# Patient Record
Sex: FEMALE | Race: WHITE | NOT HISPANIC OR LATINO | Employment: OTHER | URBAN - METROPOLITAN AREA
[De-identification: names, ages, dates, MRNs, and addresses within clinical notes are randomized per-mention and may not be internally consistent; named-entity substitution may affect disease eponyms.]

---

## 2017-01-03 ENCOUNTER — GENERIC CONVERSION - ENCOUNTER (OUTPATIENT)
Dept: OTHER | Facility: OTHER | Age: 52
End: 2017-01-03

## 2017-01-09 ENCOUNTER — TRANSCRIBE ORDERS (OUTPATIENT)
Dept: ADMINISTRATIVE | Age: 52
End: 2017-01-09

## 2017-01-09 ENCOUNTER — APPOINTMENT (OUTPATIENT)
Dept: LAB | Age: 52
End: 2017-01-09
Payer: MEDICARE

## 2017-01-09 DIAGNOSIS — R80.9 PROTEINURIA: ICD-10-CM

## 2017-01-09 DIAGNOSIS — Z94.0 HISTORY OF KIDNEY TRANSPLANT: ICD-10-CM

## 2017-01-09 DIAGNOSIS — N18.4 CHRONIC KIDNEY DISEASE, STAGE IV (SEVERE) (HCC): ICD-10-CM

## 2017-01-09 DIAGNOSIS — N25.81 SECONDARY HYPERPARATHYROIDISM OF RENAL ORIGIN (HCC): ICD-10-CM

## 2017-01-09 DIAGNOSIS — I12.9 HYPERTENSIVE CHRONIC KIDNEY DISEASE WITH STAGE 1 THROUGH STAGE 4 CHRONIC KIDNEY DISEASE, OR UNSPECIFIED CHRONIC KIDNEY DISEASE: ICD-10-CM

## 2017-01-09 LAB
ALBUMIN SERPL BCP-MCNC: 3 G/DL (ref 3.5–5)
ALP SERPL-CCNC: 106 U/L (ref 46–116)
ALT SERPL W P-5'-P-CCNC: 25 U/L (ref 12–78)
ANION GAP SERPL CALCULATED.3IONS-SCNC: 10 MMOL/L (ref 4–13)
AST SERPL W P-5'-P-CCNC: 13 U/L (ref 5–45)
BILIRUB SERPL-MCNC: 0.31 MG/DL (ref 0.2–1)
BUN SERPL-MCNC: 49 MG/DL (ref 5–25)
CALCIUM SERPL-MCNC: 9.5 MG/DL (ref 8.3–10.1)
CHLORIDE SERPL-SCNC: 108 MMOL/L (ref 100–108)
CO2 SERPL-SCNC: 20 MMOL/L (ref 21–32)
CREAT SERPL-MCNC: 1.93 MG/DL (ref 0.6–1.3)
ERYTHROCYTE [DISTWIDTH] IN BLOOD BY AUTOMATED COUNT: 15.5 % (ref 11.6–15.1)
GFR SERPL CREATININE-BSD FRML MDRD: 27.3 ML/MIN/1.73SQ M
GLUCOSE SERPL-MCNC: 163 MG/DL (ref 65–140)
HCT VFR BLD AUTO: 39.4 % (ref 34.8–46.1)
HGB BLD-MCNC: 12.9 G/DL (ref 11.5–15.4)
MAGNESIUM SERPL-MCNC: 2.2 MG/DL (ref 1.6–2.6)
MCH RBC QN AUTO: 29.9 PG (ref 26.8–34.3)
MCHC RBC AUTO-ENTMCNC: 32.7 G/DL (ref 31.4–37.4)
MCV RBC AUTO: 91 FL (ref 82–98)
PHOSPHATE SERPL-MCNC: 3.7 MG/DL (ref 2.7–4.5)
PLATELET # BLD AUTO: 204 THOUSANDS/UL (ref 149–390)
PMV BLD AUTO: 13.9 FL (ref 8.9–12.7)
POTASSIUM SERPL-SCNC: 3.6 MMOL/L (ref 3.5–5.3)
PROT SERPL-MCNC: 8.9 G/DL (ref 6.4–8.2)
RBC # BLD AUTO: 4.32 MILLION/UL (ref 3.81–5.12)
SODIUM SERPL-SCNC: 138 MMOL/L (ref 136–145)
WBC # BLD AUTO: 11.36 THOUSAND/UL (ref 4.31–10.16)

## 2017-01-09 PROCEDURE — 36415 COLL VENOUS BLD VENIPUNCTURE: CPT

## 2017-01-09 PROCEDURE — 80053 COMPREHEN METABOLIC PANEL: CPT

## 2017-01-09 PROCEDURE — 83735 ASSAY OF MAGNESIUM: CPT

## 2017-01-09 PROCEDURE — 85027 COMPLETE CBC AUTOMATED: CPT

## 2017-01-09 PROCEDURE — 84100 ASSAY OF PHOSPHORUS: CPT

## 2017-01-09 PROCEDURE — 80197 ASSAY OF TACROLIMUS: CPT

## 2017-01-11 LAB — TACROLIMUS BLD LC/MS/MS-MCNC: 5.9 NG/ML (ref 2–20)

## 2017-01-13 ENCOUNTER — GENERIC CONVERSION - ENCOUNTER (OUTPATIENT)
Dept: OTHER | Facility: OTHER | Age: 52
End: 2017-01-13

## 2017-01-13 ENCOUNTER — TRANSCRIBE ORDERS (OUTPATIENT)
Dept: LAB | Facility: HOSPITAL | Age: 52
End: 2017-01-13

## 2017-01-13 ENCOUNTER — ALLSCRIPTS OFFICE VISIT (OUTPATIENT)
Dept: OTHER | Facility: OTHER | Age: 52
End: 2017-01-13

## 2017-01-13 ENCOUNTER — APPOINTMENT (OUTPATIENT)
Dept: LAB | Facility: HOSPITAL | Age: 52
End: 2017-01-13
Payer: MEDICARE

## 2017-01-13 DIAGNOSIS — E03.9 HYPOTHYROIDISM: ICD-10-CM

## 2017-01-13 DIAGNOSIS — R53.83 OTHER FATIGUE: ICD-10-CM

## 2017-01-13 DIAGNOSIS — D64.9 ANEMIA: ICD-10-CM

## 2017-01-13 LAB
FERRITIN SERPL-MCNC: 35 NG/ML (ref 8–388)
IRON SERPL-MCNC: 45 UG/DL (ref 50–170)
TSH SERPL DL<=0.05 MIU/L-ACNC: 2.41 UIU/ML (ref 0.36–3.74)

## 2017-01-13 PROCEDURE — 82728 ASSAY OF FERRITIN: CPT

## 2017-01-13 PROCEDURE — 36415 COLL VENOUS BLD VENIPUNCTURE: CPT

## 2017-01-13 PROCEDURE — 84443 ASSAY THYROID STIM HORMONE: CPT

## 2017-01-13 PROCEDURE — 83540 ASSAY OF IRON: CPT

## 2017-02-08 ENCOUNTER — APPOINTMENT (OUTPATIENT)
Dept: LAB | Age: 52
DRG: 683 | End: 2017-02-08
Payer: MEDICARE

## 2017-02-08 ENCOUNTER — APPOINTMENT (EMERGENCY)
Dept: RADIOLOGY | Facility: HOSPITAL | Age: 52
DRG: 683 | End: 2017-02-08
Payer: MEDICARE

## 2017-02-08 ENCOUNTER — HOSPITAL ENCOUNTER (INPATIENT)
Facility: HOSPITAL | Age: 52
LOS: 3 days | Discharge: HOME | DRG: 683 | End: 2017-02-11
Attending: EMERGENCY MEDICINE | Admitting: INTERNAL MEDICINE
Payer: MEDICARE

## 2017-02-08 ENCOUNTER — APPOINTMENT (INPATIENT)
Dept: RADIOLOGY | Facility: HOSPITAL | Age: 52
DRG: 683 | End: 2017-02-08
Payer: MEDICARE

## 2017-02-08 ENCOUNTER — TRANSCRIBE ORDERS (OUTPATIENT)
Dept: ADMINISTRATIVE | Age: 52
End: 2017-02-08

## 2017-02-08 ENCOUNTER — LAB CONVERSION - ENCOUNTER (OUTPATIENT)
Dept: OTHER | Facility: OTHER | Age: 52
End: 2017-02-08

## 2017-02-08 DIAGNOSIS — Z94.0 H/O KIDNEY TRANSPLANT: ICD-10-CM

## 2017-02-08 DIAGNOSIS — I12.9 PARENCHYMAL RENAL HYPERTENSION, STAGE 1-4 OR UNSPECIFIED CHRONIC KIDNEY DISEASE: ICD-10-CM

## 2017-02-08 DIAGNOSIS — N39.0 UTI (URINARY TRACT INFECTION): Primary | ICD-10-CM

## 2017-02-08 DIAGNOSIS — I12.9 PARENCHYMAL RENAL HYPERTENSION, STAGE 1-4 OR UNSPECIFIED CHRONIC KIDNEY DISEASE: Primary | ICD-10-CM

## 2017-02-08 DIAGNOSIS — N18.4 CHRONIC KIDNEY DISEASE, STAGE IV (SEVERE) (HCC): ICD-10-CM

## 2017-02-08 DIAGNOSIS — E21.1 HYPERPARATHYROIDISM DUE TO 1,25(0H)2D3 (HCC): ICD-10-CM

## 2017-02-08 DIAGNOSIS — Z94.83 H/O PANCREAS TRANSPLANT (HCC): ICD-10-CM

## 2017-02-08 DIAGNOSIS — Z94.0 KIDNEY REPLACED BY TRANSPLANT: ICD-10-CM

## 2017-02-08 DIAGNOSIS — Z94.0 HISTORY OF KIDNEY TRANSPLANT: ICD-10-CM

## 2017-02-08 DIAGNOSIS — N17.9 ARF (ACUTE RENAL FAILURE) (HCC): ICD-10-CM

## 2017-02-08 DIAGNOSIS — R80.9 PROTEINURIA: ICD-10-CM

## 2017-02-08 DIAGNOSIS — I12.9 HYPERTENSIVE CHRONIC KIDNEY DISEASE WITH STAGE 1 THROUGH STAGE 4 CHRONIC KIDNEY DISEASE, OR UNSPECIFIED CHRONIC KIDNEY DISEASE: ICD-10-CM

## 2017-02-08 DIAGNOSIS — N25.81 SECONDARY HYPERPARATHYROIDISM OF RENAL ORIGIN (HCC): ICD-10-CM

## 2017-02-08 LAB
ALBUMIN SERPL BCP-MCNC: 3 G/DL (ref 3.5–5)
ALP SERPL-CCNC: 141 U/L (ref 46–116)
ALT SERPL W P-5'-P-CCNC: 31 U/L (ref 12–78)
ANION GAP SERPL CALCULATED.3IONS-SCNC: 12 MMOL/L (ref 4–13)
ANION GAP SERPL CALCULATED.3IONS-SCNC: 12 MMOL/L (ref 4–13)
AST SERPL W P-5'-P-CCNC: 20 U/L (ref 5–45)
ATRIAL RATE: 69 BPM
BACTERIA UR QL AUTO: ABNORMAL /HPF
BASOPHILS # BLD AUTO: 0.03 THOUSANDS/ΜL (ref 0–0.1)
BASOPHILS NFR BLD AUTO: 0 % (ref 0–1)
BILIRUB SERPL-MCNC: 0.37 MG/DL (ref 0.2–1)
BILIRUB UR QL STRIP: NEGATIVE
BUN SERPL-MCNC: 64 MG/DL (ref 5–25)
BUN SERPL-MCNC: 65 MG/DL (ref 5–25)
CALCIUM SERPL-MCNC: 9.4 MG/DL (ref 8.3–10.1)
CALCIUM SERPL-MCNC: 9.5 MG/DL (ref 8.3–10.1)
CHLORIDE SERPL-SCNC: 103 MMOL/L (ref 100–108)
CHLORIDE SERPL-SCNC: 103 MMOL/L (ref 100–108)
CLARITY UR: CLEAR
CO2 SERPL-SCNC: 13 MMOL/L (ref 21–32)
CO2 SERPL-SCNC: 14 MMOL/L (ref 21–32)
COLOR UR: YELLOW
COLOR, POC: YELLOW
CREAT SERPL-MCNC: 2.67 MG/DL (ref 0.6–1.3)
CREAT SERPL-MCNC: 2.7 MG/DL (ref 0.6–1.3)
EOSINOPHIL # BLD AUTO: 0.35 THOUSAND/ΜL (ref 0–0.61)
EOSINOPHIL NFR BLD AUTO: 3 % (ref 0–6)
ERYTHROCYTE [DISTWIDTH] IN BLOOD BY AUTOMATED COUNT: 15.9 % (ref 11.6–15.1)
ERYTHROCYTE [DISTWIDTH] IN BLOOD BY AUTOMATED COUNT: 16 % (ref 11.6–15.1)
GFR SERPL CREATININE-BSD FRML MDRD: 18.5 ML/MIN/1.73SQ M
GFR SERPL CREATININE-BSD FRML MDRD: 18.7 ML/MIN/1.73SQ M
GLUCOSE SERPL-MCNC: 125 MG/DL (ref 65–140)
GLUCOSE SERPL-MCNC: 143 MG/DL (ref 65–140)
GLUCOSE UR STRIP-MCNC: NEGATIVE MG/DL
HCT VFR BLD AUTO: 40.3 % (ref 34.8–46.1)
HCT VFR BLD AUTO: 41.6 % (ref 34.8–46.1)
HGB BLD-MCNC: 13.8 G/DL (ref 11.5–15.4)
HGB BLD-MCNC: 13.9 G/DL (ref 11.5–15.4)
HGB UR QL STRIP.AUTO: NEGATIVE
KETONES UR STRIP-MCNC: NEGATIVE MG/DL
LEUKOCYTE ESTERASE UR QL STRIP: ABNORMAL
LYMPHOCYTES # BLD AUTO: 1.8 THOUSANDS/ΜL (ref 0.6–4.47)
LYMPHOCYTES NFR BLD AUTO: 17 % (ref 14–44)
MAGNESIUM SERPL-MCNC: 2.5 MG/DL (ref 1.6–2.6)
MCH RBC QN AUTO: 29.9 PG (ref 26.8–34.3)
MCH RBC QN AUTO: 30.5 PG (ref 26.8–34.3)
MCHC RBC AUTO-ENTMCNC: 33.4 G/DL (ref 31.4–37.4)
MCHC RBC AUTO-ENTMCNC: 34.2 G/DL (ref 31.4–37.4)
MCV RBC AUTO: 89 FL (ref 82–98)
MCV RBC AUTO: 90 FL (ref 82–98)
MONOCYTES # BLD AUTO: 0.7 THOUSAND/ΜL (ref 0.17–1.22)
MONOCYTES NFR BLD AUTO: 7 % (ref 4–12)
MUCOUS THREADS UR QL AUTO: ABNORMAL
NEUTROPHILS # BLD AUTO: 7.72 THOUSANDS/ΜL (ref 1.85–7.62)
NEUTS SEG NFR BLD AUTO: 73 % (ref 43–75)
NITRITE UR QL STRIP: POSITIVE
NON-SQ EPI CELLS URNS QL MICRO: ABNORMAL /HPF
NRBC BLD AUTO-RTO: 0 /100 WBCS
OTHER STN SPEC: ABNORMAL
P AXIS: 48 DEGREES
PH UR STRIP.AUTO: 7 [PH] (ref 4.5–8)
PHOSPHATE SERPL-MCNC: 3.7 MG/DL (ref 2.7–4.5)
PLATELET # BLD AUTO: 218 THOUSANDS/UL (ref 149–390)
PLATELET # BLD AUTO: 223 THOUSANDS/UL (ref 149–390)
PLATELET # BLD AUTO: 236 THOUSANDS/UL (ref 149–390)
PMV BLD AUTO: 12.7 FL (ref 8.9–12.7)
PMV BLD AUTO: 12.8 FL (ref 8.9–12.7)
PMV BLD AUTO: 12.9 FL (ref 8.9–12.7)
POTASSIUM SERPL-SCNC: 4.4 MMOL/L (ref 3.5–5.3)
POTASSIUM SERPL-SCNC: 4.5 MMOL/L (ref 3.5–5.3)
PR INTERVAL: 172 MS
PROT SERPL-MCNC: 9 G/DL (ref 6.4–8.2)
PROT UR STRIP-MCNC: ABNORMAL MG/DL
QRS AXIS: -50 DEGREES
QRSD INTERVAL: 96 MS
QT INTERVAL: 404 MS
QTC INTERVAL: 432 MS
RBC # BLD AUTO: 4.53 MILLION/UL (ref 3.81–5.12)
RBC # BLD AUTO: 4.65 MILLION/UL (ref 3.81–5.12)
RBC #/AREA URNS AUTO: ABNORMAL /HPF
SODIUM SERPL-SCNC: 128 MMOL/L (ref 136–145)
SODIUM SERPL-SCNC: 129 MMOL/L (ref 136–145)
SP GR UR STRIP.AUTO: 1.01 (ref 1–1.03)
T WAVE AXIS: 9 DEGREES
TSH SERPL DL<=0.05 MIU/L-ACNC: 3.75 UIU/ML (ref 0.36–3.74)
UROBILINOGEN UR QL STRIP.AUTO: 0.2 E.U./DL
VENTRICULAR RATE: 69 BPM
WBC # BLD AUTO: 10.05 THOUSAND/UL (ref 4.31–10.16)
WBC # BLD AUTO: 10.67 THOUSAND/UL (ref 4.31–10.16)
WBC #/AREA URNS AUTO: ABNORMAL /HPF

## 2017-02-08 PROCEDURE — 87186 SC STD MICRODIL/AGAR DIL: CPT

## 2017-02-08 PROCEDURE — 84443 ASSAY THYROID STIM HORMONE: CPT | Performed by: EMERGENCY MEDICINE

## 2017-02-08 PROCEDURE — 76770 US EXAM ABDO BACK WALL COMP: CPT

## 2017-02-08 PROCEDURE — 36415 COLL VENOUS BLD VENIPUNCTURE: CPT

## 2017-02-08 PROCEDURE — 85025 COMPLETE CBC W/AUTO DIFF WBC: CPT | Performed by: EMERGENCY MEDICINE

## 2017-02-08 PROCEDURE — 81001 URINALYSIS AUTO W/SCOPE: CPT

## 2017-02-08 PROCEDURE — 87077 CULTURE AEROBIC IDENTIFY: CPT

## 2017-02-08 PROCEDURE — 80197 ASSAY OF TACROLIMUS: CPT

## 2017-02-08 PROCEDURE — 71020 HB CHEST X-RAY 2VW FRONTAL&LATL: CPT

## 2017-02-08 PROCEDURE — 83735 ASSAY OF MAGNESIUM: CPT

## 2017-02-08 PROCEDURE — 80053 COMPREHEN METABOLIC PANEL: CPT

## 2017-02-08 PROCEDURE — 84100 ASSAY OF PHOSPHORUS: CPT

## 2017-02-08 PROCEDURE — 96374 THER/PROPH/DIAG INJ IV PUSH: CPT

## 2017-02-08 PROCEDURE — 81002 URINALYSIS NONAUTO W/O SCOPE: CPT | Performed by: EMERGENCY MEDICINE

## 2017-02-08 PROCEDURE — 93976 VASCULAR STUDY: CPT

## 2017-02-08 PROCEDURE — 80048 BASIC METABOLIC PNL TOTAL CA: CPT | Performed by: EMERGENCY MEDICINE

## 2017-02-08 PROCEDURE — 85049 AUTOMATED PLATELET COUNT: CPT | Performed by: PHYSICIAN ASSISTANT

## 2017-02-08 PROCEDURE — 85027 COMPLETE CBC AUTOMATED: CPT

## 2017-02-08 PROCEDURE — 96361 HYDRATE IV INFUSION ADD-ON: CPT

## 2017-02-08 PROCEDURE — 99285 EMERGENCY DEPT VISIT HI MDM: CPT

## 2017-02-08 PROCEDURE — 87040 BLOOD CULTURE FOR BACTERIA: CPT | Performed by: PHYSICIAN ASSISTANT

## 2017-02-08 PROCEDURE — 87086 URINE CULTURE/COLONY COUNT: CPT

## 2017-02-08 PROCEDURE — 93005 ELECTROCARDIOGRAM TRACING: CPT | Performed by: EMERGENCY MEDICINE

## 2017-02-08 RX ORDER — HYDRALAZINE HYDROCHLORIDE 50 MG/1
50 TABLET, FILM COATED ORAL 3 TIMES DAILY
Status: DISCONTINUED | OUTPATIENT
Start: 2017-02-08 | End: 2017-02-11 | Stop reason: HOSPADM

## 2017-02-08 RX ORDER — LEVOTHYROXINE SODIUM 88 UG/1
88 TABLET ORAL
Status: DISCONTINUED | OUTPATIENT
Start: 2017-02-09 | End: 2017-02-11 | Stop reason: HOSPADM

## 2017-02-08 RX ORDER — ONDANSETRON 2 MG/ML
4 INJECTION INTRAMUSCULAR; INTRAVENOUS EVERY 6 HOURS PRN
Status: DISCONTINUED | OUTPATIENT
Start: 2017-02-08 | End: 2017-02-11 | Stop reason: HOSPADM

## 2017-02-08 RX ORDER — AMLODIPINE BESYLATE 5 MG/1
5 TABLET ORAL 2 TIMES DAILY
Status: DISCONTINUED | OUTPATIENT
Start: 2017-02-08 | End: 2017-02-11 | Stop reason: HOSPADM

## 2017-02-08 RX ORDER — LACTULOSE 20 G/30ML
30 SOLUTION ORAL DAILY
Status: DISCONTINUED | OUTPATIENT
Start: 2017-02-09 | End: 2017-02-11 | Stop reason: HOSPADM

## 2017-02-08 RX ORDER — METOPROLOL TARTRATE 50 MG/1
50 TABLET, FILM COATED ORAL 2 TIMES DAILY
Status: DISCONTINUED | OUTPATIENT
Start: 2017-02-08 | End: 2017-02-11 | Stop reason: HOSPADM

## 2017-02-08 RX ORDER — PANTOPRAZOLE SODIUM 40 MG/1
40 TABLET, DELAYED RELEASE ORAL
Status: DISCONTINUED | OUTPATIENT
Start: 2017-02-09 | End: 2017-02-11 | Stop reason: HOSPADM

## 2017-02-08 RX ORDER — DULOXETIN HYDROCHLORIDE 60 MG/1
60 CAPSULE, DELAYED RELEASE ORAL 2 TIMES DAILY
Status: DISCONTINUED | OUTPATIENT
Start: 2017-02-08 | End: 2017-02-11 | Stop reason: HOSPADM

## 2017-02-08 RX ORDER — TACROLIMUS 1 MG/1
3 CAPSULE ORAL
Status: DISCONTINUED | OUTPATIENT
Start: 2017-02-08 | End: 2017-02-11 | Stop reason: HOSPADM

## 2017-02-08 RX ORDER — SODIUM CHLORIDE 9 MG/ML
75 INJECTION, SOLUTION INTRAVENOUS CONTINUOUS
Status: DISCONTINUED | OUTPATIENT
Start: 2017-02-08 | End: 2017-02-09

## 2017-02-08 RX ORDER — SODIUM BICARBONATE 650 MG/1
650 TABLET ORAL DAILY
Status: DISCONTINUED | OUTPATIENT
Start: 2017-02-09 | End: 2017-02-10

## 2017-02-08 RX ORDER — CLONIDINE HYDROCHLORIDE 0.1 MG/1
0.2 TABLET ORAL
Status: DISCONTINUED | OUTPATIENT
Start: 2017-02-09 | End: 2017-02-11 | Stop reason: HOSPADM

## 2017-02-08 RX ORDER — MELATONIN
3000 DAILY
Status: DISCONTINUED | OUTPATIENT
Start: 2017-02-09 | End: 2017-02-11 | Stop reason: HOSPADM

## 2017-02-08 RX ORDER — HEPARIN SODIUM 5000 [USP'U]/ML
5000 INJECTION, SOLUTION INTRAVENOUS; SUBCUTANEOUS EVERY 8 HOURS SCHEDULED
Status: DISCONTINUED | OUTPATIENT
Start: 2017-02-08 | End: 2017-02-11 | Stop reason: HOSPADM

## 2017-02-08 RX ORDER — CEFTRIAXONE 2 G/1
2000 INJECTION, POWDER, FOR SOLUTION INTRAMUSCULAR; INTRAVENOUS ONCE
Status: DISCONTINUED | OUTPATIENT
Start: 2017-02-08 | End: 2017-02-08

## 2017-02-08 RX ORDER — TACROLIMUS 1 MG/1
4 CAPSULE ORAL DAILY
Status: DISCONTINUED | OUTPATIENT
Start: 2017-02-09 | End: 2017-02-11 | Stop reason: HOSPADM

## 2017-02-08 RX ORDER — FOLIC ACID 1 MG/1
1 TABLET ORAL DAILY
Status: DISCONTINUED | OUTPATIENT
Start: 2017-02-09 | End: 2017-02-11 | Stop reason: HOSPADM

## 2017-02-08 RX ORDER — ARIPIPRAZOLE 15 MG/1
30 TABLET ORAL
Status: DISCONTINUED | OUTPATIENT
Start: 2017-02-08 | End: 2017-02-11 | Stop reason: HOSPADM

## 2017-02-08 RX ORDER — ASPIRIN 81 MG/1
81 TABLET, CHEWABLE ORAL DAILY
Status: DISCONTINUED | OUTPATIENT
Start: 2017-02-09 | End: 2017-02-11 | Stop reason: HOSPADM

## 2017-02-08 RX ORDER — DOXAZOSIN MESYLATE 1 MG/1
1 TABLET ORAL
Status: DISCONTINUED | OUTPATIENT
Start: 2017-02-08 | End: 2017-02-11 | Stop reason: HOSPADM

## 2017-02-08 RX ORDER — DOXAZOSIN MESYLATE 1 MG/1
1 TABLET ORAL
COMMUNITY
End: 2017-11-02 | Stop reason: HOSPADM

## 2017-02-08 RX ORDER — LORAZEPAM 1 MG/1
2 TABLET ORAL 2 TIMES DAILY PRN
Status: DISCONTINUED | OUTPATIENT
Start: 2017-02-08 | End: 2017-02-11 | Stop reason: HOSPADM

## 2017-02-08 RX ORDER — ACETAMINOPHEN 325 MG/1
650 TABLET ORAL EVERY 6 HOURS PRN
Status: DISCONTINUED | OUTPATIENT
Start: 2017-02-08 | End: 2017-02-11 | Stop reason: HOSPADM

## 2017-02-08 RX ORDER — ROPINIROLE 0.25 MG/1
0.25 TABLET, FILM COATED ORAL 2 TIMES DAILY
Status: DISCONTINUED | OUTPATIENT
Start: 2017-02-08 | End: 2017-02-11 | Stop reason: HOSPADM

## 2017-02-08 RX ORDER — SERTRALINE HYDROCHLORIDE 100 MG/1
200 TABLET, FILM COATED ORAL DAILY
Status: DISCONTINUED | OUTPATIENT
Start: 2017-02-09 | End: 2017-02-11 | Stop reason: HOSPADM

## 2017-02-08 RX ORDER — CLONIDINE HYDROCHLORIDE 0.1 MG/1
0.3 TABLET ORAL 2 TIMES DAILY
Status: DISCONTINUED | OUTPATIENT
Start: 2017-02-08 | End: 2017-02-11 | Stop reason: HOSPADM

## 2017-02-08 RX ORDER — ONDANSETRON 2 MG/ML
4 INJECTION INTRAMUSCULAR; INTRAVENOUS ONCE
Status: COMPLETED | OUTPATIENT
Start: 2017-02-08 | End: 2017-02-08

## 2017-02-08 RX ORDER — PRAVASTATIN SODIUM 80 MG/1
80 TABLET ORAL
Status: DISCONTINUED | OUTPATIENT
Start: 2017-02-08 | End: 2017-02-11 | Stop reason: HOSPADM

## 2017-02-08 RX ORDER — PREDNISONE 2.5 MG
2.5 TABLET ORAL DAILY
Status: DISCONTINUED | OUTPATIENT
Start: 2017-02-09 | End: 2017-02-11 | Stop reason: HOSPADM

## 2017-02-08 RX ADMIN — ROPINIROLE 0.25 MG: 0.25 TABLET, FILM COATED ORAL at 19:50

## 2017-02-08 RX ADMIN — CLONIDINE HYDROCHLORIDE 0.3 MG: 0.1 TABLET ORAL at 18:51

## 2017-02-08 RX ADMIN — HEPARIN SODIUM 5000 UNITS: 5000 INJECTION, SOLUTION INTRAVENOUS; SUBCUTANEOUS at 21:13

## 2017-02-08 RX ADMIN — SODIUM CHLORIDE 1000 ML: 0.9 INJECTION, SOLUTION INTRAVENOUS at 08:32

## 2017-02-08 RX ADMIN — CEFTRIAXONE 2000 MG: 2 INJECTION, POWDER, FOR SOLUTION INTRAMUSCULAR; INTRAVENOUS at 12:11

## 2017-02-08 RX ADMIN — ARIPIPRAZOLE 30 MG: 15 TABLET ORAL at 21:13

## 2017-02-08 RX ADMIN — SODIUM BICARBONATE: 84 INJECTION, SOLUTION INTRAVENOUS at 15:58

## 2017-02-08 RX ADMIN — METOPROLOL TARTRATE 50 MG: 50 TABLET ORAL at 18:50

## 2017-02-08 RX ADMIN — ONDANSETRON 4 MG: 2 INJECTION INTRAMUSCULAR; INTRAVENOUS at 09:45

## 2017-02-08 RX ADMIN — PRAVASTATIN SODIUM 80 MG: 80 TABLET ORAL at 18:51

## 2017-02-08 RX ADMIN — AMLODIPINE BESYLATE 5 MG: 5 TABLET ORAL at 18:51

## 2017-02-08 RX ADMIN — SODIUM CHLORIDE 75 ML/HR: 0.9 INJECTION, SOLUTION INTRAVENOUS at 18:13

## 2017-02-08 RX ADMIN — HYDRALAZINE HYDROCHLORIDE 50 MG: 50 TABLET ORAL at 21:13

## 2017-02-08 RX ADMIN — TRAZODONE HYDROCHLORIDE 150 MG: 100 TABLET ORAL at 21:15

## 2017-02-08 RX ADMIN — DULOXETINE HYDROCHLORIDE 60 MG: 60 CAPSULE, DELAYED RELEASE ORAL at 18:51

## 2017-02-08 RX ADMIN — TACROLIMUS 3 MG: 1 CAPSULE ORAL at 18:51

## 2017-02-08 RX ADMIN — DOXAZOSIN MESYLATE 1 MG: 1 TABLET ORAL at 21:13

## 2017-02-08 NOTE — H&P
History and Physical - [facility] Internal Medicine    Patient Information: Melisa Gitelman 45 y o  female MRN: [id number]  Unit/Bed#: ED 12 Encounter: [id number]  Admitting Physician: Joaquín Guthrie PA-C  PCP: Joanie Hull MD  Date of Admission:  02/08/17    Assessment/Plan:    Hospital Problem List:     Principal Problem:    UTI (urinary tract infection)  Active Problems:    ARF (acute renal failure)    Essential hypertension    Status post simultaneous kidney and pancreas transplant      Plan for the Primary Problem(s):  · Suspected UTI  · Continue IV ceftriaxone  · Follow-up final urine culture  · Check blood cultures    Plan for Additional Problems:   · EDUARDO/CKD3 with kidney pancreas transplant - baseline appears around 1 9  Hold Lasix  IV fluids  Nephrology consult  · Hypertension-monitor  · Anxiety/Depression - continue home meds  · Chronic constipation - lactulose  · Patient did not bring med list from home, her med list was confirmed/adjusted based on [employer]  VTE Prophylaxis: Heparin  / sequential compression device   Code Status: Full code  POLST: POLST is not applicable to this patient    Anticipated Length of Stay:  Patient will be admitted on an Observation basis with an anticipated length of stay of  Greater than 2 midnights  Justification for Hospital Stay: IV antibioitcs and culture f/u for UTI  IVF for EDUARDO    Total Time for Visit, including Counseling / Coordination of Care: 1 hour  Greater than 50% of this total time spent on direct patient counseling and coordination of care  Chief Complaint:   Generalized fatigue and weakness    History of Present Illness: Melisa Gitelman is a 45 y o  female who presents with one week history of increasing generalized fatigue and weakness  Patient states she's had some difficulty urinating, dysuria, and suprapubic pain  Admits to shaking chills at times   Patient states she has had urinary tract infections in the past     Review of Systems:    Review of Systems   Constitutional: Positive for chills and fatigue  Negative for activity change, appetite change, diaphoresis and fever  HENT: Negative for rhinorrhea, sinus pressure, sneezing and sore throat  Eyes: Negative for photophobia, redness and visual disturbance  Respiratory: Negative for cough, chest tightness, shortness of breath and wheezing  Cardiovascular: Negative for chest pain, palpitations and leg swelling  Gastrointestinal: Negative for abdominal distention, abdominal pain, anal bleeding, blood in stool, constipation, diarrhea, nausea and vomiting  Genitourinary: Positive for difficulty urinating, dysuria and frequency  Negative for flank pain, hematuria and urgency  Musculoskeletal: Negative for back pain and gait problem  Skin: Negative for rash and wound  Allergic/Immunologic: Negative for immunocompromised state  Neurological: Negative for dizziness, tremors, seizures, syncope, facial asymmetry, speech difficulty, weakness, light-headedness, numbness and headaches  Hematological: Negative for adenopathy  Psychiatric/Behavioral: Negative for agitation, confusion and hallucinations  The patient is not nervous/anxious          Past Medical and Surgical History:     Past Medical History   Diagnosis Date    Anemia     Diabetes mellitus      Previous, controlled with diet    Disease of thyroid gland     History of transfusion     Hypertension     Night blindness     Psychiatric disorder     Renal disorder     Retinopathy     Status post simultaneous kidney and pancreas transplant     Toe amputation status        Past Surgical History   Procedure Laterality Date    Nephrectomy transplanted organ      Hallux valgus correction Right     Combined kidney-pancreas transplant N/A     Foot amputation through metatarsal Left     Eye surgery       cataracts    Cystoscopy w/ retrogrades N/A 10/13/2016     Procedure: CYSTOSCOPY, retrograde pyelogram, biopsy of ureteral polyp; Surgeon: Lanie Arvizu MD;  Location: [facility];  Service:        Meds/Allergies:    Prior to Admission medications    Medication Sig Start Date End Date Taking? Authorizing Provider   acetaminophen (TYLENOL) 325 mg tablet Take 2 tablets (650 mg total) by mouth every 6 (six) hours as needed for mild pain or fever  5/3/16   Silverio Carty,    amLODIPine (NORVASC) 5 mg tablet Take 5 mg by mouth 2 (two) times a day  Historical Provider, MD   ARIPiprazole (ABILIFY) 20 MG tablet Take 20 mg by mouth daily  Historical Provider, MD   aspirin 81 MG tablet Take 81 mg by mouth daily  Historical Provider, MD   cephalexin (KEFLEX) 500 mg capsule Take 500 mg by mouth 4 (four) times a day    Historical Provider, MD   Cholecalciferol (VITAMIN D3) 3000 UNITS TABS Take 1 tablet by mouth daily  Historical Provider, MD   cloNIDine (CATAPRES) 0 3 mg tablet Take 0 3 mg by mouth 2 (two) times a day Indications: 0 3 mg in am, 0 2 mg at noon, and 0 3 mg in pm       Historical Provider, MD   DULoxetine (CYMBALTA) 60 mg delayed release capsule Take 60 mg by mouth 2 (two) times a day  Historical Provider, MD   folic acid (FOLVITE) 1 mg tablet Take 1 mg by mouth 2 (two) times a day  Historical Provider, MD   furosemide (LASIX) 40 mg tablet Take 40 mg by mouth 2 (two) times a day as needed      Historical Provider, MD   hydrALAZINE (APRESOLINE) 50 mg tablet Take 50 mg by mouth 3 (three) times a day  Historical Provider, MD   HYDROcodone-acetaminophen (NORCO) 5-325 mg per tablet Take 1 tablet by mouth every 6 (six) hours as needed for pain    Historical Provider, MD   Lactulose Encephalopathy (GENERLAC PO) Take 30 mL by mouth daily      Historical Provider, MD   Levothyroxine Sodium 88 MCG CAPS Take by mouth    Historical Provider, MD   LORazepam (ATIVAN) 2 mg tablet Take 2 mg by mouth 2 (two) times a day as needed for anxiety      Historical Provider, MD   metoprolol tartrate (LOPRESSOR) 50 mg tablet Take 50 mg by mouth 2 (two) times a day  Historical Provider, MD   pantoprazole (PROTONIX) 40 mg tablet Take 40 mg by mouth daily  Historical Provider, MD   pravastatin (PRAVACHOL) 80 mg tablet Take 80 mg by mouth daily  Historical Provider, MD   predniSONE 5 mg tablet Take 7 5 mg by mouth daily  Historical Provider, MD   rOPINIRole (REQUIP) 0 25 mg tablet Take 0 25 mg by mouth 2 (two) times a day  Historical Provider, MD   sertraline (ZOLOFT) 100 mg tablet Take 100 mg by mouth 2 (two) times a day  Historical Provider, MD   sodium bicarbonate 650 mg tablet Take 650 mg by mouth daily    Historical Provider, MD   sucralfate (CARAFATE) 1 g/10 mL suspension Take 1 g by mouth 2 (two) times a day  Historical Provider, MD   tacrolimus (PROGRAF) 1 mg capsule Take 1 mg by mouth 2 (two) times a day  Historical Provider, MD   traZODone (DESYREL) 150 mg tablet Take 150 mg by mouth daily at bedtime  Historical Provider, MD     I have reviewed home medications using allscripts  Allergies:    Allergies   Allergen Reactions    Morphine And Related GI Intolerance    Myrbetriq [Mirabegron]     Penicillins Hives     Tolerates cefazolin       Social History:     Marital Status: /Civil Union   Substance Use History:   History   Alcohol Use No     History   Smoking Status    Former Smoker    Quit date: 4/28/2012   Smokeless Tobacco    Former User     History   Drug Use    Yes    Special: Hydrocodone       Family History:    Family History   Problem Relation Age of Onset    Hypertension Mother     Hypertension Father     Cancer Maternal Grandfather     Cancer Paternal Grandmother     Cancer Paternal Grandfather        Physical Exam:     Vitals:   Blood Pressure: 152/67 (02/08/17 1100)  Pulse: 72 (02/08/17 1211)  Temperature: (!) 97 3 °F (36 3 °C) (02/08/17 0722)  Temp Source: Tympanic (02/08/17 0722)  Respirations: 18 (02/08/17 1211)  Weight - Scale: 94 8 kg (209 lb) (02/08/17 0722)  SpO2: 98 % (02/08/17 1211)    Physical Exam   Constitutional: She is oriented to person, place, and time  She appears well-developed and well-nourished  No distress  HENT:   Head: Normocephalic and atraumatic  Cardiovascular: Normal rate and regular rhythm  Exam reveals no friction rub  No murmur heard  Pulmonary/Chest: Effort normal and breath sounds normal  No respiratory distress  She has no wheezes  Abdominal: Soft  Bowel sounds are normal  She exhibits no distension  There is tenderness (suprapubic tenderness to palpation)  There is no rebound and no guarding  Musculoskeletal: She exhibits no edema  Neurological: She is alert and oriented to person, place, and time  No cranial nerve deficit  Skin: Skin is warm and dry  No rash noted  Psychiatric: She has a normal mood and affect  Nursing note and vitals reviewed  Additional Data:     Lab Results: I have personally reviewed pertinent reports  Results from last 7 days  Lab Units 02/08/17  0829   WBC Thousand/uL 10 67*   HEMOGLOBIN g/dL 13 8   HEMATOCRIT % 40 3   PLATELETS Thousands/uL 223   NEUTROS PCT % 73   LYMPHS PCT % 17   MONOS PCT % 7   EOS PCT % 3       Results from last 7 days  Lab Units 02/08/17  0829 02/08/17  0644   SODIUM mmol/L 128* 129*   POTASSIUM mmol/L 4 5 4 4   CHLORIDE mmol/L 103 103   CO2 mmol/L 13* 14*   BUN mg/dL 65* 64*   CREATININE mg/dL 2 67* 2 70*   CALCIUM mg/dL 9 5 9 4   TOTAL PROTEIN g/dL  --  9 0*   BILIRUBIN TOTAL mg/dL  --  0 37   ALK PHOS U/L  --  141*   ALT U/L  --  31   AST U/L  --  20   GLUCOSE RANDOM mg/dL 125 143*           Imaging: I have personally reviewed pertinent reports  Xr Chest 2 Views    Result Date: 2/8/2017  Narrative: CHEST INDICATION:  Weakness  COMPARISON:  September 13, 2016  VIEWS:  Frontal and lateral projections; 2 images FINDINGS:     Cardiomediastinal silhouette appears unremarkable  The lungs are clear  No pneumothorax or pleural effusion  Visualized osseous structures appear within normal limits for the patient's age  Impression: Normal examination  Workstation performed: [id number]       EKG, Pathology, and Other Studies Reviewed on Admission:       Allscripts Records Reviewed: Yes     ** Please Note: Dragon 360 Dictation voice to text software may have been used in the creation of this document   **

## 2017-02-08 NOTE — IP AVS SNAPSHOT
Ayana [address] [place], [place] [address]jono Ortiz, [address]  Indy Doshi  [phone]           AFTER VISIT Reza Adams   45 yrs Female (1965)    MRN:  [id number]            Medications    It is important that you maintain an up-to-date list of medications (prescribed and over-the-counter, as well as vitamins and mineral supplements) that you are taking  Bring your list of current medications whenever you seek treatment at a hospital or other healthcare setting  If you have any questions or concerns, contact your primary care physician's office  Your Medications      TAKE these medications     acetaminophen 325 mg tablet   Take 2 tablets (650 mg total) by mouth every 6 (six) hours as needed for mild pain or fever  Refills:  0   Commonly known as:  TYLENOL           amLODIPine 5 mg tablet   Take 5 mg by mouth 2 (two) times a day  Last time this was given:  5 mg on 2/11/2017  8:51 AM   Refills:  0   Commonly known as:  NORVASC           ARIPiprazole 20 MG tablet   Take 20 mg by mouth daily  Last time this was given:  30 mg on 2/10/2017 10:10 PM   Refills:  0   Commonly known as:  ABILIFY           aspirin 81 MG tablet   Take 81 mg by mouth daily  Refills:  0           cephalexin 500 mg capsule   Take 1 capsule by mouth 4 (four) times a day for 7 days   Refills:  0   Commonly known as:  KEFLEX           cloNIDine 0 3 mg tablet   Take 0 3 mg by mouth 2 (two) times a day Indications: 0 3 mg in am, 0 2 mg at noon, and 0 3 mg in pm    Last time this was given:  0 2 mg on 2/11/2017 12:06 PM   Refills:  0   Commonly known as:  CATAPRES           doxazosin 1 mg tablet   Take 1 mg by mouth daily at bedtime   Last time this was given:  1 mg on 2/10/2017 10:10 PM   Refills:  0   Commonly known as:  CARDURA           DULoxetine 60 mg delayed release capsule   Take 60 mg by mouth 2 (two) times a day     Last time this was given:  60 mg on 2/11/2017  8:49 AM Refills:  0   Commonly known as:  CYMBALTA           folic acid 1 mg tablet   Take 1 mg by mouth 2 (two) times a day  Last time this was given:  1 mg on 2/11/2017  8:49 AM   Refills:  0   Commonly known as:  FOLVITE           furosemide 40 mg tablet   Take 40 mg by mouth 2 (two) times a day as needed   Refills:  0   Commonly known as:  LASIX           GENERLAC PO   Take 30 mL by mouth daily  Refills:  0           hydrALAZINE 50 mg tablet   Take 50 mg by mouth 3 (three) times a day  Last time this was given:  50 mg on 2/11/2017  8:50 AM   Refills:  0   Commonly known as:  APRESOLINE           Levothyroxine Sodium 88 MCG Caps   Take by mouth   Refills:  0           LORazepam 2 mg tablet   Take 2 mg by mouth 2 (two) times a day as needed for anxiety   Refills:  0   Commonly known as:  ATIVAN           metoprolol tartrate 50 mg tablet   Take 50 mg by mouth 2 (two) times a day  Last time this was given:  50 mg on 2/11/2017  8:51 AM   Refills:  0   Commonly known as:  LOPRESSOR           pantoprazole 40 mg tablet   Take 40 mg by mouth daily  Last time this was given:  40 mg on 2/11/2017  6:21 AM   Refills:  0   Commonly known as:  PROTONIX           pravastatin 80 mg tablet   Take 80 mg by mouth daily  Last time this was given:  80 mg on 2/10/2017  4:23 PM   Refills:  0   Commonly known as:  PRAVACHOL           predniSONE 5 mg tablet   Take 7 5 mg by mouth daily  Last time this was given:  2 5 mg on 2/11/2017  8:52 AM   Refills:  0           rOPINIRole 0 25 mg tablet   Take 0 25 mg by mouth 2 (two) times a day  Last time this was given:  0 25 mg on 2/11/2017  8:52 AM   Refills:  0   Commonly known as:  REQUIP           sertraline 100 mg tablet   Take 100 mg by mouth 2 (two) times a day     Last time this was given:  200 mg on 2/11/2017  8:49 AM   Refills:  0   Commonly known as:  ZOLOFT           sodium bicarbonate 650 mg tablet   Take 650 mg by mouth daily   Last time this was given:  650 mg on 2/11/2017 8:50 AM   Refills:  0           tacrolimus 1 mg capsule   Take 1 mg by mouth 2 (two) times a day  Last time this was given:  4 mg on 2/11/2017  8:50 AM   Refills:  0   Commonly known as:  PROGRAF           traZODone 150 mg tablet   Take 150 mg by mouth daily at bedtime  Last time this was given:  150 mg on 2/9/2017  9:41 PM   Refills:  0   Commonly known as:  DESYREL           Vitamin D3 3000 UNITS Tabs   Take 1 tablet by mouth daily  Last time this was given:  3,000 Units on 2/11/2017  8:51 AM   Refills:  0             ASK your doctor about these medications     HYDROcodone-acetaminophen 5-325 mg per tablet   Take 1 tablet by mouth every 6 (six) hours as needed for pain   Refills:  0   Commonly known as:  NORCO           sucralfate 1 g/10 mL suspension   Take 1 g by mouth 2 (two) times a day  Refills:  0   Commonly known as:  CARAFATE                Where to Get Your Medications      You can get these medications from any pharmacy     Bring a paper prescription for each of these medications     cephalexin 500 mg capsule                  Your Medications      Your Medication List           Morning Noon Evening Bedtime As Needed    acetaminophen 325 mg tablet   Take 2 tablets (650 mg total) by mouth every 6 (six) hours as needed for mild pain or fever  Commonly known as:  TYLENOL                                   amLODIPine 5 mg tablet   Take 5 mg by mouth 2 (two) times a day  Last time this was given:  5 mg on 2/11/2017  8:51 AM   Commonly known as:  NORVASC                                      ARIPiprazole 20 MG tablet   Take 20 mg by mouth daily  Last time this was given:  30 mg on 2/10/2017 10:10 PM   Commonly known as:  ABILIFY                                   aspirin 81 MG tablet   Take 81 mg by mouth daily                                     cephalexin 500 mg capsule   Take 1 capsule by mouth 4 (four) times a day for 7 days   Commonly known as:  Marylen Ano cloNIDine 0 3 mg tablet   Take 0 3 mg by mouth 2 (two) times a day Indications: 0 3 mg in am, 0 2 mg at noon, and 0 3 mg in pm    Last time this was given:  0 2 mg on 2/11/2017 12:06 PM   Commonly known as:  CATAPRES                                         doxazosin 1 mg tablet   Take 1 mg by mouth daily at bedtime   Last time this was given:  1 mg on 2/10/2017 10:10 PM   Commonly known as:  CARDURA                                   DULoxetine 60 mg delayed release capsule   Take 60 mg by mouth 2 (two) times a day  Last time this was given:  60 mg on 2/11/2017  8:49 AM   Commonly known as:  CYMBALTA                                      folic acid 1 mg tablet   Take 1 mg by mouth 2 (two) times a day  Last time this was given:  1 mg on 2/11/2017  8:49 AM   Commonly known as:  FOLVITE                                      furosemide 40 mg tablet   Take 40 mg by mouth 2 (two) times a day as needed   Commonly known as:  LASIX                                   GENERLAC PO   Take 30 mL by mouth daily  hydrALAZINE 50 mg tablet   Take 50 mg by mouth 3 (three) times a day  Last time this was given:  50 mg on 2/11/2017  8:50 AM   Commonly known as:  APRESOLINE                                         Levothyroxine Sodium 88 MCG Caps   Take by mouth                                   LORazepam 2 mg tablet   Take 2 mg by mouth 2 (two) times a day as needed for anxiety   Commonly known as:  ATIVAN                                   metoprolol tartrate 50 mg tablet   Take 50 mg by mouth 2 (two) times a day  Last time this was given:  50 mg on 2/11/2017  8:51 AM   Commonly known as:  LOPRESSOR                                      pantoprazole 40 mg tablet   Take 40 mg by mouth daily  Last time this was given:  40 mg on 2/11/2017  6:21 AM   Commonly known as:  PROTONIX                                   pravastatin 80 mg tablet   Take 80 mg by mouth daily     Last time this was given:  80 mg on 2/10/2017  4:23 PM   Commonly known as:  PRAVACHOL                                   predniSONE 5 mg tablet   Take 7 5 mg by mouth daily  Last time this was given:  2 5 mg on 2/11/2017  8:52 AM                                   rOPINIRole 0 25 mg tablet   Take 0 25 mg by mouth 2 (two) times a day  Last time this was given:  0 25 mg on 2/11/2017  8:52 AM   Commonly known as:  REQUIP                                      sertraline 100 mg tablet   Take 100 mg by mouth 2 (two) times a day  Last time this was given:  200 mg on 2/11/2017  8:49 AM   Commonly known as:  ZOLOFT                                      sodium bicarbonate 650 mg tablet   Take 650 mg by mouth daily   Last time this was given:  650 mg on 2/11/2017  8:50 AM                                   tacrolimus 1 mg capsule   Take 1 mg by mouth 2 (two) times a day  Last time this was given:  4 mg on 2/11/2017  8:50 AM   Commonly known as:  PROGRAF                                      traZODone 150 mg tablet   Take 150 mg by mouth daily at bedtime  Last time this was given:  150 mg on 2/9/2017  9:41 PM   Commonly known as:  DESYREL                                   Vitamin D3 3000 UNITS Tabs   Take 1 tablet by mouth daily     Last time this was given:  3,000 Units on 2/11/2017  8:51 AM                                            Summary of Your Hospitalization        About your hospitalization     You were admitted on:  February 8, 2017 You last received care in the:  [address] [place] [address]    You were discharged on:  February 11, 2017 Unit phone number:  [phone]      Reason for Hospitalization     Your primary diagnosis was:  Uti (Urinary Tract Infection)    Your diagnoses also included:  Arf (Acute Renal Failure), Benign Hypertension With Ckd (Chronic Kidney Disease) Stage Iv, Status Post Simultaneous Kidney And Pancreas Transplant, Low Bicarbonate Level, Immunosuppression      Chief Complaint     Weakness - Generalized Treatment Team     Provider Service Role Specialty Primary office phone    Jb Hua MD -- Attending Provider Internal Medicine [phone]    Tammy Moran PA-C -- Advanced Practitioner  Internal Medicine  [phone]    Tonya Gold MD Nephrology Consulting Physician  Nephrology [phone]    Micki Valdivia PA-C -- Advanced Practitioner  Internal Medicine  [phone]    Paco Flores, RN -- Registered Nurse  Medical Surgical Number not on file    Patrizia Ben -- Patient Care Assistant  Medical Surgical Number not on file      Procedures This Visit      ECG 12 lead  Once     Question:  REASON FOR EXAM:  Answer:  lethargy        EKG RESULTS              Last Resulted Labs     Date/Time Component Value Lab Status    02/11/17 0648 WBC 7 05 Final result    02/11/17 0648 HGB 11 7 Final result    02/11/17 0648 HCT 36 4 Final result    02/11/17 0648  Final result    02/11/17 0548  Final result    02/11/17 0548 K 4 1 Final result    02/11/17 0548  Final result    02/11/17 0548 CO2 18 Final result    02/11/17 0548 BUN 36 Final result    02/11/17 0548 CREATININE 1 68 Final result    02/11/17 0548 GLUCOSE 131 Final result    02/08/17 0644 ALKPHOS 141 Final result    02/08/17 0644 ALT 31 Final result    02/08/17 0644 AST 20 Final result    09/13/16 1926 AMMONIA 44 Final result    02/08/17 0644 ALBUMIN 3 0 Final result    02/08/17 0644 BILITOT 0 37 Final result    09/13/16 1926 LIPASE 141 Final result    10/24/16 0631 CHOL 155 Final result    10/24/16 0631 HDL 49 Final result    10/24/16 0631 TRIG 119 Final result    10/24/16 0631 HGBA1C 6 5 Final result    07/12/16 1709 TROPONINI <0 02 Final result    09/13/16 1926 INR 0 99 Final result      Imaging Results     Xr Chest 2 Views    Result Date: 2/8/2017  Impression: Normal examination   Workstation performed: [id number]     Us Duplex Ar/vn Abd,pelvis Limited    Result Date: 2/9/2017  Impression: Left transplant kidney demonstrates no evidence of hydronephrosis  However, there is mildly abnormal arterial waveforms in the transient kidney, which suggests renal artery stenosis  Bilateral atrophic native kidneys  Workstation performed: [id number]      Kidney And Bladder    Result Date: 2/8/2017  Impression: Left transplant kidney demonstrates no evidence of hydronephrosis  However, there is mildly abnormal arterial waveforms in the transient kidney, which suggests renal artery stenosis  Bilateral atrophic native kidneys  Workstation performed: [id number]            Follow-ups for After Discharge        Follow-up Information     Follow up with Wyatt Capone MD      Specialty:  Nephrology    Contact information:    [address] [place] [address]  [phone]        Pending Labs     Order Current Status    Tacrolimus level In process    Blood culture Preliminary result    Blood culture Preliminary result      Your Allergies  Date Reviewed: 2/8/2017    Allergen Reactions    Morphine And Related GI Intolerance         Myrbetriq (Mirabegron) Not Noted         Penicillins Hives    Tolerates cefazolin      POLST/Adv Directive          Most Recent Value    POLST  Patient has POLST, copy in chart    Advance Directive  Patient has advance directive, copy not in chart    Type of Healthcare Directive  Living will, Durable power of  for health care    [address] Surrogate  No      Puzl     To access this document and other health information online, please visit www  [email or web address] to login or create a patient portal account  For questions about any pending test results, you may contact the ordering physician or your primary care provider             Instructions for After Discharge        Activity Instructions     Activity as tolerated                 Diet Instructions     Discharge Diet       Discharge Diet:  Regular               Discharge Instructions       Urinary Tract Infection in Women   WHAT YOU NEED TO KNOW:   A urinary tract infection (UTI) is caused by bacteria that get inside your urinary tract  Your urinary tract includes your kidneys, ureters, bladder, and urethra  Urine is made in your kidneys, and it flows from the ureters to the bladder  Urine leaves the bladder through the urethra  A UTI is more common in your lower urinary tract, which includes your bladder and urethra  DISCHARGE INSTRUCTIONS:   Seek care immediately if:   · You are urinating very little or not at all  · You are vomiting  · You have a high fever with shaking chills  · You have side or back pain that gets worse  Contact your healthcare provider if:   · You have a fever  · You have white or yellow discharge from your vagina  · You do not feel better after 2 days of taking antibiotics  · You have questions or concerns about your condition or care  Medicines:   · Medicines  help treat the bacterial infection or decrease pain and burning when you urinate  You may also need medicines to decrease the urge to urinate often  · Take your medicine as directed  Call your healthcare provider if you think your medicine is not helping or if you have side effects  Tell him if you are allergic to any medicine  Keep a list of the medicines, vitamins, and herbs you take  Include the amounts, and when and why you take them  Bring the list or the pill bottles to follow-up visits  Carry your medicine list with you in case of an emergency  Follow up with your healthcare provider as directed:  Write down your questions so you remember to ask them during your visits  Self-care:   · Urinate when you feel the urge  Do not hold your urine  Urinate as soon as you feel you have to  · Drink liquids as directed  Ask how much liquid to drink each day and which liquids are best for you  You may need to drink more liquids than usual to help flush out the bacteria  Do not drink alcohol, caffeine, and citrus juices   These can irritate your bladder and increase your symptoms  · Apply heat  on your abdomen for 20 to 30 minutes every 2 hours for as many days as directed  Heat helps decrease discomfort and pressure in your bladder  © 2016 [employer] is for End User's use only and may not be sold, redistributed or otherwise used for commercial purposes  All illustrations and images included in CareNotes® are the copyrighted property of A D [employer]  or Tamir Leblanc  The above information is an  only  It is not intended as medical advice for individual conditions or treatments  Talk to your doctor, nurse or pharmacist before following any medical regimen to see if it is safe and effective for you  Discharge Weight          Most Recent Value    Weight  94 8 kg (209 lb) filed at 02/08/2017 6518      Information on Diabetes     You have been diagnosed with - or have a history of  diabetes during your hospitalization  Review the following to help you manage your diabetes  Discharge Medications:  Taking your medications as prescribed is one of the most important aspects of maintaining your blood sugar in the target range established by your physician  It is important to know the names of your medication, how they work, how much to take, and when to take them  Do not stop your prescribed medications or begin taking over-the counter or herbal medications without first speaking with your physician  Hypoglycemia (Low Blood Sugar)  Too little glucose (sugar) in your blood is called hypoglycemia or low blood sugar  Diabetes itself does not cause low blood sugar  But some of the treatments for diabetes, such as pills or insulin, may increase your risk for it  In severe cases, low blood sugar may cause you to lose consciousness or have a seizure  · Low blood sugar is typically defined as a level below 70 mg/dL, (below 60 mg/dL if pregnant)     Signs of low blood sugar (you may have one or more of these symptoms): shakiness or dizziness; cold/clammy skin or sweating; hunger;  headache; nervousness; hard/heavy heartbeat; weakness; confusion/irritability; blurred vision   Always treat low blood sugar right away, but do not overeat   What you should do if blood sugar too low:    First, check your blood sugar  If it is not possible to check your blood sugar, treat for low blood sugar anyway  ? If blood sugar is below 70 mg/dL (60 mg/dL if pregnant) OR  If unable to check blood sugar level and you have signs of low blood sugar,  eat or drink 15 to 20 grams of fast-acting sugar  This may be 3-4 glucose tablets or 4 oz  (half a cup) fruit juice or 4 oz  (half a cup) regular (non-diet) soda or 8 oz  (one cup) fat free milk, or 1 tablespoon of honey  Do not take more than this, or your blood sugar may go too high  ? Wait 15 minutes  Then recheck your blood sugar if you can   ? If your blood sugar is still too low, repeat the steps above and check your blood sugar again  If your blood sugar still has not returned to your target range, contact your health care provider or seek emergency care  ? Once your blood sugar returns to target range, eat a snack or meal    Preventing low blood sugar  ? Follow diabetic diet  Do not skip meals or snacks  ? Take your medications at the prescribed times  ? Always carry a source of fast-acting sugar and a snack when you are away from home  ? Seek further medical help if you have repeated  low or high blood sugars    Keep/call your physician for follow-up appointments for the diabetes  Diabetes Education Classes:    You are encouraged to learn how to best manage your diabetes by attending classes at one of the following locations:  [facility] ([phone])  [address] [place] [address] ([phone])  [facility] ([phone]) Educational Information     Venous Thromboembolism (VTE) / Deep Vein Thrombosis (DVT) Prevention   VTE/DVT is a disease caused by blood clots that form in veins  Blood clots can be serious and common in patients with risk factors  VTE/DVT may occur after discharge  To decrease risks, take anticoagulation medicine if ordered by your doctor  Report any calf pain, swelling or tenderness and/or shortness of breath to your doctor immediately  Smoking Cessation   If you smoke, use tobacco or nicotine, and/or are exposed to second hand smoke, you are encouraged to stop to improve your health  If you need help quitting, please talk to your health care provider or call:  · [facility]  ([phone])  · [facility] ([phone])   · [address] [place] [address] ([phone])      If your symptoms return or if your condition unexpectedly worsens from the time of discharge, notify your doctor or go to the nearest emergency room  If you think you are experiencing a medical emergency, call [phone]  Readmission Risk Score     [facility] is committed to ensuring a safe discharge plan that will allow you to continue your recovery at home  Your risk for readmission has been determined to be HIGH      To prevent readmission, please be sure to:   See your health care provider within 1 week of discharge   Follow your discharge instructions    Take your medication as prescribed   Call your health care provider with any questions or concerns

## 2017-02-08 NOTE — ED NOTES
Began to collect urine for 24hour urine collection at this time      Gilford Blanc, RN  02/08/17 8353

## 2017-02-08 NOTE — ED ATTENDING ATTESTATION
Kacey Garcia MD, saw and evaluated the patient  I have discussed the patient with the resident/non-physician practitioner and agree with the resident's/non-physician practitioner's findings, Plan of Care, and MDM as documented in the resident's/non-physician practitioner's note, except where noted  All available labs and Radiology studies were reviewed  At this point I agree with the current assessment done in the Emergency Department  I have conducted an independent evaluation of this patient including a focused history and a physical exam     60-year-old female, history of renal failure secondary to diabetes, status post pancreas and renal transplant, presenting to the emergency department for evaluation of one week history of increasing fatigue  Patient has been dieting with approximately 30 pound weight loss over the past one month  Positive nausea  No chest pain, cough, abdominal pain, diarrhea  Patient does have some intermittent shortness of breath with exertion  10 systems reviewed negative except as noted in the history of present illness  The patient is resting comfortably on a stretcher in no acute respiratory distress  The patient appears nontoxic  HEENT reveals moist mucous membranes  Head is normocephalic and atraumatic  Conjunctiva and sclera are normal  Neck is nontender and supple with full range of motion to flexion, extension, lateral rotation  No meningismus appreciated  No masses are appreciated  Lungs are clear to auscultation bilaterally without any wheezes, rales or rhonchi  Heart is regular rate and rhythm without any murmurs, rubs or gallops  Abdomen is soft and nontender without any rebound or guarding  Extremities appear grossly normal without any significant arthropathy  Patient is awake, alert, and oriented x3  The patient has normal interaction  Motor is 5 out of 5  Skin is very dry      Assessment and plan: Patient will be evaluated for metabolic dysfunction, infection, hypothyroidism, anemia  Labs Reviewed   CBC AND DIFFERENTIAL - Abnormal        Result Value Ref Range Status    WBC 10 67 (*) 4 31 - 10 16 Thousand/uL Final    RDW 15 9 (*) 11 6 - 15 1 % Final    MPV 12 9 (*) 8 9 - 12 7 fL Final    Neutrophils Absolute 7 72 (*) 1 85 - 7 62 Thousands/µL Final    RBC 4 53  3 81 - 5 12 Million/uL Final    Hemoglobin 13 8  11 5 - 15 4 g/dL Final    Hematocrit 40 3  34 8 - 46 1 % Final    MCV 89  82 - 98 fL Final    MCH 30 5  26 8 - 34 3 pg Final    MCHC 34 2  31 4 - 37 4 g/dL Final    Platelets 246  567 - 390 Thousands/uL Final    nRBC 0  /100 WBCs Final    Neutrophils Relative 73  43 - 75 % Final    Lymphocytes Relative 17  14 - 44 % Final    Monocytes Relative 7  4 - 12 % Final    Eosinophils Relative 3  0 - 6 % Final    Basophils Relative 0  0 - 1 % Final    Lymphocytes Absolute 1 80  0 60 - 4 47 Thousands/µL Final    Monocytes Absolute 0 70  0 17 - 1 22 Thousand/µL Final    Eosinophils Absolute 0 35  0 00 - 0 61 Thousand/µL Final    Basophils Absolute 0 03  0 00 - 0 10 Thousands/µL Final   BASIC METABOLIC PANEL - Abnormal     Sodium 128 (*) 136 - 145 mmol/L Final    CO2 13 (*) 21 - 32 mmol/L Final    BUN 65 (*) 5 - 25 mg/dL Final    Creatinine 2 67 (*) 0 60 - 1 30 mg/dL Final    Comment: Standardized to IDMS reference method    Potassium 4 5  3 5 - 5 3 mmol/L Final    Comment: Slightly Hemolyzed; Results May be Affected    Chloride 103  100 - 108 mmol/L Final    Anion Gap 12  4 - 13 mmol/L Final    Glucose 125  65 - 140 mg/dL Final    Comment: If the patient is fasting, the ADA then defines impaired fasting glucose as > 100 mg/dL and diabetes as > or equal to 123 mg/dL      Calcium 9 5  8 3 - 10 1 mg/dL Final    eGFR 18 7  ml/min/1 73sq m Final    Narrative:     National Kidney Disease Education Program recommendations are as follows:  GFR calculation is accurate only with a steady state creatinine  Chronic Kidney disease less than 60 ml/min/1 73 sq  meters  Kidney failure less than 15 ml/min/1 73 sq  meters  TSH, 3RD GENERATION - Abnormal     TSH 3RD GENERATON 3 750 (*) 0 358 - 3 740 uIU/mL Final    Narrative:     Patients undergoing fluorescein dye angiography may retain small amounts of fluorescein in the body for 48-72 hours post procedure  Samples containing fluorescein can produce falsely depressed TSH values  If the patient had this procedure,a specimen should be resubmitted post fluorescein clearance  The recommended reference ranges for TSH during pregnancy are as follows:  First trimester 0 1 to 2 5 uIU/mL  Second trimester  0 2 to 3 0 uIU/mL  Third trimester 0 3 to 3 0 uIU/m     URINE MICROSCOPIC - Abnormal     WBC, UA 30-50 (*) None Seen /hpf Final    Bacteria, UA Innumerable (*) None Seen, Occasional /hpf Final    RBC, UA None Seen  None Seen /hpf Final    Epithelial Cells None Seen  None Seen, Occasional /hpf Final    OTHER OBSERVATIONS WBCs Clumped   Final    MUCOUS THREADS Occasional  Occasional, Moderate, Innumerable Final   ED URINE MACROSCOPIC - Abnormal     Leukocytes, UA Large (*) Negative Final    Nitrite, UA Positive (*) Negative Final    Protein, UA Trace (*) Negative mg/dl Final    Color, UA Yellow   Final    Clarity, UA Clear   Final    pH, UA 7 0  4 5 - 8 0 Final    Glucose, UA Negative  Negative mg/dl Final    Ketones, UA Negative  Negative mg/dl Final    Urobilinogen, UA 0 2  0 2, 1 0 E U /dl E U /dl Final    Bilirubin, UA Negative  Negative Final    Blood, UA Negative  Negative Final    Specific [place], UA 1 010  1 003 - 1 030 Final    Narrative:     CLINITEK RESULT   POCT URINALYSIS DIPSTICK - Normal    Color, UA yellow   Final   URINE CULTURE   AMYLASE, URINE, TIMED     XR chest 2 views   ED Interpretation   No acute pulmonary disease      Final Result      Normal examination           Workstation performed: [id number]         US kidney and bladder    (Results Pending)

## 2017-02-08 NOTE — ED PROVIDER NOTES
History  Chief Complaint   Patient presents with    Weakness - Generalized     pt with increased weakness all week  HPI Comments: 58-year-old female with history of pancreatic transplant and kidney transplant comes to the emergency room with chief complaint of increased lethargy for the past week  Of note, patient has had this lethargy for at least 5 years and has been attributed to UTI or dehydration in the past  Patient states that she has been putting herself on a "diet" since her nephrologist told her that she was "fat" 5 months ago  Because of this patient has not been taken enough water and has been eating less  Patient states that she has some shortness of breath is associated with walking however this is chronic  Patient denies fever, chest pain, nausea, vomiting, abdominal pain, bowel complete, or any other systematic complaints except as reviewed in history present illness  History provided by:  Patient   used: No        Prior to Admission Medications   Prescriptions Last Dose Informant Patient Reported? Taking? ARIPiprazole (ABILIFY) 20 MG tablet   Yes No   Sig: Take 20 mg by mouth daily  Cholecalciferol (VITAMIN D3) 3000 UNITS TABS   Yes No   Sig: Take 1 tablet by mouth daily  DULoxetine (CYMBALTA) 60 mg delayed release capsule   Yes No   Sig: Take 60 mg by mouth 2 (two) times a day  LORazepam (ATIVAN) 2 mg tablet   Yes No   Sig: Take 2 mg by mouth 2 (two) times a day as needed for anxiety     Lactulose Encephalopathy (GENERLAC PO)   Yes No   Sig: Take 30 mL by mouth daily  Levothyroxine Sodium 88 MCG CAPS   Yes No   Sig: Take by mouth   acetaminophen (TYLENOL) 325 mg tablet   No No   Sig: Take 2 tablets (650 mg total) by mouth every 6 (six) hours as needed for mild pain or fever  amLODIPine (NORVASC) 5 mg tablet   Yes No   Sig: Take 5 mg by mouth 2 (two) times a day  aspirin 81 MG tablet   Yes No   Sig: Take 81 mg by mouth daily     cloNIDine (CATAPRES) 0 3 mg tablet   Yes No   Sig: Take 0 3 mg by mouth 2 (two) times a day Indications: 0 3 mg in am, 0 2 mg at noon, and 0 3 mg in pm      doxazosin (CARDURA) 1 mg tablet   Yes Yes   Sig: Take 1 mg by mouth daily at bedtime   folic acid (FOLVITE) 1 mg tablet   Yes No   Sig: Take 1 mg by mouth 2 (two) times a day  furosemide (LASIX) 40 mg tablet  Self Yes No   Sig: Take 40 mg by mouth 2 (two) times a day as needed     hydrALAZINE (APRESOLINE) 50 mg tablet   Yes No   Sig: Take 50 mg by mouth 3 (three) times a day  metoprolol tartrate (LOPRESSOR) 50 mg tablet   Yes No   Sig: Take 50 mg by mouth 2 (two) times a day  pantoprazole (PROTONIX) 40 mg tablet   Yes No   Sig: Take 40 mg by mouth daily  pravastatin (PRAVACHOL) 80 mg tablet   Yes No   Sig: Take 80 mg by mouth daily  predniSONE 5 mg tablet   Yes No   Sig: Take 7 5 mg by mouth daily  rOPINIRole (REQUIP) 0 25 mg tablet   Yes No   Sig: Take 0 25 mg by mouth 2 (two) times a day  sertraline (ZOLOFT) 100 mg tablet   Yes No   Sig: Take 100 mg by mouth 2 (two) times a day  sodium bicarbonate 650 mg tablet   Yes No   Sig: Take 650 mg by mouth daily   tacrolimus (PROGRAF) 1 mg capsule   Yes No   Sig: Take 1 mg by mouth 2 (two) times a day  traZODone (DESYREL) 150 mg tablet   Yes No   Sig: Take 150 mg by mouth daily at bedtime        Facility-Administered Medications: None       Past Medical History   Diagnosis Date    Anemia     Diabetes mellitus      Previous, controlled with diet    Disease of thyroid gland     History of transfusion     Hypertension     Night blindness     Psychiatric disorder     Renal disorder     Retinopathy     Status post simultaneous kidney and pancreas transplant     Toe amputation status        Past Surgical History   Procedure Laterality Date    Nephrectomy transplanted organ      Hallux valgus correction Right     Combined kidney-pancreas transplant N/A     Foot amputation through metatarsal Left     Eye surgery       cataracts    Cystoscopy w/ retrogrades N/A 10/13/2016     Procedure: CYSTOSCOPY, retrograde pyelogram, biopsy of ureteral polyp; Surgeon: Osmel Overton MD;  Location: [facility];  Service:        Family History   Problem Relation Age of Onset    Hypertension Mother     Hypertension Father     Cancer Maternal Grandfather     Cancer Paternal Grandmother     Cancer Paternal Grandfather      I have reviewed and agree with the history as documented  Social History   Substance Use Topics    Smoking status: Former Smoker     Quit date: 4/28/2012    Smokeless tobacco: Former User    Alcohol use No        Review of Systems   Constitutional: Positive for fatigue  Negative for appetite change, chills, diaphoresis and fever  HENT: Negative for congestion, ear discharge, ear pain, hearing loss, postnasal drip, rhinorrhea, sneezing and sore throat  Eyes: Negative for pain, discharge and redness  Respiratory: Negative for choking, chest tightness, shortness of breath, wheezing and stridor  Cardiovascular: Negative for chest pain and palpitations  Gastrointestinal: Negative for abdominal distention, abdominal pain, blood in stool, constipation, diarrhea, nausea and vomiting  Genitourinary: Negative for decreased urine volume, difficulty urinating, dysuria, flank pain, frequency and hematuria  Musculoskeletal: Negative for arthralgias, gait problem, joint swelling and neck pain  Skin: Negative for color change, pallor and rash  Allergic/Immunologic: Negative for environmental allergies, food allergies and immunocompromised state  Neurological: Negative for dizziness, seizures, weakness, light-headedness, numbness and headaches  Hematological: Negative for adenopathy  Does not bruise/bleed easily  Psychiatric/Behavioral: Negative for agitation and behavioral problems         Physical Exam    ED Triage Vitals   Temperature Pulse Respirations Blood Pressure SpO2   02/08/17 0722 02/08/17 0722 02/08/17 0722 02/08/17 0722 02/08/17 0722   97 3 °F (36 3 °C) 76 16 160/67 98 %      Temp Source Heart Rate Source Patient Position BP Location FiO2 (%)   02/08/17 0722 02/08/17 0800 02/08/17 0800 02/08/17 0800 --   Tympanic Monitor Lying Right arm       Pain Score       02/08/17 0722       9           Physical Exam   Constitutional: She is oriented to person, place, and time  She appears well-developed and well-nourished  She appears lethargic  HENT:   Head: Normocephalic and atraumatic  Nose: Nose normal    Mouth/Throat: Oropharynx is clear and moist    Eyes: Conjunctivae and EOM are normal  Pupils are equal, round, and reactive to light  Neck: Normal range of motion  Neck supple  Cardiovascular: Normal rate, regular rhythm and normal heart sounds  Exam reveals no gallop and no friction rub  No murmur heard  Pulmonary/Chest: Effort normal and breath sounds normal    Abdominal: Soft  Bowel sounds are normal  She exhibits no distension  There is no tenderness  There is no rebound and no guarding  Musculoskeletal: Normal range of motion  Neurological: She is oriented to person, place, and time  She appears lethargic  Skin: Skin is warm and dry  No erythema  No pallor  Psychiatric: She has a normal mood and affect  Her behavior is normal    Nursing note and vitals reviewed        ED Medications  Medications   amLODIPine (NORVASC) tablet 5 mg (5 mg Oral Given 2/9/17 1821)   ARIPiprazole (ABILIFY) tablet 30 mg (30 mg Oral Given 2/9/17 2141)   aspirin chewable tablet 81 mg (81 mg Oral Given 2/9/17 0806)   cholecalciferol (VITAMIN D3) tablet 3,000 Units (3,000 Units Oral Given 2/9/17 0807)   cloNIDine (CATAPRES) tablet 0 3 mg (0 3 mg Oral Given 2/9/17 1821)   DULoxetine (CYMBALTA) delayed release capsule 60 mg (60 mg Oral Given 7/3/81 1903)   folic acid (FOLVITE) tablet 1 mg (1 mg Oral Given 2/9/17 0807)   hydrALAZINE (APRESOLINE) tablet 50 mg (50 mg Oral Given 2/9/17 2141) levothyroxine tablet 88 mcg (88 mcg Oral Given 2/10/17 0620)   LORazepam (ATIVAN) tablet 2 mg (not administered)   metoprolol tartrate (LOPRESSOR) tablet 50 mg (50 mg Oral Given 2/9/17 1821)   pantoprazole (PROTONIX) EC tablet 40 mg (40 mg Oral Given 2/10/17 0620)   pravastatin (PRAVACHOL) tablet 80 mg (80 mg Oral Given 2/9/17 1602)   predniSONE tablet 2 5 mg (2 5 mg Oral Given 2/9/17 0809)   rOPINIRole (REQUIP) tablet 0 25 mg (0 25 mg Oral Given 2/9/17 1822)   sertraline (ZOLOFT) tablet 200 mg (200 mg Oral Given 2/9/17 0807)   traZODone (DESYREL) tablet 150 mg (150 mg Oral Given 2/9/17 2141)   cloNIDine (CATAPRES) tablet 0 2 mg (0 2 mg Oral Given 2/9/17 1243)   doxazosin (CARDURA) tablet 1 mg (1 mg Oral Given 2/9/17 2141)   lactulose 20 g/30 mL oral solution 30 g (30 g Oral Given 2/9/17 0808)   tacrolimus (PROGRAF) capsule 4 mg (4 mg Oral Given 2/9/17 0807)   cefTRIAXone (ROCEPHIN) 1,000 mg in dextrose 5 % 50 mL IVPB (0 mg Intravenous Stopped 2/9/17 1433)   tacrolimus (PROGRAF) capsule 3 mg (3 mg Oral Given 2/9/17 1821)   ondansetron (ZOFRAN) injection 4 mg (not administered)   heparin (porcine) subcutaneous injection 5,000 Units (5,000 Units Subcutaneous Given 2/10/17 0620)   acetaminophen (TYLENOL) tablet 650 mg (not administered)   sodium bicarbonate 150 mEq in dextrose 5 % 1,000 mL infusion ( Intravenous New Bag 2/9/17 1820)   sodium bicarbonate tablet 650 mg (not administered)   sodium chloride 0 9 % bolus 1,000 mL (0 mL Intravenous Stopped 2/8/17 1500)   ondansetron (ZOFRAN) injection 4 mg (4 mg Intravenous Given 2/8/17 0945)   cefTRIAXone (ROCEPHIN) 2,000 mg in dextrose 5 % 50 mL IVPB (2,000 mg Intravenous Given 2/8/17 1211)       Diagnostic Studies  Labs Reviewed   CBC AND DIFFERENTIAL - Abnormal        Result Value Ref Range Status    WBC 10 67 (*) 4 31 - 10 16 Thousand/uL Final    RDW 15 9 (*) 11 6 - 15 1 % Final    MPV 12 9 (*) 8 9 - 12 7 fL Final    Neutrophils Absolute 7 72 (*) 1 85 - 7 62 Thousands/µL Final    RBC 4 53  3 81 - 5 12 Million/uL Final    Hemoglobin 13 8  11 5 - 15 4 g/dL Final    Hematocrit 40 3  34 8 - 46 1 % Final    MCV 89  82 - 98 fL Final    MCH 30 5  26 8 - 34 3 pg Final    MCHC 34 2  31 4 - 37 4 g/dL Final    Platelets 936  333 - 390 Thousands/uL Final    nRBC 0  /100 WBCs Final    Neutrophils Relative 73  43 - 75 % Final    Lymphocytes Relative 17  14 - 44 % Final    Monocytes Relative 7  4 - 12 % Final    Eosinophils Relative 3  0 - 6 % Final    Basophils Relative 0  0 - 1 % Final    Lymphocytes Absolute 1 80  0 60 - 4 47 Thousands/µL Final    Monocytes Absolute 0 70  0 17 - 1 22 Thousand/µL Final    Eosinophils Absolute 0 35  0 00 - 0 61 Thousand/µL Final    Basophils Absolute 0 03  0 00 - 0 10 Thousands/µL Final   BASIC METABOLIC PANEL - Abnormal     Sodium 128 (*) 136 - 145 mmol/L Final    CO2 13 (*) 21 - 32 mmol/L Final    BUN 65 (*) 5 - 25 mg/dL Final    Creatinine 2 67 (*) 0 60 - 1 30 mg/dL Final    Comment: Standardized to IDMS reference method    Potassium 4 5  3 5 - 5 3 mmol/L Final    Comment: Slightly Hemolyzed; Results May be Affected    Chloride 103  100 - 108 mmol/L Final    Anion Gap 12  4 - 13 mmol/L Final    Glucose 125  65 - 140 mg/dL Final    Comment: If the patient is fasting, the ADA then defines impaired fasting glucose as > 100 mg/dL and diabetes as > or equal to 123 mg/dL  Calcium 9 5  8 3 - 10 1 mg/dL Final    eGFR 18 7  ml/min/1 73sq m Final    Narrative:     National Kidney Disease Education Program recommendations are as follows:  GFR calculation is accurate only with a steady state creatinine  Chronic Kidney disease less than 60 ml/min/1 73 sq  meters  Kidney failure less than 15 ml/min/1 73 sq  meters  TSH, 3RD GENERATION - Abnormal     TSH 3RD GENERATON 3 750 (*) 0 358 - 3 740 uIU/mL Final    Narrative:     Patients undergoing fluorescein dye angiography may retain small amounts of fluorescein in the body for 48-72 hours post procedure  Samples containing fluorescein can produce falsely depressed TSH values  If the patient had this procedure,a specimen should be resubmitted post fluorescein clearance  The recommended reference ranges for TSH during pregnancy are as follows:  First trimester 0 1 to 2 5 uIU/mL  Second trimester  0 2 to 3 0 uIU/mL  Third trimester 0 3 to 3 0 uIU/m     URINE MICROSCOPIC - Abnormal     WBC, UA 30-50 (*) None Seen /hpf Final    Bacteria, UA Innumerable (*) None Seen, Occasional /hpf Final    RBC, UA None Seen  None Seen /hpf Final    Epithelial Cells None Seen  None Seen, Occasional /hpf Final    OTHER OBSERVATIONS WBCs Clumped   Final    MUCOUS THREADS Occasional  Occasional, Moderate, Innumerable Final   CBC AND PLATELET - Abnormal     RDW 16 4 (*) 11 6 - 15 1 % Final    WBC 6 82  4 31 - 10 16 Thousand/uL Final    RBC 4 32  3 81 - 5 12 Million/uL Final    Hemoglobin 12 7  11 5 - 15 4 g/dL Final    Hematocrit 38 6  34 8 - 46 1 % Final    MCV 89  82 - 98 fL Final    MCH 29 4  26 8 - 34 3 pg Final    MCHC 32 9  31 4 - 37 4 g/dL Final    Platelets 869  754 - 390 Thousands/uL Final    MPV 12 7  8 9 - 12 7 fL Final   BASIC METABOLIC PANEL - Abnormal     Chloride 113 (*) 100 - 108 mmol/L Final    CO2 16 (*) 21 - 32 mmol/L Final    BUN 54 (*) 5 - 25 mg/dL Final    Creatinine 2 21 (*) 0 60 - 1 30 mg/dL Final    Comment: Standardized to IDMS reference method    Sodium 138  136 - 145 mmol/L Final    Potassium 4 0  3 5 - 5 3 mmol/L Final    Anion Gap 9  4 - 13 mmol/L Final    Glucose 136  65 - 140 mg/dL Final    Comment: If the patient is fasting, the ADA then defines impaired fasting glucose as > 100 mg/dL and diabetes as > or equal to 123 mg/dL      Calcium 9 2  8 3 - 10 1 mg/dL Final    eGFR 23 3  ml/min/1 73sq m Final    Narrative:     National Kidney Disease Education Program recommendations are as follows:  GFR calculation is accurate only with a steady state creatinine  Chronic Kidney disease less than 60 ml/min/1 73 sq  meters  Kidney failure less than 15 ml/min/1 73 sq  meters  BASIC METABOLIC PANEL - Abnormal     Chloride 112 (*) 100 - 108 mmol/L Final    CO2 19 (*) 21 - 32 mmol/L Final    BUN 41 (*) 5 - 25 mg/dL Final    Creatinine 1 91 (*) 0 60 - 1 30 mg/dL Final    Comment: Standardized to IDMS reference method    Glucose 145 (*) 65 - 140 mg/dL Final    Comment: If the patient is fasting, the ADA then defines impaired fasting glucose as > 100 mg/dL and diabetes as > or equal to 123 mg/dL  Sodium 141  136 - 145 mmol/L Final    Potassium 4 0  3 5 - 5 3 mmol/L Final    Anion Gap 10  4 - 13 mmol/L Final    Calcium 8 7  8 3 - 10 1 mg/dL Final    eGFR 27 6  ml/min/1 73sq m Final    Narrative:     National Kidney Disease Education Program recommendations are as follows:  GFR calculation is accurate only with a steady state creatinine  Chronic Kidney disease less than 60 ml/min/1 73 sq  meters  Kidney failure less than 15 ml/min/1 73 sq  meters  ED URINE MACROSCOPIC - Abnormal     Leukocytes, UA Large (*) Negative Final    Nitrite, UA Positive (*) Negative Final    Protein, UA Trace (*) Negative mg/dl Final    Color, UA Yellow   Final    Clarity, UA Clear   Final    pH, UA 7 0  4 5 - 8 0 Final    Glucose, UA Negative  Negative mg/dl Final    Ketones, UA Negative  Negative mg/dl Final    Urobilinogen, UA 0 2  0 2, 1 0 E U /dl E U /dl Final    Bilirubin, UA Negative  Negative Final    Blood, UA Negative  Negative Final    Specific [place], UA 1 010  1 003 - 1 030 Final    Narrative:     CLINITEK RESULT   PLATELET COUNT - Normal    Platelets 443  497 - 390 Thousands/uL Final    MPV 12 7  8 9 - 12 7 fL Final   POCT URINALYSIS DIPSTICK - Normal    Color, UA yellow   Final   URINE CULTURE    Urine Culture     Preliminary    Value: >100,000 cfu/ml Gram Negative Jerald resembling Escherichia coli   BLOOD CULTURE    Blood Culture No Growth at 24 hrs  Preliminary   BLOOD CULTURE    Blood Culture No Growth at 24 hrs  Preliminary   AMYLASE, URINE, TIMED    PERIOD 24  Hours Final    TOTAL URINE VOLUME 1500  ml Final    Amylase Urine, Timed >20433   Final    (TACROLIMUS) LEVEL    (TACROLIMUS) LEVEL       US duplex ar/vn abd,pelvis limited   Final Result      Left transplant kidney demonstrates no evidence of hydronephrosis  However, there is mildly abnormal arterial waveforms in the transient kidney, which suggests renal artery stenosis  Bilateral atrophic native kidneys  Workstation performed: [id number]         US kidney and bladder   Final Result      Left transplant kidney demonstrates no evidence of hydronephrosis  However, there is mildly abnormal arterial waveforms in the transient kidney, which suggests renal artery stenosis  Bilateral atrophic native kidneys  Workstation performed: [id number]         XR chest 2 views   ED Interpretation   No acute pulmonary disease      Final Result      Normal examination  Workstation performed: [id number]         VAS renal artery limited    (Results Pending)       Procedures  Procedures      Phone Consults  ED Phone Contact    ED Course  ED Course                             MDM  Number of Diagnoses or Management Options  Diagnosis management comments: 69-year-old female comes emergency room chief complaint of increased lethargy  Medical management decision making: Given patient's pancreatic and renal transplant and core morbidities we'll do a broad workup consisting of CBC, BMP, TSH, urinalysis, chest x-ray, EKG    CritCare Time    Disposition  Final diagnoses:   UTI (urinary tract infection)   H/O pancreas transplant   H/O kidney transplant     ED Disposition     ED Disposition Condition Comment    Admit  Case was discussed with Dr Carloz Danielle and the patient's admission status was agreed to be Admission Status: observation status to the service of Dr Carloz Danielle           Follow-up Information     None        Current Discharge Medication List      CONTINUE these medications which have NOT CHANGED    Details   doxazosin (CARDURA) 1 mg tablet Take 1 mg by mouth daily at bedtime      acetaminophen (TYLENOL) 325 mg tablet Take 2 tablets (650 mg total) by mouth every 6 (six) hours as needed for mild pain or fever  Qty: 30 tablet, Refills: 0      amLODIPine (NORVASC) 5 mg tablet Take 5 mg by mouth 2 (two) times a day  ARIPiprazole (ABILIFY) 20 MG tablet Take 20 mg by mouth daily  aspirin 81 MG tablet Take 81 mg by mouth daily  Cholecalciferol (VITAMIN D3) 3000 UNITS TABS Take 1 tablet by mouth daily  cloNIDine (CATAPRES) 0 3 mg tablet Take 0 3 mg by mouth 2 (two) times a day Indications: 0 3 mg in am, 0 2 mg at noon, and 0 3 mg in pm         DULoxetine (CYMBALTA) 60 mg delayed release capsule Take 60 mg by mouth 2 (two) times a day  folic acid (FOLVITE) 1 mg tablet Take 1 mg by mouth 2 (two) times a day  furosemide (LASIX) 40 mg tablet Take 40 mg by mouth 2 (two) times a day as needed        hydrALAZINE (APRESOLINE) 50 mg tablet Take 50 mg by mouth 3 (three) times a day  Lactulose Encephalopathy (GENERLAC PO) Take 30 mL by mouth daily  Levothyroxine Sodium 88 MCG CAPS Take by mouth      LORazepam (ATIVAN) 2 mg tablet Take 2 mg by mouth 2 (two) times a day as needed for anxiety        metoprolol tartrate (LOPRESSOR) 50 mg tablet Take 50 mg by mouth 2 (two) times a day  pantoprazole (PROTONIX) 40 mg tablet Take 40 mg by mouth daily  pravastatin (PRAVACHOL) 80 mg tablet Take 80 mg by mouth daily  predniSONE 5 mg tablet Take 7 5 mg by mouth daily  rOPINIRole (REQUIP) 0 25 mg tablet Take 0 25 mg by mouth 2 (two) times a day  sertraline (ZOLOFT) 100 mg tablet Take 100 mg by mouth 2 (two) times a day  sodium bicarbonate 650 mg tablet Take 650 mg by mouth daily      tacrolimus (PROGRAF) 1 mg capsule Take 1 mg by mouth 2 (two) times a day        traZODone (DESYREL) 150 mg tablet Take 150 mg by mouth daily at bedtime  No discharge procedures on file  ED Provider  Attending physically available and evaluated Joceline Laxmi  RAJESH managed the patient along with the ED Attending      Electronically Signed by       Janis Sebastian MD  Resident  02/10/17 4486

## 2017-02-08 NOTE — CONSULTS
Consultation - Nephrology   Jennifer Hua 45 y o  female MRN: [id number]  Unit/Bed#: ED 12 Encounter: [id number]    ASSESSMENT and PLAN:  1  EDUARDO: likely prerenal from poor po intake and diuretics +/- ATN from infection    -UA: trace protein, large leuks, +nitrites, 30-50 WBC, innumerable bacteria    -renal ultrasound pending   -will hold lasix and give IVF   -change fluids to bicarb drip because bicarb only 13    -check am BMP   2  CKD IV: baseline creatinine around 1 9 and follows with Dr Marlane Opitz  3  S/p renal and pancreas transplant: continue prednisone 7 5mg daily and tacrolimus 4mg in am and 3mg in pm   4  Hyponatremia: likely due to hypovolemia and will trend on normal saline    -check am BMP   5  Non-anion gap metabolic acidosis: start T1G with 150meq sodium bicarb at 75cc/h  6  Hypertension: uncontrolled but did not have meds yet today so would restart home meds   7  UTI: culture pending and is getting a dose of ceftriaxone now     HISTORY OF PRESENT ILLNESS:  Requesting Physician: Dwayne Veras MD  Reason for Consult: EDUARDO, renal transplant     Jennifer Hua is a 45y o  year old female who was admitted to [facility] after presenting with UTI symptoms  She complains of having worsening fatigue, weakness, dysuria, and trouble urinating for the past few days  Her mother is at bedside and reports she also had decreased po intake and lethargy  She normally gets this way when she has a UTI so she came to the ER  Patient has a history of renal transplant and does get frequent complicated UTI's  Urine studies are consistent with UTI and also had EDUARDO on blood work  No vomiting, diarrhea or NSAID use at home  No chest pain or shortness of breath       PAST MEDICAL HISTORY:  Past Medical History   Diagnosis Date    Anemia     Diabetes mellitus      Previous, controlled with diet    Disease of thyroid gland     History of transfusion     Hypertension     Night blindness     Psychiatric disorder  Renal disorder     Retinopathy     Status post simultaneous kidney and pancreas transplant     Toe amputation status        PAST SURGICAL HISTORY:  Past Surgical History   Procedure Laterality Date    Nephrectomy transplanted organ      Hallux valgus correction Right     Combined kidney-pancreas transplant N/A     Foot amputation through metatarsal Left     Eye surgery       cataracts    Cystoscopy w/ retrogrades N/A 10/13/2016     Procedure: CYSTOSCOPY, retrograde pyelogram, biopsy of ureteral polyp; Surgeon: Tabitha Solano MD;  Location: [facility];  Service:        ALLERGIES:  Allergies   Allergen Reactions    Morphine And Related GI Intolerance    Myrbetriq [Mirabegron]     Penicillins Hives     Tolerates cefazolin       SOCIAL HISTORY:  History   Alcohol Use No     History   Drug Use    Yes    Special: Hydrocodone     History   Smoking Status    Former Smoker    Quit date: 4/28/2012   Smokeless Tobacco    Former User       FAMILY HISTORY:  Family History   Problem Relation Age of Onset    Hypertension Mother     Hypertension Father     Cancer Maternal Grandfather     Cancer Paternal Grandmother     Cancer Paternal Grandfather        MEDICATIONS:  Home meds reviewed     REVIEW OF SYSTEMS:  A complete review of systems was done  Pertinent positives and negatives noted in the HPI but otherwise the review of systems is negative      PHYSICAL EXAM:  Current Weight: Weight - Scale: 94 8 kg (209 lb)  First Weight: Weight - Scale: 94 8 kg (209 lb)  Vitals:    02/08/17 1412   BP: (!) 180/77   Pulse: 75   Resp: 18   Temp:    SpO2: 96%       Intake/Output Summary (Last 24 hours) at 02/08/17 1430  Last data filed at 02/08/17 1326   Gross per 24 hour   Intake                0 ml   Output              700 ml   Net             -700 ml     General: no acute distress   Skin: no rash   Eyes:sclera anicteric   ENT: moist mucous membranes   Neck: supple, symmetric   Chest: clear to auscultation   CVS: regular rate and rhythm   Abdomen: soft, non-tender, non-distended   Extremities: no edema   Neuro: awake and alert   Psych: appropriate affect    Lab Results:   Lab Results   Component Value Date    WBC 10 67 (H) 02/08/2017    HGB 13 8 02/08/2017    HCT 40 3 02/08/2017    MCV 89 02/08/2017     02/08/2017     Lab Results   Component Value Date    GLUCOSE 125 02/08/2017    CALCIUM 9 5 02/08/2017     (L) 02/08/2017    K 4 5 02/08/2017    CO2 13 (L) 02/08/2017     02/08/2017    BUN 65 (H) 02/08/2017    CREATININE 2 67 (H) 02/08/2017     Lab Results   Component Value Date    CALCIUM 9 5 02/08/2017    PHOS 3 7 02/08/2017

## 2017-02-08 NOTE — ED NOTES
Patient aware urine sample is needed   Attempted once but unable to give sample      Kacey Baldwin, RN  02/08/17 8314

## 2017-02-09 ENCOUNTER — APPOINTMENT (INPATIENT)
Dept: RADIOLOGY | Facility: HOSPITAL | Age: 52
DRG: 683 | End: 2017-02-09
Payer: MEDICARE

## 2017-02-09 PROBLEM — E87.8 LOW BICARBONATE LEVEL: Status: ACTIVE | Noted: 2017-02-09

## 2017-02-09 PROBLEM — D84.9 IMMUNOSUPPRESSION (HCC): Status: ACTIVE | Noted: 2017-02-09

## 2017-02-09 LAB
ANION GAP SERPL CALCULATED.3IONS-SCNC: 9 MMOL/L (ref 4–13)
BUN SERPL-MCNC: 54 MG/DL (ref 5–25)
CALCIUM SERPL-MCNC: 9.2 MG/DL (ref 8.3–10.1)
CHLORIDE SERPL-SCNC: 113 MMOL/L (ref 100–108)
CO2 SERPL-SCNC: 16 MMOL/L (ref 21–32)
CREAT SERPL-MCNC: 2.21 MG/DL (ref 0.6–1.3)
ERYTHROCYTE [DISTWIDTH] IN BLOOD BY AUTOMATED COUNT: 16.4 % (ref 11.6–15.1)
GFR SERPL CREATININE-BSD FRML MDRD: 23.3 ML/MIN/1.73SQ M
GLUCOSE SERPL-MCNC: 136 MG/DL (ref 65–140)
HCT VFR BLD AUTO: 38.6 % (ref 34.8–46.1)
HGB BLD-MCNC: 12.7 G/DL (ref 11.5–15.4)
Lab: NORMAL
MCH RBC QN AUTO: 29.4 PG (ref 26.8–34.3)
MCHC RBC AUTO-ENTMCNC: 32.9 G/DL (ref 31.4–37.4)
MCV RBC AUTO: 89 FL (ref 82–98)
PERIOD: 24 HOURS
PLATELET # BLD AUTO: 211 THOUSANDS/UL (ref 149–390)
PMV BLD AUTO: 12.7 FL (ref 8.9–12.7)
POTASSIUM SERPL-SCNC: 4 MMOL/L (ref 3.5–5.3)
RBC # BLD AUTO: 4.32 MILLION/UL (ref 3.81–5.12)
SODIUM SERPL-SCNC: 138 MMOL/L (ref 136–145)
SPECIMEN VOL UR: 1500 ML
WBC # BLD AUTO: 6.82 THOUSAND/UL (ref 4.31–10.16)

## 2017-02-09 PROCEDURE — 85027 COMPLETE CBC AUTOMATED: CPT | Performed by: PHYSICIAN ASSISTANT

## 2017-02-09 PROCEDURE — 80197 ASSAY OF TACROLIMUS: CPT | Performed by: PHYSICIAN ASSISTANT

## 2017-02-09 PROCEDURE — 82150 ASSAY OF AMYLASE: CPT | Performed by: EMERGENCY MEDICINE

## 2017-02-09 PROCEDURE — 93975 VASCULAR STUDY: CPT

## 2017-02-09 PROCEDURE — 80048 BASIC METABOLIC PNL TOTAL CA: CPT | Performed by: PHYSICIAN ASSISTANT

## 2017-02-09 RX ADMIN — HEPARIN SODIUM 5000 UNITS: 5000 INJECTION, SOLUTION INTRAVENOUS; SUBCUTANEOUS at 13:06

## 2017-02-09 RX ADMIN — PRAVASTATIN SODIUM 80 MG: 80 TABLET ORAL at 16:02

## 2017-02-09 RX ADMIN — CLONIDINE HYDROCHLORIDE 0.3 MG: 0.1 TABLET ORAL at 08:07

## 2017-02-09 RX ADMIN — METOPROLOL TARTRATE 50 MG: 50 TABLET ORAL at 08:07

## 2017-02-09 RX ADMIN — AMLODIPINE BESYLATE 5 MG: 5 TABLET ORAL at 18:21

## 2017-02-09 RX ADMIN — ARIPIPRAZOLE 30 MG: 15 TABLET ORAL at 21:41

## 2017-02-09 RX ADMIN — AMLODIPINE BESYLATE 5 MG: 5 TABLET ORAL at 08:07

## 2017-02-09 RX ADMIN — PANTOPRAZOLE SODIUM 40 MG: 40 TABLET, DELAYED RELEASE ORAL at 05:48

## 2017-02-09 RX ADMIN — VITAMIN D, TAB 1000IU (100/BT) 3000 UNITS: 25 TAB at 08:07

## 2017-02-09 RX ADMIN — CEFTRIAXONE 1000 MG: 1 INJECTION, POWDER, FOR SOLUTION INTRAMUSCULAR; INTRAVENOUS at 12:44

## 2017-02-09 RX ADMIN — HYDRALAZINE HYDROCHLORIDE 50 MG: 50 TABLET ORAL at 16:01

## 2017-02-09 RX ADMIN — ASPIRIN 81 MG 81 MG: 81 TABLET ORAL at 08:06

## 2017-02-09 RX ADMIN — LEVOTHYROXINE SODIUM 88 MCG: 88 TABLET ORAL at 05:48

## 2017-02-09 RX ADMIN — DULOXETINE HYDROCHLORIDE 60 MG: 60 CAPSULE, DELAYED RELEASE ORAL at 18:21

## 2017-02-09 RX ADMIN — METOPROLOL TARTRATE 50 MG: 50 TABLET ORAL at 18:21

## 2017-02-09 RX ADMIN — SODIUM CHLORIDE 75 ML/HR: 0.9 INJECTION, SOLUTION INTRAVENOUS at 08:02

## 2017-02-09 RX ADMIN — TACROLIMUS 3 MG: 1 CAPSULE ORAL at 18:21

## 2017-02-09 RX ADMIN — DULOXETINE HYDROCHLORIDE 60 MG: 60 CAPSULE, DELAYED RELEASE ORAL at 08:07

## 2017-02-09 RX ADMIN — ROPINIROLE 0.25 MG: 0.25 TABLET, FILM COATED ORAL at 08:09

## 2017-02-09 RX ADMIN — PREDNISONE 2.5 MG: 2.5 TABLET ORAL at 08:09

## 2017-02-09 RX ADMIN — LACTULOSE 30 G: 10 SOLUTION ORAL at 08:08

## 2017-02-09 RX ADMIN — SODIUM BICARBONATE: 84 INJECTION, SOLUTION INTRAVENOUS at 18:20

## 2017-02-09 RX ADMIN — HEPARIN SODIUM 5000 UNITS: 5000 INJECTION, SOLUTION INTRAVENOUS; SUBCUTANEOUS at 21:41

## 2017-02-09 RX ADMIN — SODIUM BICARBONATE 650 MG TABLET 650 MG: at 08:07

## 2017-02-09 RX ADMIN — SERTRALINE HYDROCHLORIDE 200 MG: 100 TABLET, FILM COATED ORAL at 08:07

## 2017-02-09 RX ADMIN — HEPARIN SODIUM 5000 UNITS: 5000 INJECTION, SOLUTION INTRAVENOUS; SUBCUTANEOUS at 05:48

## 2017-02-09 RX ADMIN — ROPINIROLE 0.25 MG: 0.25 TABLET, FILM COATED ORAL at 18:22

## 2017-02-09 RX ADMIN — CLONIDINE HYDROCHLORIDE 0.3 MG: 0.1 TABLET ORAL at 18:21

## 2017-02-09 RX ADMIN — DOXAZOSIN MESYLATE 1 MG: 1 TABLET ORAL at 21:41

## 2017-02-09 RX ADMIN — FOLIC ACID 1 MG: 1 TABLET ORAL at 08:07

## 2017-02-09 RX ADMIN — HYDRALAZINE HYDROCHLORIDE 50 MG: 50 TABLET ORAL at 21:41

## 2017-02-09 RX ADMIN — TRAZODONE HYDROCHLORIDE 150 MG: 100 TABLET ORAL at 21:41

## 2017-02-09 RX ADMIN — TACROLIMUS 4 MG: 1 CAPSULE ORAL at 08:07

## 2017-02-09 RX ADMIN — HYDRALAZINE HYDROCHLORIDE 50 MG: 50 TABLET ORAL at 08:07

## 2017-02-09 RX ADMIN — CLONIDINE HYDROCHLORIDE 0.2 MG: 0.1 TABLET ORAL at 12:43

## 2017-02-09 NOTE — SOCIAL WORK
CM met with pt and pt aware cm role at discharge   Pt lives in an apartment alone   There is one step to get in and once in everything on one level   Prior to admission pt was ambulating with a cane and pt has a walker and shower chair  Pt was independent all ADL"s   Pt was open to [facility] in the past and was at [place] [id number] for short term rehab  Pt uses [facility] [address] and her PCP is Dr Brendon Valdivia and pt states her dad Dheeraj Adams is her POA  When medically clear for discharge pt mother Hamzah Mays [phone] will trasnport home   CM reviewed d/c planning process including the following: identifying help at home, patient preference for d/c planning needs, Discharge Lounge, [facility] Meds to Bed program, availability of treatment team to discuss questions or concerns patient and/or family may have regarding understanding medications and recognizing signs and symptoms once discharged  CM also encouraged patient to follow up with all recommended appointments after discharge  Patient advised of importance for patient and family to participate in managing patients medical well being

## 2017-02-09 NOTE — PLAN OF CARE
Problem: Potential for Falls  Goal: Patient will remain free of falls  INTERVENTIONS:  - Assess patient frequently for physical needs  - Identify cognitive and physical deficits and behaviors that affect risk of falls    - [place] fall precautions as indicated by assessment   - Educate patient/family on patient safety including physical limitations  - Instruct patient to call for assistance with activity based on assessment  - Modify environment to reduce risk of injury  - Consider OT/PT consult to assist with strengthening/mobility   Outcome: Progressing

## 2017-02-09 NOTE — PLAN OF CARE
Problem: DISCHARGE PLANNING - CARE MANAGEMENT  Goal: Discharge to post-acute care or home with appropriate resources  INTERVENTIONS:  - Conduct assessment to determine patient/family and health care team treatment goals, and need for post-acute services based on payer coverage, community resources, and patient preferences, and barriers to discharge  - Address psychosocial, clinical, and financial barriers to discharge as identified in assessment in conjunction with the patient/family and health care team  - Arrange appropriate level of post-acute services according to patients needs and preference and payer coverage in collaboration with the physician and health care team  - Communicate with and update the patient/family, physician, and health care team regarding progress on the discharge plan  - Arrange appropriate transportation to post-acute venues  When medically clear for discharge pt will go home with Family   Outcome: Progressing

## 2017-02-09 NOTE — PROGRESS NOTES
[facility] Internal Medicine Progress Note  Patient: Nora Alvarez 45 y o  female   MRN: [id number]  PCP: Traci Monroe MD  Unit/Bed#: [facility] 813-01 Encounter: [id number]  Date Of Visit: 02/09/17    Assessment:    Principal Problem:    UTI (urinary tract infection)  Active Problems:    ARF (acute renal failure)    Essential hypertension    Status post simultaneous kidney and pancreas transplant      Plan:    · UTI-on day #2 ceftriaxone, urine cx pending, blood cx pending  · Afebrile, no leukocytosis on cbc  · Dysuria, frequency, suprapubic discomfort resolved  · EDUARDO  · Likely pre-renal due to poor PO intake and diuretic use  · Renal following  · Lasix on hold; on bicarb gtt  · Cr improving from 2 7 to 2 2 this AM, still not at baseline  · CKD stage IV: baseline Cr 1 9, Cr improving this AM  · Follows with Dr Graciela Bardales as outpatient  · Renal U/S without any hydronephrosis on left transplant kidney but mildly abnormal arterial waveforms noted suggesting WILIAN  · HTN  · BPs improved, continue home med regimen  · S/P renal and pancreas transplant  · On daily prednisone and tacrolimus  · Hyponatremia  · Resolved      VTE Pharmacologic Prophylaxis:   Pharmacologic: Heparin  Mechanical VTE Prophylaxis in Place: Yes    Patient Centered Rounds: I have performed bedside rounds with nursing staff today  Discussions with Specialists or Other Care Team Provider: appreciate renal input    Education and Discussions with Family / Patient: patient  Time Spent for Care: 45 minutes  More than 50% of total time spent on counseling and coordination of care as described above  Current Length of Stay: 1 day(s)    Current Patient Status: Inpatient   Certification Statement: The patient will continue to require additional inpatient hospital stay due to need IV abx, monitoring of EDUARDO on CKD with IVF (bicarb gtt)    Discharge Plan: not yet medically stable  Perhaps 24 hours?     Code Status: Level 1 - Full Code      Subjective: Feeling better today  No fevers or chills  No frequency or dysuria  She feels some mild abdominal pain 2/2 breakfast this am but otherwise feeling well  Objective:     Vitals:   Temp (24hrs), Av 2 °F (36 8 °C), Min:98 2 °F (36 8 °C), Max:98 2 °F (36 8 °C)    HR:  [67-77] 71  Resp:  [18] 18  BP: (127-204)/(58-88) 127/58  SpO2:  [94 %-98 %] 94 %  Body mass index is 32 73 kg/(m^2)  Input and Output Summary (last 24 hours): Intake/Output Summary (Last 24 hours) at 17 0758  Last data filed at 17 0550   Gross per 24 hour   Intake             1140 ml   Output             2200 ml   Net            -1060 ml       Physical Exam:     Physical Exam   Constitutional: No distress  Cardiovascular: Normal rate  No murmur heard  Pulmonary/Chest: Effort normal and breath sounds normal  No respiratory distress  She has no wheezes  She has no rales  Abdominal: Soft  Bowel sounds are normal  She exhibits no distension  There is no tenderness  Genitourinary:   Genitourinary Comments: No cva tenderness   Musculoskeletal: She exhibits no edema  Skin: Skin is warm and dry  She is not diaphoretic  Vitals reviewed  Additional Data:     Labs:      Results from last 7 days  Lab Units 17  0651  17  0829   WBC Thousand/uL 6 82  --  10 67*   HEMOGLOBIN g/dL 12 7  --  13 8   HEMATOCRIT % 38 6  --  40 3   PLATELETS Thousands/uL 211  < > 223   NEUTROS PCT %  --   --  73   LYMPHS PCT %  --   --  17   MONOS PCT %  --   --  7   EOS PCT %  --   --  3   < > = values in this interval not displayed      Results from last 7 days  Lab Units 17  0651  17  0644   SODIUM mmol/L 138  < > 129*   POTASSIUM mmol/L 4 0  < > 4 4   CHLORIDE mmol/L 113*  < > 103   CO2 mmol/L 16*  < > 14*   BUN mg/dL 54*  < > 64*   CREATININE mg/dL 2 21*  < > 2 70*   CALCIUM mg/dL 9 2  < > 9 4   TOTAL PROTEIN g/dL  --   --  9 0*   BILIRUBIN TOTAL mg/dL  --   --  0 37   ALK PHOS U/L  --   --  141*   ALT U/L  --   -- 31   AST U/L  --   --  20   GLUCOSE RANDOM mg/dL 136  < > 143*   < > = values in this interval not displayed  * I Have Reviewed All Lab Data Listed Above  * Additional Pertinent Lab Tests Reviewed: [address] Admission Reviewed    Imaging:    Imaging Reports Reviewed Today Include: CXR, US kidney/bladder  Imaging Personally Reviewed by Myself Includes:  none    Recent Cultures (last 7 days):           Last 24 Hours Medication List:     amLODIPine 5 mg Oral BID   ARIPiprazole 30 mg Oral HS   aspirin 81 mg Oral Daily   cefTRIAXone 1,000 mg Intravenous Q24H   cholecalciferol 3,000 Units Oral Daily   cloNIDine 0 2 mg Oral Daily With Lunch   cloNIDine 0 3 mg Oral BID   doxazosin 1 mg Oral HS   DULoxetine 60 mg Oral BID   folic acid 1 mg Oral Daily   heparin (porcine) 5,000 Units Subcutaneous Q8H [address]   hydrALAZINE 50 mg Oral TID   lactulose 30 g Oral Daily   levothyroxine 88 mcg Oral Early Morning   metoprolol tartrate 50 mg Oral BID   pantoprazole 40 mg Oral Early Morning   pravastatin 80 mg Oral Daily With Dinner   predniSONE 2 5 mg Oral Daily   rOPINIRole 0 25 mg Oral BID   sertraline 200 mg Oral Daily   sodium bicarbonate 650 mg Oral Daily   tacrolimus 3 mg Oral After Dinner   tacrolimus 4 mg Oral Daily   traZODone 150 mg Oral HS        Today, Patient Was Seen By: Henna Casarez PA-C    ** Please Note: Dragon 360 Dictation voice to text software may have been used in the creation of this document   **

## 2017-02-09 NOTE — PROGRESS NOTES
NEPHROLOGY PROGRESS NOTE   Flores Sheikh 45 y o  female MRN: [id number]  Unit/Bed#: [facility] 813-01 Encounter: [id number]  Reason for Consult: EDUARDO/CKd    ASSESSMENT AND PLAN:  Patient is 58-year-old female status post kidney pancreas transplant about 20 years ago, chronic allograft dysfunction with CK D, baseline creatinine around 1 9, came with generalized weakness, very poor by mouth intake, dysuria and found to have AK I on CK D  Nonoliguric EDUARDO on CK D stage IV with baseline creatinine 1 9 (follows with Dr Faraz Rhoades)  - Creatinine slowly improving to 2 2 with IV fluids  Peak creatinine level 2 7 on admission   - EDUARDO likely prerenal in etiology + UTI  - Urinalysis showed no hematuria, trace proteinuria, positive nitrate, 30-50 WBC, innumerable bacteria  - Continue IV fluid for now  Discontinue IV normal saline and start patient on IV bicarbonate drip  Low bicarbonate, likely secondary to metabolic acidosis due to EDUARDO on CK D   - Change IV fluid as above  - Bicarbonate slowly improving  Hypertension, labile with occasional higher readings  Continue to closely monitor on current antihypertensive regimen  Avoid low BP  Goal SBP 140s to 150s for now  Status post kidney pancreas transplant about 20 years ago as per patient  (at [facility])  - Patient has 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   - Transplant kidney ultrasound shows no evidence of hydronephrosis  Mildly abnormal arterial waveform in the transplant kidney suggestive of renal artery stenosis  RI 0 7-0 8  - Monitor for now given improving renal function and overall acceptable blood pressure  Immunosuppression with Prograf and prednisone  - Check Prograf level in a m  Ecoli UTI, patient initially had dysuria although her urinary symptoms are much better today  - Awaiting final culture results with sensitivity  Currently on IV Rocephin as per primary care  SUBJECTIVE:  Patient seen and examined at bedside   No chest pain, shortness of breath, nausea, vomiting, abdominal pain or diarrhea  Dysuria is overall better than before  OBJECTIVE:  Current Weight: Weight - Scale: 94 8 kg (209 lb)  Vitals:    02/09/17 0730   BP: 165/71   Pulse: 80   Resp: 18   Temp: 98 2 °F (36 8 °C)   SpO2: 94%       Intake/Output Summary (Last 24 hours) at 02/09/17 0956  Last data filed at 02/09/17 0813   Gross per 24 hour   Intake             1855 ml   Output             2200 ml   Net             -345 ml     Physical Examination:  General: no acute distress   HEENT: PERRLA, EOMI  Neck: supple  Respiratory:  B/l air entry present  CVS: s1, s2 present  GI: soft, NT, ND, BS+nt, allograft site seems okay without tenderness     CNS: AAOX3  Extremities:no edema in LE  Skin:  unremarkable    Medications:    Current Facility-Administered Medications:     acetaminophen (TYLENOL) tablet 650 mg, 650 mg, Oral, Q6H PRN, Lupe Gonzalez PA-C    amLODIPine (NORVASC) tablet 5 mg, 5 mg, Oral, BID, Lupe Gonzalez PA-C, 5 mg at 02/09/17 0807    ARIPiprazole (ABILIFY) tablet 30 mg, 30 mg, Oral, HS, Lupe Gonzalez PA-C, 30 mg at 02/08/17 2113    aspirin chewable tablet 81 mg, 81 mg, Oral, Daily, Lupe Gonzalez PA-C, 81 mg at 02/09/17 0806    cefTRIAXone (ROCEPHIN) 1,000 mg in dextrose 5 % 50 mL IVPB, 1,000 mg, Intravenous, Q24H, Lupe Gonzalez PA-C    cholecalciferol (VITAMIN D3) tablet 3,000 Units, 3,000 Units, Oral, Daily, Kamini Moss PA-C, 3,000 Units at 02/09/17 0807    cloNIDine (CATAPRES) tablet 0 2 mg, 0 2 mg, Oral, Daily With Lunch, Lupe Gonzalez PA-C    cloNIDine (CATAPRES) tablet 0 3 mg, 0 3 mg, Oral, BID, Lupe Gonzalez PA-C, 0 3 mg at 02/09/17 0807    doxazosin (CARDURA) tablet 1 mg, 1 mg, Oral, HS, Lupe Gonzalez PA-C, 1 mg at 02/08/17 2113    DULoxetine (CYMBALTA) delayed release capsule 60 mg, 60 mg, Oral, BID, Lupe Gonzalez PA-C, 60 mg at 43/19/15 4188    folic acid (FOLVITE) tablet 1 mg, 1 mg, Oral, Daily, Kamini Moss PA-C, 1 mg at 02/09/17 0807   heparin (porcine) subcutaneous injection 5,000 Units, 5,000 Units, Subcutaneous, Q8H [facility], 5,000 Units at 02/09/17 0548 **AND** Platelet count, , , Once, Lupe Gonzalez PA-C    hydrALAZINE (APRESOLINE) tablet 50 mg, 50 mg, Oral, TID, Lupe Gonzalez PA-C, 50 mg at 02/09/17 0807    lactulose 20 g/30 mL oral solution 30 g, 30 g, Oral, Daily, Lupe Gonzalez PA-C, 30 g at 02/09/17 0808    levothyroxine tablet 88 mcg, 88 mcg, Oral, Early Morning, Lupe Gonzalez PA-C, 88 mcg at 02/09/17 0548    LORazepam (ATIVAN) tablet 2 mg, 2 mg, Oral, BID PRN, Classie Monday, JOHN    metoprolol tartrate (LOPRESSOR) tablet 50 mg, 50 mg, Oral, BID, Lupe Gonzalez PA-C, 50 mg at 02/09/17 0807    ondansetron (ZOFRAN) injection 4 mg, 4 mg, Intravenous, Q6H PRN, Lupe Gonzalez PA-C    pantoprazole (PROTONIX) EC tablet 40 mg, 40 mg, Oral, Early Morning, Lupe Gonzalez PA-C, 40 mg at 02/09/17 0548    pravastatin (PRAVACHOL) tablet 80 mg, 80 mg, Oral, Daily With Dinner, Lupe Gonzalez PA-C, 80 mg at 02/08/17 1851    predniSONE tablet 2 5 mg, 2 5 mg, Oral, Daily, Lupe Gonzalez PA-C, 2 5 mg at 02/09/17 0809    rOPINIRole (REQUIP) tablet 0 25 mg, 0 25 mg, Oral, BID, Lupe Gonzalez PA-C, 0 25 mg at 02/09/17 0809    sertraline (ZOLOFT) tablet 200 mg, 200 mg, Oral, Daily, Lupe Gonzalez PA-C, 200 mg at 02/09/17 0807    sodium bicarbonate 150 mEq in dextrose 5 % 1,000 mL infusion, , Intravenous, Continuous, Raquel Gore PA-C, Stopped at 02/08/17 1813    sodium bicarbonate tablet 650 mg, 650 mg, Oral, Daily, Lupe Gonzalez PA-C, 650 mg at 02/09/17 0807    sodium chloride 0 9 % infusion, 75 mL/hr, Intravenous, Continuous, Lupe Gonzalez PA-C, Last Rate: 75 mL/hr at 02/09/17 0802, 75 mL/hr at 02/09/17 0802    tacrolimus (PROGRAF) capsule 3 mg, 3 mg, Oral, After Dinner, Classerik Monday, JOHN, 3 mg at 02/08/17 1851    tacrolimus (PROGRAF) capsule 4 mg, 4 mg, Oral, Daily, Classie Monday, JOHN, 4 mg at 02/09/17 0807    traZODone ([facility]) tablet 150 mg, 150 mg, Oral, HS, Stacie MerinoJOHN dominguez, 150 mg at 02/08/17 2115    Laboratory Results:    Results from last 7 days  Lab Units 02/09/17  0651 02/08/17  1824 02/08/17  0829 02/08/17  0644   WBC Thousand/uL 6 82  --  10 67* 10 05   HEMOGLOBIN g/dL 12 7  --  13 8 13 9   HEMATOCRIT % 38 6  --  40 3 41 6   PLATELETS Thousands/uL 211 218 223 236   SODIUM mmol/L 138  --  128* 129*   POTASSIUM mmol/L 4 0  --  4 5 4 4   CHLORIDE mmol/L 113*  --  103 103   CO2 mmol/L 16*  --  13* 14*   BUN mg/dL 54*  --  65* 64*   CREATININE mg/dL 2 21*  --  2 67* 2 70*   CALCIUM mg/dL 9 2  --  9 5 9 4   MAGNESIUM mg/dL  --   --   --  2 5   PHOSPHORUS mg/dL  --   --   --  3 7   ALBUMIN g/dL  --   --   --  3 0*   TOTAL PROTEIN g/dL  --   --   --  9 0*   GLUCOSE RANDOM mg/dL 136  --  125 143*       Results for orders placed in visit on 04/18/14   XR chest portable    Narrative CHEST  PORTABLE   INDICATION- Central line placement    COMPARISON-  July 16, 2011   VIEWS-   AP frontal    1 image   FINDINGS-   The cardiomediastinal silhouette is normal   Right IJ central venous   catheter is visible with the tip in the superior vena cava just above   the cavoatrial junction  The lungs are clear  No pleural effusion  No visible pneumothorax  Pulmonary vasculature is normal caliber  Bony thorax unremarkable  IMPRESSION-   Tip of right IJ catheter is in the superior vena cava just above the   cavoatrial junction  No visible pneumothorax     Clear lungs   Transcribed on- [id number]           - DIVYA Cuello MD        Reading DIVYA Gary MD        Releasing DIVYA Gary MD        Released Date Time- 04/18/14 1640      ------------------------------------------------------------------------------   BUBBA BHATT TREND   BUBBA BHATT TREND      Results for orders placed during the hospital encounter of 02/08/17   XR chest 2 views    Narrative CHEST INDICATION:  Weakness  COMPARISON:  September 13, 2016  VIEWS:  Frontal and lateral projections; 2 images    FINDINGS:         Cardiomediastinal silhouette appears unremarkable  The lungs are clear  No pneumothorax or pleural effusion  Visualized osseous structures appear within normal limits for the patient's age  Impression Normal examination  Workstation performed: [id number]       No results found for this or any previous visit  No results found for this or any previous visit  Results for orders placed in visit on 06/10/14   CT abdomen pelvis wo contrast    Narrative CT ABDOMEN AND PELVIS WITHOUT IV CONTRAST   INDICATION-  Epigastric pain  COMPARISON- 5/11/2014  TECHNIQUE-  CT examination of the abdomen and pelvis was performed   without intravenous contrast   Examination was performed utilizing   techniques to minimize radiation dose, including the use of dose   reduction software  Enteric contrast was given  FINDINGS-   ABDOMEN   LOWER CHEST-  Mild dependent changes are seen at the lung bases  Small   sliding-type hiatal hernia  LIVER/BILIARY TREE- Unremarkable  GALLBLADDER-  Surgically absent  SPLEEN-  Unremarkable  PANCREAS-  Atrophic  Right lower quadrant suspected transplanted   pancreas again noted, unchanged  ADRENAL GLANDS- Unremarkable  KIDNEYS/URETERS- Markedly atrophic kidneys as before  Left pelvic renal   transplant identified  STOMACH AND BOWEL- Large number of pills noted within the stomach  At   least 13 are counted  Enteric contrast material reaches the sigmoid   colon  No gross evidence of bowel obstruction or ileus  No free air  APPENDIX- No findings to suggest appendicitis  ABDOMINOPELVIC CAVITY- No abdominal pelvic ascites is noted  No   lymphadenopathy is seen  No free intraperitoneal air is present  VESSELS-  Unremarkable for patient's age  PELVIS   REPRODUCTIVE ORGANS-  Unremarkable for patient's age     URINARY BLADDER- Stable right bladder diverticulum with calculus noted  ABDOMINAL WALL/INGUINAL REGIONS- Unremarkable  OSSEOUS STRUCTURES- No acute fracture or destructive osseous lesion  IMPRESSION-   1  No acute findings in the abdomen or pelvis  Marked number of pills   noted within the stomach however  Correlate clinically  2  Markedly atrophic native kidneys with a left pelvic renal transplant   again demonstrated  3  Stable presumptive right lower quadrant pancreatic transplant  Transcribed on- [id number]           - DIVYA Otero MD        [place] Radiologist- DIVYA Otero MD        Releasing Radiologist- DIVYA Otero MD        Released Date Time- 06/10/14 1029      ------------------------------------------------------------------------------   Palak GIRON      No results found for this or any previous visit  Portions of the record may have been created with voice recognition software  Occasional wrong word or "sound a like" substitutions may have occurred due to the inherent limitations of voice recognition software  Read the chart carefully and recognize, using context, where substitutions have occurred

## 2017-02-09 NOTE — CASE MANAGEMENT
Patient: Stephy Kennedy  :  1965  MRN:  [id number]  [facility]:  [id number]   Admission Date/Time:  17  AT  1402   Admitting Diagnosis:  UTI (urinary tract infection) [N39 0]  ARF (acute renal failure) [N17 9]  Weakness [R53 1]  H/O kidney transplant [Z94 0]  H/O pancreas transplant [Z94 83]    Orders Placed This Encounter   Procedures    Place in Observation (expected length of stay for this patient is less than two midnights)     Standing Status:   Standing     Number of Occurrences:   1     Order Specific Question:   Admitting Physician     Answer:   Alison Cano [[id number]]     Order Specific Question:   Level of Care     Answer:   Med Surg [16]    Inpatient Admission     Standing Status:   Standing     Number of Occurrences:   1     Order Specific Question:   Admitting Physician     Answer:   Lissett Gary     Order Specific Question:   Level of Care     Answer:   Med Surg [16]     Order Specific Question:   Estimated length of stay     Answer:   More than 2 Midnights     Order Specific Question:   Certification     Answer:   I certify that inpatient services are medically necessary for this patient for a duration of greater than two midnights  See H&P and MD Progress Notes for additional information about the patient's course of treatment  Mode of Arrival:  EMERGENT WALK-IN TO [facility] ED 17 AT 0719    Chief Complaint   Patient presents with    Weakness - Generalized     pt with increased weakness all week  Chief Complaint: Generalized fatigue and weakness     History of Present Illness:   Stephy Kennedy is a 45 y o  female who presents with one week history of increasing generalized fatigue and weakness  Patient states she's had some difficulty urinating, dysuria, and suprapubic pain  Admits to shaking chills at times  Patient states she has had urinary tract infections in the past        Review of Systems:   Constitutional: Positive for chills and fatigue   Negative for activity change, appetite change, diaphoresis and fever  Respiratory: Negative for cough, chest tightness, shortness of breath and wheezing  Cardiovascular: Negative for chest pain, palpitations and leg swelling  Gastrointestinal: Negative for abdominal distention, abdominal pain, anal bleeding, blood in stool, constipation, diarrhea, nausea and vomiting  Genitourinary: Positive for difficulty urinating, dysuria and frequency  Negative for flank pain, hematuria and urgency  Musculoskeletal: Negative for back pain and gait problem  Skin: Negative for rash and wound  Neurological: Negative for dizziness, tremors, seizures, syncope, facial asymmetry, speech difficulty, weakness, light-headedness, numbness and headaches  Psychiatric/Behavioral: Negative for agitation, confusion and hallucinations  The patient is not nervous/anxious       Physical Exam:   Constitutional: She is oriented to person, place, and time  She appears well-developed and well-nourished  No distress  Cardiovascular: Normal rate and regular rhythm  Exam reveals no friction rub  No murmur heard  Pulmonary/Chest: Effort normal and breath sounds normal  No respiratory distress  She has no wheezes  Abdominal: Soft  Bowel sounds are normal  She exhibits no distension  There is tenderness (suprapubic tenderness to palpation)  There is no rebound and no guarding  Musculoskeletal: She exhibits no edema  Neurological: She is alert and oriented to person, place, and time  No cranial nerve deficit         Triage Vitals:   ED Triage Vitals   Temperature Pulse Respirations Blood Pressure SpO2   02/08/17 0722 02/08/17 0722 02/08/17 0722 02/08/17 0722 02/08/17 0722   97 3 °F (36 3 °C) 76 16 160/67 98 %      Temp Source Heart Rate Source Patient Position BP Location FiO2 (%)   02/08/17 0722 02/08/17 0800 02/08/17 0800 02/08/17 0800 --   Tympanic Monitor Lying Right arm       Pain Score       02/08/17 0722       9         Most recent vitals: Vitals:    02/08/17 1720 02/08/17 2113 02/08/17 2300 02/09/17 0730   BP: 162/75 139/63 127/58 165/71   Pulse: 77  71 80   Resp: 18  18 18   Temp: 98 2 °F (36 8 °C)  98 2 °F (36 8 °C) 98 2 °F (36 8 °C)   TempSrc: Oral  Oral Oral   SpO2: 94%  94% 94%   Weight: 94 8 kg (209 lb)      Height: 5' 7" (1 702 m)          LAB  Results from last 7 days   CBC  Lab Units 02/08/17  0829   WBC Thousand/uL 10 67*   HEMOGLOBIN g/dL 13 8   HEMATOCRIT % 40 3   PLATELETS Thousands/uL 223   NEUTROS PCT % 73   LYMPHS PCT % 17   MONOS PCT % 7   EOS PCT   % 3     CHEMISTRY  Lab Units 02/08/17  0829 02/08/17  0644   SODIUM mmol/L 128* 129*   POTASSIUM mmol/L 4 5 4 4   CHLORIDE mmol/L 103 103   CO2 mmol/L 13* 14*   BUN mg/dL 65* 64*   CREATININE mg/dL 2 67* 2 70*   CALCIUM mg/dL 9 5 9 4   TOTAL PROTEIN g/dL --  9 0*   BILIRUBIN TOTAL mg/dL --  0 37   ALK PHOS U/L --  141*   ALT U/L --  31   AST U/L --  20   GLUCOSE RANDOM mg/dL 125 143*        ED Urine Macroscopic [[id number]] (Abnormal) Collected: 02/08/17 1048       Lab Status: Final result Specimen: Urine Updated: 02/08/17 1042        Color, UA Yellow        Clarity, UA Clear        pH, UA 7 0 4 5 - 8 0         Leukocytes, UA Large (A) Negative         Nitrite, UA Positive (A) Negative         Protein, UA Trace (A) Negative mg/dl         Glucose, UA Negative Negative mg/dl         Ketones, UA Negative Negative mg/dl         Urobilinogen, UA 0 2 0 2, 1 0 E U /dl E U /dl         Bilirubin, UA Negative Negative         Blood, UA Negative Negative         Specific [place], UA 1 010 1 003 - 1 030        Narrative:         CLINITEK RESULT       Urine Microscopic [[id number]] (Abnormal) Collected: 02/08/17 1048       Lab Status: Final result Specimen: Urine Updated: 02/08/17 1130        RBC, UA None Seen None Seen /hpf         WBC, UA 30-50 (A) None Seen /hpf         Epithelial Cells None Seen None Seen, Occasional /hpf         Bacteria, UA Innumerable (A) None Seen, Occasional /hpf         OTHER OBSERVATIONS WBCs Clumped        MUCOUS THREADS Occasional Occasional, Moderate, Innumerable       Microbiology Results (last 21 days)        Procedure Component Value - Date/Time       Blood culture [[id number]] Collected: 02/08/17 1825       Lab Status: In process Specimen: Blood from Hand, Right Updated: 02/08/17 1839       Blood culture [[id number]] Collected: 02/08/17 1824       Lab Status: In process Specimen: Blood from Hand, Left Updated: 02/08/17 1839       Urine culture [[id number]] Collected: 02/08/17 1048       Lab Status: Preliminary result Specimen: Urine Updated: 02/09/17 0857        Urine Culture >100,000 cfu/ml Gram Negative Jerald resembling Escherichia coli        CHEST X RAY -  Normal examination    RENAL ULTRASOUND -  Left transplant kidney demonstrates no evidence of hydronephrosis   However, there is mildly abnormal arterial waveforms in the transient kidney, which suggests renal artery stenosis  Bilateral atrophic native kidneys  Past Medical History   Diagnosis Date    Anemia     Diabetes mellitus      Previous, controlled with diet    Disease of thyroid gland     History of transfusion     Hypertension     Night blindness     Psychiatric disorder     Renal disorder     Retinopathy     Status post simultaneous kidney and pancreas transplant     Toe amputation status        Past Surgical History   Procedure Laterality Date    Nephrectomy transplanted organ      Hallux valgus correction Right     Combined kidney-pancreas transplant N/A     Foot amputation through metatarsal Left     Eye surgery       cataracts    Cystoscopy w/ retrogrades N/A 10/13/2016     Procedure: CYSTOSCOPY, retrograde pyelogram, biopsy of ureteral polyp;   Surgeon: Patrick Sevilla MD;  Location: [facility];  Service:        ED Medication Orders (From 02/07/17 2359 through 02/08/17 1711) Hide     Start Ordered       Status Ordering Provider     02/08/17 1630 02/08/17 1448  sodium bicarbonate 150 mEq in dextrose 5 % 1,000 mL infusion Continuous      Last MAR action: Stopped BRITTNY Thornton     02/08/17 1130 02/08/17 1118   Once, Status: Discontinued      Discontinued Sharlon Pouch     02/08/17 1130 02/08/17 1126  cefTRIAXone (ROCEPHIN) 2,000 mg in dextrose 5 % 50 mL IVPB Once      Last MAR action: Given Sharlon Pouch     02/08/17 0945 02/08/17 0941  ondansetron (ZOFRAN) injection 4 mg Once      Last MAR action: Given CHINYERE CODY     02/08/17 0815 02/08/17 0811  sodium chloride 0 9 % bolus 1,000 mL Once      Last MAR action: Stopped CHINYERE CODY       Assessment/Plan:   Hospital Problem List:      Principal Problem:  UTI (urinary tract infection)    Active Problems:  ARF (acute renal failure)  Essential hypertension  Status post simultaneous kidney and pancreas transplant        Plan for the Primary Problem(s):  · Suspected UTI  ¨ Continue IV ceftriaxone  ¨ Follow-up final urine culture  ¨ Check blood cultures     Plan for Additional Problems:   · EDUARDO/CKD3 with kidney pancreas transplant - baseline appears around 1 9  Hold Lasix  IV fluids  Nephrology consult  · Hypertension-monitor  · Anxiety/Depression - continue home meds  · Chronic constipation - lactulose  · Patient did not bring med list from home, her med list was confirmed/adjusted based on [employer]      VTE Prophylaxis: Heparin / sequential compression device   Code Status: Full code  POLST: POLST is not applicable to this patient     Anticipated Length of Stay: Patient will be admitted on an Observation basis with an anticipated length of stay of Greater than 2 midnights  Justification for Hospital Stay: IV antibioitcs and culture f/u for UTI   IVF for EDUARDO      ADMISSION ORDERS -  2/8/17 AT 1402  ADMIT INPATIENT  VITAL SIGNS Q4HRS    Up as Tolerated  SCD    Regular House Diet    Continuous IV Infusions:  IVF sodium chloride 0 9 % infusion at 75 cc/hr    IV infusion builder  Last Rate: Stopped (02/08/17 1813)       Scheduled Meds:  amLODIPine 5 mg Oral BID   ARIPiprazole 30 mg Oral HS   aspirin 81 mg Oral Daily   cefTRIAXone 1,000 mg Intravenous Q24H   cholecalciferol 3,000 Units Oral Daily   cloNIDine 0 2 mg Oral Daily With Lunch   cloNIDine 0 3 mg Oral BID   doxazosin 1 mg Oral HS   DULoxetine 60 mg Oral BID   folic acid 1 mg Oral Daily   heparin (porcine) 5,000 Units Subcutaneous Q8H [facility]   hydrALAZINE 50 mg Oral TID   lactulose 30 g Oral Daily   levothyroxine 88 mcg Oral Early Morning   metoprolol tartrate 50 mg Oral BID   pantoprazole 40 mg Oral Early Morning   pravastatin 80 mg Oral Daily With Dinner   predniSONE 2 5 mg Oral Daily   rOPINIRole 0 25 mg Oral BID   sertraline 200 mg Oral Daily   sodium bicarbonate 650 mg Oral Daily   tacrolimus 3 mg Oral After Dinner   tacrolimus 4 mg Oral Daily   traZODone 150 mg Oral HS       PRN Meds:     acetaminophen    LORazepam    ondansetron    Consult Renal      Nephrology Consults Date of Service: 2/8/2017  2:30 PM     ASSESSMENT and PLAN:  1  EDUARDO: likely prerenal from poor po intake and diuretics +/- ATN from infection   -UA: trace protein, large leuks, +nitrites, 30-50 WBC, innumerable bacteria   -renal ultrasound pending  -will hold lasix and give IVF  -change fluids to bicarb drip because bicarb only 13   -check am BMP   2  CKD IV: baseline creatinine around 1 9 and follows with Dr Meli Elam  3  S/p renal and pancreas transplant: continue prednisone 7 5mg daily and tacrolimus 4mg in am and 3mg in pm   4  Hyponatremia: likely due to hypovolemia and will trend on normal saline   -check am BMP   5  Non-anion gap metabolic acidosis: start D2U with 150meq sodium bicarb at 75cc/h  6  Hypertension: uncontrolled but did not have meds yet today so would restart home meds   7   UTI: culture pending and is getting a dose of ceftriaxone now

## 2017-02-10 ENCOUNTER — GENERIC CONVERSION - ENCOUNTER (OUTPATIENT)
Dept: OTHER | Facility: OTHER | Age: 52
End: 2017-02-10

## 2017-02-10 LAB
ANION GAP SERPL CALCULATED.3IONS-SCNC: 10 MMOL/L (ref 4–13)
BACTERIA UR CULT: NORMAL
BUN SERPL-MCNC: 41 MG/DL (ref 5–25)
CALCIUM SERPL-MCNC: 8.7 MG/DL (ref 8.3–10.1)
CHLORIDE SERPL-SCNC: 112 MMOL/L (ref 100–108)
CO2 SERPL-SCNC: 19 MMOL/L (ref 21–32)
CREAT SERPL-MCNC: 1.91 MG/DL (ref 0.6–1.3)
GFR SERPL CREATININE-BSD FRML MDRD: 27.6 ML/MIN/1.73SQ M
GLUCOSE SERPL-MCNC: 145 MG/DL (ref 65–140)
POTASSIUM SERPL-SCNC: 4 MMOL/L (ref 3.5–5.3)
SODIUM SERPL-SCNC: 141 MMOL/L (ref 136–145)
TACROLIMUS BLD LC/MS/MS-MCNC: 9.7 NG/ML (ref 2–20)

## 2017-02-10 PROCEDURE — 80048 BASIC METABOLIC PNL TOTAL CA: CPT | Performed by: INTERNAL MEDICINE

## 2017-02-10 PROCEDURE — 80197 ASSAY OF TACROLIMUS: CPT | Performed by: INTERNAL MEDICINE

## 2017-02-10 RX ORDER — SODIUM BICARBONATE 650 MG/1
650 TABLET ORAL
Status: DISCONTINUED | OUTPATIENT
Start: 2017-02-10 | End: 2017-02-11 | Stop reason: HOSPADM

## 2017-02-10 RX ADMIN — DOXAZOSIN MESYLATE 1 MG: 1 TABLET ORAL at 22:10

## 2017-02-10 RX ADMIN — HYDRALAZINE HYDROCHLORIDE 50 MG: 50 TABLET ORAL at 09:45

## 2017-02-10 RX ADMIN — LEVOTHYROXINE SODIUM 88 MCG: 88 TABLET ORAL at 06:20

## 2017-02-10 RX ADMIN — FOLIC ACID 1 MG: 1 TABLET ORAL at 09:46

## 2017-02-10 RX ADMIN — PREDNISONE 2.5 MG: 2.5 TABLET ORAL at 09:47

## 2017-02-10 RX ADMIN — METOPROLOL TARTRATE 50 MG: 50 TABLET ORAL at 09:45

## 2017-02-10 RX ADMIN — ARIPIPRAZOLE 30 MG: 15 TABLET ORAL at 22:10

## 2017-02-10 RX ADMIN — SODIUM BICARBONATE 650 MG TABLET 650 MG: at 18:29

## 2017-02-10 RX ADMIN — HEPARIN SODIUM 5000 UNITS: 5000 INJECTION, SOLUTION INTRAVENOUS; SUBCUTANEOUS at 06:20

## 2017-02-10 RX ADMIN — CLONIDINE HYDROCHLORIDE 0.2 MG: 0.1 TABLET ORAL at 13:05

## 2017-02-10 RX ADMIN — HYDRALAZINE HYDROCHLORIDE 50 MG: 50 TABLET ORAL at 16:23

## 2017-02-10 RX ADMIN — METOPROLOL TARTRATE 50 MG: 50 TABLET ORAL at 18:30

## 2017-02-10 RX ADMIN — CLONIDINE HYDROCHLORIDE 0.3 MG: 0.1 TABLET ORAL at 09:46

## 2017-02-10 RX ADMIN — HEPARIN SODIUM 5000 UNITS: 5000 INJECTION, SOLUTION INTRAVENOUS; SUBCUTANEOUS at 13:18

## 2017-02-10 RX ADMIN — VITAMIN D, TAB 1000IU (100/BT) 3000 UNITS: 25 TAB at 09:45

## 2017-02-10 RX ADMIN — ROPINIROLE 0.25 MG: 0.25 TABLET, FILM COATED ORAL at 09:47

## 2017-02-10 RX ADMIN — TACROLIMUS 3 MG: 1 CAPSULE ORAL at 18:29

## 2017-02-10 RX ADMIN — AMLODIPINE BESYLATE 5 MG: 5 TABLET ORAL at 09:45

## 2017-02-10 RX ADMIN — ASPIRIN 81 MG 81 MG: 81 TABLET ORAL at 09:46

## 2017-02-10 RX ADMIN — ROPINIROLE 0.25 MG: 0.25 TABLET, FILM COATED ORAL at 18:33

## 2017-02-10 RX ADMIN — PANTOPRAZOLE SODIUM 40 MG: 40 TABLET, DELAYED RELEASE ORAL at 06:20

## 2017-02-10 RX ADMIN — PRAVASTATIN SODIUM 80 MG: 80 TABLET ORAL at 16:23

## 2017-02-10 RX ADMIN — CLONIDINE HYDROCHLORIDE 0.3 MG: 0.1 TABLET ORAL at 18:31

## 2017-02-10 RX ADMIN — SERTRALINE HYDROCHLORIDE 200 MG: 100 TABLET, FILM COATED ORAL at 09:45

## 2017-02-10 RX ADMIN — AMLODIPINE BESYLATE 5 MG: 5 TABLET ORAL at 18:32

## 2017-02-10 RX ADMIN — DULOXETINE HYDROCHLORIDE 60 MG: 60 CAPSULE, DELAYED RELEASE ORAL at 09:46

## 2017-02-10 RX ADMIN — CEFTRIAXONE 1000 MG: 1 INJECTION, POWDER, FOR SOLUTION INTRAMUSCULAR; INTRAVENOUS at 13:05

## 2017-02-10 RX ADMIN — HYDRALAZINE HYDROCHLORIDE 50 MG: 50 TABLET ORAL at 21:59

## 2017-02-10 RX ADMIN — DULOXETINE HYDROCHLORIDE 60 MG: 60 CAPSULE, DELAYED RELEASE ORAL at 18:31

## 2017-02-10 RX ADMIN — HEPARIN SODIUM 5000 UNITS: 5000 INJECTION, SOLUTION INTRAVENOUS; SUBCUTANEOUS at 22:08

## 2017-02-10 RX ADMIN — TACROLIMUS 4 MG: 1 CAPSULE ORAL at 09:55

## 2017-02-10 NOTE — PROGRESS NOTES
Progress Note - Shabbir Cabral 45 y o  female MRN: [id number]    Unit/Bed#: [facility] 813-01 Encounter: [id number]        * UTI (urinary tract infection)  Assessment & Plan     Assessment: Preliminary urine culture positive E coli  On Rocephin day 3  Plan:   · Continue Rocephin pending final cultures/sensitivies  · Hemodynamically stable  ARF (acute renal failure)  Assessment & Plan     Assessment: EDUARDO on CKD Stage IV (Baseline Cr 1 9) Creatinine back to baseline today after IV fluid resuscitation  Suspect prerenal vs UTI  S/p kidney pancreas transplant [facility] yrs ago at [facility]  Plan:   · Nephrology following  · Will d/c IVFs  · US of kidneys with evidence of possible WILIAN  · Renal artery doppler with no evidence of significant arterial occlusive disease in main renal artery, but with abnormal parenchymal flow with renovascular RI 0 88  Benign hypertension with CKD (chronic kidney disease) stage IV  Assessment & Plan     Assessment: Labile BPs  Plan:   · Per nephrology, avoid hypotension  Goal -150s for now  · Continue home medication regimen    Status post simultaneous kidney and pancreas transplant  Assessment & Plan     Assessment: Approx 20 yrs ago at [facility], [address]:   · Continue immunosuppression with Prograf & prednisone  · Prograf level pending  VTE Pharmacologic Prophylaxis:   Pharmacologic: Heparin  Mechanical VTE Prophylaxis in Place: No    Patient Centered Rounds: Discussed with RN  Rounds with nursing student  Discussions with Specialists or Other Care Team Provider: None    Education and Discussions with Family / Patient: Plan discussed with patient  Time Spent for Care: 20 minutes  More than 50% of total time spent on counseling and coordination of care as described above      Current Length of Stay: 2 day(s)    Current Patient Status: Inpatient   Certification Statement: The patient will continue to require additional inpatient hospital stay due to close renal monitoring off IV fluids, awaiting final culture results  Discharge Plan: Anticipate discharge possibly tomorrow pending nephrology recommendations  Code Status: Level 1 - Full Code      Subjective:   Patient feeling well today  States she feels "dry" in her skin and mouth  Reports she is eating and drinking okay  No suprapubic discomfort, dysuria, flank pain, N/V, abdominal pain, constipation, diarrhea, fever, chills  Did have some diarrhea yesterday after being given lactulose, which she states she would not take today  No diarrhea today  Objective:     Vitals:   Temp (24hrs), Av 8 °F (36 6 °C), Min:97 7 °F (36 5 °C), Max:97 8 °F (36 6 °C)    HR:  [65-75] 65  Resp:  [18] 18  BP: (120-159)/(58-70) 159/68  SpO2:  [95 %-96 %] 95 %  Body mass index is 32 73 kg/(m^2)  Input and Output Summary (last 24 hours): Intake/Output Summary (Last 24 hours) at 02/10/17 1227  Last data filed at 02/10/17 0900   Gross per 24 hour   Intake           2277 5 ml   Output              700 ml   Net           1577 5 ml       Physical Exam:     Physical Exam   Constitutional: She is oriented to person, place, and time  She appears well-developed and well-nourished  No distress  HENT:   Head: Normocephalic and atraumatic  Eyes: EOM are normal  No scleral icterus  Neck: Normal range of motion  Neck supple  Cardiovascular: Normal rate, regular rhythm and normal heart sounds  Pulmonary/Chest: Effort normal and breath sounds normal  No respiratory distress  She has no wheezes  She has no rales  Abdominal: Soft  Bowel sounds are normal  She exhibits no distension  There is no tenderness  There is no rebound and no guarding  Genitourinary:   Genitourinary Comments: No CVA tenderness  Musculoskeletal: She exhibits no edema  Neurological: She is alert and oriented to person, place, and time  Skin: Skin is warm and dry  She is not diaphoretic  Psychiatric: She has a normal mood and affect   Her behavior is normal  Thought content normal        Additional Data:     Labs:      Results from last 7 days  Lab Units 02/09/17  0651  02/08/17  0829   WBC Thousand/uL 6 82  --  10 67*   HEMOGLOBIN g/dL 12 7  --  13 8   HEMATOCRIT % 38 6  --  40 3   PLATELETS Thousands/uL 211  < > 223   NEUTROS PCT %  --   --  73   LYMPHS PCT %  --   --  17   MONOS PCT %  --   --  7   EOS PCT %  --   --  3   < > = values in this interval not displayed  Results from last 7 days  Lab Units 02/10/17  0833  02/08/17  0644   SODIUM mmol/L 141  < > 129*   POTASSIUM mmol/L 4 0  < > 4 4   CHLORIDE mmol/L 112*  < > 103   CO2 mmol/L 19*  < > 14*   BUN mg/dL 41*  < > 64*   CREATININE mg/dL 1 91*  < > 2 70*   CALCIUM mg/dL 8 7  < > 9 4   TOTAL PROTEIN g/dL  --   --  9 0*   BILIRUBIN TOTAL mg/dL  --   --  0 37   ALK PHOS U/L  --   --  141*   ALT U/L  --   --  31   AST U/L  --   --  20   GLUCOSE RANDOM mg/dL 145*  < > 143*   < > = values in this interval not displayed  * I Have Reviewed All Lab Data Listed Above  * Additional Pertinent Lab Tests Reviewed: All Labs Within Last 24 Hours Reviewed    Imaging:    Imaging Reports Reviewed Today Include: VAS renal  Imaging Personally Reviewed by Myself Includes:  None    Recent Cultures (last 7 days):       Results from last 7 days  Lab Units 02/08/17  1825 02/08/17  1824 02/08/17  1048   BLOOD CULTURE  No Growth at 24 hrs   No Growth at 24 hrs   --    URINE CULTURE   --   --  >100,000 cfu/ml Gram Negative Jerald resembling Escherichia coli       Last 24 Hours Medication List:     amLODIPine 5 mg Oral BID   ARIPiprazole 30 mg Oral HS   aspirin 81 mg Oral Daily   cefTRIAXone 1,000 mg Intravenous Q24H   cholecalciferol 3,000 Units Oral Daily   cloNIDine 0 2 mg Oral Daily With Lunch   cloNIDine 0 3 mg Oral BID   doxazosin 1 mg Oral HS   DULoxetine 60 mg Oral BID   folic acid 1 mg Oral Daily   heparin (porcine) 5,000 Units Subcutaneous Q8H [facility]   hydrALAZINE 50 mg Oral TID   lactulose 30 g Oral Daily   levothyroxine 88 mcg Oral Early Morning   metoprolol tartrate 50 mg Oral BID   pantoprazole 40 mg Oral Early Morning   pravastatin 80 mg Oral Daily With Dinner   predniSONE 2 5 mg Oral Daily   rOPINIRole 0 25 mg Oral BID   sertraline 200 mg Oral Daily   sodium bicarbonate 650 mg Oral BID after meals   tacrolimus 3 mg Oral After Dinner   tacrolimus 4 mg Oral Daily   traZODone 150 mg Oral HS        Today, Patient Was Seen By: El Dyer PA-C    ** Please Note: Dragon 360 Dictation voice to text software may have been used in the creation of this document   **

## 2017-02-10 NOTE — PROGRESS NOTES
NEPHROLOGY PROGRESS NOTE   Brodie Lima 45 y o  female MRN: [id number]  Unit/Bed#: [facility] 813-01 Encounter: [id number]  Reason for Consult: EDUARDO/CKd    ASSESSMENT AND PLAN:  Patient is 70-year-old female status post kidney pancreas transplant about 20 years ago, chronic allograft dysfunction with CK D, baseline creatinine around 1 9, came with generalized weakness, very poor by mouth intake, dysuria and found to have AK I on CK D       Nonoliguric EDUARDO on CK D stage IV with baseline creatinine 1 9 (follows with Dr Addis Diaz)  - Creatinine slowly improving to 1 9 with IV fluids back to her baseline  Peak creatinine level 2 7 on admission    - EDUARDO likely prerenal in etiology + UTI  - Urinalysis showed no hematuria, trace proteinuria, positive nitrate, 30-50 WBC, innumerable bacteria  - Discontinue IV fluid after current bag is over       Low bicarbonate, likely secondary to metabolic acidosis due to EDUARDO on CK D   - D/C IVF as above, increase PO sodium bicarbonate to BID dosing     - Bicarbonate slowly improving      Hypertension, labile with occasional higher readings  Continue to closely monitor on current antihypertensive regimen  Avoid low BP  Goal SBP 140s to 150s for now      Status post kidney pancreas transplant about 20 years ago as per patient  (at [facility])  - Patient has 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   - Transplant kidney ultrasound shows no evidence of hydronephrosis  Mildly abnormal arterial waveform in the transplant kidney suggestive of renal artery stenosis  RI 0 7-0 8; Renal artery doppler to follow       Immunosuppression with Prograf and prednisone   - Prograf level to follow       Ecoli UTI, patient initially had dysuria although her urinary symptoms are much better  - Awaiting final culture results with sensitivity  Currently on IV Rocephin as per primary care  SUBJECTIVE:  Patient seen and examined at bedside   No chest pain, shortness of breath, nausea, vomiting, abdominal pain or diarrhea  No urinary complaints       OBJECTIVE:  Current Weight: Weight - Scale: 94 8 kg (209 lb)  Vitals:    02/10/17 0715   BP: 159/68   Pulse: 65   Resp: 18   Temp: 97 8 °F (36 6 °C)   SpO2: 95%       Intake/Output Summary (Last 24 hours) at 02/10/17 0853  Last data filed at 02/10/17 4384   Gross per 24 hour   Intake           2037 5 ml   Output              700 ml   Net           1337 5 ml     Physical Examination:  General: no acute distress   HEENT: PERRLA, EOMI  Neck: supple  Respiratory:  B/l air entry present, b/l CTA  CVS: s1, s2 present  GI: soft, NT, Nd, Bs+nt  CNS: AAOX3  Extremities: no significant edema in LE  Skin: unremarkable    Medications:    Current Facility-Administered Medications:     acetaminophen (TYLENOL) tablet 650 mg, 650 mg, Oral, Q6H PRN, Lupe Gonzalez PA-C    amLODIPine (NORVASC) tablet 5 mg, 5 mg, Oral, BID, Lupe Gonzalez PA-C, 5 mg at 02/09/17 1821    ARIPiprazole (ABILIFY) tablet 30 mg, 30 mg, Oral, HS, Lupe Gonzalez PA-C, 30 mg at 02/09/17 2141    aspirin chewable tablet 81 mg, 81 mg, Oral, Daily, Lupe Gonzalez PA-C, 81 mg at 02/09/17 0806    cefTRIAXone (ROCEPHIN) 1,000 mg in dextrose 5 % 50 mL IVPB, 1,000 mg, Intravenous, Q24H, Lupe Gonzalez PA-C, Stopped at 02/09/17 1433    cholecalciferol (VITAMIN D3) tablet 3,000 Units, 3,000 Units, Oral, Daily, Perfecto Acevedo PA-C, 3,000 Units at 02/09/17 0807    cloNIDine (CATAPRES) tablet 0 2 mg, 0 2 mg, Oral, Daily With Lunch, Lupe Gonzalez PA-C, 0 2 mg at 02/09/17 1243    cloNIDine (CATAPRES) tablet 0 3 mg, 0 3 mg, Oral, BID, Lupe Gonzalez PA-C, 0 3 mg at 02/09/17 1821    doxazosin (CARDURA) tablet 1 mg, 1 mg, Oral, HS, Lupe Gonzalez PA-C, 1 mg at 02/09/17 2141    DULoxetine (CYMBALTA) delayed release capsule 60 mg, 60 mg, Oral, BID, Lupe Gonzalez PA-C, 60 mg at 00/52/79 7905    folic acid (FOLVITE) tablet 1 mg, 1 mg, Oral, Daily, Lupe Gonzalez PA-C, 1 mg at 02/09/17 0807    heparin (porcine) subcutaneous injection 5,000 Units, 5,000 Units, Subcutaneous, Q8H [facility], 5,000 Units at 02/10/17 0620 **AND** Platelet count, , , Once, Lupe Gonzalez PA-C    hydrALAZINE (APRESOLINE) tablet 50 mg, 50 mg, Oral, TID, Payam Barriga PA-C, 50 mg at 02/09/17 2141    lactulose 20 g/30 mL oral solution 30 g, 30 g, Oral, Daily, Lupe Gonzalez PA-C, 30 g at 02/09/17 0808    levothyroxine tablet 88 mcg, 88 mcg, Oral, Early Morning, Lupe Gonzalez PA-C, 88 mcg at 02/10/17 0620    LORazepam (ATIVAN) tablet 2 mg, 2 mg, Oral, BID PRN, Payam Barriga PA-C    metoprolol tartrate (LOPRESSOR) tablet 50 mg, 50 mg, Oral, BID, Lupe Gonzalez PA-C, 50 mg at 02/09/17 1821    ondansetron (ZOFRAN) injection 4 mg, 4 mg, Intravenous, Q6H PRN, Lupe Gonzalez PA-C    pantoprazole (PROTONIX) EC tablet 40 mg, 40 mg, Oral, Early Morning, Lupe Gonzalez PA-C, 40 mg at 02/10/17 0620    pravastatin (PRAVACHOL) tablet 80 mg, 80 mg, Oral, Daily With Dinner, Lupe Gonzalez PA-C, 80 mg at 02/09/17 1602    predniSONE tablet 2 5 mg, 2 5 mg, Oral, Daily, Lupe Gonzalez PA-C, 2 5 mg at 02/09/17 0809    rOPINIRole (REQUIP) tablet 0 25 mg, 0 25 mg, Oral, BID, Lupe Gonzalez PA-C, 0 25 mg at 02/09/17 1822    sertraline (ZOLOFT) tablet 200 mg, 200 mg, Oral, Daily, Lupe Gonzalez PA-C, 200 mg at 02/09/17 0807    sodium bicarbonate 150 mEq in dextrose 5 % 1,000 mL infusion, , Intravenous, Continuous, Max Rich MD, Last Rate: 75 mL/hr at 02/09/17 1820    sodium bicarbonate tablet 650 mg, 650 mg, Oral, Daily, Lupe Gonzalez PA-C, 650 mg at 02/09/17 0807    tacrolimus (PROGRAF) capsule 3 mg, 3 mg, Oral, After Dinner, Payam Barriga PA-C, 3 mg at 02/09/17 1821    tacrolimus (PROGRAF) capsule 4 mg, 4 mg, Oral, Daily, Payam Barriga PA-C, 4 mg at 02/09/17 0807    traZODone (DESYREL) tablet 150 mg, 150 mg, Oral, HS, Lupe Gonzalez PA-C, 150 mg at 02/09/17 2141    Laboratory Results:    Results from last 7 days  Lab Units 02/09/17  0651 02/08/17  1824 02/08/17  8869 02/08/17  0644   WBC Thousand/uL 6 82  --  10 67* 10 05   HEMOGLOBIN g/dL 12 7  --  13 8 13 9   HEMATOCRIT % 38 6  --  40 3 41 6   PLATELETS Thousands/uL 211 218 223 236   SODIUM mmol/L 138  --  128* 129*   POTASSIUM mmol/L 4 0  --  4 5 4 4   CHLORIDE mmol/L 113*  --  103 103   CO2 mmol/L 16*  --  13* 14*   BUN mg/dL 54*  --  65* 64*   CREATININE mg/dL 2 21*  --  2 67* 2 70*   CALCIUM mg/dL 9 2  --  9 5 9 4   MAGNESIUM mg/dL  --   --   --  2 5   PHOSPHORUS mg/dL  --   --   --  3 7   ALBUMIN g/dL  --   --   --  3 0*   TOTAL PROTEIN g/dL  --   --   --  9 0*   GLUCOSE RANDOM mg/dL 136  --  125 143*       Results for orders placed in visit on 04/18/14   XR chest portable    Narrative CHEST  PORTABLE   INDICATION- Central line placement    COMPARISON-  July 16, 2011   VIEWS-   AP frontal    1 image   FINDINGS-   The cardiomediastinal silhouette is normal   Right IJ central venous   catheter is visible with the tip in the superior vena cava just above   the cavoatrial junction  The lungs are clear  No pleural effusion  No visible pneumothorax  Pulmonary vasculature is normal caliber  Bony thorax unremarkable  IMPRESSION-   Tip of right IJ catheter is in the superior vena cava just above the   cavoatrial junction  No visible pneumothorax  Clear lungs   Transcribed on- [id number]           - DIVYA Lauren MD        Reading RadiologistDIVYA Fung MD        Releasing Radiologist- DIVYA Lauren MD        Released Date Time- 04/18/14 1640      ------------------------------------------------------------------------------   BUBBA BHATT TREND   BUBBA BHATT TREND      Results for orders placed during the hospital encounter of 02/08/17   XR chest 2 views    Narrative CHEST     INDICATION:  Weakness  COMPARISON:  September 13, 2016      VIEWS:  Frontal and lateral projections; 2 images    FINDINGS:         Cardiomediastinal silhouette appears unremarkable  The lungs are clear  No pneumothorax or pleural effusion  Visualized osseous structures appear within normal limits for the patient's age  Impression Normal examination  Workstation performed: [id number]       No results found for this or any previous visit  No results found for this or any previous visit  Results for orders placed in visit on 06/10/14   CT abdomen pelvis wo contrast    Narrative CT ABDOMEN AND PELVIS WITHOUT IV CONTRAST   INDICATION-  Epigastric pain  COMPARISON- 5/11/2014  TECHNIQUE-  CT examination of the abdomen and pelvis was performed   without intravenous contrast   Examination was performed utilizing   techniques to minimize radiation dose, including the use of dose   reduction software  Enteric contrast was given  FINDINGS-   ABDOMEN   LOWER CHEST-  Mild dependent changes are seen at the lung bases  Small   sliding-type hiatal hernia  LIVER/BILIARY TREE- Unremarkable  GALLBLADDER-  Surgically absent  SPLEEN-  Unremarkable  PANCREAS-  Atrophic  Right lower quadrant suspected transplanted   pancreas again noted, unchanged  ADRENAL GLANDS- Unremarkable  KIDNEYS/URETERS- Markedly atrophic kidneys as before  Left pelvic renal   transplant identified  STOMACH AND BOWEL- Large number of pills noted within the stomach  At   least 13 are counted  Enteric contrast material reaches the sigmoid   colon  No gross evidence of bowel obstruction or ileus  No free air  APPENDIX- No findings to suggest appendicitis  ABDOMINOPELVIC CAVITY- No abdominal pelvic ascites is noted  No   lymphadenopathy is seen  No free intraperitoneal air is present  VESSELS-  Unremarkable for patient's age  PELVIS   REPRODUCTIVE ORGANS-  Unremarkable for patient's age  URINARY BLADDER- Stable right bladder diverticulum with calculus noted  ABDOMINAL WALL/INGUINAL REGIONS- Unremarkable  OSSEOUS STRUCTURES- No acute fracture or destructive osseous lesion  IMPRESSION-   1  No acute findings in the abdomen or pelvis  Marked number of pills   noted within the stomach however  Correlate clinically  2  Markedly atrophic native kidneys with a left pelvic renal transplant   again demonstrated  3  Stable presumptive right lower quadrant pancreatic transplant  Transcribed on- [id number]           Fara Diaz, DIVYA BLANCHARD        Reading Radiologist- Salvatore Joe, DIVYA BLANCHARD        Releasing Radiologist- Salvatore Joe, DIVYA BLANCHARD        Released Date Time- 06/10/14 1029      ------------------------------------------------------------------------------   Pleasant Canary MACK      No results found for this or any previous visit  Portions of the record may have been created with voice recognition software  Occasional wrong word or "sound a like" substitutions may have occurred due to the inherent limitations of voice recognition software  Read the chart carefully and recognize, using context, where substitutions have occurred

## 2017-02-10 NOTE — SUBJECTIVE & OBJECTIVE
VTE Pharmacologic Prophylaxis:   Pharmacologic: Heparin  Mechanical VTE Prophylaxis in Place: No    Patient Centered Rounds: Discussed with RN  Rounds with nursing student  Discussions with Specialists or Other Care Team Provider: None    Education and Discussions with Family / Patient: Plan discussed with patient  Time Spent for Care: 20 minutes  More than 50% of total time spent on counseling and coordination of care as described above  Current Length of Stay: 2 day(s)    Current Patient Status: Inpatient   Certification Statement: The patient will continue to require additional inpatient hospital stay due to close renal monitoring off IV fluids, awaiting final culture results  Discharge Plan: Anticipate discharge possibly tomorrow pending nephrology recommendations  Code Status: Level 1 - Full Code      Subjective:   Patient feeling well today  States she feels "dry" in her skin and mouth  Reports she is eating and drinking okay  No suprapubic discomfort, dysuria, flank pain, N/V, abdominal pain, constipation, diarrhea, fever, chills  Did have some diarrhea yesterday after being given lactulose, which she states she would not take today  No diarrhea today  Objective:     Vitals:   Temp (24hrs), Av 8 °F (36 6 °C), Min:97 7 °F (36 5 °C), Max:97 8 °F (36 6 °C)    HR:  [65-75] 65  Resp:  [18] 18  BP: (120-159)/(58-70) 159/68  SpO2:  [95 %-96 %] 95 %  Body mass index is 32 73 kg/(m^2)  Input and Output Summary (last 24 hours): Intake/Output Summary (Last 24 hours) at 02/10/17 1227  Last data filed at 02/10/17 0900   Gross per 24 hour   Intake           2277 5 ml   Output              700 ml   Net           1577 5 ml       Physical Exam:     Physical Exam   Constitutional: She is oriented to person, place, and time  She appears well-developed and well-nourished  No distress  HENT:   Head: Normocephalic and atraumatic  Eyes: EOM are normal  No scleral icterus     Neck: Normal range of motion  Neck supple  Cardiovascular: Normal rate, regular rhythm and normal heart sounds  Pulmonary/Chest: Effort normal and breath sounds normal  No respiratory distress  She has no wheezes  She has no rales  Abdominal: Soft  Bowel sounds are normal  She exhibits no distension  There is no tenderness  There is no rebound and no guarding  Genitourinary:   Genitourinary Comments: No CVA tenderness  Musculoskeletal: She exhibits no edema  Neurological: She is alert and oriented to person, place, and time  Skin: Skin is warm and dry  She is not diaphoretic  Psychiatric: She has a normal mood and affect  Her behavior is normal  Thought content normal        Additional Data:     Labs:      Results from last 7 days  Lab Units 02/09/17  0651  02/08/17  0829   WBC Thousand/uL 6 82  --  10 67*   HEMOGLOBIN g/dL 12 7  --  13 8   HEMATOCRIT % 38 6  --  40 3   PLATELETS Thousands/uL 211  < > 223   NEUTROS PCT %  --   --  73   LYMPHS PCT %  --   --  17   MONOS PCT %  --   --  7   EOS PCT %  --   --  3   < > = values in this interval not displayed  Results from last 7 days  Lab Units 02/10/17  0833  02/08/17  0644   SODIUM mmol/L 141  < > 129*   POTASSIUM mmol/L 4 0  < > 4 4   CHLORIDE mmol/L 112*  < > 103   CO2 mmol/L 19*  < > 14*   BUN mg/dL 41*  < > 64*   CREATININE mg/dL 1 91*  < > 2 70*   CALCIUM mg/dL 8 7  < > 9 4   TOTAL PROTEIN g/dL  --   --  9 0*   BILIRUBIN TOTAL mg/dL  --   --  0 37   ALK PHOS U/L  --   --  141*   ALT U/L  --   --  31   AST U/L  --   --  20   GLUCOSE RANDOM mg/dL 145*  < > 143*   < > = values in this interval not displayed  * I Have Reviewed All Lab Data Listed Above  * Additional Pertinent Lab Tests Reviewed:  All Labs Within Last 24 Hours Reviewed    Imaging:    Imaging Reports Reviewed Today Include: VAS renal  Imaging Personally Reviewed by Myself Includes:  None    Recent Cultures (last 7 days):       Results from last 7 days  Lab Units 02/08/17  1825 02/08/17  1824 02/08/17  1048   BLOOD CULTURE  No Growth at 24 hrs  No Growth at 24 hrs   --    URINE CULTURE   --   --  >100,000 cfu/ml Gram Negative Jerald resembling Escherichia coli       Last 24 Hours Medication List:     amLODIPine 5 mg Oral BID   ARIPiprazole 30 mg Oral HS   aspirin 81 mg Oral Daily   cefTRIAXone 1,000 mg Intravenous Q24H   cholecalciferol 3,000 Units Oral Daily   cloNIDine 0 2 mg Oral Daily With Lunch   cloNIDine 0 3 mg Oral BID   doxazosin 1 mg Oral HS   DULoxetine 60 mg Oral BID   folic acid 1 mg Oral Daily   heparin (porcine) 5,000 Units Subcutaneous Q8H [address]   hydrALAZINE 50 mg Oral TID   lactulose 30 g Oral Daily   levothyroxine 88 mcg Oral Early Morning   metoprolol tartrate 50 mg Oral BID   pantoprazole 40 mg Oral Early Morning   pravastatin 80 mg Oral Daily With Dinner   predniSONE 2 5 mg Oral Daily   rOPINIRole 0 25 mg Oral BID   sertraline 200 mg Oral Daily   sodium bicarbonate 650 mg Oral BID after meals   tacrolimus 3 mg Oral After Dinner   tacrolimus 4 mg Oral Daily   traZODone 150 mg Oral HS        Today, Patient Was Seen By: Chayito Steward PA-C    ** Please Note: Dragon 360 Dictation voice to text software may have been used in the creation of this document   **

## 2017-02-10 NOTE — ASSESSMENT & PLAN NOTE
Assessment: Labile BPs  Plan:   · Per nephrology, avoid hypotension   Goal -150s for now  · Continue home medication regimen

## 2017-02-10 NOTE — ASSESSMENT & PLAN NOTE
Assessment: EDUARDO on CKD Stage IV (Baseline Cr 1 9) Creatinine back to baseline today after IV fluid resuscitation  Suspect prerenal vs UTI  S/p kidney pancreas transplant 20 yrs ago at [facility]  Plan:   · Nephrology following  · Will d/c IVFs  · US of kidneys with evidence of possible WILIAN  · Renal artery doppler with no evidence of significant arterial occlusive disease in main renal artery, but with abnormal parenchymal flow with renovascular RI 0 88

## 2017-02-10 NOTE — ASSESSMENT & PLAN NOTE
Assessment: Approx 20 yrs ago at [address]:   · Continue immunosuppression with Prograf & prednisone  · Prograf level pending

## 2017-02-10 NOTE — ASSESSMENT & PLAN NOTE
Assessment: Preliminary urine culture positive E coli  On Rocephin day 3  Plan:   · Continue Rocephin pending final cultures/sensitivies  · Hemodynamically stable

## 2017-02-10 NOTE — PHYSICIAN ADVISOR
This patient is a 45 y o  y/o female who is admitted to the hospital for Weakness - Generalized (pt with increased weakness all week )   The patient presented to the ED on 2/8/17 at 0719 and was admitted to the hospital on 2/8/2017 0724  History of Present Illness includes: 45year old woman with history of kidney-pancreas transplant who presents to the ER with complaint of generalized weakness  Vital signs in the ER are as follows   ED Triage Vitals   Temperature Pulse Respirations Blood Pressure SpO2   02/08/17 0722 02/08/17 0722 02/08/17 0722 02/08/17 0722 02/08/17 0722   97 3 °F (36 3 °C) 76 16 160/67 98 %      Temp Source Heart Rate Source Patient Position BP Location FiO2 (%)   02/08/17 0722 02/08/17 0800 02/08/17 0800 02/08/17 0800 --   Tympanic Monitor Lying Right arm       Pain Score       02/08/17 0722       9       Physical exam showed lethargy  US duplex ar/vn abd,pelvis limited   Final Result      Left transplant kidney demonstrates no evidence of hydronephrosis  However, there is mildly abnormal arterial waveforms in the transient kidney, which suggests renal artery stenosis  Bilateral atrophic native kidneys  Workstation performed: [id number]         US kidney and bladder   Final Result      Left transplant kidney demonstrates no evidence of hydronephrosis  However, there is mildly abnormal arterial waveforms in the transient kidney, which suggests renal artery stenosis  Bilateral atrophic native kidneys  Workstation performed: [id number]         XR chest 2 views   ED Interpretation   No acute pulmonary disease      Final Result      Normal examination  Workstation performed: [id number]         VAS renal artery limited    (Results Pending)     Treatment in the ER included IV antibiotics  The patient is admitted as INPATIENT  and has remained hospitalized for 1 day(s)    Last vital signs were Blood pressure 154/68, pulse 75, temperature 97 8 °F (36 6 °C), temperature source Axillary, resp  rate 18, height 5' 7" (1 702 m), weight 94 8 kg (209 lb), SpO2 95 %  and treatment includes IV antibiotics, nephrology consult and renal ultrasound  Abnormal labs include: sodium 128, creatinine 2 7, WBC 10 67 and urine culture shows >100,000 E  Coli  The patient is appropriate for  Inpatient Admission  The rationale is as follows: 45year old woman with K-P transplant now with UTI who has required more than 2 midnight stay for the evaluation and treatment of her condition  Without further hospitalization, she is at increased risk of kidney transplant loss, sepsis and hypotension

## 2017-02-11 VITALS
BODY MASS INDEX: 32.8 KG/M2 | WEIGHT: 209 LBS | DIASTOLIC BLOOD PRESSURE: 72 MMHG | HEIGHT: 67 IN | SYSTOLIC BLOOD PRESSURE: 158 MMHG | OXYGEN SATURATION: 94 % | HEART RATE: 60 BPM | RESPIRATION RATE: 18 BRPM | TEMPERATURE: 98.2 F

## 2017-02-11 PROBLEM — E87.8 LOW BICARBONATE LEVEL: Status: RESOLVED | Noted: 2017-02-09 | Resolved: 2017-02-11

## 2017-02-11 LAB
ANION GAP SERPL CALCULATED.3IONS-SCNC: 10 MMOL/L (ref 4–13)
BASOPHILS # BLD AUTO: 0.05 THOUSANDS/ΜL (ref 0–0.1)
BASOPHILS NFR BLD AUTO: 1 % (ref 0–1)
BUN SERPL-MCNC: 36 MG/DL (ref 5–25)
CALCIUM SERPL-MCNC: 9 MG/DL (ref 8.3–10.1)
CHLORIDE SERPL-SCNC: 111 MMOL/L (ref 100–108)
CO2 SERPL-SCNC: 18 MMOL/L (ref 21–32)
CREAT SERPL-MCNC: 1.68 MG/DL (ref 0.6–1.3)
EOSINOPHIL # BLD AUTO: 0.32 THOUSAND/ΜL (ref 0–0.61)
EOSINOPHIL NFR BLD AUTO: 5 % (ref 0–6)
ERYTHROCYTE [DISTWIDTH] IN BLOOD BY AUTOMATED COUNT: 16.8 % (ref 11.6–15.1)
GFR SERPL CREATININE-BSD FRML MDRD: 32 ML/MIN/1.73SQ M
GLUCOSE SERPL-MCNC: 131 MG/DL (ref 65–140)
HCT VFR BLD AUTO: 36.4 % (ref 34.8–46.1)
HGB BLD-MCNC: 11.7 G/DL (ref 11.5–15.4)
LYMPHOCYTES # BLD AUTO: 2.09 THOUSANDS/ΜL (ref 0.6–4.47)
LYMPHOCYTES NFR BLD AUTO: 30 % (ref 14–44)
MCH RBC QN AUTO: 28.9 PG (ref 26.8–34.3)
MCHC RBC AUTO-ENTMCNC: 32.1 G/DL (ref 31.4–37.4)
MCV RBC AUTO: 90 FL (ref 82–98)
MONOCYTES # BLD AUTO: 0.51 THOUSAND/ΜL (ref 0.17–1.22)
MONOCYTES NFR BLD AUTO: 7 % (ref 4–12)
NEUTROPHILS # BLD AUTO: 4.06 THOUSANDS/ΜL (ref 1.85–7.62)
NEUTS SEG NFR BLD AUTO: 57 % (ref 43–75)
NRBC BLD AUTO-RTO: 0 /100 WBCS
PLATELET # BLD AUTO: 203 THOUSANDS/UL (ref 149–390)
PMV BLD AUTO: 13.1 FL (ref 8.9–12.7)
POTASSIUM SERPL-SCNC: 4.1 MMOL/L (ref 3.5–5.3)
RBC # BLD AUTO: 4.05 MILLION/UL (ref 3.81–5.12)
SODIUM SERPL-SCNC: 139 MMOL/L (ref 136–145)
TACROLIMUS BLD LC/MS/MS-MCNC: 7.8 NG/ML (ref 2–20)
WBC # BLD AUTO: 7.05 THOUSAND/UL (ref 4.31–10.16)

## 2017-02-11 PROCEDURE — 80048 BASIC METABOLIC PNL TOTAL CA: CPT | Performed by: INTERNAL MEDICINE

## 2017-02-11 PROCEDURE — 85025 COMPLETE CBC W/AUTO DIFF WBC: CPT | Performed by: PHYSICIAN ASSISTANT

## 2017-02-11 RX ORDER — CEPHALEXIN 500 MG/1
500 CAPSULE ORAL 4 TIMES DAILY
Qty: 28 CAPSULE | Refills: 0 | Status: SHIPPED | OUTPATIENT
Start: 2017-02-11 | End: 2017-02-11

## 2017-02-11 RX ORDER — CEPHALEXIN 500 MG/1
500 CAPSULE ORAL 4 TIMES DAILY
Qty: 28 CAPSULE | Refills: 0 | Status: SHIPPED | OUTPATIENT
Start: 2017-02-11 | End: 2017-02-18

## 2017-02-11 RX ADMIN — CLONIDINE HYDROCHLORIDE 0.3 MG: 0.1 TABLET ORAL at 08:50

## 2017-02-11 RX ADMIN — SODIUM BICARBONATE 650 MG TABLET 650 MG: at 08:50

## 2017-02-11 RX ADMIN — METOPROLOL TARTRATE 50 MG: 50 TABLET ORAL at 08:51

## 2017-02-11 RX ADMIN — ASPIRIN 81 MG 81 MG: 81 TABLET ORAL at 08:51

## 2017-02-11 RX ADMIN — CLONIDINE HYDROCHLORIDE 0.2 MG: 0.1 TABLET ORAL at 12:06

## 2017-02-11 RX ADMIN — LEVOTHYROXINE SODIUM 88 MCG: 88 TABLET ORAL at 06:21

## 2017-02-11 RX ADMIN — HEPARIN SODIUM 5000 UNITS: 5000 INJECTION, SOLUTION INTRAVENOUS; SUBCUTANEOUS at 06:21

## 2017-02-11 RX ADMIN — ROPINIROLE 0.25 MG: 0.25 TABLET, FILM COATED ORAL at 08:52

## 2017-02-11 RX ADMIN — SERTRALINE HYDROCHLORIDE 200 MG: 100 TABLET, FILM COATED ORAL at 08:49

## 2017-02-11 RX ADMIN — VITAMIN D, TAB 1000IU (100/BT) 3000 UNITS: 25 TAB at 08:51

## 2017-02-11 RX ADMIN — PREDNISONE 2.5 MG: 2.5 TABLET ORAL at 08:52

## 2017-02-11 RX ADMIN — AMLODIPINE BESYLATE 5 MG: 5 TABLET ORAL at 08:51

## 2017-02-11 RX ADMIN — TACROLIMUS 4 MG: 1 CAPSULE ORAL at 08:50

## 2017-02-11 RX ADMIN — HYDRALAZINE HYDROCHLORIDE 50 MG: 50 TABLET ORAL at 08:50

## 2017-02-11 RX ADMIN — PANTOPRAZOLE SODIUM 40 MG: 40 TABLET, DELAYED RELEASE ORAL at 06:21

## 2017-02-11 RX ADMIN — DULOXETINE HYDROCHLORIDE 60 MG: 60 CAPSULE, DELAYED RELEASE ORAL at 08:49

## 2017-02-11 RX ADMIN — CEFTRIAXONE 1000 MG: 1 INJECTION, POWDER, FOR SOLUTION INTRAMUSCULAR; INTRAVENOUS at 12:06

## 2017-02-11 RX ADMIN — FOLIC ACID 1 MG: 1 TABLET ORAL at 08:49

## 2017-02-11 NOTE — DISCHARGE INSTRUCTIONS
Urinary Tract Infection in Women   WHAT YOU NEED TO KNOW:   A urinary tract infection (UTI) is caused by bacteria that get inside your urinary tract  Your urinary tract includes your kidneys, ureters, bladder, and urethra  Urine is made in your kidneys, and it flows from the ureters to the bladder  Urine leaves the bladder through the urethra  A UTI is more common in your lower urinary tract, which includes your bladder and urethra  DISCHARGE INSTRUCTIONS:   Seek care immediately if:   · You are urinating very little or not at all  · You are vomiting  · You have a high fever with shaking chills  · You have side or back pain that gets worse  Contact your healthcare provider if:   · You have a fever  · You have white or yellow discharge from your vagina  · You do not feel better after 2 days of taking antibiotics  · You have questions or concerns about your condition or care  Medicines:   · Medicines  help treat the bacterial infection or decrease pain and burning when you urinate  You may also need medicines to decrease the urge to urinate often  · Take your medicine as directed  Call your healthcare provider if you think your medicine is not helping or if you have side effects  Tell him if you are allergic to any medicine  Keep a list of the medicines, vitamins, and herbs you take  Include the amounts, and when and why you take them  Bring the list or the pill bottles to follow-up visits  Carry your medicine list with you in case of an emergency  Follow up with your healthcare provider as directed:  Write down your questions so you remember to ask them during your visits  Self-care:   · Urinate when you feel the urge  Do not hold your urine  Urinate as soon as you feel you have to  · Drink liquids as directed  Ask how much liquid to drink each day and which liquids are best for you  You may need to drink more liquids than usual to help flush out the bacteria   Do not drink alcohol, caffeine, and citrus juices  These can irritate your bladder and increase your symptoms  · Apply heat  on your abdomen for 20 to 30 minutes every 2 hours for as many days as directed  Heat helps decrease discomfort and pressure in your bladder  © 2016 [address] [employer] is for End User's use only and may not be sold, redistributed or otherwise used for commercial purposes  All illustrations and images included in CareNotes® are the copyrighted property of [employer] , [employer]  or Tamir Leblanc  The above information is an  only  It is not intended as medical advice for individual conditions or treatments  Talk to your doctor, nurse or pharmacist before following any medical regimen to see if it is safe and effective for you

## 2017-02-11 NOTE — DISCHARGE SUMMARY
Discharge Summary - [facility] Internal Medicine    Patient Information: Dominik Mg 45 y o  female MRN: [id number]  Unit/Bed#: [facility] 813-01 Encounter: [id number]    Discharging Physician / Practitioner: Nieves Ingram PA-C  PCP: Mac Hu MD  Admission Date: 2/8/2017  Discharge Date: 02/11/17    Reason for Admission: UTI    Discharge Diagnoses:     Principal Problem:    UTI (urinary tract infection)  Active Problems:    ARF (acute renal failure)    Benign hypertension with CKD (chronic kidney disease) stage IV    Status post simultaneous kidney and pancreas transplant    Low bicarbonate level    Immunosuppression  Resolved Problems:    * No resolved hospital problems  *      Consultations During Hospital Stay:  · Nephrology    Procedures Performed:     · CXR (2/8/17): Unremarkable  · US kidney & bladder (2/8/17): Left transplant kidney demonstrates no evidence of hydronephrosis  However, mildly abnormal arterial waveforms in transplant kidney, which suggests WILIAN  Bilateral atrophic native kidneys  · VAS renal artery (2/9/17): No evidence of significant arterial occlusive disease in main renal artery  Patient renal vein  Abnormal parenchymal flow noted with renovascular resistive index of 0 88  Significant Findings:     · E  Coli UTI  · Acute renal failure    Incidental Findings:   · None     Test Results Pending at Discharge (will require follow up): · None     Outpatient Tests Requested:  · None    Complications:  None    Hospital Course: Dominik Mg is a 45 y o  female patient who originally presented to the hospital on 2/8/2017 due to dysuria, suprapubic pain, and generalized weakness and fatigue  Patient is status post kidney pancreas transplant approximately 20 years ago at [facility]  She follows with Dr Gilford Norris and has had multiple admissions in the past with similar symptoms  She was found to be in acute renal failure on top of CKD (baseline creatinine 1 9)   Her urinalysis showed positive nitrites, 30-50 WBCs, innumerable bacteria  She was also found to have non-anion gap metabolic acidosis  She was evaluated by nephrology and was treated with bicarb drip, IV fluids, and IV Rocephin  Her kidney function improved back to baseline prior to discharge  Her urine culture was positive for E  Coli, susceptible to cefazolin  She was treated with 3 days of IV Rocephin and will be sent home with an additional 7 days of Keflex  Spoke with nephrology on the day of discharge  Her creatinine was back to baseline on the day prior to discharge, and she was monitored overnight without IV fluids  Her creatinine remain stable, and nephrology has cleared this patient for discharge  She can resume follow up with Dr Cristy Singleton as an outpatient  Condition at Discharge: good     Discharge Day Visit / Exam:     Subjective:  Patient feeling well today  No abdominal pain, N/V, fever/chills, dysuria, flank pain, suprapubic pain  Patient feels back to normal and is ready for discharge  Vitals: Blood Pressure: 158/72 (02/11/17 0725)  Pulse: 60 (02/11/17 0725)  Temperature: 98 2 °F (36 8 °C) (02/11/17 0725)  Temp Source: Oral (02/11/17 0725)  Respirations: 18 (02/11/17 0725)  Height: 5' 7" (170 2 cm) (02/08/17 1720)  Weight - Scale: 94 8 kg (209 lb) (02/08/17 1720)  SpO2: 94 % (02/11/17 0725)  Exam:   Physical Exam   Constitutional: She is oriented to person, place, and time  She appears well-developed and well-nourished  No distress  HENT:   Head: Normocephalic and atraumatic  Eyes: EOM are normal  No scleral icterus  Neck: Normal range of motion  Neck supple  Cardiovascular: Normal rate, regular rhythm and normal heart sounds  Pulmonary/Chest: Breath sounds normal  No respiratory distress  She has no wheezes  She has no rales  Abdominal: Soft  Bowel sounds are normal  She exhibits no distension  There is no tenderness  There is no rebound and no guarding  Musculoskeletal: She exhibits no edema  Neurological: She is alert and oriented to person, place, and time  Skin: Skin is warm and dry  She is not diaphoretic  Psychiatric: She has a normal mood and affect  Her behavior is normal  Thought content normal        Discharge instructions/Information to patient and family:   See after visit summary for information provided to patient and family  Provisions for Follow-Up Care:  See after visit summary for information related to follow-up care and any pertinent home health orders  Disposition:     Home    For Discharges to [facility]:   · Not Applicable to this Patient - Not Applicable to this Patient    Planned Readmission: None     Discharge Statement:  I spent 35 minutes discharging the patient  This time was spent on the day of discharge  I had direct contact with the patient on the day of discharge  Greater than 50% of the total time was spent examining patient, answering all patient questions, arranging and discussing plan of care with patient as well as directly providing post-discharge instructions  Additional time then spent on discharge activities  Discharge Medications:  See after visit summary for reconciled discharge medications provided to patient and family  ** Please Note: Dragon 360 Dictation voice to text software may have been used in the creation of this document   **

## 2017-02-11 NOTE — PLAN OF CARE
DISCHARGE PLANNING     Discharge to home or other facility with appropriate resources Progressing        DISCHARGE PLANNING - CARE MANAGEMENT     Discharge to post-acute care or home with appropriate resources Progressing        Knowledge Deficit     Patient/family/caregiver demonstrates understanding of disease process, treatment plan, medications, and discharge instructions Progressing        Potential for Falls     Patient will remain free of falls Progressing

## 2017-02-12 LAB — TACROLIMUS BLD LC/MS/MS-MCNC: 7.4 NG/ML (ref 2–20)

## 2017-02-13 LAB
BACTERIA BLD CULT: NORMAL
BACTERIA BLD CULT: NORMAL

## 2017-02-14 ENCOUNTER — APPOINTMENT (OUTPATIENT)
Dept: LAB | Age: 52
End: 2017-02-14
Payer: MEDICARE

## 2017-02-14 DIAGNOSIS — Z94.0 HISTORY OF KIDNEY TRANSPLANT: ICD-10-CM

## 2017-02-14 DIAGNOSIS — I12.9 HYPERTENSIVE CHRONIC KIDNEY DISEASE WITH STAGE 1 THROUGH STAGE 4 CHRONIC KIDNEY DISEASE, OR UNSPECIFIED CHRONIC KIDNEY DISEASE: ICD-10-CM

## 2017-02-14 DIAGNOSIS — N18.4 CHRONIC KIDNEY DISEASE, STAGE IV (SEVERE) (HCC): ICD-10-CM

## 2017-02-14 DIAGNOSIS — N25.81 SECONDARY HYPERPARATHYROIDISM OF RENAL ORIGIN (HCC): ICD-10-CM

## 2017-02-14 DIAGNOSIS — R80.9 PROTEINURIA: ICD-10-CM

## 2017-02-14 LAB
ALBUMIN SERPL BCP-MCNC: 2.9 G/DL (ref 3.5–5)
ALP SERPL-CCNC: 89 U/L (ref 46–116)
ALT SERPL W P-5'-P-CCNC: 37 U/L (ref 12–78)
ANION GAP SERPL CALCULATED.3IONS-SCNC: 12 MMOL/L (ref 4–13)
AST SERPL W P-5'-P-CCNC: 29 U/L (ref 5–45)
BILIRUB SERPL-MCNC: 0.32 MG/DL (ref 0.2–1)
BUN SERPL-MCNC: 35 MG/DL (ref 5–25)
CALCIUM SERPL-MCNC: 9.1 MG/DL (ref 8.3–10.1)
CHLORIDE SERPL-SCNC: 99 MMOL/L (ref 100–108)
CO2 SERPL-SCNC: 21 MMOL/L (ref 21–32)
CREAT SERPL-MCNC: 1.76 MG/DL (ref 0.6–1.3)
ERYTHROCYTE [DISTWIDTH] IN BLOOD BY AUTOMATED COUNT: 16.2 % (ref 11.6–15.1)
GFR SERPL CREATININE-BSD FRML MDRD: 30.3 ML/MIN/1.73SQ M
GLUCOSE SERPL-MCNC: 155 MG/DL (ref 65–140)
HCT VFR BLD AUTO: 36.1 % (ref 34.8–46.1)
HGB BLD-MCNC: 11.8 G/DL (ref 11.5–15.4)
MAGNESIUM SERPL-MCNC: 1.8 MG/DL (ref 1.6–2.6)
MCH RBC QN AUTO: 29.4 PG (ref 26.8–34.3)
MCHC RBC AUTO-ENTMCNC: 32.7 G/DL (ref 31.4–37.4)
MCV RBC AUTO: 90 FL (ref 82–98)
PHOSPHATE SERPL-MCNC: 4.2 MG/DL (ref 2.7–4.5)
PLATELET # BLD AUTO: 192 THOUSANDS/UL (ref 149–390)
PMV BLD AUTO: 12.6 FL (ref 8.9–12.7)
POTASSIUM SERPL-SCNC: 3.8 MMOL/L (ref 3.5–5.3)
PROT SERPL-MCNC: 8.1 G/DL (ref 6.4–8.2)
RBC # BLD AUTO: 4.01 MILLION/UL (ref 3.81–5.12)
SODIUM SERPL-SCNC: 132 MMOL/L (ref 136–145)
WBC # BLD AUTO: 10.21 THOUSAND/UL (ref 4.31–10.16)

## 2017-02-14 PROCEDURE — 36415 COLL VENOUS BLD VENIPUNCTURE: CPT

## 2017-02-14 PROCEDURE — 85027 COMPLETE CBC AUTOMATED: CPT

## 2017-02-14 PROCEDURE — 80053 COMPREHEN METABOLIC PANEL: CPT

## 2017-02-14 PROCEDURE — 80197 ASSAY OF TACROLIMUS: CPT

## 2017-02-14 PROCEDURE — 83735 ASSAY OF MAGNESIUM: CPT

## 2017-02-14 PROCEDURE — 84100 ASSAY OF PHOSPHORUS: CPT

## 2017-02-16 LAB — TACROLIMUS BLD LC/MS/MS-MCNC: 6.1 NG/ML (ref 2–20)

## 2017-02-20 ENCOUNTER — APPOINTMENT (OUTPATIENT)
Dept: LAB | Age: 52
End: 2017-02-20
Payer: MEDICARE

## 2017-02-20 ENCOUNTER — TRANSCRIBE ORDERS (OUTPATIENT)
Dept: ADMINISTRATIVE | Age: 52
End: 2017-02-20

## 2017-02-20 ENCOUNTER — ALLSCRIPTS OFFICE VISIT (OUTPATIENT)
Dept: OTHER | Facility: OTHER | Age: 52
End: 2017-02-20

## 2017-02-20 DIAGNOSIS — D64.9 ANEMIA: ICD-10-CM

## 2017-02-20 DIAGNOSIS — N18.4 CHRONIC KIDNEY DISEASE, STAGE IV (SEVERE) (HCC): ICD-10-CM

## 2017-02-20 LAB
ANION GAP SERPL CALCULATED.3IONS-SCNC: 8 MMOL/L (ref 4–13)
BUN SERPL-MCNC: 47 MG/DL (ref 5–25)
CALCIUM SERPL-MCNC: 8.9 MG/DL (ref 8.3–10.1)
CHLORIDE SERPL-SCNC: 107 MMOL/L (ref 100–108)
CO2 SERPL-SCNC: 20 MMOL/L (ref 21–32)
CREAT SERPL-MCNC: 2.14 MG/DL (ref 0.6–1.3)
ERYTHROCYTE [DISTWIDTH] IN BLOOD BY AUTOMATED COUNT: 16.9 % (ref 11.6–15.1)
GFR SERPL CREATININE-BSD FRML MDRD: 24.2 ML/MIN/1.73SQ M
GLUCOSE SERPL-MCNC: 169 MG/DL (ref 65–140)
HCT VFR BLD AUTO: 36.2 % (ref 34.8–46.1)
HGB BLD-MCNC: 11.7 G/DL (ref 11.5–15.4)
MCH RBC QN AUTO: 29.9 PG (ref 26.8–34.3)
MCHC RBC AUTO-ENTMCNC: 32.3 G/DL (ref 31.4–37.4)
MCV RBC AUTO: 93 FL (ref 82–98)
PLATELET # BLD AUTO: 228 THOUSANDS/UL (ref 149–390)
PMV BLD AUTO: 13.4 FL (ref 8.9–12.7)
POTASSIUM SERPL-SCNC: 4.7 MMOL/L (ref 3.5–5.3)
RBC # BLD AUTO: 3.91 MILLION/UL (ref 3.81–5.12)
SODIUM SERPL-SCNC: 135 MMOL/L (ref 136–145)
WBC # BLD AUTO: 12.69 THOUSAND/UL (ref 4.31–10.16)

## 2017-02-20 PROCEDURE — 80048 BASIC METABOLIC PNL TOTAL CA: CPT

## 2017-02-20 PROCEDURE — 36415 COLL VENOUS BLD VENIPUNCTURE: CPT

## 2017-02-20 PROCEDURE — 85027 COMPLETE CBC AUTOMATED: CPT

## 2017-02-21 ENCOUNTER — ALLSCRIPTS OFFICE VISIT (OUTPATIENT)
Dept: OTHER | Facility: OTHER | Age: 52
End: 2017-02-21

## 2017-02-23 ENCOUNTER — APPOINTMENT (OUTPATIENT)
Dept: LAB | Age: 52
End: 2017-02-23
Payer: MEDICARE

## 2017-02-23 ENCOUNTER — TRANSCRIBE ORDERS (OUTPATIENT)
Dept: ADMINISTRATIVE | Age: 52
End: 2017-02-23

## 2017-02-23 DIAGNOSIS — N18.4 CHRONIC KIDNEY DISEASE, STAGE IV (SEVERE) (HCC): ICD-10-CM

## 2017-02-23 LAB
ANION GAP SERPL CALCULATED.3IONS-SCNC: 10 MMOL/L (ref 4–13)
BUN SERPL-MCNC: 36 MG/DL (ref 5–25)
CALCIUM SERPL-MCNC: 9.3 MG/DL (ref 8.3–10.1)
CHLORIDE SERPL-SCNC: 108 MMOL/L (ref 100–108)
CO2 SERPL-SCNC: 21 MMOL/L (ref 21–32)
CREAT SERPL-MCNC: 1.87 MG/DL (ref 0.6–1.3)
GFR SERPL CREATININE-BSD FRML MDRD: 28.3 ML/MIN/1.73SQ M
GLUCOSE SERPL-MCNC: 138 MG/DL (ref 65–140)
POTASSIUM SERPL-SCNC: 4.4 MMOL/L (ref 3.5–5.3)
SODIUM SERPL-SCNC: 139 MMOL/L (ref 136–145)

## 2017-02-23 PROCEDURE — 80048 BASIC METABOLIC PNL TOTAL CA: CPT

## 2017-02-23 PROCEDURE — 36415 COLL VENOUS BLD VENIPUNCTURE: CPT

## 2017-03-02 ENCOUNTER — ALLSCRIPTS OFFICE VISIT (OUTPATIENT)
Dept: OTHER | Facility: OTHER | Age: 52
End: 2017-03-02

## 2017-03-03 ENCOUNTER — GENERIC CONVERSION - ENCOUNTER (OUTPATIENT)
Dept: OTHER | Facility: OTHER | Age: 52
End: 2017-03-03

## 2017-03-03 ENCOUNTER — TRANSCRIBE ORDERS (OUTPATIENT)
Dept: ADMINISTRATIVE | Age: 52
End: 2017-03-03

## 2017-03-03 ENCOUNTER — APPOINTMENT (OUTPATIENT)
Dept: LAB | Age: 52
End: 2017-03-03
Payer: MEDICARE

## 2017-03-03 DIAGNOSIS — N18.30 CHRONIC KIDNEY DISEASE, STAGE III (MODERATE) (HCC): ICD-10-CM

## 2017-03-03 DIAGNOSIS — E87.2 ACIDOSIS: ICD-10-CM

## 2017-03-03 DIAGNOSIS — I12.9 HYPERTENSIVE CHRONIC KIDNEY DISEASE WITH STAGE 1 THROUGH STAGE 4 CHRONIC KIDNEY DISEASE, OR UNSPECIFIED CHRONIC KIDNEY DISEASE: ICD-10-CM

## 2017-03-03 DIAGNOSIS — N25.81 SECONDARY HYPERPARATHYROIDISM OF RENAL ORIGIN (HCC): ICD-10-CM

## 2017-03-03 DIAGNOSIS — Z94.0 HISTORY OF KIDNEY TRANSPLANT: ICD-10-CM

## 2017-03-03 DIAGNOSIS — R80.9 PROTEINURIA: ICD-10-CM

## 2017-03-03 LAB
ALBUMIN SERPL BCP-MCNC: 2.9 G/DL (ref 3.5–5)
ALP SERPL-CCNC: 92 U/L (ref 46–116)
ALT SERPL W P-5'-P-CCNC: 21 U/L (ref 12–78)
ANION GAP SERPL CALCULATED.3IONS-SCNC: 9 MMOL/L (ref 4–13)
AST SERPL W P-5'-P-CCNC: 15 U/L (ref 5–45)
BILIRUB SERPL-MCNC: 0.28 MG/DL (ref 0.2–1)
BUN SERPL-MCNC: 36 MG/DL (ref 5–25)
CALCIUM SERPL-MCNC: 9.2 MG/DL (ref 8.3–10.1)
CHLORIDE SERPL-SCNC: 108 MMOL/L (ref 100–108)
CO2 SERPL-SCNC: 22 MMOL/L (ref 21–32)
CREAT SERPL-MCNC: 1.71 MG/DL (ref 0.6–1.3)
ERYTHROCYTE [DISTWIDTH] IN BLOOD BY AUTOMATED COUNT: 16.8 % (ref 11.6–15.1)
GFR SERPL CREATININE-BSD FRML MDRD: 31.3 ML/MIN/1.73SQ M
GLUCOSE SERPL-MCNC: 130 MG/DL (ref 65–140)
HCT VFR BLD AUTO: 38 % (ref 34.8–46.1)
HGB BLD-MCNC: 12.4 G/DL (ref 11.5–15.4)
MAGNESIUM SERPL-MCNC: 2.4 MG/DL (ref 1.6–2.6)
MCH RBC QN AUTO: 30.3 PG (ref 26.8–34.3)
MCHC RBC AUTO-ENTMCNC: 32.6 G/DL (ref 31.4–37.4)
MCV RBC AUTO: 93 FL (ref 82–98)
PHOSPHATE SERPL-MCNC: 3.8 MG/DL (ref 2.7–4.5)
PLATELET # BLD AUTO: 230 THOUSANDS/UL (ref 149–390)
PMV BLD AUTO: 13.5 FL (ref 8.9–12.7)
POTASSIUM SERPL-SCNC: 4.2 MMOL/L (ref 3.5–5.3)
PROT SERPL-MCNC: 8.3 G/DL (ref 6.4–8.2)
RBC # BLD AUTO: 4.09 MILLION/UL (ref 3.81–5.12)
SODIUM SERPL-SCNC: 139 MMOL/L (ref 136–145)
WBC # BLD AUTO: 11.52 THOUSAND/UL (ref 4.31–10.16)

## 2017-03-03 PROCEDURE — 36415 COLL VENOUS BLD VENIPUNCTURE: CPT

## 2017-03-03 PROCEDURE — 80053 COMPREHEN METABOLIC PANEL: CPT

## 2017-03-03 PROCEDURE — 84100 ASSAY OF PHOSPHORUS: CPT

## 2017-03-03 PROCEDURE — 80197 ASSAY OF TACROLIMUS: CPT

## 2017-03-03 PROCEDURE — 85027 COMPLETE CBC AUTOMATED: CPT

## 2017-03-03 PROCEDURE — 83735 ASSAY OF MAGNESIUM: CPT

## 2017-03-05 LAB — TACROLIMUS BLD LC/MS/MS-MCNC: 4.5 NG/ML (ref 2–20)

## 2017-03-17 ENCOUNTER — GENERIC CONVERSION - ENCOUNTER (OUTPATIENT)
Dept: OTHER | Facility: OTHER | Age: 52
End: 2017-03-17

## 2017-03-20 ENCOUNTER — GENERIC CONVERSION - ENCOUNTER (OUTPATIENT)
Dept: OTHER | Facility: OTHER | Age: 52
End: 2017-03-20

## 2017-03-28 ENCOUNTER — ALLSCRIPTS OFFICE VISIT (OUTPATIENT)
Dept: OTHER | Facility: OTHER | Age: 52
End: 2017-03-28

## 2017-04-03 ENCOUNTER — APPOINTMENT (OUTPATIENT)
Dept: LAB | Age: 52
End: 2017-04-03
Payer: MEDICARE

## 2017-04-03 ENCOUNTER — TRANSCRIBE ORDERS (OUTPATIENT)
Dept: ADMINISTRATIVE | Age: 52
End: 2017-04-03

## 2017-04-03 DIAGNOSIS — Z94.0 KIDNEY REPLACED BY TRANSPLANT: ICD-10-CM

## 2017-04-03 DIAGNOSIS — E21.1 HYPERPARATHYROIDISM DUE TO 1,25(0H)2D3 (HCC): ICD-10-CM

## 2017-04-03 DIAGNOSIS — R80.9 PROTEINURIA, UNSPECIFIED TYPE: ICD-10-CM

## 2017-04-03 DIAGNOSIS — N18.4 CHRONIC KIDNEY DISEASE, STAGE IV (SEVERE) (HCC): ICD-10-CM

## 2017-04-03 DIAGNOSIS — N18.4 CHRONIC KIDNEY DISEASE, STAGE IV (SEVERE) (HCC): Primary | ICD-10-CM

## 2017-04-03 DIAGNOSIS — Z94.0 HISTORY OF KIDNEY TRANSPLANT: ICD-10-CM

## 2017-04-03 DIAGNOSIS — R80.9 PROTEINURIA: ICD-10-CM

## 2017-04-03 DIAGNOSIS — I12.9 HYPERTENSIVE CHRONIC KIDNEY DISEASE WITH STAGE 1 THROUGH STAGE 4 CHRONIC KIDNEY DISEASE, OR UNSPECIFIED CHRONIC KIDNEY DISEASE: ICD-10-CM

## 2017-04-03 DIAGNOSIS — N25.81 SECONDARY HYPERPARATHYROIDISM OF RENAL ORIGIN (HCC): ICD-10-CM

## 2017-04-03 LAB
ALBUMIN SERPL BCP-MCNC: 3 G/DL (ref 3.5–5)
ALP SERPL-CCNC: 117 U/L (ref 46–116)
ALT SERPL W P-5'-P-CCNC: 28 U/L (ref 12–78)
ANION GAP SERPL CALCULATED.3IONS-SCNC: 9 MMOL/L (ref 4–13)
AST SERPL W P-5'-P-CCNC: 16 U/L (ref 5–45)
BILIRUB SERPL-MCNC: 0.33 MG/DL (ref 0.2–1)
BUN SERPL-MCNC: 33 MG/DL (ref 5–25)
CALCIUM SERPL-MCNC: 9.1 MG/DL (ref 8.3–10.1)
CHLORIDE SERPL-SCNC: 108 MMOL/L (ref 100–108)
CO2 SERPL-SCNC: 20 MMOL/L (ref 21–32)
CREAT SERPL-MCNC: 1.62 MG/DL (ref 0.6–1.3)
ERYTHROCYTE [DISTWIDTH] IN BLOOD BY AUTOMATED COUNT: 15.8 % (ref 11.6–15.1)
GFR SERPL CREATININE-BSD FRML MDRD: 33.2 ML/MIN/1.73SQ M
GLUCOSE P FAST SERPL-MCNC: 143 MG/DL (ref 65–99)
HCT VFR BLD AUTO: 33.5 % (ref 34.8–46.1)
HGB BLD-MCNC: 10.8 G/DL (ref 11.5–15.4)
MAGNESIUM SERPL-MCNC: 2.1 MG/DL (ref 1.6–2.6)
MCH RBC QN AUTO: 30.2 PG (ref 26.8–34.3)
MCHC RBC AUTO-ENTMCNC: 32.2 G/DL (ref 31.4–37.4)
MCV RBC AUTO: 94 FL (ref 82–98)
PHOSPHATE SERPL-MCNC: 4.2 MG/DL (ref 2.7–4.5)
PLATELET # BLD AUTO: 207 THOUSANDS/UL (ref 149–390)
PMV BLD AUTO: 13 FL (ref 8.9–12.7)
POTASSIUM SERPL-SCNC: 4 MMOL/L (ref 3.5–5.3)
PROT SERPL-MCNC: 8 G/DL (ref 6.4–8.2)
RBC # BLD AUTO: 3.58 MILLION/UL (ref 3.81–5.12)
SODIUM SERPL-SCNC: 137 MMOL/L (ref 136–145)
WBC # BLD AUTO: 8.65 THOUSAND/UL (ref 4.31–10.16)

## 2017-04-03 PROCEDURE — 83735 ASSAY OF MAGNESIUM: CPT

## 2017-04-03 PROCEDURE — 36415 COLL VENOUS BLD VENIPUNCTURE: CPT

## 2017-04-03 PROCEDURE — 85027 COMPLETE CBC AUTOMATED: CPT

## 2017-04-03 PROCEDURE — 84100 ASSAY OF PHOSPHORUS: CPT

## 2017-04-03 PROCEDURE — 80197 ASSAY OF TACROLIMUS: CPT

## 2017-04-03 PROCEDURE — 80053 COMPREHEN METABOLIC PANEL: CPT

## 2017-04-05 ENCOUNTER — ALLSCRIPTS OFFICE VISIT (OUTPATIENT)
Dept: OTHER | Facility: OTHER | Age: 52
End: 2017-04-05

## 2017-04-05 LAB — TACROLIMUS BLD LC/MS/MS-MCNC: 3.7 NG/ML (ref 2–20)

## 2017-04-06 ENCOUNTER — GENERIC CONVERSION - ENCOUNTER (OUTPATIENT)
Dept: OTHER | Facility: OTHER | Age: 52
End: 2017-04-06

## 2017-04-10 ENCOUNTER — LAB (OUTPATIENT)
Dept: LAB | Age: 52
End: 2017-04-10
Payer: MEDICARE

## 2017-04-10 DIAGNOSIS — D64.9 ANEMIA: ICD-10-CM

## 2017-04-10 LAB — HEMOCCULT STL QL IA: NEGATIVE

## 2017-04-10 PROCEDURE — G0328 FECAL BLOOD SCRN IMMUNOASSAY: HCPCS

## 2017-04-13 ENCOUNTER — GENERIC CONVERSION - ENCOUNTER (OUTPATIENT)
Dept: OTHER | Facility: OTHER | Age: 52
End: 2017-04-13

## 2017-04-14 ENCOUNTER — HOSPITAL ENCOUNTER (OUTPATIENT)
Dept: RADIOLOGY | Age: 52
Discharge: HOME | End: 2017-04-14
Payer: MEDICARE

## 2017-04-14 DIAGNOSIS — Z12.31 ENCOUNTER FOR SCREENING MAMMOGRAM FOR MALIGNANT NEOPLASM OF BREAST: ICD-10-CM

## 2017-04-14 PROCEDURE — G0202 SCR MAMMO BI INCL CAD: HCPCS

## 2017-04-20 ENCOUNTER — ALLSCRIPTS OFFICE VISIT (OUTPATIENT)
Dept: OTHER | Facility: OTHER | Age: 52
End: 2017-04-20

## 2017-04-24 ENCOUNTER — TRANSCRIBE ORDERS (OUTPATIENT)
Dept: ADMINISTRATIVE | Age: 52
End: 2017-04-24

## 2017-04-24 ENCOUNTER — APPOINTMENT (OUTPATIENT)
Dept: LAB | Age: 52
End: 2017-04-24
Payer: MEDICARE

## 2017-04-24 DIAGNOSIS — N18.4 CHRONIC KIDNEY DISEASE, STAGE IV (SEVERE) (HCC): ICD-10-CM

## 2017-04-24 DIAGNOSIS — I12.9 HYPERTENSIVE CHRONIC KIDNEY DISEASE WITH STAGE 1 THROUGH STAGE 4 CHRONIC KIDNEY DISEASE, OR UNSPECIFIED CHRONIC KIDNEY DISEASE: ICD-10-CM

## 2017-04-24 DIAGNOSIS — N25.81 SECONDARY HYPERPARATHYROIDISM OF RENAL ORIGIN (HCC): ICD-10-CM

## 2017-04-24 DIAGNOSIS — Z94.0 HISTORY OF KIDNEY TRANSPLANT: ICD-10-CM

## 2017-04-24 DIAGNOSIS — R80.9 PROTEINURIA: ICD-10-CM

## 2017-04-24 LAB
ALBUMIN SERPL BCP-MCNC: 3.1 G/DL (ref 3.5–5)
ALP SERPL-CCNC: 112 U/L (ref 46–116)
ALT SERPL W P-5'-P-CCNC: 30 U/L (ref 12–78)
ANION GAP SERPL CALCULATED.3IONS-SCNC: 9 MMOL/L (ref 4–13)
AST SERPL W P-5'-P-CCNC: 14 U/L (ref 5–45)
BILIRUB SERPL-MCNC: 0.33 MG/DL (ref 0.2–1)
BUN SERPL-MCNC: 38 MG/DL (ref 5–25)
CALCIUM SERPL-MCNC: 9 MG/DL (ref 8.3–10.1)
CHLORIDE SERPL-SCNC: 107 MMOL/L (ref 100–108)
CO2 SERPL-SCNC: 22 MMOL/L (ref 21–32)
CREAT SERPL-MCNC: 1.55 MG/DL (ref 0.6–1.3)
ERYTHROCYTE [DISTWIDTH] IN BLOOD BY AUTOMATED COUNT: 15.4 % (ref 11.6–15.1)
GFR SERPL CREATININE-BSD FRML MDRD: 35 ML/MIN/1.73SQ M
GLUCOSE P FAST SERPL-MCNC: 131 MG/DL (ref 65–99)
HCT VFR BLD AUTO: 32.9 % (ref 34.8–46.1)
HGB BLD-MCNC: 10.6 G/DL (ref 11.5–15.4)
MAGNESIUM SERPL-MCNC: 2 MG/DL (ref 1.6–2.6)
MCH RBC QN AUTO: 29.4 PG (ref 26.8–34.3)
MCHC RBC AUTO-ENTMCNC: 32.2 G/DL (ref 31.4–37.4)
MCV RBC AUTO: 91 FL (ref 82–98)
PHOSPHATE SERPL-MCNC: 3.6 MG/DL (ref 2.7–4.5)
PLATELET # BLD AUTO: 210 THOUSANDS/UL (ref 149–390)
PMV BLD AUTO: 13.5 FL (ref 8.9–12.7)
POTASSIUM SERPL-SCNC: 3.5 MMOL/L (ref 3.5–5.3)
PROT SERPL-MCNC: 8.3 G/DL (ref 6.4–8.2)
RBC # BLD AUTO: 3.6 MILLION/UL (ref 3.81–5.12)
SODIUM SERPL-SCNC: 138 MMOL/L (ref 136–145)
WBC # BLD AUTO: 9.26 THOUSAND/UL (ref 4.31–10.16)

## 2017-04-24 PROCEDURE — 80053 COMPREHEN METABOLIC PANEL: CPT

## 2017-04-24 PROCEDURE — 83735 ASSAY OF MAGNESIUM: CPT

## 2017-04-24 PROCEDURE — 36415 COLL VENOUS BLD VENIPUNCTURE: CPT

## 2017-04-24 PROCEDURE — 80197 ASSAY OF TACROLIMUS: CPT

## 2017-04-24 PROCEDURE — 85027 COMPLETE CBC AUTOMATED: CPT

## 2017-04-24 PROCEDURE — 84100 ASSAY OF PHOSPHORUS: CPT

## 2017-04-26 ENCOUNTER — GENERIC CONVERSION - ENCOUNTER (OUTPATIENT)
Dept: OTHER | Facility: OTHER | Age: 52
End: 2017-04-26

## 2017-04-26 LAB — TACROLIMUS BLD LC/MS/MS-MCNC: 3.3 NG/ML (ref 2–20)

## 2017-04-27 ENCOUNTER — TRANSCRIBE ORDERS (OUTPATIENT)
Dept: ADMINISTRATIVE | Age: 52
End: 2017-04-27

## 2017-04-27 ENCOUNTER — APPOINTMENT (OUTPATIENT)
Dept: LAB | Age: 52
End: 2017-04-27
Payer: MEDICARE

## 2017-04-27 DIAGNOSIS — Z94.0 HISTORY OF KIDNEY TRANSPLANT: ICD-10-CM

## 2017-04-27 DIAGNOSIS — Z94.0 KIDNEY REPLACED BY TRANSPLANT: Primary | ICD-10-CM

## 2017-04-27 DIAGNOSIS — Z94.0 KIDNEY REPLACED BY TRANSPLANT: ICD-10-CM

## 2017-04-27 PROCEDURE — 36415 COLL VENOUS BLD VENIPUNCTURE: CPT

## 2017-04-27 PROCEDURE — 80197 ASSAY OF TACROLIMUS: CPT

## 2017-04-28 ENCOUNTER — HOSPITAL ENCOUNTER (OUTPATIENT)
Dept: INFUSION CENTER | Facility: HOSPITAL | Age: 52
Discharge: HOME | End: 2017-04-28
Payer: MEDICARE

## 2017-04-28 VITALS
HEART RATE: 74 BPM | SYSTOLIC BLOOD PRESSURE: 147 MMHG | TEMPERATURE: 97.5 F | RESPIRATION RATE: 16 BRPM | DIASTOLIC BLOOD PRESSURE: 63 MMHG

## 2017-04-28 PROCEDURE — 96365 THER/PROPH/DIAG IV INF INIT: CPT

## 2017-04-28 PROCEDURE — 96366 THER/PROPH/DIAG IV INF ADDON: CPT

## 2017-04-28 RX ADMIN — IRON SUCROSE 300 MG: 20 INJECTION, SOLUTION INTRAVENOUS at 13:52

## 2017-04-28 NOTE — PLAN OF CARE
Problem: Potential for Falls  Goal: Patient will remain free of falls  INTERVENTIONS:  - Assess patient frequently for physical needs  - Identify cognitive and physical deficits and behaviors that affect risk of falls    - [place] fall precautions as indicated by assessment   - Educate patient/family on patient safety including physical limitations  - Instruct patient to call for assistance with activity based on assessment  - Modify environment to reduce risk of injury  - Consider OT/PT consult to assist with strengthening/mobility   Outcome: Progressing

## 2017-04-30 LAB — TACROLIMUS BLD LC/MS/MS-MCNC: 4.7 NG/ML (ref 2–20)

## 2017-05-04 ENCOUNTER — ALLSCRIPTS OFFICE VISIT (OUTPATIENT)
Dept: OTHER | Facility: OTHER | Age: 52
End: 2017-05-04

## 2017-05-04 DIAGNOSIS — Z12.11 ENCOUNTER FOR SCREENING FOR MALIGNANT NEOPLASM OF COLON: ICD-10-CM

## 2017-05-04 DIAGNOSIS — E11.21 TYPE 2 DIABETES MELLITUS WITH DIABETIC NEPHROPATHY (HCC): ICD-10-CM

## 2017-05-04 DIAGNOSIS — I12.9 HYPERTENSIVE CHRONIC KIDNEY DISEASE WITH STAGE 1 THROUGH STAGE 4 CHRONIC KIDNEY DISEASE, OR UNSPECIFIED CHRONIC KIDNEY DISEASE: ICD-10-CM

## 2017-05-04 DIAGNOSIS — E87.2 ACIDOSIS: ICD-10-CM

## 2017-05-04 DIAGNOSIS — N18.4 CHRONIC KIDNEY DISEASE, STAGE IV (SEVERE) (HCC): ICD-10-CM

## 2017-05-04 DIAGNOSIS — R80.9 PROTEINURIA: ICD-10-CM

## 2017-05-04 DIAGNOSIS — N25.81 SECONDARY HYPERPARATHYROIDISM OF RENAL ORIGIN (HCC): ICD-10-CM

## 2017-05-04 DIAGNOSIS — Z94.0 HISTORY OF KIDNEY TRANSPLANT: ICD-10-CM

## 2017-05-09 ENCOUNTER — APPOINTMENT (OUTPATIENT)
Dept: LAB | Age: 52
End: 2017-05-09
Payer: MEDICARE

## 2017-05-09 DIAGNOSIS — Z94.0 HISTORY OF KIDNEY TRANSPLANT: ICD-10-CM

## 2017-05-09 DIAGNOSIS — Z12.11 ENCOUNTER FOR SCREENING FOR MALIGNANT NEOPLASM OF COLON: ICD-10-CM

## 2017-05-09 DIAGNOSIS — E87.2 ACIDOSIS: ICD-10-CM

## 2017-05-09 DIAGNOSIS — R80.9 PROTEINURIA: ICD-10-CM

## 2017-05-09 DIAGNOSIS — N25.81 SECONDARY HYPERPARATHYROIDISM OF RENAL ORIGIN (HCC): ICD-10-CM

## 2017-05-09 DIAGNOSIS — I12.9 HYPERTENSIVE CHRONIC KIDNEY DISEASE WITH STAGE 1 THROUGH STAGE 4 CHRONIC KIDNEY DISEASE, OR UNSPECIFIED CHRONIC KIDNEY DISEASE: ICD-10-CM

## 2017-05-09 DIAGNOSIS — E11.21 TYPE 2 DIABETES MELLITUS WITH DIABETIC NEPHROPATHY (HCC): ICD-10-CM

## 2017-05-09 DIAGNOSIS — N18.4 CHRONIC KIDNEY DISEASE, STAGE IV (SEVERE) (HCC): ICD-10-CM

## 2017-05-09 LAB
CREAT UR-MCNC: 23.3 MG/DL
PROT UR-MCNC: 45 MG/DL
PROT/CREAT UR: 1.93 MG/G{CREAT} (ref 0–0.1)
PTH-INTACT SERPL-MCNC: 41.1 PG/ML (ref 14–72)

## 2017-05-09 PROCEDURE — 84156 ASSAY OF PROTEIN URINE: CPT

## 2017-05-09 PROCEDURE — 82570 ASSAY OF URINE CREATININE: CPT

## 2017-05-09 PROCEDURE — 36415 COLL VENOUS BLD VENIPUNCTURE: CPT

## 2017-05-09 PROCEDURE — 83970 ASSAY OF PARATHORMONE: CPT

## 2017-05-19 ENCOUNTER — APPOINTMENT (OUTPATIENT)
Dept: LAB | Age: 52
End: 2017-05-19
Payer: MEDICARE

## 2017-05-19 ENCOUNTER — TRANSCRIBE ORDERS (OUTPATIENT)
Dept: ADMINISTRATIVE | Age: 52
End: 2017-05-19

## 2017-05-19 DIAGNOSIS — D64.9 ANEMIA: ICD-10-CM

## 2017-05-19 LAB
ERYTHROCYTE [DISTWIDTH] IN BLOOD BY AUTOMATED COUNT: 16.1 % (ref 11.6–15.1)
FERRITIN SERPL-MCNC: 35 NG/ML (ref 8–388)
HCT VFR BLD AUTO: 34.4 % (ref 34.8–46.1)
HGB BLD-MCNC: 10.8 G/DL (ref 11.5–15.4)
IRON SATN MFR SERPL: 12 %
IRON SERPL-MCNC: 32 UG/DL (ref 50–170)
MCH RBC QN AUTO: 28.7 PG (ref 26.8–34.3)
MCHC RBC AUTO-ENTMCNC: 31.4 G/DL (ref 31.4–37.4)
MCV RBC AUTO: 92 FL (ref 82–98)
PLATELET # BLD AUTO: 205 THOUSANDS/UL (ref 149–390)
PMV BLD AUTO: 13.1 FL (ref 8.9–12.7)
RBC # BLD AUTO: 3.76 MILLION/UL (ref 3.81–5.12)
TIBC SERPL-MCNC: 274 UG/DL (ref 250–450)
WBC # BLD AUTO: 8.67 THOUSAND/UL (ref 4.31–10.16)

## 2017-05-19 PROCEDURE — 85027 COMPLETE CBC AUTOMATED: CPT

## 2017-05-19 PROCEDURE — 82728 ASSAY OF FERRITIN: CPT

## 2017-05-19 PROCEDURE — 83550 IRON BINDING TEST: CPT

## 2017-05-19 PROCEDURE — 36415 COLL VENOUS BLD VENIPUNCTURE: CPT

## 2017-05-19 PROCEDURE — 83540 ASSAY OF IRON: CPT

## 2017-05-24 ENCOUNTER — GENERIC CONVERSION - ENCOUNTER (OUTPATIENT)
Dept: OTHER | Facility: OTHER | Age: 52
End: 2017-05-24

## 2017-06-01 DIAGNOSIS — D64.9 ANEMIA: ICD-10-CM

## 2017-06-01 DIAGNOSIS — Z12.11 ENCOUNTER FOR SCREENING FOR MALIGNANT NEOPLASM OF COLON: ICD-10-CM

## 2017-06-01 DIAGNOSIS — N25.81 SECONDARY HYPERPARATHYROIDISM OF RENAL ORIGIN (HCC): ICD-10-CM

## 2017-06-01 DIAGNOSIS — E11.21 TYPE 2 DIABETES MELLITUS WITH DIABETIC NEPHROPATHY (HCC): ICD-10-CM

## 2017-06-01 DIAGNOSIS — E87.2 ACIDOSIS: ICD-10-CM

## 2017-06-01 DIAGNOSIS — I10 ESSENTIAL (PRIMARY) HYPERTENSION: ICD-10-CM

## 2017-06-01 DIAGNOSIS — Z94.0 HISTORY OF KIDNEY TRANSPLANT: ICD-10-CM

## 2017-06-01 DIAGNOSIS — E03.9 HYPOTHYROIDISM: ICD-10-CM

## 2017-06-01 DIAGNOSIS — I12.9 HYPERTENSIVE CHRONIC KIDNEY DISEASE WITH STAGE 1 THROUGH STAGE 4 CHRONIC KIDNEY DISEASE, OR UNSPECIFIED CHRONIC KIDNEY DISEASE: ICD-10-CM

## 2017-06-01 DIAGNOSIS — R09.89 OTHER SPECIFIED SYMPTOMS AND SIGNS INVOLVING THE CIRCULATORY AND RESPIRATORY SYSTEMS: ICD-10-CM

## 2017-06-01 DIAGNOSIS — R80.9 PROTEINURIA: ICD-10-CM

## 2017-06-01 DIAGNOSIS — E10.49 TYPE 1 DIABETES MELLITUS WITH OTHER DIABETIC NEUROLOGICAL COMPLICATION (HCC): ICD-10-CM

## 2017-06-01 DIAGNOSIS — N18.4 CHRONIC KIDNEY DISEASE, STAGE IV (SEVERE) (HCC): ICD-10-CM

## 2017-06-02 ENCOUNTER — ALLSCRIPTS OFFICE VISIT (OUTPATIENT)
Dept: OTHER | Facility: OTHER | Age: 52
End: 2017-06-02

## 2017-06-02 ENCOUNTER — TRANSCRIBE ORDERS (OUTPATIENT)
Dept: ADMINISTRATIVE | Facility: HOSPITAL | Age: 52
End: 2017-06-02

## 2017-06-05 ENCOUNTER — HOSPITAL ENCOUNTER (OUTPATIENT)
Dept: INFUSION CENTER | Facility: HOSPITAL | Age: 52
Discharge: HOME | End: 2017-06-05
Payer: MEDICARE

## 2017-06-05 VITALS
HEIGHT: 68 IN | RESPIRATION RATE: 20 BRPM | TEMPERATURE: 98.6 F | HEART RATE: 68 BPM | WEIGHT: 218.03 LBS | DIASTOLIC BLOOD PRESSURE: 50 MMHG | SYSTOLIC BLOOD PRESSURE: 164 MMHG | BODY MASS INDEX: 33.04 KG/M2

## 2017-06-05 PROCEDURE — 96365 THER/PROPH/DIAG IV INF INIT: CPT

## 2017-06-05 PROCEDURE — 96366 THER/PROPH/DIAG IV INF ADDON: CPT

## 2017-06-05 RX ADMIN — IRON SUCROSE 300 MG: 20 INJECTION, SOLUTION INTRAVENOUS at 13:47

## 2017-06-06 ENCOUNTER — HOSPITAL ENCOUNTER (OUTPATIENT)
Dept: NON INVASIVE DIAGNOSTICS | Facility: CLINIC | Age: 52
Discharge: HOME | End: 2017-06-06
Payer: MEDICARE

## 2017-06-06 DIAGNOSIS — E87.2 ACIDOSIS: ICD-10-CM

## 2017-06-06 DIAGNOSIS — Z94.0 HISTORY OF KIDNEY TRANSPLANT: ICD-10-CM

## 2017-06-06 DIAGNOSIS — E11.21 TYPE 2 DIABETES MELLITUS WITH DIABETIC NEPHROPATHY (HCC): ICD-10-CM

## 2017-06-06 DIAGNOSIS — R80.9 PROTEINURIA: ICD-10-CM

## 2017-06-06 DIAGNOSIS — N18.4 CHRONIC KIDNEY DISEASE, STAGE IV (SEVERE) (HCC): ICD-10-CM

## 2017-06-06 DIAGNOSIS — N25.81 SECONDARY HYPERPARATHYROIDISM OF RENAL ORIGIN (HCC): ICD-10-CM

## 2017-06-06 DIAGNOSIS — I12.9 HYPERTENSIVE CHRONIC KIDNEY DISEASE WITH STAGE 1 THROUGH STAGE 4 CHRONIC KIDNEY DISEASE, OR UNSPECIFIED CHRONIC KIDNEY DISEASE: ICD-10-CM

## 2017-06-06 DIAGNOSIS — Z12.11 ENCOUNTER FOR SCREENING FOR MALIGNANT NEOPLASM OF COLON: ICD-10-CM

## 2017-06-06 PROCEDURE — 93975 VASCULAR STUDY: CPT

## 2017-06-09 ENCOUNTER — GENERIC CONVERSION - ENCOUNTER (OUTPATIENT)
Dept: OTHER | Facility: OTHER | Age: 52
End: 2017-06-09

## 2017-06-09 ENCOUNTER — APPOINTMENT (EMERGENCY)
Dept: RADIOLOGY | Facility: HOSPITAL | Age: 52
End: 2017-06-09
Payer: MEDICARE

## 2017-06-09 ENCOUNTER — HOSPITAL ENCOUNTER (EMERGENCY)
Facility: HOSPITAL | Age: 52
Discharge: HOME | End: 2017-06-09
Attending: EMERGENCY MEDICINE | Admitting: EMERGENCY MEDICINE
Payer: MEDICARE

## 2017-06-09 VITALS
SYSTOLIC BLOOD PRESSURE: 169 MMHG | RESPIRATION RATE: 18 BRPM | TEMPERATURE: 98.3 F | HEART RATE: 69 BPM | BODY MASS INDEX: 32.89 KG/M2 | DIASTOLIC BLOOD PRESSURE: 72 MMHG | WEIGHT: 215 LBS | OXYGEN SATURATION: 92 %

## 2017-06-09 DIAGNOSIS — R10.9 ABDOMINAL PAIN: Primary | ICD-10-CM

## 2017-06-09 LAB
ALBUMIN SERPL BCP-MCNC: 2.8 G/DL (ref 3.5–5)
ALP SERPL-CCNC: 104 U/L (ref 46–116)
ALT SERPL W P-5'-P-CCNC: 26 U/L (ref 12–78)
ANION GAP SERPL CALCULATED.3IONS-SCNC: 9 MMOL/L (ref 4–13)
AST SERPL W P-5'-P-CCNC: 22 U/L (ref 5–45)
BACTERIA UR QL AUTO: ABNORMAL /HPF
BASOPHILS # BLD AUTO: 0.05 THOUSANDS/ΜL (ref 0–0.1)
BASOPHILS NFR BLD AUTO: 1 % (ref 0–1)
BILIRUB SERPL-MCNC: 0.27 MG/DL (ref 0.2–1)
BILIRUB UR QL STRIP: NEGATIVE
BUN SERPL-MCNC: 38 MG/DL (ref 5–25)
CALCIUM SERPL-MCNC: 9.3 MG/DL (ref 8.3–10.1)
CHLORIDE SERPL-SCNC: 109 MMOL/L (ref 100–108)
CLARITY UR: CLEAR
CO2 SERPL-SCNC: 23 MMOL/L (ref 21–32)
COLOR UR: YELLOW
COLOR, POC: NORMAL
CREAT SERPL-MCNC: 1.7 MG/DL (ref 0.6–1.3)
EOSINOPHIL # BLD AUTO: 0.2 THOUSAND/ΜL (ref 0–0.61)
EOSINOPHIL NFR BLD AUTO: 2 % (ref 0–6)
ERYTHROCYTE [DISTWIDTH] IN BLOOD BY AUTOMATED COUNT: 17.5 % (ref 11.6–15.1)
GFR SERPL CREATININE-BSD FRML MDRD: 31.4 ML/MIN/1.73SQ M
GLUCOSE SERPL-MCNC: 117 MG/DL (ref 65–140)
GLUCOSE UR STRIP-MCNC: NEGATIVE MG/DL
HCT VFR BLD AUTO: 31.8 % (ref 34.8–46.1)
HGB BLD-MCNC: 10.2 G/DL (ref 11.5–15.4)
HGB UR QL STRIP.AUTO: ABNORMAL
KETONES UR STRIP-MCNC: NEGATIVE MG/DL
LEUKOCYTE ESTERASE UR QL STRIP: ABNORMAL
LIPASE SERPL-CCNC: 122 U/L (ref 73–393)
LYMPHOCYTES # BLD AUTO: 1.56 THOUSANDS/ΜL (ref 0.6–4.47)
LYMPHOCYTES NFR BLD AUTO: 15 % (ref 14–44)
MCH RBC QN AUTO: 28 PG (ref 26.8–34.3)
MCHC RBC AUTO-ENTMCNC: 32.1 G/DL (ref 31.4–37.4)
MCV RBC AUTO: 87 FL (ref 82–98)
MONOCYTES # BLD AUTO: 0.91 THOUSAND/ΜL (ref 0.17–1.22)
MONOCYTES NFR BLD AUTO: 9 % (ref 4–12)
NEUTROPHILS # BLD AUTO: 7.4 THOUSANDS/ΜL (ref 1.85–7.62)
NEUTS SEG NFR BLD AUTO: 73 % (ref 43–75)
NITRITE UR QL STRIP: POSITIVE
NON-SQ EPI CELLS URNS QL MICRO: ABNORMAL /HPF
NRBC BLD AUTO-RTO: 0 /100 WBCS
PH UR STRIP.AUTO: 7.5 [PH] (ref 4.5–8)
PLATELET # BLD AUTO: 181 THOUSANDS/UL (ref 149–390)
PMV BLD AUTO: 12.2 FL (ref 8.9–12.7)
POTASSIUM SERPL-SCNC: 4 MMOL/L (ref 3.5–5.3)
PROT SERPL-MCNC: 8 G/DL (ref 6.4–8.2)
PROT UR STRIP-MCNC: ABNORMAL MG/DL
RBC # BLD AUTO: 3.64 MILLION/UL (ref 3.81–5.12)
RBC #/AREA URNS AUTO: ABNORMAL /HPF
SODIUM SERPL-SCNC: 141 MMOL/L (ref 136–145)
SP GR UR STRIP.AUTO: 1.01 (ref 1–1.03)
SPECIMEN SOURCE: NORMAL
TROPONIN I BLD-MCNC: 0 NG/ML (ref 0–0.08)
UROBILINOGEN UR QL STRIP.AUTO: 0.2 E.U./DL
WBC # BLD AUTO: 10.19 THOUSAND/UL (ref 4.31–10.16)
WBC #/AREA URNS AUTO: ABNORMAL /HPF

## 2017-06-09 PROCEDURE — 99284 EMERGENCY DEPT VISIT MOD MDM: CPT

## 2017-06-09 PROCEDURE — 96374 THER/PROPH/DIAG INJ IV PUSH: CPT

## 2017-06-09 PROCEDURE — 84484 ASSAY OF TROPONIN QUANT: CPT

## 2017-06-09 PROCEDURE — 81001 URINALYSIS AUTO W/SCOPE: CPT

## 2017-06-09 PROCEDURE — 81002 URINALYSIS NONAUTO W/O SCOPE: CPT | Performed by: EMERGENCY MEDICINE

## 2017-06-09 PROCEDURE — 80053 COMPREHEN METABOLIC PANEL: CPT | Performed by: EMERGENCY MEDICINE

## 2017-06-09 PROCEDURE — 83690 ASSAY OF LIPASE: CPT | Performed by: EMERGENCY MEDICINE

## 2017-06-09 PROCEDURE — 93005 ELECTROCARDIOGRAM TRACING: CPT | Performed by: EMERGENCY MEDICINE

## 2017-06-09 PROCEDURE — 74176 CT ABD & PELVIS W/O CONTRAST: CPT

## 2017-06-09 PROCEDURE — 36415 COLL VENOUS BLD VENIPUNCTURE: CPT | Performed by: EMERGENCY MEDICINE

## 2017-06-09 PROCEDURE — 96361 HYDRATE IV INFUSION ADD-ON: CPT

## 2017-06-09 PROCEDURE — 85025 COMPLETE CBC W/AUTO DIFF WBC: CPT | Performed by: EMERGENCY MEDICINE

## 2017-06-09 RX ORDER — FENTANYL CITRATE 50 UG/ML
25 INJECTION, SOLUTION INTRAMUSCULAR; INTRAVENOUS ONCE
Status: COMPLETED | OUTPATIENT
Start: 2017-06-09 | End: 2017-06-09

## 2017-06-09 RX ADMIN — SODIUM CHLORIDE 1000 ML: 0.9 INJECTION, SOLUTION INTRAVENOUS at 19:59

## 2017-06-09 RX ADMIN — FENTANYL CITRATE 25 MCG: 50 INJECTION INTRAMUSCULAR; INTRAVENOUS at 19:57

## 2017-06-10 LAB
ATRIAL RATE: 65 BPM
P AXIS: -2 DEGREES
PR INTERVAL: 142 MS
QRS AXIS: -41 DEGREES
QRSD INTERVAL: 82 MS
QT INTERVAL: 418 MS
QTC INTERVAL: 434 MS
T WAVE AXIS: -1 DEGREES
VENTRICULAR RATE: 65 BPM

## 2017-06-12 ENCOUNTER — APPOINTMENT (OUTPATIENT)
Dept: LAB | Age: 52
DRG: 690 | End: 2017-06-12
Payer: MEDICARE

## 2017-06-12 ENCOUNTER — TRANSCRIBE ORDERS (OUTPATIENT)
Dept: ADMINISTRATIVE | Age: 52
End: 2017-06-12

## 2017-06-12 DIAGNOSIS — N18.4 CHRONIC KIDNEY DISEASE, STAGE IV (SEVERE) (HCC): ICD-10-CM

## 2017-06-12 DIAGNOSIS — D64.9 ANEMIA: ICD-10-CM

## 2017-06-12 LAB
ERYTHROCYTE [DISTWIDTH] IN BLOOD BY AUTOMATED COUNT: 17.4 % (ref 11.6–15.1)
FERRITIN SERPL-MCNC: 228 NG/ML (ref 8–388)
HCT VFR BLD AUTO: 33 % (ref 34.8–46.1)
HGB BLD-MCNC: 10.4 G/DL (ref 11.5–15.4)
IRON SATN MFR SERPL: 15 %
IRON SERPL-MCNC: 37 UG/DL (ref 50–170)
MCH RBC QN AUTO: 28 PG (ref 26.8–34.3)
MCHC RBC AUTO-ENTMCNC: 31.5 G/DL (ref 31.4–37.4)
MCV RBC AUTO: 89 FL (ref 82–98)
PLATELET # BLD AUTO: 191 THOUSANDS/UL (ref 149–390)
RBC # BLD AUTO: 3.72 MILLION/UL (ref 3.81–5.12)
TIBC SERPL-MCNC: 242 UG/DL (ref 250–450)
WBC # BLD AUTO: 7.02 THOUSAND/UL (ref 4.31–10.16)

## 2017-06-12 PROCEDURE — 83540 ASSAY OF IRON: CPT

## 2017-06-12 PROCEDURE — 85027 COMPLETE CBC AUTOMATED: CPT

## 2017-06-12 PROCEDURE — 83550 IRON BINDING TEST: CPT

## 2017-06-12 PROCEDURE — 82728 ASSAY OF FERRITIN: CPT

## 2017-06-12 PROCEDURE — 36415 COLL VENOUS BLD VENIPUNCTURE: CPT

## 2017-06-14 ENCOUNTER — APPOINTMENT (OUTPATIENT)
Dept: LAB | Age: 52
DRG: 690 | End: 2017-06-14
Payer: MEDICARE

## 2017-06-14 ENCOUNTER — GENERIC CONVERSION - ENCOUNTER (OUTPATIENT)
Dept: OTHER | Facility: OTHER | Age: 52
End: 2017-06-14

## 2017-06-14 ENCOUNTER — HOSPITAL ENCOUNTER (INPATIENT)
Facility: HOSPITAL | Age: 52
LOS: 3 days | Discharge: HOME | DRG: 690 | End: 2017-06-17
Attending: EMERGENCY MEDICINE | Admitting: INTERNAL MEDICINE
Payer: MEDICARE

## 2017-06-14 ENCOUNTER — TRANSCRIBE ORDERS (OUTPATIENT)
Dept: ADMINISTRATIVE | Age: 52
End: 2017-06-14

## 2017-06-14 ENCOUNTER — APPOINTMENT (EMERGENCY)
Dept: RADIOLOGY | Facility: HOSPITAL | Age: 52
DRG: 690 | End: 2017-06-14
Payer: MEDICARE

## 2017-06-14 DIAGNOSIS — D64.9 ANEMIA: Primary | ICD-10-CM

## 2017-06-14 DIAGNOSIS — Z94.0 RENAL TRANSPLANT RECIPIENT: ICD-10-CM

## 2017-06-14 DIAGNOSIS — N39.0 UTI (URINARY TRACT INFECTION): Primary | ICD-10-CM

## 2017-06-14 DIAGNOSIS — N12 PYELONEPHRITIS: ICD-10-CM

## 2017-06-14 PROBLEM — R53.1 WEAKNESS: Status: ACTIVE | Noted: 2017-06-14

## 2017-06-14 PROBLEM — R10.9 ABDOMINAL PAIN: Status: ACTIVE | Noted: 2017-06-14

## 2017-06-14 LAB
ANION GAP SERPL CALCULATED.3IONS-SCNC: 9 MMOL/L (ref 4–13)
BACTERIA UR QL AUTO: ABNORMAL /HPF
BASOPHILS # BLD AUTO: 0.04 THOUSANDS/ΜL (ref 0–0.1)
BASOPHILS NFR BLD AUTO: 0 % (ref 0–1)
BILIRUB UR QL STRIP: NEGATIVE
BUN SERPL-MCNC: 38 MG/DL (ref 5–25)
CALCIUM SERPL-MCNC: 9 MG/DL (ref 8.3–10.1)
CHLORIDE SERPL-SCNC: 106 MMOL/L (ref 100–108)
CLARITY UR: CLEAR
CO2 SERPL-SCNC: 22 MMOL/L (ref 21–32)
COLOR UR: YELLOW
CREAT SERPL-MCNC: 1.73 MG/DL (ref 0.6–1.3)
EOSINOPHIL # BLD AUTO: 0.18 THOUSAND/ΜL (ref 0–0.61)
EOSINOPHIL NFR BLD AUTO: 2 % (ref 0–6)
ERYTHROCYTE [DISTWIDTH] IN BLOOD BY AUTOMATED COUNT: 17.1 % (ref 11.6–15.1)
GFR SERPL CREATININE-BSD FRML MDRD: 30.8 ML/MIN/1.73SQ M
GLUCOSE SERPL-MCNC: 164 MG/DL (ref 65–140)
GLUCOSE SERPL-MCNC: 94 MG/DL (ref 65–140)
GLUCOSE UR STRIP-MCNC: NEGATIVE MG/DL
HCT VFR BLD AUTO: 33.8 % (ref 34.8–46.1)
HEMOCCULT STL QL IA: POSITIVE
HGB BLD-MCNC: 10.9 G/DL (ref 11.5–15.4)
HGB UR QL STRIP.AUTO: NEGATIVE
HYALINE CASTS #/AREA URNS LPF: ABNORMAL /LPF
KETONES UR STRIP-MCNC: NEGATIVE MG/DL
LEUKOCYTE ESTERASE UR QL STRIP: ABNORMAL
LYMPHOCYTES # BLD AUTO: 1.99 THOUSANDS/ΜL (ref 0.6–4.47)
LYMPHOCYTES NFR BLD AUTO: 19 % (ref 14–44)
MCH RBC QN AUTO: 28 PG (ref 26.8–34.3)
MCHC RBC AUTO-ENTMCNC: 32.2 G/DL (ref 31.4–37.4)
MCV RBC AUTO: 87 FL (ref 82–98)
MONOCYTES # BLD AUTO: 0.47 THOUSAND/ΜL (ref 0.17–1.22)
MONOCYTES NFR BLD AUTO: 5 % (ref 4–12)
NEUTROPHILS # BLD AUTO: 7.71 THOUSANDS/ΜL (ref 1.85–7.62)
NEUTS SEG NFR BLD AUTO: 74 % (ref 43–75)
NITRITE UR QL STRIP: POSITIVE
NON-SQ EPI CELLS URNS QL MICRO: ABNORMAL /HPF
NRBC BLD AUTO-RTO: 0 /100 WBCS
PH UR STRIP.AUTO: 6.5 [PH] (ref 4.5–8)
PLATELET # BLD AUTO: 190 THOUSANDS/UL (ref 149–390)
PMV BLD AUTO: 12.6 FL (ref 8.9–12.7)
POTASSIUM SERPL-SCNC: 4 MMOL/L (ref 3.5–5.3)
PROT UR STRIP-MCNC: ABNORMAL MG/DL
RBC # BLD AUTO: 3.89 MILLION/UL (ref 3.81–5.12)
RBC #/AREA URNS AUTO: ABNORMAL /HPF
SODIUM SERPL-SCNC: 137 MMOL/L (ref 136–145)
SP GR UR STRIP.AUTO: 1 (ref 1–1.03)
UROBILINOGEN UR QL STRIP.AUTO: 0.2 E.U./DL
WBC # BLD AUTO: 10.56 THOUSAND/UL (ref 4.31–10.16)
WBC #/AREA URNS AUTO: ABNORMAL /HPF

## 2017-06-14 PROCEDURE — 96375 TX/PRO/DX INJ NEW DRUG ADDON: CPT

## 2017-06-14 PROCEDURE — G0328 FECAL BLOOD SCRN IMMUNOASSAY: HCPCS

## 2017-06-14 PROCEDURE — 36415 COLL VENOUS BLD VENIPUNCTURE: CPT | Performed by: EMERGENCY MEDICINE

## 2017-06-14 PROCEDURE — 85025 COMPLETE CBC W/AUTO DIFF WBC: CPT | Performed by: EMERGENCY MEDICINE

## 2017-06-14 PROCEDURE — 80048 BASIC METABOLIC PNL TOTAL CA: CPT | Performed by: EMERGENCY MEDICINE

## 2017-06-14 PROCEDURE — 87040 BLOOD CULTURE FOR BACTERIA: CPT | Performed by: EMERGENCY MEDICINE

## 2017-06-14 PROCEDURE — 99285 EMERGENCY DEPT VISIT HI MDM: CPT

## 2017-06-14 PROCEDURE — 76770 US EXAM ABDO BACK WALL COMP: CPT

## 2017-06-14 PROCEDURE — 81001 URINALYSIS AUTO W/SCOPE: CPT | Performed by: EMERGENCY MEDICINE

## 2017-06-14 PROCEDURE — 87799 DETECT AGENT NOS DNA QUANT: CPT | Performed by: EMERGENCY MEDICINE

## 2017-06-14 PROCEDURE — 87077 CULTURE AEROBIC IDENTIFY: CPT | Performed by: EMERGENCY MEDICINE

## 2017-06-14 PROCEDURE — 87086 URINE CULTURE/COLONY COUNT: CPT | Performed by: EMERGENCY MEDICINE

## 2017-06-14 PROCEDURE — 96374 THER/PROPH/DIAG INJ IV PUSH: CPT

## 2017-06-14 PROCEDURE — 82948 REAGENT STRIP/BLOOD GLUCOSE: CPT

## 2017-06-14 PROCEDURE — 87186 SC STD MICRODIL/AGAR DIL: CPT | Performed by: EMERGENCY MEDICINE

## 2017-06-14 RX ORDER — TRAMADOL HYDROCHLORIDE 50 MG/1
50 TABLET ORAL EVERY 6 HOURS PRN
Status: DISCONTINUED | OUTPATIENT
Start: 2017-06-14 | End: 2017-06-17 | Stop reason: HOSPADM

## 2017-06-14 RX ORDER — FOLIC ACID 1 MG/1
1 TABLET ORAL 2 TIMES DAILY
Status: DISCONTINUED | OUTPATIENT
Start: 2017-06-14 | End: 2017-06-17 | Stop reason: HOSPADM

## 2017-06-14 RX ORDER — LEVOTHYROXINE SODIUM 88 UG/1
88 TABLET ORAL
Status: DISCONTINUED | OUTPATIENT
Start: 2017-06-15 | End: 2017-06-17 | Stop reason: HOSPADM

## 2017-06-14 RX ORDER — PRAVASTATIN SODIUM 80 MG/1
80 TABLET ORAL DAILY
Status: DISCONTINUED | OUTPATIENT
Start: 2017-06-15 | End: 2017-06-17 | Stop reason: HOSPADM

## 2017-06-14 RX ORDER — CEPHALEXIN 250 MG/1
500 CAPSULE ORAL ONCE
Status: COMPLETED | OUTPATIENT
Start: 2017-06-14 | End: 2017-06-14

## 2017-06-14 RX ORDER — AMLODIPINE BESYLATE 5 MG/1
5 TABLET ORAL 2 TIMES DAILY
Status: DISCONTINUED | OUTPATIENT
Start: 2017-06-14 | End: 2017-06-17 | Stop reason: HOSPADM

## 2017-06-14 RX ORDER — HYDRALAZINE HYDROCHLORIDE 50 MG/1
50 TABLET, FILM COATED ORAL 3 TIMES DAILY
Status: DISCONTINUED | OUTPATIENT
Start: 2017-06-14 | End: 2017-06-17 | Stop reason: HOSPADM

## 2017-06-14 RX ORDER — TACROLIMUS 1 MG/1
4 CAPSULE ORAL EVERY 12 HOURS
Status: DISCONTINUED | OUTPATIENT
Start: 2017-06-14 | End: 2017-06-17 | Stop reason: HOSPADM

## 2017-06-14 RX ORDER — DOXAZOSIN MESYLATE 1 MG/1
1 TABLET ORAL
Status: DISCONTINUED | OUTPATIENT
Start: 2017-06-14 | End: 2017-06-15

## 2017-06-14 RX ORDER — DULOXETIN HYDROCHLORIDE 60 MG/1
60 CAPSULE, DELAYED RELEASE ORAL 2 TIMES DAILY
Status: DISCONTINUED | OUTPATIENT
Start: 2017-06-14 | End: 2017-06-17 | Stop reason: HOSPADM

## 2017-06-14 RX ORDER — ROPINIROLE 0.25 MG/1
0.25 TABLET, FILM COATED ORAL 2 TIMES DAILY
Status: DISCONTINUED | OUTPATIENT
Start: 2017-06-14 | End: 2017-06-17 | Stop reason: HOSPADM

## 2017-06-14 RX ORDER — CLONIDINE HYDROCHLORIDE 0.1 MG/1
0.3 TABLET ORAL 2 TIMES DAILY
Status: DISCONTINUED | OUTPATIENT
Start: 2017-06-14 | End: 2017-06-17 | Stop reason: HOSPADM

## 2017-06-14 RX ORDER — HYDRALAZINE HYDROCHLORIDE 20 MG/ML
5 INJECTION INTRAMUSCULAR; INTRAVENOUS EVERY 6 HOURS PRN
Status: DISCONTINUED | OUTPATIENT
Start: 2017-06-14 | End: 2017-06-17 | Stop reason: HOSPADM

## 2017-06-14 RX ORDER — FENTANYL CITRATE 50 UG/ML
50 INJECTION, SOLUTION INTRAMUSCULAR; INTRAVENOUS ONCE
Status: COMPLETED | OUTPATIENT
Start: 2017-06-14 | End: 2017-06-14

## 2017-06-14 RX ORDER — LORAZEPAM 0.5 MG/1
2 TABLET ORAL 2 TIMES DAILY PRN
Status: DISCONTINUED | OUTPATIENT
Start: 2017-06-14 | End: 2017-06-17 | Stop reason: HOSPADM

## 2017-06-14 RX ORDER — SERTRALINE HYDROCHLORIDE 100 MG/1
100 TABLET, FILM COATED ORAL 2 TIMES DAILY
Status: DISCONTINUED | OUTPATIENT
Start: 2017-06-14 | End: 2017-06-17 | Stop reason: HOSPADM

## 2017-06-14 RX ORDER — ARIPIPRAZOLE 10 MG/1
20 TABLET ORAL DAILY
Status: DISCONTINUED | OUTPATIENT
Start: 2017-06-15 | End: 2017-06-17 | Stop reason: HOSPADM

## 2017-06-14 RX ORDER — HYDRALAZINE HYDROCHLORIDE 20 MG/ML
5 INJECTION INTRAMUSCULAR; INTRAVENOUS ONCE
Status: COMPLETED | OUTPATIENT
Start: 2017-06-14 | End: 2017-06-14

## 2017-06-14 RX ORDER — DOCUSATE SODIUM 100 MG/1
100 CAPSULE, LIQUID FILLED ORAL 2 TIMES DAILY
Status: DISCONTINUED | OUTPATIENT
Start: 2017-06-14 | End: 2017-06-17 | Stop reason: HOSPADM

## 2017-06-14 RX ORDER — ASPIRIN 81 MG/1
81 TABLET, CHEWABLE ORAL DAILY
Status: DISCONTINUED | OUTPATIENT
Start: 2017-06-15 | End: 2017-06-17 | Stop reason: HOSPADM

## 2017-06-14 RX ORDER — PANTOPRAZOLE SODIUM 40 MG/1
40 TABLET, DELAYED RELEASE ORAL DAILY
Status: DISCONTINUED | OUTPATIENT
Start: 2017-06-15 | End: 2017-06-17 | Stop reason: HOSPADM

## 2017-06-14 RX ORDER — FUROSEMIDE 20 MG/1
20 TABLET ORAL DAILY
Status: DISCONTINUED | OUTPATIENT
Start: 2017-06-15 | End: 2017-06-17 | Stop reason: HOSPADM

## 2017-06-14 RX ORDER — SODIUM BICARBONATE 650 MG/1
650 TABLET ORAL
Status: DISCONTINUED | OUTPATIENT
Start: 2017-06-14 | End: 2017-06-17 | Stop reason: HOSPADM

## 2017-06-14 RX ORDER — ACETAMINOPHEN 325 MG/1
650 TABLET ORAL EVERY 6 HOURS PRN
Status: DISCONTINUED | OUTPATIENT
Start: 2017-06-14 | End: 2017-06-17 | Stop reason: HOSPADM

## 2017-06-14 RX ORDER — HEPARIN SODIUM 5000 [USP'U]/ML
5000 INJECTION, SOLUTION INTRAVENOUS; SUBCUTANEOUS EVERY 8 HOURS SCHEDULED
Status: DISCONTINUED | OUTPATIENT
Start: 2017-06-14 | End: 2017-06-17 | Stop reason: HOSPADM

## 2017-06-14 RX ADMIN — HYDRALAZINE HYDROCHLORIDE 50 MG: 50 TABLET ORAL at 21:04

## 2017-06-14 RX ADMIN — ROPINIROLE 0.25 MG: 0.25 TABLET, FILM COATED ORAL at 19:35

## 2017-06-14 RX ADMIN — CEPHALEXIN 500 MG: 250 CAPSULE ORAL at 15:35

## 2017-06-14 RX ADMIN — AMLODIPINE BESYLATE 5 MG: 5 TABLET ORAL at 18:30

## 2017-06-14 RX ADMIN — SODIUM BICARBONATE 650 MG TABLET 650 MG: at 21:04

## 2017-06-14 RX ADMIN — DOCUSATE SODIUM 100 MG: 100 CAPSULE, LIQUID FILLED ORAL at 18:31

## 2017-06-14 RX ADMIN — HEPARIN SODIUM 5000 UNITS: 5000 INJECTION, SOLUTION INTRAVENOUS; SUBCUTANEOUS at 21:04

## 2017-06-14 RX ADMIN — TACROLIMUS 4 MG: 1 CAPSULE ORAL at 19:35

## 2017-06-14 RX ADMIN — SERTRALINE HYDROCHLORIDE 100 MG: 100 TABLET, FILM COATED ORAL at 18:30

## 2017-06-14 RX ADMIN — TRAZODONE HYDROCHLORIDE 150 MG: 100 TABLET ORAL at 21:03

## 2017-06-14 RX ADMIN — CEFTRIAXONE 1000 MG: 1 INJECTION, POWDER, FOR SOLUTION INTRAMUSCULAR; INTRAVENOUS at 16:08

## 2017-06-14 RX ADMIN — SODIUM BICARBONATE 650 MG TABLET 650 MG: at 18:30

## 2017-06-14 RX ADMIN — HYDRALAZINE HYDROCHLORIDE 5 MG: 20 INJECTION INTRAMUSCULAR; INTRAVENOUS at 17:11

## 2017-06-14 RX ADMIN — CLONIDINE HYDROCHLORIDE 0.3 MG: 0.1 TABLET ORAL at 18:29

## 2017-06-14 RX ADMIN — FENTANYL CITRATE 50 MCG: 50 INJECTION INTRAMUSCULAR; INTRAVENOUS at 12:12

## 2017-06-14 RX ADMIN — DULOXETINE HYDROCHLORIDE 60 MG: 60 CAPSULE, DELAYED RELEASE ORAL at 18:30

## 2017-06-14 RX ADMIN — METOPROLOL TARTRATE 50 MG: 25 TABLET ORAL at 18:30

## 2017-06-14 RX ADMIN — TRAMADOL HYDROCHLORIDE 50 MG: 50 TABLET, COATED ORAL at 19:35

## 2017-06-14 RX ADMIN — DOXAZOSIN 1 MG: 1 TABLET ORAL at 21:04

## 2017-06-14 RX ADMIN — FOLIC ACID 1 MG: 1 TABLET ORAL at 18:30

## 2017-06-15 LAB
ANION GAP SERPL CALCULATED.3IONS-SCNC: 9 MMOL/L (ref 4–13)
BUN SERPL-MCNC: 34 MG/DL (ref 5–25)
CALCIUM SERPL-MCNC: 9.1 MG/DL (ref 8.3–10.1)
CHLORIDE SERPL-SCNC: 110 MMOL/L (ref 100–108)
CO2 SERPL-SCNC: 21 MMOL/L (ref 21–32)
CREAT SERPL-MCNC: 1.63 MG/DL (ref 0.6–1.3)
ERYTHROCYTE [DISTWIDTH] IN BLOOD BY AUTOMATED COUNT: 17.2 % (ref 11.6–15.1)
GFR SERPL CREATININE-BSD FRML MDRD: 33 ML/MIN/1.73SQ M
GLUCOSE SERPL-MCNC: 108 MG/DL (ref 65–140)
GLUCOSE SERPL-MCNC: 119 MG/DL (ref 65–140)
GLUCOSE SERPL-MCNC: 123 MG/DL (ref 65–140)
GLUCOSE SERPL-MCNC: 124 MG/DL (ref 65–140)
GLUCOSE SERPL-MCNC: 181 MG/DL (ref 65–140)
GLUCOSE SERPL-MCNC: 220 MG/DL (ref 65–140)
HCT VFR BLD AUTO: 36.3 % (ref 34.8–46.1)
HGB BLD-MCNC: 11.4 G/DL (ref 11.5–15.4)
MAGNESIUM SERPL-MCNC: 2.2 MG/DL (ref 1.6–2.6)
MCH RBC QN AUTO: 27.7 PG (ref 26.8–34.3)
MCHC RBC AUTO-ENTMCNC: 31.4 G/DL (ref 31.4–37.4)
MCV RBC AUTO: 88 FL (ref 82–98)
PHOSPHATE SERPL-MCNC: 3.8 MG/DL (ref 2.7–4.5)
PLATELET # BLD AUTO: 217 THOUSANDS/UL (ref 149–390)
PMV BLD AUTO: 12.3 FL (ref 8.9–12.7)
POTASSIUM SERPL-SCNC: 4 MMOL/L (ref 3.5–5.3)
RBC # BLD AUTO: 4.11 MILLION/UL (ref 3.81–5.12)
SODIUM SERPL-SCNC: 140 MMOL/L (ref 136–145)
WBC # BLD AUTO: 9.19 THOUSAND/UL (ref 4.31–10.16)

## 2017-06-15 PROCEDURE — G8978 MOBILITY CURRENT STATUS: HCPCS

## 2017-06-15 PROCEDURE — G8980 MOBILITY D/C STATUS: HCPCS

## 2017-06-15 PROCEDURE — 83735 ASSAY OF MAGNESIUM: CPT | Performed by: PHYSICIAN ASSISTANT

## 2017-06-15 PROCEDURE — 85027 COMPLETE CBC AUTOMATED: CPT | Performed by: PHYSICIAN ASSISTANT

## 2017-06-15 PROCEDURE — 84100 ASSAY OF PHOSPHORUS: CPT | Performed by: PHYSICIAN ASSISTANT

## 2017-06-15 PROCEDURE — G8979 MOBILITY GOAL STATUS: HCPCS

## 2017-06-15 PROCEDURE — 82948 REAGENT STRIP/BLOOD GLUCOSE: CPT

## 2017-06-15 PROCEDURE — 97162 PT EVAL MOD COMPLEX 30 MIN: CPT

## 2017-06-15 PROCEDURE — 80048 BASIC METABOLIC PNL TOTAL CA: CPT | Performed by: PHYSICIAN ASSISTANT

## 2017-06-15 RX ORDER — DOXAZOSIN 2 MG/1
1 TABLET ORAL 2 TIMES DAILY
Status: DISCONTINUED | OUTPATIENT
Start: 2017-06-16 | End: 2017-06-16

## 2017-06-15 RX ORDER — DOXAZOSIN 2 MG/1
2 TABLET ORAL ONCE
Status: COMPLETED | OUTPATIENT
Start: 2017-06-15 | End: 2017-06-15

## 2017-06-15 RX ADMIN — FOLIC ACID 1 MG: 1 TABLET ORAL at 17:29

## 2017-06-15 RX ADMIN — DOXAZOSIN 2 MG: 2 TABLET ORAL at 14:56

## 2017-06-15 RX ADMIN — SODIUM BICARBONATE 650 MG TABLET 650 MG: at 07:21

## 2017-06-15 RX ADMIN — HYDRALAZINE HYDROCHLORIDE 50 MG: 50 TABLET ORAL at 21:22

## 2017-06-15 RX ADMIN — PRAVASTATIN SODIUM 80 MG: 80 TABLET ORAL at 09:24

## 2017-06-15 RX ADMIN — SERTRALINE HYDROCHLORIDE 100 MG: 100 TABLET, FILM COATED ORAL at 09:24

## 2017-06-15 RX ADMIN — TACROLIMUS 4 MG: 1 CAPSULE ORAL at 21:22

## 2017-06-15 RX ADMIN — SERTRALINE HYDROCHLORIDE 100 MG: 100 TABLET, FILM COATED ORAL at 17:29

## 2017-06-15 RX ADMIN — HYDRALAZINE HYDROCHLORIDE 5 MG: 20 INJECTION INTRAMUSCULAR; INTRAVENOUS at 15:50

## 2017-06-15 RX ADMIN — HEPARIN SODIUM 5000 UNITS: 5000 INJECTION, SOLUTION INTRAVENOUS; SUBCUTANEOUS at 05:17

## 2017-06-15 RX ADMIN — TRAZODONE HYDROCHLORIDE 150 MG: 100 TABLET ORAL at 21:23

## 2017-06-15 RX ADMIN — FOLIC ACID 1 MG: 1 TABLET ORAL at 09:25

## 2017-06-15 RX ADMIN — AMLODIPINE BESYLATE 5 MG: 5 TABLET ORAL at 09:24

## 2017-06-15 RX ADMIN — METOPROLOL TARTRATE 50 MG: 25 TABLET ORAL at 17:28

## 2017-06-15 RX ADMIN — SODIUM BICARBONATE 650 MG TABLET 650 MG: at 17:28

## 2017-06-15 RX ADMIN — CLONIDINE HYDROCHLORIDE 0.3 MG: 0.1 TABLET ORAL at 17:28

## 2017-06-15 RX ADMIN — ASPIRIN 81 MG 81 MG: 81 TABLET ORAL at 09:24

## 2017-06-15 RX ADMIN — HEPARIN SODIUM 5000 UNITS: 5000 INJECTION, SOLUTION INTRAVENOUS; SUBCUTANEOUS at 21:23

## 2017-06-15 RX ADMIN — ACETAMINOPHEN 650 MG: 325 TABLET, FILM COATED ORAL at 07:21

## 2017-06-15 RX ADMIN — ROPINIROLE 0.25 MG: 0.25 TABLET, FILM COATED ORAL at 09:26

## 2017-06-15 RX ADMIN — DULOXETINE HYDROCHLORIDE 60 MG: 60 CAPSULE, DELAYED RELEASE ORAL at 09:23

## 2017-06-15 RX ADMIN — SODIUM BICARBONATE 650 MG TABLET 650 MG: at 21:23

## 2017-06-15 RX ADMIN — HYDRALAZINE HYDROCHLORIDE 50 MG: 50 TABLET ORAL at 09:24

## 2017-06-15 RX ADMIN — TRAMADOL HYDROCHLORIDE 50 MG: 50 TABLET, COATED ORAL at 02:56

## 2017-06-15 RX ADMIN — LEVOTHYROXINE SODIUM 88 MCG: 88 TABLET ORAL at 05:17

## 2017-06-15 RX ADMIN — HYDRALAZINE HYDROCHLORIDE 50 MG: 50 TABLET ORAL at 17:27

## 2017-06-15 RX ADMIN — CLONIDINE HYDROCHLORIDE 0.3 MG: 0.1 TABLET ORAL at 09:23

## 2017-06-15 RX ADMIN — SODIUM BICARBONATE 650 MG TABLET 650 MG: at 12:17

## 2017-06-15 RX ADMIN — DOCUSATE SODIUM 100 MG: 100 CAPSULE, LIQUID FILLED ORAL at 09:25

## 2017-06-15 RX ADMIN — HEPARIN SODIUM 5000 UNITS: 5000 INJECTION, SOLUTION INTRAVENOUS; SUBCUTANEOUS at 14:56

## 2017-06-15 RX ADMIN — PANTOPRAZOLE SODIUM 40 MG: 40 TABLET, DELAYED RELEASE ORAL at 05:17

## 2017-06-15 RX ADMIN — ARIPIPRAZOLE 20 MG: 10 TABLET ORAL at 09:26

## 2017-06-15 RX ADMIN — TACROLIMUS 4 MG: 1 CAPSULE ORAL at 07:20

## 2017-06-15 RX ADMIN — METOPROLOL TARTRATE 50 MG: 25 TABLET ORAL at 09:23

## 2017-06-15 RX ADMIN — FUROSEMIDE 20 MG: 20 TABLET ORAL at 09:25

## 2017-06-15 RX ADMIN — DULOXETINE HYDROCHLORIDE 60 MG: 60 CAPSULE, DELAYED RELEASE ORAL at 17:27

## 2017-06-15 RX ADMIN — PREDNISONE 7.5 MG: 2.5 TABLET ORAL at 09:24

## 2017-06-15 RX ADMIN — ROPINIROLE 0.25 MG: 0.25 TABLET, FILM COATED ORAL at 17:29

## 2017-06-15 RX ADMIN — AMLODIPINE BESYLATE 5 MG: 5 TABLET ORAL at 17:29

## 2017-06-15 RX ADMIN — CEFTRIAXONE 1000 MG: 1 INJECTION, POWDER, FOR SOLUTION INTRAMUSCULAR; INTRAVENOUS at 14:56

## 2017-06-15 RX ADMIN — DOCUSATE SODIUM 100 MG: 100 CAPSULE, LIQUID FILLED ORAL at 17:29

## 2017-06-16 ENCOUNTER — GENERIC CONVERSION - ENCOUNTER (OUTPATIENT)
Dept: OTHER | Facility: OTHER | Age: 52
End: 2017-06-16

## 2017-06-16 LAB
ANION GAP SERPL CALCULATED.3IONS-SCNC: 8 MMOL/L (ref 4–13)
BACTERIA UR CULT: NORMAL
BUN SERPL-MCNC: 37 MG/DL (ref 5–25)
CALCIUM SERPL-MCNC: 9 MG/DL (ref 8.3–10.1)
CHLORIDE SERPL-SCNC: 108 MMOL/L (ref 100–108)
CO2 SERPL-SCNC: 25 MMOL/L (ref 21–32)
CREAT SERPL-MCNC: 1.63 MG/DL (ref 0.6–1.3)
ERYTHROCYTE [DISTWIDTH] IN BLOOD BY AUTOMATED COUNT: 17.1 % (ref 11.6–15.1)
GFR SERPL CREATININE-BSD FRML MDRD: 33 ML/MIN/1.73SQ M
GLUCOSE SERPL-MCNC: 106 MG/DL (ref 65–140)
GLUCOSE SERPL-MCNC: 134 MG/DL (ref 65–140)
GLUCOSE SERPL-MCNC: 135 MG/DL (ref 65–140)
GLUCOSE SERPL-MCNC: 152 MG/DL (ref 65–140)
GLUCOSE SERPL-MCNC: 170 MG/DL (ref 65–140)
HCT VFR BLD AUTO: 35.1 % (ref 34.8–46.1)
HGB BLD-MCNC: 11.1 G/DL (ref 11.5–15.4)
MCH RBC QN AUTO: 27.8 PG (ref 26.8–34.3)
MCHC RBC AUTO-ENTMCNC: 31.6 G/DL (ref 31.4–37.4)
MCV RBC AUTO: 88 FL (ref 82–98)
PLATELET # BLD AUTO: 229 THOUSANDS/UL (ref 149–390)
PMV BLD AUTO: 12.3 FL (ref 8.9–12.7)
POTASSIUM SERPL-SCNC: 3.6 MMOL/L (ref 3.5–5.3)
RBC # BLD AUTO: 4 MILLION/UL (ref 3.81–5.12)
SODIUM SERPL-SCNC: 141 MMOL/L (ref 136–145)
WBC # BLD AUTO: 7.77 THOUSAND/UL (ref 4.31–10.16)

## 2017-06-16 PROCEDURE — 80048 BASIC METABOLIC PNL TOTAL CA: CPT | Performed by: NURSE PRACTITIONER

## 2017-06-16 PROCEDURE — 82948 REAGENT STRIP/BLOOD GLUCOSE: CPT

## 2017-06-16 PROCEDURE — 85027 COMPLETE CBC AUTOMATED: CPT | Performed by: NURSE PRACTITIONER

## 2017-06-16 PROCEDURE — 80197 ASSAY OF TACROLIMUS: CPT | Performed by: INTERNAL MEDICINE

## 2017-06-16 RX ORDER — POLYETHYLENE GLYCOL 3350 17 G/17G
17 POWDER, FOR SOLUTION ORAL DAILY
Status: DISCONTINUED | OUTPATIENT
Start: 2017-06-16 | End: 2017-06-17 | Stop reason: HOSPADM

## 2017-06-16 RX ORDER — DOXAZOSIN 2 MG/1
2 TABLET ORAL 2 TIMES DAILY
Status: DISCONTINUED | OUTPATIENT
Start: 2017-06-16 | End: 2017-06-17 | Stop reason: HOSPADM

## 2017-06-16 RX ADMIN — SERTRALINE HYDROCHLORIDE 100 MG: 100 TABLET, FILM COATED ORAL at 08:47

## 2017-06-16 RX ADMIN — PRAVASTATIN SODIUM 80 MG: 80 TABLET ORAL at 08:48

## 2017-06-16 RX ADMIN — METOPROLOL TARTRATE 50 MG: 25 TABLET ORAL at 17:27

## 2017-06-16 RX ADMIN — SODIUM BICARBONATE 650 MG TABLET 650 MG: at 21:27

## 2017-06-16 RX ADMIN — HYDRALAZINE HYDROCHLORIDE 50 MG: 50 TABLET ORAL at 21:27

## 2017-06-16 RX ADMIN — TRAZODONE HYDROCHLORIDE 150 MG: 100 TABLET ORAL at 21:27

## 2017-06-16 RX ADMIN — SODIUM BICARBONATE 650 MG TABLET 650 MG: at 15:53

## 2017-06-16 RX ADMIN — AMLODIPINE BESYLATE 5 MG: 5 TABLET ORAL at 08:48

## 2017-06-16 RX ADMIN — FUROSEMIDE 20 MG: 20 TABLET ORAL at 08:48

## 2017-06-16 RX ADMIN — HEPARIN SODIUM 5000 UNITS: 5000 INJECTION, SOLUTION INTRAVENOUS; SUBCUTANEOUS at 05:40

## 2017-06-16 RX ADMIN — DULOXETINE HYDROCHLORIDE 60 MG: 60 CAPSULE, DELAYED RELEASE ORAL at 08:47

## 2017-06-16 RX ADMIN — METOPROLOL TARTRATE 50 MG: 25 TABLET ORAL at 08:48

## 2017-06-16 RX ADMIN — HEPARIN SODIUM 5000 UNITS: 5000 INJECTION, SOLUTION INTRAVENOUS; SUBCUTANEOUS at 21:27

## 2017-06-16 RX ADMIN — SODIUM BICARBONATE 650 MG TABLET 650 MG: at 07:39

## 2017-06-16 RX ADMIN — ROPINIROLE 0.25 MG: 0.25 TABLET, FILM COATED ORAL at 08:52

## 2017-06-16 RX ADMIN — LEVOTHYROXINE SODIUM 88 MCG: 88 TABLET ORAL at 05:40

## 2017-06-16 RX ADMIN — ARIPIPRAZOLE 20 MG: 10 TABLET ORAL at 08:52

## 2017-06-16 RX ADMIN — FOLIC ACID 1 MG: 1 TABLET ORAL at 17:27

## 2017-06-16 RX ADMIN — ASPIRIN 81 MG 81 MG: 81 TABLET ORAL at 08:48

## 2017-06-16 RX ADMIN — PANTOPRAZOLE SODIUM 40 MG: 40 TABLET, DELAYED RELEASE ORAL at 05:40

## 2017-06-16 RX ADMIN — SODIUM BICARBONATE 650 MG TABLET 650 MG: at 12:32

## 2017-06-16 RX ADMIN — POLYETHYLENE GLYCOL 3350 17 G: 17 POWDER, FOR SOLUTION ORAL at 12:32

## 2017-06-16 RX ADMIN — DOXAZOSIN 1 MG: 2 TABLET ORAL at 08:46

## 2017-06-16 RX ADMIN — DULOXETINE HYDROCHLORIDE 60 MG: 60 CAPSULE, DELAYED RELEASE ORAL at 17:27

## 2017-06-16 RX ADMIN — CEFTRIAXONE 1000 MG: 1 INJECTION, POWDER, FOR SOLUTION INTRAMUSCULAR; INTRAVENOUS at 15:01

## 2017-06-16 RX ADMIN — AMLODIPINE BESYLATE 5 MG: 5 TABLET ORAL at 17:26

## 2017-06-16 RX ADMIN — PREDNISONE 7.5 MG: 2.5 TABLET ORAL at 08:47

## 2017-06-16 RX ADMIN — TACROLIMUS 4 MG: 1 CAPSULE ORAL at 21:27

## 2017-06-16 RX ADMIN — HYDRALAZINE HYDROCHLORIDE 50 MG: 50 TABLET ORAL at 15:53

## 2017-06-16 RX ADMIN — DOXAZOSIN 2 MG: 2 TABLET ORAL at 17:38

## 2017-06-16 RX ADMIN — ROPINIROLE 0.25 MG: 0.25 TABLET, FILM COATED ORAL at 17:38

## 2017-06-16 RX ADMIN — DOCUSATE SODIUM 100 MG: 100 CAPSULE, LIQUID FILLED ORAL at 08:47

## 2017-06-16 RX ADMIN — CLONIDINE HYDROCHLORIDE 0.3 MG: 0.1 TABLET ORAL at 17:26

## 2017-06-16 RX ADMIN — FOLIC ACID 1 MG: 1 TABLET ORAL at 08:47

## 2017-06-16 RX ADMIN — TACROLIMUS 4 MG: 1 CAPSULE ORAL at 07:39

## 2017-06-16 RX ADMIN — CLONIDINE HYDROCHLORIDE 0.3 MG: 0.1 TABLET ORAL at 08:47

## 2017-06-16 RX ADMIN — HYDRALAZINE HYDROCHLORIDE 50 MG: 50 TABLET ORAL at 08:48

## 2017-06-16 RX ADMIN — HEPARIN SODIUM 5000 UNITS: 5000 INJECTION, SOLUTION INTRAVENOUS; SUBCUTANEOUS at 13:14

## 2017-06-16 RX ADMIN — SERTRALINE HYDROCHLORIDE 100 MG: 100 TABLET, FILM COATED ORAL at 17:26

## 2017-06-17 VITALS
DIASTOLIC BLOOD PRESSURE: 60 MMHG | TEMPERATURE: 98.2 F | WEIGHT: 233 LBS | SYSTOLIC BLOOD PRESSURE: 142 MMHG | BODY MASS INDEX: 35.31 KG/M2 | HEART RATE: 65 BPM | RESPIRATION RATE: 18 BRPM | HEIGHT: 68 IN | OXYGEN SATURATION: 94 %

## 2017-06-17 LAB
BKV DNA # SERPL NAA+PROBE: NEGATIVE COPIES/ML
BKV DNA SERPL NAA+PROBE-LOG#: NORMAL LOG10COPY/ML
GLUCOSE SERPL-MCNC: 182 MG/DL (ref 65–140)

## 2017-06-17 PROCEDURE — 82948 REAGENT STRIP/BLOOD GLUCOSE: CPT

## 2017-06-17 RX ORDER — CEPHALEXIN 500 MG/1
500 CAPSULE ORAL EVERY 12 HOURS SCHEDULED
Qty: 10 CAPSULE | Refills: 0 | Status: SHIPPED | OUTPATIENT
Start: 2017-06-17 | End: 2017-06-22

## 2017-06-17 RX ORDER — CEPHALEXIN 500 MG/1
500 CAPSULE ORAL EVERY 12 HOURS SCHEDULED
Status: DISCONTINUED | OUTPATIENT
Start: 2017-06-17 | End: 2017-06-17 | Stop reason: HOSPADM

## 2017-06-17 RX ADMIN — PRAVASTATIN SODIUM 80 MG: 80 TABLET ORAL at 08:55

## 2017-06-17 RX ADMIN — SODIUM BICARBONATE 650 MG TABLET 650 MG: at 11:59

## 2017-06-17 RX ADMIN — DOCUSATE SODIUM 100 MG: 100 CAPSULE, LIQUID FILLED ORAL at 08:53

## 2017-06-17 RX ADMIN — CLONIDINE HYDROCHLORIDE 0.3 MG: 0.1 TABLET ORAL at 08:54

## 2017-06-17 RX ADMIN — CEPHALEXIN 500 MG: 500 CAPSULE ORAL at 11:59

## 2017-06-17 RX ADMIN — ROPINIROLE 0.25 MG: 0.25 TABLET, FILM COATED ORAL at 08:55

## 2017-06-17 RX ADMIN — POLYETHYLENE GLYCOL 3350 17 G: 17 POWDER, FOR SOLUTION ORAL at 08:51

## 2017-06-17 RX ADMIN — AMLODIPINE BESYLATE 5 MG: 5 TABLET ORAL at 08:54

## 2017-06-17 RX ADMIN — DULOXETINE HYDROCHLORIDE 60 MG: 60 CAPSULE, DELAYED RELEASE ORAL at 08:51

## 2017-06-17 RX ADMIN — HEPARIN SODIUM 5000 UNITS: 5000 INJECTION, SOLUTION INTRAVENOUS; SUBCUTANEOUS at 06:12

## 2017-06-17 RX ADMIN — SERTRALINE HYDROCHLORIDE 100 MG: 100 TABLET, FILM COATED ORAL at 08:54

## 2017-06-17 RX ADMIN — TACROLIMUS 4 MG: 1 CAPSULE ORAL at 08:51

## 2017-06-17 RX ADMIN — LEVOTHYROXINE SODIUM 88 MCG: 88 TABLET ORAL at 06:12

## 2017-06-17 RX ADMIN — PANTOPRAZOLE SODIUM 40 MG: 40 TABLET, DELAYED RELEASE ORAL at 06:12

## 2017-06-17 RX ADMIN — ASPIRIN 81 MG 81 MG: 81 TABLET ORAL at 08:54

## 2017-06-17 RX ADMIN — ARIPIPRAZOLE 20 MG: 10 TABLET ORAL at 08:55

## 2017-06-17 RX ADMIN — FUROSEMIDE 20 MG: 20 TABLET ORAL at 08:54

## 2017-06-17 RX ADMIN — METOPROLOL TARTRATE 50 MG: 25 TABLET ORAL at 08:53

## 2017-06-17 RX ADMIN — PREDNISONE 7.5 MG: 2.5 TABLET ORAL at 08:52

## 2017-06-17 RX ADMIN — DOXAZOSIN 2 MG: 2 TABLET ORAL at 08:52

## 2017-06-17 RX ADMIN — FOLIC ACID 1 MG: 1 TABLET ORAL at 08:52

## 2017-06-17 RX ADMIN — SODIUM BICARBONATE 650 MG TABLET 650 MG: at 07:30

## 2017-06-17 RX ADMIN — HYDRALAZINE HYDROCHLORIDE 50 MG: 50 TABLET ORAL at 08:54

## 2017-06-18 ENCOUNTER — GENERIC CONVERSION - ENCOUNTER (OUTPATIENT)
Dept: OTHER | Facility: OTHER | Age: 52
End: 2017-06-18

## 2017-06-19 LAB
BACTERIA BLD CULT: NORMAL
BACTERIA BLD CULT: NORMAL
TACROLIMUS BLD LC/MS/MS-MCNC: 7.9 NG/ML (ref 2–20)

## 2017-06-20 ENCOUNTER — GENERIC CONVERSION - ENCOUNTER (OUTPATIENT)
Dept: OTHER | Facility: OTHER | Age: 52
End: 2017-06-20

## 2017-06-20 ENCOUNTER — HOSPITAL ENCOUNTER (OUTPATIENT)
Dept: NON INVASIVE DIAGNOSTICS | Facility: CLINIC | Age: 52
Discharge: HOME | End: 2017-06-20
Payer: MEDICARE

## 2017-06-20 DIAGNOSIS — R09.89 OTHER SPECIFIED SYMPTOMS AND SIGNS INVOLVING THE CIRCULATORY AND RESPIRATORY SYSTEMS: ICD-10-CM

## 2017-06-20 PROCEDURE — 93880 EXTRACRANIAL BILAT STUDY: CPT

## 2017-06-22 ENCOUNTER — ALLSCRIPTS OFFICE VISIT (OUTPATIENT)
Dept: OTHER | Facility: OTHER | Age: 52
End: 2017-06-22

## 2017-06-26 ENCOUNTER — APPOINTMENT (OUTPATIENT)
Dept: LAB | Age: 52
End: 2017-06-26
Payer: MEDICARE

## 2017-06-26 ENCOUNTER — GENERIC CONVERSION - ENCOUNTER (OUTPATIENT)
Dept: OTHER | Facility: OTHER | Age: 52
End: 2017-06-26

## 2017-06-26 ENCOUNTER — TRANSCRIBE ORDERS (OUTPATIENT)
Dept: ADMINISTRATIVE | Age: 52
End: 2017-06-26

## 2017-06-26 DIAGNOSIS — N18.4 CHRONIC KIDNEY DISEASE, STAGE IV (SEVERE) (HCC): ICD-10-CM

## 2017-06-26 DIAGNOSIS — R80.9 PROTEINURIA: ICD-10-CM

## 2017-06-26 DIAGNOSIS — N25.81 SECONDARY HYPERPARATHYROIDISM OF RENAL ORIGIN (HCC): ICD-10-CM

## 2017-06-26 DIAGNOSIS — R80.9 PROTEINURIA, UNSPECIFIED TYPE: ICD-10-CM

## 2017-06-26 DIAGNOSIS — E11.21 TYPE 2 DIABETES MELLITUS WITH DIABETIC NEPHROPATHY (HCC): ICD-10-CM

## 2017-06-26 DIAGNOSIS — I12.9 HYPERTENSIVE CHRONIC KIDNEY DISEASE WITH STAGE 1 THROUGH STAGE 4 CHRONIC KIDNEY DISEASE, OR UNSPECIFIED CHRONIC KIDNEY DISEASE: ICD-10-CM

## 2017-06-26 DIAGNOSIS — Z94.0 KIDNEY REPLACED BY TRANSPLANT: ICD-10-CM

## 2017-06-26 DIAGNOSIS — I12.9 PARENCHYMAL RENAL HYPERTENSION, STAGE 1-4 OR UNSPECIFIED CHRONIC KIDNEY DISEASE: Primary | ICD-10-CM

## 2017-06-26 DIAGNOSIS — E10.49 TYPE 1 DIABETES MELLITUS WITH OTHER DIABETIC NEUROLOGICAL COMPLICATION (HCC): ICD-10-CM

## 2017-06-26 DIAGNOSIS — E03.9 HYPOTHYROIDISM: ICD-10-CM

## 2017-06-26 DIAGNOSIS — Z12.11 ENCOUNTER FOR SCREENING FOR MALIGNANT NEOPLASM OF COLON: ICD-10-CM

## 2017-06-26 DIAGNOSIS — Z94.0 HISTORY OF KIDNEY TRANSPLANT: ICD-10-CM

## 2017-06-26 DIAGNOSIS — I10 ESSENTIAL (PRIMARY) HYPERTENSION: ICD-10-CM

## 2017-06-26 DIAGNOSIS — I12.9 PARENCHYMAL RENAL HYPERTENSION, STAGE 1-4 OR UNSPECIFIED CHRONIC KIDNEY DISEASE: ICD-10-CM

## 2017-06-26 DIAGNOSIS — E87.2 ACIDOSIS: ICD-10-CM

## 2017-06-26 LAB
ALBUMIN SERPL BCP-MCNC: 3.1 G/DL (ref 3.5–5)
ALP SERPL-CCNC: 122 U/L (ref 46–116)
ALT SERPL W P-5'-P-CCNC: 47 U/L (ref 12–78)
ANION GAP SERPL CALCULATED.3IONS-SCNC: 8 MMOL/L (ref 4–13)
AST SERPL W P-5'-P-CCNC: 30 U/L (ref 5–45)
BILIRUB SERPL-MCNC: 0.33 MG/DL (ref 0.2–1)
BUN SERPL-MCNC: 48 MG/DL (ref 5–25)
CALCIUM SERPL-MCNC: 9.3 MG/DL (ref 8.3–10.1)
CHLORIDE SERPL-SCNC: 109 MMOL/L (ref 100–108)
CHOLEST SERPL-MCNC: 140 MG/DL (ref 50–200)
CO2 SERPL-SCNC: 20 MMOL/L (ref 21–32)
CREAT SERPL-MCNC: 2.1 MG/DL (ref 0.6–1.3)
ERYTHROCYTE [DISTWIDTH] IN BLOOD BY AUTOMATED COUNT: 18.1 % (ref 11.6–15.1)
EST. AVERAGE GLUCOSE BLD GHB EST-MCNC: 137 MG/DL
GFR SERPL CREATININE-BSD FRML MDRD: 24.6 ML/MIN/1.73SQ M
GLUCOSE P FAST SERPL-MCNC: 141 MG/DL (ref 65–99)
HBA1C MFR BLD: 6.4 % (ref 4.2–6.3)
HCT VFR BLD AUTO: 38.8 % (ref 34.8–46.1)
HDLC SERPL-MCNC: 30 MG/DL (ref 40–60)
HGB BLD-MCNC: 12.5 G/DL (ref 11.5–15.4)
LDLC SERPL CALC-MCNC: 63 MG/DL (ref 0–100)
MAGNESIUM SERPL-MCNC: 2.6 MG/DL (ref 1.6–2.6)
MCH RBC QN AUTO: 28.5 PG (ref 26.8–34.3)
MCHC RBC AUTO-ENTMCNC: 32.2 G/DL (ref 31.4–37.4)
MCV RBC AUTO: 89 FL (ref 82–98)
PHOSPHATE SERPL-MCNC: 4.6 MG/DL (ref 2.7–4.5)
PLATELET # BLD AUTO: 227 THOUSANDS/UL (ref 149–390)
POTASSIUM SERPL-SCNC: 4.1 MMOL/L (ref 3.5–5.3)
PROT SERPL-MCNC: 8.8 G/DL (ref 6.4–8.2)
RBC # BLD AUTO: 4.38 MILLION/UL (ref 3.81–5.12)
SODIUM SERPL-SCNC: 137 MMOL/L (ref 136–145)
TRIGL SERPL-MCNC: 234 MG/DL
TSH SERPL DL<=0.05 MIU/L-ACNC: 2.87 UIU/ML (ref 0.36–3.74)
WBC # BLD AUTO: 9.07 THOUSAND/UL (ref 4.31–10.16)

## 2017-06-26 PROCEDURE — 80061 LIPID PANEL: CPT

## 2017-06-26 PROCEDURE — 84100 ASSAY OF PHOSPHORUS: CPT

## 2017-06-26 PROCEDURE — 80053 COMPREHEN METABOLIC PANEL: CPT

## 2017-06-26 PROCEDURE — 80197 ASSAY OF TACROLIMUS: CPT

## 2017-06-26 PROCEDURE — 84443 ASSAY THYROID STIM HORMONE: CPT

## 2017-06-26 PROCEDURE — 85027 COMPLETE CBC AUTOMATED: CPT

## 2017-06-26 PROCEDURE — 36415 COLL VENOUS BLD VENIPUNCTURE: CPT

## 2017-06-26 PROCEDURE — 83036 HEMOGLOBIN GLYCOSYLATED A1C: CPT

## 2017-06-26 PROCEDURE — 83735 ASSAY OF MAGNESIUM: CPT

## 2017-06-27 ENCOUNTER — GENERIC CONVERSION - ENCOUNTER (OUTPATIENT)
Dept: OTHER | Facility: OTHER | Age: 52
End: 2017-06-27

## 2017-06-28 ENCOUNTER — GENERIC CONVERSION - ENCOUNTER (OUTPATIENT)
Dept: OTHER | Facility: OTHER | Age: 52
End: 2017-06-28

## 2017-06-28 LAB — TACROLIMUS BLD LC/MS/MS-MCNC: 8.4 NG/ML (ref 2–20)

## 2017-06-29 ENCOUNTER — GENERIC CONVERSION - ENCOUNTER (OUTPATIENT)
Dept: OTHER | Facility: OTHER | Age: 52
End: 2017-06-29

## 2017-07-06 ENCOUNTER — ALLSCRIPTS OFFICE VISIT (OUTPATIENT)
Dept: OTHER | Facility: OTHER | Age: 52
End: 2017-07-06

## 2017-07-06 DIAGNOSIS — N18.4 CHRONIC KIDNEY DISEASE, STAGE IV (SEVERE) (HCC): ICD-10-CM

## 2017-07-06 DIAGNOSIS — Z94.0 HISTORY OF KIDNEY TRANSPLANT: ICD-10-CM

## 2017-07-07 ENCOUNTER — APPOINTMENT (OUTPATIENT)
Dept: LAB | Age: 52
End: 2017-07-07
Payer: MEDICARE

## 2017-07-07 DIAGNOSIS — Z94.0 HISTORY OF KIDNEY TRANSPLANT: ICD-10-CM

## 2017-07-07 LAB
BACTERIA UR QL AUTO: ABNORMAL /HPF
BILIRUB UR QL STRIP: NEGATIVE
CLARITY UR: ABNORMAL
COLOR UR: YELLOW
CREAT UR-MCNC: 39.9 MG/DL
GLUCOSE UR STRIP-MCNC: NEGATIVE MG/DL
HGB UR QL STRIP.AUTO: NEGATIVE
KETONES UR STRIP-MCNC: NEGATIVE MG/DL
LEUKOCYTE ESTERASE UR QL STRIP: ABNORMAL
NITRITE UR QL STRIP: POSITIVE
NON-SQ EPI CELLS URNS QL MICRO: ABNORMAL /HPF
PH UR STRIP.AUTO: 7.5 [PH] (ref 4.5–8)
PROT UR STRIP-MCNC: ABNORMAL MG/DL
PROT UR-MCNC: 99 MG/DL
PROT/CREAT UR: 2.48 MG/G{CREAT} (ref 0–0.1)
RBC #/AREA URNS AUTO: ABNORMAL /HPF
SP GR UR STRIP.AUTO: 1.01 (ref 1–1.03)
UROBILINOGEN UR QL STRIP.AUTO: 0.2 E.U./DL
WBC #/AREA URNS AUTO: ABNORMAL /HPF

## 2017-07-07 PROCEDURE — 81001 URINALYSIS AUTO W/SCOPE: CPT

## 2017-07-07 PROCEDURE — 84156 ASSAY OF PROTEIN URINE: CPT

## 2017-07-07 PROCEDURE — 82570 ASSAY OF URINE CREATININE: CPT

## 2017-07-08 DIAGNOSIS — N39.0 URINARY TRACT INFECTION: ICD-10-CM

## 2017-07-08 DIAGNOSIS — N18.4 CHRONIC KIDNEY DISEASE, STAGE IV (SEVERE) (HCC): ICD-10-CM

## 2017-07-08 DIAGNOSIS — N12 TUBULO-INTERSTITIAL NEPHRITIS: ICD-10-CM

## 2017-07-08 DIAGNOSIS — R80.9 PROTEINURIA: ICD-10-CM

## 2017-07-08 DIAGNOSIS — Z94.0 HISTORY OF KIDNEY TRANSPLANT: ICD-10-CM

## 2017-07-10 ENCOUNTER — TRANSCRIBE ORDERS (OUTPATIENT)
Dept: ADMINISTRATIVE | Age: 52
End: 2017-07-10

## 2017-07-10 ENCOUNTER — APPOINTMENT (OUTPATIENT)
Dept: LAB | Age: 52
End: 2017-07-10
Payer: MEDICARE

## 2017-07-10 DIAGNOSIS — Z94.0 KIDNEY REPLACED BY TRANSPLANT: Primary | ICD-10-CM

## 2017-07-10 DIAGNOSIS — Z94.0 KIDNEY REPLACED BY TRANSPLANT: ICD-10-CM

## 2017-07-10 DIAGNOSIS — Z94.0 HISTORY OF KIDNEY TRANSPLANT: ICD-10-CM

## 2017-07-10 LAB
25(OH)D3 SERPL-MCNC: 40.8 NG/ML (ref 30–100)
ANION GAP SERPL CALCULATED.3IONS-SCNC: 6 MMOL/L (ref 4–13)
BACTERIA UR QL AUTO: ABNORMAL /HPF
BILIRUB UR QL STRIP: NEGATIVE
BUN SERPL-MCNC: 33 MG/DL (ref 5–25)
CALCIUM SERPL-MCNC: 9.7 MG/DL (ref 8.3–10.1)
CHLORIDE SERPL-SCNC: 111 MMOL/L (ref 100–108)
CLARITY UR: ABNORMAL
CO2 SERPL-SCNC: 22 MMOL/L (ref 21–32)
COLOR UR: YELLOW
CREAT SERPL-MCNC: 1.47 MG/DL (ref 0.6–1.3)
CREAT UR-MCNC: 76.1 MG/DL
ERYTHROCYTE [DISTWIDTH] IN BLOOD BY AUTOMATED COUNT: 18.8 % (ref 11.6–15.1)
GFR SERPL CREATININE-BSD FRML MDRD: 37.2 ML/MIN/1.73SQ M
GLUCOSE P FAST SERPL-MCNC: 123 MG/DL (ref 65–99)
GLUCOSE UR STRIP-MCNC: NEGATIVE MG/DL
HCT VFR BLD AUTO: 38 % (ref 34.8–46.1)
HGB BLD-MCNC: 12.6 G/DL (ref 11.5–15.4)
HGB UR QL STRIP.AUTO: ABNORMAL
KETONES UR STRIP-MCNC: NEGATIVE MG/DL
LEUKOCYTE ESTERASE UR QL STRIP: ABNORMAL
MCH RBC QN AUTO: 29.1 PG (ref 26.8–34.3)
MCHC RBC AUTO-ENTMCNC: 33.2 G/DL (ref 31.4–37.4)
MCV RBC AUTO: 88 FL (ref 82–98)
MUCOUS THREADS UR QL AUTO: ABNORMAL
NITRITE UR QL STRIP: POSITIVE
NON-SQ EPI CELLS URNS QL MICRO: ABNORMAL /HPF
OTHER STN SPEC: ABNORMAL
PH UR STRIP.AUTO: 6.5 [PH] (ref 4.5–8)
PHOSPHATE SERPL-MCNC: 3.4 MG/DL (ref 2.7–4.5)
PLATELET # BLD AUTO: 188 THOUSANDS/UL (ref 149–390)
POTASSIUM SERPL-SCNC: 3.8 MMOL/L (ref 3.5–5.3)
PROT UR STRIP-MCNC: ABNORMAL MG/DL
PROT UR-MCNC: 194 MG/DL
PROT/CREAT UR: 2.55 MG/G{CREAT} (ref 0–0.1)
PTH-INTACT SERPL-MCNC: 10.8 PG/ML (ref 14–72)
RBC # BLD AUTO: 4.33 MILLION/UL (ref 3.81–5.12)
RBC #/AREA URNS AUTO: ABNORMAL /HPF
SODIUM SERPL-SCNC: 139 MMOL/L (ref 136–145)
SP GR UR STRIP.AUTO: 1.01 (ref 1–1.03)
UROBILINOGEN UR QL STRIP.AUTO: 0.2 E.U./DL
WBC # BLD AUTO: 11.12 THOUSAND/UL (ref 4.31–10.16)
WBC #/AREA URNS AUTO: ABNORMAL /HPF

## 2017-07-10 PROCEDURE — 82306 VITAMIN D 25 HYDROXY: CPT

## 2017-07-10 PROCEDURE — 82570 ASSAY OF URINE CREATININE: CPT | Performed by: INTERNAL MEDICINE

## 2017-07-10 PROCEDURE — 86664 EPSTEIN-BARR NUCLEAR ANTIGEN: CPT

## 2017-07-10 PROCEDURE — 80048 BASIC METABOLIC PNL TOTAL CA: CPT

## 2017-07-10 PROCEDURE — 83970 ASSAY OF PARATHORMONE: CPT

## 2017-07-10 PROCEDURE — 84100 ASSAY OF PHOSPHORUS: CPT

## 2017-07-10 PROCEDURE — 87799 DETECT AGENT NOS DNA QUANT: CPT

## 2017-07-10 PROCEDURE — 81001 URINALYSIS AUTO W/SCOPE: CPT

## 2017-07-10 PROCEDURE — 86665 EPSTEIN-BARR CAPSID VCA: CPT

## 2017-07-10 PROCEDURE — 36415 COLL VENOUS BLD VENIPUNCTURE: CPT

## 2017-07-10 PROCEDURE — 80197 ASSAY OF TACROLIMUS: CPT

## 2017-07-10 PROCEDURE — 85027 COMPLETE CBC AUTOMATED: CPT

## 2017-07-10 PROCEDURE — 84156 ASSAY OF PROTEIN URINE: CPT | Performed by: INTERNAL MEDICINE

## 2017-07-10 PROCEDURE — 86663 EPSTEIN-BARR ANTIBODY: CPT

## 2017-07-11 LAB
EBV EA IGG SER-ACNC: <9 U/ML (ref 0–8.9)
EBV NA IGG SER IA-ACNC: 27.1 U/ML (ref 0–17.9)
EBV PATRN SPEC IB-IMP: ABNORMAL
EBV VCA IGG SER IA-ACNC: >600 U/ML (ref 0–17.9)
EBV VCA IGM SER IA-ACNC: <36 U/ML (ref 0–35.9)

## 2017-07-12 LAB
BKV DNA # SERPL NAA+PROBE: NEGATIVE COPIES/ML
BKV DNA SERPL NAA+PROBE-LOG#: NORMAL LOG10COPY/ML
TACROLIMUS BLD LC/MS/MS-MCNC: 7.4 NG/ML (ref 2–20)

## 2017-07-13 ENCOUNTER — ALLSCRIPTS OFFICE VISIT (OUTPATIENT)
Dept: OTHER | Facility: OTHER | Age: 52
End: 2017-07-13

## 2017-07-17 ENCOUNTER — APPOINTMENT (OUTPATIENT)
Dept: LAB | Age: 52
End: 2017-07-17
Payer: MEDICARE

## 2017-07-17 DIAGNOSIS — Z94.0 KIDNEY REPLACED BY TRANSPLANT: ICD-10-CM

## 2017-07-17 PROCEDURE — 36415 COLL VENOUS BLD VENIPUNCTURE: CPT

## 2017-07-17 PROCEDURE — 80197 ASSAY OF TACROLIMUS: CPT

## 2017-07-18 ENCOUNTER — GENERIC CONVERSION - ENCOUNTER (OUTPATIENT)
Dept: OTHER | Facility: OTHER | Age: 52
End: 2017-07-18

## 2017-07-19 LAB — TACROLIMUS BLD LC/MS/MS-MCNC: 4.2 NG/ML (ref 2–20)

## 2017-07-25 ENCOUNTER — GENERIC CONVERSION - ENCOUNTER (OUTPATIENT)
Dept: OTHER | Facility: OTHER | Age: 52
End: 2017-07-25

## 2017-07-25 ENCOUNTER — TRANSCRIBE ORDERS (OUTPATIENT)
Dept: ADMINISTRATIVE | Age: 52
End: 2017-07-25

## 2017-07-25 ENCOUNTER — APPOINTMENT (OUTPATIENT)
Dept: LAB | Age: 52
End: 2017-07-25
Payer: MEDICARE

## 2017-07-25 DIAGNOSIS — N18.4 CHRONIC KIDNEY DISEASE, STAGE IV (SEVERE) (HCC): ICD-10-CM

## 2017-07-25 DIAGNOSIS — E21.1 HYPERPARATHYROIDISM DUE TO 1,25(0H)2D3 (HCC): ICD-10-CM

## 2017-07-25 DIAGNOSIS — I12.9 PARENCHYMAL RENAL HYPERTENSION, STAGE 1-4 OR UNSPECIFIED CHRONIC KIDNEY DISEASE: ICD-10-CM

## 2017-07-25 DIAGNOSIS — R80.9 PROTEINURIA, UNSPECIFIED TYPE: ICD-10-CM

## 2017-07-25 DIAGNOSIS — I12.9 PARENCHYMAL RENAL HYPERTENSION, STAGE 1-4 OR UNSPECIFIED CHRONIC KIDNEY DISEASE: Primary | ICD-10-CM

## 2017-07-25 DIAGNOSIS — Z94.0 KIDNEY REPLACED BY TRANSPLANT: ICD-10-CM

## 2017-07-25 LAB
ALBUMIN SERPL BCP-MCNC: 2.8 G/DL (ref 3.5–5)
ALP SERPL-CCNC: 115 U/L (ref 46–116)
ALT SERPL W P-5'-P-CCNC: 35 U/L (ref 12–78)
ANION GAP SERPL CALCULATED.3IONS-SCNC: 9 MMOL/L (ref 4–13)
AST SERPL W P-5'-P-CCNC: 16 U/L (ref 5–45)
BILIRUB SERPL-MCNC: 0.2 MG/DL (ref 0.2–1)
BUN SERPL-MCNC: 29 MG/DL (ref 5–25)
CALCIUM SERPL-MCNC: 9.6 MG/DL (ref 8.3–10.1)
CHLORIDE SERPL-SCNC: 107 MMOL/L (ref 100–108)
CO2 SERPL-SCNC: 21 MMOL/L (ref 21–32)
CREAT SERPL-MCNC: 1.5 MG/DL (ref 0.6–1.3)
ERYTHROCYTE [DISTWIDTH] IN BLOOD BY AUTOMATED COUNT: 19.5 % (ref 11.6–15.1)
GFR SERPL CREATININE-BSD FRML MDRD: 39 ML/MIN/1.73SQ M
GLUCOSE P FAST SERPL-MCNC: 169 MG/DL (ref 65–99)
HCT VFR BLD AUTO: 38.4 % (ref 34.8–46.1)
HGB BLD-MCNC: 12.5 G/DL (ref 11.5–15.4)
MAGNESIUM SERPL-MCNC: 2.3 MG/DL (ref 1.6–2.6)
MCH RBC QN AUTO: 29.4 PG (ref 26.8–34.3)
MCHC RBC AUTO-ENTMCNC: 32.6 G/DL (ref 31.4–37.4)
MCV RBC AUTO: 90 FL (ref 82–98)
PHOSPHATE SERPL-MCNC: 3.8 MG/DL (ref 2.7–4.5)
PLATELET # BLD AUTO: 228 THOUSANDS/UL (ref 149–390)
PMV BLD AUTO: 12.4 FL (ref 8.9–12.7)
POTASSIUM SERPL-SCNC: 4.2 MMOL/L (ref 3.5–5.3)
PROT SERPL-MCNC: 8.5 G/DL (ref 6.4–8.2)
RBC # BLD AUTO: 4.25 MILLION/UL (ref 3.81–5.12)
SODIUM SERPL-SCNC: 137 MMOL/L (ref 136–145)
WBC # BLD AUTO: 14.97 THOUSAND/UL (ref 4.31–10.16)

## 2017-07-25 PROCEDURE — 83735 ASSAY OF MAGNESIUM: CPT

## 2017-07-25 PROCEDURE — 36415 COLL VENOUS BLD VENIPUNCTURE: CPT

## 2017-07-25 PROCEDURE — 80197 ASSAY OF TACROLIMUS: CPT

## 2017-07-25 PROCEDURE — 85027 COMPLETE CBC AUTOMATED: CPT

## 2017-07-25 PROCEDURE — 84100 ASSAY OF PHOSPHORUS: CPT

## 2017-07-25 PROCEDURE — 80053 COMPREHEN METABOLIC PANEL: CPT

## 2017-07-27 LAB — TACROLIMUS BLD LC/MS/MS-MCNC: 4.2 NG/ML (ref 2–20)

## 2017-08-01 DIAGNOSIS — I10 ESSENTIAL (PRIMARY) HYPERTENSION: ICD-10-CM

## 2017-08-01 DIAGNOSIS — N25.81 SECONDARY HYPERPARATHYROIDISM OF RENAL ORIGIN (HCC): ICD-10-CM

## 2017-08-01 DIAGNOSIS — D47.2 MONOCLONAL GAMMOPATHY: ICD-10-CM

## 2017-08-01 DIAGNOSIS — N39.0 URINARY TRACT INFECTION: ICD-10-CM

## 2017-08-01 DIAGNOSIS — E87.2 ACIDOSIS: ICD-10-CM

## 2017-08-01 DIAGNOSIS — N18.9 CHRONIC KIDNEY DISEASE: ICD-10-CM

## 2017-08-01 DIAGNOSIS — E87.1 HYPO-OSMOLALITY AND HYPONATREMIA: ICD-10-CM

## 2017-08-01 DIAGNOSIS — N18.4 CHRONIC KIDNEY DISEASE, STAGE IV (SEVERE) (HCC): ICD-10-CM

## 2017-08-01 DIAGNOSIS — I12.9 HYPERTENSIVE CHRONIC KIDNEY DISEASE WITH STAGE 1 THROUGH STAGE 4 CHRONIC KIDNEY DISEASE, OR UNSPECIFIED CHRONIC KIDNEY DISEASE: ICD-10-CM

## 2017-08-01 DIAGNOSIS — E11.21 TYPE 2 DIABETES MELLITUS WITH DIABETIC NEPHROPATHY (HCC): ICD-10-CM

## 2017-08-01 DIAGNOSIS — N17.9 ACUTE KIDNEY FAILURE (HCC): ICD-10-CM

## 2017-08-01 DIAGNOSIS — Z94.0 HISTORY OF KIDNEY TRANSPLANT: ICD-10-CM

## 2017-08-01 DIAGNOSIS — R80.9 PROTEINURIA: ICD-10-CM

## 2017-08-01 DIAGNOSIS — D64.9 ANEMIA: ICD-10-CM

## 2017-08-01 DIAGNOSIS — Z12.11 ENCOUNTER FOR SCREENING FOR MALIGNANT NEOPLASM OF COLON: ICD-10-CM

## 2017-08-03 ENCOUNTER — GENERIC CONVERSION - ENCOUNTER (OUTPATIENT)
Dept: OTHER | Facility: OTHER | Age: 52
End: 2017-08-03

## 2017-08-08 ENCOUNTER — ALLSCRIPTS OFFICE VISIT (OUTPATIENT)
Dept: OTHER | Facility: OTHER | Age: 52
End: 2017-08-08

## 2017-08-09 ENCOUNTER — TRANSCRIBE ORDERS (OUTPATIENT)
Dept: ADMINISTRATIVE | Age: 52
End: 2017-08-09

## 2017-08-09 ENCOUNTER — APPOINTMENT (OUTPATIENT)
Dept: LAB | Age: 52
End: 2017-08-09
Payer: MEDICARE

## 2017-08-09 DIAGNOSIS — E87.1 HYPO-OSMOLALITY AND HYPONATREMIA: ICD-10-CM

## 2017-08-09 DIAGNOSIS — D64.9 ANEMIA: ICD-10-CM

## 2017-08-09 DIAGNOSIS — N18.4 CHRONIC KIDNEY DISEASE, STAGE IV (SEVERE) (HCC): Primary | ICD-10-CM

## 2017-08-09 DIAGNOSIS — N18.4 CHRONIC KIDNEY DISEASE, STAGE IV (SEVERE) (HCC): ICD-10-CM

## 2017-08-09 DIAGNOSIS — Z94.0 HISTORY OF KIDNEY TRANSPLANT: ICD-10-CM

## 2017-08-09 DIAGNOSIS — I10 ESSENTIAL (PRIMARY) HYPERTENSION: ICD-10-CM

## 2017-08-09 LAB
AMYLASE SERPL-CCNC: 54 IU/L (ref 25–115)
ANION GAP SERPL CALCULATED.3IONS-SCNC: 6 MMOL/L (ref 4–13)
BUN SERPL-MCNC: 44 MG/DL (ref 5–25)
C3 SERPL-MCNC: 110 MG/DL (ref 90–180)
C4 SERPL-MCNC: 31 MG/DL (ref 10–40)
CALCIUM SERPL-MCNC: 9.3 MG/DL (ref 8.3–10.1)
CHLORIDE SERPL-SCNC: 109 MMOL/L (ref 100–108)
CO2 SERPL-SCNC: 22 MMOL/L (ref 21–32)
CREAT SERPL-MCNC: 1.45 MG/DL (ref 0.6–1.3)
GFR SERPL CREATININE-BSD FRML MDRD: 41 ML/MIN/1.73SQ M
GLUCOSE P FAST SERPL-MCNC: 153 MG/DL (ref 65–99)
LIPASE SERPL-CCNC: 224 U/L (ref 73–393)
POTASSIUM SERPL-SCNC: 3.7 MMOL/L (ref 3.5–5.3)
SODIUM SERPL-SCNC: 137 MMOL/L (ref 136–145)

## 2017-08-09 PROCEDURE — 80048 BASIC METABOLIC PNL TOTAL CA: CPT

## 2017-08-09 PROCEDURE — 36415 COLL VENOUS BLD VENIPUNCTURE: CPT

## 2017-08-09 PROCEDURE — 86335 IMMUNFIX E-PHORSIS/URINE/CSF: CPT | Performed by: INTERNAL MEDICINE

## 2017-08-09 PROCEDURE — 83883 ASSAY NEPHELOMETRY NOT SPEC: CPT

## 2017-08-09 PROCEDURE — 86334 IMMUNOFIX E-PHORESIS SERUM: CPT

## 2017-08-09 PROCEDURE — 80197 ASSAY OF TACROLIMUS: CPT

## 2017-08-09 PROCEDURE — 86160 COMPLEMENT ANTIGEN: CPT

## 2017-08-09 PROCEDURE — 83690 ASSAY OF LIPASE: CPT

## 2017-08-09 PROCEDURE — 84681 ASSAY OF C-PEPTIDE: CPT

## 2017-08-09 PROCEDURE — 82150 ASSAY OF AMYLASE: CPT

## 2017-08-09 PROCEDURE — 84165 PROTEIN E-PHORESIS SERUM: CPT

## 2017-08-09 PROCEDURE — 84166 PROTEIN E-PHORESIS/URINE/CSF: CPT | Performed by: INTERNAL MEDICINE

## 2017-08-10 ENCOUNTER — ALLSCRIPTS OFFICE VISIT (OUTPATIENT)
Dept: OTHER | Facility: OTHER | Age: 52
End: 2017-08-10

## 2017-08-10 LAB
C PEPTIDE SERPL-MCNC: 4 NG/ML (ref 1.1–4.4)
KAPPA LC FREE SER-MCNC: 49.1 MG/L (ref 3.3–19.4)
KAPPA LC FREE/LAMBDA FREE SER: 2.67 {RATIO} (ref 0.26–1.65)
LAMBDA LC FREE SERPL-MCNC: 18.4 MG/L (ref 5.7–26.3)

## 2017-08-11 ENCOUNTER — GENERIC CONVERSION - ENCOUNTER (OUTPATIENT)
Dept: OTHER | Facility: OTHER | Age: 52
End: 2017-08-11

## 2017-08-11 LAB — TACROLIMUS BLD LC/MS/MS-MCNC: 4.3 NG/ML (ref 2–20)

## 2017-08-12 LAB
ALBUMIN SERPL ELPH-MCNC: 3.42 G/DL (ref 3.5–5)
ALBUMIN SERPL ELPH-MCNC: 42.2 % (ref 52–65)
ALBUMIN UR ELPH-MCNC: 32.7 %
ALPHA1 GLOB MFR UR ELPH: 17.8 %
ALPHA1 GLOB SERPL ELPH-MCNC: 0.33 G/DL (ref 0.1–0.4)
ALPHA1 GLOB SERPL ELPH-MCNC: 4.1 % (ref 2.5–5)
ALPHA2 GLOB MFR UR ELPH: 21.4 %
ALPHA2 GLOB SERPL ELPH-MCNC: 0.87 G/DL (ref 0.4–1.2)
ALPHA2 GLOB SERPL ELPH-MCNC: 10.7 % (ref 7–13)
B-GLOBULIN MFR UR ELPH: 18.6 %
BETA GLOB ABNORMAL SERPL ELPH-MCNC: 0.38 G/DL (ref 0.4–0.8)
BETA1 GLOB SERPL ELPH-MCNC: 4.7 % (ref 5–13)
BETA2 GLOB SERPL ELPH-MCNC: 8.9 % (ref 2–8)
BETA2+GAMMA GLOB SERPL ELPH-MCNC: 0.72 G/DL (ref 0.2–0.5)
GAMMA GLOB ABNORMAL SERPL ELPH-MCNC: 3.1 G/DL (ref 0.5–1.6)
GAMMA GLOB MFR UR ELPH: 9.5 %
GAMMA GLOB SERPL ELPH-MCNC: 38.3 % (ref 12–22)
IGG/ALB SER: 0.73 {RATIO} (ref 1.1–1.8)
INTERPRETATION UR IFE-IMP: NORMAL
INTERPRETATION UR IFE-IMP: NORMAL
M PEAK ID 2: 28 %
M PROTEIN 1 MFR SERPL ELPH: 6.7 %
M PROTEIN 1 SERPL ELPH-MCNC: 0.54 G/DL
M PROTEIN 2 SERPL ELPH-MCNC: 2.27 G/DL
M PROTEIN MFR UR ELPH: 4.31 MG/DL
M PROTEIN UR-MCNC: 7.3 %
PROT PATTERN SERPL ELPH-IMP: ABNORMAL
PROT PATTERN UR ELPH-IMP: ABNORMAL
PROT SERPL-MCNC: 8.1 G/DL (ref 6.4–8.2)
PROT UR-MCNC: 59 MG/DL

## 2017-08-14 ENCOUNTER — GENERIC CONVERSION - ENCOUNTER (OUTPATIENT)
Dept: OTHER | Facility: OTHER | Age: 52
End: 2017-08-14

## 2017-08-15 ENCOUNTER — GENERIC CONVERSION - ENCOUNTER (OUTPATIENT)
Dept: OTHER | Facility: OTHER | Age: 52
End: 2017-08-15

## 2017-08-18 ENCOUNTER — GENERIC CONVERSION - ENCOUNTER (OUTPATIENT)
Dept: OTHER | Facility: OTHER | Age: 52
End: 2017-08-18

## 2017-08-22 ENCOUNTER — APPOINTMENT (OUTPATIENT)
Dept: LAB | Age: 52
End: 2017-08-22
Payer: MEDICARE

## 2017-08-22 ENCOUNTER — TRANSCRIBE ORDERS (OUTPATIENT)
Dept: ADMINISTRATIVE | Age: 52
End: 2017-08-22

## 2017-08-22 DIAGNOSIS — Z94.0 KIDNEY REPLACED BY TRANSPLANT: Primary | ICD-10-CM

## 2017-08-22 DIAGNOSIS — R09.89 OTHER SYMPTOMS INVOLVING CARDIOVASCULAR SYSTEM: ICD-10-CM

## 2017-08-22 DIAGNOSIS — Z94.0 KIDNEY REPLACED BY TRANSPLANT: ICD-10-CM

## 2017-08-22 LAB
ANION GAP SERPL CALCULATED.3IONS-SCNC: 9 MMOL/L (ref 4–13)
BACTERIA UR QL AUTO: ABNORMAL /HPF
BILIRUB UR QL STRIP: NEGATIVE
BUN SERPL-MCNC: 32 MG/DL (ref 5–25)
CALCIUM SERPL-MCNC: 9.1 MG/DL (ref 8.3–10.1)
CHLORIDE SERPL-SCNC: 107 MMOL/L (ref 100–108)
CLARITY UR: ABNORMAL
CO2 SERPL-SCNC: 22 MMOL/L (ref 21–32)
COLOR UR: YELLOW
CREAT SERPL-MCNC: 1.78 MG/DL (ref 0.6–1.3)
CREAT UR-MCNC: 61.6 MG/DL
ERYTHROCYTE [DISTWIDTH] IN BLOOD BY AUTOMATED COUNT: 18.1 % (ref 11.6–15.1)
GFR SERPL CREATININE-BSD FRML MDRD: 32 ML/MIN/1.73SQ M
GLUCOSE P FAST SERPL-MCNC: 88 MG/DL (ref 65–99)
GLUCOSE UR STRIP-MCNC: NEGATIVE MG/DL
HCT VFR BLD AUTO: 35.6 % (ref 34.8–46.1)
HGB BLD-MCNC: 11.4 G/DL (ref 11.5–15.4)
HGB UR QL STRIP.AUTO: NEGATIVE
HYALINE CASTS #/AREA URNS LPF: ABNORMAL /LPF
KETONES UR STRIP-MCNC: NEGATIVE MG/DL
LEUKOCYTE ESTERASE UR QL STRIP: ABNORMAL
MCH RBC QN AUTO: 30.1 PG (ref 26.8–34.3)
MCHC RBC AUTO-ENTMCNC: 32 G/DL (ref 31.4–37.4)
MCV RBC AUTO: 94 FL (ref 82–98)
NITRITE UR QL STRIP: POSITIVE
NON-SQ EPI CELLS URNS QL MICRO: ABNORMAL /HPF
PH UR STRIP.AUTO: 7.5 [PH] (ref 4.5–8)
PLATELET # BLD AUTO: 218 THOUSANDS/UL (ref 149–390)
PMV BLD AUTO: 13.1 FL (ref 8.9–12.7)
POTASSIUM SERPL-SCNC: 4.1 MMOL/L (ref 3.5–5.3)
PROT UR STRIP-MCNC: ABNORMAL MG/DL
PROT UR-MCNC: 71 MG/DL
PROT/CREAT UR: 1.15 MG/G{CREAT} (ref 0–0.1)
RBC # BLD AUTO: 3.79 MILLION/UL (ref 3.81–5.12)
RBC #/AREA URNS AUTO: ABNORMAL /HPF
SODIUM SERPL-SCNC: 138 MMOL/L (ref 136–145)
SP GR UR STRIP.AUTO: 1.01 (ref 1–1.03)
UROBILINOGEN UR QL STRIP.AUTO: 0.2 E.U./DL
WBC # BLD AUTO: 8.52 THOUSAND/UL (ref 4.31–10.16)
WBC #/AREA URNS AUTO: ABNORMAL /HPF

## 2017-08-22 PROCEDURE — 80048 BASIC METABOLIC PNL TOTAL CA: CPT

## 2017-08-22 PROCEDURE — 80197 ASSAY OF TACROLIMUS: CPT

## 2017-08-22 PROCEDURE — 81001 URINALYSIS AUTO W/SCOPE: CPT | Performed by: INTERNAL MEDICINE

## 2017-08-22 PROCEDURE — 82570 ASSAY OF URINE CREATININE: CPT | Performed by: INTERNAL MEDICINE

## 2017-08-22 PROCEDURE — 36415 COLL VENOUS BLD VENIPUNCTURE: CPT

## 2017-08-22 PROCEDURE — 85027 COMPLETE CBC AUTOMATED: CPT

## 2017-08-22 PROCEDURE — 84156 ASSAY OF PROTEIN URINE: CPT | Performed by: INTERNAL MEDICINE

## 2017-08-23 ENCOUNTER — GENERIC CONVERSION - ENCOUNTER (OUTPATIENT)
Dept: OTHER | Facility: OTHER | Age: 52
End: 2017-08-23

## 2017-08-25 LAB — TACROLIMUS BLD LC/MS/MS-MCNC: 5.1 NG/ML (ref 2–20)

## 2017-08-28 ENCOUNTER — GENERIC CONVERSION - ENCOUNTER (OUTPATIENT)
Dept: OTHER | Facility: OTHER | Age: 52
End: 2017-08-28

## 2017-08-29 ENCOUNTER — APPOINTMENT (OUTPATIENT)
Dept: LAB | Age: 52
End: 2017-08-29
Payer: MEDICARE

## 2017-08-29 DIAGNOSIS — N39.0 URINARY TRACT INFECTION: ICD-10-CM

## 2017-08-29 LAB
BACTERIA UR QL AUTO: ABNORMAL /HPF
BILIRUB UR QL STRIP: NEGATIVE
CLARITY UR: ABNORMAL
COLOR UR: YELLOW
GLUCOSE UR STRIP-MCNC: NEGATIVE MG/DL
HGB UR QL STRIP.AUTO: NEGATIVE
HYALINE CASTS #/AREA URNS LPF: ABNORMAL /LPF
KETONES UR STRIP-MCNC: NEGATIVE MG/DL
LEUKOCYTE ESTERASE UR QL STRIP: ABNORMAL
NITRITE UR QL STRIP: POSITIVE
NON-SQ EPI CELLS URNS QL MICRO: ABNORMAL /HPF
PH UR STRIP.AUTO: 7 [PH] (ref 4.5–8)
PROT UR STRIP-MCNC: ABNORMAL MG/DL
RBC #/AREA URNS AUTO: ABNORMAL /HPF
SP GR UR STRIP.AUTO: 1.01 (ref 1–1.03)
UROBILINOGEN UR QL STRIP.AUTO: 0.2 E.U./DL
WBC #/AREA URNS AUTO: ABNORMAL /HPF

## 2017-08-29 PROCEDURE — 87086 URINE CULTURE/COLONY COUNT: CPT

## 2017-08-29 PROCEDURE — 87186 SC STD MICRODIL/AGAR DIL: CPT

## 2017-08-29 PROCEDURE — 87077 CULTURE AEROBIC IDENTIFY: CPT

## 2017-08-29 PROCEDURE — 81001 URINALYSIS AUTO W/SCOPE: CPT

## 2017-08-30 ENCOUNTER — ALLSCRIPTS OFFICE VISIT (OUTPATIENT)
Dept: OTHER | Facility: OTHER | Age: 52
End: 2017-08-30

## 2017-08-30 ENCOUNTER — GENERIC CONVERSION - ENCOUNTER (OUTPATIENT)
Dept: OTHER | Facility: OTHER | Age: 52
End: 2017-08-30

## 2017-08-31 ENCOUNTER — GENERIC CONVERSION - ENCOUNTER (OUTPATIENT)
Dept: OTHER | Facility: OTHER | Age: 52
End: 2017-08-31

## 2017-08-31 LAB — BACTERIA UR CULT: NORMAL

## 2017-09-01 ENCOUNTER — GENERIC CONVERSION - ENCOUNTER (OUTPATIENT)
Dept: OTHER | Facility: OTHER | Age: 52
End: 2017-09-01

## 2017-09-01 DIAGNOSIS — Z94.0 HISTORY OF KIDNEY TRANSPLANT: ICD-10-CM

## 2017-09-01 DIAGNOSIS — D72.829 ELEVATED WHITE BLOOD CELL COUNT: ICD-10-CM

## 2017-09-01 DIAGNOSIS — E03.9 HYPOTHYROIDISM: ICD-10-CM

## 2017-09-01 DIAGNOSIS — R80.9 PROTEINURIA: ICD-10-CM

## 2017-09-01 DIAGNOSIS — R35.0 FREQUENCY OF MICTURITION: ICD-10-CM

## 2017-09-01 DIAGNOSIS — I12.9 HYPERTENSIVE CHRONIC KIDNEY DISEASE WITH STAGE 1 THROUGH STAGE 4 CHRONIC KIDNEY DISEASE, OR UNSPECIFIED CHRONIC KIDNEY DISEASE: ICD-10-CM

## 2017-09-01 DIAGNOSIS — Z00.00 ENCOUNTER FOR GENERAL ADULT MEDICAL EXAMINATION WITHOUT ABNORMAL FINDINGS: ICD-10-CM

## 2017-09-01 DIAGNOSIS — N18.4 CHRONIC KIDNEY DISEASE, STAGE IV (SEVERE) (HCC): ICD-10-CM

## 2017-09-01 DIAGNOSIS — E11.21 TYPE 2 DIABETES MELLITUS WITH DIABETIC NEPHROPATHY (HCC): ICD-10-CM

## 2017-09-01 DIAGNOSIS — N39.0 URINARY TRACT INFECTION: ICD-10-CM

## 2017-09-01 DIAGNOSIS — N25.81 SECONDARY HYPERPARATHYROIDISM OF RENAL ORIGIN (HCC): ICD-10-CM

## 2017-09-01 DIAGNOSIS — N12 TUBULO-INTERSTITIAL NEPHRITIS: ICD-10-CM

## 2017-09-01 DIAGNOSIS — Z12.11 ENCOUNTER FOR SCREENING FOR MALIGNANT NEOPLASM OF COLON: ICD-10-CM

## 2017-09-01 DIAGNOSIS — E87.1 HYPO-OSMOLALITY AND HYPONATREMIA: ICD-10-CM

## 2017-09-01 DIAGNOSIS — E87.2 ACIDOSIS: ICD-10-CM

## 2017-09-12 ENCOUNTER — APPOINTMENT (OUTPATIENT)
Dept: RADIOLOGY | Age: 52
DRG: 690 | End: 2017-09-12
Payer: MEDICARE

## 2017-09-12 ENCOUNTER — TRANSCRIBE ORDERS (OUTPATIENT)
Dept: ADMINISTRATIVE | Age: 52
End: 2017-09-12

## 2017-09-12 DIAGNOSIS — D47.2 MONOCLONAL GAMMOPATHY: ICD-10-CM

## 2017-09-12 PROCEDURE — 77075 RADEX OSSEOUS SURVEY COMPL: CPT

## 2017-09-13 ENCOUNTER — APPOINTMENT (INPATIENT)
Dept: RADIOLOGY | Facility: HOSPITAL | Age: 52
DRG: 690 | End: 2017-09-13
Payer: MEDICARE

## 2017-09-13 ENCOUNTER — HOSPITAL ENCOUNTER (INPATIENT)
Facility: HOSPITAL | Age: 52
LOS: 3 days | Discharge: HOME | DRG: 690 | End: 2017-09-16
Attending: EMERGENCY MEDICINE | Admitting: HOSPITALIST
Payer: MEDICARE

## 2017-09-13 DIAGNOSIS — N39.0 UTI (URINARY TRACT INFECTION): ICD-10-CM

## 2017-09-13 DIAGNOSIS — N17.9 AKI (ACUTE KIDNEY INJURY) (HCC): ICD-10-CM

## 2017-09-13 DIAGNOSIS — Z94.0 RENAL TRANSPLANT RECIPIENT: ICD-10-CM

## 2017-09-13 DIAGNOSIS — N12 PYELONEPHRITIS: Primary | ICD-10-CM

## 2017-09-13 PROBLEM — R53.1 WEAKNESS: Status: RESOLVED | Noted: 2017-06-14 | Resolved: 2017-09-13

## 2017-09-13 LAB
ALBUMIN SERPL BCP-MCNC: 2.8 G/DL (ref 3.5–5)
ALP SERPL-CCNC: 125 U/L (ref 46–116)
ALT SERPL W P-5'-P-CCNC: 45 U/L (ref 12–78)
ANION GAP BLD CALC-SCNC: 13 MMOL/L (ref 4–13)
ANION GAP SERPL CALCULATED.3IONS-SCNC: 8 MMOL/L (ref 4–13)
AST SERPL W P-5'-P-CCNC: 46 U/L (ref 5–45)
BACTERIA UR QL AUTO: ABNORMAL /HPF
BASOPHILS # BLD AUTO: 0.07 THOUSANDS/ΜL (ref 0–0.1)
BASOPHILS NFR BLD AUTO: 1 % (ref 0–1)
BILIRUB SERPL-MCNC: 0.33 MG/DL (ref 0.2–1)
BILIRUB UR QL STRIP: NEGATIVE
BUN BLD-MCNC: 41 MG/DL (ref 5–25)
BUN SERPL-MCNC: 40 MG/DL (ref 5–25)
CA-I BLD-SCNC: 1.15 MMOL/L (ref 1.12–1.32)
CALCIUM SERPL-MCNC: 9 MG/DL (ref 8.3–10.1)
CHLORIDE BLD-SCNC: 107 MMOL/L (ref 100–108)
CHLORIDE SERPL-SCNC: 107 MMOL/L (ref 100–108)
CLARITY UR: CLEAR
CO2 SERPL-SCNC: 21 MMOL/L (ref 21–32)
COLOR UR: YELLOW
COLOR, POC: YELLOW
CREAT BLD-MCNC: 1.5 MG/DL (ref 0.6–1.3)
CREAT SERPL-MCNC: 1.67 MG/DL (ref 0.6–1.3)
EOSINOPHIL # BLD AUTO: 0.44 THOUSAND/ΜL (ref 0–0.61)
EOSINOPHIL NFR BLD AUTO: 4 % (ref 0–6)
ERYTHROCYTE [DISTWIDTH] IN BLOOD BY AUTOMATED COUNT: 17 % (ref 11.6–15.1)
GFR SERPL CREATININE-BSD FRML MDRD: 35 ML/MIN/1.73SQ M
GFR SERPL CREATININE-BSD FRML MDRD: 39 ML/MIN/1.73SQ M
GLUCOSE SERPL-MCNC: 100 MG/DL (ref 65–140)
GLUCOSE SERPL-MCNC: 103 MG/DL (ref 65–140)
GLUCOSE SERPL-MCNC: 170 MG/DL (ref 65–140)
GLUCOSE SERPL-MCNC: 67 MG/DL (ref 65–140)
GLUCOSE UR STRIP-MCNC: NEGATIVE MG/DL
HCT VFR BLD AUTO: 34.5 % (ref 34.8–46.1)
HCT VFR BLD CALC: 34 % (ref 34.8–46.1)
HGB BLD-MCNC: 11.3 G/DL (ref 11.5–15.4)
HGB BLDA-MCNC: 11.6 G/DL (ref 11.5–15.4)
HGB UR QL STRIP.AUTO: ABNORMAL
HYALINE CASTS #/AREA URNS LPF: ABNORMAL /LPF
KETONES UR STRIP-MCNC: NEGATIVE MG/DL
LEUKOCYTE ESTERASE UR QL STRIP: ABNORMAL
LYMPHOCYTES # BLD AUTO: 2.26 THOUSANDS/ΜL (ref 0.6–4.47)
LYMPHOCYTES NFR BLD AUTO: 20 % (ref 14–44)
MCH RBC QN AUTO: 30.5 PG (ref 26.8–34.3)
MCHC RBC AUTO-ENTMCNC: 32.8 G/DL (ref 31.4–37.4)
MCV RBC AUTO: 93 FL (ref 82–98)
MONOCYTES # BLD AUTO: 0.63 THOUSAND/ΜL (ref 0.17–1.22)
MONOCYTES NFR BLD AUTO: 6 % (ref 4–12)
NEUTROPHILS # BLD AUTO: 7.71 THOUSANDS/ΜL (ref 1.85–7.62)
NEUTS SEG NFR BLD AUTO: 69 % (ref 43–75)
NITRITE UR QL STRIP: NEGATIVE
NON-SQ EPI CELLS URNS QL MICRO: ABNORMAL /HPF
NRBC BLD AUTO-RTO: 0 /100 WBCS
PCO2 BLD: 24 MMOL/L (ref 21–32)
PH UR STRIP.AUTO: 6 [PH] (ref 4.5–8)
PLATELET # BLD AUTO: 216 THOUSANDS/UL (ref 149–390)
PMV BLD AUTO: 11.5 FL (ref 8.9–12.7)
POTASSIUM BLD-SCNC: 4.3 MMOL/L (ref 3.5–5.3)
POTASSIUM SERPL-SCNC: 4.9 MMOL/L (ref 3.5–5.3)
PROT SERPL-MCNC: 8.7 G/DL (ref 6.4–8.2)
PROT UR STRIP-MCNC: ABNORMAL MG/DL
RBC # BLD AUTO: 3.7 MILLION/UL (ref 3.81–5.12)
RBC #/AREA URNS AUTO: ABNORMAL /HPF
SODIUM BLD-SCNC: 139 MMOL/L (ref 136–145)
SODIUM SERPL-SCNC: 136 MMOL/L (ref 136–145)
SP GR UR STRIP.AUTO: 1.01 (ref 1–1.03)
SPECIMEN SOURCE: ABNORMAL
TSH SERPL DL<=0.05 MIU/L-ACNC: 4.2 UIU/ML (ref 0.36–3.74)
UROBILINOGEN UR QL STRIP.AUTO: 0.2 E.U./DL
WBC # BLD AUTO: 11.24 THOUSAND/UL (ref 4.31–10.16)
WBC #/AREA URNS AUTO: ABNORMAL /HPF

## 2017-09-13 PROCEDURE — 85025 COMPLETE CBC W/AUTO DIFF WBC: CPT | Performed by: EMERGENCY MEDICINE

## 2017-09-13 PROCEDURE — 36415 COLL VENOUS BLD VENIPUNCTURE: CPT | Performed by: EMERGENCY MEDICINE

## 2017-09-13 PROCEDURE — 82948 REAGENT STRIP/BLOOD GLUCOSE: CPT

## 2017-09-13 PROCEDURE — 84443 ASSAY THYROID STIM HORMONE: CPT | Performed by: EMERGENCY MEDICINE

## 2017-09-13 PROCEDURE — 80047 BASIC METABLC PNL IONIZED CA: CPT

## 2017-09-13 PROCEDURE — 99285 EMERGENCY DEPT VISIT HI MDM: CPT

## 2017-09-13 PROCEDURE — 96361 HYDRATE IV INFUSION ADD-ON: CPT

## 2017-09-13 PROCEDURE — 80053 COMPREHEN METABOLIC PANEL: CPT | Performed by: EMERGENCY MEDICINE

## 2017-09-13 PROCEDURE — 87186 SC STD MICRODIL/AGAR DIL: CPT

## 2017-09-13 PROCEDURE — 93976 VASCULAR STUDY: CPT

## 2017-09-13 PROCEDURE — 81002 URINALYSIS NONAUTO W/O SCOPE: CPT | Performed by: EMERGENCY MEDICINE

## 2017-09-13 PROCEDURE — 87086 URINE CULTURE/COLONY COUNT: CPT

## 2017-09-13 PROCEDURE — 85014 HEMATOCRIT: CPT

## 2017-09-13 PROCEDURE — 87040 BLOOD CULTURE FOR BACTERIA: CPT | Performed by: EMERGENCY MEDICINE

## 2017-09-13 PROCEDURE — 87077 CULTURE AEROBIC IDENTIFY: CPT

## 2017-09-13 PROCEDURE — 96374 THER/PROPH/DIAG INJ IV PUSH: CPT

## 2017-09-13 PROCEDURE — 76770 US EXAM ABDO BACK WALL COMP: CPT

## 2017-09-13 PROCEDURE — 81001 URINALYSIS AUTO W/SCOPE: CPT

## 2017-09-13 RX ORDER — SODIUM BICARBONATE 650 MG/1
650 TABLET ORAL DAILY
Status: DISCONTINUED | OUTPATIENT
Start: 2017-09-14 | End: 2017-09-16 | Stop reason: HOSPADM

## 2017-09-13 RX ORDER — INSULIN GLARGINE 100 [IU]/ML
20 INJECTION, SOLUTION SUBCUTANEOUS
Status: DISCONTINUED | OUTPATIENT
Start: 2017-09-13 | End: 2017-09-14

## 2017-09-13 RX ORDER — DOXAZOSIN MESYLATE 1 MG/1
1 TABLET ORAL
Status: DISCONTINUED | OUTPATIENT
Start: 2017-09-13 | End: 2017-09-16 | Stop reason: HOSPADM

## 2017-09-13 RX ORDER — ACETAMINOPHEN 325 MG/1
650 TABLET ORAL EVERY 6 HOURS PRN
Status: DISCONTINUED | OUTPATIENT
Start: 2017-09-13 | End: 2017-09-16 | Stop reason: HOSPADM

## 2017-09-13 RX ORDER — FOLIC ACID 1 MG/1
1 TABLET ORAL 2 TIMES DAILY
Status: DISCONTINUED | OUTPATIENT
Start: 2017-09-13 | End: 2017-09-16 | Stop reason: HOSPADM

## 2017-09-13 RX ORDER — LEVOTHYROXINE SODIUM 88 UG/1
88 TABLET ORAL
Status: DISCONTINUED | OUTPATIENT
Start: 2017-09-14 | End: 2017-09-16 | Stop reason: HOSPADM

## 2017-09-13 RX ORDER — TACROLIMUS 1 MG/1
4 CAPSULE ORAL DAILY
Status: DISCONTINUED | OUTPATIENT
Start: 2017-09-14 | End: 2017-09-16 | Stop reason: HOSPADM

## 2017-09-13 RX ORDER — LORAZEPAM 1 MG/1
2 TABLET ORAL 2 TIMES DAILY PRN
Status: DISCONTINUED | OUTPATIENT
Start: 2017-09-13 | End: 2017-09-16 | Stop reason: HOSPADM

## 2017-09-13 RX ORDER — CLONIDINE HYDROCHLORIDE 0.3 MG/1
0.3 TABLET ORAL EVERY 12 HOURS SCHEDULED
Status: DISCONTINUED | OUTPATIENT
Start: 2017-09-13 | End: 2017-09-16 | Stop reason: HOSPADM

## 2017-09-13 RX ORDER — AMLODIPINE BESYLATE 10 MG/1
10 TABLET ORAL DAILY
Status: DISCONTINUED | OUTPATIENT
Start: 2017-09-14 | End: 2017-09-16 | Stop reason: HOSPADM

## 2017-09-13 RX ORDER — AMLODIPINE BESYLATE 5 MG/1
5 TABLET ORAL
Status: DISCONTINUED | OUTPATIENT
Start: 2017-09-13 | End: 2017-09-16 | Stop reason: HOSPADM

## 2017-09-13 RX ORDER — OXYCODONE HYDROCHLORIDE 5 MG/1
5 TABLET ORAL EVERY 4 HOURS PRN
Status: DISCONTINUED | OUTPATIENT
Start: 2017-09-13 | End: 2017-09-16 | Stop reason: HOSPADM

## 2017-09-13 RX ORDER — PANTOPRAZOLE SODIUM 40 MG/1
40 TABLET, DELAYED RELEASE ORAL
Status: DISCONTINUED | OUTPATIENT
Start: 2017-09-14 | End: 2017-09-16 | Stop reason: HOSPADM

## 2017-09-13 RX ORDER — DULOXETIN HYDROCHLORIDE 30 MG/1
60 CAPSULE, DELAYED RELEASE ORAL 2 TIMES DAILY
Status: DISCONTINUED | OUTPATIENT
Start: 2017-09-13 | End: 2017-09-16 | Stop reason: HOSPADM

## 2017-09-13 RX ORDER — ASPIRIN 81 MG/1
81 TABLET, CHEWABLE ORAL DAILY
Status: DISCONTINUED | OUTPATIENT
Start: 2017-09-14 | End: 2017-09-16 | Stop reason: HOSPADM

## 2017-09-13 RX ORDER — ACETAMINOPHEN 325 MG/1
650 TABLET ORAL ONCE
Status: COMPLETED | OUTPATIENT
Start: 2017-09-13 | End: 2017-09-13

## 2017-09-13 RX ORDER — ROPINIROLE 0.25 MG/1
0.25 TABLET, FILM COATED ORAL 2 TIMES DAILY
Status: DISCONTINUED | OUTPATIENT
Start: 2017-09-13 | End: 2017-09-16 | Stop reason: HOSPADM

## 2017-09-13 RX ORDER — PRAVASTATIN SODIUM 80 MG/1
80 TABLET ORAL DAILY
Status: DISCONTINUED | OUTPATIENT
Start: 2017-09-14 | End: 2017-09-16 | Stop reason: HOSPADM

## 2017-09-13 RX ORDER — TACROLIMUS 1 MG/1
3 CAPSULE ORAL
Status: DISCONTINUED | OUTPATIENT
Start: 2017-09-13 | End: 2017-09-16 | Stop reason: HOSPADM

## 2017-09-13 RX ORDER — HEPARIN SODIUM 5000 [USP'U]/ML
5000 INJECTION, SOLUTION INTRAVENOUS; SUBCUTANEOUS EVERY 8 HOURS SCHEDULED
Status: DISCONTINUED | OUTPATIENT
Start: 2017-09-13 | End: 2017-09-16 | Stop reason: HOSPADM

## 2017-09-13 RX ORDER — AMLODIPINE BESYLATE 5 MG/1
5 TABLET ORAL DAILY
COMMUNITY
End: 2017-11-02 | Stop reason: HOSPADM

## 2017-09-13 RX ORDER — METOPROLOL TARTRATE 50 MG/1
50 TABLET, FILM COATED ORAL EVERY 12 HOURS SCHEDULED
Status: DISCONTINUED | OUTPATIENT
Start: 2017-09-13 | End: 2017-09-16 | Stop reason: HOSPADM

## 2017-09-13 RX ORDER — CLONIDINE HYDROCHLORIDE 0.2 MG/1
0.2 TABLET ORAL
Status: DISCONTINUED | OUTPATIENT
Start: 2017-09-14 | End: 2017-09-16 | Stop reason: HOSPADM

## 2017-09-13 RX ORDER — HYDRALAZINE HYDROCHLORIDE 50 MG/1
50 TABLET, FILM COATED ORAL EVERY 8 HOURS SCHEDULED
Status: DISCONTINUED | OUTPATIENT
Start: 2017-09-13 | End: 2017-09-16 | Stop reason: HOSPADM

## 2017-09-13 RX ORDER — ARIPIPRAZOLE 10 MG/1
20 TABLET ORAL DAILY
Status: DISCONTINUED | OUTPATIENT
Start: 2017-09-14 | End: 2017-09-16 | Stop reason: HOSPADM

## 2017-09-13 RX ORDER — CLONIDINE HYDROCHLORIDE 0.2 MG/1
0.2 TABLET ORAL DAILY
COMMUNITY
End: 2018-01-24

## 2017-09-13 RX ORDER — MELATONIN
3000 DAILY
Status: DISCONTINUED | OUTPATIENT
Start: 2017-09-14 | End: 2017-09-16 | Stop reason: HOSPADM

## 2017-09-13 RX ADMIN — METOPROLOL TARTRATE 50 MG: 50 TABLET ORAL at 21:16

## 2017-09-13 RX ADMIN — SODIUM CHLORIDE 1000 ML: 0.9 INJECTION, SOLUTION INTRAVENOUS at 17:18

## 2017-09-13 RX ADMIN — DULOXETINE HYDROCHLORIDE 60 MG: 30 CAPSULE, DELAYED RELEASE ORAL at 23:33

## 2017-09-13 RX ADMIN — HYDROMORPHONE HYDROCHLORIDE 0.5 MG: 1 INJECTION, SOLUTION INTRAMUSCULAR; INTRAVENOUS; SUBCUTANEOUS at 17:20

## 2017-09-13 RX ADMIN — ACETAMINOPHEN 650 MG: 325 TABLET, FILM COATED ORAL at 17:19

## 2017-09-13 RX ADMIN — HYDROMORPHONE HYDROCHLORIDE 0.5 MG: 1 INJECTION, SOLUTION INTRAMUSCULAR; INTRAVENOUS; SUBCUTANEOUS at 21:41

## 2017-09-13 RX ADMIN — OXYCODONE HYDROCHLORIDE 5 MG: 5 TABLET ORAL at 23:54

## 2017-09-13 RX ADMIN — SERTRALINE HYDROCHLORIDE 100 MG: 50 TABLET ORAL at 23:40

## 2017-09-13 RX ADMIN — AMLODIPINE BESYLATE 5 MG: 5 TABLET ORAL at 23:35

## 2017-09-13 RX ADMIN — ROPINIROLE 0.25 MG: 0.25 TABLET, FILM COATED ORAL at 23:33

## 2017-09-13 RX ADMIN — PIPERACILLIN SODIUM AND TAZOBACTAM SODIUM 4.5 G: 36; 4.5 INJECTION, POWDER, FOR SOLUTION INTRAVENOUS at 18:57

## 2017-09-13 RX ADMIN — LORAZEPAM 2 MG: 1 TABLET ORAL at 23:54

## 2017-09-13 RX ADMIN — HEPARIN SODIUM 5000 UNITS: 5000 INJECTION, SOLUTION INTRAVENOUS; SUBCUTANEOUS at 23:41

## 2017-09-13 RX ADMIN — DOXAZOSIN 1 MG: 1 TABLET ORAL at 23:33

## 2017-09-13 RX ADMIN — HYDRALAZINE HYDROCHLORIDE 50 MG: 50 TABLET, FILM COATED ORAL at 21:16

## 2017-09-13 RX ADMIN — TRAZODONE HYDROCHLORIDE 150 MG: 100 TABLET ORAL at 23:35

## 2017-09-13 RX ADMIN — TACROLIMUS 3 MG: 1 CAPSULE ORAL at 21:16

## 2017-09-13 RX ADMIN — FOLIC ACID 1 MG: 1 TABLET ORAL at 23:42

## 2017-09-14 ENCOUNTER — GENERIC CONVERSION - ENCOUNTER (OUTPATIENT)
Dept: OTHER | Facility: OTHER | Age: 52
End: 2017-09-14

## 2017-09-14 LAB
ANION GAP SERPL CALCULATED.3IONS-SCNC: 7 MMOL/L (ref 4–13)
BUN SERPL-MCNC: 38 MG/DL (ref 5–25)
CALCIUM SERPL-MCNC: 8.5 MG/DL (ref 8.3–10.1)
CHLORIDE SERPL-SCNC: 109 MMOL/L (ref 100–108)
CO2 SERPL-SCNC: 24 MMOL/L (ref 21–32)
CREAT SERPL-MCNC: 1.59 MG/DL (ref 0.6–1.3)
ERYTHROCYTE [DISTWIDTH] IN BLOOD BY AUTOMATED COUNT: 17.1 % (ref 11.6–15.1)
GFR SERPL CREATININE-BSD FRML MDRD: 37 ML/MIN/1.73SQ M
GLUCOSE SERPL-MCNC: 106 MG/DL (ref 65–140)
GLUCOSE SERPL-MCNC: 136 MG/DL (ref 65–140)
GLUCOSE SERPL-MCNC: 55 MG/DL (ref 65–140)
GLUCOSE SERPL-MCNC: 76 MG/DL (ref 65–140)
GLUCOSE SERPL-MCNC: 96 MG/DL (ref 65–140)
HCT VFR BLD AUTO: 32.4 % (ref 34.8–46.1)
HGB BLD-MCNC: 10.3 G/DL (ref 11.5–15.4)
MAGNESIUM SERPL-MCNC: 2.5 MG/DL (ref 1.6–2.6)
MCH RBC QN AUTO: 30.3 PG (ref 26.8–34.3)
MCHC RBC AUTO-ENTMCNC: 31.8 G/DL (ref 31.4–37.4)
MCV RBC AUTO: 95 FL (ref 82–98)
PHOSPHATE SERPL-MCNC: 4.6 MG/DL (ref 2.7–4.5)
PLATELET # BLD AUTO: 218 THOUSANDS/UL (ref 149–390)
PMV BLD AUTO: 12.3 FL (ref 8.9–12.7)
POTASSIUM SERPL-SCNC: 4 MMOL/L (ref 3.5–5.3)
RBC # BLD AUTO: 3.4 MILLION/UL (ref 3.81–5.12)
SODIUM SERPL-SCNC: 140 MMOL/L (ref 136–145)
WBC # BLD AUTO: 9.4 THOUSAND/UL (ref 4.31–10.16)

## 2017-09-14 PROCEDURE — 85027 COMPLETE CBC AUTOMATED: CPT | Performed by: HOSPITALIST

## 2017-09-14 PROCEDURE — 84100 ASSAY OF PHOSPHORUS: CPT | Performed by: HOSPITALIST

## 2017-09-14 PROCEDURE — 83735 ASSAY OF MAGNESIUM: CPT | Performed by: HOSPITALIST

## 2017-09-14 PROCEDURE — 80048 BASIC METABOLIC PNL TOTAL CA: CPT | Performed by: HOSPITALIST

## 2017-09-14 PROCEDURE — 82948 REAGENT STRIP/BLOOD GLUCOSE: CPT

## 2017-09-14 RX ORDER — LACTOBACILLUS ACIDOPHILUS / LACTOBACILLUS BULGARICUS 100 MILLION CFU STRENGTH
1 GRANULES ORAL
Status: DISCONTINUED | OUTPATIENT
Start: 2017-09-14 | End: 2017-09-16 | Stop reason: HOSPADM

## 2017-09-14 RX ORDER — ASCORBIC ACID 500 MG
500 TABLET ORAL DAILY
Status: DISCONTINUED | OUTPATIENT
Start: 2017-09-14 | End: 2017-09-16 | Stop reason: HOSPADM

## 2017-09-14 RX ORDER — DOCUSATE SODIUM 100 MG/1
200 CAPSULE, LIQUID FILLED ORAL 2 TIMES DAILY
Status: DISCONTINUED | OUTPATIENT
Start: 2017-09-14 | End: 2017-09-16 | Stop reason: HOSPADM

## 2017-09-14 RX ORDER — INSULIN GLARGINE 100 [IU]/ML
15 INJECTION, SOLUTION SUBCUTANEOUS
Status: DISCONTINUED | OUTPATIENT
Start: 2017-09-14 | End: 2017-09-16 | Stop reason: HOSPADM

## 2017-09-14 RX ADMIN — PANTOPRAZOLE SODIUM 40 MG: 40 TABLET, DELAYED RELEASE ORAL at 05:07

## 2017-09-14 RX ADMIN — INSULIN LISPRO 1 UNITS: 100 INJECTION, SOLUTION INTRAVENOUS; SUBCUTANEOUS at 00:00

## 2017-09-14 RX ADMIN — TACROLIMUS 4 MG: 1 CAPSULE ORAL at 08:15

## 2017-09-14 RX ADMIN — OXYCODONE HYDROCHLORIDE 5 MG: 5 TABLET ORAL at 08:15

## 2017-09-14 RX ADMIN — PRAVASTATIN SODIUM 80 MG: 80 TABLET ORAL at 08:16

## 2017-09-14 RX ADMIN — FOLIC ACID 1 MG: 1 TABLET ORAL at 17:37

## 2017-09-14 RX ADMIN — FOLIC ACID 1 MG: 1 TABLET ORAL at 08:17

## 2017-09-14 RX ADMIN — DOCUSATE SODIUM 200 MG: 100 CAPSULE, LIQUID FILLED ORAL at 13:30

## 2017-09-14 RX ADMIN — PREDNISONE 7.5 MG: 2.5 TABLET ORAL at 08:16

## 2017-09-14 RX ADMIN — LORAZEPAM 2 MG: 1 TABLET ORAL at 22:45

## 2017-09-14 RX ADMIN — CLONIDINE HYDROCHLORIDE 0.3 MG: 0.3 TABLET ORAL at 10:24

## 2017-09-14 RX ADMIN — LACTOBACILLUS ACIDOPHILUS / LACTOBACILLUS BULGARICUS 1 PACKET: 100 MILLION CFU STRENGTH GRANULES at 13:31

## 2017-09-14 RX ADMIN — ROPINIROLE 0.25 MG: 0.25 TABLET, FILM COATED ORAL at 08:16

## 2017-09-14 RX ADMIN — LEVOTHYROXINE SODIUM 88 MCG: 88 TABLET ORAL at 05:07

## 2017-09-14 RX ADMIN — ROPINIROLE 0.25 MG: 0.25 TABLET, FILM COATED ORAL at 17:37

## 2017-09-14 RX ADMIN — ASPIRIN 81 MG 81 MG: 81 TABLET ORAL at 08:16

## 2017-09-14 RX ADMIN — ARIPIPRAZOLE 20 MG: 10 TABLET ORAL at 08:16

## 2017-09-14 RX ADMIN — OXYCODONE HYDROCHLORIDE AND ACETAMINOPHEN 500 MG: 500 TABLET ORAL at 13:30

## 2017-09-14 RX ADMIN — AMLODIPINE BESYLATE 10 MG: 10 TABLET ORAL at 08:16

## 2017-09-14 RX ADMIN — DULOXETINE HYDROCHLORIDE 60 MG: 30 CAPSULE, DELAYED RELEASE ORAL at 08:16

## 2017-09-14 RX ADMIN — OXYCODONE HYDROCHLORIDE 5 MG: 5 TABLET ORAL at 02:25

## 2017-09-14 RX ADMIN — DULOXETINE HYDROCHLORIDE 60 MG: 30 CAPSULE, DELAYED RELEASE ORAL at 17:37

## 2017-09-14 RX ADMIN — SERTRALINE HYDROCHLORIDE 100 MG: 50 TABLET ORAL at 08:17

## 2017-09-14 RX ADMIN — HEPARIN SODIUM 5000 UNITS: 5000 INJECTION, SOLUTION INTRAVENOUS; SUBCUTANEOUS at 13:31

## 2017-09-14 RX ADMIN — SERTRALINE HYDROCHLORIDE 100 MG: 50 TABLET ORAL at 17:37

## 2017-09-14 RX ADMIN — INSULIN GLARGINE 20 UNITS: 100 INJECTION, SOLUTION SUBCUTANEOUS at 01:16

## 2017-09-14 RX ADMIN — SODIUM BICARBONATE 650 MG TABLET 650 MG: at 08:15

## 2017-09-14 RX ADMIN — HYDRALAZINE HYDROCHLORIDE 50 MG: 50 TABLET, FILM COATED ORAL at 13:32

## 2017-09-14 RX ADMIN — PIPERACILLIN SODIUM AND TAZOBACTAM SODIUM 3.38 G: 36; 4.5 INJECTION, POWDER, FOR SOLUTION INTRAVENOUS at 05:03

## 2017-09-14 RX ADMIN — HYDRALAZINE HYDROCHLORIDE 50 MG: 50 TABLET, FILM COATED ORAL at 06:54

## 2017-09-14 RX ADMIN — CLONIDINE HYDROCHLORIDE 0.3 MG: 0.3 TABLET ORAL at 00:23

## 2017-09-14 RX ADMIN — PIPERACILLIN SODIUM AND TAZOBACTAM SODIUM 3.38 G: 36; 4.5 INJECTION, POWDER, FOR SOLUTION INTRAVENOUS at 10:24

## 2017-09-14 RX ADMIN — METOPROLOL TARTRATE 50 MG: 50 TABLET ORAL at 08:16

## 2017-09-14 RX ADMIN — HEPARIN SODIUM 5000 UNITS: 5000 INJECTION, SOLUTION INTRAVENOUS; SUBCUTANEOUS at 05:07

## 2017-09-14 RX ADMIN — PIPERACILLIN SODIUM AND TAZOBACTAM SODIUM 3.38 G: 36; 4.5 INJECTION, POWDER, FOR SOLUTION INTRAVENOUS at 18:20

## 2017-09-14 RX ADMIN — VITAMIN D, TAB 1000IU (100/BT) 3000 UNITS: 25 TAB at 08:16

## 2017-09-15 LAB
ALBUMIN SERPL BCP-MCNC: 2.6 G/DL (ref 3.5–5)
ALP SERPL-CCNC: 142 U/L (ref 46–116)
ALT SERPL W P-5'-P-CCNC: 74 U/L (ref 12–78)
ANION GAP SERPL CALCULATED.3IONS-SCNC: 4 MMOL/L (ref 4–13)
AST SERPL W P-5'-P-CCNC: 36 U/L (ref 5–45)
BACTERIA UR CULT: NORMAL
BASOPHILS # BLD AUTO: 0.06 THOUSANDS/ΜL (ref 0–0.1)
BASOPHILS NFR BLD AUTO: 1 % (ref 0–1)
BILIRUB SERPL-MCNC: 0.32 MG/DL (ref 0.2–1)
BUN SERPL-MCNC: 36 MG/DL (ref 5–25)
CALCIUM SERPL-MCNC: 8.4 MG/DL (ref 8.3–10.1)
CHLORIDE SERPL-SCNC: 108 MMOL/L (ref 100–108)
CO2 SERPL-SCNC: 25 MMOL/L (ref 21–32)
CREAT SERPL-MCNC: 1.81 MG/DL (ref 0.6–1.3)
EOSINOPHIL # BLD AUTO: 0.43 THOUSAND/ΜL (ref 0–0.61)
EOSINOPHIL NFR BLD AUTO: 5 % (ref 0–6)
ERYTHROCYTE [DISTWIDTH] IN BLOOD BY AUTOMATED COUNT: 16.9 % (ref 11.6–15.1)
GFR SERPL CREATININE-BSD FRML MDRD: 31 ML/MIN/1.73SQ M
GLUCOSE SERPL-MCNC: 144 MG/DL (ref 65–140)
GLUCOSE SERPL-MCNC: 147 MG/DL (ref 65–140)
GLUCOSE SERPL-MCNC: 149 MG/DL (ref 65–140)
GLUCOSE SERPL-MCNC: 171 MG/DL (ref 65–140)
GLUCOSE SERPL-MCNC: 62 MG/DL (ref 65–140)
HCT VFR BLD AUTO: 31.9 % (ref 34.8–46.1)
HGB BLD-MCNC: 10.2 G/DL (ref 11.5–15.4)
LYMPHOCYTES # BLD AUTO: 1.78 THOUSANDS/ΜL (ref 0.6–4.47)
LYMPHOCYTES NFR BLD AUTO: 20 % (ref 14–44)
MAGNESIUM SERPL-MCNC: 2.4 MG/DL (ref 1.6–2.6)
MCH RBC QN AUTO: 29.9 PG (ref 26.8–34.3)
MCHC RBC AUTO-ENTMCNC: 32 G/DL (ref 31.4–37.4)
MCV RBC AUTO: 94 FL (ref 82–98)
MONOCYTES # BLD AUTO: 0.67 THOUSAND/ΜL (ref 0.17–1.22)
MONOCYTES NFR BLD AUTO: 7 % (ref 4–12)
NEUTROPHILS # BLD AUTO: 6.09 THOUSANDS/ΜL (ref 1.85–7.62)
NEUTS SEG NFR BLD AUTO: 67 % (ref 43–75)
NRBC BLD AUTO-RTO: 0 /100 WBCS
PHOSPHATE SERPL-MCNC: 4.3 MG/DL (ref 2.7–4.5)
PLATELET # BLD AUTO: 192 THOUSANDS/UL (ref 149–390)
PMV BLD AUTO: 11.3 FL (ref 8.9–12.7)
POTASSIUM SERPL-SCNC: 4 MMOL/L (ref 3.5–5.3)
PROT SERPL-MCNC: 7.9 G/DL (ref 6.4–8.2)
RBC # BLD AUTO: 3.41 MILLION/UL (ref 3.81–5.12)
SODIUM SERPL-SCNC: 137 MMOL/L (ref 136–145)
WBC # BLD AUTO: 9.09 THOUSAND/UL (ref 4.31–10.16)

## 2017-09-15 PROCEDURE — 82948 REAGENT STRIP/BLOOD GLUCOSE: CPT

## 2017-09-15 PROCEDURE — 80053 COMPREHEN METABOLIC PANEL: CPT | Performed by: NURSE PRACTITIONER

## 2017-09-15 PROCEDURE — 85025 COMPLETE CBC W/AUTO DIFF WBC: CPT | Performed by: NURSE PRACTITIONER

## 2017-09-15 PROCEDURE — 83735 ASSAY OF MAGNESIUM: CPT | Performed by: NURSE PRACTITIONER

## 2017-09-15 PROCEDURE — 84100 ASSAY OF PHOSPHORUS: CPT | Performed by: NURSE PRACTITIONER

## 2017-09-15 RX ORDER — FUROSEMIDE 20 MG/1
20 TABLET ORAL DAILY
Status: DISCONTINUED | OUTPATIENT
Start: 2017-09-15 | End: 2017-09-16 | Stop reason: HOSPADM

## 2017-09-15 RX ADMIN — TACROLIMUS 3 MG: 1 CAPSULE ORAL at 00:30

## 2017-09-15 RX ADMIN — ROPINIROLE 0.25 MG: 0.25 TABLET, FILM COATED ORAL at 08:37

## 2017-09-15 RX ADMIN — DOXAZOSIN 1 MG: 1 TABLET ORAL at 00:30

## 2017-09-15 RX ADMIN — HEPARIN SODIUM 5000 UNITS: 5000 INJECTION, SOLUTION INTRAVENOUS; SUBCUTANEOUS at 14:13

## 2017-09-15 RX ADMIN — SODIUM BICARBONATE 650 MG TABLET 650 MG: at 08:37

## 2017-09-15 RX ADMIN — ROPINIROLE 0.25 MG: 0.25 TABLET, FILM COATED ORAL at 17:36

## 2017-09-15 RX ADMIN — HYDRALAZINE HYDROCHLORIDE 50 MG: 50 TABLET, FILM COATED ORAL at 22:02

## 2017-09-15 RX ADMIN — CLONIDINE HYDROCHLORIDE 0.3 MG: 0.3 TABLET ORAL at 22:01

## 2017-09-15 RX ADMIN — DULOXETINE HYDROCHLORIDE 60 MG: 30 CAPSULE, DELAYED RELEASE ORAL at 17:33

## 2017-09-15 RX ADMIN — HYDRALAZINE HYDROCHLORIDE 50 MG: 50 TABLET, FILM COATED ORAL at 07:48

## 2017-09-15 RX ADMIN — TRAZODONE HYDROCHLORIDE 150 MG: 100 TABLET ORAL at 22:02

## 2017-09-15 RX ADMIN — INSULIN GLARGINE 15 UNITS: 100 INJECTION, SOLUTION SUBCUTANEOUS at 22:01

## 2017-09-15 RX ADMIN — HEPARIN SODIUM 5000 UNITS: 5000 INJECTION, SOLUTION INTRAVENOUS; SUBCUTANEOUS at 22:01

## 2017-09-15 RX ADMIN — AMLODIPINE BESYLATE 10 MG: 10 TABLET ORAL at 08:37

## 2017-09-15 RX ADMIN — CLONIDINE HYDROCHLORIDE 0.3 MG: 0.3 TABLET ORAL at 00:30

## 2017-09-15 RX ADMIN — INSULIN LISPRO 1 UNITS: 100 INJECTION, SOLUTION INTRAVENOUS; SUBCUTANEOUS at 17:34

## 2017-09-15 RX ADMIN — HEPARIN SODIUM 5000 UNITS: 5000 INJECTION, SOLUTION INTRAVENOUS; SUBCUTANEOUS at 06:55

## 2017-09-15 RX ADMIN — AMLODIPINE BESYLATE 5 MG: 5 TABLET ORAL at 00:29

## 2017-09-15 RX ADMIN — TRAZODONE HYDROCHLORIDE 150 MG: 100 TABLET ORAL at 00:29

## 2017-09-15 RX ADMIN — LACTOBACILLUS ACIDOPHILUS / LACTOBACILLUS BULGARICUS 1 PACKET: 100 MILLION CFU STRENGTH GRANULES at 17:34

## 2017-09-15 RX ADMIN — CLONIDINE HYDROCHLORIDE 0.3 MG: 0.3 TABLET ORAL at 08:38

## 2017-09-15 RX ADMIN — SERTRALINE HYDROCHLORIDE 100 MG: 50 TABLET ORAL at 17:33

## 2017-09-15 RX ADMIN — VITAMIN D, TAB 1000IU (100/BT) 3000 UNITS: 25 TAB at 08:38

## 2017-09-15 RX ADMIN — DOCUSATE SODIUM 200 MG: 100 CAPSULE, LIQUID FILLED ORAL at 08:37

## 2017-09-15 RX ADMIN — METOPROLOL TARTRATE 50 MG: 50 TABLET ORAL at 08:38

## 2017-09-15 RX ADMIN — HEPARIN SODIUM 5000 UNITS: 5000 INJECTION, SOLUTION INTRAVENOUS; SUBCUTANEOUS at 00:31

## 2017-09-15 RX ADMIN — PIPERACILLIN SODIUM AND TAZOBACTAM SODIUM 3.38 G: 36; 4.5 INJECTION, POWDER, FOR SOLUTION INTRAVENOUS at 11:48

## 2017-09-15 RX ADMIN — ASPIRIN 81 MG 81 MG: 81 TABLET ORAL at 08:39

## 2017-09-15 RX ADMIN — DOCUSATE SODIUM 200 MG: 100 CAPSULE, LIQUID FILLED ORAL at 17:33

## 2017-09-15 RX ADMIN — SERTRALINE HYDROCHLORIDE 100 MG: 50 TABLET ORAL at 08:37

## 2017-09-15 RX ADMIN — DOXAZOSIN 1 MG: 1 TABLET ORAL at 22:02

## 2017-09-15 RX ADMIN — TACROLIMUS 4 MG: 1 CAPSULE ORAL at 08:37

## 2017-09-15 RX ADMIN — AMLODIPINE BESYLATE 5 MG: 5 TABLET ORAL at 22:01

## 2017-09-15 RX ADMIN — OXYCODONE HYDROCHLORIDE AND ACETAMINOPHEN 500 MG: 500 TABLET ORAL at 08:38

## 2017-09-15 RX ADMIN — LACTOBACILLUS ACIDOPHILUS / LACTOBACILLUS BULGARICUS 1 PACKET: 100 MILLION CFU STRENGTH GRANULES at 08:36

## 2017-09-15 RX ADMIN — HYDRALAZINE HYDROCHLORIDE 50 MG: 50 TABLET, FILM COATED ORAL at 14:13

## 2017-09-15 RX ADMIN — CEFAZOLIN SODIUM 1000 MG: 1 SOLUTION INTRAVENOUS at 14:13

## 2017-09-15 RX ADMIN — PANTOPRAZOLE SODIUM 40 MG: 40 TABLET, DELAYED RELEASE ORAL at 06:55

## 2017-09-15 RX ADMIN — METOPROLOL TARTRATE 50 MG: 50 TABLET ORAL at 22:01

## 2017-09-15 RX ADMIN — INSULIN GLARGINE 15 UNITS: 100 INJECTION, SOLUTION SUBCUTANEOUS at 00:30

## 2017-09-15 RX ADMIN — PIPERACILLIN SODIUM AND TAZOBACTAM SODIUM 3.38 G: 36; 4.5 INJECTION, POWDER, FOR SOLUTION INTRAVENOUS at 03:34

## 2017-09-15 RX ADMIN — PRAVASTATIN SODIUM 80 MG: 80 TABLET ORAL at 08:38

## 2017-09-15 RX ADMIN — PREDNISONE 7.5 MG: 2.5 TABLET ORAL at 08:38

## 2017-09-15 RX ADMIN — FOLIC ACID 1 MG: 1 TABLET ORAL at 08:37

## 2017-09-15 RX ADMIN — FOLIC ACID 1 MG: 1 TABLET ORAL at 17:33

## 2017-09-15 RX ADMIN — LACTOBACILLUS ACIDOPHILUS / LACTOBACILLUS BULGARICUS 1 PACKET: 100 MILLION CFU STRENGTH GRANULES at 11:49

## 2017-09-15 RX ADMIN — ARIPIPRAZOLE 20 MG: 10 TABLET ORAL at 08:36

## 2017-09-15 RX ADMIN — LEVOTHYROXINE SODIUM 88 MCG: 88 TABLET ORAL at 06:55

## 2017-09-15 RX ADMIN — TACROLIMUS 3 MG: 1 CAPSULE ORAL at 22:02

## 2017-09-15 RX ADMIN — CLONIDINE HYDROCHLORIDE 0.2 MG: 0.2 TABLET ORAL at 11:48

## 2017-09-15 RX ADMIN — METOPROLOL TARTRATE 50 MG: 50 TABLET ORAL at 00:30

## 2017-09-15 RX ADMIN — DULOXETINE HYDROCHLORIDE 60 MG: 30 CAPSULE, DELAYED RELEASE ORAL at 08:37

## 2017-09-15 RX ADMIN — FUROSEMIDE 20 MG: 20 TABLET ORAL at 11:48

## 2017-09-15 RX ADMIN — HYDRALAZINE HYDROCHLORIDE 50 MG: 50 TABLET, FILM COATED ORAL at 00:30

## 2017-09-16 VITALS
WEIGHT: 220 LBS | TEMPERATURE: 97.7 F | OXYGEN SATURATION: 96 % | BODY MASS INDEX: 34.53 KG/M2 | DIASTOLIC BLOOD PRESSURE: 56 MMHG | HEIGHT: 67 IN | RESPIRATION RATE: 20 BRPM | SYSTOLIC BLOOD PRESSURE: 160 MMHG | HEART RATE: 70 BPM

## 2017-09-16 LAB
ANION GAP SERPL CALCULATED.3IONS-SCNC: 5 MMOL/L (ref 4–13)
BUN SERPL-MCNC: 36 MG/DL (ref 5–25)
CALCIUM SERPL-MCNC: 8.6 MG/DL (ref 8.3–10.1)
CHLORIDE SERPL-SCNC: 110 MMOL/L (ref 100–108)
CO2 SERPL-SCNC: 25 MMOL/L (ref 21–32)
CREAT SERPL-MCNC: 1.61 MG/DL (ref 0.6–1.3)
ERYTHROCYTE [DISTWIDTH] IN BLOOD BY AUTOMATED COUNT: 16.9 % (ref 11.6–15.1)
GFR SERPL CREATININE-BSD FRML MDRD: 36 ML/MIN/1.73SQ M
GLUCOSE SERPL-MCNC: 103 MG/DL (ref 65–140)
GLUCOSE SERPL-MCNC: 182 MG/DL (ref 65–140)
GLUCOSE SERPL-MCNC: 62 MG/DL (ref 65–140)
GLUCOSE SERPL-MCNC: 74 MG/DL (ref 65–140)
HCT VFR BLD AUTO: 31 % (ref 34.8–46.1)
HGB BLD-MCNC: 10.1 G/DL (ref 11.5–15.4)
MCH RBC QN AUTO: 30.5 PG (ref 26.8–34.3)
MCHC RBC AUTO-ENTMCNC: 32.6 G/DL (ref 31.4–37.4)
MCV RBC AUTO: 94 FL (ref 82–98)
PLATELET # BLD AUTO: 191 THOUSANDS/UL (ref 149–390)
PMV BLD AUTO: 12.2 FL (ref 8.9–12.7)
POTASSIUM SERPL-SCNC: 3.7 MMOL/L (ref 3.5–5.3)
RBC # BLD AUTO: 3.31 MILLION/UL (ref 3.81–5.12)
SODIUM SERPL-SCNC: 140 MMOL/L (ref 136–145)
WBC # BLD AUTO: 9.31 THOUSAND/UL (ref 4.31–10.16)

## 2017-09-16 PROCEDURE — 80048 BASIC METABOLIC PNL TOTAL CA: CPT | Performed by: NURSE PRACTITIONER

## 2017-09-16 PROCEDURE — 82948 REAGENT STRIP/BLOOD GLUCOSE: CPT

## 2017-09-16 PROCEDURE — 85027 COMPLETE CBC AUTOMATED: CPT | Performed by: NURSE PRACTITIONER

## 2017-09-16 RX ORDER — CEPHALEXIN 500 MG/1
500 CAPSULE ORAL EVERY 8 HOURS SCHEDULED
Qty: 40 CAPSULE | Refills: 0 | Status: SHIPPED | OUTPATIENT
Start: 2017-09-16 | End: 2017-09-16

## 2017-09-16 RX ORDER — CEPHALEXIN 500 MG/1
500 CAPSULE ORAL EVERY 8 HOURS SCHEDULED
Qty: 21 CAPSULE | Refills: 0 | Status: SHIPPED | OUTPATIENT
Start: 2017-09-16 | End: 2017-09-23

## 2017-09-16 RX ADMIN — AMLODIPINE BESYLATE 10 MG: 10 TABLET ORAL at 08:34

## 2017-09-16 RX ADMIN — PANTOPRAZOLE SODIUM 40 MG: 40 TABLET, DELAYED RELEASE ORAL at 05:50

## 2017-09-16 RX ADMIN — METOPROLOL TARTRATE 50 MG: 50 TABLET ORAL at 08:34

## 2017-09-16 RX ADMIN — SODIUM BICARBONATE 650 MG TABLET 650 MG: at 08:27

## 2017-09-16 RX ADMIN — CLONIDINE HYDROCHLORIDE 0.3 MG: 0.3 TABLET ORAL at 08:25

## 2017-09-16 RX ADMIN — LEVOTHYROXINE SODIUM 88 MCG: 88 TABLET ORAL at 05:50

## 2017-09-16 RX ADMIN — HEPARIN SODIUM 5000 UNITS: 5000 INJECTION, SOLUTION INTRAVENOUS; SUBCUTANEOUS at 13:04

## 2017-09-16 RX ADMIN — TACROLIMUS 4 MG: 1 CAPSULE ORAL at 08:27

## 2017-09-16 RX ADMIN — ROPINIROLE 0.25 MG: 0.25 TABLET, FILM COATED ORAL at 08:26

## 2017-09-16 RX ADMIN — HYDRALAZINE HYDROCHLORIDE 50 MG: 50 TABLET, FILM COATED ORAL at 05:50

## 2017-09-16 RX ADMIN — LACTOBACILLUS ACIDOPHILUS / LACTOBACILLUS BULGARICUS 1 PACKET: 100 MILLION CFU STRENGTH GRANULES at 08:25

## 2017-09-16 RX ADMIN — PREDNISONE 7.5 MG: 2.5 TABLET ORAL at 08:26

## 2017-09-16 RX ADMIN — FOLIC ACID 1 MG: 1 TABLET ORAL at 08:21

## 2017-09-16 RX ADMIN — SERTRALINE HYDROCHLORIDE 100 MG: 50 TABLET ORAL at 08:22

## 2017-09-16 RX ADMIN — VITAMIN D, TAB 1000IU (100/BT) 3000 UNITS: 25 TAB at 08:21

## 2017-09-16 RX ADMIN — ARIPIPRAZOLE 20 MG: 10 TABLET ORAL at 08:23

## 2017-09-16 RX ADMIN — DULOXETINE HYDROCHLORIDE 60 MG: 30 CAPSULE, DELAYED RELEASE ORAL at 08:31

## 2017-09-16 RX ADMIN — FUROSEMIDE 20 MG: 20 TABLET ORAL at 13:01

## 2017-09-16 RX ADMIN — LACTOBACILLUS ACIDOPHILUS / LACTOBACILLUS BULGARICUS 1 PACKET: 100 MILLION CFU STRENGTH GRANULES at 13:02

## 2017-09-16 RX ADMIN — INSULIN LISPRO 1 UNITS: 100 INJECTION, SOLUTION INTRAVENOUS; SUBCUTANEOUS at 13:07

## 2017-09-16 RX ADMIN — CEFAZOLIN SODIUM 1000 MG: 1 SOLUTION INTRAVENOUS at 00:15

## 2017-09-16 RX ADMIN — DOCUSATE SODIUM 200 MG: 100 CAPSULE, LIQUID FILLED ORAL at 08:21

## 2017-09-16 RX ADMIN — OXYCODONE HYDROCHLORIDE AND ACETAMINOPHEN 500 MG: 500 TABLET ORAL at 08:30

## 2017-09-16 RX ADMIN — HEPARIN SODIUM 5000 UNITS: 5000 INJECTION, SOLUTION INTRAVENOUS; SUBCUTANEOUS at 08:02

## 2017-09-16 RX ADMIN — PRAVASTATIN SODIUM 80 MG: 80 TABLET ORAL at 08:21

## 2017-09-16 RX ADMIN — ASPIRIN 81 MG 81 MG: 81 TABLET ORAL at 08:21

## 2017-09-18 PROBLEM — I50.32 CHRONIC DIASTOLIC (CONGESTIVE) HEART FAILURE (HCC): Status: ACTIVE | Noted: 2017-09-18

## 2017-09-18 LAB
BACTERIA BLD CULT: NORMAL
BACTERIA BLD CULT: NORMAL

## 2017-09-20 ENCOUNTER — HOSPITAL ENCOUNTER (OUTPATIENT)
Dept: CT IMAGING | Facility: HOSPITAL | Age: 52
Discharge: HOME | End: 2017-09-20
Attending: INTERNAL MEDICINE | Admitting: RADIOLOGY
Payer: MEDICARE

## 2017-09-20 ENCOUNTER — GENERIC CONVERSION - ENCOUNTER (OUTPATIENT)
Dept: OTHER | Facility: OTHER | Age: 52
End: 2017-09-20

## 2017-09-20 VITALS
BODY MASS INDEX: 36.02 KG/M2 | HEART RATE: 73 BPM | OXYGEN SATURATION: 92 % | WEIGHT: 230 LBS | SYSTOLIC BLOOD PRESSURE: 167 MMHG | DIASTOLIC BLOOD PRESSURE: 70 MMHG | TEMPERATURE: 97.5 F | RESPIRATION RATE: 16 BRPM

## 2017-09-20 DIAGNOSIS — D47.2 MGUS (MONOCLONAL GAMMOPATHY OF UNKNOWN SIGNIFICANCE): ICD-10-CM

## 2017-09-20 PROCEDURE — 88262 CHROMOSOME ANALYSIS 15-20: CPT | Performed by: INTERNAL MEDICINE

## 2017-09-20 PROCEDURE — 88237 TISSUE CULTURE BONE MARROW: CPT | Performed by: INTERNAL MEDICINE

## 2017-09-20 PROCEDURE — 88185 FLOWCYTOMETRY/TC ADD-ON: CPT

## 2017-09-20 PROCEDURE — 88374 M/PHMTRC ALYS ISHQUANT/SEMIQ: CPT

## 2017-09-20 PROCEDURE — 88184 FLOWCYTOMETRY/ TC 1 MARKER: CPT | Performed by: INTERNAL MEDICINE

## 2017-09-20 PROCEDURE — 77012 CT SCAN FOR NEEDLE BIOPSY: CPT

## 2017-09-20 PROCEDURE — 88341 IMHCHEM/IMCYTCHM EA ADD ANTB: CPT | Performed by: INTERNAL MEDICINE

## 2017-09-20 PROCEDURE — 88360 TUMOR IMMUNOHISTOCHEM/MANUAL: CPT | Performed by: INTERNAL MEDICINE

## 2017-09-20 PROCEDURE — 85097 BONE MARROW INTERPRETATION: CPT | Performed by: INTERNAL MEDICINE

## 2017-09-20 PROCEDURE — 88342 IMHCHEM/IMCYTCHM 1ST ANTB: CPT | Performed by: INTERNAL MEDICINE

## 2017-09-20 PROCEDURE — 88305 TISSUE EXAM BY PATHOLOGIST: CPT | Performed by: INTERNAL MEDICINE

## 2017-09-20 PROCEDURE — 88311 DECALCIFY TISSUE: CPT | Performed by: INTERNAL MEDICINE

## 2017-09-20 PROCEDURE — 99153 MOD SED SAME PHYS/QHP EA: CPT

## 2017-09-20 PROCEDURE — 88373 M/PHMTRC ALYS ISHQUANT/SEMIQ: CPT

## 2017-09-20 PROCEDURE — 99152 MOD SED SAME PHYS/QHP 5/>YRS: CPT

## 2017-09-20 PROCEDURE — 88367 INSITU HYBRIDIZATION AUTO: CPT

## 2017-09-20 PROCEDURE — 88313 SPECIAL STAINS GROUP 2: CPT | Performed by: INTERNAL MEDICINE

## 2017-09-20 PROCEDURE — 38221 DX BONE MARROW BIOPSIES: CPT

## 2017-09-20 PROCEDURE — 88333 PATH CONSLTJ SURG CYTO XM 1: CPT | Performed by: INTERNAL MEDICINE

## 2017-09-20 PROCEDURE — 88365 INSITU HYBRIDIZATION (FISH): CPT | Performed by: INTERNAL MEDICINE

## 2017-09-20 RX ORDER — FENTANYL CITRATE 50 UG/ML
INJECTION, SOLUTION INTRAMUSCULAR; INTRAVENOUS CODE/TRAUMA/SEDATION MEDICATION
Status: COMPLETED | OUTPATIENT
Start: 2017-09-20 | End: 2017-09-20

## 2017-09-20 RX ORDER — SODIUM CHLORIDE 9 MG/ML
75 INJECTION, SOLUTION INTRAVENOUS CONTINUOUS
Status: DISCONTINUED | OUTPATIENT
Start: 2017-09-20 | End: 2017-09-21 | Stop reason: HOSPADM

## 2017-09-20 RX ORDER — MIDAZOLAM HYDROCHLORIDE 1 MG/ML
INJECTION INTRAMUSCULAR; INTRAVENOUS CODE/TRAUMA/SEDATION MEDICATION
Status: COMPLETED | OUTPATIENT
Start: 2017-09-20 | End: 2017-09-20

## 2017-09-20 RX ADMIN — FENTANYL CITRATE 50 MCG: 50 INJECTION INTRAMUSCULAR; INTRAVENOUS at 09:18

## 2017-09-20 RX ADMIN — FENTANYL CITRATE 25 MCG: 50 INJECTION INTRAMUSCULAR; INTRAVENOUS at 09:30

## 2017-09-20 RX ADMIN — FENTANYL CITRATE 50 MCG: 50 INJECTION INTRAMUSCULAR; INTRAVENOUS at 09:39

## 2017-09-20 RX ADMIN — MIDAZOLAM HYDROCHLORIDE 1 MG: 1 INJECTION, SOLUTION INTRAMUSCULAR; INTRAVENOUS at 09:18

## 2017-09-20 RX ADMIN — FENTANYL CITRATE 25 MCG: 50 INJECTION INTRAMUSCULAR; INTRAVENOUS at 09:35

## 2017-09-20 RX ADMIN — MIDAZOLAM HYDROCHLORIDE 1 MG: 1 INJECTION, SOLUTION INTRAMUSCULAR; INTRAVENOUS at 09:39

## 2017-09-20 RX ADMIN — MIDAZOLAM HYDROCHLORIDE 0.5 MG: 1 INJECTION, SOLUTION INTRAMUSCULAR; INTRAVENOUS at 09:30

## 2017-09-22 LAB — SCAN RESULT: NORMAL

## 2017-09-26 LAB — MISCELLANEOUS LAB TEST RESULT: NORMAL

## 2017-09-28 ENCOUNTER — APPOINTMENT (OUTPATIENT)
Dept: LAB | Age: 52
End: 2017-09-28
Payer: MEDICARE

## 2017-09-28 ENCOUNTER — TRANSCRIBE ORDERS (OUTPATIENT)
Dept: ADMINISTRATIVE | Age: 52
End: 2017-09-28

## 2017-09-28 DIAGNOSIS — Z94.0 HISTORY OF KIDNEY TRANSPLANT: ICD-10-CM

## 2017-09-28 LAB
ANION GAP SERPL CALCULATED.3IONS-SCNC: 8 MMOL/L (ref 4–13)
BACTERIA UR QL AUTO: ABNORMAL /HPF
BILIRUB UR QL STRIP: NEGATIVE
BUN SERPL-MCNC: 45 MG/DL (ref 5–25)
CALCIUM SERPL-MCNC: 9 MG/DL (ref 8.3–10.1)
CHLORIDE SERPL-SCNC: 109 MMOL/L (ref 100–108)
CLARITY UR: ABNORMAL
CO2 SERPL-SCNC: 20 MMOL/L (ref 21–32)
COLOR UR: YELLOW
CREAT SERPL-MCNC: 1.81 MG/DL (ref 0.6–1.3)
CREAT UR-MCNC: 36.6 MG/DL
ERYTHROCYTE [DISTWIDTH] IN BLOOD BY AUTOMATED COUNT: 16.7 % (ref 11.6–15.1)
GFR SERPL CREATININE-BSD FRML MDRD: 31 ML/MIN/1.73SQ M
GLUCOSE P FAST SERPL-MCNC: 100 MG/DL (ref 65–99)
GLUCOSE UR STRIP-MCNC: NEGATIVE MG/DL
HCT VFR BLD AUTO: 34.3 % (ref 34.8–46.1)
HGB BLD-MCNC: 11 G/DL (ref 11.5–15.4)
HGB UR QL STRIP.AUTO: NEGATIVE
KETONES UR STRIP-MCNC: NEGATIVE MG/DL
LEUKOCYTE ESTERASE UR QL STRIP: ABNORMAL
MCH RBC QN AUTO: 30.8 PG (ref 26.8–34.3)
MCHC RBC AUTO-ENTMCNC: 32.1 G/DL (ref 31.4–37.4)
MCV RBC AUTO: 96 FL (ref 82–98)
NITRITE UR QL STRIP: NEGATIVE
NON-SQ EPI CELLS URNS QL MICRO: ABNORMAL /HPF
PH UR STRIP.AUTO: 7 [PH] (ref 4.5–8)
PLATELET # BLD AUTO: 230 THOUSANDS/UL (ref 149–390)
PMV BLD AUTO: 12.5 FL (ref 8.9–12.7)
POTASSIUM SERPL-SCNC: 4.2 MMOL/L (ref 3.5–5.3)
PROT UR STRIP-MCNC: ABNORMAL MG/DL
PROT UR-MCNC: 57 MG/DL
PROT/CREAT UR: 1.56 MG/G{CREAT} (ref 0–0.1)
RBC # BLD AUTO: 3.57 MILLION/UL (ref 3.81–5.12)
RBC #/AREA URNS AUTO: ABNORMAL /HPF
SCAN RESULT: NORMAL
SODIUM SERPL-SCNC: 137 MMOL/L (ref 136–145)
SP GR UR STRIP.AUTO: 1.01 (ref 1–1.03)
UROBILINOGEN UR QL STRIP.AUTO: 0.2 E.U./DL
WBC # BLD AUTO: 9.89 THOUSAND/UL (ref 4.31–10.16)
WBC #/AREA URNS AUTO: ABNORMAL /HPF

## 2017-09-28 PROCEDURE — 85027 COMPLETE CBC AUTOMATED: CPT

## 2017-09-28 PROCEDURE — 80048 BASIC METABOLIC PNL TOTAL CA: CPT

## 2017-09-28 PROCEDURE — 36415 COLL VENOUS BLD VENIPUNCTURE: CPT

## 2017-09-28 PROCEDURE — 81001 URINALYSIS AUTO W/SCOPE: CPT

## 2017-09-28 PROCEDURE — 84156 ASSAY OF PROTEIN URINE: CPT

## 2017-09-28 PROCEDURE — 82570 ASSAY OF URINE CREATININE: CPT

## 2017-09-28 PROCEDURE — 80197 ASSAY OF TACROLIMUS: CPT

## 2017-09-29 ENCOUNTER — GENERIC CONVERSION - ENCOUNTER (OUTPATIENT)
Dept: OTHER | Facility: OTHER | Age: 52
End: 2017-09-29

## 2017-09-29 ENCOUNTER — ALLSCRIPTS OFFICE VISIT (OUTPATIENT)
Dept: OTHER | Facility: OTHER | Age: 52
End: 2017-09-29

## 2017-09-30 LAB — TACROLIMUS BLD LC/MS/MS-MCNC: 6.9 NG/ML (ref 2–20)

## 2017-10-02 ENCOUNTER — GENERIC CONVERSION - ENCOUNTER (OUTPATIENT)
Dept: OTHER | Facility: OTHER | Age: 52
End: 2017-10-02

## 2017-10-02 ENCOUNTER — ALLSCRIPTS OFFICE VISIT (OUTPATIENT)
Dept: OTHER | Facility: OTHER | Age: 52
End: 2017-10-02

## 2017-10-02 DIAGNOSIS — C90.00 MULTIPLE MYELOMA NOT HAVING ACHIEVED REMISSION (HCC): ICD-10-CM

## 2017-10-03 ENCOUNTER — GENERIC CONVERSION - ENCOUNTER (OUTPATIENT)
Dept: OTHER | Facility: OTHER | Age: 52
End: 2017-10-03

## 2017-10-03 NOTE — PROGRESS NOTES
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 urine protein electrophoresis shows a faint possible band  Immunofixation to be performed  - Serum protein immunofixation (8/9/17): Serum immunofixation shows a   biclonal gammopathy identified as IgG kappa ( M-Peak 1 = 0 54 g/dL and   M-Peak 2 =2 27 g/dL)  - Urine protein immunofixation (8/9/17): Urine immunofixation shows a   monoclonal gammopathy identified as IgG kappa (4 31 mg/dL)  - Flow cytometry ([facility]# [id number], evaluated by EDGAR Morrissey )     * Interpretation: Monoclonal IgG kappa plpasma cell population is   detected, consistent with plasma cell dyscrasia/neoplasm  * Immunophenotypic Analysis: Percentage of Abnormal Cells: 2-3% Cell   Size: Medium Viability 7AAD: 96%  A monoclonal IgG kappa plasma cell (CD38 bright) population is present  Polyclonal B cells, 0 2% of events       * The following antigens were evaluated & found to be expressed as   described above or by appropriate cells: CD19, CD20, CD38, CD45, CD56,   ,  Vivian sLambda, sIgM, cKappa, cLambda, cIgM, cIgG, cIgA    - Genetic evaluation by [facility] and cytogenetics is pending and will be   reported as an addendum  Overall, the combination of morphologic and immunophenotypic features is   consistent with IgG kappa restricted plasma cell myeloma  Clinical   correlation is recommended to assess for disease status and severity   (symptomatic myeloma versus smoldering myeloma)  LAB AP SURGICAL ADDITIONAL INFORMATION (Report)     All controls performed with the immunohistochemical stains reported above   reacted appropriately  These tests were developed and their performance   characteristics determined by [facility] or   [facility]  They may not be cleared or approved by the [place]  Food and Drug Administration  The FDA has determined that such clearance   or approval is not necessary  These tests are used for clinical purposes  They should not be regarded as investigational or for research  This   laboratory has been approved by CLIA 88, designated as a high-complexity   laboratory and is qualified to perform these tests  Interpretation performed at [facility]   LAB AP GROSS DESCRIPTION (Report)     A  The specimen is received in formalin, labeled with the patient's name   and hospital number, and is designated Bone marrow right iliac crest   core  The specimen consists of 3 tan osseus tissue fragments measuring   0 3, 0 4 and 0 4 cm  Entirely submitted  One cassette  Following   decalcification in immunocal     B  The specimen is received in formalin, labeled with the patient's name   and hospital number, and is designated Bone marrow right iliac crest   clot  The specimen consists of multiple red brown hemorrhagic fragments   measuring in aggregate 1 6 x 1 x 0 1 cm  Entirely submitted  One   cassette           Note: The estimated total formalin fixation time based upon information   provided by the submitting clinician and the standard processing schedule   is less than 72 hours  C  The specimen is received without fixative, labeled with the patient's   name and hospital number, and is Microscopic slides submitted for   staining  MAC   LAB AP CLINICAL INFORMATION      anemia, hx of osteopenia   LAB AP ADDENDUM 1 (Report)     - Fluorescence in situ hybridization (FISH) ([facility]#[id number], evaluated   by EDGAR Hughes ) for genetic abnormalities which may be   associated with Myeloma is as follows:     Interpretation  1  No evidence of IGH-MAF [translocation t(14;16)] gene rearrangement  2  No evidence of RB1 monosomy (13q14 deletion)  3  No evidence of CCND1-IGH [translocation t(11;14)] gene rearrangement,   and no evidence for trisomy 11 or gain of 11q  4  No evidence of p53 (17p13) deletion or amplification  5  No evidence of FGFR3-IGH [translocation t(4;14)] gene rearrangement  6  Negative for 1q21/CKS1B gain    - Cytogenetic studies as performed on the bone marrow aspirate   Labs (Specimen ID: [id number], evaluated by Scott Phipps, PhD, [facility], [facility] ) as   follows:  Karyotype: 46,XX[20]  Cytogenetics Analysis:  : Metaphases Counted Metaphases Analyzed Metaphases Karyotyped GTG Band   level  Culture Type(s)   20  20  20 350-400    72hrU  Interpretation: A normal female chromosome complement was observed in   twenty metaphases analyzed  Within the limits of the technology utilized in this study, no consistent   numerical or structural abnormality was  identified  Addendum electronically signed by Harman Estrada MD on 9/28/2017 at 7:02 AM     Future Appointments    Date/Time Provider Specialty Site   10/06/2017 09:30 AM EDGAR Rangel  [address] [place] [address] PRACTICE   10/20/2017 09:15 AM Lexis Sheikh RD Diabetes Educator [facility] ENDOCRINOLOGY   10/25/2017 03:00 PM EDGAR Hernandez   Psychiatry [facility]CH[facility]RIC [facility]   11/08/2017 09:00 AM Barb Cervantes EDGAR Bernal  Nephrology [address]   12/08/2017 09:15 AM Ana Matson, [address] [facility] ENDOCRINOLOGY   [facility]23[facility]2017 01:[facility] [address]     Signatures   Electronically signed by :  EDGAR Duckworth ; Oct  2 2017  9:02AM EST                       (Author)

## 2017-10-03 NOTE — PROGRESS NOTES
Assessment  1  Diabetes mellitus with diabetic nephropathy (250 40,583 81) (E11 21)   2  Diabetic polyneuropathy (250 60,357 2) (E11 42)   3  Diabetic retinopathy (250 50,362 01) (E11 319)   4  Status post amputation of right great toe (V49 71) (Z89 411)   5  H/O kidney transplant (V42 0) (Z94 0)   6  Hypothyroidism (244 9) (E03 9)   7  Status post pancreas transplantation (V42 83) (Z94 83)   8  Status post transmetatarsal amputation of left foot (V49 73) (B67 910)    Plan  Diabetes mellitus with diabetic nephropathy    · (1) HEMOGLOBIN A1C; Status:Active; Requested JID:[id number];    Perform:[facility]; SBF:[id number]; Ordered; For:Diabetes mellitus with diabetic nephropathy; Ordered By:Maicol Corey;  Hypothyroidism    · (1) T4, FREE; Status:Active; Requested DSX:84CDM3876;    Perform:[facility]; CFH:54IPU7012; Ordered;For:Hypothyroidism; Ordered By:Maicol Corey;   · (1) TSH; Status:Active; Requested TYS:16ZPZ1069;    Perform:[facility]; TMT:80SUY3777; Ordered;For:Hypothyroidism; Ordered By:Kasey Corey;    Discussion/Summary  Discussion Summary:   1  Type 1 diabetes with decreased pancreatic function: She is fairly well controlled on her current regimen  However, she does experience some mild hypoglycemia after small meals  2 8 her with these bouts of hypoglycemia I have asked her to decrease her dose to 3 or 4 units prior to smaller meals  She may also benefit from flexible insulin therapy  Reviewed recognition and proper treatment of hypoglycemic episodes  Discussed treatment with continuous glucose monitor and insulin pump in the future  Due to the declining function of her pancreatic transplant, she is at increased risk for developing severe hyperglycemia, ketoacidosis volume depletion and falls  She is being treated with intensive insulin therapy which increases her risk for hypoglycemia  Episodes of hypoglycemia can lead to permanent disability and death   Check hemoglobin A1c  Hypothyroidism: She denies any symptoms/complaints of hypo or hyperthyroidism  Continue levothyroxine at current dose  Check TSH and free T4  Diabetic retinopathy: She is continuing to follow up with Ophthalmology for further evaluation and treatment  Counseling Documentation With Imm: The patient was counseled regarding diagnostic results,-instructions for management,-risk factor reductions,-patient and family education,-impressions,-risks and benefits of treatment options,-importance of compliance with treatment  Medication SE Review and Pt Understands Tx: Possible side effects of new medications were reviewed with the patient/guardian today  The treatment plan was reviewed with the patient/guardian  The patient/guardian understands and agrees with the treatment plan      Chief Complaint  Chief Complaint Free Text Note Form: follow up      History of Present Illness  HPI: 67-year-old female presents in the office today for follow-up of type 1 diabetes with multiple complications and 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  She was recently started on Humalog 5 units with meals and toujeo 20 units at bedtime the for increasing hyperglycemia  Her most recent hemoglobin A1c was 6 4 on June 26, 2017  She denies any recent episodes of hypoglycemia, polyuria, polydipsia or polyphagia  She is followed by Ophthalmology for diabetic retinopathy  She also has a history of diabetic peripheral neuropathy with amputation to her right great toe and a left mid tarsal amputation  her hypothyroidism, she is taking levothyroxine 88 mcg daily  She denies any symptoms of hypo or hyperthyroidism  Her most recent TSH from January 13, 2017 was 2 410  Review of Systems  ROS Reviewed:   ROS reviewed     Endo Adult ROS Female Established v2 Update - [facility]:   Constitutional/General: recent weight gain,-no recent weight loss,-poor energy/fatigue,-no increased energy level,-insomnia/sleep problems,-no fever-and-feeling weak  Breasts: no nipple discharge  Heart: high blood pressure-and-palpitations, but-no chest pain/tightness-and-no rapid/racing heart rate  Genitourinary - Urinary: frequent urination,-excess urination-and-urinating during the night  Eyes: blurred vision,-gritty/scratchy eyes-and-excessive tearing, but-no double vision-and-no bulging eyes  Mouth / Throat: hoarseness, but-no difficulty swallowing  Neck: no lumps,-no swollen glands,-no neck pain,-no neck stiffness-and-no enlarged thyroid  Respiratory: no wheezing,-no asthma-and-no persistent cough  Musculoskeletal: muscle aches/pain,-no joint aches/pain-and-muscle weakness  Skin & Hair: dry skin,-no acne,-the hair texture was not oily,-no hair loss-and-excessive hair growth  Gastrointestinal: constipation,-diarrhea,-no waking at night to drink-and-no stomach ache  Neurological: no blackouts,-weakness-and-tremors  Reproductive: mood swings-  frequency of period is not applicable  -duration of period is not applicable  -Date of last menstruation is not applicable -regular periods are not applicable -discomfort with periods is not applicable  -excessive bleeding during period is not applicable  Endocrine: no feeling hot frequently,-no feeling cold frequently,-no shifts between feeling hot and cold,-no cold hands or feet,-no excessive sweating,-thyroid problems,-blood sugar problems,-no excessive thirst,-no excessive hunger,-no change in shoe size,-no nausea or vomiting-and-shaky hands  Active Problems  1  Abnormal EKG (794 31) (R94 31)   2  Acid reflux disease (530 81) (K21 9)   3  Acute kidney injury superimposed on CKD (866 00,585 9) (N17 9,N18 9)   4  Acute UTI (599 0) (N39 0)   5  Anemia (285 9) (D64 9)   6  Antibiotic prophylaxis for dental procedure indicated due to prior joint replacement   (V58 62) (Z79 2)   7  Atrial fibrillation (427 31) (I48 91)   8   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)   9  Benign hypertensive CKD (403 10) (I12 9)   10  Bilateral carotid bruits (785 9) (R09 89)   11  Bilateral leg edema (782 3) (R60 0)   12  Bruit of left carotid artery (785 9) (R09 89)   13  Cataract (366 9) (H26 9)   14  Chronic constipation (564 00) (K59 09)   15  Chronic diastolic congestive heart failure (428 32,428 0) (I50 32)   16  Chronic kidney disease, stage 4 (severe) (585 4) (N18 4)   17  Cocaine abuse in remission (305 63) (F14 10)   18  Cognitive changes (799 59) (R41 89)   19  Colon cancer screening (V76 51) (Z12 11)   20  Constipation (564 00) (K59 00)   21  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, without long-term    current use of insulin (250 61) (E10 49)   22  Diabetes mellitus with diabetic nephropathy (250 40,583 81) (E11 21)   23  Diabetic Gastropathy (537 9)   24  Diabetic polyneuropathy (250 60,357 2) (E11 42)   25  Diabetic retinopathy (250 50,362 01) (E11 319)   26  Diabetic Ulcer Of The Left Foot (250 80)   27  Diastolic dysfunction (847 7) (I51 9)   28  ROD (dyspnea on exertion) (786 09) (R06 09)   29  Drug-induced Essential Tremor (333 1)   30  Encounter for screening mammogram for breast cancer (V76 12) (Z12 31)   31  Esophagitis, reflux (530 11) (K21 0)   32  External Hemorrhoids (455 3)   33  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)   34  Fatigue (780 79) (R53 83)   35  FELIPE (generalized anxiety disorder) (300 02) (F41 1)   36  H/O kidney transplant (V42 0) (Z94 0)   37  Heart palpitations (785 1) (R00 2)   38  Hospital discharge follow-up (V67 59) (Z09)   39  Hyperlipidemia (272 4) (E78 5)   40  Hyperplastic colon polyp (211 3) (K63 5)   41  Hypertension (401 9) (I10)   42  Hyponatremia (276 1) (E87 1)   43  Hypothyroidism (244 9) (E03 9)   44  Increased frequency of urination (788 41) (R35 0)   45  Increased white blood cell count (288 60) (D72 829)   46  Insomnia (780 52) (G47 00)   47  Irritable bowel syndrome (564 1) (K58 9)   48  Major depressive disorder, recurrent episode, in full remission (296 36) (F33 42)   49  Memory loss (780 93) (R41 3)   50  Memory loss or impairment (780 93) (R41 3)   51  Metabolic acidosis (387 0) (E87 2)   52  MGUS (monoclonal gammopathy of unknown significance) (273 1) (D47 2)   53  Need for influenza vaccination (V04 81) (Z23)   54  Nonrheumatic aortic valve insufficiency (424 1) (I35 1)   55  OAB (overactive bladder) (596 51) (N32 81)   56  Osteopenia (733 90) (M85 80)   57  Osteoporosis (733 00) (M81 0)   58  Other calculus in bladder (594 1) (N21 0)   59  Peripheral vascular disease (443 9) (I73 9)   60  Post traumatic stress disorder (309 81) (F43 10)   61  Preop general physical exam (V72 83) (Z01 818)   62  Preoperative cardiovascular examination (V72 81) (Z01 810)   63  Proteinuria (791 0) (R80 9)   64  Pyelonephritis (590 80) (N12)   65  Rectal polyp (569 0) (K62 1)   66  Recurrent UTI (599 0) (N39 0)   67  Restless legs syndrome (333 94) (G25 81)   68  Secondary hyperparathyroidism, renal (588 81) (N25 81)   69  Snoring (786 09) (R06 83)   70  SOB (shortness of breath) (786 05) (R06 02)   71  Status post amputation of right great toe (V49 71) (Z89 411)   72  Status post pancreas transplantation (V42 83) (Z94 83)   73  Status post transmetatarsal amputation of left foot (V49 73) (Z89 432)   74  Tremor (781 0) (R25 1)   75  Unsteady gait (781 2) (R26 81)   76  Weakness (780 79) (R53 1)    Past Medical History  1  History of Abnormal Liver Function Test (790 6)   2  History of Abnormal urine (791 9) (R82 90)   3  History of Abscess of buttock, right (682 5) (L02 31)   4  History of Acute kidney injury (584 9) (N17 9)   5  History of Acute on chronic diastolic congestive heart failure (428 33,428 0) (I50 33)   6  History of Bilateral impacted cerumen (380 4) (H61 23)   7  History of Cervical Dysplasia (V13 22)   8  History of Denial Of Any Significant Medical History   9   History of Diabetes mellitus with foot ulcer (250 80,707 15) (E11 621,L97 509)   10  History of allergic urticaria (V13 3) (Z87 2)   11  History of cholelithiasis (V12 79) (Z87 19)   12  History of dysuria (V13 00) (Z87 898)   13  History of encephalopathy (V12 49) (Z86 69)   14  History of gastritis (V12 79) (Z87 19)   15  Denied: History of head injury   16  History of hematuria (V13 09) (Z87 448)   17  History of hyperkalemia (V12 29) (Z86 39)   18  History of pneumonia (V12 61) (Z87 01)   19  History of seborrhea (V13 3) (Z87 2)   20  History of urinary tract infection (V13 02) (Z87 440)   21  History of urinary tract infection (V13 02) (Z87 440)   22  History of Iliotibial band syndrome (728 89) (M76 30)   23  History of Infected tooth (522 4) (K04 7)   24  History of Lumbar radiculopathy (724 4) (M54 16)   25  History of Neck pain (723 1) (M54 2)   26  History of Nonrheumatic aortic valve insufficiency (424 1) (I35 1)   27  Denied: History of Seizures   28  History of Sinus Tachycardia (427 89)   29  History of Trochanteric bursitis, unspecified laterality (726 5) (M70 60)   30  History of Urinary Tract Infection (V13 02)   31  History of UTI (urinary tract infection), bacterial (599 0,041 9) (N39 0,A49 9)   32  History of Vaginal itching (698 1) (L29 8)  Active Problems And Past Medical History Reviewed: The active problems and past medical history were reviewed and updated today  Surgical History  1  History of Cataract Surgery   2  History of Cholecystectomy   3  History of Complete Colonoscopy   4  History of Complete Colonoscopy   5  History of Diagnostic Esophagogastroduodenoscopy   6  History of Diagnostic Esophagogastroduodenoscopy   7  History of Dilation And Curettage   8  History of Foot Surgery   9  History of Pancreatic Transplantation   10  Renal Transplant   11  History of Surgery Left Foot Amputation  Surgical History Reviewed: The surgical history was reviewed and updated today  Family History  Mother    1   No family history of mental disorder  Father    2  Family history of cancer (V16 9) (Z80 9)   3  No family history of mental disorder  Sister    4  Family history of depression (V17 0) (Z81 8)  Grandparent    5  Family history of cancer (V16 9) (Z80 9)  Maternal Grandmother    6  Family history of Breast Cancer (V16 3)  Family History Reviewed: The family history was reviewed and updated today  Social History   · Denied: History of Alcohol Use (History)   · Former smoker (V15 82) (C86 248)   · History of Uses cocaine  Social History Reviewed: The social history was reviewed and updated today  Current Meds   1  Acetaminophen 325 MG Oral Tablet; TAKE 1 TO 2 TABLETS EVERY 6 HOURS AS   NEEDED Recorded   2  AmLODIPine Besylate 10 MG Oral Tablet; TAKE 1 TABLET BY MOUTH EVERY MORNING   AND 1/2 TABLET BY MOUTH EVERY EVENING; Therapy: 13SAF0651 to (Emmanuelle Ford)  Requested for: 11Aug2017; Last   Rx:11Aug2017 Ordered   3  ARIPiprazole 20 MG Oral Tablet; Therapy: 94MTZ8850 to  Requested for: 58Uwr6301 Recorded   4  Aspirin 81 MG TABS; TAKE 1 TABLET DAILY; Therapy: 56LNY7394 to (Evaluate:19Jun2016) Recorded   5  BD Pen Needle Mary U/F 32G X 4 MM Miscellaneous; Use 4x daily; Therapy: 30VXW7118 to (Evaluate:11Aug2018)  Requested for: 19XID4637; Last   Rx:88Uci9084 Ordered   6  Catapres 0 1 MG Oral Tablet; take 3 tab  in am, 2 tab at noon, 3 tab  at night; Therapy: 85ZLR2383 to (Last Rx:77Hyq7895)  Requested for: 29Spz4085 Ordered   7  Cephalexin 500 MG Oral Capsule; TAKE 1 CAPSULE TWICE DAILY; Therapy: 10Bdf5032 to (Evaluate:32Fsm0760)  Requested for: 27Nzt7055; Last   Rx:84Jsa3113 Ordered   8  Clindamycin HCl - 300 MG Oral Capsule; take 2 caps  1 hr  prior to dental procedure; Therapy: 12QVH8952 to (Last Rx:06Cqq1336)  Requested for: 99Hky7342 Ordered   9  Doxazosin Mesylate 1 MG Oral Tablet; TAKE 1 TABLET Bedtime  Requested for:   12YPP3269; Last Rx:72Frm0068 Ordered   10   DULoxetine HCl - 60 MG Oral Capsule Delayed Release Particles; TAKE 1 CAPSULE BY    MOUTH TWICE DAILY; Therapy: 78RJZ9141 to (Evaluate:33Pzs1572)  Requested for: 06Mpx7204; Last    Rx:51Xsl7489 Ordered   11  Folic Acid 1 MG Oral Tablet; TAKE 1 TABLET DAILY AS DIRECTED; Therapy: 80OTA9593 to (Delon Cedeño)  Requested for: 72BEV5059; Last    Rx:56Lcl2772 Ordered   12  Furosemide 20 MG Oral Tablet; TAKE 1 TABLET DAILY; Therapy: 57Hzo6214 to (Evaluate:54Jke0528) Recorded   13  HumaLOG KwikPen 100 UNIT/ML Subcutaneous Solution Pen-injector; Inject 5 units 3    times daily with meals; Therapy: 54QLC4366 to (Evaluate:14Hje9510)  Requested for: 29Aug2017 Recorded   14  HydrALAZINE HCl - 50 MG Oral Tablet; TAKE 1 TABLET 3 TIMES DAILY; Therapy: 43VET4132 to (Cameron Monotya)  Requested for: 49PNB0948; Last    Rx:13Mar2017 Ordered   15  Lactulose 10 GM/15ML Oral Solution; TAKE 30 ML DAILY; Therapy: 47ZFZ9517 to (Last Rx:02Jun2017)  Requested for: 02Jun2017 Ordered   16  Levothyroxine Sodium 88 MCG Oral Tablet; take 1 tablet by mouth daily; Therapy: 42TAQ8924 to (Evaluate:83Kvf2026)  Requested for: 08Aug2017 Recorded   17  LORazepam 2 MG Oral Tablet; Take 1 tablet 3 times daily as needed; Therapy: 50LPS8853 to (Evaluate:92Hdi6608)  Requested for: 62Fev9750; Last    Rx:08Sep2017 Ordered   18  Metoprolol Tartrate 50 MG Oral Tablet; take 1 tablet by mouth twice daily; Therapy: 31GCX3463 to (Evaluate:51Hsn7754)  Requested for: 27Jun2017; Last    YZ:84CWT1650 Ordered   19  OneTouch Ultra Blue In [employer]; Testing 4 times daily as directed by physician; Therapy: 85KCR2701 to (Evaluate:02Jun2018)  Requested for: 59Xzm5343; Last    Rx:07Spk4468 Ordered   20  OneTouch UltraSoft Lancets Miscellaneous; test twice daily; Therapy: 23XHR0400 to (Evaluate:52Wta6162)  Requested for: 04Icr4811 Recorded   21  Pravastatin Sodium 80 MG Oral Tablet; take 1 tablet by mouth every day;     Therapy: 76SUA8467 to (Evaluate:94Sgt8993)  Requested for: 44HWG5787; Last    Rx:22Jan2017 Ordered   22  PredniSONE 5 MG Oral Tablet; TAKE 1 1/2 TABLETS BY MOUTH EVERY DAY; Therapy: 96VHW3620 to (Evaluate:84Caq1285)  Requested for: 32KVG7441; Last    Rx:06Jun2017 Ordered   23  ROPINIRole HCl - 0 25 MG Oral Tablet; take 1 tablet by mouth twice daily; Therapy: 53XNR9679 to (Priscila Thacker)  Requested for: 29Eeg4115; Last    Rx:27Fzu2531 Ordered   24  Sertraline HCl - 100 MG Oral Tablet; TAKE 2 TABLET BY MOUTH DAILY; Therapy: 15HLG1506 to (03 17 74 30 53)  Requested for: 51CJP9157; Last    Rx:46Agw1362 Ordered   25  Sodium Bicarbonate 650 MG Oral Tablet; Take one tablet daily; Therapy: 76AYY0024 to (Evaluate:12Apr2016)  Requested for: 15NZV5082 Recorded   26  Tacrolimus 1 MG Oral Capsule; Take 4 in the morning and 3 in the evening  Requested    for: 18Vek2410; Last Rx:58Xoj8865 Ordered   27  Toujeo SoloStar 300 UNIT/ML Subcutaneous Solution Pen-injector; INJECT 20 UNIT    Bedtime; Therapy: 48FHA2950 to (Evaluate:38Wgu9076)  Requested for: 79Nul6120 Recorded   28  TraZODone HCl - 150 MG Oral Tablet; TAKE 1 TABLET AT BEDTIME; Therapy: 55DML9042 to (Priscila Thacker)  Requested for: 62Qrr3534; Last    Rx:29Tsd9174 Ordered   29  Vitamin D3 1000 UNIT Oral Capsule; TAKE 3 PILLS AT NOON BY MOUTH; Therapy: (Recorded:17Jun2016) to Recorded  Medication List Reviewed: The medication list was reviewed and updated today  Allergies  1  Penicillins   2  Morphine Derivatives   3  Duricef TABS   4  Myrbetriq TB24    Vitals  Vital Signs    Recorded: 49DPP7468 09:24AM   Heart Rate 72   Systolic 963   Diastolic 58   Height 5 ft 7 5 in   Weight 233 lb 4 96 oz   BMI Calculated 36   BSA Calculated 2 17     Physical Exam    Constitutional   General appearance: No acute distress, well appearing and well nourished  Eyes   Conjunctiva and lids: No swelling, erythema, or discharge      Pupils: Equal, round and reactive to light  The sclera are anicteric  Extraocular movements are intact  Ears, Nose, Mouth, and Throat   External inspection of ears, nose and lips: Normal     Oropharynx: Normal with no erythema, edema, exudate or lesions  Exam of Head: The head is atraumatic and normocephalic  Neck: The neck is supple  The thyroid is normal in size with no palpable nodules  Pulmonary   Respiratory effort: No increased work of breathing or signs of respiratory distress  Auscultation of lungs: Clear to auscultation bilaterally with normal chest expansion  Cardiovascular   Auscultation of heart: Normal rate and rhythm with no murmurs, gallops or rubs  Examination of extremities for edema and/or varicosities: Normal     Examination of pulses: Dorsalis pedal pulses are +2 and equal bilaterally  Examination of carotids: No bruit    Abdomen   Abdomen: Abdomen is soft, non-tender with normal bowel sounds  Liver and spleen: No hepatomegaly or splenomegaly  Lymphatic   Palpation of lymph nodes: No supraclavicular or suboccipital lymphadenopathy  Musculoskeletal   Gait and station: Normal     Digits and nails: Normal without clubbing or cyanosis  Inspection/palpation of joints, bones, and muscles: Abnormal  -Left mid tarsal amputation and amputation of the right great toe  Skin   Skin and subcutaneous tissue: Abnormal  -Dry skin  Neurologic   Cranial nerves: Cranial nerves 2-12 intact  Reflexes: 2+ and symmetric  Sensation: No sensory loss  Motor Strength: Strength is 5/5 bilaterally      Psychiatric   Orientation to person, place and time: Normal     Mood and affect: Affect and attention span are normal        Results/Data  DSMT/MNT Time Record 10Aug2017 12:00AM Russell Tolbert     Test Name Result Flag Reference   Date of Service 8/10/2017     Start - Stop Time 9:45-10:15AM     Total MInutes 30 minutes     Group Or Individual Instruction DSMT-I       (1) C-PEPTIDE 50EKE5566 06:38AM Junie Washburn TW Order Number: [id number]     Test Name Result Flag Reference   C PEPTIDE 4 0 ng/mL  1 1 - 4 4   C-Peptide reference interval is for fasting patients  Performed at:  [address] [place] [address]  [id number]  : Nancy Nielsen MD, Phone:  [phone]     (1) VITAMIN D 25-HYDROXY 37JKU5635 06:40AM Fabian Bautista     Test Name Result Flag Reference   VIT D 25-HYDROX 40 8 ng/mL  30 0-100 0   This assay is a certified procedure of the CDC Vitamin D Standardization Certification Program (VDSCP)     Deficiency <20ng/ml   Insufficiency 20-30ng/ml   Sufficient  ng/ml     *Patients undergoing fluorescein dye angiography may retain small amounts of fluorescein in the body for 48-72 hours post procedure  Samples containing fluorescein can produce falsely elevated Vitamin D values  If the patient had this procedure, a specimen should be resubmitted post fluorescein clearance  (1) LIPID PANEL, FASTING 26Jun2017 06:33AM Yao Cervantes     Test Name Result Flag Reference   CHOLESTEROL 140 mg/dL     HDL,DIRECT 30 mg/dL L 40-60   Specimen collection should occur prior to Metamizole administration due to the potential for falsely depressed results  LDL CHOLESTEROL CALCULATED 63 mg/dL  0-100   Triglyceride:         Normal              <150 mg/dl       Borderline High    150-199 mg/dl       High               200-499 mg/dl       Very High          >499 mg/dl  Cholesterol:         Desirable        <200 mg/dl      Borderline High  200-239 mg/dl      High             >239 mg/dl  HDL Cholesterol:        High    >59 mg/dL      Low     <41 mg/dL  LDL CALCULATED:    This screening LDL is a calculated result  It does not have the accuracy of the Direct Measured LDL in the monitoring of patients with hyperlipidemia and/or statin therapy  Direct Measure LDL (VVM777) must be ordered separately in these patients     TRIGLYCERIDES 234 mg/dL H <=150   Specimen collection should occur prior to N-Acetylcysteine or Metamizole administration due to the potential for falsely depressed results  (1) HEMOGLOBIN A1C 26Jun2017 06:33AM Michelle Herron     Test Name Result Flag Reference   HEMOGLOBIN A1C 6 4 % H 4 2-6 3   EST  AVG  GLUCOSE 137 mg/dl       Future Appointments    Date/Time Provider Specialty Site   10/02/2017 08:15 AM EDGAR Nova  Hematology Oncology CANCER [facility] ONCOLOGY [place]   10/06/2017 09:30 AM EDAGR Rich  [address] [place] [address]   10/20/2017 09:15 AM Fernando Palomares RD Diabetes Educator [facility] ENDOCRINOLOGY   10/23/2017 01:00 PM Debbie Murphy [facility] Nephrology [place] [address]   10/25/2017 03:00 PM EDGAR Joyce  Psychiatry [facility] PSYCHIATRIC [facility]   11/08/2017 09:00 AM EDGAR Gama   Nephrology [place] [address]   12/08/2017 09:15 AM John Corey, [address] Endocrinology [facility] ENDOCRINOLOGY     Signatures   Electronically signed by : Ayana Hester; Sep 30 2017  3:29PM EST                       (Author)    Electronically signed by : EDGAR Can ; Oct  2 2017  7:51AM EST

## 2017-10-05 ENCOUNTER — APPOINTMENT (OUTPATIENT)
Dept: LAB | Age: 52
End: 2017-10-05
Payer: MEDICARE

## 2017-10-05 ENCOUNTER — TRANSCRIBE ORDERS (OUTPATIENT)
Dept: ADMINISTRATIVE | Age: 52
End: 2017-10-05

## 2017-10-05 ENCOUNTER — GENERIC CONVERSION - ENCOUNTER (OUTPATIENT)
Dept: OTHER | Facility: OTHER | Age: 52
End: 2017-10-05

## 2017-10-05 DIAGNOSIS — E85.9 MYELOMA ASSOCIATED AMYLOIDOSIS (HCC): ICD-10-CM

## 2017-10-05 DIAGNOSIS — E85.9 MYELOMA ASSOCIATED AMYLOIDOSIS (HCC): Primary | ICD-10-CM

## 2017-10-05 DIAGNOSIS — C90.00 MULTIPLE MYELOMA NOT HAVING ACHIEVED REMISSION (HCC): ICD-10-CM

## 2017-10-05 DIAGNOSIS — E03.9 HYPOTHYROIDISM: ICD-10-CM

## 2017-10-05 DIAGNOSIS — C90.00 MYELOMA ASSOCIATED AMYLOIDOSIS (HCC): ICD-10-CM

## 2017-10-05 DIAGNOSIS — E11.21 TYPE 2 DIABETES MELLITUS WITH DIABETIC NEPHROPATHY (HCC): ICD-10-CM

## 2017-10-05 DIAGNOSIS — C90.00 MYELOMA ASSOCIATED AMYLOIDOSIS (HCC): Primary | ICD-10-CM

## 2017-10-05 LAB
ALBUMIN SERPL BCP-MCNC: 3 G/DL (ref 3.5–5)
ALP SERPL-CCNC: 128 U/L (ref 46–116)
ALT SERPL W P-5'-P-CCNC: 44 U/L (ref 12–78)
ANION GAP SERPL CALCULATED.3IONS-SCNC: 9 MMOL/L (ref 4–13)
AST SERPL W P-5'-P-CCNC: 25 U/L (ref 5–45)
BASOPHILS # BLD AUTO: 0.05 THOUSANDS/ΜL (ref 0–0.1)
BASOPHILS NFR BLD AUTO: 1 % (ref 0–1)
BILIRUB SERPL-MCNC: 0.34 MG/DL (ref 0.2–1)
BUN SERPL-MCNC: 47 MG/DL (ref 5–25)
CALCIUM SERPL-MCNC: 8.9 MG/DL (ref 8.3–10.1)
CHLORIDE SERPL-SCNC: 107 MMOL/L (ref 100–108)
CO2 SERPL-SCNC: 19 MMOL/L (ref 21–32)
CREAT SERPL-MCNC: 1.99 MG/DL (ref 0.6–1.3)
EOSINOPHIL # BLD AUTO: 0.19 THOUSAND/ΜL (ref 0–0.61)
EOSINOPHIL NFR BLD AUTO: 3 % (ref 0–6)
ERYTHROCYTE [DISTWIDTH] IN BLOOD BY AUTOMATED COUNT: 15.8 % (ref 11.6–15.1)
EST. AVERAGE GLUCOSE BLD GHB EST-MCNC: 105 MG/DL
GFR SERPL CREATININE-BSD FRML MDRD: 28 ML/MIN/1.73SQ M
GLUCOSE P FAST SERPL-MCNC: 89 MG/DL (ref 65–99)
HBA1C MFR BLD: 5.3 % (ref 4.2–6.3)
HCT VFR BLD AUTO: 32.8 % (ref 34.8–46.1)
HGB BLD-MCNC: 10.4 G/DL (ref 11.5–15.4)
IGA SERPL-MCNC: 68 MG/DL (ref 70–400)
IGG SERPL-MCNC: 2450 MG/DL (ref 700–1600)
IGM SERPL-MCNC: 27 MG/DL (ref 40–230)
LYMPHOCYTES # BLD AUTO: 1.06 THOUSANDS/ΜL (ref 0.6–4.47)
LYMPHOCYTES NFR BLD AUTO: 15 % (ref 14–44)
MCH RBC QN AUTO: 29.9 PG (ref 26.8–34.3)
MCHC RBC AUTO-ENTMCNC: 31.7 G/DL (ref 31.4–37.4)
MCV RBC AUTO: 94 FL (ref 82–98)
MONOCYTES # BLD AUTO: 0.4 THOUSAND/ΜL (ref 0.17–1.22)
MONOCYTES NFR BLD AUTO: 6 % (ref 4–12)
NEUTROPHILS # BLD AUTO: 5.3 THOUSANDS/ΜL (ref 1.85–7.62)
NEUTS SEG NFR BLD AUTO: 75 % (ref 43–75)
NRBC BLD AUTO-RTO: 0 /100 WBCS
PLATELET # BLD AUTO: 204 THOUSANDS/UL (ref 149–390)
PMV BLD AUTO: 13 FL (ref 8.9–12.7)
POTASSIUM SERPL-SCNC: 4.3 MMOL/L (ref 3.5–5.3)
PROT SERPL-MCNC: 9.3 G/DL (ref 6.4–8.2)
RBC # BLD AUTO: 3.48 MILLION/UL (ref 3.81–5.12)
SODIUM SERPL-SCNC: 135 MMOL/L (ref 136–145)
T4 FREE SERPL-MCNC: 0.97 NG/DL (ref 0.76–1.46)
TSH SERPL DL<=0.05 MIU/L-ACNC: 3.38 UIU/ML (ref 0.36–3.74)
WBC # BLD AUTO: 7.09 THOUSAND/UL (ref 4.31–10.16)

## 2017-10-05 PROCEDURE — 83036 HEMOGLOBIN GLYCOSYLATED A1C: CPT

## 2017-10-05 PROCEDURE — 83883 ASSAY NEPHELOMETRY NOT SPEC: CPT

## 2017-10-05 PROCEDURE — 84439 ASSAY OF FREE THYROXINE: CPT

## 2017-10-05 PROCEDURE — 84165 PROTEIN E-PHORESIS SERUM: CPT

## 2017-10-05 PROCEDURE — 85025 COMPLETE CBC W/AUTO DIFF WBC: CPT

## 2017-10-05 PROCEDURE — 36415 COLL VENOUS BLD VENIPUNCTURE: CPT

## 2017-10-05 PROCEDURE — 80053 COMPREHEN METABOLIC PANEL: CPT

## 2017-10-05 PROCEDURE — 82784 ASSAY IGA/IGD/IGG/IGM EACH: CPT

## 2017-10-05 PROCEDURE — 84443 ASSAY THYROID STIM HORMONE: CPT

## 2017-10-06 ENCOUNTER — ALLSCRIPTS OFFICE VISIT (OUTPATIENT)
Dept: OTHER | Facility: OTHER | Age: 52
End: 2017-10-06

## 2017-10-06 ENCOUNTER — GENERIC CONVERSION - ENCOUNTER (OUTPATIENT)
Dept: OTHER | Facility: OTHER | Age: 52
End: 2017-10-06

## 2017-10-06 LAB
KAPPA LC FREE SER-MCNC: 75.7 MG/L (ref 3.3–19.4)
KAPPA LC FREE/LAMBDA FREE SER: 3.29 {RATIO} (ref 0.26–1.65)
LAMBDA LC FREE SERPL-MCNC: 23 MG/L (ref 5.7–26.3)

## 2017-10-07 DIAGNOSIS — Z94.0 HISTORY OF KIDNEY TRANSPLANT: ICD-10-CM

## 2017-10-08 NOTE — PROGRESS NOTES
Assessment  1  Hypertension (401 9) (I10)   2  Hypothyroidism (244 9) (E03 9)   3  Diabetes mellitus with diabetic nephropathy (250 40,583 81) (E11 21)   4  Chronic kidney disease, stage 4 (severe) (585 4) (N18 4)   · 2013 US   no cysts   5  H/O kidney transplant (V42 0) (Z94 0)   · Renal/Pancreas transplant -(1998) cr 2 39 (mostly 1-1 6)   6  Diabetic polyneuropathy (250 60,357 2) (E11 42)   7  Hyperlipidemia (272 4) (E78 5)   8  Indolent multiple myeloma (203 00) (C90 00)    Plan  Chronic kidney disease, stage 4 (severe)    · Pneumo (Pneumovax)  Chronic kidney disease, stage 4 (severe), Diabetes mellitus with diabetic nephropathy,  Hyperlipidemia, Hypothyroidism    · Follow-up visit in 5 months Evaluation and Treatment  Follow-up  Status: Hold For -  Scheduling  Requested for: 60Hfm9977   · (1) T4, FREE; Status:Active; Requested for:74Vnu9902;    · (1) TSH; Status:Active; Requested for:75Bgr3779;   Hyperlipidemia    · Pravastatin Sodium 80 MG Oral Tablet; take 1 tablet by mouth every day  Hypothyroidism    · Levothyroxine Sodium 88 MCG Oral Tablet; take 1 tablet by mouth daily  Need for influenza vaccination    · Fluzone Quadrivalent Intramuscular Suspension    Discussion/Summary    Patient presents for 4 month followup of chronic medical conditions  status is stable  continue all current medications  to follow a well low-cholesterol, low-carb diet  with hematology -oncology, nephrology, cardiology, endocrinology, psychiatrist    eye exam by ophthalmologist Dr Te Cuenca  routine foot care by podiatrist Dr Shon Dangelo  regular exercise as tolerated  - Fluzone and Pneumovax administered today  followup office visit in 4 months  The patient was counseled regarding diagnostic results,-instructions for management,-risk factor reductions,-risks and benefits of treatment options,-importance of compliance with treatment  Possible side effects of new medications were reviewed with the patient/guardian today  The treatment plan was reviewed with the patient/guardian  The patient/guardian understands and agrees with the treatment plan      Chief Complaint  Patient here for 4 month follow up for Chronic kidney disease, stage 4 (severe), 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, without long-term current use of insulin, Hyperlipidemia, Hypertension, and Hypothyroidism  Patient is here today for follow up of chronic conditions described in HPI  History of Present Illness  Patient is 47 y o  female with multiple medical problems: HTN, Hyperlipidemia, CHF, AI, CKD stage 4, H/o kidney and pancreatic transplant in 1998, Hypothyroidism, Type 1 DM, diabetic neuropathy, Anemia, Depression, Anxiety  presents for 4 month followup visit  all current medications, specialists notes, blood work results from 10/5/17   10 4, potassium 4 3, creatinine 1 99, GFR 28, Hb A1C 5 3, fasting blood sugar 89, TSH 3 380  was seen by hematology - oncology Dr Kirkland on October 2, 2017, was diagnosed with multiple myeloma  will get a second opinion from [facility]  feeling very tired  Denies bone pains  No fever    - BP remains stable  Denies chest pain  C/o mild stable exertional SOB  was seen by cardiologist Dr Paige Lizama in 4/17 who recommended to continue current medical regimen, repeat ECHO next year  1 DM with diabetic neuropathy- patient was seen by endocrinology and is on September 29, 2017  She is currently on insulin-dependent therapy due to failing pancreatic transplant  The patient denies hypoglycemic episodes  stage 4 patient has been followed by nephrologist Dr James Lo appointment with nephrology Anitha Wilder on 10/23/17   has been followed by ophthalmologist Dr Woods Carlos Alberto  is seeing podiatrist Dr Mari Hilliard for routine foot care monthly  - currently on Pravastatin 80 mg daily  No side effects from statin therapy  - patient takes Levothyroxine 88 mcg daily    / Depression - mood has been stable  Management per psychiatrist Dr Patricia Eaton    scan done in October 2015 showed osteopenia  had colonoscopy in April 2013  done in 4/17  done in 8/12  Review of Systems    Constitutional: feeling tired, but-no fever-and-no chills  Gained 10 lb since 8/17  Eyes: wears glasses, but-no eye pain,-no dryness of the eyes,-eyes not red,-no purulent discharge from the eyes-and-no itching of the eyes  No visual disturbances  ENT: no earache,-no nosebleeds,-no sore throat,-no hearing loss,-no nasal discharge-and-no hoarseness  Cardiovascular: no chest pain,-no intermittent leg claudication,-no palpitations-and-no lower extremity edema  Respiratory: mild stable exertional SOB, but-no wheezing-   The patient presents with complaints of no cough  Gastrointestinal: constipation, but-no abdominal pain,-no nausea,-no vomiting,-no diarrhea-and-no blood in stools  No heartburn  Genitourinary: no dysuria,-no pelvic pain-and-no incontinence  Musculoskeletal: no arthralgias,-no joint swelling-and-no myalgias  Integumentary: no rashes,-no itching-and-no skin wound  Neurological: tingling, numbness in legs, but-no headache-   The patient presents with complaints of no dizziness  Psychiatric: anxiety,-sleep disturbances,-depression-and-symptoms are stable, but-not suicidal    Hematologic/Lymphatic: a tendency for easy bruising, but-no swollen glands,-no tendency for easy bleeding-and-no swollen glands in the neck  Active Problems  1  Abnormal EKG (794 31) (R94 31)   2  Acid reflux disease (530 81) (K21 9)   3  Anemia (285 9) (D64 9)   4  Antibiotic prophylaxis for dental procedure indicated due to prior joint replacement   (V58 62) (Z79 2)   5  Atrial fibrillation (427 31) (I48 91)   6  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)   7  Benign hypertensive CKD (403 10) (I12 9)   8  Bilateral carotid bruits (785 9) (R09 89)   9  Bilateral leg edema (782 3) (R60 0)   10   Bruit of left carotid artery (785 9) (R09 89)   11  Cataract (366 9) (H26 9)   12  Chronic constipation (564 00) (K59 09)   13  Chronic diastolic congestive heart failure (428 32,428 0) (I50 32)   14  Chronic kidney disease, stage 4 (severe) (585 4) (N18 4)   15  Cocaine abuse in remission (305 63) (F14 11)   16  Cognitive changes (799 59) (R41 89)   17  Colon cancer screening (V76 51) (Z12 11)   18  Constipation (564 00) (K59 00)   19  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, without long-term    current use of insulin (250 61) (E10 49)   20  Diabetes mellitus with diabetic nephropathy (250 40,583 81) (E11 21)   21  Diabetic Gastropathy (537 9)   22  Diabetic polyneuropathy (250 60,357 2) (E11 42)   23  Diabetic retinopathy (250 50,362 01) (E11 319)   24  Diabetic Ulcer Of The Left Foot (250 80)   25  Diastolic dysfunction (308 3) (I51 9)   26  ROD (dyspnea on exertion) (786 09) (R06 09)   27  Drug-induced Essential Tremor (333 1)   28  Encounter for screening mammogram for breast cancer (V76 12) (Z12 31)   29  Esophagitis, reflux (530 11) (K21 0)   30  External Hemorrhoids (455 3)   31  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)   32  Fatigue (780 79) (R53 83)   33  FELIPE (generalized anxiety disorder) (300 02) (F41 1)   34  H/O kidney transplant (V42 0) (Z94 0)   35  Heart palpitations (785 1) (R00 2)   36  Hospital discharge follow-up (V67 59) (Z09)   40  Hyperlipidemia (272 4) (E78 5)   38  Hyperplastic colon polyp (211 3) (K63 5)   39  Hypertension (401 9) (I10)   40  Hyponatremia (276 1) (E87 1)   41  Hypothyroidism (244 9) (E03 9)   42  Increased frequency of urination (788 41) (R35 0)   43  Indolent multiple myeloma (203 00) (C90 00)   44  Insomnia (780 52) (G47 00)   45  Irritable bowel syndrome (564 1) (K58 9)   46  Major depressive disorder, recurrent episode, in full remission (296 36) (F33 42)   47  Memory loss (780 93) (R41 3)   48  Memory loss or impairment (780 93) (R41 3)   49   Metabolic acidosis (485 2) (E87 2)   50  MGUS (monoclonal gammopathy of unknown significance) (273 1) (D47 2)   51  Need for influenza vaccination (V04 81) (Z23)   52  Nonrheumatic aortic valve insufficiency (424 1) (I35 1)   53  OAB (overactive bladder) (596 51) (N32 81)   54  Osteopenia (733 90) (M85 80)   55  Osteoporosis (733 00) (M81 0)   56  Other calculus in bladder (594 1) (N21 0)   57  Peripheral vascular disease (443 9) (I73 9)   58  Post traumatic stress disorder (309 81) (F43 10)   59  Preoperative cardiovascular examination (V72 81) (Z01 810)   60  Proteinuria (791 0) (R80 9)   61  Pyelonephritis (590 80) (N12)   62  Rectal polyp (569 0) (K62 1)   63  Recurrent UTI (599 0) (N39 0)   64  Restless legs syndrome (333 94) (G25 81)   65  Secondary hyperparathyroidism, renal (588 81) (N25 81)   66  Snoring (786 09) (R06 83)   67  SOB (shortness of breath) (786 05) (R06 02)   68  Status post amputation of right great toe (V49 71) (Z89 411)   69  Status post pancreas transplantation (V42 83) (Z94 83)   70  Status post transmetatarsal amputation of left foot (V49 73) (Z89 432)   71  Tremor (781 0) (R25 1)   72  Unsteady gait (781 2) (R26 81)   73  Weakness (780 79) (R53 1)    Past Medical History  1  History of Abnormal Liver Function Test (790 6)   2  History of Abnormal urine (791 9) (R82 90)   3  History of Abscess of buttock, right (682 5) (L02 31)   4  History of Acute kidney injury (584 9) (N17 9)   5  History of Acute kidney injury superimposed on CKD (866 00,585 9) (N17 9,N18 9)   6  History of Acute on chronic diastolic congestive heart failure (428 33,428 0) (I50 33)   7  History of Bilateral impacted cerumen (380 4) (H61 23)   8  History of Cervical Dysplasia (V13 22)   9  History of Denial Of Any Significant Medical History   10  History of Diabetes mellitus with foot ulcer (250 80,707 15) (E11 621,L97 509)   11  History of allergic urticaria (V13 3) (Z87 2)   12  History of cholelithiasis (V12 79) (Z87 19)   13   History of dysuria (V13 00) (Z87 898)   14  History of encephalopathy (V12 49) (Z86 69)   15  History of gastritis (V12 79) (Z87 19)   16  Denied: History of head injury   17  History of hematuria (V13 09) (Z87 448)   18  History of hyperkalemia (V12 29) (Z86 39)   19  History of pneumonia (V12 61) (Z87 01)   20  History of seborrhea (V13 3) (Z87 2)   21  History of urinary tract infection (V13 02) (Z87 440)   22  History of urinary tract infection (V13 02) (Z87 440)   23  History of Iliotibial band syndrome (728 89) (M76 30)   24  History of Infected tooth (522 4) (K04 7)   25  History of Lumbar radiculopathy (724 4) (M54 16)   26  History of Neck pain (723 1) (M54 2)   27  History of Nonrheumatic aortic valve insufficiency (424 1) (I35 1)   28  Denied: History of Seizures   29  History of Sinus Tachycardia (427 89)   30  History of Trochanteric bursitis, unspecified laterality (726 5) (M70 60)   31  History of Urinary Tract Infection (V13 02)   32  History of UTI (urinary tract infection), bacterial (599 0,041 9) (N39 0,A49 9)   33  History of Vaginal itching (698 1) (L29 8)    The active problems and past medical history were reviewed and updated today  Surgical History  1  History of Cataract Surgery   2  History of Cholecystectomy   3  History of Complete Colonoscopy   4  History of Complete Colonoscopy   5  History of Diagnostic Esophagogastroduodenoscopy   6  History of Diagnostic Esophagogastroduodenoscopy   7  History of Dilation And Curettage   8  History of Foot Surgery   9  History of Pancreatic Transplantation   10  Renal Transplant   11  History of Surgery Left Foot Amputation    The surgical history was reviewed and updated today  Family History  Mother    1  No family history of mental disorder  Father    2  Family history of cancer (V16 9) (Z80 9)   3  Family history of hypertension (V17 49) (Z82 49)   4  No family history of mental disorder  Sister    5   Family history of depression (V17 0) (Z81 8)  Grandparent    6  Family history of cancer (V16 9) (Z80 9)  Maternal Grandmother    7  Family history of Breast Cancer (V16 3)    The family history was reviewed and updated today  Social History   · Denied: History of Alcohol Use (History)   · Former smoker (V15 82) (I19 400)   · History of Uses cocaine  The social history was reviewed and updated today  Current Meds   1  Acetaminophen 325 MG Oral Tablet; TAKE 1 TO 2 TABLETS EVERY 6 HOURS AS   NEEDED Recorded   2  AmLODIPine Besylate 10 MG Oral Tablet; TAKE 1 TABLET BY MOUTH EVERY MORNING   AND 1/2 TABLET BY MOUTH EVERY EVENING; Therapy: 28HXE2497 to (Florentino Kaplan)  Requested for: 11Aug2017; Last   Rx:11Aug2017 Ordered   3  ARIPiprazole 20 MG Oral Tablet; Therapy: 50BXN9246 to  Requested for: 08Aug2017 Recorded   4  Aspirin 81 MG TABS; TAKE 1 TABLET DAILY; Therapy: 96LCX9035 to (Evaluate:19Jun2016) Recorded   5  BD Pen Needle Mary U/F 32G X 4 MM Miscellaneous; Use 4x daily; Therapy: 81BAP5196 to (Evaluate:11Aug2018)  Requested for: 91SMI1369; Last   Rx:87Nkn9048 Ordered   6  Catapres 0 1 MG Oral Tablet; take 3 tab  in am, 2 tab at noon, 3 tab  at night; Therapy: 97PVK6840 to (Last Rx:25Ahz5605)  Requested for: 17Znl2965 Ordered   7  Cephalexin 500 MG Oral Capsule; TAKE 1 CAPSULE TWICE DAILY; Therapy: 74Rgo8019 to (Evaluate:07Sep2017)  Requested for: 85Nbl3162; Last   Rx:04Aqw9559 Ordered   8  Clindamycin HCl - 300 MG Oral Capsule; take 2 caps  1 hr  prior to dental procedure; Therapy: 01XOA7025 to (Last Rx:79Fex6741)  Requested for: 23Jpr3937 Ordered   9  Doxazosin Mesylate 1 MG Oral Tablet; TAKE 1 TABLET Bedtime  Requested for:   29ZXI4614; Last Rx:64Oii5381 Ordered   10  DULoxetine HCl - 60 MG Oral Capsule Delayed Release Particles; TAKE 1 CAPSULE BY    MOUTH TWICE DAILY; Therapy: 09MXN4145 to (Evaluate:99Iog1737)  Requested for: 70Vob4348; Last    Rx:81Kgm8626 Ordered   11   Folic Acid 1 MG Oral Tablet; TAKE 1 TABLET DAILY AS DIRECTED; Therapy: 56SXI2490 to (77 873 135)  Requested for: 22BDL5268; Last    Rx:09Cmr4243 Ordered   12  Furosemide 20 MG Oral Tablet; TAKE 1 TABLET DAILY; Therapy: 97Ltg9817 to (Evaluate:07Jpg1291) Recorded   13  HumaLOG KwikPen 100 UNIT/ML Subcutaneous Solution Pen-injector; Inject 5 units 3    times daily with meals; Therapy: 72KRP0348 to (Evaluate:14Xbq0448)  Requested for: 23Eyz2650 Recorded   14  HydrALAZINE HCl - 50 MG Oral Tablet; TAKE 1 TABLET 3 TIMES DAILY; Therapy: 53WYJ3848 to (Tom Bhat)  Requested for: 84LDH5290; Last    Rx:13Mar2017 Ordered   15  Lactulose 10 GM/15ML Oral Solution; TAKE 30 ML DAILY; Therapy: 13IXA4457 to (Last Rx:02Jun2017)  Requested for: 70Jsb5109 Ordered   16  Levothyroxine Sodium 88 MCG Oral Tablet; take 1 tablet by mouth daily; Therapy: 18TEL3729 to (Evaluate:20Wko9177)  Requested for: 08Aug2017 Recorded   17  LORazepam 2 MG Oral Tablet; Take 1 tablet 3 times daily as needed; Therapy: 03HBZ3812 to (Evaluate:56Uue9840)  Requested for: 39Bdb9591; Last    Rx:08Sep2017 Ordered   18  Metoprolol Tartrate 50 MG Oral Tablet; take 1 tablet by mouth twice daily; Therapy: 48JBQ4516 to (Evaluate:55Xqj0460)  Requested for: 27Jun2017; Last    HF:02JIL6293 Ordered   19  OneTouch Ultra Blue In [employer]; Testing 4 times daily as directed by physician; Therapy: 80BNJ3045 to (Evaluate:02Jun2018)  Requested for: 42Lww9930; Last    Rx:61Lbe0810 Ordered   20  OneTouch UltraSoft Lancets Miscellaneous; test twice daily; Therapy: 10GYO1766 to (Evaluate:58Qzt2663)  Requested for: 37Snq9024 Recorded   21  Pravastatin Sodium 80 MG Oral Tablet; take 1 tablet by mouth every day; Therapy: 46HCM3943 to (Evaluate:27Yol3811)  Requested for: 43PGV2290; Last    Rx:22Jan2017 Ordered   22  PredniSONE 5 MG Oral Tablet; TAKE 1 tablet alternating with 1 5 tablets on next day;     Therapy: 61NDL9313 to (Evaluate:19Lxl6860)  Requested for: 22KDT7855; Last Rx: 20YUB4219 Ordered   23  ROPINIRole HCl - 0 25 MG Oral Tablet; take 1 tablet by mouth twice daily; Therapy: 50MGE9516 to (Kathlee Fairly)  Requested for: 53Xvh9287; Last    Rx:73Yme7935 Ordered   24  Sertraline HCl - 100 MG Oral Tablet; TAKE 2 TABLET BY MOUTH DAILY; Therapy: 49EFP1638 to [phone])  Requested for: 84OWO1562; Last    Rx:55Sxy1048 Ordered   25  Sodium Bicarbonate 650 MG Oral Tablet; Take one tablet daily; Therapy: 66BDU9743 to (Evaluate:12Apr2016)  Requested for: 25FJO9318 Recorded   26  Tacrolimus 1 MG Oral Capsule; Take 4 in the morning and 3 in the evening  Requested    for: 50Alb3885; Last Rx:28Ujg2629 Ordered   27  Toujeo SoloStar 300 UNIT/ML Subcutaneous Solution Pen-injector; INJECT 20 UNIT    Bedtime; Therapy: 12ZAF2802 to (Evaluate:63Wkt5717)  Requested for: 11Jpi9626 Recorded   28  TraZODone HCl - 150 MG Oral Tablet; TAKE 1 TABLET AT BEDTIME; Therapy: 84DAD9444 to (Kendrahlee Fairly)  Requested for: 28Rgj7598; Last    Rx:54Khi8061 Ordered   29  Vitamin D3 1000 UNIT Oral Capsule; TAKE 3 PILLS AT NOON BY MOUTH; Therapy: (Recorded:17Jun2016) to Recorded    The medication list was reviewed and updated today  Allergies  1  Penicillins   2  Morphine Derivatives   3  Duricef TABS   4  Myrbetriq TB24    Vitals  Vital Signs    Recorded: 32WAY9896 09:34AM Recorded: 24GVD8625 09:17AM   Temperature  97 3 F, Tympanic   Heart Rate  80, L Radial   Respiration  20   Systolic 983 905, LUE   Diastolic 64 66, LUE   Height  5 ft 7 5 in   Weight  229 lb 12 8 oz   BMI Calculated  35 46   BSA Calculated  2 16   Pain Scale  0     Physical Exam    Constitutional   General appearance: No acute distress, well appearing and well nourished  -Obese  Eyes   Conjunctiva and lids: No swelling, erythema or discharge  Pupils and irises: Equal, round and reactive to light      Ears, Nose, Mouth, and Throat   External inspection of ears and nose: Normal     Otoscopic examination: Tympanic membranes translucent with normal light reflex  Canals patent without erythema  Oropharynx: Normal with no erythema, edema, exudate or lesions  Pulmonary   Respiratory effort: No increased work of breathing or signs of respiratory distress  Auscultation of lungs: Clear to auscultation  Cardiovascular   Auscultation of heart: Normal rate and rhythm, normal S1 and S2, without murmurs  Examination of extremities for edema and/or varicosities: Normal     Abdomen   Abdomen: Non-tender, no masses  Liver and spleen: No hepatomegaly or splenomegaly  Musculoskeletal Unsteady gait  Ambulates with a cane  Digits and nails: Normal without clubbing or cyanosis  Inspection/palpation of joints, bones, and muscles: Abnormal   R foot: amputated 1 st toe  L foot: amputated all 5 toes  Skin   Skin and subcutaneous tissue: Normal without rashes or lesions  Neurologic BL hand tremors  Psychiatric   Mood and affect: Normal     Diabetic Foot Screen: Normal        Results/Data  (1) TSH 05Oct2017 07:37AM Olivia Pollock Order Number: [id number]     Test Name Result Flag Reference   TSH 3 380 uIU/mL  0 358-3 740   Patients undergoing fluorescein dye angiography may retain small amounts of fluorescein in the body for 48-72 hours post procedure  Samples containing fluorescein can produce falsely depressed TSH values  If the patient had this procedure,a specimen should be resubmitted post fluorescein clearance            The recommended reference ranges for TSH during pregnancy are as follows:  First trimester 0 1 to 2 5 uIU/mL  Second trimester  0 2 to 3 0 uIU/mL  Third trimester 0 3 to 3 0 uIU/m     (1) BASIC METABOLIC PROFILE 07WYT3663 06:30AM Sage Acosta    Order Number: [id number]     Test Name Result Flag Reference   SODIUM 137 mmol/L  136-145   POTASSIUM 4 2 mmol/L  3 5-5 3   CHLORIDE 109 mmol/L H 100-108   CARBON DIOXIDE 20 mmol/L L 21-32   ANION GAP (CALC) 8 mmol/L  4-13   BLOOD UREA NITROGEN 45 mg/dL H 5-25   CREATININE 1 81 mg/dL H 0 60-1 30   Standardized to IDMS reference method   CALCIUM 9 0 mg/dL  8 3-10 1   eGFR 31 ml/min/1 73sq m     [facility] Disease Education Program recommendations are as follows:  GFR calculation is accurate only with a steady state creatinine  Chronic Kidney disease less than 60 ml/min/1 73 sq  meters  Kidney failure less than 15 ml/min/1 73 sq  meters  GLUCOSE FASTING 100 mg/dL H 65-99   Specimen collection should occur prior to Sulfasalazine administration due to the potential for falsely depressed results  Specimen collection should occur prior to Sulfapyridine administration due to the potential for falsely elevated results  Future Appointments    Date/Time Provider Specialty Site   10/20/2017 09:15 AM Delores Asencio RD Diabetes Educator [address] ENDOCRINOLOGY   2017 08:45 AM Herlinda Apgar), M D  Hematology Oncology CANCER [facility]   10/25/2017 03:00 PM EDGAR Shipman  Psychiatry [facility]   2017 09:00 AM EDGAR Stearns   Nephrology [place] [address]   2017 09:15 AM [facility] [address]  Endocrinology [address] ENDOCRINOLOGY   10/23/2017 01:00 PM Gilda Miguel [facility] Nephrology [facility] 21     Signatures   Electronically signed by : EDGAR Huggins ; Oct  7 2017 11:21AM EST                       (Author)

## 2017-10-09 LAB
ALBUMIN SERPL ELPH-MCNC: 3.48 G/DL (ref 3.5–5)
ALBUMIN SERPL ELPH-MCNC: 40 % (ref 52–65)
ALPHA1 GLOB SERPL ELPH-MCNC: 0.5 G/DL (ref 0.1–0.4)
ALPHA1 GLOB SERPL ELPH-MCNC: 5.7 % (ref 2.5–5)
ALPHA2 GLOB SERPL ELPH-MCNC: 1.02 G/DL (ref 0.4–1.2)
ALPHA2 GLOB SERPL ELPH-MCNC: 11.7 % (ref 7–13)
BETA GLOB ABNORMAL SERPL ELPH-MCNC: 0.42 G/DL (ref 0.4–0.8)
BETA1 GLOB SERPL ELPH-MCNC: 4.8 % (ref 5–13)
BETA2 GLOB SERPL ELPH-MCNC: 9.3 % (ref 2–8)
BETA2+GAMMA GLOB SERPL ELPH-MCNC: 0.81 G/DL (ref 0.2–0.5)
GAMMA GLOB ABNORMAL SERPL ELPH-MCNC: 2.48 G/DL (ref 0.5–1.6)
GAMMA GLOB SERPL ELPH-MCNC: 28.5 % (ref 12–22)
IGG/ALB SER: 0.67 {RATIO} (ref 1.1–1.8)
M PEAK ID 2: 25 %
M PROTEIN 1 MFR SERPL ELPH: 10.1 %
M PROTEIN 1 SERPL ELPH-MCNC: 0.88 G/DL
M PROTEIN 2 SERPL ELPH-MCNC: 2.18 G/DL
PROT SERPL-MCNC: 8.7 G/DL (ref 6.4–8.2)

## 2017-10-13 ENCOUNTER — GENERIC CONVERSION - ENCOUNTER (OUTPATIENT)
Dept: OTHER | Facility: OTHER | Age: 52
End: 2017-10-13

## 2017-10-17 ENCOUNTER — HOSPITAL ENCOUNTER (OUTPATIENT)
Dept: RADIOLOGY | Age: 52
Discharge: HOME | End: 2017-10-17
Payer: MEDICARE

## 2017-10-17 ENCOUNTER — GENERIC CONVERSION - ENCOUNTER (OUTPATIENT)
Dept: OTHER | Facility: OTHER | Age: 52
End: 2017-10-17

## 2017-10-17 DIAGNOSIS — Z94.0 KIDNEY REPLACED BY TRANSPLANT: ICD-10-CM

## 2017-10-17 DIAGNOSIS — R09.89 OTHER SYMPTOMS INVOLVING CARDIOVASCULAR SYSTEM: ICD-10-CM

## 2017-10-17 PROCEDURE — 77080 DXA BONE DENSITY AXIAL: CPT

## 2017-10-20 ENCOUNTER — ALLSCRIPTS OFFICE VISIT (OUTPATIENT)
Dept: OTHER | Facility: OTHER | Age: 52
End: 2017-10-20

## 2017-10-25 ENCOUNTER — ALLSCRIPTS OFFICE VISIT (OUTPATIENT)
Dept: OTHER | Facility: OTHER | Age: 52
End: 2017-10-25

## 2017-10-25 DIAGNOSIS — F41.1 GAD (GENERALIZED ANXIETY DISORDER): ICD-10-CM

## 2017-10-25 DIAGNOSIS — F43.12 POST-TRAUMATIC STRESS DISORDER, CHRONIC: ICD-10-CM

## 2017-10-25 DIAGNOSIS — G47.09 OTHER INSOMNIA: ICD-10-CM

## 2017-10-25 DIAGNOSIS — F33.42 MAJOR DEPRESSIVE DISORDER, RECURRENT EPISODE, IN FULL REMISSION (HCC): Primary | ICD-10-CM

## 2017-10-25 NOTE — CONSULTS
Assessment  1  MGUS (monoclonal gammopathy of unknown significance) (273 1) (D47 2)   2  Diabetic polyneuropathy (250 60,357 2) (E11 42)    Plan  MGUS (monoclonal gammopathy of unknown significance)    · * XR BONE SURVEY COMPLETE; Status:Active; Requested for:53Jau1511;    Perform:[facility]; Due:75Wdb9176; Ordered; For:MGUS (monoclonal gammopathy of unknown significance); Ordered By:Isidra Barragan);   · *1- [facility]IOLOGY Co-Management  Bone marrow biopsy and  aspirate under sedation  Status: Active  Requested for: 53TWQ3003 07:00AM   Ordered; For: MGUS (monoclonal gammopathy of unknown significance); Ordered By: Clay Alba) Performed:  Due: 07NNW1646; Last Updated By: Artem Frias; 8/30/2017 3:03:10 PM  Care Summary provided  : Yes   · Follow-up visit in 1 month Evaluation and Treatment  Follow-up  Status: Complete  Done:  39TTR9393   Ordered; For: MGUS (monoclonal gammopathy of unknown significance); Ordered By: Clay Alba) Performed:  Due: 28XWP0339; Last Updated By: Artem Frias; 8/30/2017 4:53:07 PM    Discussion/Summary    #1  Diabetes mellitus type 1, status post kidney and pancreatic transplant in 1998 at [address] [facility], now with exacerbation of diabetes mellitus type 1 she is insulin-dependent, was noticed to have abnormal monoclonal protein IgG Kappa with first peak of 0 54 g/dL and the second peak of 2 27 g/dL in August 2017, urine protein electrophoresis showed Kappa light chainRule out MGUS versus multiple myeloma versus amyloidosis versus lymphoproliferative disorder secondary to chronic immunosuppression from  and prednisone, patient to have bone marrow biopsy and her general sedation she declined bone marrow biopsy in the officeBony skeleton survey is ordered and follow-up in one month I will keep you updated        Chief Complaint  MGUS      History of Present Illness  67-year-old  female with history of type 1 diabetes mellitus, diabetic neuropathy, status post kidney and pancreatic transplant in 1998 at [address], amputation of the right big toe, cholecystectomy vitamin D deficiency, most recently she has exacerbation of diabetes mellitus was possible pancreatic burn out currently back on insulin, as a workup she was found to have abnormal serum protein electrophoresis on August 2017 With 2 peaks of IgG kappa, the first one 0 54 g/dL and the second one 2 27 g/dLprotein electrophoresis showed a faint possible band of 4 3 mg/dL consistent with kappa light chainand smoked for 15 years quit 4 years ago, she does not drink alcoholhistory significant for skin cancer in father      Review of Systems    Constitutional: feeling tired, but-- no fever,-- no chills-- and-- no recent weight loss  Eyes: eyesight problems  ENT: no complaints of earache, no loss of hearing, no nose bleeds, no nasal discharge, no sore throat, no hoarseness  Cardiovascular: No complaints of slow heart rate, no fast heart rate, no chest pain, no palpitations, no leg claudication, no lower extremity edema  Respiratory: shortness of breath during exertion  Gastrointestinal: No complaints of abdominal pain, no constipation, no nausea or vomiting, no diarrhea, no bloody stools  Genitourinary: No complaints of dysuria, no incontinence, no pelvic pain, no dysmenorrhea, no vaginal discharge or bleeding  Musculoskeletal: no arthralgias  Integumentary: No complaints of skin rash or lesions, no itching, no skin wounds, no breast pain or lump  Neurological: No complaints of headache, no confusion, no convulsions, no numbness, no dizziness or fainting, no tingling, no limb weakness, no difficulty walking  Psychiatric: depression  Endocrine: No complaints of proptosis, no hot flashes, no muscle weakness, no deepening of the voice, no feelings of weakness     Hematologic/Lymphatic: no swollen glands,-- no tendency for easy bleeding-- and-- no swollen glands in the neck  Active Problems  1  Abnormal EKG (794 31) (R94 31)   2  Acid reflux disease (530 81) (K21 9)   3  Acute kidney injury superimposed on CKD (866 00,585 9) (N17 9,N18 9)   4  Acute UTI (599 0) (N39 0)   5  Anemia (285 9) (D64 9)   6  Antibiotic prophylaxis for dental procedure indicated due to prior joint replacement   (V58 62) (Z79 2)   7  Atrial fibrillation (427 31) (I48 91)   8  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)   9  Benign hypertensive CKD (403 10) (I12 9)   10  Bilateral carotid bruits (785 9) (R09 89)   11  Bilateral leg edema (782 3) (R60 0)   12  Bruit of left carotid artery (785 9) (R09 89)   13  Cataract (366 9) (H26 9)   14  Chronic constipation (564 00) (K59 09)   15  Chronic diastolic congestive heart failure (428 32,428 0) (I50 32)   16  Chronic kidney disease, stage 4 (severe) (585 4) (N18 4)   17  Cocaine abuse in remission (305 63) (F14 10)   18  Cognitive changes (799 59) (R41 89)   19  Colon cancer screening (V76 51) (Z12 11)   20  Constipation (564 00) (K59 00)   21  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, without long-term    current use of insulin (250 61) (E10 49)   22  Diabetes mellitus with diabetic nephropathy (250 40,583 81) (E11 21)   23  Diabetic Gastropathy (537 9)   24  Diabetic polyneuropathy (250 60,357 2) (E11 42)   25  Diabetic retinopathy (250 50,362 01) (E11 319)   26  Diabetic Ulcer Of The Left Foot (250 80)   27  Diastolic dysfunction (257 1) (I51 9)   28  ROD (dyspnea on exertion) (786 09) (R06 09)   29  Drug-induced Essential Tremor (333 1)   30  Encounter for screening mammogram for breast cancer (V76 12) (Z12 31)   31  Esophagitis, reflux (530 11) (K21 0)   32  External Hemorrhoids (455 3)   33  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)   34  Fatigue (780 79) (R53 83)   35  FELIPE (generalized anxiety disorder) (300 02) (F41 1)   36  H/O kidney transplant (V42 0) (Z94 0)   37  Heart palpitations (785 1) (R00 2)   38  Hospital discharge follow-up (V67 59) (Z09)   39  Hyperlipidemia (272 4) (E78 5)   40  Hyperplastic colon polyp (211 3) (K63 5)   41  Hypertension (401 9) (I10)   42  Hyponatremia (276 1) (E87 1)   43  Hypothyroidism (244 9) (E03 9)   44  Increased frequency of urination (788 41) (R35 0)   45  Increased white blood cell count (288 60) (D72 829)   46  Insomnia (780 52) (G47 00)   47  Irritable bowel syndrome (564 1) (K58 9)   48  Major depressive disorder, recurrent episode, in full remission (296 36) (F33 42)   49  Memory loss (780 93) (R41 3)   50  Memory loss or impairment (780 93) (R41 3)   51  Metabolic acidosis (576 2) (E87 2)   52  MGUS (monoclonal gammopathy of unknown significance) (273 1) (D47 2)   53  Need for influenza vaccination (V04 81) (Z23)   54  Nonrheumatic aortic valve insufficiency (424 1) (I35 1)   55  OAB (overactive bladder) (596 51) (N32 81)   56  Osteopenia (733 90) (M85 80)   57  Osteoporosis (733 00) (M81 0)   58  Other calculus in bladder (594 1) (N21 0)   59  Peripheral vascular disease (443 9) (I73 9)   60  Post traumatic stress disorder (309 81) (F43 10)   61  Preop general physical exam (V72 83) (Z01 818)   62  Preoperative cardiovascular examination (V72 81) (Z01 810)   63  Proteinuria (791 0) (R80 9)   64  Pyelonephritis (590 80) (N12)   65  Rectal polyp (569 0) (K62 1)   66  Recurrent UTI (599 0) (N39 0)   67  Restless legs syndrome (333 94) (G25 81)   68  Secondary hyperparathyroidism, renal (588 81) (N25 81)   69  Snoring (786 09) (R06 83)   70  SOB (shortness of breath) (786 05) (R06 02)   71  Status post amputation of right great toe (V49 71) (Z89 411)   72  Status post pancreas transplantation (V42 83) (Z94 83)   73  Status post transmetatarsal amputation of left foot (V49 73) (Z89 432)   74  Tremor (781 0) (R25 1)   75  Unsteady gait (781 2) (R26 81)   76  Weakness (780 79) (R53 1)    Past Medical History  1  History of Abnormal Liver Function Test (790 6)   2   History of Abnormal urine (791 9) (R82 90)   3  History of Abscess of buttock, right (682 5) (L02 31)   4  History of Acute kidney injury (584 9) (N17 9)   5  History of Acute on chronic diastolic congestive heart failure (428 33,428 0) (I50 33)   6  History of Bilateral impacted cerumen (380 4) (H61 23)   7  History of Cervical Dysplasia (V13 22)   8  History of Denial Of Any Significant Medical History   9  History of Diabetes mellitus with foot ulcer (250 80,707 15) (E11 621,L97 509)   10  History of allergic urticaria (V13 3) (Z87 2)   11  History of cholelithiasis (V12 79) (Z87 19)   12  History of dysuria (V13 00) (Z87 898)   13  History of encephalopathy (V12 49) (Z86 69)   14  History of gastritis (V12 79) (Z87 19)   15  Denied: History of head injury   16  History of hematuria (V13 09) (Z87 448)   17  History of hyperkalemia (V12 29) (Z86 39)   18  History of pneumonia (V12 61) (Z87 01)   19  History of seborrhea (V13 3) (Z87 2)   20  History of urinary tract infection (V13 02) (Z87 440)   21  History of urinary tract infection (V13 02) (Z87 440)   22  History of Iliotibial band syndrome (728 89) (M76 30)   23  History of Infected tooth (522 4) (K04 7)   24  History of Lumbar radiculopathy (724 4) (M54 16)   25  History of Neck pain (723 1) (M54 2)   26  History of Nonrheumatic aortic valve insufficiency (424 1) (I35 1)   27  Denied: History of Seizures   28  History of Sinus Tachycardia (427 89)   29  History of Trochanteric bursitis, unspecified laterality (726 5) (M70 60)   30  History of Urinary Tract Infection (V13 02)   31  History of UTI (urinary tract infection), bacterial (599 0,041 9) (N39 0,A49 9)   32  History of Vaginal itching (698 1) (L29 8)    The active problems and past medical history were reviewed and updated today  Surgical History  1  History of Cataract Surgery   2  History of Cholecystectomy   3  History of Complete Colonoscopy   4  History of Complete Colonoscopy   5   History of Diagnostic Esophagogastroduodenoscopy   6  History of Diagnostic Esophagogastroduodenoscopy   7  History of Dilation And Curettage   8  History of Foot Surgery   9  History of Pancreatic Transplantation   10  Renal Transplant   11  History of Surgery Left Foot Amputation    The surgical history was reviewed and updated today  Family History  Mother    1  No family history of mental disorder  Father    2  Family history of cancer (V16 9) (Z80 9)   3  No family history of mental disorder  Sister    4  Family history of depression (V17 0) (Z81 8)  Grandparent    5  Family history of cancer (V16 9) (Z80 9)  Maternal Grandmother    6  Family history of Breast Cancer (V16 3)    The family history was reviewed and updated today  Social History   · Denied: History of Alcohol Use (History)   · Former smoker (V15 82) (T88 427)   · History of Uses cocaine  The social history was reviewed and updated today  Current Meds   1  Acetaminophen 325 MG Oral Tablet; TAKE 1 TO 2 TABLETS EVERY 6 HOURS AS   NEEDED Recorded   2  AmLODIPine Besylate 10 MG Oral Tablet; TAKE 1 TABLET BY MOUTH EVERY MORNING   AND 1/2 TABLET BY MOUTH EVERY EVENING; Therapy: 86OST7807 to (Kusum Owens)  Requested for: 11Aug2017; Last   Rx:11Aug2017 Ordered   3  ARIPiprazole 20 MG Oral Tablet; Therapy: 55RZL2670 to  Requested for: 08Aug2017 Recorded   4  Aspirin 81 MG TABS; TAKE 1 TABLET DAILY; Therapy: 42BRO7179 to (Evaluate:19Jun2016) Recorded   5  BD Pen Needle Mary U/F 32G X 4 MM Miscellaneous; Use 4x daily; Therapy: 84YVE3818 to (Evaluate:11Aug2018)  Requested for: 85TQL4508; Last   Rx:54Yla7002 Ordered   6  Catapres 0 1 MG Oral Tablet; take 3 tab  in am, 2 tab at noon, 3 tab  at night; Therapy: 00AIF5061 to (Last Rx:39Wub1556)  Requested for: 06Vuh0736 Ordered   7  Clindamycin HCl - 300 MG Oral Capsule; take 2 caps  1 hr  prior to dental procedure;    Therapy: 75WOM8890 to (Last Rx:32Moa4855)  Requested for: 41GCT7453 Ordered   8  Doxazosin Mesylate 1 MG Oral Tablet; TAKE 1 TABLET Bedtime  Requested for:   40NKY3696; Last Rx:27Ecw8292 Ordered   9  DULoxetine HCl - 60 MG Oral Capsule Delayed Release Particles; TAKE 1 CAPSULE   TWICE DAILY; Therapy: 63LLU3856 to (Evaluate:15Lrm5435)  Requested for: 66RBL5951; Last   Rx:02Mar2017 Ordered   10  Folic Acid 1 MG Oral Tablet; TAKE 1 TABLET DAILY AS DIRECTED; Therapy: 85QLQ3620 to (Evaluate:07Oct2017)  Requested for: 99ATU1273; Last    Rx:12Oct2016 Ordered   11  Furosemide 20 MG Oral Tablet; TAKE 1 TABLET DAILY; Therapy: 08Yyq2835 to (Evaluate:03Zzi3790) Recorded   12  HumaLOG KwikPen 100 UNIT/ML Subcutaneous Solution Pen-injector; Inject 5 units 3    times daily with meals; Therapy: 66WIO6240 to (Evaluate:78Ffu8335)  Requested for: 22Avr8450 Recorded   13  HydrALAZINE HCl - 50 MG Oral Tablet; TAKE 1 TABLET 3 TIMES DAILY; Therapy: 38NEP4096 to (Pita Lentz)  Requested for: 12KZJ1572; Last    Rx:13Mar2017 Ordered   14  Lactulose 10 GM/15ML Oral Solution; TAKE 30 ML DAILY; Therapy: 38CIQ9795 to (Last Rx:02Jun2017)  Requested for: 02Jun2017 Ordered   15  Levothyroxine Sodium 88 MCG Oral Tablet; take 1 tablet by mouth daily; Therapy: 86LFO3790 to (Evaluate:23Lqn0473)  Requested for: 35Mav3933 Recorded   16  LORazepam 2 MG Oral Tablet; Take 1 tablet 2 times daily as needed; Therapy: 02PRT1875 to (Evaluate:22Oup9185)  Requested for: 06VXE5271 Recorded   17  Metoprolol Tartrate 50 MG Oral Tablet; take 1 tablet by mouth twice daily; Therapy: 57ATL5330 to (Evaluate:10Zrr4775)  Requested for: 27Jun2017; Last    GZ:66CFY3058 Ordered   18  OneTouch Ultra Blue In [employer]; test twice daily; Therapy: 29GZS1020 to (Evaluate:43Tjr1780)  Requested for: 12TFP7556; Last    Rx:11Aug2017 Ordered   19  OneTouch UltraSoft Lancets Miscellaneous; test twice daily; Therapy: 49WUH3651 to (Evaluate:58Faf9943)  Requested for: 61Gzm6944 Recorded   20   Pravastatin Sodium 80 MG Oral Tablet; take 1 tablet by mouth every day; Therapy: 09KEG1388 to (Evaluate:70Qvl2371)  Requested for: 98XEE8642; Last    Rx:22Jan2017 Ordered   21  PredniSONE 5 MG Oral Tablet; TAKE 1 1/2 TABLETS BY MOUTH EVERY DAY; Therapy: 78MVX8153 to (Evaluate:50Btu4396)  Requested for: 49BYA6510; Last    Rx:06Jun2017 Ordered   22  ROPINIRole HCl - 0 25 MG Oral Tablet; take 1 tablet by mouth twice daily; Therapy: 19QVQ7388 to (Hallowell Cure)  Requested for: 53Elz6466; Last    Rx:45Zos2178 Ordered   23  Sertraline HCl - 100 MG Oral Tablet; TAKE 2 TABLET BY MOUTH DAILY; Therapy: 84PTE3271 to (797 9386)  Requested for: 65ORH4015; Last    Rx:03Vkc5318 Ordered   24  Sodium Bicarbonate 650 MG Oral Tablet; Take one tablet daily; Therapy: 99UMV5193 to (Evaluate:12Apr2016)  Requested for: 50PYE7578 Recorded   25  Tacrolimus 1 MG Oral Capsule; Take 4 in the morning and 3 in the evening  Requested    for: 14Rbm4667; Last Rx:74Oem8523 Ordered   26  Toujeo SoloStar 300 UNIT/ML Subcutaneous Solution Pen-injector; INJECT 20 UNIT    Bedtime; Therapy: 63QYG1601 to (Evaluate:36Hxj6346)  Requested for: 70Jke9646 Recorded   27  TraZODone HCl - 150 MG Oral Tablet; TAKE 1 TABLET AT BEDTIME; Therapy: 09VKG7257 to (Hallowell Cure)  Requested for: 10Wes3930; Last    Rx:79Unr3815 Ordered   28  Vitamin D3 1000 UNIT Oral Capsule; TAKE 3 PILLS AT NOON BY MOUTH; Therapy: (Recorded:17Jun2016) to Recorded    The medication list was reviewed and updated today  Allergies  1  Penicillins   2  Morphine Derivatives   3  Duricef TABS   4  Myrbetriq TB24    Vitals  Vital Signs    Recorded: 30Aug2017 02:44PM   Temperature 99 1 F   Heart Rate 87   Respiration 16   Systolic 321   Diastolic 62   Height 5 ft 7 5 in   Weight 219 lb 4 0 oz   BMI Calculated 33 83   BSA Calculated 2 11   O2 Saturation 96     Physical Exam    Constitutional   General appearance: No acute distress, well appearing and well nourished      Eyes Conjunctiva and lids: No swelling, erythema or discharge  Pupils and irises: Equal, round and reactive to light  Ears, Nose, Mouth, and Throat   External inspection of ears and nose: Normal     Oropharynx: Normal with no erythema, edema, exudate or lesions  Pulmonary   Auscultation of lungs: Clear to auscultation  Cardiovascular   Auscultation of heart: Normal rate and rhythm, normal S1 and S2, without murmurs  Examination of extremities for edema and/or varicosities: Normal     Carotid pulses: Normal     Abdomen   Abdomen: Non-tender, no masses  Liver and spleen: No hepatomegaly or splenomegaly  Lymphatic   Palpation of lymph nodes in neck: No lymphadenopathy  Musculoskeletal   Gait and station: Normal     Digits and nails: Normal without clubbing or cyanosis  Inspection/palpation of joints, bones, and muscles: Normal     Skin   Skin and subcutaneous tissue: Normal without rashes or lesions  Neurologic   Cranial nerves: Cranial nerves 2-12 intact      Psychiatric   Orientation to person, place, and time: Normal     Mood and affect: Normal         ECOG 1       Results/Data  (1) URINALYSIS (will reflex a microscopy if leukocytes, occult blood, protein or nitrites are not within normal limits) 54Ryj5083 07:26AM Sage Acosta    Order Number: [id number]     Test Name Result Flag Reference   COLOR Yellow     CLARITY Cloudy     SPECIFIC GRAVITY UA 1 011  1 003-1 030   PH UA 7 0  4 5-8 0   LEUKOCYTE ESTERASE UA Large A Negative   NITRITE UA Positive A Negative   PROTEIN UA 30 (1+) mg/dl A Negative   GLUCOSE UA Negative mg/dl  Negative   KETONES UA Negative mg/dl  Negative   UROBILINOGEN UA 0 2 E U /dl  0 2, 1 0 E U /dl   BILIRUBIN UA Negative  Negative   BLOOD UA Negative  Negative   BACTERIA Innumerable /hpf A None Seen, Occasional   EPITHELIAL CELLS None Seen /hpf  None Seen, Occasional   HYALINE CASTS None Seen /lpf  None Seen   RBC UA None Seen /hpf  None Seen   WBC UA 30-50 /hpf A None Seen     (1) CBC/ PLT (NO DIFF) 38Mgq0898 06:31AM Cosme Patricia     Test Name Result Flag Reference   HEMATOCRIT 35 6 %  34 8-46 1   HEMOGLOBIN 11 4 g/dL L 11 5-15 4   MCHC 32 0 g/dL  31 4-37 4   MCH 30 1 pg  26 8-34 3   MCV 94 fL  82-98   PLATELET COUNT 592 Thousands/uL  149-390   RBC COUNT 3 79 Million/uL L 3 81-5 12   RDW 18 1 % H 11 6-15 1   WBC COUNT 8 52 Thousand/uL  4 31-10 16   MPV 13 1 fL H 8 9-12 7     (1)  TACROLIMUS 22Aug2017 06:31AM Cosme Patricia     Test Name Result Flag Reference    5 1 ng/mL  2 0 - 20 0   Trough (immediately following                  transplant)                       15 0          Trough (steady state, 2 weeks or                  more after transplant):      3 0 - 8 0                                   Detection Limit = 1 0          Performed by LC-MS/MS technology  Performed at:  [address]  [id number]  : Tomasa Barron MD, Phone:  [phone]     (1) IMMUNOFIXATION, URINE 95HZY5715 10:34AM Cosme Patricia     Test Name Result Flag Reference   IMMUNOFIXATION INTERPRETATION      Urine immunofixation shows a monoclonal gammopathy identified as IgG kappa (4 31 mg/dL)  Reviewed by: Thania Contreras MD ([id number]) **Electronic Signature**     (1) IMMUNOFIXATION, SERUM 87Sku1171 10:31AM Cosme Patricia     Test Name Result Flag Reference   IMMUNOFIXATION INTERPRETATION      Serum immunofixation shows a biclonal gammopathy identified as IgG kappa ( M-Peak 1 = 0 54 g/dL and M-Peak 2 = 2 27 g/dL)  Reviewed by: Thania Contreras MD ([id number]) **Electronic Signature**     (1) PROTEIN ELECTRO, SERUM 62Ktc5805 06:38AM Cosme Patricia   TW Order Number: [id number]     Test Name Result Flag Reference   A/G RATIO 0 73 L 1 10-1 80   Albumin 42 2 % L 52 0-65 0   Albumin Conc  3 42 g/dl L 3 50-5 00   Alpha 1 Conc  0 33 g/dL  0 10-0 40   ALPHA 1 4 1 %  2 5-5 0   Alpha 2 Conc   0 87 g/dL 0  40-1 20   ALPHA 2 10 7 %  7 0-13 0   Beta 1 Conc  0 38 g/dL L 0 40-0 80   BETA-1 4 7 % L 5 0-13 0   Beta 2 Conc 0 72 g/dL H 0 20-0 50   BETA-2 8 9 % H 2 0-8 0   Gamma Conc 3 10 g/dL H 0 50-1 60   GAMMA GLOBULIN 38 3 % H 12 0-22 0   Interpretation      The serum total protein is within normal limits with hypoalbuminemia  The serum protein electrophoresis shows a decreased beta-1 region  The serum protein electrophoresis shows an increased beta-2 region  The serum protein electrophoresis shows   This is a corrected result  Previous result was The serum total protein is within normal limits with hypoalbuminemia  The serum protein electrophoresis shows a decreased beta-1 region  The serum protein electrophoresis shows hypergammaglobulinemia with a   biclonal gammopathy  Immunofixation to be performed  Reviewed by: Rolly Contreras MD ([id number]) **Electronic Signature** on 8/12/2017 at 0855 EDT   M-Peak 1 Conc  0 54 g/dL     M PEAK ID 1 6 70 %     M-Peak 2 Conc  2 27 g/dL     M PEAK ID 2 28 00 %     TOTAL PROTEIN  8 1 g/dL  6 4-8 2   Hypergammaglobulinemia and a biclonal gammopathy  Immunofixation to be performed  Reviewed by: Rolly Contreras MD     (1) FREE LIGHT CHAINS, SERUM 54Yfh2117 06:38AM Clide Bolder   TW Order Number: [id number]     Test Name Result Flag Reference   FREE KAPPA LIGHT CHAINS, SERUM 49 1 mg/L H 3 3 - 19 4   FREE LAMBDA LIGHT CHAINS, SERUM 18 4 mg/L  5 7 - 26 3   KAPPA/LAMBDA RATIO, SERUM 2 67 H 0 26 - 1 65   Performed at:  [facility] [address]  [id number]  : Shayy Torrez MD, Phone:  [phone]     (1) PROTEIN ELECTRO, URINE 47UNL2813 06:38AM Clide Bolder     Test Name Result Flag Reference   ALPHA 1 URINE 17 8 %     ALPHA 2 URINE 21 4 %     BETA URINE 18 6 %     GAMMA GLOBULIN URINE 9 5 %     M PEAK ID 1 URINE 7 3 %     UPEP INTERPRETATION      The urine total protein is increased   The urine protein electrophoresis shows a faint possible band  Immunofixation to be performed  Reviewed by: Daniel Contreras MD ([id number]) **Electronic Signature**   ALBUMIN URINE ELP 32 7 %     M PEAK 1 CONCENTRATION URINE 4 31 mg/dL     URINE PROTEIN 59 0 mg/dL H 2 0-17 5     Future Appointments    Date/Time Provider Specialty Site   10/06/2017 09:30 AM EDGAR Perez  [address] [place] [address]   09/15/2017 08:45 AM Trina Hernandez RD Diabetes Educator [facility]   10/02/2017 08:15 AM EDGAR Camargo  Hematology [facility] [place]   09/26/2017 02:15 PM EDGAR Thomas  Psychiatry [facility]   11/08/2017 09:00 AM EDGAR Cortes  Nephrology [place] [address]   09/29/2017 09:15 AM Wanda Corey, [address] Endocrinology [facility] END[facility]     Signatures   Electronically signed by :  EDGAR Greenfield ; Aug 30 2017  5:14PM EST                       (Author)

## 2017-10-30 ENCOUNTER — HOSPITAL ENCOUNTER (INPATIENT)
Facility: HOSPITAL | Age: 52
LOS: 2 days | Discharge: HOME | DRG: 690 | End: 2017-11-02
Attending: EMERGENCY MEDICINE | Admitting: INTERNAL MEDICINE
Payer: MEDICARE

## 2017-10-30 ENCOUNTER — APPOINTMENT (EMERGENCY)
Dept: RADIOLOGY | Facility: HOSPITAL | Age: 52
DRG: 690 | End: 2017-10-30
Payer: MEDICARE

## 2017-10-30 DIAGNOSIS — R32 URINARY INCONTINENCE: ICD-10-CM

## 2017-10-30 DIAGNOSIS — N21.0: ICD-10-CM

## 2017-10-30 DIAGNOSIS — N39.0 UTI (URINARY TRACT INFECTION): ICD-10-CM

## 2017-10-30 DIAGNOSIS — Z94.0 RENAL TRANSPLANT RECIPIENT: Primary | ICD-10-CM

## 2017-10-30 DIAGNOSIS — R44.3 HALLUCINATION: ICD-10-CM

## 2017-10-30 DIAGNOSIS — N18.30 CHRONIC KIDNEY DISEASE, STAGE 3 (HCC): ICD-10-CM

## 2017-10-30 DIAGNOSIS — Z94.0 STATUS POST SIMULTANEOUS KIDNEY AND PANCREAS TRANSPLANT (HCC): ICD-10-CM

## 2017-10-30 DIAGNOSIS — Z94.83 STATUS POST SIMULTANEOUS KIDNEY AND PANCREAS TRANSPLANT (HCC): ICD-10-CM

## 2017-10-30 DIAGNOSIS — D84.9 IMMUNOSUPPRESSION (HCC): ICD-10-CM

## 2017-10-30 PROBLEM — R10.9 ABDOMINAL PAIN: Status: RESOLVED | Noted: 2017-06-14 | Resolved: 2017-10-30

## 2017-10-30 PROBLEM — N12 PYELONEPHRITIS: Status: RESOLVED | Noted: 2017-06-14 | Resolved: 2017-10-30

## 2017-10-30 LAB
ALBUMIN SERPL BCP-MCNC: 3.1 G/DL (ref 3.5–5)
ALP SERPL-CCNC: 117 U/L (ref 46–116)
ALT SERPL W P-5'-P-CCNC: 25 U/L (ref 12–78)
AMYLASE UR-CCNC: >6500 U/L
ANION GAP SERPL CALCULATED.3IONS-SCNC: 8 MMOL/L (ref 4–13)
AST SERPL W P-5'-P-CCNC: 15 U/L (ref 5–45)
BACTERIA UR QL AUTO: ABNORMAL /HPF
BASOPHILS # BLD AUTO: 0.06 THOUSANDS/ΜL (ref 0–0.1)
BASOPHILS NFR BLD AUTO: 1 % (ref 0–1)
BILIRUB SERPL-MCNC: 0.34 MG/DL (ref 0.2–1)
BILIRUB UR QL STRIP: NEGATIVE
BUN SERPL-MCNC: 36 MG/DL (ref 5–25)
CALCIUM SERPL-MCNC: 9.3 MG/DL (ref 8.3–10.1)
CHLORIDE SERPL-SCNC: 108 MMOL/L (ref 100–108)
CLARITY UR: ABNORMAL
CO2 SERPL-SCNC: 20 MMOL/L (ref 21–32)
COLOR UR: YELLOW
CREAT SERPL-MCNC: 1.79 MG/DL (ref 0.6–1.3)
EOSINOPHIL # BLD AUTO: 0.12 THOUSAND/ΜL (ref 0–0.61)
EOSINOPHIL NFR BLD AUTO: 1 % (ref 0–6)
ERYTHROCYTE [DISTWIDTH] IN BLOOD BY AUTOMATED COUNT: 16.5 % (ref 11.6–15.1)
GFR SERPL CREATININE-BSD FRML MDRD: 32 ML/MIN/1.73SQ M
GLUCOSE SERPL-MCNC: 128 MG/DL (ref 65–140)
GLUCOSE SERPL-MCNC: 147 MG/DL (ref 65–140)
GLUCOSE SERPL-MCNC: 90 MG/DL (ref 65–140)
GLUCOSE SERPL-MCNC: 95 MG/DL (ref 65–140)
GLUCOSE UR STRIP-MCNC: NEGATIVE MG/DL
HCT VFR BLD AUTO: 34.4 % (ref 34.8–46.1)
HGB BLD-MCNC: 11.3 G/DL (ref 11.5–15.4)
HGB UR QL STRIP.AUTO: NEGATIVE
HYALINE CASTS #/AREA URNS LPF: ABNORMAL /LPF
KETONES UR STRIP-MCNC: NEGATIVE MG/DL
LEUKOCYTE ESTERASE UR QL STRIP: ABNORMAL
LIPASE SERPL-CCNC: 120 U/L (ref 73–393)
LYMPHOCYTES # BLD AUTO: 1.54 THOUSANDS/ΜL (ref 0.6–4.47)
LYMPHOCYTES NFR BLD AUTO: 16 % (ref 14–44)
MCH RBC QN AUTO: 30 PG (ref 26.8–34.3)
MCHC RBC AUTO-ENTMCNC: 32.8 G/DL (ref 31.4–37.4)
MCV RBC AUTO: 91 FL (ref 82–98)
MONOCYTES # BLD AUTO: 0.58 THOUSAND/ΜL (ref 0.17–1.22)
MONOCYTES NFR BLD AUTO: 6 % (ref 4–12)
NEUTROPHILS # BLD AUTO: 7.35 THOUSANDS/ΜL (ref 1.85–7.62)
NEUTS SEG NFR BLD AUTO: 76 % (ref 43–75)
NITRITE UR QL STRIP: NEGATIVE
NON-SQ EPI CELLS URNS QL MICRO: ABNORMAL /HPF
NRBC BLD AUTO-RTO: 0 /100 WBCS
PH UR STRIP.AUTO: 7 [PH] (ref 4.5–8)
PLATELET # BLD AUTO: 196 THOUSANDS/UL (ref 149–390)
PMV BLD AUTO: 12.1 FL (ref 8.9–12.7)
POTASSIUM SERPL-SCNC: 3.6 MMOL/L (ref 3.5–5.3)
PROT SERPL-MCNC: 9.7 G/DL (ref 6.4–8.2)
PROT UR STRIP-MCNC: ABNORMAL MG/DL
RBC # BLD AUTO: 3.77 MILLION/UL (ref 3.81–5.12)
RBC #/AREA URNS AUTO: ABNORMAL /HPF
SODIUM SERPL-SCNC: 136 MMOL/L (ref 136–145)
SP GR UR STRIP.AUTO: 1.02 (ref 1–1.03)
UROBILINOGEN UR QL STRIP.AUTO: 0.2 E.U./DL
WBC # BLD AUTO: 9.74 THOUSAND/UL (ref 4.31–10.16)
WBC #/AREA URNS AUTO: ABNORMAL /HPF

## 2017-10-30 PROCEDURE — 36415 COLL VENOUS BLD VENIPUNCTURE: CPT | Performed by: EMERGENCY MEDICINE

## 2017-10-30 PROCEDURE — 83690 ASSAY OF LIPASE: CPT | Performed by: EMERGENCY MEDICINE

## 2017-10-30 PROCEDURE — 80053 COMPREHEN METABOLIC PANEL: CPT | Performed by: EMERGENCY MEDICINE

## 2017-10-30 PROCEDURE — 85025 COMPLETE CBC W/AUTO DIFF WBC: CPT | Performed by: EMERGENCY MEDICINE

## 2017-10-30 PROCEDURE — 87086 URINE CULTURE/COLONY COUNT: CPT

## 2017-10-30 PROCEDURE — 82948 REAGENT STRIP/BLOOD GLUCOSE: CPT

## 2017-10-30 PROCEDURE — 99285 EMERGENCY DEPT VISIT HI MDM: CPT

## 2017-10-30 PROCEDURE — 82150 ASSAY OF AMYLASE: CPT | Performed by: EMERGENCY MEDICINE

## 2017-10-30 PROCEDURE — 51798 US URINE CAPACITY MEASURE: CPT

## 2017-10-30 PROCEDURE — 81001 URINALYSIS AUTO W/SCOPE: CPT

## 2017-10-30 RX ORDER — HEPARIN SODIUM 5000 [USP'U]/ML
5000 INJECTION, SOLUTION INTRAVENOUS; SUBCUTANEOUS EVERY 8 HOURS SCHEDULED
Status: DISCONTINUED | OUTPATIENT
Start: 2017-10-30 | End: 2017-11-02 | Stop reason: HOSPADM

## 2017-10-30 RX ORDER — SERTRALINE HYDROCHLORIDE 100 MG/1
200 TABLET, FILM COATED ORAL DAILY
Status: DISCONTINUED | OUTPATIENT
Start: 2017-10-31 | End: 2017-11-02 | Stop reason: HOSPADM

## 2017-10-30 RX ORDER — AMLODIPINE BESYLATE 10 MG/1
10 TABLET ORAL DAILY
Status: DISCONTINUED | OUTPATIENT
Start: 2017-10-31 | End: 2017-11-02 | Stop reason: HOSPADM

## 2017-10-30 RX ORDER — MELATONIN
1000 DAILY
Status: DISCONTINUED | OUTPATIENT
Start: 2017-10-31 | End: 2017-11-02 | Stop reason: HOSPADM

## 2017-10-30 RX ORDER — DULOXETIN HYDROCHLORIDE 60 MG/1
60 CAPSULE, DELAYED RELEASE ORAL 2 TIMES DAILY
Status: DISCONTINUED | OUTPATIENT
Start: 2017-10-30 | End: 2017-11-02 | Stop reason: HOSPADM

## 2017-10-30 RX ORDER — TACROLIMUS 1 MG/1
4 CAPSULE ORAL DAILY
Status: DISCONTINUED | OUTPATIENT
Start: 2017-10-31 | End: 2017-11-02 | Stop reason: HOSPADM

## 2017-10-30 RX ORDER — DOXAZOSIN MESYLATE 1 MG/1
1 TABLET ORAL
Status: DISCONTINUED | OUTPATIENT
Start: 2017-10-30 | End: 2017-11-02

## 2017-10-30 RX ORDER — LEVOTHYROXINE SODIUM 88 UG/1
88 TABLET ORAL
Status: DISCONTINUED | OUTPATIENT
Start: 2017-10-31 | End: 2017-11-02 | Stop reason: HOSPADM

## 2017-10-30 RX ORDER — HYDRALAZINE HYDROCHLORIDE 50 MG/1
50 TABLET, FILM COATED ORAL 3 TIMES DAILY
Status: DISCONTINUED | OUTPATIENT
Start: 2017-10-30 | End: 2017-11-02 | Stop reason: HOSPADM

## 2017-10-30 RX ORDER — LABETALOL HYDROCHLORIDE 5 MG/ML
10 INJECTION, SOLUTION INTRAVENOUS EVERY 4 HOURS PRN
Status: DISCONTINUED | OUTPATIENT
Start: 2017-10-30 | End: 2017-11-02 | Stop reason: HOSPADM

## 2017-10-30 RX ORDER — TRAMADOL HYDROCHLORIDE 50 MG/1
50 TABLET ORAL EVERY 6 HOURS PRN
Status: DISCONTINUED | OUTPATIENT
Start: 2017-10-30 | End: 2017-11-02 | Stop reason: HOSPADM

## 2017-10-30 RX ORDER — ASPIRIN 81 MG/1
81 TABLET, CHEWABLE ORAL DAILY
Status: DISCONTINUED | OUTPATIENT
Start: 2017-10-31 | End: 2017-11-02 | Stop reason: HOSPADM

## 2017-10-30 RX ORDER — CLONIDINE HYDROCHLORIDE 0.1 MG/1
0.2 TABLET ORAL DAILY
Status: DISCONTINUED | OUTPATIENT
Start: 2017-10-31 | End: 2017-10-31

## 2017-10-30 RX ORDER — ARIPIPRAZOLE 10 MG/1
20 TABLET ORAL DAILY
Status: DISCONTINUED | OUTPATIENT
Start: 2017-10-31 | End: 2017-11-02 | Stop reason: HOSPADM

## 2017-10-30 RX ORDER — SODIUM BICARBONATE 650 MG/1
650 TABLET ORAL 3 TIMES DAILY
Status: DISCONTINUED | OUTPATIENT
Start: 2017-10-30 | End: 2017-11-02 | Stop reason: HOSPADM

## 2017-10-30 RX ORDER — CLONIDINE HYDROCHLORIDE 0.3 MG/1
0.3 TABLET ORAL 2 TIMES DAILY
Status: DISCONTINUED | OUTPATIENT
Start: 2017-10-30 | End: 2017-10-31

## 2017-10-30 RX ORDER — AMLODIPINE BESYLATE 5 MG/1
5 TABLET ORAL EVERY EVENING
Status: DISCONTINUED | OUTPATIENT
Start: 2017-10-30 | End: 2017-11-02 | Stop reason: HOSPADM

## 2017-10-30 RX ORDER — FUROSEMIDE 20 MG/1
20 TABLET ORAL DAILY
Status: DISCONTINUED | OUTPATIENT
Start: 2017-10-31 | End: 2017-11-02 | Stop reason: HOSPADM

## 2017-10-30 RX ORDER — ROPINIROLE 0.25 MG/1
0.25 TABLET, FILM COATED ORAL 2 TIMES DAILY
Status: DISCONTINUED | OUTPATIENT
Start: 2017-10-30 | End: 2017-11-02 | Stop reason: HOSPADM

## 2017-10-30 RX ORDER — TACROLIMUS 1 MG/1
3 CAPSULE ORAL DAILY
Status: DISCONTINUED | OUTPATIENT
Start: 2017-10-31 | End: 2017-11-01

## 2017-10-30 RX ORDER — FOLIC ACID 1 MG/1
1 TABLET ORAL 2 TIMES DAILY
Status: DISCONTINUED | OUTPATIENT
Start: 2017-10-30 | End: 2017-11-02 | Stop reason: HOSPADM

## 2017-10-30 RX ORDER — AMLODIPINE BESYLATE 5 MG/1
5 TABLET ORAL DAILY
Status: DISCONTINUED | OUTPATIENT
Start: 2017-10-31 | End: 2017-10-30

## 2017-10-30 RX ORDER — ACETAMINOPHEN 325 MG/1
975 TABLET ORAL ONCE
Status: COMPLETED | OUTPATIENT
Start: 2017-10-30 | End: 2017-10-30

## 2017-10-30 RX ORDER — PREDNISONE 2.5 MG
7.5 TABLET ORAL DAILY
Status: DISCONTINUED | OUTPATIENT
Start: 2017-10-31 | End: 2017-11-01

## 2017-10-30 RX ADMIN — AMLODIPINE BESYLATE 5 MG: 5 TABLET ORAL at 19:28

## 2017-10-30 RX ADMIN — LABETALOL 20 MG/4 ML (5 MG/ML) INTRAVENOUS SYRINGE 10 MG: at 19:29

## 2017-10-30 RX ADMIN — CLONIDINE HYDROCHLORIDE 0.3 MG: 0.3 TABLET ORAL at 19:02

## 2017-10-30 RX ADMIN — CEFEPIME 2000 MG: 2 INJECTION, POWDER, FOR SOLUTION INTRAMUSCULAR; INTRAVENOUS at 14:13

## 2017-10-30 RX ADMIN — DULOXETINE HYDROCHLORIDE 60 MG: 60 CAPSULE, DELAYED RELEASE ORAL at 19:01

## 2017-10-30 RX ADMIN — SODIUM BICARBONATE 650 MG: 650 TABLET ORAL at 22:00

## 2017-10-30 RX ADMIN — TRAMADOL HYDROCHLORIDE 50 MG: 50 TABLET, FILM COATED ORAL at 19:28

## 2017-10-30 RX ADMIN — HYDRALAZINE HYDROCHLORIDE 50 MG: 50 TABLET, FILM COATED ORAL at 18:51

## 2017-10-30 RX ADMIN — ROPINIROLE 0.25 MG: 0.25 TABLET, FILM COATED ORAL at 19:01

## 2017-10-30 RX ADMIN — METOPROLOL TARTRATE 50 MG: 25 TABLET ORAL at 18:51

## 2017-10-30 RX ADMIN — ACETAMINOPHEN 975 MG: 325 TABLET, FILM COATED ORAL at 10:47

## 2017-10-30 RX ADMIN — HEPARIN SODIUM 5000 UNITS: 5000 INJECTION, SOLUTION INTRAVENOUS; SUBCUTANEOUS at 22:32

## 2017-10-30 RX ADMIN — FOLIC ACID 1 MG: 1 TABLET ORAL at 18:52

## 2017-10-30 RX ADMIN — DOXAZOSIN 1 MG: 1 TABLET ORAL at 22:34

## 2017-10-30 NOTE — ED ATTENDING ATTESTATION
Mary Romero MD, saw and evaluated the patient  I have discussed the patient with the resident/non-physician practitioner and agree with the resident's/non-physician practitioner's findings, Plan of Care, and MDM as documented in the resident's/non-physician practitioner's note, except where noted  All available labs and Radiology studies were reviewed  At this point I agree with the current assessment done in the Emergency Department  I have conducted an independent evaluation of this patient a history and physical is as follows:  Pt sp transplant surgery in 1998 of pancreas and kidneys  Pt recently hospitalized due to uti and was discharged in September on Keflex After completing antibiotics symptoms recurred  Pt has been on 2 additional courses of Keflex for urinary incontinence  Pt also started with some back pain  Pt immunocompromised Pt not improved despite 2 outpt courses of antibiotics PE: alert some tenderness in llq area over pt transplanted kidney   MDM: will check labs treat symptoms us kidney and admit     Critical Care Time  CritCare Time

## 2017-10-30 NOTE — ED PROVIDER NOTES
History  Chief Complaint   Patient presents with    Urinary Incontinence     Pt has recent UTI, treated with Keflex, total of 3 rounds, UTI symptoms continue  76-year-old female with past medical history of pancreas and kidney transplant in 1998 currently receiving Prograf and prednisone, CKD,  diabetes mellitus which has recurred after pancreas transplant, diastolic CHF, and hypertension who is presenting with urinary incontinence  Patient was admitted to this facility with UTI in mid September and was discharged on September 16th  Patient was treated with Ancef and transitioned to oral Keflex as the pathogen was susceptible to these antibiotics  Patient states that when she finished her course of Keflex, her symptoms recurred  Since that time, she has received 2 additional courses of Keflex  When patient is taking Keflex, she states her symptoms are controlled  Patient finished most recent course of Keflex on Saturday, October 28th  Patient states that she has frequent episodes of incontinence  This incontinence is not urge-type, as patient does not feel that she needs to urinate  Rather, patient states that she leaks urine and is not aware of it until she wet herself  Patient denies dysuria and hematuria  Patient also complains of back pain which began on Saturday October 28th  Patient states that it is located to the right and left of midline on her lower back  Patient denies associated radicular pain, saddle anesthesia  She is not aware of any disc herniations or back problems in the past   In addition, patient reports chronic constipation with diarrhea when she takes laxatives  Patient denies fever, chills, diaphoresis, chest pain or pressure, shortness of breath, nausea, vomiting, abdominal pain, melena, hematochezia  Plan:  Urinalysis of obtained in triage    We will obtain CBC to evaluate for leukocytosis and anemia; CMP to evaluate for renal function, electrolyte abnormalities, and hepatobiliary pathology; lipase to evaluate for pancreatitis; urinary amylase to evaluate for pancreatic transplant rejection; ultrasound of the bladder and kidneys with to evaluate for structural abnormalities as well as blood flow to the donor kidney and urinary retention  History provided by:  Patient   used: No        Prior to Admission Medications   Prescriptions Last Dose Informant Patient Reported? Taking? ARIPiprazole (ABILIFY) 20 MG tablet   Yes Yes   Sig: Take 20 mg by mouth daily  Cholecalciferol (VITAMIN D3) 3000 UNITS TABS   Yes Yes   Sig: Take 1 tablet by mouth daily  DULoxetine (CYMBALTA) 60 mg delayed release capsule   Yes Yes   Sig: Take 60 mg by mouth 2 (two) times a day  Insulin Glargine (TOUJEO SOLOSTAR) 300 UNIT/ML SOPN   No Yes   Sig: Inject 10 Units under the skin daily   Patient taking differently: Inject 20 Units under the skin daily     LORazepam (ATIVAN) 2 mg tablet   Yes Yes   Sig: Take 2 mg by mouth 2 (two) times a day as needed for anxiety     Lactulose Encephalopathy (GENERLAC PO)   Yes Yes   Sig: Take 30 mL by mouth daily  Levothyroxine Sodium 88 MCG CAPS   Yes Yes   Sig: Take 88 mcg by mouth daily     Tacrolimus (PROGRAF PO)   Yes Yes   Sig: Take 3 mg by mouth daily PM   acetaminophen (TYLENOL) 325 mg tablet   No Yes   Sig: Take 2 tablets (650 mg total) by mouth every 6 (six) hours as needed for mild pain or fever  amLODIPine (NORVASC) 5 mg tablet   Yes Yes   Sig: Take 10 mg by mouth daily AM    amLODIPine (NORVASC) 5 mg tablet   Yes Yes   Sig: Take 5 mg by mouth daily PM   aspirin 81 MG tablet   Yes Yes   Sig: Take 81 mg by mouth daily     cloNIDine (CATAPRES) 0 2 mg tablet   Yes Yes   Sig: Take 0 2 mg by mouth daily noon   cloNIDine (CATAPRES) 0 3 mg tablet   Yes Yes   Sig: Take 0 3 mg by mouth 2 (two) times a day Am-PM    doxazosin (CARDURA) 1 mg tablet   Yes Yes   Sig: Take 1 mg by mouth daily at bedtime   folic acid (FOLVITE) 1 mg tablet Yes Yes   Sig: Take 1 mg by mouth 2 (two) times a day  furosemide (LASIX) 40 mg tablet  Self Yes Yes   Sig: Take 20 mg by mouth daily as needed     hydrALAZINE (APRESOLINE) 50 mg tablet   Yes Yes   Sig: Take 50 mg by mouth 3 (three) times a day  insulin lispro (HumaLOG) 100 units/mL injection   Yes Yes   Sig: Inject 1-5 Units under the skin 3 (three) times a day before meals   metoprolol tartrate (LOPRESSOR) 50 mg tablet   Yes Yes   Sig: Take 50 mg by mouth 2 (two) times a day  pantoprazole (PROTONIX) 40 mg tablet   Yes Yes   Sig: Take 40 mg by mouth daily  pravastatin (PRAVACHOL) 80 mg tablet   Yes Yes   Sig: Take 80 mg by mouth daily  predniSONE 5 mg tablet   Yes Yes   Sig: Take 5 mg by mouth daily     rOPINIRole (REQUIP) 0 25 mg tablet   Yes Yes   Sig: Take 0 25 mg by mouth 2 (two) times a day  sertraline (ZOLOFT) 100 mg tablet   Yes Yes   Sig: Take 100 mg by mouth 2 (two) times a day  sodium bicarbonate 650 mg tablet   Yes Yes   Sig: Take 650 mg by mouth 4 (four) times a day     tacrolimus (PROGRAF) 1 mg capsule   Yes Yes   Sig: Take 4 mg by mouth daily AM    traZODone (DESYREL) 150 mg tablet   Yes Yes   Sig: Take 150 mg by mouth daily at bedtime  Facility-Administered Medications: None       Past Medical History:   Diagnosis Date    Anemia     Cancer ([facility] )     Multiple myeloma    Chronic diastolic (congestive) heart failure 9/18/2017    Diabetes mellitus ([facility] )     Previous, controlled with diet    Disease of thyroid gland     History of transfusion     Hypertension     Night blindness     Psychiatric disorder     Renal disorder     Retinopathy     Status post simultaneous kidney and pancreas transplant ([facility] )     Toe amputation status ([place] [id number] )        Past Surgical History:   Procedure Laterality Date    COMBINED KIDNEY-PANCREAS TRANSPLANT N/A     CYSTOSCOPY N/A 10/13/2016    Procedure: CYSTOSCOPY, retrograde pyelogram, biopsy of ureteral polyp;   Surgeon: Gracie Villalta MD; Location: [facility];  Service:     EYE SURGERY      cataracts    FOOT AMPUTATION THROUGH METATARSAL Left     HALLUX VALGUS CORRECTION Right     NEPHRECTOMY TRANSPLANTED ORGAN         Family History   Problem Relation Age of Onset    Hypertension Mother     Hypertension Father     Cancer Maternal Grandfather     Cancer Paternal Grandmother     Cancer Paternal Grandfather      I have reviewed and agree with the history as documented  Social History   Substance Use Topics    Smoking status: Former Smoker     Quit date: 4/28/2012    Smokeless tobacco: Former User    Alcohol use No        Review of Systems   Constitutional: Negative for diaphoresis, fever and unexpected weight change  HENT: Negative for congestion, rhinorrhea and sore throat  Eyes: Negative for pain, discharge and visual disturbance  Respiratory: Negative for cough, shortness of breath and wheezing  Cardiovascular: Negative for chest pain, palpitations and leg swelling  Gastrointestinal: Negative for abdominal pain, blood in stool, constipation, diarrhea, nausea and vomiting  Genitourinary: Negative for dysuria, flank pain and hematuria  Urinary incontinence  Musculoskeletal: Negative for arthralgias and joint swelling  Skin: Negative for rash and wound  Allergic/Immunologic: Negative for environmental allergies and food allergies  Neurological: Negative for dizziness, seizures, weakness and numbness  Hematological: Negative for adenopathy  Psychiatric/Behavioral: Negative for confusion and hallucinations         Physical Exam  ED Triage Vitals   Temperature Pulse Respirations Blood Pressure SpO2   10/30/17 0909 10/30/17 0820 10/30/17 0820 10/30/17 0820 10/30/17 0820   97 9 °F (36 6 °C) 81 20 (!) 207/84 97 %      Temp Source Heart Rate Source Patient Position - Orthostatic VS BP Location FiO2 (%)   10/30/17 0909 10/30/17 0820 10/30/17 0820 10/30/17 0820 --   Oral Monitor Sitting Left arm       Pain Score 10/30/17 0810       No Pain           Orthostatic Vital Signs  Vitals:    10/30/17 1832 10/30/17 1928 10/30/17 1943 10/30/17 1957   BP: (!) 212/84 (!) 212/86 (!) 202/82 162/68   Pulse: 91 (!) 126 (!) 121 92   Patient Position - Orthostatic VS: Sitting  Sitting Sitting       Physical Exam   Constitutional: She is oriented to person, place, and time  She appears well-developed and well-nourished  No distress  HENT:   Head: Normocephalic and atraumatic  Right Ear: External ear normal    Left Ear: External ear normal    Eyes: Conjunctivae and EOM are normal  Pupils are equal, round, and reactive to light  Cardiovascular: Normal rate, regular rhythm and normal heart sounds  No murmur heard  Pulmonary/Chest: Effort normal and breath sounds normal  No respiratory distress  She has no wheezes  She has no rales  Abdominal: Soft  Bowel sounds are normal  She exhibits no distension  There is no tenderness  There is no guarding  Musculoskeletal: Normal range of motion  She exhibits tenderness  She exhibits no edema or deformity  There is midline tenderness to the lumbar spine  There is mild CVA tenderness bilaterally  Neurological: She is alert and oriented to person, place, and time  She exhibits normal muscle tone  Patient has good rectal tone with normal sensation in the S3, S4, and S5 dermatomes  Skin: Skin is warm and dry  Capillary refill takes less than 2 seconds  Psychiatric: She has a normal mood and affect  Her behavior is normal  Thought content normal    Nursing note and vitals reviewed        ED Medications  Medications   cloNIDine (CATAPRES) tablet 0 3 mg (0 3 mg Oral Given 10/30/17 1902)   DULoxetine (CYMBALTA) delayed release capsule 60 mg (60 mg Oral Given 55/29/32 8277)   folic acid (FOLVITE) tablet 1 mg (1 mg Oral Given 10/30/17 1852)   rOPINIRole (REQUIP) tablet 0 25 mg (0 25 mg Oral Given 10/30/17 1901)   ARIPiprazole (ABILIFY) tablet 20 mg (not administered)   tacrolimus (PROGRAF) capsule 4 mg (not administered)   metoprolol tartrate (LOPRESSOR) tablet 50 mg (50 mg Oral Given 10/30/17 1851)   aspirin chewable tablet 81 mg (not administered)   predniSONE tablet 7 5 mg (not administered)   sertraline (ZOLOFT) tablet 200 mg (not administered)   hydrALAZINE (APRESOLINE) tablet 50 mg (50 mg Oral Given 10/30/17 1851)   cholecalciferol (VITAMIN D3) tablet 1,000 Units (not administered)   levothyroxine tablet 88 mcg (not administered)   sodium bicarbonate tablet 650 mg (not administered)   doxazosin (CARDURA) tablet 1 mg (not administered)   tacrolimus (PROGRAF) capsule 3 mg (not administered)   cloNIDine (CATAPRES) tablet 0 2 mg (not administered)   insulin lispro (HumaLOG) 100 units/mL subcutaneous injection 1-5 Units (1 Units Subcutaneous Not Given 10/30/17 1843)   heparin (porcine) subcutaneous injection 5,000 Units (not administered)   insulin lispro (HumaLOG) 100 units/mL subcutaneous injection 1-6 Units (not administered)   insulin lispro (HumaLOG) 100 units/mL subcutaneous injection 1-5 Units (not administered)   furosemide (LASIX) tablet 20 mg (not administered)   amLODIPine (NORVASC) tablet 10 mg (not administered)   amLODIPine (NORVASC) tablet 5 mg (5 mg Oral Given 10/30/17 1928)   traMADol (ULTRAM) tablet 50 mg (50 mg Oral Given 10/30/17 1928)   labetalol (NORMODYNE) injection 10 mg (10 mg Intravenous Given 10/30/17 1929)   acetaminophen (TYLENOL) tablet 975 mg (975 mg Oral Given 10/30/17 1047)   cefepime (MAXIPIME) 2 g/50 mL dextrose IVPB (0 mg Intravenous Stopped 10/30/17 1459)       Diagnostic Studies  Results Reviewed     Procedure Component Value Units Date/Time    Fingerstick Glucose (POCT) [[id number]]  (Normal) Collected:  10/30/17 1655    Lab Status:  Final result Updated:  10/30/17 1702     POC Glucose 95 mg/dl     Fingerstick Glucose (POCT) [[id number]]  (Normal) Collected:  10/30/17 1305    Lab Status:  Final result Updated:  10/30/17 1306     POC Glucose 90 mg/dl Amylase, random [[id number]] Collected:  10/30/17 0453    Lab Status:  Edited Result - FINAL Specimen:  Urine from Urine, Other Updated:  10/30/17 1217     Amylase, Ur >6,500 0 U/L     Comprehensive metabolic panel [[id number]]  (Abnormal) Collected:  10/30/17 1954    Lab Status:  Final result Specimen:  Blood from Arm, Left Updated:  10/30/17 0946     Sodium 136 mmol/L      Potassium 3 6 mmol/L      Chloride 108 mmol/L      CO2 20 (L) mmol/L      Anion Gap 8 mmol/L      BUN 36 (H) mg/dL      Creatinine 1 79 (H) mg/dL      Glucose 128 mg/dL      Calcium 9 3 mg/dL      AST 15 U/L      ALT 25 U/L      Alkaline Phosphatase 117 (H) U/L      Total Protein 9 7 (H) g/dL      Albumin 3 1 (L) g/dL      Total Bilirubin 0 34 mg/dL      eGFR 32 ml/min/1 73sq m     Narrative:         National Kidney Disease Education Program recommendations are as follows:  GFR calculation is accurate only with a steady state creatinine  Chronic Kidney disease less than 60 ml/min/1 73 sq  meters  Kidney failure less than 15 ml/min/1 73 sq  meters      Lipase [[id number]]  (Normal) Collected:  10/30/17 0922    Lab Status:  Final result Specimen:  Blood from Arm, Left Updated:  10/30/17 0946     Lipase 120 u/L     CBC and differential [[id number]]  (Abnormal) Collected:  10/30/17 0922    Lab Status:  Final result Specimen:  Blood from Arm, Left Updated:  10/30/17 0937     WBC 9 74 Thousand/uL      RBC 3 77 (L) Million/uL      Hemoglobin 11 3 (L) g/dL      Hematocrit 34 4 (L) %      MCV 91 fL      MCH 30 0 pg      MCHC 32 8 g/dL      RDW 16 5 (H) %      MPV 12 1 fL      Platelets 732 Thousands/uL      nRBC 0 /100 WBCs      Neutrophils Relative 76 (H) %      Lymphocytes Relative 16 %      Monocytes Relative 6 %      Eosinophils Relative 1 %      Basophils Relative 1 %      Neutrophils Absolute 7 35 Thousands/µL      Lymphocytes Absolute 1 54 Thousands/µL      Monocytes Absolute 0 58 Thousand/µL      Eosinophils Absolute 0 12 Thousand/µL      Basophils Absolute 0 06 Thousands/µL     Urine Microscopic [[id number]]  (Abnormal) Collected:  10/30/17 0837    Lab Status:  Final result Specimen:  Urine from Urine, Other Updated:  10/30/17 0857     RBC, UA 2-4 (A) /hpf      WBC, UA 10-20 (A) /hpf      Epithelial Cells Occasional /hpf      Bacteria, UA Occasional /hpf      Hyaline Casts, UA 10-25 (A) /lpf     Urine culture [[id number]] Collected:  10/30/17 0837    Lab Status: In process Specimen:  Urine from Urine, Other Updated:  10/30/17 0857    ED Urine Macroscopic [[id number]]  (Abnormal) Collected:  10/30/17 0837    Lab Status:  Final result Specimen:  Urine Updated:  10/30/17 0832     Color, UA Yellow     Clarity, UA Slightly Cloudy     pH, UA 7 0     Leukocytes, UA Large (A)     Nitrite, UA Negative     Protein, UA 30 (1+) (A) mg/dl      Glucose, UA Negative mg/dl      Ketones, UA Negative mg/dl      Urobilinogen, UA 0 2 E U /dl      Bilirubin, UA Negative     Blood, UA Negative     Specific Gravity, UA 1 020    Narrative:       CLINITEK RESULT                 US kidney and bladder with pvr   Final Result by Blu Donovan DO (10/30 1237)   1  Normal sonographic appearance of the transplanted kidney  2   Atrophic native kidneys  3   No evidence of post void residual bladder volume  4   Stable right-sided bladder diverticulum containing calculi  Workstation performed: [id number]               Procedures  Procedures      Phone Consults  ED Phone Contact    ED Course  ED Course as of Oct 30 2206   Mon Oct 30, 2017   8672 UA ordered from triage  Microscopic analysis pending  Leukocytes, UA: (!) Large   [id number] Microscopic analysis demonstrates 10-20 white blood cells per high-power field, 2-4 red blood cells per high-power field, occasional bacteria, occasional epithelial cells, and hyaline casts  2735 Blood Pressure: (!) 178/75   0928 Temperature: 97 9 °F (36 6 °C)   0928 Pulse: 80   0928 SpO2: 97 %   0928 Triage vitals noted  Patient already evaluated  0944 CBC with baseline anemia  3555 CMP stable from prior with slightly improved creatinine  Total protein elevated from baseline  46 Called ultrasound regarding delay in performing study  Awaiting call back  1136 Ultrasound called, will evaluate patient  1227 Markedly elevated  Amylase, Ur: >6,500 0   1303 Nephrology paged  1315 Nephrology recommends empiric antibiotics and admission  They will follow patient  1317 POC Glucose: 90   1318 SLIM paged for admission  MDM  Number of Diagnoses or Management Options  Chronic kidney disease, stage 3: established and worsening  Immunosuppression [facility]):   Renal transplant recipient:   Status post simultaneous kidney and pancreas transplant [facility]):   UTI (urinary tract infection): established and worsening  Diagnosis management comments: UA suggesting UTI  No signs of systemic illness at this time, but patient is immunosuppressed and might be unable to mount significant inflammatory response  Symptoms controlled while on Keflex but return shortly after antibiotics stopped  U/S with bladder diverticulum with calculi  This may represent and nidus of infection; calculi may need to be removed  PVR within normal limits; no neurogenic bladder  Thus, with no radicular pain, saddle anesthesia, and no PVR do not support diagnosis of cauda equina or other serious acute cause of back pain such as spinal epidural abscess, epidural hematoma, acute fracture, and malignancy  Nephrologist Dr William Power called, who discussed case with me  Recommended admission and broad-spectrum antibiotics  Suggested Urology evaluation  Cefepime ordered  Patient admitted to [facility] under Dr Santiago Cruz  Nephrology will follow patient          Amount and/or Complexity of Data Reviewed  Clinical lab tests: ordered and reviewed  Tests in the radiology section of CPT®: ordered and reviewed  Decide to obtain previous medical records or to obtain history from someone other than the patient: yes  Obtain history from someone other than the patient: yes  Review and summarize past medical records: yes  Discuss the patient with other providers: yes  Independent visualization of images, tracings, or specimens: yes    Risk of Complications, Morbidity, and/or Mortality  Presenting problems: moderate  Diagnostic procedures: minimal  Management options: minimal    Patient Progress  Patient progress: stable    CritCare Time    Disposition  Final diagnoses:   Renal transplant recipient   Chronic kidney disease, stage 3   UTI (urinary tract infection)   Immunosuppression ([facility] )   Status post [facility]     Time reflects when diagnosis was documented in both MDM as applicable and the Disposition within this note     Time User Action Codes Description Comment    10/30/2017  1:18 PM Espland, Julio Add [N18 3] Chronic kidney disease, stage 3     10/30/2017  1:18 PM Espland, Julio Modify [N18 3] Chronic kidney disease, stage 3     10/30/2017  1:18 PM Espland, Julio Modify [N18 3] Chronic kidney disease, stage 3     10/30/2017  1:18 PM Espland Julio Remove [N18 3] Chronic kidney disease, stage 3     10/30/2017  1:25 PM Christian Alayna Add [Z94 0] Renal transplant recipient     10/30/2017  1:25 PM Christian Alayna Modify [Z94 0] Renal transplant recipient     10/30/2017  1:25 PM Christian Alayna Modify [Z94 0] Renal transplant recipient     10/30/2017  1:25 PM Christian Alayna Add [N18 3] Chronic kidney disease, stage 3     10/30/2017  1:25 PM Christian Alayna Modify [N18 3] Chronic kidney disease, stage 3     10/30/2017  1:25 PM Christian Alayna Add [N39 0] UTI (urinary tract infection)     10/30/2017  1:25 PM Christian Alayna Modify [N39 0] UTI (urinary tract infection)     10/30/2017  1:25 PM Christian Alayna Modify [N39 0] UTI (urinary tract infection)     10/30/2017  1:25 PM Christian Alayna Add [D89 9] Immunosuppression ([facility] )     10/30/2017 1:25 PM Iker Coad Modify [D89 9] Immunosuppression ([facility] )     10/30/2017  1:34 PM Iker Coad Add [Z94 0,  Z94 83] Status post simultaneous [facility] ([facility] )     10/30/2017  2:25 PM Anthony Marquez A Add [N21 0] Calculus, bladder, diverticulum     10/30/2017  2:25 PM PrashantDevynle A Modify [N21 0] Calculus, bladder, diverticulum     10/30/2017  2:26 PM PrashantDevynle A Add [R32] Urinary incontinence     10/30/2017  2:26 PM Anthony Marquez A Modify [R32] Urinary incontinence       ED Disposition     ED Disposition Condition Comment    Admit  Case was discussed with AJAY and the patient's admission status was agreed to be Admission Status: observation status to the service of Dr Mercedes Spencer  Follow-up Information    None       Current Discharge Medication List      CONTINUE these medications which have NOT CHANGED    Details   acetaminophen (TYLENOL) 325 mg tablet Take 2 tablets (650 mg total) by mouth every 6 (six) hours as needed for mild pain or fever  Qty: 30 tablet, Refills: 0      !! amLODIPine (NORVASC) 5 mg tablet Take 10 mg by mouth daily AM       !! amLODIPine (NORVASC) 5 mg tablet Take 5 mg by mouth daily PM      ARIPiprazole (ABILIFY) 20 MG tablet Take 20 mg by mouth daily  aspirin 81 MG tablet Take 81 mg by mouth daily  Cholecalciferol (VITAMIN D3) 3000 UNITS TABS Take 1 tablet by mouth daily  !! cloNIDine (CATAPRES) 0 2 mg tablet Take 0 2 mg by mouth daily noon      !! cloNIDine (CATAPRES) 0 3 mg tablet Take 0 3 mg by mouth 2 (two) times a day Am-PM       doxazosin (CARDURA) 1 mg tablet Take 1 mg by mouth daily at bedtime      DULoxetine (CYMBALTA) 60 mg delayed release capsule Take 60 mg by mouth 2 (two) times a day  folic acid (FOLVITE) 1 mg tablet Take 1 mg by mouth 2 (two) times a day  furosemide (LASIX) 40 mg tablet Take 20 mg by mouth daily as needed        hydrALAZINE (APRESOLINE) 50 mg tablet Take 50 mg by mouth 3 (three) times a day  Insulin Glargine (TOUJEO SOLOSTAR) 300 UNIT/ML SOPN Inject 10 Units under the skin daily  Refills: 0      insulin lispro (HumaLOG) 100 units/mL injection Inject 1-5 Units under the skin 3 (three) times a day before meals      Lactulose Encephalopathy (GENERLAC PO) Take 30 mL by mouth daily  Levothyroxine Sodium 88 MCG CAPS Take 88 mcg by mouth daily        LORazepam (ATIVAN) 2 mg tablet Take 2 mg by mouth 2 (two) times a day as needed for anxiety        metoprolol tartrate (LOPRESSOR) 50 mg tablet Take 50 mg by mouth 2 (two) times a day  pantoprazole (PROTONIX) 40 mg tablet Take 40 mg by mouth daily  pravastatin (PRAVACHOL) 80 mg tablet Take 80 mg by mouth daily  predniSONE 5 mg tablet Take 5 mg by mouth daily        rOPINIRole (REQUIP) 0 25 mg tablet Take 0 25 mg by mouth 2 (two) times a day  sertraline (ZOLOFT) 100 mg tablet Take 100 mg by mouth 2 (two) times a day  sodium bicarbonate 650 mg tablet Take 650 mg by mouth 4 (four) times a day        !! Tacrolimus (PROGRAF PO) Take 3 mg by mouth daily PM      !! tacrolimus (PROGRAF) 1 mg capsule Take 4 mg by mouth daily AM       traZODone (DESYREL) 150 mg tablet Take 150 mg by mouth daily at bedtime  !! - Potential duplicate medications found  Please discuss with provider  No discharge procedures on file  ED Provider  Attending physically available and evaluated Louise Arvizu  I managed the patient along with the ED Attending      Electronically Signed by         Lalito Lazcano MD  Resident  10/30/17 8746       Lalito Lazcano MD  Resident  10/30/17 9409

## 2017-10-30 NOTE — H&P
History and Physical - [facility] Internal Medicine    Patient Information: Chanelle Galdamez 47 y o  female MRN: [id number]  Unit/Bed#: SPR 2 Encounter: [id number]  Admitting Physician: Milad Sun MD  PCP: Reno Wilson MD  Date of Admission:  10/30/17    Assessment:  Principal Problem:    Urinary incontinence  Active Problems:    Chronic kidney disease, stage 4 (severe) ([facility])    Acquired hypothyroidism    Type 1 diabetes mellitus ([place] [address] )    Status post simultaneous kidney and pancreas transplant ([place] [address] )    Immunosuppression ([place] [address] )    Chronic diastolic congestive heart failure ([facility])    Calculus, bladder, diverticulum - this is chronic and stable, likely contributing to recurrent uti's    Plan:  · Admit to med surg, <2 midnights anticipated  · Hold off on further antibiotics at this time  Patient is systemically well, without fever or leucocytosis  · F/u on urine cx   · Continue home meds  · Urology eval     VTE Prophylaxis: Heparin  / sequential compression device     Code Status: FUll Code    POLST: POLST form is not discussed and not completed at this time  Anticipated Length of Stay:  Patient will be admitted on an Observation basis with an anticipated length of stay of  < 2 midnights  Justification for Hospital Stay: Urinary incontinence    Chief Complaint:     Urinary incontinence    History of Present Illness: Chanelle Galdamez is a 47 y o  female who presents with urinary incontinence  Patient over the past several weeks has been treated with multiple courses of antibiotics for uti  She reports urinary incontinence usually occurs when she has a uti  She developed back pain yesterday  No fever/chills, no dysuria or hematuria  Review of Systems   Constitutional: Negative for chills, diaphoresis, fatigue and fever  HENT: Negative  Respiratory: Negative  Cardiovascular: Negative  Gastrointestinal: Positive for abdominal pain  Negative for constipation, diarrhea, nausea and vomiting  Genitourinary: Positive for flank pain  Negative for difficulty urinating, dysuria and hematuria  Incontinence   Neurological: Negative  Psychiatric/Behavioral: Negative  All other systems reviewed and are negative  Past Medical History:   Diagnosis Date    Anemia     Cancer ([facility] )     Multiple myeloma    Chronic diastolic (congestive) heart failure 9/18/2017    Diabetes mellitus ([place] [address] )     Previous, controlled with diet    Disease of thyroid gland     History of transfusion     Hypertension     Night blindness     Psychiatric disorder     Renal disorder     Retinopathy     Status post simultaneous kidney and pancreas transplant ([place] [address] )     Toe amputation status ([place] [address] )        Past Surgical History:   Procedure Laterality Date    COMBINED KIDNEY-PANCREAS TRANSPLANT N/A     CYSTOSCOPY N/A 10/13/2016    Procedure: CYSTOSCOPY, retrograde pyelogram, biopsy of ureteral polyp; Surgeon: Eloisa Solomon MD;  Location: [facility];  Service:    [facility] EYE SURGERY      cataracts    FOOT AMPUTATION THROUGH METATARSAL Left     HALLUX VALGUS CORRECTION Right     NEPHRECTOMY TRANSPLANTED ORGAN         Family History:  non-contributory    Home Meds:  all medications and allergies reviewed    Allergies:    Allergies   Allergen Reactions    Morphine And Related GI Intolerance    Penicillins Hives     Tolerates cefazolin    Myrbetriq [Mirabegron] Hives         Marital Status: Legally      History   Alcohol Use No     History   Smoking Status    Former Smoker    Quit date: 4/28/2012   Smokeless Tobacco    Former User     History   Drug Use No           Physical Exam:   Vitals:   Blood Pressure: (!) 172/107 (10/30/17 1358)  Pulse: 80 (10/30/17 1358)  Temperature: 97 9 °F (36 6 °C) (10/30/17 0909)  Temp Source: Oral (10/30/17 0909)  Respirations: 18 (10/30/17 1358)  Height: 5' 7 5" (171 5 cm) (10/30/17 0909)  Weight - Scale: 99 8 kg (220 lb) (10/30/17 0909)  SpO2: 93 % (10/30/17 1358)    Physical Exam   Constitutional: She is oriented to person, place, and time  She appears well-developed and well-nourished  No distress  HENT:   Head: Normocephalic and atraumatic  Eyes: Conjunctivae and EOM are normal  Right eye exhibits no discharge  Left eye exhibits no discharge  No scleral icterus  Neck: Normal range of motion  Neck supple  No JVD present  Cardiovascular: Normal rate and regular rhythm  Pulmonary/Chest: Effort normal and breath sounds normal  No respiratory distress  She has no wheezes  She has no rales  Abdominal: Soft  Bowel sounds are normal  She exhibits no distension and no mass  There is tenderness in the left lower quadrant  There is no CVA tenderness  Musculoskeletal: Normal range of motion  She exhibits no edema  Neurological: She is alert and oriented to person, place, and time  She displays tremor  Skin: Skin is warm and dry  She is not diaphoretic  Vitals reviewed  Lab Results: I have personally reviewed pertinent reports  Results from last 7 days  Lab Units 10/30/17  0922   WBC Thousand/uL 9 74   HEMOGLOBIN g/dL 11 3*   HEMATOCRIT % 34 4*   PLATELETS Thousands/uL 196   NEUTROS PCT % 76*   LYMPHS PCT % 16   MONOS PCT % 6   EOS PCT % 1       Results from last 7 days  Lab Units 10/30/17  0922   SODIUM mmol/L 136   POTASSIUM mmol/L 3 6   CHLORIDE mmol/L 108   CO2 mmol/L 20*   BUN mg/dL 36*   CREATININE mg/dL 1 79*   CALCIUM mg/dL 9 3   TOTAL PROTEIN g/dL 9 7*   BILIRUBIN TOTAL mg/dL 0 34   ALK PHOS U/L 117*   ALT U/L 25   AST U/L 15   GLUCOSE RANDOM mg/dL 128           Imaging: I have personally reviewed pertinent reports  Dxa Bone Density Spine Hip And Pelvis    Result Date: 10/17/2017  Narrative: CENTRAL  DXA SCAN CLINICAL HISTORY:   47year old post-menopausal  female with history of myeloma, kidney and pancreas transplant, diabetes mellitus and hypothyroidism  The patient does not exercise and takes calcium and vitamin D supplements    She also takes prednisone  TECHNIQUE: Bone densitometry was performed using a Hologic Horizon A bone densitometer  Regions of interest appear properly placed  There are no obvious fractures or other confounding variables which could limit the study  COMPARISON:  October 2, 2015 and before RESULTS: LUMBAR SPINE:  L1-L4: BMD 1 041 gm/cm2 T-score -0 1 Z-score 0 9 LEFT TOTAL HIP: BMD 0 790 gm/cm2 T-score -1 2 Z-score -0 6 LEFT FEMORAL NECK: BMD 0 715 gm/cm2 T-score -1 2 Z-score -0 2        Impression: 1  Based on the [facility] classification, this study is normal and the patient is considered at low risk for fracture  2   The 10 year risk of hip fracture is 0 5 %, with the 10 year risk of major osteoporotic fracture being 8 3 %, as calculated by the WHO fracture risk assessment tool (FRAX)  The current NOF guidelines recommend treating patients with FRAX 10 year risk  score of >3% for hip fracture and >20% for major osteoporotic fracture  3   Since the prior study, there has been a significant increase in BMD in the left hip of 0 045 Gm/cm2 or 6 1%, with no significant change in the lumbar spine  This change exceeds our own least significant change and, therefore, is statistically significant within 95% confidence level  4   A daily intake of calcium of at least 1200 mg and vitamin D, 800-1000 IU, as well as weight bearing and muscle strengthening exercise, fall prevention and avoidance of tobacco and excessive alcohol intake  5   Repeat DXA  in 18 - 24 months, on the same machine, as clinically indicated  WHO CLASSIFICATION: Normal (a T-score of -1 0 or higher) Low bone mineral density (a T-score of less than -1 0 but higher than -2 5) Osteoporosis (a T-score of -2 5 or less) Severe osteoporosis (a T-score of -2 5 or less with a fragility fracture)   Workstation performed: [id number]     Us Kidney And Bladder With Pvr    Result Date: 10/30/2017  Narrative: RENAL ULTRASOUND INDICATION: Urinary incontinence   COMPARISON: September 13, 2017 TECHNIQUE:   Ultrasound of the retroperitoneum was performed with a curvilinear transducer utilizing volumetric sweeps and still imaging techniques  FINDINGS: KIDNEYS: Native kidneys are atrophic bilaterally  Right kidney:  8 5 x 4 1 cm  The renal parenchyma is diffusely echogenic consistent with medical renal disease  No suspicious masses detected  No hydronephrosis  No shadowing calculi  No perinephric fluid collections  Left kidney:  8 5 x 4 0 cm  The renal parenchyma is diffusely echogenic consistent with medical renal disease  No suspicious masses detected  No hydronephrosis  No shadowing calculi  No perinephric fluid collections  Transplanted kidney:  12 3 x 5 7 cm  Normal echogenicity and contour  No suspicious masses detected  Mild scarring in the lower pole  12 mm simple cortical cyst upper pole  No hydronephrosis  No shadowing calculi  No perinephric fluid collections  The resistive indices are normal  URETERS: Nonvisualized  BLADDER: Normally distended  No focal thickening or mass lesions  Right-sided bladder diverticulum containing calculi, stable from the prior examination  Ureteral jet from the transplanted kidney, is not seen  Prevoid bladder volume measures 426 mL  Post void bladder volume measures 20 mL (normal less than 50 mL)  Impression: 1  Normal sonographic appearance of the transplanted kidney  2   Atrophic native kidneys  3   No evidence of post void residual bladder volume  4   Stable right-sided bladder diverticulum containing calculi  Workstation performed: [id number]       ** Please Note: Dragon 360 Dictation voice to text software may have been used in the creation of this document   **

## 2017-10-30 NOTE — ED NOTES
Pt and mother updated on plan of care and blood work    Patient and family are aware a/w [address] Betsy Farfan RN  10/30/17 2068

## 2017-10-31 LAB
ANION GAP SERPL CALCULATED.3IONS-SCNC: 7 MMOL/L (ref 4–13)
BACTERIA UR CULT: NORMAL
BACTERIA UR QL AUTO: ABNORMAL /HPF
BILIRUB UR QL STRIP: NEGATIVE
BUN SERPL-MCNC: 33 MG/DL (ref 5–25)
CALCIUM SERPL-MCNC: 9.1 MG/DL (ref 8.3–10.1)
CHLORIDE SERPL-SCNC: 111 MMOL/L (ref 100–108)
CLARITY UR: ABNORMAL
CO2 SERPL-SCNC: 21 MMOL/L (ref 21–32)
COLOR UR: YELLOW
CREAT SERPL-MCNC: 1.91 MG/DL (ref 0.6–1.3)
ERYTHROCYTE [DISTWIDTH] IN BLOOD BY AUTOMATED COUNT: 16.9 % (ref 11.6–15.1)
GFR SERPL CREATININE-BSD FRML MDRD: 29 ML/MIN/1.73SQ M
GLUCOSE SERPL-MCNC: 100 MG/DL (ref 65–140)
GLUCOSE SERPL-MCNC: 108 MG/DL (ref 65–140)
GLUCOSE SERPL-MCNC: 114 MG/DL (ref 65–140)
GLUCOSE SERPL-MCNC: 129 MG/DL (ref 65–140)
GLUCOSE SERPL-MCNC: 147 MG/DL (ref 65–140)
GLUCOSE UR STRIP-MCNC: NEGATIVE MG/DL
HCT VFR BLD AUTO: 34.9 % (ref 34.8–46.1)
HGB BLD-MCNC: 11.4 G/DL (ref 11.5–15.4)
HGB UR QL STRIP.AUTO: NEGATIVE
KETONES UR STRIP-MCNC: NEGATIVE MG/DL
LEUKOCYTE ESTERASE UR QL STRIP: ABNORMAL
MCH RBC QN AUTO: 29.5 PG (ref 26.8–34.3)
MCHC RBC AUTO-ENTMCNC: 32.7 G/DL (ref 31.4–37.4)
MCV RBC AUTO: 90 FL (ref 82–98)
NITRITE UR QL STRIP: NEGATIVE
NON-SQ EPI CELLS URNS QL MICRO: ABNORMAL /HPF
OTHER STN SPEC: ABNORMAL
PH UR STRIP.AUTO: 7.5 [PH] (ref 4.5–8)
PLATELET # BLD AUTO: 197 THOUSANDS/UL (ref 149–390)
PMV BLD AUTO: 11.8 FL (ref 8.9–12.7)
POTASSIUM SERPL-SCNC: 3.7 MMOL/L (ref 3.5–5.3)
PROT UR STRIP-MCNC: ABNORMAL MG/DL
RBC # BLD AUTO: 3.86 MILLION/UL (ref 3.81–5.12)
RBC #/AREA URNS AUTO: ABNORMAL /HPF
SODIUM SERPL-SCNC: 139 MMOL/L (ref 136–145)
SP GR UR STRIP.AUTO: 1.01 (ref 1–1.03)
UROBILINOGEN UR QL STRIP.AUTO: 0.2 E.U./DL
WBC # BLD AUTO: 7.25 THOUSAND/UL (ref 4.31–10.16)
WBC #/AREA URNS AUTO: ABNORMAL /HPF

## 2017-10-31 PROCEDURE — 82948 REAGENT STRIP/BLOOD GLUCOSE: CPT

## 2017-10-31 PROCEDURE — 85027 COMPLETE CBC AUTOMATED: CPT | Performed by: INTERNAL MEDICINE

## 2017-10-31 PROCEDURE — 81001 URINALYSIS AUTO W/SCOPE: CPT | Performed by: INTERNAL MEDICINE

## 2017-10-31 PROCEDURE — 80048 BASIC METABOLIC PNL TOTAL CA: CPT | Performed by: INTERNAL MEDICINE

## 2017-10-31 RX ORDER — ACETAMINOPHEN 325 MG/1
650 TABLET ORAL EVERY 6 HOURS PRN
Status: DISCONTINUED | OUTPATIENT
Start: 2017-10-31 | End: 2017-11-02 | Stop reason: HOSPADM

## 2017-10-31 RX ORDER — INSULIN GLARGINE 100 [IU]/ML
20 INJECTION, SOLUTION SUBCUTANEOUS
Status: DISCONTINUED | OUTPATIENT
Start: 2017-10-31 | End: 2017-11-02 | Stop reason: HOSPADM

## 2017-10-31 RX ORDER — CLONIDINE HYDROCHLORIDE 0.3 MG/1
0.3 TABLET ORAL
Status: DISCONTINUED | OUTPATIENT
Start: 2017-10-31 | End: 2017-11-02 | Stop reason: HOSPADM

## 2017-10-31 RX ORDER — CLONIDINE HYDROCHLORIDE 0.2 MG/1
0.2 TABLET ORAL DAILY
Status: DISCONTINUED | OUTPATIENT
Start: 2017-11-01 | End: 2017-11-02 | Stop reason: HOSPADM

## 2017-10-31 RX ORDER — CEPHALEXIN 500 MG/1
500 CAPSULE ORAL EVERY 12 HOURS SCHEDULED
Status: DISCONTINUED | OUTPATIENT
Start: 2017-10-31 | End: 2017-10-31

## 2017-10-31 RX ORDER — LORAZEPAM 1 MG/1
2 TABLET ORAL 2 TIMES DAILY PRN
Status: DISCONTINUED | OUTPATIENT
Start: 2017-10-31 | End: 2017-11-02 | Stop reason: HOSPADM

## 2017-10-31 RX ORDER — PANTOPRAZOLE SODIUM 40 MG/1
40 TABLET, DELAYED RELEASE ORAL
Status: DISCONTINUED | OUTPATIENT
Start: 2017-10-31 | End: 2017-11-02 | Stop reason: HOSPADM

## 2017-10-31 RX ORDER — PRAVASTATIN SODIUM 80 MG/1
80 TABLET ORAL DAILY
Status: DISCONTINUED | OUTPATIENT
Start: 2017-10-31 | End: 2017-11-02 | Stop reason: HOSPADM

## 2017-10-31 RX ORDER — AMLODIPINE BESYLATE 10 MG/1
10 TABLET ORAL DAILY
Status: DISCONTINUED | OUTPATIENT
Start: 2017-10-31 | End: 2017-10-31 | Stop reason: SDUPTHER

## 2017-10-31 RX ADMIN — PRAVASTATIN SODIUM 80 MG: 80 TABLET ORAL at 08:18

## 2017-10-31 RX ADMIN — PANTOPRAZOLE SODIUM 40 MG: 40 TABLET, DELAYED RELEASE ORAL at 08:24

## 2017-10-31 RX ADMIN — HYDRALAZINE HYDROCHLORIDE 50 MG: 50 TABLET, FILM COATED ORAL at 15:13

## 2017-10-31 RX ADMIN — ROPINIROLE 0.25 MG: 0.25 TABLET, FILM COATED ORAL at 17:08

## 2017-10-31 RX ADMIN — INSULIN GLARGINE 20 UNITS: 100 INJECTION, SOLUTION SUBCUTANEOUS at 21:54

## 2017-10-31 RX ADMIN — VITAMIN D, TAB 1000IU (100/BT) 1000 UNITS: 25 TAB at 08:16

## 2017-10-31 RX ADMIN — AMLODIPINE BESYLATE 10 MG: 10 TABLET ORAL at 08:13

## 2017-10-31 RX ADMIN — DULOXETINE HYDROCHLORIDE 60 MG: 60 CAPSULE, DELAYED RELEASE ORAL at 08:20

## 2017-10-31 RX ADMIN — TACROLIMUS 4 MG: 1 CAPSULE ORAL at 08:12

## 2017-10-31 RX ADMIN — HYDRALAZINE HYDROCHLORIDE 50 MG: 50 TABLET, FILM COATED ORAL at 08:19

## 2017-10-31 RX ADMIN — FUROSEMIDE 20 MG: 20 TABLET ORAL at 08:18

## 2017-10-31 RX ADMIN — METOPROLOL TARTRATE 50 MG: 25 TABLET ORAL at 08:19

## 2017-10-31 RX ADMIN — CLONIDINE HYDROCHLORIDE 0.3 MG: 0.3 TABLET ORAL at 15:14

## 2017-10-31 RX ADMIN — FOLIC ACID 1 MG: 1 TABLET ORAL at 17:10

## 2017-10-31 RX ADMIN — PREDNISONE 7.5 MG: 2.5 TABLET ORAL at 08:17

## 2017-10-31 RX ADMIN — ARIPIPRAZOLE 20 MG: 10 TABLET ORAL at 08:21

## 2017-10-31 RX ADMIN — CEFAZOLIN SODIUM 1000 MG: 1 SOLUTION INTRAVENOUS at 23:22

## 2017-10-31 RX ADMIN — SODIUM BICARBONATE 650 MG: 650 TABLET ORAL at 15:17

## 2017-10-31 RX ADMIN — HYDRALAZINE HYDROCHLORIDE 50 MG: 50 TABLET, FILM COATED ORAL at 21:55

## 2017-10-31 RX ADMIN — HEPARIN SODIUM 5000 UNITS: 5000 INJECTION, SOLUTION INTRAVENOUS; SUBCUTANEOUS at 13:31

## 2017-10-31 RX ADMIN — SERTRALINE HYDROCHLORIDE 200 MG: 100 TABLET ORAL at 21:55

## 2017-10-31 RX ADMIN — ROPINIROLE 0.25 MG: 0.25 TABLET, FILM COATED ORAL at 08:14

## 2017-10-31 RX ADMIN — TRAMADOL HYDROCHLORIDE 50 MG: 50 TABLET, FILM COATED ORAL at 00:14

## 2017-10-31 RX ADMIN — DULOXETINE HYDROCHLORIDE 60 MG: 60 CAPSULE, DELAYED RELEASE ORAL at 17:09

## 2017-10-31 RX ADMIN — LEVOTHYROXINE SODIUM 88 MCG: 88 TABLET ORAL at 05:41

## 2017-10-31 RX ADMIN — TRAZODONE HYDROCHLORIDE 150 MG: 100 TABLET ORAL at 21:55

## 2017-10-31 RX ADMIN — HEPARIN SODIUM 5000 UNITS: 5000 INJECTION, SOLUTION INTRAVENOUS; SUBCUTANEOUS at 05:41

## 2017-10-31 RX ADMIN — ASPIRIN 81 MG 81 MG: 81 TABLET ORAL at 08:18

## 2017-10-31 RX ADMIN — FOLIC ACID 1 MG: 1 TABLET ORAL at 08:18

## 2017-10-31 RX ADMIN — DOXAZOSIN 1 MG: 1 TABLET ORAL at 21:55

## 2017-10-31 RX ADMIN — AMLODIPINE BESYLATE 5 MG: 5 TABLET ORAL at 17:09

## 2017-10-31 RX ADMIN — SODIUM BICARBONATE 650 MG: 650 TABLET ORAL at 08:13

## 2017-10-31 RX ADMIN — HEPARIN SODIUM 5000 UNITS: 5000 INJECTION, SOLUTION INTRAVENOUS; SUBCUTANEOUS at 21:55

## 2017-10-31 RX ADMIN — METOPROLOL TARTRATE 50 MG: 25 TABLET ORAL at 17:08

## 2017-10-31 RX ADMIN — CEFAZOLIN SODIUM 1000 MG: 1 SOLUTION INTRAVENOUS at 12:21

## 2017-10-31 RX ADMIN — SODIUM BICARBONATE 650 MG: 650 TABLET ORAL at 21:55

## 2017-10-31 RX ADMIN — CLONIDINE HYDROCHLORIDE 0.2 MG: 0.3 TABLET ORAL at 08:15

## 2017-10-31 NOTE — PLAN OF CARE
Problem: DISCHARGE PLANNING - CARE MANAGEMENT  Goal: Discharge to post-acute care or home with appropriate resources  INTERVENTIONS:  - Conduct assessment to determine patient/family and health care team treatment goals, and need for post-acute services based on payer coverage, community resources, and patient preferences, and barriers to discharge  - Address psychosocial, clinical, and financial barriers to discharge as identified in assessment in conjunction with the patient/family and health care team  - Arrange appropriate level of post-acute services according to patient's   needs and preference and payer coverage in collaboration with the physician and health care team  - Communicate with and update the patient/family, physician, and health care team regarding progress on the discharge plan  - Arrange appropriate transportation to post-acute venues  -Assist patient with making referrals for [facility]   Outcome: Progressing

## 2017-10-31 NOTE — CONSULTS
[address] [place] [address] NOTE   Admission Date: 10/30/2017    Patient Identifiers: Kaitlin Barlow (MRN: [id number])ZELALEM, 47 y o , 1965   Service Requesting Consultation: ALEM Nash  Service Providing Consultation:  Urology, Raz Perdomo PA-C  Consults  Date of Service: 10/31/2017    Reason for Consultation: Urology evaluation    History of Present Illness: Kaitlin Barlow is a 47 y o  female well known to me  She has a history of renal and pancreas transplant and has difficulty with recurrent lower urinary tract symptoms and UTI  Recently she she had an E  Coli UTI and was treated with Ceftin which was sensitive  She complains of back pain and incontinence as her primary symptoms  She presented to the ER with incontinence, back pain, no fever, burning or hematuria  She says she has itching from a yeast infection  A renal ultrasound done was unremarkable  There was no leukocytosis and the creatinine was 1 91 slightly above baseline  Past Medical, Past Surgical History:     Past Medical History:   Diagnosis Date    Anemia     [facility] )     Multiple myeloma    Chronic diastolic (congestive) heart failure 9/18/2017    Diabetes mellitus ([facility] )     Previous, controlled with diet    Disease of thyroid gland     History of transfusion     Hypertension     Night blindness     Psychiatric disorder     Renal disorder     Retinopathy     Status post simultaneous kidney and pancreas [facility] ([facility] )     Toe amputation status ([facility] )    :    Past Surgical History:   Procedure Laterality Date    COMBINED KIDNEY-PANCREAS TRANSPLANT N/A     CYSTOSCOPY N/A 10/13/2016    Procedure: CYSTOSCOPY, retrograde pyelogram, biopsy of ureteral polyp;   Surgeon: Emma Santo MD;  Location: [facility];  Service:    [employer] EYE SURGERY      cataracts    FOOT AMPUTATION THROUGH METATARSAL Left     HALLUX VALGUS CORRECTION Right     NEPHRECTOMY TRANSPLANTED ORGAN :    Medications, Allergies:     Current Facility-Administered Medications:     acetaminophen (TYLENOL) tablet 650 mg, 650 mg, Oral, Q6H PRN, Shameka Maldonado MD    amLODIPine (NORVASC) tablet 10 mg, 10 mg, Oral, Daily, Shameka Maldonado MD, 10 mg at 10/31/17 0813    amLODIPine (NORVASC) tablet 5 mg, 5 mg, Oral, QPM, Shameka Maldonado MD, 5 mg at 10/30/17 1928    ARIPiprazole (ABILIFY) tablet 20 mg, 20 mg, Oral, Daily, Shameka Maldonado MD, 20 mg at 10/31/17 0821    aspirin chewable tablet 81 mg, 81 mg, Oral, Daily, Shameka Maldonado MD, 81 mg at 10/31/17 0818    cholecalciferol (VITAMIN D3) tablet 1,000 Units, 1,000 Units, Oral, Daily, Shameka Maldonado MD, 1,000 Units at 10/31/17 0816    cloNIDine (CATAPRES) tablet 0 2 mg, 0 2 mg, Oral, Daily, Shameka Maldonado MD    cloNIDine (CATAPRES) tablet 0 3 mg, 0 3 mg, Oral, BID, Shameka Maldonado MD, 0 2 mg at 10/31/17 0815    doxazosin (CARDURA) tablet 1 mg, 1 mg, Oral, HS, Shameka Maldonado MD, 1 mg at 10/30/17 2234    DULoxetine (CYMBALTA) delayed release capsule 60 mg, 60 mg, Oral, BID, Shameka Maldonado MD, 60 mg at 93/39/97 3173    folic acid (FOLVITE) tablet 1 mg, 1 mg, Oral, BID, Shameka Maldonado MD, 1 mg at 10/31/17 0818    furosemide (LASIX) tablet 20 mg, 20 mg, Oral, Daily, Shameka Maldonado MD, 20 mg at 10/31/17 0818    heparin (porcine) subcutaneous injection 5,000 Units, 5,000 Units, Subcutaneous, Q8H [address], 5,000 Units at 10/31/17 0541 **AND** Platelet count, , , Once, Shameka Maldonado MD    hydrALAZINE (APRESOLINE) tablet 50 mg, 50 mg, Oral, TID, Shameka Maldonado MD, 50 mg at 10/31/17 0819    insulin glargine (LANTUS) subcutaneous injection 20 Units, 20 Units, Subcutaneous, HS, Shameka Maldonado MD    insulin lispro (HumaLOG) 100 units/mL subcutaneous injection 1-5 Units, 1-5 Units, Subcutaneous, TID AC, Shameka Maldonado MD    insulin lispro (HumaLOG) 100 units/mL subcutaneous injection 1-5 Units, 1-5 Units, Subcutaneous, HS, Shameka Maldonado MD    insulin lispro (HumaLOG) 100 units/mL subcutaneous injection 1-6 Units, 1-6 Units, Subcutaneous, TID AC **AND** Fingerstick Glucose (POCT), , , TID AC, Shannon Cohen MD    labetalol (NORMODYNE) injection 10 mg, 10 mg, Intravenous, Q4H PRN, Praful Aden PA-C, 10 mg at 10/30/17 1929    levothyroxine tablet 88 mcg, 88 mcg, Oral, Early Morning, Shannon Cohen MD, 88 mcg at 10/31/17 0541    LORazepam (ATIVAN) tablet 2 mg, 2 mg, Oral, BID PRN, Shannon Cohen MD    metoprolol tartrate (LOPRESSOR) tablet 50 mg, 50 mg, Oral, BID, Shannon Cohen MD, 50 mg at 10/31/17 0819    pantoprazole (PROTONIX) EC tablet 40 mg, 40 mg, Oral, Early Morning, Shannon Cohen MD, 40 mg at 10/31/17 0824    pravastatin (PRAVACHOL) tablet 80 mg, 80 mg, Oral, Daily, Shannon Cohen MD, 80 mg at 10/31/17 0818    predniSONE tablet 7 5 mg, 7 5 mg, Oral, Daily, Shannon Cohen MD, 7 5 mg at 10/31/17 0817    rOPINIRole (REQUIP) tablet 0 25 mg, 0 25 mg, Oral, BID, Shannon Cohen MD, 0 25 mg at 10/31/17 0814    sertraline (ZOLOFT) tablet 200 mg, 200 mg, Oral, Daily, Shannon Cohen MD    sodium bicarbonate tablet 650 mg, 650 mg, Oral, TID, Shannon Cohen MD, 650 mg at 10/31/17 0813    tacrolimus (PROGRAF) capsule 3 mg, 3 mg, Oral, Daily, Shannon Cohen MD    tacrolimus (PROGRAF) capsule 4 mg, 4 mg, Oral, Daily, Shannon Cohen MD, 4 mg at 10/31/17 1022    traMADol (ULTRAM) tablet 50 mg, 50 mg, Oral, Q6H PRN, Praful Aden PA-C, 50 mg at 10/31/17 0014    traZODone (DESYREL) tablet 150 mg, 150 mg, Oral, HS, Shannon Cohen MD    Allergies:   Allergies   Allergen Reactions    Morphine And Related GI Intolerance    Penicillins Hives     Tolerates cefazolin    Myrbetriq [Mirabegron] Hives   :    Social and Family History:   Social History:   Social History   Substance Use Topics    Smoking status: Former Smoker     Quit date: 4/28/2012    Smokeless tobacco: Former User    Alcohol use No        History   Smoking Status    Former Smoker    Quit date: 4/28/2012   Smokeless Tobacco    Former User Family History:  Family History   Problem Relation Age of Onset    Hypertension Mother     Hypertension Father     Cancer Maternal Grandfather     Cancer Paternal Grandmother     Cancer Paternal Grandfather    :     Review of Systems:     General: Fever, chills, or night sweats: negative  Cardiac: Negative for chest pain  Pulmonary: Negative for shortness of breath  Gastrointestinal: Abdominal pain negative  Nausea, vomiting, or diarrhea negative,  Genitourinary: See HPI above  Patient does not have hematuria  All other systems queried were negative  Physical Exam:   General: Patient is pleasant and in NAD  Awake and alert  /77   Pulse 80   Temp 98 1 °F (36 7 °C) (Oral)   Resp 18   Ht 5' 7 5" (1 715 m)   Wt 98 6 kg (217 lb 5 oz)   SpO2 96%   BMI 33 53 kg/m²   Cardiac: Peripheral edema: negative  Pulmonary: Non-labored breathing  Abdomen: Soft, non-tender, non-distended  No surgical scars  No masses, tenderness, hernias noted  Transplanted kidney is not tender  Genitourinary: Negative CVA tenderness, negative suprapubic tenderness  URINE: clear    Labs:     Lab Results   Component Value Date    HGB 11 4 (L) 10/31/2017    HCT 34 9 10/31/2017    WBC 7 25 10/31/2017     10/31/2017   ]    Lab Results   Component Value Date     10/31/2017    K 3 7 10/31/2017     (H) 10/31/2017    CO2 21 10/31/2017    BUN 33 (H) 10/31/2017    CREATININE 1 91 (H) 10/31/2017    CALCIUM 9 1 10/31/2017    GLUCOSE 147 (H) 10/31/2017   ]    Imaging:   I personally reviewed the images and report of the following studies, and reviewed them with the patient:  RENAL ULTRASOUND     IMPRESSION:  1  Normal sonographic appearance of the transplanted kidney  2   Atrophic native kidneys  3   No evidence of post void residual bladder volume  4   Stable right-sided bladder diverticulum containing calculi  ASSESSMENT:     1  Recurrent urinary tract infection  2  Urine incontinence  3  Kidney/pancreas transplant - pancreatic drainage through the urinary bladder  4  CKD  5  DM I  6  Bladder calculi    PLAN:     - await  sensitivities  - consider laser lithotripsy bladder calculi as outpatient  - outpatient incontinence follow up in our office  - will follow     Thank you for allowing me to participate in this patients care  Please do not hesitate to call with any additional questions    Corinne Wang PA-C

## 2017-10-31 NOTE — SOCIAL WORK
CM met with patient,explained CM role with reintroduction  CM met with patient previous admission  Ptient lives in 1st floor apt with 2-3 JESSI  Patient independent  PTA, including driving, chores and cooking  Patient ambulates with a cane  , also has a walker and shower chair  [facility], and prefers them again if needed on discharge  Patient has [facility] inpatient [facility]  Patient reports having history of pancreas and kidney transplant approx 20 years ago, and reports the 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 stopped functioning, so she has Diabetes again, and has been having urinary incontinence  Patient has prescritpion plan and uses [facility] in [place]  PCP is Dr Osbaldo Henriquez  Patient reports her parents and sister live 10 minutes away, and offer assistance as needed  Patient's father is her POA  Patient has history of depression, medications prescribed by psychiatrist, denies inpatient psych stay or drug and alcohol treatment  Primary  is [facility]'s [facility] [phone]  CM reviewed d/c planning process including the following: identifying help at home, patient preference for d/c planning needs, Discharge Lounge, [facility] Meds to Bed program, availability of treatment team to discuss questions or concerns patient and/or family may have regarding understanding medications and recognizing signs and symptoms once discharged  CM also encouraged patient to follow up with all recommended appointments after discharge  Patient advised of importance for patient and family to participate in managing patients medical well being  CM will follow for discharge needs

## 2017-10-31 NOTE — CONSULTS
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  Recommendations:    Discuss with the primary service  1   It is not entirely clear that her symptoms are definitely related to recurrent E coli UTI  Currently her urine culture is showing mixed contaminants  However she says that in the past her symptoms would dissipate when she is on antibiotics only to recur will when she is off them  2   For now agree with cefazolin 1 g q 12 hours IV pending final cultures and clinical observation to see if her symptoms are alleviated  3  We will discuss with Nephrology the efficacy of possible chronic antibiotic prophylaxis versus the potential hazard in this patient with renal transplant and CKD      HISTORY OF PRESENT ILLNESS:    Reason for Consult:  Recurrent UTI  HPI: Jeff Wang is a 47y o  year old female who is known to the Infectious Disease service with a history of renal and pancreas transplantation and recurring UTIs which have been generally cefazolin susceptible E coli  Patient was last here with this issue from 9/13/17-9/16/17  Her general pattern which continued with this admission is that within approximately a week after stopping her antibiotics (which is usually cephalexin as an outpatient) she develops burning dysuria followed by back pain  She does not have fevers or chills    She also occasionally has a vaginal discharge but this is not always present with her urinary symptoms  To her knowledge she has not ever been placed on chronic antibiotic prophylaxis  Patient has been seen by Urology and asked to schedule for bladder stone lithotripsy as an outpatient  Her current urine culture is showing 40-99500 mixed contaminants  Review of Systems   Constitutional: Positive for activity change and fatigue  Eyes: Positive for visual disturbance (Decreased vision)  Respiratory: Positive for shortness of breath  Cardiovascular: Positive for leg swelling  Gastrointestinal: Positive for constipation  Genitourinary: Positive for dysuria ( burning) and vaginal discharge  Urinary incontinence   Musculoskeletal: Positive for back pain  A [id number] point system-based review of systems is otherwise negative  PAST MEDICAL HISTORY:  Past Medical History:   Diagnosis Date    Anemia     Cancer ([facility] )     Multiple myeloma    Chronic diastolic (congestive) heart failure 9/18/2017    Diabetes mellitus ([place] [address] )     Previous, controlled with diet    Disease of thyroid gland     History of transfusion     Hypertension     Night blindness     Psychiatric disorder     Renal disorder     Retinopathy     Status post simultaneous kidney and pancreas transplant ([facility] )     Toe amputation status ([place] [address] )      Past Surgical History:   Procedure Laterality Date    COMBINED KIDNEY-PANCREAS TRANSPLANT N/A     CYSTOSCOPY N/A 10/13/2016    Procedure: CYSTOSCOPY, retrograde pyelogram, biopsy of ureteral polyp;   Surgeon: Dolores Long MD;  Location: [facility];  Service:    [facility] EYE SURGERY      cataracts    FOOT AMPUTATION THROUGH METATARSAL Left     HALLUX VALGUS CORRECTION Right     NEPHRECTOMY TRANSPLANTED ORGAN         FAMILY HISTORY:  Non-contributory    SOCIAL HISTORY:  Social History   Legally   History   Alcohol Use No     History   Drug Use No     History   Smoking Status    Former Smoker    Quit date: 4/28/2012   Smokeless Tobacco    Former User ALLERGIES:  Allergies   Allergen Reactions    Morphine And Related GI Intolerance    Penicillins Hives     Tolerates cefazolin    Myrbetriq [Mirabegron] Hives       MEDICATIONS:  All current active medications have been reviewed  PHYSICAL EXAM:  HR:  [] 79  Resp:  [18] 18  BP: (154-212)/(66-86) 173/74  SpO2:  [91 %-96 %] 96 %  Temp (24hrs), Av 3 °F (36 8 °C), Min:98 1 °F (36 7 °C), Max:98 5 °F (36 9 °C)  Current: Temperature: 98 5 °F (36 9 °C)    Intake/Output Summary (Last 24 hours) at 10/31/17 1851  Last data filed at 10/31/17 1140   Gross per 24 hour   Intake              460 ml   Output                0 ml   Net              460 ml       General Appearance:  Appearing well, chronically ill female who was nontoxic, and in no distress, appears stated age   Head:  Normocephalic, without obvious abnormality, atraumatic   Eyes:  PERRL, conjunctiva pink and sclera anicteric, both eyes, vision grossly decreased   Nose: Nares normal, mucosa normal, no drainage   Throat: Oropharynx moist without lesions; lips, mucosa, and tongue normal; teeth and gums normal   Neck: Supple, symmetrical, trachea midline, no adenopathy, no tenderness/mass/nodules   Back:   Symmetric, no curvature, ROM normal, no CVA tenderness   Lungs:   Clear to auscultation bilaterally, no audible wheezes, rhonchi and rales, respirations unlabored   Chest Wall:  No tenderness or deformity   Heart:  Regular rate and rhythm, S1, S2 normal, no murmur, rub or gallop   Abdomen:   Soft, non-tender, non-distended, positive bowel sounds, no masses, no organomegaly, surgical scars    No CVA tenderness, palpable transplant kidney   Extremities: Pulses decreased, S/P left transmetatarsal amputation and right hallux amputation     Skin: Surgical scars, as above   Neurologic: Alert and oriented times 3, extremity strength 5/5 and symmetric           Invasive Devices:   Peripheral IV 10/30/17 Left Antecubital (Active)   Site Assessment Clean;Dry; Intact 10/31/2017 10:00 AM   Dressing Type Transparent 10/31/2017 10:00 AM   Line Status Saline locked 10/31/2017 10:00 AM   Dressing Status Clean;Dry; Intact 10/31/2017 10:00 AM   Dressing Change Due 11/03/17 10/30/2017  7:00 PM       LABS, IMAGING, & OTHER STUDIES:  Lab Results:      I have personally reviewed pertinent labs  Results from last 7 days  Lab Units 10/31/17  0630 10/30/17  0922   WBC Thousand/uL 7 25 9 74   HEMOGLOBIN g/dL 11 4* 11 3*   PLATELETS Thousands/uL 197 196       Results from last 7 days  Lab Units 10/31/17  0630 10/30/17  0922   SODIUM mmol/L 139 136   POTASSIUM mmol/L 3 7 3 6   CHLORIDE mmol/L 111* 108   CO2 mmol/L 21 20*   ANION GAP mmol/L 7 8   BUN mg/dL 33* 36*   CREATININE mg/dL 1 91* 1 79*   EGFR ml/min/1 73sq m 29 32   GLUCOSE RANDOM mg/dL 147* 128   CALCIUM mg/dL 9 1 9 3   AST U/L  --  15   ALT U/L  --  25   ALK PHOS U/L  --  117*   TOTAL PROTEIN g/dL  --  9 7*   ALBUMIN g/dL  --  3 1*   BILIRUBIN TOTAL mg/dL  --  0 34       Results from last 7 days  Lab Units 10/30/17  0837   URINE CULTURE  40,000-49,000 cfu/ml        Imaging Studies:   I have personally reviewed pertinent imaging study reports and images in PACS  EKG, Pathology, and Other Studies:   I have personally reviewed pertinent reports

## 2017-10-31 NOTE — CASE MANAGEMENT
Initial Clinical Review    Admission: Date/Time/Statement:   10/30/17 AT  1336 OBSERVATION    Orders Placed This Encounter   Procedures    Place in Observation (expected length of stay for this patient is less than two midnights)     Standing Status:   Standing     Number of Occurrences:   1     Order Specific Question:   Admitting Physician     Answer:   Sudheer Solomon [[id number]]     Order Specific Question:   Level of Care     Answer:   Med Surg [16]     ED: Date/Time/Mode of Arrival:   ED Arrival Information     Expected Arrival Acuity Means of Arrival Escorted By Service Admission Type    - 10/30/2017 08:04 Urgent Walk-In Family Member General Medicine Urgent    Arrival Complaint    incontinence          Chief Complaint:   Chief Complaint   Patient presents with    Urinary Incontinence     Pt has recent UTI, treated with Keflex, total of 3 rounds, UTI symptoms continue  Chief Complaint:     Urinary incontinence     History of Present Illness: Sidney Smith is a 47 y o  female who presents with urinary incontinence  Patient over the past several weeks has been treated with multiple courses of antibiotics for uti  She reports urinary incontinence usually occurs when she has a uti  She developed back pain yesterday  No fever/chills, no dysuria or hematuria      Review of Systems   Constitutional: Negative for chills, diaphoresis, fatigue and fever  HENT: Negative  Respiratory: Negative  Cardiovascular: Negative  Gastrointestinal: Positive for abdominal pain  Negative for constipation, diarrhea, nausea and vomiting  Genitourinary: Positive for flank pain  Negative for difficulty urinating, dysuria and hematuria  Incontinence   Neurological: Negative  Psychiatric/Behavioral: Negative  All other systems reviewed and are negative  Chief Complaint:     Urinary incontinence     History of Present Illness: Sidney Smith is a 47 y o  female who presents with urinary incontinence   Patient over the past several weeks has been treated with multiple courses of antibiotics for uti  She reports urinary incontinence usually occurs when she has a uti  She developed back pain yesterday  No fever/chills, no dysuria or hematuria      Review of Systems   Constitutional: Negative for chills, diaphoresis, fatigue and fever  Respiratory: Negative  Cardiovascular: Negative  Gastrointestinal: Positive for abdominal pain  Negative for constipation, diarrhea, nausea and vomiting  Genitourinary: Positive for flank pain  Negative for difficulty urinating, dysuria and hematuria  Incontinence       ED Vital Signs:   ED Triage Vitals   Temperature Pulse Respirations Blood Pressure SpO2   10/30/17 0909 10/30/17 0820 10/30/17 0820 10/30/17 0820 10/30/17 0820   97 9 °F (36 6 °C) 81 20 (!) 207/84 97 %      Temp Source Heart Rate Source Patient Position - Orthostatic VS BP Location FiO2 (%)   10/30/17 0909 10/30/17 0820 10/30/17 0820 10/30/17 0820 --   Oral Monitor Sitting Left arm       Pain Score       10/30/17 0810       No Pain        Wt Readings from Last 1 Encounters:   10/30/17 98 6 kg (217 lb 5 oz)      10/30 0701  10/31 0700 10/31 0701  10/31 0925  Most Recent    Temperature (°F) 97  [id number]  3    98 1 (36 7)    Pulse 72126    80    Respirations 1820    18    Blood Pressure 154/66212/86    170/77    SpO2 (%) 9197    96        LABS/Diagnostic Test Results:   CBC  Results from last 7 days  Lab Units 10/30/17  0922   WBC Thousand/uL 9 74   HEMOGLOBIN g/dL 11 3*   HEMATOCRIT % 34 4*   PLATELETS Thousands/uL 196   NEUTROS PCT % 76*   LYMPHS PCT % 16   MONOS PCT % 6   EOS PCT % 1         CMP  Results from last 7 days  Lab Units 10/30/17  0922   SODIUM mmol/L 136   POTASSIUM mmol/L 3 6   CHLORIDE mmol/L 108   CO2 mmol/L 20*   BUN mg/dL 36*   CREATININE mg/dL 1 79*   CALCIUM mg/dL 9 3   TOTAL PROTEIN g/dL 9 7*   BILIRUBIN TOTAL mg/dL 0 34   ALK PHOS U/L 117*   ALT U/L 25   AST U/L 15   GLUCOSE RANDOM mg/dL 128 ED Urine Macroscopic [[id number]] (Abnormal) Collected: 10/30/17 0837   Lab Status: Final result Specimen: Urine Updated: 10/30/17 0832    Color, UA Yellow    Clarity, UA Slightly Cloudy    pH, UA 7 0 4 5 - 8 0     Leukocytes, UA Large (A) Negative     Nitrite, UA Negative Negative     Protein, UA 30 (1+) (A) Negative mg/dl     Glucose, UA Negative Negative mg/dl     Ketones, UA Negative Negative mg/dl     Urobilinogen, UA 0 2 0 2, 1 0 E U /dl E U /dl     Bilirubin, UA Negative Negative     Blood, UA Negative Negative     Specific Gravity, UA 1 020 1 003 - 1 030          Urine Microscopic [[id number]] (Abnormal) Collected: 10/30/17 0837   Lab Status: Final result Specimen: Urine from Urine, Other Updated: 10/30/17 0857    RBC, UA 2-4 (A) None Seen, 0-5 /hpf     WBC, UA 10-20 (A) None Seen, 0-5, 5-55, 5-65 /hpf     Epithelial Cells Occasional None Seen, Occasional /hpf     Bacteria, UA Occasional None Seen, Occasional /hpf     Hyaline Casts, UA 10-25 (A) None Seen /lpf      URINE CULTURE PENDING    RENAL  ULTRASOUND -  1  Normal sonographic appearance of the transplanted kidney  2   Atrophic native kidneys  3   No evidence of post void residual bladder volume  4   Stable right-sided bladder diverticulum containing calculi  ED Treatment:   Medication Administration from 10/30/2017 0804 to 10/30/2017 1826       Date/Time Order Dose Route Action Action by Comments     10/30/2017 1047 acetaminophen (TYLENOL) tablet 975 mg 975 mg Oral Given Milind Valladares RN      10/30/2017 1407 piperacillin-tazobactam (ZOSYN) 3 375 g in sodium chloride 0 9 % 50 mL IVPB   Intravenous Canceled Entry Milind Valladares RN Discontinued  10/30/2017 1459 cefepime (MAXIPIME) 2 g/50 mL dextrose IVPB 0 mg Intravenous Stopped To Noguera RN      10/30/2017 1413 cefepime (MAXIPIME) 2 g/50 mL dextrose IVPB 2,000 mg Intravenous New Bag Milind Valladares RN           Past Medical/Surgical History:    Active Ambulatory Problems Diagnosis Date Noted    Renal transplant recipient 04/15/2016    Chronic kidney disease, stage 4 (severe) ([place] [id number] ) 04/15/2016    UTI (urinary tract infection) 04/29/2016    Acquired hypothyroidism 04/29/2016    Benign hypertension with CKD (chronic kidney disease) stage IV ([place] [address] ) 04/29/2016    Acute encephalopathy 07/12/2016    Toxic metabolic encephalopathy 54/18/4052    UTI (urinary tract infection) 09/13/2016    Type 1 diabetes mellitus (HCC)     Hypertension     Anemia     Status post simultaneous kidney and pancreas transplant ([place] [address] )     Immunosuppression ([place] [address] ) 02/09/2017    Chronic diastolic congestive heart failure ([place] [address] ) 09/18/2017     Resolved Ambulatory Problems     Diagnosis Date Noted    Hyperglycemia 04/15/2016    ARF (acute renal failure) ([place] [address] ) 04/15/2016    Acute cystitis 04/18/2016    Dysuria 08/01/2016    EDUARDO (acute kidney injury) ([place] [address] ) 09/13/2016    Hypokalemia 09/17/2016    Low bicarbonate level 02/09/2017    Abdominal pain 06/14/2017    Pyelonephritis 06/14/2017    Weakness 06/14/2017     Past Medical History:   Diagnosis Date    Anemia     Cancer ([place] [address] )     Chronic diastolic (congestive) heart failure 9/18/2017    Diabetes mellitus ([place] [address] )     Disease of thyroid gland     History of transfusion     Hypertension     Night blindness     Psychiatric disorder     Renal disorder     Retinopathy     Status post simultaneous kidney and pancreas transplant ([place] [id number] )     Toe amputation status ([facility])        Admitting Diagnosis: Incontinence [R32]  UTI (urinary tract infection) [N39 0]  Urinary incontinence [R32]  Calculus, bladder, diverticulum [N21 0]  Immunosuppression ([place] [address] ) [D89 9]  Chronic kidney disease, stage 3 [N18 3]  Status post simultaneous kidney and pancreas transplant ([place] [address] ) [Z94 0, Z94 83]  Renal transplant recipient [Z94 0]    Age/Sex: 47 y o  female    Assessment/Plan:  Assessment:  Principal Problem:    Urinary incontinence  Active Problems:    Chronic kidney disease, stage 4 (severe) (HCC)    Acquired hypothyroidism    Type 1 diabetes mellitus ([facility] )    Status post simultaneous kidney and pancreas transplant ([facility] )    Immunosuppression (HCC)    Chronic diastolic congestive heart failure (HCC)    Calculus, bladder, diverticulum - this is chronic and stable, likely contributing to recurrent uti's     Plan:  · Admit to med surg, <2 midnights anticipated  · Hold off on further antibiotics at this time  Patient is systemically well, without fever or leucocytosis  · F/u on urine cx   · Continue home meds  · Urology eval      VTE Prophylaxis: Heparin  / sequential compression device      Code Status: FUll Code     POLST: POLST form is not discussed and not completed at this time      Anticipated Length of Stay:  Patient will be admitted on an Observation basis with an anticipated length of stay of  < 2 midnights       Justification for Hospital Stay: Urinary incontinence        Admission Orders:  10/30/17 AT  1336 OBSERVATION  VS Q4HRS    Up as Tolerated  SCD    Diet Binu/CHO Controlled; Consistent Carbohydrate Diet Level 2 (5 carb servings/75 grams CHO/meal)      IV ACCESS    Scheduled Meds:   amLODIPine 10 mg Oral Daily   amLODIPine 5 mg Oral QPM   ARIPiprazole 20 mg Oral Daily   aspirin 81 mg Oral Daily   cholecalciferol 1,000 Units Oral Daily   cloNIDine 0 2 mg Oral Daily   cloNIDine 0 3 mg Oral BID   doxazosin 1 mg Oral HS   DULoxetine 60 mg Oral BID   folic acid 1 mg Oral BID   furosemide 20 mg Oral Daily   heparin (porcine) 5,000 Units Subcutaneous Q8H [address]   hydrALAZINE 50 mg Oral TID   insulin glargine 20 Units Subcutaneous HS   insulin lispro 1-5 Units Subcutaneous TID AC   insulin lispro 1-5 Units Subcutaneous HS   insulin lispro 1-6 Units Subcutaneous TID AC   levothyroxine 88 mcg Oral Early Morning   metoprolol tartrate 50 mg Oral BID   pantoprazole 40 mg Oral Early Morning   pravastatin 80 mg Oral Daily   predniSONE 7 5 mg Oral Daily   rOPINIRole 0 25 mg Oral BID sertraline 200 mg Oral Daily   sodium bicarbonate 650 mg Oral TID   tacrolimus 3 mg Oral Daily   tacrolimus 4 mg Oral Daily   traZODone 150 mg Oral HS     PRN Meds:     acetaminophen    IV labetalol 10 mg q4hrs prn SBP > 180 given x 1    LORazepam    traMADol 50 mg q4hrs prn given x 2

## 2017-10-31 NOTE — RESTORATIVE TECHNICIAN NOTE
Restorative Specialist Mobility Note       Activity: Ambulate in barron     Assistive Device: [employer]

## 2017-10-31 NOTE — PROGRESS NOTES
[facility]ist Service - Internal Medicine Progress Note  Patient: Mauricio Luna 47 y o  female   MRN: [id number]  PCP: Ji Azevedo MD  Unit/Bed#: MS [facility] Encounter: [id number]  Date Of Visit: 10/31/17         Subjective:   No overnight events  Patient frustrated at the thought of recurrent UTIs  Complains of mild back pain but no other complaints at this time  Objective:     Vitals:   Temp (24hrs), Av 2 °F (36 8 °C), Min:98 1 °F (36 7 °C), Max:98 3 °F (36 8 °C)    HR:  [] 74  Resp:  [18-20] 18  BP: (154-212)/() 174/70  SpO2:  [91 %-97 %] 96 %  Body mass index is 33 53 kg/m²  Input and Output Summary (last 24 hours):        Intake/Output Summary (Last 24 hours) at 10/31/17 1356  Last data filed at 10/31/17 1140   Gross per 24 hour   Intake              460 ml   Output                0 ml   Net              460 ml       Physical Exam:     GENERAL:  Well-developed/nourished - no acute distress  HEAD:  Normocephalic - atraumatic  EYES: PERRL - EOMI   MOUTH:  Mucosa moist  NECK:  Supple - full range of motion  CARDIAC:  Regular rate/rhythm - S1/S2 positive  PULMONARY:  Clear breath sounds bilaterally - nonlabored respirations  ABDOMEN:  Soft - nontender/nondistended - active bowel sounds  MUSCULOSKELETAL:  Motor strength/range of motion intact  NEUROLOGIC:  Alert/oriented x 3  SKIN:  Age-appropriate wrinkles/blemishes   PSYCHIATRIC:  Somewhat anxious       Additional Data:     Labs & Recent Cultures:       Results from last 7 days  Lab Units 10/31/17  0630 10/30/17  0922   WBC Thousand/uL 7 25 9 74   HEMOGLOBIN g/dL 11 4* 11 3*   HEMATOCRIT % 34 9 34 4*   PLATELETS Thousands/uL 197 196   NEUTROS PCT %  --  76*   LYMPHS PCT %  --  16   MONOS PCT %  --  6   EOS PCT %  --  1       Results from last 7 days  Lab Units 10/31/17  0630 10/30/17  0922   SODIUM mmol/L 139 136   POTASSIUM mmol/L 3 7 3 6   CHLORIDE mmol/L 111* 108   CO2 mmol/L 21 20*   BUN mg/dL 33* 36*   CREATININE mg/dL 1 91* 1 79*   CALCIUM mg/dL 9 1 9 3   TOTAL PROTEIN g/dL  --  9 7*   BILIRUBIN TOTAL mg/dL  --  0 34   ALK PHOS U/L  --  117*   ALT U/L  --  25   AST U/L  --  15   GLUCOSE RANDOM mg/dL 147* 128               Results from last 7 days  Lab Units 10/30/17  0837   URINE CULTURE  40,000-49,000 cfu/ml          Last 24 Hours Medication List:     amLODIPine 10 mg Oral Daily   amLODIPine 5 mg Oral QPM   ARIPiprazole 20 mg Oral Daily   aspirin 81 mg Oral Daily   cefazolin 1,000 mg Intravenous Q12H   cholecalciferol 1,000 Units Oral Daily   [START ON 11/1/2017] cloNIDine 0 2 mg Oral Daily   cloNIDine 0 3 mg Oral After Lunch   cloNIDine 0 3 mg Oral After Dinner   doxazosin 1 mg Oral HS   DULoxetine 60 mg Oral BID   folic acid 1 mg Oral BID   furosemide 20 mg Oral Daily   heparin (porcine) 5,000 Units Subcutaneous Q8H [facility]   hydrALAZINE 50 mg Oral TID   insulin glargine 20 Units Subcutaneous HS   insulin lispro 1-6 Units Subcutaneous TID AC   levothyroxine 88 mcg Oral Early Morning   metoprolol tartrate 50 mg Oral BID   pantoprazole 40 mg Oral Early Morning   pravastatin 80 mg Oral Daily   predniSONE 7 5 mg Oral Daily   rOPINIRole 0 25 mg Oral BID   sertraline 200 mg Oral Daily   sodium bicarbonate 650 mg Oral TID   tacrolimus 3 mg Oral Daily   tacrolimus 4 mg Oral Daily   traZODone 150 mg Oral HS         Assessments:    1  Urinary Incontinence - Possible Recurrent UTI - Bladder Calculi   Plan:   · Appreciate urology input - recommend outpatient followup for lithotripsy  · Previous urine cultures this year noted for E  Coli susceptible to cephaloporins - on IV Ancef currently - will appreciate ID input due to recurrence in a transplant patient - will recheck urine culture as yesterday's sample was contaminated   · Patient also notes occasional "yeast infections" with prior episodes of UTI for which she has taken Monistat in the past     2    Status post Renal & Pancreatic Transplants   Plan:   · Continue immunosuppressive therapy w/ Prograf/Prednisone   · Outpatient followup     3  Acute Kidney Injury - Chronic Kidney Disease stage 3   Plan:   · Baseline creatinine of approximately 1 6-1 8 - currently @ 1 91   · Possible rise due to UTI coupled w/ uncontrolled BP in the setting of chronic diabetes   · Avoid nephrotoxins IF possible      4  Accelerated Hypertension   Plan:   · Adjusted Clonidine regimen after discussion with patient - c/w Cardura/Lopressor/Norvasc otherwise   · Patient reassured   · Pain control     5  Hypothyroidism   Plan:   · C/w Synthroid - free T4 normal     6  Insulin Dependent Diabetes Mellitus   Plan:   · HgA1c well-controlled @ 5 3   · C/w basal insulin w/ PRN SSI coverage per accuchecks     4  Anemia of Chronic Disease   Plan:   · Stable Hgb - continue to monitor         VTE Prophylaxis:  Heparin SC        Time Spent for Care: 32 minutes  More than 50% of total time spent on counseling and coordination of care as described above  Current Length of Stay: 0 day(s)      Code Status: Level 1 - Full Code       Today, Patient Was Seen By: Edinson Henriquez MD    ** Please Note: This note has been constructed using a voice recognition system   **

## 2017-10-31 NOTE — PLAN OF CARE
Problem: Potential for Falls  Goal: Patient will remain free of falls  INTERVENTIONS:  - Assess patient frequently for physical needs  -  Identify cognitive and physical deficits and behaviors that affect risk of falls    -  [place] fall precautions as indicated by assessment   - Educate patient/family on patient safety including physical limitations  - Instruct patient to call for assistance with activity based on assessment  - Modify environment to reduce risk of injury  - Consider OT/PT consult to assist with strengthening/mobility    Outcome: Progressing      Problem: PAIN - ADULT  Goal: Verbalizes/displays adequate comfort level or baseline comfort level  Interventions:  - Encourage patient to monitor pain and request assistance  - Assess pain using appropriate pain scale  - Administer analgesics based on type and severity of pain and evaluate response  - Implement non-pharmacological measures as appropriate and evaluate response  - Consider cultural and social influences on pain and pain management  - Notify physician/advanced practitioner if interventions unsuccessful or patient reports new pain   Outcome: Progressing      Problem: INFECTION - ADULT  Goal: Absence or prevention of progression during hospitalization  INTERVENTIONS:  - Assess and monitor for signs and symptoms of infection  - Monitor lab/diagnostic results  - Monitor all insertion sites, i e  indwelling lines, tubes, and drains  - Monitor endotracheal (as able) and nasal secretions for changes in amount and color  - [place] appropriate cooling/warming therapies per order  - Administer medications as ordered  - Instruct and encourage patient and family to use good hand hygiene technique  - Identify and instruct in appropriate isolation precautions for identified infection/condition   Outcome: Progressing      Problem: SAFETY ADULT  Goal: Maintain or return to baseline ADL function  INTERVENTIONS:  -  Assess patient's ability to carry out ADLs; assess patient's baseline for ADL function and identify physical deficits which impact ability to perform ADLs (bathing, care of mouth/teeth, toileting, grooming, dressing, etc )  - Assess/evaluate cause of self-care deficits   - Assess range of motion  - Assess patient's mobility; develop plan if impaired  - Assess patient's need for assistive devices and provide as appropriate  - Encourage maximum independence but intervene and supervise when necessary  ¯ Involve family in performance of ADLs  ¯ Assess for home care needs following discharge   ¯ Request OT consult to assist with ADL evaluation and planning for discharge  ¯ Provide patient education as appropriate   Outcome: Progressing    Goal: Maintain or return mobility status to optimal level  INTERVENTIONS:  - Assess patient's baseline mobility status (ambulation, transfers, stairs, etc )    - Identify cognitive and physical deficits and behaviors that affect mobility  - Identify mobility aids required to assist with transfers and/or ambulation (gait belt, sit-to-stand, lift, walker, cane, etc )  - [place] fall precautions as indicated by assessment  - Record patient progress and toleration of activity level on Mobility SBAR; progress patient to next Phase/Stage  - Instruct patient to call for assistance with activity based on assessment  - Request Rehabilitation consult to assist with strengthening/weightbearing, etc    Outcome: Progressing    Goal: Patient will remain free of falls  INTERVENTIONS:  - Assess patient frequently for physical needs  -  Identify cognitive and physical deficits and behaviors that affect risk of falls    -  [place] fall precautions as indicated by assessment   - Educate patient/family on patient safety including physical limitations  - Instruct patient to call for assistance with activity based on assessment  - Modify environment to reduce risk of injury  - Consider OT/PT consult to assist with strengthening/mobility Outcome: Progressing

## 2017-11-01 PROBLEM — R32 URINARY INCONTINENCE: Status: RESOLVED | Noted: 2017-10-30 | Resolved: 2017-11-01

## 2017-11-01 LAB
BASOPHILS # BLD AUTO: 0.06 THOUSANDS/ΜL (ref 0–0.1)
BASOPHILS NFR BLD AUTO: 1 % (ref 0–1)
CREAT SERPL-MCNC: 1.69 MG/DL (ref 0.6–1.3)
EOSINOPHIL # BLD AUTO: 0.36 THOUSAND/ΜL (ref 0–0.61)
EOSINOPHIL NFR BLD AUTO: 5 % (ref 0–6)
ERYTHROCYTE [DISTWIDTH] IN BLOOD BY AUTOMATED COUNT: 17 % (ref 11.6–15.1)
EST. AVERAGE GLUCOSE BLD GHB EST-MCNC: 111 MG/DL
GFR SERPL CREATININE-BSD FRML MDRD: 34 ML/MIN/1.73SQ M
GLUCOSE SERPL-MCNC: 100 MG/DL (ref 65–140)
GLUCOSE SERPL-MCNC: 109 MG/DL (ref 65–140)
GLUCOSE SERPL-MCNC: 134 MG/DL (ref 65–140)
GLUCOSE SERPL-MCNC: 150 MG/DL (ref 65–140)
GLUCOSE SERPL-MCNC: 74 MG/DL (ref 65–140)
HBA1C MFR BLD: 5.5 % (ref 4.2–6.3)
HCT VFR BLD AUTO: 34.8 % (ref 34.8–46.1)
HGB BLD-MCNC: 11.3 G/DL (ref 11.5–15.4)
LYMPHOCYTES # BLD AUTO: 1.64 THOUSANDS/ΜL (ref 0.6–4.47)
LYMPHOCYTES NFR BLD AUTO: 21 % (ref 14–44)
MCH RBC QN AUTO: 29.2 PG (ref 26.8–34.3)
MCHC RBC AUTO-ENTMCNC: 32.5 G/DL (ref 31.4–37.4)
MCV RBC AUTO: 90 FL (ref 82–98)
MONOCYTES # BLD AUTO: 0.59 THOUSAND/ΜL (ref 0.17–1.22)
MONOCYTES NFR BLD AUTO: 8 % (ref 4–12)
NEUTROPHILS # BLD AUTO: 5.12 THOUSANDS/ΜL (ref 1.85–7.62)
NEUTS SEG NFR BLD AUTO: 65 % (ref 43–75)
NRBC BLD AUTO-RTO: 0 /100 WBCS
PLATELET # BLD AUTO: 201 THOUSANDS/UL (ref 149–390)
PMV BLD AUTO: 12 FL (ref 8.9–12.7)
RBC # BLD AUTO: 3.87 MILLION/UL (ref 3.81–5.12)
WBC # BLD AUTO: 7.78 THOUSAND/UL (ref 4.31–10.16)

## 2017-11-01 PROCEDURE — 82565 ASSAY OF CREATININE: CPT | Performed by: INTERNAL MEDICINE

## 2017-11-01 PROCEDURE — 82948 REAGENT STRIP/BLOOD GLUCOSE: CPT

## 2017-11-01 PROCEDURE — 83036 HEMOGLOBIN GLYCOSYLATED A1C: CPT | Performed by: INTERNAL MEDICINE

## 2017-11-01 PROCEDURE — 85025 COMPLETE CBC W/AUTO DIFF WBC: CPT | Performed by: INTERNAL MEDICINE

## 2017-11-01 RX ORDER — PREDNISONE 1 MG/1
5 TABLET ORAL DAILY
Status: DISCONTINUED | OUTPATIENT
Start: 2017-11-02 | End: 2017-11-02 | Stop reason: HOSPADM

## 2017-11-01 RX ORDER — SULFAMETHOXAZOLE AND TRIMETHOPRIM 400; 80 MG/1; MG/1
1 TABLET ORAL ONCE
Status: COMPLETED | OUTPATIENT
Start: 2017-11-01 | End: 2017-11-01

## 2017-11-01 RX ORDER — SULFAMETHOXAZOLE AND TRIMETHOPRIM 400; 80 MG/1; MG/1
1 TABLET ORAL EVERY OTHER DAY
Qty: 40 TABLET | Refills: 0 | Status: SHIPPED | OUTPATIENT
Start: 2017-11-01 | End: 2017-11-20 | Stop reason: HOSPADM

## 2017-11-01 RX ORDER — TACROLIMUS 1 MG/1
3 CAPSULE ORAL
Status: DISCONTINUED | OUTPATIENT
Start: 2017-11-01 | End: 2017-11-02 | Stop reason: HOSPADM

## 2017-11-01 RX ADMIN — CLONIDINE HYDROCHLORIDE 0.3 MG: 0.3 TABLET ORAL at 17:45

## 2017-11-01 RX ADMIN — TACROLIMUS 4 MG: 1 CAPSULE ORAL at 09:35

## 2017-11-01 RX ADMIN — SULFAMETHOXAZOLE AND TRIMETHOPRIM 1 TABLET: 400; 80 TABLET ORAL at 18:06

## 2017-11-01 RX ADMIN — DOXAZOSIN 1 MG: 1 TABLET ORAL at 21:21

## 2017-11-01 RX ADMIN — PREDNISONE 7.5 MG: 2.5 TABLET ORAL at 09:34

## 2017-11-01 RX ADMIN — TRAZODONE HYDROCHLORIDE 150 MG: 100 TABLET ORAL at 21:21

## 2017-11-01 RX ADMIN — AMLODIPINE BESYLATE 10 MG: 10 TABLET ORAL at 09:33

## 2017-11-01 RX ADMIN — HEPARIN SODIUM 5000 UNITS: 5000 INJECTION, SOLUTION INTRAVENOUS; SUBCUTANEOUS at 06:36

## 2017-11-01 RX ADMIN — FOLIC ACID 1 MG: 1 TABLET ORAL at 09:33

## 2017-11-01 RX ADMIN — METOPROLOL TARTRATE 50 MG: 25 TABLET ORAL at 09:33

## 2017-11-01 RX ADMIN — SODIUM BICARBONATE 650 MG: 650 TABLET ORAL at 21:20

## 2017-11-01 RX ADMIN — TACROLIMUS 3 MG: 1 CAPSULE ORAL at 21:20

## 2017-11-01 RX ADMIN — ROPINIROLE 0.25 MG: 0.25 TABLET, FILM COATED ORAL at 09:34

## 2017-11-01 RX ADMIN — DULOXETINE HYDROCHLORIDE 60 MG: 60 CAPSULE, DELAYED RELEASE ORAL at 09:34

## 2017-11-01 RX ADMIN — HYDRALAZINE HYDROCHLORIDE 50 MG: 50 TABLET, FILM COATED ORAL at 21:21

## 2017-11-01 RX ADMIN — HYDRALAZINE HYDROCHLORIDE 50 MG: 50 TABLET, FILM COATED ORAL at 09:33

## 2017-11-01 RX ADMIN — INSULIN LISPRO 1 UNITS: 100 INJECTION, SOLUTION INTRAVENOUS; SUBCUTANEOUS at 16:37

## 2017-11-01 RX ADMIN — FUROSEMIDE 20 MG: 20 TABLET ORAL at 09:33

## 2017-11-01 RX ADMIN — VITAMIN D, TAB 1000IU (100/BT) 1000 UNITS: 25 TAB at 09:33

## 2017-11-01 RX ADMIN — ROPINIROLE 0.25 MG: 0.25 TABLET, FILM COATED ORAL at 17:45

## 2017-11-01 RX ADMIN — CLONIDINE HYDROCHLORIDE 0.3 MG: 0.3 TABLET ORAL at 15:00

## 2017-11-01 RX ADMIN — LABETALOL 20 MG/4 ML (5 MG/ML) INTRAVENOUS SYRINGE 10 MG: at 00:23

## 2017-11-01 RX ADMIN — FOLIC ACID 1 MG: 1 TABLET ORAL at 17:45

## 2017-11-01 RX ADMIN — CLONIDINE HYDROCHLORIDE 0.2 MG: 0.2 TABLET ORAL at 09:36

## 2017-11-01 RX ADMIN — HYDRALAZINE HYDROCHLORIDE 50 MG: 50 TABLET, FILM COATED ORAL at 15:00

## 2017-11-01 RX ADMIN — SODIUM BICARBONATE 650 MG: 650 TABLET ORAL at 15:08

## 2017-11-01 RX ADMIN — METOPROLOL TARTRATE 50 MG: 25 TABLET ORAL at 17:45

## 2017-11-01 RX ADMIN — SERTRALINE HYDROCHLORIDE 200 MG: 100 TABLET ORAL at 09:33

## 2017-11-01 RX ADMIN — LEVOTHYROXINE SODIUM 88 MCG: 88 TABLET ORAL at 06:36

## 2017-11-01 RX ADMIN — AMLODIPINE BESYLATE 5 MG: 5 TABLET ORAL at 17:45

## 2017-11-01 RX ADMIN — PANTOPRAZOLE SODIUM 40 MG: 40 TABLET, DELAYED RELEASE ORAL at 06:36

## 2017-11-01 RX ADMIN — ASPIRIN 81 MG 81 MG: 81 TABLET ORAL at 09:33

## 2017-11-01 RX ADMIN — PRAVASTATIN SODIUM 80 MG: 80 TABLET ORAL at 09:33

## 2017-11-01 RX ADMIN — DULOXETINE HYDROCHLORIDE 60 MG: 60 CAPSULE, DELAYED RELEASE ORAL at 17:45

## 2017-11-01 RX ADMIN — LORAZEPAM 2 MG: 1 TABLET ORAL at 21:21

## 2017-11-01 RX ADMIN — ARIPIPRAZOLE 20 MG: 10 TABLET ORAL at 09:34

## 2017-11-01 RX ADMIN — CEFAZOLIN SODIUM 1000 MG: 1 SOLUTION INTRAVENOUS at 12:15

## 2017-11-01 RX ADMIN — SODIUM BICARBONATE 650 MG: 650 TABLET ORAL at 09:34

## 2017-11-01 NOTE — PROGRESS NOTES
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  Recommendations:  1  Urine culture showing 40,000- 49,000 mixed contaminants  Patient's symptoms have abated on cefazolin  2   Discussed with primary service and nephrology (Dr Carolee Shafer)  Will try prophylaxis with single strength Bactrim - 1 every other day  Renal function will have to be closely watched and Dr Carolee Shafer is planning on seeing her as an outpatient for this  Primary care will write the order  3  We will discontinue cefazolin    Antibiotics:  1  Trimethoprim sulfa single strength 1 every other day    Subjective: The patient has no complaints  Her back pain and dysuria are gone  Denies fevers, chills, or sweats  Denies nausea, vomiting, or diarrhea  Objective:  Vitals:  HR:  [66-82] 78  Resp:  [18] 18  BP: (170-185)/(60-81) 178/68  SpO2:  [95 %-98 %] 95 %  Temp (24hrs), Av 2 °F (36 8 °C), Min:97 8 °F (36 6 °C), Max:98 5 °F (36 9 °C)  Current: Temperature: 98 5 °F (36 9 °C)    Physical Exam:     General Appearance:  Alert, chronically ill-appearing female who is nontoxic, no acute distress  Throat: Oropharynx moist without lesions  Lips, mucosa, and tongue normal   Neck: Supple, symmetrical, trachea midline, no adenopathy,  no tenderness/mass/nodules   Lungs:   Clear to auscultation bilaterally, no audible wheezes, rhonchi or rales; respirations unlabored   Heart:  Regular rate and rhythm, S1, S2 normal, no murmur, rub or gallop   Abdomen:   Soft, non-tender, non-distended, positive bowel sounds    No masses, no organomegaly,  surgical scars    No CVA tenderness, palpable transplant kidney   Extremities: Decreased pulses, S/P left transmetatarsal and right hallux amputations   Skin: Surgical scars, as above         Invasive Devices     Peripheral Intravenous Line            Peripheral IV 10/30/17 Left Antecubital 2 days                Labs, Imaging, & Other studies:   All pertinent labs were personally reviewed    Results from last 7 days  Lab Units 11/01/17  0500 10/31/17  0630 10/30/17  0922   WBC Thousand/uL 7 78 7 25 9 74   HEMOGLOBIN g/dL 11 3* 11 4* 11 3*   PLATELETS Thousands/uL 201 197 196       Results from last 7 days  Lab Units 11/01/17  0500 10/31/17  0630 10/30/17  0922   SODIUM mmol/L  --  139 136   POTASSIUM mmol/L  --  3 7 3 6   CHLORIDE mmol/L  --  111* 108   CO2 mmol/L  --  21 20*   ANION GAP mmol/L  --  7 8   BUN mg/dL  --  33* 36*   CREATININE mg/dL 1 69* 1 91* 1 79*   EGFR ml/min/1 73sq m 34 29 32   GLUCOSE RANDOM mg/dL  --  147* 128   CALCIUM mg/dL  --  9 1 9 3   AST U/L  --   --  15   ALT U/L  --   --  25   ALK PHOS U/L  --   --  117*   TOTAL PROTEIN g/dL  --   --  9 7*   ALBUMIN g/dL  --   --  3 1*   BILIRUBIN TOTAL mg/dL  --   --  0 34       Results from last 7 days  Lab Units 10/30/17  0837   URINE CULTURE  40,000-49,000 cfu/ml

## 2017-11-01 NOTE — PROGRESS NOTES
I was called to evaluate for suspect hallucinations  The patient's father had arrived to pick her up to take her home, he came out and spoke with nursing that he suspects that she is hallucinating  Currently the patient reports that she has hurt people outside of her door talking about torrorizing the hospital with firearms  She states normal was in the room when she heard these voices  Her father was at bedside states that she has been hospitalized in a psychiatric facility approximately 20 years ago with similar symptoms of paranoia  He states that since paranoia is not typical of her, but she has been under lot of stress recently with the failure of her pancreatic transplant, this current hospitalization, and the wound on her foot  Upon reviewing her chart there is a question of recurring urinary tract infections which may be exacerbating her symptoms while  Believe she may require more observation to rule out infection, as well as evaluation by Psychiatry for suspected hallucinations and paranoia  I will cancer discharge at this time  Repeat a CBC and urinalysis in the morning, monitor temperatures closely, and place a consultation for psychiatry

## 2017-11-01 NOTE — PROGRESS NOTES
Upon entering room to review AVS and prepare pt for d/c family member stated that pt was hallucinating  I asked pt what she was seeing and she stated that someone was going to enter the hospital and shoot people  SLIM paged  Order placed for continual observation and pt will stay to be evaluated by psych

## 2017-11-01 NOTE — PROGRESS NOTES
UROLOGY PROGRESS NOTE   Patient Identifiers: Mercedes Arias (MRN [id number], 47 y o , 1965  Date of Service: 11/1/2017    Subjective:     24 HR EVENTS:   no significant events        Patient has  feels better      Objective:     VITALS:    Vitals:    11/01/17 0013   BP: (!) 180/76   Pulse: 66   Resp: 18   Temp: 97 8 °F (36 6 °C)   SpO2: 95%       INS & OUTS:  [unfilled]    LABS:  Lab Results   Component Value Date    HGB 11 3 (L) 11/01/2017    HCT 34 8 11/01/2017    WBC 7 78 11/01/2017     11/01/2017   ]    Lab Results   Component Value Date     10/31/2017    K 3 7 10/31/2017     (H) 10/31/2017    CO2 21 10/31/2017    BUN 33 (H) 10/31/2017    CREATININE 1 69 (H) 11/01/2017    CALCIUM 9 1 10/31/2017    GLUCOSE 147 (H) 10/31/2017   ]    INPATIENT MEDS:    Current Facility-Administered Medications:     acetaminophen (TYLENOL) tablet 650 mg, 650 mg, Oral, Q6H PRN, Juanito Liang MD    amLODIPine (NORVASC) tablet 10 mg, 10 mg, Oral, Daily, Juanito Liang MD, 10 mg at 10/31/17 0813    amLODIPine (NORVASC) tablet 5 mg, 5 mg, Oral, QPM, Juanito Liang MD, 5 mg at 10/31/17 1709    ARIPiprazole (ABILIFY) tablet 20 mg, 20 mg, Oral, Daily, Juanito Liang MD, 20 mg at 10/31/17 0821    aspirin chewable tablet 81 mg, 81 mg, Oral, Daily, Juanito Liang MD, 81 mg at 10/31/17 0818    ceFAZolin (ANCEF) IVPB (premix) 1,000 mg, 1,000 mg, Intravenous, Q12H, Braxton Cam MD, Last Rate: 100 mL/hr at 10/31/17 2322, 1,000 mg at 10/31/17 2322    cholecalciferol (VITAMIN D3) tablet 1,000 Units, 1,000 Units, Oral, Daily, Juanito Liang MD, 1,000 Units at 10/31/17 0816    cloNIDine (CATAPRES) tablet 0 2 mg, 0 2 mg, Oral, Daily, Braxton Cam MD    cloNIDine (CATAPRES) tablet 0 3 mg, 0 3 mg, Oral, After Greg Kumar MD, 0 3 mg at 10/31/17 1514    cloNIDine (CATAPRES) tablet 0 3 mg, 0 3 mg, Oral, After Dinner, Braxton Cam MD    doxazosin (CARDURA) tablet 1 mg, 1 mg, Oral, HS, Juanito Liang MD, 1 mg at 10/31/17 2155    DULoxetine (CYMBALTA) delayed release capsule 60 mg, 60 mg, Oral, BID, Milad Sun MD, 60 mg at 78/42/87 7256    folic acid (FOLVITE) tablet 1 mg, 1 mg, Oral, BID, Milad Sun MD, 1 mg at 10/31/17 1710    furosemide (LASIX) tablet 20 mg, 20 mg, Oral, Daily, Milad Sun MD, 20 mg at 10/31/17 0818    heparin (porcine) subcutaneous injection 5,000 Units, 5,000 Units, Subcutaneous, Q8H [address], 5,000 Units at 11/01/17 0636 **AND** Platelet count, , , Once, Milad Sun MD    hydrALAZINE (APRESOLINE) tablet 50 mg, 50 mg, Oral, TID, Milad Sun MD, 50 mg at 10/31/17 2155    insulin glargine (LANTUS) subcutaneous injection 20 Units, 20 Units, Subcutaneous, HS, Milad Sun MD, 20 Units at 10/31/17 2154    insulin lispro (HumaLOG) 100 units/mL subcutaneous injection 1-6 Units, 1-6 Units, Subcutaneous, TID AC **AND** Fingerstick Glucose (POCT), , , TID AC, Milad Sun MD    labetalol (NORMODYNE) injection 10 mg, 10 mg, Intravenous, Q4H PRN, Praful Aden PA-C, 10 mg at 11/01/17 0023    levothyroxine tablet 88 mcg, 88 mcg, Oral, Early Morning, Milad Sun MD, 88 mcg at 11/01/17 0636    LORazepam (ATIVAN) tablet 2 mg, 2 mg, Oral, BID PRN, Milad Sun MD    metoprolol tartrate (LOPRESSOR) tablet 50 mg, 50 mg, Oral, BID, Milad Sun MD, 50 mg at 10/31/17 1708    pantoprazole (PROTONIX) EC tablet 40 mg, 40 mg, Oral, Early Morning, Milad Sun MD, 40 mg at 11/01/17 0636    pravastatin (PRAVACHOL) tablet 80 mg, 80 mg, Oral, Daily, Milad Sun MD, 80 mg at 10/31/17 0818    predniSONE tablet 7 5 mg, 7 5 mg, Oral, Daily, Milad Sun MD, 7 5 mg at 10/31/17 0817    rOPINIRole (REQUIP) tablet 0 25 mg, 0 25 mg, Oral, BID, Milad Sun MD, 0 25 mg at 10/31/17 1708    sertraline (ZOLOFT) tablet 200 mg, 200 mg, Oral, Daily, Milad Sun MD, 200 mg at 10/31/17 2155    sodium bicarbonate tablet 650 mg, 650 mg, Oral, TID, Milad Sun MD, 650 mg at 10/31/17 2155    tacrolimus (PROGRAF) capsule 3 mg, 3 mg, Oral, Daily, Filomena Spain MD    tacrolimus (PROGRAF) capsule 4 mg, 4 mg, Oral, Daily, Filomena Spain MD, 4 mg at 10/31/17 9324    traMADol (ULTRAM) tablet 50 mg, 50 mg, Oral, Q6H PRN, Praful Aden PA-C, 50 mg at 10/31/17 0014    traZODone (DESYREL) tablet 150 mg, 150 mg, Oral, HS, Filomena Spain MD, 150 mg at 10/31/17 2155      Physical Exam:   BP (!) 180/76   Pulse 66   Temp 97 8 °F (36 6 °C) (Oral)   Resp 18   Ht 5' 7 5" (1 715 m)   Wt 98 6 kg (217 lb 5 oz)   SpO2 95%   BMI 33 53 kg/m²   GEN: no acute distress    RESP: breathing comfortably with no accessory muscle use    ABD: soft, appropriately tender to palpation, non-distended   INCISION:    EXT: no significant peripheral edema   DRAINS:   URINE: clear      RADIOLOGY:   Previously reviewed     Assessment:   1  Recurrent urinary tract infection  2  Urine incontinence  3  Kidney/pancreas transplant - pancreatic drainage through the urinary bladder  4  CKD  5  DM I  6  Bladder calculi      Plan:   - seen with Dr Natasha Kenny  - urine culture mixed contaminant  - stable for discharge from a urologic standpoint  -  -  -

## 2017-11-01 NOTE — DISCHARGE INSTRUCTIONS
Check BP twice a day at home and bring log in to review at your appointment next week with Dr Carolee Shafer    Continue management of your diabetes as prior to hospital admission and follow-up with your endocrinologist as scheduled    Have repeat blood work completed as advised by Nephrology  Prednisone dose should be 5 mg daily  Continue antibiotics for infection prevention as prescribed Bactrim every other day  Follow-up with Urology at their request  Schedule an appointment with your primary care provider as recommended to be seen after hospital stay within 5-7 days

## 2017-11-01 NOTE — CASE MANAGEMENT
Continued Stay Review    Date: 10/31/17 AT 1711  ADMIT INPATIENT  (10/30/17 AT   OBSERVATION)    10/31/17  AT  [phone]  Inpatient Admission Once     Transfer Service: General Medicine       Question Answer Comment   Admitting Physician Lexis Rush    Level of Care Med Surg    Estimated length of stay More than 2 Midnights    Certification I certify that inpatient services are medically necessary for this patient for a duration of greater than two midnights  See H&P and MD Progress Notes for additional information about the patient's course of treatment                Vital Signs: BP (!) 185/81   Pulse 82   Temp 98 3 °F (36 8 °C) (Oral)   Resp 18   Ht 5' 7 5" (1 715 m)   Wt 98 6 kg (217 lb 5 oz)   SpO2 98%   BMI 33 53 kg/m²     Temp (24hrs), Av 2 °F (36 8 °C), Min:98 1 °F (36 7 °C), Max:98 3 °F (36 8 °C)   HR:  [] 74  Resp:  [18-20] 18  BP: (154-212)/() 174/70  SpO2:  [91 %-97 %] 96 %  Body mass index is 33 53 kg/m²       Input and Output Summary (last 24 hours):   Last data filed at 10/31/17 1140    Gross per 24 hour   Intake              460 ml   Output                0 ml   Net              460 ml     Diet Binu/CHO Controlled; Consistent Carbohydrate Diet Level 2 (5 carb servings/75 grams CHO/meal)    IV ACCESS    Medications:   Scheduled Meds:   amLODIPine 10 mg Oral Daily   amLODIPine 5 mg Oral QPM   ARIPiprazole 20 mg Oral Daily   aspirin 81 mg Oral Daily   cefazolin 1,000 mg Intravenous Q12H   cholecalciferol 1,000 Units Oral Daily   cloNIDine 0 2 mg Oral Daily   cloNIDine 0 3 mg Oral After Lunch   cloNIDine 0 3 mg Oral After Dinner   doxazosin 1 mg Oral HS   DULoxetine 60 mg Oral BID   folic acid 1 mg Oral BID   furosemide 20 mg Oral Daily   heparin (porcine) 5,000 Units Subcutaneous Q8H ARUNA   hydrALAZINE 50 mg Oral TID   insulin glargine 20 Units Subcutaneous HS   insulin lispro 1-6 Units Subcutaneous TID AC   levothyroxine 88 mcg Oral Early Morning   metoprolol tartrate 50 mg Oral BID   pantoprazole 40 mg Oral Early Morning   pravastatin 80 mg Oral Daily   predniSONE 7 5 mg Oral Daily   rOPINIRole 0 25 mg Oral BID   sertraline 200 mg Oral Daily   sodium bicarbonate 650 mg Oral TID   tacrolimus 3 mg Oral HS   tacrolimus 4 mg Oral Daily   traZODone 150 mg Oral HS       PRN Meds:     acetaminophen    IV labetalol 10mg  q4hrs prn SBP > 180 given x 1/ last 24 hrs    LORazepam    traMADol    LABS/Diagnostic Results:  CBC  Results from last 7 days  Lab Units 10/31/17  0630 10/30/17  0922   WBC Thousand/uL 7 25 9 74   HEMOGLOBIN g/dL 11 4* 11 3*   HEMATOCRIT % 34 9 34 4*   PLATELETS Thousands/uL 197 196   NEUTROS PCT %  --  76*   LYMPHS PCT %  --  16   MONOS PCT %  --  6   EOS PCT %  --  1      CMP Results from last 7 days  Lab Units 10/31/17  0630 10/30/17  0922   SODIUM mmol/L 139 136   POTASSIUM mmol/L 3 7 3 6   CHLORIDE mmol/L 111* 108   CO2 mmol/L 21 20*   BUN mg/dL 33* 36*   CREATININE mg/dL 1 91* 1 79*   CALCIUM mg/dL 9 1 9 3   TOTAL PROTEIN g/dL  --  9 7*   BILIRUBIN TOTAL mg/dL  --  0 34   ALK PHOS U/L  --  117*   ALT U/L  --  25   AST U/L  --  15   GLUCOSE RANDOM mg/dL 147* 128     Microbiology  Results from last 7 days  Lab Units 10/30/17  0837   URINE CULTURE   40,000-49,000 cfu/ml      Age/Sex: 47 y o  female       Assessment/ Plan:   Assessment:  1  Urinary Incontinence - Possible Recurrent UTI - Bladder Calculi   Plan:      · Appreciate urology input - recommend outpatient followup for lithotripsy  · Previous urine cultures this year noted for E   Coli susceptible to cephaloporins - on IV Ancef currently - will appreciate ID input due to recurrence in a transplant patient - will recheck urine culture as yesterday's sample was contaminated   · Patient also notes occasional "yeast infections" with prior episodes of UTI for which she has taken Monistat in the past      2   Status post Renal & Pancreatic Transplants   Plan:      § Continue immunosuppressive therapy w/ Prograf/Prednisone   § Outpatient followup      3  Acute Kidney Injury - Chronic Kidney Disease stage 3   Plan:      § Baseline creatinine of approximately 1 6-1 8 - currently @ 1 91   § Possible rise due to UTI coupled w/ uncontrolled BP in the setting of chronic diabetes   § Avoid nephrotoxins IF possible       4  Accelerated Hypertension   Plan:      § Adjusted Clonidine regimen after discussion with patient - c/w Cardura/Lopressor/Norvasc otherwise   § Patient reassured   § Pain control      5  Hypothyroidism   Plan:      § C/w Synthroid - free T4 normal      6  Insulin Dependent Diabetes Mellitus   Plan:      § HgA1c well-controlled @ 5 3   § C/w basal insulin w/ PRN SSI coverage per accuchecks      4    Anemia of Chronic Disease Plan:      Stable Hgb - continue to monitor       VTE Prophylaxis:  Heparin SC        Code Status: Level 1 - Full Code         Discharge Plan:   [address] [facility] CLEARED    CASE MANAGEMENT FOLLOWING CLOSELY FOR ALL DISCHARGE NEEDS

## 2017-11-01 NOTE — CONSULTS
Consultation - Nephrology   Chanelle Galdamez 47 y o  female MRN: [id number]  Unit/Bed#: -01 Encounter: [id number]    ASSESSMENT and PLAN:  1   CKD 3:  Baseline creatinine around 1 6-1 9   -follows with Dr Keila Osman in our office   -creatinine is stable and in her baseline at 1 7 today  2  Status post renal transplant 20 years ago [facility]   -continue Prograf 4 mg in the morning and 3 mg in the evening and prednisone 7 5 mg daily  3  Recurrent UTI with bladder calculi and bladder diverticulum:  Currently seen by infectious disease and urology   -discussed case with patient's nephrologist Dr Keila Osman who agrees that she likely would benefit from antibiotic prophylaxis but recommend avoiding macrolides   -no current urologic intervention but would follow up as an outpatient to consider laser lithotripsy of bladder calculi  4  Hypertension:  Blood pressure elevated this admission   -for now continue home medications:  Amlodipine, clonidine, Cardura, Lasix, hydralazine, metoprolol   -if blood pressure remains elevated would consider increasing Cardura or Hydralazine  5  Volume status:  Appears euvolemic and would continue Lasix 20 mg daily  6  Type 1 diabetes:  Status post pancreas transplant  7  Anemia:  Mild and stable at 11 3    HISTORY OF PRESENT ILLNESS:  Requesting Physician: Peter Salinas MD  Reason for Consult:  Renal transplant with frequent UTI    Chanelle Galdamez is a 47y o  year old female who was admitted to [facility] after presenting with concern for a UTI  Patient states that over the past several weeks she has had multiple antibiotic treatments for UTI  She recently finished with her course of antibiotics a few days after developed urinary incontinence and back pain so she came to the ER with concern for UTI  She was started on Ancef and Infectious Disease was consulted    Today renal was also consulted to help determine if patient would be appropriate for long-term antibiotic suppression  Patient currently is feeling much better  She denies any more urinary symptoms or back pain  She has no chest pain, shortness of breath, nausea, vomiting, diarrhea  She tells me that her urine culture was actually negative this admission and wonders if the symptoms are just in her head  PAST MEDICAL HISTORY:  Past Medical History:   Diagnosis Date    Anemia     Cancer ([place] [address] )     Multiple myeloma    Chronic diastolic (congestive) heart failure 9/18/2017    Diabetes mellitus ([place] [address] )     Previous, controlled with diet    Disease of thyroid gland     History of transfusion     Hypertension     Night blindness     Psychiatric disorder     Renal disorder     Retinopathy     Status post simultaneous kidney and pancreas transplant ([place] [address] )     Toe amputation status ([place] [address] )        PAST SURGICAL HISTORY:  Past Surgical History:   Procedure Laterality Date    COMBINED KIDNEY-PANCREAS TRANSPLANT N/A     CYSTOSCOPY N/A 10/13/2016    Procedure: CYSTOSCOPY, retrograde pyelogram, biopsy of ureteral polyp;   Surgeon: Dolores Long MD;  Location: [facility];  Service:    [facility] EYE SURGERY      cataracts    FOOT AMPUTATION THROUGH METATARSAL Left     HALLUX VALGUS CORRECTION Right     NEPHRECTOMY TRANSPLANTED ORGAN         ALLERGIES:  Allergies   Allergen Reactions    Morphine And Related GI Intolerance    Penicillins Hives     Tolerates cefazolin    Myrbetriq [Mirabegron] Hives       SOCIAL HISTORY:  History   Alcohol Use No     History   Drug Use No     History   Smoking Status    Former Smoker    Quit date: 4/28/2012   Smokeless Tobacco    Former User       FAMILY HISTORY:  Family History   Problem Relation Age of Onset    Hypertension Mother     Hypertension Father     Cancer Maternal Grandfather     Cancer Paternal Grandmother     Cancer Paternal Grandfather        MEDICATIONS:  Scheduled Meds:  amLODIPine 10 mg Oral Daily   amLODIPine 5 mg Oral QPM   ARIPiprazole 20 mg Oral Daily   aspirin 81 mg Oral Daily   cefazolin 1,000 mg Intravenous Q12H   cholecalciferol 1,000 Units Oral Daily   cloNIDine 0 2 mg Oral Daily   cloNIDine 0 3 mg Oral After Lunch   cloNIDine 0 3 mg Oral After Dinner   doxazosin 1 mg Oral HS   DULoxetine 60 mg Oral BID   folic acid 1 mg Oral BID   furosemide 20 mg Oral Daily   heparin (porcine) 5,000 Units Subcutaneous Q8H [address]   hydrALAZINE 50 mg Oral TID   insulin glargine 20 Units Subcutaneous HS   insulin lispro 1-6 Units Subcutaneous TID AC   levothyroxine 88 mcg Oral Early Morning   metoprolol tartrate 50 mg Oral BID   pantoprazole 40 mg Oral Early Morning   pravastatin 80 mg Oral Daily   predniSONE 7 5 mg Oral Daily   rOPINIRole 0 25 mg Oral BID   sertraline 200 mg Oral Daily   sodium bicarbonate 650 mg Oral TID   tacrolimus 3 mg Oral HS   tacrolimus 4 mg Oral Daily   traZODone 150 mg Oral HS       PRN Meds:   acetaminophen    labetalol    LORazepam    traMADol    Continuous Infusions:     REVIEW OF SYSTEMS:  A complete review of systems was done  Pertinent positives and negatives noted in the HPI but otherwise the review of systems is negative      PHYSICAL EXAM:  Current Weight: Weight - Scale: 98 6 kg (217 lb 5 oz)  First Weight: Weight - Scale: 99 8 kg (220 lb)  Vitals:    11/01/17 0904   BP: (!) 185/81   Pulse: 82   Resp: 18   Temp: 98 3 °F (36 8 °C)   SpO2: 98%       Intake/Output Summary (Last 24 hours) at 11/01/17 1309  Last data filed at 10/31/17 2322   Gross per 24 hour   Intake          1921 67 ml   Output                0 ml   Net          1921 67 ml     General:  No acute distress  Skin:  No rash  Eyes:  Sclerae anicteric  ENT:  Moist mucous membranes  Neck:  Supple, symmetric  Chest:  Clear to auscultation bilaterally  CVS:  Regular rate and rhythm  Abdomen:  Soft, nontender, nondistended  Extremities:  No edema, status post TMA  Neuro:  Awake and alert  Psych:  Appropriate affect    Lab Results:   Lab Results   Component Value Date    WBC 7 78 11/01/2017 HGB 11 3 (L) 11/01/2017    HCT 34 8 11/01/2017    MCV 90 11/01/2017     11/01/2017     Lab Results   Component Value Date    GLUCOSE 147 (H) 10/31/2017    CALCIUM 9 1 10/31/2017     10/31/2017    K 3 7 10/31/2017    CO2 21 10/31/2017     (H) 10/31/2017    BUN 33 (H) 10/31/2017    CREATININE 1 69 (H) 11/01/2017     Lab Results   Component Value Date    CALCIUM 9 1 10/31/2017    PHOS 4 3 09/15/2017

## 2017-11-01 NOTE — PLAN OF CARE
Problem: Potential for Falls  Goal: Patient will remain free of falls  INTERVENTIONS:  - Assess patient frequently for physical needs  -  Identify cognitive and physical deficits and behaviors that affect risk of falls  -  [place] fall precautions as indicated by assessment   - Educate patient/family on patient safety including physical limitations  - Instruct patient to call for assistance with activity based on assessment  - Modify environment to reduce risk of injury  - Consider OT/PT consult to assist with strengthening/mobility    Outcome: Progressing      Problem: SAFETY ADULT  Goal: Patient will remain free of falls  INTERVENTIONS:  - Assess patient frequently for physical needs  -  Identify cognitive and physical deficits and behaviors that affect risk of falls    -  [place] fall precautions as indicated by assessment   - Educate patient/family on patient safety including physical limitations  - Instruct patient to call for assistance with activity based on assessment  - Modify environment to reduce risk of injury  - Consider OT/PT consult to assist with strengthening/mobility    Outcome: Progressing

## 2017-11-02 VITALS
OXYGEN SATURATION: 94 % | WEIGHT: 217.31 LBS | TEMPERATURE: 98.4 F | HEART RATE: 78 BPM | DIASTOLIC BLOOD PRESSURE: 79 MMHG | RESPIRATION RATE: 18 BRPM | BODY MASS INDEX: 32.93 KG/M2 | SYSTOLIC BLOOD PRESSURE: 175 MMHG | HEIGHT: 68 IN

## 2017-11-02 LAB
ANION GAP SERPL CALCULATED.3IONS-SCNC: 7 MMOL/L (ref 4–13)
BASOPHILS # BLD AUTO: 0.05 THOUSANDS/ΜL (ref 0–0.1)
BASOPHILS NFR BLD AUTO: 1 % (ref 0–1)
BUN SERPL-MCNC: 30 MG/DL (ref 5–25)
CALCIUM SERPL-MCNC: 8.8 MG/DL (ref 8.3–10.1)
CHLORIDE SERPL-SCNC: 107 MMOL/L (ref 100–108)
CO2 SERPL-SCNC: 23 MMOL/L (ref 21–32)
CREAT SERPL-MCNC: 1.69 MG/DL (ref 0.6–1.3)
EOSINOPHIL # BLD AUTO: 0.23 THOUSAND/ΜL (ref 0–0.61)
EOSINOPHIL NFR BLD AUTO: 4 % (ref 0–6)
ERYTHROCYTE [DISTWIDTH] IN BLOOD BY AUTOMATED COUNT: 17 % (ref 11.6–15.1)
GFR SERPL CREATININE-BSD FRML MDRD: 34 ML/MIN/1.73SQ M
GLUCOSE SERPL-MCNC: 117 MG/DL (ref 65–140)
GLUCOSE SERPL-MCNC: 127 MG/DL (ref 65–140)
GLUCOSE SERPL-MCNC: 132 MG/DL (ref 65–140)
GLUCOSE SERPL-MCNC: 137 MG/DL (ref 65–140)
HCT VFR BLD AUTO: 35.7 % (ref 34.8–46.1)
HGB BLD-MCNC: 11.4 G/DL (ref 11.5–15.4)
LYMPHOCYTES # BLD AUTO: 1.67 THOUSANDS/ΜL (ref 0.6–4.47)
LYMPHOCYTES NFR BLD AUTO: 25 % (ref 14–44)
MCH RBC QN AUTO: 29.1 PG (ref 26.8–34.3)
MCHC RBC AUTO-ENTMCNC: 31.9 G/DL (ref 31.4–37.4)
MCV RBC AUTO: 91 FL (ref 82–98)
MONOCYTES # BLD AUTO: 0.59 THOUSAND/ΜL (ref 0.17–1.22)
MONOCYTES NFR BLD AUTO: 9 % (ref 4–12)
NEUTROPHILS # BLD AUTO: 4.06 THOUSANDS/ΜL (ref 1.85–7.62)
NEUTS SEG NFR BLD AUTO: 61 % (ref 43–75)
NRBC BLD AUTO-RTO: 0 /100 WBCS
PLATELET # BLD AUTO: 188 THOUSANDS/UL (ref 149–390)
PMV BLD AUTO: 11 FL (ref 8.9–12.7)
POTASSIUM SERPL-SCNC: 3.3 MMOL/L (ref 3.5–5.3)
RBC # BLD AUTO: 3.92 MILLION/UL (ref 3.81–5.12)
SODIUM SERPL-SCNC: 137 MMOL/L (ref 136–145)
WBC # BLD AUTO: 6.63 THOUSAND/UL (ref 4.31–10.16)

## 2017-11-02 PROCEDURE — 82948 REAGENT STRIP/BLOOD GLUCOSE: CPT

## 2017-11-02 PROCEDURE — 80048 BASIC METABOLIC PNL TOTAL CA: CPT | Performed by: NURSE PRACTITIONER

## 2017-11-02 PROCEDURE — 85025 COMPLETE CBC W/AUTO DIFF WBC: CPT | Performed by: INTERNAL MEDICINE

## 2017-11-02 RX ORDER — DOXAZOSIN 2 MG/1
2 TABLET ORAL
Qty: 30 TABLET | Refills: 0 | Status: SHIPPED | OUTPATIENT
Start: 2017-11-02 | End: 2018-01-24

## 2017-11-02 RX ORDER — DOXAZOSIN 2 MG/1
2 TABLET ORAL
Status: DISCONTINUED | OUTPATIENT
Start: 2017-11-02 | End: 2017-11-02 | Stop reason: HOSPADM

## 2017-11-02 RX ADMIN — SODIUM BICARBONATE 650 MG: 650 TABLET ORAL at 09:06

## 2017-11-02 RX ADMIN — FOLIC ACID 1 MG: 1 TABLET ORAL at 09:04

## 2017-11-02 RX ADMIN — PREDNISONE 5 MG: 5 TABLET ORAL at 09:04

## 2017-11-02 RX ADMIN — LORAZEPAM 2 MG: 1 TABLET ORAL at 11:26

## 2017-11-02 RX ADMIN — SERTRALINE HYDROCHLORIDE 200 MG: 100 TABLET ORAL at 09:03

## 2017-11-02 RX ADMIN — VITAMIN D, TAB 1000IU (100/BT) 1000 UNITS: 25 TAB at 09:04

## 2017-11-02 RX ADMIN — ARIPIPRAZOLE 20 MG: 10 TABLET ORAL at 09:05

## 2017-11-02 RX ADMIN — PRAVASTATIN SODIUM 80 MG: 80 TABLET ORAL at 09:04

## 2017-11-02 RX ADMIN — FUROSEMIDE 20 MG: 20 TABLET ORAL at 09:04

## 2017-11-02 RX ADMIN — AMLODIPINE BESYLATE 10 MG: 10 TABLET ORAL at 09:03

## 2017-11-02 RX ADMIN — SODIUM BICARBONATE 650 MG: 650 TABLET ORAL at 16:52

## 2017-11-02 RX ADMIN — CLONIDINE HYDROCHLORIDE 0.2 MG: 0.2 TABLET ORAL at 09:06

## 2017-11-02 RX ADMIN — PANTOPRAZOLE SODIUM 40 MG: 40 TABLET, DELAYED RELEASE ORAL at 06:32

## 2017-11-02 RX ADMIN — METOPROLOL TARTRATE 50 MG: 25 TABLET ORAL at 09:04

## 2017-11-02 RX ADMIN — CLONIDINE HYDROCHLORIDE 0.3 MG: 0.3 TABLET ORAL at 14:52

## 2017-11-02 RX ADMIN — LEVOTHYROXINE SODIUM 88 MCG: 88 TABLET ORAL at 06:32

## 2017-11-02 RX ADMIN — ROPINIROLE 0.25 MG: 0.25 TABLET, FILM COATED ORAL at 09:06

## 2017-11-02 RX ADMIN — HEPARIN SODIUM 5000 UNITS: 5000 INJECTION, SOLUTION INTRAVENOUS; SUBCUTANEOUS at 13:34

## 2017-11-02 RX ADMIN — ASPIRIN 81 MG 81 MG: 81 TABLET ORAL at 09:04

## 2017-11-02 RX ADMIN — HEPARIN SODIUM 5000 UNITS: 5000 INJECTION, SOLUTION INTRAVENOUS; SUBCUTANEOUS at 06:32

## 2017-11-02 RX ADMIN — DULOXETINE HYDROCHLORIDE 60 MG: 60 CAPSULE, DELAYED RELEASE ORAL at 09:05

## 2017-11-02 RX ADMIN — TACROLIMUS 4 MG: 1 CAPSULE ORAL at 09:06

## 2017-11-02 RX ADMIN — HYDRALAZINE HYDROCHLORIDE 50 MG: 50 TABLET, FILM COATED ORAL at 09:04

## 2017-11-02 RX ADMIN — HYDRALAZINE HYDROCHLORIDE 50 MG: 50 TABLET, FILM COATED ORAL at 16:53

## 2017-11-02 NOTE — PROGRESS NOTES
Pt continues to have paranoid delusions  Had to explain to her that her evening meds were sealed and that I was not attempting to poison her  Continues to believe that someone is coming to the hospital to cause harm to herself or staff  Refused heparin shot  Refused lantus d/t sugar being low in the morning  IV access was lost d/t anticipating d/c earlier this evening  Not attempting new IV at this time d/t pt thinking staff is attempting to harm her

## 2017-11-02 NOTE — PROGRESS NOTES
UROLOGY PROGRESS NOTE   Patient Identifiers:  Redd Mascorro (MRN [id number], 47 y o , 1965   Date of Service: 11/2/2017    Subjective:     24 HR EVENTS:   had delerium hallucinations  last night prompting a psych eval      Patient has  feels better this morning but anxious      Objective:     VITALS:    Vitals:    11/02/17 0020   BP: (!) 172/74   Pulse: 72   Resp: 18   Temp: 98 6 °F (37 °C)   SpO2: 92%       INS & OUTS:  [unfilled]    LABS:  Lab Results   Component Value Date    HGB 11 4 (L) 11/02/2017    HCT 35 7 11/02/2017    WBC 6 63 11/02/2017     11/02/2017   ]    Lab Results   Component Value Date     11/02/2017    K 3 3 (L) 11/02/2017     11/02/2017    CO2 23 11/02/2017    BUN 30 (H) 11/02/2017    CREATININE 1 69 (H) 11/02/2017    CALCIUM 8 8 11/02/2017    GLUCOSE 117 11/02/2017   ]    INPATIENT MEDS:    Current Facility-Administered Medications:     acetaminophen (TYLENOL) tablet 650 mg, 650 mg, Oral, Q6H PRN, Shannon Cohen MD    amLODIPine (NORVASC) tablet 10 mg, 10 mg, Oral, Daily, Shannon Cohen MD, 10 mg at 11/01/17 0933    amLODIPine (NORVASC) tablet 5 mg, 5 mg, Oral, QPM, Shannon Cohen MD, 5 mg at 11/01/17 1745    ARIPiprazole (ABILIFY) tablet 20 mg, 20 mg, Oral, Daily, Shannon Cohen MD, 20 mg at 11/01/17 0934    aspirin chewable tablet 81 mg, 81 mg, Oral, Daily, Shannon Cohen MD, 81 mg at 11/01/17 0933    cholecalciferol (VITAMIN D3) tablet 1,000 Units, 1,000 Units, Oral, Daily, Shannon Cohen MD, 1,000 Units at 11/01/17 0933    cloNIDine (CATAPRES) tablet 0 2 mg, 0 2 mg, Oral, Daily, Hilario Lefort, MD, 0 2 mg at 11/01/17 0616    cloNIDine (CATAPRES) tablet 0 3 mg, 0 3 mg, Oral, After Dimitry Penn MD, 0 3 mg at 11/01/17 1500    cloNIDine (CATAPRES) tablet 0 3 mg, 0 3 mg, Oral, After Santiago Queen MD, 0 3 mg at 11/01/17 1745    doxazosin (CARDURA) tablet 1 mg, 1 mg, Oral, HS, Shannon Cohen MD, 1 mg at 11/01/17 2121    DULoxetine (CYMBALTA) delayed release capsule 60 mg, 60 mg, Oral, BID, Rc Ferrari MD, 60 mg at 80/32/74 2808    folic acid (FOLVITE) tablet 1 mg, 1 mg, Oral, BID, Rc Ferrari MD, 1 mg at 11/01/17 1745    furosemide (LASIX) tablet 20 mg, 20 mg, Oral, Daily, Rc Ferrari MD, 20 mg at 11/01/17 0933    heparin (porcine) subcutaneous injection 5,000 Units, 5,000 Units, Subcutaneous, Q8H [address], 5,000 Units at 11/02/17 4510 **AND** Platelet count, , , Once, Rc Ferrari MD    hydrALAZINE (APRESOLINE) tablet 50 mg, 50 mg, Oral, TID, Rc Ferrari MD, 50 mg at 11/01/17 2121    insulin glargine (LANTUS) subcutaneous injection 20 Units, 20 Units, Subcutaneous, HS, Rc Ferrari MD, 20 Units at 10/31/17 2154    insulin lispro (HumaLOG) 100 units/mL subcutaneous injection 1-6 Units, 1-6 Units, Subcutaneous, TID AC, 1 Units at 11/01/17 1637 **AND** Fingerstick Glucose (POCT), , , TID AC, Rc Ferrari MD    labetalol (NORMODYNE) injection 10 mg, 10 mg, Intravenous, Q4H PRN, Praful Aden PA-C, 10 mg at 11/01/17 0023    levothyroxine tablet 88 mcg, 88 mcg, Oral, Early Morning, Rc Ferrari MD, 88 mcg at 11/02/17 9418    LORazepam (ATIVAN) tablet 2 mg, 2 mg, Oral, BID PRN, Rc Ferrari MD, 2 mg at 11/01/17 2121    metoprolol tartrate (LOPRESSOR) tablet 50 mg, 50 mg, Oral, BID, Rc Ferrari MD, 50 mg at 11/01/17 1745    pantoprazole (PROTONIX) EC tablet 40 mg, 40 mg, Oral, Early Morning, Rc Ferrari MD, 40 mg at 11/02/17 2566    pravastatin (PRAVACHOL) tablet 80 mg, 80 mg, Oral, Daily, Rc Ferrari MD, 80 mg at 11/01/17 0933    predniSONE tablet 5 mg, 5 mg, Oral, Daily, Rylee Coughlin MD    rOPINIRole (REQUIP) tablet 0 25 mg, 0 25 mg, Oral, BID, Rc Ferrari MD, 0 25 mg at 11/01/17 1745    sertraline (ZOLOFT) tablet 200 mg, 200 mg, Oral, Daily, Rc Ferrari MD, 200 mg at 11/01/17 0933    sodium bicarbonate tablet 650 mg, 650 mg, Oral, TID, Rc Ferrari MD, 650 mg at 11/01/17 2120    tacrolimus (PROGRAF) capsule 3 mg, 3 mg, Oral, HS, MER Virk, 3 mg at 11/01/17 2120    tacrolimus (PROGRAF) capsule 4 mg, 4 mg, Oral, Daily, Vanna Closs, MD, 4 mg at 11/01/17 0935    traMADol (ULTRAM) tablet 50 mg, 50 mg, Oral, Q6H PRN, Praful Aden PA-C, 50 mg at 10/31/17 0014    traZODone (DESYREL) tablet 150 mg, 150 mg, Oral, HS, Vanna Closs, MD, 150 mg at 11/01/17 2121      Physical Exam:   BP (!) 172/74   Pulse 72   Temp 98 6 °F (37 °C) (Oral)   Resp 18   Ht 5' 7 5" (1 715 m)   Wt 98 6 kg (217 lb 5 oz)   SpO2 92%   BMI 33 53 kg/m²   GEN: no acute distress    RESP: breathing comfortably with no accessory muscle use    ABD: soft, non-tender, non-distended   INCISION:    EXT: no significant peripheral edema   DRAINS:   URINE: clear    RADIOLOGY:   Previous studies reviewed     Assessment:   1  Recurrent urinary tract infection  2  Urine incontinence  3  Kidney/pancreas transplant - pancreatic drainage through the urinary bladder  4  CKD  5  DM I  6   Bladder calculi      Plan:   - stable from urologic standpoint  - outpatient urology follow up  - will sign off at this time  - please call with questions or problems  -  -  -

## 2017-11-02 NOTE — CONSULTS
Consultation - [facility] 47 y o  female MRN: [id number]  Unit/Bed#: -01 Encounter: [id number]      Chief Complaint: I thought that the nurses would walk out of their job and I was scared for my health    History of Present Illness   Physician Requesting Consult: Gianluca Lehman MD  Reason for Consult / Principal Problem: ozzie Quiles is a 47 y o  female presents with urinary incontinence  Patient has multiple medical problems including urinary incontinence, chronic kidney disease stage 4 s/p kidney and pancreas transplant, hypothyroidism, Type 1 DM, immunosuppression, chronic diastolic heart failure, calculus bladder diverticulum and recurrent UTIs  Patient also suffers from depression but has been stable for several years  She states that yesterday when they were preparing her discharge papers she heard the nurses talking outside that they were planning to walk out due to a labor dispute and they were planning to shoot people  She became very scared and started thinking about her health  She became paranoid against the nurses and was very anxious  She states that she had poor sleep the first night she was in the hospital  She received an extra Ativan and was able to sleep 6 hours  Today she realized that everything was in her mind and it was hallucinations and paranoid thinking  It was not real  She feels better today  She feels safe with the nurses, denies any hallucinations or paranoid thinking  She states that every time she has a UTI she starts acting out and has similar symptoms  She denies any suicidal thoughts and feels that she is safe to go home          Psychiatric Review Of Systems:  sleep: no  appetite changes: no  weight changes: no  energy/anergy: no  interest/pleasure/anhedonia: no  somatic symptoms: no  anxiety/panic: yes  onesimo: no  guilty/hopeless: no  self injurious behavior/risky behavior: no    Historical Information   Past Psychiatric History: One inpatient admission 20 years ago  Currently in treatment with Dr Brice Darling   Past Suicide attempts: none  Past Violent behavior: none  Past Psychiatric medication trial: Abilify, Cymbalta, Lorazepam, Zoloft, Trazodone    Substance Abuse History:  She denies any drugs or alcohol history     I have assessed this patient for substance use within the past 12 months    History of IP/OP rehabilitation program:none   Smoking history: former smoker  Family Psychiatric History:   She denies any     Social History  Education: some college  Learning Disabilities: none  Marital history:   Living arrangement, social support: lives alone but her parents are very supportive  Occupational History: on permanent disability  Functioning Relationships: good support system    Other Pertinent History: None    Traumatic History:   Abuse: denies any  Other Traumatic Events: pancreas and kidney transplants    Past Medical History:   Diagnosis Date    Anemia     Cancer ([facility] )     Multiple myeloma    Chronic diastolic (congestive) heart failure 9/18/2017    Diabetes mellitus ([facility])     Previous, controlled with diet    Disease of thyroid gland     History of transfusion     Hypertension     Night blindness     Psychiatric disorder     Renal disorder     Retinopathy     Status post simultaneous kidney and pancreas transplant ([facility] )     Toe amputation status ([facility] )        Medical Review Of Systems:  Review of Systems - Negative except hand tremors, anxiety, all other systems reviewed and are negative    Meds/Allergies   current meds:   Current Facility-Administered Medications   Medication Dose Route Frequency    acetaminophen (TYLENOL) tablet 650 mg  650 mg Oral Q6H PRN    amLODIPine (NORVASC) tablet 10 mg  10 mg Oral Daily    amLODIPine (NORVASC) tablet 5 mg  5 mg Oral QPM    ARIPiprazole (ABILIFY) tablet 20 mg  20 mg Oral Daily    aspirin chewable tablet 81 mg  81 mg Oral Daily    cholecalciferol (VITAMIN D3) tablet 1,000 Units  1,000 Units Oral Daily    cloNIDine (CATAPRES) tablet 0 2 mg  0 2 mg Oral Daily    cloNIDine (CATAPRES) tablet 0 3 mg  0 3 mg Oral After Lunch    cloNIDine (CATAPRES) tablet 0 3 mg  0 3 mg Oral After Dinner    doxazosin (CARDURA) tablet 1 mg  1 mg Oral HS    DULoxetine (CYMBALTA) delayed release capsule 60 mg  60 mg Oral BID    folic acid (FOLVITE) tablet 1 mg  1 mg Oral BID    furosemide (LASIX) tablet 20 mg  20 mg Oral Daily    heparin (porcine) subcutaneous injection 5,000 Units  5,000 Units Subcutaneous Q8H [address]    hydrALAZINE (APRESOLINE) tablet 50 mg  50 mg Oral TID    insulin glargine (LANTUS) subcutaneous injection 20 Units  20 Units Subcutaneous HS    insulin lispro (HumaLOG) 100 units/mL subcutaneous injection 1-6 Units  1-6 Units Subcutaneous TID AC    labetalol (NORMODYNE) injection 10 mg  10 mg Intravenous Q4H PRN    levothyroxine tablet 88 mcg  88 mcg Oral Early Morning    LORazepam (ATIVAN) tablet 2 mg  2 mg Oral BID PRN    metoprolol tartrate (LOPRESSOR) tablet 50 mg  50 mg Oral BID    pantoprazole (PROTONIX) EC tablet 40 mg  40 mg Oral Early Morning    pravastatin (PRAVACHOL) tablet 80 mg  80 mg Oral Daily    predniSONE tablet 5 mg  5 mg Oral Daily    rOPINIRole (REQUIP) tablet 0 25 mg  0 25 mg Oral BID    sertraline (ZOLOFT) tablet 200 mg  200 mg Oral Daily    sodium bicarbonate tablet 650 mg  650 mg Oral TID    tacrolimus (PROGRAF) capsule 3 mg  3 mg Oral HS    tacrolimus (PROGRAF) capsule 4 mg  4 mg Oral Daily    traMADol (ULTRAM) tablet 50 mg  50 mg Oral Q6H PRN    traZODone (DESYREL) tablet 150 mg  150 mg Oral HS     Allergies   Allergen Reactions    Morphine And Related GI Intolerance    Penicillins Hives     Tolerates cefazolin    Myrbetriq [Mirabegron] Hives       Objective   Vital signs in last 24 hours:  Temp:  [98 4 °F (36 9 °C)-98 6 °F (37 °C)] 98 4 °F (36 9 °C)  HR:  [72-78] 78  Resp:  [18] 18  BP: (170-214)/(60-88) 214/88      Intake/Output Summary (Last 24 hours) at 11/02/17 1234  Last data filed at 11/01/17 2206   Gross per 24 hour   Intake              660 ml   Output                0 ml   Net              660 ml       Mental Status Evaluation:  Appearance:  age appropriate   Behavior:  cooperative   Speech:  normal pitch and normal volume   Mood:  anxious   Affect:  mood-congruent   Language: naming objects and repeating phrases   Thought Process:  goal directed   Associations: intact associations   Thought Content:  normal   Perceptual Disturbances: None   Risk Potential: no suicidal ideation, plan, or intent   Sensorium:  person, place, time/date, situation and day of week   Memory:  recent and remote memory grossly intact   Cognition:  grossly intact   Consciousness:  alert and awake    Attention: attention span and concentration were age appropriate   Intellect: within normal limits   Fund of Knowledge: awareness of current events: fair   Insight:  fair   Judgment: fair   Muscle Strength and Tone: within normal limits   Gait/Station: normal gait/station   Motor Activity: hand tremors     Lab Results:    Lab Results   Component Value Date    GLUCOSE 117 11/02/2017    CALCIUM 8 8 11/02/2017     11/02/2017    K 3 3 (L) 11/02/2017    CO2 23 11/02/2017     11/02/2017    BUN 30 (H) 11/02/2017    CREATININE 1 69 (H) 11/02/2017     Lab Results   Component Value Date    WBC 6 63 11/02/2017    HGB 11 4 (L) 11/02/2017    HCT 35 7 11/02/2017    MCV 91 11/02/2017     11/02/2017         Code Status: )Level 1 - Full Code    Assessment/Plan     Assessment:  Bella Griffin is a 47 y o  female who presented with urinary incontinence  Patient has multiple medical problems  Yesterday prior to discharge patient started having hallucinations and paranoid thinking  She had similar episodes in the past when she has UTIs  Today she is anxious, denies any hallucinations and no delusions are present   She realized that this was not real  At this moment she trusts the nurses and feels like she is safe to go home  Diagnosis: Major depressive disorder, recurrent, in full remission F33 [id number]  Post-traumatic stress disorder, chronic F43 12  Plan:   Continue medical management   Continue current psychotropic medications   Follow-up with Dr Marion Bates in outpatient   Consider individual therapy in outpatient   Discussed with the primary team   Patient can go home when medically clear  I will sign off  Risks, benefits and possible side effects of Medications:   Risks, benefits, and possible side effects of medications explained to patient and patient verbalizes understanding            Omar Pal MD

## 2017-11-02 NOTE — PROGRESS NOTES
[address] Hospitalist Service - Internal Medicine Progress Note  Patient: Harshal Levy 47 y o  female   MRN: [id number]  PCP: Kaushal Cintron MD  Unit/Bed#: -Boris Encounter: [id number]  Date Of Visit: 17         Subjective:   Patient was initially to be discharged yesterday but upon hearing the news of discharge became increasingly anxious and stated that she was hearing voices and seeing things"  For this reason a psychiatry consult was ordered and she was seen this morning by them  This morning she is more calm and acknowledges that she freaked out yesterday thinking she would be discharged too soon and it was more anxiety induced than anything else  She acknowledges that any voices/objects she saw were fictitious  She will be discharged today  She was seen by Nephrology again earlier in the day who have increased her doxazosin dose at night to 2 mg  She will follow up with her PCP and Nephrology as an outpatient as instructed  She is in pleasant spirits and denies any other complaints at this time  Objective:     Vitals:   Temp (24hrs), Av 5 °F (36 9 °C), Min:98 4 °F (36 9 °C), Max:98 6 °F (37 °C)    HR:  [72-78] 78  Resp:  [18] 18  BP: (160-214)/(58-88) 160/58  SpO2:  [92 %-94 %] 94 %  Body mass index is 33 53 kg/m²  Input and Output Summary (last 24 hours):        Intake/Output Summary (Last 24 hours) at 17 1557  Last data filed at 17 1100   Gross per 24 hour   Intake             1010 ml   Output                0 ml   Net             1010 ml       Physical Exam:     GENERAL:  Well-developed/nourished - no acute distress  HEAD:  Normocephalic - atraumatic  EYES: PERRL - EOMI   MOUTH:  Mucosa moist  NECK:  Supple - full range of motion - no adenopathy - trachea midline  CARDIAC:  Regular rate/rhythm - S1/S2 positive  PULMONARY:  Clear to auscultation bilaterally - nonlabored respirations  ABDOMEN:  Soft - nontender/nondistended - active bowel sounds  MUSCULOSKELETAL:  Motor strength/range of motion intact  NEUROLOGIC:  Alert/oriented x 3  SKIN:  Age-appropriate wrinkles/blemishes   PSYCHIATRIC:  Improved anxiety      Additional Data:     Labs & Recent Cultures:       Results from last 7 days  Lab Units 11/02/17  0521   WBC Thousand/uL 6 63   HEMOGLOBIN g/dL 11 4*   HEMATOCRIT % 35 7   PLATELETS Thousands/uL 188   NEUTROS PCT % 61   LYMPHS PCT % 25   MONOS PCT % 9   EOS PCT % 4       Results from last 7 days  Lab Units 11/02/17  0521  10/30/17  0922   SODIUM mmol/L 137  < > 136   POTASSIUM mmol/L 3 3*  < > 3 6   CHLORIDE mmol/L 107  < > 108   CO2 mmol/L 23  < > 20*   BUN mg/dL 30*  < > 36*   CREATININE mg/dL 1 69*  < > 1 79*   CALCIUM mg/dL 8 8  < > 9 3   TOTAL PROTEIN g/dL  --   --  9 7*   BILIRUBIN TOTAL mg/dL  --   --  0 34   ALK PHOS U/L  --   --  117*   ALT U/L  --   --  25   AST U/L  --   --  15   GLUCOSE RANDOM mg/dL 117  < > 128   < > = values in this interval not displayed              Results from last 7 days  Lab Units 10/30/17  0837   URINE CULTURE  40,000-49,000 cfu/ml          Last 24 Hours Medication List:     amLODIPine 10 mg Oral Daily   amLODIPine 5 mg Oral QPM   ARIPiprazole 20 mg Oral Daily   aspirin 81 mg Oral Daily   cholecalciferol 1,000 Units Oral Daily   cloNIDine 0 2 mg Oral Daily   cloNIDine 0 3 mg Oral After Lunch   cloNIDine 0 3 mg Oral After Dinner   doxazosin 2 mg Oral HS   DULoxetine 60 mg Oral BID   folic acid 1 mg Oral BID   furosemide 20 mg Oral Daily   heparin (porcine) 5,000 Units Subcutaneous Q8H [address]   hydrALAZINE 50 mg Oral TID   insulin glargine 20 Units Subcutaneous HS   insulin lispro 1-6 Units Subcutaneous TID AC   levothyroxine 88 mcg Oral Early Morning   metoprolol tartrate 50 mg Oral BID   pantoprazole 40 mg Oral Early Morning   pravastatin 80 mg Oral Daily   predniSONE 5 mg Oral Daily   rOPINIRole 0 25 mg Oral BID   sertraline 200 mg Oral Daily   sodium bicarbonate 650 mg Oral TID   tacrolimus 3 mg Oral HS tacrolimus 4 mg Oral Daily   traZODone 150 mg Oral HS         Assessments:  Please see discharge summary from yesterday for full details    1  Urinary Incontinence - Possible Recurrent UTI - Bladder Calculi   Plan:   · Appreciate urology input - recommend outpatient followup for lithotripsy  Previous urine cultures this year noted for E  Coli susceptible to cephaloporins - stop IV Ancef and both Infectious Disease and Nephrology have agreed that the patient would benefit from chronic antibiotic suppression of recurring UTI (on Bactrim)    2  Status post Renal & Pancreatic Transplants   Plan:   · Continue immunosuppressive therapy w/ Prograf/Prednisone   · Outpatient followup     3  Acute Kidney Injury - Chronic Kidney Disease stage 3   Plan:   · Baseline creatinine of approximately 1 6-1 8 - currently @ 1 69   · Possible rise due to UTI coupled w/ uncontrolled BP in the setting of chronic diabetes   · Avoid nephrotoxins IF possible      4  Accelerated Hypertension   Plan:   · Adjusted Clonidine regimen after discussion with patient - c/w Cardura/Lopressor/Norvasc otherwise - Cardura dose has been increased to 2 mg q h s  · Patient reassured     5  Hypothyroidism   Plan:   · C/w Synthroid - free T4 normal     6  Insulin Dependent Diabetes Mellitus   Plan:   · HgA1c well-controlled @ 5 3   · C/w basal insulin w/ PRN SSI coverage per accuchecks - continue home insulin regimen on discharge    4  Anemia of Chronic Disease   Plan:   · Stable Hgb - continue to monitor         VTE Prophylaxis:  Heparin SC        Time Spent for Care: 34 minutes  More than 50% of total time spent on counseling and coordination of care as described above  Current Length of Stay: 2 day(s)      Code Status: Level 1 - Full Code       Today, Patient Was Seen By: Hilario Lefort, MD    ** Please Note: This note has been constructed using a voice recognition system   **

## 2017-11-02 NOTE — PROGRESS NOTES
NEPHROLOGY PROGRESS NOTE   Seabron Cranker 47 y o  female MRN: [id number]  Unit/Bed#: -01 Encounter: [id number]  Reason for Consult: CKD, renal transplant    ASSESSMENT and PLAN:    48 yo female with PMHx of renal pancreas transplant 1998, DM I, HTN, recurrent UTI/pyelo, recurrent, 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, did require dialysis in 2014, orthostasis, anemia, hypothyroid, MGUS, dCHF, failed pancreas transplant who presents on 10/30 with urinary incontinence  Nephrology is consulted for renal transplant management  Pt was initially started on IV ancef and Infectious Disease team was also consulted       Pt was not discharged yesterday due to hallucination/fearful thoughts     1) CKD with renal transplant - baseline Cr 1 6-1 9     - renal function at baseline  - continue tacrolimus and prednisone (on lower dose due to MGUS) and chronic transplant  - prednisone should be 5 mg daily  - pt has repeat lab slips for next week already  Has appt with me next week  - failed panc transplant  follows with Endocrine as outpatient     2) HTN - repeat BP this afternoon 160s syst with manual BP      - pt will check BP twice daily at home and bring log in to review next week  - BP higher today  - increase doxazosin 2 mg from 1 mg nightly     3) recurrent UTI     - agree with chronic suppressive therapy given patient recurrent UTI/Sepsis  - could attempt low dose bactrim every other day with labwork next week  - discussed with patient  - Urology on board for possible mechanical component  - ID team on board  - Discussed with Primary and ID team      4) failed panc transplant - followed with Endocrine     5) Anemia - monitor for now    Discussed with Primary Team Attending  SUBJECTIVE / INTERVAL HISTORY:    Pt denies complaints  Events from yesterday reviewed  Pt states since, no fearful thoughts   -214    OBJECTIVE:  Current Weight: Weight - Scale: 98 6 kg (217 lb 5 oz)  Vitals:    11/01/17 1533 11/02/17 0020 11/02/17 0901 11/02/17 0903   BP: (!) 178/68 (!) 172/74 (!) 203/79 (!) 214/88   Pulse:  72 78    Resp:  18 18    Temp:  98 6 °F (37 °C) 98 4 °F (36 9 °C)    TempSrc:  Oral Oral    SpO2:  92% 94%    Weight:       Height:           Intake/Output Summary (Last 24 hours) at 11/02/17 1442  Last data filed at 11/02/17 1100   Gross per 24 hour   Intake             1010 ml   Output                0 ml   Net             1010 ml     General: NAD  Skin: no rash  Eyes: anicteric sclera  ENT: moist mucous membrane  Neck: supple  Chest: CTA b/l  CVS: S1S2  Abdomen: soft, nontender  Extremities: trace LE edema, unchanged  : no hinojosa  Neuro: AAOX3  Psych: normal affect      Medications:    Current Facility-Administered Medications:     acetaminophen (TYLENOL) tablet 650 mg, 650 mg, Oral, Q6H PRN, Ana Smalls MD    amLODIPine (NORVASC) tablet 10 mg, 10 mg, Oral, Daily, Ana Smalls MD, 10 mg at 11/02/17 0903    amLODIPine (NORVASC) tablet 5 mg, 5 mg, Oral, QPM, Ana Smalls MD, 5 mg at 11/01/17 1745    ARIPiprazole (ABILIFY) tablet 20 mg, 20 mg, Oral, Daily, Ana Smalls MD, 20 mg at 11/02/17 0905    aspirin chewable tablet 81 mg, 81 mg, Oral, Daily, Ana Smalls MD, 81 mg at 11/02/17 0904    cholecalciferol (VITAMIN D3) tablet 1,000 Units, 1,000 Units, Oral, Daily, Ana Smalls MD, 1,000 Units at 11/02/17 0904    cloNIDine (CATAPRES) tablet 0 2 mg, 0 2 mg, Oral, Daily, Ritu Zheng MD, 0 2 mg at 11/02/17 0906    cloNIDine (CATAPRES) tablet 0 3 mg, 0 3 mg, Oral, After Wade Tuttle MD, 0 3 mg at 11/01/17 1500    cloNIDine (CATAPRES) tablet 0 3 mg, 0 3 mg, Oral, After Sally Nguyen MD, 0 3 mg at 11/01/17 1745    doxazosin (CARDURA) tablet 1 mg, 1 mg, Oral, HS, Ana Smalls MD, 1 mg at 11/01/17 2121    DULoxetine (CYMBALTA) delayed release capsule 60 mg, 60 mg, Oral, BID, Ana Smalls MD, 60 mg at 14/52/06 2224    folic acid (FOLVITE) tablet 1 mg, 1 mg, Oral, BID, Ana Smalls MD, 1 mg at 11/02/17 0904    furosemide (LASIX) tablet 20 mg, 20 mg, Oral, Daily, Juanito Liang MD, 20 mg at 11/02/17 0904    heparin (porcine) subcutaneous injection 5,000 Units, 5,000 Units, Subcutaneous, Q8H [address], 5,000 Units at 11/02/17 1334 **AND** Platelet count, , , Once, Juanito Liang MD    hydrALAZINE (APRESOLINE) tablet 50 mg, 50 mg, Oral, TID, Juanito Liang MD, 50 mg at 11/02/17 0904    insulin glargine (LANTUS) subcutaneous injection 20 Units, 20 Units, Subcutaneous, HS, Juanito Liang MD, 20 Units at 10/31/17 2154    insulin lispro (HumaLOG) 100 units/mL subcutaneous injection 1-6 Units, 1-6 Units, Subcutaneous, TID AC, 1 Units at 11/01/17 1637 **AND** Fingerstick Glucose (POCT), , , TID AC, Juanito Liang MD    labetalol (NORMODYNE) injection 10 mg, 10 mg, Intravenous, Q4H PRN, Praful Aden PA-C, 10 mg at 11/01/17 0023    levothyroxine tablet 88 mcg, 88 mcg, Oral, Early Morning, Juanito Liang MD, 88 mcg at 11/02/17 5674    LORazepam (ATIVAN) tablet 2 mg, 2 mg, Oral, BID PRN, Juanito Liang MD, 2 mg at 11/02/17 1126    metoprolol tartrate (LOPRESSOR) tablet 50 mg, 50 mg, Oral, BID, Juanito Liang MD, 50 mg at 11/02/17 0904    pantoprazole (PROTONIX) EC tablet 40 mg, 40 mg, Oral, Early Morning, Juanito Liang MD, 40 mg at 11/02/17 0250    pravastatin (PRAVACHOL) tablet 80 mg, 80 mg, Oral, Daily, Juanito Liang MD, 80 mg at 11/02/17 7761    predniSONE tablet 5 mg, 5 mg, Oral, Daily, Carmen Mclaughlin MD, 5 mg at 11/02/17 0904    rOPINIRole (REQUIP) tablet 0 25 mg, 0 25 mg, Oral, BID, Juanito Liang MD, 0 25 mg at 11/02/17 0906    sertraline (ZOLOFT) tablet 200 mg, 200 mg, Oral, Daily, Juanito Liang MD, 200 mg at 11/02/17 0903    sodium bicarbonate tablet 650 mg, 650 mg, Oral, TID, Juanito Liang MD, 650 mg at 11/02/17 0906    tacrolimus (PROGRAF) capsule 3 mg, 3 mg, Oral, HS, MER Virk, 3 mg at 11/01/17 2120    tacrolimus (PROGRAF) capsule 4 mg, 4 mg, Oral, Daily, Juanito Liang MD, 4 mg at 11/02/17 0906    traMADol (ULTRAM) tablet 50 mg, 50 mg, Oral, Q6H PRN, Praful Aden PA-C, 50 mg at 10/31/17 0014    traZODone (DESYREL) tablet 150 mg, 150 mg, Oral, HS, Bakari Ervin MD, 150 mg at 11/01/17 2121    Laboratory Results:    Results from last 7 days  Lab Units 11/02/17  0521 11/01/17  0500 10/31/17  0630 10/30/17  0922   WBC Thousand/uL 6 63 7 78 7 25 9 74   HEMOGLOBIN g/dL 11 4* 11 3* 11 4* 11 3*   HEMATOCRIT % 35 7 34 8 34 9 34 4*   PLATELETS Thousands/uL 188 201 197 196   SODIUM mmol/L 137  --  139 136   POTASSIUM mmol/L 3 3*  --  3 7 3 6   CHLORIDE mmol/L 107  --  111* 108   CO2 mmol/L 23  --  21 20*   BUN mg/dL 30*  --  33* 36*   CREATININE mg/dL 1 69* 1 69* 1 91* 1 79*   CALCIUM mg/dL 8 8  --  9 1 9 3   ALBUMIN g/dL  --   --   --  3 1*   TOTAL PROTEIN g/dL  --   --   --  9 7*   GLUCOSE RANDOM mg/dL 117  --  147* 128

## 2017-11-02 NOTE — PHYSICIAN ADVISOR
Current patient class: Inpatient  The patient is currently on Hospital Day: 4      The patient was admitted to the hospital at [phone] on 10/31/17 for the following diagnosis:  Incontinence [R32]  UTI (urinary tract infection) [N39 0]  Urinary incontinence [R32]  Calculus, bladder, diverticulum [N21 0]  Immunosuppression ([facility] [address] ) [D89 9]  Chronic kidney disease, stage 3 [N18 3]  Status post simultaneous kidney and pancreas transplant ([facility] [address] ) [Z94 0, Z94 83]  Renal transplant recipient [Z94 0]       There is documentation in the medical record of an expected length of stay of at least 2 midnights  The patient is therefore expected to satisfy the 2 midnight benchmark and given the 2 midnight presumption is appropriate for INPATIENT ADMISSION  Given this expectation of a satisfying stay, CMS instructs us that the patient is most often appropriate for inpatient admission under part A provided medical necessity is documented in the chart  After review of the relevant documentation, labs, vital signs and test results, the patient is appropriate for INPATIENT ADMISSION  Admission to the hospital as an inpatient is a complex decision making process which requires the practitioner to consider the patients presenting complaint, history and physical examination and all relevant testing  With this in mind, in this case, the patient was deemed appropriate for INPATIENT ADMISSION  After review of the documentation and testing available at the time of the admission I concur with this clinical determination of medical necessity  Rationale is as follows:     The patient is a 47 yrs old Female who presented to the ED at 10/30/2017  8:07 AM with a chief complaint of Urinary Incontinence (Pt has recent UTI, treated with Keflex, total of 3 rounds, UTI symptoms continue   )    The patients vitals on arrival were ED Triage Vitals   Temperature Pulse Respirations Blood Pressure SpO2   10/30/17 0909 10/30/17 0820 10/30/17 0820 10/30/17 0820 10/30/17 0820   97 9 °F (36 6 °C) 81 20 (!) 207/84 97 %      Temp Source Heart Rate Source Patient Position - Orthostatic VS BP Location FiO2 (%)   10/30/17 0909 10/30/17 0820 10/30/17 0820 10/30/17 0820 --   Oral Monitor Sitting Left arm       Pain Score       10/30/17 0810       No Pain           Past Medical History:   Diagnosis Date    Anemia     Cancer (HCC)     Multiple myeloma    Chronic diastolic (congestive) heart failure 9/18/2017    Diabetes mellitus ([facility] [address] )     Previous, controlled with diet    Disease of thyroid gland     History of transfusion     Hypertension     Night blindness     Psychiatric disorder     Renal disorder     Retinopathy     Status post simultaneous kidney and pancreas transplant ([facility] [address] )     [facility]      Past Surgical History:   Procedure Laterality Date    COMBINED KIDNEY-PANCREAS TRANSPLANT N/A     CYSTOSCOPY N/A 10/13/2016    Procedure: CYSTOSCOPY, retrograde pyelogram, biopsy of ureteral polyp;   Surgeon: Nishant Damico MD;  [facility]: [facility];  Service:    [facility] EYE SURGERY      cataracts    FOOT AMPUTATION THROUGH METATARSAL Left     HALLUX VALGUS CORRECTION Right     NEPHRECTOMY TRANSPLANTED ORGAN             Consults have been placed to:   IP CONSULT TO UROLOGY  IP CONSULT TO CASE MANAGEMENT  IP CONSULT TO INFECTIOUS DISEASES  IP CONSULT TO NEPHROLOGY  IP CONSULT TO PSYCHIATRY    Vitals:    11/01/17 1356 11/01/17 1520 11/01/17 1533 11/02/17 0020   BP: 170/60 (!) 175/75 (!) 178/68 (!) 172/74   Pulse:  78  72   Resp:  18  18   Temp:  98 5 °F (36 9 °C)  98 6 °F (37 °C)   TempSrc:  Oral  Oral   SpO2:  95%  92%   Weight:       Height:           Most recent labs:    Recent Labs      10/30/17   0922  10/31/17   0630  11/01/17   0500   WBC  9 74  7 25  7 78   HGB  11 3*  11 4*  11 3*   HCT  34 4*  34 9  34 8   PLT  196  197  201   K  3 6  3 7   --    NA  136  139   --    CALCIUM  9 3  9 1   --    BUN  36*  33*   --    CREATININE  1 79* 1 91*  1 69*   LIPASE  120   --    --    AST  15   --    --    ALT  25   --    --    ALKPHOS  117*   --    --    BILITOT  0 34   --    --        Scheduled Meds:  amLODIPine 10 mg Oral Daily   amLODIPine 5 mg Oral QPM   ARIPiprazole 20 mg Oral Daily   aspirin 81 mg Oral Daily   cholecalciferol 1,000 Units Oral Daily   cloNIDine 0 2 mg Oral Daily   cloNIDine 0 3 mg Oral After Lunch   cloNIDine 0 3 mg Oral After Dinner   doxazosin 1 mg Oral HS   DULoxetine 60 mg Oral BID   folic acid 1 mg Oral BID   furosemide 20 mg Oral Daily   heparin (porcine) 5,000 Units Subcutaneous Q8H [address]   hydrALAZINE 50 mg Oral TID   insulin glargine 20 Units Subcutaneous HS   insulin lispro 1-6 Units Subcutaneous TID AC   levothyroxine 88 mcg Oral Early Morning   metoprolol tartrate 50 mg Oral BID   pantoprazole 40 mg Oral Early Morning   pravastatin 80 mg Oral Daily   predniSONE 5 mg Oral Daily   rOPINIRole 0 25 mg Oral BID   sertraline 200 mg Oral Daily   sodium bicarbonate 650 mg Oral TID   tacrolimus 3 mg Oral HS   tacrolimus 4 mg Oral Daily   traZODone 150 mg Oral HS     Continuous Infusions:   PRN Meds:   acetaminophen    labetalol    LORazepam    traMADol    Surgical procedures (if appropriate):

## 2017-11-06 ENCOUNTER — TRANSCRIBE ORDERS (OUTPATIENT)
Dept: ADMINISTRATIVE | Age: 52
End: 2017-11-06

## 2017-11-06 ENCOUNTER — APPOINTMENT (OUTPATIENT)
Dept: LAB | Age: 52
End: 2017-11-06
Payer: MEDICARE

## 2017-11-06 DIAGNOSIS — Z94.0 KIDNEY REPLACED BY TRANSPLANT: ICD-10-CM

## 2017-11-06 DIAGNOSIS — Z94.0 KIDNEY REPLACED BY TRANSPLANT: Primary | ICD-10-CM

## 2017-11-06 LAB
ANION GAP SERPL CALCULATED.3IONS-SCNC: 9 MMOL/L (ref 4–13)
BACTERIA UR QL AUTO: ABNORMAL /HPF
BILIRUB UR QL STRIP: NEGATIVE
BUN SERPL-MCNC: 46 MG/DL (ref 5–25)
CALCIUM SERPL-MCNC: 9.2 MG/DL (ref 8.3–10.1)
CHLORIDE SERPL-SCNC: 109 MMOL/L (ref 100–108)
CLARITY UR: ABNORMAL
CO2 SERPL-SCNC: 19 MMOL/L (ref 21–32)
COLOR UR: YELLOW
CREAT SERPL-MCNC: 1.92 MG/DL (ref 0.6–1.3)
CREAT UR-MCNC: 69.5 MG/DL
ERYTHROCYTE [DISTWIDTH] IN BLOOD BY AUTOMATED COUNT: 16.9 % (ref 11.6–15.1)
GFR SERPL CREATININE-BSD FRML MDRD: 29 ML/MIN/1.73SQ M
GLUCOSE P FAST SERPL-MCNC: 133 MG/DL (ref 65–99)
GLUCOSE UR STRIP-MCNC: NEGATIVE MG/DL
HCT VFR BLD AUTO: 36.2 % (ref 34.8–46.1)
HGB BLD-MCNC: 11.4 G/DL (ref 11.5–15.4)
HGB UR QL STRIP.AUTO: NEGATIVE
HYALINE CASTS #/AREA URNS LPF: ABNORMAL /LPF
KETONES UR STRIP-MCNC: NEGATIVE MG/DL
LEUKOCYTE ESTERASE UR QL STRIP: ABNORMAL
MCH RBC QN AUTO: 28.6 PG (ref 26.8–34.3)
MCHC RBC AUTO-ENTMCNC: 31.5 G/DL (ref 31.4–37.4)
MCV RBC AUTO: 91 FL (ref 82–98)
NITRITE UR QL STRIP: NEGATIVE
NON-SQ EPI CELLS URNS QL MICRO: ABNORMAL /HPF
PH UR STRIP.AUTO: 7 [PH] (ref 4.5–8)
PLATELET # BLD AUTO: 231 THOUSANDS/UL (ref 149–390)
PMV BLD AUTO: 13 FL (ref 8.9–12.7)
POTASSIUM SERPL-SCNC: 3.6 MMOL/L (ref 3.5–5.3)
PROT UR STRIP-MCNC: ABNORMAL MG/DL
PROT UR-MCNC: 76 MG/DL
PROT/CREAT UR: 1.09 MG/G{CREAT} (ref 0–0.1)
RBC # BLD AUTO: 3.98 MILLION/UL (ref 3.81–5.12)
RBC #/AREA URNS AUTO: ABNORMAL /HPF
SODIUM SERPL-SCNC: 137 MMOL/L (ref 136–145)
SP GR UR STRIP.AUTO: 1.01 (ref 1–1.03)
UROBILINOGEN UR QL STRIP.AUTO: 0.2 E.U./DL
WBC # BLD AUTO: 15.73 THOUSAND/UL (ref 4.31–10.16)
WBC #/AREA URNS AUTO: ABNORMAL /HPF

## 2017-11-06 PROCEDURE — 36415 COLL VENOUS BLD VENIPUNCTURE: CPT

## 2017-11-06 PROCEDURE — 80197 ASSAY OF TACROLIMUS: CPT

## 2017-11-06 PROCEDURE — 84156 ASSAY OF PROTEIN URINE: CPT | Performed by: INTERNAL MEDICINE

## 2017-11-06 PROCEDURE — 81001 URINALYSIS AUTO W/SCOPE: CPT | Performed by: INTERNAL MEDICINE

## 2017-11-06 PROCEDURE — 80048 BASIC METABOLIC PNL TOTAL CA: CPT

## 2017-11-06 PROCEDURE — 85027 COMPLETE CBC AUTOMATED: CPT

## 2017-11-06 PROCEDURE — 82570 ASSAY OF URINE CREATININE: CPT | Performed by: INTERNAL MEDICINE

## 2017-11-07 ENCOUNTER — ALLSCRIPTS OFFICE VISIT (OUTPATIENT)
Dept: OTHER | Facility: OTHER | Age: 52
End: 2017-11-07

## 2017-11-08 ENCOUNTER — ALLSCRIPTS OFFICE VISIT (OUTPATIENT)
Dept: OTHER | Facility: OTHER | Age: 52
End: 2017-11-08

## 2017-11-09 LAB — TACROLIMUS BLD LC/MS/MS-MCNC: 5.8 NG/ML (ref 2–20)

## 2017-11-13 ENCOUNTER — GENERIC CONVERSION - ENCOUNTER (OUTPATIENT)
Dept: OTHER | Facility: OTHER | Age: 52
End: 2017-11-13

## 2017-11-13 NOTE — DISCHARGE SUMMARY
Addendum to the discharge summary  Patient was discharged on November 2, 2017, not November 1st as was documented in the discharge summary   MER [place]

## 2017-11-16 ENCOUNTER — HOSPITAL ENCOUNTER (INPATIENT)
Facility: HOSPITAL | Age: 52
LOS: 4 days | Discharge: HOME HEALTH | DRG: 682 | End: 2017-11-20
Attending: EMERGENCY MEDICINE | Admitting: INTERNAL MEDICINE
Payer: MEDICARE

## 2017-11-16 ENCOUNTER — APPOINTMENT (EMERGENCY)
Dept: RADIOLOGY | Facility: HOSPITAL | Age: 52
DRG: 682 | End: 2017-11-16
Payer: MEDICARE

## 2017-11-16 DIAGNOSIS — Z94.0 RENAL TRANSPLANT RECIPIENT: ICD-10-CM

## 2017-11-16 DIAGNOSIS — E87.8 LOW BICARBONATE: ICD-10-CM

## 2017-11-16 DIAGNOSIS — R10.13 EPIGASTRIC PAIN: ICD-10-CM

## 2017-11-16 DIAGNOSIS — D84.9 IMMUNOSUPPRESSION (HCC): ICD-10-CM

## 2017-11-16 DIAGNOSIS — E83.52 HYPERCALCEMIA: ICD-10-CM

## 2017-11-16 DIAGNOSIS — N39.0 UTI (URINARY TRACT INFECTION): Primary | ICD-10-CM

## 2017-11-16 DIAGNOSIS — R12 HEARTBURN: ICD-10-CM

## 2017-11-16 DIAGNOSIS — R41.0 CONFUSION: ICD-10-CM

## 2017-11-16 DIAGNOSIS — N17.9 ACUTE KIDNEY INJURY (HCC): ICD-10-CM

## 2017-11-16 DIAGNOSIS — E10.9 TYPE 1 DIABETES MELLITUS (HCC): Chronic | ICD-10-CM

## 2017-11-16 DIAGNOSIS — N21.0: ICD-10-CM

## 2017-11-16 DIAGNOSIS — R11.2 NAUSEA AND VOMITING: ICD-10-CM

## 2017-11-16 PROBLEM — R11.0 NAUSEA: Status: ACTIVE | Noted: 2017-11-16

## 2017-11-16 PROBLEM — D47.2 MGUS (MONOCLONAL GAMMOPATHY OF UNKNOWN SIGNIFICANCE): Chronic | Status: ACTIVE | Noted: 2017-11-16

## 2017-11-16 PROBLEM — N18.30 STAGE 3 CHRONIC KIDNEY DISEASE (HCC): Chronic | Status: ACTIVE | Noted: 2017-11-16

## 2017-11-16 PROBLEM — F32.9 MAJOR DEPRESSIVE DISORDER: Chronic | Status: ACTIVE | Noted: 2017-11-16

## 2017-11-16 PROBLEM — F41.9 ANXIETY: Chronic | Status: ACTIVE | Noted: 2017-11-16

## 2017-11-16 LAB
25(OH)D3 SERPL-MCNC: 43.9 NG/ML (ref 30–100)
ACETONE SERPL-MCNC: NEGATIVE MG/DL
ALBUMIN SERPL BCP-MCNC: 3.2 G/DL (ref 3.5–5)
ALP SERPL-CCNC: 127 U/L (ref 46–116)
ALT SERPL W P-5'-P-CCNC: 25 U/L (ref 12–78)
ANION GAP SERPL CALCULATED.3IONS-SCNC: 9 MMOL/L (ref 4–13)
AST SERPL W P-5'-P-CCNC: 22 U/L (ref 5–45)
BACTERIA UR QL AUTO: ABNORMAL /HPF
BASOPHILS # BLD AUTO: 0.05 THOUSANDS/ΜL (ref 0–0.1)
BASOPHILS NFR BLD AUTO: 1 % (ref 0–1)
BILIRUB SERPL-MCNC: 0.28 MG/DL (ref 0.2–1)
BILIRUB UR QL STRIP: NEGATIVE
BUN SERPL-MCNC: 47 MG/DL (ref 5–25)
CALCIUM SERPL-MCNC: 11.1 MG/DL (ref 8.3–10.1)
CHLORIDE SERPL-SCNC: 114 MMOL/L (ref 100–108)
CLARITY UR: ABNORMAL
CLARITY, POC: NORMAL
CO2 SERPL-SCNC: 14 MMOL/L (ref 21–32)
COLOR UR: YELLOW
CREAT SERPL-MCNC: 2.09 MG/DL (ref 0.6–1.3)
EOSINOPHIL # BLD AUTO: 0.06 THOUSAND/ΜL (ref 0–0.61)
EOSINOPHIL NFR BLD AUTO: 1 % (ref 0–6)
ERYTHROCYTE [DISTWIDTH] IN BLOOD BY AUTOMATED COUNT: 17.2 % (ref 11.6–15.1)
GFR SERPL CREATININE-BSD FRML MDRD: 26 ML/MIN/1.73SQ M
GLUCOSE SERPL-MCNC: 141 MG/DL (ref 65–140)
GLUCOSE SERPL-MCNC: 156 MG/DL (ref 65–140)
GLUCOSE UR STRIP-MCNC: NEGATIVE MG/DL
HCT VFR BLD AUTO: 45.9 % (ref 34.8–46.1)
HGB BLD-MCNC: 15.1 G/DL (ref 11.5–15.4)
HGB UR QL STRIP.AUTO: ABNORMAL
KETONES UR STRIP-MCNC: NEGATIVE MG/DL
LACTATE SERPL-SCNC: 0.5 MMOL/L (ref 0.5–2)
LEUKOCYTE ESTERASE UR QL STRIP: ABNORMAL
LIPASE SERPL-CCNC: 147 U/L (ref 73–393)
LYMPHOCYTES # BLD AUTO: 1.29 THOUSANDS/ΜL (ref 0.6–4.47)
LYMPHOCYTES NFR BLD AUTO: 14 % (ref 14–44)
MCH RBC QN AUTO: 29.6 PG (ref 26.8–34.3)
MCHC RBC AUTO-ENTMCNC: 32.9 G/DL (ref 31.4–37.4)
MCV RBC AUTO: 90 FL (ref 82–98)
MONOCYTES # BLD AUTO: 0.49 THOUSAND/ΜL (ref 0.17–1.22)
MONOCYTES NFR BLD AUTO: 5 % (ref 4–12)
NEUTROPHILS # BLD AUTO: 7.67 THOUSANDS/ΜL (ref 1.85–7.62)
NEUTS SEG NFR BLD AUTO: 79 % (ref 43–75)
NITRITE UR QL STRIP: POSITIVE
NON-SQ EPI CELLS URNS QL MICRO: ABNORMAL /HPF
NRBC BLD AUTO-RTO: 0 /100 WBCS
PH UR STRIP.AUTO: 7 [PH] (ref 4.5–8)
PLATELET # BLD AUTO: 200 THOUSANDS/UL (ref 149–390)
PMV BLD AUTO: 11.6 FL (ref 8.9–12.7)
POTASSIUM SERPL-SCNC: 4.8 MMOL/L (ref 3.5–5.3)
PROT SERPL-MCNC: 10.5 G/DL (ref 6.4–8.2)
PROT UR STRIP-MCNC: ABNORMAL MG/DL
RBC # BLD AUTO: 5.1 MILLION/UL (ref 3.81–5.12)
RBC #/AREA URNS AUTO: ABNORMAL /HPF
SODIUM SERPL-SCNC: 137 MMOL/L (ref 136–145)
SP GR UR STRIP.AUTO: 1.02 (ref 1–1.03)
UROBILINOGEN UR QL STRIP.AUTO: 0.2 E.U./DL
WBC # BLD AUTO: 9.59 THOUSAND/UL (ref 4.31–10.16)
WBC #/AREA URNS AUTO: ABNORMAL /HPF

## 2017-11-16 PROCEDURE — 87186 SC STD MICRODIL/AGAR DIL: CPT

## 2017-11-16 PROCEDURE — 83605 ASSAY OF LACTIC ACID: CPT | Performed by: INTERNAL MEDICINE

## 2017-11-16 PROCEDURE — 96374 THER/PROPH/DIAG INJ IV PUSH: CPT

## 2017-11-16 PROCEDURE — 87077 CULTURE AEROBIC IDENTIFY: CPT

## 2017-11-16 PROCEDURE — 82009 KETONE BODYS QUAL: CPT | Performed by: INTERNAL MEDICINE

## 2017-11-16 PROCEDURE — 80053 COMPREHEN METABOLIC PANEL: CPT | Performed by: EMERGENCY MEDICINE

## 2017-11-16 PROCEDURE — 99285 EMERGENCY DEPT VISIT HI MDM: CPT

## 2017-11-16 PROCEDURE — 81002 URINALYSIS NONAUTO W/O SCOPE: CPT | Performed by: EMERGENCY MEDICINE

## 2017-11-16 PROCEDURE — 36415 COLL VENOUS BLD VENIPUNCTURE: CPT

## 2017-11-16 PROCEDURE — 83690 ASSAY OF LIPASE: CPT | Performed by: EMERGENCY MEDICINE

## 2017-11-16 PROCEDURE — 74176 CT ABD & PELVIS W/O CONTRAST: CPT

## 2017-11-16 PROCEDURE — 82948 REAGENT STRIP/BLOOD GLUCOSE: CPT

## 2017-11-16 PROCEDURE — 87086 URINE CULTURE/COLONY COUNT: CPT

## 2017-11-16 PROCEDURE — 82397 CHEMILUMINESCENT ASSAY: CPT

## 2017-11-16 PROCEDURE — 85025 COMPLETE CBC W/AUTO DIFF WBC: CPT | Performed by: EMERGENCY MEDICINE

## 2017-11-16 PROCEDURE — 81001 URINALYSIS AUTO W/SCOPE: CPT

## 2017-11-16 PROCEDURE — 82306 VITAMIN D 25 HYDROXY: CPT

## 2017-11-16 PROCEDURE — 87040 BLOOD CULTURE FOR BACTERIA: CPT | Performed by: PHYSICIAN ASSISTANT

## 2017-11-16 RX ORDER — ONDANSETRON 2 MG/ML
4 INJECTION INTRAMUSCULAR; INTRAVENOUS EVERY 6 HOURS PRN
Status: DISCONTINUED | OUTPATIENT
Start: 2017-11-16 | End: 2017-11-20 | Stop reason: HOSPADM

## 2017-11-16 RX ORDER — TACROLIMUS 1 MG/1
4 CAPSULE ORAL DAILY
Status: DISCONTINUED | OUTPATIENT
Start: 2017-11-17 | End: 2017-11-20 | Stop reason: HOSPADM

## 2017-11-16 RX ORDER — PREDNISONE 1 MG/1
5 TABLET ORAL DAILY
Status: DISCONTINUED | OUTPATIENT
Start: 2017-11-17 | End: 2017-11-20 | Stop reason: HOSPADM

## 2017-11-16 RX ORDER — PANTOPRAZOLE SODIUM 40 MG/1
40 TABLET, DELAYED RELEASE ORAL
Status: DISCONTINUED | OUTPATIENT
Start: 2017-11-17 | End: 2017-11-20 | Stop reason: HOSPADM

## 2017-11-16 RX ORDER — METOPROLOL TARTRATE 50 MG/1
50 TABLET, FILM COATED ORAL 2 TIMES DAILY
Status: DISCONTINUED | OUTPATIENT
Start: 2017-11-16 | End: 2017-11-20 | Stop reason: HOSPADM

## 2017-11-16 RX ORDER — SERTRALINE HYDROCHLORIDE 100 MG/1
100 TABLET, FILM COATED ORAL 2 TIMES DAILY
Status: DISCONTINUED | OUTPATIENT
Start: 2017-11-16 | End: 2017-11-20 | Stop reason: HOSPADM

## 2017-11-16 RX ORDER — ASPIRIN 81 MG/1
81 TABLET, CHEWABLE ORAL DAILY
Status: DISCONTINUED | OUTPATIENT
Start: 2017-11-17 | End: 2017-11-20 | Stop reason: HOSPADM

## 2017-11-16 RX ORDER — FOLIC ACID 1 MG/1
1 TABLET ORAL 2 TIMES DAILY
Status: DISCONTINUED | OUTPATIENT
Start: 2017-11-16 | End: 2017-11-20 | Stop reason: HOSPADM

## 2017-11-16 RX ORDER — SENNOSIDES 8.6 MG
1 TABLET ORAL
Status: DISCONTINUED | OUTPATIENT
Start: 2017-11-16 | End: 2017-11-20 | Stop reason: HOSPADM

## 2017-11-16 RX ORDER — ZOLPIDEM TARTRATE 5 MG/1
5 TABLET ORAL
Status: DISCONTINUED | OUTPATIENT
Start: 2017-11-16 | End: 2017-11-20 | Stop reason: HOSPADM

## 2017-11-16 RX ORDER — FENTANYL CITRATE 50 UG/ML
25 INJECTION, SOLUTION INTRAMUSCULAR; INTRAVENOUS ONCE
Status: COMPLETED | OUTPATIENT
Start: 2017-11-16 | End: 2017-11-16

## 2017-11-16 RX ORDER — LEVOTHYROXINE SODIUM 88 UG/1
88 TABLET ORAL
Status: DISCONTINUED | OUTPATIENT
Start: 2017-11-17 | End: 2017-11-20 | Stop reason: HOSPADM

## 2017-11-16 RX ORDER — PRAVASTATIN SODIUM 80 MG/1
80 TABLET ORAL DAILY
Status: DISCONTINUED | OUTPATIENT
Start: 2017-11-17 | End: 2017-11-20 | Stop reason: HOSPADM

## 2017-11-16 RX ORDER — ARIPIPRAZOLE 10 MG/1
20 TABLET ORAL DAILY
Status: DISCONTINUED | OUTPATIENT
Start: 2017-11-17 | End: 2017-11-20 | Stop reason: HOSPADM

## 2017-11-16 RX ORDER — INSULIN GLARGINE 100 [IU]/ML
20 INJECTION, SOLUTION SUBCUTANEOUS
Status: DISCONTINUED | OUTPATIENT
Start: 2017-11-17 | End: 2017-11-20 | Stop reason: HOSPADM

## 2017-11-16 RX ORDER — CLONIDINE HYDROCHLORIDE 0.1 MG/1
0.2 TABLET ORAL EVERY 24 HOURS
Status: DISCONTINUED | OUTPATIENT
Start: 2017-11-17 | End: 2017-11-20 | Stop reason: HOSPADM

## 2017-11-16 RX ORDER — TACROLIMUS 1 MG/1
3 CAPSULE ORAL
Status: DISCONTINUED | OUTPATIENT
Start: 2017-11-16 | End: 2017-11-20 | Stop reason: HOSPADM

## 2017-11-16 RX ORDER — HEPARIN SODIUM 5000 [USP'U]/ML
5000 INJECTION, SOLUTION INTRAVENOUS; SUBCUTANEOUS EVERY 8 HOURS SCHEDULED
Status: DISCONTINUED | OUTPATIENT
Start: 2017-11-16 | End: 2017-11-20 | Stop reason: HOSPADM

## 2017-11-16 RX ORDER — AMLODIPINE BESYLATE 10 MG/1
10 TABLET ORAL DAILY
Status: DISCONTINUED | OUTPATIENT
Start: 2017-11-17 | End: 2017-11-20 | Stop reason: HOSPADM

## 2017-11-16 RX ORDER — ACETAMINOPHEN 325 MG/1
650 TABLET ORAL EVERY 6 HOURS PRN
Status: DISCONTINUED | OUTPATIENT
Start: 2017-11-16 | End: 2017-11-20 | Stop reason: HOSPADM

## 2017-11-16 RX ORDER — DOXAZOSIN 2 MG/1
2 TABLET ORAL
Status: DISCONTINUED | OUTPATIENT
Start: 2017-11-16 | End: 2017-11-20 | Stop reason: HOSPADM

## 2017-11-16 RX ORDER — LORAZEPAM 1 MG/1
2 TABLET ORAL 2 TIMES DAILY PRN
Status: DISCONTINUED | OUTPATIENT
Start: 2017-11-16 | End: 2017-11-20 | Stop reason: HOSPADM

## 2017-11-16 RX ORDER — DULOXETIN HYDROCHLORIDE 60 MG/1
60 CAPSULE, DELAYED RELEASE ORAL 2 TIMES DAILY
Status: DISCONTINUED | OUTPATIENT
Start: 2017-11-16 | End: 2017-11-20 | Stop reason: HOSPADM

## 2017-11-16 RX ORDER — HYDRALAZINE HYDROCHLORIDE 50 MG/1
50 TABLET, FILM COATED ORAL 3 TIMES DAILY
Status: DISCONTINUED | OUTPATIENT
Start: 2017-11-16 | End: 2017-11-20 | Stop reason: HOSPADM

## 2017-11-16 RX ORDER — CLONIDINE HYDROCHLORIDE 0.1 MG/1
0.3 TABLET ORAL 2 TIMES DAILY
Status: DISCONTINUED | OUTPATIENT
Start: 2017-11-16 | End: 2017-11-20 | Stop reason: HOSPADM

## 2017-11-16 RX ORDER — ROPINIROLE 0.25 MG/1
0.25 TABLET, FILM COATED ORAL 2 TIMES DAILY
Status: DISCONTINUED | OUTPATIENT
Start: 2017-11-16 | End: 2017-11-20 | Stop reason: HOSPADM

## 2017-11-16 RX ADMIN — FOLIC ACID 1 MG: 1 TABLET ORAL at 22:31

## 2017-11-16 RX ADMIN — FENTANYL CITRATE 25 MCG: 50 INJECTION, SOLUTION INTRAMUSCULAR; INTRAVENOUS at 18:43

## 2017-11-16 RX ADMIN — TACROLIMUS 3 MG: 1 CAPSULE ORAL at 22:38

## 2017-11-16 RX ADMIN — DULOXETINE HYDROCHLORIDE 60 MG: 60 CAPSULE, DELAYED RELEASE ORAL at 22:30

## 2017-11-16 RX ADMIN — DOXAZOSIN 2 MG: 2 TABLET ORAL at 23:49

## 2017-11-16 RX ADMIN — SERTRALINE HYDROCHLORIDE 100 MG: 100 TABLET ORAL at 22:48

## 2017-11-16 RX ADMIN — FENTANYL CITRATE 25 MCG: 50 INJECTION, SOLUTION INTRAMUSCULAR; INTRAVENOUS at 14:38

## 2017-11-16 RX ADMIN — DEXTROSE MONOHYDRATE 125 ML/HR: 50 INJECTION, SOLUTION INTRAVENOUS at 18:43

## 2017-11-16 RX ADMIN — HYDRALAZINE HYDROCHLORIDE 50 MG: 50 TABLET, FILM COATED ORAL at 22:31

## 2017-11-16 RX ADMIN — CEFAZOLIN SODIUM 1000 MG: 1 SOLUTION INTRAVENOUS at 22:48

## 2017-11-16 RX ADMIN — HEPARIN SODIUM 5000 UNITS: 5000 INJECTION, SOLUTION INTRAVENOUS; SUBCUTANEOUS at 22:39

## 2017-11-16 RX ADMIN — ACETAMINOPHEN 650 MG: 325 TABLET, FILM COATED ORAL at 22:30

## 2017-11-16 RX ADMIN — ROPINIROLE 0.25 MG: 0.25 TABLET, FILM COATED ORAL at 22:30

## 2017-11-16 RX ADMIN — TRAZODONE HYDROCHLORIDE 150 MG: 100 TABLET ORAL at 22:38

## 2017-11-16 RX ADMIN — CLONIDINE HYDROCHLORIDE 0.3 MG: 0.1 TABLET ORAL at 22:31

## 2017-11-16 NOTE — ED NOTES
Pharmacy consulted, also questioning running Bicarb through a 22G IV in hand  Physician aware of IV access and multiple attempts made        Geneva Szymanski RN  11/16/17 5406

## 2017-11-16 NOTE — ED NOTES
Pt refusing Bloodwork  Pt stuck by RN x2 and tech x2   Pt refusing anymore attempts       Stew Anthony RN  11/16/17 4423

## 2017-11-16 NOTE — CONSULTS
Consultation - Nephrology   Seabron Cranker 47 y o  female MRN: [id number]  Unit/Bed#: ED 17 Encounter: [id number]    ASSESSMENT and PLAN:  1   CKD stage 3:  Baseline creatinine 1 6-1 9   -patient follow up with Dr Fausto Arcos in our office   -creatinine today is at the upper end of her baseline around 2  2  Status post renal transplant 20 years ago [facility]:  Currently on tacrolimus and prednisone (she is on lower dose immunosuppressive due to MGUS)   -continue prednisone 5 mg daily   -continue Prograf 4 mg in the morning and 3 mg in the afternoon   -she also has a failed pancreatic transplant and follows endocrine as an outpatient   -prograf level ordered   3  Hypercalcemia:  Most likely related to dehydration   -she was taking vitamin D as an outpatient   -will start IV fluids  4  Low bicarb:  Start bicarb drip  5  Recurrent UTI:  Current urine studies are pending but this is how she has presented from multiple UTIs  6  Abdominal pain:  CT scan abdomen and pelvis pending as well as ultrasound of her kidney transplant  7  Confusion:  Likely related to hypercalcemia and dehydration vs UTI  8  MGUS: needs outpatient hematology follow up      HISTORY OF PRESENT ILLNESS:  Requesting Physician: Mildred Pereira MD  Reason for Consult:  Renal transplant    Seabron Cranker is a 47y o  year old female who was admitted to [facility] after presenting with confusion  Patient has a history of a renal and pancreas transplant about 20 years ago and frequently has admissions for UTIs  For the past 4 days she has not been doing well  She has been having some confusion and has not been eating or drinking well  She also complains of abdominal pain  Her father is concerned as to whether not she is actually taking her medications  She also had some occasional vomiting at home but none for the past 2 days  Family has some concerns about whether not she can continue to care for herself at home    Normally sequela of confusion and poor p o  intake are symptoms of her UTI  Of note on her last admission she was started on Bactrim 3 times a week to try and prevent UTI recurrence  Renal was consulted due to history of renal transplant  PAST MEDICAL HISTORY:  Past Medical History:   Diagnosis Date    Anemia     Cancer ([facility] )     Multiple myeloma    Chronic diastolic (congestive) heart failure 9/18/2017    Diabetes mellitus ([place] [address] )     Previous, controlled with diet    Disease of thyroid gland     History of transfusion     Hypertension     Night blindness     Psychiatric disorder     Renal disorder     Retinopathy     Status post simultaneous kidney and pancreas transplant ([place] [address] )     Toe amputation status ([place] [address] )        PAST SURGICAL HISTORY:  Past Surgical History:   Procedure Laterality Date    COMBINED KIDNEY-PANCREAS TRANSPLANT N/A     CYSTOSCOPY N/A 10/13/2016    Procedure: CYSTOSCOPY, retrograde pyelogram, biopsy of ureteral polyp; Surgeon: Eloisa Solomon MD;  Location: [facility];  Service:    [facility] EYE SURGERY      cataracts    FOOT AMPUTATION THROUGH METATARSAL Left     HALLUX VALGUS CORRECTION Right     NEPHRECTOMY TRANSPLANTED ORGAN         ALLERGIES:  Allergies   Allergen Reactions    Morphine And Related GI Intolerance    Penicillins Hives     Tolerates cefazolin    Myrbetriq [Mirabegron] Hives       SOCIAL HISTORY:  History   Alcohol Use No     History   Drug Use No     History   Smoking Status    Former Smoker    Quit date: 4/28/2012   Smokeless Tobacco    Former User       FAMILY HISTORY:  Family History   Problem Relation Age of Onset    Hypertension Mother     Hypertension Father     Cancer Maternal Grandfather     Cancer Paternal Grandmother     Cancer Paternal Grandfather        MEDICATIONS:  Home meds reviewed    REVIEW OF SYSTEMS:  A complete review of systems was done  Pertinent positives and negatives noted in the HPI but otherwise the review of systems is negative      PHYSICAL EXAM:  Current Weight: Weight - Scale: 90 7 kg (200 lb)  First Weight: Weight - Scale: 90 7 kg (200 lb)  Vitals:    11/16/17 1515   BP: 170/76   Pulse: 74   Resp: 18   Temp:    SpO2: 98%      General:  No acute distress  Skin:  No rash  Eyes:  Sclerae anicteric  ENT:  Dry oral mucosa  Neck:  Supple, symmetric  Chest:  Clear to auscultation bilaterally  CVS:  Regular rate and rhythm  Abdomen:  Soft, tender to palpation diffusely but especially left lower quadrant  Extremities:  No edema  Neuro:  Awake and alert but a little confused  Psych:  Pleasant and cooperative    Lab Results:   Lab Results   Component Value Date    WBC 9 59 11/16/2017    HGB 15 1 11/16/2017    HCT 45 9 11/16/2017    MCV 90 11/16/2017     11/16/2017     Lab Results   Component Value Date    GLUCOSE 156 (H) 11/16/2017    CALCIUM 11 1 (H) 11/16/2017     11/16/2017    K 4 8 11/16/2017    CO2 14 (L) 11/16/2017     (H) 11/16/2017    BUN 47 (H) 11/16/2017    CREATININE 2 09 (H) 11/16/2017     Lab Results   Component Value Date    CALCIUM 11 1 (H) 11/16/2017    PHOS 4 3 09/15/2017

## 2017-11-16 NOTE — ED ATTENDING ATTESTATION
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

## 2017-11-16 NOTE — ED PROVIDER NOTES
History  Chief Complaint   Patient presents with    Vomiting     Pt reports abd pain  Family reports abd pain has been getting worse and pt lives alone and cant be trusted to take meds etc      69-year-old female, status post renal pancreas transplant  20 years ago with recent pancreatic failure and CHF, presents to the ED for abd pain x 4 days  Patient is a poor historian and history is obtained from patient's father  Father states that patient lives at home by herself  He states that she has not been taking care of herself and has not been taking her medications  He also notes that she has been c/o periumbilical and suprapubic abd pain for the last 4 days, which is similar to her past abd pain from UTI  Patient was recently admitted to the hospital a few weeks ago for same and is suppose to take bactrim every other day but has not been taking it  Patient also states that she has been nauseous with about 2 episodes of vomiting/day as well  She denies any other complaints  Father reports that she seemed more confused today than usual and is concerned that she may be depressed  Patient denies any SI/HI but does state that she is depressed  She denies any F/C, D/C, CP, SOB, or urinary symptoms           History provided by:  Patient and parent  Abdominal Pain   Pain location:  Periumbilical and suprapubic  Pain quality: burning    Pain radiates to:  Does not radiate  Pain severity:  Moderate  Onset quality:  Gradual  Duration:  4 days  Timing:  Constant  Progression:  Unchanged  Chronicity:  New  Context: previous surgery    Relieved by:  Nothing  Worsened by:  Nothing  Ineffective treatments:  None tried  Associated symptoms: nausea and vomiting    Associated symptoms: no chest pain, no chills, no constipation, no cough, no diarrhea, no dysuria, no fever, no hematuria, no shortness of breath and no sore throat    Risk factors: multiple surgeries and recent hospitalization        Prior to Admission Medications Prescriptions Last Dose Informant Patient Reported? Taking? ARIPiprazole (ABILIFY) 20 MG tablet   Yes Yes   Sig: Take 20 mg by mouth daily  Cholecalciferol (VITAMIN D3) 3000 UNITS TABS   Yes Yes   Sig: Take 1 tablet by mouth daily  DULoxetine (CYMBALTA) 60 mg delayed release capsule   Yes Yes   Sig: Take 60 mg by mouth 2 (two) times a day  Insulin Glargine (TOUJEO SOLOSTAR) 300 UNIT/ML SOPN   No Yes   Sig: Inject 10 Units under the skin daily   Patient taking differently: Inject 20 Units under the skin daily     LORazepam (ATIVAN) 2 mg tablet   Yes Yes   Sig: Take 2 mg by mouth 2 (two) times a day as needed for anxiety     Lactulose Encephalopathy (GENERLAC PO)   Yes Yes   Sig: Take 30 mL by mouth daily  Levothyroxine Sodium 88 MCG CAPS   Yes Yes   Sig: Take 88 mcg by mouth daily     Tacrolimus (PROGRAF PO)   Yes Yes   Sig: Take 3 mg by mouth daily PM   Zolpidem Tartrate (AMBIEN PO)   Yes Yes   Sig: Take by mouth   acetaminophen (TYLENOL) 325 mg tablet   No Yes   Sig: Take 2 tablets (650 mg total) by mouth every 6 (six) hours as needed for mild pain or fever  amLODIPine (NORVASC) 5 mg tablet   Yes Yes   Sig: Take 10 mg by mouth daily AM    aspirin 81 MG tablet   Yes Yes   Sig: Take 81 mg by mouth daily  cloNIDine (CATAPRES) 0 2 mg tablet   Yes Yes   Sig: Take 0 2 mg by mouth daily noon   cloNIDine (CATAPRES) 0 3 mg tablet   Yes Yes   Sig: Take 0 3 mg by mouth 2 (two) times a day Am-PM    doxazosin (CARDURA) 2 mg tablet   No Yes   Sig: Take 1 tablet by mouth daily at bedtime   folic acid (FOLVITE) 1 mg tablet   Yes Yes   Sig: Take 1 mg by mouth 2 (two) times a day  furosemide (LASIX) 40 mg tablet  Self Yes Yes   Sig: Take 20 mg by mouth daily as needed     hydrALAZINE (APRESOLINE) 50 mg tablet   Yes Yes   Sig: Take 50 mg by mouth 3 (three) times a day     insulin lispro (HumaLOG) 100 units/mL injection   Yes Yes   Sig: Inject 1-5 Units under the skin 3 (three) times a day before meals metoprolol tartrate (LOPRESSOR) 50 mg tablet   Yes Yes   Sig: Take 50 mg by mouth 2 (two) times a day  pantoprazole (PROTONIX) 40 mg tablet   Yes Yes   Sig: Take 40 mg by mouth daily  pravastatin (PRAVACHOL) 80 mg tablet   Yes Yes   Sig: Take 80 mg by mouth daily  predniSONE 5 mg tablet   Yes Yes   Sig: Take 5 mg by mouth daily     rOPINIRole (REQUIP) 0 25 mg tablet   Yes Yes   Sig: Take 0 25 mg by mouth 2 (two) times a day  sertraline (ZOLOFT) 100 mg tablet   Yes Yes   Sig: Take 100 mg by mouth 2 (two) times a day  sodium bicarbonate 650 mg tablet   Yes Yes   Sig: Take 650 mg by mouth 4 (four) times a day     sulfamethoxazole-trimethoprim (BACTRIM) 400-80 mg per tablet   No Yes   Sig: Take 1 tablet by mouth every other day for 30 days   tacrolimus (PROGRAF) 1 mg capsule   Yes Yes   Sig: Take 4 mg by mouth daily AM    traZODone (DESYREL) 150 mg tablet   Yes Yes   Sig: Take 150 mg by mouth daily at bedtime  Facility-Administered Medications: None       Past Medical History:   Diagnosis Date    Anemia     Cancer ([facility] )     Multiple myeloma    Chronic diastolic (congestive) heart failure 9/18/2017    Diabetes mellitus ([place] [address] )     Previous, controlled with diet    Disease of thyroid gland     History of transfusion     Hypertension     Night blindness     Psychiatric disorder     Renal disorder     Retinopathy     Status post simultaneous kidney and pancreas transplant ([facility] )     Toe amputation status ([place] [address] )        Past Surgical History:   Procedure Laterality Date    CHOLECYSTECTOMY      COMBINED KIDNEY-PANCREAS TRANSPLANT N/A     CYSTOSCOPY N/A 10/13/2016    Procedure: CYSTOSCOPY, retrograde pyelogram, biopsy of ureteral polyp;   Surgeon: Ricky Woodard MD;  Location: [facility];  Service:    [facility] EYE SURGERY      cataracts    FOOT AMPUTATION THROUGH METATARSAL Left     HALLUX VALGUS CORRECTION Right     NEPHRECTOMY TRANSPLANTED ORGAN         Family History   Problem Relation Age of Onset    Hypertension Mother     Hypertension Father     Cancer Maternal Grandfather     Cancer Paternal Grandmother     Cancer Paternal Grandfather      I have reviewed and agree with the history as documented  Social History   Substance Use Topics    Smoking status: Former Smoker     Quit date: 4/28/2012    Smokeless tobacco: Former User    Alcohol use No        Review of Systems   Constitutional: Negative for chills and fever  HENT: Negative for congestion, ear pain and sore throat  Eyes: Negative for pain and visual disturbance  Respiratory: Negative for cough, shortness of breath and wheezing  Cardiovascular: Negative for chest pain and leg swelling  Gastrointestinal: Positive for abdominal pain, nausea and vomiting  Negative for constipation and diarrhea  Genitourinary: Negative for dysuria, frequency, hematuria and urgency  Musculoskeletal: Negative for neck pain and neck stiffness  Skin: Negative for rash and wound  Neurological: Negative for weakness, numbness and headaches  Psychiatric/Behavioral: Negative for agitation and confusion  All other systems reviewed and are negative  Physical Exam  ED Triage Vitals   Temperature Pulse Respirations Blood Pressure SpO2   11/16/17 1128 11/16/17 1125 11/16/17 1125 11/16/17 1125 11/16/17 1125   97 6 °F (36 4 °C) 77 18 134/63 97 %      Temp Source Heart Rate Source Patient Position - Orthostatic VS BP Location FiO2 (%)   11/16/17 1128 11/16/17 1125 11/16/17 1125 11/16/17 1125 --   Oral Monitor Sitting Left arm       Pain Score       11/16/17 1125       8           Orthostatic Vital Signs  Vitals:    11/16/17 2146 11/16/17 2350 11/17/17 0742 11/17/17 1558   BP: 148/54 144/60 149/61 132/55   Pulse: 80 88 90 63   Patient Position - Orthostatic VS: Lying Lying  Lying       Physical Exam   Constitutional: She is oriented to person, place, and time  She appears well-developed and well-nourished     HENT:   Head: Normocephalic and atraumatic  Mouth/Throat: Oropharynx is clear and moist    Eyes: EOM are normal  Pupils are equal, round, and reactive to light  Neck: Normal range of motion  Neck supple  Cardiovascular: Normal rate and regular rhythm  Pulmonary/Chest: Effort normal and breath sounds normal    Abdominal: Soft  Bowel sounds are normal  She exhibits no distension  There is tenderness  Diffuse abdominal tenderness, worse in the periumbilical and suprapubic area   Musculoskeletal: Normal range of motion  She exhibits no edema, tenderness or deformity  Neurological: She is alert and oriented to person, place, and time  No focal deficits   Skin: Skin is warm and dry  Nursing note and vitals reviewed        ED Medications  Medications   sodium bicarbonate 150 mEq in dextrose 5 % 1,000 mL infusion (125 mL/hr Intravenous New Bag 11/17/17 1350)   acetaminophen (TYLENOL) tablet 650 mg (650 mg Oral Given 11/17/17 0512)   amLODIPine (NORVASC) tablet 10 mg (10 mg Oral Given 11/17/17 0957)   ARIPiprazole (ABILIFY) tablet 20 mg (20 mg Oral Given 11/17/17 0957)   aspirin chewable tablet 81 mg (81 mg Oral Given 11/17/17 0956)   cloNIDine (CATAPRES) tablet 0 2 mg (0 2 mg Oral Given 11/17/17 1154)   cloNIDine (CATAPRES) tablet 0 3 mg (0 3 mg Oral Given 11/17/17 0957)   doxazosin (CARDURA) tablet 2 mg (2 mg Oral Given 11/16/17 2349)   DULoxetine (CYMBALTA) delayed release capsule 60 mg (60 mg Oral Given 00/30/06 8093)   folic acid (FOLVITE) tablet 1 mg (1 mg Oral Given 11/17/17 0956)   hydrALAZINE (APRESOLINE) tablet 50 mg (50 mg Oral Given 11/17/17 0956)   insulin glargine (LANTUS) subcutaneous injection 20 Units (20 Units Subcutaneous Given 11/17/17 0753)   levothyroxine tablet 88 mcg (88 mcg Oral Given 11/17/17 0538)   LORazepam (ATIVAN) tablet 2 mg (not administered)   metoprolol tartrate (LOPRESSOR) tablet 50 mg (50 mg Oral Given 11/17/17 0956)   pantoprazole (PROTONIX) EC tablet 40 mg (40 mg Oral Given 11/17/17 0601)   pravastatin (PRAVACHOL) tablet 80 mg (80 mg Oral Given 11/17/17 0956)   predniSONE tablet 5 mg (5 mg Oral Given 11/17/17 0957)   rOPINIRole (REQUIP) tablet 0 25 mg (0 25 mg Oral Given 11/17/17 0956)   sertraline (ZOLOFT) tablet 100 mg (100 mg Oral Given 11/17/17 0957)   tacrolimus (PROGRAF) capsule 3 mg (3 mg Oral Given 11/16/17 2238)   tacrolimus (PROGRAF) capsule 4 mg (4 mg Oral Given 11/17/17 0957)   traZODone (DESYREL) tablet 150 mg (150 mg Oral Given 11/16/17 2238)   zolpidem (AMBIEN) tablet 5 mg (not administered)   ondansetron (ZOFRAN) injection 4 mg (4 mg Intravenous Given 11/17/17 0957)   senna (SENOKOT) tablet 8 6 mg (not administered)   heparin (porcine) subcutaneous injection 5,000 Units (5,000 Units Subcutaneous Given 11/17/17 1350)   insulin lispro (HumaLOG) 100 units/mL subcutaneous injection 1-6 Units (1 Units Subcutaneous Given 11/17/17 1154)   insulin lispro (HumaLOG) 100 units/mL subcutaneous injection 1-5 Units (1 Units Subcutaneous Not Given 11/16/17 2247)   ceFAZolin (ANCEF) IVPB (premix) 1,000 mg (1,000 mg Intravenous New Bag 11/17/17 1005)   fentanyl citrate (PF) 100 MCG/2ML 25 mcg (25 mcg Intravenous Given 11/16/17 1438)   fentanyl citrate (PF) 100 MCG/2ML 25 mcg (25 mcg Intravenous Given 11/16/17 1843)       Diagnostic Studies  Results Reviewed     Procedure Component Value Units Date/Time    Tacrolimus level [[id number]] Collected:  11/17/17 1415    Lab Status:   In process Specimen:  Blood from Arm, Left Updated:  11/17/17 1421    Urine culture [[id number]]  (Abnormal) Collected:  11/16/17 1803    Lab Status:  Preliminary result Specimen:  Urine from Urine, Clean Catch Updated:  11/17/17 1231     Urine Culture >100,000 cfu/ml Gram Negative Jerald resembling Escherichia coli (A)    Phosphorus [[id number]]  (Normal) Collected:  11/17/17 0519    Lab Status:  Final result Specimen:  Blood from Hand, Left Updated:  11/17/17 0701     Phosphorus 3 8 mg/dL     Basic metabolic panel [[id number]]  (Abnormal) Collected: 11/17/17 0519    Lab Status:  Final result Specimen:  Blood from Hand, Left Updated:  11/17/17 0701     Sodium 137 mmol/L      Potassium 3 9 mmol/L      Chloride 108 mmol/L      CO2 18 (L) mmol/L      Anion Gap 11 mmol/L      BUN 52 (H) mg/dL      Creatinine 2 62 (H) mg/dL      Glucose 126 mg/dL      Calcium 10 0 mg/dL      eGFR 20 ml/min/1 73sq m     Narrative:         National Kidney Disease Education Program recommendations are as follows:  GFR calculation is accurate only with a steady state creatinine  Chronic Kidney disease less than 60 ml/min/1 73 sq  meters  Kidney failure less than 15 ml/min/1 73 sq  meters  CBC and differential [[id number]]  (Abnormal) Collected:  11/17/17 0519    Lab Status:  Final result Specimen:  Blood from Hand, Left Updated:  11/17/17 0658     WBC 8 42 Thousand/uL      RBC 4 40 Million/uL      Hemoglobin 12 9 g/dL      Hematocrit 38 9 %      MCV 88 fL      MCH 29 3 pg      MCHC 33 2 g/dL      RDW 17 7 (H) %      MPV 12 4 fL      Platelets 615 Thousands/uL      nRBC 0 /100 WBCs      Neutrophils Relative 70 %      Lymphocytes Relative 20 %      Monocytes Relative 7 %      Eosinophils Relative 2 %      Basophils Relative 1 %      Neutrophils Absolute 5 90 Thousands/µL      Lymphocytes Absolute 1 68 Thousands/µL      Monocytes Absolute 0 62 Thousand/µL      Eosinophils Absolute 0 14 Thousand/µL      Basophils Absolute 0 04 Thousands/µL     Calcium, ionized [[id number]]  (Abnormal) Collected:  11/17/17 0510    Lab Status:  Final result Specimen:  Blood from Arm, Left Updated:  11/17/17 0640     Calcium, Ionized 1 36 (H) mmol/L     Lactic acid, plasma [[id number]]  (Normal) Collected:  11/16/17 2043    Lab Status:  Final result Specimen:  Blood from Arm, Left Updated:  11/16/17 2128     LACTIC ACID 0 5 mmol/L     Narrative:         Result may be elevated if tourniquet was used during collection      Acetone [[id number]]  (Normal) Collected:  11/16/17 2043    Lab Status:  Final result Specimen: Blood from Arm, Left Updated:  11/16/17 2058     Acetone, Bld Negative    POCT urinalysis dipstick [[id number]]  (Normal) Resulted:  11/16/17 2052    Lab Status:  Final result Specimen:  Urine Updated:  11/16/17 2052     Clarity, UA cloudy    PTH-related peptide [[id number]] Collected:  11/16/17 2043    Lab Status: In process Specimen:  Blood from Arm, Left Updated:  11/16/17 2048    Blood culture [[id number]] Collected:  11/16/17 2043    Lab Status: In process Specimen:  Blood from Arm, Left Updated:  11/16/17 2047    Urine Microscopic [[id number]]  (Abnormal) Collected:  11/16/17 1803    Lab Status:  Final result Specimen:  Urine from Urine, Clean Catch Updated:  11/16/17 1759     RBC, UA None Seen /hpf      WBC, UA Innumerable (A) /hpf      Epithelial Cells Occasional /hpf      Bacteria, UA Innumerable (A) /hpf     Vitamin D 25 hydroxy [[id number]]  (Normal) Collected:  11/16/17 1138    Lab Status:  Final result Specimen:  Blood from Arm, Left Updated:  11/16/17 1745     Vit D, 25-Hydroxy 43 9 ng/mL     Narrative: This assay is a certified procedure of the CDC Vitamin D Standardization Certification Program (VDSCP)     Deficiency <20ng/ml   Insufficiency 20-30ng/ml   Sufficient  ng/ml     *Patients undergoing fluorescein dye angiography may retain small amounts of fluorescein in the body for 48-72 hours post procedure  Samples containing fluorescein can produce falsely elevated Vitamin D values  If the patient had this procedure, a specimen should be resubmitted post fluorescein clearance  Vitamin D 1,25 dihydroxy [[id number]] Collected:  11/16/17 1138    Lab Status:   In process Specimen:  Blood from Arm, Left Updated:  11/16/17 1722    PTH, intact [[id number]]     Lab Status:  No result Specimen:  Blood     ED Urine Macroscopic [[id number]]  (Abnormal) Collected:  11/16/17 1803    Lab Status:  Final result Specimen:  Urine Updated:  11/16/17 1656     Color, UA Yellow     Clarity, UA Cloudy     pH, UA 7 0 Leukocytes, UA Large (A)     Nitrite, UA Positive (A)     Protein,  (2+) (A) mg/dl      Glucose, UA Negative mg/dl      Ketones, UA Negative mg/dl      Urobilinogen, UA 0 2 E U /dl      Bilirubin, UA Negative     Blood, UA Trace (A)     Specific [place], UA 1 020    Narrative:       CLINITEK RESULT    Comprehensive metabolic panel [[id number]]  (Abnormal) Collected:  11/16/17 1138    Lab Status:  Final result Specimen:  Blood from Arm, Left Updated:  11/16/17 1207     Sodium 137 mmol/L      Potassium 4 8 mmol/L      Chloride 114 (H) mmol/L      CO2 14 (L) mmol/L      Anion Gap 9 mmol/L      BUN 47 (H) mg/dL      Creatinine 2 09 (H) mg/dL      Glucose 156 (H) mg/dL      Calcium 11 1 (H) mg/dL      AST 22 U/L      ALT 25 U/L      Alkaline Phosphatase 127 (H) U/L      Total Protein 10 5 (H) g/dL      Albumin 3 2 (L) g/dL      Total Bilirubin 0 28 mg/dL      eGFR 26 ml/min/1 73sq m     Narrative:         National Kidney Disease Education Program recommendations are as follows:  GFR calculation is accurate only with a steady state creatinine  Chronic Kidney disease less than 60 ml/min/1 73 sq  meters  Kidney failure less than 15 ml/min/1 73 sq  meters      Lipase [[id number]]  (Normal) Collected:  11/16/17 1138    Lab Status:  Final result Specimen:  Blood from Arm, Left Updated:  11/16/17 1207     Lipase 147 u/L     CBC and differential [[id number]]  (Abnormal) Collected:  11/16/17 1138    Lab Status:  Final result Specimen:  Blood from Arm, Left Updated:  11/16/17 1148     WBC 9 59 Thousand/uL      RBC 5 10 Million/uL      Hemoglobin 15 1 g/dL      Hematocrit 45 9 %      MCV 90 fL      MCH 29 6 pg      MCHC 32 9 g/dL      RDW 17 2 (H) %      MPV 11 6 fL      Platelets 277 Thousands/uL      nRBC 0 /100 WBCs      Neutrophils Relative 79 (H) %      Lymphocytes Relative 14 %      Monocytes Relative 5 %      Eosinophils Relative 1 %      Basophils Relative 1 %      Neutrophils Absolute 7 67 (H) Thousands/µL      Lymphocytes Absolute 1 29 Thousands/µL      Monocytes Absolute 0 49 Thousand/µL      Eosinophils Absolute 0 06 Thousand/µL      Basophils Absolute 0 05 Thousands/µL                  CT abdomen pelvis wo contrast   Final Result by Demetrio Valdivia MD (11/16 1630)      No significant interval change from prior CT June 9, 2017 except for nodular subcentimeter densities in the second/third portion of the duodenum which could be from ingested material, correlate clinically  Atrophic kidneys and pelvic transplants are unchanged  Workstation performed: [id number]               Procedures  Procedures      Phone Consults  ED Phone Contact    ED Course  ED Course as of Nov 17 1604   Thu Nov 16, 2017   [address] with Dr Frandy Price from nephrology, agrees with plan  Recommends getting a Tacrolimus level at 8am tomorrow morning and a renal US now  Will have someone come down to see patinet in ED    [address] [facility] PA saw patient- recommends bicarb gtt and admission for obs and likely placement    1638 Creatinine: (!) 2 09   1638 CO2: (!) 14   1652 Spoke with Dr Alena Scott, who saw the patient, [facility]  Requests that ID be consulted for management of presumed UTI in the setting of frequent UTIs and being immunocompromised  Will consult ID now      1659 Nitrite, UA: (!) Positive   56 Spoke with Dr Eduar Lopez, made aware of patient and findings, will see patient in consultation  Did not give any recommendations for antibiotics at this time  [address] with Dr Jaci Champagne from [place],  made aware of patient and findings  Recommends patient be placed on Ancef overnight until ID is able to see patient and determine further antibiotic coverage      2114 US guided 20G peripheral IV placed by me in R antecubital area               NIH Stroke Scale    Flowsheet Row Most Recent Value   Level of Consciousness (1a )  0 Filed at: 11/16/2017 2300   LOC Questions (1b )  0 Filed at: 11/16/2017 2300   LOC Commands (1c )  0 Filed at: 11/16/2017 2300   Best Gaze (2 )  0 Filed at: 11/16/2017 2300   Visual (3 )  0 Filed at: 11/16/2017 2300   Facial Palsy (4 )  0 Filed at: 11/16/2017 2300   Motor Arm, Left (5a )  0 Filed at: 11/16/2017 2300   Motor Arm, Right (5b )  0 Filed at: 11/16/2017 2300   Motor Leg, Left (6a )  0 Filed at: 11/16/2017 2300   Motor Leg, Right (6b )  0 Filed at: 11/16/2017 2300   Limb Ataxia (7 )  1 Filed at: 11/16/2017 2300   Sensory (8 )  0 Filed at: 11/16/2017 2300   Best Language (9 )  0 Filed at: 11/16/2017 2300   Dysarthria (10 )  0 Filed at: 11/16/2017 2300   Extinction and Inattention (11 ) (Formerly Neglect)  0 Filed at: 11/16/2017 2300   Total  1 Filed at: 11/16/2017 2300                        MDM  Number of Diagnoses or Management Options  Acute kidney injury ([address] ): new and requires workup  Confusion: new and requires workup  Hypercalcemia: new and requires workup  Low bicarbonate: new and requires workup  UTI (urinary tract infection): new and requires workup  Diagnosis management comments: Patient with abd pain, hx of renal/ pancreas transplant, and is non-compliant with meds  Will get labs, UA to r/o  UTI, and CT scan to rule out intra-abdominal etiology of pain  Patient reevaluated and feels improved  Patient updated on results of tests and plan of care including admission to hospital for further evaluation of presenting complaint  Patient demonstrates verbal understanding and agrees with plan  Report to Dr Ona Castleman  with [facility] for continuation of patient care          Amount and/or Complexity of Data Reviewed  Clinical lab tests: ordered and reviewed  Tests in the radiology section of CPT®: ordered and reviewed  Tests in the medicine section of CPT®: ordered and reviewed  Discussion of test results with the performing providers: yes  Decide to obtain previous medical records or to obtain history from someone other than the patient: yes  Obtain history from someone other than the patient: yes  Review and summarize past medical records: yes  Discuss the patient with other providers: yes  Independent visualization of images, tracings, or specimens: yes    Patient Progress  Patient progress: improved    CritCare Time    Disposition  Final diagnoses:   UTI (urinary tract infection)   Confusion   Low bicarbonate   Hypercalcemia   Acute kidney injury ([facility] )     Time reflects when diagnosis was documented in both MDM as applicable and the Disposition within this note     Time User Action Codes Description Comment    11/16/2017  5:02 PM [address] [facility] A Add [N39 0] UTI (urinary tract infection)     11/16/2017  5:02 PM [address] [facility] A Add [R41 0] Confusion     11/16/2017  5:02 PM 1400 Reginald Stambaugh, [address] Add [E87 8] Low bicarbonate     11/16/2017  5:02 PM [address] [facility] A Add [E83 52] Hypercalcemia     11/16/2017  5:03 PM [address] [facility] A Add [N18 9] Chronic kidney disease     11/16/2017  5:03 PM [facility] A Remove [N18 9] Chronic kidney disease     [facility]16[facility]  [facility]17 9] Acute kidney injury ([facility] )     11/16/2017  6:53 PM Htuy, Alpiniano B Modify [E83 52] Hypercalcemia     11/16/2017  6:53 PM Thuy, Alpiniano B Modify [N17 9] Acute kidney injury ([facility] )     11/16/2017  6:53 PM Thuy, Alpiniano B Add [Z94 0] Renal transplant recipient     11/16/2017  6:53 PM Thuy, Alpiniano B Modify [Z94 0] Renal transplant recipient     11/16/2017  6:54 PM Thuy, Alpiniano B Add [D89 9] Immunosuppression ([facility] )     11/16/2017  6:54 PM Thuy, Alpiniano B Modify [D89 9] Immunosuppression ([facility] )     11/16/2017  7:03 PM Thuy, Alpiniano B Add [N21 0] Calculus, bladder, diverticulum     11/16/2017  7:03 PM Thuy, Alpiniano B Modify [N21 0] Calculus, bladder, diverticulum     11/16/2017  7:07 PM Adilia Daley Add [R11 2] Nausea and vomiting     11/16/2017  7:07 PM Adilia Daley Modify [R11 2] Nausea and vomiting     11/16/2017  7:07 PM Adilia Daley Add [R12] Heartburn     11/16/2017  7:07 PM Adilia Daley Modify [R12] Heartburn     11/16/2017  7:07 PM Adilia Daley Add [R10 13] Epigastric pain       ED Disposition     ED Disposition Condition Comment    Admit  Case was discussed with AJAY and the patient's admission status was agreed to be Admission Status: inpatient status to the service of Dr Gavin Rogers   Follow-up Information    None       Current Discharge Medication List      CONTINUE these medications which have NOT CHANGED    Details   acetaminophen (TYLENOL) 325 mg tablet Take 2 tablets (650 mg total) by mouth every 6 (six) hours as needed for mild pain or fever  Qty: 30 tablet, Refills: 0      amLODIPine (NORVASC) 5 mg tablet Take 10 mg by mouth daily AM       ARIPiprazole (ABILIFY) 20 MG tablet Take 20 mg by mouth daily  aspirin 81 MG tablet Take 81 mg by mouth daily  Cholecalciferol (VITAMIN D3) 3000 UNITS TABS Take 1 tablet by mouth daily  !! cloNIDine (CATAPRES) 0 2 mg tablet Take 0 2 mg by mouth daily noon      !! cloNIDine (CATAPRES) 0 3 mg tablet Take 0 3 mg by mouth 2 (two) times a day Am-PM       doxazosin (CARDURA) 2 mg tablet Take 1 tablet by mouth daily at bedtime  Qty: 30 tablet, Refills: 0      DULoxetine (CYMBALTA) 60 mg delayed release capsule Take 60 mg by mouth 2 (two) times a day  folic acid (FOLVITE) 1 mg tablet Take 1 mg by mouth 2 (two) times a day  furosemide (LASIX) 40 mg tablet Take 20 mg by mouth daily as needed        hydrALAZINE (APRESOLINE) 50 mg tablet Take 50 mg by mouth 3 (three) times a day  Insulin Glargine (TOUJEO SOLOSTAR) 300 UNIT/ML SOPN Inject 10 Units under the skin daily  Refills: 0      insulin lispro (HumaLOG) 100 units/mL injection Inject 1-5 Units under the skin 3 (three) times a day before meals      Lactulose Encephalopathy (GENERLAC PO) Take 30 mL by mouth daily        Levothyroxine Sodium 88 MCG CAPS Take 88 mcg by mouth daily        LORazepam (ATIVAN) 2 mg tablet Take 2 mg by mouth 2 (two) times a day as needed for anxiety        metoprolol tartrate (LOPRESSOR) 50 mg tablet Take 50 mg by mouth 2 (two) times a day  pantoprazole (PROTONIX) 40 mg tablet Take 40 mg by mouth daily  pravastatin (PRAVACHOL) 80 mg tablet Take 80 mg by mouth daily  predniSONE 5 mg tablet Take 5 mg by mouth daily        rOPINIRole (REQUIP) 0 25 mg tablet Take 0 25 mg by mouth 2 (two) times a day  sertraline (ZOLOFT) 100 mg tablet Take 100 mg by mouth 2 (two) times a day  sodium bicarbonate 650 mg tablet Take 650 mg by mouth 4 (four) times a day        sulfamethoxazole-trimethoprim (BACTRIM) 400-80 mg per tablet Take 1 tablet by mouth every other day for 30 days  Qty: 40 tablet, Refills: 0      !! Tacrolimus (PROGRAF PO) Take 3 mg by mouth daily PM      !! tacrolimus (PROGRAF) 1 mg capsule Take 4 mg by mouth daily AM       traZODone (DESYREL) 150 mg tablet Take 150 mg by mouth daily at bedtime  Zolpidem Tartrate (AMBIEN PO) Take by mouth       !! - Potential duplicate medications found  Please discuss with provider  No discharge procedures on file  ED Provider  Attending physically available and evaluated Yao Howell I managed the patient along with the ED Attending      Electronically Signed by         Ame Arias DO  Resident  11/17/17 0538

## 2017-11-17 LAB
ANION GAP SERPL CALCULATED.3IONS-SCNC: 11 MMOL/L (ref 4–13)
BASOPHILS # BLD AUTO: 0.04 THOUSANDS/ΜL (ref 0–0.1)
BASOPHILS NFR BLD AUTO: 1 % (ref 0–1)
BUN SERPL-MCNC: 52 MG/DL (ref 5–25)
CA-I BLD-SCNC: 1.36 MMOL/L (ref 1.12–1.32)
CALCIUM SERPL-MCNC: 10 MG/DL (ref 8.3–10.1)
CHLORIDE SERPL-SCNC: 108 MMOL/L (ref 100–108)
CO2 SERPL-SCNC: 18 MMOL/L (ref 21–32)
CREAT SERPL-MCNC: 2.62 MG/DL (ref 0.6–1.3)
EOSINOPHIL # BLD AUTO: 0.14 THOUSAND/ΜL (ref 0–0.61)
EOSINOPHIL NFR BLD AUTO: 2 % (ref 0–6)
ERYTHROCYTE [DISTWIDTH] IN BLOOD BY AUTOMATED COUNT: 17.7 % (ref 11.6–15.1)
GFR SERPL CREATININE-BSD FRML MDRD: 20 ML/MIN/1.73SQ M
GLUCOSE SERPL-MCNC: 126 MG/DL (ref 65–140)
GLUCOSE SERPL-MCNC: 146 MG/DL (ref 65–140)
GLUCOSE SERPL-MCNC: 153 MG/DL (ref 65–140)
GLUCOSE SERPL-MCNC: 166 MG/DL (ref 65–140)
GLUCOSE SERPL-MCNC: 172 MG/DL (ref 65–140)
GLUCOSE SERPL-MCNC: 188 MG/DL (ref 65–140)
HCT VFR BLD AUTO: 38.9 % (ref 34.8–46.1)
HGB BLD-MCNC: 12.9 G/DL (ref 11.5–15.4)
LYMPHOCYTES # BLD AUTO: 1.68 THOUSANDS/ΜL (ref 0.6–4.47)
LYMPHOCYTES NFR BLD AUTO: 20 % (ref 14–44)
MCH RBC QN AUTO: 29.3 PG (ref 26.8–34.3)
MCHC RBC AUTO-ENTMCNC: 33.2 G/DL (ref 31.4–37.4)
MCV RBC AUTO: 88 FL (ref 82–98)
MONOCYTES # BLD AUTO: 0.62 THOUSAND/ΜL (ref 0.17–1.22)
MONOCYTES NFR BLD AUTO: 7 % (ref 4–12)
NEUTROPHILS # BLD AUTO: 5.9 THOUSANDS/ΜL (ref 1.85–7.62)
NEUTS SEG NFR BLD AUTO: 70 % (ref 43–75)
NRBC BLD AUTO-RTO: 0 /100 WBCS
PHOSPHATE SERPL-MCNC: 3.8 MG/DL (ref 2.7–4.5)
PLATELET # BLD AUTO: 180 THOUSANDS/UL (ref 149–390)
PLATELET # BLD AUTO: 204 THOUSANDS/UL (ref 149–390)
PMV BLD AUTO: 11.3 FL (ref 8.9–12.7)
PMV BLD AUTO: 12.4 FL (ref 8.9–12.7)
POTASSIUM SERPL-SCNC: 3.9 MMOL/L (ref 3.5–5.3)
RBC # BLD AUTO: 4.4 MILLION/UL (ref 3.81–5.12)
SODIUM SERPL-SCNC: 137 MMOL/L (ref 136–145)
WBC # BLD AUTO: 8.42 THOUSAND/UL (ref 4.31–10.16)

## 2017-11-17 PROCEDURE — 82330 ASSAY OF CALCIUM: CPT | Performed by: PHYSICIAN ASSISTANT

## 2017-11-17 PROCEDURE — 85049 AUTOMATED PLATELET COUNT: CPT | Performed by: INTERNAL MEDICINE

## 2017-11-17 PROCEDURE — 82948 REAGENT STRIP/BLOOD GLUCOSE: CPT

## 2017-11-17 PROCEDURE — 80048 BASIC METABOLIC PNL TOTAL CA: CPT | Performed by: PHYSICIAN ASSISTANT

## 2017-11-17 PROCEDURE — 84100 ASSAY OF PHOSPHORUS: CPT | Performed by: PHYSICIAN ASSISTANT

## 2017-11-17 PROCEDURE — 80197 ASSAY OF TACROLIMUS: CPT | Performed by: EMERGENCY MEDICINE

## 2017-11-17 PROCEDURE — 85025 COMPLETE CBC W/AUTO DIFF WBC: CPT | Performed by: INTERNAL MEDICINE

## 2017-11-17 RX ADMIN — DEXTROSE MONOHYDRATE 125 ML/HR: 50 INJECTION, SOLUTION INTRAVENOUS at 05:19

## 2017-11-17 RX ADMIN — ROPINIROLE 0.25 MG: 0.25 TABLET, FILM COATED ORAL at 09:56

## 2017-11-17 RX ADMIN — CLONIDINE HYDROCHLORIDE 0.2 MG: 0.1 TABLET ORAL at 11:54

## 2017-11-17 RX ADMIN — TACROLIMUS 3 MG: 1 CAPSULE ORAL at 21:53

## 2017-11-17 RX ADMIN — PANTOPRAZOLE SODIUM 40 MG: 40 TABLET, DELAYED RELEASE ORAL at 17:18

## 2017-11-17 RX ADMIN — DEXTROSE MONOHYDRATE 125 ML/HR: 50 INJECTION, SOLUTION INTRAVENOUS at 23:59

## 2017-11-17 RX ADMIN — HEPARIN SODIUM 5000 UNITS: 5000 INJECTION, SOLUTION INTRAVENOUS; SUBCUTANEOUS at 13:50

## 2017-11-17 RX ADMIN — INSULIN GLARGINE 20 UNITS: 100 INJECTION, SOLUTION SUBCUTANEOUS at 07:53

## 2017-11-17 RX ADMIN — PREDNISONE 5 MG: 5 TABLET ORAL at 09:57

## 2017-11-17 RX ADMIN — FOLIC ACID 1 MG: 1 TABLET ORAL at 09:56

## 2017-11-17 RX ADMIN — TACROLIMUS 4 MG: 1 CAPSULE ORAL at 09:57

## 2017-11-17 RX ADMIN — HEPARIN SODIUM 5000 UNITS: 5000 INJECTION, SOLUTION INTRAVENOUS; SUBCUTANEOUS at 21:55

## 2017-11-17 RX ADMIN — HEPARIN SODIUM 5000 UNITS: 5000 INJECTION, SOLUTION INTRAVENOUS; SUBCUTANEOUS at 05:38

## 2017-11-17 RX ADMIN — INSULIN LISPRO 1 UNITS: 100 INJECTION, SOLUTION INTRAVENOUS; SUBCUTANEOUS at 11:54

## 2017-11-17 RX ADMIN — AMLODIPINE BESYLATE 10 MG: 10 TABLET ORAL at 09:57

## 2017-11-17 RX ADMIN — DEXTROSE MONOHYDRATE 125 ML/HR: 50 INJECTION, SOLUTION INTRAVENOUS at 13:50

## 2017-11-17 RX ADMIN — ARIPIPRAZOLE 20 MG: 10 TABLET ORAL at 09:57

## 2017-11-17 RX ADMIN — METOPROLOL TARTRATE 50 MG: 50 TABLET ORAL at 17:19

## 2017-11-17 RX ADMIN — ONDANSETRON 4 MG: 2 INJECTION INTRAMUSCULAR; INTRAVENOUS at 09:57

## 2017-11-17 RX ADMIN — ROPINIROLE 0.25 MG: 0.25 TABLET, FILM COATED ORAL at 17:19

## 2017-11-17 RX ADMIN — DULOXETINE HYDROCHLORIDE 60 MG: 60 CAPSULE, DELAYED RELEASE ORAL at 09:56

## 2017-11-17 RX ADMIN — ONDANSETRON 4 MG: 2 INJECTION INTRAMUSCULAR; INTRAVENOUS at 00:19

## 2017-11-17 RX ADMIN — ASPIRIN 81 MG 81 MG: 81 TABLET ORAL at 09:56

## 2017-11-17 RX ADMIN — CLONIDINE HYDROCHLORIDE 0.3 MG: 0.1 TABLET ORAL at 17:18

## 2017-11-17 RX ADMIN — HYDRALAZINE HYDROCHLORIDE 50 MG: 50 TABLET, FILM COATED ORAL at 17:19

## 2017-11-17 RX ADMIN — PRAVASTATIN SODIUM 80 MG: 80 TABLET ORAL at 09:56

## 2017-11-17 RX ADMIN — CEFAZOLIN SODIUM 1000 MG: 1 SOLUTION INTRAVENOUS at 22:00

## 2017-11-17 RX ADMIN — HYDRALAZINE HYDROCHLORIDE 50 MG: 50 TABLET, FILM COATED ORAL at 09:56

## 2017-11-17 RX ADMIN — FOLIC ACID 1 MG: 1 TABLET ORAL at 17:20

## 2017-11-17 RX ADMIN — PANTOPRAZOLE SODIUM 40 MG: 40 TABLET, DELAYED RELEASE ORAL at 06:01

## 2017-11-17 RX ADMIN — ACETAMINOPHEN 650 MG: 325 TABLET, FILM COATED ORAL at 05:12

## 2017-11-17 RX ADMIN — INSULIN LISPRO 1 UNITS: 100 INJECTION, SOLUTION INTRAVENOUS; SUBCUTANEOUS at 17:21

## 2017-11-17 RX ADMIN — CEFAZOLIN SODIUM 1000 MG: 1 SOLUTION INTRAVENOUS at 10:05

## 2017-11-17 RX ADMIN — SERTRALINE HYDROCHLORIDE 100 MG: 100 TABLET ORAL at 17:19

## 2017-11-17 RX ADMIN — LEVOTHYROXINE SODIUM 88 MCG: 88 TABLET ORAL at 05:38

## 2017-11-17 RX ADMIN — METOPROLOL TARTRATE 50 MG: 50 TABLET ORAL at 09:56

## 2017-11-17 RX ADMIN — TRAZODONE HYDROCHLORIDE 150 MG: 100 TABLET ORAL at 21:53

## 2017-11-17 RX ADMIN — SENNOSIDES 8.6 MG: 8.6 TABLET, FILM COATED ORAL at 17:20

## 2017-11-17 RX ADMIN — DOXAZOSIN 2 MG: 2 TABLET ORAL at 21:53

## 2017-11-17 RX ADMIN — CLONIDINE HYDROCHLORIDE 0.3 MG: 0.1 TABLET ORAL at 09:57

## 2017-11-17 RX ADMIN — INSULIN LISPRO 1 UNITS: 100 INJECTION, SOLUTION INTRAVENOUS; SUBCUTANEOUS at 21:57

## 2017-11-17 RX ADMIN — DULOXETINE HYDROCHLORIDE 60 MG: 60 CAPSULE, DELAYED RELEASE ORAL at 17:19

## 2017-11-17 RX ADMIN — SERTRALINE HYDROCHLORIDE 100 MG: 100 TABLET ORAL at 09:57

## 2017-11-17 NOTE — CONSULTS
Consultation - GI   Chanelle Galdamez 47 y o  female MRN: [id number]  Unit/Bed#: [facility] 725-01 Encounter: [id number]      Assessment/Plan     Assessment/Plan:    1  Abdominal pain  BUN/creatinine- 52/2 62- pre renal etiology secondary to poor oral intake  Given her lack of bloody bowel movements doubt any GI bleed  CT abdomen and pelvis showed nodular subcentimeter densities in the 2/3 portion of duodenum  Hyperdense pills are identified in the gastric antrum  Colonoscopy was last colonoscopy was 8/13 which showed to be negative for rectal hyperplastic inflammatory polyps  Endoscopy was 2014 shows ulcerative reflux esophagitis  Patient will benefit from repeat endoscopy during this hospitalization  Continue clear liquids as tolerated  Patient on intravenous Protonix and Zofran for symptomatic treatment  History of Present Illness   Physician Requesting Consult: Joey Ca MD  Reason for Consult / Principal Problem:   Hx and PE limited by:   HPI: Chanelle Galdamez is a 47y o  year old female with past medical history of hypertension, CKD stage 3, recurrent UTIs, congestive heart failure, MGUS, major depressive disorder and anxiety, hypothyroidism, status post renal and pancreatic transplant (1998), type 1 diabetes mellitus with neuropathy, presented to the emergency room for abdominal pain  Abdominal pain is ongoing described as epigastric, periumbilical in nature, with no radiation  Pain started 4 days ago where she was having stomach pain and heartburn symptoms, described as constant 8/10  Abdominal pain associated with on and off nausea, several episodes of nonbloody vomiting, and anorexia secondary to pain  Last episode of vomiting was on Monday  Exacerbated by eating or drinking and not alleviated by anything  Patient does admit that she did not take some of her medications during this time which she was nauseated    However she did take her insulin and her blood sugars were running low when she checked  Patient does have a history of constipation improved with MiraLax  Her last bowel movement was 5 days ago  Patient denies any blood in the stool at that time  Patient also denies any exotic or out of the ordinary food intake  There is no sick contacts, travel history recently, or pets in her house  Patient is a previous smoker, does not drink alcohol or use illicit drugs  Patient does admit to increase fatigue, hospice patient weakness and some ambulatory dysfunction worsened than her baseline  She also recalls her urine being normal in color, however smelling bad  There is no dysuria, fevers, chills  Inpatient consult to gastroenterology  Performed by: Ehsan Goncalves  Authorized by: Giorgio Toney           Review of Systems    Historical Information   Past Medical History:   Diagnosis Date    Anemia     Cancer ([facility] )     Multiple myeloma    Chronic diastolic (congestive) heart failure 9/18/2017    Diabetes mellitus ([facility] )     Previous, controlled with diet    Disease of thyroid gland     History of transfusion     Hypertension     Night blindness     Psychiatric disorder     Renal disorder     Retinopathy     Status post simultaneous kidney and pancreas transplant ([place] [address] )     Toe amputation status ([place] [address] )      Past Surgical History:   Procedure Laterality Date    COMBINED KIDNEY-PANCREAS TRANSPLANT N/A     CYSTOSCOPY N/A 10/13/2016    Procedure: CYSTOSCOPY, retrograde pyelogram, biopsy of ureteral polyp;   Surgeon: Eloisa Solomon MD;  Location: [facility];  Service:    [facility] EYE SURGERY      cataracts    FOOT AMPUTATION THROUGH METATARSAL Left     HALLUX VALGUS CORRECTION Right     NEPHRECTOMY TRANSPLANTED ORGAN       Social History   History   Alcohol Use No     History   Drug Use No     History   Smoking Status    Former Smoker    Quit date: 4/28/2012   Smokeless Tobacco    Former User     Family History:   Family History   Problem Relation Age of Onset    Hypertension Mother     Hypertension Father     Cancer Maternal Grandfather     Cancer Paternal Grandmother     Cancer Paternal Grandfather        Meds/Allergies   current meds:   Current Facility-Administered Medications   Medication Dose Route Frequency    acetaminophen (TYLENOL) tablet 650 mg  650 mg Oral Q6H PRN    amLODIPine (NORVASC) tablet 10 mg  10 mg Oral Daily    ARIPiprazole (ABILIFY) tablet 20 mg  20 mg Oral Daily    aspirin chewable tablet 81 mg  81 mg Oral Daily    ceFAZolin (ANCEF) IVPB (premix) 1,000 mg  1,000 mg Intravenous Q12H    cloNIDine (CATAPRES) tablet 0 2 mg  0 2 mg Oral Q24H    cloNIDine (CATAPRES) tablet 0 3 mg  0 3 mg Oral BID    doxazosin (CARDURA) tablet 2 mg  2 mg Oral HS    DULoxetine (CYMBALTA) delayed release capsule 60 mg  60 mg Oral BID    folic acid (FOLVITE) tablet 1 mg  1 mg Oral BID    heparin (porcine) subcutaneous injection 5,000 Units  5,000 Units Subcutaneous Q8H [address]    hydrALAZINE (APRESOLINE) tablet 50 mg  50 mg Oral TID    insulin glargine (LANTUS) subcutaneous injection 20 Units  20 Units Subcutaneous Daily With Breakfast    insulin lispro (HumaLOG) 100 units/mL subcutaneous injection 1-5 Units  1-5 Units Subcutaneous HS    insulin lispro (HumaLOG) 100 units/mL subcutaneous injection 1-6 Units  1-6 Units Subcutaneous TID AC    levothyroxine tablet 88 mcg  88 mcg Oral Early Morning    LORazepam (ATIVAN) tablet 2 mg  2 mg Oral BID PRN    metoprolol tartrate (LOPRESSOR) tablet 50 mg  50 mg Oral BID    ondansetron (ZOFRAN) injection 4 mg  4 mg Intravenous Q6H PRN    pantoprazole (PROTONIX) EC tablet 40 mg  40 mg Oral BID AC    pravastatin (PRAVACHOL) tablet 80 mg  80 mg Oral Daily    predniSONE tablet 5 mg  5 mg Oral Daily    rOPINIRole (REQUIP) tablet 0 25 mg  0 25 mg Oral BID    senna (SENOKOT) tablet 8 6 mg  1 tablet Oral HS PRN    sertraline (ZOLOFT) tablet 100 mg  100 mg Oral BID    sodium bicarbonate 150 mEq in dextrose 5 % 1,000 mL infusion  125 mL/hr Intravenous Continuous    tacrolimus (PROGRAF) capsule 3 mg  3 mg Oral HS    tacrolimus (PROGRAF) capsule 4 mg  4 mg Oral Daily    traZODone (DESYREL) tablet 150 mg  150 mg Oral HS    zolpidem (AMBIEN) tablet 5 mg  5 mg Oral HS PRN    and PTA meds:   Prior to Admission Medications   Prescriptions Last Dose Informant Patient Reported? Taking? ARIPiprazole (ABILIFY) 20 MG tablet   Yes Yes   Sig: Take 20 mg by mouth daily  Cholecalciferol (VITAMIN D3) 3000 UNITS TABS   Yes Yes   Sig: Take 1 tablet by mouth daily  DULoxetine (CYMBALTA) 60 mg delayed release capsule   Yes Yes   Sig: Take 60 mg by mouth 2 (two) times a day  Insulin Glargine (TOUJEO SOLOSTAR) 300 UNIT/ML SOPN   No Yes   Sig: Inject 10 Units under the skin daily   Patient taking differently: Inject 20 Units under the skin daily     LORazepam (ATIVAN) 2 mg tablet   Yes Yes   Sig: Take 2 mg by mouth 2 (two) times a day as needed for anxiety     Lactulose Encephalopathy (GENERLAC PO)   Yes Yes   Sig: Take 30 mL by mouth daily  Levothyroxine Sodium 88 MCG CAPS   Yes Yes   Sig: Take 88 mcg by mouth daily     Tacrolimus (PROGRAF PO)   Yes Yes   Sig: Take 3 mg by mouth daily PM   Zolpidem Tartrate (AMBIEN PO)   Yes Yes   Sig: Take by mouth   acetaminophen (TYLENOL) 325 mg tablet   No Yes   Sig: Take 2 tablets (650 mg total) by mouth every 6 (six) hours as needed for mild pain or fever  amLODIPine (NORVASC) 5 mg tablet   Yes Yes   Sig: Take 10 mg by mouth daily AM    aspirin 81 MG tablet   Yes Yes   Sig: Take 81 mg by mouth daily  cloNIDine (CATAPRES) 0 2 mg tablet   Yes Yes   Sig: Take 0 2 mg by mouth daily noon   cloNIDine (CATAPRES) 0 3 mg tablet   Yes Yes   Sig: Take 0 3 mg by mouth 2 (two) times a day Am-PM    doxazosin (CARDURA) 2 mg tablet   No Yes   Sig: Take 1 tablet by mouth daily at bedtime   folic acid (FOLVITE) 1 mg tablet   Yes Yes   Sig: Take 1 mg by mouth 2 (two) times a day       furosemide (LASIX) 40 mg tablet  Self Yes Yes   Sig: Take 20 mg by mouth daily as needed     hydrALAZINE (APRESOLINE) 50 mg tablet   Yes Yes   Sig: Take 50 mg by mouth 3 (three) times a day  insulin lispro (HumaLOG) 100 units/mL injection   Yes Yes   Sig: Inject 1-5 Units under the skin 3 (three) times a day before meals   metoprolol tartrate (LOPRESSOR) 50 mg tablet   Yes Yes   Sig: Take 50 mg by mouth 2 (two) times a day  pantoprazole (PROTONIX) 40 mg tablet   Yes Yes   Sig: Take 40 mg by mouth daily  pravastatin (PRAVACHOL) 80 mg tablet   Yes Yes   Sig: Take 80 mg by mouth daily  predniSONE 5 mg tablet   Yes Yes   Sig: Take 5 mg by mouth daily     rOPINIRole (REQUIP) 0 25 mg tablet   Yes Yes   Sig: Take 0 25 mg by mouth 2 (two) times a day  sertraline (ZOLOFT) 100 mg tablet   Yes Yes   Sig: Take 100 mg by mouth 2 (two) times a day  sodium bicarbonate 650 mg tablet   Yes Yes   Sig: Take 650 mg by mouth 4 (four) times a day     sulfamethoxazole-trimethoprim (BACTRIM) 400-80 mg per tablet   No Yes   Sig: Take 1 tablet by mouth every other day for 30 days   tacrolimus (PROGRAF) 1 mg capsule   Yes Yes   Sig: Take 4 mg by mouth daily AM    traZODone (DESYREL) 150 mg tablet   Yes Yes   Sig: Take 150 mg by mouth daily at bedtime  Facility-Administered Medications: None       Allergies   Allergen Reactions    Morphine And Related GI Intolerance    Penicillins Hives     Tolerates cefazolin    Myrbetriq [Mirabegron] Hives       Objective       Intake/Output Summary (Last 24 hours) at 11/17/17 0901  Last data filed at 11/17/17 0347   Gross per 24 hour   Intake              240 ml   Output                0 ml   Net              240 ml       Invasive Devices:   Peripheral IV 11/16/17 Right Hand (Active)   Site Assessment Clean;Dry; Intact 11/17/2017 12:00 AM   Dressing Type Transparent 11/17/2017 12:00 AM   Line Status Infusing 11/17/2017 12:00 AM   Dressing Status Clean;Dry; Intact 11/17/2017 12:00 AM       Peripheral IV 11/16/17 Right Antecubital (Active)   Site Assessment Clean;Dry; Intact 11/17/2017 12:00 AM   Dressing Type Transparent 11/17/2017 12:00 AM   Line Status Flushed;Saline locked 11/17/2017 12:00 AM   Dressing Status Clean;Dry; Intact 11/17/2017 12:00 AM       Physical Exam   Constitutional: She appears well-developed and well-nourished  HENT:   Dry mucosal membranes  Eyes: Conjunctivae are normal  Pupils are equal, round, and reactive to light  Cardiovascular: Normal rate, regular rhythm and normal heart sounds  Pulmonary/Chest: Effort normal and breath sounds normal  No respiratory distress  She has no wheezes  She exhibits no tenderness  Abdominal: Soft  Bowel sounds are normal  She exhibits no distension and no mass  There is tenderness  There is no rebound and no guarding  Hypoactive bowel sounds  Musculoskeletal: She exhibits no edema  Amputation of the left metatarsals, right big toe  Skin: Skin is warm and dry  She is not diaphoretic  Psychiatric:   Flat affect, slow mentation  Lab Results: I have personally reviewed pertinent reports  Imaging Studies: I have personally reviewed pertinent reports  EKG, Pathology, and Other Studies: I have personally reviewed pertinent reports        VTE Prophylaxis: Heparin

## 2017-11-17 NOTE — H&P
History and Physical - [facility]    Patient Information: Redd Mascorro 47 y o  female MRN: [id number]  Unit/Bed#: ED 17 Encounter: [id number]  Admitting Physician: Mau Moeller MD  PCP: Carmenza Doran MD  Date of Admission:  11/16/17  [facility] Problem List:     Principal Problem:    Epigastric pain  Active Problems:    UTI (urinary tract infection)    Acquired hypothyroidism    Acute kidney injury ([facility] )    Type 1 diabetes mellitus ([place] [address] )    Essential hypertension    Status post simultaneous kidney and pancreas transplant ([place] [address] )    Calculus, bladder, diverticulum    Nausea and vomiting    Stage 3 chronic kidney disease    Heartburn    Hypercalcemia    MGUS (monoclonal gammopathy of unknown significance)    Major depressive disorder    Anxiety   Confusion    Plans:    1  Epigastric pain with heartburn symptoms as well as with nausea and vomiting; abnormal CT scan of the abdomen revealing possibility of foreign bodies in the duodenum; likely dyspepsia with GERD:  Admit the patient to our medical-surgical floor  I will increase patient's Protonix to twice a day  I will ask GI to evaluate this patient  Patient likely will need endoscopic evaluation  Pain control  Antiemetic as needed  Full liquid diet in the meantime  2   Chills and with suprapubic tenderness, likely due to recurrent urinary tract infection with the urinary bladder calculus with diverticulum and patient being immunosuppressed:  Patient was just recently here with E coli urinary tract infection which was sensitive to cefazolin  Patient was started on cefazolin here  Will continue with this antibiotic at this point  We will have Infectious Disease doctor to evaluate the patient  Since patient will be on IV cefazolin, I will hold off the prophylactic doses of Bactrim  3   Acute kidney injury on chronic kidney disease stage 3 with metabolic acidosis:  Nephrology on board    According to the recommendations, patient will be on IV fluid with bicarbonate  Check Prograf level  Monitor patient's input and output, BMP and electrolytes  They ordered for acetone and lactic acid levels  4   Hypercalcemia, likely due to dehydration:  Continue IV fluids  Nephrology on board  Monitor patient's calcium levels  Hold off on vitamin-D  Patient is for workup as per Nephrology which included PTH intact levels, PTH related protein, vitamin-D levels  5   History of kidney and pancreatic transplant on immunosuppressants:  Continue immunosuppressants/transfer medications  Check tacrolimus levels  6   Type 1 diabetes mellitus with hyperglycemia:  Continue patient's home medications with Lantus  Will add insulin sliding scale for this patient  We will adjust this accordingly  7  MGUS:  Patient will need close follow-up with Hematology in the outpatient  8   Hypertension:  Continue blood pressure medications  Will give holding parameters to hold blood pressure medications if the systolic blood pressure is less than 130 to avoid more acute kidney injury  9   Major depressive disorder and anxiety:  Continue psychiatric medications  From my interview with the patient, she does not have any suicidal thoughts or plans  Possible psychiatric consult if patient's psychiatric condition worsens  Patient's father is also concerned about this as mentioned in the sign outs by the emergency room physician  10   Confusion, likely due to acute toxic metabolic encephalopathy, likely secondary to above problems with acute kidney injury with metabolic acidosis and recurrent urinary tract infection:  Will treat the underlying problems  VTE Prophylaxis: Heparin  / sequential compression device   Code Status:  Full code  POLST: POLST form is not discussed and not completed at this time      Anticipated Length of Stay:  Patient will be admitted on an Inpatient basis with an anticipated length of stay of greater than 2 midnights  Justification for Hospital Stay:  Due to the above findings and plans  Total Time for Visit, including Counseling / Coordination of Care: 1 hour  Greater than 50% of this total time spent on direct patient counseling and coordination of care  Chief Complaint:     Epigastric pains with heartburn  History of Present Illness: Ryanne Lofton is a 47 y o  female who presents with epigastric pain to the emergency room at [facility]  Approximately 2 weeks ago, patient was treated here as an inpatient due to urinary tract infection  At that time, patient had E coli UTI  It was treated with Ancef and eventually patient was discharge on Bactrim prophylactic doses as patient is on immunosuppressants due to history of both kidney and pancreatic transplant  Patient was also found to have urinary bladder calculi and patient will follow up with Urology as an outpatient to consider lithotripsy  Patient was discharged to home and was doing fine until approximately 4 days ago  According to the patient, 4 days ago, she started having stomach pains with heartburn symptoms  Patient also told me that she has been having on and off nausea and vomiting since 4 days ago  Due to this, patient has been having poor oral intake since then  Patient told me that due to the above symptoms, she had difficulty taking her medications  The patient denies any other abdominal pains he except for the epigastric pains  Patient also denies any flank pains  Patient denies any urinary symptoms like pain when she urinates or any burning sensation on urination  Patient denies any diarrhea or constipation  Patient told me that she has been having on and off chills for the last 4 days  Patient denies actual fever    However, she told me that she did not have fever even if she had an infection in the past   In the emergency room, patient was found to have a urinary tract infection which is recurrent and patient was started again on IV cefazolin  According to the initial history of this patient was taken by the ER physician and by the nephrologist who saw this patient 1st, patient was confused earlier today  When I saw the CT scan of the abdomen revealing possibility of foreign body in the duodenum, I asked the patient if she has ingested anything like coins, and she denies ingesting anything other than food and fluids  When asked, patient admits that she is depressed and that she has history of depression and anxiety and she takes psychiatric medications for this  When asked, patient completely denies any intentions of hurting herself  Patient completely denies any suicidal intentions, thoughts or plans  Review of Systems:    Review of Systems     Ten point review systems done and they were negative except for the ones I mentioned my HPI and positive for a headache right now  Patient denies any chest pains or any shortness of breath or any cough  Past Medical and Surgical History:     Past Medical History:   Diagnosis Date    Anemia     Cancer ([facility] )     Multiple myeloma    Chronic diastolic (congestive) heart failure 9/18/2017    Diabetes mellitus ([facility] )     Previous, controlled with diet    Disease of thyroid gland     History of transfusion     Hypertension     Night blindness     Psychiatric disorder     Renal disorder     Retinopathy     Status post simultaneous kidney and pancreas transplant ([facility] )     Toe amputation status ([facility] )        Past Surgical History:   Procedure Laterality Date    COMBINED KIDNEY-PANCREAS TRANSPLANT N/A     CYSTOSCOPY N/A 10/13/2016    Procedure: CYSTOSCOPY, retrograde pyelogram, biopsy of ureteral polyp;   Surgeon: Miri Ramírez MD;  Location: [facility];  Service:    [facility]aj Posadas EYE SURGERY      cataracts    FOOT AMPUTATION THROUGH METATARSAL Left     HALLUX VALGUS CORRECTION Right     NEPHRECTOMY TRANSPLANTED ORGAN Meds/Allergies:    all medications and allergies reviewed    Allergies: Allergies   Allergen Reactions    Morphine And Related GI Intolerance    Penicillins Hives     Tolerates cefazolin    Myrbetriq [Mirabegron] Hives     History:     Marital Status: Legally      History   Alcohol Use No     History   Smoking Status    Former Smoker    Quit date: 4/28/2012   Smokeless Tobacco    Former User     History   Drug Use No       Family History:    Family History   Problem Relation Age of Onset    Hypertension Mother     Hypertension Father     Cancer Maternal Grandfather     Cancer Paternal Grandmother     Cancer Paternal Grandfather        Physical Exam:     Vitals:   Blood Pressure: 169/76 (11/16/17 1845)  Pulse: 77 (11/16/17 1845)  Temperature: 97 6 °F (36 4 °C) (11/16/17 1128)  Temp Source: Oral (11/16/17 1128)  Respirations: 18 (11/16/17 1845)  Height: 5' 7 5" (171 5 cm) (11/16/17 1125)  Weight - Scale: 90 7 kg (200 lb) (11/16/17 1125)  SpO2: 98 % (11/16/17 1845)    Physical Exam   Constitutional: She is oriented to person, place, and time  No distress  HENT:   Head: Normocephalic and atraumatic  Mouth/Throat: No oropharyngeal exudate  Oral mucosa, tongue are dry  Lips are dry  Eyes: Conjunctivae and EOM are normal  Pupils are equal, round, and reactive to light  Right eye exhibits no discharge  Left eye exhibits no discharge  No scleral icterus  Neck: Neck supple  No JVD present  No tracheal deviation present  Cardiovascular: Normal rate, regular rhythm and normal heart sounds  Exam reveals no gallop and no friction rub  No murmur heard  Pulmonary/Chest: Effort normal and breath sounds normal  No stridor  No respiratory distress  She has no wheezes  She has no rales  Abdominal: Soft  Bowel sounds are normal  She exhibits no distension  There is tenderness  There is no rebound and no guarding     Positive for epigastric tenderness; positive for mild suprapubic tenderness  Musculoskeletal: She exhibits no edema, tenderness or deformity  Neurological: She is alert and oriented to person, place, and time  No cranial nerve deficit  Presently, patient is coherent and answers questions appropriately  Skin: Skin is warm and dry  No rash noted  She is not diaphoretic  No erythema  No pallor  Psychiatric: Her behavior is normal  Thought content normal    Flat affect  Vitals reviewed  Lab Results: I have personally reviewed pertinent reports  Results from last 7 days  Lab Units 11/16/17  1138   WBC Thousand/uL 9 59   HEMOGLOBIN g/dL 15 1   HEMATOCRIT % 45 9   PLATELETS Thousands/uL 200   NEUTROS PCT % 79*   LYMPHS PCT % 14   MONOS PCT % 5   EOS PCT % 1       Results from last 7 days  Lab Units 11/16/17  1138   SODIUM mmol/L 137   POTASSIUM mmol/L 4 8   CHLORIDE mmol/L 114*   CO2 mmol/L 14*   BUN mg/dL 47*   CREATININE mg/dL 2 09*   CALCIUM mg/dL 11 1*   TOTAL PROTEIN g/dL 10 5*   BILIRUBIN TOTAL mg/dL 0 28   ALK PHOS U/L 127*   ALT U/L 25   AST U/L 22   GLUCOSE RANDOM mg/dL 156*           Imaging: I have personally reviewed pertinent reports  Ct Abdomen Pelvis Wo Contrast    Result Date: 11/16/2017  Narrative: CT ABDOMEN AND PELVIS WITHOUT IV CONTRAST INDICATION:  Pain COMPARISON: June 9, 2017 TECHNIQUE:  CT examination of the abdomen and pelvis was performed without intravenous contrast   Reformatted images were created in axial, sagittal, and coronal planes  Radiation dose length product (DLP) for this visit:  1109 61 mGy-cm   This examination, like all CT scans performed in the [facility], was performed utilizing techniques to minimize radiation dose exposure, including the use of iterative reconstruction and automated exposure control  Enteric contrast was administered  FINDINGS: ABDOMEN LOWER CHEST:  Bibasilar subsegmental atelectasis  LIVER/BILIARY TREE:  Unremarkable  GALLBLADDER:  Gallbladder is surgically absent   SPLEEN: Unremarkable  PANCREAS:  Atrophic pancreas  A presumed right lower quadrant pancreatic transplant by history is stable in appearance from the prior exam  ADRENAL GLANDS:  Unremarkable  KIDNEYS/URETERS:  Bilateral atrophic kidneys  There is a left inferior renal transplant kidney, unchanged in appearance  STOMACH AND BOWEL:  Nodular densities identified in the second/third portion of the duodenum are identified and could be from ingested material  Hyperdense pills are again identified in the gastric antrum  APPENDIX:  No findings to suggest appendicitis  ABDOMINOPELVIC CAVITY:  No ascites or free intraperitoneal air  No lymphadenopathy  VESSELS:  Atherosclerotic changes are present  No evidence of aneurysm  PELVIS REPRODUCTIVE ORGANS:  Unremarkable for patient's age  URINARY BLADDER:   The urinary bladder is stable in appearance from the prior exam and again demonstrates appears to be a diverticulum at the right wall with a stable calcified calculus within its descending portion  ABDOMINAL WALL/INGUINAL REGIONS:  Unremarkable  OSSEOUS STRUCTURES:  No acute fracture or destructive osseous lesion  Impression: No significant interval change from prior CT June 9, 2017 except for nodular subcentimeter densities in the second/third portion of the duodenum which could be from ingested material, correlate clinically  Atrophic kidneys and pelvic transplants are unchanged  Workstation performed: [id number]     Us Kidney And Bladder With Pvr    Result Date: 10/30/2017  Narrative: RENAL ULTRASOUND INDICATION: Urinary incontinence  COMPARISON: September 13, 2017 TECHNIQUE:   Ultrasound of the retroperitoneum was performed with a curvilinear transducer utilizing volumetric sweeps and still imaging techniques  FINDINGS: KIDNEYS: Native kidneys are atrophic bilaterally  Right kidney:  8 5 x 4 1 cm  The renal parenchyma is diffusely echogenic consistent with medical renal disease  No suspicious masses detected       No hydronephrosis  No shadowing calculi  No perinephric fluid collections  Left kidney:  8 5 x 4 0 cm  The renal parenchyma is diffusely echogenic consistent with medical renal disease  No suspicious masses detected  No hydronephrosis  No shadowing calculi  No perinephric fluid collections  Transplanted kidney:  12 3 x 5 7 cm  Normal echogenicity and contour  No suspicious masses detected  Mild scarring in the lower pole  12 mm simple cortical cyst upper pole  No hydronephrosis  No shadowing calculi  No perinephric fluid collections  The resistive indices are normal  URETERS: Nonvisualized  BLADDER: Normally distended  No focal thickening or mass lesions  Right-sided bladder diverticulum containing calculi, stable from the prior examination  Ureteral jet from the transplanted kidney, is not seen  Prevoid bladder volume measures 426 mL  Post void bladder volume measures 20 mL (normal less than 50 mL)  Impression: 1  Normal sonographic appearance of the transplanted kidney  2   Atrophic native kidneys  3   No evidence of post void residual bladder volume  4   Stable right-sided bladder diverticulum containing calculi  Workstation performed: [id number]       EKG, Pathology, and Other Studies Reviewed on Admission:   · No EKG done in the emergency room  Allscripts Records Reviewed: No however, read the discharge summary of this patient on the last admission here  ** Please Note: Dragon 360 Dictation voice to text software may have been used in the creation of this document   **

## 2017-11-17 NOTE — SOCIAL WORK
Met with pt at bedside  Pt states she resides alone in a house with 3 JESSI  Pt states her parents Verito Covarrubias and Cristofer Naylor are her [facility] [phone]  Pt states she is indep at home and uses a cane for ambulation  Pt states she has used [facility] past   Pt states her PCP is Dr Theresa Alfaro in [facility]  Pt uses [facility] on [address] in [facility]  Pt has hx of anxiety and depression and sees Dr Elizabeth Chinchilla every 3 months  Pt states her parents will transport her home  CM reviewed d/c planning process including the following: identifying help at home, patient preference for d/c planning needs, Discharge Lounge, [facility] Meds to Bed program, availability of treatment team to discuss questions or concerns patient and/or family may have regarding understanding medications and recognizing signs and symptoms once discharged  CM also encouraged patient to follow up with all recommended appointments after discharge  Patient advised of importance for patient and family to participate in managing patients medical well being

## 2017-11-17 NOTE — CONSULTS
[address] [place] [address] NOTE   Admission Date: 11/16/2017    Patient Identifiers: Harshal Levy (MRN: [id number], 47 y o , 1965   Service Requesting Consultation: Leticia Mcginnis MD [facility])  Service Providing Consultation:  Urology, Jed Lundborg, PA-C  Consults  Date of Service: 11/17/2017    Reason for Consultation: urology evaluation    History of Present Illness: Harshal Levy is a 47 y o  old with a history of kidney and pancreas transplant and recurrent UTI  Seen during recent hospital stay and the culture at that time was mixed contaminants  She spent some time on NW7 and now returns with diffuse abdominal pain and nausea  Denies dysuria or hematuria  Chills, no fever  Leukocytosis  She is on IV Cefazolin and cultures are pending  The cat scan of the abdomen and pelvis was unremarkable  She has atrophic native kidneys and a normal appearing transplant kidney  Creatinine 2 62 keeping in line with her CKD III  Baseline 1 6-1 9  Urine has microscopic blood and leukocytes  Past Medical, Past Surgical History:     Past Medical History:   Diagnosis Date    Anemia     Cancer ([facility] )     Multiple myeloma    Chronic diastolic (congestive) heart failure 9/18/2017    Diabetes mellitus ([facility] )     Previous, controlled with diet    Disease of thyroid gland     History of transfusion     Hypertension     Night blindness     Psychiatric disorder     Renal disorder     Retinopathy     Status post simultaneous kidney and pancreas transplant ([facility] )     Toe amputation status ([facility] )    :    Past Surgical History:   Procedure Laterality Date    COMBINED KIDNEY-PANCREAS TRANSPLANT N/A     CYSTOSCOPY N/A 10/13/2016    Procedure: CYSTOSCOPY, retrograde pyelogram, biopsy of ureteral polyp;   Surgeon: William Lora MD;  Location: [facility];  Service:    [facility] EYE SURGERY      cataracts    FOOT AMPUTATION THROUGH METATARSAL Left     HALLUX VALGUS CORRECTION Right     NEPHRECTOMY TRANSPLANTED ORGAN     :    Medications, Allergies:     Current Facility-Administered Medications:     acetaminophen (TYLENOL) tablet 650 mg, 650 mg, Oral, Q6H PRN, Adilia Daley MD, 650 mg at 11/17/17 0512    amLODIPine (NORVASC) tablet 10 mg, 10 mg, Oral, Daily, Adilia Daley MD    ARIPiprazole (ABILIFY) tablet 20 mg, 20 mg, Oral, Daily, Adilia Daley MD    aspirin chewable tablet 81 mg, 81 mg, Oral, Daily, Adilia aDley MD    ceFAZolin (ANCEF) IVPB (premix) 1,000 mg, 1,000 mg, Intravenous, Q12H, Timmy Gary PA-C, Last Rate: 100 mL/hr at 11/16/17 2248, 1,000 mg at 11/16/17 2248    cloNIDine (CATAPRES) tablet 0 2 mg, 0 2 mg, Oral, Q24H, Adilia Daley MD    cloNIDine (CATAPRES) tablet 0 3 mg, 0 3 mg, Oral, BID, Adilia Daley MD, 0 3 mg at 11/16/17 2231    doxazosin (CARDURA) tablet 2 mg, 2 mg, Oral, HS, Adilia Daley MD, 2 mg at 11/16/17 2349    DULoxetine (CYMBALTA) delayed release capsule 60 mg, 60 mg, Oral, BID, Adilia Daley MD, 60 mg at 49/51/29 4594    folic acid (FOLVITE) tablet 1 mg, 1 mg, Oral, BID, Adilia Daley MD, 1 mg at 11/16/17 2231    heparin (porcine) subcutaneous injection 5,000 Units, 5,000 Units, Subcutaneous, Q8H [facility], 5,000 Units at 11/17/17 0538 **AND** Platelet count, , , Once, Adilia Daley MD    hydrALAZINE (APRESOLINE) tablet 50 mg, 50 mg, Oral, TID, Adilia Daley MD, 50 mg at 11/16/17 2231    insulin glargine (LANTUS) subcutaneous injection 20 Units, 20 Units, Subcutaneous, Daily With Breakfast, Adilia Daley MD, 20 Units at 11/17/17 0753    insulin lispro (HumaLOG) 100 units/mL subcutaneous injection 1-5 Units, 1-5 Units, Subcutaneous, HS, Adilia Daley MD    insulin lispro (HumaLOG) 100 units/mL subcutaneous injection 1-6 Units, 1-6 Units, Subcutaneous, TID AC **AND** Fingerstick Glucose (POCT), , , TID AC, Adilia Daley MD    levothyroxine tablet 88 mcg, 88 mcg, Oral, Early Morning, Adilia Daley MD, 88 mcg at 11/17/17 0538    LORazepam (ATIVAN) tablet 2 mg, 2 mg, Oral, BID PRN, Adilia Daley MD    metoprolol tartrate (LOPRESSOR) tablet 50 mg, 50 mg, Oral, BID, Adilia Daley MD    ondansetron (ZOFRAN) injection 4 mg, 4 mg, Intravenous, Q6H PRN, Adilia Daley MD, 4 mg at 11/17/17 0019    pantoprazole (PROTONIX) EC tablet 40 mg, 40 mg, Oral, BID AC, Adilia Daley MD, 40 mg at 11/17/17 0601    pravastatin (PRAVACHOL) tablet 80 mg, 80 mg, Oral, Daily, Adilia Daley MD    predniSONE tablet 5 mg, 5 mg, Oral, Daily, Adilia Daley MD    rOPINIRole (REQUIP) tablet 0 25 mg, 0 25 mg, Oral, BID, Adilia Daley MD, 0 25 mg at 11/16/17 2230    senna (SENOKOT) tablet 8 6 mg, 1 tablet, Oral, HS PRN, Adilia Daley MD    sertraline (ZOLOFT) tablet 100 mg, 100 mg, Oral, BID, Adilia Daley MD, 100 mg at 11/16/17 2248    sodium bicarbonate 150 mEq in dextrose 5 % 1,000 mL infusion, 125 mL/hr, Intravenous, Continuous, Jackson Burris MD, Last Rate: 125 mL/hr at 11/17/17 0519, 125 mL/hr at 11/17/17 0519    tacrolimus (PROGRAF) capsule 3 mg, 3 mg, Oral, HS, Adilia Daley MD, 3 mg at 11/16/17 2238    tacrolimus (PROGRAF) capsule 4 mg, 4 mg, Oral, Daily, Adilia Daley MD    traZODone (DESYREL) tablet 150 mg, 150 mg, Oral, HS, Adilia Daley MD, 150 mg at 11/16/17 2238    zolpidem (AMBIEN) tablet 5 mg, 5 mg, Oral, HS PRN, Adilia Daley MD    Allergies:   Allergies   Allergen Reactions    Morphine And Related GI Intolerance    Penicillins Hives     Tolerates cefazolin    Myrbetriq [Mirabegron] Hives   :    Social and Family History:   Social History:   Social History   Substance Use Topics    Smoking status: Former Smoker     Quit date: 4/28/2012    Smokeless tobacco: Former User    Alcohol use No        History   Smoking Status    Former Smoker    Quit date: 4/28/2012   Smokeless Tobacco  Former User       Family History:  Family History   Problem Relation Age of Onset    Hypertension Mother     Hypertension Father     Cancer Maternal Grandfather     Cancer Paternal Grandmother     Cancer Paternal Grandfather    :     Review of Systems:     General: Fever, chills, or night sweats: negative  Cardiac: Negative for chest pain  Pulmonary: Negative for shortness of breath  Gastrointestinal: Abdominal pain positive  Nausea, vomiting, or diarrhea positive,  Genitourinary: See HPI above  Patient does not have hematuria  All other systems queried were negative  Physical Exam:   General: Patient is pleasant and in NAD  Awake and alert  /61   Pulse 90   Temp 97 6 °F (36 4 °C)   Resp 18   Ht 5' 7" (1 702 m)   Wt 89 4 kg (197 lb 1 5 oz)   SpO2 95%   BMI 30 87 kg/m²   Cardiac: Peripheral edema: negative  Pulmonary: Non-labored breathing  Abdomen: Soft, non-tender, non-distended  No surgical scars  No masses, tenderness, hernias noted  Genitourinary: Negative CVA tenderness, negative suprapubic tenderness  URINE: clear     Labs:     Lab Results   Component Value Date    HGB 12 9 11/17/2017    HCT 38 9 11/17/2017    WBC 8 42 11/17/2017     11/17/2017   ]    Lab Results   Component Value Date     11/17/2017    K 3 9 11/17/2017     11/17/2017    CO2 18 (L) 11/17/2017    BUN 52 (H) 11/17/2017    CREATININE 2 62 (H) 11/17/2017    CALCIUM 10 0 11/17/2017    GLUCOSE 126 11/17/2017   ]    Imaging:   I personally reviewed the images and report of the following studies, and reviewed them with the patient:  CT ABDOMEN AND PELVIS WITHOUT IV CONTRAST     IMPRESSION:     No significant interval change from prior CT June 9, 2017 except for nodular subcentimeter densities in the second/third portion of the duodenum which could be from ingested material, correlate clinically  Atrophic kidneys and pelvic transplants are unchanged  ASSESSMENT:     1   Recurrent urinary tract infection   2  Diffuse abdominal pain  3  CKD III  4  Renal and pancreas transplant  5  Type I diabetes  6  HTN  7  Depression/ anxiety       PLAN:     - await culture results  - urology follow up      Thank you for allowing me to participate in this patients care  Please do not hesitate to call with any additional questions    Deedee Alonso PA-C

## 2017-11-17 NOTE — ED NOTES
Pt report given to [facility], Physician at bedside obtained IV using US  Pediatic Blood culture obtained with mick Mcneil RN  11/16/17 2055

## 2017-11-17 NOTE — PROGRESS NOTES
[facility] Internal Medicine Progress Note  Patient: Cesar Delong 47 y o  female   MRN: [id number]  PCP: Swetha Caban MD  Unit/Bed#: [facility] 725-01 Encounter: [id number]  Date Of Visit: 11/17/17    Assessment:    Principal Problem:    Epigastric pain  Active Problems:    UTI (urinary tract infection)    Acquired hypothyroidism    Confusion    Acute kidney injury ([facility] [address] )    Type 1 diabetes mellitus ([place] [address] )    Essential hypertension    Status post simultaneous kidney and pancreas transplant ([facility] )    Calculus, bladder, diverticulum    Nausea and vomiting    Stage 3 chronic kidney disease    Heartburn    Hypercalcemia    MGUS (monoclonal gammopathy of unknown significance)    Major depressive disorder    Anxiety      Plan:    · Epigastric pain with heartburn like symptoms:  · - patient also on admission with nausea and vomiting  · - CT of the abdomen revealing possibility of foreign body in the duodenum, likely dyspepsia with GERD  · - patient  · -started on Protonix b i d   · GI, consulted for evaluation:  Per resident notes due to lack of bloody bowel movements out any GI bleed, reviewed imaging field that hyperdense pills are identified in the gastric antrum  · - August 13 patient underwent colonoscopy which showed negative rectal hyperplastic inflammatory polyps  · - endoscopy in 2014 demonstrated ulcerative reflux esophagitis  · - possibly recommending repeat endoscopy during this hospitalization, per GI resident  · - GI resident recommending continue a shin of clear liquids as tolerated  · - with PPI and Zofran for symptomatic treatment:  ·   · Bacteruria:   · - appreciate Infectious Disease  · - id do not believe that patient's symptoms are similar at all to her prior UTIs, per ID documentation patient's father states that he does not feel that the patient is compliant with taking her medications or caring for herself well  · IV cephazolin will be discontinued and patient will be initiated on p o   Bactrim prophylaxis  ·   · Acute on chronic CKD stage 3:  · - appreciate Nephrology  · - baseline creatinine of 0 6-1 9  · - today creatinine 2 62 increased from yesterday  · - patient seen outpatient by Dr Carolee Shafer  · - patient has history of renal transplant about 20 years ago at [facility] on tacrolimus and prednisone  · In last 24 hours patient with increasingly worse renal function, nephrology suspect secondary to infection/pre renal hypercalcemia with possible Prograf toxicity  · - obtain Prograf level  · - PTH intact level pending PTH  · - per Nephrology patient will need close follow-up with Hematology as an outpatient given question of of MGUS versus myeloma especially in the setting of hypercalcemia elevated total protein and rising light chains  · - recommendations at this time are to start bicarbonate and IV fluids  ·   · Chills and suprapubic tenderness:  · - patient is seen by Urology this morning, discussed with Betsy GOVEA  · - feel urine culture likely colonization do not feel abdominal pain secondary to urinary issue  · - urology reviewed imaging and felt CT scan of abdomen and pelvis unremarkable with atrophic appearing native kidney and normal appearing transplant kidney    · Recommendations are to await urine culture  ·   · Hypercalcemia, possibly due to dehydration,  Also with MGUS:   · - appreciate nephro will need to follow up with hematology at discharge   · - ionized calcium 1 36  ·   · History of kidney and pancreatic transplant:  · - on immunosuppressant medications  · - lab levels pending  ·   · Type 1 diabetes with hyperglycemia:  · - continue patient's home medications with Lantus  · - sliding scale added/diet appropriate  ·   · Major depressive disorder and anxiety:  · - continue psychiatric medications  · - continue psychiatric medication  · - if any concerns would consult Psychiatry  · - patient's father is concerned about patient's mental status, will observe  ·   · Confusion:  · - likely secondary to acute toxic metabolic encephalopathy secondary to acute kidney injury with metabolic acidosis  · - patient is lethargic        VTE Pharmacologic Prophylaxis:   Pharmacologic: Heparin  Mechanical VTE Prophylaxis in Place: Yes    Patient Centered Rounds: I have performed bedside rounds with nursing staff today  Discussions with Specialists or Other Care Team Provider: nursing     Education and Discussions with Family / Patient: patient     Time Spent for Care: 30 minutes  More than 50% of total time spent on counseling and coordination of care as described above  Current Length of Stay: 1 day(s)    Current Patient Status: Inpatient   Certification Statement: The patient will continue to require additional inpatient hospital stay due to discharge home when  medically stable     Discharge Plan / Estimated Discharge Date: discharge home     Code Status: Level 1 - Full Code      Subjective:   Pt is lethargic, falling asleep as I talk with her  I cannot talk more than a few words before patient falls back asleep  Discussed with nursing will check blood sugar at this time  Patient just states that she is very tired  Denies any pain in fact says no  Abdomen are palpated no signs of abdominal pain at this time  Objective:     Vitals:   Temp (24hrs), Av 7 °F (36 5 °C), Min:97 2 °F (36 2 °C), Max:98 2 °F (36 8 °C)    HR:  [68-90] 90  Resp:  [16-18] 18  BP: (144-175)/(54-76) 149/61  SpO2:  [95 %-99 %] 95 %  Body mass index is 30 87 kg/m²  Input and Output Summary (last 24 hours): Intake/Output Summary (Last 24 hours) at 17 1550  Last data filed at 17 1350   Gross per 24 hour   Intake          2919 58 ml   Output                0 ml   Net          2919 58 ml       Physical Exam:     Physical Exam   Constitutional: She appears well-developed and well-nourished  No distress  HENT:   Head: Normocephalic and atraumatic  Mouth/Throat: No oropharyngeal exudate     Eyes: Conjunctivae are normal  Pupils are equal, round, and reactive to light  Right eye exhibits no discharge  Left eye exhibits no discharge  No scleral icterus  Neck: Normal range of motion  No tracheal deviation present  No thyromegaly present  Cardiovascular: Normal rate  Exam reveals no gallop and no friction rub  No murmur heard  Pulmonary/Chest: Effort normal  No stridor  No respiratory distress  She has no wheezes  She has no rales  She exhibits no tenderness  Abdominal: She exhibits no distension and no mass  There is no tenderness  There is no rebound and no guarding  Musculoskeletal: She exhibits no edema, tenderness or deformity  Lymphadenopathy:     She has no cervical adenopathy  Neurological: She is alert  But currently lethargic falling asleep as I talk with her   Skin: Skin is warm  No rash noted  She is not diaphoretic  No erythema  No pallor  Psychiatric:   Lethargic            Additional Data:     Labs:      Results from last 7 days  Lab Units 11/17/17  1415 11/17/17  0519   WBC Thousand/uL  --  8 42   HEMOGLOBIN g/dL  --  12 9   HEMATOCRIT %  --  38 9   PLATELETS Thousands/uL 180 204   NEUTROS PCT %  --  70   LYMPHS PCT %  --  20   MONOS PCT %  --  7   EOS PCT %  --  2       Results from last 7 days  Lab Units 11/17/17  0519 11/16/17  1138   SODIUM mmol/L 137 137   POTASSIUM mmol/L 3 9 4 8   CHLORIDE mmol/L 108 114*   CO2 mmol/L 18* 14*   BUN mg/dL 52* 47*   CREATININE mg/dL 2 62* 2 09*   CALCIUM mg/dL 10 0 11 1*   TOTAL PROTEIN g/dL  --  10 5*   BILIRUBIN TOTAL mg/dL  --  0 28   ALK PHOS U/L  --  127*   ALT U/L  --  25   AST U/L  --  22   GLUCOSE RANDOM mg/dL 126 156*           * I Have Reviewed All Lab Data Listed Above  * Additional Pertinent Lab Tests Reviewed:  All Labs Within Last 24 Hours Reviewed    Imaging:    Imaging Reports Reviewed Today Include: reviewed       Recent Cultures (last 7 days):       Results from last 7 days  Lab Units 11/16/17  1803   URINE CULTURE >100,000 cfu/ml Gram Negative Jerald resembling Escherichia coli*       Last 24 Hours Medication List:     amLODIPine 10 mg Oral Daily   ARIPiprazole 20 mg Oral Daily   aspirin 81 mg Oral Daily   cefazolin 1,000 mg Intravenous Q12H   cloNIDine 0 2 mg Oral Q24H   cloNIDine 0 3 mg Oral BID   doxazosin 2 mg Oral HS   DULoxetine 60 mg Oral BID   folic acid 1 mg Oral BID   heparin (porcine) 5,000 Units Subcutaneous Q8H [address]   hydrALAZINE 50 mg Oral TID   insulin glargine 20 Units Subcutaneous Daily With Breakfast   insulin lispro 1-5 Units Subcutaneous HS   insulin lispro 1-6 Units Subcutaneous TID AC   levothyroxine 88 mcg Oral Early Morning   metoprolol tartrate 50 mg Oral BID   pantoprazole 40 mg Oral BID AC   pravastatin 80 mg Oral Daily   predniSONE 5 mg Oral Daily   rOPINIRole 0 25 mg Oral BID   sertraline 100 mg Oral BID   tacrolimus 3 mg Oral HS   tacrolimus 4 mg Oral Daily   traZODone 150 mg Oral HS        Today, Patient Was Seen By: MER Walker    ** Please Note: This note has been constructed using a voice recognition system   **

## 2017-11-17 NOTE — PLAN OF CARE
DISCHARGE PLANNING - CARE MANAGEMENT     Discharge to post-acute care or home with appropriate resources Progressing        INFECTION - ADULT     Absence or prevention of progression during hospitalization Progressing     Absence of fever/infection during neutropenic period Progressing        PAIN - ADULT     Verbalizes/displays adequate comfort level or baseline comfort level Progressing        Potential for Falls     Patient will remain free of falls Progressing        SAFETY ADULT     Maintain or return to baseline ADL function Progressing     Maintain or return mobility status to optimal level Progressing

## 2017-11-17 NOTE — PROGRESS NOTES
Progress Note - Nephrology   Mercedes Arias 47 y o  female MRN: [id number]  Unit/Bed#: [facility] 725-01 Encounter: [id number]    ASSESSMENT AND PLAN:  1   CKD stage 3:  Baseline creatinine is 1 6-1 9 followed by Dr Mylene Rollins status post renal transplant 20 years ago [facility] on tacrolimus and prednisone  2   EDUARDO:  Worsening renal function in the last 24 hours  Most likely related to infection/prerenal/hypercalcemia/possible Prograf toxicity  No evidence of obstructive uropathy  Recommendations:  -IV fluids with bicarbonate  -calcium is improved  -Prograf level:  Pending  -renal ultrasound of the transplanted kidney (CT scan demonstrated no significant pathology):  NORMAL ULTRASOUND  NEGATIVE POSTVOID RESIDUAL  3   Hypercalcemia:  Borderline hypercalcemia  She has MGUS  I am little bit concerned that her light chain ratio to go up slightly  She is on vitamin-D as well  Her hypercalcemia may been precipitated by dehydration  Recommendations:  -stopped vitamin-D  -IV fluids  -check workup including:  PTH intact level :  PENDING  PTH related protein:  PENDING  Vitamin-D 1, 25:  PENDING  Vitamin-D 25 0H:  43 9  SHE WILL NEED CLOSE FOLLOW UP WITH HEMATOLOGY AS AN OUTPATIENT GIVEN?  OF MGUS VERSUS MYELOMA ESPECIALLY IN THE SETTING OF HYPERCALCEMIA ELEVATED TOTAL PROTEIN AND RISING LIGHT CHAINS  4   Metabolic acidosis:  Anion gap is 11  Probably related to CKD with a pancreas transplant  No evidence of diabetic ketoacidosis or lactic acidosis  Recommendations  - acetone level:  Negative  -lactic acid level:  Negative  Treatment:  with a bicarbonate drip; oral sodium bicarbonate  5  Anemia:  Hemoglobin acceptable at 12 9   6   Hypertension:  Fairly reasonable now was higher earlier  Monitor on current regimen  6   Infectious disease:?  Recurrent UTI  I am concerned about the shaking chills on Bactrim and on immunosuppressive medications    Urine suggestive of recurrent urinary tract infection with innumerable WBCs and bacteria  Cultures are pending  Follow-up with Infectious Disease and Urology  Antibiotics per primary team along with Infectious Disease  Subjective: The patient looks somewhat better today  Still complaining of abdominal pain but better  She has nausea but no vomiting, no diarrhea  No chest pain or shortness of breath  No urinary symptoms today  She is much more alert today  Objective:     Vitals: Blood pressure 149/61, pulse 90, temperature 97 6 °F (36 4 °C), resp  rate 18, height 5' 7" (1 702 m), weight 89 4 kg (197 lb 1 5 oz), SpO2 95 %  ,Body mass index is 30 87 kg/m²      Weight (last 2 days)     Date/Time   Weight    11/17/17 0600  89 4 (197 09)    11/16/17 2146  89 2 (196 65)    11/16/17 1125  90 7 (200)                Intake/Output Summary (Last 24 hours) at 11/17/17 1152  Last data filed at 11/17/17 0941   Gross per 24 hour   Intake              480 ml   Output                0 ml   Net              480 ml            Physical Exam: General:  Still weak appearing but improved compared to yesterday , with no shaking chills  Skin:  No acute rash  Eyes:  No scleral icterus  ENT:  Moist mucous membranes  Neck:  Supple, no jugular venous distention  Chest:  Clear to auscultation  CVS:  No rub or gallops appreciated  Abdomen:  Soft and nontender with normal bowel sounds  Extremities:  No edema  Neuro:  Grossly intact  Psych:  Much more alert, and oriented today                Medications:    Scheduled Meds:  amLODIPine 10 mg Oral Daily   ARIPiprazole 20 mg Oral Daily   aspirin 81 mg Oral Daily   cefazolin 1,000 mg Intravenous Q12H   cloNIDine 0 2 mg Oral Q24H   cloNIDine 0 3 mg Oral BID   doxazosin 2 mg Oral HS   DULoxetine 60 mg Oral BID   folic acid 1 mg Oral BID   heparin (porcine) 5,000 Units Subcutaneous Q8H [facility]   hydrALAZINE 50 mg Oral TID   insulin glargine 20 Units Subcutaneous Daily With Breakfast   insulin lispro 1-5 Units Subcutaneous HS   insulin lispro 1-6 Units Subcutaneous TID AC   levothyroxine 88 mcg Oral Early Morning   metoprolol tartrate 50 mg Oral BID   pantoprazole 40 mg Oral BID AC   pravastatin 80 mg Oral Daily   predniSONE 5 mg Oral Daily   rOPINIRole 0 25 mg Oral BID   sertraline 100 mg Oral BID   tacrolimus 3 mg Oral HS   tacrolimus 4 mg Oral Daily   traZODone 150 mg Oral HS       PRN Meds:   acetaminophen    LORazepam    ondansetron    senna    zolpidem    Continuous Infusions:  sodium bicarbonate infusion 125 mL/hr Last Rate: 125 mL/hr (11/17/17 0519)       Lab, Imaging and other studies: I have personally reviewed pertinent labs    Laboratory Results:    Results from last 7 days  Lab Units 11/17/17 0519 11/16/17  1138   WBC Thousand/uL 8 42 9 59   HEMOGLOBIN g/dL 12 9 15 1   HEMATOCRIT % 38 9 45 9   PLATELETS Thousands/uL 204 200   SODIUM mmol/L 137 137   POTASSIUM mmol/L 3 9 4 8   CHLORIDE mmol/L 108 114*   CO2 mmol/L 18* 14*   BUN mg/dL 52* 47*   CREATININE mg/dL 2 62* 2 09*   CALCIUM mg/dL 10 0 11 1*   PHOSPHORUS mg/dL 3 8  --    ALBUMIN g/dL  --  3 2*   TOTAL PROTEIN g/dL  --  10 5*   GLUCOSE RANDOM mg/dL 126 156*     Urinalysis: Lab Results   Component Value Date    COLORU Yellow 11/16/2017    COLORU Yellow 07/20/2015    CLARITYU cloudy 11/16/2017    CLARITYU Cloudy 11/16/2017    CLARITYU Clear 07/20/2015    SPECGRAV 1 020 11/16/2017    SPECGRAV 1 008 07/20/2015    PHUR 7 0 11/16/2017    PHUR 8 0 07/20/2015    LEUKOCYTESUR Large (A) 11/16/2017    LEUKOCYTESUR Large (A) 07/20/2015    NITRITE Positive (A) 11/16/2017    NITRITE Negative 07/20/2015    PROTEINUA 100 (2+) (A) 11/16/2017    PROTEINUA 30 (1+) (A) 07/20/2015    GLUCOSEU Negative 11/16/2017    GLUCOSEU Negative 07/20/2015    KETONESU Negative 11/16/2017    KETONESU Negative 07/20/2015    BILIRUBINUR Negative 11/16/2017    BILIRUBINUR Negative 07/20/2015    BLOODU Trace (A) 11/16/2017    BLOODU Negative 07/20/2015     ABGs: No results found for: [facility]  Radiology review:     Portions of the record may have been created with voice recognition software   Occasional wrong word or "sound a like" substitutions may have occurred due to the inherent limitations of voice recognition software   Read the chart carefully and recognize, using context, where substitutions have occurred

## 2017-11-17 NOTE — CONSULTS
Consultation - Infectious Disease   Evelio Cook 47 y o  female MRN: [id number]  [facility] Encounter: [id number]      Inpatient consult to Infectious Diseases  Consult performed by: Soham Blair  Consult ordered by: Grace Michelle RECOMMENDATIONS:   Impression:  1  Bacteriuria with possible recurrent UTI with bladder calculi and diverticula  2  Type 1 diabetes mellitus with nephropathy, retinopathy, neuropathy, and PAD, S/P renal transplant and failed pancreatic transplant  3  S/P left transmetatarsal and right hallux amputation  4  CKD secondary to 2  Recommendations:    Discussed with the primary service and Nephrology  1   The patient's symptoms this time are not not similar to her prior UTIs  In addition her father has stated that he does not feel that patient is compliant with her medications were taking care of herself well  2   Therefore, we will consider discontinuing the cefazolin and placing her back on the p o  Bactrim  prophylaxis in the next 24-48 hours if she improves  Dayanna Trinh HISTORY OF PRESENT ILLNESS:    Reason for Consult:  possible UTI  HPI: Evelio Cook is a 47y o  year old female who is known to me from prior admissions and who is now readmitted with confusion, abdominal pain and some nausea and vomiting  Patient was last here from 10/30/17-11/2/17 with back pain and burning dysuria but without fever and leukocytosis  Those symptoms have been are general pattern in the past and usually recurred after antibiotics were stopped  She was therefore treated with several days of cefazolin with cessation of her symptoms and then placed on Bactrim single strength 1 tablet 3 times a week as prophylaxis  Cording to her family there is a real doubt whether she has been taking her medications  Four days prior to admission she began to have abdominal pain and some confusion which worsened over the next several days    There was no fever, back pain or dysuria      Review of Systems difficult to obtain accurately because of patient's confusion  In addition she said that she has had some abdominal pain, weakness, nausea, and vomiting  A [id number] point system-based review of systems is otherwise negative  PAST MEDICAL HISTORY:  Past Medical History:   Diagnosis Date    Anemia     Cancer ([facility] )     Multiple myeloma    Chronic diastolic (congestive) heart failure 2017    Diabetes mellitus ([place] [id number] )     Previous, controlled with diet    Disease of thyroid gland     History of transfusion     Hypertension     Night blindness     Psychiatric disorder     Renal disorder     Retinopathy     Status post simultaneous kidney and pancreas transplant ([facility] )     Toe amputation status ([place] [id number] )      Past Surgical History:   Procedure Laterality Date    CHOLECYSTECTOMY      COMBINED KIDNEY-PANCREAS TRANSPLANT N/A     CYSTOSCOPY N/A 10/13/2016    Procedure: CYSTOSCOPY, retrograde pyelogram, biopsy of ureteral polyp; Surgeon: Rustam Yuan MD;  Location: [facility];  Service:    [facility] EYE SURGERY      cataracts    FOOT AMPUTATION THROUGH METATARSAL Left     HALLUX VALGUS CORRECTION Right     NEPHRECTOMY TRANSPLANTED ORGAN         FAMILY HISTORY:  Non-contributory    SOCIAL HISTORY:  Social History   Legally   History   Alcohol Use No     History   Drug Use No     History   Smoking Status    Former Smoker    Quit date: 2012   Smokeless Tobacco    Former User       ALLERGIES:  Allergies   Allergen Reactions    Morphine And Related GI Intolerance    Penicillins Hives     Tolerates cefazolin    Myrbetriq [Mirabegron] Hives       MEDICATIONS:  All current active medications have been reviewed        PHYSICAL EXAM:  HR:  [68-90] 90  Resp:  [16-20] 18  BP: (144-182)/(54-78) 149/61  SpO2:  [95 %-99 %] 95 %  Temp (24hrs), Av 7 °F (36 5 °C), Min:97 2 °F (36 2 °C), Max:98 2 °F (36 8 °C)  Current: Temperature: 97 6 °F (36 4 °C)    Intake/Output Summary (Last 24 hours) at 11/17/17 1357  Last data filed at 11/17/17 1350   Gross per 24 hour   Intake          2919 58 ml   Output                0 ml   Net          2919 58 ml       General Appearance:  Appearing chronically ill, confused female who was nontoxic, and in no distress, appears stated age   Head:  Normocephalic, without obvious abnormality, atraumatic   Eyes:  PERRL, conjunctiva pink and sclera anicteric, both eyes, vision grossly decreased   Nose: Nares normal, mucosa normal, no drainage   Throat: Oropharynx moist without lesions; lips, mucosa, and tongue normal; teeth and gums normal   Neck: Supple, symmetrical, trachea midline, no adenopathy, no tenderness/mass/nodules   Back:   Symmetric, no curvature, ROM normal, no CVA tenderness   Lungs:   Clear to auscultation bilaterally, no audible wheezes, rhonchi and rales, respirations unlabored   Chest Wall:  No tenderness or deformity   Heart:  Regular rate and rhythm, S1, S2 normal, no murmur, rub or gallop   Abdomen:   Soft, non-tender, non-distended, positive bowel sounds, no masses, no organomegaly, surgical scars    No CVA tenderness, palpable transplant kidney   Extremities: Extremities normal, pulses are decreased atraumatic, no cyanosis, clubbing or edema, S/P left transmetatarsal amputation and right hallux amputation   Skin: As above, surgical scars   Lymph nodes: Cervical, supraclavicular, and axillary nodes normal   Neurologic: Alert and oriented times 3, extremity strength 5/5 and symmetric           Invasive Devices:   Peripheral IV 11/16/17 Right Hand (Active)   Site Assessment Clean;Dry; Intact 11/17/2017 12:00 PM   Dressing Type Transparent;Securing device 11/17/2017 12:00 PM   Line Status Infusing 11/17/2017 12:00 PM   Dressing Status Clean;Dry; Intact 11/17/2017 12:00 PM   Dressing Change Due 11/20/17 11/17/2017 12:00 PM       Peripheral IV 11/16/17 Right Antecubital (Active)   Site Assessment Clean;Dry; Intact 11/17/2017 12:00 PM Dressing Type Transparent;Securing device 11/17/2017 12:00 PM   Line Status Capped;Flushed;Saline locked 11/17/2017 12:00 PM   Dressing Status Clean;Dry; Intact 11/17/2017 12:00 PM   Dressing Change Due 11/20/17 11/17/2017 12:00 PM       LABS, IMAGING, & OTHER STUDIES:  Lab Results:      I have personally reviewed pertinent labs  Results from last 7 days  Lab Units 11/17/17  0519 11/16/17  1138   WBC Thousand/uL 8 42 9 59   HEMOGLOBIN g/dL 12 9 15 1   PLATELETS Thousands/uL 204 200       Results from last 7 days  Lab Units 11/17/17  0519 11/16/17  1138   SODIUM mmol/L 137 137   POTASSIUM mmol/L 3 9 4 8   CHLORIDE mmol/L 108 114*   CO2 mmol/L 18* 14*   ANION GAP mmol/L 11 9   BUN mg/dL 52* 47*   CREATININE mg/dL 2 62* 2 09*   EGFR ml/min/1 73sq m 20 26   GLUCOSE RANDOM mg/dL 126 156*   CALCIUM mg/dL 10 0 11 1*   AST U/L  --  22   ALT U/L  --  25   ALK PHOS U/L  --  127*   TOTAL PROTEIN g/dL  --  10 5*   ALBUMIN g/dL  --  3 2*   BILIRUBIN TOTAL mg/dL  --  0 28       Results from last 7 days  Lab Units 11/16/17  1803   URINE CULTURE  >100,000 cfu/ml Gram Negative Jerald resembling Escherichia coli*       Imaging Studies:   I have personally reviewed pertinent imaging study reports and images in PACS  EKG, Pathology, and Other Studies:   I have personally reviewed pertinent reports

## 2017-11-17 NOTE — CASE MANAGEMENT
Initial Clinical Review    Admission: Date/Time/Statement: 11/16/17 @ 1738     Orders Placed This Encounter   Procedures    Inpatient Admission     Standing Status:   Standing     Number of Occurrences:   1     Order Specific Question:   Admitting Physician     Answer:   Enma Perdue [[id number]]     Order Specific Question:   Level of Care     Answer:   Med Surg [16]     Order Specific Question:   Estimated length of stay     Answer:   More than 2 Midnights     Order Specific Question:   Certification     Answer:   I certify that inpatient services are medically necessary for this patient for a duration of greater than two midnights  See H&P and MD Progress Notes for additional information about the patient's course of treatment  ED: Date/Time/Mode of Arrival:   ED Arrival Information     Expected Arrival Acuity Means of Arrival Escorted By Service Admission Type    - 11/16/2017 11:15 Urgent Walk-In Spouse General Medicine Urgent    Arrival Complaint    NAUSEA          Chief Complaint:   Chief Complaint   Patient presents with    Vomiting     Pt reports abd pain  Family reports abd pain has been getting worse and pt lives alone and cant be trusted to take meds etc       History of Illness: Terrell Weeks is a 47 y o  female who presents with epigastric pain to the emergency room at [facility]  Approximately 2 weeks ago, patient was treated here as an inpatient due to urinary tract infection  At that time, patient had E coli UTI  It was treated with Ancef and eventually patient was discharge on Bactrim prophylactic doses as patient is on immunosuppressants due to history of both kidney and pancreatic transplant  Patient was also found to have urinary bladder calculi and patient will follow up with Urology as an outpatient to consider lithotripsy  Patient was discharged to home and was doing fine until approximately 4 days ago    According to the patient, 4 days ago, she started having stomach pains with heartburn symptoms  Patient also told me that she has been having on and off nausea and vomiting since 4 days ago  Due to this, patient has been having poor oral intake since then  Patient told me that due to the above symptoms, she had difficulty taking her medications  Patient told me that she has been having on and off chills for the last 4 days  In the emergency room, patient was found to have a urinary tract infection which is recurrent and patient was started again on IV cefazolin  According to the initial history  taken by the ER physician and by the nephrologist who saw this patient 1st, patient was confused earlier today  When I saw the CT scan of the abdomen revealing possibility of foreign body in the duodenum, I asked the patient if she has ingested anything like coins, and she denies ingesting anything other than food and fluids  When asked, patient admits that she is depressed and that she has history of depression and anxiety and she takes psychiatric medications for this  When asked, patient completely denies any intentions of hurting herself    Patient completely denies any suicidal intentions, thoughts or plans            ED Vital Signs:   ED Triage Vitals   Temperature Pulse Respirations Blood Pressure SpO2   11/16/17 1128 11/16/17 1125 11/16/17 1125 11/16/17 1125 11/16/17 1125   97 6 °F (36 4 °C) 77 18 134/63 97 %      Temp Source Heart Rate Source Patient Position - Orthostatic VS BP Location FiO2 (%)   11/16/17 1128 11/16/17 1125 11/16/17 1125 11/16/17 1125 --   Oral Monitor Sitting Left arm       Pain Score       11/16/17 1125       8        Wt Readings from Last 1 Encounters:   11/17/17 89 4 kg (197 lb 1 5 oz)       Vital Signs (abnormal):     Date/Time  Temp  Pulse  Resp  BP  SpO2  O2 Device  Patient Position - Orthostatic VS   11/16/17 2350   97 2 °F (36 2 °C)  88  18  144/60  95 %  None (Room air)  Lying   11/16/17 2015  --  80  18   175/75  95 % None (Room air)  --   11/16/17 1930  --  76  17   171/76  95 %  None (Room air)  --   11/16/17 1715  --  68  16   172/74  99 %  None (Room air)             Abnormal Labs/Diagnostic Test Results:    Oral mucosa, tongue are dry  Lips are dry  Positive for epigastric tenderness; positive for mild suprapubic tenderness  Flat affect  Ct Abdomen Pelvis Wo Contrast  Impression: No significant interval change from prior CT June 9, 2017 except for nodular subcentimeter densities in the second/third portion of the duodenum which could be from ingested material, correlate clinically  Atrophic kidneys and pelvic transplants are unchanged    Lab Units 11/16/17  1138   CHLORIDE mmol/L 114*   CO2 mmol/L 14*   BUN mg/dL 47*   CREATININE mg/dL 2 09*   CALCIUM mg/dL 11 1*   TOTAL PROTEIN g/dL 10 5*   ALK PHOS U/L 127*   GLUCOSE RANDOM mg/dL 156*       ED Treatment:   Medication Administration from 11/16/2017 1114 to 11/16/2017 2114       Date/Time Order Dose Route     11/16/2017 1438 fentanyl citrate (PF) 100 MCG/2ML 25 mcg 25 mcg Intravenous     11/16/2017 1843 sodium bicarbonate 150 mEq in dextrose 5 % 1,000 mL infusion 125 mL/hr Intravenous     11/16/2017 1843 fentanyl citrate (PF) 100 MCG/2ML 25 mcg 25 mcg Intravenous       Past Medical/Surgical History:    Active Ambulatory Problems     Diagnosis Date Noted    Renal transplant recipient 04/15/2016    Chronic kidney disease, stage 4 (severe) ([facility] ) 04/15/2016    UTI (urinary tract infection) 04/29/2016    Acquired hypothyroidism 04/29/2016    Benign hypertension with CKD (chronic kidney disease) stage IV ([facility] ) 04/29/2016    Confusion 07/12/2016    Toxic metabolic encephalopathy 59/67/7151    [facility] 09/13/2016    UTI (urinary tract infection) 09/13/2016    Type 1 diabetes mellitus (HCC)     Essential hypertension     Anemia     Status post simultaneous kidney and pancreas transplant ([facility] )     Immunosuppression ([facility] ) 02/09/2017    Chronic diastolic congestive heart failure ([facility] ) 09/18/2017    Urinary incontinence 10/30/2017    Calculus, bladder, diverticulum 10/30/2017     Resolved Ambulatory Problems     Diagnosis Date Noted    Hyperglycemia 04/15/2016    ARF (acute renal failure) ([place] [address] ) 04/15/2016    Acute cystitis 04/18/2016    Dysuria 08/01/2016    Hypokalemia 09/17/2016    Low bicarbonate level 02/09/2017    Abdominal pain 06/14/2017    Pyelonephritis 06/14/2017    Weakness 06/14/2017     Past Medical History:   Diagnosis Date    Anemia     Cancer ([place] [address] )     Chronic diastolic (congestive) heart failure 9/18/2017    Diabetes mellitus ([place] [address] )     Disease of thyroid gland     History of transfusion     Hypertension     Night blindness     Psychiatric disorder     Renal disorder     Retinopathy     Status post simultaneous kidney and pancreas transplant ([place] [address] )     Toe amputation status ([place] [address] )        Admitting Diagnosis: Hypercalcemia [E83 52]  Heartburn [R12]  Confusion [R41 0]  Nausea [R11 0]  Epigastric pain [R10 13]  UTI (urinary tract infection) [N39 0]  Nausea and vomiting [R11 2]  Calculus, bladder, diverticulum [N21 0]  Low bicarbonate [E87 8]  Acute kidney injury ([place] [address] ) [N17 9]  Immunosuppression ([place] [address] ) [D89 9]  Renal transplant recipient [Z94 0]    Age/Sex: 47 y o  female    Assessment/Plan:     1  Epigastric pain with heartburn symptoms as well as with nausea and vomiting; abnormal CT scan of the abdomen revealing possibility of foreign bodies in the duodenum; likely dyspepsia with GERD:  Admit the patient to our medical-surgical floor  I will increase patient's Protonix to twice a day  I will ask GI to evaluate this patient  Patient likely will need endoscopic evaluation  Pain control  Antiemetic as needed  Full liquid diet in the meantime      2    Chills and with suprapubic tenderness, likely due to recurrent urinary tract infection with the urinary bladder calculus with diverticulum and patient being immunosuppressed:  Patient was just recently here with E coli urinary tract infection which was sensitive to cefazolin  Patient was started on cefazolin here  Will continue with this antibiotic at this point  We will have Infectious Disease doctor to evaluate the patient  Since patient will be on IV cefazolin, I will hold off the prophylactic doses of Bactrim      3  Acute kidney injury on chronic kidney disease stage 3 with metabolic acidosis:  Nephrology on board  According to the recommendations, patient will be on IV fluid with bicarbonate  Check Prograf level  Monitor patient's input and output, BMP and electrolytes  They ordered for acetone and lactic acid levels      4  Hypercalcemia, likely due to dehydration:  Continue IV fluids  Nephrology on board  Monitor patient's calcium levels  Hold off on vitamin-D  Patient is for workup as per Nephrology which included PTH intact levels, PTH related protein, vitamin-D levels      5  History of kidney and pancreatic transplant on immunosuppressants:  Continue immunosuppressants/transfer medications  Check tacrolimus levels      6  Type 1 diabetes mellitus with hyperglycemia:  Continue patient's home medications with Lantus  Will add insulin sliding scale for this patient  We will adjust this accordingly      7  MGUS:  Patient will need close follow-up with Hematology in the outpatient      8  Hypertension:  Continue blood pressure medications  Will give holding parameters to hold blood pressure medications if the systolic blood pressure is less than 130 to avoid more acute kidney injury      9  Major depressive disorder and anxiety:  Continue psychiatric medications  From my interview with the patient, she does not have any suicidal thoughts or plans  Possible psychiatric consult if patient's psychiatric condition worsens  Patient's father is also concerned about this as mentioned in the sign outs by the emergency room physician      10  Confusion, likely due to acute toxic metabolic encephalopathy, likely secondary to above problems with acute kidney injury with metabolic acidosis and recurrent urinary tract infection:  Will treat the underlying problems          Admission Orders:  Scheduled Meds:   amLODIPine 10 mg Oral Daily   ARIPiprazole 20 mg Oral Daily   aspirin 81 mg Oral Daily   cefazolin 1,000 mg Intravenous Q12H   cloNIDine 0 2 mg Oral Q24H   cloNIDine 0 3 mg Oral BID   doxazosin 2 mg Oral HS   DULoxetine 60 mg Oral BID   folic acid 1 mg Oral BID   heparin (porcine) 5,000 Units Subcutaneous Q8H [facility]   hydrALAZINE 50 mg Oral TID   insulin glargine 20 Units Subcutaneous Daily With Breakfast   insulin lispro 1-5 Units Subcutaneous HS   insulin lispro 1-6 Units Subcutaneous TID AC   levothyroxine 88 mcg Oral Early Morning   metoprolol tartrate 50 mg Oral BID   pantoprazole 40 mg Oral BID AC   pravastatin 80 mg Oral Daily   predniSONE 5 mg Oral Daily   rOPINIRole 0 25 mg Oral BID   sertraline 100 mg Oral BID   tacrolimus 3 mg Oral HS   tacrolimus 4 mg Oral Daily   traZODone 150 mg Oral HS     Continuous Infusions:   sodium bicarbonate infusion 125 mL/hr Last Rate: 125 mL/hr (11/17/17 0519)     PRN Meds:   acetaminophen    LORazepam    ondansetron    senna    zolpidem

## 2017-11-18 PROBLEM — K59.00 CONSTIPATION: Status: ACTIVE | Noted: 2017-11-18

## 2017-11-18 PROBLEM — R10.13 EPIGASTRIC PAIN: Status: RESOLVED | Noted: 2017-11-16 | Resolved: 2017-11-18

## 2017-11-18 PROBLEM — R11.2 NAUSEA AND VOMITING: Status: RESOLVED | Noted: 2017-11-16 | Resolved: 2017-11-18

## 2017-11-18 LAB
ANION GAP SERPL CALCULATED.3IONS-SCNC: 7 MMOL/L (ref 4–13)
BACTERIA UR CULT: ABNORMAL
BASOPHILS # BLD AUTO: 0.06 THOUSANDS/ΜL (ref 0–0.1)
BASOPHILS NFR BLD AUTO: 1 % (ref 0–1)
BUN SERPL-MCNC: 58 MG/DL (ref 5–25)
CA-I BLD-SCNC: 1.11 MMOL/L (ref 1.12–1.32)
CALCIUM SERPL-MCNC: 8.7 MG/DL (ref 8.3–10.1)
CHLORIDE SERPL-SCNC: 101 MMOL/L (ref 100–108)
CO2 SERPL-SCNC: 27 MMOL/L (ref 21–32)
CREAT SERPL-MCNC: 3.17 MG/DL (ref 0.6–1.3)
EOSINOPHIL # BLD AUTO: 0.16 THOUSAND/ΜL (ref 0–0.61)
EOSINOPHIL NFR BLD AUTO: 2 % (ref 0–6)
ERYTHROCYTE [DISTWIDTH] IN BLOOD BY AUTOMATED COUNT: 17.6 % (ref 11.6–15.1)
GFR SERPL CREATININE-BSD FRML MDRD: 16 ML/MIN/1.73SQ M
GLUCOSE SERPL-MCNC: 119 MG/DL (ref 65–140)
GLUCOSE SERPL-MCNC: 133 MG/DL (ref 65–140)
GLUCOSE SERPL-MCNC: 190 MG/DL (ref 65–140)
GLUCOSE SERPL-MCNC: 234 MG/DL (ref 65–140)
GLUCOSE SERPL-MCNC: 64 MG/DL (ref 65–140)
HCT VFR BLD AUTO: 33.7 % (ref 34.8–46.1)
HGB BLD-MCNC: 11.1 G/DL (ref 11.5–15.4)
LYMPHOCYTES # BLD AUTO: 2.4 THOUSANDS/ΜL (ref 0.6–4.47)
LYMPHOCYTES NFR BLD AUTO: 27 % (ref 14–44)
MCH RBC QN AUTO: 29 PG (ref 26.8–34.3)
MCHC RBC AUTO-ENTMCNC: 32.9 G/DL (ref 31.4–37.4)
MCV RBC AUTO: 88 FL (ref 82–98)
MONOCYTES # BLD AUTO: 0.65 THOUSAND/ΜL (ref 0.17–1.22)
MONOCYTES NFR BLD AUTO: 7 % (ref 4–12)
NEUTROPHILS # BLD AUTO: 5.47 THOUSANDS/ΜL (ref 1.85–7.62)
NEUTS SEG NFR BLD AUTO: 63 % (ref 43–75)
NRBC BLD AUTO-RTO: 0 /100 WBCS
PLATELET # BLD AUTO: 166 THOUSANDS/UL (ref 149–390)
PMV BLD AUTO: 12.2 FL (ref 8.9–12.7)
POTASSIUM SERPL-SCNC: 3.3 MMOL/L (ref 3.5–5.3)
RBC # BLD AUTO: 3.83 MILLION/UL (ref 3.81–5.12)
SODIUM SERPL-SCNC: 135 MMOL/L (ref 136–145)
WBC # BLD AUTO: 8.78 THOUSAND/UL (ref 4.31–10.16)

## 2017-11-18 PROCEDURE — 80048 BASIC METABOLIC PNL TOTAL CA: CPT | Performed by: INTERNAL MEDICINE

## 2017-11-18 PROCEDURE — 82948 REAGENT STRIP/BLOOD GLUCOSE: CPT

## 2017-11-18 PROCEDURE — 85025 COMPLETE CBC W/AUTO DIFF WBC: CPT | Performed by: INTERNAL MEDICINE

## 2017-11-18 PROCEDURE — 82330 ASSAY OF CALCIUM: CPT | Performed by: INTERNAL MEDICINE

## 2017-11-18 RX ORDER — POTASSIUM CHLORIDE 20 MEQ/1
40 TABLET, EXTENDED RELEASE ORAL ONCE
Status: COMPLETED | OUTPATIENT
Start: 2017-11-18 | End: 2017-11-18

## 2017-11-18 RX ORDER — SODIUM CHLORIDE 9 MG/ML
75 INJECTION, SOLUTION INTRAVENOUS CONTINUOUS
Status: DISCONTINUED | OUTPATIENT
Start: 2017-11-18 | End: 2017-11-19

## 2017-11-18 RX ORDER — LACTULOSE 20 G/30ML
20 SOLUTION ORAL 2 TIMES DAILY PRN
Status: DISCONTINUED | OUTPATIENT
Start: 2017-11-18 | End: 2017-11-20 | Stop reason: HOSPADM

## 2017-11-18 RX ORDER — POTASSIUM CHLORIDE 20 MEQ/1
20 TABLET, EXTENDED RELEASE ORAL ONCE
Status: COMPLETED | OUTPATIENT
Start: 2017-11-18 | End: 2017-11-18

## 2017-11-18 RX ORDER — CEPHALEXIN 250 MG/1
250 CAPSULE ORAL EVERY 8 HOURS SCHEDULED
Status: DISCONTINUED | OUTPATIENT
Start: 2017-11-18 | End: 2017-11-20 | Stop reason: HOSPADM

## 2017-11-18 RX ORDER — DOCUSATE SODIUM 100 MG/1
100 CAPSULE, LIQUID FILLED ORAL 2 TIMES DAILY
Status: DISCONTINUED | OUTPATIENT
Start: 2017-11-18 | End: 2017-11-20 | Stop reason: HOSPADM

## 2017-11-18 RX ADMIN — METOPROLOL TARTRATE 50 MG: 50 TABLET ORAL at 16:47

## 2017-11-18 RX ADMIN — FOLIC ACID 1 MG: 1 TABLET ORAL at 08:36

## 2017-11-18 RX ADMIN — HEPARIN SODIUM 5000 UNITS: 5000 INJECTION, SOLUTION INTRAVENOUS; SUBCUTANEOUS at 22:22

## 2017-11-18 RX ADMIN — ARIPIPRAZOLE 20 MG: 10 TABLET ORAL at 08:36

## 2017-11-18 RX ADMIN — PANTOPRAZOLE SODIUM 40 MG: 40 TABLET, DELAYED RELEASE ORAL at 16:47

## 2017-11-18 RX ADMIN — METOPROLOL TARTRATE 50 MG: 50 TABLET ORAL at 08:34

## 2017-11-18 RX ADMIN — HEPARIN SODIUM 5000 UNITS: 5000 INJECTION, SOLUTION INTRAVENOUS; SUBCUTANEOUS at 14:27

## 2017-11-18 RX ADMIN — CEPHALEXIN 250 MG: 250 CAPSULE ORAL at 22:23

## 2017-11-18 RX ADMIN — LEVOTHYROXINE SODIUM 88 MCG: 88 TABLET ORAL at 05:22

## 2017-11-18 RX ADMIN — INSULIN GLARGINE 20 UNITS: 100 INJECTION, SOLUTION SUBCUTANEOUS at 09:08

## 2017-11-18 RX ADMIN — HYDRALAZINE HYDROCHLORIDE 50 MG: 50 TABLET, FILM COATED ORAL at 16:47

## 2017-11-18 RX ADMIN — PREDNISONE 5 MG: 5 TABLET ORAL at 08:36

## 2017-11-18 RX ADMIN — ROPINIROLE 0.25 MG: 0.25 TABLET, FILM COATED ORAL at 08:36

## 2017-11-18 RX ADMIN — TRAZODONE HYDROCHLORIDE 150 MG: 100 TABLET ORAL at 22:22

## 2017-11-18 RX ADMIN — CLONIDINE HYDROCHLORIDE 0.3 MG: 0.1 TABLET ORAL at 16:48

## 2017-11-18 RX ADMIN — SERTRALINE HYDROCHLORIDE 100 MG: 100 TABLET ORAL at 08:35

## 2017-11-18 RX ADMIN — FOLIC ACID 1 MG: 1 TABLET ORAL at 16:47

## 2017-11-18 RX ADMIN — PANTOPRAZOLE SODIUM 40 MG: 40 TABLET, DELAYED RELEASE ORAL at 05:22

## 2017-11-18 RX ADMIN — DULOXETINE HYDROCHLORIDE 60 MG: 60 CAPSULE, DELAYED RELEASE ORAL at 16:47

## 2017-11-18 RX ADMIN — CEFAZOLIN SODIUM 1000 MG: 1 SOLUTION INTRAVENOUS at 10:55

## 2017-11-18 RX ADMIN — TACROLIMUS 4 MG: 1 CAPSULE ORAL at 08:35

## 2017-11-18 RX ADMIN — ROPINIROLE 0.25 MG: 0.25 TABLET, FILM COATED ORAL at 16:47

## 2017-11-18 RX ADMIN — POTASSIUM CHLORIDE 40 MEQ: 1500 TABLET, EXTENDED RELEASE ORAL at 09:09

## 2017-11-18 RX ADMIN — TACROLIMUS 3 MG: 1 CAPSULE ORAL at 22:22

## 2017-11-18 RX ADMIN — INSULIN LISPRO 3 UNITS: 100 INJECTION, SOLUTION INTRAVENOUS; SUBCUTANEOUS at 08:37

## 2017-11-18 RX ADMIN — LACTULOSE 20 G: 20 SOLUTION ORAL at 16:48

## 2017-11-18 RX ADMIN — HYDRALAZINE HYDROCHLORIDE 50 MG: 50 TABLET, FILM COATED ORAL at 08:36

## 2017-11-18 RX ADMIN — POTASSIUM CHLORIDE 20 MEQ: 1500 TABLET, EXTENDED RELEASE ORAL at 11:41

## 2017-11-18 RX ADMIN — CLONIDINE HYDROCHLORIDE 0.3 MG: 0.1 TABLET ORAL at 08:35

## 2017-11-18 RX ADMIN — DEXTROSE MONOHYDRATE 125 ML/HR: 50 INJECTION, SOLUTION INTRAVENOUS at 10:55

## 2017-11-18 RX ADMIN — AMLODIPINE BESYLATE 10 MG: 10 TABLET ORAL at 08:36

## 2017-11-18 RX ADMIN — PRAVASTATIN SODIUM 80 MG: 80 TABLET ORAL at 08:36

## 2017-11-18 RX ADMIN — DOXAZOSIN 2 MG: 2 TABLET ORAL at 22:23

## 2017-11-18 RX ADMIN — CLONIDINE HYDROCHLORIDE 0.2 MG: 0.1 TABLET ORAL at 11:41

## 2017-11-18 RX ADMIN — HYDRALAZINE HYDROCHLORIDE 50 MG: 50 TABLET, FILM COATED ORAL at 22:22

## 2017-11-18 RX ADMIN — HEPARIN SODIUM 5000 UNITS: 5000 INJECTION, SOLUTION INTRAVENOUS; SUBCUTANEOUS at 05:22

## 2017-11-18 RX ADMIN — CEPHALEXIN 250 MG: 250 CAPSULE ORAL at 16:46

## 2017-11-18 RX ADMIN — SERTRALINE HYDROCHLORIDE 100 MG: 100 TABLET ORAL at 16:48

## 2017-11-18 RX ADMIN — ASPIRIN 81 MG 81 MG: 81 TABLET ORAL at 08:36

## 2017-11-18 RX ADMIN — INSULIN LISPRO 2 UNITS: 100 INJECTION, SOLUTION INTRAVENOUS; SUBCUTANEOUS at 16:49

## 2017-11-18 RX ADMIN — DULOXETINE HYDROCHLORIDE 60 MG: 60 CAPSULE, DELAYED RELEASE ORAL at 08:35

## 2017-11-18 RX ADMIN — SODIUM CHLORIDE 75 ML/HR: 0.9 INJECTION, SOLUTION INTRAVENOUS at 19:40

## 2017-11-18 NOTE — PROGRESS NOTES
Progress Note - Nephrology   Eri Boards 47 y o  female MRN: [id number]  Unit/Bed#: [facility] 725-01 Encounter: [id number]      Assessment / Plan:  1  CKD stage 3 s/p renal transplant [facility] years ago at [facility] - b/l sCr 1 6-1 9, follows with Dr Suzan Salinas outpatient    2  Immunosuppression - continue Prograf 4 mg the morning, 3 mg at night and prednisone 5 mg daily, f/u FK level    3  EDUARDO ? Related to FK toxicity vs UTI vs hypercalcemia - sCr has uptrended in past 48 hours but clinically she is much improved  -F/u repeat BMP in am  Latest UpCr 1 09 (improved from previous as of 17)  -FK level pending  -transplant renal u/s ok  -hopeful for improvement in sCr as patient clinically improving  4  HTN - continue amlodipine 10 mg p o  daily, clonidine 0 2 mg Q 24 hours and 0 3 mg twice a day, doxazosin 2 mg daily at bedtime, hydralazine 50 mg p o  t i d , metoprolol 50 mg p o  b i d     5   Diabetes mellitus type 2-insulin per primary team    6  Possible recurrent UTI - on keflex 250mg q8hr per ID    7  Hypercalcemia - resolved and now low with bicarb gtt  D/c bicarb gtt and monitor iCal     8  Hypokalemia - K 3 3, s/p 60meq Kdur today  F/u K/mag levels in am    9  Mild hyponatremia - sNa 135, monitor this on IVF  Will stop bicarb gtt and switch to NS      10  Metabolic acidosis - improved with bicarb gtt  Bicarb 27, turning bicarb gtt off  11  Mild anemia - ? Dilutional, monitor H&H    Subjective:   She feels much better  She denies any chest pain or shortness breath, nausea, vomiting, diarrhea or dysuria  She recalls having acid reflux at home  She felt ill at home  That is all she remembers  She believes she was compliant with all her medications  Objective:     Vitals: Blood pressure (!) 187/66, pulse 66, temperature 98 3 °F (36 8 °C), temperature source Oral, resp  rate 18, height 5' 7" (1 702 m), weight 89 4 kg (197 lb 1 5 oz), SpO2 95 %  ,Body mass index is 30 87 kg/m²  Temp (24hrs), Av 1 °F (36 7 °C), Min:97 6 °F (36 4 °C), Max:98 4 °F (36 9 °C)      Weight (last 2 days)     Date/Time   Weight    11/17/17 0600  89 4 (197 09)    11/16/17 2146  89 2 (196 65)    11/16/17 1125  90 7 (200)                Intake/Output Summary (Last 24 hours) at 11/18/17 1833  Last data filed at 11/18/17 1630   Gross per 24 hour   Intake              960 ml   Output                0 ml   Net              960 ml     I/O last 24 hours: In: 3759 6 [P O :1320;  I V :2389 6; IV Piggyback:50]  Out: 0         Physical Exam:   Gen: NAD  Neck: supple  CV: +s1s2, no friction rub  Pulm: clear b/l  Abdomen: soft, allograft site NTTP  Ext: no edema  Neuro: awake, alert  Skin: warm, dry    Invasive Devices     Peripheral Intravenous Line            Peripheral IV 11/16/17 Right Hand 2 days                Medications:    Scheduled Meds:  amLODIPine 10 mg Oral Daily   ARIPiprazole 20 mg Oral Daily   aspirin 81 mg Oral Daily   cephalexin 250 mg Oral Q8H [address]   cloNIDine 0 2 mg Oral Q24H   cloNIDine 0 3 mg Oral BID   docusate sodium 100 mg Oral BID   doxazosin 2 mg Oral HS   DULoxetine 60 mg Oral BID   folic acid 1 mg Oral BID   heparin (porcine) 5,000 Units Subcutaneous Q8H [address]   hydrALAZINE 50 mg Oral TID   insulin glargine 20 Units Subcutaneous Daily With Breakfast   insulin lispro 1-5 Units Subcutaneous HS   insulin lispro 1-6 Units Subcutaneous TID AC   levothyroxine 88 mcg Oral Early Morning   metoprolol tartrate 50 mg Oral BID   pantoprazole 40 mg Oral BID AC   pravastatin 80 mg Oral Daily   predniSONE 5 mg Oral Daily   rOPINIRole 0 25 mg Oral BID   sertraline 100 mg Oral BID   tacrolimus 3 mg Oral HS   tacrolimus 4 mg Oral Daily   traZODone 150 mg Oral HS       PRN Meds:   acetaminophen    lactulose    LORazepam    ondansetron    senna    zolpidem    Continuous Infusions:  sodium bicarbonate infusion 125 mL/hr Last Rate: 125 mL/hr (11/18/17 1055)           LAB RESULTS:        Results from last 7 days  Lab Units 11/18/17  8261 11/17/17  1415 11/17/17  0519 11/16/17  1138   WBC Thousand/uL 8 78  --  8 42 9 59   HEMOGLOBIN g/dL 11 1*  --  12 9 15 1   HEMATOCRIT % 33 7*  --  38 9 45 9   PLATELETS Thousands/uL 166 180 204 200   NEUTROS PCT % 63  --  70 79*   LYMPHS PCT % 27  --  20 14   MONOS PCT % 7  --  7 5   EOS PCT % 2  --  2 1   SODIUM mmol/L 135*  --  137 137   POTASSIUM mmol/L 3 3*  --  3 9 4 8   CHLORIDE mmol/L 101  --  108 114*   CO2 mmol/L 27  --  18* 14*   BUN mg/dL 58*  --  52* 47*   CREATININE mg/dL 3 17*  --  2 62* 2 09*   CALCIUM mg/dL 8 7  --  10 0 11 1*   ALBUMIN g/dL  --   --   --  3 2*   TOTAL PROTEIN g/dL  --   --   --  10 5*   BILIRUBIN TOTAL mg/dL  --   --   --  0 28   ALK PHOS U/L  --   --   --  127*   ALT U/L  --   --   --  25   AST U/L  --   --   --  22   GLUCOSE RANDOM mg/dL 119  --  126 156*   PHOSPHORUS mg/dL  --   --  3 8  --        CUTURES:  Lab Results   Component Value Date    BLOODCX No Growth at 24 hrs  11/16/2017    BLOODCX No Growth After 5 Days  09/13/2017    BLOODCX No Growth After 5 Days  09/13/2017    BLOODCX No Growth After 5 Days  06/14/2017    BLOODCX No Growth After 5 Days  06/14/2017    BLOODCX No Growth After 5 Days  02/08/2017    BLOODCX No Growth After 5 Days  02/08/2017    URINECX >100,000 cfu/ml Escherichia coli (A) 11/16/2017    URINECX 40,000-49,000 cfu/ml  10/30/2017    URINECX 40,000-49,000 cfu/ml Escherichia coli 09/13/2017    URINECX >100,000 cfu/ml Escherichia coli 08/29/2017    URINECX 60,000-69,000 cfu/ml Escherichia coli 06/14/2017    URINECX >100,000 cfu/ml Escherichia coli 02/08/2017    URINECX No Growth <100 cfu/mL 10/13/2016                 Portions of the record may have been created with voice recognition software  Occasional wrong word or "sound a like" substitutions may have occurred due to the inherent limitations of voice recognition software  Read the chart carefully and recognize, using context, where substitutions have occurred  If you have any questions, please contact the dictating provider

## 2017-11-18 NOTE — PHYSICIAN ADVISOR
This patient is a 47 y o  y/o female who is admitted to the hospital for Vomiting (Pt reports abd pain  Family reports abd pain has been getting worse and pt lives alone and cant be trusted to take meds etc)   The patient presented to the ED on 11/16/17 at 1115 and was admitted to the hospital on 11/16/2017 1211  History of Present Illness includes: The patient had a prior admission from October 30 to November 2 due to Urinary tract infection  She has a history of a kidney and pancreatic transplant  She was treated and discharged in stable condition  The patient has been having intermittent nausea and vomiting for the past few days she has had abdominal discomfort she denied any diarrhea  The patient denied fever  Vital signs in the ER are as follows   ED Triage Vitals   Temperature Pulse Respirations Blood Pressure SpO2   11/16/17 1128 11/16/17 1125 11/16/17 1125 11/16/17 1125 11/16/17 1125   97 6 °F (36 4 °C) 77 18 134/63 97 %      Temp Source Heart Rate Source Patient Position - Orthostatic VS BP Location FiO2 (%)   11/16/17 1128 11/16/17 1125 11/16/17 1125 11/16/17 1125 --   Oral Monitor Sitting Left arm       Pain Score       11/16/17 1125       8         On exam, there is dry mucous membranes and there is a epigastric tenderness  There is also Suprapubic tenderness  Hemoglobin is 15, HCO3 is 14, creatinine is 2 09, calcium is 11 1 at a total protein is 10  5  CT scan of the abdomen and pelvis showed nodular subcentimeter densities in the second and third portion of the duodenum  This patient is being admitted with epigastric pain with an abnormal CT scan of the abdomen  The question of a possible foreign body in the duodenum  The plan of care includes intravenous PPI, G I  consultation, urinary culture,ID consultation, nephrology consultation given history of kidney transplant and acute kidney injury  The patient also has a metabolic acidosis and bicarbonate to be given intravenous fluids   This patient is appropriate for inpatient admission as her length of stay is expected to be a least two midnights   This admission is unrelated to her prior admission as she was treated and discharged in stable condition

## 2017-11-18 NOTE — PROGRESS NOTES
[facility] Internal Medicine Progress Note  Patient: Redd Mascorro 47 y o  female   MRN: [id number]  PCP: Carmenza Doran MD  Unit/Bed#: [facility] 725-01 Encounter: [id number]  Date Of Visit: 11/18/17    Assessment:    Principal Problem:    Epigastric pain  Active Problems:    UTI (urinary tract infection)    Acquired hypothyroidism    Confusion    Acute kidney injury ([facility] [address] )    Type 1 diabetes mellitus ([facility] )    Essential hypertension    Status post simultaneous kidney and pancreas transplant ([facility] )    Calculus, bladder, diverticulum    Nausea and vomiting    Stage 3 chronic kidney disease    Heartburn    Hypercalcemia    MGUS (monoclonal gammopathy of unknown significance)    Major depressive disorder    Anxiety      Plan:    · Epigastric pain with heartburn like symptoms:  · - patient also on admission with nausea and vomiting  · - CT of the abdomen revealing possibility of foreign body in the duodenum, likely dyspepsia with GERD  · - patient  · -started on Protonix b i d   · GI, consulted for evaluation:   gi notes due to lack of bloody bowel movements out any GI bleed, reviewed imaging field that hyperdense pills are identified in the gastric antrum  · - August 13 patient underwent colonoscopy which showed negative rectal hyperplastic inflammatory polyps  · - Endoscopy in 2014 demonstrated ulcerative reflux esophagitis  · - recommending repeat endoscopy during this hospitalization, per GI Dr Aniyah Kincaid   · - diet increased to ada diet today will return to clear on Monday   · - with PPI and Zofran for symptomatic treatment     ·    · Bacteruria:   · - appreciate Infectious Disease  · - id do not believe that patient's symptoms are similar at all to her prior UTIs, per ID documentation patient's father states that he does not feel that the patient is compliant with taking her medications or caring for herself well  · - however pt today more alert states that she promises she has been taking her bactrim today pt is now resistant   · Yesterday pt was taken off iv abx and transition to po bactrim but today sensitivity is resistant to Bactrim  ·    · Acute on chronic CKD stage 3:  · - appreciate Nephrology  · - baseline creatinine of 0 6-1 9  · - today creatinine 3 17 increased from yesterday and continues to increase from day prior,   · - patient seen outpatient by Dr Tomas De La Vega  · - patient has history of renal transplant about 20 years ago at [facility] on tacrolimus and prednisone  · In last 24 hours patient with increasingly worse renal function, nephrology suspect secondary to infection/pre renal hypercalcemia with possible Prograf toxicity  · - obtain Prograf level obtained yesterday but still with no result  · - PTH intact level pending PTH, in process  · - per Nephrology patient will need close follow-up with Hematology as an outpatient given question of of MGUS versus myeloma especially in the setting of hypercalcemia elevated total protein and rising light chains  · - recommendationed yesterday to initiate bicarbonate and IV fluids,   ·    · Chills and suprapubic tenderness:  · - patient is seen by Urology , discussed with Betsy GOVEA  · - feel urine culture likely colonization do not feel abdominal pain secondary to urinary issue  · - today patient has no complaints of abdominal pain  · - urology reviewed imaging and felt CT scan of abdomen and pelvis unremarkable with atrophic appearing native kidney and normal appearing transplant kidney    · Recommendations are to await urine culture  ·    · Hypercalcemia, possibly due to dehydration,  Also with MGUS:   · - appreciate nephro will need to follow up with hematology at discharge   · - ionized calcium 1 36 yesterday   ·    · History of kidney and pancreatic transplant:  · - on immunosuppressant medications  · - lab levels pending, no results as of yet   ·    · Type 1 diabetes with hyperglycemia:  · - continue patient's home medications with Lantus  · - sliding scale added/diet appropriate  · - today at lunch time bs 64,   ·   · Hypokalemia:   · - repleted by nephro today,   · - trend labs   ·    · Major depressive disorder and anxiety:  · - continue psychiatric medications  · - continue psychiatric medication  · - if any concerns would consult Psychiatry  · - patient's father is concerned about patient's mental status, will observe  ·    · Confusion:  · - likely secondary to acute toxic metabolic encephalopathy secondary to acute kidney injury with metabolic acidosis  · - patient was lethargic yesterday, but patient is arousable and alert today feeling " a lot better"         VTE Pharmacologic Prophylaxis:   Pharmacologic: Heparin  Mechanical VTE Prophylaxis in Place: Yes    Patient Centered Rounds: I have performed bedside rounds with nursing staff today  Discussions with Specialists or Other Care Team Provider: nursing     Education and Discussions with Family / Patient: patient     Time Spent for Care: 30 minutes  More than 50% of total time spent on counseling and coordination of care as described above  Current Length of Stay: 2 day(s)    Current Patient Status: Inpatient   Certification Statement: The patient will continue to require additional inpatient hospital stay due to plan for egd monday and ongoing monitoring     Discharge Plan / Estimated Discharge Date: discharge, home when medically stable     Code Status: Level 1 - Full Code      Subjective:    Patient is asleep upon entry to room  Aroused easily  Denies any pain  States she feels really good today  No nausea or vomiting  In fact feeling rather constipated  No fevers or chills    Objective:     Vitals:   Temp (24hrs), Av 8 °F (36 6 °C), Min:97 5 °F (36 4 °C), Max:98 4 °F (36 9 °C)    HR:  [60-74] 66  Resp:  [14-18] 18  BP: (116-144)/(51-57) 143/51  SpO2:  [95 %-98 %] 98 %  Body mass index is 30 87 kg/m²  Input and Output Summary (last 24 hours):        Intake/Output Summary (Last 24 hours) at 11/18/17 1511  Last data filed at 11/18/17 1500   Gross per 24 hour   Intake              900 ml   Output                0 ml   Net              900 ml       Physical Exam:     Physical Exam   Constitutional: She is oriented to person, place, and time  She appears well-developed and well-nourished  No distress  HENT:   Head: Normocephalic and atraumatic  Eyes: Conjunctivae and EOM are normal  Pupils are equal, round, and reactive to light  Right eye exhibits no discharge  Left eye exhibits no discharge  No scleral icterus  Neck: No JVD present  No tracheal deviation present  No thyromegaly present  Cardiovascular: Normal rate and regular rhythm  Exam reveals no gallop and no friction rub  No murmur heard  Pulmonary/Chest: No stridor  No respiratory distress  She has no wheezes  She has rales (Right lower lobe with some fine crackles)  She exhibits no tenderness  Abdominal: Soft  She exhibits no distension and no mass  There is no tenderness  There is no rebound and no guarding  Musculoskeletal: She exhibits no edema, tenderness or deformity  Lymphadenopathy:     She has no cervical adenopathy  Neurological: She is oriented to person, place, and time  Skin: No rash noted  She is not diaphoretic  No erythema  No pallor  Psychiatric: She has a normal mood and affect           Additional Data:     Labs:      Results from last 7 days  Lab Units 11/18/17  0554   WBC Thousand/uL 8 78   HEMOGLOBIN g/dL 11 1*   HEMATOCRIT % 33 7*   PLATELETS Thousands/uL 166   NEUTROS PCT % 63   LYMPHS PCT % 27   MONOS PCT % 7   EOS PCT % 2       Results from last 7 days  Lab Units 11/18/17  0554  11/16/17  1138   SODIUM mmol/L 135*  < > 137   POTASSIUM mmol/L 3 3*  < > 4 8   CHLORIDE mmol/L 101  < > 114*   CO2 mmol/L 27  < > 14*   BUN mg/dL 58*  < > 47*   CREATININE mg/dL 3 17*  < > 2 09*   CALCIUM mg/dL 8 7  < > 11 1*   TOTAL PROTEIN g/dL  --   --  10 5*   BILIRUBIN TOTAL mg/dL  --   --  0 28   ALK PHOS U/L  --   -- 127*   ALT U/L  --   --  25   AST U/L  --   --  22   GLUCOSE RANDOM mg/dL 119  < > 156*   < > = values in this interval not displayed  * I Have Reviewed All Lab Data Listed Above  * Additional Pertinent Lab Tests Reviewed: All Labs Within Last 24 Hours Reviewed    Imaging:    Imaging Reports Reviewed Today Include: reviewed      Recent Cultures (last 7 days):       Results from last 7 days  Lab Units 11/16/17 2043 11/16/17  1803   BLOOD CULTURE  No Growth at 24 hrs   --    URINE CULTURE   --  >100,000 cfu/ml Escherichia coli*       Last 24 Hours Medication List:     amLODIPine 10 mg Oral Daily   ARIPiprazole 20 mg Oral Daily   aspirin 81 mg Oral Daily   cephalexin 250 mg Oral Q8H [address]   cloNIDine 0 2 mg Oral Q24H   cloNIDine 0 3 mg Oral BID   doxazosin 2 mg Oral HS   DULoxetine 60 mg Oral BID   folic acid 1 mg Oral BID   heparin (porcine) 5,000 Units Subcutaneous Q8H [address]   hydrALAZINE 50 mg Oral TID   insulin glargine 20 Units Subcutaneous Daily With Breakfast   insulin lispro 1-5 Units Subcutaneous HS   insulin lispro 1-6 Units Subcutaneous TID AC   levothyroxine 88 mcg Oral Early Morning   metoprolol tartrate 50 mg Oral BID   pantoprazole 40 mg Oral BID AC   pravastatin 80 mg Oral Daily   predniSONE 5 mg Oral Daily   rOPINIRole 0 25 mg Oral BID   sertraline 100 mg Oral BID   tacrolimus 3 mg Oral HS   tacrolimus 4 mg Oral Daily   traZODone 150 mg Oral HS        Today, Patient Was Seen By: MER Lehman    ** Please Note: This note has been constructed using a voice recognition system   **

## 2017-11-18 NOTE — PROGRESS NOTES
Progress Note - Infectious Disease   Redd Mascorro 47 y o  female MRN: [id number]  Unit/Bed#: [facility] 725-01 Encounter: [id number]      Impression:  1   E coli bacteriuria with possible recurrent UTI with bladder calculi and diverticula  2  Type 1 diabetes mellitus with nephropathy, retinopathy, neuropathy, and PAD, S/P renal transplant and failed pancreatic transplant  3  S/P left transmetatarsal and right hallux amputations  4  CKD secondary to 2  Recommendations:  1  Patient's E coli is resistant to trimethoprim sulfamethoxazole but susceptible to cefazolin  Will change cefazolin to cephalexin 250 mg q 8 hours p o  Antibiotics:  1  Cephalexin 250 mg q 8 hours P O , day 3  Total antibiotics    Subjective: The patient has no complaints  Says she feels much better today  Specifically denies abdominal pain, back pain, and dysuria  Denies fevers, chills, or sweats  Denies nausea, vomiting, or diarrhea  Objective:  Vitals:  HR:  [60-74] 66  Resp:  [14-18] 18  BP: (116-144)/(51-57) 143/51  SpO2:  [95 %-98 %] 98 %  Temp (24hrs), Av 8 °F (36 6 °C), Min:97 5 °F (36 4 °C), Max:98 4 °F (36 9 °C)  Current: Temperature: 97 6 °F (36 4 °C)    Physical Exam:     General Appearance:  Alert,  but arousable and responsive nontoxic, no acute distress  Throat: Oropharynx moist without lesions  Lips, mucosa, and tongue normal   Neck: Supple, symmetrical, trachea midline, no adenopathy,  no tenderness/mass/nodules   Lungs:   Clear to auscultation bilaterally, no audible wheezes, rhonchi or rales; respirations unlabored   Heart:  Regular rate and rhythm, S1, S2 normal, no murmur, rub or gallop   Abdomen:   Soft, non-tender, non-distended, positive bowel sounds    No masses, no organomegaly, surgical scars    No CVA tenderness, surgical scars, palpable transplant kidney   Extremities: Extremities normal, atraumatic, no clubbing, cyanosis or edema, S/P left transmetatarsal and right hallux amputations, decreased pulses   Skin: Skin color, texture, turgor normal, no rashes or lesions  No draining wounds noted           Invasive Devices     Peripheral Intravenous Line            Peripheral IV 11/16/17 Right Hand 2 days                Labs, Imaging, & Other studies:   All pertinent labs were personally reviewed    Results from last 7 days  Lab Units 11/18/17  0554 11/17/17  1415 11/17/17  0519 11/16/17  1138   WBC Thousand/uL 8 78  --  8 42 9 59   HEMOGLOBIN g/dL 11 1*  --  12 9 15 1   PLATELETS Thousands/uL 166 180 204 200       Results from last 7 days  Lab Units 11/18/17  0554 11/17/17  0519 11/16/17  1138   SODIUM mmol/L 135* 137 137   POTASSIUM mmol/L 3 3* 3 9 4 8   CHLORIDE mmol/L 101 108 114*   CO2 mmol/L 27 18* 14*   ANION GAP mmol/L 7 11 9   BUN mg/dL 58* 52* 47*   CREATININE mg/dL 3 17* 2 62* 2 09*   EGFR ml/min/1 73sq m 16 20 26   GLUCOSE RANDOM mg/dL 119 126 156*   CALCIUM mg/dL 8 7 10 0 11 1*   AST U/L  --   --  22   ALT U/L  --   --  25   ALK PHOS U/L  --   --  127*   TOTAL PROTEIN g/dL  --   --  10 5*   ALBUMIN g/dL  --   --  3 2*   BILIRUBIN TOTAL mg/dL  --   --  0 28       Results from last 7 days  Lab Units 11/16/17  2043 11/16/17  1803   BLOOD CULTURE  No Growth at 24 hrs   --    URINE CULTURE   --  >100,000 cfu/ml Escherichia coli*

## 2017-11-18 NOTE — PLAN OF CARE
DISCHARGE PLANNING     Discharge to home or other facility with appropriate resources Progressing        DISCHARGE PLANNING - CARE MANAGEMENT     Discharge to post-acute care or home with appropriate resources Progressing        INFECTION - ADULT     Absence or prevention of progression during hospitalization Progressing        Knowledge Deficit     Patient/family/caregiver demonstrates understanding of disease process, treatment plan, medications, and discharge instructions Progressing        METABOLIC, FLUID AND ELECTROLYTES - ADULT     Electrolytes maintained within normal limits Progressing     Glucose maintained within target range Progressing        PAIN - ADULT     Verbalizes/displays adequate comfort level or baseline comfort level Progressing        Potential for Falls     Patient will remain free of falls Progressing        SAFETY ADULT     Maintain or return to baseline ADL function Progressing     Maintain or return mobility status to optimal level Progressing details PERRLA/No oral lesions/Anicteric conjunctivae/Extra occular movements intact

## 2017-11-19 LAB
ANION GAP SERPL CALCULATED.3IONS-SCNC: 6 MMOL/L (ref 4–13)
BASOPHILS # BLD AUTO: 0.06 THOUSANDS/ΜL (ref 0–0.1)
BASOPHILS NFR BLD AUTO: 1 % (ref 0–1)
BUN SERPL-MCNC: 51 MG/DL (ref 5–25)
CA-I BLD-SCNC: 1.11 MMOL/L (ref 1.12–1.32)
CALCIUM SERPL-MCNC: 8.4 MG/DL (ref 8.3–10.1)
CHLORIDE SERPL-SCNC: 103 MMOL/L (ref 100–108)
CO2 SERPL-SCNC: 31 MMOL/L (ref 21–32)
CREAT SERPL-MCNC: 2.11 MG/DL (ref 0.6–1.3)
EOSINOPHIL # BLD AUTO: 0.21 THOUSAND/ΜL (ref 0–0.61)
EOSINOPHIL NFR BLD AUTO: 2 % (ref 0–6)
ERYTHROCYTE [DISTWIDTH] IN BLOOD BY AUTOMATED COUNT: 17.2 % (ref 11.6–15.1)
GFR SERPL CREATININE-BSD FRML MDRD: 26 ML/MIN/1.73SQ M
GLUCOSE SERPL-MCNC: 106 MG/DL (ref 65–140)
GLUCOSE SERPL-MCNC: 136 MG/DL (ref 65–140)
GLUCOSE SERPL-MCNC: 160 MG/DL (ref 65–140)
GLUCOSE SERPL-MCNC: 86 MG/DL (ref 65–140)
GLUCOSE SERPL-MCNC: 92 MG/DL (ref 65–140)
HCT VFR BLD AUTO: 34.3 % (ref 34.8–46.1)
HGB BLD-MCNC: 11.1 G/DL (ref 11.5–15.4)
LYMPHOCYTES # BLD AUTO: 1.87 THOUSANDS/ΜL (ref 0.6–4.47)
LYMPHOCYTES NFR BLD AUTO: 20 % (ref 14–44)
MAGNESIUM SERPL-MCNC: 2.7 MG/DL (ref 1.6–2.6)
MCH RBC QN AUTO: 29 PG (ref 26.8–34.3)
MCHC RBC AUTO-ENTMCNC: 32.4 G/DL (ref 31.4–37.4)
MCV RBC AUTO: 90 FL (ref 82–98)
MONOCYTES # BLD AUTO: 0.64 THOUSAND/ΜL (ref 0.17–1.22)
MONOCYTES NFR BLD AUTO: 7 % (ref 4–12)
NEUTROPHILS # BLD AUTO: 6.44 THOUSANDS/ΜL (ref 1.85–7.62)
NEUTS SEG NFR BLD AUTO: 70 % (ref 43–75)
NRBC BLD AUTO-RTO: 0 /100 WBCS
PLATELET # BLD AUTO: 171 THOUSANDS/UL (ref 149–390)
PMV BLD AUTO: 13.1 FL (ref 8.9–12.7)
POTASSIUM SERPL-SCNC: 4 MMOL/L (ref 3.5–5.3)
RBC # BLD AUTO: 3.83 MILLION/UL (ref 3.81–5.12)
SODIUM SERPL-SCNC: 140 MMOL/L (ref 136–145)
TACROLIMUS BLD LC/MS/MS-MCNC: 24.3 NG/ML (ref 2–20)
WBC # BLD AUTO: 9.27 THOUSAND/UL (ref 4.31–10.16)

## 2017-11-19 PROCEDURE — 80048 BASIC METABOLIC PNL TOTAL CA: CPT | Performed by: NURSE PRACTITIONER

## 2017-11-19 PROCEDURE — 85025 COMPLETE CBC W/AUTO DIFF WBC: CPT | Performed by: INTERNAL MEDICINE

## 2017-11-19 PROCEDURE — 82330 ASSAY OF CALCIUM: CPT | Performed by: INTERNAL MEDICINE

## 2017-11-19 PROCEDURE — 82948 REAGENT STRIP/BLOOD GLUCOSE: CPT

## 2017-11-19 PROCEDURE — 83735 ASSAY OF MAGNESIUM: CPT | Performed by: INTERNAL MEDICINE

## 2017-11-19 RX ADMIN — ARIPIPRAZOLE 20 MG: 10 TABLET ORAL at 08:20

## 2017-11-19 RX ADMIN — TACROLIMUS 4 MG: 1 CAPSULE ORAL at 08:21

## 2017-11-19 RX ADMIN — PANTOPRAZOLE SODIUM 40 MG: 40 TABLET, DELAYED RELEASE ORAL at 08:22

## 2017-11-19 RX ADMIN — CEPHALEXIN 250 MG: 250 CAPSULE ORAL at 21:52

## 2017-11-19 RX ADMIN — DULOXETINE HYDROCHLORIDE 60 MG: 60 CAPSULE, DELAYED RELEASE ORAL at 08:21

## 2017-11-19 RX ADMIN — PREDNISONE 5 MG: 5 TABLET ORAL at 08:22

## 2017-11-19 RX ADMIN — ASPIRIN 81 MG 81 MG: 81 TABLET ORAL at 08:23

## 2017-11-19 RX ADMIN — DOXAZOSIN 2 MG: 2 TABLET ORAL at 21:52

## 2017-11-19 RX ADMIN — CEPHALEXIN 250 MG: 250 CAPSULE ORAL at 14:24

## 2017-11-19 RX ADMIN — TRAZODONE HYDROCHLORIDE 150 MG: 100 TABLET ORAL at 21:51

## 2017-11-19 RX ADMIN — CEPHALEXIN 250 MG: 250 CAPSULE ORAL at 06:24

## 2017-11-19 RX ADMIN — HEPARIN SODIUM 5000 UNITS: 5000 INJECTION, SOLUTION INTRAVENOUS; SUBCUTANEOUS at 06:24

## 2017-11-19 RX ADMIN — PRAVASTATIN SODIUM 80 MG: 80 TABLET ORAL at 08:21

## 2017-11-19 RX ADMIN — CLONIDINE HYDROCHLORIDE 0.3 MG: 0.1 TABLET ORAL at 16:56

## 2017-11-19 RX ADMIN — CLONIDINE HYDROCHLORIDE 0.2 MG: 0.1 TABLET ORAL at 12:25

## 2017-11-19 RX ADMIN — ROPINIROLE 0.25 MG: 0.25 TABLET, FILM COATED ORAL at 08:21

## 2017-11-19 RX ADMIN — SERTRALINE HYDROCHLORIDE 100 MG: 100 TABLET ORAL at 08:22

## 2017-11-19 RX ADMIN — HYDRALAZINE HYDROCHLORIDE 50 MG: 50 TABLET, FILM COATED ORAL at 16:57

## 2017-11-19 RX ADMIN — CLONIDINE HYDROCHLORIDE 0.3 MG: 0.1 TABLET ORAL at 08:21

## 2017-11-19 RX ADMIN — DOCUSATE SODIUM 100 MG: 100 CAPSULE, LIQUID FILLED ORAL at 08:22

## 2017-11-19 RX ADMIN — FOLIC ACID 1 MG: 1 TABLET ORAL at 08:23

## 2017-11-19 RX ADMIN — DULOXETINE HYDROCHLORIDE 60 MG: 60 CAPSULE, DELAYED RELEASE ORAL at 16:55

## 2017-11-19 RX ADMIN — FOLIC ACID 1 MG: 1 TABLET ORAL at 16:56

## 2017-11-19 RX ADMIN — METOPROLOL TARTRATE 50 MG: 50 TABLET ORAL at 08:22

## 2017-11-19 RX ADMIN — METOPROLOL TARTRATE 50 MG: 50 TABLET ORAL at 16:56

## 2017-11-19 RX ADMIN — LORAZEPAM 2 MG: 1 TABLET ORAL at 16:57

## 2017-11-19 RX ADMIN — HYDRALAZINE HYDROCHLORIDE 50 MG: 50 TABLET, FILM COATED ORAL at 21:51

## 2017-11-19 RX ADMIN — INSULIN LISPRO 1 UNITS: 100 INJECTION, SOLUTION INTRAVENOUS; SUBCUTANEOUS at 23:22

## 2017-11-19 RX ADMIN — HEPARIN SODIUM 5000 UNITS: 5000 INJECTION, SOLUTION INTRAVENOUS; SUBCUTANEOUS at 21:51

## 2017-11-19 RX ADMIN — TACROLIMUS 3 MG: 1 CAPSULE ORAL at 21:51

## 2017-11-19 RX ADMIN — SERTRALINE HYDROCHLORIDE 100 MG: 100 TABLET ORAL at 16:55

## 2017-11-19 RX ADMIN — LEVOTHYROXINE SODIUM 88 MCG: 88 TABLET ORAL at 06:24

## 2017-11-19 RX ADMIN — PANTOPRAZOLE SODIUM 40 MG: 40 TABLET, DELAYED RELEASE ORAL at 16:56

## 2017-11-19 RX ADMIN — INSULIN GLARGINE 20 UNITS: 100 INJECTION, SOLUTION SUBCUTANEOUS at 08:27

## 2017-11-19 RX ADMIN — AMLODIPINE BESYLATE 10 MG: 10 TABLET ORAL at 08:23

## 2017-11-19 RX ADMIN — HYDRALAZINE HYDROCHLORIDE 50 MG: 50 TABLET, FILM COATED ORAL at 08:22

## 2017-11-19 RX ADMIN — DOCUSATE SODIUM 100 MG: 100 CAPSULE, LIQUID FILLED ORAL at 16:56

## 2017-11-19 RX ADMIN — HEPARIN SODIUM 5000 UNITS: 5000 INJECTION, SOLUTION INTRAVENOUS; SUBCUTANEOUS at 14:24

## 2017-11-19 RX ADMIN — ROPINIROLE 0.25 MG: 0.25 TABLET, FILM COATED ORAL at 16:56

## 2017-11-19 NOTE — PROGRESS NOTES
[facility] Internal Medicine Progress Note  Patient: Ehsan December 47 y o  female   MRN: [id number]  PCP: Aristides Benitez MD  Unit/Bed#: [facility] 725-01 Encounter: [id number]  Date Of Visit: 11/19/17    Assessment:    Active Problems:    Acquired hypothyroidism    Confusion    Acute kidney injury ([facility] [address] )    Type 1 diabetes mellitus ([facility] )    Essential hypertension    Status post simultaneous kidney and pancreas transplant ([facility] )    Calculus, bladder, diverticulum    Stage 3 chronic kidney disease    Heartburn    Hypercalcemia    MGUS (monoclonal gammopathy of unknown significance)    Major depressive disorder    Anxiety    Constipation      Plan:    · Epigastric pain with heartburn like symptoms:  · - patient on admission with nausea and vomiting  · - CT of the abdomen revealing possibility of foreign body in the duodenum, likely dyspepsia with GERD  · -started on Protonix b i d   · GI, consulted for evaluation:   gi notes due to lack of bloody bowel movements out any GI bleed, reviewed imaging field that hyperdense pills are identified in the gastric antrum  · - August 13 patient underwent colonoscopy which showed negative rectal hyperplastic inflammatory polyps  · - Endoscopy in 2014 demonstrated ulcerative reflux esophagitis  · - recommending repeat endoscopy during this hospitalization, per GI Dr Sherren Guadalajara   · - diet increased to ada diet yesterday  will return to npo after midnight   · - with PPI and Zofran for symptomatic treatment  · - egd tomorrow, discussed with gi they have her on the schedule   · - nephrology still feel that patient should have EGD tomorrow, secondary to patient's presentation with elevated creatinine and single kidney    Do not want patient to repeat and return with further dehydration secondary to epigastric issues  ·    · Bacteruria:   · - appreciate Infectious Disease  · - id do not believe that patient's symptoms are similar at all to her prior UTIs, per ID documentation patient's father states that he does not feel that the patient is compliant with taking her medications or caring for herself well  · - however pt today more alert states that she promises she has been taking her bactrim today pt is now resistant   · saturday pt was taken off iv abx and transition to po bactrim however,  sensitivity is resistant to Bactrim pt was now initiated on keflex   ·    · Acute on chronic CKD stage 3:  · - appreciate Nephrology  · - baseline creatinine of 0 6-1 9  · - today creatinine with great drop 2 11 from 3 17 Saturday ,   · - patient seen outpatient by Dr Jorge Lombardi  · - patient has history of renal [facility] about [facility]rol[facility] and prednisone  · In last 24 hours patient with increasingly worse renal function, nephrology suspect secondary to infection/pre renal hypercalcemia with possible Prograf toxicity  · - obtain Prograf level obtained yesterday but still with no result  · - PTH intact level pending PTH, in process  · - per Nephrology patient will need close follow-up with Hematology as an outpatient given question of of MGUS versus myeloma especially in the setting of hypercalcemia elevated total protein and rising light chains  · - bicarbonate and IV fluids, were administered and now will be discontinued per renal   ·    · Chills and suprapubic tenderness:  · - patient is seen by Urology , discussed with Semaj/IKE GOVEA  · - feel urine culture likely colonization do not feel abdominal pain secondary to urinary issue  · - today patient has no complaints of abdominal pain  · - urology reviewed imaging and felt CT scan of abdomen and pelvis unremarkable with atrophic appearing native kidney and normal appearing transplant kidney    · Recommendations  ·    · Hypercalcemia, possibly due to dehydration,  Also with MGUS:   · - appreciate nephro will need to follow up with hematology at discharge     ·    · History of kidney and pancreatic transplant:  · - on immunosuppressant medications  · - lab levels pending, no results as of yet   ·    · Type 1 diabetes with hyperglycemia:  · - continue patient's home medications with Lantus  · - sliding scale added/diet appropriate  · - today  Fasting 92  ·    · Hypokalemia:   · - repleted by nephro today,   · - trend labs   ·    · Major depressive disorder and anxiety:  · - continue psychiatric medications  · - continue psychiatric medication  · - if any concerns would consult Psychiatry  · - patient's father is concerned about patient's mental status, will observe  ·    · Confusion:  · - likely secondary to acute toxic metabolic encephalopathy secondary to acute kidney injury with metabolic acidosis  · - patient was lethargic friday but patient is arousable and alert today feeling " a lot better"            VTE Pharmacologic Prophylaxis:   Pharmacologic: Heparin  Mechanical VTE Prophylaxis in Place: Yes    Patient Centered Rounds: I have performed bedside rounds with nursing staff today  Discussions with Specialists or Other Care Team Provider: nursing     Education and Discussions with Family / Patient: patient     Time Spent for Care: 30 minutes  More than 50% of total time spent on counseling and coordination of care as described above  Current Length of Stay: 3 day(s)    Current Patient Status: Inpatient   Certification Statement: The patient will continue to require additional inpatient hospital stay due to plan for egd tomorrow     Discharge Plan / Estimated Discharge Date: home tomorrow after egd if no severe findings     Code Status: Level 1 - Full Code      Subjective:   Patient is sleeping  But arousable converses with me remembers me from yesterday  Patient states that she is happy and everything is looking better  She denies any symptoms at this point  Denies any chest pain shortness of breath palpitations  Denies any abdominal pain nausea or vomiting      Objective:     Vitals:   Temp (24hrs), Av 3 °F (36 8 °C), Min:98 1 °F (36 7 °C), Max:98 4 °F (36 9 °C)    HR:  [62-66] 63  Resp:  [18] 18  BP: (133-187)/(51-74) 160/62  SpO2:  [93 %-99 %] 98 %  Body mass index is 33 32 kg/m²  Input and Output Summary (last 24 hours): Intake/Output Summary (Last 24 hours) at 11/19/17 1114  Last data filed at 11/19/17 1033   Gross per 24 hour   Intake             2150 ml   Output             1050 ml   Net             1100 ml       Physical Exam:     Physical Exam   Constitutional: She is oriented to person, place, and time  She appears well-developed and well-nourished  No distress  HENT:   Head: Normocephalic and atraumatic  Eyes: Pupils are equal, round, and reactive to light  Neck: Normal range of motion  No JVD present  No tracheal deviation present  No thyromegaly present  Cardiovascular: Normal rate  Exam reveals no gallop and no friction rub  Murmur heard  Pulmonary/Chest: No stridor  No respiratory distress  She has no wheezes  She has no rales  She exhibits no tenderness  Abdominal: Soft  Bowel sounds are normal  She exhibits no distension and no mass  There is no tenderness  There is no rebound and no guarding  Musculoskeletal: She exhibits no edema, tenderness or deformity  Lymphadenopathy:     She has no cervical adenopathy  Neurological: She is oriented to person, place, and time  Skin: Skin is warm  No rash noted  She is not diaphoretic  No erythema  No pallor  Psychiatric: She has a normal mood and affect               Additional Data:     Labs:      Results from last 7 days  Lab Units 11/19/17  0646   WBC Thousand/uL 9 27   HEMOGLOBIN g/dL 11 1*   HEMATOCRIT % 34 3*   PLATELETS Thousands/uL 171   NEUTROS PCT % 70   LYMPHS PCT % 20   MONOS PCT % 7   EOS PCT % 2       Results from last 7 days  Lab Units 11/19/17  0646  11/16/17  1138   SODIUM mmol/L 140  < > 137   POTASSIUM mmol/L 4 0  < > 4 8   CHLORIDE mmol/L 103  < > 114*   CO2 mmol/L 31  < > 14*   BUN mg/dL 51*  < > 47*   CREATININE mg/dL 2 11*  < > 2 09*   CALCIUM mg/dL 8 4  < > 11 1*   TOTAL PROTEIN g/dL  --   --  10 5*   BILIRUBIN TOTAL mg/dL  --   --  0 28   ALK PHOS U/L  --   --  127*   ALT U/L  --   --  25   AST U/L  --   --  22   GLUCOSE RANDOM mg/dL 86  < > 156*   < > = values in this interval not displayed  * I Have Reviewed All Lab Data Listed Above  * Additional Pertinent Lab Tests Reviewed: All Labs Within Last 24 Hours Reviewed    Imaging:    Imaging Reports Reviewed Today Include: reviewed       Recent Cultures (last 7 days):       Results from last 7 days  Lab Units 11/16/17 2043 11/16/17  1803   BLOOD CULTURE  No Growth at 48 hrs  --    URINE CULTURE   --  >100,000 cfu/ml Escherichia coli*       Last 24 Hours Medication List:     amLODIPine 10 mg Oral Daily   ARIPiprazole 20 mg Oral Daily   aspirin 81 mg Oral Daily   cephalexin 250 mg Oral Q8H [address]   cloNIDine 0 2 mg Oral Q24H   cloNIDine 0 3 mg Oral BID   docusate sodium 100 mg Oral BID   doxazosin 2 mg Oral HS   DULoxetine 60 mg Oral BID   folic acid 1 mg Oral BID   heparin (porcine) 5,000 Units Subcutaneous Q8H [address]   hydrALAZINE 50 mg Oral TID   insulin glargine 20 Units Subcutaneous Daily With Breakfast   insulin lispro 1-5 Units Subcutaneous HS   insulin lispro 1-6 Units Subcutaneous TID AC   levothyroxine 88 mcg Oral Early Morning   metoprolol tartrate 50 mg Oral BID   pantoprazole 40 mg Oral BID AC   pravastatin 80 mg Oral Daily   predniSONE 5 mg Oral Daily   rOPINIRole 0 25 mg Oral BID   sertraline 100 mg Oral BID   tacrolimus 3 mg Oral HS   tacrolimus 4 mg Oral Daily   traZODone 150 mg Oral HS        Today, Patient Was Seen By: MER Benson    ** Please Note: This note has been constructed using a voice recognition system   **

## 2017-11-19 NOTE — PROGRESS NOTES
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  Recommendations:  E coli bacteriuria  1  Continue cephalexin 250 mg q 8 hours p o  Epigastric pain with heartburn  1  For EGD tomorrow    Antibiotics:  1  Cephalexin 250 mg q 8 hours P O , day 4 of 7  Total antibiotics    Subjective: The patient continues to deny any pain or dysuria  Denies fevers, chills, or sweats  Denies nausea, vomiting, or diarrhea  Objective:  Vitals:  HR:  [62-68] 68  Resp:  [18] 18  BP: (133-193)/(62-74) 193/62  SpO2:  [93 %-99 %] 96 %  Temp (24hrs), Av 1 °F (36 7 °C), Min:97 8 °F (36 6 °C), Max:98 4 °F (36 9 °C)  Current: Temperature: 97 8 °F (36 6 °C)    Physical Exam:     General Appearance:  Alert,  and responsive nontoxic, no acute distress  Throat: Oropharynx moist without lesions  Lips, mucosa, and tongue normal   Lungs:   Clear to auscultation bilaterally, no audible wheezes, rhonchi or rales; respirations unlabored   Heart:  Regular rate and rhythm, S1, S2 normal, no murmur, rub or gallop   Abdomen:   Soft, non-tender, non-distended, positive bowel sounds  No masses, no organomegaly, surgical scars    No CVA tenderness, surgical scars, palpable transplant kidney   Extremities: Extremities normal, atraumatic, no clubbing, cyanosis or edema, S/P left transmetatarsal and right hallux amputations, decreased pulses   Skin: Skin color, texture, turgor normal, no rashes or lesions  No draining wounds noted           Invasive Devices     Peripheral Intravenous Line            Peripheral IV 17 Right Hand 3 days Labs, Imaging, & Other studies:   All pertinent labs were personally reviewed    Results from last 7 days  Lab Units 11/19/17  0646 11/18/17  0554 11/17/17  1415 11/17/17  0519   WBC Thousand/uL 9 27 8 78  --  8 42   HEMOGLOBIN g/dL 11 1* 11 1*  --  12 9   PLATELETS Thousands/uL 171 166 180 204       Results from last 7 days  Lab Units 11/19/17  0646 11/18/17  0554 11/17/17  0519 11/16/17  1138   SODIUM mmol/L 140 135* 137 137   POTASSIUM mmol/L 4 0 3 3* 3 9 4 8   CHLORIDE mmol/L 103 101 108 114*   CO2 mmol/L 31 27 18* 14*   ANION GAP mmol/L 6 7 11 9   BUN mg/dL 51* 58* 52* 47*   CREATININE mg/dL 2 11* 3 17* 2 62* 2 09*   EGFR ml/min/1 73sq m 26 16 20 26   GLUCOSE RANDOM mg/dL 86 119 126 156*   CALCIUM mg/dL 8 4 8 7 10 0 11 1*   AST U/L  --   --   --  22   ALT U/L  --   --   --  25   ALK PHOS U/L  --   --   --  127*   TOTAL PROTEIN g/dL  --   --   --  10 5*   ALBUMIN g/dL  --   --   --  3 2*   BILIRUBIN TOTAL mg/dL  --   --   --  0 28       Results from last 7 days  Lab Units 11/16/17  2043 11/16/17  1803   BLOOD CULTURE  No Growth at 48 hrs    --    URINE CULTURE   --  >100,000 cfu/ml Escherichia coli*

## 2017-11-19 NOTE — PROGRESS NOTES
Progress Note - Nephrology   Redd Mascorro 47 y o  female MRN: [id number]  [facility] Encounter: [id number]      Assessment / Plan:  1  CKD stage 3 s/p renal transplant 20 years ago at [address] - b/l sCr 1 6-1 9, follows with Dr Brian Guillermo outpatient, sCr almost back to baseline     2  Immunosuppression - continue Prograf 4 mg the morning, 3 mg at night and prednisone 5 mg daily, f/u FK level     3  EDUARDO ? Related to UTI vs hypercalcemia - sCr had peaked at 3 1 but back down today to 2 1  -FK level pending  -transplant renal u/s ok  -calcium low now     4  HTN - continue amlodipine 10 mg p o  daily, clonidine 0 2 mg Q 24 hours and 0 3 mg twice a day, doxazosin 2 mg daily at bedtime, hydralazine 50 mg p o  t i d , metoprolol 50 mg p o  b i d      5   Diabetes mellitus type 2-insulin per primary team     6  Possible recurrent UTI - on keflex 250mg q8hr per ID, E Coli in urine resistant to bactrim       7  Hypocalcemia - monitor off bicarb gtt, iCal 1 11     8  Hypermagnesemia - avoid mag containing products, Mag 2 7 today     9  Mild hyponatremia - resolved     10  Metabolic acidosis - improved with bicarb gtt  Bicarb 31 today  Avoid further bicarb supplementation     11  Mild anemia - ? Dilutional, monitor H&H        Subjective:   She denies chest pain or shortness breath, nausea, vomiting or dysuria  She agrees EGD tomorrow  No complaints except heartburn at times  Objective:     Vitals: Blood pressure 160/62, pulse 63, temperature 98 1 °F (36 7 °C), temperature source Oral, resp  rate 18, height 5' 7" (1 702 m), weight 96 5 kg (212 lb 11 9 oz), SpO2 98 %  ,Body mass index is 33 32 kg/m²  Temp (24hrs), Av 3 °F (36 8 °C), Min:98 1 °F (36 7 °C), Max:98 4 °F (36 9 °C)      Weight (last 2 days)     Date/Time   Weight    17 0700  96 5 (212 74)    17 0600  89 4 (197 09)                Intake/Output Summary (Last 24 hours) at 17 1156  Last data filed at 17 1033   Gross per 24 hour   Intake             2100 ml   Output             1050 ml   Net             1050 ml     I/O last 24 hours: In: 4765 [P O :1020;  I V :1500; IV Piggyback:50]  Out: 1050 [Urine:1050]        Physical Exam:   Gen: NAD  Neck: supple  CV: +s1s2, no friction rub  Pulm: clear b/l  Abdomen: soft, ND, allograft site NTTP  Ext: no edema  Neuro: awake, alert  Skin: warm, dry    Invasive Devices     Peripheral Intravenous Line            Peripheral IV 11/16/17 Right Hand 2 days                Medications:    Scheduled Meds:  amLODIPine 10 mg Oral Daily   ARIPiprazole 20 mg Oral Daily   aspirin 81 mg Oral Daily   cephalexin 250 mg Oral Q8H [address]   cloNIDine 0 2 mg Oral Q24H   cloNIDine 0 3 mg Oral BID   docusate sodium 100 mg Oral BID   doxazosin 2 mg Oral HS   DULoxetine 60 mg Oral BID   folic acid 1 mg Oral BID   heparin (porcine) 5,000 Units Subcutaneous Q8H [address]   hydrALAZINE 50 mg Oral TID   insulin glargine 20 Units Subcutaneous Daily With Breakfast   insulin lispro 1-5 Units Subcutaneous HS   insulin lispro 1-6 Units Subcutaneous TID AC   levothyroxine 88 mcg Oral Early Morning   metoprolol tartrate 50 mg Oral BID   pantoprazole 40 mg Oral BID AC   pravastatin 80 mg Oral Daily   predniSONE 5 mg Oral Daily   rOPINIRole 0 25 mg Oral BID   sertraline 100 mg Oral BID   tacrolimus 3 mg Oral HS   tacrolimus 4 mg Oral Daily   traZODone 150 mg Oral HS       PRN Meds:   acetaminophen    lactulose    LORazepam    ondansetron    senna    zolpidem    Continuous Infusions:         LAB RESULTS:        Results from last 7 days  Lab Units 11/19/17  0646 11/18/17  0554 11/17/17  1415 11/17/17  0519 11/16/17  1138   WBC Thousand/uL 9 27 8 78  --  8 42 9 59   HEMOGLOBIN g/dL 11 1* 11 1*  --  12 9 15 1   HEMATOCRIT % 34 3* 33 7*  --  38 9 45 9   PLATELETS Thousands/uL 171 166 180 204 200   NEUTROS PCT % 70 63  --  70 79*   LYMPHS PCT % 20 27  --  20 14   MONOS PCT % 7 7  --  7 5   EOS PCT % 2 2  --  2 1   SODIUM mmol/L 140 135*  -- 137 137   POTASSIUM mmol/L 4 0 3 3*  --  3 9 4 8   CHLORIDE mmol/L 103 101  --  108 114*   CO2 mmol/L 31 27  --  18* 14*   BUN mg/dL 51* 58*  --  52* 47*   CREATININE mg/dL 2 11* 3 17*  --  2 62* 2 09*   CALCIUM mg/dL 8 4 8 7  --  10 0 11 1*   ALBUMIN g/dL  --   --   --   --  3 2*   TOTAL PROTEIN g/dL  --   --   --   --  10 5*   BILIRUBIN TOTAL mg/dL  --   --   --   --  0 28   ALK PHOS U/L  --   --   --   --  127*   ALT U/L  --   --   --   --  25   AST U/L  --   --   --   --  22   GLUCOSE RANDOM mg/dL 86 119  --  126 156*   MAGNESIUM mg/dL 2 7*  --   --   --   --    PHOSPHORUS mg/dL  --   --   --  3 8  --        CUTURES:  Lab Results   Component Value Date    BLOODCX No Growth at 48 hrs  11/16/2017    BLOODCX No Growth After 5 Days  09/13/2017    BLOODCX No Growth After 5 Days  09/13/2017    BLOODCX No Growth After 5 Days  06/14/2017    BLOODCX No Growth After 5 Days  06/14/2017    BLOODCX No Growth After 5 Days  02/08/2017    BLOODCX No Growth After 5 Days  02/08/2017    URINECX >100,000 cfu/ml Escherichia coli (A) 11/16/2017    URINECX 40,000-49,000 cfu/ml  10/30/2017    URINECX 40,000-49,000 cfu/ml Escherichia coli 09/13/2017    URINECX >100,000 cfu/ml Escherichia coli 08/29/2017    URINECX 60,000-69,000 cfu/ml Escherichia coli 06/14/2017    URINECX >100,000 cfu/ml Escherichia coli 02/08/2017    URINECX No Growth <100 cfu/mL 10/13/2016                 Portions of the record may have been created with voice recognition software  Occasional wrong word or "sound a like" substitutions may have occurred due to the inherent limitations of voice recognition software  Read the chart carefully and recognize, using context, where substitutions have occurred  If you have any questions, please contact the dictating provider

## 2017-11-20 ENCOUNTER — GENERIC CONVERSION - ENCOUNTER (OUTPATIENT)
Dept: GASTROENTEROLOGY | Facility: CLINIC | Age: 52
End: 2017-11-20

## 2017-11-20 ENCOUNTER — ANESTHESIA (INPATIENT)
Dept: GASTROENTEROLOGY | Facility: HOSPITAL | Age: 52
DRG: 682 | End: 2017-11-20
Payer: MEDICARE

## 2017-11-20 ENCOUNTER — ANESTHESIA EVENT (INPATIENT)
Dept: GASTROENTEROLOGY | Facility: HOSPITAL | Age: 52
DRG: 682 | End: 2017-11-20
Payer: MEDICARE

## 2017-11-20 VITALS
SYSTOLIC BLOOD PRESSURE: 163 MMHG | RESPIRATION RATE: 18 BRPM | HEIGHT: 67 IN | WEIGHT: 211 LBS | BODY MASS INDEX: 33.12 KG/M2 | HEART RATE: 57 BPM | OXYGEN SATURATION: 94 % | TEMPERATURE: 98 F | DIASTOLIC BLOOD PRESSURE: 72 MMHG

## 2017-11-20 LAB
ANION GAP SERPL CALCULATED.3IONS-SCNC: 5 MMOL/L (ref 4–13)
BASOPHILS # BLD AUTO: 0.05 THOUSANDS/ΜL (ref 0–0.1)
BASOPHILS NFR BLD AUTO: 1 % (ref 0–1)
BUN SERPL-MCNC: 42 MG/DL (ref 5–25)
CA-I BLD-SCNC: 1.14 MMOL/L (ref 1.12–1.32)
CALCIUM SERPL-MCNC: 8.9 MG/DL (ref 8.3–10.1)
CHLORIDE SERPL-SCNC: 104 MMOL/L (ref 100–108)
CO2 SERPL-SCNC: 32 MMOL/L (ref 21–32)
CREAT SERPL-MCNC: 1.68 MG/DL (ref 0.6–1.3)
EOSINOPHIL # BLD AUTO: 0.26 THOUSAND/ΜL (ref 0–0.61)
EOSINOPHIL NFR BLD AUTO: 3 % (ref 0–6)
ERYTHROCYTE [DISTWIDTH] IN BLOOD BY AUTOMATED COUNT: 17.3 % (ref 11.6–15.1)
GFR SERPL CREATININE-BSD FRML MDRD: 34 ML/MIN/1.73SQ M
GLUCOSE SERPL-MCNC: 101 MG/DL (ref 65–140)
GLUCOSE SERPL-MCNC: 110 MG/DL (ref 65–140)
GLUCOSE SERPL-MCNC: 199 MG/DL (ref 65–140)
GLUCOSE SERPL-MCNC: 90 MG/DL (ref 65–140)
GLUCOSE SERPL-MCNC: 92 MG/DL (ref 65–140)
HCT VFR BLD AUTO: 35.2 % (ref 34.8–46.1)
HGB BLD-MCNC: 11.2 G/DL (ref 11.5–15.4)
INR PPP: 0.93 (ref 0.86–1.16)
LYMPHOCYTES # BLD AUTO: 2.12 THOUSANDS/ΜL (ref 0.6–4.47)
LYMPHOCYTES NFR BLD AUTO: 25 % (ref 14–44)
MCH RBC QN AUTO: 29 PG (ref 26.8–34.3)
MCHC RBC AUTO-ENTMCNC: 31.8 G/DL (ref 31.4–37.4)
MCV RBC AUTO: 91 FL (ref 82–98)
MONOCYTES # BLD AUTO: 0.7 THOUSAND/ΜL (ref 0.17–1.22)
MONOCYTES NFR BLD AUTO: 8 % (ref 4–12)
NEUTROPHILS # BLD AUTO: 5.29 THOUSANDS/ΜL (ref 1.85–7.62)
NEUTS SEG NFR BLD AUTO: 63 % (ref 43–75)
NRBC BLD AUTO-RTO: 0 /100 WBCS
PLATELET # BLD AUTO: 157 THOUSANDS/UL (ref 149–390)
POTASSIUM SERPL-SCNC: 3.7 MMOL/L (ref 3.5–5.3)
PROTHROMBIN TIME: 12.5 SECONDS (ref 12.1–14.4)
RBC # BLD AUTO: 3.86 MILLION/UL (ref 3.81–5.12)
SODIUM SERPL-SCNC: 141 MMOL/L (ref 136–145)
WBC # BLD AUTO: 8.46 THOUSAND/UL (ref 4.31–10.16)

## 2017-11-20 PROCEDURE — 82330 ASSAY OF CALCIUM: CPT | Performed by: INTERNAL MEDICINE

## 2017-11-20 PROCEDURE — 0DB98ZX EXCISION OF DUODENUM, VIA NATURAL OR ARTIFICIAL OPENING ENDOSCOPIC, DIAGNOSTIC: ICD-10-PCS | Performed by: INTERNAL MEDICINE

## 2017-11-20 PROCEDURE — 85025 COMPLETE CBC W/AUTO DIFF WBC: CPT | Performed by: NURSE PRACTITIONER

## 2017-11-20 PROCEDURE — 88342 IMHCHEM/IMCYTCHM 1ST ANTB: CPT | Performed by: INTERNAL MEDICINE

## 2017-11-20 PROCEDURE — 85610 PROTHROMBIN TIME: CPT | Performed by: NURSE PRACTITIONER

## 2017-11-20 PROCEDURE — 88305 TISSUE EXAM BY PATHOLOGIST: CPT | Performed by: INTERNAL MEDICINE

## 2017-11-20 PROCEDURE — 80048 BASIC METABOLIC PNL TOTAL CA: CPT | Performed by: NURSE PRACTITIONER

## 2017-11-20 PROCEDURE — 0DB58ZX EXCISION OF ESOPHAGUS, VIA NATURAL OR ARTIFICIAL OPENING ENDOSCOPIC, DIAGNOSTIC: ICD-10-PCS | Performed by: INTERNAL MEDICINE

## 2017-11-20 PROCEDURE — 82948 REAGENT STRIP/BLOOD GLUCOSE: CPT

## 2017-11-20 PROCEDURE — 0DB68ZX EXCISION OF STOMACH, VIA NATURAL OR ARTIFICIAL OPENING ENDOSCOPIC, DIAGNOSTIC: ICD-10-PCS | Performed by: INTERNAL MEDICINE

## 2017-11-20 RX ORDER — FUROSEMIDE 40 MG/1
20 TABLET ORAL DAILY
Qty: 20 TABLET | Refills: 0 | Status: SHIPPED | OUTPATIENT
Start: 2017-11-20 | End: 2018-01-24

## 2017-11-20 RX ORDER — PROPOFOL 10 MG/ML
INJECTION, EMULSION INTRAVENOUS AS NEEDED
Status: DISCONTINUED | OUTPATIENT
Start: 2017-11-20 | End: 2017-11-20 | Stop reason: SURG

## 2017-11-20 RX ORDER — SODIUM CHLORIDE 9 MG/ML
INJECTION, SOLUTION INTRAVENOUS CONTINUOUS PRN
Status: DISCONTINUED | OUTPATIENT
Start: 2017-11-20 | End: 2017-11-20 | Stop reason: SURG

## 2017-11-20 RX ORDER — LIDOCAINE HYDROCHLORIDE 20 MG/ML
INJECTION, SOLUTION EPIDURAL; INFILTRATION; INTRACAUDAL; PERINEURAL AS NEEDED
Status: DISCONTINUED | OUTPATIENT
Start: 2017-11-20 | End: 2017-11-20 | Stop reason: SURG

## 2017-11-20 RX ORDER — CEPHALEXIN 250 MG/1
250 CAPSULE ORAL EVERY 8 HOURS SCHEDULED
Qty: 30 CAPSULE | Refills: 0 | Status: SHIPPED | OUTPATIENT
Start: 2017-11-20 | End: 2017-11-22

## 2017-11-20 RX ORDER — ZOLPIDEM TARTRATE 5 MG/1
5 TABLET ORAL
Qty: 30 TABLET | Refills: 0
Start: 2017-11-20 | End: 2018-01-24

## 2017-11-20 RX ADMIN — INSULIN GLARGINE 20 UNITS: 100 INJECTION, SOLUTION SUBCUTANEOUS at 13:18

## 2017-11-20 RX ADMIN — CLONIDINE HYDROCHLORIDE 0.3 MG: 0.1 TABLET ORAL at 08:45

## 2017-11-20 RX ADMIN — TACROLIMUS 4 MG: 1 CAPSULE ORAL at 08:45

## 2017-11-20 RX ADMIN — HYDRALAZINE HYDROCHLORIDE 50 MG: 50 TABLET, FILM COATED ORAL at 08:45

## 2017-11-20 RX ADMIN — PANTOPRAZOLE SODIUM 40 MG: 40 TABLET, DELAYED RELEASE ORAL at 08:47

## 2017-11-20 RX ADMIN — PROPOFOL 100 MG: 10 INJECTION, EMULSION INTRAVENOUS at 11:56

## 2017-11-20 RX ADMIN — HEPARIN SODIUM 5000 UNITS: 5000 INJECTION, SOLUTION INTRAVENOUS; SUBCUTANEOUS at 05:32

## 2017-11-20 RX ADMIN — INSULIN LISPRO 2 UNITS: 100 INJECTION, SOLUTION INTRAVENOUS; SUBCUTANEOUS at 16:40

## 2017-11-20 RX ADMIN — ROPINIROLE 0.25 MG: 0.25 TABLET, FILM COATED ORAL at 13:18

## 2017-11-20 RX ADMIN — LIDOCAINE HYDROCHLORIDE 50 MG: 20 INJECTION, SOLUTION EPIDURAL; INFILTRATION; INTRACAUDAL; PERINEURAL at 11:56

## 2017-11-20 RX ADMIN — HEPARIN SODIUM 5000 UNITS: 5000 INJECTION, SOLUTION INTRAVENOUS; SUBCUTANEOUS at 13:17

## 2017-11-20 RX ADMIN — LEVOTHYROXINE SODIUM 88 MCG: 88 TABLET ORAL at 05:31

## 2017-11-20 RX ADMIN — SERTRALINE HYDROCHLORIDE 100 MG: 100 TABLET ORAL at 13:18

## 2017-11-20 RX ADMIN — METOPROLOL TARTRATE 50 MG: 50 TABLET ORAL at 08:45

## 2017-11-20 RX ADMIN — HYDRALAZINE HYDROCHLORIDE 50 MG: 50 TABLET, FILM COATED ORAL at 16:37

## 2017-11-20 RX ADMIN — CLONIDINE HYDROCHLORIDE 0.2 MG: 0.1 TABLET ORAL at 13:17

## 2017-11-20 RX ADMIN — DOCUSATE SODIUM 100 MG: 100 CAPSULE, LIQUID FILLED ORAL at 13:23

## 2017-11-20 RX ADMIN — DULOXETINE HYDROCHLORIDE 60 MG: 60 CAPSULE, DELAYED RELEASE ORAL at 13:18

## 2017-11-20 RX ADMIN — CEPHALEXIN 250 MG: 250 CAPSULE ORAL at 13:18

## 2017-11-20 RX ADMIN — ARIPIPRAZOLE 20 MG: 10 TABLET ORAL at 13:17

## 2017-11-20 RX ADMIN — FOLIC ACID 1 MG: 1 TABLET ORAL at 13:17

## 2017-11-20 RX ADMIN — AMLODIPINE BESYLATE 10 MG: 10 TABLET ORAL at 08:45

## 2017-11-20 RX ADMIN — PREDNISONE 5 MG: 5 TABLET ORAL at 13:18

## 2017-11-20 RX ADMIN — METOPROLOL TARTRATE 50 MG: 50 TABLET ORAL at 16:37

## 2017-11-20 RX ADMIN — PANTOPRAZOLE SODIUM 40 MG: 40 TABLET, DELAYED RELEASE ORAL at 16:36

## 2017-11-20 RX ADMIN — PROPOFOL 50 MG: 10 INJECTION, EMULSION INTRAVENOUS at 11:59

## 2017-11-20 RX ADMIN — CLONIDINE HYDROCHLORIDE 0.3 MG: 0.1 TABLET ORAL at 16:37

## 2017-11-20 RX ADMIN — ASPIRIN 81 MG 81 MG: 81 TABLET ORAL at 13:21

## 2017-11-20 RX ADMIN — CEPHALEXIN 250 MG: 250 CAPSULE ORAL at 05:32

## 2017-11-20 RX ADMIN — PRAVASTATIN SODIUM 80 MG: 80 TABLET ORAL at 13:17

## 2017-11-20 RX ADMIN — SODIUM CHLORIDE: 0.9 INJECTION, SOLUTION INTRAVENOUS at 11:30

## 2017-11-20 NOTE — CASE MANAGEMENT
Continued Stay Review    Date: 11-20-17      Vital Signs: /72   Pulse 58   Temp (!) 97 4 °F (36 3 °C) (Tympanic)   Resp 18   Ht 5' 7" (1 702 m)   Wt 95 7 kg (211 lb)   SpO2 96%   BMI 33 05 kg/m²     Medications:   Scheduled Meds:   amLODIPine 10 mg Oral Daily   ARIPiprazole 20 mg Oral Daily   aspirin 81 mg Oral Daily   cephalexin 250 mg Oral Q8H [facility]   cloNIDine 0 2 mg Oral Q24H   cloNIDine 0 3 mg Oral BID   docusate sodium 100 mg Oral BID   doxazosin 2 mg Oral HS   DULoxetine 60 mg Oral BID   folic acid 1 mg Oral BID   heparin (porcine) 5,000 Units Subcutaneous Q8H [facility]   hydrALAZINE 50 mg Oral TID   insulin glargine 20 Units Subcutaneous Daily With Breakfast   insulin lispro 1-5 Units Subcutaneous HS   insulin lispro 1-6 Units Subcutaneous TID AC   levothyroxine 88 mcg Oral Early Morning   metoprolol tartrate 50 mg Oral BID   pantoprazole 40 mg Oral BID AC   pravastatin 80 mg Oral Daily   predniSONE 5 mg Oral Daily   rOPINIRole 0 25 mg Oral BID   sertraline 100 mg Oral BID   tacrolimus 3 mg Oral HS   tacrolimus 4 mg Oral Daily   traZODone 150 mg Oral HS     Continuous Infusions:    PRN Meds:   acetaminophen    lactulose    LORazepam    ondansetron    senna    zolpidem    Abnormal Labs/Diagnostic Results:        Esophagogastroduodenoscopy      One island of pink mucosa just proximal to the GE junction  Biopsy taken  Normal z-line and rest of the esophagus  Gastritis found in   the antrum and body of the stomach  Biopsy taken  Normal 1st portion of   the duodenum and 2nd portion of the duodenum  Biopsy taken  Age/Sex: 47 y o  female     Assessment/Plan:     NEPHROLOGY  ASSESSMENT/PLAN:  1  Renal   Patient has history of renal transplantation  She had acute renal failure and creatinine now has declined back to baseline so may been related to pre renal azotemia  At this point she is not on any fluids creatinine is at baseline    Her immunosuppressive so ordered with prednisone and tacrolimus at the appropriate dosages  Again creatinine is back to baseline will continue current immunosuppression and monitor  The patient was status post EGD today and no was reviewed      2   Urinary tract infection  Infectious disease is consulted she has been placed on cephalexin orally  INFECTIOUS DISEASE     1   E coli bacteriuria with possible recurrent UTI with bladder calculi and diverticula  2  Type 1 diabetes mellitus with nephropathy, retinopathy, neuropathy, and PAD, S/P renal transplant and failed pancreatic transplant  3  S/P left transmetatarsal and right hallux amputations  4  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

## 2017-11-20 NOTE — ANESTHESIA POSTPROCEDURE EVALUATION
Post-Op Assessment Note      CV Status:  Stable    Mental Status:  Alert and awake    Hydration Status:  Euvolemic    PONV Controlled:  Controlled    Airway Patency:  Patent    Post Op Vitals Reviewed: Yes          Staff: CRNA           BP     Temp     Pulse     Resp      SpO2

## 2017-11-20 NOTE — DISCHARGE SUMMARY
Discharge Summary - [facility]    Patient Information: Mora Basilio 47 y o  female MRN: [id number]  Unit/Bed#: [facility] 725-01 Encounter: [id number]    Discharging Physician / Practitioner: Anitha Hooker  PCP: Bob Pino MD  Admission Date: 11/16/2017  Discharge Date: 11/20/17    Reason for Admission: epigastric pain with heartburn    Discharge Diagnoses:     Principal Problem (Resolved):    Epigastric pain  Active Problems:    Acquired hypothyroidism    Confusion    Acute kidney injury ([facility] [address] )    Type 1 diabetes mellitus ([facility] [address] )    Essential hypertension    Status post simultaneous kidney and pancreas transplant ([facility] )    Calculus, bladder, diverticulum    Stage 3 chronic kidney disease    Heartburn    Hypercalcemia    MGUS (monoclonal gammopathy of unknown significance)    Major depressive disorder    Anxiety    Constipation  Resolved Problems:    UTI (urinary tract infection)    Nausea and vomiting      Consultations During Hospital Stay:  · Dr Ginny Baumann  · Dr Irena Bob urology  · Dr Raymond Dukes / dr Mery Sun nephrology    Procedures Performed:     · Urine culture with ecoli  · Blood cultures no growth  · Ct abdomen and pelvis wo contrast:No significant interval change from prior CT June 9, 2017 except for nodular subcentimeter densities in the second/third portion of the duodenum which could be from ingested material, correlate clinically  Atrophic kidneys and pelvic transplants are unchanged  Egd: One island of pink mucosa just proximal to the GE junction  Biopsy taken  Normal z-line and rest of the esophagus  Gastritis found in   the antrum and body of the stomach  Biopsy taken  Normal 1st portion of   the duodenum and 2nd portion of the duodenum  Biopsy taken  Significant Findings / Test Results:     · See above     Incidental Findings:   · None      Test Results Pending at Discharge (will require follow up):    · Blood culture with no growth      Outpatient Tests Requested:  · Follow up with pcp in one week   · Pt also need to have repeat tacrolimus level as scheduled due to elevated level here upon admission     Complications:  Elevated prograf level     Hospital Course: Terrell Weeks is a 47 y o  female patient who originally presented to the hospital on 11/16/2017 due to epigastric pain to the emergency room at [facility]  Approximately 2 weeks ago, patient was treated here as an inpatient due to urinary tract infection  At that time, patient had E coli UTI  It was treated with Ancef and eventually patient was discharge on Bactrim prophylactic doses as patient is on immunosuppressants due to history of both kidney and pancreatic transplant  Patient was also found to have urinary bladder calculi and patient will follow up with Urology as an outpatient to consider lithotripsy  Patient was discharged to home and was doing fine until approximately 4 days ago  According to the patient, 4 days ago, she started having stomach pains with heartburn symptoms  Patient informed admitting  that she has been having on and off nausea and vomiting since 4 days ago  Due to this, patient has been having poor oral intake since then  Patient mentioned that due to the above symptoms, she had difficulty taking her medications  The patient denies any other abdominal pains he except for the epigastric pains  Patient also denied any flank pains  Patient denied any urinary symptoms like pain when she urinates or any burning sensation on urination  Patient denied any diarrhea or constipation  Patient reported  having on and off chills for the last 4 days  Patient denied actual fever  she did not have fever even if she had an infection in the past   In the emergency room, patient was found to have a urinary tract infection which is recurrent and patient was started again on IV cefazolin    According to the initial history of this patient was taken by the ER physician and by the nephrologist who saw this patient 1st, patient was confused earlier day of admission  CT scan of the abdomen revealing possibility of foreign body in the duodenum,  Patient denied that she had  ingested anything like coins, and she denies ingesting anything other than food and fluids  When asked, patient admits that she is depressed and that she has history of depression and anxiety and she takes psychiatric medications for this  When asked, patient completely denies any intentions of hurting herself  Patient completely denies any suicidal intentions, thoughts or plans  Pt was admitted for further evaluation and seen by the nephrology team for her ckd with an acute component  She was also seen by id for bacteruria but were reluctant to treat with iv abx and started on po bactrim after which the sensitivities demonstrated pt was resistant  Pt was transitioned to po keflex and course was to be completed with two final days after discharge  Pt was seen by our gi team  Out of concern for ongoing dehydration and the fact that the patient was at high risk due to her prior transplant it was determined that it was necessary for the patient to undergo an egd prior to dc  Egd demonstrated gastritis  Where a biopsy was obtained  Pt was aggressively hydrated and returned to her baseline  Pt was deemed stable for discharge with vna set up for her discharge and follow up  Pt should follow with her nephrologist hold her sodium tabs and resume 20mg of her diuretics as discussed with dr Mace Aase to follow up with them in regards to resumption of her medications     Follow up with pcp in one week   Her prograf level came back on day of discharge as 28 nephrology provided pt with scripts for repeat lab (there was some suspicion for accuracy) they were ok with pt being discharge and getting her repeat lab done         Condition at Discharge: fair     Discharge Day Visit / Exam:     Subjective:  Pt is doing well ate and tolerated diet  No abdominal pain no n/v discussed her prograf level and her follow up   Vitals: Blood Pressure: 163/72 (11/20/17 1607)  Pulse: 57 (11/20/17 1607)  Temperature: 98 °F (36 7 °C) (11/20/17 1607)  Temp Source: Oral (11/20/17 1607)  Respirations: 18 (11/20/17 1607)  Height: 5' 7" (170 2 cm) (11/20/17 1002)  Weight - Scale: 95 7 kg (211 lb) (11/20/17 1002)  SpO2: 94 % (11/20/17 1607)  Exam:   Physical Exam   Constitutional: She is oriented to person, place, and time  She appears well-developed and well-nourished  No distress  HENT:   Head: Normocephalic and atraumatic  Eyes: Conjunctivae are normal  Pupils are equal, round, and reactive to light  Right eye exhibits no discharge  Left eye exhibits no discharge  No scleral icterus  Neck: Normal range of motion  Neck supple  No JVD present  No tracheal deviation present  No thyromegaly present  Cardiovascular: Normal rate  Exam reveals no gallop and no friction rub  No murmur heard  Pulmonary/Chest: No stridor  No respiratory distress  She has no wheezes  She has no rales  She exhibits no tenderness  Abdominal: Soft  She exhibits no distension and no mass  There is no tenderness  There is no rebound and no guarding  Musculoskeletal: She exhibits no edema, tenderness or deformity  Lymphadenopathy:     She has no cervical adenopathy  Neurological: She is oriented to person, place, and time  Skin: No rash noted  She is not diaphoretic  No erythema  No pallor  Psychiatric: She has a normal mood and affect  Discussion with Family: no family at bedside     Discharge instructions/Information to patient and family:   See after visit summary for information provided to patient and family  Provisions for Follow-Up Care:  See after visit summary for information related to follow-up care and any pertinent home health orders        Disposition:     Home with vna     For Discharges to [address] [facility]:   · Not Applicable to this Patient - Not Applicable to this Patient    Planned Readmission: discharge     Discharge Statement:  I spent 45 minutes discharging the patient  This time was spent on the day of discharge  I had direct contact with the patient on the day of discharge  Greater than 50% of the total time was spent examining patient, answering all patient questions, arranging and discussing plan of care with patient as well as directly providing post-discharge instructions  Additional time then spent on discharge activities  Discharge Medications:  See after visit summary for reconciled discharge medications provided to patient and family        ** Please Note: This note has been constructed using a voice recognition system **

## 2017-11-20 NOTE — PROGRESS NOTES
UROLOGY PROGRESS NOTE   Patient Identifiers: Harshal Levy (MRN [id number])SADI, 47 y o , 1965   Date of Service: 11/20/2017    Subjective:     24 HR EVENTS:   no significant events        Patient has  feels better      Objective:     VITALS:    Vitals:    11/20/17 0727   BP: 156/71   Pulse: 63   Resp: 16   Temp: 98 9 °F (37 2 °C)   SpO2: 95%       INS & OUTS:  [unfilled]    LABS:  Lab Results   Component Value Date    HGB 11 2 (L) 11/20/2017    HCT 35 2 11/20/2017    WBC 8 46 11/20/2017     11/20/2017   ]    Lab Results   Component Value Date     11/20/2017    K 3 7 11/20/2017     11/20/2017    CO2 32 11/20/2017    BUN 42 (H) 11/20/2017    CREATININE 1 68 (H) 11/20/2017    CALCIUM 8 9 11/20/2017    GLUCOSE 90 11/20/2017   ]    INPATIENT MEDS:    Current Facility-Administered Medications:     acetaminophen (TYLENOL) tablet 650 mg, 650 mg, Oral, Q6H PRN, Adilia Daley MD, 650 mg at 11/17/17 0512    amLODIPine (NORVASC) tablet 10 mg, 10 mg, Oral, Daily, Adilia Daley MD, 10 mg at 11/19/17 0823    ARIPiprazole (ABILIFY) tablet 20 mg, 20 mg, Oral, Daily, Adilia Daley MD, 20 mg at 11/19/17 0820    aspirin chewable tablet 81 mg, 81 mg, Oral, Daily, Adilia Daley MD, 81 mg at 11/19/17 0823    cephalexin (KEFLEX) capsule 250 mg, 250 mg, Oral, Q8H [facility], Wenceslao De La Fuente MD, 250 mg at 11/20/17 0532    cloNIDine (CATAPRES) tablet 0 2 mg, 0 2 mg, Oral, Q24H, Adilia Daley MD, 0 2 mg at 11/19/17 1225    cloNIDine (CATAPRES) tablet 0 3 mg, 0 3 mg, Oral, BID, Adilia Daley MD, 0 3 mg at 11/19/17 1656    docusate sodium (COLACE) capsule 100 mg, 100 mg, Oral, BID, MER Moreno, 100 mg at 11/19/17 1656    doxazosin (CARDURA) tablet 2 mg, 2 mg, Oral, HS, Adilia Daley MD, 2 mg at 11/19/17 2152    DULoxetine (CYMBALTA) delayed release capsule 60 mg, 60 mg, Oral, BID, Adilia Daley MD, 60 mg at 40/08/97 3433    folic acid (FOLVITE) tablet 1 mg, 1 mg, Oral, BID, Adilia Daley MD, 1 mg at 11/19/17 1656    heparin (porcine) subcutaneous injection 5,000 Units, 5,000 Units, Subcutaneous, Q8H [address], 5,000 Units at 11/20/17 0532 **AND** Platelet count, , , Once, Adilia Daley MD    hydrALAZINE (APRESOLINE) tablet 50 mg, 50 mg, Oral, TID, Adilia Daley MD, 50 mg at 11/19/17 2151    insulin glargine (LANTUS) subcutaneous injection 20 Units, 20 Units, Subcutaneous, Daily With Breakfast, Adilia Daley MD, 20 Units at 11/19/17 0827    insulin lispro (HumaLOG) 100 units/mL subcutaneous injection 1-5 Units, 1-5 Units, Subcutaneous, HS, Adilia Daley MD, 1 Units at 11/19/17 2322    insulin lispro (HumaLOG) 100 units/mL subcutaneous injection 1-6 Units, 1-6 Units, Subcutaneous, TID AC, 2 Units at 11/18/17 1649 **AND** Fingerstick Glucose (POCT), , , TID AC, Adilia Daley MD    lactulose 20 g/30 mL oral solution 20 g, 20 g, Oral, BID PRN, Gaylia Khalift, CRNP, 20 g at 11/18/17 1648    levothyroxine tablet 88 mcg, 88 mcg, Oral, Early Morning, Adilia Daley MD, 88 mcg at 11/20/17 0531    LORazepam (ATIVAN) tablet 2 mg, 2 mg, Oral, BID PRN, Adilia Daley MD, 2 mg at 11/19/17 1657    metoprolol tartrate (LOPRESSOR) tablet 50 mg, 50 mg, Oral, BID, Adilia Daley MD, 50 mg at 11/19/17 1656    ondansetron (ZOFRAN) injection 4 mg, 4 mg, Intravenous, Q6H PRN, Adilia Daley MD, 4 mg at 11/17/17 0957    pantoprazole (PROTONIX) EC tablet 40 mg, 40 mg, Oral, BID AC, Adilia Daley MD, 40 mg at 11/19/17 1656    pravastatin (PRAVACHOL) tablet 80 mg, 80 mg, Oral, Daily, Adilia Daley MD, 80 mg at 11/19/17 0821    predniSONE tablet 5 mg, 5 mg, Oral, Daily, Adilia Daley MD, 5 mg at 11/19/17 0822    rOPINIRole (REQUIP) tablet 0 25 mg, 0 25 mg, Oral, BID, Adilia Daley MD, 0 25 mg at 11/19/17 1656    senna (SENOKOT) tablet 8 6 mg, 1 tablet, Oral, HS PRN, Adilia Daley MD, 8 6 mg at 11/17/17 1720    sertraline (ZOLOFT) tablet 100 mg, 100 mg, Oral, BID, Adilia Daley MD, 100 mg at 11/19/17 1655    tacrolimus (PROGRAF) capsule 3 mg, 3 mg, Oral, HS, Adilia Daley MD, 3 mg at 11/19/17 2151    tacrolimus (PROGRAF) capsule 4 mg, 4 mg, Oral, Daily, Adilia Daley MD, 4 mg at 11/19/17 0821    traZODone (DESYREL) tablet 150 mg, 150 mg, Oral, HS, Adilia Daley MD, 150 mg at 11/19/17 2151    zolpidem (AMBIEN) tablet 5 mg, 5 mg, Oral, HS PRN, Adilia Daley MD      Physical Exam:   /71   Pulse 63   Temp 98 9 °F (37 2 °C) (Oral)   Resp 16   Ht 5' 7" (1 702 m)   Wt 95 8 kg (211 lb 3 2 oz)   SpO2 95%   BMI 33 08 kg/m²   GEN: no acute distress    RESP: breathing comfortably with no accessory muscle use    ABD: soft, non-tender, non-distended   INCISION:    EXT: no significant peripheral edema   DRAINS:   URINE: clear    RADIOLOGY:   Reviewed reports     Assessment:   1  Recurrent urinary tract infection   2  Diffuse abdominal pain  3  CKD III  4  Renal and pancreas transplant  5  Type I diabetes  6  HTN  7  Depression/ anxiety        Plan:   - seen with Dr Natasha Kenny  - antibiotics per ID- 100,000 e  Coli bacteruria  - recommend vaginal estrogen replacement- start as outpatient  - urology follow up  -  -

## 2017-11-20 NOTE — PROGRESS NOTES
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  Recommendations:  E coli bacteriuria  1  Continue cephalexin 250 mg q 8 hours p o  Epigastric pain with heartburn  1  EGD completed and results noted    Antibiotics:  1  Cephalexin 250 mg q 8 hours P O , day 5 of 7  Total antibiotics    Subjective:  Patient feels well without any pain or UTI symptoms  Denies fevers, chills, or sweats  Denies nausea, vomiting, or diarrhea  Objective:  Vitals:  HR:  [55-72] 58  Resp:  [16-18] 18  BP: (131-180)/(51-79) 159/72  SpO2:  [90 %-96 %] 96 %  Temp (24hrs), Av °F (36 7 °C), Min:97 4 °F (36 3 °C), Max:98 9 °F (37 2 °C)  Current: Temperature: (!) 97 4 °F (36 3 °C)    Physical Exam:     General Appearance:  Alert,  and responsive nontoxic, no acute distress  Throat: Oropharynx moist without lesions  Lips, mucosa, and tongue normal   Lungs:   Clear to auscultation bilaterally, no audible wheezes, rhonchi or rales; respirations unlabored   Heart:  Regular rate and rhythm, S1, S2 normal, no murmur, rub or gallop   Abdomen:   Soft, non-tender, non-distended, positive bowel sounds  No masses, no organomegaly, surgical scars    No CVA tenderness, surgical scars, palpable transplant kidney   Extremities: Extremities normal, atraumatic, no clubbing, cyanosis or edema, S/P left transmetatarsal and right hallux amputations, decreased pulses   Skin: Skin color, texture, turgor normal, no rashes or lesions  No draining wounds noted           Invasive Devices     Peripheral Intravenous Line            Peripheral IV 17 Right Hand 4 days                Labs, Imaging, & Other studies:   All pertinent labs were personally reviewed    Results from last 7 days  Lab Units 11/20/17  0501 11/19/17  0646 11/18/17  0554   WBC Thousand/uL 8 46 9 27 8 78   HEMOGLOBIN g/dL 11 2* 11 1* 11 1*   PLATELETS Thousands/uL 157 171 166       Results from last 7 days  Lab Units 11/20/17  0501 11/19/17  0646 11/18/17  0554  11/16/17  1138   SODIUM mmol/L 141 140 135*  < > 137   POTASSIUM mmol/L 3 7 4 0 3 3*  < > 4 8   CHLORIDE mmol/L 104 103 101  < > 114*   CO2 mmol/L 32 31 27  < > 14*   ANION GAP mmol/L 5 6 7  < > 9   BUN mg/dL 42* 51* 58*  < > 47*   CREATININE mg/dL 1 68* 2 11* 3 17*  < > 2 09*   EGFR ml/min/1 73sq m 34 26 16  < > 26   GLUCOSE RANDOM mg/dL 90 86 119  < > 156*   CALCIUM mg/dL 8 9 8 4 8 7  < > 11 1*   AST U/L  --   --   --   --  22   ALT U/L  --   --   --   --  25   ALK PHOS U/L  --   --   --   --  127*   TOTAL PROTEIN g/dL  --   --   --   --  10 5*   ALBUMIN g/dL  --   --   --   --  3 2*   BILIRUBIN TOTAL mg/dL  --   --   --   --  0 28   < > = values in this interval not displayed      Results from last 7 days  Lab Units 11/16/17 2043 11/16/17  1803   BLOOD CULTURE  No Growth at 72 hrs   --    URINE CULTURE   --  >100,000 cfu/ml Escherichia coli*

## 2017-11-20 NOTE — DISCHARGE INSTRUCTIONS
Please get your labs as ordered on the script with results to nephrology   Acute Kidney Injury, Ambulatory Care   GENERAL INFORMATION:   Acute kidney injury  happens when your kidneys suddenly stop working correctly  Normally, the kidneys turn fluid, chemicals, and waste from your blood into urine  In acute kidney injury, your kidneys can no longer do this  In most cases, it is temporary, but it may become a chronic kidney condition  Common symptoms include the following:   · Decreased urination or dark-colored urine    · Swelling in your arms, legs, or feet     · Abdominal or low back pain    · Vomiting, diarrhea, or loss of appetite    · Fatigue     · Skin rash  Seek immediate care for the following symptoms:   · Heart beating faster than normal for you    · Sudden chest pain or trouble breathing    · Seizure  Treatment for acute kidney injury:  Treatment depends upon the cause of your acute kidney injury and how severe it is  Medicines may be given to increase blood flow to your kidneys and protect your kidneys  You may also need medicine to decrease inflammation in your kidneys  You may be given IV fluids to replenish fluids and help your heart pump blood  Dialysis may be needed to remove chemicals and waste from your blood when your kidneys cannot  Manage acute kidney injury:   · Manage other health conditions  Care for your diabetes, high blood pressure, or heart disease  These conditions increase your risk for acute kidney injury  · Talk to your healthcare provider before you take over-the-counter-medicine  NSAIDs, stomach medicine, or laxatives may harm your kidneys and increase your risk for acute kidney injury  Follow up with your healthcare provider as directed:  Write down your questions so you remember to ask them during your visits  CARE AGREEMENT:   You have the right to help plan your care  Learn about your health condition and how it may be treated   Discuss treatment options with your caregivers to decide what care you want to receive  You always have the right to refuse treatment  The above information is an  only  It is not intended as medical advice for individual conditions or treatments  Talk to your doctor, nurse or pharmacist before following any medical regimen to see if it is safe and effective for you  © 2014 [address] is for End User's use only and may not be sold, redistributed or otherwise used for commercial purposes  All illustrations and images included in CareNotes® are the copyrighted property of [email or web address]  or Tamir Leblanc  Please call to schedule follow up appointment     Acute Kidney Injury, Ambulatory Care   GENERAL INFORMATION:   Acute kidney injury  happens when your kidneys suddenly stop working correctly  Normally, the kidneys turn fluid, chemicals, and waste from your blood into urine  In acute kidney injury, your kidneys can no longer do this  In most cases, it is temporary, but it may become a chronic kidney condition  Common symptoms include the following:   · Decreased urination or dark-colored urine    · Swelling in your arms, legs, or feet     · Abdominal or low back pain    · Vomiting, diarrhea, or loss of appetite    · Fatigue     · Skin rash  Seek immediate care for the following symptoms:   · Heart beating faster than normal for you    · Sudden chest pain or trouble breathing    · Seizure  Treatment for acute kidney injury:  Treatment depends upon the cause of your acute kidney injury and how severe it is  Medicines may be given to increase blood flow to your kidneys and protect your kidneys  You may also need medicine to decrease inflammation in your kidneys  You may be given IV fluids to replenish fluids and help your heart pump blood  Dialysis may be needed to remove chemicals and waste from your blood when your kidneys cannot  Manage acute kidney injury:   · Manage other health conditions  Care for your diabetes, high blood pressure, or heart disease  These conditions increase your risk for acute kidney injury  · Talk to your healthcare provider before you take over-the-counter-medicine  NSAIDs, stomach medicine, or laxatives may harm your kidneys and increase your risk for acute kidney injury  Follow up with your healthcare provider as directed:  Write down your questions so you remember to ask them during your visits  CARE AGREEMENT:   You have the right to help plan your care  Learn about your health condition and how it may be treated  Discuss treatment options with your caregivers to decide what care you want to receive  You always have the right to refuse treatment  The above information is an  only  It is not intended as medical advice for individual conditions or treatments  Talk to your doctor, nurse or pharmacist before following any medical regimen to see if it is safe and effective for you  © 2014 [address] is for End User's use only and may not be sold, redistributed or otherwise used for commercial purposes  All illustrations and images included in CareNotes® are the copyrighted property of [employer]  or Tamir Leblanc

## 2017-11-20 NOTE — ANESTHESIA PREPROCEDURE EVALUATION
Review of Systems/Medical History  Patient summary reviewed        Cardiovascular  Hypertension , Valvular heart disease , aortic insufficiency, CHF ,   Comment: Severe AI    Echo 2017--   HISTORY: PRIOR HISTORY: Aortic insufficiency, CHF, atrial fibrillation, CKD      PROCEDURE: The study was performed in the [address] [facility]  This was a routine study  The transthoracic approach was used  The study  included complete 2D imaging, M-mode, complete spectral Doppler, and color  Doppler  The heart rate was 65 bpm, at the start of the study  Images were  obtained from the parasternal, apical, subcostal, and suprasternal notch  acoustic windows  Image quality was adequate      LEFT VENTRICLE: Size was at the upper limits of normal  Systolic function was  normal  Ejection fraction was estimated to be 70 %  There were no regional wall  motion abnormalities  Wall thickness was at the upper limits of normal  No  evidence of apical thrombus  DOPPLER: Doppler parameters were consistent with  abnormal left ventricular relaxation (grade 1 diastolic dysfunction)      RIGHT VENTRICLE: The size was normal  Systolic function was normal  Wall  thickness was normal      LEFT ATRIUM: Size was at the upper limits of normal      RIGHT ATRIUM: Size was normal      MITRAL VALVE: Valve structure was normal  There was normal leaflet separation  DOPPLER: The transmitral velocity was within the normal range  There was no  evidence for stenosis  There was mild regurgitation      AORTIC VALVE: The valve was trileaflet  Leaflets exhibited normal thickness,  mild calcification, and normal cuspal separation  The valve was not well  visualized  DOPPLER: Transaortic velocity was increased due to increased  transvalvular flow  There was no evidence for stenosis  There was likely  moderate to severe regurgitation      TRICUSPID VALVE: The valve structure was normal  There was normal leaflet  separation   DOPPLER: The transtricuspid velocity was within the normal range  There was no evidence for stenosis  There was no significant regurgitation      PULMONIC VALVE: Leaflets exhibited normal thickness, no calcification, and  normal cuspal separation  DOPPLER: The transpulmonic velocity was within the  normal range  There was no significant regurgitation      PERICARDIUM: There was no pericardial effusion  The pericardium was normal in  appearance      AORTA: The root exhibited normal size ,  Pulmonary       GI/Hepatic      Comment: Last N/V 5 days ago     Chronic kidney disease stage 3,   Comment: S/P renal transplant 20 years ago     Endo/Other  Diabetes , History of thyroid disease , hypothyroidism,      GYN       Hematology  Anemia ,     Musculoskeletal       Neurology   Psychology   Anxiety, Depression ,            Physical Exam    Airway    Mallampati score: II  TM Distance: >3 FB  Neck ROM: full     Dental       Cardiovascular      Pulmonary      Other Findings        Anesthesia Plan  ASA Score- 3       Anesthesia Type- IV sedation with anesthesia with ASA Monitors  Additional Monitors:   Airway Plan:           Induction- intravenous  Informed Consent- Anesthetic plan and risks discussed with patient

## 2017-11-20 NOTE — OP NOTE
**** GI/ENDOSCOPY REPORT ****     PATIENT NAME: Luciano Gonzalez - VISIT ID:  Patient ID:   [id number] YOB: 1965     INTRODUCTION: Esophagogastroduodenoscopy - A 47 female patient presents   for an inpatient Esophagogastroduodenoscopy at [facility]  INDICATIONS: Abdominal pain  CONSENT: The benefits, risks, and alternatives to the procedure were   discussed and informed consent was obtained from the patient  PREPARATION:  EKG, pulse, pulse oximetry and blood pressure were monitored   throughout the procedure  MEDICATIONS: Anesthesia-check records     PROCEDURE:  The endoscope was passed without difficulty through the mouth   under direct visualization and advanced to the 2nd portion of the   duodenum  The scope was withdrawn and the mucosa was carefully examined  FINDINGS:   Esophagus: One island of pink mucosa just proximal to the GE   junction  A cold forceps biopsy was taken  The z-line appeared to be   normal, 41 cm from the entry site  Stomach: Gastritis was found in the   antrum and body of the stomach  A cold forceps biopsy was taken  Duodenum: The 1st portion of the duodenum and 2nd portion of the duodenum   appeared to be normal  A cold forceps biopsy was taken  COMPLICATIONS: There were no complications  IMPRESSIONS: One island of pink mucosa just proximal to the GE junction  Biopsy taken  Normal z-line and rest of the esophagus  Gastritis found in   the antrum and body of the stomach  Biopsy taken  Normal 1st portion of   the duodenum and 2nd portion of the duodenum  Biopsy taken  RECOMMENDATIONS: Await biopsy results  Can resume diet  ESTIMATED BLOOD LOSS:     PATHOLOGY SPECIMENS: Cold forceps biopsy taken  Associated finding: One   island of pink mucosa  Cold forceps biopsy taken  Associated finding:   Gastritis  Cold forceps random biopsy taken       PROCEDURE CODES:     ICD-9 Codes: 789 00 Abdominal pain, unspecified site 535 50 Unspecified   gastritides and gastroduodenitis, without mention of hemorrhage     ICD-10 Codes: R10 Abdominal and pelvic pain K29 Gastritis and duodenitis     PERFORMED BY: NOVA Craig O  on 11/20/2017  Version 1, electronically signed by NOVA Kessler O  on   11/20/2017 at 12:06

## 2017-11-20 NOTE — PLAN OF CARE
DISCHARGE PLANNING     Discharge to home or other facility with appropriate resources Progressing        DISCHARGE PLANNING - CARE MANAGEMENT     Discharge to post-acute care or home with appropriate resources Progressing        INFECTION - ADULT     Absence or prevention of progression during hospitalization Progressing        Knowledge Deficit     Patient/family/caregiver demonstrates understanding of disease process, treatment plan, medications, and discharge instructions Progressing        METABOLIC, FLUID AND ELECTROLYTES - ADULT     Electrolytes maintained within normal limits Progressing     Glucose maintained within target range Progressing        PAIN - ADULT     Verbalizes/displays adequate comfort level or baseline comfort level Progressing        Potential for Falls     Patient will remain free of falls Progressing        SAFETY ADULT     Maintain or return to baseline ADL function Progressing     Maintain or return mobility status to optimal level Progressing

## 2017-11-20 NOTE — PROGRESS NOTES
NEPHROLOGY PROGRESS NOTE    Chanelle Galdamez 47 y o  female MRN: [id number]  Unit/Bed#: [facility] 725-01 Encounter: [id number]  Reason for Consult:  Renal    ASSESSMENT/PLAN:  1  Renal   Patient has history of renal transplantation  She had acute renal failure and creatinine now has declined back to baseline so may been related to pre renal azotemia  At this point she is not on any fluids creatinine is at baseline  Her immunosuppressive so ordered with prednisone and tacrolimus at the appropriate dosages  Again creatinine is back to baseline will continue current immunosuppression and monitor  The patient was status post EGD today and no was reviewed  2   Urinary tract infection  Infectious disease is consulted she has been placed on cephalexin orally  SUBJECTIVE:  Review of Systems   Constitution: Negative  HENT: Negative  Eyes: Negative  Cardiovascular: Negative  Negative for chest pain  Respiratory: Negative for cough and shortness of breath  Gastrointestinal: Negative for bloating, abdominal pain, diarrhea and vomiting  Genitourinary: Negative  Neurological: Negative  OBJECTIVE:  Current Weight: Weight - Scale: 95 7 kg (211 lb)  Vitals:Temp (24hrs), Av 9 °F (36 6 °C), Min:97 4 °F (36 3 °C), Max:98 9 °F (37 2 °C)  Current: Temperature: (!) 97 4 °F (36 3 °C)   Blood pressure 159/72, pulse 58, temperature (!) 97 4 °F (36 3 °C), temperature source Tympanic, resp  rate 18, height 5' 7" (1 702 m), weight 95 7 kg (211 lb), SpO2 96 %  ,Body mass index is 33 05 kg/m²  Intake/Output Summary (Last 24 hours) at 17 1419  Last data filed at 17 1301   Gross per 24 hour   Intake              425 ml   Output             1650 ml   Net            -1225 ml       Physical Exam: /72   Pulse 58   Temp (!) 97 4 °F (36 3 °C) (Tympanic)   Resp 18   Ht 5' 7" (1 702 m)   Wt 95 7 kg (211 lb)   SpO2 96%   BMI 33 05 kg/m²   Physical Exam   Constitutional: No distress     HENT: Mouth/Throat: No oropharyngeal exudate  Eyes: No scleral icterus  Neck: Neck supple  No JVD present  Cardiovascular: Normal rate  Exam reveals no friction rub  Pulmonary/Chest: Effort normal and breath sounds normal  No respiratory distress  She has no wheezes  She has no rales  Abdominal: Soft  Bowel sounds are normal  She exhibits no distension  There is no tenderness         Medications:    Current Facility-Administered Medications:     acetaminophen (TYLENOL) tablet 650 mg, 650 mg, Oral, Q6H PRN, Adilia Daley MD, 650 mg at 11/17/17 0512    amLODIPine (NORVASC) tablet 10 mg, 10 mg, Oral, Daily, Adilia Daley MD, 10 mg at 11/20/17 0845    ARIPiprazole (ABILIFY) tablet 20 mg, 20 mg, Oral, Daily, Aidlia Daley MD, 20 mg at 11/20/17 1317    aspirin chewable tablet 81 mg, 81 mg, Oral, Daily, Adilia Daley MD, 81 mg at 11/20/17 1321    cephalexin (KEFLEX) capsule 250 mg, 250 mg, Oral, Q8H [address], Kate Mejia MD, 250 mg at 11/20/17 1318    cloNIDine (CATAPRES) tablet 0 2 mg, 0 2 mg, Oral, Q24H, Adilia Daley MD, 0 2 mg at 11/20/17 1317    cloNIDine (CATAPRES) tablet 0 3 mg, 0 3 mg, Oral, BID, Adilia Daley MD, 0 3 mg at 11/20/17 0845    docusate sodium (COLACE) capsule 100 mg, 100 mg, Oral, BID, Venida Antoinette, CRNP, 100 mg at 11/20/17 1323    doxazosin (CARDURA) tablet 2 mg, 2 mg, Oral, HS, Adilia Daley MD, 2 mg at 11/19/17 2152    DULoxetine (CYMBALTA) delayed release capsule 60 mg, 60 mg, Oral, BID, Adilia Daley MD, 60 mg at 39/35/70 7592    folic acid (FOLVITE) tablet 1 mg, 1 mg, Oral, BID, Adilia Daley MD, 1 mg at 11/20/17 1317    heparin (porcine) subcutaneous injection 5,000 Units, 5,000 Units, Subcutaneous, Q8H [address], 5,000 Units at 11/20/17 1317 **AND** Platelet count, , , Once, Adilia Daley MD    hydrALAZINE (APRESOLINE) tablet 50 mg, 50 mg, Oral, TID, Adilia Daley MD, 50 mg at 11/20/17 0845    insulin glargine (LANTUS) subcutaneous injection 20 Units, 20 Units, Subcutaneous, Daily With Breakfast, Adilia Daley MD, 20 Units at 11/20/17 1318    insulin lispro (HumaLOG) 100 units/mL subcutaneous injection 1-5 Units, 1-5 Units, Subcutaneous, HS, Adilia Daley MD, 1 Units at 11/19/17 2322    insulin lispro (HumaLOG) 100 units/mL subcutaneous injection 1-6 Units, 1-6 Units, Subcutaneous, TID AC, 2 Units at 11/18/17 1649 **AND** Fingerstick Glucose (POCT), , , TID AC, Adilia Daley MD    lactulose 20 g/30 mL oral solution 20 g, 20 g, Oral, BID PRN, Mars England, CRNP, 20 g at 11/18/17 1648    levothyroxine tablet 88 mcg, 88 mcg, Oral, Early Morning, Adilia Daley MD, 88 mcg at 11/20/17 0531    LORazepam (ATIVAN) tablet 2 mg, 2 mg, Oral, BID PRN, Adilia Daley MD, 2 mg at 11/19/17 1657    metoprolol tartrate (LOPRESSOR) tablet 50 mg, 50 mg, Oral, BID, Adilia Daley MD, 50 mg at 11/20/17 0845    ondansetron (ZOFRAN) injection 4 mg, 4 mg, Intravenous, Q6H PRN, Adilia Daley MD, 4 mg at 11/17/17 0957    pantoprazole (PROTONIX) EC tablet 40 mg, 40 mg, Oral, BID AC, Adilia Daley MD, 40 mg at 11/20/17 0847    pravastatin (PRAVACHOL) tablet 80 mg, 80 mg, Oral, Daily, Adilia Daley MD, 80 mg at 11/20/17 1317    predniSONE tablet 5 mg, 5 mg, Oral, Daily, Adilia Daley MD, 5 mg at 11/20/17 1318    rOPINIRole (REQUIP) tablet 0 25 mg, 0 25 mg, Oral, BID, Adilia Daley MD, 0 25 mg at 11/20/17 1318    senna (SENOKOT) tablet 8 6 mg, 1 tablet, Oral, HS PRN, Adilia Daley MD, 8 6 mg at 11/17/17 1720    sertraline (ZOLOFT) tablet 100 mg, 100 mg, Oral, BID, Adilia Daley MD, 100 mg at 11/20/17 1318    tacrolimus (PROGRAF) capsule 3 mg, 3 mg, Oral, HS, Adilia Daley MD, 3 mg at 11/19/17 2151    tacrolimus (PROGRAF) capsule 4 mg, 4 mg, Oral, Daily, Adilia Daley MD, 4 mg at 11/20/17 0845    traZODone (DESYREL) tablet 150 mg, 150 mg, Oral, HS, Jesse Daley MD, 150 mg at 11/19/17 2151    zolpidem (AMBIEN) tablet 5 mg, 5 mg, Oral, HS PRN, Ziyad Lr MD    Laboratory Results:  Lab Results   Component Value Date    WBC 8 46 11/20/2017    HGB 11 2 (L) 11/20/2017    HCT 35 2 11/20/2017    MCV 91 11/20/2017     11/20/2017     Lab Results   Component Value Date    GLUCOSE 90 11/20/2017    CALCIUM 8 9 11/20/2017     11/20/2017    K 3 7 11/20/2017    CO2 32 11/20/2017     11/20/2017    BUN 42 (H) 11/20/2017    CREATININE 1 68 (H) 11/20/2017     Lab Results   Component Value Date    CALCIUM 8 9 11/20/2017    PHOS 3 8 11/17/2017     No results found for: LABPROT

## 2017-11-21 ENCOUNTER — TRANSCRIBE ORDERS (OUTPATIENT)
Dept: ADMINISTRATIVE | Age: 52
End: 2017-11-21

## 2017-11-21 ENCOUNTER — APPOINTMENT (OUTPATIENT)
Dept: LAB | Age: 52
End: 2017-11-21
Payer: MEDICARE

## 2017-11-21 DIAGNOSIS — N17.9 ACUTE RENAL FAILURE, UNSPECIFIED ACUTE RENAL FAILURE TYPE (HCC): Primary | ICD-10-CM

## 2017-11-21 DIAGNOSIS — N17.9 ACUTE RENAL FAILURE, UNSPECIFIED ACUTE RENAL FAILURE TYPE (HCC): ICD-10-CM

## 2017-11-21 LAB — BACTERIA BLD CULT: NORMAL

## 2017-11-21 PROCEDURE — 80197 ASSAY OF TACROLIMUS: CPT

## 2017-11-21 PROCEDURE — 36415 COLL VENOUS BLD VENIPUNCTURE: CPT

## 2017-11-23 LAB
PTH RELATED PROT SERPL-SCNC: <1.1 PMOL/L
TACROLIMUS BLD LC/MS/MS-MCNC: 7.4 NG/ML (ref 2–20)

## 2017-11-27 ENCOUNTER — GENERIC CONVERSION - ENCOUNTER (OUTPATIENT)
Dept: OTHER | Facility: OTHER | Age: 52
End: 2017-11-27

## 2017-11-29 ENCOUNTER — GENERIC CONVERSION - ENCOUNTER (OUTPATIENT)
Dept: OTHER | Facility: OTHER | Age: 52
End: 2017-11-29

## 2017-12-01 ENCOUNTER — GENERIC CONVERSION - ENCOUNTER (OUTPATIENT)
Dept: OTHER | Facility: OTHER | Age: 52
End: 2017-12-01

## 2017-12-01 DIAGNOSIS — Z94.0 HISTORY OF KIDNEY TRANSPLANT: ICD-10-CM

## 2017-12-01 DIAGNOSIS — E11.21 TYPE 2 DIABETES MELLITUS WITH DIABETIC NEPHROPATHY (HCC): ICD-10-CM

## 2017-12-01 DIAGNOSIS — R80.9 PROTEINURIA: ICD-10-CM

## 2017-12-01 DIAGNOSIS — Z12.11 ENCOUNTER FOR SCREENING FOR MALIGNANT NEOPLASM OF COLON: ICD-10-CM

## 2017-12-01 DIAGNOSIS — N25.81 SECONDARY HYPERPARATHYROIDISM OF RENAL ORIGIN (HCC): ICD-10-CM

## 2017-12-01 DIAGNOSIS — E87.2 ACIDOSIS: ICD-10-CM

## 2017-12-01 DIAGNOSIS — I12.9 HYPERTENSIVE CHRONIC KIDNEY DISEASE WITH STAGE 1 THROUGH STAGE 4 CHRONIC KIDNEY DISEASE, OR UNSPECIFIED CHRONIC KIDNEY DISEASE: ICD-10-CM

## 2017-12-01 DIAGNOSIS — N18.4 CHRONIC KIDNEY DISEASE, STAGE IV (SEVERE) (HCC): ICD-10-CM

## 2017-12-01 DIAGNOSIS — E03.9 HYPOTHYROIDISM: ICD-10-CM

## 2017-12-06 ENCOUNTER — GENERIC CONVERSION - ENCOUNTER (OUTPATIENT)
Dept: OTHER | Facility: OTHER | Age: 52
End: 2017-12-06

## 2017-12-07 ENCOUNTER — GENERIC CONVERSION - ENCOUNTER (OUTPATIENT)
Dept: OTHER | Facility: OTHER | Age: 52
End: 2017-12-07

## 2017-12-07 ENCOUNTER — TRANSCRIBE ORDERS (OUTPATIENT)
Dept: ADMINISTRATIVE | Age: 52
End: 2017-12-07

## 2017-12-07 ENCOUNTER — APPOINTMENT (OUTPATIENT)
Dept: LAB | Age: 52
End: 2017-12-07
Payer: MEDICARE

## 2017-12-07 DIAGNOSIS — E11.21 TYPE 2 DIABETES MELLITUS WITH DIABETIC NEPHROPATHY (HCC): ICD-10-CM

## 2017-12-07 DIAGNOSIS — N17.9 ACUTE RENAL FAILURE, UNSPECIFIED ACUTE RENAL FAILURE TYPE (HCC): Primary | ICD-10-CM

## 2017-12-07 DIAGNOSIS — N17.9 ACUTE RENAL FAILURE, UNSPECIFIED ACUTE RENAL FAILURE TYPE (HCC): ICD-10-CM

## 2017-12-07 DIAGNOSIS — I10 ESSENTIAL HYPERTENSION, MALIGNANT: ICD-10-CM

## 2017-12-07 DIAGNOSIS — I10 ESSENTIAL HYPERTENSION, MALIGNANT: Primary | ICD-10-CM

## 2017-12-07 DIAGNOSIS — E03.9 HYPOTHYROIDISM: ICD-10-CM

## 2017-12-07 LAB
ALBUMIN SERPL BCP-MCNC: 2.6 G/DL (ref 3.5–5)
ALP SERPL-CCNC: 109 U/L (ref 46–116)
ALT SERPL W P-5'-P-CCNC: 20 U/L (ref 12–78)
ANION GAP SERPL CALCULATED.3IONS-SCNC: 8 MMOL/L (ref 4–13)
AST SERPL W P-5'-P-CCNC: 17 U/L (ref 5–45)
BILIRUB SERPL-MCNC: 0.27 MG/DL (ref 0.2–1)
BUN SERPL-MCNC: 33 MG/DL (ref 5–25)
CALCIUM SERPL-MCNC: 9.2 MG/DL (ref 8.3–10.1)
CHLORIDE SERPL-SCNC: 109 MMOL/L (ref 100–108)
CO2 SERPL-SCNC: 21 MMOL/L (ref 21–32)
CREAT SERPL-MCNC: 1.6 MG/DL (ref 0.6–1.3)
EST. AVERAGE GLUCOSE BLD GHB EST-MCNC: 105 MG/DL
GFR SERPL CREATININE-BSD FRML MDRD: 37 ML/MIN/1.73SQ M
GLUCOSE P FAST SERPL-MCNC: 108 MG/DL (ref 65–99)
HBA1C MFR BLD: 5.3 % (ref 4.2–6.3)
POTASSIUM SERPL-SCNC: 4.2 MMOL/L (ref 3.5–5.3)
PROT SERPL-MCNC: 8.6 G/DL (ref 6.4–8.2)
SODIUM SERPL-SCNC: 138 MMOL/L (ref 136–145)
T4 FREE SERPL-MCNC: 0.75 NG/DL (ref 0.76–1.46)
TSH SERPL DL<=0.05 MIU/L-ACNC: 6.41 UIU/ML (ref 0.36–3.74)

## 2017-12-07 PROCEDURE — 84439 ASSAY OF FREE THYROXINE: CPT

## 2017-12-07 PROCEDURE — 83036 HEMOGLOBIN GLYCOSYLATED A1C: CPT

## 2017-12-07 PROCEDURE — 80053 COMPREHEN METABOLIC PANEL: CPT

## 2017-12-07 PROCEDURE — 36415 COLL VENOUS BLD VENIPUNCTURE: CPT

## 2017-12-07 PROCEDURE — 84443 ASSAY THYROID STIM HORMONE: CPT

## 2018-01-07 DIAGNOSIS — D64.9 ANEMIA: ICD-10-CM

## 2018-01-07 DIAGNOSIS — E03.9 HYPOTHYROIDISM: ICD-10-CM

## 2018-01-07 DIAGNOSIS — N18.4 CHRONIC KIDNEY DISEASE, STAGE IV (SEVERE) (HCC): ICD-10-CM

## 2018-01-07 DIAGNOSIS — Z94.0 HISTORY OF KIDNEY TRANSPLANT: ICD-10-CM

## 2018-01-07 DIAGNOSIS — C90.00 MULTIPLE MYELOMA NOT HAVING ACHIEVED REMISSION (HCC): ICD-10-CM

## 2018-01-09 NOTE — MISCELLANEOUS
Message   Recorded as Task   Date: 03/08/2016 04:52 PM, Created By: Dustin Thakur   Task Name: Miscellaneous   Assigned To: Cheryl Blackmon   Regarding Patient: Holly Salazar, Status: Active   CommentZora Peto - 08 Mar 2016 4:52 PM     TASK CREATED  cbc/ ferr/ tibc/iron in 2 weeks   Cheryl Blackmon - 09 Mar 2016 8:30 AM     TASK REASSIGNED: Previously Assigned To [facility],Team   I spoke with the patient and she is aware of the above  I mailed her the lab order  maulik      Active Problems    1  Abnormal EKG (794 31) (R94 31)   2  Acid reflux disease (530 81) (K21 9)   3  Acute on chronic diastolic congestive heart failure (428 33,428 0) (I50 33)   4  Anemia (285 9) (D64 9)   5  Atrial fibrillation (427 31) (I48 91)   6  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)   7  Benign hypertensive CKD (403 10) (I12 9)   8  Bilateral leg edema (782 3) (R60 0)   9  Bruit of left carotid artery (785 9) (R09 89)   10  Cataract (366 9) (H26 9)   11  Chronic diastolic congestive heart failure (428 32,428 0) (I50 32)   12  Chronic kidney disease, stage 3 (585 3) (N18 3)   13  Cocaine abuse in remission (305 63) (F14 10)   14  Cognitive changes (799 59) (R41 89)   15  Constipation (564 00) (K59 00)   16  Diabetic Gastropathy (537 9)   17  Diabetic Nephropathy (583 81)   18  Diabetic polyneuropathy (250 60,357 2) (E11 42)   19  Diabetic retinopathy (250 50,362 01) (E11 319)   20  Diabetic Ulcer Of The Left Foot (250 80)   21  ROD (dyspnea on exertion) (786 09) (R06 09)   22  Drug-induced Essential Tremor (333 1)   23  Encounter for screening mammogram for breast cancer (V76 12) (Z12 31)   24  Esophagitis, reflux (530 11) (K21 0)   25  External Hemorrhoids (455 3)   26  FELIPE (generalized anxiety disorder) (300 02) (F41 1)   27  H/O kidney transplant (V42 0) (Z94 0)   28  Heart palpitations (785 1) (R00 2)   29  Hyperlipidemia (272 4) (E78 5)   30  Hyperplastic colon polyp (211 3) (K63 5)   31   Hypertension (401 9) (I10) 32  Hypothyroidism (244 9) (E03 9)   33  Increased white blood cell count (288 60) (D72 829)   34  Insomnia (780 52) (G47 00)   35  Irritable bowel syndrome (564 1) (K58 9)   36  Major depressive disorder, recurrent episode, in full remission (296 36) (F33 42)   37  Memory loss (780 93) (R41 3)   38  Metabolic acidosis (487 7) (E87 2)   39  MGUS (monoclonal gammopathy of unknown significance) (273 1) (D47 2)   40  Neck pain (723 1) (M54 2)   41  Nonrheumatic aortic valve insufficiency (424 1) (I35 1)   42  Osteopenia (733 90) (M85 80)   43  Osteoporosis (733 00) (M81 0)   44  Peripheral vascular disease (443 9) (I73 9)   45  Post traumatic stress disorder (309 81) (F43 10)   46  Proteinuria (791 0) (R80 9)   47  Rectal polyp (569 0) (K62 1)   48  Restless legs syndrome (333 94) (G25 81)   49  Secondary hyperparathyroidism, renal (588 81) (N25 81)   50  Sinus Tachycardia (427 89)   51  SOB (shortness of breath) (786 05) (R06 02)   52  Tremor (781 0) (R25 1)   53  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 92) (G99 42)    Current Meds   1  AmLODIPine Besylate 10 MG Oral Tablet; take 1/2 tab  BID; Therapy: 81IGZ3854 to (Evaluate:22Lmy5031)  Requested for: 31Dll9004 Recorded   2  ARIPiprazole 20 MG Oral Tablet; TAKE 1 TABLET AT BEDTIME; Therapy: 98FKD9473 to (Evaluate:44Srp4051)  Requested for: 72VKJ6057; Last   Rx:00Oxe0123 Ordered   3  Aspirin 81 MG Oral Tablet; TAKE 1 TABLET DAILY; Therapy: 76CBN4223 to (Evaluate:19Jun2016) Recorded   4  Carafate 1 GM/10ML Oral Suspension; Therapy: (0699 982 13 20) to Recorded   5  Citracal TABS; Take one tablet twice daily Recorded   6  CloNIDine HCl - 0 1 MG Oral Tablet; Take three tablets every morning, two tablets every   noon, and three tablets every evening; Therapy: 76ASI7056 to (Evaluate:24Uyd3354)  Requested for: 75IEC1538; Last   Rx:11Kln7491 Ordered   7   DULoxetine HCl - 60 MG Oral Capsule Delayed Release Particles (Cymbalta); TAKE 1   CAPSULE TWICE DAILY; Therapy: 83JDO3129 to (Evaluate:73Ttm5071)  Requested for: 34OPU7267; Last   Rx:44Cml3478 Ordered   8  Folic Acid 1 MG Oral Tablet; TAKE 1 TABLET DAILY AS DIRECTED; Therapy: 55RDZ8540 to (Evaluate:72Geg7701)  Requested for: 77LOE6618; Last   Rx:36Arq7976 Ordered   9  Furosemide 40 MG Oral Tablet; Take one tablet every morning and 1/2 tablet every   evening; Therapy: 84EZF2233 to (Marilou Lauren)  Requested for: 33MHM6924 Recorded   10  Generlac 10 GM/15ML SYRP Recorded   11  HydrALAZINE HCl - 50 MG Oral Tablet; TAKE 1 TABLET 3 TIMES DAILY; Therapy: 52HPM6549 to (Evaluate:26Jan2017)  Requested for: 35ATW0745; Last    Rx:01Feb2016 Ordered   12  Hydrocodone-Acetaminophen 5-325 MG Oral Tablet; TAKE 1 TABLET EVERY 6 HOURS    AS NEEDED FOR PAIN;    Therapy: 09LOV1951 to (Evaluate:07Jan2016); Last Rx:09Ced1949 Ordered   13  Levothyroxine Sodium 88 MCG Oral Tablet (Synthroid); take 1 tablet every day; Therapy: 23OCB7624 to (Last Rx:55Fbh7524)  Requested for: 02Feb2016 Ordered   14  LORazepam 2 MG Oral Tablet; TAKE 1 TABLET TWICE DAILY AS NEEDED; Therapy: 07DLB2576 to (Evaluate:58Wne4853); Last Rx:58Dwh2981 Ordered   15  Magnesium  (64 Mg) MG TBCR; TAKE 1 TABLET DAILY; Therapy: 14MIW6592 to (Evaluate:44Kiy0106); Last Rx:05Jun2013 Ordered   16  Metoprolol Tartrate 50 MG Oral Tablet; take 1 tablet by mouth twice daily; Therapy: 47EMN4317 to (Evaluate:06Jun2016)  Requested for: 58IVD9889; Last    Rx:08Mar2016 Ordered   17  OneTouch Ultra Blue In Vitro Strip; TEST EVERY DAY AND AS NEEDED; Therapy: 71JNO7501 to (Evaluate:19Apr2016)  Requested for: 94Rys3880; Last    Rx:62Npy5468 Ordered   18  Pantoprazole Sodium 40 MG Oral Tablet Delayed Release; TAKE 1 TABLET DAILY; Therapy: 84BDX1022 to (Evaluate:02Apr2016)  Requested for: 42NKG7924; Last    Rx:54Jvk5560 Ordered   19  Pravastatin Sodium 80 MG Oral Tablet; take 1 tablet by mouth every day;     Therapy: 35JGI0568 to (Evaluate:38Dee7148)  Requested for: 26Qht4005; Last    Rx:80Suv6568 Ordered   20  PredniSONE 5 MG Oral Tablet; Take 1-1/2 tablets daily; Therapy: 16VML0630 to (Evaluate:14Mar2016)  Requested for: 20Mar2015; Last    Rx:20Mar2015 Ordered   21  ROPINIRole HCl - 0 25 MG Oral Tablet; take 1 tablet by mouth twice daily; Therapy: 85NYU0933 to (Evaluate:14Vkf2593)  Requested for: 42WHB3398; Last    Rx:74Jgl4994 Ordered   22  Sertraline HCl - 100 MG Oral Tablet; TAKE 2 TABLETS DAILY; Therapy: 46KKN9446 to (Evaluate:21Udr2914)  Requested for: 33KHZ9642; Last    Rx:15Mqk9186 Ordered   23  Sodium Bicarbonate 650 MG Oral Tablet; Take one tablet daily; Therapy: 09WGF2663 to (Evaluate:61Zco5338)  Requested for: 53COE4836 Recorded   24  Tacrolimus 1 MG Oral Capsule; TAKE 4 CAPSULES every morning and 3 capsules every    evening; Therapy: 11VIZ3103 to (Evaluate:20Thp9475)  Requested for: 38NAX2620; Last    Rx:04Mar2016 Ordered   25  TraZODone HCl - 150 MG Oral Tablet; TAKE 1 TABLET AT BEDTIME; Therapy: 29DLS1556 to (Evaluate:95Xam9148)  Requested for: 01GFP1782; Last    Rx:20Gpr3582 Ordered   26  Vitamin D3 3000 UNIT Oral Tablet; 1 TAB DAILY; Therapy: 31SJC7284 to (Evaluate:66Npo9456) Recorded    Allergies    1  Penicillins   2  Morphine Derivatives   3  Duricef TABS    Plan  Anemia, Benign hypertensive CKD, Chronic kidney disease, stage 3    · (1) CBC/ PLT (NO DIFF); Status:Unauthorized - Requires Signature; Requested  for:23Mar2016;    · (1) FERRITIN; Status:Unauthorized - Requires Signature; Requested for:23Mar2016;    · (1) IRON; Status:Unauthorized - Requires Signature; Requested for:23Mar2016;    · (1) TIBC; Status:Unauthorized - Requires Signature;  Requested for:23Mar2016;     Signatures   Electronically signed by : EDGAR Griffin ; Mar  9 2016 11:29AM EST

## 2018-01-10 NOTE — MISCELLANEOUS
Message   Recorded as Task   Date: 04/13/2017 09:48 AM, Created By: Torsten Esteban   Task Name: Provider to Provider   Assigned To: Bibi Castañeda   Regarding Patient: Rylie Peter, Status: In Progress   Rubina Sherri - 13 Apr 2017 9:48 AM     TASK CREATED  please verify if Tac level drawn at correct time/after dose  If it was, increase Prograf from 4am/3pm t(verify this is dose she is taking) to 4mg am and 4 mg pm w f/u Prograf level in 10 days   Ana Maria Stark - 13 Apr 2017 10:13 AM     TASK IN PROGRESS   Ana Maria Stark - 13 Apr 2017 10:14 AM     TASK EDITED  Left message for pt to call the office  [facility] 4/13/2017   I did verify that tac level was drawn at correct time/ after dose  She is currently taking 4 and 3 which has been increased to 4 and 4 with a f/u tac level to be done in 10 days  [facility] 4/13/2017      Active Problems    1  Abnormal EKG (794 31) (R94 31)   2  Acid reflux disease (530 81) (K21 9)   3  Acute kidney injury superimposed on CKD (866 00,585 9) (N17 9,N18 9)   4  Allergic urticaria (708 0) (L50 0)   5  Anemia (285 9) (D64 9)   6  Antibiotic prophylaxis for dental procedure indicated due to prior joint replacement   (V58 62) (Z79 2)   7  Atrial fibrillation (427 31) (I48 91)   8  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)   9  Benign hypertensive CKD (403 10) (I12 9)   10  Bilateral impacted cerumen (380 4) (H61 23)   11  Bilateral leg edema (782 3) (R60 0)   12  Bruit of left carotid artery (785 9) (R09 89)   13  Cataract (366 9) (H26 9)   14  Chronic diastolic congestive heart failure (428 32,428 0) (I50 32)   15  Chronic kidney disease, stage 4 (severe) (585 4) (N18 4)   16  Cocaine abuse in remission (305 63) (F14 10)   17  Cognitive changes (799 59) (R41 89)   18  Constipation (564 00) (K59 00)   19  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, without long-term    current use of insulin (250 61) (E10 49)   20   Diabetes mellitus with nephropathy (250 40,583 81) (E11 21)   21  Diabetic Gastropathy (537 9)   22  Diabetic polyneuropathy (250 60,357 2) (E11 42)   23  Diabetic retinopathy (250 50,362 01) (E11 319)   24  Diabetic Ulcer Of The Left Foot (250 80)   25  Diastolic dysfunction (788 0) (I51 9)   26  ROD (dyspnea on exertion) (786 09) (R06 09)   27  Drug-induced Essential Tremor (333 1)   28  Dysuria (788 1) (R30 0)   29  Encephalopathy (348 30) (G93 40)   30  Encounter for screening mammogram for breast cancer (V76 12) (Z12 31)   31  Esophagitis, reflux (530 11) (K21 0)   32  External Hemorrhoids (455 3)   33  Fatigue (780 79) (R53 83)   34  FELIPE (generalized anxiety disorder) (300 02) (F41 1)   35  H/O kidney transplant (V42 0) (Z94 0)   36  Heart palpitations (785 1) (R00 2)   37  Hospital discharge follow-up (V67 59) (Z09)   38  Hyperlipidemia (272 4) (E78 5)   39  Hyperplastic colon polyp (211 3) (K63 5)   40  Hypertension (401 9) (I10)   41  Hyponatremia (276 1) (E87 1)   42  Hypothyroidism (244 9) (E03 9)   43  Increased frequency of urination (788 41) (R35 0)   44  Increased white blood cell count (288 60) (D72 829)   45  Insomnia (780 52) (G47 00)   46  Irritable bowel syndrome (564 1) (K58 9)   47  Major depressive disorder, recurrent episode, in full remission (296 36) (F33 42)   48  Memory loss (780 93) (R41 3)   49  Memory loss or impairment (780 93) (R41 3)   50  Metabolic acidosis (344 8) (E87 2)   51  MGUS (monoclonal gammopathy of unknown significance) (273 1) (D47 2)   52  Neck pain (723 1) (M54 2)   53  Need for influenza vaccination (V04 81) (Z23)   54  Nonrheumatic aortic valve insufficiency (424 1) (I35 1)   55  OAB (overactive bladder) (596 51) (N32 81)   56  Osteopenia (733 90) (M85 80)   57  Osteoporosis (733 00) (M81 0)   58  Other calculus in bladder (594 1) (N21 0)   59  Peripheral vascular disease (443 9) (I73 9)   60  Post traumatic stress disorder (309 81) (F43 10)   61  Preop general physical exam (V72 83) (Z01 818)   62  Preoperative cardiovascular examination (V72 81) (Z01 810)   63  Proteinuria (791 0) (R80 9)   64  Rectal polyp (569 0) (K62 1)   65  Recurrent UTI (599 0) (N39 0)   66  Restless legs syndrome (333 94) (G25 81)   67  Secondary hyperparathyroidism, renal (588 81) (N25 81)   68  Sinus Tachycardia (427 89)   69  Snoring (786 09) (R06 83)   70  SOB (shortness of breath) (786 05) (R06 02)   71  Tremor (781 0) (R25 1)   72  Unsteady gait (781 2) (R26 81)   73  UTI (urinary tract infection) (599 0) (N39 0)    Current Meds   1  AmLODIPine Besylate 10 MG Oral Tablet; take 1/2 tab  BID; Therapy: 30FZL0382 to (Last Rx:07Apr2017)  Requested for: 07Apr2017 Ordered   2  ARIPiprazole 30 MG Oral Tablet; TAKE 1 TABLET AT BEDTIME; Therapy: 39WAZ6806 to (Evaluate:41Gxk8943)  Requested for: 44NJX4184; Last   Rx:16Nov2016 Ordered   3  Aspirin 81 MG TABS; TAKE 1 TABLET DAILY; Therapy: 18SOH0279 to (Evaluate:19Jun2016) Recorded   4  Catapres 0 1 MG Oral Tablet (CloNIDine HCl); take 3 tab  in am, 2 tab at noon, 3 tab  at   night; Therapy: 27YVV2327 to (Last Rx:22Mar2017)  Requested for: 23Mar2017 Ordered   5  Clindamycin HCl - 300 MG Oral Capsule; TAKE 2 CAPSULES 1 HOUR PRIOR TO   DENTAL APPOINTMENT; Therapy: 67ZVV7266 to ([phone])  Requested for: 13Apr2017; Last   Rx:07Apr2017 Ordered   6  Debrox 6 5 % Otic Solution; Instill 5-10 drops twice daily for up to 4 days; Therapy: 42GXC8287 to (Last Rx:28Mar2017)  Requested for: 28Mar2017 Ordered   7  Doxazosin Mesylate 1 MG Oral Tablet (Cardura); TAKE 1 TABLET BY MOUTH EVERY   NIGHT AT BEDTIME; Therapy: 21RDM4376 to (Evaluate:85Hmz2770)  Requested for: 61UQI7489; Last   Rx:04Apr2017 Ordered   8  Doxazosin Mesylate 1 MG Oral Tablet; TAKE 1 TABLET Bedtime  Requested for:   32QCF5812; Last Rx:05Apr2017 Ordered   9  DULoxetine HCl - 60 MG Oral Capsule Delayed Release Particles (Cymbalta); TAKE 1   CAPSULE TWICE DAILY;    Therapy: 40DOT0282 to (Evaluate:70Xrg0504)  Requested for: 16ZPN9819; Last   Rx:02Mar2017 Ordered   10  Flonase Allergy Relief 50 MCG/ACT Nasal Suspension (Fluticasone Propionate); USE 2    SPRAYS IN EACH NOSTRIL ONCE DAILY; Therapy: 26XGW9850 to Recorded   11  Folic Acid 1 MG Oral Tablet; TAKE 1 TABLET DAILY AS DIRECTED; Therapy: 84HHO1541 to (03 17 74 30 53)  Requested for: 64IQR9722; Last    Rx:31Mqw6226 Ordered   12  Furosemide 20 MG Oral Tablet; take 1/2 tab  daily; Therapy: 76Mux1280 to Recorded   13  Generlac 10 GM/15ML Oral Solution; TAKE 15 ML TWICE DAILY; Therapy: 87YHJ5393 to Recorded   14  HydrALAZINE HCl - 50 MG Oral Tablet; TAKE 1 TABLET 3 TIMES DAILY; Therapy: 50FQW7029 to ([facility])  Requested for: 20JAM6888; Last    Rx:13Mar2017 Ordered   15  Levothyroxine Sodium 88 MCG Oral Tablet; take 1 tablet by mouth daily; Therapy: 67ZFT1321 to (Evaluate:15Mar2017)  Requested for: 30Zvc6961; Last    Rx:45Boq0912 Ordered   16  LORazepam 2 MG Oral Tablet; TAKE 1 TABLET 3 TIMES DAILY AS NEEDED; Therapy: 46GTM1856 to (Evaluate:15May2017)  Requested for: 30KDD2826; Last    Rx:16Nov2016 Ordered   17  Magnesium 200 MG Oral Tablet; Therapy: 40VYR2483 to Recorded   18  Metoprolol Tartrate 50 MG Oral Tablet; take 1 tablet by mouth twice daily; Therapy: 23ULN9620 to (Evaluate:09Jun2017)  Requested for: 23UVN4305; Last    Rx:14Jun2016 Ordered   19  OneTouch Ultra Blue In [employer]; test twice daily; Therapy: 12KEF0046 to (Evaluate:24Mar2018)  Requested for: 16ANS9187; Last    Rx:29Mar2017 Ordered   20  OneTouch UltraSoft Lancets Miscellaneous; test twice daily; Therapy: 01ASG7071 to ([facility])  Requested for: 12YUH2709; Last    Rx:09Mar2016 Ordered   21  Pantoprazole Sodium 40 MG Oral Tablet Delayed Release; take 1 tablet by mouth every    day; Therapy: 28IPY1632 to (Evaluate:84Buo3112)  Requested for: 05BYP7185; Last    Rx:22Lmj2314 Ordered   22   Pravastatin Sodium 80 MG Oral Tablet; take 1 tablet by mouth every day;    Therapy: 94RMO1637 to (Evaluate:37Mrh4502)  Requested for: 90KAQ9514; Last    Rx:22Jan2017 Ordered   23  PredniSONE 5 MG Oral Tablet; TAKE ONE AND 1/2 TABLETS BY MOUTH DAILY; Therapy: 94DJX8666 to (Loye Maxim)  Requested for: 16BLT4052; Last    Rx:52Jir3867 Ordered   24  ROPINIRole HCl - 0 25 MG Oral Tablet; take 1 tablet by mouth twice daily; Therapy: 72IPI2484 to (Evaluate:56Icj8900)  Requested for: 83ZWV9706; Last    Rx:30Jun2016 Ordered   25  Sertraline HCl - 100 MG Oral Tablet; TAKE 2 TABLETS DAILY; Therapy: 38WYE8908 to (Evaluate:56Duq4048)  Requested for: 63TYB1020; Last    Rx:16Nov2016 Ordered   26  Sodium Bicarbonate 650 MG Oral Tablet; Take one tablet daily; Therapy: 03QVM4675 to (Evaluate:12Apr2016)  Requested for: 03XQY0411 Recorded   27  Tacrolimus 1 MG Oral Capsule; Take 4 in the morning and 3 in the evening  Requested    for: 16GOY0950; Last Rx:09Xwz0059 Ordered   28  TraZODone HCl - 150 MG Oral Tablet; TAKE 1 TABLET AT BEDTIME; Therapy: 35YNT2091 to (Evaluate:65Cel2956)  Requested for: 64YPD9232; Last    Rx:02Mar2017 Ordered   29  VESIcare 5 MG Oral Tablet; take one tablet daily; Therapy: 94Moo3946 to Recorded   30  Vitamin D3 1000 UNIT Oral Capsule; TAKE 3 PILLS AT NOON BY MOUTH; Therapy: (Recorded:17Jun2016) to Recorded    Allergies    1  Penicillins   2  Morphine Derivatives   3  Duricef TABS   4  Myrbetriq TB24    Plan  H/O kidney transplant    · Tacrolimus 1 MG Oral Capsule (Prograf);  Take 4 in the morning and 4 in the  evening    Signatures   Electronically signed by : EDGAR Edouard ; Apr 14 2017 12:20PM EST

## 2018-01-10 NOTE — MISCELLANEOUS
Message   Recorded as Task   Date: 03/20/2017 04:08 PM, Created By: Roberto Chandra   Task Name: Miscellaneous   Assigned To: Autumn Page   Regarding Patient: Francisco Urias, Status: In Progress   Comment:    TheresaSundeep rogers - 20 Mar 2017 4:08 PM     TASK CREATED  based on BP log sent in, no changes  Needs stacey't w D or V in 2-3 weeks before my May stacey't  Please make sure having monthly txplnt labs   LincolnAna Maria - 20 Mar 2017 4:33 PM     TASK IN PROGRESS   Jose Waggoner has been scheduled to see se Thiago Michael on 4/5, she is having monthly txplnt labs drawn and April's labs have already been mailed out  [facility] 3/20/2017      Active Problems    1  Abnormal EKG (794 31) (R94 31)   2  Acid reflux disease (530 81) (K21 9)   3  Acute kidney injury superimposed on CKD (866 00,585 9) (N17 9,N18 9)   4  Allergic urticaria (708 0) (L50 0)   5  Anemia (285 9) (D64 9)   6  Antibiotic prophylaxis for dental procedure indicated due to prior joint replacement   (V58 62) (Z79 2)   7  Atrial fibrillation (427 31) (I48 91)   8  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)   9  Benign hypertensive CKD (403 10) (I12 9)   10  Bilateral leg edema (782 3) (R60 0)   11  Bruit of left carotid artery (785 9) (R09 89)   12  Cataract (366 9) (H26 9)   13  Chronic diastolic congestive heart failure (428 32,428 0) (I50 32)   14  Chronic kidney disease, stage 4 (severe) (585 4) (N18 4)   15  Cocaine abuse in remission (305 63) (F14 10)   16  Cognitive changes (799 59) (R41 89)   17  Constipation (564 00) (K59 00)   18  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, without long-term    current use of insulin (250 61) (E10 49)   19  Diabetes mellitus with nephropathy (250 40,583 81) (E11 21)   20  Diabetic Gastropathy (537 9)   21  Diabetic polyneuropathy (250 60,357 2) (E11 42)   22  Diabetic retinopathy (250 50,362 01) (E11 319)   23  Diabetic Ulcer Of The Left Foot (250 80)   24  Diastolic dysfunction (672 9) (I51 9)   25   ROD (dyspnea on exertion) (786 09) (R06 09)   26  Drug-induced Essential Tremor (333 1)   27  Dysuria (788 1) (R30 0)   28  Encephalopathy (348 30) (G93 40)   29  Encounter for screening mammogram for breast cancer (V76 12) (Z12 31)   30  Esophagitis, reflux (530 11) (K21 0)   31  External Hemorrhoids (455 3)   32  Fatigue (780 79) (R53 83)   33  FELIPE (generalized anxiety disorder) (300 02) (F41 1)   34  H/O kidney transplant (V42 0) (Z94 0)   35  Heart palpitations (785 1) (R00 2)   36  Hospital discharge follow-up (V67 59) (Z09)   40  Hyperlipidemia (272 4) (E78 5)   38  Hyperplastic colon polyp (211 3) (K63 5)   39  Hypertension (401 9) (I10)   40  Hyponatremia (276 1) (E87 1)   41  Hypothyroidism (244 9) (E03 9)   42  Increased frequency of urination (788 41) (R35 0)   43  Increased white blood cell count (288 60) (D72 829)   44  Insomnia (780 52) (G47 00)   45  Irritable bowel syndrome (564 1) (K58 9)   46  Major depressive disorder, recurrent episode, in full remission (296 36) (F33 42)   47  Memory loss (780 93) (R41 3)   48  Memory loss or impairment (780 93) (R41 3)   49  Metabolic acidosis (161 5) (E87 2)   50  MGUS (monoclonal gammopathy of unknown significance) (273 1) (D47 2)   51  Neck pain (723 1) (M54 2)   52  Need for influenza vaccination (V04 81) (Z23)   53  Nonrheumatic aortic valve insufficiency (424 1) (I35 1)   54  OAB (overactive bladder) (596 51) (N32 81)   55  Osteopenia (733 90) (M85 80)   56  Osteoporosis (733 00) (M81 0)   57  Other calculus in bladder (594 1) (N21 0)   58  Peripheral vascular disease (443 9) (I73 9)   59  Post traumatic stress disorder (309 81) (F43 10)   60  Preop general physical exam (V72 83) (Z01 818)   61  Preoperative cardiovascular examination (V72 81) (Z01 810)   62  Proteinuria (791 0) (R80 9)   63  Rectal polyp (569 0) (K62 1)   64  Recurrent UTI (599 0) (N39 0)   65  Restless legs syndrome (333 94) (G25 81)   66  Secondary hyperparathyroidism, renal (588 81) (N25 81)   67  Sinus Tachycardia (427 89)   68  Snoring (786 09) (R06 83)   69  SOB (shortness of breath) (786 05) (R06 02)   70  Tremor (781 0) (R25 1)   71  Unsteady gait (781 2) (R26 81)   72  UTI (urinary tract infection) (599 0) (N39 0)    Current Meds   1  AmLODIPine Besylate 10 MG Oral Tablet; take 1/2 tab  BID; Therapy: 38TGL8812 to (Evaluate:30Apr2017)  Requested for: 58OUX7066 Recorded   2  ARIPiprazole 30 MG Oral Tablet; TAKE 1 TABLET AT BEDTIME; Therapy: 52VRC8593 to (Evaluate:15Jto7458)  Requested for: 06SDM7607; Last   Rx:16Nov2016 Ordered   3  Aspirin 81 MG TABS; TAKE 1 TABLET DAILY; Therapy: 19VHV6781 to (Evaluate:19Jun2016) Recorded   4  Caltrate 600 TABS; TAKE 1 TABLET TWICE DAILY; Therapy: (Recorded:17Jun2016) to Recorded   5  Catapres 0 1 MG Oral Tablet; take 3 tab  in am, 2 tab at noon, 3 tab  at night; Therapy: 97WWQ8125 to  Requested for: 92EKB2789 Recorded   6  Clindamycin HCl - 300 MG Oral Capsule; TAKE 2 CAPSULES 1 HOUR PRIOR TO   DENTAL APPOINTMENT; Therapy: 78DZQ0333 to (Evaluate:21Oct2016)  Requested for: 94RXS2638; Last   Rx:20Oct2016 Ordered   7  Doxazosin Mesylate 1 MG Oral Tablet; TAKE 1 TABLET AT BEDTIME; Therapy: (Recorded:17Jun2016) to Recorded   8  DULoxetine HCl - 60 MG Oral Capsule Delayed Release Particles; TAKE 1 CAPSULE   TWICE DAILY; Therapy: 32UPZ2447 to (Evaluate:37Euk5710)  Requested for: 78HKW1623; Last   Rx:02Mar2017 Ordered   9  Folic Acid 1 MG Oral Tablet; TAKE 1 TABLET DAILY AS DIRECTED; Therapy: 62CBV0074 to (Evaluate:07Oct2017)  Requested for: 78TAR7860; Last   Rx:12Oct2016 Ordered   10  Furosemide 20 MG Oral Tablet; take 1/2 tab  daily; Therapy: 00Myu4770 to Recorded   11  HydrALAZINE HCl - 50 MG Oral Tablet; TAKE 1 TABLET 3 TIMES DAILY; Therapy: 08WQC1990 to (Laura Console)  Requested for: 51PTW4291; Last    Rx:13Mar2017 Ordered   12  Lactulose 10 GM/15ML Oral Solution; TAKE 30 ML DAILY  Requested for: 89Txa8187;    Last Rx:44Pst6404 Ordered   13  Levothyroxine Sodium 88 MCG Oral Tablet; take 1 tablet by mouth daily; Therapy: 67NAR3315 to (Evaluate:15Mar2017)  Requested for: 42Agv0186; Last    Rx:41Kbr0662 Ordered   14  LORazepam 2 MG Oral Tablet; TAKE 1 TABLET 3 TIMES DAILY AS NEEDED; Therapy: 14HIF0906 to (Evaluate:08Apg4056)  Requested for: 77SZA4479; Last    Rx:48Ifk8478 Ordered   15  Magnesium 200 MG Oral Tablet; Therapy: 19ZXT5979 to Recorded   16  Metoprolol Tartrate 50 MG Oral Tablet; take 1 tablet by mouth twice daily; Therapy: 20LGV9082 to (Evaluate:09Jun2017)  Requested for: 49WJY1071; Last    Rx:14Jun2016 Ordered   17  OneTouch Ultra Blue In [employer]; TEST TWICE A DAY; Therapy: 82RIA5703 to (Evaluate:93Fhd7044)  Requested for: 46ZSF1745; Last    Rx:10Mar2016 Ordered   18  OneTouch UltraSoft Lancets Miscellaneous; test twice daily; Therapy: 93KIZ8167 to (Danilo Dawson)  Requested for: 94CXN2170; Last    Rx:09Mar2016 Ordered   19  Pantoprazole Sodium 40 MG Oral Tablet Delayed Release; take 1 tablet by mouth every    day; Therapy: 06VBH9899 to (Evaluate:88Hvj6487)  Requested for: 24TBO7366; Last    Rx:75Kqv4351 Ordered   20  Pravastatin Sodium 80 MG Oral Tablet; take 1 tablet by mouth every day; Therapy: 26TAW9865 to (Evaluate:12Ciu0969)  Requested for: 04VWM9033; Last    Rx:22Jan2017 Ordered   21  PredniSONE 5 MG Oral Tablet; TAKE ONE AND 1/2 TABLETS BY MOUTH DAILY; Therapy: 11UOP6794 to ([employer])  Requested for: 57NPK2036; Last    Rx:03Quh8104 Ordered   22  ROPINIRole HCl - 0 25 MG Oral Tablet; take 1 tablet by mouth twice daily; Therapy: 05IBK7707 to (Evaluate:06Lll9414)  Requested for: 55GNC0464; Last    Rx:30Jun2016 Ordered   23  Sertraline HCl - 100 MG Oral Tablet; TAKE 2 TABLETS DAILY; Therapy: 71HOR8001 to (Evaluate:46Bln7855)  Requested for: 96IYP4583; Last    Rx:78Tvo5580 Ordered   24  Sodium Bicarbonate 650 MG Oral Tablet; Take one tablet daily;     Therapy: 74MNG5519 to (Evaluate:12Apr2016)  Requested for: 47KBR3428 Recorded   25  Tacrolimus 1 MG Oral Capsule; Take 4 in the morning and 3 in the evening  Requested    for: 59JKS2603; Last Rx:08Feb2017 Ordered   26  TraZODone HCl - 150 MG Oral Tablet; TAKE 1 TABLET AT BEDTIME; Therapy: 89QPT6284 to (Evaluate:19Ipn6094)  Requested for: 60KWU2126; Last    Rx:02Mar2017 Ordered   27  VESIcare 5 MG Oral Tablet; take one tablet daily; Therapy: 02Blm0493 to Recorded   28  Vitamin D3 1000 UNIT Oral Capsule; TAKE 3 PILLS AT NOON BY MOUTH; Therapy: (Recorded:17Jun2016) to Recorded    Allergies    1  Penicillins   2  Morphine Derivatives   3  Duricef TABS   4   Myrbetriq PA39    Signatures   Electronically signed by : EDGAR Griffin ; Mar 21 2017  9:01AM EST                       (Author)

## 2018-01-10 NOTE — MISCELLANEOUS
Message   Recorded as Task   Date: 04/10/2016 09:45 PM, Created By: Roberto Chandra   Task Name: Miscellaneous   Assigned To: [facility],Team   Regarding Patient: Beatriz Rubio, Status: Active   CommentCharissa Pa - 10 Apr 2016 9:45 PM     TASK CREATED  re: BP log sent in, no changes for now, send in Bp's in 3 weeks   I spoke with the patient and she is aware of the above  kja      Active Problems    1  Abnormal EKG (794 31) (R94 31)   2  Acid reflux disease (530 81) (K21 9)   3  Acute on chronic diastolic congestive heart failure (428 33,428 0) (I50 33)   4  Anemia (285 9) (D64 9)   5  Atrial fibrillation (427 31) (I48 91)   6  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)   7  Benign hypertensive CKD (403 10) (I12 9)   8  Bilateral leg edema (782 3) (R60 0)   9  Bruit of left carotid artery (785 9) (R09 89)   10  Cataract (366 9) (H26 9)   11  Chronic diastolic congestive heart failure (428 32,428 0) (I50 32)   12  Chronic kidney disease, stage 3 (585 3) (N18 3)   13  Cocaine abuse in remission (305 63) (F14 10)   14  Cognitive changes (799 59) (R41 89)   15  Constipation (564 00) (K59 00)   16  Diabetic Gastropathy (537 9)   17  Diabetic Nephropathy (583 81)   18  Diabetic polyneuropathy (250 60,357 2) (E11 42)   19  Diabetic retinopathy (250 50,362 01) (E11 319)   20  Diabetic Ulcer Of The Left Foot (250 80)   21  ROD (dyspnea on exertion) (786 09) (R06 09)   22  Drug-induced Essential Tremor (333 1)   23  Encounter for screening mammogram for breast cancer (V76 12) (Z12 31)   24  Esophagitis, reflux (530 11) (K21 0)   25  External Hemorrhoids (455 3)   26  FELIPE (generalized anxiety disorder) (300 02) (F41 1)   27  H/O kidney transplant (V42 0) (Z94 0)   28  Heart palpitations (785 1) (R00 2)   29  Hyperlipidemia (272 4) (E78 5)   30  Hyperplastic colon polyp (211 3) (K63 5)   31  Hypertension (401 9) (I10)   32  Hypothyroidism (244 9) (E03 9)   33   Increased white blood cell count (288 60) (D72 829)   34  Insomnia (780 52) (G47 00)   35  Irritable bowel syndrome (564 1) (K58 9)   36  Major depressive disorder, recurrent episode, in full remission (296 36) (F33 42)   37  Memory loss (780 93) (R41 3)   38  Metabolic acidosis (677 9) (E87 2)   39  MGUS (monoclonal gammopathy of unknown significance) (273 1) (D47 2)   40  Neck pain (723 1) (M54 2)   41  Nonrheumatic aortic valve insufficiency (424 1) (I35 1)   42  Osteopenia (733 90) (M85 80)   43  Osteoporosis (733 00) (M81 0)   44  Peripheral vascular disease (443 9) (I73 9)   45  Post traumatic stress disorder (309 81) (F43 10)   46  Proteinuria (791 0) (R80 9)   47  Rectal polyp (569 0) (K62 1)   48  Restless legs syndrome (333 94) (G25 81)   49  Secondary hyperparathyroidism, renal (588 81) (N25 81)   50  Sinus Tachycardia (427 89)   51  SOB (shortness of breath) (786 05) (R06 02)   52  Tremor (781 0) (R25 1)   53  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 (597 16) (C60 08)    Current Meds   1  AmLODIPine Besylate 10 MG Oral Tablet; take 1/2 tab  BID; Therapy: 05NVX7440 to (Evaluate:18Mar2017)  Requested for: 29MKH5843; Last   Rx:23Mar2016 Ordered   2  ARIPiprazole 20 MG Oral Tablet; TAKE 1 TABLET AT BEDTIME; Therapy: 58CND9055 to (Evaluate:50Xkr4026)  Requested for: 41GWJ5851; Last   Rx:50Wyy8978 Ordered   3  Aspirin 81 MG Oral Tablet; TAKE 1 TABLET DAILY; Therapy: 74ZNH6853 to (Evaluate:19Jun2016) Recorded   4  Citracal TABS; Take one tablet twice daily Recorded   5  CloNIDine HCl - 0 1 MG Oral Tablet; Take three tablets every morning, two tablets every   noon, and three tablets every evening; Therapy: 40IED0506 to (Evaluate:52Gyw9049)  Requested for: 34ZSG4304; Last   Rx:36Nxl3458 Ordered   6  Doxazosin Mesylate 1 MG Oral Tablet (Cardura); TAKE 1 TABLET Bedtime; Therapy: 87PYS0874 to (Evaluate:16Mar2017)  Requested for: 21Mar2016; Last   Rx:21Mar2016 Ordered   7   DULoxetine HCl - 60 MG Oral Capsule Delayed Release Particles (Cymbalta); TAKE 1   CAPSULE TWICE DAILY; Therapy: 07OVZ6973 to (Evaluate:57Ohc5434)  Requested for: 35YFN3937; Last   Rx:24Yob7777 Ordered   8  Folic Acid 1 MG Oral Tablet; TAKE 1 TABLET DAILY AS DIRECTED; Therapy: 10QWE8329 to (Evaluate:01Oaj5419)  Requested for: 10MSG1403; Last   Rx:09Bsv4415 Ordered   9  Furosemide 40 MG Oral Tablet; Take one tablet every morning and 1/2 tablet every   evening; Therapy: 28UJN4725 to (Fina Mooring)  Requested for: 93GGI0895 Recorded   10  Generlac 10 GM/15ML SYRP Recorded   11  HydrALAZINE HCl - 50 MG Oral Tablet; TAKE 1 TABLET 3 TIMES DAILY; Therapy: 21CCH9532 to (Evaluate:26Jan2017)  Requested for: 92BXJ6849; Last    Rx:17Xgd1566 Ordered   12  Hydrocodone-Acetaminophen 5-325 MG Oral Tablet; TAKE 1 TABLET EVERY 6 HOURS    AS NEEDED FOR PAIN;    Therapy: 90UNR9349 to (Evaluate:07Jan2016); Last Rx:12Mtx0176 Ordered   13  Levothyroxine Sodium 88 MCG Oral Tablet (Synthroid); take 1 tablet every day; Therapy: 59EZP5295 to (Last Rx:02Feb2016)  Requested for: 02Feb2016 Ordered   14  LORazepam 2 MG Oral Tablet; TAKE 1 TABLET TWICE DAILY AS NEEDED; Therapy: 58LOL2039 to (Evaluate:09Aug2016); Last Rx:11Feb2016 Ordered   15  Magnesium  (64 Mg) MG TBCR; TAKE 1 TABLET DAILY; Therapy: 46ZBJ3765 to (Evaluate:79Gpc5684); Last Rx:05Jun2013 Ordered   16  Metoprolol Tartrate 50 MG Oral Tablet; take 1 tablet by mouth twice daily; Therapy: 63LSV9193 to (Evaluate:06Jun2016)  Requested for: 19VPE7607; Last    Rx:08Mar2016 Ordered   17  OneTouch Ultra Blue In [employer]; TEST TWICE A DAY; Therapy: 73RLH9982 to (Evaluate:33Cwm8597)  Requested for: 09BLZ2832; Last    Rx:10Mar2016 Ordered   18  OneTouch UltraSoft Lancets Miscellaneous; test twice daily; Therapy: 56PFG1163 to (Vikas Oakley)  Requested for: 69KMN3328; Last    Rx:09Mar2016 Ordered   19  Pantoprazole Sodium 40 MG Oral Tablet Delayed Release; TAKE 1 TABLET DAILY;     Therapy: 98MBZ7012 to (Evaluate:02Apr2016)  Requested for: 83BCF3154; Last    Rx:78Tjv8181 Ordered   20  Pravastatin Sodium 80 MG Oral Tablet; take 1 tablet by mouth every day; Therapy: 90EWD9046 to (Evaluate:40Lvz2920)  Requested for: 53Rba9675; Last    Rx:58Wxk5619 Ordered   21  PredniSONE 5 MG Oral Tablet; Take 1-1/2 tablets daily; Therapy: 40VET5596 to (Evaluate:14Mar2016)  Requested for: 20Mar2015; Last    Rx:20Mar2015 Ordered   22  ROPINIRole HCl - 0 25 MG Oral Tablet; take 1 tablet by mouth twice daily; Therapy: 21OCC9588 to (Evaluate:87Tud6687)  Requested for: 46NGJ1097; Last    Rx:22Mar2016 Ordered   23  Sertraline HCl - 100 MG Oral Tablet; TAKE 2 TABLETS DAILY; Therapy: 28ETA9942 to (Evaluate:62Jcn7362)  Requested for: 39GKB1505; Last    Rx:16Uxt1142 Ordered   24  Sodium Bicarbonate 650 MG Oral Tablet; Take one tablet daily; Therapy: 52KJT9647 to (Evaluate:12Apr2016)  Requested for: 94FPY6115 Recorded   25  Tacrolimus 1 MG Oral Capsule; TAKE 4 CAPSULES every morning and 3 capsules every    evening; Therapy: 84OEY1348 to (Evaluate:86Cbk5519)  Requested for: 17IGZ8040; Last    Rx:04Mar2016 Ordered   26  TraZODone HCl - 150 MG Oral Tablet; TAKE 1 TABLET AT BEDTIME; Therapy: 62PGD1600 to (Evaluate:15Evs7580)  Requested for: 28NSS9093; Last    Rx:96Syp2172 Ordered   27  Vitamin D3 3000 UNIT Oral Tablet; 1 TAB DAILY; Therapy: 71ERX0004 to (Evaluate:66Gbw7220) Recorded    Allergies    1  Penicillins   2  Morphine Derivatives   3   33 [facility] TABS    Signatures   Electronically signed by : EDGAR Carias ; Apr 11 2016  4:19PM EST

## 2018-01-10 NOTE — PROGRESS NOTES
Patient Health Assessment    Date:            10/13/2017  Blood Pressure:  142/68  Pulse:           70  Age:             52  Weight:          215 lbs  Height/Length:   5' 7"  Body Mass Index: 33 7  Provider:        PATRICIA_IKE  Clinic:          [facility] 39: Diabetes    Heart Condition    Heart Murmur    High Blood Pressure    Kidney Disease    Latex Allergy    *Pre-Med    Organ Transplant    anemia    Psychiatric problems  Medications: LACTULOSE 10 GM/15 ML SOLUTION    Trazodone HCl    Clonidine    Sodium bicarbonate    SERTRALINE  MG TABLET    Lorazepam    ASPIR EC 81 MG TABLET 81 MG    Lasix 40 MG Oral Tablet    OTHER  Allergies:      Penicillin    Latex    Morphine  Since Last Visit: Medical Alert: No Change    Medications: No Change    Allergies:        No Change  Pain Scale Type: Numeric Pain ScalePain Level: 0  Description:  Pt presents for #7 and #8 class 5 restorations   PMH review, no changes  Applied topical benzocaine, administered 1 carps 2% lido 1:100k epi via  buccal infiltration   Prep with 245 carbide on high speed  Caries removed  with round carbide on slow speed  Isolation with retreaction cord and  cotton  rolls and dri-angles  Etch with 37% H2PO4, rinse, dry  Applied Vividbond with  15 second scrubx2, gentle air dry and light cured  Restored with Beautifil  flowable and Alpha II composite shade A3 and light cured  Refined with  finishing burs, polished with enhance point  Verified occlusion and contacts  Pt left satisfied and ambulatory      NV: #27-29 bridge    /LIDIA    ----- Signed on Friday, October 13, 2017 at 9:49:36 AM  -----  ----- Provider: [email or web address] - [facility], Dentist -- Clinic: [facility] -----

## 2018-01-10 NOTE — MISCELLANEOUS
Message   Recorded as Task   Date: 01/22/2016 12:25 PM, Created By: Roberto Chandra   Task Name: Miscellaneous   Assigned To: [facility],Team   Regarding Patient: Beatriz Rubio, Status: Active   Comment:    Sundeep Boyle - 22 Jan 2016 12:25 PM     TASK CREATED  re: BP log   increase hydralazine to 50mg q 8 h and send in BP's in 3 weeks   I spoke with the patient and she is aware of the above  kja      Active Problems    1  Abnormal EKG (794 31) (R94 31)   2  Acute on chronic diastolic congestive heart failure (428 33,428 0) (I50 33)   3  Anemia (285 9) (D64 9)   4  Atrial fibrillation (427 31) (I48 91)   5  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)   6  Benign hypertensive CKD (403 10) (I12 9)   7  Bilateral leg edema (782 3) (R60 0)   8  Cataract (366 9) (H26 9)   9  Chronic kidney disease, stage 3 (585 3) (N18 3)   10  Cocaine abuse in remission (305 63) (F14 10)   11  Cognitive changes (799 59) (R41 89)   12  Constipation (564 00) (K59 00)   13  Diabetic Gastropathy (537 9)   14  Diabetic Nephropathy (583 81)   15  Diabetic polyneuropathy (250 60,357 2) (E11 42)   16  Diabetic retinopathy (250 50,362 01) (E11 319)   17  Diabetic Ulcer Of The Left Foot (250 80)   18  ROD (dyspnea on exertion) (786 09) (R06 09)   19  Drug-induced Essential Tremor (333 1)   20  Encounter for screening mammogram for breast cancer (V76 12) (Z12 31)   21  Esophageal reflux (530 81) (K21 9)   22  Esophagitis, reflux (530 11) (K21 0)   23  External Hemorrhoids (455 3)   24  FELIPE (generalized anxiety disorder) (300 02) (F41 1)   25  H/O kidney transplant (V42 0) (Z94 0)   26  Heart palpitations (785 1) (R00 2)   27  Hyperlipidemia (272 4) (E78 5)   28  Hyperplastic colon polyp (211 3) (K63 5)   29  Hypertension (401 9) (I10)   30  Hypothyroidism (244 9) (E03 9)   31  Increased white blood cell count (288 60) (D72 829)   32  Irritable bowel syndrome (564 1) (K58 9)   33   Major depressive disorder, recurrent episode, 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) (F33 41)   34  Memory loss (780 93) (R41 3)   35  Metabolic acidosis (026 8) (E87 2)   36  MGUS (monoclonal gammopathy of unknown significance) (273 1) (D47 2)   37  Neck pain (723 1) (M54 2)   38  Nonrheumatic aortic valve insufficiency (424 1) (I35 1)   39  Osteopenia (733 90) (M85 80)   40  Osteoporosis (733 00) (M81 0)   41  Peripheral vascular disease (443 9) (I73 9)   42  Post traumatic stress disorder (309 81) (F43 10)   43  Proteinuria (791 0) (R80 9)   44  Rectal polyp (569 0) (K62 1)   45  Restless legs syndrome (333 94) (G25 81)   46  Secondary hyperparathyroidism, renal (588 81) (N25 81)   47  Sinus Tachycardia (427 89)   48  SOB (shortness of breath) (786 05) (R06 02)   49  Tremor (781 0) (R25 1)   50  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 (035 39) (N14 64)    Current Meds   1  Abilify 20 MG Oral Tablet (ARIPiprazole); TAKE 1 TABLET AT BEDTIME; Therapy: 82IHS6231 to (Jagruti Jerez)  Requested for: 29Ekk4755; Last   Rx:72Kbd7790 Ordered   2  AmLODIPine Besylate 10 MG Oral Tablet; take 1/2 tab  BID; Therapy: 36LDH9761 to (Evaluate:75Hkv9706)  Requested for: 88Bae3770 Recorded   3  Aspirin 81 MG Oral Tablet; TAKE 1 TABLET DAILY; Therapy: 40KNP6351 to (Evaluate:19Jun2016) Recorded   4  Carafate 1 GM/10ML Oral Suspension; Therapy: ([id number] 670620) to Recorded   5  Citracal TABS; Take one tablet twice daily Recorded   6  Clindamycin HCl - 300 MG Oral Capsule; TAKE 2 CAPSULES 1 HOUR PRIOR TO   DENTAL APPOINTMENT; Therapy: 30Apr2015 to (Evaluate:85Ttj7361)  Requested for: 33WPD0263; Last   PU:99SJI1945 Ordered   7  CloNIDine HCl - 0 1 MG Oral Tablet; Take three tablets every morning, two tablets every   noon, and three tablets every evening; Therapy: 27XWO6106 to (Evaluate:14Mar2016)  Requested for: 20Mar2015; Last   Rx:20Mar2015 Ordered   8   DULoxetine HCl - 60 MG Oral Capsule Delayed Release Particles (Cymbalta); TAKE 1   CAPSULE TWICE DAILY; Therapy: 15NJI9393 to (Tirso Agrawal)  Requested for: 19LSD9017; Last   Rx:84Dur9511; Status: ACTIVE - Renewal Denied Ordered   9  Folic Acid 1 MG Oral Tablet; TAKE 1 TABLET DAILY AS DIRECTED; Therapy: 00INC9059 to (Evaluate:06Fjr0044)  Requested for: 89TLO8557; Last   Rx:22Pei3735 Ordered   10  Furosemide 40 MG Oral Tablet; Take 1 tablet twice daily; Therapy: 61UFI2036 to (Evaluate:11Jun2016)  Requested for: 03IKH0336 Recorded   11  Generlac 10 GM/15ML SYRP Recorded   12  HydrALAZINE HCl - 50 MG Oral Tablet; TAKE 1 TABLET 3 TIMES DAILY; Therapy: 46ETN0729 to (Evaluate:16Jan2017)  Requested for: 34RMG1301 Recorded   13  Hydrocodone-Acetaminophen 5-325 MG Oral Tablet; TAKE 1 TABLET EVERY 6 HOURS    AS NEEDED FOR PAIN;    Therapy: 21WXU6790 to (Evaluate:07Jan2016); Last Rx:53Lve7595 Ordered   14  Levothyroxine Sodium 75 MCG Oral Tablet; take 1 tablet by mouth every day; Therapy: 12TEG8081 to (Evaluate:94Jas0871)  Requested for: 37JXW6004; Last    Rx:19Njq4104 Ordered   15  LORazepam 2 MG Oral Tablet; TAKE 1 TABLET TWICE DAILY AS NEEDED; Therapy: 13CHC0063 to (Evaluate:19Jun2016) Recorded   16  Magnesium  (64 Mg) MG TBCR; TAKE 1 TABLET DAILY; Therapy: 18CEG2647 to (Evaluate:77Tku0171); Last Rx:05Jun2013 Ordered   17  Metoprolol Tartrate 50 MG Oral Tablet; TAKE 2 TABLETS DAILY; Therapy: 13BTW6003 to (Evaluate:75Blz6971)  Requested for: 96Gfk5654 Recorded   18  OneTouch Ultra Blue In Vitro Strip; TEST EVERY DAY AND AS NEEDED; Therapy: 35ZQD9451 to (Evaluate:19Apr2016)  Requested for: 66Wyc8144; Last    Rx:52Vqr3411 Ordered   19  Pantoprazole Sodium 40 MG Oral Tablet Delayed Release; TAKE 1 TABLET DAILY; Therapy: 08VUK8521 to (Evaluate:02Apr2016)  Requested for: 64XHP1706; Last    Rx:05Oct2015 Ordered   20  Pravastatin Sodium 80 MG Oral Tablet; take 1 tablet by mouth every day; Therapy: 93LZN8337 to (Evaluate:15Nov2016)  Requested for: 66Ldb7456;  Last    Rx:19Apr2015 Ordered   21  PredniSONE 5 MG Oral Tablet; Take 1-1/2 tablets daily; Therapy: 96VQA0495 to (Evaluate:14Mar2016)  Requested for: 20Mar2015; Last    Rx:20Mar2015 Ordered   22  ROPINIRole HCl - 0 25 MG Oral Tablet; take 1 tablet by mouth twice a day; Therapy: 50JYW2043 to (Mara Guzmanck)  Requested for: 14Wda4098; Last    Rx:44Nji3367 Ordered   23  Sertraline HCl - 100 MG Oral Tablet; TAKE 2 TABLETS DAILY; Therapy: 09KGH0787 to (Veronicaone Ambrosio)  Requested for: 69KHX7196; Last    Rx:49Pob2340; Status: ACTIVE - Renewal Denied Ordered   24  Sodium Bicarbonate 650 MG Oral Tablet; TAKE ONE TABLET BY MOUTH 3 TIMES    DAILY; Therapy: 66CUM2122 to (Evaluate:12Apr2016)  Requested for: 20WDM5681 Recorded   25  Tacrolimus 1 MG Oral Capsule; TAKE 4 CAPSULES every morning and 3 capsules every    evening; Therapy: 90GNT6792 [facility])  Requested for: 62MBO2678; Last    Rx:15Oct2015 Ordered   26  TraZODone HCl - 150 MG Oral Tablet; TAKE 1 TABLET AT BEDTIME; Therapy: 47RIR9359 to ([facility])  Requested for: 63ABZ0343; Last    Rx:87Xqj0097; Status: ACTIVE - Renewal Denied Ordered   27  Vitamin D3 3000 UNIT Oral Tablet; 1 TAB DAILY; Therapy: 04MYY8037 to (Evaluate:10Jnb2787) Recorded    Allergies    1  Penicillins   2  Morphine Derivatives   3   33 [facility] TABS    Signatures   Electronically signed by : EDGAR Ruiz ; Jan 23 2016  1:37PM EST

## 2018-01-10 NOTE — MISCELLANEOUS
Message   Recorded as Task   Date: 04/11/2016 03:50 PM, Created By: Stuart Mcginnis   Task Name: Medical Complaint Callback   Assigned To: Cheryl Blackmon   Regarding Patient: Maximilian Ferreira, Status: Active   Comment:    Cheryl Blackmon - 11 Apr 2016 3:50 PM     TASK CREATED  Caller: Self; Medical Complaint; [phone] (Home); [phone] (Work)  Patient called and states that she had an iron infusion on wednesday of last week, and she states that she does not feel any better  She is extremely tired and cold all the time  She states that it is difficult for her to even get out of bed  Sundeep Boyle - 11 Apr 2016 4:30 PM     TASK EDITED  should see her PCP   labs didn't look bad  Does she have any fevers? Patient states that she does not have any fevers  She will call her PCP  maulik      Active Problems    1  Abnormal EKG (794 31) (R94 31)   2  Acid reflux disease (530 81) (K21 9)   3  Acute on chronic diastolic congestive heart failure (428 33,428 0) (I50 33)   4  Anemia (285 9) (D64 9)   5  Atrial fibrillation (427 31) (I48 91)   6  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)   7  Benign hypertensive CKD (403 10) (I12 9)   8  Bilateral leg edema (782 3) (R60 0)   9  Bruit of left carotid artery (785 9) (R09 89)   10  Cataract (366 9) (H26 9)   11  Chronic diastolic congestive heart failure (428 32,428 0) (I50 32)   12  Chronic kidney disease, stage 3 (585 3) (N18 3)   13  Cocaine abuse in remission (305 63) (F14 10)   14  Cognitive changes (799 59) (R41 89)   15  Constipation (564 00) (K59 00)   16  Diabetic Gastropathy (537 9)   17  Diabetic Nephropathy (583 81)   18  Diabetic polyneuropathy (250 60,357 2) (E11 42)   19  Diabetic retinopathy (250 50,362 01) (E11 319)   20  Diabetic Ulcer Of The Left Foot (250 80)   21  ROD (dyspnea on exertion) (786 09) (R06 09)   22  Drug-induced Essential Tremor (333 1)   23  Encounter for screening mammogram for breast cancer (V76 12) (Z12 31)   24   Esophagitis, reflux (530 11) (K21 0)   25  External Hemorrhoids (455 3)   26  FELIPE (generalized anxiety disorder) (300 02) (F41 1)   27  H/O kidney transplant (V42 0) (Z94 0)   28  Heart palpitations (785 1) (R00 2)   29  Hyperlipidemia (272 4) (E78 5)   30  Hyperplastic colon polyp (211 3) (K63 5)   31  Hypertension (401 9) (I10)   32  Hypothyroidism (244 9) (E03 9)   33  Increased white blood cell count (288 60) (D72 829)   34  Insomnia (780 52) (G47 00)   35  Irritable bowel syndrome (564 1) (K58 9)   36  Major depressive disorder, recurrent episode, in full remission (296 36) (F33 42)   37  Memory loss (780 93) (R41 3)   38  Metabolic acidosis (702 0) (E87 2)   39  MGUS (monoclonal gammopathy of unknown significance) (273 1) (D47 2)   40  Neck pain (723 1) (M54 2)   41  Nonrheumatic aortic valve insufficiency (424 1) (I35 1)   42  Osteopenia (733 90) (M85 80)   43  Osteoporosis (733 00) (M81 0)   44  Peripheral vascular disease (443 9) (I73 9)   45  Post traumatic stress disorder (309 81) (F43 10)   46  Proteinuria (791 0) (R80 9)   47  Rectal polyp (569 0) (K62 1)   48  Restless legs syndrome (333 94) (G25 81)   49  Secondary hyperparathyroidism, renal (588 81) (N25 81)   50  Sinus Tachycardia (427 89)   51  SOB (shortness of breath) (786 05) (R06 02)   52  Tremor (781 0) (R25 1)   53  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 (630 32) (W16 16)    Current Meds   1  AmLODIPine Besylate 10 MG Oral Tablet; take 1/2 tab  BID; Therapy: 64MPI5341 to (Evaluate:18Mar2017)  Requested for: 70FZP9235; Last   Rx:23Mar2016 Ordered   2  ARIPiprazole 20 MG Oral Tablet; TAKE 1 TABLET AT BEDTIME; Therapy: 50SKR8200 to (Evaluate:91Fvq6157)  Requested for: 04LNQ7255; Last   Rx:21Tcz8724 Ordered   3  Aspirin 81 MG Oral Tablet; TAKE 1 TABLET DAILY; Therapy: 60DZG9639 to (Evaluate:19Jun2016) Recorded   4  Citracal TABS; Take one tablet twice daily Recorded   5  CloNIDine HCl - 0 1 MG Oral Tablet;  Take three tablets every morning, two tablets every   noon, and three tablets every evening; Therapy: 66ZVM9885 to (Evaluate:10Feb2017)  Requested for: 15PNB4655; Last   Rx:16Feb2016 Ordered   6  Doxazosin Mesylate 1 MG Oral Tablet (Cardura); TAKE 1 TABLET Bedtime; Therapy: 84ROJ4226 to (Evaluate:16Mar2017)  Requested for: 21Mar2016; Last   Rx:21Mar2016 Ordered   7  DULoxetine HCl - 60 MG Oral Capsule Delayed Release Particles (Cymbalta); TAKE 1   CAPSULE TWICE DAILY; Therapy: 44ZWV3903 to (Evaluate:09Aug2016)  Requested for: 78SEF9324; Last   Rx:11Feb2016 Ordered   8  Folic Acid 1 MG Oral Tablet; TAKE 1 TABLET DAILY AS DIRECTED; Therapy: 46ZCZ5517 to (Evaluate:14Sep2016)  Requested for: 02ORU1309; Last   Rx:20Sep2015 Ordered   9  Furosemide 40 MG Oral Tablet; Take one tablet every morning and 1/2 tablet every   evening; Therapy: 58SFT8220 to (Deandre Lemons)  Requested for: 42TNM3871 Recorded   10  Generlac 10 GM/15ML SYRP Recorded   11  HydrALAZINE HCl - 50 MG Oral Tablet; TAKE 1 TABLET 3 TIMES DAILY; Therapy: 22KRQ8636 to (Evaluate:26Jan2017)  Requested for: 47LCU5734; Last    Rx:01Feb2016 Ordered   12  Hydrocodone-Acetaminophen 5-325 MG Oral Tablet; TAKE 1 TABLET EVERY 6 HOURS    AS NEEDED FOR PAIN;    Therapy: 73BOJ4642 to (Evaluate:07Jan2016); Last Rx:31Ymn7468 Ordered   13  Levothyroxine Sodium 88 MCG Oral Tablet (Synthroid); take 1 tablet every day; Therapy: 21BTK2037 to (Last Rx:02Feb2016)  Requested for: 02Feb2016 Ordered   14  LORazepam 2 MG Oral Tablet; TAKE 1 TABLET TWICE DAILY AS NEEDED; Therapy: 05YEL8539 to (Evaluate:09Aug2016); Last Rx:11Feb2016 Ordered   15  Magnesium  (64 Mg) MG TBCR; TAKE 1 TABLET DAILY; Therapy: 78AJK6597 to (Evaluate:02Dec2013); Last Rx:05Jun2013 Ordered   16  Metoprolol Tartrate 50 MG Oral Tablet; take 1 tablet by mouth twice daily; Therapy: 55AKO4116 to (Evaluate:06Jun2016)  Requested for: 69SLB7402; Last    Rx:08Mar2016 Ordered   17   OneTouch Ultra Blue In Vitro Strip; TEST TWICE A DAY; Therapy: 30TGF1951 to (Evaluate:76Jdd5663)  Requested for: 86HTD3306; Last    Rx:10Mar2016 Ordered   18  OneTouch UltraSoft Lancets Miscellaneous; test twice daily; Therapy: 94FCE1937 to (Figueroa Licona)  Requested for: 34NDI3756; Last    Rx:09Mar2016 Ordered   19  Pantoprazole Sodium 40 MG Oral Tablet Delayed Release; TAKE 1 TABLET DAILY; Therapy: 38OTP9586 to (Evaluate:02Apr2016)  Requested for: 54IKW0169; Last    Rx:28Vfi2902 Ordered   20  Pravastatin Sodium 80 MG Oral Tablet; take 1 tablet by mouth every day; Therapy: 98RMY1987 to (Evaluate:37Yda6236)  Requested for: 64Fmj6059; Last    Rx:19Apr2015 Ordered   21  PredniSONE 5 MG Oral Tablet; Take 1-1/2 tablets daily; Therapy: 69PSC8602 to (Evaluate:14Mar2016)  Requested for: 20Mar2015; Last    Rx:20Mar2015 Ordered   22  ROPINIRole HCl - 0 25 MG Oral Tablet; take 1 tablet by mouth twice daily; Therapy: 98VTX8948 to (Evaluate:30Ttu3169)  Requested for: 30VID8377; Last    Rx:22Mar2016 Ordered   23  Sertraline HCl - 100 MG Oral Tablet; TAKE 2 TABLETS DAILY; Therapy: 13JKO1848 to (Evaluate:81Oco9917)  Requested for: 50XYJ4206; Last    Rx:59Wte9030 Ordered   24  Sodium Bicarbonate 650 MG Oral Tablet; Take one tablet daily; Therapy: 95UST5196 to (Evaluate:69Obi0734)  Requested for: 01JGY2875 Recorded   25  Tacrolimus 1 MG Oral Capsule; TAKE 4 CAPSULES every morning and 3 capsules every    evening; Therapy: 37XHP7474 to (Evaluate:14Yva6502)  Requested for: 97FBF5147; Last    Rx:04Mar2016 Ordered   26  TraZODone HCl - 150 MG Oral Tablet; TAKE 1 TABLET AT BEDTIME; Therapy: 75KTV3754 to (Evaluate:91Xrj6109)  Requested for: 84VTW5512; Last    Rx:99Sqp2963 Ordered   27  Vitamin D3 3000 UNIT Oral Tablet; 1 TAB DAILY; Therapy: 42UNS0874 to (Evaluate:52Vzd1603) Recorded    Allergies    1  Penicillins   2  Morphine Derivatives   3   [place]    Signatures   Electronically signed by : EDGAR Edouard ; Apr 11 2016 9:09PM EST

## 2018-01-10 NOTE — PROGRESS NOTES
Patient Health Assessment    Date:            09/09/2016  Blood Pressure:  149/67  Pulse:           55  Age:             51  Weight:          217 lbs  Height/Length:   5' 7"  Body Mass Index: 34 0  Provider:        [email or web address]  Clinic:          [facility]: Diabetes    Heart Condition    Heart Murmur    High Blood Pressure    Kidney Disease    Latex Allergy    Latex Allergy    Organ Transplant    anemia  Medications: Allergies:      Penicillin  Since Last Visit: Medical Alert: No Change    Medications: No Change    Allergies:        No Change  Pain Scale Type: Numeric Pain ScalePain Level: 0  Description:    Pt presents for comprehensive exam  Completed oral cancer screening, no  abnormal findings  Obtained PMH and reviewed findings  Completed restorative  charting  Patient had FMX taken at outside provider and father (pt does not  have insurance, father is paying cash for treatment) did not want to pay for  another one  Informed pt that we can not tx plan without radiographs  Pt will  bring FMX at/prior to [place] when we will finalize tx plan  Pt understands, left  ambulatory and satisifed      Plan for next visit:  - Perio screening  - Review FMX  - Tx plan  - Send med clearance to PCP  - Enter meds/med problems from H&P    [place]: tx plan (pt needs to bring [facility])    Indra/LIDIA    ----- Signed on Friday, September 09, 2016 at 3:39:07 PM  -----  ----- Provider: Siena Wilkes - Resident Two, Dentist -- Clinic: [facility] -----

## 2018-01-10 NOTE — MISCELLANEOUS
Message   Recorded as Task   Date: 01/02/2017 12:26 PM, Created By: Julissa Boyd   Task Name: Miscellaneous   Assigned To: Ban Mcginnis   Regarding Patient: Torres Hernandez, Status: In Progress   Tala Matos - 02 Jan 2017 12:26 PM     TASK CREATED  Please let Soledad Heimlich note that based on her blood pressure log, there will be no change in her antihypertensives   Ana Maria Stark - 03 Jan 2017 8:55 AM     TASK IN PROGRESS   I did inform Shimon Owen that based on her most recent BP log, there will be changes to her antihypertensives at this time  [facility] 01/03/2017      Active Problems    1  Abnormal EKG (794 31) (R94 31)   2  Abscess of buttock, right (682 5) (L02 31)   3  Acid reflux disease (530 81) (K21 9)   4  Allergic urticaria (708 0) (L50 0)   5  Anemia (285 9) (D64 9)   6  Antibiotic prophylaxis for dental procedure indicated due to prior joint replacement   (V58 62) (Z79 2)   7  Atrial fibrillation (427 31) (I48 91)   8  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)   9  Benign hypertensive CKD (403 10) (I12 9)   10  Bilateral leg edema (782 3) (R60 0)   11  Bruit of left carotid artery (785 9) (R09 89)   12  Cataract (366 9) (H26 9)   13  Chronic diastolic congestive heart failure (428 32,428 0) (I50 32)   14  Chronic kidney disease, stage 4 (severe) (585 4) (N18 4)   15  Cocaine abuse in remission (305 63) (F14 10)   16  Cognitive changes (799 59) (R41 89)   17  Constipation (564 00) (K59 00)   18  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, without long-term    current use of insulin (250 61) (E10 49)   19  Diabetes mellitus with nephropathy (250 40,583 81) (E11 21)   20  Diabetic Gastropathy (537 9)   21  Diabetic polyneuropathy (250 60,357 2) (E11 42)   22  Diabetic retinopathy (250 50,362 01) (E11 319)   23  Diabetic Ulcer Of The Left Foot (250 80)   24  Diastolic dysfunction (135 4) (I51 9)   25  ROD (dyspnea on exertion) (786 09) (R06 09)   26   Drug-induced Essential Tremor (333 1)   27  Dysuria (788 1) (R30 0)   28  Encephalopathy (348 30) (G93 40)   29  Encounter for screening mammogram for breast cancer (V76 12) (Z12 31)   30  Esophagitis, reflux (530 11) (K21 0)   31  External Hemorrhoids (455 3)   32  FELIPE (generalized anxiety disorder) (300 02) (F41 1)   33  H/O kidney transplant (V42 0) (Z94 0)   34  Heart palpitations (785 1) (R00 2)   35  Hospital discharge follow-up (V67 59) (Z09)   36  Hyperlipidemia (272 4) (E78 5)   37  Hyperplastic colon polyp (211 3) (K63 5)   38  Hypertension (401 9) (I10)   39  Hypothyroidism (244 9) (E03 9)   40  Increased frequency of urination (788 41) (R35 0)   41  Increased white blood cell count (288 60) (D72 829)   42  Insomnia (780 52) (G47 00)   43  Irritable bowel syndrome (564 1) (K58 9)   44  Major depressive disorder, recurrent, moderate (296 32) (F33 1)   45  Memory loss (780 93) (R41 3)   46  Memory loss or impairment (780 93) (R41 3)   47  Metabolic acidosis (255 8) (E87 2)   48  MGUS (monoclonal gammopathy of unknown significance) (273 1) (D47 2)   49  Neck pain (723 1) (M54 2)   50  Need for influenza vaccination (V04 81) (Z23)   51  Nonrheumatic aortic valve insufficiency (424 1) (I35 1)   52  OAB (overactive bladder) (596 51) (N32 81)   53  Osteopenia (733 90) (M85 80)   54  Osteoporosis (733 00) (M81 0)   55  Other calculus in bladder (594 1) (N21 0)   56  Peripheral vascular disease (443 9) (I73 9)   57  Post traumatic stress disorder (309 81) (F43 10)   58  Preop general physical exam (V72 83) (Z01 818)   59  Preoperative cardiovascular examination (V72 81) (Z01 810)   60  Proteinuria (791 0) (R80 9)   61  Rectal polyp (569 0) (K62 1)   62  Recurrent UTI (599 0) (N39 0)   63  Restless legs syndrome (333 94) (G25 81)   64  Secondary hyperparathyroidism, renal (588 81) (N25 81)   65  Sinus Tachycardia (427 89)   66  SOB (shortness of breath) (786 05) (R06 02)   67  Tremor (781 0) (R25 1)   68   Unsteady gait (781 2) (R26 81)    Current Meds   1  AmLODIPine Besylate 10 MG Oral Tablet; take 1/2 tab  BID; Therapy: 80KJU5147 to (Evaluate:30Apr2017)  Requested for: 03OUU6974 Recorded   2  ARIPiprazole 30 MG Oral Tablet; TAKE 1 TABLET AT BEDTIME; Therapy: 48XSB0418 to (Evaluate:15May2017)  Requested for: 45SJC6520; Last   Rx:16Nov2016 Ordered   3  Aspirin 81 MG TABS; TAKE 1 TABLET DAILY; Therapy: 86SXM9789 to (Evaluate:19Jun2016) Recorded   4  Caltrate 600 TABS; TAKE 1 TABLET TWICE DAILY; Therapy: (Recorded:17Jun2016) to Recorded   5  Catapres 0 1 MG Oral Tablet (CloNIDine HCl); take 3 tab  in am, 2 tab at noon, 3 tab  at   night; Therapy: 84UPK8529 to  Requested for: 88KMN4350 Recorded   6  Clindamycin HCl - 300 MG Oral Capsule; TAKE 2 CAPSULES 1 HOUR PRIOR TO   DENTAL APPOINTMENT; Therapy: 69PKM4615 to (Evaluate:21Oct2016)  Requested for: 19DGI0804; Last   Rx:20Oct2016 Ordered   7  Doxazosin Mesylate 1 MG Oral Tablet; TAKE 1 TABLET AT BEDTIME; Therapy: (Recorded:17Jun2016) to Recorded   8  DULoxetine HCl - 60 MG Oral Capsule Delayed Release Particles (Cymbalta); TAKE 1   CAPSULE TWICE DAILY; Therapy: 44VVA3332 to (Evaluate:07Feb2017)  Requested for: 45JZV9870; Last   Rx:09Nov2016 Ordered   9  Folic Acid 1 MG Oral Tablet; TAKE 1 TABLET DAILY AS DIRECTED; Therapy: 20UUD4150 to (Evaluate:07Oct2017)  Requested for: 68YVC6416; Last   Rx:12Oct2016 Ordered   10  Furosemide 40 MG Oral Tablet; TAKE 1 TO 2 TABLETS DAILY AS NEEDED FOR    EDEMA; Therapy: 04RZF5901 to (Evaluate:25Jun2017)  Requested for: 94ZNC4944; Last    Rx:30Jun2016 Ordered   11  HydrALAZINE HCl - 50 MG Oral Tablet; TAKE 1 TABLET 3 TIMES DAILY; Therapy: 55FCS6893 to (Evaluate:26Jan2017)  Requested for: 72QJC6423; Last    Rx:01Feb2016 Ordered   12  Lactulose 10 GM/15ML Oral Solution; TAKE 30 ML DAILY; Therapy: (Recorded:43Doc7293) to Recorded   13  Levothyroxine Sodium 88 MCG Oral Tablet; take 1 tablet by mouth daily;     Therapy: 07JAV6236 to (Evaluate:15Mar2017)  Requested for: 73EZC3368; Last    Rx:27Esi8226 Ordered   14  LORazepam 2 MG Oral Tablet; TAKE 1 TABLET 3 TIMES DAILY AS NEEDED; Therapy: 14CJA5435 to (Evaluate:15May2017)  Requested for: 26WPR8570; Last    Rx:16Nov2016 Ordered   15  Magnesium 200 MG Oral Tablet; Therapy: 13NKN3498 to Recorded   16  Magnesium TABS; Therapy: (Recorded:07Oct2016) to Recorded   17  Metoprolol Tartrate 50 MG Oral Tablet; take 1 tablet by mouth twice daily; Therapy: 50UEZ3019 to (Evaluate:09Jun2017)  Requested for: 90MXG5465; Last    Rx:14Jun2016 Ordered   18  OneTouch Ultra Blue In [employer]; TEST TWICE A DAY; Therapy: 21XOI4927 to (Evaluate:56Syv8788)  Requested for: 05HHZ9176; Last    Rx:10Mar2016 Ordered   19  OneTouch UltraSoft Lancets Miscellaneous; test twice daily; Therapy: 66CCH4733 to (Cleopatra Areola)  Requested for: 42IEQ2205; Last    Rx:09Mar2016 Ordered   20  Pantoprazole Sodium 40 MG Oral Tablet Delayed Release; take 1 tablet by mouth every    day; Therapy: 10NBS5275 to (Evaluate:63Fgp4137)  Requested for: 03DQH8131; Last    Rx:25Zkg5944 Ordered   21  Pravastatin Sodium 80 MG Oral Tablet; take 1 tablet by mouth every day; Therapy: 75XVW2639 to ([facility])  Requested for: 89URC9813; Last    OQ:24XMN3418 Ordered   22  PredniSONE 5 MG Oral Tablet; TAKE ONE AND 1/2 TABLETS BY MOUTH DAILY; Therapy: 44UMB8633 to ([facility])  Requested for: 53YLK8772; Last    Rx:30Lfa8382 Ordered   23  ROPINIRole HCl - 0 25 MG Oral Tablet; take 1 tablet by mouth twice daily; Therapy: 09LCG5578 to (Evaluate:09Jfx3557)  Requested for: 65YWS1162; Last    Rx:30Jun2016 Ordered   24  Sertraline HCl - 100 MG Oral Tablet; TAKE 2 TABLETS DAILY; Therapy: 00DPA3574 to (Evaluate:06Esy4524)  Requested for: 85KOD1466; Last    Rx:83Ffz5759 Ordered   25  Sodium Bicarbonate 650 MG Oral Tablet; Take one tablet daily;     Therapy: 27YTX7307 to (Evaluate:12Apr2016)  Requested for: 26IYE1112 Recorded   26  Tacrolimus 1 MG Oral Capsule (Prograf); Take 4 in the morning and 3 in the evening     Requested for: 20Nov2016; Last Rx:18Nov2016 Ordered   27  TraZODone HCl - 150 MG Oral Tablet; TAKE 1 TABLET AT BEDTIME; Therapy: 02RMN9121 to (Evaluate:48Jaw6131)  Requested for: 23EQY6464; Last    Rx:09Nov2016 Ordered   28  Vitamin D3 1000 UNIT Oral Capsule; TAKE 3 PILLS AT NOON BY MOUTH; Therapy: (Recorded:17Jun2016) to Recorded    Allergies    1  Penicillins   2  Morphine Derivatives   3  Duricef TABS   4   Myrbetriq KF64    Signatures   Electronically signed by : Manus Snellen, M D ; Jan 4 2017 12:48PM EST

## 2018-01-10 NOTE — MISCELLANEOUS
Message   Recorded as Task   Date: 07/25/2017 10:13 AM, Created By: Phylicia Skinner   Task Name: Follow Up   Assigned To: Colby Hashimoto   Regarding Patient: Coty Loera, Status: In Progress   Comment:    Humza Gusman - 25 Jul 2017 10:13 AM     TASK CREATED  hello    labs from last week reviewed  tac is ok, but on lower end  Cr stable  UPCR slightly higher  When you talk to patient about urinary symptoms    would you also remind patient to have labs next week for monthly labwork  BMP, CBC, Tac, UPCR, UA    Thank you    Ana Maria Serrano - 25 Jul 2017 11:07 AM     TASK IN PROGRESS   I spoke with MJ in regards to the above task  I did make her aware that all labs were reviewed and that tac level was OK but on the lower end as well as Cr being stable  I did also mention that UPCR was slightly elevated as well  Pt denied having dysuria however did c/o frequent urination  Monthly txplnt labs were done this morning at [facility] lab  Those results should be resulted by tomorrow afternoon  [facility] 7/25/2017    thank you  np      Active Problems    1  Abnormal EKG (794 31) (R94 31)   2  Acid reflux disease (530 81) (K21 9)   3  Acute kidney injury superimposed on CKD (866 00,585 9) (N17 9,N18 9)   4  Anemia (285 9) (D64 9)   5  Antibiotic prophylaxis for dental procedure indicated due to prior joint replacement   (V58 62) (Z79 2)   6  Atrial fibrillation (427 31) (I48 91)   7  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)   8  Benign hypertensive CKD (403 10) (I12 9)   9  Bilateral carotid bruits (785 9) (R09 89)   10  Bilateral leg edema (782 3) (R60 0)   11  Bruit of left carotid artery (785 9) (R09 89)   12  Cataract (366 9) (H26 9)   13  Chronic constipation (564 00) (K59 09)   14  Chronic diastolic congestive heart failure (428 32,428 0) (I50 32)   15  Chronic kidney disease, stage 4 (severe) (585 4) (N18 4)   16  Cocaine abuse in remission (305 63) (F14 10)   17  Cognitive changes (799 59) (R41 89)   18  Colon cancer screening (V76 51) (Z12 11)   19  Constipation (564 00) (K59 00)   20  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, without long-term    current use of insulin (250 61) (E10 49)   21  Diabetes mellitus with diabetic nephropathy (250 40,583 81) (E11 21)   22  Diabetic Gastropathy (537 9)   23  Diabetic polyneuropathy (250 60,357 2) (E11 42)   24  Diabetic retinopathy (250 50,362 01) (E11 319)   25  Diabetic Ulcer Of The Left Foot (250 80)   26  Diastolic dysfunction (346 3) (I51 9)   27  ROD (dyspnea on exertion) (786 09) (R06 09)   28  Drug-induced Essential Tremor (333 1)   29  Encounter for screening mammogram for breast cancer (V76 12) (Z12 31)   30  Esophagitis, reflux (530 11) (K21 0)   31  External Hemorrhoids (455 3)   32  Fatigue (780 79) (R53 83)   33  FELIPE (generalized anxiety disorder) (300 02) (F41 1)   34  H/O kidney transplant (V42 0) (Z94 0)   35  Heart palpitations (785 1) (R00 2)   36  Hospital discharge follow-up (V67 59) (Z09)   40  Hyperlipidemia (272 4) (E78 5)   38  Hyperplastic colon polyp (211 3) (K63 5)   39  Hypertension (401 9) (I10)   40  Hyponatremia (276 1) (E87 1)   41  Hypothyroidism (244 9) (E03 9)   42  Increased frequency of urination (788 41) (R35 0)   43  Increased white blood cell count (288 60) (D72 829)   44  Insomnia (780 52) (G47 00)   45  Irritable bowel syndrome (564 1) (K58 9)   46  Major depressive disorder, recurrent episode, in full remission (296 36) (F33 42)   47  Memory loss (780 93) (R41 3)   48  Memory loss or impairment (780 93) (R41 3)   49  Metabolic acidosis (619 7) (E87 2)   50  MGUS (monoclonal gammopathy of unknown significance) (273 1) (D47 2)   51  Need for influenza vaccination (V04 81) (Z23)   52  Nonrheumatic aortic valve insufficiency (424 1) (I35 1)   53  OAB (overactive bladder) (596 51) (N32 81)   54  Osteopenia (733 90) (M85 80)   55  Osteoporosis (733 00) (M81 0)   56   Other calculus in bladder (594 1) (N21 0) 57  Peripheral vascular disease (443 9) (I73 9)   58  Post traumatic stress disorder (309 81) (F43 10)   59  Preop general physical exam (V72 83) (Z01 818)   60  Preoperative cardiovascular examination (V72 81) (Z01 810)   61  Proteinuria (791 0) (R80 9)   62  Pyelonephritis (590 80) (N12)   63  Rectal polyp (569 0) (K62 1)   64  Recurrent UTI (599 0) (N39 0)   65  Restless legs syndrome (333 94) (G25 81)   66  Secondary hyperparathyroidism, renal (588 81) (N25 81)   67  Snoring (786 09) (R06 83)   68  SOB (shortness of breath) (786 05) (R06 02)   69  Tremor (781 0) (R25 1)   70  Unsteady gait (781 2) (R26 81)   71  Weakness (780 79) (R53 1)    Current Meds   1  AmLODIPine Besylate 10 MG Oral Tablet; take 1/2 tab  BID; Therapy: 72UIH2765 to (Last Rx:07Apr2017)  Requested for: 31CJD2832 Ordered   2  ARIPiprazole 30 MG Oral Tablet; Take 1 tablet by mouth at bedtime; Therapy: 17IQV6691 to (Evaluate:75Drz4688)  Requested for: 18BXN7173; Last   Rx:34Tpb6532 Ordered   3  Aspirin 81 MG TABS; TAKE 1 TABLET DAILY; Therapy: 98QGO1207 to (Evaluate:19Jun2016) Recorded   4  Catapres 0 1 MG Oral Tablet; take 3 tab  in am, 2 tab at noon, 3 tab  at night; Therapy: 15HTK1454 to (Last Rx:22Mar2017)  Requested for: 23Mar2017 Ordered   5  Clindamycin HCl - 300 MG Oral Capsule; TAKE 2 CAPSULES 1 HOUR PRIOR TO   DENTAL APPOINTMENT; Therapy: 21LDX9171 to ([facility])  Requested for: 13Apr2017; Last   Rx:07Apr2017 Ordered   6  Doxazosin Mesylate 1 MG Oral Tablet; TAKE 1 TABLET Bedtime  Requested for:   47XQP5743; Last Rx:05Apr2017 Ordered   7  DULoxetine HCl - 60 MG Oral Capsule Delayed Release Particles; TAKE 1 CAPSULE   TWICE DAILY; Therapy: 67MUA8669 to (Evaluate:10Umk2427)  Requested for: 21XJH0748; Last   Rx:02Mar2017 Ordered   8  Folic Acid 1 MG Oral Tablet; TAKE 1 TABLET DAILY AS DIRECTED; Therapy: 86ZCY2933 to (Evaluate:07Oct2017)  Requested for: 94UKT5214; Last   Rx:12Oct2016 Ordered   9   HydrALAZINE HCl - 50 MG Oral Tablet; TAKE 1 TABLET 3 TIMES DAILY; Therapy: 63NOA8936 to (Karole Pu)  Requested for: 91SXD4376; Last   Rx:13Mar2017 Ordered   10  Lactulose 10 GM/15ML Oral Solution; TAKE 30 ML DAILY; Therapy: 04SDC1283 to (Last Rx:02Jun2017)  Requested for: 02Jun2017 Ordered   11  Levothyroxine Sodium 88 MCG Oral Tablet; take 1 tablet by mouth daily; Therapy: 85LHT0158 to (Evaluate:15Mar2017)  Requested for: 31BAO3982; Last    Rx:00Xet0061 Ordered   12  LORazepam 2 MG Oral Tablet; TAKE 1 TABLET 3 TIMES DAILY AS NEEDED; Therapy: 26HMN8051 to (Evaluate:85Rul9176)  Requested for: 92ZXO4863; Last    Rx:16Nov2016 Ordered   13  Magnesium 200 MG Oral Tablet; Therapy: 21PCZ5399 to Recorded   14  Metoprolol Tartrate 50 MG Oral Tablet; take 1 tablet by mouth twice daily; Therapy: 27JJG2848 to (Evaluate:59Ppj2061)  Requested for: 27Jun2017; Last    HD:87JLN0371 Ordered   15  OneTouch Ultra Blue In [employer]; test twice daily; Therapy: 99DEV9061 to (Evaluate:24Mar2018)  Requested for: 46OJE6879; Last    Rx:29Mar2017 Ordered   16  OneTouch UltraSoft Lancets Miscellaneous; test twice daily; Therapy: 96JHR4120 to (Rajatole Pu)  Requested for: 39HVB3496; Last    Rx:09Mar2016 Ordered   17  Pravastatin Sodium 80 MG Oral Tablet; take 1 tablet by mouth every day; Therapy: 22GBV3569 to (Evaluate:06Gpy1530)  Requested for: 52PNG0254; Last    Rx:22Jan2017 Ordered   18  PredniSONE 5 MG Oral Tablet; TAKE 1 1/2 TABLETS BY MOUTH EVERY DAY; Therapy: 01TEV6249 to (Evaluate:81Tsa5664)  Requested for: 89NQS5203; Last    Rx:06Jun2017 Ordered   19  ROPINIRole HCl - 0 25 MG Oral Tablet; take 1 tablet by mouth twice daily; Therapy: 48DZN3323 to (Evaluate:61Mgm3872)  Requested for: 98ASJ3580; Last    Rx:30Jun2016 Ordered   20  Sertraline HCl - 100 MG Oral Tablet; TAKE 2 TABLETS DAILY; Therapy: 85TWZ3761 to (Evaluate:66Ffs2046)  Requested for: 91VCF7874; Last    Rx:16Nov2016 Ordered   21   Sodium Bicarbonate 650 MG Oral Tablet; Take one tablet daily; Therapy: 47UMV3661 to (Evaluate:12Apr2016)  Requested for: 78JHJ5285 Recorded   22  Tacrolimus 1 MG Oral Capsule; Take 4 in the morning and 3 in the evening  Requested    for: 78JYT6465; Last Rx:05Jue9245 Ordered   23  TraZODone HCl - 150 MG Oral Tablet; TAKE 1 TABLET AT BEDTIME; Therapy: 02YAO4803 to (Evaluate:15Xgo2659)  Requested for: 37BEW7010; Last    Rx:02Mar2017 Ordered   24  VESIcare 5 MG Oral Tablet; take one tablet daily; Therapy: 87Rob6679 to Recorded   25  Vitamin D3 1000 UNIT Oral Capsule; TAKE 3 PILLS AT NOON BY MOUTH; Therapy: (Recorded:17Jun2016) to Recorded    Allergies    1  Penicillins   2  Morphine Derivatives   3  Duricef TABS   4   Myrbetriq KU46    Signatures   Electronically signed by : EDGAR Atkins ; Jul 31 2017  8:58PM EST                       (Author)

## 2018-01-10 NOTE — PROGRESS NOTES
History of Present Illness  Care Coordination Encounter Information:   Type of Encounter: Telephonic   Contact: Initial Contact    Spoke to Patient   Denied any needs at this time  Tired since hospitalization  Instructed to call if fatigue does not improve  She said each day it's a bit better  Nephrology f/u 7/6/17  Active Problems    1  Abnormal EKG (794 31) (R94 31)   2  Acid reflux disease (530 81) (K21 9)   3  Acute kidney injury superimposed on CKD (866 00,585 9) (N17 9,N18 9)   4  Anemia (285 9) (D64 9)   5  Antibiotic prophylaxis for dental procedure indicated due to prior joint replacement   (V58 62) (Z79 2)   6  Atrial fibrillation (427 31) (I48 91)   7  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)   8  Benign hypertensive CKD (403 10) (I12 9)   9  Bilateral carotid bruits (785 9) (R09 89)   10  Bilateral leg edema (782 3) (R60 0)   11  Bruit of left carotid artery (785 9) (R09 89)   12  Cataract (366 9) (H26 9)   13  Chronic constipation (564 00) (K59 09)   14  Chronic diastolic congestive heart failure (428 32,428 0) (I50 32)   15  Chronic kidney disease, stage 4 (severe) (585 4) (N18 4)   16  Cocaine abuse in remission (305 63) (F14 10)   17  Cognitive changes (799 59) (R41 89)   18  Colon cancer screening (V76 51) (Z12 11)   19  Constipation (564 00) (K59 00)   20  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, without long-term    current use of insulin (250 61) (E10 49)   21  Diabetes mellitus with diabetic nephropathy (250 40,583 81) (E11 21)   22  Diabetic Gastropathy (537 9)   23  Diabetic polyneuropathy (250 60,357 2) (E11 42)   24  Diabetic retinopathy (250 50,362 01) (E11 319)   25  Diabetic Ulcer Of The Left Foot (250 80)   26  Diastolic dysfunction (462 9) (I51 9)   27  ROD (dyspnea on exertion) (786 09) (R06 09)   28  Drug-induced Essential Tremor (333 1)   29  Encounter for screening mammogram for breast cancer (V76 12) (Z12 31)   30   Esophagitis, reflux (530 11) (K21 0) 31  External Hemorrhoids (455 3)   32  Fatigue (780 79) (R53 83)   33  FELIPE (generalized anxiety disorder) (300 02) (F41 1)   34  H/O kidney transplant (V42 0) (Z94 0)   35  Heart palpitations (785 1) (R00 2)   36  Hospital discharge follow-up (V67 59) (Z09)   40  Hyperlipidemia (272 4) (E78 5)   38  Hyperplastic colon polyp (211 3) (K63 5)   39  Hypertension (401 9) (I10)   40  Hyponatremia (276 1) (E87 1)   41  Hypothyroidism (244 9) (E03 9)   42  Increased frequency of urination (788 41) (R35 0)   43  Increased white blood cell count (288 60) (D72 829)   44  Insomnia (780 52) (G47 00)   45  Irritable bowel syndrome (564 1) (K58 9)   46  Major depressive disorder, recurrent episode, in full remission (296 36) (F33 42)   47  Memory loss (780 93) (R41 3)   48  Memory loss or impairment (780 93) (R41 3)   49  Metabolic acidosis (633 5) (E87 2)   50  MGUS (monoclonal gammopathy of unknown significance) (273 1) (D47 2)   51  Need for influenza vaccination (V04 81) (Z23)   52  Nonrheumatic aortic valve insufficiency (424 1) (I35 1)   53  OAB (overactive bladder) (596 51) (N32 81)   54  Osteopenia (733 90) (M85 80)   55  Osteoporosis (733 00) (M81 0)   56  Other calculus in bladder (594 1) (N21 0)   57  Peripheral vascular disease (443 9) (I73 9)   58  Post traumatic stress disorder (309 81) (F43 10)   59  Preop general physical exam (V72 83) (Z01 818)   60  Preoperative cardiovascular examination (V72 81) (Z01 810)   61  Proteinuria (791 0) (R80 9)   62  Pyelonephritis (590 80) (N12)   63  Rectal polyp (569 0) (K62 1)   64  Recurrent UTI (599 0) (N39 0)   65  Restless legs syndrome (333 94) (G25 81)   66  Secondary hyperparathyroidism, renal (588 81) (N25 81)   67  Snoring (786 09) (R06 83)   68  SOB (shortness of breath) (786 05) (R06 02)   69  Tremor (781 0) (R25 1)   70  Unsteady gait (781 2) (R26 81)   71  Weakness (780 79) (R53 1)    Past Medical History    1  History of Abnormal Liver Function Test (790 6)   2  History of Abnormal urine (791 9) (R82 90)   3  History of Abscess of buttock, right (682 5) (L02 31)   4  History of Acute kidney injury (584 9) (N17 9)   5  History of Acute on chronic diastolic congestive heart failure (428 33,428 0) (I50 33)   6  History of Bilateral impacted cerumen (380 4) (H61 23)   7  History of Cervical Dysplasia (V13 22)   8  History of Denial Of Any Significant Medical History   9  History of Diabetes mellitus with foot ulcer (250 80,707 15) (E11 621,L97 509)   10  History of allergic urticaria (V13 3) (Z87 2)   11  History of cholelithiasis (V12 79) (Z87 19)   12  History of dysuria (V13 00) (Z87 898)   13  History of encephalopathy (V12 49) (Z86 69)   14  History of gastritis (V12 79) (Z87 19)   15  Denied: History of head injury   16  History of hematuria (V13 09) (Z87 448)   17  History of hyperkalemia (V12 29) (Z86 39)   18  History of pneumonia (V12 61) (Z87 01)   19  History of seborrhea (V13 3) (Z87 2)   20  History of urinary tract infection (V13 02) (Z87 440)   21  History of urinary tract infection (V13 02) (Z87 440)   22  History of Iliotibial band syndrome (728 89) (M76 30)   23  History of Infected tooth (522 4) (K04 7)   24  History of Lumbar radiculopathy (724 4) (M54 16)   25  History of Neck pain (723 1) (M54 2)   26  History of Nonrheumatic aortic valve insufficiency (424 1) (I35 1)   27  Denied: History of Seizures   28  History of Sinus Tachycardia (427 89)   29  History of Trochanteric bursitis, unspecified laterality (726 5) (M70 60)   30  History of Urinary Tract Infection (V13 02)   31  History of UTI (urinary tract infection), bacterial (599 0,041 9) (N39 0,A49 9)   32  History of Vaginal itching (698 1) (L29 8)    Surgical History    1  History of Cataract Surgery   2  History of Cholecystectomy   3  History of Complete Colonoscopy   4  History of Complete Colonoscopy   5  History of Diagnostic Esophagogastroduodenoscopy   6   History of Diagnostic Esophagogastroduodenoscopy   7  History of Dilation And Curettage   8  History of Foot Surgery   9  History of Pancreatic Transplantation   10  Renal Transplant   11  History of Surgery Left Foot Amputation    Family History  Mother    1  No family history of mental disorder  Father    2  Family history of cancer (V16 9) (Z80 9)   3  No family history of mental disorder  Sister    4  Family history of depression (V17 0) (Z81 8)  Grandparent    5  Family history of cancer (V16 9) (Z80 9)  Maternal Grandmother    6  Family history of Breast Cancer (V16 3)    Social History    · Denied: History of Alcohol Use (History)   · Former smoker (V15 82) (L19 102)   · History of Uses cocaine    Current Meds    1  Folic Acid 1 MG Oral Tablet; TAKE 1 TABLET DAILY AS DIRECTED; Therapy: 91MOT0690 to (Evaluate:07Oct2017)  Requested for: 71BCY3522; Last   Rx:12Oct2016 Ordered    2  HydrALAZINE HCl - 50 MG Oral Tablet; TAKE 1 TABLET 3 TIMES DAILY; Therapy: 97TDJ5870 to (Manoj Padilla)  Requested for: 04QID0491; Last   Rx:13Mar2017 Ordered    3  AmLODIPine Besylate 10 MG Oral Tablet; take 1/2 tab  BID; Therapy: 32OFE3182 to (Last Rx:07Apr2017)  Requested for: 63ZYI8105 Ordered   4  Catapres 0 1 MG Oral Tablet (CloNIDine HCl); take 3 tab  in am, 2 tab at noon, 3 tab  at   night; Therapy: 47MZP4882 to (Last Rx:22Mar2017)  Requested for: 23Mar2017 Ordered   5  Doxazosin Mesylate 1 MG Oral Tablet; TAKE 1 TABLET Bedtime  Requested for:   91WTD5886; Last Rx:05Apr2017 Ordered   6  Metoprolol Tartrate 50 MG Oral Tablet; take 1 tablet by mouth twice daily; Therapy: 89ZKR8122 to (Evaluate:21Jun2018)  Requested for: 26Jun2017; Last   Rx:26Jun2017; Status: ACTIVE - Retrospective By Protocol Authorization Ordered    7  Sodium Bicarbonate 650 MG Oral Tablet; Take one tablet daily; Therapy: 67LDI4321 to (Evaluate:12Apr2016)  Requested for: 86DKE5981 Recorded    8   Generlac 10 GM/15ML Oral Solution; TAKE 30 ML BY MOUTH daily; Therapy: 92SUW3581 to (Mary Lou Wong)  Requested for: 60TVF8868; Last   JV:84ISC6419 Ordered   9  Lactulose 10 GM/15ML Oral Solution; TAKE 30 ML DAILY; Therapy: 82DWA2008 to (Last Rx:02Jun2017)  Requested for: 02Jun2017 Ordered    10  Furosemide 20 MG Oral Tablet; take 1/2 tab  daily; Therapy: 84Akp7574 to Recorded   11  Magnesium 200 MG Oral Tablet; Therapy: 11JBO7084 to Recorded    12  Clindamycin HCl - 300 MG Oral Capsule; TAKE 2 CAPSULES 1 HOUR PRIOR TO    DENTAL APPOINTMENT; Therapy: 12VSG9499 to (Jaclyn Malone)  Requested for: 78Ixv0466; Last    Rx:07Apr2017 Ordered    13  OneTouch Ultra Blue In [employer]; test twice daily; Therapy: 78BSC1411 to (Evaluate:24Mar2018)  Requested for: 18DPH0179; Last    Rx:29Mar2017 Ordered    14  OneTouch UltraSoft Lancets Miscellaneous; test twice daily; Therapy: 26JJX3378 to (Sandra Baxter)  Requested for: 78OWE5990; Last    Rx:09Mar2016 Ordered    15  LORazepam 2 MG Oral Tablet; TAKE 1 TABLET 3 TIMES DAILY AS NEEDED; Therapy: 59TIB6208 to (Evaluate:15May2017)  Requested for: 89HZS9867; Last    Rx:16Nov2016 Ordered    16  DULoxetine HCl - 60 MG Oral Capsule Delayed Release Particles (Cymbalta); TAKE 1    CAPSULE TWICE DAILY; Therapy: 80KDF4472 to (Evaluate:84Qjn0897)  Requested for: 46SHS2265; Last    Rx:02Mar2017 Ordered    17  Sertraline HCl - 100 MG Oral Tablet; TAKE 2 TABLETS DAILY; Therapy: 67PYO4006 to (Evaluate:15May2017)  Requested for: 83OEA8265; Last    Rx:16Nov2016 Ordered    18  PredniSONE 5 MG Oral Tablet; TAKE 1 1/2 TABLETS BY MOUTH EVERY DAY; Therapy: 22OJB3332 to (Evaluate:76Irq7184)  Requested for: 37MCP7858; Last    Rx:06Jun2017 Ordered   19  Tacrolimus 1 MG Oral Capsule (Prograf); Take 4 in the morning and 4 in the evening     Requested for: 26Jun2017; Last Rx:26Jun2017; Status: ACTIVE - Retrospective By    Protocol Authorization Ordered    20  Aspirin 81 MG TABS; TAKE 1 TABLET DAILY;     Therapy: 06EMR9654 to (Evaluate:19Jun2016) Recorded    21  Pravastatin Sodium 80 MG Oral Tablet; take 1 tablet by mouth every day; Therapy: 76GTI4047 to (Evaluate:70Eah5840)  Requested for: 37HWV8001; Last    Rx:22Jan2017 Ordered    22  Levothyroxine Sodium 88 MCG Oral Tablet; take 1 tablet by mouth daily; Therapy: 43GUW8928 to (Evaluate:15Mar2017)  Requested for: 38DAB6397; Last    Rx:35Mns0537 Ordered    23  TraZODone HCl - 150 MG Oral Tablet; TAKE 1 TABLET AT BEDTIME; Therapy: 20HMY1435 to (Evaluate:32Zts0573)  Requested for: 02WAN9652; Last    Rx:02Mar2017 Ordered    24  ARIPiprazole 30 MG Oral Tablet; Take 1 tablet by mouth at bedtime; Therapy: 85UDV1497 to (Evaluate:83Emq9246)  Requested for: 21UCK6941; Last    Rx:86Ysb2126 Ordered    25  ROPINIRole HCl - 0 25 MG Oral Tablet; take 1 tablet by mouth twice daily; Therapy: 59ELF9200 to (Evaluate:54Urz9381)  Requested for: 50XLM1199; Last    Rx:30Jun2016 Ordered    26  Flonase Allergy Relief 50 MCG/ACT Nasal Suspension (Fluticasone Propionate); USE 2    SPRAYS IN EACH NOSTRIL ONCE DAILY; Therapy: 71JZF6401 to Recorded   27  VESIcare 5 MG Oral Tablet; take one tablet daily; Therapy: 47Wcp6542 to Recorded   28  Vitamin D3 1000 UNIT Oral Capsule; TAKE 3 PILLS AT NOON BY MOUTH; Therapy: (Recorded:17Jun2016) to Recorded    Allergies    1  Penicillins   2  Morphine Derivatives   3  Duricef TABS   4  Myrbetriq TB24    End of Encounter Meds    1  Folic Acid 1 MG Oral Tablet; TAKE 1 TABLET DAILY AS DIRECTED; Therapy: 07PSA7761 to (Evaluate:07Oct2017)  Requested for: 20ICY5506; Last   Rx:12Oct2016 Ordered    2  HydrALAZINE HCl - 50 MG Oral Tablet; TAKE 1 TABLET 3 TIMES DAILY; Therapy: 17QXS8887 to (Jessica Maldonado)  Requested for: 38UWX4328; Last   Rx:13Mar2017 Ordered    3  AmLODIPine Besylate 10 MG Oral Tablet; take 1/2 tab  BID; Therapy: 17WTW1381 to (Last Rx:07Apr2017)  Requested for: 02NIY4048 Ordered   4   Catapres 0 1 MG Oral Tablet (CloNIDine HCl); take 3 tab  in am, 2 tab at noon, 3 tab  at   night; Therapy: 03NDQ0612 to (Last Rx:22Mar2017)  Requested for: 23Mar2017 Ordered   5  Doxazosin Mesylate 1 MG Oral Tablet; TAKE 1 TABLET Bedtime  Requested for:   48NJD1438; Last Rx:05Apr2017 Ordered   6  Metoprolol Tartrate 50 MG Oral Tablet; take 1 tablet by mouth twice daily; Therapy: 93PUX6118 to (Evaluate:21Jun2018)  Requested for: 26Jun2017; Last   Rx:26Jun2017; Status: ACTIVE - Retrospective By Protocol Authorization Ordered    7  Sodium Bicarbonate 650 MG Oral Tablet; Take one tablet daily; Therapy: 94DNZ3080 to (Evaluate:12Apr2016)  Requested for: 32LZV6072 Recorded    8  Generlac 10 GM/15ML Oral Solution; TAKE 30 ML BY MOUTH daily; Therapy: 62IUS9211 to (Glenys Capps)  Requested for: 06CQH0040; Last   TE:98HLM1870 Ordered   9  Lactulose 10 GM/15ML Oral Solution; TAKE 30 ML DAILY; Therapy: 19QYN2694 to (Last Rx:02Jun2017)  Requested for: 02Jun2017 Ordered    10  Furosemide 20 MG Oral Tablet; take 1/2 tab  daily; Therapy: 47Knt7238 to Recorded   11  Magnesium 200 MG Oral Tablet; Therapy: 24YOT4533 to Recorded    12  Clindamycin HCl - 300 MG Oral Capsule; TAKE 2 CAPSULES 1 HOUR PRIOR TO    DENTAL APPOINTMENT; Therapy: 12OCC2200 to [phone])  Requested for: 13Apr2017; Last    Rx:07Apr2017 Ordered    13  OneTouch Ultra Blue In [employer]; test twice daily; Therapy: 55ISH1051 to (Evaluate:24Mar2018)  Requested for: 13POW2917; Last    Rx:29Mar2017 Ordered    14  OneTouch UltraSoft Lancets Miscellaneous; test twice daily; Therapy: 44OZZ7373 to (Star Navarro)  Requested for: 50QAZ4127; Last    Rx:09Mar2016 Ordered    15  LORazepam 2 MG Oral Tablet; TAKE 1 TABLET 3 TIMES DAILY AS NEEDED; Therapy: 77VRM8730 to (Evaluate:15May2017)  Requested for: 40QBW9641; Last    Rx:16Nov2016 Ordered    16  DULoxetine HCl - 60 MG Oral Capsule Delayed Release Particles (Cymbalta); TAKE 1    CAPSULE TWICE DAILY;     Therapy: 84YQM4783 to (Evaluate:12Ulz6095)  Requested for: 02ZWF6936; Last    Rx:02Mar2017 Ordered    17  Sertraline HCl - 100 MG Oral Tablet; TAKE 2 TABLETS DAILY; Therapy: 50CPP0747 to (Evaluate:15May2017)  Requested for: 57ZQC1391; Last    Rx:16Nov2016 Ordered    18  PredniSONE 5 MG Oral Tablet; TAKE 1 1/2 TABLETS BY MOUTH EVERY DAY; Therapy: 42BYM5232 to (Evaluate:04Sep2017)  Requested for: 31IXF1131; Last    Rx:06Jun2017 Ordered   19  Tacrolimus 1 MG Oral Capsule (Prograf); Take 4 in the morning and 4 in the evening     Requested for: 26Jun2017; Last Rx:26Jun2017; Status: ACTIVE - Retrospective By    Protocol Authorization Ordered    20  Aspirin 81 MG TABS; TAKE 1 TABLET DAILY; Therapy: 42BCQ7156 to (Evaluate:19Jun2016) Recorded    21  Pravastatin Sodium 80 MG Oral Tablet; take 1 tablet by mouth every day; Therapy: 95EDY0072 to (Evaluate:19Sep2017)  Requested for: 02CZT6887; Last    Rx:22Jan2017 Ordered    22  Levothyroxine Sodium 88 MCG Oral Tablet; take 1 tablet by mouth daily; Therapy: 79OSQ9963 to (Evaluate:15Mar2017)  Requested for: 23JLX0750; Last    Rx:15Nov2016 Ordered    23  TraZODone HCl - 150 MG Oral Tablet; TAKE 1 TABLET AT BEDTIME; Therapy: 22MYS3313 to (Evaluate:07Zod0871)  Requested for: 39PKX2348; Last    Rx:02Mar2017 Ordered    24  ARIPiprazole 30 MG Oral Tablet; Take 1 tablet by mouth at bedtime; Therapy: 43ETD2650 to (Evaluate:08Awf0245)  Requested for: 07HLC7649; Last    Rx:23May2017 Ordered    25  ROPINIRole HCl - 0 25 MG Oral Tablet; take 1 tablet by mouth twice daily; Therapy: 51OMY4314 to (Evaluate:73Hgz1610)  Requested for: 68YDZ1491; Last    Rx:30Jun2016 Ordered    26  Flonase Allergy Relief 50 MCG/ACT Nasal Suspension (Fluticasone Propionate); USE 2    SPRAYS IN EACH NOSTRIL ONCE DAILY; Therapy: 13UXC5000 to Recorded   27  VESIcare 5 MG Oral Tablet; take one tablet daily; Therapy: 77Kxu9827 to Recorded   28   Vitamin D3 1000 UNIT Oral Capsule; TAKE 3 PILLS AT NOON BY MOUTH; Therapy: (Recorded:07Bql3282) to Recorded    Future Appointments    Date/Time Provider Specialty Site   10/06/2017 09:30 AM EDGAR Curran  [address]   08/21/2017 02:00 PM EDGAR Victor  [facility]   07/06/2017 01:30 PM EDGAR Kemp  Nephrology [facility]     Patient Care Team    Care Team Member Role Specialty Office Number   Dashawn BHATT    Nephrology [phone]   Eduar Phoenix MD  Dermatology [phone]   Karen Manley DPM   [phone]   [facility] [employer] [facility]  Urology [phone]   Jessica Roberts MD  Psychiatry [phone] [address]   Nephrology [phone]     Signatures   Electronically signed by : Migel Angela RN; Jun 26 2017  3:51PM EST                       (Author)

## 2018-01-11 NOTE — PROGRESS NOTES
History of Present Illness  Care Coordination Encounter Information:   Type of Encounter: Telephonic    Spoke to  5/17/16 left message for patient to call back  Active Problems    1  Abnormal EKG (794 31) (R94 31)   2  Acid reflux disease (530 81) (K21 9)   3  Acute on chronic diastolic congestive heart failure (428 33,428 0) (I50 33)   4  Anemia (285 9) (D64 9)   5  Atrial fibrillation (427 31) (I48 91)   6  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)   7  Benign hypertensive CKD (403 10) (I12 9)   8  Bilateral leg edema (782 3) (R60 0)   9  Bruit of left carotid artery (785 9) (R09 89)   10  Cataract (366 9) (H26 9)   11  Chronic diastolic congestive heart failure (428 32,428 0) (I50 32)   12  Chronic kidney disease, stage 3 (585 3) (N18 3)   13  Cocaine abuse in remission (305 63) (F14 10)   14  Cognitive changes (799 59) (R41 89)   15  Constipation (564 00) (K59 00)   16  Depression with anxiety (300 4) (F41 8)   17  Diabetes mellitus with nephropathy (250 40,583 81) (E11 21)   18  Diabetic Gastropathy (537 9)   19  Diabetic polyneuropathy (250 60,357 2) (E11 42)   20  Diabetic retinopathy (250 50,362 01) (E11 319)   21  Diabetic Ulcer Of The Left Foot (250 80)   22  ROD (dyspnea on exertion) (786 09) (R06 09)   23  Drug-induced Essential Tremor (333 1)   24  Dysuria (788 1) (R30 0)   25  Encephalopathy (348 30) (G93 40)   26  Encounter for screening mammogram for breast cancer (V76 12) (Z12 31)   27  Esophagitis, reflux (530 11) (K21 0)   28  External Hemorrhoids (455 3)   29  FELIPE (generalized anxiety disorder) (300 02) (F41 1)   30  H/O kidney transplant (V42 0) (Z94 0)   31  Heart palpitations (785 1) (R00 2)   32  Hospital discharge follow-up (V67 59) (Z09)   33  Hyperlipidemia (272 4) (E78 5)   34  Hyperplastic colon polyp (211 3) (K63 5)   35  Hypertension (401 9) (I10)   36  Hypothyroidism (244 9) (E03 9)   37  Increased white blood cell count (288 60) (D72 829)   38  Insomnia (780 52) (G47 00)   39  Irritable bowel syndrome (564 1) (K58 9)   40  Major depressive disorder, recurrent episode, in full remission (296 36) (F33 42)   41  Memory loss (780 93) (R41 3)   42  Metabolic acidosis (322 5) (E87 2)   43  MGUS (monoclonal gammopathy of unknown significance) (273 1) (D47 2)   44  Neck pain (723 1) (M54 2)   45  Nonrheumatic aortic valve insufficiency (424 1) (I35 1)   46  Osteopenia (733 90) (M85 80)   47  Osteoporosis (733 00) (M81 0)   48  Peripheral vascular disease (443 9) (I73 9)   49  Post traumatic stress disorder (309 81) (F43 10)   50  Proteinuria (791 0) (R80 9)   51  Rectal polyp (569 0) (K62 1)   52  Restless legs syndrome (333 94) (G25 81)   53  Secondary hyperparathyroidism, renal (588 81) (N25 81)   54  Sinus Tachycardia (427 89)   55  SOB (shortness of breath) (786 05) (R06 02)   56  Tremor (781 0) (R25 1)   57  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 (037 09) (N90 27)   58  Unsteady gait (781 2) (R26 81)   59  Urinary tract infection (599 0) (N39 0)   60  UTI (urinary tract infection), bacterial (599 0,041 9) (N39 0,A49 9)   61  Vaginal itching (698 1) (L29 8)    Past Medical History    1  History of Abnormal Liver Function Test (790 6)   2  History of Abnormal urine (791 9) (R82 90)   3  History of Cervical Dysplasia (V13 22)   4  History of Denial Of Any Significant Medical History   5  History of Diabetes mellitus with foot ulcer (250 80,707 15) (E11 621,L97 509)   6  History of cholelithiasis (V12 79) (Z87 19)   7  History of gastritis (V12 79) (Z87 19)   8  Denied: History of head injury   9  History of hematuria (V13 09) (Z87 448)   10  History of hyperkalemia (V12 29) (Z86 39)   11  History of pneumonia (V12 61) (Z87 01)   12  History of seborrhea (V13 3) (Z87 2)   13  History of Hyponatremia (276 1) (E87 1)   14  History of Iliotibial band syndrome (728 89) (M76 30)   15  History of Infected tooth (522 4) (K04 7)   16   History of Lumbar radiculopathy (724 4) (M54 16)   17  Denied: History of Seizures   18  History of Trochanteric bursitis, unspecified laterality (726 5) (M70 60)   19  History of Urinary Tract Infection (V13 02)    Surgical History    1  History of Cataract Surgery   2  History of Cholecystectomy   3  History of Complete Colonoscopy   4  History of Complete Colonoscopy   5  History of Diagnostic Esophagogastroduodenoscopy   6  History of Diagnostic Esophagogastroduodenoscopy   7  History of Dilation And Curettage   8  History of Foot Surgery   9  History of Pancreatic Transplantation   10  Renal Transplant   11  History of Surgery Left Foot Amputation    Family History  Mother    1  No family history of mental disorder  Father    2  Family history of cancer (V16 9) (Z80 9)   3  No family history of mental disorder  Sister    4  Family history of depression (V17 0) (Z81 8)  Grandparent    5  Family history of cancer (V16 9) (Z80 9)  Maternal Grandmother    6  Family history of Breast Cancer (V16 3)    Social History    · Denied: History of Alcohol Use (History)   · Former smoker (V15 82) (Y55 797)   · History of Uses cocaine    Current Meds    1  Folic Acid 1 MG Oral Tablet; TAKE 1 TABLET DAILY AS DIRECTED; Therapy: 37XTD2565 to (Evaluate:82Dls8887)  Requested for: 52YDW4270; Last   Rx:21Nmr1257 Ordered    2  HydrALAZINE HCl - 50 MG Oral Tablet; TAKE 1 TABLET 3 TIMES DAILY; Therapy: 34RWA6904 to (Evaluate:26Jan2017)  Requested for: 56XSH0915; Last   Rx:94Zqd2236 Ordered    3  Pantoprazole Sodium 40 MG Oral Tablet Delayed Release; TAKE 1 TABLET DAILY; Therapy: 24BJU2366 to ([facility])  Requested for: 90XVA9427; Last   YY:88YGV2840 Ordered    4  Metoprolol Tartrate 50 MG Oral Tablet; take 1 tablet by mouth twice daily; Therapy: 09UVH7330 to (Evaluate:06Jun2016)  Requested for: 78YLO6499; Last   Rx:08Mar2016 Ordered    5  Catapres 0 3 MG Oral Tablet (CloNIDine HCl); TAKE 0 3MG BY MOUTH 2 TIMES A DAY     INDICATIONS: 0 3MG IN AM, 0 2MG AT [facility] 0 3MG IN PM;   Therapy: 79QWA8087 to (Evaluate:30Apr2017)  Requested for: 01JMV5151 Recorded   6  Sodium Bicarbonate 650 MG Oral Tablet; Take one tablet daily; Therapy: 97NID3807 to (Evaluate:12Apr2016)  Requested for: 85GSK5125 Recorded    7  AmLODIPine Besylate 5 MG Oral Tablet; TAKE 1 TABLET TWICE DAILY; Therapy: 39ZKH5261 to (Evaluate:30Apr2017)  Requested for: 59TWB5124 Recorded    8  Furosemide 40 MG Oral Tablet; Take one tablet every morning and 1/2 tablet every   evening; Therapy: 45ZTV4688 to (Kiersten Izaguirre)  Requested for: 81HQS9378 Recorded    9  OneTouch UltraSoft Lancets Miscellaneous; test twice daily; Therapy: 78EBO8280 to (Tessa Gutierrez)  Requested for: 88EGD5827; Last   Rx:09Mar2016 Ordered    10  LORazepam 2 MG Oral Tablet; TAKE 1 TABLET TWICE DAILY AS NEEDED; Therapy: 86OJZ0410 to (Evaluate:45Qwi6408); Last Rx:11Feb2016 Ordered    11  DULoxetine HCl - 60 MG Oral Capsule Delayed Release Particles (Cymbalta); TAKE 1    CAPSULE TWICE DAILY; Therapy: 96ERS9359 to (Evaluate:88Pde7518)  Requested for: 90MWC3935; Last    Rx:08Rfg6486 Ordered    12  Sertraline HCl - 100 MG Oral Tablet; TAKE 2 TABLETS DAILY; Therapy: 74JYY8536 to (Evaluate:90Xlt7079)  Requested for: 81AAO2160; Last    Rx:83Xrw0089 Ordered    13  PredniSONE 5 MG Oral Tablet; TAKE ONE AND 1/2 TABLETS BY MOUTH DAILY; Therapy: 20TRQ9607 to (Mark Jacob)  Requested for: 41CIJ2814; Last    Rx:27Apr2016 Ordered    14  Aspirin 81 MG TABS; TAKE 1 TABLET DAILY; Therapy: 04GPL9485 to (Evaluate:19Jun2016) Recorded   15  Vitamin D3 3000 UNIT Oral Tablet; 1 TAB DAILY; Therapy: 76YXG4131 to (Evaluate:60Kny3712) Recorded    16  Pravastatin Sodium 80 MG Oral Tablet; take 1 tablet by mouth every day; Therapy: 64QUS9542 to (Alyce Meza)  Requested for: 35ZQN7523; Last    AA:23GGF7032 Ordered    17  Levothyroxine Sodium 75 MCG Oral Tablet; TAKE 1 TABLET DAILY AS DIRECTED;     Therapy: 85DRD5793 to  Requested for: 72VHY9247 Recorded    18  TraZODone HCl - 150 MG Oral Tablet; TAKE 1 TABLET AT BEDTIME; Therapy: 88TAH4816 to (Evaluate:85Gsz8384)  Requested for: 39UDS2009; Last    Rx:48Wzg3260 Ordered    19  ARIPiprazole 20 MG Oral Tablet; TAKE 1 TABLET AT BEDTIME; Therapy: 66ZZA4345 to (Evaluate:74Nof9432)  Requested for: 04RCU1889; Last    Rx:68Idz0629 Ordered    20  ROPINIRole HCl - 0 25 MG Oral Tablet; take 1 tablet by mouth twice daily; Therapy: 29HLW6727 to (Evaluate:68Bzo4224)  Requested for: 68EAA0915; Last    Rx:22Mar2016 Ordered    21  OneTouch Ultra Blue In [employer]; TEST TWICE A DAY; Therapy: 86VNK3120 to (Evaluate:64Ayf2213)  Requested for: 33FRU9364; Last    Rx:10Mar2016 Ordered    22  Keflex 500 MG Oral Capsule (Cephalexin Monohydrate); TAKE 1 CAPSULE BY MOUTH    EVERY 6 HOURS FOR 9 DAYS; Therapy: (Recorded:34Asz9242) to Recorded   23  Prograf 1 MG Oral Capsule (Tacrolimus); Therapy: (Recorded:12Zsl6023) to Recorded   24  Sucralfate 1 GM/10ML Oral Suspension; TAKE 10 ML EVERY 12 HOURS DAILY; Therapy: (Recorded:29Opn4105) to Recorded   25  Tylenol 325 MG Oral Tablet; Therapy: (Recorded:53Wmy7166) to Recorded    Allergies    1  Penicillins   2  Morphine Derivatives   3  Duricef TABS    End of Encounter Meds    1  Folic Acid 1 MG Oral Tablet; TAKE 1 TABLET DAILY AS DIRECTED; Therapy: 38MJN2096 to (Evaluate:61Tgm3669)  Requested for: 49QHL3498; Last   Rx:18App5496 Ordered    2  HydrALAZINE HCl - 50 MG Oral Tablet; TAKE 1 TABLET 3 TIMES DAILY; Therapy: 23IZB2333 to (Evaluate:26Jan2017)  Requested for: 57RHR4739; Last   Rx:29Wqc6233 Ordered    3  Pantoprazole Sodium 40 MG Oral Tablet Delayed Release; TAKE 1 TABLET DAILY; Therapy: 92QRN2815 to (Enrico Tavarez)  Requested for: 38VKH8436; Last   XI:79MCX1062 Ordered    4  Metoprolol Tartrate 50 MG Oral Tablet; take 1 tablet by mouth twice daily;    Therapy: 98DRU9610 to (Evaluate:06Jun2016)  Requested for: 01ZIE0612; Last   Rx:08Mar2016 Ordered    5  Catapres 0 3 MG Oral Tablet (CloNIDine HCl); TAKE 0 3MG BY MOUTH 2 TIMES A DAY  INDICATIONS: 0 3MG IN AM, 0 2MG AT NOON, AND 0 3MG IN PM;   Therapy: 03DBA4606 to (Evaluate:30Apr2017)  Requested for: 90WVK1330 Recorded   6  Sodium Bicarbonate 650 MG Oral Tablet; Take one tablet daily; Therapy: 14NVZ4742 to (Evaluate:12Apr2016)  Requested for: 82YKK4664 Recorded    7  AmLODIPine Besylate 5 MG Oral Tablet; TAKE 1 TABLET TWICE DAILY; Therapy: 21ZOG4702 to (Evaluate:30Apr2017)  Requested for: 24RFW4417 Recorded    8  Furosemide 40 MG Oral Tablet; Take one tablet every morning and 1/2 tablet every   evening; Therapy: 40WXB6081 to (Go Chinchilla)  Requested for: 41CVY4030 Recorded    9  OneTouch UltraSoft Lancets Miscellaneous; test twice daily; Therapy: 24UES3106 to (Korin Cavazos)  Requested for: 96TAS2898; Last   Rx:09Mar2016 Ordered    10  LORazepam 2 MG Oral Tablet; TAKE 1 TABLET TWICE DAILY AS NEEDED; Therapy: 63RCF3982 to (Evaluate:09Aug2016); Last Rx:11Feb2016 Ordered    11  DULoxetine HCl - 60 MG Oral Capsule Delayed Release Particles (Cymbalta); TAKE 1    CAPSULE TWICE DAILY; Therapy: 24UDL7620 to (Evaluate:09Aug2016)  Requested for: 96SAP9853; Last    Rx:11Feb2016 Ordered    12  Sertraline HCl - 100 MG Oral Tablet; TAKE 2 TABLETS DAILY; Therapy: 49CYX0464 to (Evaluate:09Aug2016)  Requested for: 81AQB9027; Last    Rx:11Feb2016 Ordered    13  PredniSONE 5 MG Oral Tablet; TAKE ONE AND 1/2 TABLETS BY MOUTH DAILY; Therapy: 85BZK4703 to (José Miguel Laureano)  Requested for: 28OQQ4155; Last    Rx:27Apr2016 Ordered    14  Aspirin 81 MG TABS; TAKE 1 TABLET DAILY; Therapy: 18TPA7060 to (Evaluate:19Jun2016) Recorded   15  Vitamin D3 3000 UNIT Oral Tablet; 1 TAB DAILY; Therapy: 37RQD8924 to (Evaluate:38Irc0542) Recorded    16  Pravastatin Sodium 80 MG Oral Tablet; take 1 tablet by mouth every day;     Therapy: 01BGF9942 to (Evaluate:44Rrs9459)  Requested for: 51NKQ3309; Last    AB:19SPI6275 Ordered    17  Levothyroxine Sodium 75 MCG Oral Tablet; TAKE 1 TABLET DAILY AS DIRECTED; Therapy: 42IRF2169 to  Requested for: 97THX0239 Recorded    18  TraZODone HCl - 150 MG Oral Tablet; TAKE 1 TABLET AT BEDTIME; Therapy: 08PJD7276 to (Evaluate:63Vol4615)  Requested for: 88FPH7613; Last    Rx:99Uuz3836 Ordered    19  ARIPiprazole 20 MG Oral Tablet; TAKE 1 TABLET AT BEDTIME; Therapy: 45UPR5078 to (Evaluate:54Jwz9874)  Requested for: 85BTM2951; Last    Rx:96Xxe8344 Ordered    20  ROPINIRole HCl - 0 25 MG Oral Tablet; take 1 tablet by mouth twice daily; Therapy: 41AJG6349 to (Evaluate:02Aky8837)  Requested for: 59DGW1915; Last    Rx:22Mar2016 Ordered    21  OneTouch Ultra Blue In [employer]; TEST TWICE A DAY; Therapy: 92YJI4312 to (Evaluate:99Lad9730)  Requested for: 76ZSU3081; Last    Rx:10Mar2016 Ordered    22  Keflex 500 MG Oral Capsule (Cephalexin Monohydrate); TAKE 1 CAPSULE BY MOUTH    EVERY 6 HOURS FOR 9 DAYS; Therapy: (Recorded:49Nhw4023) to Recorded   23  Prograf 1 MG Oral Capsule (Tacrolimus); Therapy: (Recorded:96Bqg2550) to Recorded   24  Sucralfate 1 GM/10ML Oral Suspension; TAKE 10 ML EVERY 12 HOURS DAILY; Therapy: (Recorded:81Zgf9426) to Recorded   25  Tylenol 325 MG Oral Tablet; Therapy: (Recorded:95Bzc3431) to Recorded    Future Appointments    Date/Time Provider Specialty Site   08/26/2016 10:00 AM EDGAR Mary E Melissa Maravilla   07/27/2016 04:15 PM EDGAR [place]  Psychiatry [facility] PSYCHIATRIC ASS   05/31/2016 09:00 AM Sally Puga LCSW  [facility] PSYCH [facility] PCP S SHYAM   05/18/2016 09:00 AM EDGAR [place]  Cardiology [address]     Patient Care Team    Care Team Member Role Specialty Office Number   Dmitri BHATT    Nephrology [phone]   Edu Rhodes  Gastroenterology Adult [phone]   Brennen Castelan MD  Vascular Surgery [phone]   Nelda Marques MD  Dermatology [id number] MD  Surgical Oncology [phone]   Barb Galdamez DPM   [phone]   [facility]  Urology [phone]   Radha Frost MD  Psychiatry [phone] [address] [facility] [phone]   [address]  Gastroenterology Adult [phone]   Saida Morrison MD  Hematology Oncology [phone]     Signatures   Electronically signed by : Raciel Carr RN; May 17 2016 11:03AM EST                       (Author)

## 2018-01-11 NOTE — MISCELLANEOUS
Message   Recorded as Task   Date: 06/19/2016 05:58 PM, Created By: Mateo Muro   Task Name: Miscellaneous   Assigned To: Brian Murders   Regarding Patient: Ceci Bonilla, Status: In Progress   Comment:    Sundeep Boyle - 19 Jun 2016 5:58 PM     TASK CREATED  CBC w diff and BMP in 1 week re: hypoNa and elevated WBC  Please update med list, including any OTC meds and also, see if pt has had any fevers   Ana Maria Stark - 20 Jun 2016 9:10 AM     TASK REASSIGNED: Previously Assigned To Daysi Calvillo 72 - 20 Jun 2016 2:03 PM     TASK IN PROGRESS   CBC and BMP ordered for 1 week, med list is updated and new OTC meds  Pt is not c/o of any fevers  [facility] 6/20/2016      Active Problems    1  Abnormal EKG (794 31) (R94 31)   2  Acid reflux disease (530 81) (K21 9)   3  Acute on chronic diastolic congestive heart failure (428 33,428 0) (I50 33)   4  Anemia (285 9) (D64 9)   5  Atrial fibrillation (427 31) (I48 91)   6  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)   7  Benign hypertensive CKD (403 10) (I12 9)   8  Bilateral leg edema (782 3) (R60 0)   9  Bruit of left carotid artery (785 9) (R09 89)   10  Cataract (366 9) (H26 9)   11  Chronic diastolic congestive heart failure (428 32,428 0) (I50 32)   12  Chronic kidney disease, stage 3 (585 3) (N18 3)   13  Cocaine abuse in remission (305 63) (F14 10)   14  Cognitive changes (799 59) (R41 89)   15  Constipation (564 00) (K59 00)   16  Depression with anxiety (300 4) (F41 8)   17  Diabetes mellitus with nephropathy (250 40,583 81) (E11 21)   18  Diabetic Gastropathy (537 9)   19  Diabetic polyneuropathy (250 60,357 2) (E11 42)   20  Diabetic retinopathy (250 50,362 01) (E11 319)   21  Diabetic Ulcer Of The Left Foot (250 80)   22  ROD (dyspnea on exertion) (786 09) (R06 09)   23  Drug-induced Essential Tremor (333 1)   24  Dysuria (788 1) (R30 0)   25  Encephalopathy (348 30) (G93 40)   26   Encounter for screening mammogram for breast cancer (V76 12) (Z12 31)   27  Esophagitis, reflux (530 11) (K21 0)   28  External Hemorrhoids (455 3)   29  FELIPE (generalized anxiety disorder) (300 02) (F41 1)   30  H/O kidney transplant (V42 0) (Z94 0)   31  Heart palpitations (785 1) (R00 2)   32  Hospital discharge follow-up (V67 59) (Z09)   33  Hyperlipidemia (272 4) (E78 5)   34  Hyperplastic colon polyp (211 3) (K63 5)   35  Hypertension (401 9) (I10)   36  Hypothyroidism (244 9) (E03 9)   37  Increased white blood cell count (288 60) (D72 829)   38  Insomnia (780 52) (G47 00)   39  Irritable bowel syndrome (564 1) (K58 9)   40  Major depressive disorder, recurrent episode, in full remission (296 36) (F33 42)   41  Memory loss (780 93) (R41 3)   42  Metabolic acidosis (443 9) (E87 2)   43  MGUS (monoclonal gammopathy of unknown significance) (273 1) (D47 2)   44  Neck pain (723 1) (M54 2)   45  Nonrheumatic aortic valve insufficiency (424 1) (I35 1)   46  Osteopenia (733 90) (M85 80)   47  Osteoporosis (733 00) (M81 0)   48  Peripheral vascular disease (443 9) (I73 9)   49  Post traumatic stress disorder (309 81) (F43 10)   50  Proteinuria (791 0) (R80 9)   51  Rectal polyp (569 0) (K62 1)   52  Restless legs syndrome (333 94) (G25 81)   53  Secondary hyperparathyroidism, renal (588 81) (N25 81)   54  Sinus Tachycardia (427 89)   55  SOB (shortness of breath) (786 05) (R06 02)   56  Tremor (781 0) (R25 1)   57  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 (825 45) (L67 52)   58  Unsteady gait (781 2) (R26 81)   59  Urinary tract infection (599 0) (N39 0)   60  UTI (urinary tract infection), bacterial (599 0,041 9) (N39 0,A49 9)   61  Vaginal itching (698 1) (L29 8)    Current Meds   1  AmLODIPine Besylate 5 MG Oral Tablet; TAKE 1 TABLET TWICE DAILY; Therapy: 42IEV1518 to (Evaluate:30Apr2017)  Requested for: 92DZT5825 Recorded   2  ARIPiprazole 20 MG Oral Tablet; TAKE 1 TABLET AT BEDTIME;    Therapy: 87GEM4028 to (Evaluate:06Ofu9136) Requested for: 29NLX6609; Last   Rx:86Qzk2811 Ordered   3  Aspirin 81 MG TABS; TAKE 1 TABLET DAILY; Therapy: 75PQL0650 to (Evaluate:19Jun2016) Recorded   4  Caltrate 600 TABS; TAKE 1 TABLET TWICE DAILY; Therapy: (Recorded:17Jun2016) to Recorded   5  Catapres 0 3 MG Oral Tablet (CloNIDine HCl); TAKE 0 3MG BY MOUTH 2 TIMES A DAY  INDICATIONS: 0 3MG IN AM, 0 2MG AT NOON, AND 0 3MG IN PM;   Therapy: 73HWN3378 to (Evaluate:30Apr2017)  Requested for: 77BPU4203 Recorded   6  Doxazosin Mesylate 1 MG Oral Tablet; TAKE 1 TABLET AT BEDTIME; Therapy: (Recorded:17Jun2016) to Recorded   7  DULoxetine HCl - 60 MG Oral Capsule Delayed Release Particles (Cymbalta); TAKE 1   CAPSULE TWICE DAILY; Therapy: 21GBD0759 to (Evaluate:09Aug2016)  Requested for: 05HFQ6956; Last   Rx:99Qaj8130 Ordered   8  Folic Acid 1 MG Oral Tablet; TAKE 1 TABLET DAILY AS DIRECTED; Therapy: 10ALF4796 to (Evaluate:82Bbm0534)  Requested for: 40LOG3451; Last   Rx:94Xrn9862 Ordered   9  Furosemide 40 MG Oral Tablet; TAKE 1 TABLET DAILY; Therapy: 57PRA8869 to (Jose Mckeon)  Requested for: 34IXY1280 Recorded   10  HydrALAZINE HCl - 50 MG Oral Tablet; TAKE 1 TABLET 3 TIMES DAILY; Therapy: 56CXJ3080 to (Evaluate:26Jan2017)  Requested for: 83CYY2330; Last    Rx:96Cvy8833 Ordered   11  Lactulose 10 GM/15ML Oral Solution; TAKE 30 ML DAILY; Therapy: (Recorded:17Jun2016) to Recorded   12  Levothyroxine Sodium 88 MCG Oral Tablet; TAKE 1 TABLET DAILY AS DIRECTED; Therapy: 47YON8547 to  Requested for: 50RGX8013 Recorded   13  LORazepam 2 MG Oral Tablet; TAKE 1 TABLET TWICE DAILY AS NEEDED; Therapy: 06GJL7807 to (Evaluate:39Ysg5672); Last Rx:53Snh9359 Ordered   14  Metoprolol Tartrate 50 MG Oral Tablet; take 1 tablet by mouth twice daily; Therapy: 85DIG6026 to (Evaluate:09Jun2017)  Requested for: 66VRM6527; Last    Rx:14Jun2016 Ordered   15  [facility]Touch Ultra Blue [facility]; TEST TWICE A DAY;     Therapy: 54EWE1225 to (Evaluate:26Stb2071) Requested for: 24OBI1641; Last    Rx:10Mar2016 Ordered   16  OneTouch UltraSoft Lancets Miscellaneous; test twice daily; Therapy: 29XVI5817 to (Claudetta Altes)  Requested for: 83QXQ6975; Last    Rx:09Mar2016 Ordered   17  Pantoprazole Sodium 40 MG Oral Tablet Delayed Release; TAKE 1 TABLET DAILY; Therapy: 38TMG2706 to (Karan Myles)  Requested for: 40RDI4126; Last    Rx:05May2016 Ordered   18  Pravastatin Sodium 80 MG Oral Tablet; take 1 tablet by mouth every day; Therapy: 89TIJ7500 to (Maris Jeffries)  Requested for: 16MNA2963; Last    GO:42FCJ3514 Ordered   19  PredniSONE 5 MG Oral Tablet; TAKE ONE AND 1/2 TABLETS BY MOUTH DAILY; Therapy: 63UCG1107 to (Inez Swanson)  Requested for: 79KGH4023; Last    Rx:25Aqb5372 Ordered   20  Prograf 1 MG Oral Capsule (Tacrolimus); Therapy: (Recorded:10May2016) to Recorded   21  ROPINIRole HCl - 0 25 MG Oral Tablet; take 1 tablet by mouth twice daily; Therapy: 50EPZ8148 to (Evaluate:86Yyl7735)  Requested for: 74HRG6554; Last    Rx:22Mar2016 Ordered   22  Sertraline HCl - 100 MG Oral Tablet; TAKE 2 TABLETS DAILY; Therapy: 68BHS2537 to (Evaluate:79Xgp7180)  Requested for: 56IFH6502; Last    Rx:28Wzv4335 Ordered   23  Sodium Bicarbonate 650 MG Oral Tablet; Take one tablet daily; Therapy: 51FRR5227 to (Evaluate:12Apr2016)  Requested for: 20ZTR6792 Recorded   24  TraZODone HCl - 150 MG Oral Tablet; TAKE 1 TABLET AT BEDTIME; Therapy: 19DKB9509 to (Evaluate:07Hbq7732)  Requested for: 16EAE7602; Last    Rx:03Vxy5837 Ordered   25  Vitamin D3 1000 UNIT Oral Capsule; TAKE 3 PILLS AT NOON BY MOUTH; Therapy: (Recorded:17Jun2016) to Recorded    Allergies    1  Penicillins   2  Morphine Derivatives   3  Duricef TABS    Plan  Chronic kidney disease, stage 3    · (1) BASIC METABOLIC PROFILE; Status:Active - Retrospective By Protocol  Authorization; Requested NDS:[id number];    · (1) CBC/ PLT (NO DIFF);  Status:Active - Retrospective By Protocol Authorization;   Requested GWO:[id number];     Signatures   Electronically signed by : EDGAR Walker ; Jun 20 2016  4:46PM EST

## 2018-01-11 NOTE — MISCELLANEOUS
Message   Recorded as Task   Date: 01/27/2016 05:26 PM, Created By: Tin Barr   Task Name: Miscellaneous   Assigned To: Cheryl Blackmon   Regarding Patient: Julienne Webb, Status: In Progress   Jay Bartlett - 27 Jan 2016 5:26 PM     TASK CREATED  also, decrease sodium bicarb to 1 tablet daily   Cheryl Blackmon - 28 Jan 2016 10:14 AM     TASK IN PROGRESS   Cheryl Blackmon - 28 Jan 2016 10:14 AM     TASK EDITED  I left a message for the patient to call the office  I spoke with the patient and she is aware of the above  kja      Active Problems    1  Abnormal EKG (794 31) (R94 31)   2  Acute on chronic diastolic congestive heart failure (428 33,428 0) (I50 33)   3  Anemia (285 9) (D64 9)   4  Atrial fibrillation (427 31) (I48 91)   5  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)   6  Benign hypertensive CKD (403 10) (I12 9)   7  Bilateral leg edema (782 3) (R60 0)   8  Cataract (366 9) (H26 9)   9  Chronic kidney disease, stage 3 (585 3) (N18 3)   10  Cocaine abuse in remission (305 63) (F14 10)   11  Cognitive changes (799 59) (R41 89)   12  Constipation (564 00) (K59 00)   13  Diabetic Gastropathy (537 9)   14  Diabetic Nephropathy (583 81)   15  Diabetic polyneuropathy (250 60,357 2) (E11 42)   16  Diabetic retinopathy (250 50,362 01) (E11 319)   17  Diabetic Ulcer Of The Left Foot (250 80)   18  ROD (dyspnea on exertion) (786 09) (R06 09)   19  Drug-induced Essential Tremor (333 1)   20  Encounter for screening mammogram for breast cancer (V76 12) (Z12 31)   21  Esophageal reflux (530 81) (K21 9)   22  Esophagitis, reflux (530 11) (K21 0)   23  External Hemorrhoids (455 3)   24  FELIPE (generalized anxiety disorder) (300 02) (F41 1)   25  H/O kidney transplant (V42 0) (Z94 0)   26  Heart palpitations (785 1) (R00 2)   27  Hyperlipidemia (272 4) (E78 5)   28  Hyperplastic colon polyp (211 3) (K63 5)   29  Hypertension (401 9) (I10)   30  Hypothyroidism (244 9) (E03 9)   31   Increased white blood cell count (288 60) (D72 829)   32  Irritable bowel syndrome (564 1) (K58 9)   33  Major depressive disorder, recurrent episode, 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) (F33 41)   34  Memory loss (780 93) (R41 3)   35  Metabolic acidosis (227 9) (E87 2)   36  MGUS (monoclonal gammopathy of unknown significance) (273 1) (D47 2)   37  Neck pain (723 1) (M54 2)   38  Nonrheumatic aortic valve insufficiency (424 1) (I35 1)   39  Osteopenia (733 90) (M85 80)   40  Osteoporosis (733 00) (M81 0)   41  Peripheral vascular disease (443 9) (I73 9)   42  Post traumatic stress disorder (309 81) (F43 10)   43  Proteinuria (791 0) (R80 9)   44  Rectal polyp (569 0) (K62 1)   45  Restless legs syndrome (333 94) (G25 81)   46  Secondary hyperparathyroidism, renal (588 81) (N25 81)   47  Sinus Tachycardia (427 89)   48  SOB (shortness of breath) (786 05) (R06 02)   49  Tremor (781 0) (R25 1)   50  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 (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 26) (X49 14)    Current Meds   1  Abilify 20 MG Oral Tablet (ARIPiprazole); TAKE 1 TABLET AT BEDTIME; Therapy: 30KWX3934 to (Dionte Wan)  Requested for: 34Lln5350; Last   Rx:51Ang5140 Ordered   2  AmLODIPine Besylate 10 MG Oral Tablet; take 1/2 tab  BID; Therapy: 83VXW5218 to (Evaluate:29Hvs8426)  Requested for: 32Etu1292 Recorded   3  Aspirin 81 MG Oral Tablet; TAKE 1 TABLET DAILY; Therapy: 73QLU1112 to (Evaluate:19Jun2016) Recorded   4  Carafate 1 GM/10ML Oral Suspension; Therapy: (0699 982 13 20) to Recorded   5  Citracal TABS; Take one tablet twice daily Recorded   6  Clindamycin HCl - 300 MG Oral Capsule; TAKE 2 CAPSULES 1 HOUR PRIOR TO   DENTAL APPOINTMENT; Therapy: 88Tpu1105 to (Evaluate:43Uwa5992)  Requested for: 22JAY8227; Last   FF:29RXZ4911 Ordered   7  CloNIDine HCl - 0 1 MG Oral Tablet; Take three tablets every morning, two tablets every   noon, and three tablets every evening; Therapy: 56ACX1472 to (Evaluate:14Mar2016)  Requested for: 20Mar2015;  Last Rx:20Mar2015 Ordered   8  DULoxetine HCl - 60 MG Oral Capsule Delayed Release Particles (Cymbalta); TAKE 1   CAPSULE TWICE DAILY; Therapy: 94IUV9773 to (Jina Rebollar)  Requested for: 63SMV6051; Last   Rx:81Cwv9719; Status: ACTIVE - Renewal Denied Ordered   9  Folic Acid 1 MG Oral Tablet; TAKE 1 TABLET DAILY AS DIRECTED; Therapy: 34ONN5428 to (Evaluate:52Vus0383)  Requested for: 35ITL7053; Last   Rx:59Fro9631 Ordered   10  Furosemide 40 MG Oral Tablet; Take 1 tablet twice daily; Therapy: 91YJG2386 to (Evaluate:11Jun2016)  Requested for: 57FKB2148 Recorded   11  Generlac 10 GM/15ML SYRP Recorded   12  HydrALAZINE HCl - 50 MG Oral Tablet; TAKE 1 TABLET 3 TIMES DAILY; Therapy: 59JKM3932 to (Evaluate:16Jan2017)  Requested for: 49MSO7065 Recorded   13  Hydrocodone-Acetaminophen 5-325 MG Oral Tablet; TAKE 1 TABLET EVERY 6 HOURS    AS NEEDED FOR PAIN;    Therapy: 86ASU5380 to (Evaluate:07Jan2016); Last Rx:21Grj4254 Ordered   14  Levothyroxine Sodium 75 MCG Oral Tablet; take 1 tablet by mouth every day; Therapy: 65SWY7404 to (Evaluate:27Lli7187)  Requested for: 70QJI9948; Last    Rx:04Axs1945 Ordered   15  LORazepam 2 MG Oral Tablet; TAKE 1 TABLET TWICE DAILY AS NEEDED; Therapy: 61YPP0979 to (Evaluate:19Jun2016) Recorded   16  Magnesium  (64 Mg) MG TBCR; TAKE 1 TABLET DAILY; Therapy: 74ZTT2444 to (Evaluate:88Nwc9398); Last Rx:05Jun2013 Ordered   17  Metoprolol Tartrate 50 MG Oral Tablet; take 1 tablet by mouth twice a day; Therapy: 81GXH8389 to (Geneva Aguilar)  Requested for: 08REB3262; Last    Rx:26Jan2016 Ordered   18  OneTouch Ultra Blue In Vitro Strip; TEST EVERY DAY AND AS NEEDED; Therapy: 69QZN8069 to (Evaluate:19Apr2016)  Requested for: 09Jhq6905; Last    Rx:73Hyj9430 Ordered   19  Pantoprazole Sodium 40 MG Oral Tablet Delayed Release; TAKE 1 TABLET DAILY; Therapy: 44ZVD9790 to (Evaluate:02Apr2016)  Requested for: 64PAG1799; Last    Rx:05Oct2015 Ordered   20   Pravastatin Sodium 80 MG Oral Tablet; take 1 tablet by mouth every day; Therapy: 15OPM6501 to (Evaluate:76Tqr7382)  Requested for: 98Kke0886; Last    Rx:65Zal0379 Ordered   21  PredniSONE 5 MG Oral Tablet; Take 1-1/2 tablets daily; Therapy: 22XSM8926 to (Evaluate:14Mar2016)  Requested for: 20Mar2015; Last    Rx:20Mar2015 Ordered   22  ROPINIRole HCl - 0 25 MG Oral Tablet; take 1 tablet by mouth twice a day; Therapy: 67XTZ4604 to (Gregory Parent)  Requested for: 05Gkd1817; Last    Rx:37Jhx2586 Ordered   23  Sertraline HCl - 100 MG Oral Tablet; TAKE 2 TABLETS DAILY; Therapy: 61OFG6329 to (Gregory Parent)  Requested for: 77VYJ4601; Last    Rx:86Cqz5925; Status: ACTIVE - Renewal Denied Ordered   24  Sodium Bicarbonate 650 MG Oral Tablet; Take one tablet daily; Therapy: 97AJU2750 to (Evaluate:12Apr2016)  Requested for: 26GDG5759 Recorded   25  Tacrolimus 1 MG Oral Capsule; TAKE 4 CAPSULES every morning and 3 capsules every    evening; Therapy: 43PWA4850 to (Jefe Betancourt)  Requested for: 75MQS5118; Last    Rx:15Oct2015 Ordered   26  TraZODone HCl - 150 MG Oral Tablet; TAKE 1 TABLET AT BEDTIME; Therapy: 41YCO2785 to (Gregory Parent)  Requested for: 02XDZ6041; Last    Rx:29Gal9533; Status: ACTIVE - Renewal Denied Ordered   27  Vitamin D3 3000 UNIT Oral Tablet; 1 TAB DAILY; Therapy: 94HTH1705 to (Evaluate:07Jgb0667) Recorded    Allergies    1  Penicillins   2  Morphine Derivatives   3   33 [facility] TABS    Signatures   Electronically signed by : EDGAR Bernal ; Jan 28 2016  2:36PM EST

## 2018-01-11 NOTE — MISCELLANEOUS
Message     Recorded as Task   Date: 11/10/2017 09:11 AM, Created By: Dory Melvin   Task Name: Follow Up   Assigned To: Erica Bazan   Regarding Patient: El Wilder, Status: Active   Comment:    Dory Melvin - 10 Nov 2017 9:11 AM     TASK CREATED  hello    can patient have appt with me in at next available (pt missed this past week)    can patient be notified that tac level is appropriate, Cr is 1 9, stable, bicarb is lower    - pt is on sodium bicarb tabs one tab once a day, can she be advised to increase to 1 tab twice daily    Thank you    jessica  Pt aware of the above  Active Problems    1  Abnormal EKG (794 31) (R94 31)   2  Acid reflux disease (530 81) (K21 9)   3  Anemia (285 9) (D64 9)   4  Antibiotic prophylaxis for dental procedure indicated due to prior joint replacement   (V58 62) (Z79 2)   5  Atrial fibrillation (427 31) (I48 91)   6  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)   7  Benign hypertensive CKD (403 10) (I12 9)   8  Bilateral carotid bruits (785 9) (R09 89)   9  Bilateral leg edema (782 3) (R60 0)   10  Bruit of left carotid artery (785 9) (R09 89)   11  Cataract (366 9) (H26 9)   12  Chronic constipation (564 00) (K59 09)   13  Chronic diastolic congestive heart failure (428 32,428 0) (I50 32)   14  Chronic kidney disease, stage 4 (severe) (585 4) (N18 4)   15  Cocaine abuse in remission (305 63) (F14 11)   16  Cognitive changes (799 59) (R41 89)   17  Colon cancer screening (V76 51) (Z12 11)   18  Constipation (564 00) (K59 00)   19  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, without long-term    current use of insulin (250 61) (E10 49)   20  Diabetes mellitus with diabetic nephropathy (250 40,583 81) (E11 21)   21  Diabetic Gastropathy (537 9)   22  Diabetic polyneuropathy (250 60,357 2) (E11 42)   23  Diabetic retinopathy (250 50,362 01) (E11 319)   24  Diabetic Ulcer Of The Left Foot (250 80)   25  Diastolic dysfunction (741 4) (I51 9)   26   ROD (dyspnea on exertion) (786 09) (R06 09)   27  Drug-induced Essential Tremor (333 1)   28  Encounter for screening mammogram for breast cancer (V76 12) (Z12 31)   29  Esophagitis, reflux (530 11) (K21 0)   30  External Hemorrhoids (455 3)   31  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)   32  Fatigue (780 79) (R53 83)   33  FELIPE (generalized anxiety disorder) (300 02) (F41 1)   34  H/O kidney transplant (V42 0) (Z94 0)   35  Heart palpitations (785 1) (R00 2)   36  Hospital discharge follow-up (V67 59) (Z09)   40  Hyperlipidemia (272 4) (E78 5)   38  Hyperplastic colon polyp (211 3) (K63 5)   39  Hypertension (401 9) (I10)   40  Hyponatremia (276 1) (E87 1)   41  Hypothyroidism (244 9) (E03 9)   42  Increased frequency of urination (788 41) (R35 0)   43  Indolent multiple myeloma (203 00) (C90 00)   44  Irritable bowel syndrome (564 1) (K58 9)   45  Major depressive disorder, recurrent episode, in full remission (296 36) (F33 42)   46  Memory loss (780 93) (R41 3)   47  Memory loss or impairment (780 93) (R41 3)   48  Metabolic acidosis (067 8) (E87 2)   49  MGUS (monoclonal gammopathy of unknown significance) (273 1) (D47 2)   50  Need for influenza vaccination (V04 81) (Z23)   51  Nonrheumatic aortic valve insufficiency (424 1) (I35 1)   52  OAB (overactive bladder) (596 51) (N32 81)   53  Osteopenia (733 90) (M85 80)   54  Osteoporosis (733 00) (M81 0)   55  Other calculus in bladder (594 1) (N21 0)   56  Other insomnia (780 52) (G47 09)   57  Peripheral vascular disease (443 9) (I73 9)   58  Post-traumatic stress disorder, chronic (309 81) (F43 12)   59  Preoperative cardiovascular examination (V72 81) (Z01 810)   60  Proteinuria (791 0) (R80 9)   61  Pyelonephritis (590 80) (N12)   62  Rectal polyp (569 0) (K62 1)   63  Recurrent UTI (599 0) (N39 0)   64  Restless legs syndrome (333 94) (G25 81)   65  Secondary hyperparathyroidism, renal (588 81) (N25 81)   66  Snoring (786 09) (R06 83)   67   SOB (shortness of breath) (786 05) (R06 02)   68  Status post amputation of right great toe (V49 71) (Z89 411)   69  Status post pancreas transplantation (V42 83) (Z94 83)   70  Status post transmetatarsal amputation of left foot (V49 73) (Z89 432)   71  Tremor (781 0) (R25 1)   72  Unsteady gait (781 2) (R26 81)   73  Urinary incontinence (788 30) (R32)   74  Weakness (780 79) (R53 1)    Current Meds   1  Acetaminophen 325 MG Oral Tablet; TAKE 1 TO 2 TABLETS EVERY 6 HOURS AS   NEEDED Recorded   2  AmLODIPine Besylate 10 MG Oral Tablet; TAKE 1 TABLET BY MOUTH EVERY   MORNING AND 1/2 TABLET BY MOUTH EVERY EVENING; Therapy: 59BIM4140 to (Alren Hernandez)  Requested for: 11Aug2017; Last   Rx:11Aug2017 Ordered   3  ARIPiprazole 30 MG Oral Tablet; TAKE 1 TABLET AT BEDTIME; Therapy: 41LEF4237 to (Evaluate:23Apr2018)  Requested for: 25Oct2017; Last   Rx:25Oct2017 Ordered   4  Aspirin 81 MG TABS; TAKE 1 TABLET DAILY; Therapy: 61CKI3826 to (Evaluate:19Jun2016) Recorded   5  BD Pen Needle Mary U/F 32G X 4 MM Miscellaneous; Use 4x daily; Therapy: 99BCA0935 to (Evaluate:11Aug2018)  Requested for: 88ZTK4961; Last   Rx:67Kbz4345 Ordered   6  Catapres 0 1 MG Oral Tablet (CloNIDine HCl); take 3 tab  in am, 2 tab at noon, 3 tab  at   night; Therapy: 16UQH5290 to (Arlen Hernandez)  Requested for: 22CQL4092; Last   Rx:07Nov2017 Ordered   7  CloNIDine HCl - 0 1 MG Oral Tablet; TAKE 3 TABLETS BY MOUTH IN THE MORNING, 2   TABLETS AT NOON,& 3 TABLETSAT NIGHT; Therapy: 22LDQ2888 to (Arlen Hernandez)  Requested for: 12XOJ1565; Last   Rx:07Nov2017 Ordered   8  Doxazosin Mesylate 1 MG Oral Tablet; TAKE 1 TABLET Bedtime  Requested for:   69YJT2631; Last Rx:05Apr2017 Ordered   9  DULoxetine HCl - 60 MG Oral Capsule Delayed Release Particles (Cymbalta); TAKE 1   CAPSULE TWICE DAILY; Therapy: 68SMO4615 to (Nicolas Collado)  Requested for: 25Oct2017; Last   TW:10XVP8202 Ordered   10   Folic Acid 1 MG Oral Tablet; TAKE 1 TABLET DAILY AS DIRECTED; Therapy: 43NPS2591 to (Evaluate:14Oct2018)  Requested for: 61KZN7874; Last    Rx:89Gge1409 Ordered   11  Furosemide 20 MG Oral Tablet; TAKE 1 TABLET DAILY; Therapy: 32Ukh0179 to (Evaluate:50Aib4050) Recorded   12  HumaLOG KwikPen 100 UNIT/ML Subcutaneous Solution Pen-injector; Inject 5 units 3    times daily with meals; Therapy: 28QYY2822 to (Evaluate:44Klo9072)  Requested for: 04Gqe3771 Recorded   13  HydrALAZINE HCl - 50 MG Oral Tablet; TAKE 1 TABLET 3 TIMES DAILY; Therapy: 90PGQ0817 to (Buck File)  Requested for: 90DGX2798; Last    Rx:13Mar2017 Ordered   14  Lactulose 10 GM/15ML Oral Solution; TAKE 30 ML DAILY; Therapy: 75YWZ1773 to (Last Rx:02Jun2017)  Requested for: 02Jun2017 Ordered   15  Levothyroxine Sodium 88 MCG Oral Tablet; take 1 tablet by mouth daily; Therapy: 48CRH9658 to (Evaluate:05Duh2335)  Requested for: 53VAK3691 Recorded   16  LORazepam 2 MG Oral Tablet; Take 1 tablet 3 times daily as needed; Therapy: 31BEF2331 to (Evaluate:05Jun2018)  Requested for: 25Oct2017; Last    TP:71ZRJ9695 Ordered   17  Metoprolol Tartrate 50 MG Oral Tablet; take 1 tablet by mouth twice daily; Therapy: 75ANZ3737 to (Evaluate:36Eci0866)  Requested for: 27Jun2017; Last    EA:39PHU8624 Ordered   18  OneTouch Ultra Blue In [employer]; Testing 5 times daily as directed by physician; Therapy: 56FJW2891 to (Evaluate:17Egi5068)  Requested for: 26Oct2017; Last    Rx:45Ame5435 Ordered   19  OneTouch UltraSoft Lancets Miscellaneous; test twice daily; Therapy: 38NZB0021 to (Evaluate:27Kma8867)  Requested for: 99Ywn1572 Recorded   20  Pantoprazole Sodium 40 MG Oral Tablet Delayed Release; take 1 tablet by mouth every    day; Therapy: 82IRJ2827 to (Evaluate:24Bmt8283)  Requested for: 14Oct2017; Last    Rx:57Lzg9037 Ordered   21  Pravastatin Sodium 80 MG Oral Tablet; take 1 tablet by mouth every day;     Therapy: 48TTG8501 to (Evaluate:26Nhv4696)  Requested for: 15VGP3554; Last    Rx:22Jan2017 Ordered   22  PredniSONE 5 MG Oral Tablet; TAKE 1 TABLET DAILY; Therapy: 57LAJ9018 to (Evaluate:31Ise6292)  Requested for: 92TTW4503; Last    Rx:17Oct2017 Ordered   23  ROPINIRole HCl - 0 25 MG Oral Tablet; take 1 tablet by mouth twice daily; Therapy: 32IYE2320 to (Ara Hagen)  Requested for: 94Nir2371; Last    Rx:04Rdq2772 Ordered   24  Sertraline HCl - 100 MG Oral Tablet; TAKE 2 TABLETS DAILY; Therapy: 93WYF9009 to (Nicki Terrell)  Requested for: 23DCQ3111; Last    OP:74BGY0182 Ordered   25  Sodium Bicarbonate 650 MG Oral Tablet; Take one tablet daily; Therapy: 71KJF7423 to (Evaluate:12Apr2016)  Requested for: 11RBF3450 Recorded   26  Sulfamethoxazole-Trimethoprim 400-80 MG Oral Tablet; take 1 tablet every other day; Therapy: 82EEM1611 to (Last MW:84YYH3981) Ordered   27  Tacrolimus 1 MG Oral Capsule (Prograf); Take 4 in the morning and 3 in the evening     Requested for: 14Fnz1083; Last Rx:61Xtr1342 Ordered   28  Toujeo SoloStar 300 UNIT/ML Subcutaneous Solution Pen-injector; INJECT 20 UNIT    Bedtime; Therapy: 27YIZ6801 to (Evaluate:93Tjb4219)  Requested for: 45Mwa2444 Recorded   29  TraZODone HCl - 150 MG Oral Tablet; TAKE 1 TABLET AT BEDTIME; Therapy: 11DGZ7983 to (Nicki Terrell)  Requested for: 25Oct2017; Last    NU:16DBQ7610 Ordered   30  Vitamin D3 1000 UNIT Oral Capsule; TAKE 3 PILLS AT NOON BY MOUTH; Therapy: (Recorded:17Jun2016) to Recorded    Allergies    1  Penicillins   2  Morphine Derivatives   3  Duricef TABS   4   Myrbetriq TB24    Signatures   Electronically signed by : Edin Romero, ; Nov 13 2017 10:07AM EST                       (Author)

## 2018-01-11 NOTE — RESULT NOTES
Message    Is on Cipro- resistant to the E  Coli  Limited options d/t her PCN allergy with a reaction of hives  Will put her on Nitrofurantoin and recheck UA in one week after after completion of ax  Verified Results  (1) URINE CULTURE 12Apr2016 12:40PM Hayley Puri     Test Name Result Flag Reference   CLINICAL REPORT (Report)     Test:        Urine culture  Specimen Source:  Urine, Unspecified Source  Specimen Type:   Urine  Specimen Date:   4/12/2016 12:40 PM  Result Date:    4/14/2016 9:29 AM  Result Status:   Final result  Resulting Lab:   [facility] [address] [facility] [address]            Tel: [phone]                 CULTURE                                       ------------------                                   >100,000 cfu/ml Escherichia coli      SUSCEPTIBILITY                                   ------------------                                                       Escherichia coli  METHOD                 SARTHAK  -------------------------------------  --------------------------  AMPICILLIN ($$)             >16 00 ug/ml Resistant  AMPICILLIN + SULBACTAM ($)       <=8/4 ug/ml  Susceptible  AZTREONAM ($$$)             <=8 ug/ml   Susceptible  CEFAZOLIN ($)              <=8 00 ug/ml Susceptible  CIPROFLOXACIN ($)            >2 00 ug/ml  Resistant  GENTAMICIN ($$)             <=4 ug/ml   Susceptible  LEVOFLOXACIN ($)            >4 00 ug/ml  Resistant  NITROFURANTOIN             64 ug/ml   Intermediate  PIPERACILLIN + TAZOBACTAM ($$$)     <=16 ug/ml  Susceptible  TETRACYCLINE              >8 ug/ml   Resistant  TOBRAMYCIN ($)             <=4 ug/ml   Susceptible  TRIMETHOPRIM + SULFAMETHOXAZOLE ($$$)  >2/38 ug/ml  Resistant       Signatures   Electronically signed by : MER Saldana;  Apr 15 2016  9:39AM EST                       (Author)

## 2018-01-11 NOTE — MISCELLANEOUS
Assessment    1  Urinary incontinence (788 30) (R32)   2  Hypertension (401 9) (I10)   3  Recurrent UTI (599 0) (N39 0)   4  Chronic kidney disease, stage 4 (severe) (585 4) (N18 4)   5  H/O kidney transplant (V42 0) (Z94 0)   6  Hypothyroidism (244 9) (E03 9)   7  Diabetes mellitus with diabetic nephropathy (250 40,583 81) (E11 21)    Plan  Hyperlipidemia    · Pravastatin Sodium 80 MG Oral Tablet; take 1 tablet by mouth every day   Rx By: George Prieto; Dispense: 60 Days ; #:90 TAB; Refill: 3; For: Hyperlipidemia; JACK = N; Verified Transmission to [place] [id number]  Hypothyroidism    · Levothyroxine Sodium 88 MCG Oral Tablet; take 1 tablet by mouth daily   Rx By: George Prieto; Dispense: 30 Days ; #:90 Tablet; Refill: 3; For: Hypothyroidism; JACK = N; Record  Recurrent UTI    · Sulfamethoxazole-Trimethoprim 400-80 MG Oral Tablet; take 1 tablet every other  day   Rx By: George Prieto; Dispense: 0 Days ; #:30 Tablet; Refill: 0; For: Recurrent UTI; JACK = N; Record    Discussion/Summary  Discussion Summary:   Patient presents for LILLY visit  She was hospitalized at [address] [place] [address] 10/30/17 - 11/1/17 for urinary incontinence  1 Urinary incontinence- resolved  2 Recurrent UTI 's - patient was started on Bactrim one tablet every other day for UTI prophylaxis  3 HTN -continue current BP medications  Follow a low-sodium diet, regular exercise  4 CKD stage 4 / H/o kidney transplant - management per nephrology  Patient has appointment with nephrologist Dr Popeye Rinaldi tomorrow  5 Hypothyroidism -continue Levothyroxine 88 mcg daily  6 Type 1 DM with diabetic neuropathy -continue current Insulin regimen  Follow a low carb diet  Follow-up with endocrinology next month     Counseling Documentation With Imm: The patient was counseled regarding diagnostic results, instructions for management, risk factor reductions, risks and benefits of treatment options, importance of compliance with treatment  Medication SE Review and Pt Understands Tx: Possible side effects of new medications were reviewed with the patient/guardian today  The treatment plan was reviewed with the patient/guardian  The patient/guardian understands and agrees with the treatment plan      Chief Complaint  Chief Complaint Free Text Note Form: Urinary incontinence  History of Present Illness  TCM Communication [facility]: The patient is being contacted for follow-up after hospitalization  She was hospitalized at [facility]  The dates of hospitalization: 10/30/2017-11/01/2017, date of admission: 10/30/17, date of discharge: 11/01/2017  Diagnosis: Urinary incontinence  She was discharged to home  Medications were not reviewed today  She scheduled a follow up appointment  Counseling was provided to the patient  Topics counseled included importance of compliance with treatment  Communication performed and completed by MELBA Wheeler   HPI: Patient presents for [facility] visit  She was hospitalized at [address] [place] [address] 10/30/17 - 11/1/17 for urinary incontinence  Upon admission there was suspicion for UTI and patient was started on Ancef IV  Infectious disease was consulted for recommendation regarding recurrent UTIs  Symptoms improved with IV antibiotic therapy  Per discussion between ID and nephrology it was recommended that patient be on chronic antibiotic prophylaxis given her history of renal transplant  Patient was recommended to start Bactrim regular strength 1 tablet every other day  She was advised to follow-up with nephrology as outpatient  She was seen in consultation by urology  Renal ultrasound showed transplant kidney, stable right sided bladder diverticulum containing calculi  Patient was advised to follow-up with urology as an outpatient  She was discharged home in stable condition with resolution of urinary incontinence      Reviewed hospital records, discharge medications, reconciled med list      Blood work done on November 2, 2017 showed potassium 3 3, creatinine 1 69, GFR 34  Hemoglobin 11 4  CKD stage 4- patient has appointment with nephrologist Dr Liza Velazco tomorrow  BP at home ranges 165-170/50 -60's  Denies CP, SOB  Hypothyroidism -currently on Levothyroxine 88 mcg daily  Type 1 DM - currently on Insulin therapy as per endocrinology  Blood sugar ranges 110- 120's  Follows a low carb diet  Lost 10 lb since last month  Will followup with endocrinologist next month  Hyperlipidemia - on Pravastatin 80 mg daily  Denies side effects from statin therapy  Review of Systems  Complete-Female:   Constitutional: feeling tired, but no fever and no chills  Lost 10 lb since last month  Eyes: wears glasses, but no eye pain, no dryness of the eyes, eyes not red, no purulent discharge from the eyes and no itching of the eyes  No visual disturbances  ENT: no earache, no nosebleeds, no sore throat, no hearing loss, no nasal discharge and no hoarseness  Cardiovascular: no chest pain, no intermittent leg claudication, no palpitations and no lower extremity edema  Respiratory: no shortness of breath and no wheezing    The patient presents with complaints of no cough  Gastrointestinal: constipation, but no abdominal pain, no nausea, no vomiting, no diarrhea and no blood in stools  No heartburn  Genitourinary: no dysuria, no pelvic pain and no incontinence  Musculoskeletal: no arthralgias, no joint swelling and no myalgias  Integumentary: no rashes, no itching and no skin wound  Neurological: tingling, numbness in legs, but no headache    The patient presents with complaints of no dizziness  Psychiatric: anxiety, sleep disturbances, depression and symptoms are stable, but not suicidal    Hematologic/Lymphatic: a tendency for easy bruising, but no swollen glands, no tendency for easy bleeding and no swollen glands in the neck  Active Problems    1  Abnormal EKG (794 31) (R94 31)   2  Acid reflux disease (530 81) (K21 9)   3  Anemia (285 9) (D64 9)   4  Antibiotic prophylaxis for dental procedure indicated due to prior joint replacement   (V58 62) (Z79 2)   5  Atrial fibrillation (427 31) (I48 91)   6  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)   7  Benign hypertensive CKD (403 10) (I12 9)   8  Bilateral carotid bruits (785 9) (R09 89)   9  Bilateral leg edema (782 3) (R60 0)   10  Bruit of left carotid artery (785 9) (R09 89)   11  Cataract (366 9) (H26 9)   12  Chronic constipation (564 00) (K59 09)   13  Chronic diastolic congestive heart failure (428 32,428 0) (I50 32)   14  Chronic kidney disease, stage 4 (severe) (585 4) (N18 4)   15  Cocaine abuse in remission (305 63) (F14 11)   16  Cognitive changes (799 59) (R41 89)   17  Colon cancer screening (V76 51) (Z12 11)   18  Constipation (564 00) (K59 00)   19  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, without long-term    current use of insulin (250 61) (E10 49)   20  Diabetes mellitus with diabetic nephropathy (250 40,583 81) (E11 21)   21  Diabetic Gastropathy (537 9)   22  Diabetic polyneuropathy (250 60,357 2) (E11 42)   23  Diabetic retinopathy (250 50,362 01) (E11 319)   24  Diabetic Ulcer Of The Left Foot (250 80)   25  Diastolic dysfunction (283 7) (I51 9)   26  ROD (dyspnea on exertion) (786 09) (R06 09)   27  Drug-induced Essential Tremor (333 1)   28  Encounter for screening mammogram for breast cancer (V76 12) (Z12 31)   29  Esophagitis, reflux (530 11) (K21 0)   30  External Hemorrhoids (455 3)   31  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)   32  Fatigue (780 79) (R53 83)   33  FELIPE (generalized anxiety disorder) (300 02) (F41 1)   34  H/O kidney transplant (V42 0) (Z94 0)   35  Heart palpitations (785 1) (R00 2)   36  Hospital discharge follow-up (V67 59) (Z09)   40  Hyperlipidemia (272 4) (E78 5)   38   Hyperplastic colon polyp (211 3) (K63 5) 39  Hypertension (401 9) (I10)   40  Hyponatremia (276 1) (E87 1)   41  Hypothyroidism (244 9) (E03 9)   42  Increased frequency of urination (788 41) (R35 0)   43  Indolent multiple myeloma (203 00) (C90 00)   44  Irritable bowel syndrome (564 1) (K58 9)   45  Major depressive disorder, recurrent episode, in full remission (296 36) (F33 42)   46  Memory loss (780 93) (R41 3)   47  Memory loss or impairment (780 93) (R41 3)   48  Metabolic acidosis (662 5) (E87 2)   49  MGUS (monoclonal gammopathy of unknown significance) (273 1) (D47 2)   50  Need for influenza vaccination (V04 81) (Z23)   51  Nonrheumatic aortic valve insufficiency (424 1) (I35 1)   52  OAB (overactive bladder) (596 51) (N32 81)   53  Osteopenia (733 90) (M85 80)   54  Osteoporosis (733 00) (M81 0)   55  Other calculus in bladder (594 1) (N21 0)   56  Other insomnia (780 52) (G47 09)   57  Peripheral vascular disease (443 9) (I73 9)   58  Post-traumatic stress disorder, chronic (309 81) (F43 12)   59  Preoperative cardiovascular examination (V72 81) (Z01 810)   60  Proteinuria (791 0) (R80 9)   61  Pyelonephritis (590 80) (N12)   62  Rectal polyp (569 0) (K62 1)   63  Recurrent UTI (599 0) (N39 0)   64  Restless legs syndrome (333 94) (G25 81)   65  Secondary hyperparathyroidism, renal (588 81) (N25 81)   66  Snoring (786 09) (R06 83)   67  SOB (shortness of breath) (786 05) (R06 02)   68  Status post amputation of right great toe (V49 71) (Z89 411)   69  Status post pancreas transplantation (V42 83) (Z94 83)   70  Status post transmetatarsal amputation of left foot (V49 73) (Z89 432)   71  Tremor (781 0) (R25 1)   72  Unsteady gait (781 2) (R26 81)   73  Weakness (780 79) (R53 1)    Past Medical History    1  History of Abnormal Liver Function Test (790 6)   2  History of Abnormal urine (791 9) (R82 90)   3  History of Abscess of buttock, right (682 5) (L02 31)   4  History of Acute kidney injury (584 9) (N17 9)   5   History of Acute kidney injury superimposed on CKD (866 00,585 9) (N17 9,N18 9)   6  History of Acute on chronic diastolic congestive heart failure (428 33,428 0) (I50 33)   7  History of Bilateral impacted cerumen (380 4) (H61 23)   8  History of Cervical Dysplasia (V13 22)   9  History of Denial Of Any Significant Medical History   10  History of Diabetes mellitus with foot ulcer (250 80,707 15) (E11 621,L97 509)   11  History of allergic urticaria (V13 3) (Z87 2)   12  History of cholelithiasis (V12 79) (Z87 19)   13  History of dysuria (V13 00) (Z87 898)   14  History of encephalopathy (V12 49) (Z86 69)   15  History of gastritis (V12 79) (Z87 19)   16  Denied: History of head injury   17  History of hematuria (V13 09) (Z87 448)   18  History of hyperkalemia (V12 29) (Z86 39)   19  History of pneumonia (V12 61) (Z87 01)   20  History of seborrhea (V13 3) (Z87 2)   21  History of urinary tract infection (V13 02) (Z87 440)   22  History of urinary tract infection (V13 02) (Z87 440)   23  History of Iliotibial band syndrome (728 89) (M76 30)   24  History of Infected tooth (522 4) (K04 7)   25  History of Lumbar radiculopathy (724 4) (M54 16)   26  History of Neck pain (723 1) (M54 2)   27  History of Nonrheumatic aortic valve insufficiency (424 1) (I35 1)   28  Denied: History of Seizures   29  History of Sinus Tachycardia (427 89)   30  History of Trochanteric bursitis, unspecified laterality (726 5) (M70 60)   31  History of Urinary Tract Infection (V13 02)   32  History of UTI (urinary tract infection), bacterial (599 0,041 9) (N39 0,A49 9)   33  History of Vaginal itching (698 1) (L29 8)    Surgical History    1  History of Cataract Surgery   2  History of Cholecystectomy   3  History of Complete Colonoscopy   4  History of Complete Colonoscopy   5  History of Diagnostic Esophagogastroduodenoscopy   6  History of Diagnostic Esophagogastroduodenoscopy   7  History of Dilation And Curettage   8  History of Foot Surgery   9   History of Pancreatic Transplantation   10  Renal Transplant   11  History of Surgery Left Foot Amputation  Surgical History Reviewed: The surgical history was reviewed and updated today  Family History  Mother    1  No family history of mental disorder  Father    2  Family history of cancer (V16 9) (Z80 9)   3  Family history of hypertension (V17 49) (Z82 49)   4  No family history of mental disorder  Sister    5  Family history of depression (V17 0) (Z81 8)  Grandparent    6  Family history of cancer (V16 9) (Z80 9)  Maternal Grandmother    7  Family history of Breast Cancer (V16 3)  Family History Reviewed: The family history was reviewed and updated today  Social History    · Denied: History of Alcohol Use (History)   · Former smoker (V15 82) (D37 827)   · History of Uses cocaine  Social History Reviewed: The social history was reviewed and updated today  Current Meds   1  Acetaminophen 325 MG Oral Tablet; TAKE 1 TO 2 TABLETS EVERY 6 HOURS AS   NEEDED Recorded   2  AmLODIPine Besylate 10 MG Oral Tablet; TAKE 1 TABLET BY MOUTH EVERY MORNING   AND 1/2 TABLET BY MOUTH EVERY EVENING; Therapy: 92THS0720 to (Patrick Ocasio)  Requested for: 11Aug2017; Last   Rx:11Aug2017 Ordered   3  ARIPiprazole 30 MG Oral Tablet; TAKE 1 TABLET AT BEDTIME; Therapy: 62ZHK3504 to (Evaluate:23Apr2018)  Requested for: 25Oct2017; Last   Rx:25Oct2017 Ordered   4  Aspirin 81 MG TABS; TAKE 1 TABLET DAILY; Therapy: 61JYA6760 to (Evaluate:19Jun2016) Recorded   5  BD Pen Needle Mary U/F 32G X 4 MM Miscellaneous; Use 4x daily; Therapy: 05MYV9255 to (Evaluate:11Aug2018)  Requested for: 61NFB0712; Last   Rx:71Pvw4084 Ordered   6  Catapres 0 1 MG Oral Tablet; take 3 tab  in am, 2 tab at noon, 3 tab  at night; Therapy: 56NXK7518 to (Last Rx:05Qah7768)  Requested for: 64Oce0225 Ordered   7  Doxazosin Mesylate 1 MG Oral Tablet; TAKE 1 TABLET Bedtime  Requested for:   92CVG9865; Last Rx:05Apr2017 Ordered   8   DULoxetine HCl - 60 MG Oral Capsule Delayed Release Particles; TAKE 1 CAPSULE   TWICE DAILY; Therapy: 27IYB7736 to (Jimenez Brain)  Requested for: 25Oct2017; Last   RM:91CID9067 Ordered   9  Folic Acid 1 MG Oral Tablet; TAKE 1 TABLET DAILY AS DIRECTED; Therapy: 63XTW2187 to (Evaluate:14Oct2018)  Requested for: 21YLZ8308; Last   Rx:19Oct2017 Ordered   10  Furosemide 20 MG Oral Tablet; TAKE 1 TABLET DAILY; Therapy: 74Nwb9573 to (Evaluate:58Vqb0548) Recorded   11  HumaLOG KwikPen 100 UNIT/ML Subcutaneous Solution Pen-injector; Inject 5 units 3    times daily with meals; Therapy: 78HWE9298 to (Evaluate:75Lml0353)  Requested for: 11Wxa8279 Recorded   12  HydrALAZINE HCl - 50 MG Oral Tablet; TAKE 1 TABLET 3 TIMES DAILY; Therapy: 93SFC4796 to (Corky Covert)  Requested for: 65RYE1274; Last    Rx:13Mar2017 Ordered   13  Lactulose 10 GM/15ML Oral Solution; TAKE 30 ML DAILY; Therapy: 76VBD4653 to (Last Rx:02Jun2017)  Requested for: 02Jun2017 Ordered   14  Levothyroxine Sodium 88 MCG Oral Tablet; take 1 tablet by mouth daily; Therapy: 19NXC6459 to (Evaluate:31Sdm9285)  Requested for: 36CSV1820 Recorded   15  LORazepam 2 MG Oral Tablet; Take 1 tablet 3 times daily as needed; Therapy: 60NED1514 to (Evaluate:05Jun2018)  Requested for: 25Oct2017; Last    SF:77XCT0869 Ordered   16  Metoprolol Tartrate 50 MG Oral Tablet; take 1 tablet by mouth twice daily; Therapy: 56GLC7016 to (Evaluate:85Sms8249)  Requested for: 27Jun2017; Last    Rx:27Jun2017 Ordered   17  OneTouch Ultra Blue In [employer]; Testing 5 times daily as directed by physician; Therapy: 04HWC8190 to (Evaluate:93Qzc4955)  Requested for: 26Oct2017; Last    Rx:25Oct2017 Ordered   18  OneTouch UltraSoft Lancets Miscellaneous; test twice daily; Therapy: 67LSG5446 to (Evaluate:84Gbt5810)  Requested for: 39Zqa7384 Recorded   19  Pantoprazole Sodium 40 MG Oral Tablet Delayed Release; take 1 tablet by mouth every    day;     Therapy: 64TLA3948 to (Evaluate:30Cvr5473)  Requested for: 50SGK6558; Last    Rx:07Str1139 Ordered   20  Pravastatin Sodium 80 MG Oral Tablet; take 1 tablet by mouth every day; Therapy: 56TKK8636 to (Evaluate:01Dtz9494)  Requested for: 69DPL8125; Last    Rx:76Wus2171 Ordered   21  PredniSONE 5 MG Oral Tablet; TAKE 1 TABLET DAILY; Therapy: 01KVY6163 to (Evaluate:98Seh2637)  Requested for: 71RCG6798; Last    Rx:35Bvp8026 Ordered   22  ROPINIRole HCl - 0 25 MG Oral Tablet; take 1 tablet by mouth twice daily; Therapy: 17LCL0779 to (Sammy Arevalo)  Requested for: 57Opa6787; Last    Rx:53Xsd1891 Ordered   23  Sertraline HCl - 100 MG Oral Tablet; TAKE 2 TABLETS DAILY; Therapy: 42LGP3270 to (Mauricio Rm)  Requested for: 25Oct2017; Last    NF:18FWC5062 Ordered   24  Sodium Bicarbonate 650 MG Oral Tablet; Take one tablet daily; Therapy: 62MSZ0061 to (Evaluate:12Apr2016)  Requested for: 16ZJE4151 Recorded   25  Tacrolimus 1 MG Oral Capsule; Take 4 in the morning and 3 in the evening  Requested    for: 20Tff3827; Last Rx:96Bva0743 Ordered   26  Toujeo SoloStar 300 UNIT/ML Subcutaneous Solution Pen-injector; INJECT 20 UNIT    Bedtime; Therapy: 01PPT8873 to (Evaluate:78Hfu3888)  Requested for: 95Juq1666 Recorded   27  TraZODone HCl - 150 MG Oral Tablet; TAKE 1 TABLET AT BEDTIME; Therapy: 02GBH0667 to (Mauricio Rm)  Requested for: 25Oct2017; Last    OS:67YII6503 Ordered   28  Vitamin D3 1000 UNIT Oral Capsule; TAKE 3 PILLS AT NOON BY MOUTH; Therapy: (Recorded:17Jun2016) to Recorded  Medication List Reviewed: The medication list was reviewed and updated today  Allergies    1  Penicillins   2  Morphine Derivatives   3  Duricef TABS   4   Myrbetriq TB24    Vitals  Signs   Recorded: 23BNG1797 45:22KJ   Systolic: 988  Diastolic: 64  Recorded: 05DCY2718 08:44AM   Temperature: 97 6 F, Tympanic  Heart Rate: 68, L Radial  Respiration: 16  Systolic: 849, LUE  Diastolic: 72, LUE  Height: 5 ft 7 5 in  Weight: 218 lb 12 8 oz  BMI Calculated: 33 76  BSA Calculated: 2 11  Pain Scale: 0    Physical Exam    Constitutional   General appearance: No acute distress, well appearing and well nourished  Obese  Eyes   Conjunctiva and lids: No swelling, erythema or discharge  Pupils and irises: Equal, round and reactive to light  Ears, Nose, Mouth, and Throat   External inspection of ears and nose: Normal     Otoscopic examination: Tympanic membranes translucent with normal light reflex  Canals patent without erythema  Oropharynx: Normal with no erythema, edema, exudate or lesions  Pulmonary   Respiratory effort: No increased work of breathing or signs of respiratory distress  Auscultation of lungs: Clear to auscultation  Cardiovascular   Auscultation of heart: Normal rate and rhythm, normal S1 and S2, without murmurs  Examination of extremities for edema and/or varicosities: Normal     Abdomen   Abdomen: Non-tender, no masses  Liver and spleen: No hepatomegaly or splenomegaly  Musculoskeletal Unsteady gait  Ambulates with a cane  Digits and nails: Normal without clubbing or cyanosis  Inspection/palpation of joints, bones, and muscles: Abnormal   R foot: amputated 1 st toe  L foot: amputated all 5 toes  Skin   Skin and subcutaneous tissue: Normal without rashes or lesions  Neurologic BL hand tremors  Psychiatric   Mood and affect: Normal          Future Appointments    Date/Time Provider Specialty Site   02/16/2018 10:00 AM EDGAR Calderon  [address] [place] [address]   12/01/2017 09:15 AM Sherman Carvajal RD Diabetes Educator [facility] ENDOCRINOLOGY   12/04/2017 08:45 AM EDGAR Daley  Hematology Oncology [facility] [place]   01/25/2018 02:00 PM EDGAR Aranda  Psychiatry [facility]   11/08/2017 09:00 AM EDGAR Clifford   [facility]phrology  [facility]   12/06/2017 09:15 AM Greg Corey Endocrinology [facility] ENDOCRINOLOGY     Signatures   Electronically signed by : EDGAR Saha ; Nov 7 2017  8:51PM EST                       (Author)

## 2018-01-11 NOTE — MISCELLANEOUS
Message   Recorded as Task   Date: 11/06/2016 09:07 AM, Created By: Eduardo Hicks   Task Name: Miscellaneous   Assigned To: Jerald Howell   Regarding Patient: Carmelo Lemons, Status: In Progress   Ame December - 06 Nov 2016 9:07 AM     TASK CREATED  based on BP log sent in    Nayeli Tyler Sole no changes in meds, resend BP's in 3 weeks  Does she have f/u stacey't set? Ana Maria Stark - 07 Nov 2016 1:39 PM     TASK IN PROGRESS   I spoke with Chidi Hewitt and informed her that based on recent BP log that was sent in there will be no changes to BP meds  A f/u has been scheduled for 11/11  [facility] 11/7/2016      Active Problems    1  Abnormal EKG (794 31) (R94 31)   2  Acid reflux disease (530 81) (K21 9)   3  Allergic urticaria (708 0) (L50 0)   4  Anemia (285 9) (D64 9)   5  Antibiotic prophylaxis for dental procedure indicated due to prior joint replacement   (V58 62) (Z79 2)   6  Atrial fibrillation (427 31) (I48 91)   7  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)   8  Benign hypertensive CKD (403 10) (I12 9)   9  Bilateral leg edema (782 3) (R60 0)   10  Bruit of left carotid artery (785 9) (R09 89)   11  Cataract (366 9) (H26 9)   12  Chronic diastolic congestive heart failure (428 32,428 0) (I50 32)   13  Chronic kidney disease, stage 3 (585 3) (N18 3)   14  Cocaine abuse in remission (305 63) (F14 10)   15  Cognitive changes (799 59) (R41 89)   16  Constipation (564 00) (K59 00)   17  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, without long-term    current use of insulin (250 61) (E10 49)   18  Depression with anxiety (300 4) (F41 8)   19  Diabetes mellitus with nephropathy (250 40,583 81) (E11 21)   20  Diabetic Gastropathy (537 9)   21  Diabetic polyneuropathy (250 60,357 2) (E11 42)   22  Diabetic retinopathy (250 50,362 01) (E11 319)   23  Diabetic Ulcer Of The Left Foot (250 80)   24  ROD (dyspnea on exertion) (786 09) (R06 09)   25  Drug-induced Essential Tremor (333 1)   26   Dysuria (788 1) (R30 0)   27  Encephalopathy (348 30) (G93 40)   28  Encounter for screening mammogram for breast cancer (V76 12) (Z12 31)   29  Esophagitis, reflux (530 11) (K21 0)   30  External Hemorrhoids (455 3)   31  FELIPE (generalized anxiety disorder) (300 02) (F41 1)   32  H/O kidney transplant (V42 0) (Z94 0)   33  Heart palpitations (785 1) (R00 2)   34  Hospital discharge follow-up (V67 59) (Z09)   35  Hyperlipidemia (272 4) (E78 5)   36  Hyperplastic colon polyp (211 3) (K63 5)   37  Hypertension (401 9) (I10)   38  Hypothyroidism (244 9) (E03 9)   39  Increased frequency of urination (788 41) (R35 0)   40  Increased white blood cell count (288 60) (D72 829)   41  Insomnia (780 52) (G47 00)   42  Irritable bowel syndrome (564 1) (K58 9)   43  Major depressive disorder, recurrent episode, in full remission (296 36) (F33 42)   44  Memory loss (780 93) (R41 3)   45  Memory loss or impairment (780 93) (R41 3)   46  Metabolic acidosis (899 8) (E87 2)   47  MGUS (monoclonal gammopathy of unknown significance) (273 1) (D47 2)   48  Neck pain (723 1) (M54 2)   49  Need for influenza vaccination (V04 81) (Z23)   50  Nonrheumatic aortic valve insufficiency (424 1) (I35 1)   51  OAB (overactive bladder) (596 51) (N32 81)   52  Osteopenia (733 90) (M85 80)   53  Osteoporosis (733 00) (M81 0)   54  Other calculus in bladder (594 1) (N21 0)   55  Peripheral vascular disease (443 9) (I73 9)   56  Post traumatic stress disorder (309 81) (F43 10)   57  Preop general physical exam (V72 83) (Z01 818)   58  Preoperative cardiovascular examination (V72 81) (Z01 810)   59  Proteinuria (791 0) (R80 9)   60  Rectal polyp (569 0) (K62 1)   61  Recurrent UTI (599 0) (N39 0)   62  Restless legs syndrome (333 94) (G25 81)   63  Secondary hyperparathyroidism, renal (588 81) (N25 81)   64  Sinus Tachycardia (427 89)   65  SOB (shortness of breath) (786 05) (R06 02)   66  Tremor (781 0) (R25 1)   67  Unsteady gait (781 2) (R26 81)    Current Meds   1  AmLODIPine Besylate 10 MG Oral Tablet; take 1/2 tab  BID; Therapy: 54JJK9897 to (Evaluate:30Apr2017)  Requested for: 59PMR4495 Recorded   2  ARIPiprazole 20 MG Oral Tablet; TAKE 1 TABLET AT BEDTIME; Therapy: 59JYE9326 to (Evaluate:71Csa7135)  Requested for: 76LIW5622; Last   Rx:84Okg7963 Ordered   3  Aspirin 81 MG Oral Tablet Chewable; Therapy: (Recorded:07Oct2016) to Recorded   4  Aspirin 81 MG TABS; TAKE 1 TABLET DAILY; Therapy: 44JHH6464 to (Evaluate:19Jun2016) Recorded   5  Caltrate 600 TABS; TAKE 1 TABLET TWICE DAILY; Therapy: (Recorded:17Jun2016) to Recorded   6  Catapres 0 1 MG Oral Tablet; take 3 tab  in am, 2 tab at noon, 3 tab  at night; Therapy: 44KXE8935 to  Requested for: 67IFE0398 Recorded   7  Clindamycin HCl - 300 MG Oral Capsule; TAKE 2 CAPSULES 1 HOUR PRIOR TO   DENTAL APPOINTMENT; Therapy: 26XVL4909 to (Evaluate:21Oct2016)  Requested for: 87LDS6810; Last   Rx:20Oct2016 Ordered   8  Doxazosin Mesylate 1 MG Oral Tablet; TAKE 1 TABLET AT BEDTIME; Therapy: (Recorded:17Jun2016) to Recorded   9  DULoxetine HCl - 60 MG Oral Capsule Delayed Release Particles; TAKE 1 CAPSULE   TWICE DAILY; Therapy: 67AWA9587 to (Evaluate:09Aug2016)  Requested for: 28MYK5381; Last   Rx:64Lfd0477 Ordered   10  Folic Acid 1 MG Oral Tablet; TAKE 1 TABLET DAILY AS DIRECTED; Therapy: 20KBT5874 to (Evaluate:07Oct2017)  Requested for: 02JVZ6508; Last    Rx:12Oct2016 Ordered   11  Furosemide 40 MG Oral Tablet; TAKE 1 TO 2 TABLETS DAILY AS NEEDED FOR    EDEMA; Therapy: 86XPB4710 to (Evaluate:25Jun2017)  Requested for: 30NFM6762; Last    Rx:30Jun2016 Ordered   12  HydrALAZINE HCl - 50 MG Oral Tablet; TAKE 1 TABLET 3 TIMES DAILY; Therapy: 02COM7569 to (Evaluate:26Jan2017)  Requested for: 94LEV7652; Last    Rx:01Feb2016 Ordered   13  Hydrocodone-Acetaminophen 5-325 MG Oral Tablet; Therapy: (Recorded:07Oct2016) to Recorded   14  Keflex 500 MG Oral Capsule; Therapy: (Recorded:07Oct2016) to Recorded   15  Lactulose 10 GM/15ML Oral Solution; TAKE 30 ML DAILY; Therapy: (Recorded:17Jun2016) to Recorded   16  Levothyroxine Sodium 88 MCG Oral Tablet; take 1 tablet by mouth daily; Therapy: 52XGK5712 to (Lexie Braxton)  Requested for: 20Klo7061; Last    Rx:07Sep2016 Ordered   17  LORazepam 2 MG Oral Tablet; TAKE 1 TABLET TWICE DAILY AS NEEDED; Therapy: 87NTI3351 to (Evaluate:15Jan2017)  Requested for: 75IYJ9595; Last    Rx:17Oct2016; Status: ACTIVE - Renewal Denied Ordered   18  Magnesium 200 MG Oral Tablet; Therapy: 31DNQ2901 to Recorded   19  Magnesium TABS; Therapy: (Recorded:07Oct2016) to Recorded   20  Metoprolol Tartrate 50 MG Oral Tablet; take 1 tablet by mouth twice daily; Therapy: 87JBJ6517 to (Evaluate:09Jun2017)  Requested for: 63DXM0799; Last    Rx:14Jun2016 Ordered   21  OneTouch Ultra Blue In [employer]; TEST TWICE A DAY; Therapy: 42BIU1605 to (Evaluate:08Jul2016)  Requested for: 99CSE5919; Last    Rx:10Mar2016 Ordered   22  OneTouch UltraSoft Lancets Miscellaneous; test twice daily; Therapy: 78ZLW9654 to (Anita Torres)  Requested for: 34SGD5763; Last    Rx:09Mar2016 Ordered   23  Pantoprazole Sodium 40 MG Oral Tablet Delayed Release; take 1 tablet by mouth every    day; Therapy: 84PRG2042 to (Evaluate:60Dgc1114)  Requested for: 35QZF5250; Last    Rx:57Xoc9896 Ordered   24  Pravastatin Sodium 80 MG Oral Tablet; take 1 tablet by mouth every day; Therapy: 97UHE8709 to (Marleny Escobedo)  Requested for: 66OJV1339; Last    OX:89FRD5006 Ordered   25  PredniSONE 5 MG Oral Tablet; TAKE ONE AND 1/2 TABLETS BY MOUTH DAILY; Therapy: 35RYG9206 to (Carina Sheehan)  Requested for: 75CHG2632; Last    Rx:42Azs9090 Ordered   26  Prograf 1 MG Oral Capsule; Take 4 in the morning and 3 in the evening; Therapy: (Recorded:94Vrw7962) to Recorded   27  ROPINIRole HCl - 0 25 MG Oral Tablet; take 1 tablet by mouth twice daily;     Therapy: 61NKG8057 to (Evaluate:14Klh3995) Requested for: 90CRU2441; Last    Rx:30Jun2016 Ordered   28  Sertraline HCl - 100 MG Oral Tablet; TAKE 2 TABLETS DAILY; Therapy: 35RIG9039 to (Evaluate:15Jan2017)  Requested for: 97VNW6712; Last    Rx:17Oct2016 Ordered   29  Sodium Bicarbonate 650 MG Oral Tablet; Take one tablet daily; Therapy: 49GRF2727 to (Evaluate:12Apr2016)  Requested for: 03LOT8712 Recorded   30  TraZODone HCl - 150 MG Oral Tablet; TAKE 1 TABLET AT BEDTIME; Therapy: 54SLY3890 to ([facility])  Requested for: 55Cah2792; Last    Rx:34Plt2378 Ordered   31  Vitamin D3 1000 UNIT Oral Capsule; TAKE 3 PILLS AT NOON BY MOUTH; Therapy: (Recorded:17Jun2016) to Recorded    Allergies    1  Penicillins   2  Morphine Derivatives   3  Duricef TABS   4   Myrbetriq CH08    Signatures   Electronically signed by : EDGAR Prater Ala ; Nov 7 2016  1:44PM EST                       (Author)

## 2018-01-11 NOTE — RESULT NOTES
Message   can patient have appt with me in at next available (pt missed this past week)      can patient be notified that tac level is appropriate, Cr is 1 9, stable, bicarb is lower      - pt is on sodium bicarb tabs one tab once a day, can she be advised to increase to 1 tab twice daily      (above task sent)        Verified Results  (1) URINALYSIS (will reflex a microscopy if leukocytes, occult blood, protein or nitrites are not within normal limits) 10GWA0804 06:40AM [facility]     Test Name Result Flag Reference   COLOR Yellow     CLARITY Cloudy     SPECIFIC GRAVITY UA 1 014  1 003-1 030   PH UA 7 0  4 5-8 0   LEUKOCYTE ESTERASE UA Large A Negative   NITRITE UA Negative  Negative   PROTEIN UA 30 (1+) mg/dl A Negative   GLUCOSE UA Negative mg/dl  Negative   KETONES UA Negative mg/dl  Negative   UROBILINOGEN UA 0 2 E U /dl  0 2, 1 0 E U /dl   BILIRUBIN UA Negative  Negative   BLOOD UA Negative  Negative     (1) URINALYSIS (will reflex a microscopy if leukocytes, occult blood, protein or nitrites are not within normal limits) 19GOP6749 06:40AM [facility]     Test Name Result Flag Reference   BACTERIA Occasional /hpf  None Seen, Occasional   EPITHELIAL CELLS None Seen /hpf  None Seen, Occasional   HYALINE CASTS 5-10 /lpf A None Seen   RBC UA None Seen /hpf  None Seen, 0-5   WBC UA None Seen /hpf  None Seen, 0-5, 5-55, 5-65     (1) CBC/ PLT (NO DIFF) 81RQQ6527 06:40AM [facility]     Test Name Result Flag Reference   HEMATOCRIT 36 2 %  34 8-46 1   HEMOGLOBIN 11 4 g/dL L 11 5-15 4   MCHC 31 5 g/dL  31 4-37 4   MCH 28 6 pg  26 8-34 3   MCV 91 fL  82-98   PLATELET COUNT 293 Thousands/uL  149-390   RBC COUNT 3 98 Million/uL  3 81-5 12   RDW 16 9 % H 11 6-15 1   WBC COUNT 15 73 Thousand/uL H 4 31-10 16   MPV 13 0 fL H 8 9-12 7     (1) BASIC METABOLIC PROFILE 83XOR8796 06:40AM [facility]     Test Name Result Flag Reference   SODIUM 137 mmol/L  136-145   POTASSIUM 3 6 mmol/L  3 5-5 3 CHLORIDE 109 mmol/L H 100-108   CARBON DIOXIDE 19 mmol/L L 21-32   ANION GAP (CALC) 9 mmol/L  4-13   BLOOD UREA NITROGEN 46 mg/dL H 5-25   CREATININE 1 92 mg/dL H 0 60-1 30   Standardized to IDMS reference method   CALCIUM 9 2 mg/dL  8 3-10 1   eGFR 29 ml/min/1 73sq m     National Kidney Disease Education Program recommendations are as follows:  GFR calculation is accurate only with a steady state creatinine  Chronic Kidney disease less than 60 ml/min/1 73 sq  meters  Kidney failure less than 15 ml/min/1 73 sq  meters  GLUCOSE FASTING 133 mg/dL H 65-99   Specimen collection should occur prior to Sulfasalazine administration due to the potential for falsely depressed results  Specimen collection should occur prior to Sulfapyridine administration due to the potential for falsely elevated results  (1) URINE PROTEIN CREATININE RATIO 79SGD0038 06:40AM [facility]     Test Name Result Flag Reference   CREATININE URINE 69 5 mg/dL     URINE PROTEIN:CREATININE RATIO 1 09 H 0 00-0 10   URINE PROTEIN 76 mg/dL       (1)  TACROLIMUS 00ONG3802 06:40AM [facility]     Test Name Result Flag Reference    5 8 ng/mL  2 0 - 20 0   Trough (immediately following                  transplant)                       15 0          Trough (steady state, 2 weeks or                  more after transplant):      3 0 - 8 0                                   Detection Limit = 1 0          Performed by LC-MS/MS technology  Performed at:  [address]  [id number]  : Yasmine Garcia MD, Phone:  [phone]       Plan  Benign hypertensive CKD    · Catapres 0 1 MG Oral Tablet (CloNIDine HCl); take 3 tab   in am, 2 tab at noon, 3  tab  at night

## 2018-01-11 NOTE — RESULT NOTES
Message   labs reviewed  Cr slightly higher  tac not yet returned  UA with possible UTI  patient asymptomatic  i spoke with patient tonight  will repeat UA tomorrow  if tac is normal (should be returned in 1-2 days) or patient develops urinary symptoms then will treat as Cr slightly higher  UPCR improving  awaiting heme appt  will have low threshold to treat for UTI  Patient understands plan  Verified Results  (1) CBC/ PLT (NO DIFF) 40Okl7151 06:31AM [facility]     Test Name Result Flag Reference   HEMATOCRIT 35 6 %  34 8-46 1   HEMOGLOBIN 11 4 g/dL L 11 5-15 4   MCHC 32 0 g/dL  31 4-37 4   MCH 30 1 pg  26 8-34 3   MCV 94 fL  82-98   PLATELET COUNT 317 Thousands/uL  149-390   RBC COUNT 3 79 Million/uL L 3 81-5 12   RDW 18 1 % H 11 6-15 1   WBC COUNT 8 52 Thousand/uL  4 31-10 16   MPV 13 1 fL H 8 9-12 7     (1) BASIC METABOLIC PROFILE 02GQR2208 06:31AM [facility]     Test Name Result Flag Reference   SODIUM 138 mmol/L  136-145   POTASSIUM 4 1 mmol/L  3 5-5 3   CHLORIDE 107 mmol/L  100-108   CARBON DIOXIDE 22 mmol/L  21-32   ANION GAP (CALC) 9 mmol/L  4-13   BLOOD UREA NITROGEN 32 mg/dL H 5-25   CREATININE 1 78 mg/dL H 0 60-1 30   Standardized to IDMS reference method   CALCIUM 9 1 mg/dL  8 3-10 1   eGFR 32 ml/min/1 73sq m     National Kidney Disease Education Program recommendations are as follows:  GFR calculation is accurate only with a steady state creatinine  Chronic Kidney disease less than 60 ml/min/1 73 sq  meters  Kidney failure less than 15 ml/min/1 73 sq  meters  GLUCOSE FASTING 88 mg/dL  65-99   Specimen collection should occur prior to Sulfasalazine administration due to the potential for falsely depressed results  Specimen collection should occur prior to Sulfapyridine administration due to the potential for falsely elevated results       (1) URINE PROTEIN CREATININE RATIO 25Avu3082 06:31AM [facility]     Test Name Result Flag Reference   CREATININE URINE 61 6 mg/dL     URINE PROTEIN:CREATININE RATIO 1 15 H 0 00-0 10   URINE PROTEIN 71 mg/dL       (1) URINALYSIS (will reflex a microscopy if leukocytes, occult blood, protein or nitrites are not within normal limits) 09Pbb4127 06:31AM [facility]     Test Name Result Flag Reference   COLOR Yellow     CLARITY Cloudy     SPECIFIC GRAVITY UA 1 012  1 003-1 030   PH UA 7 5  4 5-8 0   LEUKOCYTE ESTERASE UA Large A Negative   NITRITE UA Positive A Negative   PROTEIN UA 30 (1+) mg/dl A Negative   GLUCOSE UA Negative mg/dl  Negative   KETONES UA Negative mg/dl  Negative   UROBILINOGEN UA 0 2 E U /dl  0 2, 1 0 E U /dl   BILIRUBIN UA Negative  Negative   BLOOD UA Negative  Negative     (1) URINALYSIS (will reflex a microscopy if leukocytes, occult blood, protein or nitrites are not within normal limits) 04Oju7225 06:31AM [facility]     Test Name Result Flag Reference   BACTERIA Innumerable /hpf A None Seen, Occasional   EPITHELIAL CELLS None Seen /hpf  None Seen, Occasional   HYALINE CASTS 5-10 /lpf A None Seen   RBC UA None Seen /hpf  None Seen   WBC UA Innumerable /hpf A None Seen

## 2018-01-11 NOTE — MISCELLANEOUS
Message   Recorded as Task   Date: 01/27/2016 05:25 PM, Created By: Nargis Gil   Task Name: Miscellaneous   Assigned To: Cheryl Blackmon   Regarding Patient: Alisa Islas, Status: In Progress   Comment:    Sundeep Boyle - 27 Jan 2016 5:25 PM     TASK CREATED  have MJ weigh herself every day and call w weights q Mon x3  BMP in 1 week re: slight increase in creat   Cheryl Blackmon - 28 Jan 2016 10:14 AM     TASK IN PROGRESS   Cheryl Blackmon - 28 Jan 2016 10:14 AM     TASK EDITED  I left a message for the patient to call the office  I spoke with the patient and she is aware of the above  I sent her the lab slip for the repeat BMP next week  kja      Active Problems    1  Abnormal EKG (794 31) (R94 31)   2  Acute on chronic diastolic congestive heart failure (428 33,428 0) (I50 33)   3  Anemia (285 9) (D64 9)   4  Atrial fibrillation (427 31) (I48 91)   5  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)   6  Benign hypertensive CKD (403 10) (I12 9)   7  Bilateral leg edema (782 3) (R60 0)   8  Cataract (366 9) (H26 9)   9  Chronic kidney disease, stage 3 (585 3) (N18 3)   10  Cocaine abuse in remission (305 63) (F14 10)   11  Cognitive changes (799 59) (R41 89)   12  Constipation (564 00) (K59 00)   13  Diabetic Gastropathy (537 9)   14  Diabetic Nephropathy (583 81)   15  Diabetic polyneuropathy (250 60,357 2) (E11 42)   16  Diabetic retinopathy (250 50,362 01) (E11 319)   17  Diabetic Ulcer Of The Left Foot (250 80)   18  ROD (dyspnea on exertion) (786 09) (R06 09)   19  Drug-induced Essential Tremor (333 1)   20  Encounter for screening mammogram for breast cancer (V76 12) (Z12 31)   21  Esophageal reflux (530 81) (K21 9)   22  Esophagitis, reflux (530 11) (K21 0)   23  External Hemorrhoids (455 3)   24  FELIPE (generalized anxiety disorder) (300 02) (F41 1)   25  H/O kidney transplant (V42 0) (Z94 0)   26  Heart palpitations (785 1) (R00 2)   27  Hyperlipidemia (272 4) (E78 5)   28  Hyperplastic colon polyp (211 3) (K63 5)   29  Hypertension (401 9) (I10)   30  Hypothyroidism (244 9) (E03 9)   31  Increased white blood cell count (288 60) (D72 829)   32  Irritable bowel syndrome (564 1) (K58 9)   33  Major depressive disorder, recurrent episode, 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) (F33 41)   34  Memory loss (780 93) (R41 3)   35  Metabolic acidosis (144 4) (E87 2)   36  MGUS (monoclonal gammopathy of unknown significance) (273 1) (D47 2)   37  Neck pain (723 1) (M54 2)   38  Nonrheumatic aortic valve insufficiency (424 1) (I35 1)   39  Osteopenia (733 90) (M85 80)   40  Osteoporosis (733 00) (M81 0)   41  Peripheral vascular disease (443 9) (I73 9)   42  Post traumatic stress disorder (309 81) (F43 10)   43  Proteinuria (791 0) (R80 9)   44  Rectal polyp (569 0) (K62 1)   45  Restless legs syndrome (333 94) (G25 81)   46  Secondary hyperparathyroidism, renal (588 81) (N25 81)   47  Sinus Tachycardia (427 89)   48  SOB (shortness of breath) (786 05) (R06 02)   49  Tremor (781 0) (R25 1)   50  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 (012 20) (O45 10)    Current Meds   1  Abilify 20 MG Oral Tablet (ARIPiprazole); TAKE 1 TABLET AT BEDTIME; Therapy: 24VSA6346 to (Adolfo Most)  Requested for: 11Let9922; Last   Rx:30Wox4067 Ordered   2  AmLODIPine Besylate 10 MG Oral Tablet; take 1/2 tab  BID; Therapy: 18HQH3978 to (Evaluate:95Axl2754)  Requested for: 90Jnu2025 Recorded   3  Aspirin 81 MG Oral Tablet; TAKE 1 TABLET DAILY; Therapy: 45LMK7753 to (Evaluate:19Jun2016) Recorded   4  Carafate 1 GM/10ML Oral Suspension; Therapy: ([id number]) to Recorded   5  Citracal TABS; Take one tablet twice daily Recorded   6  Clindamycin HCl - 300 MG Oral Capsule; TAKE 2 CAPSULES 1 HOUR PRIOR TO   DENTAL APPOINTMENT; Therapy: 30Apr2015 to (Evaluate:25Zek8083)  Requested for: 03FFM3500; Last   DJ:13QTQ1660 Ordered   7  CloNIDine HCl - 0 1 MG Oral Tablet;  Take three tablets every morning, two tablets every   noon, and three tablets every evening; Therapy: 64HRE6735 to (Evaluate:14Mar2016)  Requested for: 20Mar2015; Last   Rx:20Mar2015 Ordered   8  DULoxetine HCl - 60 MG Oral Capsule Delayed Release Particles (Cymbalta); TAKE 1   CAPSULE TWICE DAILY; Therapy: 40WUI4913 to (Destini Trevino)  Requested for: 56RST1051; Last   Rx:45Eea7238; Status: ACTIVE - Renewal Denied Ordered   9  Folic Acid 1 MG Oral Tablet; TAKE 1 TABLET DAILY AS DIRECTED; Therapy: 25BBN9658 to (Evaluate:96Uqj3573)  Requested for: 79AXC8419; Last   Rx:12Eqe8268 Ordered   10  Furosemide 40 MG Oral Tablet; Take 1 tablet twice daily; Therapy: 37EMP4635 to (Evaluate:11Jun2016)  Requested for: 34OFQ0467 Recorded   11  Generlac 10 GM/15ML SYRP Recorded   12  HydrALAZINE HCl - 50 MG Oral Tablet; TAKE 1 TABLET 3 TIMES DAILY; Therapy: 36PTY5063 to (Evaluate:16Jan2017)  Requested for: 89XPD4282 Recorded   13  Hydrocodone-Acetaminophen 5-325 MG Oral Tablet; TAKE 1 TABLET EVERY 6 HOURS    AS NEEDED FOR PAIN;    Therapy: 59XYQ3095 to (Evaluate:07Jan2016); Last Rx:57Rbl4480 Ordered   14  Levothyroxine Sodium 75 MCG Oral Tablet; take 1 tablet by mouth every day; Therapy: 96MBT7520 to (Evaluate:46Fkm2315)  Requested for: 24RSQ1024; Last    Rx:43Nem0879 Ordered   15  LORazepam 2 MG Oral Tablet; TAKE 1 TABLET TWICE DAILY AS NEEDED; Therapy: 93QRH9694 to (Evaluate:19Jun2016) Recorded   16  Magnesium  (64 Mg) MG TBCR; TAKE 1 TABLET DAILY; Therapy: 67TIK8863 to (Evaluate:65Maf6586); Last Rx:05Jun2013 Ordered   17  Metoprolol Tartrate 50 MG Oral Tablet; take 1 tablet by mouth twice a day; Therapy: 71BHI5267 to (Herberth Gonzalez)  Requested for: 84VWK5041; Last    Rx:26Jan2016 Ordered   18  OneTouch Ultra Blue In [employer] Strip; TEST EVERY DAY AND AS NEEDED; Therapy: 60AYE5718 to (Evaluate:19Apr2016)  Requested for: 42Fpg2918; Last    Rx:70Uxc5955 Ordered   19  Pantoprazole Sodium 40 MG Oral Tablet Delayed Release; TAKE 1 TABLET DAILY;     Therapy: 72JGZ1549 to (Evaluate:02Apr2016)  Requested for: 91ZIP8868; Last    Rx:83Ift5875 Ordered   20  Pravastatin Sodium 80 MG Oral Tablet; take 1 tablet by mouth every day; Therapy: 54DVN0423 to (Evaluate:80Icd5164)  Requested for: 41Wbx9073; Last    Rx:70Iuf9944 Ordered   21  PredniSONE 5 MG Oral Tablet; Take 1-1/2 tablets daily; Therapy: 91VYE8120 to (Evaluate:14Mar2016)  Requested for: 51Ekp3137; Last    Rx:25Skz6526 Ordered   22  ROPINIRole HCl - 0 25 MG Oral Tablet; take 1 tablet by mouth twice a day; Therapy: 58FRL1987 to (Delanna Scale)  Requested for: 67Asz9328; Last    Rx:42Xpo0072 Ordered   23  Sertraline HCl - 100 MG Oral Tablet; TAKE 2 TABLETS DAILY; Therapy: 94TEK6622 to (Delanna Scale)  Requested for: 11CGV9709; Last    Rx:81Wja6069; Status: ACTIVE - Renewal Denied Ordered   24  Sodium Bicarbonate 650 MG Oral Tablet; Take one tablet daily; Therapy: 31RWY6170 to (Evaluate:12Apr2016)  Requested for: 31WXK2571 Recorded   25  Tacrolimus 1 MG Oral Capsule; TAKE 4 CAPSULES every morning and 3 capsules every    evening; Therapy: 77GZU8716 to (Tracy Canard)  Requested for: 27FRJ6779; Last    Rx:86Fzj8604 Ordered   26  TraZODone HCl - 150 MG Oral Tablet; TAKE 1 TABLET AT BEDTIME; Therapy: 53NND2924 to (Delanna Scale)  Requested for: 54MMX2076; Last    Rx:84Cwt9490; Status: ACTIVE - Renewal Denied Ordered   27  Vitamin D3 3000 UNIT Oral Tablet; 1 TAB DAILY; Therapy: 41XFB6587 to (Evaluate:21Imp9092) Recorded    Allergies    1  Penicillins   2  Morphine Derivatives   3  Duricef TABS    Plan  Chronic kidney disease, stage 3    · (1) BASIC METABOLIC PROFILE; Status:Active - Retrospective By Protocol  Authorization;  Requested for:20Lrg3920;     Signatures   Electronically signed by : EDGAR Castaneda ; Jan 28 2016  2:36PM EST

## 2018-01-11 NOTE — MISCELLANEOUS
Message   Recorded as Task   Date: 08/03/2016 01:13 PM, Created By: Kt Mehta   Task Name: Provider to Provider   Assigned To: Chriss Blum   Regarding Patient: Lauren Chapa, Status: In Progress   MattKoltoncrystal Bergeron - 03 Aug 2016 1:13 PM     TASK CREATED  please send letter I did today to Dr April Dela Cruz (her dentist)   Chriss Blum - 03 Aug 2016 1:55 PM     TASK IN PROGRESS   Ana Maria Stark - 03 Aug 2016 2:19 PM     TASK REPLIED TO: Previously Assigned To Ana Maria Stark  There is nothing in her chart, maybe it didn't save? Kt Mehta - 36 Aug 2016 3:56 PM     TASK REPLIED TO: Previously Assigned To Kt Mehta       under "Communication section"   went under 7/27 date for some reason   Letter that you did today 8/3/2016 has been sent to Dr April Dela Cruz this afternoon  [address] 8/3/2016      Active Problems    1  Abnormal EKG (794 31) (R94 31)   2  Acid reflux disease (530 81) (K21 9)   3  Acute on chronic diastolic congestive heart failure (428 33,428 0) (I50 33)   4  Anemia (285 9) (D64 9)   5  Antibiotic prophylaxis for dental procedure indicated due to prior joint replacement   (V58 62) (Z79 2)   6  Atrial fibrillation (427 31) (I48 91)   7  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)   8  Benign hypertensive CKD (403 10) (I12 9)   9  Bilateral leg edema (782 3) (R60 0)   10  Bruit of left carotid artery (785 9) (R09 89)   11  Cataract (366 9) (H26 9)   12  Chronic diastolic congestive heart failure (428 32,428 0) (I50 32)   13  Chronic kidney disease, stage 3 (585 3) (N18 3)   14  Cocaine abuse in remission (305 63) (F14 10)   15  Cognitive changes (799 59) (R41 89)   16  Constipation (564 00) (K59 00)   17  Depression with anxiety (300 4) (F41 8)   18  Diabetes mellitus with nephropathy (250 40,583 81) (E11 21)   19  Diabetic Gastropathy (537 9)   20  Diabetic polyneuropathy (250 60,357 2) (E11 42)   21  Diabetic retinopathy (250 50,362 01) (E11 319)   22   Diabetic Ulcer Of The Left Foot (250 80) 23  ROD (dyspnea on exertion) (786 09) (R06 09)   24  Drug-induced Essential Tremor (333 1)   25  Dysuria (788 1) (R30 0)   26  Encephalopathy (348 30) (G93 40)   27  Encounter for screening mammogram for breast cancer (V76 12) (Z12 31)   28  Esophagitis, reflux (530 11) (K21 0)   29  External Hemorrhoids (455 3)   30  FELIPE (generalized anxiety disorder) (300 02) (F41 1)   31  H/O kidney transplant (V42 0) (Z94 0)   32  Heart palpitations (785 1) (R00 2)   33  Hospital discharge follow-up (V67 59) (Z09)   34  Hyperlipidemia (272 4) (E78 5)   35  Hyperplastic colon polyp (211 3) (K63 5)   36  Hypertension (401 9) (I10)   37  Hypothyroidism (244 9) (E03 9)   38  Increased white blood cell count (288 60) (D72 829)   39  Insomnia (780 52) (G47 00)   40  Irritable bowel syndrome (564 1) (K58 9)   41  Major depressive disorder, recurrent episode, in full remission (296 36) (F33 42)   42  Memory loss (780 93) (R41 3)   43  Metabolic acidosis (223 3) (E87 2)   44  MGUS (monoclonal gammopathy of unknown significance) (273 1) (D47 2)   45  Neck pain (723 1) (M54 2)   46  Nonrheumatic aortic valve insufficiency (424 1) (I35 1)   47  Osteopenia (733 90) (M85 80)   48  Osteoporosis (733 00) (M81 0)   49  Peripheral vascular disease (443 9) (I73 9)   50  Post traumatic stress disorder (309 81) (F43 10)   51  Proteinuria (791 0) (R80 9)   52  Rectal polyp (569 0) (K62 1)   53  Recurrent UTI (599 0) (N39 0)   54  Restless legs syndrome (333 94) (G25 81)   55  Secondary hyperparathyroidism, renal (588 81) (N25 81)   56  Sinus Tachycardia (427 89)   57  SOB (shortness of breath) (786 05) (R06 02)   58  Tremor (781 0) (R25 1)   59  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 (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 65) (V23 29)   60  Unsteady gait (781 2) (R26 81)   61  Urinary tract infection (599 0) (N39 0)   62  UTI (urinary tract infection), bacterial (599 0,041 9) (N39 0,A49 9)   63  Vaginal itching (698 1) (L29 8)    Current Meds   1  AmLODIPine Besylate 5 MG Oral Tablet; TAKE 1 TABLET TWICE DAILY; Therapy: 04OQV9707 to (Evaluate:30Apr2017)  Requested for: 97XJN3693 Recorded   2  ARIPiprazole 20 MG Oral Tablet; TAKE 1 TABLET AT BEDTIME; Therapy: 54ZGV5742 to (Evaluate:91Oeo6661)  Requested for: 56LWG6819; Last   Rx:77Uay6913 Ordered   3  Aspirin 81 MG TABS; TAKE 1 TABLET DAILY; Therapy: 02LGR0912 to (Evaluate:19Jun2016) Recorded   4  Caltrate 600 TABS; TAKE 1 TABLET TWICE DAILY; Therapy: (Recorded:17Jun2016) to Recorded   5  Catapres 0 3 MG Oral Tablet (CloNIDine HCl); TAKE 0 3MG BY MOUTH 2 TIMES A DAY  INDICATIONS: 0 3MG IN AM, 0 2MG AT NOON, AND 0 3MG IN PM;   Therapy: 83EEC4824 to (Evaluate:30Apr2017)  Requested for: 89HKZ8392 Recorded   6  Cephalexin 500 MG Oral Capsule; TAKE 1 CAPSULE 3 TIMES DAILY; Therapy: 46XKC1227 to (Complete:91Yct5588)  Requested for: 36Jjz3250; Last   Rx:76Pqf2605 Ordered   7  Clindamycin HCl - 300 MG Oral Capsule; take 2 caps  1 hr  prior to dental procedure; Therapy: 80AFR5854 to (Last Rx:49Euf7904)  Requested for: 78FYH9014 Ordered   8  Doxazosin Mesylate 1 MG Oral Tablet; TAKE 1 TABLET AT BEDTIME; Therapy: (Recorded:17Jun2016) to Recorded   9  DULoxetine HCl - 60 MG Oral Capsule Delayed Release Particles (Cymbalta); TAKE 1   CAPSULE TWICE DAILY; Therapy: 37JZE3723 to (Evaluate:86Rzf3803)  Requested for: 62XLI5282; Last   Rx:69Tgj7980 Ordered   10  Folic Acid 1 MG Oral Tablet; TAKE 1 TABLET DAILY AS DIRECTED; Therapy: 35KER1412 to (Evaluate:85Ltz8437)  Requested for: 51CHJ8342; Last    Rx:75Fea1778 Ordered   11  Furosemide 40 MG Oral Tablet; TAKE 1 TO 2 TABLETS DAILY AS NEEDED FOR    EDEMA; Therapy: 74KEL1358 to (Evaluate:25Jun2017)  Requested for: 22DYP3753; Last    Rx:30Jun2016 Ordered   12  HydrALAZINE HCl - 50 MG Oral Tablet; TAKE 1 TABLET 3 TIMES DAILY; Therapy: 73OFL6570 to (Evaluate:26Jan2017)  Requested for: 84OVO4827; Last    Rx:55Hga3824 Ordered   13   Lactulose 10 GM/15ML Oral Solution; TAKE 30 ML DAILY; Therapy: (Recorded:17Jun2016) to Recorded   14  Levothyroxine Sodium 88 MCG Oral Tablet; take 1 tablet by mouth daily; Therapy: 42SLU2635 to (Melissa Craft)  Requested for: 73OGY4148; Last    Rx:30Hhf1048 Ordered   15  LORazepam 2 MG Oral Tablet; TAKE 1 TABLET TWICE DAILY AS NEEDED; Therapy: 52MOC9124 to (Evaluate:84Soj2106); Last Rx:70Vam2027 Ordered   16  Metoprolol Tartrate 50 MG Oral Tablet; take 1 tablet by mouth twice daily; Therapy: 13XHW2577 to (Evaluate:09Jun2017)  Requested for: 71YUM6623; Last    Rx:14Jun2016 Ordered   17  OneTouch Ultra Blue In [employer]; TEST TWICE A DAY; Therapy: 10RWO0393 to (Evaluate:66Xlm4245)  Requested for: 24OZE7218; Last    Rx:10Mar2016 Ordered   18  OneTouch UltraSoft Lancets Miscellaneous; test twice daily; Therapy: 30HBN2234 to (Roxy Asencio)  Requested for: 29YCN8414; Last    Rx:09Mar2016 Ordered   19  Pantoprazole Sodium 40 MG Oral Tablet Delayed Release; take 1 tablet by mouth every    day; Therapy: 20NHU3329 to (Evaluate:07Yja7084)  Requested for: 10KWJ9173; Last    Rx:10Ykc6320 Ordered   20  Pravastatin Sodium 80 MG Oral Tablet; take 1 tablet by mouth every day; Therapy: 11DOT2496 to (Haider Berrios)  Requested for: 46TXV2636; Last    UT:84GSP3874 Ordered   21  PredniSONE 5 MG Oral Tablet; TAKE ONE AND 1/2 TABLETS BY MOUTH DAILY; Therapy: 67XLM2912 to (Shelli Herbert)  Requested for: 64CQM3703; Last    Rx:09Mtk4497 Ordered   22  Prograf 1 MG Oral Capsule (Tacrolimus); Therapy: (Recorded:23Rql6326) to Recorded   23  ROPINIRole HCl - 0 25 MG Oral Tablet; take 1 tablet by mouth twice daily; Therapy: 86WMV9794 to (Evaluate:40Awt5058)  Requested for: 28FBE1457; Last    Rx:30Jun2016 Ordered   24  Sertraline HCl - 100 MG Oral Tablet; TAKE 2 TABLETS DAILY; Therapy: 93MCJ6798 to (Evaluate:08Cjk3094)  Requested for: 12KOT6021; Last    Rx:79Sti5262 Ordered   25   Sodium Bicarbonate 650 MG Oral Tablet; Take one tablet daily; Therapy: 45ZZC4610 to (Evaluate:12Apr2016)  Requested for: 89DWT7721 Recorded   26  TraZODone HCl - 150 MG Oral Tablet; TAKE 1 TABLET AT BEDTIME; Therapy: 49XGW5349 to (Evaluate:51Icc9494)  Requested for: 88HIY8011; Last    Rx:11Eem8121 Ordered   27  Vitamin D3 1000 UNIT Oral Capsule; TAKE 3 PILLS AT NOON BY MOUTH; Therapy: (Recorded:17Jun2016) to Recorded    Allergies    1  Penicillins   2  Morphine Derivatives   3   [place]    Signatures   Electronically signed by : EDGAR Reis ; Aug  4 2016  2:55PM EST

## 2018-01-11 NOTE — MISCELLANEOUS
Message  Patient called today with the following weights:  2/15/19 - 209 2/16/16 - 210 4  2/17/16 - 210 8  2/18/16 - 210  2/19/16 - 210 4  2/20/16 - 213 2  2/21/16 - 213 4  kja      Active Problems    1  Abnormal EKG (794 31) (R94 31)   2  Acute on chronic diastolic congestive heart failure (428 33,428 0) (I50 33)   3  Anemia (285 9) (D64 9)   4  Atrial fibrillation (427 31) (I48 91)   5  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)   6  Benign hypertensive CKD (403 10) (I12 9)   7  Bilateral leg edema (782 3) (R60 0)   8  Cataract (366 9) (H26 9)   9  Chronic kidney disease, stage 3 (585 3) (N18 3)   10  Cocaine abuse in remission (305 63) (F14 10)   11  Cognitive changes (799 59) (R41 89)   12  Constipation (564 00) (K59 00)   13  Diabetic Gastropathy (537 9)   14  Diabetic Nephropathy (583 81)   15  Diabetic polyneuropathy (250 60,357 2) (E11 42)   16  Diabetic retinopathy (250 50,362 01) (E11 319)   17  Diabetic Ulcer Of The Left Foot (250 80)   18  ROD (dyspnea on exertion) (786 09) (R06 09)   19  Drug-induced Essential Tremor (333 1)   20  Encounter for screening mammogram for breast cancer (V76 12) (Z12 31)   21  Esophageal reflux (530 81) (K21 9)   22  Esophagitis, reflux (530 11) (K21 0)   23  External Hemorrhoids (455 3)   24  FELIPE (generalized anxiety disorder) (300 02) (F41 1)   25  H/O kidney transplant (V42 0) (Z94 0)   26  Heart palpitations (785 1) (R00 2)   27  Hyperlipidemia (272 4) (E78 5)   28  Hyperplastic colon polyp (211 3) (K63 5)   29  Hypertension (401 9) (I10)   30  Hypothyroidism (244 9) (E03 9)   31  Increased white blood cell count (288 60) (D72 829)   32  Insomnia (780 52) (G47 00)   33  Irritable bowel syndrome (564 1) (K58 9)   34  Major depressive disorder, recurrent episode, in full remission (296 36) (F33 42)   35  Memory loss (780 93) (R41 3)   36  Metabolic acidosis (430 3) (E87 2)   37  MGUS (monoclonal gammopathy of unknown significance) (273 1) (D47 2)   38   Neck pain (723 1) (M54 2)   39  Nonrheumatic aortic valve insufficiency (424 1) (I35 1)   40  Osteopenia (733 90) (M85 80)   41  Osteoporosis (733 00) (M81 0)   42  Peripheral vascular disease (443 9) (I73 9)   43  Post traumatic stress disorder (309 81) (F43 10)   44  Proteinuria (791 0) (R80 9)   45  Rectal polyp (569 0) (K62 1)   46  Restless legs syndrome (333 94) (G25 81)   47  Secondary hyperparathyroidism, renal (588 81) (N25 81)   48  Sinus Tachycardia (427 89)   49  SOB (shortness of breath) (786 05) (R06 02)   50  Tremor (781 0) (R25 1)   51  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 (001 58) (D91 21)    Current Meds   1  AmLODIPine Besylate 10 MG Oral Tablet; take 1/2 tab  BID; Therapy: 94BSB7671 to (Evaluate:50Knn7294)  Requested for: 21Gdx1141 Recorded   2  ARIPiprazole 20 MG Oral Tablet; TAKE 1 TABLET AT BEDTIME; Therapy: 46JUK4343 to (Evaluate:49Pze2457)  Requested for: 51HJJ4380; Last   Rx:63Hvi6533 Ordered   3  Aspirin 81 MG Oral Tablet; TAKE 1 TABLET DAILY; Therapy: 94JGG8494 to (Evaluate:19Jun2016) Recorded   4  Carafate 1 GM/10ML Oral Suspension; Therapy: [phone]) to Recorded   5  Citracal TABS; Take one tablet twice daily Recorded   6  Clindamycin HCl - 300 MG Oral Capsule; TAKE 2 CAPSULES 1 HOUR PRIOR TO   DENTAL APPOINTMENT; Therapy: 30Lkp3696 to (Evaluate:88Ofu5018)  Requested for: 64WIT8345; Last   ND:24NDV8953 Ordered   7  CloNIDine HCl - 0 1 MG Oral Tablet; Take three tablets every morning, two tablets every   noon, and three tablets every evening; Therapy: 13NCH0755 to (Evaluate:79Qxj3555)  Requested for: 76RWR4325; Last   Rx:37Aox6453 Ordered   8  DULoxetine HCl - 60 MG Oral Capsule Delayed Release Particles (Cymbalta); TAKE 1   CAPSULE TWICE DAILY; Therapy: 27PUE9099 to (Evaluate:18Lie3962)  Requested for: 28IKT7140; Last   Rx:86Mon2331 Ordered   9  Folic Acid 1 MG Oral Tablet; TAKE 1 TABLET DAILY AS DIRECTED;    Therapy: 63YMB6265 to (Evaluate:38Wls5524)  Requested for: 97BPK4214; Last   Rx:83Jjk2191 Ordered   10  Furosemide 40 MG Oral Tablet; Take one tablet every morning and 1/2 tablet every    evening; Therapy: 19GXG4782 to (467 20 767)  Requested for: 12LYQ3687 Recorded   11  Generlac 10 GM/15ML SYRP Recorded   12  HydrALAZINE HCl - 50 MG Oral Tablet; TAKE 1 TABLET 3 TIMES DAILY; Therapy: 01ECV7055 to (Evaluate:26Jan2017)  Requested for: 45NZQ0925; Last    Rx:13Hht2867 Ordered   13  Hydrocodone-Acetaminophen 5-325 MG Oral Tablet; TAKE 1 TABLET EVERY 6 HOURS    AS NEEDED FOR PAIN;    Therapy: 62JME7878 to (Evaluate:07Jan2016); Last Rx:49Jgi6142 Ordered   14  Levothyroxine Sodium 88 MCG Oral Tablet (Synthroid); take 1 tablet every day; Therapy: 18DCQ9519 to (Last Rx:02Feb2016)  Requested for: 77Zzg2732 Ordered   15  LORazepam 2 MG Oral Tablet; TAKE 1 TABLET TWICE DAILY AS NEEDED; Therapy: 60DQC8227 to (Evaluate:58Sgz0111); Last Rx:51Mrh2117 Ordered   16  Magnesium  (64 Mg) MG TBCR; TAKE 1 TABLET DAILY; Therapy: 54AUD2491 to (Evaluate:11Vup3182); Last Rx:05Jun2013 Ordered   17  Metoprolol Tartrate 50 MG Oral Tablet; take 1 tablet by mouth twice a day; Therapy: 59XUM6399 to (Jacobo Miguel)  Requested for: 29JKN7771; Last    Rx:26Jan2016 Ordered   18  OneTouch Ultra Blue In V[employer] Strip; TEST EVERY DAY AND AS NEEDED; Therapy: 91EVW7783 to (Evaluate:19Apr2016)  Requested for: 85Czs9643; Last    Rx:85Ohd8784 Ordered   19  Pantoprazole Sodium 40 MG Oral Tablet Delayed Release; TAKE 1 TABLET DAILY; Therapy: 90KTX2702 to (Evaluate:02Apr2016)  Requested for: 10DFT0189; Last    Rx:76Obw9543 Ordered   20  Pravastatin Sodium 80 MG Oral Tablet; take 1 tablet by mouth every day; Therapy: 44AMG8746 to (Evaluate:97Jgk7424)  Requested for: 60Xey6832; Last    Rx:19Apr2015 Ordered   21  PredniSONE 5 MG Oral Tablet; Take 1-1/2 tablets daily;     Therapy: 24KSN8395 to (Evaluate:14Mar2016)  Requested for: 20Mar2015; Last    Rx:20Mar2015 Ordered   22  ROPINIRole HCl - 0 25 MG Oral Tablet; take 1 tablet by mouth twice daily; Therapy: 43ZWE8677 to (Evaluate:34Kjk2535)  Requested for: 25LMU6564; Last    Rx:16Feb2016 Ordered   23  Sertraline HCl - 100 MG Oral Tablet; TAKE 2 TABLETS DAILY; Therapy: 22REF4691 to (Evaluate:29Sxm5541)  Requested for: 22FWZ8547; Last    Rx:11Feb2016 Ordered   24  Sodium Bicarbonate 650 MG Oral Tablet; Take one tablet daily; Therapy: 96WUZ6064 to (Evaluate:12Apr2016)  Requested for: 45XRO9497 Recorded   25  Tacrolimus 1 MG Oral Capsule; TAKE 4 CAPSULES every morning and 3 capsules every    evening; Therapy: 71ABB2316 to (Evaluate:64Xck9263)  Requested for: 72FPN5910; Last    Rx:15Feb2016 Ordered   26  TraZODone HCl - 150 MG Oral Tablet; TAKE 1 TABLET AT BEDTIME; Therapy: 63GQC3745 to (Evaluate:07Qfn7950)  Requested for: 12YSD1098; Last    Rx:11Feb2016 Ordered   27  Vitamin D3 3000 UNIT Oral Tablet; 1 TAB DAILY; Therapy: 52ZPD3236 to (Evaluate:10Feb2016) Recorded    Allergies    1  Penicillins   2  Morphine Derivatives   3   [place]    Signatures   Electronically signed by : EDGAR Griffin ; Feb 22 2016 12:46PM EST
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
## 2018-01-11 NOTE — MISCELLANEOUS
Message  Patient called today with the following weights:  2/1/16 - 211 4  2/2/16 - 213 2  2/3/16 - 212 4  2/4/16 - 212 0  2/5/16 - 212 2  2/6/16 - 210 8  2/7/16 - 209 4  kja       Active Problems    1  Abnormal EKG (794 31) (R94 31)   2  Acute on chronic diastolic congestive heart failure (428 33,428 0) (I50 33)   3  Anemia (285 9) (D64 9)   4  Atrial fibrillation (427 31) (I48 91)   5  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)   6  Benign hypertensive CKD (403 10) (I12 9)   7  Bilateral leg edema (782 3) (R60 0)   8  Cataract (366 9) (H26 9)   9  Chronic kidney disease, stage 3 (585 3) (N18 3)   10  Cocaine abuse in remission (305 63) (F14 10)   11  Cognitive changes (799 59) (R41 89)   12  Constipation (564 00) (K59 00)   13  Diabetic Gastropathy (537 9)   14  Diabetic Nephropathy (583 81)   15  Diabetic polyneuropathy (250 60,357 2) (E11 42)   16  Diabetic retinopathy (250 50,362 01) (E11 319)   17  Diabetic Ulcer Of The Left Foot (250 80)   18  ROD (dyspnea on exertion) (786 09) (R06 09)   19  Drug-induced Essential Tremor (333 1)   20  Encounter for screening mammogram for breast cancer (V76 12) (Z12 31)   21  Esophageal reflux (530 81) (K21 9)   22  Esophagitis, reflux (530 11) (K21 0)   23  External Hemorrhoids (455 3)   24  FELIPE (generalized anxiety disorder) (300 02) (F41 1)   25  H/O kidney transplant (V42 0) (Z94 0)   26  Heart palpitations (785 1) (R00 2)   27  Hyperlipidemia (272 4) (E78 5)   28  Hyperplastic colon polyp (211 3) (K63 5)   29  Hypertension (401 9) (I10)   30  Hypothyroidism (244 9) (E03 9)   31  Increased white blood cell count (288 60) (D72 829)   32  Irritable bowel syndrome (564 1) (K58 9)   33  Major depressive disorder, recurrent episode, 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) (F33 41)   34  Memory loss (780 93) (R41 3)   35  Metabolic acidosis (022 1) (E87 2)   36  MGUS (monoclonal gammopathy of unknown significance) (273 1) (D47 2)   37  Neck pain (723 1) (M54 2)   38   Nonrheumatic aortic valve insufficiency (424 1) (I35 1)   39  Osteopenia (733 90) (M85 80)   40  Osteoporosis (733 00) (M81 0)   41  Peripheral vascular disease (443 9) (I73 9)   42  Post traumatic stress disorder (309 81) (F43 10)   43  Proteinuria (791 0) (R80 9)   44  Rectal polyp (569 0) (K62 1)   45  Restless legs syndrome (333 94) (G25 81)   46  Secondary hyperparathyroidism, renal (588 81) (N25 81)   47  Sinus Tachycardia (427 89)   48  SOB (shortness of breath) (786 05) (R06 02)   49  Tremor (781 0) (R25 1)   50  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 (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 34) (D27 03)    Current Meds   1  Abilify 20 MG Oral Tablet (ARIPiprazole); TAKE 1 TABLET AT BEDTIME; Therapy: 01AAN3067 to ([place] Cipro)  Requested for: 06Tpx2958; Last   Rx:91Rjn7734 Ordered   2  AmLODIPine Besylate 10 MG Oral Tablet; take 1/2 tab  BID; Therapy: 59JEI5533 to (Evaluate:49Jpf1080)  Requested for: 71Ezx3689 Recorded   3  Aspirin 81 MG Oral Tablet; TAKE 1 TABLET DAILY; Therapy: 49WKZ6389 to (Evaluate:19Jun2016) Recorded   4  Carafate 1 GM/10ML Oral Suspension; Therapy: (0699 982 13 20) to Recorded   5  Citracal TABS; Take one tablet twice daily Recorded   6  Clindamycin HCl - 300 MG Oral Capsule; TAKE 2 CAPSULES 1 HOUR PRIOR TO   DENTAL APPOINTMENT; Therapy: 30Apr2015 to (Evaluate:94Asv4479)  Requested for: 49FGS9334; Last   GV:19SWN7783 Ordered   7  CloNIDine HCl - 0 1 MG Oral Tablet; Take three tablets every morning, two tablets every   noon, and three tablets every evening; Therapy: 26JOJ8089 to (Evaluate:14Mar2016)  Requested for: 20Mar2015; Last   Rx:20Mar2015 Ordered   8  DULoxetine HCl - 60 MG Oral Capsule Delayed Release Particles (Cymbalta); TAKE 1   CAPSULE TWICE DAILY; Therapy: 20GUI0800 to ([place] Cipro)  Requested for: 73DGL1758; Last   Rx:01Tdq2082; Status: ACTIVE - Renewal Denied Ordered   9  Folic Acid 1 MG Oral Tablet; TAKE 1 TABLET DAILY AS DIRECTED;    Therapy: 52NAT6601 to (Evaluate:79Yuq3419)  Requested for: 01DUU2827; Last   Rx:27Uls4175 Ordered   10  Furosemide 40 MG Oral Tablet; Take one tablet every morning and 1/2 tablet every    evening; Therapy: 72ULA7699 to (Gerry Hallman)  Requested for: 10EDI0092 Recorded   11  Generlac 10 GM/15ML SYRP Recorded   12  HydrALAZINE HCl - 50 MG Oral Tablet; TAKE 1 TABLET 3 TIMES DAILY; Therapy: 73WCC1180 to (Evaluate:26Jan2017)  Requested for: 71BNI2641; Last    Rx:25Okh4786 Ordered   13  Hydrocodone-Acetaminophen 5-325 MG Oral Tablet; TAKE 1 TABLET EVERY 6 HOURS    AS NEEDED FOR PAIN;    Therapy: 17DPI6124 to (Evaluate:07Jan2016); Last Rx:84Nez3587 Ordered   14  Levothyroxine Sodium 88 MCG Oral Tablet (Synthroid); take 1 tablet every day; Therapy: 85RFW5147 to (Last Rx:02Feb2016)  Requested for: 85Tnn5035 Ordered   15  LORazepam 2 MG Oral Tablet; TAKE 1 TABLET TWICE DAILY AS NEEDED; Therapy: 00PLQ8695 to (Evaluate:19Jun2016) Recorded   16  Magnesium  (64 Mg) MG TBCR; TAKE 1 TABLET DAILY; Therapy: 06VZC3387 to (Evaluate:02Dec2013); Last Rx:05Jun2013 Ordered   17  Metoprolol Tartrate 50 MG Oral Tablet; take 1 tablet by mouth twice a day; Therapy: 66XLC1198 to (Andree Posey)  Requested for: 48MTY4132; Last    Rx:26Jan2016 Ordered   18  OneTouch Ultra Blue In Vitro Strip; TEST EVERY DAY AND AS NEEDED; Therapy: 54OHY2363 to (Evaluate:19Apr2016)  Requested for: 17Pcr3628; Last    Rx:14Cjd7355 Ordered   19  Pantoprazole Sodium 40 MG Oral Tablet Delayed Release; TAKE 1 TABLET DAILY; Therapy: 53XNI6337 to (Evaluate:02Apr2016)  Requested for: 30NOO1006; Last    Rx:49Ghw4599 Ordered   20  Pravastatin Sodium 80 MG Oral Tablet; take 1 tablet by mouth every day; Therapy: 67JIY1610 to (Evaluate:01Zpu8654)  Requested for: 98Exu1139; Last    Rx:19Apr2015 Ordered   21  PredniSONE 5 MG Oral Tablet; Take 1-1/2 tablets daily; Therapy: 14WFR4109 to (Evaluate:14Mar2016)  Requested for: 20Mar2015;  Last    Rx:20Mar2015 Ordered   22  ROPINIRole HCl - 0 25 MG Oral Tablet; take 1 tablet by mouth twice a day; Therapy: 78DNH2263 to (Adolfo Most)  Requested for: 69Zra6685; Last    Rx:89Vdt4901 Ordered   23  Sertraline HCl - 100 MG Oral Tablet; TAKE 2 TABLETS DAILY; Therapy: 74RYJ1242 to (Adolfo Most)  Requested for: 23HCB7193; Last    Rx:29Fdu2165; Status: ACTIVE - Renewal Denied Ordered   24  Sodium Bicarbonate 650 MG Oral Tablet; Take one tablet daily; Therapy: 78GJM6204 to (Evaluate:2016)  Requested for: 21HYL2698 Recorded   25  Tacrolimus 1 MG Oral Capsule; TAKE 4 CAPSULES every morning and 3 capsules every    evening; Therapy: 12IWS0520 to ( [place] )  Requested for: 23RJE3234; Last    Rx:63Lal9436 Ordered   26  TraZODone HCl - 150 MG Oral Tablet; TAKE 1 TABLET AT BEDTIME; Therapy: 22LNJ6333 to (Adolfo Most)  Requested for: 78FSD3698; Last    Rx:45Iac3042; Status: ACTIVE - Renewal Denied Ordered   27  Vitamin D3 3000 UNIT Oral Tablet; 1 TAB DAILY; Therapy: 68FMM5647 to (Evaluate:00Lph2139) Recorded    Allergies    1  Penicillins   2  Morphine Derivatives   3   [place]a Apgar    Signatures   Electronically signed by : EDGAR Walker ; 2016  1:32PM EST

## 2018-01-11 NOTE — MISCELLANEOUS
Message   Recorded as Task   Date: 08/02/2017 05:24 PM, Created By: Gretchen Red   Task Name: Follow Up   Assigned To: China Vieira   Regarding Patient: Claudia Irwin, Status: In Progress   Comment:    Humza Gusman - 02 Aug 2017 5:24 PM     TASK CREATED  Hello    Hope you are well  In addition to the Endocrine consultation as we earlier discussed, can patient have following labs drawn early next week    - BMP, tacrolimus, SPEP, UPEP, Free light chain, amylase, lipase, C peptide, and C3, C4    Thank you    Ana Maria Serrano - 03 Aug 2017 8:32 AM     TASK IN PROGRESS   I spoke with MJ in regards to the above task  Per your request she has been referred to Endocrinology Dr Garland Garrido  Both referral and lab ordered have been mailed to pt home address  [facility] 8/3/2017    thank you  np       Active Problems     1  Abnormal EKG (794 31) (R94 31)   2  Acid reflux disease (530 81) (K21 9)   3  Acute kidney injury superimposed on CKD (866 00,585 9) (N17 9,N18 9)   4  Antibiotic prophylaxis for dental procedure indicated due to prior joint replacement   (V58 62) (Z79 2)   5  Atrial fibrillation (427 31) (I48 91)   6  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)   7  Benign hypertensive CKD (403 10) (I12 9)   8  Bilateral carotid bruits (785 9) (R09 89)   9  Bilateral leg edema (782 3) (R60 0)   10  Bruit of left carotid artery (785 9) (R09 89)   11  Cataract (366 9) (H26 9)   12  Chronic constipation (564 00) (K59 09)   13  Chronic diastolic congestive heart failure (428 32,428 0) (I50 32)   14  Cocaine abuse in remission (305 63) (F14 10)   15  Cognitive changes (799 59) (R41 89)   16  Colon cancer screening (V76 51) (Z12 11)   17  Constipation (564 00) (K59 00)   18  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, without long-term    current use of insulin (250 61) (E10 49)   19  Diabetes mellitus with diabetic nephropathy (250 40,583 81) (E11 21)   20  Diabetic Gastropathy (537 9)   21   Diabetic polyneuropathy (250 60,357 2) (E11 42)   22  Diabetic retinopathy (250 50,362 01) (E11 319)   23  Diabetic Ulcer Of The Left Foot (250 80)   24  Diastolic dysfunction (161 1) (I51 9)   25  ROD (dyspnea on exertion) (786 09) (R06 09)   26  Drug-induced Essential Tremor (333 1)   27  Encounter for screening mammogram for breast cancer (V76 12) (Z12 31)   28  Esophagitis, reflux (530 11) (K21 0)   29  External Hemorrhoids (455 3)   30  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)   31  Fatigue (780 79) (R53 83)   32  FELIPE (generalized anxiety disorder) (300 02) (F41 1)   33  H/O kidney transplant (V42 0) (Z94 0)   34  Heart palpitations (785 1) (R00 2)   35  Hospital discharge follow-up (V67 59) (Z09)   36  Hyperlipidemia (272 4) (E78 5)   37  Hyperplastic colon polyp (211 3) (K63 5)   38  Hyponatremia (276 1) (E87 1)   39  Hypothyroidism (244 9) (E03 9)   40  Increased frequency of urination (788 41) (R35 0)   41  Increased white blood cell count (288 60) (D72 829)   42  Insomnia (780 52) (G47 00)   43  Irritable bowel syndrome (564 1) (K58 9)   44  Major depressive disorder, recurrent episode, in full remission (296 36) (F33 42)   45  Memory loss (780 93) (R41 3)   46  Memory loss or impairment (780 93) (R41 3)   47  Metabolic acidosis (063 0) (E87 2)   48  MGUS (monoclonal gammopathy of unknown significance) (273 1) (D47 2)   49  Need for influenza vaccination (V04 81) (Z23)   50  Nonrheumatic aortic valve insufficiency (424 1) (I35 1)   51  OAB (overactive bladder) (596 51) (N32 81)   52  Osteopenia (733 90) (M85 80)   53  Osteoporosis (733 00) (M81 0)   54  Other calculus in bladder (594 1) (N21 0)   55  Peripheral vascular disease (443 9) (I73 9)   56  Post traumatic stress disorder (309 81) (F43 10)   57  Preop general physical exam (V72 83) (Z01 818)   58  Preoperative cardiovascular examination (V72 81) (Z01 810)   59  Proteinuria (791 0) (R80 9)   60  Pyelonephritis (590 80) (N12)   61   Rectal polyp (569 0) (K62 1)   62  Recurrent UTI (599 0) (N39 0)   63  Restless legs syndrome (333 94) (G25 81)   64  Secondary hyperparathyroidism, renal (588 81) (N25 81)   65  Snoring (786 09) (R06 83)   66  SOB (shortness of breath) (786 05) (R06 02)   67  Tremor (781 0) (R25 1)   68  Unsteady gait (781 2) (R26 81)   69  Weakness (780 79) (R53 1)    Anemia (285 9) (D64 9)       Chronic kidney disease, stage 4 (severe) (585 4) (N18 4)     - 2013 US   no cysts       Hypertension (401 9) (I10)          Current Meds   1  AmLODIPine Besylate 10 MG Oral Tablet; take 1/2 tab  BID; Therapy: 90WAI9163 to (Last Rx:07Apr2017)  Requested for: 61FUU3680 Ordered   2  ARIPiprazole 30 MG Oral Tablet; Take 1 tablet by mouth at bedtime; Therapy: 07NIT2073 to (Evaluate:00Cln4888)  Requested for: 64KFH8631; Last   Rx:49Tza4368 Ordered   3  Aspirin 81 MG TABS; TAKE 1 TABLET DAILY; Therapy: 04TWN2396 to (Evaluate:19Jun2016) Recorded   4  Catapres 0 1 MG Oral Tablet; take 3 tab  in am, 2 tab at noon, 3 tab  at night; Therapy: 37NGY3958 to (Last Rx:48Jkt2049)  Requested for: 65Zfq0765 Ordered   5  Clindamycin HCl - 300 MG Oral Capsule; TAKE 2 CAPSULES 1 HOUR PRIOR TO   DENTAL APPOINTMENT; Therapy: 39RPZ2969 to (05 06 52 16 25)  Requested for: 13Apr2017; Last   Rx:07Apr2017 Ordered   6  Doxazosin Mesylate 1 MG Oral Tablet; TAKE 1 TABLET Bedtime  Requested for:   95CTJ5345; Last Rx:05Apr2017 Ordered   7  DULoxetine HCl - 60 MG Oral Capsule Delayed Release Particles; TAKE 1 CAPSULE   TWICE DAILY; Therapy: 41ZRF4666 to (Evaluate:02Qkg6448)  Requested for: 31CKQ0956; Last   Rx:02Mar2017 Ordered   8  Folic Acid 1 MG Oral Tablet; TAKE 1 TABLET DAILY AS DIRECTED; Therapy: 80VOU7021 to (Evaluate:07Oct2017)  Requested for: 49DQD7142; Last   Rx:12Oct2016 Ordered   9  HydrALAZINE HCl - 50 MG Oral Tablet; TAKE 1 TABLET 3 TIMES DAILY; Therapy: 88FWB9636 to (Jenn Pickett)  Requested for: 17DIZ3136; Last   Rx:13Mar2017 Ordered   10  Lactulose 10 GM/15ML Oral Solution; TAKE 30 ML DAILY; Therapy: 35UVU7310 to (Last Rx:02Jun2017)  Requested for: 02Jun2017 Ordered   11  Levothyroxine Sodium 88 MCG Oral Tablet; take 1 tablet by mouth daily; Therapy: 15EEK5326 to (Evaluate:15Mar2017)  Requested for: 39OCH3513; Last    Rx:62Eix2574 Ordered   12  LORazepam 2 MG Oral Tablet; TAKE 1 TABLET 3 TIMES DAILY AS NEEDED; Therapy: 34UAI0357 to (Evaluate:68Fqq9106)  Requested for: 54WCJ5658; Last    Rx:92Ntb2533 Ordered   13  Magnesium 200 MG Oral Tablet; Therapy: 09KZL5660 to Recorded   14  Metoprolol Tartrate 50 MG Oral Tablet; take 1 tablet by mouth twice daily; Therapy: 49NXZ9973 to (Evaluate:53Cmh6058)  Requested for: 27Jun2017; Last    IH:62PCT8273 Ordered   15  OneTouch Ultra Blue In [employer]; test twice daily; Therapy: 36IPV3896 to (Evaluate:24Mar2018)  Requested for: 80DRY1399; Last    Rx:29Mar2017 Ordered   16  OneTouch UltraSoft Lancets Miscellaneous; test twice daily; Therapy: 20XTH3645 to (Daniella Grills)  Requested for: 04ICW0906; Last    Rx:09Mar2016 Ordered   17  Pravastatin Sodium 80 MG Oral Tablet; take 1 tablet by mouth every day; Therapy: 87UUR0416 to (Evaluate:64Xnf2621)  Requested for: 49LKV7181; Last    Rx:22Jan2017 Ordered   18  PredniSONE 5 MG Oral Tablet; TAKE 1 1/2 TABLETS BY MOUTH EVERY DAY; Therapy: 59SWR1471 to (Evaluate:41Ibp4406)  Requested for: 54RLH5934; Last    Rx:06Jun2017 Ordered   19  ROPINIRole HCl - 0 25 MG Oral Tablet; take 1 tablet by mouth twice daily; Therapy: 01OBK6292 to (Evaluate:44Udy2304)  Requested for: 65BGJ6250; Last    Rx:30Jun2016 Ordered   20  Sertraline HCl - 100 MG Oral Tablet; TAKE 2 TABLET BY MOUTH DAILY; Therapy: 41SCB4943 to [phone])  Requested for: 56SCZ3275; Last    Rx:31Ndv9124 Ordered   21  Sodium Bicarbonate 650 MG Oral Tablet; Take one tablet daily; Therapy: 95EZT2704 to (Evaluate:12Apr2016)  Requested for: 83PCL5722 Recorded   22   Tacrolimus 1 MG Oral Capsule; Take 4 in the morning and 3 in the evening  Requested    for: 15Xhc8638; Last Rx:84Omh2674 Ordered   23  TraZODone HCl - 150 MG Oral Tablet; TAKE 1 TABLET AT BEDTIME; Therapy: 66ELJ0146 to (Evaluate:00Czy9705)  Requested for: 34DXU9339; Last    Rx:02Mar2017 Ordered   24  VESIcare 5 MG Oral Tablet; take one tablet daily; Therapy: 20Feb2017 to Recorded   25  Vitamin D3 1000 UNIT Oral Capsule; TAKE 3 PILLS AT NOON BY MOUTH; Therapy: (Recorded:17Jun2016) to Recorded    Allergies    1  Penicillins   2  Morphine Derivatives   3  Duricef TABS   4   Myrbetriq ZI83    Signatures   Electronically signed by : EDGAR Woodward ; Aug  8 2017  9:44PM EST                       (Author)

## 2018-01-11 NOTE — PROGRESS NOTES
Patient Health Assessment    Date:            10/17/2016  Blood Pressure:  156/65  Pulse:           58  Age:             51  Weight:          0 lbs  Height/Length:   0' 0"  Body Mass Index: 0 0  Provider:        FANG  Clinic:          [facility] 39: Diabetes    Heart Condition    Heart Murmur    High Blood Pressure    Kidney Disease    Latex Allergy    Latex Allergy    Organ Transplant    anemia  Medications: Allergies:      Penicillin  Since Last Visit: Medical Alert: No Change    Medications: No Change    Allergies:        No Change  Pain Scale Type: Numeric Pain ScalePain Level: 0  Description:    Pt presents for treatment planning  Pt brought film and print radiographs  from previous dentist  Reviewed images  Open margin #4 D, tx planned for DO  composite resin  Missing #28  Due to pt medical history (organ transplant,  immunosuppression) pt not good candidate for implant  Pt tx planned for FPD  #27-29  Completed perio charting  Some BOP, PD >4 in UR  Pt tx planned for  SRP UR  Other regions gingiva is pink, stippled, non-inflammed despite  significant bone loss and recession  Provided pt with med clearance form to be completed by PCP  Scanned form and tx   plan to [facility]      Maria Fernanda    ----- Signed on Monday, October 17, 2016 at 11:41:41 AM  -----  ----- Provider: JASON - Resident Two, Dentist -- Clinic: [facility] -----

## 2018-01-11 NOTE — MISCELLANEOUS
Message   Recorded as Task   Date: 04/26/2017 09:59 AM, Created By: Dmitri Mccallum   Task Name: Miscellaneous   Assigned To: Aguilar Shin   Regarding Patient: Analia Levine, Status: In Progress   Comment:    Sundeep Boyle - 26 Apr 2017 9:59 AM     TASK CREATED  Tac level waslow  Please see if MJ missed any doses  Verify what dose she is taking and let me know  Th Natalie Coles - 26 Apr 2017 10:09 AM     TASK IN PROGRESS   Maggi George - 26 Apr 2017 10:10 AM     TASK REPLIED TO: Previously Assigned To Maggi George  I called and spoke with Dina Burns  She stated that she has not missed any doses of Prograf  She verified that she is on Prograf 4mg AM and 4mg PM    Dmitri Mccallum - 26 Apr 2017 12:22 PM     TASK REPLIED TO: Previously Assigned To Ana Maria Stark  Let's repeat Tac level now   I spoke with MJ in regards to the above task, she states she has not missed any doses of prograf  A repeat prograf has been ordered for tomorrow 4/27 as she already has taken her dose for today  I did emphasize the importance of waiting at least 12 hours between last dose and morning of blood draw  Pt had no questions at this time  [facility] 4/26/2017      Active Problems    1  Abnormal EKG (794 31) (R94 31)   2  Acid reflux disease (530 81) (K21 9)   3  Acute kidney injury superimposed on CKD (866 00,585 9) (N17 9,N18 9)   4  Allergic urticaria (708 0) (L50 0)   5  Anemia (285 9) (D64 9)   6  Antibiotic prophylaxis for dental procedure indicated due to prior joint replacement   (V58 62) (Z79 2)   7  Atrial fibrillation (427 31) (I48 91)   8  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)   9  Benign hypertensive CKD (403 10) (I12 9)   10  Bilateral impacted cerumen (380 4) (H61 23)   11  Bilateral leg edema (782 3) (R60 0)   12  Bruit of left carotid artery (785 9) (R09 89)   13  Cataract (366 9) (H26 9)   14  Chronic diastolic congestive heart failure (428 32,428 0) (I50 32)   15   Chronic kidney disease, stage 4 (severe) (585 4) (N18 4) 16  Cocaine abuse in remission (305 63) (F14 10)   17  Cognitive changes (799 59) (R41 89)   18  Constipation (564 00) (K59 00)   19  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, without long-term    current use of insulin (250 61) (E10 49)   20  Diabetes mellitus with nephropathy (250 40,583 81) (E11 21)   21  Diabetic Gastropathy (537 9)   22  Diabetic polyneuropathy (250 60,357 2) (E11 42)   23  Diabetic retinopathy (250 50,362 01) (E11 319)   24  Diabetic Ulcer Of The Left Foot (250 80)   25  Diastolic dysfunction (483 4) (I51 9)   26  ROD (dyspnea on exertion) (786 09) (R06 09)   27  Drug-induced Essential Tremor (333 1)   28  Dysuria (788 1) (R30 0)   29  Encephalopathy (348 30) (G93 40)   30  Encounter for screening mammogram for breast cancer (V76 12) (Z12 31)   31  Esophagitis, reflux (530 11) (K21 0)   32  External Hemorrhoids (455 3)   33  Fatigue (780 79) (R53 83)   34  FELIPE (generalized anxiety disorder) (300 02) (F41 1)   35  H/O kidney transplant (V42 0) (Z94 0)   36  Heart palpitations (785 1) (R00 2)   37  Hospital discharge follow-up (V67 59) (Z09)   38  Hyperlipidemia (272 4) (E78 5)   39  Hyperplastic colon polyp (211 3) (K63 5)   40  Hypertension (401 9) (I10)   41  Hyponatremia (276 1) (E87 1)   42  Hypothyroidism (244 9) (E03 9)   43  Increased frequency of urination (788 41) (R35 0)   44  Increased white blood cell count (288 60) (D72 829)   45  Insomnia (780 52) (G47 00)   46  Irritable bowel syndrome (564 1) (K58 9)   47  Major depressive disorder, recurrent episode, in full remission (296 36) (F33 42)   48  Memory loss (780 93) (R41 3)   49  Memory loss or impairment (780 93) (R41 3)   50  Metabolic acidosis (776 7) (E87 2)   51  MGUS (monoclonal gammopathy of unknown significance) (273 1) (D47 2)   52  Neck pain (723 1) (M54 2)   53  Need for influenza vaccination (V04 81) (Z23)   54  Nonrheumatic aortic valve insufficiency (424 1) (I35 1)   55   OAB (overactive bladder) (596 51) (N32 81)   56  Osteopenia (733 90) (M85 80)   57  Osteoporosis (733 00) (M81 0)   58  Other calculus in bladder (594 1) (N21 0)   59  Peripheral vascular disease (443 9) (I73 9)   60  Post traumatic stress disorder (309 81) (F43 10)   61  Preop general physical exam (V72 83) (Z01 818)   62  Preoperative cardiovascular examination (V72 81) (Z01 810)   63  Proteinuria (791 0) (R80 9)   64  Rectal polyp (569 0) (K62 1)   65  Recurrent UTI (599 0) (N39 0)   66  Restless legs syndrome (333 94) (G25 81)   67  Secondary hyperparathyroidism, renal (588 81) (N25 81)   68  Sinus Tachycardia (427 89)   69  Snoring (786 09) (R06 83)   70  SOB (shortness of breath) (786 05) (R06 02)   71  Tremor (781 0) (R25 1)   72  Unsteady gait (781 2) (R26 81)   73  UTI (urinary tract infection) (599 0) (N39 0)    Current Meds   1  AmLODIPine Besylate 10 MG Oral Tablet; take 1/2 tab  BID; Therapy: 35PFI0386 to (Last Rx:07Apr2017)  Requested for: 07Apr2017 Ordered   2  ARIPiprazole 30 MG Oral Tablet; TAKE 1 TABLET AT BEDTIME; Therapy: 82MXM8310 to (Evaluate:12Kok8458)  Requested for: 03XBG5781; Last   Rx:16Nov2016 Ordered   3  Aspirin 81 MG TABS; TAKE 1 TABLET DAILY; Therapy: 53CKS3721 to (Evaluate:19Jun2016) Recorded   4  Catapres 0 1 MG Oral Tablet (CloNIDine HCl); take 3 tab  in am, 2 tab at noon, 3 tab  at   night; Therapy: 59YNY3020 to (Last Rx:22Mar2017)  Requested for: 23Mar2017 Ordered   5  Clindamycin HCl - 300 MG Oral Capsule; TAKE 2 CAPSULES 1 HOUR PRIOR TO   DENTAL APPOINTMENT; Therapy: 74VOP6486 to (Whispering Gibbon Cedeño)  Requested for: 13Apr2017; Last   Rx:07Apr2017 Ordered   6  Doxazosin Mesylate 1 MG Oral Tablet; TAKE 1 TABLET Bedtime  Requested for:   20Apr2017; Last Rx:05Apr2017 Ordered   7  DULoxetine HCl - 60 MG Oral Capsule Delayed Release Particles (Cymbalta); TAKE 1   CAPSULE TWICE DAILY; Therapy: 32UZA6034 to (Evaluate:86Jpg7039)  Requested for: 55NKQ2224; Last   Rx:02Mar2017 Ordered   8  Flonase Allergy Relief 50 MCG/ACT Nasal Suspension (Fluticasone Propionate); USE 2   SPRAYS IN EACH NOSTRIL ONCE DAILY; Therapy: 27BJE4726 to Recorded   9  Folic Acid 1 MG Oral Tablet; TAKE 1 TABLET DAILY AS DIRECTED; Therapy: 38IMR0641 to (Evaluate:07Oct2017)  Requested for: 18YZJ5414; Last   Rx:15Qlh0839 Ordered   10  Furosemide 20 MG Oral Tablet; take 1/2 tab  daily; Therapy: 48Cmd9805 to Recorded   11  Generlac 10 GM/15ML Oral Solution; TAKE 15 ML TWICE DAILY; Therapy: 93ONR0135 to Recorded   12  HydrALAZINE HCl - 50 MG Oral Tablet; TAKE 1 TABLET 3 TIMES DAILY; Therapy: 07FMH8672 to (Pita Lentz)  Requested for: 92NGA6090; Last    Rx:13Mar2017 Ordered   13  Levothyroxine Sodium 88 MCG Oral Tablet; take 1 tablet by mouth daily; Therapy: 44DBH1639 to (Evaluate:15Mar2017)  Requested for: 54Xxn7416; Last    Rx:04Gai8177 Ordered   14  LORazepam 2 MG Oral Tablet; TAKE 1 TABLET 3 TIMES DAILY AS NEEDED; Therapy: 95XUW9807 to (Evaluate:15May2017)  Requested for: 98AMI6186; Last    Rx:63Ris3881 Ordered   15  Magnesium 200 MG Oral Tablet; Therapy: 86HAP9588 to Recorded   16  Metoprolol Tartrate 50 MG Oral Tablet; take 1 tablet by mouth twice daily; Therapy: 33JCC8253 to (Evaluate:09Jun2017)  Requested for: 68VHA4090; Last    Rx:14Jun2016 Ordered   17  OneTouch Ultra Blue In [employer]; test twice daily; Therapy: 76TML5180 to (Evaluate:24Mar2018)  Requested for: 98MGG2265; Last    Rx:29Mar2017 Ordered   18  OneTouch UltraSoft Lancets Miscellaneous; test twice daily; Therapy: 30SPQ3164 to (August Mis)  Requested for: 17IBV3413; Last    Rx:09Mar2016 Ordered   19  Pantoprazole Sodium 40 MG Oral Tablet Delayed Release; take 1 tablet by mouth every    day; Therapy: 85UVL1855 to (Evaluate:59Nbc7823)  Requested for: 93MWX4429; Last    Rx:75Aew6936 Ordered   20  Pravastatin Sodium 80 MG Oral Tablet; take 1 tablet by mouth every day;     Therapy: 41PAP9271 to (Evaluate:77Nkn2804) Requested for: 64GZP8734; Last    Rx:22Jan2017 Ordered   21  PredniSONE 5 MG Oral Tablet; TAKE ONE AND 1/2 TABLETS BY MOUTH DAILY; Therapy: 38KHC4484 to (Faby Holes)  Requested for: 03XJO1980; Last    Rx:24May2016 Ordered   22  ROPINIRole HCl - 0 25 MG Oral Tablet; take 1 tablet by mouth twice daily; Therapy: 87ROR7692 to (Evaluate:27Hzh2674)  Requested for: 71XVK9239; Last    Rx:30Jun2016 Ordered   23  Sertraline HCl - 100 MG Oral Tablet; TAKE 2 TABLETS DAILY; Therapy: 46ZQO8151 to (Evaluate:53Auf6634)  Requested for: 14LHT2401; Last    Rx:16Nov2016 Ordered   24  Sodium Bicarbonate 650 MG Oral Tablet; Take one tablet daily; Therapy: 32WGT9650 to (Evaluate:96Sut0219)  Requested for: 00WZH4980 Recorded   25  Tacrolimus 1 MG Oral Capsule (Prograf); Take 4 in the morning and 4 in the evening     Requested for: 91Ohr9659; Last Rx:13Apr2017 Ordered   26  TraZODone HCl - 150 MG Oral Tablet; TAKE 1 TABLET AT BEDTIME; Therapy: 92NOD8096 to (Evaluate:92Fyz8779)  Requested for: 32FBP8807; Last    Rx:02Mar2017 Ordered   27  VESIcare 5 MG Oral Tablet; take one tablet daily; Therapy: 74Mhu2696 to Recorded   28  Vitamin D3 1000 UNIT Oral Capsule; TAKE 3 PILLS AT NOON BY MOUTH; Therapy: (Recorded:17Jun2016) to Recorded    Allergies    1  Penicillins   2  Morphine Derivatives   3  Duricef TABS   4   Myrbetriq ZL35    Signatures   Electronically signed by : EDGAR Reis ; Apr 26 2017  2:24PM EST
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
## 2018-01-11 NOTE — PROGRESS NOTES
History of Present Illness  Care Coordination Encounter Information:   Type of Encounter: Telephonic   Contact: Initial Contact   Last Office Visit: 2/21/17   Spoke to Patient  Care Coordination [facility]: Outreached patient to see how she is doing since her transitin of care visit on 2/21/17  She states she feels great  She is really trying to take care of herself  She has lost 20 pounds in the last month  Blood pressure today was 140/50 she states that is really good for her  Weight today 212 pounds  No questions about medications  She will call the office if something comes up  I did remind her of her appointments in April with cardiology and nephrology  Active Problems    1  Abnormal EKG (794 31) (R94 31)   2  Acid reflux disease (530 81) (K21 9)   3  Acute kidney injury superimposed on CKD (866 00,585 9) (N17 9,N18 9)   4  Allergic urticaria (708 0) (L50 0)   5  Anemia (285 9) (D64 9)   6  Antibiotic prophylaxis for dental procedure indicated due to prior joint replacement   (V58 62) (Z79 2)   7  Atrial fibrillation (427 31) (I48 91)   8  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)   9  Benign hypertensive CKD (403 10) (I12 9)   10  Bilateral leg edema (782 3) (R60 0)   11  Bruit of left carotid artery (785 9) (R09 89)   12  Cataract (366 9) (H26 9)   13  Chronic diastolic congestive heart failure (428 32,428 0) (I50 32)   14  Chronic kidney disease, stage 4 (severe) (585 4) (N18 4)   15  Cocaine abuse in remission (305 63) (F14 10)   16  Cognitive changes (799 59) (R41 89)   17  Constipation (564 00) (K59 00)   18  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, without long-term    current use of insulin (250 61) (E10 49)   19  Diabetes mellitus with nephropathy (250 40,583 81) (E11 21)   20  Diabetic Gastropathy (537 9)   21  Diabetic polyneuropathy (250 60,357 2) (E11 42)   22  Diabetic retinopathy (250 50,362 01) (E11 319)   23  Diabetic Ulcer Of The Left Foot (250 80)   24  Diastolic dysfunction (783 2) (I51 9)   25  ROD (dyspnea on exertion) (786 09) (R06 09)   26  Drug-induced Essential Tremor (333 1)   27  Dysuria (788 1) (R30 0)   28  Encephalopathy (348 30) (G93 40)   29  Encounter for screening mammogram for breast cancer (V76 12) (Z12 31)   30  Esophagitis, reflux (530 11) (K21 0)   31  External Hemorrhoids (455 3)   32  Fatigue (780 79) (R53 83)   33  FELIPE (generalized anxiety disorder) (300 02) (F41 1)   34  H/O kidney transplant (V42 0) (Z94 0)   35  Heart palpitations (785 1) (R00 2)   36  Hospital discharge follow-up (V67 59) (Z09)   40  Hyperlipidemia (272 4) (E78 5)   38  Hyperplastic colon polyp (211 3) (K63 5)   39  Hypertension (401 9) (I10)   40  Hyponatremia (276 1) (E87 1)   41  Hypothyroidism (244 9) (E03 9)   42  Increased frequency of urination (788 41) (R35 0)   43  Increased white blood cell count (288 60) (D72 829)   44  Insomnia (780 52) (G47 00)   45  Irritable bowel syndrome (564 1) (K58 9)   46  Major depressive disorder, recurrent episode, in full remission (296 36) (F33 42)   47  Memory loss (780 93) (R41 3)   48  Memory loss or impairment (780 93) (R41 3)   49  Metabolic acidosis (524 6) (E87 2)   50  MGUS (monoclonal gammopathy of unknown significance) (273 1) (D47 2)   51  Neck pain (723 1) (M54 2)   52  Need for influenza vaccination (V04 81) (Z23)   53  Nonrheumatic aortic valve insufficiency (424 1) (I35 1)   54  OAB (overactive bladder) (596 51) (N32 81)   55  Osteopenia (733 90) (M85 80)   56  Osteoporosis (733 00) (M81 0)   57  Other calculus in bladder (594 1) (N21 0)   58  Peripheral vascular disease (443 9) (I73 9)   59  Post traumatic stress disorder (309 81) (F43 10)   60  Preop general physical exam (V72 83) (Z01 818)   61  Preoperative cardiovascular examination (V72 81) (Z01 810)   62  Proteinuria (791 0) (R80 9)   63  Rectal polyp (569 0) (K62 1)   64  Recurrent UTI (599 0) (N39 0)   65  Restless legs syndrome (333 94) (G25 81)   66  Secondary hyperparathyroidism, renal (588 81) (N25 81)   67  Sinus Tachycardia (427 89)   68  Snoring (786 09) (R06 83)   69  SOB (shortness of breath) (786 05) (R06 02)   70  Tremor (781 0) (R25 1)   71  Unsteady gait (781 2) (R26 81)   72  UTI (urinary tract infection) (599 0) (N39 0)    Past Medical History    1  History of Abnormal Liver Function Test (790 6)   2  History of Abnormal urine (791 9) (R82 90)   3  History of Abscess of buttock, right (682 5) (L02 31)   4  History of Acute kidney injury (584 9) (N17 9)   5  History of Acute on chronic diastolic congestive heart failure (428 33,428 0) (I50 33)   6  History of Cervical Dysplasia (V13 22)   7  History of Denial Of Any Significant Medical History   8  History of Diabetes mellitus with foot ulcer (250 80,707 15) (E11 621,L97 509)   9  History of cholelithiasis (V12 79) (Z87 19)   10  History of gastritis (V12 79) (Z87 19)   11  Denied: History of head injury   12  History of hematuria (V13 09) (Z87 448)   13  History of hyperkalemia (V12 29) (Z86 39)   14  History of pneumonia (V12 61) (Z87 01)   15  History of seborrhea (V13 3) (Z87 2)   16  History of urinary tract infection (V13 02) (Z87 440)   17  History of Iliotibial band syndrome (728 89) (M76 30)   18  History of Infected tooth (522 4) (K04 7)   19  History of Lumbar radiculopathy (724 4) (M54 16)   20  History of Nonrheumatic aortic valve insufficiency (424 1) (I35 1)   21  Denied: History of Seizures   22  History of Trochanteric bursitis, unspecified laterality (726 5) (M70 60)   23  History of Urinary Tract Infection (V13 02)   24  History of UTI (urinary tract infection), bacterial (599 0,041 9) (N39 0,A49 9)   25  History of Vaginal itching (698 1) (L29 8)    Surgical History    1  History of Cataract Surgery   2  History of Cholecystectomy   3  History of Complete Colonoscopy   4  History of Complete Colonoscopy   5  History of Diagnostic Esophagogastroduodenoscopy   6   History of Diagnostic Esophagogastroduodenoscopy   7  History of Dilation And Curettage   8  History of Foot Surgery   9  History of Pancreatic Transplantation   10  Renal Transplant   11  History of Surgery Left Foot Amputation    Family History  Mother    1  No family history of mental disorder  Father    2  Family history of cancer (V16 9) (Z80 9)   3  No family history of mental disorder  Sister    4  Family history of depression (V17 0) (Z81 8)  Grandparent    5  Family history of cancer (V16 9) (Z80 9)  Maternal Grandmother    6  Family history of Breast Cancer (V16 3)    Social History    · Denied: History of Alcohol Use (History)   · Former smoker (V15 82) (I06 001)   · History of Uses cocaine    Current Meds    1  Folic Acid 1 MG Oral Tablet; TAKE 1 TABLET DAILY AS DIRECTED; Therapy: 09XTZ2011 to (Evaluate:07Oct2017)  Requested for: 16MTW2628; Last   Rx:12Oct2016 Ordered    2  HydrALAZINE HCl - 50 MG Oral Tablet; TAKE 1 TABLET 3 TIMES DAILY; Therapy: 63NHH4381 to (Evaluate:26Jan2017)  Requested for: 18ZXU4508; Last   Rx:51Tuu5871 Ordered    3  Pantoprazole Sodium 40 MG Oral Tablet Delayed Release; take 1 tablet by mouth every   day; Therapy: 92HVJ7585 to (Evaluate:42Tur8450)  Requested for: 85NVF5562; Last   Rx:58Bjp1321 Ordered    4  Catapres 0 1 MG Oral Tablet (CloNIDine HCl); take 3 tab  in am, 2 tab at noon, 3 tab  at   night; Therapy: 63KVF3771 to  Requested for: 55EJE4325 Recorded   5  Metoprolol Tartrate 50 MG Oral Tablet; take 1 tablet by mouth twice daily; Therapy: 74MIP3526 to (Evaluate:09Jun2017)  Requested for: 77MYF9059; Last   Rx:14Jun2016 Ordered    6  Sodium Bicarbonate 650 MG Oral Tablet; Take one tablet daily; Therapy: 35KYK1932 to (Evaluate:12Apr2016)  Requested for: 01JRZ6724 Recorded    7  AmLODIPine Besylate 10 MG Oral Tablet; take 1/2 tab  BID; Therapy: 95HXN4539 to (Evaluate:30Apr2017)  Requested for: 61OWR3227 Recorded    8   Furosemide 20 MG Oral Tablet; take 1/2 tab  daily; Therapy: 89Zhb1413 to Recorded   9  Magnesium 200 MG Oral Tablet; Therapy: 01GMU4618 to Recorded    10  Clindamycin HCl - 300 MG Oral Capsule; TAKE 2 CAPSULES 1 HOUR PRIOR TO    DENTAL APPOINTMENT; Therapy: 42IJA1104 to (Evaluate:21Oct2016)  Requested for: 62PNJ5385; Last    Rx:20Oct2016 Ordered    11  Lactulose 10 GM/15ML Oral Solution; TAKE 30 ML DAILY  Requested for: 67Txp8637;    Last Rx:21Feb2017 Ordered    12  OneTouch Ultra Blue In [employer]; TEST TWICE A DAY; Therapy: 03WSH7872 to (Evaluate:34Chu5838)  Requested for: 03VJN3159; Last    Rx:10Mar2016 Ordered    13  OneTouch UltraSoft Lancets Miscellaneous; test twice daily; Therapy: 95HJU5261 to (Ben Ervin)  Requested for: 79XYC3586; Last    Rx:09Mar2016 Ordered    14  LORazepam 2 MG Oral Tablet; TAKE 1 TABLET 3 TIMES DAILY AS NEEDED; Therapy: 38MKR1648 to (Evaluate:15May2017)  Requested for: 68CRC9454; Last    Rx:16Nov2016 Ordered    15  DULoxetine HCl - 60 MG Oral Capsule Delayed Release Particles (Cymbalta); TAKE 1    CAPSULE TWICE DAILY; Therapy: 16NTF9715 to (Evaluate:57Dud5715)  Requested for: 00WPI0806; Last    Rx:02Mar2017 Ordered    16  Sertraline HCl - 100 MG Oral Tablet; TAKE 2 TABLETS DAILY; Therapy: 67ITB7894 to (Evaluate:15May2017)  Requested for: 99ATS4529; Last    Rx:16Nov2016 Ordered    17  PredniSONE 5 MG Oral Tablet; TAKE ONE AND 1/2 TABLETS BY MOUTH DAILY; Therapy: 70WRF3057 to (Addis Chandrika)  Requested for: 56EYZ6536; Last    Rx:24May2016 Ordered   18  Tacrolimus 1 MG Oral Capsule (Prograf); Take 4 in the morning and 3 in the evening     Requested for: 30SNN5789; Last Rx:08Feb2017 Ordered    19  Aspirin 81 MG TABS; TAKE 1 TABLET DAILY; Therapy: 95IGJ6505 to (Evaluate:19Jun2016) Recorded    20  Pravastatin Sodium 80 MG Oral Tablet; take 1 tablet by mouth every day; Therapy: 43ERW9861 to (Evaluate:07Glj9001)  Requested for: 18XQM5608; Last    Rx:22Jan2017 Ordered    21   Levothyroxine Sodium 88 MCG Oral Tablet; take 1 tablet by mouth daily; Therapy: 84QNH7800 to (Evaluate:15Mar2017)  Requested for: 28Uwk0555; Last    Rx:16Zdh7992 Ordered    22  TraZODone HCl - 150 MG Oral Tablet; TAKE 1 TABLET AT BEDTIME; Therapy: 52OVR8562 to (Evaluate:88Iwk4489)  Requested for: 03ZGA0622; Last    Rx:02Mar2017 Ordered    23  ARIPiprazole 30 MG Oral Tablet; TAKE 1 TABLET AT BEDTIME; Therapy: 71HQW7752 to (Evaluate:74Cce8267)  Requested for: 38TQS7770; Last    Rx:16Nov2016 Ordered    24  ROPINIRole HCl - 0 25 MG Oral Tablet; take 1 tablet by mouth twice daily; Therapy: 42SCK1882 to (Evaluate:27Dec2016)  Requested for: 24BCD1812; Last    Rx:30Jun2016 Ordered    25  Caltrate 600 TABS; TAKE 1 TABLET TWICE DAILY; Therapy: (Recorded:17Jun2016) to Recorded   26  Doxazosin Mesylate 1 MG Oral Tablet; TAKE 1 TABLET AT BEDTIME; Therapy: (Recorded:17Jun2016) to Recorded   27  VESIcare 5 MG Oral Tablet; take one tablet daily; Therapy: 96Zwm0116 to Recorded   28  Vitamin D3 1000 UNIT Oral Capsule; TAKE 3 PILLS AT NOON BY MOUTH; Therapy: (Recorded:17Jun2016) to Recorded    Allergies    1  Penicillins   2  Morphine Derivatives   3  Duricef TABS   4  Myrbetriq TB24    End of Encounter Meds    1  Folic Acid 1 MG Oral Tablet; TAKE 1 TABLET DAILY AS DIRECTED; Therapy: 43JOS4787 to (Evaluate:07Oct2017)  Requested for: 24NPO3661; Last   Rx:12Oct2016 Ordered    2  HydrALAZINE HCl - 50 MG Oral Tablet; TAKE 1 TABLET 3 TIMES DAILY; Therapy: 17PXD2418 to (Evaluate:26Jan2017)  Requested for: 05LNU2738; Last   Rx:22Bar4611 Ordered    3  Pantoprazole Sodium 40 MG Oral Tablet Delayed Release; take 1 tablet by mouth every   day; Therapy: 09URW9644 to (Evaluate:42Jtx5448)  Requested for: 63RPG0290; Last   Rx:91Eqf1003 Ordered    4  Catapres 0 1 MG Oral Tablet (CloNIDine HCl); take 3 tab  in am, 2 tab at noon, 3 tab  at   night; Therapy: 89QEU2230 to  Requested for: 38RPZ6299 Recorded   5   Metoprolol Tartrate 50 MG Oral Tablet; take 1 tablet by mouth twice daily; Therapy: 07IQD0022 to (Evaluate:09Jun2017)  Requested for: 02MFB7572; Last   Rx:14Jun2016 Ordered    6  Sodium Bicarbonate 650 MG Oral Tablet; Take one tablet daily; Therapy: 69SXP7388 to (Evaluate:12Apr2016)  Requested for: 16AEU9345 Recorded    7  AmLODIPine Besylate 10 MG Oral Tablet; take 1/2 tab  BID; Therapy: 38MOX4336 to (Evaluate:30Apr2017)  Requested for: 10ALA9247 Recorded    8  Furosemide 20 MG Oral Tablet; take 1/2 tab  daily; Therapy: 61Tuv4327 to Recorded   9  Magnesium 200 MG Oral Tablet; Therapy: 04GOY7504 to Recorded    10  Clindamycin HCl - 300 MG Oral Capsule; TAKE 2 CAPSULES 1 HOUR PRIOR TO    DENTAL APPOINTMENT; Therapy: 82SIK4904 to (Evaluate:21Oct2016)  Requested for: 66UGP8819; Last    Rx:20Oct2016 Ordered    11  Lactulose 10 GM/15ML Oral Solution; TAKE 30 ML DAILY  Requested for: 47Hig5773;    Last Rx:21Feb2017 Ordered    12  OneTouch Ultra Blue In [employer]; TEST TWICE A DAY; Therapy: 23ZTP0495 to (Evaluate:85Zvc6980)  Requested for: 69RZC4325; Last    Rx:10Mar2016 Ordered    13  OneTouch UltraSoft Lancets Miscellaneous; test twice daily; Therapy: 48LMK3707 to (Pawan Lennon)  Requested for: 96YEW6745; Last    Rx:09Mar2016 Ordered    14  LORazepam 2 MG Oral Tablet; TAKE 1 TABLET 3 TIMES DAILY AS NEEDED; Therapy: 35IHN0777 to (Evaluate:15May2017)  Requested for: 41BVL7460; Last    Rx:16Nov2016 Ordered    15  DULoxetine HCl - 60 MG Oral Capsule Delayed Release Particles (Cymbalta); TAKE 1    CAPSULE TWICE DAILY; Therapy: 89EAW9646 to (Evaluate:64Zab1004)  Requested for: 24YLS1310; Last    Rx:02Mar2017 Ordered    16  Sertraline HCl - 100 MG Oral Tablet; TAKE 2 TABLETS DAILY; Therapy: 69YEG5964 to (Evaluate:15May2017)  Requested for: 99YTK9063; Last    Rx:16Nov2016 Ordered    17  PredniSONE 5 MG Oral Tablet; TAKE ONE AND 1/2 TABLETS BY MOUTH DAILY;     Therapy: 56MQT3542 to (Evaluate:20Nov2016)  Requested for: 27IVF1389; Last    EZ:19LBE1734 Ordered   18  Tacrolimus 1 MG Oral Capsule (Prograf); Take 4 in the morning and 3 in the evening     Requested for: 37WRB1120; Last Rx:95Fvv6663 Ordered    19  Aspirin 81 MG TABS; TAKE 1 TABLET DAILY; Therapy: 35SXT2739 to (Evaluate:19Jun2016) Recorded    20  Pravastatin Sodium 80 MG Oral Tablet; take 1 tablet by mouth every day; Therapy: 80GOL4202 to (Evaluate:85Hhl2291)  Requested for: 07PQP1304; Last    Rx:22Jan2017 Ordered    21  Levothyroxine Sodium 88 MCG Oral Tablet; take 1 tablet by mouth daily; Therapy: 57WDD0046 to (Evaluate:15Mar2017)  Requested for: 77Nze1440; Last    Rx:20Lsr5609 Ordered    22  TraZODone HCl - 150 MG Oral Tablet; TAKE 1 TABLET AT BEDTIME; Therapy: 25XMA0909 to (Evaluate:44Xfi0267)  Requested for: 75GSM8193; Last    Rx:02Mar2017 Ordered    23  ARIPiprazole 30 MG Oral Tablet; TAKE 1 TABLET AT BEDTIME; Therapy: 48LIO5791 to (Evaluate:89Unx4283)  Requested for: 34YIU6337; Last    Rx:16Nov2016 Ordered    24  ROPINIRole HCl - 0 25 MG Oral Tablet; take 1 tablet by mouth twice daily; Therapy: 27EOH8823 to (Evaluate:00Wku6265)  Requested for: 72AEH3162; Last    Rx:30Jun2016 Ordered    25  Caltrate 600 TABS; TAKE 1 TABLET TWICE DAILY; Therapy: (Recorded:17Jun2016) to Recorded   26  Doxazosin Mesylate 1 MG Oral Tablet; TAKE 1 TABLET AT BEDTIME; Therapy: (Recorded:17Jun2016) to Recorded   27  VESIcare 5 MG Oral Tablet; take one tablet daily; Therapy: 28Xbj2373 to Recorded   28  Vitamin D3 1000 UNIT Oral Capsule; TAKE 3 PILLS AT NOON BY MOUTH; Therapy: (Recorded:17Jun2016) to Recorded    Future Appointments    Date/Time Provider Specialty Site   06/02/2017 10:00 AM [facility]   05/05/2017 02:00 PM EDGAR Dimas  Psychiatry [facility]   04/05/2017 10:40 AM EDGAR Woody   Nephrology [facility] [address]   04/20/2017 01:20 PM Galdino Carreno, EDGAR Canela  Cardiology [facility]     Patient Care Team    Care Team Member Role Specialty Office Number   Marisol Barrosolauren BHATT    Nephrology [phone]   Mateo Quiñones MD  Dermatology [phone]   Regina Barker DPM   [phone]   [facility]  Urology [phone]   Alyssa Mendez MD  Psychiatry [phone] [address] [facility] [phone]   [facility] [address]  Nephrology [phone]     Signatures   Electronically signed by : Noa Corado RN; Mar  3 2017 11:26AM EST                       (Author)

## 2018-01-12 NOTE — MISCELLANEOUS
Message  Patient called today with the following weights:  2/29/16 - 212 8  3/1/16 - 212 6  3/2/16 - 212 8  3/3/16 - 213 0  3/4/16 - 211 2  3/5/16 - 209  3/6/16 - 210  kja      Active Problems    1  Abnormal EKG (794 31) (R94 31)   2  Acid reflux disease (530 81) (K21 9)   3  Acute on chronic diastolic congestive heart failure (428 33,428 0) (I50 33)   4  Anemia (285 9) (D64 9)   5  Atrial fibrillation (427 31) (I48 91)   6  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)   7  Benign hypertensive CKD (403 10) (I12 9)   8  Bilateral leg edema (782 3) (R60 0)   9  Bruit of left carotid artery (785 9) (R09 89)   10  Cataract (366 9) (H26 9)   11  Chronic diastolic congestive heart failure (428 32,428 0) (I50 32)   12  Chronic kidney disease, stage 3 (585 3) (N18 3)   13  Cocaine abuse in remission (305 63) (F14 10)   14  Cognitive changes (799 59) (R41 89)   15  Constipation (564 00) (K59 00)   16  Diabetic Gastropathy (537 9)   17  Diabetic Nephropathy (583 81)   18  Diabetic polyneuropathy (250 60,357 2) (E11 42)   19  Diabetic retinopathy (250 50,362 01) (E11 319)   20  Diabetic Ulcer Of The Left Foot (250 80)   21  ROD (dyspnea on exertion) (786 09) (R06 09)   22  Drug-induced Essential Tremor (333 1)   23  Encounter for screening mammogram for breast cancer (V76 12) (Z12 31)   24  Esophagitis, reflux (530 11) (K21 0)   25  External Hemorrhoids (455 3)   26  FELIPE (generalized anxiety disorder) (300 02) (F41 1)   27  H/O kidney transplant (V42 0) (Z94 0)   28  Heart palpitations (785 1) (R00 2)   29  Hyperlipidemia (272 4) (E78 5)   30  Hyperplastic colon polyp (211 3) (K63 5)   31  Hypertension (401 9) (I10)   32  Hypothyroidism (244 9) (E03 9)   33  Increased white blood cell count (288 60) (D72 829)   34  Insomnia (780 52) (G47 00)   35  Irritable bowel syndrome (564 1) (K58 9)   36  Major depressive disorder, recurrent episode, in full remission (296 36) (F33 42)   37  Memory loss (780 93) (R41 3)   38   Metabolic acidosis (276 2) (E87 2)   39  MGUS (monoclonal gammopathy of unknown significance) (273 1) (D47 2)   40  Neck pain (723 1) (M54 2)   41  Nonrheumatic aortic valve insufficiency (424 1) (I35 1)   42  Osteopenia (733 90) (M85 80)   43  Osteoporosis (733 00) (M81 0)   44  Peripheral vascular disease (443 9) (I73 9)   45  Post traumatic stress disorder (309 81) (F43 10)   46  Proteinuria (791 0) (R80 9)   47  Rectal polyp (569 0) (K62 1)   48  Restless legs syndrome (333 94) (G25 81)   49  Secondary hyperparathyroidism, renal (588 81) (N25 81)   50  Sinus Tachycardia (427 89)   51  SOB (shortness of breath) (786 05) (R06 02)   52  Tremor (781 0) (R25 1)   53  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 (514 05) (K81 63)    Current Meds   1  AmLODIPine Besylate 10 MG Oral Tablet; take 1/2 tab  BID; Therapy: 44FEJ1455 to (Evaluate:98Rru7198)  Requested for: 81Tbu6349 Recorded   2  ARIPiprazole 20 MG Oral Tablet; TAKE 1 TABLET AT BEDTIME; Therapy: 19XAJ6821 to (Evaluate:53Yax6398)  Requested for: 32KNE6881; Last   Rx:18Feb2016 Ordered   3  Aspirin 81 MG Oral Tablet; TAKE 1 TABLET DAILY; Therapy: 93MZV9694 to (Evaluate:19Jun2016) Recorded   4  Carafate 1 GM/10ML Oral Suspension; Therapy: (0699 982 13 20) to Recorded   5  Citracal TABS; Take one tablet twice daily Recorded   6  CloNIDine HCl - 0 1 MG Oral Tablet; Take three tablets every morning, two tablets every   noon, and three tablets every evening; Therapy: 76DFQ8128 to (Evaluate:20Kap4938)  Requested for: 84DUK2515; Last   Rx:89Vva8951 Ordered   7  DULoxetine HCl - 60 MG Oral Capsule Delayed Release Particles (Cymbalta); TAKE 1   CAPSULE TWICE DAILY; Therapy: 31HIF5119 to (Evaluate:52Nfp2894)  Requested for: 88SWE0261; Last   Rx:37Thc7228 Ordered   8  Folic Acid 1 MG Oral Tablet; TAKE 1 TABLET DAILY AS DIRECTED; Therapy: 37OMP8068 to (Evaluate:74Jec5387)  Requested for: 92KTK6299; Last   Rx:21Ykw9371 Ordered   9   Furosemide 40 MG Oral Tablet; Take one tablet every morning and 1/2 tablet every   evening; Therapy: 48MDV9906 to (Ryland Flash)  Requested for: 69ARW1122 Recorded   10  Generlac 10 GM/15ML SYRP Recorded   11  HydrALAZINE HCl - 50 MG Oral Tablet; TAKE 1 TABLET 3 TIMES DAILY; Therapy: 56NCQ9044 to (Evaluate:26Jan2017)  Requested for: 00QIP1932; Last    Rx:35Irh7836 Ordered   12  Hydrocodone-Acetaminophen 5-325 MG Oral Tablet; TAKE 1 TABLET EVERY 6 HOURS    AS NEEDED FOR PAIN;    Therapy: 27NGO1139 to (Evaluate:07Jan2016); Last Rx:21Kyx8829 Ordered   13  Levothyroxine Sodium 88 MCG Oral Tablet (Synthroid); take 1 tablet every day; Therapy: 39AMB3916 to (Last Rx:02Feb2016)  Requested for: 02Feb2016 Ordered   14  LORazepam 2 MG Oral Tablet; TAKE 1 TABLET TWICE DAILY AS NEEDED; Therapy: 11PZD2445 to (Evaluate:73Vzj7866); Last Rx:11Feb2016 Ordered   15  Magnesium  (64 Mg) MG TBCR; TAKE 1 TABLET DAILY; Therapy: 81ICI2328 to (Evaluate:02Dec2013); Last Rx:05Jun2013 Ordered   16  Metoprolol Tartrate 50 MG Oral Tablet; take 1 tablet by mouth twice a day; Therapy: 31SNL1652 to (Vinetta Race)  Requested for: 96LUV2350; Last    Rx:26Jan2016 Ordered   17  OneTouch Ultra Blue In [employer] Strip; TEST EVERY DAY AND AS NEEDED; Therapy: 29GMD1545 to (Evaluate:19Apr2016)  Requested for: 04Hpv9217; Last    Rx:75Est9301 Ordered   18  Pantoprazole Sodium 40 MG Oral Tablet Delayed Release; TAKE 1 TABLET DAILY; Therapy: 06BXT8043 to (Evaluate:02Apr2016)  Requested for: 06XPY7463; Last    Rx:05Oct2015 Ordered   19  Pravastatin Sodium 80 MG Oral Tablet; take 1 tablet by mouth every day; Therapy: 15QAW4914 to (Evaluate:92Wop2861)  Requested for: 64Tku8562; Last    Rx:19Apr2015 Ordered   20  PredniSONE 5 MG Oral Tablet; Take 1-1/2 tablets daily; Therapy: 69EDA4126 to (Evaluate:14Mar2016)  Requested for: 20Mar2015; Last    Rx:20Mar2015 Ordered   21   ROPINIRole HCl - 0 25 MG Oral Tablet; take 1 tablet by mouth twice daily; Therapy: 75AES5227 to (Evaluate:40Rle0579)  Requested for: 29FNA7995; Last    Rx:68Zxe8598 Ordered   22  Sertraline HCl - 100 MG Oral Tablet; TAKE 2 TABLETS DAILY; Therapy: 37JVU4603 to (Evaluate:06Ebn9344)  Requested for: 03NMG1066; Last    Rx:11Feb2016 Ordered   23  Sodium Bicarbonate 650 MG Oral Tablet; Take one tablet daily; Therapy: 86DNF1878 to (Evaluate:12Apr2016)  Requested for: 12KME7214 Recorded   24  Tacrolimus 1 MG Oral Capsule; TAKE 4 CAPSULES every morning and 3 capsules every    evening; Therapy: 45ZMN5182 to (Evaluate:62Gij0270)  Requested for: 65SEP6695; Last    Rx:04Mar2016 Ordered   25  TraZODone HCl - 150 MG Oral Tablet; TAKE 1 TABLET AT BEDTIME; Therapy: 90CRO3856 to (Evaluate:34Jnn7756)  Requested for: 93NKY0127; Last    Rx:11Feb2016 Ordered   26  Vitamin D3 3000 UNIT Oral Tablet; 1 TAB DAILY; Therapy: 16NMQ9656 to (Evaluate:93Pfi5766) Recorded    Allergies    1  Penicillins   2  Morphine Derivatives   3   Letta Kill    Signatures   Electronically signed by : EDGAR Lopez ; Mar  7 2016  3:27PM EST

## 2018-01-12 NOTE — RESULT NOTES
Message   Call patient  Check if patient takes Levothyroxine 88 mcg daily for Hypothyroidism daily before breakfast    TSH level is borderline elevated  If she takes medication daily in a m  make sure that she does not take any vitamins or supplements within 6 hr  after taking thyroid medication  Recheck TSH level in 6-8 weeks  mail sript for blood test to patient  Verified Results  (1) T4, FREE [id number] 07:05AM [facility]     Test Name Result Flag Reference   T4,FREE 0 91 ng/dL  0 76-1 46     (1) TSH 49Obv6806 07:05AM [facility]   Patients undergoing fluorescein dye angiography may retain small amounts of fluorescein in the body for 48-72 hours post procedure  Samples containing fluorescein can produce falsely depressed TSH values  If the patient had this procedure,a specimen should be resubmitted post fluorescein clearance  The recommended reference ranges for TSH during pregnancy are as follows:  First trimester 0 1 to 2 5 uIU/mL  Second trimester  0 2 to 3 0 uIU/mL  Third trimester 0 3 to 3 0 uIU/m     Test Name Result Flag Reference   TSH 4 470 uIU/mL H 0 358-3 740       Plan  Hypothyroidism    · (1) TSH; Status:Active;  Requested RKI:43NXS7560;

## 2018-01-12 NOTE — MISCELLANEOUS
Message   Recorded as Task   Date: 11/18/2016 11:43 AM, Created By: Nargis Gil   Task Name: Miscellaneous   Assigned To: Dede Garcia   Regarding Patient: Alisa Islas, Status: In Progress   MattTony Nguyen - 18 Nov 2016 11:43 AM     TASK CREATED  increase Prograf to 4mg am and 3mg pm and repeat Prograf level in 1 week and 2 weeks   Ana Maria Stark - 18 Nov 2016 11:45 AM     TASK IN PROGRESS   Prograf has been increased to 4 mg in the am and 3 mg in the pm  A repeat prograf level has been ordered in 1 week and a repeat level in 2 weeks  [facility] 11/18/2016      Active Problems    1  Abnormal EKG (794 31) (R94 31)   2  Acid reflux disease (530 81) (K21 9)   3  Allergic urticaria (708 0) (L50 0)   4  Anemia (285 9) (D64 9)   5  Antibiotic prophylaxis for dental procedure indicated due to prior joint replacement   (V58 62) (Z79 2)   6  Atrial fibrillation (427 31) (I48 91)   7  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)   8  Benign hypertensive CKD (403 10) (I12 9)   9  Bilateral leg edema (782 3) (R60 0)   10  Bruit of left carotid artery (785 9) (R09 89)   11  Cataract (366 9) (H26 9)   12  Chronic diastolic congestive heart failure (428 32,428 0) (I50 32)   13  Chronic kidney disease, stage 4 (severe) (585 4) (N18 4)   14  Cocaine abuse in remission (305 63) (F14 10)   15  Cognitive changes (799 59) (R41 89)   16  Constipation (564 00) (K59 00)   17  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, without long-term    current use of insulin (250 61) (E10 49)   18  Diabetes mellitus with nephropathy (250 40,583 81) (E11 21)   19  Diabetic Gastropathy (537 9)   20  Diabetic polyneuropathy (250 60,357 2) (E11 42)   21  Diabetic retinopathy (250 50,362 01) (E11 319)   22  Diabetic Ulcer Of The Left Foot (250 80)   23  Diastolic dysfunction (048 2) (I51 9)   24  ROD (dyspnea on exertion) (786 09) (R06 09)   25  Drug-induced Essential Tremor (333 1)   26  Dysuria (788 1) (R30 0)   27  Encephalopathy (348 30) (G93 40)   28  Encounter for screening mammogram for breast cancer (V76 12) (Z12 31)   29  Esophagitis, reflux (530 11) (K21 0)   30  External Hemorrhoids (455 3)   31  FELIPE (generalized anxiety disorder) (300 02) (F41 1)   32  H/O kidney transplant (V42 0) (Z94 0)   33  Heart palpitations (785 1) (R00 2)   34  Hospital discharge follow-up (V67 59) (Z09)   35  Hyperlipidemia (272 4) (E78 5)   36  Hyperplastic colon polyp (211 3) (K63 5)   37  Hypertension (401 9) (I10)   38  Hypothyroidism (244 9) (E03 9)   39  Increased frequency of urination (788 41) (R35 0)   40  Increased white blood cell count (288 60) (D72 829)   41  Insomnia (780 52) (G47 00)   42  Irritable bowel syndrome (564 1) (K58 9)   43  Major depressive disorder, recurrent, moderate (296 32) (F33 1)   44  Memory loss (780 93) (R41 3)   45  Memory loss or impairment (780 93) (R41 3)   46  Metabolic acidosis (075 8) (E87 2)   47  MGUS (monoclonal gammopathy of unknown significance) (273 1) (D47 2)   48  Neck pain (723 1) (M54 2)   49  Need for influenza vaccination (V04 81) (Z23)   50  Nonrheumatic aortic valve insufficiency (424 1) (I35 1)   51  OAB (overactive bladder) (596 51) (N32 81)   52  Osteopenia (733 90) (M85 80)   53  Osteoporosis (733 00) (M81 0)   54  Other calculus in bladder (594 1) (N21 0)   55  Peripheral vascular disease (443 9) (I73 9)   56  Post traumatic stress disorder (309 81) (F43 10)   57  Preop general physical exam (V72 83) (Z01 818)   58  Preoperative cardiovascular examination (V72 81) (Z01 810)   59  Proteinuria (791 0) (R80 9)   60  Rectal polyp (569 0) (K62 1)   61  Recurrent UTI (599 0) (N39 0)   62  Restless legs syndrome (333 94) (G25 81)   63  Secondary hyperparathyroidism, renal (588 81) (N25 81)   64  Sinus Tachycardia (427 89)   65  SOB (shortness of breath) (786 05) (R06 02)   66  Tremor (781 0) (R25 1)   67  Unsteady gait (781 2) (R26 81)    Current Meds   1   AmLODIPine Besylate 10 MG Oral Tablet; take 1/2 tab  BID; Therapy: 28DHV7710 to (Evaluate:30Apr2017)  Requested for: 11OQQ8642 Recorded   2  ARIPiprazole 30 MG Oral Tablet; TAKE 1 TABLET AT BEDTIME; Therapy: 54GWE4970 to (Evaluate:15May2017)  Requested for: 32XGU2264; Last   Rx:16Nov2016 Ordered   3  Aspirin 81 MG TABS; TAKE 1 TABLET DAILY; Therapy: 81YQF2635 to (Evaluate:19Jun2016) Recorded   4  Caltrate 600 TABS; TAKE 1 TABLET TWICE DAILY; Therapy: (Recorded:17Jun2016) to Recorded   5  Catapres 0 1 MG Oral Tablet (CloNIDine HCl); take 3 tab  in am, 2 tab at noon, 3 tab  at   night; Therapy: 71ZRB7832 to  Requested for: 00NIK8188 Recorded   6  Clindamycin HCl - 300 MG Oral Capsule; TAKE 2 CAPSULES 1 HOUR PRIOR TO   DENTAL APPOINTMENT; Therapy: 21VFY6806 to (Evaluate:21Oct2016)  Requested for: 85XEW1247; Last   Rx:20Oct2016 Ordered   7  Doxazosin Mesylate 1 MG Oral Tablet; TAKE 1 TABLET AT BEDTIME; Therapy: (Recorded:17Jun2016) to Recorded   8  DULoxetine HCl - 60 MG Oral Capsule Delayed Release Particles (Cymbalta); TAKE 1   CAPSULE TWICE DAILY; Therapy: 10MWP2112 to (Evaluate:07Feb2017)  Requested for: 19YWM1664; Last   Rx:09Nov2016 Ordered   9  Folic Acid 1 MG Oral Tablet; TAKE 1 TABLET DAILY AS DIRECTED; Therapy: 91SMH6803 to (Evaluate:07Oct2017)  Requested for: 29NAG4850; Last   Rx:12Oct2016 Ordered   10  Furosemide 40 MG Oral Tablet; TAKE 1 TO 2 TABLETS DAILY AS NEEDED FOR    EDEMA; Therapy: 72LNR6727 to (Evaluate:25Jun2017)  Requested for: 43ROL4999; Last    Rx:30Jun2016 Ordered   11  HydrALAZINE HCl - 50 MG Oral Tablet; TAKE 1 TABLET 3 TIMES DAILY; Therapy: 50ZIS2783 to (Evaluate:26Jan2017)  Requested for: 78PAM5819; Last    Rx:01Feb2016 Ordered   12  Lactulose 10 GM/15ML Oral Solution; TAKE 30 ML DAILY; Therapy: (Recorded:17Jun2016) to Recorded   13  Levothyroxine Sodium 88 MCG Oral Tablet; take 1 tablet by mouth daily;     Therapy: 53UWL6012 to (Evaluate:15Mar2017)  Requested for: 02DBE1826; Last Rx:53Tio3164 Ordered   14  LORazepam 2 MG Oral Tablet; TAKE 1 TABLET 3 TIMES DAILY AS NEEDED; Therapy: 01BWX4652 to (Evaluate:66Qti4454)  Requested for: 34BDK5902; Last    Rx:80Cxu2868 Ordered   15  Magnesium 200 MG Oral Tablet; Therapy: 00BYB3867 to Recorded   16  Magnesium TABS; Therapy: (Recorded:26Ams4673) to Recorded   17  Metoprolol Tartrate 50 MG Oral Tablet; take 1 tablet by mouth twice daily; Therapy: 27OJG7328 to (Evaluate:09Jun2017)  Requested for: 41USB0866; Last    Rx:14Jun2016 Ordered   18  OneTouch Ultra Blue In [employer]; TEST TWICE A DAY; Therapy: 32TSG2690 to (Evaluate:11Rzf7519)  Requested for: 45PDO7945; Last    Rx:10Mar2016 Ordered   19  OneTouch UltraSoft Lancets Miscellaneous; test twice daily; Therapy: 86AWR2439 to (Erik Shillings)  Requested for: 44MSH1008; Last    Rx:09Mar2016 Ordered   20  Pantoprazole Sodium 40 MG Oral Tablet Delayed Release; take 1 tablet by mouth every    day; Therapy: 06BXR2563 to (Evaluate:23Uhn4433)  Requested for: 23CCY7758; Last    Rx:54Kau4609 Ordered   21  Pravastatin Sodium 80 MG Oral Tablet; take 1 tablet by mouth every day; Therapy: 76VMC8720 to (Leora Trivedi)  Requested for: 26XPE0651; Last    XW:48ZWQ7114 Ordered   22  PredniSONE 5 MG Oral Tablet; TAKE ONE AND 1/2 TABLETS BY MOUTH DAILY; Therapy: 98KLE4518 to (Mirlande Micki)  Requested for: 60OZB1376; Last    Rx:80Ukf5107 Ordered   23  Prograf 1 MG Oral Capsule (Tacrolimus); Take 4 in the morning and 3 in the evening; Therapy: (Recorded:28Qfh0288) to Recorded   24  ROPINIRole HCl - 0 25 MG Oral Tablet; take 1 tablet by mouth twice daily; Therapy: 12ITO0553 to (Evaluate:77Uxl4573)  Requested for: 75KLA4963; Last    Rx:48Qnc3665 Ordered   25  Sertraline HCl - 100 MG Oral Tablet; TAKE 2 TABLETS DAILY; Therapy: 93SAS8404 to (Evaluate:04Ryq1763)  Requested for: 46IUL5300; Last    Rx:16Nov2016 Ordered   26  Sodium Bicarbonate 650 MG Oral Tablet;  Take one tablet daily; Therapy: 98CYE6021 to (Evaluate:12Apr2016)  Requested for: 01BDH8739 Recorded   27  TraZODone HCl - 150 MG Oral Tablet; TAKE 1 TABLET AT BEDTIME; Therapy: 91PTH0197 to (Evaluate:60Skw9482)  Requested for: 71NML6725; Last    Rx:09Nov2016 Ordered   28  Vitamin D3 1000 UNIT Oral Capsule; TAKE 3 PILLS AT NOON BY MOUTH; Therapy: (Recorded:17Jun2016) to Recorded    Allergies    1  Penicillins   2  Morphine Derivatives   3  Duricef TABS   4  Myrbetriq TB24    Plan  H/O kidney transplant    · (1)  TACROLIMUS; Status:Active - Retrospective By Protocol Authorization;   Requested for:08Boc8580;     Signatures   Electronically signed by : EDGAR Huitron ; Nov 20 2016  7:29PM EST

## 2018-01-12 NOTE — MISCELLANEOUS
Message   Recorded as Task   Date: 09/20/2016 11:52 AM, Created By: Ban Mcginnis   Task Name: Miscellaneous   Assigned To: Julissa Boyd   Regarding Patient: Torres Hernandez, Status: Active   Comment:    Ana Maria Stark - 20 Sep 2016 11:52 AM     TASK CREATED  Pt mom dropped by with questions re pt's tacrolimus medication, she states that upon d/c from [facility] she was infromed that the pt should take 1mg of tacrolimus BID however since pt recieved kidney transplant her dosage has been 1mg 4 in the morning and 3 in the evening  Per Dr Mercy De Luna request the Tacrolimus has been changed back to the original [facility]) has been notified of the change and a Tacrolimus level has been ordered for tomorrow monring prior to dosing and 1 week after  Lab alips have been faxed to [facility] 9/20/2016     Signatures   Electronically signed by : EDGAR Maldonado ; Sep 20 2016  1:11PM EST                       (Author)

## 2018-01-12 NOTE — PROGRESS NOTES
Medical Alert: Diabetes    Heart Condition    Heart Murmur    High Blood Pressure    Kidney Disease    Latex Allergy    *Pre-Med    Organ Transplant    anemia    Psychiatric problems  Medications: LACTULOSE 10 GM/15 ML SOLUTION    Trazodone HCl    Clonidine    Sodium bicarbonate    SERTRALINE  MG TABLET    Lorazepam    ASPIR EC 81 MG TABLET 81 MG    Lasix 40 MG Oral Tablet    OTHER  Allergies:      Penicillin    Latex    Morphine  Since Last Visit: Medical Alert: No Change    Medications: No Change    Allergies:        No Change  Pain Scale Type: Numeric Pain ScalePain Level: 0  Description:    Patient Health Assessment    Date:            08/18/2017  Blood Pressure:  162/64  Pulse:           83  Age:             52  Weight:          215 lbs  Height/Length:   5' 7"  Body Mass Index: 33 7  Provider:        [email or web address]  Clinic:          [facility]      Pt presents for periodic exam and prophy  Patient took premedication 1 hour  before appt  Reviewed PMH, hx of  pancreatic and kidney transplant  Last  week found out that 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 and she now has  Type 1 Diabetes  Now on insulin  Patient took an extra insulin injection this  morning due to high blood sugar and came in very hypo  She ate candy, I gave  her soda  Waited 15 minutes before she felt better  Gave the patient the option   to reschedule appt and she wanted to continue with what was planned for today  Completed oral cancer screening, no abnormal findings  Completed restorative  exam and perio spot probing  #7 F, #8 F recurrent caries  b  No PD âº4, minimal   BOP, desquamative gingiva on the lower anteriors  Pt tx planned for prophy  Prophy completed with ultrasonic  and hand instrumentation  Heavy  staining on teth  Soft plaque removed and supragingival calculus removed from  mandibular anterior lingual  Polished with prophy cup and paste  Flossed and  provided OHI   Informed patient that since she is now diabetic, in addition to  candy I want her to carry around a toothbrush and any time she eats candy to  brush her teeth  Demonstrated proper brushing and flossing technique  Pt left  satisfied and ambulatory      NV: Resins #7, #8  NNV: possible implant bridge #27-#29    JANELL/ Dr Mercedes Garza    ----- Signed on Friday, August 18, 2017 at 1:02:09 PM  -----  ----- Provider: [email or web address] - Resident Three, Dentist -- Clinic: [facility]  -----

## 2018-01-12 NOTE — RESULT NOTES
Message   UA reviewed  will see if culture is being run on this  Also, will review with patient and see if patient is having any symptoms  low threshold to treat as real infection           Verified Results  (1) URINALYSIS (will reflex a microscopy if leukocytes, occult blood, protein or nitrites are not within normal limits) 00Bvr3883 07:26AM N[facility] Fortune    Order Number: [id number]     Test Name Result Flag Reference   COLOR Yellow     CLARITY Cloudy     SPECIFIC GRAVITY UA 1 011  1 003-1 030   PH UA 7 0  4 5-8 0   LEUKOCYTE ESTERASE UA Large A Negative   NITRITE UA Positive A Negative   PROTEIN UA 30 (1+) mg/dl A Negative   GLUCOSE UA Negative mg/dl  Negative   KETONES UA Negative mg/dl  Negative   UROBILINOGEN UA 0 2 E U /dl  0 2, 1 0 E U /dl   BILIRUBIN UA Negative  Negative   BLOOD UA Negative  Negative   BACTERIA Innumerable /hpf A None Seen, Occasional   EPITHELIAL CELLS None Seen /hpf  None Seen, Occasional   HYALINE CASTS None Seen /lpf  None Seen   RBC UA None Seen /hpf  None Seen   WBC UA 30-50 /hpf A None Seen

## 2018-01-12 NOTE — MISCELLANEOUS
Message   Recorded as Task   Date: 02/26/2016 01:34 PM, Created By: Tin Barr   Task Name: Miscellaneous   Assigned To: [facility] Radha Perez   Regarding Patient: Julienne Webb, Status: Active   Jay Bartlett - 26 Feb 2016 1:34 PM     TASK CREATED  based on BP log sent in, no changes in BP meds for now   I spoke with the patient and she is aware  kja      Active Problems    1  Abnormal EKG (794 31) (R94 31)   2  Acid reflux disease (530 81) (K21 9)   3  Acute on chronic diastolic congestive heart failure (428 33,428 0) (I50 33)   4  Anemia (285 9) (D64 9)   5  Atrial fibrillation (427 31) (I48 91)   6  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)   7  Benign hypertensive CKD (403 10) (I12 9)   8  Bilateral leg edema (782 3) (R60 0)   9  Bruit of left carotid artery (785 9) (R09 89)   10  Cataract (366 9) (H26 9)   11  Chronic diastolic congestive heart failure (428 32,428 0) (I50 32)   12  Chronic kidney disease, stage 3 (585 3) (N18 3)   13  Cocaine abuse in remission (305 63) (F14 10)   14  Cognitive changes (799 59) (R41 89)   15  Constipation (564 00) (K59 00)   16  Diabetic Gastropathy (537 9)   17  Diabetic Nephropathy (583 81)   18  Diabetic polyneuropathy (250 60,357 2) (E11 42)   19  Diabetic retinopathy (250 50,362 01) (E11 319)   20  Diabetic Ulcer Of The Left Foot (250 80)   21  ROD (dyspnea on exertion) (786 09) (R06 09)   22  Drug-induced Essential Tremor (333 1)   23  Encounter for screening mammogram for breast cancer (V76 12) (Z12 31)   24  Esophagitis, reflux (530 11) (K21 0)   25  External Hemorrhoids (455 3)   26  FELIPE (generalized anxiety disorder) (300 02) (F41 1)   27  H/O kidney transplant (V42 0) (Z94 0)   28  Heart palpitations (785 1) (R00 2)   29  Hyperlipidemia (272 4) (E78 5)   30  Hyperplastic colon polyp (211 3) (K63 5)   31  Hypertension (401 9) (I10)   32  Hypothyroidism (244 9) (E03 9)   33  Increased white blood cell count (288 60) (D72 829)   34   Insomnia (780 52) (G47 00)   35  Irritable bowel syndrome (564 1) (K58 9)   36  Major depressive disorder, recurrent episode, in full remission (296 36) (F33 42)   37  Memory loss (780 93) (R41 3)   38  Metabolic acidosis (930 3) (E87 2)   39  MGUS (monoclonal gammopathy of unknown significance) (273 1) (D47 2)   40  Neck pain (723 1) (M54 2)   41  Nonrheumatic aortic valve insufficiency (424 1) (I35 1)   42  Osteopenia (733 90) (M85 80)   43  Osteoporosis (733 00) (M81 0)   44  Peripheral vascular disease (443 9) (I73 9)   45  Post traumatic stress disorder (309 81) (F43 10)   46  Proteinuria (791 0) (R80 9)   47  Rectal polyp (569 0) (K62 1)   48  Restless legs syndrome (333 94) (G25 81)   49  Secondary hyperparathyroidism, renal (588 81) (N25 81)   50  Sinus Tachycardia (427 89)   51  SOB (shortness of breath) (786 05) (R06 02)   52  Tremor (781 0) (R25 1)   53  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 (284 87) (R08 81)    Current Meds   1  AmLODIPine Besylate 10 MG Oral Tablet; take 1/2 tab  BID; Therapy: 96LSN3663 to (Evaluate:36Zrf1547)  Requested for: 31Xwm0785 Recorded   2  ARIPiprazole 20 MG Oral Tablet; TAKE 1 TABLET AT BEDTIME; Therapy: 06VHK6511 to (Evaluate:17Ori8581)  Requested for: 07XTK8060; Last   Rx:45Qdb1253 Ordered   3  Aspirin 81 MG Oral Tablet; TAKE 1 TABLET DAILY; Therapy: 80BBE3307 to (Evaluate:19Jun2016) Recorded   4  Carafate 1 GM/10ML Oral Suspension; Therapy: (0699 982 13 20) to Recorded   5  Citracal TABS; Take one tablet twice daily Recorded   6  CloNIDine HCl - 0 1 MG Oral Tablet; Take three tablets every morning, two tablets every   noon, and three tablets every evening; Therapy: 48XMU4117 to (Evaluate:84Mbc3695)  Requested for: 13PHX1726; Last   Rx:63Fli4288 Ordered   7  DULoxetine HCl - 60 MG Oral Capsule Delayed Release Particles (Cymbalta); TAKE 1   CAPSULE TWICE DAILY;    Therapy: 30JCP1722 to (Evaluate:37Agd2059)  Requested for: 13RIM8302; Last   Rx:09Suw2749 Ordered   8  Folic Acid 1 MG Oral Tablet; TAKE 1 TABLET DAILY AS DIRECTED; Therapy: 72IVG2187 to (Evaluate:34Ihs6913)  Requested for: 81GIX7262; Last   Rx:28Ljw7079 Ordered   9  Furosemide 40 MG Oral Tablet; Take one tablet every morning and 1/2 tablet every   evening; Therapy: 54YVQ3489 to (Roberto Talyor)  Requested for: 58MCT3907 Recorded   10  Generlac 10 GM/15ML SYRP Recorded   11  HydrALAZINE HCl - 50 MG Oral Tablet; TAKE 1 TABLET 3 TIMES DAILY; Therapy: 83YZP5621 to (Evaluate:26Jan2017)  Requested for: 52NUV9439; Last    Rx:22Tzp3571 Ordered   12  Hydrocodone-Acetaminophen 5-325 MG Oral Tablet; TAKE 1 TABLET EVERY 6 HOURS    AS NEEDED FOR PAIN;    Therapy: 51FRR0153 to (Evaluate:07Jan2016); Last Rx:51Kgz3047 Ordered   13  Levothyroxine Sodium 88 MCG Oral Tablet (Synthroid); take 1 tablet every day; Therapy: 09EFG3400 to (Last Rx:02Feb2016)  Requested for: 54Axw5740 Ordered   14  LORazepam 2 MG Oral Tablet; TAKE 1 TABLET TWICE DAILY AS NEEDED; Therapy: 36XTK9279 to (Evaluate:83Scm2521); Last Rx:68Weo3650 Ordered   15  Magnesium  (64 Mg) MG TBCR; TAKE 1 TABLET DAILY; Therapy: 56HXH6831 to (Evaluate:86Dbn8133); Last Rx:77Iss2132 Ordered   16  Metoprolol Tartrate 50 MG Oral Tablet; take 1 tablet by mouth twice a day; Therapy: 56KBX9064 to (Chris Urias)  Requested for: 26AFV7052; Last    Rx:26Jan2016 Ordered   17  OneTouch Ultra Blue In [employer] Strip; TEST EVERY DAY AND AS NEEDED; Therapy: 58HYF0826 to (Evaluate:19Apr2016)  Requested for: 12Hvc9984; Last    Rx:66Zyh4044 Ordered   18  Pantoprazole Sodium 40 MG Oral Tablet Delayed Release; TAKE 1 TABLET DAILY; Therapy: 15OVE4207 to (Evaluate:02Apr2016)  Requested for: 60OLY6557; Last    Rx:05Oct2015 Ordered   19  Pravastatin Sodium 80 MG Oral Tablet; take 1 tablet by mouth every day; Therapy: 65JVY8073 to (Evaluate:15Nov2016)  Requested for: 22Dec2015; Last    Rx:19Apr2015 Ordered   20   PredniSONE 5 MG Oral Tablet; Take 1-1/2 tablets daily; Therapy: 97LSD1632 to (Evaluate:14Mar2016)  Requested for: 20Mar2015; Last    Rx:20Mar2015 Ordered   21  ROPINIRole HCl - 0 25 MG Oral Tablet; take 1 tablet by mouth twice daily; Therapy: 07YIQ9812 to (Evaluate:01Wfc4056)  Requested for: 05UWK1484; Last    Rx:69Vfk9473 Ordered   22  Sertraline HCl - 100 MG Oral Tablet; TAKE 2 TABLETS DAILY; Therapy: 98ZQP9387 to (Evaluate:09Aug2016)  Requested for: 94NAT1733; Last    Rx:11Feb2016 Ordered   23  Sodium Bicarbonate 650 MG Oral Tablet; Take one tablet daily; Therapy: 45CZV4880 to (Evaluate:12Apr2016)  Requested for: 30YVS5434 Recorded   24  Tacrolimus 1 MG Oral Capsule; TAKE 4 CAPSULES every morning and 3 capsules every    evening; Therapy: 89BTI7498 to (Evaluate:48Xqo9966)  Requested for: 15STV4760; Last    Rx:15Feb2016 Ordered   25  TraZODone HCl - 150 MG Oral Tablet; TAKE 1 TABLET AT BEDTIME; Therapy: 55XKE0099 to (Evaluate:09Aug2016)  Requested for: 11TFT8918; Last    Rx:11Feb2016 Ordered   26  Vitamin D3 3000 UNIT Oral Tablet; 1 TAB DAILY; Therapy: 29BWF2345 to (Evaluate:10Feb2016) Recorded    Allergies    1  Penicillins   2  Morphine Derivatives   3   Candido Kenyatta    Signatures   Electronically signed by : EDGAR Cavanaugh Feb 26 2016  3:21PM EST

## 2018-01-12 NOTE — PROGRESS NOTES
Plan    1  DSMT/MNT Time Record; Status:Complete;   Done: 95QHS3035 09:15AM    Chief Complaint  Marcelle Sarmiento was seen for MNT for type 1 diabetes s/p renal and pancreas transplant  History of Present Illness  Patient stated that she needs to follow a diet low in sodium, potassium, and phosphorus and has guidelines for those restrictions somewhere at home  Now that her pancreas transplant is declining in function she requires both long and fast-acting insulin and needs to control carbohydrate intake  She learned about diabetes meal planning at diagnosis in the 1980's so is somewhat familiar with the exchange lists but hasn't given much thought to the carbohydrate content of the foods she eats now as she has been virtually without diabetes for the past 19 years  Additionally, while she may be aware of her need for Na, K, and P restrictions, her diet history reveals high intake of all these minerals  She eats a lot of prepared and processed foods and minimal vegetables, fruits, or whole grains  She consumes a gallon of milk in a week plus yogurt as well  She states she is supposed to follow a 1 5 liter fluid restriction but admits that she doesn't track her fluid intake and reports drinking low sugar Gatorade, juices, milk, and hot tea liberally  Used the portion booklet to reintroduce foods and food groups, focusing on carbohydrate foods for now  She realized that her portions are inconsistent and excessive at times  Encouraged use of more low K fruits instead of additional servings of processed foods to meet carbohydrate needs  Explained why she is having some between meal hypoglycemia events and how to avoid  Encouraged her to read total carbohydrate on food labels, follow serving sizes, and use measuring cups  Recommended eliminating juices and limiting milk to 2 servings per day (explaining that several cups of tea w/ milk per day may take care of one milk serving) Explained need for fluid limitation  Discussed impact of excessive sodium and fluid on weight as well as kidney function  Created an individualized meal plan with 3 meals and 1-3 snacks as needed and asked her to complete a 3 day food diary before her next visit  This is her initial assessment    Present at session: patient  and father    Data:   Takes 3-5 units Humalog before meals   Medical Diagnosis/Reason for Referral:E11 21  She has special learning needs: self-selected memory as a learning barrier on self-assessment  Her caloric needs are 1600  Recent weight change: gradual gain  Patient  shops for food  Patient  and mother  cooks the food  Exercise routine:    none   She eats breakfast at  6:30 AM Yoplait lite OR 2+ c  Raisin Bran OR 1c  granola w/ 2% milk, hote tea w/ milk and sweet'n'low   She snacks at 10 AM 6 pack Carlos cheese cracker sandwiches OR yogurt   She eats lunch at  12 PM Lean Cuisine OR 1c soup (not low sodium) and saltine crackers  1 cookie   She snacks at 2 PM Piece of fruit or 1c  pineapple   She eats dinner at  4:30-5 PM Home cooked dinner from mom, had pepper steak and rice 2 nights ago, otherwise has Lean Cuisine or soup, cookie or weight watchers packaged dessert   She snacks at at bedtime 8 oz  of milk     NUTRITION DIAGNOSES   Inconsistent Intake Carbs   Inconsistent carbohydrate intake related to: Physiological causes requiring careful timing and consistency in the amount of carbohydrate (i e  diabetes mellitus, hypoglycemia) and Food and nutrition related knowledge deficit concerning appropriate amount and timing of carbohydrate intake   As evidenced by: Hypoglycemia or hyperglycemia documented on regular basis associated with inconsistent carbohydrate intake, Estimated carbohydrate intake that is different from recommended types or ingested on an irregular basis and Use of insulin or insulin secretagogues   Food And Nutrition Related Knowledge Defici   Food and nutrition related knowledge deficit related to  lack of prior exposure to accurate nutrition related information and Impaired cognitive ability, including learning disabilities, neurological or sensory impairment, and/or dementia  As evidenced by  no prior knowledge of need for food and nutrition related recommendations, demonstrates inability to apply food and nutrition related information (i e  select foods based on nutrition therapy) and new medical diagnosis or change in existing diagnosis or condition  Medical Nutrition Therapy Intervention: Carbohydrate counting, Meal planning, Individualized meal plan, Strategies to monitor portion control, Label reading, Meal timing and Exercise Guidelines  Her comprehension was fair   Her motivation was good   Her compliance was fair   Goals:  1  45-60 g carb/day  2  Measure foods  3  Read food labels  4  Keep 3 day food record before next visit      Vitals  Signs   Recorded: 99IOS4348 01:36PM   Weight: 230 lb 9 6 oz  BMI Calculated: 35 58  BSA Calculated: 2 16    Future Appointments    Date/Time Provider Specialty Site   02/16/2018 10:00 AM EDGAR Alex  [address] [place] [address]   12/01/2017 09:15 AM Ashwin No RD [facility]ator [facility]   12/04/2017 08:45 AM EDGAR Paul  Hematology [facility] [place]   10/25/2017 03:00 PM EDGAR Arambula  Psychiatry [facility]   11/08/2017 09:00 AM EDGAR Acevedo   Nephrology [place] [id number]   12/06/2017 09:15 AM Roskos, Virgia Heimlich, [address] [place] [facility]     Signatures   Electronically signed by : Johana Ordonez RD; Oct 20 2017  1:53PM EST                       (Author)    Electronically signed by : EDGAR Davalos ; Oct 23 2017  8:13AM EST

## 2018-01-12 NOTE — MISCELLANEOUS
Message  called as Dr Sunshine Bobo is away  reviewed iron labs  stores poor  fec occ neg X 1  med list reviewed  Hb stable but lower than prior  other cell lines stable  monitor closely for marrow suppression also  1      - repeat fec occ X 2  - iron repletion  mg one time ordered; received one time a couple of weeks ago  - repeat iron stores after, and CBC  - if now improvement, may need further eval for marrow suppression - Viral vs other infectious vs iatrogenic vs other   May need to see Cathy La        1 Amended By: Enmanuel Valerio; May 24 2017 12:52 PM EST    Signatures   Electronically signed by : EDGAR Leiva ; May 24 2017 12:53PM EST                       (Author)

## 2018-01-12 NOTE — MISCELLANEOUS
History of Present Illness  TCM Communication [place]: The patient is being contacted for follow-up after hospitalization  She was hospitalized at [facility]  The dates of hospitalization: 1, date of admission: 9/17/2016, date of discharge: 9/17/2016  Diagnosis: Wrist Injury  She was discharged to home  Medications were not reviewed today  She did not schedule a follow up appointment  She refused a follow up appointment due to Patient was called for two days did not returned call, left messages  Communication performed and completed by ELENA Ortega      Active Problems    1  Abnormal EKG (794 31) (R94 31)   2  Acid reflux disease (530 81) (K21 9)   3  Allergic urticaria (708 0) (L50 0)   4  Anemia (285 9) (D64 9)   5  Antibiotic prophylaxis for dental procedure indicated due to prior joint replacement   (V58 62) (Z79 2)   6  Atrial fibrillation (427 31) (I48 91)   7  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)   8  Benign hypertensive CKD (403 10) (I12 9)   9  Bilateral leg edema (782 3) (R60 0)   10  Bruit of left carotid artery (785 9) (R09 89)   11  Cataract (366 9) (H26 9)   12  Chronic diastolic congestive heart failure (428 32,428 0) (I50 32)   13  Chronic kidney disease, stage 3 (585 3) (N18 3)   14  Cocaine abuse in remission (305 63) (F14 10)   15  Cognitive changes (799 59) (R41 89)   16  Constipation (564 00) (K59 00)   17  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, without long-term    current use of insulin (250 61) (E10 49)   18  Depression with anxiety (300 4) (F41 8)   19  Diabetes mellitus with nephropathy (250 40,583 81) (E11 21)   20  Diabetic Gastropathy (537 9)   21  Diabetic polyneuropathy (250 60,357 2) (E11 42)   22  Diabetic retinopathy (250 50,362 01) (E11 319)   23  Diabetic Ulcer Of The Left Foot (250 80)   24  ROD (dyspnea on exertion) (786 09) (R06 09)   25  Drug-induced Essential Tremor (333 1)   26  Dysuria (788 1) (R30 0)   27   Encephalopathy (348 30) (G93 40) 28  Encounter for screening mammogram for breast cancer (V76 12) (Z12 31)   29  Esophagitis, reflux (530 11) (K21 0)   30  External Hemorrhoids (455 3)   31  FELIPE (generalized anxiety disorder) (300 02) (F41 1)   32  H/O kidney transplant (V42 0) (Z94 0)   33  Heart palpitations (785 1) (R00 2)   34  Hospital discharge follow-up (V67 59) (Z09)   35  Hyperlipidemia (272 4) (E78 5)   36  Hyperplastic colon polyp (211 3) (K63 5)   37  Hypertension (401 9) (I10)   38  Hypothyroidism (244 9) (E03 9)   39  Increased frequency of urination (788 41) (R35 0)   40  Increased white blood cell count (288 60) (D72 829)   41  Insomnia (780 52) (G47 00)   42  Irritable bowel syndrome (564 1) (K58 9)   43  Major depressive disorder, recurrent episode, in full remission (296 36) (F33 42)   44  Memory loss (780 93) (R41 3)   45  Memory loss or impairment (780 93) (R41 3)   46  Metabolic acidosis (637 1) (E87 2)   47  MGUS (monoclonal gammopathy of unknown significance) (273 1) (D47 2)   48  Neck pain (723 1) (M54 2)   49  Need for influenza vaccination (V04 81) (Z23)   50  Nonrheumatic aortic valve insufficiency (424 1) (I35 1)   51  OAB (overactive bladder) (596 51) (N32 81)   52  Osteopenia (733 90) (M85 80)   53  Osteoporosis (733 00) (M81 0)   54  Peripheral vascular disease (443 9) (I73 9)   55  Post traumatic stress disorder (309 81) (F43 10)   56  Proteinuria (791 0) (R80 9)   57  Rectal polyp (569 0) (K62 1)   58  Recurrent UTI (599 0) (N39 0)   59  Restless legs syndrome (333 94) (G25 81)   60  Secondary hyperparathyroidism, renal (588 81) (N25 81)   61  Sinus Tachycardia (427 89)   62  SOB (shortness of breath) (786 05) (R06 02)   63  Tremor (781 0) (R25 1)   64  Unsteady gait (781 2) (R26 81)    Past Medical History    1  History of Abnormal Liver Function Test (790 6)   2  History of Abnormal urine (791 9) (R82 90)   3  History of Acute on chronic diastolic congestive heart failure (428 33,428 0) (I50 33)   4   History of Cervical Dysplasia (V13 22)   5  History of Denial Of Any Significant Medical History   6  History of Diabetes mellitus with foot ulcer (250 80,707 15) (E11 621,L97 509)   7  History of cholelithiasis (V12 79) (Z87 19)   8  History of gastritis (V12 79) (Z87 19)   9  Denied: History of head injury   10  History of hematuria (V13 09) (Z87 448)   11  History of hyperkalemia (V12 29) (Z86 39)   12  History of pneumonia (V12 61) (Z87 01)   13  History of seborrhea (V13 3) (Z87 2)   14  History of urinary tract infection (V13 02) (Z87 440)   15  History of Hyponatremia (276 1) (E87 1)   16  History of Iliotibial band syndrome (728 89) (M76 30)   17  History of Infected tooth (522 4) (K04 7)   18  History of Lumbar radiculopathy (724 4) (M54 16)   19  Denied: History of Seizures   20  History of Trochanteric bursitis, unspecified laterality (726 5) (M70 60)   21  History of Urinary Tract Infection (V13 02)   22  History of UTI (urinary tract infection), bacterial (599 0,041 9) (N39 0,A49 9)   23  History of Vaginal itching (698 1) (L29 8)    Surgical History    1  History of Cataract Surgery   2  History of Cholecystectomy   3  History of Complete Colonoscopy   4  History of Complete Colonoscopy   5  History of Diagnostic Esophagogastroduodenoscopy   6  History of Diagnostic Esophagogastroduodenoscopy   7  History of Dilation And Curettage   8  History of Foot Surgery   9  History of Pancreatic Transplantation   10  Renal Transplant   11  History of Surgery Left Foot Amputation    Family History  Mother    1  No family history of mental disorder  Father    2  Family history of cancer (V16 9) (Z80 9)   3  No family history of mental disorder  Sister    4  Family history of depression (V17 0) (Z81 8)  Grandparent    5  Family history of cancer (V16 9) (Z80 9)  Maternal Grandmother    6   Family history of Breast Cancer (V16 3)    Social History    · Denied: History of Alcohol Use (History)   · Former smoker (V15 82) (A04 690)   · History of Uses cocaine    Current Meds   1  AmLODIPine Besylate 5 MG Oral Tablet; TAKE 1 TABLET TWICE DAILY; Therapy: 06MLM5066 to (Evaluate:30Apr2017)  Requested for: 02BED5626 Recorded   2  ARIPiprazole 20 MG Oral Tablet; TAKE 1 TABLET AT BEDTIME; Therapy: 03XTG9800 to (Evaluate:87Tsm4287)  Requested for: 29WQX0749; Last   Rx:24Vzl0656 Ordered   3  Aspirin 81 MG TABS; TAKE 1 TABLET DAILY; Therapy: 80TZB0780 to (Evaluate:19Jun2016) Recorded   4  Benadryl 25 MG Oral Tablet; TAKE 1 TABLET AT BEDTIME; Therapy: 12Sep2016 to (Complete:22Sep2016); Last Rx:12Sep2016 Ordered   5  Caltrate 600 TABS; TAKE 1 TABLET TWICE DAILY; Therapy: (Recorded:17Jun2016) to Recorded   6  Catapres 0 3 MG Oral Tablet; TAKE 0 3MG BY MOUTH 2 TIMES A DAY  INDICATIONS:   0 3MG IN AM, 0 2MG AT NOON, AND 0 3MG IN PM;   Therapy: 55CHJ6331 to (Evaluate:30Apr2017)  Requested for: 03MDW9557 Recorded   7  Doxazosin Mesylate 1 MG Oral Tablet; TAKE 1 TABLET AT BEDTIME; Therapy: (Recorded:17Jun2016) to Recorded   8  DULoxetine HCl - 60 MG Oral Capsule Delayed Release Particles; TAKE 1 CAPSULE   TWICE DAILY; Therapy: 32LGL8027 to (Evaluate:81Dzn9698)  Requested for: 14NNO9279; Last   Rx:47Vhr0387 Ordered   9  Folic Acid 1 MG Oral Tablet; TAKE 1 TABLET DAILY AS DIRECTED; Therapy: 88QWV6810 to (Evaluate:54Rkj0987)  Requested for: 73UMM7980; Last   Rx:84Xmh5926 Ordered   10  Furosemide 40 MG Oral Tablet; TAKE 1 TO 2 TABLETS DAILY AS NEEDED FOR EDEMA; Therapy: 39NUR5484 to (Evaluate:25Jun2017)  Requested for: 54EBD7349; Last    Rx:30Jun2016 Ordered   11  HydrALAZINE HCl - 50 MG Oral Tablet; TAKE 1 TABLET 3 TIMES DAILY; Therapy: 74COC9535 to (Evaluate:26Jan2017)  Requested for: 24ULW5218; Last    Rx:85Ucy2682 Ordered   12  Lactulose 10 GM/15ML Oral Solution; TAKE 30 ML DAILY; Therapy: (Recorded:17Jun2016) to Recorded   13  Levothyroxine Sodium 88 MCG Oral Tablet; take 1 tablet by mouth daily;     Therapy: 75MUU1717 to (Franco Clubs)  Requested for: 57ASS2972; Last    UC:53XCB8574 Ordered   14  LORazepam 2 MG Oral Tablet; TAKE 1 TABLET TWICE DAILY AS NEEDED; Therapy: 50JMU4351 to (Evaluate:97Mwc7470); Last Rx:65Ewq9503 Ordered   15  Metoprolol Tartrate 50 MG Oral Tablet; take 1 tablet by mouth twice daily; Therapy: 03AJD5793 to (Evaluate:09Jun2017)  Requested for: 19OGW1872; Last    Rx:14Jun2016 Ordered   16  OneTouch Ultra Blue In [employer]; TEST TWICE A DAY; Therapy: 08XGL4545 to (Evaluate:45Opn1408)  Requested for: 90YUM3190; Last    Rx:10Mar2016 Ordered   17  OneTouch UltraSoft Lancets Miscellaneous; test twice daily; Therapy: 64HER4022 to (Joel Moreira)  Requested for: 62UMJ2332; Last    Rx:09Mar2016 Ordered   18  Pantoprazole Sodium 40 MG Oral Tablet Delayed Release; take 1 tablet by mouth every    day; Therapy: 76LSS7749 to (Evaluate:14Ach7082)  Requested for: 27BMG8172; Last    Rx:48Hjz7094 Ordered   19  Pravastatin Sodium 80 MG Oral Tablet; take 1 tablet by mouth every day; Therapy: 82UEC2613 to (Hunter Horne)  Requested for: 80RWN8216; Last    GE:50PBN4085 Ordered   20  PredniSONE 5 MG Oral Tablet; TAKE ONE AND 1/2 TABLETS BY MOUTH DAILY; Therapy: 29KFD1989 to (Laurita Draft)  Requested for: 11ETX4454; Last    Rx:59Moy7534 Ordered   21  Prograf 1 MG Oral Capsule; Take 4 in the morning and 3 in the evening; Therapy: (Recorded:33Zpd0582) to Recorded   22  ROPINIRole HCl - 0 25 MG Oral Tablet; take 1 tablet by mouth twice daily; Therapy: 52FWR5363 to (Evaluate:18Daa8568)  Requested for: 64QLI5310; Last    Rx:08Oae8021 Ordered   23  Sertraline HCl - 100 MG Oral Tablet; TAKE 2 TABLETS DAILY; Therapy: 80DXR6781 to (Evaluate:07Dqo6517)  Requested for: 79ZLZ8795; Last    Rx:24Vxt7302 Ordered   24  Sodium Bicarbonate 650 MG Oral Tablet; Take one tablet daily; Therapy: 73ISN1372 to (Evaluate:12Apr2016)  Requested for: 51SBS2007 Recorded   25   TraZODone HCl - 150 MG Oral Tablet; TAKE 1 TABLET AT BEDTIME; Therapy: 93XTW6738 to (Janne Sacks)  Requested for: 40Uyl6254; Last    Rx:54Nav9375 Ordered   26  Vitamin D3 1000 UNIT Oral Capsule; TAKE 3 PILLS AT NOON BY MOUTH; Therapy: (Recorded:17Jun2016) to Recorded    Allergies    1  Penicillins   2  Morphine Derivatives   3  Duricef TABS   4  Myrbetriq TB24    Future Appointments    Date/Time Provider Specialty Site   10/03/2016 09:30 AM EDGAR Holloway  [address]   02/28/2017 10:00 AM EDGAR Holloway  [address] [place] [address]   10/07/2016 09:20 AM EDGAR Vaughn  Cardiology [facility]   09/29/2016 02:45 PM EDGAR Giles   Urology [facility] Desire     Signatures   Electronically signed by : EDGAR Magallanes ; Sep 21 2016  8:16AM EST                       (Author)

## 2018-01-12 NOTE — MISCELLANEOUS
Message   Recorded as Task   Date: 08/31/2017 05:00 PM, Created By: Cristian Ponce   Task Name: Follow Up   Assigned To: Adrian Rodriguez   Regarding Patient: Ciro Garcia, Status: In Progress   Comment:    Humza Gusman - 31 Aug 2017 5:00 PM     TASK CREATED  Hello    Can patient also have referral to Urology  Review of her u/s shows a bladder diverticulum  it is small  generally, they are non significant; but given recurrent UTIs she should be seen by their team      The referral reason can be recurrent UTI with bladder diverticulum      Thank you    Ana Maria Serrano - 01 Sep 2017 8:54 AM     TASK IN PROGRESS   MJ already follows with a urologist, she was previously following with [facility] and now follows with Dr Matthias Sexton  Last stacey't was June of 2017 that office note has been requested  She was due to follow up this month however cancelled due to her bone marrow biopsy  She will reschedule once her biposy is completed  [facility] 9/1/2017    thank you  np       Active Problems    1  Abnormal EKG (794 31) (R94 31)   2  Acid reflux disease (530 81) (K21 9)   3  Acute kidney injury superimposed on CKD (866 00,585 9) (N17 9,N18 9)   4  Acute UTI (599 0) (N39 0)   5  Anemia (285 9) (D64 9)   6  Antibiotic prophylaxis for dental procedure indicated due to prior joint replacement   (V58 62) (Z79 2)   7  Atrial fibrillation (427 31) (I48 91)   8  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)   9  Benign hypertensive CKD (403 10) (I12 9)   10  Bilateral carotid bruits (785 9) (R09 89)   11  Bilateral leg edema (782 3) (R60 0)   12  Bruit of left carotid artery (785 9) (R09 89)   13  Cataract (366 9) (H26 9)   14  Chronic constipation (564 00) (K59 09)   15  Chronic diastolic congestive heart failure (428 32,428 0) (I50 32)   16  Chronic kidney disease, stage 4 (severe) (585 4) (N18 4)   17  Cocaine abuse in remission (305 63) (F14 10)   18  Cognitive changes (799 59) (R41 89)   19   Colon cancer screening (V76 51) (Z12 11)   20  Constipation (564 00) (K59 00)   21  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, without long-term    current use of insulin (250 61) (E10 49)   22  Diabetes mellitus with diabetic nephropathy (250 40,583 81) (E11 21)   23  Diabetic Gastropathy (537 9)   24  Diabetic polyneuropathy (250 60,357 2) (E11 42)   25  Diabetic retinopathy (250 50,362 01) (E11 319)   26  Diabetic Ulcer Of The Left Foot (250 80)   27  Diastolic dysfunction (452 7) (I51 9)   28  ROD (dyspnea on exertion) (786 09) (R06 09)   29  Drug-induced Essential Tremor (333 1)   30  Encounter for screening mammogram for breast cancer (V76 12) (Z12 31)   31  Esophagitis, reflux (530 11) (K21 0)   32  External Hemorrhoids (455 3)   33  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)   34  Fatigue (780 79) (R53 83)   35  FELIPE (generalized anxiety disorder) (300 02) (F41 1)   36  H/O kidney transplant (V42 0) (Z94 0)   37  Heart palpitations (785 1) (R00 2)   38  Hospital discharge follow-up (V67 59) (Z09)   39  Hyperlipidemia (272 4) (E78 5)   40  Hyperplastic colon polyp (211 3) (K63 5)   41  Hypertension (401 9) (I10)   42  Hyponatremia (276 1) (E87 1)   43  Hypothyroidism (244 9) (E03 9)   44  Increased frequency of urination (788 41) (R35 0)   45  Increased white blood cell count (288 60) (D72 829)   46  Insomnia (780 52) (G47 00)   47  Irritable bowel syndrome (564 1) (K58 9)   48  Major depressive disorder, recurrent episode, in full remission (296 36) (F33 42)   49  Memory loss (780 93) (R41 3)   50  Memory loss or impairment (780 93) (R41 3)   51  Metabolic acidosis (053 3) (E87 2)   52  MGUS (monoclonal gammopathy of unknown significance) (273 1) (D47 2)   53  Need for influenza vaccination (V04 81) (Z23)   54  Nonrheumatic aortic valve insufficiency (424 1) (I35 1)   55  OAB (overactive bladder) (596 51) (N32 81)   56  Osteopenia (733 90) (M85 80)   57  Osteoporosis (733 00) (M81 0)   58   Other calculus in bladder (594 1) (N21 0)   59  Peripheral vascular disease (443 9) (I73 9)   60  Post traumatic stress disorder (309 81) (F43 10)   61  Preop general physical exam (V72 83) (Z01 818)   62  Preoperative cardiovascular examination (V72 81) (Z01 810)   63  Proteinuria (791 0) (R80 9)   64  Pyelonephritis (590 80) (N12)   65  Rectal polyp (569 0) (K62 1)   66  Recurrent UTI (599 0) (N39 0)   67  Restless legs syndrome (333 94) (G25 81)   68  Secondary hyperparathyroidism, renal (588 81) (N25 81)   69  Snoring (786 09) (R06 83)   70  SOB (shortness of breath) (786 05) (R06 02)   71  Status post amputation of right great toe (V49 71) (Z89 411)   72  Status post pancreas transplantation (V42 83) (Z94 83)   73  Status post transmetatarsal amputation of left foot (V49 73) (Z89 432)   74  Tremor (781 0) (R25 1)   75  Unsteady gait (781 2) (R26 81)   76  Weakness (780 79) (R53 1)    Current Meds   1  Acetaminophen 325 MG Oral Tablet; TAKE 1 TO 2 TABLETS EVERY 6 HOURS AS   NEEDED Recorded   2  AmLODIPine Besylate 10 MG Oral Tablet; TAKE 1 TABLET BY MOUTH EVERY   MORNING AND 1/2 TABLET BY MOUTH EVERY EVENING; Therapy: 92IWE9742 to (Marie Ceja)  Requested for: 11Aug2017; Last   Rx:11Aug2017 Ordered   3  ARIPiprazole 20 MG Oral Tablet; Therapy: 11YXY1058 to  Requested for: 14Pvr2198 Recorded   4  Aspirin 81 MG TABS; TAKE 1 TABLET DAILY; Therapy: 80CAQ0729 to (Evaluate:19Jun2016) Recorded   5  BD Pen Needle Mary U/F 32G X 4 MM Miscellaneous; Use 4x daily; Therapy: 06QTJ9717 to (Evaluate:11Aug2018)  Requested for: 65LQE5722; Last   Rx:04Ych8919 Ordered   6  Catapres 0 1 MG Oral Tablet; take 3 tab  in am, 2 tab at noon, 3 tab  at night; Therapy: 20PGT6433 to (Last Rx:02Lid9258)  Requested for: 74Bjo4195 Ordered   7  Cephalexin 500 MG Oral Capsule; TAKE 1 CAPSULE TWICE DAILY; Therapy: 49Taa2638 to (Evaluate:89Mod5290)  Requested for: 17Bbq9509; Last   Rx:24Jgb3387 Ordered   8   Clindamycin HCl - 300 MG Oral Capsule; take 2 caps  1 hr  prior to dental procedure; Therapy: 36GSJ8094 to (Last Rx:19Ifr4797)  Requested for: 25Ass1281 Ordered   9  Doxazosin Mesylate 1 MG Oral Tablet; TAKE 1 TABLET Bedtime  Requested for:   32QUK3329; Last Rx:38Cwi8184 Ordered   10  DULoxetine HCl - 60 MG Oral Capsule Delayed Release Particles; TAKE 1 CAPSULE    TWICE DAILY; Therapy: 35MEP0956 to (Evaluate:90Qav2318)  Requested for: 25WZQ1415; Last    Rx:02Mar2017 Ordered   11  Folic Acid 1 MG Oral Tablet; TAKE 1 TABLET DAILY AS DIRECTED; Therapy: 96LZJ8088 to (96 617002)  Requested for: 32KZN1639; Last    Rx:12Oct2016 Ordered   12  Furosemide 20 MG Oral Tablet; TAKE 1 TABLET DAILY; Therapy: 97Kuz5266 to (Evaluate:03Pdf9271) Recorded   13  HumaLOG KwikPen 100 UNIT/ML Subcutaneous Solution Pen-injector; Inject 5 units 3    times daily with meals; Therapy: 47XIO9796 to (Evaluate:79Wnc9023)  Requested for: 29Aug2017 Recorded   14  HydrALAZINE HCl - 50 MG Oral Tablet; TAKE 1 TABLET 3 TIMES DAILY; Therapy: 67KJB3025 to (Twilla Hard)  Requested for: 54NFH7643; Last    Rx:13Mar2017 Ordered   15  Lactulose 10 GM/15ML Oral Solution; TAKE 30 ML DAILY; Therapy: 24ZRY6599 to (Last Rx:02Jun2017)  Requested for: 02Jun2017 Ordered   16  Levothyroxine Sodium 88 MCG Oral Tablet; take 1 tablet by mouth daily; Therapy: 83UQI5429 to (Evaluate:63Adn6488)  Requested for: 18Aaj2029 Recorded   17  LORazepam 2 MG Oral Tablet; Take 1 tablet 2 times daily as needed; Therapy: 39FOS7584 to (Evaluate:65Vdw2047)  Requested for: 10Aug2017 Recorded   18  Metoprolol Tartrate 50 MG Oral Tablet; take 1 tablet by mouth twice daily; Therapy: 36LXK1064 to (Evaluate:21Wwl7544)  Requested for: 27Jun2017; Last    YF:34UQF4202 Ordered   19  OneTouch Ultra Blue In [employer]; test twice daily; Therapy: 33BZJ2960 to (Evaluate:75Isi0940)  Requested for: 00WSP5392; Last    Rx:11Aug2017 Ordered   20   OneTouch UltraSoft Lancets Miscellaneous; test twice daily; Therapy: 05ZFM0376 to (Evaluate:13Hgr2712)  Requested for: 08Nls6250 Recorded   21  Pravastatin Sodium 80 MG Oral Tablet; take 1 tablet by mouth every day; Therapy: 83EJA8005 to (Evaluate:65Hwi5571)  Requested for: 23JHL4595; Last    Rx:22Jan2017 Ordered   22  PredniSONE 5 MG Oral Tablet; TAKE 1 1/2 TABLETS BY MOUTH EVERY DAY; Therapy: 49QEX7207 to (Evaluate:44Ybw8070)  Requested for: 67QFW5230; Last    Rx:06Jun2017 Ordered   23  ROPINIRole HCl - 0 25 MG Oral Tablet; take 1 tablet by mouth twice daily; Therapy: 81NAG1549 to (Padmini Miranda)  Requested for: 57Pxo2000; Last    Rx:48Yas5334 Ordered   24  Sertraline HCl - 100 MG Oral Tablet; TAKE 2 TABLET BY MOUTH DAILY; Therapy: 20YOB0191 to [phone])  Requested for: 82YFA0778; Last    Rx:08Suv6901 Ordered   25  Sodium Bicarbonate 650 MG Oral Tablet; Take one tablet daily; Therapy: 83ZKO5869 to (Evaluate:12Apr2016)  Requested for: 11DJR8085 Recorded   26  Tacrolimus 1 MG Oral Capsule; Take 4 in the morning and 3 in the evening  Requested    for: 73Dnp1678; Last Rx:47Hog1559 Ordered   27  Toujeo SoloStar 300 UNIT/ML Subcutaneous Solution Pen-injector; INJECT 20 UNIT    Bedtime; Therapy: 84TNS7454 to (Evaluate:54Muf5583)  Requested for: 06Llw2090 Recorded   28  TraZODone HCl - 150 MG Oral Tablet; TAKE 1 TABLET AT BEDTIME; Therapy: 49NXO0396 to ([facility])  Requested for: 38Nal1280; Last    Rx:28Mrw2574 Ordered   29  Vitamin D3 1000 UNIT Oral Capsule; TAKE 3 PILLS AT NOON BY MOUTH; Therapy: (Recorded:17Jun2016) to Recorded    Allergies    1  Penicillins   2  Morphine Derivatives   3  Duricef TABS   4   Myrbetriq CO10    Signatures   Electronically signed by : EDGAR Amador ; Sep  5 2017  9:16AM EST                       (Author)

## 2018-01-12 NOTE — MISCELLANEOUS
Message   Recorded as Task   Date: 03/29/2016 04:33 PM, Created By: Alexandria Cerrato   Task Name: Miscellaneous   Assigned To: [facility],Team   Regarding Patient: Poly Rosario, Status: Active   CommentMurpbenny Bailey - 29 Mar 2016 4:33 PM     TASK CREATED  ferraheme per protocol now    Patient is aware and she is scheduled for 4/6 for the Feraheme  kja      Active Problems    1  Abnormal EKG (794 31) (R94 31)   2  Acid reflux disease (530 81) (K21 9)   3  Acute on chronic diastolic congestive heart failure (428 33,428 0) (I50 33)   4  Anemia (285 9) (D64 9)   5  Atrial fibrillation (427 31) (I48 91)   6  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)   7  Benign hypertensive CKD (403 10) (I12 9)   8  Bilateral leg edema (782 3) (R60 0)   9  Bruit of left carotid artery (785 9) (R09 89)   10  Cataract (366 9) (H26 9)   11  Chronic diastolic congestive heart failure (428 32,428 0) (I50 32)   12  Chronic kidney disease, stage 3 (585 3) (N18 3)   13  Cocaine abuse in remission (305 63) (F14 10)   14  Cognitive changes (799 59) (R41 89)   15  Constipation (564 00) (K59 00)   16  Diabetic Gastropathy (537 9)   17  Diabetic Nephropathy (583 81)   18  Diabetic polyneuropathy (250 60,357 2) (E11 42)   19  Diabetic retinopathy (250 50,362 01) (E11 319)   20  Diabetic Ulcer Of The Left Foot (250 80)   21  ROD (dyspnea on exertion) (786 09) (R06 09)   22  Drug-induced Essential Tremor (333 1)   23  Encounter for screening mammogram for breast cancer (V76 12) (Z12 31)   24  Esophagitis, reflux (530 11) (K21 0)   25  External Hemorrhoids (455 3)   26  FELIPE (generalized anxiety disorder) (300 02) (F41 1)   27  H/O kidney transplant (V42 0) (Z94 0)   28  Heart palpitations (785 1) (R00 2)   29  Hyperlipidemia (272 4) (E78 5)   30  Hyperplastic colon polyp (211 3) (K63 5)   31  Hypertension (401 9) (I10)   32  Hypothyroidism (244 9) (E03 9)   33  Increased white blood cell count (288 60) (D72 829)   34   Insomnia (780 52) (G47 00)   35  Irritable bowel syndrome (564 1) (K58 9)   36  Major depressive disorder, recurrent episode, in full remission (296 36) (F33 42)   37  Memory loss (780 93) (R41 3)   38  Metabolic acidosis (273 5) (E87 2)   39  MGUS (monoclonal gammopathy of unknown significance) (273 1) (D47 2)   40  Neck pain (723 1) (M54 2)   41  Nonrheumatic aortic valve insufficiency (424 1) (I35 1)   42  Osteopenia (733 90) (M85 80)   43  Osteoporosis (733 00) (M81 0)   44  Peripheral vascular disease (443 9) (I73 9)   45  Post traumatic stress disorder (309 81) (F43 10)   46  Proteinuria (791 0) (R80 9)   47  Rectal polyp (569 0) (K62 1)   48  Restless legs syndrome (333 94) (G25 81)   49  Secondary hyperparathyroidism, renal (588 81) (N25 81)   50  Sinus Tachycardia (427 89)   51  SOB (shortness of breath) (786 05) (R06 02)   52  Tremor (781 0) (R25 1)   53  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 (694 08) (K90 23)    Current Meds   1  AmLODIPine Besylate 10 MG Oral Tablet; take 1/2 tab  BID; Therapy: 74UQS3651 to (Evaluate:18Mar2017)  Requested for: 38GAZ2543; Last   Rx:23Mar2016 Ordered   2  ARIPiprazole 20 MG Oral Tablet; TAKE 1 TABLET AT BEDTIME; Therapy: 29UPK8430 to (Evaluate:41Gps5191)  Requested for: 23WXG5374; Last   Rx:18Feb2016 Ordered   3  Aspirin 81 MG Oral Tablet; TAKE 1 TABLET DAILY; Therapy: 39ZOC9098 to (Evaluate:19Jun2016) Recorded   4  Citracal TABS; Take one tablet twice daily Recorded   5  CloNIDine HCl - 0 1 MG Oral Tablet; Take three tablets every morning, two tablets every   noon, and three tablets every evening; Therapy: 17CBP3288 to (Evaluate:96Zut2002)  Requested for: 49NWX9599; Last   Rx:11Nyp5865 Ordered   6  Doxazosin Mesylate 1 MG Oral Tablet (Cardura); TAKE 1 TABLET Bedtime; Therapy: 15TLG0617 to (Evaluate:16Mar2017)  Requested for: 21Mar2016; Last   Rx:21Mar2016 Ordered   7   DULoxetine HCl - 60 MG Oral Capsule Delayed Release Particles (Cymbalta); TAKE 1   CAPSULE TWICE DAILY; Therapy: 93VPD4027 to (Evaluate:09Aug2016)  Requested for: 12BDI4444; Last   Rx:29Bru6358 Ordered   8  Folic Acid 1 MG Oral Tablet; TAKE 1 TABLET DAILY AS DIRECTED; Therapy: 06EMM8781 to (Evaluate:25Ygp3879)  Requested for: 98NLE0793; Last   Rx:89Ffj2660 Ordered   9  Furosemide 40 MG Oral Tablet; Take one tablet every morning and 1/2 tablet every   evening; Therapy: 75YMV9697 to (Ines Bravo)  Requested for: 39DBN3395 Recorded   10  Generlac 10 GM/15ML SYRP Recorded   11  HydrALAZINE HCl - 50 MG Oral Tablet; TAKE 1 TABLET 3 TIMES DAILY; Therapy: 02FLD3608 to (Evaluate:26Jan2017)  Requested for: 33XZF7188; Last    Rx:98Zli0984 Ordered   12  Hydrocodone-Acetaminophen 5-325 MG Oral Tablet; TAKE 1 TABLET EVERY 6 HOURS    AS NEEDED FOR PAIN;    Therapy: 08XEQ6822 to (Evaluate:07Jan2016); Last Rx:34Bys5058 Ordered   13  Levothyroxine Sodium 88 MCG Oral Tablet (Synthroid); take 1 tablet every day; Therapy: 98RHW5719 to (Last Rx:02Feb2016)  Requested for: 02Feb2016 Ordered   14  LORazepam 2 MG Oral Tablet; TAKE 1 TABLET TWICE DAILY AS NEEDED; Therapy: 61KGK4217 to (Evaluate:09Aug2016); Last Rx:11Feb2016 Ordered   15  Magnesium  (64 Mg) MG TBCR; TAKE 1 TABLET DAILY; Therapy: 01PBV8860 to (Evaluate:24Nzf1042); Last Rx:05Jun2013 Ordered   16  Metoprolol Tartrate 50 MG Oral Tablet; take 1 tablet by mouth twice daily; Therapy: 06LZB6045 to (Evaluate:06Jun2016)  Requested for: 90ZUG3430; Last    Rx:08Mar2016 Ordered   17  OneTouch Ultra Blue In [employer]; TEST TWICE A DAY; Therapy: 13XJK9096 to (Evaluate:73Qvu3504)  Requested for: 90FJP0016; Last    Rx:10Mar2016 Ordered   18  OneTouch UltraSoft Lancets Miscellaneous; test twice daily; Therapy: 38POM6610 to (Carlynn Organ)  Requested for: 47LOE3735; Last    Rx:09Mar2016 Ordered   19  Pantoprazole Sodium 40 MG Oral Tablet Delayed Release; TAKE 1 TABLET DAILY;     Therapy: 76CKW1240 to (Evaluate:02Apr2016) Requested for: 21UPQ9692; Last    Rx:57Hqw2551 Ordered   20  Pravastatin Sodium 80 MG Oral Tablet; take 1 tablet by mouth every day; Therapy: 90EVD7579 to (Evaluate:29Okk7441)  Requested for: 66Rbs3533; Last    Rx:95Qqo1302 Ordered   21  PredniSONE 5 MG Oral Tablet; Take 1-1/2 tablets daily; Therapy: 22RNU8169 to (Evaluate:79Kyy0523)  Requested for: 45Gll9151; Last    Rx:20Mar2015 Ordered   22  ROPINIRole HCl - 0 25 MG Oral Tablet; take 1 tablet by mouth twice daily; Therapy: 04JOB5756 to (Evaluate:11Ckx4806)  Requested for: 93GXK9759; Last    Rx:22Mar2016 Ordered   23  Sertraline HCl - 100 MG Oral Tablet; TAKE 2 TABLETS DAILY; Therapy: 25EEO1843 to (Evaluate:11Ibn9509)  Requested for: 41OJF2137; Last    Rx:23Gut8769 Ordered   24  Sodium Bicarbonate 650 MG Oral Tablet; Take one tablet daily; Therapy: 69DCR7237 to (Evaluate:28Dkq5808)  Requested for: 68JXH7670 Recorded   25  Tacrolimus 1 MG Oral Capsule; TAKE 4 CAPSULES every morning and 3 capsules every    evening; Therapy: 08FCZ2546 to (Evaluate:39Upy8044)  Requested for: 01JVS8881; Last    Rx:04Mar2016 Ordered   26  TraZODone HCl - 150 MG Oral Tablet; TAKE 1 TABLET AT BEDTIME; Therapy: 61GOG0724 to (Evaluate:57Lkw4877)  Requested for: 71UMW1946; Last    Rx:68Abt0597 Ordered   27  Vitamin D3 3000 UNIT Oral Tablet; 1 TAB DAILY; Therapy: 68ZYJ4204 to (Evaluate:90Rdl6339) Recorded    Allergies    1  Penicillins   2  Morphine Derivatives   3   Herminia Oh    Signatures   Electronically signed by : EDGAR Ruiz ; Mar 31 2016  2:50PM EST

## 2018-01-12 NOTE — RESULT NOTES
Message   DXA reviewed with pt  pred lowered to 5 mg daily  was on 7 5 alternating with 5 for last 2 weeks and was on 7 5 mg daily prior  has newly diagnosed myeloma  has appt with me in 3 weeks  will review further with pt at that appt also  reviewed case with Hematology and Transplant team  continue current plan as outlined by Heme team, and will attempt to lower immune suppression as stated in this note also  Verified Results  * DXA BONE DENSITY SPINE HIP AND PELVIS 64Xny7756 09:15AM Iliana Members     Test Name Result Flag Reference   DXA BONE DENSITY SPINE HIP AND PELVIS (Report)     CENTRAL DXA SCAN     CLINICAL HISTORY:  47year old post-menopausal  female with history of myeloma, kidney and pancreas transplant, diabetes mellitus and hypothyroidism  The patient does not exercise and takes calcium and vitamin D supplements  She also takes    prednisone  TECHNIQUE: Bone densitometry was performed using a Hologic Horizon A bone densitometer  Regions of interest appear properly placed  There are no obvious fractures or other confounding variables which could limit the study  COMPARISON: October 2, 2015 and before     RESULTS:    LUMBAR SPINE: L1-L4:   BMD 1 041 gm/cm2   T-score -0 1   Z-score 0 9     LEFT TOTAL HIP:   BMD 0 790 gm/cm2   T-score -1 2   Z-score -0 6     LEFT FEMORAL NECK:   BMD 0 715 gm/cm2   T-score -1 2   Z-score -0 2             IMPRESSION:   1  Based on the [facility] classification, this study is normal and the patient is considered at low risk for fracture  2  The 10 year risk of hip fracture is 0 5 %, with the 10 year risk of major osteoporotic fracture being 8 3 %, as calculated by the WHO fracture risk assessment tool (FRAX)  The current NOF guidelines recommend treating patients with FRAX 10 year risk   score of >3% for hip fracture and >20% for major osteoporotic fracture     3  Since the prior study, there has been a significant increase in BMD in the left hip of 0 045 Gm/cm2 or 6 1%, with no significant change in the lumbar spine  This change exceeds our own least significant change and, therefore, is statistically    significant within 95% confidence level  4  A daily intake of calcium of at least 1200 mg and vitamin D, 800-1000 IU, as well as weight bearing and muscle strengthening exercise, fall prevention and avoidance of tobacco and excessive alcohol intake  5  Repeat DXA in 18 - 24 months, on the same machine, as clinically indicated         WHO CLASSIFICATION:   Normal (a T-score of -1 0 or higher)   Low bone mineral density (a T-score of less than -1 0 but higher than -2 5)   Osteoporosis (a T-score of -2 5 or less)   Severe osteoporosis (a T-score of -2 5 or less with a fragility fracture)             Workstation performed: [id number]     Signed by:   Robert Syed MD   10/17/17       Plan  H/O kidney transplant    · From  PredniSONE 5 MG Oral Tablet TAKE 1 tablet alternating with 1 5 tablets  on next day To PredniSONE 5 MG Oral Tablet TAKE 1 TABLET DAILY

## 2018-01-12 NOTE — MISCELLANEOUS
Message     Recorded as Task   Date: 06/07/2017 12:35 PM, Created By: Mary Perez   Task Name: Miscellaneous   Assigned To: Mary Perez   Regarding Patient: Geraldo Granados, Status: In Progress   Ladonna Martinez - 07 Jun 2017 12:35 PM     TASK CREATED  please move up stacey't tomasz Escobar to this month re: WILIAN in txplnt and let MJ know that renal transplant does have WILIAN   Ana Maria Stark - 08 Jun 2017 3:34 PM     TASK IN PROGRESS   Ana Maria Stark - 09 Jun 2017 11:40 AM     TASK REPLIED TO: Previously Assigned To Ana Maria Stark  I did expedite MJ's stacey't with Dr Wil Woods  I was able to place her with him on 6/13 at 4:30 pm  I did make her aware of the WILIAN within renal txplnt, however CIPRIANO states she feels as though she may have the flu  She is feeling very uncomfortable states she feels very bloated, having fevers associated with body aches  She wanted me to speak with you regarding this issue, I did tell the pt I would but should her sx worsen she should go to the ED  I spoke with the patient  She has abdominal pain, diarrhea, chills and sweats  She denies any dysuria  However, she does have a history of urosepsis which is usually rapidly progressive  Therefore, I instructed her to go to the emergency room for evaluation  I did call ahead to the emergency room to alert them      [facility] 6/9/2017     Signatures   Electronically signed by : EDGAR Sharma ; Jun 9 2017  5:33PM EST                       (Author)

## 2018-01-12 NOTE — MISCELLANEOUS
Message   Recorded as Task   Date: 02/08/2017 12:12 PM, Created By: Dustin Thakur   Task Name: Miscellaneous   Assigned To: Toy Acuña   Regarding Patient: Asmita De Los Santos, Status: In Progress   Bren Mcgrath - 08 Feb 2017 12:12 PM     TASK CREATED  re: decreased sodium and increased creat:  1) If no edema, hold furosemide  2) Sodium chloride 1 gram po QID, starting today, needs to take 4 doses today  3) Limit fluid intake to 1 2 L or less per day  4) ? any new meds or ? taking any OTC meds  5) Need Tac level  6) Stat bmp 2/9 (call office MD if Na < 129 or > 138) and repeat BMP on2/13 and 2/20   Ana Maria Stark - 08 Feb 2017 1:20 PM     TASK IN PROGRESS   Ana Maria Stark - 08 Feb 2017 1:21 PM     TASK EDITED  Left message for pt to call the office  Wen Cowart - 09 Feb 2017 4:26 PM     TASK EDITED  Left message for pt to call the office  Wen Cowart - 10 Feb 2017 10:23 AM     TASK EDITED  Left message for pt to call the office  [facility] 2/10/2017   Samuel Tay is currently admitted to [facility], Nephro has been consulted  [facility]      Active Problems    1  Abnormal EKG (794 31) (R94 31)   2  Acid reflux disease (530 81) (K21 9)   3  Allergic urticaria (708 0) (L50 0)   4  Anemia (285 9) (D64 9)   5  Antibiotic prophylaxis for dental procedure indicated due to prior joint replacement   (V58 62) (Z79 2)   6  Atrial fibrillation (427 31) (I48 91)   7  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)   8  Benign hypertensive CKD (403 10) (I12 9)   9  Bilateral leg edema (782 3) (R60 0)   10  Bruit of left carotid artery (785 9) (R09 89)   11  Cataract (366 9) (H26 9)   12  Chronic diastolic congestive heart failure (428 32,428 0) (I50 32)   13  Chronic kidney disease, stage 4 (severe) (585 4) (N18 4)   14  Cocaine abuse in remission (305 63) (F14 10)   15  Cognitive changes (799 59) (R41 89)   16  Constipation (564 00) (K56 00)   17   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, without long-term    current use of insulin (250 61) (E10 49)   18  Diabetes mellitus with nephropathy (250 40,583 81) (E11 21)   19  Diabetic Gastropathy (537 9)   20  Diabetic polyneuropathy (250 60,357 2) (E11 42)   21  Diabetic retinopathy (250 50,362 01) (E11 319)   22  Diabetic Ulcer Of The Left Foot (250 80)   23  Diastolic dysfunction (562 3) (I51 9)   24  ROD (dyspnea on exertion) (786 09) (R06 09)   25  Drug-induced Essential Tremor (333 1)   26  Dysuria (788 1) (R30 0)   27  Encephalopathy (348 30) (G93 40)   28  Encounter for screening mammogram for breast cancer (V76 12) (Z12 31)   29  Esophagitis, reflux (530 11) (K21 0)   30  External Hemorrhoids (455 3)   31  Fatigue (780 79) (R53 83)   32  FELIPE (generalized anxiety disorder) (300 02) (F41 1)   33  H/O kidney transplant (V42 0) (Z94 0)   34  Heart palpitations (785 1) (R00 2)   35  Hospital discharge follow-up (V67 59) (Z09)   36  Hyperlipidemia (272 4) (E78 5)   37  Hyperplastic colon polyp (211 3) (K63 5)   38  Hypertension (401 9) (I10)   39  Hypothyroidism (244 9) (E03 9)   40  Increased frequency of urination (788 41) (R35 0)   41  Increased white blood cell count (288 60) (D72 829)   42  Insomnia (780 52) (G47 00)   43  Irritable bowel syndrome (564 1) (K58 9)   44  Major depressive disorder, recurrent, moderate (296 32) (F33 1)   45  Memory loss (780 93) (R41 3)   46  Memory loss or impairment (780 93) (R41 3)   47  Metabolic acidosis (737 7) (E87 2)   48  MGUS (monoclonal gammopathy of unknown significance) (273 1) (D47 2)   49  Neck pain (723 1) (M54 2)   50  Need for influenza vaccination (V04 81) (Z23)   51  Nonrheumatic aortic valve insufficiency (424 1) (I35 1)   52  OAB (overactive bladder) (596 51) (N32 81)   53  Osteopenia (733 90) (M85 80)   54  Osteoporosis (733 00) (M81 0)   55  Other calculus in bladder (594 1) (N21 0)   56  Peripheral vascular disease (443 9) (I73 9)   57  Post traumatic stress disorder (309 81) (F43 10)   58  Preop general physical exam (V72 83) (Z01 818)   59  Preoperative cardiovascular examination (V72 81) (Z01 810)   60  Proteinuria (791 0) (R80 9)   61  Rectal polyp (569 0) (K62 1)   62  Recurrent UTI (599 0) (N39 0)   63  Restless legs syndrome (333 94) (G25 81)   64  Secondary hyperparathyroidism, renal (588 81) (N25 81)   65  Sinus Tachycardia (427 89)   66  Snoring (786 09) (R06 83)   67  SOB (shortness of breath) (786 05) (R06 02)   68  Tremor (781 0) (R25 1)   69  Unsteady gait (781 2) (R26 81)    Current Meds   1  AmLODIPine Besylate 10 MG Oral Tablet; take 1/2 tab  BID; Therapy: 84ZRN1491 to (Evaluate:30Apr2017)  Requested for: 95STK0615 Recorded   2  ARIPiprazole 30 MG Oral Tablet; TAKE 1 TABLET AT BEDTIME; Therapy: 21WXZ0290 to (Evaluate:10Dbi3628)  Requested for: 09XNF9041; Last   Rx:16Nov2016 Ordered   3  Aspirin 81 MG TABS; TAKE 1 TABLET DAILY; Therapy: 09EEC1303 to (Evaluate:19Jun2016) Recorded   4  Caltrate 600 TABS; TAKE 1 TABLET TWICE DAILY; Therapy: (Recorded:17Jun2016) to Recorded   5  Catapres 0 1 MG Oral Tablet (CloNIDine HCl); take 3 tab  in am, 2 tab at noon, 3 tab  at   night; Therapy: 73ZJL7052 to  Requested for: 22ERI5201 Recorded   6  Clindamycin HCl - 300 MG Oral Capsule; TAKE 2 CAPSULES 1 HOUR PRIOR TO   DENTAL APPOINTMENT; Therapy: 03ZBI1133 to (Evaluate:21Oct2016)  Requested for: 90DDE3767; Last   Rx:20Oct2016 Ordered   7  Doxazosin Mesylate 1 MG Oral Tablet; TAKE 1 TABLET AT BEDTIME; Therapy: (Recorded:17Jun2016) to Recorded   8  DULoxetine HCl - 60 MG Oral Capsule Delayed Release Particles (Cymbalta); TAKE 1   CAPSULE TWICE DAILY; Therapy: 69RBY5930 to (Evaluate:58Xfo4648)  Requested for: 40DLX2686; Last   Rx:09Nov2016 Ordered   9  Folic Acid 1 MG Oral Tablet; TAKE 1 TABLET DAILY AS DIRECTED; Therapy: 96NGT7192 to (Evaluate:07Oct2017)  Requested for: 94FZW2303; Last   Rx:12Oct2016 Ordered   10   Furosemide 40 MG Oral Tablet; TAKE 1 TO 2 TABLETS DAILY AS NEEDED FOR EDEMA; Therapy: 80IET9102 to (Evaluate:25Jun2017)  Requested for: 27EQQ0288; Last    Rx:30Jun2016 Ordered   11  HydrALAZINE HCl - 50 MG Oral Tablet; TAKE 1 TABLET 3 TIMES DAILY; Therapy: 05FQW9945 to (Evaluate:26Jan2017)  Requested for: 19IDC3601; Last    Rx:00Hgc6345 Ordered   12  Lactulose 10 GM/15ML Oral Solution; TAKE 30 ML DAILY; Therapy: (Recorded:17Jun2016) to Recorded   13  Levothyroxine Sodium 88 MCG Oral Tablet; take 1 tablet by mouth daily; Therapy: 79ITR9901 to (Evaluate:15Mar2017)  Requested for: 30KJY0238; Last    Rx:80Xby0737 Ordered   14  LORazepam 2 MG Oral Tablet; TAKE 1 TABLET 3 TIMES DAILY AS NEEDED; Therapy: 42VJX8147 to (Evaluate:15May2017)  Requested for: 93OAG3319; Last    Rx:16Nov2016 Ordered   15  Magnesium 200 MG Oral Tablet; Therapy: 77PJC2820 to Recorded   16  Magnesium TABS; Therapy: (Recorded:07Oct2016) to Recorded   17  Metoprolol Tartrate 50 MG Oral Tablet; take 1 tablet by mouth twice daily; Therapy: 10HRR3909 to (Evaluate:09Jun2017)  Requested for: 24ZQL3944; Last    Rx:14Jun2016 Ordered   18  OneTouch Ultra Blue In [employer]; TEST TWICE A DAY; Therapy: 20JWL6016 to (Evaluate:08Jul2016)  Requested for: 83PHD6072; Last    Rx:10Mar2016 Ordered   19  OneTouch UltraSoft Lancets Miscellaneous; test twice daily; Therapy: [facility]KBA7590 [facility])  Requested for: 67BAE6306; Last    Rx:09Mar2016 Ordered   20  Pantoprazole Sodium 40 MG Oral Tablet Delayed Release; take 1 tablet by mouth every    day; Therapy: 99KMR0498 to (Evaluate:92Vyg1807)  Requested for: 62BFS0822; Last    Rx:10Jul2016 Ordered   21  Pravastatin Sodium 80 MG Oral Tablet; take 1 tablet by mouth every day; Therapy: 49ACN0407 to (Evaluate:06Cro7084)  Requested for: 59YMW4115; Last    Rx:22Jan2017 Ordered   22  PredniSONE 5 MG Oral Tablet; TAKE ONE AND 1/2 TABLETS BY MOUTH DAILY;     Therapy: 10RTY0108 to (Shelly Harrison)  Requested for: 86IMW4558; Last    AS:91ZUV6458 Ordered   23  ROPINIRole HCl - 0 25 MG Oral Tablet; take 1 tablet by mouth twice daily; Therapy: 09IHM8953 to (Evaluate:50Wai2141)  Requested for: 21FBI9503; Last    Rx:30Jun2016 Ordered   24  Sertraline HCl - 100 MG Oral Tablet; TAKE 2 TABLETS DAILY; Therapy: 42LIR1532 to (Evaluate:32Hrw8014)  Requested for: 86RII4514; Last    Rx:16Nov2016 Ordered   25  Sodium Bicarbonate 650 MG Oral Tablet; Take one tablet daily; Therapy: 65CVY1283 to (Evaluate:12Apr2016)  Requested for: 97JGC6744 Recorded   26  Tacrolimus 1 MG Oral Capsule (Prograf); Take 4 in the morning and 3 in the evening     Requested for: 64UCN0207; Last Rx:08Feb2017 Ordered   27  TraZODone HCl - 150 MG Oral Tablet; TAKE 1 TABLET AT BEDTIME; Therapy: 41OFU7848 to (Evaluate:07Feb2017)  Requested for: 68XUV7817; Last    Rx:09Nov2016 Ordered   28  Vitamin D3 1000 UNIT Oral Capsule; TAKE 3 PILLS AT NOON BY MOUTH; Therapy: (Recorded:17Jun2016) to Recorded    Allergies    1  Penicillins   2  Morphine Derivatives   3  Duricef TABS   4   Myrbetriq IZ80    Signatures   Electronically signed by : EDGAR Mari ; Feb 15 2017 12:28PM EST

## 2018-01-12 NOTE — RESULT NOTES
Message   Tegretol was level appropriate  Attempt to maintain increased fluid intake  Repeat blood work 1-2 weeks  Verified Results  (1)  TACROLIMUS 55NPN1775 06:30AM Pebbles Manriquez    Order Number: [id number]     Test Name Result Flag Reference    6 9 ng/mL  2 0 - 20 0   Trough (immediately following                  transplant)                       15 0          Trough (steady state, 2 weeks or                  more after transplant):      3 0 - 8 0                                   Detection Limit = 1 0          Performed by LC-MS/MS technology      Performed at:  [address]  [id number]  : Mulu Arriaga MD, Phone:  [phone]

## 2018-01-12 NOTE — MISCELLANEOUS
Message   Recorded as Task   Date: 03/21/2016 12:26 PM, Created By: Micah Young   Task Name: Miscellaneous   Assigned To: Cheryl Blackmon   Regarding Patient: Abhinav Gregory, Status: In Progress   Germán Chance - 21 Mar 2016 12:26 PM     TASK CREATED  Based on blood pressure log that was sent in, start Cardura at 1 mg by mouth at bedtime  Send in her blood pressures in 2 weeks' time  Also, please see if Raquel Jimenez is still on Carafate  If she is, we likely need to discontinue this but check with her wherever prescribed it to see if that is okay  Cheryl Blackmon - 21 Mar 2016 1:56 PM     TASK REASSIGNED: Previously Assigned To Lou Vazquez - 21 Mar 2016 2:02 PM     TASK EDITED  I spoke with the patient and she is aware of the above  I sent the script for Cardura in [facility]  Cheryl Blackmon - 21 Mar 2016 2:02 PM     TASK IN PROGRESS   I spoke with Dr Fransico Spence and he states that it is okay from his standpoint to discontinue the Carafate  The patient is aware  kja      Active Problems    1  Abnormal EKG (794 31) (R94 31)   2  Acid reflux disease (530 81) (K21 9)   3  Acute on chronic diastolic congestive heart failure (428 33,428 0) (I50 33)   4  Anemia (285 9) (D64 9)   5  Atrial fibrillation (427 31) (I48 91)   6  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)   7  Benign hypertensive CKD (403 10) (I12 9)   8  Bilateral leg edema (782 3) (R60 0)   9  Bruit of left carotid artery (785 9) (R09 89)   10  Cataract (366 9) (H26 9)   11  Chronic diastolic congestive heart failure (428 32,428 0) (I50 32)   12  Chronic kidney disease, stage 3 (585 3) (N18 3)   13  Cocaine abuse in remission (305 63) (F14 10)   14  Cognitive changes (799 59) (R41 89)   15  Constipation (564 00) (K59 00)   16  Diabetic Gastropathy (537 9)   17  Diabetic Nephropathy (583 81)   18  Diabetic polyneuropathy (250 60,357 2) (E11 42)   19  Diabetic retinopathy (250 50,362 01) (E11 319)   20  Diabetic Ulcer Of The Left Foot (250 80)   21  ROD (dyspnea on exertion) (786 09) (R06 09)   22  Drug-induced Essential Tremor (333 1)   23  Encounter for screening mammogram for breast cancer (V76 12) (Z12 31)   24  Esophagitis, reflux (530 11) (K21 0)   25  External Hemorrhoids (455 3)   26  FELIPE (generalized anxiety disorder) (300 02) (F41 1)   27  H/O kidney transplant (V42 0) (Z94 0)   28  Heart palpitations (785 1) (R00 2)   29  Hyperlipidemia (272 4) (E78 5)   30  Hyperplastic colon polyp (211 3) (K63 5)   31  Hypertension (401 9) (I10)   32  Hypothyroidism (244 9) (E03 9)   33  Increased white blood cell count (288 60) (D72 829)   34  Insomnia (780 52) (G47 00)   35  Irritable bowel syndrome (564 1) (K58 9)   36  Major depressive disorder, recurrent episode, in full remission (296 36) (F33 42)   37  Memory loss (780 93) (R41 3)   38  Metabolic acidosis (391 6) (E87 2)   39  MGUS (monoclonal gammopathy of unknown significance) (273 1) (D47 2)   40  Neck pain (723 1) (M54 2)   41  Nonrheumatic aortic valve insufficiency (424 1) (I35 1)   42  Osteopenia (733 90) (M85 80)   43  Osteoporosis (733 00) (M81 0)   44  Peripheral vascular disease (443 9) (I73 9)   45  Post traumatic stress disorder (309 81) (F43 10)   46  Proteinuria (791 0) (R80 9)   47  Rectal polyp (569 0) (K62 1)   48  Restless legs syndrome (333 94) (G25 81)   49  Secondary hyperparathyroidism, renal (588 81) (N25 81)   50  Sinus Tachycardia (427 89)   51  SOB (shortness of breath) (786 05) (R06 02)   52  Tremor (781 0) (R25 1)   53  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 (713 21) (R04 91)    Current Meds   1  ARIPiprazole 20 MG Oral Tablet; TAKE 1 TABLET AT BEDTIME; Therapy: 02XJG5019 to (Evaluate:18Mqv7942)  Requested for: 32VGY0838; Last   Rx:18Feb2016 Ordered   2  Aspirin 81 MG Oral Tablet; TAKE 1 TABLET DAILY; Therapy: 53LFD7949 to (Evaluate:19Jun2016) Recorded   3   Carafate 1 GM/10ML Oral Suspension; Therapy: (0699 982 13 20) to Recorded   4  Citracal TABS; Take one tablet twice daily Recorded   5  CloNIDine HCl - 0 1 MG Oral Tablet; Take three tablets every morning, two tablets every   noon, and three tablets every evening; Therapy: 47YYO4586 to (Evaluate:10Feb2017)  Requested for: 82RNV5985; Last   Rx:16Feb2016 Ordered   6  Doxazosin Mesylate 1 MG Oral Tablet (Cardura); TAKE 1 TABLET Bedtime; Therapy: 03ZDE6656 to (Evaluate:16Mar2017)  Requested for: 21Mar2016; Last   Rx:21Mar2016 Ordered   7  DULoxetine HCl - 60 MG Oral Capsule Delayed Release Particles (Cymbalta); TAKE 1   CAPSULE TWICE DAILY; Therapy: 65SEX9364 to (Evaluate:09Aug2016)  Requested for: 60NXP4926; Last   Rx:11Feb2016 Ordered   8  Folic Acid 1 MG Oral Tablet; TAKE 1 TABLET DAILY AS DIRECTED; Therapy: 30NBE5813 to (Evaluate:14Sep2016)  Requested for: 40ZKG5803; Last   Rx:20Sep2015 Ordered   9  Furosemide 40 MG Oral Tablet; Take one tablet every morning and 1/2 tablet every   evening; Therapy: 73ZOH1720 to (Theotis Prude)  Requested for: 26CII8034 Recorded   10  Generlac 10 GM/15ML SYRP Recorded   11  HydrALAZINE HCl - 50 MG Oral Tablet; TAKE 1 TABLET 3 TIMES DAILY; Therapy: 39SVY3043 to (Evaluate:26Jan2017)  Requested for: 68MLT8475; Last    Rx:84Eda8133 Ordered   12  Hydrocodone-Acetaminophen 5-325 MG Oral Tablet; TAKE 1 TABLET EVERY 6 HOURS    AS NEEDED FOR PAIN;    Therapy: 68YRJ5274 to (Evaluate:07Jan2016); Last Rx:96Oxk0578 Ordered   13  Levothyroxine Sodium 88 MCG Oral Tablet (Synthroid); take 1 tablet every day; Therapy: 89EUD6859 to (Last Rx:02Feb2016)  Requested for: 02Feb2016 Ordered   14  LORazepam 2 MG Oral Tablet; TAKE 1 TABLET TWICE DAILY AS NEEDED; Therapy: 69ZST4188 to (Evaluate:09Aug2016); Last Rx:11Feb2016 Ordered   15  Magnesium  (64 Mg) MG TBCR; TAKE 1 TABLET DAILY; Therapy: 16JWN4751 to (Evaluate:02Dec2013); Last Rx:05Jun2013 Ordered   16   Metoprolol Tartrate 50 MG Oral Tablet; take 1 tablet by mouth twice daily; Therapy: 46FMD9389 to (Evaluate:06Jun2016)  Requested for: 13WCT9384; Last    Rx:08Mar2016 Ordered   17  OneTouch Ultra Blue In [employer]; TEST TWICE A DAY; Therapy: 06RVT7640 to (Evaluate:23Thd7493)  Requested for: 53SJI5876; Last    Rx:10Mar2016 Ordered   18  OneTouch UltraSoft Lancets Miscellaneous; test twice daily; Therapy: 27ZXB8512 to (Gerardo Dayton)  Requested for: 80EXA2244; Last    Rx:09Mar2016 Ordered   19  Pantoprazole Sodium 40 MG Oral Tablet Delayed Release; TAKE 1 TABLET DAILY; Therapy: 22PNR6711 to (Evaluate:02Apr2016)  Requested for: 14BAH3212; Last    Rx:05Oct2015 Ordered   20  Pravastatin Sodium 80 MG Oral Tablet; take 1 tablet by mouth every day; Therapy: 61EIQ7376 to (Evaluate:06Dqp4810)  Requested for: 49Kyf4135; Last    Rx:19Apr2015 Ordered   21  PredniSONE 5 MG Oral Tablet; Take 1-1/2 tablets daily; Therapy: 69PJO2102 to (Evaluate:14Mar2016)  Requested for: 20Mar2015; Last    Rx:20Mar2015 Ordered   22  ROPINIRole HCl - 0 25 MG Oral Tablet; take 1 tablet by mouth twice daily; Therapy: 26NMR8037 to (Evaluate:11Shj2764)  Requested for: 70WDS4986; Last    Rx:22Mar2016 Ordered   23  Sertraline HCl - 100 MG Oral Tablet; TAKE 2 TABLETS DAILY; Therapy: 61GTE4997 to (Evaluate:63Feu7158)  Requested for: 92PXE6325; Last    Rx:65Uxm9521 Ordered   24  Sodium Bicarbonate 650 MG Oral Tablet; Take one tablet daily; Therapy: 26FQY8440 to (Evaluate:12Apr2016)  Requested for: 74DKO3844 Recorded   25  Tacrolimus 1 MG Oral Capsule; TAKE 4 CAPSULES every morning and 3 capsules every    evening; Therapy: 73AHL6604 to (Evaluate:99Ggq7905)  Requested for: 74UZG7842; Last    Rx:04Mar2016 Ordered   26  TraZODone HCl - 150 MG Oral Tablet; TAKE 1 TABLET AT BEDTIME; Therapy: 56UQA8053 to (Evaluate:52Mxp8746)  Requested for: 27RQZ8000; Last    Rx:23Cvo6822 Ordered   27  Vitamin D3 3000 UNIT Oral Tablet; 1 TAB DAILY;     Therapy: 97TPE9600 to (Evaluate:17Yrg6816) Recorded    Allergies    1  Penicillins   2  Morphine Derivatives   3   Juan Haider    Signatures   Electronically signed by : EDGAR Edouard ; Mar 23 2016  4:12PM EST

## 2018-01-13 VITALS
BODY MASS INDEX: 33.23 KG/M2 | HEIGHT: 68 IN | RESPIRATION RATE: 16 BRPM | SYSTOLIC BLOOD PRESSURE: 160 MMHG | WEIGHT: 219.25 LBS | TEMPERATURE: 99.1 F | OXYGEN SATURATION: 96 % | HEART RATE: 87 BPM | DIASTOLIC BLOOD PRESSURE: 62 MMHG

## 2018-01-13 VITALS
HEART RATE: 68 BPM | OXYGEN SATURATION: 94 % | SYSTOLIC BLOOD PRESSURE: 122 MMHG | DIASTOLIC BLOOD PRESSURE: 64 MMHG | BODY MASS INDEX: 32.74 KG/M2 | WEIGHT: 216 LBS | HEIGHT: 68 IN | RESPIRATION RATE: 18 BRPM | TEMPERATURE: 98 F

## 2018-01-13 VITALS
RESPIRATION RATE: 50 BRPM | BODY MASS INDEX: 34.01 KG/M2 | WEIGHT: 224.4 LBS | DIASTOLIC BLOOD PRESSURE: 62 MMHG | HEART RATE: 68 BPM | HEIGHT: 68 IN | TEMPERATURE: 97.8 F | SYSTOLIC BLOOD PRESSURE: 164 MMHG

## 2018-01-13 VITALS
BODY MASS INDEX: 33.04 KG/M2 | HEIGHT: 68 IN | WEIGHT: 218 LBS | HEART RATE: 85 BPM | SYSTOLIC BLOOD PRESSURE: 154 MMHG | DIASTOLIC BLOOD PRESSURE: 78 MMHG

## 2018-01-13 VITALS — WEIGHT: 230.6 LBS | BODY MASS INDEX: 35.58 KG/M2

## 2018-01-13 NOTE — MISCELLANEOUS
Assessment    1  UTI (urinary tract infection) (599 0) (N39 0)   2  Acute kidney injury superimposed on CKD (866 00,585 9) (N17 9,N18 9)   3  Benign hypertensive CKD (403 10) (I12 9)   4  Chronic kidney disease, stage 4 (severe) (585 4) (N18 4)   5  Chronic diastolic congestive heart failure (428 32,428 0) (I50 32)   6  H/O kidney transplant (V42 0) (Z94 0)   7  Hyponatremia (276 1) (E87 1)   8  Constipation (564 00) (K59 00)   9  Hypothyroidism (244 9) (E03 9)    Plan  Constipation    · Lactulose 10 GM/15ML Oral Solution; TAKE 30 ML DAILY   Rx By: Aleja Vinson; Dispense: 0 Days ; #:1 X 500 ML Bottle; Refill: 3; For: Constipation; JACK = N; Verified Transmission to [address] [place] [address]; Last Updated By: SystemNeed Fixed; 2/21/2017 11:08:05 AM  Hypothyroidism    · Levothyroxine Sodium 88 MCG Oral Tablet; take 1 tablet by mouth daily   Rx By: Aleja Vinson; Dispense: 30 Days ; #:90 TAB; Refill: 3; For: Hypothyroidism; JACK = N; Verified Transmission to [address] [place] [address]    Discussion/Summary  Discussion Summary:   Patient presents for [facility] visit  She was hospitalized at [address] [place] [address] 2/8/17- 2/11/17 for UTI, acute on chronic renal failure, metabolic acidosis  Patient completed antibiotic therapy for UTI   Denies urinary symptoms  Will continue all current medications for HTN  Patient will follow-up with nephrology Dr Cristy Singleton in April 2017 for CKD stage 4, hyponatremia, anemia  Recommended to stop iron supplements due to constipation  Rx was refilled for Lactulose  Will continue Levothyroxine 88 mcg daily for Hypothyroidism  Encouraged patient to follow a low-cholesterol diet, regular exercise  Continue to use a cane for ambulation to prevent falls  Medication SE Review and Pt Understands Tx: Possible side effects of new medications were reviewed with the patient/guardian today  The treatment plan was reviewed with the patient/guardian   The patient/guardian understands and agrees with the treatment plan      Chief Complaint  Chief Complaint Free Text Note Form: Moustapha Thacker is a 45 y o  female patient who originally presented to the hospital on 2/8/2017 due to dysuria, suprapubic pain, and generalized weakness and fatigue  History of Present Illness  TCM Communication [facility]: The patient is being contacted for follow-up after hospitalization  She was hospitalized at [facility]  The dates of hospitalization: 02/08/2017-02/11/2017, date of admission: 02/08/17, date of discharge: 02/11/17  Diagnosis: UTI (urinary tract infection)  She was discharged to home  Medications were not reviewed today  She scheduled a follow up appointment  Counseling was provided to the patient  Topics counseled included importance of compliance with treatment  Communication performed and completed by Vickie Liang CMA   HPI: Patient presents for [facility] visit  She was hospitalized at [address] [place] [address] 2/8/17- 2/11/17 for UTI, acute on chronic renal failure, metabolic acidosis  Patient has history of recurrent UTIs  She presented to the hospital with dysuria, suprapubic pain, generalized weakness and fatigue  Patient was treated with bicarb drip, IV fluids, IV Rocephin  Urine culture was positive for E  coli  Patient's condition improved  She was discharged home with additional 7 days of Keflex which she completed 2 days ago  Patient states that she feels much better  Denies fever  No urinary symptoms  Fatigue improved  Patient had a low-sodium level while in the hospital    She was seen by nephrology for hospital followup yesterday, was recommended to have only 60 oz of fluid per day  Patient had arterial renal Doppler done on 2/9/17 which showed no evidence of significant arterial occlusive disease in main renal artery  Reviewed all discharge medications, test results with patient  Hb 11 7 from February 20, 2017   Sodium 135, potassium 4 7, creatinine 2 14    Patient has CKD stage 4, H/o kidney transplant in 1998  She will follow-up with nephrologist Dr Neal Mejia in April  Patient states that she takes Vesicare 5 mg for overactive bladder with improvement in symptoms  Patient has anemia, GERD  She is no longer taking Protonix  Denies heartburn  C/o constipation due to taking iron pills, requesting refill for Lactulose  Review of Systems  Complete-Female:   Constitutional: no fever and no chills  Fatigue improved  Eyes: wears glasses, but no eye pain, no dryness of the eyes, eyes not red, no purulent discharge from the eyes and no itching of the eyes  No visual disturbances  ENT: no earache, no nosebleeds, no sore throat, no hearing loss, no nasal discharge and no hoarseness  Cardiovascular: no chest pain, no intermittent leg claudication, no palpitations and no lower extremity edema  Respiratory: mild stable SOB with exertion, but no wheezing    The patient presents with complaints of no cough  Gastrointestinal: constipation, but no abdominal pain, no nausea, no vomiting, no diarrhea and no blood in stools  No heartburn  Genitourinary: no dysuria, no pelvic pain and no incontinence  Musculoskeletal: arthralgias, but no joint swelling and no myalgias  Integumentary: no rashes, no itching and no skin wound  Neurological: tingling, numbness in legs, but no headache    The patient presents with complaints of no dizziness  Psychiatric: anxiety, sleep disturbances, depression and symptoms are stable, but not suicidal    Hematologic/Lymphatic: a tendency for easy bruising, but no swollen glands, no tendency for easy bleeding and no swollen glands in the neck  Active Problems     1  Abnormal EKG (794 31) (R94 31)   2  Acid reflux disease (530 81) (K21 9)   3  Allergic urticaria (708 0) (L50 0)   4  Anemia (285 9) (D64 9)   5   Antibiotic prophylaxis for dental procedure indicated due to prior joint replacement   (V58 62) (Z79 2)   6  Atrial fibrillation (427 31) (I48 91)   7  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)   8  Bruit of left carotid artery (785 9) (R09 89)   9  Cataract (366 9) (H26 9)   10  Chronic diastolic congestive heart failure (428 32,428 0) (I50 32)   11  Cocaine abuse in remission (305 63) (F14 10)   12  Cognitive changes (799 59) (R41 89)   13  Constipation (564 00) (K59 00)   14  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, without long-term    current use of insulin (250 61) (E10 49)   15  Diabetes mellitus with nephropathy (250 40,583 81) (E11 21)   16  Diabetic Gastropathy (537 9)   17  Diabetic polyneuropathy (250 60,357 2) (E11 42)   18  Diabetic retinopathy (250 50,362 01) (E11 319)   19  Diabetic Ulcer Of The Left Foot (250 80)   20  Diastolic dysfunction (295 1) (I51 9)   21  ROD (dyspnea on exertion) (786 09) (R06 09)   22  Drug-induced Essential Tremor (333 1)   23  Dysuria (788 1) (R30 0)   24  Encephalopathy (348 30) (G93 40)   25  Encounter for screening mammogram for breast cancer (V76 12) (Z12 31)   26  Esophagitis, reflux (530 11) (K21 0)   27  External Hemorrhoids (455 3)   28  Fatigue (780 79) (R53 83)   29  FELIPE (generalized anxiety disorder) (300 02) (F41 1)   30  Heart palpitations (785 1) (R00 2)   31  Hospital discharge follow-up (V67 59) (Z09)   32  Hyperlipidemia (272 4) (E78 5)   33  Hyperplastic colon polyp (211 3) (K63 5)   34  Hypertension (401 9) (I10)   35  Hypothyroidism (244 9) (E03 9)   36  Increased frequency of urination (788 41) (R35 0)   37  Increased white blood cell count (288 60) (D72 829)   38  Insomnia (780 52) (G47 00)   39  Irritable bowel syndrome (564 1) (K58 9)   40  Major depressive disorder, recurrent, moderate (296 32) (F33 1)   41  Memory loss (780 93) (R41 3)   42  Memory loss or impairment (780 93) (R41 3)   43  Metabolic acidosis (979 9) (E87 2)   44   MGUS (monoclonal gammopathy of unknown significance) (273 1) (D47 2) 45  Neck pain (723 1) (M54 2)   46  Need for influenza vaccination (V04 81) (Z23)   47  Nonrheumatic aortic valve insufficiency (424 1) (I35 1)   48  OAB (overactive bladder) (596 51) (N32 81)   49  Osteopenia (733 90) (M85 80)   50  Osteoporosis (733 00) (M81 0)   51  Other calculus in bladder (594 1) (N21 0)   52  Peripheral vascular disease (443 9) (I73 9)   53  Post traumatic stress disorder (309 81) (F43 10)   54  Preop general physical exam (V72 83) (Z01 818)   55  Preoperative cardiovascular examination (V72 81) (Z01 810)   56  Proteinuria (791 0) (R80 9)   57  Rectal polyp (569 0) (K62 1)   58  Recurrent UTI (599 0) (N39 0)   59  Restless legs syndrome (333 94) (G25 81)   60  Secondary hyperparathyroidism, renal (588 81) (N25 81)   61  Sinus Tachycardia (427 89)   62  Snoring (786 09) (R06 83)   63  SOB (shortness of breath) (786 05) (R06 02)   64  Tremor (781 0) (R25 1)   65  Unsteady gait (781 2) (R26 81)    H/O kidney transplant (V42 0) (Z94 0)       Benign hypertensive CKD (403 10) (I12 9)       Chronic kidney disease, stage 4 (severe) (585 4) (N18 4)       Bilateral leg edema (782 3) (R60 0)          Past Medical History     1  History of Abnormal Liver Function Test (790 6)   2  History of Abnormal urine (791 9) (R82 90)   3  History of Abscess of buttock, right (682 5) (L02 31)   4  History of Acute on chronic diastolic congestive heart failure (428 33,428 0) (I50 33)   5  History of Cervical Dysplasia (V13 22)   6  History of Denial Of Any Significant Medical History   7  History of Diabetes mellitus with foot ulcer (250 80,707 15) (E11 621,L97 509)   8  History of cholelithiasis (V12 79) (Z87 19)   9  History of gastritis (V12 79) (Z87 19)   10  Denied: History of head injury   11  History of hematuria (V13 09) (Z87 448)   12  History of hyperkalemia (V12 29) (Z86 39)   13  History of pneumonia (V12 61) (Z87 01)   14  History of seborrhea (V13 3) (Z87 2)   15   History of Iliotibial band syndrome (728 89) (M76 30)   16  History of Infected tooth (522 4) (K04 7)   17  History of Lumbar radiculopathy (724 4) (M54 16)   18  History of Nonrheumatic aortic valve insufficiency (424 1) (I35 1)   19  Denied: History of Seizures   20  History of Trochanteric bursitis, unspecified laterality (726 5) (M70 60)   21  History of Urinary Tract Infection (V13 02)   22  History of UTI (urinary tract infection), bacterial (599 0,041 9) (N39 0,A49 9)   23  History of Vaginal itching (698 1) (L29 8)    History of Hyponatremia (276 1) (E87 1)          Surgical History    1  History of Cataract Surgery   2  History of Cholecystectomy   3  History of Complete Colonoscopy   4  History of Complete Colonoscopy   5  History of Diagnostic Esophagogastroduodenoscopy   6  History of Diagnostic Esophagogastroduodenoscopy   7  History of Dilation And Curettage   8  History of Foot Surgery   9  History of Pancreatic Transplantation   10  Renal Transplant   11  History of Surgery Left Foot Amputation  Surgical History Reviewed: The surgical history was reviewed and updated today  Family History  Mother    1  No family history of mental disorder  Father    2  Family history of cancer (V16 9) (Z80 9)   3  No family history of mental disorder  Sister    4  Family history of depression (V17 0) (Z81 8)  Grandparent    5  Family history of cancer (V16 9) (Z80 9)  Maternal Grandmother    6  Family history of Breast Cancer (V16 3)    Social History    · Denied: History of Alcohol Use (History)   · Former smoker (O93 32) (N70 546)   · History of Uses cocaine  Social History Reviewed: The social history was reviewed and updated today  Current Meds   1  AmLODIPine Besylate 10 MG Oral Tablet; take 1/2 tab  BID; Therapy: 87BMK7886 to (Evaluate:31Fik1769)  Requested for: 06SCW3612 Recorded   2  ARIPiprazole 30 MG Oral Tablet; TAKE 1 TABLET AT BEDTIME;    Therapy: 46GMU3836 to (Evaluate:86Akb1320)  Requested for: 84LIZ6325; Last Rx:16Nov2016 Ordered   3  Aspirin 81 MG TABS; TAKE 1 TABLET DAILY; Therapy: 97DAT4013 to (Evaluate:19Jun2016) Recorded   4  Caltrate 600 TABS; TAKE 1 TABLET TWICE DAILY; Therapy: (Recorded:17Jun2016) to Recorded   5  Catapres 0 1 MG Oral Tablet; take 3 tab  in am, 2 tab at noon, 3 tab  at night; Therapy: 03REO3480 to  Requested for: 37AKC0117 Recorded   6  Clindamycin HCl - 300 MG Oral Capsule; TAKE 2 CAPSULES 1 HOUR PRIOR TO   DENTAL APPOINTMENT; Therapy: 99AAW5688 to (Evaluate:21Oct2016)  Requested for: 61LKE7429; Last   Rx:20Oct2016 Ordered   7  Doxazosin Mesylate 1 MG Oral Tablet; TAKE 1 TABLET AT BEDTIME; Therapy: (Recorded:17Jun2016) to Recorded   8  DULoxetine HCl - 60 MG Oral Capsule Delayed Release Particles; TAKE 1 CAPSULE   TWICE DAILY; Therapy: 28BCA2068 to (Evaluate:07Feb2017)  Requested for: 03MMD4966; Last   Rx:09Nov2016 Ordered   9  Folic Acid 1 MG Oral Tablet; TAKE 1 TABLET DAILY AS DIRECTED; Therapy: 76PDP7335 to (Evaluate:07Oct2017)  Requested for: 88JIO6948; Last   Rx:12Oct2016 Ordered   10  Furosemide 20 MG Oral Tablet; take 1/2 tab  daily; Therapy: 36Jzb8360 to Recorded   11  HydrALAZINE HCl - 50 MG Oral Tablet; TAKE 1 TABLET 3 TIMES DAILY; Therapy: 78FUO3885 to (Evaluate:26Jan2017)  Requested for: 22TRT9347; Last    Rx:89Ici7349 Ordered   12  Lactulose 10 GM/15ML Oral Solution; TAKE 30 ML DAILY; Therapy: (Recorded:21Feb2017) to Recorded   13  Levothyroxine Sodium 88 MCG Oral Tablet; take 1 tablet by mouth daily; Therapy: 53MXK2286 to (Evaluate:15Mar2017)  Requested for: 02SQS0555; Last    Rx:27Gcs3656 Ordered   14  LORazepam 2 MG Oral Tablet; TAKE 1 TABLET 3 TIMES DAILY AS NEEDED; Therapy: 36MSL4443 to (Evaluate:15May2017)  Requested for: 70BXD3061; Last    Rx:16Nov2016 Ordered   15  Magnesium 200 MG Oral Tablet; Therapy: 21QVT0085 to Recorded   16  Magnesium TABS; Therapy: (Recorded:48Syg4477) to Recorded   17   Metoprolol Tartrate 50 MG Oral Tablet; take 1 tablet by mouth twice daily; Therapy: 07BGP5107 to (Evaluate:09Jun2017)  Requested for: 78FXV1068; Last    Rx:14Jun2016 Ordered   18  OneTouch Ultra Blue In [employer]; TEST TWICE A DAY; Therapy: 80WRT9735 to (Evaluate:87Vai6994)  Requested for: 89YYD6995; Last    Rx:10Mar2016 Ordered   19  OneTouch UltraSoft Lancets Miscellaneous; test twice daily; Therapy: 29RFR9456 to (Mike Douglas)  Requested for: 20JKE4842; Last    Rx:09Mar2016 Ordered   20  Pantoprazole Sodium 40 MG Oral Tablet Delayed Release; take 1 tablet by mouth every    day; Therapy: 42XAZ4608 to (Evaluate:05Jgo0386)  Requested for: 72DBC5391; Last    Rx:10Jul2016 Ordered   21  Pravastatin Sodium 80 MG Oral Tablet; take 1 tablet by mouth every day; Therapy: 00ANV6655 to (Evaluate:48Sxv1634)  Requested for: 88FLO7503; Last    Rx:22Jan2017 Ordered   22  PredniSONE 5 MG Oral Tablet; TAKE ONE AND 1/2 TABLETS BY MOUTH DAILY; Therapy: 77VKD1329 to (Patti Cardozo)  Requested for: 96QHC6346; Last    Rx:24May2016 Ordered   23  ROPINIRole HCl - 0 25 MG Oral Tablet; take 1 tablet by mouth twice daily; Therapy: 74WTS2028 to (Evaluate:42Jxn6908)  Requested for: 82FQE7998; Last    Rx:30Jun2016 Ordered   24  Sertraline HCl - 100 MG Oral Tablet; TAKE 2 TABLETS DAILY; Therapy: 31ZKB8383 to (Evaluate:76Xzk8595)  Requested for: 68LLQ6246; Last    Rx:16Nov2016 Ordered   25  Sodium Bicarbonate 650 MG Oral Tablet; Take one tablet daily; Therapy: 80KZV5004 to (Evaluate:12Apr2016)  Requested for: 84QSJ9095 Recorded   26  Tacrolimus 1 MG Oral Capsule; Take 4 in the morning and 3 in the evening  Requested    for: 86CNN3088; Last Rx:08Feb2017 Ordered   27  TraZODone HCl - 150 MG Oral Tablet; TAKE 1 TABLET AT BEDTIME; Therapy: 91ZBT3818 to (Evaluate:97Riw4969)  Requested for: 63WVL5802; Last    Rx:09Nov2016 Ordered   28  Vitamin D3 1000 UNIT Oral Capsule; TAKE 3 PILLS AT NOON BY MOUTH;     Therapy: (Recorded:17Jun2016) to Recorded  Medication List Reviewed: The medication list was reviewed and updated today  Allergies    1  Penicillins   2  Morphine Derivatives   3  Duricef TABS   4  Myrbetriq TB24    Vitals  Signs   Recorded: 53OZE7756 31:61JV   Systolic: 317  Diastolic: 60  Recorded: 14CXU7252 10:27AM   Temperature: 97 9 F  Heart Rate: 80  Respiration: 16  Systolic: 865  Diastolic: 58  Height: 5 ft 7 5 in  Weight: 220 lb   BMI Calculated: 33 95  BSA Calculated: 2 12  Pain Scale: 0    Physical Exam    Constitutional   General appearance: No acute distress, well appearing and well nourished  Obese  Eyes   Conjunctiva and lids: No swelling, erythema or discharge  Pupils and irises: Equal, round and reactive to light  Ears, Nose, Mouth, and Throat   External inspection of ears and nose: Normal     Otoscopic examination: Tympanic membranes translucent with normal light reflex  Canals patent without erythema  Oropharynx: Normal with no erythema, edema, exudate or lesions  Pulmonary   Respiratory effort: No increased work of breathing or signs of respiratory distress  Auscultation of lungs: Clear to auscultation  Cardiovascular   Auscultation of heart: Normal rate and rhythm, normal S1 and S2, without murmurs  Examination of extremities for edema and/or varicosities: Normal     Abdomen   Abdomen: Non-tender, no masses  Liver and spleen: No hepatomegaly or splenomegaly  Musculoskeletal Unsteady gait  Ambulates with a cane  Digits and nails: Normal without clubbing or cyanosis  Inspection/palpation of joints, bones, and muscles: Normal     Skin   Skin and subcutaneous tissue: Normal without rashes or lesions  Neurologic BL hand tremors  Psychiatric   Mood and affect: Normal          Future Appointments    Date/Time Provider Specialty Site   02/28/2017 10:00 AM Elicia Hodgkins, M D  [facility]   03/01/2017 03:00 PM EDGAR Kaminski   [facility]   04/05/2017 10:40 AM EDGAR Kuo   Nephrology [address] [facility]     Signatures   Electronically signed by : EDGAR Preciado ; Feb 21 2017  8:34PM EST                       (Author)

## 2018-01-13 NOTE — RESULT NOTES
Discussion/Summary   EGD biopsies were fine, nothing worrisome     Verified Results  (1) TISSUE EXAM 32PZK5274 11:57AM [facility]     Test Name Result Flag Reference   LAB AP CASE REPORT (Report)     Surgical Pathology Report             Case: [id number]                   Authorizing Provider: Reid Johnson DO    Collected:      11/20/2017 1157        Ordering Location:   [address] [place] [address]   [facility]      [facility]2017 [facility]                               Pathologist:      Cullen Rose MD                              Specimens:  A) - Duodenum, normal                                         B) - Stomach, gastritis                                        C) - Esophagus, esoaphgus   LAB AP FINAL DIAGNOSIS (Report)     A  Duodenum:  - Benign duodenal mucosa without specific histopathologic abnormality   - No villous atrophy, no intraepithelial lymphocytosis or crypt   hyperplasia to suggest malabsorptive enteropathy   - No chronic or active duodenitis  - No glandular dysplasia and no evidence of malignancy  B  Gastric biopsy:  - Chronic inactive oxyntic gastritis, negative for curvilinear   Helicobacter pylori, confirmed by immunohistochemical stains, evaluated   with appropriate positive & negative controls  - No atrophy or intestinal metaplasia identified   - No epithelial dysplasia and no evidence of malignancy  C  Esophagus at 40 cm biopsy:  - Benign mixed squamous mucosa & cardia-type columnar mucosa with   nonspecific hyperplastic & chronic inflammatory change  - Eosinophils number fewer than 2 cells per high power field in squamous   mucosa  - No intestinal [goblet cell] metaplasia  - No epithelial dysplasia and no evidence of malignancy      Interpretation performed at [facility], [address] [place] [address]  Electronically signed by Cullen Rose MD on 11/24/2017 at 10:41 AM   LAB AP SURGICAL ADDITIONAL INFORMATION (Report)     All controls performed with the immunohistochemical stains reported above   reacted appropriately  These tests were developed and their performance   characteristics determined by [facility] or   [facility]  They may not be cleared or approved by the [place]  Food and Drug Administration  The FDA has determined that such clearance   or approval is not necessary  These tests are used for clinical purposes  They should not be regarded as investigational or for research  This   laboratory has been approved by [facility], designated as a high-complexity   laboratory and is qualified to perform these tests  LAB AP GROSS DESCRIPTION (Report)     A  The specimen is received in formalin, labeled with the patient's name   and hospital number, and is designated duodenum normal, are multiple   irregularly shaped fragments of tan soft tissue measuring 0 5 x 0 5 x 0 1   cm in aggregate  Entirely submitted  One cassette  B  The specimen is received in formalin, labeled with the patient's name   and hospital number, and is designated Gastritis, are three irregularly   shaped fragments of tan soft tissue measuring 0 5 x 0 4 x 0 1 cm in   aggregate  Entirely submitted  One cassette  C  The specimen is received in formalin, labeled with the patient's name   and hospital number, and is designated Esophagus at 40 cm, is a single   irregularly shaped fragment of tan-white soft tissue measuring 0 3 cm in   greatest dimension  Entirely submitted  One cassette  Note: The estimated total formalin fixation time based upon information   provided by the submitting clinician and the standard processing schedule   is less than 72 hours   Ginny Jackson

## 2018-01-13 NOTE — MISCELLANEOUS
Message   Recorded as Task   Date: 02/15/2016 10:10 AM, Created By: Jasper Client   Task Name: Miscellaneous   Assigned To: Gracie Chavez   Regarding Patient: Wallace Euceda, Status: Active   Comment:    Cheryl Blackmon - 15 Feb 2016 10:10 AM     TASK CREATED  Caller: Self; [phone] (Home); [phone] (Work)  Patient called today with the following weights:  2/8/16 - 208 4  2/9/16 - 209 8  2/10/16 - 210 2  2/11/16 - 211 2/12/16 - 212 2/13/16 - 211 4  2/14/16 - 209 8  kja     Signatures   Electronically signed by : EDGAR Kong ; Feb 15 2016 12:14PM EST                       (Author)

## 2018-01-13 NOTE — RESULT NOTES
Message   Call patient  Urine culture came back positive for E coli infection  Recommend  to complete antibiotic  therapy with Cephalexin as prescribed  Urine culture is resistant to Cipro, Levaquib, Bactrim  Advise to schedule urology evaluation as discussed at the last visit       Verified Results  (1) URINE CULTURE [id number] 05:02PM Shade Bermudez    Order Number: [id number]     Test Name Result Flag Reference   CLINICAL REPORT (Report)     Test:        Urine culture  Specimen Source:  Urine, Unspecified Source  Specimen Type:   Urine  Specimen Date:   7/29/2016 5:02 PM  Result Date:    7/31/2016 12:30 PM  Result Status:   Final result  Resulting Lab:   [facility] [address] [place] [address]            Tel: [phone]      CULTURE                                       ------------------                                   50,000-59,000 cfu/ml Escherichia coli    1651-4023 cfu/ml Mixed Contaminants X2      SUSCEPTIBILITY                                   ------------------                                                       Escherichia coli  METHOD                 SARTHAK  -------------------------------------  --------------------------  AMPICILLIN ($$)             >16 00 ug/ml Resistant  AMPICILLIN + SULBACTAM ($)       >16/8 ug/ml  Resistant  AZTREONAM ($$$)             <=8 ug/ml   Susceptible  CEFAZOLIN ($)              <=8 00 ug/ml Susceptible  CIPROFLOXACIN ($)            >2 00 ug/ml  Resistant  GENTAMICIN ($$)             <=4 ug/ml   Susceptible  LEVOFLOXACIN ($)            >4 00 ug/ml  Resistant  NITROFURANTOIN             64 ug/ml   Intermediate  PIPERACILLIN + TAZOBACTAM ($$$)     <=16 ug/ml  Susceptible  TETRACYCLINE              >8 ug/ml   Resistant  TOBRAMYCIN ($)             <=4 ug/ml   Susceptible  TRIMETHOPRIM + SULFAMETHOXAZOLE ($$$)  >2/38 ug/ml  Resistant

## 2018-01-13 NOTE — MISCELLANEOUS
Message  Patient called today with the following weights:  3/14 - 209 2  3/15 - 209 2  3/16 - 208 2  3/17 - 210 8  3/18 - 210  3/19 - 209 8  3/20 - 210  kja      Active Problems    1  Abnormal EKG (794 31) (R94 31)   2  Acid reflux disease (530 81) (K21 9)   3  Acute on chronic diastolic congestive heart failure (428 33,428 0) (I50 33)   4  Anemia (285 9) (D64 9)   5  Atrial fibrillation (427 31) (I48 91)   6  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)   7  Benign hypertensive CKD (403 10) (I12 9)   8  Bilateral leg edema (782 3) (R60 0)   9  Bruit of left carotid artery (785 9) (R09 89)   10  Cataract (366 9) (H26 9)   11  Chronic diastolic congestive heart failure (428 32,428 0) (I50 32)   12  Chronic kidney disease, stage 3 (585 3) (N18 3)   13  Cocaine abuse in remission (305 63) (F14 10)   14  Cognitive changes (799 59) (R41 89)   15  Constipation (564 00) (K59 00)   16  Diabetic Gastropathy (537 9)   17  Diabetic Nephropathy (583 81)   18  Diabetic polyneuropathy (250 60,357 2) (E11 42)   19  Diabetic retinopathy (250 50,362 01) (E11 319)   20  Diabetic Ulcer Of The Left Foot (250 80)   21  ROD (dyspnea on exertion) (786 09) (R06 09)   22  Drug-induced Essential Tremor (333 1)   23  Encounter for screening mammogram for breast cancer (V76 12) (Z12 31)   24  Esophagitis, reflux (530 11) (K21 0)   25  External Hemorrhoids (455 3)   26  FELIPE (generalized anxiety disorder) (300 02) (F41 1)   27  H/O kidney transplant (V42 0) (Z94 0)   28  Heart palpitations (785 1) (R00 2)   29  Hyperlipidemia (272 4) (E78 5)   30  Hyperplastic colon polyp (211 3) (K63 5)   31  Hypertension (401 9) (I10)   32  Hypothyroidism (244 9) (E03 9)   33  Increased white blood cell count (288 60) (D72 829)   34  Insomnia (780 52) (G47 00)   35  Irritable bowel syndrome (564 1) (K58 9)   36  Major depressive disorder, recurrent episode, in full remission (296 36) (F33 42)   37  Memory loss (780 93) (R41 3)   38   Metabolic acidosis (290 4) (E87 2)   39  MGUS (monoclonal gammopathy of unknown significance) (273 1) (D47 2)   40  Neck pain (723 1) (M54 2)   41  Nonrheumatic aortic valve insufficiency (424 1) (I35 1)   42  Osteopenia (733 90) (M85 80)   43  Osteoporosis (733 00) (M81 0)   44  Peripheral vascular disease (443 9) (I73 9)   45  Post traumatic stress disorder (309 81) (F43 10)   46  Proteinuria (791 0) (R80 9)   47  Rectal polyp (569 0) (K62 1)   48  Restless legs syndrome (333 94) (G25 81)   49  Secondary hyperparathyroidism, renal (588 81) (N25 81)   50  Sinus Tachycardia (427 89)   51  SOB (shortness of breath) (786 05) (R06 02)   52  Tremor (781 0) (R25 1)   53  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 14) (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 90)    Current Meds   1  AmLODIPine Besylate 10 MG Oral Tablet; take 1/2 tab  BID; Therapy: 27EBX7910 to (Evaluate:92Ogi1769)  Requested for: 71Rag2913 Recorded   2  ARIPiprazole 20 MG Oral Tablet; TAKE 1 TABLET AT BEDTIME; Therapy: 78HAP3548 to (Evaluate:55Fnj0557)  Requested for: 88XBR4890; Last   Rx:18Feb2016 Ordered   3  Aspirin 81 MG Oral Tablet; TAKE 1 TABLET DAILY; Therapy: 24IEE5760 to (Evaluate:19Jun2016) Recorded   4  Carafate 1 GM/10ML Oral Suspension; Therapy: (0699 982 13 20) to Recorded   5  Citracal TABS; Take one tablet twice daily Recorded   6  CloNIDine HCl - 0 1 MG Oral Tablet; Take three tablets every morning, two tablets every   noon, and three tablets every evening; Therapy: 90SAV9870 to (Evaluate:38Blf4788)  Requested for: 12XVF3347; Last   Rx:58Dou0708 Ordered   7  DULoxetine HCl - 60 MG Oral Capsule Delayed Release Particles; TAKE 1 CAPSULE   TWICE DAILY; Therapy: 15NCE0460 to (Evaluate:61Dec4634)  Requested for: 32GVD1932; Last   Rx:98Yfv4582 Ordered   8  Folic Acid 1 MG Oral Tablet; TAKE 1 TABLET DAILY AS DIRECTED; Therapy: 54JJU9411 to (Evaluate:12Gah5059)  Requested for: 41YNN4915; Last   Rx:20Sep2015 Ordered   9  Furosemide 40 MG Oral Tablet;  Take one tablet every morning and 1/2 tablet every   evening; Therapy: 99HRV6045 to (26 100188)  Requested for: 50VOR0924 Recorded   10  Generlac 10 GM/15ML SYRP Recorded   11  HydrALAZINE HCl - 50 MG Oral Tablet; TAKE 1 TABLET 3 TIMES DAILY; Therapy: 33RPK2798 to (Evaluate:26Jan2017)  Requested for: 56PJY1435; Last    Rx:09Ruq8934 Ordered   12  Hydrocodone-Acetaminophen 5-325 MG Oral Tablet; TAKE 1 TABLET EVERY 6 HOURS    AS NEEDED FOR PAIN;    Therapy: 17SLB1840 to (Evaluate:07Jan2016); Last Rx:35Qwl3714 Ordered   13  Levothyroxine Sodium 88 MCG Oral Tablet; take 1 tablet every day; Therapy: 53NXD5089 to (Last Rx:02Feb2016)  Requested for: 02Feb2016 Ordered   14  LORazepam 2 MG Oral Tablet; TAKE 1 TABLET TWICE DAILY AS NEEDED; Therapy: 76RDU0131 to (Evaluate:09Aug2016); Last Rx:11Feb2016 Ordered   15  Magnesium  (64 Mg) MG TBCR; TAKE 1 TABLET DAILY; Therapy: 34UOR0657 to (Evaluate:02Dec2013); Last Rx:05Jun2013 Ordered   16  Metoprolol Tartrate 50 MG Oral Tablet; take 1 tablet by mouth twice daily; Therapy: 43UCI7501 to (Evaluate:06Jun2016)  Requested for: 05IZA5512; Last    Rx:08Mar2016 Ordered   17  OneTouch Ultra Blue In [employer]; TEST TWICE A DAY; Therapy: 00RCU8819 to (Evaluate:25Lkm6541)  Requested for: 49GLV9008; Last    Rx:10Mar2016 Ordered   18  OneTouch UltraSoft Lancets Miscellaneous; test twice daily; Therapy: 58FHA2801 to ([facility])  Requested for: 56TNF7152; Last    Rx:09Mar2016 Ordered   19  Pantoprazole Sodium 40 MG Oral Tablet Delayed Release; TAKE 1 TABLET DAILY; Therapy: 33MRW4570 to (Evaluate:02Apr2016)  Requested for: 40ZFI2854; Last    Rx:05Oct2015 Ordered   20  Pravastatin Sodium 80 MG Oral Tablet; take 1 tablet by mouth every day; Therapy: 99VND3518 to (Evaluate:15Nov2016)  Requested for: 12Wjs1654; Last    Rx:19Apr2015 Ordered   21  PredniSONE 5 MG Oral Tablet; Take 1-1/2 tablets daily;     Therapy: 03JBE8561 to (Evaluate:14Mar2016) Requested for: 20Mar2015; Last    Rx:20Mar2015 Ordered   22  ROPINIRole HCl - 0 25 MG Oral Tablet; take 1 tablet by mouth twice daily; Therapy: 17LBZ2320 to (Evaluate:41Qqh8023)  Requested for: 14JBW1281; Last    Rx:16Feb2016 Ordered   23  Sertraline HCl - 100 MG Oral Tablet; TAKE 2 TABLETS DAILY; Therapy: 25MEG7914 to (Evaluate:03Lka9826)  Requested for: 43SDA1866; Last    Rx:11Feb2016 Ordered   24  Sodium Bicarbonate 650 MG Oral Tablet; Take one tablet daily; Therapy: 95CHT1544 to (Evaluate:12Apr2016)  Requested for: 62RHZ6908 Recorded   25  Tacrolimus 1 MG Oral Capsule; TAKE 4 CAPSULES every morning and 3 capsules every    evening; Therapy: 48GSQ2850 to (Evaluate:07Vba9044)  Requested for: 23KBZ3820; Last    Rx:04Mar2016 Ordered   26  TraZODone HCl - 150 MG Oral Tablet; TAKE 1 TABLET AT BEDTIME; Therapy: 41BIW8840 to (Evaluate:76Mid7559)  Requested for: 91TAV2189; Last    Rx:11Feb2016 Ordered   27  Vitamin D3 3000 UNIT Oral Tablet; 1 TAB DAILY; Therapy: 79RCK6452 to (Evaluate:10Feb2016) Recorded    Allergies    1  Penicillins   2  Morphine Derivatives   3   Duricef TABS    Signatures   Electronically signed by : EDGAR Randhawa ; Mar 21 2016  1:37PM EST                       (Author)

## 2018-01-14 VITALS
SYSTOLIC BLOOD PRESSURE: 124 MMHG | HEIGHT: 68 IN | BODY MASS INDEX: 33.19 KG/M2 | WEIGHT: 219 LBS | DIASTOLIC BLOOD PRESSURE: 60 MMHG | HEART RATE: 73 BPM

## 2018-01-14 VITALS
RESPIRATION RATE: 18 BRPM | HEART RATE: 72 BPM | SYSTOLIC BLOOD PRESSURE: 150 MMHG | HEIGHT: 68 IN | DIASTOLIC BLOOD PRESSURE: 62 MMHG | BODY MASS INDEX: 33.19 KG/M2 | WEIGHT: 219 LBS

## 2018-01-14 VITALS
BODY MASS INDEX: 32.3 KG/M2 | WEIGHT: 213.13 LBS | HEIGHT: 68 IN | TEMPERATURE: 98.6 F | RESPIRATION RATE: 20 BRPM | HEART RATE: 72 BPM | SYSTOLIC BLOOD PRESSURE: 130 MMHG | DIASTOLIC BLOOD PRESSURE: 60 MMHG

## 2018-01-14 VITALS
SYSTOLIC BLOOD PRESSURE: 140 MMHG | HEIGHT: 68 IN | HEART RATE: 68 BPM | RESPIRATION RATE: 18 BRPM | WEIGHT: 226.25 LBS | BODY MASS INDEX: 34.29 KG/M2 | DIASTOLIC BLOOD PRESSURE: 58 MMHG

## 2018-01-14 VITALS
BODY MASS INDEX: 33.16 KG/M2 | WEIGHT: 218.8 LBS | DIASTOLIC BLOOD PRESSURE: 64 MMHG | RESPIRATION RATE: 16 BRPM | SYSTOLIC BLOOD PRESSURE: 154 MMHG | HEIGHT: 68 IN | TEMPERATURE: 97.6 F | HEART RATE: 68 BPM

## 2018-01-14 VITALS
SYSTOLIC BLOOD PRESSURE: 160 MMHG | BODY MASS INDEX: 36.02 KG/M2 | HEART RATE: 74 BPM | WEIGHT: 230 LBS | DIASTOLIC BLOOD PRESSURE: 70 MMHG | OXYGEN SATURATION: 96 % | TEMPERATURE: 97.4 F | RESPIRATION RATE: 24 BRPM

## 2018-01-14 VITALS
WEIGHT: 226.38 LBS | SYSTOLIC BLOOD PRESSURE: 152 MMHG | TEMPERATURE: 96.7 F | RESPIRATION RATE: 16 BRPM | DIASTOLIC BLOOD PRESSURE: 56 MMHG | HEART RATE: 64 BPM | HEIGHT: 68 IN | BODY MASS INDEX: 34.31 KG/M2

## 2018-01-14 VITALS
SYSTOLIC BLOOD PRESSURE: 162 MMHG | HEART RATE: 68 BPM | BODY MASS INDEX: 33.95 KG/M2 | HEIGHT: 68 IN | DIASTOLIC BLOOD PRESSURE: 50 MMHG | WEIGHT: 224 LBS

## 2018-01-14 VITALS
SYSTOLIC BLOOD PRESSURE: 164 MMHG | WEIGHT: 226 LBS | BODY MASS INDEX: 34.25 KG/M2 | DIASTOLIC BLOOD PRESSURE: 44 MMHG | HEIGHT: 68 IN | HEART RATE: 63 BPM

## 2018-01-14 VITALS
HEIGHT: 68 IN | WEIGHT: 233.31 LBS | SYSTOLIC BLOOD PRESSURE: 146 MMHG | HEART RATE: 72 BPM | BODY MASS INDEX: 35.36 KG/M2 | DIASTOLIC BLOOD PRESSURE: 58 MMHG

## 2018-01-14 VITALS
WEIGHT: 221 LBS | HEART RATE: 70 BPM | BODY MASS INDEX: 33.49 KG/M2 | HEIGHT: 68 IN | SYSTOLIC BLOOD PRESSURE: 190 MMHG | DIASTOLIC BLOOD PRESSURE: 50 MMHG

## 2018-01-14 NOTE — MISCELLANEOUS
Message     Recorded as Task   Date: 08/14/2017 07:46 AM, Created By: Iliana Sevilla   Task Name: Follow Up   Assigned To: Bridgett Posey   Regarding Patient: Franci Schneider, Status: Active   Comment:    Iliana Sevilla - 14 Aug 2017 7:46 AM     TASK CREATED  Hello    Can patient have referral for Hematology/oncology and appt with their team  i called patient, but no answer, so left voicemail regarding IgG kappa presence on light chains that we need Heme's assistance to see if this is causing proteinuria  Thank you    jessica  Pt aware of the above,scheduling also tasked to call pt and schedule hematology appointment  Active Problems    1  Abnormal EKG (794 31) (R94 31)   2  Acid reflux disease (530 81) (K21 9)   3  Acute kidney injury superimposed on CKD (866 00,585 9) (N17 9,N18 9)   4  Anemia (285 9) (D64 9)   5  Antibiotic prophylaxis for dental procedure indicated due to prior joint replacement   (V58 62) (Z79 2)   6  Atrial fibrillation (427 31) (I48 91)   7  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)   8  Benign hypertensive CKD (403 10) (I12 9)   9  Bilateral carotid bruits (785 9) (R09 89)   10  Bilateral leg edema (782 3) (R60 0)   11  Bruit of left carotid artery (785 9) (R09 89)   12  Cataract (366 9) (H26 9)   13  Chronic constipation (564 00) (K59 09)   14  Chronic diastolic congestive heart failure (428 32,428 0) (I50 32)   15  Chronic kidney disease, stage 4 (severe) (585 4) (N18 4)   16  Cocaine abuse in remission (305 63) (F14 10)   17  Cognitive changes (799 59) (R41 89)   18  Colon cancer screening (V76 51) (Z12 11)   19  Constipation (564 00) (K59 00)   20  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, without long-term    current use of insulin (250 61) (E10 49)   21  Diabetes mellitus with diabetic nephropathy (250 40,583 81) (E11 21)   22  Diabetic Gastropathy (537 9)   23  Diabetic polyneuropathy (250 60,357 2) (E11 42)   24   Diabetic retinopathy (250 50,362 01) (E11 319)   25  Diabetic Ulcer Of The Left Foot (250 80)   26  Diastolic dysfunction (059 7) (I51 9)   27  ROD (dyspnea on exertion) (786 09) (R06 09)   28  Drug-induced Essential Tremor (333 1)   29  Encounter for screening mammogram for breast cancer (V76 12) (Z12 31)   30  Esophagitis, reflux (530 11) (K21 0)   31  External Hemorrhoids (455 3)   32  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)   33  Fatigue (780 79) (R53 83)   34  FELIPE (generalized anxiety disorder) (300 02) (F41 1)   35  H/O kidney transplant (V42 0) (Z94 0)   36  Heart palpitations (785 1) (R00 2)   37  Hospital discharge follow-up (V67 59) (Z09)   38  Hyperlipidemia (272 4) (E78 5)   39  Hyperplastic colon polyp (211 3) (K63 5)   40  Hypertension (401 9) (I10)   41  Hyponatremia (276 1) (E87 1)   42  Hypothyroidism (244 9) (E03 9)   43  Increased frequency of urination (788 41) (R35 0)   44  Increased white blood cell count (288 60) (D72 829)   45  Insomnia (780 52) (G47 00)   46  Irritable bowel syndrome (564 1) (K58 9)   47  Major depressive disorder, recurrent episode, in full remission (296 36) (F33 42)   48  Memory loss (780 93) (R41 3)   49  Memory loss or impairment (780 93) (R41 3)   50  Metabolic acidosis (156 9) (E87 2)   51  MGUS (monoclonal gammopathy of unknown significance) (273 1) (D47 2)   52  Need for influenza vaccination (V04 81) (Z23)   53  Nonrheumatic aortic valve insufficiency (424 1) (I35 1)   54  OAB (overactive bladder) (596 51) (N32 81)   55  Osteopenia (733 90) (M85 80)   56  Osteoporosis (733 00) (M81 0)   57  Other calculus in bladder (594 1) (N21 0)   58  Peripheral vascular disease (443 9) (I73 9)   59  Post traumatic stress disorder (309 81) (F43 10)   60  Preop general physical exam (V72 83) (Z01 818)   61  Preoperative cardiovascular examination (V72 81) (Z01 810)   62  Proteinuria (791 0) (R80 9)   63  Pyelonephritis (590 80) (N12)   64  Rectal polyp (569 0) (K62 1)   65   Recurrent UTI (599 0) (N39 0) 66  Restless legs syndrome (333 94) (G25 81)   67  Secondary hyperparathyroidism, renal (588 81) (N25 81)   68  Snoring (786 09) (R06 83)   69  SOB (shortness of breath) (786 05) (R06 02)   70  Status post amputation of right great toe (V49 71) (Z89 411)   71  Status post pancreas transplantation (V42 83) (Z94 83)   72  Status post transmetatarsal amputation of left foot (V49 73) (Z89 432)   73  Tremor (781 0) (R25 1)   74  Unsteady gait (781 2) (R26 81)   75  Weakness (780 79) (R53 1)    Current Meds   1  Acetaminophen 325 MG Oral Tablet; TAKE 1 TO 2 TABLETS EVERY 6 HOURS AS   NEEDED Recorded   2  AmLODIPine Besylate 10 MG Oral Tablet; TAKE 1 TABLET BY MOUTH EVERY   MORNING AND 1/2 TABLET BY MOUTH EVERY EVENING; Therapy: 13AED5287 to (Laura Console)  Requested for: 11Aug2017; Last   Rx:11Aug2017 Ordered   3  ARIPiprazole 20 MG Oral Tablet; Therapy: 58HUW6330 to  Requested for: 08Aug2017 Recorded   4  Aspirin 81 MG TABS; TAKE 1 TABLET DAILY; Therapy: 18UYT9513 to (Evaluate:19Jun2016) Recorded   5  BD Pen Needle Mary U/F 32G X 4 MM Miscellaneous; Use once a day; Therapy: 82WTT8297 to (Evaluate:65Mhd8246)  Requested for: 74QGE7466; Last   Rx:11Aug2017 Ordered   6  Catapres 0 1 MG Oral Tablet (CloNIDine HCl); take 3 tab  in am, 2 tab at noon, 3 tab  at   night; Therapy: 99TGJ4050 to (Last Rx:82Xub4984)  Requested for: 06Ekp6049 Ordered   7  Clindamycin HCl - 300 MG Oral Capsule; take 2 caps  1 hr  prior to dental procedure; Therapy: 15WCT2603 to (Last Rx:30Qmx3562)  Requested for: 56Eee2717 Ordered   8  Doxazosin Mesylate 1 MG Oral Tablet; TAKE 1 TABLET Bedtime  Requested for:   87OMQ9628; Last Rx:99Ivg0355 Ordered   9  DULoxetine HCl - 60 MG Oral Capsule Delayed Release Particles (Cymbalta); TAKE 1   CAPSULE TWICE DAILY; Therapy: 52GZH4828 to (Evaluate:95Fcb4963)  Requested for: 97FTM3644; Last   Rx:02Mar2017 Ordered   10  Folic Acid 1 MG Oral Tablet; TAKE 1 TABLET DAILY AS DIRECTED;     Therapy: 31BJZ3310 to (Evaluate:07Oct2017)  Requested for: 55CSE7215; Last    Rx:88Dpt2508 Ordered   11  Furosemide 20 MG Oral Tablet; TAKE 1 TABLET DAILY; Therapy: 63Rtx2562 to (Evaluate:59Ilk6132) Recorded   12  HydrALAZINE HCl - 50 MG Oral Tablet; TAKE 1 TABLET 3 TIMES DAILY; Therapy: 46UYU4451 to (Bethanie Simons)  Requested for: 57QLV9307; Last    Rx:13Mar2017 Ordered   13  Lactulose 10 GM/15ML Oral Solution; TAKE 30 ML DAILY; Therapy: 17LTQ3215 to (Last Rx:02Jun2017)  Requested for: 02Jun2017 Ordered   14  Levothyroxine Sodium 88 MCG Oral Tablet; take 1 tablet by mouth daily; Therapy: 33XEF6071 to (Evaluate:71Qzb7019)  Requested for: 81Lgr6857 Recorded   15  LORazepam 2 MG Oral Tablet; Take 1 tablet 2 times daily as needed; Therapy: 43TNS9700 to (Evaluate:41Pbv3864)  Requested for: 44Dyd9210 Recorded   16  Metoprolol Tartrate 50 MG Oral Tablet; take 1 tablet by mouth twice daily; Therapy: 86CYB7373 to (Evaluate:71Yir8904)  Requested for: 27Jun2017; Last    Rx:27Jun2017 Ordered   17  OneTouch Ultra Blue In [employer]; test twice daily; Therapy: 51VOQ3058 to (Evaluate:90Drj5248)  Requested for: 29JGS0572; Last    Rx:11Aug2017 Ordered   18  OneTouch UltraSoft Lancets Miscellaneous; test twice daily; Therapy: 86JAM8448 to (Evaluate:75Cnd1099)  Requested for: 57Zgy7659 Recorded   19  Pravastatin Sodium 80 MG Oral Tablet; take 1 tablet by mouth every day; Therapy: 55SVY3573 to (Evaluate:14Ljw5043)  Requested for: 77MDZ7842; Last    Rx:22Jan2017 Ordered   20  PredniSONE 5 MG Oral Tablet; TAKE 1 1/2 TABLETS BY MOUTH EVERY DAY; Therapy: 67ZXX9920 to (Evaluate:34Aci8845)  Requested for: 17XSK5203; Last    Rx:06Jun2017 Ordered   21  ROPINIRole HCl - 0 25 MG Oral Tablet; take 1 tablet by mouth twice daily; Therapy: 64YYI7441 to (Dashawn Snow)  Requested for: 78Idv5825; Last    Rx:93Yxs3400 Ordered   22  Sertraline HCl - 100 MG Oral Tablet; TAKE 2 TABLET BY MOUTH DAILY;     Therapy: 89ZEE5567 to (77 873 135)  Requested for: 76XLA0590; Last    Rx:39Gdn2621 Ordered   23  Sodium Bicarbonate 650 MG Oral Tablet; Take one tablet daily; Therapy: 46ICA1321 to (Evaluate:12Apr2016)  Requested for: 16GJU9561 Recorded   24  Tacrolimus 1 MG Oral Capsule (Prograf); Take 4 in the morning and 3 in the evening     Requested for: 95Tun1377; Last Rx:82Oei2408 Ordered   25  Toujeo SoloStar 300 UNIT/ML Subcutaneous Solution Pen-injector; INJECT 25 UNIT    Bedtime; Therapy: 52OYL2501 to (Evaluate:62Viw9684)  Requested for: 26FEC4962; Last    Rx:83Bli4729 Ordered   26  TraZODone HCl - 150 MG Oral Tablet; TAKE 1 TABLET AT BEDTIME; Therapy: 94ZBX7968 to ([facility])  Requested for: 23Gee3740; Last    Rx:29Fbz4353 Ordered   27  Vitamin D3 1000 UNIT Oral Capsule; TAKE 3 PILLS AT NOON BY MOUTH; Therapy: (Recorded:17Jun2016) to Recorded    Allergies    1  Penicillins   2  Morphine Derivatives   3  Duricef TABS   4   Myrbetriq TB24    Signatures   Electronically signed by : Jonelle Mccord, ; Aug 15 2017  5:20PM EST                       (Author)

## 2018-01-14 NOTE — MISCELLANEOUS
Message   Recorded as Task   Date: 04/26/2016 12:27 PM, Created By: Andriy Angel   Task Name: Miscellaneous   Assigned To: Cheryl Blackmon   Regarding Patient: Ksenia Sue, Status: Active   Comment:    Sundeep Boyle - 26 Apr 2016 12:27 PM     TASK CREATED  re: labs from today:  1) Need Tac level  2) Hold lasix for 3 doses  3) Please update med list and task me when done so I can review  4) Hunter't w D or V this week for f/u of EDUARDO and increased creat  5) hold 2 doses of pravastatin  6) CMP/ po4 and Tac level this Thurs   Cheryl Blackmon - 26 Apr 2016 3:54 PM     TASK REASSIGNED: Previously Assigned To NEPHROLOGY ASSTATIANA ZARAGOZA,Team   I spoke with the patient and and reviewed the above with her  She will go on Thursday to [place] [address] for her blood work  She is scheduled for an appt with Chandu Tucker on 4/29/16  kj      Active Problems    1  Abnormal EKG (794 31) (R94 31)   2  Acid reflux disease (530 81) (K21 9)   3  Acute on chronic diastolic congestive heart failure (428 33,428 0) (I50 33)   4  Anemia (285 9) (D64 9)   5  Atrial fibrillation (427 31) (I48 91)   6  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)   7  Benign hypertensive CKD (403 10) (I12 9)   8  Bilateral leg edema (782 3) (R60 0)   9  Bruit of left carotid artery (785 9) (R09 89)   10  Cataract (366 9) (H26 9)   11  Chronic diastolic congestive heart failure (428 32,428 0) (I50 32)   12  Chronic kidney disease, stage 3 (585 3) (N18 3)   13  Cocaine abuse in remission (305 63) (F14 10)   14  Cognitive changes (799 59) (R41 89)   15  Constipation (564 00) (K59 00)   16  Diabetic Gastropathy (537 9)   17  Diabetic Nephropathy (583 81)   18  Diabetic polyneuropathy (250 60,357 2) (E11 42)   19  Diabetic retinopathy (250 50,362 01) (E11 319)   20  Diabetic Ulcer Of The Left Foot (250 80)   21  ROD (dyspnea on exertion) (786 09) (R06 09)   22  Drug-induced Essential Tremor (333 1)   23  Dysuria (788 1) (R30 0)   24   Encounter for screening mammogram for breast cancer (V76 12) (Z12 31)   25  Esophagitis, reflux (530 11) (K21 0)   26  External Hemorrhoids (455 3)   27  FELIPE (generalized anxiety disorder) (300 02) (F41 1)   28  H/O kidney transplant (V42 0) (Z94 0)   29  Heart palpitations (785 1) (R00 2)   30  Hyperlipidemia (272 4) (E78 5)   31  Hyperplastic colon polyp (211 3) (K63 5)   32  Hypertension (401 9) (I10)   33  Hypothyroidism (244 9) (E03 9)   34  Increased white blood cell count (288 60) (D72 829)   35  Insomnia (780 52) (G47 00)   36  Irritable bowel syndrome (564 1) (K58 9)   37  Major depressive disorder, recurrent episode, in full remission (296 36) (F33 42)   38  Memory loss (780 93) (R41 3)   39  Metabolic acidosis (528 3) (E87 2)   40  MGUS (monoclonal gammopathy of unknown significance) (273 1) (D47 2)   41  Neck pain (723 1) (M54 2)   42  Nonrheumatic aortic valve insufficiency (424 1) (I35 1)   43  Osteopenia (733 90) (M85 80)   44  Osteoporosis (733 00) (M81 0)   45  Peripheral vascular disease (443 9) (I73 9)   46  Post traumatic stress disorder (309 81) (F43 10)   47  Proteinuria (791 0) (R80 9)   48  Rectal polyp (569 0) (K62 1)   49  Restless legs syndrome (333 94) (G25 81)   50  Secondary hyperparathyroidism, renal (588 81) (N25 81)   51  Sinus Tachycardia (427 89)   52  SOB (shortness of breath) (786 05) (R06 02)   53  Tremor (781 0) (R25 1)   54  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 (645 65) (C94 39)   55  Urinary tract infection (599 0) (N39 0)   56  UTI (urinary tract infection), bacterial (599 0,041 9) (N39 0,A49 9)   57  Vaginal itching (698 1) (L29 8)    Current Meds   1  AmLODIPine Besylate 10 MG Oral Tablet; take 1/2 tab  BID; Therapy: 69DZR0093 to (Evaluate:48Gka5983)  Requested for: 12Apr2016; Last   Rx:23Mar2016 Ordered   2  ARIPiprazole 20 MG Oral Tablet; TAKE 1 TABLET AT BEDTIME; Therapy: 75ZNJ2442 to (Evaluate:11Whq8203)  Requested for: 12Apr2016; Last   Rx:18Feb2016 Ordered   3   Aspirin 81 MG TABS; TAKE 1 TABLET DAILY; Therapy: 92NYL1897 to (Evaluate:19Jun2016) Recorded   4  Cephalexin 500 MG Oral Capsule; TAKE 1 CAPSULE 3 TIMES DAILY; Therapy: 89Ajc0975 to (Complete:02May2016)  Requested for: 25Apr2016; Last   Rx:25Apr2016 Ordered   5  Citracal TABS; Take one tablet twice daily Recorded   6  CloNIDine HCl - 0 1 MG Oral Tablet; Take three tablets every morning, two tablets every   noon, and three tablets every evening; Therapy: 62KFI3930 to (Evaluate:10Feb2017)  Requested for: 12Apr2016; Last   Rx:16Feb2016 Ordered   7  Doxazosin Mesylate 1 MG Oral Tablet (Cardura); TAKE 1 TABLET Bedtime; Therapy: 91RYL2585 to (Evaluate:16Mar2017)  Requested for: 12Apr2016; Last   Rx:21Mar2016 Ordered   8  DULoxetine HCl - 60 MG Oral Capsule Delayed Release Particles (Cymbalta); TAKE 1   CAPSULE TWICE DAILY; Therapy: 91CUW3314 to (Evaluate:09Aug2016)  Requested for: 12Apr2016; Last   Rx:11Feb2016 Ordered   9  Folic Acid 1 MG Oral Tablet; TAKE 1 TABLET DAILY AS DIRECTED; Therapy: 92KVI9962 to (Evaluate:14Sep2016)  Requested for: 12Apr2016; Last   Rx:01Bkx5574 Ordered   10  Furosemide 40 MG Oral Tablet; Take one tablet every morning and 1/2 tablet every    evening; Therapy: 05GYC8192 to (Katiuska Houston)  Requested for: 12Apr2016 Recorded   11  Generlac 10 GM/15ML SYRP Recorded   12  HydrALAZINE HCl - 50 MG Oral Tablet; TAKE 1 TABLET 3 TIMES DAILY; Therapy: 47QLJ7222 to (Evaluate:26Jan2017)  Requested for: 12Apr2016; Last    Rx:01Feb2016 Ordered   13  Levothyroxine Sodium 88 MCG Oral Tablet (Synthroid); take 1 tablet every day; Therapy: 44HXS6378 to (Last Rx:02Feb2016)  Requested for: 12Apr2016 Ordered   14  LORazepam 2 MG Oral Tablet; TAKE 1 TABLET TWICE DAILY AS NEEDED; Therapy: 02ODI5063 to (Evaluate:30Vmx3848); Last Rx:11Feb2016 Ordered   15  Magnesium  (64 Mg) MG TBCR; TAKE 1 TABLET DAILY; Therapy: 54OEF0149 to (Evaluate:02Dec2013); Last Rx:05Jun2013 Ordered   16   Metoprolol Tartrate 50 MG Oral Tablet; take 1 tablet by mouth twice daily; Therapy: 49QJT6746 to (Evaluate:06Jun2016)  Requested for: 12Apr2016; Last    Rx:08Mar2016 Ordered   17  OneTouch Ultra Blue In [employer]; TEST TWICE A DAY; Therapy: 78FCH3479 to (Evaluate:74Grf1821)  Requested for: 12Apr2016; Last    Rx:10Mar2016 Ordered   18  OneTouch UltraSoft Lancets Miscellaneous; test twice daily; Therapy: 53ZWS1908 to ([phone])  Requested for: 12Apr2016; Last    Rx:09Mar2016 Ordered   19  Pantoprazole Sodium 40 MG Oral Tablet Delayed Release; TAKE 1 TABLET DAILY; Therapy: 32FIL4139 to (Evaluate:02Apr2016)  Requested for: 12Apr2016; Last    Rx:05Oct2015 Ordered   20  Pravastatin Sodium 80 MG Oral Tablet; take 1 tablet by mouth every day; Therapy: 06QYY4233 to (Evaluate:33Rhr2620)  Requested for: 12Apr2016; Last    Rx:19Apr2015 Ordered   21  PredniSONE 5 MG Oral Tablet; Take 1-1/2 tablets daily; Therapy: 64NCW9522 to (Evaluate:46Brq6409)  Requested for: 12Apr2016; Last    Rx:20Mar2015 Ordered   22  ROPINIRole HCl - 0 25 MG Oral Tablet; take 1 tablet by mouth twice daily; Therapy: 15Dtn6337 to (Evaluate:28Lqb0073)  Requested for: 12Apr2016; Last    Rx:22Mar2016 Ordered   23  Sertraline HCl - 100 MG Oral Tablet; TAKE 2 TABLETS DAILY; Therapy: 44IJF4558 to (Evaluate:64Kov0319)  Requested for: 12Apr2016; Last    Rx:34Adb6965 Ordered   24  Sodium Bicarbonate 650 MG Oral Tablet; Take one tablet daily; Therapy: 38ILD0244 to (Evaluate:12Apr2016)  Requested for: 12Apr2016 Recorded   25  Tacrolimus 1 MG Oral Capsule; TAKE 4 CAPSULES every morning and 3 capsules every    evening; Therapy: 72UWV9597 to (Evaluate:21Ysc5106)  Requested for: 12Apr2016; Last    Rx:04Mar2016 Ordered   26  TraZODone HCl - 150 MG Oral Tablet; TAKE 1 TABLET AT BEDTIME; Therapy: 23VAT1123 to (Evaluate:67Rda3571)  Requested for: 12Apr2016; Last    Rx:[id number] Ordered   27  Vitamin D3 3000 UNIT Oral Tablet; 1 TAB DAILY;     Therapy: 26LUV5019 to (Evaluate:03Kzc0754) Recorded    Allergies    1  Penicillins   2  Morphine Derivatives   3   [employer]    Signatures   Electronically signed by : EDGAR Randhawa ; Apr 27 2016 10:33AM EST
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
## 2018-01-14 NOTE — RESULT NOTES
Verified Results  (1) URINALYSIS w URINE C/S REFLEX (will reflex a microscopy if leukocytes, occult blood, or nitrites are not within normal limits) 25Apr2016 07:05AM Maxine Marrufo     Test Name Result Flag Reference   COLOR Yellow     CLARITY Cloudy     PH UA 6 5  4 5-8 0   LEUKOCYTE ESTERASE UA Large A Negative   NITRITE UA Negative  Negative   PROTEIN  (2+) mg/dl A Negative   GLUCOSE UA Negative mg/dl  Negative   KETONES UA Negative mg/dl  Negative   UROBILINOGEN UA 0 2 E U /dl  0 2, 1 0 E U /dl   BILIRUBIN UA Negative  Negative   BLOOD UA Negative  Negative   SPECIFIC GRAVITY UA 1 016  1 003-1 030     (1) URINALYSIS w URINE C/S REFLEX (will reflex a microscopy if leukocytes, occult blood, or nitrites are not within normal limits) 25Apr2016 07:05AM Maxine Marrufo     Test Name Result Flag Reference   BACTERIA Occasional /hpf  None Seen, Occasional   EPITHELIAL CELLS Occasional /hpf  None Seen, Occasional   FINE GRANULAR CASTS, UA 3-5 /lpf     RBC UA None Seen /hpf  None Seen   WBC UA 10-20 /hpf A None Seen   MUCOUS THREADS Occasional  Occasional, Moderate, Innumerable

## 2018-01-14 NOTE — PROGRESS NOTES
Patient Health Assessment    Date:            04/06/2017  Blood Pressure:  170/70  Pulse:           72  Age:             52  Weight:          222 lbs  Height/Length:   5' 7"  Body Mass Index: 34 7  Provider:        [email or web address]  Clinic:          [facility]: Diabetes    Heart Condition    Heart Murmur    High Blood Pressure    Kidney Disease    Latex Allergy    *Pre-Med    Organ Transplant    anemia    Psychiatric problems  Medications: LACTULOSE 10 GM/15 ML SOLUTION    Trazodone HCl    Clonidine    Sodium bicarbonate    SERTRALINE  MG TABLET    Lorazepam    ASPIR EC 81 MG TABLET 81 MG    Lasix 40 MG Oral Tablet    OTHER  Allergies:      Penicillin    Latex    Morphine  Since Last Visit: Medical Alert: No Change    Medications: No Change    Allergies:        No Change  Pain Scale Type: Numeric Pain ScalePain Level: 0  Description:  Pt presents for restorative #4  PMH review, no changes  Pt took pre-med of  600 mg Clindamycin prior to appointment  Applied topical benzocaine,  administered 1 5 carps 3% carbocaine  Prep with 330 carbide on high speed  Caries removed removed with round carbide on slow speed  Isolation with  cotton rolls and dri-angles  Etch with 37% H2PO4, rinse, dry  Applied Vividbond   with 15 second scrubx2, gentle air dry and light cured  Restored with  Beautifil flowable and Alpha II composite and light cured  Refined with  finishing burs, polished with enhance point  Verified occlusion and contacts  Pt satisfied and left ambulatory      NV: kylah KINGSLEY/LIDIA    ----- Signed on Thursday, April 06, 2017 at 2:04:44 PM  -----  ----- Provider: [email or web address] - [facility]ist -- Clinic: [facility] -----

## 2018-01-15 VITALS
WEIGHT: 213.13 LBS | DIASTOLIC BLOOD PRESSURE: 80 MMHG | BODY MASS INDEX: 32.3 KG/M2 | HEIGHT: 68 IN | SYSTOLIC BLOOD PRESSURE: 155 MMHG

## 2018-01-15 VITALS
HEART RATE: 80 BPM | TEMPERATURE: 97.3 F | DIASTOLIC BLOOD PRESSURE: 64 MMHG | WEIGHT: 229.8 LBS | BODY MASS INDEX: 34.83 KG/M2 | HEIGHT: 68 IN | SYSTOLIC BLOOD PRESSURE: 144 MMHG | RESPIRATION RATE: 20 BRPM

## 2018-01-15 VITALS
HEIGHT: 68 IN | DIASTOLIC BLOOD PRESSURE: 60 MMHG | HEART RATE: 80 BPM | TEMPERATURE: 97.9 F | SYSTOLIC BLOOD PRESSURE: 160 MMHG | RESPIRATION RATE: 16 BRPM | WEIGHT: 220 LBS | BODY MASS INDEX: 33.34 KG/M2

## 2018-01-15 NOTE — PROGRESS NOTES
Medical Alert: Diabetes    Heart Condition    Heart Murmur    High Blood Pressure    Kidney Disease    Latex Allergy    *Pre-Med    Organ Transplant    anemia    Psychiatric problems  Medications: LACTULOSE 10 GM/15 ML SOLUTION    Trazodone HCl    Clonidine    Sodium bicarbonate    SERTRALINE  MG TABLET    Lorazepam    ASPIR EC 81 MG TABLET 81 MG    Lasix    OTHER  Allergies:      Penicillin    Latex    Morphine  Since Last Visit: Medical Alert: Change    Medications: Change    Allergies:        Change  Pain Scale Type: Numeric Pain ScalePain Level: 0  Description:    Patient Health Assessment  Too CLyndam  Ab  prophy  Pt did not stop aspirin as Dr Alexys Houser  but discussed  with Dr Micki Curran who said it's ok to proceed  Date:            12/02/2016  Blood Pressure:  174/62  Pulse:           61  Age:             51  Weight:          214 lbs  Height/Length:   5' 7"  Body Mass Index: 33 5  Provider:        [email or web address]  Clinic:          [facility]  tough to get BP reading on-arm cuff could not get BP  Wrist cuff gave rd  SRP :UR  Topical anesthesia applied  Administered   1   Carps  1 7    mL   Infiltrated neg  aspirat  to site:UR-3/4 carp   lt  bldg  Pt  tolerated well  Little sens  on linguals  Hand & Ultrasonic SRP  Irrigated post trt  subging  with:Peridex  No Insurance    NV:Maryellen-as per   notes, only needed SRP UR   COntinue with gen  pro     ----- Signed on Friday, December 02, 2016 at 10:31:54 AM  -----  ----- Provider: [email or web address] - [facility] -- Clinic: [facility] -----

## 2018-01-15 NOTE — MISCELLANEOUS
Message   Recorded as Task   Date: 02/22/2016 12:46 PM, Created By: Panfilo Benites   Task Name: Miscellaneous   Assigned To: Panfilo Benites   Regarding Patient: Olivia Casas, Status: Active   [facility] 22 Feb 2016 12:46 PM     TASK CREATED  Please ask Ria Gaxiola if she has any SOB and how her edema looks to her? Cheryl Blackmon - 22 Feb 2016 2:07 PM     TASK REPLIED TO: Previously Assigned To NEPHROLOGY ASSTATIANA ZARAGOZA,Team  I spoke with the patient  She states that she does not have any edema, but she does have SOB which is worse than normal   She does, however, think that this may due to the fact that she has a cold and she states that her sinuses are "blocked up"    She is calling her PCP to make an appt regarding this   [facility]     Signatures   Electronically signed by : EDGAR Castaneda ; Feb 22 2016  4:42PM EST                       (Author)

## 2018-01-15 NOTE — RESULT NOTES
Verified Results  (1) HEMOGLOBIN A1C 05Oct2017 07:37AM Valeria Castro Order Number: [id number]     Test Name Result Flag Reference   HEMOGLOBIN A1C 5 3 %  4 2-6 3   EST  AVG  GLUCOSE 105 mg/dl       (1) TSH 05Oct2017 07:37AM Valeria Castro Order Number: [id number]     Test Name Result Flag Reference   TSH 3 380 uIU/mL  0 358-3 740   Patients undergoing fluorescein dye angiography may retain small amounts of fluorescein in the body for 48-72 hours post procedure  Samples containing fluorescein can produce falsely depressed TSH values  If the patient had this procedure,a specimen should be resubmitted post fluorescein clearance  The recommended reference ranges for TSH during pregnancy are as follows:  First trimester 0 1 to 2 5 uIU/mL  Second trimester  0 2 to 3 0 uIU/mL  Third trimester 0 3 to 3 0 uIU/m     (1) T4, FREE 05Oct2017 07:37AM Valeria Castro Order Number: [id number]     Test Name Result Flag Reference   T4,FREE 0 97 ng/dL  0 76-1 46   Specimen collection should occur prior to Sulfasalazine administration due to the potential for falsely elevated results

## 2018-01-15 NOTE — RESULT NOTES
Message   Call patient  TSH level has improved, now is in normal range  Recommend to continue Synthroid 88 mcg daily  Recheck TSH in 3 months  Mail scrit for BW to patient  Verified Results  (1) TSH 40NNY5665 06:39AM Kate Cohn    Order Number: [id number]    Patients undergoing fluorescein dye angiography may retain small amounts of fluorescein in the body for 48-72 hours post procedure  Samples containing fluorescein can produce falsely depressed TSH values  If the patient had this procedure,a specimen should be resubmitted post fluorescein clearance  The recommended reference ranges for TSH during pregnancy are as follows:  First trimester 0 1 to 2 5 uIU/mL  Second trimester  0 2 to 3 0 uIU/mL  Third trimester 0 3 to 3 0 uIU/m     Test Name Result Flag Reference   TSH 2 960 uIU/mL  0 358-3 740       Plan  Hypothyroidism    · (1) TSH; Status:Active;  Requested LOGAN:79VHV4008;

## 2018-01-15 NOTE — MISCELLANEOUS
Message   Recorded as Task   Date: 08/30/2017 10:25 PM, Created By: Randolph Mcwilliams   Task Name: Follow Up   Assigned To: Lane Gould   Regarding Patient: Maximilian Ferreira, Status: In Progress   Comment:    Humza Gusman - 30 Aug 2017 10:25 PM     TASK CREATED  Hello    can patient's urine have culture run on it?    can patient be notified that that we are awaiting urine culture to return; also could you see if patient is having any urinary symptoms  Thank you    Ana Maria Serrano - 31 Aug 2017 10:53 AM     TASK IN PROGRESS   Culture did come back positive with E-Coli  Per your request pt has been started on Keflex 500 mg BD x 7 days with repeat blood work and urine dated for 9/22  [facility] 8/31/2017    thank you ~ np    - also referral to Urology for bladder diverticulum and recurrent UTI 1        1 Amended By: Randolph Mcwilliams; Aug 31 2017 4:58 PM EST    Active Problems   1  Abnormal EKG (794 31) (R94 31)  2  Acid reflux disease (530 81) (K21 9)  3  Acute kidney injury superimposed on CKD (866 00,585 9) (N17 9,N18 9)  4  Acute UTI (599 0) (N39 0)  5  Anemia (285 9) (D64 9)  6  Antibiotic prophylaxis for dental procedure indicated due to prior joint replacement   (V58 62) (Z79 2)  7  Atrial fibrillation (427 31) (I48 91)  8  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)  9  Benign hypertensive CKD (403 10) (I12 9)  10  Bilateral carotid bruits (785 9) (R09 89)  11  Bilateral leg edema (782 3) (R60 0)  12  Bruit of left carotid artery (785 9) (R09 89)  13  Cataract (366 9) (H26 9)  14  Chronic constipation (564 00) (K59 09)  15  Chronic diastolic congestive heart failure (428 32,428 0) (I50 32)  16  Chronic kidney disease, stage 4 (severe) (585 4) (N18 4)  17  Cocaine abuse in remission (305 63) (F14 10)  18  Cognitive changes (799 59) (R41 89)  19  Colon cancer screening (V76 51) (Z12 11)  20  Constipation (564 00) (K59 00)  21   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, without long-term    current use of insulin (250 61) (E10 49)  22  Diabetes mellitus with diabetic nephropathy (250 40,583 81) (E11 21)  23  Diabetic Gastropathy (537 9)  24  Diabetic polyneuropathy (250 60,357 2) (E11 42)  25  Diabetic retinopathy (250 50,362 01) (E11 319)  26  Diabetic Ulcer Of The Left Foot (250 80)  27  Diastolic dysfunction (073 9) (I51 9)  28  ROD (dyspnea on exertion) (786 09) (R06 09)  29  Drug-induced Essential Tremor (333 1)  30  Encounter for screening mammogram for breast cancer (V76 12) (Z12 31)  31  Esophagitis, reflux (530 11) (K21 0)  32  External Hemorrhoids (455 3)  33  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)  34  Fatigue (780 79) (R53 83)  35  FELIPE (generalized anxiety disorder) (300 02) (F41 1)  36  H/O kidney transplant (V42 0) (Z94 0)  37  Heart palpitations (785 1) (R00 2)  38  Hospital discharge follow-up (V67 59) (Z09)  39  Hyperlipidemia (272 4) (E78 5)  40  Hyperplastic colon polyp (211 3) (K63 5)  41  Hypertension (401 9) (I10)  42  Hyponatremia (276 1) (E87 1)  43  Hypothyroidism (244 9) (E03 9)  44  Increased frequency of urination (788 41) (R35 0)  45  Increased white blood cell count (288 60) (D72 829)  46  Insomnia (780 52) (G47 00)  47  Irritable bowel syndrome (564 1) (K58 9)  48  Major depressive disorder, recurrent episode, in full remission (296 36) (F33 42)  49  Memory loss (780 93) (R41 3)  50  Memory loss or impairment (780 93) (R41 3)  51  Metabolic acidosis (689 6) (E87 2)  52  MGUS (monoclonal gammopathy of unknown significance) (273 1) (D47 2)  53  Need for influenza vaccination (V04 81) (Z23)  54  Nonrheumatic aortic valve insufficiency (424 1) (I35 1)  55  OAB (overactive bladder) (596 51) (N32 81)  56  Osteopenia (733 90) (M85 80)  57  Osteoporosis (733 00) (M81 0)  58  Other calculus in bladder (594 1) (N21 0)  59  Peripheral vascular disease (443 9) (I73 9)  60  Post traumatic stress disorder (309 81) (F43 10)  61   Preop general physical exam (V72 83) (Z01 818)  62  Preoperative cardiovascular examination (V72 81) (Z01 810)  63  Proteinuria (791 0) (R80 9)  64  Pyelonephritis (590 80) (N12)  65  Rectal polyp (569 0) (K62 1)  66  Recurrent UTI (599 0) (N39 0)  67  Restless legs syndrome (333 94) (G25 81)  68  Secondary hyperparathyroidism, renal (588 81) (N25 81)  69  Snoring (786 09) (R06 83)  70  SOB (shortness of breath) (786 05) (R06 02)  71  Status post amputation of right great toe (V49 71) (Z89 411)  72  Status post pancreas transplantation (V42 83) (Z94 83)  73  Status post transmetatarsal amputation of left foot (V49 73) (Z89 432)  74  Tremor (781 0) (R25 1)  75  Unsteady gait (781 2) (R26 81)  76  Weakness (780 79) (R53 1)    Current Meds  1  Acetaminophen 325 MG Oral Tablet; TAKE 1 TO 2 TABLETS EVERY 6 HOURS AS   NEEDED Recorded  2  AmLODIPine Besylate 10 MG Oral Tablet; TAKE 1 TABLET BY MOUTH EVERY   MORNING AND 1/2 TABLET BY MOUTH EVERY EVENING; Therapy: 91ANE5477 to (678 0217)  Requested for: 11Aug2017; Last   Rx:11Aug2017 Ordered  3  ARIPiprazole 20 MG Oral Tablet; Therapy: 76AUI4519 to  Requested for: 08Aug2017 Recorded  4  Aspirin 81 MG TABS; TAKE 1 TABLET DAILY; Therapy: 97LZD5251 to (Evaluate:19Jun2016) Recorded  5  BD Pen Needle Mary U/F 32G X 4 MM Miscellaneous; Use 4x daily; Therapy: 36PHR2276 to (Evaluate:11Aug2018)  Requested for: 90KAA8964; Last   Rx:05Gkb1866 Ordered  6  Catapres 0 1 MG Oral Tablet; take 3 tab  in am, 2 tab at noon, 3 tab  at night; Therapy: 22LJC1086 to (Last Rx:10Iaw4694)  Requested for: 51Tfy3393 Ordered  7  Cephalexin 500 MG Oral Capsule; TAKE 1 CAPSULE TWICE DAILY; Therapy: 98Abs0523 to (Evaluate:31Azb3420)  Requested for: 59Roj3686; Last   Rx:72Ftz2731; Status: ACTIVE - Retrospective By Protocol Authorization Ordered  8  Clindamycin HCl - 300 MG Oral Capsule; take 2 caps  1 hr  prior to dental procedure; Therapy: 11KXF2462 to (Last Rx:74Qcz7958)  Requested for: 83Mun1776 Ordered  9  Doxazosin Mesylate 1 MG Oral Tablet; TAKE 1 TABLET Bedtime  Requested for:   73FWD5430; Last Rx:37Gvl7055 Ordered  10  DULoxetine HCl - 60 MG Oral Capsule Delayed Release Particles; TAKE 1 CAPSULE    TWICE DAILY; Therapy: 56LRV7262 to (Evaluate:18Zcl3581)  Requested for: 32JDY8584; Last    Rx:02Mar2017 Ordered  11  Folic Acid 1 MG Oral Tablet; TAKE 1 TABLET DAILY AS DIRECTED; Therapy: 46GFS5339 to (Edith Gordillo)  Requested for: 25BSP5692; Last    Rx:04Bxz9786 Ordered  12  Furosemide 20 MG Oral Tablet; TAKE 1 TABLET DAILY; Therapy: 59Zbc7804 to (Evaluate:06Xbz1909) Recorded  13  HumaLOG KwikPen 100 UNIT/ML Subcutaneous Solution Pen-injector; Inject 5 units 3    times daily with meals; Therapy: 07VNN0803 to (Evaluate:27Fra8051)  Requested for: 59Alx7096 Recorded  14  HydrALAZINE HCl - 50 MG Oral Tablet; TAKE 1 TABLET 3 TIMES DAILY; Therapy: 98YUT1979 to (Forest Campbell)  Requested for: 20GDH2773; Last    Rx:13Mar2017 Ordered  15  Lactulose 10 GM/15ML Oral Solution; TAKE 30 ML DAILY; Therapy: 31IQL4895 to (Last Rx:02Jun2017)  Requested for: 02Jun2017 Ordered  16  Levothyroxine Sodium 88 MCG Oral Tablet; take 1 tablet by mouth daily; Therapy: 63NMT4426 to (Evaluate:90Cbn5499)  Requested for: 90Xee5573 Recorded  17  LORazepam 2 MG Oral Tablet; Take 1 tablet 2 times daily as needed; Therapy: 89PWN8830 to (Evaluate:87Ojt4707)  Requested for: 10Aug2017 Recorded  18  Metoprolol Tartrate 50 MG Oral Tablet; take 1 tablet by mouth twice daily; Therapy: 10ORM6234 to (Evaluate:87Txh6491)  Requested for: 27Jun2017; Last    IJ:23QCL6238 Ordered  19  OneTouch Ultra Blue In [employer]; test twice daily; Therapy: 61DLN6067 to (Evaluate:91Wif0135)  Requested for: 66VMS2136; Last    Rx:11Aug2017 Ordered  20  OneTouch UltraSoft Lancets Miscellaneous; test twice daily; Therapy: 26OVP6358 to (Evaluate:46Teq4363)  Requested for: 57Chs5707 Recorded  21   Pravastatin Sodium 80 MG Oral Tablet; take 1 tablet by mouth every day; Therapy: 72PFP2314 to (Evaluate:20Fem5722)  Requested for: 15BKB0601; Last    Rx:22Jan2017 Ordered  22  PredniSONE 5 MG Oral Tablet; TAKE 1 1/2 TABLETS BY MOUTH EVERY DAY; Therapy: 89JEL2393 to (Evaluate:31Fga9268)  Requested for: 18ODG7967; Last    Rx:06Jun2017 Ordered  23  ROPINIRole HCl - 0 25 MG Oral Tablet; take 1 tablet by mouth twice daily; Therapy: 32YMZ8345 to (Abel Elder)  Requested for: 71Xcd1589; Last    Rx:03Usm3581 Ordered  24  Sertraline HCl - 100 MG Oral Tablet; TAKE 2 TABLET BY MOUTH DAILY; Therapy: 25RZF0996 to (03 17 74 30 53)  Requested for: 27ELG6717; Last    Rx:40Kts5037 Ordered  25  Sodium Bicarbonate 650 MG Oral Tablet; Take one tablet daily; Therapy: 57ILD2292 to (Evaluate:12Apr2016)  Requested for: 87SXI3090 Recorded  26  Tacrolimus 1 MG Oral Capsule; Take 4 in the morning and 3 in the evening  Requested    for: 75Ffl3657; Last Rx:37Kwm5233 Ordered  27  Toujeo SoloStar 300 UNIT/ML Subcutaneous Solution Pen-injector; INJECT 20 UNIT    Bedtime; Therapy: 47CLL7718 to (Evaluate:03Ycv3566)  Requested for: 57Quj8563 Recorded  28  TraZODone HCl - 150 MG Oral Tablet; TAKE 1 TABLET AT BEDTIME; Therapy: 54DXK5909 to (Abel Elder)  Requested for: 87Kkx8552; Last    Rx:78Jsv9770 Ordered  29  Vitamin D3 1000 UNIT Oral Capsule; TAKE 3 PILLS AT NOON BY MOUTH; Therapy: (Recorded:17Jun2016) to Recorded    Allergies   1  Penicillins  2  Morphine Derivatives  3  Duricef TABS  4   Myrbetriq JQ74    Signatures   Electronically signed by : EDGAR Barahona ; Aug 31 2017  4:58PM EST                       (Author)

## 2018-01-15 NOTE — MISCELLANEOUS
Assessment    1  Chronic kidney disease, stage 3 (585 3) (N18 3)   2  UTI (urinary tract infection), bacterial (599 0,041 9) (N39 0,A49 9)   3  Encephalopathy (348 30) (G93 40)   4  Hospital discharge follow-up (V67 59) (Z09)   5  Depression with anxiety (300 4) (F41 8)   6  Diabetes mellitus with nephropathy (250 40,583 81) (E11 21)   7  History of Surgery Left Foot Amputation   8  Unsteady gait (781 2) (R26 81)    Plan  Anemia    · Folic Acid 1 MG Oral Tablet; TAKE 1 TABLET DAILY AS DIRECTED   Rx By: Edna Mei; Dispense: 90 Days ; #:90 TAB; Refill: 3; For: Anemia; JACK = N; Verified Transmission to [facility]/PHARMACY #0080 Last Updated By: Gary Prieto; 5/10/2016 9:37:34 AM  Atrial fibrillation, Hypertension    · HydrALAZINE HCl - 50 MG Oral Tablet; TAKE 1 TABLET 3 TIMES DAILY   Rx By: Paige Renae; Dispense: 90 Days ; #:270 Tablet; Refill: 3; For: Atrial fibrillation, Hypertension; JACK = N; Verified Transmission to [facility]/PHARMACY #4103 Last Updated By: Gary Prieto; 5/10/2016 9:37:35 AM  Kaiser esophagus    · Pantoprazole Sodium 40 MG Oral Tablet Delayed Release; TAKE 1 TABLET  DAILY   Rx By: Edna Mei; Dispense: 30 Days ; #:30 Tablet Delayed Release; Refill: 5; For: Kaiser esophagus; JACK = N; Verified Transmission to [address] [place] [address]; Last Updated By: Gary Prieto; 5/10/2016 9:37:34 AM  Benign hypertensive CKD    · Metoprolol Tartrate 50 MG Oral Tablet; take 1 tablet by mouth twice daily   Rx By: Paige Renae; Dispense: 90 Days ; #:180 TAB; Refill: 0; For: Benign hypertensive CKD; JACK = N; Verified Transmission to [address] [place] [address]; Last Updated By: Gary Prieto; 5/10/2016 9:37:35 AM  Benign hypertensive CKD, Chronic kidney disease, stage 3    · Catapres 0 3 MG Oral Tablet (CloNIDine HCl); TAKE 0 3MG BY MOUTH 2  TIMES A DAY   INDICATIONS: 0 3MG IN AM, 0 2MG AT NOON, AND 0 3MG IN PM   Rx By: Paige See; Dispense: 0 Days ; #: Sufficient X 100 Tablet Bottle; Refill: 0; For: Benign hypertensive CKD, Chronic kidney disease, stage 3; JACK = N; Record; Last Updated By: Maxine Marrufo; 5/10/2016 9:37:32 AM   · Sodium Bicarbonate 650 MG Oral Tablet; Take one tablet daily   Rx By: Andriy Angel; Dispense: 90 Days ; #:360 Tablet; Refill: 0; For: Benign hypertensive CKD, Chronic kidney disease, stage 3; JACK = N; Record; Last Updated By: Maxine Marrufo; 5/10/2016 9:37:34 AM  Benign hypertensive CKD, Drug-induced Essential Tremor    · AmLODIPine Besylate 5 MG Oral Tablet; TAKE 1 TABLET TWICE DAILY   Rx By: Amelia Funes; Dispense: 0 Days ; #: Sufficient Tablet; Refill: 0; For: Benign hypertensive CKD, Drug-induced Essential Tremor; JACK = N; Record; Last Updated By: Maxine Marrufo; 5/10/2016 9:37:35 AM  Bilateral leg edema    · Furosemide 40 MG Oral Tablet; Take one tablet every morning and 1/2 tablet  every evening   Rx By: Andriy Angel; Dispense: 30 Days ; #:60 Tablet; Refill: 4; For: Bilateral leg edema; JACK = N; Record; Last Updated By: Maxine Marrufo; 5/10/2016 9:37:35 AM  Depression with anxiety    · *VB-Depression Screening; Status:Complete;   Done: 98CWW6672 09:28AM   Performed: In Office; OAK:[id number];LFSIWMY; For:Depression with anxiety; Ordered By:Lenny Padron;  Diabetes mellitus with nephropathy    · *VB-Foot Exam; Status:Complete;   Done: 63IWX2984 09:27AM   Performed: In Office; VFO:69YFR0633;SOFTIOI; For:Diabetes mellitus with nephropathy; Ordered By:Rufina Padron;  Diabetes mellitus with nephropathy, PMH: Surgery Left Foot Amputation, Unsteady gait    · *1 - [facility] Physical Therapy  Consult  Status: Hold For - Scheduling   Requested for: 41OXX5530   Ordered; For: Diabetes mellitus with nephropathy, PMH: Surgery Left Foot Amputation, Unsteady gait;  Ordered By: Maxine Marrufo Performed:  Due: 22STB4611  Care Summary provided  : Yes  Diabetic polyneuropathy    · OneTouch UltraSoft Lancets Miscellaneous; test twice daily   Rx By: Dayanara Macedo; Dispense: 90 Days ; #:2 X 100 Miscellaneous Box; Refill: 3; For: Diabetic polyneuropathy; JACK = N; Verified Transmission to [address] [place] [address]; Msg to Pharmacy: E11 42; Last Updated By: Oliver Laureano; 5/10/2016 9:37:36 AM  FELIPE (generalized anxiety disorder)    · LORazepam 2 MG Oral Tablet; TAKE 1 TABLET TWICE DAILY AS NEEDED   Rx By: Wilkins Phoenix; Dispense: 90 Days ; #:180 Tablet; Refill: 1; For: FELIPE (generalized anxiety disorder); JACK = N; Print Rx; Last Updated By: Oliver Laureano; 5/10/2016 9:37:35 AM  FELIPE (generalized anxiety disorder), Major depressive disorder, recurrent episode, in full  remission    · DULoxetine HCl - 60 MG Oral Capsule Delayed Release Particles (Cymbalta);  TAKE 1 CAPSULE TWICE DAILY   Rx By: Wilkins Phoenix; Dispense: 90 Days ; #:180 Capsule Delayed Release Particles; Refill: 1; For: FELIPE (generalized anxiety disorder), Major depressive disorder, recurrent episode, in full remission; JACK = N; Verified Transmission to [facility]/PHARMACY #6902 Last Updated By: Oliver Laureano; 5/10/2016 9:37:32 AM  FEILPE (generalized anxiety disorder), Major depressive disorder, recurrent episode, in full  remission, Post traumatic stress disorder    · Sertraline HCl - 100 MG Oral Tablet; TAKE 2 TABLETS DAILY   Rx By: Wilkins Phoenix; Dispense: 90 Days ; #:180 Tablet; Refill: 1; For: FELIPE (generalized anxiety disorder), Major depressive disorder, recurrent episode, in full remission, Post traumatic stress disorder; JACK = Y; Verified Transmission to [facility]/PHARMACY #2782 Last Updated By: Oliver Laureano; 5/10/2016 9:37:34 AM  H/O kidney transplant    · PredniSONE 5 MG Oral Tablet; TAKE ONE AND 1/2 TABLETS BY MOUTH DAILY   Rx By: Nae Bryan; Dispense: 30 Days ; #:45 TAB; Refill: 0; For: H/O kidney transplant; JACK = Y; Verified Transmission to [place] [id number];  Last Updated By: Oliver Laureano; 5/10/2016 9:37:33 AM  Health Maintenance    · Aspirin 81 MG TABS; TAKE 1 TABLET DAILY   Rx By: Rachel Harrington; Dispense: 30 Days ; #:30 Tablet; Refill: 5; For: Health Maintenance; JACK = N; Record; Last Updated By: Sherri Paige; 5/10/2016 9:37:34 AM   · Vitamin D3 3000 UNIT Oral Tablet; 1 TAB DAILY   Rx By: Smith Bloch; Dispense: 30 Days ; #:30 Tablet; Refill: 5; For: Health Maintenance; JACK = N; Record; Last Updated By: Sherri Paige; 5/10/2016 9:37:35 AM  Hyperlipidemia    · Pravastatin Sodium 80 MG Oral Tablet; take 1 tablet by mouth every day   Rx By: Smith Bloch; Dispense: 30 Days ; #:60 Tablet; Refill: 6; For: Hyperlipidemia; JACK = N; Verified Transmission to [address] [place] [address]; Last Updated By: Sherri Paige; 5/10/2016 9:37:33 AM  Hypothyroidism    · Levothyroxine Sodium 75 MCG Oral Tablet; TAKE 1 TABLET DAILY AS  DIRECTED   Rx By: Smith Bloch; Dispense: 0 Days ; #: Sufficient Tablet; Refill: 0; For: Hypothyroidism; JACK = N; Record; Last Updated By: Sherri Paige; 5/10/2016 9:37:35 AM  Insomnia, Major depressive disorder, recurrent episode, in full remission    · TraZODone HCl - 150 MG Oral Tablet; TAKE 1 TABLET AT BEDTIME   Rx By: Ulysses Hobbs; Dispense: 90 Days ; #:90 Tablet; Refill: 1; For: Insomnia, Major depressive disorder, recurrent episode, in full remission; JACK = N; Verified Transmission to [facility]/PHARMACY #0836 Last Updated By: Sherri Paige; 5/10/2016 9:37:34 AM  Major depressive disorder, recurrent episode, in full remission    · ARIPiprazole 20 MG Oral Tablet; TAKE 1 TABLET AT BEDTIME   Rx By: Ulysses Hobbs; Dispense: 90 Days ; #:90 Tablet; Refill: 1; For: Major depressive disorder, recurrent episode, in full remission; JACK = N; Verified Transmission to [facility]/PHARMACY #1106  Last Updated By: Sherri Paige; 5/10/2016 9:37:35 AM  Restless legs syndrome    · ROPINIRole HCl - 0 25 MG Oral Tablet; take 1 tablet by mouth twice daily Rx By: Vesta [place]; Dispense: 30 Days ; #:60 Tablet; Refill: 5; For: Restless legs syndrome; JACK = N; Verified Transmission to [address] [place] [address]; Last Updated By: Gin Whitfield; 5/10/2016 9:37:35 AM  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    · OneTouch Ultra Blue In Vitro Strip; TEST TWICE A DAY   Rx By: Vesta [place]; Dispense: 0 Days ; #:2 X 100 Strip Box; Refill: 5; For: 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; JACK = N; Verified Transmission to [address] [place] [address]; Msg to Pharmacy: DX: E11 42; Last Updated By: Gin Whitfield; 5/10/2016 9:37:33 AM  Unlinked    · Keflex 500 MG Oral Capsule (Cephalexin Monohydrate); TAKE 1 CAPSULE BY  MOUTH EVERY 6 HOURS FOR 9 DAYS   Dispense: 0 Days ; #: Sufficient Capsule; Refill: 0; JACK = N; Record; Last Updated By: Gin Whitfield; 5/10/2016 9:37:36 AM   · Prograf 1 MG Oral Capsule (Tacrolimus)   Dispense: 0 Days ; #: Sufficient CAPS; Refill: 0; JACK = N; Record; Last Updated By: Gin Whitfield; 5/10/2016 9:37:36 AM   · Sucralfate 1 GM/10ML Oral Suspension; TAKE 10 ML EVERY 12 HOURS DAILY   Dispense: 0 Days ; #: Sufficient ML; Refill: 0; JACK = N; Record; Last Updated By: Gin Whitfield; 5/10/2016 9:37:36 AM   · Tylenol 325 MG Oral Tablet   Dispense: 0 Days ; #: Sufficient TABS; Refill: 0; JACK = N; Record; Last Updated By: Gin Whitfield; 5/10/2016 9:37:36 AM    Discussion/Summary  Discussion Summary:   UTI/ CKD- Continue Keflex as discussed  Increase PO fluids  Follow up with Dr Gerard Snowden tomorrow at 10:00am  Call Dr Gerard Snowden or our office with any dysuria, hematuria, fevers, chills, flank/abdominal pain  Encephalopathy- resolved in hospital      Depression- I spoke with Leonel Engel behavioral therapist in our office  He is going to contact the patient today to assess her depression and further management   Perry Alejandraion is to go to the ED with any severe depression/anxiety, HI/SI  Continue will Sertraline, Cymbalta, and Trazodone as discussed  Diabetic neuropathy / unsteady gait- Referral for physical therapy given to patient today  Continue to use cane for ambulation  Fall precautions at home  I discussed her home environment such as hand rails in her bathrooms, no throw carpets, proper foot wear  Follow up as scheduled with Dr April Gardner on 8/26/16 / prn  Medication SE Review and Pt Understands Tx: Possible side effects of new medications were reviewed with the patient/guardian today  The treatment plan was reviewed with the patient/guardian  The patient/guardian understands and agrees with the treatment plan      Chief Complaint  Chief Complaint Free Text Note Form: Patient was admitted 4/29/16 and discharged 5/03/16  Reason for admission: Confusion and disorientation due to UTI  In the hospital, the patient also had acute kidney injury & was noted to suffer from encephalopathy  Patient was discharged home and is feeling better  History of Present Illness  TCM Communication [facility]: The patient is being contacted for follow-up after hospitalization  She was hospitalized at [place] [address]  The date of admission: 4/29/16, date of discharge: 5/3/16  She was discharged to home  Medications reviewed and updated today  She scheduled a follow up appointment  Follow-up appointments with other specialists: Dr Malcolm nSell (Nephrology)  The patient is currently asymptomatic  Counseling was provided to the patient  Topics counseled included diagnostic results and importance of compliance with treatment  Communication performed and completed by Adore MILES: Patient presents by herself today for a LILLY  Was in the hospital from 4/29-5/3/16 for UTI and encephalopathy secondary to the UTI     Was also recently in the hospital from 4/19-4/25/16 for a UTI and was D/C'd to home with PO abx which the patient never took      Has a history of CKD III which is followed by Dr Sunshine Bobo  During her most recent hospital stay, she had acute kidney injury for which she was given IV fluids  Creatinine returned to baseline and decreased to 1 35 at time of discharge  Kim Cohn has an appointment with Dr Sunshine Bobo, nephrology, tomorrow, 5/11/16  Was D/C'd from the hospital on PO Keflex, 500mg, one tablet every 6 hours x 9 days  Tomorrow will be her last day of this per patient  States she is taking this as prescribed  Reports she is doing well  Denies dysuria, blood in urine  No cloudy urine per patient  Denies fevers, chills, flank pain/suprapubic pain  Is drinking fluids regularly  Reports she is feeling well overall but she is feeling a little more depressed since she was D/C'd from the hospital  Is being treated for depression and anxiety by Dr Janine Strickland with [address]  Sees her every 3 months  Doesn't see for another month  Is sleeping more than usual per patient  Appetite normal per patient  Denies HI/SI  Is currently taking Sertraline, Cymbalta, and Trazodone  Toe amputation on B/L feet (from diabetes)- gets special shoes made and has been feeling a little more "wobbly" on her feet  Just had her shoes checked by Zahira Waggoner with [employer] [facility] who recommended PT  Reports tingling in her toes  Does have a history of diabetic neuropathy  Is checking her blood sugars daily, ranging from 99-150s  Reports they have been a little higher since she has been sick  Denies any recent falls  Review of Systems  Complete-Female:   Constitutional: no fever, not feeling poorly, no chills and not feeling tired  Cardiovascular: no chest pain, no palpitations and no lower extremity edema  Respiratory: no shortness of breath, no cough and no wheezing  Gastrointestinal: no abdominal pain, no constipation and no diarrhea  Genitourinary: no dysuria, no pelvic pain and no vaginal discharge     Neurological: numbness and tingling of toes B/L, but no headache and no dizziness  Psychiatric: depression, but no anxiety  Endocrine: no muscle weakness  ROS Reviewed:   ROS reviewed  Active Problems    1  Abnormal EKG (794 31) (R94 31)   2  Acid reflux disease (530 81) (K21 9)   3  Acute on chronic diastolic congestive heart failure (428 33,428 0) (I50 33)   4  Anemia (285 9) (D64 9)   5  Atrial fibrillation (427 31) (I48 91)   6  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)   7  Benign hypertensive CKD (403 10) (I12 9)   8  Bilateral leg edema (782 3) (R60 0)   9  Bruit of left carotid artery (785 9) (R09 89)   10  Cataract (366 9) (H26 9)   11  Chronic diastolic congestive heart failure (428 32,428 0) (I50 32)   12  Chronic kidney disease, stage 3 (585 3) (N18 3)   13  Cocaine abuse in remission (305 63) (F14 10)   14  Cognitive changes (799 59) (R41 89)   15  Constipation (564 00) (K59 00)   16  Diabetes mellitus with nephropathy (250 40,583 81) (E11 21)   17  Diabetic Gastropathy (537 9)   18  Diabetic polyneuropathy (250 60,357 2) (E11 42)   19  Diabetic retinopathy (250 50,362 01) (E11 319)   20  Diabetic Ulcer Of The Left Foot (250 80)   21  ROD (dyspnea on exertion) (786 09) (R06 09)   22  Drug-induced Essential Tremor (333 1)   23  Dysuria (788 1) (R30 0)   24  Encounter for screening mammogram for breast cancer (V76 12) (Z12 31)   25  Esophagitis, reflux (530 11) (K21 0)   26  External Hemorrhoids (455 3)   27  FELIPE (generalized anxiety disorder) (300 02) (F41 1)   28  H/O kidney transplant (V42 0) (Z94 0)   29  Heart palpitations (785 1) (R00 2)   30  Hyperlipidemia (272 4) (E78 5)   31  Hyperplastic colon polyp (211 3) (K63 5)   32  Hypertension (401 9) (I10)   33  Hypothyroidism (244 9) (E03 9)   34  Increased white blood cell count (288 60) (D72 829)   35  Insomnia (780 52) (G47 00)   36  Irritable bowel syndrome (564 1) (K58 9)   37  Major depressive disorder, recurrent episode, in full remission (296 36) (F33 42)   38  Memory loss (780 93) (R41 3)   39  Metabolic acidosis (910 4) (E87 2)   40  MGUS (monoclonal gammopathy of unknown significance) (273 1) (D47 2)   41  Neck pain (723 1) (M54 2)   42  Nonrheumatic aortic valve insufficiency (424 1) (I35 1)   43  Osteopenia (733 90) (M85 80)   44  Osteoporosis (733 00) (M81 0)   45  Peripheral vascular disease (443 9) (I73 9)   46  Post traumatic stress disorder (309 81) (F43 10)   47  Proteinuria (791 0) (R80 9)   48  Rectal polyp (569 0) (K62 1)   49  Restless legs syndrome (333 94) (G25 81)   50  Secondary hyperparathyroidism, renal (588 81) (N25 81)   51  Sinus Tachycardia (427 89)   52  SOB (shortness of breath) (786 05) (R06 02)   53  Tremor (781 0) (R25 1)   54  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 (274 77) (X70 67)   55  Urinary tract infection (599 0) (N39 0)   56  UTI (urinary tract infection), bacterial (599 0,041 9) (N39 0,A49 9)   57  Vaginal itching (698 1) (L29 8)    Past Medical History    1  History of Abnormal Liver Function Test (790 6)   2  History of Abnormal urine (791 9) (R82 90)   3  History of Cervical Dysplasia (V13 22)   4  History of Denial Of Any Significant Medical History   5  History of Diabetes mellitus with foot ulcer (250 80,707 15) (E11 621,L97 509)   6  History of cholelithiasis (V12 79) (Z87 19)   7  History of gastritis (V12 79) (Z87 19)   8  Denied: History of head injury   9  History of hematuria (V13 09) (Z87 448)   10  History of hyperkalemia (V12 29) (Z86 39)   11  History of pneumonia (V12 61) (Z87 01)   12  History of seborrhea (V13 3) (Z87 2)   13  History of Hyponatremia (276 1) (E87 1)   14  History of Iliotibial band syndrome (728 89) (M76 30)   15  History of Infected tooth (522 4) (K04 7)   16  History of Lumbar radiculopathy (724 4) (M54 16)   17  Denied: History of Seizures   18  History of Trochanteric bursitis, unspecified laterality (726 5) (M70 60)   19   History of Urinary Tract Infection (V13 02)    Surgical History    1  History of Cataract Surgery   2  History of Cholecystectomy   3  History of Complete Colonoscopy   4  History of Complete Colonoscopy   5  History of Diagnostic Esophagogastroduodenoscopy   6  History of Diagnostic Esophagogastroduodenoscopy   7  History of Dilation And Curettage   8  History of Foot Surgery   9  History of Pancreatic Transplantation   10  Renal Transplant   11  History of Surgery Left Foot Amputation    Family History  Mother    1  No family history of mental disorder  Father    2  Family history of cancer (V16 9) (Z80 9)   3  No family history of mental disorder  Sister    4  Family history of depression (V17 0) (Z81 8)  Grandparent    5  Family history of cancer (V16 9) (Z80 9)  Maternal Grandmother    6  Family history of Breast Cancer (V16 3)    Social History    · Denied: History of Alcohol Use (History)   · Former smoker (B77 92) (L07 237)   · History of Uses cocaine  Social History Reviewed: The social history was reviewed and updated today  The social history was reviewed and is unchanged  Current Meds   1  AmLODIPine Besylate 5 MG Oral Tablet; TAKE 1 TABLET TWICE DAILY; Therapy: 94DWG5408 to (Evaluate:30Apr2017)  Requested for: 64PNJ3889 Recorded   2  ARIPiprazole 20 MG Oral Tablet; TAKE 1 TABLET AT BEDTIME; Therapy: 16HNJ5383 to (Evaluate:48Vfv3725)  Requested for: 12Apr2016; Last   Rx:18Feb2016 Ordered   3  Aspirin 81 MG TABS; TAKE 1 TABLET DAILY; Therapy: 51YYS9404 to (Evaluate:19Jun2016) Recorded   4  Catapres 0 3 MG Oral Tablet; TAKE 0 3MG BY MOUTH 2 TIMES A DAY  INDICATIONS:   0 3MG IN AM, 0 2MG AT NOON, AND 0 3MG IN PM;   Therapy: 46TQP8792 to (Evaluate:30Apr2017)  Requested for: 31DBT2519 Recorded   5  Citracal TABS; Take one tablet twice daily Recorded   6  Doxazosin Mesylate 1 MG Oral Tablet; TAKE 1 TABLET Bedtime; Therapy: 53ASP7920 to (Evaluate:16Mar2017)  Requested for: 12Apr2016; Last   Rx:21Mar2016 Ordered   7   DULoxetine HCl - 60 MG Oral Capsule Delayed Release Particles; TAKE 1 CAPSULE   TWICE DAILY; Therapy: 87JRJ8190 to (Evaluate:09Aug2016)  Requested for: 12Apr2016; Last   Rx:11Feb2016 Ordered   8  Folic Acid 1 MG Oral Tablet; TAKE 1 TABLET DAILY AS DIRECTED; Therapy: 57JBH4521 to (Evaluate:26Mjf6307)  Requested for: 12Apr2016; Last   Rx:73Viy7566 Ordered   9  Furosemide 40 MG Oral Tablet; Take one tablet every morning and 1/2 tablet every   evening; Therapy: 02XLR5741 to (Volodymyr Luo)  Requested for: 60BYD3090 Recorded   10  Generlac 10 GM/15ML SYRP Recorded   11  HydrALAZINE HCl - 50 MG Oral Tablet; TAKE 1 TABLET 3 TIMES DAILY; Therapy: 16OVU7876 to (Evaluate:26Jan2017)  Requested for: 12Apr2016; Last    Rx:01Feb2016 Ordered   12  Keflex 500 MG Oral Capsule; TAKE 1 CAPSULE BY MOUTH EVERY 6 HOURS FOR 9    DAYS; Therapy: (Recorded:83Lok1988) to Recorded   13  Levothyroxine Sodium 75 MCG Oral Tablet; TAKE 1 TABLET DAILY AS DIRECTED; Therapy: 72BFU2246 to  Requested for: 92XOJ7443 Recorded   14  LORazepam 2 MG Oral Tablet; TAKE 1 TABLET TWICE DAILY AS NEEDED; Therapy: 28HJI1761 to (Evaluate:09Aug2016); Last Rx:11Feb2016 Ordered   15  Magnesium  (64 Mg) MG TBCR; TAKE 1 TABLET DAILY; Therapy: 11JEQ2354 to (Evaluate:00Xci5668); Last Rx:05Jun2013 Ordered   16  Metoprolol Tartrate 50 MG Oral Tablet; take 1 tablet by mouth twice daily; Therapy: 92MEN8702 to (Evaluate:06Jun2016)  Requested for: 12Apr2016; Last    Rx:08Mar2016 Ordered   17  OneTouch Ultra Blue In [employer]; TEST TWICE A DAY; Therapy: 61CDZ1985 to (Evaluate:58Gic7674)  Requested for: 12Apr2016; Last    Rx:10Mar2016 Ordered   18  OneTouch UltraSoft Lancets Miscellaneous; test twice daily; Therapy: 80IDU6737 to (Benny Ruvalcaba)  Requested for: 12Apr2016; Last    Rx:09Mar2016 Ordered   19  Pravastatin Sodium 80 MG Oral Tablet; take 1 tablet by mouth every day;     Therapy: 99GXD9284 to (Evaluate:16Jml1871)  Requested for: 12Apr2016; Last    Rx:19Apr2015 Ordered   20  PredniSONE 5 MG Oral Tablet; TAKE ONE AND 1/2 TABLETS BY MOUTH DAILY; Therapy: 37NOB9615 to (Shalonda Grimm)  Requested for: 27Apr2016; Last    Rx:27Apr2016 Ordered   21  Prograf 1 MG Oral Capsule; Therapy: (Recorded:37Cqk1046) to Recorded   22  ROPINIRole HCl - 0 25 MG Oral Tablet; take 1 tablet by mouth twice daily; Therapy: 97Msl9197 to (Evaluate:12Cnw8235)  Requested for: 12Apr2016; Last    Rx:22Mar2016 Ordered   23  Sertraline HCl - 100 MG Oral Tablet; TAKE 2 TABLETS DAILY; Therapy: 08CAX0289 to (Evaluate:51Tug6125)  Requested for: 12Apr2016; Last    Rx:95Iwa1527 Ordered   24  Sodium Bicarbonate 650 MG Oral Tablet; Take one tablet daily; Therapy: 90ZKB2017 to (Evaluate:12Apr2016)  Requested for: 24ONF3873 Recorded   25  Sucralfate 1 GM/10ML Oral Suspension; TAKE 10 ML EVERY 12 HOURS DAILY; Therapy: (Recorded:10May2016) to Recorded   26  Tacrolimus 1 MG Oral Capsule; TAKE 4 CAPSULES every morning and 3 capsules every    evening; Therapy: 44HYX1927 to (Evaluate:54Ylh8246)  Requested for: 12Apr2016; Last    Rx:04Mar2016 Ordered   27  TraZODone HCl - 150 MG Oral Tablet; TAKE 1 TABLET AT BEDTIME; Therapy: 94GSJ5432 to (Evaluate:10Ihk3075)  Requested for: 12Apr2016; Last    Rx:80Jpv6202 Ordered   28  Tylenol 325 MG Oral Tablet; Therapy: (Recorded:94Vdj0160) to Recorded   29  Vitamin D3 3000 UNIT Oral Tablet; 1 TAB DAILY; Therapy: 21VVO0591 to (Evaluate:40Lyx0277) Recorded  Medication List Reviewed: The medication list was reviewed and updated today  Allergies    1  Penicillins   2  Morphine Derivatives   3   Duricef TABS    Vitals  Signs [Data Includes: Current Encounter]   Recorded: 80WZU6522 21:65EE   Systolic: 399  Diastolic: 84  Recorded: 40NFE2936 08:40AM   Temperature: 98 4 F  Heart Rate: 68  Respiration: 18  Systolic: 998  Diastolic: 78  Height: 5 ft 9 in  Weight: 209 lb 8 0 oz  BMI Calculated: 30 94  BSA Calculated: 2 1  O2 Saturation: 96  Pain Scale: 0    Physical Exam    Constitutional   General appearance: No acute distress, well appearing and well nourished  Ears, Nose, Mouth, and Throat   Oropharynx: Normal with no erythema, edema, exudate or lesions  Oral mucosa was moist    Pulmonary   Respiratory effort: No increased work of breathing or signs of respiratory distress  Auscultation of lungs: Clear to auscultation  Cardiovascular   Auscultation of heart: Normal rate and rhythm, normal S1 and S2, without murmurs  Examination of extremities for edema and/or varicosities: Normal     Carotid pulses: Normal     Abdomen   Abdomen: Non-tender, no masses  no CVA tenderness   Liver and spleen: No hepatomegaly or splenomegaly  Lymphatic   Palpation of lymph nodes in neck: No lymphadenopathy  Musculoskeletal ambulates with cane  Slow gait  Skin   Skin and subcutaneous tissue: Normal without rashes or lesions  Neurologic   Cranial nerves: Cranial nerves 2-12 intact  Reflexes: 2+ and symmetric  Sensation: No sensory loss  Psychiatric   Mood and affect: Normal     Diabetic Foot Screen: Abnormal     Socks and shoes removed, the Right Foot: the foot was normal, no swelling, no erythema  The sensory exam showed diminished vibratory sensation at the level of the toes  Diminished tactile sensation with monofilament testing throughout the right foot  Socks and shoes removed, Left Foot: the foot was normal, no swelling, no erythema  The sensory exam showed diminished vibratory sensation at the level of the toes  Diminished tactile sensation with monofilament testing throughout the left foot  Pulses:   2+ in the dorsalis pedis on the right  Pulses:   2+ in the dorsalis pedis on the left        Results/Data  Encounter Results   *VB-Depression Screening [id number] 09:28AM Mane Lees     Test Name Result Flag Reference   Depression Scale Result      Depression Screen - Positive Findings     *VB-Foot Exam [id number] 09: 27AM [place]     Test Name Result Flag Reference   Ange Serve [id number]         Attending Note  Collaborating Physician Note: Collaborating Physician: I agree with the Advanced Practitioner note  Future Appointments    Date/Time Provider Specialty Site   08/26/2016 10:00 AM EDGAR Hartmann  [address]   07/27/2016 04:15 PM EDGAR English  Psychiatry [facility]   05/11/2016 10:00 AM EDGAR Cristina  Nephrology [facility]   05/18/2016 09:00 AM EDGAR Sanchez  Cardiology [facility]  [place] [address]     Signatures   Electronically signed by : MER Agudelo; May 10 2016  9:37AM EST                       (Author)    Electronically signed by :  Selvin Tello MD; May 10 2016 11:52AM EST                       (Author)

## 2018-01-15 NOTE — RESULT NOTES
Message   Cr stable  BUN slightly higher  vol depleted? saw Endocrine, reviewed notes  tac appropriate  C peptide present  cont monthly labwork from renal standpoint        Verified Results  (1) BASIC METABOLIC PROFILE [id number] 06:38AM Pebbles Manriquez     Test Name Result Flag Reference   SODIUM 137 mmol/L  136-145   POTASSIUM 3 7 mmol/L  3 5-5 3   CHLORIDE 109 mmol/L H 100-108   CARBON DIOXIDE 22 mmol/L  21-32   ANION GAP (CALC) 6 mmol/L  4-13   BLOOD UREA NITROGEN 44 mg/dL H 5-25   CREATININE 1 45 mg/dL H 0 60-1 30   Standardized to IDMS reference method   CALCIUM 9 3 mg/dL  8 3-10 1   eGFR 41 ml/min/1 73sq m     National Kidney Disease Education Program recommendations are as follows:  GFR calculation is accurate only with a steady state creatinine  Chronic Kidney disease less than 60 ml/min/1 73 sq  meters  Kidney failure less than 15 ml/min/1 73 sq  meters  GLUCOSE FASTING 153 mg/dL H 65-99   Specimen collection should occur prior to Sulfasalazine administration due to the potential for falsely depressed results  Specimen collection should occur prior to Sulfapyridine administration due to the potential for falsely elevated results  (1)  TACROLIMUS [id number] 06:38AM Pebbles Manriquez    Order Number: [id number]     Test Name Result Flag Reference    4 3 ng/mL  2 0 - 20 0   Trough (immediately following                  transplant)                       15 0          Trough (steady state, 2 weeks or                  more after transplant):      3 0 - 8 0                                   Detection Limit = 1 0          Performed by LC-MS/MS technology      Performed at:  [address]  [id number]  : Mulu Arriaga MD, Phone:  [phone] (1) [address], SERUM [id number] 06:38AM Erik Warner Order Number: [id number]     Test Name Result Flag Reference   FREE KAPPA LIGHT CHAINS, SERUM 49 1 mg/L H 3 3 - 19 4   FREE LAMBDA LIGHT CHAINS, SERUM 18 4 mg/L  5 7 - 26 3   KAPPA/LAMBDA RATIO, SERUM 2 67 H 0 26 - 1 65   Performed at:  [address] [place] [address]  [id number]  : Get Evangelista MD, Phone:  [phone]     (1) AMYLASE [id number] 06:38AM Sage Greening     Test Name Result Flag Reference   AMYLASE 54 IU/L       (1) LIPASE [id number] 06:38AM Sage Greening     Test Name Result Flag Reference   LIPASE 224 u/L       (1) C-PEPTIDE 86ZUK3400 06:38AM Sage Greening   TW Order Number: [id number]     Test Name Result Flag Reference   C PEPTIDE 4 0 ng/mL  1 1 - 4 4   C-Peptide reference interval is for fasting patients      Performed at:  [address] [place] [address]  [id number]  : Get Evangelista MD, Phone:  [phone]     (1) C4 COMPLEMENT [id number] 06:38AM Sage Greening     Test Name Result Flag Reference   C4 COMPLEMENT 31 0 mg/dL  10 0-40 0     (1) C3 COMPLEMENT [id number] 06:38AM Sage Greening     Test Name Result Flag Reference   C3 COMPLEMENT 110 0 mg/dL  90 0-180 0       Plan  Benign hypertensive CKD    · AmLODIPine Besylate 10 MG Oral Tablet; take 1 tablet in am and 1/2 tablet in  pm

## 2018-01-15 NOTE — RESULT NOTES
Message  I called patient with Carotid Doppler report  Recommend to continue ASA 81 mg daily  Will recheck Carotid Doppler in 1 year  Verified Results  VAS CAROTID COMPLETE STUDY 22Nov2016 08:33AM Lolita Benz Order Number: [id number]     Test Name Result Flag Reference   VAS CAROTID COMPLETE STUDY (Report)     [facility] REPORT   CLINICAL:   Indications: Bilateral Bruit [R09 89]  Patient presents for a general health evaluation secondary to other   cardiovascular symptoms  Patient is asymptomatic from a cerebral vascular standpoint   Operative History   8/7/1998 kidney/pancreas transplant   B/L great toe amputations - osteomyelitis   Risk Factors   The patient has history of HTN and hyperlipidemia  She has no history of   Diabetes  The patient current Height (in) is 67 in  Clinical   Right Pressure: 160/60 mm Hg, Left Pressure: 152/76 mm Hg  FINDINGS:      Right    Impression PSV EDV (cm/s) Direction of Flow Ratio    Dist  ICA         93     19           0 91    Prox  ICA  1 - 49%   71     18           0 71    Dist CCA         78     18                 Mid CCA         101     14           0 83    Prox CCA         122      0                 Ext Carotid        92      0           0 91    Prox Vert         75     17 Antegrade            Subclavian        208      0                    Left     PSV EDV (cm/s) Direction of Flow Ratio    Dist  ICA   97     26           0 95    Prox  ICA   73     14           0 72    Dist CCA   108     18                 Mid CCA   102     16           0 68    Prox CCA   151      0                 Ext Carotid 154     16           1 50    Prox Vert   94     19 Antegrade            Subclavian  307     22                          CONCLUSION:      Impression   RIGHT:   There is <50% stenosis noted in the internal carotid artery  Plaque is   homogenous and smooth  Vertebral artery flow is antegrade  There is no significant subclavian artery   disease  LEFT:   There is no evidence of significant stenosis noted  Vertebral artery flow is antegrade  There is no significant subclavian artery   disease  Internal carotid artery stenosis determination by consensus criteria from:   Gustavo Rodriguez et al  Carotid Artery Stenosis: Gray-Scale and Doppler US Diagnosis   - Society of Radiologists in [address] [place] [address], Radiology 2003;   [phone]        SIGNATURE:   Electronically Signed by: Rishabh Melissa on 2016-11-22 11:36:03 AM       Signatures   Electronically signed by : EDGAR Mike ; Nov 22 2016  1:07PM EST                       (Author)

## 2018-01-15 NOTE — MISCELLANEOUS
Message   Recorded as Task   Date: 09/20/2016 11:52 AM, Created By: Dede Garcia   Task Name: Miscellaneous   Assigned To: Leafy Leaf   Regarding Patient: Alisa Islas, Status: Complete   Comment:    Ana Maria Stark - 20 Sep 2016 11:52 AM     TASK CREATED  Pt mom dropped by with questions re pt's tacrolimus medication, she states that upon d/c from [facility] she was infromed that the pt should take 1mg of tacrolimus BID however since pt recieved kidney transplant her dosage has been 1mg 4 in the morning and 3 in the evening  Per Dr Mane Zamarripa request the Tacrolimus has been changed back to the original [facility]) has been notified of the change and a Tacrolimus level has been ordered for tomorrow monring prior to dosing and 1 week after  Lab alips have been faxed to [place] [address] 9/20/2016   Sundeep Boyle - 20 Sep 2016 1:11 PM     TASK COMPLETED        Active Problems    1  Abnormal EKG (794 31) (R94 31)   2  Acid reflux disease (530 81) (K21 9)   3  Allergic urticaria (708 0) (L50 0)   4  Anemia (285 9) (D64 9)   5  Antibiotic prophylaxis for dental procedure indicated due to prior joint replacement   (V58 62) (Z79 2)   6  Atrial fibrillation (427 31) (I48 91)   7  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)   8  Benign hypertensive CKD (403 10) (I12 9)   9  Bilateral leg edema (782 3) (R60 0)   10  Bruit of left carotid artery (785 9) (R09 89)   11  Cataract (366 9) (H26 9)   12  Chronic diastolic congestive heart failure (428 32,428 0) (I50 32)   13  Chronic kidney disease, stage 3 (585 3) (N18 3)   14  Cocaine abuse in remission (305 63) (F14 10)   15  Cognitive changes (799 59) (R41 89)   16  Constipation (564 00) (K59 00)   17  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, without long-term    current use of insulin (250 61) (E10 49)   18  Depression with anxiety (300 4) (F41 8)   19  Diabetes mellitus with nephropathy (250 40,583 81) (E11 21)   20   Diabetic Gastropathy (537 9)   21  Diabetic polyneuropathy (250 60,357 2) (E11 42)   22  Diabetic retinopathy (250 50,362 01) (E11 319)   23  Diabetic Ulcer Of The Left Foot (250 80)   24  ROD (dyspnea on exertion) (786 09) (R06 09)   25  Drug-induced Essential Tremor (333 1)   26  Dysuria (788 1) (R30 0)   27  Encephalopathy (348 30) (G93 40)   28  Encounter for screening mammogram for breast cancer (V76 12) (Z12 31)   29  Esophagitis, reflux (530 11) (K21 0)   30  External Hemorrhoids (455 3)   31  FELIPE (generalized anxiety disorder) (300 02) (F41 1)   32  H/O kidney transplant (V42 0) (Z94 0)   33  Heart palpitations (785 1) (R00 2)   34  Hospital discharge follow-up (V67 59) (Z09)   35  Hyperlipidemia (272 4) (E78 5)   36  Hyperplastic colon polyp (211 3) (K63 5)   37  Hypertension (401 9) (I10)   38  Hypothyroidism (244 9) (E03 9)   39  Increased frequency of urination (788 41) (R35 0)   40  Increased white blood cell count (288 60) (D72 829)   41  Insomnia (780 52) (G47 00)   42  Irritable bowel syndrome (564 1) (K58 9)   43  Major depressive disorder, recurrent episode, in full remission (296 36) (F33 42)   44  Memory loss (780 93) (R41 3)   45  Memory loss or impairment (780 93) (R41 3)   46  Metabolic acidosis (463 1) (E87 2)   47  MGUS (monoclonal gammopathy of unknown significance) (273 1) (D47 2)   48  Neck pain (723 1) (M54 2)   49  Need for influenza vaccination (V04 81) (Z23)   50  Nonrheumatic aortic valve insufficiency (424 1) (I35 1)   51  OAB (overactive bladder) (596 51) (N32 81)   52  Osteopenia (733 90) (M85 80)   53  Osteoporosis (733 00) (M81 0)   54  Peripheral vascular disease (443 9) (I73 9)   55  Post traumatic stress disorder (309 81) (F43 10)   56  Proteinuria (791 0) (R80 9)   57  Rectal polyp (569 0) (K62 1)   58  Recurrent UTI (599 0) (N39 0)   59  Restless legs syndrome (333 94) (G25 81)   60  Secondary hyperparathyroidism, renal (588 81) (N25 81)   61  Sinus Tachycardia (427 89)   62   SOB (shortness of breath) (786 05) (R06 02)   63  Tremor (781 0) (R25 1)   64  Unsteady gait (781 2) (R26 81)    Current Meds   1  AmLODIPine Besylate 5 MG Oral Tablet; TAKE 1 TABLET TWICE DAILY; Therapy: 60MAV0855 to (Evaluate:30Apr2017)  Requested for: 92XAO1235 Recorded   2  ARIPiprazole 20 MG Oral Tablet; TAKE 1 TABLET AT BEDTIME; Therapy: 43GZM5631 to (Evaluate:63Xmz7447)  Requested for: 75TSP2905; Last   Rx:81Dib5758 Ordered   3  Aspirin 81 MG TABS; TAKE 1 TABLET DAILY; Therapy: 75YWU2965 to (Evaluate:19Jun2016) Recorded   4  Benadryl 25 MG Oral Tablet; TAKE 1 TABLET AT BEDTIME; Therapy: 25Bro8641 to (Complete:22Sep2016); Last Rx:12Sep2016 Ordered   5  Caltrate 600 TABS; TAKE 1 TABLET TWICE DAILY; Therapy: (Recorded:17Jun2016) to Recorded   6  Catapres 0 3 MG Oral Tablet (CloNIDine HCl); TAKE 0 3MG BY MOUTH 2 TIMES A DAY  INDICATIONS: 0 3MG IN AM, 0 2MG AT NOON, AND 0 3MG IN PM;   Therapy: 89PER6113 to (Evaluate:30Apr2017)  Requested for: 27JBY5723 Recorded   7  Doxazosin Mesylate 1 MG Oral Tablet; TAKE 1 TABLET AT BEDTIME; Therapy: (Recorded:17Jun2016) to Recorded   8  DULoxetine HCl - 60 MG Oral Capsule Delayed Release Particles (Cymbalta); TAKE 1   CAPSULE TWICE DAILY; Therapy: 38VEP3171 to (Evaluate:62Uwg6970)  Requested for: 77JLL7467; Last   Rx:32Qiw6410 Ordered   9  Folic Acid 1 MG Oral Tablet; TAKE 1 TABLET DAILY AS DIRECTED; Therapy: 07WIB8398 to (Evaluate:58Clv3664)  Requested for: 17EQL6388; Last   Rx:69Qye2204 Ordered   10  Furosemide 40 MG Oral Tablet; TAKE 1 TO 2 TABLETS DAILY AS NEEDED FOR    EDEMA; Therapy: 49SKJ0928 to (Evaluate:25Jun2017)  Requested for: 12JBQ1539; Last    Rx:30Jun2016 Ordered   11  HydrALAZINE HCl - 50 MG Oral Tablet; TAKE 1 TABLET 3 TIMES DAILY; Therapy: 44VDH9918 to (Evaluate:26Jan2017)  Requested for: 01HAU8455; Last    Rx:95Mnm8411 Ordered   12  Lactulose 10 GM/15ML Oral Solution; TAKE 30 ML DAILY; Therapy: (Recorded:17Jun2016) to Recorded   13   Levothyroxine Sodium 88 MCG Oral Tablet; take 1 tablet by mouth daily; Therapy: 16SVA6081 to (Maki Bautista)  Requested for: 32Uyt7012; Last    Rx:11Qbb3435 Ordered   14  LORazepam 2 MG Oral Tablet; TAKE 1 TABLET TWICE DAILY AS NEEDED; Therapy: 66HWJ0179 to (Evaluate:21Dvw8464); Last Rx:92Qvl3760 Ordered   15  Metoprolol Tartrate 50 MG Oral Tablet; take 1 tablet by mouth twice daily; Therapy: 40IFF7646 to (Evaluate:09Jun2017)  Requested for: 14VID2663; Last    Rx:25Scd7504 Ordered   16  OneTouch Ultra Blue In [employer]; TEST TWICE A DAY; Therapy: 47LDT2047 to (Evaluate:92Ysz0300)  Requested for: 03ZOJ9930; Last    Rx:10Mar2016 Ordered   17  OneTouch UltraSoft Lancets Miscellaneous; test twice daily; Therapy: 43JQV1593 to (Murali Woods)  Requested for: 46HDR8134; Last    Rx:09Mar2016 Ordered   18  Pantoprazole Sodium 40 MG Oral Tablet Delayed Release; take 1 tablet by mouth every    day; Therapy: 03GUU8335 to (Evaluate:39Qhj9361)  Requested for: 60HWW9907; Last    Rx:87Pul8964 Ordered   19  Pravastatin Sodium 80 MG Oral Tablet; take 1 tablet by mouth every day; Therapy: 01VOT7289 to (Jaime Knox)  Requested for: 80MOO0880; Last    EZ:96UXX9365 Ordered   20  PredniSONE 5 MG Oral Tablet; TAKE ONE AND 1/2 TABLETS BY MOUTH DAILY; Therapy: 32CZW5843 to (Bonita Jere)  Requested for: 70EPJ9979; Last    Rx:24Osz8365 Ordered   21  Prograf 1 MG Oral Capsule (Tacrolimus); Take 4 in the morning and 3 in the evening; Therapy: (Recorded:46Xan4305) to Recorded   22  ROPINIRole HCl - 0 25 MG Oral Tablet; take 1 tablet by mouth twice daily; Therapy: 55NQQ5258 to (Evaluate:56Nps9431)  Requested for: 45TBP4992; Last    Rx:73Fqq0943 Ordered   23  Sertraline HCl - 100 MG Oral Tablet; TAKE 2 TABLETS DAILY; Therapy: 32KBB9156 to (Evaluate:49Bnt9018)  Requested for: 88ICV5775; Last    Rx:56Haj5880 Ordered   24  Sodium Bicarbonate 650 MG Oral Tablet; Take one tablet daily;     Therapy: 81XRQ5618 to (Evaluate:12Apr2016)  Requested for: 08KGW6365 Recorded   25  TraZODone HCl - 150 MG Oral Tablet; TAKE 1 TABLET AT BEDTIME; Therapy: 60NEX5432 to (Melvin Slight)  Requested for: 71Eqy2931; Last    Rx:74Tzt6053 Ordered   26  Vitamin D3 1000 UNIT Oral Capsule; TAKE 3 PILLS AT NOON BY MOUTH; Therapy: (Recorded:17Jun2016) to Recorded    Allergies    1  Penicillins   2  Morphine Derivatives   3  Duricef TABS   4   Myrbetriq LW31    Signatures   Electronically signed by : EDGAR Trevino ; Sep 20 2016  2:55PM EST

## 2018-01-15 NOTE — CONSULTS
Chief Complaint  MGUS      History of Present Illness  58-year-old  female with history of type 1 diabetes mellitus, diabetic neuropathy, status post kidney and pancreatic transplant in 1998 at [address], amputation of the right big toe, cholecystectomy vitamin D deficiency, most recently she has exacerbation of diabetes mellitus was possible pancreatic burn out currently back on insulin, as a workup she was found to have abnormal serum protein electrophoresis on August 2017 With 2 peaks of IgG kappa, the first one 0 54 g/dL and the second one 2 27 g/dL  Urine protein electrophoresis showed a faint possible band of 4 3 mg/dL consistent with kappa light chain  Ex-smoker and smoked for 15 years quit 4 years ago, she does not drink alcohol  Family history significant for skin cancer in father      Review of Systems    Constitutional: feeling tired, but no fever, no chills and no recent weight loss  Eyes: eyesight problems  ENT: no complaints of earache, no loss of hearing, no nose bleeds, no nasal discharge, no sore throat, no hoarseness  Cardiovascular: No complaints of slow heart rate, no fast heart rate, no chest pain, no palpitations, no leg claudication, no lower extremity edema  Respiratory: shortness of breath during exertion  Gastrointestinal: No complaints of abdominal pain, no constipation, no nausea or vomiting, no diarrhea, no bloody stools  Genitourinary: No complaints of dysuria, no incontinence, no pelvic pain, no dysmenorrhea, no vaginal discharge or bleeding  Musculoskeletal: no arthralgias  Integumentary: No complaints of skin rash or lesions, no itching, no skin wounds, no breast pain or lump  Neurological: No complaints of headache, no confusion, no convulsions, no numbness, no dizziness or fainting, no tingling, no limb weakness, no difficulty walking  Psychiatric: depression     Endocrine: No complaints of proptosis, no hot flashes, no muscle weakness, no deepening of the voice, no feelings of weakness  Hematologic/Lymphatic: no swollen glands, no tendency for easy bleeding and no swollen glands in the neck  Active Problems    1  Abnormal EKG (794 31) (R94 31)   2  Acid reflux disease (530 81) (K21 9)   3  Acute kidney injury superimposed on CKD (866 00,585 9) (N17 9,N18 9)   4  Acute UTI (599 0) (N39 0)   5  Anemia (285 9) (D64 9)   6  Antibiotic prophylaxis for dental procedure indicated due to prior joint replacement   (V58 62) (Z79 2)   7  Atrial fibrillation (427 31) (I48 91)   8  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)   9  Benign hypertensive CKD (403 10) (I12 9)   10  Bilateral carotid bruits (785 9) (R09 89)   11  Bilateral leg edema (782 3) (R60 0)   12  Bruit of left carotid artery (785 9) (R09 89)   13  Cataract (366 9) (H26 9)   14  Chronic constipation (564 00) (K59 09)   15  Chronic diastolic congestive heart failure (428 32,428 0) (I50 32)   16  Chronic kidney disease, stage 4 (severe) (585 4) (N18 4)   17  Cocaine abuse in remission (305 63) (F14 10)   18  Cognitive changes (799 59) (R41 89)   19  Colon cancer screening (V76 51) (Z12 11)   20  Constipation (564 00) (K59 00)   21  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, without long-term    current use of insulin (250 61) (E10 49)   22  Diabetes mellitus with diabetic nephropathy (250 40,583 81) (E11 21)   23  Diabetic Gastropathy (537 9)   24  Diabetic polyneuropathy (250 60,357 2) (E11 42)   25  Diabetic retinopathy (250 50,362 01) (E11 319)   26  Diabetic Ulcer Of The Left Foot (250 80)   27  Diastolic dysfunction (593 7) (I51 9)   28  ROD (dyspnea on exertion) (786 09) (R06 09)   29  Drug-induced Essential Tremor (333 1)   30  Encounter for screening mammogram for breast cancer (V76 12) (Z12 31)   31  Esophagitis, reflux (530 11) (K21 0)   32  External Hemorrhoids (455 3)   33  Failure of pancreas transplant (996 86) (T84 421)   34   Fatigue (035 79) (R53 83)   35  FELIPE (generalized anxiety disorder) (300 02) (F41 1)   36  H/O kidney transplant (V42 0) (Z94 0)   37  Heart palpitations (785 1) (R00 2)   38  Hospital discharge follow-up (V67 59) (Z09)   39  Hyperlipidemia (272 4) (E78 5)   40  Hyperplastic colon polyp (211 3) (K63 5)   41  Hypertension (401 9) (I10)   42  Hyponatremia (276 1) (E87 1)   43  Hypothyroidism (244 9) (E03 9)   44  Increased frequency of urination (788 41) (R35 0)   45  Increased white blood cell count (288 60) (D72 829)   46  Insomnia (780 52) (G47 00)   47  Irritable bowel syndrome (564 1) (K58 9)   48  Major depressive disorder, recurrent episode, in full remission (296 36) (F33 42)   49  Memory loss (780 93) (R41 3)   50  Memory loss or impairment (780 93) (R41 3)   51  Metabolic acidosis (336 0) (E87 2)   52  MGUS (monoclonal gammopathy of unknown significance) (273 1) (D47 2)   53  Need for influenza vaccination (V04 81) (Z23)   54  Nonrheumatic aortic valve insufficiency (424 1) (I35 1)   55  OAB (overactive bladder) (596 51) (N32 81)   56  Osteopenia (733 90) (M85 80)   57  Osteoporosis (733 00) (M81 0)   58  Other calculus in bladder (594 1) (N21 0)   59  Peripheral vascular disease (443 9) (I73 9)   60  Post traumatic stress disorder (309 81) (F43 10)   61  Preop general physical exam (V72 83) (Z01 818)   62  Preoperative cardiovascular examination (V72 81) (Z01 810)   63  Proteinuria (791 0) (R80 9)   64  Pyelonephritis (590 80) (N12)   65  Rectal polyp (569 0) (K62 1)   66  Recurrent UTI (599 0) (N39 0)   67  Restless legs syndrome (333 94) (G25 81)   68  Secondary hyperparathyroidism, renal (588 81) (N25 81)   69  Snoring (786 09) (R06 83)   70  SOB (shortness of breath) (786 05) (R06 02)   71  Status post amputation of right great toe (V49 71) (Z89 411)   72  Status post pancreas transplantation (V42 83) (Z94 83)   73  Status post transmetatarsal amputation of left foot (V49 73) (Z89 432)   74  Tremor (781 0) (R25 1)   75  Unsteady gait (781 2) (R26 81)   76  Weakness (780 79) (R53 1)    Past Medical History    · History of Abnormal Liver Function Test (790 6)   · History of Abnormal urine (791 9) (R82 90)   · History of Abscess of buttock, right (682 5) (L02 31)   · History of Acute kidney injury (584 9) (N17 9)   · History of Acute on chronic diastolic congestive heart failure (428 33,428 0) (I50 33)   · History of Bilateral impacted cerumen (380 4) (H61 23)   · History of Cervical Dysplasia (V13 22)   · History of Denial Of Any Significant Medical History   · History of Diabetes mellitus with foot ulcer (250 80,707 15) (E11 621,L97 509)   · History of allergic urticaria (V13 3) (Z87 2)   · History of cholelithiasis (V12 79) (Z87 19)   · History of dysuria (V13 00) (R88 184)   · History of encephalopathy (V12 49) (Z86 69)   · History of gastritis (V12 79) (Z87 19)   · Denied: History of head injury   · History of hematuria (V13 09) (Z87 448)   · History of hyperkalemia (V12 29) (Z86 39)   · History of pneumonia (V12 61) (Z87 01)   · History of seborrhea (V13 3) (Z87 2)   · History of urinary tract infection (V13 02) (Z87 440)   · History of urinary tract infection (V13 02) (Z87 440)   · History of Iliotibial band syndrome (728 89) (M76 30)   · History of Infected tooth (522 4) (K04 7)   · History of Lumbar radiculopathy (724 4) (M54 16)   · History of Neck pain (723 1) (M54 2)   · History of Nonrheumatic aortic valve insufficiency (424 1) (I35 1)   · Denied: History of Seizures   · History of Sinus Tachycardia (427 89)   · History of Trochanteric bursitis, unspecified laterality (726 5) (M70 60)   · History of Urinary Tract Infection (V13 02)   · History of UTI (urinary tract infection), bacterial (599 0,041 9) (N39 0,A49  9)   · History of Vaginal itching (698 1) (L29 8)    The active problems and past medical history were reviewed and updated today        Surgical History    · History of Cataract Surgery   · History of Cholecystectomy   · History of Complete Colonoscopy   · History of Complete Colonoscopy   · History of Diagnostic Esophagogastroduodenoscopy   · History of Diagnostic Esophagogastroduodenoscopy   · History of Dilation And Curettage   · History of Foot Surgery   · History of Pancreatic Transplantation   · Renal Transplant   · History of Surgery Left Foot Amputation    The surgical history was reviewed and updated today  Family History    · No family history of mental disorder    · Family history of cancer (V16 9) (Z80 9)   · No family history of mental disorder    · Family history of depression (V17 0) (Z81 8)    · Family history of cancer (V16 9) (Z80 9)    · Family history of Breast Cancer (V16 3)    The family history was reviewed and updated today  Social History    · Denied: History of Alcohol Use (History)   · Former smoker (V15 82) (E54 374)   · quit 1996   · History of Uses cocaine  The social history was reviewed and updated today  Current Meds   1  Acetaminophen 325 MG Oral Tablet; TAKE 1 TO 2 TABLETS EVERY 6 HOURS AS   NEEDED Recorded   2  AmLODIPine Besylate 10 MG Oral Tablet; TAKE 1 TABLET BY MOUTH EVERY MORNING   AND 1/2 TABLET BY MOUTH EVERY EVENING; Therapy: 67CVL1680 to (Christo Su)  Requested for: 11Aug2017; Last   Rx:11Aug2017 Ordered   3  ARIPiprazole 20 MG Oral Tablet; Therapy: 05OVK4963 to  Requested for: 08Aug2017 Recorded   4  Aspirin 81 MG TABS; TAKE 1 TABLET DAILY; Therapy: 04TYC7172 to (Evaluate:19Jun2016) Recorded   5  BD Pen Needle Mary U/F 32G X 4 MM Miscellaneous; Use 4x daily; Therapy: 98OVB5138 to (Evaluate:11Aug2018)  Requested for: 41LIR1373; Last   Rx:10Sgg0146 Ordered   6  Catapres 0 1 MG Oral Tablet; take 3 tab  in am, 2 tab at noon, 3 tab  at night; Therapy: 06LFE0196 to (Last Rx:29Nsy5441)  Requested for: 15Thh0096 Ordered   7  Clindamycin HCl - 300 MG Oral Capsule; take 2 caps  1 hr  prior to dental procedure;    Therapy: 05GVB0016 to (Last Rx: 03SJL8795)  Requested for: 56RBD2259 Ordered   8  Doxazosin Mesylate 1 MG Oral Tablet; TAKE 1 TABLET Bedtime  Requested for:   81KGP8609; Last Rx:05Apr2017 Ordered   9  DULoxetine HCl - 60 MG Oral Capsule Delayed Release Particles; TAKE 1 CAPSULE   TWICE DAILY; Therapy: 19TYB6622 to (Evaluate:96Zqa0918)  Requested for: 57VAO2631; Last   Rx:02Mar2017 Ordered   10  Folic Acid 1 MG Oral Tablet; TAKE 1 TABLET DAILY AS DIRECTED; Therapy: 10LPA7642 to (Evaluate:07Oct2017)  Requested for: 20FLV7160; Last    Rx:12Oct2016 Ordered   11  Furosemide 20 MG Oral Tablet; TAKE 1 TABLET DAILY; Therapy: 59Brf3213 to (Evaluate:44Lvm6626) Recorded   12  HumaLOG KwikPen 100 UNIT/ML Subcutaneous Solution Pen-injector; Inject 5 units 3    times daily with meals; Therapy: 57RNK3708 to (Evaluate:56Ahm5027)  Requested for: 29Aug2017 Recorded   13  HydrALAZINE HCl - 50 MG Oral Tablet; TAKE 1 TABLET 3 TIMES DAILY; Therapy: 23QLL2889 to (Marcelo Resendiz)  Requested for: 47SVV9992; Last    Rx:13Mar2017 Ordered   14  Lactulose 10 GM/15ML Oral Solution; TAKE 30 ML DAILY; Therapy: 82KBY5937 to (Last Rx:02Jun2017)  Requested for: 02Jun2017 Ordered   15  Levothyroxine Sodium 88 MCG Oral Tablet; take 1 tablet by mouth daily; Therapy: 78MGK6000 to (Evaluate:55Gdb8102)  Requested for: 88Vkv3347 Recorded   16  LORazepam 2 MG Oral Tablet; Take 1 tablet 2 times daily as needed; Therapy: 61XRK3072 to (Evaluate:61Tgq7006)  Requested for: 50UCK0654 Recorded   17  Metoprolol Tartrate 50 MG Oral Tablet; take 1 tablet by mouth twice daily; Therapy: 47OGH6192 to (Evaluate:31Blp2977)  Requested for: 27Jun2017; Last    WN:86NJB2923 Ordered   18  OneTouch Ultra Blue In [employer]; test twice daily; Therapy: 27XRU5280 to (Evaluate:77Lfj2179)  Requested for: 07ZMK6797; Last    Rx:11Aug2017 Ordered   19  OneTouch UltraSoft Lancets Miscellaneous; test twice daily;     Therapy: 06MAH8936 to (Evaluate:88Oss4744)  Requested for: 01EWK3277 Recorded   20  Pravastatin Sodium 80 MG Oral Tablet; take 1 tablet by mouth every day; Therapy: 83ECS9842 to (Evaluate:87Mce0375)  Requested for: 61JRP5919; Last    Rx:22Jan2017 Ordered   21  PredniSONE 5 MG Oral Tablet; TAKE 1 1/2 TABLETS BY MOUTH EVERY DAY; Therapy: 28HSN3300 to (Evaluate:99Vkk6880)  Requested for: 45ZGX7612; Last    Rx:06Jun2017 Ordered   22  ROPINIRole HCl - 0 25 MG Oral Tablet; take 1 tablet by mouth twice daily; Therapy: 15NQV9792 to (Elias Griffiths)  Requested for: 61Whe5903; Last    Rx:30Awl8146 Ordered   23  Sertraline HCl - 100 MG Oral Tablet; TAKE 2 TABLET BY MOUTH DAILY; Therapy: 56ODR0360 to (Matt Yoder)  Requested for: 58FSL6171; Last    Rx:96Wfc7179 Ordered   24  Sodium Bicarbonate 650 MG Oral Tablet; Take one tablet daily; Therapy: 89OUL3999 to (Evaluate:12Apr2016)  Requested for: 14JFD6885 Recorded   25  Tacrolimus 1 MG Oral Capsule; Take 4 in the morning and 3 in the evening  Requested    for: 46Omq2573; Last Rx:64Xse8507 Ordered   26  Toujeo SoloStar 300 UNIT/ML Subcutaneous Solution Pen-injector; INJECT 20 UNIT    Bedtime; Therapy: 32RBC9653 to (Evaluate:18Nuu2058)  Requested for: 62Kne7522 Recorded   27  TraZODone HCl - 150 MG Oral Tablet; TAKE 1 TABLET AT BEDTIME; Therapy: 08KBS9836 to (Elias Griffiths)  Requested for: 65Eei4135; Last    Rx:07Aug2017 Ordered   28  Vitamin D3 1000 UNIT Oral Capsule; TAKE 3 PILLS AT NOON BY MOUTH; Therapy: (Recorded:17Jun2016) to Recorded    The medication list was reviewed and updated today  Allergies    1  Penicillins   2  Morphine Derivatives   3  Duricef TABS   4   Myrbetriq TB24    Vitals   Recorded: 30Aug2017 02:44PM   Temperature 99 1 F   Heart Rate 87   Respiration 16   Systolic 539   Diastolic 62   Height 5 ft 7 5 in   Weight 219 lb 4 0 oz   BMI Calculated 33 83   BSA Calculated 2 11   O2 Saturation 96     Physical Exam    Constitutional   General appearance: No acute distress, well appearing and well nourished  Eyes   Conjunctiva and lids: No swelling, erythema or discharge  Pupils and irises: Equal, round and reactive to light  Ears, Nose, Mouth, and Throat   External inspection of ears and nose: Normal     Oropharynx: Normal with no erythema, edema, exudate or lesions  Pulmonary   Auscultation of lungs: Clear to auscultation  Cardiovascular   Auscultation of heart: Normal rate and rhythm, normal S1 and S2, without murmurs  Examination of extremities for edema and/or varicosities: Normal     Carotid pulses: Normal     Abdomen   Abdomen: Non-tender, no masses  Liver and spleen: No hepatomegaly or splenomegaly  Lymphatic   Palpation of lymph nodes in neck: No lymphadenopathy  Musculoskeletal   Gait and station: Normal     Digits and nails: Normal without clubbing or cyanosis  Inspection/palpation of joints, bones, and muscles: Normal     Skin   Skin and subcutaneous tissue: Normal without rashes or lesions  Neurologic   Cranial nerves: Cranial nerves 2-12 intact      Psychiatric   Orientation to person, place, and time: Normal     Mood and affect: Normal         ECOG 1       Results/Data  (1) URINALYSIS (will reflex a microscopy if leukocytes, occult blood, protein or nitrites are not within normal limits) 86Wzp3896 07:26AM [facility]    Order Number: [id number]     Test Name Result Flag Reference   COLOR Yellow     CLARITY Cloudy     SPECIFIC GRAVITY UA 1 011  1 003-1 030   PH UA 7 0  4 5-8 0   LEUKOCYTE ESTERASE UA Large A Negative   NITRITE UA Positive A Negative   PROTEIN UA 30 (1+) mg/dl A Negative   GLUCOSE UA Negative mg/dl  Negative   KETONES UA Negative mg/dl  Negative   UROBILINOGEN UA 0 2 E U /dl  0 2, 1 0 E U /dl   BILIRUBIN UA Negative  Negative   BLOOD UA Negative  Negative   BACTERIA Innumerable /hpf A None Seen, Occasional   EPITHELIAL CELLS None Seen /hpf  None Seen, Occasional   HYALINE CASTS None Seen /lpf  None Seen   RBC UA None Seen /hpf  None Seen   WBC UA 30-50 /hpf A None Seen     (1) CBC/ PLT (NO DIFF) 21Bhl7495 06:31AM [facility]     Test Name Result Flag Reference   HEMATOCRIT 35 6 %  34 8-46 1   HEMOGLOBIN 11 4 g/dL L 11 5-15 4   MCHC 32 0 g/dL  31 4-37 4   MCH 30 1 pg  26 8-34 3   MCV 94 fL  82-98   PLATELET COUNT 968 Thousands/uL  149-390   RBC COUNT 3 79 Million/uL L 3 81-5 12   RDW 18 1 % H 11 6-15 1   WBC COUNT 8 52 Thousand/uL  4 31-10 16   MPV 13 1 fL H 8 9-12 7     (1)  TACROLIMUS 22Aug2017 06:31AM [facility]     Test Name Result Flag Reference    5 1 ng/mL  2 0 - 20 0   Trough (immediately following                  transplant)                       15 0          Trough (steady state, 2 weeks or                  more after transplant):      3 0 - 8 0                                   Detection Limit = 1 0          Performed by LC-MS/MS technology  Performed at:  [place] [id number]  : Lana Edgar MD, Phone:  [phone]     (1) IMMUNOFIXATION, URINE 69VHJ2558 10:34AM [facility]     Test Name Result Flag Reference   IMMUNOFIXATION INTERPRETATION      Urine immunofixation shows a monoclonal gammopathy identified as IgG kappa (4 31 mg/dL)  Reviewed by: Qiana Contreras MD ([id number]) **Electronic Signature**     (1) IMMUNOFIXATION, SERUM 00Saz4147 10:31AM [facility]     Test Name Result Flag Reference   IMMUNOFIXATION INTERPRETATION      Serum immunofixation shows a biclonal gammopathy identified as IgG kappa ( M-Peak 1 = 0 54 g/dL and M-Peak 2 = 2 27 g/dL)  Reviewed by: Qiana Contreras MD ([id number]) **Electronic Signature**     (1) PROTEIN ELECTRO, SERUM 23Cqa5372 06:38AM [facility]   [facility] Order Number: [id number]     Test Name Result Flag Reference   A/G RATIO 0 73 L 1 10-1 80   Albumin 42 2 % L 52 0-65 0   Albumin Conc  3 42 g/dl L 3 50-5 00   Alpha 1 Conc   0 33 g/dL  0 10-0 40   ALPHA 1 4 1 %  2 5-5 0 Alpha 2 Conc  0 87 g/dL  0 40-1 20   ALPHA 2 10 7 %  7 0-13 0   Beta 1 Conc  0 38 g/dL L 0 40-0 80   BETA-1 4 7 % L 5 0-13 0   Beta 2 Conc 0 72 g/dL H 0 20-0 50   BETA-2 8 9 % H 2 0-8 0   Gamma Conc 3 10 g/dL H 0 50-1 60   GAMMA GLOBULIN 38 3 % H 12 0-22 0   Interpretation      The serum total protein is within normal limits with hypoalbuminemia  The serum protein electrophoresis shows a decreased beta-1 region  The serum protein electrophoresis shows an increased beta-2 region  The serum protein electrophoresis shows   This is a corrected result  Previous result was The serum total protein is within normal limits with hypoalbuminemia  The serum protein electrophoresis shows a decreased beta-1 region  The serum protein electrophoresis shows hypergammaglobulinemia with a   biclonal gammopathy  Immunofixation to be performed  Reviewed by: Qiana Contreras MD ([id number]) **Electronic Signature** on 8/12/2017 at 0855 EDT   M-Peak 1 Conc  0 54 g/dL     M PEAK ID 1 6 70 %     M-Peak 2 Conc  2 27 g/dL     M PEAK ID 2 28 00 %     TOTAL PROTEIN  8 1 g/dL  6 4-8 2   Hypergammaglobulinemia and a biclonal gammopathy  Immunofixation to be performed  Reviewed by: Qiana Contreras MD     (1) FREE LIGHT CHAINS, SERUM 32Iwt9452 06:38AM [facility] Order Number: [id number]     Test Name Result Flag Reference   FREE KAPPA LIGHT CHAINS, SERUM 49 1 mg/L H 3 3 - 19 4   FREE LAMBDA LIGHT CHAINS, SERUM 18 4 mg/L  5 7 - 26 3   KAPPA/LAMBDA RATIO, SERUM 2 67 H 0 26 - 1 65   Performed at:  [facility] [employer] [address]  [id number]  : Siobhan Sutton MD, Phone:  [phone]     (1) PROTEIN ELECTRO, URINE 28SOC2860 06:38AM [employer]     Test Name Result Flag Reference   ALPHA 1 URINE 17 8 %     ALPHA 2 URINE 21 4 %     BETA URINE 18 6 %     GAMMA GLOBULIN URINE 9 5 %     M PEAK ID 1 URINE 7 3 %     UPEP INTERPRETATION      The urine total protein is increased   The urine protein electrophoresis shows a faint possible band  Immunofixation to be performed  Reviewed by: Allean Lesches Tellschow, MD ([id number]) **Electronic Signature**   ALBUMIN URINE ELP 32 7 %     M PEAK 1 CONCENTRATION URINE 4 31 mg/dL     URINE PROTEIN 59 0 mg/dL H 2 0-17 5     Assessment    1  MGUS (monoclonal gammopathy of unknown significance) (273 1) (D47 2)   · per Dr Jonathan Tejeda   2  Diabetic polyneuropathy (250 60,357 2) (E11 42)    Plan  MGUS (monoclonal gammopathy of unknown significance)    · * XR BONE SURVEY COMPLETE; Status:Active; Requested for:26Tby7290;    Perform:[facility]; Due:23Wfl1683; Ordered; For:MGUS (monoclonal gammopathy of unknown significance); Ordered By:Eusebia Barragan [facility]);   · *1- [facility] Co-Management  Bone marrow biopsy and  aspirate under sedation  Status: Active  Requested for: 22UVC8240 07:00AM   Ordered; For: MGUS (monoclonal gammopathy of unknown significance); Ordered By: Cristin Jones) Performed:  Due: 34IZG9547; Last Updated By: Angie Crow; 8/30/2017 3:03:10 PM  Care Summary provided  : Yes   · Follow-up visit in 1 month Evaluation and Treatment  Follow-up  Status: Complete  Done:  29ACR8911   Ordered; For: MGUS (monoclonal gammopathy of unknown significance); Ordered By: Cristin Jones) Performed:  Due: 78IRL6558; Last Updated By: Angie Crow; 8/30/2017 4:53:07 PM    Discussion/Summary    #1  Diabetes mellitus type 1, status post kidney and pancreatic transplant in 1998 at [facility], now with exacerbation of diabetes mellitus type 1 she is insulin-dependent, was noticed to have abnormal monoclonal protein IgG Kappa with first peak of 0 54 g/dL and the second peak of 2 27 g/dL in August 2017, urine protein electrophoresis showed Kappa light chain  #2   Rule out MGUS versus multiple myeloma versus amyloidosis versus lymphoproliferative disorder secondary to chronic immunosuppression from  and prednisone, patient to have bone marrow biopsy and her general sedation she declined bone marrow biopsy in the office  #3  Bony skeleton survey is ordered and follow-up in one month I will keep you updated  Signatures   Electronically signed by :  EDGAR Gracia ; Aug 30 2017  5:14PM EST                       (Author)

## 2018-01-15 NOTE — RESULT NOTES
Verified Results  (1) OCCULT BLOOD, FECAL IMMUNOCHEMICAL TEST 98ZRO3428 08:47AM [facility] Garciana     Test Name Result Flag Reference   OCCULT BLD, FECAL IMMUNOLOGICAL Positive A Negative   Performed by Fecal Immunochemical Test      (1) FERRITIN 12Jun2017 06:32AM Zoya Sarna     Test Name Result Flag Reference   FERRITIN 228 ng/mL  8-388     (1) IRON SATURATION %, TIBC 79YFP6110 06:32AM Zoya Sarna     Test Name Result Flag Reference   IRON SATURATION 15 %     TOTAL IRON BINDING CAPACITY 242 ug/dL L 250-450   IRON 37 ug/dL L    Patients treated with metal-binding drugs (ie  Deferoxamine) may have depressed iron values       (1) CBC/ PLT (NO DIFF) 50BDE5285 06:32AM Zoya Sarna     Test Name Result Flag Reference   HEMATOCRIT 33 0 % L 34 8-46 1   HEMOGLOBIN 10 4 g/dL L 11 5-15 4   MCHC 31 5 g/dL  31 4-37 4   MCH 28 0 pg  26 8-34 3   MCV 89 fL  82-98   PLATELET COUNT 123 Thousands/uL  149-390   RBC COUNT 3 72 Million/uL L 3 81-5 12   RDW 17 4 % H 11 6-15 1   WBC COUNT 7 02 Thousand/uL  4 31-10 16

## 2018-01-15 NOTE — MISCELLANEOUS
History of Present Illness  Re: discussion I had w [facility] dentist, Christina Lynn, today  I would advocate having her dental procedures performed in hospital given her extensive comorbidities, including renal transplantation  I would also run any prophylaxis by Dr Jennifer Goode of infectious disease  Moniquelivier Renteria was recently admitted at the end of July for a UTI and was seen by Dr Nhi Teresa at that time  She has had recurrent UTI's of late          Signatures   Electronically signed by : EDGAR Carias ; Aug  3 2016  1:12PM EST                       (Author)

## 2018-01-15 NOTE — MISCELLANEOUS
Message  The patient called today and states that her weight has increased 5 lbs over the last few days, and she has increased edema in her ankles  She states that her legs, face, and lips are "puffy" as well  Per Edwar Britney, patient should take an extra 40 mg of Lasix tonight and come in for an appt with Shama Fraga tomorrow  Patient is aware of the above  She states that she is unable to come in tomorrow, however, she will come in on Wednesday  maulik      Active Problems    1  Abnormal EKG (794 31) (R94 31)   2  Acute on chronic diastolic congestive heart failure (428 33,428 0) (I50 33)   3  Anemia (285 9) (D64 9)   4  Atrial fibrillation (427 31) (I48 91)   5  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)   6  Benign hypertensive CKD (403 10) (I12 9)   7  Bilateral leg edema (782 3) (R60 0)   8  Cataract (366 9) (H26 9)   9  Chronic kidney disease, stage 3 (585 3) (N18 3)   10  Cocaine abuse in remission (305 63) (F14 10)   11  Cognitive changes (799 59) (R41 89)   12  Constipation (564 00) (K59 00)   13  Diabetic Gastropathy (537 9)   14  Diabetic Nephropathy (583 81)   15  Diabetic polyneuropathy (250 60,357 2) (E11 42)   16  Diabetic retinopathy (250 50,362 01) (E11 319)   17  Diabetic Ulcer Of The Left Foot (250 80)   18  ROD (dyspnea on exertion) (786 09) (R06 09)   19  Drug-induced Essential Tremor (333 1)   20  Encounter for screening mammogram for breast cancer (V76 12) (Z12 31)   21  Esophageal reflux (530 81) (K21 9)   22  Esophagitis, reflux (530 11) (K21 0)   23  External Hemorrhoids (455 3)   24  FELIPE (generalized anxiety disorder) (300 02) (F41 1)   25  H/O kidney transplant (V42 0) (Z94 0)   26  Heart palpitations (785 1) (R00 2)   27  Hyperlipidemia (272 4) (E78 5)   28  Hyperplastic colon polyp (211 3) (K63 5)   29  Hypertension (401 9) (I10)   30  Hypothyroidism (244 9) (E03 9)   31  Increased white blood cell count (288 60) (D72 829)   32  Irritable bowel syndrome (564 1) (K58 9)   33   Major depressive disorder, recurrent episode, 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) (F33 41)   34  Memory loss (780 93) (R41 3)   35  Metabolic acidosis (432 9) (E87 2)   36  MGUS (monoclonal gammopathy of unknown significance) (273 1) (D47 2)   37  Neck pain (723 1) (M54 2)   38  Nonrheumatic aortic valve insufficiency (424 1) (I35 1)   39  Osteopenia (733 90) (M85 80)   40  Osteoporosis (733 00) (M81 0)   41  Peripheral vascular disease (443 9) (I73 9)   42  Post traumatic stress disorder (309 81) (F43 10)   43  Proteinuria (791 0) (R80 9)   44  Rectal polyp (569 0) (K62 1)   45  Restless legs syndrome (333 94) (G25 81)   46  Secondary hyperparathyroidism, renal (588 81) (N25 81)   47  Sinus Tachycardia (427 89)   48  SOB (shortness of breath) (786 05) (R06 02)   49  Tremor (781 0) (R25 1)   50  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 (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 58) (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 38)    Current Meds   1  Abilify 20 MG Oral Tablet (ARIPiprazole); TAKE 1 TABLET AT BEDTIME; Therapy: 84WLG8776 to (Paxton Poole)  Requested for: 87Clo3977; Last   Rx:10Ink4068 Ordered   2  AmLODIPine Besylate 10 MG Oral Tablet; take 1/2 tab  BID; Therapy: 16QBE2435 to (Evaluate:85Oan4600)  Requested for: 61Vca1312 Recorded   3  Aspirin 81 MG Oral Tablet; TAKE 1 TABLET DAILY; Therapy: 25FTF6451 to (Evaluate:19Jun2016) Recorded   4  Carafate 1 GM/10ML Oral Suspension; Therapy: (Maddy [place]) to Recorded   5  Citracal TABS; Take one tablet twice daily Recorded   6  Clindamycin HCl - 300 MG Oral Capsule; TAKE 2 CAPSULES 1 HOUR PRIOR TO   DENTAL APPOINTMENT; Therapy: 13Ftz9611 to (Evaluate:21Tvm0439)  Requested for: 24ATP9964; Last   JN:48HNS1611 Ordered   7  CloNIDine HCl - 0 1 MG Oral Tablet; Take three tablets every morning, two tablets every   noon, and three tablets every evening; Therapy: 55XON7169 to (Evaluate:47Idd2966)  Requested for: 20Mar2015; Last   Rx:20Mar2015 Ordered   8   DULoxetine HCl - 60 MG Oral Capsule Delayed Release Particles (Cymbalta); TAKE 1   CAPSULE TWICE DAILY; Therapy: 54LYF2120 to (Nadeen Lax)  Requested for: 03SMJ3907; Last   Rx:50Usp6528; Status: ACTIVE - Renewal Denied Ordered   9  Folic Acid 1 MG Oral Tablet; TAKE 1 TABLET DAILY AS DIRECTED; Therapy: 19HTS1486 to (Evaluate:52Dnz7446)  Requested for: 77SJE5815; Last   Rx:08Dsl8259 Ordered   10  Furosemide 40 MG Oral Tablet; Take 1 tablet twice daily; Therapy: 04UTJ2684 to (Evaluate:11Jun2016)  Requested for: 18WXN3797 Recorded   11  Generlac 10 GM/15ML SYRP Recorded   12  HydrALAZINE HCl - 50 MG Oral Tablet; TAKE 1 TABLET 3 TIMES DAILY; Therapy: 49UOU4105 to (Evaluate:16Jan2017)  Requested for: 25PIH6739 Recorded   13  Hydrocodone-Acetaminophen 5-325 MG Oral Tablet; TAKE 1 TABLET EVERY 6 HOURS    AS NEEDED FOR PAIN;    Therapy: 79PJS3966 to (Evaluate:07Jan2016); Last Rx:64Xly9228 Ordered   14  Levothyroxine Sodium 75 MCG Oral Tablet; take 1 tablet by mouth every day; Therapy: 63EON5374 to (Evaluate:67Dwn1341)  Requested for: 41JOT7127; Last    Rx:89Bgr9614 Ordered   15  LORazepam 2 MG Oral Tablet; TAKE 1 TABLET TWICE DAILY AS NEEDED; Therapy: 16VWN5431 to (Evaluate:19Jun2016) Recorded   16  Magnesium  (64 Mg) MG TBCR; TAKE 1 TABLET DAILY; Therapy: 09BOD4689 to (Evaluate:91Jof7295); Last Rx:05Jun2013 Ordered   17  Metoprolol Tartrate 50 MG Oral Tablet; TAKE 2 TABLETS DAILY; Therapy: 70BRX8014 to (Evaluate:36Pnz5399)  Requested for: 56Nea2761 Recorded   18  OneTouch Ultra Blue In Vitro Strip; TEST EVERY DAY AND AS NEEDED; Therapy: 41OVE2636 to (Evaluate:19Apr2016)  Requested for: 26Tqz6254; Last    Rx:24Uml2106 Ordered   19  Pantoprazole Sodium 40 MG Oral Tablet Delayed Release; TAKE 1 TABLET DAILY; Therapy: 83CGY1222 to (Evaluate:02Apr2016)  Requested for: 35JAJ0975; Last    Rx:05Oct2015 Ordered   20  Pravastatin Sodium 80 MG Oral Tablet; take 1 tablet by mouth every day;     Therapy: 04XSM5896 to (Evaluate:08Zim0765)  Requested for: 79OIW3450; Last    Rx:19Apr2015 Ordered   21  PredniSONE 5 MG Oral Tablet; Take 1-1/2 tablets daily; Therapy: 02ZCN1641 to (Evaluate:14Mar2016)  Requested for: 20Mar2015; Last    Rx:20Mar2015 Ordered   22  ROPINIRole HCl - 0 25 MG Oral Tablet; take 1 tablet by mouth twice a day; Therapy: 10MQR0320 to ([facility])  Requested for: 79Xdk0302; Last    Rx:98Yng4520 Ordered   23  Sertraline HCl - 100 MG Oral Tablet; TAKE 2 TABLETS DAILY; Therapy: 87NVD3476 to ([facility])  Requested for: 35WRQ4816; Last    Rx:56Ufb4649; Status: ACTIVE - Renewal Denied Ordered   24  Sodium Bicarbonate 650 MG Oral Tablet; TAKE ONE TABLET BY MOUTH 3 TIMES    DAILY; Therapy: 68QEJ9397 to (Evaluate:12Apr2016)  Requested for: 49YBY1992 Recorded   25  Tacrolimus 1 MG Oral Capsule; TAKE 4 CAPSULES every morning and 3 capsules every    evening; Therapy: 29AXH6735 to ([phone])  Requested for: 39ZDO8584; Last    Rx:15Oct2015 Ordered   26  TraZODone HCl - 150 MG Oral Tablet; TAKE 1 TABLET AT BEDTIME; Therapy: 25JCK8881 to ([facility])  Requested for: 04QDL2285; Last    Rx:26Gth3464; Status: ACTIVE - Renewal Denied Ordered   27  Vitamin D3 3000 UNIT Oral Tablet; 1 TAB DAILY; Therapy: 43OQC8484 to (Evaluate:65Fkt1458) Recorded    Allergies    1  Penicillins   2  Morphine Derivatives   3   33 [employer] TABS    Signatures   Electronically signed by : EDGAR Huitron ; Jan 26 2016  1:00PM EST

## 2018-01-15 NOTE — RESULT NOTES
Verified Results  (1) IMMUNOFIXATION, URINE 85Sxl9663 10:34AM Dillon Alonzo     Test Name Result Flag Reference   IMMUNOFIXATION INTERPRETATION      Urine immunofixation shows a monoclonal gammopathy identified as IgG kappa (4 31 mg/dL)  Reviewed by: Craige Sandifer Tellschow, MD ([id number]) **Electronic Signature**     (1) IMMUNOFIXATION, SERUM 66Kig5790 10:31AM Dillon Alonzo     Test Name Result Flag Reference   IMMUNOFIXATION INTERPRETATION      Serum immunofixation shows a biclonal gammopathy identified as IgG kappa ( M-Peak 1 = 0 54 g/dL and M-Peak 2 = 2 27 g/dL)  Reviewed by: Craige Sandifer Tellschow, MD ([id number]) **Electronic Signature**     (1) PROTEIN ELECTRO, SERUM 06Ero8386 06:38AM Dillon Alonzo   TW Order Number: [id number]     Test Name Result Flag Reference   A/G RATIO 0 73 L 1 10-1 80   Albumin 42 2 % L 52 0-65 0   Albumin Conc  3 42 g/dl L 3 50-5 00   Alpha 1 Conc  0 33 g/dL  0 10-0 40   ALPHA 1 4 1 %  2 5-5 0   Alpha 2 Conc  0 87 g/dL  0 40-1 20   ALPHA 2 10 7 %  7 0-13 0   Beta 1 Conc  0 38 g/dL L 0 40-0 80   BETA-1 4 7 % L 5 0-13 0   Beta 2 Conc 0 72 g/dL H 0 20-0 50   BETA-2 8 9 % H 2 0-8 0   Gamma Conc 3 10 g/dL H 0 50-1 60   GAMMA GLOBULIN 38 3 % H 12 0-22 0   Interpretation      The serum total protein is within normal limits with hypoalbuminemia  The serum protein electrophoresis shows a decreased beta-1 region  The serum protein electrophoresis shows an increased beta-2 region  The serum protein electrophoresis shows   This is a corrected result  Previous result was The serum total protein is within normal limits with hypoalbuminemia  The serum protein electrophoresis shows a decreased beta-1 region  The serum protein electrophoresis shows hypergammaglobulinemia with a   biclonal gammopathy  Immunofixation to be performed  Reviewed by: Craige Sandifer Tellschow, MD ([id number]) **Electronic Signature** on 8/12/2017 at 0855 EDT   M-Peak 1 Conc  0 54 g/dL     M PEAK ID 1 6 70 %     M-Peak 2 Conc  2 27 g/dL     M PEAK ID 2 28 00 %     TOTAL PROTEIN  8 1 g/dL  6 4-8 2   Hypergammaglobulinemia and a biclonal gammopathy  Immunofixation to be performed  Reviewed by: Mindy Contreras MD     (1) PROTEIN ELECTRO, URINE [id number] 06:38AM Josefine Members     Test Name Result Flag Reference   ALPHA 1 URINE 17 8 %     ALPHA 2 URINE 21 4 %     BETA URINE 18 6 %     GAMMA GLOBULIN URINE 9 5 %     M PEAK ID 1 URINE 7 3 %     UPEP INTERPRETATION      The urine total protein is increased  The urine protein electrophoresis shows a faint possible band  Immunofixation to be performed  Reviewed by: Mindy Contreras MD ([id number]) **Electronic Signature**   ALBUMIN URINE ELP 32 7 %     M PEAK 1 CONCENTRATION URINE 4 31 mg/dL     URINE PROTEIN 59 0 mg/dL H 2 0-17 5

## 2018-01-15 NOTE — MISCELLANEOUS
Message   Recorded as Task   Date: 12/06/2016 04:21 PM, Created By: Caro Casiano   Task Name: Miscellaneous   Assigned To: Denisse Martinez   Regarding Patient: Mery Howard, Status: In Progress   Charla Lombardi - 06 Dec 2016 4:21 PM     TASK CREATED  please let CIPRIANO know, no change in BP meds based on log she sent in   Denisse Martinez - 07 Dec 2016 4:12 PM     TASK IN PROGRESS   I did make CIPRIANO aware that based on her recent BP log there will be no changes to BP meds at this time  [facility] 12/7/2016      Active Problems    1  Abnormal EKG (794 31) (R94 31)   2  Acid reflux disease (530 81) (K21 9)   3  Allergic urticaria (708 0) (L50 0)   4  Anemia (285 9) (D64 9)   5  Antibiotic prophylaxis for dental procedure indicated due to prior joint replacement   (V58 62) (Z79 2)   6  Atrial fibrillation (427 31) (I48 91)   7  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)   8  Benign hypertensive CKD (403 10) (I12 9)   9  Bilateral leg edema (782 3) (R60 0)   10  Bruit of left carotid artery (785 9) (R09 89)   11  Cataract (366 9) (H26 9)   12  Chronic diastolic congestive heart failure (428 32,428 0) (I50 32)   13  Chronic kidney disease, stage 4 (severe) (585 4) (N18 4)   14  Cocaine abuse in remission (305 63) (F14 10)   15  Cognitive changes (799 59) (R41 89)   16  Constipation (564 00) (K59 00)   17  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, without long-term    current use of insulin (250 61) (E10 49)   18  Diabetes mellitus with nephropathy (250 40,583 81) (E11 21)   19  Diabetic Gastropathy (537 9)   20  Diabetic polyneuropathy (250 60,357 2) (E11 42)   21  Diabetic retinopathy (250 50,362 01) (E11 319)   22  Diabetic Ulcer Of The Left Foot (250 80)   23  Diastolic dysfunction (441 5) (I51 9)   24  ROD (dyspnea on exertion) (786 09) (R06 09)   25  Drug-induced Essential Tremor (333 1)   26  Dysuria (788 1) (R30 0)   27  Encephalopathy (348 30) (G93 40)   28   Encounter for screening mammogram for breast cancer (V76 12) (Z12 31)   29  Esophagitis, reflux (530 11) (K21 0)   30  External Hemorrhoids (455 3)   31  FELIPE (generalized anxiety disorder) (300 02) (F41 1)   32  H/O kidney transplant (V42 0) (Z94 0)   33  Heart palpitations (785 1) (R00 2)   34  Hospital discharge follow-up (V67 59) (Z09)   35  Hyperlipidemia (272 4) (E78 5)   36  Hyperplastic colon polyp (211 3) (K63 5)   37  Hypertension (401 9) (I10)   38  Hypothyroidism (244 9) (E03 9)   39  Increased frequency of urination (788 41) (R35 0)   40  Increased white blood cell count (288 60) (D72 829)   41  Insomnia (780 52) (G47 00)   42  Irritable bowel syndrome (564 1) (K58 9)   43  Major depressive disorder, recurrent, moderate (296 32) (F33 1)   44  Memory loss (780 93) (R41 3)   45  Memory loss or impairment (780 93) (R41 3)   46  Metabolic acidosis (275 4) (E87 2)   47  MGUS (monoclonal gammopathy of unknown significance) (273 1) (D47 2)   48  Neck pain (723 1) (M54 2)   49  Need for influenza vaccination (V04 81) (Z23)   50  Nonrheumatic aortic valve insufficiency (424 1) (I35 1)   51  OAB (overactive bladder) (596 51) (N32 81)   52  Osteopenia (733 90) (M85 80)   53  Osteoporosis (733 00) (M81 0)   54  Other calculus in bladder (594 1) (N21 0)   55  Peripheral vascular disease (443 9) (I73 9)   56  Post traumatic stress disorder (309 81) (F43 10)   57  Preop general physical exam (V72 83) (Z01 818)   58  Preoperative cardiovascular examination (V72 81) (Z01 810)   59  Proteinuria (791 0) (R80 9)   60  Rectal polyp (569 0) (K62 1)   61  Recurrent UTI (599 0) (N39 0)   62  Restless legs syndrome (333 94) (G25 81)   63  Secondary hyperparathyroidism, renal (588 81) (N25 81)   64  Sinus Tachycardia (427 89)   65  SOB (shortness of breath) (786 05) (R06 02)   66  Tremor (781 0) (R25 1)   67  Unsteady gait (781 2) (R26 81)    Current Meds   1  AmLODIPine Besylate 10 MG Oral Tablet; take 1/2 tab  BID;    Therapy: 44EOX7174 to (Evaluate:30Apr2017) Requested for: 28SBJ6066 Recorded   2  ARIPiprazole 30 MG Oral Tablet; TAKE 1 TABLET AT BEDTIME; Therapy: 45FOV6246 to (Evaluate:15May2017)  Requested for: 49CAT3438; Last   Rx:16Nov2016 Ordered   3  Aspirin 81 MG TABS; TAKE 1 TABLET DAILY; Therapy: 55HWR6815 to (Evaluate:19Jun2016) Recorded   4  Caltrate 600 TABS; TAKE 1 TABLET TWICE DAILY; Therapy: (Recorded:17Jun2016) to Recorded   5  Catapres 0 1 MG Oral Tablet (CloNIDine HCl); take 3 tab  in am, 2 tab at noon, 3 tab  at   night; Therapy: 26YOU2403 to  Requested for: 12ZMD8196 Recorded   6  Clindamycin HCl - 300 MG Oral Capsule; TAKE 2 CAPSULES 1 HOUR PRIOR TO   DENTAL APPOINTMENT; Therapy: 14QKF4937 to (Evaluate:21Oct2016)  Requested for: 79RIL9431; Last   Rx:20Oct2016 Ordered   7  Doxazosin Mesylate 1 MG Oral Tablet; TAKE 1 TABLET AT BEDTIME; Therapy: (Recorded:17Jun2016) to Recorded   8  DULoxetine HCl - 60 MG Oral Capsule Delayed Release Particles (Cymbalta); TAKE 1   CAPSULE TWICE DAILY; Therapy: 48RWJ5610 to (Evaluate:07Feb2017)  Requested for: 45NSD3076; Last   Rx:09Nov2016 Ordered   9  Folic Acid 1 MG Oral Tablet; TAKE 1 TABLET DAILY AS DIRECTED; Therapy: 54JHA8029 to (Evaluate:07Oct2017)  Requested for: 81KGQ8203; Last   Rx:12Oct2016 Ordered   10  Furosemide 40 MG Oral Tablet; TAKE 1 TO 2 TABLETS DAILY AS NEEDED FOR    EDEMA; Therapy: 59CJP9845 to (Evaluate:25Jun2017)  Requested for: 31XAO6336; Last    Rx:30Jun2016 Ordered   11  HydrALAZINE HCl - 50 MG Oral Tablet; TAKE 1 TABLET 3 TIMES DAILY; Therapy: 53BML5923 to (Evaluate:26Jan2017)  Requested for: 54ONB4657; Last    Rx:01Feb2016 Ordered   12  Lactulose 10 GM/15ML Oral Solution; TAKE 30 ML DAILY; Therapy: (Recorded:17Jun2016) to Recorded   13  Levothyroxine Sodium 88 MCG Oral Tablet; take 1 tablet by mouth daily; Therapy: 97RSE5283 to (Evaluate:15Mar2017)  Requested for: 72ZDM7515; Last    Rx:15Nov2016 Ordered   14   LORazepam 2 MG Oral Tablet; TAKE 1 TABLET 3 TIMES DAILY AS NEEDED; Therapy: 51EZD9156 to (Evaluate:72Ygo5245)  Requested for: 09PNQ1387; Last    Rx:16Nov2016 Ordered   15  Magnesium 200 MG Oral Tablet; Therapy: 89NDB6797 to Recorded   16  Magnesium TABS; Therapy: (Recorded:07Oct2016) to Recorded   17  Metoprolol Tartrate 50 MG Oral Tablet; take 1 tablet by mouth twice daily; Therapy: 49JON7660 to (Evaluate:09Jun2017)  Requested for: 71BSL9560; Last    Rx:14Jun2016 Ordered   18  OneTouch Ultra Blue In [employer]; TEST TWICE A DAY; Therapy: 21VQN1961 to (Evaluate:30Zbg6782)  Requested for: 53RGF3652; Last    Rx:10Mar2016 Ordered   19  OneTouch UltraSoft Lancets Miscellaneous; test twice daily; Therapy: 82KLN7072 to (Steve Hendricskon)  Requested for: 78IDP7227; Last    Rx:09Mar2016 Ordered   20  Pantoprazole Sodium 40 MG Oral Tablet Delayed Release; take 1 tablet by mouth every    day; Therapy: 73WUL4570 to (Evaluate:36Ajh6136)  Requested for: 95YTX5913; Last    Rx:08Wed9742 Ordered   21  Pravastatin Sodium 80 MG Oral Tablet; take 1 tablet by mouth every day; Therapy: 03IFZ7606 to (Donnelly Favre)  Requested for: 85NTR3061; Last    KATIE:09VUQ6196 Ordered   22  PredniSONE 5 MG Oral Tablet; TAKE ONE AND 1/2 TABLETS BY MOUTH DAILY; Therapy: 73NNE5927 to ([facility])  Requested for: 53BHV8083; Last    Rx:19Ldr9464 Ordered   23  ROPINIRole HCl - 0 25 MG Oral Tablet; take 1 tablet by mouth twice daily; Therapy: 39DDF9479 to (Evaluate:50Yju9061)  Requested for: 20LFB2304; Last    Rx:30Jun2016 Ordered   24  Sertraline HCl - 100 MG Oral Tablet; TAKE 2 TABLETS DAILY; Therapy: 47UHB4668 to (Evaluate:62Ljw9815)  Requested for: 92JPU1106; Last    Rx:16Nov2016 Ordered   25  Sodium Bicarbonate 650 MG Oral Tablet; Take one tablet daily; Therapy: 92GGX3392 to (Evaluate:12Apr2016)  Requested for: 02XXU8550 Recorded   26  Tacrolimus 1 MG Oral Capsule (Prograf);  Take 4 in the morning and 3 in the evening     Requested for: 61Yit7537; Last Rx:18Nov2016 Ordered   27  TraZODone HCl - 150 MG Oral Tablet; TAKE 1 TABLET AT BEDTIME; Therapy: 18DDY1085 to (Evaluate:63Qwu4369)  Requested for: 10DWT2482; Last    Rx:09Nov2016 Ordered   28  Vitamin D3 1000 UNIT Oral Capsule; TAKE 3 PILLS AT NOON BY MOUTH; Therapy: (Recorded:17Jun2016) to Recorded    Allergies    1  Penicillins   2  Morphine Derivatives   3  Duricef TABS   4   Myrbetriq OR80    Signatures   Electronically signed by : EDGAR Morocho ; Dec  8 2016  8:22AM EST

## 2018-01-16 NOTE — MISCELLANEOUS
Message  Jessica Hale called this morning to reschedule her stacey't which was cancelled due to her feeling unwell, however while making this appointment I did note her repeat labs which were followed by an iron infusion were still on the lower side  Pt still had c/o of feeling sickly, some of her sx included chills,fatigue, sweats, body aches, and abd pain  Jessica Hale did report to the ED on Friday 6/9 with these sx  Pt states that she was later released stating there was nothing wrong however looking at her u/a from that visit I did note large leukocytes, positive nitrates, 2+ protein, and trace blood  I did check to see if this urine was sent out for a culture which was not  Per Dr Piero Hre request pt has been advised to report back to the ED for further evaluation and treatment of possible urinary tract infection  [facility] 6/14/2017      Active Problems    1  Abnormal EKG (794 31) (R94 31)   2  Acid reflux disease (530 81) (K21 9)   3  Acute kidney injury superimposed on CKD (866 00,585 9) (N17 9,N18 9)   4  Allergic urticaria (708 0) (L50 0)   5  Anemia (285 9) (D64 9)   6  Antibiotic prophylaxis for dental procedure indicated due to prior joint replacement   (V58 62) (Z79 2)   7  Atrial fibrillation (427 31) (I48 91)   8  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)   9  Benign hypertensive CKD (403 10) (I12 9)   10  Bilateral carotid bruits (785 9) (R09 89)   11  Bilateral leg edema (782 3) (R60 0)   12  Bruit of left carotid artery (785 9) (R09 89)   13  Cataract (366 9) (H26 9)   14  Chronic constipation (564 00) (K59 09)   15  Chronic diastolic congestive heart failure (428 32,428 0) (I50 32)   16  Chronic kidney disease, stage 4 (severe) (585 4) (N18 4)   17  Cocaine abuse in remission (305 63) (F14 10)   18  Cognitive changes (799 59) (R41 89)   19  Colon cancer screening (V76 51) (Z12 11)   20  Constipation (564 00) (K59 00)   21   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, without long-term current use of insulin (250 61) (E10 49)   22  Diabetes mellitus with diabetic nephropathy (250 40,583 81) (E11 21)   23  Diabetic Gastropathy (537 9)   24  Diabetic polyneuropathy (250 60,357 2) (E11 42)   25  Diabetic retinopathy (250 50,362 01) (E11 319)   26  Diabetic Ulcer Of The Left Foot (250 80)   27  Diastolic dysfunction (421 9) (I51 9)   28  ROD (dyspnea on exertion) (786 09) (R06 09)   29  Drug-induced Essential Tremor (333 1)   30  Dysuria (788 1) (R30 0)   31  Encephalopathy (348 30) (G93 40)   32  Encounter for screening mammogram for breast cancer (V76 12) (Z12 31)   33  Esophagitis, reflux (530 11) (K21 0)   34  External Hemorrhoids (455 3)   35  Fatigue (780 79) (R53 83)   36  FELIPE (generalized anxiety disorder) (300 02) (F41 1)   37  H/O kidney transplant (V42 0) (Z94 0)   38  Heart palpitations (785 1) (R00 2)   39  Hospital discharge follow-up (V67 59) (Z09)   40  Hyperlipidemia (272 4) (E78 5)   41  Hyperplastic colon polyp (211 3) (K63 5)   42  Hypertension (401 9) (I10)   43  Hyponatremia (276 1) (E87 1)   44  Hypothyroidism (244 9) (E03 9)   45  Increased frequency of urination (788 41) (R35 0)   46  Increased white blood cell count (288 60) (D72 829)   47  Insomnia (780 52) (G47 00)   48  Irritable bowel syndrome (564 1) (K58 9)   49  Major depressive disorder, recurrent episode, in full remission (296 36) (F33 42)   50  Memory loss (780 93) (R41 3)   51  Memory loss or impairment (780 93) (R41 3)   52  Metabolic acidosis (266 7) (E87 2)   53  MGUS (monoclonal gammopathy of unknown significance) (273 1) (D47 2)   54  Need for influenza vaccination (V04 81) (Z23)   55  Nonrheumatic aortic valve insufficiency (424 1) (I35 1)   56  OAB (overactive bladder) (596 51) (N32 81)   57  Osteopenia (733 90) (M85 80)   58  Osteoporosis (733 00) (M81 0)   59  Other calculus in bladder (594 1) (N21 0)   60  Peripheral vascular disease (443 9) (I73 9)   61   Post traumatic stress disorder (309 81) (F43 10)   62  Preop general physical exam (V72 83) (Z01 818)   63  Preoperative cardiovascular examination (V72 81) (Z01 810)   64  Proteinuria (791 0) (R80 9)   65  Rectal polyp (569 0) (K62 1)   66  Recurrent UTI (599 0) (N39 0)   67  Restless legs syndrome (333 94) (G25 81)   68  Secondary hyperparathyroidism, renal (588 81) (N25 81)   69  Snoring (786 09) (R06 83)   70  SOB (shortness of breath) (786 05) (R06 02)   71  Tremor (781 0) (R25 1)   72  Unsteady gait (781 2) (R26 81)    Current Meds   1  AmLODIPine Besylate 10 MG Oral Tablet; take 1/2 tab  BID; Therapy: 04CTX6840 to (Last Rx:07Apr2017)  Requested for: 07Apr2017 Ordered   2  ARIPiprazole 30 MG Oral Tablet; Take 1 tablet by mouth at bedtime; Therapy: 11UFK1784 to (Evaluate:62Evb0348)  Requested for: 18SPS8912; Last   Rx:23May2017 Ordered   3  Aspirin 81 MG TABS; TAKE 1 TABLET DAILY; Therapy: 37GHB4683 to (Evaluate:19Jun2016) Recorded   4  Catapres 0 1 MG Oral Tablet; take 3 tab  in am, 2 tab at noon, 3 tab  at night; Therapy: 39KAN6790 to (Last Rx:22Mar2017)  Requested for: 23Mar2017 Ordered   5  Clindamycin HCl - 300 MG Oral Capsule; TAKE 2 CAPSULES 1 HOUR PRIOR TO   DENTAL APPOINTMENT; Therapy: 69AGA0601 to (Lavelle Haley)  Requested for: 13Apr2017; Last   Rx:07Apr2017 Ordered   6  Doxazosin Mesylate 1 MG Oral Tablet; TAKE 1 TABLET Bedtime  Requested for:   20Apr2017; Last Rx:05Apr2017 Ordered   7  DULoxetine HCl - 60 MG Oral Capsule Delayed Release Particles; TAKE 1 CAPSULE   TWICE DAILY; Therapy: 75VCG4188 to (Evaluate:84Gde8092)  Requested for: 02OGN0366; Last   Rx:02Mar2017 Ordered   8  Flonase Allergy Relief 50 MCG/ACT Nasal Suspension; USE 2 SPRAYS IN EACH   NOSTRIL ONCE DAILY; Therapy: 69TNY7657 to Recorded   9  Folic Acid 1 MG Oral Tablet; TAKE 1 TABLET DAILY AS DIRECTED; Therapy: 33EXF4090 to (Evaluate:07Oct2017)  Requested for: 57KXN8446; Last   Rx:12Oct2016 Ordered   10  Furosemide 20 MG Oral Tablet; take 1/2 tab  daily; Therapy: 20Vfl3694 to Recorded   11  Generlac 10 GM/15ML Oral Solution; TAKE 30 ML BY MOUTH daily; Therapy: 68SIN2279 to (23 082755)  Requested for: 83WOD6250; Last    Rx:02Jun2017 Ordered   12  HydrALAZINE HCl - 50 MG Oral Tablet; TAKE 1 TABLET 3 TIMES DAILY; Therapy: 83FUY2867 to (Judith Marks)  Requested for: 77OMG4248; Last    Rx:13Mar2017 Ordered   13  Lactulose 10 GM/15ML Oral Solution; TAKE 30 ML DAILY; Therapy: 26CDH6557 to (Last Rx:02Jun2017)  Requested for: 02Jun2017 Ordered   14  Levothyroxine Sodium 88 MCG Oral Tablet; take 1 tablet by mouth daily; Therapy: 19FBD4410 to (Evaluate:15Mar2017)  Requested for: 45FCA0936; Last    Rx:99Xik9754 Ordered   15  LORazepam 2 MG Oral Tablet; TAKE 1 TABLET 3 TIMES DAILY AS NEEDED; Therapy: 67MFV8294 to (Evaluate:15May2017)  Requested for: 57DNM2341; Last    Rx:16Nov2016 Ordered   16  Magnesium 200 MG Oral Tablet; Therapy: 74SUP4577 to Recorded   17  Metoprolol Tartrate 50 MG Oral Tablet; take 1 tablet by mouth twice daily; Therapy: 38AGA1921 to (Evaluate:09Jun2017)  Requested for: 04JFE5877; Last    Rx:14Jun2016 Ordered   18  OneTouch Ultra Blue In [employer]; test twice daily; Therapy: 87XJB9311 to (Evaluate:24Mar2018)  Requested for: 90OFW6191; Last    Rx:29Mar2017 Ordered   19  OneTouch UltraSoft Lancets Miscellaneous; test twice daily; Therapy: 72WMZ8091 to (Darletta Geeta)  Requested for: 01XGR5465; Last    Rx:09Mar2016 Ordered   20  Pravastatin Sodium 80 MG Oral Tablet; take 1 tablet by mouth every day; Therapy: 61HSF6048 to (Evaluate:33Knp4892)  Requested for: 32WLZ8881; Last    Rx:22Jan2017 Ordered   21  PredniSONE 5 MG Oral Tablet; TAKE 1 1/2 TABLETS BY MOUTH EVERY DAY; Therapy: 28KMW8652 to (Evaluate:01Igy2818)  Requested for: 40OFL0932; Last    Rx:06Jun2017 Ordered   22  ROPINIRole HCl - 0 25 MG Oral Tablet; take 1 tablet by mouth twice daily;     Therapy: 80CTQ1639 to (Evaluate:89Aev1006) Requested for: 98ASU3240; Last    Rx:30Jun2016 Ordered   23  Sertraline HCl - 100 MG Oral Tablet; TAKE 2 TABLETS DAILY; Therapy: 84WPK7525 to (Evaluate:20Icy7093)  Requested for: 72UOI5596; Last    Rx:16Nov2016 Ordered   24  Sodium Bicarbonate 650 MG Oral Tablet; Take one tablet daily; Therapy: 25SDY4231 to (Evaluate:12Apr2016)  Requested for: 50GEZ8572 Recorded   25  Tacrolimus 1 MG Oral Capsule; Take 4 in the morning and 4 in the evening  Requested    for: 70PLG0958; Last Rx:22May2017 Ordered   26  TraZODone HCl - 150 MG Oral Tablet; TAKE 1 TABLET AT BEDTIME; Therapy: 87JIG7323 to (Evaluate:17Wsx2370)  Requested for: 52UHH2261; Last    Rx:02Mar2017 Ordered   27  VESIcare 5 MG Oral Tablet; take one tablet daily; Therapy: 66Yty3062 to Recorded   28  Vitamin D3 1000 UNIT Oral Capsule; TAKE 3 PILLS AT NOON BY MOUTH; Therapy: (Recorded:17Jun2016) to Recorded    Allergies    1  Penicillins   2  Morphine Derivatives   3  Duricef TABS   4   Myrbetriq TO19    Signatures   Electronically signed by : EDGAR Addison ; Jun 16 2017 11:33AM EST                       (Author)

## 2018-01-16 NOTE — PROGRESS NOTES
Plan    1  DSMT/MNT Time Record; Status:Complete;   Done: 63URW3180 12:00AM    Discussion/Summary    PATIENT EDUCATION RECORD   Indication for Services: type 1 Diabetes Mellitus  She is ready to learn  Discussed Insulin Types  Method: Instruction  Taking Medication:   Discussed devices-syringe/pen  Method: Instruction  Response: [facility]   She uses Pen  Discussed administration of insulin using syringe/pen  Method: Demonstration Instruction  Response: Verbalizes Instruction and Returns Demonstration  Discussed site selection and rotation of injections  Method: Instruction  Response: [facility]  Discussed safe needle disposal  Method: Instruction  Response: [facility]  Discussed medication storage  Method: Instruction  Response: [facility]  Response: [facility]  She is taking   She is taking insulin Tresiba 25 units HS  She was given Fast 15 List   Problem Solving: She is on medications that cause hypoglycemia   Hypoglycemia: Stated definition and causes: Method: Instruction  Response: Verbalizes Understanding   Described signs and symptoms: Method: Instruction and Handout  Response: Verbalizes Understanding   Discussed prevention: Method: Instruction  Response: Verbalizes Instruction   Discussed treatment: Method: Instruction  Response: [employer] when to notify physician and when to call EMS: Method: Instruction  Response: Verbalizes Instruction      Chief Complaint  Patient with T1DM seen for insulin pen instruction per MD request       History of Present Illness  Patient and her mother were instructed on proper use of Tresiba insulin pen  Demonstrated how to use the insulin pen  Patient returned demonstration  Crystal understands to dose 25 units of Tresiba at bedtime nightly  Patient was instructed on how to treat hypoglycemia  Educator will remain available for further questions        Results/Data  DSMT/MNT Time Record 10Aug2017 12:00AM Vickie Bobo     Test Name Result Flag Reference   Date of Service 8/10/2017     Start - Stop Time 9:45-10:15AM     Total MInutes 30 minutes     Group Or Individual Instruction DSMT-I       Future Appointments    Date/Time Provider Specialty Site   10/06/2017 09:30 AM EDGAR Amin  [address] [place] [address] PRACTICE   09/26/2017 02:15 PM EDGAR Nick  Psychiatry [address] [facility]   09/29/2017 09:15 AM Rahul Corey, [address] [place] [address] ENDOCRINOLOGY     Signatures   Electronically signed by : Nataly Malik, [facility];  Aug 10 2017  3:17PM EST                       (Author)    Electronically signed by : EDGAR Loomis ; Aug 14 2017  8:18AM EST

## 2018-01-16 NOTE — MISCELLANEOUS
Message   Recorded as Task   Date: 11/08/2016 12:34 PM, Created By: Heidy Espino   Task Name: Follow Up   Assigned To: Kate Whitney   Regarding Patient: Mery Howard, Status: Active   Comment:    Loretta Pete - 08 Nov 2016 12:34 PM     TASK CREATED  Caller: Self; [phone] (Home); [phone] (Work)  ADRIANA HAD TO CANCEL HER APPT  TODAY AT 2:30  SHE IS HAVING ALOT OF HEALTH PROBLEMS  YOU CAN CALL HER SHE SAID IF YOU WANT TO SHE WILL TELL YOU WHAT IS GOING ON  [phone]   Spoke with patient - she has multiple medical issues: was hospitalized multiple times since last visit due to UTIs and bladder problems, also was in a nursing home due to difficulty walking  Could not get here yesterday for her appointment - rescheduled for next week    Plan: Abilify, Cymbalta and Trazodone Rx renewed (patient would run out before next appointment)      Plan  FELIPE (generalized anxiety disorder), Major depressive disorder, recurrent episode, in full  remission    · DULoxetine HCl - 60 MG Oral Capsule Delayed Release Particles (Cymbalta);  TAKE 1 CAPSULE TWICE DAILY  Insomnia    · TraZODone HCl - 150 MG Oral Tablet; TAKE 1 TABLET AT BEDTIME  Major depressive disorder, recurrent episode, in full remission    · ARIPiprazole 20 MG Oral Tablet; TAKE 1 TABLET AT BEDTIME    Signatures   Electronically signed by : EDGAR Penn ; Nov 9 2016  1:32PM EST                       (Author)

## 2018-01-16 NOTE — RESULT NOTES
Verified Results  (1) LIPID PANEL, FASTING 26Jun2017 06:33AM Umm Ridsherin     Test Name Result Flag Reference   CHOLESTEROL 140 mg/dL     HDL,DIRECT 30 mg/dL L 40-60   Specimen collection should occur prior to Metamizole administration due to the potential for falsely depressed results  LDL CHOLESTEROL CALCULATED 63 mg/dL  0-100   Triglyceride:         Normal              <150 mg/dl       Borderline High    150-199 mg/dl       High               200-499 mg/dl       Very High          >499 mg/dl  Cholesterol:         Desirable        <200 mg/dl      Borderline High  200-239 mg/dl      High             >239 mg/dl  HDL Cholesterol:        High    >59 mg/dL      Low     <41 mg/dL  LDL CALCULATED:    This screening LDL is a calculated result  It does not have the accuracy of the Direct Measured LDL in the monitoring of patients with hyperlipidemia and/or statin therapy  Direct Measure LDL (GLI128) must be ordered separately in these patients  TRIGLYCERIDES 234 mg/dL H <=150   Specimen collection should occur prior to N-Acetylcysteine or Metamizole administration due to the potential for falsely depressed results  (1) TSH WITH FT4 REFLEX 26Jun2017 06:33AM Umm Ridsherin     Test Name Result Flag Reference   TSH 2 870 uIU/mL  0 358-3 740   Patients undergoing fluorescein dye angiography may retain small amounts of fluorescein in the body for 48-72 hours post procedure  Samples containing fluorescein can produce falsely depressed TSH values  If the patient had this procedure,a specimen should be resubmitted post fluorescein clearance  The recommended reference ranges for TSH during pregnancy are as follows:  First trimester 0 1 to 2 5 uIU/mL  Second trimester  0 2 to 3 0 uIU/mL  Third trimester 0 3 to 3 0 uIU/m     (1) HEMOGLOBIN A1C 26Jun2017 06:33AM Umm Ridsherin     Test Name Result Flag Reference   HEMOGLOBIN A1C 6 4 % H 4 2-6 3   EST  AVG   GLUCOSE 137 mg/dl

## 2018-01-16 NOTE — MISCELLANEOUS
Message   Recorded as Task   Date: 06/28/2017 09:23 PM, Created By: Fabian Bautista   Task Name: Follow Up   Assigned To: Everardo Melgoza   Regarding Patient: Chay Ramirez, Status: In Progress   Comment:    Humza Gusman - 28 Jun 2017 9:23 PM     TASK CREATED  Hello    Can patient also have UPCR, UA, Urine culture, BK PCR sent with the labs Dr Sully Brennan requested  Thank you    Ana Maria Serrano - 29 Jun 2017 12:23 PM     TASK IN PROGRESS   All above labs have been ordered  She will be going to have these labs drawn on 7/8  [facility] 6/29/2017      Active Problems    1  Abnormal EKG (794 31) (R94 31)   2  Acid reflux disease (530 81) (K21 9)   3  Acute kidney injury superimposed on CKD (866 00,585 9) (N17 9,N18 9)   4  Anemia (285 9) (D64 9)   5  Antibiotic prophylaxis for dental procedure indicated due to prior joint replacement   (V58 62) (Z79 2)   6  Atrial fibrillation (427 31) (I48 91)   7  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)   8  Benign hypertensive CKD (403 10) (I12 9)   9  Bilateral carotid bruits (785 9) (R09 89)   10  Bilateral leg edema (782 3) (R60 0)   11  Bruit of left carotid artery (785 9) (R09 89)   12  Cataract (366 9) (H26 9)   13  Chronic constipation (564 00) (K59 09)   14  Chronic diastolic congestive heart failure (428 32,428 0) (I50 32)   15  Chronic kidney disease, stage 4 (severe) (585 4) (N18 4)   16  Cocaine abuse in remission (305 63) (F14 10)   17  Cognitive changes (799 59) (R41 89)   18  Colon cancer screening (V76 51) (Z12 11)   19  Constipation (564 00) (K59 00)   20  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, without long-term    current use of insulin (250 61) (E10 49)   21  Diabetes mellitus with diabetic nephropathy (250 40,583 81) (E11 21)   22  Diabetic Gastropathy (537 9)   23  Diabetic polyneuropathy (250 60,357 2) (E11 42)   24  Diabetic retinopathy (250 50,362 01) (E11 319)   25  Diabetic Ulcer Of The Left Foot (250 80)   26   Diastolic dysfunction (429 9) (I51 9)   27  ROD (dyspnea on exertion) (786 09) (R06 09)   28  Drug-induced Essential Tremor (333 1)   29  Encounter for screening mammogram for breast cancer (V76 12) (Z12 31)   30  Esophagitis, reflux (530 11) (K21 0)   31  External Hemorrhoids (455 3)   32  Fatigue (780 79) (R53 83)   33  FELIPE (generalized anxiety disorder) (300 02) (F41 1)   34  H/O kidney transplant (V42 0) (Z94 0)   35  Heart palpitations (785 1) (R00 2)   36  Hospital discharge follow-up (V67 59) (Z09)   40  Hyperlipidemia (272 4) (E78 5)   38  Hyperplastic colon polyp (211 3) (K63 5)   39  Hypertension (401 9) (I10)   40  Hyponatremia (276 1) (E87 1)   41  Hypothyroidism (244 9) (E03 9)   42  Increased frequency of urination (788 41) (R35 0)   43  Increased white blood cell count (288 60) (D72 829)   44  Insomnia (780 52) (G47 00)   45  Irritable bowel syndrome (564 1) (K58 9)   46  Major depressive disorder, recurrent episode, in full remission (296 36) (F33 42)   47  Memory loss (780 93) (R41 3)   48  Memory loss or impairment (780 93) (R41 3)   49  Metabolic acidosis (796 9) (E87 2)   50  MGUS (monoclonal gammopathy of unknown significance) (273 1) (D47 2)   51  Need for influenza vaccination (V04 81) (Z23)   52  Nonrheumatic aortic valve insufficiency (424 1) (I35 1)   53  OAB (overactive bladder) (596 51) (N32 81)   54  Osteopenia (733 90) (M85 80)   55  Osteoporosis (733 00) (M81 0)   56  Other calculus in bladder (594 1) (N21 0)   57  Peripheral vascular disease (443 9) (I73 9)   58  Post traumatic stress disorder (309 81) (F43 10)   59  Preop general physical exam (V72 83) (Z01 818)   60  Preoperative cardiovascular examination (V72 81) (Z01 810)   61  Proteinuria (791 0) (R80 9)   62  Pyelonephritis (590 80) (N12)   63  Rectal polyp (569 0) (K62 1)   64  Recurrent UTI (599 0) (N39 0)   65  Restless legs syndrome (333 94) (G25 81)   66  Secondary hyperparathyroidism, renal (588 81) (N25 81)   67   Snoring (786 09) (R06 83)   68  SOB (shortness of breath) (786 05) (R06 02)   69  Tremor (781 0) (R25 1)   70  Unsteady gait (781 2) (R26 81)   71  Weakness (780 79) (R53 1)    Current Meds   1  AmLODIPine Besylate 10 MG Oral Tablet; take 1/2 tab  BID; Therapy: 51CER4717 to (Last Rx:07Apr2017)  Requested for: 94PBM2206 Ordered   2  ARIPiprazole 30 MG Oral Tablet; Take 1 tablet by mouth at bedtime; Therapy: 48GEI5002 to (Evaluate:78Uni9257)  Requested for: 44TQU0260; Last   Rx:11Cmj3463 Ordered   3  Aspirin 81 MG TABS; TAKE 1 TABLET DAILY; Therapy: 80MDS4910 to (Evaluate:19Jun2016) Recorded   4  Catapres 0 1 MG Oral Tablet; take 3 tab  in am, 2 tab at noon, 3 tab  at night; Therapy: 52RJA4165 to (Last Rx:22Mar2017)  Requested for: 23Mar2017 Ordered   5  Clindamycin HCl - 300 MG Oral Capsule; TAKE 2 CAPSULES 1 HOUR PRIOR TO   DENTAL APPOINTMENT; Therapy: 86VNB9689 to (Frank Cornelius)  Requested for: 13Apr2017; Last   Rx:07Apr2017 Ordered   6  Doxazosin Mesylate 1 MG Oral Tablet; TAKE 1 TABLET Bedtime  Requested for:   18CFW4391; Last Rx:05Apr2017 Ordered   7  DULoxetine HCl - 60 MG Oral Capsule Delayed Release Particles; TAKE 1 CAPSULE   TWICE DAILY; Therapy: 02XHO2243 to (Evaluate:21Zwl2395)  Requested for: 18JYF0095; Last   Rx:02Mar2017 Ordered   8  Flonase Allergy Relief 50 MCG/ACT Nasal Suspension; USE 2 SPRAYS IN EACH   NOSTRIL ONCE DAILY; Therapy: 13JCS4325 to Recorded   9  Folic Acid 1 MG Oral Tablet; TAKE 1 TABLET DAILY AS DIRECTED; Therapy: 19JID5598 to (Evaluate:07Oct2017)  Requested for: 75TSV6986; Last   Rx:12Oct2016 Ordered   10  Furosemide 20 MG Oral Tablet; take 1/2 tab  daily; Therapy: 66Dji8200 to Recorded   11  Generlac 10 GM/15ML Oral Solution; TAKE 30 ML BY MOUTH daily; Therapy: 84FEY2885 to (Sagar Andrade)  Requested for: 81IQH3915; Last    Rx:02Jun2017 Ordered   12  HydrALAZINE HCl - 50 MG Oral Tablet; TAKE 1 TABLET 3 TIMES DAILY;     Therapy: 02CEL1128 to (Alissa Marshall) Requested for: 47UOB7545; Last    Rx:13Mar2017 Ordered   13  Lactulose 10 GM/15ML Oral Solution; TAKE 30 ML DAILY; Therapy: 63EOQ6873 to (Last Rx:02Jun2017)  Requested for: 02Jun2017 Ordered   14  Levothyroxine Sodium 88 MCG Oral Tablet; take 1 tablet by mouth daily; Therapy: 01RPP5748 to (Evaluate:15Mar2017)  Requested for: 75BHZ7299; Last    Rx:31Swf4568 Ordered   15  LORazepam 2 MG Oral Tablet; TAKE 1 TABLET 3 TIMES DAILY AS NEEDED; Therapy: 71VOT5024 to (Evaluate:44Sgm9058)  Requested for: 11HHO1061; Last    Rx:16Nov2016 Ordered   16  Magnesium 200 MG Oral Tablet; Therapy: 49MYW1984 to Recorded   17  Metoprolol Tartrate 50 MG Oral Tablet; take 1 tablet by mouth twice daily; Therapy: 94LFR7665 to (Evaluate:80Rjd5844)  Requested for: 27Jun2017; Last    CO:49CFJ3773 Ordered   18  OneTouch Ultra Blue In [employer]; test twice daily; Therapy: 13WTQ3837 to (Evaluate:24Mar2018)  Requested for: 21GEB2932; Last    Rx:29Mar2017 Ordered   19  OneTouch UltraSoft Lancets Miscellaneous; test twice daily; Therapy: 64WDF4425 to ([employer] Locker)  Requested for: 63DYF2505; Last    Rx:09Mar2016 Ordered   20  Pravastatin Sodium 80 MG Oral Tablet; take 1 tablet by mouth every day; Therapy: 23HPW2650 to (Evaluate:95Rtv5475)  Requested for: 46TAU9175; Last    Rx:22Jan2017 Ordered   21  PredniSONE 5 MG Oral Tablet; TAKE 1 1/2 TABLETS BY MOUTH EVERY DAY; Therapy: 80KUQ5936 to (Evaluate:92Tvj2759)  Requested for: 67UIT3263; Last    Rx:06Jun2017 Ordered   22  ROPINIRole HCl - 0 25 MG Oral Tablet; take 1 tablet by mouth twice daily; Therapy: 42KIM1557 to (Evaluate:01Clw7561)  Requested for: 02VGH4601; Last    Rx:30Jun2016 Ordered   23  Sertraline HCl - 100 MG Oral Tablet; TAKE 2 TABLETS DAILY; Therapy: 32MQG5163 to (Evaluate:82Frk0334)  Requested for: 57ZUX3338; Last    Rx:16Nov2016 Ordered   24  Sodium Bicarbonate 650 MG Oral Tablet; Take one tablet daily;     Therapy: 07QTG0902 to (Evaluate:12Apr2016) Requested for: 80UGN3055 Recorded   25  Tacrolimus 1 MG Oral Capsule; Take 4 in the morning and 4 in the evening  Requested    for: 27Jun2017; Last Rx:26Jun2017 Ordered   26  TraZODone HCl - 150 MG Oral Tablet; TAKE 1 TABLET AT BEDTIME; Therapy: 09HHX4760 to (Evaluate:57Rwn1308)  Requested for: 16JQJ3854; Last    Rx:02Mar2017 Ordered   27  VESIcare 5 MG Oral Tablet; take one tablet daily; Therapy: 29Ngh3383 to Recorded   28  Vitamin D3 1000 UNIT Oral Capsule; TAKE 3 PILLS AT NOON BY MOUTH; Therapy: (Recorded:17Jun2016) to Recorded    Allergies    1  Penicillins   2  Morphine Derivatives   3  Duricef TABS   4  Myrbetriq TB24    Plan   (1) URINE PROTEIN CREATININE RATIO; Status:Active; Requested PDO:64TYM0923;   Perform:[facility]; BEC:99XEH2024; Ordered; For:Chronic kidney disease, stage 4 (severe), Proteinuria; Ordered By:Humza Gusman;   (1) URINALYSIS (will reflex a microscopy if leukocytes, occult blood, protein or nitrites are not within normal limits); Status:Active; Requested GBQ:36VAU9668;   Perform:[facility]; NTQ:55KVC3831; Ordered; For:Proteinuria, Pyelonephritis, Recurrent UTI; Ordered By:Humza Gusman;   (1) URINE CULTURE; Source:Urine, Clean Catch; Status:Active; Requested ITM:51OFM2259;   Perform:[facility]; MOA:75XTK3779; Ordered; For:Chronic kidney disease, stage 4 (severe), Pyelonephritis, Recurrent UTI; Ordered By:Humza Gusman;   (1) BK VIRUS QUANT PCR, BLOOD; Status:Active; Requested CRV:71RYD3641;   Perform:[facility]; YGB:33HXW5375; Ordered;     For:Chronic kidney disease, stage 4 (severe), H/O kidney transplant; Ordered By:Humza Gusman;      Signatures   Electronically signed by : EDGAR De Guzman ; Jul 5 2017 11:22AM EST                       (Author)

## 2018-01-16 NOTE — MISCELLANEOUS
Chief Complaint  Chief Complaint Free Text Note Form: Urinary tract infection      History of Present Illness  TCM Communication [facility]: The patient is being contacted for follow-up after hospitalization and 9/19/2017  She was hospitalized at [facility]  The dates of hospitalization: 3 days, date of admission: 9/13/2017, date of discharge: 9/16/2017  Diagnosis: Urinary tact infection  She was discharged to home  Medications were not reviewed today  She refused a follow up appointment due to   Follow-up appointments with other specialists: Patient states she has a follow up 10/6/17 she will not come in before that to see the doctor  The patient is currently asymptomatic  Counseling was provided to the patient  Communication performed and completed by Dawn ARMENDARIZ      Active Problems    1  Abnormal EKG (794 31) (R94 31)   2  Acid reflux disease (530 81) (K21 9)   3  Acute kidney injury superimposed on CKD (866 00,585 9) (N17 9,N18 9)   4  Acute UTI (599 0) (N39 0)   5  Anemia (285 9) (D64 9)   6  Antibiotic prophylaxis for dental procedure indicated due to prior joint replacement   (V58 62) (Z79 2)   7  Atrial fibrillation (427 31) (I48 91)   8  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)   9  Benign hypertensive CKD (403 10) (I12 9)   10  Bilateral carotid bruits (785 9) (R09 89)   11  Bilateral leg edema (782 3) (R60 0)   12  Bruit of left carotid artery (785 9) (R09 89)   13  Cataract (366 9) (H26 9)   14  Chronic constipation (564 00) (K59 09)   15  Chronic diastolic congestive heart failure (428 32,428 0) (I50 32)   16  Chronic kidney disease, stage 4 (severe) (585 4) (N18 4)   17  Cocaine abuse in remission (305 63) (F14 10)   18  Cognitive changes (799 59) (R41 89)   19  Colon cancer screening (V76 51) (Z12 11)   20  Constipation (564 00) (K59 00)   21  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, without long-term    current use of insulin (250 61) (E10 49)   22   Diabetes mellitus with diabetic nephropathy (250 40,583 81) (E11 21)   23  Diabetic Gastropathy (537 9)   24  Diabetic polyneuropathy (250 60,357 2) (E11 42)   25  Diabetic retinopathy (250 50,362 01) (E11 319)   26  Diabetic Ulcer Of The Left Foot (250 80)   27  Diastolic dysfunction (017 6) (I51 9)   28  ROD (dyspnea on exertion) (786 09) (R06 09)   29  Drug-induced Essential Tremor (333 1)   30  Encounter for screening mammogram for breast cancer (V76 12) (Z12 31)   31  Esophagitis, reflux (530 11) (K21 0)   32  External Hemorrhoids (455 3)   33  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)   34  Fatigue (780 79) (R53 83)   35  FELIPE (generalized anxiety disorder) (300 02) (F41 1)   36  H/O kidney transplant (V42 0) (Z94 0)   37  Heart palpitations (785 1) (R00 2)   38  Hospital discharge follow-up (V67 59) (Z09)   39  Hyperlipidemia (272 4) (E78 5)   40  Hyperplastic colon polyp (211 3) (K63 5)   41  Hypertension (401 9) (I10)   42  Hyponatremia (276 1) (E87 1)   43  Hypothyroidism (244 9) (E03 9)   44  Increased frequency of urination (788 41) (R35 0)   45  Increased white blood cell count (288 60) (D72 829)   46  Insomnia (780 52) (G47 00)   47  Irritable bowel syndrome (564 1) (K58 9)   48  Major depressive disorder, recurrent episode, in full remission (296 36) (F33 42)   49  Memory loss (780 93) (R41 3)   50  Memory loss or impairment (780 93) (R41 3)   51  Metabolic acidosis (598 8) (E87 2)   52  MGUS (monoclonal gammopathy of unknown significance) (273 1) (D47 2)   53  Need for influenza vaccination (V04 81) (Z23)   54  Nonrheumatic aortic valve insufficiency (424 1) (I35 1)   55  OAB (overactive bladder) (596 51) (N32 81)   56  Osteopenia (733 90) (M85 80)   57  Osteoporosis (733 00) (M81 0)   58  Other calculus in bladder (594 1) (N21 0)   59  Peripheral vascular disease (443 9) (I73 9)   60  Post traumatic stress disorder (309 81) (F43 10)   61  Preop general physical exam (V72 83) (Z01 818)   62  Preoperative cardiovascular examination (V72 81) (Z01 810)   63  Proteinuria (791 0) (R80 9)   64  Pyelonephritis (590 80) (N12)   65  Rectal polyp (569 0) (K62 1)   66  Recurrent UTI (599 0) (N39 0)   67  Restless legs syndrome (333 94) (G25 81)   68  Secondary hyperparathyroidism, renal (588 81) (N25 81)   69  Snoring (786 09) (R06 83)   70  SOB (shortness of breath) (786 05) (R06 02)   71  Status post amputation of right great toe (V49 71) (Z89 411)   72  Status post pancreas transplantation (V42 83) (Z94 83)   73  Status post transmetatarsal amputation of left foot (V49 73) (Z89 432)   74  Tremor (781 0) (R25 1)   75  Unsteady gait (781 2) (R26 81)   76  Weakness (780 79) (R53 1)    Past Medical History    1  History of Abnormal Liver Function Test (790 6)   2  History of Abnormal urine (791 9) (R82 90)   3  History of Abscess of buttock, right (682 5) (L02 31)   4  History of Acute kidney injury (584 9) (N17 9)   5  History of Acute on chronic diastolic congestive heart failure (428 33,428 0) (I50 33)   6  History of Bilateral impacted cerumen (380 4) (H61 23)   7  History of Cervical Dysplasia (V13 22)   8  History of Denial Of Any Significant Medical History   9  History of Diabetes mellitus with foot ulcer (250 80,707 15) (E11 621,L97 509)   10  History of allergic urticaria (V13 3) (Z87 2)   11  History of cholelithiasis (V12 79) (Z87 19)   12  History of dysuria (V13 00) (Z87 898)   13  History of encephalopathy (V12 49) (Z86 69)   14  History of gastritis (V12 79) (Z87 19)   15  Denied: History of head injury   16  History of hematuria (V13 09) (Z87 448)   17  History of hyperkalemia (V12 29) (Z86 39)   18  History of pneumonia (V12 61) (Z87 01)   19  History of seborrhea (V13 3) (Z87 2)   20  History of urinary tract infection (V13 02) (Z87 440)   21  History of urinary tract infection (V13 02) (Z87 440)   22  History of Iliotibial band syndrome (728 89) (M76 30)   23   History of Infected tooth (522 4) (K04 7)   24  History of Lumbar radiculopathy (724 4) (M54 16)   25  History of Neck pain (723 1) (M54 2)   26  History of Nonrheumatic aortic valve insufficiency (424 1) (I35 1)   27  Denied: History of Seizures   28  History of Sinus Tachycardia (427 89)   29  History of Trochanteric bursitis, unspecified laterality (726 5) (M70 60)   30  History of Urinary Tract Infection (V13 02)   31  History of UTI (urinary tract infection), bacterial (599 0,041 9) (N39 0,A49 9)   32  History of Vaginal itching (698 1) (L29 8)    Surgical History    1  History of Cataract Surgery   2  History of Cholecystectomy   3  History of Complete Colonoscopy   4  History of Complete Colonoscopy   5  History of Diagnostic Esophagogastroduodenoscopy   6  History of Diagnostic Esophagogastroduodenoscopy   7  History of Dilation And Curettage   8  History of Foot Surgery   9  History of Pancreatic Transplantation   10  Renal Transplant   11  History of Surgery Left Foot Amputation    Family History  Mother    1  No family history of mental disorder  Father    2  Family history of cancer (V16 9) (Z80 9)   3  No family history of mental disorder  Sister    4  Family history of depression (V17 0) (Z81 8)  Grandparent    5  Family history of cancer (V16 9) (Z80 9)  Maternal Grandmother    6  Family history of Breast Cancer (V16 3)    Social History    · Denied: History of Alcohol Use (History)   · Former smoker (V15 82) (I07 513)   · History of Uses cocaine    Current Meds   1  Acetaminophen 325 MG Oral Tablet; TAKE 1 TO 2 TABLETS EVERY 6 HOURS AS   NEEDED Recorded   2  AmLODIPine Besylate 10 MG Oral Tablet; TAKE 1 TABLET BY MOUTH EVERY MORNING   AND 1/2 TABLET BY MOUTH EVERY EVENING; Therapy: 09AGR4305 to (Mojgan Graves)  Requested for: 11Aug2017; Last   Rx:11Aug2017 Ordered   3  ARIPiprazole 20 MG Oral Tablet; Therapy: 95ZAA3395 to  Requested for: 55Zsz0261 Recorded   4  Aspirin 81 MG TABS; TAKE 1 TABLET DAILY;    Therapy: 19RGX2657 to (Evaluate:19Jun2016) Recorded   5  BD Pen Needle Mary U/F 32G X 4 MM Miscellaneous; Use 4x daily; Therapy: 26WYS6281 to (Evaluate:93Uev9211)  Requested for: 47DPV1245; Last   Rx:83Kfl0523 Ordered   6  Catapres 0 1 MG Oral Tablet; take 3 tab  in am, 2 tab at noon, 3 tab  at night; Therapy: 83YWS8241 to (Last Rx:89Xjd6909)  Requested for: 36Uya6093 Ordered   7  Cephalexin 500 MG Oral Capsule; TAKE 1 CAPSULE TWICE DAILY; Therapy: 97Xuw3969 to (Evaluate:64Fqu1773)  Requested for: 76Pit1696; Last   Rx:86Xqx8810 Ordered   8  Clindamycin HCl - 300 MG Oral Capsule; take 2 caps  1 hr  prior to dental procedure; Therapy: 32QAZ2263 to (Last Rx:01Cbx1778)  Requested for: 43Obg1412 Ordered   9  Doxazosin Mesylate 1 MG Oral Tablet; TAKE 1 TABLET Bedtime  Requested for:   53APS9440; Last Rx:25Mts8282 Ordered   10  DULoxetine HCl - 60 MG Oral Capsule Delayed Release Particles; TAKE 1 CAPSULE BY    MOUTH TWICE DAILY; Therapy: 85KQI3611 to (Evaluate:79Lrk8021)  Requested for: 30Ryf6116; Last    Rx:65Vcd3122 Ordered   11  Folic Acid 1 MG Oral Tablet; TAKE 1 TABLET DAILY AS DIRECTED; Therapy: 23LDN5000 to (77 873 135)  Requested for: 89SLJ6443; Last    Rx:05Bhv6099 Ordered   12  Furosemide 20 MG Oral Tablet; TAKE 1 TABLET DAILY; Therapy: 35Xxh9625 to (Evaluate:93Ayi3407) Recorded   13  HumaLOG KwikPen 100 UNIT/ML Subcutaneous Solution Pen-injector; Inject 5 units 3    times daily with meals; Therapy: 72JFC0385 to (Evaluate:76Dfy3621)  Requested for: 34Hzi4102 Recorded   14  HydrALAZINE HCl - 50 MG Oral Tablet; TAKE 1 TABLET 3 TIMES DAILY; Therapy: 60OVL3501 to (Theador Mis)  Requested for: 51DEO0240; Last    Rx:06Ieb9360 Ordered   15  Lactulose 10 GM/15ML Oral Solution; TAKE 30 ML DAILY; Therapy: 91QJU8950 to (Last Rx:02Jun2017)  Requested for: 02Jun2017 Ordered   16  Levothyroxine Sodium 88 MCG Oral Tablet; take 1 tablet by mouth daily;     Therapy: 47LOG3015 to (Evaluate:33Gnw3358)  Requested for: 08Pef9405 Recorded   17  LORazepam 2 MG Oral Tablet; Take 1 tablet 3 times daily as needed; Therapy: 09RWE2123 to (Evaluate:97Buk7692)  Requested for: [address]; Last    Rx:92Hwt2474 Ordered   18  Metoprolol Tartrate 50 MG Oral Tablet; take 1 tablet by mouth twice daily; Therapy: 91MOS4899 to (Evaluate:21Wcy3064)  Requested for: 2017; Last    S96YFJ9282 Ordered   19  OneTouch Ultra Blue In [employer]; Testing 4 times daily as directed by physician; Therapy: 24MZU7691 to (Evaluate:2018)  Requested for: 44Ivu8646; Last    Rx:82Bas2454 Ordered   20  OneTouch UltraSoft Lancets Miscellaneous; test twice daily; Therapy: 51YUG4855 to (Evaluate:07Pmq8881)  Requested for: 29Zgc2399 Recorded   21  Pravastatin Sodium 80 MG Oral Tablet; take 1 tablet by mouth every day; Therapy: 00YQH9850 to (Evaluate:64Pfj2153)  Requested for: 22QXN2954; Last    Rx:2017 Ordered   22  PredniSONE 5 MG Oral Tablet; TAKE 1 1/2 TABLETS BY MOUTH EVERY DAY; Therapy: 79FWZ6489 to (Evaluate:86Crs5889)  Requested for: 32EBV3745; Last    Rx:2017 Ordered   23  ROPINIRole HCl - 0 25 MG Oral Tablet; take 1 tablet by mouth twice daily; Therapy: 15UXB3382 to (Suellyn Frankel)  Requested for: 50Plk7496; Last    Rx:76Esg0728 Ordered   24  Sertraline HCl - 100 MG Oral Tablet; TAKE 2 TABLET BY MOUTH DAILY; Therapy: 80LTJ4129 to ([phone])  Requested for: 56VLS4094; Last    Rx:37Zmv4149 Ordered   25  Sodium Bicarbonate 650 MG Oral Tablet; Take one tablet daily; Therapy: 18IUS1587 to (Evaluate:2016)  Requested for: 80XYJ4314 Recorded   26  Tacrolimus 1 MG Oral Capsule; Take 4 in the morning and 3 in the evening  Requested    for: 57Ixi8612; Last Rx:46Scu8498 Ordered   27  Toujeo SoloStar 300 UNIT/ML Subcutaneous Solution Pen-injector; INJECT 20 UNIT    Bedtime; Therapy: 98QFH9357 to (Evaluate:26Wcx8134)  Requested for: 98Jjb7679 Recorded   28   TraZODone HCl - 150 MG Oral Tablet; TAKE 1 TABLET AT BEDTIME; Therapy: 86UIS8890 to (Jassonrachael David)  Requested for: 21Gtb7669; Last    Rx:03Llr5379 Ordered   29  Vitamin D3 1000 UNIT Oral Capsule; TAKE 3 PILLS AT NOON BY MOUTH; Therapy: (Recorded:84Lbv5796) to Recorded    Allergies    1  Penicillins   2  Morphine Derivatives   3  Duricef TABS   4  Myrbetriq TB24    Future Appointments    Date/Time Provider Specialty Site   10/06/2017 09:30 AM EDGAR Hartmann  [facility] [address] [place] [address]   10/20/2017 09:15 AM Delores Asencio RD Diabetes Educator [address] ENDOCRINOLOGY   10/02/2017 08:15 AM Herlinda Apgar), M D  Hematology Oncology [facility] ONCOLOGY [place]   2017 02:15 PM EDGAR English  Psychiatry [facility] PSYCHIATRIC ASS   2017 09:00 AM EDGAR Palomares   Nephrology [facility] [facility]   2017 09:15 AM [facility] Endocrinology [address] ENDOCRINOLOGY   10/23/2017 01:00 PM Gilda Miguel [facility] Nephrology [facility]     Signatures   Electronically signed by : EDGAR Huggins ; Sep 20 2017 12:04PM EST                       (Author)
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
## 2018-01-16 NOTE — PROGRESS NOTES
History of Present Illness  HPI: Patient here for evaluation of recurrent urinary tract infection  She has a history of a kidney/pancreas transplant in the past back in 1998, and has been admitted to the hospital several times for a presumptive UTI  I had most recently seen her back in August 2016 for the same problem  At that time she was suffering from chronic recurrent dysuria of unknown significance  Her urine culture at that time did grow Escherichia coli and she was treated with cefazolin and then Keflex  She has had repeated bouts of positive urine cultures and admissions for nonspecific symptoms such as global fatigue and encephalopathy  She was most recently admitted in June with a UTI once again and was treated in a similar fashion  She had previously been followed by Dr Gareth Clark of urology  She had a recent renal ultrasound that revealed increased resistance of flow in the transplanted kidney  She previously had some bladder stones but that issue seems to have resolved  Repeat imaging has not revealed any obstruction or other anatomic issues  Since being off the most recent course of antibiotics the patient has developed some dysuria  She was seen at [facility] and had a urinalysis done that revealed yeast, but very little in the way of inflammatory cells  She has been suffering from a yeast vaginitis for which she has been using topical miconazole  She is not having any fever or chills or sweats, she denies any nausea vomiting or diarrhea, denies any cough or difficulty breathing  Review of Systems  Complete-Female:   Constitutional: No fever, no chills, feels well, no tiredness, no recent weight gain or weight loss  Cardiovascular: No complaints of slow heart rate, no fast heart rate, no chest pain, no palpitations, no leg claudication, no lower extremity edema  Respiratory: No complaints of shortness of breath, no wheezing, no cough, no SOB on exertion, no orthopnea, no PND  Gastrointestinal: No complaints of abdominal pain, no constipation, no nausea or vomiting, no diarrhea, no bloody stools  Genitourinary: as noted in HPI  Musculoskeletal: No complaints of arthralgias, no myalgias, no joint swelling or stiffness, no limb pain or swelling  Active Problems    1  Abnormal EKG (794 31) (R94 31)   2  Acid reflux disease (530 81) (K21 9)   3  Acute kidney injury superimposed on CKD (866 00,585 9) (N17 9,N18 9)   4  Anemia (285 9) (D64 9)   5  Antibiotic prophylaxis for dental procedure indicated due to prior joint replacement   (V58 62) (Z79 2)   6  Atrial fibrillation (427 31) (I48 91)   7  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)   8  Benign hypertensive CKD (403 10) (I12 9)   9  Bilateral carotid bruits (785 9) (R09 89)   10  Bilateral leg edema (782 3) (R60 0)   11  Bruit of left carotid artery (785 9) (R09 89)   12  Cataract (366 9) (H26 9)   13  Chronic constipation (564 00) (K59 09)   14  Chronic diastolic congestive heart failure (428 32,428 0) (I50 32)   15  Chronic kidney disease, stage 4 (severe) (585 4) (N18 4)   16  Cocaine abuse in remission (305 63) (F14 10)   17  Cognitive changes (799 59) (R41 89)   18  Colon cancer screening (V76 51) (Z12 11)   19  Constipation (564 00) (K59 00)   20  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, without long-term    current use of insulin (250 61) (E10 49)   21  Diabetes mellitus with diabetic nephropathy (250 40,583 81) (E11 21)   22  Diabetic Gastropathy (537 9)   23  Diabetic polyneuropathy (250 60,357 2) (E11 42)   24  Diabetic retinopathy (250 50,362 01) (E11 319)   25  Diabetic Ulcer Of The Left Foot (250 80)   26  Diastolic dysfunction (557 7) (I51 9)   27  ROD (dyspnea on exertion) (786 09) (R06 09)   28  Drug-induced Essential Tremor (333 1)   29  Encounter for screening mammogram for breast cancer (V76 12) (Z12 31)   30  Esophagitis, reflux (530 11) (K21 0)   31  External Hemorrhoids (455 3)   32  Fatigue (780 79) (R53 83)   33  FELIPE (generalized anxiety disorder) (300 02) (F41 1)   34  H/O kidney transplant (V42 0) (Z94 0)   35  Heart palpitations (785 1) (R00 2)   36  Hospital discharge follow-up (V67 59) (Z09)   40  Hyperlipidemia (272 4) (E78 5)   38  Hyperplastic colon polyp (211 3) (K63 5)   39  Hypertension (401 9) (I10)   40  Hyponatremia (276 1) (E87 1)   41  Hypothyroidism (244 9) (E03 9)   42  Increased frequency of urination (788 41) (R35 0)   43  Increased white blood cell count (288 60) (D72 829)   44  Insomnia (780 52) (G47 00)   45  Irritable bowel syndrome (564 1) (K58 9)   46  Major depressive disorder, recurrent episode, in full remission (296 36) (F33 42)   47  Memory loss (780 93) (R41 3)   48  Memory loss or impairment (780 93) (R41 3)   49  Metabolic acidosis (132 9) (E87 2)   50  MGUS (monoclonal gammopathy of unknown significance) (273 1) (D47 2)   51  Need for influenza vaccination (V04 81) (Z23)   52  Nonrheumatic aortic valve insufficiency (424 1) (I35 1)   53  OAB (overactive bladder) (596 51) (N32 81)   54  Osteopenia (733 90) (M85 80)   55  Osteoporosis (733 00) (M81 0)   56  Other calculus in bladder (594 1) (N21 0)   57  Peripheral vascular disease (443 9) (I73 9)   58  Post traumatic stress disorder (309 81) (F43 10)   59  Preop general physical exam (V72 83) (Z01 818)   60  Preoperative cardiovascular examination (V72 81) (Z01 810)   61  Proteinuria (791 0) (R80 9)   62  Pyelonephritis (590 80) (N12)   63  Rectal polyp (569 0) (K62 1)   64  Recurrent UTI (599 0) (N39 0)   65  Restless legs syndrome (333 94) (G25 81)   66  Secondary hyperparathyroidism, renal (588 81) (N25 81)   67  Snoring (786 09) (R06 83)   68  SOB (shortness of breath) (786 05) (R06 02)   69  Tremor (781 0) (R25 1)   70  Unsteady gait (781 2) (R26 81)   71  Weakness (780 79) (R53 1)    Past Medical History    1  History of Abnormal Liver Function Test (790 6)   2   History of Abnormal urine (791 9) (R82 90)   3  History of Abscess of buttock, right (682 5) (L02 31)   4  History of Acute kidney injury (584 9) (N17 9)   5  History of Acute on chronic diastolic congestive heart failure (428 33,428 0) (I50 33)   6  History of Bilateral impacted cerumen (380 4) (H61 23)   7  History of Cervical Dysplasia (V13 22)   8  History of Denial Of Any Significant Medical History   9  History of Diabetes mellitus with foot ulcer (250 80,707 15) (E11 621,L97 509)   10  History of allergic urticaria (V13 3) (Z87 2)   11  History of cholelithiasis (V12 79) (Z87 19)   12  History of dysuria (V13 00) (Z87 898)   13  History of encephalopathy (V12 49) (Z86 69)   14  History of gastritis (V12 79) (Z87 19)   15  Denied: History of head injury   16  History of hematuria (V13 09) (Z87 448)   17  History of hyperkalemia (V12 29) (Z86 39)   18  History of pneumonia (V12 61) (Z87 01)   19  History of seborrhea (V13 3) (Z87 2)   20  History of urinary tract infection (V13 02) (Z87 440)   21  History of urinary tract infection (V13 02) (Z87 440)   22  History of Iliotibial band syndrome (728 89) (M76 30)   23  History of Infected tooth (522 4) (K04 7)   24  History of Lumbar radiculopathy (724 4) (M54 16)   25  History of Neck pain (723 1) (M54 2)   26  History of Nonrheumatic aortic valve insufficiency (424 1) (I35 1)   27  Denied: History of Seizures   28  History of Sinus Tachycardia (427 89)   29  History of Trochanteric bursitis, unspecified laterality (726 5) (M70 60)   30  History of Urinary Tract Infection (V13 02)   31  History of UTI (urinary tract infection), bacterial (599 0,041 9) (N39 0,A49 9)   32  History of Vaginal itching (698 1) (L29 8)    Surgical History    1  History of Cataract Surgery   2  History of Cholecystectomy   3  History of Complete Colonoscopy   4  History of Complete Colonoscopy   5  History of Diagnostic Esophagogastroduodenoscopy   6  History of Diagnostic Esophagogastroduodenoscopy   7   History of Dilation And Curettage   8  History of Foot Surgery   9  History of Pancreatic Transplantation   10  Renal Transplant   11  History of Surgery Left Foot Amputation    Family History    1  No family history of mental disorder    2  Family history of cancer (V16 9) (Z80 9)   3  No family history of mental disorder    4  Family history of depression (V17 0) (Z81 8)    5  Family history of cancer (V16 9) (Z80 9)    6  Family history of Breast Cancer (V16 3)    Social History    · Denied: History of Alcohol Use (History)   · Former smoker (V15 82) (Z52 425)   · History of Uses cocaine    Current Meds   1  AmLODIPine Besylate 10 MG Oral Tablet; take 1/2 tab  BID; Therapy: 26TSU2079 to (Last Rx:07Apr2017)  Requested for: 86BAW7870 Ordered   2  ARIPiprazole 30 MG Oral Tablet; Take 1 tablet by mouth at bedtime; Therapy: 99WGW2417 to (Evaluate:15Zty4602)  Requested for: 42FZJ2935; Last   Rx:67Yxq6684 Ordered   3  Aspirin 81 MG TABS; TAKE 1 TABLET DAILY; Therapy: 63ROF7091 to (Evaluate:19Jun2016) Recorded   4  Catapres 0 1 MG Oral Tablet (CloNIDine HCl); take 3 tab  in am, 2 tab at noon, 3 tab  at   night; Therapy: 15ULS4784 to (Last Rx:22Mar2017)  Requested for: 23Mar2017 Ordered   5  Clindamycin HCl - 300 MG Oral Capsule; TAKE 2 CAPSULES 1 HOUR PRIOR TO   DENTAL APPOINTMENT; Therapy: 33LNL7315 to (Nayeli Miranda)  Requested for: 13Apr2017; Last   Rx:07Apr2017 Ordered   6  Doxazosin Mesylate 1 MG Oral Tablet; TAKE 1 TABLET Bedtime  Requested for:   53IIJ7018; Last Rx:05Apr2017 Ordered   7  DULoxetine HCl - 60 MG Oral Capsule Delayed Release Particles (Cymbalta); TAKE 1   CAPSULE TWICE DAILY; Therapy: 38FFA7397 to (Evaluate:04Giw7286)  Requested for: 69ROC0873; Last   Rx:02Mar2017 Ordered   8  Flonase Allergy Relief 50 MCG/ACT Nasal Suspension (Fluticasone Propionate); USE 2   SPRAYS IN EACH NOSTRIL ONCE DAILY; Therapy: 88WMI6735 to Recorded   9   Folic Acid 1 MG Oral Tablet; TAKE 1 TABLET DAILY AS DIRECTED; Therapy: 25DPH1582 to (Evaluate:07Oct2017)  Requested for: 94FEB4771; Last   Rx:12Oct2016 Ordered   10  Generlac 10 GM/15ML Oral Solution; TAKE 30 ML BY MOUTH daily; Therapy: 59MWC4319 to ([phone])  Requested for: 98CEC3560; Last    Rx:02Jun2017 Ordered   11  HydrALAZINE HCl - 50 MG Oral Tablet; TAKE 1 TABLET 3 TIMES DAILY; Therapy: 32ZZH1873 to (Mary Kay Power)  Requested for: 58BAN9052; Last    Rx:13Mar2017 Ordered   12  Lactulose 10 GM/15ML Oral Solution; TAKE 30 ML DAILY; Therapy: 82UDD8431 to (Last Rx:02Jun2017)  Requested for: 02Jun2017 Ordered   13  Levothyroxine Sodium 88 MCG Oral Tablet; take 1 tablet by mouth daily; Therapy: 06YOQ0557 to (Evaluate:15Mar2017)  Requested for: 37WVN7401; Last    Rx:90Dub2319 Ordered   14  LORazepam 2 MG Oral Tablet; TAKE 1 TABLET 3 TIMES DAILY AS NEEDED; Therapy: 11UCD0366 to (Evaluate:47Gna9864)  Requested for: 10SST6433; Last    Rx:11Qzz9848 Ordered   15  Magnesium 200 MG Oral Tablet; Therapy: 78MEK4299 to Recorded   16  Metoprolol Tartrate 50 MG Oral Tablet; take 1 tablet by mouth twice daily; Therapy: 89KVA8332 to (Evaluate:98Erz4727)  Requested for: 27Jun2017; Last    Rx:27Jun2017 Ordered   17  OneTouch Ultra Blue In [employer]; test twice daily; Therapy: 42RNJ2510 to (Evaluate:24Mar2018)  Requested for: 75HVT8238; Last    Rx:29Mar2017 Ordered   18  OneTouch UltraSoft Lancets Miscellaneous; test twice daily; Therapy: 07OBE1844 to (Reatha Hunting)  Requested for: 95ZCJ5998; Last    Rx:09Mar2016 Ordered   19  Pravastatin Sodium 80 MG Oral Tablet; take 1 tablet by mouth every day; Therapy: 13JNB6537 to (Evaluate:00Pts3911)  Requested for: 23VMH2456; Last    Rx:22Jan2017 Ordered   20  PredniSONE 5 MG Oral Tablet; TAKE 1 1/2 TABLETS BY MOUTH EVERY DAY; Therapy: 38OFK0086 to (Evaluate:21Lxq9652)  Requested for: 92SGD6399; Last    Rx:06Jun2017 Ordered   21   ROPINIRole HCl - 0 25 MG Oral Tablet; take 1 tablet by mouth twice daily; Therapy: 88STP0281 to (Evaluate:75Vxs7133)  Requested for: 50SRZ5319; Last    Rx:30Jun2016 Ordered   22  Sertraline HCl - 100 MG Oral Tablet; TAKE 2 TABLETS DAILY; Therapy: 33KZU0792 to (Evaluate:26Eds5899)  Requested for: 36NNQ9805; Last    Rx:51Xxc5198 Ordered   23  Sodium Bicarbonate 650 MG Oral Tablet; Take one tablet daily; Therapy: 54VCI1580 to (Evaluate:12Apr2016)  Requested for: 36EWC8077 Recorded   24  Tacrolimus 1 MG Oral Capsule (Prograf); Take 4 in the morning and 3 in the evening     Requested for: 81IQO8362; Last Rx:03Mgv8494 Ordered   25  TraZODone HCl - 150 MG Oral Tablet; TAKE 1 TABLET AT BEDTIME; Therapy: 87AUG1737 to (Evaluate:27Rdf7574)  Requested for: 80GQC5356; Last    Rx:02Mar2017 Ordered   26  VESIcare 5 MG Oral Tablet; take one tablet daily; Therapy: 84Chj7145 to Recorded   27  Vitamin D3 1000 UNIT Oral Capsule; TAKE 3 PILLS AT NOON BY MOUTH; Therapy: (Recorded:17Jun2016) to Recorded    Allergies    1  Penicillins   2  Morphine Derivatives   3  Duricef TABS   4  Myrbetriq TB24    Physical Exam    Constitutional   General appearance: No acute distress, well appearing and well nourished  Ears, Nose, Mouth, and Throat   Oropharynx: Normal with no erythema, edema, exudate or lesions  Pulmonary   Respiratory effort: No increased work of breathing or signs of respiratory distress  Auscultation of lungs: Clear to auscultation  Cardiovascular   Auscultation of heart: Normal rate and rhythm, normal S1 and S2, without murmurs  Examination of extremities for edema and/or varicosities: Normal     Abdomen   Abdomen: Non-tender, no masses  Liver and spleen: No hepatomegaly or splenomegaly  Lymphatic   Palpation of lymph nodes in neck: No lymphadenopathy  Assessment    1  Recurrent UTI (599 0) (N39 0)   2  Chronic kidney disease, stage 4 (severe) (585 4) (N18 4)   3  Diabetes mellitus with diabetic nephropathy (250 40,583 81) (E11 21)   4   H/O kidney transplant (V42 0) (Z94 0)    Discussion/Summary  Discussion Summary:   1  Recurrent UTI-the most recent urinalysis does not reveal evidence of UTI  Her symptoms are somewhat vague and hard to pin down as far as whether this is truly an invasive infection  She does however have some yeast noted in suffering from yeast vaginitis  I suspect that the yeast vaginitis is causing the current dysuria  She's had some modest improvement with topical therapy  I discussed the case in detail with Dr Hung Matt of nephrology  I also discussed this in detail with the patient  Would continue the Monistat topical therapy for at least another week  If the symptoms persist, the patient will call Dr Hung Matt next week and begin fluconazole 100 mg p o  q 24 hours Ã1 week  Dr Hung Matt will monitor the Prograf levels closely and adjust the dosing  The patient will also follow-up with Dr Meron Crews of urology  2  Chronic kidney disease-renal function has actually improved modestly and now remained stable  Follow-up with Dr Hung Matt nephrology    3  Yeast vaginitis-topical therapy as above  Also suggested possibly changing to clotrimazole vaginal suppositories  She Mandala needing the fluconazole as above  4  Diabetes mellitus    Counseling Documentation With Imm: The patient, patient's family was counseled regarding diagnostic results, instructions for management, prognosis, risks and benefits of treatment options, importance of compliance with treatment  Medication SE Review and Pt Understands Tx: Possible side effects of new medications were reviewed with the patient/guardian today  The treatment plan was reviewed with the patient/guardian  The patient/guardian understands and agrees with the treatment plan      Future Appointments    Date/Time Provider Specialty Site   10/06/2017 09:30 AM Donnia Osgood, M D  [facility]   08/21/2017 02:00 PM EDGAR Potter   Psychiatry [facility]   08/08/2017 01:00 PM EDGAR Velásquez   Nephrology [address]     Signatures   Electronically signed by : Efrain Lynn MD; Jul 13 2017  4:43PM EST                       (Author)

## 2018-01-16 NOTE — MISCELLANEOUS
Message   Recorded as Task   Date: 06/02/2016 05:06 PM, Created By: Liz Martins   Task Name: Miscellaneous   Assigned To: Nelly Ruiz   Regarding Patient: Tessa Hua, Status: In Progress   Lucilavega Mejia - 02 Jun 2016 5:06 PM     TASK CREATED  BP check with our and MJ BP cuff next week w D ward Gutierrez - 03 Jun 2016 8:29 AM     TASK REASSIGNED: Previously Assigned To NEPHROLOGY Michael Liu - 03 Jun 2016 8:54 AM     TASK IN PROGRESS   Ana Maria Stark - 03 Jun 2016 8:54 AM     TASK EDITED  A message was left for pt to call the office back  An appt was made for 6/17/2016 with Brad Daniel for BP check and BP cuff  [facility] 6/6/2016      Active Problems    1  Abnormal EKG (794 31) (R94 31)   2  Acid reflux disease (530 81) (K21 9)   3  Acute on chronic diastolic congestive heart failure (428 33,428 0) (I50 33)   4  Anemia (285 9) (D64 9)   5  Atrial fibrillation (427 31) (I48 91)   6  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)   7  Benign hypertensive CKD (403 10) (I12 9)   8  Bilateral leg edema (782 3) (R60 0)   9  Bruit of left carotid artery (785 9) (R09 89)   10  Cataract (366 9) (H26 9)   11  Chronic diastolic congestive heart failure (428 32,428 0) (I50 32)   12  Chronic kidney disease, stage 3 (585 3) (N18 3)   13  Cocaine abuse in remission (305 63) (F14 10)   14  Cognitive changes (799 59) (R41 89)   15  Constipation (564 00) (K59 00)   16  Depression with anxiety (300 4) (F41 8)   17  Diabetes mellitus with nephropathy (250 40,583 81) (E11 21)   18  Diabetic Gastropathy (537 9)   19  Diabetic polyneuropathy (250 60,357 2) (E11 42)   20  Diabetic retinopathy (250 50,362 01) (E11 319)   21  Diabetic Ulcer Of The Left Foot (250 80)   22  ROD (dyspnea on exertion) (786 09) (R06 09)   23  Drug-induced Essential Tremor (333 1)   24  Dysuria (788 1) (R30 0)   25  Encephalopathy (348 30) (G93 40)   26  Encounter for screening mammogram for breast cancer (V76 12) (Z12 31)   27  Esophagitis, reflux (530 11) (K21 0)   28  External Hemorrhoids (455 3)   29  FELIPE (generalized anxiety disorder) (300 02) (F41 1)   30  H/O kidney transplant (V42 0) (Z94 0)   31  Heart palpitations (785 1) (R00 2)   32  Hospital discharge follow-up (V67 59) (Z09)   33  Hyperlipidemia (272 4) (E78 5)   34  Hyperplastic colon polyp (211 3) (K63 5)   35  Hypertension (401 9) (I10)   36  Hypothyroidism (244 9) (E03 9)   37  Increased white blood cell count (288 60) (D72 829)   38  Insomnia (780 52) (G47 00)   39  Irritable bowel syndrome (564 1) (K58 9)   40  Major depressive disorder, recurrent episode, in full remission (296 36) (F33 42)   41  Memory loss (780 93) (R41 3)   42  Metabolic acidosis (087 7) (E87 2)   43  MGUS (monoclonal gammopathy of unknown significance) (273 1) (D47 2)   44  Neck pain (723 1) (M54 2)   45  Nonrheumatic aortic valve insufficiency (424 1) (I35 1)   46  Osteopenia (733 90) (M85 80)   47  Osteoporosis (733 00) (M81 0)   48  Peripheral vascular disease (443 9) (I73 9)   49  Post traumatic stress disorder (309 81) (F43 10)   50  Proteinuria (791 0) (R80 9)   51  Rectal polyp (569 0) (K62 1)   52  Restless legs syndrome (333 94) (G25 81)   53  Secondary hyperparathyroidism, renal (588 81) (N25 81)   54  Sinus Tachycardia (427 89)   55  SOB (shortness of breath) (786 05) (R06 02)   56  Tremor (781 0) (R25 1)   57  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 (958 01) (D28 23)   58  Unsteady gait (781 2) (R26 81)   59  Urinary tract infection (599 0) (N39 0)   60  UTI (urinary tract infection), bacterial (599 0,041 9) (N39 0,A49 9)   61  Vaginal itching (698 1) (L29 8)    Current Meds   1  AmLODIPine Besylate 5 MG Oral Tablet; TAKE 1 TABLET TWICE DAILY; Therapy: 92GVO6621 to (Evaluate:66Hid3312)  Requested for: 47JXE9973 Recorded   2  ARIPiprazole 20 MG Oral Tablet; TAKE 1 TABLET AT BEDTIME;    Therapy: 56ISZ9294 to (Evaluate:76Itj8094)  Requested for: 45EEJ9487; Last   Rx:17Cad5196 Ordered   3  Aspirin 81 MG TABS; TAKE 1 TABLET DAILY; Therapy: 99OXY0396 to (Evaluate:19Jun2016) Recorded   4  Catapres 0 3 MG Oral Tablet (CloNIDine HCl); TAKE 0 3MG BY MOUTH 2 TIMES A DAY  INDICATIONS: 0 3MG IN AM, 0 2MG AT NOON, AND 0 3MG IN PM;   Therapy: 91EIL5574 to (Evaluate:30Apr2017)  Requested for: 97YUM3104 Recorded   5  DULoxetine HCl - 60 MG Oral Capsule Delayed Release Particles (Cymbalta); TAKE 1   CAPSULE TWICE DAILY; Therapy: 37ISY3051 to (Evaluate:09Aug2016)  Requested for: 15FHC6052; Last   Rx:13Hak5637 Ordered   6  Folic Acid 1 MG Oral Tablet; TAKE 1 TABLET DAILY AS DIRECTED; Therapy: 14DPS5113 to (Evaluate:14Sep2016)  Requested for: 82QUY2875; Last   Rx:90Myx8183 Ordered   7  Furosemide 40 MG Oral Tablet; Take one tablet every morning and 1/2 tablet every   evening; Therapy: 18VSR7267 to (Conrad Lindsey)  Requested for: 58JSD6600 Recorded   8  HydrALAZINE HCl - 50 MG Oral Tablet; TAKE 1 TABLET 3 TIMES DAILY; Therapy: 22ZDX1482 to (Evaluate:26Jan2017)  Requested for: 07HXM3913; Last   Rx:17Xfh8781 Ordered   9  Keflex 500 MG Oral Capsule (Cephalexin Monohydrate); TAKE 1 CAPSULE BY MOUTH   EVERY 6 HOURS FOR 9 DAYS; Therapy: (Recorded:63Cgl1252) to Recorded   10  Levothyroxine Sodium 75 MCG Oral Tablet; TAKE 1 TABLET DAILY AS DIRECTED; Therapy: 66EXB0003 to  Requested for: 53TWF3493 Recorded   11  LORazepam 2 MG Oral Tablet; TAKE 1 TABLET TWICE DAILY AS NEEDED; Therapy: 14HSP2684 to (Evaluate:09Aug2016); Last Rx:11Feb2016 Ordered   12  Metoprolol Tartrate 50 MG Oral Tablet; take 1 tablet by mouth twice daily; Therapy: 21YSM3060 to (Evaluate:06Jun2016)  Requested for: 78IEQ1770; Last    Rx:08Mar2016 Ordered   13  OneTouch Ultra Blue In [employer]; TEST TWICE A DAY; Therapy: 71GZT0929 to (Evaluate:34Nwz7078)  Requested for: 25WXI5163; Last    Rx:10Mar2016 Ordered   14  OneTouch UltraSoft Lancets Miscellaneous; test twice daily;     Therapy: 37YKP8932 to (Phillip Santos)  Requested for: 98JSW8510; Last    Rx:09Mar2016 Ordered   15  Pantoprazole Sodium 40 MG Oral Tablet Delayed Release; TAKE 1 TABLET DAILY; Therapy: 03QWK5690 to (Nolan Belia)  Requested for: 43OIO0946; Last    Rx:42Ccq7924 Ordered   16  Pravastatin Sodium 80 MG Oral Tablet; take 1 tablet by mouth every day; Therapy: 83WRK2388 to (Shaina Geneva)  Requested for: 38FBS6223; Last    HM:95ZES1567 Ordered   17  PredniSONE 5 MG Oral Tablet; TAKE ONE AND 1/2 TABLETS BY MOUTH DAILY; Therapy: 68UQN5686 to (Dany Tanvi)  Requested for: 45KUG3431; Last    Rx:37Zwh1560 Ordered   18  Prograf 1 MG Oral Capsule (Tacrolimus); Therapy: (Recorded:98Ylk6804) to Recorded   19  ROPINIRole HCl - 0 25 MG Oral Tablet; take 1 tablet by mouth twice daily; Therapy: 12LAX8870 to (Evaluate:66Zfe9986)  Requested for: 18GMI4210; Last    Rx:22Mar2016 Ordered   20  Sertraline HCl - 100 MG Oral Tablet; TAKE 2 TABLETS DAILY; Therapy: 37XBC7891 to (Evaluate:41Tms0683)  Requested for: 51RZH5669; Last    Rx:54Whn6559 Ordered   21  Sodium Bicarbonate 650 MG Oral Tablet; Take one tablet daily; Therapy: 68SMD7760 to (Evaluate:16Nfc3361)  Requested for: 74OKR5716 Recorded   22  Sucralfate 1 GM/10ML Oral Suspension; TAKE 10 ML EVERY 12 HOURS DAILY; Therapy: (Recorded:13Biy2560) to Recorded   23  TraZODone HCl - 150 MG Oral Tablet; TAKE 1 TABLET AT BEDTIME; Therapy: 57XPO1946 to (Evaluate:78Mmr8998)  Requested for: 26ZNK9289; Last    Rx:79Vbl0554 Ordered   24  Tylenol 325 MG Oral Tablet; Therapy: (Recorded:39Lkw3332) to Recorded   25  Vitamin D3 3000 UNIT Oral Tablet; 1 TAB DAILY; Therapy: 71VXC6201 to (Evaluate:87Qzb4885) Recorded    Allergies    1  Penicillins   2  Morphine Derivatives   3   [place]    Signatures   Electronically signed by : EDGAR Lutz ; Jun 7 2016 12:12PM EST

## 2018-01-16 NOTE — RESULT NOTES
Verified Results  (1)  TACROLIMUS [id number] 06:32AM [facility]     Test Name Result Flag Reference    4 2 ng/mL  2 0 - 20 0   Trough (immediately following                  transplant)                       15 0          Trough (steady state, 2 weeks or                  more after transplant):      3 0 - 8 0                                   Detection Limit = 1 0          Performed by LC-MS/MS technology  Performed at:  [address], [address] [place] [address]  : Flor Briggs MD, Phone:  [phone]     (1) URINALYSIS w URINE C/S REFLEX (will reflex a microscopy if leukocytes, occult blood, or nitrites are not within normal limits) [id number] 06:40AM [facility]     Test Name Result Flag Reference   COLOR Yellow     CLARITY Cloudy     PH UA 6 5  4 5-8 0   LEUKOCYTE ESTERASE UA Large A Negative   NITRITE UA Positive A Negative   PROTEIN  (2+) mg/dl A Negative   GLUCOSE UA Negative mg/dl  Negative   KETONES UA Negative mg/dl  Negative   UROBILINOGEN UA 0 2 E U /dl  0 2, 1 0 E U /dl   BILIRUBIN UA Negative  Negative   BLOOD UA Small A Negative   SPECIFIC GRAVITY UA 1 015  1 003-1 030   BACTERIA Occasional /hpf  None Seen, Occasional   EPITHELIAL CELLS Occasional /hpf  None Seen, Occasional   OTHER OBSERVATIONS WBCs Clumped     RBC UA None Seen /hpf  None Seen   WBC UA 2-4 /hpf A None Seen   MUCOUS THREADS Occasional  Occasional, Moderate, Innumerable     (1)  TACROLIMUS [id number] 06:40AM [facility]   TW Order Number: [id number]     Test Name Result Flag Reference    7 4 ng/mL  2 0 - 20 0   Trough (immediately following                  transplant)                       15 0          Trough (steady state, 2 weeks or                  more after transplant):      3 0 - 8 0                                   Detection Limit = 1 0          Performed by LC-MS/MS technology      Performed at:  [address] [facility] , [place], [place]  [id number]  : Josie Baxter MD, Phone:  [phone]     (1) PHOSPHORUS [id number] 06:40AM [facility]     Test Name Result Flag Reference   PHOSPHORUS 3 4 mg/dL  2 7-4 5     (1) BASIC METABOLIC PROFILE [id number] 06:40AM [facility]     Test Name Result Flag Reference   SODIUM 139 mmol/L  136-145   POTASSIUM 3 8 mmol/L  3 5-5 3   CHLORIDE 111 mmol/L H 100-108   CARBON DIOXIDE 22 mmol/L  21-32   ANION GAP (CALC) 6 mmol/L  4-13   BLOOD UREA NITROGEN 33 mg/dL H 5-25   CREATININE 1 47 mg/dL H 0 60-1 30   Standardized to IDMS reference method   CALCIUM 9 7 mg/dL  8 3-10 1   eGFR Non-African American 37 2 ml/min/1 73sq m     National Kidney Disease Education Program recommendations are as follows:  GFR calculation is accurate only with a steady state creatinine  Chronic Kidney disease less than 60 ml/min/1 73 sq  meters  Kidney failure less than 15 ml/min/1 73 sq  meters     GLUCOSE FASTING 123 mg/dL H 65-99     (1) CBC/ PLT (NO DIFF) [id number] 06:40AM [facility]     Test Name Result Flag Reference   HEMATOCRIT 38 0 %  34 8-46 1   HEMOGLOBIN 12 6 g/dL  11 5-15 4   MCHC 33 2 g/dL  31 4-37 4   MCH 29 1 pg  26 8-34 3   MCV 88 fL  82-98   PLATELET COUNT 133 Thousands/uL  149-390   RBC COUNT 4 33 Million/uL  3 81-5 12   RDW 18 8 % H 11 6-15 1   WBC COUNT 11 12 Thousand/uL H 4 31-10 16     (1) URINE PROTEIN CREATININE RATIO [id number] 06:40AM [facility]     Test Name Result Flag Reference   CREATININE URINE 76 1 mg/dL     URINE PROTEIN:CREATININE RATIO 2 55 H 0 00-0 10   URINE PROTEIN 194 mg/dL       (1) PTH N-TERMINAL (INTACT) [id number] 06:40AM [facility]     Test Name Result Flag Reference   PARATHYROID HORMONE INTACT 10 8 pg/mL L 14 0-72 0     (1) VITAMIN D 25-HYDROXY [id number] 06:40AM [facility]     Test Name Result Flag Reference   VIT D 25-HYDROX 40 8 ng/mL  30 0-100 0   This assay is a certified procedure of the CDC Vitamin D Standardization Certification Program (VDSCP)     Deficiency <20ng/ml   Insufficiency 20-30ng/ml   Sufficient  ng/ml     *Patients undergoing fluorescein dye angiography may retain small amounts of fluorescein in the body for 48-72 hours post procedure  Samples containing fluorescein can produce falsely elevated Vitamin D values  If the patient had this procedure, a specimen should be resubmitted post fluorescein clearance       (1) MIGEL BARR VIRUS [id number] 06:40AM [facility]     Test Name Result Flag Reference   EBV EARLY ANTIGEN AB, IGG <9 0 U/mL  0 0 - 8 9   Negative        < 9 0                                   Equivocal  9 0 - 10 9                                   Positive        >10 9   MIGEL-BARR VCA IGG >600 0 U/mL H 0 0 - 17 9   Negative        <18 0                                   Equivocal 18 0 - 21 9                                   Positive        >21 9   MIGEL-BARR VCA IGM <36 0 U/mL  0 0 - 35 9   Negative        <36 0                                   Equivocal 36 0 - 43 9                                   Positive        >43 9   MIGEL-HUNT NUCLEAR ANTIGEN AB IGG 27 1 U/mL H 0 0 - 17 9   Negative        <18 0                                   Equivocal 18 0 - 21 9                                   Positive        >21 9   EBV INTERPRETATION Comment     EBV Interpretation Chart  Interpretation   EBV-IgM  EA(D)-IgG  VCA-IgG  EBNA-IgG  EBV Seronegative    -        -         -          -  Early Phase         +        -         -          -  Acute Primary       +       +or-       +          -  Infection  Convalescence/Past  -       +or-       +          +  Infection  Reactivated        +or-      +         +          +  Infection         + Antibody Present      - Antibody Absent  Performed at:  [address]  [id number]  : Debo Rasmussen MD, Phone:  [phone]     (1) BK VIRUS QUANT PCR, BLOOD [id number] 06:40AM [facility] Test Name Result Flag Reference   BK QUANTITATION PCR Negative copies/mL  Negative   No BK DNA detected  The quantitative range of this assay is 200 to 10 million copies/mL  This test was developed and its performance characteristics determined  by [facility]   It has not been cleared or approved by the Food and Drug  Administration  The FDA has determined that such clearance or  approval is not necessary     LOG10 BK QN PCR COMMENT cob78airp/mL     Unable to calculate result since non-numeric result obtained for  component test   Performed at:  [address]  : Tono Alejandro MD, Phone:  [phone]     (1) URINALYSIS (will reflex a microscopy if leukocytes, occult blood, protein or nitrites are not within normal limits) [id number] 06:57AM [facility]     Test Name Result Flag Reference   COLOR Yellow     CLARITY Cloudy     SPECIFIC GRAVITY UA 1 012  1 003-1 030   PH UA 7 5  4 5-8 0   LEUKOCYTE ESTERASE UA Large A Negative   NITRITE UA Positive A Negative   PROTEIN UA 30 (1+) mg/dl A Negative   GLUCOSE UA Negative mg/dl  Negative   KETONES UA Negative mg/dl  Negative   UROBILINOGEN UA 0 2 E U /dl  0 2, 1 0 E U /dl   BILIRUBIN UA Negative  Negative   BLOOD UA Negative  Negative   BACTERIA Moderate /hpf A None Seen, Occasional   EPITHELIAL CELLS None Seen /hpf  None Seen, Occasional   RBC UA None Seen /hpf  None Seen   WBC UA 2-4 /hpf A None Seen     (1) URINE PROTEIN CREATININE RATIO [id number] 06:57AM [facility]     Test Name Result Flag Reference   CREATININE URINE 39 9 mg/dL     URINE PROTEIN:CREATININE RATIO 2 48 H 0 00-0 10   URINE PROTEIN 99 mg/dL

## 2018-01-16 NOTE — MISCELLANEOUS
Message   Recorded as Task   Date: 10/14/2016 03:14 PM, Created By: Mary Perez   Task Name: Miscellaneous   Assigned To: Lisa Mccarty   Regarding Patient: Geraldo Granados, Status: In Progress   CommentDaniella Martinez - 14 Oct 2016 3:14 PM     TASK CREATED  re: BP log    Boom Bowser no changes for now  Needs f/u stacey't and needs to bring in BP cuff with next visit   Ana Maria Stark - 18 Oct 2016 10:18 AM     TASK IN PROGRESS   Ana Maria Stark - 18 Oct 2016 10:21 AM     TASK EDITED  Left may for pt to call the office, [facility] 10/18/2016   I spoke with [facility] and informed her that based on her recent BP log NO changes to her medications will be made at this time  Re: f/u stacey't I am still working with the pt to find a slot that fits her schedule  [facility] 10/18/2016      Active Problems    1  Abnormal EKG (794 31) (R94 31)   2  Acid reflux disease (530 81) (K21 9)   3  Allergic urticaria (708 0) (L50 0)   4  Anemia (285 9) (D64 9)   5  Antibiotic prophylaxis for dental procedure indicated due to prior joint replacement   (V58 62) (Z79 2)   6  Atrial fibrillation (427 31) (I48 91)   7  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)   8  Benign hypertensive CKD (403 10) (I12 9)   9  Bilateral leg edema (782 3) (R60 0)   10  Bruit of left carotid artery (785 9) (R09 89)   11  Cataract (366 9) (H26 9)   12  Chronic diastolic congestive heart failure (428 32,428 0) (I50 32)   13  Chronic kidney disease, stage 3 (585 3) (N18 3)   14  Cocaine abuse in remission (305 63) (F14 10)   15  Cognitive changes (799 59) (R41 89)   16  Constipation (564 00) (K59 00)   17  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, without long-term    current use of insulin (250 61) (E10 49)   18  Depression with anxiety (300 4) (F41 8)   19  Diabetes mellitus with nephropathy (250 40,583 81) (E11 21)   20  Diabetic Gastropathy (537 9)   21  Diabetic polyneuropathy (250 60,357 2) (E11 42)   22  Diabetic retinopathy (250 50,362 01) (E11 319)   23  Diabetic Ulcer Of The Left Foot (250 80)   24  ROD (dyspnea on exertion) (786 09) (R06 09)   25  Drug-induced Essential Tremor (333 1)   26  Dysuria (788 1) (R30 0)   27  Encephalopathy (348 30) (G93 40)   28  Encounter for screening mammogram for breast cancer (V76 12) (Z12 31)   29  Esophagitis, reflux (530 11) (K21 0)   30  External Hemorrhoids (455 3)   31  FELIPE (generalized anxiety disorder) (300 02) (F41 1)   32  H/O kidney transplant (V42 0) (Z94 0)   33  Heart palpitations (785 1) (R00 2)   34  Hospital discharge follow-up (V67 59) (Z09)   35  Hyperlipidemia (272 4) (E78 5)   36  Hyperplastic colon polyp (211 3) (K63 5)   37  Hypertension (401 9) (I10)   38  Hypothyroidism (244 9) (E03 9)   39  Increased frequency of urination (788 41) (R35 0)   40  Increased white blood cell count (288 60) (D72 829)   41  Insomnia (780 52) (G47 00)   42  Irritable bowel syndrome (564 1) (K58 9)   43  Major depressive disorder, recurrent episode, in full remission (296 36) (F33 42)   44  Memory loss (780 93) (R41 3)   45  Memory loss or impairment (780 93) (R41 3)   46  Metabolic acidosis (357 5) (E87 2)   47  MGUS (monoclonal gammopathy of unknown significance) (273 1) (D47 2)   48  Neck pain (723 1) (M54 2)   49  Need for influenza vaccination (V04 81) (Z23)   50  Nonrheumatic aortic valve insufficiency (424 1) (I35 1)   51  OAB (overactive bladder) (596 51) (N32 81)   52  Osteopenia (733 90) (M85 80)   53  Osteoporosis (733 00) (M81 0)   54  Other calculus in bladder (594 1) (N21 0)   55  Peripheral vascular disease (443 9) (I73 9)   56  Post traumatic stress disorder (309 81) (F43 10)   57  Preop general physical exam (V72 83) (Z01 818)   58  Preoperative cardiovascular examination (V72 81) (Z01 810)   59  Proteinuria (791 0) (R80 9)   60  Rectal polyp (569 0) (K62 1)   61  Recurrent UTI (599 0) (N39 0)   62  Restless legs syndrome (333 94) (G25 81)   63  Secondary hyperparathyroidism, renal (588 81) (N25 81)   64   Sinus Tachycardia (427 89)   65  SOB (shortness of breath) (786 05) (R06 02)   66  Tremor (781 0) (R25 1)   67  Unsteady gait (781 2) (R26 81)    Current Meds   1  AmLODIPine Besylate 10 MG Oral Tablet; take 1/2 tab  BID; Therapy: 82LLX9829 to (Evaluate:30Apr2017)  Requested for: 29NCU1499 Recorded   2  ARIPiprazole 20 MG Oral Tablet; TAKE 1 TABLET AT BEDTIME; Therapy: 96ZTH9403 to (Evaluate:13Uwt4556)  Requested for: 84OYW2918; Last   Rx:39Upp6970 Ordered   3  Aspirin 81 MG Oral Tablet Chewable; Therapy: (Recorded:07Oct2016) to Recorded   4  Aspirin 81 MG TABS; TAKE 1 TABLET DAILY; Therapy: 79CEM5158 to (Evaluate:19Jun2016) Recorded   5  Caltrate 600 TABS; TAKE 1 TABLET TWICE DAILY; Therapy: (Recorded:17Jun2016) to Recorded   6  Catapres 0 1 MG Oral Tablet (CloNIDine HCl); take 3 tab  in am, 2 tab at noon, 3 tab  at   night; Therapy: 08EKT3304 to  Requested for: 32KMN6311 Recorded   7  Doxazosin Mesylate 1 MG Oral Tablet; TAKE 1 TABLET AT BEDTIME; Therapy: (Recorded:17Jun2016) to Recorded   8  DULoxetine HCl - 60 MG Oral Capsule Delayed Release Particles (Cymbalta); TAKE 1   CAPSULE TWICE DAILY; Therapy: 55VDD2384 to (Evaluate:09Aug2016)  Requested for: 41NKW4031; Last   Rx:44Afi2501 Ordered   9  Folic Acid 1 MG Oral Tablet; TAKE 1 TABLET DAILY AS DIRECTED; Therapy: 77TUV4028 to (Evaluate:07Oct2017)  Requested for: 68ZJK2166; Last   Rx:12Oct2016 Ordered   10  Furosemide 40 MG Oral Tablet; TAKE 1 TO 2 TABLETS DAILY AS NEEDED FOR    EDEMA; Therapy: 02ALJ7206 to (Evaluate:25Jun2017)  Requested for: 55YYP5699; Last    Rx:30Jun2016 Ordered   11  HydrALAZINE HCl - 50 MG Oral Tablet; TAKE 1 TABLET 3 TIMES DAILY; Therapy: 30DQJ3780 to (Evaluate:26Jan2017)  Requested for: 38NRW7055; Last    Rx:09Mkv6443 Ordered   12  Hydrocodone-Acetaminophen 5-325 MG Oral Tablet; Therapy: (Recorded:07Oct2016) to Recorded   13  Keflex 500 MG Oral Capsule (Cephalexin); Therapy: (Recorded:07Oct2016) to Recorded   14  Lactulose 10 GM/15ML Oral Solution; TAKE 30 ML DAILY; Therapy: (Recorded:17Jun2016) to Recorded   15  Levothyroxine Sodium 88 MCG Oral Tablet; take 1 tablet by mouth daily; Therapy: 78DJZ7750 to ([phone] 27 75)  Requested for: 05Wve4357; Last    Rx:07Sep2016 Ordered   16  LORazepam 2 MG Oral Tablet; TAKE 1 TABLET TWICE DAILY AS NEEDED; Therapy: 64VZR3009 to (Evaluate:15Jan2017)  Requested for: 37OXL0375; Last    Rx:17Oct2016; Status: ACTIVE - Renewal Denied Ordered   17  Magnesium 200 MG Oral Tablet; Therapy: 53XYW2624 to Recorded   18  Magnesium TABS; Therapy: (Recorded:07Oct2016) to Recorded   19  Metoprolol Tartrate 50 MG Oral Tablet; take 1 tablet by mouth twice daily; Therapy: 84DZA4223 to (Evaluate:09Jun2017)  Requested for: 31YVA3074; Last    Rx:14Jun2016 Ordered   20  OneTouch Ultra Blue In [employer]; TEST TWICE A DAY; Therapy: 76SOG0923 to (Evaluate:20Psq5631)  Requested for: 25PIX9961; Last    Rx:10Mar2016 Ordered   21  OneTouch UltraSoft Lancets Miscellaneous; test twice daily; Therapy: 09MCQ4719 to (Dyan Srinivasan)  Requested for: 01PYN6127; Last    Rx:09Mar2016 Ordered   22  Pantoprazole Sodium 40 MG Oral Tablet Delayed Release; take 1 tablet by mouth every    day; Therapy: 11RWV0811 to (Evaluate:76Eix4680)  Requested for: 86CBO2079; Last    Rx:06Ake5022 Ordered   23  Pravastatin Sodium 80 MG Oral Tablet; take 1 tablet by mouth every day; Therapy: 12QSB9236 to (Michelle Leigh)  Requested for: 95CAL9154; Last    WH:69DWY6014 Ordered   24  PredniSONE 5 MG Oral Tablet; TAKE ONE AND 1/2 TABLETS BY MOUTH DAILY; Therapy: 26QZT5919 to (Girma Pennington)  Requested for: 15UKX2020; Last    Rx:72Atb3377 Ordered   25  Prograf 1 MG Oral Capsule (Tacrolimus); Take 4 in the morning and 3 in the evening; Therapy: (Recorded:75Wmo3576) to Recorded   26  ROPINIRole HCl - 0 25 MG Oral Tablet; take 1 tablet by mouth twice daily;     Therapy: 08Vxu6859 to (Evaluate:58Qse0317)  Requested for: 05BIG4239; Last    Rx:30Jun2016 Ordered   27  Sertraline HCl - 100 MG Oral Tablet; TAKE 2 TABLETS DAILY; Therapy: 41BPR3981 to (Evaluate:15Jan2017)  Requested for: 97XBC3570; Last    Rx:17Oct2016 Ordered   28  Sodium Bicarbonate 650 MG Oral Tablet; Take one tablet daily; Therapy: 51NPL8042 to (Evaluate:12Apr2016)  Requested for: 93WRH4737 Recorded   29  TraZODone HCl - 150 MG Oral Tablet; TAKE 1 TABLET AT BEDTIME; Therapy: 49TSY7273 to (Heikeon Tony)  Requested for: 16Ihq9921; Last    Rx:90Qpt5756 Ordered   30  Vitamin D3 1000 UNIT Oral Capsule; TAKE 3 PILLS AT NOON BY MOUTH; Therapy: (Recorded:17Jun2016) to Recorded    Allergies    1  Penicillins   2  Morphine Derivatives   3  Duricef TABS   4   Myrbetriq NQ41    Signatures   Electronically signed by : EDGAR Sharma ; Oct 18 2016  4:07PM EST

## 2018-01-16 NOTE — RESULT NOTES
Message   Call patient  Hb A1C 6 5 -good blood sugar control  Cholesterol profile, TSH - WNL  Recommend to continue all current medications  Verified Results  (1) LIPID PANEL, FASTING 24Oct2016 06:31AM Golden Aguilera    Order Number: [id number]     Test Name Result Flag Reference   CHOLESTEROL 155 mg/dL     HDL,DIRECT 49 mg/dL  40-60   Specimen collection should occur prior to Metamizole administration due to the potential for falsely depressed results  LDL CHOLESTEROL CALCULATED 82 mg/dL  0-100   - Patient Instructions: This is a fasting blood test  Water,black tea or black  coffee only after 9:00pm the night before test   Drink 2 glasses of water the morning of test     - Patient Instructions: This is a fasting blood test  Water,black tea or black  coffee only after 9:00pm the night before test Drink 2 glasses of water the morning of test - Patient Instructions: This bloodwork is non-fasting  Please drink two glasses of   water morning of bloodwork  Triglyceride:         Normal              <150 mg/dl       Borderline High    150-199 mg/dl       High               200-499 mg/dl       Very High          >499 mg/dl  Cholesterol:         Desirable        <200 mg/dl      Borderline High  200-239 mg/dl      High             >239 mg/dl  HDL Cholesterol:        High    >59 mg/dL      Low     <41 mg/dL  LDL CALCULATED:    This screening LDL is a calculated result  It does not have the accuracy of the Direct Measured LDL in the monitoring of patients with hyperlipidemia and/or statin therapy  Direct Measure LDL (OUK639) must be ordered separately in these patients  TRIGLYCERIDES 119 mg/dL  <=150   Specimen collection should occur prior to N-Acetylcysteine or Metamizole administration due to the potential for falsely depressed results       (1) HEMOGLOBIN A1C 24Oct2016 06:31AM Shaina Quinn Order Number: [id number]     Test Name Result Flag Reference   HEMOGLOBIN A1C 6 5 % H 4 2-6 3   EST  AVG  GLUCOSE 140 mg/dl       (1) TSH 51Gnv8886 06:31AM [facility] Pro   TW Order Number: [id number]     Test Name Result Flag Reference   TSH 1 760 uIU/mL  0 358-3 740   - Patient Instructions: This bloodwork is non-fasting  Please drink two glasses of water morning of bloodwork  - Patient Instructions: This is a fasting blood test  Water,black tea or black  coffee only after 9:00pm the night before test Drink 2 glasses of water the morning of test - Patient Instructions: This bloodwork is non-fasting  Please drink two glasses of   water morning of bloodwork  Patients undergoing fluorescein dye angiography may retain small amounts of fluorescein in the body for 48-72 hours post procedure  Samples containing fluorescein can produce falsely depressed TSH values  If the patient had this procedure,a specimen should be resubmitted post fluorescein clearance            The recommended reference ranges for TSH during pregnancy are as follows:  First trimester 0 1 to 2 5 uIU/mL  Second trimester  0 2 to 3 0 uIU/mL  Third trimester 0 3 to 3 0 uIU/m

## 2018-01-16 NOTE — MISCELLANEOUS
Message     Recorded as Task   Date: 08/28/2017 11:05 AM, Created By: Allegra Sutton   Task Name: Follow Up   Assigned To: Henrik Carty   Regarding Patient: Chuck Salcedo, Status: Active   Comment:    Allegra Sutton - 28 Aug 2017 11:05 AM     TASK CREATED  hello    tac level is appropriate  so if repeat UA returns and suggestive of UTI, will treat as real infection    thank you    jessica          Active Problems    1  Abnormal EKG (794 31) (R94 31)   2  Acid reflux disease (530 81) (K21 9)   3  Acute kidney injury superimposed on CKD (866 00,585 9) (N17 9,N18 9)   4  Acute UTI (599 0) (N39 0)   5  Anemia (285 9) (D64 9)   6  Antibiotic prophylaxis for dental procedure indicated due to prior joint replacement   (V58 62) (Z79 2)   7  Atrial fibrillation (427 31) (I48 91)   8  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)   9  Benign hypertensive CKD (403 10) (I12 9)   10  Bilateral carotid bruits (785 9) (R09 89)   11  Bilateral leg edema (782 3) (R60 0)   12  Bruit of left carotid artery (785 9) (R09 89)   13  Cataract (366 9) (H26 9)   14  Chronic constipation (564 00) (K59 09)   15  Chronic diastolic congestive heart failure (428 32,428 0) (I50 32)   16  Chronic kidney disease, stage 4 (severe) (585 4) (N18 4)   17  Cocaine abuse in remission (305 63) (F14 10)   18  Cognitive changes (799 59) (R41 89)   19  Colon cancer screening (V76 51) (Z12 11)   20  Constipation (564 00) (K59 00)   21  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, without long-term    current use of insulin (250 61) (E10 49)   22  Diabetes mellitus with diabetic nephropathy (250 40,583 81) (E11 21)   23  Diabetic Gastropathy (537 9)   24  Diabetic polyneuropathy (250 60,357 2) (E11 42)   25  Diabetic retinopathy (250 50,362 01) (E11 319)   26  Diabetic Ulcer Of The Left Foot (250 80)   27  Diastolic dysfunction (333 2) (I51 9)   28  ROD (dyspnea on exertion) (786 09) (R06 09)   29   Drug-induced Essential Tremor (333 1) 27  Encounter for screening mammogram for breast cancer (V76 12) (Z12 31)   31  Esophagitis, reflux (530 11) (K21 0)   32  External Hemorrhoids (455 3)   33  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)   34  Fatigue (780 79) (R53 83)   35  FELIPE (generalized anxiety disorder) (300 02) (F41 1)   36  H/O kidney transplant (V42 0) (Z94 0)   37  Heart palpitations (785 1) (R00 2)   38  Hospital discharge follow-up (V67 59) (Z09)   39  Hyperlipidemia (272 4) (E78 5)   40  Hyperplastic colon polyp (211 3) (K63 5)   41  Hypertension (401 9) (I10)   42  Hyponatremia (276 1) (E87 1)   43  Hypothyroidism (244 9) (E03 9)   44  Increased frequency of urination (788 41) (R35 0)   45  Increased white blood cell count (288 60) (D72 829)   46  Insomnia (780 52) (G47 00)   47  Irritable bowel syndrome (564 1) (K58 9)   48  Major depressive disorder, recurrent episode, in full remission (296 36) (F33 42)   49  Memory loss (780 93) (R41 3)   50  Memory loss or impairment (780 93) (R41 3)   51  Metabolic acidosis (553 7) (E87 2)   52  MGUS (monoclonal gammopathy of unknown significance) (273 1) (D47 2)   53  Need for influenza vaccination (V04 81) (Z23)   54  Nonrheumatic aortic valve insufficiency (424 1) (I35 1)   55  OAB (overactive bladder) (596 51) (N32 81)   56  Osteopenia (733 90) (M85 80)   57  Osteoporosis (733 00) (M81 0)   58  Other calculus in bladder (594 1) (N21 0)   59  Peripheral vascular disease (443 9) (I73 9)   60  Post traumatic stress disorder (309 81) (F43 10)   61  Preop general physical exam (V72 83) (Z01 818)   62  Preoperative cardiovascular examination (V72 81) (Z01 810)   63  Proteinuria (791 0) (R80 9)   64  Pyelonephritis (590 80) (N12)   65  Rectal polyp (569 0) (K62 1)   66  Recurrent UTI (599 0) (N39 0)   67  Restless legs syndrome (333 94) (G25 81)   68  Secondary hyperparathyroidism, renal (588 81) (N25 81)   69  Snoring (786 09) (R06 83)   70  SOB (shortness of breath) (786 05) (R06 02)   71  Status post amputation of right great toe (V49 71) (Z89 411)   72  Status post pancreas transplantation (V42 83) (Z94 83)   73  Status post transmetatarsal amputation of left foot (V49 73) (Z89 432)   74  Tremor (781 0) (R25 1)   75  Unsteady gait (781 2) (R26 81)   76  Weakness (780 79) (R53 1)    Current Meds   1  Acetaminophen 325 MG Oral Tablet; TAKE 1 TO 2 TABLETS EVERY 6 HOURS AS   NEEDED Recorded   2  AmLODIPine Besylate 10 MG Oral Tablet; TAKE 1 TABLET BY MOUTH EVERY   MORNING AND 1/2 TABLET BY MOUTH EVERY EVENING; Therapy: 08ZPQ7122 to (Marcelo Resendiz)  Requested for: 11Aug2017; Last   Rx:11Aug2017 Ordered   3  ARIPiprazole 20 MG Oral Tablet; Therapy: 52IUK6060 to  Requested for: 08Aug2017 Recorded   4  Aspirin 81 MG TABS; TAKE 1 TABLET DAILY; Therapy: 19UIB1017 to (Evaluate:19Jun2016) Recorded   5  BD Pen Needle Mary U/F 32G X 4 MM Miscellaneous; Use 4x daily; Therapy: 60YJZ8316 to (Evaluate:11Aug2018)  Requested for: 36HDR3678; Last   Rx:68Plo4072 Ordered   6  Catapres 0 1 MG Oral Tablet (CloNIDine HCl); take 3 tab  in am, 2 tab at noon, 3 tab  at   night; Therapy: 19NXA3917 to (Last Rx:90Rkr9065)  Requested for: 24Qes4846 Ordered   7  Clindamycin HCl - 300 MG Oral Capsule; take 2 caps  1 hr  prior to dental procedure; Therapy: 37GRC1860 to (Last Rx:13Rer2997)  Requested for: 01Csm7926 Ordered   8  Doxazosin Mesylate 1 MG Oral Tablet; TAKE 1 TABLET Bedtime  Requested for:   54YZY2295; Last Rx:93Xem5256 Ordered   9  DULoxetine HCl - 60 MG Oral Capsule Delayed Release Particles (Cymbalta); TAKE 1   CAPSULE TWICE DAILY; Therapy: 87DDH7288 to (Evaluate:66Dqo6408)  Requested for: 87ZTE4282; Last   Rx:02Mar2017 Ordered   10  Folic Acid 1 MG Oral Tablet; TAKE 1 TABLET DAILY AS DIRECTED; Therapy: 88CKR1311 to (Evaluate:07Oct2017)  Requested for: 71TUR5567; Last    Rx:12Oct2016 Ordered   11  Furosemide 20 MG Oral Tablet; TAKE 1 TABLET DAILY;     Therapy: 08Aug2017 to (Evaluate:03Aug2018) Recorded   12  HumaLOG KwikPen 100 UNIT/ML Subcutaneous Solution Pen-injector; INJECT 8 UNIT    3 times daily; Therapy: 56LUC5465 to (Evaluate:27Ohp1900)  Requested for: 10CTY8070; Last    Rx:61Svz1665 Ordered   13  HydrALAZINE HCl - 50 MG Oral Tablet; TAKE 1 TABLET 3 TIMES DAILY; Therapy: 81YQE3622 to (Rach Pineda)  Requested for: 56ODJ2009; Last    Rx:13Mar2017 Ordered   14  Lactulose 10 GM/15ML Oral Solution; TAKE 30 ML DAILY; Therapy: 51SWU0891 to (Last Rx:02Jun2017)  Requested for: 02Jun2017 Ordered   15  Levothyroxine Sodium 88 MCG Oral Tablet; take 1 tablet by mouth daily; Therapy: 19SBF6504 to (Evaluate:65Ijj4294)  Requested for: 42Tud5907 Recorded   16  LORazepam 2 MG Oral Tablet; Take 1 tablet 2 times daily as needed; Therapy: 71ISZ3165 to (Evaluate:86Bcv5740)  Requested for: 59USO4118 Recorded   17  Metoprolol Tartrate 50 MG Oral Tablet; take 1 tablet by mouth twice daily; Therapy: 27DSO9146 to (Evaluate:75Hia6353)  Requested for: 27Jun2017; Last    GL:04TKK7412 Ordered   18  OneTouch Ultra Blue In [employer]; test twice daily; Therapy: 65ASN7506 to (Evaluate:66Vtr1280)  Requested for: 38NDL5615; Last    Rx:11Aug2017 Ordered   19  OneTouch UltraSoft Lancets Miscellaneous; test twice daily; Therapy: 05GRQ2242 to (Evaluate:72Mql5619)  Requested for: 49Ewv8883 Recorded   20  Pravastatin Sodium 80 MG Oral Tablet; take 1 tablet by mouth every day; Therapy: 98PAN8099 to (Evaluate:08Mjn4138)  Requested for: 25OEF2945; Last    Rx:22Jan2017 Ordered   21  PredniSONE 5 MG Oral Tablet; TAKE 1 1/2 TABLETS BY MOUTH EVERY DAY; Therapy: 76HNB9961 to (Evaluate:68Khp1744)  Requested for: 48OMB0033; Last    Rx:06Jun2017 Ordered   22  ROPINIRole HCl - 0 25 MG Oral Tablet; take 1 tablet by mouth twice daily; Therapy: 20GNP1512 to (Duke Vega)  Requested for: 23Rsy9202; Last    Rx:15Jmt4861 Ordered   23   Sertraline HCl - 100 MG Oral Tablet; TAKE 2 TABLET BY MOUTH DAILY; Therapy: 39OIR3937 to ([phone])  Requested for: 57MOW4930; Last    Rx:01Nhc8130 Ordered   24  Sodium Bicarbonate 650 MG Oral Tablet; Take one tablet daily; Therapy: 77XUI1740 to (Evaluate:12Apr2016)  Requested for: 61FEN9521 Recorded   25  Tacrolimus 1 MG Oral Capsule (Prograf); Take 4 in the morning and 3 in the evening     Requested for: 64Zxh1537; Last Rx:60Xge4611 Ordered   26  Toujeo SoloStar 300 UNIT/ML Subcutaneous Solution Pen-injector; INJECT 25 UNIT    Bedtime; Therapy: 44VXP7756 to (Evaluate:28Tvy9783)  Requested for: 15MZM2193; Last    Rx:80Xdw3460 Ordered   27  TraZODone HCl - 150 MG Oral Tablet; TAKE 1 TABLET AT BEDTIME; Therapy: 83KDK2270 to (Lauren Villanueva)  Requested for: 05Fau5635; Last    Rx:56Oev8966 Ordered   28  Vitamin D3 1000 UNIT Oral Capsule; TAKE 3 PILLS AT NOON BY MOUTH; Therapy: (Recorded:17Jun2016) to Recorded    Allergies    1  Penicillins   2  Morphine Derivatives   3  Duricef TABS   4   Myrbetriq TB24    Signatures   Electronically signed by : Yasmin Thomas, ; Aug 28 2017  5:19PM EST                       (Author)

## 2018-01-16 NOTE — MISCELLANEOUS
Message  wrong entry1        1 Amended By: Iliana Sevilla; Sep 14 2017 12:42 PM EST    Active Problems   1  Abnormal EKG (794 31) (R94 31)  2  Acid reflux disease (530 81) (K21 9)  3  Acute kidney injury superimposed on CKD (866 00,585 9) (N17 9,N18 9)  4  Acute UTI (599 0) (N39 0)  5  Anemia (285 9) (D64 9)  6  Antibiotic prophylaxis for dental procedure indicated due to prior joint replacement   (V58 62) (Z79 2)  7  Atrial fibrillation (427 31) (I48 91)  8  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)  9  Benign hypertensive CKD (403 10) (I12 9)  10  Bilateral carotid bruits (785 9) (R09 89)  11  Bilateral leg edema (782 3) (R60 0)  12  Bruit of left carotid artery (785 9) (R09 89)  13  Cataract (366 9) (H26 9)  14  Chronic constipation (564 00) (K59 09)  15  Chronic diastolic congestive heart failure (428 32,428 0) (I50 32)  16  Chronic kidney disease, stage 4 (severe) (585 4) (N18 4)  17  Cocaine abuse in remission (305 63) (F14 10)  18  Cognitive changes (799 59) (R41 89)  19  Colon cancer screening (V76 51) (Z12 11)  20  Constipation (564 00) (K59 00)  21  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, without long-term    current use of insulin (250 61) (E10 49)  22  Diabetes mellitus with diabetic nephropathy (250 40,583 81) (E11 21)  23  Diabetic Gastropathy (537 9)  24  Diabetic polyneuropathy (250 60,357 2) (E11 42)  25  Diabetic retinopathy (250 50,362 01) (E11 319)  26  Diabetic Ulcer Of The Left Foot (250 80)  27  Diastolic dysfunction (427 8) (I51 9)  28  ROD (dyspnea on exertion) (786 09) (R06 09)  29  Drug-induced Essential Tremor (333 1)  30  Encounter for screening mammogram for breast cancer (V76 12) (Z12 31)  31  Esophagitis, reflux (530 11) (K21 0)  32  External Hemorrhoids (455 3)  33  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)  34  Fatigue (780 79) (R53 83)  35  FELIPE (generalized anxiety disorder) (300 02) (F41 1)  36  H/O kidney transplant (V42 0) (Z94 0)  37   Heart palpitations (785 1) (R00 2)  38  Hospital discharge follow-up (V67 59) (Z09)  39  Hyperlipidemia (272 4) (E78 5)  40  Hyperplastic colon polyp (211 3) (K63 5)  41  Hypertension (401 9) (I10)  42  Hyponatremia (276 1) (E87 1)  43  Hypothyroidism (244 9) (E03 9)  44  Increased frequency of urination (788 41) (R35 0)  45  Increased white blood cell count (288 60) (D72 829)  46  Insomnia (780 52) (G47 00)  47  Irritable bowel syndrome (564 1) (K58 9)  48  Major depressive disorder, recurrent episode, in full remission (296 36) (F33 42)  49  Memory loss (780 93) (R41 3)  50  Memory loss or impairment (780 93) (R41 3)  51  Metabolic acidosis (058 5) (E87 2)  52  MGUS (monoclonal gammopathy of unknown significance) (273 1) (D47 2)  53  Need for influenza vaccination (V04 81) (Z23)  54  Nonrheumatic aortic valve insufficiency (424 1) (I35 1)  55  OAB (overactive bladder) (596 51) (N32 81)  56  Osteopenia (733 90) (M85 80)  57  Osteoporosis (733 00) (M81 0)  58  Other calculus in bladder (594 1) (N21 0)  59  Peripheral vascular disease (443 9) (I73 9)  60  Post traumatic stress disorder (309 81) (F43 10)  61  Preop general physical exam (V72 83) (Z01 818)  62  Preoperative cardiovascular examination (V72 81) (Z01 810)  63  Proteinuria (791 0) (R80 9)  64  Pyelonephritis (590 80) (N12)  65  Rectal polyp (569 0) (K62 1)  66  Recurrent UTI (599 0) (N39 0)  67  Restless legs syndrome (333 94) (G25 81)  68  Secondary hyperparathyroidism, renal (588 81) (N25 81)  69  Snoring (786 09) (R06 83)  70  SOB (shortness of breath) (786 05) (R06 02)  71  Status post amputation of right great toe (V49 71) (Z89 411)  72  Status post pancreas transplantation (V42 83) (Z94 83)  73  Status post transmetatarsal amputation of left foot (V49 73) (Z89 432)  74  Tremor (781 0) (R25 1)  75  Unsteady gait (781 2) (R26 81)  76  Weakness (780 79) (R53 1)    Current Meds  1   Acetaminophen 325 MG Oral Tablet; TAKE 1 TO 2 TABLETS EVERY 6 HOURS AS   NEEDED Recorded  2  AmLODIPine Besylate 10 MG Oral Tablet; TAKE 1 TABLET BY MOUTH EVERY   MORNING AND 1/2 TABLET BY MOUTH EVERY EVENING; Therapy: 40TMQ4046 to (Solomon Locker)  Requested for: 11Aug2017; Last   Rx:11Aug2017 Ordered  3  ARIPiprazole 20 MG Oral Tablet; Therapy: 41NWD1458 to  Requested for: 08Aug2017 Recorded  4  Aspirin 81 MG TABS; TAKE 1 TABLET DAILY; Therapy: 34LLU9769 to (Evaluate:19Jun2016) Recorded  5  BD Pen Needle Mary U/F 32G X 4 MM Miscellaneous; Use 4x daily; Therapy: 51USZ2697 to (Evaluate:11Aug2018)  Requested for: 54UEL7663; Last   Rx:70Gbl3263 Ordered  6  Catapres 0 1 MG Oral Tablet (CloNIDine HCl); take 3 tab  in am, 2 tab at noon, 3 tab  at   night; Therapy: 09JXJ1382 to (Last Rx:47Oxg3670)  Requested for: 23Chb2625 Ordered  7  Cephalexin 500 MG Oral Capsule; TAKE 1 CAPSULE TWICE DAILY; Therapy: 95Jdv4371 to (Evaluate:07Sep2017)  Requested for: 84Kiz5044; Last   Rx:56Mqt7098 Ordered  8  Clindamycin HCl - 300 MG Oral Capsule; take 2 caps  1 hr  prior to dental procedure; Therapy: 71XCW6559 to (Last Rx:41Eeg2892)  Requested for: 88Wul4172 Ordered  9  Doxazosin Mesylate 1 MG Oral Tablet; TAKE 1 TABLET Bedtime  Requested for:   66SJM5470; Last Rx:44Vnx4367 Ordered  10  DULoxetine HCl - 60 MG Oral Capsule Delayed Release Particles (Cymbalta); TAKE 1    CAPSULE BY MOUTH TWICE DAILY; Therapy: 22PQW7252 to (Evaluate:45Wwl5745)  Requested for: 69Pfp8210; Last    Rx:45Esp4815 Ordered  11  Folic Acid 1 MG Oral Tablet; TAKE 1 TABLET DAILY AS DIRECTED; Therapy: 75MOA8499 to ()  Requested for: 49VZS3395; Last    Rx:15Fsv8096 Ordered  12  Furosemide 20 MG Oral Tablet; TAKE 1 TABLET DAILY; Therapy: 20Deb4170 to (Evaluate:23Pvz4603) Recorded  13  HumaLOG KwikPen 100 UNIT/ML Subcutaneous Solution Pen-injector; Inject 5 units 3    times daily with meals; Therapy: 40FZP2669 to (Evaluate:00Csk9141)  Requested for: 15Uvk1620 Recorded  14   HydrALAZINE HCl - 50 MG Oral Tablet; TAKE 1 TABLET 3 TIMES DAILY; Therapy: 15AMY0628 to (Tamra Bunch)  Requested for: 18RSU4934; Last    Rx:13Mar2017 Ordered  15  Lactulose 10 GM/15ML Oral Solution; TAKE 30 ML DAILY; Therapy: 47NZQ1915 to (Last Rx:02Jun2017)  Requested for: 02Jun2017 Ordered  16  Levothyroxine Sodium 88 MCG Oral Tablet; take 1 tablet by mouth daily; Therapy: 10JQF7247 to (Evaluate:87Ctj7275)  Requested for: 80Jwp6780 Recorded  17  LORazepam 2 MG Oral Tablet; Take 1 tablet 3 times daily as needed; Therapy: 88SJL4732 to (Evaluate:17Rdw8648)  Requested for: 38Jww6468; Last    Rx:08Sep2017 Ordered  18  Metoprolol Tartrate 50 MG Oral Tablet; take 1 tablet by mouth twice daily; Therapy: 17ZQG5470 to (Evaluate:02Nbo5461)  Requested for: 27Jun2017; Last    XZ:83XQD6343 Ordered  19  OneTouch Ultra Blue In [employer]; Testing 4 times daily as directed by physician; Therapy: 81MLB0500 to (Evaluate:02Jun2018)  Requested for: 15Tvb9172; Last    Rx:32Ebs8083 Ordered  20  OneTouch UltraSoft Lancets Miscellaneous; test twice daily; Therapy: 60IJH8404 to (Evaluate:26Cms3581)  Requested for: 99Yfg8519 Recorded  21  Pravastatin Sodium 80 MG Oral Tablet; take 1 tablet by mouth every day; Therapy: 58YFP3917 to (Evaluate:53Jjh6199)  Requested for: 40JFB4166; Last    Rx:22Jan2017 Ordered  22  PredniSONE 5 MG Oral Tablet; TAKE 1 1/2 TABLETS BY MOUTH EVERY DAY; Therapy: 17KJM1053 to (Evaluate:74Ivz5128)  Requested for: 97SEB4790; Last    Rx:06Jun2017 Ordered  23  ROPINIRole HCl - 0 25 MG Oral Tablet; take 1 tablet by mouth twice daily; Therapy: 56MSA6689 to (Octavio Marques)  Requested for: 78Inc2821; Last    Rx:06Aug2017 Ordered  24  Sertraline HCl - 100 MG Oral Tablet; TAKE 2 TABLET BY MOUTH DAILY; Therapy: 52JCT1871 to (566 324 313)  Requested for: 16BBF2057; Last    Rx:36Efo0095 Ordered  25  Sodium Bicarbonate 650 MG Oral Tablet; Take one tablet daily;     Therapy: 99XQY4280 to (Evaluate:12Apr2016) Requested for: 71YEL6154 Recorded  26  Tacrolimus 1 MG Oral Capsule (Prograf); Take 4 in the morning and 3 in the evening     Requested for: 48Gox5410; Last Rx:54Kds5347 Ordered  27  Toujeo SoloStar 300 UNIT/ML Subcutaneous Solution Pen-injector; INJECT 20 UNIT    Bedtime; Therapy: 81YKA9297 to (Evaluate:74Ati8152)  Requested for: 09Vdz3317 Recorded  28  TraZODone HCl - 150 MG Oral Tablet; TAKE 1 TABLET AT BEDTIME; Therapy: 90KRT4332 to (Charles Tovar)  Requested for: 40Ssl9956; Last    Rx:58Cvp8200 Ordered  29  Vitamin D3 1000 UNIT Oral Capsule; TAKE 3 PILLS AT NOON BY MOUTH; Therapy: (Recorded:89Nzg4277) to Recorded    Allergies   1  Penicillins  2  Morphine Derivatives  3  Duricef TABS  4   Myrbetriq IK40    Signatures   Electronically signed by : EDGAR Dixon ; Sep 14 2017 12:42PM EST                       (Author)

## 2018-01-17 NOTE — MISCELLANEOUS
Message   Recorded as Task   Date: 04/05/2017 08:58 AM, Created By: Micah Young   Task Name: Miscellaneous   Assigned To: Joan Prather   Regarding Patient: Myke Ramirez, Status: In Progress   Comment:    Sundeep Boyle - 05 Apr 2017 8:58 AM     TASK CREATED  based on decreased Hgb:  1) Stool guaiacs x 3 now  2) Venofer 300mg iv x 1 now (please make sure  no active infection at present)  3) CBC and ferritin / iron/iron sat 3 weeks after venofer given   Ana Maria Stark - 05 Apr 2017 9:06 AM     TASK IN PROGRESS   Ana Maria Stark - 05 Apr 2017 12:07 PM     TASK EDITED  Tash Xavier is currently being followed by her PCP in regards to an inner ear infection  Venofer infusion and labs are on HOLD until infection resolves  She is due to follow up with her PCP in 3 weeks  She has been asked to call the office at that time to schedule infusion  Stool guaiacs have been ordered and will be done this week  [facility] 4/5/2017   Tash Park called the office yesterday to inform us that her ear infection has cleared  She has been set up for a Venofer infusion 300 mg iv x 1 for this Friday 4/28  CBC/ferritin/iron/ iron sat have been ordered for 5/19 3 weeks post infusion  Stool guaiacs have been ordered and resulted  [facility] 4/26/2017      Active Problems    1  Abnormal EKG (794 31) (R94 31)   2  Acid reflux disease (530 81) (K21 9)   3  Acute kidney injury superimposed on CKD (866 00,585 9) (N17 9,N18 9)   4  Allergic urticaria (708 0) (L50 0)   5  Anemia (285 9) (D64 9)   6  Antibiotic prophylaxis for dental procedure indicated due to prior joint replacement   (V58 62) (Z79 2)   7  Atrial fibrillation (427 31) (I48 91)   8  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)   9  Benign hypertensive CKD (403 10) (I12 9)   10  Bilateral impacted cerumen (380 4) (H61 23)   11  Bilateral leg edema (782 3) (R60 0)   12  Bruit of left carotid artery (785 9) (R09 89)   13  Cataract (366 9) (H26 9)   14   Chronic diastolic congestive heart failure (428 32,428 0) (I50 32)   15  Chronic kidney disease, stage 4 (severe) (585 4) (N18 4)   16  Cocaine abuse in remission (305 63) (F14 10)   17  Cognitive changes (799 59) (R41 89)   18  Constipation (564 00) (K59 00)   19  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, without long-term    current use of insulin (250 61) (E10 49)   20  Diabetes mellitus with nephropathy (250 40,583 81) (E11 21)   21  Diabetic Gastropathy (537 9)   22  Diabetic polyneuropathy (250 60,357 2) (E11 42)   23  Diabetic retinopathy (250 50,362 01) (E11 319)   24  Diabetic Ulcer Of The Left Foot (250 80)   25  Diastolic dysfunction (545 5) (I51 9)   26  ROD (dyspnea on exertion) (786 09) (R06 09)   27  Drug-induced Essential Tremor (333 1)   28  Dysuria (788 1) (R30 0)   29  Encephalopathy (348 30) (G93 40)   30  Encounter for screening mammogram for breast cancer (V76 12) (Z12 31)   31  Esophagitis, reflux (530 11) (K21 0)   32  External Hemorrhoids (455 3)   33  Fatigue (780 79) (R53 83)   34  FELIPE (generalized anxiety disorder) (300 02) (F41 1)   35  H/O kidney transplant (V42 0) (Z94 0)   36  Heart palpitations (785 1) (R00 2)   37  Hospital discharge follow-up (V67 59) (Z09)   38  Hyperlipidemia (272 4) (E78 5)   39  Hyperplastic colon polyp (211 3) (K63 5)   40  Hypertension (401 9) (I10)   41  Hyponatremia (276 1) (E87 1)   42  Hypothyroidism (244 9) (E03 9)   43  Increased frequency of urination (788 41) (R35 0)   44  Increased white blood cell count (288 60) (D72 829)   45  Insomnia (780 52) (G47 00)   46  Irritable bowel syndrome (564 1) (K58 9)   47  Major depressive disorder, recurrent episode, in full remission (296 36) (F33 42)   48  Memory loss (780 93) (R41 3)   49  Memory loss or impairment (780 93) (R41 3)   50  Metabolic acidosis (129 2) (E87 2)   51  MGUS (monoclonal gammopathy of unknown significance) (273 1) (D47 2)   52  Neck pain (723 1) (M54 2)   53   Need for influenza vaccination (V04 81) (Z23) 54  Nonrheumatic aortic valve insufficiency (424 1) (I35 1)   55  OAB (overactive bladder) (596 51) (N32 81)   56  Osteopenia (733 90) (M85 80)   57  Osteoporosis (733 00) (M81 0)   58  Other calculus in bladder (594 1) (N21 0)   59  Peripheral vascular disease (443 9) (I73 9)   60  Post traumatic stress disorder (309 81) (F43 10)   61  Preop general physical exam (V72 83) (Z01 818)   62  Preoperative cardiovascular examination (V72 81) (Z01 810)   63  Proteinuria (791 0) (R80 9)   64  Rectal polyp (569 0) (K62 1)   65  Recurrent UTI (599 0) (N39 0)   66  Restless legs syndrome (333 94) (G25 81)   67  Secondary hyperparathyroidism, renal (588 81) (N25 81)   68  Sinus Tachycardia (427 89)   69  Snoring (786 09) (R06 83)   70  SOB (shortness of breath) (786 05) (R06 02)   71  Tremor (781 0) (R25 1)   72  Unsteady gait (781 2) (R26 81)   73  UTI (urinary tract infection) (599 0) (N39 0)    Current Meds   1  AmLODIPine Besylate 10 MG Oral Tablet; take 1/2 tab  BID; Therapy: 18TWR8295 to (Last Rx:07Apr2017)  Requested for: 07Apr2017 Ordered   2  ARIPiprazole 30 MG Oral Tablet; TAKE 1 TABLET AT BEDTIME; Therapy: 04FLZ1670 to (Evaluate:02Luh7940)  Requested for: 20SVU2873; Last   Rx:16Nov2016 Ordered   3  Aspirin 81 MG TABS; TAKE 1 TABLET DAILY; Therapy: 33OUB4707 to (Evaluate:19Jun2016) Recorded   4  Catapres 0 1 MG Oral Tablet (CloNIDine HCl); take 3 tab  in am, 2 tab at noon, 3 tab  at   night; Therapy: 30QJP4657 to (Last Rx:22Mar2017)  Requested for: 23Mar2017 Ordered   5  Clindamycin HCl - 300 MG Oral Capsule; TAKE 2 CAPSULES 1 HOUR PRIOR TO   DENTAL APPOINTMENT; Therapy: 93FCD5185 to (Poppy Saint)  Requested for: 13Apr2017; Last   Rx:07Apr2017 Ordered   6  Doxazosin Mesylate 1 MG Oral Tablet; TAKE 1 TABLET Bedtime  Requested for:   20Apr2017; Last Rx:05Apr2017 Ordered   7  DULoxetine HCl - 60 MG Oral Capsule Delayed Release Particles (Cymbalta); TAKE 1   CAPSULE TWICE DAILY;    Therapy: 55BQC3125 to (Evaluate:97Pgr7143)  Requested for: 59UOR8319; Last   Rx:02Mar2017 Ordered   8  Flonase Allergy Relief 50 MCG/ACT Nasal Suspension (Fluticasone Propionate); USE 2   SPRAYS IN EACH NOSTRIL ONCE DAILY; Therapy: 41TJJ7419 to Recorded   9  Folic Acid 1 MG Oral Tablet; TAKE 1 TABLET DAILY AS DIRECTED; Therapy: 44PVA1913 to (Evaluate:07Oct2017)  Requested for: 45PAV1074; Last   Rx:74Npp8829 Ordered   10  Furosemide 20 MG Oral Tablet; take 1/2 tab  daily; Therapy: 28Iac4486 to Recorded   11  Generlac 10 GM/15ML Oral Solution; TAKE 15 ML TWICE DAILY; Therapy: 70FQR9333 to Recorded   12  HydrALAZINE HCl - 50 MG Oral Tablet; TAKE 1 TABLET 3 TIMES DAILY; Therapy: 56UVE0816 to (Roz Wyatt)  Requested for: 94RRJ1982; Last    Rx:13Mar2017 Ordered   13  Levothyroxine Sodium 88 MCG Oral Tablet; take 1 tablet by mouth daily; Therapy: 37ANA7057 to (Evaluate:15Mar2017)  Requested for: 36Lbz5247; Last    Rx:89Txp1493 Ordered   14  LORazepam 2 MG Oral Tablet; TAKE 1 TABLET 3 TIMES DAILY AS NEEDED; Therapy: 08HLZ4887 to (Evaluate:63Dug3753)  Requested for: 70RYK2177; Last    Rx:16Nov2016 Ordered   15  Magnesium 200 MG Oral Tablet; Therapy: 12IGM4384 to Recorded   16  Metoprolol Tartrate 50 MG Oral Tablet; take 1 tablet by mouth twice daily; Therapy: 35JRQ1050 to (Evaluate:09Jun2017)  Requested for: 33ICN8642; Last    Rx:14Jun2016 Ordered   17  OneTouch Ultra Blue In [employer]; test twice daily; Therapy: 70DCF5473 to (Evaluate:24Mar2018)  Requested for: 45XAV3977; Last    Rx:29Mar2017 Ordered   18  OneTouch UltraSoft Lancets Miscellaneous; test twice daily; Therapy: 12UXX3955 to (Roz Wyatt)  Requested for: 85TQB9952; Last    Rx:09Mar2016 Ordered   19  Pantoprazole Sodium 40 MG Oral Tablet Delayed Release; take 1 tablet by mouth every    day; Therapy: 64RBL4453 to (Evaluate:71Tec6495)  Requested for: 40HBS6695; Last    Rx:14Oje0275 Ordered   20   Pravastatin Sodium 80 MG Oral Tablet; take 1 tablet by mouth every day; Therapy: 07JQF5771 to (Evaluate:37Frl3252)  Requested for: 07XTE6522; Last    Rx:22Jan2017 Ordered   21  PredniSONE 5 MG Oral Tablet; TAKE ONE AND 1/2 TABLETS BY MOUTH DAILY; Therapy: 49QWQ8288 to (Herbertyudy Padilla)  Requested for: 90GKG5896; Last    Rx:24May2016 Ordered   22  ROPINIRole HCl - 0 25 MG Oral Tablet; take 1 tablet by mouth twice daily; Therapy: 51JLO2773 to (Evaluate:18Sqh6421)  Requested for: 56CLG7332; Last    Rx:30Jun2016 Ordered   23  Sertraline HCl - 100 MG Oral Tablet; TAKE 2 TABLETS DAILY; Therapy: 56JMW6644 to (Evaluate:89Idz0009)  Requested for: 41EIO0363; Last    Rx:16Nov2016 Ordered   24  Sodium Bicarbonate 650 MG Oral Tablet; Take one tablet daily; Therapy: 31TAN5082 to (Evaluate:12Apr2016)  Requested for: 36ZFR9004 Recorded   25  Tacrolimus 1 MG Oral Capsule (Prograf); Take 4 in the morning and 4 in the evening     Requested for: 00Jei2918; Last Rx:13Apr2017 Ordered   26  TraZODone HCl - 150 MG Oral Tablet; TAKE 1 TABLET AT BEDTIME; Therapy: 80FIZ2617 to (Evaluate:20Znn7222)  Requested for: 24EZS7387; Last    Rx:02Mar2017 Ordered   27  VESIcare 5 MG Oral Tablet; take one tablet daily; Therapy: 41Aob5758 to Recorded   28  Vitamin D3 1000 UNIT Oral Capsule; TAKE 3 PILLS AT NOON BY MOUTH; Therapy: (Recorded:17Jun2016) to Recorded    Allergies    1  Penicillins   2  Morphine Derivatives   3  Duricef TABS   4   Myrbetriq OJ07    Signatures   Electronically signed by : EGDAR Boo ; Apr 26 2017  2:24PM EST

## 2018-01-17 NOTE — MISCELLANEOUS
Message     Recorded as Task   Date: 08/23/2017 07:49 PM, Created By: Jena Segura   Task Name: Follow Up   Assigned To: Mary Jolly   Regarding Patient: Soledad Felder, Status: Active   Comment:    Jena Colton - 23 Aug 2017 7:49 PM     TASK CREATED  hello    can patient have lab slip for repeat UA      current labs reviewed  UA appears to have infection, but patient is asymptomatic when i talked with her on phone tonight  Cr slightly higher, but tac level not yet returned  if tac is normal, and repeat UA is positive, then will treat as UTI; also can we make sure urine culture is sent on current and new UA      patient will also call with any urinary symptoms  Thank you    jessica   Done        Active Problems    1  Abnormal EKG (794 31) (R94 31)   2  Acid reflux disease (530 81) (K21 9)   3  Acute kidney injury superimposed on CKD (866 00,585 9) (N17 9,N18 9)   4  Acute UTI (599 0) (N39 0)   5  Anemia (285 9) (D64 9)   6  Antibiotic prophylaxis for dental procedure indicated due to prior joint replacement   (V58 62) (Z79 2)   7  Atrial fibrillation (427 31) (I48 91)   8  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)   9  Benign hypertensive CKD (403 10) (I12 9)   10  Bilateral carotid bruits (785 9) (R09 89)   11  Bilateral leg edema (782 3) (R60 0)   12  Bruit of left carotid artery (785 9) (R09 89)   13  Cataract (366 9) (H26 9)   14  Chronic constipation (564 00) (K59 09)   15  Chronic diastolic congestive heart failure (428 32,428 0) (I50 32)   16  Chronic kidney disease, stage 4 (severe) (585 4) (N18 4)   17  Cocaine abuse in remission (305 63) (F14 10)   18  Cognitive changes (799 59) (R41 89)   19  Colon cancer screening (V76 51) (Z12 11)   20  Constipation (564 00) (K59 00)   21  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, without long-term    current use of insulin (250 61) (E10 49)   22  Diabetes mellitus with diabetic nephropathy (250 40,583 81) (E11 21)   23   Diabetic Gastropathy (537 9)   24  Diabetic polyneuropathy (250 60,357 2) (E11 42)   25  Diabetic retinopathy (250 50,362 01) (E11 319)   26  Diabetic Ulcer Of The Left Foot (250 80)   27  Diastolic dysfunction (737 7) (I51 9)   28  ROD (dyspnea on exertion) (786 09) (R06 09)   29  Drug-induced Essential Tremor (333 1)   30  Encounter for screening mammogram for breast cancer (V76 12) (Z12 31)   31  Esophagitis, reflux (530 11) (K21 0)   32  External Hemorrhoids (455 3)   33  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)   34  Fatigue (780 79) (R53 83)   35  FELIPE (generalized anxiety disorder) (300 02) (F41 1)   36  H/O kidney transplant (V42 0) (Z94 0)   37  Heart palpitations (785 1) (R00 2)   38  Hospital discharge follow-up (V67 59) (Z09)   39  Hyperlipidemia (272 4) (E78 5)   40  Hyperplastic colon polyp (211 3) (K63 5)   41  Hypertension (401 9) (I10)   42  Hyponatremia (276 1) (E87 1)   43  Hypothyroidism (244 9) (E03 9)   44  Increased frequency of urination (788 41) (R35 0)   45  Increased white blood cell count (288 60) (D72 829)   46  Insomnia (780 52) (G47 00)   47  Irritable bowel syndrome (564 1) (K58 9)   48  Major depressive disorder, recurrent episode, in full remission (296 36) (F33 42)   49  Memory loss (780 93) (R41 3)   50  Memory loss or impairment (780 93) (R41 3)   51  Metabolic acidosis (803 9) (E87 2)   52  MGUS (monoclonal gammopathy of unknown significance) (273 1) (D47 2)   53  Need for influenza vaccination (V04 81) (Z23)   54  Nonrheumatic aortic valve insufficiency (424 1) (I35 1)   55  OAB (overactive bladder) (596 51) (N32 81)   56  Osteopenia (733 90) (M85 80)   57  Osteoporosis (733 00) (M81 0)   58  Other calculus in bladder (594 1) (N21 0)   59  Peripheral vascular disease (443 9) (I73 9)   60  Post traumatic stress disorder (309 81) (F43 10)   61  Preop general physical exam (V72 83) (Z01 818)   62  Preoperative cardiovascular examination (V72 81) (Z01 810)   63   Proteinuria (791 0) (R80 9)   64  Pyelonephritis (590 80) (N12)   65  Rectal polyp (569 0) (K62 1)   66  Recurrent UTI (599 0) (N39 0)   67  Restless legs syndrome (333 94) (G25 81)   68  Secondary hyperparathyroidism, renal (588 81) (N25 81)   69  Snoring (786 09) (R06 83)   70  SOB (shortness of breath) (786 05) (R06 02)   71  Status post amputation of right great toe (V49 71) (Z89 411)   72  Status post pancreas transplantation (V42 83) (Z94 83)   73  Status post transmetatarsal amputation of left foot (V49 73) (Z89 432)   74  Tremor (781 0) (R25 1)   75  Unsteady gait (781 2) (R26 81)   76  Weakness (780 79) (R53 1)    Current Meds   1  Acetaminophen 325 MG Oral Tablet; TAKE 1 TO 2 TABLETS EVERY 6 HOURS AS   NEEDED Recorded   2  AmLODIPine Besylate 10 MG Oral Tablet; TAKE 1 TABLET BY MOUTH EVERY   MORNING AND 1/2 TABLET BY MOUTH EVERY EVENING; Therapy: 24KGS4971 to (Stephon Sifuentes)  Requested for: 11Aug2017; Last   Rx:11Aug2017 Ordered   3  ARIPiprazole 20 MG Oral Tablet; Therapy: 31ENY4250 to  Requested for: 91Ghg0522 Recorded   4  Aspirin 81 MG TABS; TAKE 1 TABLET DAILY; Therapy: 19XQC7317 to (Evaluate:19Jun2016) Recorded   5  BD Pen Needle Mary U/F 32G X 4 MM Miscellaneous; Use 4x daily; Therapy: 68ZJB8372 to (Evaluate:11Aug2018)  Requested for: 77VXI7822; Last   Rx:10Dmv7451 Ordered   6  Catapres 0 1 MG Oral Tablet (CloNIDine HCl); take 3 tab  in am, 2 tab at noon, 3 tab  at   night; Therapy: 32YZX8573 to (Last Rx:30Lgt8844)  Requested for: 72Kvl5055 Ordered   7  Clindamycin HCl - 300 MG Oral Capsule; take 2 caps  1 hr  prior to dental procedure; Therapy: 36GBP3425 to (Last Rx:08Wtt8553)  Requested for: 73Qil2690 Ordered   8  Doxazosin Mesylate 1 MG Oral Tablet; TAKE 1 TABLET Bedtime  Requested for:   86DTM6807; Last Rx:05Apr2017 Ordered   9  DULoxetine HCl - 60 MG Oral Capsule Delayed Release Particles (Cymbalta); TAKE 1   CAPSULE TWICE DAILY;    Therapy: 78RZE7398 to (Evaluate:93Tpz9465)  Requested for: 91DBB4609; Last   Rx:02Mar2017 Ordered   10  Folic Acid 1 MG Oral Tablet; TAKE 1 TABLET DAILY AS DIRECTED; Therapy: 11CIY2573 to (Evaluate:07Oct2017)  Requested for: 93SHF9773; Last    Rx:12Oct2016 Ordered   11  Furosemide 20 MG Oral Tablet; TAKE 1 TABLET DAILY; Therapy: 84Bbz4093 to (Evaluate:77Kpk0322) Recorded   12  HumaLOG KwikPen 100 UNIT/ML Subcutaneous Solution Pen-injector; INJECT 8 UNIT    3 times daily; Therapy: 62ZMN0050 to (Evaluate:51Ghi5177)  Requested for: 57PAX0978; Last    Rx:26Bck5984 Ordered   13  HydrALAZINE HCl - 50 MG Oral Tablet; TAKE 1 TABLET 3 TIMES DAILY; Therapy: 21QTA1031 to (Ernst Monroy)  Requested for: 20NOA7258; Last    Rx:13Mar2017 Ordered   14  Lactulose 10 GM/15ML Oral Solution; TAKE 30 ML DAILY; Therapy: 22EMF0675 to (Last Rx:02Jun2017)  Requested for: 02Jun2017 Ordered   15  Levothyroxine Sodium 88 MCG Oral Tablet; take 1 tablet by mouth daily; Therapy: 70DRA0323 to (Evaluate:01Zer2333)  Requested for: 05Dtl7794 Recorded   16  LORazepam 2 MG Oral Tablet; Take 1 tablet 2 times daily as needed; Therapy: 03DXP7616 to (Evaluate:80Sah6260)  Requested for: 92QGR2474 Recorded   17  Metoprolol Tartrate 50 MG Oral Tablet; take 1 tablet by mouth twice daily; Therapy: 94MDP3883 to (Evaluate:40Asu4180)  Requested for: 27Jun2017; Last    MO:75ZUJ9769 Ordered   18  OneTouch Ultra Blue In [employer]; test twice daily; Therapy: 99SSD4485 to (Evaluate:53Zzw8605)  Requested for: 87WRT9547; Last    Rx:11Aug2017 Ordered   19  OneTouch UltraSoft Lancets Miscellaneous; test twice daily; Therapy: 38UNN8305 to (Evaluate:81Jki1557)  Requested for: 70Yge3977 Recorded   20  Pravastatin Sodium 80 MG Oral Tablet; take 1 tablet by mouth every day; Therapy: 73RHB4243 to (Evaluate:62Euy4224)  Requested for: 39VMV1301; Last    Rx:22Jan2017 Ordered   21  PredniSONE 5 MG Oral Tablet; TAKE 1 1/2 TABLETS BY MOUTH EVERY DAY;     Therapy: 39RWG7562 to (Evaluate:04Sep2017) Requested for: 69BVM7325; Last    RD:42ETC6872 Ordered   22  ROPINIRole HCl - 0 25 MG Oral Tablet; take 1 tablet by mouth twice daily; Therapy: 69PCG0340 to (Shreyas Bower)  Requested for: 65Cwe5850; Last    Rx:25Msx2539 Ordered   23  Sertraline HCl - 100 MG Oral Tablet; TAKE 2 TABLET BY MOUTH DAILY; Therapy: 04XXB0399 to (77 873 135)  Requested for: 36ZUF1958; Last    Rx:24Zfs0544 Ordered   24  Sodium Bicarbonate 650 MG Oral Tablet; Take one tablet daily; Therapy: 74NNT4279 to (Evaluate:12Apr2016)  Requested for: 33DCV5348 Recorded   25  Tacrolimus 1 MG Oral Capsule (Prograf); Take 4 in the morning and 3 in the evening     Requested for: 64Ron4298; Last Rx:45Ksa7292 Ordered   26  Toujeo SoloStar 300 UNIT/ML Subcutaneous Solution Pen-injector; INJECT 25 UNIT    Bedtime; Therapy: 90XYR9804 to (Evaluate:29Myd1659)  Requested for: 65CYJ2681; Last    Rx:35Wxc9310 Ordered   27  TraZODone HCl - 150 MG Oral Tablet; TAKE 1 TABLET AT BEDTIME; Therapy: 05KRH2435 to (Shreyas Bower)  Requested for: 79Klx7737; Last    Rx:06Xle7214 Ordered   28  Vitamin D3 1000 UNIT Oral Capsule; TAKE 3 PILLS AT NOON BY MOUTH; Therapy: (Recorded:17Jun2016) to Recorded    Allergies    1  Penicillins   2  Morphine Derivatives   3  Duricef TABS   4   Myrbetriq TB24    Signatures   Electronically signed by : Mildred Sood, ; Aug 28 2017 11:52AM EST                       (Author)

## 2018-01-17 NOTE — RESULT NOTES
Message   Call patient  Iron level is 45 ( normal )  Recommended to start OTC Ferrous sulfate 325 mg one tablet daily  TSH level is within normal range  Continue Levothyroxine 88 mcg  daily  If patient desires she can schedule follow-up visit for anemia with hematologist        Verified Results  (1) IRON 60QBI9346 11:30AM [employer] Order Number: [id number]     Test Name Result Flag Reference   IRON 45 ug/dL L    Patients treated with metal-binding drugs (ie  Deferoxamine) may have depressed iron values  - Patient Instructions: This bloodwork is non-fasting  Please drink two glasses of water morning of bloodwork  (1) FERRITIN 25YUS6591 11:30AM Ardia Hood   [facility] Order Number: [id number]     Test Name Result Flag Reference   FERRITIN 35 ng/mL  8-388   - Patient Instructions: This bloodwork is non-fasting  Please drink two glasses of water morning of bloodwork  (1) TSH 70YAE0405 11:30AM Ardia Hood   TW Order Number: [id number]     Test Name Result Flag Reference   TSH 2 410 uIU/mL  0 358-3 740   - Patient Instructions: This bloodwork is non-fasting  Please drink two glasses of water morning of bloodwork  - Patient Instructions: This bloodwork is non-fasting  Please drink two glasses of water morning of bloodwork  Patients undergoing fluorescein dye angiography may retain small amounts of fluorescein in the body for 48-72 hours post procedure  Samples containing fluorescein can produce falsely depressed TSH values  If the patient had this procedure,a specimen should be resubmitted post fluorescein clearance            The recommended reference ranges for TSH during pregnancy are as follows:  First trimester 0 1 to 2 5 uIU/mL  Second trimester  0 2 to 3 0 uIU/mL  Third trimester 0 3 to 3 0 uIU/m

## 2018-01-17 NOTE — MISCELLANEOUS
History of Present Illness  TCM Communication  Luke: The patient is being contacted for follow-up after hospitalization  She was hospitalized at [facility]  The dates of hospitalization: 3 days, date of admission: 7/12/2016, date of discharge: 7/14/2016  Diagnosis: Confusion and generalized pain  She was discharged to home  Medications were not reviewed today  She did not schedule a follow up appointment  She refused a follow up appointment due to patient refused will follow up with Dr Tracy Yañez  The patient is currently asymptomatic  Counseling was provided to the patient  Topics counseled included importance of compliance with treatment  Communication performed and completed by A  [facility] LPN      Active Problems    1  Abnormal EKG (794 31) (R94 31)   2  Acid reflux disease (530 81) (K21 9)   3  Acute on chronic diastolic congestive heart failure (428 33,428 0) (I50 33)   4  Anemia (285 9) (D64 9)   5  Antibiotic prophylaxis for dental procedure indicated due to prior joint replacement   (V58 62) (Z79 2)   6  Atrial fibrillation (427 31) (I48 91)   7  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)   8  Benign hypertensive CKD (403 10) (I12 9)   9  Bilateral leg edema (782 3) (R60 0)   10  Bruit of left carotid artery (785 9) (R09 89)   11  Cataract (366 9) (H26 9)   12  Chronic diastolic congestive heart failure (428 32,428 0) (I50 32)   13  Chronic kidney disease, stage 3 (585 3) (N18 3)   14  Cocaine abuse in remission (305 63) (F14 10)   15  Cognitive changes (799 59) (R41 89)   16  Constipation (564 00) (K59 00)   17  Depression with anxiety (300 4) (F41 8)   18  Diabetes mellitus with nephropathy (250 40,583 81) (E11 21)   19  Diabetic Gastropathy (537 9)   20  Diabetic polyneuropathy (250 60,357 2) (E11 42)   21  Diabetic retinopathy (250 50,362 01) (E11 319)   22  Diabetic Ulcer Of The Left Foot (250 80)   23  ROD (dyspnea on exertion) (786 09) (R06 09)   24  Drug-induced Essential Tremor (333 1)   25  Dysuria (788 1) (R30 0)   26  Encephalopathy (348 30) (G93 40)   27  Encounter for screening mammogram for breast cancer (V76 12) (Z12 31)   28  Esophagitis, reflux (530 11) (K21 0)   29  External Hemorrhoids (455 3)   30  FELIPE (generalized anxiety disorder) (300 02) (F41 1)   31  H/O kidney transplant (V42 0) (Z94 0)   32  Heart palpitations (785 1) (R00 2)   33  Hospital discharge follow-up (V67 59) (Z09)   34  Hyperlipidemia (272 4) (E78 5)   35  Hyperplastic colon polyp (211 3) (K63 5)   36  Hypertension (401 9) (I10)   37  Hypothyroidism (244 9) (E03 9)   38  Increased white blood cell count (288 60) (D72 829)   39  Insomnia (780 52) (G47 00)   40  Irritable bowel syndrome (564 1) (K58 9)   41  Major depressive disorder, recurrent episode, in full remission (296 36) (F33 42)   42  Memory loss (780 93) (R41 3)   43  Metabolic acidosis (518 7) (E87 2)   44  MGUS (monoclonal gammopathy of unknown significance) (273 1) (D47 2)   45  Neck pain (723 1) (M54 2)   46  Nonrheumatic aortic valve insufficiency (424 1) (I35 1)   47  Osteopenia (733 90) (M85 80)   48  Osteoporosis (733 00) (M81 0)   49  Peripheral vascular disease (443 9) (I73 9)   50  Post traumatic stress disorder (309 81) (F43 10)   51  Proteinuria (791 0) (R80 9)   52  Rectal polyp (569 0) (K62 1)   53  Restless legs syndrome (333 94) (G25 81)   54  Secondary hyperparathyroidism, renal (588 81) (N25 81)   55  Sinus Tachycardia (427 89)   56  SOB (shortness of breath) (786 05) (R06 02)   57  Tremor (781 0) (R25 1)   58  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 (175 68) (Z01 67)   59  Unsteady gait (781 2) (R26 81)   60  Urinary tract infection (599 0) (N39 0)   61  UTI (urinary tract infection), bacterial (599 0,041 9) (N39 0,A49 9)   62  Vaginal itching (698 1) (L29 8)    Past Medical History    1  History of Abnormal Liver Function Test (790 6)   2  History of Abnormal urine (791 9) (R82 90)   3   History of Cervical Dysplasia (V13 22)   4  History of Denial Of Any Significant Medical History   5  History of Diabetes mellitus with foot ulcer (250 80,707 15) (E11 621,L97 509)   6  History of cholelithiasis (V12 79) (Z87 19)   7  History of gastritis (V12 79) (Z87 19)   8  Denied: History of head injury   9  History of hematuria (V13 09) (Z87 448)   10  History of hyperkalemia (V12 29) (Z86 39)   11  History of pneumonia (V12 61) (Z87 01)   12  History of seborrhea (V13 3) (Z87 2)   13  History of Hyponatremia (276 1) (E87 1)   14  History of Iliotibial band syndrome (728 89) (M76 30)   15  History of Infected tooth (522 4) (K04 7)   16  History of Lumbar radiculopathy (724 4) (M54 16)   17  Denied: History of Seizures   18  History of Trochanteric bursitis, unspecified laterality (726 5) (M70 60)   19  History of Urinary Tract Infection (V13 02)    Surgical History    1  History of Cataract Surgery   2  History of Cholecystectomy   3  History of Complete Colonoscopy   4  History of Complete Colonoscopy   5  History of Diagnostic Esophagogastroduodenoscopy   6  History of Diagnostic Esophagogastroduodenoscopy   7  History of Dilation And Curettage   8  History of Foot Surgery   9  History of Pancreatic Transplantation   10  Renal Transplant   11  History of Surgery Left Foot Amputation    Family History  Mother    1  No family history of mental disorder  Father    2  Family history of cancer (V16 9) (Z80 9)   3  No family history of mental disorder  Sister    4  Family history of depression (V17 0) (Z81 8)  Grandparent    5  Family history of cancer (V16 9) (Z80 9)  Maternal Grandmother    6  Family history of Breast Cancer (V16 3)    Social History    · Denied: History of Alcohol Use (History)   · Former smoker (V15 82) (J60 234)   · History of Uses cocaine    Current Meds   1  AmLODIPine Besylate 5 MG Oral Tablet; TAKE 1 TABLET TWICE DAILY; Therapy: 27FIY3582 to (Evaluate:30Apr2017)  Requested for: 30JGG5628 Recorded   2  ARIPiprazole 20 MG Oral Tablet; TAKE 1 TABLET AT BEDTIME; Therapy: 59GPG8491 to (Evaluate:18Ocd0773)  Requested for: 30ZUP1576; Last   Rx:95Vax5422 Ordered   3  Aspirin 81 MG TABS; TAKE 1 TABLET DAILY; Therapy: 75WOR5919 to (Evaluate:19Jun2016) Recorded   4  Caltrate 600 TABS; TAKE 1 TABLET TWICE DAILY; Therapy: (Recorded:17Jun2016) to Recorded   5  Catapres 0 3 MG Oral Tablet; TAKE 0 3MG BY MOUTH 2 TIMES A DAY  INDICATIONS:   0 3MG IN AM, 0 2MG AT NOON, AND 0 3MG IN PM;   Therapy: 54YWQ2751 to (Evaluate:30Apr2017)  Requested for: 11NSQ4122 Recorded   6  Clindamycin HCl - 300 MG Oral Capsule; take 2 caps  1 hr  prior to dental procedure; Therapy: 57OID7742 to (Last Rx:83Xum1583)  Requested for: 73KRI6295 Ordered   7  Doxazosin Mesylate 1 MG Oral Tablet; TAKE 1 TABLET AT BEDTIME; Therapy: (Recorded:17Jun2016) to Recorded   8  DULoxetine HCl - 60 MG Oral Capsule Delayed Release Particles; TAKE 1 CAPSULE   TWICE DAILY; Therapy: 98RRT1875 to (Evaluate:29Cfd4843)  Requested for: 30MMT3731; Last   Rx:23Cmm6439 Ordered   9  Folic Acid 1 MG Oral Tablet; TAKE 1 TABLET DAILY AS DIRECTED; Therapy: 15NKB6069 to (Evaluate:14Cpg7685)  Requested for: 05SFC2589; Last   Rx:02Nvm4291 Ordered   10  Furosemide 40 MG Oral Tablet; TAKE 1 TO 2 TABLETS DAILY AS NEEDED FOR EDEMA; Therapy: 63FZN8584 to (Evaluate:25Jun2017)  Requested for: 68WAK4418; Last    Rx:30Jun2016 Ordered   11  HydrALAZINE HCl - 50 MG Oral Tablet; TAKE 1 TABLET 3 TIMES DAILY; Therapy: 64GJX9540 to (Evaluate:26Jan2017)  Requested for: 03KCZ1363; Last    Rx:84Uge1968 Ordered   12  Lactulose 10 GM/15ML Oral Solution; TAKE 30 ML DAILY; Therapy: (Recorded:17Jun2016) to Recorded   13  Levothyroxine Sodium 88 MCG Oral Tablet; take 1 tablet by mouth daily; Therapy: 77YVW2286 to (Jayme Landry)  Requested for: 74MJO0842; Last    Rx:57Qrr2730 Ordered   14  LORazepam 2 MG Oral Tablet; TAKE 1 TABLET TWICE DAILY AS NEEDED;     Therapy: 53CIO3535 to (Evaluate:46Oqk7250); Last Rx:55Kep8850 Ordered   15  Metoprolol Tartrate 50 MG Oral Tablet; take 1 tablet by mouth twice daily; Therapy: 97WMU1519 to (Evaluate:09Jun2017)  Requested for: 36JTN2530; Last    Rx:14Jun2016 Ordered   16  OneTouch Ultra Blue In [employer]; TEST TWICE A DAY; Therapy: 87LKY3133 to (Evaluate:85Rmr5158)  Requested for: 62HOX8629; Last    Rx:10Mar2016 Ordered   17  OneTouch UltraSoft Lancets Miscellaneous; test twice daily; Therapy: 27CJP8896 to (Erin Caulk)  Requested for: 43VCJ7168; Last    Rx:09Mar2016 Ordered   18  Pantoprazole Sodium 40 MG Oral Tablet Delayed Release; take 1 tablet by mouth every    day; Therapy: 58BCS2725 to (Evaluate:56Brv3635)  Requested for: 98LBN3355; Last    Rx:29Flm2073 Ordered   19  Pravastatin Sodium 80 MG Oral Tablet; take 1 tablet by mouth every day; Therapy: 59JFS9556 to (Marifer Feroz)  Requested for: 22LRB8940; Last    VE:58ZMS1345 Ordered   20  PredniSONE 5 MG Oral Tablet; TAKE ONE AND 1/2 TABLETS BY MOUTH DAILY; Therapy: 40IGC7424 to (Suad Delacruz)  Requested for: 31LBN7025; Last    Rx:93Okd5934 Ordered   21  Prograf 1 MG Oral Capsule; Therapy: (Recorded:33Qbk8294) to Recorded   22  ROPINIRole HCl - 0 25 MG Oral Tablet; take 1 tablet by mouth twice daily; Therapy: 19FGK2713 to (Evaluate:14Oan6698)  Requested for: 19MFP6809; Last    Rx:30Jun2016 Ordered   23  Sertraline HCl - 100 MG Oral Tablet; TAKE 2 TABLETS DAILY; Therapy: 48TAE0556 to (Evaluate:50Tup0602)  Requested for: 84QEL3097; Last    Rx:63Qgn5844 Ordered   24  Sodium Bicarbonate 650 MG Oral Tablet; Take one tablet daily; Therapy: 34YCD7993 to (Evaluate:12Apr2016)  Requested for: 94VOB6284 Recorded   25  TraZODone HCl - 150 MG Oral Tablet; TAKE 1 TABLET AT BEDTIME; Therapy: 50ZEZ8164 to (Evaluate:94Ftb7480)  Requested for: 04YEZ0125; Last    Rx:37Mrm2269 Ordered   26  Vitamin D3 1000 UNIT Oral Capsule; TAKE 3 PILLS AT NOON BY MOUTH;     Therapy: (Recorded:20Uvb7676) to Recorded    Allergies    1  Penicillins   2  Morphine Derivatives   3  Gweneth Gilbert    Future Appointments    Date/Time Provider Specialty Site   08/26/2016 10:00 AM EDGAR Harvey  [address]   07/27/2016 04:15 PM EDGAR Ruiz  Psychiatry [facility]   09/14/2016 10:00 AM EDGAR Villarreal   Nephrology [facility]     Signatures   Electronically signed by : EDGAR Dawson ; Jul 22 2016  2:23PM EST                       (Author)

## 2018-01-17 NOTE — MISCELLANEOUS
Message     Recorded as Task   Date: 06/28/2017 09:22 PM, Created By: Ji Peterson   Task Name: Med Renewal Request   Assigned To: Jaqiu Emanuel   Regarding Patient: El Luna, Status: Active   Comment:    Ji Peterson - 28 Jun 2017 9:22 PM     TASK CREATED  Hello    with that labwork, can patient also have urine protein/cr ratio sent and UA and BK PCR  Mailed to pt  Thank you    jessica        Active Problems    1  Abnormal EKG (794 31) (R94 31)   2  Acid reflux disease (530 81) (K21 9)   3  Acute kidney injury superimposed on CKD (866 00,585 9) (N17 9,N18 9)   4  Anemia (285 9) (D64 9)   5  Antibiotic prophylaxis for dental procedure indicated due to prior joint replacement   (V58 62) (Z79 2)   6  Atrial fibrillation (427 31) (I48 91)   7  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)   8  Benign hypertensive CKD (403 10) (I12 9)   9  Bilateral carotid bruits (785 9) (R09 89)   10  Bilateral leg edema (782 3) (R60 0)   11  Bruit of left carotid artery (785 9) (R09 89)   12  Cataract (366 9) (H26 9)   13  Chronic constipation (564 00) (K59 09)   14  Chronic diastolic congestive heart failure (428 32,428 0) (I50 32)   15  Chronic kidney disease, stage 4 (severe) (585 4) (N18 4)   16  Cocaine abuse in remission (305 63) (F14 10)   17  Cognitive changes (799 59) (R41 89)   18  Colon cancer screening (V76 51) (Z12 11)   19  Constipation (564 00) (K59 00)   20  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, without long-term    current use of insulin (250 61) (E10 49)   21  Diabetes mellitus with diabetic nephropathy (250 40,583 81) (E11 21)   22  Diabetic Gastropathy (537 9)   23  Diabetic polyneuropathy (250 60,357 2) (E11 42)   24  Diabetic retinopathy (250 50,362 01) (E11 319)   25  Diabetic Ulcer Of The Left Foot (250 80)   26  Diastolic dysfunction (521 7) (I51 9)   27  ROD (dyspnea on exertion) (786 09) (R06 09)   28  Drug-induced Essential Tremor (333 1)   29   Encounter for screening mammogram for breast cancer (V76 12) (Z12 31)   30  Esophagitis, reflux (530 11) (K21 0)   31  External Hemorrhoids (455 3)   32  Fatigue (780 79) (R53 83)   33  FELIPE (generalized anxiety disorder) (300 02) (F41 1)   34  H/O kidney transplant (V42 0) (Z94 0)   35  Heart palpitations (785 1) (R00 2)   36  Hospital discharge follow-up (V67 59) (Z09)   40  Hyperlipidemia (272 4) (E78 5)   38  Hyperplastic colon polyp (211 3) (K63 5)   39  Hypertension (401 9) (I10)   40  Hyponatremia (276 1) (E87 1)   41  Hypothyroidism (244 9) (E03 9)   42  Increased frequency of urination (788 41) (R35 0)   43  Increased white blood cell count (288 60) (D72 829)   44  Insomnia (780 52) (G47 00)   45  Irritable bowel syndrome (564 1) (K58 9)   46  Major depressive disorder, recurrent episode, in full remission (296 36) (F33 42)   47  Memory loss (780 93) (R41 3)   48  Memory loss or impairment (780 93) (R41 3)   49  Metabolic acidosis (233 6) (E87 2)   50  MGUS (monoclonal gammopathy of unknown significance) (273 1) (D47 2)   51  Need for influenza vaccination (V04 81) (Z23)   52  Nonrheumatic aortic valve insufficiency (424 1) (I35 1)   53  OAB (overactive bladder) (596 51) (N32 81)   54  Osteopenia (733 90) (M85 80)   55  Osteoporosis (733 00) (M81 0)   56  Other calculus in bladder (594 1) (N21 0)   57  Peripheral vascular disease (443 9) (I73 9)   58  Post traumatic stress disorder (309 81) (F43 10)   59  Preop general physical exam (V72 83) (Z01 818)   60  Preoperative cardiovascular examination (V72 81) (Z01 810)   61  Proteinuria (791 0) (R80 9)   62  Pyelonephritis (590 80) (N12)   63  Rectal polyp (569 0) (K62 1)   64  Recurrent UTI (599 0) (N39 0)   65  Restless legs syndrome (333 94) (G25 81)   66  Secondary hyperparathyroidism, renal (588 81) (N25 81)   67  Snoring (786 09) (R06 83)   68  SOB (shortness of breath) (786 05) (R06 02)   69  Tremor (781 0) (R25 1)   70  Unsteady gait (781 2) (R26 81)   71   Weakness (780 79) (R53 1)    Current Meds   1  AmLODIPine Besylate 10 MG Oral Tablet; take 1/2 tab  BID; Therapy: 14EKK9927 to (Last Rx:07Apr2017)  Requested for: 33NVG7947 Ordered   2  ARIPiprazole 30 MG Oral Tablet; Take 1 tablet by mouth at bedtime; Therapy: 56OSD0223 to (Evaluate:88Nno6006)  Requested for: 27YMX1611; Last   Rx:23May2017 Ordered   3  Aspirin 81 MG TABS; TAKE 1 TABLET DAILY; Therapy: 30DMU3283 to (Evaluate:19Jun2016) Recorded   4  Catapres 0 1 MG Oral Tablet (CloNIDine HCl); take 3 tab  in am, 2 tab at noon, 3 tab  at   night; Therapy: 06NJN7537 to (Last Rx:22Mar2017)  Requested for: 23Mar2017 Ordered   5  Clindamycin HCl - 300 MG Oral Capsule; TAKE 2 CAPSULES 1 HOUR PRIOR TO   DENTAL APPOINTMENT; Therapy: 47TON1950 to (02 37 15 52 25)  Requested for: 13Apr2017; Last   Rx:07Apr2017 Ordered   6  Doxazosin Mesylate 1 MG Oral Tablet; TAKE 1 TABLET Bedtime  Requested for:   60VYJ6013; Last Rx:05Apr2017 Ordered   7  DULoxetine HCl - 60 MG Oral Capsule Delayed Release Particles (Cymbalta); TAKE 1   CAPSULE TWICE DAILY; Therapy: 71VKK4446 to (Evaluate:62Arx4983)  Requested for: 79KFA3397; Last   Rx:02Mar2017 Ordered   8  Flonase Allergy Relief 50 MCG/ACT Nasal Suspension (Fluticasone Propionate); USE 2   SPRAYS IN EACH NOSTRIL ONCE DAILY; Therapy: 98RNG6905 to Recorded   9  Folic Acid 1 MG Oral Tablet; TAKE 1 TABLET DAILY AS DIRECTED; Therapy: 30YVH9114 to (Evaluate:07Oct2017)  Requested for: 07XSN7127; Last   Rx:12Oct2016 Ordered   10  Furosemide 20 MG Oral Tablet; take 1/2 tab  daily; Therapy: 28Gmf0027 to Recorded   11  Generlac 10 GM/15ML Oral Solution; TAKE 30 ML BY MOUTH daily; Therapy: 15UXT9061 to ([phone])  Requested for: 14GXU1030; Last    Rx:02Jun2017 Ordered   12  HydrALAZINE HCl - 50 MG Oral Tablet; TAKE 1 TABLET 3 TIMES DAILY; Therapy: 65WVL0380 to (Danii Hewitt)  Requested for: 54LPN4530; Last    Rx:13Mar2017 Ordered   13   Lactulose 10 GM/15ML Oral Solution; TAKE 30 ML DAILY; Therapy: 09JNT7064 to (Last Rx:02Jun2017)  Requested for: 02Jun2017 Ordered   14  Levothyroxine Sodium 88 MCG Oral Tablet; take 1 tablet by mouth daily; Therapy: 67MPC4986 to (Evaluate:15Mar2017)  Requested for: 78VEC9691; Last    Rx:76Dgu9590 Ordered   15  LORazepam 2 MG Oral Tablet; TAKE 1 TABLET 3 TIMES DAILY AS NEEDED; Therapy: 32WRD0623 to (Evaluate:15May2017)  Requested for: 75YBD8458; Last    Rx:16Nov2016 Ordered   16  Magnesium 200 MG Oral Tablet; Therapy: 46BTS8092 to Recorded   17  Metoprolol Tartrate 50 MG Oral Tablet; take 1 tablet by mouth twice daily; Therapy: 74HFH1944 to (Evaluate:62Zrp5816)  Requested for: 27Jun2017; Last    NF:79ZMI3033 Ordered   18  OneTouch Ultra Blue In [employer]; test twice daily; Therapy: 69QOM9626 to (Evaluate:24Mar2018)  Requested for: 66BTM0491; Last    Rx:29Mar2017 Ordered   19  OneTouch UltraSoft Lancets Miscellaneous; test twice daily; Therapy: 47CGY5017 to (Beto Penn)  Requested for: 88GUZ7740; Last    Rx:09Mar2016 Ordered   20  Pravastatin Sodium 80 MG Oral Tablet; take 1 tablet by mouth every day; Therapy: 20CCR0882 to (Evaluate:54Cih3813)  Requested for: 36OPQ3236; Last    Rx:22Jan2017 Ordered   21  PredniSONE 5 MG Oral Tablet; TAKE 1 1/2 TABLETS BY MOUTH EVERY DAY; Therapy: 26XOB6418 to (Evaluate:07Nsu2916)  Requested for: 15YSH5416; Last    Rx:06Jun2017 Ordered   22  ROPINIRole HCl - 0 25 MG Oral Tablet; take 1 tablet by mouth twice daily; Therapy: 35YUA9809 to (Evaluate:67Lex9214)  Requested for: 05SCY1721; Last    Rx:30Jun2016 Ordered   23  Sertraline HCl - 100 MG Oral Tablet; TAKE 2 TABLETS DAILY; Therapy: 81MHG0001 to (Evaluate:15May2017)  Requested for: 41MIB0268; Last    Rx:16Nov2016 Ordered   24  Sodium Bicarbonate 650 MG Oral Tablet; Take one tablet daily; Therapy: 43FMC4707 to (Evaluate:12Apr2016)  Requested for: 74FTB7437 Recorded   25  Tacrolimus 1 MG Oral Capsule (Prograf);  Take 4 in the morning and 4 in the evening     Requested for: 27Jun2017; Last Rx:26Jun2017 Ordered   26  TraZODone HCl - 150 MG Oral Tablet; TAKE 1 TABLET AT BEDTIME; Therapy: 37JGN3077 to (Evaluate:82Lkp8078)  Requested for: 81HPL3724; Last    Rx:02Mar2017 Ordered   27  VESIcare 5 MG Oral Tablet; take one tablet daily; Therapy: 95Wul8590 to Recorded   28  Vitamin D3 1000 UNIT Oral Capsule; TAKE 3 PILLS AT NOON BY MOUTH; Therapy: (Recorded:17Jun2016) to Recorded    Allergies    1  Penicillins   2  Morphine Derivatives   3  Duricef TABS   4   Myrbetriq TB24    Signatures   Electronically signed by : Wilfredo Muñoz, ; Jun 29 2017  9:52AM EST                       (Author)
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
## 2018-01-17 NOTE — RESULT NOTES
Message  BP log reviewed  will readdress with patient at appt  -323 with low diastolics  Patient is on multiple medications and has history of orthostasis        Signatures   Electronically signed by : EDGAR Yanez ; Jul 13 2017  3:54PM EST                       (Author)

## 2018-01-17 NOTE — RESULT NOTES
Message   Call patient  Blood work showed fasting blood sugar 122, Hemoglobin A1c 6 1 - good blood sugar control  Kidney function test creatinine 1 70 - still elevated  Recommended to followup with nephrologist Dr Gerard Snowden  TSH 3 980 - continue Synthroid 75 mcg daily  Recheck TSH level in 2 months  Mail slip for blood work to patient  Verified Results  (1) HEMOGLOBIN A1C [id number] 11:05AM Karen Germain   5 7-6 4% impaired fasting glucosern>=6 5% diagnosis of diabetesrnrnFalsely low levels are seen in conditions linked to short RBC life span-rnhemolytic anemia, and splenomegalyrnFalsely elevated levels are seen in situations where there is an increased production of RBC- receipt of erythropoietin or blood transfusionsrnAdopted from ADA-Clinical Practice Recommendations     Test Name Result Flag Reference   HEMOGLOBIN A1C 6 1 % H 4 0-5 6   EST  AVG  GLUCOSE 128 mg/dl       (1) MICROALBUMIN CREATININE RATIO, RANDOM URINE [id number] 10:20AM Vesta Him     Test Name Result Flag Reference   MICROALBUMIN/ CREAT R < mg/g creatinine  0-30   MICROALBUMIN,URINE < mg/L  0 0-20 0   CREATININE URINE 18 7 mg/dL       (1) COMPREHENSIVE METABOLIC PANEL [id number] 96[facility]   National Kidney Disease Education Program recommendations are as follows:rnGFR calculation is accurate only with a steady state creatininernChronic Kidney disease less than 60 ml/min/1 73 sq  metersrnKidney failure less than 15 ml/min/1 73 sq  meters       Test Name Result Flag Reference   GLUCOSE,RANDM 122 mg/dL     SODIUM 137 mmol/L  136-145   POTASSIUM 3 8 mmol/L  3 5-5 3   CHLORIDE 103 mmol/L  100-108   CARBON DIOXIDE 26 mmol/L  21-32   ANION GAP (CALC) 8 mmol/L  4-13   BLOOD UREA NITROGEN 39 mg/dL H 5-25   CREATININE 1 70 mg/dL H 0 60-1 30   CALCIUM 8 7 mg/dL  8 3-10 1   BILI, TOTAL 0 29 mg/dL  0 20-1 00   ALK PHOSPHATAS 117 U/L H    ALT (SGPT) 63 U/L  12-78   AST(SGOT) 34 U/L  5-45 ALBUMIN 3 2 g/dL L 3 5-5 0   TOTAL PROTEIN 8 0 g/dL  6 4-8 2   eGFR Non-African American 31 7 ml/min/1 73sq m       (1) TSH [id number] 10:18AM Kate Cohn   Patients undergoing fluorescein dye angiography may retain small amounts of fluorescein in the body for 48-72 hours post procedure  Samples containing fluorescein can produce falsely depressed TSH values  If the patient had this procedure,a specimen should be resubmitted post fluorescein clearance  rnrnrnrnThe recommended reference ranges for TSH during pregnancy are as follows:rnFirst trimester 0 1 to 2 5 uIU/mLrnSecond trimester  0 2 to 3 0 uIU/mLrnThird trimester 0 3 to 3 0 uIU/mrn     Test Name Result Flag Reference   TSH 3 980 uIU/mL H 0 358-3 740       Plan  Hypothyroidism    · (1) TSH; Status:Active;  Requested for:10Mar2016;

## 2018-01-17 NOTE — RESULT NOTES
Message   I called patient  She currently taking Synthroid 88 mcg daily ( started on 2/2/16)  Recommended to continue current dose and recheck TSH, T 4 free in 6 weeks  Will mail slip for blood to patient  Verified Results  (1) TSH [id number] 06:39AM Amari Munoz   Patients undergoing fluorescein dye angiography may retain small amounts of fluorescein in the body for 48-72 hours post procedure  Samples containing fluorescein can produce falsely depressed TSH values  If the patient had this procedure,a specimen should be resubmitted post fluorescein clearance          The recommended reference ranges for TSH during pregnancy are as follows:  First trimester 0 1 to 2 5 uIU/mL  Second trimester  0 2 to 3 0 uIU/mL  Third trimester 0 3 to 3 0 uIU/m     Test Name Result Flag Reference   TSH 4 430 uIU/mL H 0 358-3 740       Signatures   Electronically signed by : EDGAR Vo ; Mar  8 2016 12:15PM EST                       (Author)

## 2018-01-17 NOTE — MISCELLANEOUS
Message   Recorded as Task   Date: 02/02/2016 12:34 PM, Created By: Micah Young   Task Name: Miscellaneous   Assigned To: Cheryl Blackmon   Regarding Patient: Abhinav Gregory, Status: Active   CommentInis Mililani [place] - 02 Feb 2016 12:34 PM     TASK CREATED  re: decreasing weights    Lynita Ped decrease lasix to 40mg am and 20mg pm   I spoke with the patient and she is aware of the above  kja      Active Problems    1  Abnormal EKG (794 31) (R94 31)   2  Acute on chronic diastolic congestive heart failure (428 33,428 0) (I50 33)   3  Anemia (285 9) (D64 9)   4  Atrial fibrillation (427 31) (I48 91)   5  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)   6  Benign hypertensive CKD (403 10) (I12 9)   7  Bilateral leg edema (782 3) (R60 0)   8  Cataract (366 9) (H26 9)   9  Chronic kidney disease, stage 3 (585 3) (N18 3)   10  Cocaine abuse in remission (305 63) (F14 10)   11  Cognitive changes (799 59) (R41 89)   12  Constipation (564 00) (K59 00)   13  Diabetic Gastropathy (537 9)   14  Diabetic Nephropathy (583 81)   15  Diabetic polyneuropathy (250 60,357 2) (E11 42)   16  Diabetic retinopathy (250 50,362 01) (E11 319)   17  Diabetic Ulcer Of The Left Foot (250 80)   18  ROD (dyspnea on exertion) (786 09) (R06 09)   19  Drug-induced Essential Tremor (333 1)   20  Encounter for screening mammogram for breast cancer (V76 12) (Z12 31)   21  Esophageal reflux (530 81) (K21 9)   22  Esophagitis, reflux (530 11) (K21 0)   23  External Hemorrhoids (455 3)   24  FELIPE (generalized anxiety disorder) (300 02) (F41 1)   25  H/O kidney transplant (V42 0) (Z94 0)   26  Heart palpitations (785 1) (R00 2)   27  Hyperlipidemia (272 4) (E78 5)   28  Hyperplastic colon polyp (211 3) (K63 5)   29  Hypertension (401 9) (I10)   30  Hypothyroidism (244 9) (E03 9)   31  Increased white blood cell count (288 60) (D72 829)   32  Irritable bowel syndrome (564 1) (K58 9)   33  Major depressive disorder, recurrent episode, 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) (F37 41)   34  Memory loss (780 93) (R41 3)   35  Metabolic acidosis (307 5) (E87 2)   36  MGUS (monoclonal gammopathy of unknown significance) (273 1) (D47 2)   37  Neck pain (723 1) (M54 2)   38  Nonrheumatic aortic valve insufficiency (424 1) (I35 1)   39  Osteopenia (733 90) (M85 80)   40  Osteoporosis (733 00) (M81 0)   41  Peripheral vascular disease (443 9) (I73 9)   42  Post traumatic stress disorder (309 81) (F43 10)   43  Proteinuria (791 0) (R80 9)   44  Rectal polyp (569 0) (K62 1)   45  Restless legs syndrome (333 94) (G25 81)   46  Secondary hyperparathyroidism, renal (588 81) (N25 81)   47  Sinus Tachycardia (427 89)   48  SOB (shortness of breath) (786 05) (R06 02)   49  Tremor (781 0) (R25 1)   50  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 (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 31) (D92 82)    Current Meds   1  Abilify 20 MG Oral Tablet (ARIPiprazole); TAKE 1 TABLET AT BEDTIME; Therapy: 67WDQ3842 to (Dorisann Rolling)  Requested for: 61Rxp8890; Last   Rx:15Dip1845 Ordered   2  AmLODIPine Besylate 10 MG Oral Tablet; take 1/2 tab  BID; Therapy: 64WIX5843 to (Evaluate:41Xtk3818)  Requested for: 42Caw7034 Recorded   3  Aspirin 81 MG Oral Tablet; TAKE 1 TABLET DAILY; Therapy: 21LNS8816 to (Evaluate:19Jun2016) Recorded   4  Carafate 1 GM/10ML Oral Suspension; Therapy: (0699 982 13 20) to Recorded   5  Citracal TABS; Take one tablet twice daily Recorded   6  Clindamycin HCl - 300 MG Oral Capsule; TAKE 2 CAPSULES 1 HOUR PRIOR TO   DENTAL APPOINTMENT; Therapy: 13Pou4720 to (Evaluate:60Oac9560)  Requested for: 31VWY4846; Last   ZB:51EKN4178 Ordered   7  CloNIDine HCl - 0 1 MG Oral Tablet; Take three tablets every morning, two tablets every   noon, and three tablets every evening; Therapy: 15GPF6505 to (Evaluate:14Mar2016)  Requested for: 20Mar2015; Last   Rx:20Mar2015 Ordered   8  DULoxetine HCl - 60 MG Oral Capsule Delayed Release Particles (Cymbalta); TAKE 1   CAPSULE TWICE DAILY;    Therapy: 38OSF1290 to ([id number])  Requested for: 91DWJ3262; Last   Rx:83Yxa8064; Status: ACTIVE - Renewal Denied Ordered   9  Folic Acid 1 MG Oral Tablet; TAKE 1 TABLET DAILY AS DIRECTED; Therapy: 98VEG4843 to (Evaluate:91Lrh3856)  Requested for: 68BFW0040; Last   Rx:57Cvy6364 Ordered   10  Furosemide 40 MG Oral Tablet; TAKE ONE TABLET EVERY MORNING AND 1/2 TABLET    EVERY EVENING; Therapy: 35FJJ2939 to (Miryam Ferreira)  Requested for: 85BBM5448 Recorded   11  Generlac 10 GM/15ML SYRP Recorded   12  HydrALAZINE HCl - 50 MG Oral Tablet; TAKE 1 TABLET 3 TIMES DAILY; Therapy: 82NEY4268 to (Evaluate:26Jan2017)  Requested for: 43NVS7018; Last    Rx:26Cxq0155 Ordered   13  Hydrocodone-Acetaminophen 5-325 MG Oral Tablet; TAKE 1 TABLET EVERY 6 HOURS    AS NEEDED FOR PAIN;    Therapy: 20KQU7135 to (Evaluate:07Jan2016); Last Rx:73Xlf9860 Ordered   14  Levothyroxine Sodium 75 MCG Oral Tablet; take 1 tablet by mouth every day; Therapy: 95BKR4077 to (Evaluate:95Pla2232)  Requested for: 38JMO8709; Last    Rx:30Wfj2606 Ordered   15  LORazepam 2 MG Oral Tablet; TAKE 1 TABLET TWICE DAILY AS NEEDED; Therapy: 36HGY0335 to (Evaluate:19Jun2016) Recorded   16  Magnesium  (64 Mg) MG TBCR; TAKE 1 TABLET DAILY; Therapy: 06RPG3771 to (Evaluate:29Imr3704); Last Rx:05Jun2013 Ordered   17  Metoprolol Tartrate 50 MG Oral Tablet; take 1 tablet by mouth twice a day; Therapy: 30HNK0367 to (Ryan Coffin)  Requested for: 58RPH3239; Last    Rx:26Jan2016 Ordered   18  OneTouch Ultra Blue In Vitro Strip; TEST EVERY DAY AND AS NEEDED; Therapy: 65EDO0317 to (Evaluate:19Apr2016)  Requested for: 84Sma3106; Last    Rx:46Aym7077 Ordered   19  Pantoprazole Sodium 40 MG Oral Tablet Delayed Release; TAKE 1 TABLET DAILY; Therapy: 94LIK8619 to (Evaluate:02Apr2016)  Requested for: 99LTJ0258; Last    Rx:05Oct2015 Ordered   20  Pravastatin Sodium 80 MG Oral Tablet; take 1 tablet by mouth every day;     Therapy: 80ODO3939 to (Evaluate:77Ydw5046)  Requested for: 39Wwo9391; Last    Rx:84Zqb0280 Ordered   21  PredniSONE 5 MG Oral Tablet; Take 1-1/2 tablets daily; Therapy: 15EZX6431 to (Evaluate:14Mar2016)  Requested for: 00Uet1714; Last    Rx:20Mar2015 Ordered   22  ROPINIRole HCl - 0 25 MG Oral Tablet; take 1 tablet by mouth twice a day; Therapy: 30IHW9326 to (Lissette Roy)  Requested for: 22Anb1049; Last    Rx:03Pjh5268 Ordered   23  Sertraline HCl - 100 MG Oral Tablet; TAKE 2 TABLETS DAILY; Therapy: 10RLO4608 to (Lissette Roy)  Requested for: 87EEY0793; Last    Rx:61Zre2325; Status: ACTIVE - Renewal Denied Ordered   24  Sodium Bicarbonate 650 MG Oral Tablet; Take one tablet daily; Therapy: 26GAH8504 to (Evaluate:12Apr2016)  Requested for: 03RCW3300 Recorded   25  Tacrolimus 1 MG Oral Capsule; TAKE 4 CAPSULES every morning and 3 capsules every    evening; Therapy: 01HRC7790 to (Gabriele Eagle)  Requested for: 40TFO8527; Last    Rx:28Iit5787 Ordered   26  TraZODone HCl - 150 MG Oral Tablet; TAKE 1 TABLET AT BEDTIME; Therapy: 95NTX4055 to (Lissette Roy)  Requested for: 37EAK6581; Last    Rx:30Xud7652; Status: ACTIVE - Renewal Denied Ordered   27  Vitamin D3 3000 UNIT Oral Tablet; 1 TAB DAILY; Therapy: 32ITI8619 to (Evaluate:35Wdn3888) Recorded    Allergies    1  Penicillins   2  Morphine Derivatives   3   Erik Gallito    Signatures   Electronically signed by : EDGAR Lutz ; Feb 2 2016  1:43PM EST

## 2018-01-17 NOTE — MISCELLANEOUS
Message   Recorded as Task   Date: 06/27/2017 02:03 PM, Created By: Nae Bryan   Task Name: Miscellaneous   Assigned To: Deondre Fields   Regarding Patient: Nedra Cruz, Status: In Progress   Comment:    Sundeep Boyle - 27 Jun 2017 2:03 PM     TASK CREATED  re; Slight increase in creat, check if any edema and if none, hold lasix for 2 doses and repeat bmp w Tac level in 10 days  Please have results go to [place] so her care doesn't get fragmented   Ana Maria Stark - 27 Jun 2017 2:28 PM     TASK IN PROGRESS   I spoke with MJ in regards to her most recent blood work  I did make her aware of the slight increase in creat, she denied having any edema so I have directed her to hold 2 doses of lasix then resume Friday 6/30  Follow up BMP and Tac have been ordered to reassess renal fx  I will ensure all future labs go to Dr Emy Wolf for continuation of care  [facility] 6/27/2017      Active Problems    1  Abnormal EKG (794 31) (R94 31)   2  Acid reflux disease (530 81) (K21 9)   3  Acute kidney injury superimposed on CKD (866 00,585 9) (N17 9,N18 9)   4  Anemia (285 9) (D64 9)   5  Antibiotic prophylaxis for dental procedure indicated due to prior joint replacement   (V58 62) (Z79 2)   6  Atrial fibrillation (427 31) (I48 91)   7  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)   8  Benign hypertensive CKD (403 10) (I12 9)   9  Bilateral carotid bruits (785 9) (R09 89)   10  Bilateral leg edema (782 3) (R60 0)   11  Bruit of left carotid artery (785 9) (R09 89)   12  Cataract (366 9) (H26 9)   13  Chronic constipation (564 00) (K59 09)   14  Chronic diastolic congestive heart failure (428 32,428 0) (I50 32)   15  Chronic kidney disease, stage 4 (severe) (585 4) (N18 4)   16  Cocaine abuse in remission (305 63) (F14 10)   17  Cognitive changes (799 59) (R41 89)   18  Colon cancer screening (V76 51) (Z12 11)   19  Constipation (564 00) (K59 00)   20   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, without long-term current use of insulin (250 61) (E10 49)   21  Diabetes mellitus with diabetic nephropathy (250 40,583 81) (E11 21)   22  Diabetic Gastropathy (537 9)   23  Diabetic polyneuropathy (250 60,357 2) (E11 42)   24  Diabetic retinopathy (250 50,362 01) (E11 319)   25  Diabetic Ulcer Of The Left Foot (250 80)   26  Diastolic dysfunction (870 4) (I51 9)   27  ROD (dyspnea on exertion) (786 09) (R06 09)   28  Drug-induced Essential Tremor (333 1)   29  Encounter for screening mammogram for breast cancer (V76 12) (Z12 31)   30  Esophagitis, reflux (530 11) (K21 0)   31  External Hemorrhoids (455 3)   32  Fatigue (780 79) (R53 83)   33  FELIPE (generalized anxiety disorder) (300 02) (F41 1)   34  H/O kidney transplant (V42 0) (Z94 0)   35  Heart palpitations (785 1) (R00 2)   36  Hospital discharge follow-up (V67 59) (Z09)   40  Hyperlipidemia (272 4) (E78 5)   38  Hyperplastic colon polyp (211 3) (K63 5)   39  Hypertension (401 9) (I10)   40  Hyponatremia (276 1) (E87 1)   41  Hypothyroidism (244 9) (E03 9)   42  Increased frequency of urination (788 41) (R35 0)   43  Increased white blood cell count (288 60) (D72 829)   44  Insomnia (780 52) (G47 00)   45  Irritable bowel syndrome (564 1) (K58 9)   46  Major depressive disorder, recurrent episode, in full remission (296 36) (F33 42)   47  Memory loss (780 93) (R41 3)   48  Memory loss or impairment (780 93) (R41 3)   49  Metabolic acidosis (499 1) (E87 2)   50  MGUS (monoclonal gammopathy of unknown significance) (273 1) (D47 2)   51  Need for influenza vaccination (V04 81) (Z23)   52  Nonrheumatic aortic valve insufficiency (424 1) (I35 1)   53  OAB (overactive bladder) (596 51) (N32 81)   54  Osteopenia (733 90) (M85 80)   55  Osteoporosis (733 00) (M81 0)   56  Other calculus in bladder (594 1) (N21 0)   57  Peripheral vascular disease (443 9) (I73 9)   58  Post traumatic stress disorder (309 81) (F43 10)   59  Preop general physical exam (V72 83) (Z01 818)   60  Preoperative cardiovascular examination (V72 81) (Z01 810)   61  Proteinuria (791 0) (R80 9)   62  Pyelonephritis (590 80) (N12)   63  Rectal polyp (569 0) (K62 1)   64  Recurrent UTI (599 0) (N39 0)   65  Restless legs syndrome (333 94) (G25 81)   66  Secondary hyperparathyroidism, renal (588 81) (N25 81)   67  Snoring (786 09) (R06 83)   68  SOB (shortness of breath) (786 05) (R06 02)   69  Tremor (781 0) (R25 1)   70  Unsteady gait (781 2) (R26 81)   71  Weakness (780 79) (R53 1)    Current Meds   1  AmLODIPine Besylate 10 MG Oral Tablet; take 1/2 tab  BID; Therapy: 84OJZ4564 to (Last Rx:07Apr2017)  Requested for: 61EOH6356 Ordered   2  ARIPiprazole 30 MG Oral Tablet; Take 1 tablet by mouth at bedtime; Therapy: 83WKY8568 to (Evaluate:24Gue7644)  Requested for: 29YTG4260; Last   Rx:23May2017 Ordered   3  Aspirin 81 MG TABS; TAKE 1 TABLET DAILY; Therapy: 35ATY2880 to (Evaluate:19Jun2016) Recorded   4  Catapres 0 1 MG Oral Tablet (CloNIDine HCl); take 3 tab  in am, 2 tab at noon, 3 tab  at   night; Therapy: 78UGE7516 to (Last Rx:22Mar2017)  Requested for: 23Mar2017 Ordered   5  Clindamycin HCl - 300 MG Oral Capsule; TAKE 2 CAPSULES 1 HOUR PRIOR TO   DENTAL APPOINTMENT; Therapy: 99HRJ3540 to (Feliciano Aguilera)  Requested for: 13Apr2017; Last   Rx:07Apr2017 Ordered   6  Doxazosin Mesylate 1 MG Oral Tablet; TAKE 1 TABLET Bedtime  Requested for:   78AZE2457; Last Rx:05Apr2017 Ordered   7  DULoxetine HCl - 60 MG Oral Capsule Delayed Release Particles (Cymbalta); TAKE 1   CAPSULE TWICE DAILY; Therapy: 39MKE8427 to (Evaluate:64Wqh2383)  Requested for: 89SBM6093; Last   Rx:02Mar2017 Ordered   8  Flonase Allergy Relief 50 MCG/ACT Nasal Suspension (Fluticasone Propionate); USE 2   SPRAYS IN EACH NOSTRIL ONCE DAILY; Therapy: 88WOX0756 to Recorded   9  Folic Acid 1 MG Oral Tablet; TAKE 1 TABLET DAILY AS DIRECTED;    Therapy: 90NXR8305 to ()  Requested for: 24UIH2129; Last   Rx:12Oct2016 Ordered   10  Furosemide 20 MG Oral Tablet; take 1/2 tab  daily; Therapy: 58Wbf6189 to Recorded   11  Generlac 10 GM/15ML Oral Solution; TAKE 30 ML BY MOUTH daily; Therapy: 61NQN1313 to (23 405605)  Requested for: 44XUK0426; Last    Rx:02Jun2017 Ordered   12  HydrALAZINE HCl - 50 MG Oral Tablet; TAKE 1 TABLET 3 TIMES DAILY; Therapy: 21LOP5855 to ([facility])  Requested for: 52FQJ0338; Last    Rx:13Mar2017 Ordered   13  Lactulose 10 GM/15ML Oral Solution; TAKE 30 ML DAILY; Therapy: 89AQI7174 to (Last Rx:02Jun2017)  Requested for: 02Jun2017 Ordered   14  Levothyroxine Sodium 88 MCG Oral Tablet; take 1 tablet by mouth daily; Therapy: 73DMY6646 to (Evaluate:15Mar2017)  Requested for: 25GQD3624; Last    Rx:47Boa5972 Ordered   15  LORazepam 2 MG Oral Tablet; TAKE 1 TABLET 3 TIMES DAILY AS NEEDED; Therapy: 98QNM7582 to (Evaluate:33Huk7492)  Requested for: 10KKQ5072; Last    Rx:16Nov2016 Ordered   16  Magnesium 200 MG Oral Tablet; Therapy: 18JMZ7255 to Recorded   17  Metoprolol Tartrate 50 MG Oral Tablet; take 1 tablet by mouth twice daily; Therapy: 20BAQ0251 to (Evaluate:82Auc0908)  Requested for: 27Jun2017; Last    KATIE:85GRE3893 Ordered   18  OneTouch Ultra Blue In [employer]; test twice daily; Therapy: 33DWZ0640 to (Evaluate:24Mar2018)  Requested for: 08QOR2580; Last    Rx:29Mar2017 Ordered   19  OneTouch UltraSoft Lancets Miscellaneous; test twice daily; Therapy: 74MFL8234 to ([facility])  Requested for: 54XIR8570; Last    Rx:09Mar2016 Ordered   20  Pravastatin Sodium 80 MG Oral Tablet; take 1 tablet by mouth every day; Therapy: 56NQH6876 to (Evaluate:64Xck5227)  Requested for: 57IMH9983; Last    Rx:22Jan2017 Ordered   21  PredniSONE 5 MG Oral Tablet; TAKE 1 1/2 TABLETS BY MOUTH EVERY DAY; Therapy: 91SEN3224 to (Evaluate:49Jrr5921)  Requested for: 16PCP7063; Last    Rx:06Jun2017 Ordered   22   ROPINIRole HCl - 0 25 MG Oral Tablet; take 1 tablet by mouth twice daily; Therapy: 77DYX5584 to (Evaluate:29Jxl7854)  Requested for: 83THL5479; Last    Rx:30Jun2016 Ordered   23  Sertraline HCl - 100 MG Oral Tablet; TAKE 2 TABLETS DAILY; Therapy: 13GYS6532 to (Evaluate:70Esg8753)  Requested for: 40ORE6629; Last    Rx:18Eoa9905 Ordered   24  Sodium Bicarbonate 650 MG Oral Tablet; Take one tablet daily; Therapy: 81GSW7894 to (Evaluate:12Apr2016)  Requested for: 58VLE6040 Recorded   25  Tacrolimus 1 MG Oral Capsule (Prograf); Take 4 in the morning and 4 in the evening     Requested for: 75Uqi7001; Last Rx:26Jun2017 Ordered   26  TraZODone HCl - 150 MG Oral Tablet; TAKE 1 TABLET AT BEDTIME; Therapy: 63EGJ1456 to (Evaluate:01Wqh8293)  Requested for: 84IVR0390; Last    Rx:02Mar2017 Ordered   27  VESIcare 5 MG Oral Tablet; take one tablet daily; Therapy: 94Izg4722 to Recorded   28  Vitamin D3 1000 UNIT Oral Capsule; TAKE 3 PILLS AT NOON BY MOUTH; Therapy: (Recorded:17Jun2016) to Recorded    Allergies    1  Penicillins   2  Morphine Derivatives   3  Duricef TABS   4  Myrbetriq TB24    Plan  Chronic kidney disease, stage 4 (severe)    · (1) BASIC METABOLIC PROFILE; Status:Active - Retrospective By Protocol  Authorization; Requested for:63Vqe2008;   Chronic kidney disease, stage 4 (severe), H/O kidney transplant    · (1)  TACROLIMUS; Status:Active - Retrospective By Protocol Authorization;   Requested for:05Heb1222;     Signatures   Electronically signed by : EDGAR Sharma ; Jun 28 2017  4:42PM EST

## 2018-01-17 NOTE — MISCELLANEOUS
Message   Recorded as Task   Date: 11/10/2016 09:23 AM, Created By: Brendan Acosta   Task Name: Miscellaneous   Assigned To: Alisha Trammell   Regarding Patient: Shelley Maravilla, Status: In Progress   CommentCarron Letters - 10 Nov 2016 9:23 AM     TASK CREATED  need Tac level and labs since August   Ana Maria Stark - 10 Nov 2016 11:47 AM     TASK IN PROGRESS   Delaney Montenegro states she has not had any labs since Aug, pt states that she completely forgot  She states that she will try to go tomorrow morning  [facility] 11/1/0      Active Problems    1  Abnormal EKG (794 31) (R94 31)   2  Acid reflux disease (530 81) (K21 9)   3  Allergic urticaria (708 0) (L50 0)   4  Anemia (285 9) (D64 9)   5  Antibiotic prophylaxis for dental procedure indicated due to prior joint replacement   (V58 62) (Z79 2)   6  Atrial fibrillation (427 31) (I48 91)   7  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)   8  Benign hypertensive CKD (403 10) (I12 9)   9  Bilateral leg edema (782 3) (R60 0)   10  Bruit of left carotid artery (785 9) (R09 89)   11  Cataract (366 9) (H26 9)   12  Chronic diastolic congestive heart failure (428 32,428 0) (I50 32)   13  Chronic kidney disease, stage 3 (585 3) (N18 3)   14  Cocaine abuse in remission (305 63) (F14 10)   15  Cognitive changes (799 59) (R41 89)   16  Constipation (564 00) (K59 00)   17  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, without long-term    current use of insulin (250 61) (E10 49)   18  Depression with anxiety (300 4) (F41 8)   19  Diabetes mellitus with nephropathy (250 40,583 81) (E11 21)   20  Diabetic Gastropathy (537 9)   21  Diabetic polyneuropathy (250 60,357 2) (E11 42)   22  Diabetic retinopathy (250 50,362 01) (E11 319)   23  Diabetic Ulcer Of The Left Foot (250 80)   24  Diastolic dysfunction (332 9) (I51 9)   25  ROD (dyspnea on exertion) (786 09) (R06 09)   26  Drug-induced Essential Tremor (333 1)   27  Dysuria (788 1) (R30 0)   28   Encephalopathy (348 30) (G93 40)   29  Encounter for screening mammogram for breast cancer (V76 12) (Z12 31)   30  Esophagitis, reflux (530 11) (K21 0)   31  External Hemorrhoids (455 3)   32  FELIPE (generalized anxiety disorder) (300 02) (F41 1)   33  H/O kidney transplant (V42 0) (Z94 0)   34  Heart palpitations (785 1) (R00 2)   35  Hospital discharge follow-up (V67 59) (Z09)   36  Hyperlipidemia (272 4) (E78 5)   37  Hyperplastic colon polyp (211 3) (K63 5)   38  Hypertension (401 9) (I10)   39  Hypothyroidism (244 9) (E03 9)   40  Increased frequency of urination (788 41) (R35 0)   41  Increased white blood cell count (288 60) (D72 829)   42  Insomnia (780 52) (G47 00)   43  Irritable bowel syndrome (564 1) (K58 9)   44  Major depressive disorder, recurrent episode, in full remission (296 36) (F33 42)   45  Memory loss (780 93) (R41 3)   46  Memory loss or impairment (780 93) (R41 3)   47  Metabolic acidosis (250 5) (E87 2)   48  MGUS (monoclonal gammopathy of unknown significance) (273 1) (D47 2)   49  Neck pain (723 1) (M54 2)   50  Need for influenza vaccination (V04 81) (Z23)   51  Nonrheumatic aortic valve insufficiency (424 1) (I35 1)   52  OAB (overactive bladder) (596 51) (N32 81)   53  Osteopenia (733 90) (M85 80)   54  Osteoporosis (733 00) (M81 0)   55  Other calculus in bladder (594 1) (N21 0)   56  Peripheral vascular disease (443 9) (I73 9)   57  Post traumatic stress disorder (309 81) (F43 10)   58  Preop general physical exam (V72 83) (Z01 818)   59  Preoperative cardiovascular examination (V72 81) (Z01 810)   60  Proteinuria (791 0) (R80 9)   61  Rectal polyp (569 0) (K62 1)   62  Recurrent UTI (599 0) (N39 0)   63  Restless legs syndrome (333 94) (G25 81)   64  Secondary hyperparathyroidism, renal (588 81) (N25 81)   65  Sinus Tachycardia (427 89)   66  SOB (shortness of breath) (786 05) (R06 02)   67  Tremor (781 0) (R25 1)   68  Unsteady gait (781 2) (R26 81)    Current Meds   1   AmLODIPine Besylate 10 MG Oral Tablet; take 1/2 tab  BID; Therapy: 53DFU4700 to (Evaluate:30Apr2017)  Requested for: 89VZG1975 Recorded   2  ARIPiprazole 20 MG Oral Tablet; TAKE 1 TABLET AT BEDTIME; Therapy: 93HVZ1890 to (Evaluate:07Feb2017)  Requested for: 94PCY6866; Last   Rx:09Nov2016 Ordered   3  Aspirin 81 MG Oral Tablet Chewable; Therapy: (Recorded:07Oct2016) to Recorded   4  Aspirin 81 MG TABS; TAKE 1 TABLET DAILY; Therapy: 67MWE8994 to (Evaluate:19Jun2016) Recorded   5  Caltrate 600 TABS; TAKE 1 TABLET TWICE DAILY; Therapy: (Recorded:17Jun2016) to Recorded   6  Catapres 0 1 MG Oral Tablet (CloNIDine HCl); take 3 tab  in am, 2 tab at noon, 3 tab  at   night; Therapy: 34KGV0972 to  Requested for: 04COG7599 Recorded   7  Clindamycin HCl - 300 MG Oral Capsule; TAKE 2 CAPSULES 1 HOUR PRIOR TO   DENTAL APPOINTMENT; Therapy: 74IFM0554 to (Evaluate:21Oct2016)  Requested for: 39EHF7555; Last   Rx:20Oct2016 Ordered   8  Doxazosin Mesylate 1 MG Oral Tablet; TAKE 1 TABLET AT BEDTIME; Therapy: (Recorded:17Jun2016) to Recorded   9  DULoxetine HCl - 60 MG Oral Capsule Delayed Release Particles (Cymbalta); TAKE 1   CAPSULE TWICE DAILY; Therapy: 16MJC2830 to (Evaluate:07Feb2017)  Requested for: 23TQA8882; Last   Rx:09Nov2016 Ordered   10  Folic Acid 1 MG Oral Tablet; TAKE 1 TABLET DAILY AS DIRECTED; Therapy: 62XTV2135 to (Evaluate:07Oct2017)  Requested for: 27EIT7742; Last    Rx:12Oct2016 Ordered   11  Furosemide 40 MG Oral Tablet; TAKE 1 TO 2 TABLETS DAILY AS NEEDED FOR    EDEMA; Therapy: 39MBT5119 to (Evaluate:25Jun2017)  Requested for: 17BBX9810; Last    Rx:30Jun2016 Ordered   12  HydrALAZINE HCl - 50 MG Oral Tablet; TAKE 1 TABLET 3 TIMES DAILY; Therapy: 29WZZ4635 to (Evaluate:26Jan2017)  Requested for: 45GVO5259; Last    Rx:43Fup9756 Ordered   13  Hydrocodone-Acetaminophen 5-325 MG Oral Tablet; Therapy: (Recorded:07Oct2016) to Recorded   14  Keflex 500 MG Oral Capsule (Cephalexin);     Therapy: (Recorded:07Oct2016) to Recorded   15  Lactulose 10 GM/15ML Oral Solution; TAKE 30 ML DAILY; Therapy: (Recorded:17Jun2016) to Recorded   16  Levothyroxine Sodium 88 MCG Oral Tablet; take 1 tablet by mouth daily; Therapy: 41NFR0807 to (Milad Elizondo)  Requested for: 10Hzf9258; Last    Rx:96Hfp9612 Ordered   17  LORazepam 2 MG Oral Tablet; TAKE 1 TABLET TWICE DAILY AS NEEDED; Therapy: 01VSO5251 to (Evaluate:15Jan2017)  Requested for: 37TFR1533; Last    Rx:17Oct2016; Status: ACTIVE - Renewal Denied Ordered   18  Magnesium 200 MG Oral Tablet; Therapy: 15EPQ9419 to Recorded   19  Magnesium TABS; Therapy: (Recorded:07Oct2016) to Recorded   20  Metoprolol Tartrate 50 MG Oral Tablet; take 1 tablet by mouth twice daily; Therapy: 79JYN7710 to (Evaluate:09Jun2017)  Requested for: 07BUF7819; Last    Rx:14Jun2016 Ordered   21  OneTouch Ultra Blue In [employer]; TEST TWICE A DAY; Therapy: 79ITL5436 to (Evaluate:08Jul2016)  Requested for: 10ZJL6777; Last    Rx:10Mar2016 Ordered   22  OneTouch UltraSoft Lancets Miscellaneous; test twice daily; Therapy: 08OEB8085 to (Leila Midget)  Requested for: 49HBJ0191; Last    Rx:09Mar2016 Ordered   23  Pantoprazole Sodium 40 MG Oral Tablet Delayed Release; take 1 tablet by mouth every    day; Therapy: 30WCA4516 to (Evaluate:45Ynw7096)  Requested for: 43XOC5169; Last    Rx:48Rif4277 Ordered   24  Pravastatin Sodium 80 MG Oral Tablet; take 1 tablet by mouth every day; Therapy: 82EZG7592 to (Cruz Linares)  Requested for: 82MUD6114; Last    SN:94XCL2018 Ordered   25  PredniSONE 5 MG Oral Tablet; TAKE ONE AND 1/2 TABLETS BY MOUTH DAILY; Therapy: 18BXV8690 to (Rosa Isela Baeza)  Requested for: 61PAA8259; Last    Rx:82Agz4486 Ordered   26  Prograf 1 MG Oral Capsule (Tacrolimus); Take 4 in the morning and 3 in the evening; Therapy: (Recorded:20Sep2016) to Recorded   27  ROPINIRole HCl - 0 25 MG Oral Tablet; take 1 tablet by mouth twice daily;     Therapy: 21Jul2015 to (Evaluate:09Eny4241)  Requested for: 14SZD2614; Last    Rx:30Jun2016 Ordered   28  Sertraline HCl - 100 MG Oral Tablet; TAKE 2 TABLETS DAILY; Therapy: 26DOP9991 to (Evaluate:15Jan2017)  Requested for: 07HKZ7364; Last    Rx:17Oct2016 Ordered   29  Sodium Bicarbonate 650 MG Oral Tablet; Take one tablet daily; Therapy: 71UPC3721 to (Evaluate:12Apr2016)  Requested for: 49LYR4136 Recorded   30  TraZODone HCl - 150 MG Oral Tablet; TAKE 1 TABLET AT BEDTIME; Therapy: 32IBI7594 to (Evaluate:23Roq6916)  Requested for: 98BRL8786; Last    Rx:09Nov2016 Ordered   31  Vitamin D3 1000 UNIT Oral Capsule; TAKE 3 PILLS AT NOON BY MOUTH; Therapy: (Recorded:17Jun2016) to Recorded    Allergies    1  Penicillins   2  Morphine Derivatives   3  Duricef TABS   4   Myrbetriq BK22    Signatures   Electronically signed by : EDGAR Chacon ; Nov 10 2016  2:14PM EST

## 2018-01-18 NOTE — RESULT NOTES
Message   Call patient  Urine culture came back positive for E  coli bacteria which is resistant to Cipro, Levaquib,  Bactrim  Bacteria is  susceptible to Cephalosporins  Recommend patient to complete antibiotic therapy with Cephalexin as prescribed for 7 days total     If she continues  having discomfort with urination,  pressure,  burning I recommend urology consultation  If she would like to see urologist, I will  placed an order in EMR       Verified Results  (1) URINE CULTURE 25Apr2016 07:21HARIKA Funes     Test Name Result Flag Reference   CLINICAL REPORT (Report)     Test:        Urine culture  Specimen Type:   Urine  Specimen Date:   4/25/2016 7:21 AM  Result Date:    4/27/2016 9:37 AM  Result Status:   Final result  Resulting Lab:   [facility] [address] [place] [address]            Tel: [phone]                 CULTURE                                       ------------------                                   >100,000 cfu/ml Escherichia coli      SUSCEPTIBILITY                                   ------------------                                                       Escherichia coli  METHOD                 SARTHAK  -------------------------------------  --------------------------  AMPICILLIN ($$)             >16 00 ug/ml Resistant  AMPICILLIN + SULBACTAM ($)       16/8 ug/ml  Intermediate  AZTREONAM ($$$)             <=8 ug/ml   Susceptible  CEFAZOLIN ($)              <=8 00 ug/ml Susceptible  CIPROFLOXACIN ($)            >2 00 ug/ml  Resistant  GENTAMICIN ($$)             <=4 ug/ml   Susceptible  LEVOFLOXACIN ($)            >4 00 ug/ml  Resistant  NITROFURANTOIN             64 ug/ml   Intermediate  PIPERACILLIN + TAZOBACTAM ($$$)     <=16 ug/ml  Susceptible  TETRACYCLINE              >8 ug/ml   Resistant  TOBRAMYCIN ($)             <=4 ug/ml   Susceptible  TRIMETHOPRIM + SULFAMETHOXAZOLE ($$$)  >2/38 ug/ml  Resistant

## 2018-01-18 NOTE — RESULT NOTES
Message   reviewed labs  Cr slightly higher but within general baseline  tac pending  Verified Results  (1) BASIC METABOLIC PROFILE 79GWD0840 06:30AM Fransisca Arachno    Order Number: [id number]     Test Name Result Flag Reference   SODIUM 137 mmol/L  136-145   POTASSIUM 4 2 mmol/L  3 5-5 3   CHLORIDE 109 mmol/L H 100-108   CARBON DIOXIDE 20 mmol/L L 21-32   ANION GAP (CALC) 8 mmol/L  4-13   BLOOD UREA NITROGEN 45 mg/dL H 5-25   CREATININE 1 81 mg/dL H 0 60-1 30   Standardized to IDMS reference method   CALCIUM 9 0 mg/dL  8 3-10 1   eGFR 31 ml/min/1 73sq m     National Kidney Disease Education Program recommendations are as follows:  GFR calculation is accurate only with a steady state creatinine  Chronic Kidney disease less than 60 ml/min/1 73 sq  meters  Kidney failure less than 15 ml/min/1 73 sq  meters  GLUCOSE FASTING 100 mg/dL H 65-99   Specimen collection should occur prior to Sulfasalazine administration due to the potential for falsely depressed results  Specimen collection should occur prior to Sulfapyridine administration due to the potential for falsely elevated results       (1) CBC/ PLT (NO DIFF) 28Sep2017 06:30AM [facility]    Order Number: [id number]     Test Name Result Flag Reference   HEMATOCRIT 34 3 % L 34 8-46 1   HEMOGLOBIN 11 0 g/dL L 11 5-15 4   MCHC 32 1 g/dL  31 4-37 4   MCH 30 8 pg  26 8-34 3   MCV 96 fL  82-98   PLATELET COUNT 454 Thousands/uL  149-390   RBC COUNT 3 57 Million/uL L 3 81-5 12   RDW 16 7 % H 11 6-15 1   WBC COUNT 9 89 Thousand/uL  4 31-10 16   MPV 12 5 fL  8 9-12 7     (1) URINALYSIS (will reflex a microscopy if leukocytes, occult blood, protein or nitrites are not within normal limits) 28Sep2017 06:30AM [facility] Order Number: [id number]     Test Name Result Flag Reference   COLOR Yellow     CLARITY Cloudy     SPECIFIC GRAVITY UA 1 011  1 003-1 030   PH UA 7 0  4 5-8 0   LEUKOCYTE ESTERASE UA Large A Negative   NITRITE UA Negative  Negative   PROTEIN UA Trace mg/dl A Negative   GLUCOSE UA Negative mg/dl  Negative   KETONES UA Negative mg/dl  Negative   UROBILINOGEN UA 0 2 E U /dl  0 2, 1 0 E U /dl   BILIRUBIN UA Negative  Negative   BLOOD UA Negative  Negative   BACTERIA Occasional /hpf  None Seen, Occasional   EPITHELIAL CELLS Occasional /hpf  None Seen, Occasional   RBC UA None Seen /hpf  None Seen, 0-5   WBC UA 4-10 /hpf A None Seen, 0-5, 5-55, 5-65     (1) URINE PROTEIN CREATININE RATIO 49Lry9938 06:30AM Marcelle Pyle    Order Number: [id number]     Test Name Result Flag Reference   CREATININE URINE 36 6 mg/dL     URINE PROTEIN:CREATININE RATIO 1 56 H 0 00-0 10   URINE PROTEIN 57 mg/dL

## 2018-01-18 NOTE — MISCELLANEOUS
Message   Recorded as Task   Date: 05/10/2016 08:37 AM, Created By: Renata Simms   Task Name: Miscellaneous   Assigned To: Elaine Muñoz   Regarding Patient: Lauren Chapa, Status: In Progress   Comment:    Renata Simms - 10 May 2016 8:37 AM     TASK CREATED  Can you let patient know the prograf level we migdalia in the hospital was  a little on the low side  Dr Elan Perez just wants her to repeat it  ThanksJustin - 10 May 2016 11:08 AM     TASK REASSIGNED: Previously Assigned To Kanslerinrinne 45 - 10 May 2016 4:24 PM     TASK IN PROGRESS   DorisMaggi - 10 May 2016 4:25 PM     TASK EDITED  called and spoke with patient, she is aware of her prograf level  She has lab scripts already for a repeat check along with other tests  She will be going to get bloodwork done on thursday  Active Problems    1  Abnormal EKG (794 31) (R94 31)   2  Acid reflux disease (530 81) (K21 9)   3  Acute on chronic diastolic congestive heart failure (428 33,428 0) (I50 33)   4  Anemia (285 9) (D64 9)   5  Atrial fibrillation (427 31) (I48 91)   6  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)   7  Benign hypertensive CKD (403 10) (I12 9)   8  Bilateral leg edema (782 3) (R60 0)   9  Bruit of left carotid artery (785 9) (R09 89)   10  Cataract (366 9) (H26 9)   11  Chronic diastolic congestive heart failure (428 32,428 0) (I50 32)   12  Chronic kidney disease, stage 3 (585 3) (N18 3)   13  Cocaine abuse in remission (305 63) (F14 10)   14  Cognitive changes (799 59) (R41 89)   15  Constipation (564 00) (K59 00)   16  Depression with anxiety (300 4) (F41 8)   17  Diabetes mellitus with nephropathy (250 40,583 81) (E11 21)   18  Diabetic Gastropathy (537 9)   19  Diabetic polyneuropathy (250 60,357 2) (E11 42)   20  Diabetic retinopathy (250 50,362 01) (E11 319)   21  Diabetic Ulcer Of The Left Foot (250 80)   22  ROD (dyspnea on exertion) (786 09) (R06 09)   23   Drug-induced Essential Tremor (333 1)   24  Dysuria (788 1) (R30 0)   25  Encephalopathy (348 30) (G93 40)   26  Encounter for screening mammogram for breast cancer (V76 12) (Z12 31)   27  Esophagitis, reflux (530 11) (K21 0)   28  External Hemorrhoids (455 3)   29  FELIPE (generalized anxiety disorder) (300 02) (F41 1)   30  H/O kidney transplant (V42 0) (Z94 0)   31  Heart palpitations (785 1) (R00 2)   32  Hospital discharge follow-up (V67 59) (Z09)   33  Hyperlipidemia (272 4) (E78 5)   34  Hyperplastic colon polyp (211 3) (K63 5)   35  Hypertension (401 9) (I10)   36  Hypothyroidism (244 9) (E03 9)   37  Increased white blood cell count (288 60) (D72 829)   38  Insomnia (780 52) (G47 00)   39  Irritable bowel syndrome (564 1) (K58 9)   40  Major depressive disorder, recurrent episode, in full remission (296 36) (F33 42)   41  Memory loss (780 93) (R41 3)   42  Metabolic acidosis (299 4) (E87 2)   43  MGUS (monoclonal gammopathy of unknown significance) (273 1) (D47 2)   44  Neck pain (723 1) (M54 2)   45  Nonrheumatic aortic valve insufficiency (424 1) (I35 1)   46  Osteopenia (733 90) (M85 80)   47  Osteoporosis (733 00) (M81 0)   48  Peripheral vascular disease (443 9) (I73 9)   49  Post traumatic stress disorder (309 81) (F43 10)   50  Proteinuria (791 0) (R80 9)   51  Rectal polyp (569 0) (K62 1)   52  Restless legs syndrome (333 94) (G25 81)   53  Secondary hyperparathyroidism, renal (588 81) (N25 81)   54  Sinus Tachycardia (427 89)   55  SOB (shortness of breath) (786 05) (R06 02)   56  Tremor (781 0) (R25 1)   57  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 (549 56) (R93 28)   58  Unsteady gait (781 2) (R26 81)   59  Urinary tract infection (599 0) (N39 0)   60  UTI (urinary tract infection), bacterial (599 0,041 9) (N39 0,A49 9)   61  Vaginal itching (698 1) (L29 8)    Current Meds   1  AmLODIPine Besylate 5 MG Oral Tablet; TAKE 1 TABLET TWICE DAILY;    Therapy: 46WDO1694 to (Evaluate:30Apr2017)  Requested for: 80LXQ9134 Recorded   2  ARIPiprazole 20 MG Oral Tablet; TAKE 1 TABLET AT BEDTIME; Therapy: 13OOJ6305 to (Evaluate:16Aug2016)  Requested for: 44CVK6564; Last   Rx:06Gtm2091 Ordered   3  Aspirin 81 MG TABS; TAKE 1 TABLET DAILY; Therapy: 10DSB4821 to (Evaluate:19Jun2016) Recorded   4  Catapres 0 3 MG Oral Tablet; TAKE 0 3MG BY MOUTH 2 TIMES A DAY  INDICATIONS:   0 3MG IN AM, 0 2MG AT NOON, AND 0 3MG IN PM;   Therapy: 60WBP6425 to (Evaluate:30Apr2017)  Requested for: 36RKZ1854 Recorded   5  DULoxetine HCl - 60 MG Oral Capsule Delayed Release Particles; TAKE 1 CAPSULE   TWICE DAILY; Therapy: 18UNL8125 to (Evaluate:09Aug2016)  Requested for: 50SDZ7582; Last   Rx:55Yrr6858 Ordered   6  Folic Acid 1 MG Oral Tablet; TAKE 1 TABLET DAILY AS DIRECTED; Therapy: 32XZC8936 to (Evaluate:14Sep2016)  Requested for: 78KAW0981; Last   Rx:36Fpt4825 Ordered   7  Furosemide 40 MG Oral Tablet; Take one tablet every morning and 1/2 tablet every   evening; Therapy: 68APX1744 to (Martha Barthel)  Requested for: 71JKY4030 Recorded   8  HydrALAZINE HCl - 50 MG Oral Tablet; TAKE 1 TABLET 3 TIMES DAILY; Therapy: 26KWE2985 to (Evaluate:26Jan2017)  Requested for: 88AFK2120; Last   Rx:26Gae1327 Ordered   9  Keflex 500 MG Oral Capsule; TAKE 1 CAPSULE BY MOUTH EVERY 6 HOURS FOR 9   DAYS; Therapy: (Recorded:81Qvu8985) to Recorded   10  Levothyroxine Sodium 75 MCG Oral Tablet; TAKE 1 TABLET DAILY AS DIRECTED; Therapy: 56QMM0114 to  Requested for: 20AFX2663 Recorded   11  LORazepam 2 MG Oral Tablet; TAKE 1 TABLET TWICE DAILY AS NEEDED; Therapy: 77IUU7919 to (Evaluate:09Aug2016); Last Rx:12Waa9704 Ordered   12  Metoprolol Tartrate 50 MG Oral Tablet; take 1 tablet by mouth twice daily; Therapy: 54OOW4592 to (Evaluate:06Jun2016)  Requested for: 22SBZ9386; Last    Rx:08Mar2016 Ordered   13  [facility]; TEST TWICE A DAY;     Therapy: 25SWN6802 to (Evaluate:55Zgb8009)  Requested for: 39LFY8416; Last Rx:10Mar2016 Ordered   14  OneTouch UltraSoft Lancets Miscellaneous; test twice daily; Therapy: 36IBE4372 to (nOdina Small)  Requested for: 51LQW5014; Last    Rx:09Mar2016 Ordered   15  Pantoprazole Sodium 40 MG Oral Tablet Delayed Release; TAKE 1 TABLET DAILY; Therapy: 18OKC1699 to (Maverick Paige)  Requested for: 08KUY8370; Last    Rx:23Yvk1324 Ordered   16  Pravastatin Sodium 80 MG Oral Tablet; take 1 tablet by mouth every day; Therapy: 49JDN5463 to (Javy Mixer)  Requested for: 10VNU9540; Last    UE:98NBI8605 Ordered   17  PredniSONE 5 MG Oral Tablet; TAKE ONE AND 1/2 TABLETS BY MOUTH DAILY; Therapy: 78JID7777 to (Mely Blackmon)  Requested for: 70TSV4324; Last    Rx:27Apr2016 Ordered   18  Prograf 1 MG Oral Capsule; Therapy: (Recorded:10May2016) to Recorded   19  ROPINIRole HCl - 0 25 MG Oral Tablet; take 1 tablet by mouth twice daily; Therapy: 34CKY6032 to (Evaluate:85Twq5553)  Requested for: 73JHW0516; Last    Rx:22Mar2016 Ordered   20  Sertraline HCl - 100 MG Oral Tablet; TAKE 2 TABLETS DAILY; Therapy: 38DFZ0477 to (Evaluate:30Ulj1980)  Requested for: 65WVL5889; Last    Rx:39Bsh6003 Ordered   21  Sodium Bicarbonate 650 MG Oral Tablet; Take one tablet daily; Therapy: 31GMU3120 to (Evaluate:12Apr2016)  Requested for: 07NGX4916 Recorded   22  Sucralfate 1 GM/10ML Oral Suspension; TAKE 10 ML EVERY 12 HOURS DAILY; Therapy: (Recorded:10May2016) to Recorded   23  TraZODone HCl - 150 MG Oral Tablet; TAKE 1 TABLET AT BEDTIME; Therapy: 66YXF7249 to (Evaluate:08Tlh8682)  Requested for: 77MSG8085; Last    Rx:34Lzd4282 Ordered   24  Tylenol 325 MG Oral Tablet; Therapy: (Recorded:42Bbp1044) to Recorded   25  Vitamin D3 3000 UNIT Oral Tablet; 1 TAB DAILY; Therapy: 50CGG7164 to (Evaluate:39Cyk3043) Recorded    Allergies    1  Penicillins   2  Morphine Derivatives   3   [address] TABS    Signatures   Electronically signed by : Ankita Crenshaw [facility]; May 11 2016 10:20AM EST (Author)

## 2018-01-18 NOTE — MISCELLANEOUS
Message  reviewed chart and discussed with patient   pt has smoldering myeloma    - start taper of prednisone to 5 mg alternating with 7 5 mg (was on 7 5 mg daily for close to 20 years)  - repeat BMP, CBC, Tac this week  goal tac 4  - i will review with our transplant team  as there may be utility to biopsy kidney and if positive for myeloma, may need to treat  - will review with Hematology also      Plan  H/O kidney transplant    · From  PredniSONE 5 MG Oral Tablet TAKE 1 1/2 TABLETS BY MOUTH EVERY  DAY To PredniSONE 5 MG Oral Tablet TAKE 1 tablet alternating with 1 5 tablets on next  day    Signatures   Electronically signed by : EDGAR Frausto ; Oct  2 2017  2:13PM EST                       (Author)
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
## 2018-01-19 ENCOUNTER — GENERIC CONVERSION - ENCOUNTER (OUTPATIENT)
Dept: OTHER | Facility: OTHER | Age: 53
End: 2018-01-19

## 2018-01-22 ENCOUNTER — TRANSCRIBE ORDERS (OUTPATIENT)
Dept: ADMINISTRATIVE | Age: 53
End: 2018-01-22

## 2018-01-22 ENCOUNTER — GENERIC CONVERSION - ENCOUNTER (OUTPATIENT)
Dept: OTHER | Facility: OTHER | Age: 53
End: 2018-01-22

## 2018-01-22 ENCOUNTER — APPOINTMENT (OUTPATIENT)
Dept: LAB | Age: 53
End: 2018-01-22
Payer: MEDICARE

## 2018-01-22 VITALS
HEART RATE: 81 BPM | HEIGHT: 68 IN | DIASTOLIC BLOOD PRESSURE: 68 MMHG | SYSTOLIC BLOOD PRESSURE: 156 MMHG | WEIGHT: 228 LBS | BODY MASS INDEX: 34.56 KG/M2

## 2018-01-22 VITALS
RESPIRATION RATE: 22 BRPM | HEART RATE: 78 BPM | DIASTOLIC BLOOD PRESSURE: 72 MMHG | WEIGHT: 214.8 LBS | TEMPERATURE: 97.1 F | OXYGEN SATURATION: 95 % | HEIGHT: 68 IN | SYSTOLIC BLOOD PRESSURE: 178 MMHG | BODY MASS INDEX: 32.55 KG/M2

## 2018-01-22 DIAGNOSIS — Z94.0 KIDNEY REPLACED BY TRANSPLANT: Primary | ICD-10-CM

## 2018-01-22 DIAGNOSIS — E03.9 HYPOTHYROIDISM: ICD-10-CM

## 2018-01-22 DIAGNOSIS — Z94.0 KIDNEY REPLACED BY TRANSPLANT: ICD-10-CM

## 2018-01-22 LAB
ANION GAP SERPL CALCULATED.3IONS-SCNC: 8 MMOL/L (ref 4–13)
BACTERIA UR QL AUTO: ABNORMAL /HPF
BILIRUB UR QL STRIP: NEGATIVE
BUN SERPL-MCNC: 41 MG/DL (ref 5–25)
CALCIUM SERPL-MCNC: 8.9 MG/DL (ref 8.3–10.1)
CHLORIDE SERPL-SCNC: 111 MMOL/L (ref 100–108)
CLARITY UR: ABNORMAL
CO2 SERPL-SCNC: 20 MMOL/L (ref 21–32)
COLOR UR: YELLOW
CREAT SERPL-MCNC: 1.73 MG/DL (ref 0.6–1.3)
CREAT UR-MCNC: 43 MG/DL
ERYTHROCYTE [DISTWIDTH] IN BLOOD BY AUTOMATED COUNT: 17.7 % (ref 11.6–15.1)
GFR SERPL CREATININE-BSD FRML MDRD: 33 ML/MIN/1.73SQ M
GLUCOSE P FAST SERPL-MCNC: 91 MG/DL (ref 65–99)
GLUCOSE UR STRIP-MCNC: NEGATIVE MG/DL
HCT VFR BLD AUTO: 29.3 % (ref 34.8–46.1)
HGB BLD-MCNC: 8.8 G/DL (ref 11.5–15.4)
HGB UR QL STRIP.AUTO: ABNORMAL
HYALINE CASTS #/AREA URNS LPF: ABNORMAL /LPF
KETONES UR STRIP-MCNC: NEGATIVE MG/DL
LEUKOCYTE ESTERASE UR QL STRIP: ABNORMAL
MCH RBC QN AUTO: 26.4 PG (ref 26.8–34.3)
MCHC RBC AUTO-ENTMCNC: 30 G/DL (ref 31.4–37.4)
MCV RBC AUTO: 88 FL (ref 82–98)
NITRITE UR QL STRIP: POSITIVE
NON-SQ EPI CELLS URNS QL MICRO: ABNORMAL /HPF
PH UR STRIP.AUTO: 7 [PH] (ref 4.5–8)
PLATELET # BLD AUTO: 275 THOUSANDS/UL (ref 149–390)
PMV BLD AUTO: 12.2 FL (ref 8.9–12.7)
POTASSIUM SERPL-SCNC: 4.1 MMOL/L (ref 3.5–5.3)
PROT UR STRIP-MCNC: ABNORMAL MG/DL
PROT UR-MCNC: 89 MG/DL
PROT/CREAT UR: 2.07 MG/G{CREAT} (ref 0–0.1)
RBC # BLD AUTO: 3.33 MILLION/UL (ref 3.81–5.12)
RBC #/AREA URNS AUTO: ABNORMAL /HPF
SODIUM SERPL-SCNC: 139 MMOL/L (ref 136–145)
SP GR UR STRIP.AUTO: 1.01 (ref 1–1.03)
T4 FREE SERPL-MCNC: 0.82 NG/DL (ref 0.76–1.46)
TSH SERPL DL<=0.05 MIU/L-ACNC: 4.97 UIU/ML (ref 0.36–3.74)
UROBILINOGEN UR QL STRIP.AUTO: 0.2 E.U./DL
WBC # BLD AUTO: 8.95 THOUSAND/UL (ref 4.31–10.16)
WBC #/AREA URNS AUTO: ABNORMAL /HPF

## 2018-01-22 PROCEDURE — 84439 ASSAY OF FREE THYROXINE: CPT

## 2018-01-22 PROCEDURE — 80048 BASIC METABOLIC PNL TOTAL CA: CPT

## 2018-01-22 PROCEDURE — 84156 ASSAY OF PROTEIN URINE: CPT | Performed by: INTERNAL MEDICINE

## 2018-01-22 PROCEDURE — 81001 URINALYSIS AUTO W/SCOPE: CPT | Performed by: INTERNAL MEDICINE

## 2018-01-22 PROCEDURE — 80197 ASSAY OF TACROLIMUS: CPT

## 2018-01-22 PROCEDURE — 82570 ASSAY OF URINE CREATININE: CPT | Performed by: INTERNAL MEDICINE

## 2018-01-22 PROCEDURE — 85027 COMPLETE CBC AUTOMATED: CPT

## 2018-01-22 PROCEDURE — 84443 ASSAY THYROID STIM HORMONE: CPT

## 2018-01-22 PROCEDURE — 36415 COLL VENOUS BLD VENIPUNCTURE: CPT

## 2018-01-23 ENCOUNTER — GENERIC CONVERSION - ENCOUNTER (OUTPATIENT)
Dept: OTHER | Facility: OTHER | Age: 53
End: 2018-01-23

## 2018-01-23 PROBLEM — E11.21 DIABETIC NEPHROPATHY (HCC): Status: ACTIVE | Noted: 2018-01-23

## 2018-01-23 PROBLEM — T86.891: Status: ACTIVE | Noted: 2017-08-03

## 2018-01-23 PROBLEM — F43.12 POST-TRAUMATIC STRESS DISORDER, CHRONIC: Status: ACTIVE | Noted: 2017-10-25

## 2018-01-23 PROBLEM — K59.09 CHRONIC CONSTIPATION: Status: ACTIVE | Noted: 2017-11-18

## 2018-01-23 PROBLEM — F33.42 RECURRENT MAJOR DEPRESSIVE EPISODES, IN FULL REMISSION (HCC): Status: ACTIVE | Noted: 2017-03-02

## 2018-01-23 PROBLEM — G47.09 OTHER INSOMNIA: Status: ACTIVE | Noted: 2017-10-25

## 2018-01-23 PROBLEM — R09.89 BILATERAL CAROTID BRUITS: Status: ACTIVE | Noted: 2017-06-02

## 2018-01-23 PROBLEM — C90.00 INDOLENT MULTIPLE MYELOMA (HCC): Status: ACTIVE | Noted: 2017-10-02

## 2018-01-23 LAB — TACROLIMUS BLD-MCNC: 5.2 NG/ML (ref 2–20)

## 2018-01-23 NOTE — MISCELLANEOUS
Assessment   1  Pyelonephritis (590 80) (N12)  2  Weakness (780 79) (R53 1)  3  H/O kidney transplant (V42 0) (Z94 0)  4  Chronic kidney disease, stage 4 (severe) (585 4) (N18 4)  5  Benign hypertensive CKD (403 10) (I12 9)  6  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, without long-term   current use of insulin (250 61) (E10 49)  7  Hyperlipidemia (272 4) (E78 5)  8  Hypothyroidism (244 9) (E03 9)    Plan  Benign hypertensive CKD    · Continue: AmLODIPine Besylate 10 MG Oral Tablet; take 1/2 tab  BID  Rx By: Carla Magallanes; Dispense: 0 Days ; #:30 Tablet; Refill: 6;For: Benign   hypertensive CKD; JACK = N; Verified Transmission to [place] [address]   [id number]   · Continue: Doxazosin Mesylate 1 MG Oral Tablet; TAKE 1 TABLET Bedtime  Rx By: Oddis Simmonds; Dispense: 90 Days ; #:90 Tablet; Refill: 3;For: Benign hypertensive   CKD; JACK = N; Verified Transmission to [address] [place] [address]; Last   Updated By: Carla Magallanes; 4/20/2017 1:23:56 PM   · Continue: Metoprolol Tartrate 50 MG Oral Tablet; take 1 tablet by mouth twice daily  Rx By: Oddis Simmonds; Dispense: 90 Days ; #:180 X 180 Tablet Bottle; Refill: 3;For:   Benign hypertensive CKD; JACK = N; Verified Transmission to [address] [place] [address]; Last Updated By: Carla Magallanes; 6/14/2016 3:38:59 PM  Hyperlipidemia    · Continue: Pravastatin Sodium 80 MG Oral Tablet; take 1 tablet by mouth every day  Rx By: Carla Magallanes; Dispense: 60 Days ; #:90 TAB; Refill: 3;For: Hyperlipidemia;   JACK = N; Verified Transmission to [place] [id number]  Hypothyroidism    · Continue: Levothyroxine Sodium 88 MCG Oral Tablet; take 1 tablet by mouth daily  Rx By: Carla Magallanes; Dispense: 30 Days ; #:90 TAB; Refill: 3;For: Hypothyroidism;   JACK = N; Verified Transmission to [place] [id number]    Discussion/Summary  Discussion Summary:   Patient presents for [facility] visit       She was hospitalized at [place] [address] [facility] 6/14/17 - 6/17/17 for Pyelonephritis, generalized weakness  Patient's condition improved  Abdominal pain resolved  No dysuria no fever  She will complete antibiotic therapy with Keflex 500 mg twice daily tomorrow  Followup with nephrologist Dr Tan Elizabeth on July 6, 2017  Will continue all current medications  Continue to monitor blood sugar at home  Recommended to stay well hydrated, monitor BP at home daily  Keep followup office visit as scheduled in October 2017  Call office with any acute problems or concerns  Counseling Documentation With Imm: The patient was counseled regarding diagnostic results, instructions for management, risk factor reductions, risks and benefits of treatment options, importance of compliance with treatment  Medication SE Review and Pt Understands Tx: Possible side effects of new medications were reviewed with the patient/guardian today  The treatment plan was reviewed with the patient/guardian  The patient/guardian understands and agrees with the treatment plan      Chief Complaint  Chief Complaint Free Text Note Form: Generalized weakness, abdominal pain      History of Present Illness  [facility] Communication [facility]: The patient is being contacted for follow-up after hospitalization  She was hospitalized at [facility]  The date of admission: 06/14/2017, date of discharge: 06/17/2017  She was discharged to home  She scheduled a follow up appointment  Counseling was provided to the patient  Communication performed and completed by EDGAR Melara    HPI: Patient presents for [facility] visit  Patient was hospitalized at [address] [place] [address] 6/14/17 - 6/17/17  She presented to the emergency room with left -sided lower abdominal pain, generalized weakness  She was diagnosed with pyelonephritis, started on IV Ceftriaxone  Urine culture was positive for E  coli  Patient's condition has improved   She remained afebrile and was discharged home on June 17, 2017 with instructions to complete Abx with Keflex 500 mg BID for 5 days and follow-up with nephrology  Her chronic medical conditions remained stable  Patient will followup with nephrologist Dr Zabrina Aguirre on 7/6/17  Reviewed all hospital records, test results, discharge medications with patient  Blood work done on 6/16/17 showed potassium 3 6, creatinine 1 63, Hemoglobin 11 1  Patient denies abdominal pain  No dysuria  No fever or chills  Feels tired  Patient has complicated medical history, including HTN, Hyperlipidemia, CHF, CKD stage 4, H/o kidney transplant in 1998, Hypothyroidism , Type 1 DM with diabetic neuropathy, Anemia, Depression, Anxiety  HTN - BP remains stable  Patient monitors blood pressure at home  Denies CP, SOB, dizziness  DM - patient monitors blood sugar at home daily  Blood sugar ranges 98 -140"s  Patient has been followed by ophthalmologist Dr Mercer  She is seeing podiatrist Dr Joseph Grossman for routine podiatry care monthly  C/o numbness, tingling in feet  She ambulates with a cane  Denies falls  Patient had Carotid Doppler done on June 20, 2017 which showed < 50% stenosis in the R ICA  There is no evidence of significant stenosis in L ICA  Patient takes ASA 81 mg daily, statin  Hypothyroidism - currently taking Levothyroxine 88 mcg daily  Hyperlipidemia- patient takes Pravastatin 80 mg daily  No side effects from statin therapy  Denies tobacco use  Review of Systems  Complete-Female:   Constitutional: feeling tired, but no fever and no chills  Lost 11 lb since 5/17  Eyes: wears glasses, but no eye pain, no dryness of the eyes, eyes not red, no purulent discharge from the eyes and no itching of the eyes  No visual disturbances  ENT: no earache, no nosebleeds, no sore throat, no hearing loss, no nasal discharge and no hoarseness     Cardiovascular: no chest pain, no intermittent leg claudication, no palpitations and no lower extremity edema  Respiratory: no shortness of breath and no wheezing    The patient presents with complaints of no cough  Gastrointestinal: constipation, but no abdominal pain, no nausea, no vomiting, no diarrhea and no blood in stools  No heartburn  Genitourinary: no dysuria, no pelvic pain and no incontinence  Musculoskeletal: arthralgias, but no joint swelling and no myalgias  Integumentary: no rashes, no itching and no skin wound  Neurological: tingling, numbness in legs, but no headache    The patient presents with complaints of no dizziness  Psychiatric: anxiety, sleep disturbances, depression and symptoms are stable, but not suicidal    Hematologic/Lymphatic: a tendency for easy bruising, but no swollen glands, no tendency for easy bleeding and no swollen glands in the neck  Active Problems   1  Abnormal EKG (794 31) (R94 31)  2  Acid reflux disease (530 81) (K21 9)  3  Acute kidney injury superimposed on CKD (866 00,585 9) (N17 9,N18 9)  4  Anemia (285 9) (D64 9)  5  Antibiotic prophylaxis for dental procedure indicated due to prior joint replacement   (V58 62) (Z79 2)  6  Atrial fibrillation (427 31) (I48 91)  7  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)  8  Benign hypertensive CKD (403 10) (I12 9)  9  Bilateral carotid bruits (785 9) (R09 89)  10  Bilateral leg edema (782 3) (R60 0)  11  Bruit of left carotid artery (785 9) (R09 89)  12  Cataract (366 9) (H26 9)  13  Chronic constipation (564 00) (K59 09)  14  Chronic diastolic congestive heart failure (428 32,428 0) (I50 32)  15  Chronic kidney disease, stage 4 (severe) (585 4) (N18 4)  16  Cocaine abuse in remission (305 63) (F14 10)  17  Cognitive changes (799 59) (R41 89)  18  Colon cancer screening (V76 51) (Z12 11)  19  Constipation (564 00) (K59 00)  20  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, without long-term    current use of insulin (250 61) (E10 49)  21   Diabetes mellitus with diabetic nephropathy (250 40,583 81) (E11 21)  22  Diabetic Gastropathy (537 9)  23  Diabetic polyneuropathy (250 60,357 2) (E11 42)  24  Diabetic retinopathy (250 50,362 01) (E11 319)  25  Diabetic Ulcer Of The Left Foot (250 80)  26  Diastolic dysfunction (012 1) (I51 9)  27  ROD (dyspnea on exertion) (786 09) (R06 09)  28  Drug-induced Essential Tremor (333 1)  29  Encounter for screening mammogram for breast cancer (V76 12) (Z12 31)  30  Esophagitis, reflux (530 11) (K21 0)  31  External Hemorrhoids (455 3)  32  Fatigue (780 79) (R53 83)  33  FELIPE (generalized anxiety disorder) (300 02) (F41 1)  34  H/O kidney transplant (V42 0) (Z94 0)  35  Heart palpitations (785 1) (R00 2)  36  Hospital discharge follow-up (V67 59) (Z09)  40  Hyperlipidemia (272 4) (E78 5)  38  Hyperplastic colon polyp (211 3) (K63 5)  39  Hypertension (401 9) (I10)  40  Hyponatremia (276 1) (E87 1)  41  Hypothyroidism (244 9) (E03 9)  42  Increased frequency of urination (788 41) (R35 0)  43  Increased white blood cell count (288 60) (D72 829)  44  Insomnia (780 52) (G47 00)  45  Irritable bowel syndrome (564 1) (K58 9)  46  Major depressive disorder, recurrent episode, in full remission (296 36) (F33 42)  47  Memory loss (780 93) (R41 3)  48  Memory loss or impairment (780 93) (R41 3)  49  Metabolic acidosis (309 4) (E87 2)  50  MGUS (monoclonal gammopathy of unknown significance) (273 1) (D47 2)  51  Need for influenza vaccination (V04 81) (Z23)  52  Nonrheumatic aortic valve insufficiency (424 1) (I35 1)  53  OAB (overactive bladder) (596 51) (N32 81)  54  Osteopenia (733 90) (M85 80)  55  Osteoporosis (733 00) (M81 0)  56  Other calculus in bladder (594 1) (N21 0)  57  Peripheral vascular disease (443 9) (I73 9)  58  Post traumatic stress disorder (309 81) (F43 10)  59  Preop general physical exam (V72 83) (Z01 818)  60  Preoperative cardiovascular examination (V72 81) (Z01 810)  61  Proteinuria (791 0) (R80 9)  62   Rectal polyp (569 0) (K62 1)  63  Recurrent UTI (599 0) (N39 0)  64  Restless legs syndrome (333 94) (G25 81)  65  Secondary hyperparathyroidism, renal (588 81) (N25 81)  66  Snoring (786 09) (R06 83)  67  SOB (shortness of breath) (786 05) (R06 02)  68  Tremor (781 0) (R25 1)  69  Unsteady gait (781 2) (R26 81)    Past Medical History   1  History of Abnormal Liver Function Test (790 6)  2  History of Abnormal urine (791 9) (R82 90)  3  History of Abscess of buttock, right (682 5) (L02 31)  4  History of Acute kidney injury (584 9) (N17 9)  5  History of Acute on chronic diastolic congestive heart failure (428 33,428 0) (I50 33)  6  History of Bilateral impacted cerumen (380 4) (H61 23)  7  History of Cervical Dysplasia (V13 22)  8  History of Denial Of Any Significant Medical History  9  History of Diabetes mellitus with foot ulcer (250 80,707 15) (E11 621,L97 509)  10  History of allergic urticaria (V13 3) (Z87 2)  11  History of cholelithiasis (V12 79) (Z87 19)  12  History of dysuria (V13 00) (Z87 898)  13  History of encephalopathy (V12 49) (Z86 69)  14  History of gastritis (V12 79) (Z87 19)  15  Denied: History of head injury  16  History of hematuria (V13 09) (Z87 448)  17  History of hyperkalemia (V12 29) (Z86 39)  18  History of pneumonia (V12 61) (Z87 01)  19  History of seborrhea (V13 3) (Z87 2)  20  History of urinary tract infection (V13 02) (Z87 440)  21  History of urinary tract infection (V13 02) (Z87 440)  22  History of Iliotibial band syndrome (728 89) (M76 30)  23  History of Infected tooth (522 4) (K04 7)  24  History of Lumbar radiculopathy (724 4) (M54 16)  25  History of Neck pain (723 1) (M54 2)  26  History of Nonrheumatic aortic valve insufficiency (424 1) (I35 1)  27  Denied: History of Seizures  28  History of Sinus Tachycardia (427 89)  29  History of Trochanteric bursitis, unspecified laterality (726 5) (M70 60)  30  History of Urinary Tract Infection (V13 02)  31   History of UTI (urinary tract infection), bacterial (599 0,041 9) (N39 0,A49 9)  32  History of Vaginal itching (698 1) (L29 8)    Surgical History   1  History of Cataract Surgery  2  History of Cholecystectomy  3  History of Complete Colonoscopy  4  History of Complete Colonoscopy  5  History of Diagnostic Esophagogastroduodenoscopy  6  History of Diagnostic Esophagogastroduodenoscopy  7  History of Dilation And Curettage  8  History of Foot Surgery  9  History of Pancreatic Transplantation  10  Renal Transplant  11  History of Surgery Left Foot Amputation  Surgical History Reviewed: The surgical history was reviewed and updated today  Family History  Mother   1  No family history of mental disorder  Father   2  Family history of cancer (V16 9) (Z80 9)  3  No family history of mental disorder  Sister   4  Family history of depression (V17 0) (Z81 8)  Grandparent   5  Family history of cancer (V16 9) (Z80 9)  Maternal Grandmother   6  Family history of Breast Cancer (V16 3)    Social History    · Denied: History of Alcohol Use (History)   · Former smoker (C54 00) (M08 058)   · History of Uses cocaine  Social History Reviewed: The social history was reviewed and updated today  Current Meds  1  AmLODIPine Besylate 10 MG Oral Tablet; take 1/2 tab  BID; Therapy: 39XVI1210 to (Last Rx:07Apr2017)  Requested for: 07Apr2017 Ordered  2  ARIPiprazole 30 MG Oral Tablet; Take 1 tablet by mouth at bedtime; Therapy: 43RKW1360 to (Evaluate:54Sqq4931)  Requested for: 34ARK3766; Last   Rx:23May2017 Ordered  3  Aspirin 81 MG TABS; TAKE 1 TABLET DAILY; Therapy: 03PPG8049 to (Evaluate:19Jun2016) Recorded  4  Catapres 0 1 MG Oral Tablet; take 3 tab  in am, 2 tab at noon, 3 tab  at night; Therapy: 40WLO9059 to (Last Rx:22Mar2017)  Requested for: 23Mar2017 Ordered  5  Clindamycin HCl - 300 MG Oral Capsule; TAKE 2 CAPSULES 1 HOUR PRIOR TO   DENTAL APPOINTMENT;    Therapy: 91FCX2884 to (Ivy Bazan)  Requested for: 13Apr2017; Last Rx:07Apr2017 Ordered  6  Doxazosin Mesylate 1 MG Oral Tablet; TAKE 1 TABLET Bedtime  Requested for:   40Vav7919; Last Rx:05Apr2017 Ordered  7  DULoxetine HCl - 60 MG Oral Capsule Delayed Release Particles; TAKE 1 CAPSULE   TWICE DAILY; Therapy: 46RFQ0719 to (Evaluate:35Yqd5525)  Requested for: 16EQG3643; Last   Rx:02Mar2017 Ordered  8  Flonase Allergy Relief 50 MCG/ACT Nasal Suspension; USE 2 SPRAYS IN EACH   NOSTRIL ONCE DAILY; Therapy: 63TWL0888 to Recorded  9  Folic Acid 1 MG Oral Tablet; TAKE 1 TABLET DAILY AS DIRECTED; Therapy: 52HGQ2203 to (Evaluate:07Oct2017)  Requested for: 23AVR2421; Last   Rx:12Oct2016 Ordered  10  Furosemide 20 MG Oral Tablet; take 1/2 tab  daily; Therapy: 67Xmg7345 to Recorded  11  Generlac 10 GM/15ML Oral Solution; TAKE 30 ML BY MOUTH daily; Therapy: 27FII6436 to (Geoffrey Velasquez)  Requested for: 05KQY9535; Last    Rx:02Jun2017 Ordered  12  HydrALAZINE HCl - 50 MG Oral Tablet; TAKE 1 TABLET 3 TIMES DAILY; Therapy: 38HGX4968 to (Bandar Payne)  Requested for: 67KFG6529; Last    Rx:13Mar2017 Ordered  13  Lactulose 10 GM/15ML Oral Solution; TAKE 30 ML DAILY; Therapy: 26PZK1173 to (Last Rx:02Jun2017)  Requested for: 02Jun2017 Ordered  14  Levothyroxine Sodium 88 MCG Oral Tablet; take 1 tablet by mouth daily; Therapy: 03PDT7631 to (Evaluate:15Mar2017)  Requested for: 19NDZ6008; Last    Rx:86Bfc4383 Ordered  15  LORazepam 2 MG Oral Tablet; TAKE 1 TABLET 3 TIMES DAILY AS NEEDED; Therapy: 15CHQ3662 to (Evaluate:14Wht8804)  Requested for: 22QKP7801; Last    Rx:16Nov2016 Ordered  16  Magnesium 200 MG Oral Tablet; Therapy: 49WDJ5789 to Recorded  17  Metoprolol Tartrate 50 MG Oral Tablet; take 1 tablet by mouth twice daily; Therapy: 04PLD7180 to (Evaluate:09Jun2017)  Requested for: 82GZD2987; Last    Rx:14Jun2016 Ordered  18  OneTouch Ultra Blue In [employer]; test twice daily;     Therapy: 90TFQ2353 to ([id number])  Requested for: 44KXB2591; Last Rx:29Mar2017 Ordered  19  OneTouch UltraSoft Lancets Miscellaneous; test twice daily; Therapy: 43EPJ7406 to (Michael Cortez)  Requested for: 04KTB3083; Last    Rx:09Mar2016 Ordered  20  Pravastatin Sodium 80 MG Oral Tablet; take 1 tablet by mouth every day; Therapy: 20MVK6690 to (Evaluate:74Equ2078)  Requested for: 37BNE8479; Last    Rx:22Jan2017 Ordered  21  PredniSONE 5 MG Oral Tablet; TAKE 1 1/2 TABLETS BY MOUTH EVERY DAY; Therapy: 03SPV5716 to (Evaluate:81Srx0504)  Requested for: 26FYJ9627; Last    Rx:06Jun2017 Ordered  22  ROPINIRole HCl - 0 25 MG Oral Tablet; take 1 tablet by mouth twice daily; Therapy: 36ESO8433 to (Evaluate:14Yan9679)  Requested for: 22UAU6646; Last    Rx:30Jun2016 Ordered  23  Sertraline HCl - 100 MG Oral Tablet; TAKE 2 TABLETS DAILY; Therapy: 86ECN5719 to (Evaluate:58Hjb7104)  Requested for: 11JHI7389; Last    Rx:16Nov2016 Ordered  24  Sodium Bicarbonate 650 MG Oral Tablet; Take one tablet daily; Therapy: 72JFP4380 to (Evaluate:12Apr2016)  Requested for: 28OES9907 Recorded  25  Tacrolimus 1 MG Oral Capsule; Take 4 in the morning and 4 in the evening  Requested    for: 67WEZ0272; Last Rx:22May2017 Ordered  26  TraZODone HCl - 150 MG Oral Tablet; TAKE 1 TABLET AT BEDTIME; Therapy: 99NBU3000 to (Evaluate:70Kmr3365)  Requested for: 65SAO9142; Last    Rx:02Mar2017 Ordered  27  VESIcare 5 MG Oral Tablet; take one tablet daily; Therapy: 85Xtv3630 to Recorded  28  Vitamin D3 1000 UNIT Oral Capsule; TAKE 3 PILLS AT NOON BY MOUTH; Therapy: (Recorded:17Jun2016) to Recorded  Medication List Reviewed: The medication list was reviewed and updated today  Allergies   1  Penicillins  2  Morphine Derivatives  3  Duricef TABS  4   Myrbetriq TB24    Vitals  Signs   Recorded: 85AJV0336 03:26PM   Temperature: 98 6 F, Tympanic  Heart Rate: 72, R Radial  Respiration: 20  Systolic: 700, LUE, Sitting  Diastolic: 60, LUE, Sitting  Height: 5 ft 7 5 in  Weight: 213 lb 2 oz  BMI Calculated: 32 89  BSA Calculated: 2 09    Physical Exam    Constitutional   General appearance: No acute distress, well appearing and well nourished  Obese  Eyes   Conjunctiva and lids: No swelling, erythema or discharge  Pupils and irises: Equal, round and reactive to light  Ears, Nose, Mouth, and Throat   External inspection of ears and nose: Normal     Otoscopic examination: Tympanic membranes translucent with normal light reflex  Canals patent without erythema  Oropharynx: Normal with no erythema, edema, exudate or lesions  Pulmonary   Respiratory effort: No increased work of breathing or signs of respiratory distress  Auscultation of lungs: Clear to auscultation  Cardiovascular   Auscultation of heart: Normal rate and rhythm, normal S1 and S2, without murmurs  Examination of extremities for edema and/or varicosities: Normal     Abdomen   Abdomen: Non-tender, no masses  Liver and spleen: No hepatomegaly or splenomegaly  Musculoskeletal Unsteady gait  Ambulates with a cane  Digits and nails: Normal without clubbing or cyanosis  1    Inspection/palpation of joints, bones, and muscles: Abnormal  1   R foot: amputated 1 st toe  L foot: amputated all 5 toes  Skin   Skin and subcutaneous tissue: Normal without rashes or lesions  Neurologic BL hand tremors  Psychiatric   Mood and affect: Normal     Diabetic Foot Screen: Abnormal  1    Socks and shoes removed, the Right Foot: the foot was  not swollen1  ,  not erythematous1  ,  without pre-ulcers1  ,  without ulcers1  and  without a callus1  1   Normal tactile sensation with monofilament testing throughout the right foot  1    Socks and shoes removed, Left Foot: the foot was  not swollen1  ,  not erythematous1  ,  without pre-ulcers1  ,  without ulcers1  and  without a callus1  1   Normal tactile sensation with monofilament testing throughout the left foot  1    Pulses:1    1+ in the dorsalis pedis on the right1      Pulses:1    1+ in the dorsalis pedis on the left1         1 Amended By: Donna Rinaldi; Jun 22 2017 11:07 PM EST    Results/Data  *VB - Foot Exam [id number] 03:48PM Donna Rinaldi     Test Name Result Flag Reference   FOOT Yolette Sheets [id number]         Future Appointments    Date/Time Provider Specialty Site   10/06/2017 09:30 AM EDGAR Womack  [address]   08/21/2017 02:00 PM EDGAR Hoffman  Psychiatry [facility] [place]   07/06/2017 01:30 PM EDGAR Garcia   Nephrology [facility] [address]     Signatures   Electronically signed by : EDGAR Ny ; Jun 22 2017 10:27PM EST                       (Author)    Electronically signed by : EDGAR Ny ; Jun 22 2017 11:11PM EST                       (Author)

## 2018-01-23 NOTE — MISCELLANEOUS
Message  can patient be notified that renal function is improving to baseline  tac level is appropriate  no changes from renal standpoint  thank you    above task request sent        Plan  H/O kidney transplant    · PredniSONE 5 MG Oral Tablet; TAKE 1 TABLET DAILY    Signatures   Electronically signed by : EDGAR Frazier ; Dec 12 2017 12:06PM EST                       (Author)
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
## 2018-01-23 NOTE — RESULT NOTES
Discussion/Summary   TSH is elevated with a low free T4  Please increase dose of levothyroxine from 88 mcg to 100 mcg and recheck TSH and free T4 in 6 weeks  Hemoglobin A1c is decreased to 5 3  Verified Results  (1) HEMOGLOBIN A1C [id number] 07:42AM [facility] Order Number: [id number]     Test Name Result Flag Reference   HEMOGLOBIN A1C 5 3 %  4 2-6 3   EST  AVG  GLUCOSE 105 mg/dl       (1) COMPREHENSIVE METABOLIC PANEL [id number] 77:04TQ [facility] Order Number: [id number]     Test Name Result Flag Reference   SODIUM 138 mmol/L  136-145   POTASSIUM 4 2 mmol/L  3 5-5 3   CHLORIDE 109 mmol/L H 100-108   CARBON DIOXIDE 21 mmol/L  21-32   ANION GAP (CALC) 8 mmol/L  4-13   BLOOD UREA NITROGEN 33 mg/dL H 5-25   CREATININE 1 60 mg/dL H 0 60-1 30   Standardized to IDMS reference method   CALCIUM 9 2 mg/dL  8 3-10 1   BILI, TOTAL 0 27 mg/dL  0 20-1 00   ALK PHOSPHATAS 109 U/L     ALT (SGPT) 20 U/L  12-78   Specimen collection should occur prior to Sulfasalazine and/or Sulfapyridine administration due to the potential for falsely depressed results  AST(SGOT) 17 U/L  5-45   Specimen collection should occur prior to Sulfasalazine administration due to the potential for falsely depressed results  ALBUMIN 2 6 g/dL L 3 5-5 0   TOTAL PROTEIN 8 6 g/dL H 6 4-8 2   eGFR 37 ml/min/1 73sq [facility] Disease Education Program recommendations are as follows:  GFR calculation is accurate only with a steady state creatinine  Chronic Kidney disease less than 60 ml/min/1 73 sq  meters  Kidney failure less than 15 ml/min/1 73 sq  meters  GLUCOSE FASTING 108 mg/dL H 65-99   Specimen collection should occur prior to Sulfasalazine administration due to the potential for falsely depressed results  Specimen collection should occur prior to Sulfapyridine administration due to the potential for falsely elevated results       (1) TSH [id number] 07:42AM [facility] Order Number: [id number]     Test Name Result Flag Reference   TSH 6 410 uIU/mL H 0 358-3 740   Patients undergoing fluorescein dye angiography may retain small amounts of fluorescein in the body for 48-72 hours post procedure  Samples containing fluorescein can produce falsely depressed TSH values  If the patient had this procedure,a specimen should be resubmitted post fluorescein clearance  The recommended reference ranges for TSH during pregnancy are as follows:  First trimester 0 1 to 2 5 uIU/mL  Second trimester  0 2 to 3 0 uIU/mL  Third trimester 0 3 to 3 0 uIU/m     (1) T4, FREE [id number] 07:42AM [facility] Order Number: [id number]     Test Name Result Flag Reference   T4,FREE 0 75 ng/dL L 0 76-1 46   Specimen collection should occur prior to Sulfasalazine administration due to the potential for falsely elevated results

## 2018-01-24 VITALS
HEIGHT: 68 IN | WEIGHT: 218 LBS | HEART RATE: 76 BPM | TEMPERATURE: 97 F | SYSTOLIC BLOOD PRESSURE: 132 MMHG | BODY MASS INDEX: 33.04 KG/M2 | RESPIRATION RATE: 16 BRPM | DIASTOLIC BLOOD PRESSURE: 50 MMHG

## 2018-01-24 VITALS
BODY MASS INDEX: 33.5 KG/M2 | SYSTOLIC BLOOD PRESSURE: 142 MMHG | WEIGHT: 221.01 LBS | HEART RATE: 76 BPM | HEIGHT: 68 IN | DIASTOLIC BLOOD PRESSURE: 60 MMHG

## 2018-01-24 VITALS
RESPIRATION RATE: 16 BRPM | HEIGHT: 68 IN | HEART RATE: 72 BPM | BODY MASS INDEX: 34.1 KG/M2 | TEMPERATURE: 97.8 F | WEIGHT: 225 LBS | DIASTOLIC BLOOD PRESSURE: 72 MMHG | SYSTOLIC BLOOD PRESSURE: 118 MMHG | OXYGEN SATURATION: 93 %

## 2018-01-24 PROBLEM — F33.42 MAJOR DEPRESSIVE DISORDER, RECURRENT EPISODE, IN FULL REMISSION (HCC): Chronic | Status: ACTIVE | Noted: 2017-03-02

## 2018-01-24 RX ORDER — METOPROLOL TARTRATE 50 MG/1
1 TABLET, FILM COATED ORAL 2 TIMES DAILY
COMMUNITY
Start: 2013-10-15 | End: 2018-07-09 | Stop reason: SDUPTHER

## 2018-01-24 RX ORDER — ROPINIROLE 0.25 MG/1
1 TABLET, FILM COATED ORAL 2 TIMES DAILY
COMMUNITY
Start: 2015-07-21 | End: 2018-11-26 | Stop reason: SDUPTHER

## 2018-01-24 RX ORDER — TRAZODONE HYDROCHLORIDE 150 MG/1
1 TABLET ORAL
COMMUNITY
Start: 2013-04-03 | End: 2018-02-02 | Stop reason: ALTCHOICE

## 2018-01-24 RX ORDER — FOLIC ACID 1 MG/1
1 TABLET ORAL DAILY
COMMUNITY
Start: 2013-06-17 | End: 2018-11-26 | Stop reason: SDUPTHER

## 2018-01-24 RX ORDER — SULFAMETHOXAZOLE AND TRIMETHOPRIM 400; 80 MG/1; MG/1
1 TABLET ORAL EVERY OTHER DAY
COMMUNITY
Start: 2017-11-07 | End: 2018-01-25 | Stop reason: ALTCHOICE

## 2018-01-24 RX ORDER — PREDNISONE 1 MG/1
1 TABLET ORAL DAILY
COMMUNITY
Start: 2012-01-04 | End: 2019-05-24 | Stop reason: SDUPTHER

## 2018-01-24 RX ORDER — DOXAZOSIN MESYLATE 1 MG/1
1 TABLET ORAL
COMMUNITY
End: 2018-07-10 | Stop reason: DRUGHIGH

## 2018-01-24 RX ORDER — SODIUM BICARBONATE 650 MG/1
2 TABLET ORAL 2 TIMES DAILY
COMMUNITY
Start: 2018-01-23 | End: 2018-09-20 | Stop reason: SDUPTHER

## 2018-01-24 RX ORDER — BIOTIN 1 MG
1 TABLET ORAL DAILY
COMMUNITY
End: 2021-01-01 | Stop reason: HOSPADM

## 2018-01-24 RX ORDER — TACROLIMUS 1 MG/1
3 CAPSULE ORAL 2 TIMES DAILY
COMMUNITY
End: 2019-05-06 | Stop reason: SDUPTHER

## 2018-01-24 RX ORDER — FUROSEMIDE 20 MG/1
1 TABLET ORAL DAILY
COMMUNITY
Start: 2017-08-08 | End: 2018-05-07 | Stop reason: SDUPTHER

## 2018-01-24 RX ORDER — LANCETS
EACH MISCELLANEOUS 4 TIMES DAILY
COMMUNITY
Start: 2016-03-09 | End: 2020-01-01

## 2018-01-24 RX ORDER — DULOXETIN HYDROCHLORIDE 60 MG/1
1 CAPSULE, DELAYED RELEASE ORAL 2 TIMES DAILY
COMMUNITY
Start: 2011-12-19 | End: 2018-06-20 | Stop reason: SDUPTHER

## 2018-01-24 RX ORDER — LEVOTHYROXINE SODIUM 0.1 MG/1
TABLET ORAL
COMMUNITY
Start: 2017-12-08 | End: 2018-02-16 | Stop reason: SDUPTHER

## 2018-01-24 RX ORDER — PRAVASTATIN SODIUM 80 MG/1
1 TABLET ORAL DAILY
COMMUNITY
Start: 2012-03-22 | End: 2018-01-29 | Stop reason: SDUPTHER

## 2018-01-24 RX ORDER — AMLODIPINE BESYLATE 10 MG/1
10 TABLET ORAL 3 TIMES DAILY
COMMUNITY
Start: 2015-02-03 | End: 2018-05-21 | Stop reason: SDUPTHER

## 2018-01-24 RX ORDER — ARIPIPRAZOLE 20 MG/1
1 TABLET ORAL
COMMUNITY
Start: 2016-02-18 | End: 2018-09-04 | Stop reason: SDUPTHER

## 2018-01-24 RX ORDER — PANTOPRAZOLE SODIUM 40 MG/1
1 TABLET, DELAYED RELEASE ORAL DAILY
COMMUNITY
Start: 2013-08-15 | End: 2018-01-25 | Stop reason: ALTCHOICE

## 2018-01-24 RX ORDER — CLONIDINE HYDROCHLORIDE 0.1 MG/1
0.3 TABLET ORAL EVERY 8 HOURS SCHEDULED
COMMUNITY
Start: 2017-11-07 | End: 2019-01-04 | Stop reason: SDUPTHER

## 2018-01-24 RX ORDER — SERTRALINE HYDROCHLORIDE 100 MG/1
2 TABLET, FILM COATED ORAL DAILY
COMMUNITY
Start: 2011-12-19 | End: 2018-05-06 | Stop reason: SDUPTHER

## 2018-01-24 RX ORDER — LORAZEPAM 2 MG/1
1 TABLET ORAL 3 TIMES DAILY PRN
COMMUNITY
Start: 2015-10-07 | End: 2018-02-04 | Stop reason: HOSPADM

## 2018-01-24 NOTE — RESULT NOTES
Message   hello      labwork reviewed      can patient be notified that Cr stable  bicarb low  UPCR elevated  UA pyuria as prior  tac appropriate  Hb lower than prior      can patient repeat CBC with iron labs      does patient have f/u appt set up?       thank you      jessica      above task request sent        Verified Results  (1) URINALYSIS (will reflex a microscopy if leukocytes, occult blood, protein or nitrites are not within normal limits) 22Jan2018 06:32AM [employer]     Test Name Result Flag Reference   COLOR Yellow     CLARITY Cloudy     SPECIFIC GRAVITY UA 1 012  1 003-1 030   PH UA 7 0  4 5-8 0   LEUKOCYTE ESTERASE UA Large A Negative   NITRITE UA Positive A Negative   PROTEIN UA 30 (1+) mg/dl A Negative   GLUCOSE UA Negative mg/dl  Negative   KETONES UA Negative mg/dl  Negative   UROBILINOGEN UA 0 2 E U /dl  0 2, 1 0 E U /dl   BILIRUBIN UA Negative  Negative   BLOOD UA Trace A Negative     (1) URINALYSIS (will reflex a microscopy if leukocytes, occult blood, protein or nitrites are not within normal limits) 22Jan2018 06:32AM [facility]     Test Name Result Flag Reference   BACTERIA Moderate /hpf A None Seen, Occasional   EPITHELIAL CELLS None Seen /hpf  None Seen, Occasional   HYALINE CASTS 10-25 /lpf A None Seen   RBC UA 2-4 /hpf A None Seen, 0-5   WBC UA Innumerable /hpf A None Seen, 0-5, 5-55, 5-65     (1) CBC/ PLT (NO DIFF) 22Jan2018 06:32AM [facility]     Test Name Result Flag Reference   HEMATOCRIT 29 3 % L 34 8-46 1   HEMOGLOBIN 8 8 g/dL L 11 5-15 4   MCHC 30 0 g/dL L 31 4-37 4   MCH 26 4 pg L 26 8-34 3   MCV 88 fL  82-98   PLATELET COUNT 402 Thousands/uL  149-390   RBC COUNT 3 33 Million/uL L 3 81-5 12   RDW 17 7 % H 11 6-15 1   WBC COUNT 8 95 Thousand/uL  4 31-10 16   MPV 12 2 fL  8 9-12 7     (1) URINE PROTEIN CREATININE RATIO 22Jan2018 06:32AM [facility]     Test Name Result Flag Reference   CREATININE URINE 43 0 mg/dL     URINE PROTEIN:CREATININE RATIO 2 07 H 0  00-0 10   URINE PROTEIN 89 mg/dL       (1) BASIC METABOLIC PROFILE [id number] 06:32AM [employer] [facility]     Test Name Result Flag Reference   SODIUM 139 mmol/L  136-145   POTASSIUM 4 1 mmol/L  3 5-5 3   CHLORIDE 111 mmol/L H 100-108   CARBON DIOXIDE 20 mmol/L L 21-32   ANION GAP (CALC) 8 mmol/L  4-13   BLOOD UREA NITROGEN 41 mg/dL H 5-25   CREATININE 1 73 mg/dL H 0 60-1 30   Standardized to IDMS reference method   CALCIUM 8 9 mg/dL  8 3-10 1   eGFR 33 ml/min/1 73sq m     This is a patient instruction: Patient fasting for 8 hours or longer recommended  National Kidney Disease Education Program recommendations are as follows:  GFR calculation is accurate only with a steady state creatinine  Chronic Kidney disease less than 60 ml/min/1 73 sq  meters  Kidney failure less than 15 ml/min/1 73 sq  meters  GLUCOSE FASTING 91 mg/dL  65-99   Specimen collection should occur prior to Sulfasalazine administration due to the potential for falsely depressed results  Specimen collection should occur prior to Sulfapyridine administration due to the potential for falsely elevated results       (1)  TACROLIMUS [id number] 06:32AM [employer]     Test Name Result Flag Reference    5 2 ng/mL  2 0-20 0   Trough: Immediately following transplant 15 0 ng/mL  Trough: Steady state, 2 weeks or more after transplant 3 0-8 0 ng/mL

## 2018-01-24 NOTE — RESULT NOTES
Discussion/Summary   TSH is slightly elevated with low-normal free T4  Please confirm daily dose of levothyroxine and complaince  Thanks    Verified Results  (1) TSH [id number] 06:32AM [employer] Order Number: [id number]     Test Name Result Flag Reference   TSH 4 970 uIU/mL H 0 358-3 740   Patients undergoing fluorescein dye angiography may retain small amounts of fluorescein in the body for 48-72 hours post procedure  Samples containing fluorescein can produce falsely depressed TSH values  If the patient had this procedure,a specimen should be resubmitted post fluorescein clearance  The recommended reference ranges for TSH during pregnancy are as follows:  First trimester 0 1 to 2 5 uIU/mL  Second trimester  0 2 to 3 0 uIU/mL  Third trimester 0 3 to 3 0 uIU/m     (1) T4, FREE 22Jan2018 06:32AM [facility] Order Number: [id number]     Test Name Result Flag Reference   T4,FREE 0 82 ng/dL  0 76-1 46   Specimen collection should occur prior to Sulfasalazine administration due to the potential for falsely elevated results

## 2018-01-24 NOTE — MISCELLANEOUS
Message  can we schedule patient with my next available  i have not seen patient in a while  also can patient have routine monthly transplant labs - CMP, CBC, Tac, UA, UPCR  last labs are from dec    thank you    above task request sent        Signatures   Electronically signed by : EDGAR Fountain ; Jan 22 2018  5:47PM EST                       (Author)

## 2018-01-25 ENCOUNTER — OFFICE VISIT (OUTPATIENT)
Dept: PSYCHIATRY | Facility: CLINIC | Age: 53
End: 2018-01-25
Payer: MEDICARE

## 2018-01-25 VITALS
WEIGHT: 226 LBS | SYSTOLIC BLOOD PRESSURE: 150 MMHG | HEART RATE: 83 BPM | DIASTOLIC BLOOD PRESSURE: 67 MMHG | BODY MASS INDEX: 34.25 KG/M2 | HEIGHT: 68 IN

## 2018-01-25 DIAGNOSIS — F41.1 GAD (GENERALIZED ANXIETY DISORDER): ICD-10-CM

## 2018-01-25 DIAGNOSIS — F33.1 MAJOR DEPRESSIVE DISORDER, RECURRENT EPISODE, MODERATE (HCC): Primary | Chronic | ICD-10-CM

## 2018-01-25 DIAGNOSIS — F43.12 POST-TRAUMATIC STRESS DISORDER, CHRONIC: ICD-10-CM

## 2018-01-25 DIAGNOSIS — G47.09 OTHER INSOMNIA: ICD-10-CM

## 2018-01-25 PROBLEM — R09.89 BILATERAL CAROTID BRUITS: Status: RESOLVED | Noted: 2017-06-02 | Resolved: 2018-01-25

## 2018-01-25 PROCEDURE — 99214 OFFICE O/P EST MOD 30 MIN: CPT | Performed by: PSYCHIATRY & NEUROLOGY

## 2018-01-25 RX ORDER — BUSPIRONE HYDROCHLORIDE 5 MG/1
5 TABLET ORAL 2 TIMES DAILY
Qty: 180 TABLET | Refills: 1 | Status: SHIPPED | OUTPATIENT
Start: 2018-01-25 | End: 2018-07-17 | Stop reason: SDUPTHER
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
## 2018-01-26 ENCOUNTER — OFFICE VISIT (OUTPATIENT)
Dept: NEPHROLOGY | Facility: CLINIC | Age: 53
End: 2018-01-26
Payer: MEDICARE

## 2018-01-26 VITALS
BODY MASS INDEX: 34.77 KG/M2 | WEIGHT: 229.4 LBS | SYSTOLIC BLOOD PRESSURE: 170 MMHG | HEART RATE: 64 BPM | HEIGHT: 68 IN | DIASTOLIC BLOOD PRESSURE: 68 MMHG

## 2018-01-26 DIAGNOSIS — D64.9 ANEMIA, UNSPECIFIED TYPE: ICD-10-CM

## 2018-01-26 DIAGNOSIS — Z94.0 RENAL TRANSPLANT, STATUS POST: ICD-10-CM

## 2018-01-26 DIAGNOSIS — N17.9 ACUTE RENAL FAILURE, UNSPECIFIED ACUTE RENAL FAILURE TYPE (HCC): ICD-10-CM

## 2018-01-26 DIAGNOSIS — N18.31 CHRONIC KIDNEY DISEASE (CKD) STAGE G3A/A2, MODERATELY DECREASED GLOMERULAR FILTRATION RATE (GFR) BETWEEN 45-59 ML/MIN/1.73 SQUARE METER AND ALBUMINURIA CREATININE RATIO BETWEEN 30-299 MG/G (HCC): Primary | ICD-10-CM

## 2018-01-26 DIAGNOSIS — I10 ESSENTIAL HYPERTENSION: ICD-10-CM

## 2018-01-26 PROCEDURE — 99214 OFFICE O/P EST MOD 30 MIN: CPT | Performed by: NURSE PRACTITIONER

## 2018-01-26 NOTE — PATIENT INSTRUCTIONS
All questions asked and answered  The patient has been instructed to call office with any questions or concerns  The patient is in agreement  1  Will increase clonidine to 0 03 mg every 8 hours 2  Get repeat CBC with iron studies to evaluate decreased hemoglobin  3  Continue to take bicarb 1 tablet 4 times a day  5   Will get repeat labs on 3 months with retrurn visit with Dr Vamshi Huerta  6   Continue to check monthly Prograf level as per Dr Vamshi Huerta      Low-Sodium Diet   WHAT [place]:   What is a low-sodium diet? A low-sodium diet limits foods that are high in sodium (salt)  You will need to follow a low-sodium diet if you have high blood pressure, kidney disease, or heart failure  You may also need to follow this diet if you have a condition that is causing your body to retain (hold) extra fluid  You may need to limit the amount of sodium you eat to 1,500 mg  Ask your healthcare provider how much sodium you can have each day  How can I use food labels to choose foods that are low in sodium? Read food labels to find the amount of sodium they contain  The amount of sodium is listed in milligrams (mg)  The % Daily Value (DV) column tells you how much of your daily needs are met by 1 serving of the food for each nutrient listed  Choose foods that have less than 5% of the DV of sodium  These foods are considered low in sodium  Foods that have 20% or more of the DV of sodium are considered high in sodium  Some food labels may also list any of the following terms that tell you about the sodium content in the food:  · Sodium-free:  Less than 5 mg in each serving    · Very low sodium:  35 mg of sodium or less in each serving    · Low sodium:  140 mg of sodium or less in each serving    · Reduced sodium:   At least 25% less sodium in each serving than the regular type    · Light in sodium:  50% less sodium in each serving    · Unsalted or no added salt:  No extra salt is added during processing (the food may still contain sodium)  Which foods should I avoid? Salty foods are high in sodium  You should avoid the following:  · Processed foods:      ¨ Mixes for cornbread, biscuits, cake, and pudding     ¨ Instant foods, such as potatoes, cereals, noodles, and rice     ¨ Packaged foods, such as bread stuffing, rice and pasta mixes, snack dip mixes, and macaroni and cheese     ¨ Canned foods, such as canned vegetables, soups, broths, sauces, and vegetable or tomato juice    ¨ Snack foods, such as salted chips, popcorn, pretzels, pork rinds, salted crackers, and salted nuts    ¨ Frozen foods, such as dinners, entrees, vegetables with sauces, and breaded meats    ¨ Sauerkraut, pickled vegetables, and other foods prepared in brine    · Meats and cheeses:      ¨ Smoked or cured meat, such as corned beef, arriaza, ham, hot dogs, and sausage    ¨ Canned meats or spreads, such as potted meats, sardines, anchovies, and imitation seafood    ¨ Deli or lunch meats, such as bologna, ham, turkey, and roast beef    ¨ Processed cheese, such as American cheese and cheese spreads    · Condiments, sauces, and seasonings:      ¨ Salt (¼ teaspoon of salt contains 575 mg of sodium)    ¨ Seasonings made with salt, such as garlic salt, celery salt, onion salt, and seasoned salt    ¨ Regular soy sauce, barbecue sauce, teriyaki sauce, steak sauce, Worcestershire sauce, and most flavored vinegars    ¨ Canned gravy and mixes     ¨ Regular condiments, such as mustard, ketchup, and salad dressings    ¨ Pickles and olives    ¨ Meat tenderizers and monosodium glutamate (MSG)  Which foods can I include? Read the food label to find the amount of sodium in each serving  · Bread and cereal:  Try to choose breads with less than 80 mg of sodium per serving  ¨ Bread, roll, ewa, tortilla, or unsalted crackers      ¨ Ready-to-eat cereals with less than 5% DV of sodium (examples include shredded wheat and puffed rice)    ¨ Pasta    · Vegetables and fruits: ¨ Unsalted fresh, frozen, or canned vegetables    ¨ Fresh, frozen, or canned fruits    ¨ Fruit juice    · Dairy:  One serving has about 150 mg of sodium  ¨ Milk, all types    ¨ Yogurt    ¨ Hard cheese, such as cheddar, Swiss, [employer], or mozzarella    · Meat and other protein foods:  Some raw meats may have added sodium  ¨ Plain meats, fish, and poultry     ¨ Egg    · Other foods:      ¨ Homemade pudding    ¨ Unsalted nuts, popcorn, or pretzels    ¨ Unsalted butter or margarine  What are some ways that I can decrease sodium? · Add spices and herbs to foods instead of salt during cooking  Use salt-free seasonings to add flavor to foods  Examples include onion powder, garlic powder, basil, ulloa powder, paprika, and parsley  Try lemon or lime juice or vinegar to give foods a tart flavor  Use hot peppers, pepper, or cayenne pepper to add a spicy flavor to foods  · Do not keep a salt shaker at your kitchen table  This may help keep you from adding salt to food at the table  It may take time to get used to enjoying the natural flavor of food instead of adding salt  Talk to your healthcare provider before you use salt substitutes  Some salt substitutes have a high amount of potassium and need to be avoided if you have kidney disease  · Choose low-sodium foods at restaurants  Meals from restaurants are often high in sodium  Some restaurants have nutrition information on the menu that tells you the amount of sodium in their foods  If possible, ask for your food to be prepared with less, or no salt  · Shop for unsalted or low-sodium foods and snacks at the grocery store  Examples include unsalted or low-sodium broths, soups, and canned vegetables  Choose fresh or frozen vegetables instead  Choose unsalted nuts or seeds or fresh fruits or vegetables as snacks  Read food labels and choose salt-free, very low-sodium, or low-sodium foods    CARE AGREEMENT:   You have the right to help plan your care  Discuss treatment options with your caregivers to decide what care you want to receive  You always have the right to refuse treatment  The above information is an  only  It is not intended as medical advice for individual conditions or treatments  Talk to your doctor, nurse or pharmacist before following any medical regimen to see if it is safe and effective for you  © 2017 [address] [employer] Information is for End User's use only and may not be sold, redistributed or otherwise used for commercial purposes  All illustrations and images included in CareNotes® are the copyrighted property of [employer]  or Tamir Leblanc

## 2018-01-26 NOTE — PROGRESS NOTES
OFFICE FOLLOW UP - Nephrology   Alina Conroy 47 y o  female MRN: [id number]    Encounter: [id number]    ASSESSMENT and PLAN:  Kasie Reyes was seen today for follow-up  Diagnoses and all orders for this visit:    Chronic kidney disease (CKD) stage G3a/A2, moderately decreased glomerular filtration rate (GFR) between 45-59 mL/min/1 73 square meter and albuminuria creatinine ratio between  mg/g  -Secondary HTN and DM  -Renal function resolved, stable and back at baseline 1 73  -Will repeat lab work in 3 months with Dr John Sanchez  -Avoid nephrotoxins-NSAIDs    Renal transplant, status post: Continues on I/S medications with Prograf, and Prednisone  -Continue with Monthly Prograf level monthly    Acute renal failure, unspecified [facility] Secondary to volume depletion with GI loss  -This is now resolved    Essential hypertension: BP elevated on BP log and in office  Will increase clonidine to 0 3 mg every 8 hours  -May need to consider increasing Doxazosin to 2 mg at bedtime   -instructed to repeat BP check for 10 days and send in for review    Anemia, unspecified type: HGB  Decreased 8 8  Will repeat to ensure stability and check iron stores  Metabolic Acidosis: Bicarb 20 on 650 mg 4 times/day  -Trend for now    Volume Status: Examines euvolemic  HPI: Alina Conroy is a 47 y o  female who is here for Follow-up (hospital)  from 11/16/2017 to 11/20/2017 for epigastric pain with nausea and vomiting  She developed EDUARDO with creatinine peaked at 3 1  This resolved prior to discharge  Patient currently has no complaints at this time and is feeling well  Patient denies any chest pain, shortness of breath, nausea, vomiting, GI symptoms, urinary issues or leg swelling  The last blood work was done on 1/22/2018 has been reviewed together  BP log reviewed and reveals -170's at various time  No H/A or NSAID uses         ROS:   All the systems were reviewed and were negative except as documented on the HPI      Allergies: Cefadroxil; Morphine and related; Penicillins; and Myrbetriq [mirabegron]    Medications:   Current Outpatient Prescriptions:     amLODIPine (NORVASC) 10 mg tablet, Take 10 mg by mouth 10mg in the morning & 5mg in the evening , Disp: , Rfl:     ARIPiprazole (ABILIFY) 30 mg tablet, Take 1 tablet by mouth, Disp: , Rfl:     aspirin 81 MG tablet, Take 1 tablet by mouth daily, Disp: , Rfl:     busPIRone (BUSPAR) 5 mg tablet, Take 1 tablet by mouth 2 (two) times a day for 180 days, Disp: 180 tablet, Rfl: 1    Cholecalciferol (VITAMIN D3) 1000 units CAPS, Take by mouth, Disp: , Rfl:     cloNIDine (CATAPRES) 0 1 mg tablet, Take by mouth, Disp: , Rfl:     doxazosin (CARDURA) 1 mg tablet, Take 1 tablet by mouth, Disp: , Rfl:     DULoxetine (CYMBALTA) 60 mg delayed release capsule, Take 1 capsule by mouth 2 (two) times a day, Disp: , Rfl:     folic acid (FOLVITE) 1 mg tablet, Take 1 tablet by mouth daily, Disp: , Rfl:     furosemide (LASIX) 20 mg tablet, Take 1 tablet by mouth daily, Disp: , Rfl:     glucose blood (ONE TOUCH ULTRA TEST) test strip, by In Vitro route, Disp: , Rfl:     Insulin Glargine (TOUJEO SOLOSTAR) injection pen 300 units/mL, Inject 20 Units under the skin daily at bedtime, Disp: , Rfl:     insulin lispro (HUMALOG KWIKPEN) 100 Units/mL SOPN, Inject under the skin, Disp: , Rfl:     Insulin Pen Needle (BD PEN NEEDLE VISHNU U/F) 32G X 4 MM MISC, by Does not apply route, Disp: , Rfl:     Lancets (ONETOUCH ULTRASOFT) lancets, by Does not apply route 2 (two) times a day, Disp: , Rfl:     levothyroxine 100 mcg tablet, Take by mouth, Disp: , Rfl:     LORazepam (ATIVAN) 2 mg tablet, Take 1 tablet by mouth 3 (three) times a day as needed, Disp: , Rfl:     metoprolol tartrate (LOPRESSOR) 50 mg tablet, Take 1 tablet by mouth 2 (two) times a day, Disp: , Rfl:     pravastatin (PRAVACHOL) 80 mg tablet, Take 1 tablet by mouth daily, Disp: , Rfl:    predniSONE 5 mg tablet, Take 1 tablet by mouth daily, Disp: , Rfl:     rOPINIRole (REQUIP) 0 25 mg tablet, Take 1 tablet by mouth 2 (two) times a day, Disp: , Rfl:     sertraline (ZOLOFT) 100 mg tablet, Take 2 tablets by mouth daily, Disp: , Rfl:     sodium bicarbonate 650 mg tablet, Take 1 tablet by mouth 4 (four) times a day  , Disp: , Rfl:     tacrolimus (PROGRAF) 1 mg capsule, Take by mouth, Disp: , Rfl:     traZODone (DESYREL) 150 mg tablet, Take 1 tablet by mouth daily at bedtime, Disp: , Rfl:     Past Medical History:   Diagnosis Date    Anemia     Cancer ([place] [address] )     Multiple myeloma    Chronic diastolic (congestive) heart failure 9/18/2017    Diabetes mellitus ([place] [address] )     Previous, controlled with diet    Disease of thyroid gland     History of transfusion     Hypertension     Night blindness     Psychiatric disorder     Renal disorder     Retinopathy     Status post simultaneous kidney and pancreas transplant ([place] [address] )     Toe amputation status ([place] [address] )      Past Surgical History:   Procedure Laterality Date    CHOLECYSTECTOMY      COMBINED KIDNEY-PANCREAS TRANSPLANT N/A     CYSTOSCOPY N/A 10/13/2016    Procedure: CYSTOSCOPY, retrograde pyelogram, biopsy of ureteral polyp; Surgeon: Tika Nichole MD;  [facility]: [facility];  Service:     ESOPHAGOGASTRODUODENOSCOPY N/A 11/20/2017    Procedure: ESOPHAGOGASTRODUODENOSCOPY (EGD); Surgeon: Elder Phan DO;  Location: [facility]; Service: Gastroenterology    EYE SURGERY      cataracts    FOOT AMPUTATION THROUGH METATARSAL Left     HALLUX VALGUS CORRECTION Right     NEPHRECTOMY TRANSPLANTED ORGAN       Family History   Problem Relation Age of Onset    Hypertension Mother     Hypertension Father     Cancer Maternal Grandfather     Cancer Paternal Grandmother     Cancer Paternal Grandfather     Depression Sister       reports that she quit smoking about 5 years ago   She has quit using smokeless tobacco  She reports that she uses drugs, including Hydrocodone and Cocaine  She reports that she does not drink alcohol  Physical Exam:   Vitals:    01/26/18 1544   BP: 170/68   BP Location: Left arm   Patient Position: Sitting   Cuff Size: Large   Pulse: 64   Weight: 104 kg (229 lb 6 4 oz)   Height: 5' 7 6" (1 717 m)       General: cooperative, in not acute distress  Eyes: conjunctivae pink, anicteric sclerae  ENT: lips and mucous membranes moist  Neck: supple, no JVD  Chest: clear breath sounds bilateral, no crackles, ronchus or wheezings, decreased bases  CVS:  normal rate, regular rhythm  Abdomen: soft, non-tender, non-distended, normoactive bowel sounds  Extremities: Trace edema of both legs pretibial  Skin: no rash  Neuro: awake, alert, oriented      Lab Results:    Results from last 7 days  Lab Units 01/22/18  0632   WBC Thousand/uL 8 95   HEMOGLOBIN g/dL 8 8*   HEMATOCRIT % 29 3*   PLATELETS Thousands/uL 275   SODIUM mmol/L 139   POTASSIUM mmol/L 4 1   CHLORIDE mmol/L 111*   CO2 mmol/L 20*   BUN mg/dL 41*   CREATININE mg/dL 1 73*   CALCIUM mg/dL 8 9         Portions of the record may have been created with voice recognition software  Occasional wrong word or "sound a like" substitutions may have occurred due to the inherent limitations of voice recognition software  Read the chart carefully and recognize, using context, where substitutions have occurred  If you have any questions, please contact the dictating provider

## 2018-01-29 DIAGNOSIS — E78.2 MIXED HYPERLIPIDEMIA: ICD-10-CM

## 2018-01-29 DIAGNOSIS — E78.2 MIXED HYPERLIPIDEMIA: Primary | ICD-10-CM

## 2018-01-29 RX ORDER — PRAVASTATIN SODIUM 80 MG/1
80 TABLET ORAL DAILY
Qty: 30 TABLET | Refills: 5 | Status: SHIPPED | OUTPATIENT
Start: 2018-01-29 | End: 2018-01-29 | Stop reason: SDUPTHER

## 2018-01-29 RX ORDER — PRAVASTATIN SODIUM 80 MG/1
TABLET ORAL
Qty: 90 TABLET | Refills: 5 | Status: SHIPPED | OUTPATIENT
Start: 2018-01-29 | End: 2020-01-01

## 2018-02-02 ENCOUNTER — HOSPITAL ENCOUNTER (OUTPATIENT)
Facility: HOSPITAL | Age: 53
Setting detail: OBSERVATION
Discharge: HOME | End: 2018-02-04
Attending: EMERGENCY MEDICINE | Admitting: HOSPITALIST
Payer: MEDICARE

## 2018-02-02 ENCOUNTER — APPOINTMENT (EMERGENCY)
Dept: RADIOLOGY | Facility: HOSPITAL | Age: 53
End: 2018-02-02
Payer: MEDICARE

## 2018-02-02 ENCOUNTER — TELEPHONE (OUTPATIENT)
Dept: ENDOCRINOLOGY | Facility: CLINIC | Age: 53
End: 2018-02-02

## 2018-02-02 DIAGNOSIS — N30.00 ACUTE CYSTITIS WITHOUT HEMATURIA: ICD-10-CM

## 2018-02-02 DIAGNOSIS — K52.9 GASTROENTERITIS, ACUTE: ICD-10-CM

## 2018-02-02 DIAGNOSIS — F41.9 ANXIETY: ICD-10-CM

## 2018-02-02 DIAGNOSIS — R19.7 DIARRHEA: ICD-10-CM

## 2018-02-02 DIAGNOSIS — R11.2 INTRACTABLE NAUSEA AND VOMITING: ICD-10-CM

## 2018-02-02 DIAGNOSIS — E87.6 HYPOKALEMIA: Primary | ICD-10-CM

## 2018-02-02 PROBLEM — E11.29 TYPE 2 DIABETES MELLITUS WITH RENAL COMPLICATION (HCC): Status: ACTIVE | Noted: 2018-02-02

## 2018-02-02 LAB
ALBUMIN SERPL BCP-MCNC: 3 G/DL (ref 3.5–5)
ALP SERPL-CCNC: 125 U/L (ref 46–116)
ALT SERPL W P-5'-P-CCNC: 22 U/L (ref 12–78)
ANION GAP SERPL CALCULATED.3IONS-SCNC: 8 MMOL/L (ref 4–13)
APTT PPP: 26 SECONDS (ref 23–35)
AST SERPL W P-5'-P-CCNC: 14 U/L (ref 5–45)
ATRIAL RATE: 98 BPM
BACTERIA UR QL AUTO: ABNORMAL /HPF
BASOPHILS # BLD AUTO: 0.03 THOUSANDS/ΜL (ref 0–0.1)
BASOPHILS NFR BLD AUTO: 0 % (ref 0–1)
BILIRUB SERPL-MCNC: 0.35 MG/DL (ref 0.2–1)
BILIRUB UR QL STRIP: NEGATIVE
BUN SERPL-MCNC: 21 MG/DL (ref 5–25)
CALCIUM SERPL-MCNC: 9.5 MG/DL (ref 8.3–10.1)
CHLORIDE SERPL-SCNC: 111 MMOL/L (ref 100–108)
CLARITY UR: ABNORMAL
CO2 SERPL-SCNC: 23 MMOL/L (ref 21–32)
COLOR UR: YELLOW
CREAT SERPL-MCNC: 1.52 MG/DL (ref 0.6–1.3)
EOSINOPHIL # BLD AUTO: 0.04 THOUSAND/ΜL (ref 0–0.61)
EOSINOPHIL NFR BLD AUTO: 0 % (ref 0–6)
ERYTHROCYTE [DISTWIDTH] IN BLOOD BY AUTOMATED COUNT: 18.1 % (ref 11.6–15.1)
GFR SERPL CREATININE-BSD FRML MDRD: 39 ML/MIN/1.73SQ M
GLUCOSE SERPL-MCNC: 103 MG/DL (ref 65–140)
GLUCOSE SERPL-MCNC: 117 MG/DL (ref 65–140)
GLUCOSE SERPL-MCNC: 134 MG/DL (ref 65–140)
GLUCOSE SERPL-MCNC: 135 MG/DL (ref 65–140)
GLUCOSE SERPL-MCNC: 63 MG/DL (ref 65–140)
GLUCOSE SERPL-MCNC: 74 MG/DL (ref 65–140)
GLUCOSE UR STRIP-MCNC: NEGATIVE MG/DL
HCT VFR BLD AUTO: 32.5 % (ref 34.8–46.1)
HGB BLD-MCNC: 10.2 G/DL (ref 11.5–15.4)
HGB UR QL STRIP.AUTO: ABNORMAL
INR PPP: 1.19 (ref 0.86–1.16)
KETONES UR STRIP-MCNC: NEGATIVE MG/DL
LEUKOCYTE ESTERASE UR QL STRIP: ABNORMAL
LIPASE SERPL-CCNC: 101 U/L (ref 73–393)
LYMPHOCYTES # BLD AUTO: 1.37 THOUSANDS/ΜL (ref 0.6–4.47)
LYMPHOCYTES NFR BLD AUTO: 10 % (ref 14–44)
MCH RBC QN AUTO: 26.6 PG (ref 26.8–34.3)
MCHC RBC AUTO-ENTMCNC: 31.4 G/DL (ref 31.4–37.4)
MCV RBC AUTO: 85 FL (ref 82–98)
MONOCYTES # BLD AUTO: 0.76 THOUSAND/ΜL (ref 0.17–1.22)
MONOCYTES NFR BLD AUTO: 6 % (ref 4–12)
NEUTROPHILS # BLD AUTO: 10.9 THOUSANDS/ΜL (ref 1.85–7.62)
NEUTS SEG NFR BLD AUTO: 84 % (ref 43–75)
NITRITE UR QL STRIP: POSITIVE
NON-SQ EPI CELLS URNS QL MICRO: ABNORMAL /HPF
NRBC BLD AUTO-RTO: 0 /100 WBCS
OTHER STN SPEC: ABNORMAL
P AXIS: 65 DEGREES
PH UR STRIP.AUTO: 6 [PH] (ref 4.5–8)
PLATELET # BLD AUTO: 272 THOUSANDS/UL (ref 149–390)
PMV BLD AUTO: 11.5 FL (ref 8.9–12.7)
POTASSIUM SERPL-SCNC: 3 MMOL/L (ref 3.5–5.3)
PR INTERVAL: 158 MS
PROT SERPL-MCNC: 9.8 G/DL (ref 6.4–8.2)
PROT UR STRIP-MCNC: >=300 MG/DL
PROTHROMBIN TIME: 15.2 SECONDS (ref 12.1–14.4)
QRS AXIS: -50 DEGREES
QRSD INTERVAL: 78 MS
QT INTERVAL: 406 MS
QTC INTERVAL: 518 MS
RBC # BLD AUTO: 3.83 MILLION/UL (ref 3.81–5.12)
RBC #/AREA URNS AUTO: ABNORMAL /HPF
SODIUM SERPL-SCNC: 142 MMOL/L (ref 136–145)
SP GR UR STRIP.AUTO: 1.02 (ref 1–1.03)
T WAVE AXIS: 60 DEGREES
TROPONIN I SERPL-MCNC: <0.02 NG/ML
UROBILINOGEN UR QL STRIP.AUTO: 0.2 E.U./DL
VENTRICULAR RATE: 98 BPM
WBC # BLD AUTO: 13.24 THOUSAND/UL (ref 4.31–10.16)
WBC #/AREA URNS AUTO: ABNORMAL /HPF

## 2018-02-02 PROCEDURE — 81001 URINALYSIS AUTO W/SCOPE: CPT

## 2018-02-02 PROCEDURE — 93010 ELECTROCARDIOGRAM REPORT: CPT | Performed by: INTERNAL MEDICINE

## 2018-02-02 PROCEDURE — 81002 URINALYSIS NONAUTO W/O SCOPE: CPT | Performed by: EMERGENCY MEDICINE

## 2018-02-02 PROCEDURE — 82948 REAGENT STRIP/BLOOD GLUCOSE: CPT

## 2018-02-02 PROCEDURE — 87077 CULTURE AEROBIC IDENTIFY: CPT

## 2018-02-02 PROCEDURE — 85025 COMPLETE CBC W/AUTO DIFF WBC: CPT | Performed by: EMERGENCY MEDICINE

## 2018-02-02 PROCEDURE — 36415 COLL VENOUS BLD VENIPUNCTURE: CPT | Performed by: EMERGENCY MEDICINE

## 2018-02-02 PROCEDURE — 96374 THER/PROPH/DIAG INJ IV PUSH: CPT

## 2018-02-02 PROCEDURE — 87186 SC STD MICRODIL/AGAR DIL: CPT

## 2018-02-02 PROCEDURE — 99285 EMERGENCY DEPT VISIT HI MDM: CPT

## 2018-02-02 PROCEDURE — 93005 ELECTROCARDIOGRAM TRACING: CPT | Performed by: EMERGENCY MEDICINE

## 2018-02-02 PROCEDURE — 85730 THROMBOPLASTIN TIME PARTIAL: CPT | Performed by: EMERGENCY MEDICINE

## 2018-02-02 PROCEDURE — 85610 PROTHROMBIN TIME: CPT | Performed by: EMERGENCY MEDICINE

## 2018-02-02 PROCEDURE — 71046 X-RAY EXAM CHEST 2 VIEWS: CPT

## 2018-02-02 PROCEDURE — 99220 PR INITIAL OBSERVATION CARE/DAY 70 MINUTES: CPT | Performed by: INTERNAL MEDICINE

## 2018-02-02 PROCEDURE — 96361 HYDRATE IV INFUSION ADD-ON: CPT

## 2018-02-02 PROCEDURE — 80053 COMPREHEN METABOLIC PANEL: CPT | Performed by: EMERGENCY MEDICINE

## 2018-02-02 PROCEDURE — 96376 TX/PRO/DX INJ SAME DRUG ADON: CPT

## 2018-02-02 PROCEDURE — 83690 ASSAY OF LIPASE: CPT | Performed by: EMERGENCY MEDICINE

## 2018-02-02 PROCEDURE — 87086 URINE CULTURE/COLONY COUNT: CPT

## 2018-02-02 PROCEDURE — 84484 ASSAY OF TROPONIN QUANT: CPT | Performed by: EMERGENCY MEDICINE

## 2018-02-02 PROCEDURE — 96375 TX/PRO/DX INJ NEW DRUG ADDON: CPT

## 2018-02-02 PROCEDURE — 74176 CT ABD & PELVIS W/O CONTRAST: CPT

## 2018-02-02 RX ORDER — ACETAMINOPHEN 325 MG/1
650 TABLET ORAL EVERY 6 HOURS PRN
Status: DISCONTINUED | OUTPATIENT
Start: 2018-02-02 | End: 2018-02-04 | Stop reason: HOSPADM

## 2018-02-02 RX ORDER — BUSPIRONE HYDROCHLORIDE 5 MG/1
5 TABLET ORAL 2 TIMES DAILY
Status: DISCONTINUED | OUTPATIENT
Start: 2018-02-02 | End: 2018-02-04 | Stop reason: HOSPADM

## 2018-02-02 RX ORDER — SIMETHICONE 80 MG
80 TABLET,CHEWABLE ORAL 4 TIMES DAILY PRN
Status: DISCONTINUED | OUTPATIENT
Start: 2018-02-02 | End: 2018-02-04 | Stop reason: HOSPADM

## 2018-02-02 RX ORDER — POTASSIUM CHLORIDE 20 MEQ/1
40 TABLET, EXTENDED RELEASE ORAL ONCE
Status: COMPLETED | OUTPATIENT
Start: 2018-02-02 | End: 2018-02-02

## 2018-02-02 RX ORDER — DEXTROSE MONOHYDRATE 25 G/50ML
25 INJECTION, SOLUTION INTRAVENOUS ONCE
Status: COMPLETED | OUTPATIENT
Start: 2018-02-02 | End: 2018-02-02

## 2018-02-02 RX ORDER — SODIUM CHLORIDE AND POTASSIUM CHLORIDE .9; .15 G/100ML; G/100ML
100 SOLUTION INTRAVENOUS CONTINUOUS
Status: DISCONTINUED | OUTPATIENT
Start: 2018-02-02 | End: 2018-02-03

## 2018-02-02 RX ORDER — SODIUM BICARBONATE 650 MG/1
1300 TABLET ORAL 2 TIMES DAILY
Status: DISCONTINUED | OUTPATIENT
Start: 2018-02-02 | End: 2018-02-04 | Stop reason: HOSPADM

## 2018-02-02 RX ORDER — LEVOTHYROXINE SODIUM 0.1 MG/1
100 TABLET ORAL
Status: DISCONTINUED | OUTPATIENT
Start: 2018-02-02 | End: 2018-02-04 | Stop reason: HOSPADM

## 2018-02-02 RX ORDER — HYDRALAZINE HYDROCHLORIDE 20 MG/ML
5 INJECTION INTRAMUSCULAR; INTRAVENOUS EVERY 6 HOURS PRN
Status: DISCONTINUED | OUTPATIENT
Start: 2018-02-02 | End: 2018-02-04 | Stop reason: HOSPADM

## 2018-02-02 RX ORDER — METOCLOPRAMIDE HYDROCHLORIDE 5 MG/ML
10 INJECTION INTRAMUSCULAR; INTRAVENOUS EVERY 6 HOURS PRN
Status: DISCONTINUED | OUTPATIENT
Start: 2018-02-02 | End: 2018-02-04 | Stop reason: HOSPADM

## 2018-02-02 RX ORDER — LORAZEPAM 1 MG/1
2 TABLET ORAL 3 TIMES DAILY PRN
Status: DISCONTINUED | OUTPATIENT
Start: 2018-02-02 | End: 2018-02-04 | Stop reason: HOSPADM

## 2018-02-02 RX ORDER — ONDANSETRON 2 MG/ML
4 INJECTION INTRAMUSCULAR; INTRAVENOUS ONCE
Status: COMPLETED | OUTPATIENT
Start: 2018-02-02 | End: 2018-02-02

## 2018-02-02 RX ORDER — ONDANSETRON 2 MG/ML
4 INJECTION INTRAMUSCULAR; INTRAVENOUS EVERY 6 HOURS PRN
Status: DISCONTINUED | OUTPATIENT
Start: 2018-02-02 | End: 2018-02-04 | Stop reason: HOSPADM

## 2018-02-02 RX ORDER — FOLIC ACID 1 MG/1
1000 TABLET ORAL DAILY
Status: DISCONTINUED | OUTPATIENT
Start: 2018-02-02 | End: 2018-02-04 | Stop reason: HOSPADM

## 2018-02-02 RX ORDER — FUROSEMIDE 20 MG/1
20 TABLET ORAL DAILY
Status: DISCONTINUED | OUTPATIENT
Start: 2018-02-02 | End: 2018-02-04 | Stop reason: HOSPADM

## 2018-02-02 RX ORDER — CLONIDINE HYDROCHLORIDE 0.1 MG/1
0.3 TABLET ORAL EVERY 8 HOURS SCHEDULED
Status: DISCONTINUED | OUTPATIENT
Start: 2018-02-02 | End: 2018-02-04 | Stop reason: HOSPADM

## 2018-02-02 RX ORDER — CIPROFLOXACIN 500 MG/1
500 TABLET, FILM COATED ORAL EVERY 12 HOURS SCHEDULED
Status: DISCONTINUED | OUTPATIENT
Start: 2018-02-02 | End: 2018-02-04

## 2018-02-02 RX ORDER — PREDNISONE 1 MG/1
5 TABLET ORAL DAILY
Status: DISCONTINUED | OUTPATIENT
Start: 2018-02-02 | End: 2018-02-04 | Stop reason: HOSPADM

## 2018-02-02 RX ORDER — ASPIRIN 81 MG/1
81 TABLET, CHEWABLE ORAL DAILY
Status: DISCONTINUED | OUTPATIENT
Start: 2018-02-02 | End: 2018-02-04 | Stop reason: HOSPADM

## 2018-02-02 RX ORDER — TACROLIMUS 1 MG/1
3 CAPSULE ORAL
Status: DISCONTINUED | OUTPATIENT
Start: 2018-02-02 | End: 2018-02-04 | Stop reason: HOSPADM

## 2018-02-02 RX ORDER — AMLODIPINE BESYLATE 5 MG/1
5 TABLET ORAL EVERY EVENING
COMMUNITY
End: 2018-05-01 | Stop reason: ALTCHOICE

## 2018-02-02 RX ORDER — ROPINIROLE 0.25 MG/1
0.25 TABLET, FILM COATED ORAL 2 TIMES DAILY
Status: DISCONTINUED | OUTPATIENT
Start: 2018-02-02 | End: 2018-02-04 | Stop reason: HOSPADM

## 2018-02-02 RX ORDER — AMLODIPINE BESYLATE 10 MG/1
10 TABLET ORAL DAILY
Status: DISCONTINUED | OUTPATIENT
Start: 2018-02-02 | End: 2018-02-04 | Stop reason: HOSPADM

## 2018-02-02 RX ORDER — TACROLIMUS 1 MG/1
3 CAPSULE ORAL
COMMUNITY
End: 2018-05-01 | Stop reason: SDUPTHER

## 2018-02-02 RX ORDER — TACROLIMUS 1 MG/1
4 CAPSULE ORAL DAILY
Status: DISCONTINUED | OUTPATIENT
Start: 2018-02-02 | End: 2018-02-04 | Stop reason: HOSPADM

## 2018-02-02 RX ORDER — HEPARIN SODIUM 5000 [USP'U]/ML
5000 INJECTION, SOLUTION INTRAVENOUS; SUBCUTANEOUS EVERY 8 HOURS SCHEDULED
Status: DISCONTINUED | OUTPATIENT
Start: 2018-02-02 | End: 2018-02-04 | Stop reason: HOSPADM

## 2018-02-02 RX ORDER — METOPROLOL TARTRATE 50 MG/1
50 TABLET, FILM COATED ORAL 2 TIMES DAILY
Status: DISCONTINUED | OUTPATIENT
Start: 2018-02-02 | End: 2018-02-04 | Stop reason: HOSPADM

## 2018-02-02 RX ORDER — DULOXETIN HYDROCHLORIDE 60 MG/1
60 CAPSULE, DELAYED RELEASE ORAL 2 TIMES DAILY
Status: DISCONTINUED | OUTPATIENT
Start: 2018-02-02 | End: 2018-02-04 | Stop reason: HOSPADM

## 2018-02-02 RX ORDER — ACETAMINOPHEN 325 MG/1
975 TABLET ORAL ONCE
Status: DISCONTINUED | OUTPATIENT
Start: 2018-02-02 | End: 2018-02-02

## 2018-02-02 RX ORDER — PRAVASTATIN SODIUM 80 MG/1
80 TABLET ORAL DAILY
Status: DISCONTINUED | OUTPATIENT
Start: 2018-02-02 | End: 2018-02-04 | Stop reason: HOSPADM

## 2018-02-02 RX ORDER — INSULIN GLARGINE 100 [IU]/ML
10 INJECTION, SOLUTION SUBCUTANEOUS
Status: DISCONTINUED | OUTPATIENT
Start: 2018-02-02 | End: 2018-02-04 | Stop reason: HOSPADM

## 2018-02-02 RX ORDER — MELATONIN
1000 DAILY
Status: DISCONTINUED | OUTPATIENT
Start: 2018-02-02 | End: 2018-02-04 | Stop reason: HOSPADM

## 2018-02-02 RX ORDER — DOXAZOSIN 2 MG/1
1 TABLET ORAL
Status: DISCONTINUED | OUTPATIENT
Start: 2018-02-02 | End: 2018-02-04 | Stop reason: HOSPADM

## 2018-02-02 RX ORDER — LABETALOL HYDROCHLORIDE 5 MG/ML
10 INJECTION, SOLUTION INTRAVENOUS ONCE
Status: COMPLETED | OUTPATIENT
Start: 2018-02-02 | End: 2018-02-02

## 2018-02-02 RX ORDER — HEPARIN SODIUM 5000 [USP'U]/ML
5000 INJECTION, SOLUTION INTRAVENOUS; SUBCUTANEOUS EVERY 8 HOURS SCHEDULED
Status: DISCONTINUED | OUTPATIENT
Start: 2018-02-02 | End: 2018-02-02

## 2018-02-02 RX ORDER — ARIPIPRAZOLE 15 MG/1
30 TABLET ORAL DAILY
Status: DISCONTINUED | OUTPATIENT
Start: 2018-02-02 | End: 2018-02-04 | Stop reason: HOSPADM

## 2018-02-02 RX ORDER — SERTRALINE HYDROCHLORIDE 100 MG/1
200 TABLET, FILM COATED ORAL DAILY
Status: DISCONTINUED | OUTPATIENT
Start: 2018-02-02 | End: 2018-02-04 | Stop reason: HOSPADM

## 2018-02-02 RX ADMIN — INSULIN GLARGINE 10 UNITS: 100 INJECTION, SOLUTION SUBCUTANEOUS at 21:13

## 2018-02-02 RX ADMIN — CLONIDINE HYDROCHLORIDE 0.3 MG: 0.1 TABLET ORAL at 21:17

## 2018-02-02 RX ADMIN — DEXTROSE MONOHYDRATE 25 ML: 25 INJECTION, SOLUTION INTRAVENOUS at 06:30

## 2018-02-02 RX ADMIN — SIMETHICONE CHEW TAB 80 MG 80 MG: 80 TABLET ORAL at 14:45

## 2018-02-02 RX ADMIN — HYDRALAZINE HYDROCHLORIDE 5 MG: 20 INJECTION INTRAMUSCULAR; INTRAVENOUS at 19:41

## 2018-02-02 RX ADMIN — SODIUM BICARBONATE 650 MG TABLET 1300 MG: at 17:30

## 2018-02-02 RX ADMIN — METOCLOPRAMIDE 10 MG: 5 INJECTION, SOLUTION INTRAMUSCULAR; INTRAVENOUS at 12:47

## 2018-02-02 RX ADMIN — CIPROFLOXACIN HYDROCHLORIDE 500 MG: 500 TABLET, FILM COATED ORAL at 12:41

## 2018-02-02 RX ADMIN — LABETALOL 20 MG/4 ML (5 MG/ML) INTRAVENOUS SYRINGE 10 MG: at 07:06

## 2018-02-02 RX ADMIN — BUSPIRONE HYDROCHLORIDE 5 MG: 5 TABLET ORAL at 12:41

## 2018-02-02 RX ADMIN — DULOXETINE HYDROCHLORIDE 60 MG: 60 CAPSULE, DELAYED RELEASE ORAL at 12:40

## 2018-02-02 RX ADMIN — SODIUM BICARBONATE 650 MG TABLET 1300 MG: at 12:41

## 2018-02-02 RX ADMIN — TACROLIMUS 3 MG: 1 CAPSULE ORAL at 21:17

## 2018-02-02 RX ADMIN — AMLODIPINE BESYLATE 10 MG: 10 TABLET ORAL at 12:42

## 2018-02-02 RX ADMIN — ASPIRIN 81 MG 81 MG: 81 TABLET ORAL at 12:41

## 2018-02-02 RX ADMIN — SERTRALINE HYDROCHLORIDE 200 MG: 100 TABLET ORAL at 12:41

## 2018-02-02 RX ADMIN — METOPROLOL TARTRATE 50 MG: 50 TABLET ORAL at 12:41

## 2018-02-02 RX ADMIN — CIPROFLOXACIN HYDROCHLORIDE 500 MG: 500 TABLET, FILM COATED ORAL at 21:17

## 2018-02-02 RX ADMIN — DULOXETINE HYDROCHLORIDE 60 MG: 60 CAPSULE, DELAYED RELEASE ORAL at 17:30

## 2018-02-02 RX ADMIN — SODIUM CHLORIDE 1000 ML: 0.9 INJECTION, SOLUTION INTRAVENOUS at 06:32

## 2018-02-02 RX ADMIN — FUROSEMIDE 20 MG: 20 TABLET ORAL at 12:41

## 2018-02-02 RX ADMIN — ROPINIROLE 0.25 MG: 0.25 TABLET, FILM COATED ORAL at 13:42

## 2018-02-02 RX ADMIN — HEPARIN SODIUM 5000 UNITS: 5000 INJECTION, SOLUTION INTRAVENOUS; SUBCUTANEOUS at 21:18

## 2018-02-02 RX ADMIN — ONDANSETRON 4 MG: 2 INJECTION INTRAMUSCULAR; INTRAVENOUS at 06:29

## 2018-02-02 RX ADMIN — LORAZEPAM 2 MG: 1 TABLET ORAL at 12:40

## 2018-02-02 RX ADMIN — METOPROLOL TARTRATE 50 MG: 50 TABLET ORAL at 17:30

## 2018-02-02 RX ADMIN — POTASSIUM CHLORIDE 40 MEQ: 1500 TABLET, EXTENDED RELEASE ORAL at 12:46

## 2018-02-02 RX ADMIN — ACETAMINOPHEN 650 MG: 325 TABLET, FILM COATED ORAL at 15:49

## 2018-02-02 RX ADMIN — PREDNISONE 5 MG: 5 TABLET ORAL at 12:41

## 2018-02-02 RX ADMIN — BUSPIRONE HYDROCHLORIDE 5 MG: 5 TABLET ORAL at 17:30

## 2018-02-02 RX ADMIN — VITAMIN D, TAB 1000IU (100/BT) 1000 UNITS: 25 TAB at 12:40

## 2018-02-02 RX ADMIN — ONDANSETRON 4 MG: 2 INJECTION INTRAMUSCULAR; INTRAVENOUS at 07:31

## 2018-02-02 RX ADMIN — TACROLIMUS 4 MG: 1 CAPSULE ORAL at 12:40

## 2018-02-02 RX ADMIN — CLONIDINE HYDROCHLORIDE 0.3 MG: 0.1 TABLET ORAL at 13:42

## 2018-02-02 RX ADMIN — DOXAZOSIN 1 MG: 2 TABLET ORAL at 21:16

## 2018-02-02 RX ADMIN — FOLIC ACID 1000 MCG: 1 TABLET ORAL at 12:42

## 2018-02-02 RX ADMIN — SODIUM CHLORIDE AND POTASSIUM CHLORIDE 100 ML/HR: .9; .15 SOLUTION INTRAVENOUS at 13:36

## 2018-02-02 RX ADMIN — LEVOTHYROXINE SODIUM 100 MCG: 100 TABLET ORAL at 12:40

## 2018-02-02 RX ADMIN — SODIUM CHLORIDE AND POTASSIUM CHLORIDE 100 ML/HR: .9; .15 SOLUTION INTRAVENOUS at 22:44

## 2018-02-02 RX ADMIN — PRAVASTATIN SODIUM 80 MG: 80 TABLET ORAL at 12:40

## 2018-02-02 RX ADMIN — HEPARIN SODIUM 5000 UNITS: 5000 INJECTION, SOLUTION INTRAVENOUS; SUBCUTANEOUS at 13:42

## 2018-02-02 RX ADMIN — ONDANSETRON 4 MG: 2 INJECTION INTRAMUSCULAR; INTRAVENOUS at 13:34

## 2018-02-02 NOTE — H&P
H&P- Simi Foil 1965, 47 y o  female MRN: [id number]    Unit/Bed#: [facility] 802-01 Encounter: [id number]    Primary Care Provider: Shabbir Mtz MD   Date and time admitted to hospital: 2/2/2018  5:48 AM        * Gastroenteritis, acute   Assessment & Plan    · Clear liquid diet for now on  · Supportive care with IV fluids  · Hopefully advanced diet in a m  and discharged home tomorrow        Type 2 diabetes mellitus with renal complication ([facility] [address] )   Assessment & Plan    · Will give less of her usual insulin given poor p o  intake  · Patient to be on clear liquids  · Watch sugars with fingersticks and sliding scale coverage        Acute cystitis without hematuria   Assessment & Plan    · UTI present on admission  · Minimal symptoms were given immunosuppressive therapy will treat with antibiotics        Stage 3 chronic kidney disease   Assessment & Plan    · Creatinine better than recent baseline  · Gentle IV fluid  · Repeat in a m  · Will hold Lasix tonight consider reinstitution in a m  Status post simultaneous kidney and pancreas transplant [facility])   Assessment & Plan    · Continue new suppressive therapy for kidney pancreas transplant        Hypokalemia   Assessment & Plan    · Replete   · check magnesium in a m  VTE Prophylaxis: Heparin  / reason for no mechanical VTE prophylaxis Not indicated   Code Status:  Full code  POLST: POLST form is not discussed and not completed at this time  Anticipated Length of Stay:  Patient will be admitted on an Observation basis with an anticipated length of stay of  less than 2 midnights  Justification for Hospital Stay:  Patient is a pancreas kidney transplant patient with intractable nausea vomiting requiring IV fluid      Total Time for Visit, including Counseling / Coordination of Care: 30 minutes  Greater than 50% of this total time spent on direct patient counseling and coordination of care      Chief Complaint:   I feel very sick    History of Present Illness: Hermilo Camarillo is a 47 y o  female who presents with nausea  Patient reports 2-3 days of increasing nausea poor p o  intake with malaise no fevers chills night sweats  Patient denies melena no hematochezia no bright red blood per rectum no hematemesis  No recent travel denies antibiotic last 6 months    Review of Systems:    Review of Systems   Constitutional: Positive for chills  Negative for diaphoresis, fatigue, fever and unexpected weight change  HENT: Negative for sinus pressure and sneezing  Eyes: Negative for photophobia and visual disturbance  Respiratory: Negative for cough, shortness of breath, wheezing and stridor  Cardiovascular: Negative for chest pain, palpitations and leg swelling  Gastrointestinal: Positive for diarrhea, nausea and vomiting  Negative for abdominal distention, blood in stool, constipation and rectal pain  Genitourinary: Negative for dysuria, frequency and urgency  Musculoskeletal: Negative for arthralgias and back pain  Skin: Negative for color change  Neurological: Positive for weakness  Negative for tremors and syncope  Hematological: Negative for adenopathy  Psychiatric/Behavioral: Negative for confusion         Past Medical and Surgical History:     Past Medical History:   Diagnosis Date    Anemia     Cancer ([facility] )     Multiple myeloma    Chronic diastolic (congestive) heart failure 9/18/2017    Diabetes mellitus ([facility] )     Previous, controlled with diet    Disease of thyroid gland     History of transfusion     Hypertension     Night blindness     Psychiatric disorder     Renal disorder     Retinopathy     Status post simultaneous kidney and pancreas transplant ([facility] )     Toe amputation status ([facility] )        Past Surgical History:   Procedure Laterality Date    CHOLECYSTECTOMY      COMBINED KIDNEY-PANCREAS TRANSPLANT N/A     CYSTOSCOPY N/A 10/13/2016    Procedure: CYSTOSCOPY, retrograde pyelogram, biopsy of ureteral polyp; Surgeon: Salvador Tabares MD;  Location: [facility];  Service:     ESOPHAGOGASTRODUODENOSCOPY N/A 11/20/2017    Procedure: ESOPHAGOGASTRODUODENOSCOPY (EGD); Surgeon: Mich Austin DO;  Location: [facility]; Service: Gastroenterology    EYE SURGERY      cataracts    FOOT AMPUTATION THROUGH METATARSAL Left     HALLUX VALGUS CORRECTION Right     NEPHRECTOMY TRANSPLANTED ORGAN         Meds/Allergies:    Prior to Admission medications    Medication Sig Start Date End Date Taking?  Authorizing Provider   amLODIPine (NORVASC) 10 mg tablet Take 10 mg by mouth 10mg in the morning & 5mg in the evening  2/3/15  Yes Historical Provider, MD   amLODIPine (NORVASC) 5 mg tablet Take 5 mg by mouth every evening   Yes Historical Provider, MD   ARIPiprazole (ABILIFY) 30 mg tablet Take 1 tablet by mouth 2/18/16  Yes Historical Provider, MD   aspirin 81 MG tablet Take 1 tablet by mouth daily 6/5/13  Yes Historical Provider, MD   busPIRone (BUSPAR) 5 mg tablet Take 1 tablet by mouth 2 (two) times a day for 180 days 1/25/18 7/24/18 Yes Rosaura Miller MD   Cholecalciferol (VITAMIN D3) 1000 units CAPS Take by mouth   Yes Historical Provider, MD   cloNIDine (CATAPRES) 0 1 mg tablet Take 0 3 mg by mouth every 8 (eight) hours   11/7/17  Yes Historical Provider, MD   doxazosin (CARDURA) 1 mg tablet Take 1 tablet by mouth   Yes Historical Provider, MD   DULoxetine (CYMBALTA) 60 mg delayed release capsule Take 1 capsule by mouth 2 (two) times a day 12/19/11  Yes Historical Provider, MD   folic acid (FOLVITE) 1 mg tablet Take 1 tablet by mouth daily 6/17/13  Yes Historical Provider, MD   furosemide (LASIX) 20 mg tablet Take 1 tablet by mouth daily 8/8/17  Yes Historical Provider, MD   glucose blood (ONE TOUCH ULTRA TEST) test strip by In Vitro route 2/3/12  Yes Historical Provider, MD   Insulin Glargine (TOUJEO SOLOSTAR) injection pen 300 units/mL Inject 20 Units under the skin daily at bedtime 8/14/17  Yes Historical Provider, MD   insulin lispro (HUMALOG KWIKPEN) 100 Units/mL SOPN Inject under the skin 8/16/17  Yes Historical Provider, MD   Insulin Pen Needle (BD PEN NEEDLE VISHNU U/F) 32G X 4 MM MISC by Does not apply route 8/10/17  Yes Historical Provider, MD   Lancets (ONETOUCH ULTRASOFT) lancets by Does not apply route 2 (two) times a day 3/9/16  Yes Historical Provider, MD   levothyroxine 100 mcg tablet Take by mouth 12/8/17  Yes Historical Provider, MD   LORazepam (ATIVAN) 2 mg tablet Take 1 tablet by mouth 3 (three) times a day as needed 10/7/15  Yes Historical Provider, MD   metoprolol tartrate (LOPRESSOR) 50 mg tablet Take 1 tablet by mouth 2 (two) times a day 10/15/13  Yes Historical Provider, MD   pravastatin (PRAVACHOL) 80 mg tablet TAKE 1 TABLET BY MOUTH DAILY 1/29/18  Yes Gil Common, MD   predniSONE 5 mg tablet Take 1 tablet by mouth daily 1/4/12  Yes Historical Provider, MD   rOPINIRole (REQUIP) 0 25 mg tablet Take 1 tablet by mouth 2 (two) times a day 7/21/15  Yes Historical Provider, MD   sertraline (ZOLOFT) 100 mg tablet Take 2 tablets by mouth daily 12/19/11  Yes Historical Provider, MD   sodium bicarbonate 650 mg tablet Take 2 tablets by mouth 2 (two) times a day   1/23/18  Yes Historical Provider, MD   tacrolimus (PROGRAF) 1 mg capsule Take 4 mg by mouth daily     Yes Historical Provider, MD   tacrolimus (PROGRAF) 1 mg capsule Take 3 mg by mouth daily at bedtime   Yes Historical Provider, MD   traZODone (DESYREL) 150 mg tablet Take 1 tablet by mouth daily at bedtime 4/3/13 2/2/18 Yes Historical Provider, MD     I have reviewed home medications with patient personally  Allergies:    Allergies   Allergen Reactions    Cefadroxil     Morphine And Related GI Intolerance    Penicillins Hives     Tolerates cefazolin    Myrbetriq [Mirabegron] Hives       Social History:     Marital Status: Legally    Occupation:   Patient Pre-hospital Living Situation:   Patient Pre-hospital Level of Mobility:   Patient Pre-hospital Diet Restrictions:   Substance Use History:   History   Alcohol Use No     History   Smoking Status    Former Smoker    Quit date: 4/28/2012   Smokeless Tobacco    Former User     History   Drug Use    Types: Hydrocodone, Cocaine     Comment: Past cocaine use many years ago - no current use       Family History:    Family History   Problem Relation Age of Onset    Hypertension Mother     Cancer Mother     Hypertension Father     Cancer Maternal Grandfather     Cancer Paternal Grandmother     Cancer Paternal Grandfather     Depression Sister        Physical Exam:     Vitals:   Blood Pressure: (!) 193/82 (02/02/18 1115)  Pulse: 96 (02/02/18 1115)  Temperature: 97 9 °F (36 6 °C) (02/02/18 0539)  Temp Source: Oral (02/02/18 0539)  Respirations: 18 (02/02/18 1115)  Weight - Scale: 99 8 kg (220 lb) (02/02/18 0540)  SpO2: 97 % (02/02/18 1115)    Physical Exam   Constitutional: She is oriented to person, place, and time  No distress  HENT:   Mouth/Throat: No oropharyngeal exudate  Eyes: EOM are normal  Pupils are equal, round, and reactive to light  Right eye exhibits no discharge  Left eye exhibits no discharge  No scleral icterus  Neck: Normal range of motion  Neck supple  No JVD present  Cardiovascular: Normal rate  Exam reveals no gallop and no friction rub  No murmur heard  Pulmonary/Chest: Effort normal and breath sounds normal  No respiratory distress  She has no wheezes  She has no rales  She exhibits no tenderness  Abdominal: Soft  She exhibits no distension and no mass  There is no tenderness  There is no rebound and no guarding  Musculoskeletal: Normal range of motion  She exhibits no edema or tenderness  Lymphadenopathy:     She has no cervical adenopathy  Neurological: She is alert and oriented to person, place, and time  Skin: Skin is warm and dry  She is not diaphoretic  No erythema     Psychiatric: Thought content normal      Additional Data: Lab Results: I have personally reviewed pertinent reports  Results from last 7 days  Lab Units 02/02/18  0629   WBC Thousand/uL 13 24*   HEMOGLOBIN g/dL 10 2*   HEMATOCRIT % 32 5*   PLATELETS Thousands/uL 272   NEUTROS PCT % 84*   LYMPHS PCT % 10*   MONOS PCT % 6   EOS PCT % 0       Results from last 7 days  Lab Units 02/02/18  0629   SODIUM mmol/L 142   POTASSIUM mmol/L 3 0*   CHLORIDE mmol/L 111*   CO2 mmol/L 23   BUN mg/dL 21   CREATININE mg/dL 1 52*   CALCIUM mg/dL 9 5   TOTAL PROTEIN g/dL 9 8*   BILIRUBIN TOTAL mg/dL 0 35   ALK PHOS U/L 125*   ALT U/L 22   AST U/L 14   GLUCOSE RANDOM mg/dL 74       Results from last 7 days  Lab Units 02/02/18  0629   INR  1 19*       Imaging: I have personally reviewed pertinent reports  XR chest 2 views   Final Result by Loise Baumgarten, MD (02/02 0930)      No acute findings  Mild cardiomegaly         Workstation performed: [id number]         CT abdomen pelvis wo contrast   Final Result by Anamaria Sheikh MD (02/02 8188)      Stable exam   No acute pathology  Status post renal and pancreatic transplants  Workstation performed: [id number]               Allscripts / Epic Records Reviewed: Yes     ** Please Note: This note has been constructed using a voice recognition system   **

## 2018-02-02 NOTE — ASSESSMENT & PLAN NOTE
· Creatinine better than recent baseline  · Gentle IV fluid  · Repeat in a m  · Will hold Lasix tonight consider reinstitution in a m

## 2018-02-02 NOTE — ASSESSMENT & PLAN NOTE
· Clear liquid diet for now on  · Supportive care with IV fluids  · Hopefully advanced diet in a m  and discharged home tomorrow

## 2018-02-02 NOTE — ED ATTENDING ATTESTATION
I, Vijay Spencer, DO, saw and evaluated the patient  I have discussed the patient with the resident/non-physician practitioner and agree with the resident's/non-physician practitioner's findings, Plan of Care, and MDM as documented in the resident's/non-physician practitioner's note, except where noted  All available labs and Radiology studies were reviewed  At this point I agree with the current assessment done in the Emergency Department  I have conducted an independent evaluation of this patient a history and physical is as follows:    Renal and pancreatic transplant  Told pancreas is failing  Presents with diarrhea, abd pain and diarrhea  BG elevated, gave herself 10 units of insulin now bg 62  - did not take home meds    No fever  No blood in bm  abd pain diffuse   Left foot parital amp, dm ulcer right foot and great toe amp    Patient arrived to the emergency department complaining of diarrhea and abdominal pain  Patient has a history of multiple admissions in the past secondary to abdominal pain and urinary tract infection  Approximately 20 years ago patient had a pancreatic and renal transplant  Patient is on chronic immunosuppressive medications  She was told recently as per patient report that her pancreas is failing  Diarrhea described as nonbloody, watery type stool  Abdominal pain is diffuse and described as spasm like pain  Patient denies having fever chills  Patient denies shortness of breath, chest pain or back pain  Patient also denies dysuria  No medications were taken over the counter for diarrhea  Patient does have sick contacts with her nephew and her brother having similar gastrointestinal symptoms a few days prior  Additionally when patient arrived her systolic blood pressure ranged from 200-250  Patient did not take her hypertensive medications this morning  Patient denied headache and denied nausea  Patient has no focal neurological complaints      GEN: Appears uncomfortable, awake and alert  HEENT: EOMI, PERRLA, dry mucous membranes  CV: RRR, no Murmur  Resp:  CTA, no R/R/W  GI: soft, diffuse tenderness, no distention  Neuro: non focal motor exam, CN symmetric, normal speech  Skin: warm, dry  Musculoskeletal:  Left TMA and right great toe amputation      Abdominal pain with diarrhea  Chronic kidney disease  Hypokalemia-replete  Urinary tract infection    Abdominal pain possibly secondary to viral gastroenteritis cannot rule out other underlying abdominal source such as ischemic bowel or diverticulitis considering immunosuppressed state patient may present similarly  Check laboratory data as well as check CT abdomen  Continue with IV fluids and pain medications as well as antiemetics  Check urinalysis  Laboratory data with hypokalemia, otherwise rest of electrolytes appear to be within appropriate limits  WBC count 13 2 for other laboratory data on hematology within appropriate limits  CT scan report as stable, no acute pathology status post renal and pancreatic transplants  Urinalysis with nitrites and leukocytes concerning for urinary tract infection  Plan to initiate antibiotics  Patient likely has viral gastroenteritis along with UTI  Patient's pancreatic transplantation done greater than 20 years ago and with positioning of pancreas and that surgery patient has chronic urinary tract infections  Reassess patient is status post fluids and pain control for admission with observation versus discharged home      Critical Care Time  CritCare Time    Procedures

## 2018-02-02 NOTE — ASSESSMENT & PLAN NOTE
· UTI present on admission  · Minimal symptoms were given immunosuppressive therapy will treat with antibiotics

## 2018-02-02 NOTE — ASSESSMENT & PLAN NOTE
· Will give less of her usual insulin given poor p o  intake  · Patient to be on clear liquids  · Watch sugars with fingersticks and sliding scale coverage

## 2018-02-02 NOTE — ED PROVIDER NOTES
History  Chief Complaint   Patient presents with    Abdominal Pain     Pt states she has abdominal pain, heart burn, weakness, and has been dry heaving for 2 days  Pt states she has transplanted kidney and pancreas and her pancreas just stated to fail  Pt states her BS has been out of control for the first time since the implanted pancreas was put in       58-year-old female with history of renal transplant, pancreatic transplant in the 90s, type 1 diabetes and hypertension hyperlipidemia presents for evaluation of abdominal pain  Symptoms present for the past 24 hours  Localized to the lower abdomen  Cramping discomfort  Associated diarrhea as well as nausea but without vomiting  No chest pain or shortness of breath  No diaphoresis, fevers, chills  Sick contacts include family members with similar symptoms of diarrhea and nausea  No evidence of blood no melanotic stools  Normal urination  Decreased p o  intake secondary to nausea  No recent medication changes  States she did not eat very much food today but took her normal insulin regimen  Her glucose was 250 so she gave herself 10 units but did not eat anything prior to arrival   She states she did not take her blood pressure medications or most of her maintenance medications secondary to how she fell today  Prior to Admission Medications   Prescriptions Last Dose Informant Patient Reported? Taking?    ARIPiprazole (ABILIFY) 30 mg tablet 2/1/2018 at Unknown time  Yes Yes   Sig: Take 1 tablet by mouth   Cholecalciferol (VITAMIN D3) 1000 units CAPS 2/1/2018 at Unknown time  Yes Yes   Sig: Take by mouth   DULoxetine (CYMBALTA) 60 mg delayed release capsule 2/1/2018 at Unknown time  Yes Yes   Sig: Take 1 capsule by mouth 2 (two) times a day   Insulin Glargine (TOUJEO SOLOSTAR) injection pen 300 units/mL 2/1/2018 at Unknown time  Yes Yes   Sig: Inject 20 Units under the skin daily at bedtime   Insulin Pen Needle (BD PEN NEEDLE VISHNU U/F) 32G X 4 MM MISC 2/1/2018 at Unknown time  Yes Yes   Sig: by Does not apply route   LORazepam (ATIVAN) 2 mg tablet 2/1/2018 at Unknown time  Yes Yes   Sig: Take 1 tablet by mouth 3 (three) times a day as needed   Lancets (ONETOUCH ULTRASOFT) lancets 2/1/2018 at Unknown time  Yes Yes   Sig: by Does not apply route 2 (two) times a day   amLODIPine (NORVASC) 10 mg tablet 2/1/2018 at Unknown time Self Yes Yes   Sig: Take 10 mg by mouth 10mg in the morning & 5mg in the evening    amLODIPine (NORVASC) 5 mg tablet   Yes Yes   Sig: Take 5 mg by mouth every evening   aspirin 81 MG tablet 2/1/2018 at Unknown time  Yes Yes   Sig: Take 1 tablet by mouth daily   busPIRone (BUSPAR) 5 mg tablet 2/1/2018 at Unknown time  No Yes   Sig: Take 1 tablet by mouth 2 (two) times a day for 180 days   cloNIDine (CATAPRES) 0 1 mg tablet 2/1/2018 at Unknown time Self Yes Yes   Sig: Take 0 3 mg by mouth every 8 (eight) hours     doxazosin (CARDURA) 1 mg tablet 2/1/2018 at Unknown time  Yes Yes   Sig: Take 1 tablet by mouth   folic acid (FOLVITE) 1 mg tablet 2/1/2018 at Unknown time  Yes Yes   Sig: Take 1 tablet by mouth daily   furosemide (LASIX) 20 mg tablet 2/1/2018 at Unknown time  Yes Yes   Sig: Take 1 tablet by mouth daily   glucose blood (ONE TOUCH ULTRA TEST) test strip 2/1/2018 at Unknown time  Yes Yes   Sig: by In Vitro route   insulin lispro (HUMALOG KWIKPEN) 100 Units/mL SOPN 2/2/2018 at Unknown time  Yes Yes   Sig: Inject under the skin   levothyroxine 100 mcg tablet 2/1/2018 at Unknown time  Yes Yes   Sig: Take by mouth   metoprolol tartrate (LOPRESSOR) 50 mg tablet 2/1/2018 at Unknown time  Yes Yes   Sig: Take 1 tablet by mouth 2 (two) times a day   pravastatin (PRAVACHOL) 80 mg tablet 2/1/2018 at Unknown time  No Yes   Sig: TAKE 1 TABLET BY MOUTH DAILY   predniSONE 5 mg tablet 2/1/2018 at Unknown time  Yes Yes   Sig: Take 1 tablet by mouth daily   rOPINIRole (REQUIP) 0 25 mg tablet 2/1/2018 at Unknown time  Yes Yes   Sig: Take 1 tablet by mouth 2 (two) times a day   sertraline (ZOLOFT) 100 mg tablet 2/1/2018 at Unknown time  Yes Yes   Sig: Take 2 tablets by mouth daily   sodium bicarbonate 650 mg tablet 2/1/2018 at Unknown time  Yes Yes   Sig: Take 2 tablets by mouth 2 (two) times a day     tacrolimus (PROGRAF) 1 mg capsule 2/1/2018 at Unknown time  Yes Yes   Sig: Take 4 mg by mouth daily     tacrolimus (PROGRAF) 1 mg capsule   Yes Yes   Sig: Take 3 mg by mouth daily at bedtime      Facility-Administered Medications: None       Past Medical History:   Diagnosis Date    Anemia     Cancer (HCC)     Multiple myeloma    Chronic diastolic (congestive) heart failure 9/18/2017    Diabetes mellitus ([facility] [address] )     Previous, controlled with diet    Disease of thyroid gland     History of transfusion     Hypertension     Night blindness     Psychiatric disorder     Renal disorder     Retinopathy     Status post simultaneous [facility] ([facility] )     Toe amputation status ([facility] )        Past Surgical History:   Procedure Laterality Date    CHOLECYSTECTOMY      COMBINED KIDNEY-PANCREAS TRANSPLANT N/A     CYSTOSCOPY N/A 10/13/2016    Procedure: CYSTOSCOPY, retrograde pyelogram, biopsy of ureteral polyp; Surgeon: Anika Green MD;  [facility];  Service:     ESOPHAGOGASTRODUODENOSCOPY N/A 11/20/2017    Procedure: ESOPHAGOGASTRODUODENOSCOPY (EGD); Surgeon: Lamar Curry DO;  Location: [facility]; Service: Gastroenterology    EYE SURGERY      cataracts    FOOT AMPUTATION THROUGH METATARSAL Left     HALLUX VALGUS CORRECTION Right     NEPHRECTOMY TRANSPLANTED ORGAN         Family History   Problem Relation Age of Onset    Hypertension Mother     Cancer Mother     Hypertension Father     Cancer Maternal Grandfather     Cancer Paternal Grandmother     Cancer Paternal Grandfather     Depression Sister      I have reviewed and agree with the history as documented      Social History   Substance Use Topics    Smoking status: Former Smoker     Quit date: 4/28/2012    Smokeless tobacco: Former User    Alcohol use No        Review of Systems   Constitutional: Negative for chills, fatigue and fever  HENT: Negative for congestion  Eyes: Negative for visual disturbance  Respiratory: Negative for chest tightness and shortness of breath  Cardiovascular: Negative for chest pain and palpitations  Gastrointestinal: Positive for abdominal pain, diarrhea and nausea  Negative for abdominal distention, blood in stool, constipation, rectal pain and vomiting  Genitourinary: Negative for dysuria, hematuria, vaginal bleeding and vaginal discharge  Musculoskeletal: Negative for arthralgias, back pain and gait problem  Skin: Negative for rash  Neurological: Negative for dizziness, syncope, weakness, light-headedness, numbness and headaches  Physical Exam  ED Triage Vitals   Temperature Pulse Respirations Blood Pressure SpO2   02/02/18 0539 02/02/18 0539 02/02/18 0539 02/02/18 0539 02/02/18 0539   97 9 °F (36 6 °C) 103 16 (!) 204/86 100 %      Temp Source Heart Rate Source Patient Position - Orthostatic VS BP Location FiO2 (%)   02/02/18 0539 02/02/18 0734 02/02/18 0734 02/02/18 0734 --   Oral Monitor Lying Left arm       Pain Score       02/02/18 0700       No Pain           Orthostatic Vital Signs  Vitals:    02/02/18 1219 02/02/18 1500 02/02/18 1840 02/02/18 1941   BP: (!) 183/80 (!) 208/76 (!) 192/76 (!) 192/76   Pulse: (!) 107 74 74    Patient Position - Orthostatic VS: Sitting Lying         Physical Exam   Constitutional: She is oriented to person, place, and time  Vital signs are normal  She appears well-developed and well-nourished  She does not appear ill  No distress  HENT:   Head: Normocephalic and atraumatic  Head is without abrasion and without contusion     Right Ear: Tympanic membrane normal    Left Ear: Tympanic membrane normal    Nose: Nose normal    Mouth/Throat: Uvula is midline, oropharynx is clear and moist and mucous membranes are normal    Eyes: Conjunctivae and EOM are normal  Pupils are equal, round, and reactive to light  Neck: Trachea normal, normal range of motion, full passive range of motion without pain and phonation normal  Neck supple  No JVD present  No spinous process tenderness and no muscular tenderness present  Carotid bruit is not present  Normal range of motion present  No thyromegaly present  Cardiovascular: Normal rate, regular rhythm and intact distal pulses  Exam reveals no friction rub  No murmur heard  Pulmonary/Chest: Effort normal and breath sounds normal  No accessory muscle usage or stridor  No tachypnea  No respiratory distress  She has no decreased breath sounds  She has no wheezes  She has no rhonchi  She has no rales  She exhibits no tenderness, no crepitus, no edema and no retraction  Abdominal: Soft  Normal appearance  She exhibits no distension  Bowel sounds are increased  There is no hepatosplenomegaly  There is tenderness in the right lower quadrant, suprapubic area and left lower quadrant  There is no rigidity, no rebound, no guarding and no CVA tenderness  Musculoskeletal: Normal range of motion  Lymphadenopathy:     She has no cervical adenopathy  Neurological: She is alert and oriented to person, place, and time  She has normal strength  No sensory deficit  GCS eye subscore is 4  GCS verbal subscore is 5  GCS motor subscore is 6  Skin: Skin is warm, dry and intact  No rash noted  She is not diaphoretic  Psychiatric: She has a normal mood and affect  Nursing note and vitals reviewed        ED Medications  Medications   tacrolimus (PROGRAF) capsule 4 mg (4 mg Oral Given 2/2/18 1240)   sertraline (ZOLOFT) tablet 200 mg (200 mg Oral Given 2/2/18 1241)   rOPINIRole (REQUIP) tablet 0 25 mg (0 25 mg Oral Given 2/2/18 1342)   predniSONE tablet 5 mg (5 mg Oral Given 2/2/18 1241)   metoprolol tartrate (LOPRESSOR) tablet 50 mg (50 mg Oral Given 2/2/18 1730) LORazepam (ATIVAN) tablet 2 mg (2 mg Oral Given 2/2/18 1240)   levothyroxine tablet 100 mcg (100 mcg Oral Given 2/2/18 1240)   furosemide (LASIX) tablet 20 mg (20 mg Oral Given 5/1/57 1184)   folic acid (FOLVITE) tablet 1,000 mcg (1,000 mcg Oral Given 2/2/18 1242)   DULoxetine (CYMBALTA) delayed release capsule 60 mg (60 mg Oral Given 2/2/18 1730)   doxazosin (CARDURA) tablet 1 mg (not administered)   cloNIDine (CATAPRES) tablet 0 3 mg (0 3 mg Oral Given 2/2/18 1342)   ARIPiprazole (ABILIFY) tablet 30 mg (30 mg Oral Not Given 2/2/18 1330)   amLODIPine (NORVASC) tablet 10 mg (10 mg Oral Given 2/2/18 1242)   cholecalciferol (VITAMIN D3) tablet 1,000 Units (1,000 Units Oral Given 2/2/18 1240)   sodium bicarbonate tablet 1,300 mg (1,300 mg Oral Given 2/2/18 1730)   aspirin chewable tablet 81 mg (81 mg Oral Given 2/2/18 1241)   busPIRone (BUSPAR) tablet 5 mg (5 mg Oral Given 2/2/18 1730)   pravastatin (PRAVACHOL) tablet 80 mg (80 mg Oral Given 2/2/18 1240)   tacrolimus (PROGRAF) capsule 3 mg (not administered)   sodium chloride 0 9 % with KCl 20 mEq/L infusion (premix) (100 mL/hr Intravenous New Bag 2/2/18 1336)   acetaminophen (TYLENOL) tablet 650 mg (650 mg Oral Given 2/2/18 1549)   insulin glargine (LANTUS) subcutaneous injection 10 Units (not administered)   metoclopramide (REGLAN) injection 10 mg (10 mg Intravenous Given 2/2/18 1247)   ondansetron (ZOFRAN) injection 4 mg (4 mg Intravenous Given 2/2/18 1334)   simethicone (MYLICON) chewable tablet 80 mg (80 mg Oral Given 2/2/18 1445)   insulin lispro (HumaLOG) 100 units/mL subcutaneous injection 1-5 Units (1 Units Subcutaneous Not Given 2/2/18 1749)   insulin lispro (HumaLOG) 100 units/mL subcutaneous injection 1-5 Units (not administered)   ciprofloxacin (CIPRO) tablet 500 mg (500 mg Oral Given 2/2/18 1241)   heparin (porcine) subcutaneous injection 5,000 Units (not administered)   hydrALAZINE (APRESOLINE) injection 5 mg (5 mg Intravenous Given 2/2/18 1941) sodium chloride 0 9 % bolus 1,000 mL (0 mL Intravenous Stopped 2/2/18 0816)   ondansetron (ZOFRAN) injection 4 mg (4 mg Intravenous Given 2/2/18 0629)   dextrose 50 % IV solution 25 mL (25 mL Intravenous Given 2/2/18 0630)   labetalol (NORMODYNE) injection 10 mg (10 mg Intravenous Given 2/2/18 0706)   ondansetron (ZOFRAN) injection 4 mg (4 mg Intravenous Given 2/2/18 0731)   potassium chloride (K-DUR,KLOR-CON) CR tablet 40 mEq (40 mEq Oral Given 2/2/18 1246)       Diagnostic Studies  Results Reviewed     Procedure Component Value Units Date/Time    Urine Microscopic [[id number]]  (Abnormal) Collected:  02/02/18 0819    Lab Status:  Final result Specimen:  Urine from Urine, Clean Catch Updated:  02/02/18 0933     RBC, UA 2-4 (A) /hpf      WBC, UA Innumerable (A) /hpf      Epithelial Cells None Seen /hpf      Bacteria, UA Innumerable (A) /hpf      OTHER OBSERVATIONS WBCs Clumped    Urine culture [[id number]] Collected:  02/02/18 0819    Lab Status:   In process Specimen:  Urine from Urine, Clean Catch Updated:  02/02/18 0933    POCT urinalysis dipstick [[id number]]  (Abnormal) Resulted:  02/02/18 0815    Lab Status:  Final result Updated:  02/02/18 0856    Fingerstick Glucose (POCT) [[id number]]  (Abnormal) Collected:  02/02/18 0605    Lab Status:  Final result Updated:  02/02/18 0839     POC Glucose 63 (L) mg/dl     ED Urine Macroscopic [[id number]]  (Abnormal) Collected:  02/02/18 0819    Lab Status:  Final result Specimen:  Urine Updated:  02/02/18 0815     Color, UA Yellow     Clarity, UA Cloudy     pH, UA 6 0     Leukocytes, UA Large (A)     Nitrite, UA Positive (A)     Protein, UA >=300 (A) mg/dl      Glucose, UA Negative mg/dl      Ketones, UA Negative mg/dl      Urobilinogen, UA 0 2 E U /dl      Bilirubin, UA Negative     Blood, UA Moderate (A)     Specific [place], UA 1 020    Narrative:       CLINITEK RESULT    Troponin I [[id number]]  (Normal) Collected:  02/02/18 0629    Lab Status:  Final result Specimen:  Blood from Arm, Right Updated:  02/02/18 0721     Troponin I <0 02 ng/mL     Narrative:         [employer] Chemistry analyzer 99% cutoff is > 0 04 ng/mL in network labs    o cTnI 99% cutoff is useful only when applied to patients in the clinical setting of myocardial ischemia  o cTnI 99% cutoff should be interpreted in the context of clinical history, ECG findings and possibly cardiac imaging to establish correct diagnosis  o cTnI 99% cutoff may be suggestive but clearly not indicative of a coronary event without the clinical setting of myocardial ischemia  Fingerstick Glucose (POCT) [[id number]]  (Normal) Collected:  02/02/18 0719    Lab Status:  Final result Updated:  02/02/18 0720     POC Glucose 103 mg/dl     Comprehensive metabolic panel [[id number]]  (Abnormal) Collected:  02/02/18 0629    Lab Status:  Final result Specimen:  Blood from Arm, Right Updated:  02/02/18 0719     Sodium 142 mmol/L      Potassium 3 0 (L) mmol/L      Chloride 111 (H) mmol/L      CO2 23 mmol/L      Anion Gap 8 mmol/L      BUN 21 mg/dL      Creatinine 1 52 (H) mg/dL      Glucose 74 mg/dL      Calcium 9 5 mg/dL      AST 14 U/L      ALT 22 U/L      Alkaline Phosphatase 125 (H) U/L      Total Protein 9 8 (H) g/dL      Albumin 3 0 (L) g/dL      Total Bilirubin 0 35 mg/dL      eGFR 39 ml/min/1 73sq m     Narrative:         National Kidney Disease Education Program recommendations are as follows:  GFR calculation is accurate only with a steady state creatinine  Chronic Kidney disease less than 60 ml/min/1 73 sq  meters  Kidney failure less than 15 ml/min/1 73 sq  meters      Lipase [[id number]]  (Normal) Collected:  02/02/18 0629    Lab Status:  Final result Specimen:  Blood from Arm, Right Updated:  02/02/18 0719     Lipase 101 u/L     Protime-INR [[id number]]  (Abnormal) Collected:  02/02/18 0629    Lab Status:  Final result Specimen:  Blood from Arm, Right Updated:  02/02/18 0705     Protime 15 2 (H) seconds      INR 1 19 (H)    APTT [[id number]] (Normal) Collected:  02/02/18 0629    Lab Status:  Final result Specimen:  Blood from Arm, Right Updated:  02/02/18 0705     PTT 26 seconds     Narrative: Therapeutic Heparin Range = 60-90 seconds    CBC and differential [[id number]]  (Abnormal) Collected:  02/02/18 0629    Lab Status:  Final result Specimen:  Blood from Arm, Right Updated:  02/02/18 0658     WBC 13 24 (H) Thousand/uL      RBC 3 83 Million/uL      Hemoglobin 10 2 (L) g/dL      Hematocrit 32 5 (L) %      MCV 85 fL      MCH 26 6 (L) pg      MCHC 31 4 g/dL      RDW 18 1 (H) %      MPV 11 5 fL      Platelets 230 Thousands/uL      nRBC 0 /100 WBCs      Neutrophils Relative 84 (H) %      Lymphocytes Relative 10 (L) %      Monocytes Relative 6 %      Eosinophils Relative 0 %      Basophils Relative 0 %      Neutrophils Absolute 10 90 (H) Thousands/µL      Lymphocytes Absolute 1 37 Thousands/µL      Monocytes Absolute 0 76 Thousand/µL      Eosinophils Absolute 0 04 Thousand/µL      Basophils Absolute 0 03 Thousands/µL                  XR chest 2 views   Final Result by Lamar Jacobson MD (02/02 0930)      No acute findings  Mild cardiomegaly         Workstation performed: [id number]         CT abdomen pelvis wo contrast   Final Result by Altaf Renae MD (02/02 8817)      Stable exam   No acute pathology  Status post renal and pancreatic transplants  Workstation performed: [id number]               Procedures  Procedures      Phone Consults  ED Phone Contact    ED Course  ED Course as of Feb 02 2106 Fri Feb 02, 2018   0732 EKG:  Sinus rhythm, rate 98  No significant ST-T changes                                    MDM  CritCare Time    Disposition  Final diagnoses:   Hypokalemia   Diarrhea   Intractable nausea and vomiting     Time reflects when diagnosis was documented in both MDM as applicable and the Disposition within this note     Time User Action Codes Description Comment    2/2/2018  7:35 AM Chano Hogan Add [E87 6] Hypokalemia     2/2/2018  7:35 AM Kristi Sy Add [R19 7] Diarrhea     2/2/2018 10:21 AM Jorge Marco Add [R11 2] Intractable nausea and vomiting       ED Disposition     ED Disposition Condition Comment    Admit  Case was discussed with AJAY and the patient's admission status was agreed to be Admission Status: observation status to the service of Dr Gerardo Clemente           Follow-up Information    None       Current Discharge Medication List      CONTINUE these medications which have NOT CHANGED    Details   !! amLODIPine (NORVASC) 10 mg tablet Take 10 mg by mouth 10mg in the morning & 5mg in the evening       !! amLODIPine (NORVASC) 5 mg tablet Take 5 mg by mouth every evening      ARIPiprazole (ABILIFY) 30 mg tablet Take 1 tablet by mouth    Associated Diagnoses: Major depressive disorder, recurrent episode, moderate (HCC)      aspirin 81 MG tablet Take 1 tablet by mouth daily      busPIRone (BUSPAR) 5 mg tablet Take 1 tablet by mouth 2 (two) times a day for 180 days  Qty: 180 tablet, Refills: 1    Associated Diagnoses: FELIPE (generalized anxiety disorder)      Cholecalciferol (VITAMIN D3) 1000 units CAPS Take by mouth      cloNIDine (CATAPRES) 0 1 mg tablet Take 0 3 mg by mouth every 8 (eight) hours        doxazosin (CARDURA) 1 mg tablet Take 1 tablet by mouth      DULoxetine (CYMBALTA) 60 mg delayed release capsule Take 1 capsule by mouth 2 (two) times a day    Associated Diagnoses: Major depressive disorder, recurrent episode, moderate (HCC); FELIPE (generalized anxiety disorder)      folic acid (FOLVITE) 1 mg tablet Take 1 tablet by mouth daily      furosemide (LASIX) 20 mg tablet Take 1 tablet by mouth daily      glucose blood (ONE TOUCH ULTRA TEST) test strip by In Vitro route      Insulin Glargine (TOUJEO SOLOSTAR) injection pen 300 units/mL Inject 20 Units under the skin daily at bedtime      insulin lispro (HUMALOG KWIKPEN) 100 Units/mL SOPN Inject under the skin      Insulin Pen Needle (BD PEN NEEDLE VISHNU U/F) 32G X 4 MM MISC by Does not apply route      Lancets (ONETOUCH ULTRASOFT) lancets by Does not apply route 2 (two) times a day      levothyroxine 100 mcg tablet Take by mouth      LORazepam (ATIVAN) 2 mg tablet Take 1 tablet by mouth 3 (three) times a day as needed    Associated Diagnoses: FELIPE (generalized anxiety disorder)      metoprolol tartrate (LOPRESSOR) 50 mg tablet Take 1 tablet by mouth 2 (two) times a day      pravastatin (PRAVACHOL) 80 mg tablet TAKE 1 TABLET BY MOUTH DAILY  Qty: 90 tablet, Refills: 5    Comments: **Patient requests 90 days supply**  Associated Diagnoses: Mixed hyperlipidemia      predniSONE 5 mg tablet Take 1 tablet by mouth daily      rOPINIRole (REQUIP) 0 25 mg tablet Take 1 tablet by mouth 2 (two) times a day      sertraline (ZOLOFT) 100 mg tablet Take 2 tablets by mouth daily    Associated Diagnoses: Major depressive disorder, recurrent episode, moderate (HCC); FELIPE (generalized anxiety disorder)      sodium bicarbonate 650 mg tablet Take 2 tablets by mouth 2 (two) times a day        !! tacrolimus (PROGRAF) 1 mg capsule Take 4 mg by mouth daily        !! tacrolimus (PROGRAF) 1 mg capsule Take 3 mg by mouth daily at bedtime       !! - Potential duplicate medications found  Please discuss with provider  No discharge procedures on file  ED Provider  Attending physically available and evaluated Shell Abimael MENA managed the patient along with the ED Attending      Electronically Signed by         Susanna Dillard DO  02/02/18 7062

## 2018-02-02 NOTE — SOCIAL WORK
Met with pt at bedside  Pt states she resides alone in a house with 3 JESSI  Pt states her parents Blanca Denis and Tashia Rubin are her [facility] [phone]  Pt states she is indep at home and uses a cane for ambulation  Pt states she has used [facility] in the past   pt was also at [facility] in the past  Pt states her PCP is Dr Vaibhav Martines in [place]  Pt uses [facility] on [address] in [place] [place]  Pt has hx of anxiety and depression and sees Dr Pedro Urias every 3 months  Pt states her parents will transport her home      CM reviewed d/c planning process including the following: identifying help at home, patient preference for d/c planning needs, Discharge Lounge, [facility] Meds to Bed program, availability of treatment team to discuss questions or concerns patient and/or family may have regarding understanding medications and recognizing signs and symptoms once discharged  CM also encouraged patient to follow up with all recommended appointments after discharge  Patient advised of importance for patient and family to participate in managing patients medical well being

## 2018-02-02 NOTE — ED NOTES
Patient had asked for pain medication, Dr Bautista made aware and ordered Tylenol - patient states that would not work well enough - Enrico Ca spoke with patient and is reviewing her chart for more appropriate and safe pain medication as patient has multiple allergies to medications     Shiv Syed RN  02/02/18 9118

## 2018-02-03 LAB
ANION GAP SERPL CALCULATED.3IONS-SCNC: 7 MMOL/L (ref 4–13)
BUN SERPL-MCNC: 17 MG/DL (ref 5–25)
CALCIUM SERPL-MCNC: 8.4 MG/DL (ref 8.3–10.1)
CHLORIDE SERPL-SCNC: 115 MMOL/L (ref 100–108)
CO2 SERPL-SCNC: 22 MMOL/L (ref 21–32)
CREAT SERPL-MCNC: 1.4 MG/DL (ref 0.6–1.3)
GFR SERPL CREATININE-BSD FRML MDRD: 43 ML/MIN/1.73SQ M
GLUCOSE SERPL-MCNC: 110 MG/DL (ref 65–140)
GLUCOSE SERPL-MCNC: 119 MG/DL (ref 65–140)
GLUCOSE SERPL-MCNC: 72 MG/DL (ref 65–140)
GLUCOSE SERPL-MCNC: 85 MG/DL (ref 65–140)
GLUCOSE SERPL-MCNC: 88 MG/DL (ref 65–140)
MAGNESIUM SERPL-MCNC: 1.8 MG/DL (ref 1.6–2.6)
PLATELET # BLD AUTO: 225 THOUSANDS/UL (ref 149–390)
PMV BLD AUTO: 11.5 FL (ref 8.9–12.7)
POTASSIUM SERPL-SCNC: 4.1 MMOL/L (ref 3.5–5.3)
SODIUM SERPL-SCNC: 144 MMOL/L (ref 136–145)

## 2018-02-03 PROCEDURE — 82948 REAGENT STRIP/BLOOD GLUCOSE: CPT

## 2018-02-03 PROCEDURE — 85049 AUTOMATED PLATELET COUNT: CPT | Performed by: HOSPITALIST

## 2018-02-03 PROCEDURE — 99225 PR SBSQ OBSERVATION CARE/DAY 25 MINUTES: CPT | Performed by: INTERNAL MEDICINE

## 2018-02-03 PROCEDURE — 80048 BASIC METABOLIC PNL TOTAL CA: CPT | Performed by: INTERNAL MEDICINE

## 2018-02-03 PROCEDURE — 83735 ASSAY OF MAGNESIUM: CPT | Performed by: INTERNAL MEDICINE

## 2018-02-03 RX ADMIN — ASPIRIN 81 MG 81 MG: 81 TABLET ORAL at 08:26

## 2018-02-03 RX ADMIN — CIPROFLOXACIN HYDROCHLORIDE 500 MG: 500 TABLET, FILM COATED ORAL at 21:52

## 2018-02-03 RX ADMIN — PREDNISONE 5 MG: 5 TABLET ORAL at 08:26

## 2018-02-03 RX ADMIN — DOXAZOSIN 1 MG: 2 TABLET ORAL at 21:52

## 2018-02-03 RX ADMIN — SODIUM BICARBONATE 650 MG TABLET 1300 MG: at 17:23

## 2018-02-03 RX ADMIN — PRAVASTATIN SODIUM 80 MG: 80 TABLET ORAL at 08:26

## 2018-02-03 RX ADMIN — DULOXETINE HYDROCHLORIDE 60 MG: 60 CAPSULE, DELAYED RELEASE ORAL at 17:24

## 2018-02-03 RX ADMIN — METOPROLOL TARTRATE 50 MG: 50 TABLET ORAL at 08:25

## 2018-02-03 RX ADMIN — TACROLIMUS 3 MG: 1 CAPSULE ORAL at 21:51

## 2018-02-03 RX ADMIN — ROPINIROLE 0.25 MG: 0.25 TABLET, FILM COATED ORAL at 10:00

## 2018-02-03 RX ADMIN — METOPROLOL TARTRATE 50 MG: 50 TABLET ORAL at 17:24

## 2018-02-03 RX ADMIN — FOLIC ACID 1000 MCG: 1 TABLET ORAL at 08:26

## 2018-02-03 RX ADMIN — LEVOTHYROXINE SODIUM 100 MCG: 100 TABLET ORAL at 06:26

## 2018-02-03 RX ADMIN — HEPARIN SODIUM 5000 UNITS: 5000 INJECTION, SOLUTION INTRAVENOUS; SUBCUTANEOUS at 06:26

## 2018-02-03 RX ADMIN — SERTRALINE HYDROCHLORIDE 200 MG: 100 TABLET ORAL at 08:26

## 2018-02-03 RX ADMIN — HEPARIN SODIUM 5000 UNITS: 5000 INJECTION, SOLUTION INTRAVENOUS; SUBCUTANEOUS at 21:52

## 2018-02-03 RX ADMIN — BUSPIRONE HYDROCHLORIDE 5 MG: 5 TABLET ORAL at 08:26

## 2018-02-03 RX ADMIN — CLONIDINE HYDROCHLORIDE 0.3 MG: 0.1 TABLET ORAL at 06:26

## 2018-02-03 RX ADMIN — ARIPIPRAZOLE 30 MG: 15 TABLET ORAL at 08:26

## 2018-02-03 RX ADMIN — HYDRALAZINE HYDROCHLORIDE 5 MG: 20 INJECTION INTRAMUSCULAR; INTRAVENOUS at 11:20

## 2018-02-03 RX ADMIN — AMLODIPINE BESYLATE 10 MG: 10 TABLET ORAL at 08:25

## 2018-02-03 RX ADMIN — CLONIDINE HYDROCHLORIDE 0.3 MG: 0.1 TABLET ORAL at 14:35

## 2018-02-03 RX ADMIN — CIPROFLOXACIN HYDROCHLORIDE 500 MG: 500 TABLET, FILM COATED ORAL at 08:25

## 2018-02-03 RX ADMIN — CLONIDINE HYDROCHLORIDE 0.3 MG: 0.1 TABLET ORAL at 21:51

## 2018-02-03 RX ADMIN — INSULIN GLARGINE 10 UNITS: 100 INJECTION, SOLUTION SUBCUTANEOUS at 22:02

## 2018-02-03 RX ADMIN — BUSPIRONE HYDROCHLORIDE 5 MG: 5 TABLET ORAL at 17:24

## 2018-02-03 RX ADMIN — HEPARIN SODIUM 5000 UNITS: 5000 INJECTION, SOLUTION INTRAVENOUS; SUBCUTANEOUS at 14:35

## 2018-02-03 RX ADMIN — SODIUM CHLORIDE AND POTASSIUM CHLORIDE 100 ML/HR: .9; .15 SOLUTION INTRAVENOUS at 09:22

## 2018-02-03 RX ADMIN — LORAZEPAM 2 MG: 1 TABLET ORAL at 21:52

## 2018-02-03 RX ADMIN — VITAMIN D, TAB 1000IU (100/BT) 1000 UNITS: 25 TAB at 08:25

## 2018-02-03 RX ADMIN — FUROSEMIDE 20 MG: 20 TABLET ORAL at 08:26

## 2018-02-03 RX ADMIN — TACROLIMUS 4 MG: 1 CAPSULE ORAL at 08:26

## 2018-02-03 RX ADMIN — ROPINIROLE 0.25 MG: 0.25 TABLET, FILM COATED ORAL at 17:31

## 2018-02-03 RX ADMIN — SODIUM BICARBONATE 650 MG TABLET 1300 MG: at 08:26

## 2018-02-03 RX ADMIN — DULOXETINE HYDROCHLORIDE 60 MG: 60 CAPSULE, DELAYED RELEASE ORAL at 08:26

## 2018-02-03 NOTE — PROGRESS NOTES
Progress Note - Janny Disla 1965, 47 y o  female MRN: [id number]    Unit/Bed#: [facility] 802-01 Encounter: [id number]    Primary Care Provider: Ac Lai MD   Date and time admitted to hospital: 2/2/2018  5:48 AM        Type 2 diabetes mellitus with renal complication ([facility] [address] )   Assessment & Plan    · Will give less of her usual insulin given poor p o  intake  · Patient to be on clear liquids  · Watch sugars with fingersticks and sliding scale coverage        Stage 3 chronic kidney disease   Assessment & Plan    · Creatinine better than recent baseline, improved  · Gentle IV fluid  · Repeat in a m  · Will hold Lasix tonight consider reinstitution in a m  Status post simultaneous kidney and pancreas transplant [facility])   Assessment & Plan    · Continue new suppressive therapy for kidney pancreas transplant        Hypokalemia   Assessment & Plan    · Replete   · check magnesium in a m  Acute cystitis without hematuria   Assessment & Plan    · UTI present on admission  · Minimal symptoms were given immunosuppressive therapy will treat with antibiotics  · Monitor in and output  · Follow cultures        * Gastroenteritis, acute   Assessment & Plan    · Clear liquid diet for now on changed, patient is tolerating  · Supportive care with IV fluids                VTE Pharmacologic Prophylaxis:    Pharmacologic: Pharmacologic VTE Prophylaxis contraindicated due to not indicated  Mechanical VTE Prophylaxis in Place: Yes    Patient Centered Rounds: I have performed bedside rounds with nursing staff today  Discussions with Specialists or Other Care Team Provider:      Education and Discussions with Family / Patient: patient    Time Spent for Care: 45 minutes  More than 50% of total time spent on counseling and coordination of care as described above      Current Length of Stay: 0 day(s)    Current Patient Status: Observation   Certification Statement: The patient will continue to require additional inpatient hospital stay due to gastroenteritis, hypertension    Discharge Plan: possible for tomorrow once bp is under control  Code Status: Level 1 - Full Code      Subjective:   I am under a lot of stress  My mom is diagnosed with cancer, she is really not doing well  [place] plans [place], now my mom being sick, we are not able to go  I have a lot of stress in my life, I am not able to handle it  My psych doctor changed some medications for me they are not working as of yet  Objective:     Vitals:   Temp (24hrs), Av 3 °F (36 8 °C), Min:98 °F (36 7 °C), Max:98 5 °F (36 9 °C)    HR:  [63-80] 73  Resp:  [16-18] 16  BP: (155-192)/(60-81) 178/76  SpO2:  [94 %-97 %] 94 %  Body mass index is 34 46 kg/m²  Input and Output Summary (last 24 hours): Intake/Output Summary (Last 24 hours) at 18 1715  Last data filed at 18 1456   Gross per 24 hour   Intake          3401 67 ml   Output             2125 ml   Net          1276 67 ml       Physical Exam:     Physical Exam   Constitutional: She is oriented to person, place, and time  She appears well-developed and well-nourished  No distress  HENT:   Head: Normocephalic and atraumatic  Right Ear: External ear normal    Left Ear: External ear normal    Nose: Nose normal    Mouth/Throat: Oropharynx is clear and moist  No oropharyngeal exudate  Eyes: Conjunctivae and EOM are normal  Pupils are equal, round, and reactive to light  Right eye exhibits no discharge  Left eye exhibits no discharge  No scleral icterus  Neck: Normal range of motion  Neck supple  No JVD present  No tracheal deviation present  No thyromegaly present  Pulmonary/Chest: Breath sounds normal  No stridor  She is in respiratory distress  She has no wheezes  She has no rales  She exhibits no tenderness  Abdominal: Soft  Bowel sounds are normal  She exhibits no distension and no mass  There is no tenderness  There is no rebound and no guarding  Musculoskeletal: Normal range of motion  She exhibits no edema, tenderness or deformity  Lymphadenopathy:     She has no cervical adenopathy  Neurological: She is alert and oriented to person, place, and time  She displays normal reflexes  No cranial nerve deficit  She exhibits normal muscle tone  Coordination normal    Skin: Skin is warm and dry  No rash noted  She is not diaphoretic  No erythema  No pallor  Psychiatric:   Depressed mood, tearing   Nursing note and vitals reviewed  Additional Data:     Labs:      Results from last 7 days  Lab Units 02/03/18  0630 02/02/18  0629   WBC Thousand/uL  --  13 24*   HEMOGLOBIN g/dL  --  10 2*   HEMATOCRIT %  --  32 5*   PLATELETS Thousands/uL 225 272   NEUTROS PCT %  --  84*   LYMPHS PCT %  --  10*   MONOS PCT %  --  6   EOS PCT %  --  0       Results from last 7 days  Lab Units 02/03/18  0630 02/02/18  0629   SODIUM mmol/L 144 142   POTASSIUM mmol/L 4 1 3 0*   CHLORIDE mmol/L 115* 111*   CO2 mmol/L 22 23   BUN mg/dL 17 21   CREATININE mg/dL 1 40* 1 52*   CALCIUM mg/dL 8 4 9 5   TOTAL PROTEIN g/dL  --  9 8*   BILIRUBIN TOTAL mg/dL  --  0 35   ALK PHOS U/L  --  125*   ALT U/L  --  22   AST U/L  --  14   GLUCOSE RANDOM mg/dL 88 74       Results from last 7 days  Lab Units 02/02/18  0629   INR  1 19*       * I Have Reviewed All Lab Data Listed Above  * Additional Pertinent Lab Tests Reviewed:  [address] Admission Reviewed    Imaging:    Imaging Reports Reviewed Today Include:    Imaging Personally Reviewed by Myself Includes:       Recent Cultures (last 7 days):       Results from last 7 days  Lab Units 02/02/18  0819   URINE CULTURE  >100,000 cfu/ml Gram Negative Jerald Enteric Like*       Last 24 Hours Medication List:     Current Facility-Administered Medications:  acetaminophen 650 mg Oral Q6H PRN Chris Cuellar MD    amLODIPine 10 mg Oral Daily Chris Cuellar MD    ARIPiprazole 30 mg Oral Daily Chris Cuellar MD    aspirin 81 mg Oral Daily Mere Álvarez MD    busPIRone 5 mg Oral BID Mere Álvarez MD    cholecalciferol 1,000 Units Oral Daily Mere Álvarez MD    ciprofloxacin 500 mg Oral Q12H [facility] Mere Ál[facility] MD    cloNIDine 0 3 mg Oral Q8H Criselda German MD    doxazosin 1 mg Oral HS Mere Álvarez MD    DULoxetine 60 mg Oral BID Mere Álvarez MD    folic acid 5,154 mcg Oral Daily Mere Álvarez MD    furosemide 20 mg Oral Daily Mere Álvarez MD    heparin (porcine) 5,000 Units Subcutaneous Q8H [address] Rosa Alaniz MD    hydrALAZINE 5 mg Intravenous Q6H PRN Virginia Morales PA-C    insulin glargine 10 Units Subcutaneous HS Mere Álvarez MD    insulin lispro 1-5 Units Subcutaneous TID Tracey Luna MD    insulin lispro 1-5 Units Subcutaneous HS Mere Álvarez MD    levothyroxine 100 mcg Oral Early Morning Lucky Mortimer Psaila, MD    LORazepam 2 mg Oral TID PRN Mere Álvarez MD    metoclopramide 10 mg Intravenous Q6H PRN Mere Álvarez MD    metoprolol tartrate 50 mg Oral BID Lucky Mortimer Psaila, MD    ondansetron 4 mg Intravenous Q6H PRN Mere Álvarez MD    pravastatin 80 mg Oral Daily Mere Álvarez MD    predniSONE 5 mg Oral Daily Lucky Mortimer Psaila, MD    rOPINIRole 0 25 mg Oral BID Mere Álvarez MD    sertraline 200 mg Oral Daily Mere Álvarez MD    simethicone 80 mg Oral 4x Daily PRN Mere Álvarez MD    sodium bicarbonate 1,300 mg Oral BID Lucky Mortimer Psaila, MD    sodium chloride 0 9 % with KCl 20 mEq/L 100 mL/hr Intravenous Continuous Mere Álvarez MD Last Rate: 100 mL/hr (02/03/18 0922)   tacrolimus 3 mg Oral HS Mere Álvarez MD    tacrolimus 4 mg Oral Daily Mere Álvarez MD         Today, Patient Was Seen By: Milad Kaur MD    ** Please Note: Dictation voice to text software may have been used in the creation of this document   **

## 2018-02-03 NOTE — ASSESSMENT & PLAN NOTE
· UTI present on admission  · Minimal symptoms were given immunosuppressive therapy will treat with antibiotics  · Monitor in and output  · Follow cultures

## 2018-02-03 NOTE — ASSESSMENT & PLAN NOTE
· Creatinine better than recent baseline, improved  · Gentle IV fluid  · Repeat in a m  · Will hold Lasix tonight consider reinstitution in a m

## 2018-02-03 NOTE — CASE MANAGEMENT
Initial Clinical Review    Admission: Date/Time/Statement:  OBS 2/2 @ 1022    Orders Placed This Encounter   Procedures    Place in Observation (expected length of stay for this patient is less than two midnights)     Standing Status:   Standing     Number of Occurrences:   1     Order Specific Question:   Admitting Physician     Answer:   Joycelyn Bower [[id number]]     Order Specific Question:   Level of Care     Answer:   Med Surg [16]       ED: Date/Time/Mode of Arrival:   ED Arrival Information     Expected Arrival Acuity Means of Arrival Escorted By Service Admission Type    - 2/2/2018 05:27 Emergent Walk-In Self General Medicine Emergency    Arrival Complaint    254 blood sugar          Chief Complaint:   Chief Complaint   Patient presents with    Abdominal Pain     Pt states she has abdominal pain, heart burn, weakness, and has been dry heaving for 2 days  Pt states she has transplanted kidney and pancreas and her pancreas just stated to fail  Pt states her BS has been out of control for the first time since the implanted pancreas was put in  History of Illness: 24-year-old female with history of renal transplant, pancreatic transplant in the 90s, type 1 diabetes and hypertension hyperlipidemia presents for evaluation of abdominal pain  Symptoms present for the past 24 hours  Localized to the lower abdomen  Cramping discomfort  Associated diarrhea as well as nausea but without vomiting  No chest pain or shortness of breath  No diaphoresis, fevers, chills  Sick contacts include family members with similar symptoms of diarrhea and nausea  No evidence of blood no melanotic stools  Normal urination  Decreased p o  intake secondary to nausea  No recent medication changes  States she did not eat very much food today but took her normal insulin regimen    Her glucose was 250 so she gave herself 10 units but did not eat anything prior to arrival   She states she did not take her blood pressure medications or most of her maintenance medications secondary to how she fell today  Bowel sounds are increased  There is no hepatosplenomegaly  There is tenderness in the right lower quadrant, suprapubic area and left lower quadrant  There is no rigidity, no rebound, no guarding and no CVA tenderness       ED Vital Signs:   ED Triage Vitals   Temperature Pulse Respirations Blood Pressure SpO2   02/02/18 0539 02/02/18 0539 02/02/18 0539 02/02/18 0539 02/02/18 0539   97 9 °F (36 6 °C) 103 16 (!) 204/86 100 %      Temp Source Heart Rate Source Patient Position - Orthostatic VS BP Location FiO2 (%)   02/02/18 0539 02/02/18 0734 02/02/18 0734 02/02/18 0734 --   Oral Monitor Lying Left arm       Pain Score       02/02/18 0700       No Pain        Wt Readings from Last 1 Encounters:   02/02/18 99 8 kg (220 lb)       Vital Signs (abnormal):   02/02/18 1115 -- 96 18  193/82 97 % -- AL   02/02/18 1030 -- 96  29 -- 96 % -- AL   02/02/18 1003 -- 92 18  174/70 96 % -- SR   02/02/18 0915 -- -- 18  201/85 98 % -- AL   02/02/18 0845 -- 92 16  173/73 99 % -- AL   02/02/18 0734 -- 88 18  213/87 98 % -- AL   02/02/18 0715 -- 86 17  208/114 99 % -- AL   02/02/18 0700 -- 103 18  230/96 98 % -- JLZ         Abnormal Labs/Diagnostic Test Results:   Lab Status:  Final result Specimen:  Blood from Arm, Right Updated:  02/02/18 0719        Sodium 142 mmol/L         Potassium 3 0 (L) mmol/L         Chloride 111 (H) mmol/L         CO2 23 mmol/L         Anion Gap 8 mmol/L         BUN 21 mg/dL         Creatinine 1 52 (H) mg/dL         Glucose 74 mg/dL         Calcium 9 5 mg/dL         AST 14 U/L         ALT 22 U/L         Alkaline Phosphatase 125 (H) U/L         Total Protein 9 8 (H) g/dL         Albumin 3 0 (L) g/dL         Total Bilirubin 0 35 mg/dL         eGFR 39 ml/min/1 73sq m       Narrative:        WBC 13 24 (H) Thousand/uL           RBC 3 83 Million/uL         Hemoglobin 10 2 (L) g/dL         Hematocrit 32 5 (L) %         MCV 85 fL       MCH 26 6 (L) pg         MCHC 31 4 g/dL         RDW 18 1 (H) %         MPV 11 5 fL         Platelets 900 Thousands/uL         nRBC 0 /100 WBCs         Neutrophils Relative 84 (H) %         Lymphocytes Relative 10 (L) %         Monocytes Relative 6 %         Eosinophils Relative 0 %         Basophils Relative 0 %         Neutrophils Absolute 10 90 (H) Thousands/      INR 1 19  Urine:  Large leukocytes,  + nitrites,  >= 300 protein,   Mod blood;   CX pending  CT A&P: Stable exam   No acute pathology   Status post renal and pancreatic transplants  CXR:      No acute findings   Mild cardiomegaly      EKG: Normal sinus rhythm      ED Treatment:   Medication Administration from 02/02/2018 0527 to 02/02/2018 1208       Date/Time Order Dose Route Action Action by Comments     02/02/2018 0816 sodium chloride 0 9 % bolus 1,000 mL 0 mL Intravenous Stopped Violeta Ordonez RN      02/02/2018 4952 sodium chloride 0 9 % bolus 1,000 mL 1,000 mL Intravenous [address] Sherren Fusi, RN      02/02/2018 0629 ondansetron (ZOFRAN) injection 4 mg 4 mg Intravenous Given Sherren Fusi, RN      02/02/2018 0630 dextrose 50 % IV solution 25 mL 25 mL Intravenous Given Sherren Fusi, RN      02/02/2018 0706 labetalol (NORMODYNE) injection 10 mg 10 mg Intravenous Given Sherren Fusi, RN      02/02/2018 0731 ondansetron (ZOFRAN) injection 4 mg 4 mg Intravenous Given Violeta Ordonez RN      02/02/2018 0857 acetaminophen (TYLENOL) tablet 975 mg 975 mg Oral Not Given Violeta Ordonez RN           Past Medical/Surgical History:    Active Ambulatory Problems     Diagnosis Date Noted    Renal transplant recipient 04/15/2016    [facility] 04/15/2016    Acute cystitis without hematuria 04/18/2016    Acquired hypothyroidism 04/29/2016    Benign hypertension with CKD (chronic kidney disease) stage IV ([facility] ) 04/29/2016    Hypokalemia 09/17/2016    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, without long-term current use of insulin ([facility])     Essential hypertension     Anemia     Status post simultaneous kidney and pancreas transplant ([facility])     Immunosuppression ([facility]) 02/09/2017    Chronic diastolic congestive heart failure ([facility]) 09/18/2017    Urinary incontinence 10/30/2017    Calculus, bladder, diverticulum 10/30/2017    Stage 3 chronic kidney disease 11/16/2017    Heartburn 11/16/2017    Hypercalcemia 11/16/2017    MGUS (monoclonal gammopathy of unknown significance) 11/16/2017    Chronic constipation 11/18/2017    Acid reflux disease 09/10/2012    Atrial fibrillation ([facility]) 04/03/2013    Benign hypertensive CKD 07/18/2013    Kaiser esophagus 07/08/2013    Cataract 12/23/2015    Cocaine abuse in remission 08/26/2015    Gastric and duodenal disease 04/03/2013    Diabetic nephropathy ([facility]) 01/23/2018    Diabetic polyneuropathy ([facility]) 04/03/2013    Diabetic retinopathy ([facility]) 08/47/1287    Diastolic dysfunction 44/94/1424    Essential and other specified forms of tremor 12/14/2013    Failure of pancreas transplant 08/03/2017    FELIPE (generalized anxiety disorder) 08/26/2015    Hyperlipidemia 09/10/2012    Indolent multiple myeloma ([facility]) 10/02/2017    Irritable bowel syndrome 05/02/2013    Major depressive disorder, recurrent episode, moderate ([facility]) 03/02/2017    Nonrheumatic aortic valve insufficiency 10/07/2016    OAB (overactive bladder) 08/30/2016    Osteoporosis 04/03/2013    Other insomnia 10/25/2017    Peripheral vascular disease ([facility]) 04/03/2013    Post-traumatic stress disorder, chronic 10/25/2017    Recurrent UTI 07/29/2016    Restless legs syndrome 07/23/2013    Secondary hyperparathyroidism, renal ([facility]) 07/18/2013     Resolved Ambulatory Problems     Diagnosis Date Noted    Hyperglycemia 04/15/2016    ARF (acute renal failure) ([facility]) 04/15/2016    UTI (urinary tract infection) 04/29/2016    Confusion 07/12/2016    Dysuria 08/01/2016    Toxic metabolic encephalopathy 07/35/3871    [facility] 09/13/2016    UTI (urinary tract infection) 09/13/2016    Low bicarbonate level 02/09/2017    Abdominal pain 06/14/2017    Pyelonephritis 06/14/2017    Weakness 06/14/2017    Nausea and vomiting 11/16/2017    Epigastric pain 11/16/2017    Bilateral carotid bruits 06/02/2017    ROD (dyspnea on exertion) 12/23/2015    Cognitive changes 10/29/2014     Past Medical History:   Diagnosis Date    Anemia     Cancer ([place] [address] )     Chronic diastolic (congestive) heart failure 9/18/2017    Diabetes mellitus ([place] [address] )     Disease of thyroid gland     History of transfusion     Hypertension     Night blindness     Psychiatric disorder     Renal disorder     Retinopathy     Status post simultaneous kidney and pancreas transplant ([place] [address] )     Toe amputation status ([place] [address] )        Admitting Diagnosis: Diarrhea [R19 7]  Hypokalemia [E87 6]  Abdominal pain [R10 9]  Intractable nausea and vomiting [R11 2]    Age/Sex: 47 y o  female    Assessment/Plan:   Gastroenteritis, acute   Assessment & Plan     · Clear liquid diet for now on  · Supportive care with IV fluids  · Hopefully advanced diet in a m  and discharged home tomorrow          Type 2 diabetes mellitus with renal complication (HCC)   Assessment & Plan     · Will give less of her usual insulin given poor p o  intake  · Patient to be on clear liquids  · Watch sugars with fingersticks and sliding scale coverage          Acute cystitis without hematuria   Assessment & Plan     · UTI present on admission  · Minimal symptoms were given immunosuppressive therapy will treat with antibiotics          Stage 3 chronic kidney disease   Assessment & Plan     · Creatinine better than recent baseline  · Gentle IV fluid  · Repeat in a m    · Will hold Lasix tonight consider reinstitution in a m           Status post simultaneous kidney and pancreas transplant [facility])   Assessment & Plan     · Continue new suppressive therapy for kidney pancreas transplant          Hypokalemia   Assessment & Plan     · Replete   · check magnesium in a m              VTE Prophylaxis: Heparin  / reason for no mechanical VTE prophylaxis Not indicated   Code Status:  Full code  POLST: POLST form is not discussed and not completed at this time  Anticipated Length of Stay:  Patient will be admitted on an Observation basis with an anticipated length of stay of  less than 2 midnights     Justification for Hospital Stay:  Patient is a pancreas kidney transplant patient with intractable nausea vomiting requiring IV fluid     Admission Orders:  Scheduled Meds:   Current Facility-Administered Medications:  acetaminophen 650 mg Oral Q6H PRN Jovanni Ahmadi MD    amLODIPine 10 mg Oral Daily Jovanni Ahmadi MD    ARIPiprazole 30 mg Oral Daily Jovanni Ahmadi MD    aspirin 81 mg Oral Daily Jovanni Ahmadi MD    busPIRone 5 mg Oral BID Jovanni Ahmadi MD    cholecalciferol 1,000 Units Oral Daily Jovanni Ahmadi MD    ciprofloxacin 500 mg Oral Q12H Daja Hester MD    cloNIDine 0 3 mg Oral Q8H [address] Jovanni Ahmadi MD    doxazosin 1 mg Oral HS Jovanni Ahmadi MD    DULoxetine 60 mg Oral BID Jovanni Ahmadi MD    folic acid 6,651 mcg Oral Daily Jovanni Ahmadi MD    furosemide 20 mg Oral Daily Jovanni Ahmadi MD    heparin (porcine) 5,000 Units Subcutaneous Q8H [address] Narciso Garcia MD    hydrALAZINE 5 mg Intravenous Q6H PRN Gerardo Mccall PA-C    insulin glargine 10 Units Subcutaneous HS Jovanni Ahmadi MD    insulin lispro 1-5 Units Subcutaneous TID Jj Luna MD    insulin lispro 1-5 Units Subcutaneous HS Jovanni Ahmadi MD    levothyroxine 100 mcg Oral Early Morning Julio Luna MD    LORazepam 2 mg Oral TID PRN Jovanni Ahmadi MD    metoclopramide 10 mg Intravenous Q6H PRN Jovanni Ahmadi MD    metoprolol tartrate 50 mg Oral BID Swathi Luna MD    ondansetron 4 mg Intravenous Q6H PRN Jovanni Ahmadi MD    pravastatin 80 mg Oral Daily New Sharp MD    predniSONE 5 mg Oral Daily New Sharp MD    rOPINIRole 0 25 mg Oral BID New Sharp MD    sertraline 200 mg Oral Daily New Sharp MD    simethicone 80 mg Oral 4x Daily PRN New Sharp MD    sodium bicarbonate 1,300 mg Oral BID New Sharp MD    sodium chloride 0 9 % with KCl 20 mEq/L 100 mL/hr Intravenous Continuous New Sharp MD Last Rate: 100 mL/hr (02/03/18 0922)   tacrolimus 3 mg Oral HS New Sharp MD    tacrolimus 4 mg Oral Daily New Sharp MD      Continuous Infusions:   sodium chloride 0 9 % with KCl 20 mEq/L 100 mL/hr Last Rate: 100 mL/hr (02/03/18 0922)     PRN Meds:   Acetaminophen x 1    hydrALAZINE IV X 1    LORazepam po x 1    Metoclopramide IV x 1    Ondansetron IV X 1    Simethicone x 1    OBS  Clear Liquids    2/3:  98 - 72 - 18   190/60  Cl 115,   Cr 1 40

## 2018-02-03 NOTE — PROGRESS NOTES
BP elevated, systolic ~631  SLIM notified  5mg hydrazaline PRN ordered  5mg administered, BP rechecked and is now 155/70

## 2018-02-04 VITALS
DIASTOLIC BLOOD PRESSURE: 78 MMHG | WEIGHT: 220 LBS | RESPIRATION RATE: 18 BRPM | HEIGHT: 67 IN | HEART RATE: 78 BPM | BODY MASS INDEX: 34.53 KG/M2 | SYSTOLIC BLOOD PRESSURE: 170 MMHG | TEMPERATURE: 97.9 F | OXYGEN SATURATION: 94 %

## 2018-02-04 PROBLEM — F32.A DEPRESSION: Status: ACTIVE | Noted: 2018-02-04

## 2018-02-04 PROBLEM — F41.9 ANXIETY: Status: ACTIVE | Noted: 2018-02-04

## 2018-02-04 LAB
BACTERIA UR CULT: ABNORMAL
GLUCOSE SERPL-MCNC: 76 MG/DL (ref 65–140)

## 2018-02-04 PROCEDURE — 82948 REAGENT STRIP/BLOOD GLUCOSE: CPT

## 2018-02-04 PROCEDURE — 99217 PR OBSERVATION CARE DISCHARGE MANAGEMENT: CPT | Performed by: INTERNAL MEDICINE

## 2018-02-04 RX ORDER — CEFDINIR 300 MG/1
300 CAPSULE ORAL EVERY 12 HOURS SCHEDULED
Qty: 14 CAPSULE | Refills: 0 | Status: SHIPPED | OUTPATIENT
Start: 2018-02-04 | End: 2018-02-11

## 2018-02-04 RX ORDER — SIMETHICONE 80 MG
80 TABLET,CHEWABLE ORAL 4 TIMES DAILY PRN
Qty: 30 TABLET | Refills: 0 | Status: SHIPPED | OUTPATIENT
Start: 2018-02-04 | End: 2018-07-23

## 2018-02-04 RX ORDER — LORAZEPAM 2 MG/1
2 TABLET ORAL 3 TIMES DAILY PRN
Qty: 30 TABLET | Refills: 0 | Status: SHIPPED | OUTPATIENT
Start: 2018-02-04 | End: 2018-06-26 | Stop reason: SDUPTHER

## 2018-02-04 RX ORDER — CEFDINIR 300 MG/1
300 CAPSULE ORAL EVERY 12 HOURS SCHEDULED
Status: DISCONTINUED | OUTPATIENT
Start: 2018-02-04 | End: 2018-02-04 | Stop reason: HOSPADM

## 2018-02-04 RX ORDER — CIPROFLOXACIN 500 MG/1
500 TABLET, FILM COATED ORAL EVERY 12 HOURS SCHEDULED
Qty: 10 TABLET | Refills: 0 | Status: SHIPPED | OUTPATIENT
Start: 2018-02-04 | End: 2018-02-04 | Stop reason: HOSPADM

## 2018-02-04 RX ADMIN — LEVOTHYROXINE SODIUM 100 MCG: 100 TABLET ORAL at 05:48

## 2018-02-04 RX ADMIN — BUSPIRONE HYDROCHLORIDE 5 MG: 5 TABLET ORAL at 08:44

## 2018-02-04 RX ADMIN — PREDNISONE 5 MG: 5 TABLET ORAL at 08:44

## 2018-02-04 RX ADMIN — CIPROFLOXACIN HYDROCHLORIDE 500 MG: 500 TABLET, FILM COATED ORAL at 08:45

## 2018-02-04 RX ADMIN — FOLIC ACID 1000 MCG: 1 TABLET ORAL at 08:45

## 2018-02-04 RX ADMIN — PRAVASTATIN SODIUM 80 MG: 80 TABLET ORAL at 08:45

## 2018-02-04 RX ADMIN — ROPINIROLE 0.25 MG: 0.25 TABLET, FILM COATED ORAL at 08:44

## 2018-02-04 RX ADMIN — METOPROLOL TARTRATE 50 MG: 50 TABLET ORAL at 07:40

## 2018-02-04 RX ADMIN — ARIPIPRAZOLE 30 MG: 15 TABLET ORAL at 08:44

## 2018-02-04 RX ADMIN — CLONIDINE HYDROCHLORIDE 0.3 MG: 0.1 TABLET ORAL at 05:48

## 2018-02-04 RX ADMIN — VITAMIN D, TAB 1000IU (100/BT) 1000 UNITS: 25 TAB at 08:45

## 2018-02-04 RX ADMIN — SODIUM BICARBONATE 650 MG TABLET 1300 MG: at 08:45

## 2018-02-04 RX ADMIN — SERTRALINE HYDROCHLORIDE 200 MG: 100 TABLET ORAL at 08:45

## 2018-02-04 RX ADMIN — ASPIRIN 81 MG 81 MG: 81 TABLET ORAL at 08:45

## 2018-02-04 RX ADMIN — DULOXETINE HYDROCHLORIDE 60 MG: 60 CAPSULE, DELAYED RELEASE ORAL at 08:45

## 2018-02-04 RX ADMIN — FUROSEMIDE 20 MG: 20 TABLET ORAL at 07:40

## 2018-02-04 RX ADMIN — LORAZEPAM 2 MG: 1 TABLET ORAL at 08:45

## 2018-02-04 RX ADMIN — HEPARIN SODIUM 5000 UNITS: 5000 INJECTION, SOLUTION INTRAVENOUS; SUBCUTANEOUS at 05:48

## 2018-02-04 RX ADMIN — AMLODIPINE BESYLATE 10 MG: 10 TABLET ORAL at 07:40

## 2018-02-04 RX ADMIN — TACROLIMUS 4 MG: 1 CAPSULE ORAL at 08:45

## 2018-02-04 NOTE — ASSESSMENT & PLAN NOTE
Patient has many stressors in life  Her mother was recently diagnosed with nasal cancer  She is in 'bad shape', her pet dog was put to sleep last two days ago because it was not doing well  She was placed on ativan PO  She is on abilify as well

## 2018-02-05 ENCOUNTER — LAB (OUTPATIENT)
Dept: LAB | Age: 53
End: 2018-02-05
Payer: MEDICARE

## 2018-02-05 ENCOUNTER — TRANSCRIBE ORDERS (OUTPATIENT)
Dept: ADMINISTRATIVE | Age: 53
End: 2018-02-05

## 2018-02-05 ENCOUNTER — TELEPHONE (OUTPATIENT)
Dept: FAMILY MEDICINE CLINIC | Facility: CLINIC | Age: 53
End: 2018-02-05

## 2018-02-05 DIAGNOSIS — Z94.0 HISTORY OF KIDNEY TRANSPLANT: ICD-10-CM

## 2018-02-05 DIAGNOSIS — N18.4 CHRONIC KIDNEY DISEASE, STAGE IV (SEVERE) (HCC): ICD-10-CM

## 2018-02-05 DIAGNOSIS — E78.5 HYPERLIPIDEMIA: ICD-10-CM

## 2018-02-05 DIAGNOSIS — E03.9 HYPOTHYROIDISM: ICD-10-CM

## 2018-02-05 DIAGNOSIS — E11.21 TYPE 2 DIABETES MELLITUS WITH DIABETIC NEPHROPATHY (HCC): ICD-10-CM

## 2018-02-05 DIAGNOSIS — D64.9 ANEMIA, UNSPECIFIED TYPE: ICD-10-CM

## 2018-02-05 DIAGNOSIS — D64.9 ANEMIA, UNSPECIFIED TYPE: Primary | ICD-10-CM

## 2018-02-05 LAB
ANION GAP SERPL CALCULATED.3IONS-SCNC: 9 MMOL/L (ref 4–13)
BACTERIA UR QL AUTO: ABNORMAL /HPF
BASOPHILS # BLD AUTO: 0.05 THOUSANDS/ΜL (ref 0–0.1)
BASOPHILS NFR BLD AUTO: 1 % (ref 0–1)
BILIRUB UR QL STRIP: NEGATIVE
BUN SERPL-MCNC: 25 MG/DL (ref 5–25)
CALCIUM SERPL-MCNC: 8.5 MG/DL (ref 8.3–10.1)
CHLORIDE SERPL-SCNC: 108 MMOL/L (ref 100–108)
CLARITY UR: ABNORMAL
CO2 SERPL-SCNC: 24 MMOL/L (ref 21–32)
COLOR UR: YELLOW
CREAT SERPL-MCNC: 1.63 MG/DL (ref 0.6–1.3)
CREAT UR-MCNC: 44.7 MG/DL
EOSINOPHIL # BLD AUTO: 0.25 THOUSAND/ΜL (ref 0–0.61)
EOSINOPHIL NFR BLD AUTO: 3 % (ref 0–6)
ERYTHROCYTE [DISTWIDTH] IN BLOOD BY AUTOMATED COUNT: 18.1 % (ref 11.6–15.1)
FERRITIN SERPL-MCNC: 19 NG/ML (ref 8–388)
GFR SERPL CREATININE-BSD FRML MDRD: 36 ML/MIN/1.73SQ M
GLUCOSE P FAST SERPL-MCNC: 97 MG/DL (ref 65–99)
GLUCOSE UR STRIP-MCNC: NEGATIVE MG/DL
HCT VFR BLD AUTO: 31.6 % (ref 34.8–46.1)
HGB BLD-MCNC: 9.5 G/DL (ref 11.5–15.4)
HGB UR QL STRIP.AUTO: ABNORMAL
HYALINE CASTS #/AREA URNS LPF: ABNORMAL /LPF
IRON SATN MFR SERPL: 14 %
IRON SERPL-MCNC: 40 UG/DL (ref 50–170)
KETONES UR STRIP-MCNC: NEGATIVE MG/DL
LEUKOCYTE ESTERASE UR QL STRIP: ABNORMAL
LYMPHOCYTES # BLD AUTO: 1.81 THOUSANDS/ΜL (ref 0.6–4.47)
LYMPHOCYTES NFR BLD AUTO: 21 % (ref 14–44)
MAGNESIUM SERPL-MCNC: 1.8 MG/DL (ref 1.6–2.6)
MCH RBC QN AUTO: 26 PG (ref 26.8–34.3)
MCHC RBC AUTO-ENTMCNC: 30.1 G/DL (ref 31.4–37.4)
MCV RBC AUTO: 87 FL (ref 82–98)
MONOCYTES # BLD AUTO: 0.77 THOUSAND/ΜL (ref 0.17–1.22)
MONOCYTES NFR BLD AUTO: 9 % (ref 4–12)
NEUTROPHILS # BLD AUTO: 5.62 THOUSANDS/ΜL (ref 1.85–7.62)
NEUTS SEG NFR BLD AUTO: 66 % (ref 43–75)
NITRITE UR QL STRIP: NEGATIVE
NON-SQ EPI CELLS URNS QL MICRO: ABNORMAL /HPF
NRBC BLD AUTO-RTO: 0 /100 WBCS
PH UR STRIP.AUTO: 7 [PH] (ref 4.5–8)
PHOSPHATE SERPL-MCNC: 4.4 MG/DL (ref 2.7–4.5)
PLATELET # BLD AUTO: 217 THOUSANDS/UL (ref 149–390)
PMV BLD AUTO: 11.1 FL (ref 8.9–12.7)
POTASSIUM SERPL-SCNC: 4.2 MMOL/L (ref 3.5–5.3)
PROT UR STRIP-MCNC: ABNORMAL MG/DL
PROT UR-MCNC: 135 MG/DL
PROT/CREAT UR: 3.02 MG/G{CREAT} (ref 0–0.1)
PTH-INTACT SERPL-MCNC: 143.1 PG/ML (ref 14–72)
RBC # BLD AUTO: 3.65 MILLION/UL (ref 3.81–5.12)
RBC #/AREA URNS AUTO: ABNORMAL /HPF
SODIUM SERPL-SCNC: 141 MMOL/L (ref 136–145)
SP GR UR STRIP.AUTO: 1.01 (ref 1–1.03)
T4 FREE SERPL-MCNC: 1.16 NG/DL (ref 0.76–1.46)
TACROLIMUS BLD-MCNC: 6 NG/ML (ref 2–20)
TIBC SERPL-MCNC: 293 UG/DL (ref 250–450)
TSH SERPL DL<=0.05 MIU/L-ACNC: 5.34 UIU/ML (ref 0.36–3.74)
UROBILINOGEN UR QL STRIP.AUTO: 0.2 E.U./DL
WBC # BLD AUTO: 8.56 THOUSAND/UL (ref 4.31–10.16)
WBC #/AREA URNS AUTO: ABNORMAL /HPF

## 2018-02-05 PROCEDURE — 83550 IRON BINDING TEST: CPT

## 2018-02-05 PROCEDURE — 84100 ASSAY OF PHOSPHORUS: CPT

## 2018-02-05 PROCEDURE — 84443 ASSAY THYROID STIM HORMONE: CPT

## 2018-02-05 PROCEDURE — 84156 ASSAY OF PROTEIN URINE: CPT

## 2018-02-05 PROCEDURE — 82570 ASSAY OF URINE CREATININE: CPT

## 2018-02-05 PROCEDURE — 85025 COMPLETE CBC W/AUTO DIFF WBC: CPT

## 2018-02-05 PROCEDURE — 83735 ASSAY OF MAGNESIUM: CPT

## 2018-02-05 PROCEDURE — 36415 COLL VENOUS BLD VENIPUNCTURE: CPT

## 2018-02-05 PROCEDURE — 83540 ASSAY OF IRON: CPT

## 2018-02-05 PROCEDURE — 81001 URINALYSIS AUTO W/SCOPE: CPT

## 2018-02-05 PROCEDURE — 80197 ASSAY OF TACROLIMUS: CPT

## 2018-02-05 PROCEDURE — 83970 ASSAY OF PARATHORMONE: CPT

## 2018-02-05 PROCEDURE — 82728 ASSAY OF FERRITIN: CPT

## 2018-02-05 PROCEDURE — 80048 BASIC METABOLIC PNL TOTAL CA: CPT

## 2018-02-05 PROCEDURE — 84439 ASSAY OF FREE THYROXINE: CPT

## 2018-02-05 NOTE — DISCHARGE SUMMARY
Discharge- Omar Jamar 1965, 47 y o  female MRN: [id number]    Unit/Bed#: [facility] 802-01 Encounter: [id number]    Primary Care Provider: Tessa Jama MD   Date and time admitted to hospital: 2/2/2018  5:48 AM        Depression   Assessment & Plan    She is depressed due to family issues  She will need         Anxiety   Assessment & Plan    Patient has many stressors in life  Her mother was recently diagnosed with nasal cancer  She is in 'bad shape', her pet dog was put to sleep last two days ago because it was not doing well  She was placed on ativan PO  She is on abilify as well  Type 2 diabetes mellitus with renal complication (HCC)   Assessment & Plan    · Will give less of her usual insulin given poor p o  intake  · Patient to be on clear liquids  · Watch sugars with fingersticks and sliding scale coverage        Stage 3 chronic kidney disease   Assessment & Plan    · Creatinine better than recent baseline, improved  · Gentle IV fluid  · Repeat in a m  · Will hold Lasix tonight consider reinstitution in a m  Status post simultaneous kidney and pancreas transplant [facility])   Assessment & Plan    · Continue new suppressive therapy for kidney pancreas transplant        Hypokalemia   Assessment & Plan    · Replete   · check magnesium in a m          Acute cystitis without hematuria   Assessment & Plan    · UTI present on admission  · Minimal symptoms were given immunosuppressive therapy will treat with antibiotics  · Monitor in and output  · Follow cultures        * Gastroenteritis, acute   Assessment & Plan    · Clear liquid diet for now on changed, patient is tolerating  · Supportive care with IV fluids                  Consultations During Hospital Stay:  · none    Procedures Performed:     · none    Significant Findings / Test Results:     · Hypertensive accelaration due to anxiety    Incidental Findings:   · none     Test Results Pending at Discharge (will require follow up): · none     Outpatient Tests Requested:  · BMP and blood pressure follow up    Complications:  none    Reason for Admission: elevated blood pressure    Hospital Course: Gerhardt Mascot is a 47 y o  female patient who originally presented to the hospital on 2/2/2018 due to nausea, poor po intake  She was started on clears  She has Urinary tract infection and cultures grew E  Coli resistant to Cipro she was on, she was placed on omnicef for total of 7 days  She needs to follow with renal doctors outpatient  Also she has high blood pressure very labile with her anxiety  She has a lot of stressors at home, she was following with psych doctors  They have recommended to start medications just a week ago  Her mother is diagnosed with cancer, dog passed away and she has other issues at home  Advised patient to control her stress with meditation and other means  She needs to measure her blood pressure daily twice when she is calm and record  Bring it to her primary doctor for more adjustment of medications  She agreed with plan  I have advised her to eat low sodium diet as well to control her blood pressure  Please see above list of diagnoses and related plan for additional information  Condition at Discharge: stable     Discharge Day Visit / Exam:     * Please refer to separate progress note for these details *    Discussion with Family: father at bedside when I have explained to her regarding medications and exercise     Discharge instructions/Information to patient and family:   See after visit summary for information provided to patient and family  Provisions for Follow-Up Care:  See after visit summary for information related to follow-up care and any pertinent home health orders        Disposition:     Home    For Discharges to [facility]:   · Not Applicable to this Patient - Not Applicable to this Patient    Planned Readmission: no     Discharge Statement:  I spent 45 minutes discharging the patient  This time was spent on the day of discharge  I had direct contact with the patient on the day of discharge  Greater than 50% of the total time was spent examining patient, answering all patient questions, arranging and discussing plan of care with patient as well as directly providing post-discharge instructions  Additional time then spent on discharge activities  Discharge Medications:  See after visit summary for reconciled discharge medications provided to patient and family        ** Please Note: This note has been constructed using a voice recognition system **

## 2018-02-05 NOTE — TELEPHONE ENCOUNTER
----- Message from Sana Marie MD sent at 2/5/2018  1:11 PM EST -----  Please call patient  TSH level is borderline high  T4 free - within normal range  Recommend to continue Levothyroxine 88 mcg daily

## 2018-02-07 ENCOUNTER — TELEPHONE (OUTPATIENT)
Dept: NEPHROLOGY | Facility: CLINIC | Age: 53
End: 2018-02-07

## 2018-02-07 DIAGNOSIS — R80.1 PERSISTENT PROTEINURIA: Primary | ICD-10-CM

## 2018-02-07 DIAGNOSIS — Z94.0 RENAL TRANSPLANT RECIPIENT: ICD-10-CM

## 2018-02-07 DIAGNOSIS — D50.8 OTHER IRON DEFICIENCY ANEMIA: ICD-10-CM

## 2018-02-07 DIAGNOSIS — Z94.0 HISTORY OF KIDNEY TRANSPLANT: ICD-10-CM

## 2018-02-07 RX ORDER — FERROUS SULFATE TAB EC 324 MG (65 MG FE EQUIVALENT) 324 (65 FE) MG
324 TABLET DELAYED RESPONSE ORAL
Qty: 30 TABLET | Refills: 3 | Status: SHIPPED | OUTPATIENT
Start: 2018-02-07 | End: 2018-05-21

## 2018-02-07 NOTE — TELEPHONE ENCOUNTER
Reviewed with patient labwork over the phone  Also, patient was recently admitted for abdominal pain, hyperglycemia, hypertension  Clinically improved with conservative management and control of blood sugars  Received IV fluids  Also the patient was treated for a urinary tract infection  Patient stated that did not have symptoms but did have malodorous urine  Currently the patient feels back to baseline  Blood pressure is 119 systolic  Tac 6, slightly above goal  Urine diff to interpret  UPCR elevated, diff to interpret as being treated for UTI  But, given history of smoldering myeloma, check SPEP, FLC also  Repeat labs in 2 weeks  Pt in agreement  Plan:    - check BMP, CBC, Tacrolimus, UPCR, SPEP, FLC in 2 weeks  - pt aware of plan     - pt next appt with me is in April; will see if can be moved to march  - start oral iron tab once daily - sent to pharmacy  - pt knows to take colace prn for constipation

## 2018-02-16 ENCOUNTER — OFFICE VISIT (OUTPATIENT)
Dept: FAMILY MEDICINE CLINIC | Facility: CLINIC | Age: 53
End: 2018-02-16
Payer: MEDICARE

## 2018-02-16 VITALS
SYSTOLIC BLOOD PRESSURE: 154 MMHG | HEIGHT: 67 IN | BODY MASS INDEX: 35.79 KG/M2 | HEART RATE: 71 BPM | RESPIRATION RATE: 16 BRPM | OXYGEN SATURATION: 95 % | TEMPERATURE: 97.9 F | DIASTOLIC BLOOD PRESSURE: 64 MMHG | WEIGHT: 228 LBS

## 2018-02-16 DIAGNOSIS — N39.0 RECURRENT UTI: ICD-10-CM

## 2018-02-16 DIAGNOSIS — N18.4 CHRONIC KIDNEY DISEASE, STAGE 4 (SEVERE) (HCC): ICD-10-CM

## 2018-02-16 DIAGNOSIS — Z94.0 RENAL TRANSPLANT RECIPIENT: ICD-10-CM

## 2018-02-16 DIAGNOSIS — S16.1XXA STRAIN OF NECK MUSCLE, INITIAL ENCOUNTER: ICD-10-CM

## 2018-02-16 DIAGNOSIS — E78.2 MIXED HYPERLIPIDEMIA: ICD-10-CM

## 2018-02-16 DIAGNOSIS — E10.42 CONTROLLED TYPE 1 DIABETES MELLITUS WITH DIABETIC POLYNEUROPATHY (HCC): ICD-10-CM

## 2018-02-16 DIAGNOSIS — Z94.0 STATUS POST KIDNEY TRANSPLANT: ICD-10-CM

## 2018-02-16 DIAGNOSIS — E03.9 ACQUIRED HYPOTHYROIDISM: ICD-10-CM

## 2018-02-16 DIAGNOSIS — I10 ESSENTIAL HYPERTENSION: Primary | ICD-10-CM

## 2018-02-16 DIAGNOSIS — C90.00 INDOLENT MULTIPLE MYELOMA (HCC): ICD-10-CM

## 2018-02-16 PROBLEM — R12 HEARTBURN: Status: RESOLVED | Noted: 2017-11-16 | Resolved: 2018-02-16

## 2018-02-16 PROBLEM — K52.9 GASTROENTERITIS, ACUTE: Status: RESOLVED | Noted: 2018-02-02 | Resolved: 2018-02-16

## 2018-02-16 PROBLEM — E11.29 TYPE 2 DIABETES MELLITUS WITH RENAL COMPLICATION (HCC): Status: RESOLVED | Noted: 2018-02-02 | Resolved: 2018-02-16

## 2018-02-16 LAB
BACTERIA UR QL AUTO: ABNORMAL /HPF
BILIRUB UR QL STRIP: NEGATIVE
CLARITY UR: ABNORMAL
COLOR UR: YELLOW
GLUCOSE UR STRIP-MCNC: NEGATIVE MG/DL
HGB UR QL STRIP.AUTO: NEGATIVE
HYALINE CASTS #/AREA URNS LPF: ABNORMAL /LPF
KETONES UR STRIP-MCNC: NEGATIVE MG/DL
LEUKOCYTE ESTERASE UR QL STRIP: ABNORMAL
NITRITE UR QL STRIP: POSITIVE
NON-SQ EPI CELLS URNS QL MICRO: ABNORMAL /HPF
PH UR STRIP.AUTO: 6.5 [PH] (ref 4.5–8)
PROT UR STRIP-MCNC: ABNORMAL MG/DL
RBC #/AREA URNS AUTO: ABNORMAL /HPF
SL AMB  POCT GLUCOSE, UA: ABNORMAL
SL AMB LEUKOCYTE ESTERASE,UA: ABNORMAL
SL AMB POCT BILIRUBIN,UA: ABNORMAL
SL AMB POCT BLOOD,UA: ABNORMAL
SL AMB POCT CLARITY,UA: CLEAR
SL AMB POCT COLOR,UA: YELLOW
SL AMB POCT KETONES,UA: ABNORMAL
SL AMB POCT NITRITE,UA: ABNORMAL
SL AMB POCT PH,UA: 6
SL AMB POCT SPECIFIC GRAVITY,UA: 1.01
SL AMB POCT URINE PROTEIN: ABNORMAL
SL AMB POCT UROBILINOGEN: ABNORMAL
SP GR UR STRIP.AUTO: 1.01 (ref 1–1.03)
UROBILINOGEN UR QL STRIP.AUTO: 0.2 E.U./DL
WBC #/AREA URNS AUTO: ABNORMAL /HPF

## 2018-02-16 PROCEDURE — 99215 OFFICE O/P EST HI 40 MIN: CPT | Performed by: FAMILY MEDICINE

## 2018-02-16 PROCEDURE — 81002 URINALYSIS NONAUTO W/O SCOPE: CPT | Performed by: FAMILY MEDICINE

## 2018-02-16 PROCEDURE — 81001 URINALYSIS AUTO W/SCOPE: CPT | Performed by: FAMILY MEDICINE

## 2018-02-16 RX ORDER — METHOCARBAMOL 500 MG/1
TABLET, FILM COATED ORAL
Qty: 21 TABLET | Refills: 0 | Status: SHIPPED | OUTPATIENT
Start: 2018-02-16 | End: 2018-07-23

## 2018-02-16 RX ORDER — CEPHALEXIN 500 MG/1
CAPSULE ORAL
Qty: 21 CAPSULE | Refills: 0 | Status: SHIPPED | OUTPATIENT
Start: 2018-02-16 | End: 2018-02-22

## 2018-02-16 RX ORDER — LEVOTHYROXINE SODIUM 0.1 MG/1
100 TABLET ORAL DAILY
Qty: 30 TABLET | Refills: 5 | Status: SHIPPED | OUTPATIENT
Start: 2018-02-16 | End: 2018-03-13 | Stop reason: SDUPTHER

## 2018-02-16 NOTE — PROGRESS NOTES
Assessment/Plan:    1  HTN - stable  Continue current BP medications  Monitor BP at home  Follow a low sodium diet  2 Hypothyroidism - will increase dose of Levothyroxine to 100 mcg daily  Rechck TSH, T4 free in 6-8 weeks  3 CKD stage 3-4 / S/P kidney transplant - follow-up with nephrologist Dr Tessa Nj in 4/17  4 Hyperlipidemia - cont  Pravastatin 80 mg daily  Follow a low cholesterol, low fat diet  5 Recurrent UTI's -urine dipstick is positive for leukocytes  Will send urine for U/A and urine culture  Start Cephalexin 500 mg one  capsule 3 times daily for 1 week  Recommended to stay well hydrated  Call office or go to the emergency room symptoms persist or worsen  6 Indolent multiple myeloma- followup with hematology -oncology Fr Jessa Duval  7 Neck strain -take Methocarbamol one  tablet every 8 hr  as needed for neck pain/neck spasms  Avoid strenuous activity  Call office if symptoms persist      8 Type 1 DM with neuropathy- continue current Insulin regimen  Follow with endocrinology Dr Leisa Power  Schedule follow-up visit in 5 months  Diagnoses and all orders for this visit:    Essential hypertension    Acquired hypothyroidism  -     levothyroxine 100 mcg tablet; Take 1 tablet (100 mcg total) by mouth daily  -     TSH, 3rd generation; Future  -     T4, free; Future    Chronic kidney disease, stage 4 (severe) (HCC)  -     UA (URINE) with reflex to Microscopic; Future  -     UA (URINE) with reflex to Microscopic  -     Urine Microscopic    Mixed hyperlipidemia  -     Lipid panel; Future    Renal transplant recipient  -     POCT urine dip  -     UA (URINE) with reflex to Microscopic; Future  -     UA (URINE) with reflex to Microscopic  -     Urine Microscopic    Status post kidney transplant  -     POCT urine dip  -     UA (URINE) with reflex to Microscopic;  Future  -     UA (URINE) with reflex to Microscopic  -     Urine Microscopic    Recurrent UTI  -     POCT urine dip  - cephalexin (KEFLEX) 500 mg capsule; Take 1 caps  3 times daily for 1 week  -     Cancel: Urinalysis with microscopic  -     Cancel: Urine culture; Future  -     UA (URINE) with reflex to Microscopic; Future  -     UA (URINE) with reflex to Microscopic  -     Urine Microscopic    Indolent multiple myeloma (HCC)    Strain of neck muscle, initial encounter  -     methocarbamol (ROBAXIN) 500 mg tablet; Take 1 tablet every 8 hr  PRN for neck spasms    Controlled type 1 diabetes mellitus with diabetic polyneuropathy [facility]          Subjective:      Patient ID: Migdalia Perry is a 47 y o  female  HPI     Patient is 47 y o  female with multiple medical problems: HTN, Hyperlipidemia,  AI, CKD stage 4, H/o kidney and pancreatic transplant in 1998, Hypothyroidism, Type 1 DM, diabetic neuropathy,  Anemia, Depression, Anxiety, indolent multiple myeloma  She presents for 4 month followup visit  Reviewed all current medications, specialists notes, blood work results from February 5, 2017  Potassium 4 2, creatinine 1 63, GFR 36, TSH 5 340, T4 free 1 16  Iron 40, Hb 9 5  Patient c/o feeling very tired, had episode of urinary incontinence yesterday  Denies fever, chills  She has h/o recurrent UTI's  Denies blood in urine  No burning with urination  Type 1 DM with diabetic neuropathy-  patient has been followed by endocrinologist Dr Georgiana Seth  She is currently on insulin-dependent therapy due to failing pancreatic transplant  She uses Toujeo 20 un  at bedtime, injects Humalog Insulin 5 un  before meals  Denies hypoglycemic episodes  She has been followed by ophthalmologist Dr Jose Guadalupe Price care as per podiatrist Dr Vincenzo Muñiz monthly  CKD stage 4 -patient has been followed by nephrologist Dr Danelle Barnett  Hyperlipidemia - currently taking  Pravastatin 80 mg daily  Denies side effects from statin therapy  Hypothyroidism - patient takes Levothyroxine 88 mcg daily       Anxiety / Depression - mood has been stable  Management per psychiatrist Dr Quang Villanueva  C/o R-sided neck pain for the last 5 days  Denies neck injury or falls  No tingling, numbness in arms  Took Tylenol with no significant relief  Patient has been followed by hematology -oncology Dr Noelle Webb for indolent multiple myeloma, last seen in 10/17  DEXA scan done in October 2015 showed osteopenia  Patient had colonoscopy in April 2013  Mammogram done in 4/17  Patient had Pneumovax  in 10/17  The following portions of the patient's history were reviewed and updated as appropriate: allergies, current medications, past family history, past medical history, past social history, past surgical history and problem list     Review of Systems   Constitutional: Positive for appetite change (decreased appetite) and fatigue  Negative for chills and fever  HENT: Negative for congestion, ear pain, nosebleeds, sore throat and trouble swallowing  Eyes: Negative for discharge, redness, itching and visual disturbance  Respiratory: Negative for cough, chest tightness, shortness of breath and wheezing  Cardiovascular: Negative for chest pain, palpitations and leg swelling  Gastrointestinal: Positive for constipation  Negative for abdominal pain, blood in stool, diarrhea, nausea and vomiting  Genitourinary: Positive for urgency  Negative for dysuria, hematuria, vaginal bleeding and vaginal discharge  Episode of urinary incontinence yesterday   Musculoskeletal: Positive for gait problem and neck pain  Patient ambulates with a cane   Hematological: Negative for adenopathy  Bruises/bleeds easily  Psychiatric/Behavioral: Negative for confusion and suicidal ideas  Anxiety, Depression - symptoms are stable  Objective:    Vitals:    02/16/18 0951   BP: 154/64   Pulse: 71   Resp: 16   Temp: 97 9 °F (36 6 °C)   SpO2: 95%        Physical Exam   Constitutional: She appears well-developed and well-nourished  No distress  Obese   HENT:   Head: Normocephalic and atraumatic  Right Ear: External ear normal    Left Ear: External ear normal    Mouth/Throat: Oropharynx is clear and moist    Eyes: Conjunctivae are normal  Pupils are equal, round, and reactive to light  Neck: No thyromegaly present  Cardiovascular: Normal rate, regular rhythm and normal heart sounds  No murmur heard  Pulmonary/Chest: Effort normal and breath sounds normal  She has no wheezes  She has no rales  Abdominal: Soft  Bowel sounds are normal  There is no tenderness  Musculoskeletal:   Neck : decreased ROM with flexion, extension, lateral rotation  Paraspinal tenderness in cervical area on R side  No spinal tenderness  R foot: amputated 1 st toe  L foot: amputated all 5 toes  Lymphadenopathy:     She has no cervical adenopathy  Nursing note and vitals reviewed

## 2018-02-27 ENCOUNTER — LAB (OUTPATIENT)
Dept: LAB | Age: 53
End: 2018-02-27
Payer: MEDICARE

## 2018-02-27 DIAGNOSIS — Z94.0 RENAL TRANSPLANT RECIPIENT: ICD-10-CM

## 2018-02-27 DIAGNOSIS — R80.1 PERSISTENT PROTEINURIA: ICD-10-CM

## 2018-02-27 LAB
ANION GAP SERPL CALCULATED.3IONS-SCNC: 8 MMOL/L (ref 4–13)
BASOPHILS # BLD AUTO: 0.07 THOUSANDS/ΜL (ref 0–0.1)
BASOPHILS NFR BLD AUTO: 1 % (ref 0–1)
BUN SERPL-MCNC: 31 MG/DL (ref 5–25)
CALCIUM SERPL-MCNC: 8.8 MG/DL (ref 8.3–10.1)
CHLORIDE SERPL-SCNC: 106 MMOL/L (ref 100–108)
CO2 SERPL-SCNC: 22 MMOL/L (ref 21–32)
CREAT SERPL-MCNC: 1.6 MG/DL (ref 0.6–1.3)
EOSINOPHIL # BLD AUTO: 0.36 THOUSAND/ΜL (ref 0–0.61)
EOSINOPHIL NFR BLD AUTO: 4 % (ref 0–6)
ERYTHROCYTE [DISTWIDTH] IN BLOOD BY AUTOMATED COUNT: 19.5 % (ref 11.6–15.1)
GFR SERPL CREATININE-BSD FRML MDRD: 37 ML/MIN/1.73SQ M
GLUCOSE P FAST SERPL-MCNC: 97 MG/DL (ref 65–99)
HCT VFR BLD AUTO: 28.2 % (ref 34.8–46.1)
HGB BLD-MCNC: 8.4 G/DL (ref 11.5–15.4)
LYMPHOCYTES # BLD AUTO: 1.13 THOUSANDS/ΜL (ref 0.6–4.47)
LYMPHOCYTES NFR BLD AUTO: 12 % (ref 14–44)
MCH RBC QN AUTO: 25.8 PG (ref 26.8–34.3)
MCHC RBC AUTO-ENTMCNC: 29.8 G/DL (ref 31.4–37.4)
MCV RBC AUTO: 87 FL (ref 82–98)
MONOCYTES # BLD AUTO: 0.51 THOUSAND/ΜL (ref 0.17–1.22)
MONOCYTES NFR BLD AUTO: 5 % (ref 4–12)
NEUTROPHILS # BLD AUTO: 7.71 THOUSANDS/ΜL (ref 1.85–7.62)
NEUTS SEG NFR BLD AUTO: 78 % (ref 43–75)
NRBC BLD AUTO-RTO: 0 /100 WBCS
PLATELET # BLD AUTO: 211 THOUSANDS/UL (ref 149–390)
PMV BLD AUTO: 11.1 FL (ref 8.9–12.7)
POTASSIUM SERPL-SCNC: 4.6 MMOL/L (ref 3.5–5.3)
RBC # BLD AUTO: 3.26 MILLION/UL (ref 3.81–5.12)
SODIUM SERPL-SCNC: 136 MMOL/L (ref 136–145)
WBC # BLD AUTO: 9.85 THOUSAND/UL (ref 4.31–10.16)

## 2018-02-27 PROCEDURE — 84165 PROTEIN E-PHORESIS SERUM: CPT

## 2018-02-27 PROCEDURE — 83883 ASSAY NEPHELOMETRY NOT SPEC: CPT

## 2018-02-27 PROCEDURE — 80048 BASIC METABOLIC PNL TOTAL CA: CPT

## 2018-02-27 PROCEDURE — 85025 COMPLETE CBC W/AUTO DIFF WBC: CPT

## 2018-02-27 PROCEDURE — 84165 PROTEIN E-PHORESIS SERUM: CPT | Performed by: PATHOLOGY

## 2018-02-27 PROCEDURE — 36415 COLL VENOUS BLD VENIPUNCTURE: CPT

## 2018-02-27 PROCEDURE — 80197 ASSAY OF TACROLIMUS: CPT

## 2018-02-27 NOTE — PROGRESS NOTES
Prelim labs reviewed  Cr relatively stable  Hb decreased  Iron labs with low iron sat  - await final labs  - but, likely that patient will require fec occ check and iron infusions  - will review with patient once all labs returned

## 2018-02-28 DIAGNOSIS — N18.30 ANEMIA IN STAGE 3 CHRONIC KIDNEY DISEASE (HCC): Primary | ICD-10-CM

## 2018-02-28 DIAGNOSIS — D63.1 ANEMIA IN STAGE 3 CHRONIC KIDNEY DISEASE (HCC): Primary | ICD-10-CM

## 2018-02-28 LAB
KAPPA LC FREE SER-MCNC: 59.1 MG/L (ref 3.3–19.4)
KAPPA LC FREE/LAMBDA FREE SER: 3 {RATIO} (ref 0.26–1.65)
LAMBDA LC FREE SERPL-MCNC: 19.7 MG/L (ref 5.7–26.3)
TACROLIMUS BLD-MCNC: 6.4 NG/ML (ref 2–20)

## 2018-02-28 NOTE — PROGRESS NOTES
Hello    Can patient be notified that renal function stable, but Hb lower  1) check fecal occult - slip sent to printer  2) tac level slightly above goal  Goal is closer to 4 now given paraproteinemia  3) therefore can patient lower by 1 mg total per day   If patient is on 4 mg in AM and 3 mg in PM, can patient lower to 3 mg BID of tac    Thank you    np

## 2018-03-02 LAB
ALBUMIN SERPL ELPH-MCNC: 3.28 G/DL (ref 3.5–5)
ALBUMIN SERPL ELPH-MCNC: 40.5 % (ref 52–65)
ALPHA1 GLOB SERPL ELPH-MCNC: 0.36 G/DL (ref 0.1–0.4)
ALPHA1 GLOB SERPL ELPH-MCNC: 4.5 % (ref 2.5–5)
ALPHA2 GLOB SERPL ELPH-MCNC: 0.79 G/DL (ref 0.4–1.2)
ALPHA2 GLOB SERPL ELPH-MCNC: 9.8 % (ref 7–13)
BETA GLOB ABNORMAL SERPL ELPH-MCNC: 0.42 G/DL (ref 0.4–0.8)
BETA1 GLOB SERPL ELPH-MCNC: 5.2 % (ref 5–13)
BETA2 GLOB SERPL ELPH-MCNC: 11.1 % (ref 2–8)
BETA2+GAMMA GLOB SERPL ELPH-MCNC: 0.9 G/DL (ref 0.2–0.5)
GAMMA GLOB ABNORMAL SERPL ELPH-MCNC: 2.34 G/DL (ref 0.5–1.6)
GAMMA GLOB SERPL ELPH-MCNC: 28.9 % (ref 12–22)
IGG/ALB SER: 0.68 {RATIO} (ref 1.1–1.8)
M PROTEIN 1 MFR SERPL ELPH: 27.8 %
M PROTEIN 1 SERPL ELPH-MCNC: 2.25 G/DL
PROT SERPL-MCNC: 8.1 G/DL (ref 6.4–8.2)

## 2018-03-07 NOTE — PROGRESS NOTES
Hello    Can patient repeat CMP, Tac, CBC, UPCR in 3 weeks  Also, can patient have appt with me with my next available  I do not see patient listed on schedule soon  Can patient be advised to bring all meds in next visit  Have not used [facility]B due to renal art stenosis       Thank you    jessica

## 2018-03-12 ENCOUNTER — TELEPHONE (OUTPATIENT)
Dept: BEHAVIORAL/MENTAL HEALTH CLINIC | Facility: CLINIC | Age: 53
End: 2018-03-12

## 2018-03-12 NOTE — TELEPHONE ENCOUNTER
Pt  Called for refill of Aripprazole 30 mg to [facility] [address] in [facility]

## 2018-03-13 DIAGNOSIS — E03.9 ACQUIRED HYPOTHYROIDISM: ICD-10-CM

## 2018-03-13 RX ORDER — LEVOTHYROXINE SODIUM 0.1 MG/1
TABLET ORAL
Qty: 90 TABLET | Refills: 2 | Status: SHIPPED | OUTPATIENT
Start: 2018-03-13 | End: 2018-05-01 | Stop reason: DRUGHIGH

## 2018-03-13 RX ORDER — LEVOTHYROXINE SODIUM 0.1 MG/1
100 TABLET ORAL DAILY
Qty: 30 TABLET | Refills: 2 | Status: SHIPPED | OUTPATIENT
Start: 2018-03-13 | End: 2018-03-13 | Stop reason: SDUPTHER

## 2018-03-14 DIAGNOSIS — E11.65 TYPE 2 DIABETES MELLITUS WITH HYPERGLYCEMIA, WITH LONG-TERM CURRENT USE OF INSULIN (HCC): Primary | ICD-10-CM

## 2018-03-14 DIAGNOSIS — Z79.4 TYPE 2 DIABETES MELLITUS WITH HYPERGLYCEMIA, WITH LONG-TERM CURRENT USE OF INSULIN (HCC): Primary | ICD-10-CM

## 2018-03-15 RX ORDER — PEN NEEDLE, DIABETIC 32GX 5/32"
NEEDLE, DISPOSABLE MISCELLANEOUS
Qty: 90 EACH | Refills: 0 | Status: SHIPPED | OUTPATIENT
Start: 2018-03-15 | End: 2018-08-23 | Stop reason: SDUPTHER

## 2018-03-20 DIAGNOSIS — I10 ESSENTIAL HYPERTENSION: Primary | ICD-10-CM

## 2018-03-20 DIAGNOSIS — I10 ESSENTIAL HYPERTENSION: ICD-10-CM

## 2018-03-20 RX ORDER — HYDRALAZINE HYDROCHLORIDE 50 MG/1
50 TABLET, FILM COATED ORAL 3 TIMES DAILY
Qty: 90 TABLET | Refills: 3 | Status: SHIPPED | OUTPATIENT
Start: 2018-03-20 | End: 2018-03-20 | Stop reason: SDUPTHER

## 2018-03-21 RX ORDER — HYDRALAZINE HYDROCHLORIDE 50 MG/1
TABLET, FILM COATED ORAL
Qty: 270 TABLET | Refills: 3 | Status: SHIPPED | OUTPATIENT
Start: 2018-03-21 | End: 2018-08-01 | Stop reason: SDUPTHER

## 2018-03-28 DIAGNOSIS — E11.65 TYPE 2 DIABETES MELLITUS WITH HYPERGLYCEMIA, UNSPECIFIED LONG TERM INSULIN USE STATUS: Primary | ICD-10-CM

## 2018-03-29 RX ORDER — INSULIN GLARGINE 300 U/ML
INJECTION, SOLUTION SUBCUTANEOUS
Qty: 4 PEN | Refills: 3 | Status: SHIPPED | OUTPATIENT
Start: 2018-03-29 | End: 2018-06-29 | Stop reason: DRUGHIGH

## 2018-04-02 DIAGNOSIS — E11.21 TYPE 2 DIABETES MELLITUS WITH DIABETIC NEPHROPATHY (HCC): ICD-10-CM

## 2018-04-02 DIAGNOSIS — E03.9 HYPOTHYROIDISM: ICD-10-CM

## 2018-04-02 DIAGNOSIS — E78.5 HYPERLIPIDEMIA: ICD-10-CM

## 2018-04-02 DIAGNOSIS — N18.4 CHRONIC KIDNEY DISEASE, STAGE IV (SEVERE) (HCC): ICD-10-CM

## 2018-04-04 ENCOUNTER — TELEPHONE (OUTPATIENT)
Dept: ENDOCRINOLOGY | Facility: CLINIC | Age: 53
End: 2018-04-04

## 2018-04-04 NOTE — TELEPHONE ENCOUNTER
Received fax from [place] [facility]   Requested Detail Written Order to be completed  and faxed along with office noted faxed back to her at [phone]    Completed and faxed

## 2018-04-05 ENCOUNTER — LAB (OUTPATIENT)
Dept: LAB | Age: 53
End: 2018-04-05
Payer: MEDICARE

## 2018-04-05 DIAGNOSIS — Z94.0 HISTORY OF KIDNEY TRANSPLANT: ICD-10-CM

## 2018-04-05 DIAGNOSIS — E03.9 ACQUIRED HYPOTHYROIDISM: ICD-10-CM

## 2018-04-05 DIAGNOSIS — E78.2 MIXED HYPERLIPIDEMIA: ICD-10-CM

## 2018-04-05 LAB
ANION GAP SERPL CALCULATED.3IONS-SCNC: 8 MMOL/L (ref 4–13)
BACTERIA UR QL AUTO: ABNORMAL /HPF
BILIRUB UR QL STRIP: NEGATIVE
BUN SERPL-MCNC: 33 MG/DL (ref 5–25)
CALCIUM SERPL-MCNC: 8.9 MG/DL (ref 8.3–10.1)
CHLORIDE SERPL-SCNC: 108 MMOL/L (ref 100–108)
CHOLEST SERPL-MCNC: 107 MG/DL (ref 50–200)
CLARITY UR: ABNORMAL
CO2 SERPL-SCNC: 21 MMOL/L (ref 21–32)
COLOR UR: YELLOW
CREAT SERPL-MCNC: 1.92 MG/DL (ref 0.6–1.3)
CREAT UR-MCNC: 62 MG/DL
ERYTHROCYTE [DISTWIDTH] IN BLOOD BY AUTOMATED COUNT: 20.2 % (ref 11.6–15.1)
GFR SERPL CREATININE-BSD FRML MDRD: 29 ML/MIN/1.73SQ M
GLUCOSE P FAST SERPL-MCNC: 109 MG/DL (ref 65–99)
GLUCOSE UR STRIP-MCNC: NEGATIVE MG/DL
HCT VFR BLD AUTO: 30.6 % (ref 34.8–46.1)
HDLC SERPL-MCNC: 32 MG/DL (ref 40–60)
HGB BLD-MCNC: 9.2 G/DL (ref 11.5–15.4)
HGB UR QL STRIP.AUTO: NEGATIVE
HYALINE CASTS #/AREA URNS LPF: ABNORMAL /LPF
KETONES UR STRIP-MCNC: NEGATIVE MG/DL
LDLC SERPL CALC-MCNC: 52 MG/DL (ref 0–100)
LEUKOCYTE ESTERASE UR QL STRIP: ABNORMAL
MCH RBC QN AUTO: 25.3 PG (ref 26.8–34.3)
MCHC RBC AUTO-ENTMCNC: 30.1 G/DL (ref 31.4–37.4)
MCV RBC AUTO: 84 FL (ref 82–98)
NITRITE UR QL STRIP: NEGATIVE
NON-SQ EPI CELLS URNS QL MICRO: ABNORMAL /HPF
PH UR STRIP.AUTO: 7 [PH] (ref 4.5–8)
PLATELET # BLD AUTO: 288 THOUSANDS/UL (ref 149–390)
PMV BLD AUTO: 11.8 FL (ref 8.9–12.7)
POTASSIUM SERPL-SCNC: 4.6 MMOL/L (ref 3.5–5.3)
PROT UR STRIP-MCNC: ABNORMAL MG/DL
PROT UR-MCNC: 90 MG/DL
PROT/CREAT UR: 1.45 MG/G{CREAT} (ref 0–0.1)
RBC # BLD AUTO: 3.64 MILLION/UL (ref 3.81–5.12)
RBC #/AREA URNS AUTO: ABNORMAL /HPF
SODIUM SERPL-SCNC: 137 MMOL/L (ref 136–145)
SP GR UR STRIP.AUTO: 1.01 (ref 1–1.03)
T4 FREE SERPL-MCNC: 0.91 NG/DL (ref 0.76–1.46)
TACROLIMUS BLD-MCNC: 8.3 NG/ML (ref 2–20)
TRIGL SERPL-MCNC: 114 MG/DL
TSH SERPL DL<=0.05 MIU/L-ACNC: 5.07 UIU/ML (ref 0.36–3.74)
UROBILINOGEN UR QL STRIP.AUTO: 0.2 E.U./DL
WBC # BLD AUTO: 10.41 THOUSAND/UL (ref 4.31–10.16)
WBC #/AREA URNS AUTO: ABNORMAL /HPF

## 2018-04-05 PROCEDURE — 85027 COMPLETE CBC AUTOMATED: CPT

## 2018-04-05 PROCEDURE — 80061 LIPID PANEL: CPT

## 2018-04-05 PROCEDURE — 81001 URINALYSIS AUTO W/SCOPE: CPT

## 2018-04-05 PROCEDURE — 84439 ASSAY OF FREE THYROXINE: CPT

## 2018-04-05 PROCEDURE — 84443 ASSAY THYROID STIM HORMONE: CPT

## 2018-04-05 PROCEDURE — 84156 ASSAY OF PROTEIN URINE: CPT

## 2018-04-05 PROCEDURE — 80197 ASSAY OF TACROLIMUS: CPT

## 2018-04-05 PROCEDURE — 80048 BASIC METABOLIC PNL TOTAL CA: CPT

## 2018-04-05 PROCEDURE — 36415 COLL VENOUS BLD VENIPUNCTURE: CPT

## 2018-04-05 PROCEDURE — 82570 ASSAY OF URINE CREATININE: CPT

## 2018-04-07 DIAGNOSIS — Z94.0 HISTORY OF KIDNEY TRANSPLANT: ICD-10-CM

## 2018-04-12 DIAGNOSIS — Z94.0 TRANSPLANTED KIDNEY: Primary | ICD-10-CM

## 2018-04-12 NOTE — PROGRESS NOTES
Hello    Patient normally is followed up by Ms Christian Hinds  Can patient be notified that protein in urine is improving  Cr slightly higher  Tac slightly higher  Was this accurate 11-13 hour trough?  Is patient drinking 2 L fluids daily    Can patient repeat BMP and tac in 1-2 weeks (she can do monthly labs earlier)    Thank you    np

## 2018-04-13 ENCOUNTER — APPOINTMENT (OUTPATIENT)
Dept: LAB | Age: 53
End: 2018-04-13
Payer: MEDICARE

## 2018-04-13 ENCOUNTER — TELEPHONE (OUTPATIENT)
Dept: NEPHROLOGY | Facility: CLINIC | Age: 53
End: 2018-04-13

## 2018-04-13 ENCOUNTER — TRANSCRIBE ORDERS (OUTPATIENT)
Dept: ADMINISTRATIVE | Age: 53
End: 2018-04-13

## 2018-04-13 DIAGNOSIS — C90.00 MULTIPLE MYELOMA NOT HAVING ACHIEVED REMISSION (HCC): ICD-10-CM

## 2018-04-13 DIAGNOSIS — C90.00 MYELOMA ASSOCIATED AMYLOIDOSIS (HCC): Primary | ICD-10-CM

## 2018-04-13 DIAGNOSIS — E85.9 MYELOMA ASSOCIATED AMYLOIDOSIS (HCC): Primary | ICD-10-CM

## 2018-04-13 DIAGNOSIS — E85.9 MYELOMA ASSOCIATED AMYLOIDOSIS (HCC): ICD-10-CM

## 2018-04-13 DIAGNOSIS — C90.00 MYELOMA ASSOCIATED AMYLOIDOSIS (HCC): ICD-10-CM

## 2018-04-13 LAB
ALBUMIN SERPL BCP-MCNC: 2.9 G/DL (ref 3.5–5)
ALP SERPL-CCNC: 116 U/L (ref 46–116)
ALT SERPL W P-5'-P-CCNC: 26 U/L (ref 12–78)
ANION GAP SERPL CALCULATED.3IONS-SCNC: 5 MMOL/L (ref 4–13)
AST SERPL W P-5'-P-CCNC: 21 U/L (ref 5–45)
BASOPHILS # BLD AUTO: 0.09 THOUSANDS/ΜL (ref 0–0.1)
BASOPHILS NFR BLD AUTO: 1 % (ref 0–1)
BILIRUB SERPL-MCNC: 0.37 MG/DL (ref 0.2–1)
BUN SERPL-MCNC: 30 MG/DL (ref 5–25)
CALCIUM SERPL-MCNC: 8.8 MG/DL
CHLORIDE SERPL-SCNC: 111 MMOL/L (ref 100–108)
CO2 SERPL-SCNC: 24 MMOL/L (ref 21–32)
CREAT SERPL-MCNC: 1.81 MG/DL (ref 0.6–1.3)
EOSINOPHIL # BLD AUTO: 0.21 THOUSAND/ΜL (ref 0–0.61)
EOSINOPHIL NFR BLD AUTO: 3 % (ref 0–6)
ERYTHROCYTE [DISTWIDTH] IN BLOOD BY AUTOMATED COUNT: 20.4 % (ref 11.6–15.1)
GFR SERPL CREATININE-BSD FRML MDRD: 31 ML/MIN/1.73SQ M
GLUCOSE P FAST SERPL-MCNC: 68 MG/DL (ref 65–99)
HCT VFR BLD AUTO: 29.1 % (ref 34.8–46.1)
HGB BLD-MCNC: 8.8 G/DL (ref 11.5–15.4)
IGA SERPL-MCNC: 40 MG/DL (ref 70–400)
IGG SERPL-MCNC: 2840 MG/DL (ref 700–1600)
IGM SERPL-MCNC: 32 MG/DL (ref 40–230)
LYMPHOCYTES # BLD AUTO: 1.81 THOUSANDS/ΜL (ref 0.6–4.47)
LYMPHOCYTES NFR BLD AUTO: 22 % (ref 14–44)
MCH RBC QN AUTO: 25.4 PG (ref 26.8–34.3)
MCHC RBC AUTO-ENTMCNC: 30.2 G/DL (ref 31.4–37.4)
MCV RBC AUTO: 84 FL (ref 82–98)
MONOCYTES # BLD AUTO: 0.34 THOUSAND/ΜL (ref 0.17–1.22)
MONOCYTES NFR BLD AUTO: 4 % (ref 4–12)
NEUTROPHILS # BLD AUTO: 5.91 THOUSANDS/ΜL (ref 1.85–7.62)
NEUTS SEG NFR BLD AUTO: 70 % (ref 43–75)
NRBC BLD AUTO-RTO: 0 /100 WBCS
PLATELET # BLD AUTO: 197 THOUSANDS/UL (ref 149–390)
POTASSIUM SERPL-SCNC: 4.3 MMOL/L (ref 3.5–5.3)
PROT SERPL-MCNC: 8.9 G/DL (ref 6.4–8.2)
RBC # BLD AUTO: 3.46 MILLION/UL (ref 3.81–5.12)
SODIUM SERPL-SCNC: 140 MMOL/L (ref 136–145)
WBC # BLD AUTO: 8.43 THOUSAND/UL (ref 4.31–10.16)

## 2018-04-13 PROCEDURE — 80053 COMPREHEN METABOLIC PANEL: CPT

## 2018-04-13 PROCEDURE — 36415 COLL VENOUS BLD VENIPUNCTURE: CPT

## 2018-04-13 PROCEDURE — 85025 COMPLETE CBC W/AUTO DIFF WBC: CPT

## 2018-04-13 PROCEDURE — 82784 ASSAY IGA/IGD/IGG/IGM EACH: CPT

## 2018-04-13 PROCEDURE — 83883 ASSAY NEPHELOMETRY NOT SPEC: CPT

## 2018-04-13 NOTE — TELEPHONE ENCOUNTER
----- Message from Jairo Winn sent at 4/12/2018 11:43 AM EDT -----  Spoke to pt - was less than 11 hrs  since she took her Tac when she had the lab work done,she is drinking 2 liters of fluids daily,mailed labs to pt     ----- Message -----  From: Yesenia Chinchilla MD  Sent: 4/12/2018   9:58 AM  To: Nephrology [address] [place] [address]    Patient normally is followed up by Ms Jairo Pa  Can patient be notified that protein in urine is improving  Cr slightly higher  Tac slightly higher  Was this accurate 11-13 hour trough?  Is patient drinking 2 L fluids daily    Can patient repeat BMP and tac in 1-2 weeks (she can do monthly labs earlier)    Thank you    np

## 2018-04-14 LAB
KAPPA LC FREE SER-MCNC: 54.5 MG/L (ref 3.3–19.4)
KAPPA LC FREE/LAMBDA FREE SER: 3.21 {RATIO} (ref 0.26–1.65)
LAMBDA LC FREE SERPL-MCNC: 17 MG/L (ref 5.7–26.3)

## 2018-04-20 ENCOUNTER — OFFICE VISIT (OUTPATIENT)
Dept: HEMATOLOGY ONCOLOGY | Facility: CLINIC | Age: 53
End: 2018-04-20
Payer: MEDICARE

## 2018-04-20 VITALS
TEMPERATURE: 97.6 F | WEIGHT: 232 LBS | SYSTOLIC BLOOD PRESSURE: 160 MMHG | RESPIRATION RATE: 16 BRPM | HEIGHT: 67 IN | BODY MASS INDEX: 36.41 KG/M2 | HEART RATE: 75 BPM | DIASTOLIC BLOOD PRESSURE: 60 MMHG | OXYGEN SATURATION: 97 %

## 2018-04-20 DIAGNOSIS — N18.30 ANEMIA IN STAGE 3 CHRONIC KIDNEY DISEASE (HCC): ICD-10-CM

## 2018-04-20 DIAGNOSIS — D47.2 SMOLDERING MULTIPLE MYELOMA: Primary | ICD-10-CM

## 2018-04-20 DIAGNOSIS — D63.1 ANEMIA IN STAGE 3 CHRONIC KIDNEY DISEASE (HCC): ICD-10-CM

## 2018-04-20 PROCEDURE — 99214 OFFICE O/P EST MOD 30 MIN: CPT | Performed by: INTERNAL MEDICINE

## 2018-04-20 NOTE — LETTER
April 20, 2018     Reon Wilson, [address]    Patient: Chanelle Galdamez   YOB: 1965   Date of Visit: 4/20/2018       Dear Dr Reza Edouard:    Thank you for referring Chanelle Galdamez to me for evaluation  Below are my notes for this consultation  If you have questions, please do not hesitate to call me  I look forward to following your patient along with you  Sincerely,        Jessica Ivey MD        CC: MD Malaika Morrison MD Toby Pigg, MD Ardelle Pattee, MD  4/20/2018 11:11 AM  Sign at close encounter  Hematology Outpatient Follow - Up Note  Chanelle Galdamez 46 y o  female MRN: @ Encounter: [id number]        Date:  4/20/2018        Assessment/ Plan:    IgG kappa smoldering multiple myeloma in a patient who had a history of pancreatic and kidney transplant in 1998 at [address] [place] [address] 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, a bone marrow biopsy in August 2017 showed 15-20% plasma cells, normal cytogenetics and normal FISH panel for myeloma, evaluated by Dr Irish Figueroa at [facility], the decision to continue watchful observation and the patient has progression of disease she would be treated with Velcade / Decadron based therapy  The patient had progression of increasing creatinine was more than 9, might be related to tacrolimus, she has mildly elevated IgG level at 1800, I will continue watchful observation however with close follow-up in 2 months with serum protein electrophoresis, free light chain, quantitative immunoglobulin, CBC, CMP          HPI:   59-year-old  female with diabetes mellitus type 1, diabetic neuropathy, diabetic nephropathy, status post kidney and pancreatic transplant in 1998 at [facility], amputation of the right big toe, cholecystectomy, vitamin-D deficiency, with exacerbation diabetes mellitus, and possible pancreatic burn out, workup was found to have abnormal serum protein electrophoresis in August 2017 with IgG kappa, the 1st peak of 0 54 grams/deciliter and the 2nd peak of 2 27 grams/deciliter, mildly elevated kappa light chain, chronic kidney disease, a bone marrow biopsy showed 15-20% plasma cells, normal cytogenetics and normal FISH panel for myeloma, skeletal survey showed no evidence of lytic lesion, she was diagnosed with smoldering multiple myeloma   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, she was evaluated by Dr Leonel Claire at [facility] the decision to continue watchful observation with serum protein electrophoresis, free light chain, every three-month    Interval History:        Previous Treatment:         Test Results:    Imaging: No results found  Labs:   Lab Results   Component Value Date    WBC 8 43 04/13/2018    HGB 8 8 (L) 04/13/2018    HCT 29 1 (L) 04/13/2018    MCV 84 04/13/2018     04/13/2018     Lab Results   Component Value Date     04/13/2018    K 4 3 04/13/2018     (H) 04/13/2018    CO2 24 04/13/2018    ANIONGAP 5 04/13/2018    BUN 30 (H) 04/13/2018    CREATININE 1 81 (H) 04/13/2018    GLUCOSE 88 02/03/2018    GLUF 68 04/13/2018    CALCIUM 8 8 04/13/2018    AST 21 04/13/2018    ALT 26 04/13/2018    ALKPHOS 116 04/13/2018    PROT 8 9 (H) 04/13/2018    BILITOT 0 37 04/13/2018    EGFR 31 04/13/2018       Lab Results   Component Value Date    IRON 40 (L) 02/05/2018    TIBC 293 02/05/2018    FERRITIN 19 02/05/2018       Lab Results   Component Value Date    QVMFQMES69 438 07/14/2014         ROS:   Review of Systems   Constitutional: Positive for fatigue  Negative for activity change, appetite change, diaphoresis, fever and unexpected weight change  HENT: Negative for facial swelling, hearing loss, rhinorrhea, sinus pain, sinus pressure, sneezing, sore throat and tinnitus  Eyes: Negative for photophobia, pain, discharge, redness, itching and visual disturbance  Respiratory: Positive for shortness of breath   Negative for apnea and chest tightness  Cardiovascular: Negative for chest pain, palpitations and leg swelling  Gastrointestinal: Negative for abdominal distention, abdominal pain, blood in stool, constipation, diarrhea, nausea, rectal pain and vomiting  Endocrine: Negative for cold intolerance, heat intolerance, polydipsia and polyphagia  Genitourinary: Negative for difficulty urinating, dyspareunia, frequency, hematuria, pelvic pain and urgency  Musculoskeletal: Negative for arthralgias, back pain, gait problem, joint swelling and myalgias  Skin: Negative for color change, pallor and rash  Allergic/Immunologic: Negative for environmental allergies and food allergies  Neurological: Negative for dizziness, tremors, seizures, syncope, speech difficulty, numbness and headaches  Hematological: Negative for adenopathy  Does not bruise/bleed easily  Psychiatric/Behavioral: Negative for agitation, confusion, dysphoric mood, hallucinations and suicidal ideas  Current Medications: Reviewed  Allergies: Reviewed  PMH/FH/SH:  Reviewed      Physical Exam:    Body surface area is 2 15 meters squared  Wt Readings from Last 3 Encounters:   04/20/18 105 kg (232 lb)   02/16/18 103 kg (228 lb)   02/02/18 99 8 kg (220 lb)        Temp Readings from Last 3 Encounters:   04/20/18 97 6 °F (36 4 °C) (Tympanic)   02/16/18 97 9 °F (36 6 °C) (Tympanic)   02/04/18 97 9 °F (36 6 °C) (Oral)        BP Readings from Last 3 Encounters:   04/20/18 160/60   02/16/18 154/64   02/04/18 170/78         Pulse Readings from Last 3 Encounters:   04/20/18 75   02/16/18 71   02/04/18 78        Physical Exam   Constitutional: She is oriented to person, place, and time  She appears well-developed and well-nourished  No distress  HENT:   Head: Normocephalic and atraumatic  Mouth/Throat: Oropharynx is clear and moist  No oropharyngeal exudate  Eyes: Conjunctivae and EOM are normal  Pupils are equal, round, and reactive to light  Neck: Normal range of motion  Neck supple  No tracheal deviation present  No thyromegaly present  Cardiovascular: Normal rate and regular rhythm  Exam reveals no gallop and no friction rub  No murmur heard  Pulmonary/Chest: Effort normal and breath sounds normal  No respiratory distress  She has no wheezes  She has no rales  She exhibits no tenderness  Abdominal: Soft  Bowel sounds are normal  She exhibits no distension and no mass  There is no tenderness  There is no rebound and no guarding  Musculoskeletal: Normal range of motion  She exhibits edema  Lymphadenopathy:     She has no cervical adenopathy  Neurological: She is alert and oriented to person, place, and time  Skin: Skin is warm and dry  No rash noted  She is not diaphoretic  No erythema  No pallor  Psychiatric: She has a normal mood and affect  Her behavior is normal  Judgment and thought content normal    Vitals reviewed  Goals and Barriers:  Current Goal: Minimize effects of disease  Barriers: None  Patient's Capacity to Self Care:  Patient is able to self care      Code Status: [unfilled]

## 2018-04-20 NOTE — PROGRESS NOTES
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, a bone marrow biopsy in August 2017 showed 15-20% plasma cells, normal cytogenetics and normal FISH panel for myeloma, evaluated by Dr Ivory Taveras at [facility], the decision to continue watchful observation and the patient has progression of disease she would be treated with Velcade / Decadron based therapy  The patient had progression of increasing creatinine was more than 9, might be related to tacrolimus, she has mildly elevated IgG level at 1800, I will continue watchful observation however with close follow-up in 2 months with serum protein electrophoresis, free light chain, quantitative immunoglobulin, CBC, CMP          HPI:   58-year-old  female with diabetes mellitus type 1, diabetic neuropathy, diabetic nephropathy, status post kidney and pancreatic transplant in 1998 at [address] [place] [address], amputation of the right big toe, cholecystectomy, vitamin-D deficiency, with exacerbation diabetes mellitus, and possible pancreatic burn out, workup was found to have abnormal serum protein electrophoresis in August 2017 with IgG kappa, the 1st peak of 0 54 grams/deciliter and the 2nd peak of 2 27 grams/deciliter, mildly elevated kappa light chain, chronic kidney disease, a bone marrow biopsy showed 15-20% plasma cells, normal cytogenetics and normal FISH panel for myeloma, skeletal survey showed no evidence of lytic lesion, she was diagnosed with smoldering multiple myeloma   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, she was evaluated by Dr Chantel Vazquez at [facility] the decision to continue watchful observation with serum protein electrophoresis, free light chain, every three-month    Interval History:        Previous Treatment:         Test Results:    Imaging: No results found  Labs:   Lab Results   Component Value Date    WBC 8 43 04/13/2018    HGB 8 8 (L) 04/13/2018    HCT 29 1 (L) 04/13/2018    MCV 84 04/13/2018     04/13/2018     Lab Results   Component Value Date     04/13/2018    K 4 3 04/13/2018     (H) 04/13/2018    CO2 24 04/13/2018    ANIONGAP 5 04/13/2018    BUN 30 (H) 04/13/2018    CREATININE 1 81 (H) 04/13/2018    GLUCOSE 88 02/03/2018    GLUF 68 04/13/2018    CALCIUM 8 8 04/13/2018    AST 21 04/13/2018    ALT 26 04/13/2018    ALKPHOS 116 04/13/2018    PROT 8 9 (H) 04/13/2018    BILITOT 0 37 04/13/2018    EGFR 31 04/13/2018       Lab Results   Component Value Date    IRON 40 (L) 02/05/2018    TIBC 293 02/05/2018    FERRITIN 19 02/05/2018       Lab Results   Component Value Date    LBFKNDOR20 438 07/14/2014         ROS:   Review of Systems   Constitutional: Positive for fatigue  Negative for activity change, appetite change, diaphoresis, fever and unexpected weight change  HENT: Negative for facial swelling, hearing loss, rhinorrhea, sinus pain, sinus pressure, sneezing, sore throat and tinnitus  Eyes: Negative for photophobia, pain, discharge, redness, itching and visual disturbance  Respiratory: Positive for shortness of breath  Negative for apnea and chest tightness  Cardiovascular: Negative for chest pain, palpitations and leg swelling  Gastrointestinal: Negative for abdominal distention, abdominal pain, blood in stool, constipation, diarrhea, nausea, rectal pain and vomiting  Endocrine: Negative for cold intolerance, heat intolerance, polydipsia and polyphagia  Genitourinary: Negative for difficulty urinating, dyspareunia, frequency, hematuria, pelvic pain and urgency  Musculoskeletal: Negative for arthralgias, back pain, gait problem, joint swelling and myalgias     Skin: Negative for color change, pallor and rash  Allergic/Immunologic: Negative for environmental allergies and food allergies  Neurological: Negative for dizziness, tremors, seizures, syncope, speech difficulty, numbness and headaches  Hematological: Negative for adenopathy  Does not bruise/bleed easily  Psychiatric/Behavioral: Negative for agitation, confusion, dysphoric mood, hallucinations and suicidal ideas  Current Medications: Reviewed  Allergies: Reviewed  PMH/FH/SH:  Reviewed      Physical Exam:    Body surface area is 2 15 meters squared  Wt Readings from Last 3 Encounters:   04/20/18 105 kg (232 lb)   02/16/18 103 kg (228 lb)   02/02/18 99 8 kg (220 lb)        Temp Readings from Last 3 Encounters:   04/20/18 97 6 °F (36 4 °C) (Tympanic)   02/16/18 97 9 °F (36 6 °C) (Tympanic)   02/04/18 97 9 °F (36 6 °C) (Oral)        BP Readings from Last 3 Encounters:   04/20/18 160/60   02/16/18 154/64   02/04/18 170/78         Pulse Readings from Last 3 Encounters:   04/20/18 75   02/16/18 71   02/04/18 78        Physical Exam   Constitutional: She is oriented to person, place, and time  She appears well-developed and well-nourished  No distress  HENT:   Head: Normocephalic and atraumatic  Mouth/Throat: Oropharynx is clear and moist  No oropharyngeal exudate  Eyes: Conjunctivae and EOM are normal  Pupils are equal, round, and reactive to light  Neck: Normal range of motion  Neck supple  No tracheal deviation present  No thyromegaly present  Cardiovascular: Normal rate and regular rhythm  Exam reveals no gallop and no friction rub  No murmur heard  Pulmonary/Chest: Effort normal and breath sounds normal  No respiratory distress  She has no wheezes  She has no rales  She exhibits no tenderness  Abdominal: Soft  Bowel sounds are normal  She exhibits no distension and no mass  There is no tenderness  There is no rebound and no guarding  Musculoskeletal: Normal range of motion  She exhibits edema     Lymphadenopathy:     She has no cervical adenopathy  Neurological: She is alert and oriented to person, place, and time  Skin: Skin is warm and dry  No rash noted  She is not diaphoretic  No erythema  No pallor  Psychiatric: She has a normal mood and affect  Her behavior is normal  Judgment and thought content normal    Vitals reviewed  Goals and Barriers:  Current Goal: Minimize effects of disease  Barriers: None  Patient's Capacity to Self Care:  Patient is able to self care      Code Status[facility]@

## 2018-04-25 DIAGNOSIS — K21.9 GASTROESOPHAGEAL REFLUX DISEASE WITHOUT ESOPHAGITIS: Primary | ICD-10-CM

## 2018-04-26 RX ORDER — PANTOPRAZOLE SODIUM 40 MG/1
TABLET, DELAYED RELEASE ORAL
Qty: 90 TABLET | Refills: 0 | Status: SHIPPED | OUTPATIENT
Start: 2018-04-26 | End: 2018-07-10 | Stop reason: ALTCHOICE

## 2018-05-01 ENCOUNTER — OFFICE VISIT (OUTPATIENT)
Dept: ENDOCRINOLOGY | Facility: CLINIC | Age: 53
End: 2018-05-01
Payer: MEDICARE

## 2018-05-01 VITALS
HEIGHT: 67 IN | HEART RATE: 74 BPM | BODY MASS INDEX: 35.94 KG/M2 | SYSTOLIC BLOOD PRESSURE: 150 MMHG | WEIGHT: 229 LBS | DIASTOLIC BLOOD PRESSURE: 62 MMHG

## 2018-05-01 DIAGNOSIS — E55.9 VITAMIN D DEFICIENCY: ICD-10-CM

## 2018-05-01 DIAGNOSIS — I10 ESSENTIAL HYPERTENSION: ICD-10-CM

## 2018-05-01 DIAGNOSIS — E10.8 TYPE 1 DIABETES MELLITUS WITH COMPLICATION (HCC): ICD-10-CM

## 2018-05-01 DIAGNOSIS — E03.9 ACQUIRED HYPOTHYROIDISM: Primary | ICD-10-CM

## 2018-05-01 PROCEDURE — 99215 OFFICE O/P EST HI 40 MIN: CPT | Performed by: INTERNAL MEDICINE

## 2018-05-01 RX ORDER — LEVOTHYROXINE SODIUM 112 UG/1
112 TABLET ORAL DAILY
Qty: 30 TABLET | Refills: 4 | Status: SHIPPED | OUTPATIENT
Start: 2018-05-01 | End: 2018-05-03 | Stop reason: SDUPTHER

## 2018-05-01 NOTE — PATIENT INSTRUCTIONS
INSULIN DOSAGE INSTRUCTIONS    Name: Mauricio Luna                        : 1965  MRN #: [id number]    Your Current Insulin  and dose is: Before Breakfast Before Lunch Before Evening Meal Bedtime   Humalog Insulin     4 units      5 units    5 units     Regular, Apidra, Humalog orNovolog Sliding Scale:   <80              151-200 + 1 +1 +1    201-250 + 2 +2 +2 +   251-300 + 3 +3 +3 +   301-350 + 4 +4 +4 +   >350 + 5 +5 +5 +       Lantus Insulin        20 units      Additional Instructions:   Please test your blood sugar:  _4_ Times per day  X_ Before Breakfast                _ Alternate Testing  X_ Before Lunch                _ 2 Hours After  Meal  X_ Before Evening Meal               _ 3 a m   x_ Before Bedtime Snack     Target Blood sugar range _80-180 mg/dl   Call if   blood sugar is less than _70_ or greater than _350 _  Today's Date: 2018       Hypoglycemia instructions   Mauricio Luna  2018  [id number]    Low Blood Sugar    Steps to treat low blood sugar  1  Test blood sugar if you have symptoms of low blood sugar:   Low Blood Sugar Symptoms:  o Sweaty  o Dizzy  o Rapid heartbeat  o Shaky    o Bad mood  o Hungry      2  Treat blood sugar less than 70 with 15 grams of fast-acting carbohydrate:   Examples of 15 grams Fast-Acting Carbohydrate:  o 4 oz juice  o 4 oz regular soda  o 3-4 glucose tablets (chew)  o 3-4 hard candies (chew)              3    Wait 15 minutes and test your blood sugar again           4   If blood sugar is less than 100, repeat steps 2-3       5  When your blood sugar is 100 or more, eat a snack if it will be longer than one hour until your next meal  The snack should be 15 grams of carbohydrate and a protein:   Examples of snacks:  o ½ sandwich  o 6 crackers with cheese  o Piece of fruit with cheese or peanut butter  o 6 crackers with peanut butter

## 2018-05-01 NOTE — PROGRESS NOTES
New Patient Progress Note      No chief complaint on file  Referring Provider  M[address] [facility]     History of Present Illness: Bjorn Baumgarten is a 46 y o  female with a history of type 2  diabetes with long term use of insulin since 19 yrs   Diabetes course has been stable  Reports complications of  Diabetes such as CKD stage 5, S/o kidney transplant and pancreatic transplant   She has diabetic retinopathy and neuropathy  Denies recent illness or hospitalizations  Denies recent severe hypoglycemic or severe hyperglycemic episodes  Denies any issues with her current regimen  home glucose monitoring: are performed regularly    Home blood glucose readings:   Before breakfast: 70- 120 mg/dl   Before lunch: 116-128 mg/dl   Before dinner: 120-140 mg/dl   Bedtime:  mg/dl     Current regimen: toujeo 20 units at bedtime, humalog 5 units with meals +Scale   compliant most of the timedenies any side effects from medications  Injects in: abdomen  Rotates sites: Yes  Hypoglycemic episodes: Once a week, usually happens after breakfast   H/o of hypoglycemia causing hospitalization or Intervention such as glucagon injection  or ambulance call :  No  Hypoglycemia symptoms: jitteriness and sweating  Treatment of hypoglycemia: Juice      Medic alert tag: recommended: Yes    Diabetes education: Yes   Diet: 3  meals per day, 1-2  snacks per day  Timing of meals is predictable   Yes  diabetic diet compliance:  compliant most of the time  Activity: Daily activity is predictable Yes , does not exercise              further diabetic ROS: no polyuria or polydipsia, no chest pain, dyspnea or TIAs, no numbness, tingling or pain in extremities, no unusual visual symptoms, no hypoglycemia, no medication side effects noted      acute symptoms are None     Opthamology: sees regularly ; no retinopathy, no macular edema  Podiatry: yes , saw week ago   Infuenza vaccine: Yes     Has hypertension: followed by PCP; on  Doses dose in 1 mg daily, clonidine 0 3 mg every 8 hr, amlodipine 10 mg 3 times daily, hydralazine 50 mg 3 times a day compliant most of the time  Has hyperlipidemia: followed by PCP; on statin,  Pravastatin 80 mg daily - tolerating well, no myalgias  compliant most of the time  denies any side effects from medications  Thyroid disorders: Hypothyroidism , currently Taking  [id number] mcg daily,  Admits compliance  she takes it on empty stomach      History of pancreatitis: No     Lab Results   Component Value Date    HGBA1C 5 3 12/07/2017       Component      Latest Ref Rng & Units 4/5/2018 4/13/2018   Sodium      136 - 145 mmol/L  140   Potassium      3 5 - 5 3 mmol/L  4 3   Chloride      100 - 108 mmol/L  111 (H)   CO2      21 - 32 mmol/L  24   Anion Gap      4 - 13 mmol/L  5   BUN      5 - 25 mg/dL  30 (H)   Creatinine      0 60 - 1 30 mg/dL  1 81 (H)   GLUCOSE FASTING      65 - 99 mg/dL  68   Calcium      mg/dL  8 8   AST      5 - 45 U/L  21   ALT      12 - 78 U/L  26   Alkaline Phosphatase      46 - 116 U/L  116   Total Protein      6 4 - 8 2 g/dL  8 9 (H)   Albumin      3 5 - 5 0 g/dL  2 9 (L)   Total Bilirubin      0 20 - 1 00 mg/dL  0 37   eGFR      ml/min/1 73sq m  31   IMMUNOGLOBULIN KAPPA FREE LIGHT CHAIN      3 3 - 19 4 mg/L  54 5 (H)   IMMUNOGLOBULIN LAMBDA FREE LIGHT CHAIN      5 7 - 26 3 mg/L  17 0   KAPPA/LAMBDA FREE LIGHT CHAIN RATIO      0 26 - 1 65  3 21 (H)   TSH 3RD GENERATON      0 358 - 3 740 uIU/mL 5 070 (H)    Free T4      0 76 - 1 46 ng/dL 0 91        Patient Active Problem List   Diagnosis    Renal transplant recipient    Chronic kidney disease, stage 4 (severe) (HCC)    Acquired hypothyroidism    Benign hypertension with CKD (chronic kidney disease) stage IV (HCC)    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 (HCC)    Essential hypertension    Anemia    Status post kidney transplant    Immunosuppression (HCC)  Chronic diastolic congestive heart failure (HCC)    Urinary incontinence    Calculus, bladder, diverticulum    Stage 3 chronic kidney disease    Hypercalcemia    MGUS (monoclonal gammopathy of unknown significance)    Chronic constipation    Acid reflux disease    Atrial fibrillation (HCC)    Kaiser esophagus    Cataract    Cocaine abuse in remission    Gastric and duodenal disease    Diabetic nephropathy (HCC)    Diabetic polyneuropathy (HCC)    Diabetic retinopathy (HCC)    Diastolic dysfunction    Essential and other specified forms of tremor    Failure of pancreas transplant    FELIPE (generalized anxiety disorder)    Hyperlipidemia    Indolent multiple myeloma ([address] )    Irritable bowel syndrome    Major depressive disorder, recurrent episode, moderate (HCC)    Nonrheumatic aortic valve insufficiency    OAB (overactive bladder)    Osteoporosis    Other insomnia    Peripheral vascular disease (HCC)    Post-traumatic stress disorder, chronic    Recurrent UTI    Restless legs syndrome    Secondary hyperparathyroidism, renal ([address] )    Anxiety    Depression    Neck strain      Past Medical History:   Diagnosis Date    Abnormal liver function test     Acute kidney injury ([address] )     Acute on chronic congestive heart failure (HCC)     Allergic urticaria     Anemia     Cancer (HCC)     Multiple myeloma    Cervical dysplasia     Cholelithiasis     Chronic diastolic (congestive) heart failure ([address] ) 9/18/2017    Diabetes mellitus (HCC)     Previous, controlled with diet    Diabetes mellitus with foot ulcer ([address] )     Disease of thyroid gland     Encephalopathy     Hematuria     + leak est- secondary to UTIs/panc drainage    History of transfusion     Hyperkalemia     Hypertension     Iliotibial band syndrome     Lumbar radiculopathy     Night blindness     Nonrheumatic aortic (valve) insufficiency     Pneumonia     Psychiatric disorder     Renal disorder     Retinopathy     Seborrhea     Seizure ([facility]     Sinus tachycardia     B blocker - cardio echo stress test 02 normal/neg LE doppler 2/02 OK and 12/07    Status post simultaneous kidney and pancreas transplant ([facility] )     Toe amputation status (HCC)     Trochanteric bursitis       Past Surgical History:   Procedure Laterality Date    CATARACT EXTRACTION      CHOLECYSTECTOMY      COLONOSCOPY      two polyps in the rectum removed and biopsied diverticulosis in the sigmoid colon, external hemorrhoiods- Dr Barfield    COMBINED KIDNEY-PANCREAS TRANSPLANT N/A     CYSTOSCOPY N/A 10/13/2016    Procedure: CYSTOSCOPY, retrograde pyelogram, biopsy of ureteral polyp; Surgeon: Dolores Long MD;  Location: [facility];  Service:    Isaak Eduardo DILATION AND CURETTAGE OF UTERUS      ESOPHAGOGASTRODUODENOSCOPY N/A 11/20/2017    Procedure: ESOPHAGOGASTRODUODENOSCOPY (EGD); Surgeon: [employer], DO;  Location: [facility];   Service: Gastroenterology    EYE SURGERY      cataracts    FOOT AMPUTATION THROUGH METATARSAL Left     FOOT SURGERY Right     excision of metatarsal heads    HALLUX VALGUS CORRECTION Right     NEPHRECTOMY TRANSPLANTED ORGAN      PANCREATIC TRANSPLANT REMOVAL  1998      Family History   Problem Relation Age of Onset    Hypertension Mother     Cancer Mother     Hypertension Father     Cancer Father     Cancer Maternal Grandfather     Cancer Paternal Grandmother     Cancer Paternal Grandfather     Depression Sister     Breast cancer Maternal Grandmother     Cancer Other      Social History   Substance Use Topics    Smoking status: Former Smoker     Quit date: 4/28/2012    Smokeless tobacco: Never Used    Alcohol use No      Comment: (history)     Allergies   Allergen Reactions    Cefadroxil     Morphine And Related GI Intolerance    Penicillins Hives     Tolerates cefazolin    Myrbetriq [Mirabegron] Hives         Current Outpatient Prescriptions:     amLODIPine (NORVASC) 10 mg tablet, Take 10 mg by mouth 3 (three) times a day 10mg in the morning & 5mg in the evening , Disp: , Rfl:     ARIPiprazole (ABILIFY) 30 mg tablet, Take 1 tablet by mouth, Disp: , Rfl:     aspirin 81 MG tablet, Take 1 tablet by mouth daily, Disp: , Rfl:     BD PEN NEEDLE VISHNU U/F 32G X 4 MM MISC, USE FOUR TIMES DAILY, Disp: 90 each, Rfl: 0    busPIRone (BUSPAR) 5 mg tablet, Take 1 tablet by mouth 2 (two) times a day for 180 days, Disp: 180 tablet, Rfl: 1    Cholecalciferol (VITAMIN D3) 1000 units CAPS, Take by mouth, Disp: , Rfl:     cloNIDine (CATAPRES) 0 1 mg tablet, Take 0 3 mg by mouth every 8 (eight) hours  , Disp: , Rfl:     doxazosin (CARDURA) 1 mg tablet, Take 1 tablet by mouth, Disp: , Rfl:     DULoxetine (CYMBALTA) 60 mg delayed release capsule, Take 1 capsule by mouth 2 (two) times a day, Disp: , Rfl:     folic acid (FOLVITE) 1 mg tablet, Take 1 tablet by mouth daily, Disp: , Rfl:     furosemide (LASIX) 20 mg tablet, Take 1 tablet by mouth daily, Disp: , Rfl:     hydrALAZINE (APRESOLINE) 50 mg tablet, TAKE 1 TABLET BY MOUTH THREE TIMES DAILY, Disp: 270 tablet, Rfl: 3    insulin lispro (HUMALOG KWIKPEN) 100 Units/mL SOPN, Inject 5 Units under the skin 3 (three) times a day with meals  , Disp: , Rfl:     Lancets (ONETOUCH ULTRASOFT) lancets, by Does not apply route 4 (four) times a day  , Disp: , Rfl:     LORazepam (ATIVAN) 2 mg tablet, Take 1 tablet (2 mg total) by mouth 3 (three) times a day as needed for anxiety for up to 10 days, Disp: 30 tablet, Rfl: 0    methocarbamol (ROBAXIN) 500 mg tablet, Take 1 tablet every 8 hr  PRN for neck spasms (Patient taking differently: 500 mg 2 (two) times a day Take 1 tablet every 8 hr   PRN for neck spasms ), Disp: 21 tablet, Rfl: 0    metoprolol tartrate (LOPRESSOR) 50 mg tablet, Take 1 tablet by mouth 2 (two) times a day, Disp: , Rfl:     ONE TOUCH ULTRA TEST test strip, Use 4 times daily ( please dispense One touch Ultra test strips), Disp: 400 each, Rfl: 1   pravastatin (PRAVACHOL) 80 mg tablet, TAKE 1 TABLET BY MOUTH DAILY, Disp: 90 tablet, Rfl: 5    predniSONE 5 mg tablet, Take 1 tablet by mouth daily, Disp: , Rfl:     rOPINIRole (REQUIP) 0 25 mg tablet, Take 1 tablet by mouth 2 (two) times a day, Disp: , Rfl:     sertraline (ZOLOFT) 100 mg tablet, Take 2 tablets by mouth daily, Disp: , Rfl:     sodium bicarbonate 650 mg tablet, Take 2 tablets by mouth 2 (two) times a day  , Disp: , Rfl:     tacrolimus (PROGRAF) 1 mg capsule, Take 3 mg by mouth daily 3 mg BID , Disp: , Rfl:     TOUJEO SOLOSTAR injection pen 300 units/mL, INJECT 25 UNITS SUBCUTANEOUS AT BEDTIME (Patient taking differently: INJECT 20 UNITS SUBCUTANEOUS AT BEDTIME), Disp: 4 pen, Rfl: 3    ferrous sulfate 324 (65 Fe) mg, Take 1 tablet (324 mg total) by mouth daily before breakfast, Disp: 30 tablet, Rfl: 3    levothyroxine 112 mcg tablet, Take 1 tablet (112 mcg total) by mouth daily, Disp: 30 tablet, Rfl: 4    pantoprazole (PROTONIX) 40 mg tablet, TAKE 1 TABLET BY MOUTH EVERY DAY, Disp: 90 tablet, Rfl: 0    simethicone (MYLICON) 80 mg chewable tablet, Chew 1 tablet (80 mg total) 4 (four) times a day as needed for flatulence, Disp: 30 tablet, Rfl: 0  Review of Systems   Constitutional: Positive for fatigue  Negative for activity change, appetite change, fever and unexpected weight change  HENT: Negative  Eyes: Positive for visual disturbance  Respiratory: Negative for cough, choking, chest tightness and shortness of breath  Cardiovascular: Negative for chest pain and leg swelling  Gastrointestinal: Negative  Endocrine: Negative for cold intolerance, heat intolerance, polydipsia, polyphagia and polyuria  Musculoskeletal: Negative  Skin: Negative  Neurological: Positive for numbness  Hematological: Negative  Psychiatric/Behavioral: Negative  Physical Exam:  Body mass index is 35 87 kg/m²    /62   Pulse 74   Ht 5' 7" (1 702 m)   Wt 104 kg (229 lb)   BMI 35 87 kg/m²    Wt Readings from Last 3 Encounters:   05/01/18 104 kg (229 lb)   04/20/18 105 kg (232 lb)   02/16/18 103 kg (228 lb)       Physical Exam   Constitutional: She is oriented to person, place, and time  She appears well-developed and well-nourished  No distress  HENT:   Head: Normocephalic and atraumatic  Eyes: Conjunctivae and EOM are normal  Right eye exhibits no discharge  Left eye exhibits no discharge  Neck: Normal range of motion  Neck supple  No thyromegaly present  Cardiovascular: Normal rate, regular rhythm and normal heart sounds  No murmur heard  Pulmonary/Chest: Effort normal and breath sounds normal  No respiratory distress  She has no wheezes  Abdominal: Soft  Bowel sounds are normal    Musculoskeletal: Normal range of motion  She exhibits no edema, tenderness or deformity  Neurological: She is alert and oriented to person, place, and time  She has normal reflexes  Skin: Skin is warm and dry  She is not diaphoretic  No erythema  Psychiatric: She has a normal mood and affect  Her behavior is normal    Vitals reviewed  Diabetic Foot Exam    Labs:   No components found for: HA1C  No results found for: GLU    Lab Results   Component Value Date    CREATININE 1 81 (H) 04/13/2018    CREATININE 1 92 (H) 04/05/2018    CREATININE 1 60 (H) 02/27/2018    BUN 30 (H) 04/13/2018     04/13/2018    K 4 3 04/13/2018     (H) 04/13/2018    CO2 24 04/13/2018     eGFR   Date Value Ref Range Status   04/13/2018 31 ml/min/1 73sq m Final   09/13/2017 39 ml/min/1 73sq m Final     No components found for: [facility]    Lab Results   Component Value Date    CHOL 107 04/05/2018    HDL 32 (L) 04/05/2018    TRIG 114 04/05/2018       Lab Results   Component Value Date    ALT 26 04/13/2018    AST 21 04/13/2018    ALKPHOS 116 04/13/2018    BILITOT 0 37 04/13/2018       Lab Results   Component Value Date    FREET4 0 91 04/05/2018       Impression:  1  Acquired hypothyroidism    2   Type 1 diabetes mellitus with complication ([facility] )    3  Essential hypertension    4  Vitamin D deficiency           Plan:    Diagnoses and all orders for this visit:    Acquired hypothyroidism  TSH is slightly elevated, goal is 2-3  Will increase levothyroxine to 112 mcg daily  Educated to take it on empty stomach 1 hr before breakfast  Educated to take it 4 hr apart from vitamin-D and calcium supplementation   Repeat TSH free T4 in 6 weeks, follow-up in 4 months  -     levothyroxine 112 mcg tablet; Take 1 tablet (112 mcg total) by mouth daily  -     TSH, 3rd generation; Future  -     T4, free; Future  -     TSH, 3rd generation; Future  -     T4, free;  Future    Type 1 diabetes mellitus with complication (HCC)  Lab Results   Component Value Date    HGBA1C 5 3 2017     A1c 5 3%, which could be not reliable secondary to kidney transplant, and CKD  Lab Results   Component Value Date    CREATININE 1 81 (H) 2018    Reduce Humalog 4 units with breakfast continue 5 units with lunch and dinner, keep carbohydrate consistent with meals  Will reduce Humalog as patient is having post breakfast hypoglycemia, she continues to have low blood sugar discussed to reduce Humalog to 2 units with breakfast  Continue Toujeo 20 units at bedtime  Check blood sugars 3-4 times daily, call if blood sugar less than 70 or more than 350 persistently  Discussed follow up with Ophthalmology and podiatry  Hypoglycemia symptoms reviewed and treatment reviewed  INSULIN DOSAGE INSTRUCTIONS    Name: Terrell Weeks                        : 1965  MRN #: [id number]    Your Current Insulin  and dose is: Before Breakfast Before Lunch Before Evening Meal Bedtime   Humalog Insulin     4 units      5 units    5 units     Regular, Apidra, Humalog orNovolog Sliding Scale:   <80              151-200 + 1 +1 +1    201-250 + 2 +2 +2 +   251-300 + 3 +3 +3 +   301-350 + 4 +4 +4 +   >350 + 5 +5 +5 +       Lantus Insulin        20 units      Additional Instructions:   Please test your blood sugar:  _4_ Times per day  X_ Before Breakfast                _ Alternate Testing  X_ Before Lunch                _ 2 Hours After  Meal  X_ Before Evening Meal               _ 3 a m   x_ Before Bedtime Snack     Target Blood sugar range _80-180 mg/dl   Call if   blood sugar is less than _70_ or greater than _350 _  Today's Date: 5/1/2018       Hypoglycemia instructions   Cookie Naqvi  5/1/2018  [id number]          -     ONE TOUCH ULTRA TEST test strip; Use 4 times daily ( please dispense One touch Ultra test strips)  -     Ambulatory referral to Diabetic Education; Future  -     HEMOGLOBIN A1C W/ EAG ESTIMATION; Future  -     HEMOGLOBIN A1C W/ EAG ESTIMATION; Future  -     Basic metabolic panel; Future    Essential hypertension  BP Readings from Last 3 Encounters:   05/01/18 150/62   04/20/18 160/60   02/16/18 154/64    Continue current management  Follow up with PCP    Vitamin D deficiency  Continue vitamin-D 3, 1000 International Units daily  -     Vitamin D 25 hydroxy; Future      Discussed with the patient and all questioned fully answered  She will call me if any problems arise      Counseled patient on diagnostic results, prognosis, risk and benefit of treatment options, instruction for management, importance of treatment compliance, Risk  factor reduction and impressions      Arthur Goins MD

## 2018-05-03 DIAGNOSIS — E03.9 ACQUIRED HYPOTHYROIDISM: ICD-10-CM

## 2018-05-03 RX ORDER — LEVOTHYROXINE SODIUM 112 UG/1
112 TABLET ORAL DAILY
Qty: 90 TABLET | Refills: 0 | Status: SHIPPED | OUTPATIENT
Start: 2018-05-03 | End: 2018-06-25 | Stop reason: SDUPTHER

## 2018-05-06 DIAGNOSIS — F33.1 MAJOR DEPRESSIVE DISORDER, RECURRENT EPISODE, MODERATE (HCC): Primary | Chronic | ICD-10-CM

## 2018-05-06 DIAGNOSIS — F41.1 GAD (GENERALIZED ANXIETY DISORDER): ICD-10-CM

## 2018-05-07 ENCOUNTER — TELEPHONE (OUTPATIENT)
Dept: PSYCHIATRY | Facility: CLINIC | Age: 53
End: 2018-05-07

## 2018-05-07 DIAGNOSIS — N18.4 CKD (CHRONIC KIDNEY DISEASE), STAGE IV (HCC): Primary | ICD-10-CM

## 2018-05-07 PROBLEM — G47.09 OTHER INSOMNIA: Chronic | Status: ACTIVE | Noted: 2017-10-25

## 2018-05-07 PROBLEM — F43.12 POST-TRAUMATIC STRESS DISORDER, CHRONIC: Chronic | Status: ACTIVE | Noted: 2017-10-25

## 2018-05-07 RX ORDER — DOXAZOSIN MESYLATE 1 MG/1
1 TABLET ORAL
Qty: 90 TABLET | Refills: 4 | Status: SHIPPED | OUTPATIENT
Start: 2018-05-07 | End: 2018-05-21 | Stop reason: SDUPTHER

## 2018-05-07 RX ORDER — FUROSEMIDE 20 MG/1
20 TABLET ORAL DAILY
Qty: 90 TABLET | Refills: 4 | Status: SHIPPED | OUTPATIENT
Start: 2018-05-07 | End: 2019-07-20 | Stop reason: SDUPTHER

## 2018-05-07 RX ORDER — SERTRALINE HYDROCHLORIDE 100 MG/1
200 TABLET, FILM COATED ORAL DAILY
Qty: 180 TABLET | Refills: 0 | Status: SHIPPED | OUTPATIENT
Start: 2018-05-07 | End: 2018-08-05 | Stop reason: SDUPTHER

## 2018-05-08 RX ORDER — DOXAZOSIN MESYLATE 1 MG/1
TABLET ORAL
Qty: 90 TABLET | Refills: 0 | OUTPATIENT
Start: 2018-05-08

## 2018-05-09 ENCOUNTER — APPOINTMENT (OUTPATIENT)
Dept: LAB | Age: 53
End: 2018-05-09
Payer: MEDICARE

## 2018-05-09 ENCOUNTER — TRANSCRIBE ORDERS (OUTPATIENT)
Dept: ADMINISTRATIVE | Age: 53
End: 2018-05-09

## 2018-05-09 DIAGNOSIS — Z94.0 KIDNEY REPLACED BY TRANSPLANT: ICD-10-CM

## 2018-05-09 DIAGNOSIS — Z94.0 KIDNEY REPLACED BY TRANSPLANT: Primary | ICD-10-CM

## 2018-05-09 LAB
ANION GAP SERPL CALCULATED.3IONS-SCNC: 7 MMOL/L (ref 4–13)
BASOPHILS # BLD AUTO: 0.05 THOUSANDS/ΜL (ref 0–0.1)
BASOPHILS NFR BLD AUTO: 1 % (ref 0–1)
BUN SERPL-MCNC: 31 MG/DL (ref 5–25)
CALCIUM SERPL-MCNC: 9.1 MG/DL (ref 8.3–10.1)
CHLORIDE SERPL-SCNC: 109 MMOL/L (ref 100–108)
CO2 SERPL-SCNC: 23 MMOL/L (ref 21–32)
CREAT SERPL-MCNC: 1.79 MG/DL (ref 0.6–1.3)
CREAT UR-MCNC: 40.6 MG/DL
EOSINOPHIL # BLD AUTO: 0.24 THOUSAND/ΜL (ref 0–0.61)
EOSINOPHIL NFR BLD AUTO: 3 % (ref 0–6)
ERYTHROCYTE [DISTWIDTH] IN BLOOD BY AUTOMATED COUNT: 20.4 % (ref 11.6–15.1)
GFR SERPL CREATININE-BSD FRML MDRD: 32 ML/MIN/1.73SQ M
GLUCOSE P FAST SERPL-MCNC: 43 MG/DL (ref 65–99)
HCT VFR BLD AUTO: 29.6 % (ref 34.8–46.1)
HGB BLD-MCNC: 8.9 G/DL (ref 11.5–15.4)
LYMPHOCYTES # BLD AUTO: 1.52 THOUSANDS/ΜL (ref 0.6–4.47)
LYMPHOCYTES NFR BLD AUTO: 19 % (ref 14–44)
MAGNESIUM SERPL-MCNC: 2.3 MG/DL (ref 1.6–2.6)
MCH RBC QN AUTO: 25.1 PG (ref 26.8–34.3)
MCHC RBC AUTO-ENTMCNC: 30.1 G/DL (ref 31.4–37.4)
MCV RBC AUTO: 84 FL (ref 82–98)
MONOCYTES # BLD AUTO: 0.52 THOUSAND/ΜL (ref 0.17–1.22)
MONOCYTES NFR BLD AUTO: 7 % (ref 4–12)
NEUTROPHILS # BLD AUTO: 5.44 THOUSANDS/ΜL (ref 1.85–7.62)
NEUTS SEG NFR BLD AUTO: 70 % (ref 43–75)
NRBC BLD AUTO-RTO: 0 /100 WBCS
PHOSPHATE SERPL-MCNC: 3.7 MG/DL (ref 2.7–4.5)
PLATELET # BLD AUTO: 264 THOUSANDS/UL (ref 149–390)
POTASSIUM SERPL-SCNC: 4.1 MMOL/L (ref 3.5–5.3)
PROT UR-MCNC: 64 MG/DL
PROT/CREAT UR: 1.58 MG/G{CREAT} (ref 0–0.1)
RBC # BLD AUTO: 3.54 MILLION/UL (ref 3.81–5.12)
SODIUM SERPL-SCNC: 139 MMOL/L (ref 136–145)
WBC # BLD AUTO: 7.87 THOUSAND/UL (ref 4.31–10.16)

## 2018-05-09 PROCEDURE — 83735 ASSAY OF MAGNESIUM: CPT

## 2018-05-09 PROCEDURE — 84100 ASSAY OF PHOSPHORUS: CPT

## 2018-05-09 PROCEDURE — 84156 ASSAY OF PROTEIN URINE: CPT | Performed by: INTERNAL MEDICINE

## 2018-05-09 PROCEDURE — 82570 ASSAY OF URINE CREATININE: CPT | Performed by: INTERNAL MEDICINE

## 2018-05-09 PROCEDURE — 80048 BASIC METABOLIC PNL TOTAL CA: CPT

## 2018-05-09 PROCEDURE — 85025 COMPLETE CBC W/AUTO DIFF WBC: CPT

## 2018-05-09 PROCEDURE — 36415 COLL VENOUS BLD VENIPUNCTURE: CPT

## 2018-05-09 PROCEDURE — 80197 ASSAY OF TACROLIMUS: CPT

## 2018-05-10 LAB — TACROLIMUS BLD-MCNC: 4.6 NG/ML (ref 2–20)

## 2018-05-16 ENCOUNTER — TELEPHONE (OUTPATIENT)
Dept: NEPHROLOGY | Facility: CLINIC | Age: 53
End: 2018-05-16

## 2018-05-16 ENCOUNTER — TELEPHONE (OUTPATIENT)
Dept: ENDOCRINOLOGY | Facility: CLINIC | Age: 53
End: 2018-05-16

## 2018-05-16 NOTE — TELEPHONE ENCOUNTER
DM order form completed and faxed to [employer]  How Severe Are Your Spot(S)?: mild What Is The Reason For Today's Visit?: Full Body Skin Examination What Is The Reason For Today's Visit? (Being Monitored For X): concerning skin lesions on an annual basis

## 2018-05-16 NOTE — PROGRESS NOTES
Lower review with the patient next week  Tacrolimus level appropriate  Creatinine stable  Hemoglobin low  Will review with Hematology team regarding IV iron  Protein creatinine ratio relatively stable and slightly improved from 3 months ago

## 2018-05-16 NOTE — TELEPHONE ENCOUNTER
Reviewed with Hematology  Okay with IV iron  Will discuss with the patient next week at the appointment  Will set the patient up with iron if patient agrees  Goal hemoglobin 9-10  If decreases per Hematology, we will consider Aranesp 100 mcg once monthly

## 2018-05-18 ENCOUNTER — TELEPHONE (OUTPATIENT)
Dept: ENDOCRINOLOGY | Facility: CLINIC | Age: 53
End: 2018-05-18

## 2018-05-21 ENCOUNTER — OFFICE VISIT (OUTPATIENT)
Dept: NEPHROLOGY | Facility: CLINIC | Age: 53
End: 2018-05-21
Payer: MEDICARE

## 2018-05-21 VITALS
HEIGHT: 67 IN | SYSTOLIC BLOOD PRESSURE: 142 MMHG | BODY MASS INDEX: 35.47 KG/M2 | DIASTOLIC BLOOD PRESSURE: 78 MMHG | WEIGHT: 226 LBS | HEART RATE: 76 BPM

## 2018-05-21 DIAGNOSIS — D50.9 IRON DEFICIENCY ANEMIA, UNSPECIFIED IRON DEFICIENCY ANEMIA TYPE: ICD-10-CM

## 2018-05-21 DIAGNOSIS — R09.89 BRUIT OF LEFT CAROTID ARTERY: ICD-10-CM

## 2018-05-21 DIAGNOSIS — I70.1 RENAL ARTERY STENOSIS (HCC): Primary | ICD-10-CM

## 2018-05-21 DIAGNOSIS — I10 BENIGN ESSENTIAL HTN: ICD-10-CM

## 2018-05-21 DIAGNOSIS — N18.4 CKD (CHRONIC KIDNEY DISEASE), STAGE IV (HCC): ICD-10-CM

## 2018-05-21 PROCEDURE — 99215 OFFICE O/P EST HI 40 MIN: CPT | Performed by: INTERNAL MEDICINE

## 2018-05-21 RX ORDER — AMLODIPINE BESYLATE 10 MG/1
TABLET ORAL
Qty: 45 TABLET | Refills: 3 | Status: SHIPPED | OUTPATIENT
Start: 2018-05-21 | End: 2018-08-14 | Stop reason: SDUPTHER

## 2018-05-21 RX ORDER — DOXAZOSIN MESYLATE 1 MG/1
2 TABLET ORAL
Qty: 180 TABLET | Refills: 3 | Status: SHIPPED | OUTPATIENT
Start: 2018-05-21 | End: 2018-06-26 | Stop reason: SDUPTHER

## 2018-05-21 NOTE — PATIENT INSTRUCTIONS
1) do your labwork monthly; do your ultrasounds prior to next visit  2) increase your doxazosin to 2 mg nightly  3) continue daily BP check  4) we will set up IV iron infusion for you and you will be called     5) please see the GI doctor

## 2018-05-21 NOTE — PROGRESS NOTES
NEPHROLOGY OFFICE VISIT   Karma Mcmahon 46 y o  female MRN: [id number]  5/21/2018    Reason for Visit: renal transplant, CKD III    ASSESSMENT and PLAN:    I had the pleasure of seeing Ms Donn Brito today in the renal clinic for the continued management of CKD, renal transplant  Since our last visit, there has been no ER visits or hospitalizations  He currently has no complaints at this time and is feeling well  Patient denies any chest pain, shortness of breath and swelling  The last blood work was done in May, which we have reviewed together  48 yo female with PMHx of renal pancreas transplant 1998, DM I, failed pancreas transplant 2017, HTN, recurrent UTI/pyelo, recurrent, 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, Did require dialysis in 2014, orthostasis, anemia, hypothyroid, 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, dCHF, Presents for follow-up visit for renal transplant    Patient has had multiple admissions to the hospital since last being seen  9/2017 -acute kidney injury, urinary frequency  Treated initially for UTI     10/2017 -urinary incontinence  Was started on antibiotics for possible UTI  Was started on Bactrim prophylaxis  11/2017 -abdominal pain  Nausea  Confusion  Depression and anxiety  Patient had EGD which showed gastritis  Given volume expansion  2/2018-depression and anxiety  Treated for UTI also  1) Renal transplant - Baseline Cr 1 6 - 1 9 mg/dL  recurrent UTI/pyelo, recurrent episodes of EDUARDO due to volume depletion  BK negative  Has history of renal artery stenosis  - Cr 1 79, UPCR stable 1 58 g/g  - follow renal function closely     - advised to drink 2 L fluids daily  - has renal artery stenosis > 60%     2) Immunosuppression - given Hematological issues, goal tac lower end 4-5    - tac was reduced to 3 mg BID - level 4 6    - Continue prednisone 5 mg  - continue labwork check and current labwork    3) HTN - history of orthostasis and low diast BP  widened pulse pressure possible due to aortic valve regurg? needs afterload reduction    - BP at home elevated 315-252 systolic  - today 727Y in office  - amlodipine 10 mg in AM and 5 mg in PM, clonidine 0 3 TID, doxazosin 1 mg nightly, metoprolol 50 BID, hydralazine 50 TID  - increase doxazosin to 2 mg nightly  - check renal art doplar of transplanted kidney    4) Anemia -     - prior fec occ positive  - had EGD prior admission with gastritis  - iron sat low -  - also set up for IV iron infusions - 5 infusions of 200 mg  - prior to micera or LEO, will need to review with Hematology given history of paraproteinemia as above  5) recurrent UTI/pyelo - as above; completed monosat  6) Vaccine - should stay up to date on innactivated vaccinations    7) Lipids - as per PCP with regards to lipid check    8) BMD - DEXA due 2017  phos 4 6  Last PTH 41      - monitor for now    9) proteinuria - relatively stable  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  8) age appropriate ca screening - needs to remain up to date with mammogram an colonoscopy (next c scope is in 8/2018)    - needs to f/u with Derm Yearly - pt will call to make appt  - given GI referral as patient is due this year for c scope and jony given prior positive fec occ    11) depression - Patient states depression is well-controlled with the current regimen  - sertraline can interact with tac  Tac level stable  - pt is on 4-5 differently anti psych/anti depressants  Pt will discuss with Psych team tomorrow at appt regarding polypharmacy     12) question of renal art stenosis - We'll continue to monitor for now  If blood pressures worsen her renal function worsens rapidly may need intervention    - have not placed on ACE or ARB   - check u/s    13) MGUS/IgG Kappa - follows with Hematology/Oncology     14) Fatigue - prior TSH nl  Unclear etiology  EBV IgM neg, BK PCR also  improved    15) hyponatremia - improved  16) pancreas transplant -     - failed pancreas allograft - following with Endocrine    17) Volume - relatively euvolemic, but has gained weight and trace edema LE    - cont furosemide    It was a pleasure of evaluating Ms Kathrene Romberg in the renal office  Please do not hesitate to contact our team if further issues or questions arise in the interim  No problem-specific Assessment & Plan notes found for this encounter  HPI:    Ms Kathrene Romberg presents for f/u visit  No recent fevers, chills, nausea, vomiting  PATIENT INSTRUCTIONS:    There are no Patient Instructions on file for this visit  OBJECTIVE:  Current Weight:    There were no vitals filed for this visit  There is no height or weight on file to calculate BMI  REVIEW OF SYSTEMS:    Review of Systems   Constitutional: Negative  Negative for fatigue  HENT: Negative  Eyes: Negative  Respiratory: Negative  Negative for shortness of breath  Cardiovascular: Negative  Negative for leg swelling  Gastrointestinal: Negative  Endocrine: Negative  Genitourinary: Negative  Negative for difficulty urinating  Musculoskeletal: Negative  Skin: Negative  Allergic/Immunologic: Negative  Neurological: Negative  Hematological: Negative  Psychiatric/Behavioral: Negative  All other systems reviewed and are negative  PHYSICAL EXAM:      Physical Exam   Constitutional: She is oriented to person, place, and time  She appears well-developed and well-nourished  No distress  HENT:   Head: Normocephalic and atraumatic  Mouth/Throat: No oropharyngeal exudate  Eyes: Conjunctivae are normal  Right eye exhibits no discharge  Left eye exhibits no discharge  No scleral icterus  Neck: Normal range of motion  Neck supple  No JVD present  Cardiovascular: Normal rate and regular rhythm  Exam reveals no gallop and no friction rub  No murmur heard    Pulmonary/Chest: Effort normal and breath sounds normal  No respiratory distress  She has no wheezes  She has no rales  Abdominal: Soft  Bowel sounds are normal  She exhibits no distension  There is no tenderness  There is no rebound  Musculoskeletal: Normal range of motion  She exhibits no edema, tenderness or deformity  Neurological: She is alert and oriented to person, place, and time  Coordination normal    Skin: Skin is warm and dry  No rash noted  She is not diaphoretic  No erythema  No pallor  Psychiatric: She has a normal mood and affect  Her behavior is normal  Judgment and thought content normal    Nursing note and vitals reviewed        Medications:    Current Outpatient Prescriptions:     amLODIPine (NORVASC) 10 mg tablet, Take 10 mg by mouth 3 (three) times a day 10mg in the morning & 5mg in the evening , Disp: , Rfl:     ARIPiprazole (ABILIFY) 30 mg tablet, Take 1 tablet by mouth, Disp: , Rfl:     aspirin 81 MG tablet, Take 1 tablet by mouth daily, Disp: , Rfl:     BD PEN NEEDLE VISHNU U/F 32G X 4 MM MISC, USE FOUR TIMES DAILY, Disp: 90 each, Rfl: 0    busPIRone (BUSPAR) 5 mg tablet, Take 1 tablet by mouth 2 (two) times a day for 180 days, Disp: 180 tablet, Rfl: 1    Cholecalciferol (VITAMIN D3) 1000 units CAPS, Take by mouth, Disp: , Rfl:     cloNIDine (CATAPRES) 0 1 mg tablet, Take 0 3 mg by mouth every 8 (eight) hours  , Disp: , Rfl:     doxazosin (CARDURA) 1 mg tablet, Take 1 tablet by mouth, Disp: , Rfl:     doxazosin (CARDURA) 1 mg tablet, Take 1 tablet (1 mg total) by mouth daily at bedtime, Disp: 90 tablet, Rfl: 4    DULoxetine (CYMBALTA) 60 mg delayed release capsule, Take 1 capsule by mouth 2 (two) times a day, Disp: , Rfl:     ferrous sulfate 324 (65 Fe) mg, Take 1 tablet (324 mg total) by mouth daily before breakfast, Disp: 30 tablet, Rfl: 3    folic acid (FOLVITE) 1 mg tablet, Take 1 tablet by mouth daily, Disp: , Rfl:     furosemide (LASIX) 20 mg tablet, Take 1 tablet (20 mg total) by mouth daily, Disp: 90 tablet, Rfl: 4    hydrALAZINE (APRESOLINE) 50 mg tablet, TAKE 1 TABLET BY MOUTH THREE TIMES DAILY, Disp: 270 tablet, Rfl: 3    insulin lispro (HUMALOG KWIKPEN) 100 Units/mL SOPN, Inject 5 Units under the skin 3 (three) times a day with meals  , Disp: , Rfl:     Lancets (ONETOUCH ULTRASOFT) lancets, by Does not apply route 4 (four) times a day  , Disp: , Rfl:     levothyroxine 112 mcg tablet, Take 1 tablet (112 mcg total) by mouth daily, Disp: 90 tablet, Rfl: 0    LORazepam (ATIVAN) 2 mg tablet, Take 1 tablet (2 mg total) by mouth 3 (three) times a day as needed for anxiety for up to 10 days, Disp: 30 tablet, Rfl: 0    methocarbamol (ROBAXIN) 500 mg tablet, Take 1 tablet every 8 hr  PRN for neck spasms (Patient taking differently: 500 mg 2 (two) times a day Take 1 tablet every 8 hr   PRN for neck spasms ), Disp: 21 tablet, Rfl: 0    metoprolol tartrate (LOPRESSOR) 50 mg tablet, Take 1 tablet by mouth 2 (two) times a day, Disp: , Rfl:     ONE TOUCH ULTRA TEST test strip, Use 4 times daily ( please dispense One touch Ultra test strips), Disp: 400 each, Rfl: 1    pantoprazole (PROTONIX) 40 mg tablet, TAKE 1 TABLET BY MOUTH EVERY DAY, Disp: 90 tablet, Rfl: 0    pravastatin (PRAVACHOL) 80 mg tablet, TAKE 1 TABLET BY MOUTH DAILY, Disp: 90 tablet, Rfl: 5    predniSONE 5 mg tablet, Take 1 tablet by mouth daily, Disp: , Rfl:     rOPINIRole (REQUIP) 0 25 mg tablet, Take 1 tablet by mouth 2 (two) times a day, Disp: , Rfl:     sertraline (ZOLOFT) 100 mg tablet, Take 2 tablets (200 mg total) by mouth daily for 90 days, Disp: 180 tablet, Rfl: 0    simethicone (MYLICON) 80 mg chewable tablet, Chew 1 tablet (80 mg total) 4 (four) times a day as needed for flatulence, Disp: 30 tablet, Rfl: 0    sodium bicarbonate 650 mg tablet, Take 2 tablets by mouth 2 (two) times a day  , Disp: , Rfl:     tacrolimus (PROGRAF) 1 mg capsule, Take 3 mg by mouth daily 3 mg BID , Disp: , Rfl:     TOUJEO SOLOSTAR injection pen 300 units/mL, INJECT 25 UNITS SUBCUTANEOUS AT BEDTIME (Patient taking differently: INJECT 20 UNITS SUBCUTANEOUS AT BEDTIME), Disp: 4 pen, Rfl: 3    Laboratory Results:        Results for orders placed or performed in visit on 05/09/18   CBC and differential   Result Value Ref Range    WBC 7 87 4 31 - 10 16 Thousand/uL    RBC 3 54 (L) 3 81 - 5 12 Million/uL    Hemoglobin 8 9 (L) 11 5 - 15 4 g/dL    Hematocrit 29 6 (L) 34 8 - 46 1 %    MCV 84 82 - 98 fL    MCH 25 1 (L) 26 8 - 34 3 pg    MCHC 30 1 (L) 31 4 - 37 4 g/dL    RDW 20 4 (H) 11 6 - 15 1 %    Platelets 656 510 - 795 Thousands/uL    nRBC 0 /100 WBCs    Neutrophils Relative 70 43 - 75 %    Lymphocytes Relative 19 14 - 44 %    Monocytes Relative 7 4 - 12 %    Eosinophils Relative 3 0 - 6 %    Basophils Relative 1 0 - 1 %    Neutrophils Absolute 5 44 1 85 - 7 62 Thousands/µL    Lymphocytes Absolute 1 52 0 60 - 4 47 Thousands/µL    Monocytes Absolute 0 52 0 17 - 1 22 Thousand/µL    Eosinophils Absolute 0 24 0 00 - 0 61 Thousand/µL    Basophils Absolute 0 05 0 00 - 0 10 Thousands/µL   Tacrolimus level   Result Value Ref Range    TACROLIMUS 4 6 2 0 - 20 0 ng/mL   Magnesium   Result Value Ref Range    Magnesium 2 3 1 6 - 2 6 mg/dL   Basic metabolic panel   Result Value Ref Range    Sodium 139 136 - 145 mmol/L    Potassium 4 1 3 5 - 5 3 mmol/L    Chloride 109 (H) 100 - 108 mmol/L    CO2 23 21 - 32 mmol/L    Anion Gap 7 4 - 13 mmol/L    BUN 31 (H) 5 - 25 mg/dL    Creatinine 1 79 (H) 0 60 - 1 30 mg/dL    Glucose, Fasting 43 (L) 65 - 99 mg/dL    Calcium 9 1 8 3 - 10 1 mg/dL    eGFR 32 ml/min/1 73sq m   Phosphorus   Result Value Ref Range    Phosphorus 3 7 2 7 - 4 5 mg/dL

## 2018-05-21 NOTE — LETTER
May 21, 2018     [address], [address]    Patient: Cookie Naqvi   YOB: 1965   Date of Visit: 5/21/2018       Dear Dr Cherelle George:    Thank you for referring Cookie Naqvi to me for evaluation  Below are my notes for this consultation  If you have questions, please do not hesitate to call me  I look forward to following your patient along with you  Sincerely,        Wyatt Medina MD        CC: MD Wyatt Nye MD  5/21/2018 10:17 AM  Sign at close encounter  [address] [facility] 46 y o  female MRN: [id number]  5/21/2018    Reason for Visit: renal transplant, CKD III    ASSESSMENT and PLAN:    I had the pleasure of seeing Ms Jocelyn Elizabeth today in the renal clinic for the continued management of CKD, renal transplant  Since our last visit, there has been no ER visits or hospitalizations  He currently has no complaints at this time and is feeling well  Patient denies any chest pain, shortness of breath and swelling  The last blood work was done in May, which we have reviewed together  46 yo female with PMHx of renal pancreas transplant 1998, DM I, failed pancreas transplant 2017, HTN, recurrent UTI/pyelo, recurrent, 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, Did require dialysis in 2014, orthostasis, anemia, hypothyroid, 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, dCHF, Presents for follow-up visit for renal transplant    Patient has had multiple admissions to the hospital since last being seen  9/2017 -acute kidney injury, urinary frequency  Treated initially for UTI     10/2017 -urinary incontinence  Was started on antibiotics for possible UTI  Was started on Bactrim prophylaxis  11/2017 -abdominal pain  Nausea  Confusion  Depression and anxiety  Patient had EGD which showed gastritis  Given volume expansion  2/2018-depression and anxiety    Treated for UTI also  1) Renal transplant - Baseline Cr 1 6 - 1 9 mg/dL  recurrent UTI/pyelo, recurrent episodes of EDUARDO due to volume depletion  BK negative  Has history of renal artery stenosis  - Cr 1 79, UPCR stable 1 58 g/g  - follow renal function closely  - advised to drink 2 L fluids daily  - has renal artery stenosis > 60%     2) Immunosuppression - given Hematological issues, goal tac lower end 4-5    - tac was reduced to 3 mg BID - level 4 6    - Continue prednisone 5 mg  - continue labwork check and current labwork    3) HTN - history of orthostasis and low diast BP  widened pulse pressure possible due to aortic valve regurg? needs afterload reduction    - BP at home elevated 015-670 systolic  - today 874D in office  - amlodipine 10 mg in AM and 5 mg in PM, clonidine 0 3 TID, doxazosin 1 mg nightly, metoprolol 50 BID, hydralazine 50 TID  - increase doxazosin to 2 mg nightly  - check renal art doplar of transplanted kidney    4) Anemia -     - prior fec occ positive  - had EGD prior admission with gastritis  - iron sat low -  - also set up for IV iron infusions - 5 infusions of 200 mg  - prior to micera or LEO, will need to review with Hematology given history of paraproteinemia as above  5) recurrent UTI/pyelo - as above; completed monosat  6) Vaccine - should stay up to date on innactivated vaccinations    7) Lipids - as per PCP with regards to lipid check    8) BMD - DEXA due 2017  phos 4 6  Last PTH 41      - monitor for now    9) proteinuria - relatively stable  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     12) question of renal art stenosis - We'll continue to monitor for now  If blood pressures worsen her renal function worsens rapidly may need intervention    - have not placed on ACE or ARB   - check u/s    13) MGUS/IgG Kappa - follows with Hematology/Oncology     14) Fatigue - prior TSH nl  Unclear etiology  EBV IgM neg, BK PCR also  improved    15) hyponatremia - improved  16) pancreas transplant -     - failed pancreas allograft - following with Endocrine    17) Volume - relatively euvolemic, but has gained weight and trace edema LE    - cont furosemide    It was a pleasure of evaluating Ms Oralia Grijalva in the renal office  Please do not hesitate to contact our team if further issues or questions arise in the interim  No problem-specific Assessment & Plan notes found for this encounter  HPI:    Ms Oralia Grijalva presents for f/u visit  No recent fevers, chills, nausea, vomiting  PATIENT INSTRUCTIONS:    There are no Patient Instructions on file for this visit  OBJECTIVE:  Current Weight:    There were no vitals filed for this visit  There is no height or weight on file to calculate BMI  REVIEW OF SYSTEMS:    Review of Systems   Constitutional: Negative  Negative for fatigue  HENT: Negative  Eyes: Negative  Respiratory: Negative  Negative for shortness of breath  Cardiovascular: Negative  Negative for leg swelling  Gastrointestinal: Negative  Endocrine: Negative  Genitourinary: Negative  Negative for difficulty urinating  Musculoskeletal: Negative  Skin: Negative  Allergic/Immunologic: Negative  Neurological: Negative  Hematological: Negative  Psychiatric/Behavioral: Negative  All other systems reviewed and are negative        PHYSICAL EXAM:      Physical Exam   Constitutional: She is oriented to person, place, and time  She appears well-developed and well-nourished  No distress  HENT:   Head: Normocephalic and atraumatic  Mouth/Throat: No oropharyngeal exudate  Eyes: Conjunctivae are normal  Right eye exhibits no discharge  Left eye exhibits no discharge  No scleral icterus  Neck: Normal range of motion  Neck supple  No JVD present  Cardiovascular: Normal rate and regular rhythm  Exam reveals no gallop and no friction rub  No murmur heard  Pulmonary/Chest: Effort normal and breath sounds normal  No respiratory distress  She has no wheezes  She has no rales  Abdominal: Soft  Bowel sounds are normal  She exhibits no distension  There is no tenderness  There is no rebound  Musculoskeletal: Normal range of motion  She exhibits no edema, tenderness or deformity  Neurological: She is alert and oriented to person, place, and time  Coordination normal    Skin: Skin is warm and dry  No rash noted  She is not diaphoretic  No erythema  No pallor  Psychiatric: She has a normal mood and affect  Her behavior is normal  Judgment and thought content normal    Nursing note and vitals reviewed        Medications:    Current Outpatient Prescriptions:     amLODIPine (NORVASC) 10 mg tablet, Take 10 mg by mouth 3 (three) times a day 10mg in the morning & 5mg in the evening , Disp: , Rfl:     ARIPiprazole (ABILIFY) 30 mg tablet, Take 1 tablet by mouth, Disp: , Rfl:     aspirin 81 MG tablet, Take 1 tablet by mouth daily, Disp: , Rfl:     BD PEN NEEDLE VISHNU U/F 32G X 4 MM MISC, USE FOUR TIMES DAILY, Disp: 90 each, Rfl: 0    busPIRone (BUSPAR) 5 mg tablet, Take 1 tablet by mouth 2 (two) times a day for 180 days, Disp: 180 tablet, Rfl: 1    Cholecalciferol (VITAMIN D3) 1000 units CAPS, Take by mouth, Disp: , Rfl:     cloNIDine (CATAPRES) 0 1 mg tablet, Take 0 3 mg by mouth every 8 (eight) hours  , Disp: , Rfl:     doxazosin (CARDURA) 1 mg tablet, Take 1 tablet by mouth, Disp: , Rfl:   doxazosin (CARDURA) 1 mg tablet, Take 1 tablet (1 mg total) by mouth daily at bedtime, Disp: 90 tablet, Rfl: 4    DULoxetine (CYMBALTA) 60 mg delayed release capsule, Take 1 capsule by mouth 2 (two) times a day, Disp: , Rfl:     ferrous sulfate 324 (65 Fe) mg, Take 1 tablet (324 mg total) by mouth daily before breakfast, Disp: 30 tablet, Rfl: 3    folic acid (FOLVITE) 1 mg tablet, Take 1 tablet by mouth daily, Disp: , Rfl:     furosemide (LASIX) 20 mg tablet, Take 1 tablet (20 mg total) by mouth daily, Disp: 90 tablet, Rfl: 4    hydrALAZINE (APRESOLINE) 50 mg tablet, TAKE 1 TABLET BY MOUTH THREE TIMES DAILY, Disp: 270 tablet, Rfl: 3    insulin lispro (HUMALOG KWIKPEN) 100 Units/mL SOPN, Inject 5 Units under the skin 3 (three) times a day with meals  , Disp: , Rfl:     Lancets (ONETOUCH ULTRASOFT) lancets, by Does not apply route 4 (four) times a day  , Disp: , Rfl:     levothyroxine 112 mcg tablet, Take 1 tablet (112 mcg total) by mouth daily, Disp: 90 tablet, Rfl: 0    LORazepam (ATIVAN) 2 mg tablet, Take 1 tablet (2 mg total) by mouth 3 (three) times a day as needed for anxiety for up to 10 days, Disp: 30 tablet, Rfl: 0    methocarbamol (ROBAXIN) 500 mg tablet, Take 1 tablet every 8 hr  PRN for neck spasms (Patient taking differently: 500 mg 2 (two) times a day Take 1 tablet every 8 hr   PRN for neck spasms ), Disp: 21 tablet, Rfl: 0    metoprolol tartrate (LOPRESSOR) 50 mg tablet, Take 1 tablet by mouth 2 (two) times a day, Disp: , Rfl:     ONE TOUCH ULTRA TEST test strip, Use 4 times daily ( please dispense One touch Ultra test strips), Disp: 400 each, Rfl: 1    pantoprazole (PROTONIX) 40 mg tablet, TAKE 1 TABLET BY MOUTH EVERY DAY, Disp: 90 tablet, Rfl: 0    pravastatin (PRAVACHOL) 80 mg tablet, TAKE 1 TABLET BY MOUTH DAILY, Disp: 90 tablet, Rfl: 5    predniSONE 5 mg tablet, Take 1 tablet by mouth daily, Disp: , Rfl:     rOPINIRole (REQUIP) 0 25 mg tablet, Take 1 tablet by mouth 2 (two) times a day, Disp: , Rfl:     sertraline (ZOLOFT) 100 mg tablet, Take 2 tablets (200 mg total) by mouth daily for 90 days, Disp: 180 tablet, Rfl: 0    simethicone (MYLICON) 80 mg chewable tablet, Chew 1 tablet (80 mg total) 4 (four) times a day as needed for flatulence, Disp: 30 tablet, Rfl: 0    sodium bicarbonate 650 mg tablet, Take 2 tablets by mouth 2 (two) times a day  , Disp: , Rfl:     tacrolimus (PROGRAF) 1 mg capsule, Take 3 mg by mouth daily 3 mg BID , Disp: , Rfl:     TOUJEO SOLOSTAR injection pen 300 units/mL, INJECT 25 UNITS SUBCUTANEOUS AT BEDTIME (Patient taking differently: INJECT 20 UNITS SUBCUTANEOUS AT BEDTIME), Disp: 4 pen, Rfl: 3    Laboratory Results:        Results for orders placed or performed in visit on 05/09/18   CBC and differential   Result Value Ref Range    WBC 7 87 4 31 - 10 16 Thousand/uL    RBC 3 54 (L) 3 81 - 5 12 Million/uL    Hemoglobin 8 9 (L) 11 5 - 15 4 g/dL    Hematocrit 29 6 (L) 34 8 - 46 1 %    MCV 84 82 - 98 fL    MCH 25 1 (L) 26 8 - 34 3 pg    MCHC 30 1 (L) 31 4 - 37 4 g/dL    RDW 20 4 (H) 11 6 - 15 1 %    Platelets 460 946 - 566 Thousands/uL    nRBC 0 /100 WBCs    Neutrophils Relative 70 43 - 75 %    Lymphocytes Relative 19 14 - 44 %    Monocytes Relative 7 4 - 12 %    Eosinophils Relative 3 0 - 6 %    Basophils Relative 1 0 - 1 %    Neutrophils Absolute 5 44 1 85 - 7 62 Thousands/µL    Lymphocytes Absolute 1 52 0 60 - 4 47 Thousands/µL    Monocytes Absolute 0 52 0 17 - 1 22 Thousand/µL    Eosinophils Absolute 0 24 0 00 - 0 61 Thousand/µL    Basophils Absolute 0 05 0 00 - 0 10 Thousands/µL   Tacrolimus level   Result Value Ref Range    TACROLIMUS 4 6 2 0 - 20 0 ng/mL   Magnesium   Result Value Ref Range    Magnesium 2 3 1 6 - 2 6 mg/dL   Basic metabolic panel   Result Value Ref Range    Sodium 139 136 - 145 mmol/L    Potassium 4 1 3 5 - 5 3 mmol/L    Chloride 109 (H) 100 - 108 mmol/L    CO2 23 21 - 32 mmol/L    Anion Gap 7 4 - 13 mmol/L    BUN 31 (H) 5 - 25 mg/dL Creatinine 1 79 (H) 0 60 - 1 30 mg/dL    Glucose, Fasting 43 (L) 65 - 99 mg/dL    Calcium 9 1 8 3 - 10 1 mg/dL    eGFR 32 ml/min/1 73sq m   Phosphorus   Result Value Ref Range    Phosphorus 3 7 2 7 - 4 5 mg/dL

## 2018-05-22 ENCOUNTER — TELEPHONE (OUTPATIENT)
Dept: PSYCHIATRY | Facility: CLINIC | Age: 53
End: 2018-05-22

## 2018-05-22 DIAGNOSIS — E11.8 UNCONTROLLED TYPE 2 DIABETES MELLITUS WITH COMPLICATION, UNSPECIFIED WHETHER LONG TERM INSULIN USE: Primary | ICD-10-CM

## 2018-05-22 DIAGNOSIS — E11.65 UNCONTROLLED TYPE 2 DIABETES MELLITUS WITH COMPLICATION, UNSPECIFIED WHETHER LONG TERM INSULIN USE: Primary | ICD-10-CM

## 2018-05-29 ENCOUNTER — TRANSCRIBE ORDERS (OUTPATIENT)
Dept: ADMINISTRATIVE | Facility: HOSPITAL | Age: 53
End: 2018-05-29

## 2018-05-29 DIAGNOSIS — Z12.39 SCREENING BREAST EXAMINATION: Primary | ICD-10-CM

## 2018-05-30 ENCOUNTER — DOCUMENTATION (OUTPATIENT)
Dept: PSYCHIATRY | Facility: CLINIC | Age: 53
End: 2018-05-30

## 2018-05-30 NOTE — PROGRESS NOTES
Treatment Plan Tracking    # 1Treatment Plan not completed within required time limits due to: cancelled scheduled appointment 5/22/18  Gisella Corona        Next schedule appointment 10/15/2018     Tx Plan Due by 5/25/2018

## 2018-05-31 ENCOUNTER — TELEPHONE (OUTPATIENT)
Dept: ENDOCRINOLOGY | Facility: CLINIC | Age: 53
End: 2018-05-31

## 2018-05-31 NOTE — TELEPHONE ENCOUNTER
toujeo 20u qhs  humalog 5 units with meals   blood sugars related really well controlled   continue current regimen   please inform patient    Keyonna Jain MD

## 2018-06-05 ENCOUNTER — TELEPHONE (OUTPATIENT)
Dept: HEMATOLOGY ONCOLOGY | Facility: CLINIC | Age: 53
End: 2018-06-05

## 2018-06-05 NOTE — TELEPHONE ENCOUNTER
Pt called because does not have lab slips for next OV  Uses [facility]  Informed labs are now in computer and does not paper slips

## 2018-06-07 ENCOUNTER — HOSPITAL ENCOUNTER (OUTPATIENT)
Dept: INFUSION CENTER | Facility: HOSPITAL | Age: 53
Discharge: HOME | End: 2018-06-07
Payer: MEDICARE

## 2018-06-07 ENCOUNTER — LAB (OUTPATIENT)
Dept: LAB | Age: 53
End: 2018-06-07
Payer: MEDICARE

## 2018-06-07 VITALS
RESPIRATION RATE: 20 BRPM | DIASTOLIC BLOOD PRESSURE: 62 MMHG | TEMPERATURE: 98.5 F | SYSTOLIC BLOOD PRESSURE: 184 MMHG | HEART RATE: 78 BPM

## 2018-06-07 DIAGNOSIS — Z94.0 TRANSPLANTED KIDNEY: ICD-10-CM

## 2018-06-07 DIAGNOSIS — N18.4 CHRONIC KIDNEY DISEASE, STAGE IV (SEVERE) (HCC): ICD-10-CM

## 2018-06-07 DIAGNOSIS — D63.1 ANEMIA IN STAGE 3 CHRONIC KIDNEY DISEASE (HCC): ICD-10-CM

## 2018-06-07 DIAGNOSIS — D47.2 SMOLDERING MULTIPLE MYELOMA: ICD-10-CM

## 2018-06-07 DIAGNOSIS — N18.30 ANEMIA IN STAGE 3 CHRONIC KIDNEY DISEASE (HCC): ICD-10-CM

## 2018-06-07 DIAGNOSIS — Z94.0 HISTORY OF KIDNEY TRANSPLANT: ICD-10-CM

## 2018-06-07 DIAGNOSIS — N17.9 ACUTE KIDNEY FAILURE (HCC): ICD-10-CM

## 2018-06-07 DIAGNOSIS — N18.9 CHRONIC KIDNEY DISEASE: ICD-10-CM

## 2018-06-07 DIAGNOSIS — D64.9 ANEMIA: ICD-10-CM

## 2018-06-07 LAB
ALBUMIN SERPL BCP-MCNC: 2.8 G/DL (ref 3.5–5)
ALP SERPL-CCNC: 111 U/L (ref 46–116)
ALT SERPL W P-5'-P-CCNC: 22 U/L (ref 12–78)
ANION GAP SERPL CALCULATED.3IONS-SCNC: 8 MMOL/L (ref 4–13)
AST SERPL W P-5'-P-CCNC: 14 U/L (ref 5–45)
BASOPHILS # BLD AUTO: 0.09 THOUSANDS/ΜL (ref 0–0.1)
BASOPHILS NFR BLD AUTO: 1 % (ref 0–1)
BILIRUB SERPL-MCNC: 0.3 MG/DL (ref 0.2–1)
BUN SERPL-MCNC: 28 MG/DL (ref 5–25)
CALCIUM SERPL-MCNC: 9 MG/DL (ref 8.3–10.1)
CHLORIDE SERPL-SCNC: 109 MMOL/L (ref 100–108)
CO2 SERPL-SCNC: 22 MMOL/L (ref 21–32)
CREAT SERPL-MCNC: 2.24 MG/DL (ref 0.6–1.3)
CREAT UR-MCNC: 34.6 MG/DL
EOSINOPHIL # BLD AUTO: 0.28 THOUSAND/ΜL (ref 0–0.61)
EOSINOPHIL NFR BLD AUTO: 3 % (ref 0–6)
ERYTHROCYTE [DISTWIDTH] IN BLOOD BY AUTOMATED COUNT: 20.1 % (ref 11.6–15.1)
GFR SERPL CREATININE-BSD FRML MDRD: 24 ML/MIN/1.73SQ M
GLUCOSE P FAST SERPL-MCNC: 79 MG/DL (ref 65–99)
HCT VFR BLD AUTO: 30.1 % (ref 34.8–46.1)
HGB BLD-MCNC: 8.6 G/DL (ref 11.5–15.4)
IGA SERPL-MCNC: 60 MG/DL (ref 70–400)
IGG SERPL-MCNC: 2840 MG/DL (ref 700–1600)
IGM SERPL-MCNC: 29 MG/DL (ref 40–230)
IMM GRANULOCYTES # BLD AUTO: 0.15 THOUSAND/UL (ref 0–0.2)
IMM GRANULOCYTES NFR BLD AUTO: 2 % (ref 0–2)
LYMPHOCYTES # BLD AUTO: 1.64 THOUSANDS/ΜL (ref 0.6–4.47)
LYMPHOCYTES NFR BLD AUTO: 18 % (ref 14–44)
MAGNESIUM SERPL-MCNC: 2.3 MG/DL (ref 1.6–2.6)
MCH RBC QN AUTO: 24.8 PG (ref 26.8–34.3)
MCHC RBC AUTO-ENTMCNC: 28.6 G/DL (ref 31.4–37.4)
MCV RBC AUTO: 87 FL (ref 82–98)
MONOCYTES # BLD AUTO: 0.45 THOUSAND/ΜL (ref 0.17–1.22)
MONOCYTES NFR BLD AUTO: 5 % (ref 4–12)
NEUTROPHILS # BLD AUTO: 6.6 THOUSANDS/ΜL (ref 1.85–7.62)
NEUTS SEG NFR BLD AUTO: 71 % (ref 43–75)
NRBC BLD AUTO-RTO: 0 /100 WBCS
PHOSPHATE SERPL-MCNC: 3.7 MG/DL (ref 2.7–4.5)
PLATELET # BLD AUTO: 250 THOUSANDS/UL (ref 149–390)
PMV BLD AUTO: 12.3 FL (ref 8.9–12.7)
POTASSIUM SERPL-SCNC: 4.3 MMOL/L (ref 3.5–5.3)
PROT SERPL-MCNC: 8.9 G/DL (ref 6.4–8.2)
PROT UR-MCNC: 49 MG/DL
PROT/CREAT UR: 1.42 MG/G{CREAT} (ref 0–0.1)
RBC # BLD AUTO: 3.47 MILLION/UL (ref 3.81–5.12)
SODIUM SERPL-SCNC: 139 MMOL/L (ref 136–145)
TACROLIMUS BLD-MCNC: 5.6 NG/ML (ref 2–20)
WBC # BLD AUTO: 9.21 THOUSAND/UL (ref 4.31–10.16)

## 2018-06-07 PROCEDURE — 96365 THER/PROPH/DIAG IV INF INIT: CPT

## 2018-06-07 PROCEDURE — 84100 ASSAY OF PHOSPHORUS: CPT

## 2018-06-07 PROCEDURE — 82570 ASSAY OF URINE CREATININE: CPT

## 2018-06-07 PROCEDURE — 83735 ASSAY OF MAGNESIUM: CPT

## 2018-06-07 PROCEDURE — 82784 ASSAY IGA/IGD/IGG/IGM EACH: CPT

## 2018-06-07 PROCEDURE — 83883 ASSAY NEPHELOMETRY NOT SPEC: CPT

## 2018-06-07 PROCEDURE — 80053 COMPREHEN METABOLIC PANEL: CPT

## 2018-06-07 PROCEDURE — 84165 PROTEIN E-PHORESIS SERUM: CPT

## 2018-06-07 PROCEDURE — 84156 ASSAY OF PROTEIN URINE: CPT

## 2018-06-07 PROCEDURE — 80197 ASSAY OF TACROLIMUS: CPT

## 2018-06-07 PROCEDURE — 96366 THER/PROPH/DIAG IV INF ADDON: CPT

## 2018-06-07 PROCEDURE — 36415 COLL VENOUS BLD VENIPUNCTURE: CPT

## 2018-06-07 PROCEDURE — 84165 PROTEIN E-PHORESIS SERUM: CPT | Performed by: PATHOLOGY

## 2018-06-07 PROCEDURE — 85025 COMPLETE CBC W/AUTO DIFF WBC: CPT

## 2018-06-07 RX ADMIN — IRON SUCROSE 200 MG: 20 INJECTION, SOLUTION INTRAVENOUS at 13:20

## 2018-06-07 NOTE — PLAN OF CARE
Problem: Potential for Falls  Goal: Patient will remain free of falls  INTERVENTIONS:  - Assess patient frequently for physical needs  -  Identify cognitive and physical deficits and behaviors that affect risk of falls    -  [place] fall precautions as indicated by assessment   - Educate patient/family on patient safety including physical limitations  - Instruct patient to call for assistance with activity based on assessment  - Modify environment to reduce risk of injury  - Consider OT/PT consult to assist with strengthening/mobility   Outcome: Progressing

## 2018-06-07 NOTE — PROGRESS NOTES
Hello    Patient normally is followed up by Ms Kaitlin Ruiz  Can the patient be notified of following and have following completed    1) Cr slightly higher than prior - is patient feeling well (no fevers, chills, nausea, vomiting)?; is the patient drinking approximately 2 L fluids daily? 2) can patient hold furosemide for 3 days, and then can restart  3) can patient have BMP in 1 week  4) can patient be reminded to complete renal art u/s  5) how have the patient's BP been at home last 1-2 weeks, jony since increasing of doxazosin?     Thank you    np

## 2018-06-08 DIAGNOSIS — Z94.0 TRANSPLANTED KIDNEY: Primary | ICD-10-CM

## 2018-06-08 LAB
KAPPA LC FREE SER-MCNC: 65.2 MG/L (ref 3.3–19.4)
KAPPA LC FREE/LAMBDA FREE SER: 3.31 {RATIO} (ref 0.26–1.65)
LAMBDA LC FREE SERPL-MCNC: 19.7 MG/L (ref 5.7–26.3)

## 2018-06-08 NOTE — PROGRESS NOTES
1) Cr slightly higher than prior - is patient feeling well (no fevers, chills, nausea, vomiting)?; is the patient drinking approximately 2 L fluids daily? 2) can patient hold furosemide for 3 days, and then can restart   3) can patient have BMP in 1 week   4) can patient be reminded to complete renal art u/s   5) how have the patient's BP been at home last 1-2 weeks, jony since increasing of doxazosin? Left message for pt regarding the above,asked her to call office to let us know that she got message ans understands instructions

## 2018-06-09 LAB
ALBUMIN SERPL ELPH-MCNC: 3.32 G/DL (ref 3.5–5)
ALBUMIN SERPL ELPH-MCNC: 37.7 % (ref 52–65)
ALPHA1 GLOB SERPL ELPH-MCNC: 0.4 G/DL (ref 0.1–0.4)
ALPHA1 GLOB SERPL ELPH-MCNC: 4.6 % (ref 2.5–5)
ALPHA2 GLOB SERPL ELPH-MCNC: 0.94 G/DL (ref 0.4–1.2)
ALPHA2 GLOB SERPL ELPH-MCNC: 10.7 % (ref 7–13)
BETA GLOB ABNORMAL SERPL ELPH-MCNC: 0.44 G/DL (ref 0.4–0.8)
BETA1 GLOB SERPL ELPH-MCNC: 5 % (ref 5–13)
BETA2 GLOB SERPL ELPH-MCNC: 6.5 % (ref 2–8)
BETA2+GAMMA GLOB SERPL ELPH-MCNC: 0.57 G/DL (ref 0.2–0.5)
GAMMA GLOB ABNORMAL SERPL ELPH-MCNC: 3.12 G/DL (ref 0.5–1.6)
GAMMA GLOB SERPL ELPH-MCNC: 35.5 % (ref 12–22)
IGG/ALB SER: 0.61 {RATIO} (ref 1.1–1.8)
M PEAK ID 2: 25.2 %
M PROTEIN 1 MFR SERPL ELPH: 9.7 %
M PROTEIN 1 SERPL ELPH-MCNC: 0.85 G/DL
M PROTEIN 2 SERPL ELPH-MCNC: 2.22 G/DL
PROT SERPL-MCNC: 8.8 G/DL (ref 6.4–8.2)

## 2018-06-15 ENCOUNTER — HOSPITAL ENCOUNTER (OUTPATIENT)
Dept: INFUSION CENTER | Facility: HOSPITAL | Age: 53
Discharge: HOME | End: 2018-06-15

## 2018-06-15 ENCOUNTER — TELEPHONE (OUTPATIENT)
Dept: ENDOCRINOLOGY | Facility: CLINIC | Age: 53
End: 2018-06-15

## 2018-06-15 DIAGNOSIS — C90.00 MULTIPLE MYELOMA NOT HAVING ACHIEVED REMISSION (HCC): Primary | ICD-10-CM

## 2018-06-18 ENCOUNTER — LAB (OUTPATIENT)
Dept: LAB | Age: 53
End: 2018-06-18
Payer: MEDICARE

## 2018-06-18 DIAGNOSIS — Z94.0 TRANSPLANTED KIDNEY: ICD-10-CM

## 2018-06-18 DIAGNOSIS — E10.8 TYPE 1 DIABETES MELLITUS WITH COMPLICATION (HCC): ICD-10-CM

## 2018-06-18 DIAGNOSIS — E03.9 ACQUIRED HYPOTHYROIDISM: ICD-10-CM

## 2018-06-18 DIAGNOSIS — C90.00 MULTIPLE MYELOMA NOT HAVING ACHIEVED REMISSION (HCC): ICD-10-CM

## 2018-06-18 LAB
ALBUMIN SERPL BCP-MCNC: 2.7 G/DL (ref 3.5–5)
ALP SERPL-CCNC: 112 U/L (ref 46–116)
ALT SERPL W P-5'-P-CCNC: 22 U/L (ref 12–78)
ANION GAP SERPL CALCULATED.3IONS-SCNC: 9 MMOL/L (ref 4–13)
AST SERPL W P-5'-P-CCNC: 16 U/L (ref 5–45)
BASOPHILS # BLD AUTO: 0.08 THOUSANDS/ΜL (ref 0–0.1)
BASOPHILS NFR BLD AUTO: 1 % (ref 0–1)
BILIRUB SERPL-MCNC: 0.3 MG/DL (ref 0.2–1)
BUN SERPL-MCNC: 25 MG/DL (ref 5–25)
CALCIUM SERPL-MCNC: 8.9 MG/DL (ref 8.3–10.1)
CHLORIDE SERPL-SCNC: 108 MMOL/L (ref 100–108)
CO2 SERPL-SCNC: 22 MMOL/L (ref 21–32)
CREAT SERPL-MCNC: 1.79 MG/DL (ref 0.6–1.3)
EOSINOPHIL # BLD AUTO: 0.29 THOUSAND/ΜL (ref 0–0.61)
EOSINOPHIL NFR BLD AUTO: 3 % (ref 0–6)
ERYTHROCYTE [DISTWIDTH] IN BLOOD BY AUTOMATED COUNT: 21.3 % (ref 11.6–15.1)
EST. AVERAGE GLUCOSE BLD GHB EST-MCNC: 91 MG/DL
GFR SERPL CREATININE-BSD FRML MDRD: 32 ML/MIN/1.73SQ M
GLUCOSE P FAST SERPL-MCNC: 101 MG/DL (ref 65–99)
HBA1C MFR BLD: 4.8 % (ref 4.2–6.3)
HCT VFR BLD AUTO: 30.7 % (ref 34.8–46.1)
HGB BLD-MCNC: 8.7 G/DL (ref 11.5–15.4)
IGA SERPL-MCNC: 59 MG/DL (ref 70–400)
IGG SERPL-MCNC: 2670 MG/DL (ref 700–1600)
IGM SERPL-MCNC: 30 MG/DL (ref 40–230)
IMM GRANULOCYTES # BLD AUTO: 0.08 THOUSAND/UL (ref 0–0.2)
IMM GRANULOCYTES NFR BLD AUTO: 1 % (ref 0–2)
LYMPHOCYTES # BLD AUTO: 1.35 THOUSANDS/ΜL (ref 0.6–4.47)
LYMPHOCYTES NFR BLD AUTO: 15 % (ref 14–44)
MCH RBC QN AUTO: 24.8 PG (ref 26.8–34.3)
MCHC RBC AUTO-ENTMCNC: 28.3 G/DL (ref 31.4–37.4)
MCV RBC AUTO: 88 FL (ref 82–98)
MONOCYTES # BLD AUTO: 0.47 THOUSAND/ΜL (ref 0.17–1.22)
MONOCYTES NFR BLD AUTO: 5 % (ref 4–12)
NEUTROPHILS # BLD AUTO: 6.94 THOUSANDS/ΜL (ref 1.85–7.62)
NEUTS SEG NFR BLD AUTO: 75 % (ref 43–75)
NRBC BLD AUTO-RTO: 0 /100 WBCS
PLATELET # BLD AUTO: 253 THOUSANDS/UL (ref 149–390)
PMV BLD AUTO: 12.4 FL (ref 8.9–12.7)
POTASSIUM SERPL-SCNC: 4.2 MMOL/L (ref 3.5–5.3)
PROT SERPL-MCNC: 9 G/DL (ref 6.4–8.2)
RBC # BLD AUTO: 3.51 MILLION/UL (ref 3.81–5.12)
SODIUM SERPL-SCNC: 139 MMOL/L (ref 136–145)
T4 FREE SERPL-MCNC: 0.94 NG/DL (ref 0.76–1.46)
TSH SERPL DL<=0.05 MIU/L-ACNC: 5.02 UIU/ML (ref 0.36–3.74)
WBC # BLD AUTO: 9.21 THOUSAND/UL (ref 4.31–10.16)

## 2018-06-18 PROCEDURE — 83883 ASSAY NEPHELOMETRY NOT SPEC: CPT

## 2018-06-18 PROCEDURE — 84439 ASSAY OF FREE THYROXINE: CPT

## 2018-06-18 PROCEDURE — 83036 HEMOGLOBIN GLYCOSYLATED A1C: CPT

## 2018-06-18 PROCEDURE — 36415 COLL VENOUS BLD VENIPUNCTURE: CPT

## 2018-06-18 PROCEDURE — 84165 PROTEIN E-PHORESIS SERUM: CPT

## 2018-06-18 PROCEDURE — 84443 ASSAY THYROID STIM HORMONE: CPT

## 2018-06-18 PROCEDURE — 82784 ASSAY IGA/IGD/IGG/IGM EACH: CPT

## 2018-06-18 PROCEDURE — 80053 COMPREHEN METABOLIC PANEL: CPT

## 2018-06-18 PROCEDURE — 85025 COMPLETE CBC W/AUTO DIFF WBC: CPT

## 2018-06-18 PROCEDURE — 84165 PROTEIN E-PHORESIS SERUM: CPT | Performed by: PATHOLOGY

## 2018-06-19 ENCOUNTER — HOSPITAL ENCOUNTER (OUTPATIENT)
Dept: INFUSION CENTER | Facility: HOSPITAL | Age: 53
Discharge: HOME | End: 2018-06-19
Payer: MEDICARE

## 2018-06-19 VITALS
HEART RATE: 68 BPM | DIASTOLIC BLOOD PRESSURE: 58 MMHG | TEMPERATURE: 97.4 F | RESPIRATION RATE: 20 BRPM | SYSTOLIC BLOOD PRESSURE: 160 MMHG

## 2018-06-19 LAB
KAPPA LC FREE SER-MCNC: 61.3 MG/L (ref 3.3–19.4)
KAPPA LC FREE/LAMBDA FREE SER: 3.41 {RATIO} (ref 0.26–1.65)
LAMBDA LC FREE SERPL-MCNC: 18 MG/L (ref 5.7–26.3)

## 2018-06-19 PROCEDURE — 96365 THER/PROPH/DIAG IV INF INIT: CPT

## 2018-06-19 PROCEDURE — 96366 THER/PROPH/DIAG IV INF ADDON: CPT

## 2018-06-19 RX ADMIN — IRON SUCROSE 200 MG: 20 INJECTION, SOLUTION INTRAVENOUS at 11:22

## 2018-06-19 NOTE — PROGRESS NOTES
Pt to clinic for venofer infusion, pt offers no complaints at this time, will continue to monitor, aware of next appointment, declines avs

## 2018-06-20 ENCOUNTER — OFFICE VISIT (OUTPATIENT)
Dept: HEMATOLOGY ONCOLOGY | Facility: CLINIC | Age: 53
End: 2018-06-20
Payer: MEDICARE

## 2018-06-20 VITALS
DIASTOLIC BLOOD PRESSURE: 60 MMHG | RESPIRATION RATE: 14 BRPM | BODY MASS INDEX: 35.63 KG/M2 | SYSTOLIC BLOOD PRESSURE: 148 MMHG | OXYGEN SATURATION: 98 % | TEMPERATURE: 97 F | HEART RATE: 88 BPM | HEIGHT: 67 IN | WEIGHT: 227 LBS

## 2018-06-20 DIAGNOSIS — C90.00 INDOLENT MULTIPLE MYELOMA (HCC): Primary | ICD-10-CM

## 2018-06-20 DIAGNOSIS — I35.1 NONRHEUMATIC AORTIC (VALVE) INSUFFICIENCY: Primary | ICD-10-CM

## 2018-06-20 DIAGNOSIS — F33.1 MAJOR DEPRESSIVE DISORDER, RECURRENT EPISODE, MODERATE (HCC): Chronic | ICD-10-CM

## 2018-06-20 DIAGNOSIS — F41.1 GAD (GENERALIZED ANXIETY DISORDER): ICD-10-CM

## 2018-06-20 PROCEDURE — 99214 OFFICE O/P EST MOD 30 MIN: CPT | Performed by: INTERNAL MEDICINE

## 2018-06-20 NOTE — PROGRESS NOTES
Hematology Outpatient Follow - Up Note  Mary Found 46 y o  female MRN: @ Encounter: [id number]        Date:  6/20/2018        Assessment/ Plan:  1  Smoldering multiple myeloma, bone marrow biopsy on September 2017 showed 15-20% plasma cells, fish panel for myeloma was negative as well as normal cytogenetics  however M protein is increasing up despite improvement in creatinine down from 2 24 - 1 79, she had iron deficiency anemia, she is receiving IV iron for hemoglobin of 8 7, MCV of 88, RDW of 21  She was evaluated for 2nd opinion and the decision to continue watchful observation however I believe the patient is needing treatment for multiple myeloma in the near future in about 3-4 months with Ixazomib or carfilzomib based regimen, I will shy away from bortezomib because of neuropathy and I will will shy away from Revlimid because of renal failure  2  Status post kidney and pancreas transplant at [address] secondary to diabetic nephropathy and diabetic neuropathy  3   Iron deficiency anemia she is on iron infusion, follow-up in 3 months with CBC, CMP, SPEP, free light chain              HPI: 58-year-old  female with diabetes mellitus type 1, diabetic neuropathy, diabetic nephropathy, status post kidney and pancreatic transplant in 1998 at [address], amputation of the right big toe, cholecystectomy, vitamin-D deficiency, with exacerbation diabetes mellitus, and possible pancreatic burn out, workup was found to have abnormal serum protein electrophoresis in August 2017 with IgG kappa, the 1st peak of 0 54 grams/deciliter and the 2nd peak of 2 27 grams/deciliter, mildly elevated kappa light chain, chronic kidney disease, a bone marrow biopsy showed 15-20% plasma cells, normal cytogenetics and normal FISH panel for myeloma, skeletal survey showed no evidence of lytic lesion, she was diagnosed with smoldering multiple myeloma   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, she was evaluated by Dr Judit Glez at [facility] the decision to continue watchful observation    currently on insulin because of the pancreatic failure     Interval History: received IV iron with optimal hydration, creatinine came down to 1 79 from 2 24        Previous Treatment:         Test Results:    Imaging: No results found  Labs:   Lab Results   Component Value Date    WBC 9 21 06/18/2018    HGB 8 7 (L) 06/18/2018    HCT 30 7 (L) 06/18/2018    MCV 88 06/18/2018     06/18/2018     Lab Results   Component Value Date     06/18/2018    K 4 2 06/18/2018     06/18/2018    CO2 22 06/18/2018    ANIONGAP 9 06/18/2018    BUN 25 06/18/2018    CREATININE 1 79 (H) 06/18/2018    GLUCOSE 88 02/03/2018    GLUF 101 (H) 06/18/2018    CALCIUM 8 9 06/18/2018    AST 16 06/18/2018    ALT 22 06/18/2018    ALKPHOS 112 06/18/2018    PROT 9 0 (H) 06/18/2018    BILITOT 0 30 06/18/2018    EGFR 32 06/18/2018       Lab Results   Component Value Date    IRON 40 (L) 02/05/2018    TIBC 293 02/05/2018    FERRITIN 19 02/05/2018      IgA 59, IgG 2670, IgM 30, kappa light chain 61, lambda light chain 18 with a ratio of 3 4, TSH 5 0, serum protein electrophoresis showed M protein of 3 07, IgG kappa subtype, total protein 8 8        ROS:   Review of Systems   Constitutional: Negative for activity change, appetite change, diaphoresis, fatigue, fever and unexpected weight change  HENT: Negative for facial swelling, hearing loss, rhinorrhea, sinus pain, sinus pressure, sneezing, sore throat and tinnitus  Eyes: Negative for photophobia, pain, discharge, redness, itching and visual disturbance  Respiratory: Negative for apnea, chest tightness and wheezing  Cardiovascular: Negative for chest pain, palpitations and leg swelling  Gastrointestinal: Negative for abdominal distention, abdominal pain, blood in stool, constipation, diarrhea, nausea, rectal pain and vomiting     Endocrine: Negative for cold intolerance, heat intolerance, polydipsia and polyphagia  Genitourinary: Negative for difficulty urinating, dyspareunia, frequency, hematuria, pelvic pain, urgency, vaginal bleeding and vaginal discharge  Musculoskeletal: Negative for arthralgias, back pain, gait problem, joint swelling and myalgias  Skin: Negative for color change, pallor and rash  Allergic/Immunologic: Negative for environmental allergies and food allergies  Neurological: Positive for tremors  Negative for dizziness, seizures, syncope, speech difficulty, numbness and headaches  Hematological: Negative for adenopathy  Does not bruise/bleed easily  Psychiatric/Behavioral: Negative for agitation, confusion, dysphoric mood, hallucinations and suicidal ideas  Current Medications: Reviewed  Allergies: Reviewed  PMH/FH/SH:  Reviewed      Physical Exam:    Body surface area is 2 12 meters squared  Wt Readings from Last 3 Encounters:   06/20/18 103 kg (227 lb)   05/21/18 103 kg (226 lb)   05/01/18 104 kg (229 lb)        Temp Readings from Last 3 Encounters:   06/20/18 (!) 97 °F (36 1 °C)   06/19/18 (!) 97 4 °F (36 3 °C) (Temporal)   06/07/18 98 5 °F (36 9 °C)        BP Readings from Last 3 Encounters:   06/20/18 148/60   06/19/18 160/58   06/07/18 (!) 184/62         Pulse Readings from Last 3 Encounters:   06/20/18 88   06/19/18 68   06/07/18 78        Physical Exam   Constitutional: She is oriented to person, place, and time  She appears well-developed and well-nourished  No distress  HENT:   Head: Normocephalic and atraumatic  Mouth/Throat: Oropharynx is clear and moist  No oropharyngeal exudate  Eyes: Conjunctivae and EOM are normal  Pupils are equal, round, and reactive to light  No scleral icterus  Neck: Normal range of motion  Neck supple  No JVD present  No tracheal deviation present  No thyromegaly present  Cardiovascular: Normal rate and regular rhythm    Exam reveals no gallop and no friction rub     No murmur heard  Pulmonary/Chest: Effort normal and breath sounds normal  No respiratory distress  She has no wheezes  She has no rales  She exhibits no tenderness  Abdominal: Soft  Bowel sounds are normal  She exhibits no distension and no mass  There is no tenderness  There is no rebound and no guarding  Musculoskeletal: Normal range of motion  She exhibits no edema  Lymphadenopathy:     She has no cervical adenopathy  Neurological: She is alert and oriented to person, place, and time  tremor   Skin: Skin is warm and dry  No rash noted  She is not diaphoretic  No erythema  No pallor  Psychiatric: She has a normal mood and affect  Her behavior is normal  Judgment and thought content normal    Vitals reviewed  Goals and Barriers:  Current Goal: Minimize effects of disease  Barriers: None  Patient's Capacity to Self Care:  Patient is able to self care      Code Status: @[facility]@

## 2018-06-20 NOTE — LETTER
June 20, 2018     [place], [address]    Patient: Chanelle Galdamez   YOB: 1965   Date of Visit: 6/20/2018       Dear Dr Reza Edouard:    Thank you for referring Chanelle Galdamez to me for evaluation  Below are my notes for this consultation  If you have questions, please do not hesitate to call me  I look forward to following your patient along with you  Sincerely,        Jessica Ivey MD        CC: MD Nayana Araiza MD Ardelle Pattee, MD  6/20/2018  9:17 AM  Sign at close encounter  Hematology Outpatient Follow - Up Note  Chanelle Galdamez 46 y o  female MRN: @ Encounter: [id number]        Date:  6/20/2018        Assessment/ Plan:  1  Smoldering multiple myeloma, bone marrow biopsy on September 2017 showed 15-20% plasma cells, fish panel for myeloma was negative as well as normal cytogenetics  however M protein is increasing up despite improvement in creatinine down from 2 24 - 1 79, she had iron deficiency anemia, she is receiving IV iron for hemoglobin of 8 7, MCV of 88, RDW of 21  She was evaluated for 2nd opinion and the decision to continue watchful observation however I believe the patient is needing treatment for multiple myeloma in the near future in about 3-4 months with Ixazomib or carfilzomib based regimen, I will shy away from bortezomib because of neuropathy and I will will shy away from Revlimid because of renal failure  2  Status post kidney and pancreas transplant at [address] secondary to diabetic nephropathy and diabetic neuropathy  3   Iron deficiency anemia she is on iron infusion, follow-up in 3 months with CBC, CMP, SPEP, free light chain              HPI: 19-year-old  female with diabetes mellitus type 1, diabetic neuropathy, diabetic nephropathy, status post kidney and pancreatic transplant in 1998 at [address], amputation of the right big toe, cholecystectomy, vitamin-D deficiency, with exacerbation diabetes mellitus, and possible pancreatic burn out, workup was found to have abnormal serum protein electrophoresis in August 2017 with IgG kappa, the 1st peak of 0 54 grams/deciliter and the 2nd peak of 2 27 grams/deciliter, mildly elevated kappa light chain, chronic kidney disease, a bone marrow biopsy showed 15-20% plasma cells, normal cytogenetics and normal FISH panel for myeloma, skeletal survey showed no evidence of lytic lesion, she was diagnosed with smoldering multiple myeloma   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, she was evaluated by Dr Salazar Pompa at [facility] the decision to continue watchful observation    currently on insulin because of the pancreatic failure     Interval History: received IV iron with optimal hydration, creatinine came down to 1 79 from 2 24        Previous Treatment:         Test Results:    Imaging: No results found      Labs:   Lab Results   Component Value Date    WBC 9 21 06/18/2018    HGB 8 7 (L) 06/18/2018    HCT 30 7 (L) 06/18/2018    MCV 88 06/18/2018     06/18/2018     Lab Results   Component Value Date     06/18/2018    K 4 2 06/18/2018     06/18/2018    CO2 22 06/18/2018    ANIONGAP 9 06/18/2018    BUN 25 06/18/2018    CREATININE 1 79 (H) 06/18/2018    GLUCOSE 88 02/03/2018    GLUF 101 (H) 06/18/2018    CALCIUM 8 9 06/18/2018    AST 16 06/18/2018    ALT 22 06/18/2018    ALKPHOS 112 06/18/2018    PROT 9 0 (H) 06/18/2018    BILITOT 0 30 06/18/2018    EGFR 32 06/18/2018       Lab Results   Component Value Date    IRON 40 (L) 02/05/2018    TIBC 293 02/05/2018    FERRITIN 19 02/05/2018      IgA 59, IgG 2670, IgM 30, kappa light chain 61, lambda light chain 18 with a ratio of 3 4, TSH 5 0, serum protein electrophoresis showed M protein of 3 07, IgG kappa subtype, total protein 8 8        ROS:   Review of Systems   Constitutional: Negative for activity change, appetite change, diaphoresis, fatigue, fever and unexpected weight change  HENT: Negative for facial swelling, hearing loss, rhinorrhea, sinus pain, sinus pressure, sneezing, sore throat and tinnitus  Eyes: Negative for photophobia, pain, discharge, redness, itching and visual disturbance  Respiratory: Negative for apnea, chest tightness and wheezing  Cardiovascular: Negative for chest pain, palpitations and leg swelling  Gastrointestinal: Negative for abdominal distention, abdominal pain, blood in stool, constipation, diarrhea, nausea, rectal pain and vomiting  Endocrine: Negative for cold intolerance, heat intolerance, polydipsia and polyphagia  Genitourinary: Negative for difficulty urinating, dyspareunia, frequency, hematuria, pelvic pain, urgency, vaginal bleeding and vaginal discharge  Musculoskeletal: Negative for arthralgias, back pain, gait problem, joint swelling and myalgias  Skin: Negative for color change, pallor and rash  Allergic/Immunologic: Negative for environmental allergies and food allergies  Neurological: Positive for tremors  Negative for dizziness, seizures, syncope, speech difficulty, numbness and headaches  Hematological: Negative for adenopathy  Does not bruise/bleed easily  Psychiatric/Behavioral: Negative for agitation, confusion, dysphoric mood, hallucinations and suicidal ideas  Current Medications: Reviewed  Allergies: Reviewed  PMH/FH/SH:  Reviewed      Physical Exam:    Body surface area is 2 12 meters squared      Wt Readings from Last 3 Encounters:   06/20/18 103 kg (227 lb)   05/21/18 103 kg (226 lb)   05/01/18 104 kg (229 lb)        Temp Readings from Last 3 Encounters:   06/20/18 (!) 97 °F (36 1 °C)   06/19/18 (!) 97 4 °F (36 3 °C) (Temporal)   06/07/18 98 5 °F (36 9 °C)        BP Readings from Last 3 Encounters:   06/20/18 148/60   06/19/18 160/58   06/07/18 (!) 184/62         Pulse Readings from Last 3 Encounters:   06/20/18 88   06/19/18 68   06/07/18 78        Physical Exam   Constitutional: She is oriented to person, place, and time  She appears well-developed and well-nourished  No distress  HENT:   Head: Normocephalic and atraumatic  Mouth/Throat: Oropharynx is clear and moist  No oropharyngeal exudate  Eyes: Conjunctivae and EOM are normal  Pupils are equal, round, and reactive to light  No scleral icterus  Neck: Normal range of motion  Neck supple  No JVD present  No tracheal deviation present  No thyromegaly present  Cardiovascular: Normal rate and regular rhythm  Exam reveals no gallop and no friction rub  No murmur heard  Pulmonary/Chest: Effort normal and breath sounds normal  No respiratory distress  She has no wheezes  She has no rales  She exhibits no tenderness  Abdominal: Soft  Bowel sounds are normal  She exhibits no distension and no mass  There is no tenderness  There is no rebound and no guarding  Musculoskeletal: Normal range of motion  She exhibits no edema  Lymphadenopathy:     She has no cervical adenopathy  Neurological: She is alert and oriented to person, place, and time  tremor   Skin: Skin is warm and dry  No rash noted  She is not diaphoretic  No erythema  No pallor  Psychiatric: She has a normal mood and affect  Her behavior is normal  Judgment and thought content normal    Vitals reviewed  Goals and Barriers:  Current Goal: Minimize effects of disease  Barriers: None  Patient's Capacity to Self Care:  Patient is able to self care      Code Status: [unfilled]

## 2018-06-20 NOTE — PROGRESS NOTES
Vivian Curry called to see if she needs any testing prior to o/v 7/31  Per last note, echo  Echo ordered and transferred her to central scheduling to schedule

## 2018-06-21 ENCOUNTER — TELEPHONE (OUTPATIENT)
Dept: OTHER | Facility: HOSPITAL | Age: 53
End: 2018-06-21

## 2018-06-21 ENCOUNTER — DOCUMENTATION (OUTPATIENT)
Dept: NEPHROLOGY | Facility: CLINIC | Age: 53
End: 2018-06-21

## 2018-06-21 LAB
ALBUMIN SERPL ELPH-MCNC: 3.29 G/DL (ref 3.5–5)
ALBUMIN SERPL ELPH-MCNC: 38.2 % (ref 52–65)
ALPHA1 GLOB SERPL ELPH-MCNC: 0.37 G/DL (ref 0.1–0.4)
ALPHA1 GLOB SERPL ELPH-MCNC: 4.3 % (ref 2.5–5)
ALPHA2 GLOB SERPL ELPH-MCNC: 0.92 G/DL (ref 0.4–1.2)
ALPHA2 GLOB SERPL ELPH-MCNC: 10.7 % (ref 7–13)
BETA GLOB ABNORMAL SERPL ELPH-MCNC: 0.4 G/DL (ref 0.4–0.8)
BETA1 GLOB SERPL ELPH-MCNC: 4.7 % (ref 5–13)
BETA2 GLOB SERPL ELPH-MCNC: 8.6 % (ref 2–8)
BETA2+GAMMA GLOB SERPL ELPH-MCNC: 0.74 G/DL (ref 0.2–0.5)
GAMMA GLOB ABNORMAL SERPL ELPH-MCNC: 2.88 G/DL (ref 0.5–1.6)
GAMMA GLOB SERPL ELPH-MCNC: 33.5 % (ref 12–22)
IGG/ALB SER: 0.62 {RATIO} (ref 1.1–1.8)
M PEAK ID 2: 30.7 %
M PROTEIN 1 MFR SERPL ELPH: 5.4 %
M PROTEIN 1 SERPL ELPH-MCNC: 0.46 G/DL
M PROTEIN 2 SERPL ELPH-MCNC: 2.64 G/DL
PROT SERPL-MCNC: 8.6 G/DL (ref 6.4–8.2)

## 2018-06-21 RX ORDER — DULOXETIN HYDROCHLORIDE 60 MG/1
CAPSULE, DELAYED RELEASE ORAL
Qty: 180 CAPSULE | Refills: 0 | Status: SHIPPED | OUTPATIENT
Start: 2018-06-21 | End: 2018-09-17 | Stop reason: SDUPTHER

## 2018-06-21 RX ORDER — CLINDAMYCIN HYDROCHLORIDE 300 MG/1
300 CAPSULE ORAL 2 TIMES DAILY
COMMUNITY
End: 2018-07-20 | Stop reason: SDUPTHER

## 2018-06-21 NOTE — TELEPHONE ENCOUNTER
Patient reports that she will be running out of her Duloxetine in about three days and is requesting the refill  For Bradford Chiu's review in Dr Dolly Trejo absence

## 2018-06-21 NOTE — TELEPHONE ENCOUNTER
Reviewed with Hematology  Will monitor for now and potential biopsy in 2-3 months of bone marrow  From renal standpoint, if Cr rising due to paraproteinemia, may need to consider lowering of IS while treatment of myeloma is started  (this is only tentative plan in this scenario)  For now, are monitoring with current regimen

## 2018-06-21 NOTE — TELEPHONE ENCOUNTER
Covering for Dr Catrina Lee whose most recent note (1/25/2018)-- I reviewed  Some Rxs do not seem to come up as recent on the Rx log so I contacted [facility] on cell/home phone # of record and she states that she has NOT had any lag in taking any of her psychiatric medications--she confirms she is taking: (Sertraline, Duloxetine, Aripiprazole, Trazodone, Buspirone, and Lorazepam)  She states she "Checked today" and is sure she does not need any Rxs other than the Duloxetine renewed at this time    I reviewed that her next appt date and time with Dr Catrina Lee is 10/15/2018 at 2:30PM    I e-scribed the following to her [address]:  Duloxetine 60mg (1) cap po bid # 180

## 2018-06-25 DIAGNOSIS — E03.9 ACQUIRED HYPOTHYROIDISM: ICD-10-CM

## 2018-06-25 RX ORDER — LEVOTHYROXINE SODIUM 112 UG/1
TABLET ORAL
Qty: 90 TABLET | Refills: 1 | Status: SHIPPED | OUTPATIENT
Start: 2018-06-25 | End: 2018-06-25 | Stop reason: SDUPTHER

## 2018-06-25 NOTE — TELEPHONE ENCOUNTER
----- Message from Nayana Louis MD sent at 6/25/2018 12:17 PM EDT -----  Please call the patient regarding her abnormal result    Please inform to increase levothyroxine by half tablet per week ( on sundays), she will need repeat thyroid blood work before her appointment sept

## 2018-06-26 ENCOUNTER — HOSPITAL ENCOUNTER (OUTPATIENT)
Dept: NON INVASIVE DIAGNOSTICS | Facility: CLINIC | Age: 53
Discharge: HOME | End: 2018-06-26
Payer: MEDICARE

## 2018-06-26 DIAGNOSIS — F41.1 GAD (GENERALIZED ANXIETY DISORDER): Primary | Chronic | ICD-10-CM

## 2018-06-26 DIAGNOSIS — F41.9 ANXIETY: ICD-10-CM

## 2018-06-26 DIAGNOSIS — I70.1 RENAL ARTERY STENOSIS (HCC): ICD-10-CM

## 2018-06-26 DIAGNOSIS — N18.4 CKD (CHRONIC KIDNEY DISEASE), STAGE IV (HCC): ICD-10-CM

## 2018-06-26 PROCEDURE — 93975 VASCULAR STUDY: CPT

## 2018-06-26 RX ORDER — LORAZEPAM 2 MG/1
2 TABLET ORAL 3 TIMES DAILY PRN
Qty: 270 TABLET | Refills: 1 | Status: SHIPPED | OUTPATIENT
Start: 2018-06-26 | End: 2018-12-27 | Stop reason: SDUPTHER

## 2018-06-26 RX ORDER — LEVOTHYROXINE SODIUM 112 UG/1
TABLET ORAL
Qty: 225 TABLET | Refills: 1 | Status: SHIPPED | OUTPATIENT
Start: 2018-06-26 | End: 2018-07-06 | Stop reason: SDUPTHER

## 2018-06-26 RX ORDER — DOXAZOSIN MESYLATE 1 MG/1
2 TABLET ORAL
Qty: 180 TABLET | Refills: 0 | Status: SHIPPED | OUTPATIENT
Start: 2018-06-26 | End: 2018-10-12 | Stop reason: SDUPTHER

## 2018-06-27 ENCOUNTER — HOSPITAL ENCOUNTER (OUTPATIENT)
Dept: INFUSION CENTER | Facility: HOSPITAL | Age: 53
Discharge: HOME | End: 2018-06-27
Payer: MEDICARE

## 2018-06-27 VITALS
TEMPERATURE: 97.8 F | SYSTOLIC BLOOD PRESSURE: 140 MMHG | HEART RATE: 78 BPM | DIASTOLIC BLOOD PRESSURE: 78 MMHG | RESPIRATION RATE: 18 BRPM

## 2018-06-27 PROCEDURE — 96366 THER/PROPH/DIAG IV INF ADDON: CPT

## 2018-06-27 PROCEDURE — 93976 VASCULAR STUDY: CPT | Performed by: SURGERY

## 2018-06-27 PROCEDURE — 96365 THER/PROPH/DIAG IV INF INIT: CPT

## 2018-06-27 RX ADMIN — IRON SUCROSE 200 MG: 20 INJECTION, SOLUTION INTRAVENOUS at 11:42

## 2018-06-27 NOTE — PLAN OF CARE
Problem: Potential for Falls  Goal: Patient will remain free of falls  INTERVENTIONS:  - Assess patient frequently for physical needs  -  Identify cognitive and physical deficits and behaviors that affect risk of falls    -  [place] fall precautions as indicated by assessment   - Educate patient/family on patient safety including physical limitations  - Instruct patient to call for assistance with activity based on assessment  - Modify environment to reduce risk of injury  - Consider OT/PT consult to assist with strengthening/mobility   Outcome: Progressing

## 2018-06-28 ENCOUNTER — HOSPITAL ENCOUNTER (OUTPATIENT)
Dept: RADIOLOGY | Age: 53
Discharge: HOME | End: 2018-06-28
Payer: MEDICARE

## 2018-06-28 DIAGNOSIS — Z12.39 SCREENING BREAST EXAMINATION: ICD-10-CM

## 2018-06-28 PROCEDURE — 77067 SCR MAMMO BI INCL CAD: CPT

## 2018-06-28 PROCEDURE — 77063 BREAST TOMOSYNTHESIS BI: CPT

## 2018-06-29 ENCOUNTER — HOSPITAL ENCOUNTER (OUTPATIENT)
Dept: NON INVASIVE DIAGNOSTICS | Facility: CLINIC | Age: 53
Discharge: HOME | End: 2018-06-29
Payer: MEDICARE

## 2018-06-29 ENCOUNTER — TELEPHONE (OUTPATIENT)
Dept: ENDOCRINOLOGY | Facility: CLINIC | Age: 53
End: 2018-06-29

## 2018-06-29 DIAGNOSIS — E11.65 UNCONTROLLED TYPE 2 DIABETES MELLITUS WITH COMPLICATION, UNSPECIFIED WHETHER LONG TERM INSULIN USE: ICD-10-CM

## 2018-06-29 DIAGNOSIS — E11.8 UNCONTROLLED TYPE 2 DIABETES MELLITUS WITH COMPLICATION, UNSPECIFIED WHETHER LONG TERM INSULIN USE: ICD-10-CM

## 2018-06-29 DIAGNOSIS — R09.89 BRUIT OF LEFT CAROTID ARTERY: ICD-10-CM

## 2018-06-29 PROCEDURE — 93880 EXTRACRANIAL BILAT STUDY: CPT | Performed by: SURGERY

## 2018-06-29 PROCEDURE — 93880 EXTRACRANIAL BILAT STUDY: CPT

## 2018-06-29 NOTE — TELEPHONE ENCOUNTER
BG log dated 6/29/18 reviewed by Dr Maeve Rivas, scanned and attached to this encounter  Patient understood new directions and advised to call office with abnormal blood sugars  [address] [place] [address] updated  Please sign off

## 2018-07-02 ENCOUNTER — HOSPITAL ENCOUNTER (OUTPATIENT)
Dept: INFUSION CENTER | Facility: HOSPITAL | Age: 53
Discharge: HOME | End: 2018-07-02
Payer: MEDICARE

## 2018-07-02 VITALS
RESPIRATION RATE: 18 BRPM | TEMPERATURE: 99 F | SYSTOLIC BLOOD PRESSURE: 132 MMHG | HEART RATE: 66 BPM | DIASTOLIC BLOOD PRESSURE: 52 MMHG

## 2018-07-02 PROCEDURE — 96366 THER/PROPH/DIAG IV INF ADDON: CPT

## 2018-07-02 PROCEDURE — 96365 THER/PROPH/DIAG IV INF INIT: CPT

## 2018-07-02 RX ADMIN — IRON SUCROSE 200 MG: 20 INJECTION, SOLUTION INTRAVENOUS at 12:22

## 2018-07-02 NOTE — PLAN OF CARE
Problem: SAFETY ADULT  Goal: Patient will remain free of falls  INTERVENTIONS:  - Assess patient frequently for physical needs  -  Identify cognitive and physical deficits and behaviors that affect risk of falls    -  [place] fall precautions as indicated by assessment   - Educate patient/family on patient safety including physical limitations  - Instruct patient to call for assistance with activity based on assessment  - Modify environment to reduce risk of injury  - Consider OT/PT consult to assist with strengthening/mobility  Outcome: Progressing    Goal: Maintain or return to baseline ADL function  INTERVENTIONS:  -  Assess patient's ability to carry out ADLs; assess patient's baseline for ADL function and identify physical deficits which impact ability to perform ADLs (bathing, care of mouth/teeth, toileting, grooming, dressing, etc )  - Assess/evaluate cause of self-care deficits   - Assess range of motion  - Assess patient's mobility; develop plan if impaired  - Assess patient's need for assistive devices and provide as appropriate  - Encourage maximum independence but intervene and supervise when necessary  ¯ Involve family in performance of ADLs  ¯ Assess for home care needs following discharge   ¯ Request OT consult to assist with ADL evaluation and planning for discharge  ¯ Provide patient education as appropriate  Outcome: Progressing    Goal: Maintain or return mobility status to optimal level  INTERVENTIONS:  - Assess patient's baseline mobility status (ambulation, transfers, stairs, etc )    - Identify cognitive and physical deficits and behaviors that affect mobility  - Identify mobility aids required to assist with transfers and/or ambulation (gait belt, sit-to-stand, lift, walker, cane, etc )  - [place] fall precautions as indicated by assessment  - Record patient progress and toleration of activity level on Mobility SBAR; progress patient to next Phase/Stage  - Instruct patient to call for assistance with activity based on assessment  - Request Rehabilitation consult to assist with strengthening/weightbearing, etc   Outcome: Progressing

## 2018-07-06 ENCOUNTER — TELEPHONE (OUTPATIENT)
Dept: NEPHROLOGY | Facility: CLINIC | Age: 53
End: 2018-07-06

## 2018-07-06 ENCOUNTER — TELEPHONE (OUTPATIENT)
Dept: ENDOCRINOLOGY | Facility: CLINIC | Age: 53
End: 2018-07-06

## 2018-07-06 DIAGNOSIS — E03.9 ACQUIRED HYPOTHYROIDISM: ICD-10-CM

## 2018-07-06 RX ORDER — LEVOTHYROXINE SODIUM 0.05 MG/1
TABLET ORAL
Qty: 12 TABLET | Refills: 0 | Status: SHIPPED | OUTPATIENT
Start: 2018-07-06 | End: 2019-01-04 | Stop reason: SDUPTHER

## 2018-07-06 RX ORDER — LEVOTHYROXINE SODIUM 112 UG/1
TABLET ORAL
Qty: 225 TABLET | Refills: 0
Start: 2018-07-06 | End: 2018-07-23

## 2018-07-06 RX ORDER — LEVOTHYROXINE SODIUM 0.05 MG/1
TABLET ORAL
Qty: 15 TABLET | Refills: 0 | Status: SHIPPED | OUTPATIENT
Start: 2018-07-06 | End: 2018-07-06 | Stop reason: SDUPTHER

## 2018-07-06 NOTE — TELEPHONE ENCOUNTER
We can keep her levothyroxine extra 50 mcg tablet to be taken on Sunday with to 112 mcg   She will take levothyroxine 112 mcg daily, except on Sunday she will take 112+ 50 mcg  I have sent the prescription to the pharmacy    Gonzalo Mckay MD

## 2018-07-07 DIAGNOSIS — I10 BENIGN ESSENTIAL HTN: Primary | ICD-10-CM

## 2018-07-08 DIAGNOSIS — E11.65 UNCONTROLLED TYPE 2 DIABETES MELLITUS WITH COMPLICATION, UNSPECIFIED WHETHER LONG TERM INSULIN USE: ICD-10-CM

## 2018-07-08 DIAGNOSIS — E11.8 UNCONTROLLED TYPE 2 DIABETES MELLITUS WITH COMPLICATION, UNSPECIFIED WHETHER LONG TERM INSULIN USE: ICD-10-CM

## 2018-07-08 RX ORDER — METOPROLOL TARTRATE 50 MG/1
TABLET, FILM COATED ORAL
Qty: 180 TABLET | Refills: 0 | OUTPATIENT
Start: 2018-07-08

## 2018-07-08 RX ORDER — INSULIN LISPRO 100 [IU]/ML
INJECTION, SOLUTION INTRAVENOUS; SUBCUTANEOUS
Qty: 15 ML | Refills: 0 | Status: CANCELLED | OUTPATIENT
Start: 2018-07-08

## 2018-07-09 ENCOUNTER — TELEPHONE (OUTPATIENT)
Dept: ENDOCRINOLOGY | Facility: CLINIC | Age: 53
End: 2018-07-09

## 2018-07-09 ENCOUNTER — TELEPHONE (OUTPATIENT)
Dept: NEPHROLOGY | Facility: CLINIC | Age: 53
End: 2018-07-09

## 2018-07-09 DIAGNOSIS — I10 BENIGN ESSENTIAL HTN: ICD-10-CM

## 2018-07-09 RX ORDER — METOPROLOL TARTRATE 50 MG/1
TABLET, FILM COATED ORAL
Qty: 180 TABLET | Refills: 5 | Status: SHIPPED | OUTPATIENT
Start: 2018-07-09 | End: 2019-10-04 | Stop reason: SDUPTHER

## 2018-07-09 RX ORDER — METOPROLOL TARTRATE 50 MG/1
50 TABLET, FILM COATED ORAL 2 TIMES DAILY
Qty: 60 TABLET | Refills: 5 | Status: SHIPPED | OUTPATIENT
Start: 2018-07-09 | End: 2018-07-09 | Stop reason: SDUPTHER

## 2018-07-09 NOTE — TELEPHONE ENCOUNTER
Called pt to review results  No answer  Will attempt back later time   Have also called [place] team

## 2018-07-09 NOTE — TELEPHONE ENCOUNTER
Current diabetic medications patient is taking:     Humalog - sliding scale  Toujeo 7 u @ HS    Blood sugars relatively well controlled, continue current regimen  Keep carbohydrate consistent with meals specially for lunch, as blood sugars variable before dinner    Jez Pat MD

## 2018-07-09 NOTE — TELEPHONE ENCOUNTER
Log dated 7/9/2018   scanned and attached to this encounter  Current diabetic medications patient is taking:    Humalog - sliding scale  Toujeo 7 u @ HS    Please review and recommend

## 2018-07-10 ENCOUNTER — HOSPITAL ENCOUNTER (OUTPATIENT)
Dept: INFUSION CENTER | Facility: HOSPITAL | Age: 53
Discharge: HOME | End: 2018-07-10
Payer: MEDICARE

## 2018-07-10 VITALS
SYSTOLIC BLOOD PRESSURE: 160 MMHG | RESPIRATION RATE: 18 BRPM | TEMPERATURE: 97 F | DIASTOLIC BLOOD PRESSURE: 60 MMHG | HEART RATE: 68 BPM

## 2018-07-10 PROCEDURE — 96365 THER/PROPH/DIAG IV INF INIT: CPT

## 2018-07-10 PROCEDURE — 96366 THER/PROPH/DIAG IV INF ADDON: CPT

## 2018-07-10 RX ADMIN — IRON SUCROSE 200 MG: 20 INJECTION, SOLUTION INTRAVENOUS at 09:57

## 2018-07-10 NOTE — PLAN OF CARE
Problem: Potential for Falls  Goal: Patient will remain free of falls  INTERVENTIONS:  - Assess patient frequently for physical needs  -  Identify cognitive and physical deficits and behaviors that affect risk of falls    -  [place] fall precautions as indicated by assessment   - Educate patient/family on patient safety including physical limitations  - Instruct patient to call for assistance with activity based on assessment  - Modify environment to reduce risk of injury  - Consider OT/PT consult to assist with strengthening/mobility   Outcome: Progressing

## 2018-07-11 ENCOUNTER — TELEPHONE (OUTPATIENT)
Dept: NEPHROLOGY | Facility: CLINIC | Age: 53
End: 2018-07-11

## 2018-07-11 ENCOUNTER — TELEPHONE (OUTPATIENT)
Dept: FAMILY MEDICINE CLINIC | Facility: CLINIC | Age: 53
End: 2018-07-11

## 2018-07-11 ENCOUNTER — TRANSCRIBE ORDERS (OUTPATIENT)
Dept: LAB | Age: 53
End: 2018-07-11

## 2018-07-11 ENCOUNTER — TRANSCRIBE ORDERS (OUTPATIENT)
Dept: ADMINISTRATIVE | Age: 53
End: 2018-07-11

## 2018-07-11 NOTE — TELEPHONE ENCOUNTER
Spoke with the patient over the phone  Blood pressures are still 170s  Have not heard back from [facility] [address] [place] as of yet  Have asked the patient to increase doxazosin to 3 mg at night  Is currently on 2 mg at night  If I do not hear back from [address] by tomorrow, I will call again, otherwise we will discuss with the vascular surgery team here regarding options to take a look further at the stenotic lesion at the kidney

## 2018-07-11 NOTE — TELEPHONE ENCOUNTER
Patient is returning call from Alex Fisher from 07/09 regarding her results and will like a call back

## 2018-07-12 ENCOUNTER — OFFICE VISIT (OUTPATIENT)
Dept: FAMILY MEDICINE CLINIC | Facility: CLINIC | Age: 53
End: 2018-07-12
Payer: MEDICARE

## 2018-07-12 VITALS
SYSTOLIC BLOOD PRESSURE: 170 MMHG | BODY MASS INDEX: 36.28 KG/M2 | HEART RATE: 68 BPM | RESPIRATION RATE: 16 BRPM | WEIGHT: 228.2 LBS | TEMPERATURE: 97.5 F | DIASTOLIC BLOOD PRESSURE: 84 MMHG

## 2018-07-12 DIAGNOSIS — E11.65 UNCONTROLLED TYPE 2 DIABETES MELLITUS WITH COMPLICATION, UNSPECIFIED WHETHER LONG TERM INSULIN USE: ICD-10-CM

## 2018-07-12 DIAGNOSIS — E11.8 UNCONTROLLED TYPE 2 DIABETES MELLITUS WITH COMPLICATION, UNSPECIFIED WHETHER LONG TERM INSULIN USE: ICD-10-CM

## 2018-07-12 DIAGNOSIS — I12.9 BENIGN HYPERTENSION WITH CKD (CHRONIC KIDNEY DISEASE) STAGE IV (HCC): ICD-10-CM

## 2018-07-12 DIAGNOSIS — N18.4 BENIGN HYPERTENSION WITH CKD (CHRONIC KIDNEY DISEASE) STAGE IV (HCC): ICD-10-CM

## 2018-07-12 DIAGNOSIS — E10.42 CONTROLLED TYPE 1 DIABETES MELLITUS WITH DIABETIC POLYNEUROPATHY (HCC): ICD-10-CM

## 2018-07-12 DIAGNOSIS — R32 URINARY INCONTINENCE, UNSPECIFIED TYPE: Primary | ICD-10-CM

## 2018-07-12 DIAGNOSIS — N39.0 RECURRENT UTI: ICD-10-CM

## 2018-07-12 DIAGNOSIS — Z94.0 STATUS POST KIDNEY TRANSPLANT: ICD-10-CM

## 2018-07-12 LAB
SL AMB  POCT GLUCOSE, UA: NORMAL
SL AMB LEUKOCYTE ESTERASE,UA: NORMAL
SL AMB POCT BILIRUBIN,UA: NORMAL
SL AMB POCT BLOOD,UA: NORMAL
SL AMB POCT CLARITY,UA: NORMAL
SL AMB POCT COLOR,UA: YELLOW
SL AMB POCT KETONES,UA: NORMAL
SL AMB POCT NITRITE,UA: NORMAL
SL AMB POCT PH,UA: 8
SL AMB POCT SPECIFIC GRAVITY,UA: 1.01
SL AMB POCT URINE PROTEIN: NORMAL
SL AMB POCT UROBILINOGEN: 0.2

## 2018-07-12 PROCEDURE — 87077 CULTURE AEROBIC IDENTIFY: CPT | Performed by: NURSE PRACTITIONER

## 2018-07-12 PROCEDURE — 87186 SC STD MICRODIL/AGAR DIL: CPT | Performed by: NURSE PRACTITIONER

## 2018-07-12 PROCEDURE — 99214 OFFICE O/P EST MOD 30 MIN: CPT | Performed by: NURSE PRACTITIONER

## 2018-07-12 PROCEDURE — 81002 URINALYSIS NONAUTO W/O SCOPE: CPT | Performed by: NURSE PRACTITIONER

## 2018-07-12 PROCEDURE — 87086 URINE CULTURE/COLONY COUNT: CPT | Performed by: NURSE PRACTITIONER

## 2018-07-12 RX ORDER — CEPHALEXIN 500 MG/1
500 CAPSULE ORAL EVERY 8 HOURS SCHEDULED
Qty: 21 CAPSULE | Refills: 0 | Status: SHIPPED | OUTPATIENT
Start: 2018-07-12 | End: 2018-07-19

## 2018-07-12 NOTE — PROGRESS NOTES
CC: painful urination, urinary incontinence    Assessment/Plan:    1  UTI- urine dip in office with + nitrates/ 3+ leuks, +/- blood  In the past, she has responded well to Keflex 500 mg Q6 hours  Given her most recent kidney function, we'll start Keflex 500 mg one tab Q 8 hours x 7 days  Increase water intake, stay hydrated  If symptoms worsen, contact our office    2  Urinary incontinence- likely secondary to UTI  This is her typical presentation of UTIs  Let our office know if this persists / worsens     3  HTN- meds recently increased by Nephrology yesterday  BP better on retake, but still elevated  She'll continue to monitor her BP at home and call either Nephrology or our office if it remains elevated    4  CKD- kidney function from 6/18/18 with BUN 25, Creat 1 79, eGFR 32  Managed by Nephrology, f/u as scheduled  Stay hydrated, avoid nephrotoxic agents     5  DM type 1- stable, managed by Endocrinology  Continue current insulin regimen     Diagnoses and all orders for this visit:    Urinary incontinence, unspecified type  -     POCT urine dip    Controlled type 1 diabetes mellitus with diabetic polyneuropathy (HCC)    Benign hypertension with CKD (chronic kidney disease) stage IV (HCC)    Recurrent UTI  -     cephalexin (KEFLEX) 500 mg capsule; Take 1 capsule (500 mg total) by mouth every 8 (eight) hours for 7 days  -     Urine culture    Status post kidney transplant          Subjective:      Patient ID: Rigoberto Montanez is a 46 y o  female      HPI   Pt presents by herself today for an acute visit   C/O urinary incontinence which started last evening  +dyrsuira, some pelvic pressure  +malodorours and cloudy urine   Denies blood in urine  No flank pain, fevers, chills, lethargy, N/V  She notes she has had urinary incontinence in the past- typically occurs with UTIs for her  Has been hospitalized in the past for UTIs     She is s/p renal transplant at [facility] in [address], now follows [facility],   Rhianna Cortez   Also with a stenotic lesion of the kidney- apparently Dr Rhianna Cortez is waiting to hear back from [facility] regarding this    CKD IV- kidney function from 6/18/18 with BUN 25, Creat 1 79, eGFR 32    Her BP meds recently increased by Dr Lucas Carlos, 7/11/18, he had her increase her Doxazosin from 2mg nightly to 3mg nightly   Also taking Amlodipine, Clonidine, Lasix, Hydralazine, Metoprolol Tartrate  She does check her BP at home   Has been running in the 160-170s recently which is why Dr Rhianna Cortez increased her Doxazosin   Denies chest pain, palpitations, edema, SOB, dizziness, headaches     Pt with DM type 1- follows with Endocrinology   Reports her blood sugars have been stable, fasting blood sugar this AM at 95  No recent episodes of hypoglycemia     The following portions of the patient's history were reviewed and updated as appropriate: allergies, current medications, past medical history, past social history and problem list     Review of Systems   Constitutional: Negative for activity change, appetite change, chills, diaphoresis, fatigue and fever  Respiratory: Negative for chest tightness, shortness of breath and wheezing  Cardiovascular: Negative for chest pain, palpitations and leg swelling  Gastrointestinal: Negative for abdominal pain, constipation, diarrhea and nausea  Genitourinary: Positive for dysuria, frequency, pelvic pain (pelvic pressure) and urgency  Negative for decreased urine volume, difficulty urinating, flank pain, hematuria, menstrual problem, vaginal bleeding, vaginal discharge and vaginal pain  Skin: Negative for wound  Neurological: Negative for dizziness and headaches  Hematological: Negative for adenopathy  Does not bruise/bleed easily           Objective:      /84 (BP Location: Left arm, Patient Position: Sitting)   Pulse 68   Temp 97 5 °F (36 4 °C) (Tympanic)   Resp 16   Wt 104 kg (228 lb 3 2 oz)   LMP  (LMP Unknown)   BMI 36 28 kg/m² Physical Exam   Constitutional: She is oriented to person, place, and time  She appears well-developed and well-nourished  No distress  HENT:   Head: Normocephalic and atraumatic  Eyes: Pupils are equal, round, and reactive to light  Cardiovascular: Normal rate, regular rhythm and normal heart sounds  No murmur heard  No LE edema   Pulmonary/Chest: Effort normal and breath sounds normal  No respiratory distress  She has no wheezes  Abdominal: Soft  Bowel sounds are normal  She exhibits no distension, no ascites and no mass  There is no hepatosplenomegaly  There is no tenderness  There is no rebound, no guarding and no CVA tenderness  Musculoskeletal:   Ambulates with a cane   Neurological: She is oriented to person, place, and time  Skin: Skin is warm and dry  She is not diaphoretic  Psychiatric: She has a normal mood and affect

## 2018-07-12 NOTE — PATIENT INSTRUCTIONS
Start Keflex 500 mg one tab every 8 hours for 7 days  Increase water intake  Continue to check your blood pressure daily (call our office or Nephrology if BP remains elevated)  Continue to monitor your blood sugars daily   Call office with any questions/ concerns or if symptoms worsen/ persist

## 2018-07-13 RX ORDER — INSULIN LISPRO 100 [IU]/ML
INJECTION, SOLUTION INTRAVENOUS; SUBCUTANEOUS
Qty: 45 ML | Refills: 2 | Status: SHIPPED | OUTPATIENT
Start: 2018-07-13 | End: 2019-01-11 | Stop reason: SDUPTHER

## 2018-07-14 LAB — BACTERIA UR CULT: ABNORMAL

## 2018-07-16 ENCOUNTER — TELEPHONE (OUTPATIENT)
Dept: FAMILY MEDICINE CLINIC | Facility: CLINIC | Age: 53
End: 2018-07-16

## 2018-07-17 DIAGNOSIS — F41.1 GAD (GENERALIZED ANXIETY DISORDER): Primary | ICD-10-CM

## 2018-07-17 PROBLEM — E11.40 NEUROPATHY IN DIABETES (HCC): Status: ACTIVE | Noted: 2018-07-17

## 2018-07-17 RX ORDER — BUSPIRONE HYDROCHLORIDE 5 MG/1
5 TABLET ORAL 2 TIMES DAILY
Qty: 180 TABLET | Refills: 0 | Status: SHIPPED | OUTPATIENT
Start: 2018-07-17 | End: 2018-10-24 | Stop reason: SDUPTHER

## 2018-07-20 DIAGNOSIS — K21.9 GASTROESOPHAGEAL REFLUX DISEASE WITHOUT ESOPHAGITIS: ICD-10-CM

## 2018-07-20 DIAGNOSIS — N18.4 CKD (CHRONIC KIDNEY DISEASE) STAGE 4, GFR 15-29 ML/MIN (HCC): Primary | ICD-10-CM

## 2018-07-20 RX ORDER — CLINDAMYCIN HYDROCHLORIDE 300 MG/1
CAPSULE ORAL
Qty: 15 CAPSULE | Refills: 0 | Status: SHIPPED | OUTPATIENT
Start: 2018-07-20 | End: 2018-07-23

## 2018-07-20 RX ORDER — PANTOPRAZOLE SODIUM 40 MG/1
TABLET, DELAYED RELEASE ORAL
Qty: 90 TABLET | Refills: 0 | OUTPATIENT
Start: 2018-07-20

## 2018-07-20 NOTE — TELEPHONE ENCOUNTER
Dr Van Mariee called requesting a refill for a few days for Clindamycin  She stated that she will be having several dental procedures within the next few days and will need enough to cover her

## 2018-07-23 ENCOUNTER — HOSPITAL ENCOUNTER (EMERGENCY)
Facility: HOSPITAL | Age: 53
Discharge: HOME | End: 2018-07-23
Attending: EMERGENCY MEDICINE
Payer: MEDICARE

## 2018-07-23 ENCOUNTER — APPOINTMENT (EMERGENCY)
Dept: RADIOLOGY | Facility: HOSPITAL | Age: 53
End: 2018-07-23
Payer: MEDICARE

## 2018-07-23 ENCOUNTER — OFFICE VISIT (OUTPATIENT)
Dept: FAMILY MEDICINE CLINIC | Facility: CLINIC | Age: 53
End: 2018-07-23
Payer: MEDICARE

## 2018-07-23 VITALS
DIASTOLIC BLOOD PRESSURE: 73 MMHG | SYSTOLIC BLOOD PRESSURE: 175 MMHG | HEART RATE: 66 BPM | TEMPERATURE: 97.8 F | RESPIRATION RATE: 16 BRPM | OXYGEN SATURATION: 94 %

## 2018-07-23 VITALS
SYSTOLIC BLOOD PRESSURE: 184 MMHG | DIASTOLIC BLOOD PRESSURE: 58 MMHG | HEART RATE: 72 BPM | WEIGHT: 228.2 LBS | RESPIRATION RATE: 20 BRPM | BODY MASS INDEX: 36.28 KG/M2 | TEMPERATURE: 97.8 F

## 2018-07-23 DIAGNOSIS — I12.9 BENIGN HYPERTENSION WITH CKD (CHRONIC KIDNEY DISEASE) STAGE IV (HCC): ICD-10-CM

## 2018-07-23 DIAGNOSIS — L03.213 PERIORBITAL CELLULITIS OF LEFT EYE: Primary | ICD-10-CM

## 2018-07-23 DIAGNOSIS — Z94.0 STATUS POST KIDNEY TRANSPLANT: ICD-10-CM

## 2018-07-23 DIAGNOSIS — E10.42 CONTROLLED TYPE 1 DIABETES MELLITUS WITH DIABETIC POLYNEUROPATHY (HCC): ICD-10-CM

## 2018-07-23 DIAGNOSIS — N18.4 BENIGN HYPERTENSION WITH CKD (CHRONIC KIDNEY DISEASE) STAGE IV (HCC): ICD-10-CM

## 2018-07-23 LAB
ANION GAP SERPL CALCULATED.3IONS-SCNC: 8 MMOL/L (ref 4–13)
BASOPHILS # BLD AUTO: 0.05 THOUSANDS/ΜL (ref 0–0.1)
BASOPHILS NFR BLD AUTO: 1 % (ref 0–1)
BUN SERPL-MCNC: 35 MG/DL (ref 5–25)
CALCIUM SERPL-MCNC: 9.6 MG/DL (ref 8.3–10.1)
CHLORIDE SERPL-SCNC: 106 MMOL/L (ref 100–108)
CO2 SERPL-SCNC: 25 MMOL/L (ref 21–32)
CREAT SERPL-MCNC: 1.76 MG/DL (ref 0.6–1.3)
EOSINOPHIL # BLD AUTO: 0.19 THOUSAND/ΜL (ref 0–0.61)
EOSINOPHIL NFR BLD AUTO: 3 % (ref 0–6)
ERYTHROCYTE [DISTWIDTH] IN BLOOD BY AUTOMATED COUNT: 22.2 % (ref 11.6–15.1)
GFR SERPL CREATININE-BSD FRML MDRD: 32 ML/MIN/1.73SQ M
GLUCOSE SERPL-MCNC: 105 MG/DL (ref 65–140)
HCT VFR BLD AUTO: 35.5 % (ref 34.8–46.1)
HGB BLD-MCNC: 10.7 G/DL (ref 11.5–15.4)
IMM GRANULOCYTES # BLD AUTO: 0.07 THOUSAND/UL (ref 0–0.2)
IMM GRANULOCYTES NFR BLD AUTO: 1 % (ref 0–2)
LYMPHOCYTES # BLD AUTO: 1.49 THOUSANDS/ΜL (ref 0.6–4.47)
LYMPHOCYTES NFR BLD AUTO: 21 % (ref 14–44)
MCH RBC QN AUTO: 27.7 PG (ref 26.8–34.3)
MCHC RBC AUTO-ENTMCNC: 30.1 G/DL (ref 31.4–37.4)
MCV RBC AUTO: 92 FL (ref 82–98)
MONOCYTES # BLD AUTO: 0.5 THOUSAND/ΜL (ref 0.17–1.22)
MONOCYTES NFR BLD AUTO: 7 % (ref 4–12)
NEUTROPHILS # BLD AUTO: 4.91 THOUSANDS/ΜL (ref 1.85–7.62)
NEUTS SEG NFR BLD AUTO: 67 % (ref 43–75)
NRBC BLD AUTO-RTO: 0 /100 WBCS
PLATELET # BLD AUTO: 191 THOUSANDS/UL (ref 149–390)
PMV BLD AUTO: 10.6 FL (ref 8.9–12.7)
POTASSIUM SERPL-SCNC: 3.9 MMOL/L (ref 3.5–5.3)
RBC # BLD AUTO: 3.86 MILLION/UL (ref 3.81–5.12)
SODIUM SERPL-SCNC: 139 MMOL/L (ref 136–145)
WBC # BLD AUTO: 7.21 THOUSAND/UL (ref 4.31–10.16)

## 2018-07-23 PROCEDURE — 70486 CT MAXILLOFACIAL W/O DYE: CPT

## 2018-07-23 PROCEDURE — 85025 COMPLETE CBC W/AUTO DIFF WBC: CPT | Performed by: EMERGENCY MEDICINE

## 2018-07-23 PROCEDURE — 36415 COLL VENOUS BLD VENIPUNCTURE: CPT | Performed by: EMERGENCY MEDICINE

## 2018-07-23 PROCEDURE — 80048 BASIC METABOLIC PNL TOTAL CA: CPT | Performed by: EMERGENCY MEDICINE

## 2018-07-23 PROCEDURE — 99214 OFFICE O/P EST MOD 30 MIN: CPT | Performed by: FAMILY MEDICINE

## 2018-07-23 PROCEDURE — 99284 EMERGENCY DEPT VISIT MOD MDM: CPT

## 2018-07-23 RX ORDER — PANTOPRAZOLE SODIUM 40 MG/1
40 TABLET, DELAYED RELEASE ORAL DAILY
COMMUNITY
End: 2019-01-14 | Stop reason: ALTCHOICE

## 2018-07-23 RX ORDER — CEPHALEXIN 250 MG/1
500 CAPSULE ORAL 4 TIMES DAILY
Qty: 80 CAPSULE | Refills: 0 | Status: SHIPPED | OUTPATIENT
Start: 2018-07-23 | End: 2018-08-02

## 2018-07-23 RX ORDER — SULFAMETHOXAZOLE AND TRIMETHOPRIM 800; 160 MG/1; MG/1
1 TABLET ORAL DAILY
Qty: 10 TABLET | Refills: 0 | Status: SHIPPED | OUTPATIENT
Start: 2018-07-23 | End: 2018-08-02

## 2018-07-23 NOTE — PROGRESS NOTES
Chief Complaint   Patient presents with    Facial Pain     Facial pain in the left side, due to dental work, since 3 days  Assessment/Plan:    Periorbital cellulitis of left eye  Patient presents with periorbital cellulitis of L eye  Will refer patient to [facility] Emergency room for evaluation due to complicated medical history, including kidney transplant, Type 1 DM, CKD stage 4, history of allergies to multiple antibiotics  Diagnoses and all orders for this visit:    Periorbital cellulitis of left eye  -     Ambulatory Referral to Emergency Medicine; Future  -     Transfer to other facility    Benign hypertension with CKD (chronic kidney disease) stage IV ([facility] [address] )  -     Ambulatory Referral to Emergency Medicine; Future  -     Transfer to other facility    Status post kidney transplant  -     Ambulatory Referral to Emergency Medicine; Future  -     Transfer to other facility    Controlled type 1 diabetes mellitus with diabetic polyneuropathy [facility])  -     Ambulatory Referral to Emergency Medicine; Future  -     Transfer to other facility          Subjective:      Patient ID: Redd Mascorro is a 46 y o  female  HPI     Patient is 79-year-old female with Type 1 DM with diabetic neuropathy, CKD stage 4, H/o kidney and pancreatic transplant in 1998, indolent multiple myeloma who presents to the office c/o left-sided facial swelling, tenderness over the left cheek and around her left eye, some purulent discharge from left eye for the last 3 days  Patient had a dental work done on July 12, 2018  She was told by her dentist that she would need to have teeth extraction, treatmen but nephrologist Dr Zeenat Mohamud   t of the cavities  Patient denies fever, chills  No changes in medications  Patient has been followed by endocrinologist Dr Luciano Duarte, nephrologist Dr Zeenat Mohamud      The following portions of the patient's history were reviewed and updated as appropriate: current medications, past family history, past medical history, past social history, past surgical history and problem list     Review of Systems   Constitutional: Positive for fatigue  Negative for activity change, chills and fever  HENT: Positive for dental problem and facial swelling  Negative for congestion, ear pain, hearing loss, nosebleeds, sore throat, tinnitus and trouble swallowing  Eyes: Negative for pain, discharge, redness, itching and visual disturbance  Respiratory: Negative for cough, chest tightness and shortness of breath  Cardiovascular: Negative for chest pain, palpitations and leg swelling  Gastrointestinal: Negative for abdominal pain, diarrhea, nausea and vomiting  Genitourinary: Negative for difficulty urinating, dysuria and hematuria  Musculoskeletal: Positive for arthralgias and gait problem (patient ambuulates with a cane  )  Skin: Negative for wound  Bruises an arms   Neurological: Positive for headaches  Negative for dizziness, syncope and speech difficulty  Hematological: Negative for adenopathy  Bruises/bleeds easily  Objective:      BP (!) 184/58 (BP Location: Left arm, Patient Position: Sitting, Cuff Size: Standard)   Pulse 72   Temp 97 8 °F (36 6 °C) (Tympanic)   Resp 20   Wt 104 kg (228 lb 3 2 oz)   LMP  (LMP Unknown)   BMI 36 28 kg/m²          Physical Exam   Constitutional: She is oriented to person, place, and time  Obese   HENT:   Head: Normocephalic and atraumatic  Right Ear: External ear normal    Left Ear: External ear normal    Nose: Nose normal    Mouth/Throat: Oropharynx is clear and moist    Eyes: Conjunctivae are normal  Pupils are equal, round, and reactive to light  Left eye exhibits discharge (purulent discharge)  There is periorbital swelling around L eye, tenderness to palpation   Neck: Normal range of motion  Neck supple  Cardiovascular: Normal rate, regular rhythm and normal heart sounds  No murmur heard    No BL LE edema   Pulmonary/Chest: Effort normal and breath sounds normal  She has no wheezes  Musculoskeletal:   Patient ambulates with a cane   Lymphadenopathy:     She has no cervical adenopathy  Neurological: She is alert and oriented to person, place, and time  No cranial nerve deficit  Skin: Skin is warm and dry  Skin erythema on L side of the face, swelling, tenderness over L cheek   Psychiatric: She has a normal mood and affect  Nursing note and vitals reviewed

## 2018-07-23 NOTE — DISCHARGE INSTRUCTIONS
Please come back to the ED if your symptoms worsen  Please come back to the ED or to your PCP in 2 days for follow up  I prescribed Bactrim, which should be taken once per day  I have also prescribed Keflex, which should be taken 4 times daily  Periorbital Cellulitis in Adults   WHAT YOU NEED TO KNOW:   Periorbital cellulitis is an infection of the skin and tissues in the front of your eye  The infection can quickly cause vision problems  It can spread to your brain and cause meningitis  Periorbital cellulitis must be treated immediately to prevent serious complications  DISCHARGE INSTRUCTIONS:   Seek care immediately if:   · You lose vision in your infected eye  · You have vision problems, such as double vision  · You have difficulty moving your eyeball  · You cannot close your eye due to swelling  · You have a fever  · You develop a headache and are vomiting  · You have a stiff neck  Contact your healthcare provider if:   · Your symptoms do not get better within 24 hours of treatment  · Your symptoms return  · You have questions or concerns about your condition or care  Medicines:   · Antibiotics  are used to treat a bacterial infection  · Take your medicine as directed  Contact your healthcare provider if you think your medicine is not helping or if you have side effects  Tell him or her if you are allergic to any medicine  Keep a list of the medicines, vitamins, and herbs you take  Include the amounts, and when and why you take them  Bring the list or the pill bottles to follow-up visits  Carry your medicine list with you in case of an emergency  Follow up with your healthcare provider as directed: You may be referred to other healthcare providers  You may also need more treatment  Write down your questions so you remember to ask them during your visits  Keep your eyes clean:  Clean your eyes with warm water daily and as needed   Wash your hands with soap and water before and after you clean your eyes  Protect your eyes:  Do not scratch or rub your eyes  © 2017 [employer]  Information is for End User's use only and may not be sold, redistributed or otherwise used for commercial purposes  All illustrations and images included in CareNotes® are the copyrighted property of [employer]  or [employer]  The above information is an  only  It is not intended as medical advice for individual conditions or treatments  Talk to your doctor, nurse or pharmacist before following any medical regimen to see if it is safe and effective for you

## 2018-07-23 NOTE — ASSESSMENT & PLAN NOTE
Patient presents with periorbital cellulitis of L eye  Will refer patient to [address] [place] [address] Emergency room for evaluation due to complicated medical history, including kidney transplant, Type 1 DM, CKD stage 4, history of allergies to multiple antibiotics

## 2018-07-23 NOTE — ED ATTENDING ATTESTATION
Francisco Gandhi MD, saw and evaluated the patient  I have discussed the patient with the resident/non-physician practitioner and agree with the resident's/non-physician practitioner's findings, Plan of Care, and MDM as documented in the resident's/non-physician practitioner's note, except where noted  All available labs and Radiology studies were reviewed  At this point I agree with the current assessment done in the Emergency Department  I have conducted an independent evaluation of this patient a history and physical is as follows:    Patient reports she was in her normal state of health until approximately 3 days ago when she noticed mild swelling and erythema at the junction of the lower and upper lid laterally  The erythema and swelling were mostly in the area of the lower eyelid  Since that time the area has gradually become more swollen, more red, and more tender  The patient denies any change in vision, eye pain, or pain with movement of the eyes  The patient does admit to mild left retro-orbital discomfort  The patient finished Keflex 4 days ago for urinary tract infection  The patient denies any right eye complaints  Physical exam demonstrates a pleasant alert nontoxic female in no acute distress  The left eye is normal   The right eye is normal in appearance with normal light reflex and has a full range of motion with extraocular muscles  There is no pain with ranging the extraocular muscles and there is no diplopia in any visual field  There is no conjunctival injection  There is mild erythema and swelling at the lateral right lower lid  There is no increased warmth, no fluctuance, and no induration  There are no streaks coming away from the area of erythema    The upper eyelid appears normal  The patient had a normal neurologic exam       Critical Care Time  CritCare Time    Procedures

## 2018-07-23 NOTE — ED PROVIDER NOTES
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, multiple myeloma, paroxysmal atrial fibrillation not anticoagulated, CKD stage 4, hypertension, anxiety, depression, Kaiser's esophagus presenting today with left lateral orbital edema the last 3 days  Patient notes that initially she saw swelling lateral to her left eye on 3 days ago, since that point her eyes started to drain clear fluid with crystals in it  Patient notes that she has pain lateral to the left eye  Patient admits pressure in behind her left eye when she leans forward  Patient denies double vision, injected conjunctiva  Patient admits decreased acuity of vision in her left eye  Patient finished a round of Keflex for a urinary tract infection 4 days ago approximately  Patient was sent here by her primary care provider who was sent her here because of her immunosuppression and possible orbital involvement  Patient denies fevers, chills, nausea, vomiting, chest pain, palpitations, shortness breath, dizziness, weakness, numbness, tingling  Prior to Admission Medications   Prescriptions Last Dose Informant Patient Reported? Taking?    ARIPiprazole (ABILIFY) 20 MG tablet  Self Yes Yes   Sig: Take 1 tablet by mouth   BD PEN NEEDLE VISHNU U/F 32G X 4 MM MISC  Self No Yes   Sig: USE FOUR TIMES DAILY   Cholecalciferol (VITAMIN D3) 1000 units CAPS  Self Yes Yes   Sig: Take by mouth   DULoxetine (CYMBALTA) 60 mg delayed release capsule  Self No Yes   Sig: TAKE ONE CAPSULE BY MOUTH TWICE DAILY   HUMALOG KWIKPEN 100 units/mL injection pen  Self No Yes   Sig: INJCT INSULIN PER SLIDING SCALE, IF BLOOD SUGAR 150-200=2UNITS, 201-250=4UNITS, 251-300=6UNITS, 301-350=8UNITS>350=10UNITS   Insulin Glargine (TOUJEO SOLOSTAR) 300 units/mL CONCETRATED U-300 injection pen  Self No Yes Sig: Inject 7 Units under the skin daily at bedtime   LORazepam (ATIVAN) 2 mg tablet  Self No Yes   Sig: Take 1 tablet (2 mg total) by mouth 3 (three) times a day as needed for anxiety for up to 180 days   Lancets (ONETOUCH ULTRASOFT) lancets  Self Yes Yes   Sig: by Does not apply route 4 (four) times a day     ONE TOUCH ULTRA TEST test strip  Self No Yes   Sig: Use 4 times daily ( please dispense One touch Ultra test strips)   amLODIPine (NORVASC) 10 mg tablet  Self No Yes   Sig: 10mg in the morning & 5mg in the evening   aspirin 81 MG tablet  Self Yes Yes   Sig: Take 1 tablet by mouth daily   busPIRone (BUSPAR) 5 mg tablet  Self No Yes   Sig: Take 1 tablet (5 mg total) by mouth 2 (two) times a day for 90 days   cloNIDine (CATAPRES) 0 1 mg tablet  Self Yes Yes   Sig: Take 0 3 mg by mouth every 8 (eight) hours     doxazosin (CARDURA) 1 mg tablet  Self No Yes   Sig: Take 2 tablets (2 mg total) by mouth daily at bedtime   Patient taking differently: Take 3 mg by mouth daily at bedtime     folic acid (FOLVITE) 1 mg tablet  Self Yes Yes   Sig: Take 1 tablet by mouth daily   furosemide (LASIX) 20 mg tablet  Self No Yes   Sig: Take 1 tablet (20 mg total) by mouth daily   hydrALAZINE (APRESOLINE) 50 mg tablet  Self No Yes   Sig: TAKE 1 TABLET BY MOUTH THREE TIMES DAILY   levothyroxine 50 mcg tablet  Self No Yes   Sig: TAKE 1 TABLET BY MOUTH ONLY ON SUNDAYS, TAKE WITH 112 MCG DOSE   metoprolol tartrate (LOPRESSOR) 50 mg tablet  Self No Yes   Sig: TAKE 1 TABLET(50MG TOTAL) BY MOUTH TWICE DAILY   pantoprazole (PROTONIX) 40 mg tablet   Yes Yes   Sig: Take 40 mg by mouth daily   pravastatin (PRAVACHOL) 80 mg tablet  Self No Yes   Sig: TAKE 1 TABLET BY MOUTH DAILY   predniSONE 5 mg tablet  Self Yes Yes   Sig: Take 1 tablet by mouth daily   rOPINIRole (REQUIP) 0 25 mg tablet  Self Yes Yes   Sig: Take 1 tablet by mouth 2 (two) times a day   sertraline (ZOLOFT) 100 mg tablet  Self No Yes   Sig: Take 2 tablets (200 mg total) by mouth daily for 90 days   sodium bicarbonate 650 mg tablet  Self Yes Yes   Sig: Take 2 tablets by mouth 2 (two) times a day     tacrolimus (PROGRAF) 1 mg capsule  Self Yes Yes   Sig: Take 3 mg by mouth daily 3 mg BID       Facility-Administered Medications: None       Past Medical History:   Diagnosis Date    Abnormal liver function test     Acute kidney injury ([place] [address] )     Acute on chronic congestive heart failure (HCC)     Allergic urticaria     Anemia     Cancer (HCC)     Multiple myeloma    Cervical dysplasia     Cholelithiasis     Chronic diastolic (congestive) heart failure ([place] [address] ) 2017    Diabetes mellitus ([place] [address] )     Previous, controlled with diet    Diabetes mellitus with foot ulcer ([place] [address] )     Disease of thyroid gland     Encephalopathy     Hematuria     + leak est- secondary to UTIs/panc drainage    History of transfusion     Hyperkalemia     Hypertension     Iliotibial band syndrome     Lumbar radiculopathy     Night blindness     Nonrheumatic aortic (valve) insufficiency     Pneumonia     Renal disorder     Retinopathy     Seborrhea     Seizure ([place] [address] )     Sinus tachycardia     B blocker - cardio echo stress test 02 normal/neg LE doppler  OK and     Status post simultaneous kidney and pancreas transplant ([place] [address] )     Toe amputation status ([facility])     Trochanteric bursitis        Past Surgical History:   Procedure Laterality Date    CATARACT EXTRACTION      CHOLECYSTECTOMY      COLONOSCOPY      two polyps in the rectum removed and biopsied diverticulosis in the sigmoid colon, external hemorrhoiods- Dr Barfield    COMBINED KIDNEY-PANCREAS TRANSPLANT N/A     CYSTOSCOPY N/A 10/13/2016    Procedure: CYSTOSCOPY, retrograde pyelogram, biopsy of ureteral polyp; Surgeon: Jaime Luther MD;  Location: [facility];  Service:    [employer] DILATION AND CURETTAGE OF UTERUS      ESOPHAGOGASTRODUODENOSCOPY N/A 2017    Procedure: ESOPHAGOGASTRODUODENOSCOPY (EGD);   Surgeon: Louretta Apgar, DO;  Location: [facility]; Service: Gastroenterology    EYE SURGERY      cataracts    FOOT AMPUTATION THROUGH METATARSAL Left     FOOT SURGERY Right     excision of metatarsal heads    HALLUX VALGUS CORRECTION Right     NEPHRECTOMY TRANSPLANTED ORGAN      PANCREATIC TRANSPLANT REMOVAL  1998       Family History   Problem Relation Age of Onset    Hypertension Mother     Cancer Mother     Hypertension Father     Cancer Father     Cancer Maternal Grandfather     Cancer Paternal Grandmother     Cancer Paternal Grandfather     Depression Sister     Breast cancer Maternal Grandmother     Cancer Other      I have reviewed and agree with the history as documented  Social History   Substance Use Topics    Smoking status: Former Smoker     Quit date: 4/28/2012    Smokeless tobacco: Never Used    Alcohol use No      Comment: (history)        Review of Systems   Constitutional: Negative for chills and fever  HENT: Negative for ear pain, rhinorrhea and sore throat  Eyes: Positive for pain and visual disturbance (Decreased acuity in left eye)  Negative for photophobia and redness  Eye discharge:  serous discharge  Respiratory: Negative for cough, chest tightness and shortness of breath  Cardiovascular: Negative for chest pain, palpitations and leg swelling  Gastrointestinal: Positive for constipation ( normal constipation for this patient)  Negative for abdominal pain, blood in stool, diarrhea, nausea and vomiting  Endocrine: Negative for polyuria  Genitourinary: Negative for dysuria, frequency, hematuria and urgency  Musculoskeletal: Negative for arthralgias  Skin: Negative for rash  Neurological: Negative for dizziness, weakness and numbness  Psychiatric/Behavioral: The patient is not nervous/anxious  All other systems reviewed and are negative        Physical Exam  ED Triage Vitals   Temperature Pulse Respirations Blood Pressure SpO2   07/23/18 1108 07/23/18 1110 07/23/18 1110 07/23/18 1116 07/23/18 1110   97 8 °F (36 6 °C) 73 20 (!) 198/81 100 %      Temp Source Heart Rate Source Patient Position - Orthostatic VS BP Location FiO2 (%)   07/23/18 1108 07/23/18 1110 07/23/18 1110 07/23/18 1110 --   Tympanic Monitor Sitting Left arm       Pain Score       07/23/18 1110       5           Orthostatic Vital Signs  Vitals:    07/23/18 1116 07/23/18 1121 07/23/18 1409 07/23/18 1606   BP: (!) 198/81 149/61 (!) 206/91 (!) 175/73   Pulse:  65 71 66   Patient Position - Orthostatic VS:   Lying Lying       Physical Exam   Constitutional: She appears well-developed and well-nourished  No distress  HENT:   Head: Atraumatic  Right Ear: External ear normal    Left Ear: External ear normal    Left nostril productive of serous drainage  Considerable edema lateral to the left eye, as well as inferior and superior to that point   Slight erythema lateral to the left eye  Pain with pressing on the edematous area  Eyes: Conjunctivae and EOM are normal  Right eye exhibits discharge  Left eye exhibits no discharge  Left eye producing serous drainage  No conjunctival injection  Neck: No JVD present  No tracheal deviation present  Cardiovascular: Normal rate, regular rhythm, normal heart sounds and intact distal pulses  No murmur heard  Pulmonary/Chest: Breath sounds normal  No stridor  No respiratory distress  She has no wheezes  She has no rales  Abdominal: Soft  Bowel sounds are normal  She exhibits no distension and no mass  There is no tenderness  There is no rebound and no guarding  Genitourinary:   Genitourinary Comments: Deferred   Musculoskeletal: She exhibits no tenderness or deformity  Neurological: She exhibits normal muscle tone  Coordination normal    Extraocular muscles intact without gross irregularities on movements  Skin: Skin is warm and dry  Capillary refill takes less than 2 seconds  No rash noted  She is not diaphoretic  No erythema  No pallor     Psychiatric: She has a normal mood and affect  Her behavior is normal  Judgment and thought content normal        ED Medications  Medications - No data to display    Diagnostic Studies  Results Reviewed     Procedure Component Value Units Date/Time    Basic metabolic panel [[id number]]  (Abnormal) Collected:  07/23/18 1324    Lab Status:  Final result Specimen:  Blood from Arm, Right Updated:  07/23/18 1358     Sodium 139 mmol/L      Potassium 3 9 mmol/L      Chloride 106 mmol/L      CO2 25 mmol/L      Anion Gap 8 mmol/L      BUN 35 (H) mg/dL      Creatinine 1 76 (H) mg/dL      Glucose 105 mg/dL      Calcium 9 6 mg/dL      eGFR 32 ml/min/1 73sq m     Narrative:         National Kidney Disease Education Program recommendations are as follows:  GFR calculation is accurate only with a steady state creatinine  Chronic Kidney disease less than 60 ml/min/1 73 sq  meters  Kidney failure less than 15 ml/min/1 73 sq  meters      CBC and differential [[id number]]  (Abnormal) Collected:  07/23/18 1324    Lab Status:  Final result Specimen:  Blood from Arm, Right Updated:  07/23/18 1338     WBC 7 21 Thousand/uL      RBC 3 86 Million/uL      Hemoglobin 10 7 (L) g/dL      Hematocrit 35 5 %      MCV 92 fL      MCH 27 7 pg      MCHC 30 1 (L) g/dL      RDW 22 2 (H) %      MPV 10 6 fL      Platelets 594 Thousands/uL      nRBC 0 /100 WBCs      Neutrophils Relative 67 %      Immat GRANS % 1 %      Lymphocytes Relative 21 %      Monocytes Relative 7 %      Eosinophils Relative 3 %      Basophils Relative 1 %      Neutrophils Absolute 4 91 Thousands/µL      Immature Grans Absolute 0 07 Thousand/uL      Lymphocytes Absolute 1 49 Thousands/µL      Monocytes Absolute 0 50 Thousand/µL      Eosinophils Absolute 0 19 Thousand/µL      Basophils Absolute 0 05 Thousands/µL                  CT facial bones without contrast   Final Result by Alice Leroy MD (07/23 1527)      Mild left periorbital/infraorbital soft tissue swelling likely infectious or inflammatory in nature compatible with the patient's history of periorbital cellulitis  However, there is no post septal inflammatory stranding to suggest orbital cellulitis  Workstation performed: [id number]               Procedures  Procedures      Phone Consults  ED Phone Contact    ED Course                               MDM  Number of Diagnoses or Management Options  Periorbital cellulitis of left eye:   Diagnosis management comments: Immunosuppressed 62-year-old female presenting with edema and erythema lateral to the left eye with serous drainage in her eye  Differential diagnosis includes but is not limited only to periorbital cellulitis or orbital cellulitis, retrobulbar abscess  Will proceed with a visual acuity test, blood work including CBC, BMP, and CT max facial  Unable to use contrast, so I notified the patient the image will not be quite as accurate as we'd prefer  No acute findings on CT  Patient discharged on 10 days of Keflex and Bactrim  Patient told that the infection could spread into orbits and to come directly back to the ED if she has fevers, increased pressure behind her orbits, mucopurulent discharge, or worsening vision  The patient will follow up with ED or PCP in 2 days  CritCare Time    Disposition  Final diagnoses:   Periorbital cellulitis of left eye     Time reflects when diagnosis was documented in both MDM as applicable and the Disposition within this note     Time User Action Codes Description Comment    7/23/2018  3:59 PM Kathern Severs Add [[id number]] Periorbital cellulitis of left eye       ED Disposition     ED Disposition Condition Comment    Discharge  Clari Lies discharge to home/self care      Condition at discharge: Good        Follow-up Information     Follow up With Specialties Details Why Contact Info Additional Information    Willy Pinon MD Family Medicine In 2 days  [address] [facility]  [phone]       Christi Black [facility] Emergency Department Emergency Medicine  If symptoms worsen [address]  [phone]  ED, [address] [address], [place], [place], [address]          Discharge Medication List as of 7/23/2018  4:09 PM      START taking these medications    Details   cephalexin (KEFLEX) 250 mg capsule Take 2 capsules (500 mg total) by mouth 4 (four) times a day for 10 days, Starting Mon 7/23/2018, Until Thu 8/2/2018, Print      sulfamethoxazole-trimethoprim (BACTRIM DS) 800-160 mg per tablet Take 1 tablet by mouth daily for 10 days smx-tmp DS (BACTRIM) 800-160 mg tabs (1tab q12 D10), Starting Mon 7/23/2018, Until Thu 8/2/2018, Print         CONTINUE these medications which have NOT CHANGED    Details   amLODIPine (NORVASC) 10 mg tablet 10mg in the morning & 5mg in the evening, Normal      ARIPiprazole (ABILIFY) 20 MG tablet Take 1 tablet by mouth, Starting Thu 2/18/2016, Historical Med      aspirin 81 MG tablet Take 1 tablet by mouth daily, Starting Wed 6/5/2013, Historical Med      BD PEN NEEDLE VISHNU U/F 32G X 4 MM MISC USE FOUR TIMES DAILY, Normal      busPIRone (BUSPAR) 5 mg tablet Take 1 tablet (5 mg total) by mouth 2 (two) times a day for 90 days, Starting Tue 7/17/2018, Until Mon 10/15/2018, Normal      Cholecalciferol (VITAMIN D3) 1000 units CAPS Take by mouth, Historical Med      cloNIDine (CATAPRES) 0 1 mg tablet Take 0 3 mg by mouth every 8 (eight) hours  , Starting Tue 11/7/2017, Historical Med      doxazosin (CARDURA) 1 mg tablet Take 2 tablets (2 mg total) by mouth daily at bedtime, Starting Tue 6/26/2018, Normal      DULoxetine (CYMBALTA) 60 mg delayed release capsule TAKE ONE CAPSULE BY MOUTH TWICE DAILY, Normal      folic acid (FOLVITE) 1 mg tablet Take 1 tablet by mouth daily, Starting Mon 6/17/2013, Historical Med      furosemide (LASIX) 20 mg tablet Take 1 tablet (20 mg total) by mouth daily, Starting Mon 5/7/2018, Normal      HUMALOG KWIKPEN 100 units/mL injection pen INJCT INSULIN PER SLIDING SCALE, IF BLOOD SUGAR 150-200=2UNITS, 201-250=4UNITS, 251-300=6UNITS, 301-350=8UNITS>350=10UNITS, Normal      hydrALAZINE (APRESOLINE) 50 mg tablet TAKE 1 TABLET BY MOUTH THREE TIMES DAILY, Normal      Insulin Glargine (TOUJEO SOLOSTAR) 300 units/mL CONCETRATED U-300 injection pen Inject 7 Units under the skin daily at bedtime, Starting Tue 7/3/2018, No Print      Lancets (ONETOUCH ULTRASOFT) lancets by Does not apply route 4 (four) times a day  , Starting Wed 3/9/2016, Historical Med      levothyroxine 50 mcg tablet TAKE 1 TABLET BY MOUTH ONLY ON SUNDAYS, TAKE WITH 112 MCG DOSE, Normal      LORazepam (ATIVAN) 2 mg tablet Take 1 tablet (2 mg total) by mouth 3 (three) times a day as needed for anxiety for up to 180 days, Starting Tue 6/26/2018, Until Sun 12/23/2018, Normal      metoprolol tartrate (LOPRESSOR) 50 mg tablet TAKE 1 TABLET(50MG TOTAL) BY MOUTH TWICE DAILY, Normal      ONE TOUCH ULTRA TEST test strip Use 4 times daily ( please dispense One touch Ultra test strips), Normal      pantoprazole (PROTONIX) 40 mg tablet Take 40 mg by mouth daily, Historical Med      pravastatin (PRAVACHOL) 80 mg tablet TAKE 1 TABLET BY MOUTH DAILY, Normal      predniSONE 5 mg tablet Take 1 tablet by mouth daily, Starting Wed 1/4/2012, Historical Med      rOPINIRole (REQUIP) 0 25 mg tablet Take 1 tablet by mouth 2 (two) times a day, Starting Tue 7/21/2015, Historical Med      sertraline (ZOLOFT) 100 mg tablet Take 2 tablets (200 mg total) by mouth daily for 90 days, Starting Mon 5/7/2018, Until Sun 8/5/2018, Normal      sodium bicarbonate 650 mg tablet Take 2 tablets by mouth 2 (two) times a day  , Starting Tue 1/23/2018, Historical Med      tacrolimus (PROGRAF) 1 mg capsule Take 3 mg by mouth daily 3 mg BID , Historical Med           No discharge procedures on file  ED Provider  Attending physically available and evaluated Richelle Shirley I managed the patient along with the ED Attending      Electronically Signed by         Elmer Rainey DO  07/24/18 8432

## 2018-07-25 ENCOUNTER — TELEPHONE (OUTPATIENT)
Dept: ENDOCRINOLOGY | Facility: CLINIC | Age: 53
End: 2018-07-25

## 2018-07-25 ENCOUNTER — HOSPITAL ENCOUNTER (OUTPATIENT)
Dept: NON INVASIVE DIAGNOSTICS | Facility: CLINIC | Age: 53
Discharge: HOME | End: 2018-07-25
Payer: MEDICARE

## 2018-07-25 DIAGNOSIS — E11.65 UNCONTROLLED TYPE 2 DIABETES MELLITUS WITH COMPLICATION, UNSPECIFIED WHETHER LONG TERM INSULIN USE: ICD-10-CM

## 2018-07-25 DIAGNOSIS — E11.8 UNCONTROLLED TYPE 2 DIABETES MELLITUS WITH COMPLICATION, UNSPECIFIED WHETHER LONG TERM INSULIN USE: ICD-10-CM

## 2018-07-25 DIAGNOSIS — I35.1 NONRHEUMATIC AORTIC (VALVE) INSUFFICIENCY: ICD-10-CM

## 2018-07-25 PROCEDURE — 93306 TTE W/DOPPLER COMPLETE: CPT | Performed by: INTERNAL MEDICINE

## 2018-07-25 PROCEDURE — 93306 TTE W/DOPPLER COMPLETE: CPT

## 2018-07-25 NOTE — TELEPHONE ENCOUNTER
Log dated 7/25/2018  scanned and attached to this encounter  Current diabetic medications patient is taking: Toujeo 15 units @   Humalog 5 units TID    Please review and recommend

## 2018-07-25 NOTE — TELEPHONE ENCOUNTER
Blood sugars, fasting tightly controlled     Current diabetic medications patient is taking:     Toujeo 15 units @ HS  Humalog 5 units TID    Please inform pt to reduce toujeo to 12 units at [facility]     Ricky Owens MD

## 2018-07-25 NOTE — TELEPHONE ENCOUNTER
Spoke to patient; the medications written on the bottom of her log are not correct  She is currently taking: Toujeo 7 units  Novolog 2 units plus sliding scale TID    Mailed new logs to patient

## 2018-07-26 ENCOUNTER — LAB (OUTPATIENT)
Dept: LAB | Age: 53
End: 2018-07-26
Payer: MEDICARE

## 2018-07-26 ENCOUNTER — TELEPHONE (OUTPATIENT)
Dept: NEPHROLOGY | Facility: CLINIC | Age: 53
End: 2018-07-26

## 2018-07-26 DIAGNOSIS — Z94.0 HISTORY OF KIDNEY TRANSPLANT: ICD-10-CM

## 2018-07-26 LAB
ANION GAP SERPL CALCULATED.3IONS-SCNC: 6 MMOL/L (ref 4–13)
BUN SERPL-MCNC: 37 MG/DL (ref 5–25)
CALCIUM SERPL-MCNC: 9 MG/DL (ref 8.3–10.1)
CHLORIDE SERPL-SCNC: 108 MMOL/L (ref 100–108)
CO2 SERPL-SCNC: 24 MMOL/L (ref 21–32)
CREAT SERPL-MCNC: 2.11 MG/DL (ref 0.6–1.3)
CREAT UR-MCNC: 46.5 MG/DL
ERYTHROCYTE [DISTWIDTH] IN BLOOD BY AUTOMATED COUNT: 22.1 % (ref 11.6–15.1)
GFR SERPL CREATININE-BSD FRML MDRD: 26 ML/MIN/1.73SQ M
GLUCOSE P FAST SERPL-MCNC: 67 MG/DL (ref 65–99)
HCT VFR BLD AUTO: 34.9 % (ref 34.8–46.1)
HGB BLD-MCNC: 10.3 G/DL (ref 11.5–15.4)
MCH RBC QN AUTO: 27.5 PG (ref 26.8–34.3)
MCHC RBC AUTO-ENTMCNC: 29.5 G/DL (ref 31.4–37.4)
MCV RBC AUTO: 93 FL (ref 82–98)
PLATELET # BLD AUTO: 204 THOUSANDS/UL (ref 149–390)
PMV BLD AUTO: 12 FL (ref 8.9–12.7)
POTASSIUM SERPL-SCNC: 4.2 MMOL/L (ref 3.5–5.3)
PROT UR-MCNC: 82 MG/DL
PROT/CREAT UR: 1.76 MG/G{CREAT} (ref 0–0.1)
RBC # BLD AUTO: 3.75 MILLION/UL (ref 3.81–5.12)
SODIUM SERPL-SCNC: 138 MMOL/L (ref 136–145)
WBC # BLD AUTO: 6.94 THOUSAND/UL (ref 4.31–10.16)

## 2018-07-26 PROCEDURE — 36415 COLL VENOUS BLD VENIPUNCTURE: CPT

## 2018-07-26 PROCEDURE — 82570 ASSAY OF URINE CREATININE: CPT

## 2018-07-26 PROCEDURE — 85027 COMPLETE CBC AUTOMATED: CPT

## 2018-07-26 PROCEDURE — 84156 ASSAY OF PROTEIN URINE: CPT

## 2018-07-26 PROCEDURE — 80048 BASIC METABOLIC PNL TOTAL CA: CPT

## 2018-07-26 NOTE — TELEPHONE ENCOUNTER
Toujeo 7 units  Novolog 2 units plus sliding scale TID    Blood sugars reviewed  Fasting blood sugar low  Reduce toujeo to 5 units   Continue novolog     Diana Chance MD

## 2018-07-26 NOTE — TELEPHONE ENCOUNTER
Spoke with [address] [place] [address]  They have not seen the patient in years  I will review the case with the transplant nephrologist at [address] [place] [address] to see if they need to see the patient 1st or can we manage the renal artery stenosis locally  I have also reached out to our transplant center [facility] to see if they have an interventionalist to complete slow contrast studies or not?

## 2018-07-27 ENCOUNTER — TELEPHONE (OUTPATIENT)
Dept: NEPHROLOGY | Facility: CLINIC | Age: 53
End: 2018-07-27

## 2018-07-27 ENCOUNTER — OFFICE VISIT (OUTPATIENT)
Dept: FAMILY MEDICINE CLINIC | Facility: CLINIC | Age: 53
End: 2018-07-27
Payer: MEDICARE

## 2018-07-27 VITALS
TEMPERATURE: 98 F | HEART RATE: 60 BPM | RESPIRATION RATE: 12 BRPM | WEIGHT: 228.2 LBS | DIASTOLIC BLOOD PRESSURE: 50 MMHG | BODY MASS INDEX: 36.28 KG/M2 | SYSTOLIC BLOOD PRESSURE: 162 MMHG

## 2018-07-27 DIAGNOSIS — B02.9 HERPES ZOSTER WITHOUT COMPLICATION: Primary | ICD-10-CM

## 2018-07-27 DIAGNOSIS — B02.9 HERPES ZOSTER WITHOUT COMPLICATION: ICD-10-CM

## 2018-07-27 DIAGNOSIS — L03.213 PERIORBITAL CELLULITIS OF LEFT EYE: Primary | ICD-10-CM

## 2018-07-27 DIAGNOSIS — Z94.0 RENAL TRANSPLANT RECIPIENT: ICD-10-CM

## 2018-07-27 DIAGNOSIS — N18.4 CHRONIC KIDNEY DISEASE, STAGE 4 (SEVERE) (HCC): ICD-10-CM

## 2018-07-27 PROCEDURE — 99214 OFFICE O/P EST MOD 30 MIN: CPT | Performed by: FAMILY MEDICINE

## 2018-07-27 RX ORDER — FAMCICLOVIR 500 MG/1
TABLET, FILM COATED ORAL
Qty: 21 TABLET | Refills: 0 | Status: SHIPPED | OUTPATIENT
Start: 2018-07-27 | End: 2018-07-27 | Stop reason: CLARIF

## 2018-07-27 RX ORDER — VALACYCLOVIR HYDROCHLORIDE 1 G/1
1000 TABLET, FILM COATED ORAL 2 TIMES DAILY
Qty: 14 TABLET | Refills: 0 | Status: SHIPPED | OUTPATIENT
Start: 2018-07-27 | End: 2018-10-12

## 2018-07-27 NOTE — TELEPHONE ENCOUNTER
Called from PCP  Pt with orbital cellulitis being treated with bactrim renal dosed  (could also explain Cr slightly higher)    Also there is concern for non disseminated zoster    - if there is any concern for zoster opthalmicus or patient is having disseminated disease then pt needs to be in hosp for IV acyclovir  - given that this is not concern for now, can dose valtrex 1 gm BID for renal adjusted dose    Reviewed with PCP

## 2018-07-27 NOTE — ASSESSMENT & PLAN NOTE
Hypertension - blood pressure is stable  Continue current medical regimen  Follow- up with cardiologist Dr Mike Mendez next week  Recommend that she stay home tomorrow. Can RTW Thursday if she feels okay.   Note in my outbox

## 2018-07-27 NOTE — ASSESSMENT & PLAN NOTE
Will start patient on antiviral medication Famvir  Will consult with patient's nephrologist Dr Carolee Shafer regarding the dose due to CKD stage 4

## 2018-07-27 NOTE — ASSESSMENT & PLAN NOTE
Recommended to stay well hydrated, avoid NSAIDs  Follow -up with nephrologist Dr Chandrakant Penn next week

## 2018-07-27 NOTE — PROGRESS NOTES
Chief Complaint   Patient presents with    Follow-up     ER follow up from 07/23/18 for Periorbital cellulitis of left eye  Assessment/Plan:    Periorbital cellulitis of left eye  Symptoms improved  Recommended to complete antibiotic therapy with Keflex 500 mg 4 times daily and Bactrim DS 1 tablet daily as prescribed for 10 days  Herpes zoster without complication  Will start patient on antiviral medication Famvir  Will consult with patient's nephrologist Dr Rhianna Cortez regarding the dose due to CKD stage 4  Chronic kidney disease, stage 4 (severe) (HCC)  Recommended to stay well hydrated, avoid NSAIDs  Follow -up with nephrologist Dr Rhianna Cortez next week  Essential hypertension  Hypertension - blood pressure is stable  Continue current medical regimen  Follow- up with cardiologist Dr Cal Borrero next week  Diagnoses and all orders for this visit:    Periorbital cellulitis of left eye    Herpes zoster without complication  -     famciclovir (FAMVIR) 500 mg tablet; Take 1 tab  3 times daily with food    Renal transplant recipient    Chronic kidney disease, stage 4 (severe) (HCC)          Subjective:      Patient ID: Rigoberto Montanez is a 46 y o  female  HPI     Patient presents for ER follow-up visit  She was seen at [facility] on July 23, 2018 for periorbital cellulitis of left eye  Patient was started on Bactrim 1 tablet daily and   Keflex 500 mg 1 capsule 4 times daily for 10 days  Patient states that swelling, pain over left side of her face and around the L eye improved  She has some crusty lesions on left temple and at the corner of left eye  Patient denies prior history of shingles  Patient has HTN, Type 1 DM, CKD stage 4, H/o renal and pancreatic transplant, indolent multiple myeloma  No changes in regular medications since last visit  Patient denies fever, chills  No headache, dizziness       Patient had done blood test in ER   Hemoglobin 10 7,  creatinine 1 76, potassium 3 9,   WBC 7 21 thousands  CT of the facial bones showed mild left periorbital/ infraorbital soft tissue swelling  No post septal inflammatory stranding to suggest orbital cellulitis  The following portions of the patient's history were reviewed and updated as appropriate: allergies, current medications, past family history, past medical history, past social history, past surgical history and problem list     Review of Systems   Constitutional: Positive for fatigue  Negative for activity change, chills and fever  HENT: Positive for facial swelling (improved )  Negative for congestion, ear pain, hearing loss, nosebleeds, sore throat, tinnitus and trouble swallowing  Eyes: Negative for pain, discharge, redness, itching and visual disturbance  Respiratory: Negative for cough, chest tightness, shortness of breath and wheezing  Cardiovascular: Negative for chest pain, palpitations and leg swelling  Gastrointestinal: Negative  Genitourinary: Negative for difficulty urinating, enuresis, frequency and hematuria  Musculoskeletal: Positive for arthralgias and gait problem (ambulates with a cane)  Skin: Negative for rash and wound  Crusty lesions at the corner L eye, L temple   Neurological: Negative for dizziness, syncope and headaches  Hematological: Negative for adenopathy  Bruises/bleeds easily  Objective:      /50 (BP Location: Left arm, Patient Position: Sitting, Cuff Size: Large)   Pulse 60   Temp 98 °F (36 7 °C) (Tympanic)   Resp 12   Wt 104 kg (228 lb 3 2 oz)   LMP  (LMP Unknown)   BMI 36 28 kg/m²          Physical Exam   Constitutional:   Obese   HENT:   Head: Normocephalic and atraumatic  Right Ear: External ear normal    Left Ear: External ear normal    Mouth/Throat: Oropharynx is clear and moist    Eyes: Conjunctivae are normal  Pupils are equal, round, and reactive to light  Neck: Normal range of motion  Neck supple  Cardiovascular: Normal rate and regular rhythm  No murmur heard  No BL LE edema   Pulmonary/Chest: Effort normal and breath sounds normal  She has no wheezes  She has no rales  Abdominal: Soft  Bowel sounds are normal  There is no tenderness  Musculoskeletal:   Patient ambulates with a cane   Lymphadenopathy:     She has no cervical adenopathy  Skin: Skin is warm and dry  Skin redness on L side face improved  Mild swelling under L eye  Crusty skin lesions at the corner L eye and L temple  Nursing note and vitals reviewed

## 2018-07-27 NOTE — ASSESSMENT & PLAN NOTE
Symptoms improved  Recommended to complete antibiotic therapy with Keflex 500 mg 4 times daily and Bactrim DS 1 tablet daily as prescribed for 10 days

## 2018-07-30 ENCOUNTER — OFFICE VISIT (OUTPATIENT)
Dept: NEPHROLOGY | Facility: CLINIC | Age: 53
End: 2018-07-30
Payer: MEDICARE

## 2018-07-30 VITALS
BODY MASS INDEX: 35.01 KG/M2 | WEIGHT: 231 LBS | SYSTOLIC BLOOD PRESSURE: 150 MMHG | DIASTOLIC BLOOD PRESSURE: 68 MMHG | HEIGHT: 68 IN

## 2018-07-30 DIAGNOSIS — Z94.0 RENAL TRANSPLANT RECIPIENT: Primary | ICD-10-CM

## 2018-07-30 PROCEDURE — 99215 OFFICE O/P EST HI 40 MIN: CPT | Performed by: INTERNAL MEDICINE

## 2018-07-30 NOTE — PATIENT INSTRUCTIONS
1) you canNOT get the zostavax vaccine for shingles; but you CAN get the Shingrix (zoster Vaccine recombinant adjuvant)  2) please do your labwork next week  3) please talk to your Psychiatrist about your medications for your mental health  Sertraline can sometimes interact with tacrolimus  Your tacrolimus levels are stable, but we should discuss these issues with your Psychiatry team  4) Please see your Cardiologist as you have planned tomorrow  5) continue your medications as you are doing  6) if the lesions around your eye come back or approach your eye lid or nose, you need to call your eye doctor right away and/or go to the ER if you are not able to get a hold of your eye doctor

## 2018-07-30 NOTE — LETTER
August 1, 2018     Cooper Elizabeth MD  [address] [facility] [address] [facility] [address]    Patient: Chanelle Galdamez   YOB: 1965   Date of Visit: 7/30/2018       Dear Dr Cinda Ortiz: Thank you for referring Chanelle Galdamez to me for evaluation  Below are my notes for this consultation  If you have questions, please do not hesitate to call me  I look forward to following your patient along with you  Sincerely,        Carlos Spivey MD        CC: No Recipients  Carlos Spivey MD  7/30/2018 10:09 AM  Addendum  NEPHROLOGY OFFICE VISIT   Chanelle Galdamez 46 y o  female MRN: [id number]  7/30/2018    Reason for Visit: renal transplant    ASSESSMENT and PLAN:    I had the pleasure of seeing Ms Brian Cox today in the renal clinic for the continued management of renal transplant  Since our last visit, there has been no ER visits or hospitalizations  He currently has no complaints at this time and is feeling well  Patient denies any chest pain, shortness of breath and swelling  The last blood work was done on july, which we have reviewed together  48 yo female with PMHx of renal pancreas transplant 1998, DM I, failed pancreas transplant 2017, HTN, recurrent UTI/pyelo, recurrent, 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, Did require dialysis in 2014, orthostasis, anemia, hypothyroid, 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, dCHF, aortic regurg, dCHF grade I, zoster in July, subclavian art stenosis on Left presents for follow-up visit for renal transplant     Patient has had multiple admissions and issues recently    9/2017 -acute kidney injury, urinary frequency  Treated initially for UTI      10/2017 -urinary incontinence  Was started on antibiotics for possible UTI  Was started on Bactrim prophylaxis      11/2017 -abdominal pain  Nausea  Confusion  Depression and anxiety  Patient had EGD which showed gastritis  Given volume expansion     2/2018-depression and anxiety  Treated for UTI also  07/2018-treated for urinary tract infection; also was seen in the ER on 07/23 for periorbital cellulitis     1) Renal transplant - Baseline Cr 1 6 - 1 9 mg/dL  recurrent UTI/pyelo, recurrent episodes of EDUARDO due to volume depletion  BK negative  Has history of renal artery stenosis     - Cr higher than baseline this visit likely due to Bactrim  -recheck blood work next week  - follow renal function closely  - advised to drink 2 L fluids daily  - has renal artery stenosis > 60% -I have reached out to [address] and awaiting to hear back  Also reached out to our team at [facility] and awaiting to hear back  Regarding options for low contrast renal artery studies       2) Immunosuppression - given Hematological issues, goal tac lower end 4-5     - tac was reduced to 3 mg BID-goal tacrolimus level should be closer to 4   - Continue prednisone 5 mg  - continue labwork check and current labwork     3) HTN - history of orthostasis and low diast BP  widened pulse pressure possible due to aortic valve regurg? needs afterload reduction     -today blood pressures are 150s in the office   - amlodipine 10 mg in AM and 5 mg in PM, clonidine 0 3 TID, doxazosin 3 mg nightly, metoprolol 50 BID, hydralazine 50 TID  - increased doxazosin to 3 mg nightly over the phone  -renal artery study with stenosis present     4) Anemia -      - prior fec occ positive  - had EGD prior admission with gastritis  - iron sat low -  - iron infusions started in June 2018  - prior to [facility] or [facility], will need to review with Hematology given history of paraproteinemia as above   -patient is the GI team in July-to have colonoscopy and EGD     5) recurrent UTI/pyelo - as above; completed monosat       6) Vaccine - should stay up to date on innactivated vaccinations    -when safe, okay to take shingrex vaccine - new inactivated shingles vaccine     7) Lipids - as per PCP with regards to lipid check     8) BMD - DEXA due 2017        - monitor for now  -follow PTH and phosphorus     9) proteinuria -likely multifactorial     8) age appropriate ca screening - needs to remain up to date with mammogram an colonoscopy (next c scope is in 8/2018)     - needs to f/u with Derm Yearly - pt will call to make appt  - GI referral as patient is due this year for c scope and jony given prior positive fec occ-patient saw the GI team     11) depression - Patient states depression is well-controlled with the current regimen      - sertraline can interact with tac  Tac level stable  - pt is on 4-5 different anti psych/anti depressants  Pt will discuss with Psychiatry team      12) question of renal art stenosis - We'll continue to monitor for now  If blood pressures worsen her renal function worsens rapidly may need intervention     - have not placed on ACE or ARB   -see above     13) MGUS/IgG Kappa - follows with Hematology/Oncology     -patient may require chemotherapy in the near future     14) Fatigue - prior TSH nl  Unclear etiology  EBV IgM neg, BK PCR also  improved     15) hyponatremia - improved       16) pancreas transplant -      - failed pancreas allograft - following with Endocrine  - last A1c 4 8     17) Volume - relatively euvolemic, but has gained weight and trace edema LE     - cont furosemide    18) cellulitis periorbital    -improving with antibacterial treatment   -also is on Valtrex-patient advised to go to the ER call Ophthalmology if lesions come back or worsen   -today appear to be resolving  -spoke with the ophthalmologist over the phone today and reviewed the case    19) aortic regurg - on ECHO July with grade I dCHF    20) subclavian art stenosis on L       It was a pleasure of evaluating Ms Marquita Grossman in the renal office  Please do not hesitate to contact our team if further issues or questions arise in the interim          No problem-specific Assessment & Plan notes found for this encounter  HPI:    Patient with recent periorbital cellulitis  Improving  Blood pressure is relatively stable today though elevated  No nausea, no vomiting  PATIENT INSTRUCTIONS:    Patient Instructions   1) you canNOT get the zostavax vaccine for shingles; but you CAN get the Shingrix (zoster Vaccine recombinant adjuvant)  2) please do your labwork next week  3) please talk to your Psychiatrist about your medications for your mental health  Sertraline can sometimes interact with tacrolimus  Your tacrolimus levels are stable, but we should discuss these issues with your Psychiatry team  4) Please see your Cardiologist as you have planned tomorrow  5) continue your medications as you are doing  6) if the lesions around your eye come back or approach your eye lid or nose, you need to call your eye doctor right away and/or go to the ER if you are not able to get a hold of your eye doctor  OBJECTIVE:  Current Weight: Weight - Scale: 105 kg (231 lb)  Vitals:    07/30/18 0847   BP: 150/68   BP Location: Left arm   Patient Position: Sitting   Cuff Size: Standard   Weight: 105 kg (231 lb)   Height: 5' 8" (1 727 m)    Body mass index is 35 12 kg/m²  REVIEW OF SYSTEMS:    Review of Systems   Constitutional: Positive for fatigue  HENT: Negative  Cellulitis of eye, improving   Eyes: Negative  Respiratory: Negative  Negative for shortness of breath  Cardiovascular: Negative  Negative for leg swelling  Gastrointestinal: Negative  Endocrine: Negative  Genitourinary: Negative  Negative for difficulty urinating  Musculoskeletal: Negative  Skin: Negative  Allergic/Immunologic: Negative  Neurological: Negative  Hematological: Negative  Psychiatric/Behavioral: Negative  All other systems reviewed and are negative  PHYSICAL EXAM:      Physical Exam   Constitutional: She is oriented to person, place, and time  She appears well-developed and well-nourished  No distress  HENT:   Head: Normocephalic and atraumatic  Mouth/Throat: No oropharyngeal exudate  Eyes: Conjunctivae are normal  Right eye exhibits no discharge  Left eye exhibits no discharge  No scleral icterus  Cellulitis lesions are not appreciated today  There wall crusted lesions on the [place] of the left head  No pustules  Neck: Normal range of motion  Neck supple  No JVD present  Cardiovascular: Normal rate and regular rhythm  Exam reveals no gallop and no friction rub  No murmur heard  Pulmonary/Chest: Effort normal and breath sounds normal  No respiratory distress  She has no wheezes  She has no rales  Chest wall lesions are crusted  Abdominal: Soft  Bowel sounds are normal  She exhibits no distension  There is no tenderness  There is no rebound  Musculoskeletal: Normal range of motion  She exhibits no edema, tenderness or deformity  Neurological: She is alert and oriented to person, place, and time  Coordination normal    Skin: Skin is warm and dry  Rash noted  She is not diaphoretic  No erythema  No pallor  Psychiatric: She has a normal mood and affect  Her behavior is normal  Judgment and thought content normal    Nursing note and vitals reviewed        Medications:    Current Outpatient Prescriptions:     amLODIPine (NORVASC) 10 mg tablet, 10mg in the morning & 5mg in the evening, Disp: 45 tablet, Rfl: 3    ARIPiprazole (ABILIFY) 20 MG tablet, Take 1 tablet by mouth, Disp: , Rfl:     aspirin 81 MG tablet, Take 1 tablet by mouth daily, Disp: , Rfl:     BD PEN NEEDLE VISHNU U/F 32G X 4 MM MISC, USE FOUR TIMES DAILY, Disp: 90 each, Rfl: 0    busPIRone (BUSPAR) 5 mg tablet, Take 1 tablet (5 mg total) by mouth 2 (two) times a day for 90 days, Disp: 180 tablet, Rfl: 0    cephalexin (KEFLEX) 250 mg capsule, Take 2 capsules (500 mg total) by mouth 4 (four) times a day for 10 days, Disp: 80 capsule, Rfl: 0    Cholecalciferol (VITAMIN D3) 1000 units CAPS, Take by mouth, Disp: , Rfl:     cloNIDine (CATAPRES) 0 1 mg tablet, Take 0 3 mg by mouth every 8 (eight) hours  , Disp: , Rfl:     doxazosin (CARDURA) 1 mg tablet, Take 2 tablets (2 mg total) by mouth daily at bedtime (Patient taking differently: Take 3 mg by mouth daily at bedtime  ), Disp: 180 tablet, Rfl: 0    DULoxetine (CYMBALTA) 60 mg delayed release capsule, TAKE ONE CAPSULE BY MOUTH TWICE DAILY, Disp: 180 capsule, Rfl: 0    folic acid (FOLVITE) 1 mg tablet, Take 1 tablet by mouth daily, Disp: , Rfl:     furosemide (LASIX) 20 mg tablet, Take 1 tablet (20 mg total) by mouth daily, Disp: 90 tablet, Rfl: 4    HUMALOG KWIKPEN 100 units/mL injection pen, INJCT INSULIN PER SLIDING SCALE, IF BLOOD SUGAR 150-200=2UNITS, 201-250=4UNITS, 251-300=6UNITS, 301-350=8UNITS>350=10UNITS, Disp: 45 mL, Rfl: 2    hydrALAZINE (APRESOLINE) 50 mg tablet, TAKE 1 TABLET BY MOUTH THREE TIMES DAILY, Disp: 270 tablet, Rfl: 3    Insulin Glargine (TOUJEO SOLOSTAR) 300 units/mL CONCETRATED U-300 injection pen, Inject 5 Units under the skin daily at bedtime, Disp: 4 pen, Rfl: 0    Lancets (ONETOUCH ULTRASOFT) lancets, by Does not apply route 4 (four) times a day  , Disp: , Rfl:     levothyroxine 50 mcg tablet, TAKE 1 TABLET BY MOUTH ONLY ON SUNDAYS, TAKE WITH 112 MCG DOSE, Disp: 12 tablet, Rfl: 0    LORazepam (ATIVAN) 2 mg tablet, Take 1 tablet (2 mg total) by mouth 3 (three) times a day as needed for anxiety for up to 180 days, Disp: 270 tablet, Rfl: 1    metoprolol tartrate (LOPRESSOR) 50 mg tablet, TAKE 1 TABLET(50MG TOTAL) BY MOUTH TWICE DAILY, Disp: 180 tablet, Rfl: 5    ONE TOUCH ULTRA TEST test strip, Use 4 times daily ( please dispense One touch Ultra test strips), Disp: 400 each, Rfl: 1    pantoprazole (PROTONIX) 40 mg tablet, Take 40 mg by mouth daily, Disp: , Rfl:     pravastatin (PRAVACHOL) 80 mg tablet, TAKE 1 TABLET BY MOUTH DAILY, Disp: 90 tablet, Rfl: 5    predniSONE 5 mg tablet, Take 1 tablet by mouth daily, Disp: , Rfl:    rOPINIRole (REQUIP) 0 25 mg tablet, Take 1 tablet by mouth 2 (two) times a day, Disp: , Rfl:     sertraline (ZOLOFT) 100 mg tablet, Take 2 tablets (200 mg total) by mouth daily for 90 days, Disp: 180 tablet, Rfl: 0    sodium bicarbonate 650 mg tablet, Take 2 tablets by mouth 2 (two) times a day  , Disp: , Rfl:     sulfamethoxazole-trimethoprim (BACTRIM DS) 800-160 mg per tablet, Take 1 tablet by mouth daily for 10 days smx-tmp DS (BACTRIM) 800-160 mg tabs (1tab q12 D10), Disp: 10 tablet, Rfl: 0    tacrolimus (PROGRAF) 1 mg capsule, Take 3 mg by mouth daily 3 mg BID , Disp: , Rfl:     valACYclovir (VALTREX) 1,000 mg tablet, Take 1 tablet (1,000 mg total) by mouth 2 (two) times a day for 7 days, Disp: 14 tablet, Rfl: 0    Laboratory Results:    Results from last 7 days  Lab Units 07/26/18  0634 07/23/18  1324   WBC Thousand/uL 6 94 7 21   HEMOGLOBIN g/dL 10 3* 10 7*   HEMATOCRIT % 34 9 35 5   PLATELETS Thousands/uL 204 191   SODIUM mmol/L 138 139   POTASSIUM mmol/L 4 2 3 9   CHLORIDE mmol/L 108 106   CO2 mmol/L 24 25   BUN mg/dL 37* 35*   CREATININE mg/dL 2 11* 1 76*   CALCIUM mg/dL 9 0 9 6   GLUCOSE RANDOM mg/dL  --  105       Results for orders placed or performed in visit on 07/26/18   Protein / creatinine ratio, urine   Result Value Ref Range    Creatinine, Ur 46 5 mg/dL    Protein Urine Random 82 mg/dL    Prot/Creat Ratio, Ur 1 76 (H) 0 00 - 7 99   Basic metabolic panel   Result Value Ref Range    Sodium 138 136 - 145 mmol/L    Potassium 4 2 3 5 - 5 3 mmol/L    Chloride 108 100 - 108 mmol/L    CO2 24 21 - 32 mmol/L    Anion Gap 6 4 - 13 mmol/L    BUN 37 (H) 5 - 25 mg/dL    Creatinine 2 11 (H) 0 60 - 1 30 mg/dL    Glucose, Fasting 67 65 - 99 mg/dL    Calcium 9 0 8 3 - 10 1 mg/dL    eGFR 26 ml/min/1 73sq m   CBC   Result Value Ref Range    WBC 6 94 4 31 - 10 16 Thousand/uL    RBC 3 75 (L) 3 81 - 5 12 Million/uL    Hemoglobin 10 3 (L) 11 5 - 15 4 g/dL    Hematocrit 34 9 34 8 - 46 1 %    MCV 93 82 - 98 fL    MCH 27 5 26 8 - 34 3 pg    MCHC 29 5 (L) 31 4 - 37 4 g/dL    RDW 22 1 (H) 11 6 - 15 1 %    Platelets 274 040 - 105 Thousands/uL    MPV 12 0 8 9 - 12 7 fL

## 2018-07-30 NOTE — PROGRESS NOTES
NEPHROLOGY OFFICE VISIT   Seabron Cranker 46 y o  female MRN: [id number]  7/30/2018    Reason for Visit: renal transplant    ASSESSMENT and PLAN:    I had the pleasure of seeing Ms Oralia Grijalva today in the renal clinic for the continued management of renal transplant  Since our last visit, there has been no ER visits or hospitalizations  He currently has no complaints at this time and is feeling well  Patient denies any chest pain, shortness of breath and swelling  The last blood work was done on july, which we have reviewed together  47 yo female with PMHx of renal pancreas transplant 1998, DM I, failed pancreas transplant 2017, HTN, recurrent UTI/pyelo, recurrent, 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, Did require dialysis in 2014, orthostasis, anemia, hypothyroid, 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, dCHF, aortic regurg, dCHF grade I, zoster in July, subclavian art stenosis on Left presents for follow-up visit for renal transplant     Patient has had multiple admissions and issues recently    9/2017 -acute kidney injury, urinary frequency  Treated initially for UTI      10/2017 -urinary incontinence  Was started on antibiotics for possible UTI  Was started on Bactrim prophylaxis      11/2017 -abdominal pain  Nausea  Confusion  Depression and anxiety  Patient had EGD which showed gastritis  Given volume expansion        2/2018-depression and anxiety  Treated for UTI also  07/2018-treated for urinary tract infection; also was seen in the ER on 07/23 for periorbital cellulitis     1) Renal transplant - Baseline Cr 1 6 - 1 9 mg/dL  recurrent UTI/pyelo, recurrent episodes of EDUARDO due to volume depletion  BK negative  Has history of renal artery stenosis     - Cr higher than baseline this visit likely due to Bactrim  -recheck blood work next week  - follow renal function closely     - advised to drink 2 L fluids daily  - has renal artery stenosis > 60%-I have reached out to [address] and awaiting to hear back  Also reached out to our team at [facility] and awaiting to hear back  Regarding options for low contrast renal artery studies  - UPDATE - heard back from [facility]  Alphonse will reestablish care with patient and assist with renal art study evaluations        2) Immunosuppression - given Hematological issues, goal tac lower end 4-5     - tac was reduced to 3 mg BID-goal tacrolimus level should be closer to 4   - Continue prednisone 5 mg  - continue labwork check and current labwork     3) HTN - history of orthostasis and low diast BP  widened pulse pressure possible due to aortic valve regurg? needs afterload reduction     -today blood pressures are 150s in the office   - amlodipine 10 mg in AM and 5 mg in PM, clonidine 0 3 TID, doxazosin 3 mg nightly, metoprolol 50 BID, hydralazine 50 TID  - increased doxazosin to 3 mg nightly over the phone  -renal artery study with stenosis present     4) Anemia -      - prior fec occ positive  - had EGD prior admission with gastritis  - iron sat low -  - iron infusions started in June 2018  - prior to [facility] or [facility], will need to review with Hematology given history of paraproteinemia as above   -patient is the GI team in July-to have colonoscopy and EGD     5) recurrent UTI/pyelo - as above; completed monosat       6) Vaccine - should stay up to date on innactivated vaccinations    -when safe, okay to take shingrex vaccine - new inactivated shingles vaccine     7) Lipids - as per PCP with regards to lipid check     8) BMD - DEXA due 2017        - monitor for now  -follow PTH and phosphorus     9) proteinuria -likely multifactorial     8) age appropriate ca screening - needs to remain up to date with mammogram an colonoscopy (next c scope is in 8/2018)     - needs to f/u with Derm Yearly - pt will call to make appt  - GI referral as patient is due this year for c scope and jony given prior positive fec occ-patient saw the GI team     11) depression - Patient states depression is well-controlled with the current regimen      - sertraline can interact with tac  Tac level stable  - pt is on 4-5 different anti psych/anti depressants  Pt will discuss with Psychiatry team      12) question of renal art stenosis - We'll continue to monitor for now  If blood pressures worsen her renal function worsens rapidly may need intervention     - have not placed on ACE or ARB   -see above     13) MGUS/IgG Kappa - follows with Hematology/Oncology     -patient may require chemotherapy in the near future     14) Fatigue - prior TSH nl  Unclear etiology  EBV IgM neg, BK PCR also  improved     15) hyponatremia - improved       16) pancreas transplant -      - failed pancreas allograft - following with Endocrine  - last A1c 4 8     17) Volume - relatively euvolemic, but has gained weight and trace edema LE     - cont furosemide    18) cellulitis periorbital    -improving with antibacterial treatment   -also is on Valtrex-patient advised to go to the ER call Ophthalmology if lesions come back or worsen   -today appear to be resolving  -spoke with the ophthalmologist over the phone today and reviewed the case    19) aortic regurg - on ECHO July with grade I dCHF    20) subclavian art stenosis on L       It was a pleasure of evaluating Ms  Earlville Fossa in the renal office  Please do not hesitate to contact our team if further issues or questions arise in the interim  No problem-specific Assessment & Plan notes found for this encounter  HPI:    Patient with recent periorbital cellulitis  Improving  Blood pressure is relatively stable today though elevated  No nausea, no vomiting      PATIENT INSTRUCTIONS:    Patient Instructions   1) you canNOT get the zostavax vaccine for shingles; but you CAN get the Shingrix (zoster Vaccine recombinant adjuvant)  2) please do your labwork next week  3) please talk to your Psychiatrist about your medications for your mental health  Sertraline can sometimes interact with tacrolimus  Your tacrolimus levels are stable, but we should discuss these issues with your Psychiatry team  4) Please see your Cardiologist as you have planned tomorrow  5) continue your medications as you are doing  6) if the lesions around your eye come back or approach your eye lid or nose, you need to call your eye doctor right away and/or go to the ER if you are not able to get a hold of your eye doctor  OBJECTIVE:  Current Weight: Weight - Scale: 105 kg (231 lb)  Vitals:    07/30/18 0847   BP: 150/68   BP Location: Left arm   Patient Position: Sitting   Cuff Size: Standard   Weight: 105 kg (231 lb)   Height: 5' 8" (1 727 m)    Body mass index is 35 12 kg/m²  REVIEW OF SYSTEMS:    Review of Systems   Constitutional: Positive for fatigue  HENT: Negative  Cellulitis of eye, improving   Eyes: Negative  Respiratory: Negative  Negative for shortness of breath  Cardiovascular: Negative  Negative for leg swelling  Gastrointestinal: Negative  Endocrine: Negative  Genitourinary: Negative  Negative for difficulty urinating  Musculoskeletal: Negative  Skin: Negative  Allergic/Immunologic: Negative  Neurological: Negative  Hematological: Negative  Psychiatric/Behavioral: Negative  All other systems reviewed and are negative  PHYSICAL EXAM:      Physical Exam   Constitutional: She is oriented to person, place, and time  She appears well-developed and well-nourished  No distress  HENT:   Head: Normocephalic and atraumatic  Mouth/Throat: No oropharyngeal exudate  Eyes: Conjunctivae are normal  Right eye exhibits no discharge  Left eye exhibits no discharge  No scleral icterus  Cellulitis lesions are not appreciated today  There wall crusted lesions on [facility] of the left head  No pustules  Neck: Normal range of motion  Neck supple  No JVD present     Cardiovascular: Normal rate and regular rhythm  Exam reveals no gallop and no friction rub  No murmur heard  Pulmonary/Chest: Effort normal and breath sounds normal  No respiratory distress  She has no wheezes  She has no rales  Chest wall lesions are crusted  Abdominal: Soft  Bowel sounds are normal  She exhibits no distension  There is no tenderness  There is no rebound  Musculoskeletal: Normal range of motion  She exhibits no edema, tenderness or deformity  Neurological: She is alert and oriented to person, place, and time  Coordination normal    Skin: Skin is warm and dry  Rash noted  She is not diaphoretic  No erythema  No pallor  Psychiatric: She has a normal mood and affect  Her behavior is normal  Judgment and thought content normal    Nursing note and vitals reviewed        Medications:    Current Outpatient Prescriptions:     amLODIPine (NORVASC) 10 mg tablet, 10mg in the morning & 5mg in the evening, Disp: 45 tablet, Rfl: 3    ARIPiprazole (ABILIFY) 20 MG tablet, Take 1 tablet by mouth, Disp: , Rfl:     aspirin 81 MG tablet, Take 1 tablet by mouth daily, Disp: , Rfl:     BD PEN NEEDLE VISHNU U/F 32G X 4 MM MISC, USE FOUR TIMES DAILY, Disp: 90 each, Rfl: 0    busPIRone (BUSPAR) 5 mg tablet, Take 1 tablet (5 mg total) by mouth 2 (two) times a day for 90 days, Disp: 180 tablet, Rfl: 0    cephalexin (KEFLEX) 250 mg capsule, Take 2 capsules (500 mg total) by mouth 4 (four) times a day for 10 days, Disp: 80 capsule, Rfl: 0    Cholecalciferol (VITAMIN D3) 1000 units CAPS, Take by mouth, Disp: , Rfl:     cloNIDine (CATAPRES) 0 1 mg tablet, Take 0 3 mg by mouth every 8 (eight) hours  , Disp: , Rfl:     doxazosin (CARDURA) 1 mg tablet, Take 2 tablets (2 mg total) by mouth daily at bedtime (Patient taking differently: Take 3 mg by mouth daily at bedtime  ), Disp: 180 tablet, Rfl: 0    DULoxetine (CYMBALTA) 60 mg delayed release capsule, TAKE ONE CAPSULE BY MOUTH TWICE DAILY, Disp: 180 capsule, Rfl: 0    folic acid (FOLVITE) 1 mg tablet, Take 1 tablet by mouth daily, Disp: , Rfl:     furosemide (LASIX) 20 mg tablet, Take 1 tablet (20 mg total) by mouth daily, Disp: 90 tablet, Rfl: 4    HUMALOG KWIKPEN 100 units/mL injection pen, INJCT INSULIN PER SLIDING SCALE, IF BLOOD SUGAR 150-200=2UNITS, 201-250=4UNITS, 251-300=6UNITS, 301-350=8UNITS>350=10UNITS, Disp: 45 mL, Rfl: 2    hydrALAZINE (APRESOLINE) 50 mg tablet, TAKE 1 TABLET BY MOUTH THREE TIMES DAILY, Disp: 270 tablet, Rfl: 3    Insulin Glargine (TOUJEO SOLOSTAR) 300 units/mL CONCETRATED U-300 injection pen, Inject 5 Units under the skin daily at bedtime, Disp: 4 pen, Rfl: 0    Lancets (ONETOUCH ULTRASOFT) lancets, by Does not apply route 4 (four) times a day  , Disp: , Rfl:     levothyroxine 50 mcg tablet, TAKE 1 TABLET BY MOUTH ONLY ON SUNDAYS, TAKE WITH 112 MCG DOSE, Disp: 12 tablet, Rfl: 0    LORazepam (ATIVAN) 2 mg tablet, Take 1 tablet (2 mg total) by mouth 3 (three) times a day as needed for anxiety for up to 180 days, Disp: 270 tablet, Rfl: 1    metoprolol tartrate (LOPRESSOR) 50 mg tablet, TAKE 1 TABLET(50MG TOTAL) BY MOUTH TWICE DAILY, Disp: 180 tablet, Rfl: 5    ONE TOUCH ULTRA TEST test strip, Use 4 times daily ( please dispense One touch Ultra test strips), Disp: 400 each, Rfl: 1    pantoprazole (PROTONIX) 40 mg tablet, Take 40 mg by mouth daily, Disp: , Rfl:     pravastatin (PRAVACHOL) 80 mg tablet, TAKE 1 TABLET BY MOUTH DAILY, Disp: 90 tablet, Rfl: 5    predniSONE 5 mg tablet, Take 1 tablet by mouth daily, Disp: , Rfl:     rOPINIRole (REQUIP) 0 25 mg tablet, Take 1 tablet by mouth 2 (two) times a day, Disp: , Rfl:     sertraline (ZOLOFT) 100 mg tablet, Take 2 tablets (200 mg total) by mouth daily for 90 days, Disp: 180 tablet, Rfl: 0    sodium bicarbonate 650 mg tablet, Take 2 tablets by mouth 2 (two) times a day  , Disp: , Rfl:     sulfamethoxazole-trimethoprim (BACTRIM DS) 800-160 mg per tablet, Take 1 tablet by mouth daily for 10 days smx-tmp DS (BACTRIM) 800-160 mg tabs (1tab q12 D10), Disp: 10 tablet, Rfl: 0    tacrolimus (PROGRAF) 1 mg capsule, Take 3 mg by mouth daily 3 mg BID , Disp: , Rfl:     valACYclovir (VALTREX) 1,000 mg tablet, Take 1 tablet (1,000 mg total) by mouth 2 (two) times a day for 7 days, Disp: 14 tablet, Rfl: 0    Laboratory Results:    Results from last 7 days  Lab Units 07/26/18  0634 07/23/18  1324   WBC Thousand/uL 6 94 7 21   HEMOGLOBIN g/dL 10 3* 10 7*   HEMATOCRIT % 34 9 35 5   PLATELETS Thousands/uL 204 191   SODIUM mmol/L 138 139   POTASSIUM mmol/L 4 2 3 9   CHLORIDE mmol/L 108 106   CO2 mmol/L 24 25   BUN mg/dL 37* 35*   CREATININE mg/dL 2 11* 1 76*   CALCIUM mg/dL 9 0 9 6   GLUCOSE RANDOM mg/dL  --  105       Results for orders placed or performed in visit on 07/26/18   Protein / creatinine ratio, urine   Result Value Ref Range    Creatinine, Ur 46 5 mg/dL    Protein Urine Random 82 mg/dL    Prot/Creat Ratio, Ur 1 76 (H) 0 00 - 0 01   Basic metabolic panel   Result Value Ref Range    Sodium 138 136 - 145 mmol/L    Potassium 4 2 3 5 - 5 3 mmol/L    Chloride 108 100 - 108 mmol/L    CO2 24 21 - 32 mmol/L    Anion Gap 6 4 - 13 mmol/L    BUN 37 (H) 5 - 25 mg/dL    Creatinine 2 11 (H) 0 60 - 1 30 mg/dL    Glucose, Fasting 67 65 - 99 mg/dL    Calcium 9 0 8 3 - 10 1 mg/dL    eGFR 26 ml/min/1 73sq m   CBC   Result Value Ref Range    WBC 6 94 4 31 - 10 16 Thousand/uL    RBC 3 75 (L) 3 81 - 5 12 Million/uL    Hemoglobin 10 3 (L) 11 5 - 15 4 g/dL    Hematocrit 34 9 34 8 - 46 1 %    MCV 93 82 - 98 fL    MCH 27 5 26 8 - 34 3 pg    MCHC 29 5 (L) 31 4 - 37 4 g/dL    RDW 22 1 (H) 11 6 - 15 1 %    Platelets 736 802 - 915 Thousands/uL    MPV 12 0 8 9 - 12 7 fL

## 2018-07-30 NOTE — LETTER
July 30, 2018     [address]    Patient: Loreto Atkinson   YOB: 1965   Date of Visit: 7/30/2018       Dear Dr Jian Denis:    Thank you for referring Loreto Atkinson to me for evaluation  Below are my notes for this consultation  If you have questions, please do not hesitate to call me  I look forward to following your patient along with you  Sincerely,        Alexys James MD        CC: MD Chika Spence MD Rolfe Pearl, MD Percy Bowens, MD  7/30/2018  9:42 AM  Sign at close encounter  [address] [facility] 46 y o  female MRN: [id number]  7/30/2018    Reason for Visit: renal transplant    ASSESSMENT and PLAN:    I had the pleasure of seeing Ms Aida Yuen today in the renal clinic for the continued management of renal transplant  Since our last visit, there has been no ER visits or hospitalizations  He currently has no complaints at this time and is feeling well  Patient denies any chest pain, shortness of breath and swelling  The last blood work was done on july, which we have reviewed together  48 yo female with PMHx of renal pancreas transplant 1998, DM I, failed pancreas transplant 2017, HTN, recurrent UTI/pyelo, recurrent, 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, Did require dialysis in 2014, orthostasis, anemia, hypothyroid, 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, dCHF, aortic regurg, dCHF grade I, zoster in July, presents for follow-up visit for renal transplant     Patient has had multiple admissions and issues recently    9/2017 -acute kidney injury, urinary frequency  Treated initially for UTI      10/2017 -urinary incontinence  Was started on antibiotics for possible UTI  Was started on Bactrim prophylaxis      11/2017 -abdominal pain  Nausea  Confusion  Depression and anxiety  Patient had EGD which showed gastritis    Given volume expansion        2/2018-depression and anxiety  Treated for UTI also  07/2018-treated for urinary tract infection; also was seen in the ER on 07/23 for periorbital cellulitis     1) Renal transplant - Baseline Cr 1 6 - 1 9 mg/dL  recurrent UTI/pyelo, recurrent episodes of EDUARDO due to volume depletion  BK negative  Has history of renal artery stenosis     - Cr higher than baseline this visit likely due to Bactrim  -recheck blood work next week  - follow renal function closely  - advised to drink 2 L fluids daily  - has renal artery stenosis > 60% -I have reached out to [address] and awaiting to hear back  Also reached out to our team at [facility] and awaiting to hear back  Regarding options for low contrast renal artery studies       2) Immunosuppression - given Hematological issues, goal tac lower end 4-5     - tac was reduced to 3 mg BID-goal tacrolimus level should be closer to 4   - Continue prednisone 5 mg  - continue labwork check and current labwork     3) HTN - history of orthostasis and low diast BP  widened pulse pressure possible due to aortic valve regurg? needs afterload reduction     -today blood pressures are 150s in the office   - amlodipine 10 mg in AM and 5 mg in PM, clonidine 0 3 TID, doxazosin 3 mg nightly, metoprolol 50 BID, hydralazine 50 TID  - increased doxazosin to 3 mg nightly over the phone  -renal artery study with stenosis present     4) Anemia -      - prior fec occ positive  - had EGD prior admission with gastritis  - iron sat low -  - iron infusions started in June 2018  - prior to [facility] or [facility], will need to review with Hematology given history of paraproteinemia as above   -patient is the GI team in July-to have colonoscopy and EGD     5) recurrent UTI/pyelo - as above; completed monosat       6) Vaccine - should stay up to date on innactivated vaccinations    -when safe, okay to take shingrex vaccine - new inactivated shingles vaccine     7) Lipids - as per PCP with regards to lipid check     8) BMD - DEXA due 2017        - monitor for now  -follow PTH and phosphorus     9) proteinuria -likely multifactorial     8) age appropriate ca screening - needs to remain up to date with mammogram an colonoscopy (next c scope is in 8/2018)     - needs to f/u with Derm Yearly - pt will call to make appt  - GI referral as patient is due this year for c scope and jony given prior positive fec occ-patient saw the GI team     11) depression - Patient states depression is well-controlled with the current regimen      - sertraline can interact with tac  Tac level stable  - pt is on 4-5 different anti psych/anti depressants  Pt will discuss with Psychiatry team      12) question of renal art stenosis - We'll continue to monitor for now  If blood pressures worsen her renal function worsens rapidly may need intervention     - have not placed on ACE or ARB   -see above     13) MGUS/IgG Kappa - follows with Hematology/Oncology     -patient may require chemotherapy in the near future     14) Fatigue - prior TSH nl  Unclear etiology  EBV IgM neg, BK PCR also  improved     15) hyponatremia - improved       16) pancreas transplant -      - failed pancreas allograft - following with Endocrine  - last A1c 4 8     17) Volume - relatively euvolemic, but has gained weight and trace edema LE     - cont furosemide    18) cellulitis periorbital    -improving with antibacterial treatment   -also is on Valtrex-patient advised to go to the ER call Ophthalmology if lesions come back or worsen   -today appear to be resolving  -spoke with the ophthalmologist over the phone today and reviewed the case    19) aortic regurg - on ECHO July with grade I dCHF     It was a pleasure of evaluating Ms Santamaria Spine in the renal office  Please do not hesitate to contact our team if further issues or questions arise in the interim          No problem-specific Assessment & Plan notes found for this encounter  HPI:    Patient with recent periorbital cellulitis  Improving  Blood pressure is relatively stable today though elevated  No nausea, no vomiting  PATIENT INSTRUCTIONS:    Patient Instructions   1) you canNOT get the zostavax vaccine for shingles; but you CAN get the Shingrix (zoster Vaccine recombinant adjuvant)  2) please do your labwork next week  3) please talk to your Psychiatrist about your medications for your mental health  Sertraline can sometimes interact with tacrolimus  Your tacrolimus levels are stable, but we should discuss these issues with your Psychiatry team  4) Please see your Cardiologist as you have planned tomorrow  5) continue your medications as you are doing  6) if the lesions around your eye come back or approach your eye lid or nose, you need to call your eye doctor right away and/or go to the ER if you are not able to get a hold of your eye doctor  OBJECTIVE:  Current Weight: Weight - Scale: 105 kg (231 lb)  Vitals:    07/30/18 0847   BP: 150/68   BP Location: Left arm   Patient Position: Sitting   Cuff Size: Standard   Weight: 105 kg (231 lb)   Height: 5' 8" (1 727 m)    Body mass index is 35 12 kg/m²  REVIEW OF SYSTEMS:    Review of Systems   Constitutional: Positive for fatigue  HENT: Negative  Cellulitis of eye, improving   Eyes: Negative  Respiratory: Negative  Negative for shortness of breath  Cardiovascular: Negative  Negative for leg swelling  Gastrointestinal: Negative  Endocrine: Negative  Genitourinary: Negative  Negative for difficulty urinating  Musculoskeletal: Negative  Skin: Negative  Allergic/Immunologic: Negative  Neurological: Negative  Hematological: Negative  Psychiatric/Behavioral: Negative  All other systems reviewed and are negative  PHYSICAL EXAM:      Physical Exam   Constitutional: She is oriented to person, place, and time  She appears well-developed and well-nourished   No distress  HENT:   Head: Normocephalic and atraumatic  Mouth/Throat: No oropharyngeal exudate  Eyes: Conjunctivae are normal  Right eye exhibits no discharge  Left eye exhibits no discharge  No scleral icterus  Cellulitis lesions are not appreciated today  There wall crusted lesions on the \[facility] [place] [facility]\"" of the left head  No pustules  Neck: Normal range of motion  Neck supple  No JVD present  Cardiovascular: Normal rate and regular rhythm  Exam reveals no gallop and no friction rub  No murmur heard  Pulmonary/Chest: Effort normal and breath sounds normal  No respiratory distress  She has no wheezes  She has no rales  Chest wall lesions are crusted  Abdominal: Soft  Bowel sounds are normal  She exhibits no distension  There is no tenderness  There is no rebound  Musculoskeletal: Normal range of motion  She exhibits no edema, tenderness or deformity  Neurological: She is alert and oriented to person, place, and time  Coordination normal    Skin: Skin is warm and dry  Rash noted  She is not diaphoretic  No erythema  No pallor  Psychiatric: She has a normal mood and affect  Her behavior is normal  Judgment and thought content normal    Nursing note and vitals reviewed        Medications:    Current Outpatient Prescriptions:     amLODIPine (NORVASC) 10 mg tablet, 10mg in the morning & 5mg in the evening, Disp: 45 tablet, Rfl: 3    ARIPiprazole (ABILIFY) 20 MG tablet, Take 1 tablet by mouth, Disp: , Rfl:     aspirin 81 MG tablet, Take 1 tablet by mouth daily, Disp: , Rfl:     BD PEN NEEDLE VISHNU U/F 32G X 4 MM MISC, USE FOUR TIMES DAILY, Disp: 90 each, Rfl: 0    busPIRone (BUSPAR) 5 mg tablet, Take 1 tablet (5 mg total) by mouth 2 (two) times a day for 90 days, Disp: 180 tablet, Rfl: 0    cephalexin (KEFLEX) 250 mg capsule, Take 2 capsules (500 mg total) by mouth 4 (four) times a day for 10 days, Disp: 80 capsule, Rfl: 0    Cholecalciferol (VITAMIN D3) 1000 units CAPS, Take by mouth, Disp: , Rfl:   cloNIDine (CATAPRES) 0 1 mg tablet, Take 0 3 mg by mouth every 8 (eight) hours  , Disp: , Rfl:     doxazosin (CARDURA) 1 mg tablet, Take 2 tablets (2 mg total) by mouth daily at bedtime (Patient taking differently: Take 3 mg by mouth daily at bedtime  ), Disp: 180 tablet, Rfl: 0    DULoxetine (CYMBALTA) 60 mg delayed release capsule, TAKE ONE CAPSULE BY MOUTH TWICE DAILY, Disp: 180 capsule, Rfl: 0    folic acid (FOLVITE) 1 mg tablet, Take 1 tablet by mouth daily, Disp: , Rfl:     furosemide (LASIX) 20 mg tablet, Take 1 tablet (20 mg total) by mouth daily, Disp: 90 tablet, Rfl: 4    HUMALOG KWIKPEN 100 units/mL injection pen, INJCT INSULIN PER SLIDING SCALE, IF BLOOD SUGAR 150-200=2UNITS, 201-250=4UNITS, 251-300=6UNITS, 301-350=8UNITS>350=10UNITS, Disp: 45 mL, Rfl: 2    hydrALAZINE (APRESOLINE) 50 mg tablet, TAKE 1 TABLET BY MOUTH THREE TIMES DAILY, Disp: 270 tablet, Rfl: 3    Insulin Glargine (TOUJEO SOLOSTAR) 300 units/mL CONCETRATED U-300 injection pen, Inject 5 Units under the skin daily at bedtime, Disp: 4 pen, Rfl: 0    Lancets (ONETOUCH ULTRASOFT) lancets, by Does not apply route 4 (four) times a day  , Disp: , Rfl:     levothyroxine 50 mcg tablet, TAKE 1 TABLET BY MOUTH ONLY ON SUNDAYS, TAKE WITH 112 MCG DOSE, Disp: 12 tablet, Rfl: 0    LORazepam (ATIVAN) 2 mg tablet, Take 1 tablet (2 mg total) by mouth 3 (three) times a day as needed for anxiety for up to 180 days, Disp: 270 tablet, Rfl: 1    metoprolol tartrate (LOPRESSOR) 50 mg tablet, TAKE 1 TABLET(50MG TOTAL) BY MOUTH TWICE DAILY, Disp: 180 tablet, Rfl: 5    ONE TOUCH ULTRA TEST test strip, Use 4 times daily ( please dispense One touch Ultra test strips), Disp: 400 each, Rfl: 1    pantoprazole (PROTONIX) 40 mg tablet, Take 40 mg by mouth daily, Disp: , Rfl:     pravastatin (PRAVACHOL) 80 mg tablet, TAKE 1 TABLET BY MOUTH DAILY, Disp: 90 tablet, Rfl: 5    predniSONE 5 mg tablet, Take 1 tablet by mouth daily, Disp: , Rfl:    rOPINIRole (REQUIP) 0 25 mg tablet, Take 1 tablet by mouth 2 (two) times a day, Disp: , Rfl:     sertraline (ZOLOFT) 100 mg tablet, Take 2 tablets (200 mg total) by mouth daily for 90 days, Disp: 180 tablet, Rfl: 0    sodium bicarbonate 650 mg tablet, Take 2 tablets by mouth 2 (two) times a day  , Disp: , Rfl:     sulfamethoxazole-trimethoprim (BACTRIM DS) 800-160 mg per tablet, Take 1 tablet by mouth daily for 10 days smx-tmp DS (BACTRIM) 800-160 mg tabs (1tab q12 D10), Disp: 10 tablet, Rfl: 0    tacrolimus (PROGRAF) 1 mg capsule, Take 3 mg by mouth daily 3 mg BID , Disp: , Rfl:     valACYclovir (VALTREX) 1,000 mg tablet, Take 1 tablet (1,000 mg total) by mouth 2 (two) times a day for 7 days, Disp: 14 tablet, Rfl: 0    Laboratory Results:    Results from last 7 days  Lab Units 07/26/18  0634 07/23/18  1324   WBC Thousand/uL 6 94 7 21   HEMOGLOBIN g/dL 10 3* 10 7*   HEMATOCRIT % 34 9 35 5   PLATELETS Thousands/uL 204 191   SODIUM mmol/L 138 139   POTASSIUM mmol/L 4 2 3 9   CHLORIDE mmol/L 108 106   CO2 mmol/L 24 25   BUN mg/dL 37* 35*   CREATININE mg/dL 2 11* 1 76*   CALCIUM mg/dL 9 0 9 6   GLUCOSE RANDOM mg/dL  --  105       Results for orders placed or performed in visit on 07/26/18   Protein / creatinine ratio, urine   Result Value Ref Range    Creatinine, Ur 46 5 mg/dL    Protein Urine Random 82 mg/dL    Prot/Creat Ratio, Ur 1 76 (H) 0 00 - 7 25   Basic metabolic panel   Result Value Ref Range    Sodium 138 136 - 145 mmol/L    Potassium 4 2 3 5 - 5 3 mmol/L    Chloride 108 100 - 108 mmol/L    CO2 24 21 - 32 mmol/L    Anion Gap 6 4 - 13 mmol/L    BUN 37 (H) 5 - 25 mg/dL    Creatinine 2 11 (H) 0 60 - 1 30 mg/dL    Glucose, Fasting 67 65 - 99 mg/dL    Calcium 9 0 8 3 - 10 1 mg/dL    eGFR 26 ml/min/1 73sq m   CBC   Result Value Ref Range    WBC 6 94 4 31 - 10 16 Thousand/uL    RBC 3 75 (L) 3 81 - 5 12 Million/uL    Hemoglobin 10 3 (L) 11 5 - 15 4 g/dL    Hematocrit 34 9 34 8 - 46 1 %    MCV 93 82 - 98 fL    MCH 27 5 26 8 - 34 3 pg    MCHC 29 5 (L) 31 4 - 37 4 g/dL    RDW 22 1 (H) 11 6 - 15 1 %    Platelets 278 950 - 994 Thousands/uL    MPV 12 0 8 9 - 12 7 fL

## 2018-08-01 ENCOUNTER — OFFICE VISIT (OUTPATIENT)
Dept: FAMILY MEDICINE CLINIC | Facility: CLINIC | Age: 53
End: 2018-08-01
Payer: MEDICARE

## 2018-08-01 VITALS
BODY MASS INDEX: 34.67 KG/M2 | SYSTOLIC BLOOD PRESSURE: 144 MMHG | DIASTOLIC BLOOD PRESSURE: 68 MMHG | TEMPERATURE: 97.4 F | WEIGHT: 228 LBS | RESPIRATION RATE: 16 BRPM | HEART RATE: 72 BPM

## 2018-08-01 DIAGNOSIS — N18.4 CHRONIC KIDNEY DISEASE, STAGE 4 (SEVERE) (HCC): ICD-10-CM

## 2018-08-01 DIAGNOSIS — B02.9 HERPES ZOSTER WITHOUT COMPLICATION: Primary | ICD-10-CM

## 2018-08-01 DIAGNOSIS — I10 ESSENTIAL HYPERTENSION: ICD-10-CM

## 2018-08-01 DIAGNOSIS — L03.213 PERIORBITAL CELLULITIS OF LEFT EYE: ICD-10-CM

## 2018-08-01 PROCEDURE — 99213 OFFICE O/P EST LOW 20 MIN: CPT | Performed by: FAMILY MEDICINE

## 2018-08-01 RX ORDER — HYDRALAZINE HYDROCHLORIDE 50 MG/1
50 TABLET, FILM COATED ORAL 3 TIMES DAILY
Qty: 270 TABLET | Refills: 3 | Status: SHIPPED | OUTPATIENT
Start: 2018-08-01 | End: 2019-07-20 | Stop reason: SDUPTHER

## 2018-08-01 NOTE — ASSESSMENT & PLAN NOTE
Crusty lesions on left temple resolved  Recommended to complete therapy with Valtrex as prescribed  According to nephrologist Dr Miller Plants, patient can get Shingrex vaccination in the future  Recommended patient to consult with her hematology -oncology   Dr Mateo Daily regarding vaccination for shingles

## 2018-08-01 NOTE — ASSESSMENT & PLAN NOTE
Blood pressure is stable  Recommended to continue current medical regimen  Reschedule follow-up visit with cardiologist Dr Quoc Galeano

## 2018-08-01 NOTE — PROGRESS NOTES
Chief Complaint   Patient presents with    Follow-up     1 week follow up  Assessment/Plan:    Herpes zoster without complication  Crusty lesions on left temple resolved  Recommended to complete therapy with Valtrex as prescribed  According to nephrologist Dr Chandrakant Penn, patient can get Shingrex vaccination in the future  Recommended patient to consult with her hematology -oncology   Dr Silverio Brink regarding vaccination for shingles  Periorbital cellulitis of left eye  Symptoms improved  Complete antibiotic therapy with Bactrim and Keflex  Essential hypertension  Blood pressure is stable  Recommended to continue current medical regimen  Reschedule follow-up visit with cardiologist Dr Galdino Carreno  [facility], [facility]  Management per nephrologist Dr Chandrakant Penn  Keep follow-up visit as scheduled in November Diagnoses and all orders for this visit:    Herpes zoster without complication    Periorbital cellulitis of left eye    Essential hypertension    Chronic kidney disease, stage 4 (severe) ([facility])          Subjective:        Patient ID: Bella Griffin is a 46 y o  female  HPI     Patient presents for 1 week follow-up for Herpes zoster and  periorbital cellulitis of left eye  Patient reports that she feels much better  Crusty lesions on L temple resolved after starting on Valtrex 500 mg twice daily one week ago  No fever, chills  Patient denies burning sensation around her L eye,   no left eye pain  No visual disturbances  Patient has 2 more days to complete antibiotic therapy for periorbital cellulitis of left eye  Currently taking Bactrim DS 1 tablet daily and   Keflex 500 mg 4 times daily  Denies side effects  Patient was seen by nephrologist Dr Chandrakant Penn on 7/30/18  No changes in medications were made  According to Dr Lori Delacruz letter patient can get Shingrex vaccination in the future      PMHx: HTN, Type 1 DM, CKD stage 4, history of renal and pancreatic transplant in 1998,  indolent multiple myeloma, Hypothyroidism  Reviewed all current medications  Patient canceled her appointment with cardiology   Dr Amelie Curry  yesterday because was feeling tired  She has follow-up office visit with hematology -oncology Dr Mika Elizondo in 10/18  The following portions of the patient's history were reviewed and updated as appropriate: allergies, current medications, past family history, past medical history, past social history, past surgical history and problem list     Review of Systems   Constitutional: Positive for fatigue  Negative for activity change, appetite change, chills and fever  HENT: Negative for congestion, ear pain, hearing loss, nosebleeds, sore throat, tinnitus and trouble swallowing  Eyes: Negative for pain, discharge, redness, itching and visual disturbance  Respiratory: Negative for cough, chest tightness, shortness of breath and wheezing  Cardiovascular: Negative for chest pain, palpitations and leg swelling  Gastrointestinal: Negative for abdominal pain, diarrhea, nausea and vomiting  Genitourinary: Negative for difficulty urinating, dysuria, frequency and hematuria  Musculoskeletal: Positive for arthralgias and gait problem (gait instability  Patient ambulates with a cane)  Negative for joint swelling  Skin:        See HPI   Neurological: Negative for dizziness and headaches  Hematological: Negative for adenopathy  Bruises/bleeds easily  Objective:      /68 (BP Location: Left arm, Patient Position: Sitting, Cuff Size: Standard)   Pulse 72   Temp (!) 97 4 °F (36 3 °C) (Tympanic)   Resp 16   Wt 103 kg (228 lb)   LMP  (LMP Unknown)   BMI 34 67 kg/m²          Physical Exam   Constitutional: She is oriented to person, place, and time  She appears well-developed and well-nourished  Obese   HENT:   Head: Normocephalic and atraumatic     Right Ear: External ear normal    Left Ear: External ear normal  Mouth/Throat: Oropharynx is clear and moist    Eyes: Conjunctivae are normal  Pupils are equal, round, and reactive to light  Neck: Normal range of motion  Neck supple  No JVD present  Cardiovascular: Normal rate, regular rhythm and normal heart sounds  No murmur heard  L carotid bruit  No BL LE edema   Pulmonary/Chest: Effort normal and breath sounds normal  She has no wheezes  She has no rales  Abdominal: Soft  Bowel sounds are normal  There is no tenderness  Musculoskeletal:   Patient ambulates with a cane  No joint swelling or tenderness   Neurological: She is alert and oriented to person, place, and time  No cranial nerve deficit  Coordination normal    Skin: Skin is warm and dry  Crusty lesions on L temple resolved  Small scab at the L eye corner   Few scabs on upper anterior chest    Psychiatric: She has a normal mood and affect  Her behavior is normal    Nursing note and vitals reviewed

## 2018-08-03 ENCOUNTER — TELEPHONE (OUTPATIENT)
Dept: NEPHROLOGY | Facility: CLINIC | Age: 53
End: 2018-08-03

## 2018-08-03 ENCOUNTER — TELEPHONE (OUTPATIENT)
Dept: FAMILY MEDICINE CLINIC | Facility: CLINIC | Age: 53
End: 2018-08-03

## 2018-08-03 NOTE — TELEPHONE ENCOUNTER
Please call patient and Dr Frankey  office  She completed antiviral therapy, skin lesions are crusted and she is no longer contagious

## 2018-08-03 NOTE — TELEPHONE ENCOUNTER
Called the patient to inform the patient [address] will see the patient for renal artery stenosis  Spoke through message in to the physician there who will be seeing the patient  I attempted to make an appointment for the patient but there was a long wait and therefore have sent a task to our office to help with making an appointment  Patient was also concern that had zoster and today at the GI office was told that given that the patient is infected, cannot have GI studies  I reviewed with the patient that on my exam, the patient had crusted lesions and therefore should not be infectious to others at this juncture  Unless exam has changed? I advised the patient to also call the PCP office to inform them of today's occurrence  Advised pt to have patient's mother call their oncologist to also inform them    Called endocscopy center - [phone] - per their exam, patient said that these lesions were new and did not mention that pt had already started treatment  And, therefore, the exam was held

## 2018-08-03 NOTE — TELEPHONE ENCOUNTER
Hello    Thank you  The records release is fine and then sending records  But, pt needs to be seen by Dr Yulissa Escobedo and Dr Yulissa Escobedo is aware of this patient already  So I spoke with carlota to try to clear that part up  If you would not mind making sure all of the records are send - can be renal ultrasound, renal doplar, labwork, and progress notes from our office, hematology, endocrinology       Thank you    jessica

## 2018-08-03 NOTE — TELEPHONE ENCOUNTER
I called and spoke with Benedetto Klinefelter from [facility] to schedule Shanna Sihn for appointment  She stated that Shanna Shin has not been seen in the past 5 years and would therefore be a new patient  Because of that, she would need to pass along her information to Johnson Salomon (post-kidney transplant coordinator)  Benedetto Klinefelter did mention that they would need [facility] first before appointment is scheduled  I will send over a records release form to Medical Records  Shaka Stearns will reach out to the patient once they receive all records and assign her a physician

## 2018-08-05 DIAGNOSIS — F41.1 GAD (GENERALIZED ANXIETY DISORDER): ICD-10-CM

## 2018-08-05 DIAGNOSIS — F33.1 MAJOR DEPRESSIVE DISORDER, RECURRENT EPISODE, MODERATE (HCC): Primary | Chronic | ICD-10-CM

## 2018-08-07 RX ORDER — SERTRALINE HYDROCHLORIDE 100 MG/1
TABLET, FILM COATED ORAL
Qty: 180 TABLET | Refills: 0 | Status: SHIPPED | OUTPATIENT
Start: 2018-08-07 | End: 2018-10-12

## 2018-08-10 DIAGNOSIS — E78.2 MIXED HYPERLIPIDEMIA: ICD-10-CM

## 2018-08-10 RX ORDER — PRAVASTATIN SODIUM 80 MG/1
TABLET ORAL
Qty: 30 TABLET | Refills: 6 | Status: SHIPPED | OUTPATIENT
Start: 2018-08-10 | End: 2018-08-10 | Stop reason: SDUPTHER

## 2018-08-10 RX ORDER — PRAVASTATIN SODIUM 80 MG/1
TABLET ORAL
Qty: 90 TABLET | Refills: 3 | Status: SHIPPED | OUTPATIENT
Start: 2018-08-10 | End: 2018-11-26 | Stop reason: SDUPTHER

## 2018-08-13 ENCOUNTER — TELEPHONE (OUTPATIENT)
Dept: ENDOCRINOLOGY | Facility: CLINIC | Age: 53
End: 2018-08-13

## 2018-08-13 DIAGNOSIS — E11.8 UNCONTROLLED TYPE 2 DIABETES MELLITUS WITH COMPLICATION, UNSPECIFIED WHETHER LONG TERM INSULIN USE: ICD-10-CM

## 2018-08-13 DIAGNOSIS — E11.65 UNCONTROLLED TYPE 2 DIABETES MELLITUS WITH COMPLICATION, UNSPECIFIED WHETHER LONG TERM INSULIN USE: ICD-10-CM

## 2018-08-13 NOTE — TELEPHONE ENCOUNTER
Current diabetic medications patient is taking:     Toujeo 7 units @ HS  Humalog - sliding scale    Blood sugars reviewed  Fasting blood sugars tightly controlled  Please inform patient, to reduce Toujeo to 5 units at bedtime   Continue humalog sliding scale , 150-200 -1 units, 201-250-2 units, 251-300-3 units, 301-350-4 units >350-5 units   Call if blood sugar < 70 or >350     Gloria Guo MD

## 2018-08-13 NOTE — TELEPHONE ENCOUNTER
Log dated 8/13/2018 scanned and attached to this encounter  Current diabetic medications patient is taking: Toujeo 7 units @ HS  Humalog - sliding scale    Please review and recommend

## 2018-08-14 DIAGNOSIS — I10 BENIGN ESSENTIAL HTN: ICD-10-CM

## 2018-08-14 RX ORDER — AMLODIPINE BESYLATE 10 MG/1
TABLET ORAL
Qty: 45 TABLET | Refills: 3 | Status: SHIPPED | OUTPATIENT
Start: 2018-08-14 | End: 2018-08-14 | Stop reason: SDUPTHER

## 2018-08-14 RX ORDER — AMLODIPINE BESYLATE 10 MG/1
TABLET ORAL
Qty: 135 TABLET | Refills: 3 | Status: SHIPPED | OUTPATIENT
Start: 2018-08-14 | End: 2019-06-05 | Stop reason: SDUPTHER

## 2018-08-15 ENCOUNTER — TELEPHONE (OUTPATIENT)
Dept: NEPHROLOGY | Facility: CLINIC | Age: 53
End: 2018-08-15

## 2018-08-15 NOTE — TELEPHONE ENCOUNTER
Spoke with the [facility] team -     Since renal function has been stable  Dr Cherrie Lilly at [facility] will review with their IR to see options - CO2 angio vs MRI without jason to eval further vs u/s repeat    Unclear if patient would benefit from intervention

## 2018-08-22 ENCOUNTER — TELEPHONE (OUTPATIENT)
Dept: NEPHROLOGY | Facility: CLINIC | Age: 53
End: 2018-08-22

## 2018-08-22 ENCOUNTER — TELEPHONE (OUTPATIENT)
Dept: ENDOCRINOLOGY | Facility: CLINIC | Age: 53
End: 2018-08-22

## 2018-08-22 DIAGNOSIS — E11.65 UNCONTROLLED TYPE 2 DIABETES MELLITUS WITH COMPLICATION, UNSPECIFIED WHETHER LONG TERM INSULIN USE: ICD-10-CM

## 2018-08-22 DIAGNOSIS — E11.8 UNCONTROLLED TYPE 2 DIABETES MELLITUS WITH COMPLICATION, UNSPECIFIED WHETHER LONG TERM INSULIN USE: ICD-10-CM

## 2018-08-22 NOTE — TELEPHONE ENCOUNTER
Log dated 8/22/2018 scanned and attached to this encounter  Current diabetic medications patient is taking: Toujeo 5 units @ HS  Humalog - sliding scale of 2 units per 50 > 150     Please review and recommend

## 2018-08-22 NOTE — TELEPHONE ENCOUNTER
Discussed with pt, about the providers recommendations   Changes made to medlist   Pt verbalized understanding

## 2018-08-22 NOTE — TELEPHONE ENCOUNTER
Toujeo 5 units @ HS  Humalog - sliding scale of 2 units per 50 > 150   Blood sugars relatively well controlled  Sometimes having low blood sugars in the morning  Please inform to reduce toujeo to 4 units   Continue Humalog at current dose    Tessa Torres MD

## 2018-08-22 NOTE — TELEPHONE ENCOUNTER
Patient called and said she needs to have surgery at [facility] for an artery blockage  Alphonse wants her seen ASAP with Dr Riddhi Kan before they can proceed with scheduling surgery  Patient has bloodwork for Dr Riddhi Kan she is getting done tomorrow  Her appointment now is 9/25 and she wants to get in sooner so she can get this surgery scheduled  Can we get her in sooner? I didn't see anything in the schedule  Thanks

## 2018-08-22 NOTE — TELEPHONE ENCOUNTER
Patient called stating that she is awaiting follow-up from our office so the patient can have the IR procedure for renal artery stenosis completed  -I informed the patient that she should continue with her blood work that she has scheduled for tomorrow  After this we can determine if it is safe for the contrast procedure  -patient states her blood pressures have been rising to 180 recently  I asked the patient to obtain blood work from the right arm  Will follow up with the patient regarding this  Patient has been taking blood pressures in the left arm and has a left subclavian stenosis

## 2018-08-23 ENCOUNTER — TRANSCRIBE ORDERS (OUTPATIENT)
Dept: ADMINISTRATIVE | Age: 53
End: 2018-08-23

## 2018-08-23 ENCOUNTER — LAB (OUTPATIENT)
Dept: LAB | Age: 53
End: 2018-08-23
Payer: MEDICARE

## 2018-08-23 DIAGNOSIS — E11.65 TYPE 2 DIABETES MELLITUS WITH HYPERGLYCEMIA, WITH LONG-TERM CURRENT USE OF INSULIN (HCC): ICD-10-CM

## 2018-08-23 DIAGNOSIS — Z94.0 RENAL TRANSPLANT RECIPIENT: ICD-10-CM

## 2018-08-23 DIAGNOSIS — Z79.4 TYPE 2 DIABETES MELLITUS WITH HYPERGLYCEMIA, WITH LONG-TERM CURRENT USE OF INSULIN (HCC): ICD-10-CM

## 2018-08-23 LAB
25(OH)D3 SERPL-MCNC: 42.3 NG/ML (ref 30–100)
ALBUMIN SERPL BCP-MCNC: 2.8 G/DL (ref 3.5–5)
ALP SERPL-CCNC: 116 U/L (ref 46–116)
ALT SERPL W P-5'-P-CCNC: 30 U/L (ref 12–78)
ANION GAP SERPL CALCULATED.3IONS-SCNC: 9 MMOL/L (ref 4–13)
AST SERPL W P-5'-P-CCNC: 22 U/L (ref 5–45)
BACTERIA UR QL AUTO: ABNORMAL /HPF
BILIRUB SERPL-MCNC: 0.27 MG/DL (ref 0.2–1)
BILIRUB UR QL STRIP: NEGATIVE
BUN SERPL-MCNC: 30 MG/DL (ref 5–25)
CALCIUM SERPL-MCNC: 9.6 MG/DL (ref 8.3–10.1)
CHLORIDE SERPL-SCNC: 110 MMOL/L (ref 100–108)
CLARITY UR: ABNORMAL
CO2 SERPL-SCNC: 19 MMOL/L (ref 21–32)
COLOR UR: YELLOW
CREAT SERPL-MCNC: 1.66 MG/DL (ref 0.6–1.3)
CREAT UR-MCNC: 79.5 MG/DL
GFR SERPL CREATININE-BSD FRML MDRD: 35 ML/MIN/1.73SQ M
GLUCOSE P FAST SERPL-MCNC: 103 MG/DL (ref 65–99)
GLUCOSE UR STRIP-MCNC: NEGATIVE MG/DL
HGB UR QL STRIP.AUTO: NEGATIVE
HYALINE CASTS #/AREA URNS LPF: ABNORMAL /LPF
KETONES UR STRIP-MCNC: NEGATIVE MG/DL
LEUKOCYTE ESTERASE UR QL STRIP: ABNORMAL
MAGNESIUM SERPL-MCNC: 2.1 MG/DL (ref 1.6–2.6)
NITRITE UR QL STRIP: POSITIVE
NON-SQ EPI CELLS URNS QL MICRO: ABNORMAL /HPF
PH UR STRIP.AUTO: 7 [PH] (ref 4.5–8)
PHOSPHATE SERPL-MCNC: 3.6 MG/DL (ref 2.7–4.5)
POTASSIUM SERPL-SCNC: 4.2 MMOL/L (ref 3.5–5.3)
PROT SERPL-MCNC: 9.2 G/DL (ref 6.4–8.2)
PROT UR STRIP-MCNC: ABNORMAL MG/DL
PROT UR-MCNC: 251 MG/DL
PROT/CREAT UR: 3.16 MG/G{CREAT} (ref 0–0.1)
PTH-INTACT SERPL-MCNC: 19.3 PG/ML (ref 18.4–80.1)
RBC #/AREA URNS AUTO: ABNORMAL /HPF
SODIUM SERPL-SCNC: 138 MMOL/L (ref 136–145)
SP GR UR STRIP.AUTO: 1.01 (ref 1–1.03)
UROBILINOGEN UR QL STRIP.AUTO: 0.2 E.U./DL
WBC #/AREA URNS AUTO: ABNORMAL /HPF

## 2018-08-23 PROCEDURE — 81001 URINALYSIS AUTO W/SCOPE: CPT

## 2018-08-23 PROCEDURE — 80053 COMPREHEN METABOLIC PANEL: CPT

## 2018-08-23 PROCEDURE — 80197 ASSAY OF TACROLIMUS: CPT

## 2018-08-23 PROCEDURE — 84156 ASSAY OF PROTEIN URINE: CPT

## 2018-08-23 PROCEDURE — 84100 ASSAY OF PHOSPHORUS: CPT

## 2018-08-23 PROCEDURE — 83970 ASSAY OF PARATHORMONE: CPT

## 2018-08-23 PROCEDURE — 83735 ASSAY OF MAGNESIUM: CPT

## 2018-08-23 PROCEDURE — 82570 ASSAY OF URINE CREATININE: CPT

## 2018-08-23 PROCEDURE — 82306 VITAMIN D 25 HYDROXY: CPT

## 2018-08-23 PROCEDURE — 36415 COLL VENOUS BLD VENIPUNCTURE: CPT

## 2018-08-23 NOTE — PROGRESS NOTES
Hello    Patient normally is followed up by Ms Ramya Forte  The patient's urine suggests a lot of white cells and a possible infection  Patient is colonized from prior also  Therefore we need to see if the patient has any urinary symptoms   -  Can we find out from the patient if she has any urinary complaints  Dysuria, he fevers, suprapubic tenderness?  -can we also see if the urine culture can be run on the urinalysis from this morning?     Thank you    np

## 2018-08-24 ENCOUNTER — TELEPHONE (OUTPATIENT)
Dept: OTHER | Facility: HOSPITAL | Age: 53
End: 2018-08-24

## 2018-08-24 ENCOUNTER — APPOINTMENT (OUTPATIENT)
Dept: LAB | Age: 53
End: 2018-08-24
Payer: MEDICARE

## 2018-08-24 ENCOUNTER — TRANSCRIBE ORDERS (OUTPATIENT)
Dept: LAB | Age: 53
End: 2018-08-24

## 2018-08-24 DIAGNOSIS — N39.0 RECURRENT UTI: Primary | ICD-10-CM

## 2018-08-24 DIAGNOSIS — N39.0 RECURRENT UTI: ICD-10-CM

## 2018-08-24 LAB — TACROLIMUS BLD-MCNC: 8.3 NG/ML (ref 2–20)

## 2018-08-24 PROCEDURE — 87186 SC STD MICRODIL/AGAR DIL: CPT

## 2018-08-24 PROCEDURE — 87077 CULTURE AEROBIC IDENTIFY: CPT

## 2018-08-24 PROCEDURE — 87086 URINE CULTURE/COLONY COUNT: CPT

## 2018-08-24 NOTE — TELEPHONE ENCOUNTER
----- Message from Betito Mclaughlin sent at 8/24/2018 11:17 AM EDT -----  Spoke with the patient and she is not having any UTI symptoms and the I spoke with Ramya Mancini at the lab and they will add the urine culture   ----- Message -----  From: Francisca Maritnez MD  Sent: 8/23/2018   4:36 PM  To: Nephrology [address] [place] [address]    Patient normally is followed up by Ms Sally Fonseca  The patient's urine suggests a lot of white cells and a possible infection  Patient is colonized from prior also  Therefore we need to see if the patient has any urinary symptoms   -  Can we find out from the patient if she has any urinary complaints  Dysuria, he fevers, suprapubic tenderness?  -can we also see if the urine culture can be run on the urinalysis from this morning?     Thank you    np

## 2018-08-26 LAB — BACTERIA UR CULT: ABNORMAL

## 2018-08-26 NOTE — PROGRESS NOTES
Hello    Patient normally is followed up by Ms Sonya Anna  Can the patient be notified that the urine culture shows E coli which the patient is colonized with  If the patient has any urinary symptoms are not feeling well, then the patient needs to call and go to the hospital as the only medication that may work now is IV antibiotics  -can the patient continues sodium bicarbonate tablets  -can you please find out from the patient who we need to call to give clearance for the interventional radiology procedure down at the [address] [place] [address]  -from the renal standpoint the patient is okay for this procedure as the renal function has improved    Can they also be notified that the patient is colonized with E coli in the urine   -can the patient have continued blood work in 1 month which the patient normally does for transplant labs    Thank you    np

## 2018-08-27 ENCOUNTER — TELEPHONE (OUTPATIENT)
Dept: NEPHROLOGY | Facility: CLINIC | Age: 53
End: 2018-08-27

## 2018-08-27 NOTE — TELEPHONE ENCOUNTER
----- Message from Gerhard Valdez MD sent at 8/26/2018  6:39 PM EDT -----  Hello    Patient normally is followed up by Ms Rachelle Webb  Can the patient be notified that the urine culture shows E coli which the patient is colonized with  If the patient has any urinary symptoms are not feeling well, then the patient needs to call and go to the hospital as the only medication that may work now is IV antibiotics  -can the patient continues sodium bicarbonate tablets  -can you please find out from the patient who we need to call to give clearance for the interventional radiology procedure down at the [address] [place] [address]  -from the renal standpoint the patient is okay for this procedure as the renal function has improved    Can they also be notified that the patient is colonized with E coli in the urine   -can the patient have continued blood work in 1 month which the patient normally does for transplant labs    Thank you    np

## 2018-08-28 ENCOUNTER — TRANSCRIBE ORDERS (OUTPATIENT)
Dept: ADMINISTRATIVE | Age: 53
End: 2018-08-28

## 2018-08-28 ENCOUNTER — LAB (OUTPATIENT)
Dept: LAB | Age: 53
End: 2018-08-28
Payer: MEDICARE

## 2018-08-28 DIAGNOSIS — E55.9 AVITAMINOSIS D: Primary | ICD-10-CM

## 2018-08-28 DIAGNOSIS — E03.9 MYXEDEMA HEART DISEASE: ICD-10-CM

## 2018-08-28 DIAGNOSIS — I51.9 MYXEDEMA HEART DISEASE: ICD-10-CM

## 2018-08-28 DIAGNOSIS — E55.9 AVITAMINOSIS D: ICD-10-CM

## 2018-08-28 LAB
25(OH)D3 SERPL-MCNC: 45.2 NG/ML (ref 30–100)
ALBUMIN SERPL BCP-MCNC: 2.8 G/DL (ref 3.5–5)
ANION GAP SERPL CALCULATED.3IONS-SCNC: 8 MMOL/L (ref 4–13)
BUN SERPL-MCNC: 35 MG/DL (ref 5–25)
CALCIUM ALBUM COR SERPL-MCNC: 11.7 MG/DL (ref 8.3–10.1)
CALCIUM SERPL-MCNC: 10.7 MG/DL (ref 8.3–10.1)
CALCIUM SERPL-MCNC: 10.7 MG/DL (ref 8.3–10.1)
CHLORIDE SERPL-SCNC: 110 MMOL/L (ref 100–108)
CO2 SERPL-SCNC: 18 MMOL/L (ref 21–32)
CREAT SERPL-MCNC: 1.88 MG/DL (ref 0.6–1.3)
EST. AVERAGE GLUCOSE BLD GHB EST-MCNC: 97 MG/DL
GFR SERPL CREATININE-BSD FRML MDRD: 30 ML/MIN/1.73SQ M
GLUCOSE P FAST SERPL-MCNC: 125 MG/DL (ref 65–99)
HBA1C MFR BLD: 5 % (ref 4.2–6.3)
POTASSIUM SERPL-SCNC: 4.6 MMOL/L (ref 3.5–5.3)
SODIUM SERPL-SCNC: 136 MMOL/L (ref 136–145)
T4 FREE SERPL-MCNC: 0.91 NG/DL (ref 0.76–1.46)
TSH SERPL DL<=0.05 MIU/L-ACNC: 2.96 UIU/ML (ref 0.36–3.74)

## 2018-08-28 PROCEDURE — 82040 ASSAY OF SERUM ALBUMIN: CPT

## 2018-08-28 PROCEDURE — 84439 ASSAY OF FREE THYROXINE: CPT

## 2018-08-28 PROCEDURE — 36415 COLL VENOUS BLD VENIPUNCTURE: CPT

## 2018-08-28 PROCEDURE — 84443 ASSAY THYROID STIM HORMONE: CPT

## 2018-08-28 PROCEDURE — 83036 HEMOGLOBIN GLYCOSYLATED A1C: CPT

## 2018-08-28 PROCEDURE — 80048 BASIC METABOLIC PNL TOTAL CA: CPT

## 2018-08-28 PROCEDURE — 82306 VITAMIN D 25 HYDROXY: CPT

## 2018-08-30 ENCOUNTER — TELEPHONE (OUTPATIENT)
Dept: NEPHROLOGY | Facility: CLINIC | Age: 53
End: 2018-08-30

## 2018-08-30 NOTE — TELEPHONE ENCOUNTER
Here are patients BP reading           Am                                             8/17- sitting 173/61  Pulse -63  8/17 standing 170/56 Pulse-63  8/18-sitting 163/60  Pulse-67  8/18-standing 158/54  Pulse-66  8/19-sitting 161/55  Pulse-67  8/19-standing 152/53  Pulse-67  8/20-sitting 163/55   Pulse-67   8/20-standing 152/53  Pulse-67  8/21-sitting 170/59  Pulse-67   8/21-standing 161/59  Pulse-65  8/22-sitting 174/62  Pulse-74  8/22-standing 165/58  Pulse-74  8/23-sitting 166/58  Pulse-63  8/23-standing 164/51  Pulse-63    Pm   8/17-sitting 174/62   Pulse-69  8/17-standing 177/55  Pulse-68  8/18-sitting  151/55  Pulse-65  8/18-standing  144/52  Pulse-65  8/19-sitting  169/58  Pulse-71  8/19-standing  162/58  Pulse-71  8/20-sitting  165/57  Pulse-75  8/20-standing  153/52  Pulse-73  8/21-sitting  167/60  Pulse-71  8/21-standing  159/57  Pulse-70  8/22-sitting  163/57  Pulse-66  8/22-standing  158/53  Pulse-65  8/23-sitting  171/61  Pulse-65  8/23-standing  162/54  Pulse-64

## 2018-09-02 DIAGNOSIS — F33.1 MAJOR DEPRESSIVE DISORDER, RECURRENT EPISODE, MODERATE (HCC): Primary | Chronic | ICD-10-CM

## 2018-09-04 ENCOUNTER — TELEPHONE (OUTPATIENT)
Dept: NEPHROLOGY | Facility: CLINIC | Age: 53
End: 2018-09-04

## 2018-09-04 RX ORDER — ARIPIPRAZOLE 30 MG/1
30 TABLET ORAL
Qty: 90 TABLET | Refills: 0 | Status: SHIPPED | OUTPATIENT
Start: 2018-09-04 | End: 2018-12-09 | Stop reason: SDUPTHER

## 2018-09-04 NOTE — TELEPHONE ENCOUNTER
Patient sent in her recent BP     AM     8/24 sitting - 194/71    Pulse-86  8/24 standing - 179/60  Pulse-83  8/25 sitting- 187/61  Pulse-68  8/25 standing - 171/55  Pulse-69  8/26 sitting - 154/59  Pulse-62  8/26 standing - 141/50  Pulse-63  8/27 sitting - 165/67  Pulse-70  8/27 standing -  159/58  Pulse-71  8/28 sitting  - 155/54  Pulse-63  8/28 standing - 154/52  Pulse-63  8/29 sitting - 185/68  Pulse-70  8/29 standing - 181/57  Pulse-68  8/30 sitting - 164/61  Pulse-64  8/30 standing - 153/59  Pulse-62      Pm    8/24 sitting - 158/59  Pulse-70  8/24 standing - 152/54  Pulse-69  8/25 sitting - 175/60  Pulse-69  8/25 standing - 172/59  Pulse-69  8/26 sitting - 163/57  Pulse-63  8/26 standing - 160/54   Pulse-62  8/27 sitting - 166/60  Pulse-71  8/27 standing - 165/56  Pulse-71  8/28 sitting - 177/55  Pulse-70  8/28 standing - 173/53   Pulse-70  8/29 sitting - 178/56  Pulse-69  8/29 standing - 174/51  Pulse-69  8/30 sitting - 175/60  Pulse-70  8/30 standing - 161/56  Pulse-68

## 2018-09-07 ENCOUNTER — TELEPHONE (OUTPATIENT)
Dept: ENDOCRINOLOGY | Facility: CLINIC | Age: 53
End: 2018-09-07

## 2018-09-07 NOTE — TELEPHONE ENCOUNTER
Log dated 9/7/2018 scanned and attached to this encounter  Current diabetic medications patient is taking: Toujeo 5 u @ HS  Humalog - sliding scale     Please review and recommend

## 2018-09-10 NOTE — TELEPHONE ENCOUNTER
Current diabetic medications patient is taking:     Toujeo 5 u @ HS  Humalog - sliding scale     Blood sugars relatively well controlled  Continue current regimen  Please inform patient    Cherlynn Lefort, MD

## 2018-09-12 ENCOUNTER — TRANSCRIBE ORDERS (OUTPATIENT)
Dept: ADMINISTRATIVE | Age: 53
End: 2018-09-12

## 2018-09-12 ENCOUNTER — APPOINTMENT (OUTPATIENT)
Dept: LAB | Age: 53
End: 2018-09-12
Payer: MEDICARE

## 2018-09-12 DIAGNOSIS — Z01.818 PRE-OP TESTING: ICD-10-CM

## 2018-09-12 DIAGNOSIS — Z01.818 PRE-OP TESTING: Primary | ICD-10-CM

## 2018-09-12 LAB
INR PPP: 1.02 (ref 0.86–1.17)
PROTHROMBIN TIME: 13.5 SECONDS (ref 11.8–14.2)

## 2018-09-12 PROCEDURE — 36415 COLL VENOUS BLD VENIPUNCTURE: CPT

## 2018-09-12 PROCEDURE — 85610 PROTHROMBIN TIME: CPT

## 2018-09-16 DIAGNOSIS — F33.1 MAJOR DEPRESSIVE DISORDER, RECURRENT EPISODE, MODERATE (HCC): Primary | Chronic | ICD-10-CM

## 2018-09-16 DIAGNOSIS — F41.1 GAD (GENERALIZED ANXIETY DISORDER): ICD-10-CM

## 2018-09-17 RX ORDER — DULOXETIN HYDROCHLORIDE 60 MG/1
CAPSULE, DELAYED RELEASE ORAL
Qty: 180 CAPSULE | Refills: 0 | OUTPATIENT
Start: 2018-09-17

## 2018-09-17 RX ORDER — DULOXETIN HYDROCHLORIDE 60 MG/1
60 CAPSULE, DELAYED RELEASE ORAL 2 TIMES DAILY
Qty: 180 CAPSULE | Refills: 0 | Status: SHIPPED | OUTPATIENT
Start: 2018-09-17 | End: 2018-12-18 | Stop reason: SDUPTHER

## 2018-09-17 NOTE — TELEPHONE ENCOUNTER
Dee Bloch may need to see you first so I am sending this to you  FYI  I last filled this Rx for her in 6/2018 for a 90 day supply while you were away

## 2018-09-19 DIAGNOSIS — E10.8 TYPE 1 DIABETES MELLITUS WITH COMPLICATION (HCC): ICD-10-CM

## 2018-09-20 ENCOUNTER — TRANSCRIBE ORDERS (OUTPATIENT)
Dept: ADMINISTRATIVE | Age: 53
End: 2018-09-20

## 2018-09-20 ENCOUNTER — TELEPHONE (OUTPATIENT)
Dept: NEPHROLOGY | Facility: CLINIC | Age: 53
End: 2018-09-20

## 2018-09-20 ENCOUNTER — APPOINTMENT (OUTPATIENT)
Dept: LAB | Age: 53
End: 2018-09-20
Payer: MEDICARE

## 2018-09-20 DIAGNOSIS — E87.2 ACIDEMIA: Primary | ICD-10-CM

## 2018-09-20 DIAGNOSIS — Z94.0 KIDNEY REPLACED BY TRANSPLANT: Primary | ICD-10-CM

## 2018-09-20 DIAGNOSIS — Z94.0 KIDNEY REPLACED BY TRANSPLANT: ICD-10-CM

## 2018-09-20 LAB
ALBUMIN SERPL BCP-MCNC: 2.6 G/DL (ref 3.5–5)
ALP SERPL-CCNC: 106 U/L (ref 46–116)
ALT SERPL W P-5'-P-CCNC: 31 U/L (ref 12–78)
ANION GAP SERPL CALCULATED.3IONS-SCNC: 10 MMOL/L (ref 4–13)
AST SERPL W P-5'-P-CCNC: 20 U/L (ref 5–45)
BACTERIA UR QL AUTO: ABNORMAL /HPF
BILIRUB SERPL-MCNC: 0.36 MG/DL (ref 0.2–1)
BILIRUB UR QL STRIP: NEGATIVE
BUN SERPL-MCNC: 32 MG/DL (ref 5–25)
CALCIUM SERPL-MCNC: 9.1 MG/DL (ref 8.3–10.1)
CHLORIDE SERPL-SCNC: 110 MMOL/L (ref 100–108)
CLARITY UR: ABNORMAL
CO2 SERPL-SCNC: 19 MMOL/L (ref 21–32)
COLOR UR: YELLOW
CREAT SERPL-MCNC: 1.79 MG/DL (ref 0.6–1.3)
CREAT UR-MCNC: 77.6 MG/DL
GFR SERPL CREATININE-BSD FRML MDRD: 32 ML/MIN/1.73SQ M
GLUCOSE P FAST SERPL-MCNC: 78 MG/DL (ref 65–99)
GLUCOSE UR STRIP-MCNC: NEGATIVE MG/DL
HGB UR QL STRIP.AUTO: NEGATIVE
KETONES UR STRIP-MCNC: NEGATIVE MG/DL
LEUKOCYTE ESTERASE UR QL STRIP: ABNORMAL
MAGNESIUM SERPL-MCNC: 2.1 MG/DL (ref 1.6–2.6)
MUCOUS THREADS UR QL AUTO: ABNORMAL
NITRITE UR QL STRIP: POSITIVE
NON-SQ EPI CELLS URNS QL MICRO: ABNORMAL /HPF
PH UR STRIP.AUTO: 6.5 [PH] (ref 4.5–8)
PHOSPHATE SERPL-MCNC: 3.4 MG/DL (ref 2.7–4.5)
POTASSIUM SERPL-SCNC: 3.6 MMOL/L (ref 3.5–5.3)
PROT SERPL-MCNC: 8.8 G/DL (ref 6.4–8.2)
PROT UR STRIP-MCNC: ABNORMAL MG/DL
PROT UR-MCNC: 131 MG/DL
PROT/CREAT UR: 1.69 MG/G{CREAT} (ref 0–0.1)
RBC #/AREA URNS AUTO: ABNORMAL /HPF
SODIUM SERPL-SCNC: 139 MMOL/L (ref 136–145)
SP GR UR STRIP.AUTO: 1.01 (ref 1–1.03)
TACROLIMUS BLD-MCNC: 5.8 NG/ML (ref 2–20)
UROBILINOGEN UR QL STRIP.AUTO: 0.2 E.U./DL
WBC #/AREA URNS AUTO: ABNORMAL /HPF

## 2018-09-20 PROCEDURE — 80197 ASSAY OF TACROLIMUS: CPT

## 2018-09-20 PROCEDURE — 83735 ASSAY OF MAGNESIUM: CPT

## 2018-09-20 PROCEDURE — 81001 URINALYSIS AUTO W/SCOPE: CPT | Performed by: INTERNAL MEDICINE

## 2018-09-20 PROCEDURE — 36415 COLL VENOUS BLD VENIPUNCTURE: CPT

## 2018-09-20 PROCEDURE — 84156 ASSAY OF PROTEIN URINE: CPT | Performed by: INTERNAL MEDICINE

## 2018-09-20 PROCEDURE — 80053 COMPREHEN METABOLIC PANEL: CPT

## 2018-09-20 PROCEDURE — 84100 ASSAY OF PHOSPHORUS: CPT

## 2018-09-20 PROCEDURE — 82570 ASSAY OF URINE CREATININE: CPT | Performed by: INTERNAL MEDICINE

## 2018-09-20 RX ORDER — SODIUM BICARBONATE 650 MG/1
1300 TABLET ORAL 3 TIMES DAILY
Qty: 180 TABLET | Refills: 5 | Status: SHIPPED | OUTPATIENT
Start: 2018-09-20 | End: 2019-04-18 | Stop reason: SDUPTHER

## 2018-09-20 NOTE — TELEPHONE ENCOUNTER
Reviewed with the patient the blood work  Creatinine is stable  Protein creatinine ratio is slightly improved  Bicarbonate is still low     -also reviewed the patient's blood pressure monitor at home  Blood pressures are elevated, but the patient is going next Tuesday for intervention on the renal artery stenosis  Therefore no changes for now with the exception of increasing sodium bicarbonate tablets to 2 tablets 3 times a day   -given the urine with pyuria  Patient has no symptoms  Patient is likely colonized    Patient knows to call right away if develops urinary symptoms and we will treat at this time for urinary tract infection  -lab work 1 week after the procedure  -patient is aware

## 2018-09-21 ENCOUNTER — TELEPHONE (OUTPATIENT)
Dept: ENDOCRINOLOGY | Facility: CLINIC | Age: 53
End: 2018-09-21

## 2018-09-21 NOTE — TELEPHONE ENCOUNTER
Log dated 9/21/2018 scanned and attached to this encounter  Current diabetic medications patient is taking: Toujeo 5 units @ HS  Humalog - sliding scale    Please review and recommend

## 2018-09-21 NOTE — TELEPHONE ENCOUNTER
Discussed with pt, about the results as per provider comment   Pt verbalized understanding  [facility] already updated

## 2018-09-21 NOTE — TELEPHONE ENCOUNTER
Toujeo 5 units @ HS  Humalog - sliding scale    Blood sugars are very well controlled  Sometimes fasting blood sugar is in 70 range  Please inform patient to reduce toujeo to 4 units       Butler Lanes, MD

## 2018-10-03 ENCOUNTER — TELEPHONE (OUTPATIENT)
Dept: ENDOCRINOLOGY | Facility: CLINIC | Age: 53
End: 2018-10-03

## 2018-10-03 NOTE — TELEPHONE ENCOUNTER
Log dated 10/3/2018 scanned and attached to this encounter  Current diabetic medications patient is taking: Toujeo 4 units @ HS  Humalog  - sliding scale    Please review and recommend

## 2018-10-10 ENCOUNTER — APPOINTMENT (OUTPATIENT)
Dept: LAB | Age: 53
End: 2018-10-10
Payer: MEDICARE

## 2018-10-10 ENCOUNTER — TRANSCRIBE ORDERS (OUTPATIENT)
Dept: ADMINISTRATIVE | Age: 53
End: 2018-10-10

## 2018-10-10 DIAGNOSIS — E85.9 MYELOMA ASSOCIATED AMYLOIDOSIS (HCC): Primary | ICD-10-CM

## 2018-10-10 DIAGNOSIS — C90.00 MYELOMA ASSOCIATED AMYLOIDOSIS (HCC): Primary | ICD-10-CM

## 2018-10-10 DIAGNOSIS — E85.9 MYELOMA ASSOCIATED AMYLOIDOSIS (HCC): ICD-10-CM

## 2018-10-10 DIAGNOSIS — C90.00 MYELOMA ASSOCIATED AMYLOIDOSIS (HCC): ICD-10-CM

## 2018-10-10 LAB
BASOPHILS # BLD AUTO: 0.08 THOUSANDS/ΜL (ref 0–0.1)
BASOPHILS NFR BLD AUTO: 1 % (ref 0–1)
EOSINOPHIL # BLD AUTO: 0.22 THOUSAND/ΜL (ref 0–0.61)
EOSINOPHIL NFR BLD AUTO: 2 % (ref 0–6)
ERYTHROCYTE [DISTWIDTH] IN BLOOD BY AUTOMATED COUNT: 16.3 % (ref 11.6–15.1)
HCT VFR BLD AUTO: 37 % (ref 34.8–46.1)
HGB BLD-MCNC: 11.4 G/DL (ref 11.5–15.4)
IGA SERPL-MCNC: 57 MG/DL (ref 70–400)
IGG SERPL-MCNC: 2440 MG/DL (ref 700–1600)
IGM SERPL-MCNC: 31 MG/DL (ref 40–230)
IMM GRANULOCYTES # BLD AUTO: 0.08 THOUSAND/UL (ref 0–0.2)
IMM GRANULOCYTES NFR BLD AUTO: 1 % (ref 0–2)
LDH SERPL-CCNC: 167 U/L (ref 81–234)
LYMPHOCYTES # BLD AUTO: 1.41 THOUSANDS/ΜL (ref 0.6–4.47)
LYMPHOCYTES NFR BLD AUTO: 12 % (ref 14–44)
MCH RBC QN AUTO: 31.2 PG (ref 26.8–34.3)
MCHC RBC AUTO-ENTMCNC: 30.8 G/DL (ref 31.4–37.4)
MCV RBC AUTO: 101 FL (ref 82–98)
MONOCYTES # BLD AUTO: 0.7 THOUSAND/ΜL (ref 0.17–1.22)
MONOCYTES NFR BLD AUTO: 6 % (ref 4–12)
NEUTROPHILS # BLD AUTO: 9.74 THOUSANDS/ΜL (ref 1.85–7.62)
NEUTS SEG NFR BLD AUTO: 78 % (ref 43–75)
NRBC BLD AUTO-RTO: 0 /100 WBCS
PLATELET # BLD AUTO: 191 THOUSANDS/UL (ref 149–390)
PMV BLD AUTO: 12.9 FL (ref 8.9–12.7)
RBC # BLD AUTO: 3.65 MILLION/UL (ref 3.81–5.12)
WBC # BLD AUTO: 12.23 THOUSAND/UL (ref 4.31–10.16)

## 2018-10-10 PROCEDURE — 83615 LACTATE (LD) (LDH) ENZYME: CPT

## 2018-10-10 PROCEDURE — 84165 PROTEIN E-PHORESIS SERUM: CPT

## 2018-10-10 PROCEDURE — 85025 COMPLETE CBC W/AUTO DIFF WBC: CPT

## 2018-10-10 PROCEDURE — 84165 PROTEIN E-PHORESIS SERUM: CPT | Performed by: PATHOLOGY

## 2018-10-10 PROCEDURE — 86334 IMMUNOFIX E-PHORESIS SERUM: CPT

## 2018-10-10 PROCEDURE — 82784 ASSAY IGA/IGD/IGG/IGM EACH: CPT

## 2018-10-10 PROCEDURE — 83883 ASSAY NEPHELOMETRY NOT SPEC: CPT

## 2018-10-11 ENCOUNTER — TELEPHONE (OUTPATIENT)
Dept: ENDOCRINOLOGY | Facility: CLINIC | Age: 53
End: 2018-10-11

## 2018-10-11 DIAGNOSIS — E10.8 TYPE 1 DIABETES MELLITUS WITH COMPLICATION (HCC): ICD-10-CM

## 2018-10-11 DIAGNOSIS — I10 ESSENTIAL HYPERTENSION: ICD-10-CM

## 2018-10-11 DIAGNOSIS — E55.9 VITAMIN D DEFICIENCY: ICD-10-CM

## 2018-10-11 DIAGNOSIS — E03.9 ACQUIRED HYPOTHYROIDISM: Primary | ICD-10-CM

## 2018-10-11 LAB
ALBUMIN SERPL ELPH-MCNC: 3.05 G/DL (ref 3.5–5)
ALBUMIN SERPL ELPH-MCNC: 37.6 % (ref 52–65)
ALPHA1 GLOB SERPL ELPH-MCNC: 0.46 G/DL (ref 0.1–0.4)
ALPHA1 GLOB SERPL ELPH-MCNC: 5.7 % (ref 2.5–5)
ALPHA2 GLOB SERPL ELPH-MCNC: 1 G/DL (ref 0.4–1.2)
ALPHA2 GLOB SERPL ELPH-MCNC: 12.4 % (ref 7–13)
BETA GLOB ABNORMAL SERPL ELPH-MCNC: 0.41 G/DL (ref 0.4–0.8)
BETA1 GLOB SERPL ELPH-MCNC: 5.1 % (ref 5–13)
BETA2 GLOB SERPL ELPH-MCNC: 7.3 % (ref 2–8)
BETA2+GAMMA GLOB SERPL ELPH-MCNC: 0.59 G/DL (ref 0.2–0.5)
GAMMA GLOB ABNORMAL SERPL ELPH-MCNC: 2.58 G/DL (ref 0.5–1.6)
GAMMA GLOB SERPL ELPH-MCNC: 31.9 % (ref 12–22)
IGG/ALB SER: 0.6 {RATIO} (ref 1.1–1.8)
INTERPRETATION UR IFE-IMP: NORMAL
KAPPA LC FREE SER-MCNC: 56.8 MG/L (ref 3.3–19.4)
KAPPA LC FREE/LAMBDA FREE SER: 3.09 {RATIO} (ref 0.26–1.65)
LAMBDA LC FREE SERPL-MCNC: 18.4 MG/L (ref 5.7–26.3)
M PEAK ID 2: 27.9 %
M PROTEIN 1 MFR SERPL ELPH: 5.3 %
M PROTEIN 1 SERPL ELPH-MCNC: 0.43 G/DL
M PROTEIN 2 SERPL ELPH-MCNC: 2.26 G/DL
PROT SERPL-MCNC: 8.1 G/DL (ref 6.4–8.2)

## 2018-10-11 NOTE — TELEPHONE ENCOUNTER
She will need TSH, free T4, basic metabolic profile, B5C, urine microalbumin creatinine ratio, lipid profile before her next appointment    Rudy Matamoros MD

## 2018-10-11 NOTE — TELEPHONE ENCOUNTER
----- Message from Rocco Bradford sent at 10/11/2018 10:55 AM EDT -----  Regarding: labs for next appt  What labs would you like completed prior to next appt?    ----- Message -----  From: Sherin Lobo  Sent: 10/11/2018  10:41 AM  To: Rocco Bradford    Heads up pt was scheduled for 11/5 but rescheduled for 2/8/19

## 2018-10-12 ENCOUNTER — OFFICE VISIT (OUTPATIENT)
Dept: NEPHROLOGY | Facility: CLINIC | Age: 53
End: 2018-10-12
Payer: MEDICARE

## 2018-10-12 VITALS
HEART RATE: 68 BPM | BODY MASS INDEX: 33.74 KG/M2 | WEIGHT: 222.6 LBS | SYSTOLIC BLOOD PRESSURE: 140 MMHG | DIASTOLIC BLOOD PRESSURE: 72 MMHG | HEIGHT: 68 IN

## 2018-10-12 DIAGNOSIS — R82.81 PYURIA: Primary | ICD-10-CM

## 2018-10-12 DIAGNOSIS — N18.4 CKD (CHRONIC KIDNEY DISEASE), STAGE IV (HCC): ICD-10-CM

## 2018-10-12 PROCEDURE — 99215 OFFICE O/P EST HI 40 MIN: CPT | Performed by: INTERNAL MEDICINE

## 2018-10-12 RX ORDER — DOXAZOSIN MESYLATE 1 MG/1
3 TABLET ORAL
Qty: 90 TABLET | Refills: 3
Start: 2018-10-12 | End: 2019-02-25 | Stop reason: SDUPTHER

## 2018-10-12 NOTE — PATIENT INSTRUCTIONS
1) IF YOU HAVE ANY URINARY SYMPTOMS SUCH AS FEVER, PAIN WITH  URINATING, INCONTINENT, BURNING, NOT FEELING WELL, OR YOUR TYPICAL SYMPTOMS - GO TO NEAREST ER AS YOU WILL NEED IV ANTIBIOTICS AS YOU HAVE RESISTANT E COLI  2)  Live vaccinations (for example Varicella, Zostavax, yellow fever) are contraindicated and should NOT be administered   Shingrix (zoster Vaccine recombinant adjuvant) is available and can be given to post transplant patients (recently FDA approved)  3) IF YOU KEEP HAVING DIZZY EPISODES, LOWER DOXAZOSIN TO 2 MG AT NIGHT  4) no changes to your medications today

## 2018-10-12 NOTE — LETTER
October 12, 2018     [address]    Patient: Valeria Back   YOB: 1965   Date of Visit: 10/12/2018       Dear Dr Tevin Weller:    Thank you for referring Valeria Back to me for evaluation  Below are my notes for this consultation  If you have questions, please do not hesitate to call me  I look forward to following your patient along with you  Sincerely,        Boris Thurston MD        CC: No Recipients  Boris Thurston MD  10/12/2018 10:00 AM  Sign at close encounter  [facility] 46 y o  female MRN: [id number]  10/12/2018    Reason for Visit: renal transplant, CKD III    ASSESSMENT and PLAN:    I had the pleasure of seeing Ms Kanchan Flynn today in the renal clinic for the continued management of CKD, renal transplant  Since our last visit, there has been no ER visits or hospitalizations  He currently has no complaints at this time and is feeling well  Patient denies any chest pain, shortness of breath and swelling  The last blood work was done on 10/10, which we have reviewed together  45 yo female with PMHx of renal pancreas transplant 1998, DM I, failed pancreas transplant 2017, HTN, recurrent UTI/pyelo, recurrent, 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, Did require dialysis in 2014, orthostasis, anemia, hypothyroid, 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, dCHF, aortic regurg, dCHF grade I, zoster in July, subclavian art stenosis on Left presents for follow-up visit for renal transplant     Patient has had multiple admissions and issues recently     9/2017 -acute kidney injury, urinary frequency   Treated initially for UTI      10/2017 -urinary incontinence   Was started on antibiotics for possible UTI   Was started on Bactrim prophylaxis      11/2017 -abdominal pain   Nausea   Confusion   Depression and anxiety   Patient had EGD which showed gastritis  [place] volume expansion        2/2018-depression and anxiety   Treated for UTI also        07/2018-treated for urinary tract infection; also was seen in the ER on 07/23 for periorbital cellulitis    9/2018 - renal artery study at [facility] - patent renal artery and anastamosis that did not require intervention     1) Renal transplant - Baseline Cr 1 6 - 1 9 mg/dL  recurrent UTI/pyelo, recurrent episodes of EDUARDO due to volume depletion  BK negative  Has history of renal artery stenosis     - Cr upper end of baseline 1 95 in october  - follow renal function closely  - advised to drink 2 L fluids daily  - has renal artery stenosis > 60%-s/p renal artery study without stenosis  - UA with 1+ protein, positive nitrite, large leuk, 20-30 WBC  - UPCR 1 62     2) Immunosuppression - given Hematological issues, goal tac lower end 4-5     - tac was reduced to 3 mg BID-goal tacrolimus level should be closer to 4   - recent tac 6 7 on 10/10 - for now, no changes as patient is going for labwork in 2 weeks  - Continue prednisone 5 mg     3) HTN - history of orthostasis and low diast BP  widened pulse pressure possible due to aortic valve regurg? needs afterload reduction     -today blood pressures are 140s in the office   - pt states now BP rarely goes above 170; mainly in 160s over 50s  - amlodipine 10 mg in AM and 5 mg in PM, clonidine 0 3 TID, doxazosin 3 mg nightly, metoprolol 50 BID, hydralazine 50 TID  -renal artery study with stenosis present - saw [facility] team and had IR procedure on 9/25/18 with CO2 study and 5 cc contrast utilized - the transplant artery is slightly tortuous but the artery was widely patent  - pt has had recent episodes of dizziness  Intermittent  Rare  Lower doxazosin if needed  Monitor for UTI closely        4) Anemia -      - prior fec occ positive  - had EGD prior with gastritis  - iron sat low -  - iron infusions started in June 2018  - prior to [facility] or [facility], will need to review with Hematology given history of paraproteinemia as above   -patient is the GI team in July-to have colonoscopy and EGD - this was held due to skin infection at the time  - Hb rising 11 4, but also with WBC elevated 12 2 - concentrated specimen?     5) recurrent UTI/pyelo - as above; completed monosat  - monitor clinically  - pt's E coli has become resistant over time  - advised patient that patient needs to go to ER if develops urinary symptoms as has resistant bacteria     6) Vaccine - should stay up to date on innactivated vaccinations     -when safe, okay to take shingrex vaccine - new inactivated shingles vaccine     7) Lipids - as per PCP with regards to lipid check     8) BMD - DEXA due 2017        - monitor for now  -follow PTH and phosphorus     9) proteinuria -likely multifactorial due to advanced age of kidney/allograft nephropathy, paraproteinemia       8) age appropriate ca screening - needs to remain up to date with mammogram an colonoscopy (next c scope is in 8/2018)     - needs to f/u with Derm Year[facility] - pt will call to make appt  - awaiting repeat EGD with GI     11) depression - Patient states depression is well-controlled with the current regimen      - pt is now off sertraline  - pt is on 4-5 different anti psych/anti depressants  Pt will discuss with Psychiatry team      12) question of renal art stenosis - not evident on study     13) MGUS/IgG Kappa - follows with Hematology/Oncology      -patient may require chemotherapy in the near future  - repeat k/l chains on 10/10 was 3 09     14) Fatigue - prior TSH nl  Unclear etiology  EBV IgM neg, BK PCR also   improved     15) hyponatremia - improved       16) pancreas transplant -      - failed pancreas allograft - following with Endocrine  - last A1c 4 8     17) Volume - relatively euvolemic, but has gained weight and trace edema LE     - cont furosemide     18) cellulitis periorbital     -zoster  - now resolved     19) aortic regurg - on ECHO July with grade I dCHF     20) subclavian art stenosis on L    21) acid/base - bicarb tabs were increased to three times/day    - continue for now  Bicarb improved      It was a pleasure of evaluating Ms Fannie Armas in the renal office  Please do not hesitate to contact our team if further issues or questions arise in the interim         No problem-specific Assessment & Plan notes found for this encounter  HPI:    Patient completed renal artery study which was unrevealing  BP improving  No urinary complaints  PATIENT INSTRUCTIONS:    Patient Instructions   1) IF YOU HAVE ANY URINARY SYMPTOMS SUCH AS FEVER, PAIN WITH  URINATING, INCONTINENT, BURNING, NOT FEELING WELL, OR YOUR TYPICAL SYMPTOMS - GO TO NEAREST ER AS YOU WILL NEED IV ANTIBIOTICS AS YOU HAVE RESISTANT E COLI  2)  Live vaccinations (for example Varicella, Zostavax, yellow fever) are contraindicated and should NOT be administered  Shingrix (zoster Vaccine recombinant adjuvant) is available and can be given to post transplant patients (recently FDA approved)  3) IF YOU KEEP HAVING DIZZY EPISODES, LOWER DOXAZOSIN TO 2 MG AT NIGHT  4) no changes to your medications today        OBJECTIVE:  Current Weight: Weight - Scale: 101 kg (222 lb 9 6 oz)  Vitals:    10/12/18 0857   BP: 140/72   BP Location: Left arm   Patient Position: Sitting   Cuff Size: Standard   Pulse: 68   Weight: 101 kg (222 lb 9 6 oz)   Height: 5' 8" (1 727 m)    Body mass index is 33 85 kg/m²  REVIEW OF SYSTEMS:    Review of Systems   Constitutional: Negative  Negative for fatigue  HENT: Negative  Eyes: Negative  Respiratory: Negative  Negative for shortness of breath  Cardiovascular: Negative  Negative for leg swelling  Gastrointestinal: Negative  Endocrine: Negative  Genitourinary: Negative  Negative for difficulty urinating  Musculoskeletal: Negative  Skin: Negative  Allergic/Immunologic: Negative  Neurological: Negative  Hematological: Negative      Psychiatric/Behavioral: Negative  All other systems reviewed and are negative  PHYSICAL EXAM:      Physical Exam   Constitutional: She is oriented to person, place, and time  She appears well-developed and well-nourished  No distress  HENT:   Head: Normocephalic and atraumatic  Mouth/Throat: No oropharyngeal exudate  Eyes: Conjunctivae are normal  Right eye exhibits no discharge  Left eye exhibits no discharge  No scleral icterus  Neck: Normal range of motion  Neck supple  No JVD present  Cardiovascular: Normal rate and regular rhythm  Exam reveals no gallop and no friction rub  No murmur heard  Pulmonary/Chest: Effort normal and breath sounds normal  No respiratory distress  She has no wheezes  She has no rales  Abdominal: Soft  Bowel sounds are normal  She exhibits no distension  There is no tenderness  There is no rebound  Musculoskeletal: Normal range of motion  She exhibits no edema, tenderness or deformity  Neurological: She is alert and oriented to person, place, and time  Coordination normal    Skin: Skin is warm and dry  No rash noted  She is not diaphoretic  No erythema  No pallor  Psychiatric: She has a normal mood and affect  Her behavior is normal  Judgment and thought content normal    Nursing note and vitals reviewed        Medications:    Current Outpatient Prescriptions:     amLODIPine (NORVASC) 10 mg tablet, TAKE 1 TABLET BY MOUTH EVERY MORNING AND 1/2 TABLET EVERY EVENING, Disp: 135 tablet, Rfl: 3    ARIPiprazole (ABILIFY) 30 mg tablet, Take 1 tablet (30 mg total) by mouth daily at bedtime for 90 days, Disp: 90 tablet, Rfl: 0    aspirin 81 MG tablet, Take 1 tablet by mouth daily, Disp: , Rfl:     busPIRone (BUSPAR) 5 mg tablet, Take 1 tablet (5 mg total) by mouth 2 (two) times a day for 90 days, Disp: 180 tablet, Rfl: 0    Cholecalciferol (VITAMIN D3) 1000 units CAPS, Take by mouth, Disp: , Rfl:     cloNIDine (CATAPRES) 0 1 mg tablet, Take 0 3 mg by mouth every 8 (eight) hours  , Disp: , Rfl:     doxazosin (CARDURA) 1 mg tablet, Take 3 tablets (3 mg total) by mouth daily at bedtime, Disp: 90 tablet, Rfl: 3    DULoxetine (CYMBALTA) 60 mg delayed release capsule, Take 1 capsule (60 mg total) by mouth 2 (two) times a day for 90 days, Disp: 180 capsule, Rfl: 0    folic acid (FOLVITE) 1 mg tablet, Take 1 tablet by mouth daily, Disp: , Rfl:     furosemide (LASIX) 20 mg tablet, Take 1 tablet (20 mg total) by mouth daily, Disp: 90 tablet, Rfl: 4    HUMALOG KWIKPEN 100 units/mL injection pen, INJCT INSULIN PER SLIDING SCALE, IF BLOOD SUGAR 150-200=2UNITS, 201-250=4UNITS, 251-300=6UNITS, 301-350=8UNITS>350=10UNITS, Disp: 45 mL, Rfl: 2    hydrALAZINE (APRESOLINE) 50 mg tablet, Take 1 tablet (50 mg total) by mouth 3 (three) times a day for 90 days, Disp: 270 tablet, Rfl: 3    Insulin Glargine (TOUJEO SOLOSTAR) 300 units/mL CONCETRATED U-300 injection pen, Inject 4 Units under the skin daily at bedtime, Disp: 4 pen, Rfl: 0    Insulin Pen Needle (BD PEN NEEDLE VISHNU U/F) 32G X 4 MM MISC, Use 4 times daily, Disp: 400 each, Rfl: 1    Lancets (ONETOUCH ULTRASOFT) lancets, by Does not apply route 4 (four) times a day  , Disp: , Rfl:     levothyroxine 50 mcg tablet, TAKE 1 TABLET BY MOUTH ONLY ON SUNDAYS, TAKE WITH 112 MCG DOSE, Disp: 12 tablet, Rfl: 0    LORazepam (ATIVAN) 2 mg tablet, Take 1 tablet (2 mg total) by mouth 3 (three) times a day as needed for anxiety for up to 180 days, Disp: 270 tablet, Rfl: 1    metoprolol tartrate (LOPRESSOR) 50 mg tablet, TAKE 1 TABLET(50MG TOTAL) BY MOUTH TWICE DAILY, Disp: 180 tablet, Rfl: 5    ONE TOUCH ULTRA TEST test strip, Use 4 times daily ( please dispense One touch Ultra test strips), Disp: 400 each, Rfl: 1    pantoprazole (PROTONIX) 40 mg tablet, Take 40 mg by mouth daily, Disp: , Rfl:     pravastatin (PRAVACHOL) 80 mg tablet, TAKE 1 TABLET BY MOUTH DAILY, Disp: 90 tablet, Rfl: 5    pravastatin (PRAVACHOL) 80 mg tablet, TAKE 1 TABLET BY MOUTH EVERY DAY, Disp: 90 tablet, Rfl: 3    predniSONE 5 mg tablet, Take 1 tablet by mouth daily, Disp: , Rfl:     rOPINIRole (REQUIP) 0 25 mg tablet, Take 1 tablet by mouth 2 (two) times a day, Disp: , Rfl:     sodium bicarbonate 650 mg tablet, Take 2 tablets (1,300 mg total) by mouth 3 (three) times a day, Disp: 180 tablet, Rfl: 5    tacrolimus (PROGRAF) 1 mg capsule, Take 3 mg by mouth 2 (two) times a day 3 mg BID , Disp: , Rfl:     Laboratory Results:    Results from last 7 days  Lab Units 10/10/18  0746   WBC Thousand/uL 12 23*   HEMOGLOBIN g/dL 11 4*   HEMATOCRIT % 37 0   PLATELETS Thousands/uL 191   SODIUM mmol/L 134*   POTASSIUM mmol/L 4 5   CHLORIDE mmol/L 104   CO2 mmol/L 25   BUN mg/dL 26*   CREATININE mg/dL 1 95*   CALCIUM mg/dL 9 3   MAGNESIUM mg/dL 2 5   PHOSPHORUS mg/dL 3 6       Results for orders placed or performed in visit on 10/10/18   CBC and differential   Result Value Ref Range    WBC 12 23 (H) 4 31 - 10 16 Thousand/uL    RBC 3 65 (L) 3 81 - 5 12 Million/uL    Hemoglobin 11 4 (L) 11 5 - 15 4 g/dL    Hematocrit 37 0 34 8 - 46 1 %     (H) 82 - 98 fL    MCH 31 2 26 8 - 34 3 pg    MCHC 30 8 (L) 31 4 - 37 4 g/dL    RDW 16 3 (H) 11 6 - 15 1 %    MPV 12 9 (H) 8 9 - 12 7 fL    Platelets 966 657 - 559 Thousands/uL    nRBC 0 /100 WBCs    Neutrophils Relative 78 (H) 43 - 75 %    Immat GRANS % 1 0 - 2 %    Lymphocytes Relative 12 (L) 14 - 44 %    Monocytes Relative 6 4 - 12 %    Eosinophils Relative 2 0 - 6 %    Basophils Relative 1 0 - 1 %    Neutrophils Absolute 9 74 (H) 1 85 - 7 62 Thousands/µL    Immature Grans Absolute 0 08 0 00 - 0 20 Thousand/uL    Lymphocytes Absolute 1 41 0 60 - 4 47 Thousands/µL    Monocytes Absolute 0 70 0 17 - 1 22 Thousand/µL    Eosinophils Absolute 0 22 0 00 - 0 61 Thousand/µL    Basophils Absolute 0 08 0 00 - 0 10 Thousands/µL   Protein electrophoresis, serum   Result Value Ref Range    A/G Ratio 0 60 (L) 1 10 - 1 80    Albumin Electrophoresis 37 6 (L) 52 0 - 65 0 %    Albumin CONC 3 05 (L) 3 50 - 5 00 g/dl    Alpha 1 5 7 (H) 2 5 - 5 0 %    ALPHA 1 CONC 0 46 (H) 0 10 - 0 40 g/dL    Alpha 2 12 4 7 0 - 13 0 %    ALPHA 2 CONC 1 00 0 40 - 1 20 g/dL    Beta-1 5 1 5 0 - 13 0 %    BETA 1 CONC 0 41 0 40 - 0 80 g/dL    Beta-2 7 3 2 0 - 8 0 %    BETA 2 CONC 0 59 (H) 0 20 - 0 50 g/dL    Gamma Globulin 31 9 (H) 12 0 - 22 0 %    GAMMA CONC 2 58 (H) 0 50 - 1 60 g/dL    M Peak ID 1 5 30 %    M PEAK 1 CONC 0 43 g/dL    M Peak ID 2 27 90 %    M Peak 2 CONC 2 26 g/dL    SPEP Interpretation       The serum total protein is within normal limits with hypoalbuminemia  The serum protein electrophoresis shows increased alpha-1 and  beta-2 regions  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  Previous immunofixation identified two IgG kappa bands  Repeat immunofixation to be performed  Reviewed by: Gary Gottron Tellschow, MD ([id number]) **Electronic Signature**    Total Protein 8 1 6 4 - 8 2 g/dL   LD,Blood   Result Value Ref Range     81 - 234 U/L   IgG, IgA, IgM   Result Value Ref Range    IGA 57 0 (L) 70 0 - 400 0 mg/dL    IGG 2,440 0 (H) 700 0-1,600 0 mg/dL    IGM 31 0 (L) 40 0 - 230 0 mg/dL   Immunoglobulin free LT chains blood   Result Value Ref Range    Ig Kappa Free Light Chain 56 8 (H) 3 3 - 19 4 mg/L    Ig Lambda Free Light Chain 18 4 5 7 - 26 3 mg/L    Kappa/Lambda FluidC Ratio 3 09 (H) 0 26 - 1 65   Immunofixation, Serum(Reflex Only-Do Not Order)   Result Value Ref Range    Immunofixation Interpretation       Serum immunofixation shows a biclonal gammopathy identified as IgG kappa (M-Peak 1 = 0 43 g/dL and M-Peak 2 = 2 26 g/dL)  Reviewed by: Gary Gottron Tellschow, MD  ([id number])  **Electronic Signature**

## 2018-10-12 NOTE — PROGRESS NOTES
NEPHROLOGY OFFICE VISIT   Margot Napoles 46 y o  female MRN: [id number]  10/12/2018    Reason for Visit: renal transplant, CKD III    ASSESSMENT and PLAN:    I had the pleasure of seeing Ms Sammie Peabody today in the renal clinic for the continued management of CKD, renal transplant  Since our last visit, there has been no ER visits or hospitalizations  He currently has no complaints at this time and is feeling well  Patient denies any chest pain, shortness of breath and swelling  The last blood work was done on 10/10, which we have reviewed together  45 yo female with PMHx of renal pancreas transplant 1998, DM I, failed pancreas transplant 2017, HTN, recurrent UTI/pyelo, recurrent, 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, Did require dialysis in 2014, orthostasis, anemia, hypothyroid, 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, dCHF, aortic regurg, dCHF grade I, zoster in July, subclavian art stenosis on Left presents for follow-up visit for renal transplant     Patient has had multiple admissions and issues recently     9/2017 -acute kidney injury, urinary frequency   Treated initially for UTI      10/2017 -urinary incontinence   Was started on antibiotics for possible UTI   Was started on Bactrim prophylaxis      11/2017 -abdominal pain  Nausea   Confusion   Depression and anxiety   Patient had EGD which showed gastritis   Given volume expansion        2/2018-depression and anxiety   Treated for UTI also        07/2018-treated for urinary tract infection; also was seen in the ER on 07/23 for periorbital cellulitis    9/2018 - renal artery study at [facility] - patent renal artery and anastamosis that did not require intervention     1) Renal transplant - Baseline Cr 1 6 - 1 9 mg/dL  recurrent UTI/pyelo, recurrent episodes of EDUARDO due to volume depletion  BK negative  Has history of renal artery stenosis        - Cr upper end of baseline 1 95 in october  - follow renal function closely  - advised to drink 2 L fluids daily  - has renal artery stenosis > 60%-s/p renal artery study without stenosis  - UA with 1+ protein, positive nitrite, large leuk, 20-30 WBC  - UPCR 1 62  - pt with no urinary symptoms today - pt was advised as below     2) Immunosuppression - given Hematological issues, goal tac lower end 4-5     - tac was reduced to 3 mg BID-goal tacrolimus level should be closer to 4   - recent tac 6 7 on 10/10 - for now, no changes as patient is going for labwork in 2 weeks  - Continue prednisone 5 mg     3) HTN - history of orthostasis and low diast BP  widened pulse pressure possible due to aortic valve regurg? needs afterload reduction     -today blood pressures are 140s in the office   - pt states now BP rarely goes above 170; mainly in 160s over 50s  - amlodipine 10 mg in AM and 5 mg in PM, clonidine 0 3 TID, doxazosin 3 mg nightly, metoprolol 50 BID, hydralazine 50 TID  -renal artery study with stenosis present - saw [facility] team and had IR procedure on 9/25/18 with CO2 study and 5 cc contrast utilized - the transplant artery is slightly tortuous but the artery was widely patent  - pt has had recent episodes of dizziness  Intermittent  Rare  Lower doxazosin if needed  Monitor for UTI closely     4) Anemia -      - prior fec occ positive  - had EGD prior with gastritis  - iron sat low -  - iron infusions started in June 2018  - prior to [facility] or [facility], will need to review with Hematology given history of paraproteinemia as above   -patient is the GI team in July-to have colonoscopy and EGD - this was held due to skin infection at the time  - Hb rising 11 4, but also with WBC elevated 12 2 - concentrated specimen?     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, paraproteinemia       8) age appropriate ca screening - needs to remain up to date with mammogram an colonoscopy (next c scope is in 8/2018)     - needs to f/u with Derm Yearly - pt will call to make appt  - awaiting repeat EGD with GI     11) depression - Patient states depression is well-controlled with the current regimen      - pt is now off sertraline  - pt is on 4-5 different anti psych/anti depressants  Pt will discuss with Psychiatry team      12) question of renal art stenosis - not evident on study     13) MGUS/IgG Kappa - follows with Hematology/Oncology      -patient may require chemotherapy in the near future  - repeat k/l chains on 10/10 was 3 09     14) Fatigue - prior TSH nl  Unclear etiology  EBV IgM neg, BK PCR also  improved     15) hyponatremia - improved       16) pancreas transplant -      - failed pancreas allograft - following with Endocrine  - last A1c 4 8     17) Volume - relatively euvolemic, but has gained weight and trace edema LE     - cont furosemide     18) cellulitis periorbital     -zoster  - now resolved     19) aortic regurg - on ECHO July with grade I dCHF     20) subclavian art stenosis on L    21) acid/base - bicarb tabs were increased to three times/day    - continue for now  Bicarb improved      It was a pleasure of evaluating Ms Erick Casillas in the renal office  Please do not hesitate to contact our team if further issues or questions arise in the interim         No problem-specific Assessment & Plan notes found for this encounter  HPI:    Patient completed renal artery study which was unrevealing  BP improving  No urinary complaints  PATIENT INSTRUCTIONS:    Patient Instructions   1) IF YOU HAVE ANY URINARY SYMPTOMS SUCH AS FEVER, PAIN WITH  URINATING, INCONTINENT, BURNING, NOT FEELING WELL, OR YOUR TYPICAL SYMPTOMS - GO TO NEAREST ER AS YOU WILL NEED IV ANTIBIOTICS AS YOU HAVE RESISTANT E COLI  2)  Live vaccinations (for example Varicella, Zostavax, yellow fever) are contraindicated and should NOT be administered  Shingrix (zoster Vaccine recombinant adjuvant) is available and can be given to post transplant patients (recently FDA approved)  3) IF YOU KEEP HAVING DIZZY EPISODES, LOWER DOXAZOSIN TO 2 MG AT NIGHT  4) no changes to your medications today        OBJECTIVE:  Current Weight: Weight - Scale: 101 kg (222 lb 9 6 oz)  Vitals:    10/12/18 0857   BP: 140/72   BP Location: Left arm   Patient Position: Sitting   Cuff Size: Standard   Pulse: 68   Weight: 101 kg (222 lb 9 6 oz)   Height: 5' 8" (1 727 m)    Body mass index is 33 85 kg/m²  REVIEW OF SYSTEMS:    Review of Systems   Constitutional: Negative  Negative for fatigue  HENT: Negative  Eyes: Negative  Respiratory: Negative  Negative for shortness of breath  Cardiovascular: Negative  Negative for leg swelling  Gastrointestinal: Negative  Endocrine: Negative  Genitourinary: Negative  Negative for difficulty urinating  Musculoskeletal: Negative  Skin: Negative  Allergic/Immunologic: Negative  Neurological: Negative  Hematological: Negative  Psychiatric/Behavioral: Negative  All other systems reviewed and are negative  PHYSICAL EXAM:      Physical Exam   Constitutional: She is oriented to person, place, and time  She appears well-developed and well-nourished  No distress  HENT:   Head: Normocephalic and atraumatic  Mouth/Throat: No oropharyngeal exudate  Eyes: Conjunctivae are normal  Right eye exhibits no discharge  Left eye exhibits no discharge  No scleral icterus  Neck: Normal range of motion  Neck supple  No JVD present  Cardiovascular: Normal rate and regular rhythm  Exam reveals no gallop and no friction rub  No murmur heard  Pulmonary/Chest: Effort normal and breath sounds normal  No respiratory distress  She has no wheezes  She has no rales  Abdominal: Soft  Bowel sounds are normal  She exhibits no distension  There is no tenderness  There is no rebound  Musculoskeletal: Normal range of motion  She exhibits no edema, tenderness or deformity  Neurological: She is alert and oriented to person, place, and time  Coordination normal    Skin: Skin is warm and dry  No rash noted  She is not diaphoretic  No erythema  No pallor  Psychiatric: She has a normal mood and affect  Her behavior is normal  Judgment and thought content normal    Nursing note and vitals reviewed        Medications:    Current Outpatient Prescriptions:     amLODIPine (NORVASC) 10 mg tablet, TAKE 1 TABLET BY MOUTH EVERY MORNING AND 1/2 TABLET EVERY EVENING, Disp: 135 tablet, Rfl: 3    ARIPiprazole (ABILIFY) 30 mg tablet, Take 1 tablet (30 mg total) by mouth daily at bedtime for 90 days, Disp: 90 tablet, Rfl: 0    aspirin 81 MG tablet, Take 1 tablet by mouth daily, Disp: , Rfl:     busPIRone (BUSPAR) 5 mg tablet, Take 1 tablet (5 mg total) by mouth 2 (two) times a day for 90 days, Disp: 180 tablet, Rfl: 0    Cholecalciferol (VITAMIN D3) 1000 units CAPS, Take by mouth, Disp: , Rfl:     cloNIDine (CATAPRES) 0 1 mg tablet, Take 0 3 mg by mouth every 8 (eight) hours  , Disp: , Rfl:     doxazosin (CARDURA) 1 mg tablet, Take 3 tablets (3 mg total) by mouth daily at bedtime, Disp: 90 tablet, Rfl: 3    DULoxetine (CYMBALTA) 60 mg delayed release capsule, Take 1 capsule (60 mg total) by mouth 2 (two) times a day for 90 days, Disp: 180 capsule, Rfl: 0    folic acid (FOLVITE) 1 mg tablet, Take 1 tablet by mouth daily, Disp: , Rfl:     furosemide (LASIX) 20 mg tablet, Take 1 tablet (20 mg total) by mouth daily, Disp: 90 tablet, Rfl: 4    1 [facility] 100 units/mL injection pen, INJCT INSULIN PER SLIDING SCALE, IF BLOOD SUGAR 150-200=2UNITS, 201-250=4UNITS, 251-300=6UNITS, 301-350=8UNITS>350=10UNITS, Disp: 45 mL, Rfl: 2    hydrALAZINE (APRESOLINE) 50 mg tablet, Take 1 tablet (50 mg total) by mouth 3 (three) times a day for 90 days, Disp: 270 tablet, Rfl: 3    Insulin Glargine (TOUJEO SOLOSTAR) 300 units/mL CONCETRATED U-300 injection pen, Inject 4 Units under the skin daily at bedtime, Disp: 4 pen, Rfl: 0    Insulin Pen Needle (BD PEN NEEDLE VISHNU U/F) 32G X 4 MM MISC, Use 4 times daily, Disp: 400 each, Rfl: 1    Lancets (ONETOUCH ULTRASOFT) lancets, by Does not apply route 4 (four) times a day  , Disp: , Rfl:     levothyroxine 50 mcg tablet, TAKE 1 TABLET BY MOUTH ONLY ON SUNDAYS, TAKE WITH 112 MCG DOSE, Disp: 12 tablet, Rfl: 0    LORazepam (ATIVAN) 2 mg tablet, Take 1 tablet (2 mg total) by mouth 3 (three) times a day as needed for anxiety for up to 180 days, Disp: 270 tablet, Rfl: 1    metoprolol tartrate (LOPRESSOR) 50 mg tablet, TAKE 1 TABLET(50MG TOTAL) BY MOUTH TWICE DAILY, Disp: 180 tablet, Rfl: 5    ONE TOUCH ULTRA TEST test strip, Use 4 times daily ( please dispense One touch Ultra test strips), Disp: 400 each, Rfl: 1    pantoprazole (PROTONIX) 40 mg tablet, Take 40 mg by mouth daily, Disp: , Rfl:     pravastatin (PRAVACHOL) 80 mg tablet, TAKE 1 TABLET BY MOUTH DAILY, Disp: 90 tablet, Rfl: 5    pravastatin (PRAVACHOL) 80 mg tablet, TAKE 1 TABLET BY MOUTH EVERY DAY, Disp: 90 tablet, Rfl: 3    predniSONE 5 mg tablet, Take 1 tablet by mouth daily, Disp: , Rfl:     rOPINIRole (REQUIP) 0 25 mg tablet, Take 1 tablet by mouth 2 (two) times a day, Disp: , Rfl:     sodium bicarbonate 650 mg tablet, Take 2 tablets (1,300 mg total) by mouth 3 (three) times a day, Disp: 180 tablet, Rfl: 5    tacrolimus (PROGRAF) 1 mg capsule, Take 3 mg by mouth 2 (two) times a day 3 mg BID , Disp: , Rfl:     Laboratory Results:    Results from last 7 days  Lab Units 10/10/18  0746   WBC Thousand/uL 12 23*   HEMOGLOBIN g/dL 11 4*   HEMATOCRIT % 37 0   PLATELETS Thousands/uL 191   SODIUM mmol/L 134*   POTASSIUM mmol/L 4 5   CHLORIDE mmol/L 104   CO2 mmol/L 25   BUN mg/dL 26*   CREATININE mg/dL 1 95*   CALCIUM mg/dL 9 3   MAGNESIUM mg/dL 2 5   PHOSPHORUS mg/dL 3 6       Results for orders placed or performed in visit on 10/10/18   CBC and differential   Result Value Ref Range    WBC 12 23 (H) 4 31 - 10 16 Thousand/uL    RBC 3 65 (L) 3 81 - 5 12 Million/uL    Hemoglobin 11 4 (L) 11 5 - 15 4 g/dL    Hematocrit 37 0 34 8 - 46 1 %     (H) 82 - 98 fL    MCH 31 2 26 8 - 34 3 pg    MCHC 30 8 (L) 31 4 - 37 4 g/dL    RDW 16 3 (H) 11 6 - 15 1 %    MPV 12 9 (H) 8 9 - 12 7 fL    Platelets 249 414 - 753 Thousands/uL    nRBC 0 /100 WBCs    Neutrophils Relative 78 (H) 43 - 75 %    Immat GRANS % 1 0 - 2 %    Lymphocytes Relative 12 (L) 14 - 44 %    Monocytes Relative 6 4 - 12 %    Eosinophils Relative 2 0 - 6 %    Basophils Relative 1 0 - 1 %    Neutrophils Absolute 9 74 (H) 1 85 - 7 62 Thousands/µL    Immature Grans Absolute 0 08 0 00 - 0 20 Thousand/uL    Lymphocytes Absolute 1 41 0 60 - 4 47 Thousands/µL    Monocytes Absolute 0 70 0 17 - 1 22 Thousand/µL    Eosinophils Absolute 0 22 0 00 - 0 61 Thousand/µL    Basophils Absolute 0 08 0 00 - 0 10 Thousands/µL   Protein electrophoresis, serum   Result Value Ref Range    A/G Ratio 0 60 (L) 1 10 - 1 80    Albumin Electrophoresis 37 6 (L) 52 0 - 65 0 %    Albumin CONC 3 05 (L) 3 50 - 5 00 g/dl    Alpha 1 5 7 (H) 2 5 - 5 0 %    ALPHA 1 CONC 0 46 (H) 0 10 - 0 40 g/dL    Alpha 2 12 4 7 0 - 13 0 %    ALPHA 2 CONC 1 00 0 40 - 1 20 g/dL    Beta-1 5 1 5 0 - 13 0 %    BETA 1 CONC 0 41 0 40 - 0 80 g/dL    Beta-2 7 3 2 0 - 8 0 %    BETA 2 CONC 0 59 (H) 0 20 - 0 50 g/dL    Gamma Globulin 31 9 (H) 12 0 - 22 0 %    GAMMA CONC 2 58 (H) 0 50 - 1 60 g/dL    M Peak ID 1 5 30 %    M PEAK 1 CONC 0 43 g/dL    M Peak ID 2 27 90 %    M Peak 2 CONC 2 26 g/dL SPEP Interpretation       The serum total protein is within normal limits with hypoalbuminemia  The serum protein electrophoresis shows increased alpha-1 and  beta-2 regions  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  Previous immunofixation identified two IgG kappa bands  Repeat immunofixation to be performed  Reviewed by: Irasema Contreras MD ([id number]) **Electronic Signature**    Total Protein 8 1 6 4 - 8 2 g/dL   LD,Blood   Result Value Ref Range     81 - 234 U/L   IgG, IgA, IgM   Result Value Ref Range    IGA 57 0 (L) 70 0 - 400 0 mg/dL    IGG 2,440 0 (H) 700 0-1,600 0 mg/dL    IGM 31 0 (L) 40 0 - 230 0 mg/dL   Immunoglobulin free LT chains blood   Result Value Ref Range    Ig Kappa Free Light Chain 56 8 (H) 3 3 - 19 4 mg/L    Ig Lambda Free Light Chain 18 4 5 7 - 26 3 mg/L    Kappa/Lambda FluidC Ratio 3 09 (H) 0 26 - 1 65   Immunofixation, Serum(Reflex Only-Do Not Order)   Result Value Ref Range    Immunofixation Interpretation       Serum immunofixation shows a biclonal gammopathy identified as IgG kappa (M-Peak 1 = 0 43 g/dL and M-Peak 2 = 2 26 g/dL)  Reviewed by: Irasema Contreras MD  ([id number])  **Electronic Signature**

## 2018-10-15 ENCOUNTER — DOCUMENTATION (OUTPATIENT)
Dept: PSYCHIATRY | Facility: CLINIC | Age: 53
End: 2018-10-15

## 2018-10-15 NOTE — PROGRESS NOTES
Treatment Plan not completed within required time limits due to: cancelled appointment  on 10/15/2018

## 2018-10-18 ENCOUNTER — TELEPHONE (OUTPATIENT)
Dept: ENDOCRINOLOGY | Facility: CLINIC | Age: 53
End: 2018-10-18

## 2018-10-18 DIAGNOSIS — E11.65 UNCONTROLLED TYPE 2 DIABETES MELLITUS WITH HYPERGLYCEMIA (HCC): Primary | ICD-10-CM

## 2018-10-22 DIAGNOSIS — F41.1 GAD (GENERALIZED ANXIETY DISORDER): ICD-10-CM

## 2018-10-22 RX ORDER — BUSPIRONE HYDROCHLORIDE 5 MG/1
TABLET ORAL
Qty: 180 TABLET | Refills: 0 | OUTPATIENT
Start: 2018-10-22

## 2018-10-23 ENCOUNTER — TELEPHONE (OUTPATIENT)
Dept: HEMATOLOGY ONCOLOGY | Facility: CLINIC | Age: 53
End: 2018-10-23

## 2018-10-24 DIAGNOSIS — F41.1 GAD (GENERALIZED ANXIETY DISORDER): Primary | ICD-10-CM

## 2018-10-25 RX ORDER — BUSPIRONE HYDROCHLORIDE 5 MG/1
5 TABLET ORAL 2 TIMES DAILY
Qty: 180 TABLET | Refills: 0 | Status: SHIPPED | OUTPATIENT
Start: 2018-10-25 | End: 2019-01-14 | Stop reason: SDUPTHER

## 2018-10-28 DIAGNOSIS — F33.1 MAJOR DEPRESSIVE DISORDER, RECURRENT EPISODE, MODERATE (HCC): Primary | Chronic | ICD-10-CM

## 2018-10-28 DIAGNOSIS — F41.1 GAD (GENERALIZED ANXIETY DISORDER): ICD-10-CM

## 2018-10-29 ENCOUNTER — TELEPHONE (OUTPATIENT)
Dept: NEPHROLOGY | Facility: CLINIC | Age: 53
End: 2018-10-29

## 2018-10-29 PROBLEM — I77.1 SUBCLAVIAN ARTERY STENOSIS, LEFT (HCC): Status: ACTIVE | Noted: 2018-10-29

## 2018-10-29 PROBLEM — Z87.440 HISTORY OF RECURRENT UTIS: Status: ACTIVE | Noted: 2018-10-29

## 2018-10-29 RX ORDER — SERTRALINE HYDROCHLORIDE 100 MG/1
TABLET, FILM COATED ORAL
Qty: 180 TABLET | Refills: 0 | Status: SHIPPED | OUTPATIENT
Start: 2018-10-29 | End: 2019-01-14 | Stop reason: SDUPTHER

## 2018-10-29 NOTE — TELEPHONE ENCOUNTER
Blood pressure log reviewed from 10/12 to 10/25  Blood pressures range anywhere from 130-1 80, but are now mainly in the 860L and 976 systolic  Diastolics ranging anywhere from 40-60  Given the widened pulse pressure, no changes for now  Patient is taking these blood pressures on the left arm and has a left subclavian stenosis  Therefore I advised the patient to take on the right arm and send the repeat log in 2 weeks  Patient agrees to the plan

## 2018-10-31 ENCOUNTER — TELEPHONE (OUTPATIENT)
Dept: ENDOCRINOLOGY | Facility: CLINIC | Age: 53
End: 2018-10-31

## 2018-10-31 NOTE — TELEPHONE ENCOUNTER
Log dated 10/31/2018 scanned and attached to this encounter  Current diabetic medications patient is taking: Toujeo 3 units @ HS  Humalog -sliding scale    Please review and recommend

## 2018-11-19 ENCOUNTER — LAB (OUTPATIENT)
Dept: LAB | Age: 53
End: 2018-11-19
Payer: MEDICARE

## 2018-11-19 ENCOUNTER — TELEPHONE (OUTPATIENT)
Dept: ENDOCRINOLOGY | Facility: CLINIC | Age: 53
End: 2018-11-19

## 2018-11-19 ENCOUNTER — TRANSCRIBE ORDERS (OUTPATIENT)
Dept: ADMINISTRATIVE | Age: 53
End: 2018-11-19

## 2018-11-19 DIAGNOSIS — Z94.0 KIDNEY REPLACED BY TRANSPLANT: ICD-10-CM

## 2018-11-19 DIAGNOSIS — E11.65 UNCONTROLLED TYPE 2 DIABETES MELLITUS WITH HYPERGLYCEMIA (HCC): ICD-10-CM

## 2018-11-19 DIAGNOSIS — Z94.0 KIDNEY REPLACED BY TRANSPLANT: Primary | ICD-10-CM

## 2018-11-19 PROCEDURE — 87186 SC STD MICRODIL/AGAR DIL: CPT

## 2018-11-19 PROCEDURE — 87086 URINE CULTURE/COLONY COUNT: CPT

## 2018-11-19 PROCEDURE — 87077 CULTURE AEROBIC IDENTIFY: CPT

## 2018-11-19 NOTE — TELEPHONE ENCOUNTER
Log dated 11/19/18 scanned and attached to this encounter  Current diabetic medications patient is taking: Toujeo 3 units @ HS  Humalog - sliding scale    Please review and recommend

## 2018-11-19 NOTE — TELEPHONE ENCOUNTER
Current diabetic medications patient is taking:     Toujeo 3 units @ HS  Humalog - sliding scale    Fasting blood sugars 119-155  During the day blood sugars are in  range  Please inform patient to increase toujeo to 4 units , continue humalog scale     Sierra Melo MD

## 2018-11-20 ENCOUNTER — TELEPHONE (OUTPATIENT)
Dept: NEPHROLOGY | Facility: CLINIC | Age: 53
End: 2018-11-20

## 2018-11-20 DIAGNOSIS — Z94.0 KIDNEY TRANSPLANTED: Primary | ICD-10-CM

## 2018-11-20 NOTE — PROGRESS NOTES
Hello    Patient normally is followed up by Ms Wolf Doyle saw your note that you had spoken with the patient today  We able to find out if the patient had any urinary complaints?  -if no urinary complaints then we will just monitor this urine culture for now  No changes      Thank you    np

## 2018-11-20 NOTE — TELEPHONE ENCOUNTER
----- Message from Myranda Melo MD sent at 11/19/2018  5:15 PM EST -----  Hello    Patient normally is followed up by Ms Shade Nevarez  Can the patient be notified that the renal function is stable  Bicarbonate level is slightly low  For now monitor  Can we confirmed that the patient is taking sodium bicarbonate tablets 2 tablets 3 times a day? Can we make sure the patient is on a call back list for appointment in January  Can the patient repeat blood work in 1 month that she is doing  Can we confirm that the patient does not have any urinary complaints      Thank you    np

## 2018-11-20 NOTE — TELEPHONE ENCOUNTER
Current diabetic medications patient is taking:     Toujeo 4 units @ HS  Humalog  - sliding scale    Blood sugars relatively well controlled  Hypoglycemia improved since toujeo was adjusted   Please inform to continue current regimen    Heather Shelton MD 2 weeks/week(s)

## 2018-11-20 NOTE — TELEPHONE ENCOUNTER
Spoke with the patient and she is aware of her lab test results and she is taking sodium bicarbonate 2 tablets 3 times a day  A follow up appointment has been made for January and she is aware to repeat labs in 1 month which the lab slip has been mailed out

## 2018-11-21 LAB — BACTERIA UR CULT: ABNORMAL

## 2018-11-26 ENCOUNTER — OFFICE VISIT (OUTPATIENT)
Dept: FAMILY MEDICINE CLINIC | Facility: CLINIC | Age: 53
End: 2018-11-26
Payer: MEDICARE

## 2018-11-26 VITALS
RESPIRATION RATE: 20 BRPM | DIASTOLIC BLOOD PRESSURE: 64 MMHG | WEIGHT: 220 LBS | OXYGEN SATURATION: 97 % | HEIGHT: 68 IN | HEART RATE: 76 BPM | BODY MASS INDEX: 33.34 KG/M2 | SYSTOLIC BLOOD PRESSURE: 146 MMHG | TEMPERATURE: 97.8 F

## 2018-11-26 DIAGNOSIS — R32 URINARY INCONTINENCE, UNSPECIFIED TYPE: ICD-10-CM

## 2018-11-26 DIAGNOSIS — Z87.440 HISTORY OF RECURRENT UTIS: ICD-10-CM

## 2018-11-26 DIAGNOSIS — G25.81 RLS (RESTLESS LEGS SYNDROME): Primary | ICD-10-CM

## 2018-11-26 DIAGNOSIS — E03.9 ACQUIRED HYPOTHYROIDISM: ICD-10-CM

## 2018-11-26 DIAGNOSIS — N18.4 CHRONIC KIDNEY DISEASE, STAGE 4 (SEVERE) (HCC): ICD-10-CM

## 2018-11-26 DIAGNOSIS — I10 ESSENTIAL HYPERTENSION: Primary | ICD-10-CM

## 2018-11-26 DIAGNOSIS — E78.2 MIXED HYPERLIPIDEMIA: ICD-10-CM

## 2018-11-26 DIAGNOSIS — E10.42 CONTROLLED TYPE 1 DIABETES MELLITUS WITH DIABETIC POLYNEUROPATHY (HCC): ICD-10-CM

## 2018-11-26 DIAGNOSIS — I35.1 NONRHEUMATIC AORTIC VALVE INSUFFICIENCY: ICD-10-CM

## 2018-11-26 DIAGNOSIS — N18.4 BENIGN HYPERTENSION WITH CKD (CHRONIC KIDNEY DISEASE) STAGE IV (HCC): ICD-10-CM

## 2018-11-26 DIAGNOSIS — I12.9 BENIGN HYPERTENSION WITH CKD (CHRONIC KIDNEY DISEASE) STAGE IV (HCC): ICD-10-CM

## 2018-11-26 LAB
BACTERIA UR QL AUTO: ABNORMAL /HPF
BILIRUB UR QL STRIP: NEGATIVE
CLARITY UR: ABNORMAL
COLOR UR: YELLOW
GLUCOSE UR STRIP-MCNC: NEGATIVE MG/DL
HGB UR QL STRIP.AUTO: NEGATIVE
HYALINE CASTS #/AREA URNS LPF: ABNORMAL /LPF
KETONES UR STRIP-MCNC: NEGATIVE MG/DL
LEUKOCYTE ESTERASE UR QL STRIP: ABNORMAL
NITRITE UR QL STRIP: POSITIVE
NON-SQ EPI CELLS URNS QL MICRO: ABNORMAL /HPF
PH UR STRIP.AUTO: 7.5 [PH] (ref 4.5–8)
PROT UR STRIP-MCNC: ABNORMAL MG/DL
RBC #/AREA URNS AUTO: ABNORMAL /HPF
SL AMB  POCT GLUCOSE, UA: ABNORMAL
SL AMB LEUKOCYTE ESTERASE,UA: ABNORMAL
SL AMB POCT BILIRUBIN,UA: ABNORMAL
SL AMB POCT BLOOD,UA: ABNORMAL
SL AMB POCT CLARITY,UA: ABNORMAL
SL AMB POCT COLOR,UA: YELLOW
SL AMB POCT KETONES,UA: ABNORMAL
SL AMB POCT NITRITE,UA: ABNORMAL
SL AMB POCT PH,UA: 7.5
SL AMB POCT SPECIFIC GRAVITY,UA: 1.01
SL AMB POCT URINE PROTEIN: ABNORMAL
SL AMB POCT UROBILINOGEN: ABNORMAL
SP GR UR STRIP.AUTO: 1.02 (ref 1–1.03)
UROBILINOGEN UR QL STRIP.AUTO: 0.2 E.U./DL
WBC #/AREA URNS AUTO: ABNORMAL /HPF

## 2018-11-26 PROCEDURE — 87186 SC STD MICRODIL/AGAR DIL: CPT | Performed by: FAMILY MEDICINE

## 2018-11-26 PROCEDURE — 81002 URINALYSIS NONAUTO W/O SCOPE: CPT | Performed by: FAMILY MEDICINE

## 2018-11-26 PROCEDURE — 81001 URINALYSIS AUTO W/SCOPE: CPT | Performed by: FAMILY MEDICINE

## 2018-11-26 PROCEDURE — 99214 OFFICE O/P EST MOD 30 MIN: CPT | Performed by: FAMILY MEDICINE

## 2018-11-26 PROCEDURE — 87086 URINE CULTURE/COLONY COUNT: CPT | Performed by: FAMILY MEDICINE

## 2018-11-26 RX ORDER — ROPINIROLE 0.25 MG/1
TABLET, FILM COATED ORAL
Qty: 180 TABLET | Refills: 0 | Status: SHIPPED | OUTPATIENT
Start: 2018-11-26 | End: 2019-02-21 | Stop reason: SDUPTHER

## 2018-11-26 RX ORDER — FOLIC ACID 1 MG/1
TABLET ORAL
Qty: 90 TABLET | Refills: 0 | Status: SHIPPED | OUTPATIENT
Start: 2018-11-26 | End: 2019-02-22 | Stop reason: SDUPTHER

## 2018-11-26 NOTE — PROGRESS NOTES
Chief Complaint   Patient presents with    Follow-up     Routine     Health Maintenance   Topic Date Due    DTaP,Tdap,and Td Vaccines (1 - Tdap) 03/08/1985    PAP SMEAR  03/08/1985    URINE MICROALBUMIN  01/11/2017    DM Eye Exam  12/15/2017    Diabetic Foot Exam  06/22/2018    Pneumococcal PPSV23 Highest Risk Adult (3 of 3 - PCV13) 10/06/2018    HEMOGLOBIN A1C  02/28/2019    DXA SCAN  10/17/2019    MAMMOGRAM  06/28/2020    CRC Screening: Colonoscopy  08/07/2023    Hepatitis C Screening  Completed    INFLUENZA VACCINE  Completed     Assessment/Plan:    Essential hypertension  Blood pressure remains stable  Continue current medical regimen  Follow-up  with cardiologist Dr Yvonne Wilson  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 ([facility] [address] )  Lab Results   Component Value Date    HGBA1C 5 0 08/28/2018       No results for input(s): POCGLU in the last 72 hours  Blood Sugar Average: Last 72 hrs:    Patient reports no hypoglycemic episodes,  Continue Toujeo 4 un  at bedtime and Humalog before meals based on Insulin sliding scalre  Follow up with endocrinology  Check eye exam by Dr Yanet Mcneal  Continue routine foot care by podiatrist Dr Vincenzo Muñiz  Acquired hypothyroidism  Continue replacement with Levothyroxine  Check TSH level  Chronic kidney disease, stage 4 (severe) (HCC)  Management per nephrology Dr Danelle Barnett  Recommended to stay well hydrated  Avoid NSAID's  Hyperlipidemia  Continue Pravastatin 80 mg daily  Check lipid panel  History of recurrent UTIs  Patient complains of urinary frequency, some urinary incontinence for the last 4- 5 days  Will check urinalysis and urine culture  Indolent multiple myeloma ([facility] [address] )  Patient is on watchful observation  Follow-up with hematology-oncology Dr Leopoldo Prose in 2 weeks  Schedule follow-up office visit in 6 months        Diagnoses and all orders for this visit:    Essential hypertension    Controlled type 1 diabetes mellitus with diabetic polyneuropathy (HCC)    Acquired hypothyroidism  -     TSH, 3rd generation with Free T4 reflex; Future    Benign hypertension with CKD (chronic kidney disease) stage IV (HCC)    Nonrheumatic aortic valve insufficiency    Chronic kidney disease, stage 4 (severe) (HCC)    Mixed hyperlipidemia  -     Lipid panel; Future    History of recurrent UTIs  -     POCT urine dip  -     Urine culture  -     UA (URINE) with reflex to Microscopic  -     Urine Microscopic    Urinary incontinence, unspecified type  -     Urine culture          Subjective:      Patient ID: Angeles Garcia is a 46 y o  female  HPI     Patient presents for follow-up of chronic medical conditions accompanied by her mother  PMHx: HTN, Hyperlipidemia,  AI, CKD stage 4,   H/o kidney and pancreatic transplant in 1998, Hypothyroidism, Type 1 DM, diabetic neuropathy,  Anemia, Depression, Anxiety, indolent multiple myeloma  Reviewed current medications, blood work results 11/19/18  Creatinine 1 97, potassium 4 0, GFR 28  Blood test done in August 2018 showed TSH 2 960,   Vit D 25 hydroxy 45 2, Hb A1C 5 0  HTN - BP is stable  Blood pressure at home ranges 140- 150/ 50-60  Patient denies  CP, heart palpitations, dizziness  She has stable exertional SOB  Type 1 DM/ Failed pancreatic transplant in 2017  Patient has been followed by endocrinology,  was seen by Dr Janelle Vera in 5/18  Currently on Toujeo 4 un  at bedtime and Humalog before meals based on Insulin sliding scale  Denies hypoglycemic episodes  Patient has been followed by ophthalmologist Dr Karyn Pisano and and podiatrist Dr Danella Goltz  CKD stage 4 - patient has been followed by nephrologist Dr Juen Rodas, was  seen in October 2018  Hypothyroidism - currently taking Levothyroxine 112mcg daily  Patient had echocardiogram done in July 2018: EF 60 %, grade 1 diastolic dysfunction, moderate AR  She has been followed by cardiologist Dr Jose Jeffery      Patient has IgG kappa smoldering myeloma  She was evaluated at [facility] by   Dr Silvia Murrieta who recommended to continue watchful observation  Patient also followed by hematology- oncology   Dr Fran Soto  Next appointment scheduled in 2 weeks  DEXA scan done in 10 /17 showed osteopenia  Patient had mammogram in 6/18  Colonoscopy done in April 2013  Patient had Pneumovax in October 2017,  Flu shot in 9/18  Patient has history of recurrent UTI's  She c/o urinary frequency, some urinary incontinence for the last 4- 5 days  Denies burning with urination  No fever or chills  Depression / Anxiety - mood has been stable  Management per psychiatrist Dr Nagi Albarran  The following portions of the patient's history were reviewed and updated as appropriate: allergies, past family history, past medical history, past social history, past surgical history and problem list     Review of Systems   Constitutional: Positive for fatigue (mild)  Negative for activity change, appetite change, chills and fever  HENT: Negative for congestion, ear pain, hearing loss, nosebleeds, sore throat, tinnitus and trouble swallowing  Eyes: Negative for pain, discharge, redness, itching and visual disturbance  Respiratory: Positive for shortness of breath (stable exertional SOB)  Negative for cough, chest tightness and wheezing  Cardiovascular: Negative for chest pain, palpitations and leg swelling  Gastrointestinal: Positive for constipation  Negative for abdominal pain, blood in stool, nausea and vomiting  Genitourinary: Positive for frequency  Negative for difficulty urinating, dysuria, flank pain, hematuria and pelvic pain  Urinary incontinence   Musculoskeletal: Positive for gait problem (gait instability  Ambulates with a cane)  Negative for back pain, joint swelling and myalgias  Skin: Negative for rash and wound  Neurological: Negative for dizziness, syncope and headaches     Hematological: Negative for adenopathy  Bruises/bleeds easily  Psychiatric/Behavioral: Positive for sleep disturbance  Negative for suicidal ideas  Anxiety / Depression - mood has been stable  Objective:      /64 (BP Location: Left arm, Patient Position: Sitting, Cuff Size: Adult)   Pulse 76   Temp 97 8 °F (36 6 °C) (Oral)   Resp 20   Ht 5' 8" (1 727 m)   Wt 99 8 kg (220 lb)   LMP  (LMP Unknown)   SpO2 97%   BMI 33 45 kg/m²        Physical Exam   Constitutional: She appears well-developed and well-nourished  Obese   HENT:   Head: Normocephalic and atraumatic  Right Ear: External ear normal    Left Ear: External ear normal    Mouth/Throat: Oropharynx is clear and moist    Eyes: Pupils are equal, round, and reactive to light  Conjunctivae are normal    Neck: Normal range of motion  Neck supple  No JVD present  Cardiovascular: Normal rate and regular rhythm  Murmur (systolic murmur at R US and L USB) heard  No BL LE edema   Pulmonary/Chest: Effort normal and breath sounds normal  She has no wheezes  She has no rales  Abdominal: Soft  Bowel sounds are normal  There is no rebound  Musculoskeletal:   Patient ambulates with a cane  R foot: amputated 1 st toe  L foot: amputated all 5 toes  Skin: Skin is warm and dry  No rash noted  Psychiatric: She has a normal mood and affect  Her behavior is normal    Nursing note and vitals reviewed

## 2018-11-27 ENCOUNTER — TELEPHONE (OUTPATIENT)
Dept: FAMILY MEDICINE CLINIC | Facility: CLINIC | Age: 53
End: 2018-11-27

## 2018-11-27 NOTE — ASSESSMENT & PLAN NOTE
Patient complains of urinary frequency, some urinary incontinence for the last 4- 5 days  Will check urinalysis and urine culture

## 2018-11-27 NOTE — ASSESSMENT & PLAN NOTE
Blood pressure remains stable  Continue current medical regimen  Follow-up  with cardiologist Dr Katlyn Guerra

## 2018-11-27 NOTE — ASSESSMENT & PLAN NOTE
Lab Results   Component Value Date    HGBA1C 5 0 08/28/2018       No results for input(s): POCGLU in the last 72 hours  Blood Sugar Average: Last 72 hrs:    Patient reports no hypoglycemic episodes,  Continue Toujeo 4 un  at bedtime and Humalog before meals based on Insulin sliding scalre  Follow up with endocrinology  Check eye exam by Dr Yoandy Gomez  Continue routine foot care by podiatrist Dr Yoly Sevilla

## 2018-11-27 NOTE — TELEPHONE ENCOUNTER
----- Message from Sruthi Chan MD sent at 11/26/2018 10:52 PM EST -----  Please check with patient her current dose of Levothyroxine

## 2018-11-28 DIAGNOSIS — N39.0 RECURRENT UTI: Primary | ICD-10-CM

## 2018-11-28 LAB — BACTERIA UR CULT: ABNORMAL

## 2018-11-28 RX ORDER — CEPHALEXIN 500 MG/1
500 CAPSULE ORAL EVERY 8 HOURS SCHEDULED
Qty: 21 CAPSULE | Refills: 0 | Status: SHIPPED | OUTPATIENT
Start: 2018-11-28 | End: 2018-12-05

## 2018-11-30 ENCOUNTER — TELEPHONE (OUTPATIENT)
Dept: ENDOCRINOLOGY | Facility: CLINIC | Age: 53
End: 2018-11-30

## 2018-11-30 NOTE — TELEPHONE ENCOUNTER
Log dated 11/30/2018 scanned and attached to this encounter  Current diabetic medications patient is taking: Toujeo 3-4 units @ HS  Humalog: sliding scale before meals    Please review and recommend

## 2018-11-30 NOTE — TELEPHONE ENCOUNTER
Please have patient continue current regimen  Advise her to keep carbs consistent with meals  If blood glucose is less than 70 or over 300, she should call       Nya Lutz PA-C

## 2018-12-09 DIAGNOSIS — F33.1 MAJOR DEPRESSIVE DISORDER, RECURRENT EPISODE, MODERATE (HCC): Primary | Chronic | ICD-10-CM

## 2018-12-10 RX ORDER — ARIPIPRAZOLE 30 MG/1
TABLET ORAL
Qty: 90 TABLET | Refills: 0 | Status: SHIPPED | OUTPATIENT
Start: 2018-12-10 | End: 2019-01-14 | Stop reason: SDUPTHER

## 2018-12-13 LAB
LEFT EYE DIABETIC RETINOPATHY: NORMAL
RIGHT EYE DIABETIC RETINOPATHY: NORMAL

## 2018-12-14 ENCOUNTER — TELEPHONE (OUTPATIENT)
Dept: ENDOCRINOLOGY | Facility: CLINIC | Age: 53
End: 2018-12-14

## 2018-12-15 NOTE — TELEPHONE ENCOUNTER
Blood sugars in ok range, continue current treatment  Try to keep carb intake consistent to avoid fluctuations in blood glucose       Nya Lutz PA-C

## 2018-12-18 DIAGNOSIS — F33.1 MAJOR DEPRESSIVE DISORDER, RECURRENT EPISODE, MODERATE (HCC): Primary | Chronic | ICD-10-CM

## 2018-12-18 DIAGNOSIS — F41.1 GAD (GENERALIZED ANXIETY DISORDER): ICD-10-CM

## 2018-12-18 RX ORDER — DULOXETIN HYDROCHLORIDE 60 MG/1
CAPSULE, DELAYED RELEASE ORAL
Qty: 180 CAPSULE | Refills: 0 | Status: SHIPPED | OUTPATIENT
Start: 2018-12-18 | End: 2019-01-14 | Stop reason: SDUPTHER

## 2018-12-20 ENCOUNTER — DOCUMENTATION (OUTPATIENT)
Dept: PSYCHIATRY | Facility: CLINIC | Age: 53
End: 2018-12-20

## 2018-12-20 NOTE — PROGRESS NOTES
# 1Treatment Plan not completed within required time limits due to : Provider will be out of the office 3/21/18 and will reschedule at a later time

## 2018-12-26 ENCOUNTER — DOCUMENTATION (OUTPATIENT)
Dept: PSYCHIATRY | Facility: CLINIC | Age: 53
End: 2018-12-26

## 2018-12-26 DIAGNOSIS — E11.65 UNCONTROLLED TYPE 2 DIABETES MELLITUS WITH HYPERGLYCEMIA (HCC): ICD-10-CM

## 2018-12-26 NOTE — TELEPHONE ENCOUNTER
Blood glucose dropped to 81 and 59 during the daytime, without using sliding scale with meal    Decrease Toujeo to 3 units daily and continue scale with meals  Call if blood sugars persistently over 300 or less than 70 mg/dl      Nya Lutz PA-C

## 2018-12-26 NOTE — PROGRESS NOTES
Treatment Plan not completed within required time limits due to: cancelled appointment  on 12/26/2018

## 2018-12-27 ENCOUNTER — TELEPHONE (OUTPATIENT)
Dept: PSYCHIATRY | Facility: CLINIC | Age: 53
End: 2018-12-27

## 2018-12-27 ENCOUNTER — TELEPHONE (OUTPATIENT)
Dept: FAMILY MEDICINE CLINIC | Facility: CLINIC | Age: 53
End: 2018-12-27

## 2018-12-27 ENCOUNTER — OFFICE VISIT (OUTPATIENT)
Dept: FAMILY MEDICINE CLINIC | Facility: CLINIC | Age: 53
End: 2018-12-27
Payer: MEDICARE

## 2018-12-27 VITALS
BODY MASS INDEX: 32.74 KG/M2 | WEIGHT: 216 LBS | HEIGHT: 68 IN | HEART RATE: 80 BPM | DIASTOLIC BLOOD PRESSURE: 68 MMHG | RESPIRATION RATE: 20 BRPM | TEMPERATURE: 97.7 F | SYSTOLIC BLOOD PRESSURE: 140 MMHG

## 2018-12-27 DIAGNOSIS — B02.9 HERPES ZOSTER WITHOUT COMPLICATION: Primary | ICD-10-CM

## 2018-12-27 DIAGNOSIS — F41.1 GAD (GENERALIZED ANXIETY DISORDER): Primary | Chronic | ICD-10-CM

## 2018-12-27 DIAGNOSIS — N18.4 CHRONIC KIDNEY DISEASE, STAGE 4 (SEVERE) (HCC): ICD-10-CM

## 2018-12-27 PROCEDURE — 99213 OFFICE O/P EST LOW 20 MIN: CPT | Performed by: FAMILY MEDICINE

## 2018-12-27 RX ORDER — VALACYCLOVIR HYDROCHLORIDE 500 MG/1
500 TABLET, FILM COATED ORAL 2 TIMES DAILY
Qty: 14 TABLET | Refills: 0 | Status: SHIPPED | OUTPATIENT
Start: 2018-12-27 | End: 2019-01-14 | Stop reason: ALTCHOICE

## 2018-12-27 RX ORDER — LORAZEPAM 2 MG/1
2 TABLET ORAL 3 TIMES DAILY PRN
Qty: 90 TABLET | Refills: 2 | Status: SHIPPED | OUTPATIENT
Start: 2018-12-27 | End: 2019-01-14 | Stop reason: SDUPTHER

## 2018-12-27 NOTE — TELEPHONE ENCOUNTER
Patient called for a refill of Lorazepam 2 mg  Alexiaerma Murillo wanted you to know she missed her appointment yesterday due to having shingles-which was contagious  Gave her an appointment for March because of request for medication

## 2018-12-27 NOTE — ASSESSMENT & PLAN NOTE
Will start on Valtrex  500 mg 1 tablet twice daily for 1 week ( renal dose)  Patient was advised to call office if rash persists or spreading

## 2018-12-27 NOTE — PROGRESS NOTES
Chief Complaint   Patient presents with    Herpes Zoster     Possible     Health Maintenance   Topic Date Due    Medicare Annual Wellness Visit (AWV)  1965    DTaP,Tdap,and Td Vaccines (1 - Tdap) 03/08/1985    PAP SMEAR  03/08/1985    URINE MICROALBUMIN  01/11/2017    Diabetic Foot Exam  06/22/2018    Pneumococcal PPSV23 Highest Risk Adult (3 of 3 - PCV13) 10/06/2018    HEMOGLOBIN A1C  02/28/2019    DXA SCAN  10/17/2019    DM Eye Exam  12/13/2019    MAMMOGRAM  06/28/2020    CRC Screening: Colonoscopy  08/07/2023    Hepatitis C Screening  Completed    INFLUENZA VACCINE  Completed     Assessment/Plan:    Herpes zoster without complication  Will start on Valtrex  500 mg 1 tablet twice daily for 1 week ( renal dose)  Patient was advised to call office if rash persists or spreading  Chronic [facility], stage 4 (severe) ([facility]  Management per nephrologist Dr Marrian Severe  Diagnoses and all orders for this visit:    Herpes zoster without complication  -     valACYclovir (VALTREX) 500 mg tablet; Take 1 tablet (500 mg total) by mouth 2 (two) times a day for 7 days    Chronic kidney disease, stage 4 (severe) ([facility])          Subjective:      Patient ID: Kaitlyn Raines is a 46 y o  female  HPI     Patient is 54-year-old female with multiple multiple medical problems including HTN, Hyperlipidemia,  AI, CKD stage 4, H/o kidney and pancreatic transplant in 1998, Hypothyroidism, Type 1 DM, diabetic neuropathy,  Anemia, Depression, Anxiety, indolent multiple myeloma      She presents to the office c/o rash on right upper back, right breast area which she developed 1 week ago  C/o crusty skin lesions, burning sensation, skin itching  She had herpes zoster in August 2018  Patient is on the waiting list for Shingrix vaccination  Reviewed current medications  Patient denies fever, chills  She has been followed by nephrologist Dr Velazquez Severe      C/o abdominal bloating for the last few days, had a bowel movement this morning and feels better now  The following portions of the patient's history were reviewed and updated as appropriate: allergies, current medications, past family history, past medical history, past surgical history and problem list     Review of Systems   Constitutional: Positive for fatigue (mild)  Negative for activity change, appetite change, chills and fever  HENT: Negative for congestion, mouth sores, nosebleeds, sore throat and trouble swallowing  Eyes: Negative for pain, discharge, redness, itching and visual disturbance  Respiratory: Positive for shortness of breath (stable exertional SOB)  Negative for cough, chest tightness and wheezing  Cardiovascular: Negative for chest pain, palpitations and leg swelling  Gastrointestinal: Positive for constipation  Negative for abdominal pain, blood in stool, nausea and vomiting  Abdominal bloating   Genitourinary: Negative for difficulty urinating, dysuria, flank pain, frequency and hematuria  Musculoskeletal: Positive for gait problem (ambulates with a cane)  Negative for arthralgias, back pain and myalgias  Skin:        See HPI   Neurological: Negative for dizziness and headaches  Hematological: Negative for adenopathy  Bruises/bleeds easily  Objective:      /68 (BP Location: Left arm, Patient Position: Sitting, Cuff Size: Large)   Pulse 80   Temp 97 7 °F (36 5 °C) (Tympanic)   Resp 20   Ht 5' 8" (1 727 m)   Wt 98 kg (216 lb)   LMP  (LMP Unknown)   BMI 32 84 kg/m²          Physical Exam   Constitutional: She appears well-developed and well-nourished  HENT:   Head: Normocephalic and atraumatic  Right Ear: External ear normal    Left Ear: External ear normal    Mouth/Throat: Oropharynx is clear and moist    Neck: Normal range of motion  Neck supple  No JVD present  Cardiovascular: Normal rate and regular rhythm  Murmur (at R USB, L USB) heard    No BL LE edema   Pulmonary/Chest: Effort normal and breath sounds normal  She has no wheezes  She has no rales  Abdominal: Soft  Bowel sounds are normal  There is no tenderness  Musculoskeletal: Normal range of motion  She exhibits no edema, tenderness or deformity  Ambulates with a cane   Skin: Skin is warm and dry  Erythematous crusty skin lesions on right upper back, right breast area, few crusty lesions on mid-lower back   Nursing note and vitals reviewed

## 2018-12-27 NOTE — TELEPHONE ENCOUNTER
I spoke with patient  Recommended to come today for evaluation    Appointment is scheduled at 3:45 pm

## 2018-12-27 NOTE — TELEPHONE ENCOUNTER
Ativan renewed for 30 days with 2 RF   She needs to be scheduled as a new patient for 60 minutes as her last appointment was on 1/25/18 - unless you can reschedule her before 1/25/19

## 2018-12-27 NOTE — TELEPHONE ENCOUNTER
Patient has another case of shingles, she is currently contagious as they are open  Patient is requesting a prescription be called in to [facility]  Patient can be contacted at [phone]

## 2018-12-28 NOTE — TELEPHONE ENCOUNTER
Spoke to Derek Jones  Made her aware of refills being sent to pharmacy and she scheduled an appointment for 1/14 at 4:30

## 2019-01-04 DIAGNOSIS — E03.9 ACQUIRED HYPOTHYROIDISM: ICD-10-CM

## 2019-01-04 DIAGNOSIS — I10 BENIGN ESSENTIAL HTN: Primary | ICD-10-CM

## 2019-01-04 DIAGNOSIS — I10 BENIGN ESSENTIAL HTN: ICD-10-CM

## 2019-01-04 RX ORDER — LEVOTHYROXINE SODIUM 0.05 MG/1
TABLET ORAL
Qty: 12 TABLET | Refills: 0 | Status: SHIPPED | OUTPATIENT
Start: 2019-01-04 | End: 2019-02-08 | Stop reason: DRUGHIGH

## 2019-01-04 RX ORDER — CLONIDINE HYDROCHLORIDE 0.1 MG/1
0.3 TABLET ORAL EVERY 8 HOURS SCHEDULED
Qty: 240 TABLET | Refills: 4 | Status: SHIPPED | OUTPATIENT
Start: 2019-01-04 | End: 2019-01-04 | Stop reason: SDUPTHER

## 2019-01-04 NOTE — TELEPHONE ENCOUNTER
Spoke to patient who confirmed she is taking Levo 112 mcg daily and taking Levo 50 mcg on Sundays with the Levo 112 dose  Patient only needs refill on the 50 mcg dose

## 2019-01-07 RX ORDER — CLONIDINE HYDROCHLORIDE 0.1 MG/1
TABLET ORAL
Qty: 720 TABLET | Refills: 4 | Status: SHIPPED | OUTPATIENT
Start: 2019-01-07 | End: 2019-05-24 | Stop reason: SDUPTHER

## 2019-01-09 ENCOUNTER — TELEPHONE (OUTPATIENT)
Dept: FAMILY MEDICINE CLINIC | Facility: CLINIC | Age: 54
End: 2019-01-09
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
## 2019-01-10 ENCOUNTER — TELEPHONE (OUTPATIENT)
Dept: ENDOCRINOLOGY | Facility: CLINIC | Age: 54
End: 2019-01-10

## 2019-01-10 DIAGNOSIS — E11.65 UNCONTROLLED TYPE 2 DIABETES MELLITUS WITH HYPERGLYCEMIA (HCC): ICD-10-CM

## 2019-01-10 NOTE — TELEPHONE ENCOUNTER
Log dated 1/10/2019  scanned and attached to this encounter  Current diabetic medications patient is taking: Toujeo 3 units  @ hs  Humalog - sliding scale    Please review and recommend

## 2019-01-10 NOTE — TELEPHONE ENCOUNTER
Left detailed message; asked for call back to confirm  [facility] updated  Labs already ordered and in system  Patient has f/u appt 2/8/2019

## 2019-01-10 NOTE — TELEPHONE ENCOUNTER
Current diabetic medications patient is taking:     Toujeo 3 units  @ hs  Humalog - sliding scale    Reviewed blood sugars, usually in  range  Patient is having hypoglycemia, some days a week in the morning  Please inform to reduce toujeo to 1 units at bedtime   Continue Humalog sliding scale, also inform to keep carbohydrate consistent with meals  She should  AM , fasting c-peptide , A1c    Basic metabolic profile he, urine microalbumin creatinine ratio  Last seen in May 2018  Please make appt for follow up , thanks     Sierra Melo MD

## 2019-01-11 DIAGNOSIS — E10.65 TYPE 1 DIABETES MELLITUS WITH HYPERGLYCEMIA (HCC): Primary | ICD-10-CM

## 2019-01-14 ENCOUNTER — TELEPHONE (OUTPATIENT)
Dept: HEMATOLOGY ONCOLOGY | Facility: CLINIC | Age: 54
End: 2019-01-14

## 2019-01-14 ENCOUNTER — OFFICE VISIT (OUTPATIENT)
Dept: PSYCHIATRY | Facility: CLINIC | Age: 54
End: 2019-01-14
Payer: MEDICARE

## 2019-01-14 VITALS
HEART RATE: 87 BPM | DIASTOLIC BLOOD PRESSURE: 69 MMHG | BODY MASS INDEX: 32.89 KG/M2 | HEIGHT: 68 IN | WEIGHT: 217 LBS | SYSTOLIC BLOOD PRESSURE: 172 MMHG

## 2019-01-14 DIAGNOSIS — F41.1 GAD (GENERALIZED ANXIETY DISORDER): Chronic | ICD-10-CM

## 2019-01-14 DIAGNOSIS — E10.8 TYPE 1 DIABETES MELLITUS WITH COMPLICATION (HCC): ICD-10-CM

## 2019-01-14 DIAGNOSIS — F33.42 MAJOR DEPRESSIVE DISORDER, RECURRENT EPISODE, IN FULL REMISSION (HCC): Primary | Chronic | ICD-10-CM

## 2019-01-14 DIAGNOSIS — F43.12 POST-TRAUMATIC STRESS DISORDER, CHRONIC: Chronic | ICD-10-CM

## 2019-01-14 PROBLEM — G47.09 OTHER INSOMNIA: Chronic | Status: RESOLVED | Noted: 2017-10-25 | Resolved: 2019-01-14

## 2019-01-14 PROCEDURE — 90833 PSYTX W PT W E/M 30 MIN: CPT | Performed by: PSYCHIATRY & NEUROLOGY

## 2019-01-14 PROCEDURE — 99213 OFFICE O/P EST LOW 20 MIN: CPT | Performed by: PSYCHIATRY & NEUROLOGY

## 2019-01-14 RX ORDER — ARIPIPRAZOLE 30 MG/1
30 TABLET ORAL
Qty: 90 TABLET | Refills: 1 | Status: SHIPPED | OUTPATIENT
Start: 2019-01-14 | End: 2019-06-05 | Stop reason: SDUPTHER

## 2019-01-14 RX ORDER — BUSPIRONE HYDROCHLORIDE 5 MG/1
5 TABLET ORAL 2 TIMES DAILY
Qty: 180 TABLET | Refills: 1 | Status: SHIPPED | OUTPATIENT
Start: 2019-01-14 | End: 2019-06-05 | Stop reason: SDUPTHER

## 2019-01-14 RX ORDER — LORAZEPAM 2 MG/1
2 TABLET ORAL 3 TIMES DAILY PRN
Qty: 90 TABLET | Refills: 2 | Status: SHIPPED | OUTPATIENT
Start: 2019-03-27 | End: 2019-06-05 | Stop reason: SDUPTHER

## 2019-01-14 RX ORDER — DULOXETIN HYDROCHLORIDE 60 MG/1
60 CAPSULE, DELAYED RELEASE ORAL 2 TIMES DAILY
Qty: 180 CAPSULE | Refills: 1 | Status: SHIPPED | OUTPATIENT
Start: 2019-01-14 | End: 2019-06-05 | Stop reason: SDUPTHER

## 2019-01-14 RX ORDER — SERTRALINE HYDROCHLORIDE 100 MG/1
200 TABLET, FILM COATED ORAL DAILY
Qty: 180 TABLET | Refills: 1 | Status: SHIPPED | OUTPATIENT
Start: 2019-01-14 | End: 2019-06-05 | Stop reason: SDUPTHER

## 2019-01-14 NOTE — TELEPHONE ENCOUNTER
Patient needs letter from doctor stating that "she can't use Dexcom for physical reasons (shakiness) and has to test with a glucometer and test strips"  Patient will call back later with where to fax  Also, offered patient appt for training which she refused stating it is her perogative not to use the Dexcom

## 2019-01-14 NOTE — TELEPHONE ENCOUNTER
Patient states that letter needs to be sent to [facility] rep who will contact Medicare  Email sent to [facility] for confirmation as this is all what the patient thinks

## 2019-01-14 NOTE — TELEPHONE ENCOUNTER
Patient called about getting her labs done  I told patient that the labs were in the computer already and all she had to do was go get them drawn, at any [facility]'s lab

## 2019-01-14 NOTE — TELEPHONE ENCOUNTER
Patient called stating she received Dexcom and does not want to use it  Says she did not get any training and feels shaky while one it  She can not puncture herself  Patient wants to go to checking her BS with meter  Patient will call her insurance to find out which strips are covered and we will call those in for her and also a meter if need be  I asked pt is she would like to come in for training for dexcom and pt did not want to

## 2019-01-14 NOTE — PSYCH
TREATMENT PLAN (Medication Management Only)        [facility]    Name/Date of Birth/MRN:  Monika Godoy 46 y o  1965 MRN: [id number]  Date of Treatment Plan: January 14, 2019  Diagnosis/Diagnoses:   1  Major depressive disorder, recurrent episode, in full remission ([facility] )    2  FELIPE (generalized anxiety disorder)    3  Post-traumatic stress disorder, chronic      Strengths/Personal Resources for Self-Care: "able to speak for myself  Self-reliant"  Area/Areas of need (in own words): anxiety symptoms  1  Long Term Goal: improve control of anxiety  Target Date: 3 months - 4/14/2019  Person/Persons responsible for completion of goal: Facundo Cavazos  2  Short Term Objective (s) - How will we reach this goal?:   A  Provider new recommended medication/dosage changes and/or continue medication(s): continue current medications as prescribed (Zoloft, Cymbalta, Abilify, Buspar and Ativan)  B   N/A   C   N/A  Target Date: 3 months - 4/14/2019  Person/Persons Responsible for Completion of Goal: Facundo Cavazos   Progress Towards Goals: progressing  Treatment Modality: medication management every 3 months  Review due 90 to 120 days from date of this plan: 4 months - 5/14/2019  Expected length of service: maintenance  My Physician/PA/NP and I have developed this plan together and I agree to work on the goals and objectives  I understand the treatment goals that were developed for my treatment    Signature:       Date and time:  Signature of parent/guardian if under age of 15 years: Date and time:  Signature of provider:      Date and time:  Signature of Supervising Physician:    Date and time: 1/14/2019      Cody Carter MD

## 2019-01-15 NOTE — TELEPHONE ENCOUNTER
Sara, [employer] rep, emailed form to complete  Asked her to call so we can complete together (I've never completed this form but neither has she)

## 2019-01-15 NOTE — TELEPHONE ENCOUNTER
Spoke to [facility]  Medicare Redetermination Request Form needs to be completed by supplier (aka pharmacy)  Sara took Pharmacy phone number  She will keep us updated with anything she finds out

## 2019-01-15 NOTE — TELEPHONE ENCOUNTER
Spoke to Suzanne Garcia; she and her team are still working on it  She is going to call the patient directly

## 2019-01-18 DIAGNOSIS — E03.9 HYPOTHYROIDISM, UNSPECIFIED TYPE: ICD-10-CM

## 2019-01-18 DIAGNOSIS — E03.9 HYPOTHYROIDISM, UNSPECIFIED TYPE: Primary | ICD-10-CM

## 2019-01-18 RX ORDER — LEVOTHYROXINE SODIUM 112 UG/1
112 TABLET ORAL DAILY
Qty: 30 TABLET | Refills: 2 | Status: SHIPPED | OUTPATIENT
Start: 2019-01-18 | End: 2019-01-18 | Stop reason: SDUPTHER

## 2019-01-18 RX ORDER — LEVOTHYROXINE SODIUM 112 UG/1
TABLET ORAL
Qty: 90 TABLET | Refills: 2 | Status: SHIPPED | OUTPATIENT
Start: 2019-01-18 | End: 2019-02-08 | Stop reason: DRUGHIGH

## 2019-01-21 ENCOUNTER — DOCUMENTATION (OUTPATIENT)
Dept: ENDOCRINOLOGY | Facility: CLINIC | Age: 54
End: 2019-01-21

## 2019-01-24 ENCOUNTER — TRANSCRIBE ORDERS (OUTPATIENT)
Dept: ADMINISTRATIVE | Age: 54
End: 2019-01-24

## 2019-01-24 ENCOUNTER — APPOINTMENT (OUTPATIENT)
Dept: LAB | Age: 54
End: 2019-01-24
Payer: MEDICARE

## 2019-01-24 DIAGNOSIS — Z94.0 TRANSPLANTED KIDNEY: ICD-10-CM

## 2019-01-24 DIAGNOSIS — Z94.0 KIDNEY TRANSPLANTED: ICD-10-CM

## 2019-01-24 DIAGNOSIS — E03.9 ACQUIRED HYPOTHYROIDISM: ICD-10-CM

## 2019-01-24 DIAGNOSIS — E55.9 VITAMIN D DEFICIENCY: ICD-10-CM

## 2019-01-24 DIAGNOSIS — E78.2 MIXED HYPERLIPIDEMIA: ICD-10-CM

## 2019-01-24 DIAGNOSIS — R82.81 PYURIA: ICD-10-CM

## 2019-01-24 DIAGNOSIS — E11.65 UNCONTROLLED TYPE 2 DIABETES MELLITUS WITH HYPERGLYCEMIA (HCC): ICD-10-CM

## 2019-01-24 DIAGNOSIS — C90.00 INDOLENT MULTIPLE MYELOMA (HCC): ICD-10-CM

## 2019-01-24 DIAGNOSIS — I10 ESSENTIAL HYPERTENSION: ICD-10-CM

## 2019-01-24 DIAGNOSIS — E10.8 TYPE 1 DIABETES MELLITUS WITH COMPLICATION (HCC): ICD-10-CM

## 2019-01-24 LAB
BASOPHILS # BLD AUTO: 0.06 THOUSANDS/ΜL (ref 0–0.1)
BASOPHILS NFR BLD AUTO: 1 % (ref 0–1)
CHOLEST SERPL-MCNC: 137 MG/DL (ref 50–200)
CREAT UR-MCNC: 31.8 MG/DL
EOSINOPHIL # BLD AUTO: 0.32 THOUSAND/ΜL (ref 0–0.61)
EOSINOPHIL NFR BLD AUTO: 5 % (ref 0–6)
ERYTHROCYTE [DISTWIDTH] IN BLOOD BY AUTOMATED COUNT: 16.8 % (ref 11.6–15.1)
FERRITIN SERPL-MCNC: 42 NG/ML (ref 8–388)
HCT VFR BLD AUTO: 33.8 % (ref 34.8–46.1)
HDLC SERPL-MCNC: 38 MG/DL (ref 40–60)
HGB BLD-MCNC: 10.2 G/DL (ref 11.5–15.4)
IGA SERPL-MCNC: 66 MG/DL (ref 70–400)
IGG SERPL-MCNC: 2730 MG/DL (ref 700–1600)
IGM SERPL-MCNC: 25 MG/DL (ref 40–230)
IMM GRANULOCYTES # BLD AUTO: 0.06 THOUSAND/UL (ref 0–0.2)
IMM GRANULOCYTES NFR BLD AUTO: 1 % (ref 0–2)
IRON SATN MFR SERPL: 21 %
IRON SERPL-MCNC: 52 UG/DL (ref 50–170)
LDH SERPL-CCNC: 180 U/L (ref 81–234)
LDLC SERPL CALC-MCNC: 68 MG/DL (ref 0–100)
LYMPHOCYTES # BLD AUTO: 1.24 THOUSANDS/ΜL (ref 0.6–4.47)
LYMPHOCYTES NFR BLD AUTO: 18 % (ref 14–44)
MAGNESIUM SERPL-MCNC: 2.2 MG/DL (ref 1.6–2.6)
MCH RBC QN AUTO: 30.6 PG (ref 26.8–34.3)
MCHC RBC AUTO-ENTMCNC: 30.2 G/DL (ref 31.4–37.4)
MCV RBC AUTO: 102 FL (ref 82–98)
MONOCYTES # BLD AUTO: 0.39 THOUSAND/ΜL (ref 0.17–1.22)
MONOCYTES NFR BLD AUTO: 6 % (ref 4–12)
NEUTROPHILS # BLD AUTO: 5 THOUSANDS/ΜL (ref 1.85–7.62)
NEUTS SEG NFR BLD AUTO: 69 % (ref 43–75)
NONHDLC SERPL-MCNC: 99 MG/DL
NRBC BLD AUTO-RTO: 0 /100 WBCS
PHOSPHATE SERPL-MCNC: 3.5 MG/DL (ref 2.7–4.5)
PLATELET # BLD AUTO: 198 THOUSANDS/UL (ref 149–390)
PMV BLD AUTO: 12 FL (ref 8.9–12.7)
PROT UR-MCNC: 68 MG/DL
PROT/CREAT UR: 2.14 MG/G{CREAT} (ref 0–0.1)
PTH-INTACT SERPL-MCNC: 56.5 PG/ML (ref 18.4–80.1)
RBC # BLD AUTO: 3.33 MILLION/UL (ref 3.81–5.12)
T4 FREE SERPL-MCNC: 0.91 NG/DL (ref 0.76–1.46)
TIBC SERPL-MCNC: 249 UG/DL (ref 250–450)
TRIGL SERPL-MCNC: 157 MG/DL
TSH SERPL DL<=0.05 MIU/L-ACNC: 6.42 UIU/ML (ref 0.36–3.74)
WBC # BLD AUTO: 7.07 THOUSAND/UL (ref 4.31–10.16)

## 2019-01-24 PROCEDURE — 84165 PROTEIN E-PHORESIS SERUM: CPT

## 2019-01-24 PROCEDURE — 83883 ASSAY NEPHELOMETRY NOT SPEC: CPT

## 2019-01-24 PROCEDURE — 82784 ASSAY IGA/IGD/IGG/IGM EACH: CPT

## 2019-01-24 PROCEDURE — 87077 CULTURE AEROBIC IDENTIFY: CPT

## 2019-01-24 PROCEDURE — 84439 ASSAY OF FREE THYROXINE: CPT

## 2019-01-24 PROCEDURE — 83540 ASSAY OF IRON: CPT | Performed by: NURSE PRACTITIONER

## 2019-01-24 PROCEDURE — 84443 ASSAY THYROID STIM HORMONE: CPT

## 2019-01-24 PROCEDURE — 83970 ASSAY OF PARATHORMONE: CPT | Performed by: NURSE PRACTITIONER

## 2019-01-24 PROCEDURE — 84100 ASSAY OF PHOSPHORUS: CPT | Performed by: NURSE PRACTITIONER

## 2019-01-24 PROCEDURE — 87186 SC STD MICRODIL/AGAR DIL: CPT

## 2019-01-24 PROCEDURE — 82570 ASSAY OF URINE CREATININE: CPT

## 2019-01-24 PROCEDURE — 84681 ASSAY OF C-PEPTIDE: CPT

## 2019-01-24 PROCEDURE — 83735 ASSAY OF MAGNESIUM: CPT | Performed by: NURSE PRACTITIONER

## 2019-01-24 PROCEDURE — 85025 COMPLETE CBC W/AUTO DIFF WBC: CPT | Performed by: NURSE PRACTITIONER

## 2019-01-24 PROCEDURE — 80061 LIPID PANEL: CPT

## 2019-01-24 PROCEDURE — 83550 IRON BINDING TEST: CPT | Performed by: NURSE PRACTITIONER

## 2019-01-24 PROCEDURE — 83615 LACTATE (LD) (LDH) ENZYME: CPT

## 2019-01-24 PROCEDURE — 82728 ASSAY OF FERRITIN: CPT | Performed by: NURSE PRACTITIONER

## 2019-01-24 PROCEDURE — 84156 ASSAY OF PROTEIN URINE: CPT

## 2019-01-24 PROCEDURE — 84165 PROTEIN E-PHORESIS SERUM: CPT | Performed by: PATHOLOGY

## 2019-01-24 PROCEDURE — 87086 URINE CULTURE/COLONY COUNT: CPT

## 2019-01-25 LAB
C PEPTIDE SERPL-MCNC: 1.8 NG/ML (ref 1.1–4.4)
KAPPA LC FREE SER-MCNC: 59.3 MG/L (ref 3.3–19.4)
KAPPA LC FREE/LAMBDA FREE SER: 2.86 {RATIO} (ref 0.26–1.65)
LAMBDA LC FREE SERPL-MCNC: 20.7 MG/L (ref 5.7–26.3)

## 2019-01-26 LAB — BACTERIA UR CULT: ABNORMAL

## 2019-01-28 ENCOUNTER — TELEPHONE (OUTPATIENT)
Dept: ENDOCRINOLOGY | Facility: CLINIC | Age: 54
End: 2019-01-28

## 2019-01-28 LAB
ALBUMIN SERPL ELPH-MCNC: 3.33 G/DL (ref 3.5–5)
ALBUMIN SERPL ELPH-MCNC: 39.2 % (ref 52–65)
ALPHA1 GLOB SERPL ELPH-MCNC: 0.36 G/DL (ref 0.1–0.4)
ALPHA1 GLOB SERPL ELPH-MCNC: 4.2 % (ref 2.5–5)
ALPHA2 GLOB SERPL ELPH-MCNC: 0.89 G/DL (ref 0.4–1.2)
ALPHA2 GLOB SERPL ELPH-MCNC: 10.5 % (ref 7–13)
BETA GLOB ABNORMAL SERPL ELPH-MCNC: 0.46 G/DL (ref 0.4–0.8)
BETA1 GLOB SERPL ELPH-MCNC: 5.4 % (ref 5–13)
BETA2 GLOB SERPL ELPH-MCNC: 7.6 % (ref 2–8)
BETA2+GAMMA GLOB SERPL ELPH-MCNC: 0.65 G/DL (ref 0.2–0.5)
GAMMA GLOB ABNORMAL SERPL ELPH-MCNC: 2.81 G/DL (ref 0.5–1.6)
GAMMA GLOB SERPL ELPH-MCNC: 33.1 % (ref 12–22)
IGG/ALB SER: 0.64 {RATIO} (ref 1.1–1.8)
M PEAK ID 2: 29.2 %
M PROTEIN 1 MFR SERPL ELPH: 4.9 %
M PROTEIN 1 SERPL ELPH-MCNC: 0.42 G/DL
M PROTEIN 2 SERPL ELPH-MCNC: 2.48 G/DL
PROT SERPL-MCNC: 8.5 G/DL (ref 6.4–8.2)

## 2019-01-28 NOTE — TELEPHONE ENCOUNTER
Log dated 1/28/2019 scanned and attached to this encounter  Current diabetic medications patient is taking: Toujeo 1 units @ hs  Humalog - sliding scale    Please review and recommend

## 2019-02-05 ENCOUNTER — TRANSCRIBE ORDERS (OUTPATIENT)
Dept: ADMINISTRATIVE | Age: 54
End: 2019-02-05

## 2019-02-05 ENCOUNTER — LAB (OUTPATIENT)
Dept: LAB | Age: 54
End: 2019-02-05
Payer: MEDICARE

## 2019-02-05 DIAGNOSIS — E03.9 ACQUIRED HYPOTHYROIDISM: ICD-10-CM

## 2019-02-05 DIAGNOSIS — E10.8 TYPE I DIABETES MELLITUS WITH MANIFESTATIONS (HCC): Primary | ICD-10-CM

## 2019-02-05 DIAGNOSIS — E10.8 TYPE I DIABETES MELLITUS WITH MANIFESTATIONS (HCC): ICD-10-CM

## 2019-02-05 DIAGNOSIS — E55.9 VITAMIN D DEFICIENCY: ICD-10-CM

## 2019-02-05 DIAGNOSIS — I10 ESSENTIAL HYPERTENSION: ICD-10-CM

## 2019-02-05 DIAGNOSIS — E11.649 UNCONTROLLED TYPE 2 DIABETES MELLITUS WITH HYPOGLYCEMIA, UNSPECIFIED HYPOGLYCEMIA COMA STATUS (HCC): ICD-10-CM

## 2019-02-05 DIAGNOSIS — E11.649 UNCONTROLLED TYPE 2 DIABETES MELLITUS WITH HYPOGLYCEMIA, UNSPECIFIED HYPOGLYCEMIA COMA STATUS (HCC): Primary | ICD-10-CM

## 2019-02-05 DIAGNOSIS — E10.8 TYPE 1 DIABETES MELLITUS WITH COMPLICATION (HCC): ICD-10-CM

## 2019-02-05 LAB
ANION GAP SERPL CALCULATED.3IONS-SCNC: 4 MMOL/L (ref 4–13)
BUN SERPL-MCNC: 37 MG/DL (ref 5–25)
CALCIUM SERPL-MCNC: 8.9 MG/DL (ref 8.3–10.1)
CHLORIDE SERPL-SCNC: 108 MMOL/L (ref 100–108)
CHOLEST SERPL-MCNC: 121 MG/DL (ref 50–200)
CO2 SERPL-SCNC: 24 MMOL/L (ref 21–32)
CREAT SERPL-MCNC: 1.82 MG/DL (ref 0.6–1.3)
CREAT UR-MCNC: 48.5 MG/DL
EST. AVERAGE GLUCOSE BLD GHB EST-MCNC: 97 MG/DL
GFR SERPL CREATININE-BSD FRML MDRD: 31 ML/MIN/1.73SQ M
GLUCOSE P FAST SERPL-MCNC: 78 MG/DL (ref 65–99)
HBA1C MFR BLD: 5 % (ref 4.2–6.3)
HDLC SERPL-MCNC: 40 MG/DL (ref 40–60)
LDLC SERPL CALC-MCNC: 63 MG/DL (ref 0–100)
MICROALBUMIN UR-MCNC: 43.9 MG/L (ref 0–20)
MICROALBUMIN/CREAT 24H UR: 91 MG/G CREATININE (ref 0–30)
NONHDLC SERPL-MCNC: 81 MG/DL
POTASSIUM SERPL-SCNC: 4.4 MMOL/L (ref 3.5–5.3)
SODIUM SERPL-SCNC: 136 MMOL/L (ref 136–145)
T4 FREE SERPL-MCNC: 1.03 NG/DL (ref 0.76–1.46)
TRIGL SERPL-MCNC: 91 MG/DL
TSH SERPL DL<=0.05 MIU/L-ACNC: 4.75 UIU/ML (ref 0.36–3.74)

## 2019-02-05 PROCEDURE — 80061 LIPID PANEL: CPT

## 2019-02-05 PROCEDURE — 84439 ASSAY OF FREE THYROXINE: CPT

## 2019-02-05 PROCEDURE — 82570 ASSAY OF URINE CREATININE: CPT

## 2019-02-05 PROCEDURE — 84681 ASSAY OF C-PEPTIDE: CPT

## 2019-02-05 PROCEDURE — 36415 COLL VENOUS BLD VENIPUNCTURE: CPT

## 2019-02-05 PROCEDURE — 82043 UR ALBUMIN QUANTITATIVE: CPT

## 2019-02-05 PROCEDURE — 80048 BASIC METABOLIC PNL TOTAL CA: CPT

## 2019-02-05 PROCEDURE — 83036 HEMOGLOBIN GLYCOSYLATED A1C: CPT

## 2019-02-05 PROCEDURE — 84443 ASSAY THYROID STIM HORMONE: CPT

## 2019-02-06 ENCOUNTER — TELEPHONE (OUTPATIENT)
Dept: ENDOCRINOLOGY | Facility: CLINIC | Age: 54
End: 2019-02-06

## 2019-02-06 LAB — C PEPTIDE SERPL-MCNC: 1.7 NG/ML (ref 1.1–4.4)

## 2019-02-06 NOTE — TELEPHONE ENCOUNTER
Log dated 2/6/2019 scanned and attached to this encounter  Current diabetic medications patient is taking: Toujeo 1 unit before bed  Humalog - sliding scale    Please review and recommend

## 2019-02-06 NOTE — TELEPHONE ENCOUNTER
Please inform patient to continue current regimen  Blood sugars are in optimal range    Elvie Pickering MD

## 2019-02-07 ENCOUNTER — OFFICE VISIT (OUTPATIENT)
Dept: CARDIOLOGY CLINIC | Facility: CLINIC | Age: 54
End: 2019-02-07
Payer: MEDICARE

## 2019-02-07 VITALS
DIASTOLIC BLOOD PRESSURE: 60 MMHG | HEART RATE: 72 BPM | BODY MASS INDEX: 34.71 KG/M2 | HEIGHT: 68 IN | SYSTOLIC BLOOD PRESSURE: 162 MMHG | WEIGHT: 229 LBS

## 2019-02-07 DIAGNOSIS — I12.9 BENIGN HYPERTENSION WITH CKD (CHRONIC KIDNEY DISEASE) STAGE IV (HCC): ICD-10-CM

## 2019-02-07 DIAGNOSIS — N18.4 BENIGN HYPERTENSION WITH CKD (CHRONIC KIDNEY DISEASE) STAGE IV (HCC): ICD-10-CM

## 2019-02-07 DIAGNOSIS — I06.1 RHEUMATIC AORTIC VALVE INSUFFICIENCY: Primary | ICD-10-CM

## 2019-02-07 DIAGNOSIS — R94.31 ABNORMAL ECG: ICD-10-CM

## 2019-02-07 PROCEDURE — 99214 OFFICE O/P EST MOD 30 MIN: CPT | Performed by: INTERNAL MEDICINE

## 2019-02-07 PROCEDURE — 93000 ELECTROCARDIOGRAM COMPLETE: CPT | Performed by: INTERNAL MEDICINE

## 2019-02-07 NOTE — PROGRESS NOTES
Cardiology Follow Up    [facility]  1965  [id number]  [address] [facility]  [address] [place] [address] [address] [place] [address]    1  Rheumatic aortic valve insufficiency  POCT ECG    Echo complete with contrast if indicated   2  Benign hypertension with CKD (chronic kidney disease) stage IV ([address] )     3  Abnormal ECG           Discussion/Summary: All of her assessed cardiac problems are stable  I have reviewed her medications and made no changes  She will be scheduled for a repeat echo just prior to her next OV in 9 months  Interval History: She has not had any cardiac problems since her last OV on 4/20/2017  She appears in much better spirits than I have seen her in in the past  She says that she is active and denies CP, SOB, palpitations  She has known moderate AI  Last echo was 7/2018 - EF was 60%  She had prior kidney and pancreas transplant  She follows with nephrology for her CKD and HTN      Patient Active Problem List   Diagnosis    Renal transplant recipient    Chronic kidney disease, stage 4 (severe) ([address] )    Acquired hypothyroidism    Benign hypertension with CKD (chronic kidney disease) stage IV (HCC)    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 ([address] )    Essential hypertension    Anemia    Status post kidney transplant    Immunosuppression (HCC)    Chronic diastolic congestive heart failure (HCC)    Urinary incontinence    Calculus, bladder, diverticulum    Stage 3 chronic kidney disease (HCC)    Hypercalcemia    MGUS (monoclonal gammopathy of unknown significance)    Chronic constipation    Acid reflux disease    Atrial fibrillation (HCC)    Kaiser esophagus    Cataract    Cocaine abuse in remission ([address] )    Gastric and duodenal disease    Diabetic nephropathy (HCC)    Diabetic polyneuropathy (HCC)    Retinopathy, diabetic, bilateral ([address] )    Diastolic dysfunction    Essential and other specified forms of tremor    Failure of pancreas transplant    FELIPE (generalized anxiety disorder)    Hyperlipidemia    Indolent multiple myeloma ([facility] )    Irritable bowel syndrome    Major depressive disorder, recurrent episode, in full remission ([address] )    Aortic regurgitation    OAB (overactive bladder)    Osteoporosis    Peripheral vascular disease (HCC)    Post-traumatic stress disorder, chronic    Recurrent UTI    Restless legs syndrome    Secondary hyperparathyroidism, renal ([facility] )    Anxiety    Neck strain    Neuropathy in diabetes ([facility] )    Periorbital cellulitis of left eye    Herpes zoster without complication    History of recurrent UTIs    Subclavian artery stenosis, left (HCC)    Abnormal ECG     Past Medical History:   Diagnosis Date    Abnormal liver function test     Acute kidney injury ([facility] )     Acute on chronic congestive heart failure (HCC)     Allergic urticaria     Anemia     Cancer (HCC)     Multiple myeloma    Cervical dysplasia     Cholelithiasis     Chronic diastolic (congestive) heart failure ([facility] ) 9/18/2017    Diabetes mellitus (HCC)     Previous, controlled with diet    Diabetes mellitus with foot ulcer ([facility] )     Disease of thyroid gland     Encephalopathy     Hematuria     + leak est- secondary to UTIs/panc drainage    History of transfusion     Hyperkalemia     Hypertension     Iliotibial band syndrome     Lumbar radiculopathy     Night blindness     Nonrheumatic aortic (valve) insufficiency     Pneumonia     Renal disorder     Retinopathy     Seborrhea     Seizure ([facility] )     Shingles     Sinus tachycardia     B blocker - cardio echo stress test 02 normal/neg LE doppler 2/02 OK and 12/07    Status post simultaneous kidney and pancreas transplant ([facility] )     Toe amputation status ([facility] )     Trochanteric bursitis      Social History     Social History    Marital status: Legally      Spouse name: N/A  Number of children: 0    Years of education: 2 years of college     Occupational History    On disability      Social History Main Topics    Smoking status: Former Smoker     Quit date: 4/28/2012    Smokeless tobacco: Never Used    Alcohol use No      Comment: (history)    Drug use: Yes     Types: Hydrocodone, Cocaine      Comment: Past cocaine use many years ago - no current use    Sexual activity: No     Other Topics Concern    Not on file     Social History Narrative    Education: some college    Learning Disabilities: none    Marital History:     Children: none    Living Arrangement: lives alone    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, on permanent disability    Functioning Relationships: limited support system    Legal History: none     History: None      Family History   Problem Relation Age of Onset    Hypertension Mother     Cancer Mother     Hypertension Father     Cancer Father     Cancer Maternal Grandfather     Cancer Paternal Grandmother     Cancer Paternal Grandfather     Depression Sister     Breast cancer Maternal Grandmother     Cancer Other      Past Surgical History:   Procedure Laterality Date    CATARACT EXTRACTION      CHOLECYSTECTOMY      COLONOSCOPY      two polyps in the rectum removed and biopsied diverticulosis in the sigmoid colon, external hemorrhoiods- Dr Barfield    COMBINED KIDNEY-PANCREAS TRANSPLANT N/A     CYSTOSCOPY N/A 10/13/2016    Procedure: CYSTOSCOPY, retrograde pyelogram, biopsy of ureteral polyp; Surgeon: Margarita Rosario MD;  Location: [facility];  Service:    [employer] DILATION AND CURETTAGE OF UTERUS      ESOPHAGOGASTRODUODENOSCOPY N/A 11/20/2017    Procedure: ESOPHAGOGASTRODUODENOSCOPY (EGD); Surgeon: Lieutenant Mary DO;  Location: [facility];   Service: Gastroenterology    EYE SURGERY      cataracts    FOOT AMPUTATION THROUGH METATARSAL Left     FOOT SURGERY Right     excision of metatarsal heads    HALLUX VALGUS CORRECTION Right     NEPHRECTOMY TRANSPLANTED ORGAN      PANCREATIC TRANSPLANT REMOVAL  1998       Current Outpatient Prescriptions:     amLODIPine (NORVASC) 10 mg tablet, TAKE 1 TABLET BY MOUTH EVERY MORNING AND 1/2 TABLET EVERY EVENING, Disp: 135 tablet, Rfl: 3    ARIPiprazole (ABILIFY) 30 mg tablet, Take 1 tablet (30 mg total) by mouth daily at bedtime for 180 days, Disp: 90 tablet, Rfl: 1    aspirin 81 MG tablet, Take 1 tablet by mouth daily, Disp: , Rfl:     busPIRone (BUSPAR) 5 mg tablet, Take 1 tablet (5 mg total) by mouth 2 (two) times a day for 180 days, Disp: 180 tablet, Rfl: 1    Cholecalciferol (VITAMIN D3) 1000 units CAPS, Take by mouth, Disp: , Rfl:     cloNIDine (CATAPRES) 0 1 mg tablet, TAKE 3 TABLETS EVERY MORNING, THEN 2 TABLETS AT NOON, THEN 3 TABLETS EVERY EVENING, Disp: 720 tablet, Rfl: 4    doxazosin (CARDURA) 1 mg tablet, Take 3 tablets (3 mg total) by mouth daily at bedtime, Disp: 90 tablet, Rfl: 3    DULoxetine (CYMBALTA) 60 mg delayed release capsule, Take 1 capsule (60 mg total) by mouth 2 (two) times a day for 180 days, Disp: 180 capsule, Rfl: 1    folic acid (FOLVITE) 1 mg tablet, TAKE 1 TABLET BY MOUTH DAILY AS DIRECTED, Disp: 90 tablet, Rfl: 0    furosemide (LASIX) 20 mg tablet, Take 1 tablet (20 mg total) by mouth daily, Disp: 90 tablet, Rfl: 4    hydrALAZINE (APRESOLINE) 50 mg tablet, Take 1 tablet (50 mg total) by mouth 3 (three) times a day for 90 days, Disp: 270 tablet, Rfl: 3    Insulin Glargine (TOUJEO SOLOSTAR) 300 units/mL CONCETRATED U-300 injection pen, Inject 1 Units under the skin daily at bedtime, Disp: 4 pen, Rfl: 0    insulin lispro (HUMALOG KWIKPEN) 100 units/mL injection pen, Inject insulin per sliding scale (150-200=2u, 201-250=4u, 251-300=6u, 301-350=8u, >350=10u); patient uses 10 units daily  , Disp: 5 pen, Rfl: 0    levothyroxine 112 mcg tablet, TAKE 1 TABLET(112 MCG TOTAL) BY MOUTH DAILY MONDAY THRU SATURDAY; ON SUNDAYS TAKE 1 TABLET ALONG WITH 1 TABLET OF LEVOTHYROXINE 50 MCG, Disp: 90 tablet, Rfl: 2    levothyroxine 50 mcg tablet, Take 1 tablet by mouth only on Sundays, take with 112 mcg dose , Disp: 12 tablet, Rfl: 0    [START ON 3/27/2019] LORazepam (ATIVAN) 2 mg tablet, Take 1 tablet (2 mg total) by mouth 3 (three) times a day as needed for anxiety for up to 90 days To be filled on or after 3/27/19, Disp: 90 tablet, Rfl: 2    metoprolol tartrate (LOPRESSOR) 50 mg tablet, TAKE 1 TABLET(50MG TOTAL) BY MOUTH TWICE DAILY, Disp: 180 tablet, Rfl: 5    pravastatin (PRAVACHOL) 80 mg tablet, TAKE 1 TABLET BY MOUTH DAILY, Disp: 90 tablet, Rfl: 5    predniSONE 5 mg tablet, Take 1 tablet by mouth daily, Disp: , Rfl:     rOPINIRole (REQUIP) 0 25 mg tablet, TAKE 1 TABLET BY MOUTH TWICE DAILY, Disp: 180 tablet, Rfl: 0    sertraline (ZOLOFT) 100 mg tablet, Take 2 tablets (200 mg total) by mouth daily for 180 days, Disp: 180 tablet, Rfl: 1    sodium bicarbonate 650 mg tablet, Take 2 tablets (1,300 mg total) by mouth 3 (three) times a day, Disp: 180 tablet, Rfl: 5    tacrolimus (PROGRAF) 1 mg capsule, Take 3 mg by mouth 2 (two) times a day 3 mg BID , Disp: , Rfl:     Insulin Pen Needle (BD PEN NEEDLE VISHNU U/F) 32G X 4 MM MISC, Use 4 times daily, Disp: 400 each, Rfl: 1    Lancets (ONETOUCH ULTRASOFT) lancets, by Does not apply route 4 (four) times a day  , Disp: , Rfl:     ONE TOUCH ULTRA TEST test strip, Use 4 times daily ( please dispense One touch Ultra test strips), Disp: 400 each, Rfl: 1  Allergies   Allergen Reactions    Morphine      Other reaction(s): Other (See Comments)  projectile vomiting    Cefadroxil     Morphine And Related GI Intolerance    Myrbetriq [Mirabegron] Hives    Penicillins Hives     Other reaction(s):  Other (See Comments)  Respiratory Distress,hives  Tolerates cefazolin     Vitals:    02/07/19 0920   BP: 162/60   BP Location: Right arm   Patient Position: Sitting   Cuff Size: Standard   Pulse: 72   Weight: 104 kg (229 lb) Height: 5' 8" (1 727 m)     Weight (last 2 days)     Date/Time   Weight    02/07/19 0920  104 (229)             Blood pressure 162/60, pulse 72, height 5' 8" (1 727 m), weight 104 kg (229 lb)  , Body mass index is 34 82 kg/m²  Labs:  Lab on 02/05/2019   Component Date Value    TSH 3RD GENERATON 02/05/2019 4 750*    Free T4 02/05/2019 1 03     Hemoglobin A1C 02/05/2019 5 0     EAG 02/05/2019 97     Creatinine, Ur 02/05/2019 48 5     Microalbum  ,U,Random 02/05/2019 43 9*    Microalb Creat Ratio 02/05/2019 91*    Cholesterol 02/05/2019 121     Triglycerides 02/05/2019 91     HDL, Direct 02/05/2019 40     LDL Calculated 02/05/2019 63     Non-HDL-Chol (CHOL-HDL) 02/05/2019 81     Sodium 02/05/2019 136     Potassium 02/05/2019 4 4     Chloride 02/05/2019 108     CO2 02/05/2019 24     ANION GAP 02/05/2019 4     BUN 02/05/2019 37*    Creatinine 02/05/2019 1 82*    Glucose, Fasting 02/05/2019 78     Calcium 02/05/2019 8 9     eGFR 02/05/2019 31     C-Peptide 02/05/2019 1 7    Appointment on 01/24/2019   Component Date Value    IGA 01/24/2019 66 0*    IGG 01/24/2019 2730 0*    IGM 01/24/2019 25 0*    A/G Ratio 01/24/2019 0 64*    Albumin Electrophoresis 01/24/2019 39 2*    Albumin CONC 01/24/2019 3 33*    Alpha 1 01/24/2019 4 2     ALPHA 1 CONC 01/24/2019 0 36     Alpha 2 01/24/2019 10 5     ALPHA 2 CONC 01/24/2019 0 89     Beta-1 01/24/2019 5 4     BETA 1 CONC 01/24/2019 0 46     Beta-2 01/24/2019 7 6     BETA 2 CONC 01/24/2019 0 65*    Gamma Globulin 01/24/2019 33 1*    GAMMA CONC 01/24/2019 2 81*    M Peak ID 1 01/24/2019 4 90     M PEAK 1 CONC 01/24/2019 0 42     M Peak ID 2 01/24/2019 29 20     M Peak 2 CONC 01/24/2019 2 48     SPEP Interpretation 01/24/2019                      Value: The serum total protein is increased and albumin is decreased  The serum protein electrophoresis shows an increased beta-2 region   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  Previous immunofixation on 10/10/18 identified two IgG kappa bands   Reviewed by: Waylon Callaway MD  ([id number])  **Electronic Signature**    Total Protein 01/24/2019 8 5*    LD 01/24/2019 180     Ig [place] Free Light Chain 01/24/2019 59 3*    Ig Lambda Free Light Clari* 01/24/2019 20 7     Kappa/Lambda FluidC Ratio 01/24/2019 2 86*    C-Peptide 01/24/2019 1 8     TSH 3RD GENERATON 01/24/2019 6 420*    Cholesterol 01/24/2019 137     Triglycerides 01/24/2019 157*    HDL, Direct 01/24/2019 38*    LDL Calculated 01/24/2019 68     Non-HDL-Chol (CHOL-HDL) 01/24/2019 99     Urine Culture 01/24/2019 >100,000 cfu/ml Escherichia coli*    Free T4 01/24/2019 0 91    Orders Only on 12/20/2018   Component Date Value    Right Eye Diabetic Retin* 12/13/2018 Proliferative     Left Eye Diabetic Retino* 12/13/2018 Proliferative    Office Visit on 11/26/2018   Component Date Value    LEUKOCYTE ESTERASE,UA 11/26/2018 500Leu/uL     NITRITE,UA 11/26/2018 neg     SL AMB POCT UROBILINOGEN 11/26/2018 0 2mg/dL     POCT URINE PROTEIN 11/26/2018 15mg/dL      PH,UA 11/26/2018 7 5     BLOOD,UA 11/26/2018 neg     SPECIFIC GRAVITY,UA 11/26/2018 1 010     KETONES,UA 11/26/2018 neg     BILIRUBIN,UA 11/26/2018 neg     GLUCOSE, UA 11/26/2018 neg      COLOR,UA 11/26/2018 yellow     CLARITY,UA 11/26/2018 cloudy     Urine Culture 11/26/2018 >100,000 cfu/ml Escherichia coli*    Color, UA 11/26/2018 Yellow     Clarity, UA 11/26/2018 Cloudy     Specific Gravity, UA 11/26/2018 1 016     pH, UA 11/26/2018 7 5     Leukocytes, UA 11/26/2018 Large*    Nitrite, UA 11/26/2018 Positive*    Protein, UA 11/26/2018 30 (1+)*    Glucose, UA 11/26/2018 Negative     Ketones, UA 11/26/2018 Negative     Urobilinogen, UA 11/26/2018 0 2     Bilirubin, UA 11/26/2018 Negative     Blood, UA 11/26/2018 Negative     RBC, UA 11/26/2018 None Seen     WBC, UA 11/26/2018 Innumerable*    Epithelial Cells 11/26/2018 None Seen     Bacteria, UA 11/26/2018 Moderate*    Hyaline Casts, UA 11/26/2018 5-10*   Lab on 11/19/2018   Component Date Value    Urine Culture 11/19/2018 >100,000 cfu/ml Escherichia coli*   Ancillary Orders on 11/16/2018   Component Date Value    Sodium 11/19/2018 135*    Potassium 11/19/2018 4 0     Chloride 11/19/2018 109*    CO2 11/19/2018 19*    ANION GAP 11/19/2018 7     BUN 11/19/2018 49*    Creatinine 11/19/2018 1 97*    Glucose, Fasting 11/19/2018 70     Calcium 11/19/2018 8 1*    AST 11/19/2018 19     ALT 11/19/2018 26     Alkaline Phosphatase 11/19/2018 121*    Total Protein 11/19/2018 9 1*    Albumin 11/19/2018 3 0*    Total Bilirubin 11/19/2018 0 24     eGFR 11/19/2018 28     Magnesium 11/19/2018 2 4     Phosphorus 11/19/2018 4 6*    Creatinine, Ur 11/19/2018 50 7     Protein Urine Random 11/19/2018 82     Prot/Creat Ratio, Ur 11/19/2018 1 62*    TACROLIMUS 11/19/2018 6 1     Clarity, UA 11/19/2018 Cloudy     Color, UA 11/19/2018 Yellow     Specific Gravity, UA 11/19/2018 1 013     pH, UA 11/19/2018 7 5     Glucose, UA 11/19/2018 Negative     Ketones, UA 11/19/2018 Negative     Blood, UA 11/19/2018 Negative     Protein, UA 11/19/2018 30 (1+)*    Nitrite, UA 11/19/2018 Positive*    Bilirubin, UA 11/19/2018 Negative     Urobilinogen, UA 11/19/2018 0 2     Leukocytes, UA 11/19/2018 Large*    WBC, UA 11/19/2018 2-4*    RBC, UA 11/19/2018 None Seen     Bacteria, UA 11/19/2018 Innumerable*    Epithelial Cells 11/19/2018 Occasional    Ancillary Orders on 10/19/2018   Component Date Value    Sodium 01/24/2019 133*    Potassium 01/24/2019 4 1     Chloride 01/24/2019 104     CO2 01/24/2019 23     ANION GAP 01/24/2019 6     BUN 01/24/2019 37*    Creatinine 01/24/2019 1 59*    Glucose, Fasting 01/24/2019 123*    Calcium 01/24/2019 9 1     AST 01/24/2019 21     ALT 01/24/2019 27     Alkaline Phosphatase 01/24/2019 108     Total Protein 01/24/2019 9 1*    Albumin 01/24/2019 3 2*    Total Bilirubin 01/24/2019 0 35     eGFR 01/24/2019 37     TACROLIMUS 01/24/2019 5 1     Clarity, UA 01/24/2019 Cloudy     Color, UA 01/24/2019 Yellow     Specific Gravity, UA 01/24/2019 1 014     pH, UA 01/24/2019 6 5     Glucose, UA 01/24/2019 Negative     Ketones, UA 01/24/2019 Negative     Blood, UA 01/24/2019 Negative     Protein, UA 01/24/2019 30 (1+)*    Nitrite, UA 01/24/2019 Positive*    Bilirubin, UA 01/24/2019 Negative     Urobilinogen, UA 01/24/2019 0 2     Leukocytes, UA 01/24/2019 Large*    WBC, UA 01/24/2019 30-50*    RBC, UA 01/24/2019 None Seen     Hyaline Casts, UA 01/24/2019 10-25*    Bacteria, UA 01/24/2019 Innumerable*    Epithelial Cells 01/24/2019 None Seen    Appointment on 10/10/2018   Component Date Value    WBC 10/10/2018 12 23*    RBC 10/10/2018 3 65*    Hemoglobin 10/10/2018 11 4*    Hematocrit 10/10/2018 37 0     MCV 10/10/2018 101*    MCH 10/10/2018 31 2     MCHC 10/10/2018 30 8*    RDW 10/10/2018 16 3*    MPV 10/10/2018 12 9*    Platelets 28/79/7543 191     nRBC 10/10/2018 0     Neutrophils Relative 10/10/2018 78*    Immat GRANS % 10/10/2018 1     Lymphocytes Relative 10/10/2018 12*    Monocytes Relative 10/10/2018 6     Eosinophils Relative 10/10/2018 2     Basophils Relative 10/10/2018 1     Neutrophils Absolute 10/10/2018 9 74*    Immature Grans Absolute 10/10/2018 0 08     Lymphocytes Absolute 10/10/2018 1 41     Monocytes Absolute 10/10/2018 0 70     Eosinophils Absolute 10/10/2018 0 22     Basophils Absolute 10/10/2018 0 08     A/G Ratio 10/10/2018 0 60*    Albumin Electrophoresis 10/10/2018 37 6*    Albumin CONC 10/10/2018 3 05*    Alpha 1 10/10/2018 5 7*    ALPHA 1 CONC 10/10/2018 0 46*    Alpha 2 10/10/2018 12 4     ALPHA 2 CONC 10/10/2018 1 00     Beta-1 10/10/2018 5 1     BETA 1 CONC 10/10/2018 0 41     Beta-2 10/10/2018 7 3     BETA 2 CONC 10/10/2018 0 59*    Gamma Globulin 10/10/2018 31 9*    GAMMA CONC 10/10/2018 2 58*    M Peak ID 1 10/10/2018 5 30     M PEAK 1 CONC 10/10/2018 0 43     M Peak ID 2 10/10/2018 27 90     M Peak 2 CONC 10/10/2018 2 26     SPEP Interpretation 10/10/2018                      Value: The serum total protein is within normal limits with hypoalbuminemia  The serum protein electrophoresis shows increased alpha-1 and  beta-2 regions  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  Previous immunofixation identified two IgG kappa bands  Repeat immunofixation to be performed  Reviewed by: Herve Contreras MD ([id number]) **Electronic Signature**    Total Protein 10/10/2018 8 1     LD 10/10/2018 167     IGA 10/10/2018 57 0*    IGG 10/10/2018 2440 0*    IGM 10/10/2018 31 0*    Ig [place] Free Light Chain 10/10/2018 56 8*    Ig Lambda Free Light Clari* 10/10/2018 18 4     Kappa/Lambda FluidC Ratio 10/10/2018 3 09*    Immunofixation Interpret* 10/10/2018 Serum immunofixation shows a biclonal gammopathy identified as IgG kappa (M-Peak 1 = 0 43 g/dL and M-Peak 2 = 2 26 g/dL)  Reviewed by: Herve Contreras MD  ([id number])  **Electronic Signature**    Ancillary Orders on 09/21/2018   Component Date Value    Sodium 10/10/2018 134*    Potassium 10/10/2018 4 5     Chloride 10/10/2018 104     CO2 10/10/2018 25     ANION GAP 10/10/2018 5     BUN 10/10/2018 26*    Creatinine 10/10/2018 1 95*    Glucose, Fasting 10/10/2018 135*    Calcium 10/10/2018 9 3     AST 10/10/2018 19     ALT 10/10/2018 28     Alkaline Phosphatase 10/10/2018 107     Total Protein 10/10/2018 8 4*    Albumin 10/10/2018 2 6*    Total Bilirubin 10/10/2018 0 32     eGFR 10/10/2018 29     Magnesium 10/10/2018 2 5     Phosphorus 10/10/2018 3 6     Creatinine, Ur 10/10/2018 94 6     Protein Urine Random 10/10/2018 153     Prot/Creat Ratio, Ur 10/10/2018 1 62*    TACROLIMUS 10/10/2018 6 7     Clarity, UA 10/10/2018 [place]     Color, UA 10/10/2018 Yellow     Specific Gravity, UA 10/10/2018 1 014     pH, UA 10/10/2018 7 5     Glucose, UA 10/10/2018 Negative     Ketones, UA 10/10/2018 Negative     Blood, UA 10/10/2018 Negative     Protein, UA 10/10/2018 30 (1+)*    Nitrite, UA 10/10/2018 Positive*    Bilirubin, UA 10/10/2018 Negative     Urobilinogen, UA 10/10/2018 0 2     Leukocytes, UA 10/10/2018 Large*    WBC, UA 10/10/2018 20-30*    RBC, UA 10/10/2018 None Seen     Bacteria, UA 10/10/2018 Innumerable*    Epithelial Cells 10/10/2018 None Seen    Appointment on 09/20/2018   Component Date Value    Sodium 09/20/2018 139     Potassium 09/20/2018 3 6     Chloride 09/20/2018 110*    CO2 09/20/2018 19*    ANION GAP 09/20/2018 10     BUN 09/20/2018 32*    Creatinine 09/20/2018 1 79*    Glucose, Fasting 09/20/2018 78     Calcium 09/20/2018 9 1     AST 09/20/2018 20     ALT 09/20/2018 31     Alkaline Phosphatase 09/20/2018 106     Total Protein 09/20/2018 8 8*    Albumin 09/20/2018 2 6*    Total Bilirubin 09/20/2018 0 36     eGFR 09/20/2018 32     Magnesium 09/20/2018 2 1     Phosphorus 09/20/2018 3 4     TACROLIMUS 09/20/2018 5 8    There may be more visits with results that are not included  Imaging: No results found  Review of Systems:  Review of Systems   Constitutional: Negative for diaphoresis, fatigue, fever and unexpected weight change  HENT: Negative  Respiratory: Negative for cough, shortness of breath and wheezing  Cardiovascular: Negative for chest pain, palpitations and leg swelling  Gastrointestinal: Negative for abdominal pain, diarrhea and nausea  Musculoskeletal: Negative for gait problem and myalgias  Skin: Negative for rash  Neurological: Negative for dizziness and numbness  Psychiatric/Behavioral: Negative  Physical Exam:  Physical Exam   Constitutional: She is oriented to person, place, and time  She appears well-developed and well-nourished     HENT:   Head: Normocephalic and atraumatic  Eyes: Pupils are equal, round, and reactive to light  Neck: Normal range of motion  Neck supple  No JVD present  Cardiovascular: Regular rhythm, S1 normal, S2 normal and normal pulses  Pulses:       Carotid pulses are 2+ on the right side, and 2+ on the left side  Pulmonary/Chest: Effort normal and breath sounds normal  She has no wheezes  She has no rales  Abdominal: Soft  Bowel sounds are normal  There is no tenderness  Musculoskeletal: Normal range of motion  She exhibits no edema or tenderness  Neurological: She is alert and oriented to person, place, and time  She has normal reflexes  No cranial nerve deficit  Skin: Skin is warm  Psychiatric: She has a normal mood and affect

## 2019-02-08 ENCOUNTER — OFFICE VISIT (OUTPATIENT)
Dept: ENDOCRINOLOGY | Facility: CLINIC | Age: 54
End: 2019-02-08
Payer: MEDICARE

## 2019-02-08 VITALS
HEIGHT: 68 IN | SYSTOLIC BLOOD PRESSURE: 196 MMHG | BODY MASS INDEX: 34.98 KG/M2 | WEIGHT: 230.8 LBS | DIASTOLIC BLOOD PRESSURE: 70 MMHG | HEART RATE: 76 BPM

## 2019-02-08 DIAGNOSIS — E10.3599: ICD-10-CM

## 2019-02-08 DIAGNOSIS — E55.9 VITAMIN D DEFICIENCY: ICD-10-CM

## 2019-02-08 DIAGNOSIS — I10 ESSENTIAL HYPERTENSION: ICD-10-CM

## 2019-02-08 DIAGNOSIS — E10.42 TYPE 1 DIABETES MELLITUS WITH DIABETIC POLYNEUROPATHY (HCC): ICD-10-CM

## 2019-02-08 DIAGNOSIS — E78.5 HYPERLIPIDEMIA, UNSPECIFIED HYPERLIPIDEMIA TYPE: ICD-10-CM

## 2019-02-08 DIAGNOSIS — E03.9 ACQUIRED HYPOTHYROIDISM: Primary | ICD-10-CM

## 2019-02-08 DIAGNOSIS — E03.9 ACQUIRED HYPOTHYROIDISM: ICD-10-CM

## 2019-02-08 PROCEDURE — 99214 OFFICE O/P EST MOD 30 MIN: CPT | Performed by: INTERNAL MEDICINE

## 2019-02-08 RX ORDER — LEVOTHYROXINE SODIUM 0.12 MG/1
125 TABLET ORAL DAILY
Qty: 30 TABLET | Refills: 4 | Status: SHIPPED | OUTPATIENT
Start: 2019-02-08 | End: 2019-02-08 | Stop reason: SDUPTHER

## 2019-02-08 RX ORDER — LEVOTHYROXINE SODIUM 0.12 MG/1
TABLET ORAL
Qty: 90 TABLET | Refills: 4 | Status: SHIPPED | OUTPATIENT
Start: 2019-02-08 | End: 2019-06-24 | Stop reason: SDUPTHER

## 2019-02-20 ENCOUNTER — APPOINTMENT (OUTPATIENT)
Dept: LAB | Age: 54
End: 2019-02-20
Payer: MEDICARE

## 2019-02-21 DIAGNOSIS — G25.81 RLS (RESTLESS LEGS SYNDROME): ICD-10-CM

## 2019-02-21 RX ORDER — ROPINIROLE 0.25 MG/1
TABLET, FILM COATED ORAL
Qty: 180 TABLET | Refills: 1 | Status: SHIPPED | OUTPATIENT
Start: 2019-02-21 | End: 2019-08-18 | Stop reason: SDUPTHER

## 2019-02-22 ENCOUNTER — TELEPHONE (OUTPATIENT)
Dept: NEPHROLOGY | Facility: CLINIC | Age: 54
End: 2019-02-22

## 2019-02-22 DIAGNOSIS — E78.2 MIXED HYPERLIPIDEMIA: ICD-10-CM

## 2019-02-22 RX ORDER — FOLIC ACID 1 MG/1
TABLET ORAL
Qty: 90 TABLET | Refills: 3 | Status: SHIPPED | OUTPATIENT
Start: 2019-02-22 | End: 2020-02-15

## 2019-02-25 ENCOUNTER — OFFICE VISIT (OUTPATIENT)
Dept: NEPHROLOGY | Facility: CLINIC | Age: 54
End: 2019-02-25
Payer: MEDICARE

## 2019-02-25 VITALS
SYSTOLIC BLOOD PRESSURE: 166 MMHG | BODY MASS INDEX: 34.56 KG/M2 | HEIGHT: 68 IN | WEIGHT: 228 LBS | HEART RATE: 66 BPM | DIASTOLIC BLOOD PRESSURE: 72 MMHG

## 2019-02-25 DIAGNOSIS — D89.2 PARAPROTEINEMIA: ICD-10-CM

## 2019-02-25 DIAGNOSIS — Z94.0 RENAL TRANSPLANT RECIPIENT: Primary | ICD-10-CM

## 2019-02-25 DIAGNOSIS — N18.4 CKD (CHRONIC KIDNEY DISEASE), STAGE IV (HCC): ICD-10-CM

## 2019-02-25 PROCEDURE — 99215 OFFICE O/P EST HI 40 MIN: CPT | Performed by: INTERNAL MEDICINE

## 2019-02-25 RX ORDER — DOXAZOSIN MESYLATE 1 MG/1
2 TABLET ORAL
Qty: 90 TABLET | Refills: 0
Start: 2019-02-25 | End: 2019-08-23 | Stop reason: SDUPTHER

## 2019-02-25 NOTE — LETTER
February 25, 2019     [address]arly [address]    Patient: Sherif Chatterjee   YOB: 1965   Date of Visit: 2/25/2019       Dear Dr Abhilash Roger Recipients: Thank you for referring Sherif Chatterjee to me for evaluation  Below are my notes for this consultation  If you have questions, please do not hesitate to call me  I look forward to following your patient along with you  Sincerely,        Rosario Adams MD        CC: No Recipients  Rosario Adams MD  2/25/2019  2:38 PM  Sign at close encounter  [address] [facility] 46 y o  female MRN: [id number]  2/25/2019    Reason for Visit: renal transplant, CKD    ASSESSMENT and PLAN:    I had the pleasure of seeing Ms Fannie Armas today in the renal clinic for the continued management of CKD IV, renal transplant  45 yo female with PMHx of renal pancreas transplant 1998, DM I, failed pancreas transplant 2017, HTN, recurrent UTI/pyelo, recurrent, 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, Did require dialysis in 2014, orthostasis, anemia, hypothyroid, 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, dCHF, aortic regurg, dCHF grade I, zoster in July, subclavian art stenosis on Left presents for follow-up visit for renal transplant     Patient has had multiple admissions and issues recently     9/2017 -acute kidney injury, urinary frequency   Treated initially for UTI      10/2017 -urinary incontinence   Was started on antibiotics for possible UTI   Was started on Bactrim prophylaxis      11/2017 -abdominal pain   Nausea   Confusion   Depression and anxiety   Patient had EGD which showed gastritis   Given volume expansion        2/2018-depression and anxiety   Treated for UTI also        07/2018-treated for urinary tract infection; also was seen in the ER on 07/23 for periorbital cellulitis     9/2018 - renal artery study at [facility] - patent renal artery and anastamosis that did not require intervention     1) Renal transplant - Baseline Cr 1 6 - 1 9 mg/dL  recurrent UTI/pyelo, recurrent episodes of EDUARDO due to volume depletion  BK negative  Has history of renal artery stenosis     - Cr upper end of baseline 1 94 in February 2019  - follow renal function closely  - advised to drink 2 L fluids daily  - has renal artery stenosis > 60%-s/p renal artery study without stenosis at [facility] with IR  - UA with positive nitrite, innumerable bacteria  - UPCR 1 62 --> rising 3 13 in february  - pt with no urinary symptoms today   - Overall, 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  Patient also has paraproteinemia which is likely contributing to proteinuria also  Patient may need repeat BM biopsy if worsening of renal function  Seeing Hematology soon  - if renal function worsens, may need repeat transplant eval at [facility], but the issues with paraproteinemia and co morbidities may prevent repeat renal transplant     2) Immunosuppression - given Hematological issues, goal tac lower end 4-5     - tac was reduced to 3 mg BID prior with goal tac 4-5  Currently level is 5 2 - no changes  - Continue prednisone 5 mg     3) HTN - history of orthostasis and low diast BP  widened pulse pressure possible due to aortic valve regurg? needs afterload reduction     - pt states now BP rarely goes above 170; mainly in 160s over 50s  - amlodipine 10 mg in AM and 5 mg in PM, clonidine 0 3 TID, doxazosin 3 mg nightly, metoprolol 50 BID, hydralazine 50 TID  -renal artery study with stenosis present - saw [facility] team and had IR procedure on 9/25/18 with CO2 study and 5 cc contrast utilized - the transplant artery is slightly tortuous but the artery was widely patent     4) Anemia -      - prior fec occ positive  - had EGD prior with gastritis  - iron sat low  - iron infusions started in June 2018  - prior to [facility] or [facility], will need to review with Hematology given history of paraproteinemia as above    - follow Hb     5) recurrent UTI/pyelo - as above; completed monosat       - monitor clinically  - pt's E coli has become resistant over time  - advised patient that patient needs to go to ER if develops urinary symptoms as has resistant bacteria     6) Vaccine - should stay up to date on innactivated vaccinations     -hold on taking shingrix vaccine for one year post shingles infection     7) Lipids - as per PCP with regards to lipid check     8) BMD - DEXA due 2017        - monitor for now  -follow PTH and phosphorus - last PTH 56 5     9) proteinuria -likely multifactorial due to advanced age of kidney/allograft nephropathy, paraproteinemia       8) age appropriate ca screening - needs to remain up to date with mammogram an colonoscopy (next c scope is in 8/2018)     - needs to f/u with Derm Yearly - pt will call to make appt  - awaiting repeat EGD with GI     11) depression - Patient states depression is well-controlled with the current regimen      - pt is now off sertraline  - pt is on 4-5 different anti psych/anti depressants  Pt will discuss with Psychiatry team      12) question of renal art stenosis - not evident on study     13) MGUS/IgG Kappa - follows with Hematology/Oncology      -patient may require chemotherapy in the near future  - repeat k/l chains on 10/10 was 3 09 and repeat January 2019 is 2 86, stable     14) Fatigue      15) hyponatremia - improved       16) pancreas transplant -      - failed pancreas allograft - following with Endocrine  - last A1c 5  - C peptide 1 7     17) Volume - relatively euvolemic, but has gained weight and trace edema LE     - cont furosemide     18) zoster     -zoster had second episode in december  - now resolved     19) aortic regurg - on ECHO July with grade I dCHF     20) subclavian art stenosis on L     21) acid/base - bicarb tabs were increased to three times/day     - continue for now   Bicarb slightly lower - recheck next month    22) hypermag - repeat    23) weight gain       It was a pleasure of evaluating Ms Golden Ramsey in the renal office  Please do not hesitate to contact our team if further issues or questions arise in the interim       No problem-specific Assessment & Plan notes found for this encounter  HPI:    Pt denies new complaints  Had shingles in December 2018  BP at home 160/40s    PATIENT INSTRUCTIONS:    Patient Instructions   1) please wait one year from last shingles episode prior to receiving shingrix vaccine  2) complete labwork middle of march  3) appt in 2 months          OBJECTIVE:  Current Weight: Weight - Scale: 103 kg (228 lb)  Vitals:    02/25/19 1355   BP: 166/72   BP Location: Left arm   Patient Position: Sitting   Cuff Size: Large   Pulse: 66   Weight: 103 kg (228 lb)   Height: 5' 8" (1 727 m)    Body mass index is 34 67 kg/m²  REVIEW OF SYSTEMS:    Review of Systems   Constitutional: Negative  Negative for fatigue  HENT: Negative  Eyes: Negative  Respiratory: Negative  Negative for shortness of breath  Cardiovascular: Negative  Negative for leg swelling  Gastrointestinal: Negative  Endocrine: Negative  Genitourinary: Negative  Negative for difficulty urinating  Musculoskeletal: Negative  Skin: Negative  Allergic/Immunologic: Negative  Neurological: Negative  Hematological: Negative  Psychiatric/Behavioral: Negative  All other systems reviewed and are negative  PHYSICAL EXAM:      Physical Exam   Constitutional: She is oriented to person, place, and time  She appears well-developed and well-nourished  No distress  HENT:   Head: Normocephalic and atraumatic  Mouth/Throat: No oropharyngeal exudate  Eyes: Conjunctivae are normal  Right eye exhibits no discharge  Left eye exhibits no discharge  No scleral icterus  Neck: Normal range of motion  Neck supple  No JVD present  Cardiovascular: Normal rate and regular rhythm  Exam reveals no gallop and no friction rub     No murmur heard   Pulmonary/Chest: Effort normal and breath sounds normal  No respiratory distress  She has no wheezes  She has no rales  Abdominal: Soft  Bowel sounds are normal  She exhibits no distension  There is no tenderness  There is no rebound  Musculoskeletal: Normal range of motion  She exhibits no edema, tenderness or deformity  Neurological: She is alert and oriented to person, place, and time  Coordination normal    Skin: Skin is warm and dry  No rash noted  She is not diaphoretic  No erythema  No pallor  Psychiatric: She has a normal mood and affect  Her behavior is normal  Judgment and thought content normal    Nursing note and vitals reviewed        Medications:    Current Outpatient Medications:     amLODIPine (NORVASC) 10 mg tablet, TAKE 1 TABLET BY MOUTH EVERY MORNING AND 1/2 TABLET EVERY EVENING, Disp: 135 tablet, Rfl: 3    ARIPiprazole (ABILIFY) 30 mg tablet, Take 1 tablet (30 mg total) by mouth daily at bedtime for 180 days, Disp: 90 tablet, Rfl: 1    aspirin 81 MG tablet, Take 1 tablet by mouth daily, Disp: , Rfl:     busPIRone (BUSPAR) 5 mg tablet, Take 1 tablet (5 mg total) by mouth 2 (two) times a day for 180 days, Disp: 180 tablet, Rfl: 1    Cholecalciferol (VITAMIN D3) 1000 units CAPS, Take 3 capsules by mouth daily  , Disp: , Rfl:     cloNIDine (CATAPRES) 0 1 mg tablet, TAKE 3 TABLETS EVERY MORNING, THEN 2 TABLETS AT NOON, THEN 3 TABLETS EVERY EVENING (Patient taking differently: TAKE 3 TABLETS EVERY MORNING, THEN 3 TABLETS AT NOON, THEN 3 TABLETS EVERY EVENING), Disp: 720 tablet, Rfl: 4    doxazosin (CARDURA) 1 mg tablet, Take 2 tablets (2 mg total) by mouth daily at bedtime, Disp: 90 tablet, Rfl: 0    DULoxetine (CYMBALTA) 60 mg delayed release capsule, Take 1 capsule (60 mg total) by mouth 2 (two) times a day for 180 days, Disp: 180 capsule, Rfl: 1    folic acid (FOLVITE) 1 mg tablet, TAKE 1 TABLET BY MOUTH DAILY AS DIRECTED, Disp: 90 tablet, Rfl: 3    furosemide (LASIX) 20 mg tablet, Take 1 tablet (20 mg total) by mouth daily, Disp: 90 tablet, Rfl: 4    hydrALAZINE (APRESOLINE) 50 mg tablet, Take 1 tablet (50 mg total) by mouth 3 (three) times a day for 90 days, Disp: 270 tablet, Rfl: 3    Insulin Glargine (TOUJEO SOLOSTAR) 300 units/mL CONCETRATED U-300 injection pen, Inject 1 Units under the skin daily at bedtime, Disp: 4 pen, Rfl: 0    insulin lispro (HUMALOG KWIKPEN) 100 units/mL injection pen, Inject insulin per sliding scale (150-200=2u, 201-250=4u, 251-300=6u, 301-350=8u, >350=10u); patient uses 10 units daily  , Disp: 5 pen, Rfl: 0    Insulin Pen Needle (BD PEN NEEDLE VISHNU U/F) 32G X 4 MM MISC, Use 4 times daily, Disp: 400 each, Rfl: 1    Lancets (ONETOUCH ULTRASOFT) lancets, by Does not apply route 4 (four) times a day  , Disp: , Rfl:     levothyroxine 125 mcg tablet, TAKE 1 TABLET(125MCG TOTAL) BY MOUTH DAILY, Disp: 90 tablet, Rfl: 4    [START ON 3/27/2019] LORazepam (ATIVAN) 2 mg tablet, Take 1 tablet (2 mg total) by mouth 3 (three) times a day as needed for anxiety for up to 90 days To be filled on or after 3/27/19, Disp: 90 tablet, Rfl: 2    metoprolol tartrate (LOPRESSOR) 50 mg tablet, TAKE 1 TABLET(50MG TOTAL) BY MOUTH TWICE DAILY, Disp: 180 tablet, Rfl: 5    ONE TOUCH ULTRA TEST test strip, Use 4 times daily ( please dispense One touch Ultra test strips), Disp: 400 each, Rfl: 1    pravastatin (PRAVACHOL) 80 mg tablet, TAKE 1 TABLET BY MOUTH DAILY, Disp: 90 tablet, Rfl: 5    predniSONE 5 mg tablet, Take 1 tablet by mouth daily, Disp: , Rfl:     rOPINIRole (REQUIP) 0 25 mg tablet, TAKE 1 TABLET BY MOUTH TWICE DAILY, Disp: 180 tablet, Rfl: 1    sertraline (ZOLOFT) 100 mg tablet, Take 2 tablets (200 mg total) by mouth daily for 180 days, Disp: 180 tablet, Rfl: 1    sodium bicarbonate 650 mg tablet, Take 2 tablets (1,300 mg total) by mouth 3 (three) times a day, Disp: 180 tablet, Rfl: 5    tacrolimus (PROGRAF) 1 mg capsule, Take 3 mg by mouth 2 (two) times a day , Disp: , Rfl:     Laboratory Results:  Results from last 7 days   Lab Units 02/20/19  0721   POTASSIUM mmol/L 4 2   CHLORIDE mmol/L 110*   CO2 mmol/L 20*   BUN mg/dL 37*   CREATININE mg/dL 1 94*   CALCIUM mg/dL 8 9   MAGNESIUM mg/dL 2 7*   PHOSPHORUS mg/dL 3 4       Results for orders placed or performed in visit on 02/08/19   Comprehensive metabolic panel   Result Value Ref Range    Sodium 139 136 - 145 mmol/L    Potassium 4 2 3 5 - 5 3 mmol/L    Chloride 110 (H) 100 - 108 mmol/L    CO2 20 (L) 21 - 32 mmol/L    ANION GAP 9 4 - 13 mmol/L    BUN 37 (H) 5 - 25 mg/dL    Creatinine 1 94 (H) 0 60 - 1 30 mg/dL    Glucose, Fasting 64 (L) 65 - 99 mg/dL    Calcium 8 9 8 3 - 10 1 mg/dL    AST 14 5 - 45 U/L    ALT 21 12 - 78 U/L    Alkaline Phosphatase 110 46 - 116 U/L    Total Protein 9 2 (H) 6 4 - 8 2 g/dL    Albumin 3 0 (L) 3 5 - 5 0 g/dL    Total Bilirubin 0 31 0 20 - 1 00 mg/dL    eGFR 29 ml/min/1 73sq m   Magnesium   Result Value Ref Range    Magnesium 2 7 (H) 1 6 - 2 6 mg/dL   Phosphorus   Result Value Ref Range    Phosphorus 3 4 2 7 - 4 5 mg/dL   Protein / creatinine ratio, urine   Result Value Ref Range    Creatinine, Ur 52 7 mg/dL    Protein Urine Random 165 mg/dL    Prot/Creat Ratio, Ur 3 13 (H) 0 00 - 0 10   Tacrolimus level   Result Value Ref Range    TACROLIMUS 5 2 2 0 - 20 0 ng/mL   Urinalysis with microscopic   Result Value Ref Range    Clarity, UA Cloudy     Color, UA Yellow     Specific Boody, UA 1 015 1 003 - 1 030    pH, UA 7 0 4 5 - 8 0    Glucose, UA Negative Negative mg/dl    Ketones, UA Negative Negative mg/dl    Blood, UA Negative Negative    Protein,  (2+) (A) Negative mg/dl    Nitrite, UA Positive (A) Negative    Bilirubin, UA Negative Negative    Urobilinogen, UA 0 2 0 2, 1 0 E U /dl E U /dl    Leukocytes, UA Large (A) Negative    WBC, UA Innumerable (A) None Seen, 0-5, 5-55, 5-65 /hpf    RBC, UA None Seen None Seen, 0-5 /hpf    Hyaline Casts, UA 5-10 (A) None Seen /lpf    Bacteria, UA Innumerable (A) None Seen, Occasional /hpf    Epithelial Cells None Seen None Seen, Occasional /hpf

## 2019-02-25 NOTE — PROGRESS NOTES
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, dCHF, aortic regurg, dCHF grade I, zoster in July, subclavian art stenosis on Left presents for follow-up visit for renal transplant     Patient has had multiple admissions and issues recently     9/2017 -acute kidney injury, urinary frequency   Treated initially for UTI      10/2017 -urinary incontinence   Was started on antibiotics for possible UTI   Was started on Bactrim prophylaxis      11/2017 -abdominal pain  Nausea   Confusion   Depression and anxiety   Patient had EGD which showed gastritis   Given volume expansion        2/2018-depression and anxiety   Treated for UTI also        07/2018-treated for urinary tract infection; also was seen in the ER on 07/23 for periorbital cellulitis     9/2018 - renal artery study at [facility] - patent renal artery and anastamosis that did not require intervention     1) Renal transplant - Baseline Cr 1 6 - 1 9 mg/dL  recurrent UTI/pyelo, recurrent episodes of EDUARDO due to volume depletion  BK negative  Has history of renal artery stenosis     - Cr upper end of baseline 1 94 in February 2019  - follow renal function closely  - advised to drink 2 L fluids daily  - has renal artery stenosis > 60%-s/p renal artery study without stenosis at [facility] with IR  - UA with positive nitrite, innumerable bacteria     - UPCR 1 62 --> rising 3 13 in february  - pt with no urinary symptoms today   - Overall, 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  Patient also has paraproteinemia which is likely contributing to proteinuria also  Patient may need repeat BM biopsy if worsening of renal function  Seeing Hematology soon  - if renal function worsens, may need repeat transplant eval at [facility], but the issues with paraproteinemia and co morbidities may prevent repeat renal transplant     2) Immunosuppression - given Hematological issues, goal tac lower end 4-5     - tac was reduced to 3 mg BID prior with goal tac 4-5  Currently level is 5 2 - no changes  - Continue prednisone 5 mg     3) HTN - history of orthostasis and low diast BP  widened pulse pressure possible due to aortic valve regurg? needs afterload reduction     - pt states now BP rarely goes above 170; mainly in 160s over 50s  - amlodipine 10 mg in AM and 5 mg in PM, clonidine 0 3 TID, doxazosin 3 mg nightly, metoprolol 50 BID, hydralazine 50 TID  -renal artery study with stenosis present - saw [facility] team and had IR procedure on 9/25/18 with CO2 study and 5 cc contrast utilized - the transplant artery is slightly tortuous but the artery was widely patent     4) Anemia -      - prior fec occ positive  - had EGD prior with gastritis  - iron sat low  - iron infusions started in June 2018  - prior to [facility] or [facility], will need to review with Hematology given history of paraproteinemia as above    - follow Hb     5) recurrent UTI/pyelo - as above; completed monosat       - monitor clinically  - pt's E coli has become resistant over time  - advised patient that patient needs to go to ER if develops urinary symptoms as has resistant bacteria     6) Vaccine - should stay up to date on innactivated vaccinations     -hold on taking shingrix vaccine for one year post shingles infection     7) Lipids - as per PCP with regards to lipid check     8) BMD - DEXA due 2017        - monitor for now  -follow PTH and phosphorus - last PTH 56 5     9) proteinuria -likely multifactorial due to advanced age of kidney/allograft nephropathy, paraproteinemia       8) age appropriate ca screening - needs to remain up to date with mammogram an colonoscopy (next c scope is in 8/2018)     - needs to f/u with Derm Yearly - pt will call to make appt  - awaiting repeat EGD with GI     11) depression - Patient states depression is well-controlled with the current regimen      - pt is now off sertraline  - pt is on 4-5 different anti psych/anti depressants  Pt will discuss with Psychiatry team      12) question of renal art stenosis - not evident on study     13) MGUS/IgG Kappa - follows with Hematology/Oncology      -patient may require chemotherapy in the near future  - repeat k/l chains on 10/10 was 3 09 and repeat January 2019 is 2 86, stable     14) Fatigue      15) hyponatremia - improved       16) pancreas transplant -      - failed pancreas allograft - following with Endocrine  - last A1c 5  - C peptide 1 7     17) Volume - relatively euvolemic, but has gained weight and trace edema LE     - cont furosemide     18) zoster     -zoster had second episode in december  - now resolved     19) aortic regurg - on ECHO July with grade I dCHF     20) subclavian art stenosis on L     21) acid/base - bicarb tabs were increased to three times/day     - continue for now  Bicarb slightly lower - recheck next month    22) hypermag - repeat    23) weight gain       It was a pleasure of evaluating Ms Josephine Steele in the renal office  Please do not hesitate to contact our team if further issues or questions arise in the interim       No problem-specific Assessment & Plan notes found for this encounter  HPI:    Pt denies new complaints  Had shingles in December 2018   BP at home 160/40s    PATIENT INSTRUCTIONS:    Patient Instructions   1) please wait one year from last shingles episode prior to receiving shingrix vaccine  2) complete labwork middle of march  3) appt in 2 months          OBJECTIVE:  Current Weight: Weight - Scale: 103 kg (228 lb)  Vitals:    02/25/19 1355   BP: 166/72   BP Location: Left arm   Patient Position: Sitting   Cuff Size: Large   Pulse: 66   Weight: 103 kg (228 lb)   Height: 5' 8" (1 727 m)    Body mass index is 34 67 kg/m²  REVIEW OF SYSTEMS:    Review of Systems   Constitutional: Negative  Negative for fatigue  HENT: Negative  Eyes: Negative  Respiratory: Negative  Negative for shortness of breath  Cardiovascular: Negative  Negative for leg swelling  Gastrointestinal: Negative  Endocrine: Negative  Genitourinary: Negative  Negative for difficulty urinating  Musculoskeletal: Negative  Skin: Negative  Allergic/Immunologic: Negative  Neurological: Negative  Hematological: Negative  Psychiatric/Behavioral: Negative  All other systems reviewed and are negative  PHYSICAL EXAM:      Physical Exam   Constitutional: She is oriented to person, place, and time  She appears well-developed and well-nourished  No distress  HENT:   Head: Normocephalic and atraumatic  Mouth/Throat: No oropharyngeal exudate  Eyes: Conjunctivae are normal  Right eye exhibits no discharge  Left eye exhibits no discharge  No scleral icterus  Neck: Normal range of motion  Neck supple  No JVD present  Cardiovascular: Normal rate and regular rhythm  Exam reveals no gallop and no friction rub  No murmur heard  Pulmonary/Chest: Effort normal and breath sounds normal  No respiratory distress  She has no wheezes  She has no rales  Abdominal: Soft  Bowel sounds are normal  She exhibits no distension  There is no tenderness  There is no rebound  Musculoskeletal: Normal range of motion  She exhibits no edema, tenderness or deformity  Neurological: She is alert and oriented to person, place, and time  Coordination normal    Skin: Skin is warm and dry  No rash noted  She is not diaphoretic  No erythema  No pallor  Psychiatric: She has a normal mood and affect   Her behavior is normal  Judgment and thought content normal    Nursing note and vitals reviewed  Medications:    Current Outpatient Medications:     amLODIPine (NORVASC) 10 mg tablet, TAKE 1 TABLET BY MOUTH EVERY MORNING AND 1/2 TABLET EVERY EVENING, Disp: 135 tablet, Rfl: 3    ARIPiprazole (ABILIFY) 30 mg tablet, Take 1 tablet (30 mg total) by mouth daily at bedtime for 180 days, Disp: 90 tablet, Rfl: 1    aspirin 81 MG tablet, Take 1 tablet by mouth daily, Disp: , Rfl:     busPIRone (BUSPAR) 5 mg tablet, Take 1 tablet (5 mg total) by mouth 2 (two) times a day for 180 days, Disp: 180 tablet, Rfl: 1    Cholecalciferol (VITAMIN D3) 1000 units CAPS, Take 3 capsules by mouth daily  , Disp: , Rfl:     cloNIDine (CATAPRES) 0 1 mg tablet, TAKE 3 TABLETS EVERY MORNING, THEN 2 TABLETS AT NOON, THEN 3 TABLETS EVERY EVENING (Patient taking differently: TAKE 3 TABLETS EVERY MORNING, THEN 3 TABLETS AT NOON, THEN 3 TABLETS EVERY EVENING), Disp: 720 tablet, Rfl: 4    doxazosin (CARDURA) 1 mg tablet, Take 2 tablets (2 mg total) by mouth daily at bedtime, Disp: 90 tablet, Rfl: 0    DULoxetine (CYMBALTA) 60 mg delayed release capsule, Take 1 capsule (60 mg total) by mouth 2 (two) times a day for 180 days, Disp: 180 capsule, Rfl: 1    folic acid (FOLVITE) 1 mg tablet, TAKE 1 TABLET BY MOUTH DAILY AS DIRECTED, Disp: 90 tablet, Rfl: 3    furosemide (LASIX) 20 mg tablet, Take 1 tablet (20 mg total) by mouth daily, Disp: 90 tablet, Rfl: 4    hydrALAZINE (APRESOLINE) 50 mg tablet, Take 1 tablet (50 mg total) by mouth 3 (three) times a day for 90 days, Disp: 270 tablet, Rfl: 3    Insulin Glargine (TOUJEO SOLOSTAR) 300 units/mL CONCETRATED U-300 injection pen, Inject 1 Units under the skin daily at bedtime, Disp: 4 pen, Rfl: 0    insulin lispro (HUMALOG KWIKPEN) 100 units/mL injection pen, Inject insulin per sliding scale (150-200=2u, 201-250=4u, 251-300=6u, 301-350=8u, >350=10u); patient uses 10 units daily  , Disp: 5 pen, Rfl: 0    Insulin Pen Needle (BD PEN NEEDLE VISHNU U/F) 32G X 4 MM MISC, Use 4 times daily, Disp: 400 each, Rfl: 1    Lancets (ONETOUCH ULTRASOFT) lancets, by Does not apply route 4 (four) times a day  , Disp: , Rfl:     levothyroxine 125 mcg tablet, TAKE 1 TABLET(125MCG TOTAL) BY MOUTH DAILY, Disp: 90 tablet, Rfl: 4    [START ON 3/27/2019] LORazepam (ATIVAN) 2 mg tablet, Take 1 tablet (2 mg total) by mouth 3 (three) times a day as needed for anxiety for up to 90 days To be filled on or after 3/27/19, Disp: 90 tablet, Rfl: 2    metoprolol tartrate (LOPRESSOR) 50 mg tablet, TAKE 1 TABLET(50MG TOTAL) BY MOUTH TWICE DAILY, Disp: 180 tablet, Rfl: 5    ONE TOUCH ULTRA TEST test strip, Use 4 times daily ( please dispense One touch Ultra test strips), Disp: 400 each, Rfl: 1    pravastatin (PRAVACHOL) 80 mg tablet, TAKE 1 TABLET BY MOUTH DAILY, Disp: 90 tablet, Rfl: 5    predniSONE 5 mg tablet, Take 1 tablet by mouth daily, Disp: , Rfl:     rOPINIRole (REQUIP) 0 25 mg tablet, TAKE 1 TABLET BY MOUTH TWICE DAILY, Disp: 180 tablet, Rfl: 1    sertraline (ZOLOFT) 100 mg tablet, Take 2 tablets (200 mg total) by mouth daily for 180 days, Disp: 180 tablet, Rfl: 1    sodium bicarbonate 650 mg tablet, Take 2 tablets (1,300 mg total) by mouth 3 (three) times a day, Disp: 180 tablet, Rfl: 5    tacrolimus (PROGRAF) 1 mg capsule, Take 3 mg by mouth 2 (two) times a day  , Disp: , Rfl:     Laboratory Results:  Results from last 7 days   Lab Units 02/20/19  0721   POTASSIUM mmol/L 4 2   CHLORIDE mmol/L 110*   CO2 mmol/L 20*   BUN mg/dL 37*   CREATININE mg/dL 1 94*   CALCIUM mg/dL 8 9   MAGNESIUM mg/dL 2 7*   PHOSPHORUS mg/dL 3 4       Results for orders placed or performed in visit on 02/08/19   Comprehensive metabolic panel   Result Value Ref Range    Sodium 139 136 - 145 mmol/L    Potassium 4 2 3 5 - 5 3 mmol/L    Chloride 110 (H) 100 - 108 mmol/L    CO2 20 (L) 21 - 32 mmol/L    ANION GAP 9 4 - 13 mmol/L    BUN 37 (H) 5 - 25 mg/dL    Creatinine 1 94 (H) 0 60 - 1 30 mg/dL    Glucose, Fasting 64 (L) 65 - 99 mg/dL    Calcium 8 9 8 3 - 10 1 mg/dL    AST 14 5 - 45 U/L    ALT 21 12 - 78 U/L    Alkaline Phosphatase 110 46 - 116 U/L    Total Protein 9 2 (H) 6 4 - 8 2 g/dL    Albumin 3 0 (L) 3 5 - 5 0 g/dL    Total Bilirubin 0 31 0 20 - 1 00 mg/dL    eGFR 29 ml/min/1 73sq m   Magnesium   Result Value Ref Range    Magnesium 2 7 (H) 1 6 - 2 6 mg/dL   Phosphorus   Result Value Ref Range    Phosphorus 3 4 2 7 - 4 5 mg/dL   Protein / creatinine ratio, urine   Result Value Ref Range    Creatinine, Ur 52 7 mg/dL    Protein Urine Random 165 mg/dL    Prot/Creat Ratio, Ur 3 13 (H) 0 00 - 0 10   Tacrolimus level   Result Value Ref Range    TACROLIMUS 5 2 2 0 - 20 0 ng/mL   Urinalysis with microscopic   Result Value Ref Range    Clarity, UA Cloudy     Color, UA Yellow     Specific [place], UA 1 015 1 003 - 1 030    pH, UA 7 0 4 5 - 8 0    Glucose, UA Negative Negative mg/dl    Ketones, UA Negative Negative mg/dl    Blood, UA Negative Negative    Protein,  (2+) (A) Negative mg/dl    Nitrite, UA Positive (A) Negative    Bilirubin, UA Negative Negative    Urobilinogen, UA 0 2 0 2, 1 0 E U /dl E U /dl    Leukocytes, UA Large (A) Negative    WBC, UA Innumerable (A) None Seen, 0-5, 5-55, 5-65 /hpf    RBC, UA None Seen None Seen, 0-5 /hpf    Hyaline Casts, UA 5-10 (A) None Seen /lpf    Bacteria, UA Innumerable (A) None Seen, Occasional /hpf    Epithelial Cells None Seen None Seen, Occasional /hpf

## 2019-02-25 NOTE — PATIENT INSTRUCTIONS
1) please wait one year from last shingles episode prior to receiving shingrix vaccine  2) complete labwork middle of march  3) appt in 2 months

## 2019-02-26 ENCOUNTER — TELEPHONE (OUTPATIENT)
Dept: FAMILY MEDICINE CLINIC | Facility: CLINIC | Age: 54
End: 2019-02-26

## 2019-02-26 DIAGNOSIS — E10.65 TYPE 1 DIABETES MELLITUS WITH HYPERGLYCEMIA (HCC): ICD-10-CM

## 2019-03-02 DIAGNOSIS — F33.1 MAJOR DEPRESSIVE DISORDER, RECURRENT EPISODE, MODERATE (HCC): Chronic | ICD-10-CM

## 2019-03-04 RX ORDER — ARIPIPRAZOLE 30 MG/1
TABLET ORAL
Qty: 90 TABLET | Refills: 0 | OUTPATIENT
Start: 2019-03-04

## 2019-03-06 ENCOUNTER — DOCUMENTATION (OUTPATIENT)
Dept: PSYCHIATRY | Facility: CLINIC | Age: 54
End: 2019-03-06

## 2019-03-07 ENCOUNTER — TELEPHONE (OUTPATIENT)
Dept: ENDOCRINOLOGY | Facility: CLINIC | Age: 54
End: 2019-03-07

## 2019-03-07 NOTE — TELEPHONE ENCOUNTER
Please inform patient to continue current regimen  Blood sugars reviewed, they are in mostly acceptable range    Alayna De Jesus MD

## 2019-03-12 ENCOUNTER — OFFICE VISIT (OUTPATIENT)
Dept: HEMATOLOGY ONCOLOGY | Facility: CLINIC | Age: 54
End: 2019-03-12
Payer: MEDICARE

## 2019-03-12 VITALS
TEMPERATURE: 97.1 F | HEIGHT: 68 IN | OXYGEN SATURATION: 96 % | BODY MASS INDEX: 34.86 KG/M2 | WEIGHT: 230 LBS | SYSTOLIC BLOOD PRESSURE: 150 MMHG | DIASTOLIC BLOOD PRESSURE: 72 MMHG | HEART RATE: 65 BPM | RESPIRATION RATE: 16 BRPM

## 2019-03-12 DIAGNOSIS — C90.00 INDOLENT MULTIPLE MYELOMA (HCC): Primary | ICD-10-CM

## 2019-03-12 PROCEDURE — 99214 OFFICE O/P EST MOD 30 MIN: CPT | Performed by: INTERNAL MEDICINE

## 2019-03-12 NOTE — PROGRESS NOTES
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 showed 15-20% plasma cells, normal fish panel for myeloma as well as normal cytogenetics, worsening renal insufficiency, evaluated for 2nd opinion and the decision to continue watchful observation at [facility]    I believe the patient needs to be treated for multiple myeloma    Especially with recurrence of herpes zoster, worsening renal insufficiency, I will order serum protein electrophoresis, free light chain, quantitative immunoglobulins, beta 2 microglobulin    2  I told the patient we need a bone marrow biopsy however she was his stent again depends on the serum protein electrophoresis and free light chain we might need to obtain a bone marrow biopsy    3  Because of neuropathy, herpes zoster I believe treatment with Ixazomib or carfilzomib based regimen is a good idea or even Daratumumab    4  Status post kidney transplant and pancreatic transplant at [address] secondary to diabetic nephropathy and pancreatic insufficiency    5   Follow-up in 2 weeks           HPI:  50-year-old  female with diabetes mellitus type 1, diabetic neuropathy, diabetic nephropathy, status post kidney and pancreatic transplant in 1998 at [address], amputation of the right big toe, cholecystectomy, vitamin-D deficiency, with exacerbation diabetes mellitus, and possible pancreatic burn out, workup was found to have abnormal serum protein electrophoresis in August 2017 with IgG kappa, the 1st peak of 0 54 grams/deciliter and the 2nd peak of 2 27 grams/deciliter, mildly elevated kappa light chain, chronic kidney disease, a bone marrow biopsy showed 15-20% plasma cells, normal cytogenetics and normal FISH panel for myeloma, skeletal survey showed no evidence of lytic lesion, she was diagnosed with smoldering multiple myeloma   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, she was evaluated by Dr Valentina Esqueda at [facility] the decision to continue watchful observation  She has diabetic neuropathy grade 3, diabetes mellitus in insulin    Recently she had herpes zoster in February 2019 treated accordingly          ECOG score 1        Previous Treatment:         Test Results:    Imaging: No results found  Labs:   Lab Results   Component Value Date    WBC 7 07 01/24/2019    HGB 10 2 (L) 01/24/2019    HCT 33 8 (L) 01/24/2019     (H) 01/24/2019     01/24/2019     Lab Results   Component Value Date     (L) 01/06/2016    K 4 2 02/20/2019     (H) 02/20/2019    CO2 20 (L) 02/20/2019    ANIONGAP 6 01/06/2016    BUN 37 (H) 02/20/2019    CREATININE 1 94 (H) 02/20/2019    GLUCOSE 103 09/13/2017    GLUF 64 (L) 02/20/2019    CALCIUM 8 9 02/20/2019    CORRECTEDCA 11 7 (H) 08/28/2018    AST 14 02/20/2019    ALT 21 02/20/2019    ALKPHOS 110 02/20/2019    PROT 8 3 (H) 12/14/2015    BILITOT 0 27 12/14/2015    EGFR 29 02/20/2019       Lab Results   Component Value Date    IRON 52 01/24/2019    TIBC 249 (L) 01/24/2019    FERRITIN 42 01/24/2019       Lab Results   Component Value Date    [id number] 438 07/14/2014         ROS:   Review of Systems   Constitutional: Positive for fatigue  Negative for activity change, appetite change, diaphoresis, fever and unexpected weight change  HENT: Negative for facial swelling, hearing loss, rhinorrhea, sinus pressure, sinus pain, sneezing, sore throat and tinnitus  Eyes: Negative for photophobia, pain, discharge, redness, itching and visual disturbance  Respiratory: Negative for apnea and chest tightness  Cardiovascular: Negative for chest pain, palpitations and leg swelling     Gastrointestinal: Negative for abdominal distention, abdominal pain, blood in stool, constipation, diarrhea, nausea, rectal pain and vomiting  Endocrine: Negative for cold intolerance, heat intolerance, polydipsia and polyphagia  Genitourinary: Negative for difficulty urinating, dyspareunia, frequency, hematuria, pelvic pain and urgency  Musculoskeletal: Negative for arthralgias, back pain, gait problem, joint swelling and myalgias  Skin: Negative for color change, pallor and rash  Allergic/Immunologic: Negative for environmental allergies and food allergies  Neurological: Positive for tremors and numbness  Negative for dizziness, seizures, syncope, speech difficulty and headaches  Hematological: Negative for adenopathy  Does not bruise/bleed easily  Psychiatric/Behavioral: Negative for agitation, confusion, dysphoric mood, hallucinations and suicidal ideas  Current Medications: Reviewed  Allergies: Reviewed  PMH/FH/SH:  Reviewed      Physical Exam:    Body surface area is 2 17 meters squared  Wt Readings from Last 3 Encounters:   03/12/19 104 kg (230 lb)   02/25/19 103 kg (228 lb)   02/08/19 105 kg (230 lb 12 8 oz)        Temp Readings from Last 3 Encounters:   03/12/19 (!) 97 1 °F (36 2 °C) (Tympanic)   12/27/18 97 7 °F (36 5 °C) (Tympanic)   11/26/18 97 8 °F (36 6 °C) (Oral)        BP Readings from Last 3 Encounters:   03/12/19 150/72   02/25/19 166/72   02/08/19 (!) 196/70         Pulse Readings from Last 3 Encounters:   03/12/19 65   02/25/19 66   02/08/19 76        Physical Exam   Constitutional: She is oriented to person, place, and time  She appears well-developed and well-nourished  No distress  HENT:   Head: Normocephalic and atraumatic  Mouth/Throat: Oropharynx is clear and moist  No oropharyngeal exudate  Eyes: Pupils are equal, round, and reactive to light  Conjunctivae and EOM are normal    Neck: Normal range of motion  Neck supple  No tracheal deviation present  No thyromegaly present  Cardiovascular: Normal rate and regular rhythm  Exam reveals no gallop and no friction rub     No murmur heard   Pulmonary/Chest: Effort normal and breath sounds normal  No respiratory distress  She has no wheezes  She has no rales  She exhibits no tenderness  Abdominal: Soft  Bowel sounds are normal  She exhibits no distension and no mass  There is no tenderness  There is no rebound and no guarding  Musculoskeletal: Normal range of motion  She exhibits edema (Plus one edema bilaterally)  Lymphadenopathy:     She has no cervical adenopathy  Neurological: She is alert and oriented to person, place, and time  Skin: Skin is warm and dry  No rash noted  She is not diaphoretic  No erythema  No pallor  Psychiatric: She has a normal mood and affect  Her behavior is normal  Judgment and thought content normal    Vitals reviewed  Goals and Barriers:  Current Goal: Minimize effects of disease  Barriers: None  Patient's Capacity to Self Care:  Patient is able to self care      Code Status: @[facility]@

## 2019-03-13 ENCOUNTER — TELEPHONE (OUTPATIENT)
Dept: NEPHROLOGY | Facility: CLINIC | Age: 54
End: 2019-03-13

## 2019-03-13 ENCOUNTER — APPOINTMENT (OUTPATIENT)
Dept: LAB | Age: 54
End: 2019-03-13
Payer: MEDICARE

## 2019-03-13 DIAGNOSIS — C90.00 INDOLENT MULTIPLE MYELOMA (HCC): ICD-10-CM

## 2019-03-13 LAB
BASOPHILS # BLD AUTO: 0.06 THOUSANDS/ΜL (ref 0–0.1)
BASOPHILS NFR BLD AUTO: 1 % (ref 0–1)
EOSINOPHIL # BLD AUTO: 0.28 THOUSAND/ΜL (ref 0–0.61)
EOSINOPHIL NFR BLD AUTO: 4 % (ref 0–6)
ERYTHROCYTE [DISTWIDTH] IN BLOOD BY AUTOMATED COUNT: 15.8 % (ref 11.6–15.1)
HCT VFR BLD AUTO: 31.8 % (ref 34.8–46.1)
HGB BLD-MCNC: 9.7 G/DL (ref 11.5–15.4)
IGA SERPL-MCNC: 62 MG/DL (ref 70–400)
IGG SERPL-MCNC: 2920 MG/DL (ref 700–1600)
IGM SERPL-MCNC: 31 MG/DL (ref 40–230)
IMM GRANULOCYTES # BLD AUTO: 0.04 THOUSAND/UL (ref 0–0.2)
IMM GRANULOCYTES NFR BLD AUTO: 1 % (ref 0–2)
LYMPHOCYTES # BLD AUTO: 1.13 THOUSANDS/ΜL (ref 0.6–4.47)
LYMPHOCYTES NFR BLD AUTO: 16 % (ref 14–44)
MCH RBC QN AUTO: 29.7 PG (ref 26.8–34.3)
MCHC RBC AUTO-ENTMCNC: 30.5 G/DL (ref 31.4–37.4)
MCV RBC AUTO: 97 FL (ref 82–98)
MONOCYTES # BLD AUTO: 0.34 THOUSAND/ΜL (ref 0.17–1.22)
MONOCYTES NFR BLD AUTO: 5 % (ref 4–12)
NEUTROPHILS # BLD AUTO: 5.08 THOUSANDS/ΜL (ref 1.85–7.62)
NEUTS SEG NFR BLD AUTO: 73 % (ref 43–75)
NRBC BLD AUTO-RTO: 0 /100 WBCS
PLATELET # BLD AUTO: 193 THOUSANDS/UL (ref 149–390)
PMV BLD AUTO: 12.8 FL (ref 8.9–12.7)
RBC # BLD AUTO: 3.27 MILLION/UL (ref 3.81–5.12)
WBC # BLD AUTO: 6.93 THOUSAND/UL (ref 4.31–10.16)

## 2019-03-13 PROCEDURE — 83883 ASSAY NEPHELOMETRY NOT SPEC: CPT

## 2019-03-13 PROCEDURE — 85025 COMPLETE CBC W/AUTO DIFF WBC: CPT

## 2019-03-13 PROCEDURE — 84165 PROTEIN E-PHORESIS SERUM: CPT | Performed by: PATHOLOGY

## 2019-03-13 PROCEDURE — 84165 PROTEIN E-PHORESIS SERUM: CPT

## 2019-03-13 PROCEDURE — 82232 ASSAY OF BETA-2 PROTEIN: CPT

## 2019-03-13 PROCEDURE — 82784 ASSAY IGA/IGD/IGG/IGM EACH: CPT

## 2019-03-13 NOTE — TELEPHONE ENCOUNTER
----- Message from Shahid Baumann MD sent at 3/13/2019 12:11 PM EDT -----  Hello    Patient normally is followed up by Ms Wolf Healy  Can we make sure pt does not have any urinary complaints?  - renal function on upper end of nl   Electrolytes stable  - tacrolimus pending  - no changes for now    Thank you    np

## 2019-03-14 LAB
B2 MICROGLOB SERPL-MCNC: 5.3 MG/L (ref 0.6–2.4)
KAPPA LC FREE SER-MCNC: 64.9 MG/L (ref 3.3–19.4)
KAPPA LC FREE/LAMBDA FREE SER: 3.61 {RATIO} (ref 0.26–1.65)
LAMBDA LC FREE SERPL-MCNC: 18 MG/L (ref 5.7–26.3)

## 2019-03-15 LAB
ALBUMIN SERPL ELPH-MCNC: 3.81 G/DL (ref 3.5–5)
ALBUMIN SERPL ELPH-MCNC: 43.8 % (ref 52–65)
ALPHA1 GLOB SERPL ELPH-MCNC: 0.33 G/DL (ref 0.1–0.4)
ALPHA1 GLOB SERPL ELPH-MCNC: 3.8 % (ref 2.5–5)
ALPHA2 GLOB SERPL ELPH-MCNC: 0.73 G/DL (ref 0.4–1.2)
ALPHA2 GLOB SERPL ELPH-MCNC: 8.4 % (ref 7–13)
BETA GLOB ABNORMAL SERPL ELPH-MCNC: 0.36 G/DL (ref 0.4–0.8)
BETA1 GLOB SERPL ELPH-MCNC: 4.1 % (ref 5–13)
BETA2 GLOB SERPL ELPH-MCNC: 10.3 % (ref 2–8)
BETA2+GAMMA GLOB SERPL ELPH-MCNC: 0.9 G/DL (ref 0.2–0.5)
GAMMA GLOB ABNORMAL SERPL ELPH-MCNC: 2.58 G/DL (ref 0.5–1.6)
GAMMA GLOB SERPL ELPH-MCNC: 29.6 % (ref 12–22)
IGG/ALB SER: 0.78 {RATIO} (ref 1.1–1.8)
M PEAK ID 2: 28.8 %
M PROTEIN 1 MFR SERPL ELPH: 7.4 %
M PROTEIN 1 SERPL ELPH-MCNC: 0.64 G/DL
M PROTEIN 2 SERPL ELPH-MCNC: 2.51 G/DL
PROT PATTERN SERPL ELPH-IMP: ABNORMAL
PROT SERPL-MCNC: 8.7 G/DL (ref 6.4–8.2)

## 2019-03-19 DIAGNOSIS — F41.1 GAD (GENERALIZED ANXIETY DISORDER): ICD-10-CM

## 2019-03-19 DIAGNOSIS — F33.1 MAJOR DEPRESSIVE DISORDER, RECURRENT EPISODE, MODERATE (HCC): Chronic | ICD-10-CM

## 2019-03-19 RX ORDER — DULOXETIN HYDROCHLORIDE 60 MG/1
CAPSULE, DELAYED RELEASE ORAL
Qty: 180 CAPSULE | Refills: 0 | OUTPATIENT
Start: 2019-03-19

## 2019-03-26 ENCOUNTER — OFFICE VISIT (OUTPATIENT)
Dept: FAMILY MEDICINE CLINIC | Facility: CLINIC | Age: 54
End: 2019-03-26
Payer: MEDICARE

## 2019-03-26 VITALS
HEIGHT: 68 IN | HEART RATE: 66 BPM | WEIGHT: 225.4 LBS | TEMPERATURE: 98.4 F | OXYGEN SATURATION: 95 % | RESPIRATION RATE: 20 BRPM | SYSTOLIC BLOOD PRESSURE: 140 MMHG | BODY MASS INDEX: 34.16 KG/M2 | DIASTOLIC BLOOD PRESSURE: 78 MMHG

## 2019-03-26 DIAGNOSIS — C90.00 INDOLENT MULTIPLE MYELOMA (HCC): ICD-10-CM

## 2019-03-26 DIAGNOSIS — L01.00 IMPETIGO: Primary | ICD-10-CM

## 2019-03-26 PROCEDURE — 99213 OFFICE O/P EST LOW 20 MIN: CPT | Performed by: FAMILY MEDICINE

## 2019-03-26 RX ORDER — MUPIROCIN CALCIUM 20 MG/G
CREAM TOPICAL 3 TIMES DAILY
Qty: 15 G | Refills: 1 | Status: SHIPPED | OUTPATIENT
Start: 2019-03-26 | End: 2019-06-05 | Stop reason: ALTCHOICE

## 2019-03-26 NOTE — PROGRESS NOTES
Chief Complaint   Patient presents with    Rash     Chest, mouth and scalp x3 days     Health Maintenance   Topic Date Due    Medicare Annual Wellness Visit (AWV)  1965    BMI: Followup Plan  03/08/1982    HEPATITIS B VACCINES (1 of 3 - Risk 3-dose series) 03/08/1983    DTaP,Tdap,and Td Vaccines (1 - Tdap) 03/08/1985    PAP SMEAR  03/08/1985    Diabetic Foot Exam  06/22/2018    Pneumococcal PPSV23 Highest Risk Adult (3 of 3 - PCV13) 10/06/2018    HEMOGLOBIN A1C  08/05/2019    DXA SCAN  10/17/2019    DM Eye Exam  12/13/2019    URINE MICROALBUMIN  02/05/2020    BMI: Adult  03/12/2020    MAMMOGRAM  06/28/2020    CRC Screening: Colonoscopy  08/07/2023    Hepatitis C Screening  Completed    INFLUENZA VACCINE  Completed     Assessment/Plan:    Impetigo  Apply Bactroban cream 3 times daily to affected areas  Recommended to call if scabs/crusty lesions are not healing, will start oral antibiotic therapy with Cephalexin  Consider dermatology evaluation  Indolent multiple myeloma ([address] )  Management per hematology-oncology Dr Navdeep Matos  Diagnoses and all orders for this visit:    Impetigo  -     mupirocin (BACTROBAN) 2 % cream; Apply topically 3 (three) times a day    Indolent multiple myeloma ([address] )          Subjective:      Patient ID: Chucky Rueda is a 53 y o  female  HPI     Patient  c/o crusty scabbed skin lesions on anterior chest area, at the corner of her mouth, right hand and one lesion on R parietal scalp  No skin oozing, drainage  Denies fever, chills  Patient has multiple myeloma  She has been followed by hematology- oncology Dr Navdeep Matos, last seen on 3/12/19  Currently not on chemotherapy  PMHx: HTN, Hyperlipidemia,  AI, CKD stage 4, H/o kidney and pancreatic transplant in 1998, Hypothyroidism, Type 1 DM, diabetic neuropathy,  Anemia, Depression, Anxiety  Patient reports no recent changes in medications       The following portions of the patient's history were reviewed and updated as appropriate: allergies, past family history, past medical history, past social history, past surgical history and problem list     Review of Systems   Constitutional: Positive for fatigue  Negative for activity change, appetite change, chills and fever  HENT: Negative for congestion, ear pain, nosebleeds, sore throat and trouble swallowing  Eyes: Negative for pain, discharge, redness, itching and visual disturbance  Respiratory: Negative for cough, chest tightness, shortness of breath and wheezing  Cardiovascular: Negative for chest pain, palpitations and leg swelling  Gastrointestinal: Negative for abdominal pain, nausea and vomiting  Musculoskeletal: Positive for arthralgias and gait problem (ambulates with a cane)  Skin:        See HPI   Neurological: Negative for dizziness and headaches  Hematological: Negative for adenopathy  Bruises/bleeds easily  Objective:      /78 (BP Location: Left arm, Patient Position: Sitting, Cuff Size: Large)   Pulse 66   Temp 98 4 °F (36 9 °C) (Tympanic)   Resp 20   Ht 5' 8" (1 727 m)   Wt 102 kg (225 lb 6 4 oz)   LMP  (LMP Unknown)   SpO2 95%   BMI 34 27 kg/m²          Physical Exam   Constitutional: She appears well-developed and well-nourished  HENT:   Head: Normocephalic and atraumatic  Right Ear: External ear normal    Left Ear: External ear normal    Mouth/Throat: Oropharynx is clear and moist    Eyes: Pupils are equal, round, and reactive to light  Conjunctivae are normal    Cardiovascular: Normal rate and regular rhythm  Murmur (soft murmur at R USB, L USB) heard  No BL LE edema   Pulmonary/Chest: Effort normal and breath sounds normal    Abdominal: Soft  Bowel sounds are normal  There is no tenderness  Musculoskeletal:   Patient ambulates with a cane  No joint swelling or tenderness  Skin: Skin is warm and dry  Crusty scab on R hand, R parietal scalp    3 crusty skin lesions on anterior chest and small crusty scabs at the corner of her moth BL  Psychiatric: She has a normal mood and affect  Vitals reviewed

## 2019-03-26 NOTE — ASSESSMENT & PLAN NOTE
Apply Bactroban cream 3 times daily to affected areas  Recommended to call if scabs/crusty lesions are not healing, will start oral antibiotic therapy with Cephalexin  Consider dermatology evaluation

## 2019-04-03 ENCOUNTER — TELEPHONE (OUTPATIENT)
Dept: ENDOCRINOLOGY | Facility: CLINIC | Age: 54
End: 2019-04-03

## 2019-04-05 ENCOUNTER — OFFICE VISIT (OUTPATIENT)
Dept: HEMATOLOGY ONCOLOGY | Facility: CLINIC | Age: 54
End: 2019-04-05
Payer: MEDICARE

## 2019-04-05 ENCOUNTER — TELEPHONE (OUTPATIENT)
Dept: OTHER | Facility: HOSPITAL | Age: 54
End: 2019-04-05

## 2019-04-05 VITALS
SYSTOLIC BLOOD PRESSURE: 130 MMHG | HEIGHT: 68 IN | DIASTOLIC BLOOD PRESSURE: 70 MMHG | TEMPERATURE: 97.6 F | OXYGEN SATURATION: 98 % | WEIGHT: 228 LBS | BODY MASS INDEX: 34.56 KG/M2 | RESPIRATION RATE: 16 BRPM | HEART RATE: 64 BPM

## 2019-04-05 DIAGNOSIS — I10 BENIGN ESSENTIAL HTN: ICD-10-CM

## 2019-04-05 DIAGNOSIS — C90.00 INDOLENT MULTIPLE MYELOMA (HCC): Primary | ICD-10-CM

## 2019-04-05 PROCEDURE — 99213 OFFICE O/P EST LOW 20 MIN: CPT | Performed by: INTERNAL MEDICINE

## 2019-04-08 DIAGNOSIS — I10 BENIGN ESSENTIAL HTN: ICD-10-CM

## 2019-04-08 RX ORDER — AMLODIPINE BESYLATE 10 MG/1
TABLET ORAL
Qty: 135 TABLET | Refills: 0 | Status: ON HOLD | OUTPATIENT
Start: 2019-04-08 | End: 2020-01-23 | Stop reason: SDUPTHER

## 2019-04-08 RX ORDER — AMLODIPINE BESYLATE 10 MG/1
TABLET ORAL
Qty: 45 TABLET | Refills: 0 | Status: SHIPPED | OUTPATIENT
Start: 2019-04-08 | End: 2019-04-08 | Stop reason: SDUPTHER

## 2019-04-10 ENCOUNTER — TRANSCRIBE ORDERS (OUTPATIENT)
Dept: ADMINISTRATIVE | Age: 54
End: 2019-04-10

## 2019-04-10 ENCOUNTER — LAB (OUTPATIENT)
Dept: LAB | Age: 54
End: 2019-04-10
Payer: MEDICARE

## 2019-04-10 ENCOUNTER — TELEPHONE (OUTPATIENT)
Dept: ENDOCRINOLOGY | Facility: CLINIC | Age: 54
End: 2019-04-10

## 2019-04-10 DIAGNOSIS — I51.9 MYXEDEMA HEART DISEASE: ICD-10-CM

## 2019-04-10 DIAGNOSIS — I51.9 MYXEDEMA HEART DISEASE: Primary | ICD-10-CM

## 2019-04-10 DIAGNOSIS — Z94.0 RENAL TRANSPLANT RECIPIENT: ICD-10-CM

## 2019-04-10 DIAGNOSIS — D89.2 CRYOFIBRINOGENEMIA: Primary | ICD-10-CM

## 2019-04-10 DIAGNOSIS — E03.9 MYXEDEMA HEART DISEASE: ICD-10-CM

## 2019-04-10 DIAGNOSIS — D89.2 PARAPROTEINEMIA: ICD-10-CM

## 2019-04-10 DIAGNOSIS — N18.4 CHRONIC KIDNEY DISEASE, STAGE IV (SEVERE) (HCC): ICD-10-CM

## 2019-04-10 DIAGNOSIS — N18.4 CKD (CHRONIC KIDNEY DISEASE), STAGE IV (HCC): ICD-10-CM

## 2019-04-10 DIAGNOSIS — D89.2 CRYOFIBRINOGENEMIA: ICD-10-CM

## 2019-04-10 DIAGNOSIS — E03.9 MYXEDEMA HEART DISEASE: Primary | ICD-10-CM

## 2019-04-10 LAB
CREAT UR-MCNC: 46.4 MG/DL
MAGNESIUM SERPL-MCNC: 2.5 MG/DL (ref 1.6–2.6)
PROT UR-MCNC: 128 MG/DL
PROT/CREAT UR: 2.76 MG/G{CREAT} (ref 0–0.1)
T4 FREE SERPL-MCNC: 0.86 NG/DL (ref 0.76–1.46)
TSH SERPL DL<=0.05 MIU/L-ACNC: 2.44 UIU/ML (ref 0.36–3.74)

## 2019-04-10 PROCEDURE — 82570 ASSAY OF URINE CREATININE: CPT | Performed by: INTERNAL MEDICINE

## 2019-04-10 PROCEDURE — 84443 ASSAY THYROID STIM HORMONE: CPT

## 2019-04-10 PROCEDURE — 84165 PROTEIN E-PHORESIS SERUM: CPT | Performed by: PATHOLOGY

## 2019-04-10 PROCEDURE — 83883 ASSAY NEPHELOMETRY NOT SPEC: CPT

## 2019-04-10 PROCEDURE — 36415 COLL VENOUS BLD VENIPUNCTURE: CPT

## 2019-04-10 PROCEDURE — 83735 ASSAY OF MAGNESIUM: CPT

## 2019-04-10 PROCEDURE — 84439 ASSAY OF FREE THYROXINE: CPT

## 2019-04-10 PROCEDURE — 84156 ASSAY OF PROTEIN URINE: CPT | Performed by: INTERNAL MEDICINE

## 2019-04-10 PROCEDURE — 84165 PROTEIN E-PHORESIS SERUM: CPT

## 2019-04-11 LAB
KAPPA LC FREE SER-MCNC: 69.6 MG/L (ref 3.3–19.4)
KAPPA LC FREE/LAMBDA FREE SER: 4.05 {RATIO} (ref 0.26–1.65)
LAMBDA LC FREE SERPL-MCNC: 17.2 MG/L (ref 5.7–26.3)

## 2019-04-12 LAB
ALBUMIN SERPL ELPH-MCNC: 3.49 G/DL (ref 3.5–5)
ALBUMIN SERPL ELPH-MCNC: 40.1 % (ref 52–65)
ALPHA1 GLOB SERPL ELPH-MCNC: 0.36 G/DL (ref 0.1–0.4)
ALPHA1 GLOB SERPL ELPH-MCNC: 4.1 % (ref 2.5–5)
ALPHA2 GLOB SERPL ELPH-MCNC: 0.86 G/DL (ref 0.4–1.2)
ALPHA2 GLOB SERPL ELPH-MCNC: 9.9 % (ref 7–13)
BETA GLOB ABNORMAL SERPL ELPH-MCNC: 0.38 G/DL (ref 0.4–0.8)
BETA1 GLOB SERPL ELPH-MCNC: 4.4 % (ref 5–13)
BETA2 GLOB SERPL ELPH-MCNC: 7.8 % (ref 2–8)
BETA2+GAMMA GLOB SERPL ELPH-MCNC: 0.68 G/DL (ref 0.2–0.5)
GAMMA GLOB ABNORMAL SERPL ELPH-MCNC: 2.93 G/DL (ref 0.5–1.6)
GAMMA GLOB SERPL ELPH-MCNC: 33.7 % (ref 12–22)
IGG/ALB SER: 0.67 {RATIO} (ref 1.1–1.8)
M PEAK ID 2: 30.7 %
M PROTEIN 1 MFR SERPL ELPH: 5.8 %
M PROTEIN 1 SERPL ELPH-MCNC: 0.5 G/DL
M PROTEIN 2 SERPL ELPH-MCNC: 2.67 G/DL
PROT PATTERN SERPL ELPH-IMP: ABNORMAL
PROT SERPL-MCNC: 8.7 G/DL (ref 6.4–8.2)

## 2019-04-16 ENCOUNTER — TELEPHONE (OUTPATIENT)
Dept: HEMATOLOGY ONCOLOGY | Facility: CLINIC | Age: 54
End: 2019-04-16

## 2019-04-17 ENCOUNTER — HOSPITAL ENCOUNTER (OUTPATIENT)
Dept: RADIOLOGY | Facility: HOSPITAL | Age: 54
Discharge: HOME | End: 2019-04-17
Attending: RADIOLOGY | Admitting: RADIOLOGY
Payer: MEDICARE

## 2019-04-17 VITALS
OXYGEN SATURATION: 95 % | WEIGHT: 216 LBS | DIASTOLIC BLOOD PRESSURE: 97 MMHG | RESPIRATION RATE: 18 BRPM | BODY MASS INDEX: 33.9 KG/M2 | SYSTOLIC BLOOD PRESSURE: 182 MMHG | TEMPERATURE: 97.5 F | HEIGHT: 67 IN | HEART RATE: 71 BPM

## 2019-04-17 DIAGNOSIS — C90.00 INDOLENT MULTIPLE MYELOMA (HCC): ICD-10-CM

## 2019-04-17 PROCEDURE — 88365 INSITU HYBRIDIZATION (FISH): CPT | Performed by: PATHOLOGY

## 2019-04-17 PROCEDURE — 77012 CT SCAN FOR NEEDLE BIOPSY: CPT

## 2019-04-17 PROCEDURE — 88237 TISSUE CULTURE BONE MARROW: CPT | Performed by: INTERNAL MEDICINE

## 2019-04-17 PROCEDURE — 88188 FLOWCYTOMETRY/READ 9-15: CPT | Performed by: PATHOLOGY

## 2019-04-17 PROCEDURE — 88313 SPECIAL STAINS GROUP 2: CPT | Performed by: PATHOLOGY

## 2019-04-17 PROCEDURE — 88342 IMHCHEM/IMCYTCHM 1ST ANTB: CPT | Performed by: PATHOLOGY

## 2019-04-17 PROCEDURE — 88262 CHROMOSOME ANALYSIS 15-20: CPT | Performed by: INTERNAL MEDICINE

## 2019-04-17 PROCEDURE — 88311 DECALCIFY TISSUE: CPT | Performed by: PATHOLOGY

## 2019-04-17 PROCEDURE — 38222 DX BONE MARROW BX & ASPIR: CPT

## 2019-04-17 PROCEDURE — NC001 PR NO CHARGE: Performed by: RADIOLOGY

## 2019-04-17 PROCEDURE — 99152 MOD SED SAME PHYS/QHP 5/>YRS: CPT

## 2019-04-17 PROCEDURE — 85097 BONE MARROW INTERPRETATION: CPT | Performed by: PATHOLOGY

## 2019-04-17 PROCEDURE — 88305 TISSUE EXAM BY PATHOLOGIST: CPT | Performed by: PATHOLOGY

## 2019-04-17 PROCEDURE — 88184 FLOWCYTOMETRY/ TC 1 MARKER: CPT | Performed by: INTERNAL MEDICINE

## 2019-04-17 PROCEDURE — 88367 INSITU HYBRIDIZATION AUTO: CPT | Performed by: INTERNAL MEDICINE

## 2019-04-17 PROCEDURE — 88374 M/PHMTRC ALYS ISHQUANT/SEMIQ: CPT

## 2019-04-17 PROCEDURE — 88341 IMHCHEM/IMCYTCHM EA ADD ANTB: CPT | Performed by: PATHOLOGY

## 2019-04-17 PROCEDURE — 88373 M/PHMTRC ALYS ISHQUANT/SEMIQ: CPT

## 2019-04-17 PROCEDURE — 88185 FLOWCYTOMETRY/TC ADD-ON: CPT

## 2019-04-17 RX ORDER — SODIUM CHLORIDE 9 MG/ML
75 INJECTION, SOLUTION INTRAVENOUS CONTINUOUS
Status: DISCONTINUED | OUTPATIENT
Start: 2019-04-17 | End: 2019-04-17 | Stop reason: HOSPADM

## 2019-04-17 RX ORDER — CLINDAMYCIN HYDROCHLORIDE 300 MG/1
300 CAPSULE ORAL 4 TIMES DAILY
COMMUNITY
End: 2019-08-19

## 2019-04-17 RX ORDER — MAGNESIUM CHLORIDE 64 MG
TABLET, DELAYED RELEASE (ENTERIC COATED) ORAL 4 TIMES DAILY
COMMUNITY
End: 2019-06-18

## 2019-04-17 RX ORDER — SOLIFENACIN SUCCINATE 5 MG/1
10 TABLET, FILM COATED ORAL DAILY
COMMUNITY
End: 2019-06-05 | Stop reason: ALTCHOICE

## 2019-04-17 RX ORDER — FENTANYL CITRATE 50 UG/ML
INJECTION, SOLUTION INTRAMUSCULAR; INTRAVENOUS CODE/TRAUMA/SEDATION MEDICATION
Status: COMPLETED | OUTPATIENT
Start: 2019-04-17 | End: 2019-04-17

## 2019-04-17 RX ORDER — MIDAZOLAM HYDROCHLORIDE 1 MG/ML
INJECTION INTRAMUSCULAR; INTRAVENOUS CODE/TRAUMA/SEDATION MEDICATION
Status: COMPLETED | OUTPATIENT
Start: 2019-04-17 | End: 2019-04-17

## 2019-04-17 RX ADMIN — MIDAZOLAM 1 MG: 1 INJECTION INTRAMUSCULAR; INTRAVENOUS at 10:46

## 2019-04-17 RX ADMIN — FENTANYL CITRATE 50 MCG: 50 INJECTION, SOLUTION INTRAMUSCULAR; INTRAVENOUS at 10:40

## 2019-04-17 RX ADMIN — FENTANYL CITRATE 50 MCG: 50 INJECTION, SOLUTION INTRAMUSCULAR; INTRAVENOUS at 10:46

## 2019-04-17 RX ADMIN — SODIUM CHLORIDE 75 ML/HR: 0.9 INJECTION, SOLUTION INTRAVENOUS at 09:11

## 2019-04-17 RX ADMIN — FENTANYL CITRATE 50 MCG: 50 INJECTION, SOLUTION INTRAMUSCULAR; INTRAVENOUS at 10:55

## 2019-04-17 RX ADMIN — MIDAZOLAM 1 MG: 1 INJECTION INTRAMUSCULAR; INTRAVENOUS at 10:40

## 2019-04-18 DIAGNOSIS — E87.2 ACIDEMIA: ICD-10-CM

## 2019-04-18 RX ORDER — SODIUM BICARBONATE 650 MG/1
TABLET ORAL
Qty: 180 TABLET | Refills: 0 | Status: SHIPPED | OUTPATIENT
Start: 2019-04-18 | End: 2019-05-26 | Stop reason: SDUPTHER

## 2019-04-19 LAB — SCAN RESULT: NORMAL

## 2019-04-23 LAB — MISCELLANEOUS LAB TEST RESULT: NORMAL

## 2019-04-29 LAB — SCAN RESULT: NORMAL

## 2019-05-01 ENCOUNTER — TELEPHONE (OUTPATIENT)
Dept: ENDOCRINOLOGY | Facility: CLINIC | Age: 54
End: 2019-05-01

## 2019-05-06 ENCOUNTER — OFFICE VISIT (OUTPATIENT)
Dept: HEMATOLOGY ONCOLOGY | Facility: CLINIC | Age: 54
End: 2019-05-06
Payer: MEDICARE

## 2019-05-06 VITALS
HEIGHT: 68 IN | TEMPERATURE: 97.7 F | SYSTOLIC BLOOD PRESSURE: 132 MMHG | OXYGEN SATURATION: 98 % | HEART RATE: 69 BPM | BODY MASS INDEX: 32.77 KG/M2 | RESPIRATION RATE: 18 BRPM | DIASTOLIC BLOOD PRESSURE: 60 MMHG | WEIGHT: 216.2 LBS

## 2019-05-06 DIAGNOSIS — C90.00 MULTIPLE MYELOMA NOT HAVING ACHIEVED REMISSION (HCC): ICD-10-CM

## 2019-05-06 DIAGNOSIS — Z94.0 RENAL TRANSPLANT RECIPIENT: Primary | ICD-10-CM

## 2019-05-06 DIAGNOSIS — E10.21 DIABETIC NEPHROPATHY ASSOCIATED WITH TYPE 1 DIABETES MELLITUS (HCC): Primary | ICD-10-CM

## 2019-05-06 PROCEDURE — 99215 OFFICE O/P EST HI 40 MIN: CPT | Performed by: INTERNAL MEDICINE

## 2019-05-06 RX ORDER — TACROLIMUS 1 MG/1
3 CAPSULE ORAL 2 TIMES DAILY
Qty: 180 CAPSULE | Refills: 6 | Status: SHIPPED | OUTPATIENT
Start: 2019-05-06 | End: 2019-06-18 | Stop reason: SDUPTHER

## 2019-05-06 RX ORDER — ACYCLOVIR 400 MG/1
400 TABLET ORAL 2 TIMES DAILY
Qty: 180 TABLET | Refills: 1 | Status: SHIPPED | OUTPATIENT
Start: 2019-05-06 | End: 2019-07-01 | Stop reason: SDUPTHER

## 2019-05-07 ENCOUNTER — TELEPHONE (OUTPATIENT)
Dept: HEMATOLOGY ONCOLOGY | Facility: CLINIC | Age: 54
End: 2019-05-07

## 2019-05-10 ENCOUNTER — DOCUMENTATION (OUTPATIENT)
Dept: HEMATOLOGY ONCOLOGY | Facility: CLINIC | Age: 54
End: 2019-05-10

## 2019-05-13 ENCOUNTER — TELEPHONE (OUTPATIENT)
Dept: HEMATOLOGY ONCOLOGY | Facility: CLINIC | Age: 54
End: 2019-05-13

## 2019-05-15 ENCOUNTER — TELEPHONE (OUTPATIENT)
Dept: HEMATOLOGY ONCOLOGY | Facility: CLINIC | Age: 54
End: 2019-05-15

## 2019-05-20 DIAGNOSIS — E10.65 TYPE 1 DIABETES MELLITUS WITH HYPERGLYCEMIA (HCC): ICD-10-CM

## 2019-05-22 ENCOUNTER — TELEPHONE (OUTPATIENT)
Dept: HEMATOLOGY ONCOLOGY | Facility: CLINIC | Age: 54
End: 2019-05-22

## 2019-05-23 ENCOUNTER — TELEPHONE (OUTPATIENT)
Dept: HEMATOLOGY ONCOLOGY | Facility: CLINIC | Age: 54
End: 2019-05-23

## 2019-05-24 DIAGNOSIS — I10 BENIGN ESSENTIAL HTN: ICD-10-CM

## 2019-05-24 RX ORDER — PREDNISONE 1 MG/1
5 TABLET ORAL DAILY
Qty: 90 TABLET | Refills: 3 | Status: SHIPPED | OUTPATIENT
Start: 2019-05-24 | End: 2020-01-01

## 2019-05-24 RX ORDER — CLONIDINE HYDROCHLORIDE 0.1 MG/1
TABLET ORAL
Qty: 720 TABLET | Refills: 4 | Status: SHIPPED | OUTPATIENT
Start: 2019-05-24 | End: 2019-05-24 | Stop reason: SDUPTHER

## 2019-05-26 DIAGNOSIS — E87.2 ACIDEMIA: ICD-10-CM

## 2019-05-26 RX ORDER — CLONIDINE HYDROCHLORIDE 0.1 MG/1
TABLET ORAL
Qty: 810 TABLET | Refills: 4 | Status: SHIPPED | OUTPATIENT
Start: 2019-05-26 | End: 2020-01-02 | Stop reason: HOSPADM

## 2019-05-28 RX ORDER — SODIUM BICARBONATE 650 MG/1
TABLET ORAL
Qty: 180 TABLET | Refills: 0 | Status: ON HOLD | OUTPATIENT
Start: 2019-05-28 | End: 2020-01-23 | Stop reason: SDUPTHER

## 2019-05-29 ENCOUNTER — OFFICE VISIT (OUTPATIENT)
Dept: FAMILY MEDICINE CLINIC | Facility: CLINIC | Age: 54
End: 2019-05-29
Payer: MEDICARE

## 2019-05-29 ENCOUNTER — LAB (OUTPATIENT)
Dept: LAB | Age: 54
End: 2019-05-29
Payer: MEDICARE

## 2019-05-29 ENCOUNTER — TRANSCRIBE ORDERS (OUTPATIENT)
Dept: ADMINISTRATIVE | Age: 54
End: 2019-05-29

## 2019-05-29 VITALS
OXYGEN SATURATION: 96 % | BODY MASS INDEX: 33.19 KG/M2 | HEIGHT: 68 IN | TEMPERATURE: 98.9 F | HEART RATE: 68 BPM | DIASTOLIC BLOOD PRESSURE: 54 MMHG | SYSTOLIC BLOOD PRESSURE: 160 MMHG | RESPIRATION RATE: 16 BRPM | WEIGHT: 219 LBS

## 2019-05-29 DIAGNOSIS — C90.00 MULTIPLE MYELOMA NOT HAVING ACHIEVED REMISSION (HCC): ICD-10-CM

## 2019-05-29 DIAGNOSIS — Z94.0 KIDNEY REPLACED BY TRANSPLANT: ICD-10-CM

## 2019-05-29 DIAGNOSIS — F33.42 MAJOR DEPRESSIVE DISORDER, RECURRENT EPISODE, IN FULL REMISSION (HCC): Chronic | ICD-10-CM

## 2019-05-29 DIAGNOSIS — E03.9 ACQUIRED HYPOTHYROIDISM: ICD-10-CM

## 2019-05-29 DIAGNOSIS — E10.42 CONTROLLED TYPE 1 DIABETES MELLITUS WITH DIABETIC POLYNEUROPATHY (HCC): ICD-10-CM

## 2019-05-29 DIAGNOSIS — N18.4 CHRONIC KIDNEY DISEASE, STAGE 4 (SEVERE) (HCC): ICD-10-CM

## 2019-05-29 DIAGNOSIS — I10 ESSENTIAL HYPERTENSION: ICD-10-CM

## 2019-05-29 DIAGNOSIS — Z94.0 KIDNEY REPLACED BY TRANSPLANT: Primary | ICD-10-CM

## 2019-05-29 DIAGNOSIS — I12.9 BENIGN HYPERTENSION WITH CKD (CHRONIC KIDNEY DISEASE) STAGE IV (HCC): Primary | ICD-10-CM

## 2019-05-29 DIAGNOSIS — I35.1 NONRHEUMATIC AORTIC VALVE INSUFFICIENCY: ICD-10-CM

## 2019-05-29 DIAGNOSIS — E10.42 TYPE 1 DIABETES MELLITUS WITH DIABETIC POLYNEUROPATHY (HCC): ICD-10-CM

## 2019-05-29 DIAGNOSIS — N18.4 BENIGN HYPERTENSION WITH CKD (CHRONIC KIDNEY DISEASE) STAGE IV (HCC): Primary | ICD-10-CM

## 2019-05-29 DIAGNOSIS — E78.5 HYPERLIPIDEMIA, UNSPECIFIED HYPERLIPIDEMIA TYPE: ICD-10-CM

## 2019-05-29 DIAGNOSIS — E10.21 DIABETIC NEPHROPATHY ASSOCIATED WITH TYPE 1 DIABETES MELLITUS (HCC): ICD-10-CM

## 2019-05-29 DIAGNOSIS — F41.1 GAD (GENERALIZED ANXIETY DISORDER): Chronic | ICD-10-CM

## 2019-05-29 LAB
ALBUMIN SERPL BCP-MCNC: 2.6 G/DL (ref 3.5–5)
ALP SERPL-CCNC: 106 U/L (ref 46–116)
ALT SERPL W P-5'-P-CCNC: 22 U/L (ref 12–78)
ANION GAP SERPL CALCULATED.3IONS-SCNC: 14 MMOL/L (ref 4–13)
AST SERPL W P-5'-P-CCNC: 17 U/L (ref 5–45)
BACTERIA UR QL AUTO: ABNORMAL /HPF
BASOPHILS # BLD AUTO: 0.06 THOUSANDS/ΜL (ref 0–0.1)
BASOPHILS NFR BLD AUTO: 1 % (ref 0–1)
BILIRUB SERPL-MCNC: 0.2 MG/DL (ref 0.2–1)
BILIRUB UR QL STRIP: NEGATIVE
BUN SERPL-MCNC: 38 MG/DL (ref 5–25)
CALCIUM SERPL-MCNC: 9 MG/DL (ref 8.3–10.1)
CHLORIDE SERPL-SCNC: 106 MMOL/L (ref 100–108)
CLARITY UR: ABNORMAL
CO2 SERPL-SCNC: 19 MMOL/L (ref 21–32)
COLOR UR: YELLOW
CREAT SERPL-MCNC: 1.97 MG/DL (ref 0.6–1.3)
CREAT UR-MCNC: 39.7 MG/DL
EOSINOPHIL # BLD AUTO: 0.2 THOUSAND/ΜL (ref 0–0.61)
EOSINOPHIL NFR BLD AUTO: 2 % (ref 0–6)
ERYTHROCYTE [DISTWIDTH] IN BLOOD BY AUTOMATED COUNT: 17.3 % (ref 11.6–15.1)
EST. AVERAGE GLUCOSE BLD GHB EST-MCNC: 100 MG/DL
GFR SERPL CREATININE-BSD FRML MDRD: 28 ML/MIN/1.73SQ M
GLUCOSE P FAST SERPL-MCNC: 122 MG/DL (ref 65–99)
GLUCOSE UR STRIP-MCNC: NEGATIVE MG/DL
HBA1C MFR BLD: 5.1 % (ref 4.2–6.3)
HCT VFR BLD AUTO: 32.5 % (ref 34.8–46.1)
HGB BLD-MCNC: 10.2 G/DL (ref 11.5–15.4)
HGB UR QL STRIP.AUTO: NEGATIVE
HYALINE CASTS #/AREA URNS LPF: ABNORMAL /LPF
IMM GRANULOCYTES # BLD AUTO: 0.05 THOUSAND/UL (ref 0–0.2)
IMM GRANULOCYTES NFR BLD AUTO: 1 % (ref 0–2)
KETONES UR STRIP-MCNC: NEGATIVE MG/DL
LEUKOCYTE ESTERASE UR QL STRIP: ABNORMAL
LYMPHOCYTES # BLD AUTO: 1.28 THOUSANDS/ΜL (ref 0.6–4.47)
LYMPHOCYTES NFR BLD AUTO: 14 % (ref 14–44)
MAGNESIUM SERPL-MCNC: 2 MG/DL (ref 1.6–2.6)
MCH RBC QN AUTO: 30.3 PG (ref 26.8–34.3)
MCHC RBC AUTO-ENTMCNC: 31.4 G/DL (ref 31.4–37.4)
MCV RBC AUTO: 96 FL (ref 82–98)
MONOCYTES # BLD AUTO: 0.5 THOUSAND/ΜL (ref 0.17–1.22)
MONOCYTES NFR BLD AUTO: 5 % (ref 4–12)
NEUTROPHILS # BLD AUTO: 7.14 THOUSANDS/ΜL (ref 1.85–7.62)
NEUTS SEG NFR BLD AUTO: 77 % (ref 43–75)
NITRITE UR QL STRIP: NEGATIVE
NON-SQ EPI CELLS URNS QL MICRO: ABNORMAL /HPF
NRBC BLD AUTO-RTO: 0 /100 WBCS
PH UR STRIP.AUTO: 8 [PH]
PHOSPHATE SERPL-MCNC: 4.2 MG/DL (ref 2.7–4.5)
PLATELET # BLD AUTO: 248 THOUSANDS/UL (ref 149–390)
PMV BLD AUTO: 12.1 FL (ref 8.9–12.7)
POTASSIUM SERPL-SCNC: 4.1 MMOL/L (ref 3.5–5.3)
PROT SERPL-MCNC: 8.7 G/DL (ref 6.4–8.2)
PROT UR STRIP-MCNC: ABNORMAL MG/DL
PROT UR-MCNC: 109 MG/DL
PROT/CREAT UR: 2.75 MG/G{CREAT} (ref 0–0.1)
RBC # BLD AUTO: 3.37 MILLION/UL (ref 3.81–5.12)
RBC #/AREA URNS AUTO: ABNORMAL /HPF
SODIUM SERPL-SCNC: 139 MMOL/L (ref 136–145)
SP GR UR STRIP.AUTO: 1.01 (ref 1–1.03)
T4 FREE SERPL-MCNC: 0.89 NG/DL (ref 0.76–1.46)
TSH SERPL DL<=0.05 MIU/L-ACNC: 2.37 UIU/ML (ref 0.36–3.74)
UROBILINOGEN UR QL STRIP.AUTO: 0.2 E.U./DL
WBC # BLD AUTO: 9.23 THOUSAND/UL (ref 4.31–10.16)
WBC #/AREA URNS AUTO: ABNORMAL /HPF

## 2019-05-29 PROCEDURE — 99214 OFFICE O/P EST MOD 30 MIN: CPT | Performed by: FAMILY MEDICINE

## 2019-05-29 PROCEDURE — 84100 ASSAY OF PHOSPHORUS: CPT

## 2019-05-29 PROCEDURE — 84156 ASSAY OF PROTEIN URINE: CPT | Performed by: INTERNAL MEDICINE

## 2019-05-29 PROCEDURE — 80197 ASSAY OF TACROLIMUS: CPT

## 2019-05-29 PROCEDURE — 80053 COMPREHEN METABOLIC PANEL: CPT

## 2019-05-29 PROCEDURE — 82985 ASSAY OF GLYCATED PROTEIN: CPT

## 2019-05-29 PROCEDURE — 83735 ASSAY OF MAGNESIUM: CPT

## 2019-05-29 PROCEDURE — 83036 HEMOGLOBIN GLYCOSYLATED A1C: CPT

## 2019-05-29 PROCEDURE — 81001 URINALYSIS AUTO W/SCOPE: CPT | Performed by: INTERNAL MEDICINE

## 2019-05-29 PROCEDURE — 84439 ASSAY OF FREE THYROXINE: CPT

## 2019-05-29 PROCEDURE — 84443 ASSAY THYROID STIM HORMONE: CPT

## 2019-05-29 PROCEDURE — 85025 COMPLETE CBC W/AUTO DIFF WBC: CPT

## 2019-05-29 PROCEDURE — 83883 ASSAY NEPHELOMETRY NOT SPEC: CPT

## 2019-05-29 PROCEDURE — 36415 COLL VENOUS BLD VENIPUNCTURE: CPT

## 2019-05-29 PROCEDURE — 82570 ASSAY OF URINE CREATININE: CPT | Performed by: INTERNAL MEDICINE

## 2019-05-30 ENCOUNTER — TELEPHONE (OUTPATIENT)
Dept: NEPHROLOGY | Facility: CLINIC | Age: 54
End: 2019-05-30

## 2019-05-30 ENCOUNTER — TELEPHONE (OUTPATIENT)
Dept: ENDOCRINOLOGY | Facility: CLINIC | Age: 54
End: 2019-05-30

## 2019-05-30 LAB
FRUCTOSAMINE SERPL-SCNC: 211 UMOL/L (ref 0–285)
KAPPA LC FREE SER-MCNC: 73.4 MG/L (ref 3.3–19.4)
KAPPA LC FREE/LAMBDA FREE SER: 3.6 {RATIO} (ref 0.26–1.65)
LAMBDA LC FREE SERPL-MCNC: 20.4 MG/L (ref 5.7–26.3)
TACROLIMUS BLD-MCNC: 4.3 NG/ML (ref 2–20)

## 2019-06-03 ENCOUNTER — DOCUMENTATION (OUTPATIENT)
Dept: HEMATOLOGY ONCOLOGY | Facility: CLINIC | Age: 54
End: 2019-06-03

## 2019-06-04 DIAGNOSIS — C90.00 MULTIPLE MYELOMA NOT HAVING ACHIEVED REMISSION (HCC): Primary | ICD-10-CM

## 2019-06-05 ENCOUNTER — OFFICE VISIT (OUTPATIENT)
Dept: PSYCHIATRY | Facility: CLINIC | Age: 54
End: 2019-06-05
Payer: MEDICARE

## 2019-06-05 VITALS
WEIGHT: 223 LBS | HEIGHT: 68 IN | SYSTOLIC BLOOD PRESSURE: 136 MMHG | BODY MASS INDEX: 33.8 KG/M2 | DIASTOLIC BLOOD PRESSURE: 68 MMHG | HEART RATE: 69 BPM

## 2019-06-05 DIAGNOSIS — F43.12 POST-TRAUMATIC STRESS DISORDER, CHRONIC: Chronic | ICD-10-CM

## 2019-06-05 DIAGNOSIS — F41.1 GAD (GENERALIZED ANXIETY DISORDER): Chronic | ICD-10-CM

## 2019-06-05 DIAGNOSIS — F33.42 MAJOR DEPRESSIVE DISORDER, RECURRENT EPISODE, IN FULL REMISSION (HCC): Primary | Chronic | ICD-10-CM

## 2019-06-05 PROCEDURE — 99213 OFFICE O/P EST LOW 20 MIN: CPT | Performed by: PSYCHIATRY & NEUROLOGY

## 2019-06-05 PROCEDURE — 90833 PSYTX W PT W E/M 30 MIN: CPT | Performed by: PSYCHIATRY & NEUROLOGY

## 2019-06-05 RX ORDER — SERTRALINE HYDROCHLORIDE 100 MG/1
200 TABLET, FILM COATED ORAL DAILY
Qty: 180 TABLET | Refills: 1 | Status: SHIPPED | OUTPATIENT
Start: 2019-06-05 | End: 2020-01-07

## 2019-06-05 RX ORDER — ARIPIPRAZOLE 30 MG/1
30 TABLET ORAL
Qty: 90 TABLET | Refills: 1 | Status: SHIPPED | OUTPATIENT
Start: 2019-06-05 | End: 2020-02-18

## 2019-06-05 RX ORDER — DULOXETIN HYDROCHLORIDE 60 MG/1
60 CAPSULE, DELAYED RELEASE ORAL 2 TIMES DAILY
Qty: 180 CAPSULE | Refills: 1 | Status: SHIPPED | OUTPATIENT
Start: 2019-06-05 | End: 2020-01-01 | Stop reason: SDUPTHER

## 2019-06-05 RX ORDER — LORAZEPAM 2 MG/1
2 TABLET ORAL 3 TIMES DAILY PRN
Qty: 90 TABLET | Refills: 3 | Status: SHIPPED | OUTPATIENT
Start: 2019-06-29 | End: 2019-11-13 | Stop reason: SDUPTHER

## 2019-06-05 RX ORDER — BUSPIRONE HYDROCHLORIDE 5 MG/1
5 TABLET ORAL 2 TIMES DAILY
Qty: 180 TABLET | Refills: 1 | Status: SHIPPED | OUTPATIENT
Start: 2019-06-05 | End: 2020-01-01

## 2019-06-07 ENCOUNTER — OFFICE VISIT (OUTPATIENT)
Dept: HEMATOLOGY ONCOLOGY | Facility: CLINIC | Age: 54
End: 2019-06-07
Payer: MEDICARE

## 2019-06-07 VITALS
HEART RATE: 79 BPM | DIASTOLIC BLOOD PRESSURE: 60 MMHG | HEIGHT: 68 IN | SYSTOLIC BLOOD PRESSURE: 152 MMHG | RESPIRATION RATE: 16 BRPM | BODY MASS INDEX: 33.19 KG/M2 | WEIGHT: 219 LBS

## 2019-06-07 DIAGNOSIS — N18.30 ANEMIA DUE TO STAGE 3 CHRONIC KIDNEY DISEASE (HCC): Primary | ICD-10-CM

## 2019-06-07 DIAGNOSIS — D63.1 ANEMIA DUE TO STAGE 3 CHRONIC KIDNEY DISEASE (HCC): Primary | ICD-10-CM

## 2019-06-07 DIAGNOSIS — N18.30 STAGE 3 CHRONIC KIDNEY DISEASE (HCC): Chronic | ICD-10-CM

## 2019-06-07 DIAGNOSIS — C90.00 MULTIPLE MYELOMA NOT HAVING ACHIEVED REMISSION (HCC): ICD-10-CM

## 2019-06-07 DIAGNOSIS — Z86.19 HISTORY OF HERPES ZOSTER: ICD-10-CM

## 2019-06-07 PROCEDURE — 99214 OFFICE O/P EST MOD 30 MIN: CPT | Performed by: PHYSICIAN ASSISTANT

## 2019-06-11 ENCOUNTER — OFFICE VISIT (OUTPATIENT)
Dept: ENDOCRINOLOGY | Facility: CLINIC | Age: 54
End: 2019-06-11
Payer: MEDICARE

## 2019-06-11 VITALS
SYSTOLIC BLOOD PRESSURE: 146 MMHG | WEIGHT: 225 LBS | BODY MASS INDEX: 34.1 KG/M2 | DIASTOLIC BLOOD PRESSURE: 66 MMHG | HEART RATE: 66 BPM | HEIGHT: 68 IN

## 2019-06-11 DIAGNOSIS — Z94.0 STATUS POST SIMULTANEOUS KIDNEY AND PANCREAS TRANSPLANT (HCC): ICD-10-CM

## 2019-06-11 DIAGNOSIS — Z94.83 STATUS POST SIMULTANEOUS KIDNEY AND PANCREAS TRANSPLANT (HCC): ICD-10-CM

## 2019-06-11 DIAGNOSIS — D47.2 MGUS (MONOCLONAL GAMMOPATHY OF UNKNOWN SIGNIFICANCE): Chronic | ICD-10-CM

## 2019-06-11 DIAGNOSIS — E78.5 HYPERLIPIDEMIA, UNSPECIFIED HYPERLIPIDEMIA TYPE: ICD-10-CM

## 2019-06-11 DIAGNOSIS — E03.9 ACQUIRED HYPOTHYROIDISM: ICD-10-CM

## 2019-06-11 DIAGNOSIS — E10.65 TYPE 1 DIABETES MELLITUS WITH HYPERGLYCEMIA (HCC): Primary | ICD-10-CM

## 2019-06-11 DIAGNOSIS — N18.4 CHRONIC KIDNEY DISEASE, STAGE 4 (SEVERE) (HCC): ICD-10-CM

## 2019-06-11 PROCEDURE — 99214 OFFICE O/P EST MOD 30 MIN: CPT | Performed by: INTERNAL MEDICINE

## 2019-06-17 ENCOUNTER — LAB (OUTPATIENT)
Dept: LAB | Age: 54
End: 2019-06-17
Payer: MEDICARE

## 2019-06-17 DIAGNOSIS — Z94.0 KIDNEY REPLACED BY TRANSPLANT: ICD-10-CM

## 2019-06-17 LAB
ALBUMIN SERPL BCP-MCNC: 2.9 G/DL (ref 3.5–5)
ALP SERPL-CCNC: 114 U/L (ref 46–116)
ALT SERPL W P-5'-P-CCNC: 29 U/L (ref 12–78)
ANION GAP SERPL CALCULATED.3IONS-SCNC: 7 MMOL/L (ref 4–13)
AST SERPL W P-5'-P-CCNC: 20 U/L (ref 5–45)
BACTERIA UR QL AUTO: ABNORMAL /HPF
BILIRUB SERPL-MCNC: 0.24 MG/DL (ref 0.2–1)
BILIRUB UR QL STRIP: NEGATIVE
BUN SERPL-MCNC: 39 MG/DL (ref 5–25)
CALCIUM SERPL-MCNC: 8.8 MG/DL (ref 8.3–10.1)
CHLORIDE SERPL-SCNC: 108 MMOL/L (ref 100–108)
CLARITY UR: ABNORMAL
CO2 SERPL-SCNC: 23 MMOL/L (ref 21–32)
COARSE GRAN CASTS URNS QL MICRO: ABNORMAL /LPF
COLOR UR: YELLOW
CREAT SERPL-MCNC: 2.21 MG/DL (ref 0.6–1.3)
CREAT UR-MCNC: 56.4 MG/DL
GFR SERPL CREATININE-BSD FRML MDRD: 24 ML/MIN/1.73SQ M
GLUCOSE P FAST SERPL-MCNC: 66 MG/DL (ref 65–99)
GLUCOSE UR STRIP-MCNC: NEGATIVE MG/DL
HGB UR QL STRIP.AUTO: NEGATIVE
HYALINE CASTS #/AREA URNS LPF: ABNORMAL /LPF
KETONES UR STRIP-MCNC: NEGATIVE MG/DL
LEUKOCYTE ESTERASE UR QL STRIP: ABNORMAL
MAGNESIUM SERPL-MCNC: 3 MG/DL (ref 1.6–2.6)
NITRITE UR QL STRIP: NEGATIVE
NON-SQ EPI CELLS URNS QL MICRO: ABNORMAL /HPF
PH UR STRIP.AUTO: 7 [PH]
PHOSPHATE SERPL-MCNC: 5 MG/DL (ref 2.7–4.5)
POTASSIUM SERPL-SCNC: 4.5 MMOL/L (ref 3.5–5.3)
PROT SERPL-MCNC: 8.4 G/DL (ref 6.4–8.2)
PROT UR STRIP-MCNC: ABNORMAL MG/DL
PROT UR-MCNC: 107 MG/DL
PROT/CREAT UR: 1.9 MG/G{CREAT} (ref 0–0.1)
RBC #/AREA URNS AUTO: ABNORMAL /HPF
SODIUM SERPL-SCNC: 138 MMOL/L (ref 136–145)
SP GR UR STRIP.AUTO: 1.01 (ref 1–1.03)
TACROLIMUS BLD-MCNC: 5.4 NG/ML (ref 2–20)
UROBILINOGEN UR QL STRIP.AUTO: 0.2 E.U./DL
WBC #/AREA URNS AUTO: ABNORMAL /HPF

## 2019-06-17 PROCEDURE — 82570 ASSAY OF URINE CREATININE: CPT | Performed by: INTERNAL MEDICINE

## 2019-06-17 PROCEDURE — 83735 ASSAY OF MAGNESIUM: CPT

## 2019-06-17 PROCEDURE — 84156 ASSAY OF PROTEIN URINE: CPT | Performed by: INTERNAL MEDICINE

## 2019-06-17 PROCEDURE — 80053 COMPREHEN METABOLIC PANEL: CPT

## 2019-06-17 PROCEDURE — 84100 ASSAY OF PHOSPHORUS: CPT

## 2019-06-17 PROCEDURE — 80197 ASSAY OF TACROLIMUS: CPT

## 2019-06-17 PROCEDURE — 81001 URINALYSIS AUTO W/SCOPE: CPT | Performed by: INTERNAL MEDICINE

## 2019-06-17 PROCEDURE — 36415 COLL VENOUS BLD VENIPUNCTURE: CPT

## 2019-06-18 ENCOUNTER — OFFICE VISIT (OUTPATIENT)
Dept: NEPHROLOGY | Facility: CLINIC | Age: 54
End: 2019-06-18
Payer: MEDICARE

## 2019-06-18 VITALS
HEIGHT: 68 IN | SYSTOLIC BLOOD PRESSURE: 148 MMHG | WEIGHT: 224.6 LBS | DIASTOLIC BLOOD PRESSURE: 62 MMHG | BODY MASS INDEX: 34.04 KG/M2 | HEART RATE: 68 BPM

## 2019-06-18 DIAGNOSIS — Z94.0 RENAL TRANSPLANT RECIPIENT: ICD-10-CM

## 2019-06-18 DIAGNOSIS — N18.4 CKD (CHRONIC KIDNEY DISEASE), STAGE IV (HCC): Primary | ICD-10-CM

## 2019-06-18 PROCEDURE — 99215 OFFICE O/P EST HI 40 MIN: CPT | Performed by: INTERNAL MEDICINE

## 2019-06-18 RX ORDER — TACROLIMUS 1 MG/1
CAPSULE ORAL
Qty: 180 CAPSULE | Refills: 6
Start: 2019-06-18 | End: 2020-01-02 | Stop reason: HOSPADM

## 2019-06-24 DIAGNOSIS — E03.9 ACQUIRED HYPOTHYROIDISM: ICD-10-CM

## 2019-06-24 DIAGNOSIS — E10.42 TYPE 1 DIABETES MELLITUS WITH DIABETIC POLYNEUROPATHY (HCC): ICD-10-CM

## 2019-06-24 RX ORDER — LEVOTHYROXINE SODIUM 0.12 MG/1
125 TABLET ORAL DAILY
Qty: 90 TABLET | Refills: 2 | Status: SHIPPED | OUTPATIENT
Start: 2019-06-24 | End: 2020-01-01

## 2019-06-26 DIAGNOSIS — F41.1 GAD (GENERALIZED ANXIETY DISORDER): Chronic | ICD-10-CM

## 2019-06-26 RX ORDER — LORAZEPAM 2 MG/1
TABLET ORAL
Qty: 90 TABLET | Refills: 0 | OUTPATIENT
Start: 2019-06-26

## 2019-06-27 ENCOUNTER — TELEPHONE (OUTPATIENT)
Dept: FAMILY MEDICINE CLINIC | Facility: CLINIC | Age: 54
End: 2019-06-27

## 2019-07-01 ENCOUNTER — TELEPHONE (OUTPATIENT)
Dept: HEMATOLOGY ONCOLOGY | Facility: CLINIC | Age: 54
End: 2019-07-01

## 2019-07-01 DIAGNOSIS — C90.00 MULTIPLE MYELOMA NOT HAVING ACHIEVED REMISSION (HCC): ICD-10-CM

## 2019-07-01 DIAGNOSIS — E10.21 DIABETIC NEPHROPATHY ASSOCIATED WITH TYPE 1 DIABETES MELLITUS (HCC): ICD-10-CM

## 2019-07-01 RX ORDER — ACYCLOVIR 400 MG/1
400 TABLET ORAL 3 TIMES DAILY
Qty: 270 TABLET | Refills: 0 | Status: SHIPPED | OUTPATIENT
Start: 2019-07-01 | End: 2019-07-16

## 2019-07-08 ENCOUNTER — TELEPHONE (OUTPATIENT)
Dept: HEMATOLOGY ONCOLOGY | Facility: CLINIC | Age: 54
End: 2019-07-08

## 2019-07-08 NOTE — TELEPHONE ENCOUNTER
Called patient to review her rash  She has had it for 2 weeks and it is dry, itchy, and cracked  She states she has used hydrocortisone cream, 50 mg PO benadryl, and Eucerin cream  These have not been effective for her  Changed her appt to tomorrow with Angelika Braxton at 9 am for a f/u  Patient verbalized understanding

## 2019-07-08 NOTE — PROGRESS NOTES
Hematology/Oncology Outpatient Follow- up Note  Amie Pinon 53 y o  female MRN: @ Encounter: [id number]        Date:  7/9/2019      Assessment / Plan:      Progressive IgG kappa multiple myeloma (dx 4/2019) from smoldering MM (dx 9/2017) in a patient with complicated medical history including diabetes mellitus type 1 insulin dependent, history of kidney and pancreatic transplant in 1998 with subsequent pancreatic insufficiency, worsening kidney function test, multiple episodes of herpes zoster, immunocompromised status  Bone marrow biopsy 4/2019 showed more progressive myeloma cells in the bone marrow about 35%, monosomy 13, hyperdiploidy with multiple chromosomes abnormalities  She has worsening renal insufficency        Due to her symptomatic neuropathy from diabetes, Ixazomib 4 mg p o  Weekly 3 weeks on 1 week off without dexamethasone instead of Velcade initiated along with acyclovir 400 mg p o  B i d  For  herpes zoster prophylaxis initiated  6/13/2019       2   Rash, likely secondary to Ninlaro  Hold cycle #2 which is due to start 7/11/2019  She is given a Medrol Dosepak  She was informed that this will increase her glucose  She does check it 4 times per day and adjust her insulin as needed  She is asked to follow up in 1 week    Call if any issues or concerns           HPI:  Lakesha Cohn is a 50-year-old  female with history of type 1 diabetes mellitus, diabetic neuropathy, diabetic nephropathy, status post pancreas and kidney transplant in 1998 at [address] [place] [address], amputation of the right big toe, cholecystectomy, vitamin-D deficiency, exacerbation of diabetes mellitus while she is on insulin, possible pancreas burn out, herpes zoster x2, who was found to have abnormal serum protein electrophoresis in August 2017 with IgG kappa a peak of 0 4 grams/deciliter and peak 2  of 2 27 grams/deciliter, mildly elevated kappa light chain, chronic kidney disease     Status post bone marrow biopsy 9/20/2017 showed 15-20% plasma cells, normal cytogenetics and normal FISH panel for myeloma  Bone skeletal survey showed no evidence of lytic lesions  She was diagnosed with smoldering multiple myeloma  She was meet with Dr Mehreen Almendarez from [facility] and the decision was to continue to observe     She has diabetic neuropathy grade 3, uses a cane for ambulation, herpes zoster in February 2019      On 04/10/2019 kappa light chain 69, lambda light chain 17 2 with a ratio of 4 0 which increased from ratio of 2 8 in January 2019  Serum protein electrophoresis showed M protein of 0 5 and 2 67 IgG kappa, IgG 2 9 g, IgA 62, IgM 31, creatinine 2 0, total protein 9, albumin 3, calcium 8 6     Repeat bone marrow biopsy on 04/17/2019 showed progressive multiple myeloma with plasma cells in the range of 35%, now with multiple chromosomal abnormalities including 13 Q deletion, 11 Q, trisomy 11, gain of 17 PO trisomy 17, hyperdiploid with cane of additional copies of chromosome 5, 6, 7, 9, 11, 15, 17, 18, large deletion involving most of the chromosome 13 (monosomy 13) 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        Interval History:  Afia Bunch denied due to: not receiving one prior therapy (revlimid, thalomid, velcade) nor is this being used with desamethasone AND revlimid     Appeal submitted 5/22/2019  Aifa Bunch approved via [facility] @ 9:08([phone])  Auth # [id number] is valid from 5/8/2019 through 12/31/2019      She started Afia Bunch 6/13/2019  She has had a pruritic rash along her chest and back for the past week unresponsive to topical steroid cream and Benadryl  She denies any itchy eyes or tightness in her throat  Patient called 7/8/2019 regarding 2 week history of  and she doesn't know if its from her new medication        Test Results:        Labs:   Lab Results   Component Value Date    HGB 10 2 (L) 05/29/2019    HCT 32 5 (L) 05/29/2019    MCV 96 05/29/2019     05/29/2019    WBC 9 23 05/29/2019 NRBC 0 05/29/2019     Lab Results   Component Value Date     (L) 01/06/2016    K 4 5 06/17/2019     06/17/2019    CO2 23 06/17/2019    ANIONGAP 6 01/06/2016    BUN 39 (H) 06/17/2019    CREATININE 2 21 (H) 06/17/2019    GLUCOSE 103 09/13/2017    GLUF 66 06/17/2019    CALCIUM 8 8 06/17/2019    CORRECTEDCA 11 7 (H) 08/28/2018    AST 20 06/17/2019    ALT 29 06/17/2019    ALKPHOS 114 06/17/2019    PROT 8 3 (H) 12/14/2015    BILITOT 0 27 12/14/2015    EGFR 24 06/17/2019       Imaging: No results found  ROS:  As mentioned in HPI & Interval History otherwise 14 point ROS negative  Allergies: Allergies   Allergen Reactions    Cefadroxil     Morphine Other (See Comments)     Other reaction(s): Other (See Comments)  projectile vomiting    Morphine And Related GI Intolerance    Myrbetriq [Mirabegron] Hives    Penicillins Hives     Other reaction(s): Other (See Comments)  Respiratory Distress,hives  Tolerates cefazolin     Current Medications: Reviewed  PMH/FH/SH:  Reviewed      Physical Exam:    There is no height or weight on file to calculate BSA  Ht Readings from Last 3 Encounters:   06/18/19 5' 8" (1 727 m)   06/11/19 5' 8" (1 727 m)   06/07/19 5' 8" (1 727 m)        Wt Readings from Last 3 Encounters:   06/18/19 102 kg (224 lb 9 6 oz)   06/11/19 102 kg (225 lb)   06/07/19 99 3 kg (219 lb)        Temp Readings from Last 3 Encounters:   05/29/19 98 9 °F (37 2 °C) (Tympanic)   05/06/19 97 7 °F (36 5 °C)   04/17/19 97 5 °F (36 4 °C)        BP Readings from Last 3 Encounters:   06/18/19 148/62   06/11/19 146/66   06/07/19 152/60           Physical Exam   Constitutional: She is oriented to person, place, and time  She appears well-developed and well-nourished  No distress  HENT:   Head: Normocephalic and atraumatic  Mouth/Throat: No oropharyngeal exudate  Eyes: Pupils are equal, round, and reactive to light  Conjunctivae are normal    Neck: Normal range of motion  Neck supple   No tracheal deviation present  Cardiovascular: Normal rate and regular rhythm  Exam reveals no gallop and no friction rub  No murmur heard  Pulmonary/Chest: Effort normal and breath sounds normal  No respiratory distress  She has no wheezes  She has no rales  She exhibits no tenderness  Abdominal: Soft  She exhibits no distension  There is no tenderness  Musculoskeletal: Normal range of motion  Lymphadenopathy:     She has no cervical adenopathy  Neurological: She is alert and oriented to person, place, and time  Skin: Skin is warm and dry  Rash (fine macular rash on trunk  excoriations present) noted  She is not diaphoretic  No erythema  No pallor  Psychiatric: She has a normal mood and affect  Her behavior is normal  Judgment and thought content normal    Vitals reviewed        ECO      Emergency Contacts:    Mirtha Dates, [phone],

## 2019-07-08 NOTE — TELEPHONE ENCOUNTER
Patient called and stated she has been having an itchy rash since about 2 weeks ago and she doesn't know if its from her new medication

## 2019-07-09 ENCOUNTER — OFFICE VISIT (OUTPATIENT)
Dept: HEMATOLOGY ONCOLOGY | Facility: CLINIC | Age: 54
End: 2019-07-09
Payer: MEDICARE

## 2019-07-09 VITALS
SYSTOLIC BLOOD PRESSURE: 162 MMHG | BODY MASS INDEX: 34.86 KG/M2 | OXYGEN SATURATION: 96 % | RESPIRATION RATE: 16 BRPM | HEART RATE: 77 BPM | WEIGHT: 230 LBS | TEMPERATURE: 97.2 F | HEIGHT: 68 IN | DIASTOLIC BLOOD PRESSURE: 52 MMHG

## 2019-07-09 DIAGNOSIS — C90.00 MULTIPLE MYELOMA NOT HAVING ACHIEVED REMISSION (HCC): ICD-10-CM

## 2019-07-09 DIAGNOSIS — R21 RASH: Primary | ICD-10-CM

## 2019-07-09 DIAGNOSIS — E10.8 TYPE 1 DIABETES MELLITUS WITH COMPLICATION (HCC): ICD-10-CM

## 2019-07-09 PROCEDURE — 99213 OFFICE O/P EST LOW 20 MIN: CPT | Performed by: PHYSICIAN ASSISTANT

## 2019-07-09 RX ORDER — METHYLPREDNISOLONE 4 MG/1
TABLET ORAL
Qty: 1 EACH | Refills: 0 | Status: SHIPPED | OUTPATIENT
Start: 2019-07-09 | End: 2019-08-23 | Stop reason: ALTCHOICE

## 2019-07-11 ENCOUNTER — TELEPHONE (OUTPATIENT)
Dept: HEMATOLOGY ONCOLOGY | Facility: CLINIC | Age: 54
End: 2019-07-11

## 2019-07-11 DIAGNOSIS — R21 RASH: Primary | ICD-10-CM

## 2019-07-11 NOTE — TELEPHONE ENCOUNTER
Reviewed with Zachary Oliveira PA-C she wants the patient to stop the steroids and be evaluated by dermatology  Placed the referral and called the patient to instruct her to stop the steroids and we will make an appt with dermatology for her  She was also instructed to continue to hold the ninlaro  The patient verbalized understanding  Please call patient with date and time of appt

## 2019-07-11 NOTE — TELEPHONE ENCOUNTER
I reached out to EDGAR Cross   [address]   Phone: [phone] to schedule an appointment for a consult for her rash  I was able to get an appointment for the patient for next Tuesday 7/16 at 4 pm to see Dr Rafa Georges who had a cancellation  I called the patient and gave her appointment date and time along with the office address and phone number  Patient verbalized understanding and was in agreement

## 2019-07-11 NOTE — TELEPHONE ENCOUNTER
Patient's rash appears worse than the other day when she was in office  Steroids do not seem to be helping, may have made it worse    Please contact patient as soon as you can   [phone]

## 2019-07-12 ENCOUNTER — LAB (OUTPATIENT)
Dept: LAB | Age: 54
End: 2019-07-12
Payer: MEDICARE

## 2019-07-12 DIAGNOSIS — N18.4 CKD (CHRONIC KIDNEY DISEASE), STAGE IV (HCC): ICD-10-CM

## 2019-07-12 DIAGNOSIS — C90.00 MULTIPLE MYELOMA NOT HAVING ACHIEVED REMISSION (HCC): ICD-10-CM

## 2019-07-12 LAB
ALBUMIN SERPL BCP-MCNC: 3 G/DL (ref 3.5–5)
ALP SERPL-CCNC: 96 U/L (ref 46–116)
ALT SERPL W P-5'-P-CCNC: 28 U/L (ref 12–78)
ANION GAP SERPL CALCULATED.3IONS-SCNC: 8 MMOL/L (ref 4–13)
AST SERPL W P-5'-P-CCNC: 14 U/L (ref 5–45)
BASOPHILS # BLD AUTO: 0.05 THOUSANDS/ΜL (ref 0–0.1)
BASOPHILS NFR BLD AUTO: 1 % (ref 0–1)
BILIRUB SERPL-MCNC: 0.26 MG/DL (ref 0.2–1)
BUN SERPL-MCNC: 62 MG/DL (ref 5–25)
CALCIUM SERPL-MCNC: 9.1 MG/DL (ref 8.3–10.1)
CHLORIDE SERPL-SCNC: 106 MMOL/L (ref 100–108)
CO2 SERPL-SCNC: 24 MMOL/L (ref 21–32)
CREAT SERPL-MCNC: 2.2 MG/DL (ref 0.6–1.3)
EOSINOPHIL # BLD AUTO: 0.32 THOUSAND/ΜL (ref 0–0.61)
EOSINOPHIL NFR BLD AUTO: 3 % (ref 0–6)
ERYTHROCYTE [DISTWIDTH] IN BLOOD BY AUTOMATED COUNT: 16.1 % (ref 11.6–15.1)
GFR SERPL CREATININE-BSD FRML MDRD: 25 ML/MIN/1.73SQ M
GLUCOSE P FAST SERPL-MCNC: 92 MG/DL (ref 65–99)
HCT VFR BLD AUTO: 32 % (ref 34.8–46.1)
HGB BLD-MCNC: 9.9 G/DL (ref 11.5–15.4)
IMM GRANULOCYTES # BLD AUTO: 0.12 THOUSAND/UL (ref 0–0.2)
IMM GRANULOCYTES NFR BLD AUTO: 1 % (ref 0–2)
LYMPHOCYTES # BLD AUTO: 1.23 THOUSANDS/ΜL (ref 0.6–4.47)
LYMPHOCYTES NFR BLD AUTO: 13 % (ref 14–44)
MCH RBC QN AUTO: 31.1 PG (ref 26.8–34.3)
MCHC RBC AUTO-ENTMCNC: 30.9 G/DL (ref 31.4–37.4)
MCV RBC AUTO: 101 FL (ref 82–98)
MONOCYTES # BLD AUTO: 0.61 THOUSAND/ΜL (ref 0.17–1.22)
MONOCYTES NFR BLD AUTO: 6 % (ref 4–12)
NEUTROPHILS # BLD AUTO: 7.53 THOUSANDS/ΜL (ref 1.85–7.62)
NEUTS SEG NFR BLD AUTO: 76 % (ref 43–75)
NRBC BLD AUTO-RTO: 0 /100 WBCS
PLATELET # BLD AUTO: 233 THOUSANDS/UL (ref 149–390)
PMV BLD AUTO: 12.5 FL (ref 8.9–12.7)
POTASSIUM SERPL-SCNC: 3.9 MMOL/L (ref 3.5–5.3)
PROT SERPL-MCNC: 9.1 G/DL (ref 6.4–8.2)
RBC # BLD AUTO: 3.18 MILLION/UL (ref 3.81–5.12)
SODIUM SERPL-SCNC: 138 MMOL/L (ref 136–145)
WBC # BLD AUTO: 9.86 THOUSAND/UL (ref 4.31–10.16)

## 2019-07-12 PROCEDURE — 36415 COLL VENOUS BLD VENIPUNCTURE: CPT

## 2019-07-12 PROCEDURE — 85025 COMPLETE CBC W/AUTO DIFF WBC: CPT

## 2019-07-12 PROCEDURE — 80053 COMPREHEN METABOLIC PANEL: CPT

## 2019-07-14 NOTE — PROGRESS NOTES
Hematology/Oncology Outpatient Follow- up Note  Keisha Palacios 53 y o  female MRN: @ Encounter: [id number]        Date:  7/16/2019      Assessment / Plan:    Progressive IgG kappa multiple myeloma (dx 4/2019) from smoldering MM (dx 9/2017) in a patient with complicated medical history including diabetes mellitus type 1 insulin dependent, history of kidney and pancreatic transplant in 1998 with subsequent pancreatic insufficiency, worsening kidney function test, multiple episodes of herpes zoster, immunocompromised status   Bone marrow biopsy 4/2019 showed more progressive myeloma cells in the bone marrow about 35%, monosomy 13, hyperdiploidy with multiple chromosomes abnormalities   She has worsening renal insufficency        Due to her symptomatic neuropathy from diabetes, Ixazomib 4 mg p o  Weekly 3 weeks on 1 week off without dexamethasone instead of Velcade initiated along with acyclovir 400 mg p o  B i d  For  herpes zoster prophylaxis initiated  6/13/2019        2  Rash, likely secondary to Ninlaro  Ninlaro to be discontinued  She has an appointment with Dermatology later today  She is asked to have repeat myeloma labs to assess response to 1 cycle of Ninlaro  If there is been response, we discussed switching to Velcade 0 7 milligrams/meter square 3 on 1 off is this has the same mechanism of action as Velcade  Patient does have peripheral neuropathy secondary to her diabetes in a stocking-glove pattern, fingers of her hands and mid shins of her legs  If she has not had response switching to Revlimid 5 milligrams PO days 1 through 21 of 38 day cycle could be considered  Will need to monitor renal function closely as well as for any rash as this side effect can also be seen with Revlimid      Patient was seen and treatment plan made with Dr Shahid Huynh is a 39-year-old  female with history of type 1 diabetes mellitus, diabetic neuropathy, diabetic nephropathy, status post pancreas and kidney transplant in 1998 at [address], amputation of the right big toe, cholecystectomy, vitamin-D deficiency, exacerbation of diabetes mellitus while she is on insulin, possible pancreas burn out, herpes zoster x2, who was found to have abnormal serum protein electrophoresis in August 2017 with IgG kappa a peak of 0 4 grams/deciliter and peak 2  of 2 27 grams/deciliter, mildly elevated kappa light chain, chronic kidney disease     Status post bone marrow biopsy 9/20/2017 showed 15-20% plasma cells, normal cytogenetics and normal FISH panel for myeloma   Bone skeletal survey showed no evidence of lytic lesions   She was diagnosed with smoldering multiple myeloma   She was meet with Dr Gloria Brown from [facility] to continue to observe     She has diabetic neuropathy grade 3, uses a cane for ambulation, herpes zoster in February 2019      On 04/10/2019 kappa light chain 69, lambda light chain 17 2 with a ratio of 4 0 which increased from ratio of 2 8 in January 2019  Serum protein electrophoresis showed M protein of 0 5 and 2 67 IgG kappa, IgG 2 9 g, IgA 62, IgM 31, creatinine 2 0, total protein 9, albumin 3, calcium 8 6     Repeat bone marrow biopsy on 04/17/2019 showed progressive multiple myeloma with plasma cells in the range of 35%, now with multiple chromosomal abnormalities including 13 Q deletion, 11 Q, trisomy 11, gain of 17 PO trisomy 17, hyperdiploid with cane of additional copies of chromosome 5, 6, 7, 9, 11, 15, 17, 18, large deletion involving most of the chromosome 13 (monosomy 13) 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        Interval History:  Medrol DP given and she was advised to hold Ninlaro  Called 2 days later, reports rash worse  Advised to d/c medrol DP and she was referred to dermatology        Test Results:        Labs:   Lab Results   Component Value Date    HGB 9 9 (L) 07/12/2019    HCT 32 0 (L) 07/12/2019     (H) 07/12/2019  07/12/2019    WBC 9 86 07/12/2019    NRBC 0 07/12/2019     Lab Results   Component Value Date     (L) 01/06/2016    K 3 9 07/12/2019     07/12/2019    CO2 24 07/12/2019    ANIONGAP 6 01/06/2016    BUN 62 (H) 07/12/2019    CREATININE 2 20 (H) 07/12/2019    GLUCOSE 103 09/13/2017    GLUF 92 07/12/2019    CALCIUM 9 1 07/12/2019    CORRECTEDCA 11 7 (H) 08/28/2018    AST 14 07/12/2019    ALT 28 07/12/2019    ALKPHOS 96 07/12/2019    PROT 8 3 (H) 12/14/2015    BILITOT 0 27 12/14/2015    EGFR 25 07/12/2019       Imaging: No results found  ROS:  As mentioned in HPI & Interval History otherwise 14 point ROS negative  Allergies: Allergies   Allergen Reactions    Cefadroxil     Morphine Other (See Comments)     Other reaction(s): Other (See Comments)  projectile vomiting    Morphine And Related GI Intolerance    Myrbetriq [Mirabegron] Hives    Penicillins Hives     Other reaction(s): Other (See Comments)  Respiratory Distress,hives  Tolerates cefazolin     Current Medications: Reviewed  PMH/FH/SH:  Reviewed      Physical Exam:    There is no height or weight on file to calculate BSA  Ht Readings from Last 3 Encounters:   07/09/19 5' 8" (1 727 m)   06/18/19 5' 8" (1 727 m)   06/11/19 5' 8" (1 727 m)        Wt Readings from Last 3 Encounters:   07/09/19 104 kg (230 lb)   06/18/19 102 kg (224 lb 9 6 oz)   06/11/19 102 kg (225 lb)        Temp Readings from Last 3 Encounters:   07/09/19 (!) 97 2 °F (36 2 °C) (Tympanic)   05/29/19 98 9 °F (37 2 °C) (Tympanic)   05/06/19 97 7 °F (36 5 °C)        BP Readings from Last 3 Encounters:   07/09/19 162/52   06/18/19 148/62   06/11/19 146/66           Physical Exam   Constitutional: She is oriented to person, place, and time  She appears well-developed and well-nourished  No distress  HENT:   Head: Normocephalic and atraumatic  Mouth/Throat: No oropharyngeal exudate  Eyes: Pupils are equal, round, and reactive to light   Conjunctivae are normal    Neck: Normal range of motion  Neck supple  No tracheal deviation present  Cardiovascular: Normal rate and regular rhythm  Exam reveals no gallop and no friction rub  No murmur heard  Pulmonary/Chest: Effort normal and breath sounds normal  No respiratory distress  She has no wheezes  She has no rales  She exhibits no tenderness  Abdominal: Soft  She exhibits no distension  There is no tenderness  Lymphadenopathy:     She has no cervical adenopathy  Neurological: She is alert and oriented to person, place, and time  Skin: Skin is warm and dry  Rash (On chest   Multiple excoriations) noted  She is not diaphoretic  No erythema  No pallor  Psychiatric: She has a normal mood and affect  Her behavior is normal  Judgment and thought content normal    Vitals reviewed          Emergency Contacts:    Abram sims, [phone],

## 2019-07-16 ENCOUNTER — OFFICE VISIT (OUTPATIENT)
Dept: HEMATOLOGY ONCOLOGY | Facility: CLINIC | Age: 54
End: 2019-07-16
Payer: MEDICARE

## 2019-07-16 ENCOUNTER — APPOINTMENT (OUTPATIENT)
Dept: LAB | Age: 54
End: 2019-07-16
Payer: MEDICARE

## 2019-07-16 VITALS
HEART RATE: 66 BPM | DIASTOLIC BLOOD PRESSURE: 72 MMHG | SYSTOLIC BLOOD PRESSURE: 180 MMHG | BODY MASS INDEX: 34.71 KG/M2 | HEIGHT: 68 IN | TEMPERATURE: 97.9 F | OXYGEN SATURATION: 96 % | WEIGHT: 229 LBS | RESPIRATION RATE: 16 BRPM

## 2019-07-16 DIAGNOSIS — N18.4 CKD (CHRONIC KIDNEY DISEASE), STAGE IV (HCC): ICD-10-CM

## 2019-07-16 DIAGNOSIS — C90.00 MULTIPLE MYELOMA NOT HAVING ACHIEVED REMISSION (HCC): ICD-10-CM

## 2019-07-16 DIAGNOSIS — C90.00 MULTIPLE MYELOMA NOT HAVING ACHIEVED REMISSION (HCC): Primary | ICD-10-CM

## 2019-07-16 DIAGNOSIS — R21 RASH: ICD-10-CM

## 2019-07-16 DIAGNOSIS — Z94.0 RENAL TRANSPLANT RECIPIENT: ICD-10-CM

## 2019-07-16 DIAGNOSIS — D89.2 PARAPROTEINEMIA: ICD-10-CM

## 2019-07-16 LAB
IGA SERPL-MCNC: 66 MG/DL (ref 70–400)
IGG SERPL-MCNC: 2880 MG/DL (ref 700–1600)
IGM SERPL-MCNC: 25 MG/DL (ref 40–230)
MAGNESIUM SERPL-MCNC: 2.3 MG/DL (ref 1.6–2.6)

## 2019-07-16 PROCEDURE — 84165 PROTEIN E-PHORESIS SERUM: CPT | Performed by: PHYSICIAN ASSISTANT

## 2019-07-16 PROCEDURE — 82784 ASSAY IGA/IGD/IGG/IGM EACH: CPT | Performed by: PHYSICIAN ASSISTANT

## 2019-07-16 PROCEDURE — 36415 COLL VENOUS BLD VENIPUNCTURE: CPT | Performed by: PHYSICIAN ASSISTANT

## 2019-07-16 PROCEDURE — 83883 ASSAY NEPHELOMETRY NOT SPEC: CPT

## 2019-07-16 PROCEDURE — 84165 PROTEIN E-PHORESIS SERUM: CPT | Performed by: PATHOLOGY

## 2019-07-16 PROCEDURE — 83735 ASSAY OF MAGNESIUM: CPT

## 2019-07-16 PROCEDURE — 99214 OFFICE O/P EST MOD 30 MIN: CPT | Performed by: PHYSICIAN ASSISTANT

## 2019-07-17 DIAGNOSIS — E83.39 HYPERPHOSPHATEMIA: Primary | ICD-10-CM

## 2019-07-17 LAB
ALBUMIN SERPL ELPH-MCNC: 3.41 G/DL (ref 3.5–5)
ALBUMIN SERPL ELPH-MCNC: 39.7 % (ref 52–65)
ALPHA1 GLOB SERPL ELPH-MCNC: 0.37 G/DL (ref 0.1–0.4)
ALPHA1 GLOB SERPL ELPH-MCNC: 4.3 % (ref 2.5–5)
ALPHA2 GLOB SERPL ELPH-MCNC: 0.87 G/DL (ref 0.4–1.2)
ALPHA2 GLOB SERPL ELPH-MCNC: 10.1 % (ref 7–13)
BETA GLOB ABNORMAL SERPL ELPH-MCNC: 0.38 G/DL (ref 0.4–0.8)
BETA1 GLOB SERPL ELPH-MCNC: 4.4 % (ref 5–13)
BETA2 GLOB SERPL ELPH-MCNC: 10.5 % (ref 2–8)
BETA2+GAMMA GLOB SERPL ELPH-MCNC: 0.9 G/DL (ref 0.2–0.5)
GAMMA GLOB ABNORMAL SERPL ELPH-MCNC: 2.67 G/DL (ref 0.5–1.6)
GAMMA GLOB SERPL ELPH-MCNC: 31 % (ref 12–22)
IGG/ALB SER: 0.66 {RATIO} (ref 1.1–1.8)
KAPPA LC FREE SER-MCNC: 89.7 MG/L (ref 3.3–19.4)
KAPPA LC FREE/LAMBDA FREE SER: 3.92 {RATIO} (ref 0.26–1.65)
LAMBDA LC FREE SERPL-MCNC: 22.9 MG/L (ref 5.7–26.3)
M PEAK ID 2: 29.8 %
M PROTEIN 1 MFR SERPL ELPH: 6.3 %
M PROTEIN 1 SERPL ELPH-MCNC: 0.54 G/DL
M PROTEIN 2 SERPL ELPH-MCNC: 2.56 G/DL
PROT PATTERN SERPL ELPH-IMP: ABNORMAL
PROT SERPL-MCNC: 8.6 G/DL (ref 6.4–8.2)

## 2019-07-17 NOTE — TELEPHONE ENCOUNTER
----- Message from Carmen Mclaughlin MD sent at 7/17/2019  3:37 PM EDT -----  Hello    Patient normally is followed up by Ms Vicky Griffin  Can the patient be advised that the renal function is relatively stable  Electrolytes are stable   -phosphorus is still rising   -can the patient be started on sevelamer 1 tablet 3 times a day with meals, dispense 90 tablets, refill 3-I am not sure if this will be covered by the patient's insurance?   So we can send a prescription for this to the pharmacy so they can see if it is covered  -repeat BMP and phosphorus in 2 weeks  -tacrolimus level is still in process    Thank you    np

## 2019-07-17 NOTE — RESULT ENCOUNTER NOTE
Hello    Patient normally is followed up by Ms Mel Gardner  Can the patient be advised that the renal function is relatively stable  Electrolytes are stable   -phosphorus is still rising   -can the patient be started on sevelamer 1 tablet 3 times a day with meals, dispense 90 tablets, refill 3-I am not sure if this will be covered by the patient's insurance?   So we can send a prescription for this to the pharmacy so they can see if it is covered  -repeat BMP and phosphorus in 2 weeks  -tacrolimus level is still in process    Thank you    np

## 2019-07-18 ENCOUNTER — TELEPHONE (OUTPATIENT)
Dept: HEMATOLOGY ONCOLOGY | Facility: CLINIC | Age: 54
End: 2019-07-18

## 2019-07-18 DIAGNOSIS — Z94.0 RENAL TRANSPLANT RECIPIENT: Primary | ICD-10-CM

## 2019-07-18 RX ORDER — SEVELAMER CARBONATE 800 MG/1
800 TABLET, FILM COATED ORAL
Qty: 270 TABLET | Refills: 3 | Status: SHIPPED | OUTPATIENT
Start: 2019-07-18 | End: 2020-01-02 | Stop reason: HOSPADM

## 2019-07-18 NOTE — TELEPHONE ENCOUNTER
Patient called to notify JODI Swift that her rash/lesions are resolving  She continues with steroids and has 6 days left  Patient reports that pruritus has resolved and the lesions on her chest and arms are drying up  She is questioning what treatment she will be on since her Margurite Abo was discontinued    Call back # [phone]

## 2019-07-18 NOTE — TELEPHONE ENCOUNTER
Returned tc spoke with pt explained going forward per [facility] PA you will start Revlimid  Will need to come in for a nursing visit to complete forms and teaching of the medication    Pt states she is available tomorrow around 2pm  She plans to come into [facility] office around 2pm

## 2019-07-19 ENCOUNTER — TELEPHONE (OUTPATIENT)
Dept: HEMATOLOGY ONCOLOGY | Facility: CLINIC | Age: 54
End: 2019-07-19

## 2019-07-19 NOTE — TELEPHONE ENCOUNTER
Met with pt reviewed revlimid patient - physician agreement form and pt signed  Also reviewed and gave revlimid onoclink teaching packet  Pt states she understands and will call with any questions

## 2019-07-20 DIAGNOSIS — N18.4 CKD (CHRONIC KIDNEY DISEASE), STAGE IV (HCC): ICD-10-CM

## 2019-07-20 DIAGNOSIS — I10 ESSENTIAL HYPERTENSION: ICD-10-CM

## 2019-07-20 RX ORDER — HYDRALAZINE HYDROCHLORIDE 50 MG/1
TABLET, FILM COATED ORAL
Qty: 270 TABLET | Refills: 0 | Status: SHIPPED | OUTPATIENT
Start: 2019-07-20 | End: 2019-10-18 | Stop reason: SDUPTHER

## 2019-07-20 RX ORDER — FUROSEMIDE 20 MG/1
TABLET ORAL
Qty: 90 TABLET | Refills: 0 | Status: SHIPPED | OUTPATIENT
Start: 2019-07-20 | End: 2019-09-13 | Stop reason: HOSPADM

## 2019-07-29 NOTE — TELEPHONE ENCOUNTER
Patient called back because she still has not received Revlimid  She states she is getting very anxious and wants to get started treatment

## 2019-07-30 ENCOUNTER — TELEPHONE (OUTPATIENT)
Dept: HEMATOLOGY ONCOLOGY | Facility: CLINIC | Age: 54
End: 2019-07-30

## 2019-07-30 ENCOUNTER — DOCUMENTATION (OUTPATIENT)
Dept: HEMATOLOGY ONCOLOGY | Facility: CLINIC | Age: 54
End: 2019-07-30

## 2019-07-30 DIAGNOSIS — C90.00 MULTIPLE MYELOMA NOT HAVING ACHIEVED REMISSION (HCC): Primary | ICD-10-CM

## 2019-07-30 NOTE — TELEPHONE ENCOUNTER
Returned tc to pt  Explained per finance dept  revlimid will be filled by [facility]  Rx sent to dr Marc Harrell to sign

## 2019-07-30 NOTE — PROGRESS NOTES
7/29/2019  Received notification from clinical pt will be starting revlimid 5 mg    Pt has [facility]  ID #[id number]  BIN #[id number]  PCN #[id number]  GRP # [id number]    Need dosing clarification    7/30/2019  Received dosing clarification revlimid 5 mg po qd on days 1-21 every 28 days    Submitted for auth through cover my meds  Received approval   Per approval letter [place] #[id number] is valid from 7/29/2019 through 12/31/2019  Notified clinical, [facility], and financial   Per [facility], no copay but this will have to go through [facility]  Forwarded the patient information to [facility]

## 2019-07-30 NOTE — TELEPHONE ENCOUNTER
Call transferred from Alissa Leo CSA from patient stating she received a call from [facility] [phone] stating her Revlimid was "approved" then pharmacist started to ask questions about dosing and a script  Patient advised pharm to call our office instead  She is concerned that the script is not at [facility]  She does have a visit on Friday with Dr Ariana Iglesias so she is asking if you can reach out to pharmacy prior to her visit on Friday

## 2019-07-31 ENCOUNTER — TRANSCRIBE ORDERS (OUTPATIENT)
Dept: ADMINISTRATIVE | Age: 54
End: 2019-07-31

## 2019-07-31 ENCOUNTER — LAB (OUTPATIENT)
Dept: LAB | Age: 54
End: 2019-07-31
Payer: MEDICARE

## 2019-07-31 DIAGNOSIS — Z94.0 KIDNEY REPLACED BY TRANSPLANT: Primary | ICD-10-CM

## 2019-07-31 DIAGNOSIS — Z94.0 KIDNEY REPLACED BY TRANSPLANT: ICD-10-CM

## 2019-07-31 DIAGNOSIS — Z94.0 RENAL TRANSPLANT RECIPIENT: ICD-10-CM

## 2019-07-31 LAB
ALBUMIN SERPL BCP-MCNC: 2.9 G/DL (ref 3.5–5)
ALP SERPL-CCNC: 98 U/L (ref 46–116)
ALT SERPL W P-5'-P-CCNC: 22 U/L (ref 12–78)
ANION GAP SERPL CALCULATED.3IONS-SCNC: 8 MMOL/L (ref 4–13)
AST SERPL W P-5'-P-CCNC: 11 U/L (ref 5–45)
BACTERIA UR QL AUTO: ABNORMAL /HPF
BILIRUB SERPL-MCNC: 0.29 MG/DL (ref 0.2–1)
BILIRUB UR QL STRIP: NEGATIVE
BUN SERPL-MCNC: 45 MG/DL (ref 5–25)
CALCIUM SERPL-MCNC: 8.8 MG/DL (ref 8.3–10.1)
CHLORIDE SERPL-SCNC: 107 MMOL/L (ref 100–108)
CLARITY UR: ABNORMAL
CO2 SERPL-SCNC: 22 MMOL/L (ref 21–32)
COLOR UR: YELLOW
CREAT SERPL-MCNC: 2.21 MG/DL (ref 0.6–1.3)
CREAT UR-MCNC: 51 MG/DL
GFR SERPL CREATININE-BSD FRML MDRD: 24 ML/MIN/1.73SQ M
GLUCOSE P FAST SERPL-MCNC: 77 MG/DL (ref 65–99)
GLUCOSE UR STRIP-MCNC: NEGATIVE MG/DL
HGB UR QL STRIP.AUTO: NEGATIVE
KETONES UR STRIP-MCNC: NEGATIVE MG/DL
LEUKOCYTE ESTERASE UR QL STRIP: ABNORMAL
MAGNESIUM SERPL-MCNC: 2.3 MG/DL (ref 1.6–2.6)
NITRITE UR QL STRIP: NEGATIVE
NON-SQ EPI CELLS URNS QL MICRO: ABNORMAL /HPF
PH UR STRIP.AUTO: 7 [PH]
PHOSPHATE SERPL-MCNC: 4.3 MG/DL (ref 2.7–4.5)
POTASSIUM SERPL-SCNC: 3.9 MMOL/L (ref 3.5–5.3)
PROT SERPL-MCNC: 9 G/DL (ref 6.4–8.2)
PROT UR STRIP-MCNC: ABNORMAL MG/DL
PROT UR-MCNC: 111 MG/DL
PROT/CREAT UR: 2.18 MG/G{CREAT} (ref 0–0.1)
RBC #/AREA URNS AUTO: ABNORMAL /HPF
SODIUM SERPL-SCNC: 137 MMOL/L (ref 136–145)
SP GR UR STRIP.AUTO: 1.01 (ref 1–1.03)
UROBILINOGEN UR QL STRIP.AUTO: 0.2 E.U./DL
WBC #/AREA URNS AUTO: ABNORMAL /HPF

## 2019-07-31 PROCEDURE — 84100 ASSAY OF PHOSPHORUS: CPT

## 2019-07-31 PROCEDURE — 84156 ASSAY OF PROTEIN URINE: CPT | Performed by: INTERNAL MEDICINE

## 2019-07-31 PROCEDURE — 83735 ASSAY OF MAGNESIUM: CPT

## 2019-07-31 PROCEDURE — 80197 ASSAY OF TACROLIMUS: CPT

## 2019-07-31 PROCEDURE — 80053 COMPREHEN METABOLIC PANEL: CPT

## 2019-07-31 PROCEDURE — 82570 ASSAY OF URINE CREATININE: CPT | Performed by: INTERNAL MEDICINE

## 2019-07-31 PROCEDURE — 81001 URINALYSIS AUTO W/SCOPE: CPT | Performed by: INTERNAL MEDICINE

## 2019-07-31 PROCEDURE — 36415 COLL VENOUS BLD VENIPUNCTURE: CPT

## 2019-07-31 RX ORDER — LENALIDOMIDE 5 MG/1
5 CAPSULE ORAL DAILY
Qty: 21 CAPSULE | Refills: 0 | Status: SHIPPED | OUTPATIENT
Start: 2019-07-31 | End: 2019-08-17 | Stop reason: HOSPADM

## 2019-08-01 LAB — TACROLIMUS BLD-MCNC: 3.9 NG/ML (ref 2–20)

## 2019-08-02 ENCOUNTER — TELEPHONE (OUTPATIENT)
Dept: NEPHROLOGY | Facility: CLINIC | Age: 54
End: 2019-08-02

## 2019-08-02 ENCOUNTER — OFFICE VISIT (OUTPATIENT)
Dept: HEMATOLOGY ONCOLOGY | Facility: CLINIC | Age: 54
End: 2019-08-02
Payer: MEDICARE

## 2019-08-02 VITALS
TEMPERATURE: 97.8 F | RESPIRATION RATE: 14 BRPM | HEART RATE: 66 BPM | SYSTOLIC BLOOD PRESSURE: 152 MMHG | DIASTOLIC BLOOD PRESSURE: 60 MMHG | BODY MASS INDEX: 34.78 KG/M2 | WEIGHT: 229.5 LBS | HEIGHT: 68 IN

## 2019-08-02 DIAGNOSIS — N18.4 CHRONIC KIDNEY DISEASE, STAGE 4 (SEVERE) (HCC): Primary | ICD-10-CM

## 2019-08-02 DIAGNOSIS — C90.00 MULTIPLE MYELOMA NOT HAVING ACHIEVED REMISSION (HCC): ICD-10-CM

## 2019-08-02 PROCEDURE — 99214 OFFICE O/P EST MOD 30 MIN: CPT | Performed by: INTERNAL MEDICINE

## 2019-08-02 NOTE — TELEPHONE ENCOUNTER
Patient has been on and off steroids over the past month due to reaction to chemotherapy  Chemotherapy has been discontinued in being changed per Hematology note  Has ulcer on foot which is being treated with antibiotics  Reviewed the patient's blood pressures and also lab work      Spoke to the patient over the phone    Advised the patient given the weight gain and edema, to increase furosemide to 40 mg in the morning and 20 mg in the evening and then reduced to 20 mg twice a day once the weight improves by 3 lb    Patient will call next week with the effect of this regimen    May need renal ultrasound    Continue monthly lab work

## 2019-08-02 NOTE — PROGRESS NOTES
Hematology Outpatient Follow - Up Note  Greyson Cuenca 53 y o  female MRN: @ Encounter: [id number]        Date:  8/2/2019        Assessment/ Plan:    Progressive IgG kappa multiple myeloma diagnosed in 04/2019 from smoldering myeloma diagnosed in 09/2017 the patient has complicated medical history including kidney and pancreatic transplant in 1998 with subsequent pancreatic insufficiency, worsening kidney function, diabetes mellitus type 1 insulin dependent, bone marrow biopsy on 04/2019 showed more progressive myeloma with plasma cells about 35%, monosomy 13, hyperdeploidy    No response toe Ixazomib    She also had severe grade 3 rash secondary to Ixazomib    We discontinued Ixazomib    Await for initiating lenalidomide 5 mg p o   Daily 3 weeks on 1 week off, the patient to finish Keflex the next 7 days and initiate lenalidomide    Follow-up in 5 weeks with CBC, CMP, SPEP, free light chain, quantitative immunoglobulins          HPI:  Antelmo Gross is a 50-year-old  female with history of type 1 diabetes mellitus, diabetic neuropathy, diabetic nephropathy, status post pancreas and kidney transplant in 1998 at [facility], amputation of the right big toe, cholecystectomy, vitamin-D deficiency, exacerbation of diabetes mellitus while she is on insulin, possible pancreas burn out, herpes zoster x2, who was found to have abnormal serum protein electrophoresis in August 2017 with IgG kappa a peak of 0 4 grams/deciliter and peak 2  of 2 27 grams/deciliter, mildly elevated kappa light chain, chronic kidney disease     Status post bone marrow biopsy 9/20/2017 showed 15-20% plasma cells, normal cytogenetics and normal FISH panel for myeloma   Bone skeletal survey showed no evidence of lytic lesions   She was diagnosed with smoldering multiple myeloma   She was meet with Dr Dharmesh Marr from [facility] to continue to observe     She has diabetic neuropathy grade 3, uses a cane for ambulation, herpes zoster in February 2019      On 04/10/2019 kappa light chain 69, lambda light chain 17 2 with a ratio of 4 0 which increased from ratio of 2 8 in January 2019  Serum protein electrophoresis showed M protein of 0 5 and 2 67 IgG kappa, IgG 2 9 g, IgA 62, IgM 31, creatinine 2 0, total protein 9, albumin 3, calcium 8 6     Repeat bone marrow biopsy on 04/17/2019 showed progressive multiple myeloma with plasma cells in the range of 35%, now with multiple chromosomal abnormalities including 13 Q deletion, 11 Q, trisomy 11, gain of 17 PO trisomy 17, hyperdiploid with cane of additional copies of chromosome 5, 6, 7, 9, 11, 15, 17, 18, large deletion involving most of the chromosome 13 (monosomy 13) 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    Initiated on Ixazomib with severe pruritus requiring discontinuation in June 2019    Lenalidomide 5 mg p o  Daily 3 weeks on 1 week off was approved    Interval History:  Ulcer of the right large toe managed by Podiatry, currently on Keflex for 10 days                Test Results:    Imaging: No results found      Labs:   Lab Results   Component Value Date    WBC 9 86 07/12/2019    HGB 9 9 (L) 07/12/2019    HCT 32 0 (L) 07/12/2019     (H) 07/12/2019     07/12/2019     Lab Results   Component Value Date     (L) 01/06/2016    K 3 9 07/31/2019     07/31/2019    CO2 22 07/31/2019    ANIONGAP 6 01/06/2016    BUN 45 (H) 07/31/2019    CREATININE 2 21 (H) 07/31/2019    GLUCOSE 103 09/13/2017    GLUF 77 07/31/2019    CALCIUM 8 8 07/31/2019    CORRECTEDCA 11 7 (H) 08/28/2018    AST 11 07/31/2019    ALT 22 07/31/2019    ALKPHOS 98 07/31/2019    PROT 8 3 (H) 12/14/2015    BILITOT 0 27 12/14/2015    EGFR 24 07/31/2019       Lab Results   Component Value Date    IRON 52 01/24/2019    TIBC 249 (L) 01/24/2019    FERRITIN 42 01/24/2019       Lab Results   Component Value Date    WTUSWNKI56 438 07/14/2014         ROS:   Review of Systems   Constitutional: Positive for fatigue  Negative for activity change, appetite change, diaphoresis, fever and unexpected weight change  HENT: Negative for facial swelling, hearing loss, rhinorrhea, sinus pressure, sinus pain, sneezing, sore throat and tinnitus  Eyes: Negative for photophobia, pain, discharge, redness, itching and visual disturbance  Respiratory: Negative for apnea, cough, chest tightness and shortness of breath  Cardiovascular: Negative for chest pain, palpitations and leg swelling  Gastrointestinal: Negative for abdominal distention, abdominal pain, blood in stool, constipation, diarrhea, nausea, rectal pain and vomiting  Endocrine: Negative for cold intolerance, heat intolerance, polydipsia and polyphagia  Genitourinary: Negative for difficulty urinating, dyspareunia, frequency, hematuria, pelvic pain and urgency  Musculoskeletal: Negative for arthralgias, back pain, gait problem, joint swelling and myalgias  Skin: Negative for color change, pallor and rash  Allergic/Immunologic: Negative for environmental allergies and food allergies  Neurological: Positive for numbness  Negative for dizziness, tremors, seizures, syncope, speech difficulty and headaches  Hematological: Negative for adenopathy  Does not bruise/bleed easily  Psychiatric/Behavioral: Negative for agitation, confusion, dysphoric mood, hallucinations and suicidal ideas  Current Medications: Reviewed  Allergies: Reviewed  PMH/FH/SH:  Reviewed      Physical Exam:    Body surface area is 2 17 meters squared      Wt Readings from Last 3 Encounters:   08/02/19 104 kg (229 lb 8 oz)   07/16/19 104 kg (229 lb)   07/09/19 104 kg (230 lb)        Temp Readings from Last 3 Encounters:   08/02/19 97 8 °F (36 6 °C)   07/16/19 97 9 °F (36 6 °C) (Oral)   07/09/19 (!) 97 2 °F (36 2 °C) (Tympanic)        BP Readings from Last 3 Encounters:   08/02/19 152/60   07/16/19 (!) 180/72   07/09/19 162/52         Pulse Readings from Last 3 Encounters: 19 66   19 66   19 77        Physical Exam   Constitutional: She is oriented to person, place, and time  She appears well-developed and well-nourished  No distress  Using a cane for walking   HENT:   Head: Normocephalic and atraumatic  Eyes: Conjunctivae are normal    Neck: Normal range of motion  Neck supple  No tracheal deviation present  Cardiovascular: Normal rate and regular rhythm  Exam reveals no gallop and no friction rub  No murmur heard  Pulmonary/Chest: Effort normal and breath sounds normal  No respiratory distress  She has no wheezes  She has no rales  She exhibits no tenderness  Abdominal: Soft  She exhibits no distension  There is no tenderness  Lymphadenopathy:     She has no cervical adenopathy  Neurological: She is alert and oriented to person, place, and time  Skin: Skin is warm and dry  Rash (Of the anterior chest wall area improved significantly from the previous visit) noted  She is not diaphoretic  No erythema  No pallor  Psychiatric: She has a normal mood and affect  Her behavior is normal  Judgment and thought content normal    Vitals reviewed  ECO    Goals and Barriers:  Current Goal: Minimize effects of disease  Barriers: None  Patient's Capacity to Self Care:  Patient is able to self care      Code Status: [unfilled]

## 2019-08-09 LAB
LEFT EYE DIABETIC RETINOPATHY: NORMAL
RIGHT EYE DIABETIC RETINOPATHY: NORMAL

## 2019-08-12 ENCOUNTER — HOSPITAL ENCOUNTER (OUTPATIENT)
Dept: RADIOLOGY | Age: 54
Discharge: HOME | DRG: 683 | End: 2019-08-12
Payer: MEDICARE

## 2019-08-12 VITALS — WEIGHT: 226 LBS | HEIGHT: 68 IN | BODY MASS INDEX: 34.25 KG/M2

## 2019-08-12 DIAGNOSIS — Z12.31 ENCOUNTER FOR SCREENING MAMMOGRAM FOR MALIGNANT NEOPLASM OF BREAST: ICD-10-CM

## 2019-08-12 PROCEDURE — 77063 BREAST TOMOSYNTHESIS BI: CPT

## 2019-08-12 PROCEDURE — 77067 SCR MAMMO BI INCL CAD: CPT

## 2019-08-14 ENCOUNTER — TELEPHONE (OUTPATIENT)
Dept: HEMATOLOGY ONCOLOGY | Facility: CLINIC | Age: 54
End: 2019-08-14

## 2019-08-14 ENCOUNTER — APPOINTMENT (EMERGENCY)
Dept: RADIOLOGY | Facility: HOSPITAL | Age: 54
DRG: 683 | End: 2019-08-14
Payer: MEDICARE

## 2019-08-14 ENCOUNTER — LAB (OUTPATIENT)
Dept: LAB | Age: 54
DRG: 683 | End: 2019-08-14
Payer: MEDICARE

## 2019-08-14 ENCOUNTER — TELEPHONE (OUTPATIENT)
Dept: NEPHROLOGY | Facility: CLINIC | Age: 54
End: 2019-08-14

## 2019-08-14 ENCOUNTER — HOSPITAL ENCOUNTER (INPATIENT)
Facility: HOSPITAL | Age: 54
LOS: 3 days | Discharge: HOME | DRG: 683 | End: 2019-08-17
Attending: EMERGENCY MEDICINE | Admitting: INTERNAL MEDICINE
Payer: MEDICARE

## 2019-08-14 DIAGNOSIS — N18.4 CKD (CHRONIC KIDNEY DISEASE), STAGE IV (HCC): ICD-10-CM

## 2019-08-14 DIAGNOSIS — R31.9 HEMATURIA: ICD-10-CM

## 2019-08-14 DIAGNOSIS — N17.9 ACUTE KIDNEY INJURY (HCC): Primary | ICD-10-CM

## 2019-08-14 DIAGNOSIS — E78.5 HYPERLIPIDEMIA, UNSPECIFIED HYPERLIPIDEMIA TYPE: ICD-10-CM

## 2019-08-14 DIAGNOSIS — Z94.0 RENAL TRANSPLANT RECIPIENT: ICD-10-CM

## 2019-08-14 PROBLEM — N18.30 ACUTE RENAL FAILURE SUPERIMPOSED ON STAGE 3 CHRONIC KIDNEY DISEASE (HCC): Status: ACTIVE | Noted: 2019-08-14

## 2019-08-14 PROBLEM — R21 SKIN RASH: Status: ACTIVE | Noted: 2019-08-14

## 2019-08-14 PROBLEM — R82.90 ABNORMAL URINALYSIS: Status: ACTIVE | Noted: 2019-08-14

## 2019-08-14 LAB
ALBUMIN SERPL BCP-MCNC: 3 G/DL (ref 3.5–5)
ALP SERPL-CCNC: 94 U/L (ref 46–116)
ALT SERPL W P-5'-P-CCNC: 18 U/L (ref 12–78)
ANION GAP SERPL CALCULATED.3IONS-SCNC: 10 MMOL/L (ref 4–13)
ANION GAP SERPL CALCULATED.3IONS-SCNC: 11 MMOL/L (ref 4–13)
AST SERPL W P-5'-P-CCNC: 9 U/L (ref 5–45)
BACTERIA UR QL AUTO: ABNORMAL /HPF
BACTERIA UR QL AUTO: ABNORMAL /HPF
BASOPHILS # BLD AUTO: 0.02 THOUSANDS/ΜL (ref 0–0.1)
BASOPHILS NFR BLD AUTO: 0 % (ref 0–1)
BILIRUB SERPL-MCNC: 0.36 MG/DL (ref 0.2–1)
BILIRUB UR QL STRIP: NEGATIVE
BILIRUB UR QL STRIP: NEGATIVE
BUN SERPL-MCNC: 51 MG/DL (ref 5–25)
BUN SERPL-MCNC: 55 MG/DL (ref 5–25)
CALCIUM SERPL-MCNC: 8.8 MG/DL (ref 8.3–10.1)
CALCIUM SERPL-MCNC: 9 MG/DL (ref 8.3–10.1)
CHLORIDE SERPL-SCNC: 105 MMOL/L (ref 100–108)
CHLORIDE SERPL-SCNC: 107 MMOL/L (ref 100–108)
CHOLEST SERPL-MCNC: 97 MG/DL (ref 50–200)
CLARITY UR: ABNORMAL
CLARITY UR: ABNORMAL
CO2 SERPL-SCNC: 22 MMOL/L (ref 21–32)
CO2 SERPL-SCNC: 22 MMOL/L (ref 21–32)
COLOR UR: YELLOW
COLOR UR: YELLOW
CREAT SERPL-MCNC: 2.46 MG/DL (ref 0.6–1.3)
CREAT SERPL-MCNC: 2.63 MG/DL (ref 0.6–1.3)
CREAT UR-MCNC: 42.6 MG/DL
EOSINOPHIL # BLD AUTO: 0.37 THOUSAND/ΜL (ref 0–0.61)
EOSINOPHIL NFR BLD AUTO: 5 % (ref 0–6)
ERYTHROCYTE [DISTWIDTH] IN BLOOD BY AUTOMATED COUNT: 14.6 % (ref 11.6–15.1)
ERYTHROCYTE [DISTWIDTH] IN BLOOD BY AUTOMATED COUNT: 14.8 % (ref 11.6–15.1)
GFR SERPL CREATININE-BSD FRML MDRD: 20 ML/MIN/1.73SQ M
GFR SERPL CREATININE-BSD FRML MDRD: 21 ML/MIN/1.73SQ M
GLUCOSE P FAST SERPL-MCNC: 122 MG/DL (ref 65–99)
GLUCOSE SERPL-MCNC: 112 MG/DL (ref 65–140)
GLUCOSE SERPL-MCNC: 123 MG/DL (ref 65–140)
GLUCOSE SERPL-MCNC: 153 MG/DL (ref 65–140)
GLUCOSE UR STRIP-MCNC: NEGATIVE MG/DL
GLUCOSE UR STRIP-MCNC: NEGATIVE MG/DL
HCT VFR BLD AUTO: 30.1 % (ref 34.8–46.1)
HCT VFR BLD AUTO: 30.3 % (ref 34.8–46.1)
HDLC SERPL-MCNC: 38 MG/DL (ref 40–60)
HGB BLD-MCNC: 9.4 G/DL (ref 11.5–15.4)
HGB BLD-MCNC: 9.5 G/DL (ref 11.5–15.4)
HGB UR QL STRIP.AUTO: ABNORMAL
HGB UR QL STRIP.AUTO: ABNORMAL
HYALINE CASTS #/AREA URNS LPF: ABNORMAL /LPF
IMM GRANULOCYTES # BLD AUTO: 0.06 THOUSAND/UL (ref 0–0.2)
IMM GRANULOCYTES NFR BLD AUTO: 1 % (ref 0–2)
KETONES UR STRIP-MCNC: NEGATIVE MG/DL
KETONES UR STRIP-MCNC: NEGATIVE MG/DL
LDLC SERPL CALC-MCNC: 41 MG/DL (ref 0–100)
LEUKOCYTE ESTERASE UR QL STRIP: ABNORMAL
LEUKOCYTE ESTERASE UR QL STRIP: ABNORMAL
LYMPHOCYTES # BLD AUTO: 1.11 THOUSANDS/ΜL (ref 0.6–4.47)
LYMPHOCYTES NFR BLD AUTO: 15 % (ref 14–44)
MAGNESIUM SERPL-MCNC: 2.1 MG/DL (ref 1.6–2.6)
MCH RBC QN AUTO: 30.6 PG (ref 26.8–34.3)
MCH RBC QN AUTO: 30.7 PG (ref 26.8–34.3)
MCHC RBC AUTO-ENTMCNC: 31.2 G/DL (ref 31.4–37.4)
MCHC RBC AUTO-ENTMCNC: 31.4 G/DL (ref 31.4–37.4)
MCV RBC AUTO: 98 FL (ref 82–98)
MCV RBC AUTO: 98 FL (ref 82–98)
MONOCYTES # BLD AUTO: 0.36 THOUSAND/ΜL (ref 0.17–1.22)
MONOCYTES NFR BLD AUTO: 5 % (ref 4–12)
NEUTROPHILS # BLD AUTO: 5.7 THOUSANDS/ΜL (ref 1.85–7.62)
NEUTS SEG NFR BLD AUTO: 74 % (ref 43–75)
NITRITE UR QL STRIP: POSITIVE
NITRITE UR QL STRIP: POSITIVE
NON-SQ EPI CELLS URNS QL MICRO: ABNORMAL /HPF
NON-SQ EPI CELLS URNS QL MICRO: ABNORMAL /HPF
NONHDLC SERPL-MCNC: 59 MG/DL
NRBC BLD AUTO-RTO: 0 /100 WBCS
PH UR STRIP.AUTO: 8 [PH]
PH UR STRIP.AUTO: 8 [PH]
PHOSPHATE SERPL-MCNC: 4.1 MG/DL (ref 2.7–4.5)
PLATELET # BLD AUTO: 206 THOUSANDS/UL (ref 149–390)
PLATELET # BLD AUTO: 211 THOUSANDS/UL (ref 149–390)
PMV BLD AUTO: 13 FL (ref 8.9–12.7)
PMV BLD AUTO: 13.6 FL (ref 8.9–12.7)
POTASSIUM SERPL-SCNC: 3.5 MMOL/L (ref 3.5–5.3)
POTASSIUM SERPL-SCNC: 3.6 MMOL/L (ref 3.5–5.3)
PROT SERPL-MCNC: 9.1 G/DL (ref 6.4–8.2)
PROT UR STRIP-MCNC: ABNORMAL MG/DL
PROT UR STRIP-MCNC: ABNORMAL MG/DL
PROT UR-MCNC: 112 MG/DL
PROT/CREAT UR: 2.63 MG/G{CREAT} (ref 0–0.1)
RBC # BLD AUTO: 3.07 MILLION/UL (ref 3.81–5.12)
RBC # BLD AUTO: 3.09 MILLION/UL (ref 3.81–5.12)
RBC #/AREA URNS AUTO: ABNORMAL /HPF
RBC #/AREA URNS AUTO: ABNORMAL /HPF
SODIUM SERPL-SCNC: 137 MMOL/L (ref 136–145)
SODIUM SERPL-SCNC: 140 MMOL/L (ref 136–145)
SP GR UR STRIP.AUTO: 1.01 (ref 1–1.03)
SP GR UR STRIP.AUTO: 1.01 (ref 1–1.03)
TACROLIMUS BLD-MCNC: 3.1 NG/ML (ref 2–20)
TACROLIMUS BLD-MCNC: 4.2 NG/ML (ref 2–20)
TRIGL SERPL-MCNC: 90 MG/DL
UROBILINOGEN UR QL STRIP.AUTO: 0.2 E.U./DL
UROBILINOGEN UR QL STRIP.AUTO: 0.2 E.U./DL
WBC # BLD AUTO: 7.62 THOUSAND/UL (ref 4.31–10.16)
WBC # BLD AUTO: 7.62 THOUSAND/UL (ref 4.31–10.16)
WBC #/AREA URNS AUTO: ABNORMAL /HPF
WBC #/AREA URNS AUTO: ABNORMAL /HPF

## 2019-08-14 PROCEDURE — 87186 SC STD MICRODIL/AGAR DIL: CPT | Performed by: EMERGENCY MEDICINE

## 2019-08-14 PROCEDURE — 80197 ASSAY OF TACROLIMUS: CPT

## 2019-08-14 PROCEDURE — 80053 COMPREHEN METABOLIC PANEL: CPT

## 2019-08-14 PROCEDURE — 76770 US EXAM ABDO BACK WALL COMP: CPT

## 2019-08-14 PROCEDURE — 80197 ASSAY OF TACROLIMUS: CPT | Performed by: EMERGENCY MEDICINE

## 2019-08-14 PROCEDURE — 87086 URINE CULTURE/COLONY COUNT: CPT | Performed by: EMERGENCY MEDICINE

## 2019-08-14 PROCEDURE — 80061 LIPID PANEL: CPT

## 2019-08-14 PROCEDURE — 84100 ASSAY OF PHOSPHORUS: CPT

## 2019-08-14 PROCEDURE — 82570 ASSAY OF URINE CREATININE: CPT | Performed by: INTERNAL MEDICINE

## 2019-08-14 PROCEDURE — 82948 REAGENT STRIP/BLOOD GLUCOSE: CPT

## 2019-08-14 PROCEDURE — 99285 EMERGENCY DEPT VISIT HI MDM: CPT

## 2019-08-14 PROCEDURE — 81001 URINALYSIS AUTO W/SCOPE: CPT | Performed by: INTERNAL MEDICINE

## 2019-08-14 PROCEDURE — 36415 COLL VENOUS BLD VENIPUNCTURE: CPT

## 2019-08-14 PROCEDURE — 99223 1ST HOSP IP/OBS HIGH 75: CPT | Performed by: INTERNAL MEDICINE

## 2019-08-14 PROCEDURE — 84156 ASSAY OF PROTEIN URINE: CPT | Performed by: INTERNAL MEDICINE

## 2019-08-14 PROCEDURE — 99284 EMERGENCY DEPT VISIT MOD MDM: CPT | Performed by: EMERGENCY MEDICINE

## 2019-08-14 PROCEDURE — 87077 CULTURE AEROBIC IDENTIFY: CPT | Performed by: EMERGENCY MEDICINE

## 2019-08-14 PROCEDURE — 80048 BASIC METABOLIC PNL TOTAL CA: CPT | Performed by: EMERGENCY MEDICINE

## 2019-08-14 PROCEDURE — 85027 COMPLETE CBC AUTOMATED: CPT

## 2019-08-14 PROCEDURE — 87799 DETECT AGENT NOS DNA QUANT: CPT | Performed by: EMERGENCY MEDICINE

## 2019-08-14 PROCEDURE — 85025 COMPLETE CBC W/AUTO DIFF WBC: CPT | Performed by: EMERGENCY MEDICINE

## 2019-08-14 PROCEDURE — 83735 ASSAY OF MAGNESIUM: CPT

## 2019-08-14 PROCEDURE — 81001 URINALYSIS AUTO W/SCOPE: CPT | Performed by: EMERGENCY MEDICINE

## 2019-08-14 RX ORDER — SODIUM BICARBONATE 650 MG/1
1300 TABLET ORAL 3 TIMES DAILY
Status: DISCONTINUED | OUTPATIENT
Start: 2019-08-14 | End: 2019-08-17 | Stop reason: HOSPADM

## 2019-08-14 RX ORDER — ONDANSETRON 2 MG/ML
4 INJECTION INTRAMUSCULAR; INTRAVENOUS EVERY 6 HOURS PRN
Status: DISCONTINUED | OUTPATIENT
Start: 2019-08-14 | End: 2019-08-17 | Stop reason: HOSPADM

## 2019-08-14 RX ORDER — ACETAMINOPHEN 325 MG/1
650 TABLET ORAL EVERY 6 HOURS PRN
Status: DISCONTINUED | OUTPATIENT
Start: 2019-08-14 | End: 2019-08-17 | Stop reason: HOSPADM

## 2019-08-14 RX ORDER — DIPHENHYDRAMINE HCL 25 MG
25 TABLET ORAL EVERY 6 HOURS PRN
Status: DISCONTINUED | OUTPATIENT
Start: 2019-08-14 | End: 2019-08-17 | Stop reason: HOSPADM

## 2019-08-14 RX ORDER — METOPROLOL TARTRATE 50 MG/1
50 TABLET, FILM COATED ORAL EVERY 12 HOURS SCHEDULED
Status: DISCONTINUED | OUTPATIENT
Start: 2019-08-14 | End: 2019-08-17 | Stop reason: HOSPADM

## 2019-08-14 RX ORDER — PRAVASTATIN SODIUM 80 MG/1
80 TABLET ORAL EVERY EVENING
Status: DISCONTINUED | OUTPATIENT
Start: 2019-08-14 | End: 2019-08-17 | Stop reason: HOSPADM

## 2019-08-14 RX ORDER — DULOXETIN HYDROCHLORIDE 60 MG/1
60 CAPSULE, DELAYED RELEASE ORAL 2 TIMES DAILY
Status: DISCONTINUED | OUTPATIENT
Start: 2019-08-14 | End: 2019-08-17 | Stop reason: HOSPADM

## 2019-08-14 RX ORDER — FOLIC ACID 1 MG/1
1000 TABLET ORAL DAILY
Status: DISCONTINUED | OUTPATIENT
Start: 2019-08-15 | End: 2019-08-17 | Stop reason: HOSPADM

## 2019-08-14 RX ORDER — SEVELAMER HYDROCHLORIDE 800 MG/1
800 TABLET, FILM COATED ORAL
Status: DISCONTINUED | OUTPATIENT
Start: 2019-08-15 | End: 2019-08-17 | Stop reason: HOSPADM

## 2019-08-14 RX ORDER — TACROLIMUS 1 MG/1
3 CAPSULE ORAL DAILY
Status: DISCONTINUED | OUTPATIENT
Start: 2019-08-15 | End: 2019-08-17 | Stop reason: HOSPADM

## 2019-08-14 RX ORDER — ARIPIPRAZOLE 15 MG/1
30 TABLET ORAL
Status: DISCONTINUED | OUTPATIENT
Start: 2019-08-14 | End: 2019-08-17 | Stop reason: HOSPADM

## 2019-08-14 RX ORDER — AMLODIPINE BESYLATE 5 MG/1
5 TABLET ORAL EVERY EVENING
Status: DISCONTINUED | OUTPATIENT
Start: 2019-08-14 | End: 2019-08-15

## 2019-08-14 RX ORDER — LEVOTHYROXINE SODIUM 0.12 MG/1
125 TABLET ORAL
Status: DISCONTINUED | OUTPATIENT
Start: 2019-08-15 | End: 2019-08-17 | Stop reason: HOSPADM

## 2019-08-14 RX ORDER — LORAZEPAM 1 MG/1
2 TABLET ORAL 3 TIMES DAILY PRN
Status: DISCONTINUED | OUTPATIENT
Start: 2019-08-14 | End: 2019-08-17 | Stop reason: HOSPADM

## 2019-08-14 RX ORDER — HEPARIN SODIUM 5000 [USP'U]/ML
5000 INJECTION, SOLUTION INTRAVENOUS; SUBCUTANEOUS EVERY 8 HOURS SCHEDULED
Status: DISCONTINUED | OUTPATIENT
Start: 2019-08-14 | End: 2019-08-17 | Stop reason: HOSPADM

## 2019-08-14 RX ORDER — BUSPIRONE HYDROCHLORIDE 5 MG/1
5 TABLET ORAL 2 TIMES DAILY
Status: DISCONTINUED | OUTPATIENT
Start: 2019-08-14 | End: 2019-08-17 | Stop reason: HOSPADM

## 2019-08-14 RX ORDER — MELATONIN
1000 DAILY
Status: DISCONTINUED | OUTPATIENT
Start: 2019-08-15 | End: 2019-08-17 | Stop reason: HOSPADM

## 2019-08-14 RX ORDER — HYDRALAZINE HYDROCHLORIDE 25 MG/1
50 TABLET, FILM COATED ORAL EVERY 8 HOURS SCHEDULED
Status: DISCONTINUED | OUTPATIENT
Start: 2019-08-14 | End: 2019-08-16

## 2019-08-14 RX ORDER — SODIUM CHLORIDE 9 MG/ML
100 INJECTION, SOLUTION INTRAVENOUS CONTINUOUS
Status: DISCONTINUED | OUTPATIENT
Start: 2019-08-14 | End: 2019-08-17 | Stop reason: HOSPADM

## 2019-08-14 RX ORDER — TACROLIMUS 1 MG/1
2 CAPSULE ORAL EVERY EVENING
Status: DISCONTINUED | OUTPATIENT
Start: 2019-08-14 | End: 2019-08-17 | Stop reason: HOSPADM

## 2019-08-14 RX ORDER — INSULIN GLARGINE 100 [IU]/ML
5 INJECTION, SOLUTION SUBCUTANEOUS
Status: DISCONTINUED | OUTPATIENT
Start: 2019-08-14 | End: 2019-08-14

## 2019-08-14 RX ORDER — INSULIN GLARGINE 100 [IU]/ML
10 INJECTION, SOLUTION SUBCUTANEOUS
Status: DISCONTINUED | OUTPATIENT
Start: 2019-08-14 | End: 2019-08-17 | Stop reason: HOSPADM

## 2019-08-14 RX ORDER — METHYLPREDNISOLONE SODIUM SUCCINATE 40 MG/ML
40 INJECTION, POWDER, LYOPHILIZED, FOR SOLUTION INTRAMUSCULAR; INTRAVENOUS EVERY 12 HOURS SCHEDULED
Status: DISCONTINUED | OUTPATIENT
Start: 2019-08-14 | End: 2019-08-17 | Stop reason: HOSPADM

## 2019-08-14 RX ORDER — ASPIRIN 81 MG/1
81 TABLET, CHEWABLE ORAL DAILY
Status: DISCONTINUED | OUTPATIENT
Start: 2019-08-15 | End: 2019-08-17 | Stop reason: HOSPADM

## 2019-08-14 RX ORDER — DOXAZOSIN 2 MG/1
2 TABLET ORAL
Status: DISCONTINUED | OUTPATIENT
Start: 2019-08-14 | End: 2019-08-17 | Stop reason: HOSPADM

## 2019-08-14 RX ORDER — AMLODIPINE BESYLATE 10 MG/1
10 TABLET ORAL EVERY MORNING
Status: DISCONTINUED | OUTPATIENT
Start: 2019-08-15 | End: 2019-08-17 | Stop reason: HOSPADM

## 2019-08-14 RX ORDER — ROPINIROLE 0.25 MG/1
0.25 TABLET, FILM COATED ORAL 2 TIMES DAILY
Status: DISCONTINUED | OUTPATIENT
Start: 2019-08-14 | End: 2019-08-17 | Stop reason: HOSPADM

## 2019-08-14 RX ADMIN — DOXAZOSIN 2 MG: 2 TABLET ORAL at 22:50

## 2019-08-14 RX ADMIN — CLONIDINE HYDROCHLORIDE 0.3 MG: 0.1 TABLET ORAL at 22:50

## 2019-08-14 RX ADMIN — PRAVASTATIN SODIUM 80 MG: 80 TABLET ORAL at 20:42

## 2019-08-14 RX ADMIN — CEFTRIAXONE SODIUM 1000 MG: 10 INJECTION, POWDER, FOR SOLUTION INTRAVENOUS at 20:34

## 2019-08-14 RX ADMIN — INSULIN LISPRO 1 UNITS: 100 INJECTION, SOLUTION INTRAVENOUS; SUBCUTANEOUS at 22:50

## 2019-08-14 RX ADMIN — SODIUM BICARBONATE 650 MG TABLET 1300 MG: at 20:48

## 2019-08-14 RX ADMIN — METHYLPREDNISOLONE SODIUM SUCCINATE 40 MG: 40 INJECTION, POWDER, FOR SOLUTION INTRAMUSCULAR; INTRAVENOUS at 20:43

## 2019-08-14 RX ADMIN — METOPROLOL TARTRATE 50 MG: 50 TABLET, FILM COATED ORAL at 20:44

## 2019-08-14 RX ADMIN — AMLODIPINE BESYLATE 5 MG: 5 TABLET ORAL at 20:42

## 2019-08-14 RX ADMIN — DIPHENHYDRAMINE HCL 25 MG: 25 TABLET, COATED ORAL at 20:58

## 2019-08-14 RX ADMIN — INSULIN GLARGINE 10 UNITS: 100 INJECTION, SOLUTION SUBCUTANEOUS at 22:50

## 2019-08-14 RX ADMIN — ROPINIROLE HYDROCHLORIDE 0.25 MG: 0.25 TABLET, FILM COATED ORAL at 20:35

## 2019-08-14 RX ADMIN — TACROLIMUS 2 MG: 1 CAPSULE ORAL at 21:01

## 2019-08-14 RX ADMIN — BUSPIRONE HYDROCHLORIDE 5 MG: 5 TABLET ORAL at 20:42

## 2019-08-14 RX ADMIN — ARIPIPRAZOLE 30 MG: 15 TABLET ORAL at 22:50

## 2019-08-14 RX ADMIN — DULOXETINE HYDROCHLORIDE 60 MG: 60 CAPSULE, DELAYED RELEASE ORAL at 20:42

## 2019-08-14 RX ADMIN — SODIUM CHLORIDE 100 ML/HR: 0.9 INJECTION, SOLUTION INTRAVENOUS at 20:29

## 2019-08-14 RX ADMIN — HEPARIN SODIUM 5000 UNITS: 5000 INJECTION INTRAVENOUS; SUBCUTANEOUS at 22:50

## 2019-08-14 RX ADMIN — HYDRALAZINE HYDROCHLORIDE 50 MG: 25 TABLET ORAL at 22:50

## 2019-08-14 NOTE — ASSESSMENT & PLAN NOTE
Patient is status post kidney and pancreatic transplant in 1998  Continue Prograf and steroids  Nephrology consulted for further management, appreciate their recommendations

## 2019-08-14 NOTE — ED PROVIDER NOTES
History  Chief Complaint   Patient presents with    Abnormal Lab     Patient presents to the E R  with report of abnormal kidney function, having a transplanted kidney having 4 month old  multiple myeloma diagnosis  Patient has a generalized rash that started yesterday and states:  "It began after taking a new chemo pill "     Patient is a 58-year-old female with a past medical history of renal and pancreatic transplantation back in 1998, multiple myeloma diagnosis 4 months ago, recently started on chemotherapy (revlimib) 6 days ago who presents with rash in her groin area for the past day, as well as hematuria and bacteriuria seen on urinalysis  She describes that the rash appears to be spreading, is non painful and non itchy, and now she notices slight rash on her face arms and lower legs  She describes that she did not notice that her urine was dark, but on urinalysis ordered from Nephrology, revealed innumerable white blood cells, innumerable red blood cells, and innumerable bacteria  She has not described any dysuria  No fevers chills  No chest pain, shortness of breath, no abdominal pain, nausea vomiting, diarrhea or constipation  ROS otherwise negative  Prior to Admission Medications   Prescriptions Last Dose Informant Patient Reported? Taking?    ARIPiprazole (ABILIFY) 30 mg tablet 8/14/2019 at Unknown time Self No Yes   Sig: Take 1 tablet (30 mg total) by mouth daily at bedtime for 180 days   Cholecalciferol (VITAMIN D3) 1000 units CAPS 8/14/2019 at Unknown time Self Yes Yes   Sig: Take 3 capsules by mouth daily     DULoxetine (CYMBALTA) 60 mg delayed release capsule 8/14/2019 at Unknown time Self No Yes   Sig: Take 1 capsule (60 mg total) by mouth 2 (two) times a day for 180 days   Insulin Glargine (TOUJEO SOLOSTAR) 300 units/mL CONCETRATED U-300 injection pen 8/14/2019 at Unknown time Self No Yes   Sig: Inject 1 Units under the skin daily at bedtime   Insulin Pen Needle (BD PEN NEEDLE VISHNU U/F) 32G X 4 MM MISC 8/14/2019 at Unknown time Self No Yes   Sig: Use 4 times daily   Ixazomib Citrate (NINLARO) 4 MG CAPS Not Taking at Unknown time Self No No   Sig: Take 4 mg once weekly for 3 weeks on and 1 week off   Patient not taking: Reported on 8/14/2019   LORazepam (ATIVAN) 2 mg tablet 8/14/2019 at Unknown time Self No Yes   Sig: Take 1 tablet (2 mg total) by mouth 3 (three) times a day as needed for anxiety for up to 120 days To be filled on or after 6/29/19   Lancets (ONETOUCH ULTRASOFT) lancets 8/14/2019 at Unknown time Self Yes Yes   Sig: by Does not apply route 4 (four) times a day     ONE TOUCH ULTRA TEST test strip 8/14/2019 at Unknown time Self No Yes   Sig: Use 4 times daily ( please dispense One touch Ultra test strips)   amLODIPine (NORVASC) 10 mg tablet 8/14/2019 at Unknown time Self No Yes   Sig: TAKE 1 TABLET(10MG) BY MOUTH EVERY MORNING AND 1/2 TABLET(5MG) EVERY EVENING   aspirin 81 MG tablet 8/14/2019 at Unknown time Self Yes Yes   Sig: Take 1 tablet by mouth daily   busPIRone (BUSPAR) 5 mg tablet 8/14/2019 at Unknown time Self No Yes   Sig: Take 1 tablet (5 mg total) by mouth 2 (two) times a day for 180 days   clindamycin (CLEOCIN) 300 MG capsule More than a month at Unknown time Self Yes No   Sig: Take 300 mg by mouth 4 (four) times a day   cloNIDine (CATAPRES) 0 1 mg tablet 8/14/2019 at Unknown time Self No Yes   Sig: TAKE 3 TABLETS BY MOUTH EVERY MORNING, 3 TABLETS AT NOON, AND 3 TABLETS EVERY EVENING   doxazosin (CARDURA) 1 mg tablet 8/13/2019 at Unknown time Self No Yes   Sig: Take 2 tablets (2 mg total) by mouth daily at bedtime   folic acid (FOLVITE) 1 mg tablet 8/14/2019 at Unknown time Self No Yes   Sig: TAKE 1 TABLET BY MOUTH DAILY AS DIRECTED   furosemide (LASIX) 20 mg tablet 8/14/2019 at Unknown time  No Yes   Sig: TAKE 1 TABLET BY MOUTH EVERY DAY   hydrALAZINE (APRESOLINE) 50 mg tablet 8/14/2019 at Unknown time  No Yes   Sig: TAKE 1 TABLET(50MG) BY MOUTH THREE TIMES DAILY FOR 90 DAYS   insulin lispro (HUMALOG KWIKPEN) 100 units/mL injection pen 8/14/2019 at Unknown time Self No Yes   Sig: INJECT 10 UNDER THE SKIN EVERY DAY OR AS DIRECTED   Patient taking differently: INJECT 10 UNDER THE SKIN EVERY DAY OR AS DIRECTED   lenalidomide (REVLIMID) 5 MG CAPS 8/13/2019 at Unknown time  No Yes   Sig: Take 1 capsule (5 mg total) by mouth daily for 21 days [facility]   # [id number] 7/30/19   levothyroxine 125 mcg tablet 8/14/2019 at Unknown time Self No Yes   Sig: Take 1 tablet (125 mcg total) by mouth daily   methylPREDNISolone 4 MG tablet therapy pack 8/14/2019 at Unknown time Self No Yes   Sig: Use as directed on package   metoprolol tartrate (LOPRESSOR) 50 mg tablet 8/14/2019 at Unknown time Self No Yes   Sig: TAKE 1 TABLET(50MG TOTAL) BY MOUTH TWICE DAILY   pravastatin (PRAVACHOL) 80 mg tablet 8/14/2019 at Unknown time Self No Yes   Sig: TAKE 1 TABLET BY MOUTH DAILY   predniSONE 5 mg tablet 8/14/2019 at Unknown time Self No Yes   Sig: Take 1 tablet (5 mg total) by mouth daily   rOPINIRole (REQUIP) 0 25 mg tablet 8/14/2019 at Unknown time Self No Yes   Sig: TAKE 1 TABLET BY MOUTH TWICE DAILY   sertraline (ZOLOFT) 100 mg tablet 8/14/2019 at Unknown time Self No Yes   Sig: Take 2 tablets (200 mg total) by mouth daily for 180 days   sevelamer carbonate (RENVELA) 800 mg tablet 8/14/2019 at Unknown time  No Yes   Sig: Take 1 tablet (800 mg total) by mouth 3 (three) times a day with meals   sodium bicarbonate 650 mg tablet 8/14/2019 at Unknown time Self No Yes   Sig: TAKE 2 TABLETS BY MOUTH THREE TIMES DAILY   tacrolimus (PROGRAF) 1 mg capsule 8/14/2019 at Unknown time Self No Yes   Sig: Take 3 mg in AM and 2 mg in PM (6/18/19)      Facility-Administered Medications: None       Past Medical History:   Diagnosis Date    Abnormal liver function test     [facility] kidney [facility] )     Acute on chronic congestive heart failure (HCC)     Allergic urticaria     Anemia     Cancer (HCC)     Multiple myeloma    Cervical dysplasia     Cholelithiasis     Chronic diastolic (congestive) heart failure ([facility] ) 9/18/2017    Diabetes mellitus [facility]     Previous, controlled with diet    Diabetes mellitus with foot ulcer ([facility] )     Disease of thyroid gland     Encephalopathy     Hematuria     + leak est- secondary to UTIs/panc drainage    History of transfusion     Hyperkalemia     Hypertension     Iliotibial band syndrome     Lumbar radiculopathy     Multiple myeloma (HCC)     Multiple myeloma ([facility] )     Night blindness     Nonrheumatic aortic (valve) insufficiency     Pneumonia     Renal disorder     Retinopathy     Seborrhea     Seizure ([facility] )     Shingles     Sinus tachycardia     B blocker - cardio echo stress test 02 normal/neg LE doppler 2/02 OK and 12/07    Status post simultaneous kidney and pancreas transplant ([facility] )     Toe amputation status ([facility])     Trochanteric bursitis        Past Surgical History:   Procedure Laterality Date    CATARACT EXTRACTION      CHOLECYSTECTOMY      COLONOSCOPY      two polyps in the rectum removed and biopsied diverticulosis in the sigmoid colon, external hemorrhoiods- Dr Barfield    COMBINED KIDNEY-PANCREAS TRANSPLANT N/A     CT BONE MARROW BIOPSY AND ASPIRATION  4/17/2019    CYSTOSCOPY N/A 10/13/2016    Procedure: CYSTOSCOPY, retrograde pyelogram, biopsy of ureteral polyp; Surgeon: William Lora MD;  Location: [facility];  Service:    Eris Evangelista DILATION AND CURETTAGE OF UTERUS      ESOPHAGOGASTRODUODENOSCOPY N/A 11/20/2017    Procedure: ESOPHAGOGASTRODUODENOSCOPY (EGD); Surgeon: Khadijah Roth DO;  Location: [facility];   Service: Gastroenterology    EYE SURGERY      cataracts    FOOT AMPUTATION THROUGH METATARSAL Left     FOOT SURGERY Right     excision of metatarsal heads    HALLUX VALGUS CORRECTION Right     NEPHRECTOMY TRANSPLANTED ORGAN      PANCREATIC TRANSPLANT REMOVAL  1998       Family History   Problem Relation Age of Onset    Hypertension Mother    Eris Evangelista Cancer Mother     Hypertension Father     Cancer Father     Cancer Maternal Grandfather     Cancer Paternal Grandmother     Cancer Paternal Grandfather     Depression Sister     Breast cancer Maternal Grandmother 77     I have reviewed and agree with the history as documented  Social History     Tobacco Use    Smoking status: Former Smoker     Last attempt to quit: 2012     Years since quittin 2    Smokeless tobacco: Never Used   Substance Use Topics    Alcohol use: Never     Frequency: Never     Binge frequency: Never     Comment: (history)    Drug use: Never     Types: Hydrocodone     Comment: Past cocaine use many years ago - no current use        Review of Systems   Constitutional: Negative for chills and fever  HENT: Negative for congestion, rhinorrhea and sore throat  Respiratory: Negative for cough and shortness of breath  Cardiovascular: Negative for chest pain and palpitations  Gastrointestinal: Negative for abdominal pain, constipation, diarrhea, nausea and vomiting  Genitourinary: Positive for hematuria  Negative for difficulty urinating and flank pain  Musculoskeletal: Negative for arthralgias  Skin: Positive for rash  Neurological: Negative for dizziness, weakness, light-headedness and headaches  Psychiatric/Behavioral: Negative for agitation, behavioral problems and confusion  All other systems reviewed and are negative        Physical Exam  ED Triage Vitals [19 1434]   Temperature Pulse Respirations Blood Pressure SpO2   98 8 °F (37 1 °C) 72 18 137/57 97 %      Temp Source Heart Rate Source Patient Position - Orthostatic VS BP Location FiO2 (%)   Tympanic Monitor Sitting Left arm --      Pain Score       No Pain             Orthostatic Vital Signs  Vitals:    19 1608 19 1842 19 1946 19 1948   BP: (!) 178/74 (!) 192/78 (!) 176/58 (!) 176/58   Pulse: 65 61 63 62   Patient Position - Orthostatic VS: Lying Sitting         Physical Exam   Constitutional: She is oriented to person, place, and time  She appears well-developed and well-nourished  HENT:   Head: Normocephalic and atraumatic  Eyes: EOM are normal    Neck: Normal range of motion  Neck supple  Cardiovascular: Normal rate, regular rhythm and normal heart sounds  Exam reveals no friction rub  No murmur heard  Pulmonary/Chest: Effort normal and breath sounds normal  No respiratory distress  She has no wheezes  She has no rales  Abdominal: Soft  Bowel sounds are normal  She exhibits no distension  There is no tenderness  Musculoskeletal: Normal range of motion  She exhibits no edema  Neurological: She is alert and oriented to person, place, and time  Coordination normal    Skin: Skin is warm  Rash (Maculopapular rash that is worse in the bilateral groin  Occasional papules on cheeks, arms, and lower legs ) noted  Psychiatric: She has a normal mood and affect  Her behavior is normal  Judgment and thought content normal    Nursing note and vitals reviewed        ED Medications  Medications   amLODIPine (NORVASC) tablet 5 mg (5 mg Oral Given 8/14/19 2042)   ARIPiprazole (ABILIFY) tablet 30 mg (has no administration in time range)   aspirin chewable tablet 81 mg (has no administration in time range)   busPIRone (BUSPAR) tablet 5 mg (5 mg Oral Given 8/14/19 2042)   cholecalciferol (VITAMIN D3) tablet 1,000 Units (has no administration in time range)   cloNIDine (CATAPRES) tablet 0 3 mg (has no administration in time range)   doxazosin (CARDURA) tablet 2 mg (has no administration in time range)   DULoxetine (CYMBALTA) delayed release capsule 60 mg (60 mg Oral Given 6/16/08 8406)   folic acid (FOLVITE) tablet 1,000 mcg (has no administration in time range)   hydrALAZINE (APRESOLINE) tablet 50 mg (has no administration in time range)   levothyroxine tablet 125 mcg (has no administration in time range)   LORazepam (ATIVAN) tablet 2 mg (has no administration in time range) metoprolol tartrate (LOPRESSOR) tablet 50 mg (50 mg Oral Given 8/14/19 2044)   pravastatin (PRAVACHOL) tablet 80 mg (80 mg Oral Given 8/14/19 2042)   rOPINIRole (REQUIP) tablet 0 25 mg (0 25 mg Oral Given 8/14/19 2035)   sertraline (ZOLOFT) tablet 200 mg (has no administration in time range)   tacrolimus (PROGRAF) capsule 3 mg (has no administration in time range)   tacrolimus (PROGRAF) capsule 2 mg (2 mg Oral Given 8/14/19 2101)   sevelamer (RENAGEL) tablet 800 mg (has no administration in time range)   sodium bicarbonate tablet 1,300 mg (1,300 mg Oral Given 8/14/19 2048)   ondansetron (ZOFRAN) injection 4 mg (has no administration in time range)   heparin (porcine) subcutaneous injection 5,000 Units (has no administration in time range)   acetaminophen (TYLENOL) tablet 650 mg (has no administration in time range)   sodium chloride 0 9 % infusion (100 mL/hr Intravenous New Bag 8/14/19 2029)   methylPREDNISolone sodium succinate (Solu-MEDROL) injection 40 mg (40 mg Intravenous Given 8/14/19 2043)   diphenhydrAMINE (BENADRYL) tablet 25 mg (25 mg Oral Given 8/14/19 2058)   ceftriaxone (ROCEPHIN) 1 g/50 mL in dextrose IVPB (1,000 mg Intravenous New Bag 8/14/19 2034)   insulin lispro (HumaLOG) 100 units/mL subcutaneous injection 1-5 Units (has no administration in time range)   insulin lispro (HumaLOG) 100 units/mL subcutaneous injection 1-5 Units (has no administration in time range)   insulin glargine (LANTUS) subcutaneous injection 10 Units 0 1 mL (has no administration in time range)   amLODIPine (NORVASC) tablet 10 mg (has no administration in time range)       Diagnostic Studies  Results Reviewed     Procedure Component Value Units Date/Time    Tacrolimus level [[id number]]  (Normal) Collected:  08/14/19 1600    Lab Status:  Final result Specimen:  Blood from Arm, Right Updated:  08/14/19 1929     TACROLIMUS 4 2 ng/mL     Urine Microscopic [[id number]]  (Abnormal) Collected:  08/14/19 1549    Lab Status:  Final result Specimen:  Urine, Clean Catch Updated:  08/14/19 1709     RBC, UA 10-20 /hpf      WBC, UA 30-50 /hpf      Epithelial Cells Occasional /hpf      Bacteria, UA Innumerable /hpf     BK virus, DNA, urine, quantitative [[id number]] Collected:  08/14/19 1608    Lab Status:   In process Specimen:  Urine, Clean Catch Updated:  08/14/19 1644    CBC and differential [[id number]]  (Abnormal) Collected:  08/14/19 1600    Lab Status:  Final result Specimen:  Blood from Hand, Right Updated:  08/14/19 1625     WBC 7 62 Thousand/uL      RBC 3 09 Million/uL      Hemoglobin 9 5 g/dL      Hematocrit 30 3 %      MCV 98 fL      MCH 30 7 pg      MCHC 31 4 g/dL      RDW 14 6 %      MPV 13 0 fL      Platelets 859 Thousands/uL      nRBC 0 /100 WBCs      Neutrophils Relative 74 %      Immat GRANS % 1 %      Lymphocytes Relative 15 %      Monocytes Relative 5 %      Eosinophils Relative 5 %      Basophils Relative 0 %      Neutrophils Absolute 5 70 Thousands/µL      Immature Grans Absolute 0 06 Thousand/uL      Lymphocytes Absolute 1 11 Thousands/µL      Monocytes Absolute 0 36 Thousand/µL      Eosinophils Absolute 0 37 Thousand/µL      Basophils Absolute 0 02 Thousands/µL     Basic metabolic panel [[id number]]  (Abnormal) Collected:  08/14/19 1600    Lab Status:  Final result Specimen:  Blood from Hand, Right Updated:  08/14/19 1619     Sodium 140 mmol/L      Potassium 3 5 mmol/L      Chloride 107 mmol/L      CO2 22 mmol/L      ANION GAP 11 mmol/L      BUN 55 mg/dL      Creatinine 2 46 mg/dL      Glucose 112 mg/dL      Calcium 9 0 mg/dL      eGFR 21 ml/min/1 73sq m     Narrative:       Nathaniel guidelines for Chronic Kidney Disease (CKD):     Stage 1 with normal or high GFR (GFR > 90 mL/min/1 73 square meters)    Stage 2 Mild CKD (GFR = 60-89 mL/min/1 73 square meters)    Stage 3A Moderate CKD (GFR = 45-59 mL/min/1 73 square meters)    Stage 3B Moderate CKD (GFR = 30-44 mL/min/1 73 square meters)    Stage 4 Severe CKD (GFR = 15-29 mL/min/1 73 square meters)    Stage 5 End Stage CKD (GFR <15 mL/min/1 73 square meters)  Note: GFR calculation is accurate only with a steady state creatinine    UA w Reflex to Microscopic [[id number]]  (Abnormal) Collected:  08/14/19 1549    Lab Status:  Final result Specimen:  Urine, Clean Catch Updated:  08/14/19 1611     Color, UA Yellow     Clarity, UA Cloudy     Specific [place], UA 1 010     pH, UA 8 0     Leukocytes, UA Large     Nitrite, UA Positive     Protein,  (2+) mg/dl      Glucose, UA Negative mg/dl      Ketones, UA Negative mg/dl      Urobilinogen, UA 0 2 E U /dl      Bilirubin, UA Negative     Blood, UA Large    Urine culture [[id number]] Collected:  08/14/19 1549    Lab Status: In process Specimen:  Urine, Clean Catch Updated:  08/14/19 1555                  kidney and bladder    (Results Pending)         Procedures  Procedures        ED Course                               MDM  Number of Diagnoses or Management Options  Acute kidney injury [facility]):   Hematuria:   Diagnosis management comments: Patient's creatinine was elevated from her baseline of around 1 6-1 7, to 2 4 here  Her urinalysis revealed an elevated red blood cell, white blood cell, bacteria count  Patient does not report any urine symptoms  Ultrasound of the kidney and bladder ordered at Nephrology suggestion  Patient was admitted to [place] where she will receive hematology/oncology evaluation of her rash  Patient was started on fluids in the ED        Disposition  Final diagnoses:   Acute kidney injury ([address] )   Hematuria     Time reflects when diagnosis was documented in both MDM as applicable and the Disposition within this note     Time User Action Codes Description Comment    8/14/2019  7:00 PM Jailyn Mccracken Add [N17 9] Acute kidney injury ([address] )     8/14/2019  7:00 PM Jailyn Mccracken Add [R31 9] Hematuria       ED Disposition     ED Disposition Condition Date/Time Comment    Admit Stable Wed Aug 14, 2019  7:00 PM Case was discussed with AJAY and the patient's admission status was agreed to be Admission Status: inpatient status to the service of Dr Cori Murray   Follow-up Information    None         Current Discharge Medication List      CONTINUE these medications which have NOT CHANGED    Details   amLODIPine (NORVASC) 10 mg tablet TAKE 1 TABLET(10MG) BY MOUTH EVERY MORNING AND 1/2 TABLET(5MG) EVERY EVENING  Qty: 135 tablet, Refills: 0    Comments: **Patient requests 90 days supply**  Associated Diagnoses: Benign essential HTN      ARIPiprazole (ABILIFY) 30 mg tablet Take 1 tablet (30 mg total) by mouth daily at bedtime for 180 days  Qty: 90 tablet, Refills: 1    Associated Diagnoses: Major depressive disorder, recurrent episode, in full remission ([facility])      aspirin 81 MG tablet Take 1 tablet by mouth daily      busPIRone (BUSPAR) 5 mg tablet Take 1 tablet (5 mg total) by mouth 2 (two) times a day for 180 days  Qty: 180 tablet, Refills: 1    Associated Diagnoses: FELIPE (generalized anxiety disorder)      Cholecalciferol (VITAMIN D3) 1000 units CAPS Take 3 capsules by mouth daily        cloNIDine (CATAPRES) 0 1 mg tablet TAKE 3 TABLETS BY MOUTH EVERY MORNING, 3 TABLETS AT NOON, AND 3 TABLETS EVERY EVENING  Qty: 810 tablet, Refills: 4    Comments: **Patient requests 90 days supply**  Associated Diagnoses: Benign essential HTN      doxazosin (CARDURA) 1 mg tablet Take 2 tablets (2 mg total) by mouth daily at bedtime  Qty: 90 tablet, Refills: 0    Associated Diagnoses: CKD (chronic kidney disease), stage IV ([facility])      DULoxetine (CYMBALTA) 60 mg delayed release capsule Take 1 capsule (60 mg total) by mouth 2 (two) times a day for 180 days  Qty: 180 capsule, Refills: 1    Associated Diagnoses: FELIPE (generalized anxiety disorder);  Major depressive disorder, recurrent episode, in full remission ([address] )      folic acid (FOLVITE) 1 mg tablet TAKE 1 TABLET BY MOUTH DAILY AS DIRECTED  Qty: 90 tablet, Refills: 3 Associated Diagnoses: Mixed hyperlipidemia      furosemide (LASIX) 20 mg tablet TAKE 1 TABLET BY MOUTH EVERY DAY  Qty: 90 tablet, Refills: 0    Associated Diagnoses: CKD (chronic kidney disease), stage IV ([facility])      hydrALAZINE (APRESOLINE) 50 mg tablet TAKE 1 TABLET(50MG) BY MOUTH THREE TIMES DAILY FOR 90 DAYS  Qty: 270 tablet, Refills: 0    Associated Diagnoses: Essential hypertension      Insulin Glargine (TOUJEO SOLOSTAR) 300 units/mL CONCETRATED U-300 injection pen Inject 1 Units under the skin daily at bedtime  Qty: 4 pen, Refills: 0    Associated Diagnoses: Uncontrolled type 2 diabetes mellitus with hyperglycemia ([facility])      insulin lispro (HUMALOG KWIKPEN) 100 units/mL injection pen INJECT 10 UNDER THE SKIN EVERY DAY OR AS DIRECTED  Qty: 45 mL, Refills: 1    Comments: **Patient requests 90 days supply**  Associated Diagnoses: Type 1 diabetes mellitus with hyperglycemia ([facility])      Insulin Pen Needle (BD PEN NEEDLE VISHNU U/F) 32G X 4 MM MISC Use 4 times daily  Qty: 400 each, Refills: 1    Associated Diagnoses: Type 2 diabetes mellitus with hyperglycemia, with long-term current use of insulin ([facility])      Lancets (ONETOUCH ULTRASOFT) lancets by Does not apply route 4 (four) times a day        lenalidomide (REVLIMID) 5 MG CAPS Take 1 capsule (5 mg total) by mouth daily for 21 days Auth  # [id number] 7/30/19  Qty: 21 capsule, Refills: 0    Associated Diagnoses: Multiple myeloma not having achieved remission ([facility])      levothyroxine 125 mcg tablet Take 1 tablet (125 mcg total) by mouth daily  Qty: 90 tablet, Refills: 2    Comments: **Patient requests 90 days supply**  Associated Diagnoses: Type 1 diabetes mellitus with diabetic polyneuropathy ([facility] [address] );  Acquired hypothyroidism      LORazepam (ATIVAN) 2 mg tablet Take 1 tablet (2 mg total) by mouth 3 (three) times a day as needed for anxiety for up to 120 days To be filled on or after 6/29/19  Qty: 90 tablet, Refills: 3    Associated Diagnoses: FELIPE (generalized anxiety disorder)      methylPREDNISolone 4 MG tablet therapy pack Use as directed on package  Qty: 1 each, Refills: 0    Associated Diagnoses: Rash; Multiple myeloma not having achieved remission ([facility])      metoprolol tartrate (LOPRESSOR) 50 mg tablet TAKE 1 TABLET(50MG TOTAL) BY MOUTH TWICE DAILY  Qty: 180 tablet, Refills: 5    Comments: **Patient requests 90 days supply**  Associated Diagnoses: Benign essential HTN      ONE TOUCH ULTRA TEST test strip Use 4 times daily ( please dispense One touch Ultra test strips)  Qty: 400 each, Refills: 1    Comments: May break down into 30 day increment at patient's request   Associated Diagnoses: Type 1 diabetes mellitus with complication ([facility])      pravastatin (PRAVACHOL) 80 mg tablet TAKE 1 TABLET BY MOUTH DAILY  Qty: 90 tablet, Refills: 5    Comments: **Patient requests 90 days supply**  Associated Diagnoses: Mixed hyperlipidemia      predniSONE 5 mg tablet Take 1 tablet (5 mg total) by mouth daily  Qty: 90 tablet, Refills: 3    Associated Diagnoses: Benign essential HTN      rOPINIRole (REQUIP) 0 25 mg tablet TAKE 1 TABLET BY MOUTH TWICE DAILY  Qty: 180 tablet, Refills: 1    Associated Diagnoses: RLS (restless legs syndrome)      sertraline (ZOLOFT) 100 mg tablet Take 2 tablets (200 mg total) by mouth daily for 180 days  Qty: 180 tablet, Refills: 1    Associated Diagnoses: FELIPE (generalized anxiety disorder);  Major depressive disorder, recurrent episode, in full remission ([facility])      sevelamer carbonate (RENVELA) 800 mg tablet Take 1 tablet (800 mg total) by mouth 3 (three) times a day with meals  Qty: 270 tablet, Refills: 3    Associated Diagnoses: Hyperphosphatemia      sodium bicarbonate 650 mg tablet TAKE 2 TABLETS BY MOUTH THREE TIMES DAILY  Qty: 180 tablet, Refills: 0    Associated Diagnoses: Acidemia      tacrolimus (PROGRAF) 1 mg capsule Take 3 mg in AM and 2 mg in PM (6/18/19)  Qty: 180 capsule, Refills: 6    Associated Diagnoses: Renal transplant recipient clindamycin (CLEOCIN) 300 MG capsule Take 300 mg by mouth 4 (four) times a day      Ixazomib Citrate (NINLARO) 4 MG CAPS Take 4 mg once weekly for 3 weeks on and 1 week off  Qty: 3 capsule, Refills: 6    Associated Diagnoses: Multiple myeloma not having achieved remission ([facility] )           No discharge procedures on file  ED Provider  Attending physically available and evaluated Mauricio Luna I managed the patient along with the ED Attending      Electronically Signed by         Wallace Harris MD  08/14/19 5762

## 2019-08-14 NOTE — TELEPHONE ENCOUNTER
Patient called she developed a rash on both of her thighs going around to her buttocks,it is not itchy   She just started Revlimid please call patient

## 2019-08-14 NOTE — TELEPHONE ENCOUNTER
Reviewed labwork with patient    - Cr worsening, hematuria, pyuria     - new rash since starting new chemotherapy yesterday  -I advised the patient to go to the ER for further evaluation given history of urinary tract colonization in the current urine which shows new hematuria which is significant, and also rash that is worsening, and acute kidney injury    -further workup per the ER team   -would start with renal and bladder ultrasound of the transplanted kidney  -will likely need to check BK PCR, UA, urine culture, BMP, CBC, tacrolimus level, and further workup per primary team for rash  -given the patient's rash in multiple drug allergies, patient be better served in the hospital while potentially treating for possible cystitis  -may need further evaluation further renal dysfunction if the above tests are unrevealing  -I have discussed with the ER team  -sent a message to the hematologist  -sent a message to the inpatient nephrology team

## 2019-08-14 NOTE — H&P
H&P- Harshal Levy 1965, 53 y o  female MRN: [id number]    Unit/Bed#: KAMAR Encounter: [id number]    Primary Care Provider: Kaushal Cintron MD   Date and time admitted to hospital: 8/14/2019  2:40 PM        * Acute renal failure superimposed on stage 3 chronic kidney disease ([facility] )  Assessment & Plan  Worsening creatinine with Hematuria and pyuria  Lasix dose was increased 1 week ago  Started on  new chemotherapy Lenalidomide  6 days ago  Decreased oral intake  Hold Lasix  Start IV fluid for hydration  Hold lenalidomide for now  Consult Nephrology for further management  Follow on renal ultrasound and Prograf level    Skin rash  Assessment & Plan  Pleuritic Maculopapular rash mostly on bilateral thigh and groin area  Started yesterday  Rule out secondary to drug rash  Hold lenalidomide  Change prednisone to IV Solu-Medrol  Continue supportive care    Abnormal urinalysis  Assessment & Plan  Patient reported urinary frequency over the past couple days associated with decreasing oral intake  No dysuria  No fever or chills  Rule out UTI  Start IV antibiotic empirically  Follow on urine cultures    Multiple myeloma not having achieved remission ([facility])  Assessment & Plan  Hold lenalidomide for now  Monitor calcium level  Outpatient Hematology follow-up    Major depressive disorder, recurrent episode, in full remission ([facility] )  Assessment & Plan  Continue home meds    Essential hypertension  Assessment & Plan  Monitor blood pressure  Continue Lopressor, hydralazine, Cardura, clonidine and amlodipine    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 [facility])  Assessment & Plan  Lab Results   Component Value Date    HGBA1C 5 1 05/29/2019       No results for input(s): POCGLU in the last 72 hours  Blood Sugar Average: Last 72 hrs:  start insulin sliding scale  Start Lantus q h s    Monitor blood sugar while on steroids    Renal transplant recipient  Assessment & Plan  Patient is status post kidney and pancreatic transplant in 1998  Continue Prograf and steroids  Nephrology consulted for further management        CHIEF COMPLAINT   Skin rash    History of Present Illness   HPI:  Mora Basilio is a 53 y o  female was sent to the emergency room by Nephrology due to worsening creatinine,  hematuria and pyuria   Patient with progressive multiple myeloma was started on lenalidomide 1 week ago by her hematologist after she developed skin rash from Ixazomib which was discontinued    Yesterday patient noticed pruritic skin rash started on bilateral thighs and groin area, uncomfortable itching,  different than the skin rash she had it from previous chemotherapy  She was also complaining of urinary frequency without dysuria or abdominal pain  No fever or chills  She reported increasing oral intake for the past week    Patient chronically on Lasix, Lasix dose was increased by Nephrology recently    Patient with underlying kidney and pancreatic transplant in 1998 with subsequent pancreatic insufficiency, diabetes mellitus type 1, hypertension, depression, chronic kidney disease stage 3 and progressive multiple myeloma      Patient's father at bedside      Review of Systems   Constitutional: Positive for appetite change and fatigue  Negative for chills and fever  HENT: Negative for sore throat  Respiratory: Negative for cough, chest tightness and shortness of breath  Cardiovascular: Negative for chest pain, palpitations and leg swelling  Gastrointestinal: Negative for abdominal pain, blood in stool, diarrhea, nausea and vomiting  Endocrine: Positive for polyuria  Genitourinary: Negative for difficulty urinating and dysuria  Skin: Positive for rash  Neurological: Positive for weakness  Negative for dizziness, speech difficulty and headaches  All other systems reviewed and are negative          Historical Information   Past Medical History:   Diagnosis Date    Abnormal liver function test     Acute kidney injury ([facility] )     Acute on chronic congestive heart failure (HCC)     Allergic urticaria     Anemia     Cancer (HCC)     Multiple myeloma    Cervical dysplasia     Cholelithiasis     Chronic diastolic (congestive) heart failure ([facility] ) 9/18/2017    Diabetes mellitus ([facility] )     Previous, controlled with diet    Diabetes mellitus with foot ulcer ([facility] )     Disease of thyroid gland     Encephalopathy     Hematuria     + leak est- secondary to UTIs/panc drainage    History of transfusion     Hyperkalemia     Hypertension     Iliotibial band syndrome     Lumbar radiculopathy     Multiple myeloma (HCC)     Multiple myeloma ([facility]     Night blindness     Nonrheumatic aortic (valve) insufficiency     Pneumonia     Renal disorder     Retinopathy     Seborrhea     Seizure ([facility] )     Shingles     Sinus tachycardia     B blocker - cardio echo stress test 02 normal/neg LE doppler 2/02 OK and 12/07    Status post simultaneous kidney and pancreas transplant ([facility] )     Toe amputation status (HCC)     Trochanteric bursitis      Past Surgical History:   Procedure Laterality Date    CATARACT EXTRACTION      CHOLECYSTECTOMY      COLONOSCOPY      two polyps in the rectum removed and biopsied diverticulosis in the sigmoid colon, external hemorrhoiods- Dr Barfield    COMBINED KIDNEY-PANCREAS TRANSPLANT N/A     CT BONE MARROW BIOPSY AND ASPIRATION  4/17/2019    CYSTOSCOPY N/A 10/13/2016    Procedure: CYSTOSCOPY, retrograde pyelogram, biopsy of ureteral polyp; Surgeon: Rustam Yuan MD;  Location: [facility];  Service:    Ruben Zapata DILATION AND CURETTAGE OF UTERUS      ESOPHAGOGASTRODUODENOSCOPY N/A 11/20/2017    Procedure: ESOPHAGOGASTRODUODENOSCOPY (EGD); Surgeon: Martinez Fontaine DO;  Location: [facility];   Service: Gastroenterology    EYE SURGERY      cataracts    FOOT AMPUTATION THROUGH METATARSAL Left     FOOT SURGERY Right     excision of metatarsal heads    HALLUX VALGUS CORRECTION Right     NEPHRECTOMY TRANSPLANTED ORGAN      PANCREATIC TRANSPLANT REMOVAL       Social History   Social History     Substance and Sexual Activity   Alcohol Use No    Frequency: Never    Binge frequency: Never    Comment: (history)     Social History     Substance and Sexual Activity   Drug Use Never    Types: Hydrocodone    Comment: Past cocaine use many years ago - no current use     Social History     Tobacco Use   Smoking Status Former Smoker    Last attempt to quit: 2012    Years since quittin 2   Smokeless Tobacco Never Used     Family History:   Family History   Problem Relation Age of Onset    Hypertension Mother     Cancer Mother     Hypertension Father     Cancer Father     Cancer Maternal Grandfather     Cancer Paternal Grandmother     Cancer Paternal Grandfather     Depression Sister     Breast cancer Maternal Grandmother 77       Meds/Allergies     (Not in a hospital admission)  Allergies   Allergen Reactions    Cefadroxil     Morphine Other (See Comments)     Other reaction(s): Other (See Comments)  projectile vomiting    Morphine And Related GI Intolerance    Myrbetriq [Mirabegron] Hives    Penicillins Hives     Other reaction(s): Other (See Comments)  Respiratory Distress,hives  Tolerates cefazolin       Vitals: Blood pressure (!) 192/78, pulse 61, temperature 98 8 °F (37 1 °C), temperature source Tympanic, resp  rate 18, weight 103 kg (227 lb 1 2 oz), SpO2 96 %  Physical Exam   Constitutional: She is oriented to person, place, and time  She appears well-developed  No distress  HENT:   Head: Normocephalic and atraumatic  Mouth/Throat: Oropharynx is clear and moist  No oropharyngeal exudate  Eyes: Conjunctivae and EOM are normal  No scleral icterus  Neck: Normal range of motion  Neck supple  Cardiovascular: Normal rate, regular rhythm, normal heart sounds and intact distal pulses  No murmur heard    Pulmonary/Chest: Effort normal and breath sounds normal  No respiratory distress  She has no wheezes  Abdominal: Soft  Bowel sounds are normal  She exhibits no distension  There is no tenderness  There is no rebound  Musculoskeletal: Normal range of motion  She exhibits no edema or tenderness  Neurological: She is alert and oriented to person, place, and time  No cranial nerve deficit  Skin: Skin is warm and dry  Rash (Bilateral thigh maculopapular rash,  no vesicles) noted           Lab Results:   Admission on 08/14/2019   Component Date Value    WBC 08/14/2019 7 62     RBC 08/14/2019 3 09*    Hemoglobin 08/14/2019 9 5*    Hematocrit 08/14/2019 30 3*    MCV 08/14/2019 98     MCH 08/14/2019 30 7     MCHC 08/14/2019 31 4     RDW 08/14/2019 14 6     MPV 08/14/2019 13 0*    Platelets 12/65/1587 211     nRBC 08/14/2019 0     Neutrophils Relative 08/14/2019 74     Immat GRANS % 08/14/2019 1     Lymphocytes Relative 08/14/2019 15     Monocytes Relative 08/14/2019 5     Eosinophils Relative 08/14/2019 5     Basophils Relative 08/14/2019 0     Neutrophils Absolute 08/14/2019 5 70     Immature Grans Absolute 08/14/2019 0 06     Lymphocytes Absolute 08/14/2019 1 11     Monocytes Absolute 08/14/2019 0 36     Eosinophils Absolute 08/14/2019 0 37     Basophils Absolute 08/14/2019 0 02     Sodium 08/14/2019 140     Potassium 08/14/2019 3 5     Chloride 08/14/2019 107     CO2 08/14/2019 22     ANION GAP 08/14/2019 11     BUN 08/14/2019 55*    Creatinine 08/14/2019 2 46*    Glucose 08/14/2019 112     Calcium 08/14/2019 9 0     eGFR 08/14/2019 21     Color, UA 08/14/2019 Yellow     Clarity, UA 08/14/2019 Cloudy     Specific Gravity, UA 08/14/2019 1 010     pH, UA 08/14/2019 8 0     Leukocytes, UA 08/14/2019 Large*    Nitrite, UA 08/14/2019 Positive*    Protein, UA 08/14/2019 100 (2+)*    Glucose, UA 08/14/2019 Negative     Ketones, UA 08/14/2019 Negative     Urobilinogen, UA 08/14/2019 0 2     Bilirubin, UA 08/14/2019 Negative     Blood, UA 08/14/2019 Large*    RBC, UA 08/14/2019 10-20*    WBC, UA 08/14/2019 30-50*    Epithelial Cells 08/14/2019 Occasional     Bacteria, UA 08/14/2019 Innumerable*   Lab on 08/14/2019   Component Date Value    Sodium 08/14/2019 137     Potassium 08/14/2019 3 6     Chloride 08/14/2019 105     CO2 08/14/2019 22     ANION GAP 08/14/2019 10     BUN 08/14/2019 51*    Creatinine 08/14/2019 2 63*    Glucose, Fasting 08/14/2019 122*    Calcium 08/14/2019 8 8     AST 08/14/2019 9     ALT 08/14/2019 18     Alkaline Phosphatase 08/14/2019 94     Total Protein 08/14/2019 9 1*    Albumin 08/14/2019 3 0*    Total Bilirubin 08/14/2019 0 36     eGFR 08/14/2019 20     Magnesium 08/14/2019 2 1     Phosphorus 08/14/2019 4 1     WBC 08/14/2019 7 62     RBC 08/14/2019 3 07*    Hemoglobin 08/14/2019 9 4*    Hematocrit 08/14/2019 30 1*    MCV 08/14/2019 98     MCH 08/14/2019 30 6     MCHC 08/14/2019 31 2*    RDW 08/14/2019 14 8     Platelets 36/25/8392 206     MPV 08/14/2019 13 6*    Cholesterol 08/14/2019 97     Triglycerides 08/14/2019 90     HDL, Direct 08/14/2019 38*    LDL Calculated 08/14/2019 41     Non-HDL-Chol (CHOL-HDL) 08/14/2019 59      Imaging: I have personally reviewed pertinent reports  EKG, Pathology, and Other Studies: I have personally reviewed pertinent diagnostic work       Assessment/Plan   Patient Active Problem List   Diagnosis    Renal transplant recipient    Chronic kidney disease, stage 4 (severe) ([address] )    Acquired hypothyroidism    Benign hypertension with CKD (chronic kidney disease) stage IV (HCC)    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 ([address] )    Essential hypertension    Anemia    Status post kidney transplant    Immunosuppression ([address] )    Chronic diastolic congestive heart failure (HCC)    Urinary incontinence    Calculus, bladder, diverticulum    Stage 3 chronic kidney disease (HCC)    Hypercalcemia    MGUS (monoclonal gammopathy of unknown significance)    Chronic constipation    Acid reflux disease    Atrial fibrillation (HCC)    Kaiser esophagus    Cataract    [facility]    Gastric and duodenal disease    Diabetic nephropathy ([facility])    Diabetic polyneuropathy ([facility])    Retinopathy, diabetic, bilateral ([facility])    Diastolic dysfunction    Essential and other specified forms of tremor    Failure of pancreas transplant    FELIPE (generalized anxiety disorder)    Hyperlipidemia    Irritable bowel syndrome    Major depressive disorder, recurrent episode, in full remission ([facility] )    Aortic regurgitation    OAB (overactive bladder)    Osteoporosis    Peripheral vascular disease ([facility])    Post-traumatic stress disorder, chronic    Recurrent UTI    Restless legs syndrome    Secondary hyperparathyroidism, renal ([facility])    Anxiety    Neck strain    Neuropathy in diabetes ([facility] )    Periorbital cellulitis of left eye    History of herpes zoster    History of recurrent UTIs    Subclavian artery stenosis, left ([facility])    Abnormal ECG    Impetigo    Multiple myeloma not having achieved remission ([facility])    Rash    Acute renal failure superimposed on stage 3 chronic kidney disease ([facility])    Skin rash    Abnormal urinalysis         Code Status:  Full code  VTE Pharmacologic Prophylaxis: Heparin  VTE Mechanical Prophylaxis: sequential compression device

## 2019-08-14 NOTE — ED ATTENDING ATTESTATION
Joan Ardon DO, saw and evaluated the patient  I have discussed the patient with the resident/non-physician practitioner and agree with the resident's/non-physician practitioner's findings, Plan of Care, and MDM as documented in the resident's/non-physician practitioner's note, except where noted  All available labs and Radiology studies were reviewed  I was present for key portions of any procedure(s) performed by the resident/non-physician practitioner and I was immediately available to provide assistance  At this point I agree with the current assessment done in the Emergency Department  I have conducted an independent evaluation of this patient a history and physical is as follows:    Patient is a 50-year-old female status post allograft renal transplant, developed multiple myeloma, treated with a 2nd new chemotherapy med starting 1 week ago  Three days ago she noticed an itchy diffuse rash in her bilateral groin area and little bit on her right flank  No new soaps, clothing or detergents  No trouble breathing or sloughing of the skin  Today she had outpatient laboratory studies showing worsening renal insufficiency, creatinine 2 63, baseline appears to be about 2 2, urinalysis showed large leukocytes, large blood, positive nitrites, innumerable RBCs and WBCs and bacteria  Astorga Spinner She was called by her nephrologist and advised to go to the ED  Requested workup would be urine culture urinalysis BMP, CBC, tacrolimus level, renal ultrasound and bladder ultrasound  The patient herself denies any fevers, denies any chills, denies abdominal pain, denies dysuria or hematuria      General:  Patient is well-appearing  Head:  Atraumatic  Eyes:  Conjunctiva pink  ENT:  Mucous members are moist  Neck:  Supple  Cardiac:  S1-S2, without murmurs  Lungs:  Clear to auscultation bilaterally  Abdomen:  Soft, nontender, normal bowel sounds, no CVA tenderness, no tympany, no rigidity, no guarding  Extremities:  Normal range of motion  Neurologic:  Awake, fluent speech, normal comprehension  AAOx3  Skin:  Pink warm and dry, on her bilateral thighs and groin areas are a irregular multiple erythematous plaques, no petechia or purpura, no sloughing of the skin, ranging in size from 3-5 mm to several cm  There is no excoriation of the skin, no sloughing of skin, negative Nikolsky  Psychiatric:  Alert, pleasant, cooperative    Prior to workup being completed the patient was seen in the emerged from by Dr Luke Nunez from Nephrology  He indicated that the patient was appearing well clinically, that if all of her workup was unremarkable, that we should discuss it with him or 1 of his partners in the patient may be able to be discharged home  He did also request a Heme-Onc consultation regarding the rash is a may be related to her new chemotherapy medication      Labs Reviewed   CBC AND DIFFERENTIAL - Abnormal       Result Value Ref Range Status    WBC 7 62  4 31 - 10 16 Thousand/uL Final    RBC 3 09 (*) 3 81 - 5 12 Million/uL Final    Hemoglobin 9 5 (*) 11 5 - 15 4 g/dL Final    Hematocrit 30 3 (*) 34 8 - 46 1 % Final    MCV 98  82 - 98 fL Final    MCH 30 7  26 8 - 34 3 pg Final    MCHC 31 4  31 4 - 37 4 g/dL Final    RDW 14 6  11 6 - 15 1 % Final    MPV 13 0 (*) 8 9 - 12 7 fL Final    Platelets 526  140 - 390 Thousands/uL Final    nRBC 0  /100 WBCs Final    Neutrophils Relative 74  43 - 75 % Final    Immat GRANS % 1  0 - 2 % Final    Lymphocytes Relative 15  14 - 44 % Final    Monocytes Relative 5  4 - 12 % Final    Eosinophils Relative 5  0 - 6 % Final    Basophils Relative 0  0 - 1 % Final    Neutrophils Absolute 5 70  1 85 - 7 62 Thousands/µL Final    Immature Grans Absolute 0 06  0 00 - 0 20 Thousand/uL Final    Lymphocytes Absolute 1 11  0 60 - 4 47 Thousands/µL Final    Monocytes Absolute 0 36  0 17 - 1 22 Thousand/µL Final    Eosinophils Absolute 0 37  0 00 - 0 61 Thousand/µL Final    Basophils Absolute 0 02  0 00 - 0 10 Thousands/µL Final   BASIC METABOLIC PANEL - Abnormal    Sodium 140  136 - 145 mmol/L Final    Potassium 3 5  3 5 - 5 3 mmol/L Final    Chloride 107  100 - 108 mmol/L Final    CO2 22  21 - 32 mmol/L Final    ANION GAP 11  4 - 13 mmol/L Final    BUN 55 (*) 5 - 25 mg/dL Final    Creatinine 2 46 (*) 0 60 - 1 30 mg/dL Final    Comment: Standardized to IDMS reference method    Glucose 112  65 - 140 mg/dL Final    Comment:   If the patient is fasting, the ADA then defines impaired fasting glucose as > 100 mg/dL and diabetes as > or equal to 123 mg/dL  Specimen collection should occur prior to Sulfasalazine administration due to the potential for falsely depressed results  Specimen collection should occur prior to Sulfapyridine administration due to the potential for falsely elevated results      Calcium 9 0  8 3 - 10 1 mg/dL Final    eGFR 21  ml/min/1 73sq m Final    Narrative:     National Kidney Disease Foundation guidelines for Chronic Kidney Disease (CKD):     Stage 1 with normal or high GFR (GFR > 90 mL/min/1 73 square meters)    Stage 2 Mild CKD (GFR = 60-89 mL/min/1 73 square meters)    Stage 3A Moderate CKD (GFR = 45-59 mL/min/1 73 square meters)    Stage 3B Moderate CKD (GFR = 30-44 mL/min/1 73 square meters)    Stage 4 Severe CKD (GFR = 15-29 mL/min/1 73 square meters)    Stage 5 End Stage CKD (GFR <15 mL/min/1 73 square meters)  Note: GFR calculation is accurate only with a steady state creatinine   URINALYSIS WITH REFLEX TO MICROSCOPIC - Abnormal    Color, UA Yellow   Final    Clarity, UA Cloudy   Final    Specific Holt, UA 1 010  1 003 - 1 030 Final    pH, UA 8 0  4 5, 5 0, 5 5, 6 0, 6 5, 7 0, 7 5, 8 0 Final    Leukocytes, UA Large (*) Negative Final    Nitrite, UA Positive (*) Negative Final    Protein,  (2+) (*) Negative mg/dl Final    Glucose, UA Negative  Negative mg/dl Final    Ketones, UA Negative  Negative mg/dl Final    Urobilinogen, UA 0 2  0 2, 1 0 E U /dl E U /dl Final    Bilirubin, UA Negative  Negative Final    Blood, UA Large (*) Negative Final   URINE MICROSCOPIC - Abnormal    RBC, UA 10-20 (*) None Seen, 0-5 /hpf Final    WBC, UA 30-50 (*) None Seen, 0-5, 5-55, 5-65 /hpf Final    Epithelial Cells Occasional  None Seen, Occasional /hpf Final    Bacteria, UA Innumerable (*) None Seen, Occasional /hpf Final   URINE CULTURE   BK VIRUS,  QUANTITATIVE PCR,URINE       US kidney and bladder    (Results Pending)         Case discussed with hematology/oncology, patient will be admitted for further care and workup    Critical Care Time  Procedures

## 2019-08-14 NOTE — ASSESSMENT & PLAN NOTE
Pleuritic Maculopapular rash mostly on bilateral thigh and groin area  Started yesterday  Rule out secondary to drug rash  Hold lenalidomide  Change prednisone to IV Solu-Medrol  Continue supportive care

## 2019-08-14 NOTE — ED NOTES
Patient transported to [facility], [address] [place] [address]  08/14/19 9610

## 2019-08-14 NOTE — ASSESSMENT & PLAN NOTE
Patient is status post kidney and pancreatic transplant in 1998  Continue Prograf to and steroids  Nephrology consulted for further management

## 2019-08-14 NOTE — ASSESSMENT & PLAN NOTE
Lab Results   Component Value Date    HGBA1C 5 1 05/29/2019       No results for input(s): POCGLU in the last 72 hours  Blood Sugar Average: Last 72 hrs:  start insulin sliding scale  Start Lantus q h s    Monitor blood sugar while on steroids

## 2019-08-14 NOTE — TELEPHONE ENCOUNTER
Returned tc spoke with pt  She states, Dr Tan Elizabeth wants her to go to ER because of rash, increased creatinine levels and she is reporting massive amount of blood in her urine, plus she is allergic to so many medications  She is leaving to go to the [facility]

## 2019-08-14 NOTE — ASSESSMENT & PLAN NOTE
Worsening creatinine with Hematuria and pyuria  Lasix dose was increased 1 week ago  Started on  new chemotherapy Lenalidomide  6 days ago  Decreased oral intake  Hold Lasix  Start IV fluid for hydration  Hold lenalidomide for now  Consult Nephrology for further management  Follow on renal ultrasound

## 2019-08-15 LAB
ALBUMIN SERPL BCP-MCNC: 3 G/DL (ref 3.5–5)
ALP SERPL-CCNC: 85 U/L (ref 46–116)
ALT SERPL W P-5'-P-CCNC: 17 U/L (ref 12–78)
ANION GAP SERPL CALCULATED.3IONS-SCNC: 8 MMOL/L (ref 4–13)
AST SERPL W P-5'-P-CCNC: 10 U/L (ref 5–45)
BASOPHILS # BLD MANUAL: 0 THOUSAND/UL (ref 0–0.1)
BASOPHILS NFR MAR MANUAL: 0 % (ref 0–1)
BILIRUB SERPL-MCNC: 0.33 MG/DL (ref 0.2–1)
BUN SERPL-MCNC: 52 MG/DL (ref 5–25)
CALCIUM SERPL-MCNC: 8.3 MG/DL (ref 8.3–10.1)
CHLORIDE SERPL-SCNC: 111 MMOL/L (ref 100–108)
CO2 SERPL-SCNC: 22 MMOL/L (ref 21–32)
CREAT SERPL-MCNC: 2.36 MG/DL (ref 0.6–1.3)
EOSINOPHIL # BLD MANUAL: 0 THOUSAND/UL (ref 0–0.4)
EOSINOPHIL NFR BLD MANUAL: 0 % (ref 0–6)
ERYTHROCYTE [DISTWIDTH] IN BLOOD BY AUTOMATED COUNT: 14.6 % (ref 11.6–15.1)
GFR SERPL CREATININE-BSD FRML MDRD: 23 ML/MIN/1.73SQ M
GIANT PLATELETS BLD QL SMEAR: PRESENT
GLUCOSE SERPL-MCNC: 117 MG/DL (ref 65–140)
GLUCOSE SERPL-MCNC: 130 MG/DL (ref 65–140)
GLUCOSE SERPL-MCNC: 148 MG/DL (ref 65–140)
GLUCOSE SERPL-MCNC: 164 MG/DL (ref 65–140)
GLUCOSE SERPL-MCNC: 98 MG/DL (ref 65–140)
HCT VFR BLD AUTO: 29.2 % (ref 34.8–46.1)
HGB BLD-MCNC: 9 G/DL (ref 11.5–15.4)
LYMPHOCYTES # BLD AUTO: 0.34 THOUSAND/UL (ref 0.6–4.47)
LYMPHOCYTES # BLD AUTO: 4 % (ref 14–44)
MAGNESIUM SERPL-MCNC: 2 MG/DL (ref 1.6–2.6)
MCH RBC QN AUTO: 30.6 PG (ref 26.8–34.3)
MCHC RBC AUTO-ENTMCNC: 30.8 G/DL (ref 31.4–37.4)
MCV RBC AUTO: 99 FL (ref 82–98)
METAMYELOCYTES NFR BLD MANUAL: 1 % (ref 0–1)
MONOCYTES # BLD AUTO: 0 THOUSAND/UL (ref 0–1.22)
MONOCYTES NFR BLD: 0 % (ref 4–12)
NEUTROPHILS # BLD MANUAL: 7.81 THOUSAND/UL (ref 1.85–7.62)
NEUTS SEG NFR BLD AUTO: 93 % (ref 43–75)
NRBC BLD AUTO-RTO: 0 /100 WBCS
PHOSPHATE SERPL-MCNC: 3.8 MG/DL (ref 2.7–4.5)
PLATELET # BLD AUTO: 191 THOUSANDS/UL (ref 149–390)
PLATELET BLD QL SMEAR: ADEQUATE
PMV BLD AUTO: 12.9 FL (ref 8.9–12.7)
POLYCHROMASIA BLD QL SMEAR: PRESENT
POTASSIUM SERPL-SCNC: 4.1 MMOL/L (ref 3.5–5.3)
PROT SERPL-MCNC: 8.6 G/DL (ref 6.4–8.2)
RBC # BLD AUTO: 2.94 MILLION/UL (ref 3.81–5.12)
RBC MORPH BLD: PRESENT
SODIUM SERPL-SCNC: 141 MMOL/L (ref 136–145)
VARIANT LYMPHS # BLD AUTO: 2 %
WBC # BLD AUTO: 8.4 THOUSAND/UL (ref 4.31–10.16)

## 2019-08-15 PROCEDURE — 99232 SBSQ HOSP IP/OBS MODERATE 35: CPT | Performed by: INTERNAL MEDICINE

## 2019-08-15 PROCEDURE — 85027 COMPLETE CBC AUTOMATED: CPT | Performed by: INTERNAL MEDICINE

## 2019-08-15 PROCEDURE — 82948 REAGENT STRIP/BLOOD GLUCOSE: CPT

## 2019-08-15 PROCEDURE — 84100 ASSAY OF PHOSPHORUS: CPT | Performed by: INTERNAL MEDICINE

## 2019-08-15 PROCEDURE — 80053 COMPREHEN METABOLIC PANEL: CPT | Performed by: INTERNAL MEDICINE

## 2019-08-15 PROCEDURE — 85007 BL SMEAR W/DIFF WBC COUNT: CPT | Performed by: INTERNAL MEDICINE

## 2019-08-15 PROCEDURE — 99223 1ST HOSP IP/OBS HIGH 75: CPT | Performed by: INTERNAL MEDICINE

## 2019-08-15 PROCEDURE — 83735 ASSAY OF MAGNESIUM: CPT | Performed by: INTERNAL MEDICINE

## 2019-08-15 RX ORDER — HYDRALAZINE HYDROCHLORIDE 20 MG/ML
10 INJECTION INTRAMUSCULAR; INTRAVENOUS EVERY 6 HOURS PRN
Status: DISCONTINUED | OUTPATIENT
Start: 2019-08-15 | End: 2019-08-17 | Stop reason: HOSPADM

## 2019-08-15 RX ADMIN — DULOXETINE HYDROCHLORIDE 60 MG: 60 CAPSULE, DELAYED RELEASE ORAL at 08:44

## 2019-08-15 RX ADMIN — VITAMIN D, TAB 1000IU (100/BT) 1000 UNITS: 25 TAB at 08:45

## 2019-08-15 RX ADMIN — HYDRALAZINE HYDROCHLORIDE 50 MG: 25 TABLET ORAL at 13:02

## 2019-08-15 RX ADMIN — SERTRALINE HYDROCHLORIDE 200 MG: 50 TABLET ORAL at 08:44

## 2019-08-15 RX ADMIN — SEVELAMER HYDROCHLORIDE 800 MG: 800 TABLET, FILM COATED PARENTERAL at 13:02

## 2019-08-15 RX ADMIN — DOXAZOSIN 2 MG: 2 TABLET ORAL at 22:34

## 2019-08-15 RX ADMIN — SODIUM BICARBONATE 650 MG TABLET 1300 MG: at 08:44

## 2019-08-15 RX ADMIN — SEVELAMER HYDROCHLORIDE 800 MG: 800 TABLET, FILM COATED PARENTERAL at 17:28

## 2019-08-15 RX ADMIN — SODIUM CHLORIDE 100 ML/HR: 0.9 INJECTION, SOLUTION INTRAVENOUS at 06:00

## 2019-08-15 RX ADMIN — HEPARIN SODIUM 5000 UNITS: 5000 INJECTION INTRAVENOUS; SUBCUTANEOUS at 05:55

## 2019-08-15 RX ADMIN — DULOXETINE HYDROCHLORIDE 60 MG: 60 CAPSULE, DELAYED RELEASE ORAL at 17:28

## 2019-08-15 RX ADMIN — CLONIDINE HYDROCHLORIDE 0.3 MG: 0.1 TABLET ORAL at 22:29

## 2019-08-15 RX ADMIN — SODIUM CHLORIDE 100 ML/HR: 0.9 INJECTION, SOLUTION INTRAVENOUS at 16:07

## 2019-08-15 RX ADMIN — DIPHENHYDRAMINE HCL 25 MG: 25 TABLET, COATED ORAL at 05:12

## 2019-08-15 RX ADMIN — AMLODIPINE BESYLATE 10 MG: 10 TABLET ORAL at 08:47

## 2019-08-15 RX ADMIN — TACROLIMUS 2 MG: 1 CAPSULE ORAL at 17:28

## 2019-08-15 RX ADMIN — ASPIRIN 81 MG 81 MG: 81 TABLET ORAL at 08:45

## 2019-08-15 RX ADMIN — FOLIC ACID 1000 MCG: 1 TABLET ORAL at 08:44

## 2019-08-15 RX ADMIN — BUSPIRONE HYDROCHLORIDE 5 MG: 5 TABLET ORAL at 08:44

## 2019-08-15 RX ADMIN — HYDRALAZINE HYDROCHLORIDE 50 MG: 25 TABLET ORAL at 05:55

## 2019-08-15 RX ADMIN — SEVELAMER HYDROCHLORIDE 800 MG: 800 TABLET, FILM COATED PARENTERAL at 08:44

## 2019-08-15 RX ADMIN — PRAVASTATIN SODIUM 80 MG: 80 TABLET ORAL at 17:28

## 2019-08-15 RX ADMIN — LEVOTHYROXINE SODIUM 125 MCG: 125 TABLET ORAL at 05:56

## 2019-08-15 RX ADMIN — METOPROLOL TARTRATE 50 MG: 50 TABLET, FILM COATED ORAL at 22:29

## 2019-08-15 RX ADMIN — SODIUM BICARBONATE 650 MG TABLET 1300 MG: at 22:30

## 2019-08-15 RX ADMIN — HYDRALAZINE HYDROCHLORIDE 50 MG: 25 TABLET ORAL at 22:29

## 2019-08-15 RX ADMIN — ROPINIROLE HYDROCHLORIDE 0.25 MG: 0.25 TABLET, FILM COATED ORAL at 17:28

## 2019-08-15 RX ADMIN — HEPARIN SODIUM 5000 UNITS: 5000 INJECTION INTRAVENOUS; SUBCUTANEOUS at 22:35

## 2019-08-15 RX ADMIN — DIPHENHYDRAMINE HCL 25 MG: 25 TABLET, COATED ORAL at 16:12

## 2019-08-15 RX ADMIN — HEPARIN SODIUM 5000 UNITS: 5000 INJECTION INTRAVENOUS; SUBCUTANEOUS at 13:02

## 2019-08-15 RX ADMIN — CEFTRIAXONE SODIUM 1000 MG: 10 INJECTION, POWDER, FOR SOLUTION INTRAVENOUS at 20:27

## 2019-08-15 RX ADMIN — ARIPIPRAZOLE 30 MG: 15 TABLET ORAL at 22:34

## 2019-08-15 RX ADMIN — METHYLPREDNISOLONE SODIUM SUCCINATE 40 MG: 40 INJECTION, POWDER, FOR SOLUTION INTRAMUSCULAR; INTRAVENOUS at 22:35

## 2019-08-15 RX ADMIN — CLONIDINE HYDROCHLORIDE 0.3 MG: 0.1 TABLET ORAL at 05:55

## 2019-08-15 RX ADMIN — BUSPIRONE HYDROCHLORIDE 5 MG: 5 TABLET ORAL at 17:28

## 2019-08-15 RX ADMIN — ROPINIROLE HYDROCHLORIDE 0.25 MG: 0.25 TABLET, FILM COATED ORAL at 08:48

## 2019-08-15 RX ADMIN — METHYLPREDNISOLONE SODIUM SUCCINATE 40 MG: 40 INJECTION, POWDER, FOR SOLUTION INTRAMUSCULAR; INTRAVENOUS at 08:44

## 2019-08-15 RX ADMIN — TACROLIMUS 3 MG: 1 CAPSULE ORAL at 08:45

## 2019-08-15 RX ADMIN — SODIUM BICARBONATE 650 MG TABLET 1300 MG: at 17:28

## 2019-08-15 RX ADMIN — INSULIN LISPRO 1 UNITS: 100 INJECTION, SOLUTION INTRAVENOUS; SUBCUTANEOUS at 17:28

## 2019-08-15 RX ADMIN — INSULIN GLARGINE 10 UNITS: 100 INJECTION, SOLUTION SUBCUTANEOUS at 22:35

## 2019-08-15 RX ADMIN — DIPHENHYDRAMINE HCL 25 MG: 25 TABLET, COATED ORAL at 23:28

## 2019-08-15 RX ADMIN — METOPROLOL TARTRATE 50 MG: 50 TABLET, FILM COATED ORAL at 08:47

## 2019-08-15 RX ADMIN — CLONIDINE HYDROCHLORIDE 0.3 MG: 0.1 TABLET ORAL at 13:02

## 2019-08-15 NOTE — UTILIZATION REVIEW
Initial Clinical Review    Admission: Date/Time/Statement: 8/14/19 @ 1901 Inpatient Written     Orders Placed This Encounter   Procedures    Inpatient Admission     Standing Status:   Standing     Number of Occurrences:   1     Order Specific Question:   Admitting Physician     Answer:   Radha Cortez [[id number]]     Order Specific Question:   Level of Care     Answer:   Med Surg [[id number]]     Order Specific Question:   Estimated length of stay     Answer:   More than 2 Midnights     Order Specific Question:   Certification     Answer:   I certify that inpatient services are medically necessary for this patient for a duration of greater than two midnights  See H&P and MD Progress Notes for additional information about the patient's course of treatment  ED Arrival Information     Expected Arrival Acuity Means of Arrival Escorted By Service Admission Type    - 8/14/2019 14:25 Emergent Walk-In Self Hospitalist Emergency    Arrival Complaint    acute EDUARDO        Chief Complaint   Patient presents with    Abnormal Lab     Patient presents to the E R  with report of abnormal kidney function, having a transplanted kidney having 4 month old  multiple myeloma diagnosis  Patient has a generalized rash that started yesterday and states:  "It began after taking a new chemo pill "     Assessment/Plan:  53 y o  female was sent to the emergency room by Nephrology due to worsening creatinine,  hematuria and pyuria   Patient with progressive multiple myeloma was started on lenalidomide 1 week ago by her hematologist after she developed skin rash from Ixazomib which was discontinued  Yesterday patient noticed pruritic skin rash started on bilateral thighs and groin area, uncomfortable itching,  different than the skin rash she had it from previous chemotherapy  She was also complaining of urinary frequency without dysuria or abdominal pain    She reported increasing oral intake for the past week  Acute renal failure superimposed on stage 3 chronic kidney disease (HCC)  Assessment & Plan  Worsening creatinine with Hematuria and pyuria  Lasix dose was increased 1 week ago  Started on  new chemotherapy Lenalidomide  6 days ago  Decreased oral intake  Hold Lasix  Start IV fluid for hydration  Hold lenalidomide for now  Consult Nephrology for further management  Follow on renal ultrasound and Prograf level     Skin rash  Assessment & Plan  Pleuritic Maculopapular rash mostly on bilateral thigh and groin area  Started yesterday  Rule out secondary to drug rash  Hold lenalidomide  Change prednisone to IV Solu-Medrol  Continue supportive care     Abnormal urinalysis  Assessment & Plan  Patient reported urinary frequency over the past couple days associated with decreasing oral intake  No dysuria  No fever or chills  Rule out UTI  Start IV antibiotic empirically  Follow on urine cultures     Multiple myeloma not having achieved remission (HCC)  Assessment & Plan  Hold lenalidomide for now  Monitor calcium level  Outpatient Hematology follow-up     Major depressive disorder, recurrent episode, in full remission ([facility] [address] )  Assessment & Plan  Continue home meds     Essential hypertension  Assessment & Plan  Monitor blood pressure  Continue Lopressor, hydralazine, Cardura, clonidine and amlodipine     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 [facility])  Assessment & Plan        Lab Results   Component Value Date     HGBA1C 5 1 05/29/2019         No results for input(s): POCGLU in the last 72 hours      Blood Sugar Average: Last 72 hrs:  start insulin sliding scale  Start Lantus q h s    Monitor blood sugar while on steroids     Renal transplant recipient  Assessment & Plan  Patient is status post kidney and pancreatic transplant in 1998  Continue Prograf and steroids  Nephrology consulted for further management    ED Triage Vitals [08/14/19 1434]   Temperature Pulse Respirations Blood Pressure SpO2   98 8 °F (37 1 °C) 72 18 137/57 97 % Temp Source Heart Rate Source Patient Position - Orthostatic VS BP Location FiO2 (%)   Tympanic Monitor Sitting Left arm --      Pain Score       No Pain        Wt Readings from Last 1 Encounters:   08/14/19 102 kg (224 lb 13 9 oz)     Additional Vital Signs:   Date/Time  Temp  Pulse  Resp  BP  SpO2  O2 Device  Patient Position - Orthostatic VS   08/15/19 1448    79      98 %       08/15/19 1447    81    169/63  97 %       08/15/19 1446    81      97 %       08/15/19 1438    80      95 %       08/15/19 1437  98 4 °F (36 9 °C)  73    169/63  95 %       08/15/19 0722    72      95 %       08/15/19 0721  98 1 °F (36 7 °C)  75    169/63  97 %       08/15/19 0720  98 1 °F (36 7 °C)  75  18  169/63  96 %       08/15/19 0555        171/63Abnormal          08/14/19 2142  98 1 °F (36 7 °C)  63  18  172/58Abnormal   96 %       08/14/19 1948  98 1 °F (36 7 °C)  62  18  176/58Abnormal   98 %       08/14/19 1946  98 1 °F (36 7 °C)  63  18  176/58Abnormal   98 %       08/14/19 1842    61  18  192/78Abnormal   96 %  None (Room air)  Sitting   08/14/19 1608    65  18  178/74Abnormal   97 %  None (Room air)  Lying   08/14/19 1434  98 8 °F (37 1 °C)  72  18  137/57  97 %  None (Room air)  Sitting     Pertinent Labs/Diagnostic Test Results:   Results from last 7 days   Lab Units 08/15/19  0530 08/14/19  1600 08/14/19  0649   WBC Thousand/uL 8 40 7 62 7 62   HEMOGLOBIN g/dL 9 0* 9 5* 9 4*   HEMATOCRIT % 29 2* 30 3* 30 1*   PLATELETS Thousands/uL 191 211 206   NEUTROS ABS Thousands/µL  --  5 70  --      Results from last 7 days   Lab Units 08/15/19  0530 08/14/19  1600 08/14/19  0649   SODIUM mmol/L 141 140 137   POTASSIUM mmol/L 4 1 3 5 3 6   CHLORIDE mmol/L 111* 107 105   CO2 mmol/L 22 22 22   ANION GAP mmol/L 8 11 10   BUN mg/dL 52* 55* 51*   CREATININE mg/dL 2 36* 2 46* 2 63*   EGFR ml/min/1 73sq m 23 21 20   CALCIUM mg/dL 8 3 9 0 8 8   MAGNESIUM mg/dL 2 0  --  2 1   PHOSPHORUS mg/dL 3 8  --  4 1     Results from last 7 days   Lab Units 08/15/19  0530 08/14/19  0649   AST U/L 10 9   ALT U/L 17 18   ALK PHOS U/L 85 94   TOTAL PROTEIN g/dL 8 6* 9 1*   ALBUMIN g/dL 3 0* 3 0*   TOTAL BILIRUBIN mg/dL 0 33 0 36     Results from last 7 days   Lab Units 08/15/19  1127 08/15/19  0741 08/14/19  2256 08/14/19  2118   POC GLUCOSE mg/dl 117 130 153* 123     Results from last 7 days   Lab Units 08/15/19  0530 08/14/19  1600   GLUCOSE RANDOM mg/dL 148* 112     Results from last 7 days   Lab Units 08/14/19  1549 08/14/19  0649   CLARITY UA  Cloudy Cloudy   COLOR UA  Yellow Yellow   SPEC GRAV UA  1 010 1 011   PH UA  8 0 8 0   GLUCOSE UA mg/dl Negative Negative   KETONES UA mg/dl Negative Negative   BLOOD UA  Large* Large*   PROTEIN UA mg/dl 100 (2+)* 100 (2+)*   NITRITE UA  Positive* Positive*   BILIRUBIN UA  Negative Negative   UROBILINOGEN UA E U /dl 0 2 0 2   LEUKOCYTES UA  Large* Large*   WBC UA /hpf 30-50* Innumerable*   RBC UA /hpf 10-20* Innumerable*   BACTERIA UA /hpf Innumerable* Innumerable*   EPITHELIAL CELLS WET PREP /hpf Occasional None Seen   CREATININE UR mg/dL  --  42 6   PROTEIN UR mg/dL  --  112   PROT/CREAT RATIO UR   --  2 63*     Results from last 7 days   Lab Units 08/14/19  1549   URINE CULTURE  >100,000 cfu/ml Gram Negative Jerald Enteric Like*     US KIDNEY AND BLADDER:  Atrophic native kidneys  Elevated arterial resistive indices in the transplant kidney up to 0 8 (previously up to 0 7) concerning for potential development of transplant rejection or ATN  Nephrology consult recommended  No hydronephrosis or renal vein thrombosis identified  No perinephric fluid collections    ED Treatment:   Medication Administration from 08/14/2019 1334 to 08/14/2019 1925     None        Past Medical History:   Diagnosis Date    Abnormal liver function test     Acute kidney injury ([facility] [address] )     Acute on chronic congestive heart failure (HCC)     Allergic urticaria     Anemia     Cancer ([facility] [address] ) Multiple myeloma    Cervical dysplasia     Cholelithiasis     Chronic diastolic (congestive) heart failure (HCC) 9/18/2017    Diabetes mellitus (HCC)     Previous, controlled with diet    Diabetes mellitus with foot ulcer ([facility] [address] )     Disease of thyroid gland     Encephalopathy     Hematuria     + leak est- secondary to UTIs/panc drainage    History of transfusion     Hyperkalemia     Hypertension     Iliotibial band syndrome     Lumbar radiculopathy     Multiple myeloma (HCC)     Multiple myeloma (HCC)     Night blindness     Nonrheumatic aortic (valve) insufficiency     Pneumonia     Renal disorder     Retinopathy     Seborrhea     Seizure ([facility] [address] )     Shingles     Sinus tachycardia     B blocker - cardio echo stress test 02 normal/neg LE doppler 2/02 OK and 12/07    Status post simultaneous kidney and pancreas transplant ([place] [address] )     Toe amputation status ([facility])     Trochanteric bursitis      Present on Admission:   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   Essential hypertension   Multiple myeloma not having achieved remission ([facility]   Major depressive disorder, recurrent episode, in full remission ([place] [address] )      Admitting Diagnosis: Hematuria [R31 9]  Acute kidney injury ([facility] ) [N17 9]  Abnormal blood chemistry test [R79 9]  Age/Sex: 53 y o  female  Admission Orders:  JOSEP CHO CONTROLLED DIET, Accuchecks QAC and QHS with coverage  OOB WITH ASSIST  SCD  Current Facility-Administered Medications:  acetaminophen 650 mg Oral Q6H PRN   amLODIPine 10 mg Oral QAM   ARIPiprazole 30 mg Oral HS   aspirin 81 mg Oral Daily   busPIRone 5 mg Oral BID   cefTRIAXone 1,000 mg Intravenous Q24H   cholecalciferol 1,000 Units Oral Daily   cloNIDine 0 3 mg Oral Q8H [facility]   diphenhydrAMINE 25 mg Oral Q6H PRN   doxazosin 2 mg Oral HS   DULoxetine 60 mg Oral BID   folic acid 5,930 mcg Oral Daily   heparin (porcine) 5,000 Units Subcutaneous Q8H [facility]   hydrALAZINE 10 mg Intravenous Q6H PRN hydrALAZINE 50 mg Oral Q8H [address]   insulin glargine 10 Units Subcutaneous HS   insulin lispro 1-5 Units Subcutaneous TID AC   insulin lispro 1-5 Units Subcutaneous HS   levothyroxine 125 mcg Oral Early Morning   LORazepam 2 mg Oral TID PRN   methylPREDNISolone sodium succinate 40 mg Intravenous Q12H [address]   metoprolol tartrate 50 mg Oral Q12H ARUNA   ondansetron 4 mg Intravenous Q6H PRN   pravastatin 80 mg Oral QPM   rOPINIRole 0 25 mg Oral BID   sertraline 200 mg Oral Daily   sevelamer 800 mg Oral TID With Meals   sodium bicarbonate 1,300 mg Oral TID   sodium chloride 100 mL/hr Intravenous Continuous   tacrolimus 2 mg Oral QPM   tacrolimus 3 mg Oral Daily       IP CONSULT TO NEPHROLOGY    Network Utilization Review Department  Phone: [phone]; Fax [phone]  [email or web address]  ATTENTION: Please call with any questions or concerns to [phone]  and carefully listen to the prompts so that you are directed to the right person  Send all requests for admission clinical reviews, approved or denied determinations and any other requests to fax [phone]   All voicemails are confidential

## 2019-08-15 NOTE — ASSESSMENT & PLAN NOTE
Hold lenalidomide for now  Monitor calcium level  Outpatient Hematology follow-up    8/14-discussed patient briefly with Oncology, stated Revlimid as common cause of rash, we will continue to hold medication at this time  Will defer to outpatient oncology for alternative medications or dose reduction

## 2019-08-15 NOTE — CONSULTS
Acute renal failure superimposed on stage III CKD  - follows outpatient with Dr Barb Cervantes, per his last note June 2019 patient has baseline creatinine of 1 6-1 9  - Cr on admission 2 63 --> 2 46--> 2 36, continue trending BMP  - likely due to combination of poor oral intake this past week vs dehydration vs increase lasik dose, but given extensive renal history cannot r/o intrarenal issues  - continue IV fluid for hydration  - tacrolimus levels within normal limits, 4 2  - ultrasound kidney bladder 08/14/2019:  Atrophic native kidneys, elevated arterial resistive indices in the transplant kidney, increased from 0 7 to 0 8  No hydronephrosis or obstruction noted  - pt has been on tacrolimus and prednisone combination for years, makes transplant rejection less likely, will continue to monitor levels and symptoms  Abnormal UA  - patient has history of recurring UTI, pyelonephritis, recurrent 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  - positive leukocytes, positive nitrates, 100, 2+ protein, positive blood  - Urine culture pending  - Pt currently on Rocephin 1mg IV, will modify according to antibiotic susceptibility results    Multiple myeloma  - follows outpatient with Dr Barragan  - progressive IgG kappa multiple myeloma diagnosed in April 2019  - patient was started on 5 mg p o  lenalidomide 6 days ago, currently held  - hematology was consulted by primary team and is following      HISTORY OF PRESENT ILLNESS   Reason for Admission / Principal Problem:  Acute renal failure superimposed on stage III CKD  Reason for Consult:  EDUARDO, Abnormal UA    Richelle Shirley 53 y o  female who has a past medical history of CKD stage 3, multiple myeloma, hypertension, type 1 diabetes complicated with neuropathy and nephropathy, status post renal and pancreatic transplant 1998, hypothyroidism, recurring UTI/pyelonephritis, recurrent EDUARDO due to volume depletion   She sees Dr Barb Cervantes with [facility], he prompted her to come to the ED yesterday as her UA showed multiple nitrites, leukocytes, blood, and she was noted to have elevated creatinine  She has also had a worsening rash which started 2 days ago  Patient was diagnosed with multiple myeloma in April of 2019, follows with Dr Karen Wing, started on new chemotherapy Lenalidomide about 6 days ago  Patient has history of rash occurrences post chemotherapy treatment  Patient was seen today lying comfortably in bed  She denies being in any pain currently  Admits to having mild dysuria, increase in frequency, and decrease in urinary output every time she goes to the bathroom  It has been about the same for the last few days  Denies fevers or chills  Patient states she has had about 5 episodes of UTI in the last year  Denies any recent use of NSAID, ibuprofen, Aleve  Patient is aware of these nephrotoxic agents  Per patient she was started on higher dose of Lasix about 1 week ago due to fluid overload  She usually notices swelling increase in her face and hands  Ever since she started the new chemotherapy about 6 days ago she has not really been eating or drinking due to the metallic taste from the drug  She is also complaining of a bilateral maculopapular rash that started on her thighs, and is now spreading towards the back  Describes is non itchy, nonpainful  Patient is concerned about her next chemotherapy dose and would like to know the schedule plan  REVIEW OF SYSTEMS   Review of Systems   Constitutional: Positive for appetite change  Negative for activity change, fatigue and fever  HENT: Negative for sore throat  Eyes: Negative for visual disturbance  Respiratory: Negative for cough, shortness of breath and wheezing  Cardiovascular: Negative for leg swelling  Gastrointestinal: Negative for abdominal distention, abdominal pain, diarrhea, nausea and vomiting  Genitourinary: Positive for difficulty urinating, dysuria, frequency and hematuria  Negative for flank pain  Neurological: Negative for dizziness and weakness  Psychiatric/Behavioral: Negative for behavioral problems  OBJECTIVE     Vitals:    08/15/19 0555 08/15/19 0720 08/15/19 0721 08/15/19 0722   BP: (!) 171/63 169/63 169/63    BP Location:       Pulse:  75 75 72   Resp:  18     Temp:  98 1 °F (36 7 °C) 98 1 °F (36 7 °C)    TempSrc:       SpO2:  96% 97% 95%   Weight:       Height:          Temperature:   Temp (24hrs), Av 2 °F (36 8 °C), Min:98 1 °F (36 7 °C), Max:98 8 °F (37 1 °C)    Temperature: 98 1 °F (36 7 °C)  Intake & Output:  I/O        07 -  0700  07 - 08/15 0700 08/15 07 -  0700    P  O   0 300    I V  (mL/kg)  951 7 (9 3)     Total Intake(mL/kg)  951 7 (9 3) 300 (2 9)    Urine (mL/kg/hr)  1500     Total Output  1500     Net  -548 3 +300               Weights:   IBW: 63 9 kg    Body mass index is 34 19 kg/m²  Weight (last 2 days)     Date/Time   Weight    19 1952   102 (224 87)    19 1434   103 (227 07)            Physical Exam   Constitutional: She is oriented to person, place, and time  HENT:   Head: Normocephalic and atraumatic  Mouth/Throat: No oropharyngeal exudate  Eyes: No scleral icterus  Neck: Neck supple  No tracheal deviation present  Cardiovascular: Normal rate  Exam reveals no gallop and no friction rub  No murmur heard  Pulmonary/Chest: Effort normal  No respiratory distress  She has no wheezes  She exhibits no tenderness  Abdominal: Soft  She exhibits no mass  There is no tenderness  Musculoskeletal:   Right big toe amputated  Neurological: She is alert and oriented to person, place, and time  A sensory deficit is present  Skin: Skin is dry     Psychiatric: Her behavior is normal      PAST MEDICAL HISTORY     Past Medical History:   Diagnosis Date    Abnormal liver function test     [facility] )     Acute on chronic congestive heart failure (HCC)     Allergic urticaria     Anemia     [facility]     Multiple myeloma    Cervical dysplasia     Cholelithiasis     Chronic diastolic (congestive) heart failure (HCC) 9/18/2017    Diabetes mellitus [facility]     Previous, controlled with diet    Diabetes mellitus with foot ulcer ([facility] )     Disease of thyroid gland     Encephalopathy     Hematuria     + leak est- secondary to UTIs/panc drainage    History of transfusion     Hyperkalemia     Hypertension     Iliotibial band syndrome     Lumbar radiculopathy     Multiple myeloma (HCC)     Multiple myeloma ([facility] )     Night blindness     Nonrheumatic aortic (valve) insufficiency     Pneumonia     Renal disorder     Retinopathy     Seborrhea     Seizure ([facility] )     Shingles     Sinus tachycardia     B blocker - cardio echo stress test 02 normal/neg LE doppler 2/02 OK and 12/07    [facility]     Toe amputation status ([facility]     Trochanteric bursitis      PAST SURGICAL HISTORY     Past Surgical History:   Procedure Laterality Date    CATARACT EXTRACTION      CHOLECYSTECTOMY      COLONOSCOPY      two polyps in the rectum removed and biopsied diverticulosis in the sigmoid colon, external hemorrhoiods- Dr Barfield    COMBINED KIDNEY-PANCREAS TRANSPLANT N/A     CT BONE MARROW BIOPSY AND ASPIRATION  4/17/2019    CYSTOSCOPY N/A 10/13/2016    Procedure: CYSTOSCOPY, retrograde pyelogram, biopsy of ureteral polyp; Surgeon: Yady Nagel MD;  Location: [facility];  Service:    [place] DILATION AND CURETTAGE OF UTERUS      ESOPHAGOGASTRODUODENOSCOPY N/A 11/20/2017    Procedure: ESOPHAGOGASTRODUODENOSCOPY (EGD); Surgeon: Viviane Buitrago DO;  Location: [facility];   Service: Gastroenterology    EYE SURGERY      cataracts    FOOT AMPUTATION THROUGH METATARSAL Left     FOOT SURGERY Right     excision of metatarsal heads    HALLUX VALGUS CORRECTION Right     NEPHRECTOMY TRANSPLANTED ORGAN      PANCREATIC TRANSPLANT REMOVAL  1998     SOCIAL & FAMILY HISTORY     Social History Substance and Sexual Activity   Alcohol Use Never    Frequency: Never    Binge frequency: Never    Comment: (history)     Social History     Substance and Sexual Activity   Drug Use Never    Types: Hydrocodone    Comment: Past cocaine use many years ago - no current use     Social History     Tobacco Use   Smoking Status Former Smoker    Last attempt to quit: 2012    Years since quittin 3   Smokeless Tobacco Never Used     Family History   Problem Relation Age of Onset    Hypertension Mother     Cancer Mother     Hypertension Father     Cancer Father     Cancer Maternal Grandfather     Cancer Paternal Grandmother     Cancer Paternal Grandfather     Depression Sister     Breast cancer Maternal Grandmother 77     LABORATORY DATA     Labs: I have personally reviewed pertinent reports  Results from last 7 days   Lab Units 08/15/19  0530 19  1600 19  0649   WBC Thousand/uL 8 40 7 62 7 62   HEMOGLOBIN g/dL 9 0* 9 5* 9 4*   HEMATOCRIT % 29 2* 30 3* 30 1*   PLATELETS Thousands/uL 191 211 206   NEUTROS PCT %  --  74  --    MONOS PCT %  --  5  --    MONO PCT % 0*  --   --     Results from last 7 days   Lab Units 08/15/19  0530 19  1600 19  0649   POTASSIUM mmol/L 4 1 3 5 3 6   CHLORIDE mmol/L 111* 107 105   CO2 mmol/L 22 22 22   BUN mg/dL 52* 55* 51*   CREATININE mg/dL 2 36* 2 46* 2 63*   CALCIUM mg/dL 8 3 9 0 8 8   ALK PHOS U/L 85  --  94   ALT U/L 17  --  18   AST U/L 10  --  9     Results from last 7 days   Lab Units 08/15/19  0530 19  0649   MAGNESIUM mg/dL 2 0 2 1     Results from last 7 days   Lab Units 08/15/19  0530 19  0649   PHOSPHORUS mg/dL 3 8 4 1                  Micro:  Lab Results   Component Value Date    BLOODCX No Growth After 5 Days  2017    BLOODCX No Growth After 5 Days  2017    BLOODCX No Growth After 5 Days   2017    URINECX >100,000 cfu/ml Escherichia coli (A) 2019    URINECX >100,000 cfu/ml Escherichia coli (A) 11/26/2018    URINECX >100,000 cfu/ml Escherichia coli (A) 11/19/2018     IMAGING & DIAGNOSTIC TESTS     Imaging: I have personally reviewed pertinent reports  [facility]    Result Date: 8/14/2019  Impression: Atrophic native kidneys  Elevated arterial resistive indices in the transplant kidney up to 0 8 (previously up to 0 7) concerning for potential development of transplant rejection or ATN  Nephrology consult recommended  No hydronephrosis or renal vein thrombosis identified  No perinephric fluid collections  The study was marked in [facility] for immediate notification  Workstation performed: [id number]     EKG, Pathology, and Other Studies: I have personally reviewed pertinent reports  ALLERGIES     Allergies   Allergen Reactions    Cefadroxil     Morphine Other (See Comments)     Other reaction(s): Other (See Comments)  projectile vomiting    Morphine And Related GI Intolerance    Myrbetriq [Mirabegron] Hives    Penicillins Hives     Other reaction(s): Other (See Comments)  Respiratory Distress,hives  Tolerates cefazolin     MEDICATIONS PRIOR TO ARRIVAL     Prior to Admission medications    Medication Sig Start Date End Date Taking?  Authorizing Provider   amLODIPine (NORVASC) 10 mg tablet TAKE 1 TABLET(10MG) BY MOUTH EVERY MORNING AND 1/2 TABLET(5MG) EVERY EVENING 4/8/19  Yes Terrie Sarkar MD   ARIPiprazole (ABILIFY) 30 mg tablet Take 1 tablet (30 mg total) by mouth daily at bedtime for 180 days 6/5/19 12/2/19 Yes Del Monk MD   aspirin 81 MG tablet Take 1 tablet by mouth daily 6/5/13  Yes Historical Provider, MD   busPIRone (BUSPAR) 5 mg tablet Take 1 tablet (5 mg total) by mouth 2 (two) times a day for 180 days 6/5/19 12/2/19 Yes Del Monk MD   Cholecalciferol (VITAMIN D3) 1000 units CAPS Take 3 capsules by mouth daily     Yes Historical Provider, MD   cloNIDine (CATAPRES) 0 1 mg tablet TAKE 3 TABLETS BY MOUTH EVERY MORNING, 3 TABLETS AT NOON, AND 3 TABLETS EVERY EVENING 5/26/19  Yes MER Bustillo   doxazosin (CARDURA) 1 mg tablet Take 2 tablets (2 mg total) by mouth daily at bedtime 2/25/19  Yes Alexys James MD   DULoxetine (CYMBALTA) 60 mg delayed release capsule Take 1 capsule (60 mg total) by mouth 2 (two) times a day for 180 days 6/5/19 12/2/19 Yes Paresh Morales MD   folic acid (FOLVITE) 1 mg tablet TAKE 1 TABLET BY MOUTH DAILY AS DIRECTED 2/22/19  Yes Jessica Woodall MD   furosemide (LASIX) 20 mg tablet TAKE 1 TABLET BY MOUTH EVERY DAY 7/20/19  Yes Alexys James MD   hydrALAZINE (APRESOLINE) 50 mg tablet TAKE 1 TABLET(50MG) BY MOUTH THREE TIMES DAILY FOR 90 DAYS 7/20/19  Yes Alexys James MD   Insulin Glargine (TOUJEO SOLOSTAR) 300 units/mL CONCETRATED U-300 injection pen Inject 1 Units under the skin daily at bedtime 1/10/19  Yes Gloria Guo MD   insulin lispro (HUMALOG KWIKPEN) 100 units/mL injection pen INJECT 10 UNDER THE SKIN EVERY DAY OR AS DIRECTED  Patient taking differently: INJECT 10 UNDER THE SKIN EVERY DAY OR AS DIRECTED 5/21/19  Yes Gloria Guo MD   Insulin Pen Needle (BD PEN NEEDLE VISHNU U/F) 32G X 4 MM MISC Use 4 times daily 8/23/18  Yes Gloria Guo MD   Lancets (ONETOUCH ULTRASOFT) lancets by Does not apply route 4 (four) times a day   3/9/16  Yes Historical Provider, MD   lenalidomide (REVLIMID) 5 MG CAPS Take 1 capsule (5 mg total) by mouth daily for 21 days Auth   # [id number] 7/30/19 7/31/19 8/21/19 Yes Chika Ruby MD   levothyroxine 125 mcg tablet Take 1 tablet (125 mcg total) by mouth daily 6/24/19  Yes Nya Lutz PA-C   LORazepam (ATIVAN) 2 mg tablet Take 1 tablet (2 mg total) by mouth 3 (three) times a day as needed for anxiety for up to 120 days To be filled on or after 6/29/19 6/29/19 10/27/19 Yes Paresh Morales MD   methylPREDNISolone 4 MG tablet therapy pack Use as directed on package 7/9/19  Yes Brad Cook PA-C   metoprolol tartrate (LOPRESSOR) 50 mg tablet TAKE 1 TABLET(50MG TOTAL) BY MOUTH TWICE DAILY 7/9/18  Yes Chuy Rainey MD   ONE TOUCH ULTRA TEST test strip Use 4 times daily ( please dispense One touch Ultra test strips) 7/9/19  Yes Cortney Schultz MD   pravastatin (PRAVACHOL) 80 mg tablet TAKE 1 TABLET BY MOUTH DAILY 1/29/18  Yes Sary Barriga MD   predniSONE 5 mg tablet Take 1 tablet (5 mg total) by mouth daily 5/24/19  Yes Aleck Goodpasture, CRNP   rOPINIRole (REQUIP) 0 25 mg tablet TAKE 1 TABLET BY MOUTH TWICE DAILY 2/21/19  Yes Sary Barriga MD   sertraline (ZOLOFT) 100 mg tablet Take 2 tablets (200 mg total) by mouth daily for 180 days 6/5/19 12/2/19 Yes Jolene Hamlin MD   sevelamer carbonate (RENVELA) 800 mg tablet Take 1 tablet (800 mg total) by mouth 3 (three) times a day with meals 7/18/19  Yes Chuy Rainey MD   sodium bicarbonate 650 mg tablet TAKE 2 TABLETS BY MOUTH THREE TIMES DAILY 5/28/19  Yes Chuy Rainey MD   tacrolimus (PROGRAF) 1 mg capsule Take 3 mg in AM and 2 mg in PM (6/18/19) 6/18/19  Yes Chuy Rainey MD   clindamycin (CLEOCIN) 300 MG capsule Take 300 mg by mouth 4 (four) times a day    Historical Provider, MD   Ixazomib Citrate (NINLARO) 4 MG CAPS Take 4 mg once weekly for 3 weeks on and 1 week off  Patient not taking: Reported on 8/14/2019 6/4/19   Ebenezer Fair MD     MEDICATIONS ADMINISTERED IN LAST 24 HOURS     Medication Administration - last 24 hours from 08/14/2019 0913 to 08/15/2019 0913       Date/Time Order Dose Route Action Action by     08/14/2019 2042 amLODIPine (NORVASC) tablet 5 mg 5 mg Oral Given Sugar Menchaca RN     08/14/2019 2250 ARIPiprazole (ABILIFY) tablet 30 mg 30 mg Oral Given Sugar Menchaca, RN     08/15/2019 0845 aspirin chewable tablet 81 mg 81 mg Oral Given Doyal Leyden, RN     08/15/2019 0844 busPIRone (BUSPAR) tablet 5 mg 5 mg Oral Given Doyal Leyden, RN     08/14/2019 2042 busPIRone (BUSPAR) tablet 5 mg 5 mg Oral Given Sugar Menchaca RN     08/15/2019 7136 cholecalciferol (VITAMIN D3) tablet 1,000 Units 1,000 Units Oral Given Valorie Spurling, RN     08/15/2019 0555 cloNIDine (CATAPRES) tablet 0 3 mg 0 3 mg Oral Given [facility], RN     08/14/2019 2250 cloNIDine (CATAPRES) tablet 0 3 mg 0 3 mg Oral Given [facility], RN     08/14/2019 2250 doxazosin (CARDURA) tablet 2 mg 2 mg Oral Given [facility], RN     08/15/2019 0844 DULoxetine (CYMBALTA) delayed release capsule 60 mg 60 mg Oral Given Valorie Spurling, RN     08/14/2019 2042 DULoxetine (CYMBALTA) delayed release capsule 60 mg 60 mg Oral Given [facility], RN     62/97/8893 7810 folic acid (FOLVITE) tablet 1,000 mcg 1,000 mcg Oral Given Valorie Spurling, RN     08/15/2019 0555 hydrALAZINE (APRESOLINE) tablet 50 mg 50 mg Oral Given [facility], RN     08/14/2019 2250 hydrALAZINE (APRESOLINE) tablet 50 mg 50 mg Oral Given [facility], RN     08/15/2019 0556 levothyroxine tablet 125 mcg 125 mcg Oral Given [facility], RN     08/15/2019 0847 metoprolol tartrate (LOPRESSOR) tablet 50 mg 50 mg Oral Given Valorie Spurling, RN     08/14/2019 2044 metoprolol tartrate (LOPRESSOR) tablet 50 mg 50 mg Oral Given [facility], RN     08/14/2019 2042 pravastatin (PRAVACHOL) tablet 80 mg 80 mg Oral Given [facility], RN     08/15/2019 0848 rOPINIRole (REQUIP) tablet 0 25 mg 0 25 mg Oral Given Valorie Spurling, RN     08/14/2019 2035 rOPINIRole (REQUIP) tablet 0 25 mg 0 25 mg Oral Given [facility], RN     08/15/2019 0844 sertraline (ZOLOFT) tablet 200 mg 200 mg Oral Given Valorie Spurling, RN     08/15/2019 0845 tacrolimus (PROGRAF) capsule 3 mg 3 mg Oral Given Valorie Spurling, RN     08/14/2019 2101 tacrolimus (PROGRAF) capsule 2 mg 2 mg Oral Given [facility], RN     08/15/2019 0844 sevelamer (RENAGEL) tablet 800 mg 800 mg Oral Given Valorie Spurling, RN     08/15/2019 0844 sodium bicarbonate tablet 1,300 mg 1,300 mg Oral Given Valorie Spurling, RN     08/14/2019 2048 sodium bicarbonate tablet 1,300 mg 1,300 mg Oral Given Estiven Schmid RN     08/15/2019 0555 heparin (porcine) subcutaneous injection 5,000 Units 5,000 Units Subcutaneous Given Estiven Schmid RN     08/14/2019 2250 heparin (porcine) subcutaneous injection 5,000 Units 5,000 Units Subcutaneous Given Estiven Schmid RN     08/15/2019 0600 sodium chloride 0 9 % infusion 100 mL/hr Intravenous New Bag Estiven Binu, [address] [place] [address]     08/14/2019 2029 sodium chloride 0 9 % infusion 100 mL/hr [facility] Estiven Binu, [address] [place] [address]     08/15/2019 3654 methylPREDNISolone sodium succinate (Solu-MEDROL) injection 40 mg 40 mg Intravenous Given Ana Ortega RN     08/14/2019 2043 methylPREDNISolone sodium succinate (Solu-MEDROL) injection 40 mg 40 mg Intravenous Given Estiven Schmid RN     08/15/2019 7553 diphenhydrAMINE (BENADRYL) tablet 25 mg 25 mg Oral Given Estiven Schmid RN     08/14/2019 2058 diphenhydrAMINE (BENADRYL) tablet 25 mg 25 mg Oral Given Estiven Schmid RN     08/14/2019 2034 ceftriaxone (ROCEPHIN) 1 g/50 mL in dextrose IVPB 1,000 mg Intravenous [address] Estiven Schmid RN     08/15/2019 0840 insulin lispro (HumaLOG) 100 units/mL subcutaneous injection 1-5 Units 1 Units Subcutaneous Not Given Ana Ortega RN     08/14/2019 2250 insulin lispro (HumaLOG) 100 units/mL subcutaneous injection 1-5 Units 1 Units Subcutaneous Given Estiven Schmid RN     08/14/2019 2250 insulin glargine (LANTUS) subcutaneous injection 10 Units 0 1 mL 10 Units Subcutaneous Given Estiven Schmid RN     08/15/2019 0847 amLODIPine (NORVASC) tablet 10 mg 10 mg Oral Given Ana Ortega RN        CURRENT MEDICATIONS     Current Facility-Administered Medications:  acetaminophen 650 mg Oral Q6H PRN Florencia Munguia, DO    amLODIPine 10 mg Oral QAM Florencia Munguia, DO    amLODIPine 5 mg Oral QPM Florencia Munguia, DO    ARIPiprazole 30 mg Oral HS Florencia Munguia, DO    aspirin 81 mg Oral Daily Florencia Munguia, DO    busPIRone 5 mg Oral BID Florencia Munguia DO    cefTRIAXone 1,000 mg Intravenous Q24H Florencia Munguia DO Last Rate: 1,000 mg (08/14/19 2034)   cholecalciferol 1,000 Units Oral Daily Elridge Sida, DO    cloNIDine 0 3 mg Oral [facility] Elridge Sida, DO    diphenhydrAMINE 25 mg Oral Q6H PRN Elridge Sida, DO    doxazosin 2 mg Oral HS Elridge Sida, DO    DULoxetine 60 mg Oral BID Elridge Sida, DO    folic acid 6,631 mcg Oral Daily Elridge Sida, DO    heparin (porcine) 5,000 Units Subcutaneous [facility] Elridge Sida, DO    hydrALAZINE 50 mg Oral [facility] Elrid Sida, DO    insulin glargine 10 Units Subcutaneous HS Elridge Sida, DO    insulin lispro 1-5 Units Subcutaneous TID AC Elridge Sida, DO    insulin lispro 1-5 Units Subcutaneous HS Elridge Sida, DO    levothyroxine 125 mcg Oral Early Morning Elridge Sida, DO    LORazepam 2 mg Oral TID PRN ridge Sida, DO    methylPREDNISolone sodium succinate 40 mg Intravenous Q12H [facility], DO    metoprolol tartrate 50 mg Oral Q12H [facility] Sida, DO    ondansetron 4 mg Intravenous Q6H PRN Elridge Sida, DO    pravastatin 80 mg Oral QPM Elridge Sida, DO    rOPINIRole 0 25 mg Oral BID Elridge Sida, DO    sertraline 200 mg Oral Daily Elridge Sida, DO    sevelamer 800 mg Oral TID With Meals Elridge Sida, DO    sodium bicarbonate 1,300 mg Oral TID Elridge Sida, DO    sodium chloride 100 mL/hr Intravenous Continuous Elridge Sida, DO Last Rate: 100 mL/hr (08/15/19 0600)   tacrolimus 2 mg Oral QPM Elridge Sida, DO    tacrolimus 3 mg Oral Daily Elridge Sida, DO        sodium chloride 100 mL/hr Last Rate: 100 mL/hr (08/15/19 0600)       acetaminophen 650 mg Q6H PRN   diphenhydrAMINE 25 mg Q6H PRN   LORazepam 2 mg TID PRN   ondansetron 4 mg Q6H PRN       Portions of the record may have been created with voice recognition software  Occasional wrong word or "sound a like" substitutions may have occurred due to the inherent limitations of voice recognition software    Read the chart carefully and recognize, using context, where substitutions have occurred     ==  DO Meño Hernandez  [facility]  Internal Medicine Residency

## 2019-08-15 NOTE — ASSESSMENT & PLAN NOTE
- pt with last A1C = 5 1    - home meds include   - Insulin - Long-acting, toujeo 1 U qhs       - Short-acting 10 U preprandial    - hold home oral medications while inpatient  - start Low-CSI  - BG check qc/qhs with goal -180

## 2019-08-15 NOTE — ASSESSMENT & PLAN NOTE
Worsening creatinine with Hematuria and pyuria  Lasix dose was increased 1 week ago from 20 mg to 60 mg daily  Started on  new chemotherapy Lenalidomide  6 days ago  Decreased oral intake  Hold Lasix  Start IV fluid for hydration  Hold lenalidomide for now  Consult Nephrology for further management  Follow on renal ultrasound    8/15-ultrasound completed on transplant kidney, concerning for ATN versus possible rejection  Prograf levels are within normal limits, however common cause of ATN  Bk virus labs and progress  EDUARDO improved overnight, serum creatinine now 2 36 down from 2 63  BUN creatinine ratio consistent with prerenal etiology  Will continue gentle IV hydration at this time and monitor  Nephrology consulted and appreciate their recommendations

## 2019-08-15 NOTE — RESTORATIVE TECHNICIAN NOTE
Restorative Specialist Mobility Note       Activity: Ambulate in barron, Ambulate in room, Bathroom privileges, Chair, Dangle, Stand at bedside(Educated/encouraged pt to ambulate with assistance 3-4 x's/day  Bed alarm on   Pt callbell, phone/tray within reach )     Assistive Device: Kanwal STONE, Restorative Technician, [employer] [facility]

## 2019-08-15 NOTE — PROGRESS NOTES
Progress Note - Bjorn Baumgarten 1965, 53 y o  female MRN: [id number]    Unit/Bed#: [facility] 914-01 Encounter: [id number]    Primary Care Provider: Yoly Mondragon MD   Date and time admitted to hospital: 8/14/2019  2:40 PM        Major depressive disorder, recurrent episode, in full remission [facility])  Assessment & Plan  Continue home meds  Patient denies acute issues, denies SI or HI    Renal transplant recipient  Assessment & Plan  Patient is status post kidney and pancreatic transplant in 1998  Continue Prograf and steroids  Nephrology consulted for further management, appreciate their recommendations    Abnormal urinalysis  Assessment & Plan  Patient reported urinary frequency over the past couple days associated with decreasing oral intake  No dysuria  No fever or chills  Rule out UTI  Start IV antibiotic empirically  Follow on urine cultures    8/15-patient reports dysuria, empiric antibiotics started yesterday  Will follow urine cultures and adjust antibiotic therapy as needed  Vital signs stable, white count within normal limits  Skin rash  Assessment & Plan  Pleuritic Maculopapular rash mostly on bilateral thigh and groin area  Started yesterday  Rule out secondary to drug rash  Hold lenalidomide  Change prednisone to IV Solu-Medrol  Continue supportive care    8/15-patient reports rash improved dramatically overnight, she attributes this to the IV Solu-Medrol and Benadryl  Revlimid stopped and will defer to Primary Oncology as outpatient to find alternative treatments or dose reduction  Discussed with Oncology here who states that rash following Revlimid administration is quite common  Monitoring continue present therapy      Multiple myeloma not having achieved remission [facility])  Assessment & Plan  Hold lenalidomide for now  Monitor calcium level  Outpatient Hematology follow-up    8/14-discussed patient briefly with Oncology, stated Revlimid as common cause of rash, we will continue to hold medication at this time  Will defer to outpatient oncology for alternative medications or dose reduction  Essential hypertension  Assessment & Plan  Monitor blood pressure  Continue Lopressor, hydralazine, Cardura, clonidine and amlodipine    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 (HCC)  Assessment & Plan  - pt with last A1C = 5 1    - home meds include   - Insulin - Long-acting, toujeo 1 U qhs       - Short-acting 10 U preprandial    - hold home oral medications while inpatient  - start Low-CSI  - BG check qc/qhs with goal -180      * Acute renal failure superimposed on stage 3 chronic kidney disease (HCC)  Assessment & Plan  Worsening creatinine with Hematuria and pyuria  Lasix dose was increased 1 week ago from 20 mg to 60 mg daily  Started on  new chemotherapy Lenalidomide  6 days ago  Decreased oral intake  Hold Lasix  Start IV fluid for hydration  Hold lenalidomide for now  Consult Nephrology for further management  Follow on renal ultrasound    8/15-ultrasound completed on transplant kidney, concerning for ATN versus possible rejection  Prograf levels are within normal limits, however common cause of ATN  Bk virus labs and progress  EDUARDO improved overnight, serum creatinine now 2 36 down from 2 63  BUN creatinine ratio consistent with prerenal etiology  Will continue gentle IV hydration at this time and monitor  Nephrology consulted and appreciate their recommendations  VTE Pharmacologic Prophylaxis:   Pharmacologic: Heparin  Mechanical VTE Prophylaxis in Place: Yes    Patient Centered Rounds: I have performed bedside rounds with nursing staff today  Discussions with Specialists or Other Care Team Provider:  Discussed with Oncology    Education and Discussions with Family / Patient: Discussed treatment plan with family and patient who agree with current plan; encouraged to ask questions and participate  Time Spent for Care: 45 minutes    More than 50% of total time spent on counseling and coordination of care as described above  Current Length of Stay: 1 day(s)    Current Patient Status: Inpatient   Certification Statement: The patient will continue to require additional inpatient hospital stay due to Assessment and treatment of EDUARDO and rash    Discharge Plan: TBD    Code Status: Level 1 - Full Code      Subjective:   Patient seen and examined bedside, in no apparent distress or discomfort  Patient states that her rash has improved overnight  She has mild dysuria, continued to be treated with ceftriaxone for bacteriuria; cultures pending  Discussed case with Oncology who feel rash is likely secondary to Revlimid  That medication has been stopped, will defer to Primary Oncology for re-initiation of alternate therapy or reduce dosing of Revlimid  Continue administration of steroids and Benadryl for rash treatment  EDUARDO improving overnight with fluids, patient will be assessed by Nephrology  Objective:     Vitals:   Temp (24hrs), Av 2 °F (36 8 °C), Min:98 1 °F (36 7 °C), Max:98 8 °F (37 1 °C)    Temp:  [98 1 °F (36 7 °C)-98 8 °F (37 1 °C)] 98 1 °F (36 7 °C)  HR:  [61-75] 72  Resp:  [18] 18  BP: (137-192)/(57-78) 169/63  SpO2:  [95 %-98 %] 95 %  Body mass index is 34 19 kg/m²  Input and Output Summary (last 24 hours): Intake/Output Summary (Last 24 hours) at 8/15/2019 1246  Last data filed at 8/15/2019 1130  Gross per 24 hour   Intake 1251 67 ml   Output 1900 ml   Net -648  33 ml       Physical Exam:     Physical Exam   Constitutional: She is oriented to person, place, and time  She appears well-developed and well-nourished  No distress  HENT:   Head: Normocephalic and atraumatic  Nose: Nose normal    Mouth/Throat: Oropharynx is clear and moist  No oropharyngeal exudate  Eyes: Pupils are equal, round, and reactive to light  Conjunctivae and EOM are normal  Right eye exhibits no discharge  Left eye exhibits no discharge   No scleral icterus  Neck: Normal range of motion  No JVD present  No tracheal deviation present  Cardiovascular: Normal rate, regular rhythm and normal heart sounds  Exam reveals no gallop and no friction rub  No murmur heard  Pulmonary/Chest: Effort normal and breath sounds normal  No stridor  No respiratory distress  She has no wheezes  She has no rales  Abdominal: Soft  She exhibits no distension and no mass  There is no tenderness  There is no rebound and no guarding  Musculoskeletal: Normal range of motion  She exhibits deformity (partial left foot amputation)  She exhibits no edema or tenderness  Neurological: She is alert and oriented to person, place, and time  Skin: Skin is warm and dry  Rash (maculopapular rash on bilater anterior thighs) noted  She is not diaphoretic  Psychiatric: She has a normal mood and affect  Her behavior is normal  Judgment normal          Additional Data:     Labs:    Results from last 7 days   Lab Units 08/15/19  0530 08/14/19  1600   WBC Thousand/uL 8 40 7 62   HEMOGLOBIN g/dL 9 0* 9 5*   HEMATOCRIT % 29 2* 30 3*   PLATELETS Thousands/uL 191 211   NEUTROS PCT %  --  74   LYMPHS PCT %  --  15   LYMPHO PCT % 4*  --    MONOS PCT %  --  5   MONO PCT % 0*  --    EOS PCT % 0 5     Results from last 7 days   Lab Units 08/15/19  0530   SODIUM mmol/L 141   POTASSIUM mmol/L 4 1   CHLORIDE mmol/L 111*   CO2 mmol/L 22   BUN mg/dL 52*   CREATININE mg/dL 2 36*   ANION GAP mmol/L 8   CALCIUM mg/dL 8 3   ALBUMIN g/dL 3 0*   TOTAL BILIRUBIN mg/dL 0 33   ALK PHOS U/L 85   ALT U/L 17   AST U/L 10   GLUCOSE RANDOM mg/dL 148*         Results from last 7 days   Lab Units 08/15/19  1127 08/15/19  0741 08/14/19  2256 08/14/19  2118   POC GLUCOSE mg/dl 117 130 153* 123                   * I Have Reviewed All Lab Data Listed Above  * Additional Pertinent Lab Tests Reviewed:  All Labs Within Last 24 Hours Reviewed    Imaging:    Imaging Reports Reviewed Today Include: U/S kidney  Imaging Personally Reviewed by Myself Includes:  none    Recent Cultures (last 7 days):           Last 24 Hours Medication List:     Current Facility-Administered Medications:  acetaminophen 650 mg Oral Q6H PRN [address], DO    amLODIPine 10 mg Oral QAM [address], DO    amLODIPine 5 mg Oral QPM [address]vira [address], DO    ARIPiprazole 30 mg Oral HS [address]vira [address], DO    aspirin 81 mg Oral Daily [address]vira [address], DO    busPIRone 5 mg Oral BID [address]vira [address], DO    cefTRIAXone 1,000 mg Intravenous Q24H [address]vira [address], DO Last Rate: 1,000 mg (08/14/19 2034)   cholecalciferol 1,000 Units Oral Daily [address]vira [address], DO    cloNIDine 0 3 mg Oral [facility] Elvira [address], DO    diphenhydrAMINE 25 mg Oral Q6H PRN Elvira [address], DO    doxazosin 2 mg Oral HS Elvira [address], DO    DULoxetine 60 mg Oral BID Elvira [address], DO    folic acid 5,136 mcg Oral Daily [address]vira [address], DO    heparin (porcine) 5,000 Units Subcutaneous [facility] Elvira [address], DO    hydrALAZINE 50 mg Oral [facility] [address], DO    insulin glargine 10 Units Subcutaneous HS [address]vira [address], DO    insulin lispro 1-5 Units Subcutaneous TID AC [address]vira [address], DO    insulin lispro 1-5 Units Subcutaneous HS [address]vira [address], DO    levothyroxine 125 mcg Oral Early Morning Elvira [address], DO    LORazepam 2 mg Oral TID PRN Elvira [address], DO    methylPREDNISolone sodium succinate 40 mg Intravenous Q12H [address], DO    metoprolol tartrate 50 mg Oral Q12H [address], DO    ondansetron 4 mg Intravenous Q6H PRN [address]vira [address], DO    pravastatin 80 mg Oral QPM [address]vira [address], DO    rOPINIRole 0 25 mg Oral BID [address]vira [address], DO    sertraline 200 mg Oral Daily [address]ira [address], DO    sevelamer 800 mg Oral TID With Meals [address]vira [address], DO    sodium bicarbonate 1,300 mg Oral TID [address]vira [address], DO    sodium chloride 100 mL/hr Intravenous Continuous [address]vira [address], DO Last Rate: 100 mL/hr (08/15/19 0600)   tacrolimus 2 mg Oral QPM [address]vira [address], DO    tacrolimus 3 mg Oral Daily [address]vira [address], DO         Today, Patient Was Seen By: Hermiol Skinner Samir Maldonado MD    ** Please Note: Dictation voice to text software may have been used in the creation of this document   **

## 2019-08-15 NOTE — SOCIAL WORK
Met with pt and explained Cm program/CM role  Resides alone in an apartment, 3 [address], bed/bathroom main floor  Independent prior to admission for ADL's/ambulation  She drives  PCP Dr Jenny Donnelly  Has prescription plan, uses [address]  DME: [facility]  [address] [facility] in the past  IP Rehab- yes, does not remember name  History depression, followed OP by psychiatrist   [facility] admission 4-5 years ago  Denies drug and/or alcohol abuse  Primary contact is mother Ibeth Eastman, [phone]  POA- father Napoleon Person, requested copy, no living will  Parents will provide transport home    CM reviewed d/c planning process including the following: identifying help at home, patient preference for d/c planning needs, Discharge Lounge, [facility] Meds to Bed program, availability of treatment team to discuss questions or concerns patient and/or family may have regarding understanding medications and recognizing signs and symptoms once discharged  CM also encouraged patient to follow up with all recommended appointments after discharge  Patient advised of importance for patient and family to participate in managing patients medical well being  Patient/caregiver received discharge checklist  Content reviewed  Patient/caregiver encouraged to participate in discharge plan of care prior to discharge home

## 2019-08-15 NOTE — ASSESSMENT & PLAN NOTE
Patient reported urinary frequency over the past couple days associated with decreasing oral intake  No dysuria  No fever or chills  Rule out UTI  Start IV antibiotic empirically  Follow on urine cultures    8/15-patient reports dysuria, empiric antibiotics started yesterday  Will follow urine cultures and adjust antibiotic therapy as needed  Vital signs stable, white count within normal limits

## 2019-08-15 NOTE — ASSESSMENT & PLAN NOTE
Pleuritic Maculopapular rash mostly on bilateral thigh and groin area  Started yesterday  Rule out secondary to drug rash  Hold lenalidomide  Change prednisone to IV Solu-Medrol  Continue supportive care    8/15-patient reports rash improved dramatically overnight, she attributes this to the IV Solu-Medrol and Benadryl  Revlimid stopped and will defer to Primary Oncology as outpatient to find alternative treatments or dose reduction  Discussed with Oncology here who states that rash following Revlimid administration is quite common  Monitoring continue present therapy  Will observe overnight and consider Dermatology consultation in the morning if not showing continued improvement

## 2019-08-15 NOTE — PHYSICIAN ADVISOR
Current patient class: Inpatient  The patient is currently on Hospital Day: 2 at [address] [place] [address]      The patient was admitted to the hospital at 1901 on 8/14/19 for the following diagnosis:  Hematuria [R31 9]  Acute kidney injury ([address] ) [N17 9]  Abnormal blood chemistry test [R79 9]       There is documentation in the medical record of an expected length of stay of at least 2 midnights  The patient is therefore expected to satisfy the 2 midnight benchmark and given the 2 midnight presumption is appropriate for INPATIENT ADMISSION  Given this expectation of a satisfying stay, CMS instructs us that the patient is most often appropriate for inpatient admission under part A provided medical necessity is documented in the chart  After review of the relevant documentation, labs, vital signs and test results, the patient is appropriate for INPATIENT ADMISSION  Admission to the hospital as an inpatient is a complex decision making process which requires the practitioner to consider the patients presenting complaint, history and physical examination and all relevant testing  With this in mind, in this case, the patient was deemed appropriate for INPATIENT ADMISSION  After review of the documentation and testing available at the time of the admission I concur with this clinical determination of medical necessity  Rationale is as follows: The patient is a 53 yrs old Female who presented to the ED at 8/14/2019  2:40 PM with a chief complaint of Abnormal Lab (Patient presents to the E R  with report of abnormal kidney function, having a transplanted kidney having 1month old  multiple myeloma diagnosis  Patient has a generalized rash that started yesterday and states:  "It began after taking a new chemo pill ")     The patient is being admitted with acute kidney injury on stage 3 chronic kidney disease, skin rash, urinalysis and multiple myeloma    The plan of care includes intravenous fluids, hold Lasix, nephrology consultation, ultrasound, intravenous steroids, urine culture, intravenous antibiotics  This patient is appropriate for inpatient admission continued hospitalization is necessary to ensure stabilization of her clinical status      The patients vitals on arrival were ED Triage Vitals [08/14/19 1434]   Temperature Pulse Respirations Blood Pressure SpO2   98 8 °F (37 1 °C) 72 18 137/57 97 %      Temp Source Heart Rate Source Patient Position - Orthostatic VS BP Location FiO2 (%)   Tympanic Monitor Sitting Left arm --      Pain Score       No Pain           Past Medical History:   Diagnosis Date    Abnormal liver function test     Acute kidney injury ([address] )     Acute on chronic congestive heart failure (HCC)     Allergic urticaria     Anemia     Cancer (HCC)     Multiple myeloma    Cervical dysplasia     Cholelithiasis     Chronic diastolic (congestive) heart failure (HCC) 9/18/2017    Diabetes mellitus (HCC)     Previous, controlled with diet    Diabetes mellitus with foot ulcer (HCC)     Disease of thyroid gland     Encephalopathy     Hematuria     + leak est- secondary to UTIs/panc drainage    History of transfusion     Hyperkalemia     Hypertension     Iliotibial band syndrome     Lumbar radiculopathy     Multiple myeloma (HCC)     Multiple myeloma (HCC)     Night blindness     Nonrheumatic aortic (valve) insufficiency     Pneumonia     Renal disorder     Retinopathy     Seborrhea     Seizure ([address] )     Shingles     Sinus tachycardia     B blocker - cardio echo stress test 02 normal/neg LE doppler 2/02 OK and 12/07    Status post simultaneous kidney and pancreas transplant ([address] )     Toe amputation status (HCC)     Trochanteric bursitis      Past Surgical History:   Procedure Laterality Date    CATARACT EXTRACTION      CHOLECYSTECTOMY      COLONOSCOPY      two polyps in the rectum removed and biopsied diverticulosis in the sigmoid colon, external hemorrhoiods- Dr Barfield    COMBINED KIDNEY-PANCREAS TRANSPLANT N/A     CT BONE MARROW BIOPSY AND ASPIRATION  4/17/2019    CYSTOSCOPY N/A 10/13/2016    Procedure: CYSTOSCOPY, retrograde pyelogram, biopsy of ureteral polyp; Surgeon: Dolores Long MD;  Location: [facility];  Service:    Isaak Eduardo DILATION AND CURETTAGE OF UTERUS      ESOPHAGOGASTRODUODENOSCOPY N/A 11/20/2017    Procedure: ESOPHAGOGASTRODUODENOSCOPY (EGD); Surgeon: Kamran Mascorro DO;  Location: [facility];   Service: Gastroenterology    EYE SURGERY      cataracts    FOOT AMPUTATION THROUGH METATARSAL Left     FOOT SURGERY Right     excision of metatarsal heads    HALLUX VALGUS CORRECTION Right     NEPHRECTOMY TRANSPLANTED ORGAN      PANCREATIC TRANSPLANT REMOVAL  1998           Consults have been placed to:   IP CONSULT TO NEPHROLOGY    Vitals:    08/15/19 1438 08/15/19 1446 08/15/19 1447 08/15/19 1448   BP:   169/63    Pulse: 80 81 81 79   Resp:       Temp:       TempSrc:       SpO2: 95% 97% 97% 98%   Weight:       Height:           Most recent labs:    Recent Labs     08/15/19  0530   WBC 8 40   HGB 9 0*   HCT 29 2*      K 4 1   CALCIUM 8 3   BUN 52*   CREATININE 2 36*   AST 10   ALT 17   ALKPHOS 85       Scheduled Meds:  Current Facility-Administered Medications:  acetaminophen 650 mg Oral Q6H PRN Zachariah Blotter, DO    amLODIPine 10 mg Oral QAM Zachariah Blotter, DO    ARIPiprazole 30 mg Oral HS Zachariah Blotter, DO    aspirin 81 mg Oral Daily Zachariah Blotter, DO    busPIRone 5 mg Oral BID Zachariah Blotter, DO    cefTRIAXone 1,000 mg Intravenous Q24H Zachariah Blotter, DO Last Rate: 1,000 mg (08/14/19 2034)   cholecalciferol 1,000 Units Oral Daily Zachariah Blotter, DO    cloNIDine 0 3 mg Oral [facility] Zachariah Blotter, DO    diphenhydrAMINE 25 mg Oral Q6H PRN Zachariah Blotter, DO    doxazosin 2 mg Oral HS Zachariah Blotter, DO    DULoxetine 60 mg Oral BID Zachariah Blotter, DO    folic acid 8,893 mcg Oral Daily Zachariah Blotter, DO    heparin (porcine) 5,000 Units Subcutaneous Q8H [address] Quent[place] , DO    hydrALAZINE 10 mg Intravenous Q6H PRN Gilberto Aguirre MD    hydrALAZINE 50 mg Oral [facility] [place] , DO    insulin glargine 10 Units Subcutaneous HS Qu[place] , DO    insulin lispro 1-5 Units Subcutaneous TID AC Quenten , DO    insulin lispro 1-5 Units Subcutaneous HS [place] , DO    levothyroxine 125 mcg Oral Early Morning [place] , DO    LORazepam 2 mg Oral TID PRN [place] , DO    methylPREDNISolone sodium succinate 40 mg Intravenous Q12H [address] [place] , DO    metoprolol tartrate 50 mg Oral Q12H [address] [place] , DO    ondansetron 4 mg Intravenous Q6H PRN [place] , DO    pravastatin 80 mg Oral QPM [place] , DO    rOPINIRole 0 25 mg Oral BID [place] , DO    sertraline 200 mg Oral Daily [place] , DO    sevelamer 800 mg Oral TID With Meals Quenten , DO    sodium bicarbonate 1,300 mg Oral TID Quenten , DO    sodium chloride 100 mL/hr Intravenous Continuous Quenten , DO Last Rate: 100 mL/hr (08/15/19 1607)   tacrolimus 2 mg Oral QPM Quenten , DO    tacrolimus 3 mg Oral Daily Quenten , DO      Continuous Infusions:  sodium chloride 100 mL/hr Last Rate: 100 mL/hr (08/15/19 1607)     PRN Meds:   acetaminophen    diphenhydrAMINE    hydrALAZINE    LORazepam    ondansetron    Surgical procedures (if appropriate):

## 2019-08-16 ENCOUNTER — TELEPHONE (OUTPATIENT)
Dept: NEPHROLOGY | Facility: CLINIC | Age: 54
End: 2019-08-16

## 2019-08-16 LAB
ANION GAP SERPL CALCULATED.3IONS-SCNC: 11 MMOL/L (ref 4–13)
BACTERIA UR CULT: ABNORMAL
BASOPHILS # BLD AUTO: 0.01 THOUSANDS/ΜL (ref 0–0.1)
BASOPHILS NFR BLD AUTO: 0 % (ref 0–1)
BKV DNA # UR NAA+PROBE: NEGATIVE COPIES/ML
BKV DNA SPEC NAA+PROBE-LOG#: NORMAL LOG10COPY/ML
BUN SERPL-MCNC: 48 MG/DL (ref 5–25)
CALCIUM SERPL-MCNC: 8.1 MG/DL (ref 8.3–10.1)
CHLORIDE SERPL-SCNC: 113 MMOL/L (ref 100–108)
CO2 SERPL-SCNC: 20 MMOL/L (ref 21–32)
CREAT SERPL-MCNC: 2.44 MG/DL (ref 0.6–1.3)
EOSINOPHIL # BLD AUTO: 0.07 THOUSAND/ΜL (ref 0–0.61)
EOSINOPHIL NFR BLD AUTO: 1 % (ref 0–6)
ERYTHROCYTE [DISTWIDTH] IN BLOOD BY AUTOMATED COUNT: 14.7 % (ref 11.6–15.1)
GFR SERPL CREATININE-BSD FRML MDRD: 22 ML/MIN/1.73SQ M
GLUCOSE SERPL-MCNC: 120 MG/DL (ref 65–140)
GLUCOSE SERPL-MCNC: 139 MG/DL (ref 65–140)
GLUCOSE SERPL-MCNC: 141 MG/DL (ref 65–140)
GLUCOSE SERPL-MCNC: 145 MG/DL (ref 65–140)
GLUCOSE SERPL-MCNC: 155 MG/DL (ref 65–140)
HCT VFR BLD AUTO: 29.5 % (ref 34.8–46.1)
HGB BLD-MCNC: 9.1 G/DL (ref 11.5–15.4)
IMM GRANULOCYTES # BLD AUTO: 0.05 THOUSAND/UL (ref 0–0.2)
IMM GRANULOCYTES NFR BLD AUTO: 1 % (ref 0–2)
LYMPHOCYTES # BLD AUTO: 0.69 THOUSANDS/ΜL (ref 0.6–4.47)
LYMPHOCYTES NFR BLD AUTO: 8 % (ref 14–44)
MCH RBC QN AUTO: 30.7 PG (ref 26.8–34.3)
MCHC RBC AUTO-ENTMCNC: 30.8 G/DL (ref 31.4–37.4)
MCV RBC AUTO: 100 FL (ref 82–98)
MONOCYTES # BLD AUTO: 0.1 THOUSAND/ΜL (ref 0.17–1.22)
MONOCYTES NFR BLD AUTO: 1 % (ref 4–12)
NEUTROPHILS # BLD AUTO: 7.69 THOUSANDS/ΜL (ref 1.85–7.62)
NEUTS SEG NFR BLD AUTO: 89 % (ref 43–75)
NRBC BLD AUTO-RTO: 0 /100 WBCS
PLATELET # BLD AUTO: 193 THOUSANDS/UL (ref 149–390)
PMV BLD AUTO: 12.8 FL (ref 8.9–12.7)
POTASSIUM SERPL-SCNC: 3.9 MMOL/L (ref 3.5–5.3)
RBC # BLD AUTO: 2.96 MILLION/UL (ref 3.81–5.12)
SODIUM SERPL-SCNC: 144 MMOL/L (ref 136–145)
WBC # BLD AUTO: 8.61 THOUSAND/UL (ref 4.31–10.16)

## 2019-08-16 PROCEDURE — 99232 SBSQ HOSP IP/OBS MODERATE 35: CPT | Performed by: INTERNAL MEDICINE

## 2019-08-16 PROCEDURE — 82948 REAGENT STRIP/BLOOD GLUCOSE: CPT

## 2019-08-16 PROCEDURE — 85025 COMPLETE CBC W/AUTO DIFF WBC: CPT | Performed by: INTERNAL MEDICINE

## 2019-08-16 PROCEDURE — 80048 BASIC METABOLIC PNL TOTAL CA: CPT | Performed by: INTERNAL MEDICINE

## 2019-08-16 RX ORDER — HYDRALAZINE HYDROCHLORIDE 25 MG/1
75 TABLET, FILM COATED ORAL EVERY 8 HOURS SCHEDULED
Status: DISCONTINUED | OUTPATIENT
Start: 2019-08-16 | End: 2019-08-17 | Stop reason: HOSPADM

## 2019-08-16 RX ORDER — NITROFURANTOIN 25; 75 MG/1; MG/1
100 CAPSULE ORAL 2 TIMES DAILY WITH MEALS
Status: DISCONTINUED | OUTPATIENT
Start: 2019-08-16 | End: 2019-08-16

## 2019-08-16 RX ORDER — CIPROFLOXACIN 500 MG/1
500 TABLET, FILM COATED ORAL EVERY 12 HOURS SCHEDULED
Status: DISCONTINUED | OUTPATIENT
Start: 2019-08-16 | End: 2019-08-17 | Stop reason: HOSPADM

## 2019-08-16 RX ADMIN — HEPARIN SODIUM 5000 UNITS: 5000 INJECTION INTRAVENOUS; SUBCUTANEOUS at 07:25

## 2019-08-16 RX ADMIN — HYDRALAZINE HYDROCHLORIDE 50 MG: 25 TABLET ORAL at 07:22

## 2019-08-16 RX ADMIN — DULOXETINE HYDROCHLORIDE 60 MG: 60 CAPSULE, DELAYED RELEASE ORAL at 08:44

## 2019-08-16 RX ADMIN — PRAVASTATIN SODIUM 80 MG: 80 TABLET ORAL at 17:47

## 2019-08-16 RX ADMIN — SEVELAMER HYDROCHLORIDE 800 MG: 800 TABLET, FILM COATED PARENTERAL at 17:48

## 2019-08-16 RX ADMIN — HEPARIN SODIUM 5000 UNITS: 5000 INJECTION INTRAVENOUS; SUBCUTANEOUS at 23:18

## 2019-08-16 RX ADMIN — SODIUM CHLORIDE 100 ML/HR: 0.9 INJECTION, SOLUTION INTRAVENOUS at 03:01

## 2019-08-16 RX ADMIN — SODIUM BICARBONATE 650 MG TABLET 1300 MG: at 23:21

## 2019-08-16 RX ADMIN — CIPROFLOXACIN HYDROCHLORIDE 500 MG: 500 TABLET, FILM COATED ORAL at 23:20

## 2019-08-16 RX ADMIN — METHYLPREDNISOLONE SODIUM SUCCINATE 40 MG: 40 INJECTION, POWDER, FOR SOLUTION INTRAMUSCULAR; INTRAVENOUS at 23:37

## 2019-08-16 RX ADMIN — AMLODIPINE BESYLATE 10 MG: 10 TABLET ORAL at 08:44

## 2019-08-16 RX ADMIN — TACROLIMUS 2 MG: 1 CAPSULE ORAL at 17:47

## 2019-08-16 RX ADMIN — FOLIC ACID 1000 MCG: 1 TABLET ORAL at 08:43

## 2019-08-16 RX ADMIN — SEVELAMER HYDROCHLORIDE 800 MG: 800 TABLET, FILM COATED PARENTERAL at 08:43

## 2019-08-16 RX ADMIN — SODIUM BICARBONATE 650 MG TABLET 1300 MG: at 17:47

## 2019-08-16 RX ADMIN — METOPROLOL TARTRATE 50 MG: 50 TABLET, FILM COATED ORAL at 08:44

## 2019-08-16 RX ADMIN — SODIUM CHLORIDE 100 ML/HR: 0.9 INJECTION, SOLUTION INTRAVENOUS at 23:40

## 2019-08-16 RX ADMIN — SERTRALINE HYDROCHLORIDE 200 MG: 50 TABLET ORAL at 08:43

## 2019-08-16 RX ADMIN — ASPIRIN 81 MG 81 MG: 81 TABLET ORAL at 08:44

## 2019-08-16 RX ADMIN — ROPINIROLE HYDROCHLORIDE 0.25 MG: 0.25 TABLET, FILM COATED ORAL at 17:47

## 2019-08-16 RX ADMIN — BUSPIRONE HYDROCHLORIDE 5 MG: 5 TABLET ORAL at 08:44

## 2019-08-16 RX ADMIN — TACROLIMUS 3 MG: 1 CAPSULE ORAL at 08:43

## 2019-08-16 RX ADMIN — DULOXETINE HYDROCHLORIDE 60 MG: 60 CAPSULE, DELAYED RELEASE ORAL at 17:48

## 2019-08-16 RX ADMIN — CLONIDINE HYDROCHLORIDE 0.3 MG: 0.1 TABLET ORAL at 13:30

## 2019-08-16 RX ADMIN — LEVOTHYROXINE SODIUM 125 MCG: 125 TABLET ORAL at 07:25

## 2019-08-16 RX ADMIN — BUSPIRONE HYDROCHLORIDE 5 MG: 5 TABLET ORAL at 17:48

## 2019-08-16 RX ADMIN — DOXAZOSIN 2 MG: 2 TABLET ORAL at 23:57

## 2019-08-16 RX ADMIN — LORAZEPAM 2 MG: 1 TABLET ORAL at 23:27

## 2019-08-16 RX ADMIN — HYDRALAZINE HYDROCHLORIDE 50 MG: 25 TABLET ORAL at 13:30

## 2019-08-16 RX ADMIN — METHYLPREDNISOLONE SODIUM SUCCINATE 40 MG: 40 INJECTION, POWDER, FOR SOLUTION INTRAMUSCULAR; INTRAVENOUS at 08:43

## 2019-08-16 RX ADMIN — CLONIDINE HYDROCHLORIDE 0.3 MG: 0.1 TABLET ORAL at 23:19

## 2019-08-16 RX ADMIN — CIPROFLOXACIN HYDROCHLORIDE 500 MG: 500 TABLET, FILM COATED ORAL at 11:42

## 2019-08-16 RX ADMIN — VITAMIN D, TAB 1000IU (100/BT) 1000 UNITS: 25 TAB at 08:43

## 2019-08-16 RX ADMIN — METOPROLOL TARTRATE 50 MG: 50 TABLET, FILM COATED ORAL at 23:21

## 2019-08-16 RX ADMIN — SODIUM CHLORIDE 100 ML/HR: 0.9 INJECTION, SOLUTION INTRAVENOUS at 13:33

## 2019-08-16 RX ADMIN — ARIPIPRAZOLE 30 MG: 15 TABLET ORAL at 23:19

## 2019-08-16 RX ADMIN — CLONIDINE HYDROCHLORIDE 0.3 MG: 0.1 TABLET ORAL at 07:23

## 2019-08-16 RX ADMIN — INSULIN GLARGINE 10 UNITS: 100 INJECTION, SOLUTION SUBCUTANEOUS at 23:34

## 2019-08-16 RX ADMIN — SEVELAMER HYDROCHLORIDE 800 MG: 800 TABLET, FILM COATED PARENTERAL at 11:42

## 2019-08-16 RX ADMIN — HEPARIN SODIUM 5000 UNITS: 5000 INJECTION INTRAVENOUS; SUBCUTANEOUS at 13:30

## 2019-08-16 RX ADMIN — HYDRALAZINE HYDROCHLORIDE 75 MG: 25 TABLET ORAL at 23:20

## 2019-08-16 RX ADMIN — SODIUM BICARBONATE 650 MG TABLET 1300 MG: at 08:43

## 2019-08-16 NOTE — NURSING NOTE
PT called us into room  Stated that rash is now on her b/l upper arms  Benadryl given    Will let [facility] know

## 2019-08-16 NOTE — ASSESSMENT & PLAN NOTE
Worsening creatinine with Hematuria and pyuria  Lasix dose was increased 1 week ago from 20 mg to 60 mg daily  Started on  new chemotherapy Lenalidomide  6 days ago  Decreased oral intake noted by patient  Hold Lasix  Start IV fluid for hydration  Hold lenalidomide for now  Consult Nephrology for further management  Follow on renal ultrasound    8/15-ultrasound completed on transplant kidney, concerning for ATN versus possible rejection  Prograf levels are within normal limits, however this medication is a common cause of ATN  Bk virus labs and progress  EDUARDO improved overnight, serum creatinine now 2 36 down from 2 63  BUN creatinine ratio consistent with prerenal etiology  Will continue gentle IV hydration at this time and monitor  Nephrology consulted and appreciate their recommendations  8/16-patient continued on IV fluids overnight, have transitioned to Cipro from Rocephin for treatment of E coli UTI  Nephrology following, recommend continued IVFs, newly elevated creatinine may represent new baseline

## 2019-08-16 NOTE — PROGRESS NOTES
NEPHROLOGY PROGRESS NOTE    Mauricio Luna 53 y o  female MRN: [id number]  Unit/Bed#: [facility] 914-01 Encounter: [id number]  Reason for Consult:  Acute on chronic kidney disease in renal transplant    The patient has history of renal transplant and relatively recently diagnosed myeloma for which she is on chemotherapy  Overnight she says she really did not sleep well and now reports that she has some a little bit of a rash on her arms and it is relatively unchanged on her upper thigh  Other than that she is in good spirits  ASSESSMENT/PLAN:  1  Renal  Patient has history of renal transplant in 1998 it appears from the outpatient her baseline creatinine could range 1 9-2 2 and she was admitted to the hospital and her creatinine was elevated above her baseline  Overnight with IV fluids creatinine is stable at 2 4 and she appears euvolemic  Her immunosuppression consists of tacrolimus and recently had a therapeutic trough level 4 and prednisone 5 mg a day  She is receiving IV Solu-Medrol for her rash so the prednisone is on hold but this needs to be resumed once the Solu-Medrol is discontinued  Imaging did not reveal hydronephrosis in the transplant kidney  The question is whether not this is her baseline creatinine or there is some other acute event  She was diagnosed with myeloma that potentially can cause interstitial disease and also with this allergic reaction potentially could she have interstitial nephritis  The urinalysis was abnormal the patient is also having urinary tract infection so this could complicate the microscopic appearance  She also has history of pancreas transplant  Continue with IV fluids and supportive care  On steroids for drug reaction and if there was any element of interstitial nephritis and hopefully would help that as well  BMP a m  Continue tacrolimus and resume prednisone as above    2   UTI  Patient was confirmed to have an E coli that is susceptible to antibiotics on a urine culture  She is feeling little bit better primary team is managing and will likely eventually changed to oral antibiotic  She is presently on ceftriaxone  3  Rash  The patient received Rituxan and developed rash on her upper thigh now has some on the legs  She is currently on IV glucocorticoids  4  History of renal and pancreas transplant  SUBJECTIVE:  Review of Systems   Constitution: Negative for chills and fever  HENT: Negative  Eyes: Negative  Cardiovascular: Negative  Negative for chest pain, dyspnea on exertion, leg swelling and orthopnea  Respiratory: Negative for cough, hemoptysis, shortness of breath and wheezing  Skin: Positive for rash  Rash on arm that itches and upper thigh that is not pruritic  Gastrointestinal: Negative for bloating, abdominal pain, diarrhea, nausea and vomiting  Genitourinary: Negative for dysuria, hematuria and incomplete emptying  Neurological: Negative  Psychiatric/Behavioral: Negative for altered mental status  OBJECTIVE:  Current Weight: Weight - Scale: 102 kg (224 lb 13 9 oz)  Vitals:Temp (24hrs), Av 1 °F (36 7 °C), Min:97 9 °F (36 6 °C), Max:98 4 °F (36 9 °C)  Current: Temperature: 98 1 °F (36 7 °C)   Blood pressure 169/64, pulse 70, temperature 98 1 °F (36 7 °C), resp  rate 20, height 5' 8" (1 727 m), weight 102 kg (224 lb 13 9 oz), SpO2 96 %  , Body mass index is 34 19 kg/m²  Intake/Output Summary (Last 24 hours) at 2019 0941  Last data filed at 2019 0918  Gross per 24 hour   Intake 3836 66 ml   Output 2975 ml   Net 861 66 ml       Physical Exam: /64   Pulse 70   Temp 98 1 °F (36 7 °C)   Resp 20   Ht 5' 8" (1 727 m)   Wt 102 kg (224 lb 13 9 oz)   LMP  (LMP Unknown)   SpO2 96%   BMI 34 19 kg/m²   Physical Exam   Constitutional: She is oriented to person, place, and time  She appears well-nourished  No distress  Neck: Neck supple  No JVD present     Cardiovascular: Normal rate and regular rhythm  Exam reveals no friction rub  Pulmonary/Chest: Effort normal and breath sounds normal  No respiratory distress  She has no wheezes  She has no rales  Abdominal: Soft  Bowel sounds are normal  She exhibits no distension  There is no tenderness  Nontender renal allograft  Neurological: She is alert and oriented to person, place, and time  Skin: Rash noted  Rashes upper thigh and is macular  Psychiatric: She has a normal mood and affect         Medications:    Current Facility-Administered Medications:     acetaminophen (TYLENOL) tablet 650 mg, 650 mg, Oral, Q6H PRN, Alvin Martinez DO    amLODIPine (NORVASC) tablet 10 mg, 10 mg, Oral, QAM, Alvin Martinez DO, 10 mg at 08/16/19 0844    ARIPiprazole (ABILIFY) tablet 30 mg, 30 mg, Oral, HS, Alvin Martinez DO, 30 mg at 08/15/19 2234    aspirin chewable tablet 81 mg, 81 mg, Oral, Daily, Alvin Martinez DO, 81 mg at 08/16/19 0844    busPIRone (BUSPAR) tablet 5 mg, 5 mg, Oral, BID, Alvin Martinez DO, 5 mg at 08/16/19 0844    ceftriaxone (ROCEPHIN) 1 g/50 mL in dextrose IVPB, 1,000 mg, Intravenous, Q24H, Alvin Martinez DO, Last Rate: 100 mL/hr at 08/15/19 2027, 1,000 mg at 08/15/19 2027    cholecalciferol (VITAMIN D3) tablet 1,000 Units, 1,000 Units, Oral, Daily, Alvin Martinez DO, 1,000 Units at 08/16/19 0843    cloNIDine (CATAPRES) tablet 0 3 mg, 0 3 mg, Oral, Q8H [address], Alvin Martinez DO, 0 3 mg at 08/16/19 0723    diphenhydrAMINE (BENADRYL) tablet 25 mg, 25 mg, Oral, Q6H PRN, Alvin Martinez DO, 25 mg at 08/15/19 2328    doxazosin (CARDURA) tablet 2 mg, 2 mg, Oral, HS, Alvin Martinez DO, 2 mg at 08/15/19 2234    DULoxetine (CYMBALTA) delayed release capsule 60 mg, 60 mg, Oral, BID, Alvin Martinez DO, 60 mg at 49/69/80 1608    folic acid (FOLVITE) tablet 1,000 mcg, 1,000 mcg, Oral, Daily, Alvin Martinez DO, 1,000 mcg at 08/16/19 0843    heparin (porcine) subcutaneous injection 5,000 Units, 5,000 Units, Subcutaneous, Q8H [address], 5,000 Units at 08/16/19 0725 **AND** Platelet count, , , Once, Celine Pee, DO    hydrALAZINE (APRESOLINE) injection 10 mg, 10 mg, Intravenous, Q6H PRN, Vernon Ahmadi MD    hydrALAZINE (APRESOLINE) tablet 50 mg, 50 mg, Oral, Q8H [address], Celine Pee, DO, 50 mg at 08/16/19 0722    insulin glargine (LANTUS) subcutaneous injection 10 Units 0 1 mL, 10 Units, Subcutaneous, HS, Celine Pee, DO, 10 Units at 08/15/19 2235    insulin lispro (HumaLOG) 100 units/mL subcutaneous injection 1-5 Units, 1-5 Units, Subcutaneous, TID AC, 1 Units at 08/15/19 1728 **AND** Fingerstick Glucose (POCT), , , TID AC, Celine Pee, DO    insulin lispro (HumaLOG) 100 units/mL subcutaneous injection 1-5 Units, 1-5 Units, Subcutaneous, HS, Celine Pee, DO, 1 Units at 08/14/19 2250    levothyroxine tablet 125 mcg, 125 mcg, Oral, Early Morning, Celine Pee, DO, 125 mcg at 08/16/19 0725    LORazepam (ATIVAN) tablet 2 mg, 2 mg, Oral, TID PRN, Celine Pee, DO    methylPREDNISolone sodium succinate (Solu-MEDROL) injection 40 mg, 40 mg, Intravenous, Q12H [address], Celine Pee, DO, 40 mg at 08/16/19 0843    metoprolol tartrate (LOPRESSOR) tablet 50 mg, 50 mg, Oral, Q12H [address], Celine Pee, DO, 50 mg at 08/16/19 0844    ondansetron (ZOFRAN) injection 4 mg, 4 mg, Intravenous, Q6H PRN, Celine Pee, DO    pravastatin (PRAVACHOL) tablet 80 mg, 80 mg, Oral, QPM, Celine Pee, DO, 80 mg at 08/15/19 1728    rOPINIRole (REQUIP) tablet 0 25 mg, 0 25 mg, Oral, BID, Celine Pee, DO, Stopped at 08/16/19 0844    sertraline (ZOLOFT) tablet 200 mg, 200 mg, Oral, Daily, Celine Pee, DO, 200 mg at 08/16/19 0843    sevelamer (RENAGEL) tablet 800 mg, 800 mg, Oral, TID With Meals, Celine Pee, DO, 800 mg at 08/16/19 0843    sodium bicarbonate tablet 1,300 mg, 1,300 mg, Oral, TID, Celine Pee, DO, 1,300 mg at 08/16/19 0843    sodium chloride 0 9 % infusion, 100 mL/hr, Intravenous, Continuous, Celine Arango DO, Last Rate: 100 mL/hr at 08/16/19 0757, 100 mL/hr at 08/16/19 0757   tacrolimus (PROGRAF) capsule 2 mg, 2 mg, Oral, QPM, Leigha Barer, DO, 2 mg at 08/15/19 1728    tacrolimus (PROGRAF) capsule 3 mg, 3 mg, Oral, Daily, Leigha Barer, DO, 3 mg at 08/16/19 0843    Laboratory Results:  Lab Results   Component Value Date    WBC 8 61 08/16/2019    HGB 9 1 (L) 08/16/2019    HCT 29 5 (L) 08/16/2019     (H) 08/16/2019     08/16/2019     Lab Results   Component Value Date    SODIUM 144 08/16/2019    K 3 9 08/16/2019     (H) 08/16/2019    CO2 20 (L) 08/16/2019    BUN 48 (H) 08/16/2019    CREATININE 2 44 (H) 08/16/2019    GLUC 155 (H) 08/16/2019    CALCIUM 8 1 (L) 08/16/2019     Lab Results   Component Value Date    CALCIUM 8 1 (L) 08/16/2019    PHOS 3 8 08/15/2019     No results found for: LABPROT

## 2019-08-16 NOTE — ASSESSMENT & PLAN NOTE
Patient reported urinary frequency over the past couple days associated with decreasing oral intake  No dysuria  No fever or chills  Rule out UTI  Start IV antibiotic empirically  Follow on urine cultures    8/15-patient reports dysuria, empiric antibiotics started yesterday  Will follow urine cultures and adjust antibiotic therapy as needed  Vital signs stable, white count within normal limits      8/16-patient states that symptoms of dysuria have resolved, transition from ceftriaxone to Cipro for completion of UTI treatment

## 2019-08-16 NOTE — ASSESSMENT & PLAN NOTE
Hold lenalidomide for now  Monitor calcium level  Outpatient Hematology follow-up    8/14-discussed patient briefly with Oncology, stated Revlimid is common cause of rash, we will continue to hold medication at this time  Will defer to outpatient oncology for alternative medications or dose reduction

## 2019-08-16 NOTE — RESTORATIVE TECHNICIAN NOTE
Restorative Specialist Mobility Note       Activity: Ambulate in barron, Ambulate in room, Bathroom privileges, Chair, Dangle, Stand at bedside(Educated/encouraged pt to ambulate with assistance 3-4 x's/day   Pt callbell, phone/tray within reach )     Assistive Device: Front wheel walker          Felipe STONE, Restorative Technician, [employer]

## 2019-08-16 NOTE — ASSESSMENT & PLAN NOTE
Monitor blood pressure  Continue Lopressor, hydralazine, Cardura, clonidine and amlodipine  8/16-patient's blood pressure continues to be elevated, consider increasing hydralazine  P r n  Hydralazine for systolic blood pressure greater than 180

## 2019-08-16 NOTE — PLAN OF CARE
Problem: Nutrition/Hydration-ADULT  Goal: Nutrient/Hydration intake appropriate for improving, restoring or maintaining nutritional needs  Description  Monitor and assess patient's nutrition/hydration status for malnutrition  Collaborate with interdisciplinary team and initiate plan and interventions as ordered  Monitor patient's weight and dietary intake as ordered or per policy  Utilize nutrition screening tool and intervene as necessary  Determine patient's food preferences and provide high-protein, high-caloric foods as appropriate  INTERVENTIONS:  - Monitor oral intake, urinary output, labs, and treatment plans  - Assess nutrition and hydration status and recommend course of action  - Evaluate amount of meals eaten  - Assist patient with eating if necessary   - Allow adequate time for meals  - Recommend/ encourage appropriate diets, oral nutritional supplements, and vitamin/mineral supplements  - Order, calculate, and assess calorie counts as needed  - Recommend, monitor, and adjust tube feedings and TPN/PPN based on assessed needs  - Assess need for intravenous fluids  - Provide specific nutrition/hydration education as appropriate  - Include patient/family/caregiver in decisions related to nutrition  Outcome: Progressing     Problem: Potential for Falls  Goal: Patient will remain free of falls  Description  INTERVENTIONS:  - Assess patient frequently for physical needs  -  Identify cognitive and physical deficits and behaviors that affect risk of falls    -  [place] fall precautions as indicated by assessment   - Educate patient/family on patient safety including physical limitations  - Instruct patient to call for assistance with activity based on assessment  - Modify environment to reduce risk of injury  - Consider OT/PT consult to assist with strengthening/mobility  Outcome: Progressing     Problem: GENITOURINARY - ADULT  Goal: Maintains or returns to baseline urinary function  Description  INTERVENTIONS:  - Assess urinary function  - Encourage oral fluids to ensure adequate hydration if ordered  - Administer IV fluids as ordered to ensure adequate hydration  - Administer ordered medications as needed  - Offer frequent toileting  - Follow urinary retention protocol if ordered  Outcome: Progressing     Problem: METABOLIC, FLUID AND ELECTROLYTES - ADULT  Goal: Electrolytes maintained within normal limits  Description  INTERVENTIONS:  - Monitor labs and assess patient for signs and symptoms of electrolyte imbalances  - Administer electrolyte replacement as ordered  - Monitor response to electrolyte replacements, including repeat lab results as appropriate  - Instruct patient on fluid and nutrition as appropriate  Outcome: Progressing

## 2019-08-16 NOTE — ASSESSMENT & PLAN NOTE
Pleuritic Maculopapular rash mostly on bilateral thigh and groin area  Started yesterday  Rule out secondary to drug rash  Hold lenalidomide  Change prednisone to IV Solu-Medrol  Continue supportive care    8/15-patient reports rash improved dramatically overnight, she attributes this to the IV Solu-Medrol and Benadryl  Revlimid stopped and will defer to Primary Oncology as outpatient to find alternative treatments or dose reduction  Discussed with Oncology here who states that rash following Revlimid administration is quite common  Monitoring continue present therapy  Will observe overnight and consider Dermatology consultation in the morning if not showing continued improvement  8/16-patient with continued Revlimid cessation and IV Solu-Medrol overnight, however rash continues to spread  States not as puritic given treatment with Benadryl, but affected areas now larger  Will discuss with Dermatology

## 2019-08-17 VITALS
WEIGHT: 224.87 LBS | DIASTOLIC BLOOD PRESSURE: 69 MMHG | TEMPERATURE: 98.2 F | RESPIRATION RATE: 18 BRPM | OXYGEN SATURATION: 96 % | BODY MASS INDEX: 34.08 KG/M2 | SYSTOLIC BLOOD PRESSURE: 177 MMHG | HEART RATE: 74 BPM | HEIGHT: 68 IN

## 2019-08-17 PROBLEM — R82.90 ABNORMAL URINALYSIS: Status: RESOLVED | Noted: 2019-08-14 | Resolved: 2019-08-17

## 2019-08-17 LAB
ANION GAP SERPL CALCULATED.3IONS-SCNC: 9 MMOL/L (ref 4–13)
BASOPHILS # BLD AUTO: 0.03 THOUSANDS/ΜL (ref 0–0.1)
BASOPHILS NFR BLD AUTO: 0 % (ref 0–1)
BUN SERPL-MCNC: 45 MG/DL (ref 5–25)
CALCIUM SERPL-MCNC: 8.2 MG/DL (ref 8.3–10.1)
CHLORIDE SERPL-SCNC: 115 MMOL/L (ref 100–108)
CO2 SERPL-SCNC: 20 MMOL/L (ref 21–32)
CREAT SERPL-MCNC: 2.13 MG/DL (ref 0.6–1.3)
EOSINOPHIL # BLD AUTO: 0.14 THOUSAND/ΜL (ref 0–0.61)
EOSINOPHIL NFR BLD AUTO: 1 % (ref 0–6)
ERYTHROCYTE [DISTWIDTH] IN BLOOD BY AUTOMATED COUNT: 14.9 % (ref 11.6–15.1)
GFR SERPL CREATININE-BSD FRML MDRD: 25 ML/MIN/1.73SQ M
GLUCOSE SERPL-MCNC: 110 MG/DL (ref 65–140)
GLUCOSE SERPL-MCNC: 140 MG/DL (ref 65–140)
GLUCOSE SERPL-MCNC: 141 MG/DL (ref 65–140)
GLUCOSE SERPL-MCNC: 179 MG/DL (ref 65–140)
HCT VFR BLD AUTO: 31.5 % (ref 34.8–46.1)
HGB BLD-MCNC: 9.4 G/DL (ref 11.5–15.4)
IMM GRANULOCYTES # BLD AUTO: 0.13 THOUSAND/UL (ref 0–0.2)
IMM GRANULOCYTES NFR BLD AUTO: 1 % (ref 0–2)
LYMPHOCYTES # BLD AUTO: 0.67 THOUSANDS/ΜL (ref 0.6–4.47)
LYMPHOCYTES NFR BLD AUTO: 6 % (ref 14–44)
MCH RBC QN AUTO: 29.9 PG (ref 26.8–34.3)
MCHC RBC AUTO-ENTMCNC: 29.8 G/DL (ref 31.4–37.4)
MCV RBC AUTO: 100 FL (ref 82–98)
MONOCYTES # BLD AUTO: 0.11 THOUSAND/ΜL (ref 0.17–1.22)
MONOCYTES NFR BLD AUTO: 1 % (ref 4–12)
NEUTROPHILS # BLD AUTO: 9.47 THOUSANDS/ΜL (ref 1.85–7.62)
NEUTS SEG NFR BLD AUTO: 91 % (ref 43–75)
NRBC BLD AUTO-RTO: 0 /100 WBCS
PLATELET # BLD AUTO: 200 THOUSANDS/UL (ref 149–390)
PMV BLD AUTO: 13.3 FL (ref 8.9–12.7)
POTASSIUM SERPL-SCNC: 3.9 MMOL/L (ref 3.5–5.3)
RBC # BLD AUTO: 3.14 MILLION/UL (ref 3.81–5.12)
SODIUM SERPL-SCNC: 144 MMOL/L (ref 136–145)
WBC # BLD AUTO: 10.55 THOUSAND/UL (ref 4.31–10.16)

## 2019-08-17 PROCEDURE — 99238 HOSP IP/OBS DSCHRG MGMT 30/<: CPT | Performed by: INTERNAL MEDICINE

## 2019-08-17 PROCEDURE — 82948 REAGENT STRIP/BLOOD GLUCOSE: CPT

## 2019-08-17 PROCEDURE — 99232 SBSQ HOSP IP/OBS MODERATE 35: CPT | Performed by: INTERNAL MEDICINE

## 2019-08-17 PROCEDURE — 80048 BASIC METABOLIC PNL TOTAL CA: CPT | Performed by: INTERNAL MEDICINE

## 2019-08-17 PROCEDURE — 85025 COMPLETE CBC W/AUTO DIFF WBC: CPT | Performed by: INTERNAL MEDICINE

## 2019-08-17 RX ORDER — XYLITOL/YERBA SANTA
5 AEROSOL, SPRAY WITH PUMP (ML) MUCOUS MEMBRANE AS NEEDED
Status: DISCONTINUED | OUTPATIENT
Start: 2019-08-17 | End: 2019-08-17 | Stop reason: HOSPADM

## 2019-08-17 RX ORDER — SENNOSIDES 8.6 MG
2 TABLET ORAL DAILY PRN
Status: DISCONTINUED | OUTPATIENT
Start: 2019-08-17 | End: 2019-08-17 | Stop reason: HOSPADM

## 2019-08-17 RX ORDER — CIPROFLOXACIN 500 MG/1
500 TABLET, FILM COATED ORAL EVERY 12 HOURS SCHEDULED
Qty: 6 TABLET | Refills: 0 | Status: SHIPPED | OUTPATIENT
Start: 2019-08-17 | End: 2019-08-20

## 2019-08-17 RX ORDER — DOCUSATE SODIUM 100 MG/1
100 CAPSULE, LIQUID FILLED ORAL 2 TIMES DAILY
Status: DISCONTINUED | OUTPATIENT
Start: 2019-08-17 | End: 2019-08-17 | Stop reason: HOSPADM

## 2019-08-17 RX ADMIN — HEPARIN SODIUM 5000 UNITS: 5000 INJECTION INTRAVENOUS; SUBCUTANEOUS at 14:39

## 2019-08-17 RX ADMIN — FOLIC ACID 1000 MCG: 1 TABLET ORAL at 09:07

## 2019-08-17 RX ADMIN — SEVELAMER HYDROCHLORIDE 800 MG: 800 TABLET, FILM COATED PARENTERAL at 09:07

## 2019-08-17 RX ADMIN — CLONIDINE HYDROCHLORIDE 0.3 MG: 0.1 TABLET ORAL at 14:38

## 2019-08-17 RX ADMIN — HYDRALAZINE HYDROCHLORIDE 75 MG: 25 TABLET ORAL at 14:38

## 2019-08-17 RX ADMIN — TACROLIMUS 3 MG: 1 CAPSULE ORAL at 09:06

## 2019-08-17 RX ADMIN — HEPARIN SODIUM 5000 UNITS: 5000 INJECTION INTRAVENOUS; SUBCUTANEOUS at 06:07

## 2019-08-17 RX ADMIN — DOCUSATE SODIUM 100 MG: 100 CAPSULE, LIQUID FILLED ORAL at 09:07

## 2019-08-17 RX ADMIN — SERTRALINE HYDROCHLORIDE 200 MG: 50 TABLET ORAL at 09:07

## 2019-08-17 RX ADMIN — METOPROLOL TARTRATE 50 MG: 50 TABLET, FILM COATED ORAL at 09:07

## 2019-08-17 RX ADMIN — METHYLPREDNISOLONE SODIUM SUCCINATE 40 MG: 40 INJECTION, POWDER, FOR SOLUTION INTRAMUSCULAR; INTRAVENOUS at 09:08

## 2019-08-17 RX ADMIN — SODIUM BICARBONATE 650 MG TABLET 1300 MG: at 09:06

## 2019-08-17 RX ADMIN — ASPIRIN 81 MG 81 MG: 81 TABLET ORAL at 09:07

## 2019-08-17 RX ADMIN — CIPROFLOXACIN HYDROCHLORIDE 500 MG: 500 TABLET, FILM COATED ORAL at 09:08

## 2019-08-17 RX ADMIN — BUSPIRONE HYDROCHLORIDE 5 MG: 5 TABLET ORAL at 09:07

## 2019-08-17 RX ADMIN — ROPINIROLE HYDROCHLORIDE 0.25 MG: 0.25 TABLET, FILM COATED ORAL at 09:06

## 2019-08-17 RX ADMIN — AMLODIPINE BESYLATE 10 MG: 10 TABLET ORAL at 09:07

## 2019-08-17 RX ADMIN — HYDRALAZINE HYDROCHLORIDE 75 MG: 25 TABLET ORAL at 06:07

## 2019-08-17 RX ADMIN — SEVELAMER HYDROCHLORIDE 800 MG: 800 TABLET, FILM COATED PARENTERAL at 12:37

## 2019-08-17 RX ADMIN — LEVOTHYROXINE SODIUM 125 MCG: 125 TABLET ORAL at 06:07

## 2019-08-17 RX ADMIN — CLONIDINE HYDROCHLORIDE 0.3 MG: 0.1 TABLET ORAL at 06:08

## 2019-08-17 RX ADMIN — DULOXETINE HYDROCHLORIDE 60 MG: 60 CAPSULE, DELAYED RELEASE ORAL at 09:07

## 2019-08-17 RX ADMIN — VITAMIN D, TAB 1000IU (100/BT) 1000 UNITS: 25 TAB at 09:07

## 2019-08-17 NOTE — ASSESSMENT & PLAN NOTE
Worsening creatinine with Hematuria and pyuria  Lasix dose was increased 1 week ago from 20 mg to 60 mg daily  Started on  new chemotherapy Lenalidomide  6 days ago  Decreased oral intake noted by patient  Hold Lasix  Start IV fluid for hydration  Hold lenalidomide for now  Consult Nephrology for further management  Follow on renal ultrasound    8/15-ultrasound completed on transplant kidney, concerning for ATN versus possible rejection  Prograf levels are within normal limits, however this medication is a common cause of ATN  Bk virus labs and progress  EDUARDO improved overnight, serum creatinine now 2 36 down from 2 63  BUN creatinine ratio consistent with prerenal etiology  Will continue gentle IV hydration at this time and monitor  Nephrology consulted and appreciate their recommendations  8/16-patient continued on IV fluids overnight, have transitioned to Cipro from Rocephin for treatment of E coli UTI  Nephrology following, recommend continued IVFs, newly elevated creatinine may represent new baseline  8/17-serum creatinine now at baseline levels on morning labs, 2 13  EDUARDO resolved, will discuss with Nephrology this morning, however patient likely stable for discharge  States she has appointment with Nephrology on Monday

## 2019-08-17 NOTE — PROGRESS NOTES
NEPHROLOGY PROGRESS NOTE   Richelle Shirley 53 y o  female MRN: [id number]  Unit/Bed#: [facility] 914-01 Encounter: [id number]      ASSESSMENT & PLAN:  1  Acute kidney injury (POA) on top of CKD with baseline creatinine around 1 6-2  Follow-up with Dr Jonelle Long as an outpatient  Renal function improved  Okay from renal standpoint of view to be discharged, patient had already a follow-up appointment set up with her nephrologist for this coming      2  Status post renal and pancreas transplant in , failed pancreas transplant in   Continue with immunosuppression:  Prednisone 5 mg daily and tacrolimus  Resume prednisone after discharge  3  Skin rash, suspected secondary to drug rash due to recent chemotherapy  Rash improving with Solu-Medrol and stopping agent  4  E coli UTI, continue with antibiotics  5  Multiple myeloma, continue follow-up with Hematology, her 2nd chemotherapy was stopped due to skin rash  She continue with Hematology Oncology follow-up as an outpatient  Plan and recommendations were reviewed with Dr Beau Collier from Internal Medicine team    SUBJECTIVE:  Patient seen and examined, denies any chest pain or shortness of breath, no nausea, no vomiting, feels her skin rash is improving      OBJECTIVE:  Current Weight: Weight - Scale: 102 kg (224 lb 13 9 oz)  Vitals:    19 0605   BP: (!) 172/68   Pulse: 71   Resp: 20   Temp: 98 4 °F (36 9 °C)   SpO2: 98%       Intake/Output Summary (Last 24 hours) at 2019 1320  Last data filed at 2019 1255  Gross per 24 hour   Intake 3135 ml   Output 1900 ml   Net 1235 ml     General: conscious, cooperative, in not acute distress  Eyes: conjunctivae pale, anicteric sclerae  ENT: lips and mucous membranes moist  Neck: supple, no JVD  Chest: clear breath sounds bilateral, no crackles, ronchus or wheezings  CVS: distinct S1 & S2, normal rate, regular rhythm  Abdomen: soft, non-tender, non-distended, normoactive bowel sounds, allograft area nontender  Extremities: no edema of both legs  Skin:  Positive rash our tight area and lower back  Neuro: awake, alert, oriented        Medications:    Current Facility-Administered Medications:     acetaminophen (TYLENOL) tablet 650 mg, 650 mg, Oral, Q6H PRN, Anice Mines, DO    amLODIPine (NORVASC) tablet 10 mg, 10 mg, Oral, QAM, Anice Mines, DO, 10 mg at 08/17/19 0907    ARIPiprazole (ABILIFY) tablet 30 mg, 30 mg, Oral, HS, Anice Mines, DO, 30 mg at 08/16/19 2319    aspirin chewable tablet 81 mg, 81 mg, Oral, Daily, Anice Mines, DO, 81 mg at 08/17/19 0907    busPIRone (BUSPAR) tablet 5 mg, 5 mg, Oral, BID, Anice Mines, DO, 5 mg at 08/17/19 0907    cholecalciferol (VITAMIN D3) tablet 1,000 Units, 1,000 Units, Oral, Daily, Anice Mines, DO, 1,000 Units at 08/17/19 0907    ciprofloxacin (CIPRO) tablet 500 mg, 500 mg, Oral, Q12H [address], Huma Rendon MD, 500 mg at 08/17/19 0908    cloNIDine (CATAPRES) tablet 0 3 mg, 0 3 mg, Oral, Q8H [address], Anice Mines, DO, 0 3 mg at 08/17/19 0608    diphenhydrAMINE (BENADRYL) tablet 25 mg, 25 mg, Oral, Q6H PRN, Anice Mines, DO, 25 mg at 08/15/19 2328    docusate sodium (COLACE) capsule 100 mg, 100 mg, Oral, BID, Kourtney Ortiz PA-C, 100 mg at 08/17/19 0907    doxazosin (CARDURA) tablet 2 mg, 2 mg, Oral, HS, Anice Mines, DO, 2 mg at 08/16/19 0167    DULoxetine (CYMBALTA) delayed release capsule 60 mg, 60 mg, Oral, BID, Anice Mines, DO, 60 mg at 12/85/59 6691    folic acid (FOLVITE) tablet 1,000 mcg, 1,000 mcg, Oral, Daily, Anice Mines, DO, 1,000 mcg at 08/17/19 0907    heparin (porcine) subcutaneous injection 5,000 Units, 5,000 Units, Subcutaneous, Q8H [address], 5,000 Units at 08/17/19 0607 **AND** Platelet count, , , Once, Anice Mines, DO    hydrALAZINE (APRESOLINE) injection 10 mg, 10 mg, Intravenous, Q6H PRN, Huma Rendon MD    hydrALAZINE (APRESOLINE) tablet 75 mg, 75 mg, Oral, Q8H [address], Huma Rendon MD, 75 mg at 08/17/19 0607    insulin glargine (LANTUS) subcutaneous injection 10 Units 0 1 mL, 10 Units, Subcutaneous, HS, Amina Roman DO, 10 Units at 08/16/19 2334    insulin lispro (HumaLOG) 100 units/mL subcutaneous injection 1-5 Units, 1-5 Units, Subcutaneous, TID AC, 1 Units at 08/15/19 1728 **AND** Fingerstick Glucose (POCT), , , TID AC, Amina Roman DO    insulin lispro (HumaLOG) 100 units/mL subcutaneous injection 1-5 Units, 1-5 Units, Subcutaneous, HS, Amina Roman DO, 1 Units at 08/14/19 2250    levothyroxine tablet 125 mcg, 125 mcg, Oral, Early Morning, Amina Roman DO, 125 mcg at 08/17/19 0607    LORazepam (ATIVAN) tablet 2 mg, 2 mg, Oral, TID PRN, Amina Roman DO, 2 mg at 08/16/19 2327    methylPREDNISolone sodium succinate (Solu-MEDROL) injection 40 mg, 40 mg, Intravenous, Q12H [address], Amina Roman DO, 40 mg at 08/17/19 0908    metoprolol tartrate (LOPRESSOR) tablet 50 mg, 50 mg, Oral, Q12H [address], Amina Roman DO, 50 mg at 08/17/19 0907    ondansetron (ZOFRAN) injection 4 mg, 4 mg, Intravenous, Q6H PRN, Amina Roman DO    pravastatin (PRAVACHOL) tablet 80 mg, 80 mg, Oral, QPM, Amina Roman DO, 80 mg at 08/16/19 1747    rOPINIRole (REQUIP) tablet 0 25 mg, 0 25 mg, Oral, BID, Amina Roman DO, 0 25 mg at 08/17/19 0906    saliva substitute (MOUTH KOTE) mucosal solution 5 spray, 5 spray, Mouth/Throat, PRN, Kuortney Ortiz PA-C    senna (SENOKOT) tablet 17 2 mg, 2 tablet, Oral, Daily PRN, Kourtney Ortiz PA-C    sertraline (ZOLOFT) tablet 200 mg, 200 mg, Oral, Daily, Amina Roman DO, 200 mg at 08/17/19 0907    sevelamer (RENAGEL) tablet 800 mg, 800 mg, Oral, TID With Meals, Amina Roman DO, 800 mg at 08/17/19 1237    sodium bicarbonate tablet 1,300 mg, 1,300 mg, Oral, TID, Amina Roman DO, 1,300 mg at 08/17/19 0906    sodium chloride 0 9 % infusion, 100 mL/hr, Intravenous, Continuous, Amina Roman DO, Last Rate: 100 mL/hr at 08/16/19 2340, 100 mL/hr at 08/16/19 2340    tacrolimus (PROGRAF) capsule 2 mg, 2 mg, Oral, QPM, [place] Krishnamurthy Chris, DO, 2 mg at 08/16/19 1747    tacrolimus (PROGRAF) capsule 3 mg, 3 mg, Oral, Daily, Laveda Ratel, DO, 3 mg at 08/17/19 0906    Invasive Devices:        Lab Results:   Results from last 7 days   Lab Units 08/17/19  0603 08/16/19  0441 08/15/19  0530  08/14/19  0649   WBC Thousand/uL 10 55* 8 61 8 40   < > 7 62   HEMOGLOBIN g/dL 9 4* 9 1* 9 0*   < > 9 4*   HEMATOCRIT % 31 5* 29 5* 29 2*   < > 30 1*   PLATELETS Thousands/uL 200 193 191   < > 206   SODIUM mmol/L 144 144 141   < > 137   POTASSIUM mmol/L 3 9 3 9 4 1   < > 3 6   CHLORIDE mmol/L 115* 113* 111*   < > 105   CO2 mmol/L 20* 20* 22   < > 22   BUN mg/dL 45* 48* 52*   < > 51*   CREATININE mg/dL 2 13* 2 44* 2 36*   < > 2 63*   CALCIUM mg/dL 8 2* 8 1* 8 3   < > 8 8   MAGNESIUM mg/dL  --   --  2 0  --  2 1   PHOSPHORUS mg/dL  --   --  3 8  --  4 1   ALK PHOS U/L  --   --  85  --  94   ALT U/L  --   --  17  --  18   AST U/L  --   --  10  --  9    < > = values in this interval not displayed  Previous work up:  Urine culture from 8/14:  E coli  Portions of the record may have been created with voice recognition software  Occasional wrong word or "sound a like" substitutions may have occurred due to the inherent limitations of voice recognition software  Read the chart carefully and recognize, using context, where substitutions have occurred  If you have any questions, please contact the dictating provider

## 2019-08-17 NOTE — ASSESSMENT & PLAN NOTE
Pleuritic Maculopapular rash mostly on bilateral thigh and groin area  Started yesterday  Rule out secondary to drug rash  Hold lenalidomide  Change prednisone to IV Solu-Medrol  Continue supportive care    8/15-patient reports rash improved dramatically overnight, she attributes this to the IV Solu-Medrol and Benadryl  Revlimid stopped and will defer to Primary Oncology as outpatient to find alternative treatments or dose reduction  Discussed with Oncology here who states that rash following Revlimid administration is quite common  Monitoring continue present therapy  Will observe overnight and consider Dermatology consultation in the morning if not showing continued improvement  8/16-patient with continued Revlimid cessation and IV Solu-Medrol overnight, however rash continues to spread  States not as puritic given treatment with Benadryl, but affected areas now larger  Will discuss with Dermatology  8/17-discussed rash with Dermatology who, after looking at the images and epic, believe the rash is consistent with drug reaction  Offending drug stopped, overnight rash improved, less erythematous and she no longer requires Benadryl for pruritus

## 2019-08-17 NOTE — SOCIAL WORK
CM informed pt may need transportation home  CM met with pt  Pt reports her father is going to pick her up at 5

## 2019-08-17 NOTE — ASSESSMENT & PLAN NOTE
Hold lenalidomide for now  Monitor calcium level  Outpatient Hematology follow-up    8/14-discussed patient briefly with Oncology, stated Revlimid is common cause of rash, we will continue to hold medication at this time  Will defer to outpatient oncology for alternative medications or dose reduction  8/17-discussed with patient importance of follow-up with her primary oncologist, patient voiced understanding    Stable for discharge today, however will need resumption of chemotherapy for multiple myeloma outpatient

## 2019-08-18 DIAGNOSIS — N18.4 CKD (CHRONIC KIDNEY DISEASE), STAGE IV (HCC): ICD-10-CM

## 2019-08-18 DIAGNOSIS — G25.81 RLS (RESTLESS LEGS SYNDROME): ICD-10-CM

## 2019-08-18 RX ORDER — ROPINIROLE 0.25 MG/1
TABLET, FILM COATED ORAL
Qty: 180 TABLET | Refills: 0 | Status: SHIPPED | OUTPATIENT
Start: 2019-08-18 | End: 2019-11-16 | Stop reason: SDUPTHER

## 2019-08-19 ENCOUNTER — TRANSITIONAL CARE MANAGEMENT (OUTPATIENT)
Dept: FAMILY MEDICINE CLINIC | Facility: CLINIC | Age: 54
End: 2019-08-19

## 2019-08-19 ENCOUNTER — TELEPHONE (OUTPATIENT)
Dept: FAMILY MEDICINE CLINIC | Facility: CLINIC | Age: 54
End: 2019-08-19

## 2019-08-19 ENCOUNTER — OFFICE VISIT (OUTPATIENT)
Dept: NEPHROLOGY | Facility: CLINIC | Age: 54
End: 2019-08-19
Payer: MEDICARE

## 2019-08-19 VITALS
HEIGHT: 68 IN | HEART RATE: 61 BPM | BODY MASS INDEX: 35.46 KG/M2 | SYSTOLIC BLOOD PRESSURE: 176 MMHG | DIASTOLIC BLOOD PRESSURE: 74 MMHG | WEIGHT: 234 LBS

## 2019-08-19 DIAGNOSIS — Z94.0 RENAL TRANSPLANT RECIPIENT: Primary | ICD-10-CM

## 2019-08-19 DIAGNOSIS — D84.9 IMMUNOSUPPRESSION (HCC): ICD-10-CM

## 2019-08-19 PROCEDURE — 99215 OFFICE O/P EST HI 40 MIN: CPT | Performed by: INTERNAL MEDICINE

## 2019-08-19 RX ORDER — DOXAZOSIN MESYLATE 1 MG/1
TABLET ORAL
Qty: 180 TABLET | Refills: 3 | Status: SHIPPED | OUTPATIENT
Start: 2019-08-19 | End: 2019-09-13 | Stop reason: HOSPADM

## 2019-08-19 NOTE — DISCHARGE SUMMARY
Progress Note - Katelynn Palma 1965, 53 y o  female MRN: [id number]    [facility] En[facility]: [id number]    Primary Care Provider: Brenda Matta MD   Date and time admitted to hospital: 8/14/2019  2:40 PM        * Acute renal failure superimposed on stage 3 chronic kidney disease ([facility] [address] )  Assessment & Plan  Worsening creatinine with Hematuria and pyuria  Lasix dose was increased 1 week ago from 20 mg to 60 mg daily  Started on  new chemotherapy Lenalidomide  6 days ago  Decreased oral intake noted by patient  Hold Lasix  Start IV fluid for hydration  Hold lenalidomide for now  Consult Nephrology for further management  Follow on renal ultrasound    8/15-ultrasound completed on transplant kidney, concerning for ATN versus possible rejection  Prograf levels are within normal limits, however this medication is a common cause of ATN  Bk virus labs and progress  EDUARDO improved overnight, serum creatinine now 2 36 down from 2 63  BUN creatinine ratio consistent with prerenal etiology  Will continue gentle IV hydration at this time and monitor  Nephrology consulted and appreciate their recommendations  8/16-patient continued on IV fluids overnight, have transitioned to Cipro from Rocephin for treatment of E coli UTI  Nephrology following, recommend continued IVFs, newly elevated creatinine may represent new baseline  8/17-serum creatinine now at baseline levels on morning labs, 2 13  EDUARDO resolved, will discuss with Nephrology this morning, however patient likely stable for discharge  States she has appointment with Nephrology on Monday  Skin rash  Assessment & Plan  Pleuritic Maculopapular rash mostly on bilateral thigh and groin area  Started yesterday  Rule out secondary to drug rash  Hold lenalidomide  Change prednisone to IV Solu-Medrol  Continue supportive care    8/15-patient reports rash improved dramatically overnight, she attributes this to the IV Solu-Medrol and Benadryl  Revlimid stopped and will defer to Primary Oncology as outpatient to find alternative treatments or dose reduction  Discussed with Oncology here who states that rash following Revlimid administration is quite common  Monitoring continue present therapy  Will observe overnight and consider Dermatology consultation in the morning if not showing continued improvement  8/16-patient with continued Revlimid cessation and IV Solu-Medrol overnight, however rash continues to spread  States not as puritic given treatment with Benadryl, but affected areas now larger  Will discuss with Dermatology  8/17-discussed rash with Dermatology who, after looking at the images and epic, believe the rash is consistent with drug reaction  Offending drug stopped, overnight rash improved, less erythematous and she no longer requires Benadryl for pruritus  Major depressive disorder, recurrent episode, in full remission [facility])  Assessment & Plan  Continue home meds  Patient denies acute issues, denies SI or HI    Renal transplant recipient  Assessment & Plan  Patient is status post kidney and pancreatic transplant in 1998  Continue Prograf and steroids  Nephrology consulted for further management, appreciate their recommendations    Multiple myeloma not having achieved remission [facility])  Assessment & Plan  Hold lenalidomide for now  Monitor calcium level  Outpatient Hematology follow-up    8/14-discussed patient briefly with Oncology, stated Revlimid is common cause of rash, we will continue to hold medication at this time  Will defer to outpatient oncology for alternative medications or dose reduction  8/17-discussed with patient importance of follow-up with her primary oncologist, patient voiced understanding    Stable for discharge today, however will need resumption of chemotherapy for multiple myeloma outpatient    Essential hypertension  Assessment & Plan  Monitor blood pressure  Continue Lopressor, hydralazine, Cardura, clonidine and amlodipine  8/16-patient's blood pressure continues to be elevated, consider increasing hydralazine  P r n  Hydralazine for systolic blood pressure greater than 180  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 ([facility] [address] )  Assessment & Plan  - pt with last A1C = 5 1    - home meds include   - Insulin - Long-acting, toujeo 1 U qhs       - Short-acting 10 U preprandial    - hold home oral medications while inpatient  - start Low-CSI  - BG check qc/qhs with goal -180          Discharging Physician / Practitioner: Danilo Caputo MD  PCP: Pebbles Oconnor MD  Admission Date:   Admission Orders (From admission, onward)     Ordered        08/14/19 1901  Inpatient Admission  Once                   Discharge Date: 08/17/19    Resolved Problems  Date Reviewed: 8/15/2019          Resolved    Abnormal urinalysis 8/17/2019     Resolved by  Danilo Caputo MD    Acute kidney injury [facility]) 8/17/2019     Resolved by  Danilo Caputo MD          Consultations During Hospital Stay:  · Nephrology; dermatology    Procedures Performed:   · None    Significant Findings / Test Results:   · Acute kidney injury    Incidental Findings:   · None     Test Results Pending at Discharge (will require follow up): · None     Outpatient Tests Requested:  · None    Complications:  None    Reason for Admission:  Acute kidney injury    Hospital Course: Jocelyn Villafuerte is a 53 y o  female patient who originally presented to the hospital on 8/14/2019 due to findings of acute kidney injury on outpatient labs  Please see above list of diagnoses and related plan for additional information  Condition at Discharge: stable     Discharge Day Visit / Exam:     * Please refer to separate progress note for these details *    Discussion with Family: Discussed treatment plan with family and patient who agree with current plan; encouraged to ask questions and participate        Discharge instructions/Information to patient and family:   See after visit summary for information provided to patient and family  Provisions for Follow-Up Care:  See after visit summary for information related to follow-up care and any pertinent home health orders  Disposition:     Home    For Discharges to [facility]:   · Not Applicable to this Patient - Not Applicable to this Patient    Planned Readmission: no     Discharge Statement:  I spent 30 minutes discharging the patient  This time was spent on the day of discharge  I had direct contact with the patient on the day of discharge  Greater than 50% of the total time was spent examining patient, answering all patient questions, arranging and discussing plan of care with patient as well as directly providing post-discharge instructions  Additional time then spent on discharge activities  Discharge Medications:  See after visit summary for reconciled discharge medications provided to patient and family        ** Please Note: This note has been constructed using a voice recognition system **

## 2019-08-19 NOTE — LETTER
August 19, 2019     Jessica Vick, [facility] [address] [facility]    Patient: Yao Howell   YOB: 1965   Date of Visit: 8/19/2019       Dear Dr Yao Sebastian:    Thank you for referring Yao Howell to me for evaluation  Below are my notes for this consultation  If you have questions, please do not hesitate to call me  I look forward to following your patient along with you  Sincerely,        Julianna Horowitz MD        CC: No Recipients  Julianna Horowitz MD  8/19/2019  9:37 AM  Sign at close encounter  [address] [facility] 53 y o  female MRN: [id number]  8/19/2019    Reason for Visit: CKD IV, renal transplant    ASSESSMENT and PLAN:    I had the pleasure of seeing Ms Vessie Meigs today in the renal clinic for the continued management of CKD IV, renal transplant  Since our last visit, there has been no ER visits or hospitalizations  He currently has no complaints at this time and is feeling well  Patient denies any chest pain, shortness of breath and swelling  The last blood work was done on august 2019, which we have reviewed together  46 yo female with PMHx of renal pancreas transplant 1998, DM I, failed pancreas transplant 2017, HTN, recurrent UTI/pyelo, recurrent, 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, Did require dialysis in 2014, orthostasis, anemia, hypothyroid, 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, dCHF, aortic regurg, dCHF grade I, zoster in July, subclavian art stenosis on Left presents for follow-up visit for renal transplant     Patient has had multiple admissions and issues recently     9/2017 -acute kidney injury, urinary frequency   Treated initially for UTI      10/2017 -urinary incontinence   Was started on antibiotics for possible UTI   Was started on Bactrim prophylaxis      11/2017 -abdominal pain  Nausea   Confusion   Depression and anxiety   Patient had EGD which showed gastritis   Given volume expansion        2/2018-depression and anxiety   Treated for UTI also        07/2018-treated for urinary tract infection; also was seen in the ER on 07/23 for periorbital cellulitis     9/2018 - renal artery study at [facility] - patent renal artery and anastamosis that did not require intervention     March/april 2019 - saw Hematology  Rising kappa light chain  BM biopsy was recommended       4/16/19 - had foot abscess and treated with antibiotics       4/17/19 - BM biopsy completed  Progressing myeloma cells in BM    8/2019 - patient admitted to the hospital for cystitis, diffuse rash  Felt to be drug rash due to new chemotherapy agent  Cystitis with E coli pansensitive and started on antibiotics     1) Renal transplant - Baseline Cr 1 6 - 1 9 mg/dL  recurrent UTI/pyelo, recurrent episodes of EDUARDO due to volume depletion  BK negative  Has history of renal artery stenosis     - Cr progressively worsening and new baseline may be 1 8-2 1  -acute kidney injury last week in the setting of cystitis and volume depletion  -creatinine improving on 08/17 to 2 1 mg/dL  -protein creatinine ratio rising 2 6 g per g but difficult to interpret currently  - follow renal function closely     - advised to drink 2 L fluids daily  - has renal artery stenosis > 60%-s/p renal artery study without stenosis at [place] with IR  - UA with positive nitrite, innumerable bacteria, pyuria   - UPCR 1 62 --> rising 3 13 in February and then slowly improving to 1 9 gm in may  - Overall, 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   Patient also has paraproteinemia which is likely contributing to proteinuria also     - if renal function worsens, may need repeat transplant eval at [facility], but the issues with myeloma and co morbidities may prevent repeat renal transplant  - pt moving to [facility] likely next year - will assist in setting patient up with physicians locally there when pt moves  -restart furosemide once a day instead of twice a day given weight gain and worsening edema, though today it is trace  -appointment in 2 months  -lab work in 2 weeks     2) Immunosuppression - given Hematological issues, goal tac 4     - tac 3 mg in a m , 2 mg in p m   -goal tacrolimus is 4  - Continue prednisone 5 mg     3) HTN - history of orthostasis and low diast BP  widened pulse pressure possible due to aortic valve regurg? needs afterload reduction     - pt states now BP rarely goes above 170; mainly in 160s over 50s  - BP today appropriate in office (unclear if cuff at home is accurate and therefore patient will bring in to compare next visit)  - amlodipine 10 mg in AM and 5 mg in PM, clonidine 0 3 TID, doxazosin 3 mg nightly, metoprolol 50 BID, hydralazine 50 TID  -renal artery study with stenosis present - saw [facility] team and had IR procedure on 9/25/18 with CO2 study and 5 cc contrast utilized - the transplant artery is slightly tortuous but the artery was widely patent     4) Anemia -      - prior fec occ positive  - had EGD prior with gastritis  - iron sat low  - iron infusions started in June 2018  - prior to [facility] or [facility], will need to review with Hematology given history of paraproteinemia as above  - follow Hb  -message Hematology to discuss     5) recurrent UTI/pyelo - as above; completed monosat       - monitor clinically  - pt's E coli has become resistant over time  - advised patient that patient needs to go to ER if develops urinary symptoms as has resistant bacteria  -patient is on treatment for E coli cystitis  Was at resistant prior but this pansensitive currently     6) Vaccine - should stay up to date on innactivated vaccinations     -hold on taking shingrix vaccine for one year post shingles infection (do not take for now)     7) Lipids - as per PCP with regards to lipid check     8) BMD - DEXA due 2017        - hyperphosphatemia improving with phosphorus binders    -DEXA scan pending     9) proteinuria -likely multifactorial due to advanced age of kidney/allograft nephropathy, paraproteinemia       8) age appropriate ca screening - needs to remain up to date with mammogram an colonoscopy (next c scope is in 8/2018)     - needs to f/u with Derm Yearly - pt will call to make appt  - awaiting repeat EGD with GI     11) depression - Patient states depression is well-controlled with the current regimen      - pt is now off sertraline  - pt is on 4-5 different anti psych/anti depressants  Pt will discuss with Psychiatry team      12) question of renal art stenosis - not evident on study     13) MGUS/IgG Kappa now with myeloma - follows with Hematology/Oncology      -patient may require chemotherapy in the near future  - started on treatment with ixazomib but had a reaction  -patient had reaction to 2nd agent to also   -awaiting Hematology recommendations     14) Fatigue      15) hyponatremia - improved       16) pancreas transplant -      - failed pancreas allograft - following with Endocrine  - last A1c 5  - C peptide 1 7     17) Volume - relatively euvolemic, but has gained weight and trace edema LE     - cont furosemide at once a day  - if weight increases, increase furosemide     18) zoster     -zoster had second episode in December and third episode recently   - now resolved  - acyclorvir     19) aortic regurg - on ECHO July with grade I dCHF     20) subclavian art stenosis on L     21) acid/base - bicarb tabs were increased to three times/day     - continue for now  Bicarb improved     22) hypermag - repeat and hold mag supplement  Magnesium is improved     23) weight gain       It was a pleasure of evaluating Ms Gloria Cooper in the renal office  Please do not hesitate to contact our team if further issues or questions arise in the interim  No problem-specific Assessment & Plan notes found for this encounter  HPI:    Patient feeling weak but is feeling improved compared to last week  Denies fevers, chills, nausea, vomiting      PATIENT INSTRUCTIONS:    Patient Instructions   1) Avoid NSAIDS - (Example - motrin, advil, ibuprofen, aleve, exederin, etc)  2) Always follow a low salt diet  3) Restart furosemide 20 mg once daily tomorrow  4) labwork in 2 weeks  5) appointment in 2 months  6) labwork when you return from [facility] 86:  Current Weight: Weight - Scale: 106 kg (234 lb)  Vitals:    08/19/19 0906   BP: (!) 176/74   BP Location: Left arm   Patient Position: Sitting   Cuff Size: Standard   Pulse: 61   Weight: 106 kg (234 lb)   Height: 5' 8" (1 727 m)    Body mass index is 35 58 kg/m²  REVIEW OF SYSTEMS:    Review of Systems   Constitutional: Positive for fatigue  HENT: Negative  Eyes: Negative  Respiratory: Negative  Negative for shortness of breath  Cardiovascular: Positive for leg swelling  Gastrointestinal: Negative  Endocrine: Negative  Genitourinary: Negative  Negative for difficulty urinating  Musculoskeletal: Negative  Skin: Negative  Allergic/Immunologic: Negative  Neurological: Negative  Hematological: Negative  Psychiatric/Behavioral: Negative  All other systems reviewed and are negative  PHYSICAL EXAM:      Physical Exam   Constitutional: She is oriented to person, place, and time  She appears well-developed and well-nourished  No distress  HENT:   Head: Normocephalic and atraumatic  Mouth/Throat: No oropharyngeal exudate  Eyes: Conjunctivae are normal  Right eye exhibits no discharge  Left eye exhibits no discharge  No scleral icterus  Neck: Normal range of motion  Neck supple  No JVD present  Cardiovascular: Normal rate and regular rhythm  Exam reveals no gallop and no friction rub  No murmur heard  Pulmonary/Chest: Effort normal and breath sounds normal  No respiratory distress  She has no wheezes  She has no rales  Abdominal: Soft  Bowel sounds are normal  She exhibits no distension  There is no tenderness  There is no rebound     Musculoskeletal: Normal range of motion  She exhibits edema  She exhibits no tenderness or deformity  Trace   Neurological: She is alert and oriented to person, place, and time  Coordination normal    Skin: Skin is warm and dry  No rash noted  She is not diaphoretic  No erythema  No pallor  Psychiatric: She has a normal mood and affect  Her behavior is normal  Judgment and thought content normal    Nursing note and vitals reviewed        Medications:    Current Outpatient Medications:     amLODIPine (NORVASC) 10 mg tablet, TAKE 1 TABLET(10MG) BY MOUTH EVERY MORNING AND 1/2 TABLET(5MG) EVERY EVENING, Disp: 135 tablet, Rfl: 0    ARIPiprazole (ABILIFY) 30 mg tablet, Take 1 tablet (30 mg total) by mouth daily at bedtime for 180 days, Disp: 90 tablet, Rfl: 1    aspirin 81 MG tablet, Take 1 tablet by mouth daily, Disp: , Rfl:     busPIRone (BUSPAR) 5 mg tablet, Take 1 tablet (5 mg total) by mouth 2 (two) times a day for 180 days, Disp: 180 tablet, Rfl: 1    Cholecalciferol (VITAMIN D3) 1000 units CAPS, Take 3 capsules by mouth daily  , Disp: , Rfl:     ciprofloxacin (CIPRO) 500 mg tablet, Take 1 tablet (500 mg total) by mouth every 12 (twelve) hours for 3 days, Disp: 6 tablet, Rfl: 0    cloNIDine (CATAPRES) 0 1 mg tablet, TAKE 3 TABLETS BY MOUTH EVERY MORNING, 3 TABLETS AT NOON, AND 3 TABLETS EVERY EVENING, Disp: 810 tablet, Rfl: 4    doxazosin (CARDURA) 1 mg tablet, Take 2 tablets (2 mg total) by mouth daily at bedtime, Disp: 90 tablet, Rfl: 0    DULoxetine (CYMBALTA) 60 mg delayed release capsule, Take 1 capsule (60 mg total) by mouth 2 (two) times a day for 180 days, Disp: 180 capsule, Rfl: 1    folic acid (FOLVITE) 1 mg tablet, TAKE 1 TABLET BY MOUTH DAILY AS DIRECTED, Disp: 90 tablet, Rfl: 3    furosemide (LASIX) 20 mg tablet, TAKE 1 TABLET BY MOUTH EVERY DAY, Disp: 90 tablet, Rfl: 0    hydrALAZINE (APRESOLINE) 50 mg tablet, TAKE 1 TABLET(50MG) BY MOUTH THREE TIMES DAILY FOR 90 DAYS, Disp: 270 tablet, Rfl: 0   Insulin Glargine (TOUJEO SOLOSTAR) 300 units/mL CONCETRATED U-300 injection pen, Inject 1 Units under the skin daily at bedtime, Disp: 4 pen, Rfl: 0    insulin lispro (HUMALOG KWIKPEN) 100 units/mL injection pen, INJECT 10 UNDER THE SKIN EVERY DAY OR AS DIRECTED (Patient taking differently: INJECT 10 UNDER THE SKIN EVERY DAY OR AS DIRECTED), Disp: 45 mL, Rfl: 1    Insulin Pen Needle (BD PEN NEEDLE VISHNU U/F) 32G X 4 MM MISC, Use 4 times daily, Disp: 400 each, Rfl: 1    Ixazomib Citrate (NINLARO) 4 MG CAPS, Take 4 mg once weekly for 3 weeks on and 1 week off, Disp: 3 capsule, Rfl: 6    Lancets (ONETOUCH ULTRASOFT) lancets, by Does not apply route 4 (four) times a day  , Disp: , Rfl:     levothyroxine 125 mcg tablet, Take 1 tablet (125 mcg total) by mouth daily, Disp: 90 tablet, Rfl: 2    LORazepam (ATIVAN) 2 mg tablet, Take 1 tablet (2 mg total) by mouth 3 (three) times a day as needed for anxiety for up to 120 days To be filled on or after 6/29/19, Disp: 90 tablet, Rfl: 3    methylPREDNISolone 4 MG tablet therapy pack, Use as directed on package, Disp: 1 each, Rfl: 0    metoprolol tartrate (LOPRESSOR) 50 mg tablet, TAKE 1 TABLET(50MG TOTAL) BY MOUTH TWICE DAILY, Disp: 180 tablet, Rfl: 5    ONE TOUCH ULTRA TEST test strip, Use 4 times daily ( please dispense One touch Ultra test strips), Disp: 400 each, Rfl: 1    pravastatin (PRAVACHOL) 80 mg tablet, TAKE 1 TABLET BY MOUTH DAILY, Disp: 90 tablet, Rfl: 5    predniSONE 5 mg tablet, Take 1 tablet (5 mg total) by mouth daily, Disp: 90 tablet, Rfl: 3    rOPINIRole (REQUIP) 0 25 mg tablet, TAKE 1 TABLET BY MOUTH TWICE DAILY, Disp: 180 tablet, Rfl: 0    sertraline (ZOLOFT) 100 mg tablet, Take 2 tablets (200 mg total) by mouth daily for 180 days, Disp: 180 tablet, Rfl: 1    sevelamer carbonate (RENVELA) 800 mg tablet, Take 1 tablet (800 mg total) by mouth 3 (three) times a day with meals, Disp: 270 tablet, Rfl: 3    sodium bicarbonate 650 mg tablet, TAKE 2 TABLETS BY MOUTH THREE TIMES DAILY, Disp: 180 tablet, Rfl: 0    tacrolimus (PROGRAF) 1 mg capsule, Take 3 mg in AM and 2 mg in PM (6/18/19), Disp: 180 capsule, Rfl: 6    doxazosin (CARDURA) 1 mg tablet, TAKE 2 TABLETS BY MOUTH EVERY NIGHT AT BEDTIME, Disp: 180 tablet, Rfl: 3    Laboratory Results:  Results from last 7 days   Lab Units 08/17/19  0603 08/16/19  0441 08/15/19  0530 08/14/19  1600 08/14/19  0649   WBC Thousand/uL 10 55* 8 61 8 40 7 62 7 62   HEMOGLOBIN g/dL 9 4* 9 1* 9 0* 9 5* 9 4*   HEMATOCRIT % 31 5* 29 5* 29 2* 30 3* 30 1*   PLATELETS Thousands/uL 200 193 191 211 206   POTASSIUM mmol/L 3 9 3 9 4 1 3 5 3 6   CHLORIDE mmol/L 115* 113* 111* 107 105   CO2 mmol/L 20* 20* 22 22 22   BUN mg/dL 45* 48* 52* 55* 51*   CREATININE mg/dL 2 13* 2 44* 2 36* 2 46* 2 63*   CALCIUM mg/dL 8 2* 8 1* 8 3 9 0 8 8   MAGNESIUM mg/dL  --   --  2 0  --  2 1   PHOSPHORUS mg/dL  --   --  3 8  --  4 1       Results for orders placed or performed during the hospital encounter of 08/14/19   Urine culture   Result Value Ref Range    Urine Culture >100,000 cfu/ml Escherichia coli (A)        Susceptibility    Escherichia coli - SARTHAK     ZID Performed Yes       Ampicillin ($$) <=8 00 Susceptible ug/ml     Aztreonam ($$$)  <=4 Susceptible ug/ml     Cefazolin ($) <=2 00 Susceptible ug/ml     Ciprofloxacin ($)  <=1 00 Susceptible ug/ml     Gentamicin ($$) <=1 Susceptible ug/ml     Levofloxacin ($) <=0 25 Susceptible ug/ml     Nitrofurantoin <=32 Susceptible ug/ml     Tetracycline <=4 Susceptible ug/ml     Tobramycin ($) <=1 Susceptible ug/ml     Trimethoprim + Sulfamethoxazole ($$$) <=2/38 Susceptible ug/ml   CBC and differential   Result Value Ref Range    WBC 7 62 4 31 - 10 16 Thousand/uL    RBC 3 09 (L) 3 81 - 5 12 Million/uL    Hemoglobin 9 5 (L) 11 5 - 15 4 g/dL    Hematocrit 30 3 (L) 34 8 - 46 1 %    MCV 98 82 - 98 fL    MCH 30 7 26 8 - 34 3 pg    MCHC 31 4 31 4 - 37 4 g/dL    RDW 14 6 11 6 - 15 1 %    MPV 13 0 (H) 8 9 - 12 7 fL Platelets 246 234 - 751 Thousands/uL    nRBC 0 /100 WBCs    Neutrophils Relative 74 43 - 75 %    Immat GRANS % 1 0 - 2 %    Lymphocytes Relative 15 14 - 44 %    Monocytes Relative 5 4 - 12 %    Eosinophils Relative 5 0 - 6 %    Basophils Relative 0 0 - 1 %    Neutrophils Absolute 5 70 1 85 - 7 62 Thousands/µL    Immature Grans Absolute 0 06 0 00 - 0 20 Thousand/uL    Lymphocytes Absolute 1 11 0 60 - 4 47 Thousands/µL    Monocytes Absolute 0 36 0 17 - 1 22 Thousand/µL    Eosinophils Absolute 0 37 0 00 - 0 61 Thousand/µL    Basophils Absolute 0 02 0 00 - 0 10 Thousands/µL   Tacrolimus level   Result Value Ref Range    TACROLIMUS 4 2 2 0 - 20 0 ng/mL   BK virus, DNA, urine, quantitative   Result Value Ref Range    BK Virus DAN Qaunt PCR Negative Negative copies/mL    LOG10 BK QN PCR UR COMMENT [id number]   Basic metabolic panel   Result Value Ref Range    Sodium 140 136 - 145 mmol/L    Potassium 3 5 3 5 - 5 3 mmol/L    Chloride 107 100 - 108 mmol/L    CO2 22 21 - 32 mmol/L    ANION GAP 11 4 - 13 mmol/L    BUN 55 (H) 5 - 25 mg/dL    Creatinine 2 46 (H) 0 60 - 1 30 mg/dL    Glucose 112 65 - 140 mg/dL    Calcium 9 0 8 3 - 10 1 mg/dL    eGFR 21 ml/min/1 73sq m   UA w Reflex to Microscopic   Result Value Ref Range    Color, UA Yellow     Clarity, UA Cloudy     Specific [place], UA 1 010 1 003 - 1 030    pH, UA 8 0 4 5, 5 0, 5 5, 6 0, 6 5, 7 0, 7 5, 8 0    Leukocytes, UA Large (A) Negative    Nitrite, UA Positive (A) Negative    Protein,  (2+) (A) Negative mg/dl    Glucose, UA Negative Negative mg/dl    Ketones, UA Negative Negative mg/dl    Urobilinogen, UA 0 2 0 2, 1 0 E U /dl E U /dl    Bilirubin, UA Negative Negative    Blood, UA Large (A) Negative   Urine Microscopic   Result Value Ref Range    RBC, UA 10-20 (A) None Seen, 0-5 /hpf    WBC, UA 30-50 (A) None Seen, 0-5, 5-55, 5-65 /hpf    Epithelial Cells Occasional None Seen, Occasional /hpf    Bacteria, UA Innumerable (A) None Seen, Occasional /hpf Comprehensive metabolic panel   Result Value Ref Range    Sodium 141 136 - 145 mmol/L    Potassium 4 1 3 5 - 5 3 mmol/L    Chloride 111 (H) 100 - 108 mmol/L    CO2 22 21 - 32 mmol/L    ANION GAP 8 4 - 13 mmol/L    BUN 52 (H) 5 - 25 mg/dL    Creatinine 2 36 (H) 0 60 - 1 30 mg/dL    Glucose 148 (H) 65 - 140 mg/dL    Calcium 8 3 8 3 - 10 1 mg/dL    AST 10 5 - 45 U/L    ALT 17 12 - 78 U/L    Alkaline Phosphatase 85 46 - 116 U/L    Total Protein 8 6 (H) 6 4 - 8 2 g/dL    Albumin 3 0 (L) 3 5 - 5 0 g/dL    Total Bilirubin 0 33 0 20 - 1 00 mg/dL    eGFR 23 ml/min/1 73sq m   Magnesium   Result Value Ref Range    Magnesium 2 0 1 6 - 2 6 mg/dL   Phosphorus   Result Value Ref Range    Phosphorus 3 8 2 7 - 4 5 mg/dL   CBC and differential   Result Value Ref Range    WBC 8 40 4 31 - 10 16 Thousand/uL    RBC 2 94 (L) 3 81 - 5 12 Million/uL    Hemoglobin 9 0 (L) 11 5 - 15 4 g/dL    Hematocrit 29 2 (L) 34 8 - 46 1 %    MCV 99 (H) 82 - 98 fL    MCH 30 6 26 8 - 34 3 pg    MCHC 30 8 (L) 31 4 - 37 4 g/dL    RDW 14 6 11 6 - 15 1 %    MPV 12 9 (H) 8 9 - 12 7 fL    Platelets 784 966 - 208 Thousands/uL    nRBC 0 /100 WBCs   Basic metabolic panel   Result Value Ref Range    Sodium 144 136 - 145 mmol/L    Potassium 3 9 3 5 - 5 3 mmol/L    Chloride 113 (H) 100 - 108 mmol/L    CO2 20 (L) 21 - 32 mmol/L    ANION GAP 11 4 - 13 mmol/L    BUN 48 (H) 5 - 25 mg/dL    Creatinine 2 44 (H) 0 60 - 1 30 mg/dL    Glucose 155 (H) 65 - 140 mg/dL    Calcium 8 1 (L) 8 3 - 10 1 mg/dL    eGFR 22 ml/min/1 73sq m   CBC and differential   Result Value Ref Range    WBC 8 61 4 31 - 10 16 Thousand/uL    RBC 2 96 (L) 3 81 - 5 12 Million/uL    Hemoglobin 9 1 (L) 11 5 - 15 4 g/dL    Hematocrit 29 5 (L) 34 8 - 46 1 %     (H) 82 - 98 fL    MCH 30 7 26 8 - 34 3 pg    MCHC 30 8 (L) 31 4 - 37 4 g/dL    RDW 14 7 11 6 - 15 1 %    MPV 12 8 (H) 8 9 - 12 7 fL    Platelets 470 672 - 016 Thousands/uL    nRBC 0 /100 WBCs    Neutrophils Relative 89 (H) 43 - 75 %    Immat GRANS % 1 0 - 2 %    Lymphocytes Relative 8 (L) 14 - 44 %    Monocytes Relative 1 (L) 4 - 12 %    Eosinophils Relative 1 0 - 6 %    Basophils Relative 0 0 - 1 %    Neutrophils Absolute 7 69 (H) 1 85 - 7 62 Thousands/µL    Immature Grans Absolute 0 05 0 00 - 0 20 Thousand/uL    Lymphocytes Absolute 0 69 0 60 - 4 47 Thousands/µL    Monocytes Absolute 0 10 (L) 0 17 - 1 22 Thousand/µL    Eosinophils Absolute 0 07 0 00 - 0 61 Thousand/µL    Basophils Absolute 0 01 0 00 - 0 10 Thousands/µL   CBC and differential   Result Value Ref Range    WBC 10 55 (H) 4 31 - 10 16 Thousand/uL    RBC 3 14 (L) 3 81 - 5 12 Million/uL    Hemoglobin 9 4 (L) 11 5 - 15 4 g/dL    Hematocrit 31 5 (L) 34 8 - 46 1 %     (H) 82 - 98 fL    MCH 29 9 26 8 - 34 3 pg    MCHC 29 8 (L) 31 4 - 37 4 g/dL    RDW 14 9 11 6 - 15 1 %    MPV 13 3 (H) 8 9 - 12 7 fL    Platelets 507 908 - 288 Thousands/uL    nRBC 0 /100 WBCs    Neutrophils Relative 91 (H) 43 - 75 %    Immat GRANS % 1 0 - 2 %    Lymphocytes Relative 6 (L) 14 - 44 %    Monocytes Relative 1 (L) 4 - 12 %    Eosinophils Relative 1 0 - 6 %    Basophils Relative 0 0 - 1 %    Neutrophils Absolute 9 47 (H) 1 85 - 7 62 Thousands/µL    Immature Grans Absolute 0 13 0 00 - 0 20 Thousand/uL    Lymphocytes Absolute 0 67 0 60 - 4 47 Thousands/µL    Monocytes Absolute 0 11 (L) 0 17 - 1 22 Thousand/µL    Eosinophils Absolute 0 14 0 00 - 0 61 Thousand/µL    Basophils Absolute 0 03 0 00 - 0 10 Thousands/µL   Basic metabolic panel   Result Value Ref Range    Sodium 144 136 - 145 mmol/L    Potassium 3 9 3 5 - 5 3 mmol/L    Chloride 115 (H) 100 - 108 mmol/L    CO2 20 (L) 21 - 32 mmol/L    ANION GAP 9 4 - 13 mmol/L    BUN 45 (H) 5 - 25 mg/dL    Creatinine 2 13 (H) 0 60 - 1 30 mg/dL    Glucose 141 (H) 65 - 140 mg/dL    Calcium 8 2 (L) 8 3 - 10 1 mg/dL    eGFR 25 ml/min/1 73sq m   Fingerstick Glucose (POCT)   Result Value Ref Range    POC Glucose 123 65 - 140 mg/dl   Fingerstick Glucose (POCT)   Result Value Ref Range    POC Glucose 153 (H) 65 - 140 mg/dl   Fingerstick Glucose (POCT)   Result Value Ref Range    POC Glucose 130 65 - 140 mg/dl   Fingerstick Glucose (POCT)   Result Value Ref Range    POC Glucose 117 65 - 140 mg/dl   Fingerstick Glucose (POCT)   Result Value Ref Range    POC Glucose 164 (H) 65 - 140 mg/dl   Fingerstick Glucose (POCT)   Result Value Ref Range    POC Glucose 98 65 - 140 mg/dl   Fingerstick Glucose (POCT)   Result Value Ref Range    POC Glucose 120 65 - 140 mg/dl   Fingerstick Glucose (POCT)   Result Value Ref Range    POC Glucose 139 65 - 140 mg/dl   Fingerstick Glucose (POCT)   Result Value Ref Range    POC Glucose 141 (H) 65 - 140 mg/dl   Fingerstick Glucose (POCT)   Result Value Ref Range    POC Glucose 145 (H) 65 - 140 mg/dl   Fingerstick Glucose (POCT)   Result Value Ref Range    POC Glucose 140 65 - 140 mg/dl   Fingerstick Glucose (POCT)   Result Value Ref Range    POC Glucose 110 65 - 140 mg/dl   Fingerstick Glucose (POCT)   Result Value Ref Range    POC Glucose 179 (H) 65 - 140 mg/dl   Manual Differential(PHLEBS Do Not Order)   Result Value Ref Range    Segmented % 93 (H) 43 - 75 %    Lymphocytes % 4 (L) 14 - 44 %    Monocytes % 0 (L) 4 - 12 %    Eosinophils, % 0 0 - 6 %    Basophils % 0 0 - 1 %    Metamyelocytes% 1 0 - 1 %    Atypical Lymphocytes % 2 (H) <=0 %    Absolute Neutrophils 7 81 (H) 1 85 - 7 62 Thousand/uL    Lymphocytes Absolute 0 34 (L) 0 60 - 4 47 Thousand/uL    Monocytes Absolute 0 00 0 00 - 1 22 Thousand/uL    Eosinophils Absolute 0 00 0 00 - 0 40 Thousand/uL    Basophils Absolute 0 00 0 00 - 0 10 Thousand/uL    Total Counted      RBC Morphology Present     Polychromasia Present     Platelet Estimate Adequate Adequate    Giant PLTs Present

## 2019-08-19 NOTE — PATIENT INSTRUCTIONS
1) Avoid NSAIDS - (Example - motrin, advil, ibuprofen, aleve, exederin, etc)  2) Always follow a low salt diet  3) Restart furosemide 20 mg once daily tomorrow  4) labwork in 2 weeks  5) appointment in 2 months  6) labwork when you return from [facility]

## 2019-08-19 NOTE — PROGRESS NOTES
NEPHROLOGY OFFICE VISIT   Cesar Delong 53 y o  female MRN: [id number]  8/19/2019    Reason for Visit: CKD IV, renal transplant    ASSESSMENT and PLAN:    I had the pleasure of seeing Ms Iman Gandhi today in the renal clinic for the continued management of CKD IV, renal transplant  Since our last visit, there has been no ER visits or hospitalizations  He currently has no complaints at this time and is feeling well  Patient denies any chest pain, shortness of breath and swelling  The last blood work was done on august 2019, which we have reviewed together  46 yo female with PMHx of renal pancreas transplant 1998, DM I, failed pancreas transplant 2017, HTN, recurrent UTI/pyelo, recurrent, 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, Did require dialysis in 2014, orthostasis, anemia, hypothyroid, 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, dCHF, aortic regurg, dCHF grade I, zoster in July, subclavian art stenosis on Left presents for follow-up visit for renal transplant     Patient has had multiple admissions and issues recently     9/2017 -acute kidney injury, urinary frequency   Treated initially for UTI      10/2017 -urinary incontinence   Was started on antibiotics for possible UTI   Was started on Bactrim prophylaxis      11/2017 -abdominal pain  Nausea   Confusion   Depression and anxiety   Patient had EGD which showed gastritis   Given volume expansion        2/2018-depression and anxiety   Treated for UTI also        07/2018-treated for urinary tract infection; also was seen in the ER on 07/23 for periorbital cellulitis     9/2018 - renal artery study at [facility] - patent renal artery and anastamosis that did not require intervention     March/april 2019 - saw Hematology  Rising kappa light chain  BM biopsy was recommended       4/16/19 - had foot abscess and treated with antibiotics       4/17/19 - BM biopsy completed   Progressing myeloma cells in [facility]    8/2019 - patient admitted to the hospital for cystitis, diffuse rash  Felt to be drug rash due to new chemotherapy agent  Cystitis with E coli pansensitive and started on antibiotics     1) Renal transplant - Baseline Cr 1 6 - 1 9 mg/dL  recurrent UTI/pyelo, recurrent episodes of EDUARDO due to volume depletion  BK negative  Has history of renal artery stenosis     - Cr progressively worsening and new baseline may be 1 8-2 1  -acute kidney injury last week in the setting of cystitis and volume depletion  -creatinine improving on 08/17 to 2 1 mg/dL  -protein creatinine ratio rising 2 6 g per g but difficult to interpret currently  - follow renal function closely     - advised to drink 2 L fluids daily  - has renal artery stenosis > 60%-s/p renal artery study without stenosis at [place] with IR  - UA with positive nitrite, innumerable bacteria, pyuria   - UPCR 1 62 --> rising 3 13 in February and then slowly improving to 1 9 gm in may  - Overall, 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   Patient also has paraproteinemia which is likely contributing to proteinuria also     - if renal function worsens, may need repeat transplant eval at [facility], but the issues with myeloma and co morbidities may prevent repeat renal transplant  - pt moving to [facility] likely next year - will assist in setting patient up with physicians locally there when pt moves  -restart furosemide once a day instead of twice a day given weight gain and worsening edema, though today it is trace  -appointment in 2 months  -lab work in 2 weeks     2) Immunosuppression - given Hematological issues, goal tac 4     - tac 3 mg in a m , 2 mg in p m   -goal tacrolimus is 4  - Continue prednisone 5 mg     3) HTN - history of orthostasis and low diast BP  widened pulse pressure possible due to aortic valve regurg? needs afterload reduction     - pt states now BP rarely goes above 170; mainly in 160s over 50s  - BP today appropriate in office (unclear if cuff at home is accurate and therefore patient will bring in to compare next visit)  - amlodipine 10 mg in AM and 5 mg in PM, clonidine 0 3 TID, doxazosin 3 mg nightly, metoprolol 50 BID, hydralazine 50 TID  -renal artery study with stenosis present - saw [facility] and had IR procedure on 9/25/18 with CO2 study and 5 cc contrast utilized - the transplant artery is slightly tortuous but the artery was widely patent     4) Anemia -      - prior fec occ positive  - had EGD prior with gastritis  - iron sat low  - iron infusions started in June 2018  - prior to [facility] or [facility], will need to review with Hematology given history of paraproteinemia as above  - follow Hb  -message Hematology to discuss     5) recurrent UTI/pyelo - as above; completed monosat       - monitor clinically  - pt's E coli has become resistant over time  - advised patient that patient needs to go to ER if develops urinary symptoms as has resistant bacteria  -patient is on treatment for E coli cystitis  Was at resistant prior but this pansensitive currently     6) Vaccine - should stay up to date on innactivated vaccinations     -hold on taking shingrix vaccine for one year post shingles infection (do not take for now)     7) Lipids - as per PCP with regards to lipid check     8) BMD - DEXA due 2017        - hyperphosphatemia improving with phosphorus binders  -DEXA scan pending     9) proteinuria -likely multifactorial due to advanced age of kidney/allograft nephropathy, paraproteinemia       8) age appropriate ca screening - needs to remain up to date with mammogram an colonoscopy (next c scope is in 8/2018)     - needs to f/u with Derm Yearly - pt will call to make appt  - awaiting repeat EGD with GI     11) depression - Patient states depression is well-controlled with the current regimen      - pt is now off sertraline  - pt is on 4-5 different anti psych/anti depressants   Pt will discuss with Psychiatry team      12) question of renal art stenosis - not evident on study     13) MGUS/IgG Kappa now with myeloma - follows with Hematology/Oncology      -patient may require chemotherapy in the near future  - started on treatment with ixazomib but had a reaction  -patient had reaction to 2nd agent to also   -awaiting Hematology recommendations     14) Fatigue      15) hyponatremia - improved       16) pancreas transplant -      - failed pancreas allograft - following with Endocrine  - last A1c 5  - C peptide 1 7     17) Volume - relatively euvolemic, but has gained weight and trace edema LE     - cont furosemide at once a day  - if weight increases, increase furosemide     18) zoster     -zoster had second episode in December and third episode recently   - now resolved  - acyclorvir     19) aortic regurg - on ECHO July with grade I dCHF     20) subclavian art stenosis on L     21) acid/base - bicarb tabs were increased to three times/day     - continue for now  Bicarb improved     22) hypermag - repeat and hold mag supplement  Magnesium is improved     23) weight gain       It was a pleasure of evaluating Ms Vessie Meigs in the renal office  Please do not hesitate to contact our team if further issues or questions arise in the interim  No problem-specific Assessment & Plan notes found for this encounter  HPI:    Patient feeling weak but is feeling improved compared to last week  Denies fevers, chills, nausea, vomiting      PATIENT INSTRUCTIONS:    Patient Instructions   1) Avoid NSAIDS - (Example - motrin, advil, ibuprofen, aleve, exederin, etc)  2) Always follow a low salt diet  3) Restart furosemide 20 mg once daily tomorrow  4) labwork in 2 weeks  5) appointment in 2 months  6) labwork when you return from [facility]:  Current Weight: Weight - Scale: 106 kg (234 lb)  Vitals:    08/19/19 0906   BP: (!) 176/74   BP Location: Left arm   Patient Position: Sitting   Cuff Size: Standard   Pulse: 61   Weight: 106 kg (234 lb)   Height: 5' 8" (1 727 m)    Body mass index is 35 58 kg/m²  REVIEW OF SYSTEMS:    Review of Systems   Constitutional: Positive for fatigue  HENT: Negative  Eyes: Negative  Respiratory: Negative  Negative for shortness of breath  Cardiovascular: Positive for leg swelling  Gastrointestinal: Negative  Endocrine: Negative  Genitourinary: Negative  Negative for difficulty urinating  Musculoskeletal: Negative  Skin: Negative  Allergic/Immunologic: Negative  Neurological: Negative  Hematological: Negative  Psychiatric/Behavioral: Negative  All other systems reviewed and are negative  PHYSICAL EXAM:      Physical Exam   Constitutional: She is oriented to person, place, and time  She appears well-developed and well-nourished  No distress  HENT:   Head: Normocephalic and atraumatic  Mouth/Throat: No oropharyngeal exudate  Eyes: Conjunctivae are normal  Right eye exhibits no discharge  Left eye exhibits no discharge  No scleral icterus  Neck: Normal range of motion  Neck supple  No JVD present  Cardiovascular: Normal rate and regular rhythm  Exam reveals no gallop and no friction rub  No murmur heard  Pulmonary/Chest: Effort normal and breath sounds normal  No respiratory distress  She has no wheezes  She has no rales  Abdominal: Soft  Bowel sounds are normal  She exhibits no distension  There is no tenderness  There is no rebound  Musculoskeletal: Normal range of motion  She exhibits edema  She exhibits no tenderness or deformity  Trace   Neurological: She is alert and oriented to person, place, and time  Coordination normal    Skin: Skin is warm and dry  No rash noted  She is not diaphoretic  No erythema  No pallor  Psychiatric: She has a normal mood and affect  Her behavior is normal  Judgment and thought content normal    Nursing note and vitals reviewed        Medications:    Current Outpatient Medications:     amLODIPine (NORVASC) 10 mg tablet, TAKE 1 TABLET(10MG) BY MOUTH EVERY MORNING AND 1/2 TABLET(5MG) EVERY EVENING, Disp: 135 tablet, Rfl: 0    ARIPiprazole (ABILIFY) 30 mg tablet, Take 1 tablet (30 mg total) by mouth daily at bedtime for 180 days, Disp: 90 tablet, Rfl: 1    aspirin 81 MG tablet, Take 1 tablet by mouth daily, Disp: , Rfl:     busPIRone (BUSPAR) 5 mg tablet, Take 1 tablet (5 mg total) by mouth 2 (two) times a day for 180 days, Disp: 180 tablet, Rfl: 1    Cholecalciferol (VITAMIN D3) 1000 units CAPS, Take 3 capsules by mouth daily  , Disp: , Rfl:     ciprofloxacin (CIPRO) 500 mg tablet, Take 1 tablet (500 mg total) by mouth every 12 (twelve) hours for 3 days, Disp: 6 tablet, Rfl: 0    cloNIDine (CATAPRES) 0 1 mg tablet, TAKE 3 TABLETS BY MOUTH EVERY MORNING, 3 TABLETS AT NOON, AND 3 TABLETS EVERY EVENING, Disp: 810 tablet, Rfl: 4    doxazosin (CARDURA) 1 mg tablet, Take 2 tablets (2 mg total) by mouth daily at bedtime, Disp: 90 tablet, Rfl: 0    DULoxetine (CYMBALTA) 60 mg delayed release capsule, Take 1 capsule (60 mg total) by mouth 2 (two) times a day for 180 days, Disp: 180 capsule, Rfl: 1    folic acid (FOLVITE) 1 mg tablet, TAKE 1 TABLET BY MOUTH DAILY AS DIRECTED, Disp: 90 tablet, Rfl: 3    furosemide (LASIX) 20 mg tablet, TAKE 1 TABLET BY MOUTH EVERY DAY, Disp: 90 tablet, Rfl: 0    hydrALAZINE (APRESOLINE) 50 mg tablet, TAKE 1 TABLET(50MG) BY MOUTH THREE TIMES DAILY FOR 90 DAYS, Disp: 270 tablet, Rfl: 0    Insulin Glargine (TOUJEO SOLOSTAR) 300 units/mL CONCETRATED U-300 injection pen, Inject 1 Units under the skin daily at bedtime, Disp: 4 pen, Rfl: 0    insulin lispro (HUMALOG KWIKPEN) 100 units/mL injection pen, INJECT 10 UNDER THE SKIN EVERY DAY OR AS DIRECTED (Patient taking differently: INJECT 10 UNDER THE SKIN EVERY DAY OR AS DIRECTED), Disp: 45 mL, Rfl: 1    Insulin Pen Needle (BD PEN NEEDLE VISHNU U/F) 32G X 4 MM MISC, Use 4 times daily, Disp: 400 each, Rfl: 1    Ixazomib Citrate (NINLARO) 4 MG CAPS, Take 4 mg once weekly for 3 weeks on and 1 week off, Disp: 3 capsule, Rfl: 6    Lancets (ONETOUCH ULTRASOFT) lancets, by Does not apply route 4 (four) times a day  , Disp: , Rfl:     levothyroxine 125 mcg tablet, Take 1 tablet (125 mcg total) by mouth daily, Disp: 90 tablet, Rfl: 2    LORazepam (ATIVAN) 2 mg tablet, Take 1 tablet (2 mg total) by mouth 3 (three) times a day as needed for anxiety for up to 120 days To be filled on or after 6/29/19, Disp: 90 tablet, Rfl: 3    methylPREDNISolone 4 MG tablet therapy pack, Use as directed on package, Disp: 1 each, Rfl: 0    metoprolol tartrate (LOPRESSOR) 50 mg tablet, TAKE 1 TABLET(50MG TOTAL) BY MOUTH TWICE DAILY, Disp: 180 tablet, Rfl: 5    ONE TOUCH ULTRA TEST test strip, Use 4 times daily ( please dispense One touch Ultra test strips), Disp: 400 each, Rfl: 1    pravastatin (PRAVACHOL) 80 mg tablet, TAKE 1 TABLET BY MOUTH DAILY, Disp: 90 tablet, Rfl: 5    predniSONE 5 mg tablet, Take 1 tablet (5 mg total) by mouth daily, Disp: 90 tablet, Rfl: 3    rOPINIRole (REQUIP) 0 25 mg tablet, TAKE 1 TABLET BY MOUTH TWICE DAILY, Disp: 180 tablet, Rfl: 0    sertraline (ZOLOFT) 100 mg tablet, Take 2 tablets (200 mg total) by mouth daily for 180 days, Disp: 180 tablet, Rfl: 1    sevelamer carbonate (RENVELA) 800 mg tablet, Take 1 tablet (800 mg total) by mouth 3 (three) times a day with meals, Disp: 270 tablet, Rfl: 3    sodium bicarbonate 650 mg tablet, TAKE 2 TABLETS BY MOUTH THREE TIMES DAILY, Disp: 180 tablet, Rfl: 0    tacrolimus (PROGRAF) 1 mg capsule, Take 3 mg in AM and 2 mg in PM (6/18/19), Disp: 180 capsule, Rfl: 6    doxazosin (CARDURA) 1 mg tablet, TAKE 2 TABLETS BY MOUTH EVERY NIGHT AT BEDTIME, Disp: 180 tablet, Rfl: 3    Laboratory Results:  Results from last 7 days   Lab Units 08/17/19  0603 08/16/19  0441 08/15/19  0530 08/14/19  1600 08/14/19  0649   WBC Thousand/uL 10 55* 8 61 8 40 7 62 7 62   HEMOGLOBIN g/dL 9 4* 9 1* 9 0* 9 5* 9 4*   HEMATOCRIT % 31 5* 29 5* 29 2* 30 3* 30 1*   PLATELETS Thousands/uL 200 193 191 211 206   POTASSIUM mmol/L 3 9 3 9 4 1 3 5 3 6   CHLORIDE mmol/L 115* 113* 111* 107 105   CO2 mmol/L 20* 20* 22 22 22   BUN mg/dL 45* 48* 52* 55* 51*   CREATININE mg/dL 2 13* 2 44* 2 36* 2 46* 2 63*   CALCIUM mg/dL 8 2* 8 1* 8 3 9 0 8 8   MAGNESIUM mg/dL  --   --  2 0  --  2 1   PHOSPHORUS mg/dL  --   --  3 8  --  4 1       Results for orders placed or performed during the hospital encounter of 08/14/19   Urine culture   Result Value Ref Range    Urine Culture >100,000 cfu/ml Escherichia coli (A)        Susceptibility    Escherichia coli - SARTHAK     ZID Performed Yes       Ampicillin ($$) <=8 00 Susceptible ug/ml     Aztreonam ($$$)  <=4 Susceptible ug/ml     Cefazolin ($) <=2 00 Susceptible ug/ml     Ciprofloxacin ($)  <=1 00 Susceptible ug/ml     Gentamicin ($$) <=1 Susceptible ug/ml     Levofloxacin ($) <=0 25 Susceptible ug/ml     Nitrofurantoin <=32 Susceptible ug/ml     Tetracycline <=4 Susceptible ug/ml     Tobramycin ($) <=1 Susceptible ug/ml     Trimethoprim + Sulfamethoxazole ($$$) <=2/38 Susceptible ug/ml   CBC and differential   Result Value Ref Range    WBC 7 62 4 31 - 10 16 Thousand/uL    RBC 3 09 (L) 3 81 - 5 12 Million/uL    Hemoglobin 9 5 (L) 11 5 - 15 4 g/dL    Hematocrit 30 3 (L) 34 8 - 46 1 %    MCV 98 82 - 98 fL    MCH 30 7 26 8 - 34 3 pg    MCHC 31 4 31 4 - 37 4 g/dL    RDW 14 6 11 6 - 15 1 %    MPV 13 0 (H) 8 9 - 12 7 fL    Platelets 246 080 - 345 Thousands/uL    nRBC 0 /100 WBCs    Neutrophils Relative 74 43 - 75 %    Immat GRANS % 1 0 - 2 %    Lymphocytes Relative 15 14 - 44 %    Monocytes Relative 5 4 - 12 %    Eosinophils Relative 5 0 - 6 %    Basophils Relative 0 0 - 1 %    Neutrophils Absolute 5 70 1 85 - 7 62 Thousands/µL    Immature Grans Absolute 0 06 0 00 - 0 20 Thousand/uL    Lymphocytes Absolute 1 11 0 60 - 4 47 Thousands/µL    Monocytes Absolute 0 36 0 17 - 1 22 Thousand/µL    Eosinophils Absolute 0 37 0 00 - 0 61 Thousand/µL    Basophils Absolute 0 02 0 00 - 0 10 Thousands/µL   Tacrolimus level   Result Value Ref Range    TACROLIMUS 4 2 2 0 - 20 0 ng/mL   BK virus, DNA, urine, quantitative   Result Value Ref Range    BK Virus DAN Qaunt PCR Negative Negative copies/mL    LOG10 BK QN PCR UR COMMENT [id number]   Basic metabolic panel   Result Value Ref Range    Sodium 140 136 - 145 mmol/L    Potassium 3 5 3 5 - 5 3 mmol/L    Chloride 107 100 - 108 mmol/L    CO2 22 21 - 32 mmol/L    ANION GAP 11 4 - 13 mmol/L    BUN 55 (H) 5 - 25 mg/dL    Creatinine 2 46 (H) 0 60 - 1 30 mg/dL    Glucose 112 65 - 140 mg/dL    Calcium 9 0 8 3 - 10 1 mg/dL    eGFR 21 ml/min/1 73sq m   UA w Reflex to Microscopic   Result Value Ref Range    Color, UA Yellow     Clarity, UA Cloudy     Specific [place], UA 1 010 1 003 - 1 030    pH, UA 8 0 4 5, 5 0, 5 5, 6 0, 6 5, 7 0, 7 5, 8 0    Leukocytes, UA Large (A) Negative    Nitrite, UA Positive (A) Negative    Protein,  (2+) (A) Negative mg/dl    Glucose, UA Negative Negative mg/dl    Ketones, UA Negative Negative mg/dl    Urobilinogen, UA 0 2 0 2, 1 0 E U /dl E U /dl    Bilirubin, UA Negative Negative    Blood, UA Large (A) Negative   Urine Microscopic   Result Value Ref Range    RBC, UA 10-20 (A) None Seen, 0-5 /hpf    WBC, UA 30-50 (A) None Seen, 0-5, 5-55, 5-65 /hpf    Epithelial Cells Occasional None Seen, Occasional /hpf    Bacteria, UA Innumerable (A) None Seen, Occasional /hpf   Comprehensive metabolic panel   Result Value Ref Range    Sodium 141 136 - 145 mmol/L    Potassium 4 1 3 5 - 5 3 mmol/L    Chloride 111 (H) 100 - 108 mmol/L    CO2 22 21 - 32 mmol/L    ANION GAP 8 4 - 13 mmol/L    BUN 52 (H) 5 - 25 mg/dL    Creatinine 2 36 (H) 0 60 - 1 30 mg/dL    Glucose 148 (H) 65 - 140 mg/dL    Calcium 8 3 8 3 - 10 1 mg/dL    AST 10 5 - 45 U/L    ALT 17 12 - 78 U/L    Alkaline Phosphatase 85 46 - 116 U/L    Total Protein 8 6 (H) 6 4 - 8 2 g/dL    Albumin 3 0 (L) 3 5 - 5 0 g/dL    Total Bilirubin 0 33 0 20 - 1 00 mg/dL    eGFR 23 ml/min/1 73sq m   Magnesium   Result Value Ref Range    Magnesium 2 0 1 6 - 2 6 mg/dL   Phosphorus   Result Value Ref Range    Phosphorus 3 8 2 7 - 4 5 mg/dL   CBC and differential   Result Value Ref Range    WBC 8 40 4 31 - 10 16 Thousand/uL    RBC 2 94 (L) 3 81 - 5 12 Million/uL    Hemoglobin 9 0 (L) 11 5 - 15 4 g/dL    Hematocrit 29 2 (L) 34 8 - 46 1 %    MCV 99 (H) 82 - 98 fL    MCH 30 6 26 8 - 34 3 pg    MCHC 30 8 (L) 31 4 - 37 4 g/dL    RDW 14 6 11 6 - 15 1 %    MPV 12 9 (H) 8 9 - 12 7 fL    Platelets 579 545 - 868 Thousands/uL    nRBC 0 /100 WBCs   Basic metabolic panel   Result Value Ref Range    Sodium 144 136 - 145 mmol/L    Potassium 3 9 3 5 - 5 3 mmol/L    Chloride 113 (H) 100 - 108 mmol/L    CO2 20 (L) 21 - 32 mmol/L    ANION GAP 11 4 - 13 mmol/L    BUN 48 (H) 5 - 25 mg/dL    Creatinine 2 44 (H) 0 60 - 1 30 mg/dL    Glucose 155 (H) 65 - 140 mg/dL    Calcium 8 1 (L) 8 3 - 10 1 mg/dL    eGFR 22 ml/min/1 73sq m   CBC and differential   Result Value Ref Range    WBC 8 61 4 31 - 10 16 Thousand/uL    RBC 2 96 (L) 3 81 - 5 12 Million/uL    Hemoglobin 9 1 (L) 11 5 - 15 4 g/dL    Hematocrit 29 5 (L) 34 8 - 46 1 %     (H) 82 - 98 fL    MCH 30 7 26 8 - 34 3 pg    MCHC 30 8 (L) 31 4 - 37 4 g/dL    RDW 14 7 11 6 - 15 1 %    MPV 12 8 (H) 8 9 - 12 7 fL    Platelets 285 672 - 998 Thousands/uL    nRBC 0 /100 WBCs    Neutrophils Relative 89 (H) 43 - 75 %    Immat GRANS % 1 0 - 2 %    Lymphocytes Relative 8 (L) 14 - 44 %    Monocytes Relative 1 (L) 4 - 12 %    Eosinophils Relative 1 0 - 6 %    Basophils Relative 0 0 - 1 %    Neutrophils Absolute 7 69 (H) 1 85 - 7 62 Thousands/µL    Immature Grans Absolute 0 05 0 00 - 0 20 Thousand/uL    Lymphocytes Absolute 0 69 0 60 - 4 47 Thousands/µL    Monocytes Absolute 0 10 (L) 0 17 - 1 22 Thousand/µL    Eosinophils Absolute 0 07 0 00 - 0 61 Thousand/µL    Basophils Absolute 0 01 0 00 - 0 10 Thousands/µL   CBC and differential   Result Value Ref Range    WBC 10 55 (H) 4 31 - 10 16 Thousand/uL    RBC 3 14 (L) 3 81 - 5 12 Million/uL    Hemoglobin 9 4 (L) 11 5 - 15 4 g/dL    Hematocrit 31 5 (L) 34 8 - 46 1 %     (H) 82 - 98 fL    MCH 29 9 26 8 - 34 3 pg    MCHC 29 8 (L) 31 4 - 37 4 g/dL    RDW 14 9 11 6 - 15 1 %    MPV 13 3 (H) 8 9 - 12 7 fL    Platelets 540 075 - 096 Thousands/uL    nRBC 0 /100 WBCs    Neutrophils Relative 91 (H) 43 - 75 %    Immat GRANS % 1 0 - 2 %    Lymphocytes Relative 6 (L) 14 - 44 %    Monocytes Relative 1 (L) 4 - 12 %    Eosinophils Relative 1 0 - 6 %    Basophils Relative 0 0 - 1 %    Neutrophils Absolute 9 47 (H) 1 85 - 7 62 Thousands/µL    Immature Grans Absolute 0 13 0 00 - 0 20 Thousand/uL    Lymphocytes Absolute 0 67 0 60 - 4 47 Thousands/µL    Monocytes Absolute 0 11 (L) 0 17 - 1 22 Thousand/µL    Eosinophils Absolute 0 14 0 00 - 0 61 Thousand/µL    Basophils Absolute 0 03 0 00 - 0 10 Thousands/µL   Basic metabolic panel   Result Value Ref Range    Sodium 144 136 - 145 mmol/L    Potassium 3 9 3 5 - 5 3 mmol/L    Chloride 115 (H) 100 - 108 mmol/L    CO2 20 (L) 21 - 32 mmol/L    ANION GAP 9 4 - 13 mmol/L    BUN 45 (H) 5 - 25 mg/dL    Creatinine 2 13 (H) 0 60 - 1 30 mg/dL    Glucose 141 (H) 65 - 140 mg/dL    Calcium 8 2 (L) 8 3 - 10 1 mg/dL    eGFR 25 ml/min/1 73sq m   Fingerstick Glucose (POCT)   Result Value Ref Range    POC Glucose 123 65 - 140 mg/dl   Fingerstick Glucose (POCT)   Result Value Ref Range    POC Glucose 153 (H) 65 - 140 mg/dl   Fingerstick Glucose (POCT)   Result Value Ref Range    POC Glucose 130 65 - 140 mg/dl   Fingerstick Glucose (POCT)   Result Value Ref Range    POC Glucose 117 65 - 140 mg/dl   Fingerstick Glucose (POCT)   Result Value Ref Range    POC Glucose 164 (H) 65 - 140 mg/dl   Fingerstick Glucose (POCT)   Result Value Ref Range    POC Glucose 98 65 - 140 mg/dl   Fingerstick Glucose (POCT)   Result Value Ref Range    POC Glucose 120 65 - 140 mg/dl   Fingerstick Glucose (POCT)   Result Value Ref Range    POC Glucose 139 65 - 140 mg/dl   Fingerstick Glucose (POCT)   Result Value Ref Range    POC Glucose 141 (H) 65 - 140 mg/dl   Fingerstick Glucose (POCT)   Result Value Ref Range    POC Glucose 145 (H) 65 - 140 mg/dl   Fingerstick Glucose (POCT)   Result Value Ref Range    POC Glucose 140 65 - 140 mg/dl   Fingerstick Glucose (POCT)   Result Value Ref Range    POC Glucose 110 65 - 140 mg/dl   Fingerstick Glucose (POCT)   Result Value Ref Range    POC Glucose 179 (H) 65 - 140 mg/dl   Manual Differential(PHLEBS Do Not Order)   Result Value Ref Range    Segmented % 93 (H) 43 - 75 %    Lymphocytes % 4 (L) 14 - 44 %    Monocytes % 0 (L) 4 - 12 %    Eosinophils, % 0 0 - 6 %    Basophils % 0 0 - 1 %    Metamyelocytes% 1 0 - 1 %    Atypical Lymphocytes % 2 (H) <=0 %    Absolute Neutrophils 7 81 (H) 1 85 - 7 62 Thousand/uL    Lymphocytes Absolute 0 34 (L) 0 60 - 4 47 Thousand/uL    Monocytes Absolute 0 00 0 00 - 1 22 Thousand/uL    Eosinophils Absolute 0 00 0 00 - 0 40 Thousand/uL    Basophils Absolute 0 00 0 00 - 0 10 Thousand/uL    Total Counted      RBC Morphology Present     Polychromasia Present     Platelet Estimate Adequate Adequate    Giant PLTs Present

## 2019-08-19 NOTE — PROGRESS NOTES
Hematology/Oncology Outpatient Follow- up Note  Redd Mascorro 53 y o  female MRN: @ Encounter: [id number]        Date:  8/20/2019      Assessment / Plan:    Progressive IgG kappa multiple myeloma diagnosed in 04/2019 from smoldering myeloma diagnosed in 09/2017 the patient has complicated medical history including kidney and pancreatic transplant in 1998 with subsequent pancreatic insufficiency, worsening kidney function, diabetes mellitus type 1 insulin dependent, bone marrow biopsy on 04/2019 showed more progressive myeloma with plasma cells about 35%, monosomy 13, hyperdeploidy      No response to 1 month Ixazomib  She also had severe grade 3 rash secondary to Ixazomib  Lenalidomide 5 mg p o  Daily 3 weeks on 1 week off initiated 8/7/2019 and d/c'd 8/13/2019 when she was admitted to the hospital secondary to acute on chronic renal failure she also had grade 3 rash which responded to Benadryl and Solu-Medrol  This was not as pruritic as that was when she was on Ninlaro  We discussed treatment options  Daratumumab could be considered  Patient has poor venous access and does not wish to have a port  She would like to defer IV treatment  She states she was not bothered by the rash with Revlimid wishes to retry that  Revlimid 5 milligrams every other day 3 weeks on 1 week off to start  She is to call 1st sign of pruritic rash  Keep appointment for early September as scheduled  She is planning on going to [facility] for 2 weeks September 19 through September 30th  Patient was seen and treatment plan made with Dr Morey Shone        2   Anemia multifactorial due to CKD, CD, MM      HPI: Kiran Dowd is a 69-year-old  female with history of type 1 diabetes mellitus, diabetic neuropathy, diabetic nephropathy, status post pancreas and kidney transplant in 1998 at [address], amputation of the right big toe, cholecystectomy, vitamin-D deficiency, exacerbation of diabetes mellitus while she is on insulin, possible pancreas burn out, herpes zoster x2, who was found to have abnormal serum protein electrophoresis in August 2017 with IgG kappa a peak of 0 4 grams/deciliter and peak 2  of 2 27 grams/deciliter, mildly elevated kappa light chain, chronic kidney disease     Status post bone marrow biopsy 9/20/2017 showed 15-20% plasma cells, normal cytogenetics and normal FISH panel for myeloma   Bone skeletal survey showed no evidence of lytic lesions   She was diagnosed with smoldering multiple myeloma   She was meet with Dr Yaneli Shields from [facility] to continue to observe     She has diabetic neuropathy grade 3, uses a cane for ambulation, herpes zoster in February 2019      On 04/10/2019 kappa light chain 69, lambda light chain 17 2 with a ratio of 4 0 which increased from ratio of 2 8 in January 2019  Serum protein electrophoresis showed M protein of 0 5 and 2 67 IgG kappa, IgG 2 9 g, IgA 62, IgM 31, creatinine 2 0, total protein 9, albumin 3, calcium 8 6     Repeat bone marrow biopsy on 04/17/2019 showed progressive multiple myeloma with plasma cells in the range of 35%, now with multiple chromosomal abnormalities including 13 Q deletion, 11 Q, trisomy 11, gain of 17 PO trisomy 17, hyperdiploid with cane of additional copies of chromosome 5, 6, 7, 9, 11, 15, 17, 18, large deletion involving most of the chromosome 13 (monosomy 13) 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     Initiated on Ixazomib with severe pruritus requiring discontinuation in June 2019     Lenalidomide 5 mg p o  Daily 3 weeks on 1 week off was approved and initiated 8/7/2019 and d/c'd 8/13/2019 when she was admitted to the hospital secondary to acute on chronic renal failure she also had grade 3 rash which responded to Benadryl and Solu-Medrol           Interval History:  Admitted 8/14- 8/17/2019 2nd to acute on chronic renal failure  She had worsening creatinine with Hematuria and pyuria    Lasix dose was increased 1 week ago from 20 mg to 60 mg daily  Started on Lenalidomide  6 days prior to admission  Held  8/15-ultrasound completed on transplant kidney, concerning for ATN versus possible rejection  Treated for E coli UTI  She had a Pruritic Maculopapular rash mostly on bilateral thigh and groin area that started 8/13/2019 - improved with benadryl and solumedrol  Met with her nephrologist, Dr Margo Morley 8/19/2019  Advised to r/s lasix 20mg po daily  Feeling much better  No urinary burning  Appetite has improved  She felt that it was decreased with Revlimid  She will be away in [facility] September 19 through 9/30/2019  She anticipates moving there permanently within the next year to be closer to family  Though approximately 3 5 hours away, she anticipates that most of her medical care will be at [facility] when she moves  Test Results:        Labs:   Lab Results   Component Value Date    HGB 9 4 (L) 08/17/2019    HCT 31 5 (L) 08/17/2019     (H) 08/17/2019     08/17/2019    WBC 10 55 (H) 08/17/2019    NRBC 0 08/17/2019    ATYLMPCT 2 (H) 08/15/2019     Lab Results   Component Value Date     (L) 01/06/2016    K 3 9 08/17/2019     (H) 08/17/2019    CO2 20 (L) 08/17/2019    ANIONGAP 6 01/06/2016    BUN 45 (H) 08/17/2019    CREATININE 2 13 (H) 08/17/2019    GLUCOSE 103 09/13/2017    GLUF 122 (H) 08/14/2019    CALCIUM 8 2 (L) 08/17/2019    CORRECTEDCA 11 7 (H) 08/28/2018    AST 10 08/15/2019    ALT 17 08/15/2019    ALKPHOS 85 08/15/2019    PROT 8 3 (H) 12/14/2015    BILITOT 0 27 12/14/2015    EGFR 25 08/17/2019       Imaging: Us Kidney And Bladder    Result Date: 8/14/2019  Narrative: RENAL ULTRASOUND INDICATION:   Patient has left allograft renal transplant that needs evaluation    COMPARISON: None TECHNIQUE:   Ultrasound of the retroperitoneum was performed with a curvilinear transducer utilizing volumetric sweeps and still imaging techniques   FINDINGS: KIDNEYS: Right kidney Atrophic measuring only 6 1 x 3 3 cm  Cortex measures 0 8 cm  Cortex is echogenic  No suspicious masses detected  No hydronephrosis  No shadowing calculi  No perinephric fluid collections  Left kidney Atrophic measuring only 6 2 x 3 2 cm  Cortex measures 0 8 cm  Cortex is echogenic  No suspicious masses detected  No hydronephrosis  No shadowing calculi  No perinephric fluid collections  URETERS: Nonvisualized  Left pelvic Transplant kidney 13 2 x 7 5 cm, previously 12 3 x 6 7 cm  Cortex measures 1 6 cm, previously 1 5 cm  Cortical echogenicity within normal limits  Previous small cyst is not visible on this exam  No suspicious masses detected  No hydronephrosis  No shadowing calculi  No perinephric fluid collections  Arterial resistive index ranging between 0 8 and 0 4 cm, previously ranging between 0 7 and 0 4 cm  Transplant renal vein is patent  BLADDER: Incompletely distended  The echogenic lesions and diverticula identified on the prior ultrasound could not be confirmed on this exam  No ureteral jets detected  Impression: Atrophic native kidneys  Elevated arterial resistive indices in the transplant kidney up to 0 8 (previously up to 0 7) concerning for potential development of transplant rejection or ATN  Nephrology consult recommended  No hydronephrosis or renal vein thrombosis identified  No perinephric fluid collections  The study was marked in [facility] for immediate notification  Workstation performed: [id number]     Mammo Screening Bilateral W 3d & Cad    Result Date: 8/12/2019  Narrative: DIAGNOSIS: Encounter for screening mammogram for malignant neoplasm of breast RELEVANT HISTORY: Family Breast Cancer History: History of breast cancer in Maternal Grandmother  Family Medical History: Family medical history includes breast cancer in maternal grandmother  Personal History: Hormone history includes birth control  No known relevant surgical history  No known relevant medical history  COMPARISONS: Prior mammograms dated: 06/28/2018, 04/14/2017, 03/04/2016, 02/27/2015, and 01/06/2014 INDICATION: Louise Arvizu is a 53 y o  female presenting for screening mammography  TECHNIQUE: The current study was evaluated with Computer Aided Detection  TISSUE DENSITY: The breasts are heterogeneously dense, which may obscure small masses  The patient is scheduled in a reminder system for screening mammography  8-10% of cancers will be missed on mammography  Management of a palpable abnormality must be based on clinical grounds  Patients will be notified of their results via letter from our facility  Accredited by [employer] of [employer] and FDA  RISK ASSESSMENT: 5 Year Tyrer-Cuzick: 3 31 % 10 Year Tyrer-Cuzick: 7 2 % Lifetime Tyrer-Cuzick: 22 39 % FINDINGS: There are no suspicious masses, grouped microcalcifications or areas of architectural distortion  Impression: No mammographic evidence of malignancy  This patient has been identified as being at elevated risk for development of breast cancer based on the Tyrer-Cuzick model  She may benefit from supplemental screening  ASSESSMENT/BI-RADS CATEGORY: Left: 1 - Negative Right: 1 - Negative Overall: 1 - Negative RECOMMENDATION:      - Routine screening mammogram in 1 year for both breasts  Workstation ID: [id number]           ROS:  As mentioned in HPI & Interval History otherwise 14 point ROS negative  Allergies: Allergies   Allergen Reactions    Cefadroxil     Latex Rash    Morphine Other (See Comments)     Other reaction(s): Other (See Comments)  projectile vomiting    Morphine And Related GI Intolerance    Myrbetriq [Mirabegron] Hives    Penicillins Hives     Other reaction(s): Other (See Comments)  Respiratory Distress,hives  Tolerates cefazolin    Revlimid [Lenalidomide] Rash     Current Medications: Reviewed  PMH/FH/SH:  Reviewed      Physical Exam:    There is no height or weight on file to calculate BSA      Ht Readings from Last 3 Encounters:   19 5' 8" (1 727 m)   19 5' 8" (1 727 m)   19 5' 8" (1 727 m)        Wt Readings from Last 3 Encounters:   19 106 kg (234 lb)   19 102 kg (224 lb 13 9 oz)   19 103 kg (226 lb)        Temp Readings from Last 3 Encounters:   19 98 2 °F (36 8 °C)   19 97 8 °F (36 6 °C)   19 97 9 °F (36 6 °C) (Oral)        BP Readings from Last 3 Encounters:   19 (!) 176/74   19 (!) 177/69   19 152/60           Physical Exam   Constitutional: She is oriented to person, place, and time  She appears well-developed and well-nourished  No distress  HENT:   Head: Normocephalic and atraumatic  Eyes: Conjunctivae are normal    Neck: Normal range of motion  Neck supple  No tracheal deviation present  Cardiovascular: Normal rate and regular rhythm  Exam reveals no gallop and no friction rub  No murmur heard  Pulmonary/Chest: Effort normal and breath sounds normal  No respiratory distress  She has no wheezes  She has no rales  She exhibits no tenderness  Abdominal: Soft  She exhibits no distension  There is no tenderness  Lymphadenopathy:     She has no cervical adenopathy  Neurological: She is alert and oriented to person, place, and time  Skin: Skin is warm and dry  She is not diaphoretic  No erythema  No pallor  Psychiatric: She has a normal mood and affect  Her behavior is normal  Judgment and thought content normal    Vitals reviewed        ECO      Emergency Contacts:    Jasmin Cohen, [phone],

## 2019-08-20 ENCOUNTER — OFFICE VISIT (OUTPATIENT)
Dept: HEMATOLOGY ONCOLOGY | Facility: CLINIC | Age: 54
End: 2019-08-20
Payer: MEDICARE

## 2019-08-20 VITALS
OXYGEN SATURATION: 98 % | WEIGHT: 249 LBS | SYSTOLIC BLOOD PRESSURE: 160 MMHG | HEIGHT: 68 IN | BODY MASS INDEX: 37.74 KG/M2 | DIASTOLIC BLOOD PRESSURE: 60 MMHG | HEART RATE: 64 BPM | TEMPERATURE: 97.1 F | RESPIRATION RATE: 16 BRPM

## 2019-08-20 DIAGNOSIS — N18.30 ANEMIA DUE TO STAGE 3 CHRONIC KIDNEY DISEASE (HCC): ICD-10-CM

## 2019-08-20 DIAGNOSIS — Z94.0 STATUS POST KIDNEY TRANSPLANT: ICD-10-CM

## 2019-08-20 DIAGNOSIS — C90.00 MULTIPLE MYELOMA NOT HAVING ACHIEVED REMISSION (HCC): ICD-10-CM

## 2019-08-20 DIAGNOSIS — R21 SKIN RASH: Primary | ICD-10-CM

## 2019-08-20 DIAGNOSIS — D84.9 IMMUNOSUPPRESSION (HCC): ICD-10-CM

## 2019-08-20 DIAGNOSIS — N18.9 CHRONIC KIDNEY DISEASE, UNSPECIFIED CKD STAGE: ICD-10-CM

## 2019-08-20 DIAGNOSIS — D63.1 ANEMIA DUE TO STAGE 3 CHRONIC KIDNEY DISEASE (HCC): ICD-10-CM

## 2019-08-20 PROCEDURE — 99215 OFFICE O/P EST HI 40 MIN: CPT | Performed by: PHYSICIAN ASSISTANT

## 2019-08-22 DIAGNOSIS — C90.00 MULTIPLE MYELOMA NOT HAVING ACHIEVED REMISSION (HCC): Primary | ICD-10-CM

## 2019-08-22 RX ORDER — LENALIDOMIDE 5 MG/1
CAPSULE ORAL
Qty: 10 CAPSULE | Refills: 0 | Status: SHIPPED | OUTPATIENT
Start: 2019-08-22 | End: 2019-09-13 | Stop reason: HOSPADM

## 2019-08-23 ENCOUNTER — TELEPHONE (OUTPATIENT)
Dept: FAMILY MEDICINE CLINIC | Facility: CLINIC | Age: 54
End: 2019-08-23

## 2019-08-23 ENCOUNTER — OFFICE VISIT (OUTPATIENT)
Dept: FAMILY MEDICINE CLINIC | Facility: CLINIC | Age: 54
End: 2019-08-23
Payer: MEDICARE

## 2019-08-23 VITALS
BODY MASS INDEX: 37.89 KG/M2 | RESPIRATION RATE: 16 BRPM | DIASTOLIC BLOOD PRESSURE: 64 MMHG | OXYGEN SATURATION: 97 % | HEART RATE: 60 BPM | HEIGHT: 68 IN | TEMPERATURE: 98.2 F | WEIGHT: 250 LBS | SYSTOLIC BLOOD PRESSURE: 156 MMHG

## 2019-08-23 DIAGNOSIS — N17.9 ACUTE RENAL FAILURE SUPERIMPOSED ON STAGE 4 CHRONIC KIDNEY DISEASE, UNSPECIFIED ACUTE RENAL FAILURE TYPE (HCC): Primary | ICD-10-CM

## 2019-08-23 DIAGNOSIS — E03.9 ACQUIRED HYPOTHYROIDISM: ICD-10-CM

## 2019-08-23 DIAGNOSIS — N18.4 ACUTE RENAL FAILURE SUPERIMPOSED ON STAGE 4 CHRONIC KIDNEY DISEASE, UNSPECIFIED ACUTE RENAL FAILURE TYPE (HCC): Primary | ICD-10-CM

## 2019-08-23 DIAGNOSIS — Z94.0 RENAL TRANSPLANT RECIPIENT: ICD-10-CM

## 2019-08-23 DIAGNOSIS — M81.0 OSTEOPOROSIS WITHOUT CURRENT PATHOLOGICAL FRACTURE, UNSPECIFIED OSTEOPOROSIS TYPE: ICD-10-CM

## 2019-08-23 DIAGNOSIS — C90.00 MULTIPLE MYELOMA NOT HAVING ACHIEVED REMISSION (HCC): ICD-10-CM

## 2019-08-23 DIAGNOSIS — E10.42 CONTROLLED TYPE 1 DIABETES MELLITUS WITH DIABETIC POLYNEUROPATHY (HCC): ICD-10-CM

## 2019-08-23 DIAGNOSIS — R21 SKIN RASH: ICD-10-CM

## 2019-08-23 DIAGNOSIS — I10 ESSENTIAL HYPERTENSION: ICD-10-CM

## 2019-08-23 DIAGNOSIS — N39.0 RECURRENT UTI: ICD-10-CM

## 2019-08-23 PROCEDURE — 1111F DSCHRG MED/CURRENT MED MERGE: CPT | Performed by: FAMILY MEDICINE

## 2019-08-23 PROCEDURE — 99495 TRANSJ CARE MGMT MOD F2F 14D: CPT | Performed by: FAMILY MEDICINE

## 2019-08-23 NOTE — PROGRESS NOTES
Chief Complaint   Patient presents with    Transition of Care Management     8/14-8/17/19 Acute renal failure superimposed on stage 3 chronic kidney disease [facility])      Health Maintenance   Topic Date Due    Medicare Annual Wellness Visit (AWV)  1965    HEPATITIS B VACCINES (1 of 3 - Risk 3-dose series) 03/08/1983    DTaP,Tdap,and Td Vaccines (1 - Tdap) 03/08/1985    PAP SMEAR  03/08/1985    Pneumococcal Vaccine: Pediatrics (0 to 5 Years) and At-Risk Patients (6 to 59 Years) (3 of 3 - PCV13) 10/06/2018    INFLUENZA VACCINE  10/20/2019 (Originally 7/1/2019)    DXA SCAN  10/17/2019    HEMOGLOBIN A1C  11/29/2019    URINE MICROALBUMIN  02/05/2020    BMI: Followup Plan  05/29/2020    Diabetic Foot Exam  07/01/2020    DM Eye Exam  08/09/2020    BMI: Adult  08/23/2020    MAMMOGRAM  08/12/2021    CRC Screening: Colonoscopy  08/07/2023    Pneumococcal Vaccine: 65+ Years (1 of 2 - PCV13) 03/08/2029    Hepatitis C Screening  Completed     Assessment/Plan:     Patient presents for TCM visit  She was hospitalized at [address] [place] [address] 8/14/19 - 8/17/19 for acute renal failure superimposed on CKD stage 4  Acute renal failure superimposed on stage 4 chronic kidney disease ([facility] )  Patient's condition improved with IV fluid rehydration  Blood work done on the date of discharge 8/17/19 showed creatinine 2 36  Recommended to stay well hydrated  Avoid NSAID's  Follow- up with nephrologist Dr Mulu Engel  Skin rash  Rash improved after administration of IV Solu- Medrol and Benadryl  Rash was likely related to chemotherapy  Recurrent UTI  Patient completed antibiotic therapy with Cipro for E  coli UTI  Denies dysuria  No fever, chills  Essential hypertension   Blood pressure is stable  Continue current medical therapy  Follow- up with cardiologist Dr Suze Richards in November 2019      Multiple myeloma not having achieved remission ([facility] )  Management per hematology- oncology Dr Cass Hand    Patient was recommended to retry Revlimid 5 mg every other day 3 weeks on and 1 week off  Follow-up with Dr Cass Hand next month  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 ([facility] )  Lab Results   Component Value Date    HGBA1C 5 1 05/29/2019       No results for input(s): POCGLU in the last 72 hours  Blood Sugar Average: Last 72 hrs:    Blood sugar at home ranges 95 -160's  Continue current Insulin therapy based on insulin sliding scale  Follow- up with endocrinology Dr Devika Moreira  Acquired hypothyroidism  Continue Levothyroxine 125 mcg daily  Keep follow-up office visit as scheduled in 12/19  Diagnoses and all orders for this visit:    Acute renal failure superimposed on stage 4 chronic kidney disease, unspecified acute renal failure type (HCC)    Skin rash    Renal transplant recipient    Recurrent UTI    Essential hypertension    Multiple myeloma not having achieved remission ([facility] )    Controlled type 1 diabetes mellitus with diabetic polyneuropathy ([facility] )    Osteoporosis without current pathological fracture, unspecified osteoporosis type  -     DXA bone density spine hip and pelvis; Future    Acquired hypothyroidism         Subjective:     Patient ID: Loreto Atkinson is a 53 y o  female  HPI    Patient presents for TCM visit  She was hospitalized at [facility] 8/14/19 - 8/17/19 for acute renal failure superimposed on CKD  Reviewed hospital records, test results, discharge medications with patient  Patient originally presented to the hospital due to findings of acute kidney injury on outpatient labs  Creatinine level on admission was 2 63  She was seen in consultation by nephrology  Patient has multiple myeloma  She was started on new chemotherapy Lenalidomide 6 days prior to admission  Patient was treated with IV fluids  Lenalidomide was put on hold         Patient was diagnosed with E coli  UTI, started on Cipro  Creatinine level trended down to 2 36  Patient was found to have pruritic maculopapular rash on arms, bilateral thighs and BL groin area  She was treated with IV Solu-Medrol, Benadryl  Case was discussed with oncology who stated that rash was likely related to chemotherapy  Patient is S/P kidney and pancreatic transplant in 1998  She continued on Prograf an oral steroids  HTN  - patient was advised to continue Lopressor, Hydralazine, Cardura, Clonidine, Amlodipine  Type 1 DM -  blood sugar during hospitalization was ranging 140 -180's  Patient did not have any hypoglycemic episodes  She was discharge to home on August 17, 2019 in stable condition  Rash resolved  Patient completed Abx therapy with Cipro for UTI  Denies chest pain, SOB, dizziness  C/o urinary frequency  No dysuria  Patient was seen by oncology on August 20, 2019  She was recommended to retry  Revlimid   5 mg every other day 3 weeks on and 1week off  Patient is planning on going to [facility] for 2 weeks in September  She will follow -up with oncology Dr Karen Wing  next month  Patient requesting order to schedule DEXA scan as recommended by nephrologist   Dr Jayme Menchaca  Review of Systems   Constitutional: Positive for fatigue  Negative for activity change, appetite change, chills and fever  HENT: Negative for congestion, ear pain, nosebleeds, sore throat, tinnitus and trouble swallowing  Eyes: Negative for pain, discharge, redness, itching and visual disturbance  Respiratory: Negative for cough, chest tightness, shortness of breath and wheezing  Cardiovascular: Negative for chest pain, palpitations and leg swelling  Gastrointestinal: Negative for abdominal pain, blood in stool, constipation, diarrhea, nausea and vomiting  Genitourinary: Positive for frequency  Negative for difficulty urinating, dysuria, hematuria and pelvic pain     Musculoskeletal: Positive for gait problem (ambulates with a cane)  Negative for arthralgias, back pain, joint swelling and neck pain  Skin: Negative for rash and wound  Neurological: Positive for numbness (in feet)  Negative for dizziness, syncope and headaches  Hematological: Negative for adenopathy  Bruises/bleeds easily  Psychiatric/Behavioral: Positive for sleep disturbance  Anxiety / Depression - mood has been stable         Objective:     Physical Exam   Constitutional: She appears well-developed and well-nourished  Obese   HENT:   Head: Normocephalic and atraumatic  Right Ear: External ear normal    Left Ear: External ear normal    Mouth/Throat: Oropharynx is clear and moist    Eyes: Pupils are equal, round, and reactive to light  Conjunctivae are normal    Neck: Normal range of motion  Neck supple  No JVD present  Cardiovascular: Normal rate and regular rhythm  Murmur (soft sytolic murmur at R USB and L USB) heard  No BL LE edema   Pulmonary/Chest: Effort normal and breath sounds normal    Abdominal: Soft  Bowel sounds are normal  There is no tenderness  Musculoskeletal:   Ambulates with a cane  No joints swelling or tenderness   Skin: Skin is warm and dry  No rash noted  Psychiatric: She has a normal mood and affect  Nursing note and vitals reviewed  Vitals:    08/23/19 1110 08/23/19 1155   BP: 162/78 156/64   BP Location: Left arm Left arm   Patient Position: Sitting Sitting   Cuff Size: Adult Standard   Pulse: 60 60   Resp: 14 16   Temp: 98 2 °F (36 8 °C)    TempSrc: Tympanic    SpO2: 97%    Weight: 113 kg (250 lb)    Height: 5' 8" (1 727 m)        Transitional Care Management Review: Julia Ruth is a 53 y o  female here for TCM follow up       During the TCM phone call patient stated:    TCM Call (since 7/23/2019)     Date and time call was made  8/19/2019  1:37 PM    Hospital care reviewed  Records reviewed    Patient was hospitialized at  [facility]    Date of Admission 08/14/19    Date of discharge  08/17/19    Diagnosis  Acute renal failure superimposed on stage 3 chronic kidney disease ([facility] )    Disposition  Home    Current Symptoms  None      TCM Call (since 7/23/2019)     Post hospital issues  None    Scheduled for follow up?   Yes <img src='C:FILES ([email or web address])    Did you obtain your prescribed medications  Yes    Do you need help managing your prescriptions or medications  No    Is transportation to your appointment needed  No    I have advised the patient to call PCP with any new or worsening symptoms  Felix Lewis ELENA    Living Arrangements  Alone    Are you recieving any outpatient services  No    Are you recieving home care services  No    Are you using any community resources  No    Current waiver services  No    Have you fallen in the last 12 months  No    Interperter language line needed  No    Counseling  Patient          Graham Hernandez MD

## 2019-08-24 NOTE — ASSESSMENT & PLAN NOTE
Blood pressure is stable  Continue current medical therapy  Follow- up with cardiologist Dr Andre Han in November 2019

## 2019-08-24 NOTE — ASSESSMENT & PLAN NOTE
Management per hematology- oncology Dr Tim Miguel    Patient was recommended to retry Revlimid 5 mg every other day 3 weeks on and 1 week off  Follow-up with Dr Tim Miguel next month

## 2019-08-24 NOTE — ASSESSMENT & PLAN NOTE
Rash improved after administration of IV Solu- Medrol and Benadryl  Rash was likely related to chemotherapy

## 2019-08-24 NOTE — ASSESSMENT & PLAN NOTE
Patient's condition improved with IV fluid rehydration  Blood work done on the date of discharge 8/17/19 showed creatinine 2 36  Recommended to stay well hydrated  Avoid NSAID's  Follow- up with nephrologist Dr Hernan Edmondson

## 2019-08-24 NOTE — ASSESSMENT & PLAN NOTE
Lab Results   Component Value Date    HGBA1C 5 1 05/29/2019       No results for input(s): POCGLU in the last 72 hours  Blood Sugar Average: Last 72 hrs:    Blood sugar at home ranges 95 -160's  Continue current Insulin therapy based on insulin sliding scale  Follow- up with endocrinology Dr Radha Almonte

## 2019-08-26 ENCOUNTER — PATIENT OUTREACH (OUTPATIENT)
Dept: FAMILY MEDICINE CLINIC | Facility: CLINIC | Age: 54
End: 2019-08-26

## 2019-09-09 ENCOUNTER — TELEPHONE (OUTPATIENT)
Dept: ENDOCRINOLOGY | Facility: CLINIC | Age: 54
End: 2019-09-09

## 2019-09-09 ENCOUNTER — LAB (OUTPATIENT)
Dept: LAB | Age: 54
DRG: 682 | End: 2019-09-09
Payer: MEDICARE

## 2019-09-09 ENCOUNTER — APPOINTMENT (EMERGENCY)
Dept: RADIOLOGY | Facility: HOSPITAL | Age: 54
DRG: 682 | End: 2019-09-09
Payer: MEDICARE

## 2019-09-09 ENCOUNTER — TRANSCRIBE ORDERS (OUTPATIENT)
Dept: ADMINISTRATIVE | Age: 54
End: 2019-09-09

## 2019-09-09 ENCOUNTER — HOSPITAL ENCOUNTER (INPATIENT)
Facility: HOSPITAL | Age: 54
LOS: 4 days | Discharge: HOME | DRG: 682 | End: 2019-09-13
Attending: EMERGENCY MEDICINE | Admitting: INTERNAL MEDICINE
Payer: MEDICARE

## 2019-09-09 ENCOUNTER — TELEPHONE (OUTPATIENT)
Dept: OTHER | Facility: HOSPITAL | Age: 54
End: 2019-09-09

## 2019-09-09 DIAGNOSIS — Z94.0 KIDNEY TRANSPLANTED: ICD-10-CM

## 2019-09-09 DIAGNOSIS — N17.9 AKI (ACUTE KIDNEY INJURY) (HCC): Primary | ICD-10-CM

## 2019-09-09 DIAGNOSIS — N18.4 CKD (CHRONIC KIDNEY DISEASE), STAGE IV (HCC): ICD-10-CM

## 2019-09-09 DIAGNOSIS — N19 UREMIA: ICD-10-CM

## 2019-09-09 DIAGNOSIS — E83.39 HYPERPHOSPHATEMIA: ICD-10-CM

## 2019-09-09 DIAGNOSIS — E03.9 ACQUIRED HYPOTHYROIDISM: ICD-10-CM

## 2019-09-09 DIAGNOSIS — Z94.0 TRANSPLANTED KIDNEY: ICD-10-CM

## 2019-09-09 DIAGNOSIS — N18.9 CKD (CHRONIC KIDNEY DISEASE): ICD-10-CM

## 2019-09-09 DIAGNOSIS — N39.0 RECURRENT UTI: ICD-10-CM

## 2019-09-09 DIAGNOSIS — D89.2 PARAPROTEINEMIA: ICD-10-CM

## 2019-09-09 DIAGNOSIS — I10 BENIGN ESSENTIAL HTN: ICD-10-CM

## 2019-09-09 DIAGNOSIS — E10.65 TYPE 1 DIABETES MELLITUS WITH HYPERGLYCEMIA (HCC): ICD-10-CM

## 2019-09-09 DIAGNOSIS — I10 ESSENTIAL HYPERTENSION: ICD-10-CM

## 2019-09-09 DIAGNOSIS — K59.00 CONSTIPATION: ICD-10-CM

## 2019-09-09 DIAGNOSIS — N18.4 CHRONIC KIDNEY DISEASE, STAGE 4 (SEVERE) (HCC): ICD-10-CM

## 2019-09-09 DIAGNOSIS — Z94.0 RENAL TRANSPLANT RECIPIENT: ICD-10-CM

## 2019-09-09 DIAGNOSIS — D84.9 IMMUNOSUPPRESSION (HCC): ICD-10-CM

## 2019-09-09 DIAGNOSIS — Z94.0 RENAL TRANSPLANT RECIPIENT: Primary | ICD-10-CM

## 2019-09-09 DIAGNOSIS — N39.0 UTI (URINARY TRACT INFECTION): ICD-10-CM

## 2019-09-09 LAB
ANISOCYTOSIS BLD QL SMEAR: PRESENT
BASE EX.OXY STD BLDV CALC-SCNC: 94.5 % (ref 60–80)
BASE EX.OXY STD BLDV CALC-SCNC: 96.3 % (ref 60–80)
BASE EXCESS BLDV CALC-SCNC: -13.6 MMOL/L
BASE EXCESS BLDV CALC-SCNC: -16.3 MMOL/L
BASOPHILS # BLD MANUAL: 0.26 THOUSAND/UL (ref 0–0.1)
BASOPHILS NFR MAR MANUAL: 4 % (ref 0–1)
EOSINOPHIL # BLD MANUAL: 0.46 THOUSAND/UL (ref 0–0.4)
EOSINOPHIL NFR BLD MANUAL: 7 % (ref 0–6)
ERYTHROCYTE [DISTWIDTH] IN BLOOD BY AUTOMATED COUNT: 15.9 % (ref 11.6–15.1)
EST. AVERAGE GLUCOSE BLD GHB EST-MCNC: 100 MG/DL
GLUCOSE SERPL-MCNC: 101 MG/DL (ref 65–140)
HBA1C MFR BLD: 5.1 % (ref 4.2–6.3)
HCO3 BLDV-SCNC: 11.2 MMOL/L (ref 24–30)
HCO3 BLDV-SCNC: 12.4 MMOL/L (ref 24–30)
HCT VFR BLD AUTO: 28.5 % (ref 34.8–46.1)
HGB BLD-MCNC: 8.2 G/DL (ref 11.5–15.4)
LACTATE SERPL-SCNC: 0.4 MMOL/L (ref 0.5–2)
LDH SERPL-CCNC: 138 U/L (ref 81–234)
LYMPHOCYTES # BLD AUTO: 0.59 THOUSAND/UL (ref 0.6–4.47)
LYMPHOCYTES # BLD AUTO: 9 % (ref 14–44)
MAGNESIUM SERPL-MCNC: 2.4 MG/DL (ref 1.6–2.6)
MCH RBC QN AUTO: 28.5 PG (ref 26.8–34.3)
MCHC RBC AUTO-ENTMCNC: 28.8 G/DL (ref 31.4–37.4)
MCV RBC AUTO: 99 FL (ref 82–98)
MONOCYTES # BLD AUTO: 0.26 THOUSAND/UL (ref 0–1.22)
MONOCYTES NFR BLD: 4 % (ref 4–12)
NEUTROPHILS # BLD MANUAL: 4.94 THOUSAND/UL (ref 1.85–7.62)
NEUTS SEG NFR BLD AUTO: 76 % (ref 43–75)
NRBC BLD AUTO-RTO: 0 /100 WBCS
O2 CT BLDV-SCNC: 11.9 ML/DL
O2 CT BLDV-SCNC: 12.3 ML/DL
PCO2 BLDV: 28.9 MM HG (ref 42–50)
PCO2 BLDV: 32.1 MM HG (ref 42–50)
PH BLDV: 7.16 [PH] (ref 7.3–7.4)
PH BLDV: 7.25 [PH] (ref 7.3–7.4)
PHOSPHATE SERPL-MCNC: 7.7 MG/DL (ref 2.7–4.5)
PLATELET # BLD AUTO: 201 THOUSANDS/UL (ref 149–390)
PLATELET # BLD AUTO: 209 THOUSANDS/UL (ref 149–390)
PLATELET BLD QL SMEAR: ADEQUATE
PMV BLD AUTO: 13.3 FL (ref 8.9–12.7)
PMV BLD AUTO: 14 FL (ref 8.9–12.7)
PO2 BLDV: 177.9 MM HG (ref 35–45)
PO2 BLDV: 97.8 MM HG (ref 35–45)
POIKILOCYTOSIS BLD QL SMEAR: PRESENT
RBC # BLD AUTO: 2.88 MILLION/UL (ref 3.81–5.12)
RBC MORPH BLD: PRESENT
T4 FREE SERPL-MCNC: 0.65 NG/DL (ref 0.76–1.46)
TSH SERPL DL<=0.05 MIU/L-ACNC: 2.15 UIU/ML (ref 0.36–3.74)
WBC # BLD AUTO: 6.5 THOUSAND/UL (ref 4.31–10.16)

## 2019-09-09 PROCEDURE — 82805 BLOOD GASES W/O2 SATURATION: CPT | Performed by: EMERGENCY MEDICINE

## 2019-09-09 PROCEDURE — 99285 EMERGENCY DEPT VISIT HI MDM: CPT | Performed by: EMERGENCY MEDICINE

## 2019-09-09 PROCEDURE — 82805 BLOOD GASES W/O2 SATURATION: CPT | Performed by: INTERNAL MEDICINE

## 2019-09-09 PROCEDURE — 82436 ASSAY OF URINE CHLORIDE: CPT | Performed by: STUDENT IN AN ORGANIZED HEALTH CARE EDUCATION/TRAINING PROGRAM

## 2019-09-09 PROCEDURE — 85007 BL SMEAR W/DIFF WBC COUNT: CPT

## 2019-09-09 PROCEDURE — 84443 ASSAY THYROID STIM HORMONE: CPT

## 2019-09-09 PROCEDURE — 85027 COMPLETE CBC AUTOMATED: CPT

## 2019-09-09 PROCEDURE — 83615 LACTATE (LD) (LDH) ENZYME: CPT | Performed by: INTERNAL MEDICINE

## 2019-09-09 PROCEDURE — 83735 ASSAY OF MAGNESIUM: CPT

## 2019-09-09 PROCEDURE — 84300 ASSAY OF URINE SODIUM: CPT | Performed by: STUDENT IN AN ORGANIZED HEALTH CARE EDUCATION/TRAINING PROGRAM

## 2019-09-09 PROCEDURE — 84100 ASSAY OF PHOSPHORUS: CPT

## 2019-09-09 PROCEDURE — 84133 ASSAY OF URINE POTASSIUM: CPT | Performed by: STUDENT IN AN ORGANIZED HEALTH CARE EDUCATION/TRAINING PROGRAM

## 2019-09-09 PROCEDURE — 82985 ASSAY OF GLYCATED PROTEIN: CPT

## 2019-09-09 PROCEDURE — 84439 ASSAY OF FREE THYROXINE: CPT

## 2019-09-09 PROCEDURE — 99223 1ST HOSP IP/OBS HIGH 75: CPT | Performed by: INTERNAL MEDICINE

## 2019-09-09 PROCEDURE — 99285 EMERGENCY DEPT VISIT HI MDM: CPT

## 2019-09-09 PROCEDURE — 82948 REAGENT STRIP/BLOOD GLUCOSE: CPT

## 2019-09-09 PROCEDURE — 96365 THER/PROPH/DIAG IV INF INIT: CPT

## 2019-09-09 PROCEDURE — 76776 US EXAM K TRANSPL W/DOPPLER: CPT

## 2019-09-09 PROCEDURE — 85049 AUTOMATED PLATELET COUNT: CPT | Performed by: INTERNAL MEDICINE

## 2019-09-09 PROCEDURE — 83036 HEMOGLOBIN GLYCOSYLATED A1C: CPT

## 2019-09-09 PROCEDURE — 83605 ASSAY OF LACTIC ACID: CPT | Performed by: INTERNAL MEDICINE

## 2019-09-09 PROCEDURE — 80197 ASSAY OF TACROLIMUS: CPT | Performed by: INTERNAL MEDICINE

## 2019-09-09 RX ORDER — METOPROLOL TARTRATE 50 MG/1
50 TABLET, FILM COATED ORAL EVERY 12 HOURS SCHEDULED
Status: DISCONTINUED | OUTPATIENT
Start: 2019-09-09 | End: 2019-09-13 | Stop reason: HOSPADM

## 2019-09-09 RX ORDER — ROPINIROLE 0.25 MG/1
0.25 TABLET, FILM COATED ORAL 2 TIMES DAILY
Status: DISCONTINUED | OUTPATIENT
Start: 2019-09-09 | End: 2019-09-13 | Stop reason: HOSPADM

## 2019-09-09 RX ORDER — TACROLIMUS 1 MG/1
2 CAPSULE ORAL
Status: DISCONTINUED | OUTPATIENT
Start: 2019-09-09 | End: 2019-09-13 | Stop reason: HOSPADM

## 2019-09-09 RX ORDER — INSULIN GLARGINE 100 [IU]/ML
1 INJECTION, SOLUTION SUBCUTANEOUS
Status: DISCONTINUED | OUTPATIENT
Start: 2019-09-09 | End: 2019-09-09

## 2019-09-09 RX ORDER — PREDNISONE 1 MG/1
5 TABLET ORAL DAILY
Status: DISCONTINUED | OUTPATIENT
Start: 2019-09-10 | End: 2019-09-13 | Stop reason: HOSPADM

## 2019-09-09 RX ORDER — ACETAMINOPHEN 325 MG/1
650 TABLET ORAL EVERY 6 HOURS PRN
Status: DISCONTINUED | OUTPATIENT
Start: 2019-09-09 | End: 2019-09-12

## 2019-09-09 RX ORDER — ARIPIPRAZOLE 15 MG/1
30 TABLET ORAL
Status: DISCONTINUED | OUTPATIENT
Start: 2019-09-09 | End: 2019-09-13 | Stop reason: HOSPADM

## 2019-09-09 RX ORDER — DOXAZOSIN 2 MG/1
2 TABLET ORAL
Status: DISCONTINUED | OUTPATIENT
Start: 2019-09-09 | End: 2019-09-12

## 2019-09-09 RX ORDER — FOLIC ACID 1 MG/1
1000 TABLET ORAL DAILY
Status: DISCONTINUED | OUTPATIENT
Start: 2019-09-10 | End: 2019-09-13 | Stop reason: HOSPADM

## 2019-09-09 RX ORDER — HEPARIN SODIUM 5000 [USP'U]/ML
5000 INJECTION, SOLUTION INTRAVENOUS; SUBCUTANEOUS EVERY 8 HOURS SCHEDULED
Status: DISCONTINUED | OUTPATIENT
Start: 2019-09-09 | End: 2019-09-13 | Stop reason: HOSPADM

## 2019-09-09 RX ORDER — AMLODIPINE BESYLATE 10 MG/1
10 TABLET ORAL DAILY
Status: DISCONTINUED | OUTPATIENT
Start: 2019-09-10 | End: 2019-09-13 | Stop reason: HOSPADM

## 2019-09-09 RX ORDER — DULOXETIN HYDROCHLORIDE 60 MG/1
60 CAPSULE, DELAYED RELEASE ORAL 2 TIMES DAILY
Status: DISCONTINUED | OUTPATIENT
Start: 2019-09-09 | End: 2019-09-13 | Stop reason: HOSPADM

## 2019-09-09 RX ORDER — HYDRALAZINE HYDROCHLORIDE 50 MG/1
50 TABLET, FILM COATED ORAL EVERY 8 HOURS SCHEDULED
Status: DISCONTINUED | OUTPATIENT
Start: 2019-09-09 | End: 2019-09-13 | Stop reason: HOSPADM

## 2019-09-09 RX ORDER — ASPIRIN 81 MG/1
81 TABLET, CHEWABLE ORAL DAILY
Status: DISCONTINUED | OUTPATIENT
Start: 2019-09-10 | End: 2019-09-13 | Stop reason: HOSPADM

## 2019-09-09 RX ORDER — SEVELAMER HYDROCHLORIDE 800 MG/1
800 TABLET, FILM COATED ORAL
Status: DISCONTINUED | OUTPATIENT
Start: 2019-09-10 | End: 2019-09-13 | Stop reason: HOSPADM

## 2019-09-09 RX ORDER — LEVOTHYROXINE SODIUM 0.12 MG/1
125 TABLET ORAL DAILY
Status: DISCONTINUED | OUTPATIENT
Start: 2019-09-10 | End: 2019-09-13 | Stop reason: HOSPADM

## 2019-09-09 RX ORDER — LORAZEPAM 1 MG/1
1 TABLET ORAL 3 TIMES DAILY PRN
Status: DISCONTINUED | OUTPATIENT
Start: 2019-09-09 | End: 2019-09-13 | Stop reason: HOSPADM

## 2019-09-09 RX ORDER — TACROLIMUS 1 MG/1
3 CAPSULE ORAL EVERY MORNING
Status: DISCONTINUED | OUTPATIENT
Start: 2019-09-10 | End: 2019-09-13 | Stop reason: HOSPADM

## 2019-09-09 RX ORDER — CLONIDINE HYDROCHLORIDE 0.1 MG/1
0.3 TABLET ORAL EVERY 8 HOURS SCHEDULED
Status: DISCONTINUED | OUTPATIENT
Start: 2019-09-09 | End: 2019-09-13 | Stop reason: HOSPADM

## 2019-09-09 RX ORDER — SODIUM BICARBONATE 650 MG/1
1300 TABLET ORAL 3 TIMES DAILY
Status: DISCONTINUED | OUTPATIENT
Start: 2019-09-09 | End: 2019-09-13 | Stop reason: HOSPADM

## 2019-09-09 RX ORDER — SERTRALINE HYDROCHLORIDE 100 MG/1
200 TABLET, FILM COATED ORAL DAILY
Status: DISCONTINUED | OUTPATIENT
Start: 2019-09-10 | End: 2019-09-13 | Stop reason: HOSPADM

## 2019-09-09 RX ADMIN — SODIUM CHLORIDE 1000 ML: 0.9 INJECTION, SOLUTION INTRAVENOUS at 18:44

## 2019-09-09 RX ADMIN — METOPROLOL TARTRATE 50 MG: 50 TABLET, FILM COATED ORAL at 23:12

## 2019-09-09 RX ADMIN — CLONIDINE HYDROCHLORIDE 0.3 MG: 0.1 TABLET ORAL at 23:12

## 2019-09-09 RX ADMIN — Medication 50 MEQ: at 20:25

## 2019-09-09 RX ADMIN — CEFEPIME HYDROCHLORIDE 1000 MG: 1 INJECTION, POWDER, FOR SOLUTION INTRAMUSCULAR; INTRAVENOUS at 18:59

## 2019-09-09 RX ADMIN — TACROLIMUS 2 MG: 1 CAPSULE ORAL at 23:15

## 2019-09-09 RX ADMIN — SODIUM BICARBONATE 650 MG TABLET 1300 MG: at 23:13

## 2019-09-09 RX ADMIN — HEPARIN SODIUM 5000 UNITS: 5000 INJECTION, SOLUTION INTRAVENOUS; SUBCUTANEOUS at 23:12

## 2019-09-09 RX ADMIN — DULOXETINE HYDROCHLORIDE 60 MG: 60 CAPSULE, DELAYED RELEASE ORAL at 23:12

## 2019-09-09 RX ADMIN — SODIUM BICARBONATE 125 ML/HR: 84 INJECTION, SOLUTION INTRAVENOUS at 21:56

## 2019-09-09 RX ADMIN — ROPINIROLE HYDROCHLORIDE 0.25 MG: 0.25 TABLET, FILM COATED ORAL at 23:15

## 2019-09-09 RX ADMIN — ARIPIPRAZOLE 30 MG: 15 TABLET ORAL at 23:14

## 2019-09-09 NOTE — TELEPHONE ENCOUNTER
Received lab work from the patient  Creatinine is worsening rapidly  Unclear etiology  Spoke with the patient  Patient states that he is feeling weak  No nausea, no vomiting, no diarrhea, no urinary symptoms    Given the creatinine above 4 from a baseline of 2 mg/dL -  advised the patient to go to the ER     - pt started new chemo (revlimid given rash with prior - though original chemo was revlimid and had rash with this also)  - will need evaluation for pre renal (hypovolemia) vs obstructive vs urosepsis vs sepsis of other cause vs other  - called ER

## 2019-09-09 NOTE — TELEPHONE ENCOUNTER
----- Message from Keyonna Jain MD sent at 9/9/2019  2:53 PM EDT -----  Please call the patient regarding her abnormal result  Her creatinine is elevated up to 4 4, her potassium is normal, sodium is slightly low, she should contact her nephrology regarding elevated creatinine

## 2019-09-09 NOTE — ED ATTENDING ATTESTATION
I,Will Carter MD, saw and evaluated the patient  I have discussed the patient with the resident/non-physician practitioner and agree with the resident's/non-physician practitioner's findings, Plan of Care, and MDM as documented in the resident's/non-physician practitioner's note, except where noted  All available labs and Radiology studies were reviewed  I was present for key portions of any procedure(s) performed by the resident/non-physician practitioner and I was immediately available to provide assistance  At this point I agree with the current assessment done in the Emergency Department  I have conducted an independent evaluation of this patient including a focused history and a physical exam         14-year-old female, history of multiple myeloma, history of renal transplant, history of pancreatic transplant, presenting to the emergency department for evaluation of elevated creatinine  Patient had outpatient lab work performed by her nephrologist that demonstrated an elevation in creatinine to 4 48 from a baseline of 2 13  Additionally the patient provided a urine specimen that demonstrated innumerable WBCs and bacteria  Patient has been a little bit more confused per her   Patient is reporting some difficulty with complete voiding  No reported fever, chest pain, cough, shortness of breath, vomiting, diarrhea  Ten systems reviewed negative except as noted in the history of present illness  The patient is resting comfortably on a stretcher in no acute respiratory distress  The patient appears nontoxic  HEENT reveals moist mucous membranes  Head is normocephalic and atraumatic  Conjunctiva and sclera are normal  Neck is nontender and supple with full range of motion to flexion, extension, lateral rotation  No meningismus appreciated  No masses are appreciated  Lungs are clear to auscultation bilaterally without any wheezes, rales or rhonchi   Heart is regular rate and rhythm without any murmurs, rubs or gallops  Abdomen is soft with mild tenderness to palpation over the transplanted kidney in the left lower quadrant without any rebound or guarding  Mild ecchymosis to the abdominal wall in the area of the transplanted kidney  Extremities appear grossly normal without any significant arthropathy  Patient is awake, alert, and oriented x3  The patient has normal interaction  Motor is 5 out of 5 bilateral upper lower extremities  Assessment and plan:  59-year-old female presenting to the emergency department for evaluation of elevated creatinine  Labs Reviewed   BLOOD GAS, VENOUS - Abnormal       Result Value Ref Range Status    pH, Festus 7 159 (*) 7 300 - 7 400 Final    pCO2, Festus 32 1 (*) 42 0 - 50 0 mm Hg Final    pO2, Festus 97 8 (*) 35 0 - 45 0 mm Hg Final    HCO3, Festus 11 2 (*) 24 - 30 mmol/L Final    Base Excess, Festus -16 3  mmol/L Final    O2 Content, Festus 12 3  ml/dL Final    O2 HGB, VENOUS 94 5 (*) 60 0 - 80 0 % Final   LDH   BLOOD GAS, VENOUS       US transplant kidney with doppler   Final Result   1  Elevated arterial resistive indices in the transplant kidney up to 0 8 concerning for possible development of transplant rejection or ATN  Nephrology consult recommended  No hydronephrosis or renal vein thrombosis identified  2  Atrophic native kidneys        Workstation performed: [id number]

## 2019-09-09 NOTE — ED PROVIDER NOTES
History  Chief Complaint   Patient presents with    Abnormal Lab     told creatinine was 4 4 and suspecious of UTI by pcp  patient states she has hx of multiple myeloma      40-year-old female past medical history renal transplant in 1990s on tacro, multiple myeloma on Reclovid, diabetes, and CKD Stage 4 presenting after her nephrologist told her that her creatinine bumped to 4 4 from 2 1  She also has a UA that is significant for UTI  She is currently having trouble urinating and feels as though she also has some change in mental status  She has no blood in her urine  She denies any fever, chills, vomiting, diarrhea, but does endorse nausea  She was recently here in the hospital for urinary tract infection that was treated and she was discharged on ciprofloxacin  History provided by:  Patient   used: No    Evaluation of Abnormal Diagnostic Test   Patient referred by:  PCP and specialist  Resulting agency:  [facility]  Result type: chemistry    Chemistry:     Creatinine:  High      Prior to Admission Medications   Prescriptions Last Dose Informant Patient Reported? Taking?    ARIPiprazole (ABILIFY) 30 mg tablet  Self No No   Sig: Take 1 tablet (30 mg total) by mouth daily at bedtime for 180 days   Cholecalciferol (VITAMIN D3) 1000 units CAPS  Self Yes No   Sig: Take 3 capsules by mouth daily     DULoxetine (CYMBALTA) 60 mg delayed release capsule  Self No No   Sig: Take 1 capsule (60 mg total) by mouth 2 (two) times a day for 180 days   Insulin Glargine (TOUJEO SOLOSTAR) 300 units/mL CONCETRATED U-300 injection pen  Self No No   Sig: Inject 1 Units under the skin daily at bedtime   Insulin Pen Needle (BD PEN NEEDLE VISHNU U/F) 32G X 4 MM MISC  Self No No   Sig: Use 4 times daily   LORazepam (ATIVAN) 2 mg tablet  Self No No   Sig: Take 1 tablet (2 mg total) by mouth 3 (three) times a day as needed for anxiety for up to 120 days To be filled on or after 6/29/19   Lancets (ONETOUCH ULTRASOFT) lancets  Self Yes No   Sig: by Does not apply route 4 (four) times a day     ONE TOUCH ULTRA TEST test strip  Self No No   Sig: Use 4 times daily ( please dispense One touch Ultra test strips)   amLODIPine (NORVASC) 10 mg tablet  Self No No   Sig: TAKE 1 TABLET(10MG) BY MOUTH EVERY MORNING AND 1/2 TABLET(5MG) EVERY EVENING   aspirin 81 MG tablet  Self Yes No   Sig: Take 1 tablet by mouth daily   busPIRone (BUSPAR) 5 mg tablet  Self No No   Sig: Take 1 tablet (5 mg total) by mouth 2 (two) times a day for 180 days   cloNIDine (CATAPRES) 0 1 mg tablet  Self No No   Sig: TAKE 3 TABLETS BY MOUTH EVERY MORNING, 3 TABLETS AT NOON, AND 3 TABLETS EVERY EVENING   doxazosin (CARDURA) 1 mg tablet  Self No No   Sig: TAKE 2 TABLETS BY MOUTH EVERY NIGHT AT BEDTIME   folic acid (FOLVITE) 1 mg tablet  Self No No   Sig: TAKE 1 TABLET BY MOUTH DAILY AS DIRECTED   furosemide (LASIX) 20 mg tablet  Self No No   Sig: TAKE 1 TABLET BY MOUTH EVERY DAY   hydrALAZINE (APRESOLINE) 50 mg tablet  Self No No   Sig: TAKE 1 TABLET(50MG) BY MOUTH THREE TIMES DAILY FOR 90 DAYS   insulin lispro (HUMALOG KWIKPEN) 100 units/mL injection pen  Self No No   Sig: INJECT 10 UNDER THE SKIN EVERY DAY OR AS DIRECTED   Patient taking differently: INJECT 10 UNDER THE SKIN EVERY DAY OR AS DIRECTED   lenalidomide (REVLIMID) 5 MG CAPS  Self No No   Sig: Take 5 mg every other day for 21 days on and 7 days off [facility]#[id number] 8/22/19   levothyroxine 125 mcg tablet  Self No No   Sig: Take 1 tablet (125 mcg total) by mouth daily   metoprolol tartrate (LOPRESSOR) 50 mg tablet  Self No No   Sig: TAKE 1 TABLET(50MG TOTAL) BY MOUTH TWICE DAILY   pravastatin (PRAVACHOL) 80 mg tablet  Self No No   Sig: TAKE 1 TABLET BY MOUTH DAILY   predniSONE 5 mg tablet  Self No No   Sig: Take 1 tablet (5 mg total) by mouth daily   rOPINIRole (REQUIP) 0 25 mg tablet  Self No No   Sig: TAKE 1 TABLET BY MOUTH TWICE DAILY   sertraline (ZOLOFT) 100 mg tablet  Self No No   Sig: Take 2 tablets (200 mg total) by mouth daily for 180 days   sevelamer carbonate (RENVELA) 800 mg tablet  Self No No   Sig: Take 1 tablet (800 mg total) by mouth 3 (three) times a day with meals   sodium bicarbonate 650 mg tablet  Self No No   Sig: TAKE 2 TABLETS BY MOUTH THREE TIMES DAILY   tacrolimus (PROGRAF) 1 mg capsule  Self No No   Sig: Take 3 mg in AM and 2 mg in PM (6/18/19)      Facility-Administered Medications: None       Past Medical History:   Diagnosis Date    Abnormal liver function test     [facility]     Acute on chronic congestive heart failure (HCC)     Allergic urticaria     Anemia     Cancer (HCC)     Multiple myeloma    Cervical dysplasia     Cholelithiasis     Chronic diastolic (congestive) heart failure ([facility] ) 9/18/2017    Diabetes mellitus ([facility] )     Previous, controlled with diet    Diabetes mellitus with foot ulcer ([facility] )     Disease of thyroid gland     Encephalopathy     Hematuria     + leak est- secondary to UTIs/panc drainage    History of transfusion     Hyperkalemia     Hypertension     Iliotibial band syndrome     Lumbar radiculopathy     Multiple myeloma (HCC)     Multiple myeloma ([facility]     Night blindness     Nonrheumatic aortic (valve) insufficiency     Pneumonia     Renal disorder     Retinopathy     Seborrhea     Seizure ([facility] )     Shingles     Sinus tachycardia     B blocker - cardio echo stress test 02 normal/neg LE doppler 2/02 OK and 12/07    Status post simultaneous kidney and pancreas transplant ([facility] )     Toe amputation status (HCC)     Trochanteric bursitis        Past Surgical History:   Procedure Laterality Date    CATARACT EXTRACTION      CHOLECYSTECTOMY      COLONOSCOPY      two polyps in the rectum removed and biopsied diverticulosis in the sigmoid colon, external hemorrhoiods- Dr Barfield    COMBINED KIDNEY-PANCREAS TRANSPLANT N/A     CT BONE MARROW BIOPSY AND ASPIRATION  4/17/2019    CYSTOSCOPY N/A 10/13/2016    Procedure: CYSTOSCOPY, retrograde pyelogram, biopsy of ureteral polyp; Surgeon: Yady Nagel MD;  Location: [facility];  Service:    [facility] DILATION AND CURETTAGE OF UTERUS      ESOPHAGOGASTRODUODENOSCOPY N/A 2017    Procedure: ESOPHAGOGASTRODUODENOSCOPY (EGD); Surgeon: Viviane Buitrago DO;  Location: [facility]; Service: Gastroenterology    EYE SURGERY      cataracts    FOOT AMPUTATION THROUGH METATARSAL Left     FOOT SURGERY Right     excision of metatarsal heads    HALLUX VALGUS CORRECTION Right     NEPHRECTOMY TRANSPLANTED ORGAN      PANCREATIC TRANSPLANT REMOVAL         Family History   Problem Relation Age of Onset    Hypertension Mother     Cancer Mother     Hypertension Father     Cancer Father     Cancer Maternal Grandfather     Cancer Paternal Grandmother     Cancer Paternal Grandfather     Depression Sister     Breast cancer Maternal Grandmother 77     I have reviewed and agree with the history as documented  Social History     Tobacco Use    Smoking status: Former Smoker     Last attempt to quit: 2012     Years since quittin 3    Smokeless tobacco: Never Used   Substance Use Topics    Alcohol use: Never     Frequency: Never     Binge frequency: Never     Comment: (history)    Drug use: Never     Types: Hydrocodone     Comment: Past cocaine use many years ago - no current use        Review of Systems   Constitutional: Negative for chills, diaphoresis and fever  HENT: Negative  Eyes: Negative  Negative for visual disturbance  Respiratory: Negative  Negative for shortness of breath  Cardiovascular: Negative  Negative for chest pain  Gastrointestinal: Positive for nausea  Negative for abdominal pain and vomiting  Endocrine: Negative  Genitourinary: Positive for difficulty urinating  Musculoskeletal: Negative  Negative for myalgias  Skin: Negative  Negative for rash  Allergic/Immunologic: Negative  Neurological: Positive for speech difficulty and headaches  Negative for light-headedness and numbness  Hematological: Negative  Psychiatric/Behavioral: Negative  All other systems reviewed and are negative  Physical Exam  ED Triage Vitals [09/09/19 1634]   Temperature Pulse Respirations Blood Pressure SpO2   98 °F (36 7 °C) 63 18 (!) 164/49 98 %      Temp Source Heart Rate Source Patient Position - Orthostatic VS BP Location FiO2 (%)   Oral Monitor Sitting Right arm --      Pain Score       6             Orthostatic Vital Signs  Vitals:    09/09/19 1634 09/09/19 1830   BP: (!) 164/49 136/63   Pulse: 63 61   Patient Position - Orthostatic VS: Sitting        Physical Exam   Constitutional: She is oriented to person, place, and time  She appears well-developed and well-nourished  HENT:   Head: Normocephalic and atraumatic  Eyes: Conjunctivae are normal    Neck: Normal range of motion  Neck supple  Cardiovascular: Normal rate and regular rhythm  Pulmonary/Chest: Effort normal and breath sounds normal    Abdominal: Soft  Bowel sounds are normal  She exhibits no distension  There is tenderness  Musculoskeletal: Normal range of motion  Neurological: She is alert and oriented to person, place, and time  She has normal strength  No cranial nerve deficit  Patient has delayed confused responses  Able to remember 2/3 objects  Unable do serial sevens  Skin: Skin is warm and dry  Psychiatric: She has a normal mood and affect  Nursing note and vitals reviewed        ED Medications  Medications   LORazepam (ATIVAN) tablet 1 mg (has no administration in time range)   sodium bicarbonate 8 4 % injection 50 mEq (has no administration in time range)   sodium bicarbonate 150 mEq in dextrose 5 % 1,000 mL infusion (has no administration in time range)   cefepime (MAXIPIME) 1,000 mg in dextrose 5 % 50 mL IVPB (0 mg Intravenous Stopped 9/9/19 1951)   sodium chloride 0 9 % bolus 1,000 mL (1,000 mL Intravenous New Bag 9/9/19 5434)       Diagnostic Studies  Results Reviewed     Procedure Component Value Units Date/Time    Blood gas, venous [[id number]]     Lab Status:  No result Specimen:  Blood     Lactate dehydrogenase [[id number]]     Lab Status:  No result Specimen:  Blood     Blood gas, venous [[id number]]  (Abnormal) Collected:  09/09/19 1859    Lab Status:  Final result Specimen:  Blood from Arm, Right Updated:  09/09/19 1913     pH, Festus 7 159     pCO2, Festus 32 1 mm Hg      pO2, Festus 97 8 mm Hg      HCO3, Festus 11 2 mmol/L      Base Excess, Festus -16 3 mmol/L      O2 Content, Festus 12 3 ml/dL      O2 HGB, VENOUS 94 5 %                  US transplant kidney with doppler   Final Result by Asia Bermudez MD (09/09 1920)   1  Elevated arterial resistive indices in the transplant kidney up to 0 8 concerning for possible development of transplant rejection or ATN  Nephrology consult recommended  No hydronephrosis or renal vein thrombosis identified  2  Atrophic native kidneys  Workstation performed: [id number]               Procedures  Procedures        ED Course  ED Course as of Sep 09 2021   Mon Sep 09, 2019   1650 Blood Pressure(!): 164/49   1650 Temperature: 98 °F (36 7 °C)   1650 Pulse: 63   1650 Respirations: 18   1650 SpO2: 98 %   1915 pH, Festus(!): 7 159                               MDM  Number of Diagnoses or Management Options  EDUARDO (acute kidney injury) ([facility] ): new and requires workup  CKD (chronic kidney disease): established and worsening  Transplanted kidney: established and worsening  Uremia: new and does not require workup  Diagnosis management comments: 48-year-old female past medical history transplanted kidney on Tacroloimus and multiple myeloma on Reclovid presenting with an EDUARDO on CKD and UTI after getting outpatient labs  She was also given fluids  She was given dose of cefepime and she has had her Reclovid held  No systemic signs of infection  Ultrasound of kidney concerning for transplant rejection       She also has altered mental status most likely due to uremia  She does have a significant anion gap as well likely due to uremia  She was admitted to [facility] for further management  Patient is agreeable to plan  Amount and/or Complexity of Data Reviewed  Clinical lab tests: reviewed and ordered  Tests in the radiology section of CPT®: ordered and reviewed  Tests in the medicine section of CPT®: ordered and reviewed  Discussion of test results with the performing providers: yes  Decide to obtain previous medical records or to obtain history from someone other than the patient: yes  Obtain history from someone other than the patient: yes  Review and summarize past medical records: yes  Discuss the patient with other providers: yes  Independent visualization of images, tracings, or specimens: yes    Risk of Complications, Morbidity, and/or Mortality  Presenting problems: moderate  Diagnostic procedures: low  Management options: moderate    Patient Progress  Patient progress: stable      Disposition  Final diagnoses:   EDUARDO (acute kidney injury) ([facility] )   CKD (chronic kidney disease)   Transplanted kidney   Uremia     Time reflects when diagnosis was documented in both MDM as applicable and the Disposition within this note     Time User Action Codes Description Comment    9/9/2019  6:59 PM Brett Mckeon Add [N17 9] EDUARDO (acute kidney injury) ([facility] )     9/9/2019  6:59 PM Brett Mckeon Add [N18 9] CKD (chronic kidney disease)     9/9/2019  6:59 PM Brett Mckeon Add [Z94 0] Transplanted kidney     9/9/2019  6:59 PM Brett Mckeon Add [N19] Uremia       ED Disposition     ED Disposition Condition Date/Time Comment    Admit Stable Mon Sep 9, 2019  7:01 PM Case was discussed with AJAY and the patient's admission status was agreed to be Admission Status: inpatient status to the service of Dr Fontaine Aschoff  Follow-up Information    None         Patient's Medications   Discharge Prescriptions    No medications on file     No discharge procedures on file      ED Provider  Attending physically available and evaluated Kathrin Hammans I managed the patient along with the ED Attending      Electronically Signed by         Argentina Irwin MD  09/09/19 2021

## 2019-09-10 PROBLEM — G93.41 ACUTE METABOLIC ENCEPHALOPATHY: Status: ACTIVE | Noted: 2019-09-10

## 2019-09-10 LAB
ANION GAP SERPL CALCULATED.3IONS-SCNC: 10 MMOL/L (ref 4–13)
BASOPHILS # BLD AUTO: 0.17 THOUSANDS/ΜL (ref 0–0.1)
BASOPHILS NFR BLD AUTO: 2 % (ref 0–1)
BUN SERPL-MCNC: 78 MG/DL (ref 5–25)
CALCIUM SERPL-MCNC: 6.9 MG/DL (ref 8.3–10.1)
CHLORIDE SERPL-SCNC: 111 MMOL/L (ref 100–108)
CHLORIDE UR-SCNC: 46 MMOL/L
CK SERPL-CCNC: 71 U/L (ref 26–192)
CO2 SERPL-SCNC: 15 MMOL/L (ref 21–32)
CREAT SERPL-MCNC: 4.15 MG/DL (ref 0.6–1.3)
EOSINOPHIL # BLD AUTO: 0.98 THOUSAND/ΜL (ref 0–0.61)
EOSINOPHIL NFR BLD AUTO: 14 % (ref 0–6)
ERYTHROCYTE [DISTWIDTH] IN BLOOD BY AUTOMATED COUNT: 16.3 % (ref 11.6–15.1)
FRUCTOSAMINE SERPL-SCNC: 234 UMOL/L (ref 0–285)
GFR SERPL CREATININE-BSD FRML MDRD: 11 ML/MIN/1.73SQ M
GLUCOSE SERPL-MCNC: 134 MG/DL (ref 65–140)
GLUCOSE SERPL-MCNC: 142 MG/DL (ref 65–140)
GLUCOSE SERPL-MCNC: 142 MG/DL (ref 65–140)
GLUCOSE SERPL-MCNC: 150 MG/DL (ref 65–140)
GLUCOSE SERPL-MCNC: 162 MG/DL (ref 65–140)
HCT VFR BLD AUTO: 25.5 % (ref 34.8–46.1)
HGB BLD-MCNC: 7.6 G/DL (ref 11.5–15.4)
IMM GRANULOCYTES # BLD AUTO: 0.08 THOUSAND/UL (ref 0–0.2)
IMM GRANULOCYTES NFR BLD AUTO: 1 % (ref 0–2)
LYMPHOCYTES # BLD AUTO: 0.98 THOUSANDS/ΜL (ref 0.6–4.47)
LYMPHOCYTES NFR BLD AUTO: 14 % (ref 14–44)
MCH RBC QN AUTO: 29.1 PG (ref 26.8–34.3)
MCHC RBC AUTO-ENTMCNC: 29.8 G/DL (ref 31.4–37.4)
MCV RBC AUTO: 98 FL (ref 82–98)
MONOCYTES # BLD AUTO: 0.61 THOUSAND/ΜL (ref 0.17–1.22)
MONOCYTES NFR BLD AUTO: 9 % (ref 4–12)
NEUTROPHILS # BLD AUTO: 4.16 THOUSANDS/ΜL (ref 1.85–7.62)
NEUTS SEG NFR BLD AUTO: 60 % (ref 43–75)
NRBC BLD AUTO-RTO: 0 /100 WBCS
PLATELET # BLD AUTO: 207 THOUSANDS/UL (ref 149–390)
PMV BLD AUTO: 13.6 FL (ref 8.9–12.7)
POTASSIUM SERPL-SCNC: 3.9 MMOL/L (ref 3.5–5.3)
POTASSIUM UR-SCNC: 17.5 MMOL/L
RBC # BLD AUTO: 2.61 MILLION/UL (ref 3.81–5.12)
SODIUM 24H UR-SCNC: 74 MOL/L
SODIUM SERPL-SCNC: 136 MMOL/L (ref 136–145)
TACROLIMUS BLD-MCNC: 5.1 NG/ML (ref 2–20)
WBC # BLD AUTO: 6.98 THOUSAND/UL (ref 4.31–10.16)

## 2019-09-10 PROCEDURE — 87205 SMEAR GRAM STAIN: CPT | Performed by: STUDENT IN AN ORGANIZED HEALTH CARE EDUCATION/TRAINING PROGRAM

## 2019-09-10 PROCEDURE — 82550 ASSAY OF CK (CPK): CPT | Performed by: INTERNAL MEDICINE

## 2019-09-10 PROCEDURE — G8987 SELF CARE CURRENT STATUS: HCPCS

## 2019-09-10 PROCEDURE — 85025 COMPLETE CBC W/AUTO DIFF WBC: CPT | Performed by: INTERNAL MEDICINE

## 2019-09-10 PROCEDURE — 97167 OT EVAL HIGH COMPLEX 60 MIN: CPT

## 2019-09-10 PROCEDURE — 80048 BASIC METABOLIC PNL TOTAL CA: CPT | Performed by: INTERNAL MEDICINE

## 2019-09-10 PROCEDURE — 82948 REAGENT STRIP/BLOOD GLUCOSE: CPT

## 2019-09-10 PROCEDURE — NC001 PR NO CHARGE: Performed by: INTERNAL MEDICINE

## 2019-09-10 PROCEDURE — 99223 1ST HOSP IP/OBS HIGH 75: CPT | Performed by: INTERNAL MEDICINE

## 2019-09-10 PROCEDURE — G8988 SELF CARE GOAL STATUS: HCPCS

## 2019-09-10 PROCEDURE — 99232 SBSQ HOSP IP/OBS MODERATE 35: CPT | Performed by: INTERNAL MEDICINE

## 2019-09-10 PROCEDURE — 80197 ASSAY OF TACROLIMUS: CPT | Performed by: INTERNAL MEDICINE

## 2019-09-10 PROCEDURE — 97535 SELF CARE MNGMENT TRAINING: CPT

## 2019-09-10 RX ADMIN — HEPARIN SODIUM 5000 UNITS: 5000 INJECTION, SOLUTION INTRAVENOUS; SUBCUTANEOUS at 13:26

## 2019-09-10 RX ADMIN — SERTRALINE HYDROCHLORIDE 200 MG: 100 TABLET ORAL at 08:47

## 2019-09-10 RX ADMIN — SODIUM BICARBONATE 125 ML/HR: 84 INJECTION, SOLUTION INTRAVENOUS at 20:13

## 2019-09-10 RX ADMIN — ARIPIPRAZOLE 30 MG: 15 TABLET ORAL at 22:23

## 2019-09-10 RX ADMIN — METOPROLOL TARTRATE 50 MG: 50 TABLET, FILM COATED ORAL at 08:49

## 2019-09-10 RX ADMIN — DULOXETINE HYDROCHLORIDE 60 MG: 60 CAPSULE, DELAYED RELEASE ORAL at 17:34

## 2019-09-10 RX ADMIN — HEPARIN SODIUM 5000 UNITS: 5000 INJECTION, SOLUTION INTRAVENOUS; SUBCUTANEOUS at 22:23

## 2019-09-10 RX ADMIN — SODIUM BICARBONATE 125 ML/HR: 84 INJECTION, SOLUTION INTRAVENOUS at 09:17

## 2019-09-10 RX ADMIN — CEFEPIME HYDROCHLORIDE 500 MG: 1 INJECTION, POWDER, FOR SOLUTION INTRAMUSCULAR; INTRAVENOUS at 17:35

## 2019-09-10 RX ADMIN — SODIUM BICARBONATE 650 MG TABLET 1300 MG: at 20:17

## 2019-09-10 RX ADMIN — HYDRALAZINE HYDROCHLORIDE 50 MG: 50 TABLET ORAL at 22:21

## 2019-09-10 RX ADMIN — HEPARIN SODIUM 5000 UNITS: 5000 INJECTION, SOLUTION INTRAVENOUS; SUBCUTANEOUS at 05:30

## 2019-09-10 RX ADMIN — SEVELAMER HYDROCHLORIDE 800 MG: 800 TABLET, FILM COATED PARENTERAL at 08:47

## 2019-09-10 RX ADMIN — INSULIN LISPRO 1 UNITS: 100 INJECTION, SOLUTION INTRAVENOUS; SUBCUTANEOUS at 08:48

## 2019-09-10 RX ADMIN — SEVELAMER HYDROCHLORIDE 800 MG: 800 TABLET, FILM COATED PARENTERAL at 17:34

## 2019-09-10 RX ADMIN — DULOXETINE HYDROCHLORIDE 60 MG: 60 CAPSULE, DELAYED RELEASE ORAL at 08:47

## 2019-09-10 RX ADMIN — CLONIDINE HYDROCHLORIDE 0.3 MG: 0.1 TABLET ORAL at 15:39

## 2019-09-10 RX ADMIN — INSULIN LISPRO 1 UNITS: 100 INJECTION, SOLUTION INTRAVENOUS; SUBCUTANEOUS at 13:27

## 2019-09-10 RX ADMIN — PREDNISONE 5 MG: 5 TABLET ORAL at 08:48

## 2019-09-10 RX ADMIN — TACROLIMUS 3 MG: 1 CAPSULE ORAL at 08:49

## 2019-09-10 RX ADMIN — ROPINIROLE HYDROCHLORIDE 0.25 MG: 0.25 TABLET, FILM COATED ORAL at 08:50

## 2019-09-10 RX ADMIN — AMLODIPINE BESYLATE 10 MG: 10 TABLET ORAL at 08:49

## 2019-09-10 RX ADMIN — SODIUM BICARBONATE 650 MG TABLET 1300 MG: at 17:34

## 2019-09-10 RX ADMIN — CLONIDINE HYDROCHLORIDE 0.3 MG: 0.1 TABLET ORAL at 05:29

## 2019-09-10 RX ADMIN — DOXAZOSIN 2 MG: 2 TABLET ORAL at 22:24

## 2019-09-10 RX ADMIN — ASPIRIN 81 MG 81 MG: 81 TABLET ORAL at 08:48

## 2019-09-10 RX ADMIN — SODIUM BICARBONATE 650 MG TABLET 1300 MG: at 08:47

## 2019-09-10 RX ADMIN — HYDRALAZINE HYDROCHLORIDE 50 MG: 50 TABLET ORAL at 05:29

## 2019-09-10 RX ADMIN — METOPROLOL TARTRATE 50 MG: 50 TABLET, FILM COATED ORAL at 20:18

## 2019-09-10 RX ADMIN — SEVELAMER HYDROCHLORIDE 800 MG: 800 TABLET, FILM COATED PARENTERAL at 13:26

## 2019-09-10 RX ADMIN — LEVOTHYROXINE SODIUM 125 MCG: 125 TABLET ORAL at 05:29

## 2019-09-10 RX ADMIN — FOLIC ACID 1000 MCG: 1 TABLET ORAL at 08:47

## 2019-09-10 RX ADMIN — ROPINIROLE HYDROCHLORIDE 0.25 MG: 0.25 TABLET, FILM COATED ORAL at 17:35

## 2019-09-10 RX ADMIN — CLONIDINE HYDROCHLORIDE 0.3 MG: 0.1 TABLET ORAL at 22:22

## 2019-09-10 RX ADMIN — HYDRALAZINE HYDROCHLORIDE 50 MG: 50 TABLET ORAL at 15:39

## 2019-09-10 RX ADMIN — TACROLIMUS 2 MG: 1 CAPSULE ORAL at 22:25

## 2019-09-10 NOTE — ASSESSMENT & PLAN NOTE
Suspect recurrent UTI  Prior culture showed pansensitive E coli    No urine culture were sent  Continue with IV cefepime

## 2019-09-10 NOTE — ASSESSMENT & PLAN NOTE
Patient was recommended to retry Revlimid 5 mg every other day 3 weeks on and 1 week off    Patient takes it every other day  Next dose tomorrow

## 2019-09-10 NOTE — ASSESSMENT & PLAN NOTE
Patient is status post kidney and pancreatic transplant in 1998  Continue Prograf and steroids  Nephrology consulted for further management  Follow on Prograf level

## 2019-09-10 NOTE — SOCIAL WORK
Cm met with patient to explain Cm role and discuss discharge planning  Patient lives alone in 1st floor apartment with 3STE  Patient's emergency contact is her mother Tevin Moseley, [phone]  Patient's dad Rivka Lowry is POA; patient does not have living will  Patient reported being independent with ADLs PTA, uses cane to ambulate & RW on "bad days", has shower chair, drives, and is on disability  Patient reported history with SNF but is unable to remember facility, reported history with [facility], reported seeing psychiatrist who prescribes meds for depression and anxiety (reported IP psych admission 4-5 years ago), and denied ETOH treatment in the past   Pharmacy: [facility] [address] [facility]  PCP: Dr Hola Rojas  Patient's parents to transport home once stable  Cm following  CM reviewed d/c planning process including the following: identifying help at home, patient preference for d/c planning needs, Discharge Lounge, [facility] Meds to Bed program, availability of treatment team to discuss questions or concerns patient and/or family may have regarding understanding medications and recognizing signs and symptoms once discharged  CM also encouraged patient to follow up with all recommended appointments after discharge  Patient advised of importance for patient and family to participate in managing patients medical well being

## 2019-09-10 NOTE — ASSESSMENT & PLAN NOTE
With metabolic acidosis  Likely due to ATN or prerenal azotemia from dehydration  Received IV fluid bolus in the ED    Currently on bicarb  drip  Nephrology consulted for further management

## 2019-09-10 NOTE — ASSESSMENT & PLAN NOTE
Suspect recurrent UTI  Prior culture showed pansensitive E coli  Due to her recent hospitalization will place on cefepime and narrow spectrum as urine culture results    Monitor WBC and temps

## 2019-09-10 NOTE — ASSESSMENT & PLAN NOTE
Lab Results   Component Value Date    HGBA1C 5 1 09/09/2019       Recent Labs     09/09/19  2109 09/10/19  0712   POCGLU 101 162*       Blood Sugar Average: Last 72 hrs:  (P) 131 5 takes 1 unit of toujeo a daily at home  Diabetic diet/renal diet  Sliding scale coverage

## 2019-09-10 NOTE — UTILIZATION REVIEW
Initial Clinical Review    Admission: Date/Time/Statement: Inpatient Admission Orders (From admission, onward)     Ordered        09/09/19 1922  Inpatient Admission  Once                   Orders Placed This Encounter   Procedures    Inpatient Admission     Standing Status:   Standing     Number of Occurrences:   1     Order Specific Question:   Admitting Physician     Answer:   Bonita Marie [[id number]]     Order Specific Question:   Level of Care     Answer:   Med Surg [16]     Order Specific Question:   Estimated length of stay     Answer:   More than 2 Midnights     Order Specific Question:   Certification     Answer:   I certify that inpatient services are medically necessary for this patient for a duration of greater than two midnights  See H&P and MD Progress Notes for additional information about the patient's course of treatment  ED Arrival Information     Expected Arrival Acuity Means of Arrival Escorted By Service Admission Type    - 9/9/2019 16:20 Urgent Walk-In Self General Medicine Urgent    Arrival Complaint    weakness after kidney treatment        Chief Complaint   Patient presents with    Abnormal Lab     told creatinine was 4 4 and suspecious of UTI by pcp  patient states she has hx of multiple myeloma      Assessment/Plan: Eri Bourgeois is a 53 y o  female who presents with altered mental status  The patient has a history of recurrent UTIs with a renal transplant  She had outpatient labs done which showed an elevation in her creatinine  Lightheadedness dizziness  Denies any fever, chills  Does reports some nausea with poor appetite and oral intake  Denies any abdominal pain or back pain  Denies any hematuria  She does report compliance with her medications  She was evaluated ED and found to have UTI, acute renal failure, acidosis  ADMITTED TO INPATIENT STATUS       * UTI (urinary tract infection)  Assessment & Plan  Suspect recurrent UTI    Prior culture showed pansensitive E coli   Due to her recent hospitalization will place on cefepime and narrow spectrum as urine culture results  Monitor WBC and temps     Acute renal failure superimposed on stage 4 chronic kidney disease (HCC)  Assessment & Plan  With acidosis, likely secondary to acute renal failure  Likely due to ATN or prerenal azotemia from dehydration  Received IV fluid bolus in the ED  Start bicarbonate drip based on Nephrology recommendations  Recheck blood gas at 10:00 p m  Tonight  Check tacrolimus level in a m      Multiple myeloma not having achieved remission [facility])  Assessment & Plan  Holding Revlimid secondary to prior rash     Atrial fibrillation [facility])  Assessment & Plan  Rate controlled with metoprolol     Chronic diastolic congestive heart failure (HCC)  Assessment & Plan      Wt Readings from Last 3 Encounters:   08/23/19 113 kg (250 lb)   08/20/19 113 kg (249 lb)   08/19/19 106 kg (234 lb)      Currently appears volume depleted  IV fluids as above  Hold Lasix secondary to EDUARDO      Anemia  Assessment & Plan  Acute due to chronic kidney disease    Monitor hemoglobin while inpatient  No signs of bleeding at this time     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 [facility])  Assessment & Plan        Lab Results   Component Value Date     HGBA1C 5 1 09/09/2019       Blood Sugar Average: Last 72 hrs:   takes 1 unit of toujeo a daily at home, will restart  Diabetic diet/renal diet  Sliding scale coverage     ED Triage Vitals [09/09/19 1634]   Temperature Pulse Respirations Blood Pressure SpO2   98 °F (36 7 °C) 63 18 (!) 164/49 98 %      Temp Source Heart Rate Source Patient Position - Orthostatic VS BP Location FiO2 (%)   Oral Monitor Sitting Right arm --      Pain Score       6        Wt Readings from Last 1 Encounters:   09/10/19 113 kg (249 lb 1 9 oz)     Additional Vital Signs:   09/10/19 08:47:40    56    136/47Abnormal   77  99 %   09/10/19 0847        136/47Abnormal        09/10/19 07:11:40  97 5 °F (36 4 °C)  53Abnormal   16  150/56  87  98 %   09/10/19 05:16:30    55    154/58  90  100 %   09/09/19 2237  97 2 °F (36 2 °C)Abnormal   62  18  136/49Abnormal   78  95 %   09/09/19 21:12:52  97 1 °F (36 2 °C)Abnormal   59  20  119/88  98  99 %   09/09/19 2041    88  16  132/66    95 %   09/09/19 1830    61  16  136/63    96 %     Pertinent Labs/Diagnostic Test Results:     9/9 US TRANSPLANTED KIDNEY WITH DOPPLER - 1  Elevated arterial resistive indices in the transplant kidney up to 0 8 concerning for possible development of transplant rejection or ATN  Nephrology consult recommended  No hydronephrosis or renal vein thrombosis identified     2  Atrophic native kidneys      Results from last 7 days   Lab Units 09/10/19  0504 09/09/19  2149 09/09/19  0859   WBC Thousand/uL 6 98  --  6 50   HEMOGLOBIN g/dL 7 6*  --  8 2*   HEMATOCRIT % 25 5*  --  28 5*   PLATELETS Thousands/uL 207 201 209   NEUTROS ABS Thousands/µL 4 16  --   --      Results from last 7 days   Lab Units 09/10/19  0504 09/09/19  0859   SODIUM mmol/L 136 134*   POTASSIUM mmol/L 3 9 4 4   CHLORIDE mmol/L 111* 112*   CO2 mmol/L 15* 12*   ANION GAP mmol/L 10 10   BUN mg/dL 78* 85*   CREATININE mg/dL 4 15* 4 42*   EGFR ml/min/1 73sq m 11 11   CALCIUM mg/dL 6 9* 7 6*   MAGNESIUM mg/dL  --  2 4   PHOSPHORUS mg/dL  --  7 7*     Results from last 7 days   Lab Units 09/09/19  0859   AST U/L 23   ALT U/L 26   ALK PHOS U/L 127*   TOTAL PROTEIN g/dL 8 2   ALBUMIN g/dL 2 7*   TOTAL BILIRUBIN mg/dL 0 29     Results from last 7 days   Lab Units 09/10/19  1103 09/10/19  0712 09/09/19  2109   POC GLUCOSE mg/dl 150* 162* 101     Results from last 7 days   Lab Units 09/10/19  0504   GLUCOSE RANDOM mg/dL 142*     Results from last 7 days   Lab Units 09/09/19  0859   HEMOGLOBIN A1C % 5 1   EAG mg/dl 100     Results from last 7 days   Lab Units 09/09/19  2258 09/09/19  1859   PH ANDREY  7 249* 7 159*   PCO2 ANDREY mm Hg 28 9* 32 1*   PO2 ANDREY mm Hg 177 9* 97 8*   HCO3 ANDREY mmol/L 12 4* 11 2*   BASE EXC ANDREY mmol/L -13 6 -16 3   O2 CONTENT ANDREY ml/dL 11 9 12 3   O2 HGB, VENOUS % 96 3* 94 5*     Results from last 7 days   Lab Units 09/10/19  0504   CK TOTAL U/L 71     Results from last 7 days   Lab Units 09/09/19  0859   TSH 3RD GENERATON uIU/mL 2 150         Results from last 7 days   Lab Units 09/09/19  2149   LACTIC ACID mmol/L 0 4*     Results from last 7 days   Lab Units 09/09/19  0859   CLARITY UA  Turbid   COLOR UA  Yellow   SPEC GRAV UA  1 015   PH UA  7 5   GLUCOSE UA mg/dl Negative   KETONES UA mg/dl Negative   BLOOD UA  Negative   PROTEIN UA mg/dl 100 (2+)*   NITRITE UA  Positive*   BILIRUBIN UA  Negative   UROBILINOGEN UA E U /dl 0 2   LEUKOCYTES UA  Large*   WBC UA /hpf Innumerable*   RBC UA /hpf 2-4*   BACTERIA UA /hpf Moderate*   EPITHELIAL CELLS WET PREP /hpf None Seen   CREATININE UR mg/dL 66 6   PROTEIN UR mg/dL 187   PROT/CREAT RATIO UR  2 81*     ED Treatment:   Medication Administration from 09/09/2019 1620 to 09/09/2019 2051    Date/Time Order Dose Route Action   09/09/2019 1859 cefepime (MAXIPIME) 1,000 mg in dextrose 5 % 50 mL IVPB 1,000 mg Intravenous New Bag   09/09/2019 1844 sodium chloride 0 9 % bolus 1,000 mL 1,000 mL Intravenous New Bag   09/09/2019 2025 sodium bicarbonate 8 4 % injection 50 mEq 50 mEq Intravenous Given        Past Medical History:   Diagnosis Date    Abnormal liver function test     Acute kidney injury ([address] )     Acute on chronic congestive heart failure (HCC)     Allergic urticaria     Anemia     Cancer (HCC)     Multiple myeloma    Cervical dysplasia     Cholelithiasis     Chronic diastolic (congestive) heart failure ([address] ) 9/18/2017    Diabetes mellitus (HCC)     Previous, controlled with diet    Diabetes mellitus with foot ulcer ([address] )     Disease of thyroid gland     Encephalopathy     Hematuria     + leak est- secondary to UTIs/panc drainage    History of transfusion     Hyperkalemia     Hypertension     Iliotibial band syndrome     Lumbar radiculopathy     Multiple myeloma (HCC)     Multiple myeloma (HCC)     Night blindness     Nonrheumatic aortic (valve) insufficiency     Pneumonia     Renal disorder     Retinopathy     Seborrhea     Seizure ([facility] )     Shingles     Sinus tachycardia     B blocker - cardio echo stress test 02 normal/neg LE doppler 2/02 OK and 12/07    Status post simultaneous kidney and pancreas transplant ([facility] )     Toe amputation status ([facility])     Trochanteric bursitis      Present on Admission:   Acute renal failure superimposed on stage 3 chronic kidney disease ([facility])   Anemia   Atrial fibrillation ([facility])   Chronic diastolic congestive heart failure ([facility])   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 ([place] [address] )   Multiple myeloma not having achieved remission ([facility])   Essential hypertension    Admitting Diagnosis: Transplanted kidney [Z94 0]  Abnormal labor [O62 9]  Uremia [N19]  CKD (chronic kidney disease) [N18 9]  EDUARDO (acute kidney injury) ([place] [address] ) [N17 9]     Age/Sex: 53 y o  female     Admission Orders:    Current Facility-Administered Medications:  acetaminophen 650 mg Oral Q6H PRN    amLODIPine 10 mg Oral Daily    ARIPiprazole 30 mg Oral HS    aspirin 81 mg Oral Daily    cefepime 500 mg Intravenous Q24H    cloNIDine 0 3 mg Oral Q8H ARUNA    doxazosin 2 mg Oral HS    DULoxetine 60 mg Oral BID    folic acid 8,072 mcg Oral Daily    heparin (porcine) 5,000 Units Subcutaneous Q8H [address]    hydrALAZINE 50 mg Oral Q8H [address]    insulin lispro 1-6 Units Subcutaneous TID AC    insulin lispro 1-6 Units Subcutaneous HS    levothyroxine 125 mcg Oral Daily    LORazepam 1 mg Oral TID PRN    metoprolol tartrate 50 mg Oral Q12H ARUNA    predniSONE 5 mg Oral Daily    rOPINIRole 0 25 mg Oral BID    sertraline 200 mg Oral Daily    sevelamer 800 mg Oral TID With Meals    sodium bicarbonate infusion 125 mL/hr Intravenous Continuous Last Rate: 125 mL/hr (09/10/19 0917)   sodium bicarbonate 1,300 mg Oral TID    tacrolimus 2 mg Oral HS    tacrolimus 3 mg Oral QAM      SCDs  OOB AS STEPHANIE   POC GLUCOSE AC/HS WITH SSI COVERAGE   URINE CULTURE  TACROLIMIS LEVEL   ADA DIET, FLUID RESTRICTION   IP CONSULT TO NEPHROLOGY  IP CONSULT TO CASE MANAGEMENT      Network Utilization Review Department  Phone: [phone]; Fax [phone]  [email or web address]  ATTENTION: Please call with any questions or concerns to [phone]  and carefully listen to the prompts so that you are directed to the right person  Send all requests for admission clinical reviews, approved or denied determinations and any other requests to fax [phone]   All voicemails are confidential

## 2019-09-10 NOTE — PROGRESS NOTES
Progress Note - Harshal Levy 1965, 53 y o  female MRN: [id number]    [facility] En[facility]: [id number]    Primary Care Provider: Kaushal Cintron MD   Date and time admitted to hospital: 9/9/2019  4:37 PM        Acute renal failure superimposed on stage 3 chronic kidney disease ([address] )  Assessment & Plan  With metabolic acidosis  Likely due to ATN or prerenal azotemia from dehydration  Received IV fluid bolus in the ED  Currently on bicarb  drip  Nephrology consulted for further management    * UTI (urinary tract infection)  Assessment & Plan  Suspect recurrent UTI  Prior culture showed pansensitive E coli  No urine culture were sent  Continue with IV cefepime      Acute metabolic encephalopathy  Assessment & Plan  POA  Likely secondary to above  Continue to monitor    Multiple myeloma not having achieved remission [facility])  Assessment & Plan  Patient was recommended to retry Revlimid 5 mg every other day 3 weeks on and 1 week off  Patient takes it every other day  Next dose tomorrow    Essential hypertension  Assessment & Plan  Acceptable BP  Continue amlodipine, metoprolol, clonidine, doxazosin and hydralazine      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 [facility])  Assessment & Plan  Lab Results   Component Value Date    HGBA1C 5 1 09/09/2019       Recent Labs     09/09/19  2109 09/10/19  0712   POCGLU 101 162*       Blood Sugar Average: Last 72 hrs:  (P) 131 5 takes 1 unit of toujeo a daily at home  Diabetic diet/renal diet  Sliding scale coverage    Renal transplant recipient  Assessment & Plan  Patient is status post kidney and pancreatic transplant in 1998  Continue Prograf and steroids  Nephrology consulted for further management  Follow on Prograf level      VTE Pharmacologic Prophylaxis:   Pharmacologic: Heparin  Mechanical VTE Prophylaxis in Place: Yes    Patient Centered Rounds: I have performed bedside rounds with nursing staff today      Discussions with Specialists or Other Care Team Provider:     Education and Discussions with Family / Patient:  Patient    Time Spent for Care: 30 minutes  More than 50% of total time spent on counseling and coordination of care as described above  Current Length of Stay: 1 day(s)    Current Patient Status: Inpatient   Certification Statement: The patient will continue to require additional inpatient hospital stay due to Above    Discharge Plan / Estimated Discharge Date: TBD    Code Status: Level 1 - Full Code      Subjective:   Patient seen and examined  Still with difficulty urinating  No chest pain or shortness of breath    Objective:     Vitals:   Temp (24hrs), Av 5 °F (36 4 °C), Min:97 1 °F (36 2 °C), Max:98 °F (36 7 °C)    Temp:  [97 1 °F (36 2 °C)-98 °F (36 7 °C)] 97 5 °F (36 4 °C)  HR:  [53-88] 56  Resp:  [16-20] 16  BP: (119-164)/(47-88) 136/47  SpO2:  [95 %-100 %] 99 %  Body mass index is 37 88 kg/m²  Input and Output Summary (last 24 hours): Intake/Output Summary (Last 24 hours) at 9/10/2019 0943  Last data filed at 9/10/2019 0916  Gross per 24 hour   Intake 1020 ml   Output 200 ml   Net 820 ml       Physical Exam:     Physical Exam  Patient is awake alert in no acute distress  Lung clear to auscultation bilateral  Heart positive S1-S2 no murmur  Abdomen soft nontender  Lower extremities no edema    Additional Data:     Labs:    Results from last 7 days   Lab Units 09/10/19  0504   WBC Thousand/uL 6 98   HEMOGLOBIN g/dL 7 6*   HEMATOCRIT % 25 5*   PLATELETS Thousands/uL 207   NEUTROS PCT % 60   LYMPHS PCT % 14   MONOS PCT % 9   EOS PCT % 14*     Results from last 7 days   Lab Units 09/10/19  0504 19  0859   POTASSIUM mmol/L 3 9 4 4   CHLORIDE mmol/L 111* 112*   CO2 mmol/L 15* 12*   BUN mg/dL 78* 85*   CREATININE mg/dL 4 15* 4 42*   CALCIUM mg/dL 6 9* 7 6*   ALK PHOS U/L  --  127*   ALT U/L  --  26   AST U/L  --  23           * I Have Reviewed All Lab Data Listed Above    * Additional Pertinent Lab Tests [facility]ed: [facility]    Imaging:    Imaging Reports Reviewed Today Include:   Imaging Personally Reviewed by Myself Includes:      Recent Cultures (last 7 days):           Last 24 Hours Medication List:     Current Facility-Administered Medications:  acetaminophen 650 mg Oral Q6H PRN Dashawn Mcdermott MD    amLODIPine 10 mg Oral Daily Dashawn Mcdermott MD    ARIPiprazole 30 mg Oral HS Dashawn Mcdermott MD    aspirin 81 mg Oral Daily Dashawn Mcdermott, MD    cefepime 500 mg Intravenous Q24H Dashawn Mcdermott MD    cloNIDine 0 3 mg Oral Q8H [address] Dashawn Mcdermott MD    doxazosin 2 mg Oral HS Dashawn Mcdermott MD    DULoxetine 60 mg Oral BID Dashawn Mcdermott MD    folic acid 3,821 mcg Oral Daily Dashawn Mcdermott MD    heparin (porcine) 5,000 Units Subcutaneous Q8H [address] Dashawn Mcdermott MD    hydrALAZINE 50 mg Oral Q8H Lazarus Glenn, MD    insulin lispro 1-6 Units Subcutaneous TID AC Dashawn Mcdermott MD    insulin lispro 1-6 Units Subcutaneous HS Dashawn Mcdermott MD    levothyroxine 125 mcg Oral Daily Dashawn Mcdermott MD    LORazepam 1 mg Oral TID PRN Dashawn Mcdermott MD    metoprolol tartrate 50 mg Oral Q12H Lazarus Glenn, MD    predniSONE 5 mg Oral Daily Dashawn Mcdermott MD    rOPINIRole 0 25 mg Oral BID Dashawn Mcdermott MD    sertraline 200 mg Oral Daily Dashawn Mcdermott, MD    sevelamer 800 mg Oral TID With Meals Dashawn Mcdermott MD    sodium bicarbonate infusion 125 mL/hr Intravenous Continuous Yeison Cage MD Last Rate: 125 mL/hr (09/10/19 0917)   sodium bicarbonate 1,300 mg Oral TID Dashawn Mcdermott MD    tacrolimus 2 mg Oral HS Dashawn Mcdermott MD    tacrolimus 3 mg Oral QAEDGAR Mcdermott MD         Today, Patient Was Seen By: Chanda Bailey DO    ** Please Note: This note has been constructed using a voice recognition system   **

## 2019-09-10 NOTE — NURSING NOTE
Spoke to [facility] for a telemetry box and was notified that there were none available at this time  Patient placed on the waiting list for telemetry  Notified supervisor that patient was sent up from ED without telemetry and that there are none available  Notified primary nurse Lamberto Reilly

## 2019-09-10 NOTE — OCCUPATIONAL THERAPY NOTE
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 ([address] )    Essential hypertension    Anemia    Chronic diastolic congestive heart failure (HCC)    Atrial fibrillation (HCC)    Multiple myeloma not having achieved remission ([address] )    Acute renal failure superimposed on stage 3 chronic kidney disease ([address] )    Acute metabolic encephalopathy    Past Medical History  Past Medical History:   Diagnosis Date    Abnormal liver function test     Acute kidney injury ([address] )     Acute on chronic congestive heart failure (HCC)     Allergic urticaria     Anemia     Cancer (HCC)     Multiple myeloma    Cervical dysplasia     Cholelithiasis     Chronic diastolic (congestive) heart failure ([address] ) 9/18/2017    Diabetes mellitus (HCC)     Previous, controlled with diet    Diabetes mellitus with foot ulcer ([address] )     Disease of thyroid gland     Encephalopathy     Hematuria     + leak est- secondary to UTIs/panc drainage    History of transfusion     Hyperkalemia     Hypertension     Iliotibial band syndrome     Lumbar radiculopathy     Multiple myeloma (HCC)     Multiple myeloma (HCC)     Night blindness     Nonrheumatic aortic (valve) insufficiency     Pneumonia     Renal disorder     Retinopathy     Seborrhea     Seizure ([address] )     Shingles     Sinus tachycardia     B blocker - cardio echo stress test 02 normal/neg LE doppler 2/02 OK and 12/07    Status post simultaneous kidney and pancreas transplant ([address] )     Toe amputation status (HCC)     Trochanteric bursitis      Past Surgical History  Past Surgical History:   Procedure Laterality Date    CATARACT EXTRACTION      CHOLECYSTECTOMY      COLONOSCOPY      two polyps in the rectum removed and biopsied diverticulosis in the sigmoid colon, external hemorrhoiods- Dr Barfield    COMBINED KIDNEY-PANCREAS TRANSPLANT N/A     CT BONE MARROW BIOPSY AND ASPIRATION  4/17/2019    CYSTOSCOPY N/A 10/13/2016    Procedure: CYSTOSCOPY, retrograde pyelogram, biopsy of ureteral polyp; Surgeon: Madonna Kearney MD;  [facility];  Service:    Katerin Marcial DILATION AND CURETTAGE OF UTERUS      ESOPHAGOGASTRODUODENOSCOPY N/A 11/20/2017    Procedure: ESOPHAGOGASTRODUODENOSCOPY (EGD); Surgeon: Vivian Ledezma DO;  [facility]: [facility];   Service: Gastroenterology    EYE SURGERY      cataracts    FOOT AMPUTATION THROUGH METATARSAL Left     FOOT SURGERY Right     excision of metatarsal heads    HALLUX VALGUS CORRECTION Right     NEPHRECTOMY TRANSPLANTED ORGAN      PANCREATIC TRANSPLANT REMOVAL  1998         09/10/19 1330   Note Type   Note type Eval/Treat   Restrictions/Precautions   Weight Bearing Precautions Per Order No   Other Precautions Fall Risk;Telemetry  (+Left sneaker orthotic 2* to history of transmetarsal amputation to LLE)   Pain Assessment   Pain Assessment No/denies pain   Pain Score No Pain   Home Living   Type of Home Apartment   Home Layout One level  (2 JESSI)   Bathroom Shower/Tub Tub/shower unit   Bathroom Toilet Standard   Bathroom Equipment   (grab bars for tub bath-sits in tub to bathe)   Bathroom Accessibility Accessible via walker   Home Equipment Walker;Cane;Grab bars   Prior Function   Level of [place] Independent with ADLs and functional mobility   Lives With Alone   Receives Help From Family   ADL Assistance Independent   IADLs Independent   Falls in the last 6 months 0   Vocational On disability   Lifestyle   Autonomy I ADL's/IADl's, +RW household, +SPC community, +drives during the day   Reciprocal Relationships parents   Service to Others on disability   Intrinsic Gratification art/crafts, coloring   Psychosocial   Psychosocial (WDL) WDL   Subjective   Subjective pleasant and motivated for therapy ADL   Where Assessed Edge of bed   Eating Assistance 7  Independent   Grooming Assistance 7  Independent   Grooming Deficit Brushing hair   UB Bathing Assistance 5  Supervision/Setup   UB Bathing Deficit Setup   LB Bathing Assistance 4  Minimal Assistance   LB Bathing Deficit Setup;Steadying   UB Dressing Assistance 5  Supervision/Setup   LB Dressing Assistance 5  Supervision/Setup   LB Dressing Deficit Steadying  (Set-up to don socks/tie shoes with crossed over leg method)   Toileting Assistance  4  Minimal Assistance   Toileting Deficit Steadying   Bed Mobility   Supine to Sit 5  Supervision   Additional items Assist x 1   Transfers   Sit to Stand 4  Minimal assistance   Additional items Assist x 1   Stand to Sit 4  Minimal assistance   Additional items Assist x 1   Toilet transfer 4  Minimal assistance   Additional items Standard toilet;Assist x 1  (min a for CG simulated standard toilet verbal cues )   Functional Mobility   Functional Mobility 4  Minimal assistance   Additional Comments min a with RW bed<>bathroom, min unsteady, verbal cues for RW management (stepping outside of RW while rushing to get ringing phone)   Additional items Rolling walker   Balance   Static Sitting Good   Dynamic Sitting Fair +   Static Standing Fair   Dynamic Standing Fair -   Ambulatory Poor +   Activity Tolerance   Activity Tolerance Patient limited by fatigue   Nurse Made Aware RN cleared pt for therapy   RUE Assessment   RUE Assessment WFL   LUE Assessment   LUE Assessment WFL   Hand Function   Gross Motor Coordination Functional   Fine Motor Coordination Functional   Vision-Basic Assessment   Current Vision Wears glasses all the time   Visual History   (diabetic retinopathy per pt-night blindness)   Vision - Complex Assessment   Acuity Able to read employee name badge without difficulty   Cognition   Overall Cognitive Status Impaired   Arousal/Participation Alert; Cooperative   Attention Within functional limits   Orientation Level Oriented X4   Memory Decreased recall of recent events;Decreased recall of precautions   Following Commands Follows multistep commands with increased time or repetition   Comments min slow processing/occassionally forgetful -pt acknowledged and stating "I am confused for 3-4 days after a UTI this is normal"  (Continue to assess 2* to living alone and driving)   Assessment   Limitation Decreased ADL status; Decreased cognition;Decreased endurance;Decreased self-care trans;Decreased high-level ADLs   Prognosis Good   Assessment Pt is a 53 y o  female who was admitted to [facility] on 9/9/2019 with altered mental status UTI (urinary tract infection) , CKD stage 4, multiple myeloma +chemo, a-fib, CHF, anemia, diabetes type 1, TMA left foot, history of renal and pancreas transplant   Pt's problem list also includes PMH of disease of thyroid, HTN, night blindness, encephalopathy, anemia, cancer, CHF, abnormal liver function test, lumbar radiculopathy, shingles   At baseline pt was completing I with ADL's/IADL's, +RW with functional ambulation, +SPC with community distances, +drives during the day  Pt lives alone in a 1st floor apartment with 2 JESSI  Currently pt requires S/set-up for overall ADLS and min a with RW for functional mobility/transfers-verbal cues for safety  Pt currently presents with impairments in the following categories -steps to enter environment, limited home support, difficulty performing ADLS and difficulty performing IADLS  activity tolerance, endurance, standing balance/tolerance, memory, safety  and judgement    These impairments, as well as pt's fatigue, decreased caregiver support and risk for falls  limit pt's ability to safely engage in all baseline areas of occupation, includingbathing, dressing, toileting, functional mobility/transfers, community mobility, laundry , driving, house maintenance, medication management, meal prep and cleaning From OT standpoint, recommend home OT pending progress with goals/cognitition 2* to patient lives alone upon D/C  OT will continue to follow to address the below stated goals  Goals   Patient Goals go home once i feel better   LTG Time Frame 10-14   Long Term Goal #1 see goals below   Plan   Treatment Interventions ADL retraining;Functional transfer training; Endurance training;Patient/family training; Compensatory technique education; Activityengagement; Energy conservation;Continued evaluation   Goal Expiration Date 09/24/19   Treatment Day 1   OT Frequency 3-5x/wk   Additional Treatment Session   Start Time 1320   End Time 1338   Treatment Assessment Pt seen for an additional skilled OT session with interventions consisting of LB dressing, functional mobility/transfers  Pt demonstrates mildly impaired cognition and safety awareness with evaluation, fatigue and decreased dynamic standing balance  From OT stand point recommend home OT pending goal progress/cognitive status 2* to living alone/driving  Pt continues to function below baseline levels occupational performance remains limited with the above noted deficits  Continue plan of care to maximize functional I with all ADL's/mobility  Recommendation   OT Discharge Recommendation Home OT  (home OT pending goal progress/cognitive status)   OT - OK to Discharge No   Barthel Index   Feeding 10   Bathing 0   Grooming Score 5   Dressing Score 5   Bladder Score 10   Bowels Score 10   Toilet Use Score 5   Transfers (Bed/Chair) Score 10   Mobility (Level Surface) Score 0   Stairs Score 0   Barthel Index Score 55   Modified Chuck Scale   Modified [place] Scale 4      Occupational Therapy Goals:    *Mod I with bed mobility to engage in functional tasks    *Mod I Adl's after setup with use of AE PRN  *Mod I toileting and clothing management   *Mod I functional mobility and transfers to/from all surfaces with Fair + dynamic balance and safety for participation in dynamic adls and iadl tasks   *Demonstrate good carryover with safe use of RW during functional tasks   *Assess DME needs   *Increase activity tolerance to 25-30 minutes for participation in adls and enjoyable activities  *Pt to participate in further cognitive testing with good attention and participation to assist with safe d/c and driving recommendations  *Mod I with Simulated IADL management task    Jean Marie Hyatt MOT, OTR/L

## 2019-09-10 NOTE — PHYSICIAN ADVISOR
Current patient class: Inpatient  The patient is currently on Hospital Day: 2 at [address] [place] [address]      The patient was admitted to the hospital at [facility] on 19 for the following diagnosis:  Transplanted kidney [Z94 0]  Abnormal labor [O62 9]  Uremia [N19]  CKD (chronic kidney disease) [N18 9]  EDUARDO (acute kidney injury) ([address] ) [N17 9]       There is documentation in the medical record of an expected length of stay of at least 2 midnights  The patient is therefore expected to satisfy the 2 midnight benchmark and given the 2 midnight presumption is appropriate for INPATIENT ADMISSION  Given this expectation of a satisfying stay, CMS instructs us that the patient is most often appropriate for inpatient admission under part A provided medical necessity is documented in the chart  After review of the relevant documentation, labs, vital signs and test results, the patient is appropriate for INPATIENT ADMISSION  Admission to the hospital as an inpatient is a complex decision making process which requires the practitioner to consider the patients presenting complaint, history and physical examination and all relevant testing  With this in mind, in this case, the patient was deemed appropriate for INPATIENT ADMISSION  After review of the documentation and testing available at the time of the admission I concur with this clinical determination of medical necessity  Rationale is as follows: The patient is a 53 yrs old Female who presented to the ED at 2019  4:37 PM with a chief complaint of Abnormal Lab (told creatinine was 4 4 and suspecious of UTI by pcp  patient states she has hx of multiple myeloma )     Patient admitted with a report of altered menta status associates with lightheadedness and dizziness  There is also a notation that the patient has had  oral intake  She has a history of CKD, CHF, anemia, DM, multiple myeloma, and renal transplant    Abnormal labs include a BUN of 85, creatinine of 4 42, hemoglobin of 7 6, CO2 of 12, pH of 7 159 and a UA revealing positive nitrites  She was diagnosed as having a UTI, acidosis and EDUARDO  A renal US demonstrated findings consistent with either possible develooment of transplant rejection or ATN  She was evaluated by Nephrology and they agreed with continuation of  Bicarb drip  They also recommended extremely close monitoring of her renal status and the possibility of transplant rejection  With the need for continuing acute care monitoring in this patient with many comorbid conditions, a two night admission class to the hospital would be considered appropriate for her      The patients vitals on arrival were ED Triage Vitals [09/09/19 1634]   Temperature Pulse Respirations Blood Pressure SpO2   98 °F (36 7 °C) 63 18 (!) 164/49 98 %      Temp Source Heart Rate Source Patient Position - Orthostatic VS BP Location FiO2 (%)   Oral Monitor Sitting Right arm --      Pain Score       6           Past Medical History:   Diagnosis Date    Abnormal liver function test     Acute kidney injury ([address] )     Acute on chronic congestive heart failure (HCC)     Allergic urticaria     Anemia     Cancer (HCC)     Multiple myeloma    Cervical dysplasia     Cholelithiasis     Chronic diastolic (congestive) heart failure (HCC) 9/18/2017    Diabetes mellitus (HCC)     Previous, controlled with diet    Diabetes mellitus with foot ulcer ([address] )     Disease of thyroid gland     Encephalopathy     Hematuria     + leak est- secondary to UTIs/panc drainage    History of transfusion     Hyperkalemia     Hypertension     Iliotibial band syndrome     Lumbar radiculopathy     Multiple myeloma (HCC)     Multiple myeloma (HCC)     Night blindness     Nonrheumatic aortic (valve) insufficiency     Pneumonia     Renal disorder     Retinopathy     Seborrhea     Seizure (HCC)     Shingles     Sinus tachycardia     B blocker - cardio echo stress test 02 normal/neg LE doppler 2/02 OK and 12/07    Status post simultaneous kidney and pancreas transplant ([address] )     Toe amputation status (HCC)     Trochanteric bursitis      Past Surgical History:   Procedure Laterality Date    CATARACT EXTRACTION      CHOLECYSTECTOMY      COLONOSCOPY      two polyps in the rectum removed and biopsied diverticulosis in the sigmoid colon, external hemorrhoiods- Dr Barfield    COMBINED KIDNEY-PANCREAS TRANSPLANT N/A     CT BONE MARROW BIOPSY AND ASPIRATION  4/17/2019    CYSTOSCOPY N/A 10/13/2016    Procedure: CYSTOSCOPY, retrograde pyelogram, biopsy of ureteral polyp; Surgeon: Toy Marks MD;  [facility];  Service:    Ronnie Keith DILATION AND CURETTAGE OF UTERUS      ESOPHAGOGASTRODUODENOSCOPY N/A 11/20/2017    Procedure: ESOPHAGOGASTRODUODENOSCOPY (EGD); Surgeon: Catalina Kaufman DO;  [facility]; Service: Gastroenterology    EYE SURGERY      cataracts    FOOT AMPUTATION THROUGH METATARSAL Left     FOOT SURGERY Right     excision of metatarsal heads    HALLUX VALGUS CORRECTION Right     NEPHRECTOMY TRANSPLANTED ORGAN      PANCREATIC TRANSPLANT REMOVAL  1998           Consults have been placed to:   IP CONSULT TO NEPHROLOGY  IP CONSULT TO CASE MANAGEMENT  IP CONSULT TO NEPHROLOGY    Vitals:    09/10/19 0516 09/10/19 0711 09/10/19 0847 09/10/19 0847   BP: 154/58 150/56 (!) 136/47 (!) 136/47   Pulse: 55 (!) 53  56   Resp:  16     Temp:  97 5 °F (36 4 °C)     TempSrc:       SpO2: 100% 98%  99%   Weight:       Height:           Most recent labs:    Recent Labs     09/09/19  0859  09/10/19  0504   WBC 6 50  --  6 98   HGB 8 2*  --  7 6*   HCT 28 5*  --  25 5*      < > 207   K 4 4  --  3 9   CALCIUM 7 6*  --  6 9*   BUN 85*  --  78*   CREATININE 4 42*  --  4 15*   CKTOTAL  --   --  71   AST 23  --   --    ALT 26  --   --    ALKPHOS 127*  --   --     < > = values in this interval not displayed         Scheduled Meds:  Current Facility-Administered Medications:  acetaminophen 650 mg Oral Q6H PRN Demetrius Espinoza MD    amLODIPine 10 mg Oral Daily Demetrius Espinoza MD    ARIPiprazole 30 mg Oral HS Demetrius Espinoza MD    aspirin 81 mg Oral Daily Demetrius Espinoza MD    cefepime 500 mg Intravenous Q24H Demetrius Espinoza MD    cloNIDine 0 3 mg Oral Q8H [facility] Demetrius Espinoza MD    doxazosin 2 mg Oral HS Demetrius Espinoza MD    DULoxetine 60 mg Oral BID Demetrius Espinoza MD    folic acid 2,267 mcg Oral Daily Demetrius Espinoza MD    heparin (porcine) 5,000 Units Subcutaneous [facility] Demetrius Espinoza MD    hydrALAZINE 50 mg Oral Q8H [facility] Demetrius Espinoza MD    insulin lispro 1-6 Units Subcutaneous TID AC Demetrius Espinoza MD    insulin lispro 1-6 Units Subcutaneous HS Demetrius Espinoza MD    levothyroxine 125 mcg Oral Daily Demetrius Espinoza MD    LORazepam 1 mg Oral TID PRN Demetrius Espinoza MD    metoprolol tartrate 50 mg Oral Q12H Tarik Zaragoza MD    predniSONE 5 mg Oral Daily Demetrius Espinoza MD    rOPINIRole 0 25 mg Oral BID Demetrius Espinoza MD    sertraline 200 mg Oral Daily Demetrius Espinoza MD    sevelamer 800 mg Oral TID With Meals Demetrius Espinoza MD    sodium bicarbonate infusion 125 mL/hr Intravenous Continuous Abdirashid Osorio MD Last Rate: 125 mL/hr (09/10/19 0917)   sodium bicarbonate 1,300 mg Oral TID Demetrius Espinoza MD    tacrolimus 2 mg Oral HS Demetrius Espinoza MD    tacrolimus 3 mg Oral QAM Demetrius Espinoza MD      Continuous Infusions:  sodium bicarbonate infusion 125 mL/hr Last Rate: 125 mL/hr (09/10/19 0917)     PRN Meds:   acetaminophen    LORazepam    Surgical procedures (if appropriate):

## 2019-09-10 NOTE — H&P
H&P- Julia Ruth 1965, 53 y o  female MRN: [id number]    Unit/Bed#: ED 15 Encounter: [id number]    Primary Care Provider: Nia Peters MD   Date and time admitted to hospital: 9/9/2019  4:37 PM        * UTI (urinary tract infection)  Assessment & Plan  Suspect recurrent UTI  Prior culture showed pansensitive E coli  Due to her recent hospitalization will place on cefepime and narrow spectrum as urine culture results  Monitor WBC and temps    Acute renal failure superimposed on stage 4 chronic kidney disease (HCC)  Assessment & Plan  With acidosis, likely secondary to acute renal failure  Likely due to ATN or prerenal azotemia from dehydration  Received IV fluid bolus in the ED  Start bicarbonate drip based on Nephrology recommendations  Recheck blood gas at 10:00 p m  Tonight  Check tacrolimus level in a m  Multiple myeloma not having achieved remission [facility])  Assessment & Plan  Holding Revlimid secondary to prior rash    Atrial fibrillation [facility])  Assessment & Plan  Rate controlled with metoprolol    Chronic diastolic congestive heart failure [facility])  Assessment & Plan  Wt Readings from Last 3 Encounters:   08/23/19 113 kg (250 lb)   08/20/19 113 kg (249 lb)   08/19/19 106 kg (234 lb)     Currently appears volume depleted  IV fluids as above  Hold Lasix secondary to EDUARDO        Anemia  Assessment & Plan  Acute due to chronic kidney disease  Monitor hemoglobin while inpatient  No signs of bleeding at this time    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 [facility])  Assessment & Plan  Lab Results   Component Value Date    HGBA1C 5 1 09/09/2019       No results for input(s): POCGLU in the last 72 hours  Blood Sugar Average: Last 72 hrs:   takes 1 unit of toujeo a daily at home, will restart  Diabetic diet/renal diet  Sliding scale coverage          History of Present Illness     HPI:  Julia Ruth is a 53 y o  female who presents with altered mental status    The patient has a history of recurrent UTIs with a renal transplant  She had outpatient labs done which showed an elevation in her creatinine  Lightheadedness dizziness  Denies any fever, chills  Does reports some nausea with poor appetite and oral intake  Denies any abdominal pain or back pain  Denies any hematuria  She does report compliance with her medications  She was evaluated ED and found to have UTI, acute renal failure, acidosis  Review of Systems   All other systems reviewed and are negative        Historical Information   Past Medical History:   Diagnosis Date    Abnormal liver function test     Acute kidney injury ([address] )     Acute on chronic congestive heart failure (HCC)     Allergic urticaria     Anemia     Cancer (HCC)     Multiple myeloma    Cervical dysplasia     Cholelithiasis     Chronic diastolic (congestive) heart failure ([address] ) 9/18/2017    Diabetes mellitus (HCC)     Previous, controlled with diet    Diabetes mellitus with foot ulcer ([address] )     Disease of thyroid gland     Encephalopathy     Hematuria     + leak est- secondary to UTIs/panc drainage    History of transfusion     Hyperkalemia     Hypertension     Iliotibial band syndrome     Lumbar radiculopathy     Multiple myeloma (HCC)     Multiple myeloma (HCC)     Night blindness     Nonrheumatic aortic (valve) insufficiency     Pneumonia     Renal disorder     Retinopathy     Seborrhea     Seizure ([address] )     Shingles     Sinus tachycardia     B blocker - cardio echo stress test 02 normal/neg LE doppler 2/02 OK and 12/07    Status post simultaneous kidney and pancreas transplant ([address] )     Toe amputation status (HCC)     Trochanteric bursitis      Past Surgical History:   Procedure Laterality Date    CATARACT EXTRACTION      CHOLECYSTECTOMY      COLONOSCOPY      two polyps in the rectum removed and biopsied diverticulosis in the sigmoid colon, external hemorrhoiods- Dr Barfield    COMBINED KIDNEY-PANCREAS TRANSPLANT N/A     CT BONE MARROW BIOPSY AND ASPIRATION  2019    CYSTOSCOPY N/A 10/13/2016    Procedure: CYSTOSCOPY, retrograde pyelogram, biopsy of ureteral polyp; Surgeon: Shelley Calderon MD;  Location: [facility];  Service:    Martine Cortes DILATION AND CURETTAGE OF UTERUS      ESOPHAGOGASTRODUODENOSCOPY N/A 2017    Procedure: ESOPHAGOGASTRODUODENOSCOPY (EGD); Surgeon: Karin Traylor DO;  Location: [facility]; Service: Gastroenterology    EYE SURGERY      cataracts    FOOT AMPUTATION THROUGH METATARSAL Left     FOOT SURGERY Right     excision of metatarsal heads    HALLUX VALGUS CORRECTION Right     NEPHRECTOMY TRANSPLANTED ORGAN      PANCREATIC TRANSPLANT REMOVAL       Social History   Social History     Substance and Sexual Activity   Alcohol Use Never    Frequency: Never    Binge frequency: Never    Comment: (history)     Social History     Substance and Sexual Activity   Drug Use Never    Types: Hydrocodone    Comment: Past cocaine use many years ago - no current use     Social History     Tobacco Use   Smoking Status Former Smoker    Last attempt to quit: 2012    Years since quittin 3   Smokeless Tobacco Never Used     Family History: non-contributory    Meds/Allergies   all medications and allergies reviewed  Allergies   Allergen Reactions    Ixazomib Rash     Ninlaro    Revlimid [Lenalidomide] Rash    Cefadroxil     Latex Rash    Morphine Other (See Comments)     Other reaction(s): Other (See Comments)  projectile vomiting    Morphine And Related GI Intolerance    Myrbetriq [Mirabegron] Hives    Penicillins Hives     Other reaction(s): Other (See Comments)  Respiratory Distress,hives  Tolerates cefazolin       Objective   Vitals: Blood pressure 136/63, pulse 61, temperature 98 °F (36 7 °C), temperature source Oral, resp  rate 16, SpO2 96 %        Intake/Output Summary (Last 24 hours) at 2019  Last data filed at 2019  Gross per 24 hour   Intake 50 ml Output    Net 50 ml       Invasive Devices     Peripheral Intravenous Line            Peripheral IV 09/09/19 Right Forearm less than 1 day                Physical Exam   Constitutional: She is oriented to person, place, and time  Obese middle-aged female, sitting up in bed, mildly lethargic   HENT:   Head: Normocephalic and atraumatic  Eyes: Pupils are equal, round, and reactive to light  EOM are normal    Neck: Neck supple  Cardiovascular: Normal rate and regular rhythm  Pulmonary/Chest: Effort normal  She has no wheezes  She has no rales  Abdominal: Soft  Musculoskeletal: She exhibits no edema  Neurological: She is alert and oriented to person, place, and time  Skin: Skin is warm and dry  Capillary refill takes less than 2 seconds  Psychiatric: She has a normal mood and affect  Her behavior is normal        Lab Results: I have personally reviewed pertinent reports  Imaging: I have personally reviewed pertinent reports  EKG, Pathology, and Other Studies: I have personally reviewed pertinent reports  Code Status: Prior  Advance Directive and Living Will:      Power of :    POLST:      Counseling / Coordination of Care  Total floor / unit time spent today 45 minutes  Greater than 50% of total time was spent with the patient and / or family counseling and / or coordination of care  A description of the counseling / coordination of care:  Discussed plan of care with the patient, her , and Nephrology

## 2019-09-10 NOTE — ASSESSMENT & PLAN NOTE
Wt Readings from Last 3 Encounters:   08/23/19 113 kg (250 lb)   08/20/19 113 kg (249 lb)   08/19/19 106 kg (234 lb)     Currently appears volume depleted    IV fluids as above  Hold Lasix secondary to EDUARDO

## 2019-09-10 NOTE — PLAN OF CARE
Problem: OCCUPATIONAL THERAPY ADULT  Goal: Performs self-care activities at highest level of function for planned discharge setting  See evaluation for individualized goals  Description  Treatment Interventions: ADL retraining, Functional transfer training, Endurance training, Patient/family training, Compensatory technique education, Activityengagement, Energy conservation, Continued evaluation          See flowsheet documentation for full assessment, interventions and recommendations  Note:   Limitation: Decreased ADL status, Decreased cognition, Decreased endurance, Decreased self-care trans, Decreased high-level ADLs  Prognosis: Good  Assessment: Pt is a 53 y o  female who was [facility] to [facility] on [facility]/9/2019 with altered mental status UTI (urinary tract infection) , CKD stage 4, multiple myeloma +chemo, a-fib, CHF, anemia, diabetes type 1, TMA left foot, history of renal and pancreas transplant   Pt's problem list also includes PMH of disease of thyroid, HTN, night blindness, encephalopathy, anemia, cancer, CHF, abnormal liver function test, lumbar radiculopathy, shingles   At baseline pt was completing I with ADL's/IADL's, +RW with functional ambulation, +SPC with community distances, +drives during the day  Pt lives alone in a 1st floor apartment with 2 JESSI  Currently pt requires S/set-up for overall ADLS and min a with RW for functional mobility/transfers-verbal cues for safety  Pt currently presents with impairments in the following categories -steps to enter environment, limited home support, difficulty performing ADLS and difficulty performing IADLS  activity tolerance, endurance, standing balance/tolerance, memory, safety  and judgement    These impairments, as well as pt's fatigue, decreased caregiver support and risk for falls  limit pt's ability to safely engage in all baseline areas of occupation, includingbathing, dressing, toileting, functional mobility/transfers, community mobility, laundry , driving, house maintenance, medication management, meal prep and cleaning From OT standpoint, recommend home OT pending progress with goals/cognitition 2* to patient lives alone upon D/C  OT will continue to follow to address the below stated goals        OT Discharge Recommendation: Home OT(home OT pending goal progress/cognitive status)  OT - OK to Discharge: No

## 2019-09-10 NOTE — ASSESSMENT & PLAN NOTE
With acidosis, likely secondary to acute renal failure  Likely due to ATN or prerenal azotemia from dehydration  Received IV fluid bolus in the ED  Start bicarbonate drip based on Nephrology recommendations  Recheck blood gas at 10:00 p m  Tonight  Check tacrolimus level in a m

## 2019-09-10 NOTE — ASSESSMENT & PLAN NOTE
Acute due to chronic kidney disease    Monitor hemoglobin while inpatient  No signs of bleeding at this time

## 2019-09-10 NOTE — PLAN OF CARE
Problem: Potential for Falls  Goal: Patient will remain free of falls  Description  INTERVENTIONS:  - Assess patient frequently for physical needs  -  Identify cognitive and physical deficits and behaviors that affect risk of falls  -  [place] fall precautions as indicated by assessment   - Educate patient/family on patient safety including physical limitations  - Instruct patient to call for assistance with activity based on assessment  - Modify environment to reduce risk of injury  - Consider OT/PT consult to assist with strengthening/mobility  Outcome: Progressing     Problem: Nutrition/Hydration-ADULT  Goal: Nutrient/Hydration intake appropriate for improving, restoring or maintaining nutritional needs  Description  Monitor and assess patient's nutrition/hydration status for malnutrition  Collaborate with interdisciplinary team and initiate plan and interventions as ordered  Monitor patient's weight and dietary intake as ordered or per policy  Utilize nutrition screening tool and intervene as necessary  Determine patient's food preferences and provide high-protein, high-caloric foods as appropriate       INTERVENTIONS:  - Monitor oral intake, urinary output, labs, and treatment plans  - Assess nutrition and hydration status and recommend course of action  - Evaluate amount of meals eaten  - Assist patient with eating if necessary   - Allow adequate time for meals  - Recommend/ encourage appropriate diets, oral nutritional supplements, and vitamin/mineral supplements  - Order, calculate, and assess calorie counts as needed  - Recommend, monitor, and adjust tube feedings and TPN/PPN based on assessed needs  - Assess need for intravenous fluids  - Provide specific nutrition/hydration education as appropriate  - Include patient/family/caregiver in decisions related to nutrition  Outcome: Progressing     Problem: PAIN - ADULT  Goal: Verbalizes/displays adequate comfort level or baseline comfort level  Description  Interventions:  - Encourage patient to monitor pain and request assistance  - Assess pain using appropriate pain scale  - Administer analgesics based on type and severity of pain and evaluate response  - Implement non-pharmacological measures as appropriate and evaluate response  - Consider cultural and social influences on pain and pain management  - Notify physician/advanced practitioner if interventions unsuccessful or patient reports new pain  Outcome: Progressing     Problem: INFECTION - ADULT  Goal: Absence or prevention of progression during hospitalization  Description  INTERVENTIONS:  - Assess and monitor for signs and symptoms of infection  - Monitor lab/diagnostic results  - Monitor all insertion sites, i e  indwelling lines, tubes, and drains  - Monitor endotracheal if appropriate and nasal secretions for changes in amount and color  - [place] appropriate cooling/warming therapies per order  - Administer medications as ordered  - Instruct and encourage patient and family to use good hand hygiene technique  - Identify and instruct in appropriate isolation precautions for identified infection/condition  Outcome: Progressing  Goal: Absence of fever/infection during neutropenic period  Description  INTERVENTIONS:  - Monitor WBC    Outcome: Progressing     Problem: SAFETY ADULT  Goal: Patient will remain free of falls  Description  INTERVENTIONS:  - Assess patient frequently for physical needs  -  Identify cognitive and physical deficits and behaviors that affect risk of falls    -  [place] fall precautions as indicated by assessment   - Educate patient/family on patient safety including physical limitations  - Instruct patient to call for assistance with activity based on assessment  - Modify environment to reduce risk of injury  - Consider OT/PT consult to assist with strengthening/mobility  Outcome: Progressing  Goal: Maintain or return to baseline ADL function  Description  INTERVENTIONS:  -  Assess patient's ability to carry out ADLs; assess patient's baseline for ADL function and identify physical deficits which impact ability to perform ADLs (bathing, care of mouth/teeth, toileting, grooming, dressing, etc )  - Assess/evaluate cause of self-care deficits   - Assess range of motion  - Assess patient's mobility; develop plan if impaired  - Assess patient's need for assistive devices and provide as appropriate  - Encourage maximum independence but intervene and supervise when necessary  - Involve family in performance of ADLs  - Assess for home care needs following discharge   - Consider OT consult to assist with ADL evaluation and planning for discharge  - Provide patient education as appropriate  Outcome: Progressing  Goal: Maintain or return mobility status to optimal level  Description  INTERVENTIONS:  - Assess patient's baseline mobility status (ambulation, transfers, stairs, etc )    - Identify cognitive and physical deficits and behaviors that affect mobility  - Identify mobility aids required to assist with transfers and/or ambulation (gait belt, sit-to-stand, lift, walker, cane, etc )  - [place] fall precautions as indicated by assessment  - Record patient progress and toleration of activity level on Mobility SBAR; progress patient to next Phase/Stage  - Instruct patient to call for assistance with activity based on assessment  - Consider rehabilitation consult to assist with strengthening/weightbearing, etc   Outcome: Progressing     Problem: DISCHARGE PLANNING  Goal: Discharge to home or other facility with appropriate resources  Description  INTERVENTIONS:  - Identify barriers to discharge w/patient and caregiver  - Arrange for needed discharge resources and transportation as appropriate  - Identify discharge learning needs (meds, wound care, etc )  - Arrange for interpretive services to assist at discharge as needed  - Refer to Case Management Department for coordinating discharge planning if the patient needs post-hospital services based on physician/advanced practitioner order or complex needs related to functional status, cognitive ability, or social support system  Outcome: Progressing     Problem: Knowledge Deficit  Goal: Patient/family/caregiver demonstrates understanding of disease process, treatment plan, medications, and discharge instructions  Description  Complete learning assessment and assess knowledge base    Interventions:  - Provide teaching at level of understanding  - Provide teaching via preferred learning methods  Outcome: Progressing

## 2019-09-10 NOTE — CONSULTS
EDUARDO  · Baseline creatinine 2, nephrologist noted increasing creatinine 4 4 in outpatient labs, recommended patient to come to ED  · Creatinine 4 4--> 4 15 today  · Continue sodium bicarb 150 IV at 125 mL/hr, a m  BMP  · Likely pre renal azotemia due to patient's decrease in oral and fluid intake  Given extensive history cannot rule out intrarenal or postrenal   Could be ATN due to hypoperfusion, blood pressure has been lower than usual    · Possible AIN 2/2 chemotherapy, eosinophils 14%, urine eosinophils pending  · Ultrasound of transplant kidney with Doppler:  No hydronephrosis or renal vein thrombosis noted  Elevated arterial resistive indices  · Will order post residual volume bladder scan    CKD stage 4   · Status post renal and pancreatic transplant 1998  · Immunosuppression medications include tacrolimus 3 mg in the a m , 2 mg in p m , prednisone 5 mg  Goal tacrolimus level 4, most recent 5 1, today's pending  · 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 versus arteriolar/hypertensive nephrosclerosis  · Baseline creatinine 2, nephrologist noted increasing creatinine 4 4 in outpatient labs, recommended patient to come to ED  · Creatinine today 4 15, if creatinine worsens patient may need to be re-evaluated for transplant at [facility]    UTI, recurrent  · History of recurring UTI/pyelonephritis, E  Coli  · UA was 2+ protein positive nitrates, positive white blood cells, positive leukocytes, moderate bacteria, urine culture pending  · Continue cefepime IV, urine culture from last month showed pansusceptibility    Non anion gap Metabolic acidosis  · ABG on admission:  PH 7 1  Most recent ABG showed pH 7 25, CO2 28 9, bicarb 15     · Likely due to chronic renal failure  · Improving, patient responding to bicarb fluids, continue to monitor  · Alert and oriented x3  · Urine electrolytes pending    Hypertension/volume status  · 119-164/47-88 over last 24 hours, most recent 136/40  · Reviewed hold parameters for blood pressure medications, continue to monitor  · Patient appears to be euvolemic, denies shortness of breath     Anemia  · Hemoglobin 7 6, baseline seems to be around 9-10  · Multifactorial, CKD stage 4 versus multiple myeloma versus chemotherapy  · Continue to monitor, transfuse as needed    Bone mineral disease  · Magnesium 2 4, phosphorus 7 7, likely increased due to EDUARDO on CKD  · Continue sevlamer, repeat levels tomorrow    Other disorders:  Multiple myeloma  ·  Patient diagnosed with multiple myeloma in 04/2019 from smoldering myeloma diagnosed in 09/2017  Follows with Dr Suzan Gunter  Patient currently on Revlimid 5 mg every other day 3 weeks on 1 week off  Hypothyroidism  Aortic regurgitation  Diastolic congestive heart failure    HISTORY OF PRESENT ILLNESS   Reason for Admission / Principal Problem: recurrent UTI, metabolic encephalopathy  Reason for Consult:  EDUARDO on CKD in renal transplant patient    Sidney Smith 53 y o  female with past medical history of CKD stage 4 status post renal and pancreatic transplant 1998, multiple myeloma, hypertension, type 1 diabetes, recurring UTI and pyelonephritis, diastolic congestive heart failure grade 1  She was sent to the ED yesterday by her nephrologist Dr Wil Woods who noted her creatinine increased to 4 4, which is much higher compared to her baseline of 2  The patient did have a renal artery study with stenosis at [facility], 09/25/2018  Had an IR team for CO2 study  Showed transplant artery slightly tortuous but artery widely patent  Per Nephrology notes 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  Patient was admitted last month to [facility] for similar issue of EDUARDO and recurring UTI  Patient diagnosed with multiple myeloma in 04/2019 from smoldering myeloma diagnosed in 09/2017  Follows with Dr Suzan Gunter  Patient currently on Revlimid 5 mg every other day 3 weeks on 1 week off      Patient was seen sitting comfortably on the recliner  She states she is feeling better overall compared to yesterday  Admits to dizziness, confusion, slight mental status change over the last 3 days  Patient admits to having UTI symptoms of dysuria, increased frequency, increased urgency, decreased urinary flow for the last 3 days  Has not been urinating as much  Per patient it feels like a bad UTI, as she has had multiple over the last year  Patient admits to fatigue and loss of appetite, which she contributes to chemotherapy  Patient states she has not been eating or drinking fluids this past week  Drinks about 2-3 8 oz glasses of water per day  Denies any fevers or chills, nausea, vomiting, diarrhea, constipation  REVIEW OF SYSTEMS   Review of Systems   Constitutional: Positive for activity change and fatigue  Negative for chills, fever and unexpected weight change  HENT: Negative for voice change  Eyes: Negative for visual disturbance  Respiratory: Negative for cough, choking, shortness of breath and wheezing  Cardiovascular: Negative for chest pain and leg swelling  Gastrointestinal: Negative for abdominal pain, blood in stool, diarrhea, nausea and vomiting  Genitourinary: Positive for decreased urine volume, dysuria, frequency and urgency  Negative for hematuria  Neurological: Positive for dizziness and weakness  Psychiatric/Behavioral: Positive for confusion  OBJECTIVE     Vitals:    09/10/19 0516 09/10/19 0711 09/10/19 0847 09/10/19 0847   BP: 154/58 150/56 (!) 136/47 (!) 136/47   Pulse: 55 (!) 53  56   Resp:  16     Temp:  97 5 °F (36 4 °C)     TempSrc:       SpO2: 100% 98%  99%   Weight:       Height:          Temperature:   Temp (24hrs), Av 5 °F (36 4 °C), Min:97 1 °F (36 2 °C), Max:98 °F (36 7 °C)    Temperature: 97 5 °F (36 4 °C)  Intake & Output:  I/O        07 -  0700 701 - 09/10 0700 09/10 0701 -  0700    P  O    240    I V  (mL/kg)   970 (8 6)    IV Piggyback  50     Total Intake(mL/kg)  50 (0 4) 1210 (10 7)    Urine (mL/kg/hr)   200 (0 5)    Total Output   200    Net  +50 +1010           Unmeasured Urine Occurrence  1 x         Weights:   IBW: 63 9 kg    Body mass index is 37 88 kg/m²  Weight (last 2 days)     Date/Time   Weight    09/10/19 0300   113 (249 12)            Physical Exam   Constitutional: She is oriented to person, place, and time  HENT:   Head: Normocephalic and atraumatic  Mouth/Throat: No oropharyngeal exudate  Eyes: No scleral icterus  Neck: No JVD present  Cardiovascular: Normal rate, regular rhythm and intact distal pulses  Exam reveals no gallop and no friction rub  No murmur heard  Pulmonary/Chest: Breath sounds normal  No respiratory distress  She has no wheezes  She exhibits no tenderness  Abdominal:   Obese, mildly distended non-tender to palpation   Musculoskeletal:   1+ b/l pitting ankle edema   Neurological: She is alert and oriented to person, place, and time  Psychiatric: She has a normal mood and affect       PAST MEDICAL HISTORY     Past Medical History:   Diagnosis Date    Abnormal liver function test     Acute kidney injury ([address] )     Acute on chronic congestive heart failure (HCC)     Allergic urticaria     Anemia     Cancer (HCC)     Multiple myeloma    Cervical dysplasia     Cholelithiasis     Chronic diastolic (congestive) heart failure ([address] ) 9/18/2017    Diabetes mellitus ([address] )     Previous, controlled with diet    Diabetes mellitus with foot ulcer ([address] )     Disease of thyroid gland     Encephalopathy     Hematuria     + leak est- secondary to UTIs/panc drainage    History of transfusion     Hyperkalemia     Hypertension     Iliotibial band syndrome     Lumbar radiculopathy     Multiple myeloma (HCC)     Multiple myeloma (HCC)     Night blindness     Nonrheumatic aortic (valve) insufficiency     Pneumonia     Renal disorder     Retinopathy     Seborrhea     Seizure ([address] )     Shingles     Sinus tachycardia     B blocker - cardio echo stress test 02 normal/neg LE doppler  OK and     Status post simultaneous kidney and pancreas transplant ([address] )     Toe amputation status (HCC)     Trochanteric bursitis      PAST SURGICAL HISTORY     Past Surgical History:   Procedure Laterality Date    CATARACT EXTRACTION      CHOLECYSTECTOMY      COLONOSCOPY      two polyps in the rectum removed and biopsied diverticulosis in the sigmoid colon, external hemorrhoiods- Dr Barfield    COMBINED KIDNEY-PANCREAS TRANSPLANT N/A     CT BONE MARROW BIOPSY AND ASPIRATION  2019    CYSTOSCOPY N/A 10/13/2016    Procedure: CYSTOSCOPY, retrograde pyelogram, biopsy of ureteral polyp; Surgeon: Shanice Bryan MD;  Location: [facility];  Service:    Maris Stearns DILATION AND CURETTAGE OF UTERUS      ESOPHAGOGASTRODUODENOSCOPY N/A 2017    Procedure: ESOPHAGOGASTRODUODENOSCOPY (EGD); Surgeon: Tammi Vanegas DO;  Location: [facility];   Service: Gastroenterology    EYE SURGERY      cataracts    FOOT AMPUTATION THROUGH METATARSAL Left     FOOT SURGERY Right     excision of metatarsal heads    HALLUX VALGUS CORRECTION Right     NEPHRECTOMY TRANSPLANTED ORGAN      PANCREATIC TRANSPLANT REMOVAL       SOCIAL & FAMILY HISTORY     Social History     Substance and Sexual Activity   Alcohol Use Never    Frequency: Never    Binge frequency: Never    Comment: (history)     Social History     Substance and Sexual Activity   Drug Use Never    Types: Hydrocodone    Comment: Past cocaine use many years ago - no current use     Social History     Tobacco Use   Smoking Status Former Smoker    Last attempt to quit: 2012    Years since quittin 3   Smokeless Tobacco Never Used     Family History   Problem Relation Age of Onset    Hypertension Mother     Cancer Mother     Hypertension Father     Cancer Father     Cancer Maternal Grandfather     Cancer Paternal Grandmother     Cancer Paternal Grandfather     Depression Sister     Breast cancer Maternal Grandmother 77     LABORATORY DATA     Labs: I have personally reviewed pertinent reports  Results from last 7 days   Lab Units 09/10/19  0504 09/09/19  2149 09/09/19  0859   WBC Thousand/uL 6 98  --  6 50   HEMOGLOBIN g/dL 7 6*  --  8 2*   HEMATOCRIT % 25 5*  --  28 5*   PLATELETS Thousands/uL 207 201 209   NEUTROS PCT % 60  --   --    MONOS PCT % 9  --   --    MONO PCT %  --   --  4    Results from last 7 days   Lab Units 09/10/19  0504 09/09/19  0859   POTASSIUM mmol/L 3 9 4 4   CHLORIDE mmol/L 111* 112*   CO2 mmol/L 15* 12*   BUN mg/dL 78* 85*   CREATININE mg/dL 4 15* 4 42*   CALCIUM mg/dL 6 9* 7 6*   ALK PHOS U/L  --  127*   ALT U/L  --  26   AST U/L  --  23     Results from last 7 days   Lab Units 09/09/19  0859   MAGNESIUM mg/dL 2 4     Results from last 7 days   Lab Units 09/09/19  0859   PHOSPHORUS mg/dL 7 7*          Results from last 7 days   Lab Units 09/09/19  2149   LACTIC ACID mmol/L 0 4*         Micro:  Lab Results   Component Value Date    BLOODCX No Growth After 5 Days  11/16/2017    BLOODCX No Growth After 5 Days  09/13/2017    BLOODCX No Growth After 5 Days  09/13/2017    URINECX >100,000 cfu/ml Escherichia coli (A) 08/14/2019    URINECX >100,000 cfu/ml Escherichia coli (A) 01/24/2019    URINECX >100,000 cfu/ml Escherichia coli (A) 11/26/2018     IMAGING & DIAGNOSTIC TESTS     Imaging: I have personally reviewed pertinent reports  Us Transplant Kidney With Doppler    Result Date: 9/9/2019  Impression: 1  Elevated arterial resistive indices in the transplant kidney up to 0 8 concerning for possible development of transplant rejection or ATN  Nephrology consult recommended  No hydronephrosis or renal vein thrombosis identified  2  Atrophic native kidneys  Workstation performed: [id number]     EKG, Pathology, and Other Studies: I have personally reviewed pertinent reports       ALLERGIES     Allergies   Allergen Reactions    Ixazomib Rash     Ninlaro    Revlimid [Lenalidomide] Rash    Cefadroxil     Latex Rash    Morphine Other (See Comments)     Other reaction(s): Other (See Comments)  projectile vomiting    Morphine And Related GI Intolerance    Myrbetriq [Mirabegron] Hives    Penicillins Hives     Other reaction(s): Other (See Comments)  Respiratory Distress,hives  Tolerates cefazolin     MEDICATIONS PRIOR TO ARRIVAL     Prior to Admission medications    Medication Sig Start Date End Date Taking?  Authorizing Provider   amLODIPine (NORVASC) 10 mg tablet TAKE 1 TABLET(10MG) BY MOUTH EVERY MORNING AND 1/2 TABLET(5MG) EVERY EVENING 4/8/19  Yes Allyn Velázquez MD   ARIPiprazole (ABILIFY) 30 mg tablet Take 1 tablet (30 mg total) by mouth daily at bedtime for 180 days 6/5/19 12/2/19 Yes Julio Brito MD   aspirin 81 MG tablet Take 1 tablet by mouth daily 6/5/13  Yes Historical Provider, MD   busPIRone (BUSPAR) 5 mg tablet Take 1 tablet (5 mg total) by mouth 2 (two) times a day for 180 days 6/5/19 12/2/19 Yes Julio Brito MD   Cholecalciferol (VITAMIN D3) 1000 units CAPS Take 3 capsules by mouth daily     Yes Historical Provider, MD   cloNIDine (CATAPRES) 0 1 mg tablet TAKE 3 TABLETS BY MOUTH EVERY MORNING, 3 TABLETS AT NOON, AND 3 TABLETS EVERY EVENING 5/26/19  Yes MER Montana   doxazosin (CARDURA) 1 mg tablet TAKE 2 TABLETS BY MOUTH EVERY NIGHT AT BEDTIME 8/19/19  Yes Allyn Velázquez MD   DULoxetine (CYMBALTA) 60 mg delayed release capsule Take 1 capsule (60 mg total) by mouth 2 (two) times a day for 180 days 6/5/19 12/2/19 Yes Julio Brito MD   folic acid (FOLVITE) 1 mg tablet TAKE 1 TABLET BY MOUTH DAILY AS DIRECTED 2/22/19  Yes Brenda Matta MD   furosemide (LASIX) 20 mg tablet TAKE 1 TABLET BY MOUTH EVERY DAY 7/20/19  Yes Allyn Velázquez MD   hydrALAZINE (APRESOLINE) 50 mg tablet TAKE 1 TABLET(50MG) BY MOUTH THREE TIMES DAILY FOR 90 DAYS 7/20/19  Yes Allyn Velázquez MD   Insulin Glargine (TOUJEO SOLOSTAR) 300 units/mL CONCETRATED U-300 injection pen Inject 1 Units under the skin daily at bedtime 1/10/19  Yes Dawood Hill MD   lenalidomide (REVLIMID) 5 MG CAPS Take 5 mg every other day for 21 days on and 7 days off [facility]#[id number] 8/22/19 8/22/19  Yes Priyanka Cunha PA-C   levothyroxine 125 mcg tablet Take 1 tablet (125 mcg total) by mouth daily 6/24/19  Yes Nya Lutz PA-C   metoprolol tartrate (LOPRESSOR) 50 mg tablet TAKE 1 TABLET(50MG TOTAL) BY MOUTH TWICE DAILY 7/9/18  Yes Antoni Rivers MD   pravastatin (PRAVACHOL) 80 mg tablet TAKE 1 TABLET BY MOUTH DAILY 1/29/18  Yes Malgorzata Gale MD   predniSONE 5 mg tablet Take 1 tablet (5 mg total) by mouth daily 5/24/19  Yes MER Ye   rOPINIRole (REQUIP) 0 25 mg tablet TAKE 1 TABLET BY MOUTH TWICE DAILY 8/18/19  Yes Malgorzata Gale MD   sertraline (ZOLOFT) 100 mg tablet Take 2 tablets (200 mg total) by mouth daily for 180 days 6/5/19 12/2/19 Yes Nataly Palmer MD   sevelamer carbonate (RENVELA) 800 mg tablet Take 1 tablet (800 mg total) by mouth 3 (three) times a day with meals 7/18/19  Yes Antoni Rivers MD   sodium bicarbonate 650 mg tablet TAKE 2 TABLETS BY MOUTH THREE TIMES DAILY 5/28/19  Yes Antoni Rivers MD   tacrolimus (PROGRAF) 1 mg capsule Take 3 mg in AM and 2 mg in PM (6/18/19) 6/18/19  Yes Antoni Rivers MD   insulin lispro (HUMALOG KWIKPEN) 100 units/mL injection pen INJECT 10 UNDER THE SKIN EVERY DAY OR AS DIRECTED  Patient taking differently: INJECT 10 UNDER THE SKIN EVERY DAY OR AS DIRECTED 5/21/19   Dawood Hill MD   Insulin Pen Needle (BD PEN NEEDLE VISHNU U/F) 32G X 4 MM MISC Use 4 times daily 8/23/18   Dawood Hill MD   Lancets (ONETOUCH ULTRASOFT) lancets by Does not apply route 4 (four) times a day   3/9/16   Historical Provider, MD   LORazepam (ATIVAN) 2 mg tablet Take 1 tablet (2 mg total) by mouth 3 (three) times a day as needed for anxiety for up to 120 days To be filled on or after 6/29/19 6/29/19 10/27/19  Annett Peabody, MD   ONE TOUCH ULTRA TEST test strip Use 4 times daily ( please dispense One touch Ultra test strips) 7/9/19   Marcy Jasso MD     MEDICATIONS ADMINISTERED IN LAST 24 HOURS     Medication Administration - last 24 hours from 09/09/2019 1031 to 09/10/2019 1031       Date/Time Order Dose Route Action Action by     09/09/2019 1951 cefepime (MAXIPIME) 1,000 mg in dextrose 5 % 50 mL IVPB 0 mg Intravenous Stopped Tabby Robles RN     09/09/2019 1859 cefepime (MAXIPIME) 1,000 mg in dextrose 5 % 50 mL IVPB 1,000 mg Intravenous [address] Bhavya Castro, MICHELLE     09/09/2019 2130 sodium chloride 0 9 % bolus 1,000 mL 0 mL Intravenous Stopped Jaye Ivy RN     09/09/2019 1844 sodium chloride 0 9 % bolus 1,000 mL 1,000 mL Intravenous [place] [address] Bhavya Castro, MICHELLE     09/09/2019 2314 ARIPiprazole (ABILIFY) tablet 30 mg 30 mg Oral Given Rosaura Joshi, RN     09/10/2019 0848 aspirin chewable tablet 81 mg 81 mg Oral Given Lon Cox RN     09/10/2019 0529 cloNIDine (CATAPRES) tablet 0 3 mg 0 3 mg Oral Given True Necessary, RN     09/09/2019 2312 cloNIDine (CATAPRES) tablet 0 3 mg 0 3 mg Oral Given Rosaura Joshi, RN     09/10/2019 0849 amLODIPine (NORVASC) tablet 10 mg 10 mg Oral Given Lon Cox RN     09/09/2019 2311 doxazosin (CARDURA) tablet 2 mg 0 mg Oral Hold Rosaura Joshi, RN     09/10/2019 0847 DULoxetine (CYMBALTA) delayed release capsule 60 mg 60 mg Oral Given Lon Cox RN     09/09/2019 2312 DULoxetine (CYMBALTA) delayed release capsule 60 mg 60 mg Oral Given Rosaura Joshi RN     57/18/9071 7999 folic acid (FOLVITE) tablet 1,000 mcg 1,000 mcg Oral Given Lon Cox RN     09/10/2019 0529 hydrALAZINE (APRESOLINE) tablet 50 mg 50 mg Oral Given True Necessary, RN     09/09/2019 2093 hydrALAZINE (APRESOLINE) tablet 50 mg 0 mg Oral Hold Rosaura Child, RN     09/10/2019 0434 levothyroxine tablet 125 mcg 125 mcg Oral Given Todd Rosario, RN     09/10/2019 0849 metoprolol tartrate (LOPRESSOR) tablet 50 mg 50 mg Oral Given Torres Albarran, RN     09/09/2019 2312 metoprolol tartrate (LOPRESSOR) tablet 50 mg 50 mg Oral Given [facility] Ehsan, RN     09/10/2019 0848 predniSONE tablet 5 mg 5 mg Oral Given Torres Albarran, RN     09/10/2019 0850 rOPINIRole (REQUIP) tablet 0 25 mg 0 25 mg Oral Given Torres Albarran, RN     09/09/2019 2315 rOPINIRole (REQUIP) tablet 0 25 mg 0 25 mg Oral Given [facility] Eshan, RN     09/10/2019 0847 sertraline (ZOLOFT) tablet 200 mg 200 mg Oral Given Torres Albarran, RN     09/10/2019 0847 sevelamer (RENAGEL) tablet 800 mg 800 mg Oral Given Torres Albarran, RN     09/10/2019 0847 sodium bicarbonate tablet 1,300 mg 1,300 mg Oral Given Torres Albarran, RN     09/09/2019 2313 sodium bicarbonate tablet 1,300 mg 1,300 mg Oral Given [facility] Ehsan, RN     09/10/2019 0849 tacrolimus (PROGRAF) capsule 3 mg 3 mg Oral Given Torres Albarran, RN     09/09/2019 2315 tacrolimus (PROGRAF) capsule 2 mg 2 mg Oral Given [facility] Ehsan, RN     09/09/2019 2025 sodium bicarbonate 8 4 % injection 50 mEq 50 mEq Intravenous Given Paresh Kim, RN     09/10/2019 3820 sodium bicarbonate 150 mEq in dextrose 5 % 1,000 mL infusion 125 mL/hr Intravenous [address] Torres Albarran, MICHELLE     09/10/2019 7996 sodium bicarbonate 150 mEq in dextrose 5 % 1,000 mL infusion 0 mL/hr Intravenous Stopped Torres Albarran, MICHELLE     09/09/2019 2156 sodium bicarbonate 150 mEq in dextrose 5 % 1,000 mL infusion 125 mL/hr Intravenous [address] Gearl [place], [facility]     09/10/2019 0530 heparin (porcine) subcutaneous injection 5,000 Units 5,000 Units Subcutaneous Given Todd Rosario, RN     09/09/2019 2312 heparin (porcine) subcutaneous injection 5,000 Units 5,000 Units Subcutaneous Given Isabella Moser RN     09/10/2019 0848 insulin lispro (HumaLOG) 100 units/mL subcutaneous injection 1-6 Units 1 Units Subcutaneous Given Torres Albarran RN     09/10/2019 9132 acetaminophen (TYLENOL) tablet 650 mg   Oral Canceled Entry Solis Foreman RN     09/09/2019 2201 insulin lispro (HumaLOG) 100 units/mL subcutaneous injection 1-6 Units 1 Units Subcutaneous Not Given Solis Foreman RN        CURRENT MEDICATIONS     Current Facility-Administered Medications:  acetaminophen 650 mg Oral Q6H PRN Ether Lower, MD    amLODIPine 10 mg Oral Daily Ether Lower, MD    ARIPiprazole 30 mg Oral HS Ether Lower, MD    aspirin 81 mg Oral Daily Ether Lower, MD    cefepime 500 mg Intravenous Q24H Ether Lower, MD    cloNIDine 0 3 mg Oral Q8H [address] Ether Lower, MD    doxazosin 2 mg Oral HS Ether Lower, MD    DULoxetine 60 mg Oral BID Ether Lower, MD    folic acid 8,690 mcg Oral Daily Ether Lower, MD    heparin (porcine) 5,000 Units Subcutaneous [facility] Ether Lower, MD    hydrALAZINE 50 mg Oral Q8H [address] Ether Lower, MD    insulin lispro 1-6 Units Subcutaneous TID AC Ether Lower, MD    insulin lispro 1-6 Units Subcutaneous HS Ether Lower, MD    levothyroxine 125 mcg Oral Daily Ether Lower, MD    LORazepam 1 mg Oral TID PRN Ether Lower, MD    metoprolol tartrate 50 mg Oral Q12H Mike Pepe, MD    predniSONE 5 mg Oral Daily Ether Lower, MD    rOPINIRole 0 25 mg Oral BID Ether Lower, MD    sertraline 200 mg Oral Daily Ether Lower, MD    sevelamer 800 mg Oral TID With Meals Ether Lower, MD    sodium bicarbonate infusion 125 mL/hr Intravenous Continuous Marianne Almanza MD Last Rate: 125 mL/hr (09/10/19 0917)   sodium bicarbonate 1,300 mg Oral TID Ether Lower, MD    tacrolimus 2 mg Oral HS Ether Lower, MD    tacrolimus 3 mg Oral QAM Ether Lower, MD        sodium bicarbonate infusion 125 mL/hr Last Rate: 125 mL/hr (09/10/19 0917)       acetaminophen 650 mg Q6H PRN   LORazepam 1 mg TID PRN       Portions of the record may have been created with voice recognition software    Occasional wrong word or "sound a like" substitutions may have occurred due to the inherent limitations of voice recognition software    Read the chart carefully and recognize, using context, where substitutions have occurred     ==  Kerri Tillman, [address] [facility]  Internal Medicine Residency

## 2019-09-10 NOTE — ASSESSMENT & PLAN NOTE
Lab Results   Component Value Date    HGBA1C 5 1 09/09/2019       No results for input(s): POCGLU in the last 72 hours      Blood Sugar Average: Last 72 hrs:   takes 1 unit of toujeo a daily at home, will restart  Diabetic diet/renal diet  Sliding scale coverage

## 2019-09-11 LAB
ANION GAP SERPL CALCULATED.3IONS-SCNC: 13 MMOL/L (ref 4–13)
BUN SERPL-MCNC: 63 MG/DL (ref 5–25)
CALCIUM SERPL-MCNC: 7.7 MG/DL (ref 8.3–10.1)
CHLORIDE SERPL-SCNC: 107 MMOL/L (ref 100–108)
CO2 SERPL-SCNC: 20 MMOL/L (ref 21–32)
CREAT SERPL-MCNC: 3.47 MG/DL (ref 0.6–1.3)
EOSINOPHIL NFR URNS MANUAL: 0 %
ERYTHROCYTE [DISTWIDTH] IN BLOOD BY AUTOMATED COUNT: 16.3 % (ref 11.6–15.1)
GFR SERPL CREATININE-BSD FRML MDRD: 14 ML/MIN/1.73SQ M
GLUCOSE SERPL-MCNC: 133 MG/DL (ref 65–140)
GLUCOSE SERPL-MCNC: 134 MG/DL (ref 65–140)
GLUCOSE SERPL-MCNC: 148 MG/DL (ref 65–140)
GLUCOSE SERPL-MCNC: 157 MG/DL (ref 65–140)
GLUCOSE SERPL-MCNC: 197 MG/DL (ref 65–140)
HCT VFR BLD AUTO: 24.5 % (ref 34.8–46.1)
HGB BLD-MCNC: 7.5 G/DL (ref 11.5–15.4)
MCH RBC QN AUTO: 28.7 PG (ref 26.8–34.3)
MCHC RBC AUTO-ENTMCNC: 30.6 G/DL (ref 31.4–37.4)
MCV RBC AUTO: 94 FL (ref 82–98)
PLATELET # BLD AUTO: 202 THOUSANDS/UL (ref 149–390)
PMV BLD AUTO: 13.4 FL (ref 8.9–12.7)
POTASSIUM SERPL-SCNC: 3.4 MMOL/L (ref 3.5–5.3)
RBC # BLD AUTO: 2.61 MILLION/UL (ref 3.81–5.12)
SODIUM SERPL-SCNC: 140 MMOL/L (ref 136–145)
TACROLIMUS BLD-MCNC: 11.6 NG/ML (ref 2–20)
TACROLIMUS BLD-MCNC: 3.4 NG/ML (ref 2–20)
URATE SERPL-MCNC: 8.1 MG/DL (ref 2–6.8)
WBC # BLD AUTO: 7.42 THOUSAND/UL (ref 4.31–10.16)

## 2019-09-11 PROCEDURE — 80048 BASIC METABOLIC PNL TOTAL CA: CPT | Performed by: INTERNAL MEDICINE

## 2019-09-11 PROCEDURE — G8978 MOBILITY CURRENT STATUS: HCPCS

## 2019-09-11 PROCEDURE — 84550 ASSAY OF BLOOD/URIC ACID: CPT | Performed by: STUDENT IN AN ORGANIZED HEALTH CARE EDUCATION/TRAINING PROGRAM

## 2019-09-11 PROCEDURE — 99232 SBSQ HOSP IP/OBS MODERATE 35: CPT | Performed by: INTERNAL MEDICINE

## 2019-09-11 PROCEDURE — 99233 SBSQ HOSP IP/OBS HIGH 50: CPT | Performed by: INTERNAL MEDICINE

## 2019-09-11 PROCEDURE — 82948 REAGENT STRIP/BLOOD GLUCOSE: CPT

## 2019-09-11 PROCEDURE — 97162 PT EVAL MOD COMPLEX 30 MIN: CPT

## 2019-09-11 PROCEDURE — 80197 ASSAY OF TACROLIMUS: CPT | Performed by: INTERNAL MEDICINE

## 2019-09-11 PROCEDURE — 97535 SELF CARE MNGMENT TRAINING: CPT

## 2019-09-11 PROCEDURE — G8979 MOBILITY GOAL STATUS: HCPCS

## 2019-09-11 PROCEDURE — 85027 COMPLETE CBC AUTOMATED: CPT | Performed by: INTERNAL MEDICINE

## 2019-09-11 RX ORDER — POTASSIUM CHLORIDE 20 MEQ/1
20 TABLET, EXTENDED RELEASE ORAL ONCE
Status: COMPLETED | OUTPATIENT
Start: 2019-09-11 | End: 2019-09-11

## 2019-09-11 RX ORDER — HYDRALAZINE HYDROCHLORIDE 20 MG/ML
5 INJECTION INTRAMUSCULAR; INTRAVENOUS ONCE AS NEEDED
Status: COMPLETED | OUTPATIENT
Start: 2019-09-11 | End: 2019-09-11

## 2019-09-11 RX ORDER — SODIUM CHLORIDE, SODIUM GLUCONATE, SODIUM ACETATE, POTASSIUM CHLORIDE, MAGNESIUM CHLORIDE, SODIUM PHOSPHATE, DIBASIC, AND POTASSIUM PHOSPHATE .53; .5; .37; .037; .03; .012; .00082 G/100ML; G/100ML; G/100ML; G/100ML; G/100ML; G/100ML; G/100ML
100 INJECTION, SOLUTION INTRAVENOUS CONTINUOUS
Status: DISCONTINUED | OUTPATIENT
Start: 2019-09-11 | End: 2019-09-12

## 2019-09-11 RX ORDER — INSULIN GLARGINE 100 [IU]/ML
5 INJECTION, SOLUTION SUBCUTANEOUS
Status: DISCONTINUED | OUTPATIENT
Start: 2019-09-11 | End: 2019-09-13 | Stop reason: HOSPADM

## 2019-09-11 RX ADMIN — ARIPIPRAZOLE 30 MG: 15 TABLET ORAL at 21:20

## 2019-09-11 RX ADMIN — CLONIDINE HYDROCHLORIDE 0.3 MG: 0.1 TABLET ORAL at 21:21

## 2019-09-11 RX ADMIN — CEFEPIME HYDROCHLORIDE 500 MG: 1 INJECTION, POWDER, FOR SOLUTION INTRAMUSCULAR; INTRAVENOUS at 18:12

## 2019-09-11 RX ADMIN — HYDRALAZINE HYDROCHLORIDE 50 MG: 50 TABLET ORAL at 15:04

## 2019-09-11 RX ADMIN — DULOXETINE HYDROCHLORIDE 60 MG: 60 CAPSULE, DELAYED RELEASE ORAL at 08:36

## 2019-09-11 RX ADMIN — SODIUM BICARBONATE 650 MG TABLET 1300 MG: at 08:36

## 2019-09-11 RX ADMIN — HEPARIN SODIUM 5000 UNITS: 5000 INJECTION, SOLUTION INTRAVENOUS; SUBCUTANEOUS at 21:22

## 2019-09-11 RX ADMIN — SODIUM BICARBONATE 125 ML/HR: 84 INJECTION, SOLUTION INTRAVENOUS at 04:27

## 2019-09-11 RX ADMIN — SODIUM BICARBONATE 650 MG TABLET 1300 MG: at 21:21

## 2019-09-11 RX ADMIN — ROPINIROLE HYDROCHLORIDE 0.25 MG: 0.25 TABLET, FILM COATED ORAL at 18:09

## 2019-09-11 RX ADMIN — POTASSIUM CHLORIDE 20 MEQ: 1500 TABLET, EXTENDED RELEASE ORAL at 08:37

## 2019-09-11 RX ADMIN — TACROLIMUS 3 MG: 1 CAPSULE ORAL at 10:28

## 2019-09-11 RX ADMIN — SERTRALINE HYDROCHLORIDE 200 MG: 100 TABLET ORAL at 08:36

## 2019-09-11 RX ADMIN — HYDRALAZINE HYDROCHLORIDE 50 MG: 50 TABLET ORAL at 05:28

## 2019-09-11 RX ADMIN — METOPROLOL TARTRATE 50 MG: 50 TABLET, FILM COATED ORAL at 21:21

## 2019-09-11 RX ADMIN — HEPARIN SODIUM 5000 UNITS: 5000 INJECTION, SOLUTION INTRAVENOUS; SUBCUTANEOUS at 05:26

## 2019-09-11 RX ADMIN — INSULIN LISPRO 1 UNITS: 100 INJECTION, SOLUTION INTRAVENOUS; SUBCUTANEOUS at 08:36

## 2019-09-11 RX ADMIN — DULOXETINE HYDROCHLORIDE 60 MG: 60 CAPSULE, DELAYED RELEASE ORAL at 18:08

## 2019-09-11 RX ADMIN — SEVELAMER HYDROCHLORIDE 800 MG: 800 TABLET, FILM COATED PARENTERAL at 08:36

## 2019-09-11 RX ADMIN — TACROLIMUS 2 MG: 1 CAPSULE ORAL at 21:21

## 2019-09-11 RX ADMIN — SODIUM CHLORIDE, SODIUM GLUCONATE, SODIUM ACETATE, POTASSIUM CHLORIDE AND MAGNESIUM CHLORIDE 100 ML/HR: 526; 502; 368; 37; 30 INJECTION, SOLUTION INTRAVENOUS at 10:29

## 2019-09-11 RX ADMIN — METOPROLOL TARTRATE 50 MG: 50 TABLET, FILM COATED ORAL at 08:36

## 2019-09-11 RX ADMIN — HYDRALAZINE HYDROCHLORIDE 50 MG: 50 TABLET ORAL at 21:21

## 2019-09-11 RX ADMIN — SEVELAMER HYDROCHLORIDE 800 MG: 800 TABLET, FILM COATED PARENTERAL at 18:08

## 2019-09-11 RX ADMIN — AMLODIPINE BESYLATE 10 MG: 10 TABLET ORAL at 08:36

## 2019-09-11 RX ADMIN — LEVOTHYROXINE SODIUM 125 MCG: 125 TABLET ORAL at 05:26

## 2019-09-11 RX ADMIN — ROPINIROLE HYDROCHLORIDE 0.25 MG: 0.25 TABLET, FILM COATED ORAL at 08:38

## 2019-09-11 RX ADMIN — INSULIN GLARGINE 5 UNITS: 100 INJECTION, SOLUTION SUBCUTANEOUS at 21:23

## 2019-09-11 RX ADMIN — FOLIC ACID 1000 MCG: 1 TABLET ORAL at 08:36

## 2019-09-11 RX ADMIN — HYDRALAZINE HYDROCHLORIDE 5 MG: 20 INJECTION INTRAMUSCULAR; INTRAVENOUS at 00:43

## 2019-09-11 RX ADMIN — SEVELAMER HYDROCHLORIDE 800 MG: 800 TABLET, FILM COATED PARENTERAL at 11:52

## 2019-09-11 RX ADMIN — CLONIDINE HYDROCHLORIDE 0.3 MG: 0.1 TABLET ORAL at 05:27

## 2019-09-11 RX ADMIN — PREDNISONE 5 MG: 5 TABLET ORAL at 08:37

## 2019-09-11 RX ADMIN — ASPIRIN 81 MG 81 MG: 81 TABLET ORAL at 08:36

## 2019-09-11 RX ADMIN — HEPARIN SODIUM 5000 UNITS: 5000 INJECTION, SOLUTION INTRAVENOUS; SUBCUTANEOUS at 15:05

## 2019-09-11 RX ADMIN — SODIUM BICARBONATE 650 MG TABLET 1300 MG: at 18:08

## 2019-09-11 RX ADMIN — INSULIN LISPRO 2 UNITS: 100 INJECTION, SOLUTION INTRAVENOUS; SUBCUTANEOUS at 21:22

## 2019-09-11 RX ADMIN — SODIUM CHLORIDE, SODIUM GLUCONATE, SODIUM ACETATE, POTASSIUM CHLORIDE AND MAGNESIUM CHLORIDE 100 ML/HR: 526; 502; 368; 37; 30 INJECTION, SOLUTION INTRAVENOUS at 21:24

## 2019-09-11 RX ADMIN — CLONIDINE HYDROCHLORIDE 0.3 MG: 0.1 TABLET ORAL at 15:04

## 2019-09-11 RX ADMIN — DOXAZOSIN 2 MG: 2 TABLET ORAL at 21:21

## 2019-09-11 NOTE — PHYSICAL THERAPY NOTE
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 ([facility] )    Essential hypertension    Anemia    Chronic diastolic congestive heart failure (HCC)    Atrial fibrillation (HCC)    Multiple myeloma not having achieved remission ([facility] )    Acute renal failure superimposed on stage 4 chronic kidney disease ([facility] )    Acute metabolic encephalopathy       Past Medical History  Past Medical History:   Diagnosis Date    Abnormal liver function test     Acute kidney injury ([facility] )     Acute on chronic congestive heart failure (HCC)     Allergic urticaria     Anemia     Cancer (HCC)     Multiple myeloma    Cervical dysplasia     Cholelithiasis     Chronic diastolic (congestive) heart failure ([facility] ) 9/18/2017    Diabetes mellitus (HCC)     Previous, controlled with diet    Diabetes mellitus with foot ulcer ([facility]     Disease of thyroid gland     Encephalopathy     Hematuria     + leak est- secondary to UTIs/panc drainage    History of transfusion     Hyperkalemia     Hypertension     Iliotibial band syndrome     Lumbar radiculopathy     Multiple myeloma (HCC)     Multiple myeloma (HCC)     Night blindness     Nonrheumatic aortic (valve) insufficiency     Pneumonia     Renal disorder     Retinopathy     Seborrhea     Seizure ([facility]     Shingles     Sinus tachycardia     B blocker - cardio echo stress test 02 normal/neg LE doppler 2/02 OK and 12/07    Status post simultaneous kidney and pancreas transplant ([facility]     Toe amputation status (HCC)     Trochanteric bursitis         Past Surgical History  Past Surgical History:   Procedure Laterality Date    CATARACT EXTRACTION      CHOLECYSTECTOMY      COLONOSCOPY      two polyps in the rectum removed and biopsied diverticulosis in the sigmoid colon, external hemorrhoiods- Dr Barfield    COMBINED KIDNEY-PANCREAS TRANSPLANT N/A     CT BONE MARROW BIOPSY AND ASPIRATION  4/17/2019    CYSTOSCOPY N/A 10/13/2016    Procedure: CYSTOSCOPY, retrograde pyelogram, biopsy of ureteral polyp; Surgeon: Nanda Cronin MD;  Location: [facility];  Service:    Tamra Rivas DILATION AND CURETTAGE OF UTERUS      ESOPHAGOGASTRODUODENOSCOPY N/A 11/20/2017    Procedure: ESOPHAGOGASTRODUODENOSCOPY (EGD); Surgeon: Chrissy Murguia DO;  Location: [facility]; Service: Gastroenterology    EYE SURGERY      cataracts    FOOT AMPUTATION THROUGH METATARSAL Left     FOOT SURGERY Right     excision of metatarsal heads    HALLUX VALGUS CORRECTION Right     NEPHRECTOMY TRANSPLANTED ORGAN      PANCREATIC TRANSPLANT REMOVAL  1998 09/11/19 1116   Note Type   Note type Eval only   Pain Assessment   Pain Assessment No/denies pain   Pain Score No Pain   Home Living   Type of [address]   Additional Comments Pt lives at home alone in an apartment  Pt has 2 JESSI with HR  Pt does not work and drives only during the day  Pt is I for ADL's and mobility  Pt uses cane to ambulate mainly and occ RW when needed      Prior Function   Level of [place] Independent with ADLs and functional mobility   Lives With Alone   Receives Help From Family   ADL Assistance Independent   IADLs Independent   Falls in the last 6 months 0   Vocational On disability   Comments (+) drives during the day only    Restrictions/Precautions   Weight Bearing Precautions Per Order No   Other Precautions Fall Risk  (L sneaker orthotic)   General   Family/Caregiver Present No   Cognition   Overall Cognitive Status WFL   Attention Within functional limits   Orientation Level Oriented X4   Memory Within functional limits   Following Commands Follows all commands and directions without difficulty   RLE Assessment   RLE Assessment WFL   LLE Assessment   LLE Assessment WFL   Light Touch   RLE Light Touch Impaired   LLE Light Touch Impaired   Transfers   Sit to Stand 5  Supervision   Additional items Assist x 1   Stand to Sit 5  Supervision   Ambulation/Elevation   Gait pattern Shuffling; Foward flexed  (fatigue )   Gait Assistance 5  Supervision   Assistive Device Rolling walker   Distance [id number], 20ftx1, 80ftx1   Stair Management Assistance 4  Minimal assist   Additional items Assist x 1   Stair Management Technique One rail L;One rail R   Number of Stairs 3   Balance   Static Sitting Good   Dynamic Sitting Fair +   Static Standing Fair   Dynamic Standing Fair   Ambulatory Fair   Endurance Deficit   Endurance Deficit Yes   Activity Tolerance   Activity Tolerance Patient limited by fatigue   Nurse Made Aware nurse approved therapy session   Assessment   Prognosis Good   Problem List Decreased strength;Decreased endurance; Impaired balance;Decreased mobility   Assessment Pt is a 52 yo male admitted to [place] [address] on 9/9/2019 difficulty urinating and feels like a change in the mental status  Two patient identifiers were used to confirm  DX/PMH: acute renal failure superimposed on stage 3 chronic kidney disease, UTI, acute metabolic encephalopathy, multiple myeloma not having achieved remission, hypertension, DM, L LE transmetarsal ampuation  Pt lives alone in a 1st floor apartment  Pt has 3 JESSI with HR  Pt is I for ADL's and mobility with SPC and occ RW  Pt does not work and is on disability and does drive but only during the day  Pt's impairments include reduced mobility, limited endurance with minimal activity and fall risk  These impairments limit the ability of the patient to perform mobility without increased assistance, return to PLOF and participate in everyday life activities  Pt would benefit from continued skilled therapy while in the hospital to improve overall mobility and return to PLOF with a reduced risk of falling   Recommend discharge to home with out patient PT if she is able to drive herself or get a ride from family  At the end of the session the patient was left in seated position with call bell and phone within reach  Pt educated about staying mobile with the assistance from staff  Goals   STG Expiration Date 09/21/19   Short Term Goal #1 STG 1: Pt will perform transfers at an MI level to return to PLOF  STG 2: Pt will ambulate 300ft least restrictive device at a MI level to reduce the level of assistance needed upon d c home  STG 3: Pt will negotiate 3 steps with HR at a MI level  Treatment Day 0   Plan   Treatment/Interventions Functional transfer training;LE strengthening/ROM; Elevations; Therapeutic exercise; Endurance training;Gait training;Bed mobility   PT Frequency   (3-5xwk)   Recommendation   Recommendation Outpatient PT   Equipment Recommended Walker   PT - OK to Discharge No   Additional Comments need to ambualate futher and practice steps    Modified Keyes Scale   Modified Keyes Scale 2   Barthel Index   Feeding 10   Bathing 0   Grooming Score 0   Dressing Score 5   Bladder Score 10   Bowels Score 10   Toilet Use Score 10   Transfers (Bed/Chair) Score 10   Mobility (Level Surface) Score 10   Stairs Score 5   Barthel Index Score 70   Loida Stanley, Pt, DPT

## 2019-09-11 NOTE — PLAN OF CARE
Problem: PHYSICAL THERAPY ADULT  Goal: Performs mobility at highest level of function for planned discharge setting  See evaluation for individualized goals  Description    Outcome: Progressing  Note:   Prognosis: Good  Problem List: Decreased strength, Decreased endurance, Impaired balance, Decreased mobility  Assessment: Pt is a 52 yo male admitted to [place] [address] on 9/9/2019 difficulty urinating and feels like a change in the mental status  Two patient identifiers were used to confirm  DX/PMH: acute renal failure superimposed on stage 3 chronic kidney disease, UTI, acute metabolic encephalopathy, multiple myeloma not having achieved remission, hypertension, DM, L LE transmetarsal ampuation  Pt lives alone in a 1st floor apartment  Pt has 3 JESSI with HR  Pt is I for ADL's and mobility with SPC and occ RW  Pt does not work and is on disability and does drive but only during the day  Pt's impairments include reduced mobility, limited endurance with minimal activity and fall risk  These impairments limit the ability of the patient to perform mobility without increased assistance, return to PLOF and participate in everyday life activities  Pt would benefit from continued skilled therapy while in the hospital to improve overall mobility and return to PLOF with a reduced risk of falling  Recommend discharge to home with out patient PT  At the end of the session the patient was left in seated position with call bell and phone within reach  Pt educated about staying mobile with the assistance from staff  Recommendation: Outpatient PT     PT - OK to Discharge: No    See flowsheet documentation for full assessment

## 2019-09-11 NOTE — PROGRESS NOTES
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, eosinophils 14%, urine eosinophils pending  · Ultrasound of transplant kidney with Doppler:  No hydronephrosis or renal vein thrombosis noted  Elevated arterial resistive indices  · Revlimid has a side effect of AIN  · Bladder scan Postvoid residual volume 55ml     CKD stage 4   · Status post renal and pancreatic transplant 1998  · Immunosuppression medications include tacrolimus 3 mg in the a m , 2 mg in p m , prednisone 5 mg  Goal tacrolimus level 4, most recent 5 1, yesterday pending  · 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 versus arteriolar/hypertensive nephrosclerosis  · Baseline creatinine 2, nephrologist noted increasing creatinine 4 4 in outpatient labs, recommended patient to come to ED  · Creatinine today 3 47, responding to fluids well  Will discuss with oncologist regarding her Revlimid treatment      UTI, recurrent  · History of recurring UTI/pyelonephritis, E  Coli  · UA was 2+ protein positive nitrates, positive white blood cells, positive leukocytes, moderate bacteria, urine culture pending  · Continue cefepime IV, urine culture from last month showed pansusceptibility  · Pt currently asymptomatic, resolved overnight     Non anion gap Metabolic acidosis  · ABG on admission:  PH 7 1    Most recent ABG showed pH 7 25  · Bicarb today 20  · Likely due to chronic renal failure  · Improving, patient responding to bicarb fluids, continue to monitor  · Alert and oriented x3  · Urine anion gap +, could be possible RTA, continue current management       Electrolytes  · Na 140, K+ 3 4, 20meq KCl given this morning     Hypertension/volume status  · 141-182/55-80 over last 24 hours, most recent 167/56  · Reviewed hold parameters for blood pressure medications, continue to monitor  · Patient appears to be euvolemic, denies shortness of breath      Anemia  · Hemoglobin 7 5, baseline seems to be around 9-10  · Multifactorial, CKD stage 4 versus multiple myeloma versus chemotherapy  · Continue to monitor, transfuse as needed     Bone mineral disease  · Magnesium 2 4, phosphorus 7 7, likely increased due to EDUARDO on CKD  · Continue sevlamer     Other disorders:  Multiple myeloma  ·  Patient diagnosed with multiple myeloma in 2019 from smoldering myeloma diagnosed in 2017  Follows with Dr Deangelo Bacon  Patient currently on Revlimid 5 mg every other day 3 weeks on 1 week off  Recommend stopping Revlimid for now if possible      Hypothyroidism  Aortic regurgitation  Diastolic congestive heart failure           SUBJECTIVE   Richelle Grim 53 y o  female who had no acute events overnight  States her urinary symptoms of dysuria, increased urgency and frequency have resolved completely overnight  Denies any nausea, vomiting, diarrhea, constipation, shortness of breath, chest pain, or fevers or chills overnight  Patient is concerned about her chemotherapy regimen, informed her we will be discussing with her oncologist Dr Mary Thakur      REVIEW OF SYSTEMS   Review of Systems  OBJECTIVE     Vitals:    19 0527 19 0527 19 0703 19 0836   BP: (!) 173/59 (!) 173/59 168/58 167/56   Pulse:  59 59 62   Resp:   16    Temp:   98 2 °F (36 8 °C)    TempSrc:       SpO2:  99% 98%    Weight:       Height:          Temperature:   Temp (24hrs), Av 2 °F (36 8 °C), Min:98 °F (36 7 °C), Max:98 4 °F (36 9 °C)    Temperature: 98 2 °F (36 8 °C)  Intake & Output:  I/O 09/09 0701 - 09/10 0700 09/10 0701 - 09/11 0700 09/11 0701 - 09/12 0700    P  O   776     I V  (mL/kg)  970 (8 6)     IV Piggyback 50 50     Total Intake(mL/kg) 50 (0 4) 1796 (15 9)     Urine (mL/kg/hr)  1350 (0 5)     Stool  0     Total Output  1350     Net +50 +446            Unmeasured Urine Occurrence 1 x          Weights:   IBW: 63 9 kg    Body mass index is 37 88 kg/m²    Weight (last 2 days)     Date/Time   Weight    09/10/19 0300   113 (249 12)            Physical Exam  PAST MEDICAL HISTORY     Past Medical History:   Diagnosis Date    Abnormal liver function test     Acute kidney injury ([facility] [address] )     Acute on chronic congestive heart failure (HCC)     Allergic urticaria     Anemia     Cancer (HCC)     Multiple myeloma    Cervical dysplasia     Cholelithiasis     Chronic diastolic (congestive) heart failure ([facility] ) 9/18/2017    Diabetes mellitus (HCC)     Previous, controlled with diet    Diabetes mellitus with foot ulcer ([address] )     Disease of thyroid gland     Encephalopathy     Hematuria     + leak est- secondary to UTIs/panc drainage    History of transfusion     Hyperkalemia     Hypertension     Iliotibial band syndrome     Lumbar radiculopathy     Multiple myeloma (HCC)     Multiple myeloma (HCC)     Night blindness     Nonrheumatic aortic (valve) insufficiency     Pneumonia     Renal disorder     Retinopathy     Seborrhea     Seizure ([address] )     Shingles     Sinus tachycardia     B blocker - cardio echo stress test 02 normal/neg LE doppler 2/02 OK and 12/07    Status post simultaneous kidney and pancreas transplant ([address] )     Toe amputation status (HCC)     Trochanteric bursitis      PAST SURGICAL HISTORY     Past Surgical History:   Procedure Laterality Date    CATARACT EXTRACTION      CHOLECYSTECTOMY      COLONOSCOPY      two polyps in the rectum removed and biopsied diverticulosis in the sigmoid colon, external hemorrhoiods- Dr Barfield    COMBINED KIDNEY-PANCREAS TRANSPLANT N/A     CT BONE MARROW BIOPSY AND ASPIRATION  2019    CYSTOSCOPY N/A 10/13/2016    Procedure: CYSTOSCOPY, retrograde pyelogram, biopsy of ureteral polyp; Surgeon: Dimas Nuñez MD;  Location: [facility];  Service:    Mary Quiñonez DILATION AND CURETTAGE OF UTERUS      ESOPHAGOGASTRODUODENOSCOPY N/A 2017    Procedure: ESOPHAGOGASTRODUODENOSCOPY (EGD); Surgeon: Frances Perkins DO;  Location: [facility]; Service: Gastroenterology    EYE SURGERY      cataracts    FOOT AMPUTATION THROUGH METATARSAL Left     FOOT SURGERY Right     excision of metatarsal heads    HALLUX VALGUS CORRECTION Right     NEPHRECTOMY TRANSPLANTED ORGAN      PANCREATIC TRANSPLANT REMOVAL       SOCIAL & FAMILY HISTORY     Social History     Substance and Sexual Activity   Alcohol Use Never    Frequency: Never    Binge frequency: Never    Comment: (history)     Social History     Substance and Sexual Activity   Drug Use Never    Types: Hydrocodone    Comment: Past cocaine use many years ago - no current use     Social History     Tobacco Use   Smoking Status Former Smoker    Last attempt to quit: 2012    Years since quittin 3   Smokeless Tobacco Never Used     Family History   Problem Relation Age of Onset    Hypertension Mother     Cancer Mother     Hypertension Father     Cancer Father     Cancer Maternal Grandfather     Cancer Paternal Grandmother     Cancer Paternal Grandfather     Depression Sister     Breast cancer Maternal Grandmother 77     LABORATORY DATA     Labs: I have personally reviewed pertinent reports      Results from last 7 days   Lab Units 19  0537 09/10/19  0504 19  2149 19  0859   WBC Thousand/uL 7 42 6 98  --  6 50   HEMOGLOBIN g/dL 7 5* 7 6*  --  8 2*   HEMATOCRIT % 24 5* 25 5*  --  28 5*   PLATELETS Thousands/uL 202 207 201 209   NEUTROS PCT %  --  60  --   --    MONOS PCT %  --  9  --   --    MONO PCT %  --   --   --  4    Results from last 7 days   Lab Units 09/11/19  0537 09/10/19  0504 09/09/19  0859   POTASSIUM mmol/L 3 4* 3 9 4 4   CHLORIDE mmol/L 107 111* 112*   CO2 mmol/L 20* 15* 12*   BUN mg/dL 63* 78* 85*   CREATININE mg/dL 3 47* 4 15* 4 42*   CALCIUM mg/dL 7 7* 6 9* 7 6*   ALK PHOS U/L  --   --  127*   ALT U/L  --   --  26   AST U/L  --   --  23     Results from last 7 days   Lab Units 09/09/19  0859   MAGNESIUM mg/dL 2 4     Results from last 7 days   Lab Units 09/09/19  0859   PHOSPHORUS mg/dL 7 7*          Results from last 7 days   Lab Units 09/09/19  2149   LACTIC ACID mmol/L 0 4*         Micro:  Lab Results   Component Value Date    BLOODCX No Growth After 5 Days  11/16/2017    BLOODCX No Growth After 5 Days  09/13/2017    BLOODCX No Growth After 5 Days  09/13/2017    URINECX >100,000 cfu/ml Escherichia coli (A) 08/14/2019    URINECX >100,000 cfu/ml Escherichia coli (A) 01/24/2019    URINECX >100,000 cfu/ml Escherichia coli (A) 11/26/2018     IMAGING & DIAGNOSTIC TESTS     Imaging: I have personally reviewed pertinent reports  Us Transplant Kidney With Doppler    Result Date: 9/9/2019  Impression: 1  Elevated arterial resistive indices in the transplant kidney up to 0 8 concerning for possible development of transplant rejection or ATN  Nephrology consult recommended  No hydronephrosis or renal vein thrombosis identified  2  Atrophic native kidneys  Workstation performed: [id number]     EKG, Pathology, and Other Studies: I have personally reviewed pertinent reports  ALLERGIES     Allergies   Allergen Reactions    Ixazomib Rash     Ninlaro    Revlimid [Lenalidomide] Rash    Cefadroxil     Latex Rash    Morphine Other (See Comments)     Other reaction(s): Other (See Comments)  projectile vomiting    Morphine And Related GI Intolerance    Myrbetriq [Mirabegron] Hives    Penicillins Hives     Other reaction(s):  Other (See Comments)  Respiratory Distress,hives  Tolerates cefazolin     MEDICATIONS PRIOR TO ARRIVAL     Prior to Admission medications    Medication Sig Start Date End Date Taking?  Authorizing Provider   amLODIPine (NORVASC) 10 mg tablet TAKE 1 TABLET(10MG) BY MOUTH EVERY MORNING AND 1/2 TABLET(5MG) EVERY EVENING 4/8/19  Yes Shalonda Christy MD   ARIPiprazole (ABILIFY) 30 mg tablet Take 1 tablet (30 mg total) by mouth daily at bedtime for 180 days 6/5/19 12/2/19 Yes Veronique Boo MD   aspirin 81 MG tablet Take 1 tablet by mouth daily 6/5/13  Yes Historical Provider, MD   busPIRone (BUSPAR) 5 mg tablet Take 1 tablet (5 mg total) by mouth 2 (two) times a day for 180 days 6/5/19 12/2/19 Yes Veronique Boo MD   Cholecalciferol (VITAMIN D3) 1000 units CAPS Take 3 capsules by mouth daily     Yes Historical Provider, MD   cloNIDine (CATAPRES) 0 1 mg tablet TAKE 3 TABLETS BY MOUTH EVERY MORNING, 3 TABLETS AT NOON, AND 3 TABLETS EVERY EVENING 5/26/19  Yes MER Gomez   doxazosin (CARDURA) 1 mg tablet TAKE 2 TABLETS BY MOUTH EVERY NIGHT AT BEDTIME 8/19/19  Yes Shalonda Christy MD   DULoxetine (CYMBALTA) 60 mg delayed release capsule Take 1 capsule (60 mg total) by mouth 2 (two) times a day for 180 days 6/5/19 12/2/19 Yes Veronique Boo MD   folic acid (FOLVITE) 1 mg tablet TAKE 1 TABLET BY MOUTH DAILY AS DIRECTED 2/22/19  Yes Bob Pino MD   furosemide (LASIX) 20 mg tablet TAKE 1 TABLET BY MOUTH EVERY DAY 7/20/19  Yes Shalonda Christy MD   hydrALAZINE (APRESOLINE) 50 mg tablet TAKE 1 TABLET(50MG) BY MOUTH THREE TIMES DAILY FOR 90 DAYS 7/20/19  Yes Shalonda Christy MD   Insulin Glargine (TOUJEO SOLOSTAR) 300 units/mL CONCETRATED U-300 injection pen Inject 1 Units under the skin daily at bedtime 1/10/19  Yes Araceli Nguyễn MD   lenalidomide (REVLIMID) 5 MG CAPS Take 5 mg every other day for 21 days on and 7 days off [facility]#[id number] 8/22/19 8/22/19  Yes Zev Adame PA-C   levothyroxine 125 mcg tablet Take 1 tablet (125 mcg total) by mouth daily 6/24/19  Yes Nya Lutz PA-C   metoprolol tartrate (LOPRESSOR) 50 mg tablet TAKE 1 TABLET(50MG TOTAL) BY MOUTH TWICE DAILY 7/9/18  Yes Emmett Kerns MD   pravastatin (PRAVACHOL) 80 mg tablet TAKE 1 TABLET BY MOUTH DAILY 1/29/18  Yes Yoly Mondragon MD   predniSONE 5 mg tablet Take 1 tablet (5 mg total) by mouth daily 5/24/19  Yes MER Nieto   rOPINIRole (REQUIP) 0 25 mg tablet TAKE 1 TABLET BY MOUTH TWICE DAILY 8/18/19  Yes Yoly Mondragon MD   sertraline (ZOLOFT) 100 mg tablet Take 2 tablets (200 mg total) by mouth daily for 180 days 6/5/19 12/2/19 Yes Mary Rasmussen MD   sevelamer carbonate (RENVELA) 800 mg tablet Take 1 tablet (800 mg total) by mouth 3 (three) times a day with meals 7/18/19  Yes Emmett Kerns MD   sodium bicarbonate 650 mg tablet TAKE 2 TABLETS BY MOUTH THREE TIMES DAILY 5/28/19  Yes Emmett Kerns MD   tacrolimus (PROGRAF) 1 mg capsule Take 3 mg in AM and 2 mg in PM (6/18/19) 6/18/19  Yes Emmett Kerns MD   insulin lispro (HUMALOG KWIKPEN) 100 units/mL injection pen INJECT 10 UNDER THE SKIN EVERY DAY OR AS DIRECTED  Patient taking differently: INJECT 10 UNDER THE SKIN EVERY DAY OR AS DIRECTED 5/21/19   Jez Pat MD   Insulin Pen Needle (BD PEN NEEDLE VISHNU U/F) 32G X 4 MM MISC Use 4 times daily 8/23/18   Jez Pat MD   Lancets (ONETOUCH ULTRASOFT) lancets by Does not apply route 4 (four) times a day   3/9/16   Historical Provider, MD   LORazepam (ATIVAN) 2 mg tablet Take 1 tablet (2 mg total) by mouth 3 (three) times a day as needed for anxiety for up to 120 days To be filled on or after 6/29/19 6/29/19 10/27/19  Mary Rasmussen MD   ONE TOUCH ULTRA TEST test strip Use 4 times daily ( please dispense One touch Ultra test strips) 7/9/19   Jez Pat MD     MEDICATIONS ADMINISTERED IN LAST 24 HOURS     Medication Administration - last 24 hours from 09/10/2019 0839 to 09/11/2019 0839       Date/Time Order Dose Route Action Action by     09/10/2019 2227 ARIPiprazole (ABILIFY) tablet 30 mg 30 [facility]     09/11/2019 8691 aspirin chewable tablet 81 mg 81 mg Oral Given Lolis Choe, RN     09/10/2019 0848 aspirin chewable tablet 81 mg 81 mg Oral Given Lolis Choe, RN     09/11/2019 0527 cloNIDine (CATAPRES) tablet 0 3 mg 0 3 mg Oral Given [facility], RN     09/10/2019 2222 cloNIDine (CATAPRES) tablet 0 3 mg 0 3 mg [facility]     09/10/2019 1539 cloNIDine (CATAPRES) tablet 0 3 mg 0 3 mg Oral Given Lolis Choe, RN     09/11/2019 0836 amLODIPine (NORVASC) tablet 10 mg 10 mg Oral Given Lolis Choe, RN     09/10/2019 0849 amLODIPine (NORVASC) tablet 10 mg 10 mg Oral Given Lolis Choe, RN     09/10/2019 2224 doxazosin (CARDURA) tablet 2 mg 2 mg [facility]     09/11/2019 0836 DULoxetine (CYMBALTA) delayed release capsule 60 mg 60 mg Oral Given Lolis Choe, RN     09/10/2019 1734 DULoxetine (CYMBALTA) delayed release capsule 60 mg 60 mg Oral Given Lolis Choe, RN     09/10/2019 0847 DULoxetine (CYMBALTA) delayed release capsule 60 mg 60 mg Oral Given Lolis Choe, RN     01/72/1026 5111 folic acid (FOLVITE) tablet 1,000 mcg 1,000 mcg Oral Given Lolis Choe, RN     53/14/8236 6096 folic acid (FOLVITE) tablet 1,000 mcg 1,000 mcg Oral Given Lolis Choe, RN     09/11/2019 0528 hydrALAZINE (APRESOLINE) tablet 50 mg 50 mg [facility]     09/10/2019 2221 hydrALAZINE (APRESOLINE) tablet 50 mg 50 mg [facility]     09/10/2019 1539 hydrALAZINE (APRESOLINE) tablet 50 mg 50 mg Oral Given Lolis Choe, RN     09/11/2019 0526 levothyroxine tablet 125 mcg 125 mcg Oral Given [facility], RN     09/11/2019 0836 metoprolol tartrate (LOPRESSOR) tablet 50 mg 50 mg Oral Given Lolis Choe, MICHLELE     09/10/2019 2018 metoprolol tartrate (LOPRESSOR) tablet 50 mg 50 mg Oral Given Dodie Noriega RN     09/10/2019 0849 metoprolol tartrate (LOPRESSOR) tablet 50 mg 50 mg Oral Given Nikolai Daniel Bethany Lorenzo, RN     09/11/2019 0837 predniSONE tablet 5 mg 5 mg Oral Given Jeannie Baker, RN     09/10/2019 0848 predniSONE tablet 5 mg 5 mg Oral Given Jeannie Baker, RN     09/11/2019 0838 rOPINIRole (REQUIP) tablet 0 25 mg 0 25 mg Oral Given Jeannie Baker, RN     09/10/2019 1735 rOPINIRole (REQUIP) tablet 0 25 mg 0 25 mg Oral Given Jeannie Baker, RN     09/10/2019 0850 rOPINIRole (REQUIP) tablet 0 25 mg 0 25 mg Oral Given Jeannie Baker, RN     09/11/2019 0836 sertraline (ZOLOFT) tablet 200 mg 200 mg Oral Given Jeannie Baker, RN     09/10/2019 0847 sertraline (ZOLOFT) tablet 200 mg 200 mg Oral Given Jeannie Baker, RN     09/11/2019 0836 sevelamer (RENAGEL) tablet 800 mg 800 mg Oral Given Jeannie Baker, RN     09/10/2019 1734 sevelamer (RENAGEL) tablet 800 mg 800 mg Oral Given Jeannie Baker, RN     09/10/2019 1326 sevelamer (RENAGEL) tablet 800 mg 800 mg Oral Given Jeannie Baker, RN     09/10/2019 0847 sevelamer (RENAGEL) tablet 800 mg 800 mg Oral Given Jeannie Baker, RN     09/11/2019 0836 sodium bicarbonate tablet 1,300 mg 1,300 mg Oral Given Jeannie Baker, RN     09/10/2019 2017 sodium bicarbonate tablet 1,300 mg 1,300 mg Oral Given Zane Huddleston, RN     09/10/2019 1734 sodium bicarbonate tablet 1,300 mg 1,300 mg Oral Given Jeannie Baker, RN     09/10/2019 0847 sodium bicarbonate tablet 1,300 mg 1,300 mg Oral Given Jeannie Baker, RN     09/10/2019 0849 tacrolimus (PROGRAF) capsule 3 mg 3 mg Oral Given Jeannie Baker, RN     09/10/2019 2225 tacrolimus (PROGRAF) capsule 2 mg 2 mg Oral Given Zane Huddleston, MICHELLE     09/11/2019 0427 sodium bicarbonate 150 mEq in dextrose 5 % 1,000 mL infusion 125 mL/hr Intravenous [address] Zane Huddleston, MICHELLE     09/10/2019 2013 sodium bicarbonate 150 mEq in dextrose 5 % 1,000 mL infusion 125 mL/hr Intravenous [address] Zane Huddleston RN     09/10/2019 2012 sodium bicarbonate 150 mEq in dextrose 5 % 1,000 mL infusion 0 mL/hr Intravenous Stopped Zane Huddleston RN     09/10/2019 0917 sodium bicarbonate 150 mEq in dextrose 5 % 1,000 mL infusion 125 mL/hr Intravenous New Bag Marcelina Garcia, RN     09/10/2019 3084 sodium bicarbonate 150 mEq in dextrose 5 % 1,000 mL infusion 0 mL/hr Intravenous Stopped Marcelina Garcia, RN     09/11/2019 0526 heparin (porcine) subcutaneous injection 5,000 Units 5,000 Units Subcutaneous Given Wade Brendan, RN     09/10/2019 2223 heparin (porcine) subcutaneous injection 5,000 Units 5,000 Units Subcutaneous Given Wade Brendan, RN     09/10/2019 1326 heparin (porcine) subcutaneous injection 5,000 Units 5,000 Units Subcutaneous Given Marcelina Garcia, RN     09/11/2019 0836 insulin lispro (HumaLOG) 100 units/mL subcutaneous injection 1-6 Units 1 Units Subcutaneous Given Marcelina Garcia, RN     09/10/2019 1641 insulin lispro (HumaLOG) 100 units/mL subcutaneous injection 1-6 Units 1 Units Subcutaneous Not Given Marcelina Garcia, RN     09/10/2019 1327 insulin lispro (HumaLOG) 100 units/mL subcutaneous injection 1-6 Units 1 Units Subcutaneous Given Marcelina Garcia, RN     09/10/2019 0848 insulin lispro (HumaLOG) 100 units/mL subcutaneous injection 1-6 Units 1 Units Subcutaneous Given Marcelina Garcia, RN     09/10/2019 2223 insulin lispro (HumaLOG) 100 units/mL subcutaneous injection 1-6 Units 1 Units Subcutaneous Not Given Wade Brendan, RN     09/10/2019 1830 cefepime (MAXIPIME) 500 mg in sodium chloride 0 9 % 50 mL IVPB 0 mg Intravenous Stopped Marcelina Garcia, RN     09/10/2019 1735 cefepime (MAXIPIME) 500 mg in sodium chloride 0 9 % 50 mL IVPB 500 mg Intravenous New Bag Marcelina Garcia, RN     09/11/2019 0043 hydrALAZINE (APRESOLINE) injection 5 mg 5 mg Intravenous Given Wade Brendan, RN     09/11/2019 9868 potassium chloride (K-DUR,KLOR-CON) CR tablet 20 mEq 20 mEq Oral Given Marcelina Garcia RN        CURRENT MEDICATIONS     Current Facility-Administered Medications:  acetaminophen 650 mg Oral Q6H PRN Nora Murillo MD    amLODIPine 10 mg Oral Daily Nora Murillo MD    ARIPiprazole 30 mg Oral  Jania Alarcon MD    aspirin 81 mg Oral Daily Jania Alarcon MD    cefepime 500 mg Intravenous Q24H Jania Alarcon MD Last Rate: Stopped (09/10/19 1830)   cloNIDine 0 3 mg Oral Q8H [address] Jania Alarcon MD    doxazosin 2 mg Oral HS Jania Alarcon MD    DULoxetine 60 mg Oral BID Jania Alarcon MD    folic acid 3,408 mcg Oral Daily Jania Alarcon MD    heparin (porcine) 5,000 Units Subcutaneous [facility] Jania Alarcon MD    hydrALAZINE 50 mg Oral Q8H [address] Jania Alarcon MD    insulin lispro 1-6 Units Subcutaneous TID AC Jania Alarcon MD    insulin lispro 1-6 Units Subcutaneous  Jania Alarcon MD    levothyroxine 125 mcg Oral Daily Jania Alarcon MD    LORazepam 1 mg Oral TID PRN Jania Alarcon MD    metoprolol tartrate 50 mg Oral Q12H Evangelista Peters MD    predniSONE 5 mg Oral Daily Jania Alarcon MD    rOPINIRole 0 25 mg Oral BID Jania Alarcon MD    sertraline 200 mg Oral Daily Jania Alarcon MD    sevelamer 800 mg Oral TID With Meals Jania Alarcon MD    sodium bicarbonate infusion 125 mL/hr Intravenous Continuous Layla Bruno MD Last Rate: 125 mL/hr (09/11/19 0427)   sodium bicarbonate 1,300 mg Oral TID Jania Alarcon MD    tacrolimus 2 mg Oral HS Jania Alarcon MD    tacrolimus 3 mg Oral QAM Jania Alarcon MD        sodium bicarbonate infusion 125 mL/hr Last Rate: 125 mL/hr (09/11/19 0427)       acetaminophen 650 mg Q6H PRN   LORazepam 1 mg TID PRN       Portions of the record may have been created with voice recognition software  Occasional wrong word or "sound a like" substitutions may have occurred due to the inherent limitations of voice recognition software    Read the chart carefully and recognize, using context, where substitutions have occurred     ==  Kolton Lopez, [address] [facility]  Internal Medicine Residency

## 2019-09-11 NOTE — PLAN OF CARE
Problem: OCCUPATIONAL THERAPY ADULT  Goal: Performs self-care activities at highest level of function for planned discharge setting  See evaluation for individualized goals  Description  Treatment Interventions: ADL retraining, Functional transfer training, Endurance training, Patient/family training, Compensatory technique education, Activityengagement, Energy conservation, Continued evaluation          See flowsheet documentation for full assessment, interventions and recommendations  Note:   Limitation: Decreased ADL status, Decreased cognition, Decreased endurance, Decreased self-care trans, Decreased high-level ADLs  Prognosis: Good  Assessment: Patient participated in Skilled OT session this date with interventions consisting of MOCA/cognitive testing -pt scoring a 20/30 indicating a mild cognitive impairment-per hospital in CM rounds re-evaluate prior to discharge  Patient agreeable to OT treatment session, upon arrival patient was found seated at edge of bed  In comparison to previous session, patient with improvements in activity tolerance  Patient continues to be functioning below baseline level, occupational performance remains limited secondary to factors listed above and increased risk for falls and injury  From OT standpoint, recommendation at time of d/c would be Home with family supportpending cognitive status (does live alone and drives)  Patient to benefit from continued Occupational Therapy treatment while in the hospital to address deficits as defined above and maximize level of functional independence with ADLs and functional mobility        OT Discharge Recommendation: (home with family support pending cogntive re-evaluation)  OT - OK to Discharge: No

## 2019-09-11 NOTE — PLAN OF CARE
Problem: Potential for Falls  Goal: Patient will remain free of falls  Description  INTERVENTIONS:  - Assess patient frequently for physical needs  -  Identify cognitive and physical deficits and behaviors that affect risk of falls  -  [place] fall precautions as indicated by assessment   - Educate patient/family on patient safety including physical limitations  - Instruct patient to call for assistance with activity based on assessment  - Modify environment to reduce risk of injury  - Consider OT/PT consult to assist with strengthening/mobility  Outcome: Progressing     Problem: Nutrition/Hydration-ADULT  Goal: Nutrient/Hydration intake appropriate for improving, restoring or maintaining nutritional needs  Description  Monitor and assess patient's nutrition/hydration status for malnutrition  Collaborate with interdisciplinary team and initiate plan and interventions as ordered  Monitor patient's weight and dietary intake as ordered or per policy  Utilize nutrition screening tool and intervene as necessary  Determine patient's food preferences and provide high-protein, high-caloric foods as appropriate       INTERVENTIONS:  - Monitor oral intake, urinary output, labs, and treatment plans  - Assess nutrition and hydration status and recommend course of action  - Evaluate amount of meals eaten  - Assist patient with eating if necessary   - Allow adequate time for meals  - Recommend/ encourage appropriate diets, oral nutritional supplements, and vitamin/mineral supplements  - Order, calculate, and assess calorie counts as needed  - Recommend, monitor, and adjust tube feedings and TPN/PPN based on assessed needs  - Assess need for intravenous fluids  - Provide specific nutrition/hydration education as appropriate  - Include patient/family/caregiver in decisions related to nutrition  Outcome: Progressing     Problem: PAIN - ADULT  Goal: Verbalizes/displays adequate comfort level or baseline comfort level  Description  Interventions:  - Encourage patient to monitor pain and request assistance  - Assess pain using appropriate pain scale  - Administer analgesics based on type and severity of pain and evaluate response  - Implement non-pharmacological measures as appropriate and evaluate response  - Consider cultural and social influences on pain and pain management  - Notify physician/advanced practitioner if interventions unsuccessful or patient reports new pain  Outcome: Progressing     Problem: INFECTION - ADULT  Goal: Absence or prevention of progression during hospitalization  Description  INTERVENTIONS:  - Assess and monitor for signs and symptoms of infection  - Monitor lab/diagnostic results  - Monitor all insertion sites, i e  indwelling lines, tubes, and drains  - Monitor endotracheal if appropriate and nasal secretions for changes in amount and color  - [place] appropriate cooling/warming therapies per order  - Administer medications as ordered  - Instruct and encourage patient and family to use good hand hygiene technique  - Identify and instruct in appropriate isolation precautions for identified infection/condition  Outcome: Progressing  Goal: Absence of fever/infection during neutropenic period  Description  INTERVENTIONS:  - Monitor WBC    Outcome: Progressing     Problem: SAFETY ADULT  Goal: Patient will remain free of falls  Description  INTERVENTIONS:  - Assess patient frequently for physical needs  -  Identify cognitive and physical deficits and behaviors that affect risk of falls    -  [place] fall precautions as indicated by assessment   - Educate patient/family on patient safety including physical limitations  - Instruct patient to call for assistance with activity based on assessment  - Modify environment to reduce risk of injury  - Consider OT/PT consult to assist with strengthening/mobility  Outcome: Progressing  Goal: Maintain or return to baseline ADL function  Description  INTERVENTIONS:  -  Assess patient's ability to carry out ADLs; assess patient's baseline for ADL function and identify physical deficits which impact ability to perform ADLs (bathing, care of mouth/teeth, toileting, grooming, dressing, etc )  - Assess/evaluate cause of self-care deficits   - Assess range of motion  - Assess patient's mobility; develop plan if impaired  - Assess patient's need for assistive devices and provide as appropriate  - Encourage maximum independence but intervene and supervise when necessary  - Involve family in performance of ADLs  - Assess for home care needs following discharge   - Consider OT consult to assist with ADL evaluation and planning for discharge  - Provide patient education as appropriate  Outcome: Progressing  Goal: Maintain or return mobility status to optimal level  Description  INTERVENTIONS:  - Assess patient's baseline mobility status (ambulation, transfers, stairs, etc )    - Identify cognitive and physical deficits and behaviors that affect mobility  - Identify mobility aids required to assist with transfers and/or ambulation (gait belt, sit-to-stand, lift, walker, cane, etc )  - [place] fall precautions as indicated by assessment  - Record patient progress and toleration of activity level on Mobility SBAR; progress patient to next Phase/Stage  - Instruct patient to call for assistance with activity based on assessment  - Consider rehabilitation consult to assist with strengthening/weightbearing, etc   Outcome: Progressing     Problem: DISCHARGE PLANNING  Goal: Discharge to home or other facility with appropriate resources  Description  INTERVENTIONS:  - Identify barriers to discharge w/patient and caregiver  - Arrange for needed discharge resources and transportation as appropriate  - Identify discharge learning needs (meds, wound care, etc )  - Arrange for interpretive services to assist at discharge as needed  - Refer to Case Management Department for coordinating discharge planning if the patient needs post-hospital services based on physician/advanced practitioner order or complex needs related to functional status, cognitive ability, or social support system  Outcome: Progressing     Problem: Knowledge Deficit  Goal: Patient/family/caregiver demonstrates understanding of disease process, treatment plan, medications, and discharge instructions  Description  Complete learning assessment and assess knowledge base    Interventions:  - Provide teaching at level of understanding  - Provide teaching via preferred learning methods  Outcome: Progressing     Problem: COPING  Goal: Pt/Family able to verbalize concerns and demonstrate effective coping strategies  Description  INTERVENTIONS:  - Assist patient/family to identify coping skills, available support systems and cultural and spiritual values  - Provide emotional support, including active listening and acknowledgement of concerns of patient and caregivers  - Reduce environmental stimuli, as able  - Provide patient education  - Assess for spiritual pain/suffering and initiate spiritual care, including notification of Pastoral Care or [place] based community as needed  - Assess effectiveness of coping strategies  Outcome: Progressing

## 2019-09-11 NOTE — ASSESSMENT & PLAN NOTE
Patient was recommended to retry Revlimid 5 mg every other day 3 weeks on and 1 week off    Patient takes it every other day  Continue to hold due to EDUARDO  Outpatient Hematology follow-up

## 2019-09-11 NOTE — ASSESSMENT & PLAN NOTE
with metabolic acidosis  Evaluated by Nephrology, etiology multifactorial with possibility of AIN from use of Revlimid  Improving on IV fluid/bicarb drip  Nephrology is following

## 2019-09-11 NOTE — PROGRESS NOTES
Progress Note - Keisha Palacios 1965, 53 y o  female MRN: [id number]    Unit/Bed#: [facility] 807-01 Encounter: [id number]    Primary Care Provider: Ted Marroquin MD   Date and time admitted to hospital: 9/9/2019  4:37 PM        Acute renal failure superimposed on stage 4 chronic kidney disease ([address] )  Assessment & Plan  with metabolic acidosis  Evaluated by Nephrology, etiology multifactorial with possibility of AIN from use of Revlimid  Improving on IV fluid/bicarb drip  Nephrology is following      * UTI (urinary tract infection)  Assessment & Plan  Suspect recurrent UTI  Prior culture showed pansensitive E coli  No urine culture were sent this time  Continue with IV cefepime day # 3      Acute metabolic encephalopathy  Assessment & Plan  POA  Likely secondary to above  Improving, continue to monitor  PT OT eval      Multiple myeloma not having achieved remission [facility])  Assessment & Plan  Patient was recommended to retry Revlimid 5 mg every other day 3 weeks on and 1 week off  Patient takes it every other day  Continue to hold due to EDUARDO  Outpatient Hematology follow-up    Anemia  Assessment & Plan  Chronic anemia  Monitor      Essential hypertension  Assessment & Plan  Acceptable BP  Continue amlodipine, metoprolol, clonidine, doxazosin and hydralazine      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 [facility])  Assessment & Plan  Lab Results   Component Value Date    HGBA1C 5 1 09/09/2019       Recent Labs     09/10/19  1103 09/10/19  1604 09/10/19  2110 09/11/19  0701   POCGLU 150* 134 142* 157*       Blood Sugar Average: Last 72 hrs:  (P) 141 takes 1 unit of toujeo a daily at home  Diabetic diet/renal diet  Sliding scale coverage  Lantus q h s      Renal transplant recipient  Assessment & Plan  Patient is status post kidney and pancreatic transplant in 1998  Continue Prograf and steroids  Nephrology is following             VTE Pharmacologic Prophylaxis:   Pharmacologic: Heparin  Mechanical VTE Prophylaxis in Place: Yes    Patient Centered Rounds: I have performed bedside rounds with nursing staff today  Discussions with Specialists or Other Care Team Provider:  Renal     Education and Discussions with Family / Patient:  Patient    Time Spent for Care: 30 minutes  More than 50% of total time spent on counseling and coordination of care as described above  Current Length of Stay: 2 day(s)    Current Patient Status: Inpatient   Certification Statement: The patient will continue to require additional inpatient hospital stay due to Above    Discharge Plan / Estimated Discharge Date: TBD    Code Status: Level 1 - Full Code      Subjective:   Patient seen and examined  Comfortable sitting in bed  Clinically improving  No nausea vomiting or diarrhea    Objective:     Vitals:   Temp (24hrs), Av 2 °F (36 8 °C), Min:98 °F (36 7 °C), Max:98 4 °F (36 9 °C)    Temp:  [98 °F (36 7 °C)-98 4 °F (36 9 °C)] 98 2 °F (36 8 °C)  HR:  [55-65] 62  Resp:  [16-18] 16  BP: (141-182)/(55-80) 167/56  SpO2:  [92 %-99 %] 98 %  Body mass index is 37 88 kg/m²  Input and Output Summary (last 24 hours):        Intake/Output Summary (Last 24 hours) at 2019 1042  Last data filed at 2019 1029  Gross per 24 hour   Intake 1456 ml   Output 1150 ml   Net 306 ml       Physical Exam:     Physical Exam  Patient is awake alert in no acute distress  Lung clear to auscultation bilateral  Heart positive S1-S2 no murmur  Abdomen soft nontender  Lower extremities no edema    Additional Data:     Labs:    Results from last 7 days   Lab Units 19  0537 09/10/19  0504   WBC Thousand/uL 7 42 6 98   HEMOGLOBIN g/dL 7 5* 7 6*   HEMATOCRIT % 24 5* 25 5*   PLATELETS Thousands/uL 202 207   NEUTROS PCT %  --  60   LYMPHS PCT %  --  14   MONOS PCT %  --  9   EOS PCT %  --  14*     Results from last 7 days   Lab Units 19  0537  19  0859   POTASSIUM mmol/L 3 4*   < > 4 4   CHLORIDE mmol/L 107   < > 112* CO2 mmol/L 20*   < > 12*   BUN mg/dL 63*   < > 85*   CREATININE mg/dL 3 47*   < > 4 42*   CALCIUM mg/dL 7 7*   < > 7 6*   ALK PHOS U/L  --   --  127*   ALT U/L  --   --  26   AST U/L  --   --  23    < > = values in this interval not displayed  * I Have Reviewed All Lab Data Listed Above  * Additional Pertinent Lab Tests Reviewed:  [address] Admission Reviewed    Imaging:    Imaging Reports Reviewed Today Include:   Imaging Personally Reviewed by Myself Includes:      Recent Cultures (last 7 days):           Last 24 Hours Medication List:     Current Facility-Administered Medications:  acetaminophen 650 mg Oral Q6H PRN Karly Benites MD    amLODIPine 10 mg Oral Daily Karly Benites MD    ARIPiprazole 30 mg Oral HS Karly Benites MD    aspirin 81 mg Oral Daily Karly Benites MD    cefepime 500 mg Intravenous Q24H Karly Benites MD Last Rate: Stopped (09/10/19 1830)   cloNIDine 0 3 mg Oral Q8H Kim Pina MD    doxazosin 2 mg Oral HS Karly Benites MD    DULoxetine 60 mg Oral BID Karly Benites MD    folic acid 1,634 mcg Oral Daily Karly Benites MD    heparin (porcine) 5,000 Units Subcutaneous Q8H [facility] Karly Benites MD    hydrALAZINE 50 mg Oral Q8H Kim Pina MD    insulin lispro 1-6 Units Subcutaneous TID AC Karly Benites MD    insulin lispro 1-6 Units Subcutaneous HS Karly Benites MD    levothyroxine 125 mcg Oral Daily Karly Benites MD    LORazepam 1 mg Oral TID PRN Karly Benites MD    metoprolol tartrate 50 mg Oral Q12H Kim Pina MD    multi-electrolyte 100 mL/hr Intravenous Continuous Orvillerwin Sampson MD Last Rate: 100 mL/hr (09/11/19 1029)   predniSONE 5 mg Oral Daily Karly Benites MD    rOPINIRole 0 25 mg Oral BID Karly Benites MD    sertraline 200 mg Oral Daily Karly Benites MD    sevelamer 800 mg Oral TID With Meals Karly Benites MD    sodium bicarbonate 1,300 mg Oral TID Karly Benites MD    tacrolimus 2 mg Oral HS Karly Benites MD    tacrolimus 3 mg Oral MARY Hernandez MD         Today, Patient Was Seen By: Jaiden Crawford DO    ** Please Note: This note has been constructed using a voice recognition system   **

## 2019-09-11 NOTE — OCCUPATIONAL THERAPY NOTE
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 ([facility] )    Essential hypertension    Anemia    Chronic diastolic congestive heart failure (HCC)    Atrial fibrillation (HCC)    Multiple myeloma not having achieved remission ([facility] )    Acute renal failure superimposed on stage 4 chronic kidney disease ([facility] )    Acute metabolic encephalopathy          09/11/19 1145   Restrictions/Precautions   Weight Bearing Precautions Per Order No   Other Precautions Fall Risk;Telemetry   Lifestyle   Autonomy I ADL's/IADl's, +RW household, +SPC community, +drives during the day   Reciprocal Relationships parents   Service to Others on disability   Intrinsic Gratification art/crafts, coloring   Pain Assessment   Pain Assessment No/denies pain   Pain Score No Pain   ADL   Where Assessed Edge of bed   Cognition   Overall Cognitive Status Impaired-MOCA-see score below   Arousal/Participation Alert; Cooperative   Attention Difficulty attending to directions   Orientation Level Oriented X4   Memory Decreased short term memory/recall   Following Commands Follows all commands and directions without difficulty   Comments pt performed MOCA testing this session with a score of 20/30 indiciating mild cognitive deficits  Hospitalist ,and CM notified in case management rounds, per hospitalist re-evaluate prior to discharge and stating pt continues to need antibotic and this may improve cognition   will continue to formally assess   Cognition Assessment Tools MOCA   Score 20   Activity Tolerance   Activity Tolerance Patient limited by fatigue   Medical Staff Made Aware RN cleared for therapy, CM, hospitalist, PT aware of MOCA score   Assessment   Assessment Patient participated in Skilled OT session this date with interventions consisting of MOCA/cognitive testing -pt scoring a 20/30 indicating a mild cognitive impairment-per hospital in CM rounds re-evaluate prior to discharge  Patient agreeable to OT treatment session, upon arrival patient was found seated at edge of bed  In comparison to previous session, patient with improvements in activity tolerance  Patient continues to be functioning below baseline level, occupational performance remains limited secondary to factors listed above and increased risk for falls and injury  From OT standpoint, recommendation at time of d/c would be Home with family supportpending cognitive status (does live alone and drives)  Patient to benefit from continued Occupational Therapy treatment while in the hospital to address deficits as defined above and maximize level of functional independence with ADLs and functional mobility  Plan   Treatment Interventions ADL retraining;Functional transfer training; Endurance training;Patient/family training;Equipment evaluation/education; Compensatory technique education; Energy conservation; Activityengagement   Goal Expiration Date 09/24/19   Treatment Day 2   OT Frequency 3-5x/wk   Recommendation   OT Discharge Recommendation   (home with family support pending cogntive re-evaluation)   OT - OK to Discharge No       PT SEEN FOR BENJAMIN COGNITIVE ASSESSMENT  SCORED  20/30 INDICATING _____mild______ COGNITIVE IMPAIRMENT FOR AGE/EDUCATION especially in the areas of visual spatial/executive function, attention and recall- SCORES ARE AS FOLLOWS:    VISUOSPATIAL/EXECUTIVE FUNCTION: 2/5  NAMING: 3/3  Pt able to name 3/3 animals  ATTENTION: 5/6  LANGUAGE: 3/3  ABSTRACTION: 2/2  Pt was able to identify the similarity of two items x2 trials  DELAYED RECALL: 0/5  Pt recalled 5/5 words without cues  Pt recalled 3/5 words with category cue  Pt recalled 2 /5 words with multiple choice options  ORIENTATION: 6/6   Pt is oriented to date, month, year, day, place and city       Vickie Mtz MOT, OTR/L

## 2019-09-11 NOTE — ASSESSMENT & PLAN NOTE
Lab Results   Component Value Date    HGBA1C 5 1 09/09/2019       Recent Labs     09/10/19  1103 09/10/19  1604 09/10/19  2110 09/11/19  0701   POCGLU 150* 134 142* 157*       Blood Sugar Average: Last 72 hrs:  (P) 141 takes 1 unit of toujeo a daily at home  Diabetic diet/renal diet  Sliding scale coverage  Lantus q h s

## 2019-09-11 NOTE — ASSESSMENT & PLAN NOTE
Patient is status post kidney and pancreatic transplant in 1998  Continue Prograf and steroids  Nephrology is following

## 2019-09-11 NOTE — SOCIAL WORK
Cm reviewed patient during care coordination rounds with Dr Brenda Ramirez  Patient not stable for discharge today  Patient on IVF  Nephrology following  Recurrent UTI  Patient on IV ABX  PT/OT recommending home therapy at this time pending progress  Cm met with patient to discuss therapy recommendations but patient not agreeable due to apartment set up and also informed Cm that she is packing/moving  Cm informed PT/OT of patient's feedback regarding discharge plan recommendations and PT/OT to re-evaluate  Patient's family to transport home once stable  Cm following

## 2019-09-11 NOTE — ASSESSMENT & PLAN NOTE
Suspect recurrent UTI  Prior culture showed pansensitive E coli    No urine culture were sent this time  Continue with IV cefepime day # 3

## 2019-09-12 LAB
ABO GROUP BLD: NORMAL
ANION GAP SERPL CALCULATED.3IONS-SCNC: 9 MMOL/L (ref 4–13)
BASOPHILS # BLD AUTO: 0.17 THOUSANDS/ΜL (ref 0–0.1)
BASOPHILS NFR BLD AUTO: 3 % (ref 0–1)
BLD GP AB SCN SERPL QL: NEGATIVE
BUN SERPL-MCNC: 64 MG/DL (ref 5–25)
CALCIUM SERPL-MCNC: 7.9 MG/DL (ref 8.3–10.1)
CHLORIDE SERPL-SCNC: 105 MMOL/L (ref 100–108)
CO2 SERPL-SCNC: 22 MMOL/L (ref 21–32)
CREAT SERPL-MCNC: 3.01 MG/DL (ref 0.6–1.3)
EOSINOPHIL # BLD AUTO: 0.83 THOUSAND/ΜL (ref 0–0.61)
EOSINOPHIL NFR BLD AUTO: 13 % (ref 0–6)
ERYTHROCYTE [DISTWIDTH] IN BLOOD BY AUTOMATED COUNT: 16.3 % (ref 11.6–15.1)
FERRITIN SERPL-MCNC: 85 NG/ML (ref 8–388)
GFR SERPL CREATININE-BSD FRML MDRD: 17 ML/MIN/1.73SQ M
GLUCOSE SERPL-MCNC: 104 MG/DL (ref 65–140)
GLUCOSE SERPL-MCNC: 112 MG/DL (ref 65–140)
GLUCOSE SERPL-MCNC: 115 MG/DL (ref 65–140)
GLUCOSE SERPL-MCNC: 117 MG/DL (ref 65–140)
GLUCOSE SERPL-MCNC: 170 MG/DL (ref 65–140)
HCT VFR BLD AUTO: 24.2 % (ref 34.8–46.1)
HGB BLD-MCNC: 7.2 G/DL (ref 11.5–15.4)
IMM GRANULOCYTES # BLD AUTO: 0.1 THOUSAND/UL (ref 0–0.2)
IMM GRANULOCYTES NFR BLD AUTO: 2 % (ref 0–2)
IRON SATN MFR SERPL: 10 %
IRON SERPL-MCNC: 34 UG/DL (ref 50–170)
LYMPHOCYTES # BLD AUTO: 0.97 THOUSANDS/ΜL (ref 0.6–4.47)
LYMPHOCYTES NFR BLD AUTO: 15 % (ref 14–44)
MCH RBC QN AUTO: 28.3 PG (ref 26.8–34.3)
MCHC RBC AUTO-ENTMCNC: 29.8 G/DL (ref 31.4–37.4)
MCV RBC AUTO: 95 FL (ref 82–98)
MONOCYTES # BLD AUTO: 0.76 THOUSAND/ΜL (ref 0.17–1.22)
MONOCYTES NFR BLD AUTO: 12 % (ref 4–12)
NEUTROPHILS # BLD AUTO: 3.62 THOUSANDS/ΜL (ref 1.85–7.62)
NEUTS SEG NFR BLD AUTO: 55 % (ref 43–75)
NRBC BLD AUTO-RTO: 0 /100 WBCS
PHOSPHATE SERPL-MCNC: 4.6 MG/DL (ref 2.7–4.5)
PLATELET # BLD AUTO: 185 THOUSANDS/UL (ref 149–390)
PMV BLD AUTO: 13.5 FL (ref 8.9–12.7)
POTASSIUM SERPL-SCNC: 3.4 MMOL/L (ref 3.5–5.3)
RBC # BLD AUTO: 2.54 MILLION/UL (ref 3.81–5.12)
RH BLD: POSITIVE
SODIUM SERPL-SCNC: 136 MMOL/L (ref 136–145)
SPECIMEN EXPIRATION DATE: NORMAL
TIBC SERPL-MCNC: 340 UG/DL (ref 250–450)
WBC # BLD AUTO: 6.45 THOUSAND/UL (ref 4.31–10.16)

## 2019-09-12 PROCEDURE — 82728 ASSAY OF FERRITIN: CPT | Performed by: INTERNAL MEDICINE

## 2019-09-12 PROCEDURE — 84100 ASSAY OF PHOSPHORUS: CPT | Performed by: INTERNAL MEDICINE

## 2019-09-12 PROCEDURE — P9040 RBC LEUKOREDUCED IRRADIATED: HCPCS

## 2019-09-12 PROCEDURE — 83540 ASSAY OF IRON: CPT | Performed by: INTERNAL MEDICINE

## 2019-09-12 PROCEDURE — 99232 SBSQ HOSP IP/OBS MODERATE 35: CPT | Performed by: INTERNAL MEDICINE

## 2019-09-12 PROCEDURE — 86900 BLOOD TYPING SEROLOGIC ABO: CPT | Performed by: INTERNAL MEDICINE

## 2019-09-12 PROCEDURE — 82948 REAGENT STRIP/BLOOD GLUCOSE: CPT

## 2019-09-12 PROCEDURE — 83550 IRON BINDING TEST: CPT | Performed by: INTERNAL MEDICINE

## 2019-09-12 PROCEDURE — 30233N1 TRANSFUSION OF NONAUTOLOGOUS RED BLOOD CELLS INTO PERIPHERAL VEIN, PERCUTANEOUS APPROACH: ICD-10-PCS | Performed by: INTERNAL MEDICINE

## 2019-09-12 PROCEDURE — 85025 COMPLETE CBC W/AUTO DIFF WBC: CPT | Performed by: INTERNAL MEDICINE

## 2019-09-12 PROCEDURE — 86920 COMPATIBILITY TEST SPIN: CPT

## 2019-09-12 PROCEDURE — 86901 BLOOD TYPING SEROLOGIC RH(D): CPT | Performed by: INTERNAL MEDICINE

## 2019-09-12 PROCEDURE — 80048 BASIC METABOLIC PNL TOTAL CA: CPT | Performed by: INTERNAL MEDICINE

## 2019-09-12 PROCEDURE — 86850 RBC ANTIBODY SCREEN: CPT | Performed by: INTERNAL MEDICINE

## 2019-09-12 RX ORDER — DOXAZOSIN MESYLATE 4 MG/1
4 TABLET ORAL
Status: DISCONTINUED | OUTPATIENT
Start: 2019-09-12 | End: 2019-09-13 | Stop reason: HOSPADM

## 2019-09-12 RX ORDER — FERROUS SULFATE 325(65) MG
325 TABLET ORAL
Status: DISCONTINUED | OUTPATIENT
Start: 2019-09-12 | End: 2019-09-13 | Stop reason: HOSPADM

## 2019-09-12 RX ORDER — ACETAMINOPHEN 325 MG/1
650 TABLET ORAL EVERY 6 HOURS PRN
Status: DISCONTINUED | OUTPATIENT
Start: 2019-09-12 | End: 2019-09-13 | Stop reason: HOSPADM

## 2019-09-12 RX ORDER — LABETALOL 20 MG/4 ML (5 MG/ML) INTRAVENOUS SYRINGE
10 EVERY 6 HOURS PRN
Status: DISCONTINUED | OUTPATIENT
Start: 2019-09-12 | End: 2019-09-13 | Stop reason: HOSPADM

## 2019-09-12 RX ORDER — POTASSIUM CHLORIDE 20 MEQ/1
40 TABLET, EXTENDED RELEASE ORAL ONCE
Status: COMPLETED | OUTPATIENT
Start: 2019-09-12 | End: 2019-09-12

## 2019-09-12 RX ORDER — POLYETHYLENE GLYCOL 3350 17 G/17G
17 POWDER, FOR SOLUTION ORAL DAILY
Status: DISCONTINUED | OUTPATIENT
Start: 2019-09-12 | End: 2019-09-13 | Stop reason: HOSPADM

## 2019-09-12 RX ORDER — AMOXICILLIN 250 MG
1 CAPSULE ORAL 2 TIMES DAILY
Status: DISCONTINUED | OUTPATIENT
Start: 2019-09-12 | End: 2019-09-13 | Stop reason: HOSPADM

## 2019-09-12 RX ADMIN — HEPARIN SODIUM 5000 UNITS: 5000 INJECTION, SOLUTION INTRAVENOUS; SUBCUTANEOUS at 05:08

## 2019-09-12 RX ADMIN — LEVOTHYROXINE SODIUM 125 MCG: 125 TABLET ORAL at 05:08

## 2019-09-12 RX ADMIN — SERTRALINE HYDROCHLORIDE 200 MG: 100 TABLET ORAL at 08:03

## 2019-09-12 RX ADMIN — HYDRALAZINE HYDROCHLORIDE 50 MG: 50 TABLET ORAL at 21:24

## 2019-09-12 RX ADMIN — ASPIRIN 81 MG 81 MG: 81 TABLET ORAL at 08:03

## 2019-09-12 RX ADMIN — SEVELAMER HYDROCHLORIDE 800 MG: 800 TABLET, FILM COATED PARENTERAL at 11:30

## 2019-09-12 RX ADMIN — SODIUM BICARBONATE 650 MG TABLET 1300 MG: at 21:24

## 2019-09-12 RX ADMIN — SEVELAMER HYDROCHLORIDE 800 MG: 800 TABLET, FILM COATED PARENTERAL at 17:14

## 2019-09-12 RX ADMIN — TACROLIMUS 3 MG: 1 CAPSULE ORAL at 08:04

## 2019-09-12 RX ADMIN — SENNOSIDES AND DOCUSATE SODIUM 1 TABLET: 8.6; 5 TABLET ORAL at 10:23

## 2019-09-12 RX ADMIN — SODIUM BICARBONATE 650 MG TABLET 1300 MG: at 17:14

## 2019-09-12 RX ADMIN — FOLIC ACID 1000 MCG: 1 TABLET ORAL at 08:03

## 2019-09-12 RX ADMIN — HYDRALAZINE HYDROCHLORIDE 50 MG: 50 TABLET ORAL at 05:09

## 2019-09-12 RX ADMIN — ROPINIROLE HYDROCHLORIDE 0.25 MG: 0.25 TABLET, FILM COATED ORAL at 08:04

## 2019-09-12 RX ADMIN — CLONIDINE HYDROCHLORIDE 0.3 MG: 0.1 TABLET ORAL at 21:23

## 2019-09-12 RX ADMIN — CEFEPIME HYDROCHLORIDE 500 MG: 1 INJECTION, POWDER, FOR SOLUTION INTRAMUSCULAR; INTRAVENOUS at 17:15

## 2019-09-12 RX ADMIN — CLONIDINE HYDROCHLORIDE 0.3 MG: 0.1 TABLET ORAL at 13:17

## 2019-09-12 RX ADMIN — ARIPIPRAZOLE 30 MG: 15 TABLET ORAL at 21:24

## 2019-09-12 RX ADMIN — SEVELAMER HYDROCHLORIDE 800 MG: 800 TABLET, FILM COATED PARENTERAL at 08:03

## 2019-09-12 RX ADMIN — POLYETHYLENE GLYCOL 3350 17 G: 17 POWDER, FOR SOLUTION ORAL at 10:23

## 2019-09-12 RX ADMIN — AMLODIPINE BESYLATE 10 MG: 10 TABLET ORAL at 08:03

## 2019-09-12 RX ADMIN — HYDRALAZINE HYDROCHLORIDE 50 MG: 50 TABLET ORAL at 13:17

## 2019-09-12 RX ADMIN — SODIUM BICARBONATE 650 MG TABLET 1300 MG: at 08:03

## 2019-09-12 RX ADMIN — HEPARIN SODIUM 5000 UNITS: 5000 INJECTION, SOLUTION INTRAVENOUS; SUBCUTANEOUS at 21:24

## 2019-09-12 RX ADMIN — CLONIDINE HYDROCHLORIDE 0.3 MG: 0.1 TABLET ORAL at 05:08

## 2019-09-12 RX ADMIN — SODIUM CHLORIDE, SODIUM GLUCONATE, SODIUM ACETATE, POTASSIUM CHLORIDE AND MAGNESIUM CHLORIDE 100 ML/HR: 526; 502; 368; 37; 30 INJECTION, SOLUTION INTRAVENOUS at 08:01

## 2019-09-12 RX ADMIN — METOPROLOL TARTRATE 50 MG: 50 TABLET, FILM COATED ORAL at 08:06

## 2019-09-12 RX ADMIN — ACETAMINOPHEN 650 MG: 325 TABLET ORAL at 04:42

## 2019-09-12 RX ADMIN — TACROLIMUS 2 MG: 1 CAPSULE ORAL at 21:24

## 2019-09-12 RX ADMIN — INSULIN GLARGINE 5 UNITS: 100 INJECTION, SOLUTION SUBCUTANEOUS at 21:29

## 2019-09-12 RX ADMIN — DOXAZOSIN 4 MG: 4 TABLET ORAL at 21:24

## 2019-09-12 RX ADMIN — POTASSIUM CHLORIDE 40 MEQ: 1500 TABLET, EXTENDED RELEASE ORAL at 09:36

## 2019-09-12 RX ADMIN — METOPROLOL TARTRATE 50 MG: 50 TABLET, FILM COATED ORAL at 21:24

## 2019-09-12 RX ADMIN — ROPINIROLE HYDROCHLORIDE 0.25 MG: 0.25 TABLET, FILM COATED ORAL at 17:14

## 2019-09-12 RX ADMIN — SENNOSIDES AND DOCUSATE SODIUM 1 TABLET: 8.6; 5 TABLET ORAL at 17:14

## 2019-09-12 RX ADMIN — HEPARIN SODIUM 5000 UNITS: 5000 INJECTION, SOLUTION INTRAVENOUS; SUBCUTANEOUS at 13:18

## 2019-09-12 RX ADMIN — DULOXETINE HYDROCHLORIDE 60 MG: 60 CAPSULE, DELAYED RELEASE ORAL at 08:03

## 2019-09-12 RX ADMIN — FERROUS SULFATE TAB 325 MG (65 MG ELEMENTAL FE) 325 MG: 325 (65 FE) TAB at 09:35

## 2019-09-12 RX ADMIN — DULOXETINE HYDROCHLORIDE 60 MG: 60 CAPSULE, DELAYED RELEASE ORAL at 17:14

## 2019-09-12 RX ADMIN — INSULIN LISPRO 1 UNITS: 100 INJECTION, SOLUTION INTRAVENOUS; SUBCUTANEOUS at 11:31

## 2019-09-12 RX ADMIN — PREDNISONE 5 MG: 5 TABLET ORAL at 08:03

## 2019-09-12 NOTE — ASSESSMENT & PLAN NOTE
Lab Results   Component Value Date    HGBA1C 5 1 09/09/2019       Recent Labs     09/11/19  1058 09/11/19  1545 09/11/19 2059 09/12/19  0730   POCGLU 148* 134 197* 117       Blood Sugar Average: Last 72 hrs:  (P) 144 2 takes 1 unit of toujeo a daily at home  Diabetic diet/renal diet  Sliding scale coverage  Lantus q h s

## 2019-09-12 NOTE — ASSESSMENT & PLAN NOTE
with metabolic acidosis  Evaluated by Nephrology, etiology multifactorial with possibility of AIN from use of Revlimid  Improving on IV fluid/bicarb drip  Nephrology is following  Continue to hold Lasix

## 2019-09-12 NOTE — SOCIAL WORK
Cm reviewed patient during care coordination rounds with Dr Elgin Lewis  Patient not stable for discharge today but anticipated for possible discharge tomorrow  Patient to discharge home  PT/OT to re-evaluate patient in the morning tomorrow  Cm awaiting updated recommendations as patient originally declined [address] due to moving and having a small apartment set-up  Patient's parents to transport and help patient upon discharge  Cm following

## 2019-09-12 NOTE — PROGRESS NOTES
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, eosinophils 14%  · Ultrasound of transplant kidney with Doppler:  No hydronephrosis or renal vein thrombosis noted   Elevated arterial resistive indices    · Revlimid has a side effect of AIN  · Bladder scan Postvoid residual volume 55ml     CKD stage 4   · Status post renal and pancreatic transplant 1998  · Immunosuppression medications include tacrolimus 3 mg in the a m , 2 mg in p m , prednisone 5 mg  Goal tacrolimus level 4, most recent 5 1, yesterday pending  · 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 versus arteriolar/hypertensive nephrosclerosis  · Baseline creatinine 2, nephrologist noted increasing creatinine 4 4 in outpatient labs, recommended patient to come to ED  · Creatinine today 3 01, responding to fluids well  Will discuss with oncologist regarding her Revlimid treatment      UTI, recurrent  · History of recurring UTI/pyelonephritis, E  Coli  · UA was 2+ protein positive nitrates, positive white blood cells, positive leukocytes, moderate bacteria, urine culture pending  · Continue cefepime IV, can change to PO cefazolin, urine culture from last month showed pansusceptibility  · Pt continues to be asymptomatic     Non anion gap Metabolic acidosis  · ABG on admission:  PH 7 1   Most recent ABG showed pH 7 25  · Bicarb today 22  · Likely due to chronic renal failure  · Improving, patient responding to fluids, continue to monitor  · Alert and oriented x3  · Urine anion gap +, could be possible RTA, continue current management       Electrolytes  · Na 140, K+ 3 4, will replete     Hypertension/volume status  · BP continues to increase over last 24 hours, most recent 174/58  · Reviewed hold parameters for blood pressure medications, continue to monitor  · Increased Cardura to 4mg qd  · Patient appears to be slightly volume overloaded with peripheral hand edema b/l, denies shortness of breath      Anemia  · Hemoglobin 7 2, baseline seems to be around 9-10  · Multifactorial, CKD stage 4 versus multiple myeloma versus chemotherapy  · Continue to monitor, transfuse as needed  · Fe deficiency, will hold off on IV Fe as she is currently receiving antibiotics  · Added Ferrous sulfate 325 qd     Bone mineral disease  · Magnesium 2 4, phosphorus 7 7-->4 6 today, likely increased due to EDUARDO on CKD  · Continue sevlamer     Other disorders:  Multiple myeloma  ·  Patient diagnosed with multiple myeloma in 04/2019 from smoldering myeloma diagnosed in 09/2017   Follows with Dr Lucero Fitzpatrick currently on Revlimid 5 mg every other day 3 weeks on 1 week off      Hypothyroidism  Aortic regurgitation  Diastolic congestive heart failure        SUBJECTIVE   Yao Howell 53 y o  female who had no acute events overnight  States she is feeling better overall  Has not had recurrence of her urinary symptoms of dysuria, increased urgency and frequency  Denies fevers or chills overnight  Denies nausea, vomiting, diarrhea, constipation  Has not noticed blood in her urine or stool  Denies melena or foul-smelling stool  States she is still urinating more than usual due to the fluids  REVIEW OF SYSTEMS   Review of Systems   Constitutional: Negative for activity change, appetite change and fever  HENT: Negative for trouble swallowing  Eyes: Negative for visual disturbance  Respiratory: Negative for cough, chest tightness and shortness of breath  Cardiovascular: Negative for leg swelling     Gastrointestinal: Negative for abdominal pain, constipation, diarrhea and nausea  Endocrine: Positive for polyuria  Genitourinary: Negative for dysuria, frequency and hematuria  Musculoskeletal: Negative for back pain  Neurological: Positive for weakness  Negative for dizziness  Psychiatric/Behavioral: Negative for confusion  OBJECTIVE     Vitals:    19 2121 19 2312 19 0508 19 0710   BP: (!) 189/58 141/59 (!) 184/65 (!) 174/58   Pulse: 59 58  59   Resp:  17  16   Temp:  98 3 °F (36 8 °C)  98 9 °F (37 2 °C)   TempSrc:       SpO2: 93% 98%  97%   Weight:       Height:          Temperature:   Temp (24hrs), Av 6 °F (37 °C), Min:98 3 °F (36 8 °C), Max:98 9 °F (37 2 °C)    Temperature: 98 9 °F (37 2 °C)  Intake & Output:  I/O       09/10 07 -  07 07 -  07 07 -  0700    P  O  776 520     I V  (mL/kg) 970 (8 6) 1841 7 (16 3) 1060 (9 4)    IV Piggyback 50      Total Intake(mL/kg) 1796 (15 9) 2361 7 (20 9) 1060 (9 4)    Urine (mL/kg/hr) 1350 (0 5) 1950 (0 7)     Stool 0 0     Total Output 1350 1950     Net +446 +411 7 +1060               Weights:   IBW: 63 9 kg    Body mass index is 37 88 kg/m²  Weight (last 2 days)     Date/Time   Weight    09/10/19 0300   113 (249 12)            Physical Exam   Constitutional: No distress  HENT:   Head: Normocephalic and atraumatic  Mouth/Throat: No oropharyngeal exudate  Eyes: No scleral icterus  Neck: No JVD present  Cardiovascular: Normal rate and regular rhythm  Exam reveals no gallop and no friction rub  No murmur heard  Pulmonary/Chest: Effort normal  No respiratory distress  She has no wheezes  She exhibits no tenderness  Abdominal: She exhibits distension  There is no tenderness  Musculoskeletal: She exhibits no tenderness or deformity  1+ pitting edema of b/l hands  1+ b/l ankle edema   Lymphadenopathy:     She has no cervical adenopathy  Skin: Skin is dry  There is pallor     Psychiatric: Her behavior is normal      PAST MEDICAL HISTORY     Past Medical History:   Diagnosis Date    Abnormal liver function test     [facility] kidney [facility]     Acute on chronic congestive heart failure (HCC)     Allergic urticaria     Anemia     Cancer (HCC)     Multiple myeloma    Cervical dysplasia     Cholelithiasis     Chronic diastolic (congestive) heart failure ([facility] ) 9/18/2017    [facility]     Previous, controlled with diet    Diabetes mellitus with foot ulcer ([facility] )     Disease of thyroid gland     Encephalopathy     Hematuria     + leak est- secondary to UTIs/panc drainage    History of transfusion     Hyperkalemia     Hypertension     Iliotibial band syndrome     Lumbar radiculopathy     Multiple myeloma (HCC)     Multiple myeloma ([facility]     Night blindness     Nonrheumatic aortic (valve) insufficiency     Pneumonia     Renal disorder     Retinopathy     Seborrhea     Seizure ([facility]     Shingles     Sinus tachycardia     B blocker - cardio echo stress test 02 normal/neg LE doppler 2/02 OK and 12/07    Status post simultaneous kidney and pancreas transplant ([facility] )     Toe amputation status (HCC)     Trochanteric bursitis      PAST SURGICAL HISTORY     Past Surgical History:   Procedure Laterality Date    CATARACT EXTRACTION      CHOLECYSTECTOMY      COLONOSCOPY      two polyps in the rectum removed and biopsied diverticulosis in the sigmoid colon, external hemorrhoiods- Dr Barfield    COMBINED KIDNEY-PANCREAS TRANSPLANT N/A     CT BONE MARROW BIOPSY AND ASPIRATION  4/17/2019    CYSTOSCOPY N/A 10/13/2016    Procedure: CYSTOSCOPY, retrograde pyelogram, biopsy of ureteral polyp; Surgeon: Vinny Seals MD;  Location: [facility];  Service:    Lohn Ravel DILATION AND CURETTAGE OF UTERUS      ESOPHAGOGASTRODUODENOSCOPY N/A 11/20/2017    Procedure: ESOPHAGOGASTRODUODENOSCOPY (EGD); Surgeon: Stone Coburn DO;  Location: [facility];   Service: Gastroenterology    EYE SURGERY      cataracts    FOOT AMPUTATION THROUGH METATARSAL Left     FOOT SURGERY Right     excision of metatarsal heads    HALLUX VALGUS CORRECTION Right     NEPHRECTOMY TRANSPLANTED ORGAN      PANCREATIC TRANSPLANT REMOVAL       SOCIAL & FAMILY HISTORY     Social History     Substance and Sexual Activity   Alcohol Use Never    Frequency: Never    Binge frequency: Never    Comment: (history)     Social History     Substance and Sexual Activity   Drug Use Never    Types: Hydrocodone    Comment: Past cocaine use many years ago - no current use     Social History     Tobacco Use   Smoking Status Former Smoker    Last attempt to quit: 2012    Years since quittin 3   Smokeless Tobacco Never Used     Family History   Problem Relation Age of Onset    Hypertension Mother     Cancer Mother     Hypertension Father     Cancer Father     Cancer Maternal Grandfather     Cancer Paternal Grandmother     Cancer Paternal Grandfather     Depression Sister     Breast cancer Maternal Grandmother 77     LABORATORY DATA     Labs: I have personally reviewed pertinent reports  Results from last 7 days   Lab Units 19  0503 19  0537 09/10/19  0504  19  0859   WBC Thousand/uL 6 45 7 42 6 98  --  6 50   HEMOGLOBIN g/dL 7 2* 7 5* 7 6*  --  8 2*   HEMATOCRIT % 24 2* 24 5* 25 5*  --  28 5*   PLATELETS Thousands/uL 185 202 207   < > 209   NEUTROS PCT % 55  --  60  --   --    MONOS PCT % 12  --  9  --   --    MONO PCT %  --   --   --   --  4    < > = values in this interval not displayed      Results from last 7 days   Lab Units 19  0503 19  0537 09/10/19  0504 19  0859   POTASSIUM mmol/L 3 4* 3 4* 3 9 4 4   CHLORIDE mmol/L 105 107 111* 112*   CO2 mmol/L 22 20* 15* 12*   BUN mg/dL 64* 63* 78* 85*   CREATININE mg/dL 3 01* 3 47* 4 15* 4 42*   CALCIUM mg/dL 7 9* 7 7* 6 9* 7 6*   ALK PHOS U/L  --   --   --  127*   ALT U/L  --   --   --  26   AST U/L  --   --   --  23     Results from last 7 days   Lab Units 19  0859 MAGNESIUM mg/dL 2 4     Results from last 7 days   Lab Units 09/12/19  0503 09/09/19  0859   PHOSPHORUS mg/dL 4 6* 7 7*          Results from last 7 days   Lab Units 09/09/19  2149   LACTIC ACID mmol/L 0 4*         Micro:  Lab Results   Component Value Date    BLOODCX No Growth After 5 Days  11/16/2017    BLOODCX No Growth After 5 Days  09/13/2017    BLOODCX No Growth After 5 Days  09/13/2017    URINECX >100,000 cfu/ml Escherichia coli (A) 08/14/2019    URINECX >100,000 cfu/ml Escherichia coli (A) 01/24/2019    URINECX >100,000 cfu/ml Escherichia coli (A) 11/26/2018     IMAGING & DIAGNOSTIC TESTS     Imaging: I have personally reviewed pertinent reports  Us Transplant Kidney With Doppler    Result Date: 9/9/2019  Impression: 1  Elevated arterial resistive indices in the transplant kidney up to 0 8 concerning for possible development of transplant rejection or ATN  Nephrology consult recommended  No hydronephrosis or renal vein thrombosis identified  2  Atrophic native kidneys  Workstation performed: [id number]     EKG, Pathology, and Other Studies: I have personally reviewed pertinent reports  ALLERGIES     Allergies   Allergen Reactions    Ixazomib Rash     Ninlaro    Revlimid [Lenalidomide] Rash    Cefadroxil     Latex Rash    Morphine Other (See Comments)     Other reaction(s): Other (See Comments)  projectile vomiting    Morphine And Related GI Intolerance    Myrbetriq [Mirabegron] Hives    Penicillins Hives     Other reaction(s): Other (See Comments)  Respiratory Distress,hives  Tolerates cefazolin     MEDICATIONS PRIOR TO ARRIVAL     Prior to Admission medications    Medication Sig Start Date End Date Taking?  Authorizing Provider   amLODIPine (NORVASC) 10 mg tablet TAKE 1 TABLET(10MG) BY MOUTH EVERY MORNING AND 1/2 TABLET(5MG) EVERY EVENING 4/8/19  Yes Sarah Beth Champagne MD   ARIPiprazole (ABILIFY) 30 mg tablet Take 1 tablet (30 mg total) by mouth daily at bedtime for 180 days 6/5/19 12/2/19 Yes Mariola Sutton MD   aspirin 81 MG tablet Take 1 tablet by mouth daily 6/5/13  Yes Historical Provider, MD   busPIRone (BUSPAR) 5 mg tablet Take 1 tablet (5 mg total) by mouth 2 (two) times a day for 180 days 6/5/19 12/2/19 Yes Mariola Sutton MD   Cholecalciferol (VITAMIN D3) 1000 units CAPS Take 3 capsules by mouth daily     Yes Historical Provider, MD   cloNIDine (CATAPRES) 0 1 mg tablet TAKE 3 TABLETS BY MOUTH EVERY MORNING, 3 TABLETS AT NOON, AND 3 TABLETS EVERY EVENING 5/26/19  Yes MER Lu   doxazosin (CARDURA) 1 mg tablet TAKE 2 TABLETS BY MOUTH EVERY NIGHT AT BEDTIME 8/19/19  Yes Wyatt Medina MD   DULoxetine (CYMBALTA) 60 mg delayed release capsule Take 1 capsule (60 mg total) by mouth 2 (two) times a day for 180 days 6/5/19 12/2/19 Yes Mariola Sutton MD   folic acid (FOLVITE) 1 mg tablet TAKE 1 TABLET BY MOUTH DAILY AS DIRECTED 2/22/19  Yes Ute Gardner MD   furosemide (LASIX) 20 mg tablet TAKE 1 TABLET BY MOUTH EVERY DAY 7/20/19  Yes Wyatt Medina MD   hydrALAZINE (APRESOLINE) 50 mg tablet TAKE 1 TABLET(50MG) BY MOUTH THREE TIMES DAILY FOR 90 DAYS 7/20/19  Yes Wyatt Medina MD   Insulin Glargine (TOUJEO SOLOSTAR) 300 units/mL CONCETRATED U-300 injection pen Inject 1 Units under the skin daily at bedtime 1/10/19  Yes Arthur Goins MD   lenalidomide (REVLIMID) 5 MG CAPS Take 5 mg every other day for 21 days on and 7 days off [id number] 8/22/19 8/22/19  Yes Ame Jiménez PA-C   levothyroxine 125 mcg tablet Take 1 tablet (125 mcg total) by mouth daily 6/24/19  Yes Nya Lutz PA-C   metoprolol tartrate (LOPRESSOR) 50 mg tablet TAKE 1 TABLET(50MG TOTAL) BY MOUTH TWICE DAILY 7/9/18  Yes Wyatt Medina MD   pravastatin (PRAVACHOL) 80 mg tablet TAKE 1 TABLET BY MOUTH DAILY 1/29/18  Yes Ute Gardner MD   predniSONE 5 mg tablet Take 1 tablet (5 mg total) by mouth daily 5/24/19  Yes MER Lu   rOPINIRole (REQUIP) 0 25 mg tablet TAKE 1 TABLET BY MOUTH TWICE DAILY 8/18/19  Yes Nuris Irwin MD   sertraline (ZOLOFT) 100 mg tablet Take 2 tablets (200 mg total) by mouth daily for 180 days 6/5/19 12/2/19 Yes Jt Arango MD   sevelamer carbonate (RENVELA) 800 mg tablet Take 1 tablet (800 mg total) by mouth 3 (three) times a day with meals 7/18/19  Yes Cisco Reed MD   sodium bicarbonate 650 mg tablet TAKE 2 TABLETS BY MOUTH THREE TIMES DAILY 5/28/19  Yes Cisco Reed MD   tacrolimus (PROGRAF) 1 mg capsule Take 3 mg in AM and 2 mg in PM (6/18/19) 6/18/19  Yes Cisco Reed MD   insulin lispro (HUMALOG KWIKPEN) 100 units/mL injection pen INJECT 10 UNDER THE SKIN EVERY DAY OR AS DIRECTED  Patient taking differently: INJECT 10 UNDER THE SKIN EVERY DAY OR AS DIRECTED 5/21/19   Get Petty MD   Insulin Pen Needle (BD PEN NEEDLE VISHNU U/F) 32G X 4 MM MISC Use 4 times daily 8/23/18   Get Petty MD   Lancets (ONETOUCH ULTRASOFT) lancets by Does not apply route 4 (four) times a day   3/9/16   Historical Provider, MD   LORazepam (ATIVAN) 2 mg tablet Take 1 tablet (2 mg total) by mouth 3 (three) times a day as needed for anxiety for up to 120 days To be filled on or after 6/29/19 6/29/19 10/27/19  Jt Arango MD   ONE TOUCH ULTRA TEST test strip Use 4 times daily ( please dispense One touch Ultra test strips) 7/9/19   Get Petty MD     MEDICATIONS ADMINISTERED IN LAST 24 HOURS     Medication Administration - last 24 hours from 09/11/2019 0906 to 09/12/2019 0906       Date/Time Order Dose Route Action Action by     09/11/2019 2120 ARIPiprazole (ABILIFY) tablet 30 mg 30 mg Oral Given Chuck Krueger RN     09/12/2019 0803 aspirin chewable tablet 81 mg 81 mg Oral Given Lee Palm RN     09/12/2019 0508 cloNIDine (CATAPRES) tablet 0 3 mg 0 3 mg Oral Given Marlyn Armas RN     09/11/2019 2121 cloNIDine (CATAPRES) tablet 0 3 mg 0 3 mg Oral Given Chuck Krueger RN 09/11/2019 1504 cloNIDine (CATAPRES) tablet 0 3 mg 0 3 mg Oral Given Marguerite Wylieard, RN     09/12/2019 0803 amLODIPine (NORVASC) tablet 10 mg 10 mg Oral Given Clista Minus, RN     09/11/2019 2121 doxazosin (CARDURA) tablet 2 mg 2 mg Oral Given Olivia Boys, RN     09/12/2019 0803 DULoxetine (CYMBALTA) delayed release capsule 60 mg 60 mg Oral Given Clista Minus, RN     09/11/2019 1808 DULoxetine (CYMBALTA) delayed release capsule 60 mg 60 mg Oral Given Olivia Boys, RN     14/47/6361 0268 folic acid (FOLVITE) tablet 1,000 mcg 1,000 mcg Oral Given Clista Minus, RN     09/12/2019 0509 hydrALAZINE (APRESOLINE) tablet 50 mg 50 mg Oral Given [employer] [facility], RN     09/11/2019 2121 hydrALAZINE (APRESOLINE) tablet 50 mg 50 mg Oral Given Olivia Boys, RN     09/11/2019 1504 hydrALAZINE (APRESOLINE) tablet 50 mg 50 mg Oral Given Marguerite Kingsley, RN     09/12/2019 5785 levothyroxine tablet 125 mcg 125 mcg Oral Given [employer] [facility], RN     09/12/2019 0806 metoprolol tartrate (LOPRESSOR) tablet 50 mg 50 mg Oral Given Clista Minus, RN     09/11/2019 2121 metoprolol tartrate (LOPRESSOR) tablet 50 mg 50 mg Oral Given Olivia Boys, RN     09/12/2019 0803 predniSONE tablet 5 mg 5 mg Oral Given Clista Minus, RN     09/12/2019 0804 rOPINIRole (REQUIP) tablet 0 25 mg 0 25 mg Oral Given Clista Minus, RN     09/11/2019 1809 rOPINIRole (REQUIP) tablet 0 25 mg 0 25 mg Oral Given Olivia Boys, RN     09/12/2019 0803 sertraline (ZOLOFT) tablet 200 mg 200 mg Oral Given Clista Minus, RN     09/12/2019 0803 sevelamer (RENAGEL) tablet 800 mg 800 mg Oral Given Clista Minus, RN     09/11/2019 1808 sevelamer (RENAGEL) tablet 800 mg 800 mg Oral Given Olivia Boys, RN     09/11/2019 1152 sevelamer (RENAGEL) tablet 800 mg 800 mg Oral Given Marguerite Kingsley, RN     09/12/2019 0803 sodium bicarbonate tablet 1,300 mg 1,300 mg Oral Given Keisha Rhodes RN     09/11/2019 2121 sodium bicarbonate tablet 1,300 mg 1,300 mg Oral Given Elia Barriga, RN     09/11/2019 1808 sodium bicarbonate tablet 1,300 mg 1,300 mg Oral Given Elia Barriga, RN     09/12/2019 0804 tacrolimus (PROGRAF) capsule 3 mg 3 mg Oral Given Mickey Hayes, RN     09/11/2019 1028 tacrolimus (PROGRAF) capsule 3 mg 3 mg Oral Given Cliff Callejas, RN     09/11/2019 2121 tacrolimus (PROGRAF) capsule 2 mg 2 mg Oral Given Elia Barriga, RN     09/11/2019 1029 sodium bicarbonate 150 mEq in dextrose 5 % 1,000 mL infusion 0 mL/hr Intravenous Stopped Cliff Callejas, RN     09/12/2019 0508 heparin (porcine) subcutaneous injection 5,000 Units 5,000 Units Subcutaneous Given Cherie Leija, RN     09/11/2019 2122 heparin (porcine) subcutaneous injection 5,000 Units 5,000 Units Subcutaneous Given Elia Barriga, RN     09/11/2019 1505 heparin (porcine) subcutaneous injection 5,000 Units 5,000 Units Subcutaneous Given Cliff Callejas, RN     09/12/2019 0741 insulin lispro (HumaLOG) 100 units/mL subcutaneous injection 1-6 Units 1 Units Subcutaneous Not Given Mickey Hayes, RN     09/11/2019 1808 insulin lispro (HumaLOG) 100 units/mL subcutaneous injection 1-6 Units 1 Units Subcutaneous Not Given Elia Barriga, RN     09/11/2019 1120 insulin lispro (HumaLOG) 100 units/mL subcutaneous injection 1-6 Units 1 Units Subcutaneous Not Given Cliff Callejas, RN     09/11/2019 2122 insulin lispro (HumaLOG) 100 units/mL subcutaneous injection 1-6 Units 2 Units Subcutaneous Given Elia Barriga, RN     09/11/2019 1842 cefepime (MAXIPIME) 500 mg in sodium chloride 0 9 % 50 mL IVPB 0 mg Intravenous Stopped Elia Barriga, RN     09/11/2019 1812 cefepime (MAXIPIME) 500 mg in sodium chloride 0 9 % 50 mL IVPB 500 mg Intravenous New Bag Elia Barriga, RN     09/12/2019 0801 multi-electrolyte (ISOLYTE-S PH 7 4 equivalent) IV solution 100 mL/hr Intravenous New Bag Mickey Hayes, RN     09/12/2019 0800 multi-electrolyte (ISOLYTE-S PH 7 4 equivalent) IV solution 0 mL/hr Intravenous Stopped Sonia Marrufo, MICHELLE     09/11/2019 2124 multi-electrolyte (ISOLYTE-S PH 7 4 equivalent) IV solution 100 mL/hr Intravenous New Bag Oran Holter, MICHELLE     09/11/2019 2123 multi-electrolyte (ISOLYTE-S PH 7 4 equivalent) IV solution 0 mL/hr Intravenous Stopped Oran Holter, RN     09/11/2019 1029 multi-electrolyte (ISOLYTE-S PH 7 4 equivalent) IV solution 100 mL/hr Intravenous New Bag Carey Curry, MICHELLE     09/11/2019 2123 insulin glargine (LANTUS) subcutaneous injection 5 Units 0 05 mL 5 Units Subcutaneous Given Oran Holter, RN     09/12/2019 7582 acetaminophen (TYLENOL) tablet 650 mg 650 mg Oral Given Bria Nguyen RN        CURRENT MEDICATIONS     Current Facility-Administered Medications:  acetaminophen 650 mg Oral Q6H PRN Kourtney Ortiz PA-C    amLODIPine 10 mg Oral Daily Kingsley Aase, MD    ARIPiprazole 30 mg Oral HS Kingsley Aase, MD    aspirin 81 mg Oral Daily Kingsley Aase, MD    cefepime 500 mg Intravenous Q24H Kingsley Aase, MD Last Rate: Stopped (09/11/19 1842)   cloNIDine 0 3 mg Oral Q8H Elsi Michel MD    doxazosin 2 mg Oral HS Kingsley Aase, MD    DULoxetine 60 mg Oral BID Kingsley Aase, MD    folic acid 6,850 mcg Oral Daily Kingsley Aase, MD    heparin (porcine) 5,000 Units Subcutaneous [facility] Kingsley Aase, MD    hydrALAZINE 50 mg Oral [facility] Kingsley Aase, MD    insulin glargine 5 Units Subcutaneous HS Maci Lewis DO    insulin lispro 1-6 Units Subcutaneous TID AC Kingsley Aase, MD    insulin lispro 1-6 Units Subcutaneous HS Kingsley Aase, MD    levothyroxine 125 mcg Oral Daily Kingsley Aase, MD    LORazepam 1 mg Oral TID PRN Kingsley Aase, MD    metoprolol tartrate 50 mg Oral Q12H [address] Kingsley Aase, MD    multi-electrolyte 100 mL/hr Intravenous Continuous Antonio Salazar MD Last Rate: 100 mL/hr (09/12/19 0801)   predniSONE 5 mg Oral Daily Kingsley Aase, MD    rOPINIRole 0 25 mg Oral BID Kingsley Aase, MD    sertraline 200 mg Oral Daily Pete Warner MD    sevelamer 800 mg Oral TID With Meals Pete Warner MD    sodium bicarbonate 1,300 mg Oral TID Pete Warner MD    tacrolimus 2 mg Oral HS Pete Warner MD    tacrolimus 3 mg Oral QAM Pete Warner MD        multi-electrolyte 100 mL/hr Last Rate: 100 mL/hr (09/12/19 0801)       acetaminophen 650 mg Q6H PRN   LORazepam 1 mg TID PRN       Portions of the record may have been created with voice recognition software  Occasional wrong word or "sound a like" substitutions may have occurred due to the inherent limitations of voice recognition software    Read the chart carefully and recognize, using context, where substitutions have occurred     ==  Yunior Garcia, [facility]  Internal Medicine Residency

## 2019-09-12 NOTE — ASSESSMENT & PLAN NOTE
With elevated BP  Continue amlodipine, metoprolol, clonidine, doxazosin and hydralazine  Nephrology is following

## 2019-09-12 NOTE — PROGRESS NOTES
Progress Note - Jenyn Quiles 1965, 53 y o  female MRN: [id number]    [facility] Encounter: [id number]    Primary Care Provider: Devika Starr MD   Date and time admitted to hospital: 9/9/2019  4:37 PM        Acute renal failure superimposed on stage 4 chronic kidney disease ([facility] [address] )  Assessment & Plan  with metabolic acidosis  Evaluated by Nephrology, etiology multifactorial with possibility of AIN from use of Revlimid  Improving on IV fluid/bicarb drip  Nephrology is following  Continue to hold Lasix      * UTI (urinary tract infection)  Assessment & Plan  Suspect recurrent UTI  Prior culture showed pansensitive E coli  No urine culture were sent this time  Continue with IV cefepime day # 4/5      Acute metabolic encephalopathy  Assessment & Plan  POA, Likely secondary to above  Improving, continue to monitor  PT OT eval      Multiple myeloma not having achieved remission [facility])  Assessment & Plan  Patient was recommended to retry Revlimid 5 mg every other day 3 weeks on and 1 week off  Patient takes it every other day  Continue to hold due to EDUARDO  Outpatient Hematology follow-up    Anemia  Assessment & Plan  Chronic anemia  Hemoglobin drop suspect secondary to EDUARDO/ current infection  No active bleeding  Transfuse 1 unit of packed red blood cells today  Monitor      Essential hypertension  Assessment & Plan  With elevated BP  Continue amlodipine, metoprolol, clonidine, doxazosin and hydralazine  Nephrology is following      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 [facility])  Assessment & Plan  Lab Results   Component Value Date    HGBA1C 5 1 09/09/2019       Recent Labs     09/11/19  1058 09/11/19  1545 09/11/19  2059 09/12/19  0730   POCGLU 148* 134 197* 117       Blood Sugar Average: Last 72 hrs:  (P) 144 2 takes 1 unit of toujeo a daily at home  Diabetic diet/renal diet  Sliding scale coverage  Lantus q h s      Renal transplant recipient  Assessment & Plan  Patient is status post kidney and pancreatic transplant in   Continue Prograf and steroids  Nephrology is following             VTE Pharmacologic Prophylaxis:   Pharmacologic: Heparin  Mechanical VTE Prophylaxis in Place: Yes    Patient Centered Rounds: I have performed bedside rounds with nursing staff today  Discussions with Specialists or Other Care Team Provider:  Nephrology    Education and Discussions with Family / Patient:  Patient    Time Spent for Care: 30 minutes  More than 50% of total time spent on counseling and coordination of care as described above  Current Length of Stay: 3 day(s)    Current Patient Status: Inpatient   Certification Statement: The patient will continue to require additional inpatient hospital stay due to above    Discharge Plan / Estimated Discharge Date:  Possible home tomorrow    Code Status: Level 1 - Full Code      Subjective:   Patient seen and examined  Comfortable sitting in chair  Clinically improving  Requesting stool softeners    Objective:     Vitals:   Temp (24hrs), Av 6 °F (37 °C), Min:98 3 °F (36 8 °C), Max:98 9 °F (37 2 °C)    Temp:  [98 3 °F (36 8 °C)-98 9 °F (37 2 °C)] 98 9 °F (37 2 °C)  HR:  [58-59] 59  Resp:  [16-17] 16  BP: (141-189)/(58-65) 174/58  SpO2:  [93 %-98 %] 97 %  Body mass index is 37 88 kg/m²  Input and Output Summary (last 24 hours):        Intake/Output Summary (Last 24 hours) at 2019 0949  Last data filed at 2019 0935  Gross per 24 hour   Intake 3458 34 ml   Output 1950 ml   Net 1508 34 ml       Physical Exam:     Physical Exam  Patient is awake alert oriented in no acute distress  Lung clear to auscultation bilateral  Heart positive S1-S2 no murmur  Abdomen soft nontender  Lower extremities no edema    Additional Data:     Labs:    Results from last 7 days   Lab Units 19  0503   WBC Thousand/uL 6 45   HEMOGLOBIN g/dL 7 2*   HEMATOCRIT % 24 2*   PLATELETS Thousands/uL 185   NEUTROS PCT % 55   LYMPHS PCT % 15 MONOS PCT % 12   EOS PCT % 13*     Results from last 7 days   Lab Units 09/12/19  0503  09/09/19  0859   POTASSIUM mmol/L 3 4*   < > 4 4   CHLORIDE mmol/L 105   < > 112*   CO2 mmol/L 22   < > 12*   BUN mg/dL 64*   < > 85*   CREATININE mg/dL 3 01*   < > 4 42*   CALCIUM mg/dL 7 9*   < > 7 6*   ALK PHOS U/L  --   --  127*   ALT U/L  --   --  26   AST U/L  --   --  23    < > = values in this interval not displayed  * I Have Reviewed All Lab Data Listed Above  * Additional Pertinent Lab Tests Reviewed:  [address] Admission Reviewed    Imaging:    Imaging Reports Reviewed Today Include:   Imaging Personally Reviewed by Myself Includes:     Recent Cultures (last 7 days):           Last 24 Hours Medication List:     Current Facility-Administered Medications:  acetaminophen 650 mg Oral Q6H PRN Kourtney Ortiz PA-C    amLODIPine 10 mg Oral Daily Nora Murillo MD    ARIPiprazole 30 mg Oral HS Nora Murillo MD    aspirin 81 mg Oral Daily Nora Murillo MD    cefepime 500 mg Intravenous Q24H Nora Murillo MD Last Rate: Stopped (09/11/19 1842)   cloNIDine 0 3 mg Oral Q8H Isabella Gutierrez MD    doxazosin 4 mg Oral HS Coral Matthews DO    DULoxetine 60 mg Oral BID Nora Murillo MD    ferrous sulfate 325 mg Oral Daily With Breakfast Coral Matthews DO    folic acid 4,538 mcg Oral Daily Nora Murillo MD    heparin (porcine) 5,000 Units Subcutaneous Q8H [address] Nora Murillo MD    hydrALAZINE 50 mg Oral Q8H Isabella Gutierrez MD    insulin glargine 5 Units Subcutaneous HS Elvira Oaklyn, DO    insulin lispro 1-6 Units Subcutaneous TID AC Nora Murillo MD    insulin lispro 1-6 Units Subcutaneous HS Nora Murillo MD    levothyroxine 125 mcg Oral Daily Nora Murillo MD    LORazepam 1 mg Oral TID PRN Nora Murillo MD    metoprolol tartrate 50 mg Oral Q12H Isabella Gutierrez MD    predniSONE 5 mg Oral Daily Nora Murillo MD    rOPINIRole 0 25 mg Oral BID Nora Murillo MD    sertraline 200 mg Oral Daily Sarah Ho MD    sevelamer 800 mg Oral TID With Meals Sarah Ho MD    sodium bicarbonate 1,300 mg Oral TID Sarah Ho MD    tacrolimus 2 mg Oral HS Sarah Ho MD    tacrolimus 3 mg Oral QAM Sarah Ho MD         Today, Patient Was Seen By: Vidya Jain DO    ** Please Note: This note has been constructed using a voice recognition system   **

## 2019-09-12 NOTE — ASSESSMENT & PLAN NOTE
Suspect recurrent UTI  Prior culture showed pansensitive E coli    No urine culture were sent this time  Continue with IV cefepime day # 4/5

## 2019-09-12 NOTE — ASSESSMENT & PLAN NOTE
Chronic anemia  Hemoglobin drop suspect secondary to EDUARDO/ current infection  No active bleeding  Transfuse 1 unit of packed red blood cells today  Monitor

## 2019-09-12 NOTE — PLAN OF CARE
Problem: Potential for Falls  Goal: Patient will remain free of falls  Description  INTERVENTIONS:  - Assess patient frequently for physical needs  -  Identify cognitive and physical deficits and behaviors that affect risk of falls  -  [place] fall precautions as indicated by assessment   - Educate patient/family on patient safety including physical limitations  - Instruct patient to call for assistance with activity based on assessment  - Modify environment to reduce risk of injury  - Consider OT/PT consult to assist with strengthening/mobility  Outcome: Progressing     Problem: Nutrition/Hydration-ADULT  Goal: Nutrient/Hydration intake appropriate for improving, restoring or maintaining nutritional needs  Description  Monitor and assess patient's nutrition/hydration status for malnutrition  Collaborate with interdisciplinary team and initiate plan and interventions as ordered  Monitor patient's weight and dietary intake as ordered or per policy  Utilize nutrition screening tool and intervene as necessary  Determine patient's food preferences and provide high-protein, high-caloric foods as appropriate       INTERVENTIONS:  - Monitor oral intake, urinary output, labs, and treatment plans  - Assess nutrition and hydration status and recommend course of action  - Evaluate amount of meals eaten  - Assist patient with eating if necessary   - Allow adequate time for meals  - Recommend/ encourage appropriate diets, oral nutritional supplements, and vitamin/mineral supplements  - Order, calculate, and assess calorie counts as needed  - Recommend, monitor, and adjust tube feedings and TPN/PPN based on assessed needs  - Assess need for intravenous fluids  - Provide specific nutrition/hydration education as appropriate  - Include patient/family/caregiver in decisions related to nutrition  Outcome: Progressing     Problem: PAIN - ADULT  Goal: Verbalizes/displays adequate comfort level or baseline comfort level  Description  Interventions:  - Encourage patient to monitor pain and request assistance  - Assess pain using appropriate pain scale  - Administer analgesics based on type and severity of pain and evaluate response  - Implement non-pharmacological measures as appropriate and evaluate response  - Consider cultural and social influences on pain and pain management  - Notify physician/advanced practitioner if interventions unsuccessful or patient reports new pain  Outcome: Progressing     Problem: INFECTION - ADULT  Goal: Absence or prevention of progression during hospitalization  Description  INTERVENTIONS:  - Assess and monitor for signs and symptoms of infection  - Monitor lab/diagnostic results  - Monitor all insertion sites, i e  indwelling lines, tubes, and drains  - Monitor endotracheal if appropriate and nasal secretions for changes in amount and color  - [place] appropriate cooling/warming therapies per order  - Administer medications as ordered  - Instruct and encourage patient and family to use good hand hygiene technique  - Identify and instruct in appropriate isolation precautions for identified infection/condition  Outcome: Progressing  Goal: Absence of fever/infection during neutropenic period  Description  INTERVENTIONS:  - Monitor WBC    Outcome: Progressing     Problem: SAFETY ADULT  Goal: Patient will remain free of falls  Description  INTERVENTIONS:  - Assess patient frequently for physical needs  -  Identify cognitive and physical deficits and behaviors that affect risk of falls    -  [place] fall precautions as indicated by assessment   - Educate patient/family on patient safety including physical limitations  - Instruct patient to call for assistance with activity based on assessment  - Modify environment to reduce risk of injury  - Consider OT/PT consult to assist with strengthening/mobility  Outcome: Progressing  Goal: Maintain or return to baseline ADL function  Description  INTERVENTIONS:  -  Assess patient's ability to carry out ADLs; assess patient's baseline for ADL function and identify physical deficits which impact ability to perform ADLs (bathing, care of mouth/teeth, toileting, grooming, dressing, etc )  - Assess/evaluate cause of self-care deficits   - Assess range of motion  - Assess patient's mobility; develop plan if impaired  - Assess patient's need for assistive devices and provide as appropriate  - Encourage maximum independence but intervene and supervise when necessary  - Involve family in performance of ADLs  - Assess for home care needs following discharge   - Consider OT consult to assist with ADL evaluation and planning for discharge  - Provide patient education as appropriate  Outcome: Progressing  Goal: Maintain or return mobility status to optimal level  Description  INTERVENTIONS:  - Assess patient's baseline mobility status (ambulation, transfers, stairs, etc )    - Identify cognitive and physical deficits and behaviors that affect mobility  - Identify mobility aids required to assist with transfers and/or ambulation (gait belt, sit-to-stand, lift, walker, cane, etc )  - [place] fall precautions as indicated by assessment  - Record patient progress and toleration of activity level on Mobility SBAR; progress patient to next Phase/Stage  - Instruct patient to call for assistance with activity based on assessment  - Consider rehabilitation consult to assist with strengthening/weightbearing, etc   Outcome: Progressing     Problem: DISCHARGE PLANNING  Goal: Discharge to home or other facility with appropriate resources  Description  INTERVENTIONS:  - Identify barriers to discharge w/patient and caregiver  - Arrange for needed discharge resources and transportation as appropriate  - Identify discharge learning needs (meds, wound care, etc )  - Arrange for interpretive services to assist at discharge as needed  - Refer to Case Management Department for coordinating discharge planning if the patient needs post-hospital services based on physician/advanced practitioner order or complex needs related to functional status, cognitive ability, or social support system  Outcome: Progressing     Problem: Knowledge Deficit  Goal: Patient/family/caregiver demonstrates understanding of disease process, treatment plan, medications, and discharge instructions  Description  Complete learning assessment and assess knowledge base    Interventions:  - Provide teaching at level of understanding  - Provide teaching via preferred learning methods  Outcome: Progressing     Problem: COPING  Goal: Pt/Family able to verbalize concerns and demonstrate effective coping strategies  Description  INTERVENTIONS:  - Assist patient/family to identify coping skills, available support systems and cultural and spiritual values  - Provide emotional support, including active listening and acknowledgement of concerns of patient and caregivers  - Reduce environmental stimuli, as able  - Provide patient education  - Assess for spiritual pain/suffering and initiate spiritual care, including notification of Pastoral Care or husam based community as needed  - Assess effectiveness of coping strategies  Outcome: Progressing

## 2019-09-13 ENCOUNTER — TELEPHONE (OUTPATIENT)
Dept: HEMATOLOGY ONCOLOGY | Facility: CLINIC | Age: 54
End: 2019-09-13

## 2019-09-13 ENCOUNTER — TRANSITIONAL CARE MANAGEMENT (OUTPATIENT)
Dept: FAMILY MEDICINE CLINIC | Facility: CLINIC | Age: 54
End: 2019-09-13

## 2019-09-13 ENCOUNTER — TELEPHONE (OUTPATIENT)
Dept: NEPHROLOGY | Facility: CLINIC | Age: 54
End: 2019-09-13

## 2019-09-13 VITALS
HEART RATE: 58 BPM | RESPIRATION RATE: 18 BRPM | HEIGHT: 68 IN | DIASTOLIC BLOOD PRESSURE: 48 MMHG | SYSTOLIC BLOOD PRESSURE: 166 MMHG | BODY MASS INDEX: 37.76 KG/M2 | WEIGHT: 249.12 LBS | TEMPERATURE: 97.9 F | OXYGEN SATURATION: 96 %

## 2019-09-13 DIAGNOSIS — Z94.0 TRANSPLANTED KIDNEY: Primary | ICD-10-CM

## 2019-09-13 LAB
ABO GROUP BLD BPU: NORMAL
ANION GAP SERPL CALCULATED.3IONS-SCNC: 8 MMOL/L (ref 4–13)
BPU ID: NORMAL
BUN SERPL-MCNC: 52 MG/DL (ref 5–25)
CALCIUM SERPL-MCNC: 9.1 MG/DL (ref 8.3–10.1)
CHLORIDE SERPL-SCNC: 107 MMOL/L (ref 100–108)
CO2 SERPL-SCNC: 22 MMOL/L (ref 21–32)
CREAT SERPL-MCNC: 2.88 MG/DL (ref 0.6–1.3)
CROSSMATCH: NORMAL
ERYTHROCYTE [DISTWIDTH] IN BLOOD BY AUTOMATED COUNT: 17 % (ref 11.6–15.1)
GFR SERPL CREATININE-BSD FRML MDRD: 18 ML/MIN/1.73SQ M
GLUCOSE SERPL-MCNC: 105 MG/DL (ref 65–140)
GLUCOSE SERPL-MCNC: 114 MG/DL (ref 65–140)
GLUCOSE SERPL-MCNC: 142 MG/DL (ref 65–140)
HCT VFR BLD AUTO: 28.9 % (ref 34.8–46.1)
HGB BLD-MCNC: 8.7 G/DL (ref 11.5–15.4)
MAGNESIUM SERPL-MCNC: 1.8 MG/DL (ref 1.6–2.6)
MCH RBC QN AUTO: 28.4 PG (ref 26.8–34.3)
MCHC RBC AUTO-ENTMCNC: 30.1 G/DL (ref 31.4–37.4)
MCV RBC AUTO: 94 FL (ref 82–98)
PLATELET # BLD AUTO: 190 THOUSANDS/UL (ref 149–390)
PMV BLD AUTO: 13.2 FL (ref 8.9–12.7)
POTASSIUM SERPL-SCNC: 3.9 MMOL/L (ref 3.5–5.3)
RBC # BLD AUTO: 3.06 MILLION/UL (ref 3.81–5.12)
SODIUM SERPL-SCNC: 137 MMOL/L (ref 136–145)
UNIT DISPENSE STATUS: NORMAL
UNIT PRODUCT CODE: NORMAL
UNIT RH: NORMAL
WBC # BLD AUTO: 6.75 THOUSAND/UL (ref 4.31–10.16)

## 2019-09-13 PROCEDURE — 83735 ASSAY OF MAGNESIUM: CPT | Performed by: INTERNAL MEDICINE

## 2019-09-13 PROCEDURE — 85027 COMPLETE CBC AUTOMATED: CPT | Performed by: INTERNAL MEDICINE

## 2019-09-13 PROCEDURE — 82948 REAGENT STRIP/BLOOD GLUCOSE: CPT

## 2019-09-13 PROCEDURE — 80048 BASIC METABOLIC PNL TOTAL CA: CPT | Performed by: INTERNAL MEDICINE

## 2019-09-13 PROCEDURE — 99232 SBSQ HOSP IP/OBS MODERATE 35: CPT | Performed by: INTERNAL MEDICINE

## 2019-09-13 PROCEDURE — 99239 HOSP IP/OBS DSCHRG MGMT >30: CPT | Performed by: INTERNAL MEDICINE

## 2019-09-13 RX ORDER — CEPHALEXIN 250 MG/1
250 CAPSULE ORAL EVERY 8 HOURS SCHEDULED
Qty: 6 CAPSULE | Refills: 0 | Status: SHIPPED | OUTPATIENT
Start: 2019-09-13 | End: 2019-09-15

## 2019-09-13 RX ORDER — DOXAZOSIN MESYLATE 4 MG/1
4 TABLET ORAL
Qty: 30 TABLET | Refills: 0 | Status: SHIPPED | OUTPATIENT
Start: 2019-09-13 | End: 2020-01-02 | Stop reason: HOSPADM

## 2019-09-13 RX ORDER — CEPHALEXIN 250 MG/1
250 CAPSULE ORAL EVERY 8 HOURS SCHEDULED
Status: DISCONTINUED | OUTPATIENT
Start: 2019-09-13 | End: 2019-09-13 | Stop reason: HOSPADM

## 2019-09-13 RX ORDER — FERROUS SULFATE 325(65) MG
325 TABLET ORAL
Qty: 30 TABLET | Refills: 0 | Status: SHIPPED | OUTPATIENT
Start: 2019-09-14 | End: 2020-01-01 | Stop reason: ALTCHOICE

## 2019-09-13 RX ORDER — POLYETHYLENE GLYCOL 3350 17 G/17G
17 POWDER, FOR SOLUTION ORAL DAILY
Qty: 14 EACH | Refills: 0
Start: 2019-09-14 | End: 2019-10-15 | Stop reason: ALTCHOICE

## 2019-09-13 RX ADMIN — PREDNISONE 5 MG: 5 TABLET ORAL at 08:16

## 2019-09-13 RX ADMIN — HEPARIN SODIUM 5000 UNITS: 5000 INJECTION, SOLUTION INTRAVENOUS; SUBCUTANEOUS at 05:04

## 2019-09-13 RX ADMIN — FOLIC ACID 1000 MCG: 1 TABLET ORAL at 08:17

## 2019-09-13 RX ADMIN — SERTRALINE HYDROCHLORIDE 200 MG: 100 TABLET ORAL at 08:16

## 2019-09-13 RX ADMIN — CLONIDINE HYDROCHLORIDE 0.3 MG: 0.1 TABLET ORAL at 05:07

## 2019-09-13 RX ADMIN — ACETAMINOPHEN 650 MG: 325 TABLET ORAL at 02:28

## 2019-09-13 RX ADMIN — METOPROLOL TARTRATE 50 MG: 50 TABLET, FILM COATED ORAL at 08:17

## 2019-09-13 RX ADMIN — LEVOTHYROXINE SODIUM 125 MCG: 125 TABLET ORAL at 05:04

## 2019-09-13 RX ADMIN — SODIUM BICARBONATE 650 MG TABLET 1300 MG: at 08:16

## 2019-09-13 RX ADMIN — HYDRALAZINE HYDROCHLORIDE 50 MG: 50 TABLET ORAL at 05:07

## 2019-09-13 RX ADMIN — ROPINIROLE HYDROCHLORIDE 0.25 MG: 0.25 TABLET, FILM COATED ORAL at 08:16

## 2019-09-13 RX ADMIN — TACROLIMUS 3 MG: 1 CAPSULE ORAL at 08:17

## 2019-09-13 RX ADMIN — ASPIRIN 81 MG 81 MG: 81 TABLET ORAL at 08:17

## 2019-09-13 RX ADMIN — AMLODIPINE BESYLATE 10 MG: 10 TABLET ORAL at 08:17

## 2019-09-13 RX ADMIN — SENNOSIDES AND DOCUSATE SODIUM 1 TABLET: 8.6; 5 TABLET ORAL at 08:17

## 2019-09-13 RX ADMIN — LORAZEPAM 1 MG: 1 TABLET ORAL at 10:17

## 2019-09-13 RX ADMIN — SEVELAMER HYDROCHLORIDE 800 MG: 800 TABLET, FILM COATED PARENTERAL at 12:18

## 2019-09-13 RX ADMIN — FERROUS SULFATE TAB 325 MG (65 MG ELEMENTAL FE) 325 MG: 325 (65 FE) TAB at 08:16

## 2019-09-13 RX ADMIN — SEVELAMER HYDROCHLORIDE 800 MG: 800 TABLET, FILM COATED PARENTERAL at 08:16

## 2019-09-13 RX ADMIN — DULOXETINE HYDROCHLORIDE 60 MG: 60 CAPSULE, DELAYED RELEASE ORAL at 08:16

## 2019-09-13 RX ADMIN — POLYETHYLENE GLYCOL 3350 17 G: 17 POWDER, FOR SOLUTION ORAL at 08:17

## 2019-09-13 NOTE — TELEPHONE ENCOUNTER
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:  She was admitted for acute kidney injury  Creatinine elevated to 4 4 on admission from baseline creatinine of 1 8-2 1  Patient also has elevated peripheral eosinophil count to 14%  on 09/10  Acute kidney injury likely multifactorial- prerenal from poor oral intake, also possibility of AIN from use of Revlimid as she has peripheral eosinophilia  Urine eosinophil was negative  Less likely postrenal as transplant kidney ultrasound was negative for hydronephrosis  Renal function already improving with IV fluids and creatinine is down to 2 88 9/13  Acidosis resolved  Less likely transplant rejection as renal function already improving with IV fluids  Postvoid residual not significant  Previously checked Prograf level was acceptable but was elevated to 11 6 on 9/10 am and was 3 4 on 9/11? Timing of blood collection  Will need to monitor as outpatient once renal function stabilizes  Continue same dose of tacrolimus for now  Will continue to hold diuretic at the time of discharge  Continue same dose of Prograf  Blood pressure was elevated, dose of Cardura was increased to 4 mg at bedtime, she has a component of anxiety as she is moving to [place]  If blood pressure remains elevated may consider increasing dose of hydralazine  Dr Cass Hand agreed to holding Revlimid for now- will need to decide on long term plan  MA TEAM:  Please order a repeat BMP to be done in 4 days for acute kidney injury  Please also order Prograf level for kidney transplant status  Please arrange for outpatient follow-up with Dr Avis Yuen within 1 week      Thank you  Mario Amaro

## 2019-09-13 NOTE — PLAN OF CARE
Problem: Potential for Falls  Goal: Patient will remain free of falls  Description  INTERVENTIONS:  - Assess patient frequently for physical needs  -  Identify cognitive and physical deficits and behaviors that affect risk of falls  -  [place] fall precautions as indicated by assessment   - Educate patient/family on patient safety including physical limitations  - Instruct patient to call for assistance with activity based on assessment  - Modify environment to reduce risk of injury  - Consider OT/PT consult to assist with strengthening/mobility  Outcome: Progressing     Problem: Nutrition/Hydration-ADULT  Goal: Nutrient/Hydration intake appropriate for improving, restoring or maintaining nutritional needs  Description  Monitor and assess patient's nutrition/hydration status for malnutrition  Collaborate with interdisciplinary team and initiate plan and interventions as ordered  Monitor patient's weight and dietary intake as ordered or per policy  Utilize nutrition screening tool and intervene as necessary  Determine patient's food preferences and provide high-protein, high-caloric foods as appropriate       INTERVENTIONS:  - Monitor oral intake, urinary output, labs, and treatment plans  - Assess nutrition and hydration status and recommend course of action  - Evaluate amount of meals eaten  - Assist patient with eating if necessary   - Allow adequate time for meals  - Recommend/ encourage appropriate diets, oral nutritional supplements, and vitamin/mineral supplements  - Order, calculate, and assess calorie counts as needed  - Recommend, monitor, and adjust tube feedings and TPN/PPN based on assessed needs  - Assess need for intravenous fluids  - Provide specific nutrition/hydration education as appropriate  - Include patient/family/caregiver in decisions related to nutrition  Outcome: Progressing     Problem: PAIN - ADULT  Goal: Verbalizes/displays adequate comfort level or baseline comfort level  Description  Interventions:  - Encourage patient to monitor pain and request assistance  - Assess pain using appropriate pain scale  - Administer analgesics based on type and severity of pain and evaluate response  - Implement non-pharmacological measures as appropriate and evaluate response  - Consider cultural and social influences on pain and pain management  - Notify physician/advanced practitioner if interventions unsuccessful or patient reports new pain  Outcome: Progressing     Problem: INFECTION - ADULT  Goal: Absence or prevention of progression during hospitalization  Description  INTERVENTIONS:  - Assess and monitor for signs and symptoms of infection  - Monitor lab/diagnostic results  - Monitor all insertion sites, i e  indwelling lines, tubes, and drains  - Monitor endotracheal if appropriate and nasal secretions for changes in amount and color  - [place] appropriate cooling/warming therapies per order  - Administer medications as ordered  - Instruct and encourage patient and family to use good hand hygiene technique  - Identify and instruct in appropriate isolation precautions for identified infection/condition  Outcome: Progressing  Goal: Absence of fever/infection during neutropenic period  Description  INTERVENTIONS:  - Monitor WBC    Outcome: Progressing     Problem: SAFETY ADULT  Goal: Patient will remain free of falls  Description  INTERVENTIONS:  - Assess patient frequently for physical needs  -  Identify cognitive and physical deficits and behaviors that affect risk of falls    -  [place] fall precautions as indicated by assessment   - Educate patient/family on patient safety including physical limitations  - Instruct patient to call for assistance with activity based on assessment  - Modify environment to reduce risk of injury  - Consider OT/PT consult to assist with strengthening/mobility  Outcome: Progressing  Goal: Maintain or return to baseline ADL function  Description  INTERVENTIONS:  -  Assess patient's ability to carry out ADLs; assess patient's baseline for ADL function and identify physical deficits which impact ability to perform ADLs (bathing, care of mouth/teeth, toileting, grooming, dressing, etc )  - Assess/evaluate cause of self-care deficits   - Assess range of motion  - Assess patient's mobility; develop plan if impaired  - Assess patient's need for assistive devices and provide as appropriate  - Encourage maximum independence but intervene and supervise when necessary  - Involve family in performance of ADLs  - Assess for home care needs following discharge   - Consider OT consult to assist with ADL evaluation and planning for discharge  - Provide patient education as appropriate  Outcome: Progressing  Goal: Maintain or return mobility status to optimal level  Description  INTERVENTIONS:  - Assess patient's baseline mobility status (ambulation, transfers, stairs, etc )    - Identify cognitive and physical deficits and behaviors that affect mobility  - Identify mobility aids required to assist with transfers and/or ambulation (gait belt, sit-to-stand, lift, walker, cane, etc )  - [place] fall precautions as indicated by assessment  - Record patient progress and toleration of activity level on Mobility SBAR; progress patient to next Phase/Stage  - Instruct patient to call for assistance with activity based on assessment  - Consider rehabilitation consult to assist with strengthening/weightbearing, etc   Outcome: Progressing     Problem: DISCHARGE PLANNING  Goal: Discharge to home or other facility with appropriate resources  Description  INTERVENTIONS:  - Identify barriers to discharge w/patient and caregiver  - Arrange for needed discharge resources and transportation as appropriate  - Identify discharge learning needs (meds, wound care, etc )  - Arrange for interpretive services to assist at discharge as needed  - Refer to Case Management Department for coordinating discharge planning if the patient needs post-hospital services based on physician/advanced practitioner order or complex needs related to functional status, cognitive ability, or social support system  Outcome: Progressing     Problem: Knowledge Deficit  Goal: Patient/family/caregiver demonstrates understanding of disease process, treatment plan, medications, and discharge instructions  Description  Complete learning assessment and assess knowledge base    Interventions:  - Provide teaching at level of understanding  - Provide teaching via preferred learning methods  Outcome: Progressing     Problem: COPING  Goal: Pt/Family able to verbalize concerns and demonstrate effective coping strategies  Description  INTERVENTIONS:  - Assist patient/family to identify coping skills, available support systems and cultural and spiritual values  - Provide emotional support, including active listening and acknowledgement of concerns of patient and caregivers  - Reduce environmental stimuli, as able  - Provide patient education  - Assess for spiritual pain/suffering and initiate spiritual care, including notification of Pastoral Care or husam based community as needed  - Assess effectiveness of coping strategies  Outcome: Progressing

## 2019-09-13 NOTE — DISCHARGE SUMMARY
Discharge- Kathrin Hammans 1965, 53 y o  female MRN: [id number]    [facility] Encounter: [id number]    Primary Care Provider: Mercedes Coleman MD   Date and time admitted to hospital: 9/9/2019  4:37 PM        Discharge Summary - [facility]    Patient Information: Kathrin Hammans 53 y o  female MRN: [id number]  Unit/Bed#: [facility] 807-01 Encounter: [id number]    Discharging Physician / Practitioner: Vidya Jain DO  PCP: Mercedes Coleman MD  Admission Date: 9/9/2019  Discharge Date: 09/13/19    Disposition:     Home    Reason for Admission:   UTI  EDUARDO/CKD stage IV  Acute metabolic encephalopathy secondary to above    Discharge Diagnoses:     Principal Problem:    UTI (urinary tract infection)  Active Problems:    Acute renal failure superimposed on stage 4 chronic kidney disease (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 )    Essential hypertension    Anemia    Chronic diastolic congestive heart failure ([facility] )    Atrial fibrillation ([facility] )    Multiple myeloma not having achieved remission ([facility] )  Resolved Problems:    * No resolved hospital problems  *      Consultations During Hospital Stay:  · Nephrology    Procedures Performed:   1 unit of blood transfusion  ·     Significant Findings / Test Results:     · As above    Incidental Findings:   · none    Test Results Pending at Discharge (will require follow up):   · none     Outpatient Tests Requested:  · Outpatient nephrology follow-up with repeat BMP in 3-4 days    Complications:  none    Hospital Course:      Kathrin Hammans is a 53 y o  female patient who originally presented to the hospital on 9/9/2019 due to altered mental status  Patient with chronic kidney disease stage 4, multiple myeloma, hypertension, diabetes mellitus type 1, kidney and pancreatic transplant, was sent to the hospital due to elevated creatinine and dysuria  Patient was found to have UTI, urine culture were not sent, she had a recent urine culture positive for E coli, she was treated with IV antibiotic and  switched to oral Keflex to finish 6 days course    Nephrology consulted regarding EDUARDO /CKD stage IV and metabolic acidosis, it was felt multifactorial with possibility of AIN from the use of Revlimid  Revlimid was held, patient was hydrated with IV fluid with improvement in her condition  Hematology recommended continue to hold Revlimid for now  Toxic metabolic encephalopathy has resolved    Currently patient is in stable condition, she will be discharged home on 3 more days of antibiotic instructed to  continue to hold Revlimid and Lasix with plan for outpatient you nephrology follow-up    She received 1 unit of blood transfusion in the hospital due to hemoglobin drop without active bleeding    Condition at Discharge: stable     Discharge Day Visit / Exam:     Subjective:    Patient seen and examined  Comfortable sitting in chair  Anxious to go home  No dysuria  Vitals: Blood Pressure: (!) 166/48 (09/13/19 1020)  Pulse: 58 (09/13/19 1020)  Temperature: 97 9 °F (36 6 °C) (09/13/19 0734)  Temp Source: Oral (09/09/19 1634)  Respirations: 18 (09/13/19 0734)  Height: 5' 8" (172 7 cm) (09/10/19 0300)  Weight - Scale: 113 kg (249 lb 1 9 oz) (09/10/19 0300)  SpO2: 96 % (09/13/19 0734)  Exam:   Physical Exam  Patient is awake alert oriented in no acute distress  Lung clear to auscultation bilateral  Heart positive S1-S2 no murmur  Abdomen soft nontender  Lower extremities no edema  Discussion with Family: no    Discharge instructions/Information to patient and family:   See after visit summary for information provided to patient and family  Provisions for Follow-Up Care:  See after visit summary for information related to follow-up care and any pertinent home health orders        Planned Readmission: no     Discharge Statement:  I spent 38  minutes discharging the patient  This time was spent on the day of discharge  I had direct contact with the patient on the day of discharge  Greater than 50% of the total time was spent examining patient, answering all patient questions, arranging and discussing plan of care with patient as well as directly providing post-discharge instructions  Additional time then spent on discharge activities  Discharge Medications:  See after visit summary for reconciled discharge medications provided to patient and family        ** Please Note: This note has been constructed using a voice recognition system **

## 2019-09-13 NOTE — PLAN OF CARE
Problem: Potential for Falls  Goal: Patient will remain free of falls  Description  INTERVENTIONS:  - Assess patient frequently for physical needs  -  Identify cognitive and physical deficits and behaviors that affect risk of falls  -  [place] fall precautions as indicated by assessment   - Educate patient/family on patient safety including physical limitations  - Instruct patient to call for assistance with activity based on assessment  - Modify environment to reduce risk of injury  - Consider OT/PT consult to assist with strengthening/mobility  Outcome: Adequate for Discharge     Problem: Nutrition/Hydration-ADULT  Goal: Nutrient/Hydration intake appropriate for improving, restoring or maintaining nutritional needs  Description  Monitor and assess patient's nutrition/hydration status for malnutrition  Collaborate with interdisciplinary team and initiate plan and interventions as ordered  Monitor patient's weight and dietary intake as ordered or per policy  Utilize nutrition screening tool and intervene as necessary  Determine patient's food preferences and provide high-protein, high-caloric foods as appropriate       INTERVENTIONS:  - Monitor oral intake, urinary output, labs, and treatment plans  - Assess nutrition and hydration status and recommend course of action  - Evaluate amount of meals eaten  - Assist patient with eating if necessary   - Allow adequate time for meals  - Recommend/ encourage appropriate diets, oral nutritional supplements, and vitamin/mineral supplements  - Order, calculate, and assess calorie counts as needed  - Recommend, monitor, and adjust tube feedings and TPN/PPN based on assessed needs  - Assess need for intravenous fluids  - Provide specific nutrition/hydration education as appropriate  - Include patient/family/caregiver in decisions related to nutrition  Outcome: Adequate for Discharge     Problem: PAIN - ADULT  Goal: Verbalizes/displays adequate comfort level or baseline comfort level  Description  Interventions:  - Encourage patient to monitor pain and request assistance  - Assess pain using appropriate pain scale  - Administer analgesics based on type and severity of pain and evaluate response  - Implement non-pharmacological measures as appropriate and evaluate response  - Consider cultural and social influences on pain and pain management  - Notify physician/advanced practitioner if interventions unsuccessful or patient reports new pain  Outcome: Adequate for Discharge     Problem: INFECTION - ADULT  Goal: Absence or prevention of progression during hospitalization  Description  INTERVENTIONS:  - Assess and monitor for signs and symptoms of infection  - Monitor lab/diagnostic results  - Monitor all insertion sites, i e  indwelling lines, tubes, and drains  - Monitor endotracheal if appropriate and nasal secretions for changes in amount and color  - [place] appropriate cooling/warming therapies per order  - Administer medications as ordered  - Instruct and encourage patient and family to use good hand hygiene technique  - Identify and instruct in appropriate isolation precautions for identified infection/condition  Outcome: Adequate for Discharge  Goal: Absence of fever/infection during neutropenic period  Description  INTERVENTIONS:  - Monitor WBC    Outcome: Adequate for Discharge     Problem: SAFETY ADULT  Goal: Patient will remain free of falls  Description  INTERVENTIONS:  - Assess patient frequently for physical needs  -  Identify cognitive and physical deficits and behaviors that affect risk of falls    -  [place] fall precautions as indicated by assessment   - Educate patient/family on patient safety including physical limitations  - Instruct patient to call for assistance with activity based on assessment  - Modify environment to reduce risk of injury  - Consider OT/PT consult to assist with strengthening/mobility  Outcome: Adequate for Discharge  Goal: Maintain or return to baseline ADL function  Description  INTERVENTIONS:  -  Assess patient's ability to carry out ADLs; assess patient's baseline for ADL function and identify physical deficits which impact ability to perform ADLs (bathing, care of mouth/teeth, toileting, grooming, dressing, etc )  - Assess/evaluate cause of self-care deficits   - Assess range of motion  - Assess patient's mobility; develop plan if impaired  - Assess patient's need for assistive devices and provide as appropriate  - Encourage maximum independence but intervene and supervise when necessary  - Involve family in performance of ADLs  - Assess for home care needs following discharge   - Consider OT consult to assist with ADL evaluation and planning for discharge  - Provide patient education as appropriate  Outcome: Adequate for Discharge  Goal: Maintain or return mobility status to optimal level  Description  INTERVENTIONS:  - Assess patient's baseline mobility status (ambulation, transfers, stairs, etc )    - Identify cognitive and physical deficits and behaviors that affect mobility  - Identify mobility aids required to assist with transfers and/or ambulation (gait belt, sit-to-stand, lift, walker, cane, etc )  - [place] fall precautions as indicated by assessment  - Record patient progress and toleration of activity level on Mobility SBAR; progress patient to next Phase/Stage  - Instruct patient to call for assistance with activity based on assessment  - Consider rehabilitation consult to assist with strengthening/weightbearing, etc   Outcome: Adequate for Discharge     Problem: DISCHARGE PLANNING  Goal: Discharge to home or other facility with appropriate resources  Description  INTERVENTIONS:  - Identify barriers to discharge w/patient and caregiver  - Arrange for needed discharge resources and transportation as appropriate  - Identify discharge learning needs (meds, wound care, etc )  - Arrange for interpretive services to assist at discharge as needed  - Refer to Case Management Department for coordinating discharge planning if the patient needs post-hospital services based on physician/advanced practitioner order or complex needs related to functional status, cognitive ability, or social support system  Outcome: Adequate for Discharge     Problem: Knowledge Deficit  Goal: Patient/family/caregiver demonstrates understanding of disease process, treatment plan, medications, and discharge instructions  Description  Complete learning assessment and assess knowledge base    Interventions:  - Provide teaching at level of understanding  - Provide teaching via preferred learning methods  Outcome: Adequate for Discharge     Problem: COPING  Goal: Pt/Family able to verbalize concerns and demonstrate effective coping strategies  Description  INTERVENTIONS:  - Assist patient/family to identify coping skills, available support systems and cultural and spiritual values  - Provide emotional support, including active listening and acknowledgement of concerns of patient and caregivers  - Reduce environmental stimuli, as able  - Provide patient education  - Assess for spiritual pain/suffering and initiate spiritual care, including notification of Pastoral Care or husam based community as needed  - Assess effectiveness of coping strategies  Outcome: Adequate for Discharge

## 2019-09-13 NOTE — SOCIAL WORK
Cm reviewed patient during care coordination rounds with Dr Rasheeda Nolen  Patient stable for discharge home today  PT recommending OP therapy as patient declined [facility] and patient reported her parents are able to transport patient to OP therapy sessions  Cm provided OP therapy script with list of locations  Cm following

## 2019-09-13 NOTE — TELEPHONE ENCOUNTER
Call received from Estefani Gibbs stating patient was seen in the ER and Revlimid on hold  Notes in [facility]  Pharm is requesting a call back if possible to see how long Revlimid will be on hold  Stephy Bull RN contacted regarding same

## 2019-09-13 NOTE — PROGRESS NOTES
NEPHROLOGY PROGRESS NOTE   Redd Mascorro 53 y o  female MRN: [id number]  Unit/Bed#: [facility] 807-01 Encounter: [id number]    ASSESSMENT & PLAN:     54-year-old female with past history of chronic kidney disease stage 4 status post kidney and pancreas transplant in 1998, failed pancreas transplant in 2017, hypertension, type 1 diabetes mellitus, recurrent UTI and pyelonephritis, history of MGUS progression to multiple myeloma diagnosed on bone marrow biopsy in April 2019, chronic kidney disease stage 4 with recent baseline creatinine 1 8-2 1, follows with Dr Zeenat Mohamud as outpatient, 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 was admitted to the hospital for worsening renal function   Creatinine was elevated to 4 42 on 09/09 from previous baseline creatinine from 08/17 of 2 13   Also bicarb level was low at 12 despite being compliant with oral bicarbonate tablet at home   Patient was complaining of poor appetite for last few days and also complaining of pain and burning during urination   Suspected of having UTI and started on IV antibiotics      A/P  ·  Acute kidney injury, POA:    · Creatinine elevated to 4 4 on admission from baseline creatinine of 1 8-2 1   Patient also has elevated peripheral eosinophil count to 14%  on 09/10   Acute kidney injury likely multifactorial- prerenal from poor oral intake, also possibility of AIN from use of Revlimid as she has peripheral eosinophilia  Urine eosinophil was negative  Less likely postrenal as transplant kidney ultrasound was negative for hydronephrosis   Renal function already improving with IV fluids and creatinine is down to 2 88   Acidosis resolved  Patient is of IV fluid since 09/02  Less likely transplant rejection as renal function already improving with IV fluids  Postvoid residual not significant     Previously checked Prograf level was acceptable but was elevated to 11 6 on 9/10 am and was 3 4 on 9/11? Timing of blood collection   Will need to monitor as outpatient once renal function stabilizes  Continue same dose of tacrolimus for now  · Continue holding diuretics  · Discussed with Dr Jamaica Clayton on 09/10- Okay to hold Revlimid for now   Continue monitoring renal function, expect renal function to improve further  · Will need repeat BMP as outpatient in 3-4 days  Will arrange for outpatient follow-up with Dr Batsheva Norris    · Metabolic acidosis, non-anion gap:  Positive urine anion gap suggesting RTA   Continue sodium bicarbonate at home dose     · Chronic kidney disease stage 4:  Baseline creatinine 1 8-2 1   Follows with Dr Batsheva Norris as outpatient  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  · Chronic immunosuppressive treatment for status post kidney and pancreas transplant   Continue Prograf and prednisone at current dose  Currently on Prograf 2 mg daily at bedtime and 3 mg daily in the morning and prednisone 5 mg daily  · Primary hypertension with chronic kidney disease stage 4:  Blood pressure currently elevated-likely due to anxiety   She had increased dose of Cardura to 4 mg at bedtime from previous dose of 2 mg on 9/12    Continue metoprolol, was considering changing metoprolol to carvedilol but heart rate around 58-59, so not changing due to risk of bradycardia  Currently on amlodipine 10 mg daily, clonidine 0 3 mg every 8 hours  Can increase the dose of hydralazine to 100 mg t i d  If Blood pressure remains elevated  Stressed on home monitoring of blood pressure  · Hypokalemia:  Resolved  · Anemia:  Multifactorial   Continue to monitor   Follow-up Hematology recommendations, hemoglobin stable at 8 7 today  iron stores-iron saturation 10%, ferritin 85, started on oral ferrous sulfate at 325 mg daily on 09/12  Not doing IV iron due to current UTI  Not considering LEO due to multiple myeloma  status post 1 unit PRBC on 09/12  Recommend outpatient follow-up with Hematology    · Hyperphosphatemia, likely due to acute kidney injury   Recommend renal diet and continue sevelamer   Continue to monitor phosphorus level, expect improvement with improvement renal function, last phosphorus level improved to 4 6  · Multiple myeloma:  Diagnosed in April 2019   was on Revlimid, currently on hold due to possible AIN  Recommend follow-up with Oncology as outpatient  · UTI, recurrent   UA positive   Urine culture was not sent   Continue antibiotics per primary team   Urinary symptoms have subsided  now on oral keflex      Discussed with DR Kathryn Hahn , stable for outpatient care from Nephrology side  Will schedule outpatient follow-up with Dr Riddhi Kan  Will repeat BMP and Tac level in 3-4 days  Office informed  Continue holding Lasix 20 mg daily for now, recommend daily weight monitoring at home and monitoring for fluid overload  SUBJECTIVE:   No shortness of breath  [facility]aining of [facility]  Current Weight: Weight - Scale: 113 kg (249 lb 1 9 oz)  Vitals:    09/13/19 1020   BP: (!) 166/48   Pulse: 58   Resp:    Temp:    SpO2:        Intake/Output Summary (Last 24 hours) at 9/13/2019 1050  Last data filed at 9/13/2019 0800  Gross per 24 hour   Intake 1460 ml   Output 1875 ml   Net -415 ml       Physical Exam  General:  Ill looking, awake  Eyes: Conjunctivae pink,  Sclera anicteric  ENT: lips and mucous membranes moist  Neck: supple   Chest: Clear to Auscultation both lungs,  no crackles, ronchus or wheezing  CVS: S1 & S2 present, normal rate, regular rhythm, no murmur    Abdomen: soft, non-tender, non-distended, Bowel sounds normoactive  Extremities: no edema of  legs  Skin: no rash  Neuro: awake, alert, oriented x3      Medications:    Current Facility-Administered Medications:     acetaminophen (TYLENOL) tablet 650 mg, 650 mg, Oral, Q6H PRN, Kourtney Ortiz PA-C, 650 mg at 09/13/19 0228    amLODIPine (NORVASC) tablet 10 mg, 10 mg, Oral, Daily, Estrellita Merrill MD, 10 mg at 09/13/19 0817    ARIPiprazole (ABILIFY) tablet 30 mg, 30 mg, Oral, HS, Melissa Irwin MD, 30 mg at 09/12/19 2124    aspirin chewable tablet 81 mg, 81 mg, Oral, Daily, Melissa Irwin MD, 81 mg at 09/13/19 0817    cefepime (MAXIPIME) 500 mg in sodium chloride 0 9 % 50 mL IVPB, 500 mg, Intravenous, Q24H, Melissa Irwin MD, Stopped at 09/12/19 1815    cloNIDine (CATAPRES) tablet 0 3 mg, 0 3 mg, Oral, Q8H [facility], Melissa Irwin MD, 0 3 mg at 09/13/19 0507    doxazosin (CARDURA) tablet 4 mg, 4 mg, Oral, HS, Coral Matthews DO, 4 mg at 09/12/19 2124    DULoxetine (CYMBALTA) delayed release capsule 60 mg, 60 mg, Oral, BID, Melissa Irwin MD, 60 mg at 09/13/19 0816    ferrous sulfate tablet 325 mg, 325 mg, Oral, Daily With Breakfast, Coral Matthews DO, 325 mg at 95/13/44 3894    folic acid (FOLVITE) tablet 1,000 mcg, 1,000 mcg, Oral, Daily, Melissa Irwin MD, 1,000 mcg at 09/13/19 0817    heparin (porcine) subcutaneous injection 5,000 Units, 5,000 Units, Subcutaneous, Q8H [facility], 5,000 Units at 09/13/19 0504 **AND** [COMPLETED] Platelet count, , , Once, Melissa Irwin MD    hydrALAZINE (APRESOLINE) tablet 50 mg, 50 mg, Oral, Q8H [facility], Melissa Irwin MD, 50 mg at 09/13/19 0507    insulin glargine (LANTUS) subcutaneous injection 5 Units 0 05 mL, 5 Units, Subcutaneous, HS, Mike Loya DO, 5 Units at 09/12/19 2129    insulin lispro (HumaLOG) 100 units/mL subcutaneous injection 1-6 Units, 1-6 Units, Subcutaneous, TID AC, 1 Units at 09/12/19 1131 **AND** Fingerstick Glucose (POCT), , , TID AC, Melissa Irwin MD    insulin lispro (HumaLOG) 100 units/mL subcutaneous injection 1-6 Units, 1-6 Units, Subcutaneous, HS, Melissa Irwin MD, 2 Units at 09/11/19 2122    Labetalol HCl (NORMODYNE) injection 10 mg, 10 mg, Intravenous, Q6H PRN, Kourtney Ortiz PA-C    levothyroxine tablet 125 mcg, 125 mcg, Oral, Daily, Melissa Irwin MD, 125 mcg at 09/13/19 0504    LORazepam (ATIVAN) tablet 1 mg, 1 mg, Oral, TID PRN, Melissa Irwin MD, 1 mg at 09/13/19 1017    metoprolol tartrate (LOPRESSOR) tablet 50 mg, 50 mg, Oral, Q12H [facility], Silver Garsia MD, 50 mg at 09/13/19 0817    polyethylene glycol (MIRALAX) packet 17 g, 17 g, Oral, Daily, Eli Chung DO, 17 g at 09/13/19 0817    predniSONE tablet 5 mg, 5 mg, Oral, Daily, Silver Garsia MD, 5 mg at 09/13/19 0816    rOPINIRole (REQUIP) tablet 0 25 mg, 0 25 mg, Oral, BID, Silver Garsia MD, 0 25 mg at 09/13/19 0816    senna-docusate sodium (SENOKOT S) 8 6-50 mg per tablet 1 tablet, 1 tablet, Oral, BID, Eli Chung DO, 1 tablet at 09/13/19 0817    sertraline (ZOLOFT) tablet 200 mg, 200 mg, Oral, Daily, Silver Garsia MD, 200 mg at 09/13/19 0816    sevelamer (RENAGEL) tablet 800 mg, 800 mg, Oral, TID With Meals, Silver Garsia MD, 800 mg at 09/13/19 0816    sodium bicarbonate tablet 1,300 mg, 1,300 mg, Oral, TID, Silver Garsia MD, 1,300 mg at 09/13/19 0816    tacrolimus (PROGRAF) capsule 2 mg, 2 mg, Oral, HS, Silver Garsia MD, 2 mg at 09/12/19 2124    tacrolimus (PROGRAF) capsule 3 mg, 3 mg, Oral, QAM, Silver Garsia MD, 3 mg at 09/13/19 0817    Invasive Devices:        Lab Results:   Results from last 7 days   Lab Units 09/13/19  0457 09/12/19  0503 09/11/19  0537  09/09/19  0859   WBC Thousand/uL 6 75 6 45 7 42   < > 6 50   HEMOGLOBIN g/dL 8 7* 7 2* 7 5*   < > 8 2*   HEMATOCRIT % 28 9* 24 2* 24 5*   < > 28 5*   PLATELETS Thousands/uL 190 185 202   < > 209   POTASSIUM mmol/L 3 9 3 4* 3 4*   < > 4 4   CHLORIDE mmol/L 107 105 107   < > 112*   CO2 mmol/L 22 22 20*   < > 12*   BUN mg/dL 52* 64* 63*   < > 85*   CREATININE mg/dL 2 88* 3 01* 3 47*   < > 4 42*   CALCIUM mg/dL 9 1 7 9* 7 7*   < > 7 6*   MAGNESIUM mg/dL 1 8  --   --   --  2 4   PHOSPHORUS mg/dL  --  4 6*  --   --  7 7*   ALK PHOS U/L  --   --   --   --  127*   ALT U/L  --   --   --   --  26   AST U/L  --   --   --   --  23    < > = values in this interval not displayed  Previous work up:      Portions of the record may have been created with voice recognition software   Occasional wrong word or "sound a like" substitutions may have occurred due to the inherent limitations of voice recognition software  Read the chart carefully and recognize, using context, where substitutions have occurred  If you have any questions, please contact the dictating provider

## 2019-09-14 DIAGNOSIS — E78.2 MIXED HYPERLIPIDEMIA: ICD-10-CM

## 2019-09-14 DIAGNOSIS — F33.42 MAJOR DEPRESSIVE DISORDER, RECURRENT EPISODE, IN FULL REMISSION (HCC): Chronic | ICD-10-CM

## 2019-09-14 DIAGNOSIS — F41.1 GAD (GENERALIZED ANXIETY DISORDER): Chronic | ICD-10-CM

## 2019-09-14 RX ORDER — PRAVASTATIN SODIUM 80 MG/1
TABLET ORAL
Qty: 90 TABLET | Refills: 3 | Status: SHIPPED | OUTPATIENT
Start: 2019-09-14 | End: 2019-10-04 | Stop reason: HOSPADM

## 2019-09-16 ENCOUNTER — EPISODE CHANGES (OUTPATIENT)
Dept: CASE MANAGEMENT | Facility: HOSPITAL | Age: 54
End: 2019-09-16

## 2019-09-16 RX ORDER — DULOXETIN HYDROCHLORIDE 60 MG/1
CAPSULE, DELAYED RELEASE ORAL
Qty: 180 CAPSULE | Refills: 0 | OUTPATIENT
Start: 2019-09-16

## 2019-09-16 NOTE — TELEPHONE ENCOUNTER
Hello    Thank you  This is not ideal     Is she agreeable to have labs at least before she goes?     Thank you    np

## 2019-09-16 NOTE — TELEPHONE ENCOUNTER
Called pt this morniging to see if she was able to get labs done this morning before her appt  She let me know she was actually going to call us to cancel and wouldn't be able to harjeet until after 9/30 because she's going to [facility]  So her appt was moved to 10/11 with Frank Stewart and mailing out her labs

## 2019-09-16 NOTE — TELEPHONE ENCOUNTER
Called pt and she stated she will not have the time to do it before she leaves but ensured me that she will get it done as soon she gets back

## 2019-09-17 NOTE — TELEPHONE ENCOUNTER
Tc to pt to assess where she is with taking the revlimid  Left voice msg asking pt to call our office that diplomat is calling about refilling the medication  Awaiting return call

## 2019-09-17 NOTE — TELEPHONE ENCOUNTER
Tc to diplomat spoke with Bhavya Hall RN explained revlimid on hold fu appt  on 10/4/19 tx plan will be made then  Bhavya Hall stated if they do not hear from us her office will call back

## 2019-09-17 NOTE — TELEPHONE ENCOUNTER
Pt returned tc states that she is leaving for 2 week vacation and does not want to start revlimid until after vacation due to problems with rashes in past   Also stated she has talked to diplomat and told them this  Fu appt  was made for 10/4/19 with Kirstie ZAPATA to discuss plan going forward

## 2019-09-17 NOTE — TELEPHONE ENCOUNTER
Tessa Soto from [facility] called to find out how long the patient's Revlimid will be on hold  I did note that the patient was inpatient and discharged 9/13  Tessa Soto can be reached at [phone]

## 2019-09-30 ENCOUNTER — TELEPHONE (OUTPATIENT)
Dept: NEPHROLOGY | Facility: CLINIC | Age: 54
End: 2019-09-30

## 2019-09-30 NOTE — TELEPHONE ENCOUNTER
Patient called the office stating that she is having incontinence and isn't feeling well    She said Dr Will Liu has helped her in the past with this situation so she was wondering if he could help her again  Her best call back number is [phone]

## 2019-10-01 ENCOUNTER — TELEPHONE (OUTPATIENT)
Dept: NEPHROLOGY | Facility: CLINIC | Age: 54
End: 2019-10-01

## 2019-10-01 ENCOUNTER — HOSPITAL ENCOUNTER (INPATIENT)
Facility: HOSPITAL | Age: 54
LOS: 3 days | Discharge: HOME | DRG: 682 | End: 2019-10-04
Attending: EMERGENCY MEDICINE | Admitting: INTERNAL MEDICINE
Payer: MEDICARE

## 2019-10-01 ENCOUNTER — APPOINTMENT (EMERGENCY)
Dept: RADIOLOGY | Facility: HOSPITAL | Age: 54
DRG: 682 | End: 2019-10-01
Payer: MEDICARE

## 2019-10-01 DIAGNOSIS — N18.4 CHRONIC KIDNEY DISEASE, STAGE 4 (SEVERE) (HCC): ICD-10-CM

## 2019-10-01 DIAGNOSIS — R32 URINARY INCONTINENCE: ICD-10-CM

## 2019-10-01 DIAGNOSIS — N39.0 UTI (URINARY TRACT INFECTION): Primary | ICD-10-CM

## 2019-10-01 DIAGNOSIS — N18.4 ACUTE RENAL FAILURE SUPERIMPOSED ON STAGE 4 CHRONIC KIDNEY DISEASE, UNSPECIFIED ACUTE RENAL FAILURE TYPE (HCC): ICD-10-CM

## 2019-10-01 DIAGNOSIS — Z94.0 STATUS POST KIDNEY TRANSPLANT: ICD-10-CM

## 2019-10-01 DIAGNOSIS — N17.9 ACUTE RENAL FAILURE SUPERIMPOSED ON STAGE 4 CHRONIC KIDNEY DISEASE, UNSPECIFIED ACUTE RENAL FAILURE TYPE (HCC): ICD-10-CM

## 2019-10-01 DIAGNOSIS — R53.83 FATIGUE: ICD-10-CM

## 2019-10-01 DIAGNOSIS — Z94.0 KIDNEY TRANSPLANT RECIPIENT: ICD-10-CM

## 2019-10-01 DIAGNOSIS — R35.0 URINARY FREQUENCY: ICD-10-CM

## 2019-10-01 DIAGNOSIS — D84.9 IMMUNOSUPPRESSION (HCC): ICD-10-CM

## 2019-10-01 PROBLEM — E87.2 METABOLIC ACIDOSIS: Status: ACTIVE | Noted: 2019-10-01

## 2019-10-01 LAB
ALBUMIN SERPL BCP-MCNC: 3 G/DL (ref 3.5–5)
ALP SERPL-CCNC: 119 U/L (ref 46–116)
ALT SERPL W P-5'-P-CCNC: 15 U/L (ref 12–78)
ANION GAP SERPL CALCULATED.3IONS-SCNC: 7 MMOL/L (ref 4–13)
AST SERPL W P-5'-P-CCNC: 8 U/L (ref 5–45)
ATRIAL RATE: 64 BPM
BACTERIA UR QL AUTO: ABNORMAL /HPF
BASOPHILS # BLD AUTO: 0.11 THOUSANDS/ΜL (ref 0–0.1)
BASOPHILS NFR BLD AUTO: 1 % (ref 0–1)
BILIRUB SERPL-MCNC: 0.35 MG/DL (ref 0.2–1)
BILIRUB UR QL STRIP: NEGATIVE
BUN SERPL-MCNC: 38 MG/DL (ref 5–25)
CALCIUM SERPL-MCNC: 9.2 MG/DL (ref 8.3–10.1)
CHLORIDE SERPL-SCNC: 118 MMOL/L (ref 100–108)
CLARITY UR: ABNORMAL
CO2 SERPL-SCNC: 15 MMOL/L (ref 21–32)
COLOR UR: YELLOW
CREAT SERPL-MCNC: 2.7 MG/DL (ref 0.6–1.3)
EOSINOPHIL # BLD AUTO: 0.24 THOUSAND/ΜL (ref 0–0.61)
EOSINOPHIL NFR BLD AUTO: 3 % (ref 0–6)
ERYTHROCYTE [DISTWIDTH] IN BLOOD BY AUTOMATED COUNT: 17.5 % (ref 11.6–15.1)
GFR SERPL CREATININE-BSD FRML MDRD: 19 ML/MIN/1.73SQ M
GLUCOSE SERPL-MCNC: 124 MG/DL (ref 65–140)
GLUCOSE SERPL-MCNC: 181 MG/DL (ref 65–140)
GLUCOSE SERPL-MCNC: 93 MG/DL (ref 65–140)
GLUCOSE UR STRIP-MCNC: NEGATIVE MG/DL
HCT VFR BLD AUTO: 27.6 % (ref 34.8–46.1)
HGB BLD-MCNC: 8.4 G/DL (ref 11.5–15.4)
HGB UR QL STRIP.AUTO: NEGATIVE
IMM GRANULOCYTES # BLD AUTO: 0.14 THOUSAND/UL (ref 0–0.2)
IMM GRANULOCYTES NFR BLD AUTO: 2 % (ref 0–2)
KETONES UR STRIP-MCNC: NEGATIVE MG/DL
LACTATE SERPL-SCNC: 0.5 MMOL/L (ref 0.5–2)
LEUKOCYTE ESTERASE UR QL STRIP: ABNORMAL
LYMPHOCYTES # BLD AUTO: 1.28 THOUSANDS/ΜL (ref 0.6–4.47)
LYMPHOCYTES NFR BLD AUTO: 15 % (ref 14–44)
MCH RBC QN AUTO: 29.6 PG (ref 26.8–34.3)
MCHC RBC AUTO-ENTMCNC: 30.4 G/DL (ref 31.4–37.4)
MCV RBC AUTO: 97 FL (ref 82–98)
MONOCYTES # BLD AUTO: 0.43 THOUSAND/ΜL (ref 0.17–1.22)
MONOCYTES NFR BLD AUTO: 5 % (ref 4–12)
NEUTROPHILS # BLD AUTO: 6.38 THOUSANDS/ΜL (ref 1.85–7.62)
NEUTS SEG NFR BLD AUTO: 74 % (ref 43–75)
NITRITE UR QL STRIP: POSITIVE
NON-SQ EPI CELLS URNS QL MICRO: ABNORMAL /HPF
NRBC BLD AUTO-RTO: 0 /100 WBCS
OTHER STN SPEC: ABNORMAL
P AXIS: 11 DEGREES
PH UR STRIP.AUTO: 8.5 [PH] (ref 4.5–8)
PLATELET # BLD AUTO: 168 THOUSANDS/UL (ref 149–390)
PMV BLD AUTO: 12.5 FL (ref 8.9–12.7)
POTASSIUM SERPL-SCNC: 4.6 MMOL/L (ref 3.5–5.3)
PR INTERVAL: 156 MS
PROT SERPL-MCNC: 8.1 G/DL (ref 6.4–8.2)
PROT UR STRIP-MCNC: ABNORMAL MG/DL
QRS AXIS: -38 DEGREES
QRSD INTERVAL: 84 MS
QT INTERVAL: 404 MS
QTC INTERVAL: 416 MS
RBC # BLD AUTO: 2.84 MILLION/UL (ref 3.81–5.12)
RBC #/AREA URNS AUTO: ABNORMAL /HPF
SODIUM SERPL-SCNC: 140 MMOL/L (ref 136–145)
SP GR UR STRIP.AUTO: 1.01 (ref 1–1.03)
T WAVE AXIS: 28 DEGREES
UROBILINOGEN UR QL STRIP.AUTO: 0.2 E.U./DL
VENTRICULAR RATE: 64 BPM
WBC # BLD AUTO: 8.58 THOUSAND/UL (ref 4.31–10.16)
WBC #/AREA URNS AUTO: ABNORMAL /HPF

## 2019-10-01 PROCEDURE — 96365 THER/PROPH/DIAG IV INF INIT: CPT

## 2019-10-01 PROCEDURE — 87186 SC STD MICRODIL/AGAR DIL: CPT

## 2019-10-01 PROCEDURE — 99223 1ST HOSP IP/OBS HIGH 75: CPT | Performed by: INTERNAL MEDICINE

## 2019-10-01 PROCEDURE — 93010 ELECTROCARDIOGRAM REPORT: CPT | Performed by: INTERNAL MEDICINE

## 2019-10-01 PROCEDURE — 87077 CULTURE AEROBIC IDENTIFY: CPT

## 2019-10-01 PROCEDURE — 80053 COMPREHEN METABOLIC PANEL: CPT | Performed by: EMERGENCY MEDICINE

## 2019-10-01 PROCEDURE — 83605 ASSAY OF LACTIC ACID: CPT | Performed by: EMERGENCY MEDICINE

## 2019-10-01 PROCEDURE — 99285 EMERGENCY DEPT VISIT HI MDM: CPT

## 2019-10-01 PROCEDURE — 87040 BLOOD CULTURE FOR BACTERIA: CPT | Performed by: EMERGENCY MEDICINE

## 2019-10-01 PROCEDURE — 93005 ELECTROCARDIOGRAM TRACING: CPT

## 2019-10-01 PROCEDURE — 76776 US EXAM K TRANSPL W/DOPPLER: CPT

## 2019-10-01 PROCEDURE — 96375 TX/PRO/DX INJ NEW DRUG ADDON: CPT

## 2019-10-01 PROCEDURE — 87086 URINE CULTURE/COLONY COUNT: CPT

## 2019-10-01 PROCEDURE — 90682 RIV4 VACC RECOMBINANT DNA IM: CPT | Performed by: PHYSICIAN ASSISTANT

## 2019-10-01 PROCEDURE — 85025 COMPLETE CBC W/AUTO DIFF WBC: CPT | Performed by: EMERGENCY MEDICINE

## 2019-10-01 PROCEDURE — 99285 EMERGENCY DEPT VISIT HI MDM: CPT | Performed by: EMERGENCY MEDICINE

## 2019-10-01 PROCEDURE — 81001 URINALYSIS AUTO W/SCOPE: CPT

## 2019-10-01 PROCEDURE — 36415 COLL VENOUS BLD VENIPUNCTURE: CPT | Performed by: EMERGENCY MEDICINE

## 2019-10-01 PROCEDURE — 82948 REAGENT STRIP/BLOOD GLUCOSE: CPT

## 2019-10-01 RX ORDER — TACROLIMUS 1 MG/1
3 CAPSULE ORAL DAILY
Status: DISCONTINUED | OUTPATIENT
Start: 2019-10-02 | End: 2019-10-01

## 2019-10-01 RX ORDER — TACROLIMUS 1 MG/1
2 CAPSULE ORAL EVERY EVENING
Status: DISCONTINUED | OUTPATIENT
Start: 2019-10-01 | End: 2019-10-03

## 2019-10-01 RX ORDER — PRAVASTATIN SODIUM 80 MG/1
80 TABLET ORAL
Status: DISCONTINUED | OUTPATIENT
Start: 2019-10-02 | End: 2019-10-04 | Stop reason: HOSPADM

## 2019-10-01 RX ORDER — TACROLIMUS 1 MG/1
3 CAPSULE ORAL EVERY MORNING
Status: DISCONTINUED | OUTPATIENT
Start: 2019-10-02 | End: 2019-10-04 | Stop reason: HOSPADM

## 2019-10-01 RX ORDER — BUSPIRONE HYDROCHLORIDE 5 MG/1
5 TABLET ORAL 2 TIMES DAILY
Status: DISCONTINUED | OUTPATIENT
Start: 2019-10-01 | End: 2019-10-04 | Stop reason: HOSPADM

## 2019-10-01 RX ORDER — FERROUS SULFATE 325(65) MG
325 TABLET ORAL
Status: DISCONTINUED | OUTPATIENT
Start: 2019-10-02 | End: 2019-10-04 | Stop reason: HOSPADM

## 2019-10-01 RX ORDER — AMLODIPINE BESYLATE 5 MG/1
5 TABLET ORAL EVERY EVENING
COMMUNITY
End: 2019-10-15 | Stop reason: SDUPTHER

## 2019-10-01 RX ORDER — AMLODIPINE BESYLATE 5 MG/1
5 TABLET ORAL EVERY EVENING
Status: DISCONTINUED | OUTPATIENT
Start: 2019-10-01 | End: 2019-10-02

## 2019-10-01 RX ORDER — AMLODIPINE BESYLATE 10 MG/1
10 TABLET ORAL EVERY MORNING
Status: DISCONTINUED | OUTPATIENT
Start: 2019-10-02 | End: 2019-10-02

## 2019-10-01 RX ORDER — SERTRALINE HYDROCHLORIDE 100 MG/1
200 TABLET, FILM COATED ORAL DAILY
Status: DISCONTINUED | OUTPATIENT
Start: 2019-10-02 | End: 2019-10-04 | Stop reason: HOSPADM

## 2019-10-01 RX ORDER — PREDNISONE 1 MG/1
5 TABLET ORAL DAILY
Status: DISCONTINUED | OUTPATIENT
Start: 2019-10-02 | End: 2019-10-04 | Stop reason: HOSPADM

## 2019-10-01 RX ORDER — LORAZEPAM 1 MG/1
2 TABLET ORAL 3 TIMES DAILY PRN
Status: DISCONTINUED | OUTPATIENT
Start: 2019-10-01 | End: 2019-10-04 | Stop reason: HOSPADM

## 2019-10-01 RX ORDER — FOLIC ACID 1 MG/1
1000 TABLET ORAL DAILY
Status: DISCONTINUED | OUTPATIENT
Start: 2019-10-02 | End: 2019-10-04 | Stop reason: HOSPADM

## 2019-10-01 RX ORDER — ACETAMINOPHEN 325 MG/1
650 TABLET ORAL EVERY 6 HOURS PRN
Status: DISCONTINUED | OUTPATIENT
Start: 2019-10-01 | End: 2019-10-04 | Stop reason: HOSPADM

## 2019-10-01 RX ORDER — CALCIUM CARBONATE 200(500)MG
1000 TABLET,CHEWABLE ORAL DAILY PRN
Status: DISCONTINUED | OUTPATIENT
Start: 2019-10-01 | End: 2019-10-04 | Stop reason: HOSPADM

## 2019-10-01 RX ORDER — SODIUM BICARBONATE 650 MG/1
1300 TABLET ORAL 3 TIMES DAILY
Status: DISCONTINUED | OUTPATIENT
Start: 2019-10-01 | End: 2019-10-01

## 2019-10-01 RX ORDER — ARIPIPRAZOLE 10 MG/1
30 TABLET ORAL
Status: DISCONTINUED | OUTPATIENT
Start: 2019-10-01 | End: 2019-10-04 | Stop reason: HOSPADM

## 2019-10-01 RX ORDER — INSULIN GLARGINE 100 [IU]/ML
1 INJECTION, SOLUTION SUBCUTANEOUS
Status: DISCONTINUED | OUTPATIENT
Start: 2019-10-01 | End: 2019-10-04 | Stop reason: HOSPADM

## 2019-10-01 RX ORDER — AMLODIPINE BESYLATE 10 MG/1
10 TABLET ORAL DAILY
Status: DISCONTINUED | OUTPATIENT
Start: 2019-10-02 | End: 2019-10-01

## 2019-10-01 RX ORDER — TACROLIMUS 1 MG/1
2 CAPSULE ORAL DAILY
Status: DISCONTINUED | OUTPATIENT
Start: 2019-10-01 | End: 2019-10-01

## 2019-10-01 RX ORDER — TACROLIMUS 1 MG/1
2 CAPSULE ORAL EVERY EVENING
COMMUNITY
End: 2019-10-15 | Stop reason: SDUPTHER

## 2019-10-01 RX ORDER — DULOXETIN HYDROCHLORIDE 60 MG/1
60 CAPSULE, DELAYED RELEASE ORAL 2 TIMES DAILY
Status: DISCONTINUED | OUTPATIENT
Start: 2019-10-01 | End: 2019-10-04 | Stop reason: HOSPADM

## 2019-10-01 RX ORDER — PREDNISONE 10 MG/1
5 TABLET ORAL DAILY
Status: DISCONTINUED | OUTPATIENT
Start: 2019-10-02 | End: 2019-10-01

## 2019-10-01 RX ORDER — DOXAZOSIN MESYLATE 4 MG/1
4 TABLET ORAL DAILY
Status: DISCONTINUED | OUTPATIENT
Start: 2019-10-01 | End: 2019-10-01

## 2019-10-01 RX ORDER — DOXAZOSIN MESYLATE 4 MG/1
4 TABLET ORAL
Status: DISCONTINUED | OUTPATIENT
Start: 2019-10-01 | End: 2019-10-02

## 2019-10-01 RX ORDER — METOPROLOL TARTRATE 50 MG/1
50 TABLET, FILM COATED ORAL EVERY 12 HOURS SCHEDULED
Status: DISCONTINUED | OUTPATIENT
Start: 2019-10-01 | End: 2019-10-02

## 2019-10-01 RX ORDER — ROPINIROLE 0.25 MG/1
0.25 TABLET, FILM COATED ORAL 2 TIMES DAILY
Status: DISCONTINUED | OUTPATIENT
Start: 2019-10-01 | End: 2019-10-04 | Stop reason: HOSPADM

## 2019-10-01 RX ORDER — ASPIRIN 81 MG/1
81 TABLET, CHEWABLE ORAL DAILY
Status: DISCONTINUED | OUTPATIENT
Start: 2019-10-02 | End: 2019-10-04 | Stop reason: HOSPADM

## 2019-10-01 RX ORDER — HYDRALAZINE HYDROCHLORIDE 50 MG/1
50 TABLET, FILM COATED ORAL EVERY 8 HOURS SCHEDULED
Status: DISCONTINUED | OUTPATIENT
Start: 2019-10-01 | End: 2019-10-01

## 2019-10-01 RX ORDER — CLONIDINE HYDROCHLORIDE 0.1 MG/1
0.3 TABLET ORAL EVERY 8 HOURS SCHEDULED
Status: DISCONTINUED | OUTPATIENT
Start: 2019-10-01 | End: 2019-10-02

## 2019-10-01 RX ORDER — SEVELAMER HYDROCHLORIDE 800 MG/1
800 TABLET, FILM COATED ORAL
Status: DISCONTINUED | OUTPATIENT
Start: 2019-10-01 | End: 2019-10-04 | Stop reason: HOSPADM

## 2019-10-01 RX ORDER — HYDRALAZINE HYDROCHLORIDE 50 MG/1
50 TABLET, FILM COATED ORAL EVERY 8 HOURS SCHEDULED
Status: DISCONTINUED | OUTPATIENT
Start: 2019-10-01 | End: 2019-10-02

## 2019-10-01 RX ORDER — POLYETHYLENE GLYCOL 3350 17 G/17G
17 POWDER, FOR SOLUTION ORAL DAILY PRN
Status: DISCONTINUED | OUTPATIENT
Start: 2019-10-01 | End: 2019-10-02

## 2019-10-01 RX ORDER — ONDANSETRON 2 MG/ML
4 INJECTION INTRAMUSCULAR; INTRAVENOUS EVERY 6 HOURS PRN
Status: DISCONTINUED | OUTPATIENT
Start: 2019-10-01 | End: 2019-10-04 | Stop reason: HOSPADM

## 2019-10-01 RX ORDER — HEPARIN SODIUM 5000 [USP'U]/ML
5000 INJECTION, SOLUTION INTRAVENOUS; SUBCUTANEOUS EVERY 8 HOURS SCHEDULED
Status: DISCONTINUED | OUTPATIENT
Start: 2019-10-01 | End: 2019-10-04 | Stop reason: HOSPADM

## 2019-10-01 RX ORDER — LEVOTHYROXINE SODIUM 0.12 MG/1
125 TABLET ORAL
Status: DISCONTINUED | OUTPATIENT
Start: 2019-10-02 | End: 2019-10-04 | Stop reason: HOSPADM

## 2019-10-01 RX ORDER — ONDANSETRON 2 MG/ML
4 INJECTION INTRAMUSCULAR; INTRAVENOUS ONCE
Status: COMPLETED | OUTPATIENT
Start: 2019-10-01 | End: 2019-10-01

## 2019-10-01 RX ADMIN — HYDRALAZINE HYDROCHLORIDE 50 MG: 50 TABLET ORAL at 21:37

## 2019-10-01 RX ADMIN — BUSPIRONE HYDROCHLORIDE 5 MG: 5 TABLET ORAL at 18:31

## 2019-10-01 RX ADMIN — DULOXETINE HYDROCHLORIDE 60 MG: 60 CAPSULE, DELAYED RELEASE ORAL at 18:31

## 2019-10-01 RX ADMIN — INSULIN GLARGINE 1 UNITS: 100 INJECTION, SOLUTION SUBCUTANEOUS at 21:36

## 2019-10-01 RX ADMIN — SODIUM BICARBONATE 70 ML/HR: 84 INJECTION, SOLUTION INTRAVENOUS at 16:50

## 2019-10-01 RX ADMIN — INFLUENZA A VIRUS A/BRISBANE/02/2018 (H1N1) RECOMBINANT HEMAGGLUTININ ANTIGEN, INFLUENZA A VIRUS A/KANSAS/14/2017 (H3N2) RECOMBINANT HEMAGGLUTININ ANTIGEN, INFLUENZA B VIRUS B/PHUKET/3073/2013 RECOMBINANT HEMAGGLUTININ ANTIGEN, AND INFLUENZA B VIRUS B/MARYLAND/15/2016 RECOMBINANT HEMAGGLUTININ ANTIGEN 0.5 ML: 45; 45; 45; 45 INJECTION INTRAMUSCULAR at 18:31

## 2019-10-01 RX ADMIN — SEVELAMER HYDROCHLORIDE 800 MG: 800 TABLET, FILM COATED PARENTERAL at 18:31

## 2019-10-01 RX ADMIN — CLONIDINE HYDROCHLORIDE 0.3 MG: 0.1 TABLET ORAL at 21:37

## 2019-10-01 RX ADMIN — INSULIN LISPRO 1 UNITS: 100 INJECTION, SOLUTION INTRAVENOUS; SUBCUTANEOUS at 21:36

## 2019-10-01 RX ADMIN — ONDANSETRON 4 MG: 2 INJECTION INTRAMUSCULAR; INTRAVENOUS at 14:14

## 2019-10-01 RX ADMIN — DOXAZOSIN 4 MG: 4 TABLET ORAL at 21:38

## 2019-10-01 RX ADMIN — ROPINIROLE HYDROCHLORIDE 0.25 MG: 0.25 TABLET, FILM COATED ORAL at 18:31

## 2019-10-01 RX ADMIN — TACROLIMUS 2 MG: 1 CAPSULE ORAL at 18:33

## 2019-10-01 RX ADMIN — METOPROLOL TARTRATE 50 MG: 50 TABLET, FILM COATED ORAL at 21:37

## 2019-10-01 RX ADMIN — CEFTRIAXONE SODIUM 1000 MG: 10 INJECTION, POWDER, FOR SOLUTION INTRAVENOUS at 14:18

## 2019-10-01 RX ADMIN — HEPARIN SODIUM 5000 UNITS: 5000 INJECTION INTRAVENOUS; SUBCUTANEOUS at 21:36

## 2019-10-01 RX ADMIN — AMLODIPINE BESYLATE 5 MG: 5 TABLET ORAL at 18:31

## 2019-10-01 RX ADMIN — ARIPIPRAZOLE 30 MG: 10 TABLET ORAL at 21:37

## 2019-10-01 NOTE — ASSESSMENT & PLAN NOTE
· Continue amlodipine 10 mg every morning, 5 mg every evening    Clonidine 0 3 mg TID, hydralazine 50 mg TID, metoprolol 50 mg q12hr , doxazosin 4 mg daily

## 2019-10-01 NOTE — ASSESSMENT & PLAN NOTE
· Nephrology following, appreciate input  She is s/p kidney and pancreas transplant in 1998  · EDUARDO on CKD stage 4  Previous baseline Cr 1 9-2 1  Follows with Dr Wilberto Earl outpatient  · Was recently admitted 9/13/19 with EDUARDO at that time, peak Cr that admission 4 4  Improved to 2 8 on discharge  · Today Cr 2 7 - slowly improving from previous admission  Lasix continues to be held  · IV bicarb drip given metabolic acidosis   · Avoid hypotension, nephrotoxins   · Transplant kidney US: Arterial resistive indices within transplant lower pole renal parenchyma and transplant renal artery measuring between 0 8 and 0 9, previously resistive indices measuring up to 0 8 in early September  Transplant renal artery dose not suggest stenosis    Findings are more concerning for developing transplant rejection

## 2019-10-01 NOTE — TELEPHONE ENCOUNTER
Patient called complaining on feeling very lethargic and having severe urinary incontinence  Per Dieter please have patient report the to the ER  Spoke with the patient and she is aware and she will be reporting to [facility] Emergency room  The patient has no further questions at this time        Savana Lopez MA

## 2019-10-01 NOTE — ED PROVIDER NOTES
History  Chief Complaint   Patient presents with    Urinary Frequency     for past 2 days pt has had urinary frequency with burning has not noticed blood  tried  recent admit for UTI d/c on 9/16  nausea  giovanni fever and chills      HPI    48yo female pmhx CKD4 s/p renal transplant on immunosuppressants, multiple myeloma, hypertension, atrial fibrillation, diastolic CHF, DM type 1, anemia presents for evaluation of urinary incontinence and dysuria  Patient says she normally becomes incontinent of urine when she has a urine infection  Patient says she wears depends when this occurs  Patient notes she has had symptoms along with lower abdominal/suprapubic pain and urinary frequency for the last 2 days  Patient says she was recently admitted a month ago for a UTI and EDUARDO on CKD  Per nephrologist who patient sees at clinic, patient's mom called clinic and had reported that in addition to symptoms patient has also been fatigued, sleeping >20hrs day and confused  Patient denies recent fever, chills, chest pain, shortness of breath, vomiting, abdominal pain, diarrhea, constipation, hematuria, or back pain  Prior to Admission Medications   Prescriptions Last Dose Informant Patient Reported? Taking?    ARIPiprazole (ABILIFY) 30 mg tablet 9/30/2019 at Unknown time Self No Yes   Sig: Take 1 tablet (30 mg total) by mouth daily at bedtime for 180 days   Cholecalciferol (VITAMIN D3) 1000 units CAPS 10/1/2019 at Unknown time Self Yes Yes   Sig: Take 3 capsules by mouth daily     DULoxetine (CYMBALTA) 60 mg delayed release capsule 10/1/2019 at Unknown time Self No Yes   Sig: Take 1 capsule (60 mg total) by mouth 2 (two) times a day for 180 days   Insulin Glargine (TOUJEO SOLOSTAR) 300 units/mL CONCETRATED U-300 injection pen 9/30/2019 at Unknown time Self No Yes   Sig: Inject 1 Units under the skin daily at bedtime   Insulin Pen Needle (BD PEN NEEDLE VISHNU U/F) 32G X 4 MM MISC 10/1/2019 at Unknown time Self No Yes   Sig: Use 4 times daily   LORazepam (ATIVAN) 2 mg tablet Past Month at Unknown time Self No Yes   Sig: Take 1 tablet (2 mg total) by mouth 3 (three) times a day as needed for anxiety for up to 120 days To be filled on or after 6/29/19   Lancets (ONETOUCH ULTRASOFT) lancets 10/1/2019 at Unknown time Self Yes Yes   Sig: by Does not apply route 4 (four) times a day     ONE TOUCH ULTRA TEST test strip 10/1/2019 at Unknown time Self No Yes   Sig: Use 4 times daily ( please dispense One touch Ultra test strips)   amLODIPine (NORVASC) 10 mg tablet 10/1/2019 at Unknown time Self No Yes   Sig: TAKE 1 TABLET(10MG) BY MOUTH EVERY MORNING AND 1/2 TABLET(5MG) EVERY EVENING   Patient taking differently: Take 10 mg by mouth every morning TAKE 1 TABLET(10MG) BY MOUTH EVERY MORNING AND 1/2 TABLET(5MG) EVERY EVENING   amLODIPine (NORVASC) 5 mg tablet   Yes Yes   Sig: Take 5 mg by mouth every evening   aspirin 81 MG tablet 10/1/2019 at Unknown time Self Yes Yes   Sig: Take 1 tablet by mouth daily   busPIRone (BUSPAR) 5 mg tablet 10/1/2019 at Unknown time Self No Yes   Sig: Take 1 tablet (5 mg total) by mouth 2 (two) times a day for 180 days   cloNIDine (CATAPRES) 0 1 mg tablet 10/1/2019 at Unknown time Self No Yes   Sig: TAKE 3 TABLETS BY MOUTH EVERY MORNING, 3 TABLETS AT NOON, AND 3 TABLETS EVERY EVENING   Patient taking differently: 0 3 mg every 8 (eight) hours TAKE 3 TABLETS BY MOUTH EVERY MORNING, 3 TABLETS AT NOON, AND 3 TABLETS EVERY EVENING   doxazosin (CARDURA) 4 mg tablet 9/30/2019 at Unknown time  No Yes   Sig: Take 1 tablet (4 mg total) by mouth daily at bedtime   ferrous sulfate 325 (65 Fe) mg tablet 9/30/2019 at Unknown time  No Yes   Sig: Take 1 tablet (325 mg total) by mouth daily with breakfast   folic acid (FOLVITE) 1 mg tablet 10/1/2019 at Unknown time Self No Yes   Sig: TAKE 1 TABLET BY MOUTH DAILY AS DIRECTED   hydrALAZINE (APRESOLINE) 50 mg tablet 10/1/2019 at Unknown time Self No Yes   Sig: TAKE 1 TABLET(50MG) BY MOUTH THREE TIMES DAILY FOR 90 DAYS   insulin lispro (HUMALOG KWIKPEN) 100 units/mL injection pen 10/1/2019 at Unknown time Self No Yes   Sig: INJECT 10 UNDER THE SKIN EVERY DAY OR AS DIRECTED   Patient taking differently: INJECT 10 UNDER THE SKIN EVERY DAY OR AS DIRECTED   levothyroxine 125 mcg tablet 10/1/2019 at Unknown time Self No Yes   Sig: Take 1 tablet (125 mcg total) by mouth daily   metoprolol tartrate (LOPRESSOR) 50 mg tablet 10/1/2019 at Unknown time Self No Yes   Sig: TAKE 1 TABLET(50MG TOTAL) BY MOUTH TWICE DAILY   polyethylene glycol (MIRALAX) 17 g packet 10/1/2019 at Unknown time  No Yes   Sig: Take 17 g by mouth daily   pravastatin (PRAVACHOL) 80 mg tablet 10/1/2019 at Unknown time Self No Yes   Sig: TAKE 1 TABLET BY MOUTH DAILY   pravastatin (PRAVACHOL) 80 mg tablet 10/1/2019 at Unknown time  No Yes   Sig: TAKE 1 TABLET BY MOUTH EVERY DAY   predniSONE 5 mg tablet 10/1/2019 at Unknown time Self No Yes   Sig: Take 1 tablet (5 mg total) by mouth daily   rOPINIRole (REQUIP) 0 25 mg tablet 10/1/2019 at Unknown time Self No Yes   Sig: TAKE 1 TABLET BY MOUTH TWICE DAILY   sertraline (ZOLOFT) 100 mg tablet 10/1/2019 at Unknown time Self No Yes   Sig: Take 2 tablets (200 mg total) by mouth daily for 180 days   sevelamer carbonate (RENVELA) 800 mg tablet 10/1/2019 at Unknown time Self No Yes   Sig: Take 1 tablet (800 mg total) by mouth 3 (three) times a day with meals   sodium bicarbonate 650 mg tablet 10/1/2019 at Unknown time Self No Yes   Sig: TAKE 2 TABLETS BY MOUTH THREE TIMES DAILY   tacrolimus (PROGRAF) 1 mg capsule 10/1/2019 at Unknown time Self No Yes   Sig: Take 3 mg in AM and 2 mg in PM (6/18/19)   Patient taking differently: Take 3 mg by mouth every morning Take 3 mg in AM and 2 mg in PM (6/18/19)   tacrolimus (PROGRAF) 1 mg capsule   Yes Yes   Sig: Take 2 mg by mouth every evening      Facility-Administered Medications: None       Past Medical History:   Diagnosis Date    Abnormal liver function test  Acute kidney injury ([facility] [address] )     Acute on chronic congestive heart failure (HCC)     Allergic urticaria     Anemia     Cancer (HCC)     Multiple myeloma    Cervical dysplasia     Cholelithiasis     Chronic diastolic (congestive) heart failure ([facility] [address] ) 9/18/2017    Diabetes mellitus ([facility] [address] )     Previous, controlled with diet    Diabetes mellitus with foot ulcer ([facility] [address] )     Disease of thyroid gland     Encephalopathy     Hematuria     + leak est- secondary to UTIs/panc drainage    History of transfusion     Hyperkalemia     Hypertension     Iliotibial band syndrome     Lumbar radiculopathy     Multiple myeloma (HCC)     Multiple myeloma ([facility] [address] [facility]     Night blindness     Nonrheumatic aortic (valve) insufficiency     Pneumonia     Renal disorder     Retinopathy     Seborrhea     Seizure ([facility] [address] [facility]     Shingles     Sinus tachycardia     B blocker - cardio echo stress test 02 normal/neg LE doppler 2/02 OK and 12/07    Status post simultaneous kidney and pancreas transplant ([facility] [address] )     Toe amputation status     Trochanteric bursitis        Past Surgical History:   Procedure Laterality Date    CATARACT EXTRACTION      CHOLECYSTECTOMY      COLONOSCOPY      two polyps in the rectum removed and biopsied diverticulosis in the sigmoid colon, external hemorrhoiods- Dr Barfield    COMBINED KIDNEY-PANCREAS TRANSPLANT N/A     CT BONE MARROW BIOPSY AND ASPIRATION  4/17/2019    CYSTOSCOPY N/A 10/13/2016    Procedure: CYSTOSCOPY, retrograde pyelogram, biopsy of ureteral polyp; Surgeon: Roxi Bhatia MD;  Location: [facility];  Service:    [facility] DILATION AND CURETTAGE OF UTERUS      ESOPHAGOGASTRODUODENOSCOPY N/A 11/20/2017    Procedure: ESOPHAGOGASTRODUODENOSCOPY (EGD); Surgeon: Vicki Shelton DO;  Location: [facility];   Service: Gastroenterology    EYE SURGERY      cataracts    FOOT AMPUTATION THROUGH METATARSAL Left     FOOT SURGERY Right     excision of metatarsal heads    HALLUX VALGUS CORRECTION Right     NEPHRECTOMY TRANSPLANTED ORGAN      PANCREATIC TRANSPLANT REMOVAL  1998       Family History   Problem Relation Age of Onset    Hypertension Mother     Cancer Mother     Hypertension Father     Cancer Father     Cancer Maternal Grandfather     Cancer Paternal Grandmother     Cancer Paternal Grandfather     Depression Sister     Breast cancer Maternal Grandmother 77     I have reviewed and agree with the history as documented  Social History     Tobacco Use    Smoking status: Former Smoker     Last attempt to quit: 2012     Years since quittin 4    Smokeless tobacco: Never Used   Substance Use Topics    Alcohol use: Never     Frequency: Never     Binge frequency: Never     Comment: (history)    Drug use: Never     Types: Hydrocodone     Comment: Past cocaine use many years ago - no current use        Review of Systems   Constitutional: Positive for fatigue  Negative for chills, diaphoresis and fever  HENT: Negative for congestion, rhinorrhea and sore throat  Eyes: Negative for photophobia and visual disturbance  Respiratory: Negative for cough, chest tightness and shortness of breath  Cardiovascular: Negative for chest pain and palpitations  Gastrointestinal: Positive for abdominal pain and nausea  Negative for blood in stool, constipation, diarrhea and vomiting  Genitourinary: Positive for dysuria, frequency and urgency  Negative for decreased urine volume and hematuria  Musculoskeletal: Negative for back pain, gait problem, myalgias, neck pain and neck stiffness  Skin: Negative for pallor and rash  Neurological: Positive for weakness  Negative for syncope, light-headedness, numbness and headaches  Hematological: Negative for adenopathy  Does not bruise/bleed easily  All other systems reviewed and are negative        Physical Exam  ED Triage Vitals   Temperature Pulse Respirations Blood Pressure SpO2   10/01/19 1208 10/01/19 1208 10/01/19 1208 10/01/19 1208 10/01/19 1208   99 °F (37 2 °C) 66 20 (!) 173/72 97 %      Temp Source Heart Rate Source Patient Position - Orthostatic VS BP Location FiO2 (%)   10/01/19 1208 10/01/19 1305 10/01/19 1419 10/01/19 1305 --   Oral Monitor Sitting Right arm       Pain Score       10/01/19 1208       7             Orthostatic Vital Signs  Vitals:    10/01/19 1419 10/01/19 1600 10/01/19 1615 10/01/19 1714   BP: (!) 211/81 (!) 198/75 (!) 182/75 170/68   Pulse: 65 68 80 65   Patient Position - Orthostatic VS: Sitting Sitting Sitting        Physical Exam   Constitutional: She is oriented to person, place, and time  No distress  Patient is alert and oriented, appears chronically ill, in no acute distress   HENT:   Head: Normocephalic and atraumatic  Mouth/Throat: Oropharynx is clear and moist  No oropharyngeal exudate  Eyes: Pupils are equal, round, and reactive to light  Conjunctivae and EOM are normal    Neck: Normal range of motion  Neck supple  Cardiovascular: Normal rate, regular rhythm, normal heart sounds and intact distal pulses  Pulmonary/Chest: Effort normal and breath sounds normal  No respiratory distress  Abdominal: Soft  Bowel sounds are normal  She exhibits no distension  There is tenderness  Diffuse tenderness on palpation   Musculoskeletal: Normal range of motion  She exhibits no edema  Lymphadenopathy:     She has no cervical adenopathy  Neurological: She is alert and oriented to person, place, and time  No cranial nerve deficit  She exhibits normal muscle tone  Skin: Skin is warm and dry  Capillary refill takes less than 2 seconds  No rash noted  She is not diaphoretic  No erythema  There is pallor  Psychiatric: She has a normal mood and affect  Her behavior is normal  Judgment and thought content normal    Nursing note and vitals reviewed        ED Medications  Medications   sodium bicarbonate 150 mEq in dextrose 5 % 1,000 mL infusion (70 mL/hr Intravenous New Bag 10/1/19 1650)   cloNIDine (CATAPRES) tablet 0 3 mg (has no administration in time range)   amLODIPine (NORVASC) tablet 10 mg (has no administration in time range)   amLODIPine (NORVASC) tablet 5 mg (5 mg Oral Given 10/1/19 1831)   ARIPiprazole (ABILIFY) tablet 30 mg (has no administration in time range)   aspirin chewable tablet 81 mg (has no administration in time range)   busPIRone (BUSPAR) tablet 5 mg (5 mg Oral Given 10/1/19 1831)   doxazosin (CARDURA) tablet 4 mg (has no administration in time range)   DULoxetine (CYMBALTA) delayed release capsule 60 mg (60 mg Oral Given 10/1/19 1831)   ferrous sulfate tablet 325 mg (has no administration in time range)   folic acid (FOLVITE) tablet 1,000 mcg (has no administration in time range)   hydrALAZINE (APRESOLINE) tablet 50 mg (has no administration in time range)   insulin glargine (LANTUS) subcutaneous injection 1 Units 0 01 mL (has no administration in time range)   levothyroxine tablet 125 mcg (has no administration in time range)   LORazepam (ATIVAN) tablet 2 mg (has no administration in time range)   metoprolol tartrate (LOPRESSOR) tablet 50 mg (has no administration in time range)   pravastatin (PRAVACHOL) tablet 80 mg (has no administration in time range)   predniSONE tablet 5 mg (has no administration in time range)   rOPINIRole (REQUIP) tablet 0 25 mg (0 25 mg Oral Given 10/1/19 1831)   sertraline (ZOLOFT) tablet 200 mg (has no administration in time range)   sevelamer (RENAGEL) tablet 800 mg (800 mg Oral Given 10/1/19 1831)   tacrolimus (PROGRAF) capsule 3 mg (has no administration in time range)   tacrolimus (PROGRAF) capsule 2 mg (2 mg Oral Given 10/1/19 1833)   polyethylene glycol (MIRALAX) packet 17 g (has no administration in time range)   ondansetron (ZOFRAN) injection 4 mg (has no administration in time range)   calcium carbonate (TUMS) chewable tablet 1,000 mg (has no administration in time range)   heparin (porcine) subcutaneous injection 5,000 Units (has no administration in time range) acetaminophen (TYLENOL) tablet 650 mg (has no administration in time range)   cefTRIAXone (ROCEPHIN) 1,000 mg in dextrose 5 % 50 mL IVPB (has no administration in time range)   insulin lispro (HumaLOG) 100 units/mL subcutaneous injection 1-6 Units (1 Units Subcutaneous Not Given 10/1/19 1830)   insulin lispro (HumaLOG) 100 units/mL subcutaneous injection 1-5 Units (has no administration in time range)   ceftriaxone (ROCEPHIN) 1 g/50 mL in dextrose IVPB (0 mg Intravenous Stopped 10/1/19 1450)   ondansetron (ZOFRAN) injection 4 mg (4 mg Intravenous Given 10/1/19 1414)   influenza vaccine, recombinant, quadrivalent (FLUBLOK) IM injection 0 5 mL (0 5 mL Intramuscular Given 10/1/19 1831)       Diagnostic Studies  Results Reviewed     Procedure Component Value Units Date/Time    Urine Microscopic [[id number]]  (Abnormal) Collected:  10/01/19 1253    Lab Status:  Final result Specimen:  Urine, Clean Catch Updated:  10/01/19 1430     RBC, UA None Seen /hpf      WBC, UA 30-50 /hpf      Epithelial Cells None Seen /hpf      Bacteria, UA Innumerable /hpf      OTHER OBSERVATIONS WBCs Clumped    Urine culture [[id number]] Collected:  10/01/19 1253    Lab Status: In process Specimen:  Urine, Clean Catch Updated:  10/01/19 1425    Blood culture #2 [[id number]] Collected:  10/01/19 1416    Lab Status: In process Specimen:  Blood from Line, Venous Updated:  10/01/19 1422    POCT urinalysis dipstick [[id number]]  (Abnormal) Resulted:  10/01/19 1253    Lab Status:  Final result Specimen:  Urine Updated:  10/01/19 1409    Lactic acid, plasma [[id number]]  (Normal) Collected:  10/01/19 1222    Lab Status:  Final result Specimen:  Blood from Arm, Left Updated:  10/01/19 1325     LACTIC ACID 0 5 mmol/L     Narrative:       Result may be elevated if tourniquet was used during collection      Comprehensive metabolic panel [[id number]]  (Abnormal) Collected:  10/01/19 1222    Lab Status:  Final result Specimen:  Blood from Arm, Left Updated: 10/01/19 1321     Sodium 140 mmol/L      Potassium 4 6 mmol/L      Chloride 118 mmol/L      CO2 15 mmol/L      ANION GAP 7 mmol/L      BUN 38 mg/dL      Creatinine 2 70 mg/dL      Glucose 93 mg/dL      Calcium 9 2 mg/dL      AST 8 U/L      ALT 15 U/L      Alkaline Phosphatase 119 U/L      Total Protein 8 1 g/dL      Albumin 3 0 g/dL      Total Bilirubin 0 35 mg/dL      eGFR 19 ml/min/1 73sq m     Narrative:       [place] guidelines for Chronic Kidney Disease (CKD):     Stage 1 with normal or high GFR (GFR > 90 mL/min/1 73 square meters)    Stage 2 Mild CKD (GFR = 60-89 mL/min/1 73 square meters)    Stage 3A Moderate CKD (GFR = 45-59 mL/min/1 73 square meters)    Stage 3B Moderate CKD (GFR = 30-44 mL/min/1 73 square meters)    Stage 4 Severe CKD (GFR = 15-29 mL/min/1 73 square meters)    Stage 5 End Stage CKD (GFR <15 mL/min/1 73 square meters)  Note: GFR calculation is accurate only with a steady state creatinine    Blood culture #1 [[id number]] Collected:  10/01/19 1308    Lab Status:   In process Specimen:  Blood from Line, Venous Updated:  10/01/19 1317    CBC and differential [[id number]]  (Abnormal) Collected:  10/01/19 1222    Lab Status:  Final result Specimen:  Blood from Arm, Left Updated:  10/01/19 1305     WBC 8 58 Thousand/uL      RBC 2 84 Million/uL      Hemoglobin 8 4 g/dL      Hematocrit 27 6 %      MCV 97 fL      MCH 29 6 pg      MCHC 30 4 g/dL      RDW 17 5 %      MPV 12 5 fL      Platelets 037 Thousands/uL      nRBC 0 /100 WBCs      Neutrophils Relative 74 %      Immat GRANS % 2 %      Lymphocytes Relative 15 %      Monocytes Relative 5 %      Eosinophils Relative 3 %      Basophils Relative 1 %      Neutrophils Absolute 6 38 Thousands/µL      Immature Grans Absolute 0 14 Thousand/uL      Lymphocytes Absolute 1 28 Thousands/µL      Monocytes Absolute 0 43 Thousand/µL      Eosinophils Absolute 0 24 Thousand/µL      Basophils Absolute 0 11 Thousands/µL     ED Urine Macroscopic [[id number]]  (Abnormal) Collected:  10/01/19 1253    Lab Status:  Final result Specimen:  Urine Updated:  10/01/19 1251     Color, UA Yellow     Clarity, UA Cloudy     pH, UA 8 5     Leukocytes, UA Large     Nitrite, UA Positive     Protein,  (2+) mg/dl      Glucose, UA Negative mg/dl      Ketones, UA Negative mg/dl      Urobilinogen, UA 0 2 E U /dl      Bilirubin, UA Negative     Blood, UA Negative     Specific Gravity, UA 1 015    Narrative:       [facility] transplant kidney with doppler   Final Result by Elbert Watson DO (10/01 1426)   Arterial resistive indices within the transplant lower pole renal parenchyma and transplant renal artery measuring between 0 8 and 0 9, previously resistive indices measuring up to 0 8 in early September  Transplant renal artery and transplant renal vein are patent  Spectral analysis of the transplant renal artery does not suggest stenosis  Findings are more concerning for developing transplant rejection  No hydronephrosis  Simple renal cysts within both the left pelvic transplant kidney and right native kidney  Left native kidney not reliably identified,  known to be significantly atrophic  The study was marked in [facility] for immediate notification  Workstation performed: [id number]               Procedures  Procedures        ED Course  ED Course as of Oct 01 1922   Tue Oct 01, 2019   1435 US bladder and kidney: Arterial resistive indices within the transplant lower pole renal parenchyma and transplant renal artery measuring between 0 8 and 0 9, previously resistive indices measuring up to 0 8 in early September  Transplant renal artery and transplant renal vein are patent  Spectral analysis of the transplant renal artery does not suggest stenosis  Findings are more concerning for developing transplant rejection  No hydronephrosis    Simple renal cysts within both the left pelvic transplant kidney and right native kidney  Left native kidney not reliably identified,  known to be significantly atrophic  MDM  Number of Diagnoses or Management Options  Fatigue:   Kidney transplant recipient:   Urinary frequency:   Urinary incontinence:   UTI (urinary tract infection):   Diagnosis management comments: 62-year-old female prior renal transplant recipient with CKD 4 on immunosuppressants presents for evaluation of urinary incontinence, frequency, and dysuria  Patient appears chronically ill and is hypertensive but is otherwise hemodynamically stable and in no acute distress  Will obtain a CBC, CMP, and urinalysis  Per Nephrology's recommendations (Dr Kane Briones) , will also obtain blood cultures, lactic acid, and ultrasound of kidney and bladder  They also recommended giving dose of ceftriaxone in the ED  Disposition  Final diagnoses:   UTI (urinary tract infection)   Kidney transplant recipient   Urinary frequency   Urinary incontinence   Fatigue     Time reflects when diagnosis was documented in both MDM as applicable and the Disposition within this note     Time User Action Codes Description Comment    10/1/2019  3:15 PM Carolann Hashimoto Add [N39 0] UTI (urinary tract infection)     10/1/2019  3:18 PM Carolann Hashimoto Add [Z94 0] Kidney transplant recipient     10/1/2019  3:18 PM Carolann Hashimoto Add [R35 0] Urinary frequency     10/1/2019  3:18 PM Carolann Hashimoto Add [R32] Urinary incontinence     10/1/2019  3:18 PM Carolann Hashimoto Add [R53 [id number]] Fatigue     10/1/2019  3:19 PM Celia BERNARD Add [N18 4] Chronic kidney disease, stage 4 (severe) ([facility] [address] )     10/1/2019  5:29 PM Phil Green Add [D89 9] Immunosuppression [facility])       ED Disposition     ED Disposition Condition Date/Time Comment    Admit Stable Tue Oct 1, 2019  3:06 PM Case was discussed with    and the patient's admission status was agreed to be Admission Status: inpatient status to the service of      Follow-up Information    None         Current Discharge Medication List      CONTINUE these medications which have NOT CHANGED    Details   !! amLODIPine (NORVASC) 10 mg tablet TAKE 1 TABLET(10MG) BY MOUTH EVERY MORNING AND 1/2 TABLET(5MG) EVERY EVENING  Qty: 135 tablet, Refills: 0    Comments: **Patient requests 90 days supply**  Associated Diagnoses: Benign essential HTN      !! amLODIPine (NORVASC) 5 mg tablet Take 5 mg by mouth every evening      ARIPiprazole (ABILIFY) 30 mg tablet Take 1 tablet (30 mg total) by mouth daily at bedtime for 180 days  Qty: 90 tablet, Refills: 1    Associated Diagnoses: Major depressive disorder, recurrent episode, in full remission (HCC)      aspirin 81 MG tablet Take 1 tablet by mouth daily      busPIRone (BUSPAR) 5 mg tablet Take 1 tablet (5 mg total) by mouth 2 (two) times a day for 180 days  Qty: 180 tablet, Refills: 1    Associated Diagnoses: FELIPE (generalized anxiety disorder)      Cholecalciferol (VITAMIN D3) 1000 units CAPS Take 3 capsules by mouth daily        cloNIDine (CATAPRES) 0 1 mg tablet TAKE 3 TABLETS BY MOUTH EVERY MORNING, 3 TABLETS AT NOON, AND 3 TABLETS EVERY EVENING  Qty: 810 tablet, Refills: 4    Comments: **Patient requests 90 days supply**  Associated Diagnoses: Benign essential HTN      doxazosin (CARDURA) 4 mg tablet Take 1 tablet (4 mg total) by mouth daily at bedtime  Qty: 30 tablet, Refills: 0    Associated Diagnoses: Essential hypertension      DULoxetine (CYMBALTA) 60 mg delayed release capsule Take 1 capsule (60 mg total) by mouth 2 (two) times a day for 180 days  Qty: 180 capsule, Refills: 1    Associated Diagnoses: FELIPE (generalized anxiety disorder);  Major depressive disorder, recurrent episode, in full remission (HCC)      ferrous sulfate 325 (65 Fe) mg tablet Take 1 tablet (325 mg total) by mouth daily with breakfast  Qty: 30 tablet, Refills: 0    Associated Diagnoses: Chronic kidney disease, stage 4 (severe) (HCC)      folic acid (FOLVITE) 1 mg tablet TAKE 1 TABLET BY MOUTH DAILY AS DIRECTED  Qty: 90 tablet, Refills: 3    Associated Diagnoses: Mixed hyperlipidemia      hydrALAZINE (APRESOLINE) 50 mg tablet TAKE 1 TABLET(50MG) BY MOUTH THREE TIMES DAILY FOR 90 DAYS  Qty: 270 tablet, Refills: 0    Associated Diagnoses: Essential hypertension      Insulin Glargine (TOUJEO SOLOSTAR) 300 units/mL CONCETRATED U-300 injection pen Inject 1 Units under the skin daily at bedtime  Qty: 4 pen, Refills: 0    Associated Diagnoses: Uncontrolled type 2 diabetes mellitus with hyperglycemia (HCC)      insulin lispro (HUMALOG KWIKPEN) 100 units/mL injection pen INJECT 10 UNDER THE SKIN EVERY DAY OR AS DIRECTED  Qty: 45 mL, Refills: 1    Comments: **Patient requests 90 days supply**  Associated Diagnoses: Type 1 diabetes mellitus with hyperglycemia (HCC)      Insulin Pen Needle (BD PEN NEEDLE VISHNU U/F) 32G X 4 MM MISC Use 4 times daily  Qty: 400 each, Refills: 1    Associated Diagnoses: Type 2 diabetes mellitus with hyperglycemia, with long-term current use of insulin (HCC)      Lancets (ONETOUCH ULTRASOFT) lancets by Does not apply route 4 (four) times a day        levothyroxine 125 mcg tablet Take 1 tablet (125 mcg total) by mouth daily  Qty: 90 tablet, Refills: 2    Comments: **Patient requests 90 days supply**  Associated Diagnoses: Type 1 diabetes mellitus with diabetic polyneuropathy ([facility] [address] );  Acquired hypothyroidism      LORazepam (ATIVAN) 2 mg tablet Take 1 tablet (2 mg total) by mouth 3 (three) times a day as needed for anxiety for up to 120 days To be filled on or after 6/29/19  Qty: 90 tablet, Refills: 3    Associated Diagnoses: FELIPE (generalized anxiety disorder)      metoprolol tartrate (LOPRESSOR) 50 mg tablet TAKE 1 TABLET(50MG TOTAL) BY MOUTH TWICE DAILY  Qty: 180 tablet, Refills: 5    Comments: **Patient requests 90 days supply**  Associated Diagnoses: Benign essential HTN      ONE TOUCH ULTRA TEST test strip Use 4 times daily ( please dispense One touch Ultra test strips)  Qty: 400 each, Refills: 1    Comments: May break down into 30 day increment at patient's request   Associated Diagnoses: Type 1 diabetes mellitus with complication (HCC)      polyethylene glycol (MIRALAX) 17 g packet Take 17 g by mouth daily  Qty: 14 each, Refills: 0    Associated Diagnoses: Constipation      !! pravastatin (PRAVACHOL) 80 mg tablet TAKE 1 TABLET BY MOUTH DAILY  Qty: 90 tablet, Refills: 5    Comments: **Patient requests 90 days supply**  Associated Diagnoses: Mixed hyperlipidemia      ! ! pravastatin (PRAVACHOL) 80 mg tablet TAKE 1 TABLET BY MOUTH EVERY DAY  Qty: 90 tablet, Refills: 3    Associated Diagnoses: Mixed hyperlipidemia      predniSONE 5 mg tablet Take 1 tablet (5 mg total) by mouth daily  Qty: 90 tablet, Refills: 3    Associated Diagnoses: Benign essential HTN      rOPINIRole (REQUIP) 0 25 mg tablet TAKE 1 TABLET BY MOUTH TWICE DAILY  Qty: 180 tablet, Refills: 0    Associated Diagnoses: RLS (restless legs syndrome)      sertraline (ZOLOFT) 100 mg tablet Take 2 tablets (200 mg total) by mouth daily for 180 days  Qty: 180 tablet, Refills: 1    Associated Diagnoses: FELIPE (generalized anxiety disorder); Major depressive disorder, recurrent episode, in full remission (HCC)      sevelamer carbonate (RENVELA) 800 mg tablet Take 1 tablet (800 mg total) by mouth 3 (three) times a day with meals  Qty: 270 tablet, Refills: 3    Associated Diagnoses: Hyperphosphatemia      sodium bicarbonate 650 mg tablet TAKE 2 TABLETS BY MOUTH THREE TIMES DAILY  Qty: 180 tablet, Refills: 0    Associated Diagnoses: Acidemia      !! tacrolimus (PROGRAF) 1 mg capsule Take 3 mg in AM and 2 mg in PM (6/18/19)  Qty: 180 capsule, Refills: 6    Associated Diagnoses: Renal transplant recipient      !! tacrolimus (PROGRAF) 1 mg capsule Take 2 mg by mouth every evening       !! - Potential duplicate medications found  Please discuss with provider  No discharge procedures on file      ED Provider  Attending physically available and evaluated Eric Orosco I managed the patient along with the ED Attending      Electronically Signed by         Kacie Rapp MD  10/01/19 Andrei Osman

## 2019-10-01 NOTE — ASSESSMENT & PLAN NOTE
· Kidney and pancreas transplant in 1998  · Transplant kidney US: Arterial resistive indices within transplant lower pole renal parenchyma and transplant renal artery measuring between 0 8 and 0 9, previously resistive indices measuring up to 0 8 in early September  Transplant renal artery dose not suggest stenosis    Findings are more concerning for developing transplant rejection   · Nephrology following, appreciate input  · Continue prednisone and Prograf - level pending for AM

## 2019-10-01 NOTE — CONSULTS
Consultation - Nephrology   Maryann Deleon 53 y o  female MRN: [id number]  Unit/Bed#: ED 09 Encounter: [id number]    ASSESSMENT and PLAN:  1  Acute kidney injury (POA):  Suspect prerenal from decreased oral intake, possible infection with concerns for recurrent UTI in the setting of recent AK I with peak creatinine of 4 22, versus ongoing improvement in renal function since discharge off Revlimid as previously recommended   -after review medical records baseline creatinine 1 8-2 1 and follows Dr Jordana Sharp in the outpatient setting  -patient with known elevated peripheral eosinophil count a at 14% in September  -avoid nephrotoxins/contrast dye/diuretics/NSAIDs  -avoid hypotension to prevent decreased renal perfusion  -urinalysis with micro:  Reveals positive nitrates, large leukocytes, +2 protein, no RBCs, 30-50 WBCs  -urine culture pending  -blood cultures pending  -will check tacrolimus level  -renal ultrasound obtained 10/1/2019:  Reported as findings are more concerning for developing transplant rejection-please see full report-will discuss with Dr Yun Hamper  Will continue trend I/O, lab values volume status    2  Metabolic acidosis:  Bicarb 15  -on oral bicarb 1300 mg 3 times daily  -will start IV bicarb drip at 50 mL/hr  -lactic acid 0 5  -will continue to trend    3  Chronic kidney disease IV:  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  -after review of the medical records baseline creatinine previously 1 8-2 1 and follows Dr Jordana Sharp  -patient with recent AK I-may have new baseline  -will continue to trend  -will need follow-up on discharge    4  Chronic immunosuppression:  Status post kidney and pancreas transplant  -with last admission Prograf 2 mg daily at bedtime and and prednisone 5 mg daily 3 mg in the a m   -will check tacrolimus level am at 0830    5   Primary hypertension on top of Chronic Kidney Disease IV:  BP above goal  -per patient blood pressure always elevated and likely related to anxiety  -currently on Cardura 4 mg q h s -recently increased with last hospitalization  -on amlodipine 10 mg every a m  And 5 mg every evening, clonidine 0 3mg 3 times daily, hydralazine 50 mg 3 times daily, Lopressor 50 mg 2 times daily  -will continue to evaluate patient for hypertensive management    6  Anemia chronic disease:  Patient with history of multiple myeloma and Chronic Kidney Disease  -follows Hematology as outpatient  -patient currently on oral iron  -IV iron not given with last admission secondary to urinary tract infection  -no LEO secondary to multiple myeloma  -she is status post 1 unit of packed cells with her last admission on 09/12/2019  -will continue to trend  -transfuse p r n     7  Hyperphosphatemia:  On phosphorus binders with Renvela 800 mg 3 times daily  -will check phosphorus in a m     8  Multiple myeloma:  Biopsy-proven diagnosed in April 2019  -previously on Revlimid-currently on hold since last hospitalization with concerns of AIN  -follows with Oncology as outpatient    9  Recurrent urinary tract infection:  Urine culture pending  -patient with low-grade temp of 99°  -blood cultures pending  -patient was previously on Keflex with last hospitalization  -consider Infectious Disease consult for further evaluation for treatment with recurrent urinary tract infection with immunosuppression    10   Volume:  Patient examining on the dry side  -will start IV fluids at 50 cc bicarb drip until evaluation in the morning        HISTORY OF PRESENT ILLNESS:  Requesting Physician: Maicol Chandler DO  Reason for Consult: EDUARDO/CKD/renal transplant    Candelario Gonzales is a 53 y o  female who with past medical history of CKD IV baseline creatinine 1 8-2 1 and follows Dr Zhen Clark kidney and pancreas transplant in 1998 on immunosuppressive therapy; failed pancreas transplant 2017, type 1 diabetes, hypertension, history of MGUS with progression to multiple myeloma; bone marrow biopsy-proven April 2019, recurrent UTI and polynephritis who presented to the emergency room with complaints of not feeling well with nausea, incontinence, increased urinary frequency and burning  Of note, she had a recent admission 09/09/2019-9/13/2019 with diagnosis of UTI treated with antibiotics and found to have an acute kidney injury with peak creatinine on admission was 4 42 and discharge at 2 88  Unfortunately, the patient never followed up with updated blood work or office follow-up since discharge  After phoning in the office of not feeling well she was instructed to come to the emergency room for further evaluation  In ER, BMP obtained, and revealed a creatinine 2 70 stable potassium 4 6, sodium 140, bicarb 15, lactic acid 0 5 and a renal consultation is requested today for assistance in the management of chronic kidney disease with known renal transplant  On discussion, she reports feeling well since discharge except for 2-3 days ago when symptoms started recurring  She reports decreased oral intake with worsening increased urinary frequency and burning with some nausea  She reports being compliant with all medications to include blood pressure and immunosuppression medications  Currently she denies chest pain, shortness of breath, dizziness, lightheadedness, vomiting, known fevers or chills  She received Zofran for nausea with improvement    Denies swelling    PAST MEDICAL HISTORY:  Past Medical History:   Diagnosis Date    Abnormal liver function test     Acute kidney injury ([address] )     Acute on chronic congestive heart failure (HCC)     Allergic urticaria     Anemia     Cancer (HCC)     Multiple myeloma    Cervical dysplasia     Cholelithiasis     Chronic diastolic (congestive) heart failure ([address] ) 9/18/2017    Diabetes mellitus (HCC)     Previous, controlled with diet    Diabetes mellitus with foot ulcer ([address] )     Disease of thyroid gland     Encephalopathy     Hematuria     + leak est- secondary to UTIs/panc drainage    History of transfusion     Hyperkalemia     Hypertension     Iliotibial band syndrome     Lumbar radiculopathy     Multiple myeloma (HCC)     Multiple myeloma (HCC)     Night blindness     Nonrheumatic aortic (valve) insufficiency     Pneumonia     Renal disorder     Retinopathy     Seborrhea     Seizure ([address] )     Shingles     Sinus tachycardia     B blocker - cardio echo stress test 02 normal/neg LE doppler 2/02 OK and 12/07    Status post simultaneous kidney and pancreas transplant ([address] )     Toe amputation status     Trochanteric bursitis        PAST SURGICAL HISTORY:  Past Surgical History:   Procedure Laterality Date    CATARACT EXTRACTION      CHOLECYSTECTOMY      COLONOSCOPY      two polyps in the rectum removed and biopsied diverticulosis in the sigmoid colon, external hemorrhoiods- Dr Barfield    COMBINED KIDNEY-PANCREAS TRANSPLANT N/A     CT BONE MARROW BIOPSY AND ASPIRATION  4/17/2019    CYSTOSCOPY N/A 10/13/2016    Procedure: CYSTOSCOPY, retrograde pyelogram, biopsy of ureteral polyp; Surgeon: Addi Cardenas MD;  Location: [facility];  Service:    Mart[facility]nez Bones DILATION AND CURETTAGE OF UTERUS      ESOPHAGOGASTRODUODENOSCOPY N/A 11/20/2017    Procedure: ESOPHAGOGASTRODUODENOSCOPY (EGD); Surgeon: Saida Mancera DO;  Location: [facility]; Service: Gastroenterology    EYE SURGERY      cataracts    FOOT AMPUTATION THROUGH METATARSAL Left     FOOT SURGERY Right     excision of metatarsal heads    HALLUX VALGUS CORRECTION Right     NEPHRECTOMY TRANSPLANTED ORGAN      PANCREATIC TRANSPLANT REMOVAL  1998       ALLERGIES:  Allergies   Allergen Reactions    Ixazomib Rash     Ninlaro    Revlimid [Lenalidomide] Rash    Cefadroxil     Latex Rash    Morphine Other (See Comments)     Other reaction(s): Other (See Comments)  projectile vomiting    Morphine And Related GI Intolerance    Myrbetriq [Mirabegron] Hives    Penicillins Hives     Other reaction(s):  Other (See Comments)  Respiratory Distress,hives  Tolerates cefazolin       SOCIAL HISTORY:  Social History     Substance and Sexual Activity   Alcohol Use Never    Frequency: Never    Binge frequency: Never    Comment: (history)     Social History     Substance and Sexual Activity   Drug Use Never    Types: Hydrocodone    Comment: Past cocaine use many years ago - no current use     Social History     Tobacco Use   Smoking Status Former Smoker    Last attempt to quit: 2012    Years since quittin 4   Smokeless Tobacco Never Used       FAMILY HISTORY:  Family History   Problem Relation Age of Onset    Hypertension Mother     Cancer Mother     Hypertension Father     Cancer Father     Cancer Maternal Grandfather     Cancer Paternal Grandmother     Cancer Paternal Grandfather     Depression Sister     Breast cancer Maternal Grandmother 77       MEDICATIONS:  No current facility-administered medications for this encounter       Current Outpatient Medications:     amLODIPine (NORVASC) 10 mg tablet, TAKE 1 TABLET(10MG) BY MOUTH EVERY MORNING AND 1/2 TABLET(5MG) EVERY EVENING, Disp: 135 tablet, Rfl: 0    ARIPiprazole (ABILIFY) 30 mg tablet, Take 1 tablet (30 mg total) by mouth daily at bedtime for 180 days, Disp: 90 tablet, Rfl: 1    aspirin 81 MG tablet, Take 1 tablet by mouth daily, Disp: , Rfl:     busPIRone (BUSPAR) 5 mg tablet, Take 1 tablet (5 mg total) by mouth 2 (two) times a day for 180 days, Disp: 180 tablet, Rfl: 1    Cholecalciferol (VITAMIN D3) 1000 units CAPS, Take 3 capsules by mouth daily  , Disp: , Rfl:     cloNIDine (CATAPRES) 0 1 mg tablet, TAKE 3 TABLETS BY MOUTH EVERY MORNING, 3 TABLETS AT NOON, AND 3 TABLETS EVERY EVENING, Disp: 810 tablet, Rfl: 4    doxazosin (CARDURA) 4 mg tablet, Take 1 tablet (4 mg total) by mouth daily at bedtime, Disp: 30 tablet, Rfl: 0    DULoxetine (CYMBALTA) 60 mg delayed release capsule, Take 1 capsule (60 mg total) by mouth 2 (two) times a day for 180 days, Disp: 180 capsule, Rfl: 1    ferrous sulfate 325 (65 Fe) mg tablet, Take 1 tablet (325 mg total) by mouth daily with breakfast, Disp: 30 tablet, Rfl: 0    folic acid (FOLVITE) 1 mg tablet, TAKE 1 TABLET BY MOUTH DAILY AS DIRECTED, Disp: 90 tablet, Rfl: 3    hydrALAZINE (APRESOLINE) 50 mg tablet, TAKE 1 TABLET(50MG) BY MOUTH THREE TIMES DAILY FOR 90 DAYS, Disp: 270 tablet, Rfl: 0    Insulin Glargine (TOUJEO SOLOSTAR) 300 units/mL CONCETRATED U-300 injection pen, Inject 1 Units under the skin daily at bedtime, Disp: 4 pen, Rfl: 0    insulin lispro (HUMALOG KWIKPEN) 100 units/mL injection pen, INJECT 10 UNDER THE SKIN EVERY DAY OR AS DIRECTED (Patient taking differently: INJECT 10 UNDER THE SKIN EVERY DAY OR AS DIRECTED), Disp: 45 mL, Rfl: 1    Insulin Pen Needle (BD PEN NEEDLE VISHNU U/F) 32G X 4 MM MISC, Use 4 times daily, Disp: 400 each, Rfl: 1    Lancets (ONETOUCH ULTRASOFT) lancets, by Does not apply route 4 (four) times a day  , Disp: , Rfl:     levothyroxine 125 mcg tablet, Take 1 tablet (125 mcg total) by mouth daily, Disp: 90 tablet, Rfl: 2    LORazepam (ATIVAN) 2 mg tablet, Take 1 tablet (2 mg total) by mouth 3 (three) times a day as needed for anxiety for up to 120 days To be filled on or after 6/29/19, Disp: 90 tablet, Rfl: 3    metoprolol tartrate (LOPRESSOR) 50 mg tablet, TAKE 1 TABLET(50MG TOTAL) BY MOUTH TWICE DAILY, Disp: 180 tablet, Rfl: 5    ONE TOUCH ULTRA TEST test strip, Use 4 times daily ( please dispense One touch Ultra test strips), Disp: 400 each, Rfl: 1    polyethylene glycol (MIRALAX) 17 g packet, Take 17 g by mouth daily, Disp: 14 each, Rfl: 0    pravastatin (PRAVACHOL) 80 mg tablet, TAKE 1 TABLET BY MOUTH DAILY, Disp: 90 tablet, Rfl: 5    pravastatin (PRAVACHOL) 80 mg tablet, TAKE 1 TABLET BY MOUTH EVERY DAY, Disp: 90 tablet, Rfl: 3    predniSONE 5 mg tablet, Take 1 tablet (5 mg total) by mouth daily, Disp: 90 tablet, Rfl: 3    rOPINIRole (REQUIP) 0 25 mg tablet, TAKE 1 TABLET BY MOUTH TWICE DAILY, Disp: 180 tablet, Rfl: 0    sertraline (ZOLOFT) 100 mg tablet, Take 2 tablets (200 mg total) by mouth daily for 180 days, Disp: 180 tablet, Rfl: 1    sevelamer carbonate (RENVELA) 800 mg tablet, Take 1 tablet (800 mg total) by mouth 3 (three) times a day with meals, Disp: 270 tablet, Rfl: 3    sodium bicarbonate 650 mg tablet, TAKE 2 TABLETS BY MOUTH THREE TIMES DAILY, Disp: 180 tablet, Rfl: 0    tacrolimus (PROGRAF) 1 mg capsule, Take 3 mg in AM and 2 mg in PM (6/18/19), Disp: 180 capsule, Rfl: 6    REVIEW OF SYSTEMS:  All the systems were reviewed and were negative except as documented on the HPI      PHYSICAL EXAM:  Current Weight: Weight - Scale: 101 kg (222 lb)  First Weight: Weight - Scale: 101 kg (222 lb)  Vitals:    10/01/19 1208 10/01/19 1305 10/01/19 1419   BP: (!) 173/72 (!) 178/74 (!) 211/81   BP Location:  Right arm Right arm   Pulse: 66 64 65   Resp: 20 18 18   Temp: 99 °F (37 2 °C)     TempSrc: Oral     SpO2: 97% 96% 97%   Weight: 101 kg (222 lb)       No intake or output data in the 24 hours ending 10/01/19 1524  General: conscious, coherent, cooperative, not in acute distress  Skin: no rash, warm and dry  Eyes: pale conjunctivae, anicteric sclerae  ENT:  Dry lips and mucous membranes  Neck: supple, no JVD  Chest: clear breath sounds without crackles, rhonchi, wheezes  CVS:  normal rate, regular rhythm  Abdomen: soft, non-tender, non-distended, normoactive bowel sounds  Extremities: no significant edema of both legs, left TMA  Neuro: awake, alert  Psych: appropriate affect        Lab Results:   Results from last 7 days   Lab Units 10/01/19  1222   WBC Thousand/uL 8 58   HEMOGLOBIN g/dL 8 4*   HEMATOCRIT % 27 6*   PLATELETS Thousands/uL 168   POTASSIUM mmol/L 4 6   CHLORIDE mmol/L 118*   CO2 mmol/L 15*   BUN mg/dL 38*   CREATININE mg/dL 2 70*   CALCIUM mg/dL 9 2   ALK PHOS U/L 119*   ALT U/L 15   AST U/L 8       Other Studies:

## 2019-10-01 NOTE — ASSESSMENT & PLAN NOTE
· Follows hematology outpatient (Dr Freida Hooks)  · Treatment currently on hold given infection and renal function

## 2019-10-01 NOTE — ASSESSMENT & PLAN NOTE
· Patient reports to dysuria, increased frequency/incontinence x 2 days   · UA with 30-50 WBC, innumerable bacteria, large leukocytes, positive nitrite   · IV abx: ceftriaxone  · Follow-up urine culture  · BC x 2 pending  · Unfortunately with re-admissions with recurrent UTI  In setting of immunosuppression, as above    Will consult ID for further input

## 2019-10-01 NOTE — ASSESSMENT & PLAN NOTE
· Pt reports to some confusion and lethargy associated with UTI    States, for instance, she could not recall her phone number  · Supportive care  · Treat UTI

## 2019-10-01 NOTE — ASSESSMENT & PLAN NOTE
Lab Results   Component Value Date    HGBA1C 5 1 09/09/2019       No results for input(s): POCGLU in the last 72 hours      Blood Sugar Average: Last 72 hrs:    · Well controlled evidenced by A1C  · Patient takes toujeo 1 unit qhs - continue  · SSI, accu checks

## 2019-10-01 NOTE — ED ATTENDING ATTESTATION
10/1/2019  Delon Hood DO, saw and evaluated the patient  I have discussed the patient with the resident/non-physician practitioner and agree with the resident's/non-physician practitioner's findings, Plan of Care, and MDM as documented in the resident's/non-physician practitioner's note, except where noted  All available labs and Radiology studies were reviewed  I was present for key portions of any procedure(s) performed by the resident/non-physician practitioner and I was immediately available to provide assistance  At this point I agree with the current assessment done in the Emergency Department  I have conducted an independent evaluation of this patient a history and physical is as follows:    53 yof with a hx of frequent UTIs, kidney transplant 21 years ago on immunosuppressants p/w urinary urgency and dysuria that started 2 days ago  Patient recently discharged for similar  Denies fever  Mild suprapubic abd pain, similar to prior UTI  Exam: Mildly tender in suprapubic region  RRR, CTAB  A/P: d/w nephrology given hx of transplant  Admit for further evaluation      ED Course         Critical Care Time  Procedures

## 2019-10-01 NOTE — H&P
[facility]  H&P- [facility] 1965, 53 y o  female MRN: [id number]    Unit/Bed#: -01 Encounter: [id number]    Primary Care Provider: Hola Flores MD   Date and time admitted to hospital: 10/1/2019 12:03 PM      * UTI (urinary tract infection)  Assessment & Plan  · Patient reports to dysuria, increased frequency/incontinence x 2 days   · UA with 30-50 WBC, innumerable bacteria, large leukocytes, positive nitrite   · IV abx: ceftriaxone  · Follow-up urine culture  · BC x 2 pending  · Unfortunately with re-admissions with recurrent UTI  In setting of immunosuppression, as above  Will consult ID for further input    Acute renal failure superimposed on stage 4 chronic kidney disease [facility])  Assessment & Plan  · Nephrology following, appreciate input  She is s/p kidney and pancreas transplant in 1998  · EDUARDO on CKD stage 4  Previous baseline Cr 1 9-2 1  Follows with Dr Flor Finley outpatient  · Was recently admitted 9/13/19 with EDUARDO at that time, peak Cr that admission 4 4  Improved to 2 8 on discharge  · Today Cr 2 7 - slowly improving from previous admission  Lasix continues to be held  · IV bicarb drip given metabolic acidosis   · Avoid hypotension, nephrotoxins   · Transplant kidney US: Arterial resistive indices within transplant lower pole renal parenchyma and transplant renal artery measuring between 0 8 and 0 9, previously resistive indices measuring up to 0 8 in early September  Transplant renal artery dose not suggest stenosis  Findings are more concerning for developing transplant rejection     Metabolic acidosis  Assessment & Plan  · Likely in setting of acute renal failure  · On oral bicarb as an outpatient  · Nephrology following, appreciate input   · IV bicarb drip at 70 mL/hr   · BMP in AM     Acute metabolic encephalopathy  Assessment & Plan  · Pt reports to some confusion and lethargy associated with UTI    States, for instance, she could not recall her phone number  · Supportive care  · Treat UTI     Multiple myeloma not having achieved remission [facility])  Assessment & Plan  · Follows hematology outpatient (Dr Jocelyn Felix)  · Treatment currently on hold given infection and renal function     Major depressive disorder, recurrent episode, in full remission [facility])  Assessment & Plan  · Continue home rx    Status post kidney transplant  Assessment & Plan  · Kidney and pancreas transplant in 1998  · Transplant kidney US: Arterial resistive indices within transplant lower pole renal parenchyma and transplant renal artery measuring between 0 8 and 0 9, previously resistive indices measuring up to 0 8 in early September  Transplant renal artery dose not suggest stenosis  Findings are more concerning for developing transplant rejection   · Nephrology following, appreciate input  · Continue prednisone and Prograf - level pending for AM     Anemia  Assessment & Plan  · Chronic anemia  · Monitor CBC  · Iron supplement     Essential hypertension  Assessment & Plan  · Continue amlodipine 10 mg every morning, 5 mg every evening  Clonidine 0 3 mg TID, hydralazine 50 mg TID, metoprolol 50 mg q12hr , doxazosin 4 mg daily    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 [facility])  Assessment & Plan  Lab Results   Component Value Date    HGBA1C 5 1 09/09/2019       No results for input(s): POCGLU in the last 72 hours      Blood Sugar Average: Last 72 hrs:    · Well controlled evidenced by A1C  · Patient takes toujeo 1 unit qhs - continue  · SSI, accu checks      Acquired hypothyroidism  Assessment & Plan  · Continue levothyroxine     VTE Prophylaxis: Heparin  / sequential compression device   Code Status: level 1 full code  POLST: There is no POLST form on file for this patient (pre-hospital)  Discussion with family: patient, declined call to family     Anticipated Length of Stay:  Patient will be admitted on an Inpatient basis with an anticipated length of stay of  Greater than 2 midnights  Justification for Hospital Stay: acute infection, UTI on IV abx, EDUARDO on CKD    Total Time for Visit, including Counseling / Coordination of Care: 1 hour  Greater than 50% of this total time spent on direct patient counseling and coordination of care  Chief Complaint:   Dysuria, frequency     History of Present Illness: Anant Hernandez is a 53 y o  female with PMH CKD stage 4 s/p kidney and pancreas transplant in 1998, type 1 DM, anemia, HTN, multiple myeloma, depression, toe amputations, who presents with dysuria, urinary frequency/incontinence, fatigue, and confusion  She states this is her typical presentation when she gets a UTI  She has had multiple UTI and admissions for the same, most recently being discharged 9/13  She reports her symptoms started about two days ago and got progressively worse  She reports to some chills but denies fever  States she has some mild suprapubic discomfort  She denies any n/v but reports to lack of appetite  She denies sob, cp, diarrhea, hematuria  Review of Systems:    Review of Systems   Constitutional: Positive for activity change, appetite change and fatigue  HENT: Negative for congestion, sinus pressure, sinus pain and sore throat  Eyes: Negative  Respiratory: Negative for cough and shortness of breath  Cardiovascular: Negative for chest pain and leg swelling  Gastrointestinal: Positive for abdominal pain  Negative for blood in stool, constipation, diarrhea, nausea and vomiting  Endocrine: Negative  Genitourinary: Positive for dysuria, frequency and urgency  Negative for difficulty urinating and hematuria  Musculoskeletal: Negative  Skin: Negative  Neurological: Positive for weakness (chronic)  Negative for dizziness and light-headedness  Hematological: Negative  Psychiatric/Behavioral: Negative          Past Medical and Surgical History:     Past Medical History:   Diagnosis Date    Abnormal liver function test     Acute kidney [facility] ([facility] )     Acute on chronic congestive heart failure (HCC)     Allergic urticaria     Anemia     Cancer (HCC)     Multiple myeloma    Cervical dysplasia     Cholelithiasis     Chronic diastolic (congestive) heart failure ([facility] ) 9/18/2017    Diabetes [facility]     Previous, controlled with diet    Diabetes mellitus with foot ulcer ([facility] )     Disease of thyroid gland     Encephalopathy     Hematuria     + leak est- secondary to UTIs/panc drainage    History of transfusion     Hyperkalemia     Hypertension     Iliotibial band syndrome     Lumbar radiculopathy     Multiple myeloma (HCC)     Multiple myeloma ([facility] )     Night blindness     Nonrheumatic aortic (valve) insufficiency     Pneumonia     Renal disorder     Retinopathy     Seborrhea     Seizure [facility]     Shingles     Sinus tachycardia     B blocker - cardio echo stress test 02 normal/neg LE doppler 2/02 OK and 12/07    Status post simultaneous kidney and pancreas transplant ([facility] )     Toe amputation status     Trochanteric bursitis        Past Surgical History:   Procedure Laterality Date    CATARACT EXTRACTION      CHOLECYSTECTOMY      COLONOSCOPY      two polyps in the rectum removed and biopsied diverticulosis in the sigmoid colon, external hemorrhoiods- Dr Barfield    COMBINED KIDNEY-PANCREAS TRANSPLANT N/A     CT BONE MARROW BIOPSY AND ASPIRATION  4/17/2019    CYSTOSCOPY N/A 10/13/2016    Procedure: CYSTOSCOPY, retrograde pyelogram, biopsy of ureteral polyp; Surgeon: Jeffrey Arredondo MD;  Location: [facility];  Service:    Maite Elias DILATION AND CURETTAGE OF UTERUS      ESOPHAGOGASTRODUODENOSCOPY N/A 11/20/2017    Procedure: ESOPHAGOGASTRODUODENOSCOPY (EGD); Surgeon: Addi Vieira DO;  Location: [facility];   Service: Gastroenterology    EYE SURGERY      cataracts    FOOT AMPUTATION THROUGH METATARSAL Left     FOOT SURGERY Right     excision of metatarsal heads    HALLUX VALGUS CORRECTION Right  NEPHRECTOMY TRANSPLANTED ORGAN      PANCREATIC TRANSPLANT REMOVAL  1998       Meds/Allergies:    Prior to Admission medications    Medication Sig Start Date End Date Taking?  Authorizing Provider   amLODIPine (NORVASC) 10 mg tablet TAKE 1 TABLET(10MG) BY MOUTH EVERY MORNING AND 1/2 TABLET(5MG) EVERY EVENING  Patient taking differently: Take 10 mg by mouth every morning TAKE 1 TABLET(10MG) BY MOUTH EVERY MORNING AND 1/2 TABLET(5MG) EVERY EVENING 4/8/19  Yes Bethann Hammans, MD   amLODIPine (NORVASC) 5 mg tablet Take 5 mg by mouth every evening   Yes Historical Provider, MD   ARIPiprazole (ABILIFY) 30 mg tablet Take 1 tablet (30 mg total) by mouth daily at bedtime for 180 days 6/5/19 12/2/19 Yes Lottie Díaz MD   aspirin 81 MG tablet Take 1 tablet by mouth daily 6/5/13  Yes Historical Provider, MD   busPIRone (BUSPAR) 5 mg tablet Take 1 tablet (5 mg total) by mouth 2 (two) times a day for 180 days 6/5/19 12/2/19 Yes Lottie Díaz MD   Cholecalciferol (VITAMIN D3) 1000 units CAPS Take 3 capsules by mouth daily     Yes Historical Provider, MD   cloNIDine (CATAPRES) 0 1 mg tablet TAKE 3 TABLETS BY MOUTH EVERY MORNING, 3 TABLETS AT NOON, AND 3 TABLETS EVERY EVENING  Patient taking differently: 0 3 mg every 8 (eight) hours TAKE 3 TABLETS BY MOUTH EVERY MORNING, 3 TABLETS AT NOON, AND 3 TABLETS EVERY EVENING 5/26/19  Yes MER Clinton   doxazosin (CARDURA) 4 mg tablet Take 1 tablet (4 mg total) by mouth daily at bedtime 9/13/19  Yes Marii Hampton DO   DULoxetine (CYMBALTA) 60 mg delayed release capsule Take 1 capsule (60 mg total) by mouth 2 (two) times a day for 180 days 6/5/19 12/2/19 Yes Lottie Díaz MD   ferrous sulfate 325 (65 Fe) mg tablet Take 1 tablet (325 mg total) by mouth daily with breakfast 9/14/19  Yes Marii Hampton DO   folic acid (FOLVITE) 1 mg tablet TAKE 1 TABLET BY MOUTH DAILY AS DIRECTED 2/22/19  Yes Sydney Mendoza MD   hydrALAZINE (APRESOLINE) 50 mg tablet TAKE 1 TABLET(50MG) BY MOUTH THREE TIMES DAILY FOR 90 DAYS 7/20/19  Yes Joana Navarrete MD   Insulin Glargine (TOUJEO SOLOSTAR) 300 units/mL CONCETRATED U-300 injection pen Inject 1 Units under the skin daily at bedtime 1/10/19  Yes Tyra Alfaro MD   insulin lispro (HUMALOG KWIKPEN) 100 units/mL injection pen INJECT 10 UNDER THE SKIN EVERY DAY OR AS DIRECTED  Patient taking differently: INJECT 10 UNDER THE SKIN EVERY DAY OR AS DIRECTED 5/21/19  Yes Tyra Alfaro MD   Insulin Pen Needle (BD PEN NEEDLE VISHNU U/F) 32G X 4 MM MISC Use 4 times daily 8/23/18  Yes Tyra Alfaro MD   Lancets (ONETOUCH ULTRASOFT) lancets by Does not apply route 4 (four) times a day   3/9/16  Yes Historical MD Brenda   levothyroxine 125 mcg tablet Take 1 tablet (125 mcg total) by mouth daily 6/24/19  Yes Nya Lutz PA-C   LORazepam (ATIVAN) 2 mg tablet Take 1 tablet (2 mg total) by mouth 3 (three) times a day as needed for anxiety for up to 120 days To be filled on or after 6/29/19 6/29/19 10/27/19 Yes Kavitha Eduardo MD   metoprolol tartrate (LOPRESSOR) 50 mg tablet TAKE 1 TABLET(50MG TOTAL) BY MOUTH TWICE DAILY 7/9/18  Yes Joana Navarrete MD   ONE TOUCH ULTRA TEST test strip Use 4 times daily ( please dispense One touch Ultra test strips) 7/9/19  Yes Tyra Alfaro MD   polyethylene glycol (MIRALAX) 17 g packet Take 17 g by mouth daily 9/14/19  Yes Paco Cano DO   pravastatin (PRAVACHOL) 80 mg tablet TAKE 1 TABLET BY MOUTH DAILY 1/29/18  Yes Filomena Ordaz MD   pravastatin (PRAVACHOL) 80 mg tablet TAKE 1 TABLET BY MOUTH EVERY DAY 9/14/19  Yes Filomena Ordaz MD   predniSONE 5 mg tablet Take 1 tablet (5 mg total) by mouth daily 5/24/19  Yes MER Acosta   rOPINIRole (REQUIP) 0 25 mg tablet TAKE 1 TABLET BY MOUTH TWICE DAILY 8/18/19  Yes Filomena Ordaz MD   sertraline (ZOLOFT) 100 mg tablet Take 2 tablets (200 mg total) by mouth daily for 180 days 6/5/19 12/2/19 Yes Kavitha Eduardo, MD   sevelamer carbonate (RENVELA) 800 mg tablet Take 1 tablet (800 mg total) by mouth 3 (three) times a day with meals 19  Yes Gavin Acevedo MD   sodium bicarbonate 650 mg tablet TAKE 2 TABLETS BY MOUTH THREE TIMES DAILY 19  Yes Gavin Acevedo MD   tacrolimus (PROGRAF) 1 mg capsule Take 3 mg in AM and 2 mg in PM (19)  Patient taking differently: Take 3 mg by mouth every morning Take 3 mg in AM and 2 mg in PM (19) 19  Yes Gavin Acevedo MD   tacrolimus (PROGRAF) 1 mg capsule Take 2 mg by mouth every evening   Yes Historical Provider, MD     I have reviewed home medications with patient personally  Allergies: Allergies   Allergen Reactions    Ixazomib Rash     Ninlaro    Revlimid [Lenalidomide] Rash    Cefadroxil     Latex Rash    Morphine Other (See Comments)     Other reaction(s): Other (See Comments)  projectile vomiting    Morphine And Related GI Intolerance    Myrbetriq [Mirabegron] Hives    Penicillins Hives     Other reaction(s):  Other (See Comments)  Respiratory Distress,hives  Tolerates cefazolin       Social History:     Marital Status: Legally    Occupation:   Patient Pre-hospital Living Situation:   Patient Pre-hospital Level of Mobility:   Patient Pre-hospital Diet Restrictions:   Substance Use History:   Social History     Substance and Sexual Activity   Alcohol Use Never    Frequency: Never    Binge frequency: Never    Comment: (history)     Social History     Tobacco Use   Smoking Status Former Smoker    Last attempt to quit: 2012    Years since quittin 4   Smokeless Tobacco Never Used     Social History     Substance and Sexual Activity   Drug Use Never    Types: Hydrocodone    Comment: Past cocaine use many years ago - no current use       Family History:    Family History   Problem Relation Age of Onset    Hypertension Mother     Cancer Mother     Hypertension Father     Cancer Father     Cancer Maternal Grandfather     Cancer Paternal Grandmother     Cancer Paternal Grandfather     Depression Sister     Breast cancer Maternal Grandmother 77       Physical Exam:     Vitals:   Blood Pressure: 170/68 (10/01/19 1714)  Pulse: 65 (10/01/19 1714)  Temperature: 97 9 °F (36 6 °C) (10/01/19 1714)  Temp Source: Oral (10/01/19 1208)  Respirations: 16 (10/01/19 1714)  Height: 5' 8" (172 7 cm) (10/01/19 1714)  Weight - Scale: 99 3 kg (218 lb 14 7 oz) (10/01/19 1714)  SpO2: 95 % (10/01/19 1714)    Physical Exam   Constitutional: She is oriented to person, place, and time  No distress  HENT:   Head: Normocephalic and atraumatic  Eyes: EOM are normal  No scleral icterus  Neck: Normal range of motion  Neck supple  Cardiovascular: Normal rate and regular rhythm  Pulmonary/Chest: Effort normal and breath sounds normal  No respiratory distress  She has no wheezes  Abdominal: Soft  Bowel sounds are normal  She exhibits no distension  There is tenderness (Mild tenderness llq/suprapubic )  Musculoskeletal: Normal range of motion  She exhibits no edema  S/p toe amputations   Neurological: She is alert and oriented to person, place, and time  No cranial nerve deficit  Skin: Skin is warm and dry  She is not diaphoretic  Psychiatric: She has a normal mood and affect  Her behavior is normal    Vitals reviewed  Additional Data:     Lab Results: I have personally reviewed pertinent reports        Results from last 7 days   Lab Units 10/01/19  1222   WBC Thousand/uL 8 58   HEMOGLOBIN g/dL 8 4*   HEMATOCRIT % 27 6*   PLATELETS Thousands/uL 168   NEUTROS PCT % 74   LYMPHS PCT % 15   MONOS PCT % 5   EOS PCT % 3     Results from last 7 days   Lab Units 10/01/19  1222   SODIUM mmol/L 140   POTASSIUM mmol/L 4 6   CHLORIDE mmol/L 118*   CO2 mmol/L 15*   BUN mg/dL 38*   CREATININE mg/dL 2 70*   ANION GAP mmol/L 7   CALCIUM mg/dL 9 2   ALBUMIN g/dL 3 0*   TOTAL BILIRUBIN mg/dL 0 35   ALK PHOS U/L 119*   ALT U/L 15   AST U/L 8 GLUCOSE RANDOM mg/dL 93                 Results from last 7 days   Lab Units 10/01/19  1222   LACTIC ACID mmol/L 0 5       Imaging: I have personally reviewed pertinent reports  US transplant kidney with doppler   Final Result by Arlen Padgett DO (10/01 1426)   Arterial resistive indices within the transplant lower pole renal parenchyma and transplant renal artery measuring between 0 8 and 0 9, previously resistive indices measuring up to 0 8 in early September  Transplant renal artery and transplant renal vein are patent  Spectral analysis of the transplant renal artery does not suggest stenosis  Findings are more concerning for developing transplant rejection  No hydronephrosis  Simple renal cysts within both the left pelvic transplant kidney and right native kidney  Left native kidney not reliably identified,  known to be significantly atrophic  The study was marked in [facility]S Lists of hospitals in the [place] for immediate notification  Workstation performed: [id number]             EKG, Pathology, and Other Studies Reviewed on Admission:   · EKG: nsr    Allscripts / Epic Records Reviewed: Yes     ** Please Note: This note has been constructed using a voice recognition system   **

## 2019-10-01 NOTE — TELEPHONE ENCOUNTER
Thank you  Agree  Pt needs to be in ER  She was supposed to have labs a few weeks ago, but declined [place]  I spoke with pt over phone today  She is feeling incontinence and not well   She agrees she needs to be in the hospital     np

## 2019-10-02 PROBLEM — G93.41 ACUTE METABOLIC ENCEPHALOPATHY: Status: RESOLVED | Noted: 2019-09-10 | Resolved: 2019-10-02

## 2019-10-02 LAB
ANION GAP SERPL CALCULATED.3IONS-SCNC: 10 MMOL/L (ref 4–13)
BUN SERPL-MCNC: 36 MG/DL (ref 5–25)
CALCIUM SERPL-MCNC: 8.7 MG/DL (ref 8.3–10.1)
CHLORIDE SERPL-SCNC: 117 MMOL/L (ref 100–108)
CO2 SERPL-SCNC: 15 MMOL/L (ref 21–32)
CREAT SERPL-MCNC: 2.75 MG/DL (ref 0.6–1.3)
GFR SERPL CREATININE-BSD FRML MDRD: 19 ML/MIN/1.73SQ M
GLUCOSE SERPL-MCNC: 103 MG/DL (ref 65–140)
GLUCOSE SERPL-MCNC: 114 MG/DL (ref 65–140)
GLUCOSE SERPL-MCNC: 116 MG/DL (ref 65–140)
GLUCOSE SERPL-MCNC: 137 MG/DL (ref 65–140)
GLUCOSE SERPL-MCNC: 160 MG/DL (ref 65–140)
MAGNESIUM SERPL-MCNC: 2.1 MG/DL (ref 1.6–2.6)
PHOSPHATE SERPL-MCNC: 4.4 MG/DL (ref 2.7–4.5)
POTASSIUM SERPL-SCNC: 4.5 MMOL/L (ref 3.5–5.3)
SODIUM SERPL-SCNC: 142 MMOL/L (ref 136–145)

## 2019-10-02 PROCEDURE — 97163 PT EVAL HIGH COMPLEX 45 MIN: CPT

## 2019-10-02 PROCEDURE — 99232 SBSQ HOSP IP/OBS MODERATE 35: CPT | Performed by: INTERNAL MEDICINE

## 2019-10-02 PROCEDURE — G8979 MOBILITY GOAL STATUS: HCPCS

## 2019-10-02 PROCEDURE — 99223 1ST HOSP IP/OBS HIGH 75: CPT | Performed by: INTERNAL MEDICINE

## 2019-10-02 PROCEDURE — 99232 SBSQ HOSP IP/OBS MODERATE 35: CPT | Performed by: PHYSICIAN ASSISTANT

## 2019-10-02 PROCEDURE — 83735 ASSAY OF MAGNESIUM: CPT | Performed by: PHYSICIAN ASSISTANT

## 2019-10-02 PROCEDURE — 82948 REAGENT STRIP/BLOOD GLUCOSE: CPT

## 2019-10-02 PROCEDURE — 80048 BASIC METABOLIC PNL TOTAL CA: CPT | Performed by: PHYSICIAN ASSISTANT

## 2019-10-02 PROCEDURE — G8978 MOBILITY CURRENT STATUS: HCPCS

## 2019-10-02 PROCEDURE — 84100 ASSAY OF PHOSPHORUS: CPT | Performed by: PHYSICIAN ASSISTANT

## 2019-10-02 PROCEDURE — 80197 ASSAY OF TACROLIMUS: CPT | Performed by: PHYSICIAN ASSISTANT

## 2019-10-02 RX ORDER — DOXAZOSIN MESYLATE 4 MG/1
4 TABLET ORAL
Status: DISCONTINUED | OUTPATIENT
Start: 2019-10-02 | End: 2019-10-04 | Stop reason: HOSPADM

## 2019-10-02 RX ORDER — METOPROLOL TARTRATE 50 MG/1
50 TABLET, FILM COATED ORAL EVERY 12 HOURS SCHEDULED
Status: DISCONTINUED | OUTPATIENT
Start: 2019-10-02 | End: 2019-10-04 | Stop reason: HOSPADM

## 2019-10-02 RX ORDER — HYDRALAZINE HYDROCHLORIDE 50 MG/1
50 TABLET, FILM COATED ORAL EVERY 8 HOURS SCHEDULED
Status: DISCONTINUED | OUTPATIENT
Start: 2019-10-02 | End: 2019-10-04 | Stop reason: HOSPADM

## 2019-10-02 RX ORDER — AMLODIPINE BESYLATE 5 MG/1
5 TABLET ORAL EVERY EVENING
Status: DISCONTINUED | OUTPATIENT
Start: 2019-10-02 | End: 2019-10-04 | Stop reason: HOSPADM

## 2019-10-02 RX ORDER — CLONIDINE HYDROCHLORIDE 0.1 MG/1
0.3 TABLET ORAL EVERY 8 HOURS SCHEDULED
Status: DISCONTINUED | OUTPATIENT
Start: 2019-10-02 | End: 2019-10-04 | Stop reason: HOSPADM

## 2019-10-02 RX ORDER — AMLODIPINE BESYLATE 10 MG/1
10 TABLET ORAL EVERY MORNING
Status: DISCONTINUED | OUTPATIENT
Start: 2019-10-03 | End: 2019-10-04 | Stop reason: HOSPADM

## 2019-10-02 RX ADMIN — AMLODIPINE BESYLATE 10 MG: 10 TABLET ORAL at 10:10

## 2019-10-02 RX ADMIN — INSULIN LISPRO 1 UNITS: 100 INJECTION, SOLUTION INTRAVENOUS; SUBCUTANEOUS at 17:51

## 2019-10-02 RX ADMIN — ROPINIROLE HYDROCHLORIDE 0.25 MG: 0.25 TABLET, FILM COATED ORAL at 17:52

## 2019-10-02 RX ADMIN — HYDRALAZINE HYDROCHLORIDE 50 MG: 50 TABLET ORAL at 22:12

## 2019-10-02 RX ADMIN — CEFTRIAXONE SODIUM 1000 MG: 10 INJECTION, POWDER, FOR SOLUTION INTRAVENOUS at 14:15

## 2019-10-02 RX ADMIN — DOXAZOSIN 4 MG: 4 TABLET ORAL at 22:13

## 2019-10-02 RX ADMIN — ARIPIPRAZOLE 30 MG: 10 TABLET ORAL at 22:13

## 2019-10-02 RX ADMIN — LORAZEPAM 2 MG: 1 TABLET ORAL at 14:20

## 2019-10-02 RX ADMIN — LEVOTHYROXINE SODIUM 125 MCG: 125 TABLET ORAL at 05:35

## 2019-10-02 RX ADMIN — DULOXETINE HYDROCHLORIDE 60 MG: 60 CAPSULE, DELAYED RELEASE ORAL at 10:09

## 2019-10-02 RX ADMIN — SEVELAMER HYDROCHLORIDE 800 MG: 800 TABLET, FILM COATED PARENTERAL at 10:09

## 2019-10-02 RX ADMIN — CLONIDINE HYDROCHLORIDE 0.3 MG: 0.1 TABLET ORAL at 22:12

## 2019-10-02 RX ADMIN — SERTRALINE HYDROCHLORIDE 200 MG: 100 TABLET ORAL at 10:08

## 2019-10-02 RX ADMIN — BUSPIRONE HYDROCHLORIDE 5 MG: 5 TABLET ORAL at 10:10

## 2019-10-02 RX ADMIN — HYDRALAZINE HYDROCHLORIDE 50 MG: 50 TABLET ORAL at 05:30

## 2019-10-02 RX ADMIN — SEVELAMER HYDROCHLORIDE 800 MG: 800 TABLET, FILM COATED PARENTERAL at 17:52

## 2019-10-02 RX ADMIN — AMLODIPINE BESYLATE 5 MG: 5 TABLET ORAL at 17:52

## 2019-10-02 RX ADMIN — HEPARIN SODIUM 5000 UNITS: 5000 INJECTION INTRAVENOUS; SUBCUTANEOUS at 22:14

## 2019-10-02 RX ADMIN — FERROUS SULFATE TAB 325 MG (65 MG ELEMENTAL FE) 325 MG: 325 (65 FE) TAB at 10:10

## 2019-10-02 RX ADMIN — HEPARIN SODIUM 5000 UNITS: 5000 INJECTION INTRAVENOUS; SUBCUTANEOUS at 14:15

## 2019-10-02 RX ADMIN — ASPIRIN 81 MG 81 MG: 81 TABLET ORAL at 10:09

## 2019-10-02 RX ADMIN — SODIUM BICARBONATE 70 ML/HR: 84 INJECTION, SOLUTION INTRAVENOUS at 11:34

## 2019-10-02 RX ADMIN — TACROLIMUS 2 MG: 1 CAPSULE ORAL at 17:51

## 2019-10-02 RX ADMIN — FOLIC ACID 1000 MCG: 1 TABLET ORAL at 10:09

## 2019-10-02 RX ADMIN — INSULIN GLARGINE 1 UNITS: 100 INJECTION, SOLUTION SUBCUTANEOUS at 22:12

## 2019-10-02 RX ADMIN — TACROLIMUS 3 MG: 1 CAPSULE ORAL at 10:11

## 2019-10-02 RX ADMIN — SEVELAMER HYDROCHLORIDE 800 MG: 800 TABLET, FILM COATED PARENTERAL at 12:10

## 2019-10-02 RX ADMIN — ROPINIROLE HYDROCHLORIDE 0.25 MG: 0.25 TABLET, FILM COATED ORAL at 10:08

## 2019-10-02 RX ADMIN — PRAVASTATIN SODIUM 80 MG: 80 TABLET ORAL at 17:53

## 2019-10-02 RX ADMIN — BUSPIRONE HYDROCHLORIDE 5 MG: 5 TABLET ORAL at 17:52

## 2019-10-02 RX ADMIN — DULOXETINE HYDROCHLORIDE 60 MG: 60 CAPSULE, DELAYED RELEASE ORAL at 17:52

## 2019-10-02 RX ADMIN — HYDRALAZINE HYDROCHLORIDE 50 MG: 50 TABLET ORAL at 14:15

## 2019-10-02 RX ADMIN — METOPROLOL TARTRATE 50 MG: 50 TABLET, FILM COATED ORAL at 22:13

## 2019-10-02 RX ADMIN — PREDNISONE 5 MG: 5 TABLET ORAL at 10:10

## 2019-10-02 RX ADMIN — CLONIDINE HYDROCHLORIDE 0.3 MG: 0.1 TABLET ORAL at 05:30

## 2019-10-02 RX ADMIN — CLONIDINE HYDROCHLORIDE 0.3 MG: 0.1 TABLET ORAL at 14:15

## 2019-10-02 RX ADMIN — HEPARIN SODIUM 5000 UNITS: 5000 INJECTION INTRAVENOUS; SUBCUTANEOUS at 05:35

## 2019-10-02 RX ADMIN — METOPROLOL TARTRATE 50 MG: 50 TABLET, FILM COATED ORAL at 10:09

## 2019-10-02 NOTE — PLAN OF CARE
Problem: Potential for Falls  Goal: Patient will remain free of falls  Description  INTERVENTIONS:  - Assess patient frequently for physical needs  -  Identify cognitive and physical deficits and behaviors that affect risk of falls    -  [place] fall precautions as indicated by assessment   - Educate patient/family on patient safety including physical limitations  - Instruct patient to call for assistance with activity based on assessment  - Modify environment to reduce risk of injury  - Consider OT/PT consult to assist with strengthening/mobility  Outcome: Progressing     Problem: PAIN - ADULT  Goal: Verbalizes/displays adequate comfort level or baseline comfort level  Description  Interventions:  - Encourage patient to monitor pain and request assistance  - Assess pain using appropriate pain scale  - Administer analgesics based on type and severity of pain and evaluate response  - Implement non-pharmacological measures as appropriate and evaluate response  - Consider cultural and social influences on pain and pain management  - Notify physician/advanced practitioner if interventions unsuccessful or patient reports new pain  Outcome: Progressing     Problem: INFECTION - ADULT  Goal: Absence or prevention of progression during hospitalization  Description  INTERVENTIONS:  - Assess and monitor for signs and symptoms of infection  - Monitor lab/diagnostic results  - Monitor all insertion sites, i e  indwelling lines, tubes, and drains  - Monitor endotracheal if appropriate and nasal secretions for changes in amount and color  - [place] appropriate cooling/warming therapies per order  - Administer medications as ordered  - Instruct and encourage patient and family to use good hand hygiene technique  - Identify and instruct in appropriate isolation precautions for identified infection/condition  Outcome: Progressing  Goal: Absence of fever/infection during neutropenic period  Description  INTERVENTIONS:  - Monitor WBC    Outcome: Progressing     Problem: SAFETY ADULT  Goal: Maintain or return to baseline ADL function  Description  INTERVENTIONS:  -  Assess patient's ability to carry out ADLs; assess patient's baseline for ADL function and identify physical deficits which impact ability to perform ADLs (bathing, care of mouth/teeth, toileting, grooming, dressing, etc )  - Assess/evaluate cause of self-care deficits   - Assess range of motion  - Assess patient's mobility; develop plan if impaired  - Assess patient's need for assistive devices and provide as appropriate  - Encourage maximum independence but intervene and supervise when necessary  - Involve family in performance of ADLs  - Assess for home care needs following discharge   - Consider OT consult to assist with ADL evaluation and planning for discharge  - Provide patient education as appropriate  Outcome: Progressing  Goal: Maintain or return mobility status to optimal level  Description  INTERVENTIONS:  - Assess patient's baseline mobility status (ambulation, transfers, stairs, etc )    - Identify cognitive and physical deficits and behaviors that affect mobility  - Identify mobility aids required to assist with transfers and/or ambulation (gait belt, sit-to-stand, lift, walker, cane, etc )  - [place] fall precautions as indicated by assessment  - Record patient progress and toleration of activity level on Mobility SBAR; progress patient to next Phase/Stage  - Instruct patient to call for assistance with activity based on assessment  - Consider rehabilitation consult to assist with strengthening/weightbearing, etc   Outcome: Progressing     Problem: DISCHARGE PLANNING  Goal: Discharge to home or other facility with appropriate resources  Description  INTERVENTIONS:  - Identify barriers to discharge w/patient and caregiver  - Arrange for needed discharge resources and transportation as appropriate  - Identify discharge learning needs (meds, wound care, etc )  - Arrange for interpretive services to assist at discharge as needed  - Refer to Case Management Department for coordinating discharge planning if the patient needs post-hospital services based on physician/advanced practitioner order or complex needs related to functional status, cognitive ability, or social support system  Outcome: Progressing     Problem: Knowledge Deficit  Goal: Patient/family/caregiver demonstrates understanding of disease process, treatment plan, medications, and discharge instructions  Description  Complete learning assessment and assess knowledge base    Interventions:  - Provide teaching at level of understanding  - Provide teaching via preferred learning methods  Outcome: Progressing

## 2019-10-02 NOTE — ASSESSMENT & PLAN NOTE
· Kidney and pancreas transplant in 1998  · Transplant kidney US: Arterial resistive indices within transplant lower pole renal parenchyma and transplant renal artery measuring between 0 8 and 0 9, previously resistive indices measuring up to 0 8 in early September  Transplant renal artery dose not suggest stenosis    Findings are more concerning for developing transplant rejection   · Nephrology following, appreciate input  · Continue prednisone and Prograf - level pending

## 2019-10-02 NOTE — ASSESSMENT & PLAN NOTE
· Patient reports to dysuria, increased frequency/incontinence x 2 days  Now resolved  · UA with 30-50 WBC, innumerable bacteria, large leukocytes, positive nitrite   · IV abx: ceftriaxone  · Follow-up urine culture  · BC x 2 pending  · Unfortunately with re-admissions with recurrent UTI  In setting of immunosuppression, as above    · Appreciate input from ID   · Consider urology eval given recurrent sx, ?anatomical issue   · Check bladder scan

## 2019-10-02 NOTE — PROGRESS NOTES
Progress Note - Owen Laird 1965, 53 y o  female MRN: [id number]  Unit/Bed#: -01 Encounter: [id number] DOS: 10/2/19  Primary Care Provider: Clarita Jones MD   Date and time admitted to hospital: 10/1/2019 12:03 PM    * UTI (urinary tract infection)  Assessment & Plan  · Patient reports to dysuria, increased frequency/incontinence x 2 days  Now resolved  · UA with 30-50 WBC, innumerable bacteria, large leukocytes, positive nitrite   · IV abx: ceftriaxone  · Follow-up urine culture  · BC x 2 pending  · Unfortunately with re-admissions with recurrent UTI  In setting of immunosuppression, as above  · Appreciate input from ID   · Consider urology eval given recurrent sx, ?anatomical issue   · Check bladder scan     Acute renal failure superimposed on stage 4 chronic kidney disease [facility])  Assessment & Plan  · Nephrology following, appreciate input  She is s/p kidney and pancreas transplant in 1998  · EDUARDO on CKD stage 4  Previous baseline Cr 1 9-2 1  Follows with Dr Lieutenant Coyle outpatient  · Was recently admitted 9/13/19 with EDUARDO at that time, peak Cr that admission 4 4  Improved to 2 8 on discharge  · Today Cr 2 75   · Lasix continues to be held  · IV bicarb drip given metabolic acidosis   · Transplant kidney US: Arterial resistive indices within transplant lower pole renal parenchyma and transplant renal artery measuring between 0 8 and 0 9, previously resistive indices measuring up to 0 8 in early September  Transplant renal artery dose not suggest stenosis    Findings are more concerning for developing transplant rejection     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 [facility])  Assessment & Plan  Lab Results   Component Value Date    HGBA1C 5 1 09/09/2019     Recent Labs     10/01/19  1818 10/01/19  2054 10/02/19  0638   POCGLU 124 181* 137     Blood Sugar Average: Last 72 hrs:  · Well controlled evidenced by A1C  · Patient takes toujeo 1 unit qhs - continue  · SSI, accu checks  (P) [de-identified]    Metabolic acidosis  Assessment & Plan  · Likely in setting of acute renal failure  · On oral bicarb as an outpatient - continue IV bicarb drip   · Nephrology following, appreciate input   · BMP in AM     Multiple myeloma not having achieved remission [facility])  Assessment & Plan  · [facility] hematology outpatient (Dr Rachel Humphries)  · Treatment currently on hold given infection and renal function     Major depressive disorder, recurrent episode, in full remission ([facility] [address] )  Assessment & Plan  · Continue Abilify, BuSpar, Cymbalta, Zoloft, p r n  Ativan    Status post kidney transplant  Assessment & Plan  · Kidney and pancreas transplant in 1998  · Transplant kidney US: Arterial resistive indices within transplant lower pole renal parenchyma and transplant renal artery measuring between 0 8 and 0 9, previously resistive indices measuring up to 0 8 in early September  Transplant renal artery dose not suggest stenosis  Findings are more concerning for developing transplant rejection   · Nephrology following, appreciate input  · Continue prednisone and Prograf - level pending      Essential hypertension  Assessment & Plan  · Continue amlodipine 10 mg every morning, 5 mg every evening  Clonidine 0 3 mg TID, hydralazine 50 mg TID, metoprolol 50 mg q12hr , doxazosin 4 mg daily    Acquired hypothyroidism  Assessment & Plan  · Continue levothyroxine     Acute metabolic encephalopathyresolved as of 10/2/2019  Assessment & Plan  · Pt reports to some confusion and lethargy associated with UTI  States, for instance, she could not recall her phone number  · Supportive care  · Treat UTI   · This has resolved     VTE Pharmacologic Prophylaxis:   Pharmacologic: Heparin  Mechanical VTE Prophylaxis in Place: Yes    Patient Centered Rounds: I have performed bedside rounds with nursing staff today      Discussions with Specialists or Other Care Team Provider: nursing     Education and Discussions with Family / Patient: patient, mother at bedside     Time Spent for Care: 30 minutes  More than 50% of total time spent on counseling and coordination of care as described above  Current Length of Stay: 1 day(s)    Current Patient Status: Inpatient   Certification Statement: The patient will continue to require additional inpatient hospital stay due to ongoing tx of UTI with IV abx, EDUARDO on CKD     Discharge Plan: pending clinical course, not medically stable     Code Status: Level 1 - Full Code      Subjective:   Pt seen and examined at bedside  Notes improvement in dysuria and incontinence  No fevers or chills  Objective:     Vitals:   Temp (24hrs), Av 1 °F (36 7 °C), Min:97 3 °F (36 3 °C), Max:99 °F (37 2 °C)    Temp:  [97 3 °F (36 3 °C)-99 °F (37 2 °C)] 97 3 °F (36 3 °C)  HR:  [61-80] 61  Resp:  [16-20] 17  BP: (156-211)/(62-81) 156/70  SpO2:  [95 %-99 %] 99 %  Body mass index is 33 65 kg/m²  Input and Output Summary (last 24 hours): Intake/Output Summary (Last 24 hours) at 10/2/2019 1038  Last data filed at 10/2/2019 0937  Gross per 24 hour   Intake 520 ml   Output 400 ml   Net 120 ml       Physical Exam:     Physical Exam   Constitutional: She is oriented to person, place, and time  She appears well-developed and well-nourished  No distress  chronically ill appearing    HENT:   Head: Normocephalic and atraumatic  Eyes: EOM are normal  No scleral icterus  Neck: Normal range of motion  Neck supple  Cardiovascular: Normal rate, regular rhythm and normal heart sounds  No murmur heard  Pulmonary/Chest: Effort normal and breath sounds normal    Abdominal: Soft  Bowel sounds are normal    Musculoskeletal: Normal range of motion  She exhibits no edema  Neurological: She is alert and oriented to person, place, and time  Skin: Skin is warm and dry  Psychiatric: She has a normal mood and affect       Additional Data:     Labs:    Results from last 7 days   Lab Units 10/01/19  1222   WBC Thousand/uL 8 58   HEMOGLOBIN g/dL 8 4*   HEMATOCRIT % 27 6*   PLATELETS Thousands/uL 168   NEUTROS PCT % 74   LYMPHS PCT % 15   MONOS PCT % 5   EOS PCT % 3     Results from last 7 days   Lab Units 10/02/19  0444 10/01/19  1222   SODIUM mmol/L 142 140   POTASSIUM mmol/L 4 5 4 6   CHLORIDE mmol/L 117* 118*   CO2 mmol/L 15* 15*   BUN mg/dL 36* 38*   CREATININE mg/dL 2 75* 2 70*   ANION GAP mmol/L 10 7   CALCIUM mg/dL 8 7 9 2   ALBUMIN g/dL  --  3 0*   TOTAL BILIRUBIN mg/dL  --  0 35   ALK PHOS U/L  --  119*   ALT U/L  --  15   AST U/L  --  8   GLUCOSE RANDOM mg/dL 114 93         Results from last 7 days   Lab Units 10/02/19  0638 10/01/19  2054 10/01/19  1818   POC GLUCOSE mg/dl 137 181* 124         Results from last 7 days   Lab Units 10/01/19  1222   LACTIC ACID mmol/L 0 5           * I Have Reviewed All Lab Data Listed Above  * Additional Pertinent Lab Tests Reviewed:  Ambrocio Cates Admission Reviewed    Imaging:    Imaging Reports Reviewed Today Include: all  Imaging Personally Reviewed by Myself Includes:  none    Recent Cultures (last 7 days):           Last 24 Hours Medication List:     Current Facility-Administered Medications:  acetaminophen 650 mg Oral Q6H PRN Zoe Redding PA-C    amLODIPine 10 mg Oral QAM Zoe Redding PA-C    amLODIPine 5 mg Oral QPM Zoe Redding PA-C    ARIPiprazole 30 mg Oral HS Zoe Redding PA-C    aspirin 81 mg Oral Daily Zoe Redding PA-C    bisacodyl 5 mg Oral Daily PRN Carla Mendez MD    busPIRone 5 mg Oral BID Zoe Redding PA-C    calcium carbonate 1,000 mg Oral Daily PRN Zoe Redding PA-C    cefTRIAXone 1,000 mg Intravenous Q24H Zoe Redding PA-C    cloNIDine 0 3 mg Oral Q8H [address] Zoe Redding PA-C    doxazosin 4 mg Oral HS Zoe Redding PA-C    DULoxetine 60 mg Oral BID Zoe Redding PA-C    ferrous sulfate 325 mg Oral Daily With Breakfast Zoe Redding PA-C    folic acid 6,590 mcg Oral Daily Zoe Redding PA-C    heparin (porcine) 5,000 Units Subcutaneous Q8H [facility] Zoe Redding PA-C    hydrALAZINE 50 mg Oral Q8H [facility]    insulin glargine 1 Units Subcutaneous HS Zoe Moregeorge, JOHN    insulin lispro 1-5 Units Subcutaneous HS Zoe Moregeorge, JOHN    insulin lispro 1-6 Units Subcutaneous TID AC Zoebetito Redding PA-C    levothyroxine 125 mcg Oral Early Morning Zoeenedina Redding PA-C    LORazepam 2 mg Oral TID PRN Zoe Moregeorge, JOHN    metoprolol tartrate 50 mg Oral Q12H [facility]    ondansetron 4 mg Intravenous Q6H PRN [facility] JOHN Redding    pravastatin 80 mg Oral Daily With [employer], JOHN    predniSONE 5 mg Oral Daily [facility]    rOPINIRole 0 25 mg Oral BID Zoe MoreJOHN vazquez    sertraline 200 mg Oral Daily Zoe MoreJOHN vazquez    sevelamer 800 mg Oral TID With Meals Zoeenedina Redding PA-C    sodium bicarbonate infusion 70 mL/hr Intravenous Continuous Zoe Redding PA-C Last Rate: 70 mL/hr (10/01/19 1650)   tacrolimus 2 mg Oral QPM Zoe Redding PA-C    tacrolimus 3 mg Oral QAM Zoe Redding PA-C      Today, Patient Was Seen By: Marylin Michaels PA-C    ** Please Note: Dictation voice to text software may have been used in the creation of this document   **

## 2019-10-02 NOTE — SOCIAL WORK
Pt is a less than 30 day readmission  Met with pt & explained Cm role  Pt lives alone in [address]st flr apt with 3 brenda  Was iPTA, on disability & drives  DME has cane, rw & shower chair  H/o SL VNA  Unsure name of rhb in past  Has h/o depression & anxiety treated with meds  Had IP tx years ago  H/o etoh in past  Uses [facility]  PCP Dr Mitzy CORBETT father Aurelia Quintero (c) [phone]  Emergency contact, mother Omar Santiago [phone]  Will have ride home  Patient/caregiver received discharge checklist  Content reviewed  Patient/caregiver encouraged to participate in discharge plan of care prior to discharge home  CM reviewed d/c planning process including the following: identifying help at home, patient preference for d/c planning needs, Discharge Lounge, [facility] Meds to Bed program, availability of treatment team to discuss questions or concerns patient and/or family may have regarding understanding medications and recognizing signs and symptoms once discharged  CM also encouraged patient to follow up with all recommended appointments after discharge  Patient advised of importance for patient and family to participate in managing patients medical well being

## 2019-10-02 NOTE — ASSESSMENT & PLAN NOTE
Lab Results   Component Value Date    HGBA1C 5 1 09/09/2019     Recent Labs     10/01/19  1818 10/01/19  2054 10/02/19  0638   POCGLU 124 181* 137     Blood Sugar Average: Last 72 hrs:  · Well controlled evidenced by A1C  · Patient takes toujeo 1 unit qhs - continue  · SSI, accu checks  (P) [de-identified]

## 2019-10-02 NOTE — UTILIZATION REVIEW
Initial Clinical Review    Admission: Date/Time/Statement: Inpatient Admission Orders (From admission, onward)     Ordered        10/01/19 1519  Inpatient Admission  Once                   Orders Placed This Encounter   Procedures    Inpatient Admission     Standing Status:   Standing     Number of Occurrences:   1     Order Specific Question:   Admitting Physician     Answer:   Creed Ang     Order Specific Question:   Level of Care     Answer:   Med Surg [16]     Order Specific Question:   Estimated length of stay     Answer:   More than 2 Midnights     Order Specific Question:   Certification     Answer:   I certify that inpatient services are medically necessary for this patient for a duration of greater than two midnights  See H&P and MD Progress Notes for additional information about the patient's course of treatment  ED Arrival Information     Expected Arrival Acuity Means of Arrival Escorted By Service Admission Type    - 10/1/2019 11:59 Urgent Walk-In Self Hospitalist Urgent    Arrival Complaint    urinary frequency        Chief Complaint   Patient presents with    Urinary Frequency     for past 2 days pt has had urinary frequency with burning has not noticed blood  tried  recent admit for UTI d/c on 9/16  nausea  giovanni fever and chills      Assessment/Plan:     53year old female presents to ed from nephrologist for evaluation and treatment of dysuria, frequency, chills and confusion  )[address]  Kidney transplant on taxrolimus, multiple myeloma ckd stage 4  ON arrival bun 38 and creatinine 2 7, urinalysis positive nitrites, bacteria and wbc  Urine culture obtained  Renal US concerning for transplant rejection  In ed treated with iv na bicarb in d 5% , iv zofran and iv antibiotic  Admit to inpatient medical surgical for urinary tract infection  Plan to consult infectious disease, follow cultures,  Continue iv antibiotics    Monitor creatinine until returns to baseline  1 9 to 2 0  Continue iv bicarb drip      ED Triage Vitals   10/01/19 1208 10/01/19 1208 10/01/19 1208 10/01/19 1208 10/01/19 1208   99 °F (37 2 °C) 66 20 (!) 173/72 97 %      Oral          Pain Score       7       10/02/19 100 kg (221 lb 5 5 oz)     Additional Vital Signs:       Date/Time  Temp  Pulse  Resp  BP  MAP (mmHg)  SpO2  O2 Device    10/02/19 0700  97 3 °F (36 3 °C)Abnormal   61  17  156/70    99 %  None (Room air)    10/02/19 05:31:22        157/62  94        10/01/19 22:14:45  98 °F (36 7 °C)  65    165/64  98  96 %      10/01/19 21:37:08        167/66  100        10/01/19 17:14:56  97 9 °F (36 6 °C)  65  16  170/68  102  95 %      10/01/19 1615    80  20  182/75Abnormal     95 %  None (Room air)    10/01/19 1600    68  20  198/75Abnormal     95 %  None (Room air)    10/01/19 1419    65  18  211/81Abnormal     97 %  None (Room air)    10/01/19 1305    64  18  178/74Abnormal     96 %  None (Room air)      Date/Time  Weight  Weight Method  Height   10/02/19 0548  100 kg (221 lb 5 5 oz)  Standing scale     10/01/19 17:14:56  99 3 kg (218 lb 14 7 oz)  Standing scale  5' 8" (1 727 m)   10/01/19 1208  101 kg (222 lb)  Stated             Pertinent Labs/Diagnostic Test Results:       Renal  US   Impression:    Arterial resistive indices within the transplant lower pole renal parenchyma and transplant renal artery measuring between 0 8 and 0 9, previously resistive indices measuring up to 0 8 in early September  Transplant renal artery and transplant renal vein are patent  Spectral analysis of the transplant renal artery does not suggest stenosis  Findings are more concerning for developing transplant rejection  No hydronephrosis  Simple renal cysts within both the left pelvic transplant kidney and right native kidney  Left native kidney not reliably identified,  known to be significantly atrophic          EKG  Rate 64   Normal sinus rhythm  Left axis deviation  Abnormal ECG  When compared with ECG of 02-FEB-2018 06:06,  Vent   rate has decreased BY  34 BPM    Results from last 7 days   Lab Units 10/01/19  1222   WBC Thousand/uL 8 58   HEMOGLOBIN g/dL 8 4*   HEMATOCRIT % 27 6*   PLATELETS Thousands/uL 168   NEUTROS ABS Thousands/µL 6 38         Results from last 7 days   Lab Units 10/02/19  0444 10/01/19  1222   SODIUM mmol/L 142 140   POTASSIUM mmol/L 4 5 4 6   CHLORIDE mmol/L 117* 118*   CO2 mmol/L 15* 15*   ANION GAP mmol/L 10 7   BUN mg/dL 36* 38*   CREATININE mg/dL 2 75* 2 70*   EGFR ml/min/1 73sq m 19 19   CALCIUM mg/dL 8 7 9 2   MAGNESIUM mg/dL 2 1  --    PHOSPHORUS mg/dL 4 4  --      Results from last 7 days   Lab Units 10/01/19  1222   AST U/L 8   ALT U/L 15   ALK PHOS U/L 119*   TOTAL PROTEIN g/dL 8 1   ALBUMIN g/dL 3 0*   TOTAL BILIRUBIN mg/dL 0 35     Results from last 7 days   Lab Units 10/02/19  1103 10/02/19  0638 10/01/19  2054 10/01/19  1818   POC GLUCOSE mg/dl 103 137 181* 124     Results from last 7 days   Lab Units 10/02/19  0444 10/01/19  1222   GLUCOSE RANDOM mg/dL 114 93       Results from last 7 days   Lab Units 10/01/19  1222   LACTIC ACID mmol/L 0 5       Results from last 7 days   Lab Units 10/01/19  1253   CLARITY UA  Cloudy   COLOR UA  Yellow   SPEC GRAV UA  1 015   PH UA  8 5*   GLUCOSE UA mg/dl Negative   KETONES UA mg/dl Negative   BLOOD UA  Negative   PROTEIN UA mg/dl 100 (2+)*   NITRITE UA  Positive*   BILIRUBIN UA  Negative   UROBILINOGEN UA E U /dl 0 2   LEUKOCYTES UA  Large*   WBC UA /hpf 30-50*   RBC UA /hpf None Seen   BACTERIA UA /hpf Innumerable*   EPITHELIAL CELLS WET PREP /hpf None Seen     Results from last 7 days   Lab Units 10/01/19  1253   URINE CULTURE  >100,000 cfu/ml Gram Negative Jerald*       ED Treatment:   Medication Administration from 10/01/2019 1159 to 10/01/2019 1709       Date/Time Order Dose Route     10/01/2019 1418 ceftriaxone (ROCEPHIN) 1 g/50 mL in dextrose IVPB 1,000 mg Intravenous     10/01/2019 1414 ondansetron (ZOFRAN) injection 4 mg 4 mg Intravenous     10/01/2019 1650 sodium bicarbonate 150 mEq in dextrose 5 % 1,000 mL infusion 70 mL/hr Intravenous        Past Medical History:   Diagnosis Date    Abnormal liver function test     Acute kidney injury ([facility] )     Acute on chronic congestive heart failure (HCC)     Allergic urticaria     Anemia     Cancer (HCC)     Multiple myeloma    Cervical dysplasia     Cholelithiasis     [facility] 9[facility]18/2017    Diabetes mellitus ([facility] )     Previous, controlled with diet    Diabetes mellitus with foot ulcer [facility]     Disease of thyroid gland     Encephalopathy     Hematuria     + leak est- secondary to UTIs/panc drainage    History of transfusion     Hyperkalemia     Hypertension     Iliotibial band syndrome     Lumbar radiculopathy     Multiple myeloma (HCC)     Multiple myeloma (HCC)     Night blindness     Nonrheumatic aortic (valve) insufficiency     Pneumonia     Renal disorder     Retinopathy     Seborrhea     Seizure [facility]     Shingles     Sinus tachycardia     B blocker - cardio echo stress test 02 normal/neg LE doppler 2/02 OK and 12/07    Status post simultaneous kidney and pancreas transplant ([facility] )     Toe amputation status     Trochanteric bursitis      Present on Admission:   Acquired hypothyroidism   Acute renal failure superimposed on stage 4 chronic kidney disease (HCC)   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 (HCC)   Multiple myeloma not having achieved remission ([facility] )   Essential hypertension   UTI (urinary tract infection)   (Resolved) Acute metabolic encephalopathy   Major depressive disorder, recurrent episode, in full remission ([facility]   Anemia      Admitting Diagnosis:     Urinary frequency [R35 0]  UTI (urinary tract infection) [N39 0]  Fatigue [R53 83]  Urinary incontinence [R32]  Kidney transplant recipient [Z94 0]  Chronic kidney disease, stage 4 (severe) ([facility] ) [N18 4]    Age/Sex: 53 y o  female     Admission Orders:    Bladder scan  PT OT eval and teat  Daily weight   Intake/output    acetaminophen 650 mg Oral Q6H PRN    [START ON 10/3/2019] amLODIPine 10 mg Oral QAM    amLODIPine 5 mg Oral QPM    ARIPiprazole 30 mg Oral HS    aspirin 81 mg Oral Daily    bisacodyl 5 mg Oral Daily PRN    busPIRone 5 mg Oral BID    calcium carbonate 1,000 mg Oral Daily PRN    cefTRIAXone 1,000 mg Intravenous Q24H    cloNIDine 0 3 mg Oral Q8H [address]    doxazosin 4 mg Oral HS    DULoxetine 60 mg Oral BID    ferrous sulfate 325 mg Oral Daily With Breakfast    folic acid 9,492 mcg Oral Daily    heparin (porcine) 5,000 Units Subcutaneous Q8H [address]    hydrALAZINE 50 mg Oral Q8H [address]    insulin glargine 1 Units Subcutaneous HS    insulin lispro 1-5 Units Subcutaneous HS    insulin lispro 1-6 Units Subcutaneous TID AC    levothyroxine 125 mcg Oral Early Morning    LORazepam 2 mg Oral TID PRN    metoprolol tartrate 50 mg Oral Q12H ARUNA    ondansetron 4 mg Intravenous Q6H PRN    pravastatin 80 mg Oral Daily With Dinner    predniSONE 5 mg Oral Daily    rOPINIRole 0 25 mg Oral BID    sertraline 200 mg Oral Daily    sevelamer 800 mg Oral TID With Meals    sodium bicarbonate infusion 70 mL/hr Intravenous Continuous Last Rate: 70 mL/hr (10/02/19 1134)   tacrolimus 2 mg Oral QPM    tacrolimus 3 mg Oral QAM        IP CONSULT TO NEPHROLOGY  IP CONSULT TO INFECTIOUS DISEASES  IP CONSULT TO CASE MANAGEMENT    Network Utilization Review Department  Phone: [phone]; Fax [phone]  [email or web address]  ATTENTION: Please call with any questions or concerns to [phone]  and carefully listen to the prompts so that you are directed to the right person  Send all requests for admission clinical reviews, approved or denied determinations and any other requests to fax [phone]   All voicemails are confidential

## 2019-10-02 NOTE — ASSESSMENT & PLAN NOTE
· Follows hematology outpatient (Dr Liz Sheriff)  · Treatment currently on hold given infection and renal function

## 2019-10-02 NOTE — PLAN OF CARE
Problem: PHYSICAL THERAPY ADULT  Goal: Performs mobility at highest level of function for planned discharge setting  See evaluation for individualized goals  Description  Treatment/Interventions: LE strengthening/ROM, Elevations, Therapeutic exercise, Endurance training          See flowsheet documentation for full assessment, interventions and recommendations  Note:   Prognosis: Good  Problem List: Decreased endurance, Impaired balance, Decreased strength, Decreased mobility  Assessment: Pt is 53 y o  female seen for high complexity PT evaluation s/p admission to [facility] on 10/01/19 for urinary incontinence and dysuria  Primary diagnosis is UTI  Patient just had a recent hospitalization 9/9/19-9/13/19 for another UTI  Comorbidities affecting pt's physical performance at time of assessment include: Acute renal failure, stage 4 CKD, Type 1 DM, Metabolic acidosis, multiple myeloma, status post kideny transplant, essential hypertension, and acquired hypothyroidism  At baseline patient is at independent for functional mobility using SPC, ADLs and IADLs  Patient lives alone in a 1st floor apartment with 3 JESSI  Per patient, she does not ambulate long distance 2/2 to SOB at baseline  Upon evaluation, patient required supervision for bed mobility and transfers  Patient ambulated 100 feet x 2 with RW  No LOB and is steady during ambulation trial  Patient required supervision and VCs for safety to ascend/descend 3 steps with bilateral handrail  Patient reported minimal SOB after stair management  and required standing rest break before returning to room  SpO2 after ambulation 98% on RA  Patient's mobility level and increased risk of falls is secondary to SOB, ROD, impaired ambulatory balance, generalized weakness, activity intolerance, and decreased endurance    Current clinical presentation is unstable/unpredictable seen in pt's presentation of  of ongoing medical management, increased reliance on assistance compared to PLOF, Use of RW vs SPC during ambulation, ROD with activity, and significant PMH  Pt to benefit from continued PT tx to address deficits as defined above and maximize level of functional independent mobility and consistency  From PT/mobility standpoint, recommendation at time of d/c would be home with outpatient PT pending progress in order to facilitate return to PLOF  Recommend continue use of RW upon discharge  End of session patient sitting EOB with all needs in reach, RN notified  Barriers to Discharge: Decreased caregiver support     Recommendation: Outpatient PT     PT - OK to Discharge: Yes(once medically stable)    See flowsheet documentation for full assessment

## 2019-10-02 NOTE — ASSESSMENT & PLAN NOTE
· Likely in setting of acute renal failure  · On oral bicarb as an outpatient - continue IV bicarb drip   · Nephrology following, appreciate input   · BMP in AM

## 2019-10-02 NOTE — ASSESSMENT & PLAN NOTE
· Pt reports to some confusion and lethargy associated with UTI    States, for instance, she could not recall her phone number  · Supportive care  · Treat UTI   · This has resolved

## 2019-10-02 NOTE — PHYSICAL THERAPY NOTE
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 ([facility] )    Essential hypertension    Anemia    Status post kidney transplant    Major depressive disorder, recurrent episode, in full remission ([facility] )    Multiple myeloma not having achieved remission ([facility] )    Acute renal failure superimposed on stage 4 chronic kidney disease ([facility] )    Metabolic acidosis       Past Medical History  Past Medical History:   Diagnosis Date    Abnormal liver function test     Acute kidney injury ([facility] )     Acute on chronic congestive heart failure (HCC)     Allergic urticaria     Anemia     Cancer (HCC)     Multiple myeloma    Cervical dysplasia     Cholelithiasis     Chronic diastolic (congestive) heart failure ([facility] ) 9/18/2017    Diabetes mellitus (HCC)     Previous, controlled with diet    Diabetes mellitus with foot ulcer ([facility] )     Disease of thyroid gland     Encephalopathy     Hematuria     + leak est- secondary to UTIs/panc drainage    History of transfusion     Hyperkalemia     Hypertension     Iliotibial band syndrome     Lumbar radiculopathy     Multiple myeloma (HCC)     Multiple myeloma ([facility] )     Night blindness     Nonrheumatic aortic (valve) insufficiency     Pneumonia     Renal disorder     Retinopathy     Seborrhea     Seizure ([facility] )     Shingles     Sinus tachycardia     B blocker - cardio echo stress test 02 normal/neg LE doppler 2/02 OK and 12/07    Status post simultaneous kidney and pancreas transplant ([facility] )     Toe amputation status     Trochanteric bursitis         Past Surgical History  Past Surgical History:   Procedure Laterality Date    CATARACT EXTRACTION      CHOLECYSTECTOMY      COLONOSCOPY      two polyps in the rectum removed and biopsied diverticulosis in the sigmoid colon, external hemorrhoiods- Dr Barfield    COMBINED KIDNEY-PANCREAS TRANSPLANT N/A     CT BONE MARROW BIOPSY AND ASPIRATION  4/17/2019    CYSTOSCOPY N/A 10/13/2016    Procedure: CYSTOSCOPY, retrograde pyelogram, biopsy of ureteral polyp; Surgeon: Roxi Bhatia MD;  [facility];  Service:    [facility] DILATION AND CURETTAGE OF UTERUS      ESOPHAGOGASTRODUODENOSCOPY N/A 11/20/2017    Procedure: ESOPHAGOGASTRODUODENOSCOPY (EGD); Surgeon: Vicki Shelton DO;  [facility]: [facility];   Service: Gastroenterology    EYE SURGERY      cataracts    FOOT AMPUTATION THROUGH METATARSAL Left     FOOT SURGERY Right     excision of metatarsal heads    HALLUX VALGUS CORRECTION Right     NEPHRECTOMY TRANSPLANTED ORGAN      PANCREATIC TRANSPLANT REMOVAL  1998        10/02/19 1020   Note Type   Note type Eval only   Pain Assessment   Pain Assessment No/denies pain   Pain Score No Pain   Home Living   Type of Home Apartment  (1st floor apartment with 2 JESSI)   Home Layout Stairs to enter with rails  (2 JESSI with bilateral handrails)   Bathroom Shower/Tub Tub only   Bathroom Toilet Standard   Bathroom Equipment Shower chair   Bathroom Accessibility Accessible   Home Equipment Cane;Walker   Prior Function   Level of [place] Independent with ADLs and functional mobility   Lives With Alone   Receives Help From Family   ADL Assistance Independent   IADLs Independent   Falls in the last 6 months 0   Vocational Retired   Comments  (+); uses [facility] for ambulation and uses RW once in a while    Restrictions/Precautions   Wells Jaime Bearing Precautions Per Order No   Braces or Orthoses Other (Comment)  (L orthotic shoe for mid foot amputation)   Other Precautions Fall Risk;Pain   General   Family/Caregiver Present Yes   Cognition   Overall Cognitive Status WFL   Orientation Level Oriented to person;Oriented to place;Oriented to situation;Oriented to time   Memory Within functional limits   Following Commands Follows one step commands without difficulty   Comments Pleasant and cooperative t/o session   RLE Assessment   RLE Assessment WFL  (Grossly, based on functional observation)   LLE Assessment   LLE Assessment WFL  (Grossly, based on functional observation)   Coordination   Movements are Fluid and Coordinated 1   Bed Mobility   Supine to Sit 5  Supervision   Additional Comments End of session, sitting EOB, RN notified; call bell in reach   Transfers   Sit to Stand 5  Supervision   Additional items Verbal cues   Stand to Sit 5  Supervision   Additional items Verbal cues   Additional Comments VCs for hand placement; performed with RW; no LOB   Ambulation/Elevation   Gait pattern Short stride; Foward flexed   Gait Assistance 5  Supervision   Additional items Assist x 1   Assistive Device Rolling walker   Distance 100 feet, 100 feet; Required standing rest break; Minimal SOB reported, Spo2 98% on RA after ambulation   Stair Management Assistance 5  Supervision   Additional items Assist x 1;Verbal cues   Stair Management Technique Two rails; Step to pattern   Number of Stairs 3  (mild SOB; standing rest break after stairs )   Balance   Static Sitting Good   Dynamic Sitting Fair +   Static Standing Fair   Dynamic Standing Fair -   Ambulatory Fair -   Endurance Deficit   Endurance Deficit Yes   Endurance Deficit Description SOB   Activity Tolerance   Activity Tolerance Patient tolerated treatment well   Nurse Made Aware Per MICHELLE hammer to see patient for PT evaluation and OOB activity   Assessment   Prognosis Good   Problem List Decreased endurance; Impaired balance;Decreased strength;Decreased mobility   Assessment Pt is 53 y o  female seen for high complexity PT evaluation s/p admission [facility]19 for urinary incontinence and dysuria  Primary diagnosis is UTI  Patient just had a recent hospitalization 9/9/19-9/13/19 for another UTI    Comorbidities affecting pt's physical performance at time of assessment include: Acute renal failure, stage 4 CKD, Type 1 DM, Metabolic acidosis, multiple myeloma, status post kideny transplant, essential hypertension, and acquired hypothyroidism  At baseline patient is at independent for functional mobility using SPC, ADLs and IADLs  Patient lives alone in a 1st floor apartment with 3 JESSI  Per patient, she does not ambulate long distance 2/2 to SOB at baseline  Upon evaluation, patient required supervision for bed mobility and transfers  Patient ambulated 100 feet x 2 with RW  No LOB and is steady during ambulation trial  Patient required supervision and VCs for safety to ascend/descend 3 steps with bilateral handrail  Patient reported minimal SOB after stair management  and required standing rest break before returning to room  SpO2 after ambulation 98% on RA  Patient's mobility level and increased risk of falls is secondary to SOB, ROD, impaired ambulatory balance, generalized weakness, activity intolerance, and decreased endurance  Current clinical presentation is unstable/unpredictable seen in pt's presentation of  of ongoing medical management, increased reliance on assistance compared to PLOF, Use of RW vs SPC during ambulation, ROD with activity, and significant PMH  Pt to benefit from continued PT tx to address deficits as defined above and maximize level of functional independent mobility and consistency  From PT/mobility standpoint, recommendation at time of d/c would be home with outpatient PT pending progress in order to facilitate return to PLOF  Recommend continue use of RW upon discharge  End of session patient sitting EOB with all needs in reach, RN notified      Barriers to Discharge Decreased caregiver support   Goals   Patient Goals To do outpatient PT    STG Expiration Date 10/12/19   Short Term Goal #1 In 10 days the patient will complete the following 1) Bed mobility: Patient will perform supine <> sit independently to decrease burden of care  2)Transfers: Patient will perform sit<>stand  transfer with LRAD at mod I level  3) ambulation: Patient will ambulate 250 feet using LRAD at mod I level  4) Patient will ascend/descend 3 steps with both handrail independently in order to enter her apartment building 5) Patient will improve dynamic standing balance from fair- to fair + in order to perform everyday activities  6) Patient will continue with ongoing rehab services for family education, DME, and D/C planning    Plan   Treatment/Interventions LE strengthening/ROM; Elevations; Therapeutic exercise; Endurance training   PT Frequency   (3-5x/wk)   Recommendation   Recommendation Outpatient PT   PT - OK to Discharge Yes  (once medically stable)   Barthel Index   Feeding 10   Bathing 0   Grooming Score 5   Dressing Score 10   Bladder Score 10   Bowels Score 10   Toilet Use Score 10   Transfers (Bed/Chair) Score 10   Mobility (Level Surface) Score 10   Stairs Score 10   Barthel Index Score 85       Shannan Em, ROXT, PT

## 2019-10-02 NOTE — RESTORATIVE TECHNICIAN NOTE
Restorative Specialist Mobility Note       Activity: Ambulate in barron(Pt was educated about activity and benefits  Pt was returned to room in bed with tray and call bell within reach  ,)     Assistive Device: Front wheel walker, Other (Comment)(HHA x 1)       Jenna Munguia

## 2019-10-02 NOTE — ASSESSMENT & PLAN NOTE
· Nephrology following, appreciate input  She is s/p kidney and pancreas transplant in 1998  · EDUARDO on CKD stage 4  Previous baseline Cr 1 9-2 1  Follows with Dr Jaden Paez outpatient  · Was recently admitted 9/13/19 with EDUARDO at that time, peak Cr that admission 4 4  Improved to 2 8 on discharge  · Today Cr 2 75   · Lasix continues to be held  · IV bicarb drip given metabolic acidosis   · Transplant kidney US: Arterial resistive indices within transplant lower pole renal parenchyma and transplant renal artery measuring between 0 8 and 0 9, previously resistive indices measuring up to 0 8 in early September  Transplant renal artery dose not suggest stenosis    Findings are more concerning for developing transplant rejection

## 2019-10-02 NOTE — CONSULTS
Consultation - Infectious Disease   Jaye Stark 53 y o  female MRN: [id number]  Unit/Bed#: -01 Encounter: [id number]      IMPRESSION & RECOMMENDATIONS:   Impression/Recommendations: This is a 53 y o  female, status post renal transplant, presented yesterday with symptomatic UTI and EDUARDO  Patient was admitted and is now on IV ceftriaxone  1  Symptomatic UTI  Objective findings for UTI are not impressive  However, given history of renal transplant and symptoms, agree with treatment for presumptive UTI  Patient has no fever or leukocytosis  She is systemically well, without evidence of sepsis or systemic toxicity  In recent admission in September, urine culture grew a pansensitive E coli  Continue IV ceftriaxone for now  Monitor urinary symptoms  Monitor suprapubic tenderness  Monitor temperature/WBC  Follow-up on pending urine culture  Follow-up on pending blood cultures  2  EDUARDO, superimposed on CKD stage 4  Ultrasound without obstruction of transplanted kidney  Ceftriaxone does not need dose adjustment  Monitor creatinine  3  Encephalopathy, POA  This is most likely secondary to a KI and possible UTI  Mental status is much improved today  No clinical signs of CNS infection  Monitor mental status  4  Status post distant kidney and pancreas transplantation  Patient is on stable anti rejection regimen  5  Multiple myeloma  Chemotherapy is currently on hold  Patient is not neutropenic  Previous hospitalization records reviewed in detail  Discussed with patient in detail regarding the above plan  Thank you for this consultation  We will follow along with you  HISTORY OF PRESENT ILLNESS:  Reason for Consult:  UTI     HPI: Jaye Stark is a 53 y o  female, with multiple medical problems including distant kidney/pancreas transplant, multiple myeloma and CKD, came to the ER yesterday with 2 day history of dysuria, frequency and suprapubic pain    She also became a little confused  She had chills but did not take her temperature  On presentation, patient did not have fever or leukocytosis  However, she had EDUARDO and abnormal UA  Given history of recurrent UTI, patient was admitted  IV ceftriaxone was started  We are asked to evaluate the patient  Patient states she feels a little better this morning  She has generalized fatigue but other symptoms have improved  In addition, she is less confused  No flank pain  Patient has history of frequent UTIs  She was last admitted here early September with E coli UTI  She did well with antibiotic course  REVIEW OF SYSTEMS:  A complete 12 point system-based review was done  Except for what is noted in HPI above, ROS of systems is otherwise negative      PAST MEDICAL HISTORY:  Past Medical History:   Diagnosis Date    Abnormal liver function test     Acute kidney injury ([address] )     Acute on chronic congestive heart failure (HCC)     Allergic urticaria     Anemia     Cancer (HCC)     Multiple myeloma    Cervical dysplasia     Cholelithiasis     Chronic diastolic (congestive) heart failure ([address] ) 9/18/2017    Diabetes mellitus ([address] )     Previous, controlled with diet    Diabetes mellitus with foot ulcer ([address] )     Disease of thyroid gland     Encephalopathy     Hematuria     + leak est- secondary to UTIs/panc drainage    History of transfusion     Hyperkalemia     Hypertension     Iliotibial band syndrome     Lumbar radiculopathy     Multiple myeloma (HCC)     Multiple myeloma ([address] )     Night blindness     Nonrheumatic aortic (valve) insufficiency     Pneumonia     Renal disorder     Retinopathy     Seborrhea     Seizure ([address] )     Shingles     Sinus tachycardia     B blocker - cardio echo stress test 02 normal/neg LE doppler 2/02 OK and 12/07    Status post simultaneous kidney and pancreas transplant ([address] )     Toe amputation status     Trochanteric bursitis      Past Surgical History:   Procedure Laterality Date    CATARACT EXTRACTION      CHOLECYSTECTOMY      COLONOSCOPY      two polyps in the rectum removed and biopsied diverticulosis in the sigmoid colon, external hemorrhoiods- Dr Barfield    COMBINED KIDNEY-PANCREAS TRANSPLANT N/A     CT BONE MARROW BIOPSY AND ASPIRATION  2019    CYSTOSCOPY N/A 10/13/2016    Procedure: CYSTOSCOPY, retrograde pyelogram, biopsy of ureteral polyp; Surgeon: Tonja Espinoza MD;  [facility];  Service:    [facility] DILATION AND CURETTAGE OF UTERUS      ESOPHAGOGASTRODUODENOSCOPY N/A 2017    Procedure: ESOPHAGOGASTRODUODENOSCOPY (EGD); Surgeon: Nydia Piedra DO;  [facility]; Service: Gastroenterology    EYE SURGERY      cataracts    FOOT AMPUTATION THROUGH METATARSAL Left     FOOT SURGERY Right     excision of metatarsal heads    HALLUX VALGUS CORRECTION Right     NEPHRECTOMY TRANSPLANTED ORGAN      PANCREATIC TRANSPLANT REMOVAL       Problem list reviewed  FAMILY HISTORY:  Non-contributory    SOCIAL HISTORY:  Social History     Substance and Sexual Activity   Alcohol Use Never    Frequency: Never    Binge frequency: Never    Comment: (history)     Social History     Substance and Sexual Activity   Drug Use Never    Types: Hydrocodone    Comment: Past cocaine use many years ago - no current use     Social History     Tobacco Use   Smoking Status Former Smoker    Last attempt to quit: 2012    Years since quittin 4   Smokeless Tobacco Never Used       ALLERGIES:  Allergies   Allergen Reactions    Ixazomib Rash     Ninlaro    Revlimid [Lenalidomide] Rash    Cefadroxil     Latex Rash    Morphine Other (See Comments)     Other reaction(s): Other (See Comments)  projectile vomiting    Morphine And Related GI Intolerance    Myrbetriq [Mirabegron] Hives    Penicillins Hives     Other reaction(s):  Other (See Comments)  Respiratory Distress,hives  Tolerates cefazolin       MEDICATIONS:  All current active medications have been reviewed  Patient is currently on IV ceftriaxone  PHYSICAL EXAM:  Vitals:  Temp:  [97 3 °F (36 3 °C)-99 °F (37 2 °C)] 97 3 °F (36 3 °C)  HR:  [61-80] 61  Resp:  [16-20] 17  BP: (156-211)/(62-81) 156/70  SpO2:  [95 %-99 %] 99 %  Temp (24hrs), Av 1 °F (36 7 °C), Min:97 3 °F (36 3 °C), Max:99 °F (37 2 °C)  Current: Temperature: (!) 97 3 °F (36 3 °C)     Physical Exam:  General:  Obese, not acutely ill, comfortable, in no acute distress  Awake, alert and oriented x 3, but a little forgetful  Eyes:  Conjunctive clear with no hemorrhages or effusions  Oropharynx:  No ulcers, no lesions, pharynx benign, no tonsillitis  Neck:  Supple, no lymphadenopathy, no mass, nontender  Lungs:  Expansion symmetric, no rales, no wheezing, no accessory muscle use  Cardiac:  Regular rate and rhythm, normal S1, normal S2, no murmurs  Abdomen:  Soft, nondistended, mild suprapubic tenderness, no flank tenderness, no HSM  Extremities:  Trace edema, no erythema, nontender  No ulcers  Skin:  No rashes, no ulcers  Neurological:  Moves all four extremities spontaneously, sensation grossly intact    LABS, IMAGING, & OTHER STUDIES:  Lab Results:  I have personally reviewed pertinent labs  Results from last 7 days   Lab Units 10/02/19  0444 10/01/19  1222   POTASSIUM mmol/L 4 5 4 6   CHLORIDE mmol/L 117* 118*   CO2 mmol/L 15* 15*   BUN mg/dL 36* 38*   CREATININE mg/dL 2 75* 2 70*   EGFR ml/min/1 73sq m 19 19   CALCIUM mg/dL 8 7 9 2   AST U/L  --  8   ALT U/L  --  15   ALK PHOS U/L  --  119*     Results from last 7 days   Lab Units 10/01/19  1222   WBC Thousand/uL 8 58   HEMOGLOBIN g/dL 8 4*   PLATELETS Thousands/uL 168           Imaging Studies:   I have personally reviewed pertinent imaging study reports and images in PACS  Renal ultrasound reviewed  No hydronephrosis of transplant kidney  EKG, Pathology, and Other Studies:   I have personally reviewed pertinent reports

## 2019-10-03 ENCOUNTER — TELEPHONE (OUTPATIENT)
Dept: OTHER | Facility: HOSPITAL | Age: 54
End: 2019-10-03

## 2019-10-03 LAB
ANION GAP SERPL CALCULATED.3IONS-SCNC: 10 MMOL/L (ref 4–13)
BACTERIA UR CULT: ABNORMAL
BASOPHILS # BLD AUTO: 0.11 THOUSANDS/ΜL (ref 0–0.1)
BASOPHILS NFR BLD AUTO: 2 % (ref 0–1)
BUN SERPL-MCNC: 40 MG/DL (ref 5–25)
CALCIUM SERPL-MCNC: 8.3 MG/DL (ref 8.3–10.1)
CHLORIDE SERPL-SCNC: 111 MMOL/L (ref 100–108)
CO2 SERPL-SCNC: 18 MMOL/L (ref 21–32)
CREAT SERPL-MCNC: 2.54 MG/DL (ref 0.6–1.3)
EOSINOPHIL # BLD AUTO: 0.56 THOUSAND/ΜL (ref 0–0.61)
EOSINOPHIL NFR BLD AUTO: 9 % (ref 0–6)
ERYTHROCYTE [DISTWIDTH] IN BLOOD BY AUTOMATED COUNT: 17.4 % (ref 11.6–15.1)
GFR SERPL CREATININE-BSD FRML MDRD: 21 ML/MIN/1.73SQ M
GLUCOSE SERPL-MCNC: 103 MG/DL (ref 65–140)
GLUCOSE SERPL-MCNC: 115 MG/DL (ref 65–140)
GLUCOSE SERPL-MCNC: 138 MG/DL (ref 65–140)
GLUCOSE SERPL-MCNC: 138 MG/DL (ref 65–140)
GLUCOSE SERPL-MCNC: 149 MG/DL (ref 65–140)
HCT VFR BLD AUTO: 24.8 % (ref 34.8–46.1)
HGB BLD-MCNC: 7.4 G/DL (ref 11.5–15.4)
IMM GRANULOCYTES # BLD AUTO: 0.07 THOUSAND/UL (ref 0–0.2)
IMM GRANULOCYTES NFR BLD AUTO: 1 % (ref 0–2)
LYMPHOCYTES # BLD AUTO: 1.68 THOUSANDS/ΜL (ref 0.6–4.47)
LYMPHOCYTES NFR BLD AUTO: 26 % (ref 14–44)
MCH RBC QN AUTO: 28.8 PG (ref 26.8–34.3)
MCHC RBC AUTO-ENTMCNC: 29.8 G/DL (ref 31.4–37.4)
MCV RBC AUTO: 97 FL (ref 82–98)
MONOCYTES # BLD AUTO: 0.57 THOUSAND/ΜL (ref 0.17–1.22)
MONOCYTES NFR BLD AUTO: 9 % (ref 4–12)
NEUTROPHILS # BLD AUTO: 3.54 THOUSANDS/ΜL (ref 1.85–7.62)
NEUTS SEG NFR BLD AUTO: 53 % (ref 43–75)
NRBC BLD AUTO-RTO: 0 /100 WBCS
PLATELET # BLD AUTO: 161 THOUSANDS/UL (ref 149–390)
PMV BLD AUTO: 13 FL (ref 8.9–12.7)
POTASSIUM SERPL-SCNC: 4 MMOL/L (ref 3.5–5.3)
RBC # BLD AUTO: 2.57 MILLION/UL (ref 3.81–5.12)
SODIUM SERPL-SCNC: 139 MMOL/L (ref 136–145)
TACROLIMUS BLD-MCNC: 3.6 NG/ML (ref 2–20)
WBC # BLD AUTO: 6.53 THOUSAND/UL (ref 4.31–10.16)

## 2019-10-03 PROCEDURE — G8988 SELF CARE GOAL STATUS: HCPCS

## 2019-10-03 PROCEDURE — 97116 GAIT TRAINING THERAPY: CPT

## 2019-10-03 PROCEDURE — G8987 SELF CARE CURRENT STATUS: HCPCS

## 2019-10-03 PROCEDURE — 97110 THERAPEUTIC EXERCISES: CPT

## 2019-10-03 PROCEDURE — 80048 BASIC METABOLIC PNL TOTAL CA: CPT | Performed by: PHYSICIAN ASSISTANT

## 2019-10-03 PROCEDURE — 99232 SBSQ HOSP IP/OBS MODERATE 35: CPT | Performed by: PHYSICIAN ASSISTANT

## 2019-10-03 PROCEDURE — 99232 SBSQ HOSP IP/OBS MODERATE 35: CPT | Performed by: INTERNAL MEDICINE

## 2019-10-03 PROCEDURE — 99233 SBSQ HOSP IP/OBS HIGH 50: CPT | Performed by: INTERNAL MEDICINE

## 2019-10-03 PROCEDURE — 85025 COMPLETE CBC W/AUTO DIFF WBC: CPT | Performed by: PHYSICIAN ASSISTANT

## 2019-10-03 PROCEDURE — 99222 1ST HOSP IP/OBS MODERATE 55: CPT | Performed by: NURSE PRACTITIONER

## 2019-10-03 PROCEDURE — 82948 REAGENT STRIP/BLOOD GLUCOSE: CPT

## 2019-10-03 PROCEDURE — 97166 OT EVAL MOD COMPLEX 45 MIN: CPT

## 2019-10-03 RX ORDER — CEFAZOLIN SODIUM 1 G/50ML
1000 SOLUTION INTRAVENOUS EVERY 12 HOURS
Status: DISCONTINUED | OUTPATIENT
Start: 2019-10-04 | End: 2019-10-04

## 2019-10-03 RX ORDER — TACROLIMUS 1 MG/1
3 CAPSULE ORAL EVERY EVENING
Status: DISCONTINUED | OUTPATIENT
Start: 2019-10-03 | End: 2019-10-04 | Stop reason: HOSPADM

## 2019-10-03 RX ADMIN — TACROLIMUS 3 MG: 1 CAPSULE ORAL at 08:02

## 2019-10-03 RX ADMIN — PRAVASTATIN SODIUM 80 MG: 80 TABLET ORAL at 17:12

## 2019-10-03 RX ADMIN — CLONIDINE HYDROCHLORIDE 0.3 MG: 0.1 TABLET ORAL at 05:16

## 2019-10-03 RX ADMIN — DULOXETINE HYDROCHLORIDE 60 MG: 60 CAPSULE, DELAYED RELEASE ORAL at 17:12

## 2019-10-03 RX ADMIN — ROPINIROLE HYDROCHLORIDE 0.25 MG: 0.25 TABLET, FILM COATED ORAL at 08:05

## 2019-10-03 RX ADMIN — LEVOTHYROXINE SODIUM 125 MCG: 125 TABLET ORAL at 05:16

## 2019-10-03 RX ADMIN — CEFTRIAXONE SODIUM 1000 MG: 10 INJECTION, POWDER, FOR SOLUTION INTRAVENOUS at 13:42

## 2019-10-03 RX ADMIN — DULOXETINE HYDROCHLORIDE 60 MG: 60 CAPSULE, DELAYED RELEASE ORAL at 08:01

## 2019-10-03 RX ADMIN — ARIPIPRAZOLE 30 MG: 10 TABLET ORAL at 21:07

## 2019-10-03 RX ADMIN — SODIUM BICARBONATE 70 ML/HR: 84 INJECTION, SOLUTION INTRAVENOUS at 21:08

## 2019-10-03 RX ADMIN — BUSPIRONE HYDROCHLORIDE 5 MG: 5 TABLET ORAL at 17:12

## 2019-10-03 RX ADMIN — BISACODYL 5 MG: 5 TABLET, COATED ORAL at 19:41

## 2019-10-03 RX ADMIN — AMLODIPINE BESYLATE 10 MG: 10 TABLET ORAL at 08:05

## 2019-10-03 RX ADMIN — ASPIRIN 81 MG 81 MG: 81 TABLET ORAL at 08:01

## 2019-10-03 RX ADMIN — SEVELAMER HYDROCHLORIDE 800 MG: 800 TABLET, FILM COATED PARENTERAL at 11:35

## 2019-10-03 RX ADMIN — CLONIDINE HYDROCHLORIDE 0.3 MG: 0.1 TABLET ORAL at 21:15

## 2019-10-03 RX ADMIN — HYDRALAZINE HYDROCHLORIDE 50 MG: 50 TABLET ORAL at 21:14

## 2019-10-03 RX ADMIN — HEPARIN SODIUM 5000 UNITS: 5000 INJECTION INTRAVENOUS; SUBCUTANEOUS at 05:18

## 2019-10-03 RX ADMIN — SEVELAMER HYDROCHLORIDE 800 MG: 800 TABLET, FILM COATED PARENTERAL at 07:57

## 2019-10-03 RX ADMIN — METOPROLOL TARTRATE 50 MG: 50 TABLET, FILM COATED ORAL at 08:05

## 2019-10-03 RX ADMIN — CLONIDINE HYDROCHLORIDE 0.3 MG: 0.1 TABLET ORAL at 13:40

## 2019-10-03 RX ADMIN — SERTRALINE HYDROCHLORIDE 200 MG: 100 TABLET ORAL at 08:01

## 2019-10-03 RX ADMIN — DOXAZOSIN 4 MG: 4 TABLET ORAL at 21:14

## 2019-10-03 RX ADMIN — HEPARIN SODIUM 5000 UNITS: 5000 INJECTION INTRAVENOUS; SUBCUTANEOUS at 13:41

## 2019-10-03 RX ADMIN — HYDRALAZINE HYDROCHLORIDE 50 MG: 50 TABLET ORAL at 05:17

## 2019-10-03 RX ADMIN — BUSPIRONE HYDROCHLORIDE 5 MG: 5 TABLET ORAL at 08:01

## 2019-10-03 RX ADMIN — AMLODIPINE BESYLATE 5 MG: 5 TABLET ORAL at 17:12

## 2019-10-03 RX ADMIN — TACROLIMUS 3 MG: 1 CAPSULE ORAL at 19:00

## 2019-10-03 RX ADMIN — INSULIN GLARGINE 1 UNITS: 100 INJECTION, SOLUTION SUBCUTANEOUS at 21:08

## 2019-10-03 RX ADMIN — SEVELAMER HYDROCHLORIDE 800 MG: 800 TABLET, FILM COATED PARENTERAL at 17:12

## 2019-10-03 RX ADMIN — METOPROLOL TARTRATE 50 MG: 50 TABLET, FILM COATED ORAL at 21:15

## 2019-10-03 RX ADMIN — SODIUM BICARBONATE 70 ML/HR: 84 INJECTION, SOLUTION INTRAVENOUS at 04:45

## 2019-10-03 RX ADMIN — HYDRALAZINE HYDROCHLORIDE 50 MG: 50 TABLET ORAL at 13:40

## 2019-10-03 RX ADMIN — HEPARIN SODIUM 5000 UNITS: 5000 INJECTION INTRAVENOUS; SUBCUTANEOUS at 21:08

## 2019-10-03 RX ADMIN — LORAZEPAM 2 MG: 1 TABLET ORAL at 13:40

## 2019-10-03 RX ADMIN — FERROUS SULFATE TAB 325 MG (65 MG ELEMENTAL FE) 325 MG: 325 (65 FE) TAB at 07:57

## 2019-10-03 RX ADMIN — PREDNISONE 5 MG: 5 TABLET ORAL at 08:05

## 2019-10-03 RX ADMIN — ROPINIROLE HYDROCHLORIDE 0.25 MG: 0.25 TABLET, FILM COATED ORAL at 17:12

## 2019-10-03 RX ADMIN — FOLIC ACID 1000 MCG: 1 TABLET ORAL at 08:01

## 2019-10-03 NOTE — SOCIAL WORK
Pt reviewed in [facility] care rounds  Pt is stable for d/c tomorrow  Pt has OP PT script in the folder, provider aware

## 2019-10-03 NOTE — OCCUPATIONAL THERAPY NOTE
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 ([facility] )    Essential hypertension    Anemia    Status post kidney transplant    Major depressive disorder, recurrent episode, in full remission ([facility] )    Multiple myeloma not having achieved remission ([facility] [facility] )    Acute renal failure superimposed on stage 4 chronic kidney disease ([address] )    Metabolic acidosis    Past Medical History  Past Medical History:   Diagnosis Date    Abnormal liver function test     Acute kidney injury ([facility] )     Acute on chronic congestive heart failure (HCC)     Allergic urticaria     Anemia     Cancer (HCC)     Multiple myeloma    Cervical dysplasia     Cholelithiasis     Chronic diastolic (congestive) heart failure ([facility] ) 9/18/2017    Diabetes mellitus (HCC)     Previous, controlled with diet    Diabetes mellitus with foot ulcer ([address] )     Disease of thyroid gland     Encephalopathy     Hematuria     + leak est- secondary to UTIs/panc drainage    History of transfusion     Hyperkalemia     Hypertension     Iliotibial band syndrome     Lumbar radiculopathy     Multiple myeloma (HCC)     Multiple myeloma (HCC)     Night blindness     Nonrheumatic aortic (valve) insufficiency     Pneumonia     Renal disorder     Retinopathy     Seborrhea     Seizure ([address] )     Shingles     Sinus tachycardia     B blocker - cardio echo stress test 02 normal/neg LE doppler 2/02 OK and 12/07    Status post simultaneous kidney and pancreas transplant ([facility] [address] )     Toe amputation status     Trochanteric bursitis      Past Surgical History  Past Surgical History:   Procedure Laterality Date    CATARACT EXTRACTION      CHOLECYSTECTOMY      COLONOSCOPY      two polyps in the rectum removed and biopsied diverticulosis in the sigmoid colon, external hemorrhoiods- Dr Barfield    COMBINED KIDNEY-PANCREAS TRANSPLANT N/A     CT BONE MARROW BIOPSY AND ASPIRATION  4/17/2019    CYSTOSCOPY N/A 10/13/2016    Procedure: CYSTOSCOPY, retrograde pyelogram, biopsy of ureteral polyp; Surgeon: Fern Soto MD;  [facility];  Service:    Monalisa Nicole DILATION AND CURETTAGE OF UTERUS      ESOPHAGOGASTRODUODENOSCOPY N/A 11/20/2017    Procedure: ESOPHAGOGASTRODUODENOSCOPY (EGD); Surgeon: [facility], DO;  Location: [facility]; Service: Gastroenterology    EYE SURGERY      cataracts    FOOT AMPUTATION THROUGH METATARSAL Left     FOOT SURGERY Right     excision of metatarsal heads    HALLUX VALGUS CORRECTION Right     NEPHRECTOMY TRANSPLANTED ORGAN      PANCREATIC TRANSPLANT REMOVAL  1998        10/03/19 0946   Note Type   Note type Eval only   Restrictions/Precautions   Weight Bearing Precautions Per Order No   Braces or Orthoses   (b/l orthotic shoes 2* toe amputations)   Other Precautions Cognitive;Multiple lines; Fall Risk   Pain Assessment   Pain Assessment No/denies pain   Pain Score No Pain   Home Living   Type of Home Apartment  (3STE)   Home Layout One level   Bathroom Shower/Tub Tub/shower unit   Bathroom Toilet Standard   Bathroom Equipment Shower chair   Home Equipment Walker;Cane   Additional Comments Pt resides alone in a first floor apartment with 3STE  Pt's parents live close and are able to provide assist as necessary  Pt reports that she is in the processes of looking to move to [place]  Prior Function   Level of [place] Independent with ADLs and functional mobility   Lives With Alone   Receives Help From Family   ADL Assistance Independent   IADLs Independent   Falls in the last 6 months 0   Vocational On disability   Lifestyle   Autonomy Pt reports that she is typically completely I with ADLs, IADLs, and functional mobility with use of spc    However, on pt's "bad days" she requires PRN A with IADLs (parents assist) and will use the rw  Pt is a  and manages her medications   Reciprocal Relationships parents   Service to Others on disability   Intrinsic Gratification Pt reports that she likes to do crafts and go shopping  Pt expresses that she has been considering moving to [place] because her friends and family have been moving away (sister just moved to [place]) and she doesn't have anyone to spend time with  Psychosocial   Psychosocial (WDL) X   Patient Behaviors/Mood   (WDL until presentation of cog assessment)   Subjective   Subjective "Oh no I had an occupational therapist see me last time I was here and she made me do that test and it was really hard  I honestly don't think I'm up for it today "  Per chart, pt completed MoCA ~1 month ago and scored 20/30  Pt reports that she doesn't think that her thinking had fully recovered from UTI when the MoCA was last administered  Pt does report that she feels that her thinking is not yet at her baseline  Pt agreeable to completing formal cognitive assessment tomorrow   ADL   Eating Assistance 7  Independent   Grooming Assistance 7  Independent   UB Bathing Assistance 5  Supervision/Setup   LB Bathing Assistance 5  Supervision/Setup   UB Dressing Assistance 5  Supervision/Setup   LB Dressing Assistance 5  Supervision/Setup   LB Dressing Deficit Don/doff R shoe;Don/doff L shoe   Toileting Assistance  5  Supervision/Setup   Bed Mobility   Supine to Sit 5  Supervision   Sit to Supine 5  Supervision   Transfers   Sit to Stand 5  Supervision   Stand to Sit 5  Supervision   Functional Mobility   Functional Mobility 5  Supervision   Additional Comments Pt ambulated in hallway with supervision and assist with line management  Minimally unsteady but no LOB    Poor endurance   Additional items Rolling walker   Balance   Static Sitting Good   Dynamic Sitting Fair +   Static Standing Fair   Dynamic Standing Fair -   Ambulatory Fair -   Activity Tolerance   Activity Tolerance Patient tolerated treatment well   Nurse Made Aware Pt cleared by MICHELLE Paulson for OT evaluation and OOB mobility   RUE Assessment   RUE Assessment WFL   LUE Assessment   LUE Assessment WFL   Hand Function   Gross Motor Coordination Functional   Vision-Basic Assessment   Current Vision Wears glasses only for reading   Vision - Complex Assessment   Ocular Range of Motion [facility]   Perception   Inattention/Neglect Appears intact   Cognition   Arousal/Participation Alert; Cooperative   Attention Attends with cues to redirect   Orientation Level Oriented X4   Memory Decreased short term memory   Following Commands Follows one step commands with increased time or repetition   Comments Pt is pleasant and cooperative throughout evaluation  Pt reports that she does not feel that her thinking is at her baseline, especially her memory  Pt requires increased processing time  Assessment   Limitation Decreased cognition;Decreased endurance   Prognosis Good   Assessment Pt is a 53year old female seen for initial OT evaluation s/p admission to [facility] 10/1/19 with altered mental status  Pt dx'd with recurrent UTI  Additional active problems include: acute renal failure superimposed on CKD, DM, metabolic acidosis, multiple myeloma, major depressive disorder, s/p kidney transplant, HTN, hypothyroidism  Pt with active OT orders and cleared by MICHELLE Paulson for OT evaluation and OOB mobility  Pt resides alone in a 1st floor apartment with 3 JESSI  Pt reports being mostly I with ADLs, IADLs, and functional mobility with primary use of spc  However, on her "bad days" pt requires PRN assist from parents for IADLs and uses a rw  Pt is a   Pt is currently functioning at/close to her functional baseline, and only requires 1 additional OT session for formal cognitive assessment  From an OT standpoint, recommend home with family support and OP OT for fitness to drive upon d/c    Pt is to continue to benefit from skilled occupational therapy services while in the hospital to maximize functioning and independence with daily activities  See below for OT goals to be addressed during pt's POC  Goals   Patient Goals to feel better and to wait until tomorrow to do cognitive assessment   Plan   Treatment Interventions Cognitive reorientation;Continued evaluation   Goal Expiration Date 10/13/19   OT Treatment Day 1   OT Frequency   (1x for formal cognitive assessment)   Recommendation   OT Discharge Recommendation Outpatient OT  (fitness to drive)   OT - OK to Discharge No  (pending formal cognitive assessment)   Barthel Index   Feeding 10   Bathing 0   Grooming Score 5   Dressing Score 10   Bladder Score 10   Bowels Score 10   Toilet Use Score 10   Transfers (Bed/Chair) Score 10   Mobility (Level Surface) Score 10   Stairs Score 0  (not assessed)   Barthel Index Score 75     Goals:  Pt will be attentive for 100% of formal cognitive assessment for safe discharge/planning      TANJA Santos, OTR/L

## 2019-10-03 NOTE — ASSESSMENT & PLAN NOTE
· Kidney and pancreas transplant in 1998  · Transplant kidney US: Arterial resistive indices within transplant lower pole renal parenchyma and transplant renal artery measuring between 0 8 and 0 9, previously resistive indices measuring up to 0 8 in early September  Transplant renal artery dose not suggest stenosis    Findings are more concerning for developing transplant rejection   · Nephrology following, appreciate input  · Continue prednisone and Prograf - level normal

## 2019-10-03 NOTE — CONSULTS
UROLOGY CONSULTATION NOTE     Patient Identifiers: Cathye Babinski (MRN [id number])  Service Requesting Consultation: Octavio Carroll MD    Service Providing Consultation:  Urology, Mikhail Angela, [address] [place] [address]    Date of Service: 10/3/2019  Consults    Reason for Consultation: UTI    ASSESSMENT:     53 y o  old female with  history of  long-standing diabetes, end-stage renal disease, status post renal--pancreatic transplantation, chronic immunosuppression and current chemotherapy for treatment of multiple myeloma; admitted for E coli UTI  There are no overt causes for UTI on imaging including stones, fluid collection or obstruction  PLAN:   Continue medical optimization, symptom management and antibiotics  Encourage oral fluid hydration  Patient has acknowledged poor fluid intake  Nursing staff to monitor PVRs   Recommend urinary anti sepsis  Unfortunately, these interventions alone may not mitigate risk for UTI due to immunosuppression  For completeness of workup, recommend further outpatient study,? Cystoscopy and retrograde pyelography to discern absence or presence of reflux  Cannot be performed until patient is infection free  Service will contact patient/caregiver with hospital follow-up appointment date and time  Emergent  surgical intervention not indicated  History of Present Illness: Cathye Babinski is a 53 y o  old unfortunate female for significant medical and surgical complexity, including: type 1 diabetes, end-stage renal disease, status post renal and pancreatic transplantation, prior failed transplant, multiple myeloma with chemotherapy on hold secondary to intolerance, metatarsal amputation; admitted for UTI after failure of outpatient antibiotics  Patient denies any recent urologic evaluation  But acknowledges remote history of urologic evaluation and consultation against background of recurrent UTI    Patient's reports more recently she has declined urologic intervention stating, Wade Pereira stopped going to urologist because they were not able to do anything for me    Review of electronic medical record demonstrates at least 3--4 UTIs per year  Furthermore, she was remotely known to [facility] urology and was started on topical estrogen  After 2017, no further urologic related documentation exist on record  She reports a several day history of suprapubic pain, dysuria and urinary frequency without gross hematuria or flank pain  Patient feels she is emptying her bladder adequately  She is afebrile, hemodynamically stable and without leukocytosis  Creatinine is 2 5  with baseline of approximately 2 1  Transplant ultrasound is negative for suspicious masses or lesion and demonstrates bilateral native renal atrophy  Allograft is negative for hydronephrosis or shadowing calculi  Bladder is nondistended  Blood cultures negative  Urine culture however positive for E coli; resistant to ampicillin fluoroquinolones, tetracyclines and Macrodantin  Urologic consultation is requested for further management recommendations        Past Medical, Past Surgical History:     Past Medical History:   Diagnosis Date    Abnormal liver function test     Acute kidney injury ([address] )     Acute on chronic congestive heart failure (HCC)     Allergic urticaria     Anemia     Cancer (HCC)     Multiple myeloma    Cervical dysplasia     Cholelithiasis     Chronic diastolic (congestive) heart failure ([address] ) 9/18/2017    Diabetes mellitus ([address] )     Previous, controlled with diet    Diabetes mellitus with foot ulcer ([address] )     Disease of thyroid gland     Encephalopathy     Hematuria     + leak est- secondary to UTIs/panc drainage    History of transfusion     Hyperkalemia     Hypertension     Iliotibial band syndrome     Lumbar radiculopathy     Multiple myeloma (HCC)     Multiple myeloma ([address] )     Night blindness     Nonrheumatic aortic (valve) insufficiency     Pneumonia     Renal disorder     Retinopathy     Seborrhea     Seiz[facility] )     Shingles     Sinus tachycardia     B blocker - cardio echo stress test 02 normal/neg LE doppler 2/02 OK and 12/07    Status post simultaneous kidney and pancreas transplant ([facility] [facility] )     Toe amputation status     Trochanteric bursitis    :    Past Surgical History:   Procedure Laterality Date    CATARACT EXTRACTION      CHOLECYSTECTOMY      COLONOSCOPY      two polyps in the rectum removed and biopsied diverticulosis in the sigmoid colon, external hemorrhoiods- Dr Barfield    COMBINED KIDNEY-PANCREAS TRANSPLANT N/A     CT BONE MARROW BIOPSY AND ASPIRATION  4/17/2019    CYSTOSCOPY N/A 10/13/2016    Procedure: CYSTOSCOPY, retrograde pyelogram, biopsy of ureteral polyp; Surgeon: Bela Martinez MD;  [facility];  Service:    Ramana Moose DILATION AND CURETTAGE OF UTERUS      ESOPHAGOGASTRODUODENOSCOPY N/A 11/20/2017    Procedure: ESOPHAGOGASTRODUODENOSCOPY (EGD); Surgeon: Teresa Bobo DO;  [facility]; Service: Gastroenterology    EYE SURGERY      cataracts    FOOT AMPUTATION THROUGH METATARSAL Left     FOOT SURGERY Right     excision of metatarsal heads    HALLUX VALGUS CORRECTION Right     NEPHRECTOMY TRANSPLANTED ORGAN      PANCREATIC TRANSPLANT REMOVAL  1998   :     Medications, Allergies:     Current Facility-Administered Medications   Medication Dose Route Frequency    acetaminophen (TYLENOL) tablet 650 mg  650 mg Oral Q6H PRN    amLODIPine (NORVASC) tablet 10 mg  10 mg Oral QAM    amLODIPine (NORVASC) tablet 5 mg  5 mg Oral QPM    ARIPiprazole (ABILIFY) tablet 30 mg  30 mg Oral HS    aspirin chewable tablet 81 mg  81 mg Oral Daily    bisacodyl (DULCOLAX) EC tablet 5 mg  5 mg Oral Daily PRN    busPIRone (BUSPAR) tablet 5 mg  5 mg Oral BID    calcium carbonate (TUMS) chewable tablet 1,000 mg  1,000 mg Oral Daily PRN    cefTRIAXone (ROCEPHIN) 1,000 mg in dextrose 5 % 50 mL IVPB  1,000 mg Intravenous Q24H    cloNIDine (CATAPRES) tablet 0 3 mg  0 3 mg Oral Q8H [address]    doxazosin (CARDURA) tablet 4 mg  4 mg Oral HS    DULoxetine (CYMBALTA) delayed release capsule 60 mg  60 mg Oral BID    ferrous sulfate tablet 325 mg  325 mg Oral Daily With Breakfast    folic acid (FOLVITE) tablet 1,000 mcg  1,000 mcg Oral Daily    heparin (porcine) subcutaneous injection 5,000 Units  5,000 Units Subcutaneous Q8H [address]    hydrALAZINE (APRESOLINE) tablet 50 mg  50 mg Oral Q8H [address]    insulin glargine (LANTUS) subcutaneous injection 1 Units 0 01 mL  1 Units Subcutaneous HS    insulin lispro (HumaLOG) 100 units/mL subcutaneous injection 1-5 Units  1-5 Units Subcutaneous HS    insulin lispro (HumaLOG) 100 units/mL subcutaneous injection 1-6 Units  1-6 Units Subcutaneous TID AC    levothyroxine tablet 125 mcg  125 mcg Oral Early Morning    LORazepam (ATIVAN) tablet 2 mg  2 mg Oral TID PRN    metoprolol tartrate (LOPRESSOR) tablet 50 mg  50 mg Oral Q12H [address]UNA    ondansetron (ZOFRAN) injection 4 mg  4 mg Intravenous Q6H PRN    pravastatin (PRAVACHOL) tablet 80 mg  80 mg Oral Daily With Dinner    predniSONE tablet 5 mg  5 mg Oral Daily    rOPINIRole (REQUIP) tablet 0 25 mg  0 25 mg Oral BID    sertraline (ZOLOFT) tablet 200 mg  200 mg Oral Daily    sevelamer (RENAGEL) tablet 800 mg  800 mg Oral TID With Meals    sodium bicarbonate 150 mEq in dextrose 5 % 1,000 mL infusion  70 mL/hr Intravenous Continuous    tacrolimus (PROGRAF) capsule 2 mg  2 mg Oral QPM    tacrolimus (PROGRAF) capsule 3 mg  3 mg Oral QAM       Allergies: Allergies   Allergen Reactions    Ixazomib Rash     Ninlaro    Revlimid [Lenalidomide] Rash    Cefadroxil     Latex Rash    Morphine Other (See Comments)     Other reaction(s): Other (See Comments)  projectile vomiting    Morphine And Related GI Intolerance    Myrbetriq [Mirabegron] Hives    Penicillins Hives     Other reaction(s):  Other (See Comments)  Respiratory Distress,hives  Tolerates cefazolin   :    Social and Family History:   Social History:   Social History     Tobacco Use    Smoking status: Former Smoker     Last attempt to quit: 2012     Years since quittin 4    Smokeless tobacco: Never Used   Substance Use Topics    Alcohol use: Never     Frequency: Never     Binge frequency: Never     Comment: (history)    Drug use: Never     Types: Hydrocodone     Comment: Past cocaine use many years ago - no current use     Social History     Tobacco Use   Smoking Status Former Smoker    Last attempt to quit: 2012    Years since quittin 4   Smokeless Tobacco Never Used       Family History:  Family History   Problem Relation Age of Onset    Hypertension Mother     Cancer Mother     Hypertension Father     Cancer Father     Cancer Maternal Grandfather     Cancer Paternal Grandmother     Cancer Paternal Grandfather     Depression Sister     Breast cancer Maternal Grandmother 77   :     Review of Systems:     Review of Systems - History obtained from chart review and the patient  General ROS: positive for  - chills  Respiratory ROS: no cough, shortness of breath, or wheezing  Cardiovascular ROS: no chest pain or dyspnea on exertion  Gastrointestinal ROS: positive for - abdominal pain  Genito-Urinary ROS: positive for - dysuria and urinary frequency/urgency  Neurological ROS: no TIA or stroke symptoms    All other systems queried were negative  Physical Exam:   General: Patient is pleasant and in NAD  Awake and alert  /61   Pulse 61   Temp 97 8 °F (36 6 °C) (Oral)   Resp 19   Ht 5' 8" (1 727 m)   Wt 100 kg (220 lb 7 4 oz)   LMP  (LMP Unknown)   SpO2 99%   BMI 33 52 kg/m² Temp (24hrs), Av 9 °F (36 6 °C), Min:97 8 °F (36 6 °C), Max:98 °F (36 7 °C)  current; Temperature: 97 8 °F (36 6 °C)  I/O last 24 hours:   In: 1350 [P O :780; I V :520; IV Piggyback:50]  Out: 6658 [Urine:2225]    General appearance: alert and oriented, in no acute distress, appears stated age, cooperative and no distress  Head: Normocephalic, without obvious abnormality, atraumatic  Neck: no adenopathy, no carotid bruit, no JVD, supple, symmetrical, trachea midline and thyroid not enlarged, symmetric, no tenderness/mass/nodules  Lungs: diminished breath sounds  Heart: regular rate and rhythm, S1, S2 normal, no murmur, click, rub or gallop  Abdomen: soft, non-tender; bowel sounds normal; no masses,  no organomegaly  Extremities: extremities normal, warm and well-perfused; no cyanosis, clubbing, or edema  Pulses: 2+ and symmetric  Neurologic: Grossly normal  No urinary drains    Labs:     Lab Results   Component Value Date    HGB 7 4 (L) 10/03/2019    HCT 24 8 (L) 10/03/2019    WBC 6 53 10/03/2019     10/03/2019   ]    Lab Results   Component Value Date     (L) 01/06/2016    K 4 0 10/03/2019     (H) 10/03/2019    CO2 18 (L) 10/03/2019    BUN 40 (H) 10/03/2019    CREATININE 2 54 (H) 10/03/2019    CALCIUM 8 3 10/03/2019    GLUCOSE 103 09/13/2017   ]    Imaging:   I personally reviewed the images and report of the following studies, and reviewed them with the patient:    US Renal: see below    Radiology Results (last 21 days)     Procedure Component Value Units Date/Time               Thank you for allowing me to participate in this patients care  Please do not hesitate to call with any additional questions    MER Figueroa

## 2019-10-03 NOTE — PLAN OF CARE
Problem: Potential for Falls  Goal: Patient will remain free of falls  Description  INTERVENTIONS:  - Assess patient frequently for physical needs  -  Identify cognitive and physical deficits and behaviors that affect risk of falls    -  [place] fall precautions as indicated by assessment   - Educate patient/family on patient safety including physical limitations  - Instruct patient to call for assistance with activity based on assessment  - Modify environment to reduce risk of injury  - Consider OT/PT consult to assist with strengthening/mobility  Outcome: Progressing     Problem: PAIN - ADULT  Goal: Verbalizes/displays adequate comfort level or baseline comfort level  Description  Interventions:  - Encourage patient to monitor pain and request assistance  - Assess pain using appropriate pain scale  - Administer analgesics based on type and severity of pain and evaluate response  - Implement non-pharmacological measures as appropriate and evaluate response  - Consider cultural and social influences on pain and pain management  - Notify physician/advanced practitioner if interventions unsuccessful or patient reports new pain  Outcome: Progressing     Problem: INFECTION - ADULT  Goal: Absence or prevention of progression during hospitalization  Description  INTERVENTIONS:  - Assess and monitor for signs and symptoms of infection  - Monitor lab/diagnostic results  - Monitor all insertion sites, i e  indwelling lines, tubes, and drains  - Monitor endotracheal if appropriate and nasal secretions for changes in amount and color  - [place] appropriate cooling/warming therapies per order  - Administer medications as ordered  - Instruct and encourage patient and family to use good hand hygiene technique  - Identify and instruct in appropriate isolation precautions for identified infection/condition  Outcome: Progressing  Goal: Absence of fever/infection during neutropenic period  Description  INTERVENTIONS:  - Monitor WBC    Outcome: Progressing     Problem: SAFETY ADULT  Goal: Maintain or return to baseline ADL function  Description  INTERVENTIONS:  -  Assess patient's ability to carry out ADLs; assess patient's baseline for ADL function and identify physical deficits which impact ability to perform ADLs (bathing, care of mouth/teeth, toileting, grooming, dressing, etc )  - Assess/evaluate cause of self-care deficits   - Assess range of motion  - Assess patient's mobility; develop plan if impaired  - Assess patient's need for assistive devices and provide as appropriate  - Encourage maximum independence but intervene and supervise when necessary  - Involve family in performance of ADLs  - Assess for home care needs following discharge   - Consider OT consult to assist with ADL evaluation and planning for discharge  - Provide patient education as appropriate  Outcome: Progressing  Goal: Maintain or return mobility status to optimal level  Description  INTERVENTIONS:  - Assess patient's baseline mobility status (ambulation, transfers, stairs, etc )    - Identify cognitive and physical deficits and behaviors that affect mobility  - Identify mobility aids required to assist with transfers and/or ambulation (gait belt, sit-to-stand, lift, walker, cane, etc )  - [place] fall precautions as indicated by assessment  - Record patient progress and toleration of activity level on Mobility SBAR; progress patient to next Phase/Stage  - Instruct patient to call for assistance with activity based on assessment  - Consider rehabilitation consult to assist with strengthening/weightbearing, etc   Outcome: Progressing     Problem: Knowledge Deficit  Goal: Patient/family/caregiver demonstrates understanding of disease process, treatment plan, medications, and discharge instructions  Description  Complete learning assessment and assess knowledge base    Interventions:  - Provide teaching at level of understanding  - Provide teaching via preferred learning methods  Outcome: Progressing

## 2019-10-03 NOTE — ASSESSMENT & PLAN NOTE
· Follows hematology outpatient (Dr Samuel Hernandez)  · Treatment currently on hold given infection and renal function

## 2019-10-03 NOTE — PROGRESS NOTES
Progress Note - Ether Expose 1965, 53 y o  female MRN: [id number]  Unit/Bed#: -01 Encounter: [id number] DOS: 10/3/19  Primary Care Provider: Nel Means MD   Date and time admitted to hospital: 10/1/2019 12:03 PM    * UTI (urinary tract infection)  Assessment & Plan  · Patient reports to dysuria, increased frequency/incontinence x 2 days  Now resolved  · UA with 30-50 WBC, innumerable bacteria, large leukocytes, positive nitrite   · IV abx: ceftriaxone  · Urine culture so far pos for e coli, f/u sensitivity   · BC x 2 NTD  · Unfortunately with re-admissions with recurrent UTI  appreciate ID input  Request urology eval to r/o possible anatomical abnly for recurrence of infx? · Bladder scan neg      Acute renal failure superimposed on stage 4 chronic kidney disease [facility])  Assessment & Plan  · Nephrology following, appreciate input  She is s/p kidney and pancreas transplant in 1998  · EDUARDO on CKD stage 4  Previous baseline Cr 1 9-2 1  Follows with Dr Tamir Aguilar outpatient  · Was recently admitted 9/13/19 with EDUARDO at that time, peak Cr that admission 4 4  Improved to 2 8 on discharge  · Improving, Cr 2 75 --> 2 54 today   · Lasix continues to be held  · IV bicarb drip given metabolic acidosis, improving   · Transplant kidney US: Arterial resistive indices within transplant lower pole renal parenchyma and transplant renal artery measuring between 0 8 and 0 9, previously resistive indices measuring up to 0 8 in early September  Transplant renal artery dose not suggest stenosis    Findings are more concerning for developing transplant rejection     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)  Assessment & Plan  Lab Results   Component Value Date    HGBA1C 5 1 09/09/2019     Recent Labs     10/02/19  1605 10/02/19  2058 10/03/19  0700 10/03/19  1102   POCGLU 160* 116 138 115     Blood Sugar Average: Last 72 hrs:  · Well controlled evidenced by A1C  · Patient takes toujeo 1 unit qhs - continue  · SSI, accu checks  (P) 057 48    Metabolic acidosis  Assessment & Plan  · Likely in setting of acute renal failure, improving   · On oral bicarb as an outpatient - continue IV bicarb drip   · Nephrology following, appreciate input   · BMP in AM     Multiple myeloma not having achieved remission [facility])  Assessment & Plan  · [facility] hematology outpatient (Dr Freida Hooks)  · Treatment currently on hold given infection and renal function     Major depressive disorder, recurrent episode, in full remission ([facility] [address] )  Assessment & Plan  · Continue Abilify, BuSpar, Cymbalta, Zoloft, p r n  Ativan    Status post kidney transplant  Assessment & Plan  · Kidney and pancreas transplant in 1998  · Transplant kidney US: Arterial resistive indices within transplant lower pole renal parenchyma and transplant renal artery measuring between 0 8 and 0 9, previously resistive indices measuring up to 0 8 in early September  Transplant renal artery dose not suggest stenosis  Findings are more concerning for developing transplant rejection   · Nephrology following, appreciate input  · Continue prednisone and Prograf - level normal     Anemia  Assessment & Plan  · Chronic anemia, Hgb 7 4 today  · Pt asx, likely due to renal failure   · Monitor CBC  · Iron supplement     Essential hypertension  Assessment & Plan  · Continue amlodipine 10 mg every morning, 5 mg every evening  Clonidine 0 3 mg TID, hydralazine 50 mg TID, metoprolol 50 mg q12hr , doxazosin 4 mg daily    Acquired hypothyroidism  Assessment & Plan  · Continue levothyroxine     VTE Pharmacologic Prophylaxis:   Pharmacologic: Heparin  Mechanical VTE Prophylaxis in Place: Yes    Patient Centered Rounds: I have performed bedside rounds with nursing staff today      Discussions with Specialists or Other Care Team Provider: nursing     Education and Discussions with Family / Patient: patient, declined call to mom, states she is golfing     Time Spent for Care: 30 minutes  More than 50% of total time spent on counseling and coordination of care as described above  Current Length of Stay: 2 day(s)    Current Patient Status: Inpatient   Certification Statement: The patient will continue to require additional inpatient hospital stay due to pending final urine culture, EDUARDO on CKD     Discharge Plan: pending, likely in 24 hours if urine C&S resulted and renal function continues to improve     Code Status: Level 1 - Full Code    Subjective:   Pt seen and examined at bedside  Feels well today, no urinary complaints  Ambulating with PT  No dizziness, sob, or LH  Objective:     Vitals:   Temp (24hrs), Av 9 °F (36 6 °C), Min:97 8 °F (36 6 °C), Max:98 °F (36 7 °C)    Temp:  [97 8 °F (36 6 °C)-98 °F (36 7 °C)] 97 8 °F (36 6 °C)  Resp:  [18-20] 19  BP: (159-174)/(61-75) 159/61  Body mass index is 33 52 kg/m²  Input and Output Summary (last 24 hours): Intake/Output Summary (Last 24 hours) at 10/3/2019 1237  Last data filed at 10/3/2019 1100  Gross per 24 hour   Intake 870 ml   Output 1375 ml   Net -505 ml       Physical Exam:     Physical Exam   Constitutional: She is oriented to person, place, and time  She appears well-developed and well-nourished  No distress  HENT:   Head: Normocephalic and atraumatic  Eyes: EOM are normal  No scleral icterus  Neck: Normal range of motion  Neck supple  Cardiovascular: Normal rate, regular rhythm and normal heart sounds  No murmur heard  Pulmonary/Chest: Effort normal and breath sounds normal    Abdominal: Soft  Bowel sounds are normal    Musculoskeletal: She exhibits deformity (Left foot toe amputations)  Neurological: She is alert and oriented to person, place, and time  Skin: Skin is warm     Psychiatric:   Flat affect     Additional Data:     Labs:    Results from last 7 days   Lab Units 10/03/19  0521   WBC Thousand/uL 6 53   HEMOGLOBIN g/dL 7 4*   HEMATOCRIT % 24 8*   PLATELETS Thousands/uL 161   NEUTROS PCT % 53   LYMPHS PCT % 26   MONOS PCT % 9   EOS PCT % 9*     Results from last 7 days   Lab Units 10/03/19  0521  10/01/19  1222   SODIUM mmol/L 139   < > 140   POTASSIUM mmol/L 4 0   < > 4 6   CHLORIDE mmol/L 111*   < > 118*   CO2 mmol/L 18*   < > 15*   BUN mg/dL 40*   < > 38*   CREATININE mg/dL 2 54*   < > 2 70*   ANION GAP mmol/L 10   < > 7   CALCIUM mg/dL 8 3   < > 9 2   ALBUMIN g/dL  --   --  3 0*   TOTAL BILIRUBIN mg/dL  --   --  0 35   ALK PHOS U/L  --   --  119*   ALT U/L  --   --  15   AST U/L  --   --  8   GLUCOSE RANDOM mg/dL 103   < > 93    < > = values in this interval not displayed  Results from last 7 days   Lab Units 10/03/19  1102 10/03/19  0700 10/02/19  2058 10/02/19  1605 10/02/19  1103 10/02/19  0638 10/01/19  2054 10/01/19  1818   POC GLUCOSE mg/dl 115 138 116 160* 103 137 181* 124         Results from last 7 days   Lab Units 10/01/19  1222   LACTIC ACID mmol/L 0 5           * I Have Reviewed All Lab Data Listed Above  * Additional Pertinent Lab Tests Reviewed: [address] Admission Reviewed    Imaging:    Imaging Reports Reviewed Today Include: all  Imaging Personally Reviewed by Myself Includes:  none    Recent Cultures (last 7 days):     Results from last 7 days   Lab Units 10/01/19  1416 10/01/19  1308 10/01/19  1253   BLOOD CULTURE  No Growth at 24 hrs   No Growth at 24 hrs   --    URINE CULTURE   --   --  >100,000 cfu/ml Escherichia coli*       Last 24 Hours Medication List:     Current Facility-Administered Medications:  acetaminophen 650 mg Oral Q6H PRN Zoe Redding PA-C    amLODIPine 10 mg Oral QAM Muriel Leavitt MD    amLODIPine 5 mg Oral QPM Muriel Leavitt MD    ARIPiprazole 30 mg Oral HS Zoe Redding PA-C    aspirin 81 mg Oral Daily Zoe Redding PA-C    bisacodyl 5 mg Oral Daily PRN Ana Shin MD    busPIRone 5 mg Oral BID Zoe Redding PA-C    calcium carbonate 1,000 mg Oral Daily PRN Zoe Redding PA-C    cefTRIAXone 1,000 mg Intravenous Q24H Zoe JOHN Redding Last Rate: 1,000 mg (10/02/19 1415)   cloNIDine 0 3 mg Oral Q8H [address] Katiana Bain MD    doxazosin 4 mg Oral HS Katiana Bain MD    DULoxetine 60 mg Oral BID Zoe Redding PA-C    ferrous sulfate 325 mg Oral Daily With Breakfast Zoe Redding PA-C    folic acid 0,360 mcg Oral Daily Zoe Redding PA-C    heparin (porcine) 5,000 Units Subcutaneous Q8H [address] Zoe Redding PA-C    hydrALAZINE 50 mg Oral [facility] Katiana Bain MD    insulin glargine 1 Units Subcutaneous HS Zoe Redding PA-C    insulin lispro 1-5 Units Subcutaneous HS Zoe Redding PA-C    insulin lispro 1-6 Units Subcutaneous TID AC Zoe Redding PA-C    levothyroxine 125 mcg Oral Early Morning Zoe Redding PA-C    LORazepam 2 mg Oral TID PRN Zoe Redding PA-C    metoprolol tartrate 50 mg Oral Q12H [address] Katiana Bain MD    ondansetron 4 mg Intravenous Q6H PRN Zoe Redding PA-C    pravastatin 80 mg Oral Daily With [employer]OHN    predniSONE 5 mg Oral Daily Zoe Redding PA-C    rOPINIRole 0 25 mg Oral BID Zoe Redding PA-C    sertraline 200 mg Oral Daily Zoe Redding PA-C    sevelamer 800 mg Oral TID With Meals Zoe Redding PA-C    sodium bicarbonate infusion 70 mL/hr Intravenous Continuous Zoe Redding PA-C Last Rate: 70 mL/hr (10/03/19 4035)   tacrolimus 2 mg Oral QPM Zoe Redding PA-C    tacrolimus 3 mg Oral QAM Zoe Redding PA-C         Today, Patient Was Seen By: Alessandro Trejo PA-C    ** Please Note: Dictation voice to text software may have been used in the creation of this document   **

## 2019-10-03 NOTE — PROGRESS NOTES
Progress Note - Nephrology   Ether Expose 53 y o  female MRN: [id number]  Unit/Bed#: -01 Encounter: [id number]    ASSESSMENT AND PLAN:  1  CKD stage 3/4:  Baseline creatinine 1 9-2 1 status post renal transplant followed by Dr Tamir Aguilar  Patient with transplant glomerulopathy  2  AK I:  · Peak creatinine 2 75 as of 10/02/2019  · Current creatinine improved to 2 54  · Etiology felt to be related to UTI/? Pyelonephritis/prerenal   Negative obstructive uropathy and no renal artery stenosis based on the studies  Recommend:  · IV fluids  · Continue current immunosuppressive medications  Tacrolimus level 3 6  I would gently adjust the dose of that tacrolimus/Prograf to get to goal of 9 9-8     2  Metabolic acidosis:  Most compatible with AK I/CKD  Borderline anion gap  Continue IV bicarbonate, add oral sodium bicarbonate  3  Mineral bone disorder:  Magnesium and phosphorus are acceptable  4  Hypertension:  Overall improved on multiple medications  For now just observe with current hold parameters as well to avoid hypoperfusion in the setting of AK I     5  UTI/pyelonephritis?:  E coli  Question at some point as she require a cystoscopy  Apparently urology has seen the patient and will plan to do an outpatient study  Also the other question is whether not suppressive antibiotics at some point may be require given multiple urinary tract infections  6  Other problems:  · Status post failed pancreatic transplant now type 1 diabetes mellitus per primary team        Subjective:   Overall the patient feels much better  No further urinary symptoms, abdominal discomfort, flank pain  She denies any nausea vomiting or diarrhea  She denies any chest pain or shortness of Breath  Objective:     Vitals: Blood pressure 158/61, pulse 61, temperature 97 8 °F (36 6 °C), temperature source Oral, resp  rate 19, height 5' 8" (1 727 m), weight 100 kg (220 lb 7 4 oz), SpO2 99 %  ,Body mass index is 33 52 kg/m²      Weight (last 2 days)     Date/Time   Weight    10/03/19 0534   100 (220 46)    10/02/19 0548   100 (221 34)    10/01/19 17:14:56   99 3 (218 92)    10/01/19 1208   101 (222)                Intake/Output Summary (Last 24 hours) at 10/3/2019 1435  Last data filed at 10/3/2019 1354  Gross per 24 hour   Intake 540 ml   Output 1825 ml   Net -1285 ml            Physical Exam: General:  No acute distress, feeling better  Skin:  No acute rash  Eyes:  No scleral icterus  ENT:  Moist mucous membranes  Neck:  Supple, no jugular venous distention  Chest:  Clear to auscultation  CVS:  No rubs or gallops appreciable, regular rate rhythm  Abdomen:  Soft and nontender normal bowel sounds, in particular is no tenderness over the left lower quadrant  Extremities:  No edema of significance today and no cyanosis  Neuro:  No gross focality  Psych:  Alert and oriented                Medications:    Scheduled Meds:  Current Facility-Administered Medications:  acetaminophen 650 mg Oral Q6H PRN Zoe Redding PA-C    amLODIPine 10 mg Oral QAM Saira Moctezuma MD    amLODIPine 5 mg Oral QPM Saira Moctezuma MD    ARIPiprazole 30 mg Oral HS Zoe Redding PA-C    aspirin 81 mg Oral Daily Zoe Redding PA-C    bisacodyl 5 mg Oral Daily PRN Flor Marshall MD    busPIRone 5 mg Oral BID Zoe Redding PA-C    calcium carbonate 1,000 mg Oral Daily PRN Zoe Redding PA-C    cefTRIAXone 1,000 mg Intravenous Q24H Zoe Redding PA-C Last Rate: 1,000 mg (10/03/19 1342)   cloNIDine 0 3 mg Oral Q8H [address] Saira Moctezuma MD    doxazosin 4 mg Oral HS Saira Moctezuma MD    DULoxetine 60 mg Oral BID Zoe Redding PA-C    ferrous sulfate 325 mg Oral Daily With Breakfast Zoe Redding PA-C    folic acid 4,556 mcg Oral Daily Zoe Redding PA-C    heparin (porcine) 5,000 Units Subcutaneous Q8H [address] Zoe Redding PA-C    hydrALAZINE 50 mg Oral [facility] Saira Moctezuma MD    insulin glargine 1 Units Subcutaneous HS Zoe Redding PA-C    insulin lispro 1-5 Units Subcutaneous HS Zoe Redding PA-C    insulin lispro 1-6 Units Subcutaneous TID AC Zoe Redding PA-C    levothyroxine 125 mcg Oral Early Morning Zoe Redding PA-C    LORazepam 2 mg Oral TID PRN Zoe Redding PA-C    metoprolol tartrate 50 mg Oral Q12H [address] Comfort [place] MD    ondansetron 4 mg Intravenous Q6H PRN Zoe Redding PA-C    pravastatin 80 mg Oral Daily With [employer], JOHN    predniSONE 5 mg Oral Daily Zoe Redidng PA-C    rOPINIRole 0 25 mg Oral BID Zoe Redding PA-C    sertraline 200 mg Oral Daily Zoe Redding PA-C    sevelamer 800 mg Oral TID With Meals Zoe Redding PA-C    sodium bicarbonate infusion 70 mL/hr Intravenous Continuous Zoe Redding PA-C Last Rate: 70 mL/hr (10/03/19 0445)   tacrolimus 2 mg Oral QPM Zoe Redding PA-C    tacrolimus 3 mg Oral QAM Zoe Redding PA-C        PRN Meds:   acetaminophen    bisacodyl    calcium carbonate    LORazepam    ondansetron    Continuous Infusions:  sodium bicarbonate infusion 70 mL/hr Last Rate: 70 mL/hr (10/03/19 0445)       Lab, Imaging and other studies: I have personally reviewed pertinent labs    Laboratory Results:  Results from last 7 days   Lab Units 10/03/19  0521 10/02/19  0444 10/01/19  1222   WBC Thousand/uL 6 53  --  8 58   HEMOGLOBIN g/dL 7 4*  --  8 4*   HEMATOCRIT % 24 8*  --  27 6*   PLATELETS Thousands/uL 161  --  168   POTASSIUM mmol/L 4 0 4 5 4 6   CHLORIDE mmol/L 111* 117* 118*   CO2 mmol/L 18* 15* 15*   BUN mg/dL 40* 36* 38*   CREATININE mg/dL 2 54* 2 75* 2 70*   CALCIUM mg/dL 8 3 8 7 9 2   MAGNESIUM mg/dL  --  2 1  --    PHOSPHORUS mg/dL  --  4 4  --      Urinalysis: Lab Results   Component Value Date    COLORU Yellow 10/01/2019    COLORU Yellow 07/29/2016    CLARITYU Cloudy 10/01/2019    CLARITYU Cloudy 07/29/2016    SPECGRAV 1 015 10/01/2019    SPECGRAV 1 010 07/29/2016    PHUR 8 5 (H) 10/01/2019    PHUR 7 5 07/29/2016    LEUKOCYTESUR Large (A) 10/01/2019    LEUKOCYTESUR 3+ 07/29/2016    NITRITE Positive (A) 10/01/2019    NITRITE NEG 07/29/2016    PROTEINUA 1+ 07/29/2016    GLUCOSEU Negative 10/01/2019    GLUCOSEU Negative 07/20/2015    KETONESU Negative 10/01/2019    KETONESU NEG 07/29/2016    BILIRUBINUR Negative 10/01/2019    BILIRUBINUR NEG 07/29/2016    BLOODU Negative 10/01/2019    BLOODU NEG 07/29/2016     ABGs: No results found for: [facility]  Radiology review:     Portions of the record may have been created with voice recognition software   Occasional wrong word or "sound a like" substitutions may have occurred due to the inherent limitations of voice recognition software   Read the chart carefully and recognize, using context, where substitutions have occurred

## 2019-10-03 NOTE — ASSESSMENT & PLAN NOTE
· Likely in setting of acute renal failure, improving   · On oral bicarb as an outpatient - continue IV bicarb drip   · Nephrology following, appreciate input   · BMP in AM

## 2019-10-03 NOTE — PHYSICAL THERAPY NOTE
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 ([facility] )    Essential hypertension    Anemia    Status post kidney transplant    Major depressive disorder, recurrent episode, in full remission ([facility] )    Multiple myeloma not having achieved remission ([facility] [address] )    Acute renal failure superimposed on stage 4 chronic kidney disease ([address] )    Metabolic acidosis       Past Medical History  Past Medical History:   Diagnosis Date    Abnormal liver function test     Acute kidney injury ([facility] [address] )     Acute on chronic congestive heart failure (HCC)     Allergic urticaria     Anemia     Cancer (HCC)     Multiple myeloma    Cervical dysplasia     Cholelithiasis     Chronic diastolic (congestive) heart failure ([facility] [address] ) 9/18/2017    Diabetes mellitus (HCC)     Previous, controlled with diet    Diabetes mellitus with foot ulcer ([facility] [address] )     Disease of thyroid gland     Encephalopathy     Hematuria     + leak est- secondary to UTIs/panc drainage    History of transfusion     Hyperkalemia     Hypertension     Iliotibial band syndrome     Lumbar radiculopathy     Multiple myeloma (HCC)     Multiple myeloma ([address] )     Night blindness     Nonrheumatic aortic (valve) insufficiency     Pneumonia     Renal disorder     Retinopathy     Seborrhea     Seizure ([facility] [address] )     Shingles     Sinus tachycardia     B blocker - cardio echo stress test 02 normal/neg LE doppler 2/02 OK and 12/07    Status post simultaneous kidney and pancreas transplant ([facility] [address] )     Toe amputation status     Trochanteric bursitis         Past Surgical History  Past Surgical History:   Procedure Laterality Date    CATARACT EXTRACTION      CHOLECYSTECTOMY      COLONOSCOPY      two polyps in the rectum removed and biopsied diverticulosis in the sigmoid colon, external hemorrhoiods- Dr Barfield    COMBINED KIDNEY-PANCREAS TRANSPLANT N/A     CT BONE MARROW BIOPSY AND ASPIRATION  4/17/2019    CYSTOSCOPY N/A 10/13/2016    Procedure: CYSTOSCOPY, retrograde pyelogram, biopsy of ureteral polyp; Surgeon: Osmel Mckay MD;  Location: [facility];  Service:    [facility] CURETTAGE OF UTERUS      ESOPHAGOGASTRODUODENOSCOPY N/A 11/20/2017    Procedure: ESOPHAGOGASTRODUODENOSCOPY (EGD); Surgeon: Mary Ann Her DO;  Location: [facility]; Service: Gastroenterology    EYE SURGERY      cataracts    FOOT AMPUTATION THROUGH METATARSAL Left     FOOT SURGERY Right     excision of metatarsal heads    HALLUX VALGUS CORRECTION Right     NEPHRECTOMY TRANSPLANTED ORGAN      PANCREATIC TRANSPLANT REMOVAL  1998          10/03/19 1235   Pain Assessment   Pain Assessment No/denies pain   Pain Score No Pain   Restrictions/Precautions   Weight Bearing Precautions Per Order No   Braces or Orthoses   (Orthotic shoes 2/2 toe amputations)   Other Precautions Multiple lines; Fall Risk   General   Chart Reviewed Yes   Family/Caregiver Present No   Cognition   Overall Cognitive Status WFL   Arousal/Participation Alert; Cooperative   Attention Attends with cues to redirect   Orientation Level Oriented X4   Memory Decreased short term memory   Following Commands Follows multistep commands without difficulty   Subjective   Subjective Pt  agreeable to therapy session   Bed Mobility   Supine to Sit 7  Independent   Sit to Supine 7  Independent   Transfers   Sit to Stand 6  Modified independent   Stand to Sit 6  Modified independent   Additional Comments 1st trial perform with RW, 2nd trial performed with SPC; Good safety awareness demostrated   Ambulation/Elevation   Gait pattern Foward flexed   Gait Assistance 6  Modified independent  (Mod I for RW; Min A with SPC )   Additional items Assist x 1   Assistive Device Rolling walker  (and SPC)   Distance 100 feetx2 with RW, no LOB, steady t/o; 50 feet with SPC, 1 LOB - min A to recover LOB; Increased steadiness with RW Vs SPC; Mild SOB after return from ambulation, SpO2: >94% on RA    Balance   Static Sitting Good   Dynamic Sitting Good   Static Standing Fair   Dynamic Standing Fair   Ambulatory Fair   Endurance Deficit   Endurance Deficit Yes   Endurance Deficit Description SOB, endurance   Activity Tolerance   Activity Tolerance Patient tolerated treatment well   Nurse Made Aware Cleared by MICHELLE Guallpa for PT session   Exercises   Hip Flexion Sitting;20 reps;AROM; Bilateral  (x2)   Hip Abduction Sitting;15 reps;AROM; Bilateral  (Mild resistance; x2)   Knee AROM Long Arc Quad Sitting;20 reps;AROM; Bilateral  (x2)   Assessment   Assessment Pt is making steady progress towards treatment goals  At this time, Pt  Is at independent with bed mobility, mod I with transfers, and mod I level ambulating with RW  Pt  Trial SPC vs RW; at this time recommend use of RW instead of SPC  Pt  Ambulated 100 feet x 2 mod I and ambulated 50 feet x 2 at min A level  1 LOB ambulating with SPC, min A to recover balance  Pt can ambulate longer distance and decreased assistance level when using RW  At this time, recommend continue use of RW  Educated Pt  On endurance, energy conservation, home safety, and inpatient PT discharge  Pt no longer needs inpatient PT services and will be discharged at this time  Please re-consult if medical status changes  Upon discharge once medically stable; recommend outpatient PT to improve patient's independence and increase balance awareness  End of session, patient supine in bed with all needs in reach   D/C PT   Goals   STG Expiration Date 10/12/19   PT Treatment Day 1   Recommendation   Recommendation Outpatient PT   Equipment Recommended Walker  (Pt  owns one )   PT - OK to Discharge Yes       Herman Rondon, ROXT, PT

## 2019-10-03 NOTE — TELEPHONE ENCOUNTER
Cathye Babinski is a 57-year-old medically complex female with history of recurrent UTI and  renal--pancreatic transplantation  Please contact patient for non urgent hospital follow-up in approximately 4 weeks

## 2019-10-03 NOTE — ASSESSMENT & PLAN NOTE
Lab Results   Component Value Date    HGBA1C 5 1 09/09/2019     Recent Labs     10/02/19  1605 10/02/19  2058 10/03/19  0700 10/03/19  1102   POCGLU 160* 116 138 115     Blood Sugar Average: Last 72 hrs:  · Well controlled evidenced by A1C  · Patient takes toujeo 1 unit qhs - continue  · SSI, accu checks  (P) 134 25

## 2019-10-03 NOTE — ASSESSMENT & PLAN NOTE
· Chronic anemia, Hgb 7 4 today  · Pt asx, likely due to renal failure   · Monitor CBC  · Iron supplement

## 2019-10-03 NOTE — ASSESSMENT & PLAN NOTE
· Patient reports to dysuria, increased frequency/incontinence x 2 days  Now resolved  · UA with 30-50 WBC, innumerable bacteria, large leukocytes, positive nitrite   · IV abx: ceftriaxone  · Urine culture so far pos for e coli, f/u sensitivity   · BC x 2 NTD  · Unfortunately with re-admissions with recurrent UTI  appreciate ID input  Request urology eval to r/o possible anatomical abnly for recurrence of infx?   · Bladder scan neg

## 2019-10-03 NOTE — PLAN OF CARE
Problem: Potential for Falls  Goal: Patient will remain free of falls  Description  INTERVENTIONS:  - Assess patient frequently for physical needs  -  Identify cognitive and physical deficits and behaviors that affect risk of falls    -  [place] fall precautions as indicated by assessment   - Educate patient/family on patient safety including physical limitations  - Instruct patient to call for assistance with activity based on assessment  - Modify environment to reduce risk of injury  - Consider OT/PT consult to assist with strengthening/mobility  10/3/2019 1002 by Selwyn Antunez RN  Outcome: Progressing  10/3/2019 0758 by Selwyn Antunez RN  Outcome: Progressing     Problem: PAIN - ADULT  Goal: Verbalizes/displays adequate comfort level or baseline comfort level  Description  Interventions:  - Encourage patient to monitor pain and request assistance  - Assess pain using appropriate pain scale  - Administer analgesics based on type and severity of pain and evaluate response  - Implement non-pharmacological measures as appropriate and evaluate response  - Consider cultural and social influences on pain and pain management  - Notify physician/advanced practitioner if interventions unsuccessful or patient reports new pain  10/3/2019 1002 by Selwyn Antunez RN  Outcome: Progressing  10/3/2019 0758 by Selwyn Antunez RN  Outcome: Progressing     Problem: INFECTION - ADULT  Goal: Absence or prevention of progression during hospitalization  Description  INTERVENTIONS:  - Assess and monitor for signs and symptoms of infection  - Monitor lab/diagnostic results  - Monitor all insertion sites, i e  indwelling lines, tubes, and drains  - Monitor endotracheal if appropriate and nasal secretions for changes in amount and color  - [place] appropriate cooling/warming therapies per order  - Administer medications as ordered  - Instruct and encourage patient and family to use good hand hygiene technique  - Identify and instruct in appropriate isolation precautions for identified infection/condition  10/3/2019 1002 by Clay Phillips RN  Outcome: Progressing  10/3/2019 0758 by Clay Phillips RN  Outcome: Progressing  Goal: Absence of fever/infection during neutropenic period  Description  INTERVENTIONS:  - Monitor WBC    10/3/2019 1002 by Clay Phillips RN  Outcome: Progressing  10/3/2019 0758 by Clay Phillips RN  Outcome: Progressing     Problem: SAFETY ADULT  Goal: Maintain or return to baseline ADL function  Description  INTERVENTIONS:  -  Assess patient's ability to carry out ADLs; assess patient's baseline for ADL function and identify physical deficits which impact ability to perform ADLs (bathing, care of mouth/teeth, toileting, grooming, dressing, etc )  - Assess/evaluate cause of self-care deficits   - Assess range of motion  - Assess patient's mobility; develop plan if impaired  - Assess patient's need for assistive devices and provide as appropriate  - Encourage maximum independence but intervene and supervise when necessary  - Involve family in performance of ADLs  - Assess for home care needs following discharge   - Consider OT consult to assist with ADL evaluation and planning for discharge  - Provide patient education as appropriate  10/3/2019 1002 by Clay Phillips RN  Outcome: Progressing  10/3/2019 0758 by Clay Phillips RN  Outcome: Progressing  Goal: Maintain or return mobility status to optimal level  Description  INTERVENTIONS:  - Assess patient's baseline mobility status (ambulation, transfers, stairs, etc )    - Identify cognitive and physical deficits and behaviors that affect mobility  - Identify mobility aids required to assist with transfers and/or ambulation (gait belt, sit-to-stand, lift, walker, cane, etc )  - [place] fall precautions as indicated by assessment  - Record patient progress and toleration of activity level on Mobility SBAR; progress patient to next Phase/Stage  - Instruct patient to call for assistance with activity based on assessment  - Consider rehabilitation consult to assist with strengthening/weightbearing, etc   10/3/2019 1002 by Emi Crain RN  Outcome: Progressing  10/3/2019 0758 by Emi Crain RN  Outcome: Progressing     Problem: DISCHARGE PLANNING  Goal: Discharge to home or other facility with appropriate resources  Description  INTERVENTIONS:  - Identify barriers to discharge w/patient and caregiver  - Arrange for needed discharge resources and transportation as appropriate  - Identify discharge learning needs (meds, wound care, etc )  - Arrange for interpretive services to assist at discharge as needed  - Refer to Case Management Department for coordinating discharge planning if the patient needs post-hospital services based on physician/advanced practitioner order or complex needs related to functional status, cognitive ability, or social support system  10/3/2019 1002 by Emi Crain RN  Outcome: Progressing  10/3/2019 0758 by Emi Crain RN  Outcome: Progressing     Problem: Knowledge Deficit  Goal: Patient/family/caregiver demonstrates understanding of disease process, treatment plan, medications, and discharge instructions  Description  Complete learning assessment and assess knowledge base    Interventions:  - Provide teaching at level of understanding  - Provide teaching via preferred learning methods  10/3/2019 1002 by Emi Crain RN  Outcome: Progressing  10/3/2019 0758 by Emi Crain RN  Outcome: Progressing

## 2019-10-03 NOTE — PROGRESS NOTES
Progress Note - Infectious Disease   Jaye Stark 53 y o  female MRN: [id number]  Unit/Bed#: -01 Encounter: [id number]      Impression/Recommendations:  1  Symptomatic UTI  Objective findings for UTI are not impressive  However, given history of renal transplant and symptoms, agree with treatment for presumptive UTI  Patient has no fever or leukocytosis  She is systemically well, without evidence of sepsis or systemic toxicity  Urine culture with cefazolin susceptible E coli  Blood cultures remain negative  Change antibiotic to IV cefazolin  Monitor urinary symptoms  Monitor suprapubic tenderness  Monitor temperature/WBC  Follow-up on pending blood cultures  Anticipate transition to p o  Keflex in 24 hours, if patient continues to improve clinically  Treat x 7 days total      2  EDUARDO, superimposed on CKD stage 4  Ultrasound without obstruction of transplanted kidney  Antibiotic dosages adjusted accordingly  Monitor creatinine      3  Encephalopathy, POA  This is most likely secondary to a KI and possible UTI  Mental status is much improved, at baseline  No clinical signs of CNS infection  Monitor mental status      4  Status post distant kidney and pancreas transplantation  Patient is on stable anti rejection regimen      5  Multiple myeloma  Chemotherapy is currently on hold  Patient is not neutropenic      Discussed with patient in detail regarding the above plan  Antibiotics:  Ceftriaxone # 3     Subjective:  Patient feels better  Mental status is good  Energy level improving  No further suprapubic pain  No further dysuria  Temperature stays down  No chills  She is tolerating antibiotic well  No nausea, vomiting or diarrhea      Objective:  Vitals:  Temp:  [97 8 °F (36 6 °C)-98 °F (36 7 °C)] 97 8 °F (36 6 °C)  Resp:  [18-20] 19  BP: (158-174)/(61-75) 158/61  Temp (24hrs), Av 9 °F (36 6 °C), Min:97 8 °F (36 6 °C), Max:98 °F (36 7 °C)  Current: Temperature: 97 8 °F (36 6 °C)    Physical Exam:     General: Awake, alert, cooperative, no distress  Neck:  Supple  No mass  No lymphadenopathy  Lungs: Expansion symmetric, no rales, no wheezing, respirations unlabored  Heart:  Regular rate and rhythm, S1 and S2 normal, no murmur  Abdomen: Soft, nondistended, non-tender, bowel sounds active all four quadrants,        no masses, no organomegaly  Extremities: No edema  No erythema/warmth  No ulcer  Nontender to palpation  Skin:  No rash  Neuro: Moves all extremities  Invasive Devices     Peripheral Intravenous Line            Peripheral IV 10/03/19 Left;Proximal;Ventral (anterior) Forearm less than 1 day                Labs studies:   I have personally reviewed pertinent labs  Results from last 7 days   Lab Units 10/03/19  0521 10/02/19  0444 10/01/19  1222   POTASSIUM mmol/L 4 0 4 5 4 6   CHLORIDE mmol/L 111* 117* 118*   CO2 mmol/L 18* 15* 15*   BUN mg/dL 40* 36* 38*   CREATININE mg/dL 2 54* 2 75* 2 70*   EGFR ml/min/1 73sq m 21 19 19   CALCIUM mg/dL 8 3 8 7 9 2   AST U/L  --   --  8   ALT U/L  --   --  15   ALK PHOS U/L  --   --  119*     Results from last 7 days   Lab Units 10/03/19  0521 10/01/19  1222   WBC Thousand/uL 6 53 8 58   HEMOGLOBIN g/dL 7 4* 8 4*   PLATELETS Thousands/uL 161 168     Results from last 7 days   Lab Units 10/01/19  1416 10/01/19  1308 10/01/19  1253   BLOOD CULTURE  No Growth at 24 hrs  No Growth at 24 hrs   --    URINE CULTURE   --   --  >100,000 cfu/ml Escherichia coli*       Imaging Studies:   I have personally reviewed pertinent imaging study reports and images in PACS  EKG, Pathology, and Other Studies:   I have personally reviewed pertinent reports

## 2019-10-03 NOTE — PLAN OF CARE
Problem: OCCUPATIONAL THERAPY ADULT  Goal: Performs self-care activities at highest level of function for planned discharge setting  See evaluation for individualized goals  Description  Treatment Interventions: Cognitive reorientation, Continued evaluation          See flowsheet documentation for full assessment, interventions and recommendations  Note:   Limitation: Decreased cognition, Decreased endurance  Prognosis: Good  Assessment: Pt is a 53year old female seen for initial OT evaluation s/p admission to [facility] 10/1/19 with altered mental status  Pt dx'd with recurrent UTI  Additional active problems include: acute renal failure superimposed on CKD, DM, metabolic acidosis, multiple myeloma, major depressive disorder, s/p kidney transplant, HTN, hypothyroidism  Pt with active OT orders and cleared by MICHELLE Liu for OT evaluation and OOB mobility  Pt resides alone in a 1st floor apartment with 3 JESSI  Pt reports being mostly I with ADLs, IADLs, and functional mobility with primary use of spc  However, on her "bad days" pt requires PRN assist from parents for IADLs and uses a rw  Pt is a   Pt is currently functioning at/close to her functional baseline, and only requires 1 additional OT session for formal cognitive assessment  From an OT standpoint, recommend home with family support and OP OT for fitness to drive upon d/c  Pt is to continue to benefit from skilled occupational therapy services while in the hospital to maximize functioning and independence with daily activities  See below for OT goals to be addressed during pt's POC       OT Discharge Recommendation: Outpatient OT(fitness to drive)  OT - OK to Discharge: No(pending formal cognitive assessment)

## 2019-10-03 NOTE — ASSESSMENT & PLAN NOTE
· Nephrology following, appreciate input  She is s/p kidney and pancreas transplant in 1998  · EDUARDO on CKD stage 4  Previous baseline Cr 1 9-2 1  Follows with Dr Natalie Price outpatient  · Was recently admitted 9/13/19 with EDUARDO at that time, peak Cr that admission 4 4  Improved to 2 8 on discharge  · Improving, Cr 2 75 --> 2 54 today   · Lasix continues to be held  · IV bicarb drip given metabolic acidosis, improving   · Transplant kidney US: Arterial resistive indices within transplant lower pole renal parenchyma and transplant renal artery measuring between 0 8 and 0 9, previously resistive indices measuring up to 0 8 in early September  Transplant renal artery dose not suggest stenosis    Findings are more concerning for developing transplant rejection

## 2019-10-04 ENCOUNTER — TRANSITIONAL CARE MANAGEMENT (OUTPATIENT)
Dept: FAMILY MEDICINE CLINIC | Facility: CLINIC | Age: 54
End: 2019-10-04

## 2019-10-04 VITALS
SYSTOLIC BLOOD PRESSURE: 164 MMHG | DIASTOLIC BLOOD PRESSURE: 78 MMHG | WEIGHT: 225.2 LBS | HEART RATE: 62 BPM | RESPIRATION RATE: 19 BRPM | TEMPERATURE: 97.9 F | OXYGEN SATURATION: 95 % | HEIGHT: 68 IN | BODY MASS INDEX: 34.13 KG/M2

## 2019-10-04 DIAGNOSIS — I10 BENIGN ESSENTIAL HTN: ICD-10-CM

## 2019-10-04 LAB
ANION GAP SERPL CALCULATED.3IONS-SCNC: 8 MMOL/L (ref 4–13)
BASOPHILS # BLD AUTO: 0.09 THOUSANDS/ΜL (ref 0–0.1)
BASOPHILS NFR BLD AUTO: 1 % (ref 0–1)
BUN SERPL-MCNC: 37 MG/DL (ref 5–25)
CALCIUM SERPL-MCNC: 8.4 MG/DL (ref 8.3–10.1)
CHLORIDE SERPL-SCNC: 108 MMOL/L (ref 100–108)
CO2 SERPL-SCNC: 22 MMOL/L (ref 21–32)
CREAT SERPL-MCNC: 2.51 MG/DL (ref 0.6–1.3)
EOSINOPHIL # BLD AUTO: 0.48 THOUSAND/ΜL (ref 0–0.61)
EOSINOPHIL NFR BLD AUTO: 7 % (ref 0–6)
ERYTHROCYTE [DISTWIDTH] IN BLOOD BY AUTOMATED COUNT: 17.3 % (ref 11.6–15.1)
FERRITIN SERPL-MCNC: 95 NG/ML (ref 8–388)
GFR SERPL CREATININE-BSD FRML MDRD: 21 ML/MIN/1.73SQ M
GLUCOSE SERPL-MCNC: 101 MG/DL (ref 65–140)
GLUCOSE SERPL-MCNC: 120 MG/DL (ref 65–140)
GLUCOSE SERPL-MCNC: 128 MG/DL (ref 65–140)
HCT VFR BLD AUTO: 24.9 % (ref 34.8–46.1)
HGB BLD-MCNC: 7.6 G/DL (ref 11.5–15.4)
IMM GRANULOCYTES # BLD AUTO: 0.09 THOUSAND/UL (ref 0–0.2)
IMM GRANULOCYTES NFR BLD AUTO: 1 % (ref 0–2)
IRON SATN MFR SERPL: 31 %
IRON SERPL-MCNC: 69 UG/DL (ref 50–170)
LYMPHOCYTES # BLD AUTO: 1.71 THOUSANDS/ΜL (ref 0.6–4.47)
LYMPHOCYTES NFR BLD AUTO: 26 % (ref 14–44)
MCH RBC QN AUTO: 29 PG (ref 26.8–34.3)
MCHC RBC AUTO-ENTMCNC: 30.5 G/DL (ref 31.4–37.4)
MCV RBC AUTO: 95 FL (ref 82–98)
MONOCYTES # BLD AUTO: 0.68 THOUSAND/ΜL (ref 0.17–1.22)
MONOCYTES NFR BLD AUTO: 11 % (ref 4–12)
NEUTROPHILS # BLD AUTO: 3.44 THOUSANDS/ΜL (ref 1.85–7.62)
NEUTS SEG NFR BLD AUTO: 54 % (ref 43–75)
NRBC BLD AUTO-RTO: 0 /100 WBCS
PLATELET # BLD AUTO: 163 THOUSANDS/UL (ref 149–390)
PMV BLD AUTO: 12.6 FL (ref 8.9–12.7)
POTASSIUM SERPL-SCNC: 3.9 MMOL/L (ref 3.5–5.3)
RBC # BLD AUTO: 2.62 MILLION/UL (ref 3.81–5.12)
SODIUM SERPL-SCNC: 138 MMOL/L (ref 136–145)
TIBC SERPL-MCNC: 225 UG/DL (ref 250–450)
WBC # BLD AUTO: 6.49 THOUSAND/UL (ref 4.31–10.16)

## 2019-10-04 PROCEDURE — 99232 SBSQ HOSP IP/OBS MODERATE 35: CPT | Performed by: INTERNAL MEDICINE

## 2019-10-04 PROCEDURE — 99239 HOSP IP/OBS DSCHRG MGMT >30: CPT | Performed by: NURSE PRACTITIONER

## 2019-10-04 PROCEDURE — 82728 ASSAY OF FERRITIN: CPT | Performed by: INTERNAL MEDICINE

## 2019-10-04 PROCEDURE — 80048 BASIC METABOLIC PNL TOTAL CA: CPT | Performed by: PHYSICIAN ASSISTANT

## 2019-10-04 PROCEDURE — 82948 REAGENT STRIP/BLOOD GLUCOSE: CPT

## 2019-10-04 PROCEDURE — 85025 COMPLETE CBC W/AUTO DIFF WBC: CPT | Performed by: PHYSICIAN ASSISTANT

## 2019-10-04 PROCEDURE — 83550 IRON BINDING TEST: CPT | Performed by: INTERNAL MEDICINE

## 2019-10-04 PROCEDURE — 83540 ASSAY OF IRON: CPT | Performed by: INTERNAL MEDICINE

## 2019-10-04 RX ORDER — CEPHALEXIN 500 MG/1
500 CAPSULE ORAL EVERY 12 HOURS SCHEDULED
Status: DISCONTINUED | OUTPATIENT
Start: 2019-10-04 | End: 2019-10-04 | Stop reason: HOSPADM

## 2019-10-04 RX ORDER — AMLODIPINE BESYLATE 10 MG/1
TABLET ORAL
Qty: 135 TABLET | Refills: 0 | Status: SHIPPED | OUTPATIENT
Start: 2019-10-04 | End: 2019-10-07 | Stop reason: SDUPTHER

## 2019-10-04 RX ORDER — SODIUM BICARBONATE 650 MG/1
1300 TABLET ORAL
Status: DISCONTINUED | OUTPATIENT
Start: 2019-10-04 | End: 2019-10-04 | Stop reason: HOSPADM

## 2019-10-04 RX ORDER — METOPROLOL TARTRATE 50 MG/1
TABLET, FILM COATED ORAL
Qty: 180 TABLET | Refills: 0 | Status: SHIPPED | OUTPATIENT
Start: 2019-10-04 | End: 2020-03-03

## 2019-10-04 RX ORDER — CEPHALEXIN 500 MG/1
500 CAPSULE ORAL EVERY 12 HOURS SCHEDULED
Qty: 6 CAPSULE | Refills: 0 | Status: SHIPPED | OUTPATIENT
Start: 2019-10-04 | End: 2019-10-07

## 2019-10-04 RX ADMIN — LEVOTHYROXINE SODIUM 125 MCG: 125 TABLET ORAL at 05:37

## 2019-10-04 RX ADMIN — SODIUM BICARBONATE 650 MG TABLET 1300 MG: at 14:10

## 2019-10-04 RX ADMIN — BUSPIRONE HYDROCHLORIDE 5 MG: 5 TABLET ORAL at 09:25

## 2019-10-04 RX ADMIN — ASPIRIN 81 MG 81 MG: 81 TABLET ORAL at 09:26

## 2019-10-04 RX ADMIN — CLONIDINE HYDROCHLORIDE 0.3 MG: 0.1 TABLET ORAL at 14:14

## 2019-10-04 RX ADMIN — AMLODIPINE BESYLATE 10 MG: 10 TABLET ORAL at 09:25

## 2019-10-04 RX ADMIN — DULOXETINE HYDROCHLORIDE 60 MG: 60 CAPSULE, DELAYED RELEASE ORAL at 09:24

## 2019-10-04 RX ADMIN — HYDRALAZINE HYDROCHLORIDE 50 MG: 50 TABLET ORAL at 14:15

## 2019-10-04 RX ADMIN — HYDRALAZINE HYDROCHLORIDE 50 MG: 50 TABLET ORAL at 05:38

## 2019-10-04 RX ADMIN — HEPARIN SODIUM 5000 UNITS: 5000 INJECTION INTRAVENOUS; SUBCUTANEOUS at 14:10

## 2019-10-04 RX ADMIN — METOPROLOL TARTRATE 50 MG: 50 TABLET, FILM COATED ORAL at 09:26

## 2019-10-04 RX ADMIN — HEPARIN SODIUM 5000 UNITS: 5000 INJECTION INTRAVENOUS; SUBCUTANEOUS at 05:35

## 2019-10-04 RX ADMIN — SEVELAMER HYDROCHLORIDE 800 MG: 800 TABLET, FILM COATED PARENTERAL at 09:24

## 2019-10-04 RX ADMIN — ROPINIROLE HYDROCHLORIDE 0.25 MG: 0.25 TABLET, FILM COATED ORAL at 09:25

## 2019-10-04 RX ADMIN — SEVELAMER HYDROCHLORIDE 800 MG: 800 TABLET, FILM COATED PARENTERAL at 12:14

## 2019-10-04 RX ADMIN — SERTRALINE HYDROCHLORIDE 200 MG: 100 TABLET ORAL at 09:27

## 2019-10-04 RX ADMIN — CEFAZOLIN SODIUM 1000 MG: 1 SOLUTION INTRAVENOUS at 09:29

## 2019-10-04 RX ADMIN — TACROLIMUS 3 MG: 1 CAPSULE ORAL at 09:14

## 2019-10-04 RX ADMIN — FERROUS SULFATE TAB 325 MG (65 MG ELEMENTAL FE) 325 MG: 325 (65 FE) TAB at 09:26

## 2019-10-04 RX ADMIN — FOLIC ACID 1000 MCG: 1 TABLET ORAL at 09:29

## 2019-10-04 RX ADMIN — PREDNISONE 5 MG: 5 TABLET ORAL at 09:27

## 2019-10-04 RX ADMIN — CLONIDINE HYDROCHLORIDE 0.3 MG: 0.1 TABLET ORAL at 05:38

## 2019-10-04 NOTE — SOCIAL WORK
Pt given outpatient PT script  Patient discharging home  Transportation provided by   No other CM needs

## 2019-10-04 NOTE — PROGRESS NOTES
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 )    Essential hypertension    Anemia    Status post kidney transplant    Major depressive disorder, recurrent episode, in full remission ([facility] )    Multiple myeloma not having achieved remission ([facility] )    Acute renal failure superimposed on stage 4 chronic kidney disease (HCC)    Metabolic acidosis    1  EDUARDO on CKD stage 3/4, stablized  Likely 2/2 to prerenal complicated by symptomatic E coli UTI  -s/p L renal transplant (followed by Dr Clifford Salvage out pt), chronically immunosuppressed, failed Pancreatic transplant, confirmed Multiple Myeloma (on chemo, held while during this admission)  -Pt has sig Hx of recurrent UTI  No significant findings for stone, mass, fluid collection of U/S of kidney ( Baseline Cr 1 9-2 1 ), currently elevated: 2 75-->2 54--> 2 51 today  -started IV Keflex (total for 7days) will switch to PO today and likely d/c home today as there are no systemic signs of infection (fever, WBC, hypotension) as well as pt admits to overall  improvement   -Continue immunosuppressive medications (Tacrolimus and prograft), goal Tacrolimus level of 3 6  Would like to be at goal of 4-6  -Pt seen by Urology team; question remains if pt should be placed on PPx abx due to frequent recurrence of UTI and hosp admission: therefore pt will have Out pt f/u with Urology for cystoscopy and retrograde pyelography and full assessment prior to any long term abx  -Continue IV/ oral hydration  -ID and Urology inputs appreciated  -F/u with Heme/ onc out pt     2   Metabolic Acidosis, improved  Anion gap closed from 10--> 8 today, with improved Bicarb from 18--> 22 today    3  HTN, chronic, elevated  Last BP readings have been elevated at 908-506 systolic while on several BP meds: -outpatient regimen includes amlodipine 10 mg in a m , 5 mg in p m , clonidine 0 3 mg p o  T i d , doxazosin 4 mg daily, hydralazine 50 mg t i d , metoprolol 50 mg b i d   -would like better control, however avoid hypotension   -managed per primary team    4  Other medical problems per primary team  · HLD  · Constipation   · DM  · Restless leg syndrome  · Anxiety/ Depression   · GERD      SUBJECTIVE     Patient seen and examined  No acute events overnight  States she is feeling well and "feels like normal self again " Denies Sob, CP, HA, abd/ flank pain/ suprapubic pain, dysuria, fevers, chills, changes in urine, pain/ swelling in legs, confusion  Pt states she promises to increase fluid intake which she admits to being a "poor water drinker " States she will f/u w urology upon D/c  States she is ready to go home  ROS negative for 12 organ systems except per HPI  OBJECTIVE     Vitals:    10/04/19 0537 10/04/19 0600 10/04/19 0721 10/04/19 0721   BP: 167/63  170/63 170/63   BP Location:    Right arm   Pulse:    61   Resp:   19 19   Temp:   97 9 °F (36 6 °C) 97 9 °F (36 6 °C)   TempSrc:       SpO2:    95%   Weight:  102 kg (225 lb 3 2 oz)     Height:          Temperature:   Temp (24hrs), Av 7 °F (36 5 °C), Min:97 3 °F (36 3 °C), Max:97 9 °F (36 6 °C)    Temperature: 97 9 °F (36 6 °C)  Intake & Output:  I/O       10/02 0701 - 10/03 0700 10/03 0701 - 10/04 0700 10/04 0701 - 10/05 07    P  O  360 900     I V  (mL/kg) 520 (5 2)      IV Piggyback 50      Total Intake(mL/kg) 930 (9 3) 900 (8 8)     Urine (mL/kg/hr) 1200 (0 5) 2975 (1 2)     Total Output 1200 2975     Net -270 -                Weights:   IBW: 63 9 kg    Body mass index is 34 24 kg/m²    Weight (last 2 days)     Date/Time   Weight    10/04/19 0600   102 (225 2)    10/03/19 0534   100 (220 46)    10/02/19 0548   100 (221 34)            Physical Exam   Constitutional: She is oriented to person, place, and time  She appears well-developed and well-nourished  No distress  HENT:   Head: Normocephalic and atraumatic  Right Ear: External ear normal    Left Ear: External ear normal    Nose: Nose normal    Mouth/Throat: Oropharynx is clear and moist    Eyes: Pupils are equal, round, and reactive to light  Conjunctivae and EOM are normal    Neck: Normal range of motion  Neck supple  No JVD present  No tracheal deviation present  Cardiovascular: Normal rate, regular rhythm, normal heart sounds and intact distal pulses  Exam reveals no gallop and no friction rub  No murmur heard  Pulmonary/Chest: Effort normal and breath sounds normal  No stridor  No respiratory distress  She has no wheezes  She has no rales  Abdominal: Soft  Bowel sounds are normal  She exhibits no distension and no mass  There is no tenderness  There is no guarding  Musculoskeletal: Normal range of motion  She exhibits edema (TRACE B/L PITTING EDEMA IN LE)  She exhibits no tenderness or deformity  Neurological: She is alert and oriented to person, place, and time  She displays normal reflexes  No cranial nerve deficit  She exhibits normal muscle tone  Coordination normal    Skin: Skin is warm and dry  Capillary refill takes less than 2 seconds  No rash noted  She is not diaphoretic  No erythema  No pallor  Psychiatric: She has a normal mood and affect  Her behavior is normal  Judgment and thought content normal    Nursing note and vitals reviewed  LABORATORY DATA     Labs: I have personally reviewed pertinent reports      Results from last 7 days   Lab Units 10/04/19  0529 10/03/19  0521 10/01/19  1222   WBC Thousand/uL 6 49 6 53 8 58   HEMOGLOBIN g/dL 7 6* 7 4* 8 4*   HEMATOCRIT % 24 9* 24 8* 27 6*   PLATELETS Thousands/uL 163 161 168   NEUTROS PCT % 54 53 74   MONOS PCT % 11 9 5      Results from last 7 days   Lab Units 10/04/19  0529 10/03/19  5459 10/02/19  0444 10/01/19  1222   POTASSIUM mmol/L 3 9 4 0 4 5 4 6   CHLORIDE mmol/L 108 111* 117* 118*   CO2 mmol/L 22 18* 15* 15*   BUN mg/dL 37* 40* 36* 38*   CREATININE mg/dL 2 51* 2 54* 2 75* 2 70*   CALCIUM mg/dL 8 4 8 3 8 7 9 2   ALK PHOS U/L  --   --   --  119*   ALT U/L  --   --   --  15   AST U/L  --   --   --  8     Results from last 7 days   Lab Units 10/02/19  0444   MAGNESIUM mg/dL 2 1     Results from last 7 days   Lab Units 10/02/19  0444   PHOSPHORUS mg/dL 4 4          Results from last 7 days   Lab Units 10/01/19  1222   LACTIC ACID mmol/L 0 5           IMAGING & DIAGNOSTIC TESTING     Radiology Results: I have personally reviewed pertinent reports  Us Transplant Kidney With Doppler    Result Date: 10/1/2019  Impression: Arterial resistive indices within the transplant lower pole renal parenchyma and transplant renal artery measuring between 0 8 and 0 9, previously resistive indices measuring up to 0 8 in early September  Transplant renal artery and transplant renal vein are patent  Spectral analysis of the transplant renal artery does not suggest stenosis  Findings are more concerning for developing transplant rejection  No hydronephrosis  Simple renal cysts within both the left pelvic transplant kidney and right native kidney  Left native kidney not reliably identified,  known to be significantly atrophic  The study was marked in [facility] for immediate notification  Workstation performed: [id number]     Other Diagnostic Testing: I have personally reviewed pertinent reports        ACTIVE MEDICATIONS     Current Facility-Administered Medications   Medication Dose Route Frequency    acetaminophen (TYLENOL) tablet 650 mg  650 mg Oral Q6H PRN    amLODIPine (NORVASC) tablet 10 mg  10 mg Oral QAM    amLODIPine (NORVASC) tablet 5 mg  5 mg Oral QPM    ARIPiprazole (ABILIFY) tablet 30 mg  30 mg Oral HS    aspirin chewable tablet 81 mg  81 mg Oral Daily    bisacodyl (DULCOLAX) EC tablet 5 mg  5 mg Oral Daily PRN    busPIRone (BUSPAR) tablet 5 mg  5 mg Oral BID    calcium carbonate (TUMS) chewable tablet 1,000 mg  1,000 mg Oral Daily PRN    ceFAZolin (ANCEF) IVPB (premix) 1,000 mg  1,000 mg Intravenous Q12H    cloNIDine (CATAPRES) tablet 0 3 mg  0 3 mg Oral Q8H [facility]    doxazosin (CARDURA) tablet 4 mg  4 mg Oral HS    DULoxetine (CYMBALTA) delayed release capsule 60 mg  60 mg Oral BID    ferrous sulfate tablet 325 mg  325 mg Oral Daily With Breakfast    folic acid (FOLVITE) tablet 1,000 mcg  1,000 mcg Oral Daily    heparin (porcine) subcutaneous injection 5,000 Units  5,000 Units Subcutaneous Q8H [facility]    hydrALAZINE (APRESOLINE) tablet 50 mg  50 mg Oral Q8H [facility]    insulin glargine (LANTUS) subcutaneous injection 1 Units 0 01 mL  1 Units Subcutaneous HS    insulin lispro (HumaLOG) 100 units/mL subcutaneous injection 1-5 Units  1-5 Units Subcutaneous HS    insulin lispro (HumaLOG) 100 units/mL subcutaneous injection 1-6 Units  1-6 Units Subcutaneous TID AC    levothyroxine tablet 125 mcg  125 mcg Oral Early Morning    LORazepam (ATIVAN) tablet 2 mg  2 mg Oral TID PRN    metoprolol tartrate (LOPRESSOR) tablet 50 mg  50 mg Oral Q12H [facility]    ondansetron (ZOFRAN) injection 4 mg  4 mg Intravenous Q6H PRN    pravastatin (PRAVACHOL) tablet 80 mg  80 mg Oral Daily With Dinner    predniSONE tablet 5 mg  5 mg Oral Daily    rOPINIRole (REQUIP) tablet 0 25 mg  0 25 mg Oral BID    sertraline (ZOLOFT) tablet 200 mg  200 mg Oral Daily    sevelamer (RENAGEL) tablet 800 mg  800 mg Oral TID With Meals    sodium bicarbonate 150 mEq in dextrose 5 % 1,000 mL infusion  70 mL/hr Intravenous Continuous    tacrolimus (PROGRAF) capsule 3 mg  3 mg Oral QAM    tacrolimus (PROGRAF) capsule 3 mg  3 mg Oral QPM     VTE Pharmacologic Prophylaxis: Heparin  VTE Mechanical Prophylaxis: sequential compression device    ==  Autumn Wayne MD  Resident, PGY-1  [facility]

## 2019-10-04 NOTE — PLAN OF CARE
Problem: Potential for Falls  Goal: Patient will remain free of falls  Description  INTERVENTIONS:  - Assess patient frequently for physical needs  -  Identify cognitive and physical deficits and behaviors that affect risk of falls    -  [place] fall precautions as indicated by assessment   - Educate patient/family on patient safety including physical limitations  - Instruct patient to call for assistance with activity based on assessment  - Modify environment to reduce risk of injury  - Consider OT/PT consult to assist with strengthening/mobility  Outcome: Completed     Problem: PAIN - ADULT  Goal: Verbalizes/displays adequate comfort level or baseline comfort level  Description  Interventions:  - Encourage patient to monitor pain and request assistance  - Assess pain using appropriate pain scale  - Administer analgesics based on type and severity of pain and evaluate response  - Implement non-pharmacological measures as appropriate and evaluate response  - Consider cultural and social influences on pain and pain management  - Notify physician/advanced practitioner if interventions unsuccessful or patient reports new pain  Outcome: Completed     Problem: INFECTION - ADULT  Goal: Absence or prevention of progression during hospitalization  Description  INTERVENTIONS:  - Assess and monitor for signs and symptoms of infection  - Monitor lab/diagnostic results  - Monitor all insertion sites, i e  indwelling lines, tubes, and drains  - Monitor endotracheal if appropriate and nasal secretions for changes in amount and color  - [place] appropriate cooling/warming therapies per order  - Administer medications as ordered  - Instruct and encourage patient and family to use good hand hygiene technique  - Identify and instruct in appropriate isolation precautions for identified infection/condition  Outcome: Completed  Goal: Absence of fever/infection during neutropenic period  Description  INTERVENTIONS:  - Monitor WBC    Outcome: Completed     Problem: SAFETY ADULT  Goal: Maintain or return to baseline ADL function  Description  INTERVENTIONS:  -  Assess patient's ability to carry out ADLs; assess patient's baseline for ADL function and identify physical deficits which impact ability to perform ADLs (bathing, care of mouth/teeth, toileting, grooming, dressing, etc )  - Assess/evaluate cause of self-care deficits   - Assess range of motion  - Assess patient's mobility; develop plan if impaired  - Assess patient's need for assistive devices and provide as appropriate  - Encourage maximum independence but intervene and supervise when necessary  - Involve family in performance of ADLs  - Assess for home care needs following discharge   - Consider OT consult to assist with ADL evaluation and planning for discharge  - Provide patient education as appropriate  Outcome: Completed  Goal: Maintain or return mobility status to optimal level  Description  INTERVENTIONS:  - Assess patient's baseline mobility status (ambulation, transfers, stairs, etc )    - Identify cognitive and physical deficits and behaviors that affect mobility  - Identify mobility aids required to assist with transfers and/or ambulation (gait belt, sit-to-stand, lift, walker, cane, etc )  - [employer] fall precautions as indicated by assessment  - Record patient progress and toleration of activity level on Mobility SBAR; progress patient to next Phase/Stage  - Instruct patient to call for assistance with activity based on assessment  - Consider rehabilitation consult to assist with strengthening/weightbearing, etc   Outcome: Completed     Problem: DISCHARGE PLANNING  Goal: Discharge to home or other facility with appropriate resources  Description  INTERVENTIONS:  - Identify barriers to discharge w/patient and caregiver  - Arrange for needed discharge resources and transportation as appropriate  - Identify discharge learning needs (meds, wound care, etc )  - Arrange for interpretive services to assist at discharge as needed  - Refer to Case Management Department for coordinating discharge planning if the patient needs post-hospital services based on physician/advanced practitioner order or complex needs related to functional status, cognitive ability, or social support system  Outcome: Completed     Problem: Knowledge Deficit  Goal: Patient/family/caregiver demonstrates understanding of disease process, treatment plan, medications, and discharge instructions  Description  Complete learning assessment and assess knowledge base    Interventions:  - Provide teaching at level of understanding  - Provide teaching via preferred learning methods  Outcome: Completed

## 2019-10-04 NOTE — ASSESSMENT & PLAN NOTE
· Kidney and pancreas transplant in 1998  · Transplant kidney US: Arterial resistive indices within transplant lower pole renal parenchyma and transplant renal artery measuring between 0 8 and 0 9, previously resistive indices measuring up to 0 8 in early September  Transplant renal artery dose not suggest stenosis    Findings are more concerning for developing transplant rejection   · Nephrology following, appreciate input  · Continue prednisone and Prograf - level normal   · Check level in 1 week

## 2019-10-04 NOTE — DISCHARGE SUMMARY
Discharge- Palomino Loss 1965, 53 y o  female MRN: [id number]    Unit/Bed#: -01 Encounter: [id number]    Primary Care Provider: Annie Angeles MD   Date and time admitted to hospital: 10/1/2019 12:03 PM        Metabolic acidosis  Assessment & Plan  · Likely in setting of acute renal failure, resolved  · On oral bicarb as an outpatient continue after discharge  · Follow-up with Nephrology as an outpatient  · BMP in 1 week    Acute renal failure superimposed on stage 4 chronic kidney disease ([facility] [address] )  Assessment & Plan  · Nephrology following, appreciate input  She is s/p kidney and pancreas transplant in 1998  · EDUARDO on CKD stage 4  Previous baseline Cr 1 9-2 1  Follows with Dr Zhen Clark outpatient  · Was recently admitted 9/13/19 with EDUARDO at that time, peak Cr that admission 4 4  Improved to 2 8 on discharge  · Improving, Cr 2 75 --> 2 51 today   · Lasix continues to be held  · Continue oral bicarb  · Transplant kidney US: Arterial resistive indices within transplant lower pole renal parenchyma and transplant renal artery measuring between 0 8 and 0 9, previously resistive indices measuring up to 0 8 in early September  Transplant renal artery dose not suggest stenosis  Findings are more concerning for developing transplant rejection     Multiple myeloma not having achieved remission [facility])  Assessment & Plan  · Follows hematology outpatient (Dr Leila Wilburn)  · Treatment currently on hold given infection and renal function     Major depressive disorder, recurrent episode, in full remission ([facility] [address] )  Assessment & Plan  · Continue Abilify, BuSpar, Cymbalta, Zoloft, p r n  Ativan    Status post kidney transplant  Assessment & Plan  · Kidney and pancreas transplant in 1998  · Transplant kidney US: Arterial resistive indices within transplant lower pole renal parenchyma and transplant renal artery measuring between 0 8 and 0 9, previously resistive indices measuring up to 0 8 in early September    Transplant renal artery dose not suggest stenosis  Findings are more concerning for developing transplant rejection   · Nephrology following, appreciate input  · Continue prednisone and Prograf - level normal   · Check level in 1 week    Anemia  Assessment & Plan  · Chronic anemia, Hgb 7 6 today  · Pt asx, likely due to renal failure   · Outpatient monitoring  · Iron supplement     Essential hypertension  Assessment & Plan  · Continue amlodipine 10 mg every morning, 5 mg every evening  Clonidine 0 3 mg TID, hydralazine 50 mg TID, metoprolol 50 mg q12hr , doxazosin 4 mg daily    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 [facility])  Assessment & Plan  Lab Results   Component Value Date    HGBA1C 5 1 09/09/2019     Recent Labs     10/03/19  1622 10/03/19  2108 10/04/19  0724 10/04/19  1114   POCGLU 149* 138 128 101     Blood Sugar Average: Last 72 hrs:  · Well controlled evidenced by A1C  · Patient takes toujeo 1 unit qhs - continue  · SSI, accu checks  (P) 132 5    Acquired hypothyroidism  Assessment & Plan  · Continue levothyroxine     * UTI (urinary tract infection)  Assessment & Plan  · Patient reports to dysuria, increased frequency/incontinence x 2 days  Now resolved  · UA with 30-50 WBC, innumerable bacteria, large leukocytes, positive nitrite   · IV abx: ceftriaxone, changed to oral Keflex  · Urine culture so far pos for e coli, sensitivities reviewed  · BC x 2 NTD  · Unfortunately with re-admissions with recurrent UTI  appreciate ID input  · Appreciate Urology consultation follow-up as an outpatient  · Bladder scan neg      Acute metabolic encephalopathyresolved as of 10/2/2019  Assessment & Plan  · Pt reports to some confusion and lethargy associated with UTI    States, for instance, she could not recall her phone number  · Supportive care  · Treat UTI   · This has resolved         Discharging Physician / Practitioner: MER Monroy  PCP: Mike Gross MD  Admission Date: Admission Orders (From admission, onward)     Ordered        10/01/19 1519  Inpatient Admission  Once                   Discharge Date: 10/04/19    Resolved Problems  Date Reviewed: 10/4/2019          Resolved    Acute metabolic encephalopathy 61/0/4255     Resolved by  Christina Coleman PA-C          Consultations During Hospital Stay:  · Nephrology  · Urology  · Infectious Disease    Procedures Performed:   ·     Significant Findings / Test Results:   Us Transplant Kidney With Doppler    Result Date: 10/1/2019  Narrative: RENAL ULTRASOUND INDICATION: 79-year-old female with past medical history of chronic kidney disease  Urinary incontinence and dysuria  Lower abdominal/suprapubic pain and urinary frequency  Evaluate Left pelvic Transplant kidney (circa 1998)  COMPARISON: September 9, 2019 February 2, 2018 TECHNIQUE:   Ultrasound of the retroperitoneum was performed with a curvilinear transducer utilizing volumetric sweeps and still imaging techniques  FINDINGS: KIDNEYS: Right native kidney Atrophic measuring 5 9 x 2 4 cm  Cortex measures 0 4 cm  Cortical Echogenicity is elevated  No suspicious masses detected  Lower pole simple cyst measuring 1 4 cm, not visible on prior exam but previously seen by CT in February 2018 measuring up to 0 9 cm  No hydronephrosis  No shadowing calculi  No perinephric fluid collections  Left native kidney Left kidney is difficult to distinguish from retroperitoneal fat, and could not be accurately measured  No hydronephrosis or cystic mass identified  Left pelvic transplant kidney Normal size measuring 13 3 x 6 4 cm  Cortex measuring 1 9 cm  Normal cortical echogenicity  Outer contour is slightly lobular  No suspicious masses detected  Upper pole simple cyst measuring 1 8 cm at cortical medullary junction  No hydronephrosis  No shadowing calculi  No perinephric fluid collections  Upper pole arterial resistive indices = 0 6  Interpolar arterial resistive indices = 0 7   Lower pole arterial resistive indices = 0 8  Renal vein is patent  Transplant renal artery demonstrates peak systolic velocities of approximately 160 cm/s without spectral widening  Arterial resistive indices between 0 8 and 0 9  URETERS: Nonvisualized  BLADDER: Incompletely distended  No focal thickening or mass lesions  No ureteral jets were identified  Impression: Arterial resistive indices within the transplant lower pole renal parenchyma and transplant renal artery measuring between 0 8 and 0 9, previously resistive indices measuring up to 0 8 in early September  Transplant renal artery and transplant renal vein are patent  Spectral analysis of the transplant renal artery does not suggest stenosis  Findings are more concerning for developing transplant rejection  No hydronephrosis  Simple renal cysts within both the left pelvic transplant kidney and right native kidney  Left native kidney not reliably identified,  known to be significantly atrophic  The study was marked in [facility] for immediate notification  Workstation performed: [id number]     ·     Incidental Findings:   ·      Test Results Pending at Discharge (will require follow up): · None     Outpatient Tests Requested:  · BMP and Prograf level in 1 week    Complications:  None    Reason for Admission:  UTI    Hospital Course: Lolis Mcmillan is a 53 y o  female patient who originally presented to the hospital on 10/1/2019 due to dysuria and frequency  She has history of stage 4 kidney disease status post kidney and pancreas transplant along with type 1 diabetes anemia hypertension multiple myeloma depression  She was also experiencing fatigue and confusion  She states this was her usual presentation when she gets a urinary tract infection  Her most recent 1 being in September of 2019  She started with symptoms 2 days prior to coming into the ED she did report chills and fever and suprapubic discomfort    She was admitted for further workup and IV antibiotics  Prior cultures from her UTI showed sensitive to E coli therefore she was started on cefepime  Nephrology was consulted to monitor her acute renal failure on stage 4 chronic kidney disease  She was noted to have acidosis which was thought to be secondary to acute renal failure on admission likely due to ATN versus prerenal azotemia from dehydration  She was given IV fluids and started on bicarbonate drip  She was maintained on tacrolimus, at time of discharge a level was recommended to have completed 1 week after discharge  In addition a BMP was ordered to be completed 1 week after discharge and patient was aware to have these completed  Her blood sugars remained stable within goal and A1c which was checked was 5 1% in September of 2019  She is noted to have chronic anemia and hemoglobin remained stable at 7 6 to 7 8  She did undergo a ultrasound of her transplant kidney with Doppler findings did not show obstruction of transplanted kidney but were concerning for developing transplant rejection  Urology was consulted regarding her UTI  They recommended to continue to encourage oral hydration and finish the course of antibiotics  They also further recommended outpatient workup with possible cystoscopy and retrograde pyelogram the to evaluate for absence or presence of reflux  Infectious Disease did follow patient throughout her hospitalization and once her sensitivities were resulted she was changed to oral antibiotics and discharged home on Keflex to finish a total of 7 days treatment  She was noted to be encephalopathic on admission but this resolved with treatment of her UTI  At time of discharge she was to continue on oral bicarbonate, continue her tacrolimus, and finish up Keflex for 3 more days to complete a full course  She was advised to follow up with Urology as an outpatient along with Nephrology           Please see above list of diagnoses and related plan for additional information  Condition at Discharge: stable     Discharge Day Visit / Exam:     Subjective:  Denies headache dizziness nausea vomiting chest pain shortness of breath dysuria diarrhea  Vitals: Blood Pressure: 147/50 (10/04/19 0926)  Pulse: 62 (10/04/19 0926)  Temperature: 97 9 °F (36 6 °C) (10/04/19 0721)  Temp Source: Oral (10/03/19 0725)  Respirations: 19 (10/04/19 0721)  Height: 5' 8" (172 7 cm) (10/01/19 1714)  Weight - Scale: 102 kg (225 lb 3 2 oz) (10/04/19 0600)  SpO2: 95 % (10/04/19 0721)  Exam:   Physical Exam   Constitutional: She is oriented to person, place, and time  She appears well-developed and well-nourished  No distress  HENT:   Head: Normocephalic and atraumatic  Mouth/Throat: Oropharynx is clear and moist    Neck: Neck supple  Cardiovascular: Normal rate and regular rhythm  Pulmonary/Chest: Effort normal and breath sounds normal  No respiratory distress  Abdominal: Soft  Bowel sounds are normal  She exhibits no distension  There is no tenderness  Musculoskeletal: Normal range of motion  She exhibits no edema  Neurological: She is alert and oriented to person, place, and time  No cranial nerve deficit  Skin: Skin is warm and dry  Capillary refill takes less than 2 seconds  Psychiatric: She has a normal mood and affect  Her behavior is normal    Vitals reviewed  Discussion with Family:  Declined call to family    Discharge instructions/Information to patient and family:   See after visit summary for information provided to patient and family  Provisions for Follow-Up Care:  See after visit summary for information related to follow-up care and any pertinent home health orders  Disposition:     Home    For Discharges to [facility]:   · Not Applicable to this Patient - Not Applicable to this Patient    Planned Readmission:  Not anticipated     Discharge Statement:  I spent 40 minutes discharging the patient   This time was spent on the day of discharge  I had direct contact with the patient on the day of discharge  Greater than 50% of the total time was spent examining patient, answering all patient questions, arranging and discussing plan of care with patient as well as directly providing post-discharge instructions  Additional time then spent on discharge activities  Discharge Medications:  See after visit summary for reconciled discharge medications provided to patient and family        ** Please Note: This note has been constructed using a voice recognition system **

## 2019-10-04 NOTE — RESTORATIVE TECHNICIAN NOTE
Restorative Specialist Mobility Note       Activity: Ambulate in barron, Ambulate in room, Bathroom privileges, Chair, Dangle, Stand at bedside(Educated/encouraged pt to ambulate with assistance 3-4 x's/day   Pt callbell, phone/tray within reach )     Assistive Device: Front wheel walker    Caro STONE, Restorative Technician, [facility]

## 2019-10-04 NOTE — ASSESSMENT & PLAN NOTE
· Follows hematology outpatient (Dr Escobar Branch)  · Treatment currently on hold given infection and renal function

## 2019-10-04 NOTE — ASSESSMENT & PLAN NOTE
· Patient reports to dysuria, increased frequency/incontinence x 2 days  Now resolved  · UA with 30-50 WBC, innumerable bacteria, large leukocytes, positive nitrite   · IV abx: ceftriaxone, changed to oral Keflex  · Urine culture so far pos for e coli, sensitivities reviewed  · BC x 2 NTD  · Unfortunately with re-admissions with recurrent UTI  appreciate ID input     · Appreciate Urology consultation follow-up as an outpatient  · Bladder scan neg

## 2019-10-04 NOTE — ASSESSMENT & PLAN NOTE
· Likely in setting of acute renal failure, resolved  · On oral bicarb as an outpatient continue after discharge  · Follow-up with Nephrology as an outpatient  · BMP in 1 week

## 2019-10-04 NOTE — ASSESSMENT & PLAN NOTE
· Chronic anemia, Hgb 7 6 today  · Pt asx, likely due to renal failure   · Outpatient monitoring  · Iron supplement

## 2019-10-04 NOTE — PLAN OF CARE
Problem: Prexisting or High Potential for Compromised Skin Integrity  Goal: Skin integrity is maintained or improved  Description  INTERVENTIONS:  - Identify patients at risk for skin breakdown  - Assess and monitor skin integrity  - Assess and monitor nutrition and hydration status  - Monitor labs   - Assess for incontinence   - Turn and reposition patient  - Assist with mobility/ambulation  - Relieve pressure over bony prominences  - Avoid friction and shearing  - Provide appropriate hygiene as needed including keeping skin clean and dry  - Evaluate need for skin moisturizer/barrier cream  - Collaborate with interdisciplinary team   - Patient/family teaching  - Consider wound care consult   Outcome: Adequate for Discharge

## 2019-10-04 NOTE — ASSESSMENT & PLAN NOTE
· Nephrology following, appreciate input  She is s/p kidney and pancreas transplant in 1998  · EDUARDO on CKD stage 4  Previous baseline Cr 1 9-2 1  Follows with Dr Marciano Miranda outpatient  · Was recently admitted 9/13/19 with EDUARDO at that time, peak Cr that admission 4 4  Improved to 2 8 on discharge  · Improving, Cr 2 75 --> 2 51 today   · Lasix continues to be held  · Continue oral bicarb  · Transplant kidney US: Arterial resistive indices within transplant lower pole renal parenchyma and transplant renal artery measuring between 0 8 and 0 9, previously resistive indices measuring up to 0 8 in early September  Transplant renal artery dose not suggest stenosis    Findings are more concerning for developing transplant rejection

## 2019-10-04 NOTE — ASSESSMENT & PLAN NOTE
Lab Results   Component Value Date    HGBA1C 5 1 09/09/2019     Recent Labs     10/03/19  1622 10/03/19  2108 10/04/19  0724 10/04/19  1114   POCGLU 149* 138 128 101     Blood Sugar Average: Last 72 hrs:  · Well controlled evidenced by A1C  · Patient takes toujeo 1 unit qhs - continue  · SSI, accu checks  (P) 132 5

## 2019-10-04 NOTE — DISCHARGE INSTR - AVS FIRST PAGE
Encourage you to drink fluids often, stay hydrated    Follow up with urology    Have BMP completed around 10/11 and prograf trough level

## 2019-10-04 NOTE — PROGRESS NOTES
Progress Note - Infectious Disease   Lance Gill 53 y o  female MRN: [id number]  Unit/Bed#: -01 Encounter: [id number]      Impression/Recommendations:  1  Symptomatic UTI  Israel Forrest findings for UTI are not impressive  Coco Ivy, given history of renal transplant and symptoms, agree with treatment for presumptive UTI   Patient has no fever or leukocytosis   She is systemically well, without evidence of sepsis or systemic toxicity  Urine culture with cefazolin susceptible E coli  Blood cultures remain negative  Doubt that antibiotic suppression will be effective for recurrent UTI  It would be best to treat patient quickly with each recurrence  This way, risk of resistance development is less  Change antibiotic to p o  Keflex  Treat x 7 days total, another 3 days      2  EDUARDO, superimposed on CKD stage 4  Ultrasound without obstruction of transplanted kidney  Creatinine is improved  Antibiotic dosages adjusted accordingly  Monitor creatinine      3  Encephalopathy, POA   This is most likely secondary to a KI and possible UTI   Mental status is much improved, at baseline   No clinical signs of CNS infection  Monitor mental status      4  Status post distant kidney and pancreas transplantation   Patient is on stable anti rejection regimen      5  Multiple myeloma   Chemotherapy is currently on hold   Patient is not neutropenic      Discussed with patient in detail regarding the above plan  Discussed with Dr Robert Power from Nephrology service earlier  Okay for discharge from ID viewpoint      Antibiotics:  Cefazolin  Antibiotic  # 4      Subjective:  Patient feels well  Energy back to normal    Mental status is also good, back to baseline  No suprapubic pain  No dysuria  Temperature stays down  No chills  She is tolerating antibiotic well  No nausea, vomiting or diarrhea      Objective:  Vitals:  Temp:  [97 3 °F (36 3 °C)-97 9 °F (36 6 °C)] 97 9 °F (36 6 °C)  HR:  [61-66] 62  Resp:  [18-19] 19  BP: (101-182)/(50-81) 147/50  SpO2:  [95 %-100 %] 95 %  Temp (24hrs), Av 7 °F (36 5 °C), Min:97 3 °F (36 3 °C), Max:97 9 °F (36 6 °C)  Current: Temperature: 97 9 °F (36 6 °C)    Physical Exam:     General: Awake, alert, cooperative, no distress  Neck:  Supple  No mass  No lymphadenopathy  Lungs: Expansion symmetric, no rales, no wheezing, respirations unlabored  Heart:  Regular rate and rhythm, S1 and S2 normal, no murmur  Abdomen: Soft, nondistended, non-tender, bowel sounds active all four quadrants,        no masses, no organomegaly  Extremities: No edema  No erythema/warmth  No ulcer  Nontender to palpation  Skin:  No rash  Neuro: Moves all extremities  Invasive Devices     Peripheral Intravenous Line            Peripheral IV 10/03/19 Left;Proximal;Ventral (anterior) Forearm 1 day                Labs studies:   I have personally reviewed pertinent labs  Results from last 7 days   Lab Units 10/04/19  0529 10/03/19  0521 10/02/19  0444 10/01/19  1222   POTASSIUM mmol/L 3 9 4 0 4 5 4 6   CHLORIDE mmol/L 108 111* 117* 118*   CO2 mmol/L 22 18* 15* 15*   BUN mg/dL 37* 40* 36* 38*   CREATININE mg/dL 2 51* 2 54* 2 75* 2 70*   EGFR ml/min/1 73sq m  19 19   CALCIUM mg/dL 8 4 8 3 8 7 9 2   AST U/L  --   --   --  8   ALT U/L  --   --   --  15   ALK PHOS U/L  --   --   --  119*     Results from last 7 days   Lab Units 10/04/19  0529 10/03/19  0521 10/01/19  1222   WBC Thousand/uL 6 49 6 53 8 58   HEMOGLOBIN g/dL 7 6* 7 4* 8 4*   PLATELETS Thousands/uL 163 161 168     Results from last 7 days   Lab Units 10/01/19  1416 10/01/19  1308 10/01/19  1253   BLOOD CULTURE  No Growth at 48 hrs  No Growth at 48 hrs  --    URINE CULTURE   --   --  >100,000 cfu/ml Escherichia coli*       Imaging Studies:   I have personally reviewed pertinent imaging study reports and images in PACS  EKG, Pathology, and Other Studies:   I have personally reviewed pertinent reports

## 2019-10-06 LAB
BACTERIA BLD CULT: NORMAL
BACTERIA BLD CULT: NORMAL

## 2019-10-07 ENCOUNTER — EPISODE CHANGES (OUTPATIENT)
Dept: CASE MANAGEMENT | Facility: HOSPITAL | Age: 54
End: 2019-10-07

## 2019-10-07 ENCOUNTER — DOCUMENTATION (OUTPATIENT)
Dept: PSYCHIATRY | Facility: CLINIC | Age: 54
End: 2019-10-07

## 2019-10-07 ENCOUNTER — TELEPHONE (OUTPATIENT)
Dept: NEPHROLOGY | Facility: CLINIC | Age: 54
End: 2019-10-07

## 2019-10-07 NOTE — TELEPHONE ENCOUNTER
Left a message for the patient to remind her of her appointment on 10/11 with Lola Frey, and also there is blood work to be done for the appointment

## 2019-10-07 NOTE — PROGRESS NOTES
Treatment Plan not completed within required time limits due to:  Zachary Dumont   cancelled appointment  on 10/7/2019 (called to cancel appointment just released from hospital  Pascale Rutledge need to schedule around chemotherapy)

## 2019-10-07 NOTE — TELEPHONE ENCOUNTER
Pt discharged from hospital  Called and LM to call our office back to schedule 4 week follow up   Left office number in message

## 2019-10-08 ENCOUNTER — LAB (OUTPATIENT)
Dept: LAB | Age: 54
End: 2019-10-08
Payer: MEDICARE

## 2019-10-08 ENCOUNTER — TELEPHONE (OUTPATIENT)
Dept: NEPHROLOGY | Facility: CLINIC | Age: 54
End: 2019-10-08

## 2019-10-08 ENCOUNTER — OFFICE VISIT (OUTPATIENT)
Dept: HEMATOLOGY ONCOLOGY | Facility: CLINIC | Age: 54
End: 2019-10-08
Payer: MEDICARE

## 2019-10-08 ENCOUNTER — TELEPHONE (OUTPATIENT)
Dept: HEMATOLOGY ONCOLOGY | Facility: CLINIC | Age: 54
End: 2019-10-08

## 2019-10-08 ENCOUNTER — TELEPHONE (OUTPATIENT)
Dept: ENDOCRINOLOGY | Facility: CLINIC | Age: 54
End: 2019-10-08

## 2019-10-08 VITALS
TEMPERATURE: 98.3 F | HEIGHT: 68 IN | SYSTOLIC BLOOD PRESSURE: 140 MMHG | OXYGEN SATURATION: 94 % | WEIGHT: 224 LBS | HEART RATE: 59 BPM | BODY MASS INDEX: 33.95 KG/M2 | RESPIRATION RATE: 16 BRPM | DIASTOLIC BLOOD PRESSURE: 64 MMHG

## 2019-10-08 DIAGNOSIS — Z94.0 TRANSPLANTED KIDNEY: ICD-10-CM

## 2019-10-08 DIAGNOSIS — C90.00 MULTIPLE MYELOMA NOT HAVING ACHIEVED REMISSION (HCC): Primary | ICD-10-CM

## 2019-10-08 DIAGNOSIS — N18.4 CHRONIC KIDNEY DISEASE, STAGE 4 (SEVERE) (HCC): ICD-10-CM

## 2019-10-08 DIAGNOSIS — E03.9 ACQUIRED HYPOTHYROIDISM: ICD-10-CM

## 2019-10-08 DIAGNOSIS — C90.00 MULTIPLE MYELOMA NOT HAVING ACHIEVED REMISSION (HCC): ICD-10-CM

## 2019-10-08 LAB
ALBUMIN SERPL BCP-MCNC: 3.3 G/DL (ref 3.5–5)
ALP SERPL-CCNC: 112 U/L (ref 46–116)
ALT SERPL W P-5'-P-CCNC: 18 U/L (ref 12–78)
ANION GAP SERPL CALCULATED.3IONS-SCNC: 6 MMOL/L (ref 4–13)
AST SERPL W P-5'-P-CCNC: 17 U/L (ref 5–45)
BASOPHILS # BLD AUTO: 0.08 THOUSANDS/ΜL (ref 0–0.1)
BASOPHILS NFR BLD AUTO: 1 % (ref 0–1)
BILIRUB SERPL-MCNC: 0.33 MG/DL (ref 0.2–1)
BUN SERPL-MCNC: 41 MG/DL (ref 5–25)
CALCIUM SERPL-MCNC: 8.6 MG/DL (ref 8.3–10.1)
CHLORIDE SERPL-SCNC: 110 MMOL/L (ref 100–108)
CO2 SERPL-SCNC: 24 MMOL/L (ref 21–32)
CREAT SERPL-MCNC: 2.22 MG/DL (ref 0.6–1.3)
EOSINOPHIL # BLD AUTO: 0.38 THOUSAND/ΜL (ref 0–0.61)
EOSINOPHIL NFR BLD AUTO: 5 % (ref 0–6)
ERYTHROCYTE [DISTWIDTH] IN BLOOD BY AUTOMATED COUNT: 17.6 % (ref 11.6–15.1)
GFR SERPL CREATININE-BSD FRML MDRD: 24 ML/MIN/1.73SQ M
GLUCOSE P FAST SERPL-MCNC: 84 MG/DL (ref 65–99)
HCT VFR BLD AUTO: 26.8 % (ref 34.8–46.1)
HGB BLD-MCNC: 8 G/DL (ref 11.5–15.4)
IGA SERPL-MCNC: 86 MG/DL (ref 70–400)
IGG SERPL-MCNC: 1900 MG/DL (ref 700–1600)
IGM SERPL-MCNC: 32 MG/DL (ref 40–230)
IMM GRANULOCYTES # BLD AUTO: 0.09 THOUSAND/UL (ref 0–0.2)
IMM GRANULOCYTES NFR BLD AUTO: 1 % (ref 0–2)
LYMPHOCYTES # BLD AUTO: 1.11 THOUSANDS/ΜL (ref 0.6–4.47)
LYMPHOCYTES NFR BLD AUTO: 14 % (ref 14–44)
MCH RBC QN AUTO: 28.8 PG (ref 26.8–34.3)
MCHC RBC AUTO-ENTMCNC: 29.9 G/DL (ref 31.4–37.4)
MCV RBC AUTO: 96 FL (ref 82–98)
MONOCYTES # BLD AUTO: 0.56 THOUSAND/ΜL (ref 0.17–1.22)
MONOCYTES NFR BLD AUTO: 7 % (ref 4–12)
NEUTROPHILS # BLD AUTO: 5.55 THOUSANDS/ΜL (ref 1.85–7.62)
NEUTS SEG NFR BLD AUTO: 72 % (ref 43–75)
NRBC BLD AUTO-RTO: 0 /100 WBCS
PLATELET # BLD AUTO: 166 THOUSANDS/UL (ref 149–390)
PMV BLD AUTO: 12.7 FL (ref 8.9–12.7)
POTASSIUM SERPL-SCNC: 4.6 MMOL/L (ref 3.5–5.3)
PROT SERPL-MCNC: 7.5 G/DL (ref 6.4–8.2)
RBC # BLD AUTO: 2.78 MILLION/UL (ref 3.81–5.12)
SODIUM SERPL-SCNC: 140 MMOL/L (ref 136–145)
TACROLIMUS BLD-MCNC: 7.9 NG/ML (ref 2–20)
TSH SERPL DL<=0.05 MIU/L-ACNC: 2.44 UIU/ML (ref 0.36–3.74)
WBC # BLD AUTO: 7.77 THOUSAND/UL (ref 4.31–10.16)

## 2019-10-08 PROCEDURE — 87086 URINE CULTURE/COLONY COUNT: CPT | Performed by: INTERNAL MEDICINE

## 2019-10-08 PROCEDURE — 84165 PROTEIN E-PHORESIS SERUM: CPT

## 2019-10-08 PROCEDURE — 36415 COLL VENOUS BLD VENIPUNCTURE: CPT

## 2019-10-08 PROCEDURE — 80053 COMPREHEN METABOLIC PANEL: CPT

## 2019-10-08 PROCEDURE — 80197 ASSAY OF TACROLIMUS: CPT

## 2019-10-08 PROCEDURE — 83883 ASSAY NEPHELOMETRY NOT SPEC: CPT

## 2019-10-08 PROCEDURE — 86334 IMMUNOFIX E-PHORESIS SERUM: CPT

## 2019-10-08 PROCEDURE — 85025 COMPLETE CBC W/AUTO DIFF WBC: CPT

## 2019-10-08 PROCEDURE — 82784 ASSAY IGA/IGD/IGG/IGM EACH: CPT

## 2019-10-08 PROCEDURE — 84165 PROTEIN E-PHORESIS SERUM: CPT | Performed by: PATHOLOGY

## 2019-10-08 PROCEDURE — 84443 ASSAY THYROID STIM HORMONE: CPT

## 2019-10-08 PROCEDURE — 99214 OFFICE O/P EST MOD 30 MIN: CPT | Performed by: PHYSICIAN ASSISTANT

## 2019-10-08 PROCEDURE — 86334 IMMUNOFIX E-PHORESIS SERUM: CPT | Performed by: PATHOLOGY

## 2019-10-08 RX ORDER — LENALIDOMIDE 5 MG/1
5 CAPSULE ORAL
Qty: 11 CAPSULE | Refills: 2 | Status: SHIPPED | OUTPATIENT
Start: 2019-10-08 | End: 2019-10-08 | Stop reason: SDUPTHER

## 2019-10-08 RX ORDER — LENALIDOMIDE 5 MG/1
5 CAPSULE ORAL
Qty: 11 CAPSULE | Refills: 2 | Status: SHIPPED | OUTPATIENT
Start: 2019-10-08 | End: 2019-10-08

## 2019-10-08 RX ORDER — LENALIDOMIDE 5 MG/1
5 CAPSULE ORAL
Qty: 11 CAPSULE | Refills: 2 | Status: SHIPPED | OUTPATIENT
Start: 2019-10-08 | End: 2019-11-04 | Stop reason: SDUPTHER

## 2019-10-08 NOTE — PROGRESS NOTES
Hematology/Oncology Outpatient Follow- up Note  Lolis Mcmillan 53 y o  female MRN: @ Encounter: [id number]        Date:  10/8/2019      Assessment / Plan:    Progressive IgG kappa multiple myeloma diagnosed in 04/2019 from smoldering myeloma diagnosed in 09/2017 the patient has complicated medical history including kidney and pancreatic transplant in 1998 with subsequent pancreatic insufficiency, worsening kidney function, diabetes mellitus type 1 insulin dependent, bone marrow biopsy on 04/2019 showed more progressive myeloma with plasma cells about 35%, monosomy 13, hyperdeploidy  No response to 1 month Ixazomib  She also had severe grade 3 rash secondary to Ixazomib  Lenalidomide 5 mg p o  Daily 3 weeks on 1 week off initiated 8/7/2019 and d/c'd 8/13/2019 when she was admitted to the hospital secondary to acute on chronic renal failure She also had grade 3 rash which responded to Benadryl and Solu-Medrol  This was not as pruritic as that was when she was on Ninlaro  Revlimid 5 milligrams every other day 3 weeks on 1 week off retrialed 8/20/2019  Held during her 9/9 - 9/13/2019 hospitalization  She did not restart  Re-admitted 10/1/2019 2nd to recurrent UTI  IgG 1900 today, 10/8/2019  Compared to 2920 3/2019  Serum free light chains and SPEP result pending %period% creatinine 2 2, hemoglobin stable at 8 5  We again discussed options  Will retrial revlimid at 5 mg po every other day, 3 weeks on 1 week off  If she is unable tolerate or this is ineffective, Daratumumab could be considered  due to her poor venous access, port placement would be warranted        2   Anemia multifactorial due to CKD, CD, MM        HPI: Jefferson Valentin is a 76-year-old  female with history of type 1 diabetes mellitus, diabetic neuropathy, diabetic nephropathy, status post pancreas and kidney transplant in 1998 at [address], amputation of the right big toe, cholecystectomy, vitamin-D deficiency, exacerbation of diabetes mellitus while she is on insulin, possible pancreas burn out, herpes zoster x2, who was found to have abnormal serum protein electrophoresis in August 2017 with IgG kappa a peak of 0 4 grams/deciliter and peak 2  of 2 27 grams/deciliter, mildly elevated kappa light chain, chronic kidney disease     Status post bone marrow biopsy 9/20/2017 showed 15-20% plasma cells, normal cytogenetics and normal FISH panel for myeloma  Bone skeletal survey showed no evidence of lytic lesions  She was diagnosed with smoldering multiple myeloma  She was meet with Dr Faby De La Cruz from [facility] and the decision was to continue to observe     She has diabetic neuropathy grade 3, uses a cane for ambulation, herpes zoster in February 2019  On 04/10/2019 kappa light chain 69, lambda light chain 17 2 with a ratio of 4 0 which increased from ratio of 2 8 in January 2019  Serum protein electrophoresis showed M protein of 0 5 and 2 67 IgG kappa, IgG 2 9 g, IgA 62, IgM 31, creatinine 2 0, total protein 9, albumin 3, calcium 8 6     Repeat bone marrow biopsy on 04/17/2019 showed progressive multiple myeloma with plasma cells in the range of 35%, now with multiple chromosomal abnormalities including 13 Q deletion, 11 Q, trisomy 11, gain of 17 PO trisomy 17, hyperdiploid with cane of additional copies of chromosome 5, 6, 7, 9, 11, 15, 17, 18, large deletion involving most of the chromosome 13 (monosomy 13) 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     Initiated on Ixazomib with severe pruritus requiring discontinuation in June 2019     Lenalidomide 5 mg p o  Daily 3 weeks on 1 week off was approved and initiated 8/7/2019 and d/c'd 8/13/2019  Admitted 8/14- 8/17/2019 2nd to acute on chronic renal failure  She had worsening creatinine with Hematuria and pyuria    Lasix dose was increased 1 week ago from 20 mg to 60 mg daily    8/15-ultrasound completed on transplant kidney, concerning for ATN versus possible rejection  Treated for E coli UTI  She had a Pruritic Maculopapular rash mostly on bilateral thigh and groin area that started 8/13/2019 - improved with benadryl and solumedrol  Met with her nephrologist, Dr Candi Quintero 8/19/2019  Advised to r/s lasix 20mg po daily  She will be away in [place] September 19 through 9/30/2019  She anticipates moving there permanently within the next year to be closer to family  Though approximately 3 5 hours away, she anticipates that most of her medical care will be at [facility] when she moves  Interval History: At her 8/20/2019 f/u treatment options discussed  She elected to Re trial lenalidomide  5 milligrams every other day 3 weeks on 1 week off planned  She did not want IV medication  Admitted 9/9- 9/13/2019 secondary to UTI, EDUARDO/CKD stage IV, Acute metabolic encephalopathy  She was sent to the hospital due to elevated creatinine and dysuria  Revlimid held  Admitted 10/1/2019 2nd to urinary symptoms, dysuria increased frequency, chills and confusion    SPEP and SFLC drawn this am     10/8/2019:  Hemoglobin 8, MCV 96, RDW 17 6, white blood cell count 7 7 with normal differential, platelets 119  Cr 2 22    7/16/2019:  Kappa 90,  Lambda 23,  Ratio 4  Feels better now            Test Results:        Labs:   Lab Results   Component Value Date    HGB 8 0 (L) 10/08/2019    HCT 26 8 (L) 10/08/2019    MCV 96 10/08/2019     10/08/2019    WBC 7 77 10/08/2019    NRBC 0 10/08/2019    ATYLMPCT 2 (H) 08/15/2019     Lab Results   Component Value Date     (L) 01/06/2016    K 4 6 10/08/2019     (H) 10/08/2019    CO2 24 10/08/2019    ANIONGAP 6 01/06/2016    BUN 41 (H) 10/08/2019    CREATININE 2 22 (H) 10/08/2019    GLUCOSE 103 09/13/2017    GLUF 84 10/08/2019    CALCIUM 8 6 10/08/2019    CORRECTEDCA 11 7 (H) 08/28/2018    AST 17 10/08/2019    ALT 18 10/08/2019    ALKPHOS 112 10/08/2019    PROT 8 3 (H) 12/14/2015    BILITOT 0 27 12/14/2015    EGFR 24 10/08/2019       Imaging: Us Transplant Kidney With Doppler    Result Date: 10/1/2019  Narrative: RENAL ULTRASOUND INDICATION: 79-year-old female with past medical history of chronic kidney disease  Urinary incontinence and dysuria  Lower abdominal/suprapubic pain and urinary frequency  Evaluate Left pelvic Transplant kidney (circa 1998)  COMPARISON: September 9, 2019 February 2, 2018 TECHNIQUE:   Ultrasound of the retroperitoneum was performed with a curvilinear transducer utilizing volumetric sweeps and still imaging techniques  FINDINGS: KIDNEYS: Right native kidney Atrophic measuring 5 9 x 2 4 cm  Cortex measures 0 4 cm  Cortical Echogenicity is elevated  No suspicious masses detected  Lower pole simple cyst measuring 1 4 cm, not visible on prior exam but previously seen by CT in February 2018 measuring up to 0 9 cm  No hydronephrosis  No shadowing calculi  No perinephric fluid collections  Left native kidney Left kidney is difficult to distinguish from retroperitoneal fat, and could not be accurately measured  No hydronephrosis or cystic mass identified  Left pelvic transplant kidney Normal size measuring 13 3 x 6 4 cm  Cortex measuring 1 9 cm  Normal cortical echogenicity  Outer contour is slightly lobular  No suspicious masses detected  Upper pole simple cyst measuring 1 8 cm at cortical medullary junction  No hydronephrosis  No shadowing calculi  No perinephric fluid collections  Upper pole arterial resistive indices = 0 6  Interpolar arterial resistive indices = 0 7  Lower pole arterial resistive indices = 0 8  Renal vein is patent  Transplant renal artery demonstrates peak systolic velocities of approximately 160 cm/s without spectral widening  Arterial resistive indices between 0 8 and 0 9  URETERS: Nonvisualized  BLADDER: Incompletely distended  No focal thickening or mass lesions  No ureteral jets were identified       Impression: Arterial resistive indices within the transplant lower pole renal parenchyma and transplant renal artery measuring between 0 8 and 0 9, previously resistive indices measuring up to 0 8 in early September  Transplant renal artery and transplant renal vein are patent  Spectral analysis of the transplant renal artery does not suggest stenosis  Findings are more concerning for developing transplant rejection  No hydronephrosis  Simple renal cysts within both the left pelvic transplant kidney and right native kidney  Left native kidney not reliably identified,  known to be significantly atrophic  The study was marked in [facility] for immediate notification  Workstation performed: [id number]     Us Transplant Kidney With Doppler    Result Date: 9/9/2019  Narrative: RENAL transplant ULTRASOUND INDICATION:   worsening renal function    COMPARISON: August 14, 2019 TECHNIQUE: Ultrasound renal transplant with Dopplers  Ultrasound of the retroperitoneum was performed with a curvilinear transducer utilizing volumetric sweeps and still imaging techniques  FINDINGS: Native kidneys are atrophic  Transplant kidney: Transplant kidney measures 13 3 x 7 2 x 1 5 cm  Cortical echogenicity within normal limits  Previous small cyst is not visible on this exam  No suspicious masses detected  No hydronephrosis  No shadowing calculi  No perinephric fluid collections  Arterial resistive index ranging between 0 72-0 83, previously ranging between 0 4-0 8  Transplant renal vein is patent  Impression: 1  Elevated arterial resistive indices in the transplant kidney up to 0 8 concerning for possible development of transplant rejection or ATN  Nephrology consult recommended  No hydronephrosis or renal vein thrombosis identified  2  Atrophic native kidneys  Workstation performed: [id number]           ROS:  As mentioned in HPI & Interval History otherwise 14 point ROS negative  Allergies:    Allergies   Allergen Reactions    Ixazomib Rash     Ninlaro    Revlimid [Lenalidomide] Rash    Cefadroxil     Latex Rash    Morphine Other (See Comments)     Other reaction(s): Other (See Comments)  projectile vomiting    Morphine And Related GI Intolerance    Myrbetriq [Mirabegron] Hives    Penicillins Hives     Other reaction(s): Other (See Comments)  Respiratory Distress,hives  Tolerates cefazolin     Current Medications: Reviewed  PMH/FH/SH:  Reviewed      Physical Exam:    There is no height or weight on file to calculate BSA  Ht Readings from Last 3 Encounters:   10/01/19 5' 8" (1 727 m)   09/10/19 5' 8" (1 727 m)   08/23/19 5' 8" (1 727 m)        Wt Readings from Last 3 Encounters:   10/04/19 102 kg (225 lb 3 2 oz)   09/10/19 113 kg (249 lb 1 9 oz)   08/23/19 113 kg (250 lb)        Temp Readings from Last 3 Encounters:   10/04/19 97 9 °F (36 6 °C)   09/13/19 97 9 °F (36 6 °C)   08/23/19 98 2 °F (36 8 °C) (Tympanic)        BP Readings from Last 3 Encounters:   10/04/19 164/78   09/13/19 (!) 166/48   08/23/19 156/64           Physical Exam   Constitutional: She is oriented to person, place, and time  She appears well-developed and well-nourished  No distress  HENT:   Head: Normocephalic and atraumatic  Eyes: Conjunctivae are normal    Neck: Normal range of motion  Neck supple  No tracheal deviation present  Cardiovascular: Normal rate and regular rhythm  Exam reveals no gallop and no friction rub  No murmur heard  Pulmonary/Chest: Effort normal and breath sounds normal  No respiratory distress  She has no wheezes  She has no rales  She exhibits no tenderness  Abdominal: Soft  She exhibits no distension  There is no tenderness  Lymphadenopathy:     She has no cervical adenopathy  Neurological: She is alert and oriented to person, place, and time  Skin: Skin is warm and dry  She is not diaphoretic  No erythema  No pallor  Psychiatric: She has a normal mood and affect  Her behavior is normal  Judgment and thought content normal    Vitals reviewed        ECOG: 2      Emergency Contacts:    Jasiel Oswald, [phone],

## 2019-10-08 NOTE — TELEPHONE ENCOUNTER
----- Message from Genie Dukes MD sent at 10/8/2019  3:12 PM EDT -----  Hello    Patient normally is followed up by Ms Lucrecia Jonas  Can you please notify the patient that the renal function is stable at 2 2  Awaiting rest of blood work which will review at the appointment next week      Thank you    np

## 2019-10-08 NOTE — RESULT ENCOUNTER NOTE
Hello    Patient normally is followed up by Ms Chica Luevano  Can you please notify the patient that the renal function is stable at 2 2  Awaiting rest of blood work which will review at the appointment next week      Thank you    np

## 2019-10-09 ENCOUNTER — TELEPHONE (OUTPATIENT)
Dept: HEMATOLOGY ONCOLOGY | Facility: CLINIC | Age: 54
End: 2019-10-09

## 2019-10-09 LAB
BACTERIA UR CULT: NORMAL
KAPPA LC FREE SER-MCNC: 77 MG/L (ref 3.3–19.4)
KAPPA LC FREE/LAMBDA FREE SER: 2.77 {RATIO} (ref 0.26–1.65)
LAMBDA LC FREE SERPL-MCNC: 27.8 MG/L (ref 5.7–26.3)

## 2019-10-09 NOTE — TELEPHONE ENCOUNTER
Stated that the pt called them asking when she should be starting her Revlimid since she was put on hold before  Gricelda beckett that we call her back and let her know

## 2019-10-09 NOTE — TELEPHONE ENCOUNTER
Returned tc spoke with pt  States she is feeling ok and that she has not restarted the revlimid,  She states she has 6 tabs in home and that [facility] called about delivery  Per office visit with Mikal ZAPATA pt can start taking the revlimid take 1 tab every other day  She will start it tomorrow  [facility] to [facility], they tried to fill the revlimid but [facility] had already filled it  If pt wants [facility] to fill then either our office or pt needs to call [facility] and cancel the rx

## 2019-10-10 ENCOUNTER — TELEPHONE (OUTPATIENT)
Dept: HEMATOLOGY ONCOLOGY | Facility: CLINIC | Age: 54
End: 2019-10-10

## 2019-10-10 LAB
ALBUMIN SERPL ELPH-MCNC: 3.42 G/DL (ref 3.5–5)
ALBUMIN SERPL ELPH-MCNC: 45.6 % (ref 52–65)
ALPHA1 GLOB SERPL ELPH-MCNC: 0.38 G/DL (ref 0.1–0.4)
ALPHA1 GLOB SERPL ELPH-MCNC: 5.1 % (ref 2.5–5)
ALPHA2 GLOB SERPL ELPH-MCNC: 0.84 G/DL (ref 0.4–1.2)
ALPHA2 GLOB SERPL ELPH-MCNC: 11.2 % (ref 7–13)
BETA GLOB ABNORMAL SERPL ELPH-MCNC: 0.4 G/DL (ref 0.4–0.8)
BETA1 GLOB SERPL ELPH-MCNC: 5.3 % (ref 5–13)
BETA2 GLOB SERPL ELPH-MCNC: 8 % (ref 2–8)
BETA2+GAMMA GLOB SERPL ELPH-MCNC: 0.6 G/DL (ref 0.2–0.5)
GAMMA GLOB ABNORMAL SERPL ELPH-MCNC: 1.86 G/DL (ref 0.5–1.6)
GAMMA GLOB SERPL ELPH-MCNC: 24.8 % (ref 12–22)
IGG/ALB SER: 0.84 {RATIO} (ref 1.1–1.8)
M PEAK ID 2: 17.4 %
M PROTEIN 1 MFR SERPL ELPH: 5.2 %
M PROTEIN 1 SERPL ELPH-MCNC: 0.39 G/DL
M PROTEIN 2 SERPL ELPH-MCNC: 1.31 G/DL
PROT PATTERN SERPL ELPH-IMP: ABNORMAL
PROT SERPL-MCNC: 7.5 G/DL (ref 6.4–8.2)

## 2019-10-10 NOTE — TELEPHONE ENCOUNTER
Tc to [facility] spoke with pharmacist Opal Fernandes she stated they have already filled the rx and spoke with pt  Tc to pt again she said she is fine with [facility] filling the rx  Tc to [facility] to update

## 2019-10-10 NOTE — TELEPHONE ENCOUNTER
Returned tc to [facility] spoke with Bufford Hatchet, explaining [facility] already filled and pt is ok with getting it from [facility]

## 2019-10-10 NOTE — TELEPHONE ENCOUNTER
Spoke with patient and she stated that she would not like to schedule an appointment with our office   I stated she may call us if she were to ever need a Urologist

## 2019-10-10 NOTE — PROGRESS NOTES
OFFICE FOLLOW UP - Nephrology   Clare Obando 53 y o  female MRN: [id number]       ASSESSMENT and PLAN:  Volodymyr Hall was seen today for follow-up, chronic kidney disease and hypertension  Diagnoses and all orders for this visit:    Acute renal failure superimposed on stage 4 chronic kidney disease, unspecified acute renal failure type ([facility] )- Resolved  -Suspected prerenal from decreased oral intake, possible infection with concerns for recurrent UTI in the setting of recent AK I with peak creatinine of 4 22, versus ongoing improvement in renal function since discharge off Revlimid as previously recommended  Recently admitted    Chronic kidney disease, stage 4 (severe) ([facility])  -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  -after review of the medical records baseline creatinine previously 1 8-2 1 and follows Dr Corinne Hoar  -next appointment is on 10/22/2019 with Dr Corinne Hoar  -will get BMP on 10/21/2019 to ensure stability with recent AK I  -     Basic metabolic panel; Future    Benign hypertension with CKD (chronic kidney disease) stage IV (HCC)  -BP acceptable at today's office visit  -currently on Cardura 4 mg q h s, amlodipine 10 mg mg every a m  And 5 mg every evening, clonidine 0 3mg 3 times daily, hydralazine 50 mg 3 times daily, and Lopressor 50 mg 2 times daily  -will continue to trend    Controlled type 1 diabetes mellitus with diabetic polyneuropathy ([facility])  -need adequate blood sugar control    Secondary hyperparathyroidism, renal (HCC)  -On phosphorus binders with Renvela 800 mg 3 times daily  -will continue to monitor    Transplant/Chronic immunosuppression  - Status post kidney and pancreas transplant  -Prograf increased to 3 mg every 12 hours since recent admission and prednisone 5 mg   -post admission tacrolimus level elevated 7 9 and patient reports obtaining blood work after taking a m   Tacrolimus  -will get repeat tacrolimus level and instructed patient to hold tacrolimus dose in the am prior to blood work obtained-patient reported understanding  -     Tacrolimus level; Future    Anemia chronic disease:  Patient with history of multiple myeloma and Chronic Kidney Disease  -follows Hematology as outpatient  -most recent hemoglobin 8 0  -previously transfused  -patient currently on oral iron     Multiple myeloma:  Biopsy-proven diagnosed in April 2019  -previously on Revlimid-currently on hold since prior hospitalization with concerns of AIN  -follows with Oncology as outpatient      Recurrent urinary tract infection:    -Follow up with urology  -completed course of antibiotics     Health and wellness:  Recommended colonoscopy  Patient reports having one but cant remember when  Encouraged to follow up with GI, buts wants to touch base with family doctor first      Patient Instructions   All questions asked and answered  The patient has been instructed to call office at [phone] with any questions or concerns  The patient has been instructed to obtain prescribed blood work and urine studies prior to next appointment  Avoid NSAID products to include Motrin, Ibuprofen, Aleve, Naprosyn, or naproxen   Repeat Tacrolimus level  Remember to get blood work first in am before taking medication  Get Repeat BMP 10/21/2019  Return to office on 10/22/2019 with Dr Elizabeth Ames as schedule        HPI: Georgette Peng is a 53 y o  female who is here for Follow-up (EDUARDO); Chronic Kidney Disease; and Hypertension  The patient returns to office for hospital follow up for EDUARDO likely secondary to urinary source and discharged on Keflex  Peak creatinine 2 75 and discharge 2 51  Most recent BMP on 10/8/2019 reviewed and reveals creatinine of 2 22 with stable electrolytes and acid base    She has a  PMH of CKD IV baseline creatinine 1 9-2 1, s/p kidney and pancreas transplant in 1998, failed pancreas transplant in 2017, hypertension, diabetes I, history of recurrent UTI and pyelonephritis,  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; diagnosed with bone marrow biopsy in April, 2019, previously hospitalized with severe EDUARDO with peak creatinine 4 4 and improved to 2 8 on discharge  On discussion, she reports feeling much better and that she completed her antibiotic course  She is no longer having urinary symptoms  She denies chest pain, shortness of breath, dizziness, lightheadedness, nausea, vomiting, fevers, chills  She reports eating and drinking better  She reported getting blood work after taking her Tacrolimus, which she does not normally do  ROS:   All the systems were reviewed and were negative except as documented on the HPI  Allergies: Ixazomib; Revlimid [lenalidomide]; Cefadroxil; Latex;  Morphine; Morphine and related; Myrbetriq [mirabegron]; and Penicillins    Medications:   Current Outpatient Medications:     amLODIPine (NORVASC) 10 mg tablet, TAKE 1 TABLET(10MG) BY MOUTH EVERY MORNING AND 1/2 TABLET(5MG) EVERY EVENING (Patient taking differently: Take 10 mg by mouth every morning TAKE 1 TABLET(10MG) BY MOUTH EVERY MORNING AND 1/2 TABLET(5MG) EVERY EVENING), Disp: 135 tablet, Rfl: 0    ARIPiprazole (ABILIFY) 30 mg tablet, Take 1 tablet (30 mg total) by mouth daily at bedtime for 180 days, Disp: 90 tablet, Rfl: 1    aspirin 81 MG tablet, Take 1 tablet by mouth daily, Disp: , Rfl:     busPIRone (BUSPAR) 5 mg tablet, Take 1 tablet (5 mg total) by mouth 2 (two) times a day for 180 days, Disp: 180 tablet, Rfl: 1    Cholecalciferol (VITAMIN D3) 1000 units CAPS, Take 3 capsules by mouth daily  , Disp: , Rfl:     cloNIDine (CATAPRES) 0 1 mg tablet, TAKE 3 TABLETS BY MOUTH EVERY MORNING, 3 TABLETS AT NOON, AND 3 TABLETS EVERY EVENING (Patient taking differently: 0 3 mg every 8 (eight) hours TAKE 3 TABLETS BY MOUTH EVERY MORNING, 3 TABLETS AT NOON, AND 3 TABLETS EVERY EVENING), Disp: 810 tablet, Rfl: 4    doxazosin (CARDURA) 4 mg tablet, Take 1 tablet (4 mg total) by mouth daily at bedtime, Disp: 30 tablet, Rfl: 0    DULoxetine (CYMBALTA) 60 mg delayed release capsule, Take 1 capsule (60 mg total) by mouth 2 (two) times a day for 180 days, Disp: 180 capsule, Rfl: 1    ferrous sulfate 325 (65 Fe) mg tablet, Take 1 tablet (325 mg total) by mouth daily with breakfast, Disp: 30 tablet, Rfl: 0    folic acid (FOLVITE) 1 mg tablet, TAKE 1 TABLET BY MOUTH DAILY AS DIRECTED, Disp: 90 tablet, Rfl: 3    hydrALAZINE (APRESOLINE) 50 mg tablet, TAKE 1 TABLET(50MG) BY MOUTH THREE TIMES DAILY FOR 90 DAYS, Disp: 270 tablet, Rfl: 0    Insulin Glargine (TOUJEO SOLOSTAR) 300 units/mL CONCETRATED U-300 injection pen, Inject 1 Units under the skin daily at bedtime, Disp: 4 pen, Rfl: 0    insulin lispro (HUMALOG KWIKPEN) 100 units/mL injection pen, INJECT 10 UNDER THE SKIN EVERY DAY OR AS DIRECTED (Patient taking differently: INJECT 10 UNDER THE SKIN EVERY DAY OR AS DIRECTED), Disp: 45 mL, Rfl: 1    Insulin Pen Needle (BD PEN NEEDLE VISHNU U/F) 32G X 4 MM MISC, Use 4 times daily, Disp: 400 each, Rfl: 1    Lancets (ONETOUCH ULTRASOFT) lancets, by Does not apply route 4 (four) times a day  , Disp: , Rfl:     lenalidomide (REVLIMID) 5 MG CAPS, Take 1 capsule (5 mg total) by mouth every other day 3 weeks on/ 1 week off   [facility] # [id number] 10/8/19, Disp: 11 capsule, Rfl: 2    levothyroxine 125 mcg tablet, Take 1 tablet (125 mcg total) by mouth daily, Disp: 90 tablet, Rfl: 2    LORazepam (ATIVAN) 2 mg tablet, Take 1 tablet (2 mg total) by mouth 3 (three) times a day as needed for anxiety for up to 120 days To be filled on or after 6/29/19, Disp: 90 tablet, Rfl: 3    metoprolol tartrate (LOPRESSOR) 50 mg tablet, TAKE 1 TABLET(50 MG TOTAL) BY MOUTH TWICE DAILY, Disp: 180 tablet, Rfl: 0    ONE TOUCH ULTRA TEST test strip, Use 4 times daily ( please dispense One touch Ultra test strips), Disp: 400 each, Rfl: 1    pravastatin (PRAVACHOL) 80 mg tablet, TAKE 1 TABLET BY MOUTH DAILY, Disp: 90 tablet, Rfl: 5    predniSONE 5 mg tablet, Take 1 tablet (5 mg total) by mouth daily, Disp: 90 tablet, Rfl: 3    rOPINIRole (REQUIP) 0 25 mg tablet, TAKE 1 TABLET BY MOUTH TWICE DAILY, Disp: 180 tablet, Rfl: 0    sertraline (ZOLOFT) 100 mg tablet, Take 2 tablets (200 mg total) by mouth daily for 180 days, Disp: 180 tablet, Rfl: 1    sevelamer carbonate (RENVELA) 800 mg tablet, Take 1 tablet (800 mg total) by mouth 3 (three) times a day with meals, Disp: 270 tablet, Rfl: 3    sodium bicarbonate 650 mg tablet, TAKE 2 TABLETS BY MOUTH THREE TIMES DAILY, Disp: 180 tablet, Rfl: 0    amLODIPine (NORVASC) 5 mg tablet, Take 5 mg by mouth every evening, Disp: , Rfl:     polyethylene glycol (MIRALAX) 17 g packet, Take 17 g by mouth daily (Patient not taking: Reported on 10/11/2019), Disp: 14 each, Rfl: 0    tacrolimus (PROGRAF) 1 mg capsule, Take 3 mg in AM and 2 mg in PM (6/18/19) (Patient taking differently: Take 3 mg by mouth every 12 (twelve) hours ), Disp: 180 capsule, Rfl: 6    tacrolimus (PROGRAF) 1 mg capsule, Take 2 mg by mouth every evening, Disp: , Rfl:     Past Medical History:   Diagnosis Date    Abnormal liver function test     Acute kidney injury ([facility] )     Acute on chronic congestive heart failure (HCC)     Allergic urticaria     Anemia     Cancer (HCC)     Multiple myeloma    Cervical dysplasia     Cholelithiasis     Chronic diastolic (congestive) heart failure (HCC) 9/18/2017    Diabetes mellitus ([facility] )     Previous, controlled with diet    Diabetes mellitus with foot ulcer (HCC)     Disease of thyroid gland     Encephalopathy     Hematuria     + leak est- secondary to UTIs/panc drainage    History of transfusion     Hyperkalemia     Hypertension     Iliotibial band syndrome     Lumbar radiculopathy     Multiple myeloma (HCC)     Multiple myeloma (HCC)     Night blindness     Nonrheumatic aortic (valve) insufficiency     Pneumonia     Renal disorder     Retinopathy     Seborrhea     Seizure (HCC)     Shingles     Sinus tachycardia     B blocker - cardio echo stress test 02 normal/neg LE doppler 2/02 OK and 12/07    Status post simultaneous kidney and pancreas transplant ([address] )     Toe amputation status     Trochanteric bursitis      Past Surgical History:   Procedure Laterality Date    CATARACT EXTRACTION      CHOLECYSTECTOMY      COLONOSCOPY      two polyps in the rectum removed and biopsied diverticulosis in the sigmoid colon, external hemorrhoiods- Dr Barfield    COMBINED KIDNEY-PANCREAS TRANSPLANT N/A     CT BONE MARROW BIOPSY AND ASPIRATION  4/17/2019    CYSTOSCOPY N/A 10/13/2016    Procedure: CYSTOSCOPY, retrograde pyelogram, biopsy of ureteral polyp; Surgeon: Lars Page MD;  Location: [facility];  Service:    [facility] DILATION AND CURETTAGE OF UTERUS      ESOPHAGOGASTRODUODENOSCOPY N/A 11/20/2017    Procedure: ESOPHAGOGASTRODUODENOSCOPY (EGD); Surgeon: Lauren Silva DO;  Location: [facility]; Service: Gastroenterology    EYE SURGERY      cataracts    FOOT AMPUTATION THROUGH METATARSAL Left     FOOT SURGERY Right     excision of metatarsal heads    HALLUX VALGUS CORRECTION Right     NEPHRECTOMY TRANSPLANTED ORGAN      PANCREATIC TRANSPLANT REMOVAL  1998     Family History   Problem Relation Age of Onset    Hypertension Mother     Cancer Mother     Hypertension Father     Cancer Father     Cancer Maternal Grandfather     Cancer Paternal Grandmother     Cancer Paternal Grandfather     Depression Sister     Breast cancer Maternal Grandmother 77      reports that she quit smoking about 7 years ago  She has never used smokeless tobacco  She reports that she does not drink alcohol or use drugs  Physical Exam:   Vitals:    10/11/19 0852   BP: 120/50   BP Location: Left arm   Patient Position: Sitting   Cuff Size: Large   Pulse: 57   Resp: 16   Weight: 104 kg (228 lb 6 oz)   Height: 5' 8" (1 727 m)     Body mass index is 34 72 kg/m²      General: cooperative, in not acute distress  Eyes: conjunctivae pink, anicteric sclerae  ENT: lips and mucous membranes moist  Neck: supple, no JVD  Chest: clear breath sounds bilateral, no crackles, ronchus or wheezings  CVS: distinct S1 & S2, normal rate, regular rhythm  Abdomen: soft, non-tender, non-distended, normoactive bowel sounds  Back: no CVA tenderness  Extremities: No significant edema of both legs  Skin: no rash, warm and dry  Neuro: awake, alert, oriented      Lab Results:  Results for orders placed or performed in visit on 10/08/19   IgG, IgA, IgM   Result Value Ref Range    IGA 86 0 70 0 - 400 0 mg/dL    IGG 1,900 0 (H) 700 0-1,600 0 mg/dL    IGM 32 0 (L) 40 0 - 230 0 mg/dL   Tacrolimus level   Result Value Ref Range    TACROLIMUS 7 9 2 0 - 20 0 ng/mL   TSH, 3rd generation with Free T4 reflex   Result Value Ref Range    TSH 3RD GENERATON 2 440 0 358 - 3 740 uIU/mL   Protein electrophoresis, serum   Result Value Ref Range    A/G Ratio 0 84 (L) 1 10 - 1 80    Albumin Electrophoresis 45 6 (L) 52 0 - 65 0 %    Albumin CONC 3 42 (L) 3 50 - 5 00 g/dl    Alpha 1 5 1 (H) 2 5 - 5 0 %    ALPHA 1 CONC 0 38 0 10 - 0 40 g/dL    Alpha 2 11 2 7 0 - 13 0 %    ALPHA 2 CONC 0 84 0 40 - 1 20 g/dL    Beta-1 5 3 5 0 - 13 0 %    BETA 1 CONC 0 40 0 40 - 0 80 g/dL    Beta-2 8 0 2 0 - 8 0 %    BETA 2 CONC 0 60 (H) 0 20 - 0 50 g/dL    Gamma Globulin 24 8 (H) 12 0 - 22 0 %    GAMMA CONC 1 86 (H) 0 50 - 1 60 g/dL    M Peak ID 1 5 20 %    M PEAK 1 CONC 0 39 g/dL    M Peak ID 2 17 40 %    M Peak 2 CONC 1 31 g/dL    Total Protein 7 5 6 4 - 8 2 g/dL    SPEP Interpretation       The SPEP shows a clonal gammopathy  Previous immunofixation 10/10/18 identified a biclonal gammopathy as IgG kappa  Repeat immunofixation to be performed  Reviewed by: Carey Day MD ([id number]) **Electronic Signature**   Immunoglobulin free LT chains blood   Result Value Ref Range    Ig Kappa Free Light Chain 77 0 (H) 3 3 - 19 4 mg/L    Ig Lambda Free Light Chain 27 8 (H) 5 7 - 26 3 mg/L    Kappa/Lambda FluidC Ratio 2 77 (H) 0 26 - 1 65   CBC and differential   Result Value Ref Range    WBC 7 77 4 31 - 10 16 Thousand/uL    RBC 2 78 (L) 3 81 - 5 12 Million/uL    Hemoglobin 8 0 (L) 11 5 - 15 4 g/dL    Hematocrit 26 8 (L) 34 8 - 46 1 %    MCV 96 82 - 98 fL    MCH 28 8 26 8 - 34 3 pg    MCHC 29 9 (L) 31 4 - 37 4 g/dL    RDW 17 6 (H) 11 6 - 15 1 %    MPV 12 7 8 9 - 12 7 fL    Platelets 253 219 - 398 Thousands/uL    nRBC 0 /100 WBCs    Neutrophils Relative 72 43 - 75 %    Immat GRANS % 1 0 - 2 %    Lymphocytes Relative 14 14 - 44 %    Monocytes Relative 7 4 - 12 %    Eosinophils Relative 5 0 - 6 %    Basophils Relative 1 0 - 1 %    Neutrophils Absolute 5 55 1 85 - 7 62 Thousands/µL    Immature Grans Absolute 0 09 0 00 - 0 20 Thousand/uL    Lymphocytes Absolute 1 11 0 60 - 4 47 Thousands/µL    Monocytes Absolute 0 56 0 17 - 1 22 Thousand/µL    Eosinophils Absolute 0 38 0 00 - 0 61 Thousand/µL    Basophils Absolute 0 08 0 00 - 0 10 Thousands/µL   Comprehensive metabolic panel   Result Value Ref Range    Sodium 140 136 - 145 mmol/L    Potassium 4 6 3 5 - 5 3 mmol/L    Chloride 110 (H) 100 - 108 mmol/L    CO2 24 21 - 32 mmol/L    ANION GAP 6 4 - 13 mmol/L    BUN 41 (H) 5 - 25 mg/dL    Creatinine 2 22 (H) 0 60 - 1 30 mg/dL    Glucose, Fasting 84 65 - 99 mg/dL    Calcium 8 6 8 3 - 10 1 mg/dL    AST 17 5 - 45 U/L    ALT 18 12 - 78 U/L    Alkaline Phosphatase 112 46 - 116 U/L    Total Protein 7 5 6 4 - 8 2 g/dL    Albumin 3 3 (L) 3 5 - 5 0 g/dL    Total Bilirubin 0 33 0 20 - 1 00 mg/dL    eGFR 24 ml/min/1 73sq m   Immunofixation, Serum(Reflex Only-Do Not Order)   Result Value Ref Range    Immunofixation Interpretation       Serum IF shows uncertain findings  Specimen sent to [facility] for further study  Reviewed by Patricia Day MD ([id number])  **Electronic Signature**     *Note: Due to a large number of results and/or encounters for the requested time period, some results have not been displayed  A complete set of results can be found in Results Review         Results from last 7 days   Lab Units 10/08/19  0812   WBC Thousand/uL 7 77   HEMOGLOBIN g/dL 8 0*   HEMATOCRIT % 26 8*   PLATELETS Thousands/uL 166   SODIUM mmol/L 140   POTASSIUM mmol/L 4 6   CHLORIDE mmol/L 110*   CO2 mmol/L 24   BUN mg/dL 41*   CREATININE mg/dL 2 22*   CALCIUM mg/dL 8 6           Portions of the record may have been created with voice recognition software  Occasional wrong word or "sound a like" substitutions may have occurred due to the inherent limitations of voice recognition software  Read the chart carefully and recognize, using context, where substitutions have occurred  If you have any questions, please contact the dictating provider

## 2019-10-10 NOTE — TELEPHONE ENCOUNTER
[employer] [anita called stating that they were suppose to receive a call back about the patient medication being sent to the wrong pharmacy  She stated that they do not know what to do with the refill request that was received  Please review and contact [facility], [phone]

## 2019-10-11 ENCOUNTER — OFFICE VISIT (OUTPATIENT)
Dept: NEPHROLOGY | Facility: CLINIC | Age: 54
End: 2019-10-11
Payer: MEDICARE

## 2019-10-11 VITALS
HEART RATE: 57 BPM | WEIGHT: 228.38 LBS | BODY MASS INDEX: 34.61 KG/M2 | RESPIRATION RATE: 16 BRPM | DIASTOLIC BLOOD PRESSURE: 50 MMHG | HEIGHT: 68 IN | SYSTOLIC BLOOD PRESSURE: 120 MMHG

## 2019-10-11 DIAGNOSIS — I12.9 BENIGN HYPERTENSION WITH CKD (CHRONIC KIDNEY DISEASE) STAGE IV (HCC): ICD-10-CM

## 2019-10-11 DIAGNOSIS — N18.4 BENIGN HYPERTENSION WITH CKD (CHRONIC KIDNEY DISEASE) STAGE IV (HCC): ICD-10-CM

## 2019-10-11 DIAGNOSIS — N18.4 CHRONIC KIDNEY DISEASE, STAGE 4 (SEVERE) (HCC): ICD-10-CM

## 2019-10-11 DIAGNOSIS — C90.00 MULTIPLE MYELOMA NOT HAVING ACHIEVED REMISSION (HCC): ICD-10-CM

## 2019-10-11 DIAGNOSIS — N25.81 SECONDARY HYPERPARATHYROIDISM, RENAL (HCC): ICD-10-CM

## 2019-10-11 DIAGNOSIS — D63.1 ANEMIA DUE TO STAGE 3 CHRONIC KIDNEY DISEASE (HCC): ICD-10-CM

## 2019-10-11 DIAGNOSIS — Z94.9 TRANSPLANT: ICD-10-CM

## 2019-10-11 DIAGNOSIS — N18.4 ACUTE RENAL FAILURE SUPERIMPOSED ON STAGE 4 CHRONIC KIDNEY DISEASE, UNSPECIFIED ACUTE RENAL FAILURE TYPE (HCC): Primary | ICD-10-CM

## 2019-10-11 DIAGNOSIS — N18.30 ANEMIA DUE TO STAGE 3 CHRONIC KIDNEY DISEASE (HCC): ICD-10-CM

## 2019-10-11 DIAGNOSIS — N17.9 ACUTE RENAL FAILURE SUPERIMPOSED ON STAGE 4 CHRONIC KIDNEY DISEASE, UNSPECIFIED ACUTE RENAL FAILURE TYPE (HCC): Primary | ICD-10-CM

## 2019-10-11 DIAGNOSIS — E10.42 CONTROLLED TYPE 1 DIABETES MELLITUS WITH DIABETIC POLYNEUROPATHY (HCC): ICD-10-CM

## 2019-10-11 LAB — INTERPRETATION UR IFE-IMP: NORMAL

## 2019-10-11 PROCEDURE — 99213 OFFICE O/P EST LOW 20 MIN: CPT | Performed by: NURSE PRACTITIONER

## 2019-10-15 ENCOUNTER — OFFICE VISIT (OUTPATIENT)
Dept: FAMILY MEDICINE CLINIC | Facility: CLINIC | Age: 54
End: 2019-10-15
Payer: MEDICARE

## 2019-10-15 VITALS
SYSTOLIC BLOOD PRESSURE: 150 MMHG | OXYGEN SATURATION: 98 % | TEMPERATURE: 97.9 F | RESPIRATION RATE: 16 BRPM | BODY MASS INDEX: 34.22 KG/M2 | DIASTOLIC BLOOD PRESSURE: 62 MMHG | HEART RATE: 60 BPM | HEIGHT: 68 IN | WEIGHT: 225.8 LBS

## 2019-10-15 DIAGNOSIS — E10.42 CONTROLLED TYPE 1 DIABETES MELLITUS WITH DIABETIC POLYNEUROPATHY (HCC): ICD-10-CM

## 2019-10-15 DIAGNOSIS — Z94.0 STATUS POST KIDNEY TRANSPLANT: ICD-10-CM

## 2019-10-15 DIAGNOSIS — N17.9 ACUTE RENAL FAILURE SUPERIMPOSED ON STAGE 4 CHRONIC KIDNEY DISEASE, UNSPECIFIED ACUTE RENAL FAILURE TYPE (HCC): ICD-10-CM

## 2019-10-15 DIAGNOSIS — E87.2 METABOLIC ACIDOSIS: ICD-10-CM

## 2019-10-15 DIAGNOSIS — R53.1 WEAKNESS: ICD-10-CM

## 2019-10-15 DIAGNOSIS — N18.4 ACUTE RENAL FAILURE SUPERIMPOSED ON STAGE 4 CHRONIC KIDNEY DISEASE, UNSPECIFIED ACUTE RENAL FAILURE TYPE (HCC): ICD-10-CM

## 2019-10-15 DIAGNOSIS — N39.0 URINARY TRACT INFECTION WITHOUT HEMATURIA, SITE UNSPECIFIED: Primary | ICD-10-CM

## 2019-10-15 DIAGNOSIS — N18.4 BENIGN HYPERTENSION WITH CKD (CHRONIC KIDNEY DISEASE) STAGE IV (HCC): ICD-10-CM

## 2019-10-15 DIAGNOSIS — I12.9 BENIGN HYPERTENSION WITH CKD (CHRONIC KIDNEY DISEASE) STAGE IV (HCC): ICD-10-CM

## 2019-10-15 DIAGNOSIS — E03.9 ACQUIRED HYPOTHYROIDISM: ICD-10-CM

## 2019-10-15 DIAGNOSIS — C90.00 MULTIPLE MYELOMA NOT HAVING ACHIEVED REMISSION (HCC): ICD-10-CM

## 2019-10-15 DIAGNOSIS — R26.2 AMBULATORY DYSFUNCTION: ICD-10-CM

## 2019-10-15 DIAGNOSIS — D63.1 ANEMIA DUE TO STAGE 3 CHRONIC KIDNEY DISEASE (HCC): ICD-10-CM

## 2019-10-15 DIAGNOSIS — N18.30 ANEMIA DUE TO STAGE 3 CHRONIC KIDNEY DISEASE (HCC): ICD-10-CM

## 2019-10-15 PROCEDURE — 99495 TRANSJ CARE MGMT MOD F2F 14D: CPT | Performed by: FAMILY MEDICINE

## 2019-10-15 PROCEDURE — 1111F DSCHRG MED/CURRENT MED MERGE: CPT | Performed by: FAMILY MEDICINE

## 2019-10-15 NOTE — PROGRESS NOTES
Chief Complaint   Patient presents with    Transition of Care Management     10/1-10/4/19 UTI (urinary tract infection)     Health Maintenance   Topic Date Due    Medicare Annual Wellness Visit (AWV)  1965    HEPATITIS B VACCINES (1 of 3 - Risk 3-dose series) 03/08/1983    DTaP,Tdap,and Td Vaccines (1 - Tdap) 03/08/1985    Cervical Cancer Screening  03/08/1985    Pneumococcal Vaccine: Pediatrics (0 to 5 Years) and At-Risk Patients (6 to 59 Years) (3 of 3 - PCV13) 10/06/2018    DXA SCAN  10/17/2019    HEMOGLOBIN A1C  03/09/2020    BMI: Followup Plan  05/29/2020    Diabetic Foot Exam  07/01/2020    DM Eye Exam  08/09/2020    URINE MICROALBUMIN  09/09/2020    BMI: Adult  10/11/2020    MAMMOGRAM  08/12/2021    CRC Screening: Colonoscopy  08/07/2023    Pneumococcal Vaccine: 65+ Years (1 of 2 - PCV13) 03/08/2029    Hepatitis C Screening  Completed    INFLUENZA VACCINE  Completed     Assessment/Plan:     Patient presents for TCM visit  She was hospitalized at [address] [place] [address] 10/1/19 - 21/4/60  for metabolic acidosis, acute renal failure superimposed on stage 4 chronic kidney disease, acute metabolic encephalopathy associated with UTI, multiple myeloma, anemia  Patient completed antibiotic therapy with Keflex  She refusing to follow up with urology, does not want to proceed with cystoscopy due to recurrent UTI's  Recommended to stay well hydrated  Continue current medical regimen  Follow up with nephrology Dr Donny Unger on 10/22/19  Follow -up with endocrinology this month  Follow- up with cardiology Dr Jaki Ortez in November 2019  Multiple myeloma- patient reports vomiting after restarting on Revlimid  Recommended to discuss with hematology- oncology Dr Wilfrid Teresa chemotherapy  Patient was discharged home with instructions to start outpatient PT /OT therapy  Patient feeling weak, tired  She ambulates with a cane      She states that will not be able to drive herself for outpatient PT  Will place order for VNA services to arrange home PT/OT  Keep follow-up office visit as scheduled in 12/19  No problem-specific Assessment & Plan notes found for this encounter  Diagnoses and all orders for this visit:    Urinary tract infection without hematuria, site unspecified    Metabolic acidosis    Acute renal failure superimposed on stage 4 chronic kidney disease, unspecified acute renal failure type ([facility] )    Multiple myeloma not having achieved remission ([facility] )    Status post kidney transplant    Anemia due to stage 3 chronic kidney disease (HCC)    Benign hypertension with CKD (chronic kidney disease) stage IV (HCC)    Acquired hypothyroidism    Controlled type 1 diabetes mellitus with diabetic polyneuropathy ([facility] )    Weakness  -     Ambulatory Referral to Home Health; Future    Ambulatory dysfunction  -     Ambulatory Referral to Home Health; Future         Subjective:     Patient ID: Prateek Valentine is a 53 y o  female  HPI     Patient presents for TCM visit  She was hospitalized at [address] [place] [address] 10/1/19 - 08/0/78  for metabolic acidosis, acute renal failure superimposed on stage 4 chronic kidney disease, acute metabolic encephalopathy associated with UTI, multiple myeloma, anemia  Reviewed hospital records, test results, discharge medications with patient  Patient completed antibiotic therapy with Keflex as prescribed  Denies burning with urination, urinary urgency  No fever or chills  Patient c/o feeling very tired, week  She ambulates with a cane  Patient was recommended to start outpatient PT, OT  Initial PT evaluation is scheduled for next Wednesday, 10/23/19  Patient states that she has difficulty driving due to her current medical conditions, asking to arrange home PT/OT services  Multiple myeloma - management per hematology- oncology Dr Lizette Calderon    Patient repopts vomiting after restaring on Revlimid this week     Patient is S/P kidney and pancreas transplant in 1998  She was seen by nephrology [facility] on 10/11/19 and will follow-up with Dr Corinne Hoar on 10/22/19  Patient has order to check BMP  HTN - BP at home ranges 150's /60  Patient denies CP, SOB, dizziness  Hypothyroidism- on Levothyroxine 125 mcg daily  Hyperlipidemia - currently takes Pravastatin 80 mg daily  Review of Systems   Constitutional: Positive for fatigue  Negative for activity change, appetite change, chills and fever  Feels weak   HENT: Negative for congestion, ear pain, hearing loss, mouth sores, nosebleeds, sore throat, tinnitus and trouble swallowing  Eyes: Negative for pain, discharge, redness, itching and visual disturbance  Respiratory: Negative for cough, chest tightness, shortness of breath and wheezing  Cardiovascular: Negative for chest pain, palpitations and leg swelling  Gastrointestinal: Positive for constipation (occasional)  Negative for abdominal pain, diarrhea, nausea and vomiting  Genitourinary: Negative for difficulty urinating, dysuria, flank pain, frequency and hematuria  Musculoskeletal: Positive for back pain (improved ) and gait problem (ambulates with a cane)  Negative for joint swelling and neck pain  Skin: Negative for rash and wound  Neurological: Positive for numbness (in feet)  Negative for dizziness, syncope and headaches  Hematological: Negative for adenopathy  Bruises/bleeds easily  Psychiatric/Behavioral: Positive for sleep disturbance  Negative for suicidal ideas  Depression /Anxiety - feels stressed out due to frequent hospitalizations         Objective:     Physical Exam   Constitutional: She appears well-developed and well-nourished  HENT:   Head: Normocephalic and atraumatic  Right Ear: External ear normal    Left Ear: External ear normal    Mouth/Throat: Oropharynx is clear and moist    Eyes: Pupils are equal, round, and reactive to light   Conjunctivae are normal    Neck: Normal range of motion  Neck supple  No JVD present  Cardiovascular: Normal rate and regular rhythm  Murmur heard  No BL LE edema   Pulmonary/Chest: Effort normal and breath sounds normal  She has no wheezes  She has no rales  Abdominal: Soft  Bowel sounds are normal  There is no tenderness  Musculoskeletal:   Patient ambulates with a cane  No joints swelling or tenderness   Skin: Skin is warm and dry  No pallor  Psychiatric: She has a normal mood and affect  Her behavior is normal    Nursing note and vitals reviewed  There were no vitals filed for this visit  Transitional Care Management Review: Robert Stevenson is a 53 y o  female here for TCM follow up  During the TCM phone call patient stated:    TCM Call (since 9/14/2019)     Date and time call was made  10/4/2019  4:22 PM    Hospital care reviewed  Records reviewed    Patient was hospitialized at  [facility]    Date of Admission  10/01/19    Date of discharge  10/04/19    Diagnosis  UTI (urinary tract infection)    Disposition  Home    Were the patients medications reviewed and updated  Yes    Current Symptoms  None      TCM Call (since 9/14/2019)     Post hospital issues  None    Should patient be enrolled in anticoag monitoring? No    Scheduled for follow up?   Yes <img src='C:FILES (X86)    Did you obtain your prescribed medications  Yes    Do you need help managing your prescriptions or medications  No    Is transportation to your appointment needed  No    I have advised the patient to call PCP with any new or worsening symptoms  Erickson Pillai, [address] [place] [address]  None    Are you recieving any outpatient services  No    Are you recieving home care services  No    Are you using any community resources  No    Current waiver services  No    Have you fallen in the last 12 months  No    Interperter language line needed  No    Counseling  Patient          Erickson Pillai, [place]

## 2019-10-16 ENCOUNTER — TELEPHONE (OUTPATIENT)
Dept: FAMILY MEDICINE CLINIC | Facility: CLINIC | Age: 54
End: 2019-10-16

## 2019-10-16 DIAGNOSIS — N18.4 CKD (CHRONIC KIDNEY DISEASE), STAGE IV (HCC): ICD-10-CM

## 2019-10-16 DIAGNOSIS — I10 ESSENTIAL HYPERTENSION: ICD-10-CM

## 2019-10-16 NOTE — TELEPHONE ENCOUNTER
I called and left a message for patient, letting her know that the order was placed in her chart so they should be reaching out to her to set things up

## 2019-10-16 NOTE — TELEPHONE ENCOUNTER
Patient called to say she saw Dr Jailene Connelly and wanted to know if Dr Jailene Connelly contacted a home health therapist or does she need to do so   Peggy Angelucci can be reached at [phone]

## 2019-10-18 RX ORDER — HYDRALAZINE HYDROCHLORIDE 50 MG/1
TABLET, FILM COATED ORAL
Qty: 270 TABLET | Refills: 0 | Status: SHIPPED | OUTPATIENT
Start: 2019-10-18 | End: 2019-12-06 | Stop reason: HOSPADM

## 2019-10-18 RX ORDER — FUROSEMIDE 20 MG/1
TABLET ORAL
Qty: 90 TABLET | Refills: 0 | OUTPATIENT
Start: 2019-10-18

## 2019-10-20 ENCOUNTER — TELEPHONE (OUTPATIENT)
Dept: OTHER | Facility: OTHER | Age: 54
End: 2019-10-20

## 2019-10-20 NOTE — TELEPHONE ENCOUNTER
Paged Yamil Berg via Fritz Bailey @ 1467 RE patient having diarrhea for 2 hours and vomiting medication

## 2019-10-21 ENCOUNTER — TELEPHONE (OUTPATIENT)
Dept: HEMATOLOGY ONCOLOGY | Facility: CLINIC | Age: 54
End: 2019-10-21

## 2019-10-21 DIAGNOSIS — I12.9 BENIGN HYPERTENSION WITH CKD (CHRONIC KIDNEY DISEASE) STAGE IV (HCC): Primary | ICD-10-CM

## 2019-10-21 DIAGNOSIS — N18.4 BENIGN HYPERTENSION WITH CKD (CHRONIC KIDNEY DISEASE) STAGE IV (HCC): Primary | ICD-10-CM

## 2019-10-21 DIAGNOSIS — R11.2 CHEMOTHERAPY INDUCED NAUSEA AND VOMITING: Primary | ICD-10-CM

## 2019-10-21 DIAGNOSIS — T45.1X5A CHEMOTHERAPY INDUCED NAUSEA AND VOMITING: Primary | ICD-10-CM

## 2019-10-21 RX ORDER — ONDANSETRON 8 MG/1
8 TABLET, ORALLY DISINTEGRATING ORAL EVERY 8 HOURS PRN
Qty: 30 TABLET | Refills: 1 | Status: SHIPPED | OUTPATIENT
Start: 2019-10-21 | End: 2019-11-05

## 2019-10-21 NOTE — TELEPHONE ENCOUNTER
Caridad called and said patient is requesting a refill on her Furosemide 20mg qd, but I didn't see it on her medication list  Should the patient be taking this medication? If so please refill prescription

## 2019-10-21 NOTE — TELEPHONE ENCOUNTER
Veena Nobles called stating the Revlimid she is taking is making her sick  States she took it Friday 10/18/19 and it made her sick  (vomiting and diarrhea) She took it again yesterday and had the same reaction  She wants to know if there is something she can take to ease the nausea and upset stomach  Best call back # [phone]

## 2019-10-21 NOTE — TELEPHONE ENCOUNTER
Reviewed with Dr Samuel Hernandez  He would like the patient to take zofran 30 min prior to the revlimid  The patient should call and update us within 2 days to see if this is effective  The patient can add imodium OTC if the does not constipate her  Called the patient and reviewed this with her  Patient verbalized understanding  Pended the script to Dr Samuel Hernandez to sign

## 2019-10-22 NOTE — TELEPHONE ENCOUNTER
Thank you    I called pt  No answer   Asked pt to call us back to let me know when it is a good time to talk    np

## 2019-10-23 ENCOUNTER — TELEPHONE (OUTPATIENT)
Dept: FAMILY MEDICINE CLINIC | Facility: CLINIC | Age: 54
End: 2019-10-23

## 2019-10-23 ENCOUNTER — TREATMENT (OUTPATIENT)
Dept: NEPHROLOGY | Facility: CLINIC | Age: 54
End: 2019-10-23

## 2019-10-23 ENCOUNTER — OFFICE VISIT (OUTPATIENT)
Dept: URGENT CARE | Age: 54
End: 2019-10-23
Payer: MEDICARE

## 2019-10-23 VITALS
DIASTOLIC BLOOD PRESSURE: 62 MMHG | WEIGHT: 224 LBS | OXYGEN SATURATION: 96 % | BODY MASS INDEX: 33.95 KG/M2 | RESPIRATION RATE: 18 BRPM | HEART RATE: 56 BPM | SYSTOLIC BLOOD PRESSURE: 154 MMHG | TEMPERATURE: 97 F | HEIGHT: 68 IN

## 2019-10-23 DIAGNOSIS — R31.9 URINARY TRACT INFECTION WITH HEMATURIA, SITE UNSPECIFIED: Primary | ICD-10-CM

## 2019-10-23 DIAGNOSIS — N39.0 RECURRENT UTI: Primary | ICD-10-CM

## 2019-10-23 DIAGNOSIS — N39.0 URINARY TRACT INFECTION WITH HEMATURIA, SITE UNSPECIFIED: Primary | ICD-10-CM

## 2019-10-23 LAB
SL AMB  POCT GLUCOSE, UA: NEGATIVE
SL AMB LEUKOCYTE ESTERASE,UA: ABNORMAL
SL AMB POCT BILIRUBIN,UA: ABNORMAL
SL AMB POCT BLOOD,UA: ABNORMAL
SL AMB POCT CLARITY,UA: ABNORMAL
SL AMB POCT COLOR,UA: YELLOW
SL AMB POCT KETONES,UA: NEGATIVE
SL AMB POCT NITRITE,UA: POSITIVE
SL AMB POCT PH,UA: 8
SL AMB POCT SPECIFIC GRAVITY,UA: 1
SL AMB POCT URINE PROTEIN: 30
SL AMB POCT UROBILINOGEN: 0.2

## 2019-10-23 PROCEDURE — 87086 URINE CULTURE/COLONY COUNT: CPT | Performed by: PHYSICIAN ASSISTANT

## 2019-10-23 PROCEDURE — G0463 HOSPITAL OUTPT CLINIC VISIT: HCPCS | Performed by: PHYSICIAN ASSISTANT

## 2019-10-23 PROCEDURE — 87077 CULTURE AEROBIC IDENTIFY: CPT | Performed by: PHYSICIAN ASSISTANT

## 2019-10-23 PROCEDURE — 87186 SC STD MICRODIL/AGAR DIL: CPT | Performed by: PHYSICIAN ASSISTANT

## 2019-10-23 PROCEDURE — 81002 URINALYSIS NONAUTO W/O SCOPE: CPT | Performed by: PHYSICIAN ASSISTANT

## 2019-10-23 PROCEDURE — 99213 OFFICE O/P EST LOW 20 MIN: CPT | Performed by: PHYSICIAN ASSISTANT

## 2019-10-23 RX ORDER — FUROSEMIDE 20 MG/1
20 TABLET ORAL DAILY
Qty: 90 TABLET | Refills: 3 | OUTPATIENT
Start: 2019-10-23

## 2019-10-23 RX ORDER — CEPHALEXIN 250 MG/1
250 CAPSULE ORAL EVERY 8 HOURS SCHEDULED
Qty: 21 CAPSULE | Refills: 0 | Status: SHIPPED | OUTPATIENT
Start: 2019-10-23 | End: 2019-10-30

## 2019-10-23 NOTE — TELEPHONE ENCOUNTER
Spoke to the patient last night  Patient is not on furosemide currently  Is also having nausea and vomiting and diarrhea with the new chemotherapy agent which she oncology team is addressing per the patient  Patient has not asked [facility] to refill furosemide so she is not sure why we were called   -I advised the patient to go to the ER if her current symptoms of nausea and vomiting and diarrhea did not improve by today  Patient appeared weak and stated was dehydrated on the phone last night  Patient is in agreement

## 2019-10-23 NOTE — PATIENT INSTRUCTIONS
-start antibiotics as directed  -follow up with nephrology  -follow up with ID  -ER if symptoms worsen- fevers, chills, nausea, vomiting, diarrhea

## 2019-10-23 NOTE — PROGRESS NOTES
Received a call from advanced practitioner at urgent care  Patient has urinary symptoms  Urine with pyuria  Patient was recently treated for E coli sepsis  Appears sensitive to Keflex  Will restart Keflex  Will also advised the patient to see the infectious disease team   Patient missed appointment with our office yesterday  Will reschedule  Advise the that the patient should be advised to go to the ER if symptoms worsen given the patient's frail state

## 2019-10-23 NOTE — TELEPHONE ENCOUNTER
Patient was just seen for a TCM due to a UTI  Please advise if you would order another urinalysis & culture?

## 2019-10-23 NOTE — TELEPHONE ENCOUNTER
Patient may have a possible UTI and there was no openings for Dr Woo Doing Does Dr BERNARD want to order a urine test? Ganesh Rothman can be reached at [phone] any questions

## 2019-10-23 NOTE — PROGRESS NOTES
[facility]        NAME: Shelly Leggett is a 53 y o  female  : 1965    MRN: [id number]  DATE: 2019  TIME: 2:51 PM    Assessment and Plan   Possible urinary tract infection [R39 89]  1  Possible urinary tract infection  POCT urine dip    cephalexin (KEFLEX) 250 mg capsule    Urine culture         Patient Instructions     I spoke personally with her nephrologist Dr Ladd Linker  He states he is okay with her going on 250 mg of Keflex q8h x7 days  She is to follow-up in his office in a few weeks  They will get her set up with Infectious Disease  Warning signs to go to the ER were discussed with the patient in detail  Patient was very against going to the ER today  Proceed to  ER if symptoms worsen  Chief Complaint     Chief Complaint   Patient presents with    Possible UTI     c/o burining with urination, incontinence x last night, denied calling kidney MD, d/t being hospitalized  History of Present Illness       Patient presents with urinary tract symptoms  She has a history of renal transplant and pancreas transplant in the past   She does have chronic kidney disease  She gets recurrent UTIs  She was just hospitalized beginning of October and had a UTI  She saw Infectious Disease in the hospital that put her on Keflex  She is to follow up with urologist outpatient  She has had urosepsis multiple times in the past   She states she has had a few days of burning with urination and frequency  She states she is incontinent of at baseline  She talked to nephrologists yesterday and told him that she was having some nausea vomiting and diarrhea over the weekend  She states she has not had that since Friday  She states she did not call nephrologists today due to not willing to go to the ER  She denies any fevers,chills, nausea, vomiting, diarrhea       Review of Systems   Review of Systems   Constitutional: Negative  Respiratory: Negative  Gastrointestinal: Negative  Endocrine: Positive for polyuria  Genitourinary: Positive for dysuria and frequency  Negative for flank pain  Musculoskeletal: Negative  Neurological: Negative  Psychiatric/Behavioral: Negative            Current Medications       Current Outpatient Medications:     amLODIPine (NORVASC) 10 mg tablet, TAKE 1 TABLET(10MG) BY MOUTH EVERY MORNING AND 1/2 TABLET(5MG) EVERY EVENING (Patient taking differently: Take 10 mg by mouth every morning TAKE 1 TABLET(10MG) BY MOUTH EVERY MORNING AND 1/2 TABLET(5MG) EVERY EVENING), Disp: 135 tablet, Rfl: 0    ARIPiprazole (ABILIFY) 30 mg tablet, Take 1 tablet (30 mg total) by mouth daily at bedtime for 180 days, Disp: 90 tablet, Rfl: 1    aspirin 81 MG tablet, Take 1 tablet by mouth daily, Disp: , Rfl:     busPIRone (BUSPAR) 5 mg tablet, Take 1 tablet (5 mg total) by mouth 2 (two) times a day for 180 days, Disp: 180 tablet, Rfl: 1    cephalexin (KEFLEX) 250 mg capsule, Take 1 capsule (250 mg total) by mouth every 8 (eight) hours for 7 days, Disp: 21 capsule, Rfl: 0    Cholecalciferol (VITAMIN D3) 1000 units CAPS, Take 3 capsules by mouth daily  , Disp: , Rfl:     cloNIDine (CATAPRES) 0 1 mg tablet, TAKE 3 TABLETS BY MOUTH EVERY MORNING, 3 TABLETS AT NOON, AND 3 TABLETS EVERY EVENING (Patient taking differently: 0 3 mg every 8 (eight) hours TAKE 3 TABLETS BY MOUTH EVERY MORNING, 3 TABLETS AT NOON, AND 3 TABLETS EVERY EVENING), Disp: 810 tablet, Rfl: 4    doxazosin (CARDURA) 4 mg tablet, Take 1 tablet (4 mg total) by mouth daily at bedtime, Disp: 30 tablet, Rfl: 0    DULoxetine (CYMBALTA) 60 mg delayed release capsule, Take 1 capsule (60 mg total) by mouth 2 (two) times a day for 180 days, Disp: 180 capsule, Rfl: 1    ferrous sulfate 325 (65 Fe) mg tablet, Take 1 tablet (325 mg total) by mouth daily with breakfast, Disp: 30 tablet, Rfl: 0    folic acid (FOLVITE) 1 mg tablet, TAKE 1 TABLET BY MOUTH DAILY AS DIRECTED, Disp: 90 tablet, Rfl: 3    hydrALAZINE (APRESOLINE) 50 mg tablet, TAKE 1 TABLET(50 MG TOTAL) BY MOUTH THREE TIMES DAILY FOR 90 DAYS, Disp: 270 tablet, Rfl: 0    Insulin Glargine (TOUJEO SOLOSTAR) 300 units/mL CONCETRATED U-300 injection pen, Inject 1 Units under the skin daily at bedtime, Disp: 4 pen, Rfl: 0    insulin lispro (HUMALOG KWIKPEN) 100 units/mL injection pen, INJECT 10 UNDER THE SKIN EVERY DAY OR AS DIRECTED (Patient taking differently: INJECT 10 UNDER THE SKIN EVERY DAY OR AS DIRECTED), Disp: 45 mL, Rfl: 1    Insulin Pen Needle (BD PEN NEEDLE VISHNU U/F) 32G X 4 MM MISC, Use 4 times daily, Disp: 400 each, Rfl: 1    Lancets (ONETOUCH ULTRASOFT) lancets, by Does not apply route 4 (four) times a day  , Disp: , Rfl:     lenalidomide (REVLIMID) 5 MG CAPS, Take 1 capsule (5 mg total) by mouth every other day 3 weeks on/ 1 week off   [facility] # [id number] 10/8/19, Disp: 11 capsule, Rfl: 2    levothyroxine 125 mcg tablet, Take 1 tablet (125 mcg total) by mouth daily, Disp: 90 tablet, Rfl: 2    LORazepam (ATIVAN) 2 mg tablet, Take 1 tablet (2 mg total) by mouth 3 (three) times a day as needed for anxiety for up to 120 days To be filled on or after 6/29/19, Disp: 90 tablet, Rfl: 3    metoprolol tartrate (LOPRESSOR) 50 mg tablet, TAKE 1 TABLET(50 MG TOTAL) BY MOUTH TWICE DAILY, Disp: 180 tablet, Rfl: 0    ondansetron (ZOFRAN-ODT) 8 mg disintegrating tablet, Take 1 tablet (8 mg total) by mouth every 8 (eight) hours as needed for nausea or vomiting, Disp: 30 tablet, Rfl: 1    ONE TOUCH ULTRA TEST test strip, Use 4 times daily ( please dispense One touch Ultra test strips), Disp: 400 each, Rfl: 1    pravastatin (PRAVACHOL) 80 mg tablet, TAKE 1 TABLET BY MOUTH DAILY, Disp: 90 tablet, Rfl: 5    predniSONE 5 mg tablet, Take 1 tablet (5 mg total) by mouth daily, Disp: 90 tablet, Rfl: 3    rOPINIRole (REQUIP) 0 25 mg tablet, TAKE 1 TABLET BY MOUTH TWICE DAILY, Disp: 180 tablet, Rfl: 0    sertraline (ZOLOFT) 100 mg tablet, Take 2 tablets (200 mg total) by mouth daily for 180 days, Disp: 180 tablet, Rfl: 1    sevelamer carbonate (RENVELA) 800 mg tablet, Take 1 tablet (800 mg total) by mouth 3 (three) times a day with meals, Disp: 270 tablet, Rfl: 3    sodium bicarbonate 650 mg tablet, TAKE 2 TABLETS BY MOUTH THREE TIMES DAILY, Disp: 180 tablet, Rfl: 0    tacrolimus (PROGRAF) 1 mg capsule, Take 3 mg in AM and 2 mg in PM (6/18/19) (Patient taking differently: Take 3 mg by mouth every 12 (twelve) hours ), Disp: 180 capsule, Rfl: 6    Current Allergies     Allergies as of 10/23/2019 - Reviewed 10/23/2019   Allergen Reaction Noted    Ixazomib Rash 08/20/2019    Revlimid [lenalidomide] Rash 08/19/2019    Cefadroxil  04/03/2013    Latex Rash 08/17/2019    Morphine Other (See Comments)     Morphine and related GI Intolerance 04/06/2016    Myrbetriq [mirabegron] Hives 10/13/2016    Penicillins Hives 04/06/2016            The following portions of the patient's history were reviewed and updated as appropriate: allergies, current medications, past family history, past medical history, past social history, past surgical history and problem list      Past Medical History:   Diagnosis Date    Abnormal liver function test     Acute kidney injury ([address]     Acute on chronic congestive heart failure (HCC)     Allergic urticaria     Anemia     Cancer ([address]     Multiple myeloma    Cervical dysplasia     Cholelithiasis     Chronic diastolic (congestive) heart failure ([address] ) 9/18/2017    Diabetes mellitus ([address] )     Previous, controlled with diet    Diabetes mellitus with foot ulcer ([address] )     Disease of thyroid gland     Encephalopathy     Hematuria     + leak est- secondary to UTIs/panc drainage    History of transfusion     Hyperkalemia     Hypertension     Iliotibial band syndrome     Lumbar radiculopathy     Multiple myeloma (HCC)     Multiple myeloma ([address]     Night blindness     Nonrheumatic aortic (valve) insufficiency     Pneumonia     Renal disorder     Retinopathy     Seborrhea     Se[facility]     Shingles     Sinus tachycardia     B blocker - cardio echo stress test 02 normal/neg LE doppler 2/02 OK and 12/07    Status post simultaneous kidney and pancreas transplant ([facility]     Toe amputation status     Trochanteric bursitis        Past Surgical History:   Procedure Laterality Date    CATARACT EXTRACTION      CHOLECYSTECTOMY      COLONOSCOPY      two polyps in the rectum removed and biopsied diverticulosis in the sigmoid colon, external hemorrhoiods- Dr Barfield    COMBINED KIDNEY-PANCREAS TRANSPLANT N/A     CT BONE MARROW BIOPSY AND ASPIRATION  4/17/2019    CYSTOSCOPY N/A 10/13/2016    Procedure: CYSTOSCOPY, retrograde pyelogram, biopsy of ureteral polyp; Surgeon: Dennis Man MD;  Location: [facility];  Service:    Elton Buckner DILATION AND CURETTAGE OF UTERUS      ESOPHAGOGASTRODUODENOSCOPY N/A 11/20/2017    Procedure: ESOPHAGOGASTRODUODENOSCOPY (EGD); Surgeon: Ajit May DO;  Location: [facility]; Service: Gastroenterology    EYE SURGERY      cataracts    FOOT AMPUTATION THROUGH METATARSAL Left     FOOT SURGERY Right     excision of metatarsal heads    HALLUX VALGUS CORRECTION Right     NEPHRECTOMY TRANSPLANTED ORGAN      PANCREATIC TRANSPLANT REMOVAL  1998       Family History   Problem Relation Age of Onset    Hypertension Mother     Cancer Mother     Hypertension Father     Cancer Father     Cancer Maternal Grandfather     Cancer Paternal Grandmother     Cancer Paternal Grandfather     Depression Sister     Breast cancer Maternal Grandmother 77         Medications have been verified  Objective   /62   Pulse 56   Temp (!) 97 °F (36 1 °C)   Resp 18   Ht 5' 8" (1 727 m)   Wt 102 kg (224 lb)   LMP  (LMP Unknown)   SpO2 96%   BMI 34 06 kg/m²        Physical Exam     Physical Exam   Constitutional: She is oriented to person, place, and time  She appears well-developed and well-nourished  No distress  HENT:   Head: Normocephalic and atraumatic  Eyes: EOM are normal    Cardiovascular: Normal rate, regular rhythm and normal heart sounds  Pulmonary/Chest: Effort normal and breath sounds normal    Abdominal: Soft  Bowel sounds are normal  There is no tenderness  There is no rebound and no guarding  Neurological: She is alert and oriented to person, place, and time  Skin: Skin is warm and dry  She is not diaphoretic  Psychiatric: She has a normal mood and affect  Her behavior is normal    Nursing note and vitals reviewed

## 2019-10-23 NOTE — TELEPHONE ENCOUNTER
I called patient and let her know, but she actually went to a [facility] today and was prescribed Keflex  She will call if symptoms don't improve

## 2019-10-24 ENCOUNTER — TELEPHONE (OUTPATIENT)
Dept: NEPHROLOGY | Facility: CLINIC | Age: 54
End: 2019-10-24

## 2019-10-24 ENCOUNTER — TELEPHONE (OUTPATIENT)
Dept: INFECTIOUS DISEASES | Facility: CLINIC | Age: 54
End: 2019-10-24

## 2019-10-24 NOTE — TELEPHONE ENCOUNTER
Patient is being referred to Infectious Disease  I called the office to schedule an appointment but they told me their nurse will review and will call the patient to schedule  I left a message for Coy Berry to let her know

## 2019-10-24 NOTE — TELEPHONE ENCOUNTER
Pt referred back to our office by Dr Jane Zaidi   Pt was seen inpt by Dr Shabbir Davies earlier in Oct      I left VM for pt to call back to schedule f/u with Dr Shabbir Davies

## 2019-10-25 LAB
BACTERIA UR CULT: ABNORMAL
BACTERIA UR CULT: ABNORMAL

## 2019-10-30 ENCOUNTER — APPOINTMENT (OUTPATIENT)
Dept: LAB | Age: 54
End: 2019-10-30
Payer: MEDICARE

## 2019-10-30 ENCOUNTER — DOCUMENTATION (OUTPATIENT)
Dept: NEPHROLOGY | Facility: CLINIC | Age: 54
End: 2019-10-30

## 2019-10-30 ENCOUNTER — TELEPHONE (OUTPATIENT)
Dept: NEPHROLOGY | Facility: CLINIC | Age: 54
End: 2019-10-30

## 2019-10-30 ENCOUNTER — TRANSCRIBE ORDERS (OUTPATIENT)
Dept: ADMINISTRATIVE | Age: 54
End: 2019-10-30

## 2019-10-30 ENCOUNTER — TELEPHONE (OUTPATIENT)
Dept: FAMILY MEDICINE CLINIC | Facility: CLINIC | Age: 54
End: 2019-10-30

## 2019-10-30 DIAGNOSIS — N18.4 ACUTE RENAL FAILURE SUPERIMPOSED ON STAGE 4 CHRONIC KIDNEY DISEASE, UNSPECIFIED ACUTE RENAL FAILURE TYPE (HCC): ICD-10-CM

## 2019-10-30 DIAGNOSIS — Z94.0 RENAL TRANSPLANT RECIPIENT: Primary | ICD-10-CM

## 2019-10-30 DIAGNOSIS — Z94.0 STATUS POST KIDNEY TRANSPLANT: ICD-10-CM

## 2019-10-30 DIAGNOSIS — C90.00 MULTIPLE MYELOMA NOT HAVING ACHIEVED REMISSION (HCC): ICD-10-CM

## 2019-10-30 DIAGNOSIS — Z94.9 TRANSPLANT: ICD-10-CM

## 2019-10-30 DIAGNOSIS — N18.4 CHRONIC KIDNEY DISEASE, STAGE 4 (SEVERE) (HCC): ICD-10-CM

## 2019-10-30 DIAGNOSIS — N17.9 ACUTE RENAL FAILURE SUPERIMPOSED ON STAGE 4 CHRONIC KIDNEY DISEASE, UNSPECIFIED ACUTE RENAL FAILURE TYPE (HCC): ICD-10-CM

## 2019-10-30 LAB
ALBUMIN SERPL BCP-MCNC: 3.2 G/DL (ref 3.5–5)
ALP SERPL-CCNC: 110 U/L (ref 46–116)
ALT SERPL W P-5'-P-CCNC: 22 U/L (ref 12–78)
ANION GAP SERPL CALCULATED.3IONS-SCNC: 9 MMOL/L (ref 4–13)
AST SERPL W P-5'-P-CCNC: 13 U/L (ref 5–45)
BACTERIA UR QL AUTO: ABNORMAL /HPF
BASOPHILS # BLD AUTO: 0.09 THOUSANDS/ΜL (ref 0–0.1)
BASOPHILS NFR BLD AUTO: 2 % (ref 0–1)
BILIRUB SERPL-MCNC: 0.34 MG/DL (ref 0.2–1)
BILIRUB UR QL STRIP: NEGATIVE
BUN SERPL-MCNC: 47 MG/DL (ref 5–25)
CALCIUM SERPL-MCNC: 8.8 MG/DL (ref 8.3–10.1)
CHLORIDE SERPL-SCNC: 114 MMOL/L (ref 100–108)
CLARITY UR: CLEAR
CO2 SERPL-SCNC: 18 MMOL/L (ref 21–32)
COLOR UR: YELLOW
CREAT SERPL-MCNC: 2.53 MG/DL (ref 0.6–1.3)
CREAT UR-MCNC: 61.7 MG/DL
EOSINOPHIL # BLD AUTO: 0.35 THOUSAND/ΜL (ref 0–0.61)
EOSINOPHIL NFR BLD AUTO: 6 % (ref 0–6)
ERYTHROCYTE [DISTWIDTH] IN BLOOD BY AUTOMATED COUNT: 17.5 % (ref 11.6–15.1)
GFR SERPL CREATININE-BSD FRML MDRD: 21 ML/MIN/1.73SQ M
GLUCOSE P FAST SERPL-MCNC: 133 MG/DL (ref 65–99)
GLUCOSE UR STRIP-MCNC: NEGATIVE MG/DL
HCT VFR BLD AUTO: 25.6 % (ref 34.8–46.1)
HGB BLD-MCNC: 7.9 G/DL (ref 11.5–15.4)
HGB UR QL STRIP.AUTO: NEGATIVE
IGA SERPL-MCNC: 102 MG/DL (ref 70–400)
IGG SERPL-MCNC: 2050 MG/DL (ref 700–1600)
IGM SERPL-MCNC: 52 MG/DL (ref 40–230)
IMM GRANULOCYTES # BLD AUTO: 0.04 THOUSAND/UL (ref 0–0.2)
IMM GRANULOCYTES NFR BLD AUTO: 1 % (ref 0–2)
KETONES UR STRIP-MCNC: NEGATIVE MG/DL
LEUKOCYTE ESTERASE UR QL STRIP: ABNORMAL
LYMPHOCYTES # BLD AUTO: 1.01 THOUSANDS/ΜL (ref 0.6–4.47)
LYMPHOCYTES NFR BLD AUTO: 18 % (ref 14–44)
MAGNESIUM SERPL-MCNC: 2 MG/DL (ref 1.6–2.6)
MCH RBC QN AUTO: 30.6 PG (ref 26.8–34.3)
MCHC RBC AUTO-ENTMCNC: 30.9 G/DL (ref 31.4–37.4)
MCV RBC AUTO: 99 FL (ref 82–98)
MONOCYTES # BLD AUTO: 0.48 THOUSAND/ΜL (ref 0.17–1.22)
MONOCYTES NFR BLD AUTO: 9 % (ref 4–12)
NEUTROPHILS # BLD AUTO: 3.71 THOUSANDS/ΜL (ref 1.85–7.62)
NEUTS SEG NFR BLD AUTO: 64 % (ref 43–75)
NITRITE UR QL STRIP: NEGATIVE
NON-SQ EPI CELLS URNS QL MICRO: ABNORMAL /HPF
NRBC BLD AUTO-RTO: 0 /100 WBCS
PH UR STRIP.AUTO: 6.5 [PH]
PHOSPHATE SERPL-MCNC: 4.6 MG/DL (ref 2.7–4.5)
PLATELET # BLD AUTO: 179 THOUSANDS/UL (ref 149–390)
PMV BLD AUTO: 12 FL (ref 8.9–12.7)
POTASSIUM SERPL-SCNC: 4.7 MMOL/L (ref 3.5–5.3)
PROT SERPL-MCNC: 8.1 G/DL (ref 6.4–8.2)
PROT UR STRIP-MCNC: ABNORMAL MG/DL
PROT UR-MCNC: 151 MG/DL
PROT/CREAT UR: 2.45 MG/G{CREAT} (ref 0–0.1)
RBC # BLD AUTO: 2.58 MILLION/UL (ref 3.81–5.12)
RBC #/AREA URNS AUTO: ABNORMAL /HPF
SODIUM SERPL-SCNC: 141 MMOL/L (ref 136–145)
SP GR UR STRIP.AUTO: 1.02 (ref 1–1.03)
UROBILINOGEN UR QL STRIP.AUTO: 0.2 E.U./DL
WBC # BLD AUTO: 5.68 THOUSAND/UL (ref 4.31–10.16)
WBC #/AREA URNS AUTO: ABNORMAL /HPF

## 2019-10-30 PROCEDURE — 83883 ASSAY NEPHELOMETRY NOT SPEC: CPT

## 2019-10-30 PROCEDURE — 81001 URINALYSIS AUTO W/SCOPE: CPT | Performed by: INTERNAL MEDICINE

## 2019-10-30 PROCEDURE — 80197 ASSAY OF TACROLIMUS: CPT | Performed by: INTERNAL MEDICINE

## 2019-10-30 PROCEDURE — 84156 ASSAY OF PROTEIN URINE: CPT | Performed by: INTERNAL MEDICINE

## 2019-10-30 PROCEDURE — 36415 COLL VENOUS BLD VENIPUNCTURE: CPT | Performed by: INTERNAL MEDICINE

## 2019-10-30 PROCEDURE — 82784 ASSAY IGA/IGD/IGG/IGM EACH: CPT

## 2019-10-30 PROCEDURE — 84165 PROTEIN E-PHORESIS SERUM: CPT

## 2019-10-30 PROCEDURE — 84100 ASSAY OF PHOSPHORUS: CPT | Performed by: INTERNAL MEDICINE

## 2019-10-30 PROCEDURE — 83735 ASSAY OF MAGNESIUM: CPT | Performed by: INTERNAL MEDICINE

## 2019-10-30 PROCEDURE — 84165 PROTEIN E-PHORESIS SERUM: CPT | Performed by: PATHOLOGY

## 2019-10-30 PROCEDURE — 80053 COMPREHEN METABOLIC PANEL: CPT | Performed by: INTERNAL MEDICINE

## 2019-10-30 PROCEDURE — 85025 COMPLETE CBC W/AUTO DIFF WBC: CPT

## 2019-10-30 PROCEDURE — 82570 ASSAY OF URINE CREATININE: CPT | Performed by: INTERNAL MEDICINE

## 2019-10-30 NOTE — TELEPHONE ENCOUNTER
[facility] calling to say they are starting physical therapy at home  Evan Payment can be reached at [phone] any questions

## 2019-10-30 NOTE — RESULT ENCOUNTER NOTE
Hello    Patient normally is followed up by Ms Ed Cloud MA Team - Attempted to call the patient  No answer  Can you please let the patient know that the creatinine is slightly rising bicarbonate is lower    The urine looks  now with less WBCs  -house the patient feeling?  -can you please encourage the patient to drink at least 2-3 L of fluids a day  -can you please have the patient complete a CMP and a tacrolimus level either this Friday or Monday at the latest if she is feeling well  -if the patient is not feeling well-fevers, chills, nausea, vomiting, any other symptoms, please let me know    Thank you    np

## 2019-10-30 NOTE — TELEPHONE ENCOUNTER
----- Message from Joana Navarrete MD sent at 10/30/2019  4:43 PM EDT -----  Hello    Patient normally is followed up by Ms Gt Allen MA Team - Attempted to call the patient  No answer  Can you please let the patient know that the creatinine is slightly rising bicarbonate is lower    The urine looks  now with less WBCs  -house the patient feeling?  -can you please encourage the patient to drink at least 2-3 L of fluids a day  -can you please have the patient complete a CMP and a tacrolimus level either this Friday or Monday at the latest if she is feeling well  -if the patient is not feeling well-fevers, chills, nausea, vomiting, any other symptoms, please let me know    Thank you    np

## 2019-10-31 LAB
ALBUMIN SERPL ELPH-MCNC: 3.53 G/DL (ref 3.5–5)
ALBUMIN SERPL ELPH-MCNC: 45.8 % (ref 52–65)
ALPHA1 GLOB SERPL ELPH-MCNC: 0.39 G/DL (ref 0.1–0.4)
ALPHA1 GLOB SERPL ELPH-MCNC: 5 % (ref 2.5–5)
ALPHA2 GLOB SERPL ELPH-MCNC: 0.84 G/DL (ref 0.4–1.2)
ALPHA2 GLOB SERPL ELPH-MCNC: 10.9 % (ref 7–13)
BETA GLOB ABNORMAL SERPL ELPH-MCNC: 0.37 G/DL (ref 0.4–0.8)
BETA1 GLOB SERPL ELPH-MCNC: 4.8 % (ref 5–13)
BETA2 GLOB SERPL ELPH-MCNC: 7 % (ref 2–8)
BETA2+GAMMA GLOB SERPL ELPH-MCNC: 0.54 G/DL (ref 0.2–0.5)
GAMMA GLOB ABNORMAL SERPL ELPH-MCNC: 2.04 G/DL (ref 0.5–1.6)
GAMMA GLOB SERPL ELPH-MCNC: 26.5 % (ref 12–22)
IGG/ALB SER: 0.85 {RATIO} (ref 1.1–1.8)
KAPPA LC FREE SER-MCNC: 112.9 MG/L (ref 3.3–19.4)
KAPPA LC FREE/LAMBDA FREE SER: 2.08 {RATIO} (ref 0.26–1.65)
LAMBDA LC FREE SERPL-MCNC: 54.4 MG/L (ref 5.7–26.3)
M PEAK ID 2: 10.8 %
M PROTEIN 1 MFR SERPL ELPH: 3.7 %
M PROTEIN 1 SERPL ELPH-MCNC: 0.28 G/DL
M PROTEIN 2 SERPL ELPH-MCNC: 0.83 G/DL
PROT PATTERN SERPL ELPH-IMP: ABNORMAL
PROT SERPL-MCNC: 7.7 G/DL (ref 6.4–8.2)
TACROLIMUS BLD-MCNC: 3.3 NG/ML (ref 2–20)

## 2019-11-01 ENCOUNTER — TELEPHONE (OUTPATIENT)
Dept: FAMILY MEDICINE CLINIC | Facility: CLINIC | Age: 54
End: 2019-11-01

## 2019-11-01 ENCOUNTER — OFFICE VISIT (OUTPATIENT)
Dept: INFECTIOUS DISEASES | Facility: CLINIC | Age: 54
End: 2019-11-01
Payer: MEDICARE

## 2019-11-01 ENCOUNTER — TELEPHONE (OUTPATIENT)
Dept: HEMATOLOGY ONCOLOGY | Facility: CLINIC | Age: 54
End: 2019-11-01

## 2019-11-01 VITALS
HEIGHT: 68 IN | DIASTOLIC BLOOD PRESSURE: 58 MMHG | TEMPERATURE: 97.4 F | BODY MASS INDEX: 34.06 KG/M2 | SYSTOLIC BLOOD PRESSURE: 138 MMHG | HEART RATE: 61 BPM

## 2019-11-01 DIAGNOSIS — N18.4 CKD (CHRONIC KIDNEY DISEASE) STAGE 4, GFR 15-29 ML/MIN (HCC): ICD-10-CM

## 2019-11-01 DIAGNOSIS — C90.00 MULTIPLE MYELOMA NOT HAVING ACHIEVED REMISSION (HCC): ICD-10-CM

## 2019-11-01 DIAGNOSIS — Z94.0 RENAL TRANSPLANT, STATUS POST: ICD-10-CM

## 2019-11-01 DIAGNOSIS — N39.0 RECURRENT UTI: Primary | ICD-10-CM

## 2019-11-01 PROCEDURE — 99214 OFFICE O/P EST MOD 30 MIN: CPT | Performed by: INTERNAL MEDICINE

## 2019-11-01 RX ORDER — CEPHALEXIN 500 MG/1
500 CAPSULE ORAL EVERY 8 HOURS SCHEDULED
Qty: 21 CAPSULE | Refills: 0 | Status: SHIPPED | OUTPATIENT
Start: 2019-11-01 | End: 2019-11-08 | Stop reason: HOSPADM

## 2019-11-01 NOTE — TELEPHONE ENCOUNTER
Tc spoke with pt  Explained per Dr Jocelyn Felix ok to take Keflex as Rx by Dr Francisco Hernández while taking keflex  She stated good she understands

## 2019-11-01 NOTE — PROGRESS NOTES
Progress Note - Infectious Disease   Keaton Sheikh 53 y o  female MRN: [id number]  Unit/Bed#:  Encounter: [id number]      Impression/Recommendations:  1  Recurrent UTI  Patient has difficulty getting to the ER and is usually septic by the time to get to the ER  She has CKD and antibiotic allergies, which limit choice of antibiotic  Low-dose suppression antibiotic would lead to resistant rapidly in this patient and further limit antibiotic options  In this patient, I would like to try the pre-emptive treatment approach  Patient will have antibiotic at home, to take at the 1st sign of infection  Rx for 7 day course of Keflex to keep at home  Patient is to take Keflex at the 1st sign of UTI  She is to notify us when this happens      2  CKD stage 4  Creatinine baseline      3  Status post distant kidney and pancreas transplantation   Patient is on stable anti rejection regimen      4  Multiple myeloma   Chemotherapy is currently on hold   Patient is not neutropenic  5  PCN and cefadroxil allergy  Patient is able to tolerate Keflex    Discussed with patient in detail regarding the above plan  Follow-up with me p r n       Antibiotics:  None    Subjective:  Patient is status post distant renal transplant, with CKD, who has recurrent UTI, last admitted here in early October of this year  She did well with antibiotic course  At present, she has no UTI  Patient states that she know she has UTI when she started becoming confused  However, it isn't always easy for her to get to the ER  Sometimes, it takes her hours to give to the ER and therefore, does not get antibiotic until hours after initial symptoms      The following portions of the patient's history were reviewed and updated as appropriate: allergies, current medications, past medical history, past social history, past surgical history and problem list     Objective:  Vitals:  Temperature: (!) 97 4 °F (36 3 °C)    Physical Exam:     General: Awake, alert, cooperative, no distress  Chronic but not acutely ill appearing  Neck:  Supple  No lymphadenopathy  No mass  Lungs: Expansion symmetric, no rales, no wheezing, respirations unlabored  Heart:  Regular rate and rhythm, S1 and S2 normal, no murmur  Abdomen: Soft, nondistended, non-tender, bowel sounds active all four quadrants,        no masses, no organomegaly  Extremities: Trace edema  No erythema/warmth  No ulcer  Nontender to palpation  Skin:  No rash  Neuro: Moves all extremities  Invasive Devices     None                 Labs studies:   I have personally reviewed pertinent labs  Results from last 7 days   Lab Units 10/30/19  0834   POTASSIUM mmol/L 4 7   CHLORIDE mmol/L 114*   CO2 mmol/L 18*   BUN mg/dL 47*   CREATININE mg/dL 2 53*   EGFR ml/min/1 73sq m 21   CALCIUM mg/dL 8 8   AST U/L 13   ALT U/L 22   ALK PHOS U/L 110     Results from last 7 days   Lab Units 10/30/19  0834   WBC Thousand/uL 5 68   HEMOGLOBIN g/dL 7 9*   HEMATOCRIT % 25 6*   PLATELETS Thousands/uL 179   NEUTROS PCT % 64   MONOS PCT % 9           Imaging Studies:   I have personally reviewed pertinent imaging study reports and images in PACS  EKG, Pathology, and Other Studies:   I have personally reviewed pertinent reports

## 2019-11-01 NOTE — TELEPHONE ENCOUNTER
Santiago Champion from visiting nurses called to say she needs a script/order for a bedside commode to be sent to [facility]  Their fax number is [phone]

## 2019-11-01 NOTE — PATIENT INSTRUCTIONS
We have called in antibiotic for you which you may take as needed  If you do use it, please call us so that we can order you a refill to keep in your home for back up  You may follow up in our office as needed

## 2019-11-01 NOTE — TELEPHONE ENCOUNTER
Maggi Villa from infectious disease called stating that she wanted to advise that the patient takes the medication Keflex as needed  She stated that she do not take it every day  Any further questions Maggi Villa can be reached at [phone]

## 2019-11-01 NOTE — TELEPHONE ENCOUNTER
Vijay Whitney called stating that Dr Gm Long wants her to take Keflex everyday due to her increased numbers of UTI  Patient stated that because she is on Revlimid she wanted to know if was ok with Dr Layo Eduardo if she took the Keflex or if he recommends something else  Please review and contact patient

## 2019-11-01 NOTE — TELEPHONE ENCOUNTER
Jeana Dobson called back to give us an updated phone number  She will be home in a half hour   Please call her at [phone]

## 2019-11-04 ENCOUNTER — TRANSCRIBE ORDERS (OUTPATIENT)
Dept: LAB | Facility: HOSPITAL | Age: 54
End: 2019-11-04

## 2019-11-04 ENCOUNTER — LAB (OUTPATIENT)
Dept: LAB | Age: 54
DRG: 682 | End: 2019-11-04
Payer: MEDICARE

## 2019-11-04 ENCOUNTER — TELEPHONE (OUTPATIENT)
Dept: NEPHROLOGY | Facility: CLINIC | Age: 54
End: 2019-11-04

## 2019-11-04 DIAGNOSIS — C90.00 MULTIPLE MYELOMA NOT HAVING ACHIEVED REMISSION (HCC): ICD-10-CM

## 2019-11-04 DIAGNOSIS — Z94.0 RENAL TRANSPLANT RECIPIENT: ICD-10-CM

## 2019-11-04 DIAGNOSIS — N39.0 RECURRENT UTI: Primary | ICD-10-CM

## 2019-11-04 DIAGNOSIS — N17.9 ACUTE KIDNEY INJURY (HCC): Primary | ICD-10-CM

## 2019-11-04 LAB
ALBUMIN SERPL BCP-MCNC: 3.3 G/DL (ref 3.5–5)
ALP SERPL-CCNC: 102 U/L (ref 46–116)
ALT SERPL W P-5'-P-CCNC: 24 U/L (ref 12–78)
ANION GAP SERPL CALCULATED.3IONS-SCNC: 7 MMOL/L (ref 4–13)
AST SERPL W P-5'-P-CCNC: 15 U/L (ref 5–45)
BILIRUB SERPL-MCNC: 0.4 MG/DL (ref 0.2–1)
BUN SERPL-MCNC: 47 MG/DL (ref 5–25)
CALCIUM SERPL-MCNC: 8.5 MG/DL (ref 8.3–10.1)
CHLORIDE SERPL-SCNC: 116 MMOL/L (ref 100–108)
CO2 SERPL-SCNC: 16 MMOL/L (ref 21–32)
CREAT SERPL-MCNC: 2.76 MG/DL (ref 0.6–1.3)
GFR SERPL CREATININE-BSD FRML MDRD: 19 ML/MIN/1.73SQ M
GLUCOSE P FAST SERPL-MCNC: 121 MG/DL (ref 65–99)
MAGNESIUM SERPL-MCNC: 2.5 MG/DL (ref 1.6–2.6)
PHOSPHATE SERPL-MCNC: 3.7 MG/DL (ref 2.7–4.5)
POTASSIUM SERPL-SCNC: 4.4 MMOL/L (ref 3.5–5.3)
PROT SERPL-MCNC: 8.1 G/DL (ref 6.4–8.2)
SODIUM SERPL-SCNC: 139 MMOL/L (ref 136–145)
TACROLIMUS BLD-MCNC: 4 NG/ML (ref 2–20)

## 2019-11-04 PROCEDURE — 84100 ASSAY OF PHOSPHORUS: CPT

## 2019-11-04 PROCEDURE — 80197 ASSAY OF TACROLIMUS: CPT

## 2019-11-04 PROCEDURE — 36415 COLL VENOUS BLD VENIPUNCTURE: CPT

## 2019-11-04 PROCEDURE — 83735 ASSAY OF MAGNESIUM: CPT

## 2019-11-04 PROCEDURE — 80053 COMPREHEN METABOLIC PANEL: CPT

## 2019-11-04 PROCEDURE — 87086 URINE CULTURE/COLONY COUNT: CPT | Performed by: FAMILY MEDICINE

## 2019-11-04 RX ORDER — LENALIDOMIDE 5 MG/1
5 CAPSULE ORAL
Qty: 11 CAPSULE | Refills: 0 | Status: SHIPPED | OUTPATIENT
Start: 2019-11-04 | End: 2019-11-08 | Stop reason: HOSPADM

## 2019-11-04 NOTE — TELEPHONE ENCOUNTER
Called the patient  Patient has been having increased urinary frequency and started Keflex yesterday which the patient had from the infectious disease team   Lab work pending from today further urine studies  I will ask our office to see if the lab can run a urinalysis  -patient is feeling weak but better today than yesterday  Bicarbonate is low  Creatinine is rising   -advised the patient to continue fluid intake and also to repeat blood work on Wednesday or Thursday which I have placed in the chart   -patient knows to go to the ER feeling any worse   -if the lab work continues to worsen at the end of the week, will send the patient to the ER

## 2019-11-04 NOTE — RESULT ENCOUNTER NOTE
Hello    Patient normally is followed up by Ms Kaleigh Sue  Spoke with the patient   -can you please have the lab run the patient's urinalysis and urine microscopy from today  There and the urine culture but not the other urine studies  -patient will have BMP on Wednesday or Thursday  -if the patient calls our office stating that she is not feeling well, she needs to go right to the ER  She is aware of this, but sometimes will call us 1st to let us know    If she does call, please let me know also    Thank you    np

## 2019-11-05 ENCOUNTER — APPOINTMENT (EMERGENCY)
Dept: RADIOLOGY | Facility: HOSPITAL | Age: 54
DRG: 682 | End: 2019-11-05
Payer: MEDICARE

## 2019-11-05 ENCOUNTER — APPOINTMENT (INPATIENT)
Dept: RADIOLOGY | Facility: HOSPITAL | Age: 54
DRG: 682 | End: 2019-11-05
Payer: MEDICARE

## 2019-11-05 ENCOUNTER — TELEPHONE (OUTPATIENT)
Dept: NEPHROLOGY | Facility: CLINIC | Age: 54
End: 2019-11-05

## 2019-11-05 ENCOUNTER — APPOINTMENT (OUTPATIENT)
Dept: LAB | Age: 54
DRG: 682 | End: 2019-11-05
Payer: MEDICARE

## 2019-11-05 ENCOUNTER — HOSPITAL ENCOUNTER (INPATIENT)
Facility: HOSPITAL | Age: 54
LOS: 3 days | Discharge: HOME HEALTH | DRG: 682 | End: 2019-11-08
Attending: EMERGENCY MEDICINE | Admitting: INTERNAL MEDICINE
Payer: MEDICARE

## 2019-11-05 DIAGNOSIS — E87.8 LOW BICARBONATE: ICD-10-CM

## 2019-11-05 DIAGNOSIS — Z94.0 RENAL TRANSPLANT RECIPIENT: ICD-10-CM

## 2019-11-05 DIAGNOSIS — T86.10 RENAL TRANSPLANT DISORDER: Primary | ICD-10-CM

## 2019-11-05 DIAGNOSIS — N18.9 CHRONIC KIDNEY DISEASE: ICD-10-CM

## 2019-11-05 DIAGNOSIS — N39.0 UTI (URINARY TRACT INFECTION): ICD-10-CM

## 2019-11-05 LAB
ALBUMIN SERPL BCP-MCNC: 3.5 G/DL (ref 3.5–5)
ALP SERPL-CCNC: 111 U/L (ref 46–116)
ALT SERPL W P-5'-P-CCNC: 18 U/L (ref 12–78)
ANION GAP SERPL CALCULATED.3IONS-SCNC: 7 MMOL/L (ref 4–13)
AST SERPL W P-5'-P-CCNC: 10 U/L (ref 5–45)
BACTERIA UR CULT: NORMAL
BACTERIA UR QL AUTO: ABNORMAL /HPF
BACTERIA UR QL AUTO: ABNORMAL /HPF
BASOPHILS # BLD AUTO: 0.07 THOUSANDS/ΜL (ref 0–0.1)
BASOPHILS NFR BLD AUTO: 1 % (ref 0–1)
BILIRUB SERPL-MCNC: 0.36 MG/DL (ref 0.2–1)
BILIRUB UR QL STRIP: NEGATIVE
BILIRUB UR QL STRIP: NEGATIVE
BUN SERPL-MCNC: 50 MG/DL (ref 5–25)
CALCIUM SERPL-MCNC: 8.7 MG/DL (ref 8.3–10.1)
CHLORIDE SERPL-SCNC: 118 MMOL/L (ref 100–108)
CLARITY UR: ABNORMAL
CLARITY UR: CLEAR
CO2 SERPL-SCNC: 15 MMOL/L (ref 21–32)
COLOR UR: YELLOW
COLOR UR: YELLOW
COLOR, POC: NORMAL
CREAT SERPL-MCNC: 2.86 MG/DL (ref 0.6–1.3)
CREAT UR-MCNC: 38.3 MG/DL
EOSINOPHIL # BLD AUTO: 0.41 THOUSAND/ΜL (ref 0–0.61)
EOSINOPHIL NFR BLD AUTO: 7 % (ref 0–6)
ERYTHROCYTE [DISTWIDTH] IN BLOOD BY AUTOMATED COUNT: 18.7 % (ref 11.6–15.1)
EXT PREG TEST URINE: NEGATIVE
EXT. CONTROL ED NAV: NORMAL
GFR SERPL CREATININE-BSD FRML MDRD: 18 ML/MIN/1.73SQ M
GLUCOSE SERPL-MCNC: 111 MG/DL (ref 65–140)
GLUCOSE SERPL-MCNC: 136 MG/DL (ref 65–140)
GLUCOSE SERPL-MCNC: 234 MG/DL (ref 65–140)
GLUCOSE UR STRIP-MCNC: NEGATIVE MG/DL
GLUCOSE UR STRIP-MCNC: NEGATIVE MG/DL
HCT VFR BLD AUTO: 24.8 % (ref 34.8–46.1)
HGB BLD-MCNC: 7.5 G/DL (ref 11.5–15.4)
HGB UR QL STRIP.AUTO: NEGATIVE
HGB UR QL STRIP.AUTO: NEGATIVE
HYALINE CASTS #/AREA URNS LPF: ABNORMAL /LPF
HYALINE CASTS #/AREA URNS LPF: ABNORMAL /LPF
IMM GRANULOCYTES # BLD AUTO: 0.12 THOUSAND/UL (ref 0–0.2)
IMM GRANULOCYTES NFR BLD AUTO: 2 % (ref 0–2)
KETONES UR STRIP-MCNC: NEGATIVE MG/DL
KETONES UR STRIP-MCNC: NEGATIVE MG/DL
LEUKOCYTE ESTERASE UR QL STRIP: ABNORMAL
LEUKOCYTE ESTERASE UR QL STRIP: ABNORMAL
LYMPHOCYTES # BLD AUTO: 1.12 THOUSANDS/ΜL (ref 0.6–4.47)
LYMPHOCYTES NFR BLD AUTO: 19 % (ref 14–44)
MCH RBC QN AUTO: 30.4 PG (ref 26.8–34.3)
MCHC RBC AUTO-ENTMCNC: 30.2 G/DL (ref 31.4–37.4)
MCV RBC AUTO: 100 FL (ref 82–98)
MONOCYTES # BLD AUTO: 0.47 THOUSAND/ΜL (ref 0.17–1.22)
MONOCYTES NFR BLD AUTO: 8 % (ref 4–12)
NEUTROPHILS # BLD AUTO: 3.75 THOUSANDS/ΜL (ref 1.85–7.62)
NEUTS SEG NFR BLD AUTO: 63 % (ref 43–75)
NITRITE UR QL STRIP: NEGATIVE
NITRITE UR QL STRIP: NEGATIVE
NON-SQ EPI CELLS URNS QL MICRO: ABNORMAL /HPF
NON-SQ EPI CELLS URNS QL MICRO: ABNORMAL /HPF
NRBC BLD AUTO-RTO: 0 /100 WBCS
PH UR STRIP.AUTO: 7.5 [PH] (ref 4.5–8)
PH UR STRIP.AUTO: 8.5 [PH]
PHOSPHATE SERPL-MCNC: 3.8 MG/DL (ref 2.7–4.5)
PLATELET # BLD AUTO: 173 THOUSANDS/UL (ref 149–390)
PMV BLD AUTO: 12.8 FL (ref 8.9–12.7)
POTASSIUM SERPL-SCNC: 4.5 MMOL/L (ref 3.5–5.3)
PROT SERPL-MCNC: 8.1 G/DL (ref 6.4–8.2)
PROT UR STRIP-MCNC: ABNORMAL MG/DL
PROT UR STRIP-MCNC: ABNORMAL MG/DL
PROT UR-MCNC: 81 MG/DL
PROT/CREAT UR: 2.11 MG/G{CREAT} (ref 0–0.1)
RBC # BLD AUTO: 2.47 MILLION/UL (ref 3.81–5.12)
RBC #/AREA URNS AUTO: ABNORMAL /HPF
RBC #/AREA URNS AUTO: ABNORMAL /HPF
SODIUM SERPL-SCNC: 140 MMOL/L (ref 136–145)
SP GR UR STRIP.AUTO: 1.01 (ref 1–1.03)
SP GR UR STRIP.AUTO: 1.01 (ref 1–1.03)
UROBILINOGEN UR QL STRIP.AUTO: 0.2 E.U./DL
UROBILINOGEN UR QL STRIP.AUTO: 0.2 E.U./DL
WBC # BLD AUTO: 5.94 THOUSAND/UL (ref 4.31–10.16)
WBC #/AREA URNS AUTO: ABNORMAL /HPF
WBC #/AREA URNS AUTO: ABNORMAL /HPF

## 2019-11-05 PROCEDURE — 87799 DETECT AGENT NOS DNA QUANT: CPT | Performed by: INTERNAL MEDICINE

## 2019-11-05 PROCEDURE — 81025 URINE PREGNANCY TEST: CPT | Performed by: EMERGENCY MEDICINE

## 2019-11-05 PROCEDURE — 82570 ASSAY OF URINE CREATININE: CPT

## 2019-11-05 PROCEDURE — 80053 COMPREHEN METABOLIC PANEL: CPT | Performed by: EMERGENCY MEDICINE

## 2019-11-05 PROCEDURE — 96365 THER/PROPH/DIAG IV INF INIT: CPT

## 2019-11-05 PROCEDURE — 84100 ASSAY OF PHOSPHORUS: CPT | Performed by: EMERGENCY MEDICINE

## 2019-11-05 PROCEDURE — 36415 COLL VENOUS BLD VENIPUNCTURE: CPT | Performed by: EMERGENCY MEDICINE

## 2019-11-05 PROCEDURE — 99223 1ST HOSP IP/OBS HIGH 75: CPT | Performed by: INTERNAL MEDICINE

## 2019-11-05 PROCEDURE — 87086 URINE CULTURE/COLONY COUNT: CPT | Performed by: INTERNAL MEDICINE

## 2019-11-05 PROCEDURE — 76776 US EXAM K TRANSPL W/DOPPLER: CPT

## 2019-11-05 PROCEDURE — 99284 EMERGENCY DEPT VISIT MOD MDM: CPT

## 2019-11-05 PROCEDURE — 85025 COMPLETE CBC W/AUTO DIFF WBC: CPT | Performed by: EMERGENCY MEDICINE

## 2019-11-05 PROCEDURE — 82948 REAGENT STRIP/BLOOD GLUCOSE: CPT

## 2019-11-05 PROCEDURE — 71046 X-RAY EXAM CHEST 2 VIEWS: CPT

## 2019-11-05 PROCEDURE — 81001 URINALYSIS AUTO W/SCOPE: CPT

## 2019-11-05 PROCEDURE — 99285 EMERGENCY DEPT VISIT HI MDM: CPT | Performed by: EMERGENCY MEDICINE

## 2019-11-05 PROCEDURE — 84156 ASSAY OF PROTEIN URINE: CPT

## 2019-11-05 RX ORDER — ASPIRIN 81 MG/1
81 TABLET, CHEWABLE ORAL DAILY
Status: DISCONTINUED | OUTPATIENT
Start: 2019-11-06 | End: 2019-11-08 | Stop reason: HOSPADM

## 2019-11-05 RX ORDER — FERROUS SULFATE 325(65) MG
325 TABLET ORAL
Status: DISCONTINUED | OUTPATIENT
Start: 2019-11-06 | End: 2019-11-08 | Stop reason: HOSPADM

## 2019-11-05 RX ORDER — ONDANSETRON 2 MG/ML
4 INJECTION INTRAMUSCULAR; INTRAVENOUS EVERY 6 HOURS PRN
Status: DISCONTINUED | OUTPATIENT
Start: 2019-11-05 | End: 2019-11-08 | Stop reason: HOSPADM

## 2019-11-05 RX ORDER — PREDNISONE 1 MG/1
5 TABLET ORAL DAILY
Status: DISCONTINUED | OUTPATIENT
Start: 2019-11-06 | End: 2019-11-08 | Stop reason: HOSPADM

## 2019-11-05 RX ORDER — MELATONIN
3000 DAILY
Status: DISCONTINUED | OUTPATIENT
Start: 2019-11-06 | End: 2019-11-08 | Stop reason: HOSPADM

## 2019-11-05 RX ORDER — ARIPIPRAZOLE 15 MG/1
30 TABLET ORAL
Status: DISCONTINUED | OUTPATIENT
Start: 2019-11-05 | End: 2019-11-08 | Stop reason: HOSPADM

## 2019-11-05 RX ORDER — INSULIN GLARGINE 100 [IU]/ML
5 INJECTION, SOLUTION SUBCUTANEOUS
Status: DISCONTINUED | OUTPATIENT
Start: 2019-11-05 | End: 2019-11-08 | Stop reason: HOSPADM

## 2019-11-05 RX ORDER — HYDRALAZINE HYDROCHLORIDE 50 MG/1
50 TABLET, FILM COATED ORAL EVERY 8 HOURS SCHEDULED
Status: DISCONTINUED | OUTPATIENT
Start: 2019-11-05 | End: 2019-11-08 | Stop reason: HOSPADM

## 2019-11-05 RX ORDER — SERTRALINE HYDROCHLORIDE 100 MG/1
200 TABLET, FILM COATED ORAL DAILY
Status: DISCONTINUED | OUTPATIENT
Start: 2019-11-06 | End: 2019-11-08 | Stop reason: HOSPADM

## 2019-11-05 RX ORDER — DULOXETIN HYDROCHLORIDE 60 MG/1
60 CAPSULE, DELAYED RELEASE ORAL DAILY
Status: DISCONTINUED | OUTPATIENT
Start: 2019-11-06 | End: 2019-11-08 | Stop reason: HOSPADM

## 2019-11-05 RX ORDER — PRAVASTATIN SODIUM 80 MG/1
80 TABLET ORAL
Status: DISCONTINUED | OUTPATIENT
Start: 2019-11-05 | End: 2019-11-08 | Stop reason: HOSPADM

## 2019-11-05 RX ORDER — FOLIC ACID 1 MG/1
1000 TABLET ORAL DAILY
Status: DISCONTINUED | OUTPATIENT
Start: 2019-11-06 | End: 2019-11-08 | Stop reason: HOSPADM

## 2019-11-05 RX ORDER — CEFAZOLIN SODIUM 1 G/50ML
1000 SOLUTION INTRAVENOUS EVERY 12 HOURS
Status: DISCONTINUED | OUTPATIENT
Start: 2019-11-06 | End: 2019-11-05

## 2019-11-05 RX ORDER — ROPINIROLE 0.25 MG/1
0.25 TABLET, FILM COATED ORAL 2 TIMES DAILY
Status: DISCONTINUED | OUTPATIENT
Start: 2019-11-05 | End: 2019-11-08 | Stop reason: HOSPADM

## 2019-11-05 RX ORDER — CLONIDINE HYDROCHLORIDE 0.1 MG/1
0.3 TABLET ORAL EVERY 8 HOURS SCHEDULED
Status: DISCONTINUED | OUTPATIENT
Start: 2019-11-05 | End: 2019-11-08 | Stop reason: HOSPADM

## 2019-11-05 RX ORDER — DOXAZOSIN MESYLATE 4 MG/1
4 TABLET ORAL
Status: DISCONTINUED | OUTPATIENT
Start: 2019-11-05 | End: 2019-11-08 | Stop reason: HOSPADM

## 2019-11-05 RX ORDER — SEVELAMER HYDROCHLORIDE 800 MG/1
800 TABLET, FILM COATED ORAL
Status: DISCONTINUED | OUTPATIENT
Start: 2019-11-05 | End: 2019-11-08 | Stop reason: HOSPADM

## 2019-11-05 RX ORDER — HEPARIN SODIUM 5000 [USP'U]/ML
5000 INJECTION, SOLUTION INTRAVENOUS; SUBCUTANEOUS EVERY 8 HOURS SCHEDULED
Status: DISCONTINUED | OUTPATIENT
Start: 2019-11-05 | End: 2019-11-08 | Stop reason: HOSPADM

## 2019-11-05 RX ORDER — AMLODIPINE BESYLATE 10 MG/1
10 TABLET ORAL EVERY MORNING
Status: DISCONTINUED | OUTPATIENT
Start: 2019-11-06 | End: 2019-11-08 | Stop reason: HOSPADM

## 2019-11-05 RX ORDER — BUSPIRONE HYDROCHLORIDE 5 MG/1
5 TABLET ORAL 2 TIMES DAILY
Status: DISCONTINUED | OUTPATIENT
Start: 2019-11-05 | End: 2019-11-08 | Stop reason: HOSPADM

## 2019-11-05 RX ORDER — LEVOTHYROXINE SODIUM 0.12 MG/1
125 TABLET ORAL
Status: DISCONTINUED | OUTPATIENT
Start: 2019-11-06 | End: 2019-11-08 | Stop reason: HOSPADM

## 2019-11-05 RX ORDER — SODIUM BICARBONATE 650 MG/1
1300 TABLET ORAL 3 TIMES DAILY
Status: DISCONTINUED | OUTPATIENT
Start: 2019-11-05 | End: 2019-11-08 | Stop reason: HOSPADM

## 2019-11-05 RX ORDER — METOPROLOL TARTRATE 50 MG/1
50 TABLET, FILM COATED ORAL EVERY 12 HOURS SCHEDULED
Status: DISCONTINUED | OUTPATIENT
Start: 2019-11-05 | End: 2019-11-08 | Stop reason: HOSPADM

## 2019-11-05 RX ORDER — LORAZEPAM 1 MG/1
1 TABLET ORAL EVERY 8 HOURS PRN
Status: DISCONTINUED | OUTPATIENT
Start: 2019-11-05 | End: 2019-11-08 | Stop reason: HOSPADM

## 2019-11-05 RX ORDER — TACROLIMUS 1 MG/1
3 CAPSULE ORAL EVERY 12 HOURS SCHEDULED
Status: DISCONTINUED | OUTPATIENT
Start: 2019-11-05 | End: 2019-11-06

## 2019-11-05 RX ADMIN — ROPINIROLE HYDROCHLORIDE 0.25 MG: 0.25 TABLET, FILM COATED ORAL at 22:33

## 2019-11-05 RX ADMIN — SODIUM BICARBONATE 100 ML/HR: 84 INJECTION, SOLUTION INTRAVENOUS at 14:40

## 2019-11-05 RX ADMIN — METOPROLOL TARTRATE 50 MG: 50 TABLET, FILM COATED ORAL at 22:31

## 2019-11-05 RX ADMIN — INSULIN LISPRO 2 UNITS: 100 INJECTION, SOLUTION INTRAVENOUS; SUBCUTANEOUS at 22:37

## 2019-11-05 RX ADMIN — CLONIDINE HYDROCHLORIDE 0.3 MG: 0.1 TABLET ORAL at 22:31

## 2019-11-05 RX ADMIN — HYDRALAZINE HYDROCHLORIDE 50 MG: 50 TABLET ORAL at 18:08

## 2019-11-05 RX ADMIN — HEPARIN SODIUM 5000 UNITS: 5000 INJECTION INTRAVENOUS; SUBCUTANEOUS at 18:06

## 2019-11-05 RX ADMIN — INSULIN GLARGINE 5 UNITS: 100 INJECTION, SOLUTION SUBCUTANEOUS at 22:28

## 2019-11-05 RX ADMIN — ARIPIPRAZOLE 30 MG: 15 TABLET ORAL at 22:36

## 2019-11-05 RX ADMIN — SODIUM BICARBONATE 650 MG TABLET 1300 MG: at 22:31

## 2019-11-05 RX ADMIN — TACROLIMUS 3 MG: 1 CAPSULE ORAL at 22:33

## 2019-11-05 RX ADMIN — SEVELAMER HYDROCHLORIDE 800 MG: 800 TABLET, FILM COATED PARENTERAL at 18:59

## 2019-11-05 RX ADMIN — HEPARIN SODIUM 5000 UNITS: 5000 INJECTION INTRAVENOUS; SUBCUTANEOUS at 22:31

## 2019-11-05 RX ADMIN — SODIUM BICARBONATE 650 MG TABLET 1300 MG: at 18:07

## 2019-11-05 RX ADMIN — PRAVASTATIN SODIUM 80 MG: 80 TABLET ORAL at 18:08

## 2019-11-05 RX ADMIN — DOXAZOSIN 4 MG: 4 TABLET ORAL at 22:31

## 2019-11-05 RX ADMIN — BUSPIRONE HYDROCHLORIDE 5 MG: 5 TABLET ORAL at 18:08

## 2019-11-05 RX ADMIN — CEFTRIAXONE SODIUM 1000 MG: 10 INJECTION, POWDER, FOR SOLUTION INTRAVENOUS at 18:08

## 2019-11-05 RX ADMIN — CEFEPIME HYDROCHLORIDE 2000 MG: 2 INJECTION, POWDER, FOR SOLUTION INTRAVENOUS at 15:30

## 2019-11-05 RX ADMIN — HYDRALAZINE HYDROCHLORIDE 50 MG: 50 TABLET ORAL at 22:32

## 2019-11-05 NOTE — PROGRESS NOTES
Patient arrived from ED  No acute distress  She is waiting for her room to be cleaned and is comfortable on the stretcher outside of her room  Will continue to monitor

## 2019-11-05 NOTE — TELEPHONE ENCOUNTER
Pt is currently in the ED at \[facility]\""  Dr Ladd Linker has been made aware

## 2019-11-05 NOTE — CONSULTS
Consultation - Infectious Disease   Lucas Martinangelist 53 y o  female MRN: [id number]  Unit/Bed#: [facility] 834-01 Encounter: [id number]      IMPRESSION & RECOMMENDATIONS:   1  Symptomatic urinary tract infection  Patient presents at this time with symptomatic urinary tract infection  She reports having dysuria, frequent, and urgency  She reported progressive symptoms despite taking Keflex  Urine culture however on 11/04 is without growth and cultures today are pending  Patient has had improvement with cefepime  Would question if she has developed a resistant organism and hence persistent symptoms  Other possibilities to consider are development of chronic cystitis, viral cystitis with either BK or adenovirus  Also question if there is ongoing urinary stasis/reflux leading to recurrent episodes of infection  Place patient on IV ceftriaxone  Follow up pending urine culture  Monitor for urinary symptoms  Will obtain renal ultrasound of her transplant  Will obtain CT abdomen pelvis, PO contrast only, to rule out other intra-abdominal pathology  Will obtain BK virus PCR in blood and urine  Ordered adenovirus PCR in the urine  Ordered CMV PCR in blood and urine  Repeat CBC/BMP tomorrow  Continue to trend fever curve/vitals  Additional supportive care as per primary  Additional interventions pending clinical course  Stress to patient the need for increased fluid intake, possible urogynecologic evaluation as outpatient and addition of medications such as D-mannose  2  Chronic kidney disease  Patient with underlying chronic kidney disease  Creatinine appears to be near baseline  Recent transplant ultrasounds noted with increased indices and question of underlying rejection  Ongoing follow-up by Nephrology  Dose adjust antibiotics as needed  Additional labs ordered as above  Supportive care otherwise as per primary    3  Chronic immunosuppression and Renal transplant    Patient remains on chronic immunosuppression with tacrolimus and prednisone because of her prior transplant  Ongoing follow-up by Nephrology  Continue to monitor tacrolimus level  Additional care as above      4  Multiple myeloma  Patient remains on chemotherapy for underlying multiple myeloma  Recommend follow-up with Oncology as outpatient  Above plan was discussed in detail with the patient and her  at bedside  Primary service updated of the above plan via [employer]    ID consult service will continue to follow  HISTORY OF PRESENT ILLNESS:  Reason for Consult:  Urinary tract infection    HPI: Marline Cohn is a 53y o  year old female with past medical history significant for renal/pancreas transplant in 1998, frequent urinary tract infection, MGUS with concern for myeloma and underlying chronic kidney disease  She presented to the emergency department today with reports of feeling poorly with fatigue as well as weakness along with dysuria and incontinence  Patient has had frequent urinary tract infections and was previously recommended to keep Keflex at home should she developed symptoms  She started taking Keflex on Sunday but continued to feel worse so she contacted nephrology office and was recommended for evaluation in the emergency department  Patient was evaluated by Nephrology in the emergency department  She was noted to be at her baseline creatinine  She remains chronically on tacrolimus along with prednisone  She was noted to have a metabolic acidosis and so was started on a bicarbonate drip  Patient was admitted to the Medicine service  She had received 1 dose of cefepime in the emergency department  She has been continued on Ancef  Patient is otherwise afebrile and without leukocytosis  Urine cultures on 10/23 with E coli  Repeat cultures on 11/04 without significant growth  Current urine cultures pending and collected prior to antibiotics  Chest x-ray unremarkable  Patient's other vitals are stable  Patient's LFTs are unremarkable  For to 10 white blood cells seen on UA  Relative eosinophilia noted on differential from CBC  Previous culture data reviewed predominantly with E coli in the past   Patient was last tested for BK virus in August which was not detected in her urine  UA has never had significant blood either  We are consulted at this time for further assistance in management given this patient's recurrent urinary tract infection and her immunocompromised status  On evaluation, patient currently denies having any nausea, vomiting, chest pain or shortness of breath  She has a chronic tremor at baseline  She reports that she recently became symptomatic with frequency, urgency, dysuria and also episodes of incontinence as she would not make it to the bathroom although she continued to feel the urge to use the bathroom  She denies any new or progressive pain at her transplant site  She denies seeing any visible blood in her urine  Her  present at bedside mentions that she may not be taking adequate fluids and becomes easily fatigued on her multiple myeloma medications  He is also concerned that she has substituting sodas and juices instead of water  Patient denies any known history of pelvic prolapse and believes that she is emptying her bladder completely  She denies having any significant back pain at this time  She reports that her urinary symptoms have significantly improved after receiving dose of cefepime in the emergency department  She continues to have episodes of forgetfulness and word-finding difficulty which is chronic for her  REVIEW OF SYSTEMS:  A complete 12 point system-based review of systems is negative other than that noted in the HPI      PAST MEDICAL HISTORY:  Past Medical History:   Diagnosis Date    Abnormal liver function test     Acute kidney injury ([address] )     Acute on chronic congestive heart failure (HCC)     Allergic urticaria     Anemia     Cancer [facility])     Multiple myeloma    Cervical dysplasia     Cholelithiasis     Chronic diastolic (congestive) heart failure (HCC) 9/18/2017    Diabetes mellitus ([facility] [address] )     Previous, controlled with diet    Diabetes mellitus with foot ulcer ([facility] )     Disease of thyroid gland     Encephalopathy     Hematuria     + leak est- secondary to UTIs/panc drainage    History of transfusion     Hyperkalemia     Hypertension     Iliotibial band syndrome     Lumbar radiculopathy     Multiple myeloma (HCC)     Multiple myeloma ([facility] )     Night blindness     Nonrheumatic aortic (valve) insufficiency     Pneumonia     Renal disorder     Retinopathy     Seborrhea     [facility]     Shingles     Sinus tachycardia     B blocker - cardio echo stress test 02 normal/neg LE doppler 2/02 OK and 12/07    Status post simultaneous kidney and pancreas [facility] )     Toe amputation status     Trochanteric bursitis      Past Surgical History:   Procedure Laterality Date    CATARACT EXTRACTION      CHOLECYSTECTOMY      COLONOSCOPY      two polyps in the rectum removed and biopsied diverticulosis in the sigmoid colon, external hemorrhoiods- Dr Barfield    COMBINED KIDNEY-PANCREAS TRANSPLANT N/A     CT BONE MARROW BIOPSY AND ASPIRATION  4/17/2019    CYSTOSCOPY N/A 10/13/2016    Procedure: CYSTOSCOPY, retrograde pyelogram, biopsy of ureteral polyp; Surgeon: Kai Weber MD;  Location: [facility];  Service:    [place] DILATION AND CURETTAGE OF UTERUS      ESOPHAGOGASTRODUODENOSCOPY N/A 11/20/2017    Procedure: ESOPHAGOGASTRODUODENOSCOPY (EGD); Surgeon: Atiya Shannon DO;  Location: [facility];   Service: Gastroenterology    EYE SURGERY      cataracts    FOOT AMPUTATION THROUGH METATARSAL Left     FOOT SURGERY Right     excision of metatarsal heads    HALLUX VALGUS CORRECTION Right     NEPHRECTOMY TRANSPLANTED ORGAN      PANCREATIC TRANSPLANT REMOVAL  1998       FAMILY HISTORY:  Non-contributory    SOCIAL HISTORY:  Social History   Social History     Substance and Sexual Activity   Alcohol Use Never    Frequency: Never    Binge frequency: Never    Comment: (history)     Social History     Substance and Sexual Activity   Drug Use Never    Types: Hydrocodone    Comment: Past cocaine use many years ago - no current use     Social History     Tobacco Use   Smoking Status Former Smoker    Last attempt to quit: 2012    Years since quittin 5   Smokeless Tobacco Never Used       ALLERGIES:  Allergies   Allergen Reactions    Ixazomib Rash     Ninlaro    Revlimid [Lenalidomide] Rash    Cefadroxil     Latex Rash    Morphine Other (See Comments)     Other reaction(s): Other (See Comments)  projectile vomiting    Morphine And Related GI Intolerance    Myrbetriq [Mirabegron] Hives    Penicillins Hives     Other reaction(s): Other (See Comments)  Respiratory Distress,hives  Tolerates cefazolin       MEDICATIONS:  All current active medications have been reviewed  PHYSICAL EXAM:  Temp:  [97 5 °F (36 4 °C)] 97 5 °F (36 4 °C)  HR:  [59-66] 60  Resp:  [16-18] 18  BP: (155-185)/(67-75) 170/75  SpO2:  [95 %-98 %] 97 %  Temp (24hrs), Av 5 °F (36 4 °C), Min:97 5 °F (36 4 °C), Max:97 5 °F (36 4 °C)  Current: Temperature: 97 5 °F (36 4 °C)  No intake or output data in the 24 hours ending 19 1603    General Appearance:  Chronically ill-appearing, nontoxic, and in no distress   Head:  Normocephalic, without obvious abnormality, atraumatic   Eyes:  Conjunctiva pink and sclera anicteric, both eyes   Nose: Nares normal, mucosa normal, no drainage   Throat: Oropharynx moist without lesions; significantly poor dentition noted  Neck: Supple, symmetrical, no adenopathy, no tenderness/mass/nodules   Back:   Symmetric, no curvature, ROM normal, no CVA tenderness; no spinal or paraspinal muscle tenderness to palpation     Lungs:   Clear to auscultation bilaterally, respirations unlabored   Chest Wall:  No tenderness or deformity   Heart:  RRR; no murmur, rub or gallop   Abdomen:   Soft, non-tender, non-distended, positive bowel sounds; no reproducible pain around the transplant site  Patient without any suprapubic tenderness on palpation  Extremities: No cyanosis, clubbing or edema   Skin: No rashes or lesions  No draining wounds noted  Lymph nodes: Cervical, supraclavicular nodes normal   Neurologic: Alert and oriented times 3--patient often has word-finding difficulty on exam, extremity strength 5/5 and symmetric       LABS, IMAGING, & OTHER STUDIES:  Lab Results:  I have personally reviewed pertinent labs  Results from last 7 days   Lab Units 11/05/19  1204 10/30/19  0834   WBC Thousand/uL 5 94 5 68   HEMOGLOBIN g/dL 7 5* 7 9*   PLATELETS Thousands/uL 173 179     Results from last 7 days   Lab Units 11/05/19  1204 11/04/19  0713 10/30/19  0834   POTASSIUM mmol/L 4 5 4 4 4 7   CHLORIDE mmol/L 118* 116* 114*   CO2 mmol/L 15* 16* 18*   BUN mg/dL 50* 47* 47*   CREATININE mg/dL 2 86* 2 76* 2 53*   EGFR ml/min/1 73sq m 18 19 21   CALCIUM mg/dL 8 7 8 5 8 8   AST U/L 10 15 13   ALT U/L 18 24 22   ALK PHOS U/L 111 102 110     Results from last 7 days   Lab Units 11/04/19  0713   URINE CULTURE  <10,000 cfu/ml        Imaging Studies:   I have personally reviewed pertinent imaging study reports and images in PACS  Other Studies:   I have personally reviewed pertinent reports

## 2019-11-05 NOTE — ASSESSMENT & PLAN NOTE
Patient with history of recurrent UTI  She reported dysuria and urinary frequency  Started on Keflex outpatient 4 days ago with mild improvement in her dysuria  Change to IV cefazolin  Given 1 dose of cefepime in the ER  ID consulted

## 2019-11-05 NOTE — ED ATTENDING ATTESTATION
11/5/2019  IUlysses MD, saw and evaluated the patient  I have discussed the patient with the resident/non-physician practitioner and agree with the resident's/non-physician practitioner's findings, Plan of Care, and MDM as documented in the resident's/non-physician practitioner's note, except where noted  All available labs and Radiology studies were reviewed  I was present for key portions of any procedure(s) performed by the resident/non-physician practitioner and I was immediately available to provide assistance  At this point I agree with the current assessment done in the Emergency Department  I have conducted an independent evaluation of this patient a history and physical is as follows:    Patient had a history of a renal and pancreas transplant in the distant past   The patient reports she has had 1 week history of generalized fatigue and she spoke with her nephrologist on Monday (yesterday) and he reported that he is concerned about her kidney failing  The patient reports that approximately 2-3 days ago she developed mild dysuria and suprapubic discomfort and so she started Keflex at home  The patient denies hematuria  The patient reports chronic mild tenderness at the site of her kidney transplant that has not changed recently  The patient denies back pain  The patient admits to several loose stools per day over the last 3-4 days  The patient reports she chronically has dark stools but they have not changed in appearance the patient has not had gross blood in her stools  Physical exam demonstrates a pleasant alert nontoxic female in no acute distress  HEENT exam is normal   Lungs are clear with equal breath sounds  The heart had a regular rate rhythm  The abdomen is soft with normal bowel sounds  There is slight tenderness over the site of the renal transplant on the left abdomen without skin changes  The back was nontender    All extremities are nontender with a full range of motion  There is only trace lower extremity edema that is symmetric  There is no focal neurologic deficit      ED Course         Critical Care Time  Procedures

## 2019-11-05 NOTE — ASSESSMENT & PLAN NOTE
Patient reported some confusion and decreasing oral intak  POA, likely secondary to above  Continue with supportive care and above treatment

## 2019-11-05 NOTE — PROGRESS NOTES
Patient currently in the ER  Patient was advised to go to the ER when she called this morning with weakness and fatigue that was worsening and confusion  I have spoken to the ER team and have messaged to our inpatient nephrology team also  Would be concerned about recurrent urosepsis  Also recommended starting a bicarbonate drip    Further evaluation per the ER, primary, infectious Disease teams

## 2019-11-05 NOTE — PLAN OF CARE
Problem: Potential for Falls  Goal: Patient will remain free of falls  Description  INTERVENTIONS:  - Assess patient frequently for physical needs  -  Identify cognitive and physical deficits and behaviors that affect risk of falls    -  [place] fall precautions as indicated by assessment   - Educate patient/family on patient safety including physical limitations  - Instruct patient to call for assistance with activity based on assessment  - Modify environment to reduce risk of injury  - Consider OT/PT consult to assist with strengthening/mobility  Outcome: Progressing     Problem: PAIN - ADULT  Goal: Verbalizes/displays adequate comfort level or baseline comfort level  Description  Interventions:  - Encourage patient to monitor pain and request assistance  - Assess pain using appropriate pain scale  - Administer analgesics based on type and severity of pain and evaluate response  - Implement non-pharmacological measures as appropriate and evaluate response  - Consider cultural and social influences on pain and pain management  - Notify physician/advanced practitioner if interventions unsuccessful or patient reports new pain  Outcome: Progressing     Problem: INFECTION - ADULT  Goal: Absence or prevention of progression during hospitalization  Description  INTERVENTIONS:  - Assess and monitor for signs and symptoms of infection  - Monitor lab/diagnostic results  - Monitor all insertion sites, i e  indwelling lines, tubes, and drains  - Monitor endotracheal if appropriate and nasal secretions for changes in amount and color  - [place] appropriate cooling/warming therapies per order  - Administer medications as ordered  - Instruct and encourage patient and family to use good hand hygiene technique  - Identify and instruct in appropriate isolation precautions for identified infection/condition  Outcome: Progressing  Goal: Absence of fever/infection during neutropenic period  Description  INTERVENTIONS:  - Monitor WBC    Outcome: Progressing     Problem: SAFETY ADULT  Goal: Maintain or return to baseline ADL function  Description  INTERVENTIONS:  -  Assess patient's ability to carry out ADLs; assess patient's baseline for ADL function and identify physical deficits which impact ability to perform ADLs (bathing, care of mouth/teeth, toileting, grooming, dressing, etc )  - Assess/evaluate cause of self-care deficits   - Assess range of motion  - Assess patient's mobility; develop plan if impaired  - Assess patient's need for assistive devices and provide as appropriate  - Encourage maximum independence but intervene and supervise when necessary  - Involve family in performance of ADLs  - Assess for home care needs following discharge   - Consider OT consult to assist with ADL evaluation and planning for discharge  - Provide patient education as appropriate  Outcome: Progressing  Goal: Maintain or return mobility status to optimal level  Description  INTERVENTIONS:  - Assess patient's baseline mobility status (ambulation, transfers, stairs, etc )    - Identify cognitive and physical deficits and behaviors that affect mobility  - Identify mobility aids required to assist with transfers and/or ambulation (gait belt, sit-to-stand, lift, walker, cane, etc )  - [place] fall precautions as indicated by assessment  - Record patient progress and toleration of activity level on Mobility SBAR; progress patient to next Phase/Stage  - Instruct patient to call for assistance with activity based on assessment  - Consider rehabilitation consult to assist with strengthening/weightbearing, etc   Outcome: Progressing     Problem: DISCHARGE PLANNING  Goal: Discharge to home or other facility with appropriate resources  Description  INTERVENTIONS:  - Identify barriers to discharge w/patient and caregiver  - Arrange for needed discharge resources and transportation as appropriate  - Identify discharge learning needs (meds, wound care, etc )  - Arrange for interpretive services to assist at discharge as needed  - Refer to Case Management Department for coordinating discharge planning if the patient needs post-hospital services based on physician/advanced practitioner order or complex needs related to functional status, cognitive ability, or social support system  Outcome: Progressing     Problem: Knowledge Deficit  Goal: Patient/family/caregiver demonstrates understanding of disease process, treatment plan, medications, and discharge instructions  Description  Complete learning assessment and assess knowledge base    Interventions:  - Provide teaching at level of understanding  - Provide teaching via preferred learning methods  Outcome: Progressing

## 2019-11-05 NOTE — ED NOTES
Pt attempted to provide a urine sample and was unable to  Pt informed of the need for another attempt and given a cup for when pt feels the need to urinate        Martinez Jules RN  11/05/19 4468

## 2019-11-05 NOTE — ASSESSMENT & PLAN NOTE
With metabolic acidosis, suspect prerenal and possibility of AIN from use of Revlimid  Patient is status post kidney and pancreatic transplant in 1998,  chronic kidney disease stage 4,   Started on bicarb drip by Nephrology  Continue with nephrology evaluation

## 2019-11-05 NOTE — TELEPHONE ENCOUNTER
----- Message from Gloria Bower MD sent at 11/4/2019  4:38 PM EST -----  Hello    Patient normally is followed up by Ms Blake Sánchez  Spoke with the patient   -can you please have the lab run the patient's urinalysis and urine microscopy from today  There and the urine culture but not the other urine studies  -patient will have BMP on Wednesday or Thursday  -if the patient calls our office stating that she is not feeling well, she needs to go right to the ER  She is aware of this, but sometimes will call us 1st to let us know    If she does call, please let me know also    Thank you    np

## 2019-11-05 NOTE — H&P
H&P- Violet Small 1965, 53 y o  female MRN: [id number]    Unit/Bed#: ED 11 Encounter: [id number]    Primary Care Provider: Rosalee Ruiz MD   Date and time admitted to hospital: 11/5/2019 11:16 AM        * Acute renal failure superimposed on stage 4 chronic kidney disease ([facility] [address] )  Assessment & Plan  With metabolic acidosis, suspect prerenal and possibility of AIN from use of Revlimid  Patient is status post kidney and pancreatic transplant in 1998,  chronic kidney disease stage 4,   Started on bicarb drip by Nephrology  Continue with nephrology evaluation        UTI (urinary tract infection)  Assessment & Plan  Patient with history of recurrent UTI  She reported dysuria and urinary frequency  Started on Keflex outpatient 4 days ago with mild improvement in her dysuria  Change to IV cefazolin  Given 1 dose of cefepime in the ER  ID consulted    Acute metabolic encephalopathy  Assessment & Plan  Patient reported some confusion and decreasing oral intak  POA, likely secondary to above  Continue with supportive care and above treatment    Renal transplant recipient  Assessment & Plan  Patient is status post kidney and pancreatic transplant in 1998  Continue Prograf and steroids  Nephrology is following    Multiple myeloma not having achieved remission [facility])  Assessment & Plan  Patient follow with Dr Lott  Currently on Revlimid  Hold for now  Monitor    Anemia  Assessment & Plan  Patient with underlying chronic anemia  Monitor      Essential hypertension  Assessment & Plan  With elevated BP  Continue amlodipine, metoprolol, clonidine, doxazosin and hydralazine  Nephrology is following    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 [facility])  Assessment & Plan  Lab Results   Component Value Date    HGBA1C 5 1 09/09/2019       No results for input(s): POCGLU in the last 72 hours      Blood Sugar Average: Last 72 hrs:   Patient takes toujeo 1 unit qhs  Monitor    Acquired hypothyroidism  Assessment & Plan  Continue levothyroxine    VTE Prophylaxis: Heparin  / sequential compression device   Code Status: full code  POLST: There is no POLST form on file for this patient (pre-hospital)  Discussion with family: no    Anticipated Length of Stay:  Patient will be admitted on an Inpatient basis with an anticipated length of stay of  > 2 midnights  Justification for Hospital Stay:  Management of acute renal failure and metabolic acidosis    Total Time for Visit, including Counseling / Coordination of Care: 1 hour  Greater than 50% of this total time spent on direct patient counseling and coordination of care  Chief Complaint:     Generalized weakness and fatigue    History of Present Illness: Georgette Peng is a 53 y o  female who presents generalized weakness and fatigue  Patient with chronic kidney disease stage 4, status post kidney and pancreatic transplant in 1998, diabetes mellitus type 1, anemia, hypertension, multiple myeloma and depression was sent to the hospital by her  nephrologist due to progressive weakness,  fatigue, dysuria with urinary incontinence, low back pain at the kidney sites, watery diarrhea with dark stool associated with nausea,  no vomiting and decreasing oral intake  Patient had the symptoms for about 1 week, she reported chills but no fever  No hematochezia  She also reported mild confusion, mild abdominal pain and increasing pain at the transplant site  She has had multiple UTI and admission for the same before  She was informed by ID to take the oral Keflex on the first symptoms of suspected UTI    Patient was started on bicarb drip in the ER      Review of Systems:    Review of Systems   Constitutional: Positive for activity change, appetite change, chills and fatigue  Negative for fever  HENT: Negative for sore throat  Respiratory: Negative for cough and shortness of breath      Cardiovascular: Negative for chest pain, palpitations and leg swelling  Gastrointestinal: Positive for abdominal pain, diarrhea and nausea  Negative for blood in stool and vomiting  Genitourinary: Positive for dysuria and frequency  Musculoskeletal: Positive for back pain  Neurological: Positive for weakness  Negative for dizziness, speech difficulty and headaches  Psychiatric/Behavioral: Positive for confusion  All other systems reviewed and are negative        Past Medical and Surgical History:     Past Medical History:   Diagnosis Date    Abnormal liver function test     Acute kidney injury ([facility] [address] )     Acute on chronic congestive heart failure (HCC)     Allergic urticaria     Anemia     Cancer (HCC)     Multiple myeloma    Cervical dysplasia     Cholelithiasis     Chronic diastolic (congestive) heart failure ([facility] [address] ) 9/18/2017    Diabetes mellitus ([facility] )     Previous, controlled with diet    Diabetes mellitus with foot ulcer ([facility] [address] )     Disease of thyroid gland     Encephalopathy     Hematuria     + leak est- secondary to UTIs/panc drainage    History of transfusion     Hyperkalemia     Hypertension     Iliotibial band syndrome     Lumbar radiculopathy     Multiple myeloma (HCC)     Multiple myeloma ([facility] [address] )     Night blindness     Nonrheumatic aortic (valve) insufficiency     Pneumonia     Renal disorder     Retinopathy     Seborrhea     Seizure ([facility] [address] )     Shingles     Sinus tachycardia     B blocker - cardio echo stress test 02 normal/neg LE doppler 2/02 OK and 12/07    Status post simultaneous kidney and pancreas transplant ([facility] [address] )     Toe amputation status     Trochanteric bursitis        Past Surgical History:   Procedure Laterality Date    CATARACT EXTRACTION      CHOLECYSTECTOMY      COLONOSCOPY      two polyps in the rectum removed and biopsied diverticulosis in the sigmoid colon, external hemorrhoiods- Dr Barfield    COMBINED KIDNEY-PANCREAS TRANSPLANT N/A     CT BONE MARROW BIOPSY AND ASPIRATION  4/17/2019    CYSTOSCOPY N/A 10/13/2016    Procedure: CYSTOSCOPY, retrograde pyelogram, biopsy of ureteral polyp; Surgeon: Charli Hernandez MD;  Location: [facility];  Service:    [place] DILATION AND CURETTAGE OF UTERUS      ESOPHAGOGASTRODUODENOSCOPY N/A 11/20/2017    Procedure: ESOPHAGOGASTRODUODENOSCOPY (EGD); Surgeon: Grace Dodd DO;  Location: [facility]; Service: Gastroenterology    EYE SURGERY      cataracts    FOOT AMPUTATION THROUGH METATARSAL Left     FOOT SURGERY Right     excision of metatarsal heads    HALLUX VALGUS CORRECTION Right     NEPHRECTOMY TRANSPLANTED ORGAN      PANCREATIC TRANSPLANT REMOVAL  1998       Meds/Allergies:    Prior to Admission medications    Medication Sig Start Date End Date Taking?  Authorizing Provider   amLODIPine (NORVASC) 10 mg tablet TAKE 1 TABLET(10MG) BY MOUTH EVERY MORNING AND 1/2 TABLET(5MG) EVERY EVENING  Patient taking differently: Take 10 mg by mouth every morning TAKE 1 TABLET(10MG) BY MOUTH EVERY MORNING AND 1/2 TABLET(5MG) EVERY EVENING 4/8/19  Yes Tamela Bautista MD   ARIPiprazole (ABILIFY) 30 mg tablet Take 1 tablet (30 mg total) by mouth daily at bedtime for 180 days 6/5/19 12/2/19 Yes Sigifredo Parisi MD   aspirin 81 MG tablet Take 1 tablet by mouth daily 6/5/13  Yes Historical Provider, MD   busPIRone (BUSPAR) 5 mg tablet Take 1 tablet (5 mg total) by mouth 2 (two) times a day for 180 days 6/5/19 12/2/19 Yes Sigifredo Parisi MD   cephalexin (KEFLEX) 500 mg capsule Take 1 capsule (500 mg total) by mouth every 8 (eight) hours for 7 days 11/1/19 11/8/19 Yes Cinthya Mcgarry MD   Cholecalciferol (VITAMIN D3) 1000 units CAPS Take 3 capsules by mouth daily     Yes Historical Provider, MD   cloNIDine (CATAPRES) 0 1 mg tablet TAKE 3 TABLETS BY MOUTH EVERY MORNING, 3 TABLETS AT NOON, AND 3 TABLETS EVERY EVENING  Patient taking differently: 0 3 mg every 8 (eight) hours TAKE 3 TABLETS BY MOUTH EVERY MORNING, 3 TABLETS AT NOON, AND 3 TABLETS EVERY EVENING 5/26/19  Yes MER Valadez doxazosin (CARDURA) 4 mg tablet Take 1 tablet (4 mg total) by mouth daily at bedtime 9/13/19  Yes Maximino Good DO   DULoxetine (CYMBALTA) 60 mg delayed release capsule Take 1 capsule (60 mg total) by mouth 2 (two) times a day for 180 days 6/5/19 12/2/19 Yes Dwayne Nettles MD   ferrous sulfate 325 (65 Fe) mg tablet Take 1 tablet (325 mg total) by mouth daily with breakfast 9/14/19  Yes Maximino Good DO   folic acid (FOLVITE) 1 mg tablet TAKE 1 TABLET BY MOUTH DAILY AS DIRECTED 2/22/19  Yes Jailene Elaine MD   hydrALAZINE (APRESOLINE) 50 mg tablet TAKE 1 TABLET(50 MG TOTAL) BY MOUTH THREE TIMES DAILY FOR 90 DAYS 10/18/19  Yes George Merrill MD   Insulin Glargine (TOUJEO SOLOSTAR) 300 units/mL CONCETRATED U-300 injection pen Inject 1 Units under the skin daily at bedtime 1/10/19  Yes Mortimer Salk, MD   insulin lispro (HUMALOG KWIKPEN) 100 units/mL injection pen INJECT 10 UNDER THE SKIN EVERY DAY OR AS DIRECTED  Patient taking differently: INJECT 10 UNDER THE SKIN EVERY DAY OR AS DIRECTED 5/21/19  Yes Mortimer Salk, MD   Insulin Pen Needle (BD PEN NEEDLE VISHNU U/F) 32G X 4 MM MISC Use 4 times daily 8/23/18  Yes Mortimer Salk, MD   levothyroxine 125 mcg tablet Take 1 tablet (125 mcg total) by mouth daily 6/24/19  Yes Nya Lutz PA-C   metoprolol tartrate (LOPRESSOR) 50 mg tablet TAKE 1 TABLET(50 MG TOTAL) BY MOUTH TWICE DAILY 10/4/19  Yes George Merrill MD   pravastatin (PRAVACHOL) 80 mg tablet TAKE 1 TABLET BY MOUTH DAILY 1/29/18  Yes Jailene Elaine MD   predniSONE 5 mg tablet Take 1 tablet (5 mg total) by mouth daily 5/24/19  Yes MER Jacobsen   rOPINIRole (REQUIP) 0 25 mg tablet TAKE 1 TABLET BY MOUTH TWICE DAILY 8/18/19  Yes Jailene Elaine MD   sertraline (ZOLOFT) 100 mg tablet Take 2 tablets (200 mg total) by mouth daily for 180 days 6/5/19 12/2/19 Yes Dwayne Nettles MD   sevelamer carbonate (RENVELA) 800 mg tablet Take 1 tablet (800 mg total) by mouth 3 (three) times a day with meals 7/18/19  Yes Renu Regalado MD   sodium bicarbonate 650 mg tablet TAKE 2 TABLETS BY MOUTH THREE TIMES DAILY 5/28/19  Yes Renu Regalado MD   tacrolimus (PROGRAF) 1 mg capsule Take 3 mg in AM and 2 mg in PM (6/18/19)  Patient taking differently: Take 3 mg by mouth every 12 (twelve) hours  6/18/19  Yes Renu Regalado MD   Lancets [facility] ULTRASOFT) lancets by Does not apply route 4 (four) times a day   3/9/16   Historical Provider, MD   lenalidomide (REVLIMID) 5 MG CAPS Take 1 capsule (5 mg total) by mouth every other day 3 weeks on/ 1 week off  [facility] # [id number] 11/4/19 11/4/19   Yanet Alcantar MD   LORazepam (ATIVAN) 2 mg tablet Take 1 tablet (2 mg total) by mouth 3 (three) times a day as needed for anxiety for up to 120 days To be filled on or after 6/29/19 6/29/19 10/27/19  Emilia Benitez MD   ondansetron (ZOFRAN-ODT) 8 mg disintegrating tablet Take 1 tablet (8 mg total) by mouth every 8 (eight) hours as needed for nausea or vomiting  Patient not taking: Reported on 11/1/2019 10/21/19 11/5/19  Yanet Alcantar MD   ONE TOUCH ULTRA TEST test strip Use 4 times daily ( please dispense One touch Ultra test strips)  Patient not taking: Reported on 11/5/2019 7/9/19 11/5/19  Cristy Sharif MD     I have reviewed home medications with patient personally  Allergies: Allergies   Allergen Reactions    Ixazomib Rash     Ninlaro    Revlimid [Lenalidomide] Rash    Cefadroxil     Latex Rash    Morphine Other (See Comments)     Other reaction(s): Other (See Comments)  projectile vomiting    Morphine And Related GI Intolerance    Myrbetriq [Mirabegron] Hives    Penicillins Hives     Other reaction(s):  Other (See Comments)  Respiratory Distress,hives  Tolerates cefazolin       Social History:     Marital Status: Legally    Patient lives alone  Substance Use History:   Social History     Substance and Sexual Activity   Alcohol Use Never    Frequency: Never    Binge frequency: Never    Comment: (history)     Social History     Tobacco Use   Smoking Status Former Smoker    Last attempt to quit: 2012    Years since quittin 5   Smokeless Tobacco Never Used     Social History     Substance and Sexual Activity   Drug Use Never    Types: Hydrocodone    Comment: Past cocaine use many years ago - no current use       Family History:    non-contributory    Physical Exam:     Vitals:   Blood Pressure: 155/67 (19 1510)  Pulse: 61 (19 1510)  Temperature: 97 5 °F (36 4 °C) (19 1118)  Temp Source: Oral (19 1118)  Respirations: 18 (19 1510)  Weight - Scale: 99 8 kg (220 lb) (19 1116)  SpO2: 96 % (19 1510)    Physical Exam   Constitutional: She is oriented to person, place, and time  She appears well-developed  HENT:   Head: Normocephalic and atraumatic  Mouth/Throat: Oropharynx is clear and moist  No oropharyngeal exudate  Dry mucous membrane   Eyes: Conjunctivae and EOM are normal  No scleral icterus  Neck: Normal range of motion  Neck supple  No JVD present  Cardiovascular: Normal rate, regular rhythm, normal heart sounds and intact distal pulses  No murmur heard  Pulmonary/Chest: Effort normal and breath sounds normal  No respiratory distress  She has no wheezes  Abdominal: Soft  Bowel sounds are normal  She exhibits distension  There is tenderness (Mild generalized tenderness)  There is no rebound  Musculoskeletal: Normal range of motion  She exhibits no edema or tenderness  Neurological: She is alert and oriented to person, place, and time  No cranial nerve deficit  Skin: Skin is warm and dry  No rash noted  Additional Data:     Lab Results: I have personally reviewed pertinent reports        Results from last 7 days   Lab Units 19  1204   WBC Thousand/uL 5 94   HEMOGLOBIN g/dL 7 5*   HEMATOCRIT % 24 8*   PLATELETS Thousands/uL 173   NEUTROS PCT % 63   LYMPHS PCT % 19   MONOS PCT % 8 EOS PCT % 7*     Results from last 7 days   Lab Units 11/05/19  1204   SODIUM mmol/L 140   POTASSIUM mmol/L 4 5   CHLORIDE mmol/L 118*   CO2 mmol/L 15*   BUN mg/dL 50*   CREATININE mg/dL 2 86*   ANION GAP mmol/L 7   CALCIUM mg/dL 8 7   ALBUMIN g/dL 3 5   TOTAL BILIRUBIN mg/dL 0 36   ALK PHOS U/L 111   ALT U/L 18   AST U/L 10   GLUCOSE RANDOM mg/dL 111                       Imaging: I have personally reviewed pertinent reports  XR chest 2 views   Final Result by Manish Freed MD (11/05 1344)      Interval development of mild vascular congestion            Workstation performed: [id number]             EKG, Pathology, and Other Studies Reviewed on Admission:   · yes    Allscripts / Epic Records Reviewed: Yes     ** Please Note: This note has been constructed using a voice recognition system   **

## 2019-11-05 NOTE — ED PROVIDER NOTES
History  Chief Complaint   Patient presents with    Abnormal Lab     Pt reports having a kidney and pancreas transplant 20 years ago and believes her kidney is starting to fail  Pt reports h/o of leukemia, being tired, weak, "cannot think stright/cannot walk stright, cannot eat " Pt reports her doctor told her to come in if she was feeling worse  HPI   This is a 50-year-old woman who is status post distant renal transplant with CKD and recurrent UTIs as well as history of pancreas transplant  Patient's transplants were done over 20 years ago at [facility]  She is on tacrolimus  She also has multiple myeloma and is on Revlimid  Her kidney transplant is managed by Dr Mya Rivera from nephrology  She has CKD stage 4  Patient was told that she will likely need repeat kidney transplant but is not yet on the transplant list   Her creatinine has been rising and her bicarb has been dropping  She presents today because she has been feeling generally fatigued over the last several days  She says these symptoms are similar to what she often gets with a urinary tract infection, which she has a frequent history of  Patient follows with Dr Lauren Power from Infectious Disease for these  Given that she frequently develops sepsis from her UTIs she is currently prescribed Keflex to take at the 1st sign of UTI  Patient says that she started having dysuria and incontinence 2 days ago, at which time she started taking Keflex  She has not felt like her urinary symptoms have gotten worse, but she continues to have some suprapubic discomfort and burning  Since last night she feels like she is getting mildly confused and her speech is slow  She does have some chronic flank pain over the site of her old kidneys, but says this is not different than baseline  She has not been febrile  No nausea or vomiting  Prior to Admission Medications   Prescriptions Last Dose Informant Patient Reported? Taking?    ARIPiprazole (ABILIFY) 30 mg tablet 11/4/2019 at Unknown time Self No Yes   Sig: Take 1 tablet (30 mg total) by mouth daily at bedtime for 180 days   Cholecalciferol (VITAMIN D3) 1000 units CAPS 11/5/2019 at Unknown time Self Yes Yes   Sig: Take 3 capsules by mouth daily     DULoxetine (CYMBALTA) 60 mg delayed release capsule 11/5/2019 at Unknown time Self No Yes   Sig: Take 1 capsule (60 mg total) by mouth 2 (two) times a day for 180 days   Insulin Glargine (TOUJEO SOLOSTAR) 300 units/mL CONCETRATED U-300 injection pen 11/4/2019 at Unknown time Self No Yes   Sig: Inject 1 Units under the skin daily at bedtime   Insulin Pen Needle (BD PEN NEEDLE VISHNU U/F) 32G X 4 MM MISC 11/4/2019 at Unknown time Self No Yes   Sig: Use 4 times daily   LORazepam (ATIVAN) 2 mg tablet  Self No No   Sig: Take 1 tablet (2 mg total) by mouth 3 (three) times a day as needed for anxiety for up to 120 days To be filled on or after 6/29/19   Lancets (ONETOUCH ULTRASOFT) lancets Unknown at Unknown time Self Yes No   Sig: by Does not apply route 4 (four) times a day     amLODIPine (NORVASC) 10 mg tablet 11/5/2019 at Unknown time Self No Yes   Sig: TAKE 1 TABLET(10MG) BY MOUTH EVERY MORNING AND 1/2 TABLET(5MG) EVERY EVENING   Patient taking differently: Take 10 mg by mouth every morning TAKE 1 TABLET(10MG) BY MOUTH EVERY MORNING AND 1/2 TABLET(5MG) EVERY EVENING   aspirin 81 MG tablet 11/5/2019 at Unknown time Self Yes Yes   Sig: Take 1 tablet by mouth daily   busPIRone (BUSPAR) 5 mg tablet 11/5/2019 at Unknown time Self No Yes   Sig: Take 1 tablet (5 mg total) by mouth 2 (two) times a day for 180 days   cephalexin (KEFLEX) 500 mg capsule 11/5/2019 at Unknown time  No Yes   Sig: Take 1 capsule (500 mg total) by mouth every 8 (eight) hours for 7 days   cloNIDine (CATAPRES) 0 1 mg tablet 11/5/2019 at Unknown time Self No Yes   Sig: TAKE 3 TABLETS BY MOUTH EVERY MORNING, 3 TABLETS AT NOON, AND 3 TABLETS EVERY EVENING   Patient taking differently: 0 3 mg every 8 (eight) hours TAKE 3 TABLETS BY MOUTH EVERY MORNING, 3 TABLETS AT NOON, AND 3 TABLETS EVERY EVENING   doxazosin (CARDURA) 4 mg tablet 11/4/2019 at Unknown time Self No Yes   Sig: Take 1 tablet (4 mg total) by mouth daily at bedtime   ferrous sulfate 325 (65 Fe) mg tablet 11/5/2019 at Unknown time Self No Yes   Sig: Take 1 tablet (325 mg total) by mouth daily with breakfast   folic acid (FOLVITE) 1 mg tablet 11/5/2019 at Unknown time Self No Yes   Sig: TAKE 1 TABLET BY MOUTH DAILY AS DIRECTED   hydrALAZINE (APRESOLINE) 50 mg tablet 11/5/2019 at Unknown time  No Yes   Sig: TAKE 1 TABLET(50 MG TOTAL) BY MOUTH THREE TIMES DAILY FOR 90 DAYS   insulin lispro (HUMALOG KWIKPEN) 100 units/mL injection pen 11/4/2019 at Unknown time Self No Yes   Sig: INJECT 10 UNDER THE SKIN EVERY DAY OR AS DIRECTED   Patient taking differently: INJECT 10 UNDER THE SKIN EVERY DAY OR AS DIRECTED   lenalidomide (REVLIMID) 5 MG CAPS Unknown at Unknown time  No No   Sig: Take 1 capsule (5 mg total) by mouth every other day 3 weeks on/ 1 week off    [facility] # [id number] 11/4/19   levothyroxine 125 mcg tablet 11/5/2019 at Unknown time Self No Yes   Sig: Take 1 tablet (125 mcg total) by mouth daily   metoprolol tartrate (LOPRESSOR) 50 mg tablet 11/5/2019 at Unknown time Self No Yes   Sig: TAKE 1 TABLET(50 MG TOTAL) BY MOUTH TWICE DAILY   pravastatin (PRAVACHOL) 80 mg tablet 11/4/2019 at Unknown time Self No Yes   Sig: TAKE 1 TABLET BY MOUTH DAILY   predniSONE 5 mg tablet 11/5/2019 at Unknown time Self No Yes   Sig: Take 1 tablet (5 mg total) by mouth daily   rOPINIRole (REQUIP) 0 25 mg tablet 11/5/2019 at Unknown time Self No Yes   Sig: TAKE 1 TABLET BY MOUTH TWICE DAILY   sertraline (ZOLOFT) 100 mg tablet 11/5/2019 at Unknown time Self No Yes   Sig: Take 2 tablets (200 mg total) by mouth daily for 180 days   sevelamer carbonate (RENVELA) 800 mg tablet 11/5/2019 at Unknown time Self No Yes   Sig: Take 1 tablet (800 mg total) by mouth 3 (three) times a day with meals   sodium bicarbonate 650 mg tablet 11/5/2019 at Unknown time Self No Yes   Sig: TAKE 2 TABLETS BY MOUTH THREE TIMES DAILY   tacrolimus (PROGRAF) 1 mg capsule 11/4/2019 at Unknown time Self No Yes   Sig: Take 3 mg in AM and 2 mg in PM (6/18/19)   Patient taking differently: Take 3 mg by mouth every 12 (twelve) hours       Facility-Administered Medications: None       Past Medical History:   Diagnosis Date    Abnormal liver function test     [facility] [address] [facility]     Acute on chronic congestive heart failure (HCC)     Allergic urticaria     Anemia     Cancer (HCC)     Multiple myeloma    Cervical dysplasia     Cholelithiasis     Chronic diastolic (congestive) heart failure ([address] ) 9/18/2017    Diabetes mellitus (HCC)     Previous, controlled with diet    Diabetes mellitus with foot ulcer ([address] )     Disease of thyroid gland     Encephalopathy     Hematuria     + leak est- secondary to UTIs/panc drainage    History of transfusion     Hyperkalemia     Hypertension     Iliotibial band syndrome     Lumbar radiculopathy     Multiple myeloma (HCC)     Multiple myeloma ([address] )     Night blindness     Nonrheumatic aortic (valve) insufficiency     Pneumonia     Renal disorder     Retinopathy     Seborrhea     Seizure ([address] )     Shingles     Sinus tachycardia     B blocker - cardio echo stress test 02 normal/neg LE doppler 2/02 OK and 12/07    Status post simultaneous kidney and pancreas transplant ([facility] [address] )     Toe amputation status     Trochanteric bursitis        Past Surgical History:   Procedure Laterality Date    CATARACT EXTRACTION      CHOLECYSTECTOMY      COLONOSCOPY      two polyps in the rectum removed and biopsied diverticulosis in the sigmoid colon, external hemorrhoiods- Dr Barfield    COMBINED KIDNEY-PANCREAS TRANSPLANT N/A     CT BONE MARROW BIOPSY AND ASPIRATION  4/17/2019    CYSTOSCOPY N/A 10/13/2016    Procedure: CYSTOSCOPY, retrograde pyelogram, biopsy of ureteral polyp;  Surgeon: Addi Cardenas MD;  Location: [facility];  Service:     DILATION AND CURETTAGE OF UTERUS      ESOPHAGOGASTRODUODENOSCOPY N/A 2017    Procedure: ESOPHAGOGASTRODUODENOSCOPY (EGD); Surgeon: Saida Mancera DO;  Location: [facility]; Service: Gastroenterology    EYE SURGERY      cataracts    FOOT AMPUTATION THROUGH METATARSAL Left     FOOT SURGERY Right     excision of metatarsal heads    HALLUX VALGUS CORRECTION Right     NEPHRECTOMY TRANSPLANTED ORGAN      PANCREATIC TRANSPLANT REMOVAL         Family History   Problem Relation Age of Onset    Hypertension Mother     Cancer Mother     Hypertension Father     Cancer Father     Cancer Maternal Grandfather     Cancer Paternal Grandmother     Cancer Paternal Grandfather     Depression Sister     Breast cancer Maternal Grandmother 77     I have reviewed and agree with the history as documented  Social History     Tobacco Use    Smoking status: Former Smoker     Last attempt to quit: 2012     Years since quittin 5    Smokeless tobacco: Never Used   Substance Use Topics    Alcohol use: Never     Frequency: Never     Binge frequency: Never     Comment: (history)    Drug use: Never     Types: Hydrocodone     Comment: Past cocaine use many years ago - no current use        Review of Systems   Constitutional: Positive for fatigue  Negative for chills and fever  Respiratory: Negative for cough and shortness of breath  Cardiovascular: Negative for chest pain  Gastrointestinal: Negative for abdominal pain, nausea and vomiting  Genitourinary: Positive for decreased urine volume, dysuria, flank pain and pelvic pain  Negative for vaginal bleeding and vaginal discharge  Allergic/Immunologic: Positive for immunocompromised state  All other systems reviewed and are negative        Physical Exam  ED Triage Vitals   Temperature Pulse Respirations Blood Pressure SpO2   19 1118 19 1118 19 1118 11/05/19 1118 11/05/19 1118   97 5 °F (36 4 °C) 66 16 163/71 98 %      Temp Source Heart Rate Source Patient Position - Orthostatic VS BP Location FiO2 (%)   11/05/19 1118 11/05/19 1118 11/05/19 1118 11/05/19 1145 --   Oral Monitor Sitting Right arm       Pain Score       11/05/19 1118       5             Orthostatic Vital Signs  Vitals:    11/05/19 1510 11/05/19 1515 11/05/19 1545 11/05/19 1616   BP: 155/67 (!) 182/74 170/75 167/55   Pulse: 61 60 60 61   Patient Position - Orthostatic VS: Sitting Lying Lying Sitting       Physical Exam   Constitutional: She is oriented to person, place, and time  No distress  Chronically ill-appearing  HENT:   Head: Normocephalic and atraumatic  Eyes: Pupils are equal, round, and reactive to light  Conjunctivae and EOM are normal  No scleral icterus  Neck: Normal range of motion  Neck supple  Cardiovascular: Normal rate and regular rhythm  Exam reveals no gallop and no friction rub  No murmur heard  Pulmonary/Chest: Breath sounds normal  She has no wheezes  She has no rales  Abdominal: Soft  She exhibits no distension  There is no tenderness  There is no rebound and no guarding  Suprapubic discomfort to palpation  Genitourinary:   Genitourinary Comments: Mild bilateral flank tenderness to percussion  Musculoskeletal: Normal range of motion  She exhibits no edema or tenderness  Neurological: She is alert and oriented to person, place, and time  No cranial nerve deficit or sensory deficit  She exhibits normal muscle tone  Speech is slow but clear and fluent  No dysarthria  No facial droop  Normal strength and sensation in extremities x4  Skin: Skin is warm and dry  She is not diaphoretic  No erythema  No pallor  Psychiatric: She has a normal mood and affect  Her behavior is normal    Nursing note and vitals reviewed        ED Medications  Medications   sodium bicarbonate 150 mEq in dextrose 5 % 1,000 mL infusion (100 mL/hr Intravenous New Bag 11/5/19 1440)   amLODIPine (NORVASC) tablet 10 mg (has no administration in time range)   ARIPiprazole (ABILIFY) tablet 30 mg (has no administration in time range)   aspirin chewable tablet 81 mg (has no administration in time range)   busPIRone (BUSPAR) tablet 5 mg (5 mg Oral Given 11/5/19 1808)   cholecalciferol (VITAMIN D3) tablet 3,000 Units (has no administration in time range)   cloNIDine (CATAPRES) tablet 0 3 mg (has no administration in time range)   doxazosin (CARDURA) tablet 4 mg (has no administration in time range)   DULoxetine (CYMBALTA) delayed release capsule 60 mg (has no administration in time range)   ferrous sulfate tablet 325 mg (has no administration in time range)   folic acid (FOLVITE) tablet 1,000 mcg (has no administration in time range)   hydrALAZINE (APRESOLINE) tablet 50 mg (50 mg Oral Given 11/5/19 1808)   levothyroxine tablet 125 mcg (has no administration in time range)   metoprolol tartrate (LOPRESSOR) tablet 50 mg (has no administration in time range)   pravastatin (PRAVACHOL) tablet 80 mg (80 mg Oral Given 11/5/19 1808)   predniSONE tablet 5 mg (has no administration in time range)   rOPINIRole (REQUIP) tablet 0 25 mg (has no administration in time range)   sertraline (ZOLOFT) tablet 200 mg (has no administration in time range)   sevelamer (RENAGEL) tablet 800 mg (800 mg Oral Given 11/5/19 1859)   sodium bicarbonate tablet 1,300 mg (1,300 mg Oral Given 11/5/19 1807)   tacrolimus (PROGRAF) capsule 3 mg (has no administration in time range)   ondansetron (ZOFRAN) injection 4 mg (has no administration in time range)   heparin (porcine) subcutaneous injection 5,000 Units (5,000 Units Subcutaneous Given 11/5/19 1806)   insulin lispro (HumaLOG) 100 units/mL subcutaneous injection 1-5 Units (1 Units Subcutaneous Not Given 11/5/19 1816)   insulin lispro (HumaLOG) 100 units/mL subcutaneous injection 1-5 Units (has no administration in time range)   insulin glargine (LANTUS) subcutaneous injection 5 Units 0 05 mL (has no administration in time range)   LORazepam (ATIVAN) tablet 1 mg (has no administration in time range)   cefTRIAXone (ROCEPHIN) 1,000 mg in dextrose 5 % 50 mL IVPB (1,000 mg Intravenous New Bag 11/5/19 1808)   cefepime (MAXIPIME) 2 g/50 mL dextrose IVPB (0 mg Intravenous Stopped 11/5/19 1640)       Diagnostic Studies  Results Reviewed     Procedure Component Value Units Date/Time    Urine culture [[id number]] Collected:  11/05/19 1515    Lab Status:   In process Specimen:  Urine, Other Updated:  11/05/19 1559    Urine Microscopic [[id number]]  (Abnormal) Collected:  11/05/19 1411    Lab Status:  Final result Specimen:  Urine, Clean Catch Updated:  11/05/19 1425     RBC, UA None Seen /hpf      WBC, UA 4-10 /hpf      Epithelial Cells None Seen /hpf      Bacteria, UA None Seen /hpf      Hyaline Casts, UA 10-25 /lpf     POCT urinalysis dipstick [[id number]]  (Normal) Resulted:  11/05/19 1412    Lab Status:  Final result Updated:  11/05/19 1413     Color, UA see results    POCT pregnancy, urine [[id number]]  (Normal) Resulted:  11/05/19 1412    Lab Status:  Final result Updated:  11/05/19 1412     EXT PREG TEST UR (Ref: Negative) negative     Control valid    ED Urine Macroscopic [[id number]]  (Abnormal) Collected:  11/05/19 1411    Lab Status:  Final result Specimen:  Urine Updated:  11/05/19 1411     Color, UA Yellow     Clarity, UA Clear     pH, UA 7 5     Leukocytes, UA Large     Nitrite, UA Negative     Protein, UA 30 (1+) mg/dl      Glucose, UA Negative mg/dl      Ketones, UA Negative mg/dl      Urobilinogen, UA 0 2 E U /dl      Bilirubin, UA Negative     Blood, UA Negative     Specific Gravity, UA 1 015    Narrative:       CLINITEK RESULT    Comprehensive metabolic panel [[id number]]  (Abnormal) Collected:  11/05/19 1204    Lab Status:  Final result Specimen:  Blood from Arm, Right Updated:  11/05/19 1241     Sodium 140 mmol/L      Potassium 4 5 mmol/L      Chloride 118 mmol/L      CO2 15 mmol/L ANION GAP 7 mmol/L      BUN 50 mg/dL      Creatinine 2 86 mg/dL      Glucose 111 mg/dL      Calcium 8 7 mg/dL      AST 10 U/L      ALT 18 U/L      Alkaline Phosphatase 111 U/L      Total Protein 8 1 g/dL      Albumin 3 5 g/dL      Total Bilirubin 0 36 mg/dL      eGFR 18 ml/min/1 73sq m     Narrative:       National Kidney Disease Foundation guidelines for Chronic Kidney Disease (CKD):     Stage 1 with normal or high GFR (GFR > 90 mL/min/1 73 square meters)    Stage 2 Mild CKD (GFR = 60-89 mL/min/1 73 square meters)    Stage 3A Moderate CKD (GFR = 45-59 mL/min/1 73 square meters)    Stage 3B Moderate CKD (GFR = 30-44 mL/min/1 73 square meters)    Stage 4 Severe CKD (GFR = 15-29 mL/min/1 73 square meters)    Stage 5 End Stage CKD (GFR <15 mL/min/1 73 square meters)  Note: GFR calculation is accurate only with a steady state creatinine    Phosphorus [[id number]]  (Normal) Collected:  11/05/19 1204    Lab Status:  Final result Specimen:  Blood from Arm, Right Updated:  11/05/19 1241     Phosphorus 3 8 mg/dL     CBC and differential [[id number]]  (Abnormal) Collected:  11/05/19 1204    Lab Status:  Final result Specimen:  Blood from Arm, Right Updated:  11/05/19 1232     WBC 5 94 Thousand/uL      RBC 2 47 Million/uL      Hemoglobin 7 5 g/dL      Hematocrit 24 8 %       fL      MCH 30 4 pg      MCHC 30 2 g/dL      RDW 18 7 %      MPV 12 8 fL      Platelets 145 Thousands/uL      nRBC 0 /100 WBCs      Neutrophils Relative 63 %      Immat GRANS % 2 %      Lymphocytes Relative 19 %      Monocytes Relative 8 %      Eosinophils Relative 7 %      Basophils Relative 1 %      Neutrophils Absolute 3 75 Thousands/µL      Immature Grans Absolute 0 12 Thousand/uL      Lymphocytes Absolute 1 12 Thousands/µL      Monocytes Absolute 0 47 Thousand/µL      Eosinophils Absolute 0 41 Thousand/µL      Basophils Absolute 0 07 Thousands/µL                  XR chest 2 views   Final Result by Lamar Carrera MD (11/05 1344) Interval development of mild vascular congestion            Workstation performed: [id number]         US transplant kidney with doppler    (Results Pending)   CT abdomen pelvis wo contrast    (Results Pending)         Procedures  Procedures        ED Course  ED Course as of Nov 05 1901 Tue Nov 05, 2019   1255 uptrending   Creatinine(!): 2 86   1256 Potassium: 4 5   1302 downtrending   CO2(!): 15   1412 Leukocytes, UA(!): Large         MDM  Number of Diagnoses or Management Options  Chronic kidney disease: established and worsening  Low bicarbonate: established and worsening  Renal transplant disorder: established and worsening  UTI (urinary tract infection): established and worsening     Amount and/or Complexity of Data Reviewed  Clinical lab tests: ordered and reviewed  Tests in the radiology section of CPT®: ordered and reviewed  Tests in the medicine section of CPT®: ordered and reviewed  Decide to obtain previous medical records or to obtain history from someone other than the patient: yes  Obtain history from someone other than the patient: yes  Review and summarize past medical records: yes  Discuss the patient with other providers: yes  Independent visualization of images, tracings, or specimens: yes    Patient Progress  Patient progress: stable     58-year-old woman with history of renal transplant now with worsening chronic kidney disease presenting for generalized fatigue and urinary tract infection  Discussed with patient's nephrologist Dr Isa Crenshaw  Given that patient has slightly up trending creatinine and downtrending bicarbonate he would like her admitted to the hospital for bicarb drip and further evaluation by hospital nephrologist   There is no acute indication for dialysis at this time    Regarding patient's urinary tract infection, given that she continues to have dysuria, suprapubic pain, leukocyte esterase positive urine will escalate treatment from Keflex to IV cefepime since she is immunocompromised and has a history of urosepsis  Discussed this plan with ID physician Dr Petey Rodriguez  She is not febrile or toxic appearing at this time  Likely components of both her deteriorating renal function and urinary tract infection are driving her current symptomatology  She will require admission to the hospital for further management of both of these problems  Discussed with Dr Vic Bruno  Disposition  Final diagnoses:   Renal transplant disorder   UTI (urinary tract infection)   Low bicarbonate   Chronic kidney disease     Time reflects when diagnosis was documented in both MDM as applicable and the Disposition within this note     Time User Action Codes Description Comment    11/5/2019  3:07 PM Judit Beer Add [T86 10] Renal transplant disorder     11/5/2019  3:07 PM Judit Beer Add [N39 0] UTI (urinary tract infection)     11/5/2019  3:07 PM Judit Beer Add [E87 8] Low bicarbonate     11/5/2019  3:07 PM Judit Beer Add [N18 9] Chronic kidney disease       ED Disposition     ED Disposition Condition Date/Time Comment    Admit Nabeel Rivera Nov 5, 2019  3:06 PM Case was discussed with SLIM, and the patient's admission status was agreed to be Admission Status: inpatient status to the service of Dr Vic Bruno          Follow-up Information    None         Current Discharge Medication List      CONTINUE these medications which have NOT CHANGED    Details   amLODIPine (NORVASC) 10 mg tablet TAKE 1 TABLET(10MG) BY MOUTH EVERY MORNING AND 1/2 TABLET(5MG) EVERY EVENING  Qty: 135 tablet, Refills: 0    Comments: **Patient requests 90 days supply**  Associated Diagnoses: Benign essential HTN      ARIPiprazole (ABILIFY) 30 mg tablet Take 1 tablet (30 mg total) by mouth daily at bedtime for 180 days  Qty: 90 tablet, Refills: 1    Associated Diagnoses: Major depressive disorder, recurrent episode, in full remission (HCC)      aspirin 81 MG tablet Take 1 tablet by mouth daily      busPIRone (BUSPAR) 5 mg tablet Take 1 tablet (5 mg total) by mouth 2 (two) times a day for 180 days  Qty: 180 tablet, Refills: 1    Associated Diagnoses: FELIPE (generalized anxiety disorder)      cephalexin (KEFLEX) 500 mg capsule Take 1 capsule (500 mg total) by mouth every 8 (eight) hours for 7 days  Qty: 21 capsule, Refills: 0    Associated Diagnoses: Recurrent UTI      Cholecalciferol (VITAMIN D3) 1000 units CAPS Take 3 capsules by mouth daily        cloNIDine (CATAPRES) 0 1 mg tablet TAKE 3 TABLETS BY MOUTH EVERY MORNING, 3 TABLETS AT NOON, AND 3 TABLETS EVERY EVENING  Qty: 810 tablet, Refills: 4    Comments: **Patient requests 90 days supply**  Associated Diagnoses: Benign essential HTN      doxazosin (CARDURA) 4 mg tablet Take 1 tablet (4 mg total) by mouth daily at bedtime  Qty: 30 tablet, Refills: 0    Associated Diagnoses: Essential hypertension      DULoxetine (CYMBALTA) 60 mg delayed release capsule Take 1 capsule (60 mg total) by mouth 2 (two) times a day for 180 days  Qty: 180 capsule, Refills: 1    Associated Diagnoses: FELIPE (generalized anxiety disorder);  Major depressive disorder, recurrent episode, in full remission (HCC)      ferrous sulfate 325 (65 Fe) mg tablet Take 1 tablet (325 mg total) by mouth daily with breakfast  Qty: 30 tablet, Refills: 0    Associated Diagnoses: Chronic kidney disease, stage 4 (severe) (HCC)      folic acid (FOLVITE) 1 mg tablet TAKE 1 TABLET BY MOUTH DAILY AS DIRECTED  Qty: 90 tablet, Refills: 3    Associated Diagnoses: Mixed hyperlipidemia      hydrALAZINE (APRESOLINE) 50 mg tablet TAKE 1 TABLET(50 MG TOTAL) BY MOUTH THREE TIMES DAILY FOR 90 DAYS  Qty: 270 tablet, Refills: 0    Associated Diagnoses: Essential hypertension      Insulin Glargine (TOUJEO SOLOSTAR) 300 units/mL CONCETRATED U-300 injection pen Inject 1 Units under the skin daily at bedtime  Qty: 4 pen, Refills: 0    Associated Diagnoses: Uncontrolled type 2 diabetes mellitus with hyperglycemia (HCC)      insulin lispro (HUMALOG KWIKPEN) 100 units/mL injection pen INJECT 10 UNDER THE SKIN EVERY DAY OR AS DIRECTED  Qty: 45 mL, Refills: 1    Comments: **Patient requests 90 days supply**  Associated Diagnoses: Type 1 diabetes mellitus with hyperglycemia ([facility])      Insulin Pen Needle (BD PEN NEEDLE VISHNU U/F) 32G X 4 MM MISC Use 4 times daily  Qty: 400 each, Refills: 1    Associated Diagnoses: Type 2 diabetes mellitus with hyperglycemia, with long-term current use of insulin ([facility])      levothyroxine 125 mcg tablet Take 1 tablet (125 mcg total) by mouth daily  Qty: 90 tablet, Refills: 2    Comments: **Patient requests 90 days supply**  Associated Diagnoses: Type 1 diabetes mellitus with diabetic polyneuropathy ([place] [id number] ); Acquired hypothyroidism      metoprolol tartrate (LOPRESSOR) 50 mg tablet TAKE 1 TABLET(50 MG TOTAL) BY MOUTH TWICE DAILY  Qty: 180 tablet, Refills: 0    Associated Diagnoses: Benign essential HTN      pravastatin (PRAVACHOL) 80 mg tablet TAKE 1 TABLET BY MOUTH DAILY  Qty: 90 tablet, Refills: 5    Comments: **Patient requests 90 days supply**  Associated Diagnoses: Mixed hyperlipidemia      predniSONE 5 mg tablet Take 1 tablet (5 mg total) by mouth daily  Qty: 90 tablet, Refills: 3    Associated Diagnoses: Benign essential HTN      rOPINIRole (REQUIP) 0 25 mg tablet TAKE 1 TABLET BY MOUTH TWICE DAILY  Qty: 180 tablet, Refills: 0    Associated Diagnoses: RLS (restless legs syndrome)      sertraline (ZOLOFT) 100 mg tablet Take 2 tablets (200 mg total) by mouth daily for 180 days  Qty: 180 tablet, Refills: 1    Associated Diagnoses: FELIPE (generalized anxiety disorder);  Major depressive disorder, recurrent episode, in full remission ([facility] )      sevelamer carbonate (RENVELA) 800 mg tablet Take 1 tablet (800 mg total) by mouth 3 (three) times a day with meals  Qty: 270 tablet, Refills: 3    Associated Diagnoses: Hyperphosphatemia      sodium bicarbonate 650 mg tablet TAKE 2 TABLETS BY MOUTH THREE TIMES DAILY  Qty: 180 tablet, Refills: 0    Associated Diagnoses: Acidemia      tacrolimus (PROGRAF) 1 mg capsule Take 3 mg in AM and 2 mg in PM (6/18/19)  Qty: 180 capsule, Refills: 6    Associated Diagnoses: Renal transplant recipient      Lancets (ONETOUCH ULTRASOFT) lancets by Does not apply route 4 (four) times a day        lenalidomide (REVLIMID) 5 MG CAPS Take 1 capsule (5 mg total) by mouth every other day 3 weeks on/ 1 week off  auth # [id number] 11/4/19  Qty: 11 capsule, Refills: 0    Associated Diagnoses: Multiple myeloma not having achieved remission (HCC)      LORazepam (ATIVAN) 2 mg tablet Take 1 tablet (2 mg total) by mouth 3 (three) times a day as needed for anxiety for up to 120 days To be filled on or after 6/29/19  Qty: 90 tablet, Refills: 3    Associated Diagnoses: FELIPE (generalized anxiety disorder)           No discharge procedures on file  ED Provider  Attending physically available and evaluated Jo Villanueva I managed the patient along with the ED Attending      Electronically Signed by         Jane Hinojosa MD  11/05/19 6692

## 2019-11-05 NOTE — TELEPHONE ENCOUNTER
I did just try contact Olga Gustafson but was unsuccessful  A message has been left on her answering machine requesting that she contact the office

## 2019-11-05 NOTE — TELEPHONE ENCOUNTER
Vijay Devonte contacted the office stating she was feeling unwell  Patient speech was slurred and she seemed confused  I did instruct patient to report to the nearest emergency room for evaluation  She stated she was not in a position to drive and would be asking her father vs her mother as they had must gotten into an argument over her needing to take her to the hospital  I did make Dr Stephanie Corona aware and he has asked me to contact the pt shortly to see if she was able to find a ride

## 2019-11-05 NOTE — ASSESSMENT & PLAN NOTE
Lab Results   Component Value Date    HGBA1C 5 1 09/09/2019       No results for input(s): POCGLU in the last 72 hours      Blood Sugar Average: Last 72 hrs:   Patient takes toujeo 1 unit qhs  Monitor

## 2019-11-05 NOTE — ED NOTES
Patient provided a cup of water  Dr Imani Dennis made aware        Moriah Sue, MICHELLE  11/05/19 2340

## 2019-11-05 NOTE — CONSULTS
Consultation - Nephrology   Domitila Kennedy 53 y o  female MRN: [id number]  Unit/Bed#: ED 11 Encounter: [id number]    ASSESSMENT/PLAN:   1  CKD IV: baseline creatinine mid 2's after significant EDUARDO in September with creatinine up to 4  · Creatinine 2 8 today which seems to be on the higher end of her baseline  · UA: large leuks, +1 protein, 4-10 WBC, 10-25 hyaline casts  · UPC ratio 2 11   · Trend renal function on IVF  · Check am BMP   2  S/p renal and pancreas transplant in 1998: transplant failed in 1997 and also now with worsening renal allograft function   · Immunosuppression: prograf 3mg am and 2mg pm, prednisone 5mg daily   · Goal prograf 3-4 and last level 4 on 11/4  3  Non-anion gap Metabolic acidosis: bicarb down to 15 likely due to CKD and diarrhea yesterday   · Start bicarb drip   4  Anemia of CKD: hgb low but needs heme clearance for LEO due to myeloma  Avoid transfusion as patient interested in another transplant although may not be candidate with myeloma history   5  Frequent UTI: was taking Keflex since Sunday  Follows with ID   · Urine culture pending   · Given cefepime x1 in the ER    6  Multiple myeloma: previously on revlimid but now on hold   7  Hypertension: uncontrolled   · Currently on amlodipine 10mg am and 5mg pm, clonidine 0 3mg tid, cardura 4mg daily, hydralazine 50mg tid, metoprolol 50mg bid   · Monitor BP for now and if remains elevated will increase cardura  · Recent transplant ultrasound with no stenosis of transplant renal artery     HISTORY OF PRESENT ILLNESS:  Requesting Physician: Talon Velázquez MD  Reason for Consult: CKD IV    Domitila Kennedy is a 53y o  year old female who was admitted to [facility] with multiple complaints  She has a history of renal/pancreas transplant in 1998, frequent UTI's and MGUS with concern for myeloma  She hasn't been feeling well recently and reports fatigue, weakness, dysuria and incontinence   She also had diarrhea last night and this morning  She gets frequent resistant UTI's and had Keflex at home to take if she develops UTI symptoms so she started a course on Sunday but continued to feel worse  She called our office today and was instructed to come to the ER for the above complaints  She denies any recent chest pain, shortness of breath, nausea, vomiting or edema         PAST MEDICAL HISTORY:  Past Medical History:   Diagnosis Date    Abnormal liver function test     Acute kidney injury ([facility] )     Acute on chronic congestive heart failure (HCC)     Allergic urticaria     Anemia     Cancer (HCC)     Multiple myeloma    Cervical dysplasia     Cholelithiasis     Chronic diastolic (congestive) heart failure ([facility] ) 9/18/2017    Diabetes mellitus ([facility] )     Previous, controlled with diet    Diabetes mellitus with foot ulcer ([address] )     Disease of thyroid gland     Encephalopathy     Hematuria     + leak est- secondary to UTIs/panc drainage    History of transfusion     Hyperkalemia     Hypertension     Iliotibial band syndrome     Lumbar radiculopathy     Multiple myeloma (HCC)     Multiple myeloma ([address] )     Night blindness     Nonrheumatic aortic (valve) insufficiency     Pneumonia     Renal disorder     Retinopathy     Seborrhea     Seizure ([address] )     Shingles     Sinus tachycardia     B blocker - cardio echo stress test 02 normal/neg LE doppler 2/02 OK and 12/07    Status post simultaneous kidney and pancreas transplant ([facility] )     Toe amputation status     Trochanteric bursitis        PAST SURGICAL HISTORY:  Past Surgical History:   Procedure Laterality Date    CATARACT EXTRACTION      CHOLECYSTECTOMY      COLONOSCOPY      two polyps in the rectum removed and biopsied diverticulosis in the sigmoid colon, external hemorrhoiods- Dr Barfield    COMBINED KIDNEY-PANCREAS TRANSPLANT N/A     CT BONE MARROW BIOPSY AND ASPIRATION  4/17/2019    CYSTOSCOPY N/A 10/13/2016    Procedure: CYSTOSCOPY, retrograde pyelogram, biopsy of ureteral polyp; Surgeon: Kai Weber MD;  Location: [facility];  Service:    [place] DILATION AND CURETTAGE OF UTERUS      ESOPHAGOGASTRODUODENOSCOPY N/A 2017    Procedure: ESOPHAGOGASTRODUODENOSCOPY (EGD); Surgeon: Atiya Shannon DO;  Location: [facility]; Service: Gastroenterology    EYE SURGERY      cataracts    FOOT AMPUTATION THROUGH METATARSAL Left     FOOT SURGERY Right     excision of metatarsal heads    HALLUX VALGUS CORRECTION Right     NEPHRECTOMY TRANSPLANTED ORGAN      PANCREATIC TRANSPLANT REMOVAL         ALLERGIES:  Allergies   Allergen Reactions    Ixazomib Rash     Ninlaro    Revlimid [Lenalidomide] Rash    Cefadroxil     Latex Rash    Morphine Other (See Comments)     Other reaction(s): Other (See Comments)  projectile vomiting    Morphine And Related GI Intolerance    Myrbetriq [Mirabegron] Hives    Penicillins Hives     Other reaction(s):  Other (See Comments)  Respiratory Distress,hives  Tolerates cefazolin       SOCIAL HISTORY:  Social History     Substance and Sexual Activity   Alcohol Use Never    Frequency: Never    Binge frequency: Never    Comment: (history)     Social History     Substance and Sexual Activity   Drug Use Never    Types: Hydrocodone    Comment: Past cocaine use many years ago - no current use     Social History     Tobacco Use   Smoking Status Former Smoker    Last attempt to quit: 2012    Years since quittin 5   Smokeless Tobacco Never Used       FAMILY HISTORY:  Family History   Problem Relation Age of Onset    Hypertension Mother     Cancer Mother     Hypertension Father     Cancer Father     Cancer Maternal Grandfather     Cancer Paternal Grandmother     Cancer Paternal Grandfather     Depression Sister     Breast cancer Maternal Grandmother 77       MEDICATIONS:  Scheduled Meds:  Current Facility-Administered Medications:  sodium bicarbonate infusion 100 mL/hr Intravenous Continuous Steven Barry MD Last Rate: 100 mL/hr (11/05/19 1440)       PRN Meds:     Continuous Infusions:  sodium bicarbonate infusion 100 mL/hr Last Rate: 100 mL/hr (11/05/19 1440)       REVIEW OF SYSTEMS:  A complete review of systems was done  Pertinent positives and negatives noted in the HPI but otherwise the review of systems is negative      PHYSICAL EXAM:  Current Weight: Weight - Scale: 99 8 kg (220 lb)  First Weight: Weight - Scale: 99 8 kg (220 lb)  Vitals:    11/05/19 1430   BP: (!) 175/72   Pulse: 60   Resp: 18   Temp:    SpO2: 97%     General: no acute distress   Skin: no rash   Eyes: sclera anicteric   ENT: moist mucous membranes   Neck: supple, symmetric   Chest: clear to auscultation    CVS: regular rate and rhythm  Abdomen: soft, LLQ tenderness   Extremities: no edema    Neuro: awake and alert  Psych: appropriate affect    Lab Results:   Results from last 7 days   Lab Units 11/05/19  1204 11/04/19  0713 10/30/19  0834   WBC Thousand/uL 5 94  --  5 68   HEMOGLOBIN g/dL 7 5*  --  7 9*   HEMATOCRIT % 24 8*  --  25 6*   PLATELETS Thousands/uL 173  --  179   SODIUM mmol/L 140 139 141   POTASSIUM mmol/L 4 5 4 4 4 7   CHLORIDE mmol/L 118* 116* 114*   CO2 mmol/L 15* 16* 18*   BUN mg/dL 50* 47* 47*   CREATININE mg/dL 2 86* 2 76* 2 53*   CALCIUM mg/dL 8 7 8 5 8 8   MAGNESIUM mg/dL  --  2 5 2 0   PHOSPHORUS mg/dL 3 8 3 7 4 6*       Radiology Results:   XR chest 2 views   Final Result by Alexandre Berry MD (11/05 1344)      Interval development of mild vascular congestion            Workstation performed: [id number]

## 2019-11-06 ENCOUNTER — APPOINTMENT (INPATIENT)
Dept: RADIOLOGY | Facility: HOSPITAL | Age: 54
DRG: 682 | End: 2019-11-06
Payer: MEDICARE

## 2019-11-06 LAB
ANION GAP SERPL CALCULATED.3IONS-SCNC: 10 MMOL/L (ref 4–13)
BACTERIA UR CULT: NORMAL
BASOPHILS # BLD AUTO: 0.08 THOUSANDS/ΜL (ref 0–0.1)
BASOPHILS NFR BLD AUTO: 1 % (ref 0–1)
BUN SERPL-MCNC: 42 MG/DL (ref 5–25)
CALCIUM SERPL-MCNC: 7.8 MG/DL (ref 8.3–10.1)
CHLORIDE SERPL-SCNC: 108 MMOL/L (ref 100–108)
CO2 SERPL-SCNC: 18 MMOL/L (ref 21–32)
CREAT SERPL-MCNC: 2.8 MG/DL (ref 0.6–1.3)
EOSINOPHIL # BLD AUTO: 0.41 THOUSAND/ΜL (ref 0–0.61)
EOSINOPHIL NFR BLD AUTO: 7 % (ref 0–6)
ERYTHROCYTE [DISTWIDTH] IN BLOOD BY AUTOMATED COUNT: 18.6 % (ref 11.6–15.1)
GFR SERPL CREATININE-BSD FRML MDRD: 18 ML/MIN/1.73SQ M
GLUCOSE SERPL-MCNC: 111 MG/DL (ref 65–140)
GLUCOSE SERPL-MCNC: 114 MG/DL (ref 65–140)
GLUCOSE SERPL-MCNC: 122 MG/DL (ref 65–140)
GLUCOSE SERPL-MCNC: 131 MG/DL (ref 65–140)
GLUCOSE SERPL-MCNC: 145 MG/DL (ref 65–140)
HCT VFR BLD AUTO: 23.4 % (ref 34.8–46.1)
HGB BLD-MCNC: 7.1 G/DL (ref 11.5–15.4)
IMM GRANULOCYTES # BLD AUTO: 0.09 THOUSAND/UL (ref 0–0.2)
IMM GRANULOCYTES NFR BLD AUTO: 2 % (ref 0–2)
LYMPHOCYTES # BLD AUTO: 1.18 THOUSANDS/ΜL (ref 0.6–4.47)
LYMPHOCYTES NFR BLD AUTO: 21 % (ref 14–44)
MAGNESIUM SERPL-MCNC: 2.2 MG/DL (ref 1.6–2.6)
MCH RBC QN AUTO: 30 PG (ref 26.8–34.3)
MCHC RBC AUTO-ENTMCNC: 30.3 G/DL (ref 31.4–37.4)
MCV RBC AUTO: 99 FL (ref 82–98)
MONOCYTES # BLD AUTO: 0.42 THOUSAND/ΜL (ref 0.17–1.22)
MONOCYTES NFR BLD AUTO: 8 % (ref 4–12)
NEUTROPHILS # BLD AUTO: 3.35 THOUSANDS/ΜL (ref 1.85–7.62)
NEUTS SEG NFR BLD AUTO: 61 % (ref 43–75)
NRBC BLD AUTO-RTO: 0 /100 WBCS
PLATELET # BLD AUTO: 161 THOUSANDS/UL (ref 149–390)
PLATELET # BLD AUTO: 169 THOUSANDS/UL (ref 149–390)
PMV BLD AUTO: 12.7 FL (ref 8.9–12.7)
PMV BLD AUTO: 12.9 FL (ref 8.9–12.7)
POTASSIUM SERPL-SCNC: 3.9 MMOL/L (ref 3.5–5.3)
RBC # BLD AUTO: 2.37 MILLION/UL (ref 3.81–5.12)
SODIUM SERPL-SCNC: 136 MMOL/L (ref 136–145)
WBC # BLD AUTO: 5.53 THOUSAND/UL (ref 4.31–10.16)

## 2019-11-06 PROCEDURE — 97167 OT EVAL HIGH COMPLEX 60 MIN: CPT

## 2019-11-06 PROCEDURE — 80048 BASIC METABOLIC PNL TOTAL CA: CPT | Performed by: INTERNAL MEDICINE

## 2019-11-06 PROCEDURE — 82948 REAGENT STRIP/BLOOD GLUCOSE: CPT

## 2019-11-06 PROCEDURE — 99233 SBSQ HOSP IP/OBS HIGH 50: CPT | Performed by: INTERNAL MEDICINE

## 2019-11-06 PROCEDURE — 83735 ASSAY OF MAGNESIUM: CPT | Performed by: INTERNAL MEDICINE

## 2019-11-06 PROCEDURE — 99232 SBSQ HOSP IP/OBS MODERATE 35: CPT | Performed by: INTERNAL MEDICINE

## 2019-11-06 PROCEDURE — G8988 SELF CARE GOAL STATUS: HCPCS

## 2019-11-06 PROCEDURE — G8979 MOBILITY GOAL STATUS: HCPCS

## 2019-11-06 PROCEDURE — 74176 CT ABD & PELVIS W/O CONTRAST: CPT

## 2019-11-06 PROCEDURE — G8987 SELF CARE CURRENT STATUS: HCPCS

## 2019-11-06 PROCEDURE — 85049 AUTOMATED PLATELET COUNT: CPT | Performed by: INTERNAL MEDICINE

## 2019-11-06 PROCEDURE — 85025 COMPLETE CBC W/AUTO DIFF WBC: CPT | Performed by: INTERNAL MEDICINE

## 2019-11-06 PROCEDURE — 97163 PT EVAL HIGH COMPLEX 45 MIN: CPT

## 2019-11-06 PROCEDURE — G8978 MOBILITY CURRENT STATUS: HCPCS

## 2019-11-06 RX ORDER — TACROLIMUS 1 MG/1
3 CAPSULE ORAL DAILY
Status: DISCONTINUED | OUTPATIENT
Start: 2019-11-07 | End: 2019-11-08 | Stop reason: HOSPADM

## 2019-11-06 RX ORDER — TACROLIMUS 1 MG/1
2 CAPSULE ORAL EVERY EVENING
Status: DISCONTINUED | OUTPATIENT
Start: 2019-11-06 | End: 2019-11-08 | Stop reason: HOSPADM

## 2019-11-06 RX ORDER — ACETAMINOPHEN 325 MG/1
650 TABLET ORAL EVERY 6 HOURS PRN
Status: DISCONTINUED | OUTPATIENT
Start: 2019-11-06 | End: 2019-11-08 | Stop reason: HOSPADM

## 2019-11-06 RX ADMIN — LEVOTHYROXINE SODIUM 125 MCG: 125 TABLET ORAL at 06:13

## 2019-11-06 RX ADMIN — BUSPIRONE HYDROCHLORIDE 5 MG: 5 TABLET ORAL at 17:48

## 2019-11-06 RX ADMIN — SODIUM BICARBONATE 650 MG TABLET 1300 MG: at 17:45

## 2019-11-06 RX ADMIN — SODIUM BICARBONATE 650 MG TABLET 1300 MG: at 08:15

## 2019-11-06 RX ADMIN — SODIUM BICARBONATE 100 ML/HR: 84 INJECTION, SOLUTION INTRAVENOUS at 02:54

## 2019-11-06 RX ADMIN — ONDANSETRON 4 MG: 2 INJECTION INTRAMUSCULAR; INTRAVENOUS at 22:29

## 2019-11-06 RX ADMIN — CEFTRIAXONE SODIUM 1000 MG: 10 INJECTION, POWDER, FOR SOLUTION INTRAVENOUS at 17:50

## 2019-11-06 RX ADMIN — SODIUM BICARBONATE 650 MG TABLET 1300 MG: at 22:10

## 2019-11-06 RX ADMIN — CLONIDINE HYDROCHLORIDE 0.3 MG: 0.1 TABLET ORAL at 12:21

## 2019-11-06 RX ADMIN — ARIPIPRAZOLE 30 MG: 15 TABLET ORAL at 22:13

## 2019-11-06 RX ADMIN — BUSPIRONE HYDROCHLORIDE 5 MG: 5 TABLET ORAL at 08:16

## 2019-11-06 RX ADMIN — ROPINIROLE HYDROCHLORIDE 0.25 MG: 0.25 TABLET, FILM COATED ORAL at 08:19

## 2019-11-06 RX ADMIN — ACETAMINOPHEN 650 MG: 325 TABLET ORAL at 13:29

## 2019-11-06 RX ADMIN — AMLODIPINE BESYLATE 10 MG: 10 TABLET ORAL at 08:16

## 2019-11-06 RX ADMIN — ONDANSETRON 4 MG: 2 INJECTION INTRAMUSCULAR; INTRAVENOUS at 15:30

## 2019-11-06 RX ADMIN — SERTRALINE HYDROCHLORIDE 200 MG: 100 TABLET ORAL at 08:16

## 2019-11-06 RX ADMIN — HYDRALAZINE HYDROCHLORIDE 50 MG: 50 TABLET ORAL at 06:12

## 2019-11-06 RX ADMIN — HEPARIN SODIUM 5000 UNITS: 5000 INJECTION INTRAVENOUS; SUBCUTANEOUS at 22:09

## 2019-11-06 RX ADMIN — HEPARIN SODIUM 5000 UNITS: 5000 INJECTION INTRAVENOUS; SUBCUTANEOUS at 06:12

## 2019-11-06 RX ADMIN — HYDRALAZINE HYDROCHLORIDE 50 MG: 50 TABLET ORAL at 13:15

## 2019-11-06 RX ADMIN — METOPROLOL TARTRATE 50 MG: 50 TABLET, FILM COATED ORAL at 22:11

## 2019-11-06 RX ADMIN — PREDNISONE 5 MG: 5 TABLET ORAL at 12:22

## 2019-11-06 RX ADMIN — DULOXETINE HYDROCHLORIDE 60 MG: 60 CAPSULE, DELAYED RELEASE ORAL at 08:15

## 2019-11-06 RX ADMIN — MELATONIN 3000 UNITS: at 08:16

## 2019-11-06 RX ADMIN — HYDRALAZINE HYDROCHLORIDE 50 MG: 50 TABLET ORAL at 22:11

## 2019-11-06 RX ADMIN — HEPARIN SODIUM 5000 UNITS: 5000 INJECTION INTRAVENOUS; SUBCUTANEOUS at 13:15

## 2019-11-06 RX ADMIN — DOXAZOSIN 4 MG: 4 TABLET ORAL at 22:10

## 2019-11-06 RX ADMIN — INSULIN GLARGINE 5 UNITS: 100 INJECTION, SOLUTION SUBCUTANEOUS at 22:10

## 2019-11-06 RX ADMIN — ASPIRIN 81 MG 81 MG: 81 TABLET ORAL at 08:15

## 2019-11-06 RX ADMIN — CLONIDINE HYDROCHLORIDE 0.3 MG: 0.1 TABLET ORAL at 22:11

## 2019-11-06 RX ADMIN — FOLIC ACID 1000 MCG: 1 TABLET ORAL at 08:16

## 2019-11-06 RX ADMIN — TACROLIMUS 2 MG: 1 CAPSULE ORAL at 22:13

## 2019-11-06 RX ADMIN — PRAVASTATIN SODIUM 80 MG: 80 TABLET ORAL at 17:45

## 2019-11-06 RX ADMIN — SEVELAMER HYDROCHLORIDE 800 MG: 800 TABLET, FILM COATED PARENTERAL at 17:45

## 2019-11-06 RX ADMIN — IOHEXOL 50 ML: 240 INJECTION, SOLUTION INTRATHECAL; INTRAVASCULAR; INTRAVENOUS; ORAL at 08:35

## 2019-11-06 RX ADMIN — SODIUM BICARBONATE 80 ML/HR: 84 INJECTION, SOLUTION INTRAVENOUS at 15:27

## 2019-11-06 RX ADMIN — ROPINIROLE HYDROCHLORIDE 0.25 MG: 0.25 TABLET, FILM COATED ORAL at 17:46

## 2019-11-06 RX ADMIN — TACROLIMUS 3 MG: 1 CAPSULE ORAL at 08:19

## 2019-11-06 RX ADMIN — CLONIDINE HYDROCHLORIDE 0.3 MG: 0.1 TABLET ORAL at 06:12

## 2019-11-06 RX ADMIN — FERROUS SULFATE TAB 325 MG (65 MG ELEMENTAL FE) 325 MG: 325 (65 FE) TAB at 08:16

## 2019-11-06 RX ADMIN — METOPROLOL TARTRATE 50 MG: 50 TABLET, FILM COATED ORAL at 08:16

## 2019-11-06 RX ADMIN — SEVELAMER HYDROCHLORIDE 800 MG: 800 TABLET, FILM COATED PARENTERAL at 12:22

## 2019-11-06 NOTE — PROGRESS NOTES
Progress Note - Infectious Disease   Domitila Kennedy 53 y o  female MRN: [id number]  [facility] Encounter: [id number]      Impression/Plan:  1  Symptomatic urinary tract infection  Patient presents at this time with symptomatic urinary tract infection  She reports having dysuria, frequent, and urgency  She reported progressive symptoms despite taking Keflex  Urine culture however on 11/04 is without growth and repeat cultures are pending  Patient has had improvement with cefepime  Would question if she has developed a resistant organism and hence persistent symptoms  Other possibilities to consider are development of chronic cystitis, viral cystitis with either BK or adenovirus  Also question if there is ongoing urinary stasis/reflux leading to recurrent episodes of infection  Transplant ultrasound noted  Place patient on IV ceftriaxone  Follow up pending urine culture  Monitor for urinary symptoms  Follow-up CT of the abdomen pathology  Follow-up BK virus PCR in blood and urine  Follow-up adenovirus PCR in the urine  Ordered CMV PCR in blood and urine  Repeat CBC/BMP tomorrow  Continue to trend fever curve/vitals  Additional supportive care as per primary  Additional interventions pending clinical course     2  Chronic kidney disease  Patient with underlying chronic kidney disease  Creatinine appears to be near baseline  Recent transplant ultrasounds noted with increased indices and question of underlying rejection  Ongoing follow-up by Nephrology  Dose adjust antibiotics as needed  Additional labs ordered as above  Supportive care otherwise as per primary     3  Chronic immunosuppression and Renal transplant  Patient remains on chronic immunosuppression with tacrolimus and prednisone because of her prior transplant  Ongoing follow-up by Nephrology  Continue to monitor tacrolimus level  Additional care as above      4  Multiple myeloma    Patient remains on chemotherapy for underlying multiple myeloma  Recommend follow-up with Oncology as outpatient      Above plan discussed in detail with the patient at bedside      ID consult service will continue to follow  Antibiotics:  Ceftriaxone 2    24 hour events:  No acute events noted overnight on chart review  Patient is currently afebrile and without leukocytosis  Urine cultures are pending  Renal ultrasound noted  Patient's other vitals are stable  Creatinine stable at 2 8  Relative eosinophilia noted on differential  Viral testing pending, CT imaging pending    Subjective:  Patient currently denies having any nausea, vomiting, chest pain or shortness of breath  She is tolerating antibiotic without acute issue  She reports that her urine is dark and has some smell to it but otherwise is not having any further frequency or dysuria  Objective:  Vitals:  Temp:  [97 5 °F (36 4 °C)-98 2 °F (36 8 °C)] 98 2 °F (36 8 °C)  HR:  [56-66] 60  Resp:  [16-18] 16  BP: (154-185)/(55-75) 155/57  SpO2:  [95 %-99 %] 99 %  Temp (24hrs), Av 9 °F (36 6 °C), Min:97 5 °F (36 4 °C), Max:98 2 °F (36 8 °C)  Current: Temperature: 98 2 °F (36 8 °C)    Physical Exam:   General Appearance:  Alert, interactive, nontoxic, no acute distress  Chronically ill-appearing and obese  Patient appears older than stated age   Throat: Oropharynx moist without lesions  Poor dentition noted   Lungs:   Clear to auscultation bilaterally; no wheezes, rhonchi or rales; respirations unlabored on room air   Heart:  RRR; no murmur, rub or gallop   Abdomen:   Soft, non-tender, non-distended, positive bowel sounds  No pain around transplant site  Extremities: No clubbing, cyanosis or edema   Skin: No new rashes or lesions  No new draining wounds noted         Labs, Imaging, & Other studies:   All pertinent labs and imaging studies were personally reviewed  Results from last 7 days   Lab Units 19  0508 19  1204   WBC Thousand/uL 5 53 5 94   HEMOGLOBIN g/dL 7 1* 7 5* PLATELETS Thousands/uL 169 173     Results from last 7 days   Lab Units 11/06/19  0508 11/05/19  1204 11/04/19  0713   POTASSIUM mmol/L 3 9 4 5 4 4   CHLORIDE mmol/L 108 118* 116*   CO2 mmol/L 18* 15* 16*   BUN mg/dL 42* 50* 47*   CREATININE mg/dL 2 80* 2 86* 2 76*   EGFR ml/min/1 73sq m 18 18 19   CALCIUM mg/dL 7 8* 8 7 8 5   AST U/L  --  10 15   ALT U/L  --  18 24   ALK PHOS U/L  --  111 102     Results from last 7 days   Lab Units 11/04/19  0713   URINE CULTURE  <10,000 cfu/ml

## 2019-11-06 NOTE — OCCUPATIONAL THERAPY NOTE
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 ([facility] [address] )    Essential hypertension    Anemia    Multiple myeloma not having achieved remission ([facility] [address] )    Acute metabolic encephalopathy    Metabolic acidosis    Acute renal failure (HCC)    Past Medical History  Past Medical History:   Diagnosis Date    Abnormal liver function test     Acute kidney injury ([facility] [address] )     Acute on chronic congestive heart failure (HCC)     Allergic urticaria     Anemia     Cancer (HCC)     Multiple myeloma    Cervical dysplasia     Cholelithiasis     Chronic diastolic (congestive) heart failure ([facility] [address] ) 9/18/2017    Diabetes mellitus (HCC)     Previous, controlled with diet    Diabetes mellitus with foot ulcer ([facility] [address] )     Disease of thyroid gland     Encephalopathy     Hematuria     + leak est- secondary to UTIs/panc drainage    History of transfusion     Hyperkalemia     Hypertension     Iliotibial band syndrome     Lumbar radiculopathy     Multiple myeloma (HCC)     Multiple myeloma (HCC)     Night blindness     Nonrheumatic aortic (valve) insufficiency     Pneumonia     Renal disorder     Retinopathy     Seborrhea     Seizure ([facility] [address] )     Shingles     Sinus tachycardia     B blocker - cardio echo stress test 02 normal/neg LE doppler 2/02 OK and 12/07    Status post simultaneous kidney and pancreas transplant ([facility] [address] )     Toe amputation status     Trochanteric bursitis      Past Surgical History  Past Surgical History:   Procedure Laterality Date    CATARACT EXTRACTION      CHOLECYSTECTOMY      COLONOSCOPY      two polyps in the rectum removed and biopsied diverticulosis in the sigmoid colon, external hemorrhoiods- Dr Barfield    COMBINED KIDNEY-PANCREAS TRANSPLANT N/A     CT BONE MARROW BIOPSY AND ASPIRATION  4/17/2019    CYSTOSCOPY N/A 10/13/2016    Procedure: CYSTOSCOPY, retrograde pyelogram, biopsy of ureteral polyp; Surgeon: Waylon Cherry MD;  Location: [facility];  Service:    [facility] DILATION AND CURETTAGE OF UTERUS      ESOPHAGOGASTRODUODENOSCOPY N/A 11/20/2017    Procedure: ESOPHAGOGASTRODUODENOSCOPY (EGD); Surgeon: Elvis Neely DO;  Location: [facility]; Service: Gastroenterology    EYE SURGERY      cataracts    FOOT AMPUTATION THROUGH METATARSAL Left     FOOT SURGERY Right     excision of metatarsal heads    HALLUX VALGUS CORRECTION Right     NEPHRECTOMY TRANSPLANTED ORGAN      PANCREATIC TRANSPLANT REMOVAL  1998 11/06/19 1225   Note Type   Note type Eval only   Restrictions/Precautions   Weight Bearing Precautions Per Order No   Braces or Orthoses   (Per pt orthotic sneak for LLE-history of transmetatarsal amputation)   Other Precautions Telemetry; Fall Risk;Pain   Pain Assessment   Pain Assessment Craft-Baker FACES   Craft-Baker FACES Pain Rating 2   Pain Type Acute pain   Pain Location Head   Hospital Pain Intervention(s) Emotional support;Repositioned; Ambulation/increased activity; Rest;Environmental changes   Home Living   Type of [address] [facility] One level; Able to live on main level with bedroom/bathroom  (3 [address]I)   Bathroom Shower/Tub Tub/shower unit   Bathroom Toilet Standard   Bathroom Equipment Grab bars in shower   [address] Walker;Cane;Grab bars   Prior Function   Level of [place] Independent with ADLs and functional mobility   Lives With Alone   Receives Help From   (supportive parents -live local)   ADL Assistance Independent   IADLs Independent   Falls in the last 6 months 0   Vocational On disability   Lifestyle   Autonomy I ADL's/IADL's, +SPC with functional ambulation, +drives during the day, on disability   Reciprocal Relationships supportive parents live local   Service to Others on disability   Intrinsic Gratification art/crafts-painting   Psychosocial   Psychosocial (WDL) WDL   Subjective   Subjective pt received from stretcher returning to room from  testing-agreeable to therapy   ADL   Where [address] Deficit Setup;Don/doff L sock; Don/doff R sock;Steadying  (S/set-up to don socks in long sitting)   Bed Mobility   Supine to Sit 5  Supervision   Additional items Assist x 1  (from stretcher)   Transfers   Sit to Stand 4  Minimal assistance   Additional items Assist x 1; Increased time required;Verbal cues   Stand to Sit 4  Minimal assistance   Additional items Increased time required;Verbal cues; Assist x 1   Functional Mobility   Functional Mobility 4  Minimal assistance   Additional Comments min a/cg with SPC short distance functional mobility -pt declined to don orthotic sneaker for LLE transmetatarsal amputation stating "its harder to walk with that"   Additional items SPC   Balance   Static Sitting Fair +   Dynamic Sitting Fair   Static Standing Fair   Dynamic Standing Fair -   Ambulatory Poor +   Activity Tolerance   Activity Tolerance Patient limited by fatigue   Medical Staff Made Aware PT, CM   Nurse Made Aware RN cleared pt for therapy   RUE Assessment   RUE Assessment WFL   LUE Assessment   LUE Assessment WFL   Hand Function   Gross Motor Coordination Functional   Fine Motor Coordination Functional   Vision-Basic Assessment   Current Vision Wears glasses all the time   Visual History   (Night blindness -drives during the day)   Cognition Overall Cognitive Status   (Continue to assess)   Arousal/Participation Alert; Cooperative   Attention Attends with cues to redirect   Orientation Level Oriented to person;Oriented to place;Oriented to time;Oriented to situation   Memory   (able to provide full social history)   Following Commands Follows multistep commands with increased time or repetition   Comments pt scored a 20/30 on MOCA testing last admission -continue to assess   Assessment   Limitation Decreased ADL status; Decreased endurance;Decreased cognition;Decreased Safe judgement during ADL;Decreased self-care trans;Decreased high-level ADLs   Prognosis Fair   Assessment Pt is a 53 y o  female who was admitted to [facility] on 11/5/2019 with weakness/fatigue, mild confusion, mild abdominal pain, increasing pain at the transplant site, Acute renal failure superimposed on stage 4 chronic kidney disease ([facility] [address] ), UTI, acute metabolic encephalopathy, renal and pancreatic transplant in 1998, multiple myeloma not having achieved remission, anemia, HTN, type 1 diabetes    Pt's problem list also includes PMH of FELIPE, hyperlipidemia, osteoporsis, restless leg syndrome, recurrent UTI, anxiety, OBA, aortic regurgitation, acid reflux disease, a-fib, immunosuppression    At baseline pt was completing ADL's/IADl's with I, +SPC with functional ambulation, +drives during the day Pt lives alone in a first floor apartment with 2STE  Currently pt requires S/set-up UB, min a LB for overall ADLS and min a with SPC for functional mobility/transfers  Pt currently presents with impairments in the following categories -steps to enter environment, limited home support, difficulty performing ADLS, difficulty performing IADLS , compliance and health management  activity tolerance, endurance, standing balance/tolerance, safety  and judgement    These impairments, as well as pt's fatigue, decreased caregiver support and risk for falls  limit pt's ability to safely engage in all baseline areas of occupation, includingdressing, toileting, functional mobility/transfers, community mobility, laundry , driving, house maintenance, medication management, meal prep, cleaning and leisure activities  From OT standpoint, recommend home with family support pending cognitive evaluation upon D/C  OT will continue to follow to address the below stated goals  Goals   Patient Goals get better   LTG Time Frame 10-14   Long Term Goal #1 see established goals   Plan   Treatment Interventions ADL retraining;Functional transfer training; Endurance training;Cognitive reorientation;Patient/family training;Equipment evaluation/education; Compensatory technique education; Activityengagement;Continued evaluation   Goal Expiration Date 11/20/19   OT Frequency 3-5x/wk   Recommendation   OT Discharge Recommendation Home with family support  (pending cognitive evaluation)   Equipment Recommended Bedside commode;Tub seat with back   OT - OK to Discharge Yes   Barthel Index   Feeding 10   Bathing 0   Grooming Score 5   Dressing Score 5   Bladder Score 10   Bowels Score 10   Toilet Use Score 5   Transfers (Bed/Chair) Score 10   Mobility (Level Surface) Score 0   Stairs Score 0   Barthel Index Score 55   Modified [place] Scale   Modified [place] Scale 4       Occupational Therapy Goals:    *Mod I with bed mobility to engage in functional tasks  *Mod I Adl's after setup with use of AE PRN  *Mod I toileting and clothing management   *Mod I functional mobility and transfers to/from all surfaces with Fair + dynamic balance and safety for participation in dynamic adls and iadl tasks   *Assess DME needs   *Increase activity tolerance to 25-30 minutes for participation in adls and enjoyable activities  *Pt to participate in further cognitive testing with good attention and participation to assist with safe d/c recommendations  *Mod I with Simulated IADL management task      Rafael AGRAWAL, OTR/L

## 2019-11-06 NOTE — PLAN OF CARE
Problem: Potential for Falls  Goal: Patient will remain free of falls  Description  INTERVENTIONS:  - Assess patient frequently for physical needs  -  Identify cognitive and physical deficits and behaviors that affect risk of falls    -  [place] fall precautions as indicated by assessment   - Educate patient/family on patient safety including physical limitations  - Instruct patient to call for assistance with activity based on assessment  - Modify environment to reduce risk of injury  - Consider OT/PT consult to assist with strengthening/mobility  Outcome: Progressing     Problem: PAIN - ADULT  Goal: Verbalizes/displays adequate comfort level or baseline comfort level  Description  Interventions:  - Encourage patient to monitor pain and request assistance  - Assess pain using appropriate pain scale  - Administer analgesics based on type and severity of pain and evaluate response  - Implement non-pharmacological measures as appropriate and evaluate response  - Consider cultural and social influences on pain and pain management  - Notify physician/advanced practitioner if interventions unsuccessful or patient reports new pain  Outcome: Progressing     Problem: INFECTION - ADULT  Goal: Absence or prevention of progression during hospitalization  Description  INTERVENTIONS:  - Assess and monitor for signs and symptoms of infection  - Monitor lab/diagnostic results  - Monitor all insertion sites, i e  indwelling lines, tubes, and drains  - Monitor endotracheal if appropriate and nasal secretions for changes in amount and color  - [place] appropriate cooling/warming therapies per order  - Administer medications as ordered  - Instruct and encourage patient and family to use good hand hygiene technique  - Identify and instruct in appropriate isolation precautions for identified infection/condition  Outcome: Progressing  Goal: Absence of fever/infection during neutropenic period  Description  INTERVENTIONS:  - Monitor WBC    Outcome: Progressing     Problem: SAFETY ADULT  Goal: Maintain or return to baseline ADL function  Description  INTERVENTIONS:  -  Assess patient's ability to carry out ADLs; assess patient's baseline for ADL function and identify physical deficits which impact ability to perform ADLs (bathing, care of mouth/teeth, toileting, grooming, dressing, etc )  - Assess/evaluate cause of self-care deficits   - Assess range of motion  - Assess patient's mobility; develop plan if impaired  - Assess patient's need for assistive devices and provide as appropriate  - Encourage maximum independence but intervene and supervise when necessary  - Involve family in performance of ADLs  - Assess for home care needs following discharge   - Consider OT consult to assist with ADL evaluation and planning for discharge  - Provide patient education as appropriate  Outcome: Progressing  Goal: Maintain or return mobility status to optimal level  Description  INTERVENTIONS:  - Assess patient's baseline mobility status (ambulation, transfers, stairs, etc )    - Identify cognitive and physical deficits and behaviors that affect mobility  - Identify mobility aids required to assist with transfers and/or ambulation (gait belt, sit-to-stand, lift, walker, cane, etc )  - [place] fall precautions as indicated by assessment  - Record patient progress and toleration of activity level on Mobility SBAR; progress patient to next Phase/Stage  - Instruct patient to call for assistance with activity based on assessment  - Consider rehabilitation consult to assist with strengthening/weightbearing, etc   Outcome: Progressing     Problem: DISCHARGE PLANNING  Goal: Discharge to home or other facility with appropriate resources  Description  INTERVENTIONS:  - Identify barriers to discharge w/patient and caregiver  - Arrange for needed discharge resources and transportation as appropriate  - Identify discharge learning needs (meds, wound care, etc )  - Arrange for interpretive services to assist at discharge as needed  - Refer to Case Management Department for coordinating discharge planning if the patient needs post-hospital services based on physician/advanced practitioner order or complex needs related to functional status, cognitive ability, or social support system  Outcome: Progressing     Problem: Knowledge Deficit  Goal: Patient/family/caregiver demonstrates understanding of disease process, treatment plan, medications, and discharge instructions  Description  Complete learning assessment and assess knowledge base    Interventions:  - Provide teaching at level of understanding  - Provide teaching via preferred learning methods  Outcome: Progressing

## 2019-11-06 NOTE — PLAN OF CARE
Problem: OCCUPATIONAL THERAPY ADULT  Goal: Performs self-care activities at highest level of function for planned discharge setting  See evaluation for individualized goals  Description  Treatment Interventions: ADL retraining, Functional transfer training, Endurance training, Cognitive reorientation, Patient/family training, Equipment evaluation/education, Compensatory technique education, Activityengagement, Continued evaluation  Equipment Recommended: Bedside commode, Tub seat with back       See flowsheet documentation for full assessment, interventions and recommendations  Note:   Limitation: Decreased ADL status, Decreased endurance, Decreased cognition, Decreased Safe judgement during ADL, Decreased self-care trans, Decreased high-level ADLs  Prognosis: Fair  Assessment: Pt is a 53 y o  female who [facility] with weakness/fatigue, mild confusion, mild abdominal pain, increasing pain at the transplant site, Acute renal failure superimposed on stage 4 chronic kidney disease ([facility] [address] ), UTI, acute metabolic encephalopathy, renal and pancreatic transplant in 1998, multiple myeloma not having achieved remission, anemia, HTN, type 1 diabetes    Pt's problem list also includes PMH of FELIPE, hyperlipidemia, osteoporsis, restless leg syndrome, recurrent UTI, anxiety, OBA, aortic regurgitation, acid reflux disease, a-fib, immunosuppression    At baseline pt was completing ADL's/IADl's with I, +SPC with functional ambulation, +drives during the day Pt lives alone in a first floor apartment with 2STE  Currently pt requires S/set-up UB, min a LB for overall ADLS and min a with SPC for functional mobility/transfers   Pt currently presents with impairments in the following categories -steps to enter environment, limited home support, difficulty performing ADLS, difficulty performing IADLS , compliance and health management  activity tolerance, endurance, standing balance/tolerance, safety  and judgement   These impairments, as well as pt's fatigue, decreased caregiver support and risk for falls  limit pt's ability to safely engage in all baseline areas of occupation, includingdressing, toileting, functional mobility/transfers, community mobility, laundry , driving, house maintenance, medication management, meal prep, cleaning and leisure activities  From OT standpoint, recommend home with family support pending cognitive evaluation upon D/C  OT will continue to follow to address the below stated goals  OT Discharge Recommendation: Home with family support(pending cognitive evaluation)  OT - OK to Discharge:  Yes

## 2019-11-06 NOTE — PROGRESS NOTES
NEPHROLOGY PROGRESS NOTE    Robert Stevenson 53 y o  female MRN: [id number]  Unit/Bed#: [facility] 834-01 Encounter: [id number]  Reason for Consult:  Acute on chronic kidney disease    The patient still feels very weak tired also is little bit confused in sluggish  She states she really does not feel that much better than she did when she was admitted  She reports no pain in her abdomen  She has a little bit of dysuria  ASSESSMENT/PLAN:  1  Renal    Patient has history of renal transplantation 1998  She had mild acute renal failure with an admission creatinine around 2 8 which is slightly above her baseline  Her current immunosuppression is tacrolimus 3 mg in the morning and 2 mg in the evening so the dose was adjusted and also prednisone 5 mg a day  She likely has some pre renal azotemia due to her  Presumed sepsis  The patient also has a non gap metabolic acidosis and given her history of pancreas transplant which is no longer functional with respect insulin production it still have the exocrine function and this results in bicarb losses in the urine as that is where they will drain the exocrine secretions  She is on chronic sodium bicarb orally and is on current with fluids with bicarbonate will continue for another 24 hours  Immunosuppressive dose adjusted with respect to tacrolimus  Continue IV fluids with bicarb as above  BMP a m     2  Sepsis    The patient has urinalysis that appears to have infection  She has been placed on empiric antibiotics and urine cultures are pending  Will follow  3  Immunosuppression  Tacrolimus 3 mg in the morning 2 mg in the evening, prednisone 5 mg a day  SUBJECTIVE:  Review of Systems   Constitution: Positive for decreased appetite, malaise/fatigue and night sweats  Negative for chills and fever  HENT: Negative  Eyes: Negative  Cardiovascular: Negative  Negative for chest pain, leg swelling, orthopnea and palpitations  Respiratory: Negative    Negative for cough, shortness of breath and wheezing  Gastrointestinal: Negative  Negative for bloating, abdominal pain, diarrhea, nausea and vomiting  Genitourinary: Positive for dysuria  Negative for bladder incontinence, hematuria and incomplete emptying  Neurological: Positive for weakness  Negative for focal weakness, headaches and tremors  Psychiatric/Behavioral: The patient is nervous/anxious  Feels a little confused at times  OBJECTIVE:  Current Weight: Weight - Scale: 98 kg (216 lb)  Vitals:Temp (24hrs), Av 9 °F (36 6 °C), Min:97 5 °F (36 4 °C), Max:98 2 °F (36 8 °C)  Current: Temperature: 98 2 °F (36 8 °C)   Blood pressure 155/57, pulse 60, temperature 98 2 °F (36 8 °C), resp  rate 16, height 5' 8" (1 727 m), weight 98 kg (216 lb), SpO2 99 %  , Body mass index is 32 84 kg/m²  Intake/Output Summary (Last 24 hours) at 2019 0913  Last data filed at 2019 0300  Gross per 24 hour   Intake 0 ml   Output 550 ml   Net -550 ml       Physical Exam: /57   Pulse 60   Temp 98 2 °F (36 8 °C)   Resp 16   Ht 5' 8" (1 727 m)   Wt 98 kg (216 lb)   LMP  (LMP Unknown)   SpO2 99%   BMI 32 84 kg/m²   Physical Exam   Constitutional: She is oriented to person, place, and time  No distress  Eyes: No scleral icterus  Neck: Neck supple  No JVD present  Cardiovascular: Normal rate and regular rhythm  Exam reveals no gallop and no friction rub  Pulmonary/Chest: Effort normal and breath sounds normal  No respiratory distress  She has no wheezes  She has no rales  Abdominal: Soft  Bowel sounds are normal  She exhibits no distension  There is no tenderness  There is no rebound  Nontender renal allograft  Neurological: She is alert and oriented to person, place, and time         Medications:    Current Facility-Administered Medications:     amLODIPine (NORVASC) tablet 10 mg, 10 mg, Oral, Cass HERNANDEZ, , 10 mg at 19 0816    ARIPiprazole (ABILIFY) tablet 30 mg, 30 mg, Oral, HS, Hildegard Duhamel, DO, 30 mg at 11/05/19 2236    aspirin chewable tablet 81 mg, 81 mg, Oral, Daily, Hildegard Duhamel, DO, 81 mg at 11/06/19 0815    busPIRone (BUSPAR) tablet 5 mg, 5 mg, Oral, BID, Hildegard Duhamel, DO, 5 mg at 11/06/19 0816    cefTRIAXone (ROCEPHIN) 1,000 mg in dextrose 5 % 50 mL IVPB, 1,000 mg, Intravenous, Q24H, Darryll Boeck, MD, Last Rate: 100 mL/hr at 11/05/19 1808, 1,000 mg at 11/05/19 1808    cholecalciferol (VITAMIN D3) tablet 3,000 Units, 3,000 Units, Oral, Daily, Hildegard Duhamel, DO, 3,000 Units at 11/06/19 0816    cloNIDine (CATAPRES) tablet 0 3 mg, 0 3 mg, Oral, Q8H [address], Hildegard Duhamel, DO, 0 3 mg at 11/06/19 0612    doxazosin (CARDURA) tablet 4 mg, 4 mg, Oral, HS, Hildegard Duhamel, DO, 4 mg at 11/05/19 2231    DULoxetine (CYMBALTA) delayed release capsule 60 mg, 60 mg, Oral, Daily, Hildegard Duhamel, DO, 60 mg at 11/06/19 0815    ferrous sulfate tablet 325 mg, 325 mg, Oral, Daily With Breakfast, Hildegard Duhamel, DO, 325 mg at 44/51/05 3713    folic acid (FOLVITE) tablet 1,000 mcg, 1,000 mcg, Oral, Daily, Hildegard Duhamel, DO, 1,000 mcg at 11/06/19 0816    heparin (porcine) subcutaneous injection 5,000 Units, 5,000 Units, Subcutaneous, Q8H [address], 5,000 Units at 11/06/19 0612 **AND** Platelet count, , , Once, Hildegard Duhamel, DO    hydrALAZINE (APRESOLINE) tablet 50 mg, 50 mg, Oral, Q8H [address], Hildegard Duhamel, DO, 50 mg at 11/06/19 0612    insulin glargine (LANTUS) subcutaneous injection 5 Units 0 05 mL, 5 Units, Subcutaneous, HS, Michelle Rene, DO, 5 Units at 11/05/19 2228    insulin lispro (HumaLOG) 100 units/mL subcutaneous injection 1-5 Units, 1-5 Units, Subcutaneous, TID AC **AND** Fingerstick Glucose (POCT), , , TID AC, Michlele Millerel, DO    insulin lispro (HumaLOG) 100 units/mL subcutaneous injection 1-5 Units, 1-5 Units, Subcutaneous, HS, Michelle Rene, DO, 2 Units at 11/05/19 2237    levothyroxine tablet 125 mcg, 125 mcg, Oral, Early Morning, Michelle Millerel, DO, 125 mcg at 11/06/19 2352    LORazepam (ATIVAN) tablet 1 mg, 1 mg, Oral, Q8H PRN, Marcha Gitelman, DO    metoprolol tartrate (LOPRESSOR) tablet 50 mg, 50 mg, Oral, Q12H [address], Marcha Gitelman, DO, 50 mg at 11/06/19 0816    ondansetron (ZOFRAN) injection 4 mg, 4 mg, Intravenous, Q6H PRN, Marcha Gitelman, DO    pravastatin (PRAVACHOL) tablet 80 mg, 80 mg, Oral, Daily With Drucilla Anita, , 80 mg at 11/05/19 1808    predniSONE tablet 5 mg, 5 mg, Oral, Daily, Marcha Gitelman, DO    rOPINIRole (REQUIP) tablet 0 25 mg, 0 25 mg, Oral, BID, Marcha Gitelman, DO, 0 25 mg at 11/06/19 1458    sertraline (ZOLOFT) tablet 200 mg, 200 mg, Oral, Daily, Marcha Gitelman, DO, 200 mg at 11/06/19 0816    sevelamer (RENAGEL) tablet 800 mg, 800 mg, Oral, TID With Meals, Marcha Gitelman, DO, 800 mg at 11/05/19 1859    sodium bicarbonate 150 mEq in dextrose 5 % 1,000 mL infusion, 80 mL/hr, Intravenous, Continuous, Keven Fish MD, Last Rate: 100 mL/hr at 11/06/19 0254, 100 mL/hr at 11/06/19 0254    sodium bicarbonate tablet 1,300 mg, 1,300 mg, Oral, TID, Marcha Gitelman, DO, 1,300 mg at 11/06/19 0815    tacrolimus (PROGRAF) capsule 2 mg, 2 mg, Oral, Daily, Keven Fish MD    tacrolimus (PROGRAF) capsule 3 mg, 3 mg, Oral, Daily, Keven Fish MD    Laboratory Results:  Lab Results   Component Value Date    WBC 5 53 11/06/2019    HGB 7 1 (L) 11/06/2019    HCT 23 4 (L) 11/06/2019    MCV 99 (H) 11/06/2019     11/06/2019     Lab Results   Component Value Date    SODIUM 136 11/06/2019    K 3 9 11/06/2019     11/06/2019    CO2 18 (L) 11/06/2019    BUN 42 (H) 11/06/2019    CREATININE 2 80 (H) 11/06/2019    GLUC 114 11/06/2019    CALCIUM 7 8 (L) 11/06/2019     Lab Results   Component Value Date    CALCIUM 7 8 (L) 11/06/2019    PHOS 3 8 11/05/2019     No results found for: LABPROT

## 2019-11-06 NOTE — PLAN OF CARE
Problem: PHYSICAL THERAPY ADULT  Goal: Performs mobility at highest level of function for planned discharge setting  See evaluation for individualized goals  Description  Treatment/Interventions: Gait training, Bed mobility, Patient/family training, Endurance training, LE strengthening/ROM, Functional transfer training, OT, Spoke to case management, Spoke to nursing          See flowsheet documentation for full assessment, interventions and recommendations  Note:   Prognosis: Good  Problem List: Decreased strength, Decreased endurance, Impaired balance, Decreased mobility, Decreased safety awareness, Pain  Assessment: Pt is 53 y o  female seen for PT evaluation s/p admit to [facility] on 11/5/2019 w/ Acute renal failure superimposed on stage 4 chronic kidney disease ([facility] [address] ), UTI, myeloma  PT consulted to assess pt's functional mobility and d/c needs  Order placed for PT eval and tx, w/ up as tolerated order  Comorbidities affecting pt's physical performance at time of assessment include: fatigue, HA, progressive weakness  PTA, pt was ambulates household distances and on disability  Personal factors affecting pt at time of IE include: inability to navigate level surfaces w/o external assistance, limited home support, decreased initiation and engagement, unable to perform physical activity, inability to perform IADLs and inability to perform ADLs  Please find objective findings from PT assessment regarding body systems outlined above with impairments and limitations including weakness, impaired balance, decreased endurance, impaired coordination, gait deviations, pain, decreased activity tolerance, decreased functional mobility tolerance, decreased safety awareness and fall risk  Pt required increased time to complete supine>sit  Baseline use of SPC with prior TMA  Pt ambulated with mildly unsteady gait without LOB  Pt noted progressive fatigue and weakness recently making home tasks difficult   Pt will benefit from inpt skilled PT to maximize functional mobility & safety  The following objective measures performed on IE also reveal limitations: Barthel Index: 55/100  Pt's clinical presentation is currently unstable/unpredictable seen in pt's presentation of pain  Pt to benefit from continued PT tx to address deficits as defined above and maximize level of functional independent mobility and consistency  From PT/mobility standpoint, recommendation at time of d/c would be HHPT pending clearance from OT cog testing pending progress in order to facilitate return to PLOF  Recommendation: Home PT(pending OT cog testing)     PT - OK to Discharge: Yes    See flowsheet documentation for full assessment

## 2019-11-06 NOTE — PHYSICAL THERAPY NOTE
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 ([place] [address] )    Essential hypertension    Anemia    Status post kidney transplant    Immunosuppression ([place] [address] )    Weakness    Chronic diastolic congestive heart failure (HCC)    Urinary incontinence    Calculus, bladder, diverticulum    Stage 3 chronic kidney disease ([facility]    Hypercalcemia    MGUS (monoclonal gammopathy of unknown significance)    Chronic constipation    Acid reflux disease    Atrial fibrillation ([facility])    Kaiser esophagus    Cataract    Cocaine abuse in remission ([place] [address] )    Gastric and duodenal disease    Diabetic nephropathy ([place] [address] )    Diabetic polyneuropathy ([facility])    Retinopathy, diabetic, bilateral ([place] [address] )    Diastolic dysfunction    Essential and other specified forms of tremor    Failure of pancreas transplant    FELIPE (generalized anxiety disorder)    Hyperlipidemia    Irritable bowel syndrome    Major depressive disorder, recurrent episode, in full remission [facility] )    Aortic regurgitation    OAB (overactive bladder)    Osteoporosis    Peripheral vascular disease ([facility])    Post-traumatic stress disorder, chronic    Recurrent UTI    Restless legs syndrome    Secondary hyperparathyroidism, renal ([facility] )    Anxiety    Neck strain    Neuropathy in diabetes ([facility] )    Periorbital cellulitis of left eye    History of herpes zoster    History of recurrent UTIs    Subclavian artery stenosis, left (HCC)    Abnormal ECG    Impetigo    Multiple myeloma not having achieved remission (HCC)    Rash    Acute renal failure superimposed on stage 4 chronic kidney disease (HCC)    Skin rash    Chronic kidney disease    Acute metabolic encephalopathy    Metabolic acidosis    Ambulatory dysfunction    Acute renal failure (HCC)       Past Medical History  Past Medical History:   Diagnosis Date    Abnormal liver function test     Acute kidney injury ([address] )     Acute on chronic congestive heart failure (HCC)     Allergic urticaria     Anemia     Cancer (HCC)     Multiple myeloma    Cervical dysplasia     Cholelithiasis     Chronic diastolic (congestive) heart failure (HCC) 9/18/2017    Diabetes mellitus (HCC)     Previous, controlled with diet    Diabetes mellitus with foot ulcer ([address] )     Disease of thyroid gland     Encephalopathy     Hematuria     + leak est- secondary to UTIs/panc drainage    History of transfusion     Hyperkalemia     Hypertension     Iliotibial band syndrome     Lumbar radiculopathy     Multiple myeloma (HCC)     Multiple myeloma (HCC)     Night blindness     Nonrheumatic aortic (valve) insufficiency     Pneumonia     Renal disorder     Retinopathy     Seborrhea     Seizure ([facility] [address] )     Shingles     Sinus tachycardia     B blocker - cardio echo stress test 02 normal/neg LE doppler 2/02 OK and 12/07    Status post simultaneous kidney and pancreas transplant ([facility] [address] )     Toe amputation status     Trochanteric bursitis        Past Surgical History  Past Surgical History:   Procedure Laterality Date    CATARACT EXTRACTION      CHOLECYSTECTOMY      COLONOSCOPY      two polyps in the rectum removed and biopsied diverticulosis in the sigmoid colon, external hemorrhoiods- Dr Barfield    COMBINED KIDNEY-PANCREAS TRANSPLANT N/A     CT BONE MARROW BIOPSY AND ASPIRATION  4/17/2019    CYSTOSCOPY N/A 10/13/2016    Procedure: CYSTOSCOPY, retrograde pyelogram, biopsy of ureteral polyp; Surgeon: Kayleigh Geronimo MD;  Location: [facility];  Service:    [facility] DILATION AND CURETTAGE OF UTERUS      ESOPHAGOGASTRODUODENOSCOPY N/A 11/20/2017    Procedure: ESOPHAGOGASTRODUODENOSCOPY (EGD); Surgeon: Coni Thurman DO;  Location: [facility]; Service: Gastroenterology    EYE SURGERY      cataracts    FOOT AMPUTATION THROUGH METATARSAL Left     FOOT SURGERY Right     excision of metatarsal heads    HALLUX VALGUS CORRECTION Right     NEPHRECTOMY TRANSPLANTED ORGAN      PANCREATIC TRANSPLANT REMOVAL  1998 11/06/19 1226   Note Type   Note type Eval/Treat   Pain Assessment   Pain Assessment Craft-Baker FACES   Craft-Baker FACES Pain Rating 2   Pain Type Acute pain   Pain Location Head   Hospital Pain Intervention(s) Repositioned   Response to Interventions tolerated   Home Living   Type of [address] One level   Bathroom Toilet Standard   Home Equipment Cane   Prior Function   Level of [place] Independent with ADLs and functional mobility   Lives With Alone   Receives Help From Family  (pt reports parents live nearby and able to A)   ADL Assistance Independent   IADLs Independent   Falls in the last 6 months 0   Vocational On disability   Restrictions/Precautions   Weight Bearing Precautions Per Order No   Other Precautions Fall Risk;Telemetry;Pain   General   Family/Caregiver Present No   Cognition   Orientation Level Oriented X4   RLE Assessment   RLE Assessment WFL   LLE Assessment   LLE Assessment WFL   Coordination   Movements are Fluid and Coordinated 0   Coordination and Movement Description slow and guarded   Bed Mobility   Supine to Sit 5  Supervision   Additional items Assist x 1   Transfers   Sit to Stand 4  Minimal assistance   Additional items Assist x 1; Increased time required   Stand to Sit 4  Minimal assistance   Additional items Assist x 1; Increased time required   Ambulation/Elevation   Gait pattern Excessively slow;Decreased R stance;Decreased foot clearance   Gait Assistance 4  Minimal assist   Additional items Assist x 1   Assistive Device Straight cane   Distance 10   Balance   Static Sitting Fair +   Dynamic Sitting Fair   Static Standing Fair   Dynamic Standing Fair -   Ambulatory Poor +   Endurance Deficit   Endurance Deficit Yes   Endurance Deficit Description limited by fatigue   Activity Tolerance   Activity Tolerance Patient limited by fatigue   Medical Staff Made Aware OT for D/C planning   Nurse Made Aware yes, nsg gave clearance to work with pt   Assessment   Prognosis Good   Problem List Decreased strength;Decreased endurance; Impaired balance;Decreased mobility; Decreased safety awareness;Pain   Assessment Pt is 53 y o  female seen for PT evaluation s/p admit to [facility] on 11/5/2019 w/ Acute renal failure superimposed on stage 4 chronic kidney disease ([facility] [address] ), UTI, myeloma  PT consulted to assess pt's functional mobility and d/c needs  Order placed for PT eval and tx, w/ up as tolerated order  Comorbidities affecting pt's physical performance at time of assessment include: fatigue, HA, progressive weakness  PTA, pt was ambulates household distances and on disability  Personal factors affecting pt at time of IE include: inability to navigate level surfaces w/o external assistance, limited home support, decreased initiation and engagement, unable to perform physical activity, inability to perform IADLs and inability to perform ADLs  Please find objective findings from PT assessment regarding body systems outlined above with impairments and limitations including weakness, impaired balance, decreased endurance, impaired coordination, gait deviations, pain, decreased activity tolerance, decreased functional mobility tolerance, decreased safety awareness and fall risk  Pt required increased time to complete supine>sit  Baseline use of SPC with prior TMA  Pt ambulated with mildly unsteady gait without LOB   Pt noted progressive fatigue and weakness recently making home tasks difficult  Pt will benefit from inpt skilled PT to maximize functional mobility & safety  The following objective measures performed on IE also reveal limitations: Barthel Index: 55/100  Pt's clinical presentation is currently unstable/unpredictable seen in pt's presentation of pain  Pt to benefit from continued PT tx to address deficits as defined above and maximize level of functional independent mobility and consistency  From PT/mobility standpoint, recommendation at time of d/c would be HHPT pending clearance from OT cog testing pending progress in order to facilitate return to PLOF  Goals   Patient Goals To get stronger   STG Expiration Date 11/18/19   Short Term Goal #1 1  Complete bed mobility and transfers I to decrease need for caregiver in home  2  Ambulate 300' I to complete household and community mobility without A  3  Improve dynamic balance to good to decrease need for UE support during ambulation  4  Be educated & demonstate 12 steps to be able to enter home without A  Plan   Treatment/Interventions Gait training;Bed mobility; Patient/family training; Endurance training;LE strengthening/ROM; Functional transfer training;OT;Spoke to case management;Spoke to nursing   PT Frequency Other (Comment)  (3-5x/wk)   Recommendation   Recommendation Home PT  (pending OT cog testing)   PT - OK to Discharge Yes   Barthel Index   Feeding 10   Bathing 0   Grooming Score 5   Dressing Score 5   Bladder Score 10   Bowels Score 10   Toilet Use Score 5   Transfers (Bed/Chair) Score 10   Mobility (Level Surface) Score 0   Stairs Score 0   Barthel Index Score 55           Richard Miguel PT

## 2019-11-07 ENCOUNTER — TELEPHONE (OUTPATIENT)
Dept: UROLOGY | Facility: CLINIC | Age: 54
End: 2019-11-07

## 2019-11-07 ENCOUNTER — TELEPHONE (OUTPATIENT)
Dept: OTHER | Facility: HOSPITAL | Age: 54
End: 2019-11-07

## 2019-11-07 PROBLEM — G93.41 ACUTE METABOLIC ENCEPHALOPATHY: Status: RESOLVED | Noted: 2019-09-10 | Resolved: 2019-11-07

## 2019-11-07 LAB
ANION GAP SERPL CALCULATED.3IONS-SCNC: 5 MMOL/L (ref 4–13)
BKV DNA # SERPL NAA+PROBE: NEGATIVE COPIES/ML
BKV DNA # UR NAA+PROBE: NEGATIVE COPIES/ML
BKV DNA SERPL NAA+PROBE-LOG#: NORMAL LOG10COPY/ML
BKV DNA SPEC NAA+PROBE-LOG#: NORMAL LOG10COPY/ML
BUN SERPL-MCNC: 37 MG/DL (ref 5–25)
CALCIUM SERPL-MCNC: 8.1 MG/DL (ref 8.3–10.1)
CHLORIDE SERPL-SCNC: 107 MMOL/L (ref 100–108)
CO2 SERPL-SCNC: 27 MMOL/L (ref 21–32)
CREAT SERPL-MCNC: 2.62 MG/DL (ref 0.6–1.3)
ERYTHROCYTE [DISTWIDTH] IN BLOOD BY AUTOMATED COUNT: 18.3 % (ref 11.6–15.1)
GFR SERPL CREATININE-BSD FRML MDRD: 20 ML/MIN/1.73SQ M
GLUCOSE SERPL-MCNC: 107 MG/DL (ref 65–140)
GLUCOSE SERPL-MCNC: 115 MG/DL (ref 65–140)
GLUCOSE SERPL-MCNC: 123 MG/DL (ref 65–140)
GLUCOSE SERPL-MCNC: 134 MG/DL (ref 65–140)
GLUCOSE SERPL-MCNC: 146 MG/DL (ref 65–140)
HCT VFR BLD AUTO: 23.2 % (ref 34.8–46.1)
HGB BLD-MCNC: 7.1 G/DL (ref 11.5–15.4)
MCH RBC QN AUTO: 30.1 PG (ref 26.8–34.3)
MCHC RBC AUTO-ENTMCNC: 30.6 G/DL (ref 31.4–37.4)
MCV RBC AUTO: 98 FL (ref 82–98)
PLATELET # BLD AUTO: 168 THOUSANDS/UL (ref 149–390)
PMV BLD AUTO: 12.6 FL (ref 8.9–12.7)
POTASSIUM SERPL-SCNC: 3.8 MMOL/L (ref 3.5–5.3)
RBC # BLD AUTO: 2.36 MILLION/UL (ref 3.81–5.12)
SODIUM SERPL-SCNC: 139 MMOL/L (ref 136–145)
WBC # BLD AUTO: 5.02 THOUSAND/UL (ref 4.31–10.16)

## 2019-11-07 PROCEDURE — 82948 REAGENT STRIP/BLOOD GLUCOSE: CPT

## 2019-11-07 PROCEDURE — NC001 PR NO CHARGE: Performed by: NURSE PRACTITIONER

## 2019-11-07 PROCEDURE — 85027 COMPLETE CBC AUTOMATED: CPT | Performed by: FAMILY MEDICINE

## 2019-11-07 PROCEDURE — 99233 SBSQ HOSP IP/OBS HIGH 50: CPT | Performed by: INTERNAL MEDICINE

## 2019-11-07 PROCEDURE — 80048 BASIC METABOLIC PNL TOTAL CA: CPT | Performed by: INTERNAL MEDICINE

## 2019-11-07 PROCEDURE — 99232 SBSQ HOSP IP/OBS MODERATE 35: CPT | Performed by: INTERNAL MEDICINE

## 2019-11-07 PROCEDURE — 99223 1ST HOSP IP/OBS HIGH 75: CPT | Performed by: UROLOGY

## 2019-11-07 PROCEDURE — 99232 SBSQ HOSP IP/OBS MODERATE 35: CPT | Performed by: FAMILY MEDICINE

## 2019-11-07 RX ORDER — CEFPODOXIME PROXETIL 200 MG/1
400 TABLET, FILM COATED ORAL
Status: DISCONTINUED | OUTPATIENT
Start: 2019-11-07 | End: 2019-11-08 | Stop reason: HOSPADM

## 2019-11-07 RX ADMIN — SEVELAMER HYDROCHLORIDE 800 MG: 800 TABLET, FILM COATED PARENTERAL at 08:08

## 2019-11-07 RX ADMIN — INSULIN GLARGINE 5 UNITS: 100 INJECTION, SOLUTION SUBCUTANEOUS at 21:18

## 2019-11-07 RX ADMIN — ASPIRIN 81 MG 81 MG: 81 TABLET ORAL at 08:08

## 2019-11-07 RX ADMIN — LORAZEPAM 1 MG: 1 TABLET ORAL at 21:22

## 2019-11-07 RX ADMIN — HYDRALAZINE HYDROCHLORIDE 50 MG: 50 TABLET ORAL at 13:20

## 2019-11-07 RX ADMIN — DULOXETINE HYDROCHLORIDE 60 MG: 60 CAPSULE, DELAYED RELEASE ORAL at 08:08

## 2019-11-07 RX ADMIN — METOPROLOL TARTRATE 50 MG: 50 TABLET, FILM COATED ORAL at 21:18

## 2019-11-07 RX ADMIN — SERTRALINE HYDROCHLORIDE 200 MG: 100 TABLET ORAL at 08:08

## 2019-11-07 RX ADMIN — FOLIC ACID 1000 MCG: 1 TABLET ORAL at 08:08

## 2019-11-07 RX ADMIN — LEVOTHYROXINE SODIUM 125 MCG: 125 TABLET ORAL at 05:27

## 2019-11-07 RX ADMIN — PRAVASTATIN SODIUM 80 MG: 80 TABLET ORAL at 17:23

## 2019-11-07 RX ADMIN — HEPARIN SODIUM 5000 UNITS: 5000 INJECTION INTRAVENOUS; SUBCUTANEOUS at 21:18

## 2019-11-07 RX ADMIN — CLONIDINE HYDROCHLORIDE 0.3 MG: 0.1 TABLET ORAL at 21:13

## 2019-11-07 RX ADMIN — CLONIDINE HYDROCHLORIDE 0.3 MG: 0.1 TABLET ORAL at 12:32

## 2019-11-07 RX ADMIN — ROPINIROLE HYDROCHLORIDE 0.25 MG: 0.25 TABLET, FILM COATED ORAL at 08:08

## 2019-11-07 RX ADMIN — SODIUM BICARBONATE 80 ML/HR: 84 INJECTION, SOLUTION INTRAVENOUS at 08:14

## 2019-11-07 RX ADMIN — BUSPIRONE HYDROCHLORIDE 5 MG: 5 TABLET ORAL at 08:08

## 2019-11-07 RX ADMIN — HYDRALAZINE HYDROCHLORIDE 50 MG: 50 TABLET ORAL at 05:27

## 2019-11-07 RX ADMIN — FERROUS SULFATE TAB 325 MG (65 MG ELEMENTAL FE) 325 MG: 325 (65 FE) TAB at 08:08

## 2019-11-07 RX ADMIN — HYDRALAZINE HYDROCHLORIDE 50 MG: 50 TABLET ORAL at 21:18

## 2019-11-07 RX ADMIN — SODIUM BICARBONATE 650 MG TABLET 1300 MG: at 17:23

## 2019-11-07 RX ADMIN — TACROLIMUS 2 MG: 1 CAPSULE ORAL at 21:12

## 2019-11-07 RX ADMIN — SODIUM BICARBONATE 650 MG TABLET 1300 MG: at 21:12

## 2019-11-07 RX ADMIN — SODIUM BICARBONATE 650 MG TABLET 1300 MG: at 08:08

## 2019-11-07 RX ADMIN — DOXAZOSIN 4 MG: 4 TABLET ORAL at 21:12

## 2019-11-07 RX ADMIN — ROPINIROLE HYDROCHLORIDE 0.25 MG: 0.25 TABLET, FILM COATED ORAL at 17:23

## 2019-11-07 RX ADMIN — TACROLIMUS 3 MG: 1 CAPSULE ORAL at 08:08

## 2019-11-07 RX ADMIN — MELATONIN 3000 UNITS: at 08:08

## 2019-11-07 RX ADMIN — METOPROLOL TARTRATE 50 MG: 50 TABLET, FILM COATED ORAL at 08:08

## 2019-11-07 RX ADMIN — HEPARIN SODIUM 5000 UNITS: 5000 INJECTION INTRAVENOUS; SUBCUTANEOUS at 05:28

## 2019-11-07 RX ADMIN — HEPARIN SODIUM 5000 UNITS: 5000 INJECTION INTRAVENOUS; SUBCUTANEOUS at 13:20

## 2019-11-07 RX ADMIN — CLONIDINE HYDROCHLORIDE 0.3 MG: 0.1 TABLET ORAL at 05:27

## 2019-11-07 RX ADMIN — BUSPIRONE HYDROCHLORIDE 5 MG: 5 TABLET ORAL at 17:23

## 2019-11-07 RX ADMIN — SEVELAMER HYDROCHLORIDE 800 MG: 800 TABLET, FILM COATED PARENTERAL at 12:32

## 2019-11-07 RX ADMIN — SEVELAMER HYDROCHLORIDE 800 MG: 800 TABLET, FILM COATED PARENTERAL at 17:23

## 2019-11-07 RX ADMIN — CEFPODOXIME PROXETIL 400 MG: 200 TABLET, FILM COATED ORAL at 13:20

## 2019-11-07 RX ADMIN — ARIPIPRAZOLE 30 MG: 15 TABLET ORAL at 21:12

## 2019-11-07 RX ADMIN — PREDNISONE 5 MG: 5 TABLET ORAL at 08:08

## 2019-11-07 RX ADMIN — AMLODIPINE BESYLATE 10 MG: 10 TABLET ORAL at 08:08

## 2019-11-07 RX ADMIN — LORAZEPAM 1 MG: 1 TABLET ORAL at 08:56

## 2019-11-07 NOTE — ASSESSMENT & PLAN NOTE
Patient reported some confusion and decreasing oral intak  POA, likely secondary to above  Continue with supportive care and above treatment  Appears to be resolved

## 2019-11-07 NOTE — ASSESSMENT & PLAN NOTE
Patient follow with Dr Rachel Humphries  Currently on Revlimid  Hold for now   Follow-up with Oncology as an outpatient

## 2019-11-07 NOTE — RESTORATIVE TECHNICIAN NOTE
Restorative Specialist Mobility Note       Activity: Ambulate in barron     Assistive Device: Front wheel walker        Repositioned: Supine

## 2019-11-07 NOTE — ASSESSMENT & PLAN NOTE
Patient with history of recurrent UTI  She reported dysuria and urinary frequency  Started on Keflex outpatient 4 days ago with mild improvement in her dysuria  Dysuria improving  Patient is on Vantin  Patient with urinary retention and supposed to be straight cathed as per outpatient records    Given the abnormality seen on the CT scan we will consult Urology

## 2019-11-07 NOTE — SOCIAL WORK
CM met with patient explained CM role  Patient lives alone in an apartment  W/ 3STE  Patient was independent pta  She has a cane, RW and shower chair  Hx of VNa with [facility]  Hx of depression and anxiety she denies IP psych stays  Hx of ETOH use quit drinking more than 7 years ago  Uses [facility] pharmacy on [address]  Father Christina Worthington is POA  [phone]  Has a ride at d/c  Agreeable to VNA for home therapy and RN at d/c  Current with [facility]  Cm will send updates  Cm explained Medicare Bundle notice and provided patient with a copy  CM reviewed d/c planning process including the following: identifying help at home, patient preference for d/c planning needs, Discharge Lounge, [facility] Meds to Bed program, availability of treatment team to discuss questions or concerns patient and/or family may have regarding understanding medications and recognizing signs and symptoms once discharged  CM also encouraged patient to follow up with all recommended appointments after discharge  Patient advised of importance for patient and family to participate in managing patients medical well being

## 2019-11-07 NOTE — PROGRESS NOTES
Progress Note - Infectious Disease   Maryann Deleon 53 y o  female MRN: [id number]  [facility] Encounter: [id number]      Impression/Plan:  1  Symptomatic urinary tract infection and possible transplant pyelonephritis   Patient presents at this time with symptomatic urinary tract infection   She reports having dysuria, frequent, and urgency   She reported progressive symptoms despite taking Keflex  Urine culture however on 11/04 is without growth and repeat cultures are negative  Viral studies are pending  Imaging notable for distended bladder and stranding around the transplant  History of incontinence reported by patient and her mother  I suspect that the patient is developing a chronic cystitis and her frequent UTIs are likely due to ongoing retention  Will switch patient to oral cefpodoxime 400 mg Q 24  Plan to treat for total of 10 days through 11/14  Monitor for further urinary symptoms  Recommend urology evaluation  Follow-up CMV, adenovirus and BK virus studies as outpatient  Continue lab monitoring   Continue to trend fever curve/vitals  Additional supportive care as per primary     2  Chronic kidney disease   Patient with underlying chronic kidney disease   Creatinine appears to be near baseline   Recent transplant ultrasounds noted with increased indices and question of underlying rejection  Ongoing follow-up by Nephrology  Dose adjust antibiotics as needed  Supportive care otherwise as per primary     3  Chronic immunosuppression and Renal transplant   Patient remains on chronic immunosuppression with tacrolimus and prednisone because of her prior transplant    Ongoing follow-up by Nephrology  Continue to monitor tacrolimus level  Additional care as above     4  Multiple myeloma   Patient remains on chemotherapy for underlying multiple myeloma   Recommend follow-up with Oncology as outpatient      Above plan discussed in detail with the patient, her mother and primary service      ID consult service will continue to follow  Antibiotics:  Ceftriaxone 3    24 hour events:  No acute events noted overnight on chart review  Patient is currently afebrile without leukocytosis  Urine cultures without any significant growth  CT of the abdomen noted there is fat stranding present around the transplanted kidney, unclear what the fluid-filled structure between the bladder and the pancreas is  Patient's other vitals are stable  Creatinine at 2 6    Subjective:  Patient currently denies having any nausea, vomiting, chest pain or shortness of breath  She does report still having very foul-smelling urine  Her mother is at bedside and she confirms that the patient's previous pancreatic transplant was attached to her bladder  She also reports that the patient has been told in the past that she was not emptying her bladder completely and she was recommended for self catheterization which she did not wish to do  The patient also admits to having episodes of leaking urine over the past 2 years and also noticing foul smell when she urinates  Objective:  Vitals:  Temp:  [97 8 °F (36 6 °C)-98 5 °F (36 9 °C)] 98 5 °F (36 9 °C)  HR:  [53-61] 57  Resp:  [16-18] 16  BP: (163-182)/(56-64) 181/64  SpO2:  [92 %-98 %] 96 %  Temp (24hrs), Av 1 °F (36 7 °C), Min:97 8 °F (36 6 °C), Max:98 5 °F (36 9 °C)  Current: Temperature: 98 5 °F (36 9 °C)    Physical Exam:   General Appearance:  Alert, interactive, nontoxic, no acute distress  She is chronically ill-appearing and will often forget various points in her history  Throat: Oropharynx moist without lesions  Severely poor dentition noted  Lungs:   Clear to auscultation bilaterally; no wheezes, rhonchi or rales; respirations unlabored on room air   Heart:  RRR; no murmur, rub or gallop   Abdomen:   Soft, non-tender, non-distended, positive bowel sounds  Extremities: No clubbing, cyanosis or edema   Skin: No new rashes or lesions   No New draining wounds noted  Labs, Imaging, & Other studies:   All pertinent labs and imaging studies were personally reviewed  Results from last 7 days   Lab Units 11/07/19  0453 11/06/19  1120 11/06/19  0508 11/05/19  1204   WBC Thousand/uL 5 02  --  5 53 5 94   HEMOGLOBIN g/dL 7 1*  --  7 1* 7 5*   PLATELETS Thousands/uL 168 161 169 173     Results from last 7 days   Lab Units 11/07/19  0513  11/05/19  1204 11/04/19  0713   POTASSIUM mmol/L 3 8   < > 4 5 4 4   CHLORIDE mmol/L 107   < > 118* 116*   CO2 mmol/L 27   < > 15* 16*   BUN mg/dL 37*   < > 50* 47*   CREATININE mg/dL 2 62*   < > 2 86* 2 76*   EGFR ml/min/1 73sq m 20   < > 18 19   CALCIUM mg/dL 8 1*   < > 8 7 8 5   AST U/L  --   --  10 15   ALT U/L  --   --  18 24   ALK PHOS U/L  --   --  111 102    < > = values in this interval not displayed       Results from last 7 days   Lab Units 11/05/19  1515 11/04/19  0713   URINE CULTURE  No Growth <1000 cfu/mL <10,000 cfu/ml

## 2019-11-07 NOTE — ASSESSMENT & PLAN NOTE
Lab Results   Component Value Date    HGBA1C 5 1 09/09/2019       Recent Labs     11/06/19  2101 11/07/19  0709 11/07/19  1108 11/07/19  1612   POCGLU 145* 123 134 146*       Blood Sugar Average: Last 72 hrs:  (P) 331 [id number] Patient takes toujeo 1 unit qhs  Monitor  Blood sugar acceptable  History of pancreatic transplant

## 2019-11-07 NOTE — CONSULTS
UROLOGY CONSULTATION NOTE     Patient Identifiers: Grace Elder (MRN [id number])  Service Requesting Consultation:  [facility] Internal Medicine  Service Providing Consultation:  Urology, Macie Vasquez MD    Date of Service: 11/7/2019  Consults    Reason for Consultation:  Recurrent UTI    History of Present Illness: Grace Elder is a 53 y o  old with a history of renal transplant, 21 years ago, placed in her left lower quadrant given that she also had a pancreas transplant concurrently  She does have a history of severe constipation, only going once a week or so, this is a very long problem for her, also she has a multiple year history of recurrent UTI  She does not appear to have impressive hydronephrosis around her allograft, again, that is seen in her left lower quadrant  She does state that ever since was placed she has some slight tenderness over the allograft with palpation  She has not been seeing her transplant team regularly, she does take her immunosuppressive medications as prescribed, she does follow with Nephrology regularly, however  Denies other associated symptoms, has no aggravating or alleviating factors  Does take Dulcolax at home for constipation  Tells me that the plan was for chemotherapy for multiple myeloma, however this has been complicated by her transplant, per her report  Denies ever having other urologic surgeries, however the medical record does make mention of her undergoing endoscopic procedures previously, and also undergoing a transplant nephrectomy  Unclear if this occurred per patient report, she states that she has her original transplant at this time (of note, most transplants are typically placed in the right lower quadrant, however she states this was placed in the left lower quadrant due to combined kidney and transplant)    No family history of urologic malignancy or malady    On her cross-sectional imaging she does appear to have her native kidneys which are quite atrophic bilaterally      Past Medical, Past Surgical History:     Past Medical History:   Diagnosis Date    Abnormal liver function test     [facility] [address] [facility]     Acute on chronic congestive heart failure (HCC)     Allergic urticaria     Anemia     Cancer (HCC)     Multiple myeloma    Cervical dysplasia     Cholelithiasis     Chronic diastolic (congestive) heart failure ([facility] [address] ) 9/18/2017    Diabetes mellitus ([facility] [address] )     Previous, controlled with diet    Diabetes mellitus with foot ulcer ([facility] [address] )     Disease of thyroid gland     Encephalopathy     Hematuria     + leak est- secondary to UTIs/panc drainage    History of transfusion     Hyperkalemia     Hypertension     Iliotibial band syndrome     Lumbar radiculopathy     Multiple myeloma (HCC)     Multiple myeloma ([facility] [address] [facility]     Night blindness     Nonrheumatic aortic (valve) insufficiency     Pneumonia     Renal disorder     Retinopathy     Seborrhea     Seizure ([facility] [address] [facility]     Shingles     Sinus tachycardia     B blocker - cardio echo stress test 02 normal/neg LE doppler 2/02 OK and 12/07    Status post simultaneous kidney and pancreas transplant ([facility] [address] )     Toe amputation status     Trochanteric bursitis    :    Past Surgical History:   Procedure Laterality Date    CATARACT EXTRACTION      CHOLECYSTECTOMY      COLONOSCOPY      two polyps in the rectum removed and biopsied diverticulosis in the sigmoid colon, external hemorrhoiods- Dr Barfield    COMBINED KIDNEY-PANCREAS TRANSPLANT N/A     CT BONE MARROW BIOPSY AND ASPIRATION  4/17/2019    CYSTOSCOPY N/A 10/13/2016    Procedure: CYSTOSCOPY, retrograde pyelogram, biopsy of ureteral polyp; Surgeon: Waylon Cherry MD;  Location: [facility];  Service:    [facility] DILATION AND CURETTAGE OF UTERUS      ESOPHAGOGASTRODUODENOSCOPY N/A 11/20/2017    Procedure: ESOPHAGOGASTRODUODENOSCOPY (EGD); Surgeon: Elvis Neely DO;  Location: [facility];   Service: Gastroenterology    EYE SURGERY      cataracts    FOOT AMPUTATION THROUGH METATARSAL Left     FOOT SURGERY Right     excision of metatarsal heads    HALLUX VALGUS CORRECTION Right     NEPHRECTOMY TRANSPLANTED ORGAN      PANCREATIC TRANSPLANT REMOVAL  1998   :     Medications, Allergies:     Current Facility-Administered Medications   Medication Dose Route Frequency    acetaminophen (TYLENOL) tablet 650 mg  650 mg Oral Q6H PRN    amLODIPine (NORVASC) tablet 10 mg  10 mg Oral QAM    ARIPiprazole (ABILIFY) tablet 30 mg  30 mg Oral HS    aspirin chewable tablet 81 mg  81 mg Oral Daily    busPIRone (BUSPAR) tablet 5 mg  5 mg Oral BID    cefpodoxime (VANTIN) tablet 400 mg  400 mg Oral Daily With Lunch    cholecalciferol (VITAMIN D3) tablet 3,000 Units  3,000 Units Oral Daily    cloNIDine (CATAPRES) tablet 0 3 mg  0 3 mg Oral Q8H [address]    doxazosin (CARDURA) tablet 4 mg  4 mg Oral HS    DULoxetine (CYMBALTA) delayed release capsule 60 mg  60 mg Oral Daily    ferrous sulfate tablet 325 mg  325 mg Oral Daily With Breakfast    folic acid (FOLVITE) tablet 1,000 mcg  1,000 mcg Oral Daily    heparin (porcine) subcutaneous injection 5,000 Units  5,000 Units Subcutaneous Q8H [address]    hydrALAZINE (APRESOLINE) tablet 50 mg  50 mg Oral Q8H [address]    insulin glargine (LANTUS) subcutaneous injection 5 Units 0 05 mL  5 Units Subcutaneous HS    insulin lispro (HumaLOG) 100 units/mL subcutaneous injection 1-5 Units  1-5 Units Subcutaneous TID AC    insulin lispro (HumaLOG) 100 units/mL subcutaneous injection 1-5 Units  1-5 Units Subcutaneous HS    levothyroxine tablet 125 mcg  125 mcg Oral Early Morning    LORazepam (ATIVAN) tablet 1 mg  1 mg Oral Q8H PRN    metoprolol tartrate (LOPRESSOR) tablet 50 mg  50 mg Oral Q12H ARUNA    ondansetron (ZOFRAN) injection 4 mg  4 mg Intravenous Q6H PRN    pravastatin (PRAVACHOL) tablet 80 mg  80 mg Oral Daily With Dinner    predniSONE tablet 5 mg  5 mg Oral Daily    rOPINIRole (REQUIP) tablet 0 25 mg  0 25 mg Oral BID    sertraline (ZOLOFT) tablet 200 mg  200 mg Oral Daily    sevelamer (RENAGEL) tablet 800 mg  800 mg Oral TID With Meals    sodium bicarbonate tablet 1,300 mg  1,300 mg Oral TID    tacrolimus (PROGRAF) capsule 2 mg  2 mg Oral QPM    tacrolimus (PROGRAF) capsule 3 mg  3 mg Oral Daily       Allergies: Allergies   Allergen Reactions    Ixazomib Rash     Ninlaro    Revlimid [Lenalidomide] Rash    Cefadroxil     Latex Rash    Morphine Other (See Comments)     Other reaction(s): Other (See Comments)  projectile vomiting    Morphine And Related GI Intolerance    Myrbetriq [Mirabegron] Hives    Penicillins Hives     Other reaction(s): Other (See Comments)  Respiratory Distress,hives  Tolerates cefazolin   :    Social and Family History:   Social History:   Social History     Tobacco Use    Smoking status: Former Smoker     Last attempt to quit: 2012     Years since quittin 5    Smokeless tobacco: Never Used   Substance Use Topics    Alcohol use: Never     Frequency: Never     Binge frequency: Never     Comment: (history)    Drug use: Never     Types: Hydrocodone     Comment: Past cocaine use many years ago - no current use     Social History     Tobacco Use   Smoking Status Former Smoker    Last attempt to quit: 2012    Years since quittin 5   Smokeless Tobacco Never Used       Family History:  Family History   Problem Relation Age of Onset    Hypertension Mother     Cancer Mother     Hypertension Father     Cancer Father     Cancer Maternal Grandfather     Cancer Paternal Grandmother     Cancer Paternal Grandfather     Depression Sister     Breast cancer Maternal Grandmother 77   :     Review of Systems:     General: Fever, chills, or night sweats: negative  Cardiac: Negative for chest pain  Pulmonary: Negative for shortness of breath  Gastrointestinal: Abdominal pain positive  Nausea, vomiting, or diarrhea negative,  Genitourinary: See HPI above    Patient does not have hematuria  All other systems were queried and were negative except as listed above in HPI and here in the ROS  Physical Exam:   /61   Pulse 61   Temp 97 9 °F (36 6 °C)   Resp 16   Ht 5' 8" (1 727 m)   Wt 98 kg (216 lb)   LMP  (LMP Unknown)   SpO2 94%   BMI 32 84 kg/m² Temp (24hrs), Av 1 °F (36 7 °C), Min:97 9 °F (36 6 °C), Max:98 5 °F (36 9 °C)  current; Temperature: 97 9 °F (36 6 °C)  I/O last 24 hours: In: 65 [P O :640; I V :228; IV Piggyback:47]  Out: 5400 [Urine:5400]  General: Patient is pleasant and in NAD  Awake and alert    Cardiac: Peripheral edema: negative, peripheral pulses are present and indicated a regular rate and rhythm    Pulmonary: Non-labored breathing, speaking in full sentences, no wheezing, no coughing    Abdomen: Soft, non-tender, non-distended  no hernias    Genitourinary: Negative CVA tenderness, positive suprapubic tenderness, mostly over the left lower quadrant, over the area of the allograft, patient states that this is a chronic finding for her      Neurological: Cranial nerves II-XII are intact, no sensory deficits, no neurological deficits    Musculoskeletal: Extremities are warm, ROM is limited    Psychiatric: The patient's train of thought is linear and logical, mood and affect are normal    Lymphatic: There is not adenopathy in the abdominal region      Skin:  Skin changes of aging and chronic kidney disease are present    RAMIREZ: none        Labs:     Lab Results   Component Value Date    HGB 7 1 (L) 2019    HCT 23 2 (L) 2019    WBC 5 02 2019     2019   ]    Lab Results   Component Value Date     (L) 2016    K 3 8 2019     2019    CO2 27 2019    BUN 37 (H) 2019    CREATININE 2 62 (H) 2019    CALCIUM 8 1 (L) 2019    GLUCOSE 103 2017   ]    Imaging:   I personally reviewed the images and report of the following studies, and reviewed them with the patient:    CT Abdomen/Pelvis: Bilateral atrophic native kidneys, left allograft is present in the lower quadrant      ASSESSMENT:     53 y o  old female with  multiple medical issues, history of chronic kidney disease status post transplantation, also with history of recurrent urinary tract infections  She has been seen by the Urology service multiple times at this hospital for this previously  I think that most of her issue with urinary retention is due to severe constipation, spoke with her about the need for a fiber supplement as well as MiraLax in addition to Dulcolax, and that in patients who do follow a aggressive, regular bowel regimen such as this that their bladder emptying often improves and so will their history of recurrent infections  I do think that it is prudent to set her up for clean intermittent catheterization teaching, and she can perform this a number of times per day to decrease the residual urine within her bladder and decrease her chance of infection  If this is not fruitful, she can be placed on a low-dose of nitrofurantoin prior to bed for prophylaxis against recurrent UTI  She requires no inpatient intervention at this time    PLAN:     Continued management per primary team     Patient may be straight catheterized as necessary while inpatient  We have sent a task to our office to arrange for clean intermittent catheterization teaching  Recommend discharge on an aggressive bowel regimen of a fiber supplement, Dulcolax, and MiraLax daily, it is hopeful that this will help with the patient's troubles with dysfunctional elimination syndrome which are contributing to her recurrent infections  No plan for operative intervention at this time      Urology signing off, please re-consult as necessary    The following portions of the patient's history were reviewed and updated as appropriate: allergies, current medications, past family history, past medical history, past social history, past surgical history and problem list     Portions of the above record have been created with voice recognition software  Occasional wrong word or "sound alike" substitution may have occurred due to the inherent limitations of voice recognition software  Read the chart carefully and recognize, using context, where substitution may have occurred  Thank you for allowing me to participate in this patients care  Please do not hesitate to call with any additional questions    Loretta Barreto MD

## 2019-11-07 NOTE — ASSESSMENT & PLAN NOTE
Lab Results   Component Value Date    HGBA1C 5 1 09/09/2019       Recent Labs     11/05/19  2042 11/06/19  0731 11/06/19  1124 11/06/19  1614   POCGLU 234* 122 111 131       Blood Sugar Average: Last 72 hrs:  (P) 146 8 Patient takes toujeo 1 unit qhs  Monitor

## 2019-11-07 NOTE — ASSESSMENT & PLAN NOTE
Patient with history of recurrent UTI  She reported dysuria and urinary frequency  Started on Keflex outpatient 4 days ago with mild improvement in her dysuria  Antibiotics per Infectious Disease-continue with ceftriaxone  Dysuria improving

## 2019-11-07 NOTE — ASSESSMENT & PLAN NOTE
With metabolic acidosis, suspect prerenal and possibility of AIN from use of Revlimid  Patient is status post kidney and pancreatic transplant in 1998,  chronic kidney disease stage 4,   Received bicarb drip-off of IV fluids now  Ultrasound reviewed  Nephrology on board  CT abdomen reviewed-  Creatinine improving-baseline 2 5

## 2019-11-07 NOTE — PROGRESS NOTES
NEPHROLOGY PROGRESS NOTE    Grace Elder 53 y o  female MRN: [id number]  Unit/Bed#: [facility] 834-01 Encounter: [id number]  Reason for Consult:  Acute on chronic kidney disease    Patient is awake alert still seems a little lethargic and sleepy and feels very weak  Other than that no significant changes overnight no fevers or chills  ASSESSMENT/PLAN:  1  Renal    Patient has acute on chronic kidney disease baseline creatinine is around 2 5  She has history of renal transplantation and is on immunosuppression with tacrolimus and prednisone  Her tacrolimus dose was adjusted back to her usual outpatient dose and a recent trough level was in the therapeutic range  Creatinine slightly lower on IV fluids continue treatment with volume support and immunosuppression and no changes  Monitor off IV fluids  BMP a m  Continue current immunosuppressive medications    2  Metabolic acidosis    Patient history of pancreatic transplant that has failed its endocrine function but still has excellent secretions that will be eliminated in the bladder  This will lead to bicarbonate losses and chronic metabolic acidosis  With bicarbonate in IV fluids this has corrected to normal will monitor off IV fluids on oral bicarbonate therapy which she is on as an outpatient  3  Presumed urinary tract infection    Cultures were negative when she was hospitalized but she was started on cephalexin as an outpatient so if there was not infection this may be why nothing grew  She has been placed on some oral antibiotics per Infectious Disease and will follow  4  History of multiple myeloma  This likely contributes to anemia as well as her chronic kidney disease  SUBJECTIVE:  Review of Systems   Constitution: Positive for malaise/fatigue  Negative for chills, fever and night sweats  HENT: Negative  Eyes: Negative  Cardiovascular: Negative  Negative for chest pain, dyspnea on exertion, orthopnea and palpitations  Respiratory: Negative  Negative for cough, shortness of breath and wheezing  Gastrointestinal: Negative  Negative for abdominal pain, diarrhea, nausea and vomiting  Genitourinary: Negative  Neurological: Positive for weakness  Negative for dizziness, focal weakness and light-headedness  Psychiatric/Behavioral: Negative  OBJECTIVE:  Current Weight: Weight - Scale: 98 kg (216 lb)  Vitals:Temp (24hrs), Av 1 °F (36 7 °C), Min:97 8 °F (36 6 °C), Max:98 5 °F (36 9 °C)  Current: Temperature: 98 5 °F (36 9 °C)   Blood pressure (!) 181/64, pulse 57, temperature 98 5 °F (36 9 °C), resp  rate 16, height 5' 8" (1 727 m), weight 98 kg (216 lb), SpO2 96 %  , Body mass index is 32 84 kg/m²  Intake/Output Summary (Last 24 hours) at 2019 1106  Last data filed at 2019 0900  Gross per 24 hour   Intake 915 ml   Output 4700 ml   Net -3785 ml       Physical Exam: BP (!) 181/64   Pulse 57   Temp 98 5 °F (36 9 °C)   Resp 16   Ht 5' 8" (1 727 m)   Wt 98 kg (216 lb)   LMP  (LMP Unknown)   SpO2 96%   BMI 32 84 kg/m²   Physical Exam   Constitutional: She is oriented to person, place, and time  No distress  Eyes: EOM are normal  No scleral icterus  Neck: Neck supple  No JVD present  Cardiovascular: Normal rate and regular rhythm  Exam reveals no gallop and no friction rub  Pulmonary/Chest: Effort normal and breath sounds normal  No respiratory distress  She has no wheezes  She has no rales  Abdominal: Soft  Bowel sounds are normal  She exhibits no distension  There is no tenderness  There is no rebound  Nontender renal allograft  Neurological: She is alert and oriented to person, place, and time         Medications:    Current Facility-Administered Medications:     acetaminophen (TYLENOL) tablet 650 mg, 650 mg, Oral, Q6H PRN, Desire Boyd MD, 650 mg at 19 1329    amLODIPine (NORVASC) tablet 10 mg, 10 mg, Oral, QAM, Adenike Jeffries DO, 10 mg at 19 0808    ARIPiprazole (ABILIFY) tablet 30 mg, 30 mg, Oral, HS, Tosin Forge, DO, 30 mg at 11/06/19 2213    aspirin chewable tablet 81 mg, 81 mg, Oral, Daily, Tosin Forge, DO, 81 mg at 11/07/19 0808    busPIRone (BUSPAR) tablet 5 mg, 5 mg, Oral, BID, Tosin Forge, DO, 5 mg at 11/07/19 0808    cefpodoxime (VANTIN) tablet 400 mg, 400 mg, Oral, Daily With Lunch, Loretha Litten, MD    cholecalciferol (VITAMIN D3) tablet 3,000 Units, 3,000 Units, Oral, Daily, Tosin Forge, DO, 3,000 Units at 11/07/19 0808    cloNIDine (CATAPRES) tablet 0 3 mg, 0 3 mg, Oral, Q8H [address], Tosin Polancoe, DO, 0 3 mg at 11/07/19 0527    doxazosin (CARDURA) tablet 4 mg, 4 mg, Oral, HS, Tosin Forge, DO, 4 mg at 11/06/19 2210    DULoxetine (CYMBALTA) delayed release capsule 60 mg, 60 mg, Oral, Daily, Tosin Forge, DO, 60 mg at 11/07/19 0808    ferrous sulfate tablet 325 mg, 325 mg, Oral, Daily With Breakfast, Tosin Forge, DO, 325 mg at 19/13/88 6866    folic acid (FOLVITE) tablet 1,000 mcg, 1,000 mcg, Oral, Daily, Tosin Forge, DO, 1,000 mcg at 11/07/19 0808    heparin (porcine) subcutaneous injection 5,000 Units, 5,000 Units, Subcutaneous, Q8H [address], 5,000 Units at 11/07/19 0528 **AND** [COMPLETED] Platelet count, , , Once, Tosin Bobo DO    hydrALAZINE (APRESOLINE) tablet 50 mg, 50 mg, Oral, Q8H [address], Tosin Forge, DO, 50 mg at 11/07/19 0527    insulin glargine (LANTUS) subcutaneous injection 5 Units 0 05 mL, 5 Units, Subcutaneous, HS, [place] Polancoe, DO, 5 Units at 11/06/19 2210    insulin lispro (HumaLOG) 100 units/mL subcutaneous injection 1-5 Units, 1-5 Units, Subcutaneous, TID AC **AND** Fingerstick Glucose (POCT), , , TID AC, Tosin Bobo DO    insulin lispro (HumaLOG) 100 units/mL subcutaneous injection 1-5 Units, 1-5 Units, Subcutaneous, HS, Tosin Bobo DO, 2 Units at 11/05/19 2237    levothyroxine tablet 125 mcg, 125 mcg, Oral, Early Morning, Tosin Bobo DO, 125 mcg at 11/07/19 0527    LORazepam (ATIVAN) tablet 1 mg, 1 mg, Oral, Q8H PRN, Lizette Colace, DO, 1 mg at 11/07/19 0856    metoprolol tartrate (LOPRESSOR) tablet 50 mg, 50 mg, Oral, Q12H [facility], Lizette Colace, DO, 50 mg at 11/07/19 0808    ondansetron (ZOFRAN) injection 4 mg, 4 mg, Intravenous, Q6H PRN, Jailyn Rhodes, PA-C, 4 mg at 11/06/19 2229    pravastatin (PRAVACHOL) tablet 80 mg, 80 mg, Oral, Daily With Neyda Noun, DO, 80 mg at 11/06/19 1745    predniSONE tablet 5 mg, 5 mg, Oral, Daily, Lizette Colace, DO, 5 mg at 11/07/19 0808    rOPINIRole (REQUIP) tablet 0 25 mg, 0 25 mg, Oral, BID, Lizette Colace, DO, 0 25 mg at 11/07/19 0808    sertraline (ZOLOFT) tablet 200 mg, 200 mg, Oral, Daily, Lizette Colace, DO, 200 mg at 11/07/19 0808    sevelamer (RENAGEL) tablet 800 mg, 800 mg, Oral, TID With Meals, Lizette Colace, DO, 800 mg at 11/07/19 0808    sodium bicarbonate 150 mEq in dextrose 5 % 1,000 mL infusion, 80 mL/hr, Intravenous, Continuous, Anderson Tse MD, Last Rate: 80 mL/hr at 11/07/19 0814, 80 mL/hr at 11/07/19 0814    sodium bicarbonate tablet 1,300 mg, 1,300 mg, Oral, TID, Lizette Colace, DO, 1,300 mg at 11/07/19 0808    tacrolimus (PROGRAF) capsule 2 mg, 2 mg, Oral, QPM, Anderson Tse MD, 2 mg at 11/06/19 2213    tacrolimus (PROGRAF) capsule 3 mg, 3 mg, Oral, Daily, Anderson Tse MD, 3 mg at 11/07/19 3051    Laboratory Results:  Lab Results   Component Value Date    WBC 5 02 11/07/2019    HGB 7 1 (L) 11/07/2019    HCT 23 2 (L) 11/07/2019    MCV 98 11/07/2019     11/07/2019     Lab Results   Component Value Date    SODIUM 139 11/07/2019    K 3 8 11/07/2019     11/07/2019    CO2 27 11/07/2019    BUN 37 (H) 11/07/2019    CREATININE 2 62 (H) 11/07/2019    GLUC 115 11/07/2019    CALCIUM 8 1 (L) 11/07/2019     Lab Results   Component Value Date    CALCIUM 8 1 (L) 11/07/2019    PHOS 3 8 11/05/2019     No results found for: LABPROT

## 2019-11-07 NOTE — TELEPHONE ENCOUNTER
Violet Small is a 26-year-old female seen in consultation at [facility] for history of renal--pancreatic transplantation, recurrent E coli UTI and urinary retention status post Moreno catheter placement 11/7  Patient will be discharged with Moreno catheter in place  Please contact patient for hospital follow-up to include void trial   Discussion should ensue requiring potential role of cystoscopy and/or urodynamic study in the near future  Patient should be seen in approximately 3 weeks  Patient wishes to be seen at [facility]  Thank you

## 2019-11-07 NOTE — ASSESSMENT & PLAN NOTE
Patient with underlying chronic anemia  Monitor  Hemoglobin is down to 7 1  Will monitor may need transfusion if drops below 7, baseline hemoglobin is close to 7 5  Likely secondary to CKD/multiple myeloma

## 2019-11-07 NOTE — CONSULTS
UROLOGY CONSULTATION NOTE     Patient Identifiers: Marco Cardozo (MRN [id number])  Service Requesting Consultation: Nikolai Shields MD    Service Providing Consultation:  Urology, Miryam Cerda, Anitha Wilder    Date of Service: 11/7/2019  Consults    Reason for Consultation: UTI    ASSESSMENT:     53 y o  old female with  type 1 diabetes , end-stage renal disease status post renal--pancreatic transplantation , recurrent E coli UTI and urinary retention       PLAN:   Continue medical optimization, symptom management and antibiotics  Appreciate interdisciplinary management of complex medical patient  Insert Moreno catheter and maintain to straight drainage  Patient will require further workup once recovered and infection free  May includes cystoscopic examination and/or urodynamics  Patient may require learning CIC as alternative to indwelling Moreno  Our service will contact patient with hospital follow-up appointment date and time  History of Present Illness: Marco Cardozo is a 53 y o  old female with history of type 1 diabetes, end-stage renal disease status post renal--creatinine transplantation in 1998 admitted for metabolic encephalopathy, acute renal failure and urinary tract infection  She reports longstanding history of sensation of incomplete bladder emptying and occasional incontinence, but states I did not want to talk about  my bathroom issues    Ms Mcclendon reports that she has seen urologists remotely but not in many years    She developed dysuria several days ago and was prescribed Keflex as an outpatient  She presented to the emergency room with confusion and poor oral intake  She remains afebrile and hemodynamically stable  Symptoms are improving with IV antibiotics and she was recently transition to oral cefpodoxime by Infectious Disease  Review of electronic medical record demonstrates multiple urine cultures positive for E coli since 2017   Of note, she is on chronic immunosuppressive therapy for post transplant management  Two repeat urine cultures were obtained 1 was contaminated and the 2nd without growth of organism  CT of the abdomen and pelvis demonstrated needed for renal atrophy without hydronephrosis and left renal transplant without allograft hydronephrosis or calculus  Bladder was distended on imaging with evidence of diverticula with coarse calcification in changes consistent with previous transplant surgery and drainage into bladder  Urologic consultation is requested for further management recommendations      Past Medical, Past Surgical History:     Past Medical History:   Diagnosis Date    Abnormal liver function test     Acute kidney injury ([address] )     Acute on chronic congestive heart failure (HCC)     Allergic urticaria     Anemia     Cancer (HCC)     Multiple myeloma    Cervical dysplasia     Cholelithiasis     Chronic diastolic (congestive) heart failure ([address] ) 9/18/2017    Diabetes mellitus ([address] )     Previous, controlled with diet    Diabetes mellitus with foot ulcer ([address] )     Disease of thyroid gland     Encephalopathy     Hematuria     + leak est- secondary to UTIs/panc drainage    History of transfusion     Hyperkalemia     Hypertension     Iliotibial band syndrome     Lumbar radiculopathy     Multiple myeloma (HCC)     Multiple myeloma (HCC)     Night blindness     Nonrheumatic aortic (valve) insufficiency     Pneumonia     Renal disorder     Retinopathy     Seborrhea     Seizure ([address] )     Shingles     Sinus tachycardia     B blocker - cardio echo stress test 02 normal/neg LE doppler 2/02 OK and 12/07    Status post simultaneous kidney and pancreas transplant ([address] )     Toe amputation status     Trochanteric bursitis    :    Past Surgical History:   Procedure Laterality Date    CATARACT EXTRACTION      CHOLECYSTECTOMY      COLONOSCOPY      two polyps in the rectum removed and biopsied diverticulosis in the sigmoid colon, external hemorrhoiods- Dr Barfield    COMBINED KIDNEY-PANCREAS TRANSPLANT N/A     CT BONE MARROW BIOPSY AND ASPIRATION  4/17/2019    CYSTOSCOPY N/A 10/13/2016    Procedure: CYSTOSCOPY, retrograde pyelogram, biopsy of ureteral polyp; Surgeon: Garret Payne MD;  Location: [facility];  Service:    [facility] DILATION AND CURETTAGE OF UTERUS      ESOPHAGOGASTRODUODENOSCOPY N/A 11/20/2017    Procedure: ESOPHAGOGASTRODUODENOSCOPY (EGD); Surgeon: Valeriano Gibson DO;  Location: [facility]; Service: Gastroenterology    EYE SURGERY      cataracts    FOOT AMPUTATION THROUGH METATARSAL Left     FOOT SURGERY Right     excision of metatarsal heads    HALLUX VALGUS CORRECTION Right     NEPHRECTOMY TRANSPLANTED ORGAN      PANCREATIC TRANSPLANT REMOVAL  1998   :     Medications, Allergies:     Current Facility-Administered Medications   Medication Dose Route Frequency    acetaminophen (TYLENOL) tablet 650 mg  650 mg Oral Q6H PRN    amLODIPine (NORVASC) tablet 10 mg  10 mg Oral QAM    ARIPiprazole (ABILIFY) tablet 30 mg  30 mg Oral HS    aspirin chewable tablet 81 mg  81 mg Oral Daily    busPIRone (BUSPAR) tablet 5 mg  5 mg Oral BID    cefpodoxime (VANTIN) tablet 400 mg  400 mg Oral Daily With Lunch    cholecalciferol (VITAMIN D3) tablet 3,000 Units  3,000 Units Oral Daily    cloNIDine (CATAPRES) tablet 0 3 mg  0 3 mg Oral Q8H [address]    doxazosin (CARDURA) tablet 4 mg  4 mg Oral HS    DULoxetine (CYMBALTA) delayed release capsule 60 mg  60 mg Oral Daily    ferrous sulfate tablet 325 mg  325 mg Oral Daily With Breakfast    folic acid (FOLVITE) tablet 1,000 mcg  1,000 mcg Oral Daily    heparin (porcine) subcutaneous injection 5,000 Units  5,000 Units Subcutaneous Q8H [address]    hydrALAZINE (APRESOLINE) tablet 50 mg  50 mg Oral Q8H [address]    insulin glargine (LANTUS) subcutaneous injection 5 Units 0 05 mL  5 Units Subcutaneous HS    insulin lispro (HumaLOG) 100 units/mL subcutaneous injection 1-5 Units  1-5 Units Subcutaneous TID AC    insulin lispro (HumaLOG) 100 units/mL subcutaneous injection 1-5 Units  1-5 Units Subcutaneous HS    levothyroxine tablet 125 mcg  125 mcg Oral Early Morning    LORazepam (ATIVAN) tablet 1 mg  1 mg Oral Q8H PRN    metoprolol tartrate (LOPRESSOR) tablet 50 mg  50 mg Oral Q12H [address]    ondansetron (ZOFRAN) injection 4 mg  4 mg Intravenous Q6H PRN    pravastatin (PRAVACHOL) tablet 80 mg  80 mg Oral Daily With Dinner    predniSONE tablet 5 mg  5 mg Oral Daily    rOPINIRole (REQUIP) tablet 0 25 mg  0 25 mg Oral BID    sertraline (ZOLOFT) tablet 200 mg  200 mg Oral Daily    sevelamer (RENAGEL) tablet 800 mg  800 mg Oral TID With Meals    sodium bicarbonate tablet 1,300 mg  1,300 mg Oral TID    tacrolimus (PROGRAF) capsule 2 mg  2 mg Oral QPM    tacrolimus (PROGRAF) capsule 3 mg  3 mg Oral Daily       Allergies: Allergies   Allergen Reactions    Ixazomib Rash     Ninlaro    Revlimid [Lenalidomide] Rash    Cefadroxil     Latex Rash    Morphine Other (See Comments)     Other reaction(s): Other (See Comments)  projectile vomiting    Morphine And Related GI Intolerance    Myrbetriq [Mirabegron] Hives    Penicillins Hives     Other reaction(s): Other (See Comments)  Respiratory Distress,hives  Tolerates cefazolin   :    Social and Family History:   Social History:   Social History     Tobacco Use    Smoking status: Former Smoker     Last attempt to quit: 2012     Years since quittin 5    Smokeless tobacco: Never Used   Substance Use Topics    Alcohol use: Never     Frequency: Never     Binge frequency: Never     Comment: (history)    Drug use: Never     Types: Hydrocodone     Comment: Past cocaine use many years ago - no current use         Social History     Tobacco Use   Smoking Status Former Smoker    Last attempt to quit: 2012    Years since quittin 5   Smokeless Tobacco Never Used       Family History:  Family History   Problem Relation Age of Onset    Hypertension Mother    Jeff Licona Cancer Mother     Hypertension Father     Cancer Father     Cancer Maternal Grandfather     Cancer Paternal Grandmother     Cancer Paternal Grandfather     Depression Sister     Breast cancer Maternal Grandmother 77   :     Review of Systems:     Review of Systems - History obtained from chart review and the patient  General ROS: negative for - chills or fever  Cardiovascular ROS: no chest pain or dyspnea on exertion  Gastrointestinal ROS: no abdominal pain, change in bowel habits, or black or bloody stools  Genito-Urinary ROS: positive for - dysuria  negative for - hematuria, pelvic pain or urinary frequency/urgency  Neurological ROS: no TIA or stroke symptoms    All other systems queried were negative  Physical Exam:   General: Patient is pleasant and in NAD  Awake and alert  /61   Pulse 61   Temp 97 9 °F (36 6 °C)   Resp 16   Ht 5' 8" (1 727 m)   Wt 98 kg (216 lb)   LMP  (LMP Unknown)   SpO2 94%   BMI 32 84 kg/m² Temp (24hrs), Av 1 °F (36 7 °C), Min:97 9 °F (36 6 °C), Max:98 5 °F (36 9 °C)  current; Temperature: 97 9 °F (36 6 °C)  I/O last 24 hours:   In: 65 [P O :640; I V :228; IV Piggyback:47]  Out: 5400 [Urine:5400]    General appearance: alert and oriented, in no acute distress, appears older than stated age, cooperative, no distress and moderately obese  Head: Normocephalic, without obvious abnormality, atraumatic  Neck: no adenopathy, no carotid bruit, no JVD, supple, symmetrical, trachea midline and thyroid not enlarged, symmetric, no tenderness/mass/nodules  Lungs: diminished breath sounds  Heart: regular rate and rhythm, S1, S2 normal, no murmur, click, rub or gallop  Abdomen: soft, non-tender; bowel sounds normal; no masses,  no organomegaly  Extremities: extremities normal, warm and well-perfused; no cyanosis, clubbing, or edema  Pulses: 2+ and symmetric  Neurologic: Grossly normal  No urinary drains    Labs:     Lab Results   Component Value Date    HGB 7 1 (L) 2019 HCT 23 2 (L) 11/07/2019    WBC 5 02 11/07/2019     11/07/2019   ]    Lab Results   Component Value Date     (L) 01/06/2016    K 3 8 11/07/2019     11/07/2019    CO2 27 11/07/2019    BUN 37 (H) 11/07/2019    CREATININE 2 62 (H) 11/07/2019    CALCIUM 8 1 (L) 11/07/2019    GLUCOSE 103 09/13/2017   ]    Imaging:   I personally reviewed the images and report of the following studies, and reviewed them with the patient:    CT Abdomen: See below  CT abdomen pelvis wo contrast [[id number]] Collected: 11/06/19 1317   Order Status: Completed Updated: 11/06/19 2921   Narrative:     CT ABDOMEN AND PELVIS WITHOUT IV CONTRAST    INDICATION:   recurrent UTI in renal transplant patient, r/o intra-abdominal pathology   Status post cholecystectomy; history of pancreatic transplant removal    COMPARISON:  2/2/2018    TECHNIQUE:  CT examination of the abdomen and pelvis was performed without intravenous contrast   Axial, sagittal, and coronal 2D reformatted images were created from the source data and submitted for interpretation  Radiation dose length product (DLP) for this visit:  1153 7 mGy-cm    This examination, like all CT scans performed in the [facility], was performed utilizing techniques to minimize radiation dose exposure, including the use of iterative   reconstruction and automated exposure control  Enteric contrast was administered  FINDINGS:    ABDOMEN    LOWER CHEST:  No clinically significant abnormality identified in the visualized lower chest   Small pericardial effusion or thickening is slightly improved is similar to the prior study  LIVER/BILIARY TREE:  Unremarkable  GALLBLADDER:  The patient is status post cholecystectomy  SPLEEN:  Unremarkable  PANCREAS:  Unremarkable  ADRENAL GLANDS:  Unremarkable  KIDNEYS/URETERS:  Atrophic native kidneys are identified   No hydronephrosis  STOMACH AND BOWEL:  Unremarkable      APPENDIX:  The appendix is not well seen  ABDOMINOPELVIC CAVITY:  No ascites or free intraperitoneal air   Mild retroperitoneal adenopathy appears unchanged with one node measuring up to 1 2 cm in size on image 50   Small pelvic nodes are similar to the prior study  Left renal transplant is seen with probable low density cysts seen laterally which is unchanged   There is no transplant hydronephrosis seen or calculus   There is slight infiltration of the fat surrounding the transplant as compared to some of the prior   examinations  Soft tissue extending in the right lower quadrant leading to some higher density suture material probably represents changes from prior pancreatic transplant   This tissue is similar to prior studies   Surgical sutures appear to abut a bladder   diverticulum which probably represents a segment of transplanted duodenum representing anastomosis of the pancreatic transplant to the bladder  VESSELS:  Unremarkable for patient's age  PELVIS    REPRODUCTIVE ORGANS:  Unremarkable for patient's age  URINARY BLADDER:  The bladder is distended   There appears to be a bladder diverticulum extending from the fundus to the right with a coarse calcification noted within its lumen   As stated above this most likely is related to previous pancreas   transplant surgery with drainage into the bladder   High density could also represent some suture material in addition to suture material seen more anteriorly  ABDOMINAL WALL/INGUINAL REGIONS: Patchy areas of subcutaneous fat infiltration in the anterior abdominal walls probably related to injection sites  OSSEOUS STRUCTURES:  No acute fracture or destructive osseous lesion  Impression:       Left lower quadrant renal transplant with slight stranding could be related to patient's UTI according to history  Mohan Akers is no transplant hydronephrosis seen  The native kidneys demonstrate no hydronephrosis      Evidence of prior pancreas transplant  Mohan Akers appears to be fluid-filled structure interposed between the bladder and pancreas transplant consistent with prior transplant procedure with drainage to the bladder rather than small bowel  Small pericardial effusion  Thank you for allowing me to participate in this patients care  Please do not hesitate to call with any additional questions    MER Aranda

## 2019-11-07 NOTE — TELEPHONE ENCOUNTER
We will arrange for a nursing visit for clean intermittent catheterization teaching, recommend that the patient catheterize between 2-3 times per day

## 2019-11-07 NOTE — ASSESSMENT & PLAN NOTE
Patient is status post kidney and pancreatic transplant in 1998  Continue Prograf and steroids  Nephrology is following  Ultrasound reviewed

## 2019-11-07 NOTE — ASSESSMENT & PLAN NOTE
With metabolic acidosis, suspect prerenal and possibility of AIN from use of Revlimid  Patient is status post kidney and pancreatic transplant in 1998,  chronic kidney disease stage 4,   Started on bicarb drip by Nephrology  Ultrasound reviewed  Nephrology on board

## 2019-11-07 NOTE — PROGRESS NOTES
Progress Note - Shelly Oleksandr 1965, 53 y o  female MRN: [id number]    [facility] Encounter: [id number]    Primary Care Provider: Idris Bourgeois MD   Date and time admitted to hospital: 11/5/2019 11:16 AM        * Acute renal failure superimposed on stage 4 chronic kidney disease ([address] )  Assessment & Plan  With metabolic acidosis, suspect prerenal and possibility of AIN from use of Revlimid  Patient is status post kidney and pancreatic transplant in 1998,  chronic kidney disease stage 4,   Received bicarb drip-off of IV fluids now  Ultrasound reviewed  Nephrology on board  CT abdomen reviewed-  Creatinine improving-baseline 2 5          Multiple myeloma not having achieved remission [facility])  Assessment & Plan  Patient follow with Dr Reji Lauren  Currently on Revlimid  Hold for now   Follow-up with Oncology as an outpatient    Anemia  Assessment & Plan  Patient with underlying chronic anemia  Monitor  Hemoglobin is down to 7 1  Will monitor may need transfusion if drops below 7, baseline hemoglobin is close to 7 5  Likely secondary to CKD/multiple myeloma      Essential hypertension  Assessment & Plan  With elevated BP  Continue amlodipine, metoprolol, clonidine, doxazosin and hydralazine  Nephrology is following    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 [facility])  Assessment & Plan  Lab Results   Component Value Date    HGBA1C 5 1 09/09/2019       Recent Labs     11/06/19  2101 11/07/19  0709 11/07/19  1108 11/07/19  1612   POCGLU 145* 123 134 146*       Blood Sugar Average: Last 72 hrs:  (P) 575 [id number] Patient takes toujeo 1 unit qhs  Monitor  Blood sugar acceptable  History of pancreatic transplant    UTI (urinary tract infection)  Assessment & Plan  Patient with history of recurrent UTI  She reported dysuria and urinary frequency  Started on Keflex outpatient 4 days ago with mild improvement in her dysuria  Dysuria improving    Patient is on Vantin  Patient with urinary retention and supposed to be straight cathed as per outpatient records  Given the abnormality seen on the CT scan we will consult Urology    Acquired hypothyroidism  Assessment & Plan  Continue levothyroxine    Renal transplant recipient  Assessment & Plan  Patient is status post kidney and pancreatic transplant in   Continue Prograf and steroids  Nephrology is following  Ultrasound reviewed    Acute metabolic encephalopathyresolved as of 2019  Assessment & Plan  Patient reported some confusion and decreasing oral intak  POA, likely secondary to above  Continue with supportive care and above treatment  Appears to be resolved    VTE Pharmacologic Prophylaxis:   Pharmacologic: Heparin  Mechanical VTE Prophylaxis in Place: Yes    Patient Centered Rounds: I have performed bedside rounds with nursing staff today  Discussions with Specialists or Other Care Team Provider:  Infectious Disease    Education and Discussions with Family / Patient:     Time Spent for Care: 30 minutes  More than 50% of total time spent on counseling and coordination of care as described above  Current Length of Stay: 2 day(s)    Current Patient Status: Inpatient   Certification Statement: The patient will continue to require additional inpatient hospital stay due to urology evaluation    Discharge Plan:     Code Status: Level 1 - Full Code      Subjective:   Patient seen and examined  No specific complaints offered  No dysuria  Objective:     Vitals:   Temp (24hrs), Av 1 °F (36 7 °C), Min:97 9 °F (36 6 °C), Max:98 5 °F (36 9 °C)    Temp:  [97 9 °F (36 6 °C)-98 5 °F (36 9 °C)] 97 9 °F (36 6 °C)  HR:  [57-61] 61  Resp:  [16-18] 16  BP: (170-182)/(61-64) 170/61  SpO2:  [92 %-97 %] 94 %  Body mass index is 32 84 kg/m²  Input and Output Summary (last 24 hours):        Intake/Output Summary (Last 24 hours) at 2019 1650  Last data filed at 2019 1515  Gross per 24 hour   Intake 635 ml   Output 3500 ml Net -2865 ml       Physical Exam:     Physical Exam   Constitutional: She appears well-developed  No distress  HENT:   Head: Normocephalic and atraumatic  Eyes: Right eye exhibits no discharge  Left eye exhibits no discharge  Neck: No JVD present  Cardiovascular: Normal rate and regular rhythm  No murmur heard  Pulmonary/Chest: Effort normal  No stridor  No respiratory distress  Abdominal: Soft  She exhibits no distension  There is no tenderness  Musculoskeletal: Normal range of motion  She exhibits no edema  Neurological: She is alert  No cranial nerve deficit  Skin: Skin is warm  Additional Data:     Labs:    Results from last 7 days   Lab Units 11/07/19  0453  11/06/19  0508   WBC Thousand/uL 5 02  --  5 53   HEMOGLOBIN g/dL 7 1*  --  7 1*   HEMATOCRIT % 23 2*  --  23 4*   PLATELETS Thousands/uL 168   < > 169   NEUTROS PCT %  --   --  61   LYMPHS PCT %  --   --  21   MONOS PCT %  --   --  8   EOS PCT %  --   --  7*    < > = values in this interval not displayed  Results from last 7 days   Lab Units 11/07/19  0513  11/05/19  1204   POTASSIUM mmol/L 3 8   < > 4 5   CHLORIDE mmol/L 107   < > 118*   CO2 mmol/L 27   < > 15*   BUN mg/dL 37*   < > 50*   CREATININE mg/dL 2 62*   < > 2 86*   CALCIUM mg/dL 8 1*   < > 8 7   ALK PHOS U/L  --   --  111   ALT U/L  --   --  18   AST U/L  --   --  10    < > = values in this interval not displayed  * I Have Reviewed All Lab Data Listed Above  * Additional Pertinent Lab Tests Reviewed:  [facility]    Imaging:    Imaging Reports Reviewed Today Include:   Imaging Personally Reviewed by Myself Includes:      Recent Cultures (last 7 days):     Results from last 7 days   Lab Units 11/05/19  1515 11/04/19  0713   URINE CULTURE  No Growth <1000 cfu/mL <10,000 cfu/ml        Last 24 Hours Medication List:     Current Facility-Administered Medications:  acetaminophen 650 mg Oral Q6H PRN Olivia Parisi MD amLODIPine 10 mg Oral QAM Marii Novel, DO   ARIPiprazole 30 mg Oral HS Marii Novel, DO   aspirin 81 mg Oral Daily Marii Novel, DO   busPIRone 5 mg Oral BID Marii Novel, DO   cefpodoxime 400 mg Oral Daily With Lunch Julianna Fernandez MD   cholecalciferol 3,000 Units Oral Daily Marii Novel, DO   cloNIDine 0 3 mg Oral [facility] Marii Novel, DO   doxazosin 4 mg Oral HS Marii Novel, DO   DULoxetine 60 mg Oral Daily Marii Novel, DO   ferrous sulfate 325 mg Oral Daily With Breakfast Marii Novel, DO   folic acid 6,958 mcg Oral Daily Marii Novel, DO   heparin (porcine) 5,000 Units Subcutaneous [facility] Novel, DO   hydrALAZINE 50 mg Oral Q8H [address] Marii Novel, DO   insulin glargine 5 Units Subcutaneous HS Marii Novel, DO   insulin lispro 1-5 Units Subcutaneous TID AC Marii Novel, DO   insulin lispro 1-5 Units Subcutaneous HS Marii Novel, DO   levothyroxine 125 mcg Oral Early Morning Marii Novel, DO   LORazepam 1 mg Oral Q8H PRN Marii Novel, DO   metoprolol tartrate 50 mg Oral Q12H [address] Marii Novel, DO   ondansetron 4 mg Intravenous Q6H PRN Hiro Farias PA-C   pravastatin 80 mg Oral Daily With Harmony Perez, DO   predniSONE 5 mg Oral Daily Marii Novel, DO   rOPINIRole 0 25 mg Oral BID Marii Novel, DO   sertraline 200 mg Oral Daily Marii Novel, DO   sevelamer 800 mg Oral TID With Meals Marii Novel, DO   sodium bicarbonate 1,300 mg Oral TID Marii Novel, DO   tacrolimus 2 mg Oral QPM Aris Agrawal MD   tacrolimus 3 mg Oral Daily Aris Agrawal MD        Today, Patient Was Seen By: Cathy Perez MD    ** Please Note: Dictation voice to text software may have been used in the creation of this document   **

## 2019-11-07 NOTE — OCCUPATIONAL THERAPY NOTE
Occupational Therapy Cancellation Note        Patient Name: Da Fraser  BSRPW'R Date: 11/7/2019    Chart reviewed  Attempted follow up treatment session however pt refusing stating "I just got back into bed and I am exhausted and I received bad news (may need a urethral catheter), I am not up to it"  Will continue to follow and attempt treatment session    Jairo AGRAWAL, OTR/L

## 2019-11-08 ENCOUNTER — TRANSITIONAL CARE MANAGEMENT (OUTPATIENT)
Dept: FAMILY MEDICINE CLINIC | Facility: CLINIC | Age: 54
End: 2019-11-08

## 2019-11-08 VITALS
BODY MASS INDEX: 32.74 KG/M2 | DIASTOLIC BLOOD PRESSURE: 65 MMHG | HEART RATE: 56 BPM | OXYGEN SATURATION: 97 % | WEIGHT: 216 LBS | SYSTOLIC BLOOD PRESSURE: 158 MMHG | RESPIRATION RATE: 17 BRPM | TEMPERATURE: 97.9 F | HEIGHT: 68 IN

## 2019-11-08 LAB
ANION GAP SERPL CALCULATED.3IONS-SCNC: 6 MMOL/L (ref 4–13)
BASOPHILS # BLD AUTO: 0.1 THOUSANDS/ΜL (ref 0–0.1)
BASOPHILS NFR BLD AUTO: 2 % (ref 0–1)
BUN SERPL-MCNC: 36 MG/DL (ref 5–25)
CALCIUM SERPL-MCNC: 8.5 MG/DL (ref 8.3–10.1)
CHLORIDE SERPL-SCNC: 107 MMOL/L (ref 100–108)
CO2 SERPL-SCNC: 27 MMOL/L (ref 21–32)
CREAT SERPL-MCNC: 2.58 MG/DL (ref 0.6–1.3)
EOSINOPHIL # BLD AUTO: 0.24 THOUSAND/ΜL (ref 0–0.61)
EOSINOPHIL NFR BLD AUTO: 5 % (ref 0–6)
ERYTHROCYTE [DISTWIDTH] IN BLOOD BY AUTOMATED COUNT: 18.2 % (ref 11.6–15.1)
GFR SERPL CREATININE-BSD FRML MDRD: 20 ML/MIN/1.73SQ M
GLUCOSE SERPL-MCNC: 103 MG/DL (ref 65–140)
GLUCOSE SERPL-MCNC: 104 MG/DL (ref 65–140)
GLUCOSE SERPL-MCNC: 99 MG/DL (ref 65–140)
HCT VFR BLD AUTO: 23.9 % (ref 34.8–46.1)
HGB BLD-MCNC: 7.3 G/DL (ref 11.5–15.4)
IMM GRANULOCYTES # BLD AUTO: 0.08 THOUSAND/UL (ref 0–0.2)
IMM GRANULOCYTES NFR BLD AUTO: 2 % (ref 0–2)
LYMPHOCYTES # BLD AUTO: 1.39 THOUSANDS/ΜL (ref 0.6–4.47)
LYMPHOCYTES NFR BLD AUTO: 27 % (ref 14–44)
MCH RBC QN AUTO: 30.4 PG (ref 26.8–34.3)
MCHC RBC AUTO-ENTMCNC: 30.5 G/DL (ref 31.4–37.4)
MCV RBC AUTO: 100 FL (ref 82–98)
MISCELLANEOUS LAB TEST RESULT: NORMAL
MONOCYTES # BLD AUTO: 0.36 THOUSAND/ΜL (ref 0.17–1.22)
MONOCYTES NFR BLD AUTO: 7 % (ref 4–12)
NEUTROPHILS # BLD AUTO: 2.94 THOUSANDS/ΜL (ref 1.85–7.62)
NEUTS SEG NFR BLD AUTO: 57 % (ref 43–75)
NRBC BLD AUTO-RTO: 0 /100 WBCS
PLATELET # BLD AUTO: 163 THOUSANDS/UL (ref 149–390)
PMV BLD AUTO: 12.5 FL (ref 8.9–12.7)
POTASSIUM SERPL-SCNC: 3.8 MMOL/L (ref 3.5–5.3)
RBC # BLD AUTO: 2.4 MILLION/UL (ref 3.81–5.12)
SODIUM SERPL-SCNC: 140 MMOL/L (ref 136–145)
WBC # BLD AUTO: 5.11 THOUSAND/UL (ref 4.31–10.16)

## 2019-11-08 PROCEDURE — 99239 HOSP IP/OBS DSCHRG MGMT >30: CPT | Performed by: FAMILY MEDICINE

## 2019-11-08 PROCEDURE — 99233 SBSQ HOSP IP/OBS HIGH 50: CPT | Performed by: INTERNAL MEDICINE

## 2019-11-08 PROCEDURE — 85025 COMPLETE CBC W/AUTO DIFF WBC: CPT | Performed by: INTERNAL MEDICINE

## 2019-11-08 PROCEDURE — 82948 REAGENT STRIP/BLOOD GLUCOSE: CPT

## 2019-11-08 PROCEDURE — 99232 SBSQ HOSP IP/OBS MODERATE 35: CPT | Performed by: INTERNAL MEDICINE

## 2019-11-08 PROCEDURE — 80048 BASIC METABOLIC PNL TOTAL CA: CPT | Performed by: INTERNAL MEDICINE

## 2019-11-08 RX ORDER — CEFPODOXIME PROXETIL 200 MG/1
400 TABLET, FILM COATED ORAL
Qty: 10 TABLET | Refills: 0 | Status: SHIPPED | OUTPATIENT
Start: 2019-11-09 | End: 2019-11-14

## 2019-11-08 RX ADMIN — BUSPIRONE HYDROCHLORIDE 5 MG: 5 TABLET ORAL at 08:30

## 2019-11-08 RX ADMIN — HYDRALAZINE HYDROCHLORIDE 50 MG: 50 TABLET ORAL at 13:00

## 2019-11-08 RX ADMIN — FERROUS SULFATE TAB 325 MG (65 MG ELEMENTAL FE) 325 MG: 325 (65 FE) TAB at 08:28

## 2019-11-08 RX ADMIN — CLONIDINE HYDROCHLORIDE 0.3 MG: 0.1 TABLET ORAL at 05:57

## 2019-11-08 RX ADMIN — SEVELAMER HYDROCHLORIDE 800 MG: 800 TABLET, FILM COATED PARENTERAL at 12:59

## 2019-11-08 RX ADMIN — HEPARIN SODIUM 5000 UNITS: 5000 INJECTION INTRAVENOUS; SUBCUTANEOUS at 05:57

## 2019-11-08 RX ADMIN — MELATONIN 3000 UNITS: at 08:28

## 2019-11-08 RX ADMIN — ROPINIROLE HYDROCHLORIDE 0.25 MG: 0.25 TABLET, FILM COATED ORAL at 08:34

## 2019-11-08 RX ADMIN — SODIUM BICARBONATE 650 MG TABLET 1300 MG: at 08:25

## 2019-11-08 RX ADMIN — TACROLIMUS 3 MG: 1 CAPSULE ORAL at 08:33

## 2019-11-08 RX ADMIN — HYDRALAZINE HYDROCHLORIDE 50 MG: 50 TABLET ORAL at 05:57

## 2019-11-08 RX ADMIN — CLONIDINE HYDROCHLORIDE 0.3 MG: 0.1 TABLET ORAL at 12:59

## 2019-11-08 RX ADMIN — SEVELAMER HYDROCHLORIDE 800 MG: 800 TABLET, FILM COATED PARENTERAL at 08:24

## 2019-11-08 RX ADMIN — AMLODIPINE BESYLATE 10 MG: 10 TABLET ORAL at 08:25

## 2019-11-08 RX ADMIN — DULOXETINE HYDROCHLORIDE 60 MG: 60 CAPSULE, DELAYED RELEASE ORAL at 08:25

## 2019-11-08 RX ADMIN — CEFPODOXIME PROXETIL 400 MG: 200 TABLET, FILM COATED ORAL at 12:59

## 2019-11-08 RX ADMIN — PREDNISONE 5 MG: 5 TABLET ORAL at 08:30

## 2019-11-08 RX ADMIN — METOPROLOL TARTRATE 50 MG: 50 TABLET, FILM COATED ORAL at 08:29

## 2019-11-08 RX ADMIN — FOLIC ACID 1000 MCG: 1 TABLET ORAL at 08:29

## 2019-11-08 RX ADMIN — ASPIRIN 81 MG 81 MG: 81 TABLET ORAL at 08:30

## 2019-11-08 RX ADMIN — LEVOTHYROXINE SODIUM 125 MCG: 125 TABLET ORAL at 05:57

## 2019-11-08 RX ADMIN — HEPARIN SODIUM 5000 UNITS: 5000 INJECTION INTRAVENOUS; SUBCUTANEOUS at 13:00

## 2019-11-08 RX ADMIN — SERTRALINE HYDROCHLORIDE 200 MG: 100 TABLET ORAL at 08:25

## 2019-11-08 NOTE — RESTORATIVE TECHNICIAN NOTE
Restorative Specialist Mobility Note       Activity: Ambulate in barron, Chair     Assistive Device: Front wheel walker        Repositioned: Up in chair, Sitting

## 2019-11-08 NOTE — TELEPHONE ENCOUNTER
Added to previous encounter from [facility]  Patient wishes to be seen at [facility]

## 2019-11-08 NOTE — PROGRESS NOTES
Pt BP remains elevated at 177/56, after HS doses of hydralazine, lopressor and clonidine given  AJAY Cavanaugh paged and made aware of this, orders given to repeat BP manually at 0300 and call if still elevated  Will continue to monitor

## 2019-11-08 NOTE — PROGRESS NOTES
NEPHROLOGY PROGRESS NOTE    Marline Cohn 53 y o  female MRN: [id number]  [facility] Encounter: [id number]  Reason for Consult:  Acute on chronic kidney disease in renal transplant patient    The patient was sitting on the edge of the bed was eating independently looks much better and she says she feels much stronger  The only change overnight was that she was found have urine retention requiring Moreno catheter placement had large residual     ASSESSMENT/PLAN:  1  Renal    The patient has chronic kidney disease in her renal allograft baseline creatinine is 2 5  She was admitted her creatinine was slightly above her baseline she was placed on IV fluids  Part of this could have been related to urine retention is Moreno catheter was placed and she was found to have large residual and creatinine is approaching baseline  Continue tacrolimus 3 mg in the morning 2 mg in the evening which is her outpatient dose  Recent tacrolimus trough level through Dr Cook Range was in the appropriate range  Continue prednisone 5 mg a day  Monitor with Moreno catheter and urology has been consulted  If patient fails any future voiding trials may need to go home with Moreno catheter and potentially learn how to do self straight catheterization  2  Metabolic acidosis    The patient had pancreas transplant in the past and the exocrine duct is held into the bladder so you will have urinary bicarb losses as a result of this  At the present time the endocrine function is not working and that has been known and she is on insulin treatment  Continue oral bicarbonate treatment which she is on as an outpatient as well  3  UTI    The patient had symptoms of urinary tract infection and this was started to be treated as an outpatient so cultures were negative in the hospital   Blood cultures are also negative  She has been treated with antibiotics and looks much improved    Also she was discovered to have urine retention which will predispose to UTI as well  Clinically much improved  4  History of multiple myeloma  SUBJECTIVE:  Review of Systems   Constitution: Negative for chills, fever, malaise/fatigue and night sweats  I feel much better today patient is improved appetite  HENT: Negative  Eyes: Negative  Cardiovascular: Negative  Negative for chest pain, dyspnea on exertion, orthopnea and palpitations  Respiratory: Negative  Negative for cough, hemoptysis, shortness of breath and wheezing  Gastrointestinal: Negative  Negative for bloating, abdominal pain, diarrhea, nausea and vomiting  Genitourinary: Negative for bladder incontinence, dysuria and hematuria  Moreno catheter in now for urine retention  Patient says she was unaware that she was having issues with emptying her bladder  Neurological: Negative  Psychiatric/Behavioral: Negative for altered mental status  OBJECTIVE:  Current Weight: Weight - Scale: 98 kg (216 lb)  Vitals:Temp (24hrs), Av 1 °F (36 7 °C), Min:97 9 °F (36 6 °C), Max:98 6 °F (37 °C)  Current: Temperature: 97 9 °F (36 6 °C)   Blood pressure (!) 178/62, pulse 56, temperature 97 9 °F (36 6 °C), resp  rate 17, height 5' 8" (1 727 m), weight 98 kg (216 lb), SpO2 97 %  , Body mass index is 32 84 kg/m²  Intake/Output Summary (Last 24 hours) at 2019 1023  Last data filed at 2019 0829  Gross per 24 hour   Intake 600 ml   Output 1425 ml   Net -825 ml       Physical Exam: BP (!) 178/62   Pulse 56   Temp 97 9 °F (36 6 °C)   Resp 17   Ht 5' 8" (1 727 m)   Wt 98 kg (216 lb)   LMP  (LMP Unknown)   SpO2 97%   BMI 32 84 kg/m²   Physical Exam   Constitutional: She is oriented to person, place, and time  No distress  HENT:   Head: Atraumatic  Eyes: Right eye exhibits no discharge  Left eye exhibits no discharge  No scleral icterus  Neck: Neck supple  No JVD present  Cardiovascular: Normal rate and regular rhythm   Exam reveals no gallop and no friction rub  Pulmonary/Chest: Effort normal and breath sounds normal  No respiratory distress  She has no wheezes  She has no rales  Abdominal: Soft  Bowel sounds are normal  She exhibits no distension  There is no tenderness  There is no rebound  Neurological: She is alert and oriented to person, place, and time  No cranial nerve deficit  Psychiatric: She has a normal mood and affect         Medications:    Current Facility-Administered Medications:     acetaminophen (TYLENOL) tablet 650 mg, 650 mg, Oral, Q6H PRN, Evans Lloyd MD, 650 mg at 11/06/19 1329    amLODIPine (NORVASC) tablet 10 mg, 10 mg, Oral, QAM, Estefany Falls, DO, 10 mg at 11/08/19 0825    ARIPiprazole (ABILIFY) tablet 30 mg, 30 mg, Oral, HS, Estefany Falls, DO, 30 mg at 11/07/19 2112    aspirin chewable tablet 81 mg, 81 mg, Oral, Daily, Estefany Falls, DO, 81 mg at 11/08/19 0830    busPIRone (BUSPAR) tablet 5 mg, 5 mg, Oral, BID, Estefany Falls, DO, 5 mg at 11/08/19 0830    cefpodoxime (VANTIN) tablet 400 mg, 400 mg, Oral, Daily With Lunch, Diane Sosa MD, 400 mg at 11/07/19 1320    cholecalciferol (VITAMIN D3) tablet 3,000 Units, 3,000 Units, Oral, Daily, Estefany Falls, DO, 3,000 Units at 11/08/19 0828    cloNIDine (CATAPRES) tablet 0 3 mg, 0 3 mg, Oral, Q8H [address], Estefany Falls, DO, 0 3 mg at 11/08/19 0557    doxazosin (CARDURA) tablet 4 mg, 4 mg, Oral, HS, Estefany Falls, DO, 4 mg at 11/07/19 2112    DULoxetine (CYMBALTA) delayed release capsule 60 mg, 60 mg, Oral, Daily, Estefany Falls, DO, 60 mg at 11/08/19 0825    ferrous sulfate tablet 325 mg, 325 mg, Oral, Daily With Breakfast, Estefany Falls, DO, 325 mg at 59/52/60 8457    folic acid (FOLVITE) tablet 1,000 mcg, 1,000 mcg, Oral, Daily, Estefany Falls, DO, 1,000 mcg at 11/08/19 0829    heparin (porcine) subcutaneous injection 5,000 Units, 5,000 Units, Subcutaneous, Q8H [address], 5,000 Units at 11/08/19 0557 **AND** [COMPLETED] Platelet count, , , Once, Estefany Cook DO    hydrALAZINE (APRESOLINE) tablet 50 mg, 50 mg, Oral, Q8H [facility], Sarah Carter DO, 50 mg at 11/08/19 0557    insulin glargine (LANTUS) subcutaneous injection 5 Units 0 05 mL, 5 Units, Subcutaneous, HS, Sarah Carter DO, 5 Units at 11/07/19 2118    insulin lispro (HumaLOG) 100 units/mL subcutaneous injection 1-5 Units, 1-5 Units, Subcutaneous, TID AC **AND** Fingerstick Glucose (POCT), , , TID AC, Sarah Carter DO    insulin lispro (HumaLOG) 100 units/mL subcutaneous injection 1-5 Units, 1-5 Units, Subcutaneous, HS, Sarah Carter DO, 2 Units at 11/05/19 2237    levothyroxine tablet 125 mcg, 125 mcg, Oral, Early Morning, Sarah Carter DO, 125 mcg at 11/08/19 0557    LORazepam (ATIVAN) tablet 1 mg, 1 mg, Oral, Q8H PRN, Sarah Carter DO, 1 mg at 11/07/19 2122    metoprolol tartrate (LOPRESSOR) tablet 50 mg, 50 mg, Oral, Q12H [facility], Sarah Carter DO, 50 mg at 11/08/19 0829    ondansetron (ZOFRAN) injection 4 mg, 4 mg, Intravenous, Q6H PRN, Scotty Farah PA-C, 4 mg at 11/06/19 2229    pravastatin (PRAVACHOL) tablet 80 mg, 80 mg, Oral, Daily With Corinda EverDO douglas, 80 mg at 11/07/19 1723    predniSONE tablet 5 mg, 5 mg, Oral, Daily, Sarah Carter DO, 5 mg at 11/08/19 0830    rOPINIRole (REQUIP) tablet 0 25 mg, 0 25 mg, Oral, BID, Sarah Carter DO, 0 25 mg at 11/08/19 0834    sertraline (ZOLOFT) tablet 200 mg, 200 mg, Oral, Daily, Sarah Carter DO, 200 mg at 11/08/19 0825    sevelamer (RENAGEL) tablet 800 mg, 800 mg, Oral, TID With Meals, Sarah Carter DO, 800 mg at 11/08/19 0824    sodium bicarbonate tablet 1,300 mg, 1,300 mg, Oral, TID, Sarah Carter DO, 1,300 mg at 11/08/19 0825    tacrolimus (PROGRAF) capsule 2 mg, 2 mg, Oral, QPM, Catrachito Alicea MD, 2 mg at 11/07/19 2112    tacrolimus (PROGRAF) capsule 3 mg, 3 mg, Oral, Daily, Catrachito Alicea MD, 3 mg at 11/08/19 6781    Laboratory Results:  Lab Results   Component Value Date    WBC 5 11 11/08/2019    HGB 7 3 (L) 11/08/2019    HCT 23 9 (L) 11/08/2019     (H) 11/08/2019     11/08/2019     Lab Results   Component Value Date    SODIUM 140 11/08/2019    K 3 8 11/08/2019     11/08/2019    CO2 27 11/08/2019    BUN 36 (H) 11/08/2019    CREATININE 2 58 (H) 11/08/2019    GLUC 99 11/08/2019    CALCIUM 8 5 11/08/2019     Lab Results   Component Value Date    CALCIUM 8 5 11/08/2019    PHOS 3 8 11/05/2019     No results found for: LABPROT

## 2019-11-08 NOTE — OCCUPATIONAL THERAPY NOTE
Occupational Therapy Cancellation note        Patient Name: Shelly Leggett  XMMCE'L Date: 11/8/2019    Chart reviewed  Pt declining OT treatment session stating "I just got settled and lunch is coming I would rather do this later"  Will continue attempts for follow up treatment session    [place] AGRAWAL, OTR/L

## 2019-11-08 NOTE — TELEPHONE ENCOUNTER
Kerry Awad, [id number] [facility] 14 hours ago (5:13 PM)      Please arrange for a nursing visit for clean intermittent catheterization teaching, recommend that the patient catheterize between 2-3 times per day  Routing comment       Kerry Awad MD 14 hours ago (5:13 PM)         We will arrange for a nursing visit for clean intermittent catheterization teaching, recommend that the patient catheterize between 2-3 times per day           Documentation

## 2019-11-08 NOTE — TELEPHONE ENCOUNTER
Patient still currently inpatient at this time  First available diagnostic nurse appointment for CIC teaching at [facility] is 11/22/19  Called patient and left a message of her home phone as she is still currently inpatient  Tentatively booked for 11/22/19 at 9 am for cic teaching and hinojosa removal if she is discharged with hinojosa catheter  Please confirm if patient calls back  If she is unable to keep this appointment will need to discuss coming to another location or coordinating with the regular [address] [place] [address] schedule

## 2019-11-08 NOTE — PROGRESS NOTES
Progress Note - Infectious Disease   Georgette Peng 53 y o  female MRN: [id number]  [facility] Encounter: [id number]      Impression/Plan:  1  Symptomatic urinary tract infection and possible transplant pyelonephritis   Patient presents at this time with symptomatic urinary tract infection   She reports having dysuria, frequent, and urgency   She reported progressive symptoms despite taking Keflex  Urine culture however on 11/04 is without growth and repeat cultures are negative  Viral studies are pending  Imaging notable for distended bladder and stranding around the transplant  Patient's family reported history of urinary retention where patient refused prior self catheterization  She is now status post Moreno catheter placement, and hemodynamically stable  Continue oral cefpodoxime 400 mg Q 24  Plan to treat for total of 10 days through 11/14  Would not recommend chronic antibiotic suppression, given problems 2 and 3  Urology evaluation appreciated  Follow-up CMV, adenovirus and BK virus studies as outpatient  Continue lab monitoring  while admitted  Continue to trend fever curve/vitals while admitted  Additional supportive care as per primary  Patient is otherwise cleared from an ID standpoint for discharge      2  Chronic kidney disease   Patient with underlying chronic kidney disease   Creatinine appears to be near baseline   Recent transplant ultrasounds noted with increased indices and question of underlying rejection  Ongoing follow-up by Nephrology  Antibiotics have been dose adjusted as above--creatinine clearance borderline 30  Supportive care otherwise as per primary     3  Chronic immunosuppression and Renal transplant   Patient remains on chronic immunosuppression with tacrolimus and prednisone because of her prior transplant    Ongoing follow-up by Nephrology  Continue to monitor tacrolimus level  Additional care as above     4  Multiple myeloma   Patient remains on chemotherapy for underlying multiple myeloma   Recommend follow-up with Oncology as outpatient      Above plan discussed in detail with the patient      ID consult service will formally re-evaluate this patient again on Monday if she is still admitted  Please contact ID attending on call if any additional questions or concerns over the weekend  Antibiotics:  Cefpodoxime 2  Total antibiotic 4    24 hour events:  Patient seen by Urology and recommended for further workup as outpatient  Moreno catheter placed yesterday  Patient is currently afebrile and without leukocytosis  No new culture data  No new imaging overnight  Patient's other vitals are stable  Creatinine further down trended to 2 5    Subjective:  Patient he denies having any nausea, vomiting, chest pain or shortness of breath  She reports symptomatically feeling better after having catheter placement  She is aware of plans for discharge with catheter  Objective:  Vitals:  Temp:  [97 9 °F (36 6 °C)-98 6 °F (37 °C)] 98 6 °F (37 °C)  HR:  [57-65] 57  Resp:  [18-20] 20  BP: (156-184)/(56-86) 164/86  SpO2:  [93 %-96 %] 93 %  Temp (24hrs), Av 3 °F (36 8 °C), Min:97 9 °F (36 6 °C), Max:98 6 °F (37 °C)  Current: Temperature: 98 6 °F (37 °C)    Physical Exam:   General Appearance:  Alert, interactive, nontoxic, no acute distress  Chronically ill-appearing  Throat: Oropharynx moist without lesions  Poor dentition noted  Lungs:   Clear to auscultation bilaterally; no wheezes, rhonchi or rales; respirations unlabored on room   Heart:  RRR; no murmur, rub or gallop   Abdomen:   Soft, non-tender, non-distended, positive bowel sounds  Moreno catheter draining clear yellow urine  Extremities: No clubbing, cyanosis or edema   Skin: No new rashes or lesions  No new draining wounds noted         Labs, Imaging, & Other studies:   All pertinent labs and imaging studies were personally reviewed  Results from last 7 days   Lab Units 19  7257 19  8809 11/06/19  1120 11/06/19  0508   WBC Thousand/uL 5 11 5 02  --  5 53   HEMOGLOBIN g/dL 7 3* 7 1*  --  7 1*   PLATELETS Thousands/uL 163 168 161 169     Results from last 7 days   Lab Units 11/08/19  0447  11/05/19  1204 11/04/19  0713   POTASSIUM mmol/L 3 8   < > 4 5 4 4   CHLORIDE mmol/L 107   < > 118* 116*   CO2 mmol/L 27   < > 15* 16*   BUN mg/dL 36*   < > 50* 47*   CREATININE mg/dL 2 58*   < > 2 86* 2 76*   EGFR ml/min/1 73sq m 20   < > 18 19   CALCIUM mg/dL 8 5   < > 8 7 8 5   AST U/L  --   --  10 15   ALT U/L  --   --  18 24   ALK PHOS U/L  --   --  111 102    < > = values in this interval not displayed       Results from last 7 days   Lab Units 11/05/19  1515 11/04/19  0713   URINE CULTURE  No Growth <1000 cfu/mL <10,000 cfu/ml

## 2019-11-09 LAB
CMV DNA SERPL NAA+PROBE-ACNC: NEGATIVE IU/ML
CMV DNA SERPL NAA+PROBE-LOG IU: NORMAL LOG10 IU/ML

## 2019-11-09 NOTE — DISCHARGE SUMMARY
Discharge Summary - [place] [id number] Internal Medicine    Patient Information: Zachary Dumont 53 y o  female MRN: [id number]  Unit/Bed#: [facility] 834-01 Encounter: [id number]    Discharging Physician / Practitioner: Samuel Barclay MD  PCP: Saravanan Luna MD  Admission Date: 11/5/2019  Discharge Date: 11/08/19    Disposition:     Home with vna service    Reason for Admission: Dysuria    Discharge Diagnoses:     Principal Problem:    Acute renal failure superimposed on stage 4 chronic kidney disease ([facility] [address] )  Active Problems:    Renal transplant recipient    Acquired hypothyroidism    UTI (urinary tract infection)    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 ([facility] [address] )    Essential hypertension    Anemia    Multiple myeloma not having achieved remission ([facility] [address] )    Metabolic acidosis    Acute renal failure ([facility] [address] )    Low bicarbonate  Resolved Problems:    Acute metabolic encephalopathy      Consultations During Hospital Stay:  · Infectious disease  · Nephrology   · Urology    Procedures Performed:     · Moreno catheter insertion    Significant Findings / Test Results:     · CT abdomen and pelvis-left lower quadrant renal transplant with slight stranding could be related to patient's UTI according to history  There is no transplant hydronephrosis seen  The native kidneys demonstrate no hydronephrosis  Evidence of prior pancreas transplant  There appears to be fluid filled structure interposed between the bladder and pancreas transplant consistent with prior transplant procedure with drainage to the bladder rather than to the small bowel  Small pericardial effusion  Ultrasound of the transplant kidney with DopplerInterval increased renal transplant upper pole arterial index currently 0 8 previously 0  6  Otherwise, normalization of renal transplant interpolar and lower pole arterial resistive indices  No other significant interval change    Chest x-ray-interval development of mild vascular congestion    ·      Incidental Findings:   ·     Test Results Pending at Discharge (will require follow up):   ·      Outpatient Tests Requested:  ·     Complications:   none    Hospital Course: Owen Laird is a 53 y o  female patient who originally presented to the hospital on 11/5/2019 due to dysuria  Patient with history of recurrent urinary tract infection and was started on antibiotics as an outpatient by Nephrology  Patient presented to the hospital with dysuria and given her complicated history with previous renal transplant patient was admitted to the hospital   Nephrology evaluated the patient  Patient also had acute kidney injury at the time of presentation to the hospital which improved with IV fluids  Her creatinine was at baseline at the time of discharge and was cleared for discharge from the nephrology standpoint  Patient was followed by Infectious Disease because of the symptomatic urinary tract infection and possible transplant by nephritis  Urine culture from 11 for is without any growth and repeat cultures has been negative  But the patient was on Keflex as an outpatient  Patient with known history of urinary retention and was supposed to self catheterization but she refused to do it  Infectious Disease recommended antibiotics for course for 10 days  Patient will be discharged home with Vantin to finish the course of antibiotics  Given the history of urinary retention and recurrent UTI patient was evaluated by Urology and had a Moreno catheter placed  Patient will follow up with Urology as an outpatient for voiding trial and also for teaching self catheterization  Patient remained hemodynamically stable and afebrile symptomatically she was improving and she was discharged in a stable condition on 11/8/2019  For details refer to the chart    Condition at Discharge: good     Discharge Day Visit / Exam:     Subjective:  Patient seen and examined   No  Dysuria, patient goes with hinojosa catheter and follow up with urology for voiding trial and self catheter teaching  Vitals: Blood Pressure: 158/65 (11/08/19 1259)  Pulse: 56 (11/08/19 0829)  Temperature: 97 9 °F (36 6 °C) (11/08/19 0813)  Temp Source: Oral (11/07/19 1523)  Respirations: 17 (11/08/19 0813)  Height: 5' 8" (172 7 cm) (11/05/19 1616)  Weight - Scale: 98 kg (216 lb) (11/05/19 1616)  SpO2: 97 % (11/08/19 0813)  Exam:   Physical Exam   Constitutional: She is oriented to person, place, and time  She appears well-developed  HENT:   Head: Normocephalic  Eyes: Pupils are equal, round, and reactive to light  Neck: Normal range of motion  Cardiovascular: Normal rate and regular rhythm  No murmur heard  Pulmonary/Chest: Effort normal and breath sounds normal    Abdominal: Soft  Musculoskeletal: Normal range of motion  She exhibits no edema  Neurological: She is alert and oriented to person, place, and time  Skin: Skin is warm  Discussion with Family: patient    Discharge instructions/Information to patient and family:   See after visit summary for information provided to patient and family  Provisions for Follow-Up Care:  See after visit summary for information related to follow-up care and any pertinent home health orders  Planned Readmission: none     Discharge Statement:  I spent 45  minutes discharging the patient  This time was spent on the day of discharge  I had direct contact with the patient on the day of discharge  Greater than 50% of the total time was spent examining patient, answering all patient questions, arranging and discussing plan of care with patient as well as directly providing post-discharge instructions  Additional time then spent on discharge activities  Discharge Medications:  See after visit summary for reconciled discharge medications provided to patient and family        ** Please Note: This note has been constructed using a voice recognition system **

## 2019-11-10 LAB
CMV DNA # UR NAA+PROBE: NEGATIVE COPIES/ML
CMV DNA SPEC NAA+PROBE-LOG#: NORMAL LOG10COPY/ML

## 2019-11-11 ENCOUNTER — EPISODE CHANGES (OUTPATIENT)
Dept: CASE MANAGEMENT | Facility: HOSPITAL | Age: 54
End: 2019-11-11

## 2019-11-11 ENCOUNTER — TRANSCRIBE ORDERS (OUTPATIENT)
Dept: ADMINISTRATIVE | Facility: HOSPITAL | Age: 54
End: 2019-11-11

## 2019-11-11 DIAGNOSIS — Z13.820 SPECIAL SCREENING FOR OSTEOPOROSIS: Primary | ICD-10-CM

## 2019-11-11 DIAGNOSIS — F41.1 GAD (GENERALIZED ANXIETY DISORDER): Chronic | ICD-10-CM

## 2019-11-11 RX ORDER — LORAZEPAM 2 MG/1
TABLET ORAL
Qty: 90 TABLET | Refills: 0 | OUTPATIENT
Start: 2019-11-11

## 2019-11-12 ENCOUNTER — HOSPITAL ENCOUNTER (OUTPATIENT)
Dept: INFUSION CENTER | Facility: HOSPITAL | Age: 54
Discharge: HOME | End: 2019-11-12
Attending: INTERNAL MEDICINE
Payer: MEDICARE

## 2019-11-12 ENCOUNTER — OFFICE VISIT (OUTPATIENT)
Dept: HEMATOLOGY ONCOLOGY | Facility: CLINIC | Age: 54
End: 2019-11-12
Payer: MEDICARE

## 2019-11-12 VITALS — SYSTOLIC BLOOD PRESSURE: 180 MMHG | DIASTOLIC BLOOD PRESSURE: 62 MMHG

## 2019-11-12 VITALS
HEIGHT: 68 IN | HEART RATE: 86 BPM | BODY MASS INDEX: 35.16 KG/M2 | TEMPERATURE: 98.7 F | WEIGHT: 232 LBS | RESPIRATION RATE: 14 BRPM | SYSTOLIC BLOOD PRESSURE: 132 MMHG | DIASTOLIC BLOOD PRESSURE: 58 MMHG

## 2019-11-12 DIAGNOSIS — C90.00 MULTIPLE MYELOMA NOT HAVING ACHIEVED REMISSION (HCC): Primary | ICD-10-CM

## 2019-11-12 DIAGNOSIS — D63.1 ANEMIA DUE TO STAGE 3 CHRONIC KIDNEY DISEASE (HCC): ICD-10-CM

## 2019-11-12 DIAGNOSIS — N18.30 ANEMIA DUE TO STAGE 3 CHRONIC KIDNEY DISEASE (HCC): ICD-10-CM

## 2019-11-12 PROCEDURE — 99214 OFFICE O/P EST MOD 30 MIN: CPT | Performed by: INTERNAL MEDICINE

## 2019-11-12 PROCEDURE — 96372 THER/PROPH/DIAG INJ SC/IM: CPT

## 2019-11-12 RX ADMIN — DARBEPOETIN ALFA 200 MCG: 200 INJECTION, SOLUTION INTRAVENOUS; SUBCUTANEOUS at 15:11

## 2019-11-12 NOTE — PROGRESS NOTES
Pt tolerated injection today without incident, explained to pt that she should make sure to take her b/p medication when she gets home  Future appts made

## 2019-11-12 NOTE — PROGRESS NOTES
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 diagnosed on 73/4659    Complicated medical history including transplanted kidney and pancreas, for kidney and pancreatic insufficiency status post transplant in 1998, subsequent pancreatic insufficiency, diabetes mellitus type 1 insulin dependent, bone marrow biopsy on 04/2019 showed progressive myeloma with plasma cell about 35%, monosomy 13, hyper deployedy    Evaluated at [facility], she did not have response to Ixazomib with grade 3 rash    She had lenalidomide 5 mg p o   Daily 3 weeks on 1 week off initiated on 08/07/2019 intermittently because of readmission to the hospital with renal failure, urinary tract infection, she had a grade 3 rash responded to Solu-Medrol and Benadryl    She had good response to Revlimid    Reinitiate a Revlimid 5 mg PO daily 3 weeks on 1 week off    Anemia secondary to chronic kidney disease, current hemoglobin 7 3, Aranesp 200 mcg subcu every month    If no response or side effects to reveal med will start Daratumumab            HPI:Lakesha Mcclendon is a 58-year-old  female with history of type 1 diabetes mellitus, diabetic neuropathy, diabetic nephropathy, status post pancreas and kidney transplant in 1998 at [address] [place] [address], amputation of the right big toe, cholecystectomy, vitamin-D deficiency, exacerbation of diabetes mellitus while she is on insulin, possible pancreas burn out, herpes zoster x2, who was found to have abnormal serum protein electrophoresis in August 2017 with IgG kappa a peak of 0 4 grams/deciliter and peak 2  of 2 27 grams/deciliter, mildly elevated kappa light chain, chronic kidney disease     Status post bone marrow biopsy 9/20/2017 showed 15-20% plasma cells, normal cytogenetics and normal FISH panel for myeloma   Bone skeletal survey showed no evidence of lytic lesions   She was diagnosed with smoldering multiple myeloma   She was meet with Dr Nayeli Ortega from [facility] to continue to observe     She has diabetic neuropathy grade 3, uses a cane for ambulation, herpes zoster in February 2019      On 04/10/2019 kappa light chain 69, lambda light chain 17 2 with a ratio of 4 0 which increased from ratio of 2 8 in January 2019  Serum protein electrophoresis showed M protein of 0 5 and 2 67 IgG kappa, IgG 2 9 g, IgA 62, IgM 31, creatinine 2 0, total protein 9, albumin 3, calcium 8 6     Repeat bone marrow biopsy on 04/17/2019 showed progressive multiple myeloma with plasma cells in the range of 35%, now with multiple chromosomal abnormalities including 13 Q deletion, 11 Q, trisomy 11, gain of 17 PO trisomy 17, hyperdiploid with cane of additional copies of chromosome 5, 6, 7, 9, 11, 15, 17, 18, large deletion involving most of the chromosome 13 (monosomy 13) 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    Ixazomib with severe pruritus requiring discontinuation in June 2019    Lenalidomide 5 mg p o  Daily 3 weeks on 1 week off approved initiated on 08/07/2019 and discontinued on 08/13/2019 because of pruritus    Admission to the hospital with chronic urinary tract infection, ultrasound on the transplanted kidney concerning for ATN possible rejection    Currently she has a Moreno catheter         Interval History:        Previous Treatment:         Test Results:    Imaging: Ct Abdomen Pelvis Wo Contrast    Result Date: 11/6/2019  Narrative: CT ABDOMEN AND PELVIS WITHOUT IV CONTRAST INDICATION:   recurrent UTI in renal transplant patient, r/o intra-abdominal pathology    Status post cholecystectomy; history of pancreatic transplant removal COMPARISON:  2/2/2018 TECHNIQUE:  CT examination of the abdomen and pelvis was performed without intravenous contrast   Axial, sagittal, and coronal 2D reformatted images were created from the source data and submitted for interpretation  Radiation dose length product (DLP) for this visit:  1153 7 mGy-cm   This examination, like all CT scans performed in the [facility], was performed utilizing techniques to minimize radiation dose exposure, including the use of iterative  reconstruction and automated exposure control  Enteric contrast was administered  FINDINGS: ABDOMEN LOWER CHEST:  No clinically significant abnormality identified in the visualized lower chest   Small pericardial effusion or thickening is slightly improved is similar to the prior study  LIVER/BILIARY TREE:  Unremarkable  GALLBLADDER:  The patient is status post cholecystectomy  SPLEEN:  Unremarkable  PANCREAS:  Unremarkable  ADRENAL GLANDS:  Unremarkable  KIDNEYS/URETERS:  Atrophic native kidneys are identified  No hydronephrosis  STOMACH AND BOWEL:  Unremarkable  APPENDIX:  The appendix is not well seen  ABDOMINOPELVIC CAVITY:  No ascites or free intraperitoneal air  Mild retroperitoneal adenopathy appears unchanged with one node measuring up to 1 2 cm in size on image 50  Small pelvic nodes are similar to the prior study  Left renal transplant is seen with probable low density cysts seen laterally which is unchanged  There is no transplant hydronephrosis seen or calculus  There is slight infiltration of the fat surrounding the transplant as compared to some of the prior  examinations  Soft tissue extending in the right lower quadrant leading to some higher density suture material probably represents changes from prior pancreatic transplant  This tissue is similar to prior studies  Surgical sutures appear to abut a bladder diverticulum which probably represents a segment of transplanted duodenum representing anastomosis of the pancreatic transplant to the bladder  VESSELS:  Unremarkable for patient's age  PELVIS REPRODUCTIVE ORGANS:  Unremarkable for patient's age  URINARY BLADDER:  The bladder is distended  There appears to be a bladder diverticulum extending from the fundus to the right with a coarse calcification noted within its lumen  As stated above this most likely is related to previous pancreas transplant surgery with drainage into the bladder  High density could also represent some suture material in addition to suture material seen more anteriorly  ABDOMINAL WALL/INGUINAL REGIONS: Patchy areas of subcutaneous fat infiltration in the anterior abdominal walls probably related to injection sites  OSSEOUS STRUCTURES:  No acute fracture or destructive osseous lesion  Impression: Left lower quadrant renal transplant with slight stranding could be related to patient's UTI according to history  There is no transplant hydronephrosis seen  The native kidneys demonstrate no hydronephrosis  Evidence of prior pancreas transplant  There appears to be fluid-filled structure interposed between the bladder and pancreas transplant consistent with prior transplant procedure with drainage to the bladder rather than small bowel  Small pericardial effusion  Workstation performed: [id number]     Xr Chest 2 Views    Result Date: 11/5/2019  Narrative: CHEST INDICATION:   weakness  COMPARISON:  2/2/2018 EXAM PERFORMED/VIEWS:  XR CHEST PA & LATERAL Images: 2 FINDINGS: Development of mild vascular congestion Cardiomediastinal silhouette appears unremarkable  The lungs are otherwise clear  No pneumothorax or pleural effusion  Osseous structures appear within normal limits for patient age  Impression: Interval development of mild vascular congestion Workstation performed: [id number]     Us Transplant Kidney With Doppler    Result Date: 11/6/2019  Narrative: RENAL ULTRASOUND INDICATION:   UTI symptoms in renal transplant patient   COMPARISON: Renal sonogram 10/1/2019 TECHNIQUE:   Ultrasound of the retroperitoneum was performed with a curvilinear transducer utilizing volumetric sweeps and still imaging techniques  FINDINGS: KIDNEYS: There is severe bilateral renal atrophy with measurements below  Right kidney:  6 4 x 3 4 cm  Cortical thickness 0 8 cm  Left kidney:  Not clearly visualized  Right kidney The renal parenchyma is diffusely echogenic consistent with medical renal disease  No suspicious masses detected  Known small lower pole simple cyst not imaged on this study  No hydronephrosis  No shadowing calculi  No perinephric fluid collections  Left kidney Not visualized  Transplant kidney  13 2 x 6 6 cm  Normal echogenicity and contour  No suspicious masses detected  1 6 cm upper pole simple cyst  No hydronephrosis  No shadowing calculi  No perinephric fluid collection  Upper pole arterial resistive index 0 8 previously 0 6 Interpolar arterial resistive index 0 6 previously 0 7 Lower pole arterial resistive index 0 6 previously 0 8 Patient renal artery and veins  URETERS: Nonvisualized  BLADDER: Normally distended  No focal thickening or mass lesions  Small right posterior bladder diverticulum  Impression: Interval increased renal transplant upper pole arterial index currently 0 8 previously 0 6  Otherwise, normalization of renal transplant interpolar and lower pole arterial resistive indices  No other significant interval change  The examination demonstrates a significant  finding and was documented as such in [facility] for liaison and referring practitioner notification   Workstation performed: [id number]       Labs:   Lab Results   Component Value Date    WBC 5 11 11/08/2019    HGB 7 3 (L) 11/08/2019    HCT 23 9 (L) 11/08/2019     (H) 11/08/2019     11/08/2019     Lab Results   Component Value Date     (L) 01/06/2016    K 3 8 11/08/2019     11/08/2019    CO2 27 11/08/2019    ANIONGAP 6 01/06/2016    BUN 36 (H) 11/08/2019    CREATININE 2 58 (H) 11/08/2019    GLUCOSE 103 09/13/2017    GLUF 121 (H) 11/04/2019    CALCIUM 8 5 11/08/2019    CORRECTEDCA 11 7 (H) 08/28/2018    AST 10 11/05/2019    ALT 18 11/05/2019    ALKPHOS 111 11/05/2019    PROT 8 3 (H) 12/14/2015    BILITOT 0 27 12/14/2015    EGFR 20 11/08/2019       Lab Results   Component Value Date    IRON 69 10/04/2019    TIBC 225 (L) 10/04/2019    FERRITIN 95 10/04/2019       Lab Results   Component Value Date    PPZLRMIR45 438 07/14/2014         ROS: Review of Systems   Constitutional: Positive for fatigue  Negative for appetite change, chills, diaphoresis and unexpected weight change  HENT:   Negative for mouth sores, nosebleeds, sore throat, trouble swallowing and voice change  Eyes: Negative for eye problems and icterus  Respiratory: Positive for shortness of breath (Exertional)  Negative for chest tightness, cough, hemoptysis and wheezing  Cardiovascular: Negative for chest pain, leg swelling and palpitations  Gastrointestinal: Negative for abdominal distention, abdominal pain, blood in stool, constipation, diarrhea, nausea and vomiting  Endocrine: Negative for hot flashes  Genitourinary: Negative for bladder incontinence, difficulty urinating, dyspareunia, dysuria and frequency  Musculoskeletal: Negative for arthralgias, back pain, gait problem, neck pain and neck stiffness  Skin: Negative for itching and rash  Neurological: Positive for numbness  Negative for dizziness, gait problem, headaches, seizures and speech difficulty  Hematological: Negative for adenopathy  Does not bruise/bleed easily  Psychiatric/Behavioral: Negative for decreased concentration, depression, sleep disturbance and suicidal ideas  The patient is not nervous/anxious  Current Medications: Reviewed  Allergies: Reviewed  PMH/FH/SH:  Reviewed      Physical Exam:    Body surface area is 2 17 meters squared      Wt Readings from Last 3 Encounters:   11/12/19 105 kg (232 lb)   11/05/19 98 kg (216 lb)   10/23/19 102 kg (224 lb)        Temp Readings from Last 3 Encounters:   11/12/19 98 7 °F (37 1 °C) 19 97 9 °F (36 6 °C)   19 (!) 97 4 °F (36 3 °C)        BP Readings from Last 3 Encounters:   19 132/58   19 158/65   19 138/58         Pulse Readings from Last 3 Encounters:   19 86   19 56   19 61        Physical Exam   Constitutional: She is oriented to person, place, and time  She appears well-developed and well-nourished  No distress  Using cane for ambulation   HENT:   Head: Normocephalic and atraumatic  Eyes: Conjunctivae are normal    Neck: Normal range of motion  Neck supple  No tracheal deviation present  Cardiovascular: Normal rate and regular rhythm  Exam reveals no gallop and no friction rub  No murmur heard  Pulmonary/Chest: Effort normal and breath sounds normal  No respiratory distress  She has no wheezes  She has no rales  She exhibits no tenderness  Abdominal: Soft  She exhibits no distension  There is no tenderness  Musculoskeletal: She exhibits edema (Plus two edema bilaterally)  Lymphadenopathy:     She has no cervical adenopathy  Neurological: She is alert and oriented to person, place, and time  Skin: Skin is warm and dry  She is not diaphoretic  No erythema  No pallor  Psychiatric: She has a normal mood and affect  Her behavior is normal  Judgment and thought content normal    Vitals reviewed  ECO    Goals and Barriers:  Current Goal: Minimize effects of disease  Barriers: None  Patient's Capacity to Self Care:  Patient is able to self care      Code Status: [unfilled]

## 2019-11-12 NOTE — PROGRESS NOTES
Pt here for Aranesp, B/P elevated from office visit  Pt states that she takes 5 blood pressure medications and believes she is due to take one now (which is at home)  Spoke to [place], RN, explained - OK to give Aranesp today per Dr Leila Wilburn and ensure pt takes B/P med when she gets home

## 2019-11-12 NOTE — PLAN OF CARE
Problem: Potential for Falls  Goal: Patient will remain free of falls  Description  INTERVENTIONS:  - Assess patient frequently for physical needs  -  Identify cognitive and physical deficits and behaviors that affect risk of falls    -  Honor fall precautions as indicated by assessment   - Educate patient/family on patient safety including physical limitations  - Instruct patient to call for assistance with activity based on assessment  - Modify environment to reduce risk of injury  - Consider OT/PT consult to assist with strengthening/mobility  Outcome: Progressing

## 2019-11-13 DIAGNOSIS — F41.1 GAD (GENERALIZED ANXIETY DISORDER): Primary | Chronic | ICD-10-CM

## 2019-11-13 RX ORDER — LORAZEPAM 2 MG/1
2 TABLET ORAL 3 TIMES DAILY PRN
Qty: 90 TABLET | Refills: 3 | Status: SHIPPED | OUTPATIENT
Start: 2019-11-13 | End: 2020-01-01 | Stop reason: SDUPTHER

## 2019-11-14 ENCOUNTER — PATIENT OUTREACH (OUTPATIENT)
Dept: FAMILY MEDICINE CLINIC | Facility: CLINIC | Age: 54
End: 2019-11-14

## 2019-11-16 DIAGNOSIS — G25.81 RLS (RESTLESS LEGS SYNDROME): ICD-10-CM

## 2019-11-16 RX ORDER — ROPINIROLE 0.25 MG/1
TABLET, FILM COATED ORAL
Qty: 180 TABLET | Refills: 1 | Status: SHIPPED | OUTPATIENT
Start: 2019-11-16 | End: 2020-01-01

## 2019-11-17 PROBLEM — R33.9 URINARY RETENTION: Status: ACTIVE | Noted: 2019-11-17

## 2019-11-17 PROBLEM — N18.30 STAGE 3 CHRONIC KIDNEY DISEASE (HCC): Chronic | Status: RESOLVED | Noted: 2017-11-16 | Resolved: 2019-11-17

## 2019-11-17 PROBLEM — Z87.440 HISTORY OF RECURRENT UTIS: Status: RESOLVED | Noted: 2018-10-29 | Resolved: 2019-11-17

## 2019-11-19 ENCOUNTER — TELEPHONE (OUTPATIENT)
Dept: OTHER | Facility: OTHER | Age: 54
End: 2019-11-19

## 2019-11-20 ENCOUNTER — TELEPHONE (OUTPATIENT)
Dept: FAMILY MEDICINE CLINIC | Facility: CLINIC | Age: 54
End: 2019-11-20

## 2019-11-20 NOTE — TELEPHONE ENCOUNTER
I spoke with patient today, she does not wish to reschedule this as she has been instructed to stay home due to her cancer diagnosis  She will keep her December appointment

## 2019-11-20 NOTE — TELEPHONE ENCOUNTER
Hannah OATES (Phone: [phone]) phoned to state patient is refusing to be seen on 11/21/19 or 11/22/2019

## 2019-11-22 ENCOUNTER — PROCEDURE VISIT (OUTPATIENT)
Dept: UROLOGY | Facility: CLINIC | Age: 54
End: 2019-11-22
Payer: MEDICARE

## 2019-11-22 VITALS — DIASTOLIC BLOOD PRESSURE: 82 MMHG | SYSTOLIC BLOOD PRESSURE: 148 MMHG

## 2019-11-22 DIAGNOSIS — N39.0 URINARY TRACT INFECTION WITHOUT HEMATURIA, SITE UNSPECIFIED: ICD-10-CM

## 2019-11-22 DIAGNOSIS — R33.9 URINARY RETENTION: Primary | ICD-10-CM

## 2019-11-22 PROCEDURE — 99211 OFF/OP EST MAY X REQ PHY/QHP: CPT

## 2019-11-22 NOTE — PROGRESS NOTES
11/22/2019    Candelario Gonzales is a 53 y o  female  [id number]    Diagnosis:  Chief Complaint     Urinary Retention; Urinary Tract Infection          Patient presents for hinojosa removal and Clean intermittent catheterization teaching managed by Dr Cisneros Meth:  · Patient provided with catheter samples and a copy of written instructions  · Patient will catheterize 3 x daily  · Catheter ordered submitted today to [facility] for size 14F  · Patient will Follow up in 6 weeks with bladder diary  · Patient knows to call the office with any concerns, problems with supplies or difficulty passing catheter  Teaching:    Patient presents today to office with her mother  Patient using wheelchair, able to ambulate to exam table but complains of "weakness and fatigue"  Hinojosa catheter removed after deflation of an intact balloon  Patient tolerated well  Patient then instructed on CIC teaching  Patient was able to demonstrate proper technique while sitting at edge of exam table  Patient knows that she is to void on her own and cath three times daily  Patient to record voiding and cath residuals and return to office in six weeks  Bladder diary supplied to patient  Also encouraged patient to continue on bowel regimen and avoid constipation as this will aid in retention  Patient and mother verbalized understanding of plan         Vitals:    11/22/19 0908   BP: 148/82   BP Location: Right arm   Patient Position: Sitting   Cuff Size: Adult           Ida Bautista, RN

## 2019-11-23 ENCOUNTER — HOSPITAL ENCOUNTER (INPATIENT)
Facility: HOSPITAL | Age: 54
LOS: 13 days | Discharge: HOME HEALTH | DRG: 689 | End: 2019-12-06
Attending: EMERGENCY MEDICINE | Admitting: HOSPITALIST
Payer: MEDICARE

## 2019-11-23 DIAGNOSIS — E10.49: ICD-10-CM

## 2019-11-23 DIAGNOSIS — N30.00 ACUTE CYSTITIS WITHOUT HEMATURIA: ICD-10-CM

## 2019-11-23 DIAGNOSIS — Z94.0 RENAL TRANSPLANT RECIPIENT: ICD-10-CM

## 2019-11-23 DIAGNOSIS — R26.2 AMBULATORY DYSFUNCTION: ICD-10-CM

## 2019-11-23 DIAGNOSIS — E03.9 ACQUIRED HYPOTHYROIDISM: ICD-10-CM

## 2019-11-23 DIAGNOSIS — N18.4 BENIGN HYPERTENSION WITH CKD (CHRONIC KIDNEY DISEASE) STAGE IV (HCC): ICD-10-CM

## 2019-11-23 DIAGNOSIS — D84.9 IMMUNOSUPPRESSION (HCC): ICD-10-CM

## 2019-11-23 DIAGNOSIS — E10.21 DIABETIC NEPHROPATHY ASSOCIATED WITH TYPE 1 DIABETES MELLITUS (HCC): ICD-10-CM

## 2019-11-23 DIAGNOSIS — N18.4 CHRONIC KIDNEY DISEASE, STAGE 4 (SEVERE) (HCC): ICD-10-CM

## 2019-11-23 DIAGNOSIS — I10 HYPERTENSION: ICD-10-CM

## 2019-11-23 DIAGNOSIS — D64.9 ANEMIA: ICD-10-CM

## 2019-11-23 DIAGNOSIS — N18.4 ACUTE RENAL FAILURE SUPERIMPOSED ON STAGE 4 CHRONIC KIDNEY DISEASE (HCC): ICD-10-CM

## 2019-11-23 DIAGNOSIS — T86.898 PANCREATIC ABSCESS OF PANCREAS TRANSPLANT: ICD-10-CM

## 2019-11-23 DIAGNOSIS — C90.00 MULTIPLE MYELOMA NOT HAVING ACHIEVED REMISSION (HCC): ICD-10-CM

## 2019-11-23 DIAGNOSIS — R10.9 ABDOMINAL PAIN: Primary | ICD-10-CM

## 2019-11-23 DIAGNOSIS — K85.90 PANCREATIC ABSCESS OF PANCREAS TRANSPLANT: ICD-10-CM

## 2019-11-23 DIAGNOSIS — E11.65 UNCONTROLLED TYPE 2 DIABETES MELLITUS WITH HYPERGLYCEMIA (HCC): ICD-10-CM

## 2019-11-23 DIAGNOSIS — T86.10 RENAL TRANSPLANT DISORDER: ICD-10-CM

## 2019-11-23 DIAGNOSIS — N39.0 URINARY TRACT INFECTION WITHOUT HEMATURIA, SITE UNSPECIFIED: ICD-10-CM

## 2019-11-23 DIAGNOSIS — R19.7 DIARRHEA: ICD-10-CM

## 2019-11-23 DIAGNOSIS — I10 ESSENTIAL HYPERTENSION: ICD-10-CM

## 2019-11-23 DIAGNOSIS — I12.9 BENIGN HYPERTENSION WITH CKD (CHRONIC KIDNEY DISEASE) STAGE IV (HCC): ICD-10-CM

## 2019-11-23 DIAGNOSIS — N17.9 ACUTE RENAL FAILURE SUPERIMPOSED ON STAGE 4 CHRONIC KIDNEY DISEASE (HCC): ICD-10-CM

## 2019-11-23 DIAGNOSIS — N17.9 AKI (ACUTE KIDNEY INJURY) (HCC): ICD-10-CM

## 2019-11-23 DIAGNOSIS — I50.32 CHRONIC DIASTOLIC CONGESTIVE HEART FAILURE (HCC): ICD-10-CM

## 2019-11-23 LAB
ALBUMIN SERPL BCP-MCNC: 3 G/DL (ref 3.5–5)
ALP SERPL-CCNC: 104 U/L (ref 46–116)
ALT SERPL W P-5'-P-CCNC: 25 U/L (ref 12–78)
AMMONIA PLAS-SCNC: 37 UMOL/L (ref 11–35)
ANION GAP SERPL CALCULATED.3IONS-SCNC: 13 MMOL/L (ref 4–13)
AST SERPL W P-5'-P-CCNC: 16 U/L (ref 5–45)
BACTERIA UR QL AUTO: ABNORMAL /HPF
BASOPHILS # BLD AUTO: 0.11 THOUSANDS/ΜL (ref 0–0.1)
BASOPHILS NFR BLD AUTO: 2 % (ref 0–1)
BILIRUB SERPL-MCNC: 0.57 MG/DL (ref 0.2–1)
BILIRUB UR QL STRIP: NEGATIVE
BUN SERPL-MCNC: 44 MG/DL (ref 5–25)
CALCIUM SERPL-MCNC: 8.3 MG/DL (ref 8.3–10.1)
CHLORIDE SERPL-SCNC: 109 MMOL/L (ref 100–108)
CLARITY UR: CLEAR
CO2 SERPL-SCNC: 18 MMOL/L (ref 21–32)
COLOR UR: ABNORMAL
CREAT SERPL-MCNC: 3.2 MG/DL (ref 0.6–1.3)
EOSINOPHIL # BLD AUTO: 1.2 THOUSAND/ΜL (ref 0–0.61)
EOSINOPHIL NFR BLD AUTO: 18 % (ref 0–6)
ERYTHROCYTE [DISTWIDTH] IN BLOOD BY AUTOMATED COUNT: 17.4 % (ref 11.6–15.1)
GFR SERPL CREATININE-BSD FRML MDRD: 16 ML/MIN/1.73SQ M
GLUCOSE SERPL-MCNC: 111 MG/DL (ref 65–140)
GLUCOSE SERPL-MCNC: 88 MG/DL (ref 65–140)
GLUCOSE SERPL-MCNC: 96 MG/DL (ref 65–140)
GLUCOSE UR STRIP-MCNC: NEGATIVE MG/DL
HCT VFR BLD AUTO: 23.3 % (ref 34.8–46.1)
HGB BLD-MCNC: 7.1 G/DL (ref 11.5–15.4)
HGB UR QL STRIP.AUTO: ABNORMAL
IMM GRANULOCYTES # BLD AUTO: 0.06 THOUSAND/UL (ref 0–0.2)
IMM GRANULOCYTES NFR BLD AUTO: 1 % (ref 0–2)
KETONES UR STRIP-MCNC: NEGATIVE MG/DL
LEUKOCYTE ESTERASE UR QL STRIP: ABNORMAL
LIPASE SERPL-CCNC: 54 U/L (ref 73–393)
LYMPHOCYTES # BLD AUTO: 0.94 THOUSANDS/ΜL (ref 0.6–4.47)
LYMPHOCYTES NFR BLD AUTO: 14 % (ref 14–44)
MCH RBC QN AUTO: 31.7 PG (ref 26.8–34.3)
MCHC RBC AUTO-ENTMCNC: 30.5 G/DL (ref 31.4–37.4)
MCV RBC AUTO: 104 FL (ref 82–98)
MONOCYTES # BLD AUTO: 0.72 THOUSAND/ΜL (ref 0.17–1.22)
MONOCYTES NFR BLD AUTO: 11 % (ref 4–12)
NEUTROPHILS # BLD AUTO: 3.67 THOUSANDS/ΜL (ref 1.85–7.62)
NEUTS SEG NFR BLD AUTO: 54 % (ref 43–75)
NITRITE UR QL STRIP: NEGATIVE
NON-SQ EPI CELLS URNS QL MICRO: ABNORMAL /HPF
NRBC BLD AUTO-RTO: 0 /100 WBCS
PH UR STRIP.AUTO: 7.5 [PH]
PLATELET # BLD AUTO: 149 THOUSANDS/UL (ref 149–390)
PMV BLD AUTO: 12.4 FL (ref 8.9–12.7)
POTASSIUM SERPL-SCNC: 4.1 MMOL/L (ref 3.5–5.3)
PROT SERPL-MCNC: 7 G/DL (ref 6.4–8.2)
PROT UR STRIP-MCNC: ABNORMAL MG/DL
RBC # BLD AUTO: 2.24 MILLION/UL (ref 3.81–5.12)
RBC #/AREA URNS AUTO: ABNORMAL /HPF
SODIUM SERPL-SCNC: 140 MMOL/L (ref 136–145)
SP GR UR STRIP.AUTO: 1.01 (ref 1–1.03)
UROBILINOGEN UR QL STRIP.AUTO: 0.2 E.U./DL
WBC # BLD AUTO: 6.7 THOUSAND/UL (ref 4.31–10.16)
WBC #/AREA URNS AUTO: ABNORMAL /HPF

## 2019-11-23 PROCEDURE — 83690 ASSAY OF LIPASE: CPT | Performed by: EMERGENCY MEDICINE

## 2019-11-23 PROCEDURE — 99284 EMERGENCY DEPT VISIT MOD MDM: CPT

## 2019-11-23 PROCEDURE — 81001 URINALYSIS AUTO W/SCOPE: CPT | Performed by: EMERGENCY MEDICINE

## 2019-11-23 PROCEDURE — 85025 COMPLETE CBC W/AUTO DIFF WBC: CPT | Performed by: EMERGENCY MEDICINE

## 2019-11-23 PROCEDURE — 82140 ASSAY OF AMMONIA: CPT | Performed by: EMERGENCY MEDICINE

## 2019-11-23 PROCEDURE — 36415 COLL VENOUS BLD VENIPUNCTURE: CPT | Performed by: EMERGENCY MEDICINE

## 2019-11-23 PROCEDURE — 99285 EMERGENCY DEPT VISIT HI MDM: CPT | Performed by: EMERGENCY MEDICINE

## 2019-11-23 PROCEDURE — 82948 REAGENT STRIP/BLOOD GLUCOSE: CPT

## 2019-11-23 PROCEDURE — 80053 COMPREHEN METABOLIC PANEL: CPT | Performed by: EMERGENCY MEDICINE

## 2019-11-23 PROCEDURE — 96361 HYDRATE IV INFUSION ADD-ON: CPT

## 2019-11-23 PROCEDURE — 96374 THER/PROPH/DIAG INJ IV PUSH: CPT

## 2019-11-23 PROCEDURE — 99223 1ST HOSP IP/OBS HIGH 75: CPT | Performed by: HOSPITALIST

## 2019-11-23 RX ORDER — FOLIC ACID 1 MG/1
1000 TABLET ORAL DAILY
Status: DISCONTINUED | OUTPATIENT
Start: 2019-11-24 | End: 2019-12-06 | Stop reason: HOSPADM

## 2019-11-23 RX ORDER — TACROLIMUS 1 MG/1
3 CAPSULE ORAL EVERY 12 HOURS SCHEDULED
Status: DISCONTINUED | OUTPATIENT
Start: 2019-11-23 | End: 2019-11-24

## 2019-11-23 RX ORDER — METOPROLOL TARTRATE 50 MG/1
50 TABLET, FILM COATED ORAL ONCE
Status: COMPLETED | OUTPATIENT
Start: 2019-11-23 | End: 2019-11-23

## 2019-11-23 RX ORDER — SODIUM BICARBONATE 650 MG/1
1300 TABLET ORAL 3 TIMES DAILY
Status: DISCONTINUED | OUTPATIENT
Start: 2019-11-23 | End: 2019-12-06 | Stop reason: HOSPADM

## 2019-11-23 RX ORDER — DULOXETIN HYDROCHLORIDE 60 MG/1
60 CAPSULE, DELAYED RELEASE ORAL 2 TIMES DAILY
Status: DISCONTINUED | OUTPATIENT
Start: 2019-11-23 | End: 2019-12-06 | Stop reason: HOSPADM

## 2019-11-23 RX ORDER — HEPARIN SODIUM 5000 [USP'U]/ML
5000 INJECTION, SOLUTION INTRAVENOUS; SUBCUTANEOUS EVERY 8 HOURS SCHEDULED
Status: DISCONTINUED | OUTPATIENT
Start: 2019-11-23 | End: 2019-12-06 | Stop reason: HOSPADM

## 2019-11-23 RX ORDER — PREDNISONE 1 MG/1
5 TABLET ORAL DAILY
Status: DISCONTINUED | OUTPATIENT
Start: 2019-11-24 | End: 2019-12-06 | Stop reason: HOSPADM

## 2019-11-23 RX ORDER — FERROUS SULFATE 325(65) MG
325 TABLET ORAL
Status: DISCONTINUED | OUTPATIENT
Start: 2019-11-24 | End: 2019-12-06 | Stop reason: HOSPADM

## 2019-11-23 RX ORDER — CLONIDINE HYDROCHLORIDE 0.1 MG/1
0.3 TABLET ORAL EVERY 8 HOURS SCHEDULED
Status: DISCONTINUED | OUTPATIENT
Start: 2019-11-23 | End: 2019-12-06 | Stop reason: HOSPADM

## 2019-11-23 RX ORDER — INSULIN GLARGINE 100 [IU]/ML
5 INJECTION, SOLUTION SUBCUTANEOUS
Status: DISCONTINUED | OUTPATIENT
Start: 2019-11-23 | End: 2019-12-06 | Stop reason: HOSPADM

## 2019-11-23 RX ORDER — ONDANSETRON 2 MG/ML
4 INJECTION INTRAMUSCULAR; INTRAVENOUS ONCE
Status: COMPLETED | OUTPATIENT
Start: 2019-11-23 | End: 2019-11-23

## 2019-11-23 RX ORDER — ASPIRIN 81 MG/1
81 TABLET ORAL DAILY
Status: DISCONTINUED | OUTPATIENT
Start: 2019-11-24 | End: 2019-12-06 | Stop reason: HOSPADM

## 2019-11-23 RX ORDER — SEVELAMER HYDROCHLORIDE 800 MG/1
800 TABLET, FILM COATED ORAL
Status: DISCONTINUED | OUTPATIENT
Start: 2019-11-24 | End: 2019-12-06 | Stop reason: HOSPADM

## 2019-11-23 RX ORDER — HYDRALAZINE HYDROCHLORIDE 25 MG/1
50 TABLET, FILM COATED ORAL 3 TIMES DAILY
Status: DISCONTINUED | OUTPATIENT
Start: 2019-11-23 | End: 2019-11-25

## 2019-11-23 RX ORDER — SERTRALINE HYDROCHLORIDE 100 MG/1
200 TABLET, FILM COATED ORAL DAILY
Status: DISCONTINUED | OUTPATIENT
Start: 2019-11-24 | End: 2019-12-06 | Stop reason: HOSPADM

## 2019-11-23 RX ORDER — DOXAZOSIN MESYLATE 4 MG/1
4 TABLET ORAL
Status: DISCONTINUED | OUTPATIENT
Start: 2019-11-23 | End: 2019-11-25

## 2019-11-23 RX ORDER — METOPROLOL TARTRATE 50 MG/1
50 TABLET, FILM COATED ORAL EVERY 12 HOURS SCHEDULED
Status: DISCONTINUED | OUTPATIENT
Start: 2019-11-23 | End: 2019-11-25

## 2019-11-23 RX ORDER — LEVOTHYROXINE SODIUM 0.12 MG/1
125 TABLET ORAL
Status: DISCONTINUED | OUTPATIENT
Start: 2019-11-24 | End: 2019-12-06 | Stop reason: HOSPADM

## 2019-11-23 RX ORDER — ARIPIPRAZOLE 5 MG/1
20 TABLET ORAL
Status: DISCONTINUED | OUTPATIENT
Start: 2019-11-23 | End: 2019-12-06 | Stop reason: HOSPADM

## 2019-11-23 RX ORDER — MELATONIN
1000 3 TIMES DAILY
Status: DISCONTINUED | OUTPATIENT
Start: 2019-11-23 | End: 2019-12-06 | Stop reason: HOSPADM

## 2019-11-23 RX ORDER — BUSPIRONE HYDROCHLORIDE 5 MG/1
5 TABLET ORAL 2 TIMES DAILY
Status: DISCONTINUED | OUTPATIENT
Start: 2019-11-23 | End: 2019-12-06 | Stop reason: HOSPADM

## 2019-11-23 RX ORDER — LORAZEPAM 1 MG/1
2 TABLET ORAL 3 TIMES DAILY PRN
Status: DISCONTINUED | OUTPATIENT
Start: 2019-11-23 | End: 2019-12-06 | Stop reason: HOSPADM

## 2019-11-23 RX ORDER — AMLODIPINE BESYLATE 10 MG/1
10 TABLET ORAL EVERY MORNING
Status: DISCONTINUED | OUTPATIENT
Start: 2019-11-24 | End: 2019-11-24

## 2019-11-23 RX ORDER — PRAVASTATIN SODIUM 80 MG/1
80 TABLET ORAL DAILY
Status: DISCONTINUED | OUTPATIENT
Start: 2019-11-24 | End: 2019-12-06 | Stop reason: HOSPADM

## 2019-11-23 RX ADMIN — BUSPIRONE HYDROCHLORIDE 5 MG: 5 TABLET ORAL at 20:34

## 2019-11-23 RX ADMIN — ARIPIPRAZOLE 20 MG: 5 TABLET ORAL at 22:01

## 2019-11-23 RX ADMIN — DULOXETINE HYDROCHLORIDE 60 MG: 60 CAPSULE, DELAYED RELEASE ORAL at 20:33

## 2019-11-23 RX ADMIN — METOPROLOL TARTRATE 50 MG: 50 TABLET, FILM COATED ORAL at 15:42

## 2019-11-23 RX ADMIN — DOXAZOSIN 4 MG: 4 TABLET ORAL at 22:01

## 2019-11-23 RX ADMIN — INSULIN GLARGINE 5 UNITS: 100 INJECTION, SOLUTION SUBCUTANEOUS at 22:42

## 2019-11-23 RX ADMIN — SODIUM CHLORIDE 1000 ML: 0.9 INJECTION, SOLUTION INTRAVENOUS at 20:41

## 2019-11-23 RX ADMIN — ONDANSETRON 4 MG: 2 INJECTION INTRAMUSCULAR; INTRAVENOUS at 16:54

## 2019-11-23 RX ADMIN — CLONIDINE HYDROCHLORIDE 0.3 MG: 0.1 TABLET ORAL at 22:01

## 2019-11-23 RX ADMIN — CEFTRIAXONE SODIUM 1000 MG: 10 INJECTION, POWDER, FOR SOLUTION INTRAVENOUS at 20:33

## 2019-11-23 RX ADMIN — CHOLECALCIFEROL TAB 25 MCG (1000 UNIT) 1000 UNITS: 25 TAB at 20:34

## 2019-11-23 RX ADMIN — HYDRALAZINE HYDROCHLORIDE 50 MG: 25 TABLET ORAL at 20:34

## 2019-11-23 RX ADMIN — SODIUM CHLORIDE 1000 ML: 0.9 INJECTION, SOLUTION INTRAVENOUS at 16:54

## 2019-11-23 RX ADMIN — TACROLIMUS 3 MG: 1 CAPSULE ORAL at 21:57

## 2019-11-23 RX ADMIN — HEPARIN SODIUM 5000 UNITS: 5000 INJECTION INTRAVENOUS; SUBCUTANEOUS at 22:02

## 2019-11-23 RX ADMIN — SODIUM BICARBONATE 650 MG TABLET 1300 MG: at 20:33

## 2019-11-23 NOTE — ED PROVIDER NOTES
History  Chief Complaint   Patient presents with    Hypertension     Pt presents to the ED after a visiting nurse checked her vitals, reported a high BP and temperature  Pt had hinojosa removed, needs intermittent straight cathing  Pt reports confusion, pt is jaundiced  Hx pancreatic and liver transplant  Patient referred to the emergency department after a home visiting nurse found her to be hypertensive and malaise and confused above baseline  Patient has a history of multiple myeloma with pancreatic and kidney transplant  Patient denies fever chills  She also had a catheter removed yesterday by Urology secondary to intermittent retention  She is supposed to intermittently self calf as needed  She denies chest pain sob  She has mild nausea but no vomiting or diarrhea  No fever chills rash  She denies trauma fall or injury  She denies urinary symptoms  Prior to Admission Medications   Prescriptions Last Dose Informant Patient Reported? Taking?    ARIPiprazole (ABILIFY) 30 mg tablet  Self No Yes   Sig: Take 1 tablet (30 mg total) by mouth daily at bedtime for 180 days   Cholecalciferol (VITAMIN D3) 1000 units CAPS  Self Yes Yes   Sig: Take 3 capsules by mouth daily     DULoxetine (CYMBALTA) 60 mg delayed release capsule  Self No Yes   Sig: Take 1 capsule (60 mg total) by mouth 2 (two) times a day for 180 days   Insulin Glargine (TOUJEO SOLOSTAR) 300 units/mL CONCETRATED U-300 injection pen  Self No Yes   Sig: Inject 1 Units under the skin daily at bedtime   Insulin Pen Needle (BD PEN NEEDLE VISHNU U/F) 32G X 4 MM MISC  Self No Yes   Sig: Use 4 times daily   LORazepam (ATIVAN) 2 mg tablet   No Yes   Sig: Take 1 tablet (2 mg total) by mouth 3 (three) times a day as needed for anxiety   Lancets (ONETOUCH ULTRASOFT) lancets  Self Yes Yes   Sig: by Does not apply route 4 (four) times a day     amLODIPine (NORVASC) 10 mg tablet  Self No Yes   Sig: TAKE 1 TABLET(10MG) BY MOUTH EVERY MORNING AND 1/2 TABLET(5MG) EVERY EVENING   Patient taking differently: Take 10 mg by mouth every morning TAKE 1 TABLET(10MG) BY MOUTH EVERY MORNING AND 1/2 TABLET(5MG) EVERY EVENING   aspirin 81 MG tablet  Self Yes Yes   Sig: Take 1 tablet by mouth daily   busPIRone (BUSPAR) 5 mg tablet  Self No Yes   Sig: Take 1 tablet (5 mg total) by mouth 2 (two) times a day for 180 days   cloNIDine (CATAPRES) 0 1 mg tablet  Self No Yes   Sig: TAKE 3 TABLETS BY MOUTH EVERY MORNING, 3 TABLETS AT NOON, AND 3 TABLETS EVERY EVENING   Patient taking differently: Take 0 3 mg by mouth every 8 (eight) hours TAKE 3 TABLETS BY MOUTH EVERY MORNING, 3 TABLETS AT NOON, AND 3 TABLETS EVERY EVENING   doxazosin (CARDURA) 4 mg tablet  Self No Yes   Sig: Take 1 tablet (4 mg total) by mouth daily at bedtime   ferrous sulfate 325 (65 Fe) mg tablet  Self No Yes   Sig: Take 1 tablet (325 mg total) by mouth daily with breakfast   folic acid (FOLVITE) 1 mg tablet  Self No Yes   Sig: TAKE 1 TABLET BY MOUTH DAILY AS DIRECTED   hydrALAZINE (APRESOLINE) 50 mg tablet   No Yes   Sig: TAKE 1 TABLET(50 MG TOTAL) BY MOUTH THREE TIMES DAILY FOR 90 DAYS   insulin lispro (HUMALOG KWIKPEN) 100 units/mL injection pen  Self No Yes   Sig: INJECT 10 UNDER THE SKIN EVERY DAY OR AS DIRECTED   Patient taking differently: INJECT 10 UNDER THE SKIN EVERY DAY OR AS DIRECTED   levothyroxine 125 mcg tablet  Self No Yes   Sig: Take 1 tablet (125 mcg total) by mouth daily   metoprolol tartrate (LOPRESSOR) 50 mg tablet  Self No Yes   Sig: TAKE 1 TABLET(50 MG TOTAL) BY MOUTH TWICE DAILY   pravastatin (PRAVACHOL) 80 mg tablet  Self No Yes   Sig: TAKE 1 TABLET BY MOUTH DAILY   predniSONE 5 mg tablet  Self No Yes   Sig: Take 1 tablet (5 mg total) by mouth daily   rOPINIRole (REQUIP) 0 25 mg tablet   No Yes   Sig: TAKE 1 TABLET BY MOUTH TWICE DAILY   sertraline (ZOLOFT) 100 mg tablet  Self No Yes   Sig: Take 2 tablets (200 mg total) by mouth daily for 180 days   sevelamer carbonate (RENVELA) 800 mg tablet  Self No Yes   Sig: Take 1 tablet (800 mg total) by mouth 3 (three) times a day with meals   sodium bicarbonate 650 mg tablet  Self No Yes   Sig: TAKE 2 TABLETS BY MOUTH THREE TIMES DAILY   tacrolimus (PROGRAF) 1 mg capsule  Self No Yes   Sig: Take 3 mg in AM and 2 mg in PM (6/18/19)   Patient taking differently: Take 3 mg by mouth every 12 (twelve) hours       Facility-Administered Medications: None       Past Medical History:   Diagnosis Date    Abnormal liver function test     [facility] kidney [facility] ([facility] [address] [facility]     Acute on chronic congestive heart failure (HCC)     Allergic urticaria     Anemia     Cancer (HCC)     Multiple myeloma    Cervical dysplasia     Cholelithiasis     Chronic diastolic (congestive) heart failure ([facility] [address] ) 9/18/2017    Diabetes mellitus ([facility] [address] )     Previous, controlled with diet    Diabetes mellitus with foot ulcer ([facility] [address] )     Disease of thyroid gland     Encephalopathy     Hematuria     + leak est- secondary to UTIs/panc drainage    History of transfusion     Hyperkalemia     Hypertension     Iliotibial band syndrome     Lumbar radiculopathy     Multiple myeloma (HCC)     Multiple myeloma ([facility] [address] [facility]     Night blindness     Nonrheumatic aortic (valve) insufficiency     Pneumonia     Renal disorder     Retinopathy     Seborrhea     Seizure ([address] )     Shingles     Sinus tachycardia     B blocker - cardio echo stress test 02 normal/neg LE doppler 2/02 OK and 12/07    Status post simultaneous kidney and pancreas transplant ([facility] [address] )     Toe amputation status     Trochanteric bursitis        Past Surgical History:   Procedure Laterality Date    CATARACT EXTRACTION      CHOLECYSTECTOMY      COLONOSCOPY      two polyps in the rectum removed and biopsied diverticulosis in the sigmoid colon, external hemorrhoiods- Dr Barfield    COMBINED KIDNEY-PANCREAS TRANSPLANT N/A     CT BONE MARROW BIOPSY AND ASPIRATION  4/17/2019    CYSTOSCOPY N/A 10/13/2016    Procedure: CYSTOSCOPY, retrograde pyelogram, biopsy of ureteral polyp; Surgeon: Adelfo Ramirez MD;  Location: [facility];  Service:    Tyler Torrez DILATION AND CURETTAGE OF UTERUS      ESOPHAGOGASTRODUODENOSCOPY N/A 2017    Procedure: ESOPHAGOGASTRODUODENOSCOPY (EGD); Surgeon: Quoc Crook DO;  Location: [facility]; Service: Gastroenterology    EYE SURGERY      cataracts    FOOT AMPUTATION THROUGH METATARSAL Left     FOOT SURGERY Right     excision of metatarsal heads    HALLUX VALGUS CORRECTION Right     NEPHRECTOMY TRANSPLANTED ORGAN      PANCREATIC TRANSPLANT REMOVAL         Family History   Problem Relation Age of Onset    Hypertension Mother     Cancer Mother     Hypertension Father     Cancer Father     Cancer Maternal Grandfather     Cancer Paternal Grandmother     Cancer Paternal Grandfather     Depression Sister     Breast cancer Maternal Grandmother 77     I have reviewed and agree with the history as documented  Social History     Tobacco Use    Smoking status: Former Smoker     Last attempt to quit: 2012     Years since quittin 5    Smokeless tobacco: Never Used   Substance Use Topics    Alcohol use: Never     Frequency: Never     Binge frequency: Never     Comment: (history)    Drug use: Never     Types: Hydrocodone     Comment: Past cocaine use many years ago - no current use        Review of Systems   Constitutional: Positive for activity change, appetite change and fatigue  Negative for chills, diaphoresis and fever  HENT: Negative  Negative for congestion, drooling, ear discharge, ear pain, sinus pressure, sneezing, trouble swallowing and voice change  Eyes: Negative  Negative for photophobia and visual disturbance  Respiratory: Negative  Negative for cough, chest tightness, shortness of breath, wheezing and stridor  Cardiovascular: Negative  Negative for chest pain, palpitations and leg swelling  Gastrointestinal: Positive for nausea   Negative for abdominal distention, abdominal pain, anal bleeding, blood in stool, diarrhea and vomiting  Endocrine: Negative  Genitourinary: Negative  Negative for difficulty urinating, dysuria, flank pain, frequency, urgency, vaginal bleeding, vaginal discharge and vaginal pain  Musculoskeletal: Negative  Negative for back pain, myalgias, neck pain and neck stiffness  Skin: Negative  Negative for rash and wound  Allergic/Immunologic: Negative  Neurological: Positive for weakness  Negative for dizziness, tremors, seizures, facial asymmetry, speech difficulty, light-headedness, numbness and headaches  Hematological: Negative  Does not bruise/bleed easily  Psychiatric/Behavioral: Negative  Negative for confusion  Physical Exam  Physical Exam   Constitutional: She is oriented to person, place, and time  She appears well-developed and well-nourished  No distress  Nontoxic appearance  No respiratory distress  Patient does appear mildly washed out and is slow to answer questions  She follows simple commands and moves all extremities spontaneously and to command  She does not appear to be in pain  HENT:   Head: Normocephalic and atraumatic  Right Ear: External ear normal    Left Ear: External ear normal    Nose: Nose normal    Mouth/Throat: Oropharynx is clear and moist  No oropharyngeal exudate  No visible evidence of head scalp or facial trauma   Eyes: Pupils are equal, round, and reactive to light  Conjunctivae and EOM are normal  Scleral icterus is present  Neck: Normal range of motion  Neck supple  Cardiovascular: Normal rate, regular rhythm, normal heart sounds and intact distal pulses  Pulmonary/Chest: Effort normal and breath sounds normal  No stridor  No respiratory distress  She has no wheezes  She has no rales  She exhibits no tenderness  Abdominal: Soft  Bowel sounds are normal  She exhibits no distension and no mass  There is no tenderness  There is no rebound and no guarding   No hernia  No reproducible tenderness to palpation  No peritoneal signs masses or hernias  Musculoskeletal: Normal range of motion  She exhibits no edema, tenderness or deformity  Neurological: She is alert and oriented to person, place, and time  She has normal reflexes  She displays normal reflexes  No cranial nerve deficit or sensory deficit  She exhibits normal muscle tone  Coordination normal    Skin: Skin is warm and dry  Capillary refill takes less than 2 seconds  No rash noted  She is not diaphoretic  No erythema  No pallor  Jaundiced skin   Psychiatric: She has a normal mood and affect  Her behavior is normal  Judgment and thought content normal    Nursing note and vitals reviewed        Vital Signs  ED Triage Vitals   Temperature Pulse Respirations Blood Pressure SpO2   11/23/19 1453 11/23/19 1453 11/23/19 1454 11/23/19 1453 11/23/19 1453   98 8 °F (37 1 °C) 70 19 (!) 202/81 96 %      Temp Source Heart Rate Source Patient Position - Orthostatic VS BP Location FiO2 (%)   11/23/19 1453 11/23/19 1453 11/23/19 1552 11/23/19 1453 --   Oral Monitor Sitting Right arm       Pain Score       --                  Vitals:    11/23/19 1453 11/23/19 1552   BP: (!) 202/81 (!) 201/77   Pulse: 70 68   Patient Position - Orthostatic VS:  Sitting         Visual Acuity      ED Medications  Medications   sodium chloride 0 9 % bolus 1,000 mL (has no administration in time range)   ondansetron (ZOFRAN) injection 4 mg (has no administration in time range)   metoprolol tartrate (LOPRESSOR) tablet 50 mg (50 mg Oral Given 11/23/19 1542)       Diagnostic Studies  Results Reviewed     Procedure Component Value Units Date/Time    Ammonia [[id number]]     Lab Status:  No result Specimen:  Blood     Comprehensive metabolic panel [[id number]]     Lab Status:  No result Specimen:  Blood     Lipase [[id number]]     Lab Status:  No result Specimen:  Blood     CBC and differential [[id number]]     Lab Status:  No result Specimen:  Blood UA (URINE) with reflex to Scope [[id number]]     Lab Status:  No result Specimen:  Urine                  No orders to display              Procedures  Procedures       ED Course  ED Course as of Nov 23 1646   Sat Nov 23, 2019   1637 [facility]  [facility] when lab work and CT scan back  Patient is stable at this time and not requesting pain management  Fluids and Zofran ordered  MDM    Disposition  Final diagnoses:   Abdominal pain   Hypertension   Renal transplant disorder   Pancreatic abscess of pancreas transplant     Time reflects when diagnosis was documented in both MDM as applicable and the Disposition within this note     Time User Action Codes Description Comment    11/23/2019  4:39 PM Keesha Radha Add [R10 9] Abdominal pain     11/23/2019  4:39 PM Keesha Radha Add [I10] Hypertension     11/23/2019  4:39 PM Keesha Radha Add [T86 10] Renal transplant disorder     11/23/2019  4:39 PM Porsche Harmon Add [T86 [id number],  K85 90] Pancreatic abscess of pancreas transplant       ED Disposition     None      Follow-up Information    None         Patient's Medications   Discharge Prescriptions    No medications on file     No discharge procedures on file      ED Provider  Electronically Signed by           Hilda Vo MD  11/23/19 [id number]

## 2019-11-24 LAB
ALBUMIN SERPL BCP-MCNC: 2.9 G/DL (ref 3.5–5)
ALP SERPL-CCNC: 104 U/L (ref 46–116)
ALT SERPL W P-5'-P-CCNC: 17 U/L (ref 12–78)
ANION GAP SERPL CALCULATED.3IONS-SCNC: 15 MMOL/L (ref 4–13)
AST SERPL W P-5'-P-CCNC: 14 U/L (ref 5–45)
BILIRUB SERPL-MCNC: 0.5 MG/DL (ref 0.2–1)
BUN SERPL-MCNC: 42 MG/DL (ref 5–25)
CALCIUM SERPL-MCNC: 7.9 MG/DL (ref 8.3–10.1)
CHLORIDE SERPL-SCNC: 111 MMOL/L (ref 100–108)
CO2 SERPL-SCNC: 17 MMOL/L (ref 21–32)
CREAT SERPL-MCNC: 3.05 MG/DL (ref 0.6–1.3)
ERYTHROCYTE [DISTWIDTH] IN BLOOD BY AUTOMATED COUNT: 17.5 % (ref 11.6–15.1)
GFR SERPL CREATININE-BSD FRML MDRD: 17 ML/MIN/1.73SQ M
GLUCOSE SERPL-MCNC: 131 MG/DL (ref 65–140)
GLUCOSE SERPL-MCNC: 153 MG/DL (ref 65–140)
GLUCOSE SERPL-MCNC: 83 MG/DL (ref 65–140)
GLUCOSE SERPL-MCNC: 86 MG/DL (ref 65–140)
GLUCOSE SERPL-MCNC: 91 MG/DL (ref 65–140)
HCT VFR BLD AUTO: 23.8 % (ref 34.8–46.1)
HGB BLD-MCNC: 7.1 G/DL (ref 11.5–15.4)
MCH RBC QN AUTO: 31.7 PG (ref 26.8–34.3)
MCHC RBC AUTO-ENTMCNC: 29.8 G/DL (ref 31.4–37.4)
MCV RBC AUTO: 106 FL (ref 82–98)
PLATELET # BLD AUTO: 147 THOUSANDS/UL (ref 149–390)
PMV BLD AUTO: 12.8 FL (ref 8.9–12.7)
POTASSIUM SERPL-SCNC: 4.3 MMOL/L (ref 3.5–5.3)
PROT SERPL-MCNC: 7 G/DL (ref 6.4–8.2)
RBC # BLD AUTO: 2.24 MILLION/UL (ref 3.81–5.12)
SODIUM SERPL-SCNC: 143 MMOL/L (ref 136–145)
WBC # BLD AUTO: 6.48 THOUSAND/UL (ref 4.31–10.16)

## 2019-11-24 PROCEDURE — 85027 COMPLETE CBC AUTOMATED: CPT | Performed by: PSYCHIATRY & NEUROLOGY

## 2019-11-24 PROCEDURE — 80053 COMPREHEN METABOLIC PANEL: CPT | Performed by: PSYCHIATRY & NEUROLOGY

## 2019-11-24 PROCEDURE — 87077 CULTURE AEROBIC IDENTIFY: CPT | Performed by: INTERNAL MEDICINE

## 2019-11-24 PROCEDURE — 82948 REAGENT STRIP/BLOOD GLUCOSE: CPT

## 2019-11-24 PROCEDURE — 99223 1ST HOSP IP/OBS HIGH 75: CPT | Performed by: INTERNAL MEDICINE

## 2019-11-24 PROCEDURE — 87086 URINE CULTURE/COLONY COUNT: CPT | Performed by: INTERNAL MEDICINE

## 2019-11-24 PROCEDURE — 99232 SBSQ HOSP IP/OBS MODERATE 35: CPT | Performed by: INTERNAL MEDICINE

## 2019-11-24 PROCEDURE — 87181 SC STD AGAR DILUTION PER AGT: CPT | Performed by: INTERNAL MEDICINE

## 2019-11-24 PROCEDURE — 87186 SC STD MICRODIL/AGAR DIL: CPT | Performed by: INTERNAL MEDICINE

## 2019-11-24 RX ORDER — TACROLIMUS 1 MG/1
3 CAPSULE ORAL DAILY
Status: DISCONTINUED | OUTPATIENT
Start: 2019-11-25 | End: 2019-12-06 | Stop reason: HOSPADM

## 2019-11-24 RX ORDER — ONDANSETRON 2 MG/ML
4 INJECTION INTRAMUSCULAR; INTRAVENOUS EVERY 8 HOURS PRN
Status: DISCONTINUED | OUTPATIENT
Start: 2019-11-24 | End: 2019-12-06 | Stop reason: HOSPADM

## 2019-11-24 RX ORDER — ACETAMINOPHEN 325 MG/1
650 TABLET ORAL EVERY 6 HOURS PRN
Status: DISCONTINUED | OUTPATIENT
Start: 2019-11-24 | End: 2019-12-06 | Stop reason: HOSPADM

## 2019-11-24 RX ORDER — SODIUM CHLORIDE, SODIUM GLUCONATE, SODIUM ACETATE, POTASSIUM CHLORIDE, MAGNESIUM CHLORIDE, SODIUM PHOSPHATE, DIBASIC, AND POTASSIUM PHOSPHATE .53; .5; .37; .037; .03; .012; .00082 G/100ML; G/100ML; G/100ML; G/100ML; G/100ML; G/100ML; G/100ML
50 INJECTION, SOLUTION INTRAVENOUS CONTINUOUS
Status: DISPENSED | OUTPATIENT
Start: 2019-11-24 | End: 2019-11-24

## 2019-11-24 RX ORDER — AMLODIPINE BESYLATE 10 MG/1
10 TABLET ORAL EVERY MORNING
Status: DISCONTINUED | OUTPATIENT
Start: 2019-11-25 | End: 2019-12-06 | Stop reason: HOSPADM

## 2019-11-24 RX ORDER — TACROLIMUS 1 MG/1
2 CAPSULE ORAL
Status: DISCONTINUED | OUTPATIENT
Start: 2019-11-24 | End: 2019-12-06 | Stop reason: HOSPADM

## 2019-11-24 RX ADMIN — LORAZEPAM 2 MG: 1 TABLET ORAL at 22:37

## 2019-11-24 RX ADMIN — SODIUM BICARBONATE 650 MG TABLET 1300 MG: at 16:02

## 2019-11-24 RX ADMIN — HYDRALAZINE HYDROCHLORIDE 50 MG: 25 TABLET ORAL at 16:02

## 2019-11-24 RX ADMIN — HEPARIN SODIUM 5000 UNITS: 5000 INJECTION INTRAVENOUS; SUBCUTANEOUS at 05:34

## 2019-11-24 RX ADMIN — HYDRALAZINE HYDROCHLORIDE 50 MG: 25 TABLET ORAL at 22:03

## 2019-11-24 RX ADMIN — DULOXETINE HYDROCHLORIDE 60 MG: 60 CAPSULE, DELAYED RELEASE ORAL at 09:04

## 2019-11-24 RX ADMIN — DULOXETINE HYDROCHLORIDE 60 MG: 60 CAPSULE, DELAYED RELEASE ORAL at 17:56

## 2019-11-24 RX ADMIN — DOXAZOSIN 4 MG: 4 TABLET ORAL at 22:02

## 2019-11-24 RX ADMIN — SODIUM BICARBONATE 650 MG TABLET 1300 MG: at 22:03

## 2019-11-24 RX ADMIN — TACROLIMUS 3 MG: 1 CAPSULE ORAL at 09:04

## 2019-11-24 RX ADMIN — METOPROLOL TARTRATE 50 MG: 50 TABLET, FILM COATED ORAL at 09:04

## 2019-11-24 RX ADMIN — ONDANSETRON 4 MG: 2 INJECTION INTRAMUSCULAR; INTRAVENOUS at 16:02

## 2019-11-24 RX ADMIN — SERTRALINE HYDROCHLORIDE 200 MG: 100 TABLET ORAL at 09:05

## 2019-11-24 RX ADMIN — FERROUS SULFATE TAB 325 MG (65 MG ELEMENTAL FE) 325 MG: 325 (65 FE) TAB at 09:04

## 2019-11-24 RX ADMIN — CLONIDINE HYDROCHLORIDE 0.3 MG: 0.1 TABLET ORAL at 14:25

## 2019-11-24 RX ADMIN — LEVOTHYROXINE SODIUM 125 MCG: 125 TABLET ORAL at 05:34

## 2019-11-24 RX ADMIN — SODIUM BICARBONATE 650 MG TABLET 1300 MG: at 09:04

## 2019-11-24 RX ADMIN — HEPARIN SODIUM 5000 UNITS: 5000 INJECTION INTRAVENOUS; SUBCUTANEOUS at 22:03

## 2019-11-24 RX ADMIN — INSULIN LISPRO 1 UNITS: 100 INJECTION, SOLUTION INTRAVENOUS; SUBCUTANEOUS at 17:10

## 2019-11-24 RX ADMIN — ARIPIPRAZOLE 20 MG: 5 TABLET ORAL at 22:02

## 2019-11-24 RX ADMIN — SEVELAMER HYDROCHLORIDE 800 MG: 800 TABLET, FILM COATED PARENTERAL at 09:05

## 2019-11-24 RX ADMIN — CLONIDINE HYDROCHLORIDE 0.3 MG: 0.1 TABLET ORAL at 05:32

## 2019-11-24 RX ADMIN — CHOLECALCIFEROL TAB 25 MCG (1000 UNIT) 1000 UNITS: 25 TAB at 09:04

## 2019-11-24 RX ADMIN — FOLIC ACID 1000 MCG: 1 TABLET ORAL at 09:04

## 2019-11-24 RX ADMIN — METOPROLOL TARTRATE 50 MG: 50 TABLET, FILM COATED ORAL at 22:04

## 2019-11-24 RX ADMIN — HYDRALAZINE HYDROCHLORIDE 50 MG: 25 TABLET ORAL at 09:04

## 2019-11-24 RX ADMIN — CLONIDINE HYDROCHLORIDE 0.3 MG: 0.1 TABLET ORAL at 22:03

## 2019-11-24 RX ADMIN — PRAVASTATIN SODIUM 80 MG: 80 TABLET ORAL at 09:04

## 2019-11-24 RX ADMIN — SEVELAMER HYDROCHLORIDE 800 MG: 800 TABLET, FILM COATED PARENTERAL at 16:05

## 2019-11-24 RX ADMIN — ASPIRIN 81 MG: 81 TABLET, COATED ORAL at 09:04

## 2019-11-24 RX ADMIN — PREDNISONE 5 MG: 5 TABLET ORAL at 09:04

## 2019-11-24 RX ADMIN — ACETAMINOPHEN 650 MG: 325 TABLET, FILM COATED ORAL at 17:56

## 2019-11-24 RX ADMIN — INSULIN GLARGINE 5 UNITS: 100 INJECTION, SOLUTION SUBCUTANEOUS at 22:02

## 2019-11-24 RX ADMIN — CHOLECALCIFEROL TAB 25 MCG (1000 UNIT) 1000 UNITS: 25 TAB at 22:03

## 2019-11-24 RX ADMIN — CEFEPIME HYDROCHLORIDE 1000 MG: 1 INJECTION, POWDER, FOR SOLUTION INTRAMUSCULAR; INTRAVENOUS at 13:04

## 2019-11-24 RX ADMIN — CHOLECALCIFEROL TAB 25 MCG (1000 UNIT) 1000 UNITS: 25 TAB at 16:02

## 2019-11-24 RX ADMIN — AMLODIPINE BESYLATE 10 MG: 10 TABLET ORAL at 09:05

## 2019-11-24 RX ADMIN — ONDANSETRON 4 MG: 2 INJECTION INTRAMUSCULAR; INTRAVENOUS at 05:34

## 2019-11-24 RX ADMIN — SEVELAMER HYDROCHLORIDE 800 MG: 800 TABLET, FILM COATED PARENTERAL at 12:52

## 2019-11-24 RX ADMIN — HEPARIN SODIUM 5000 UNITS: 5000 INJECTION INTRAVENOUS; SUBCUTANEOUS at 14:25

## 2019-11-24 RX ADMIN — BUSPIRONE HYDROCHLORIDE 5 MG: 5 TABLET ORAL at 17:56

## 2019-11-24 RX ADMIN — SODIUM CHLORIDE, SODIUM GLUCONATE, SODIUM ACETATE, POTASSIUM CHLORIDE AND MAGNESIUM CHLORIDE 50 ML/HR: 526; 502; 368; 37; 30 INJECTION, SOLUTION INTRAVENOUS at 12:49

## 2019-11-24 RX ADMIN — BUSPIRONE HYDROCHLORIDE 5 MG: 5 TABLET ORAL at 09:05

## 2019-11-24 NOTE — ASSESSMENT & PLAN NOTE
Renal and pancreatic transplant recipient in 1998      Continue tacrolimus and prednisone  Monitor creatinine

## 2019-11-24 NOTE — ASSESSMENT & PLAN NOTE
Patient with chronic kidney disease and as a result of chronic anemia  Hemoglobin 7 3 at baseline  Plan:  · Monitor CBC  in a m    · Take oral ferrous sulfate

## 2019-11-24 NOTE — H&P
H&P- Lance Means 1965, 53 y o  female MRN: [id number]    Unit/Bed#: S -01 Encounter: [id number]    Primary Care Provider: Sydney Mendoza MD   Date and time admitted to hospital: 11/23/2019  2:47 PM        * Recurrent UTI  Assessment & Plan  Patient with past medical history of recurrent UTIs  Admitted 11/05 to [facility] for similar complaints  At the time patient was placed in a Moreno catheter  It was a term by Urology but given the patient's recurrent UTIs and distended bladder she would benefit from keeping the Moreno with follow up as outpatient for voiding trial and self-catheterization teaching  Patient's catheter was removed 1 day prior to admission [facility]  Today patient noticed hazy color of urine and foul smell  Plan:  · Start ceftriaxone IV  · Wait for urine culture sensitivities to return    Multiple myeloma not having achieved remission [facility])  Assessment & Plan  Patient with past medical history of multiple myeloma not having achieved remission  Plan:  · Continue chemotherapy regimen     Anemia  Assessment & Plan  Patient with chronic kidney disease and as a result of chronic anemia  Hemoglobin 7 3 at baseline  Plan:  · Monitor CBC  in a m  · Take oral ferrous sulfate    Essential hypertension  Assessment & Plan  Monitor daily BP and ontinue home medications:  · Amlodipine 10 mg as directed  · Clonidine 0 3 mg as directed  · Doxazosin 4 mg as directed  · Hydralazine 50 mg as directed    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 [facility])  Assessment & Plan  Lab Results   Component Value Date    HGBA1C 5 1 09/09/2019       Recent Labs     11/23/19  1937   POCGLU 88       Blood Sugar Average: Last 72 hrs:  (P) 88   · Start Lantus 5 units HS  · Start insulin sliding scale  · Appreciate Endocrinology recommendations    Chronic kidney disease, stage 4 (severe) ([facility] )  Assessment & Plan  Patient with past medical history of renal transplant in 1998  Patient's baseline creatinine is 2 4-2 8  On admission patient's creatinine is at 3 2  Plan:  · Continue 0 9% normal saline  · Monitor creatinine levels    Renal transplant recipient  Assessment & Plan  Renal and pancreatic transplant recipient in 1998  Plan:  · Continue tacrolimus and prednisone  · Monitor creatinine        VTE Prophylaxis: Heparin  / sequential compression device   Code Status: full code  POLST: POLST form is not discussed and not completed at this time  Anticipated Length of Stay:  Patient will be admitted on an Inpatient basis with an anticipated length of stay of  2 midnights  Justification for Hospital Stay:    Chief Complaint:  UTI and HTN     History of Present Illness: Candelario Gonzales is a 53 y o  female with past medical history of multiple myeloma , CKD stage 4, anemia, insulin-dependent type 1 diabetes mellitus , hypertension , renal and pancreatic transplant and recurring UTIs who presents to the hospital after visiting nurse noted hypertension and an increased temperature  Patient was recently admitted to [facility] on 11/05 with complaints of dysuria  At the time patient was found to have a urinary tract infection  Given the recurrence of her urinary tract infections and her complicated renal transplant history ,urology and Infectious Disease were consulted  Urology decided to place the patient on a Moreno catheter and patient was discharged on this with Keflex as an outpatient  Patient was to follow up 11/22 as an outpatient to for removal of the catheter, a voiding trial and to teach self-catheterization  This morning patient called visiting home nurse and she was having difficulties with self-catheterization  The nurse found the patient to be hypertensive and with an increase temperature  In the emergency department patient states that she has back pain, nausea, stomach pain, changes in the color of her urine and foul-smelling urine    Patient's blood pressure on arrival was 202/81 and patient was given a dose of Lopressor, she was put on a saline drip and started on Zofran  Lab work showed a creatinine of 3 2 and urinalysis showed presence of leukocytes  Review of Systems:    Review of Systems    Past Medical and Surgical History:     Past Medical History:   Diagnosis Date    Abnormal liver function test     Acute kidney injury ([facility] )     Acute on chronic congestive heart failure (HCC)     Allergic urticaria     Anemia     Cancer ([facility] )     Multiple myeloma    Cervical dysplasia     Cholelithiasis     Chronic diastolic (congestive) heart failure ([facility] ) 9/18/2017    Diabetes mellitus (HCC)     Previous, controlled with diet    [facility] with [facility]     Disease of thyroid gland     Encephalopathy     Hematuria     + leak est- secondary to UTIs/panc drainage    History of transfusion     Hyperkalemia     Hypertension     Iliotibial band syndrome     Lumbar radiculopathy     Multiple myeloma (HCC)     Multiple myeloma (HCC)     Night blindness     Nonrheumatic aortic (valve) insufficiency     Pneumonia     Renal disorder     Retinopathy     Seborrhea     Seizure [facility]     Shingles     Sinus tachycardia     B blocker - cardio echo stress test 02 normal/neg LE doppler 2/02 OK and 12/07    Status post simultaneous kidney and pancreas transplant ([facility] )     Toe amputation status     Trochanteric bursitis        Past Surgical History:   Procedure Laterality Date    CATARACT EXTRACTION      CHOLECYSTECTOMY      COLONOSCOPY      two polyps in the rectum removed and biopsied diverticulosis in the sigmoid colon, external hemorrhoiods- Dr Barfield    COMBINED KIDNEY-PANCREAS TRANSPLANT N/A     CT BONE MARROW BIOPSY AND ASPIRATION  4/17/2019    CYSTOSCOPY N/A 10/13/2016    Procedure: CYSTOSCOPY, retrograde pyelogram, biopsy of ureteral polyp;   Surgeon: Nilda Ramon MD;  [facility]: [facility];  Service:    Aurelia Slider AND CURETTAGE OF UTERUS      ESOPHAGOGASTRODUODENOSCOPY N/A 11/20/2017    Procedure: ESOPHAGOGASTRODUODENOSCOPY (EGD); Surgeon: Lauren Silva DO;  Location: [facility]; Service: Gastroenterology    EYE SURGERY      cataracts    FOOT AMPUTATION THROUGH METATARSAL Left     FOOT SURGERY Right     excision of metatarsal heads    HALLUX VALGUS CORRECTION Right     NEPHRECTOMY TRANSPLANTED ORGAN      PANCREATIC TRANSPLANT REMOVAL  1998       Meds/Allergies:    Prior to Admission medications    Medication Sig Start Date End Date Taking?  Authorizing Provider   amLODIPine (NORVASC) 10 mg tablet TAKE 1 TABLET(10MG) BY MOUTH EVERY MORNING AND 1/2 TABLET(5MG) EVERY EVENING  Patient taking differently: Take 10 mg by mouth every morning TAKE 1 TABLET(10MG) BY MOUTH EVERY MORNING AND 1/2 TABLET(5MG) EVERY EVENING 4/8/19  Yes Jorge Crystal MD   ARIPiprazole (ABILIFY) 30 mg tablet Take 1 tablet (30 mg total) by mouth daily at bedtime for 180 days 6/5/19 12/2/19 Yes Freida Bravo MD   aspirin 81 MG tablet Take 1 tablet by mouth daily 6/5/13  Yes Historical Provider, MD   busPIRone (BUSPAR) 5 mg tablet Take 1 tablet (5 mg total) by mouth 2 (two) times a day for 180 days 6/5/19 12/2/19 Yes Freida Bravo MD   Cholecalciferol (VITAMIN D3) 1000 units CAPS Take 3 capsules by mouth daily     Yes Historical Provider, MD   cloNIDine (CATAPRES) 0 1 mg tablet TAKE 3 TABLETS BY MOUTH EVERY MORNING, 3 TABLETS AT NOON, AND 3 TABLETS EVERY EVENING  Patient taking differently: Take 0 3 mg by mouth every 8 (eight) hours TAKE 3 TABLETS BY MOUTH EVERY MORNING, 3 TABLETS AT NOON, AND 3 TABLETS EVERY EVENING 5/26/19  Yes MER Blood   doxazosin (CARDURA) 4 mg tablet Take 1 tablet (4 mg total) by mouth daily at bedtime 9/13/19  Yes Ward Parker DO   DULoxetine (CYMBALTA) 60 mg delayed release capsule Take 1 capsule (60 mg total) by mouth 2 (two) times a day for 180 days 6/5/19 12/2/19 Yes Freida Bravo MD   ferrous sulfate 325 (65 Fe) mg tablet Take 1 tablet (325 mg total) by mouth daily with breakfast 9/14/19  Yes Paco Cano,    folic acid (FOLVITE) 1 mg tablet TAKE 1 TABLET BY MOUTH DAILY AS DIRECTED 2/22/19  Yes Filomena Ordaz MD   hydrALAZINE (APRESOLINE) 50 mg tablet TAKE 1 TABLET(50 MG TOTAL) BY MOUTH THREE TIMES DAILY FOR 90 DAYS 10/18/19  Yes Joana Navarrete MD   Insulin Glargine (TOUJEO SOLOSTAR) 300 units/mL CONCETRATED U-300 injection pen Inject 1 Units under the skin daily at bedtime 1/10/19  Yes Tyra Alfaro MD   insulin lispro (HUMALOG KWIKPEN) 100 units/mL injection pen INJECT 10 UNDER THE SKIN EVERY DAY OR AS DIRECTED  Patient taking differently: INJECT 10 UNDER THE SKIN EVERY DAY OR AS DIRECTED 5/21/19  Yes Tyra Alfaro MD   Insulin Pen Needle (BD PEN NEEDLE VISHNU U/F) 32G X 4 MM MISC Use 4 times daily 8/23/18  Yes Tyra Alfaro MD   Lancets (ONETOUCH ULTRASOFT) lancets by Does not apply route 4 (four) times a day   3/9/16  Yes Historical Provider, MD   levothyroxine 125 mcg tablet Take 1 tablet (125 mcg total) by mouth daily 6/24/19  Yes Nya Lutz PA-C   LORazepam (ATIVAN) 2 mg tablet Take 1 tablet (2 mg total) by mouth 3 (three) times a day as needed for anxiety 11/13/19 3/12/20 Yes Kavitha Eduardo MD   metoprolol tartrate (LOPRESSOR) 50 mg tablet TAKE 1 TABLET(50 MG TOTAL) BY MOUTH TWICE DAILY 10/4/19  Yes Joana Navarrete MD   pravastatin (PRAVACHOL) 80 mg tablet TAKE 1 TABLET BY MOUTH DAILY 1/29/18  Yes Filomena Ordaz MD   predniSONE 5 mg tablet Take 1 tablet (5 mg total) by mouth daily 5/24/19  Yes MER Acosta   rOPINIRole (REQUIP) 0 25 mg tablet TAKE 1 TABLET BY MOUTH TWICE DAILY 11/16/19  Yes Filomena Ordaz MD   sertraline (ZOLOFT) 100 mg tablet Take 2 tablets (200 mg total) by mouth daily for 180 days 6/5/19 12/2/19 Yes Kavitha Eduardo MD   sevelamer carbonate (RENVELA) 800 mg tablet Take 1 tablet (800 mg total) by mouth 3 (three) times a day with meals 19  Yes Kacey Lennon MD   sodium bicarbonate 650 mg tablet TAKE 2 TABLETS BY MOUTH THREE TIMES DAILY 19  Yes Kacey Lennon MD   tacrolimus (PROGRAF) 1 mg capsule Take 3 mg in AM and 2 mg in PM (19)  Patient taking differently: Take 3 mg by mouth every 12 (twelve) hours  19  Yes Kacey Lnenon MD     I have reviewed home medications with patient personally  Allergies: Allergies   Allergen Reactions    Ixazomib Rash     Ninlaro    Revlimid [Lenalidomide] Rash    Cefadroxil     Latex Rash    Morphine Other (See Comments)     Other reaction(s): Other (See Comments)  projectile vomiting    Morphine And Related GI Intolerance    Myrbetriq [Mirabegron] Hives    Penicillins Hives     Other reaction(s): Other (See Comments)  Respiratory Distress,hives  Tolerates cefazolin       Social History:     Marital Status: Legally    Occupation: N/A  Patient Pre-hospital Living Situation: N/A  Patient Pre-hospital Level of Mobility: N/A  Patient Pre-hospital Diet Restrictions: N/A  Substance Use History:   Social History     Substance and Sexual Activity   Alcohol Use Never    Frequency: Never    Binge frequency: Never    Comment: (history)     Social History     Tobacco Use   Smoking Status Former Smoker    Last attempt to quit: 2012    Years since quittin 5   Smokeless Tobacco Never Used     Social History     Substance and Sexual Activity   Drug Use Never    Types: Hydrocodone    Comment: Past cocaine use many years ago - no current use       Family History:    non-contributory    Physical Exam:     Vitals:   Blood Pressure: 167/75 (19)  Pulse: 64 (19)  Temperature: 97 8 °F (36 6 °C) (19)  Temp Source: Oral (19)  Respirations: 16 (19)  SpO2: 96 % (19)    Physical Exam   Constitutional: She appears well-developed and well-nourished  HENT:   Head: Normocephalic and atraumatic  Cardiovascular: Normal rate, regular rhythm and normal heart sounds  Pulmonary/Chest: Effort normal and breath sounds normal    Abdominal: Soft  She exhibits distension  Neurological: She is alert  Psychiatric: She has a normal mood and affect  Additional Data:     Lab Results: I have personally reviewed pertinent reports  Results from last 7 days   Lab Units 11/23/19  1646   WBC Thousand/uL 6 70   HEMOGLOBIN g/dL 7 1*   HEMATOCRIT % 23 3*   PLATELETS Thousands/uL 149   NEUTROS PCT % 54   LYMPHS PCT % 14   MONOS PCT % 11   EOS PCT % 18*     Results from last 7 days   Lab Units 11/23/19  1646   POTASSIUM mmol/L 4 1   CHLORIDE mmol/L 109*   CO2 mmol/L 18*   BUN mg/dL 44*   CREATININE mg/dL 3 20*   CALCIUM mg/dL 8 3   ALK PHOS U/L 104   ALT U/L 25   AST U/L 16           Imaging: I have personally reviewed pertinent reports  Ct Abdomen Pelvis Wo Contrast    Result Date: 11/6/2019  Narrative: CT ABDOMEN AND PELVIS WITHOUT IV CONTRAST INDICATION:   recurrent UTI in renal transplant patient, r/o intra-abdominal pathology  Status post cholecystectomy; history of pancreatic transplant removal COMPARISON:  2/2/2018 TECHNIQUE:  CT examination of the abdomen and pelvis was performed without intravenous contrast   Axial, sagittal, and coronal 2D reformatted images were created from the source data and submitted for interpretation  Radiation dose length product (DLP) for this visit:  1153 7 mGy-cm   This examination, like all CT scans performed in the [facility], was performed utilizing techniques to minimize radiation dose exposure, including the use of iterative  reconstruction and automated exposure control  Enteric contrast was administered  FINDINGS: ABDOMEN LOWER CHEST:  No clinically significant abnormality identified in the visualized lower chest   Small pericardial effusion or thickening is slightly improved is similar to the prior study  LIVER/BILIARY TREE:  Unremarkable  GALLBLADDER:  The patient is status post cholecystectomy  SPLEEN:  Unremarkable  PANCREAS:  Unremarkable  ADRENAL GLANDS:  Unremarkable  KIDNEYS/URETERS:  Atrophic native kidneys are identified  No hydronephrosis  STOMACH AND BOWEL:  Unremarkable  APPENDIX:  The appendix is not well seen  ABDOMINOPELVIC CAVITY:  No ascites or free intraperitoneal air  Mild retroperitoneal adenopathy appears unchanged with one node measuring up to 1 2 cm in size on image 50  Small pelvic nodes are similar to the prior study  Left renal transplant is seen with probable low density cysts seen laterally which is unchanged  There is no transplant hydronephrosis seen or calculus  There is slight infiltration of the fat surrounding the transplant as compared to some of the prior  examinations  Soft tissue extending in the right lower quadrant leading to some higher density suture material probably represents changes from prior pancreatic transplant  This tissue is similar to prior studies  Surgical sutures appear to abut a bladder diverticulum which probably represents a segment of transplanted duodenum representing anastomosis of the pancreatic transplant to the bladder  VESSELS:  Unremarkable for patient's age  PELVIS REPRODUCTIVE ORGANS:  Unremarkable for patient's age  URINARY BLADDER:  The bladder is distended  There appears to be a bladder diverticulum extending from the fundus to the right with a coarse calcification noted within its lumen  As stated above this most likely is related to previous pancreas transplant surgery with drainage into the bladder  High density could also represent some suture material in addition to suture material seen more anteriorly  ABDOMINAL WALL/INGUINAL REGIONS: Patchy areas of subcutaneous fat infiltration in the anterior abdominal walls probably related to injection sites  OSSEOUS STRUCTURES:  No acute fracture or destructive osseous lesion       Impression: Left lower quadrant renal transplant with slight stranding could be related to patient's UTI according to history  There is no transplant hydronephrosis seen  The native kidneys demonstrate no hydronephrosis  Evidence of prior pancreas transplant  There appears to be fluid-filled structure interposed between the bladder and pancreas transplant consistent with prior transplant procedure with drainage to the bladder rather than small bowel  Small pericardial effusion  Workstation performed: [id number]     Xr Chest 2 Views    Result Date: 11/5/2019  Narrative: CHEST INDICATION:   weakness  COMPARISON:  2/2/2018 EXAM PERFORMED/VIEWS:  XR CHEST PA & LATERAL Images: 2 FINDINGS: Development of mild vascular congestion Cardiomediastinal silhouette appears unremarkable  The lungs are otherwise clear  No pneumothorax or pleural effusion  Osseous structures appear within normal limits for patient age  Impression: Interval development of mild vascular congestion Workstation performed: [id number]     Us Transplant Kidney With Doppler    Result Date: 11/6/2019  Narrative: RENAL ULTRASOUND INDICATION:   UTI symptoms in renal transplant patient  COMPARISON: Renal sonogram 10/1/2019 TECHNIQUE:   Ultrasound of the retroperitoneum was performed with a curvilinear transducer utilizing volumetric sweeps and still imaging techniques  FINDINGS: KIDNEYS: There is severe bilateral renal atrophy with measurements below  Right kidney:  6 4 x 3 4 cm  Cortical thickness 0 8 cm  Left kidney:  Not clearly visualized  Right kidney The renal parenchyma is diffusely echogenic consistent with medical renal disease  No suspicious masses detected  Known small lower pole simple cyst not imaged on this study  No hydronephrosis  No shadowing calculi  No perinephric fluid collections  Left kidney Not visualized  Transplant kidney  13 2 x 6 6 cm  Normal echogenicity and contour  No suspicious masses detected  1 6 cm upper pole simple cyst  No hydronephrosis   No shadowing calculi  No perinephric fluid collection  Upper pole arterial resistive index 0 8 previously 0 6 Interpolar arterial resistive index 0 6 previously 0 7 Lower pole arterial resistive index 0 6 previously 0 8 Patient renal artery and veins  URETERS: Nonvisualized  BLADDER: Normally distended  No focal thickening or mass lesions  Small right posterior bladder diverticulum  Impression: Interval increased renal transplant upper pole arterial index currently 0 8 previously 0 6  Otherwise, normalization of renal transplant interpolar and lower pole arterial resistive indices  No other significant interval change  The examination demonstrates a significant  finding and was documented as such in [facility] for liaison and referring practitioner notification  Workstation performed: [id number]       [facility] / Care Everywhere Records Reviewed: Yes     ** Please Note: This note has been constructed using a voice recognition system   **

## 2019-11-24 NOTE — CONSULTS
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 Pyelo?  - UA reviewed - bacteria, 30-50 WBC, large leuk  Unclear if true infection  - check urine culture on initial urine from yest  - pt already received antibiotics  - check PVR  - check labs in AM including tac level  - IVF with isolyte  - would consult Infectious disease team also  - check amylase, lipase (prior panc drainage to bladder and with abd pain)  - cont bicarb tabs for now  - consider Urology consultation given that pt was unsuccessful with straight cath at home - further training?      2) immune suppression    - tac 3 mg in AM and 2 mg in PM  - pred 5  - goal tac 3  - may need to consider lower pred further also    3) HTN    - restart home regimen  - has widened pulse pressure due to aortic disease  - also has L subclavian stenosis  - also had renal art stenosis of transplanted kidney on imaging but had angio at [place] which was unrevealing    4) acid/base    - cont bicarb tabs  - IVF with isolyte    5) electrolytes    - relatively stable    6) failed panc transplant    - insulin management per Primary team    7) anemia - repeat CBC in AM      8) Myeloma    - follows closely with Hematology  - has had mult drug reactions  - on revlimid now      HISTORY OF PRESENT ILLNESS:  Requesting Physician: Malika Hill MD  Reason for Consult: EDUARDO, renal transplant    Lucas Pedro is a 53 y o  female with PMHx of kidney/pancreas transplant in 1998, now failed pancreas in 2017, CKD IV, DM I, HTN, recurrent pyelonephritis, required dialysis temporarily in 2014, 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, dCHF, aortic regurg, zoster, subclavian art stenosis on L who was admitted to [facility] after presenting with inability to perform straight cath and fevers and abd pain  A renal consultation is requested today for assistance in the management of EDUARDO, renal transplant  Pt states she was in usual state of abran and had hinojosa removed on 11/22 and was transitioned to straight cath and had successful cath one time  Then when pt had second attempt, was unsuccessful  Then states that also has had fevers, confusion (per mother at bedside and pt), and abd discomfort and was asked by VNA to come to the hospital      11/2019 - admission for dysuria  Found to have concern for pyelo  Completed antibiotics  Had hinojosa placed       Admission in oct 2019 with EDUARDO, acidosis, pyelonephritis        PAST MEDICAL HISTORY:  Past Medical History:   Diagnosis Date    Abnormal liver function test     Acute kidney injury ([address] )     Acute on chronic congestive heart failure (HCC)     Allergic urticaria     Anemia     Cancer (HCC)     Multiple myeloma    Cervical dysplasia     Cholelithiasis     Chronic diastolic (congestive) heart failure ([address] ) 9/18/2017    Diabetes mellitus ([address] )     Previous, controlled with diet    Diabetes mellitus with foot ulcer ([address] )     Disease of thyroid gland     Encephalopathy     Hematuria     + leak est- secondary to UTIs/panc drainage    History of transfusion     Hyperkalemia     Hypertension     Iliotibial band syndrome     Lumbar radiculopathy     Multiple myeloma (HCC)     Multiple myeloma (HCC)     Night blindness     Nonrheumatic aortic (valve) insufficiency     Pneumonia     Renal disorder     Retinopathy     Seborrhea     [facility]     Shingles     Sinus tachycardia     B blocker - cardio echo stress test 02 normal/neg LE doppler 2/02 OK and 12/07    Status post simultaneous kidney and pancreas transplant ([facility] )     Toe amputation status     Trochanteric bursitis        PAST SURGICAL HISTORY:  Past Surgical History:   Procedure Laterality Date    CATARACT EXTRACTION      CHOLECYSTECTOMY      COLONOSCOPY      two polyps in the rectum removed and biopsied diverticulosis in the sigmoid colon, external hemorrhoiods- Dr Barfield    COMBINED KIDNEY-PANCREAS TRANSPLANT N/A     CT BONE MARROW BIOPSY AND ASPIRATION  4/17/2019    CYSTOSCOPY N/A 10/13/2016    Procedure: CYSTOSCOPY, retrograde pyelogram, biopsy of ureteral polyp; Surgeon: Cheli Truner MD;  [facility];  Service:    [facility] DILATION AND CURETTAGE OF UTERUS      ESOPHAGOGASTRODUODENOSCOPY N/A 11/20/2017    Procedure: ESOPHAGOGASTRODUODENOSCOPY (EGD); Surgeon: Cally Waldron DO;  Location: [facility]; Service: Gastroenterology    EYE SURGERY      cataracts    FOOT AMPUTATION THROUGH METATARSAL Left     FOOT SURGERY Right     excision of metatarsal heads    HALLUX VALGUS CORRECTION Right     NEPHRECTOMY TRANSPLANTED ORGAN      PANCREATIC TRANSPLANT REMOVAL  1998       ALLERGIES:  Allergies   Allergen Reactions    Ixazomib Rash     Ninlaro    Revlimid [Lenalidomide] Rash    Cefadroxil     Latex Rash    Morphine Other (See Comments)     Other reaction(s): Other (See Comments)  projectile vomiting    Morphine And Related GI Intolerance    Myrbetriq [Mirabegron] Hives    Penicillins Hives     Other reaction(s):  Other (See Comments)  Respiratory Distress,hives  Tolerates cefazolin       SOCIAL HISTORY:  Social History     Substance and Sexual Activity   Alcohol Use Never    Frequency: Never    Binge frequency: Never    Comment: (history)     Social History     Substance and Sexual Activity   Drug Use Never    Types: Hydrocodone    Comment: Past cocaine use many years ago - no current use     Social History     Tobacco Use   Smoking Status Former Smoker    Last attempt to quit: 2012    Years since quittin 5   Smokeless Tobacco Never Used       FAMILY HISTORY:  Family History   Problem Relation Age of Onset    Hypertension Mother     Cancer Mother     Hypertension Father     Cancer Father     Cancer Maternal Grandfather     Cancer Paternal Grandmother     Cancer Paternal Grandfather     Depression Sister     Breast cancer Maternal Grandmother 77       MEDICATIONS:    Current Facility-Administered Medications:     amLODIPine (NORVASC) tablet 10 mg, 10 mg, Oral, QAM, Wei Mazariegos MD, 10 mg at 19    ARIPiprazole (ABILIFY) tablet 20 mg, 20 mg, Oral, HS, Wei Mazariegos MD, 20 mg at 19    aspirin (ECOTRIN LOW STRENGTH) EC tablet 81 mg, 81 mg, Oral, Daily, Wei Mazariegos MD, 81 mg at 1904    busPIRone (BUSPAR) tablet 5 mg, 5 mg, Oral, BID, Wei Mazariegos MD, 5 mg at 1905    cholecalciferol (VITAMIN D3) tablet 1,000 Units, 1,000 Units, Oral, TID, Wei Mazariegos MD, 1,000 Units at 19 0904    cloNIDine (CATAPRES) tablet 0 3 mg, 0 3 mg, Oral, Q8H [facility], Wei Mazariegos MD, 0 3 mg at 19 0532    doxazosin (CARDURA) tablet 4 mg, 4 mg, Oral, HS, Wei Mazariegos MD, 4 mg at 19    DULoxetine (CYMBALTA) delayed release capsule 60 mg, 60 mg, Oral, BID, Wei Mazariegos MD, 60 mg at 19 4299    ferrous sulfate tablet 325 mg, 325 mg, Oral, Daily With Breakfast, Wei Mazariegos MD, 325 mg at //[phone]    folic acid (FOLVITE) tablet 1,000 mcg, 1,000 mcg, Oral, Daily, Wei Mazariegos MD, 1,000 mcg at 19 0904    heparin (porcine) subcutaneous injection 5,000 Units, 5,000 Units, Subcutaneous, Q8H [facility], 5,000 Units at 11/24/19 0534 **AND** Platelet count, , , Once, Betha Goodell, MD    hydrALAZINE (APRESOLINE) tablet 50 mg, 50 mg, Oral, TID, Betha Goodell, MD, 50 mg at 11/24/19 0904    insulin glargine (LANTUS) subcutaneous injection 5 Units 0 05 mL, 5 Units, Subcutaneous, HS, Betha Goodell, MD, 5 Units at 11/23/19 2242    insulin lispro (HumaLOG) 100 units/mL subcutaneous injection 1-6 Units, 1-6 Units, Subcutaneous, TID AC **AND** Fingerstick Glucose (POCT), , , TID AC, Javy Parsons PA-C    insulin lispro (HumaLOG) 100 units/mL subcutaneous injection 1-6 Units, 1-6 Units, Subcutaneous, HS, Javy Parsons PA-C, Stopped at 11/23/19 2147    levothyroxine tablet 125 mcg, 125 mcg, Oral, Early Morning, Betha Goodell, MD, 125 mcg at 11/24/19 0534    LORazepam (ATIVAN) tablet 2 mg, 2 mg, Oral, TID PRN, Betha Goodell, MD    metoprolol tartrate (LOPRESSOR) tablet 50 mg, 50 mg, Oral, Q12H [facility], Javy Parsons PA-C, 50 mg at 11/24/19 0904    ondansetron (ZOFRAN) injection 4 mg, 4 mg, Intravenous, Q8H PRN, Khushi Kerns PA-C, 4 mg at 11/24/19 0534    pravastatin (PRAVACHOL) tablet 80 mg, 80 mg, Oral, Daily, Betha Goodell, MD, 80 mg at 11/24/19 2267    predniSONE tablet 5 mg, 5 mg, Oral, Daily, Betha Goodell, MD, 5 mg at 11/24/19 1467    sertraline (ZOLOFT) tablet 200 mg, 200 mg, Oral, Daily, Betha Goodell, MD, 200 mg at 11/24/19 7251    sevelamer (RENAGEL) tablet 800 mg, 800 mg, Oral, TID With Meals, Betha Goodell, MD, 800 mg at 11/24/19 2183    sodium bicarbonate tablet 1,300 mg, 1,300 mg, Oral, TID, Betha Goodell, MD, 1,300 mg at 11/24/19 0904    tacrolimus (PROGRAF) capsule 3 mg, 3 mg, Oral, Q12H [facility], Betha Goodell, MD, 3 mg at 11/24/19 9787    REVIEW OF SYSTEMS:    All the systems were reviewed and were negative except as documented on the HPI      PHYSICAL EXAM:  Current Weight:    First Weight:    Vitals:    11/23/19 2201 11/24/19 0119 11/24/19 0532 11/24/19 0642   BP: (!) 179/74 (!) 173/76 (!) 178/98 166/77   BP Location:  Left arm  Left arm   Pulse:  62  63   Resp:  16  18   Temp:  97 5 °F (36 4 °C)  98 3 °F (36 8 °C)   TempSrc:  Oral  Oral   SpO2:  93%  90%     No intake or output data in the 24 hours ending 11/24/19 1006  Physical Exam  General: NAD  Skin: no rash  Eyes: anicteric sclera  ENT: moist mucous membrane  Neck: supple  Chest: CTA b/l, no ronchii, no wheeze, no rubs, no rales  CVS: s1s2, soft murmur, no gallop, no rub  Abdomen: soft, nontender, nl sounds  Extremities: trace edema LE b/l  : no hinojosa  Neuro: AAOX2-3, slightly confused  Psych: normal affect      Invasive Devices:      Lab Results:   Results from last 7 days   Lab Units 11/24/19  0511 11/23/19  1646   WBC Thousand/uL 6 48 6 70   HEMOGLOBIN g/dL 7 1* 7 1*   HEMATOCRIT % 23 8* 23 3*   PLATELETS Thousands/uL 147* 149   POTASSIUM mmol/L 4 3 4 1   CHLORIDE mmol/L 111* 109*   CO2 mmol/L 17* 18*   BUN mg/dL 42* 44*   CREATININE mg/dL 3 05* 3 20*   CALCIUM mg/dL 7 9* 8 3   ALK PHOS U/L 104 104   ALT U/L 17 25   AST U/L 14 16

## 2019-11-24 NOTE — ASSESSMENT & PLAN NOTE
Lab Results   Component Value Date    HGBA1C 5 1 09/09/2019       Recent Labs     11/23/19  1937 11/23/19  2131 11/24/19  0644 11/24/19  1127   POCGLU 88 111 91 131       Blood Sugar Average: Last 72 hrs:  (P) 105 25   Lantus 5 units

## 2019-11-24 NOTE — ASSESSMENT & PLAN NOTE
Patient with past medical history of multiple myeloma not having achieved remission      Plan:  · Continue chemotherapy regimen

## 2019-11-24 NOTE — ASSESSMENT & PLAN NOTE
Patient with past medical history of recurrent UTIs  Admitted 11/05 to [facility] for similar complaints  At the time patient was placed in a Moreno catheter  It was a term by Urology but given the patient's recurrent UTIs and distended bladder she would benefit from keeping the Moreno with follow up as outpatient for voiding trial and self-catheterization teaching  Patient's catheter was removed 1 day prior to admission [facility]  Today patient noticed hazy color of urine and foul smell      Plan:  · Start ceftriaxone IV  · Wait for urine culture sensitivities to return

## 2019-11-24 NOTE — PLAN OF CARE
Problem: Potential for Falls  Goal: Patient will remain free of falls  Description  INTERVENTIONS:  - Assess patient frequently for physical needs  -  Identify cognitive and physical deficits and behaviors that affect risk of falls    -  [place] fall precautions as indicated by assessment   - Educate patient/family on patient safety including physical limitations  - Instruct patient to call for assistance with activity based on assessment  - Modify environment to reduce risk of injury  - Consider OT/PT consult to assist with strengthening/mobility  Outcome: Progressing     Problem: GENITOURINARY - ADULT  Goal: Absence of urinary retention  Description  INTERVENTIONS:  - Assess patients ability to void and empty bladder  - Monitor I/O  - Bladder scan as needed  - Discuss with physician/AP medications to alleviate retention as needed  - Discuss catheterization for long term situations as appropriate  Outcome: Progressing

## 2019-11-24 NOTE — ASSESSMENT & PLAN NOTE
BP is 166/77  On high side  Will add PRN  Also could consider increasing hydralazine to 75 mg TID   Will d/w nephrology

## 2019-11-24 NOTE — CONSULTS
Consultation - Infectious Disease   Marco Cardozo 53 y o  female MRN: [id number]  Unit/Bed#: S -01 Encounter: [id number]      IMPRESSION & RECOMMENDATIONS:   Impression/Recommendations:  1  Recurrent UTI  In the setting of # 2/3  Consider ongoing anatomic problem  Consider antibiotic failure as prior urine culture negative and antibiotic treatment was empiric  Reported high fever and confusion at home  No sepsis criteria here  Difficult to ascertain true infection versus colonization in this patient with complicated urological history, but some of features of presentation do suggest active infection  History of relatively susceptible E coli in the past   Recent treatment exposure to 3rd generation cephalosporin  Rec:  · Change antibiotics to cefepime  · Follow up urine culture  · Follow temperatures closely  · Recheck CBC in AM  · Supportive care as per the primary service    2  Recent urinary retention  Status post recent Moreno, removed on Friday  Bladder scan in the ED normal   Rec:  · Initiate urinary retention protocol    3  History of renal/pancreatic transplant   1998  Question of pancreas draining into the bladder? On chronic immunosuppression  Rec:  · Continue home immunosuppression regimen    4  EDUARDO on CKD  Likely multifactorial   Baseline around 2 5  Rec:  · Follow creatinine closely and dose-adjust antibiotics as indicated  · Recheck BMP in AM  · Renal follow-up ongoing    5  MM  Recently started on Revlimid  The above impression and plan was discussed in detail with the [address] [place] [address] service  Antibiotics:  Ceftriaxone # 2    Thank you for this consultation  We will follow along with you  HISTORY OF PRESENT ILLNESS:  Reason for Consult:  UTI    HPI: Marco Cardozo is a 53 y o  female with remote history of renal/pancreas transplant in [address] [place] [address] Avenue on chronic immunosuppression  Of note there is a thought that her pancreas drains into her bladder     She also has a history of multiple myeloma and was recently re-initiated on Revlimid by Hematology Oncology  She carries a diagnosis of frequent UTIs  She states this is been going on for 4-5 years  She has been followed by Dr Cisco Ann of our practice in the past   He was hesitant to place the patient on suppressive antibiotics due to the fear of promoting resistance  She was recently admitted to [facility] earlier in November for weakness, fatigue, and dysuria  Her urine cultures at that time were negative although she had started taking Keflex at home  She was ultimately treated with cefpodoxime to complete 14 days total of antibiotics through 11/14  She was found to have urinary retention and was seen by Urology  This was thought to be partly due to severe constipation and she was placed on a bladder regimen  She had a Moreno catheter placed and was seen by Urology on 11/22 and had catheter removal   She was instructed at that time on clean intermittent catheterization for home with instructions to perform this 3 times daily  She states she was able to do this that evening but then the following morning she had difficulty inserting the catheter  She also noticed malodorous urine as well as being confused which has occurred with previous UTIs  She called her VNA and upon arrival was found to have fever and hypertension  She was referred to the emergency department  Upon presentation she was noted to be afebrile with a normal heart rate although did have high blood pressures  Her labs revealed a normal WBC  She had pyuria on her urinalysis  She had a KI on CKD  She was given a dose of ceftriaxone  We are asked to comment on further evaluation and management  Since admission she has remained afebrile  She did have a bladder scan on admission with 85 cc  She states she was not catheterized in the ED  In performing this consult, I have reviewed prior admission and outpatient visit records in detail      REVIEW OF SYSTEMS:  Patient complains of periumbilical pain  She states she has sensation when she needs to void but has to push to get the urine out  She feels she is able to completely empty her bladder  A complete system-based review of systems is otherwise negative  PAST MEDICAL HISTORY:  Past Medical History:   Diagnosis Date    Abnormal liver function test     [facility]     Acute on chronic congestive heart failure (HCC)     Allergic urticaria     Anemia     Cancer (HCC)     Multiple myeloma    Cervical dysplasia     Cholelithiasis     Chronic diastolic (congestive) heart failure [facility] ) 9/18/2017    Diabetes mellitus ([facility] )     Previous, controlled with diet    Diabetes mellitus with foot ulcer ([facility]     Disease of thyroid gland     Encephalopathy     Hematuria     + leak est- secondary to UTIs/panc drainage    History of transfusion     Hyperkalemia     Hypertension     Iliotibial band syndrome     Lumbar radiculopathy     Multiple myeloma (HCC)     Multiple myeloma ([facility]     Night blindness     Nonrheumatic aortic (valve) insufficiency     Pneumonia     Renal disorder     Retinopathy     Seborrhea     [facility]     Shingles     Sinus tachycardia     B blocker - cardio echo stress test 02 normal/neg LE doppler 2/02 OK and 12/07    Status post simultaneous kidney and pancreas transplant ([facility]     Toe amputation status     Trochanteric bursitis      Past Surgical History:   Procedure Laterality Date    CATARACT EXTRACTION      CHOLECYSTECTOMY      COLONOSCOPY      two polyps in the rectum removed and biopsied diverticulosis in the sigmoid colon, external hemorrhoiods- Dr Barfield    COMBINED KIDNEY-PANCREAS TRANSPLANT N/A     CT BONE MARROW BIOPSY AND ASPIRATION  4/17/2019    CYSTOSCOPY N/A 10/13/2016    Procedure: CYSTOSCOPY, retrograde pyelogram, biopsy of ureteral polyp;   Surgeon: Nilda Ramon MD;  [facility];  Service:    [id number] [place] [address] OF UTERUS      ESOPHAGOGASTRODUODENOSCOPY N/A 2017    Procedure: ESOPHAGOGASTRODUODENOSCOPY (EGD); Surgeon: Dionte Paredes DO;  Location: [facility]; Service: Gastroenterology    EYE SURGERY      cataracts    FOOT AMPUTATION THROUGH METATARSAL Left     FOOT SURGERY Right     excision of metatarsal heads    HALLUX VALGUS CORRECTION Right     NEPHRECTOMY TRANSPLANTED ORGAN      PANCREATIC TRANSPLANT REMOVAL         FAMILY HISTORY:  Non-contributory    SOCIAL HISTORY:  Social History     Substance and Sexual Activity   Alcohol Use Never    Frequency: Never    Binge frequency: Never    Comment: (history)     Social History     Substance and Sexual Activity   Drug Use Never    Types: Hydrocodone    Comment: Past cocaine use many years ago - no current use     Social History     Tobacco Use   Smoking Status Former Smoker    Last attempt to quit: 2012    Years since quittin 5   Smokeless Tobacco Never Used       ALLERGIES:  Allergies   Allergen Reactions    Ixazomib Rash     Ninlaro    Revlimid [Lenalidomide] Rash    Cefadroxil     Latex Rash    Morphine Other (See Comments)     Other reaction(s): Other (See Comments)  projectile vomiting    Morphine And Related GI Intolerance    Myrbetriq [Mirabegron] Hives    Penicillins Hives     Other reaction(s): Other (See Comments)  Respiratory Distress,hives  Tolerates cefazolin       MEDICATIONS:  All current active medications have been reviewed      PHYSICAL EXAM:  Vitals:  Temp:  [97 5 °F (36 4 °C)-98 8 °F (37 1 °C)] 98 3 °F (36 8 °C)  HR:  [62-70] 63  Resp:  [16-19] 18  BP: (159-202)/(67-98) 166/77  SpO2:  [90 %-97 %] 90 %  Temp (24hrs), Av 1 °F (36 7 °C), Min:97 5 °F (36 4 °C), Max:98 8 °F (37 1 °C)  Current: Temperature: 98 3 °F (36 8 °C)     Physical Exam:  General:  Well-nourished, well-developed, in no acute distress  Eyes:  Conjunctive clear with no hemorrhages or effusions  Oropharynx:  No ulcers, no lesions  Neck:  Supple, no lymphadenopathy  Lungs:  Clear to auscultation bilaterally, no accessory muscle use  Cardiac:  Regular rate and rhythm, no murmurs  Abdomen:  Soft, non-tender, obese, mild tenderness periumbilical area  No suprapubic tenderness or tenderness over her transplanted kidney  Extremities:  No peripheral cyanosis, clubbing, or edema  Skin:  No rashes, no ulcers  Neurological:  Moves all four extremities spontaneously, sensation grossly intact    LABS, IMAGING, & OTHER STUDIES:  Lab Results:  I have personally reviewed pertinent labs  Results from last 7 days   Lab Units 11/24/19  0511 11/23/19  1646   POTASSIUM mmol/L 4 3 4 1   CHLORIDE mmol/L 111* 109*   CO2 mmol/L 17* 18*   BUN mg/dL 42* 44*   CREATININE mg/dL 3 05* 3 20*   EGFR ml/min/1 73sq m 17 16   CALCIUM mg/dL 7 9* 8 3   AST U/L 14 16   ALT U/L 17 25   ALK PHOS U/L 104 104     Results from last 7 days   Lab Units 11/24/19  0511 11/23/19  1646   WBC Thousand/uL 6 48 6 70   HEMOGLOBIN g/dL 7 1* 7 1*   PLATELETS Thousands/uL 147* 149           Imaging Studies:   I have personally reviewed pertinent imaging study reports and images in PACS  EKG, Pathology, and Other Studies:   I have personally reviewed pertinent reports

## 2019-11-24 NOTE — ASSESSMENT & PLAN NOTE
Patient with past medical history of renal transplant in 1998  Patient's baseline creatinine is 2 4-2 8  On admission patient's creatinine is at 3 2      Plan:  · Continue 0 9% normal saline  · Monitor creatinine levels

## 2019-11-24 NOTE — PROGRESS NOTES
Progress Note - Clare Obando 1965, 53 y o  female MRN: [id number]    Unit/Bed#: S -01 Encounter: [id number]    Primary Care Provider: Earline Willis MD   Date and time admitted to hospital: 11/23/2019  2:47 PM        Multiple myeloma not having achieved remission [facility])  Assessment & Plan  Patient with past medical history of multiple myeloma not having achieved remission  C/w with treatment  Essential hypertension  Assessment & Plan  BP is 166/77  On high side  Will add PRN  Also could consider increasing hydralazine to 75 mg TID   Will d/w nephrology  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 [facility])  Assessment & Plan  Lab Results   Component Value Date    HGBA1C 5 1 09/09/2019       Recent Labs     11/23/19  1937 11/23/19  2131 11/24/19  0644 11/24/19  1127   POCGLU 88 111 91 131       Blood Sugar Average: Last 72 hrs:  (P) 105 25   Lantus 5 units  Chronic kidney disease, stage 4 (severe) ([facility] [address] )  Assessment & Plan  Patient with past medical history of renal transplant in 1998  Patient's baseline creatinine is 2 4-2 8  On admission patient's creatinine is at 3 2  IVF  Monitor creatinine levels  Nephrology consulted given transplant   Renal transplant recipient  Assessment & Plan  Renal and pancreatic transplant recipient in 1998  Continue tacrolimus and prednisone  Monitor creatinine    * Recurrent UTI  Assessment & Plan  CT scan showing stranding  Systemically not unwell  ID are following and cefepime is continued  C/w immunosuppressives for renal transplant in 1998   VTE Pharmacologic Prophylaxis:   Pharmacologic: Heparin  Mechanical VTE Prophylaxis in Place: Yes    Patient Centered Rounds: I have performed bedside rounds with nursing staff today  Discussions with Specialists or Other Care Team Provider: discussed with Nephrology , ID input noted  Also discussed with endocrinology and cancelled consult  Education and Discussions with Family / Patient: discussed with patient  Time Spent for Care: 30 minutes  More than 50% of total time spent on counseling and coordination of care as described above  Current Length of Stay: 1 day(s)    Current Patient Status: Inpatient   Certification Statement: The patient will continue to require additional inpatient hospital stay due to IV ABX and IVF  Discharge Plan: Not medically stable   Code Status: Level 1 - Full Code      Subjective:   Patient seen and examined  Feeling "okay"    Objective:     Vitals:   Temp (24hrs), Av 1 °F (36 7 °C), Min:97 5 °F (36 4 °C), Max:98 8 °F (37 1 °C)    Temp:  [97 5 °F (36 4 °C)-98 8 °F (37 1 °C)] 98 3 °F (36 8 °C)  HR:  [62-70] 63  Resp:  [16-19] 18  BP: (159-202)/(67-98) 166/77  SpO2:  [90 %-97 %] 90 %  There is no height or weight on file to calculate BMI  Input and Output Summary (last 24 hours):     No intake or output data in the 24 hours ending 19 1326    Physical Exam:     Physical Exam   Constitutional: She is oriented to person, place, and time  She appears well-developed  No distress  HENT:   Head: Normocephalic  Mouth/Throat: No oropharyngeal exudate  Eyes: Pupils are equal, round, and reactive to light  Right eye exhibits no discharge  Left eye exhibits no discharge  No scleral icterus  Neck: Normal range of motion  No JVD present  No tracheal deviation present  No thyromegaly present  Cardiovascular: Normal rate  Exam reveals no gallop and no friction rub  No murmur heard  Pulmonary/Chest: Effort normal  No stridor  No respiratory distress  She has no wheezes  She has no rales  She exhibits no tenderness  Abdominal: Soft  She exhibits no distension and no mass  There is tenderness  There is no rebound and no guarding  No hernia  Musculoskeletal: She exhibits no edema, tenderness or deformity  Lymphadenopathy:     She has no cervical adenopathy     Neurological: She is alert and oriented to person, place, and time  She displays normal reflexes  No cranial nerve deficit or sensory deficit  She exhibits normal muscle tone  Coordination normal    Skin: Capillary refill takes less than 2 seconds  No rash noted  She is not diaphoretic  No erythema  There is pallor  Psychiatric: She has a normal mood and affect  Additional Data:     Labs:    Results from last 7 days   Lab Units 11/24/19  0511 11/23/19  1646   WBC Thousand/uL 6 48 6 70   HEMOGLOBIN g/dL 7 1* 7 1*   HEMATOCRIT % 23 8* 23 3*   PLATELETS Thousands/uL 147* 149   NEUTROS PCT %  --  54   LYMPHS PCT %  --  14   MONOS PCT %  --  11   EOS PCT %  --  18*     Results from last 7 days   Lab Units 11/24/19  0511   SODIUM mmol/L 143   POTASSIUM mmol/L 4 3   CHLORIDE mmol/L 111*   CO2 mmol/L 17*   BUN mg/dL 42*   CREATININE mg/dL 3 05*   ANION GAP mmol/L 15*   CALCIUM mg/dL 7 9*   ALBUMIN g/dL 2 9*   TOTAL BILIRUBIN mg/dL 0 50   ALK PHOS U/L 104   ALT U/L 17   AST U/L 14   GLUCOSE RANDOM mg/dL 86         Results from last 7 days   Lab Units 11/24/19  1127 11/24/19  0644 11/23/19  2131 11/23/19  1937   POC GLUCOSE mg/dl 131 91 111 88                   * I Have Reviewed All Lab Data Listed Above  * Additional Pertinent Lab Tests Reviewed: [address] Admission Reviewed    Imaging:    Imaging Reports Reviewed Today Include:   CT -   "  Left lower quadrant renal transplant with slight stranding could be related to patient's UTI according to history  Rohan Duongy is no transplant hydronephrosis seen  The native kidneys demonstrate no hydronephrosis  Evidence of prior pancreas transplant   There appears to be fluid-filled structure interposed between the bladder and pancreas transplant consistent with prior transplant procedure with drainage to the bladder rather than small bowel  Small pericardial effusion "  Imaging Personally Reviewed by Myself Includes:  As above       Recent Cultures (last 7 days):           Last 24 Hours Medication List:     Current Facility-Administered Medications:  [START ON 11/25/2019] amLODIPine 10 mg Oral QAM Kacey Lennon MD    ARIPiprazole 20 mg Oral HS William Rashid MD    aspirin 81 mg Oral Daily William Rashid MD    busPIRone 5 mg Oral BID William Rashid MD    cefepime 1,000 mg Intravenous Q24H Rosey Roman MD Last Rate: 1,000 mg (11/24/19 1304)   cholecalciferol 1,000 Units Oral TID William Rashid MD    cloNIDine 0 3 mg Oral Q8H [address] William Rashid MD    doxazosin 4 mg Oral HS William Rashid MD    DULoxetine 60 mg Oral BID William Rashid MD    ferrous sulfate 325 mg Oral Daily With Breakfast William Rashid MD    folic acid 7,881 mcg Oral Daily William Rashid MD    heparin (porcine) 5,000 Units Subcutaneous [facility] William Rashid MD    hydrALAZINE 50 mg Oral TID William Rashid MD    insulin glargine 5 Units Subcutaneous HS William Rashid MD    insulin lispro 1-6 Units Subcutaneous TID AC Javy Parsons PA-C    insulin lispro 1-6 Units Subcutaneous HS Javy Armando PA-C    levothyroxine 125 mcg Oral Early Morning William Rashid MD    LORazepam 2 mg Oral TID PRN William Rashid MD    metoprolol tartrate 50 mg Oral Q12H [address] Javy Armando PA-C    multi-electrolyte 50 mL/hr Intravenous Continuous Kacey Lennon MD Last Rate: 50 mL/hr (11/24/19 1249)   ondansetron 4 mg Intravenous Q8H PRN Alicia Velasco PA-C    pravastatin 80 mg Oral Daily William Rashid MD    predniSONE 5 mg Oral Daily William Rashid MD    sertraline 200 mg Oral Daily William Rashid MD    sevelamer 800 mg Oral TID With Meals William Rashid MD    sodium bicarbonate 1,300 mg Oral TID William Rashid MD    tacrolimus 2 mg Oral HS Kacey Lennon MD    Alexa Bi ON 11/25/2019] tacrolimus 3 mg Oral Daily Kacey Lennon MD         Today, Patient Was Seen By: Miky Buck MD    ** Please Note: Dictation voice to text software may have been used in the creation of this document   **

## 2019-11-24 NOTE — ASSESSMENT & PLAN NOTE
Monitor daily BP and ontinue home medications:  · Amlodipine 10 mg as directed  · Clonidine 0 3 mg as directed  · Doxazosin 4 mg as directed  · Hydralazine 50 mg as directed

## 2019-11-24 NOTE — ASSESSMENT & PLAN NOTE
CT scan showing stranding  Systemically not unwell  ID are following and cefepime is continued  C/w immunosuppressives for renal transplant in 1998

## 2019-11-24 NOTE — ASSESSMENT & PLAN NOTE
Patient with past medical history of multiple myeloma not having achieved remission  C/w with treatment

## 2019-11-24 NOTE — ASSESSMENT & PLAN NOTE
Patient with past medical history of renal transplant in 1998  Patient's baseline creatinine is 2 4-2 8  On admission patient's creatinine is at 3 2  IVF  Monitor creatinine levels  Nephrology consulted given transplant

## 2019-11-24 NOTE — ASSESSMENT & PLAN NOTE
Renal and pancreatic transplant recipient in 1998      Plan:  · Continue tacrolimus and prednisone  · Monitor creatinine

## 2019-11-24 NOTE — ASSESSMENT & PLAN NOTE
Lab Results   Component Value Date    HGBA1C 5 1 09/09/2019       Recent Labs     11/23/19  1937   POCGLU 88       Blood Sugar Average: Last 72 hrs:  (P) 88   · Start Lantus 5 units HS  · Start insulin sliding scale  · Appreciate Endocrinology recommendations

## 2019-11-25 LAB
ABO GROUP BLD: NORMAL
ALBUMIN SERPL BCP-MCNC: 2.7 G/DL (ref 3.5–5)
ALP SERPL-CCNC: 93 U/L (ref 46–116)
ALT SERPL W P-5'-P-CCNC: 9 U/L (ref 12–78)
AMYLASE SERPL-CCNC: 15 IU/L (ref 25–115)
ANION GAP SERPL CALCULATED.3IONS-SCNC: 14 MMOL/L (ref 4–13)
AST SERPL W P-5'-P-CCNC: 12 U/L (ref 5–45)
BILIRUB SERPL-MCNC: 0.35 MG/DL (ref 0.2–1)
BLD GP AB SCN SERPL QL: POSITIVE
BUN SERPL-MCNC: 45 MG/DL (ref 5–25)
CALCIUM SERPL-MCNC: 7.7 MG/DL (ref 8.3–10.1)
CHLORIDE SERPL-SCNC: 110 MMOL/L (ref 100–108)
CO2 SERPL-SCNC: 18 MMOL/L (ref 21–32)
CREAT SERPL-MCNC: 3.15 MG/DL (ref 0.6–1.3)
ERYTHROCYTE [DISTWIDTH] IN BLOOD BY AUTOMATED COUNT: 17.7 % (ref 11.6–15.1)
GFR SERPL CREATININE-BSD FRML MDRD: 16 ML/MIN/1.73SQ M
GLUCOSE SERPL-MCNC: 101 MG/DL (ref 65–140)
GLUCOSE SERPL-MCNC: 108 MG/DL (ref 65–140)
GLUCOSE SERPL-MCNC: 112 MG/DL (ref 65–140)
GLUCOSE SERPL-MCNC: 130 MG/DL (ref 65–140)
GLUCOSE SERPL-MCNC: 145 MG/DL (ref 65–140)
HCT VFR BLD AUTO: 22.1 % (ref 34.8–46.1)
HGB BLD-MCNC: 6.6 G/DL (ref 11.5–15.4)
LIPASE SERPL-CCNC: 81 U/L (ref 73–393)
MCH RBC QN AUTO: 31.6 PG (ref 26.8–34.3)
MCHC RBC AUTO-ENTMCNC: 29.9 G/DL (ref 31.4–37.4)
MCV RBC AUTO: 106 FL (ref 82–98)
PLATELET # BLD AUTO: 145 THOUSANDS/UL (ref 149–390)
PMV BLD AUTO: 13.1 FL (ref 8.9–12.7)
POTASSIUM SERPL-SCNC: 4.4 MMOL/L (ref 3.5–5.3)
PROT SERPL-MCNC: 6.7 G/DL (ref 6.4–8.2)
RBC # BLD AUTO: 2.09 MILLION/UL (ref 3.81–5.12)
RH BLD: POSITIVE
SODIUM SERPL-SCNC: 142 MMOL/L (ref 136–145)
SPECIMEN EXPIRATION DATE: NORMAL
WBC # BLD AUTO: 5.39 THOUSAND/UL (ref 4.31–10.16)

## 2019-11-25 PROCEDURE — 86978 RBC PRETREATMENT SERUM: CPT

## 2019-11-25 PROCEDURE — 86921 COMPATIBILITY TEST INCUBATE: CPT

## 2019-11-25 PROCEDURE — 86807 CYTOTOXIC ANTIBODY SCREENING: CPT

## 2019-11-25 PROCEDURE — 86906 BLD TYPING SEROLOGIC RH PHNT: CPT

## 2019-11-25 PROCEDURE — 86900 BLOOD TYPING SEROLOGIC ABO: CPT | Performed by: INTERNAL MEDICINE

## 2019-11-25 PROCEDURE — 86901 BLOOD TYPING SEROLOGIC RH(D): CPT

## 2019-11-25 PROCEDURE — 86901 BLOOD TYPING SEROLOGIC RH(D): CPT | Performed by: INTERNAL MEDICINE

## 2019-11-25 PROCEDURE — 86970 RBC PRETX INCUBATJ W/CHEMICL: CPT

## 2019-11-25 PROCEDURE — 86880 COOMBS TEST DIRECT: CPT | Performed by: INTERNAL MEDICINE

## 2019-11-25 PROCEDURE — 83690 ASSAY OF LIPASE: CPT | Performed by: INTERNAL MEDICINE

## 2019-11-25 PROCEDURE — 86905 BLOOD TYPING RBC ANTIGENS: CPT

## 2019-11-25 PROCEDURE — 82948 REAGENT STRIP/BLOOD GLUCOSE: CPT

## 2019-11-25 PROCEDURE — 86922 COMPATIBILITY TEST ANTIGLOB: CPT

## 2019-11-25 PROCEDURE — 85027 COMPLETE CBC AUTOMATED: CPT | Performed by: INTERNAL MEDICINE

## 2019-11-25 PROCEDURE — 80053 COMPREHEN METABOLIC PANEL: CPT | Performed by: INTERNAL MEDICINE

## 2019-11-25 PROCEDURE — 99232 SBSQ HOSP IP/OBS MODERATE 35: CPT | Performed by: INTERNAL MEDICINE

## 2019-11-25 PROCEDURE — 30233N1 TRANSFUSION OF NONAUTOLOGOUS RED BLOOD CELLS INTO PERIPHERAL VEIN, PERCUTANEOUS APPROACH: ICD-10-PCS | Performed by: INTERNAL MEDICINE

## 2019-11-25 PROCEDURE — 86860 RBC ANTIBODY ELUTION: CPT

## 2019-11-25 PROCEDURE — 86850 RBC ANTIBODY SCREEN: CPT | Performed by: INTERNAL MEDICINE

## 2019-11-25 PROCEDURE — 82150 ASSAY OF AMYLASE: CPT | Performed by: INTERNAL MEDICINE

## 2019-11-25 PROCEDURE — 86971 RBC PRETX INCUBATJ W/ENZYMES: CPT

## 2019-11-25 PROCEDURE — 86870 RBC ANTIBODY IDENTIFICATION: CPT

## 2019-11-25 PROCEDURE — 86880 COOMBS TEST DIRECT: CPT

## 2019-11-25 PROCEDURE — 86850 RBC ANTIBODY SCREEN: CPT

## 2019-11-25 RX ORDER — HYDRALAZINE HYDROCHLORIDE 20 MG/ML
5 INJECTION INTRAMUSCULAR; INTRAVENOUS EVERY 6 HOURS PRN
Status: DISCONTINUED | OUTPATIENT
Start: 2019-11-25 | End: 2019-12-06 | Stop reason: HOSPADM

## 2019-11-25 RX ORDER — DOXAZOSIN MESYLATE 4 MG/1
4 TABLET ORAL
Status: DISCONTINUED | OUTPATIENT
Start: 2019-11-25 | End: 2019-12-06 | Stop reason: HOSPADM

## 2019-11-25 RX ORDER — HYDRALAZINE HYDROCHLORIDE 25 MG/1
75 TABLET, FILM COATED ORAL 3 TIMES DAILY
Status: DISCONTINUED | OUTPATIENT
Start: 2019-11-25 | End: 2019-11-25

## 2019-11-25 RX ORDER — HYDRALAZINE HYDROCHLORIDE 25 MG/1
75 TABLET, FILM COATED ORAL 3 TIMES DAILY
Status: DISCONTINUED | OUTPATIENT
Start: 2019-11-25 | End: 2019-11-27

## 2019-11-25 RX ORDER — METOPROLOL TARTRATE 50 MG/1
50 TABLET, FILM COATED ORAL EVERY 12 HOURS SCHEDULED
Status: DISCONTINUED | OUTPATIENT
Start: 2019-11-25 | End: 2019-12-06 | Stop reason: HOSPADM

## 2019-11-25 RX ADMIN — BUSPIRONE HYDROCHLORIDE 5 MG: 5 TABLET ORAL at 08:16

## 2019-11-25 RX ADMIN — HEPARIN SODIUM 5000 UNITS: 5000 INJECTION INTRAVENOUS; SUBCUTANEOUS at 21:55

## 2019-11-25 RX ADMIN — TACROLIMUS 3 MG: 1 CAPSULE ORAL at 06:27

## 2019-11-25 RX ADMIN — ASPIRIN 81 MG: 81 TABLET, COATED ORAL at 08:17

## 2019-11-25 RX ADMIN — AMLODIPINE BESYLATE 10 MG: 10 TABLET ORAL at 08:16

## 2019-11-25 RX ADMIN — CHOLECALCIFEROL TAB 25 MCG (1000 UNIT) 1000 UNITS: 25 TAB at 16:32

## 2019-11-25 RX ADMIN — INSULIN GLARGINE 5 UNITS: 100 INJECTION, SOLUTION SUBCUTANEOUS at 21:55

## 2019-11-25 RX ADMIN — DULOXETINE HYDROCHLORIDE 60 MG: 60 CAPSULE, DELAYED RELEASE ORAL at 08:17

## 2019-11-25 RX ADMIN — SERTRALINE HYDROCHLORIDE 200 MG: 100 TABLET ORAL at 08:17

## 2019-11-25 RX ADMIN — HYDRALAZINE HYDROCHLORIDE 75 MG: 25 TABLET ORAL at 21:54

## 2019-11-25 RX ADMIN — CEFEPIME HYDROCHLORIDE 1000 MG: 1 INJECTION, POWDER, FOR SOLUTION INTRAMUSCULAR; INTRAVENOUS at 11:34

## 2019-11-25 RX ADMIN — HYDRALAZINE HYDROCHLORIDE 75 MG: 25 TABLET ORAL at 16:31

## 2019-11-25 RX ADMIN — LORAZEPAM 2 MG: 1 TABLET ORAL at 10:27

## 2019-11-25 RX ADMIN — SODIUM BICARBONATE 650 MG TABLET 1300 MG: at 08:17

## 2019-11-25 RX ADMIN — TACROLIMUS 2 MG: 1 CAPSULE ORAL at 21:54

## 2019-11-25 RX ADMIN — ARIPIPRAZOLE 20 MG: 5 TABLET ORAL at 21:53

## 2019-11-25 RX ADMIN — HYDRALAZINE HYDROCHLORIDE 50 MG: 25 TABLET ORAL at 08:16

## 2019-11-25 RX ADMIN — DULOXETINE HYDROCHLORIDE 60 MG: 60 CAPSULE, DELAYED RELEASE ORAL at 16:32

## 2019-11-25 RX ADMIN — FERROUS SULFATE TAB 325 MG (65 MG ELEMENTAL FE) 325 MG: 325 (65 FE) TAB at 08:17

## 2019-11-25 RX ADMIN — CLONIDINE HYDROCHLORIDE 0.3 MG: 0.1 TABLET ORAL at 06:23

## 2019-11-25 RX ADMIN — CHOLECALCIFEROL TAB 25 MCG (1000 UNIT) 1000 UNITS: 25 TAB at 08:17

## 2019-11-25 RX ADMIN — SEVELAMER HYDROCHLORIDE 800 MG: 800 TABLET, FILM COATED PARENTERAL at 11:49

## 2019-11-25 RX ADMIN — SODIUM BICARBONATE 650 MG TABLET 1300 MG: at 21:53

## 2019-11-25 RX ADMIN — CLONIDINE HYDROCHLORIDE 0.3 MG: 0.1 TABLET ORAL at 21:54

## 2019-11-25 RX ADMIN — PREDNISONE 5 MG: 5 TABLET ORAL at 08:16

## 2019-11-25 RX ADMIN — SEVELAMER HYDROCHLORIDE 800 MG: 800 TABLET, FILM COATED PARENTERAL at 08:18

## 2019-11-25 RX ADMIN — BUSPIRONE HYDROCHLORIDE 5 MG: 5 TABLET ORAL at 16:33

## 2019-11-25 RX ADMIN — HEPARIN SODIUM 5000 UNITS: 5000 INJECTION INTRAVENOUS; SUBCUTANEOUS at 13:43

## 2019-11-25 RX ADMIN — DOXAZOSIN 4 MG: 4 TABLET ORAL at 21:54

## 2019-11-25 RX ADMIN — CHOLECALCIFEROL TAB 25 MCG (1000 UNIT) 1000 UNITS: 25 TAB at 21:54

## 2019-11-25 RX ADMIN — METOPROLOL TARTRATE 50 MG: 50 TABLET, FILM COATED ORAL at 21:54

## 2019-11-25 RX ADMIN — PRAVASTATIN SODIUM 80 MG: 80 TABLET ORAL at 08:17

## 2019-11-25 RX ADMIN — HYDRALAZINE HYDROCHLORIDE 5 MG: 20 INJECTION INTRAMUSCULAR; INTRAVENOUS at 10:30

## 2019-11-25 RX ADMIN — SODIUM BICARBONATE 650 MG TABLET 1300 MG: at 16:31

## 2019-11-25 RX ADMIN — METOPROLOL TARTRATE 50 MG: 50 TABLET, FILM COATED ORAL at 08:17

## 2019-11-25 RX ADMIN — CLONIDINE HYDROCHLORIDE 0.3 MG: 0.1 TABLET ORAL at 13:43

## 2019-11-25 RX ADMIN — HEPARIN SODIUM 5000 UNITS: 5000 INJECTION INTRAVENOUS; SUBCUTANEOUS at 06:23

## 2019-11-25 RX ADMIN — FOLIC ACID 1000 MCG: 1 TABLET ORAL at 08:17

## 2019-11-25 RX ADMIN — LEVOTHYROXINE SODIUM 125 MCG: 125 TABLET ORAL at 06:23

## 2019-11-25 NOTE — PROGRESS NOTES
Progress Note - Owen Laird 1965, 53 y o  female MRN: [id number]    Unit/Bed#: S -01 Encounter: [id number]    Primary Care Provider: Clarita Jones MD   Date and time admitted to hospital: 11/23/2019  2:47 PM        Essential hypertension  Assessment & Plan  Blood pressure elevated today 181/74, 183/76 and 172/68  Patient states that she is anxious over possibility of being a dialysis candidate  Plan:  · Continue Norvasc 10 mg daily  · Continue clonidine 0 3 mg t i d   · Continue doxazosin 4 mg daily  · Increase hydralazine to 75 mg t i d   · Start hydralazine 5 mg injection p r n  · Appreciate Nephrology recommendations      * Recurrent UTI  Assessment & Plan  CT scan showing stranding  Systemically not unwell  Plan:  · Appreciate ID consult   · Continue cefepime   · C/w immunosuppressives for renal transplant in 1998   · Urine culture: pending     Multiple myeloma not having achieved remission [facility])  Assessment & Plan  Patient with past medical history of multiple myeloma not having achieved remission  · C/w with treatment  Anemia  Assessment & Plan  Patient with chronic kidney disease and as a result of chronic anemia  Hemoglobin 7 3 at baseline  · 11/25 patient's hemoglobin decreased to 6 6    Plan:  · Monitor CBC  in a m  · Take oral ferrous sulfate  · Obtained consent for blood  · We will transfuse 1 packed red blood cell unit    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 [facility])  Assessment & Plan  Lab Results   Component Value Date    HGBA1C 5 1 09/09/2019       Recent Labs     11/24/19  1610 11/24/19  2115 11/25/19  0753 11/25/19  1054   POCGLU 153* 83 101 108       Blood Sugar Average: Last 72 hrs:  (P) 108 25   Lantus 5 units  Chronic kidney disease, stage 4 (severe) ([facility] [address] )  Assessment & Plan  Patient with past medical history of renal transplant in 1998  Patient's baseline creatinine is 2 4-2 8    On admission patient's creatinine is at 3 2   ·  Cr 3 15    Plan  · IVF  Appreciate nephrology input  · Monitor creatinine levels  · Nephrology consulted given transplant     Renal transplant recipient  Assessment & Plan  Renal and pancreatic transplant recipient in   Plan:  · Continue tacrolimus and prednisone  · Monitor creatinine        VTE Pharmacologic Prophylaxis:   Pharmacologic: Heparin  Mechanical VTE Prophylaxis in Place: Yes    Discussions with Specialists or Other Care Team Provider: Nephrology     Education and Discussions with Family / Patient: Consent for blood, hypertension management     Current Length of Stay: 2 day(s)    Current Patient Status: Inpatient     Discharge Plan / Estimated Discharge Date: N/A    Code Status: Level 1 - Full Code      Subjective:   Patient today is anxious as she was told she might be a candidate for dialysis  This raised a lot of questions in her mind  She was able to void urine independently and hs not needed assistance of straight catherteization  She is still having burning on urination however the color of her urine and smell is improving  She also states that she is not thinking clearly  She has received blood before and gave consent for transfusion  Objective:     Vitals:   Temp (24hrs), Av 1 °F (36 7 °C), Min:97 9 °F (36 6 °C), Max:98 4 °F (36 9 °C)    Temp:  [97 9 °F (36 6 °C)-98 4 °F (36 9 °C)] 97 9 °F (36 6 °C)  HR:  [53-65] 58  Resp:  [18] 18  BP: (160-183)/(60-76) 180/60  SpO2:  [93 %-95 %] 93 %  There is no height or weight on file to calculate BMI  Input and Output Summary (last 24 hours): Intake/Output Summary (Last 24 hours) at 2019 1201  Last data filed at 2019 1101  Gross per 24 hour   Intake 1705 83 ml   Output 850 ml   Net 855 83 ml       Physical Exam:     Physical Exam   Constitutional: She appears well-developed and well-nourished  HENT:   Head: Normocephalic and atraumatic  Cardiovascular: Normal rate and regular rhythm     Murmur heard   Pulmonary/Chest: Effort normal and breath sounds normal    Abdominal: Bowel sounds are normal  There is tenderness  There is guarding  Neurological: She is alert  Additional Data:     Labs:    Results from last 7 days   Lab Units 11/25/19  0459  11/23/19  1646   WBC Thousand/uL 5 39   < > 6 70   HEMOGLOBIN g/dL 6 6*   < > 7 1*   HEMATOCRIT % 22 1*   < > 23 3*   PLATELETS Thousands/uL 145*   < > 149   NEUTROS PCT %  --   --  54   LYMPHS PCT %  --   --  14   MONOS PCT %  --   --  11   EOS PCT %  --   --  18*    < > = values in this interval not displayed  Results from last 7 days   Lab Units 11/25/19  0459   POTASSIUM mmol/L 4 4   CHLORIDE mmol/L 110*   CO2 mmol/L 18*   BUN mg/dL 45*   CREATININE mg/dL 3 15*   CALCIUM mg/dL 7 7*   ALK PHOS U/L 93   ALT U/L 9*   AST U/L 12           * I Have Reviewed All Lab Data Listed Above  * Additional Pertinent Lab Tests Reviewed:  [address] Admission Reviewed    Imaging:    Imaging Reports Reviewed Today Include: N/A  Imaging Personally Reviewed by Myself Includes:  N/A    Recent Cultures (last 7 days):           Last 24 Hours Medication List:     Current Facility-Administered Medications:  acetaminophen 650 mg Oral Q6H PRN Javy Parsons PA-C    amLODIPine 10 mg Oral QAM Gloria Bower MD    ARIPiprazole 20 mg Oral HS Kanwal Constantino MD    aspirin 81 mg Oral Daily Kanwal Constantino MD    busPIRone 5 mg Oral BID Kanwal Constantino MD    cefepime 1,000 mg Intravenous Q24H Lisa Becerra MD Last Rate: 1,000 mg (11/25/19 1134)   cholecalciferol 1,000 Units Oral TID Kanwal Constantino MD    cloNIDine 0 3 mg Oral Q8H [address] Kanwal Constantino MD    doxazosin 4 mg Oral HS Belia Marquez PA-C    DULoxetine 60 mg Oral BID Kanwal Constantino MD    ferrous sulfate 325 mg Oral Daily With Breakfast Kanwal Constantino MD    folic acid 5,409 mcg Oral Daily Kanwal Constantino MD    heparin (porcine) 5,000 Units Subcutaneous Q8H [address] Kanwal Constantino MD hydrALAZINE 5 mg Intravenous Q6H PRN Amol Walker MD    hydrALAZINE 75 mg Oral TID [facility], PA-C    insulin glargine 5 Units Subcutaneous HS Lyn Barclay MD    insulin lispro 1-6 Units Subcutaneous TID AC Javy Parsons, PAFaraC    insulin lispro 1-6 Units Subcutaneous HS Javy Navarrete, PAFaraC    levothyroxine 125 mcg Oral Early Morning Lyn Barclay MD    LORazepam 2 mg Oral TID PRN Lyn Barclay MD    metoprolol tartrate 50 mg Oral Q12H [address] [facility], PAFaraC    ondansetron 4 mg Intravenous Q8H PRN GISEL WilkinsC    pravastatin 80 mg Oral Daily Lyn Barclay MD    predniSONE 5 mg Oral Daily Lyn Barclay MD    sertraline 200 mg Oral Daily Lyn Barclay MD    sevelamer 800 mg Oral TID With Meals Lyn Barclay MD    sodium bicarbonate 1,300 mg Oral TID Lyn Barclay MD    tacrolimus 2 mg Oral HS Diamond Freeman MD    tacrolimus 3 mg Oral Daily Diamond Freeman MD         Today, Patient Was Seen By: Miracle Mcclure MD    ** Please Note: This note has been constructed using a voice recognition system   **

## 2019-11-25 NOTE — ASSESSMENT & PLAN NOTE
Patient with past medical history of multiple myeloma not having achieved remission  · C/w with treatment

## 2019-11-25 NOTE — PLAN OF CARE
Problem: Potential for Falls  Goal: Patient will remain free of falls  Description  INTERVENTIONS:  - Assess patient frequently for physical needs  -  Identify cognitive and physical deficits and behaviors that affect risk of falls    -  Holder fall precautions as indicated by assessment   - Educate patient/family on patient safety including physical limitations  - Instruct patient to call for assistance with activity based on assessment  - Modify environment to reduce risk of injury  - Consider OT/PT consult to assist with strengthening/mobility  Outcome: Progressing     Problem: PAIN - ADULT  Goal: Verbalizes/displays adequate comfort level or baseline comfort level  Description  Interventions:  - Encourage patient to monitor pain and request assistance  - Assess pain using appropriate pain scale  - Administer analgesics based on type and severity of pain and evaluate response  - Implement non-pharmacological measures as appropriate and evaluate response  - Consider cultural and social influences on pain and pain management  - Notify physician/advanced practitioner if interventions unsuccessful or patient reports new pain  Outcome: Progressing     Problem: INFECTION - ADULT  Goal: Absence or prevention of progression during hospitalization  Description  INTERVENTIONS:  - Assess and monitor for signs and symptoms of infection  - Monitor lab/diagnostic results  - Monitor all insertion sites, i e  indwelling lines, tubes, and drains  - Monitor endotracheal if appropriate and nasal secretions for changes in amount and color  - Holder appropriate cooling/warming therapies per order  - Administer medications as ordered  - Instruct and encourage patient and family to use good hand hygiene technique  - Identify and instruct in appropriate isolation precautions for identified infection/condition  Outcome: Progressing     Problem: GENITOURINARY - ADULT  Goal: Maintains or returns to baseline urinary function  Description  INTERVENTIONS:  - Assess urinary function  - Encourage oral fluids to ensure adequate hydration if ordered  - Administer IV fluids as ordered to ensure adequate hydration  - Administer ordered medications as needed  - Offer frequent toileting  - Follow urinary retention protocol if ordered  Outcome: Progressing  Goal: Absence of urinary retention  Description  INTERVENTIONS:  - Assess patients ability to void and empty bladder  - Monitor I/O  - Bladder scan as needed  - Discuss with physician/AP medications to alleviate retention as needed  - Discuss catheterization for long term situations as appropriate  Outcome: Progressing

## 2019-11-25 NOTE — PROGRESS NOTES
Progress Note - Infectious Disease   Robert Stevenson 53 y o  female MRN: [id number]  Unit/Bed#: S -01 Encounter: [id number]      Impression/Plan:  1  Recurrent UTI  In the setting of # 2/3  Consider ongoing anatomic problem  Consider antibiotic failure as prior urine culture negative and antibiotic treatment was empiric  Reported high fever and confusion at home  No sepsis criteria here  Difficult to ascertain true infection versus colonization in this patient with complicated urological history, but some of features of presentation do suggest active infection  History of relatively susceptible E coli in the past   Recent treatment exposure to 3rd generation cephalosporin  Rec:  ? Continue cefepime pending final urine culture  ? Follow up urine culture  ? Follow temperatures closely  ? Supportive care as per the primary service     2  Recent urinary retention  Status post recent Moreno, removed on Friday  Bladder scan in the ED normal   Rec:  ? Initiate urinary retention protocol     3  History of renal/pancreatic transplant     Question of pancreas draining into the bladder? On chronic immunosuppression  Rec:  ? Continue home immunosuppression regimen     4  EDUARDO on CKD  Likely multifactorial   Baseline around 2 5  Rec:  ? Follow creatinine closely and dose-adjust antibiotics as indicated  ? Recheck BMP in AM  ? Renal follow-up ongoing     5  MM  Recently started on Revlimid      The above impression and plan was discussed in detail with the [facility] service      Antibiotics:  Cefepime day 2  abx # 3    Subjective:  Patient has no fever, chills, sweats; no nausea, vomiting, diarrhea; no cough, shortness of breath;   Dysuria and SP pain improving  No new symptoms      Objective:  Vitals:  Temp:  [97 9 °F (36 6 °C)-98 4 °F (36 9 °C)] 97 9 °F (36 6 °C)  HR:  [53-65] 58  Resp:  [18] 18  BP: (160-183)/(60-76) 169/72  SpO2:  [93 %-95 %] 93 %  Temp (24hrs), Av 1 °F (36 7 °C), Min:97 9 °F (36 6 °C), Max:98 4 °F (36 9 °C)  Current: Temperature: 97 9 °F (36 6 °C)    Physical Exam:   General Appearance:  Alert, interactive, nontoxic, no acute distress, propped at bedside eating lunch tray  Throat: Oropharynx moist without lesions  Lungs:   Clear to auscultation bilaterally; no wheezes, rhonchi or rales; respirations unlabored   Heart:  RRR; no murmur   Abdomen:   Soft, no focal tenderness, protuberant, positive bowel sounds  : No hinojosa, No SPT   Extremities: No clubbing or cyanosis, no edema, IV site nontender  Skin: No new rashes or lesions       Labs, Imaging, & Other studies:   All pertinent labs and imaging studies were personally reviewed  Results from last 7 days   Lab Units 11/25/19  0459 11/24/19  0511 11/23/19  1646   WBC Thousand/uL 5 39 6 48 6 70   HEMOGLOBIN g/dL 6 6* 7 1* 7 1*   PLATELETS Thousands/uL 145* 147* 149     Results from last 7 days   Lab Units 11/25/19  0459 11/24/19  0511 11/23/19  1646   POTASSIUM mmol/L 4 4 4 3 4 1   CHLORIDE mmol/L 110* 111* 109*   CO2 mmol/L 18* 17* 18*   BUN mg/dL 45* 42* 44*   CREATININE mg/dL 3 15* 3 05* 3 20*   EGFR ml/min/1 73sq m 16 17 16   CALCIUM mg/dL 7 7* 7 9* 8 3   AST U/L 12 14 16   ALT U/L 9* 17 25   ALK PHOS U/L 93 104 104

## 2019-11-25 NOTE — ASSESSMENT & PLAN NOTE
Blood pressure elevated today 181/74, 183/76 and 172/68  Patient states that she is anxious over possibility of being a dialysis candidate  Plan:  · Continue Norvasc 10 mg daily  · Continue clonidine 0 3 mg t i d   · Continue doxazosin 4 mg daily  · Increase hydralazine to 75 mg t i d   · Start hydralazine 5 mg injection p r n    · Appreciate Nephrology recommendations

## 2019-11-25 NOTE — ASSESSMENT & PLAN NOTE
CT scan showing stranding  Systemically not unwell       Plan:  · Appreciate ID consult   · Continue cefepime   · C/w immunosuppressives for renal transplant in 1998   · Urine culture: pending

## 2019-11-25 NOTE — NURSING NOTE
Followed up with our lab about the 1 unit of blood  Patient was (+) for antibodies and they needed to do a further workup  They are still awaiting results from [facility]

## 2019-11-25 NOTE — PROGRESS NOTES
NEPHROLOGY PROGRESS NOTE   Domitila Kennedy 53 y o  female MRN: [id number]  Unit/Bed#: S -01 Encounter: [id number]  Reason for Consult: EDUARDO/CKD    ASSESSMENT/PLAN:  1  Acute Kidney Injury, POA- secondary to prerenal azotemia +/- ATN from UTI/infection with anemia  - would consider giving timed IVF with bicarbonate but will hold off today as we are giving PRBC  - UA: dirty  - PVR: 85ml (11/23)  - creatinine stable since admission  2  Chronic Kidney Disease stage IV- follows with Dr Natalie Price  Baseline creatinine is 2 5   3  Status post kidney/pancreas transplant in 1998   4  Immunosuppressives- continue prednisone 5mg daily and tacrolimus 3mg AM & 2mg PM  - goal tacrolimus is 3-4  - check tac level  5  Hypertension- BP elevated  - agree with increasing hydralazine to 75mg TID  - low salt diet  - left subclavian stenosis  6  Low bicarbonate tablets- continue sodium bicarbonate tablets 1300mg TID  7  Hyperphosphatemia- continue renagel 800mg TID with meals  8  Anemia- receiving PRBC 11/25/19  9  Recurrent UTI- ID on board, started on cefepime  10  Multiple Myeloma- hematology follow up    Disposition:  Discussed with [facility]    SUBJECTIVE:  Patient is very tired, denies sob    States she is urinating okay on her own without need for straight cath    OBJECTIVE:  Current Weight:    Vitals:    11/25/19 0623 11/25/19 0759 11/25/19 0806 11/25/19 0900   BP: (!) 181/74 (!) 183/76 (!) 172/68 (!) 180/60   BP Location:  Left arm Left arm Left arm   Pulse: (!) 53 55  58   Resp:  18     Temp:  97 9 °F (36 6 °C)     TempSrc:  Oral     SpO2:  93%         Intake/Output Summary (Last 24 hours) at 11/25/2019 1033  Last data filed at 11/25/2019 0801  Gross per 24 hour   Intake 1705 83 ml   Output 600 ml   Net 1105 83 ml     General: NAD  Skin: no rash  Eyes: anicteric  ENMT: mm dry  Neck: no masses  Respiratory: CTAB  Cardiac: RRR  Extremities: no edema  GI: soft nt nd  Neuro: alert awake  Psych: mood and affect appropriate    Medications:    Current Facility-Administered Medications:     acetaminophen (TYLENOL) tablet 650 mg, 650 mg, Oral, Q6H PRN, Javy Parsons PA-C, 650 mg at 11/24/19 1756    amLODIPine (NORVASC) tablet 10 mg, 10 mg, Oral, QAM, Diamond Freeman MD, 10 mg at 11/25/19 0816    ARIPiprazole (ABILIFY) tablet 20 mg, 20 mg, Oral, HS, Lyn Barclay MD, 20 mg at 11/24/19 2202    aspirin (ECOTRIN LOW STRENGTH) EC tablet 81 mg, 81 mg, Oral, Daily, Lyn Barclay MD, 81 mg at 11/25/19 0817    busPIRone (BUSPAR) tablet 5 mg, 5 mg, Oral, BID, Lyn Barclay MD, 5 mg at 11/25/19 0816    cefepime (MAXIPIME) 1,000 mg in dextrose 5 % 50 mL IVPB, 1,000 mg, Intravenous, Q24H, Silvestre Rivers MD, Last Rate: 100 mL/hr at 11/24/19 1304, 1,000 mg at 11/24/19 1304    cholecalciferol (VITAMIN D3) tablet 1,000 Units, 1,000 Units, Oral, TID, Lyn Barclay MD, 1,000 Units at 11/25/19 0817    cloNIDine (CATAPRES) tablet 0 3 mg, 0 3 mg, Oral, Q8H [address], Lyn Barclay MD, 0 3 mg at 11/25/19 9932    doxazosin (CARDURA) tablet 4 mg, 4 mg, Oral, HS, Lyn Barclay MD, 4 mg at 11/24/19 2202    DULoxetine (CYMBALTA) delayed release capsule 60 mg, 60 mg, Oral, BID, Lyn Barclay MD, 60 mg at 11/25/19 5453    ferrous sulfate tablet 325 mg, 325 mg, Oral, Daily With Breakfast, Lyn Barclay MD, 325 mg at 54/17/02 1669    folic acid (FOLVITE) tablet 1,000 mcg, 1,000 mcg, Oral, Daily, Lyn Barclay MD, 1,000 mcg at 11/25/19 0817    heparin (porcine) subcutaneous injection 5,000 Units, 5,000 Units, Subcutaneous, Q8H [address], 5,000 Units at 11/25/19 4737 **AND** Platelet count, , , Once, Lyn Barclay MD    hydrALAZINE (APRESOLINE) injection 5 mg, 5 mg, Intravenous, Q6H PRN, Amol Walker MD    hydrALAZINE (APRESOLINE) tablet 75 mg, 75 mg, Oral, TID, Amol Walker MD    insulin glargine (LANTUS) subcutaneous injection 5 Units 0 05 mL, 5 Units, Subcutaneous, HS, Lyn Barclay MD, 5 Units at 11/24/19 2202    insulin lispro (HumaLOG) 100 units/mL subcutaneous injection 1-6 Units, 1-6 Units, Subcutaneous, TID AC, 1 Units at 11/24/19 1710 **AND** Fingerstick Glucose (POCT), , , TID AC, Javy Parsons PA-C    insulin lispro (HumaLOG) 100 units/mL subcutaneous injection 1-6 Units, 1-6 Units, Subcutaneous, HS, Javy Parsons PA-C, Stopped at 11/23/19 2147    levothyroxine tablet 125 mcg, 125 mcg, Oral, Early Morning, Aida Gandara MD, 125 mcg at 11/25/19 2518    LORazepam (ATIVAN) tablet 2 mg, 2 mg, Oral, TID PRN, Aida Gandara MD, 2 mg at 11/25/19 1027    metoprolol tartrate (LOPRESSOR) tablet 50 mg, 50 mg, Oral, Q12H [address], Javy Parsons PA-C, 50 mg at 11/25/19 0817    ondansetron (ZOFRAN) injection 4 mg, 4 mg, Intravenous, Q8H PRN, Gregoria Santos PA-C, 4 mg at 11/24/19 1602    pravastatin (PRAVACHOL) tablet 80 mg, 80 mg, Oral, Daily, Aida Gandara MD, 80 mg at 11/25/19 1338    predniSONE tablet 5 mg, 5 mg, Oral, Daily, Aida Gandara MD, 5 mg at 11/25/19 9336    sertraline (ZOLOFT) tablet 200 mg, 200 mg, Oral, Daily, Aida Gandara MD, 200 mg at 11/25/19 4938    sevelamer (RENAGEL) tablet 800 mg, 800 mg, Oral, TID With Meals, Aida Gandara MD, 800 mg at 11/25/19 0818    sodium bicarbonate tablet 1,300 mg, 1,300 mg, Oral, TID, Aida Gandara MD, 1,300 mg at 11/25/19 0817    tacrolimus (PROGRAF) capsule 2 mg, 2 mg, Oral, HS, Yumi Fragoso MD    tacrolimus (PROGRAF) capsule 3 mg, 3 mg, Oral, Daily, Yumi Fragoso MD, 3 mg at 11/25/19 7916    Laboratory Results:  Results from last 7 days   Lab Units 11/25/19  0459 11/24/19  0511 11/23/19  1646   WBC Thousand/uL 5 39 6 48 6 70   HEMOGLOBIN g/dL 6 6* 7 1* 7 1*   HEMATOCRIT % 22 1* 23 8* 23 3*   PLATELETS Thousands/uL 145* 147* 149   POTASSIUM mmol/L 4 4 4 3 4 1   CHLORIDE mmol/L 110* 111* 109*   CO2 mmol/L 18* 17* 18*   BUN mg/dL 45* 42* 44*   CREATININE mg/dL 3 15* 3 05* 3 20*   CALCIUM mg/dL 7 7* 7 9* 8 3

## 2019-11-25 NOTE — ASSESSMENT & PLAN NOTE
Patient with past medical history of renal transplant in 1998  Patient's baseline creatinine is 2 4-2 8  On admission patient's creatinine is at 3 2   · 11/25 Cr 3 15    Plan  · IVF   Appreciate nephrology input  · Monitor creatinine levels  · Nephrology consulted given transplant

## 2019-11-25 NOTE — ASSESSMENT & PLAN NOTE
Patient with chronic kidney disease and as a result of chronic anemia  Hemoglobin 7 3 at baseline  · 11/25 patient's hemoglobin decreased to 6 6    Plan:  · Monitor CBC  in a m    · Take oral ferrous sulfate  · Obtained consent for blood  · We will transfuse 1 packed red blood cell unit

## 2019-11-25 NOTE — ASSESSMENT & PLAN NOTE
Lab Results   Component Value Date    HGBA1C 5 1 09/09/2019       Recent Labs     11/24/19  1610 11/24/19  2115 11/25/19  0753 11/25/19  1054   POCGLU 153* 83 101 108       Blood Sugar Average: Last 72 hrs:  (P) 108 25   Lantus 5 units

## 2019-11-25 NOTE — NURSING NOTE
Pt had a blood pressure of 183/76 at 0759 this morning and received her scheduled blood pressure medications around 0816  Her blood pressure was rechecked manually around 0930 and it was 180/60 with a heart rate of 58  It should also be noted her hgb is 6 6 this morning  AJAY was notified [id number] via [facility]

## 2019-11-26 LAB
ABO GROUP BLD BPU: NORMAL
ANION GAP SERPL CALCULATED.3IONS-SCNC: 15 MMOL/L (ref 4–13)
ANTIBODY ID. #3: NORMAL
BLOOD GROUP ANTIBODIES SERPL: NORMAL
BLOOD GROUP ANTIBODIES SERPL: NORMAL
BPU ID: NORMAL
BUN SERPL-MCNC: 46 MG/DL (ref 5–25)
CALCIUM SERPL-MCNC: 8.1 MG/DL (ref 8.3–10.1)
CHLORIDE SERPL-SCNC: 111 MMOL/L (ref 100–108)
CO2 SERPL-SCNC: 18 MMOL/L (ref 21–32)
CREAT SERPL-MCNC: 3.2 MG/DL (ref 0.6–1.3)
CROSSMATCH: NORMAL
DAT C3-SP REAG RBC QL: NORMAL
DAT IGG-SP REAG RBCCO QL: NORMAL
DAT POLY-SP REAG RBC QL: NORMAL
ERYTHROCYTE [DISTWIDTH] IN BLOOD BY AUTOMATED COUNT: 18.7 % (ref 11.6–15.1)
GFR SERPL CREATININE-BSD FRML MDRD: 16 ML/MIN/1.73SQ M
GLUCOSE SERPL-MCNC: 101 MG/DL (ref 65–140)
GLUCOSE SERPL-MCNC: 109 MG/DL (ref 65–140)
GLUCOSE SERPL-MCNC: 109 MG/DL (ref 65–140)
GLUCOSE SERPL-MCNC: 119 MG/DL (ref 65–140)
GLUCOSE SERPL-MCNC: 127 MG/DL (ref 65–140)
HCT VFR BLD AUTO: 26.7 % (ref 34.8–46.1)
HGB BLD-MCNC: 8 G/DL (ref 11.5–15.4)
MCH RBC QN AUTO: 30.5 PG (ref 26.8–34.3)
MCHC RBC AUTO-ENTMCNC: 30 G/DL (ref 31.4–37.4)
MCV RBC AUTO: 102 FL (ref 82–98)
PLATELET # BLD AUTO: 153 THOUSANDS/UL (ref 149–390)
PMV BLD AUTO: 11.8 FL (ref 8.9–12.7)
POTASSIUM SERPL-SCNC: 4.3 MMOL/L (ref 3.5–5.3)
RBC # BLD AUTO: 2.62 MILLION/UL (ref 3.81–5.12)
SODIUM SERPL-SCNC: 144 MMOL/L (ref 136–145)
UNIT DISPENSE STATUS: NORMAL
UNIT PRODUCT CODE: NORMAL
UNIT RH: NORMAL
WBC # BLD AUTO: 5.73 THOUSAND/UL (ref 4.31–10.16)

## 2019-11-26 PROCEDURE — 82948 REAGENT STRIP/BLOOD GLUCOSE: CPT

## 2019-11-26 PROCEDURE — P9040 RBC LEUKOREDUCED IRRADIATED: HCPCS

## 2019-11-26 PROCEDURE — 80197 ASSAY OF TACROLIMUS: CPT | Performed by: INTERNAL MEDICINE

## 2019-11-26 PROCEDURE — 99232 SBSQ HOSP IP/OBS MODERATE 35: CPT | Performed by: INTERNAL MEDICINE

## 2019-11-26 PROCEDURE — 86870 RBC ANTIBODY IDENTIFICATION: CPT | Performed by: INTERNAL MEDICINE

## 2019-11-26 PROCEDURE — 86902 BLOOD TYPE ANTIGEN DONOR EA: CPT

## 2019-11-26 PROCEDURE — 85027 COMPLETE CBC AUTOMATED: CPT | Performed by: PSYCHIATRY & NEUROLOGY

## 2019-11-26 PROCEDURE — 80048 BASIC METABOLIC PNL TOTAL CA: CPT | Performed by: PSYCHIATRY & NEUROLOGY

## 2019-11-26 PROCEDURE — 99232 SBSQ HOSP IP/OBS MODERATE 35: CPT | Performed by: HOSPITALIST

## 2019-11-26 RX ADMIN — HYDRALAZINE HYDROCHLORIDE 75 MG: 25 TABLET ORAL at 17:26

## 2019-11-26 RX ADMIN — LEVOTHYROXINE SODIUM 125 MCG: 125 TABLET ORAL at 06:35

## 2019-11-26 RX ADMIN — ARIPIPRAZOLE 20 MG: 5 TABLET ORAL at 22:05

## 2019-11-26 RX ADMIN — DULOXETINE HYDROCHLORIDE 60 MG: 60 CAPSULE, DELAYED RELEASE ORAL at 17:27

## 2019-11-26 RX ADMIN — SODIUM BICARBONATE 650 MG TABLET 1300 MG: at 22:06

## 2019-11-26 RX ADMIN — SEVELAMER HYDROCHLORIDE 800 MG: 800 TABLET, FILM COATED PARENTERAL at 17:39

## 2019-11-26 RX ADMIN — BUSPIRONE HYDROCHLORIDE 5 MG: 5 TABLET ORAL at 09:28

## 2019-11-26 RX ADMIN — HEPARIN SODIUM 5000 UNITS: 5000 INJECTION INTRAVENOUS; SUBCUTANEOUS at 22:05

## 2019-11-26 RX ADMIN — HEPARIN SODIUM 5000 UNITS: 5000 INJECTION INTRAVENOUS; SUBCUTANEOUS at 13:12

## 2019-11-26 RX ADMIN — LORAZEPAM 2 MG: 1 TABLET ORAL at 15:22

## 2019-11-26 RX ADMIN — PREDNISONE 5 MG: 5 TABLET ORAL at 09:28

## 2019-11-26 RX ADMIN — BUSPIRONE HYDROCHLORIDE 5 MG: 5 TABLET ORAL at 17:27

## 2019-11-26 RX ADMIN — FOLIC ACID 1000 MCG: 1 TABLET ORAL at 09:28

## 2019-11-26 RX ADMIN — HYDRALAZINE HYDROCHLORIDE 75 MG: 25 TABLET ORAL at 09:31

## 2019-11-26 RX ADMIN — CLONIDINE HYDROCHLORIDE 0.3 MG: 0.1 TABLET ORAL at 22:05

## 2019-11-26 RX ADMIN — CHOLECALCIFEROL TAB 25 MCG (1000 UNIT) 1000 UNITS: 25 TAB at 22:05

## 2019-11-26 RX ADMIN — PRAVASTATIN SODIUM 80 MG: 80 TABLET ORAL at 09:28

## 2019-11-26 RX ADMIN — CLONIDINE HYDROCHLORIDE 0.3 MG: 0.1 TABLET ORAL at 13:12

## 2019-11-26 RX ADMIN — HYDRALAZINE HYDROCHLORIDE 5 MG: 20 INJECTION INTRAMUSCULAR; INTRAVENOUS at 04:10

## 2019-11-26 RX ADMIN — SODIUM BICARBONATE 650 MG TABLET 1300 MG: at 09:28

## 2019-11-26 RX ADMIN — METOPROLOL TARTRATE 50 MG: 50 TABLET, FILM COATED ORAL at 09:32

## 2019-11-26 RX ADMIN — METOPROLOL TARTRATE 50 MG: 50 TABLET, FILM COATED ORAL at 22:06

## 2019-11-26 RX ADMIN — SEVELAMER HYDROCHLORIDE 800 MG: 800 TABLET, FILM COATED PARENTERAL at 09:29

## 2019-11-26 RX ADMIN — SERTRALINE HYDROCHLORIDE 200 MG: 100 TABLET ORAL at 09:28

## 2019-11-26 RX ADMIN — SODIUM BICARBONATE 650 MG TABLET 1300 MG: at 17:27

## 2019-11-26 RX ADMIN — INSULIN GLARGINE 5 UNITS: 100 INJECTION, SOLUTION SUBCUTANEOUS at 22:06

## 2019-11-26 RX ADMIN — HEPARIN SODIUM 5000 UNITS: 5000 INJECTION INTRAVENOUS; SUBCUTANEOUS at 06:38

## 2019-11-26 RX ADMIN — AMLODIPINE BESYLATE 10 MG: 10 TABLET ORAL at 09:28

## 2019-11-26 RX ADMIN — DOXAZOSIN 4 MG: 4 TABLET ORAL at 22:05

## 2019-11-26 RX ADMIN — DULOXETINE HYDROCHLORIDE 60 MG: 60 CAPSULE, DELAYED RELEASE ORAL at 09:28

## 2019-11-26 RX ADMIN — CHOLECALCIFEROL TAB 25 MCG (1000 UNIT) 1000 UNITS: 25 TAB at 09:28

## 2019-11-26 RX ADMIN — TACROLIMUS 2 MG: 1 CAPSULE ORAL at 22:06

## 2019-11-26 RX ADMIN — HYDRALAZINE HYDROCHLORIDE 5 MG: 20 INJECTION INTRAMUSCULAR; INTRAVENOUS at 13:17

## 2019-11-26 RX ADMIN — ASPIRIN 81 MG: 81 TABLET, COATED ORAL at 09:28

## 2019-11-26 RX ADMIN — TACROLIMUS 3 MG: 1 CAPSULE ORAL at 09:28

## 2019-11-26 RX ADMIN — HYDRALAZINE HYDROCHLORIDE 75 MG: 25 TABLET ORAL at 22:05

## 2019-11-26 RX ADMIN — FERROUS SULFATE TAB 325 MG (65 MG ELEMENTAL FE) 325 MG: 325 (65 FE) TAB at 09:28

## 2019-11-26 RX ADMIN — DAPTOMYCIN 500 MG: 500 INJECTION, POWDER, LYOPHILIZED, FOR SOLUTION INTRAVENOUS at 11:44

## 2019-11-26 RX ADMIN — SEVELAMER HYDROCHLORIDE 800 MG: 800 TABLET, FILM COATED PARENTERAL at 11:50

## 2019-11-26 RX ADMIN — CHOLECALCIFEROL TAB 25 MCG (1000 UNIT) 1000 UNITS: 25 TAB at 17:27

## 2019-11-26 NOTE — PROGRESS NOTES
NEPHROLOGY PROGRESS NOTE   Prateek Valentine 53 y o  female MRN: [id number]  Unit/Bed#: S -01 Encounter: [id number]  Reason for Consult: EDUARDO/CKD    ASSESSMENT/PLAN:  1  Acute Kidney Injury, POA- secondary to prerenal azotemia with ATN from UTI/infection with anemia  - volume status appears euvolemic  - UA: dirty  - PVR: 85ml (11/23)  - creatinine stable since admission  2  Chronic Kidney Disease stage IV- follows with Dr Mya Rivera  Baseline creatinine is 2 5   3  Status post kidney/pancreas transplant in 1998   4  Immunosuppressives- continue prednisone 5mg daily and tacrolimus 3mg AM & 2mg PM  - goal tacrolimus is 3-4  - repeat tac level pending  5  Hypertension- BP elevated  - agree with increasing hydralazine to 75mg TID  - low salt diet  - left subclavian stenosis  6  Low bicarbonate tablets- continue sodium bicarbonate tablets 1300mg TID  7  Hyperphosphatemia- continue renagel 800mg TID with meals  - check phos in AM  8  Anemia- receiving PRBC 11/25/19  - hgb improved  - iron stores acceptable from October 2019  9  Recurrent UTI- ID on board, on cefepime  10  Multiple Myeloma- hematology follow up    SUBJECTIVE:  Patient feeling well overall  States she feels much better after receiving the blood transfusion yesterday  Eating well  Denies sob       OBJECTIVE:  Current Weight: Weight - Scale: 109 kg (241 lb 2 9 oz)  Vitals:    11/26/19 0357 11/26/19 0600 11/26/19 0635 11/26/19 0723   BP: (!) 178/52  (!) 156/40 160/52   BP Location:   Left arm Left arm   Pulse: 57  60 57   Resp: 18   18   Temp: 97 5 °F (36 4 °C)   98 °F (36 7 °C)   TempSrc:    Oral   SpO2:    96%   Weight:  109 kg (241 lb 2 9 oz)         Intake/Output Summary (Last 24 hours) at 11/26/2019 1031  Last data filed at 11/26/2019 0357  Gross per 24 hour   Intake 942 92 ml   Output 1250 ml   Net -307 08 ml     General: NAD  Skin: no rash  Eyes: anicteric  ENMT: mm moist  Neck: no masses  Respiratory: CTAB  Cardiac: RRR  Extremities: no edema  GI: soft nt nd  Neuro: alert awake  Psych: mood and affect appropriate    Medications:    Current Facility-Administered Medications:     acetaminophen (TYLENOL) tablet 650 mg, 650 mg, Oral, Q6H PRN, Javy Parsons PA-C, 650 mg at 11/24/19 1756    amLODIPine (NORVASC) tablet 10 mg, 10 mg, Oral, QAM, Shantal Sun MD, 10 mg at 11/26/19 0928    ARIPiprazole (ABILIFY) tablet 20 mg, 20 mg, Oral, HS, Taryn Champion MD, 20 mg at 11/25/19 2153    aspirin (ECOTRIN LOW STRENGTH) EC tablet 81 mg, 81 mg, Oral, Daily, Taryn Champion MD, 81 mg at 11/26/19 9611    busPIRone (BUSPAR) tablet 5 mg, 5 mg, Oral, BID, Taryn Champion MD, 5 mg at 11/26/19 1074    cholecalciferol (VITAMIN D3) tablet 1,000 Units, 1,000 Units, Oral, TID, Taryn Champion MD, 1,000 Units at 11/26/19 0928    cloNIDine (CATAPRES) tablet 0 3 mg, 0 3 mg, Oral, Q8H [address], Taryn Champion MD, Stopped at 11/26/19 1253    DAPTOmycin (CUBICIN) 500 mg in sodium chloride 0 9 % 50 mL IVPB, 6 mg/kg (Adjusted), Intravenous, Q48H, Stiven MD Loc    doxazosin (CARDURA) tablet 4 mg, 4 mg, Oral, HS, Belia Marquez, JOHN, 4 mg at 11/25/19 2154    DULoxetine (CYMBALTA) delayed release capsule 60 mg, 60 mg, Oral, BID, Taryn Champion MD, 60 mg at 11/26/19 8692    ferrous sulfate tablet 325 mg, 325 mg, Oral, Daily With Breakfast, Taryn Champion MD, 325 mg at 32/60/94 3027    folic acid (FOLVITE) tablet 1,000 mcg, 1,000 mcg, Oral, Daily, Taryn Champion MD, 1,000 mcg at 11/26/19 0445    heparin (porcine) subcutaneous injection 5,000 Units, 5,000 Units, Subcutaneous, Q8H [address], 5,000 Units at 11/26/19 1868 **AND** Platelet count, , , Once, Taryn hCampion MD    hydrALAZINE (APRESOLINE) injection 5 mg, 5 mg, Intravenous, Q6H PRN, Kehinde Felton MD, 5 mg at 11/26/19 0410    hydrALAZINE (APRESOLINE) tablet 75 mg, 75 mg, Oral, TID, Belia [address], PA-C, 75 mg at 11/26/19 0931    insulin glargine (LANTUS) subcutaneous injection 5 Units 0 05 mL, 5 Units, Subcutaneous, HS, Felipe Walker MD, 5 Units at 11/25/19 2155    insulin lispro (HumaLOG) 100 units/mL subcutaneous injection 1-6 Units, 1-6 Units, Subcutaneous, TID AC, 1 Units at 11/24/19 1710 **AND** Fingerstick Glucose (POCT), , , TID AC, Javy Parsons PA-C    insulin lispro (HumaLOG) 100 units/mL subcutaneous injection 1-6 Units, 1-6 Units, Subcutaneous, HS, Javy Parsons PA-C, Stopped at 11/23/19 2147    levothyroxine tablet 125 mcg, 125 mcg, Oral, Early Morning, Felipe Walker MD, 125 mcg at 11/26/19 3758    LORazepam (ATIVAN) tablet 2 mg, 2 mg, Oral, TID PRN, Felipe Walker MD, 2 mg at 11/25/19 1027    metoprolol tartrate (LOPRESSOR) tablet 50 mg, 50 mg, Oral, Q12H [address], [facility], PA-C, 50 mg at 11/26/19 0932    ondansetron (ZOFRAN) injection 4 mg, 4 mg, Intravenous, Q8H PRN, Mitchell Pa PA-C, 4 mg at 11/24/19 1602    pravastatin (PRAVACHOL) tablet 80 mg, 80 mg, Oral, Daily, Felipe Walker MD, 80 mg at 11/26/19 7516    predniSONE tablet 5 mg, 5 mg, Oral, Daily, Felipe Walker MD, 5 mg at 11/26/19 9516    sertraline (ZOLOFT) tablet 200 mg, 200 mg, Oral, Daily, Felipe Walker MD, 200 mg at 11/26/19 5783    sevelamer (RENAGEL) tablet 800 mg, 800 mg, Oral, TID With Meals, Felipe Walker MD, 800 mg at 11/26/19 6569    sodium bicarbonate tablet 1,300 mg, 1,300 mg, Oral, TID, Felipe Walker MD, 1,300 mg at 11/26/19 0928    tacrolimus (PROGRAF) capsule 2 mg, 2 mg, Oral, HS, Carlos Alejo MD, 2 mg at 11/25/19 2154    tacrolimus (PROGRAF) capsule 3 mg, 3 mg, Oral, Daily, Carlos Alejo MD, 3 mg at 11/26/19 0064    Laboratory Results:  Results from last 7 days   Lab Units 11/26/19  0811 11/25/19  0459 11/24/19  0511 11/23/19  1646   WBC Thousand/uL 5 73 5 39 6 48 6 70   HEMOGLOBIN g/dL 8 0* 6 6* 7 1* 7 1*   HEMATOCRIT % 26 7* 22 1* 23 8* 23 3*   PLATELETS Thousands/uL 153 145* 147* 149   POTASSIUM mmol/L 4 3 4 4 4 3 4 1   CHLORIDE mmol/L 111* 110* 111* 109*   CO2 mmol/L 18* 18* 17* 18*   BUN mg/dL 46* 45* 42* 44*   CREATININE mg/dL 3 20* 3 15* 3 05* 3 20*   CALCIUM mg/dL 8 1* 7 7* 7 9* 8 3

## 2019-11-26 NOTE — ASSESSMENT & PLAN NOTE
Lab Results   Component Value Date    HGBA1C 5 1 09/09/2019       Recent Labs     11/25/19  1054 11/25/19  1606 11/25/19  2042 11/26/19  0728   POCGLU 108 145* 130 101       Blood Sugar Average: Last 72 hrs:  (P) 112 [id number]   Lantus 5 units     Blood sugars remained stable at current regimen

## 2019-11-26 NOTE — ASSESSMENT & PLAN NOTE
Blood pressure was elevated  181/74, 183/76 and 172/68  With increase in hydralazine to 75 mg t i d  Blood pressure now trending down to 156/40  Anxiety might be playing a role- she is anxious over possibility of being a dialysis candidate  Plan:  · Continue Norvasc 10 mg daily  · Continue clonidine 0 3 mg t i d   · Continue doxazosin 4 mg daily  · Increased hydralazine to 75 mg t i d   · Start hydralazine 5 mg injection p r n for SBP more than 160    · Appreciate Nephrology recommendations

## 2019-11-26 NOTE — PROGRESS NOTES
Progress Note - Christine Yariel 1965, 53 y o  female MRN: [id number]    Unit/Bed#: S -01 Encounter: [id number]    Primary Care Provider: [employer], MD   Date and time admitted to hospital: 11/23/2019  2:47 PM        * Recurrent UTI  Assessment & Plan    Systemically well  CT scan showing stranding  Urine culture growing enterococcus fecium  UTI secondary to bladder drainage of the exocrine pancreas  Not due to [place]  - continue cefepime for now  History of exposure to 3rd generation cephalosporin recently    Plan:  · Appreciate ID consult   · Continue cefepime   · C/w immunosuppressives for renal transplant in 1998   · Urine culture:  Enterococcus fecium    Essential hypertension  Assessment & Plan  Blood pressure was elevated  181/74, 183/76 and 172/68  With increase in hydralazine to 75 mg t i d  Blood pressure now trending down to 156/40  Anxiety might be playing a role- she is anxious over possibility of being a dialysis candidate  Plan:  · Continue Norvasc 10 mg daily  · Continue clonidine 0 3 mg t i d   · Continue doxazosin 4 mg daily  · Increased hydralazine to 75 mg t i d   · Start hydralazine 5 mg injection p r n for SBP more than 160  · Appreciate Nephrology recommendations      Multiple myeloma not having achieved remission [facility])  Assessment & Plan  Patient with past medical history of multiple myeloma not having achieved remission  · On Revlimid    Renal transplant recipient  Assessment & Plan  Renal and pancreatic transplant recipient in 1998  Plan:  · Continue tacrolimus 3 mg a m  And 2 mg p m     Tacrolimus trough level pending  Goal is 3-4 and prednisone 5 mg daily  · Monitor creatinine which is currently at 3 point 1   Baseline creatinine of 2 5    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 [facility])  Assessment & Plan  Lab Results   Component Value Date    HGBA1C 5 1 09/09/2019       Recent Labs     11/25/19  1054 11/25/19  1606 11/25/19  2042 11/26/19  0728   POCGLU 108 145* 130 101       Blood Sugar Average: Last 72 hrs:  (P) 112 [id number]   Lantus 5 units  Blood sugars remained stable at current regimen    Chronic kidney disease, stage 4 (severe) ([facility] [address] )  Assessment & Plan  Patient with past medical history of renal transplant in 1998  Patient's baseline creatinine is 2 4-2 8  On admission patient's creatinine is at 3 2  Plan  · IVF  Appreciate nephrology input  · Monitor creatinine levels  Also with acidosis secondary to chronic bicarb loss  Since pancreatic exocrine secretions her drained into the bladder  Continue with bicarb  repletion    Anemia  Assessment & Plan  Patient with chronic kidney disease and as a result of chronic anemia  Hemoglobin 7 3 at baseline  · 11/25 patient's hemoglobin decreased to 6 6  Patient received 1 unit of PRBC and hemoglobin currently is around 8  Plan:  · Monitor hemoglobin  in a m  · Continue oral ferrous sulfate  ·     Obesity with BMI of 36 67  Lifestyle modification and weight reduction recommended  Subjective:  Patient feels well  Received 1 unit of PRBC yesterday for hemoglobin of 6  Hemoglobin currently has improved to 8  Creatinine remains at 3 2 with chronic metabolic acidosis secondary to chronic bicarb loss from exocrine secretion of the pancreas draining into the bladder  Urine has grown enterococcus fecium  Continues on cefepime  Blood pressure is now trending down to 156/40 after increase in hydralazine to 75 mg t i d  Physical Exam:   Vitals: Blood pressure 160/52, pulse 57, temperature 98 °F (36 7 °C), temperature source Oral, resp  rate 18, weight 109 kg (241 lb 2 9 oz), SpO2 96 %  ,Body mass index is 36 67 kg/m²  Gen:  Pleasant, non-tachypnic, non-dyspnic  Conversant  Heart: regular rate and rhythm, S1S2 present, no murmur, rub or gallop  Lungs: clear to ausculatation bilaterally  No wheezing, crackless, or rhonchi   No accessory muscle use or respiratory distress  Abd: soft, non-tender, non-distended  NABS, no guarding, rebound or peritoneal signs  Extremities: no clubbing, cyanosis or edema  2+pedal pulses bilaterally  Full range of motion  Neuro: awake, alert and oriented  Cranial nerves 2-12 intact  Strength and sensation grossly intact  Skin: warm and dry: no petechiae, purpura and rash      LABS:   Results from last 7 days   Lab Units 11/26/19  0811 11/25/19  0459 11/24/19  0511   WBC Thousand/uL 5 73 5 39 6 48   HEMOGLOBIN g/dL 8 0* 6 6* 7 1*   HEMATOCRIT % 26 7* 22 1* 23 8*   PLATELETS Thousands/uL 153 145* 147*     Results from last 7 days   Lab Units 11/26/19  0811 11/25/19  0459 11/24/19  0511   POTASSIUM mmol/L 4 3 4 4 4 3   CHLORIDE mmol/L 111* 110* 111*   CO2 mmol/L 18* 18* 17*   BUN mg/dL 46* 45* 42*   CREATININE mg/dL 3 20* 3 15* 3 05*   CALCIUM mg/dL 8 1* 7 7* 7 9*       Intake/Output Summary (Last 24 hours) at 11/26/2019 0948  Last data filed at 11/26/2019 0357  Gross per 24 hour   Intake 942 92 ml   Output 1250 ml   Net -307 08 ml           Current Facility-Administered Medications:  acetaminophen 650 mg Oral Q6H PRN Javy Parsons PA-C    amLODIPine 10 mg Oral QAM Gloria Bower MD    ARIPiprazole 20 mg Oral HS Kanwal Constantino MD    aspirin 81 mg Oral Daily Kanwal Constantino MD    busPIRone 5 mg Oral BID Kanwal Constantino MD    cefepime 1,000 mg Intravenous Q24H Lisa Becerra MD Last Rate: 1,000 mg (11/25/19 1134)   cholecalciferol 1,000 Units Oral TID Kanwal Constantino MD    cloNIDine 0 3 mg Oral Q8H [address] Kanwal Constantino MD    doxazosin 4 mg Oral HS Belia Marquez PA-C    DULoxetine 60 mg Oral BID Kanwal Constantino MD    ferrous sulfate 325 mg Oral Daily With Breakfast Kanwal Constantino MD    folic acid 8,280 mcg Oral Daily Kanwal Constantino MD    heparin (porcine) 5,000 Units Subcutaneous Q8H [address] Kanwal Constantino MD    hydrALAZINE 5 mg Intravenous Q6H PRN Colton Wiseman MD    hydrALAZINE 75 mg Oral TID [facility] JOHN Roland    insulin glargine 5 Units Subcutaneous HS Etienne Gibson, MD    insulin lispro 1-6 Units Subcutaneous TID AC Javy Parsons PA-C    insulin lispro 1-6 Units Subcutaneous HS Javy Frederick PA-C    levothyroxine 125 mcg Oral Early Morning Etienne Gibson, MD    LORazepam 2 mg Oral TID PRN Etienne Gibson MD    metoprolol tartrate 50 mg Oral Q12H [address] [place], JOHN    ondansetron 4 mg Intravenous Q8H PRN Jaye Sue PA-C    pravastatin 80 mg Oral Daily Etienne Gibson MD    predniSONE 5 mg Oral Daily Etienne Gibson, MD    sertraline 200 mg Oral Daily Etienne Gibson MD    sevelamer 800 mg Oral TID With Meals Etienne Gibson MD    sodium bicarbonate 1,300 mg Oral TID Etienne Gibson MD    tacrolimus 2 mg Oral HS Gavin Acevedo MD    tacrolimus 3 mg Oral Daily Gavin Acevedo MD        Family update- offered to update family-patient declined

## 2019-11-26 NOTE — ASSESSMENT & PLAN NOTE
Patient with past medical history of multiple myeloma not having achieved remission    · On Revlimid

## 2019-11-26 NOTE — ASSESSMENT & PLAN NOTE
Patient with chronic kidney disease and as a result of chronic anemia  Hemoglobin 7 3 at baseline  · 11/25 patient's hemoglobin decreased to 6 6  Patient received 1 unit of PRBC and hemoglobin currently is around 8  Plan:  · Monitor hemoglobin  in a m    · Continue oral ferrous sulfate  ·

## 2019-11-26 NOTE — ASSESSMENT & PLAN NOTE
Renal and pancreatic transplant recipient in 1998  Plan:  · Continue tacrolimus and prednisone  · Monitor creatinine which is currently at 3 point 1   Baseline creatinine of 2 5

## 2019-11-26 NOTE — PLAN OF CARE
Problem: Potential for Falls  Goal: Patient will remain free of falls  Description  INTERVENTIONS:  - Assess patient frequently for physical needs  -  Identify cognitive and physical deficits and behaviors that affect risk of falls    -  [place] fall precautions as indicated by assessment   - Educate patient/family on patient safety including physical limitations  - Instruct patient to call for assistance with activity based on assessment  - Modify environment to reduce risk of injury  - Consider OT/PT consult to assist with strengthening/mobility  Outcome: Progressing     Problem: PAIN - ADULT  Goal: Verbalizes/displays adequate comfort level or baseline comfort level  Description  Interventions:  - Encourage patient to monitor pain and request assistance  - Assess pain using appropriate pain scale  - Administer analgesics based on type and severity of pain and evaluate response  - Implement non-pharmacological measures as appropriate and evaluate response  - Consider cultural and social influences on pain and pain management  - Notify physician/advanced practitioner if interventions unsuccessful or patient reports new pain  Outcome: Progressing     Problem: INFECTION - ADULT  Goal: Absence or prevention of progression during hospitalization  Description  INTERVENTIONS:  - Assess and monitor for signs and symptoms of infection  - Monitor lab/diagnostic results  - Monitor all insertion sites, i e  indwelling lines, tubes, and drains  - Monitor endotracheal if appropriate and nasal secretions for changes in amount and color  - [place] appropriate cooling/warming therapies per order  - Administer medications as ordered  - Instruct and encourage patient and family to use good hand hygiene technique  - Identify and instruct in appropriate isolation precautions for identified infection/condition  Outcome: Progressing     Problem: GENITOURINARY - ADULT  Goal: Maintains or returns to baseline urinary function  Description  INTERVENTIONS:  - Assess urinary function  - Encourage oral fluids to ensure adequate hydration if ordered  - Administer IV fluids as ordered to ensure adequate hydration  - Administer ordered medications as needed  - Offer frequent toileting  - Follow urinary retention protocol if ordered  Outcome: Progressing  Goal: Absence of urinary retention  Description  INTERVENTIONS:  - Assess patients ability to void and empty bladder  - Monitor I/O  - Bladder scan as needed  - Discuss with physician/AP medications to alleviate retention as needed  - Discuss catheterization for long term situations as appropriate  Outcome: Progressing

## 2019-11-26 NOTE — PROGRESS NOTES
Progress Note - Infectious Disease   Patrick Spain 53 y o  female MRN: [id number]  Unit/Bed#: S -01 Encounter: [id number]      Impression/Plan:  1   Recurrent UTI   In the setting of # 2/3  [place] ongoing anatomic problem   Consider antibiotic failure as prior urine culture negative and antibiotic treatment was empiric   Reported high fever and confusion at home   No sepsis criteria here   Difficult to ascertain true infection versus colonization in this patient with complicated urological history, but some of features of presentation do suggest active infection   History of relatively susceptible E coli in the past   Now urine cx growing >100,000 colonies E  Faecium   Rec:  ? Discontinue cefepime   ? Start Daptomycin 500 mg IV q 48 hours pending final urine culture  ? Follow up urine culture  ? If urine cx sensitive to Linezolid, anticipate transition to PO Zyvox 600 mg PO q 12 hours through 12/2/19 upon discharge  ? Follow temperatures closely  ? Supportive care as per the primary service     2   Recent urinary retention   Status post recent Moreno, removed on Friday   Bladder scan in the ED normal   Rec:  ? Urinary retention protocol     3   History of renal/pancreatic transplant   1998  Question of pancreas draining into the bladder?  On chronic immunosuppression  Rec:  ? Continue home immunosuppression regimen     4   EDUARDO on CKD   Likely multifactorial   Baseline around 2 5  Est GFR 16%  Rec:  ? Follow creatinine closely and dose-adjust antibiotics as indicated  ? Recheck BMP in AM  ? Renal follow-up ongoing  ? No renal adjustment required if transitioned to PO Zyvox     5   MM   Recently started on Revlimid      The above impression and plan was discussed in detail with patient and Dr Noah Bruno      I spent 40 minutes on unit floor, with 25 minutes required in counseling and coordinating of antibiotic change      Antibiotics:  Daptomycin day 1     Subjective:  Patient has no fever, chills, sweats; no nausea, vomiting, diarrhea; no cough, shortness of breath; Some dysuria yet and unchanged awareness of transplant kidney in LLQ  No new symptoms  Objective:  Vitals:  Temp:  [97 3 °F (36 3 °C)-98 °F (36 7 °C)] 98 °F (36 7 °C)  HR:  [55-60] 57  Resp:  [16-18] 18  BP: (152-187)/(40-79) 160/52  SpO2:  [90 %-96 %] 96 %  Temp (24hrs), Av 7 °F (36 5 °C), Min:97 3 °F (36 3 °C), Max:98 °F (36 7 °C)  Current: Temperature: 98 °F (36 7 °C)    Physical Exam:   General Appearance:  Alert, interactive, nontoxic, no acute distress  Sitting at bedside s/p breakfast    Throat: Oropharynx moist without lesions  Lungs:   Clear to auscultation bilaterally; no wheezes, rhonchi or rales; respirations unlabored   Heart:  RRR; no murmur   Abdomen:   Soft, no focal tenderness in LLQ over transplant kidney, protuberant, positive bowel sounds  : No hinojosa, no SPT, no CVAT   Extremities: No clubbing or cyanosis, no edema, arm IV sites nontender     Skin: No new rashes or lesion     Labs, Imaging, & Other studies:   All pertinent labs and imaging studies were personally reviewed  Results from last 7 days   Lab Units 19  0811 19  0459 19  0511   WBC Thousand/uL 5 73 5 39 6 48   HEMOGLOBIN g/dL 8 0* 6 6* 7 1*   PLATELETS Thousands/uL 153 145* 147*     Results from last 7 days   Lab Units 19  0811 19  0459 19  0511 19  1646   POTASSIUM mmol/L 4 3 4 4 4 3 4 1   CHLORIDE mmol/L 111* 110* 111* 109*   CO2 mmol/L 18* 18* 17* 18*   BUN mg/dL 46* 45* 42* 44*   CREATININE mg/dL 3 20* 3 15* 3 05* 3 20*   EGFR ml/min/1 73sq m 16 16 17 16   CALCIUM mg/dL 8 1* 7 7* 7 9* 8 3   AST U/L  --  12 14 16   ALT U/L  --  9* 17 25   ALK PHOS U/L  --  93 104 104         Results from last 7 days   Lab Units 19  1508   URINE CULTURE  >100,000 cfu/ml Enterococcus faecium*

## 2019-11-26 NOTE — ASSESSMENT & PLAN NOTE
Patient with past medical history of renal transplant in 1998  Patient's baseline creatinine is 2 4-2 8  On admission patient's creatinine is at 3 2   · 11/25 Cr 3 15    Plan  · IVF  Appreciate nephrology input  · Monitor creatinine levels  Also with acidosis secondary to chronic bicarb loss  Since pancreatic exocrine secretions her drained into the bladder    Continue with bicarb  repletion

## 2019-11-26 NOTE — ASSESSMENT & PLAN NOTE
Systemically well  CT scan showing stranding  Urine culture growing enterococcus fecium  UTI secondary to bladder drainage of the exocrine pancreas  Not due to Moreno  - continue cefepime for now    History of exposure to 3rd generation cephalosporin recently    Plan:  · Appreciate ID consult   · Continue cefepime   · C/w immunosuppressives for renal transplant in 1998   · Urine culture:  Enterococcus fecium

## 2019-11-27 ENCOUNTER — TELEPHONE (OUTPATIENT)
Dept: HEMATOLOGY ONCOLOGY | Facility: CLINIC | Age: 54
End: 2019-11-27

## 2019-11-27 LAB
ANION GAP SERPL CALCULATED.3IONS-SCNC: 13 MMOL/L (ref 4–13)
BUN SERPL-MCNC: 44 MG/DL (ref 5–25)
CALCIUM SERPL-MCNC: 8.1 MG/DL (ref 8.3–10.1)
CHLORIDE SERPL-SCNC: 111 MMOL/L (ref 100–108)
CO2 SERPL-SCNC: 18 MMOL/L (ref 21–32)
CREAT SERPL-MCNC: 3.03 MG/DL (ref 0.6–1.3)
GFR SERPL CREATININE-BSD FRML MDRD: 17 ML/MIN/1.73SQ M
GLUCOSE SERPL-MCNC: 111 MG/DL (ref 65–140)
GLUCOSE SERPL-MCNC: 118 MG/DL (ref 65–140)
GLUCOSE SERPL-MCNC: 148 MG/DL (ref 65–140)
GLUCOSE SERPL-MCNC: 91 MG/DL (ref 65–140)
GLUCOSE SERPL-MCNC: 97 MG/DL (ref 65–140)
HCT VFR BLD AUTO: 25.1 % (ref 34.8–46.1)
HGB BLD-MCNC: 7.8 G/DL (ref 11.5–15.4)
PLATELET # BLD AUTO: 145 THOUSANDS/UL (ref 149–390)
PMV BLD AUTO: 12.9 FL (ref 8.9–12.7)
POTASSIUM SERPL-SCNC: 4 MMOL/L (ref 3.5–5.3)
SODIUM SERPL-SCNC: 142 MMOL/L (ref 136–145)
TACROLIMUS BLD-MCNC: 3.3 NG/ML (ref 2–20)

## 2019-11-27 PROCEDURE — 82948 REAGENT STRIP/BLOOD GLUCOSE: CPT

## 2019-11-27 PROCEDURE — 85014 HEMATOCRIT: CPT | Performed by: HOSPITALIST

## 2019-11-27 PROCEDURE — 99232 SBSQ HOSP IP/OBS MODERATE 35: CPT | Performed by: INTERNAL MEDICINE

## 2019-11-27 PROCEDURE — 99232 SBSQ HOSP IP/OBS MODERATE 35: CPT | Performed by: HOSPITALIST

## 2019-11-27 PROCEDURE — 80048 BASIC METABOLIC PNL TOTAL CA: CPT | Performed by: HOSPITALIST

## 2019-11-27 PROCEDURE — 85018 HEMOGLOBIN: CPT | Performed by: HOSPITALIST

## 2019-11-27 PROCEDURE — 85049 AUTOMATED PLATELET COUNT: CPT | Performed by: PSYCHIATRY & NEUROLOGY

## 2019-11-27 RX ORDER — HYDRALAZINE HYDROCHLORIDE 25 MG/1
100 TABLET, FILM COATED ORAL 3 TIMES DAILY
Status: DISCONTINUED | OUTPATIENT
Start: 2019-11-27 | End: 2019-12-06 | Stop reason: HOSPADM

## 2019-11-27 RX ORDER — HYDRALAZINE HYDROCHLORIDE 25 MG/1
25 TABLET, FILM COATED ORAL ONCE
Status: COMPLETED | OUTPATIENT
Start: 2019-11-27 | End: 2019-11-27

## 2019-11-27 RX ADMIN — SODIUM BICARBONATE 650 MG TABLET 1300 MG: at 17:09

## 2019-11-27 RX ADMIN — SODIUM BICARBONATE 650 MG TABLET 1300 MG: at 21:59

## 2019-11-27 RX ADMIN — LORAZEPAM 2 MG: 1 TABLET ORAL at 10:00

## 2019-11-27 RX ADMIN — DOXAZOSIN 4 MG: 4 TABLET ORAL at 21:58

## 2019-11-27 RX ADMIN — HYDRALAZINE HYDROCHLORIDE 100 MG: 25 TABLET ORAL at 21:59

## 2019-11-27 RX ADMIN — LORAZEPAM 2 MG: 1 TABLET ORAL at 17:13

## 2019-11-27 RX ADMIN — HYDRALAZINE HYDROCHLORIDE 100 MG: 25 TABLET ORAL at 17:09

## 2019-11-27 RX ADMIN — DULOXETINE HYDROCHLORIDE 60 MG: 60 CAPSULE, DELAYED RELEASE ORAL at 17:09

## 2019-11-27 RX ADMIN — SEVELAMER HYDROCHLORIDE 800 MG: 800 TABLET, FILM COATED PARENTERAL at 17:10

## 2019-11-27 RX ADMIN — CHOLECALCIFEROL TAB 25 MCG (1000 UNIT) 1000 UNITS: 25 TAB at 17:09

## 2019-11-27 RX ADMIN — DULOXETINE HYDROCHLORIDE 60 MG: 60 CAPSULE, DELAYED RELEASE ORAL at 08:15

## 2019-11-27 RX ADMIN — METOPROLOL TARTRATE 50 MG: 50 TABLET, FILM COATED ORAL at 21:59

## 2019-11-27 RX ADMIN — HEPARIN SODIUM 5000 UNITS: 5000 INJECTION INTRAVENOUS; SUBCUTANEOUS at 21:59

## 2019-11-27 RX ADMIN — CLONIDINE HYDROCHLORIDE 0.3 MG: 0.1 TABLET ORAL at 13:16

## 2019-11-27 RX ADMIN — INSULIN GLARGINE 5 UNITS: 100 INJECTION, SOLUTION SUBCUTANEOUS at 21:59

## 2019-11-27 RX ADMIN — SEVELAMER HYDROCHLORIDE 800 MG: 800 TABLET, FILM COATED PARENTERAL at 13:18

## 2019-11-27 RX ADMIN — FERROUS SULFATE TAB 325 MG (65 MG ELEMENTAL FE) 325 MG: 325 (65 FE) TAB at 08:15

## 2019-11-27 RX ADMIN — HYDRALAZINE HYDROCHLORIDE 75 MG: 25 TABLET ORAL at 08:16

## 2019-11-27 RX ADMIN — BUSPIRONE HYDROCHLORIDE 5 MG: 5 TABLET ORAL at 17:13

## 2019-11-27 RX ADMIN — CLONIDINE HYDROCHLORIDE 0.3 MG: 0.1 TABLET ORAL at 21:58

## 2019-11-27 RX ADMIN — SERTRALINE HYDROCHLORIDE 200 MG: 100 TABLET ORAL at 08:15

## 2019-11-27 RX ADMIN — CLONIDINE HYDROCHLORIDE 0.3 MG: 0.1 TABLET ORAL at 05:53

## 2019-11-27 RX ADMIN — PREDNISONE 5 MG: 5 TABLET ORAL at 08:17

## 2019-11-27 RX ADMIN — HEPARIN SODIUM 5000 UNITS: 5000 INJECTION INTRAVENOUS; SUBCUTANEOUS at 05:54

## 2019-11-27 RX ADMIN — LEVOTHYROXINE SODIUM 125 MCG: 125 TABLET ORAL at 05:54

## 2019-11-27 RX ADMIN — SEVELAMER HYDROCHLORIDE 800 MG: 800 TABLET, FILM COATED PARENTERAL at 08:16

## 2019-11-27 RX ADMIN — METOPROLOL TARTRATE 50 MG: 50 TABLET, FILM COATED ORAL at 08:16

## 2019-11-27 RX ADMIN — HEPARIN SODIUM 5000 UNITS: 5000 INJECTION INTRAVENOUS; SUBCUTANEOUS at 13:16

## 2019-11-27 RX ADMIN — TACROLIMUS 2 MG: 1 CAPSULE ORAL at 21:58

## 2019-11-27 RX ADMIN — ARIPIPRAZOLE 20 MG: 5 TABLET ORAL at 21:58

## 2019-11-27 RX ADMIN — PRAVASTATIN SODIUM 80 MG: 80 TABLET ORAL at 08:15

## 2019-11-27 RX ADMIN — BUSPIRONE HYDROCHLORIDE 5 MG: 5 TABLET ORAL at 08:16

## 2019-11-27 RX ADMIN — CHOLECALCIFEROL TAB 25 MCG (1000 UNIT) 1000 UNITS: 25 TAB at 21:58

## 2019-11-27 RX ADMIN — CHOLECALCIFEROL TAB 25 MCG (1000 UNIT) 1000 UNITS: 25 TAB at 08:16

## 2019-11-27 RX ADMIN — SODIUM BICARBONATE 650 MG TABLET 1300 MG: at 08:16

## 2019-11-27 RX ADMIN — FOLIC ACID 1000 MCG: 1 TABLET ORAL at 08:16

## 2019-11-27 RX ADMIN — AMLODIPINE BESYLATE 10 MG: 10 TABLET ORAL at 08:15

## 2019-11-27 RX ADMIN — ASPIRIN 81 MG: 81 TABLET, COATED ORAL at 08:16

## 2019-11-27 RX ADMIN — HYDRALAZINE HYDROCHLORIDE 25 MG: 25 TABLET ORAL at 09:43

## 2019-11-27 RX ADMIN — TACROLIMUS 3 MG: 1 CAPSULE ORAL at 08:15

## 2019-11-27 NOTE — PROGRESS NOTES
Progress Note - Shelly Leggett 1965, 53 y o  female MRN: [id number]    Unit/Bed#: S -01 Encounter: [id number]    Primary Care Provider: Idris Bourgeois MD   Date and time admitted to hospital: 11/23/2019  2:47 PM        * Recurrent UTI  Assessment & Plan    Systemically well  Urine culture growing enterococcus fecium  Antibiotic changed to daptomycin based on sensitivity  Also sensitive to linezolid, nitrofurantoin  Would not be able to use nitrofurantoin on discharge secondary to CKD 4  UTI secondary to bladder drainage of the exocrine pancreas  Not due to Moreno  History of exposure to 3rd generation cephalosporin recently  Patient has been told by ID to take Keflex p r n  if she develops any symptoms of dysuria, change in color of urine or smell or development of fever  Plan:  · Appreciate ID consult   · Continue daptomycin  · C/w immunosuppressives for renal transplant in 1998   · Urine culture:  Enterococcus fecium    Essential hypertension  Assessment & Plan  Blood pressure was elevated  181/74, 183/76 and 172/68  With increase in hydralazine to 75 mg t i d  Blood pressure now trending down to between 150 and 160  Anxiety might be playing a role- she is anxious over possibility of being a dialysis candidate  Plan:  · Continue Norvasc 10 mg daily  · Continue clonidine 0 3 mg t i d   · Continue doxazosin 4 mg daily  · Increased hydralazine to 75 mg t i d   · Start hydralazine 5 mg injection p r n for SBP more than 160  · Appreciate Nephrology recommendations      Multiple myeloma not having achieved remission [facility])  Assessment & Plan  Patient with past medical history of multiple myeloma not having achieved remission  · On Revlimid  · Will follow up with Oncology upon discharge  Renal transplant recipient  Assessment & Plan  Renal and pancreatic transplant recipient in 1998   Stable    Plan:  · Continue tacrolimus and prednisone  · Monitor creatinine which is currently at 3 05-slight down trend from 3 2  Baseline creatinine of 2 5    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 [facility])  Assessment & Plan  Lab Results   Component Value Date    HGBA1C 5 1 09/09/2019       Recent Labs     11/26/19  1048 11/26/19  1533 11/26/19  2116 11/27/19  0742   POCGLU 109 127 119 97       Blood Sugar Average: Last 72 hrs:  (P) 115   Lantus 5 units  Blood sugars remained stable at current regimen    Chronic kidney disease, stage 4 (severe) ([facility] [address] )  Assessment & Plan  Patient with past medical history of renal transplant in 1998  Patient's baseline creatinine is 2 4-2 8  On admission patient's creatinine is at 3 2   · 11/25 Cr 3 15    Plan  · IVF  Appreciate nephrology input  · Monitor creatinine levels  Also with acidosis secondary to chronic bicarb loss  Since pancreatic exocrine secretions her drained into the bladder  Continue with bicarb  repletion    Anemia  Assessment & Plan  Patient with chronic kidney disease and as a result of chronic anemia  Hemoglobin 7 3 at baseline  · 11/25 patient's hemoglobin decreased to 6 6  Patient received 1 unit of PRBC and hemoglobin currently is around 7 8  Plan:  · Monitor hemoglobin    · Continue oral ferrous sulfate  ·     Ambulatory dysfunction  Assessment & Plan  Patient with generalized weakness and ambulatory dysfunction secondary to deconditioning from current condition  Will have PT and OT evaluate to see whether she is safe to be discharged home versus needing rehab  Subjective:    Patient states that she feels better today  Antibiotics were changed to daptomycin based on urine culture growing enterococcus fecium  Physical Exam:   Vitals: Blood pressure 167/57, pulse 56, temperature 97 5 °F (36 4 °C), temperature source Oral, resp  rate 18, weight 110 kg (241 lb 10 oz), SpO2 96 %  ,Body mass index is 36 74 kg/m²  Gen:  Pleasant, non-tachypnic, non-dyspnic  Conversant    Heart: regular rate and rhythm, S1S2 present, no murmur, rub or gallop  Lungs: clear to ausculatation bilaterally  No wheezing, crackless, or rhonchi  No accessory muscle use or respiratory distress  Abd: soft, non-tender, non-distended  NABS, no guarding, rebound or peritoneal signs  Extremities: no clubbing, cyanosis or edema  2+pedal pulses bilaterally  Full range of motion  Neuro: awake, alert and oriented  Cranial nerves 2-12 intact  Strength and sensation grossly intact  Skin: warm and dry: no petechiae, purpura and rash      LABS:   Results from last 7 days   Lab Units 11/27/19  0611 11/26/19  0811 11/25/19  0459 11/24/19  0511   WBC Thousand/uL  --  5 73 5 39 6 48   HEMOGLOBIN g/dL 7 8* 8 0* 6 6* 7 1*   HEMATOCRIT % 25 1* 26 7* 22 1* 23 8*   PLATELETS Thousands/uL 145* 153 145* 147*     Results from last 7 days   Lab Units 11/27/19  0454 11/26/19  0811 11/25/19  0459   POTASSIUM mmol/L 4 0 4 3 4 4   CHLORIDE mmol/L 111* 111* 110*   CO2 mmol/L 18* 18* 18*   BUN mg/dL 44* 46* 45*   CREATININE mg/dL 3 03* 3 20* 3 15*   CALCIUM mg/dL 8 1* 8 1* 7 7*       Intake/Output Summary (Last 24 hours) at 11/27/2019 0859  Last data filed at 11/26/2019 2218  Gross per 24 hour   Intake 420 ml   Output 975 ml   Net -555 ml           Current Facility-Administered Medications:  acetaminophen 650 mg Oral Q6H PRN Javy Parsons PA-C    amLODIPine 10 mg Oral QAM Marilee Vernon MD    ARIPiprazole 20 mg Oral HS Mason Ferguson MD    aspirin 81 mg Oral Daily Mason Ferguson MD    busPIRone 5 mg Oral BID Mason Ferguson MD    cholecalciferol 1,000 Units Oral TID Mason Ferguson MD    cloNIDine 0 3 mg Oral [facility] Mason Ferguson MD    DAPTOmycin 6 mg/kg (Adjusted) Intravenous Q48H Stiven Monterroso MD Last Rate: 500 mg (11/26/19 1144)   doxazosin 4 mg Oral HS Milana Roland PA-C    DULoxetine 60 mg Oral BID Mason Ferguson MD    ferrous sulfate 325 mg Oral Daily With Breakfast Mason Ferguson MD    folic acid 5,187 mcg Oral Daily Nilsa Garcia MD    heparin (porcine) 5,000 Units Subcutaneous [facility] Nilsa Garcia MD    hydrALAZINE 5 mg Intravenous Q6H PRN Van Tabares MD    hydrALAZINE 100 mg Oral TID [facility], PAFaraC    hydrALAZINE 25 mg Oral Once [place], [place]    insulin glargine 5 Units Subcutaneous HS Nilsa Garcia MD    insulin lispro 1-6 Units Subcutaneous TID  Javy Parsons, JOHN    insulin lispro 1-6 Units Subcutaneous HS Javy Elizabeth, PA-C    levothyroxine 125 mcg Oral Early Morning Nilsa Garcia MD    LORazepam 2 mg Oral TID PRN Nilsa Garcia MD    metoprolol tartrate 50 mg Oral Q12H [address] [facility], PA-C    ondansetron 4 mg Intravenous Q8H PRN GISEL PearceC    pravastatin 80 mg Oral Daily Nilsa Garcia MD    predniSONE 5 mg Oral Daily Nilsa Garcia MD    sertraline 200 mg Oral Daily Nilsa Garcia MD    sevelamer 800 mg Oral TID With Meals Nilsa Garcia MD    sodium bicarbonate 1,300 mg Oral TID Nilsa Garcia MD    tacrolimus 2 mg Oral HS Everett Xavier MD    tacrolimus 3 mg Oral Daily Everett Xavier MD        Family update- offered to update family-patient declined

## 2019-11-27 NOTE — ASSESSMENT & PLAN NOTE
Systemically well  Urine culture growing enterococcus fecium  Antibiotic changed to daptomycin based on sensitivity  Also sensitive to linezolid, nitrofurantoin  Would not be able to use nitrofurantoin on discharge secondary to CKD 4  UTI secondary to bladder drainage of the exocrine pancreas  Not due to Moreno  History of exposure to 3rd generation cephalosporin recently  Patient has been told by ID to take Keflex p r n  if she develops any symptoms of dysuria, change in color of urine or smell or development of fever      Plan:  · Appreciate ID consult   · Continue daptomycin  · C/w immunosuppressives for renal transplant in 1998   · Urine culture:  Enterococcus fecium

## 2019-11-27 NOTE — SOCIAL WORK
Per SLIM and ID request, CM sent Rx for specialty antibiotic Tedizolid to [facility]

## 2019-11-27 NOTE — PROGRESS NOTES
NEPHROLOGY PROGRESS NOTE   Tereso Rhame 53 y o  female MRN: [id number]  Unit/Bed#: S -01 Encounter: [id number]  Reason for Consult: EDUARDO/CKD    ASSESSMENT/PLAN:  1  Acute Kidney Injury, POA- secondary to prerenal azotemia with ATN from UTI/infection with anemia  - volume status appears euvolemic  - UA: dirty  - PVR: 85ml (11/23)  - creatinine slightly improved this AM on blood work  2  Chronic Kidney Disease stage IV- follows with Dr Juliette Harmon creatinine is 2 5   3  Status post kidney/pancreas transplant in 1998   4  Immunosuppressives- continue prednisone 5mg daily and tacrolimus 3mg AM & 2mg PM  - goal tacrolimus is 3-4  - repeat tac level pending  5  Hypertension- BP still elevated  - increase hydralazine again to 100mg TID 11/27  - low salt diet  - left subclavian stenosis  6  Low bicarbonate tablets- continue sodium bicarbonate tablets 1300mg TID  7  Hyperphosphatemia- continue renagel 800mg TID with meals  - check phos in AM  8  Anemia- received PRBC 11/25/19  - hgb improved  - iron stores acceptable from October 2019  9  Recurrent UTI- ID on board, on cefepime  10  Multiple Myeloma- hematology follow up    Disposition:  Creatinine improving slowly    SUBJECTIVE:  Patient feeling better  Denies SOB  Denies dysuria/burning with urination since starting new abx  Eating okay      OBJECTIVE:  Current Weight: Weight - Scale: 110 kg (241 lb 10 oz)  Vitals:    11/26/19 2205 11/27/19 0553 11/27/19 0600 11/27/19 0700   BP: 170/64 150/54  167/57   BP Location: Right arm   Left arm   Pulse: 67   56   Resp: 18   18   Temp: 98 3 °F (36 8 °C)   97 5 °F (36 4 °C)   TempSrc: Oral   Oral   SpO2: 98%   96%   Weight:   110 kg (241 lb 10 oz)        Intake/Output Summary (Last 24 hours) at 11/27/2019 0857  Last data filed at 11/26/2019 2218  Gross per 24 hour   Intake 420 ml   Output 975 ml   Net -555 ml     General: NAD  Skin: no rash  Eyes: anicteric  ENMT: mm moist  Neck: no masses  Respiratory: CTAB  Cardiac: RRR  Extremities: no edema  GI: soft nt nd  Neuro: alert awake  Psych: mood and affect appropriate    Medications:    Current Facility-Administered Medications:     acetaminophen (TYLENOL) tablet 650 mg, 650 mg, Oral, Q6H PRN, Javy Parsons PA-C, 650 mg at 11/24/19 1756    amLODIPine (NORVASC) tablet 10 mg, 10 mg, Oral, QAM, Joana Navarrete MD, 10 mg at 11/27/19 0815    ARIPiprazole (ABILIFY) tablet 20 mg, 20 mg, Oral, HS, Charla Jorgensen MD, 20 mg at 11/26/19 2205    aspirin (ECOTRIN LOW STRENGTH) EC tablet 81 mg, 81 mg, Oral, Daily, Charla Jorgensen MD, 81 mg at 11/27/19 0816    busPIRone (BUSPAR) tablet 5 mg, 5 mg, Oral, BID, Charla Jorgensen MD, 5 mg at 11/27/19 0816    cholecalciferol (VITAMIN D3) tablet 1,000 Units, 1,000 Units, Oral, TID, Charla Jorgensen MD, 1,000 Units at 11/27/19 0816    cloNIDine (CATAPRES) tablet 0 3 mg, 0 3 mg, Oral, Q8H [address], Charla Jorgensen MD, 0 3 mg at 11/27/19 0553    DAPTOmycin (CUBICIN) 500 mg in sodium chloride 0 9 % 50 mL IVPB, 6 mg/kg (Adjusted), Intravenous, Q48H, Stiven MD Loc, Last Rate: 100 mL/hr at 11/26/19 1144, 500 mg at 11/26/19 1144    doxazosin (CARDURA) tablet 4 mg, 4 mg, Oral, HS, [facility], JOHN, 4 mg at 11/26/19 2205    DULoxetine (CYMBALTA) delayed release capsule 60 mg, 60 mg, Oral, BID, Charla Jorgensen MD, 60 mg at 11/27/19 0815    ferrous sulfate tablet 325 mg, 325 mg, Oral, Daily With Breakfast, Charla Jorgensen MD, 325 mg at 94/77/66 9153    folic acid (FOLVITE) tablet 1,000 mcg, 1,000 mcg, Oral, Daily, Charla Jorgensen MD, 1,000 mcg at 11/27/19 0816    heparin (porcine) subcutaneous injection 5,000 Units, 5,000 Units, Subcutaneous, Q8H [address], 5,000 Units at 11/27/19 0554 **AND** [COMPLETED] Platelet count, , , Once, Charla Jorgensen MD    hydrALAZINE (APRESOLINE) injection 5 mg, 5 mg, Intravenous, Q6H PRN, Erika Swanson MD, 5 mg at 11/26/19 1317    hydrALAZINE (APRESOLINE) tablet 100 mg, 100 mg, Oral, TID, Shine Garcias    hydrALAZINE (APRESOLINE) tablet 25 mg, 25 mg, Oral, Once, JOHN Garcias    insulin glargine (LANTUS) subcutaneous injection 5 Units 0 05 mL, 5 Units, Subcutaneous, HS, Eleanor Rodriguez MD, 5 Units at 11/26/19 2206    insulin lispro (HumaLOG) 100 units/mL subcutaneous injection 1-6 Units, 1-6 Units, Subcutaneous, TID AC, 1 Units at 11/24/19 1710 **AND** Fingerstick Glucose (POCT), , , TID AC, Javy Parsons PA-C    insulin lispro (HumaLOG) 100 units/mL subcutaneous injection 1-6 Units, 1-6 Units, Subcutaneous, HS, Javy aPrsons PA-C, Stopped at 11/23/19 2147    levothyroxine tablet 125 mcg, 125 mcg, Oral, Early Morning, Eleanor Rodriguez MD, 125 mcg at 11/27/19 0554    LORazepam (ATIVAN) tablet 2 mg, 2 mg, Oral, TID PRN, Eleanor Rodriguez MD, 2 mg at 11/26/19 1522    metoprolol tartrate (LOPRESSOR) tablet 50 mg, 50 mg, Oral, Q12H [address], JOHN Garcias, 50 mg at 11/27/19 0816    ondansetron (ZOFRAN) injection 4 mg, 4 mg, Intravenous, Q8H PRN, Carolina Hewitt PA-C, 4 mg at 11/24/19 1602    pravastatin (PRAVACHOL) tablet 80 mg, 80 mg, Oral, Daily, Eleanor Rodriguez MD, 80 mg at 11/27/19 0815    predniSONE tablet 5 mg, 5 mg, Oral, Daily, Eleanor Rodriguez MD, 5 mg at 11/27/19 4369    sertraline (ZOLOFT) tablet 200 mg, 200 mg, Oral, Daily, Eleanor Rodriguez MD, 200 mg at 11/27/19 0815    sevelamer (RENAGEL) tablet 800 mg, 800 mg, Oral, TID With Meals, Eleanor Rodriguez MD, 800 mg at 11/27/19 0816    sodium bicarbonate tablet 1,300 mg, 1,300 mg, Oral, TID, Eleanor Rodriguez MD, 1,300 mg at 11/27/19 0816    tacrolimus (PROGRAF) capsule 2 mg, 2 mg, Oral, HS, Kusum Taveras MD, 2 mg at 11/26/19 2206    tacrolimus (PROGRAF) capsule 3 mg, 3 mg, Oral, Daily, Kusum Taveras MD, 3 mg at 11/27/19 0815    Laboratory Results:  Results from last 7 days   Lab Units 11/27/19  0611 11/27/19  0454 11/26/19  0811 11/25/19  0459 11/24/19  0511 11/23/19  1646   WBC Thousand/uL --   --  5 73 5 39 6 48 6 70   HEMOGLOBIN g/dL 7 8*  --  8 0* 6 6* 7 1* 7 1*   HEMATOCRIT % 25 1*  --  26 7* 22 1* 23 8* 23 3*   PLATELETS Thousands/uL 145*  --  153 145* 147* 149   POTASSIUM mmol/L  --  4 0 4 3 4 4 4 3 4 1   CHLORIDE mmol/L  --  111* 111* 110* 111* 109*   CO2 mmol/L  --  18* 18* 18* 17* 18*   BUN mg/dL  --  44* 46* 45* 42* 44*   CREATININE mg/dL  --  3 03* 3 20* 3 15* 3 05* 3 20*   CALCIUM mg/dL  --  8 1* 8 1* 7 7* 7 9* 8 3

## 2019-11-27 NOTE — PLAN OF CARE
Problem: Potential for Falls  Goal: Patient will remain free of falls  Description  INTERVENTIONS:  - Assess patient frequently for physical needs  -  Identify cognitive and physical deficits and behaviors that affect risk of falls    -  [place] fall precautions as indicated by assessment   - Educate patient/family on patient safety including physical limitations  - Instruct patient to call for assistance with activity based on assessment  - Modify environment to reduce risk of injury  - Consider OT/PT consult to assist with strengthening/mobility  Outcome: Progressing     Problem: PAIN - ADULT  Goal: Verbalizes/displays adequate comfort level or baseline comfort level  Description  Interventions:  - Encourage patient to monitor pain and request assistance  - Assess pain using appropriate pain scale  - Administer analgesics based on type and severity of pain and evaluate response  - Implement non-pharmacological measures as appropriate and evaluate response  - Consider cultural and social influences on pain and pain management  - Notify physician/advanced practitioner if interventions unsuccessful or patient reports new pain  Outcome: Progressing     Problem: INFECTION - ADULT  Goal: Absence or prevention of progression during hospitalization  Description  INTERVENTIONS:  - Assess and monitor for signs and symptoms of infection  - Monitor lab/diagnostic results  - Monitor all insertion sites, i e  indwelling lines, tubes, and drains  - Monitor endotracheal if appropriate and nasal secretions for changes in amount and color  - [place] appropriate cooling/warming therapies per order  - Administer medications as ordered  - Instruct and encourage patient and family to use good hand hygiene technique  - Identify and instruct in appropriate isolation precautions for identified infection/condition  Outcome: Progressing     Problem: GENITOURINARY - ADULT  Goal: Maintains or returns to baseline urinary function  Description  INTERVENTIONS:  - Assess urinary function  - Encourage oral fluids to ensure adequate hydration if ordered  - Administer IV fluids as ordered to ensure adequate hydration  - Administer ordered medications as needed  - Offer frequent toileting  - Follow urinary retention protocol if ordered  Outcome: Progressing  Goal: Absence of urinary retention  Description  INTERVENTIONS:  - Assess patients ability to void and empty bladder  - Monitor I/O  - Bladder scan as needed  - Discuss with physician/AP medications to alleviate retention as needed  - Discuss catheterization for long term situations as appropriate  Outcome: Progressing

## 2019-11-27 NOTE — SOCIAL WORK
LOS: 4  Readmission: YES  Bundle: No    Consult(s): None at this time  Met with pt to introduce Cm services and complete assessment  Pt had her father/POA present and verbalized agreement with personal interview with him present  Pt reports she was seen by Hero Baird RN at home and due to elevated blood pressure and fever she was instructed by RN to return to hospital  Pt reports she knows who to contact with questions/problems, is able to get all her Rx's filled at her preferred pharmacy without issue, takes all Rx's as prescribed, and has no issue getting to f/u appts  Pt reports emergency contacts are as follows and verbalized agreement with staff calling them as needed:  1  Father/POA Mary Marr [phone]  2  Mother Negrita Pinzon [phone]    Pt reported the following:   Lives with/where: alone in her own [employer] apartment   House/apt and JESSI: 1 story apartment with 3 JESSI; reports no issue with steps   Bedroom/bathroom layout: all on same floor   Independent PTA: YES, denies need for any assistance with ADLs   Support and help at home from: identifies her parents as supportive   DME at home: YES, has RW, BSC, shower chair, and cane from [facility]   Current or hx of VNA: YES - currently with [facility]NA for SN and PT and wishes to continue with those services after d/c  [facility] referral was already sent to accepted by Hero Baird  CM sent updated message via [facility] to Hero Baird notifying them that pt will be in hospital until atleast Friday 11/29   Current or hx of PT/rehab: YES, pt reports hx at [facility] but states she does not want any form of IP rehab and only wants to d/c home   Transportation: She does have a car and drives, and family members also drive  Her father will transport upon d/c    Mental health: Is currently in OP tx for depression and anxiety with psychiatrist Dr Remy Ramirez who she sees monthly  Denies hx IP tx and has no current counseling services     Substance use: Denies   PCP: Mike Gross [phone]   Work/school/retired: She is disabled and collects disability payments   Insurance: Reports she has primary Medicare, secondary [employer], and additional [employer] plan for prescriptions   Rx benefits: Has Rx coverage and reports usually all meds are covered and she can afford them without issue   Preferred pharmacy: [facility] but is agreeable to [facility] meds to beds for specialty Rx   POA/LW: identifies her father Matias  as POA/health care agent  They state they do have formal paperwork and are aware it is not in her record  CM encouraged them to provide copy for chart  Pt's [employer] Rx plan is not listed on facesheet so CM sent copy of card to [facility] to process with Rx Tedizolid  Per Isidoro Ferguson at [address] Rx plan does not cover the Rx at all  CM notified pt and her father of same  Copy of card forwarded to insurance verification group via secure email and copy sent to medical records  CM reviewed discharge planning process including the following: identifying caregivers at home, preference for d/c planning needs, availability of [facility] program, availability of treatment team to discuss questions or concerns patient and/or family may have regarding diagnosis, plan of care, old or new medications and discharge planning  CM will continue to follow for care coordination and update assessment as appropriate  CM dept to f/u with pharmacy and  patient assistance program on Friday due to high cost of Rx necessary for d/c

## 2019-11-27 NOTE — ASSESSMENT & PLAN NOTE
Patient with generalized weakness and ambulatory dysfunction secondary to deconditioning from current condition  Will have PT and OT evaluate to see whether she is safe to be discharged home versus needing rehab

## 2019-11-27 NOTE — ASSESSMENT & PLAN NOTE
Patient with chronic kidney disease and as a result of chronic anemia  Hemoglobin 7 3 at baseline  · 11/25 patient's hemoglobin decreased to 6 6  Patient received 1 unit of PRBC and hemoglobin currently is around 7 8  Plan:  · Monitor hemoglobin      · Continue oral ferrous sulfate  ·

## 2019-11-27 NOTE — TELEPHONE ENCOUNTER
Called the pharmacy and instructed them the patient is inpatient and the medication is on hold at this time

## 2019-11-27 NOTE — ASSESSMENT & PLAN NOTE
Blood pressure was elevated  181/74, 183/76 and 172/68  With increase in hydralazine to 75 mg t i d  Blood pressure now trending down to between 150 and 160  Anxiety might be playing a role- she is anxious over possibility of being a dialysis candidate  Plan:  · Continue Norvasc 10 mg daily  · Continue clonidine 0 3 mg t i d   · Continue doxazosin 4 mg daily  · Increased hydralazine to 75 mg t i d   · Start hydralazine 5 mg injection p r n for SBP more than 160    · Appreciate Nephrology recommendations

## 2019-11-27 NOTE — PROGRESS NOTES
Progress Note - Infectious Disease   Lela Gee 53 y o  female MRN: [id number]  Unit/Bed#: S -01 Encounter: [id number]      Impression/Plan:    69-year-old female patient with history of simultaneous kidney and pancreas transplant done in 1998 with a bladder drainage of the exocrine pancreas, currently admitted for dysuria and malodorous urine  Patient is currently being treated for recurrent UTI  Urine culture growing more than 100,000 colonies of enterococcus faecium, VRE     1- Recurrent UTI:  UA showing pyuria, urine culture positive for more than 100,000 colonies of Enterococcus faecium VRE  CT scan abdomen pelvis done 11/06/2019 and reviewed  - continue daptomycin IV while inpatient  - patient is on Zoloft and buspirone which makes linezolid contraindicated due to drug interaction  Patient will need to complete a course of 7 days of antibiotics through 12/02/2019  At discharge can switch to a Tedizolid 200 mg p o  Daily  (please check if Tedizolid is covered by the patient's insurance before discharge)     2- Nicho on CKD:  Creatinine today 3; baseline creatinine around 2 5 mg/dL  - nephrology follow-up, antibiotic dose adjusted according to creatinine clearance     3- History of kidney and pancreas transplant:  The endocrine function of 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   The exocrine secretions are excreted in the bladder, and this might be the reason for recurrent urinary tract infection based on available literature  - continue home immunosuppression regimen     4- MM:  Revlimid as per primary team and Hematology    Plan mentioned above discussed with patient  Case also discussed with primary team    Antibiotics:  Daptomycin day 2    Subjective:  Patient has no fever, chills, sweats; no nausea, vomiting, diarrhea; no cough, shortness of breath; no pain  No new symptoms    Patient reports that her dysuria resolved after she received daptomycin    Objective:  Vitals:  Temp:  [97 5 °F (36 4 °C)-98 3 °F (36 8 °C)] 97 5 °F (36 4 °C)  HR:  [56-67] 56  Resp:  [18] 18  BP: (150-190)/(50-68) 167/57  SpO2:  [94 %-98 %] 96 %  Temp (24hrs), Av °F (36 7 °C), Min:97 5 °F (36 4 °C), Max:98 3 °F (36 8 °C)  Current: Temperature: 97 5 °F (36 4 °C)    Physical Exam:   General Appearance:  Alert, interactive, nontoxic, no acute distress  Throat: Oropharynx moist without lesions  Lungs:   Clear to auscultation bilaterally; no wheezes, rhonchi or rales; respirations unlabored   Heart:  RRR; no murmur, rub or gallop   Abdomen:   Soft, non-tender, non-distended, positive bowel sounds  Extremities: No clubbing, cyanosis or edema   Skin: No new rashes or lesions  No draining wounds noted         Labs, Imaging, & Other studies:   All pertinent labs and imaging studies were personally reviewed  Results from last 7 days   Lab Units 19  0611 19  0811 19  0459 19  0511   WBC Thousand/uL  --  5 73 5 39 6 48   HEMOGLOBIN g/dL 7 8* 8 0* 6 6* 7 1*   PLATELETS Thousands/uL 145* 153 145* 147*     Results from last 7 days   Lab Units 19  0454 19  0811 19  0459 19  0511 19  1646   SODIUM mmol/L 142 144 142 143 140   POTASSIUM mmol/L 4 0 4 3 4 4 4 3 4 1   CHLORIDE mmol/L 111* 111* 110* 111* 109*   CO2 mmol/L 18* 18* 18* 17* 18*   BUN mg/dL 44* 46* 45* 42* 44*   CREATININE mg/dL 3 03* 3 20* 3 15* 3 05* 3 20*   EGFR ml/min/1 73sq m 17 16 16 17 16   CALCIUM mg/dL 8 1* 8 1* 7 7* 7 9* 8 3   AST U/L  --   --  12 14 16   ALT U/L  --   --  9* 17 25   ALK PHOS U/L  --   --  93 104 104     Results from last 7 days   Lab Units 19  1508   URINE CULTURE  >100,000 cfu/ml Vancomycin Resistant Enterococcus faecium*

## 2019-11-27 NOTE — ASSESSMENT & PLAN NOTE
Lab Results   Component Value Date    HGBA1C 5 1 09/09/2019       Recent Labs     11/26/19  1048 11/26/19  1533 11/26/19  2116 11/27/19  0742   POCGLU 109 127 119 97       Blood Sugar Average: Last 72 hrs:  (P) 115   Lantus 5 units     Blood sugars remained stable at current regimen

## 2019-11-27 NOTE — SOCIAL WORK
[facility], cost for 6 day supply/6 does of Tedizolid is $2,215 11 and cost for 3 day supply is $1,107 90  [place] reports it is an expensive specialty Rx that must be ordered and if ordered today, will be in [facility] by Friday 11/29 due to holiday tomorrow  CM reviewed  website [employer] for coupon and patient assistance options  CM called  rep dept [phone] and was told that dept is for doctor office samples, so CM must call [employer] patient assistance program [phone]  CM called [employer] pt assist # and automated message states program is closed for the holiday, CM must call back when they reopen after holiday  CM called [facility] [phone] and spoke with rep Serina Syed directed CM to website [employer]/ to check pt eligibility for coupon  Per [employer], pt does not qualify for coupon due to having Arcenio Allen CM notified ID Dr Ronel Robb and CM CC Claudene Roy of high Rx copay cost  Per ID, no other medication is an option for pt due to her complex medical history and other meds she is taking are contraindicated  Zelphia Salts reports she spoke with  S  José and Angelia Bailey Dr as inpt pharmacy currently has 6 doses of medication  Per Leo Sosa pharmacy is aware to not order Rx at this time and CM dept will f/u with pharmacy and [employer] Patient Assistance Program on Friday to determine best course of action to obtain Rx for pt

## 2019-11-27 NOTE — ASSESSMENT & PLAN NOTE
Renal and pancreatic transplant recipient in 1998  Stable    Plan:  · Continue tacrolimus and prednisone  · Monitor creatinine which is currently at 3 05-slight down trend from 3 2   Baseline creatinine of 2 5

## 2019-11-27 NOTE — TELEPHONE ENCOUNTER
Lv calling from [facility] in regards to pts Revlimid rx  They have been trying to contact pt but she has been in and out of the hospital  They also have not heard back from our office about a refill, is this on hold?  Please call pharmacy back at [phone]

## 2019-11-27 NOTE — ASSESSMENT & PLAN NOTE
Patient with past medical history of multiple myeloma not having achieved remission  · On Revlimid  · Will follow up with Oncology upon discharge

## 2019-11-28 LAB
ALBUMIN SERPL BCP-MCNC: 2.8 G/DL (ref 3.5–5)
ANION GAP SERPL CALCULATED.3IONS-SCNC: 13 MMOL/L (ref 4–13)
BACTERIA UR CULT: ABNORMAL
BUN SERPL-MCNC: 44 MG/DL (ref 5–25)
CALCIUM SERPL-MCNC: 8.8 MG/DL (ref 8.3–10.1)
CHLORIDE SERPL-SCNC: 110 MMOL/L (ref 100–108)
CO2 SERPL-SCNC: 18 MMOL/L (ref 21–32)
CREAT SERPL-MCNC: 2.81 MG/DL (ref 0.6–1.3)
GFR SERPL CREATININE-BSD FRML MDRD: 18 ML/MIN/1.73SQ M
GLUCOSE SERPL-MCNC: 109 MG/DL (ref 65–140)
GLUCOSE SERPL-MCNC: 111 MG/DL (ref 65–140)
GLUCOSE SERPL-MCNC: 113 MG/DL (ref 65–140)
GLUCOSE SERPL-MCNC: 125 MG/DL (ref 65–140)
GLUCOSE SERPL-MCNC: 126 MG/DL (ref 65–140)
PHOSPHATE SERPL-MCNC: 4.7 MG/DL (ref 2.7–4.5)
POTASSIUM SERPL-SCNC: 4 MMOL/L (ref 3.5–5.3)
SODIUM SERPL-SCNC: 141 MMOL/L (ref 136–145)

## 2019-11-28 PROCEDURE — 80069 RENAL FUNCTION PANEL: CPT | Performed by: PHYSICIAN ASSISTANT

## 2019-11-28 PROCEDURE — 99232 SBSQ HOSP IP/OBS MODERATE 35: CPT | Performed by: HOSPITALIST

## 2019-11-28 PROCEDURE — 99232 SBSQ HOSP IP/OBS MODERATE 35: CPT | Performed by: INTERNAL MEDICINE

## 2019-11-28 PROCEDURE — 82948 REAGENT STRIP/BLOOD GLUCOSE: CPT

## 2019-11-28 RX ADMIN — AMLODIPINE BESYLATE 10 MG: 10 TABLET ORAL at 08:01

## 2019-11-28 RX ADMIN — CLONIDINE HYDROCHLORIDE 0.3 MG: 0.1 TABLET ORAL at 13:54

## 2019-11-28 RX ADMIN — DAPTOMYCIN 500 MG: 500 INJECTION, POWDER, LYOPHILIZED, FOR SOLUTION INTRAVENOUS at 11:22

## 2019-11-28 RX ADMIN — BUSPIRONE HYDROCHLORIDE 5 MG: 5 TABLET ORAL at 17:00

## 2019-11-28 RX ADMIN — METOPROLOL TARTRATE 50 MG: 50 TABLET, FILM COATED ORAL at 08:02

## 2019-11-28 RX ADMIN — CHOLECALCIFEROL TAB 25 MCG (1000 UNIT) 1000 UNITS: 25 TAB at 08:01

## 2019-11-28 RX ADMIN — HYDRALAZINE HYDROCHLORIDE 100 MG: 25 TABLET ORAL at 21:36

## 2019-11-28 RX ADMIN — HEPARIN SODIUM 5000 UNITS: 5000 INJECTION INTRAVENOUS; SUBCUTANEOUS at 05:24

## 2019-11-28 RX ADMIN — HEPARIN SODIUM 5000 UNITS: 5000 INJECTION INTRAVENOUS; SUBCUTANEOUS at 21:37

## 2019-11-28 RX ADMIN — CLONIDINE HYDROCHLORIDE 0.3 MG: 0.1 TABLET ORAL at 05:24

## 2019-11-28 RX ADMIN — DULOXETINE HYDROCHLORIDE 60 MG: 60 CAPSULE, DELAYED RELEASE ORAL at 17:00

## 2019-11-28 RX ADMIN — TACROLIMUS 3 MG: 1 CAPSULE ORAL at 08:01

## 2019-11-28 RX ADMIN — SEVELAMER HYDROCHLORIDE 800 MG: 800 TABLET, FILM COATED PARENTERAL at 08:02

## 2019-11-28 RX ADMIN — INSULIN GLARGINE 5 UNITS: 100 INJECTION, SOLUTION SUBCUTANEOUS at 21:37

## 2019-11-28 RX ADMIN — PREDNISONE 5 MG: 5 TABLET ORAL at 08:01

## 2019-11-28 RX ADMIN — DULOXETINE HYDROCHLORIDE 60 MG: 60 CAPSULE, DELAYED RELEASE ORAL at 08:01

## 2019-11-28 RX ADMIN — CHOLECALCIFEROL TAB 25 MCG (1000 UNIT) 1000 UNITS: 25 TAB at 17:00

## 2019-11-28 RX ADMIN — METOPROLOL TARTRATE 50 MG: 50 TABLET, FILM COATED ORAL at 21:35

## 2019-11-28 RX ADMIN — SERTRALINE HYDROCHLORIDE 200 MG: 100 TABLET ORAL at 08:01

## 2019-11-28 RX ADMIN — FERROUS SULFATE TAB 325 MG (65 MG ELEMENTAL FE) 325 MG: 325 (65 FE) TAB at 08:01

## 2019-11-28 RX ADMIN — HYDRALAZINE HYDROCHLORIDE 100 MG: 25 TABLET ORAL at 17:00

## 2019-11-28 RX ADMIN — DOXAZOSIN 4 MG: 4 TABLET ORAL at 21:37

## 2019-11-28 RX ADMIN — HYDRALAZINE HYDROCHLORIDE 5 MG: 20 INJECTION INTRAMUSCULAR; INTRAVENOUS at 23:28

## 2019-11-28 RX ADMIN — LEVOTHYROXINE SODIUM 125 MCG: 125 TABLET ORAL at 05:24

## 2019-11-28 RX ADMIN — HYDRALAZINE HYDROCHLORIDE 100 MG: 25 TABLET ORAL at 08:01

## 2019-11-28 RX ADMIN — CHOLECALCIFEROL TAB 25 MCG (1000 UNIT) 1000 UNITS: 25 TAB at 21:36

## 2019-11-28 RX ADMIN — SODIUM BICARBONATE 650 MG TABLET 1300 MG: at 08:01

## 2019-11-28 RX ADMIN — SODIUM BICARBONATE 650 MG TABLET 1300 MG: at 17:00

## 2019-11-28 RX ADMIN — BUSPIRONE HYDROCHLORIDE 5 MG: 5 TABLET ORAL at 08:01

## 2019-11-28 RX ADMIN — SEVELAMER HYDROCHLORIDE 800 MG: 800 TABLET, FILM COATED PARENTERAL at 17:00

## 2019-11-28 RX ADMIN — SODIUM BICARBONATE 650 MG TABLET 1300 MG: at 21:37

## 2019-11-28 RX ADMIN — TACROLIMUS 2 MG: 1 CAPSULE ORAL at 21:36

## 2019-11-28 RX ADMIN — CLONIDINE HYDROCHLORIDE 0.3 MG: 0.1 TABLET ORAL at 21:35

## 2019-11-28 RX ADMIN — SEVELAMER HYDROCHLORIDE 800 MG: 800 TABLET, FILM COATED PARENTERAL at 11:30

## 2019-11-28 RX ADMIN — ONDANSETRON 4 MG: 2 INJECTION INTRAMUSCULAR; INTRAVENOUS at 21:31

## 2019-11-28 RX ADMIN — PRAVASTATIN SODIUM 80 MG: 80 TABLET ORAL at 08:01

## 2019-11-28 RX ADMIN — FOLIC ACID 1000 MCG: 1 TABLET ORAL at 08:01

## 2019-11-28 RX ADMIN — ASPIRIN 81 MG: 81 TABLET, COATED ORAL at 08:01

## 2019-11-28 RX ADMIN — ARIPIPRAZOLE 20 MG: 5 TABLET ORAL at 21:36

## 2019-11-28 RX ADMIN — HEPARIN SODIUM 5000 UNITS: 5000 INJECTION INTRAVENOUS; SUBCUTANEOUS at 13:54

## 2019-11-28 NOTE — ASSESSMENT & PLAN NOTE
Lab Results   Component Value Date    HGBA1C 5 1 09/09/2019       Recent Labs     11/27/19  1136 11/27/19  1636 11/27/19  2104 11/28/19  0656   POCGLU 111 148* 118 111       Blood Sugar Average: Last 72 hrs:  (P) 117 [id number]   Lantus 5 units     Blood sugars remained stable at current regimen

## 2019-11-28 NOTE — ASSESSMENT & PLAN NOTE
Patient with past medical history of renal transplant in 1998  Patient's baseline creatinine is 2 4-2 8  On admission patient's creatinine was 3 15 has now trended down to 2 81 today  ·     Plan  · IVF  Appreciate nephrology input  · Monitor creatinine levels  Also with acidosis secondary to chronic bicarb loss  Since pancreatic exocrine secretions her drained into the bladder    Continue with bicarb  repletion

## 2019-11-28 NOTE — PROGRESS NOTES
NEPHROLOGY PROGRESS NOTE    Santiago Najera 53 y o  female MRN: [id number]  Unit/Bed#: S -01 Encounter: [id number]  Reason for Consult:  Acute on chronic kidney disease    Patient is stable no acute complaints feeling better  Says she is urinating without difficulty  Otherwise no changes  ASSESSMENT/PLAN:  1  Renal    The patient developed acute renal failure in her renal allograft and at this point creatinine now has declined back to baseline at 2 8  The patient's Moreno catheter is out she seems to be voiding well  I mention this because last hospitalization she had some urine retention required Moreno catheter placement was actually learning to straight cath herself  She is on immunosuppression with tacrolimus and prednisone  Her outpatient nephrologist feels that her baseline trough level should be around 3-5 and trough level was in that range  No changes immunosuppressive dosage and follow  2  Urinary tract infection    Patient gets frequent and recurrent urinary tract infections  Had VRE  this admission and is being treated with daptomycin and there is a plan for oral antibiotic  Infectious Disease is following and will monitor and follow further recommendations  SUBJECTIVE:  Review of Systems   Constitution: Negative for chills, decreased appetite, fever and malaise/fatigue  HENT: Negative  Eyes: Negative  Cardiovascular: Negative  Negative for chest pain, dyspnea on exertion, orthopnea and palpitations  Respiratory: Negative  Negative for cough, shortness of breath, sputum production and wheezing  Gastrointestinal: Negative  Negative for bloating, abdominal pain, diarrhea, nausea and vomiting  Genitourinary: Negative  Negative for bladder incontinence, dysuria, hematuria and incomplete emptying  Neurological: Negative  Psychiatric/Behavioral: Negative          OBJECTIVE:  Current Weight: Weight - Scale: 110 kg (242 lb 15 2 oz)  Vitals:Temp (24hrs), Av 9 °F (36 6 °C), Min:97 4 °F (36 3 °C), Max:98 2 °F (36 8 °C)  Current: Temperature: (!) 97 4 °F (36 3 °C)   Blood pressure 154/58, pulse 58, temperature (!) 97 4 °F (36 3 °C), temperature source Oral, resp  rate 18, weight 110 kg (242 lb 15 2 oz), SpO2 96 %  , Body mass index is 36 94 kg/m²  Intake/Output Summary (Last 24 hours) at 11/28/2019 1106  Last data filed at 11/28/2019 0421  Gross per 24 hour   Intake 240 ml   Output 1200 ml   Net -960 ml       Physical Exam: /58 (BP Location: Left arm)   Pulse 58   Temp (!) 97 4 °F (36 3 °C) (Oral)   Resp 18   Wt 110 kg (242 lb 15 2 oz)   LMP  (LMP Unknown)   SpO2 96%   BMI 36 94 kg/m²   Physical Exam   Constitutional: She is oriented to person, place, and time  She appears well-nourished  No distress  HENT:   Head: Atraumatic  Eyes: No scleral icterus  Neck: Neck supple  No JVD present  Cardiovascular: Normal rate and regular rhythm  Exam reveals no gallop and no friction rub  Pulmonary/Chest: Effort normal and breath sounds normal  No respiratory distress  She has no wheezes  She has no rales  Abdominal: Soft  Bowel sounds are normal  She exhibits no distension  There is no tenderness  There is no rebound  Neurological: She is alert and oriented to person, place, and time  Psychiatric: She has a normal mood and affect         Medications:    Current Facility-Administered Medications:     acetaminophen (TYLENOL) tablet 650 mg, 650 mg, Oral, Q6H PRN, Javy Parsons PA-C, 650 mg at 11/24/19 1756    amLODIPine (NORVASC) tablet 10 mg, 10 mg, Oral, QAM, Dominic Ramirez MD, 10 mg at 11/28/19 0801    ARIPiprazole (ABILIFY) tablet 20 mg, 20 mg, Oral, HS, Basilio Dorsey MD, 20 mg at 11/27/19 2158    aspirin (ECOTRIN LOW STRENGTH) EC tablet 81 mg, 81 mg, Oral, Daily, Basilio Dorsey MD, 81 mg at 11/28/19 0801    busPIRone (BUSPAR) tablet 5 mg, 5 mg, Oral, BID, Basilio Dorsey MD, 5 mg at 11/28/19 0801    cholecalciferol (VITAMIN D3) tablet 1,000 Units, 1,000 Units, Oral, TID, Cheri Mei MD, 1,000 Units at 11/28/19 0801    cloNIDine (CATAPRES) tablet 0 3 mg, 0 3 mg, Oral, Q8H [address], Cheri Mei MD, 0 3 mg at 11/28/19 0524    DAPTOmycin (CUBICIN) 500 mg in sodium chloride 0 9 % 50 mL IVPB, 6 mg/kg (Adjusted), Intravenous, Q48H, Stiven Monterroso MD, Last Rate: 100 mL/hr at 11/26/19 1144, 500 mg at 11/26/19 1144    doxazosin (CARDURA) tablet 4 mg, 4 mg, Oral, HS, [facility], [facility], 4 mg at 11/27/19 2158    DULoxetine (CYMBALTA) delayed release capsule 60 mg, 60 mg, Oral, BID, Cheri Mei MD, 60 mg at 11/28/19 0801    ferrous sulfate tablet 325 mg, 325 mg, Oral, Daily With Breakfast, Cheri Mei MD, 325 mg at 84/28/16 3321    folic acid (FOLVITE) tablet 1,000 mcg, 1,000 mcg, Oral, Daily, Cheri Mei MD, 1,000 mcg at 11/28/19 0801    heparin (porcine) subcutaneous injection 5,000 Units, 5,000 Units, Subcutaneous, Q8H [address], 5,000 Units at 11/28/19 0524 **AND** [COMPLETED] Platelet count, , , Once, Cheri Mei MD    hydrALAZINE (APRESOLINE) injection 5 mg, 5 mg, Intravenous, Q6H PRN, Malika Hill MD, 5 mg at 11/26/19 1317    hydrALAZINE (APRESOLINE) tablet 100 mg, 100 mg, Oral, TID, [facility], [facility], 100 mg at 11/28/19 0801    insulin glargine (LANTUS) subcutaneous injection 5 Units 0 05 mL, 5 Units, Subcutaneous, HS, Cheri Mei MD, 5 Units at 11/27/19 2159    insulin lispro (HumaLOG) 100 units/mL subcutaneous injection 1-6 Units, 1-6 Units, Subcutaneous, TID AC, 1 Units at 11/24/19 1710 **AND** Fingerstick Glucose (POCT), , , TID AC, Javy Parsons PA-C    insulin lispro (HumaLOG) 100 units/mL subcutaneous injection 1-6 Units, 1-6 Units, Subcutaneous, HS, Javy Parsons PA-C, Stopped at 11/23/19 2147    levothyroxine tablet 125 mcg, 125 mcg, Oral, Early Morning, Cheri Mei MD, 125 mcg at 11/28/19 0524    LORazepam (ATIVAN) tablet 2 mg, 2 mg, Oral, TID PRN, Cheri Mei MD, 2 mg at 11/27/19 1713    metoprolol tartrate (LOPRESSOR) tablet 50 mg, 50 mg, Oral, Q12H [facility], JayJOHN, 50 mg at 11/28/19 0802    ondansetron (ZOFRAN) injection 4 mg, 4 mg, Intravenous, Q8H PRN, Carolina Hewitt PA-C, 4 mg at 11/24/19 1602    pravastatin (PRAVACHOL) tablet 80 mg, 80 mg, Oral, Daily, Eleanor Rodriguez MD, 80 mg at 11/28/19 0801    predniSONE tablet 5 mg, 5 mg, Oral, Daily, Eleanor Rodriguez MD, 5 mg at 11/28/19 0801    sertraline (ZOLOFT) tablet 200 mg, 200 mg, Oral, Daily, Eleanor Rodriguez MD, 200 mg at 11/28/19 0801    sevelamer (RENAGEL) tablet 800 mg, 800 mg, Oral, TID With Meals, Eleanor Rodriguez MD, 800 mg at 11/28/19 0802    sodium bicarbonate tablet 1,300 mg, 1,300 mg, Oral, TID, Eleanor Rodriguez MD, 1,300 mg at 11/28/19 0801    tacrolimus (PROGRAF) capsule 2 mg, 2 mg, Oral, HS, Kusum Taveras MD, 2 mg at 11/27/19 2158    tacrolimus (PROGRAF) capsule 3 mg, 3 mg, Oral, Daily, Kusum Taveras MD, 3 mg at 11/28/19 0801    Laboratory Results:  Lab Results   Component Value Date    WBC 5 73 11/26/2019    HGB 7 8 (L) 11/27/2019    HCT 25 1 (L) 11/27/2019     (H) 11/26/2019     (L) 11/27/2019     Lab Results   Component Value Date    SODIUM 141 11/28/2019    K 4 0 11/28/2019     (H) 11/28/2019    CO2 18 (L) 11/28/2019    BUN 44 (H) 11/28/2019    CREATININE 2 81 (H) 11/28/2019    GLUC 113 11/28/2019    CALCIUM 8 8 11/28/2019     Lab Results   Component Value Date    CALCIUM 8 8 11/28/2019    PHOS 4 7 (H) 11/28/2019     No results found for: LABPROT

## 2019-11-28 NOTE — PLAN OF CARE
Problem: Potential for Falls  Goal: Patient will remain free of falls  Description  INTERVENTIONS:  - Assess patient frequently for physical needs  -  Identify cognitive and physical deficits and behaviors that affect risk of falls    -  [place] fall precautions as indicated by assessment   - Educate patient/family on patient safety including physical limitations  - Instruct patient to call for assistance with activity based on assessment  - Modify environment to reduce risk of injury  - Consider OT/PT consult to assist with strengthening/mobility  Outcome: Progressing     Problem: PAIN - ADULT  Goal: Verbalizes/displays adequate comfort level or baseline comfort level  Description  Interventions:  - Encourage patient to monitor pain and request assistance  - Assess pain using appropriate pain scale  - Administer analgesics based on type and severity of pain and evaluate response  - Implement non-pharmacological measures as appropriate and evaluate response  - Consider cultural and social influences on pain and pain management  - Notify physician/advanced practitioner if interventions unsuccessful or patient reports new pain  Outcome: Progressing

## 2019-11-28 NOTE — ASSESSMENT & PLAN NOTE
Blood pressure was elevated  181/74, 183/76 on admission     With increase in hydralazine to 75 mg t i d  Blood pressure now trending down to between 150 and 160  Anxiety might be playing a role- she is anxious over possibility of being a dialysis candidate  Plan:  · Continue Norvasc 10 mg daily  · Continue clonidine 0 3 mg t i d   · Continue doxazosin 4 mg daily  · Increased hydralazine to 75 mg t i d   · hydralazine 5 mg injection p r n for SBP more than 160    · Appreciate Nephrology recommendations

## 2019-11-28 NOTE — PROGRESS NOTES
Progress Note - Shelly Leggett 1965, 53 y o  female MRN: [id number]    Unit/Bed#: S -01 Encounter: [id number]    Primary Care Provider: Idris Bourgeois MD   Date and time admitted to hospital: 11/23/2019  2:47 PM        * Recurrent UTI  Assessment & Plan    Systemically well  Urine culture growing enterococcus fecium VRE  Antibiotic changed to daptomycin based on sensitivity  Also sensitive to linezolid, nitrofurantoin  Would not be able to use nitrofurantoin on discharge secondary to CKD 4  And linezolid contraindicated secondary to patient being on buspar  UTI secondary to bladder drainage of the exocrine pancreas  Not due to [place]   Currently on daptomycin daily 3  Will need to continue antibiotics for a 7 day course through 12 02/2019  Case management looking into option of Tedizolid as an oral option to insurance approves and cost is not prohibitive  Plan:  · Appreciate ID consult   · Continue daptomycin#3/7  · C/w immunosuppressives for renal transplant in 1998   · Urine culture: VRE Enterococcus fecium    Essential hypertension  Assessment & Plan  Blood pressure was elevated  181/74, 183/76 on admission     With increase in hydralazine to 75 mg t i d  Blood pressure now trending down to between 150 and 160  Anxiety might be playing a role- she is anxious over possibility of being a dialysis candidate  Plan:  · Continue Norvasc 10 mg daily  · Continue clonidine 0 3 mg t i d   · Continue doxazosin 4 mg daily  · Increased hydralazine to 75 mg t i d   · hydralazine 5 mg injection p r n for SBP more than 160  · Appreciate Nephrology recommendations      Multiple myeloma not having achieved remission [facility])  Assessment & Plan  Patient with past medical history of multiple myeloma not having achieved remission  · On Revlimid  · Will follow up with Oncology upon discharge  Renal transplant recipient  Assessment & Plan  Renal and pancreatic transplant recipient in 1998  Stable    Plan:  · Continue tacrolimus and prednisone  Tacrolimus level is 3 2  · Monitor creatinine which is currently at 3 05-slight down trend from 3 2  Baseline creatinine of 2 5    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 [facility])  Assessment & Plan  Lab Results   Component Value Date    HGBA1C 5 1 09/09/2019       Recent Labs     11/27/19  1136 11/27/19  1636 11/27/19  2104 11/28/19  0656   POCGLU 111 148* 118 111       Blood Sugar Average: Last 72 hrs:  (P) 117 [id number]   Lantus 5 units  Blood sugars remained stable at current regimen    Chronic kidney disease, stage 4 (severe) ([facility] [address] )  Assessment & Plan  Patient with past medical history of renal transplant in 1998  Patient's baseline creatinine is 2 4-2 8  On admission patient's creatinine was 3 15 has now trended down to 2 81 today  ·     Plan  · IVF  Appreciate nephrology input  · Monitor creatinine levels  Also with acidosis secondary to chronic bicarb loss  Since pancreatic exocrine secretions her drained into the bladder  Continue with bicarb  repletion    Anemia  Assessment & Plan  Patient with chronic kidney disease and as a result of chronic anemia  Hemoglobin 7 3 at baseline  · 11/25 patient's hemoglobin decreased to 6 6  Patient received 1 unit of PRBC and hemoglobin currently is around 7 8  Plan:  · Monitor hemoglobin    · Continue oral ferrous sulfate  ·     Ambulatory dysfunction  Assessment & Plan  Patient with generalized weakness and ambulatory dysfunction secondary to deconditioning from current condition  Will have PT and OT evaluate to see whether she is safe to be discharged home versus needing rehab  Subjective:    Patient feels well  No complaints  Creatinine has trended down to 2 8 which is apparently her baseline  Urine culture has grown enterococcus fecium VRE-and patient currently responding well to daptomycin  Seven day course of antibiotics planned    Oral options would be linezolid or nitrofurantoin based on sensitivities over both are contraindicated secondary to renal failure as well as patient being on antipsychotics  Alternative of Tedilozid assigned oral option being looked at to see whether insurance would approve an cost is affordable  Physical Exam:   Vitals: Blood pressure 154/58, pulse 58, temperature (!) 97 4 °F (36 3 °C), temperature source Oral, resp  rate 18, weight 110 kg (242 lb 15 2 oz), SpO2 96 %  ,Body mass index is 36 94 kg/m²  Gen:  Pleasant, non-tachypnic, non-dyspnic  Conversant  Heart: regular rate and rhythm, S1S2 present, no murmur, rub or gallop  Lungs: clear to ausculatation bilaterally  No wheezing, crackless, or rhonchi  No accessory muscle use or respiratory distress  Abd: soft, non-tender, non-distended  NABS, no guarding, rebound or peritoneal signs  Extremities: no clubbing, cyanosis or edema  2+pedal pulses bilaterally  Full range of motion  Neuro: awake, alert and oriented  Cranial nerves 2-12 intact  Strength and sensation grossly intact  Skin: warm and dry: no petechiae, purpura and rash      LABS:   Results from last 7 days   Lab Units 11/27/19  0611 11/26/19  0811 11/25/19  0459 11/24/19  0511   WBC Thousand/uL  --  5 73 5 39 6 48   HEMOGLOBIN g/dL 7 8* 8 0* 6 6* 7 1*   HEMATOCRIT % 25 1* 26 7* 22 1* 23 8*   PLATELETS Thousands/uL 145* 153 145* 147*     Results from last 7 days   Lab Units 11/28/19  0511 11/27/19  0454 11/26/19  0811   POTASSIUM mmol/L 4 0 4 0 4 3   CHLORIDE mmol/L 110* 111* 111*   CO2 mmol/L 18* 18* 18*   BUN mg/dL 44* 44* 46*   CREATININE mg/dL 2 81* 3 03* 3 20*   CALCIUM mg/dL 8 8 8 1* 8 1*       Intake/Output Summary (Last 24 hours) at 11/28/2019 0857  Last data filed at 11/28/2019 0421  Gross per 24 hour   Intake 480 ml   Output 1200 ml   Net -720 ml           Current Facility-Administered Medications:  acetaminophen 650 mg Oral Q6H PRN Javy P Parsons, PA-C    amLODIPine 10 mg Oral QAM Nikunjkumar T Isa Crenshaw MD    ARIPiprazole 20 mg Oral HS Paul Jordan MD    aspirin 81 mg Oral Daily Paul Jordan MD    busPIRone 5 mg Oral BID Paul Jordan MD    cholecalciferol 1,000 Units Oral TID Paul Jordan MD    cloNIDine 0 3 mg Oral [facility] Paul Jordan MD    DAPTOmycin 6 mg/kg (Adjusted) Intravenous Q48H Stivencrystal Monterroso MD Last Rate: 500 mg (11/26/19 1144)   doxazosin 4 mg Oral HS Milana Roland PA-C    DULoxetine 60 mg Oral BID Paul Jordan MD    ferrous sulfate 325 mg Oral Daily With Breakfast Paul Jordan MD    folic acid 4,575 mcg Oral Daily Paul Jordan MD    heparin (porcine) 5,000 Units Subcutaneous [facility] Paul Jordan MD    hydrALAZINE 5 mg Intravenous Q6H PRN Chrissie Cortez MD    hydrALAZINE 100 mg Oral TID Belia Marquez PA-C    insulin glargine 5 Units Subcutaneous HS Paul Jodran MD    insulin lispro 1-6 Units Subcutaneous TID AC Javy Parsons PA-C    insulin lispro 1-6 Units Subcutaneous HS Javy Valenzuela PA-C    levothyroxine 125 mcg Oral Early Morning Paul Jordan MD    LORazepam 2 mg Oral TID PRN Paul Jordan MD    metoprolol tartrate 50 mg Oral Q12H [address] Belia Marquez PA-C    ondansetron 4 mg Intravenous Q8H PRN Scarlett Pelaez PA-C    pravastatin 80 mg Oral Daily Paul Jordan MD    predniSONE 5 mg Oral Daily Paul Jordan MD    sertraline 200 mg Oral Daily Paul Jordan MD    sevelamer 800 mg Oral TID With Meals Paul Jordan MD    sodium bicarbonate 1,300 mg Oral TID Paul Jordan MD    tacrolimus 2 mg Oral HS Golden Pelaez MD    tacrolimus 3 mg Oral Daily Golden Pelaez MD        Family update- updated father

## 2019-11-28 NOTE — ASSESSMENT & PLAN NOTE
Systemically well  Urine culture growing enterococcus fecium VRE  Antibiotic changed to daptomycin based on sensitivity  Also sensitive to linezolid, nitrofurantoin  Would not be able to use nitrofurantoin on discharge secondary to CKD 4  And linezolid contraindicated secondary to patient being on buspar  UTI secondary to bladder drainage of the exocrine pancreas  Not due to [place]   Currently on daptomycin daily 3  Will need to continue antibiotics for a 7 day course through 12 02/2019  Case management looking into option of Tedizolid as an oral option to insurance approves and cost is not prohibitive      Plan:  · Appreciate ID consult   · Continue daptomycin#3/7  · C/w immunosuppressives for renal transplant in 1998   · Urine culture: VRE Enterococcus fecium

## 2019-11-29 ENCOUNTER — TELEPHONE (OUTPATIENT)
Dept: HEMATOLOGY ONCOLOGY | Facility: CLINIC | Age: 54
End: 2019-11-29

## 2019-11-29 LAB
CK SERPL-CCNC: 31 U/L (ref 26–192)
GLUCOSE SERPL-MCNC: 117 MG/DL (ref 65–140)
GLUCOSE SERPL-MCNC: 117 MG/DL (ref 65–140)
GLUCOSE SERPL-MCNC: 131 MG/DL (ref 65–140)
GLUCOSE SERPL-MCNC: 134 MG/DL (ref 65–140)
HCT VFR BLD AUTO: 26.6 % (ref 34.8–46.1)
HGB BLD-MCNC: 8.1 G/DL (ref 11.5–15.4)

## 2019-11-29 PROCEDURE — 85014 HEMATOCRIT: CPT | Performed by: HOSPITALIST

## 2019-11-29 PROCEDURE — 97163 PT EVAL HIGH COMPLEX 45 MIN: CPT

## 2019-11-29 PROCEDURE — G8987 SELF CARE CURRENT STATUS: HCPCS

## 2019-11-29 PROCEDURE — G8978 MOBILITY CURRENT STATUS: HCPCS

## 2019-11-29 PROCEDURE — 99232 SBSQ HOSP IP/OBS MODERATE 35: CPT | Performed by: INTERNAL MEDICINE

## 2019-11-29 PROCEDURE — 85018 HEMOGLOBIN: CPT | Performed by: HOSPITALIST

## 2019-11-29 PROCEDURE — G8979 MOBILITY GOAL STATUS: HCPCS

## 2019-11-29 PROCEDURE — 97167 OT EVAL HIGH COMPLEX 60 MIN: CPT

## 2019-11-29 PROCEDURE — 99232 SBSQ HOSP IP/OBS MODERATE 35: CPT | Performed by: HOSPITALIST

## 2019-11-29 PROCEDURE — 82550 ASSAY OF CK (CPK): CPT | Performed by: INTERNAL MEDICINE

## 2019-11-29 PROCEDURE — G8988 SELF CARE GOAL STATUS: HCPCS

## 2019-11-29 PROCEDURE — 82948 REAGENT STRIP/BLOOD GLUCOSE: CPT

## 2019-11-29 RX ORDER — PROMETHAZINE HYDROCHLORIDE 25 MG/ML
25 INJECTION, SOLUTION INTRAMUSCULAR; INTRAVENOUS EVERY 6 HOURS PRN
Status: DISCONTINUED | OUTPATIENT
Start: 2019-11-29 | End: 2019-12-06 | Stop reason: HOSPADM

## 2019-11-29 RX ORDER — LACTULOSE 20 G/30ML
20 SOLUTION ORAL DAILY PRN
Status: DISCONTINUED | OUTPATIENT
Start: 2019-11-29 | End: 2019-12-01

## 2019-11-29 RX ADMIN — BUSPIRONE HYDROCHLORIDE 5 MG: 5 TABLET ORAL at 08:59

## 2019-11-29 RX ADMIN — CHOLECALCIFEROL TAB 25 MCG (1000 UNIT) 1000 UNITS: 25 TAB at 16:21

## 2019-11-29 RX ADMIN — HEPARIN SODIUM 5000 UNITS: 5000 INJECTION INTRAVENOUS; SUBCUTANEOUS at 21:50

## 2019-11-29 RX ADMIN — HYDRALAZINE HYDROCHLORIDE 5 MG: 20 INJECTION INTRAMUSCULAR; INTRAVENOUS at 06:35

## 2019-11-29 RX ADMIN — PRAVASTATIN SODIUM 80 MG: 80 TABLET ORAL at 09:00

## 2019-11-29 RX ADMIN — SODIUM BICARBONATE 650 MG TABLET 1300 MG: at 21:46

## 2019-11-29 RX ADMIN — ONDANSETRON 4 MG: 2 INJECTION INTRAMUSCULAR; INTRAVENOUS at 13:07

## 2019-11-29 RX ADMIN — METOPROLOL TARTRATE 50 MG: 50 TABLET, FILM COATED ORAL at 21:46

## 2019-11-29 RX ADMIN — HEPARIN SODIUM 5000 UNITS: 5000 INJECTION INTRAVENOUS; SUBCUTANEOUS at 13:14

## 2019-11-29 RX ADMIN — CLONIDINE HYDROCHLORIDE 0.3 MG: 0.1 TABLET ORAL at 21:48

## 2019-11-29 RX ADMIN — BUSPIRONE HYDROCHLORIDE 5 MG: 5 TABLET ORAL at 17:54

## 2019-11-29 RX ADMIN — SODIUM BICARBONATE 650 MG TABLET 1300 MG: at 09:00

## 2019-11-29 RX ADMIN — HYDRALAZINE HYDROCHLORIDE 100 MG: 25 TABLET ORAL at 09:00

## 2019-11-29 RX ADMIN — FERROUS SULFATE TAB 325 MG (65 MG ELEMENTAL FE) 325 MG: 325 (65 FE) TAB at 09:00

## 2019-11-29 RX ADMIN — CHOLECALCIFEROL TAB 25 MCG (1000 UNIT) 1000 UNITS: 25 TAB at 09:00

## 2019-11-29 RX ADMIN — HYDRALAZINE HYDROCHLORIDE 100 MG: 25 TABLET ORAL at 16:20

## 2019-11-29 RX ADMIN — DOXAZOSIN 4 MG: 4 TABLET ORAL at 21:46

## 2019-11-29 RX ADMIN — PREDNISONE 5 MG: 5 TABLET ORAL at 08:59

## 2019-11-29 RX ADMIN — DULOXETINE HYDROCHLORIDE 60 MG: 60 CAPSULE, DELAYED RELEASE ORAL at 17:53

## 2019-11-29 RX ADMIN — LEVOTHYROXINE SODIUM 125 MCG: 125 TABLET ORAL at 05:02

## 2019-11-29 RX ADMIN — ARIPIPRAZOLE 20 MG: 5 TABLET ORAL at 21:47

## 2019-11-29 RX ADMIN — DULOXETINE HYDROCHLORIDE 60 MG: 60 CAPSULE, DELAYED RELEASE ORAL at 08:59

## 2019-11-29 RX ADMIN — TACROLIMUS 2 MG: 1 CAPSULE ORAL at 21:45

## 2019-11-29 RX ADMIN — SODIUM BICARBONATE 650 MG TABLET 1300 MG: at 16:21

## 2019-11-29 RX ADMIN — CHOLECALCIFEROL TAB 25 MCG (1000 UNIT) 1000 UNITS: 25 TAB at 21:53

## 2019-11-29 RX ADMIN — ASPIRIN 81 MG: 81 TABLET, COATED ORAL at 08:59

## 2019-11-29 RX ADMIN — CLONIDINE HYDROCHLORIDE 0.3 MG: 0.1 TABLET ORAL at 05:02

## 2019-11-29 RX ADMIN — TACROLIMUS 3 MG: 1 CAPSULE ORAL at 08:59

## 2019-11-29 RX ADMIN — HEPARIN SODIUM 5000 UNITS: 5000 INJECTION INTRAVENOUS; SUBCUTANEOUS at 05:02

## 2019-11-29 RX ADMIN — METOPROLOL TARTRATE 50 MG: 50 TABLET, FILM COATED ORAL at 08:59

## 2019-11-29 RX ADMIN — SEVELAMER HYDROCHLORIDE 800 MG: 800 TABLET, FILM COATED PARENTERAL at 16:23

## 2019-11-29 RX ADMIN — SEVELAMER HYDROCHLORIDE 800 MG: 800 TABLET, FILM COATED PARENTERAL at 11:44

## 2019-11-29 RX ADMIN — CLONIDINE HYDROCHLORIDE 0.3 MG: 0.1 TABLET ORAL at 13:09

## 2019-11-29 RX ADMIN — FOLIC ACID 1000 MCG: 1 TABLET ORAL at 08:59

## 2019-11-29 RX ADMIN — SERTRALINE HYDROCHLORIDE 200 MG: 100 TABLET ORAL at 09:00

## 2019-11-29 RX ADMIN — HYDRALAZINE HYDROCHLORIDE 100 MG: 25 TABLET ORAL at 21:49

## 2019-11-29 RX ADMIN — INSULIN GLARGINE 5 UNITS: 100 INJECTION, SOLUTION SUBCUTANEOUS at 21:50

## 2019-11-29 RX ADMIN — BISACODYL 10 MG: 5 TABLET, COATED ORAL at 12:12

## 2019-11-29 RX ADMIN — SEVELAMER HYDROCHLORIDE 800 MG: 800 TABLET, FILM COATED PARENTERAL at 09:01

## 2019-11-29 RX ADMIN — LORAZEPAM 2 MG: 1 TABLET ORAL at 16:20

## 2019-11-29 RX ADMIN — AMLODIPINE BESYLATE 10 MG: 10 TABLET ORAL at 08:59

## 2019-11-29 NOTE — PHYSICAL THERAPY NOTE
PHYSICAL THERAPY EVALUATION  NAME:  Brien Willett  DATE: 11/29/19    AGE:   53 y o  Mrn:   [id number]  ADMIT DX:  High blood pressure [I10]  Abdominal pain [R10 9]  Hypertension [I10]  Renal transplant disorder [T86 10]  EDUARDO (acute kidney injury) ([facility] [address] ) [N17 9]  Pancreatic abscess of pancreas transplant [T86 898, K85 90]    Past Medical History:   Diagnosis Date    Abnormal liver function test     Acute kidney injury ([facility] [address] )     Acute on chronic congestive heart failure (HCC)     Allergic urticaria     Anemia     Cancer (HCC)     Multiple myeloma    Cervical dysplasia     Cholelithiasis     Chronic diastolic (congestive) heart failure ([facility] [address] ) 9/18/2017    Diabetes mellitus ([facility] [address] )     Previous, controlled with diet    Diabetes mellitus with foot ulcer ([facility] [address] )     Disease of thyroid gland     Encephalopathy     Hematuria     + leak est- secondary to UTIs/panc drainage    History of transfusion     Hyperkalemia     Hypertension     Iliotibial band syndrome     Lumbar radiculopathy     Multiple myeloma (HCC)     Multiple myeloma ([facility] [address] )     Night blindness     Nonrheumatic aortic (valve) insufficiency     Pneumonia     Renal disorder     Retinopathy     Seborrhea     Seizure ([facility] [address] )     Shingles     Sinus tachycardia     B blocker - cardio echo stress test 02 normal/neg LE doppler 2/02 OK and 12/07    Status post simultaneous kidney and pancreas transplant ([facility] [address] )     Toe amputation status     Trochanteric bursitis      Length Of Stay: 6  Performed at least 2 patient identifiers during session: Name and Birthday  PHYSICAL THERAPY EVALUATION :    11/29/19 1500   Note Type   Note type Eval only   Pain Assessment   Pain Assessment No/denies pain  (but + constipation and nausea)   Home Living   Type of [address] One level;Stairs to enter with rails; Able to live on main level with bedroom/bathroom  (3STE)   Home Equipment Walker;Cane;Grab bars   Additional Comments Pt reports living alone in apt w/ 3 JESSI  Pt added that she is moving to [facility] w/ family in March    Prior Function   Level of [place] Independent with ADLs and functional mobility   Lives With Alone   Receives Help From Family; Other (Comment)  ([facility]NA)   ADL Assistance Independent   IADLs Needs assistance  (+ drive)   Falls in the last 6 months 1 to 4  (Pt reports fall history)   Vocational On disability   Comments Pt reports furniture walking w/ in apt and uses cane vs walker for community mobility and to/ from apt to car   Restrictions/Precautions   Weight Bearing Precautions Per Order No   Braces or Orthoses   (orthotic choes L LE (pt not wearing))   Other Precautions Contact/isolation; Bed Alarm; Chair Alarm; Fall Risk   General   Family/Caregiver Present No   Cognition   Arousal/Participation Alert   Attention Within functional limits   Orientation Level Oriented X4   Memory Within functional limits   Following Commands Follows multistep commands with increased time or repetition   RUE Assessment   RUE Assessment WFL   RUE Strength   RUE Overall Strength Within Functional Limits - able to perform ADL tasks with strength   LUE Assessment   LUE Assessment WFL   LUE Strength   LUE Overall Strength Within Functional Limits - able to perform ADL tasks with strength   Strength RLE   R Hip Flexion 4-/5   R Knee Extension 4/5   R Ankle Dorsiflexion 4/5   Strength LLE   L Hip Flexion 4-/5   L Knee Extension 4/5   L Ankle Dorsiflexion 4-/5   Coordination   Movements are Fluid and Coordinated 0   Coordination and Movement Description +tremors @ rest   Light Touch   RLE Light Touch Impaired   RLE Light Touch Comments self reported, at feet   LLE Light Touch Impaired   LLE Light Touch Comments self reported, at feet   Transfers   Sit to Stand 5  Supervision   Additional items Assist x 1; Increased time required;Verbal cues   Stand to Sit 5  Supervision   Additional items Assist x 2; Increased time required;Verbal cues Ambulation/Elevation   Gait pattern Step to;Excessively slow; Short stride; Shuffling   Gait Assistance 5  Supervision   Additional items Assist x 1   Assistive Device Rolling walker   Distance 35' limited due to nausea   Stair Management Assistance Not tested  (due to nausea)   Balance   Static Sitting Normal   Static Standing Fair -   Ambulatory Fair -   Endurance Deficit   Endurance Deficit Yes   Endurance Deficit Description limited due to nausea   Activity Tolerance   Activity Tolerance Patient limited by fatigue   Medical Staff Made Aware spoke to [facility] OT   Nurse Made Aware spoke to [facility] RN   Assessment   Prognosis Fair   Problem List Decreased strength;Decreased range of motion;Decreased endurance; Impaired balance;Decreased mobility; Decreased coordination; Impaired sensation;Obesity  (gait deviations)   Barriers to Discharge Inaccessible home environment   Goals   Patient Goals Pt added that she would like to be able to go to the beach   STG Expiration Date 12/09/19   PT Treatment Day 0   Plan   Treatment/Interventions Functional transfer training;Elevations;LE strengthening/ROM; Therapeutic exercise; Endurance training;Bed mobility;Gait training;Patient/family training;Cognitive reorientation;Equipment eval/education;Spoke to nursing;OT   PT Frequency 5x/wk   Recommendation   Recommendation Home with family support;Home PT   Equipment Recommended Walker   Barthel Index   Feeding 10   Bathing 0   Grooming Score 5   Dressing Score 5   Bladder Score 10   Bowels Score 10   Toilet Use Score 5   Transfers (Bed/Chair) Score 10   Mobility (Level Surface) Score 0   Stairs Score 0   Barthel Index Score 55   (Please find full objective findings from PT assessment regarding body systems outlined above)  Assessment: Pt is a 53 y o  female seen for PT evaluation s/p admit to [facility] on 11/23/2019 w/ Recurrent UTI  Order placed for PT    Upon evaluation: Pt requires S assistance for transfers and ambulation with rolling walker in room without her orthopedic shoes  Pt's clinical presentation is currently unstable/unpredictable given the functional mobility deficits above, especially weakness, gait deviations, decreased activity tolerance and decreased functional mobility tolerance, coupled with fall risks including hx of falls, impaired balance and impaired coordination, and combined with medical complications of abnormal renal lab values, abnormal H&H and multiple readmissions  Pt to benefit from continued skilled PT tx while in hospital and upon DC to address deficits as defined above and maximize level of functional mobility  From PT/mobility standpoint, recommendation at time of d/c would be Home PT and home with family support pending progress and stairs trial for JESSI in order to maximize pt's functional independence and consistency w/ mobility in order to facilitate return to PLOF  Recommend trial with walker next 1-2 sessions and case management consult  Goals: Pt will: Perform bed mobility tasks with modified I to prepare for transfers and reposition in bed  Perform transfers with modified I to improve ease of transfers  Perform ambulation with least restrictive device for up to 50' w/o uncorrected losses of balance and with  Supervision  to increase Indep in home environment and decrease risk for falls, and to eventually get to the beach  Perform 3 stairs w/raling and w/Supervision to return to home with JESSI  Comorbidities affecting pt's physical performance at time of assessment include: DM, HTN, limited vision, cancer history and/or treatment and EDUARDO, toe amputation, seizure, renal and pancreas transplant Personal factors affecting pt at time of IE include: steps to enter environment, past experience, inability to perform IADLs and recent fall(s)       Narda Ashley, PT, DPT

## 2019-11-29 NOTE — PLAN OF CARE
Problem: Potential for Falls  Goal: Patient will remain free of falls  Description  INTERVENTIONS:  - Assess patient frequently for physical needs  -  Identify cognitive and physical deficits and behaviors that affect risk of falls    -  [place] fall precautions as indicated by assessment   - Educate patient/family on patient safety including physical limitations  - Instruct patient to call for assistance with activity based on assessment  - Modify environment to reduce risk of injury  - Consider OT/PT consult to assist with strengthening/mobility  11/28/2019 2026 by Kenan Garcia RN  Outcome: Progressing  11/28/2019 1131 by Kenan Garcia RN  Outcome: Progressing     Problem: PAIN - ADULT  Goal: Verbalizes/displays adequate comfort level or baseline comfort level  Description  Interventions:  - Encourage patient to monitor pain and request assistance  - Assess pain using appropriate pain scale  - Administer analgesics based on type and severity of pain and evaluate response  - Implement non-pharmacological measures as appropriate and evaluate response  - Consider cultural and social influences on pain and pain management  - Notify physician/advanced practitioner if interventions unsuccessful or patient reports new pain  11/28/2019 2026 by Kenan Garcia RN  Outcome: Progressing  11/28/2019 1131 by Kenan Garcia RN  Outcome: Progressing

## 2019-11-29 NOTE — ASSESSMENT & PLAN NOTE
Patient doing well  Urine culture growing enterococcus fecium VRE  Antibiotic changed to daptomycin based on sensitivity  Also sensitive to linezolid, nitrofurantoin  Would not be able to use nitrofurantoin  secondary to CKD 4  and linezolid contraindicated secondary to patient being on buspar  UTI secondary to bladder drainage of the exocrine pancreas  Not due to [place]   Currently on daptomycin daily 7  Will need to continue antibiotics for a 7 day course through 12 02/2019  Case management looking into option of Tedizolid as an oral option to insurance approves and cost is not prohibitive      Plan:  · Appreciate ID consult   · Continue daptomycin#4/7  · C/w immunosuppressives for renal transplant in 1998   · Urine culture: VRE Enterococcus fecium

## 2019-11-29 NOTE — OCCUPATIONAL THERAPY NOTE
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 ([facility] )    Essential hypertension    Anemia    Multiple myeloma not having achieved remission [facility])    Ambulatory dysfunction    Past Medical History  Past Medical History:   Diagnosis Date    Abnormal liver function test     Acute kidney injury ([facility] )     Acute on chronic congestive heart failure (HCC)     Allergic urticaria     Anemia     Cancer (HCC)     Multiple myeloma    Cervical dysplasia     Cholelithiasis     Chronic diastolic (congestive) heart failure ([facility] ) 9/18/2017    Diabetes mellitus (HCC)     Previous, controlled with diet    Diabetes mellitus with foot ulcer ([facility] )     Disease of thyroid gland     Encephalopathy     Hematuria     + leak est- secondary to UTIs/panc drainage    History of transfusion     Hyperkalemia     Hypertension     Iliotibial band syndrome     Lumbar radiculopathy     Multiple myeloma (HCC)     Multiple myeloma (HCC)     Night blindness     Nonrheumatic aortic (valve) insufficiency     Pneumonia     Renal disorder     Retinopathy     Seborrhea     Seizure ([facility] )     Shingles     Sinus tachycardia     B blocker - cardio echo stress test 02 normal/neg LE doppler 2/02 OK and 12/07    Status post simultaneous kidney and pancreas transplant ([facility] )     Toe amputation status     Trochanteric bursitis      Past Surgical History  Past Surgical History:   Procedure Laterality Date    CATARACT EXTRACTION      CHOLECYSTECTOMY      COLONOSCOPY      two polyps in the rectum removed and biopsied diverticulosis in the sigmoid colon, external hemorrhoiods- Dr Barfield    COMBINED KIDNEY-PANCREAS TRANSPLANT N/A     CT BONE MARROW BIOPSY AND ASPIRATION 4/17/2019    CYSTOSCOPY N/A 10/13/2016    Procedure: CYSTOSCOPY, retrograde pyelogram, biopsy of ureteral polyp; Surgeon: Сергей Roach MD;  [facility]: [facility];  Service:    Dusty Landrum DILATION AND CURETTAGE OF UTERUS      ESOPHAGOGASTRODUODENOSCOPY N/A 11/20/2017    Procedure: ESOPHAGOGASTRODUODENOSCOPY (EGD); Surgeon: Dionte Paredes DO;  Location: [facility]; Service: Gastroenterology    EYE SURGERY      cataracts    FOOT AMPUTATION THROUGH METATARSAL Left     FOOT SURGERY Right     excision of metatarsal heads    HALLUX VALGUS CORRECTION Right     NEPHRECTOMY TRANSPLANTED ORGAN      PANCREATIC TRANSPLANT REMOVAL  1998 11/29/19 0954   Note Type   Note type Eval/Treat   Restrictions/Precautions   Weight Bearing Precautions Per Order No   Braces or Orthoses   (orthotic choes L LE (pt not wearing))   Other Precautions Contact/isolation; Bed Alarm; Chair Alarm; Fall Risk   Pain Assessment   Pain Assessment No/denies pain   Pain Score No Pain   Home Living   Type of Home Apartment   Home Layout One level; Other (Comment); Stairs to enter with rails; Able to live on main level with bedroom/bathroom  (3 JESSI)   Bathroom Shower/Tub Tub/shower unit   Bathroom Toilet Standard   Bathroom Equipment Grab bars in shower; Shower chair;Commode   [address] Walker;Cane;Grab bars   Additional Comments Pt reports living alone in apt w/ 3 JESSI  Pt added that she is [place] [facility] [place] [facility] [place]/ family in [place]   Prior Function   Level of [place] Independent with ADLs and functional mobility   Lives With Alone   Receives Help From Family; Other (Comment)  ([facility])   ADL Assistance Independent   IADLs Needs assistance  (+ drive)   Falls in the last 6 months 1 to 4  (Pt reports fall history)   Vocational On disability   Comments Pt reports furniture cruising w/ in apt and uses cane vs walker for community mobility and to/ from apt to car   Pt drives and reports her family assist w/ laundry and heavy cleaning   Lifestyle   Autonomy Pt reports I w/ ADL PTA using cane vs walker for community mobility  No AD w/ in apt   Reciprocal Relationships Pt reports living alone and supports locaql family assist w/ IADL   Service to Others Pt reports on disability   Intrinsic Gratification Pt reports enjoying crafts especially making jewelry and rugs   ADL   Where Assessed Edge of bed   Eating Assistance 7  Independent   Eating Deficit Setup   Grooming Assistance 6  Modified Independent   Grooming Deficit Standing with assistive device; Setup; Increased time to complete;Supervision/safety   UB Bathing Assistance Unable to assess  (demo ROM / coordination to complete)   LB Bathing Assistance Unable to assess   UB Dressing Assistance 6  Modified independent   UB Dressing Deficit Setup; Increased time to complete   LB Dressing Assistance 5  Supervision/Setup   LB Dressing Deficit Setup; Increased time to complete   Bed Mobility   Supine to Sit 6  Modified independent   Additional items Assist x 1;HOB elevated   Sit to Supine Unable to assess   Additional Comments Pt seated OOB in chair post eval w/ needs met, call bell in reach, and chair alarm activated   Transfers   Sit to Stand 5  Supervision   Additional items Assist x 1   Stand to Sit 5  Supervision   Additional items Assist x 1; Armrests; Verbal cues   Functional Mobility   Functional Mobility 5  Supervision   Additional Comments household distances using RW w/ + time     Additional items Rolling walker  (vs no AD short distances w/ in room)   Balance   Static Sitting Normal   Static Standing Fair -   Ambulatory Fair -   Activity Tolerance   Activity Tolerance Patient tolerated treatment well;Patient limited by fatigue   Medical Staff Made Aware spoke to Fabiola POND Service   Nurse Made Aware spoke Alicia dougherty RN Assessment   RUE Assessment WFL   RUE Strength   RUE Overall Strength Within Functional Limits - able to perform ADL tasks with strength   LUE Assessment   LUE Assessment WFL   LUE Strength   LUE Overall Strength Within Functional Limits - able to perform ADL tasks with strength   Hand Function   Gross Motor Coordination Functional   Fine Motor Coordination Functional   Sensation   Light Touch Partial deficits in the LLE  (WFL B UE)   Sharp/Dull Not tested   Additional Comments pt presents w/ decreased sensation to light touch L foot  Pt reports wearing socks w/ in apt and has orthotic shoes   Vision-Basic Assessment   Current Vision Wears glasses all the time   Cognition   Arousal/Participation Alert; Cooperative   Attention Within functional limits   Orientation Level Oriented X4   Memory Within functional limits   Following Commands Follows multistep commands with increased time or repetition   Comments Identified pt by full name and birthdate  Pt able to provide social history and follow directions  Alert, cooperative, and oriented  Assessment   Limitation Decreased ADL status; Decreased endurance;Decreased high-level ADLs   Assessment Pt is a 48yo female [facility] 11/23[facility]  Pt presents w/ recurrent UTI and significant PMH impacting her occupational performance including DM, multiple myeloma, s/p kidney and pancreas transplant, L metatarsal amputation, R hallux valgus correction, HTN, lumbar radiculopathy, seizure  Pt reports living alone in apt w/ 3 JESSI PTA  Pt reports I w/ ADL PTA using walker vs cane for community mobility  Pt drives and reports local family assist w/ IADL as needed (laundry, heavy cleaning)  Upon eval, pt alert and oriented and motivated to return home and be able to get around w/ out assistance or fatigue  Pt completed bed mobility w/ mod I for HOB elevated  Pt completed sit <> stand w/ S and engaged in functional mobility w/ S short distances w/ in room and household distances using RW  Pt completed UBD w/ mod I after set-up and LBD w/ S   Pt presents w/ decreased activity tolerance, decreased endurance, decreased standing tolerance, decreased standing balance, generalized weakness /deconiditoning impacting her I w/ bathing, food prep / cleanup, clothing mgmt, oral hygiene, community mobility and functional mobility  Pt would benefit from OT while in acute care to address deficits and can return to PLOF w/ family support when medically stable for discharge from acute care  Will cotinue to follow   Goals   Patient Goals Pt stated that she would like to return home and bel able to get around better  Pt added that she would like to be able to go to the beach  Plan   Treatment Interventions ADL retraining; Endurance training;Patient/family training;Equipment evaluation/education; Activityengagement; Energy conservation   Goal Expiration Date 12/06/19   OT Frequency 1-2x/wk   Recommendation   OT Discharge Recommendation Home with family support   Equipment Recommended   (pt reports having DME)   OT - OK to Discharge   (when medically stable)   Barthel Index   Feeding 10   Bathing 0   Grooming Score 5   Dressing Score 5   Bladder Score 10   Bowels Score 10   Toilet Use Score 5   Transfers (Bed/Chair) Score 10   Mobility (Level Surface) Score 0   Stairs Score 0   Barthel Index Score 55   Modified [place] Scale   Modified [place] Scale 3   Pt goals to be met by 12/6/2019:  1  Pt will complete functional transfers to all surfaces w/ mod I using AD, DME as needed   2  Pt will complete UBD/LBD w/ mod I after set- up w/ out sign / symptoms of fatigue to max I w/ ADL performance and improve activity engagement to return to PLOF and home alone environment  3  Pt will consistently engage in functional mobility using AD as needed w/ mod I household distances to max I w/ ADL performance  4   Pt will demonstrate increased functional standing tolerance for at least 15 minutes while engaged in ADL standing at sink to max I w/ food prep / clean up and community mobility to return to [facility] [employer], OTR/L

## 2019-11-29 NOTE — PROGRESS NOTES
Progress Note - Infectious Disease   Scottie Dieter 53 y o  female MRN: [id number]  Unit/Bed#: S -01 Encounter: [id number]      Impression/Plan:  69-year-old female patient with history of simultaneous kidney and pancreas transplant done in  with a bladder drainage of the exocrine pancreas, currently admitted for dysuria and malodorous urine  Patient is currently being treated for recurrent UTI  Urine culture growing more than 100,000 colonies of enterococcus faecium, VRE     1- Recurrent UTI:  UA showing pyuria, urine culture positive for more than 100,000 colonies of Enterococcus faecium VRE  CT scan abdomen pelvis done 2019 and reviewed  - continue daptomycin IV through 2019    - patient is on Zoloft and buspirone which makes linezolid contraindicated due to drug interaction        2- Mary on CKD:  Creatinine today 2 8; baseline creatinine around 2 5 mg/dL  - nephrology follow-up, antibiotic dose adjusted according to creatinine clearance     3- History of kidney and pancreas transplant:  The endocrine function of 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   The exocrine secretions are excreted in the bladder, and this might be the reason for recurrent urinary tract infection based on available literature  - continue home immunosuppression regimen     4- MM:  Revlimid as per primary team and Hematology     Plan mentioned above discussed with patient  Case also discussed with primary team     Antibiotics:  Daptomycin day 4    Subjective:  Patient has no fever, chills, sweats; no nausea, vomiting, diarrhea; no cough, shortness of breath;   Dysuria has resolved    Patient reports feeling better    Objective:  Vitals:  Temp:  [97 9 °F (36 6 °C)-98 2 °F (36 8 °C)] 98 2 °F (36 8 °C)  HR:  [55-70] 55  Resp:  [17-18] 18  BP: (162-182)/(54-66) 162/54  SpO2:  [94 %-98 %] 94 %  Temp (24hrs), Av 1 °F (36 7 °C), Min:97 9 °F (36 6 °C), Max:98 2 °F (36 8 °C)  Current: Temperature: 98 2 °F (36 8 °C)    Physical Exam:   General Appearance:  Alert, interactive, nontoxic, no acute distress  Throat: Oropharynx moist without lesions  Lungs:   Clear to auscultation bilaterally; no wheezes, rhonchi or rales; respirations unlabored   Heart:  RRR; no murmur, rub or gallop   Abdomen:   Soft, non-tender, non-distended, positive bowel sounds  Extremities: No clubbing, cyanosis or edema   Skin: No new rashes or lesions  No draining wounds noted         Labs, Imaging, & Other studies:   All pertinent labs and imaging studies were personally reviewed  Results from last 7 days   Lab Units 11/29/19  0516 11/27/19  0611 11/26/19  0811 11/25/19  0459 11/24/19  0511   WBC Thousand/uL  --   --  5 73 5 39 6 48   HEMOGLOBIN g/dL 8 1* 7 8* 8 0* 6 6* 7 1*   PLATELETS Thousands/uL  --  145* 153 145* 147*     Results from last 7 days   Lab Units 11/28/19  0511 11/27/19  0454 11/26/19  0811 11/25/19  0459 11/24/19  0511 11/23/19  1646   SODIUM mmol/L 141 142 144 142 143 140   POTASSIUM mmol/L 4 0 4 0 4 3 4 4 4 3 4 1   CHLORIDE mmol/L 110* 111* 111* 110* 111* 109*   CO2 mmol/L 18* 18* 18* 18* 17* 18*   BUN mg/dL 44* 44* 46* 45* 42* 44*   CREATININE mg/dL 2 81* 3 03* 3 20* 3 15* 3 05* 3 20*   EGFR ml/min/1 73sq m 18 17 16 16 17 16   CALCIUM mg/dL 8 8 8 1* 8 1* 7 7* 7 9* 8 3   AST U/L  --   --   --  12 14 16   ALT U/L  --   --   --  9* 17 25   ALK PHOS U/L  --   --   --  93 104 104     Results from last 7 days   Lab Units 11/24/19  1508   URINE CULTURE  >100,000 cfu/ml Vancomycin Resistant Enterococcus faecium*

## 2019-11-29 NOTE — ASSESSMENT & PLAN NOTE
Lab Results   Component Value Date    HGBA1C 5 1 09/09/2019       Recent Labs     11/28/19  1127 11/28/19  1608 11/28/19 2058 11/29/19  0717   POCGLU 125 126 109 117       Blood Sugar Average: Last 72 hrs:  (P) 116 [id number]   Lantus 5 units     Blood sugars remained stable at current regimen

## 2019-11-29 NOTE — ASSESSMENT & PLAN NOTE
Blood pressure was elevated  181/74, 183/76 on admission     With increase in hydralazine to 75 mg t i d  Blood pressure now trending down to between 150 and 160  Anxiety might be playing a role-    Plan:  · Continue Norvasc 10 mg daily  · Continue clonidine 0 3 mg t i d   · Continue doxazosin 4 mg daily  · Increased hydralazine to 75 mg t i d   · hydralazine 5 mg injection p r n for SBP more than 160    · Appreciate Nephrology recommendations

## 2019-11-29 NOTE — ASSESSMENT & PLAN NOTE
Patient with chronic kidney disease and as a result of chronic anemia  Hemoglobin 7 3 at baseline  · 11/25 patient's hemoglobin decreased to 6 6  Patient received 1 unit of PRBC and hemoglobin currently is around 8 1  Plan:  · Monitor hemoglobin      · Continue oral ferrous sulfate  ·

## 2019-11-29 NOTE — NURSING NOTE
Pt c/o nausea secondary to constipation  She received Zofran at 1307 however is now reporting it's return  Updated SLIM via amion and will see if there's anything else we can do for her

## 2019-11-29 NOTE — ASSESSMENT & PLAN NOTE
Patient with generalized weakness and ambulatory dysfunction secondary to deconditioning from current condition  Will have PT and OT evaluate to see whether she is safe to be discharged home versus needing rehab  Anticipate DC onTuesday after completion of IV antibiotics

## 2019-11-29 NOTE — PLAN OF CARE
Problem: Potential for Falls  Goal: Patient will remain free of falls  Description  INTERVENTIONS:  - Assess patient frequently for physical needs  -  Identify cognitive and physical deficits and behaviors that affect risk of falls    -  [place] fall precautions as indicated by assessment   - Educate patient/family on patient safety including physical limitations  - Instruct patient to call for assistance with activity based on assessment  - Modify environment to reduce risk of injury  - Consider OT/PT consult to assist with strengthening/mobility  Outcome: Progressing     Problem: PAIN - ADULT  Goal: Verbalizes/displays adequate comfort level or baseline comfort level  Description  Interventions:  - Encourage patient to monitor pain and request assistance  - Assess pain using appropriate pain scale  - Administer analgesics based on type and severity of pain and evaluate response  - Implement non-pharmacological measures as appropriate and evaluate response  - Consider cultural and social influences on pain and pain management  - Notify physician/advanced practitioner if interventions unsuccessful or patient reports new pain  Outcome: Progressing     Problem: INFECTION - ADULT  Goal: Absence or prevention of progression during hospitalization  Description  INTERVENTIONS:  - Assess and monitor for signs and symptoms of infection  - Monitor lab/diagnostic results  - Monitor all insertion sites, i e  indwelling lines, tubes, and drains  - Monitor endotracheal if appropriate and nasal secretions for changes in amount and color  - [place] appropriate cooling/warming therapies per order  - Administer medications as ordered  - Instruct and encourage patient and family to use good hand hygiene technique  - Identify and instruct in appropriate isolation precautions for identified infection/condition  Outcome: Progressing     Problem: GENITOURINARY - ADULT  Goal: Maintains or returns to baseline urinary function  Description  INTERVENTIONS:  - Assess urinary function  - Encourage oral fluids to ensure adequate hydration if ordered  - Administer IV fluids as ordered to ensure adequate hydration  - Administer ordered medications as needed  - Offer frequent toileting  - Follow urinary retention protocol if ordered  Outcome: Progressing  Goal: Absence of urinary retention  Description  INTERVENTIONS:  - Assess patients ability to void and empty bladder  - Monitor I/O  - Bladder scan as needed  - Discuss with physician/AP medications to alleviate retention as needed  - Discuss catheterization for long term situations as appropriate  Outcome: Progressing

## 2019-11-29 NOTE — PLAN OF CARE
Problem: OCCUPATIONAL THERAPY ADULT  Goal: Performs self-care activities at highest level of function for planned discharge setting  See evaluation for individualized goals  Description  Treatment Interventions: ADL retraining, Endurance training, Patient/family training, Equipment evaluation/education, Activityengagement, Energy conservation  Equipment Recommended: (pt reports having DME)       See flowsheet documentation for full assessment, interventions and recommendations  Note:   Limitation: Decreased ADL status, Decreased endurance, Decreased high-level ADLs     Assessment: Pt is a 50yo female [facility] on [facility]/2019  Pt presents w/ recurrent UTI and significant PMH impacting her occupational performance including DM, multiple myeloma, s/p kidney and pancreas transplant, L metatarsal amputation, R hallux valgus correction, HTN, lumbar radiculopathy, seizure  Pt reports living alone in apt w/ 3 JESSI PTA  Pt reports I w/ ADL PTA using walker vs cane for community mobility  Pt drives and reports local family assist w/ IADL as needed (laundry, heavy cleaning)  Upon eval, pt alert and oriented and motivated to return home and be able to get around w/ out assistance or fatigue  Pt completed bed mobility w/ mod I for HOB elevated  Pt completed sit <> stand w/ S and engaged in functional mobility w/ S short distances w/ in room and household distances using RW  Pt completed UBD w/ mod I after set-up and LBD w/ S  Pt presents w/ decreased activity tolerance, decreased endurance, decreased standing tolerance, decreased standing balance, generalized weakness /deconiditoning impacting her I w/ bathing, food prep / cleanup, clothing mgmt, oral hygiene, community mobility and functional mobility  Pt would benefit from OT while in acute care to address deficits and can return to PLOF w/ family support when medically stable for discharge from acute care   Will cotinue to follow     OT Discharge Recommendation: Home with family support  OT - OK to Discharge: (when medically stable)

## 2019-11-29 NOTE — SOCIAL WORK
SALTY spoke with Nika from [facility]  Patient's Skylar Garcia will cost $[phone]  [facility] currently placing prescription on hold until further follow up from CM  CM spoke with financial counselor to discuss other options for cost of Tedizolide  Waiting for callback

## 2019-11-29 NOTE — PROGRESS NOTES
Progress Note - Scottie Hernandez 1965, 53 y o  female MRN: [id number]    Unit/Bed#: S -01 Encounter: [id number]    Primary Care Provider: Kendy Luciano MD   Date and time admitted to hospital: 11/23/2019  2:47 PM        * Recurrent UTI  Assessment & Plan  Patient doing well  Urine culture growing enterococcus fecium VRE  Antibiotic changed to daptomycin based on sensitivity  Also sensitive to linezolid, nitrofurantoin  Would not be able to use nitrofurantoin  secondary to CKD 4  and linezolid contraindicated secondary to patient being on buspar  UTI secondary to bladder drainage of the exocrine pancreas  Not due to [place]   Currently on daptomycin daily 7  Will need to continue antibiotics for a 7 day course through 12 02/2019  Case management looking into option of Tedizolid as an oral option to insurance approves and cost is not prohibitive  Plan:  · Appreciate ID consult   · Continue daptomycin#4/7  · C/w immunosuppressives for renal transplant in 1998   · Urine culture: VRE Enterococcus fecium    Essential hypertension  Assessment & Plan  Blood pressure was elevated  181/74, 183/76 on admission     With increase in hydralazine to 75 mg t i d  Blood pressure now trending down to between 150 and 160  Anxiety might be playing a role-    Plan:  · Continue Norvasc 10 mg daily  · Continue clonidine 0 3 mg t i d   · Continue doxazosin 4 mg daily  · Increased hydralazine to 75 mg t i d   · hydralazine 5 mg injection p r n for SBP more than 160  · Appreciate Nephrology recommendations      Multiple myeloma not having achieved remission [facility])  Assessment & Plan  Patient with past medical history of multiple myeloma not having achieved remission  · On Revlimid  · Will follow up with Oncology upon discharge  Renal transplant recipient  Assessment & Plan  Renal and pancreatic transplant recipient in 1998  Stable    Plan:  · Continue tacrolimus and prednisone    Tacrolimus level is 3  2  · Monitor creatinine which is currently at 2 8-slight down trend from 3 2  Baseline creatinine of 2 5    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 [facility])  Assessment & Plan  Lab Results   Component Value Date    HGBA1C 5 1 09/09/2019       Recent Labs     11/28/19  1127 11/28/19  1608 11/28/19  2058 11/29/19  0717   POCGLU 125 126 109 117       Blood Sugar Average: Last 72 hrs:  (P) 116 [id number]   Lantus 5 units  Blood sugars remained stable at current regimen    Chronic kidney disease, stage 4 (severe) ([facility] [address] )  Assessment & Plan  Patient with past medical history of renal transplant in 1998  Patient's baseline creatinine is 2 4-2 8  On admission patient's creatinine was 3 15 has now trended down to 2 81 today  ·     Plan  · IVF  Appreciate nephrology input  · Monitor creatinine levels  Also with acidosis secondary to chronic bicarb loss  Since pancreatic exocrine secretions her drained into the bladder  Continue with bicarb  repletion    Anemia  Assessment & Plan  Patient with chronic kidney disease and as a result of chronic anemia  Hemoglobin 7 3 at baseline  · 11/25 patient's hemoglobin decreased to 6 6  Patient received 1 unit of PRBC and hemoglobin currently is around 8 1  Plan:  · Monitor hemoglobin    · Continue oral ferrous sulfate  ·     Ambulatory dysfunction  Assessment & Plan  Patient with generalized weakness and ambulatory dysfunction secondary to deconditioning from current condition  Will have PT and OT evaluate to see whether she is safe to be discharged home versus needing rehab  Anticipate DC onTuesday after completion of IV antibiotics  Subjective:    Patient without any complaints  Doing well on IV daptomycin  Day 4 of a 7 day course  See notes on difficulty in transitioning to oral antibiotics in a patient with enterococcus Fecium UTI--likely will complete IV antibiotic course here and be discharged on Tuesday      Physical Exam: Vitals: Blood pressure 162/54, pulse 55, temperature 98 2 °F (36 8 °C), temperature source Oral, resp  rate 18, weight 111 kg (243 lb 13 3 oz), SpO2 94 %  ,Body mass index is 37 07 kg/m²  Gen:  Pleasant, non-tachypnic, non-dyspnic  Conversant  Heart: regular rate and rhythm, S1S2 present, no murmur, rub or gallop  Lungs: clear to ausculatation bilaterally  No wheezing, crackless, or rhonchi  No accessory muscle use or respiratory distress  Abd: soft, non-tender, non-distended  NABS, no guarding, rebound or peritoneal signs  Extremities: no clubbing, cyanosis or edema  2+pedal pulses bilaterally  Full range of motion  Neuro: awake, alert and oriented  Cranial nerves 2-12 intact  Strength and sensation grossly intact  Skin: warm and dry: no petechiae, purpura and rash      LABS:   Results from last 7 days   Lab Units 11/29/19  0516 11/27/19  0611 11/26/19  0811 11/25/19  0459 11/24/19  0511   WBC Thousand/uL  --   --  5 73 5 39 6 48   HEMOGLOBIN g/dL 8 1* 7 8* 8 0* 6 6* 7 1*   HEMATOCRIT % 26 6* 25 1* 26 7* 22 1* 23 8*   PLATELETS Thousands/uL  --  145* 153 145* 147*     Results from last 7 days   Lab Units 11/28/19  0511 11/27/19  0454 11/26/19  0811   POTASSIUM mmol/L 4 0 4 0 4 3   CHLORIDE mmol/L 110* 111* 111*   CO2 mmol/L 18* 18* 18*   BUN mg/dL 44* 44* 46*   CREATININE mg/dL 2 81* 3 03* 3 20*   CALCIUM mg/dL 8 8 8 1* 8 1*       Intake/Output Summary (Last 24 hours) at 11/29/2019 1045  Last data filed at 11/29/2019 0910  Gross per 24 hour   Intake 180 ml   Output 1200 ml   Net -1020 ml           Current Facility-Administered Medications:  acetaminophen 650 mg Oral Q6H PRN Javy Parsons PA-C    amLODIPine 10 mg Oral QAM Chantal Guardado MD    ARIPiprazole 20 mg Oral HS Riya Coyne MD    aspirin 81 mg Oral Daily Riya Coyne MD    busPIRone 5 mg Oral BID Riya Coyne MD    cholecalciferol 1,000 Units Oral TID Riya Coyne MD    cloNIDine 0 3 mg Oral Q8H [address] Janny YANG Mary Lozada MD    DAPTOmycin 6 mg/kg (Adjusted) Intravenous Q48H Stiven Monterroso MD Last Rate: 500 mg (11/28/19 1122)   doxazosin 4 mg Oral HS Milana Luan, PA-C    DULoxetine 60 mg Oral BID Grady Ozuna MD    ferrous sulfate 325 mg Oral Daily With Breakfast Grady Ozuna MD    folic acid 3,570 mcg Oral Daily Grady Ozuna MD    heparin (porcine) 5,000 Units Subcutaneous [facility] Grady Ozuna MD    hydrALAZINE 5 mg Intravenous Q6H PRN Mendy Rojas MD    hydrALAZINE 100 mg Oral TID [facility], PA-C    insulin glargine 5 Units Subcutaneous HS Grady Ozuna MD    insulin lispro 1-6 Units Subcutaneous TID AC Javy Parsons, PA-C    insulin lispro 1-6 Units Subcutaneous HS Shawn Elmira Rubinstein, PA-C    levothyroxine 125 mcg Oral Early Morning Grady Ozuna MD    LORazepam 2 mg Oral TID PRN Grady Ozuna MD    metoprolol tartrate 50 mg Oral Q12H [facility], PA-C    ondansetron 4 mg Intravenous Q8H PRN Aysha Zhang PA-C    pravastatin 80 mg Oral Daily Grady Ozuna MD    predniSONE 5 mg Oral Daily Grady Ozuna MD    sertraline 200 mg Oral Daily Grady Ozuna MD    sevelamer 800 mg Oral TID With Meals Grady Ozuna MD    sodium bicarbonate 1,300 mg Oral TID Grady Ozuna MD    tacrolimus 2 mg Oral HS Kacy Michaels MD    tacrolimus 3 mg Oral Daily Kacy Michaels MD        Family update- updated father yesterday    Patient did not want me to update anybody today

## 2019-11-29 NOTE — PROGRESS NOTES
NEPHROLOGY PROGRESS NOTE    Zachary Dumont 53 y o  female MRN: [id number]  Unit/Bed#: S -01 Encounter: [id number]  Reason for Consult:  Acute on chronic kidney disease    The patient is sitting in a chair she is in good spirits says that she feels a little nauseous and might be due to her chemotherapy which she received about a week ago  She has had no vomiting and she is eating a little bit  She also reports that she is urinating without difficulty and is emptying her bladder  She has no other symptoms  ASSESSMENT/PLAN:  1  Renal    Patient acute on chronic kidney disease in her renal allograft her creatinine has declined back to her baseline of 2 8 yesterday  She was admitted with urosepsis and is on treatment for VRE with Infectious Disease following  The patient is on immunosuppression of tacrolimus and prednisone will continue with those medications for her renal transplant  As stated earlier bicarbonate is little bit low potentially due to renal insufficiency but mostly due to bicarbonate losses in the bladder as her pancreas transplant exocrine secretions are eliminated through the bladder  Her pancreas is no longer functional with respect to is endocrine function  Continue oral bicarbonate  No changes  Monitor labs and clinical status    2  Urinary tract infection    Patient has recurrent urinary tract infections being treated for VRE follow the outline for recommended care per Infectious Disease through 12/02/2019     3  Renal transplant recipient    As above patient's baseline creatinine is 2 8 continue with her current immunosuppression  SUBJECTIVE:  Review of Systems   Constitution: Negative for chills, decreased appetite, fever and malaise/fatigue  HENT: Negative  Eyes: Negative  Cardiovascular: Negative  Negative for chest pain, dyspnea on exertion, leg swelling and orthopnea  Respiratory: Negative    Negative for cough, shortness of breath, sputum production and wheezing  Gastrointestinal: Positive for nausea  Negative for bloating, abdominal pain, diarrhea and vomiting  Genitourinary: Negative for bladder incontinence, dysuria, hematuria and incomplete emptying  Neurological: Negative  Psychiatric/Behavioral: Negative  OBJECTIVE:  Current Weight: Weight - Scale: 111 kg (243 lb 13 3 oz)  Vitals:Temp (24hrs), Av 1 °F (36 7 °C), Min:97 9 °F (36 6 °C), Max:98 2 °F (36 8 °C)  Current: Temperature: 98 2 °F (36 8 °C)   Blood pressure 162/54, pulse 55, temperature 98 2 °F (36 8 °C), temperature source Oral, resp  rate 18, weight 111 kg (243 lb 13 3 oz), SpO2 94 %  , Body mass index is 37 07 kg/m²  Intake/Output Summary (Last 24 hours) at 2019 1113  Last data filed at 2019 1113  Gross per 24 hour   Intake 180 ml   Output 1300 ml   Net -1120 ml       Physical Exam: /54 (BP Location: Left arm)   Pulse 55   Temp 98 2 °F (36 8 °C) (Oral)   Resp 18   Wt 111 kg (243 lb 13 3 oz)   LMP  (LMP Unknown)   SpO2 94%   BMI 37 07 kg/m²   Physical Exam   Constitutional: She is oriented to person, place, and time  She appears well-nourished  No distress  HENT:   Head: Atraumatic  Eyes: EOM are normal  No scleral icterus  Neck: Neck supple  No JVD present  Cardiovascular: Normal rate and regular rhythm  Exam reveals no gallop and no friction rub  Pulmonary/Chest: Effort normal and breath sounds normal  No respiratory distress  She has no wheezes  She has no rales  Abdominal: Soft  Bowel sounds are normal  She exhibits no distension  There is no tenderness  There is no rebound  Neurological: She is alert and oriented to person, place, and time  Psychiatric: She has a normal mood and affect         Medications:    Current Facility-Administered Medications:     acetaminophen (TYLENOL) tablet 650 mg, 650 mg, Oral, Q6H PRN, Javy Parsons PA-C, 650 mg at 19 175    amLODIPine (NORVASC) tablet 10 mg, 10 mg, Oral, Humza HERNANDEZ Elizabeth Ames MD, 10 mg at 11/29/19 0859    ARIPiprazole (ABILIFY) tablet 20 mg, 20 mg, Oral, HS, Evin Juan MD, 20 mg at 11/28/19 2136    aspirin (ECOTRIN LOW STRENGTH) EC tablet 81 mg, 81 mg, Oral, Daily, Evin Juan MD, 81 mg at 11/29/19 0859    busPIRone (BUSPAR) tablet 5 mg, 5 mg, Oral, BID, Evin Juan MD, 5 mg at 11/29/19 0859    cholecalciferol (VITAMIN D3) tablet 1,000 Units, 1,000 Units, Oral, TID, Evin Juan MD, 1,000 Units at 11/29/19 0900    cloNIDine (CATAPRES) tablet 0 3 mg, 0 3 mg, Oral, Q8H [facility], Evin Juan MD, 0 3 mg at 11/29/19 0502    DAPTOmycin (CUBICIN) 500 mg in sodium chloride 0 9 % 50 mL IVPB, 6 mg/kg (Adjusted), Intravenous, Q48H, Stiven Monterroso MD, Last Rate: 100 mL/hr at 11/28/19 1122, 500 mg at 11/28/19 1122    doxazosin (CARDURA) tablet 4 mg, 4 mg, Oral, HS, [facility], 4 mg at 11/28/19 2137    DULoxetine (CYMBALTA) delayed release capsule 60 mg, 60 mg, Oral, BID, Evin Juan MD, 60 mg at 11/29/19 1469    ferrous sulfate tablet 325 mg, 325 mg, Oral, Daily With Breakfast, Evin Juan MD, 325 mg at 02/23/27 2777    folic acid (FOLVITE) tablet 1,000 mcg, 1,000 mcg, Oral, Daily, Evin Juan MD, 1,000 mcg at 11/29/19 0859    heparin (porcine) subcutaneous injection 5,000 Units, 5,000 Units, Subcutaneous, Q8H [facility], 5,000 Units at 11/29/19 0502 **AND** [COMPLETED] Platelet count, , , Once, Evin Juan MD    hydrALAZINE (APRESOLINE) injection 5 mg, 5 mg, Intravenous, Q6H PRN, Tomasa Lomeli MD, 5 mg at 11/29/19 1014    hydrALAZINE (APRESOLINE) tablet 100 mg, 100 mg, Oral, TID, [facility], PA-C, 100 mg at 11/29/19 0900    insulin glargine (LANTUS) subcutaneous injection 5 Units 0 05 mL, 5 Units, Subcutaneous, HS, Evin Juan MD, 5 Units at 11/28/19 2137    insulin lispro (HumaLOG) 100 units/mL subcutaneous injection 1-6 Units, 1-6 Units, Subcutaneous, TID AC, 1 Units at 11/24/19 1710 **AND** Fingerstick Glucose (POCT), , , TID AC, Javy Parsons PA-C    insulin lispro (HumaLOG) 100 units/mL subcutaneous injection 1-6 Units, 1-6 Units, Subcutaneous, HS, Javy Parsons PA-C, Stopped at 11/23/19 2147    levothyroxine tablet 125 mcg, 125 mcg, Oral, Early Morning, Kanwal Constantino MD, 125 mcg at 11/29/19 0502    LORazepam (ATIVAN) tablet 2 mg, 2 mg, Oral, TID PRN, Kanwal Constantino MD, 2 mg at 11/27/19 1713    metoprolol tartrate (LOPRESSOR) tablet 50 mg, 50 mg, Oral, Q12H [address], [facility], JOHN, 50 mg at 11/29/19 0859    ondansetron (ZOFRAN) injection 4 mg, 4 mg, Intravenous, Q8H PRN, Valentín Montiel PA-C, 4 mg at 11/28/19 2131    pravastatin (PRAVACHOL) tablet 80 mg, 80 mg, Oral, Daily, Kanwal Constantino MD, 80 mg at 11/29/19 0900    predniSONE tablet 5 mg, 5 mg, Oral, Daily, Kanwal Constantino MD, 5 mg at 11/29/19 0859    sertraline (ZOLOFT) tablet 200 mg, 200 mg, Oral, Daily, Kanwal Constantino MD, 200 mg at 11/29/19 0900    sevelamer (RENAGEL) tablet 800 mg, 800 mg, Oral, TID With Meals, Kanwal Constantino MD, 800 mg at 11/29/19 0901    sodium bicarbonate tablet 1,300 mg, 1,300 mg, Oral, TID, Kanwal Constantino MD, 1,300 mg at 11/29/19 0900    tacrolimus (PROGRAF) capsule 2 mg, 2 mg, Oral, HS, Gloria Bower MD, 2 mg at 11/28/19 2136    tacrolimus (PROGRAF) capsule 3 mg, 3 mg, Oral, Daily, Gloria Bower MD, 3 mg at 11/29/19 0859    Laboratory Results:  Lab Results   Component Value Date    WBC 5 73 11/26/2019    HGB 8 1 (L) 11/29/2019    HCT 26 6 (L) 11/29/2019     (H) 11/26/2019     (L) 11/27/2019     Lab Results   Component Value Date    SODIUM 141 11/28/2019    K 4 0 11/28/2019     (H) 11/28/2019    CO2 18 (L) 11/28/2019    BUN 44 (H) 11/28/2019    CREATININE 2 81 (H) 11/28/2019    GLUC 113 11/28/2019    CALCIUM 8 8 11/28/2019     Lab Results   Component Value Date    CALCIUM 8 8 11/28/2019    PHOS 4 7 (H) 11/28/2019     No results found for: LABPROT

## 2019-11-29 NOTE — SOCIAL WORK
CM received voice message from Lalit Leo with financial counselor  Patient's poverty line is 272  Patient is eligible for a 100% discount  SALTY reviewed with clinical coordinator SALTY and SLIM  CM clinical coordinator reviewing with CM manager  CM spoke with patient and father at the bedside  Patient and father updated cost of medications is $[phone]  Patient and father stated they cannot afford cost of medication  Patient is on a fixed income  Patient and father open to the idea of discharging home on oral antibiotic if able to be provided assistance with cost of medication  CM will f/u with CM clinical coordinator and SLIM

## 2019-11-29 NOTE — PLAN OF CARE
Problem: PHYSICAL THERAPY ADULT  Goal: Performs mobility at highest level of function for planned discharge setting  See evaluation for individualized goals  Description  Treatment/Interventions: Functional transfer training, Elevations, LE strengthening/ROM, Therapeutic exercise, Endurance training, Bed mobility, Gait training, Patient/family training, Cognitive reorientation, Equipment eval/education, Spoke to nursing, OT  Equipment Recommended: Hua Reynolds       See flowsheet documentation for full assessment, interventions and recommendations  Note:   Prognosis: Fair  Problem List: Decreased strength, Decreased range of motion, Decreased endurance, Impaired balance, Decreased mobility, Decreased coordination, Impaired sensation, Obesity(gait deviations)  Assessment: Pt is a 53 y o  female seen for PT evaluation s/p admit to [facility] on 11/23/2019 w/ Recurrent UTI  Order placed for PT  Upon evaluation: Pt requires S assistance for transfers and ambulation with rolling walker in room without her orthopedic shoes  Pt's clinical presentation is currently unstable/unpredictable given the functional mobility deficits above, especially weakness, gait deviations, decreased activity tolerance and decreased functional mobility tolerance, coupled with fall risks including hx of falls, impaired balance and impaired coordination, and combined with medical complications of abnormal renal lab values, abnormal H&H and multiple readmissions  Pt to benefit from continued skilled PT tx while in hospital and upon DC to address deficits as defined above and maximize level of functional mobility  From PT/mobility standpoint, recommendation at time of d/c would be Home PT and home with family support pending progress and stairs trial for JESSI in order to maximize pt's functional independence and consistency w/ mobility in order to facilitate return to PLOF    Recommend trial with walker next 1-2 sessions and case management consult  Barriers to Discharge: Inaccessible home environment  Barriers to Discharge Comments: Comorbidities affecting pt's physical performance at time of assessment include: DM, HTN, limited vision, cancer history and/or treatment and EDUARDO, toe amputation, seizure, renal and pancreas transplant Personal factors affecting pt at time of IE include: steps to enter environment, past experience, inability to perform IADLs and recent fall(s)  Recommendation: Home with family support, Home PT          See flowsheet documentation for full assessment

## 2019-11-29 NOTE — ASSESSMENT & PLAN NOTE
Renal and pancreatic transplant recipient in 1998  Stable    Plan:  · Continue tacrolimus and prednisone  Tacrolimus level is 3 2  · Monitor creatinine which is currently at 2 8-slight down trend from 3 2   Baseline creatinine of 2 5

## 2019-11-29 NOTE — ASSESSMENT & PLAN NOTE
Last seen 8/10/18. Last seen 11/12/18. Patient with past medical history of multiple myeloma not having achieved remission  · On Revlimid  · Will follow up with Oncology upon discharge

## 2019-11-29 NOTE — SOCIAL WORK
CM updated per ID, patient recommended to continue IV ABX Daptomycin until 12/2/2019  CM updated patient not approved for financial assistance with p o  ABX  CM spoke with patient and father at the bedside  Patient and father updated per ID, the recommendation is to continue with current IV ABX regimen until 12/2/2019  Patient understands plan of care  Patient updated Yessye Copier is able to accept for resumption of care at discharge  Patient stated her father or mother are able to transport at time of discharge  No other questions/concerns at this time

## 2019-11-29 NOTE — PLAN OF CARE
Problem: Potential for Falls  Goal: Patient will remain free of falls  Description  INTERVENTIONS:  - Assess patient frequently for physical needs  -  Identify cognitive and physical deficits and behaviors that affect risk of falls    -  [place] fall precautions as indicated by assessment   - Educate patient/family on patient safety including physical limitations  - Instruct patient to call for assistance with activity based on assessment  - Modify environment to reduce risk of injury  - Consider OT/PT consult to assist with strengthening/mobility  Outcome: Progressing     Problem: PAIN - ADULT  Goal: Verbalizes/displays adequate comfort level or baseline comfort level  Description  Interventions:  - Encourage patient to monitor pain and request assistance  - Assess pain using appropriate pain scale  - Administer analgesics based on type and severity of pain and evaluate response  - Implement non-pharmacological measures as appropriate and evaluate response  - Consider cultural and social influences on pain and pain management  - Notify physician/advanced practitioner if interventions unsuccessful or patient reports new pain  Outcome: Progressing     Problem: INFECTION - ADULT  Goal: Absence or prevention of progression during hospitalization  Description  INTERVENTIONS:  - Assess and monitor for signs and symptoms of infection  - Monitor lab/diagnostic results  - Monitor all insertion sites, i e  indwelling lines, tubes, and drains  - Monitor endotracheal if appropriate and nasal secretions for changes in amount and color  - [place] appropriate cooling/warming therapies per order  - Administer medications as ordered  - Instruct and encourage patient and family to use good hand hygiene technique  - Identify and instruct in appropriate isolation precautions for identified infection/condition  Outcome: Progressing     Problem: GENITOURINARY - ADULT  Goal: Maintains or returns to baseline urinary function  Description  INTERVENTIONS:  - Assess urinary function  - Encourage oral fluids to ensure adequate hydration if ordered  - Administer IV fluids as ordered to ensure adequate hydration  - Administer ordered medications as needed  - Offer frequent toileting  - Follow urinary retention protocol if ordered  Outcome: Progressing  Goal: Absence of urinary retention  Description  INTERVENTIONS:  - Assess patients ability to void and empty bladder  - Monitor I/O  - Bladder scan as needed  - Discuss with physician/AP medications to alleviate retention as needed  - Discuss catheterization for long term situations as appropriate  Outcome: Progressing

## 2019-11-30 LAB
ANION GAP SERPL CALCULATED.3IONS-SCNC: 12 MMOL/L (ref 4–13)
BUN SERPL-MCNC: 41 MG/DL (ref 5–25)
CALCIUM SERPL-MCNC: 9 MG/DL (ref 8.3–10.1)
CHLORIDE SERPL-SCNC: 110 MMOL/L (ref 100–108)
CO2 SERPL-SCNC: 18 MMOL/L (ref 21–32)
CREAT SERPL-MCNC: 2.71 MG/DL (ref 0.6–1.3)
GFR SERPL CREATININE-BSD FRML MDRD: 19 ML/MIN/1.73SQ M
GLUCOSE SERPL-MCNC: 103 MG/DL (ref 65–140)
GLUCOSE SERPL-MCNC: 104 MG/DL (ref 65–140)
GLUCOSE SERPL-MCNC: 104 MG/DL (ref 65–140)
GLUCOSE SERPL-MCNC: 115 MG/DL (ref 65–140)
GLUCOSE SERPL-MCNC: 126 MG/DL (ref 65–140)
HCT VFR BLD AUTO: 26.1 % (ref 34.8–46.1)
HGB BLD-MCNC: 7.9 G/DL (ref 11.5–15.4)
POTASSIUM SERPL-SCNC: 4.4 MMOL/L (ref 3.5–5.3)
SODIUM SERPL-SCNC: 140 MMOL/L (ref 136–145)

## 2019-11-30 PROCEDURE — 99232 SBSQ HOSP IP/OBS MODERATE 35: CPT | Performed by: INTERNAL MEDICINE

## 2019-11-30 PROCEDURE — 85018 HEMOGLOBIN: CPT | Performed by: HOSPITALIST

## 2019-11-30 PROCEDURE — 85014 HEMATOCRIT: CPT | Performed by: HOSPITALIST

## 2019-11-30 PROCEDURE — 99232 SBSQ HOSP IP/OBS MODERATE 35: CPT | Performed by: HOSPITALIST

## 2019-11-30 PROCEDURE — 80048 BASIC METABOLIC PNL TOTAL CA: CPT | Performed by: HOSPITALIST

## 2019-11-30 PROCEDURE — 82948 REAGENT STRIP/BLOOD GLUCOSE: CPT

## 2019-11-30 RX ADMIN — HEPARIN SODIUM 5000 UNITS: 5000 INJECTION INTRAVENOUS; SUBCUTANEOUS at 22:31

## 2019-11-30 RX ADMIN — ARIPIPRAZOLE 20 MG: 5 TABLET ORAL at 22:28

## 2019-11-30 RX ADMIN — AMLODIPINE BESYLATE 10 MG: 10 TABLET ORAL at 08:43

## 2019-11-30 RX ADMIN — LEVOTHYROXINE SODIUM 125 MCG: 125 TABLET ORAL at 05:45

## 2019-11-30 RX ADMIN — DULOXETINE HYDROCHLORIDE 60 MG: 60 CAPSULE, DELAYED RELEASE ORAL at 17:05

## 2019-11-30 RX ADMIN — BISACODYL 10 MG: 5 TABLET, COATED ORAL at 08:44

## 2019-11-30 RX ADMIN — DOXAZOSIN 4 MG: 4 TABLET ORAL at 22:30

## 2019-11-30 RX ADMIN — SODIUM BICARBONATE 650 MG TABLET 1300 MG: at 17:04

## 2019-11-30 RX ADMIN — CHOLECALCIFEROL TAB 25 MCG (1000 UNIT) 1000 UNITS: 25 TAB at 22:30

## 2019-11-30 RX ADMIN — DAPTOMYCIN 500 MG: 500 INJECTION, POWDER, LYOPHILIZED, FOR SOLUTION INTRAVENOUS at 11:40

## 2019-11-30 RX ADMIN — LORAZEPAM 2 MG: 1 TABLET ORAL at 17:08

## 2019-11-30 RX ADMIN — CLONIDINE HYDROCHLORIDE 0.3 MG: 0.1 TABLET ORAL at 22:28

## 2019-11-30 RX ADMIN — FOLIC ACID 1000 MCG: 1 TABLET ORAL at 08:44

## 2019-11-30 RX ADMIN — TACROLIMUS 2 MG: 1 CAPSULE ORAL at 22:31

## 2019-11-30 RX ADMIN — PRAVASTATIN SODIUM 80 MG: 80 TABLET ORAL at 08:43

## 2019-11-30 RX ADMIN — HEPARIN SODIUM 5000 UNITS: 5000 INJECTION INTRAVENOUS; SUBCUTANEOUS at 05:45

## 2019-11-30 RX ADMIN — BUSPIRONE HYDROCHLORIDE 5 MG: 5 TABLET ORAL at 17:04

## 2019-11-30 RX ADMIN — HYDRALAZINE HYDROCHLORIDE 100 MG: 25 TABLET ORAL at 08:42

## 2019-11-30 RX ADMIN — SEVELAMER HYDROCHLORIDE 800 MG: 800 TABLET, FILM COATED PARENTERAL at 08:44

## 2019-11-30 RX ADMIN — SODIUM BICARBONATE 650 MG TABLET 1300 MG: at 08:43

## 2019-11-30 RX ADMIN — HEPARIN SODIUM 5000 UNITS: 5000 INJECTION INTRAVENOUS; SUBCUTANEOUS at 13:04

## 2019-11-30 RX ADMIN — ASPIRIN 81 MG: 81 TABLET, COATED ORAL at 08:43

## 2019-11-30 RX ADMIN — HYDRALAZINE HYDROCHLORIDE 100 MG: 25 TABLET ORAL at 22:29

## 2019-11-30 RX ADMIN — SERTRALINE HYDROCHLORIDE 200 MG: 100 TABLET ORAL at 08:42

## 2019-11-30 RX ADMIN — SEVELAMER HYDROCHLORIDE 800 MG: 800 TABLET, FILM COATED PARENTERAL at 17:10

## 2019-11-30 RX ADMIN — SEVELAMER HYDROCHLORIDE 800 MG: 800 TABLET, FILM COATED PARENTERAL at 13:04

## 2019-11-30 RX ADMIN — CLONIDINE HYDROCHLORIDE 0.3 MG: 0.1 TABLET ORAL at 05:45

## 2019-11-30 RX ADMIN — METOPROLOL TARTRATE 50 MG: 50 TABLET, FILM COATED ORAL at 08:43

## 2019-11-30 RX ADMIN — SODIUM BICARBONATE 650 MG TABLET 1300 MG: at 22:27

## 2019-11-30 RX ADMIN — HYDRALAZINE HYDROCHLORIDE 100 MG: 25 TABLET ORAL at 17:04

## 2019-11-30 RX ADMIN — TACROLIMUS 3 MG: 1 CAPSULE ORAL at 08:42

## 2019-11-30 RX ADMIN — BUSPIRONE HYDROCHLORIDE 5 MG: 5 TABLET ORAL at 08:43

## 2019-11-30 RX ADMIN — PREDNISONE 5 MG: 5 TABLET ORAL at 08:43

## 2019-11-30 RX ADMIN — LORAZEPAM 2 MG: 1 TABLET ORAL at 10:45

## 2019-11-30 RX ADMIN — DULOXETINE HYDROCHLORIDE 60 MG: 60 CAPSULE, DELAYED RELEASE ORAL at 08:44

## 2019-11-30 RX ADMIN — CLONIDINE HYDROCHLORIDE 0.3 MG: 0.1 TABLET ORAL at 13:04

## 2019-11-30 RX ADMIN — INSULIN GLARGINE 5 UNITS: 100 INJECTION, SOLUTION SUBCUTANEOUS at 22:31

## 2019-11-30 RX ADMIN — CHOLECALCIFEROL TAB 25 MCG (1000 UNIT) 1000 UNITS: 25 TAB at 17:04

## 2019-11-30 RX ADMIN — CHOLECALCIFEROL TAB 25 MCG (1000 UNIT) 1000 UNITS: 25 TAB at 08:43

## 2019-11-30 RX ADMIN — FERROUS SULFATE TAB 325 MG (65 MG ELEMENTAL FE) 325 MG: 325 (65 FE) TAB at 08:43

## 2019-11-30 NOTE — ASSESSMENT & PLAN NOTE
Renal and pancreatic transplant recipient in 1998  Stable    Plan:  · Continue tacrolimus and prednisone  Tacrolimus level is 3 2-which is what his transplant team wants the level to be  · Monitor creatinine which is currently at 2 7- down  from 3 2 on admission    Baseline creatinine of 2 5

## 2019-11-30 NOTE — ASSESSMENT & PLAN NOTE
Patient with chronic kidney disease and as a result of chronic anemia  Hemoglobin 7 3 at baseline  · 11/25 patient's hemoglobin decreased to 6 6  Patient received 1 unit of PRBC and hemoglobin currently is around 7 9  Plan:  · Monitor hemoglobin      · Continue oral ferrous sulfate  ·

## 2019-11-30 NOTE — PLAN OF CARE
Problem: Potential for Falls  Goal: Patient will remain free of falls  Description  INTERVENTIONS:  - Assess patient frequently for physical needs  -  Identify cognitive and physical deficits and behaviors that affect risk of falls    -  [place] fall precautions as indicated by assessment   - Educate patient/family on patient safety including physical limitations  - Instruct patient to call for assistance with activity based on assessment  - Modify environment to reduce risk of injury  - Consider OT/PT consult to assist with strengthening/mobility  Outcome: Progressing     Problem: PAIN - ADULT  Goal: Verbalizes/displays adequate comfort level or baseline comfort level  Description  Interventions:  - Encourage patient to monitor pain and request assistance  - Assess pain using appropriate pain scale  - Administer analgesics based on type and severity of pain and evaluate response  - Implement non-pharmacological measures as appropriate and evaluate response  - Consider cultural and social influences on pain and pain management  - Notify physician/advanced practitioner if interventions unsuccessful or patient reports new pain  Outcome: Progressing     Problem: INFECTION - ADULT  Goal: Absence or prevention of progression during hospitalization  Description  INTERVENTIONS:  - Assess and monitor for signs and symptoms of infection  - Monitor lab/diagnostic results  - Monitor all insertion sites, i e  indwelling lines, tubes, and drains  - Monitor endotracheal if appropriate and nasal secretions for changes in amount and color  - [place] appropriate cooling/warming therapies per order  - Administer medications as ordered  - Instruct and encourage patient and family to use good hand hygiene technique  - Identify and instruct in appropriate isolation precautions for identified infection/condition  Outcome: Progressing     Problem: GENITOURINARY - ADULT  Goal: Maintains or returns to baseline urinary function  Description  INTERVENTIONS:  - Assess urinary function  - Encourage oral fluids to ensure adequate hydration if ordered  - Administer IV fluids as ordered to ensure adequate hydration  - Administer ordered medications as needed  - Offer frequent toileting  - Follow urinary retention protocol if ordered  Outcome: Progressing  Goal: Absence of urinary retention  Description  INTERVENTIONS:  - Assess patients ability to void and empty bladder  - Monitor I/O  - Bladder scan as needed  - Discuss with physician/AP medications to alleviate retention as needed  - Discuss catheterization for long term situations as appropriate  Outcome: Progressing

## 2019-11-30 NOTE — ASSESSMENT & PLAN NOTE
Patient with past medical history of renal transplant in 1998  Patient's baseline creatinine is 2 4-2 8  On admission patient's creatinine was 3 15 has now trended down to 2 81 today  ·     Plan  ·   Appreciate nephrology input  · Monitor creatinine levels  Also with acidosis secondary to chronic bicarb loss  Since pancreatic exocrine secretions being drained into the bladder    Continue with bicarb  repletion

## 2019-11-30 NOTE — ASSESSMENT & PLAN NOTE
Lab Results   Component Value Date    HGBA1C 5 1 09/09/2019       Recent Labs     11/29/19  1053 11/29/19  1601 11/29/19  2121 11/30/19  0708   POCGLU 131 134 117 115       Blood Sugar Average: Last 72 hrs:  (P) 119 [id number]   Lantus 5 units     Blood sugars remained stable at current regimen

## 2019-11-30 NOTE — ASSESSMENT & PLAN NOTE
Patient with generalized weakness and ambulatory dysfunction secondary to deconditioning from current condition  PT OT alberta glover  Recommended home with home PT  Anticipate DC onTuesday after completion of IV antibiotics

## 2019-11-30 NOTE — PROGRESS NOTES
Progress Note - Patrick Judit 1965, 53 y o  female MRN: [id number]    Unit/Bed#: S -01 Encounter: [id number]    Primary Care Provider: Alisa Martinez MD   Date and time admitted to hospital: 11/23/2019  2:47 PM        * Recurrent UTI  Assessment & Plan  Patient doing well  Urine culture growing enterococcus fecium VRE  On daptomycin based on sensitivity  Also sensitive to linezolid, nitrofurantoin  Would not be able to use nitrofurantoin  secondary to CKD 4    linezolid contraindicated secondary to patient being on buspar  UTI secondary to bladder drainage of the exocrine pancreas  Not due to [place]   Currently on daptomycin #5  Will need to continue antibiotics for a 7 day course through 12 02/2019  Tedizolid considered as an oral option -however upon discussion with Nephrology and pharmacology it was deemed that IV daptomycin had better penetration and activity against VRE Enterococcus Fecium in a transplanted kidney patient    Plan:  · Appreciate ID consult   · Continue daptomycin#4/7  · C/w immunosuppressives for renal transplant in 1998   · Urine culture: VRE Enterococcus fecium    Essential hypertension  Assessment & Plan  Blood pressure was elevated  181/74, 183/76 on admission     With increase in hydralazine to 75 mg t i d  Blood pressure now trending down to between 150 and 160  Anxiety might be playing a role-    Plan:  · Continue Norvasc 10 mg daily  · Continue clonidine 0 3 mg t i d   · Continue doxazosin 4 mg daily  · Increased hydralazine to 75 mg t i d   · hydralazine 5 mg injection p r n for SBP more than 160  · Appreciate Nephrology recommendations      Multiple myeloma not having achieved remission [facility])  Assessment & Plan  Patient with past medical history of multiple myeloma not having achieved remission  · On Revlimid  · Will follow up with Oncology upon discharge  Renal transplant recipient  Assessment & Plan  Renal and pancreatic transplant recipient in 1998  Stable    Plan:  · Continue tacrolimus and prednisone  Tacrolimus level is 3 2-which is what his transplant team wants the level to be  · Monitor creatinine which is currently at 2 7- down  from 3 2 on admission    Baseline creatinine of 2 5    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 [facility])  Assessment & Plan  Lab Results   Component Value Date    HGBA1C 5 1 09/09/2019       Recent Labs     11/29/19  1053 11/29/19  1601 11/29/19  2121 11/30/19  0708   POCGLU 131 134 117 115       Blood Sugar Average: Last 72 hrs:  (P) 119 [id number]   Lantus 5 units  Blood sugars remained stable at current regimen    Chronic kidney disease, stage 4 (severe) ([facility] [address] )  Assessment & Plan  Patient with past medical history of renal transplant in 1998  Patient's baseline creatinine is 2 4-2 8  On admission patient's creatinine was 3 15 has now trended down to 2 81 today  ·     Plan  ·   Appreciate nephrology input  · Monitor creatinine levels  Also with acidosis secondary to chronic bicarb loss  Since pancreatic exocrine secretions being drained into the bladder  Continue with bicarb  repletion    Anemia  Assessment & Plan  Patient with chronic kidney disease and as a result of chronic anemia  Hemoglobin 7 3 at baseline  · 11/25 patient's hemoglobin decreased to 6 6  Patient received 1 unit of PRBC and hemoglobin currently is around 7 9  Plan:  · Monitor hemoglobin    · Continue oral ferrous sulfate  ·     Ambulatory dysfunction  Assessment & Plan  Patient with generalized weakness and ambulatory dysfunction secondary to deconditioning from current condition  PT GIORGI glover  Recommended home with home PT  Anticipate DC onTuesday after completion of IV antibiotics  Subjective:    Patient without any complaints  On day 5 of daptomycin    Initial plan was to change to Tedizolid however after discussion with Nephrology and pharmacology it was deemed that IV daptomycin would wear off for better penetration and coverage  Patient will state till she completes her antibiotics on Monday and will likely be discharged home on Tuesday    Physical Exam:   Vitals: Blood pressure 154/58, pulse 55, temperature 98 4 °F (36 9 °C), temperature source Oral, resp  rate 18, weight 110 kg (241 lb 6 5 oz), SpO2 92 %  ,Body mass index is 36 71 kg/m²  Gen:  Pleasant, non-tachypnic, non-dyspnic  Conversant  Heart: regular rate and rhythm, S1S2 present, no murmur, rub or gallop  Lungs: clear to ausculatation bilaterally  No wheezing, crackless, or rhonchi  No accessory muscle use or respiratory distress  Abd: soft, non-tender, non-distended  NABS, no guarding, rebound or peritoneal signs  Extremities: no clubbing, cyanosis or edema  2+pedal pulses bilaterally  Full range of motion  Neuro: awake, alert and oriented  Cranial nerves 2-12 intact  Strength and sensation grossly intact  Skin: warm and dry: no petechiae, purpura and rash      LABS:   Results from last 7 days   Lab Units 11/30/19  0442 11/29/19  0516 11/27/19  0611 11/26/19  0811 11/25/19  0459 11/24/19  0511   WBC Thousand/uL  --   --   --  5 73 5 39 6 48   HEMOGLOBIN g/dL 7 9* 8 1* 7 8* 8 0* 6 6* 7 1*   HEMATOCRIT % 26 1* 26 6* 25 1* 26 7* 22 1* 23 8*   PLATELETS Thousands/uL  --   --  145* 153 145* 147*     Results from last 7 days   Lab Units 11/30/19  0442 11/28/19  0511 11/27/19  0454   POTASSIUM mmol/L 4 4 4 0 4 0   CHLORIDE mmol/L 110* 110* 111*   CO2 mmol/L 18* 18* 18*   BUN mg/dL 41* 44* 44*   CREATININE mg/dL 2 71* 2 81* 3 03*   CALCIUM mg/dL 9 0 8 8 8 1*       Intake/Output Summary (Last 24 hours) at 11/30/2019 0811  Last data filed at 11/30/2019 0250  Gross per 24 hour   Intake 720 ml   Output 1450 ml   Net -730 ml           Current Facility-Administered Medications:  acetaminophen 650 mg Oral Q6H PRN Javy Parsons PA-C    amLODIPine 10 mg Oral QAM Chantal Guardado MD    ARIPiprazole 20 mg Oral POONAM YANG Josselin Golden MD    aspirin 81 mg Oral Daily Wei Mazariegos MD    bisacodyl 10 mg Oral Daily PRN Sabrina Cortés MD    busPIRone 5 mg Oral BID Wei Mazariegos MD    cholecalciferol 1,000 Units Oral TID Wei Mazariegos MD    cloNIDine 0 3 mg Oral [facility] Wei Mazariegos MD    DAPTOmycin 6 mg/kg (Adjusted) Intravenous Q48H Stiven Monterroso MD Last Rate: 500 mg (11/28/19 1122)   doxazosin 4 mg Oral HS Milana Roland PA-C    DULoxetine 60 mg Oral BID Wei Mazariegos MD    ferrous sulfate 325 mg Oral Daily With Breakfast Wei Mazariegos MD    folic acid 6,762 mcg Oral Daily Wei Mazariegos MD    heparin (porcine) 5,000 Units Subcutaneous [facility] Wei Mazariegos MD    hydrALAZINE 5 mg Intravenous Q6H PRN Phill Wynn MD    hydrALAZINE 100 mg Oral TID Belia Marquez PA-C    insulin glargine 5 Units Subcutaneous HS Wei Mazariegos MD    insulin lispro 1-6 Units Subcutaneous TID AC Javy Parsons PA-C    insulin lispro 1-6 Units Subcutaneous HS Javy Parsons PA-C    lactulose 20 g Oral Daily PRN Sabrina Cortés MD    levothyroxine 125 mcg Oral Early Morning Wei Mazariegos MD    LORazepam 2 mg Oral TID PRN Wei Mazariegos MD    metoprolol tartrate 50 mg Oral Q12H [facility], [facility]    ondansetron 4 mg Intravenous Q8H PRN Viv Rodríguez PA-C    pravastatin 80 mg Oral Daily Wei Mazariegos MD    predniSONE 5 mg Oral Daily Wei Mazariegos MD    promethazine 25 mg Intramuscular Q6H PRN Javy Parsons PA-C    sertraline 200 mg Oral Daily Wei Mazariegos MD    sevelamer 800 mg Oral TID With Meals Wei Mazariegos MD    sodium bicarbonate 1,300 mg Oral TID Wei Mazariegos MD    tacrolimus 2 mg Oral HS Eda Chatman MD    tacrolimus 3 mg Oral Daily Eda Chatman MD

## 2019-11-30 NOTE — ASSESSMENT & PLAN NOTE
Patient doing well  Urine culture growing enterococcus fecium VRE  On daptomycin based on sensitivity  Also sensitive to linezolid, nitrofurantoin  Would not be able to use nitrofurantoin  secondary to CKD 4    linezolid contraindicated secondary to patient being on buspar  UTI secondary to bladder drainage of the exocrine pancreas  Not due to [place]   Currently on daptomycin #5  Will need to continue antibiotics for a 7 day course through 12 02/2019  Tedizolid considered as an oral option -however upon discussion with Nephrology and pharmacology it was deemed that IV daptomycin had better penetration and activity against VRE Enterococcus Fecium in a transplanted kidney patient    Plan:  · Appreciate ID consult   · Continue daptomycin#4/7  · C/w immunosuppressives for renal transplant in 1998   · Urine culture: VRE Enterococcus fecium

## 2019-11-30 NOTE — PROGRESS NOTES
NEPHROLOGY PROGRESS NOTE    Bigg Cummings 53 y o  female MRN: [id number]  Unit/Bed#: S -01 Encounter: [id number]  Reason for Consult:  Acute on chronic kidney disease in renal allograft    Patient is doing well occasional nausea other than that no fevers no chills she feels she is urinating well  ASSESSMENT/PLAN:  1  Renal    Patient had acute on chronic kidney disease in her renal allograft due to urosepsis  Creatinine now has declined is improving and is near baseline  She will continue with her immunosuppression of tacrolimus and prednisone  The patient seems to be emptying her bladder well and has not had large postvoid residuals so for the time being her previous bout of urine retention [place] catheterization requirement it seems resolved  The patient is chronically low bicarb and it is likely mostly related to bicarb losses in the urine because her prior pancreas transplant which no longer functions with respect to endocrine function will still excrete bicarbonate and it drains into the bladder  She is on oral supplementation  Continue current immunosuppression    2  Recurrent urinary tract infection    The patient is clinically improved is receiving IV antibiotics and has oral antibiotic plans per Infectious Disease  3  History of multiple myeloma  Received chemotherapy about 1 week ago  SUBJECTIVE:  Review of Systems   Constitution: Negative for chills, decreased appetite, fever and malaise/fatigue  HENT: Negative  Eyes: Negative  Cardiovascular: Negative  Negative for chest pain, dyspnea on exertion, leg swelling and orthopnea  Respiratory: Negative  Negative for cough, shortness of breath, sputum production and wheezing  Gastrointestinal: Positive for nausea  Negative for bloating, abdominal pain, diarrhea and vomiting  Occasional nausea  Genitourinary: Negative for bladder incontinence, dysuria, hematuria and incomplete emptying  Neurological: Negative  Psychiatric/Behavioral: Negative  OBJECTIVE:  Current Weight: Weight - Scale: 110 kg (241 lb 6 5 oz)  Vitals:Temp (24hrs), Av 2 °F (36 8 °C), Min:98 °F (36 7 °C), Max:98 4 °F (36 9 °C)  Current: Temperature: 98 4 °F (36 9 °C)   Blood pressure 154/58, pulse 55, temperature 98 4 °F (36 9 °C), temperature source Oral, resp  rate 18, weight 110 kg (241 lb 6 5 oz), SpO2 92 %  , Body mass index is 36 71 kg/m²  Intake/Output Summary (Last 24 hours) at 2019 1054  Last data filed at 2019 0250  Gross per 24 hour   Intake 540 ml   Output 1450 ml   Net -910 ml       Physical Exam: /58 (BP Location: Left arm)   Pulse 55   Temp 98 4 °F (36 9 °C) (Oral)   Resp 18   Wt 110 kg (241 lb 6 5 oz)   LMP  (LMP Unknown)   SpO2 92%   BMI 36 71 kg/m²   Physical Exam   Constitutional: She is oriented to person, place, and time  She appears well-nourished  No distress  HENT:   Head: Atraumatic  Mouth/Throat: Oropharynx is clear and moist    Eyes: EOM are normal  No scleral icterus  Neck: Neck supple  No JVD present  Cardiovascular: Normal rate and regular rhythm  Exam reveals no gallop and no friction rub  Pulmonary/Chest: Effort normal and breath sounds normal  No respiratory distress  She has no wheezes  She has no rales  Abdominal: Soft  Bowel sounds are normal  She exhibits no distension  There is no tenderness  There is no rebound  Nontender renal allograft   Neurological: She is alert and oriented to person, place, and time  Psychiatric: She has a normal mood and affect         Medications:    Current Facility-Administered Medications:     acetaminophen (TYLENOL) tablet 650 mg, 650 mg, Oral, Q6H PRN, Javy Parsons PA-C, 650 mg at 19 1756    amLODIPine (NORVASC) tablet 10 mg, 10 mg, Oral, QAM, Genie Dukes MD, 10 mg at 19 0843    ARIPiprazole (ABILIFY) tablet 20 mg, 20 mg, Oral, HS, Jamel Cogan, MD, 20 mg at 19 2147    aspirin (ECOTRIN LOW STRENGTH) EC tablet 81 mg, 81 mg, Oral, Daily, Eleanor Rodriguez MD, 81 mg at 11/30/19 0843    bisacodyl (DULCOLAX) EC tablet 10 mg, 10 mg, Oral, Daily PRN, Jaquan Aquino MD, 10 mg at 11/30/19 0844    busPIRone (BUSPAR) tablet 5 mg, 5 mg, Oral, BID, Eleanor Rodriguez MD, 5 mg at 11/30/19 0843    cholecalciferol (VITAMIN D3) tablet 1,000 Units, 1,000 Units, Oral, TID, Eleanor Rodriguez MD, 1,000 Units at 11/30/19 0843    cloNIDine (CATAPRES) tablet 0 3 mg, 0 3 mg, Oral, Q8H [address], Eleanor Rodriguez MD, 0 3 mg at 11/30/19 0545    DAPTOmycin (CUBICIN) 500 mg in sodium chloride 0 9 % 50 mL IVPB, 6 mg/kg (Adjusted), Intravenous, Q48H, Stiven Zeineddine, MD, Last Rate: 100 mL/hr at 11/28/19 1122, 500 mg at 11/28/19 1122    doxazosin (CARDURA) tablet 4 mg, 4 mg, Oral, HS, [facility], [facility], 4 mg at 11/29/19 2146    DULoxetine (CYMBALTA) delayed release capsule 60 mg, 60 mg, Oral, BID, Eleanor Rodriguez MD, 60 mg at 11/30/19 6840    ferrous sulfate tablet 325 mg, 325 mg, Oral, Daily With Breakfast, Eleanor Rodriguez MD, 325 mg at 47/18/65 3086    folic acid (FOLVITE) tablet 1,000 mcg, 1,000 mcg, Oral, Daily, Eleanor Rodriguez MD, 1,000 mcg at 11/30/19 0844    heparin (porcine) subcutaneous injection 5,000 Units, 5,000 Units, Subcutaneous, Q8H [address], 5,000 Units at 11/30/19 0545 **AND** [COMPLETED] Platelet count, , , Once, Eleanor Rodriguez MD    hydrALAZINE (APRESOLINE) injection 5 mg, 5 mg, Intravenous, Q6H PRN, Vanesa Subramanian MD, 5 mg at 11/29/19 7659    hydrALAZINE (APRESOLINE) tablet 100 mg, 100 mg, Oral, TID, [facility], PA-CELIA, 100 mg at 11/30/19 0842    insulin glargine (LANTUS) subcutaneous injection 5 Units 0 05 mL, 5 Units, Subcutaneous, HS, Eleanor Rodriguez MD, 5 Units at 11/29/19 2150    insulin lispro (HumaLOG) 100 units/mL subcutaneous injection 1-6 Units, 1-6 Units, Subcutaneous, TID AC, 1 Units at 11/24/19 1710 **AND** Fingerstick Glucose (POCT), , , TID AC, Javy Munoz PA-C   insulin lispro (HumaLOG) 100 units/mL subcutaneous injection 1-6 Units, 1-6 Units, Subcutaneous, HS, Javy Parsons PA-C, Stopped at 11/23/19 2147    lactulose 20 g/30 mL oral solution 20 g, 20 g, Oral, Daily PRN, Sean Campo MD    levothyroxine tablet 125 mcg, 125 mcg, Oral, Early Morning, Krystal Woodall MD, 125 mcg at 11/30/19 0545    LORazepam (ATIVAN) tablet 2 mg, 2 mg, Oral, TID PRN, Krystal Woodall MD, 2 mg at 11/30/19 1045    metoprolol tartrate (LOPRESSOR) tablet 50 mg, 50 mg, Oral, Q12H [facility], Belia Marquez PA-C, 50 mg at 11/30/19 0843    ondansetron (ZOFRAN) injection 4 mg, 4 mg, Intravenous, Q8H PRN, Severiano Guan, PA-C, 4 mg at 11/29/19 1307    pravastatin (PRAVACHOL) tablet 80 mg, 80 mg, Oral, Daily, Krystal Woodall MD, 80 mg at 11/30/19 0843    predniSONE tablet 5 mg, 5 mg, Oral, Daily, Krystal Woodall MD, 5 mg at 11/30/19 0843    promethazine (PHENERGAN) injection 25 mg, 25 mg, Intramuscular, Q6H PRN, Javy Parsons PA-C    sertraline (ZOLOFT) tablet 200 mg, 200 mg, Oral, Daily, Krystal Woodall MD, 200 mg at 11/30/19 1562    sevelamer (RENAGEL) tablet 800 mg, 800 mg, Oral, TID With Meals, Krystal Woodall MD, 800 mg at 11/30/19 0844    sodium bicarbonate tablet 1,300 mg, 1,300 mg, Oral, TID, Krystal Woodall MD, 1,300 mg at 11/30/19 0843    tacrolimus (PROGRAF) capsule 2 mg, 2 mg, Oral, HS, Tara Arenas MD, 2 mg at 11/29/19 2145    tacrolimus (PROGRAF) capsule 3 mg, 3 mg, Oral, Daily, Tara Arenas MD, 3 mg at 11/30/19 0842    Laboratory Results:  Lab Results   Component Value Date    WBC 5 73 11/26/2019    HGB 7 9 (L) 11/30/2019    HCT 26 1 (L) 11/30/2019     (H) 11/26/2019     (L) 11/27/2019     Lab Results   Component Value Date    SODIUM 140 11/30/2019    K 4 4 11/30/2019     (H) 11/30/2019    CO2 18 (L) 11/30/2019    BUN 41 (H) 11/30/2019    CREATININE 2 71 (H) 11/30/2019    GLUC 104 11/30/2019    CALCIUM 9 0 11/30/2019     Lab Results   Component Value Date    CALCIUM 9 0 11/30/2019    PHOS 4 7 (H) 11/28/2019     No results found for: LABPROT

## 2019-12-01 LAB
GLUCOSE SERPL-MCNC: 113 MG/DL (ref 65–140)
GLUCOSE SERPL-MCNC: 138 MG/DL (ref 65–140)
GLUCOSE SERPL-MCNC: 144 MG/DL (ref 65–140)
GLUCOSE SERPL-MCNC: 158 MG/DL (ref 65–140)

## 2019-12-01 PROCEDURE — 99232 SBSQ HOSP IP/OBS MODERATE 35: CPT | Performed by: HOSPITALIST

## 2019-12-01 PROCEDURE — 82948 REAGENT STRIP/BLOOD GLUCOSE: CPT

## 2019-12-01 PROCEDURE — 99232 SBSQ HOSP IP/OBS MODERATE 35: CPT | Performed by: INTERNAL MEDICINE

## 2019-12-01 RX ORDER — LIDOCAINE 50 MG/G
1 PATCH TOPICAL DAILY
Status: DISCONTINUED | OUTPATIENT
Start: 2019-12-01 | End: 2019-12-06 | Stop reason: HOSPADM

## 2019-12-01 RX ORDER — SACCHAROMYCES BOULARDII 250 MG
250 CAPSULE ORAL 2 TIMES DAILY
Status: DISCONTINUED | OUTPATIENT
Start: 2019-12-01 | End: 2019-12-06 | Stop reason: HOSPADM

## 2019-12-01 RX ORDER — DICYCLOMINE HYDROCHLORIDE 10 MG/1
10 CAPSULE ORAL
Status: DISCONTINUED | OUTPATIENT
Start: 2019-12-01 | End: 2019-12-01

## 2019-12-01 RX ADMIN — ASPIRIN 81 MG: 81 TABLET, COATED ORAL at 08:17

## 2019-12-01 RX ADMIN — CLONIDINE HYDROCHLORIDE 0.3 MG: 0.1 TABLET ORAL at 05:30

## 2019-12-01 RX ADMIN — METOPROLOL TARTRATE 50 MG: 50 TABLET, FILM COATED ORAL at 22:01

## 2019-12-01 RX ADMIN — PREDNISONE 5 MG: 5 TABLET ORAL at 08:17

## 2019-12-01 RX ADMIN — Medication 250 MG: at 10:32

## 2019-12-01 RX ADMIN — DICYCLOMINE HYDROCHLORIDE 10 MG: 10 CAPSULE ORAL at 15:00

## 2019-12-01 RX ADMIN — HYDRALAZINE HYDROCHLORIDE 100 MG: 25 TABLET ORAL at 08:16

## 2019-12-01 RX ADMIN — DICYCLOMINE HYDROCHLORIDE 10 MG: 10 CAPSULE ORAL at 14:58

## 2019-12-01 RX ADMIN — SERTRALINE HYDROCHLORIDE 200 MG: 100 TABLET ORAL at 08:16

## 2019-12-01 RX ADMIN — SODIUM BICARBONATE 650 MG TABLET 1300 MG: at 21:59

## 2019-12-01 RX ADMIN — HYDRALAZINE HYDROCHLORIDE 100 MG: 25 TABLET ORAL at 17:16

## 2019-12-01 RX ADMIN — ARIPIPRAZOLE 20 MG: 5 TABLET ORAL at 22:00

## 2019-12-01 RX ADMIN — SODIUM BICARBONATE 650 MG TABLET 1300 MG: at 08:17

## 2019-12-01 RX ADMIN — CHOLECALCIFEROL TAB 25 MCG (1000 UNIT) 1000 UNITS: 25 TAB at 08:16

## 2019-12-01 RX ADMIN — METOPROLOL TARTRATE 50 MG: 50 TABLET, FILM COATED ORAL at 08:16

## 2019-12-01 RX ADMIN — HEPARIN SODIUM 5000 UNITS: 5000 INJECTION INTRAVENOUS; SUBCUTANEOUS at 22:01

## 2019-12-01 RX ADMIN — FOLIC ACID 1000 MCG: 1 TABLET ORAL at 08:16

## 2019-12-01 RX ADMIN — DICYCLOMINE HYDROCHLORIDE 10 MG: 10 CAPSULE ORAL at 22:00

## 2019-12-01 RX ADMIN — HEPARIN SODIUM 5000 UNITS: 5000 INJECTION INTRAVENOUS; SUBCUTANEOUS at 13:18

## 2019-12-01 RX ADMIN — DULOXETINE HYDROCHLORIDE 60 MG: 60 CAPSULE, DELAYED RELEASE ORAL at 17:15

## 2019-12-01 RX ADMIN — HEPARIN SODIUM 5000 UNITS: 5000 INJECTION INTRAVENOUS; SUBCUTANEOUS at 05:30

## 2019-12-01 RX ADMIN — DOXAZOSIN 4 MG: 4 TABLET ORAL at 22:00

## 2019-12-01 RX ADMIN — SEVELAMER HYDROCHLORIDE 800 MG: 800 TABLET, FILM COATED PARENTERAL at 17:17

## 2019-12-01 RX ADMIN — PRAVASTATIN SODIUM 80 MG: 80 TABLET ORAL at 08:17

## 2019-12-01 RX ADMIN — HYDRALAZINE HYDROCHLORIDE 100 MG: 25 TABLET ORAL at 22:00

## 2019-12-01 RX ADMIN — INSULIN GLARGINE 5 UNITS: 100 INJECTION, SOLUTION SUBCUTANEOUS at 22:01

## 2019-12-01 RX ADMIN — BUSPIRONE HYDROCHLORIDE 5 MG: 5 TABLET ORAL at 08:16

## 2019-12-01 RX ADMIN — LEVOTHYROXINE SODIUM 125 MCG: 125 TABLET ORAL at 05:30

## 2019-12-01 RX ADMIN — SODIUM BICARBONATE 650 MG TABLET 1300 MG: at 17:15

## 2019-12-01 RX ADMIN — AMLODIPINE BESYLATE 10 MG: 10 TABLET ORAL at 08:17

## 2019-12-01 RX ADMIN — CHOLECALCIFEROL TAB 25 MCG (1000 UNIT) 1000 UNITS: 25 TAB at 17:15

## 2019-12-01 RX ADMIN — INSULIN LISPRO 1 UNITS: 100 INJECTION, SOLUTION INTRAVENOUS; SUBCUTANEOUS at 16:14

## 2019-12-01 RX ADMIN — CHOLECALCIFEROL TAB 25 MCG (1000 UNIT) 1000 UNITS: 25 TAB at 22:00

## 2019-12-01 RX ADMIN — LIDOCAINE 1 PATCH: 50 PATCH TOPICAL at 14:53

## 2019-12-01 RX ADMIN — TACROLIMUS 2 MG: 1 CAPSULE ORAL at 22:00

## 2019-12-01 RX ADMIN — BUSPIRONE HYDROCHLORIDE 5 MG: 5 TABLET ORAL at 17:15

## 2019-12-01 RX ADMIN — CLONIDINE HYDROCHLORIDE 0.3 MG: 0.1 TABLET ORAL at 13:18

## 2019-12-01 RX ADMIN — SEVELAMER HYDROCHLORIDE 800 MG: 800 TABLET, FILM COATED PARENTERAL at 13:17

## 2019-12-01 RX ADMIN — DULOXETINE HYDROCHLORIDE 60 MG: 60 CAPSULE, DELAYED RELEASE ORAL at 08:18

## 2019-12-01 RX ADMIN — ONDANSETRON 4 MG: 2 INJECTION INTRAMUSCULAR; INTRAVENOUS at 11:37

## 2019-12-01 RX ADMIN — SEVELAMER HYDROCHLORIDE 800 MG: 800 TABLET, FILM COATED PARENTERAL at 08:16

## 2019-12-01 RX ADMIN — CLONIDINE HYDROCHLORIDE 0.3 MG: 0.1 TABLET ORAL at 22:00

## 2019-12-01 RX ADMIN — TACROLIMUS 3 MG: 1 CAPSULE ORAL at 08:17

## 2019-12-01 RX ADMIN — LORAZEPAM 2 MG: 1 TABLET ORAL at 13:22

## 2019-12-01 RX ADMIN — HYDRALAZINE HYDROCHLORIDE 5 MG: 20 INJECTION INTRAMUSCULAR; INTRAVENOUS at 10:06

## 2019-12-01 RX ADMIN — FERROUS SULFATE TAB 325 MG (65 MG ELEMENTAL FE) 325 MG: 325 (65 FE) TAB at 08:18

## 2019-12-01 RX ADMIN — Medication 250 MG: at 17:16

## 2019-12-01 NOTE — NURSING NOTE
Pt  Blood pressure fluctuating between systolic 735L-440T  Hydralazine PRN administered as per order parameters  Updated SLIM via amion  Will continue to monitor

## 2019-12-01 NOTE — ASSESSMENT & PLAN NOTE
Lab Results   Component Value Date    HGBA1C 5 1 09/09/2019       Recent Labs     11/30/19  1101 11/30/19  1650 11/30/19  2202 12/01/19  0704   POCGLU 103 126 104 113       Blood Sugar Average: Last 72 hrs:  (P) 117 [id number]   Lantus 5 units     Blood sugars remained stable at current regimen

## 2019-12-01 NOTE — PROGRESS NOTES
Progress Note - Chi Morales 1965, 53 y o  female MRN: [id number]    Unit/Bed#: S -01 Encounter: [id number]    Primary Care Provider: Mike Gross MD   Date and time admitted to hospital: 11/23/2019  2:47 PM        * Recurrent UTI  Assessment & Plan  Patient doing well however complaining of some loose stools per day  No maya diarrhea abdominal pain of fever     Urine culture growing enterococcus fecium VRE  On daptomycin based on sensitivity  Also sensitive to linezolid, nitrofurantoin  Would not be able to use nitrofurantoin  secondary to CKD 4    linezolid contraindicated secondary to patient being on buspar  UTI secondary to bladder drainage of the exocrine pancreas  Not due to Moreno   Currently on daptomycin #6  Will need to continue antibiotics for a 7 day course through 12 02/2019  Tedizolid considered as an oral option -however upon discussion with Nephrology and pharmacology it was deemed that IV daptomycin had better penetration and activity against VRE Enterococcus Fecium in a transplanted kidney patient    Plan:  · Appreciate ID consult   · Continue daptomycin#6/7  · C/w immunosuppressives for renal transplant in 1998   · Urine culture: VRE Enterococcus fecium  · Will continue to monitor stools  If diarrhea will need to send for C diff  Currently patient states semi-solid stools and she is on stool softeners    Essential hypertension  Assessment & Plan  Blood pressure was elevated  181/74, 183/76 on admission     With increase in hydralazine to 75 mg t i d  Blood pressure now trending down to between 150 and 160  Anxiety might be playing a role-    Plan:  · Continue Norvasc 10 mg daily  · Continue clonidine 0 3 mg t i d   · Continue doxazosin 4 mg daily  · Increased hydralazine to 75 mg t i d   · hydralazine 5 mg injection p r n for SBP more than 160    · Appreciate Nephrology recommendations      Multiple myeloma not having achieved remission [facility])  Assessment & Plan  Patient with past medical history of multiple myeloma not having achieved remission  · On Revlimid  · Will follow up with Oncology upon discharge  Renal transplant recipient  Assessment & Plan  Renal and pancreatic transplant recipient in 1998  Stable    Plan:  · Continue tacrolimus and prednisone  Tacrolimus level is 3 2-which is what his transplant team wants the level to be  · Monitor creatinine which is currently at 2 7- down  from 3 2 on admission    Baseline creatinine of 2 5    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 [facility])  Assessment & Plan  Lab Results   Component Value Date    HGBA1C 5 1 09/09/2019       Recent Labs     11/30/19  1101 11/30/19  1650 11/30/19  2202 12/01/19  0704   POCGLU 103 126 104 113       Blood Sugar Average: Last 72 hrs:  (P) 117 [id number]   Lantus 5 units  Blood sugars remained stable at current regimen    Chronic kidney disease, stage 4 (severe) ([facility] [address] )  Assessment & Plan  Patient with past medical history of renal transplant in 1998  Patient's baseline creatinine is 2 4-2 8  On admission patient's creatinine was 3 15 has now trended down to 2 81 today  ·     Plan  ·   Appreciate nephrology input  · Monitor creatinine levels  Also with acidosis secondary to chronic bicarb loss  Since pancreatic exocrine secretions being drained into the bladder  Continue with bicarb  repletion    Ambulatory dysfunction  Assessment & Plan  Patient with generalized weakness and ambulatory dysfunction secondary to deconditioning from current condition  PT GIORGI glover  Recommended home with home PT  Anticipate DC onTuesday after completion of IV antibiotics  Subjective:    Physical Exam:   Vitals: Blood pressure (!) 188/52, pulse 56, temperature 98 °F (36 7 °C), temperature source Oral, resp  rate 18, weight 110 kg (242 lb 15 2 oz), SpO2 93 %  ,Body mass index is 36 94 kg/m²  Gen:  Pleasant, non-tachypnic, non-dyspnic  Conversant  Heart: regular rate and rhythm, S1S2 present, no murmur, rub or gallop  Lungs: clear to ausculatation bilaterally  No wheezing, crackless, or rhonchi  No accessory muscle use or respiratory distress  Abd: soft, non-tender, non-distended  NABS, no guarding, rebound or peritoneal signs  Extremities: no clubbing, cyanosis or edema  2+pedal pulses bilaterally  Full range of motion  Neuro: awake, alert and oriented  Cranial nerves 2-12 intact  Strength and sensation grossly intact  Skin: warm and dry: no petechiae, purpura and rash      LABS:   Results from last 7 days   Lab Units 11/30/19  0442 11/29/19  0516 11/27/19  0611 11/26/19  0811 11/25/19  0459   WBC Thousand/uL  --   --   --  5 73 5 39   HEMOGLOBIN g/dL 7 9* 8 1* 7 8* 8 0* 6 6*   HEMATOCRIT % 26 1* 26 6* 25 1* 26 7* 22 1*   PLATELETS Thousands/uL  --   --  145* 153 145*     Results from last 7 days   Lab Units 11/30/19 0442 11/28/19  0511 11/27/19  0454   POTASSIUM mmol/L 4 4 4 0 4 0   CHLORIDE mmol/L 110* 110* 111*   CO2 mmol/L 18* 18* 18*   BUN mg/dL 41* 44* 44*   CREATININE mg/dL 2 71* 2 81* 3 03*   CALCIUM mg/dL 9 0 8 8 8 1*       Intake/Output Summary (Last 24 hours) at 12/1/2019 1035  Last data filed at 12/1/2019 0700  Gross per 24 hour   Intake 960 ml   Output 1150 ml   Net -190 ml           Current Facility-Administered Medications:  acetaminophen 650 mg Oral Q6H PRN Javy Parsons PA-C    amLODIPine 10 mg Oral QAM Karma Brennan MD    ARIPiprazole 20 mg Oral HS Walt Valente MD    aspirin 81 mg Oral Daily Walt Valente MD    bisacodyl 10 mg Oral Daily PRN Anastacia Cardenas MD    busPIRone 5 mg Oral BID Walt Valente MD    cholecalciferol 1,000 Units Oral TID Walt Valente MD    cloNIDine 0 3 mg Oral [facility] Walt Valente MD    DAPTOmycin 6 mg/kg (Adjusted) Intravenous Q48H Stiven MD Loc Last Rate: 500 mg (11/30/19 1140)   doxazosin 4 mg Oral HS Milana Roland PA-C    DULoxetine 60 mg Oral BID Riya Coyne MD    ferrous sulfate 325 mg Oral Daily With Breakfast Riya Coyne MD    folic acid 0,364 mcg Oral Daily Riya Coyne MD    heparin (porcine) 5,000 Units Subcutaneous [facility] Riya Coyne MD    hydrALAZINE 5 mg Intravenous Q6H PRN [place] Kemp MD    hydrALAZINE 100 mg Oral TID [facility], PA-C    insulin glargine 5 Units Subcutaneous HS Riya Coyne MD    insulin lispro 1-6 Units Subcutaneous TID AC Javy Parsons PA-C    insulin lispro 1-6 Units Subcutaneous HS Javy Conway PA-C    levothyroxine 125 mcg Oral Early Morning Riya Coyne MD    LORazepam 2 mg Oral TID PRN Riya Coyne MD    metoprolol tartrate 50 mg Oral Q12H [facility], PA-C    ondansetron 4 mg Intravenous Q8H PRN Danii Cali PA-C    pravastatin 80 mg Oral Daily Riya Coyne MD    predniSONE 5 mg Oral Daily Riya Coyne MD    promethazine 25 mg Intramuscular Q6H PRN Javy Parsons, PA-C    saccharomyces boulardii 250 mg Oral BID Marcela Diaz MD    sertraline 200 mg Oral Daily Riya Coyne MD    sevelamer 800 mg Oral TID With Meals Riya Coyne MD    sodium bicarbonate 1,300 mg Oral TID Riya Coyne MD    tacrolimus 2 mg Oral HS Chantal Guardado MD    tacrolimus 3 mg Oral Daily Chantal Guardado MD

## 2019-12-01 NOTE — PROGRESS NOTES
NEPHROLOGY PROGRESS NOTE    Clare Obando 53 y o  female MRN: [id number]  Unit/Bed#: S -01 Encounter: [id number]  Reason for Consult:  Acute on chronic kidney disease and renal transplant  Patient is awake and alert  She looks stable no distress but today she is complaining of diarrhea  She is also having urgency to go to have a bowel movement sometimes going in and nothing is coming out she is having lot cramps in her abdomen and low back pain  ASSESSMENT/PLAN:  1  Renal    Patient acute renal failure on chronic kidney disease with baseline creatinine 2 8 and her renal allograft  She is followed by 1 of my partners in the office and her immunosuppression consists of tacrolimus and prednisone which he is continuing in her tacrolimus trough level was at the range that he desires of around 3 - 5  No changes in these medications renal function has returned back to baseline  Continue current immunosuppressive medications  Monitor labs and volume status    2  Urinary tract infection    Patient has had recurrent urinary tract infections this infection grew VRE infectious Disease is monitoring and giving daptomycin and made recommendations for care through 12/02/2019  Infectious Disease following  Of note the patient had some urine retention last admission at [facility] with Moreno catheter placed  At home she was attempting to do straight catheterizations and was not able to do it and came in this time with urinary tract infection  During this hospitalizations postvoid residuals have been acceptable so no catheterizations have been done  3  Low bicarbonate    The patient is on oral supplementation she will have chronic bicarb losses from her pancreatic drainage of exocrine secretions into the bladder  On oral supplementation  4  Hypertension    Patient's blood pressure is not optimal she is on medications clonidine metoprolol amlodipine doxazosin    This morning her blood pressures been elevated but she was having back pain and discomfort so could be related to that  Will monitor over the next 24 hours before adjusting her adding new medications as she is on multiple drug regimen  SUBJECTIVE:  Review of Systems   Constitution: Negative for chills, diaphoresis, fever and night sweats  HENT: Negative  Eyes: Negative  Cardiovascular: Negative  Negative for chest pain, dyspnea on exertion, orthopnea and palpitations  Respiratory: Negative  Negative for cough, shortness of breath, sputum production and wheezing  Musculoskeletal: Positive for back pain  Gastrointestinal: Positive for diarrhea  Negative for bloating, nausea and vomiting  Abdominal cramps  Genitourinary: Negative for bladder incontinence, dysuria and hematuria  Feels he is emptying her bladder well  Neurological: Negative  Psychiatric/Behavioral: Negative  OBJECTIVE:  Current Weight: Weight - Scale: 110 kg (242 lb 15 2 oz)  Vitals:Temp (24hrs), Av 1 °F (36 7 °C), Min:98 °F (36 7 °C), Max:98 3 °F (36 8 °C)  Current: Temperature: 98 °F (36 7 °C)   Blood pressure 158/56, pulse 56, temperature 98 °F (36 7 °C), temperature source Oral, resp  rate 18, weight 110 kg (242 lb 15 2 oz), SpO2 93 %  , Body mass index is 36 94 kg/m²  Intake/Output Summary (Last 24 hours) at 2019 0959  Last data filed at 2019 0700  Gross per 24 hour   Intake 960 ml   Output 1150 ml   Net -190 ml       Physical Exam: /56 (BP Location: Left arm)   Pulse 56   Temp 98 °F (36 7 °C) (Oral)   Resp 18   Wt 110 kg (242 lb 15 2 oz)   LMP  (LMP Unknown)   SpO2 93%   BMI 36 94 kg/m²   Physical Exam   Constitutional: She is oriented to person, place, and time  She appears well-nourished  No distress  HENT:   Head: Atraumatic  Mouth/Throat: Oropharynx is clear and moist    Eyes: EOM are normal  No scleral icterus  Neck: Normal range of motion  Neck supple  No JVD present     Cardiovascular: Normal rate and regular rhythm  Exam reveals no gallop and no friction rub  Pulmonary/Chest: Effort normal and breath sounds normal  No respiratory distress  She has no wheezes  She has no rales  Abdominal: Soft  Bowel sounds are normal  She exhibits no distension  There is no tenderness  There is no rebound  Neurological: She is alert and oriented to person, place, and time  Psychiatric: She has a normal mood and affect         Medications:    Current Facility-Administered Medications:     acetaminophen (TYLENOL) tablet 650 mg, 650 mg, Oral, Q6H PRN, Javy Parsons PA-C, 650 mg at 11/24/19 1756    amLODIPine (NORVASC) tablet 10 mg, 10 mg, Oral, QAM, Cachorro Donald MD, 10 mg at 12/01/19 0817    ARIPiprazole (ABILIFY) tablet 20 mg, 20 mg, Oral, HS, Cristi Elam MD, 20 mg at 11/30/19 2228    aspirin (ECOTRIN LOW STRENGTH) EC tablet 81 mg, 81 mg, Oral, Daily, Cristi Elam MD, 81 mg at 12/01/19 0817    bisacodyl (DULCOLAX) EC tablet 10 mg, 10 mg, Oral, Daily PRN, Sandra Higgins MD, 10 mg at 11/30/19 0844    busPIRone (BUSPAR) tablet 5 mg, 5 mg, Oral, BID, Cristi Elam MD, 5 mg at 12/01/19 0816    cholecalciferol (VITAMIN D3) tablet 1,000 Units, 1,000 Units, Oral, TID, Cristi Elam MD, 1,000 Units at 12/01/19 0816    cloNIDine (CATAPRES) tablet 0 3 mg, 0 3 mg, Oral, Q8H [facility], Cristi Elam MD, 0 3 mg at 12/01/19 0530    DAPTOmycin (CUBICIN) 500 mg in sodium chloride 0 9 % 50 mL IVPB, 6 mg/kg (Adjusted), Intravenous, Q48H, Stiven Monterroso MD, Last Rate: 100 mL/hr at 11/30/19 1140, 500 mg at 11/30/19 1140    doxazosin (CARDURA) tablet 4 mg, 4 mg, Oral, HS, [facility], JOHN, 4 mg at 11/30/19 2230    DULoxetine (CYMBALTA) delayed release capsule 60 mg, 60 mg, Oral, BID, Cristi Elam MD, 60 mg at 12/01/19 0818    ferrous sulfate tablet 325 mg, 325 mg, Oral, Daily With Breakfast, Cristi Elam MD, 325 mg at 96/95/72 8022    folic acid (FOLVITE) tablet 1,000 mcg, 1,000 mcg, Oral, Daily, Etienne Gibson MD, 1,000 mcg at 12/01/19 0816    heparin (porcine) subcutaneous injection 5,000 Units, 5,000 Units, Subcutaneous, Q8H [address], 5,000 Units at 12/01/19 0530 **AND** [COMPLETED] Platelet count, , , Once, Etienne Gibson MD    hydrALAZINE (APRESOLINE) injection 5 mg, 5 mg, Intravenous, Q6H PRN, Ruthie Pryor MD, 5 mg at 11/29/19 1609    hydrALAZINE (APRESOLINE) tablet 100 mg, 100 mg, Oral, TID, [facility], PA-C, 100 mg at 12/01/19 0816    insulin glargine (LANTUS) subcutaneous injection 5 Units 0 05 mL, 5 Units, Subcutaneous, HS, Etienne Gibson MD, 5 Units at 11/30/19 2231    insulin lispro (HumaLOG) 100 units/mL subcutaneous injection 1-6 Units, 1-6 Units, Subcutaneous, TID AC, 1 Units at 11/24/19 1710 **AND** Fingerstick Glucose (POCT), , , TID AC, Javy Parsons PA-C    insulin lispro (HumaLOG) 100 units/mL subcutaneous injection 1-6 Units, 1-6 Units, Subcutaneous, HS, Javy Parsons PA-C, Stopped at 11/23/19 2147    lactulose 20 g/30 mL oral solution 20 g, 20 g, Oral, Daily PRN, Jones Mead MD    levothyroxine tablet 125 mcg, 125 mcg, Oral, Early Morning, Etienne Gibson MD, 125 mcg at 12/01/19 0530    LORazepam (ATIVAN) tablet 2 mg, 2 mg, Oral, TID PRN, Etienne Gibson MD, 2 mg at 11/30/19 1708    metoprolol tartrate (LOPRESSOR) tablet 50 mg, 50 mg, Oral, Q12H [address], [facility], PA-C, 50 mg at 12/01/19 0816    ondansetron (ZOFRAN) injection 4 mg, 4 mg, Intravenous, Q8H PRN, GISEL ChackoC, 4 mg at 11/29/19 1307    pravastatin (PRAVACHOL) tablet 80 mg, 80 mg, Oral, Daily, Etienne Gibson MD, 80 mg at 12/01/19 1547    predniSONE tablet 5 mg, 5 mg, Oral, Daily, Etienne Gibson MD, 5 mg at 12/01/19 0817    promethazine (PHENERGAN) injection 25 mg, 25 mg, Intramuscular, Q6H PRN, Yovanny Brown PA-C    sertraline (ZOLOFT) tablet 200 mg, 200 mg, Oral, Daily, Etienne Gibson MD, 200 mg at 12/01/19 0816    sevelamer (RENAGEL) tablet 800 mg, 800 mg, Oral, TID With Meals, Kayleigh You MD, 800 mg at 12/01/19 0816    sodium bicarbonate tablet 1,300 mg, 1,300 mg, Oral, TID, Kayleigh You MD, 1,300 mg at 12/01/19 0817    tacrolimus (PROGRAF) capsule 2 mg, 2 mg, Oral, HS, Chinita Castleman, MD, 2 mg at 11/30/19 2231    tacrolimus (PROGRAF) capsule 3 mg, 3 mg, Oral, Daily, Chinita Castleman, MD, 3 mg at 12/01/19 0817    Laboratory Results:  Lab Results   Component Value Date    WBC 5 73 11/26/2019    HGB 7 9 (L) 11/30/2019    HCT 26 1 (L) 11/30/2019     (H) 11/26/2019     (L) 11/27/2019     Lab Results   Component Value Date    SODIUM 140 11/30/2019    K 4 4 11/30/2019     (H) 11/30/2019    CO2 18 (L) 11/30/2019    BUN 41 (H) 11/30/2019    CREATININE 2 71 (H) 11/30/2019    GLUC 104 11/30/2019    CALCIUM 9 0 11/30/2019     Lab Results   Component Value Date    CALCIUM 9 0 11/30/2019    PHOS 4 7 (H) 11/28/2019     No results found for: LABPROT

## 2019-12-01 NOTE — ASSESSMENT & PLAN NOTE
Patient doing well however complaining of some loose stools per day  No maya diarrhea abdominal pain of fever     Urine culture growing enterococcus fecium VRE  On daptomycin based on sensitivity  Also sensitive to linezolid, nitrofurantoin  Would not be able to use nitrofurantoin  secondary to CKD 4    linezolid contraindicated secondary to patient being on buspar  UTI secondary to bladder drainage of the exocrine pancreas  Not due to Moreno   Currently on daptomycin #6  Will need to continue antibiotics for a 7 day course through 12 02/2019  Tedizolid considered as an oral option -however upon discussion with Nephrology and pharmacology it was deemed that IV daptomycin had better penetration and activity against VRE Enterococcus Fecium in a transplanted kidney patient    Plan:  · Appreciate ID consult   · Continue daptomycin#6/7  · C/w immunosuppressives for renal transplant in 1998   · Urine culture: VRE Enterococcus fecium  · Will continue to monitor stools  If diarrhea will need to send for C diff    Currently patient states semi-solid stools and she is on stool softeners

## 2019-12-02 ENCOUNTER — APPOINTMENT (INPATIENT)
Dept: CT IMAGING | Facility: HOSPITAL | Age: 54
DRG: 689 | End: 2019-12-02
Payer: MEDICARE

## 2019-12-02 LAB
ALBUMIN SERPL BCP-MCNC: 2.8 G/DL (ref 3.5–5)
ALP SERPL-CCNC: 87 U/L (ref 46–116)
ALT SERPL W P-5'-P-CCNC: 12 U/L (ref 12–78)
ANION GAP SERPL CALCULATED.3IONS-SCNC: 12 MMOL/L (ref 4–13)
AST SERPL W P-5'-P-CCNC: 10 U/L (ref 5–45)
BASOPHILS # BLD AUTO: 0.12 THOUSANDS/ΜL (ref 0–0.1)
BASOPHILS NFR BLD AUTO: 2 % (ref 0–1)
BILIRUB SERPL-MCNC: 0.47 MG/DL (ref 0.2–1)
BUN SERPL-MCNC: 47 MG/DL (ref 5–25)
CALCIUM SERPL-MCNC: 9 MG/DL (ref 8.3–10.1)
CHLORIDE SERPL-SCNC: 108 MMOL/L (ref 100–108)
CO2 SERPL-SCNC: 19 MMOL/L (ref 21–32)
CREAT SERPL-MCNC: 2.64 MG/DL (ref 0.6–1.3)
EOSINOPHIL # BLD AUTO: 0.01 THOUSAND/ΜL (ref 0–0.61)
EOSINOPHIL NFR BLD AUTO: 0 % (ref 0–6)
ERYTHROCYTE [DISTWIDTH] IN BLOOD BY AUTOMATED COUNT: 16.3 % (ref 11.6–15.1)
GFR SERPL CREATININE-BSD FRML MDRD: 20 ML/MIN/1.73SQ M
GLUCOSE SERPL-MCNC: 103 MG/DL (ref 65–140)
GLUCOSE SERPL-MCNC: 104 MG/DL (ref 65–140)
GLUCOSE SERPL-MCNC: 108 MG/DL (ref 65–140)
GLUCOSE SERPL-MCNC: 119 MG/DL (ref 65–140)
GLUCOSE SERPL-MCNC: 136 MG/DL (ref 65–140)
HCT VFR BLD AUTO: 27 % (ref 34.8–46.1)
HGB BLD-MCNC: 8.5 G/DL (ref 11.5–15.4)
IMM GRANULOCYTES # BLD AUTO: 0.07 THOUSAND/UL (ref 0–0.2)
IMM GRANULOCYTES NFR BLD AUTO: 1 % (ref 0–2)
LYMPHOCYTES # BLD AUTO: 0.41 THOUSANDS/ΜL (ref 0.6–4.47)
LYMPHOCYTES NFR BLD AUTO: 5 % (ref 14–44)
MCH RBC QN AUTO: 31.5 PG (ref 26.8–34.3)
MCHC RBC AUTO-ENTMCNC: 31.5 G/DL (ref 31.4–37.4)
MCV RBC AUTO: 100 FL (ref 82–98)
MONOCYTES # BLD AUTO: 1.13 THOUSAND/ΜL (ref 0.17–1.22)
MONOCYTES NFR BLD AUTO: 14 % (ref 4–12)
NEUTROPHILS # BLD AUTO: 6.28 THOUSANDS/ΜL (ref 1.85–7.62)
NEUTS SEG NFR BLD AUTO: 78 % (ref 43–75)
NRBC BLD AUTO-RTO: 0 /100 WBCS
PLATELET # BLD AUTO: 141 THOUSANDS/UL (ref 149–390)
PMV BLD AUTO: 11.8 FL (ref 8.9–12.7)
POTASSIUM SERPL-SCNC: 3.7 MMOL/L (ref 3.5–5.3)
PROT SERPL-MCNC: 6.6 G/DL (ref 6.4–8.2)
RBC # BLD AUTO: 2.7 MILLION/UL (ref 3.81–5.12)
SODIUM SERPL-SCNC: 139 MMOL/L (ref 136–145)
WBC # BLD AUTO: 8.02 THOUSAND/UL (ref 4.31–10.16)

## 2019-12-02 PROCEDURE — 82948 REAGENT STRIP/BLOOD GLUCOSE: CPT

## 2019-12-02 PROCEDURE — 74176 CT ABD & PELVIS W/O CONTRAST: CPT

## 2019-12-02 PROCEDURE — 99232 SBSQ HOSP IP/OBS MODERATE 35: CPT | Performed by: INTERNAL MEDICINE

## 2019-12-02 PROCEDURE — 80053 COMPREHEN METABOLIC PANEL: CPT | Performed by: PHYSICIAN ASSISTANT

## 2019-12-02 PROCEDURE — 99232 SBSQ HOSP IP/OBS MODERATE 35: CPT | Performed by: HOSPITALIST

## 2019-12-02 PROCEDURE — 85025 COMPLETE CBC W/AUTO DIFF WBC: CPT | Performed by: PHYSICIAN ASSISTANT

## 2019-12-02 RX ADMIN — DAPTOMYCIN 500 MG: 500 INJECTION, POWDER, LYOPHILIZED, FOR SOLUTION INTRAVENOUS at 10:28

## 2019-12-02 RX ADMIN — DOXAZOSIN 4 MG: 4 TABLET ORAL at 23:03

## 2019-12-02 RX ADMIN — FERROUS SULFATE TAB 325 MG (65 MG ELEMENTAL FE) 325 MG: 325 (65 FE) TAB at 08:11

## 2019-12-02 RX ADMIN — SODIUM BICARBONATE 650 MG TABLET 1300 MG: at 16:34

## 2019-12-02 RX ADMIN — BUSPIRONE HYDROCHLORIDE 5 MG: 5 TABLET ORAL at 08:11

## 2019-12-02 RX ADMIN — INSULIN GLARGINE 5 UNITS: 100 INJECTION, SOLUTION SUBCUTANEOUS at 22:55

## 2019-12-02 RX ADMIN — TACROLIMUS 3 MG: 1 CAPSULE ORAL at 08:10

## 2019-12-02 RX ADMIN — CLONIDINE HYDROCHLORIDE 0.3 MG: 0.1 TABLET ORAL at 13:06

## 2019-12-02 RX ADMIN — CHOLECALCIFEROL TAB 25 MCG (1000 UNIT) 1000 UNITS: 25 TAB at 22:54

## 2019-12-02 RX ADMIN — HYDRALAZINE HYDROCHLORIDE 100 MG: 25 TABLET ORAL at 08:10

## 2019-12-02 RX ADMIN — CHOLECALCIFEROL TAB 25 MCG (1000 UNIT) 1000 UNITS: 25 TAB at 08:11

## 2019-12-02 RX ADMIN — PREDNISONE 5 MG: 5 TABLET ORAL at 08:11

## 2019-12-02 RX ADMIN — HYDRALAZINE HYDROCHLORIDE 100 MG: 25 TABLET ORAL at 16:33

## 2019-12-02 RX ADMIN — FOLIC ACID 1000 MCG: 1 TABLET ORAL at 08:11

## 2019-12-02 RX ADMIN — SODIUM BICARBONATE 650 MG TABLET 1300 MG: at 22:54

## 2019-12-02 RX ADMIN — SEVELAMER HYDROCHLORIDE 800 MG: 800 TABLET, FILM COATED PARENTERAL at 13:09

## 2019-12-02 RX ADMIN — CLONIDINE HYDROCHLORIDE 0.3 MG: 0.1 TABLET ORAL at 23:03

## 2019-12-02 RX ADMIN — Medication 250 MG: at 18:07

## 2019-12-02 RX ADMIN — CHOLECALCIFEROL TAB 25 MCG (1000 UNIT) 1000 UNITS: 25 TAB at 16:33

## 2019-12-02 RX ADMIN — SODIUM BICARBONATE 650 MG TABLET 1300 MG: at 08:11

## 2019-12-02 RX ADMIN — TACROLIMUS 2 MG: 1 CAPSULE ORAL at 22:54

## 2019-12-02 RX ADMIN — SERTRALINE HYDROCHLORIDE 200 MG: 100 TABLET ORAL at 08:11

## 2019-12-02 RX ADMIN — HEPARIN SODIUM 5000 UNITS: 5000 INJECTION INTRAVENOUS; SUBCUTANEOUS at 22:54

## 2019-12-02 RX ADMIN — LEVOTHYROXINE SODIUM 125 MCG: 125 TABLET ORAL at 06:54

## 2019-12-02 RX ADMIN — BUSPIRONE HYDROCHLORIDE 5 MG: 5 TABLET ORAL at 18:07

## 2019-12-02 RX ADMIN — SEVELAMER HYDROCHLORIDE 800 MG: 800 TABLET, FILM COATED PARENTERAL at 08:12

## 2019-12-02 RX ADMIN — METOPROLOL TARTRATE 50 MG: 50 TABLET, FILM COATED ORAL at 23:02

## 2019-12-02 RX ADMIN — METOPROLOL TARTRATE 50 MG: 50 TABLET, FILM COATED ORAL at 08:11

## 2019-12-02 RX ADMIN — LORAZEPAM 2 MG: 1 TABLET ORAL at 08:11

## 2019-12-02 RX ADMIN — ONDANSETRON 4 MG: 2 INJECTION INTRAMUSCULAR; INTRAVENOUS at 06:45

## 2019-12-02 RX ADMIN — ASPIRIN 81 MG: 81 TABLET, COATED ORAL at 08:11

## 2019-12-02 RX ADMIN — ARIPIPRAZOLE 20 MG: 5 TABLET ORAL at 22:55

## 2019-12-02 RX ADMIN — HYDRALAZINE HYDROCHLORIDE 100 MG: 25 TABLET ORAL at 23:03

## 2019-12-02 RX ADMIN — SEVELAMER HYDROCHLORIDE 800 MG: 800 TABLET, FILM COATED PARENTERAL at 16:35

## 2019-12-02 RX ADMIN — PRAVASTATIN SODIUM 80 MG: 80 TABLET ORAL at 08:11

## 2019-12-02 RX ADMIN — AMLODIPINE BESYLATE 10 MG: 10 TABLET ORAL at 08:11

## 2019-12-02 RX ADMIN — HEPARIN SODIUM 5000 UNITS: 5000 INJECTION INTRAVENOUS; SUBCUTANEOUS at 13:10

## 2019-12-02 RX ADMIN — LIDOCAINE 1 PATCH: 50 PATCH TOPICAL at 08:09

## 2019-12-02 RX ADMIN — ACETAMINOPHEN 650 MG: 325 TABLET, FILM COATED ORAL at 06:55

## 2019-12-02 RX ADMIN — DULOXETINE HYDROCHLORIDE 60 MG: 60 CAPSULE, DELAYED RELEASE ORAL at 18:07

## 2019-12-02 RX ADMIN — DULOXETINE HYDROCHLORIDE 60 MG: 60 CAPSULE, DELAYED RELEASE ORAL at 08:11

## 2019-12-02 RX ADMIN — CLONIDINE HYDROCHLORIDE 0.3 MG: 0.1 TABLET ORAL at 06:54

## 2019-12-02 NOTE — ASSESSMENT & PLAN NOTE
Patient admitted with dysuria weakness and fatigue  Has a history of renal/pancreatic transplant in 1998 on chronic immuno suppression  Urine culture growing enterococcus fecium VRE  On daptomycin based on sensitivity  Also sensitive to linezolid, nitrofurantoin  Would not be able to use nitrofurantoin  secondary to CKD 4    linezolid contraindicated secondary to patient being on buspar  UTI secondary to bladder drainage of the exocrine pancreas  Not due to Moreno   Currently on daptomycin #7  Plan for a 7 day course through 12 02/2019(today)  Patient however complaining of abdominal cramps and development of loose mucousy stools since yesterday  Her stool softeners were discontinued yesterday  Patient does not fever or leukocytosis however she is chronically immunosuppressed  Tedizolid considered as an oral option -however upon discussion with Nephrology and pharmacology it was deemed that IV daptomycin had better penetration and activity against VRE Enterococcus Fecium in a transplanted kidney patient    Plan:  · Appreciate ID consult   · Continue daptomycin#7/7  · C/w immunosuppressives for renal transplant in 1998   · Urine culture: VRE Enterococcus fecium  · If continues to have diarrhea despite discontinuation of stool softeners will test for C diff  · Will check a CT abdomen without contrast given that she has a transplanted kidney and is complaining of abdominal cramps

## 2019-12-02 NOTE — ASSESSMENT & PLAN NOTE
Lab Results   Component Value Date    HGBA1C 5 1 09/09/2019       Recent Labs     12/01/19  1118 12/01/19  1550 12/01/19  2151 12/02/19  0746   POCGLU 144* 158* 138 103       Blood Sugar Average: Last 72 hrs:  (P) 123 [id number]   Lantus 5 units     Blood sugars remained stable at current regimen

## 2019-12-02 NOTE — PROGRESS NOTES
Progress Note - Marline Cohn 1965, 53 y o  female MRN: [id number]    Unit/Bed#: S -01 Encounter: [id number]    Primary Care Provider: Filomena Ordaz MD   Date and time admitted to hospital: 11/23/2019  2:47 PM        * Recurrent UTI  Assessment & Plan  Patient admitted with dysuria weakness and fatigue  Has a history of renal/pancreatic transplant in 1998 on chronic immuno suppression  Urine culture growing enterococcus fecium VRE  On daptomycin based on sensitivity  Also sensitive to linezolid, nitrofurantoin  Would not be able to use nitrofurantoin  secondary to CKD 4    linezolid contraindicated secondary to patient being on buspar  UTI secondary to bladder drainage of the exocrine pancreas  Not due to Moreno   Currently on daptomycin #7  Plan for a 7 day course through 12 02/2019(today)  Patient however complaining of abdominal cramps and development of loose mucousy stools since yesterday  Her stool softeners were discontinued yesterday  Patient does not fever or leukocytosis however she is chronically immunosuppressed  Tedizolid considered as an oral option -however upon discussion with Nephrology and pharmacology it was deemed that IV daptomycin had better penetration and activity against VRE Enterococcus Fecium in a transplanted kidney patient    Plan:  · Appreciate ID consult   · Continue daptomycin#7/7  · C/w immunosuppressives for renal transplant in 1998   · Urine culture: VRE Enterococcus fecium  · If continues to have diarrhea despite discontinuation of stool softeners will test for C diff  · Will check a CT abdomen without contrast given that she has a transplanted kidney and is complaining of abdominal cramps  Essential hypertension  Assessment & Plan  Blood pressure was elevated  181/74, 183/76 on admission     With increase in hydralazine to 75 mg t i d  Blood pressure now trending down to between 150 and 160  -will discuss with Nephrology in adjustment of her blood pressure medications    Plan:  · Continue Norvasc 10 mg daily  · Continue clonidine 0 3 mg t i d   · Continue doxazosin 4 mg daily  · Increased hydralazine to 75 mg t i d   · hydralazine 5 mg injection p r n for SBP more than 160  · Appreciate Nephrology recommendations      Multiple myeloma not having achieved remission [facility])  Assessment & Plan  Patient with past medical history of multiple myeloma not having achieved remission  · On Revlimid  · Will follow up with Oncology upon discharge  Renal transplant recipient  Assessment & Plan  Renal and pancreatic transplant recipient in 1998  Stable    Plan:  · Continue tacrolimus and prednisone  Tacrolimus level is 3 2-which is what her transplant team wants the level to be  · Monitor creatinine which is currently at 2 64- down  from 3 2 on admission    Baseline creatinine of 2 5    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 [facility])  Assessment & Plan  Lab Results   Component Value Date    HGBA1C 5 1 09/09/2019       Recent Labs     12/01/19  1118 12/01/19  1550 12/01/19  2151 12/02/19  0746   POCGLU 144* 158* 138 103       Blood Sugar Average: Last 72 hrs:  (P) 123 [id number]   Lantus 5 units  Blood sugars remained stable at current regimen    Chronic kidney disease, stage 4 (severe) ([facility] [address] )  Assessment & Plan  Patient with past medical history of renal transplant in 1998  Patient's baseline creatinine is 2 4-2 8  On admission patient's creatinine was 3 15 has now trended down to 2 64 today  ·     Plan  ·   Appreciate nephrology input  · Monitor creatinine levels  Also with acidosis secondary to chronic bicarb loss  Since pancreatic exocrine secretions being drained into the bladder  Continue with bicarb  repletion    Anemia  Assessment & Plan  Patient with chronic kidney disease and as a result of chronic anemia  Hemoglobin 7 3 at baseline  · 11/25 patient's hemoglobin decreased to 6 6    Patient received 1 unit of PRBC and hemoglobin currently is around 8 5  Plan:  · Monitor hemoglobin    · Continue oral ferrous sulfate  ·     Ambulatory dysfunction  Assessment & Plan  Patient with generalized weakness and ambulatory dysfunction secondary to deconditioning from current condition  PT GIORGI glover  Recommended home with home PT  Anticipate DC onTuesday after completion of IV antibiotics if diarrhea and abdominal pain results  Abdominal cramps and loose stools--over the past 48 hours  Her stool softeners have been discontinued now  No fever or leukocytosis however she is chronically immunosuppressed  If diarrhea persists tomorrow will repeat a C diff  Will repeat a CT scan without contrast today given that she is complaining of abdominal distension and has a transplanted kidney      Subjective:    Physical Exam:   Vitals: Blood pressure 158/56, pulse 76, temperature 98 3 °F (36 8 °C), temperature source Oral, resp  rate 18, weight 110 kg (241 lb 10 oz), SpO2 96 %  ,Body mass index is 36 74 kg/m²  Gen:  Pleasant, non-tachypnic, non-dyspnic  Conversant  Heart: regular rate and rhythm, S1S2 present, no murmur, rub or gallop  Lungs: clear to ausculatation bilaterally  No wheezing, crackless, or rhonchi  No accessory muscle use or respiratory distress  Abd: soft, non-tender, non-distended  NABS, no guarding, rebound or peritoneal signs  Extremities: no clubbing, cyanosis or edema  2+pedal pulses bilaterally  Full range of motion  Neuro: awake, alert and oriented  Cranial nerves 2-12 intact  Strength and sensation grossly intact  Skin: warm and dry: no petechiae, purpura and rash      LABS:   Results from last 7 days   Lab Units 12/02/19  0049 11/30/19  0442 11/29/19  0516 11/27/19  0611 11/26/19  0811   WBC Thousand/uL 8 02  --   --   --  5 73   HEMOGLOBIN g/dL 8 5* 7 9* 8 1* 7 8* 8 0*   HEMATOCRIT % 27 0* 26 1* 26 6* 25 1* 26 7*   PLATELETS Thousands/uL 141*  --   --  145* 153     Results from last 7 days   Lab Units 12/02/19  0049 11/30/19  0442 11/28/19  0511   POTASSIUM mmol/L 3 7 4 4 4 0   CHLORIDE mmol/L 108 110* 110*   CO2 mmol/L 19* 18* 18*   BUN mg/dL 47* 41* 44*   CREATININE mg/dL 2 64* 2 71* 2 81*   CALCIUM mg/dL 9 0 9 0 8 8       Intake/Output Summary (Last 24 hours) at 12/2/2019 0829  Last data filed at 12/1/2019 1101  Gross per 24 hour   Intake    Output 200 ml   Net -200 ml           Current Facility-Administered Medications:  acetaminophen 650 mg Oral Q6H PRN Javy Parsons PA-C    amLODIPine 10 mg Oral QAM Radha Rollins MD    ARIPiprazole 20 mg Oral HS Gabriela Samaniego MD    aspirin 81 mg Oral Daily Gabriela Samaniego MD    busPIRone 5 mg Oral BID Gabriela Samaniego MD    cholecalciferol 1,000 Units Oral TID Gabriela Samaniego MD    cloNIDine 0 3 mg Oral [facility] Gabriela Samaniego MD    DAPTOmycin 6 mg/kg (Adjusted) Intravenous Q48H Stiven MD Loc Last Rate: 500 mg (11/30/19 1140)   doxazosin 4 mg Oral HS Milana Roland PA-C    DULoxetine 60 mg Oral BID Gabriela Samaniego MD    ferrous sulfate 325 mg Oral Daily With Breakfast Gabriela Samaniego MD    folic acid 0,688 mcg Oral Daily Gabriela Samaniego MD    heparin (porcine) 5,000 Units Subcutaneous [facility] Gabriela Samaniego MD    hydrALAZINE 5 mg Intravenous Q6H PRN Shawnee Canavan, MD    hydrALAZINE 100 mg Oral TID [facility], PAFaraC    insulin glargine 5 Units Subcutaneous HS Gabriela Samaniego MD    insulin lispro 1-6 Units Subcutaneous TID AC Javy Parsons PA-C    insulin lispro 1-6 Units Subcutaneous HS Javy Parsons PA-C    levothyroxine 125 mcg Oral Early Morning Gabriela Samaniego MD    lidocaine 1 patch Topical Daily Vicky Lopez MD    LORazepam 2 mg Oral TID PRN Gabriela Samaniego MD    metoprolol tartrate 50 mg Oral Q12H [address] [facility], PAWILBUR    ondansetron 4 mg Intravenous Q8H PRN Sol Johnson PA-C    pravastatin 80 mg Oral Daily Gabriela Samaniego MD    predniSONE 5 mg Oral Daily Gabriela Samaniego MD promethazine 25 mg Intramuscular Q6H PRN Javy Parsons PA-C    saccharomyces boulardii 250 mg Oral BID Sabrina Cortés MD    sertraline 200 mg Oral Daily Wei Mazariegos MD    sevelamer 800 mg Oral TID With Meals Wei Mazariegos MD    sodium bicarbonate 1,300 mg Oral TID Wei Mazariegos MD    tacrolimus 2 mg Oral HS Eda Chatman MD    tacrolimus 3 mg Oral Daily Eda Chatman MD        Family update- updated dad

## 2019-12-02 NOTE — PHYSICAL THERAPY NOTE
Physical Therapy Cancellation Note    Attempt made to see patient for physical therapy session however patient refusal despite encouragement with complaints of stomach being "upset", will continue to follow as appropriate      Adriana Talley PTA

## 2019-12-02 NOTE — PROGRESS NOTES
Progress Note - Infectious Disease   Cathye Babinski 53 y o  female MRN: [id number]  Unit/Bed#: S -01 Encounter: [id number]      Impression/Recommendations:  1  VRE UTI  Patient is clinically well on IV daptomycin  No evidence of pyelonephritis clinically  Patient has remained systemically well, without sepsis or systemic toxicity  Complete 7 day treatment of IV daptomycin today  Monitor temperature/WBC  Monitor urinary symptoms  2  Diarrhea today  Patient is clinically and systemically well  She has no fever/leukocytosis  Abdominal exam benign  Patient had been on laxative and stool softeners  Will hold these and monitor diarrhea  If diarrhea persists, can consider C difficile colitis then  Discontinue laxatives and stool softeners  Monitor diarrhea  If diarrhea persist in the next 24 hours, consider checking stool for C diff toxin then  3  EDUARDO, superimposed on CKD  Creatinine is improved  Antibiotic dosages adjusted accordingly  4  Status post kidney and pancreas transplant  Patient is on stable anti rejection regimen  5  Multiple myeloma, on Revlimid  Discussed with patient in detail regarding the above plan  Discussed with Dr Lindsey Lorenzana from [facility] service  Antibiotics:  Daptomycin # 7     Subjective:  Patient complains of diarrhea today  She has been on laxatives  No abdominal pain or nausea/vomiting  No flank pain  No urinary symptoms  Temperature stays down  No chills  Objective:  Vitals:  Temp:  [98 3 °F (36 8 °C)-99 3 °F (37 4 °C)] 98 3 °F (36 8 °C)  HR:  [60-76] 76  Resp:  [18] 18  BP: (158-188)/(24-56) 158/56  SpO2:  [92 %-96 %] 96 %  Temp (24hrs), Av 7 °F (37 1 °C), Min:98 3 °F (36 8 °C), Max:99 3 °F (37 4 °C)  Current: Temperature: 98 3 °F (36 8 °C)    Physical Exam:     General: Awake, alert, cooperative, no distress  Neck:  Supple  No mass  No lymphadenopathy     Lungs: Expansion symmetric, no rales, no wheezing, respirations unlabored  Heart:  Regular rate and rhythm, S1 and S2 normal, no murmur  Abdomen: Soft, nondistended, non-tender, bowel sounds active all four quadrants,        no masses, no organomegaly  Extremities: Stable leg edema  No erythema/warmth  No ulcer  Nontender to palpation  Skin:  No rash  Neuro: Moves all extremities  Invasive Devices     Peripheral Intravenous Line            Peripheral IV 11/29/19 Right Arm 3 days                Labs studies:   I have personally reviewed pertinent labs  Results from last 7 days   Lab Units 12/02/19 0049 11/30/19 0442 11/28/19  0511   POTASSIUM mmol/L 3 7 4 4 4 0   CHLORIDE mmol/L 108 110* 110*   CO2 mmol/L 19* 18* 18*   BUN mg/dL 47* 41* 44*   CREATININE mg/dL 2 64* 2 71* 2 81*   EGFR ml/min/1 73sq m 20 19 18   CALCIUM mg/dL 9 0 9 0 8 8   AST U/L 10  --   --    ALT U/L 12  --   --    ALK PHOS U/L 87  --   --      Results from last 7 days   Lab Units 12/02/19 0049 11/30/19 0442 11/29/19  0516 11/27/19  0611 11/26/19  0811   WBC Thousand/uL 8 02  --   --   --  5 73   HEMOGLOBIN g/dL 8 5* 7 9* 8 1* 7 8* 8 0*   PLATELETS Thousands/uL 141*  --   --  145* 153           Imaging Studies:   I have personally reviewed pertinent imaging study reports and images in PACS  EKG, Pathology, and Other Studies:   I have personally reviewed pertinent reports

## 2019-12-02 NOTE — PROGRESS NOTES
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, diastolic CHF, aortic regurg, zoster, left subclavian stenosis who presented to [facility] on 11/23 with inability to perform straight cath, fevers and abdominal pain  1  Acute kidney injury (POA):  · Etiology:  Likely ATN secondary to sepsis/recurrent UTI  History of urinary retention with patient performing straight catheterization at home  · Workup:    · Urinalysis 30-50 WBCs, innumerable bacteria  1-2 RBCs, trace protein, large leukocytes  · Imaging:  CT of the abdomen and pelvis  No transplant hydronephrosis noted  Bladder distention noted with bladder diverticulum likely related to previous surgery  Slight stranding noted left lower quadrant renal transplant  · Repeat CT of the abdomen pelvis due to abdominal pain- pending   · Renal function stable, creatinine 2 6  · Plan:  · NO changes, continue current plan of care  · Follow up on CT  2  Chronic kidney disease, stage IV:  Chronic allograft nephropathy  · Nephrologist:  Dr Cira Blanchard  · Baseline creatinine:  2 5  · Urine protein creatinine ratio 2 1 g 11/05/2019  3  Immune suppression:    · On tacrolimus 3 mg in the morning and 2 mg in the evening  · Last tacrolimus level 3 3 on 11/26/2019  · Goal 3-5  · Also on prednisone  4  VRE UTI:  · Id following  · History of recurrent urinary tract infections  · Completed current dose of Daptomycin  5  Urinary retention:    · Follows with Urology      · Moreno catheter removed 11/24/2019 with plan for self catheterization 3 times daily  6  Hypertension:    · Systolic blood pressure elevation but diastolic readings very low  · History of left subclavian stenosis  · History of orthostasis- check orthosatics  · Renal artery stenosis of transplanted kidney on imaging but angio at [facility] was unrevealing  · On multidrug regime:  Amlodipine mg daily, clonidine 0 3 mg every 8 hours, Cardura 4 mg hs, hydralazine 100 mg t i d , metoprolol 50 mg every 12 hours  · Hydralazine increased to 100 mg every 8 hours on 11/27  7  Electrolytes:  · Potassium level acceptable, 3 7  8  Low bicarbonate:  · Continue sodium bicarbonate tablets 1300 mg t i d   9  Anemia:    · On oral iron replacement, folic acid  · Hemoglobin appears to be at baseline  10  CKD MBD:    · On sevelamer 800 mg t i d  With meals  · On vitamin D3 supplement 1000 units 3 times daily  11  Failed pancreatic transplant:  12  Multiple myeloma on Revlimid      SUBJECTIVE / INTERVAL HISTORY:  Abdominal pain   Going for CT    OBJECTIVE:  Current Weight: Weight - Scale: 110 kg (241 lb 10 oz)  Vitals:    12/02/19 0633 12/02/19 0654 12/02/19 0745 12/02/19 1306   BP:  (!) 162/40 158/56 152/50   BP Location:   Left arm    Pulse:   76    Resp:   18    Temp: 98 7 °F (37 1 °C)  98 3 °F (36 8 °C)    TempSrc: Oral  Oral    SpO2:   96%    Weight:           Intake/Output Summary (Last 24 hours) at 12/2/2019 1312  Last data filed at 12/2/2019 1100  Gross per 24 hour   Intake 240 ml   Output    Net 240 ml     General: NAD  Skin: no rash  Eyes: anicteric sclera  ENT: moist mucous membrane  Neck: supple  Chest: CTA b/l, no ronchii, no wheeze, no rubs, no rales  CVS: s1s2, no murmur, no gallop, no rub  Abdomen: soft, nontender, nl sounds  Extremities: no edema LE b/l  Neuro: AAOX3  Psych: normal affect  Medications:    Current Facility-Administered Medications:     acetaminophen (TYLENOL) tablet 650 mg, 650 mg, Oral, Q6H PRN, Javy Parsons PA-C, 650 mg at 12/02/19 0655    amLODIPine (NORVASC) tablet 10 mg, 10 mg, Oral, QAM, Deangelo Matt MD, 10 mg at 12/02/19 0811    ARIPiprazole (ABILIFY) tablet 20 mg, 20 mg, Oral, HS, Betha Goodell, MD, 20 mg at 12/01/19 2200    aspirin (ECOTRIN LOW STRENGTH) EC tablet 81 mg, 81 mg, Oral, Daily, Betha Goodell, MD, 81 mg at 12/02/19 0811    busPIRone (BUSPAR) tablet 5 mg, 5 mg, Oral, BID, Betha Goodell, MD, 5 mg at 12/02/19 6268    cholecalciferol (VITAMIN D3) tablet 1,000 Units, 1,000 Units, Oral, TID, Betha Goodell, MD, 1,000 Units at 12/02/19 0811    cloNIDine (CATAPRES) tablet 0 3 mg, 0 3 mg, Oral, Q8H [facility], Betha Goodell, MD, 0 3 mg at 12/02/19 1306    doxazosin (CARDURA) tablet 4 mg, 4 mg, Oral, HS, [facility], 4 mg at 12/01/19 2200    DULoxetine (CYMBALTA) delayed release capsule 60 mg, 60 mg, Oral, BID, Betha Goodell, MD, 60 mg at 12/02/19 8159    ferrous sulfate tablet 325 mg, 325 mg, Oral, Daily With Breakfast, Betha Goodell, MD, 325 mg at 22/70/86 6610    folic acid (FOLVITE) tablet 1,000 mcg, 1,000 mcg, Oral, Daily, Betha Goodell, MD, 1,000 mcg at 12/02/19 0811    heparin (porcine) subcutaneous injection 5,000 Units, 5,000 Units, Subcutaneous, Q8H [facility], 5,000 Units at 12/02/19 1310 **AND** [COMPLETED] Platelet count, , , Once, Betha Goodell, MD    hydrALAZINE (APRESOLINE) injection 5 mg, 5 mg, Intravenous, Q6H PRN, Neel Moser MD, 5 mg at 12/01/19 1006    hydrALAZINE (APRESOLINE) tablet 100 mg, 100 mg, Oral, TID, [facility], PA-C, 100 mg at 12/02/19 0810    insulin glargine (LANTUS) subcutaneous injection 5 Units 0 05 mL, 5 Units, Subcutaneous, HS, Betha Goodell, MD, 5 Units at 12/01/19 2201    insulin lispro (HumaLOG) 100 units/mL subcutaneous injection 1-6 Units, 1-6 Units, Subcutaneous, TID AC, 1 Units at 12/01/19 1614 **AND** Fingerstick Glucose (POCT), , , TID AC, Javy Parsons PA-C    insulin lispro (HumaLOG) 100 units/mL subcutaneous injection 1-6 Units, 1-6 Units, Subcutaneous, POONAM, Javy Oral Skeens, JOHN, Stopped at 11/23/19 2147    levothyroxine tablet 125 mcg, 125 mcg, Oral, Early Morning, Armando Branham MD, 125 mcg at 12/02/19 0654    lidocaine (LIDODERM) 5 % patch 1 patch, 1 patch, Topical, Daily, Elaine Hsieh MD, 1 patch at 12/02/19 0809    LORazepam (ATIVAN) tablet 2 mg, 2 mg, Oral, TID PRN, Armando Branham MD, 2 mg at 12/02/19 0811    metoprolol tartrate (LOPRESSOR) tablet 50 mg, 50 mg, Oral, Q12H [address], [facility], JOHN, 50 mg at 12/02/19 7361    ondansetron (ZOFRAN) injection 4 mg, 4 mg, Intravenous, Q8H PRN, Leeanne Morales, JOHN, 4 mg at 12/02/19 0645    pravastatin (PRAVACHOL) tablet 80 mg, 80 mg, Oral, Daily, Armando Branham MD, 80 mg at 12/02/19 7150    predniSONE tablet 5 mg, 5 mg, Oral, Daily, Armando Branham MD, 5 mg at 12/02/19 1144    promethazine (PHENERGAN) injection 25 mg, 25 mg, Intramuscular, Q6H PRN, Javy Parsons PA-C    saccharomyces boulardii (FLORASTOR) capsule 250 mg, 250 mg, Oral, BID, Dena Elkins MD, 250 mg at 12/01/19 1716    sertraline (ZOLOFT) tablet 200 mg, 200 mg, Oral, Daily, Armando Branham MD, 200 mg at 12/02/19 6530    sevelamer (RENAGEL) tablet 800 mg, 800 mg, Oral, TID With Meals, rAmando Branham MD, 800 mg at 12/02/19 1309    sodium bicarbonate tablet 1,300 mg, 1,300 mg, Oral, TID, Armando Branham MD, 1,300 mg at 12/02/19 0811    tacrolimus (PROGRAF) capsule 2 mg, 2 mg, Oral, HS, George Merrill MD, 2 mg at 12/01/19 2200    tacrolimus (PROGRAF) capsule 3 mg, 3 mg, Oral, Daily, George Merrill MD, 3 mg at 12/02/19 0810    Laboratory Results:  Results from last 7 days   Lab Units 12/02/19  0049 11/30/19  0442 11/29/19  0516 11/28/19  0511 11/27/19  0611 11/27/19  0454 11/26/19  0811   WBC Thousand/uL 8 02  --   --   --   --   --  5 73   HEMOGLOBIN g/dL 8 5* 7 9* 8 1*  --  7 8*  --  8 0*   HEMATOCRIT % 27 0* 26 1* 26 6*  --  25 1*  --  26 7*   PLATELETS Thousands/uL 141*  --   --   --  145*  --  153   POTASSIUM mmol/L 3 7 4 4  --  4 0  --  4 0 4 3   CHLORIDE mmol/L 108 110*  --  110*  --  111* 111*   CO2 mmol/L 19* 18*  --  18*  --  18* 18*   BUN mg/dL 47* 41*  --  44*  --  44* 46*   CREATININE mg/dL 2 64* 2 71*  --  2 81*  --  3 03* 3 20*   CALCIUM mg/dL 9 0 9 0  --  8 8  --  8 1* 8 1*   PHOSPHORUS mg/dL  --   --   --  4 7*  --   --   --

## 2019-12-02 NOTE — ASSESSMENT & PLAN NOTE
Patient with past medical history of renal transplant in 1998  Patient's baseline creatinine is 2 4-2 8  On admission patient's creatinine was 3 15 has now trended down to 2 64 today  ·     Plan  ·   Appreciate nephrology input  · Monitor creatinine levels  Also with acidosis secondary to chronic bicarb loss  Since pancreatic exocrine secretions being drained into the bladder    Continue with bicarb  repletion

## 2019-12-02 NOTE — ASSESSMENT & PLAN NOTE
Blood pressure was elevated  181/74, 183/76 on admission     With increase in hydralazine to 75 mg t i d  Blood pressure now trending down to between 150 and 160  -will discuss with Nephrology in adjustment of her blood pressure medications    Plan:  · Continue Norvasc 10 mg daily  · Continue clonidine 0 3 mg t i d   · Continue doxazosin 4 mg daily  · Increased hydralazine to 75 mg t i d   · hydralazine 5 mg injection p r n for SBP more than 160    · Appreciate Nephrology recommendations

## 2019-12-02 NOTE — ASSESSMENT & PLAN NOTE
Patient with chronic kidney disease and as a result of chronic anemia  Hemoglobin 7 3 at baseline  · 11/25 patient's hemoglobin decreased to 6 6  Patient received 1 unit of PRBC and hemoglobin currently is around 8 5  Plan:  · Monitor hemoglobin      · Continue oral ferrous sulfate  ·

## 2019-12-02 NOTE — PROGRESS NOTES
Nursing staff inform you of the patient was noted to have fecal and urinary incontinence earlier today which was unusual for her  She is also complaining of diarrhea, abdominal pain and was concerned given it was located over her transplanted kidney  The patient also was reporting chills  I physically saw and examined the patient  She is noted to be awake, alert, talkative and cooperative  She does not appear altered  She is able to provide history  She reports that she had not had a bowel movement for several days and then received Dulcolax the yesterday and the day prior  She was noted to have diarrhea beginning this morning and abdominal cramping  She reports that the Bentyl has made this worse  She feels generalized abdominal cramping however it is worse over the left side which was location of her transplanted kidney  She also reports that she has had occasional chills over the past day  She has poor oral intake but no vomiting today  Temp personally checked and 98 1  No rigors on exam  Heart RRR  Abdomen hypoactive and nondistended  Mild tenderness Left > Right sided  Will stop dulcolax, bentyl  Check labs now  Monitor abdominal exams  If any change can consider further imaging (US vs  CT) to assess transplanted kidney and ensure no pyelo or abscess  If diarrhea does not resolve with discontinuation of laxatives, consider c diff testing  She is afebrile at this time with recent dulcolax use and thus will not test at this time

## 2019-12-02 NOTE — ASSESSMENT & PLAN NOTE
Patient with generalized weakness and ambulatory dysfunction secondary to deconditioning from current condition  PT OT alberta glover  Recommended home with home PT  Anticipate DC onTuesday after completion of IV antibiotics if diarrhea and abdominal pain results

## 2019-12-02 NOTE — ASSESSMENT & PLAN NOTE
Renal and pancreatic transplant recipient in 1998  Stable    Plan:  · Continue tacrolimus and prednisone  Tacrolimus level is 3 2-which is what her transplant team wants the level to be  · Monitor creatinine which is currently at 2 64- down  from 3 2 on admission    Baseline creatinine of 2 5

## 2019-12-03 PROBLEM — R19.7 DIARRHEA: Status: ACTIVE | Noted: 2019-12-03

## 2019-12-03 LAB
ALBUMIN SERPL BCP-MCNC: 2.5 G/DL (ref 3.5–5)
ALP SERPL-CCNC: 79 U/L (ref 46–116)
ALT SERPL W P-5'-P-CCNC: 11 U/L (ref 12–78)
ANION GAP SERPL CALCULATED.3IONS-SCNC: 13 MMOL/L (ref 4–13)
AST SERPL W P-5'-P-CCNC: 10 U/L (ref 5–45)
BASOPHILS # BLD AUTO: 0.12 THOUSANDS/ΜL (ref 0–0.1)
BASOPHILS NFR BLD AUTO: 2 % (ref 0–1)
BILIRUB SERPL-MCNC: 0.36 MG/DL (ref 0.2–1)
BUN SERPL-MCNC: 54 MG/DL (ref 5–25)
CALCIUM SERPL-MCNC: 8.5 MG/DL (ref 8.3–10.1)
CHLORIDE SERPL-SCNC: 109 MMOL/L (ref 100–108)
CO2 SERPL-SCNC: 17 MMOL/L (ref 21–32)
CREAT SERPL-MCNC: 3.33 MG/DL (ref 0.6–1.3)
EOSINOPHIL # BLD AUTO: 0.08 THOUSAND/ΜL (ref 0–0.61)
EOSINOPHIL NFR BLD AUTO: 1 % (ref 0–6)
ERYTHROCYTE [DISTWIDTH] IN BLOOD BY AUTOMATED COUNT: 17.2 % (ref 11.6–15.1)
GFR SERPL CREATININE-BSD FRML MDRD: 15 ML/MIN/1.73SQ M
GLUCOSE SERPL-MCNC: 104 MG/DL (ref 65–140)
GLUCOSE SERPL-MCNC: 113 MG/DL (ref 65–140)
GLUCOSE SERPL-MCNC: 126 MG/DL (ref 65–140)
GLUCOSE SERPL-MCNC: 127 MG/DL (ref 65–140)
GLUCOSE SERPL-MCNC: 132 MG/DL (ref 65–140)
HCT VFR BLD AUTO: 24 % (ref 34.8–46.1)
HGB BLD-MCNC: 7.2 G/DL (ref 11.5–15.4)
IMM GRANULOCYTES # BLD AUTO: 0.05 THOUSAND/UL (ref 0–0.2)
IMM GRANULOCYTES NFR BLD AUTO: 1 % (ref 0–2)
LYMPHOCYTES # BLD AUTO: 1.07 THOUSANDS/ΜL (ref 0.6–4.47)
LYMPHOCYTES NFR BLD AUTO: 18 % (ref 14–44)
MCH RBC QN AUTO: 30.6 PG (ref 26.8–34.3)
MCHC RBC AUTO-ENTMCNC: 30 G/DL (ref 31.4–37.4)
MCV RBC AUTO: 102 FL (ref 82–98)
MONOCYTES # BLD AUTO: 0.95 THOUSAND/ΜL (ref 0.17–1.22)
MONOCYTES NFR BLD AUTO: 16 % (ref 4–12)
NEUTROPHILS # BLD AUTO: 3.69 THOUSANDS/ΜL (ref 1.85–7.62)
NEUTS SEG NFR BLD AUTO: 62 % (ref 43–75)
NRBC BLD AUTO-RTO: 0 /100 WBCS
PLATELET # BLD AUTO: 136 THOUSANDS/UL (ref 149–390)
PMV BLD AUTO: 12.5 FL (ref 8.9–12.7)
POTASSIUM SERPL-SCNC: 3.5 MMOL/L (ref 3.5–5.3)
PROT SERPL-MCNC: 6.4 G/DL (ref 6.4–8.2)
RBC # BLD AUTO: 2.35 MILLION/UL (ref 3.81–5.12)
SODIUM SERPL-SCNC: 139 MMOL/L (ref 136–145)
WBC # BLD AUTO: 5.96 THOUSAND/UL (ref 4.31–10.16)

## 2019-12-03 PROCEDURE — 87493 C DIFF AMPLIFIED PROBE: CPT | Performed by: INTERNAL MEDICINE

## 2019-12-03 PROCEDURE — 82948 REAGENT STRIP/BLOOD GLUCOSE: CPT

## 2019-12-03 PROCEDURE — 85025 COMPLETE CBC W/AUTO DIFF WBC: CPT | Performed by: HOSPITALIST

## 2019-12-03 PROCEDURE — 99232 SBSQ HOSP IP/OBS MODERATE 35: CPT | Performed by: INTERNAL MEDICINE

## 2019-12-03 PROCEDURE — 80053 COMPREHEN METABOLIC PANEL: CPT | Performed by: HOSPITALIST

## 2019-12-03 PROCEDURE — 97530 THERAPEUTIC ACTIVITIES: CPT

## 2019-12-03 PROCEDURE — 97116 GAIT TRAINING THERAPY: CPT

## 2019-12-03 RX ORDER — POTASSIUM CHLORIDE 20 MEQ/1
20 TABLET, EXTENDED RELEASE ORAL ONCE
Status: COMPLETED | OUTPATIENT
Start: 2019-12-03 | End: 2019-12-03

## 2019-12-03 RX ORDER — SODIUM CHLORIDE, SODIUM GLUCONATE, SODIUM ACETATE, POTASSIUM CHLORIDE, MAGNESIUM CHLORIDE, SODIUM PHOSPHATE, DIBASIC, AND POTASSIUM PHOSPHATE .53; .5; .37; .037; .03; .012; .00082 G/100ML; G/100ML; G/100ML; G/100ML; G/100ML; G/100ML; G/100ML
75 INJECTION, SOLUTION INTRAVENOUS CONTINUOUS
Status: DISCONTINUED | OUTPATIENT
Start: 2019-12-03 | End: 2019-12-05

## 2019-12-03 RX ADMIN — CHOLECALCIFEROL TAB 25 MCG (1000 UNIT) 1000 UNITS: 25 TAB at 09:11

## 2019-12-03 RX ADMIN — TACROLIMUS 3 MG: 1 CAPSULE ORAL at 09:10

## 2019-12-03 RX ADMIN — LEVOTHYROXINE SODIUM 125 MCG: 125 TABLET ORAL at 05:40

## 2019-12-03 RX ADMIN — SEVELAMER HYDROCHLORIDE 800 MG: 800 TABLET, FILM COATED PARENTERAL at 09:12

## 2019-12-03 RX ADMIN — LORAZEPAM 2 MG: 1 TABLET ORAL at 13:45

## 2019-12-03 RX ADMIN — SODIUM CHLORIDE, SODIUM GLUCONATE, SODIUM ACETATE, POTASSIUM CHLORIDE AND MAGNESIUM CHLORIDE 75 ML/HR: 526; 502; 368; 37; 30 INJECTION, SOLUTION INTRAVENOUS at 13:44

## 2019-12-03 RX ADMIN — HYDRALAZINE HYDROCHLORIDE 100 MG: 25 TABLET ORAL at 17:23

## 2019-12-03 RX ADMIN — SODIUM BICARBONATE 650 MG TABLET 1300 MG: at 17:24

## 2019-12-03 RX ADMIN — SEVELAMER HYDROCHLORIDE 800 MG: 800 TABLET, FILM COATED PARENTERAL at 17:24

## 2019-12-03 RX ADMIN — CHOLECALCIFEROL TAB 25 MCG (1000 UNIT) 1000 UNITS: 25 TAB at 22:15

## 2019-12-03 RX ADMIN — HYDRALAZINE HYDROCHLORIDE 100 MG: 25 TABLET ORAL at 22:14

## 2019-12-03 RX ADMIN — METOPROLOL TARTRATE 50 MG: 50 TABLET, FILM COATED ORAL at 22:14

## 2019-12-03 RX ADMIN — SEVELAMER HYDROCHLORIDE 800 MG: 800 TABLET, FILM COATED PARENTERAL at 13:55

## 2019-12-03 RX ADMIN — PREDNISONE 5 MG: 5 TABLET ORAL at 09:12

## 2019-12-03 RX ADMIN — FERROUS SULFATE TAB 325 MG (65 MG ELEMENTAL FE) 325 MG: 325 (65 FE) TAB at 09:12

## 2019-12-03 RX ADMIN — SERTRALINE HYDROCHLORIDE 200 MG: 100 TABLET ORAL at 09:11

## 2019-12-03 RX ADMIN — LORAZEPAM 2 MG: 1 TABLET ORAL at 22:14

## 2019-12-03 RX ADMIN — POTASSIUM CHLORIDE 20 MEQ: 1500 TABLET, EXTENDED RELEASE ORAL at 13:45

## 2019-12-03 RX ADMIN — TACROLIMUS 2 MG: 1 CAPSULE ORAL at 22:09

## 2019-12-03 RX ADMIN — HEPARIN SODIUM 5000 UNITS: 5000 INJECTION INTRAVENOUS; SUBCUTANEOUS at 05:40

## 2019-12-03 RX ADMIN — METOPROLOL TARTRATE 50 MG: 50 TABLET, FILM COATED ORAL at 09:12

## 2019-12-03 RX ADMIN — BUSPIRONE HYDROCHLORIDE 5 MG: 5 TABLET ORAL at 09:12

## 2019-12-03 RX ADMIN — CLONIDINE HYDROCHLORIDE 0.3 MG: 0.1 TABLET ORAL at 13:45

## 2019-12-03 RX ADMIN — Medication 250 MG: at 17:24

## 2019-12-03 RX ADMIN — Medication 250 MG: at 09:11

## 2019-12-03 RX ADMIN — DULOXETINE HYDROCHLORIDE 60 MG: 60 CAPSULE, DELAYED RELEASE ORAL at 09:12

## 2019-12-03 RX ADMIN — CLONIDINE HYDROCHLORIDE 0.3 MG: 0.1 TABLET ORAL at 22:10

## 2019-12-03 RX ADMIN — ARIPIPRAZOLE 20 MG: 5 TABLET ORAL at 22:14

## 2019-12-03 RX ADMIN — FOLIC ACID 1000 MCG: 1 TABLET ORAL at 09:11

## 2019-12-03 RX ADMIN — DOXAZOSIN 4 MG: 4 TABLET ORAL at 22:15

## 2019-12-03 RX ADMIN — SODIUM BICARBONATE 650 MG TABLET 1300 MG: at 09:11

## 2019-12-03 RX ADMIN — PRAVASTATIN SODIUM 80 MG: 80 TABLET ORAL at 09:11

## 2019-12-03 RX ADMIN — HEPARIN SODIUM 5000 UNITS: 5000 INJECTION INTRAVENOUS; SUBCUTANEOUS at 22:15

## 2019-12-03 RX ADMIN — BUSPIRONE HYDROCHLORIDE 5 MG: 5 TABLET ORAL at 17:24

## 2019-12-03 RX ADMIN — INSULIN GLARGINE 5 UNITS: 100 INJECTION, SOLUTION SUBCUTANEOUS at 22:27

## 2019-12-03 RX ADMIN — CHOLECALCIFEROL TAB 25 MCG (1000 UNIT) 1000 UNITS: 25 TAB at 17:24

## 2019-12-03 RX ADMIN — LIDOCAINE 1 PATCH: 50 PATCH TOPICAL at 09:09

## 2019-12-03 RX ADMIN — ASPIRIN 81 MG: 81 TABLET, COATED ORAL at 09:11

## 2019-12-03 RX ADMIN — DULOXETINE HYDROCHLORIDE 60 MG: 60 CAPSULE, DELAYED RELEASE ORAL at 17:24

## 2019-12-03 RX ADMIN — CLONIDINE HYDROCHLORIDE 0.3 MG: 0.1 TABLET ORAL at 05:40

## 2019-12-03 RX ADMIN — AMLODIPINE BESYLATE 10 MG: 10 TABLET ORAL at 09:11

## 2019-12-03 RX ADMIN — HEPARIN SODIUM 5000 UNITS: 5000 INJECTION INTRAVENOUS; SUBCUTANEOUS at 13:47

## 2019-12-03 RX ADMIN — HYDRALAZINE HYDROCHLORIDE 100 MG: 25 TABLET ORAL at 09:10

## 2019-12-03 RX ADMIN — SODIUM BICARBONATE 650 MG TABLET 1300 MG: at 22:14

## 2019-12-03 NOTE — PROGRESS NOTES
Progress Note - Lucas Pedro 1965, 53 y o  female MRN: [id number]    Unit/Bed#: S -01 Encounter: [id number]    Primary Care Provider: Yandy Llanos MD   Date and time admitted to hospital: 11/23/2019  2:47 PM    * Acute cystitis without hematuria secondary to VRE infection  Assessment & Plan  · Admitted with dysuria, generalized weakness/fatigue  Urine cultures positive for VRE (Enterococcus fecium)  · Overall clinically improved and is status post completion of 7 days course IV daptomycin  · Continue to monitor off antibiotics at this time  · She remains afebrile and without leukocytosis    Diarrhea  Assessment & Plan  · Patient had been on stool softener/laxatives and subsequently developed diarrhea  · Earlier this morning, reported initial improvement with less stool frequency however later in the morning, she again developed multiple episodes of loose stools  Abdominal cramps are resolved  · Due to continued symptoms, will workup further with stool C diff  · Anti diarrheal agents if C diff negative    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)  Assessment & Plan  Lab Results   Component Value Date    HGBA1C 5 1 09/09/2019     Recent Labs     12/02/19  1623 12/02/19  2116 12/03/19  0801 12/03/19  1142   POCGLU 119 104 126 132     Blood Sugar Average: Last 72 hrs:  · (P) 120 9  · Continue Lantus 5 units q h s  With correctional scale insulin  · Blood glucose levels are stable and acceptable    Acute renal failure superimposed on stage 4 chronic kidney disease (HCC)  Assessment & Plan  · Baseline creatinine is 2 4-2 8  Presented with creatinine elevated at 3 15  · Creatinine initially improved to baseline at 2 64 yesterday  Elevated again today at 3 33  · Probably some pre renal component in the setting of multiple episodes loose stools  · Gentle hydration today, continue to monitor closely  · Nephrology following    Appreciate input    Renal transplant recipient  Assessment & Plan  · Status post Renal and pancreatic transplant   · Continue tacrolimus and prednisone  Tacrolimus level is 3 3-which is what her transplant team wants the level to be  · Continue to monitor renal function closely    Multiple myeloma not having achieved remission ([facility] [address] )  Assessment & Plan  · On Revlimid  · Outpatient Oncology follow-up    Ambulatory dysfunction  Assessment & Plan  · Recommended for home with home PT    Essential hypertension  Assessment & Plan  · Blood pressure stable and improved  · Continue current medications  · Hydralazine was increased to 75 mg t i d  Anemia  Assessment & Plan  · Anemia of chronic renal disease with baseline hemoglobin around 7 3  · Hb 7 2 today  Continue to monitor  She is hemodynamically stable  · Continue ferrous sulfate, folic acid    VTE Pharmacologic Prophylaxis:   Pharmacologic: Heparin  Mechanical VTE Prophylaxis in Place: Yes    Patient Centered Rounds: I have performed bedside rounds with nursing staff today  Discussions with Specialists or Other Care Team Provider:  Nursing    Education and Discussions with Family / Patient:  Patient    Current Length of Stay: 10 day(s)    Current Patient Status: Inpatient   Certification Statement: The patient will continue to require additional inpatient hospital stay due to Above diagnosis and care plan    Discharge Plan:  Pending improvement with diarrhea    Code Status: Level 1 - Full Code      Subjective:   Seen and evaluated  She reports feeling much better today and is eager for discharge home  When seen earlier this morning, she reported improvement with stools and resolution of abdominal pain  Later in the morning, she was noted with multiple episodes loose stools    She denies any fever or chills    Objective:     Vitals:   Temp (24hrs), Av °F (37 2 °C), Min:98 °F (36 7 °C), Max:99 6 °F (37 6 °C)    Temp:  [98 °F (36 7 °C)-99 6 °F (37 6 °C)] 98 °F (36 7 °C)  HR:  Whit Steinberg 56  Resp:  [18] 18  BP: (148-164)/(48-56) 148/56  SpO2:  [90 %-95 %] 90 %  Body mass index is 36 3 kg/m²  Input and Output Summary (last 24 hours):     No intake or output data in the 24 hours ending 12/03/19 1238    Physical Exam:  General Appearance:    Alert, cooperative, no distress, appropriately responsive    Head:    Normocephalic, without obvious abnormality, atraumatic, mucous membranes moist    Eyes:    Conjunctiva/corneas clear, EOM's intact   Neck:   Supple   Lungs:     Clear to auscultation bilaterally, respirations unlabored, no crackles or wheeze     Heart:    Regular rate and rhythm, S1 and S2    Abdomen:     Soft, obese, non-tender, nondistended   Extremities:   Extremities normal, atraumatic, no cyanosis or edema   Neurologic:  nonfocal       Additional Data:     Labs:    Results from last 7 days   Lab Units 12/03/19  0611   WBC Thousand/uL 5 96   HEMOGLOBIN g/dL 7 2*   HEMATOCRIT % 24 0*   PLATELETS Thousands/uL 136*   NEUTROS PCT % 62   LYMPHS PCT % 18   MONOS PCT % 16*   EOS PCT % 1     Results from last 7 days   Lab Units 12/03/19  0606   POTASSIUM mmol/L 3 5   CHLORIDE mmol/L 109*   CO2 mmol/L 17*   BUN mg/dL 54*   CREATININE mg/dL 3 33*   CALCIUM mg/dL 8 5   ALK PHOS U/L 79   ALT U/L 11*   AST U/L 10       * I Have Reviewed All Lab Data Listed Above  * Additional Pertinent Lab Tests Reviewed: [facility] Admission Reviewed    Cultures:   Blood Culture:   Lab Results   Component Value Date    BLOODCX No Growth After 5 Days  10/01/2019    BLOODCX No Growth After 5 Days  10/01/2019    BLOODCX No Growth After 5 Days  11/16/2017    BLOODCX No Growth After 5 Days  09/13/2017    BLOODCX No Growth After 5 Days  09/13/2017    BLOODCX No Growth After 5 Days  06/14/2017    BLOODCX No Growth After 5 Days   06/14/2017     Urine Culture:   Lab Results   Component Value Date    URINECX (A) 11/24/2019     >100,000 cfu/ml Vancomycin Resistant Enterococcus faecium    URINECX No Growth <1000 cfu/mL 2019    URINECX <10,000 cfu/ml  2019    URINECX >100,000 cfu/ml Escherichia coli (A) 10/23/2019    URINECX 10,000-19,000 cfu/ml  10/23/2019    URINECX No Growth <1000 cfu/mL 10/08/2019    URINECX >100,000 cfu/ml Escherichia coli (A) 10/01/2019     Sputum Culture: No components found for: SPUTUMCX  Wound Culture: No results found for: WOUNDCULT    Last 24 Hours Medication List:     Current Facility-Administered Medications:  acetaminophen 650 mg Oral Q6H PRN Javy Parsons PA-C   amLODIPine 10 mg Oral QA Sharlene Staples MD   ARIPiprazole 20 mg Oral HS Felipe Quezada, MD   aspirin 81 mg Oral Daily Felipe Quezada, MD   busPIRone 5 mg Oral BID Felipe Quezada MD   cholecalciferol 1,000 Units Oral TID Felipe Quezada MD   cloNIDine 0 3 mg Oral [facility] Felipe Quezada MD   doxazosin 4 mg Oral HS [facility], JOHN   DULoxetine 60 mg Oral BID Felipe Quezada MD   ferrous sulfate 325 mg Oral Daily With Breakfast Felipe Quezada MD   folic acid 2,630 mcg Oral Daily Felipe Quezada MD   heparin (porcine) 5,000 Units Subcutaneous [facility] Felipe Quezada, MD   hydrALAZINE 5 mg Intravenous Q6H PRN Omid Townsend MD   hydrALAZINE 100 mg Oral TID [facility], PAFaraC   insulin glargine 5 Units Subcutaneous HS Felipe Quezada MD   insulin lispro 1-6 Units Subcutaneous TID AC Javy Parsons PA-C   insulin lispro 1-6 Units Subcutaneous HS Javy Parsons PA-C   levothyroxine 125 mcg Oral Early Morning Felipe Quezada MD   lidocaine 1 patch Topical Daily Epifanio MD Brooke   LORazepam 2 mg Oral TID PRN Felipe Quezada MD   metoprolol tartrate 50 mg Oral Q12H [facility]   ondansetron 4 mg Intravenous Q8H PRN Navdeep Rutledge PA-C   pravastatin 80 mg Oral Daily Felipe Quezada MD   predniSONE 5 mg Oral Daily Felipe Quezada MD   promethazine 25 mg Intramuscular Q6H PRN Javy Parsons PA-C   saccharomyces boulardii 250 mg Oral BID Epifanio Balloon, MD sertraline 200 mg Oral Daily Andres Pierce MD   sevelamer 800 mg Oral TID With Meals Andres Pierce MD   sodium bicarbonate 1,300 mg Oral TID Andres Pierce MD   tacrolimus 2 mg Oral HS Jericho Guo MD   tacrolimus 3 mg Oral Daily Jericho Guo MD        Today, Patient Was Seen By: Maverick Kaplan MD    ** Please Note: Dragon 360 Dictation voice to text software may have been used in the creation of this document   **

## 2019-12-03 NOTE — PROGRESS NOTES
Progress Note - Infectious Disease   Violte Small 53 y o  female MRN: [id number]  Unit/Bed#: S -01 Encounter: [id number]      Impression/Recommendations:  1  VRE UTI  Patient is clinically well on IV daptomycin  No evidence of pyelonephritis clinically  Patient has remained systemically well, without sepsis or systemic toxicity  She completed antibiotic course  Observe off antibiotic  Monitor temperature/WBC  Monitor urinary symptoms      2  Diarrhea, most likely secondary to stool softener/laxatives  Diarrhea resolved with discontinuation of laxatives  C diff colitis unlikely with spontaneous resolution of diarrhea  Monitor diarrhea      3  EDUARDO, superimposed on CKD  Creatinine is improved  Monitor      4  Status post kidney and pancreas transplant  Patient is on stable anti rejection regimen      5  Multiple myeloma, on Revlimid      Discussed with patient in detail regarding the above plan  Okay for discharge from ID view      Antibiotics:  Off antibiotic       Subjective:  Patient feels well  No further diarrhea  No abdominal pain or nausea/vomiting  No flank pain  No urinary symptoms  Temperature stays down  No chills  Objective:  Vitals:  Temp:  [98 °F (36 7 °C)-99 6 °F (37 6 °C)] 98 °F (36 7 °C)  HR:  [56-68] 56  Resp:  [18] 18  BP: (148-164)/(48-56) 148/56  SpO2:  [90 %-95 %] 90 %  Temp (24hrs), Av °F (37 2 °C), Min:98 °F (36 7 °C), Max:99 6 °F (37 6 °C)  Current: Temperature: 98 °F (36 7 °C)    Physical Exam:     General: Awake, alert, cooperative, no distress  Neck:  Supple  No mass  No lymphadenopathy  Lungs: Expansion symmetric, no rales, no wheezing, respirations unlabored  Heart:  Regular rate and rhythm, S1 and S2 normal, no murmur  Abdomen: Soft, nondistended, non-tender, bowel sounds active all four quadrants,        no masses, no organomegaly  Extremities: Trace edema  No erythema/warmth  No ulcer  Nontender to palpation  Skin:  No rash  Neuro:  Moves all extremities  Invasive Devices     Peripheral Intravenous Line            Peripheral IV 11/29/19 Right Arm 4 days                Labs studies:   I have personally reviewed pertinent labs  Results from last 7 days   Lab Units 12/03/19  0606 12/02/19 0049 11/30/19 0442   POTASSIUM mmol/L 3 5 3 7 4 4   CHLORIDE mmol/L 109* 108 110*   CO2 mmol/L 17* 19* 18*   BUN mg/dL 54* 47* 41*   CREATININE mg/dL 3 33* 2 64* 2 71*   EGFR ml/min/1 73sq m 15 20 19   CALCIUM mg/dL 8 5 9 0 9 0   AST U/L 10 10  --    ALT U/L 11* 12  --    ALK PHOS U/L 79 87  --      Results from last 7 days   Lab Units 12/03/19  0611 12/02/19 0049 11/30/19 0442 11/27/19  0611   WBC Thousand/uL 5 96 8 02  --   --   --    HEMOGLOBIN g/dL 7 2* 8 5* 7 9*   < > 7 8*   PLATELETS Thousands/uL 136* 141*  --   --  145*    < > = values in this interval not displayed  Imaging Studies:   I have personally reviewed pertinent imaging study reports and images in PACS  EKG, Pathology, and Other Studies:   I have personally reviewed pertinent reports

## 2019-12-03 NOTE — PLAN OF CARE
Problem: PHYSICAL THERAPY ADULT  Goal: Performs mobility at highest level of function for planned discharge setting  See evaluation for individualized goals  Description  Treatment/Interventions: Functional transfer training, Elevations, LE strengthening/ROM, Therapeutic exercise, Endurance training, Bed mobility, Gait training, Patient/family training, Cognitive reorientation, Equipment eval/education, Spoke to nursing, OT  Equipment Recommended: Yumi Conteh       See flowsheet documentation for full assessment, interventions and recommendations  Outcome: Progressing  Note:   Prognosis: Fair  Problem List: Decreased strength, Decreased range of motion, Decreased endurance, Impaired balance, Decreased mobility, Decreased coordination, Impaired sensation, Obesity  Assessment: Patient agreeable and motivated to participate in therapy session  Reports improvement in abdominal discomfort  Pt remains modified independent for bed mobility supine<>sit transfers  Multiple sit<>stand transfers with supervision and verbal instruction for hand placement  Patient able to ambulate increased gait distance with roller walker and supervision  Gait distance limited secondary to fatigue and reports of LE weakness  Patient able to ascend and descend 6" step with B UE support on roller walker x6 trials with CGA and instruciton for technique  Continue to focus on OOB mobility with progression of ambulation and stair training as appropriate  Barriers to Discharge: Inaccessible home environment  Barriers to Discharge Comments: Comorbidities affecting pt's physical performance at time of assessment include: DM, HTN, limited vision, cancer history and/or treatment and EDUARDO, toe amputation, seizure, renal and pancreas transplant Personal factors affecting pt at time of IE include: steps to enter environment, past experience, inability to perform IADLs and recent fall(s)    Recommendation: Home with family support, Home PT          See flowsheet documentation for full assessment

## 2019-12-03 NOTE — ASSESSMENT & PLAN NOTE
· Status post Renal and pancreatic transplant 1998  · Continue tacrolimus and prednisone    Tacrolimus level is 3 3-which is what her transplant team wants the level to be  · Continue to monitor renal function closely

## 2019-12-03 NOTE — ASSESSMENT & PLAN NOTE
Lab Results   Component Value Date    HGBA1C 5 1 09/09/2019       Recent Labs     12/02/19  1623 12/02/19  2116 12/03/19  0801 12/03/19  1142   POCGLU 119 104 126 132       Blood Sugar Average: Last 72 hrs:  · (P) 120 9  · Continue Lantus 5 units q h s   With correctional scale insulin  · Blood glucose levels are stable and acceptable

## 2019-12-03 NOTE — ASSESSMENT & PLAN NOTE
· Patient had been on stool softener/laxatives and subsequently developed diarrhea  · Abdominal cramps resolved  · Stool C diff negative  Start on Imodium p r n

## 2019-12-03 NOTE — PROGRESS NOTES
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 Secondary rise in creatinine:  Creatinine 3 33 today (2 64)  ? No hydronephrosis on follow-up CT  ? Watery diarrhea, numerous episodes  ? Likely volume depletion  · Workup:    ? Urinalysis 30-50 WBCs, innumerable bacteria  1-2 RBCs, trace protein, large leukocytes  ? Imaging:  CT of the abdomen and pelvis  No transplant hydronephrosis noted  Bladder distention noted with bladder diverticulum likely related to previous surgery  Slight stranding noted left lower quadrant renal transplant  ? CT of the abdomen repeated 12/2/19  No hydronephrosis noted  Native right kidney lower pole lesion increased in size when compared to imaging 2 years ago   · Plan:  ? Start IV fluids isolyte 75 mL an hour  ? Check bladder scan for urinary retention  ? Follow-up ultrasound right native kidney-non emergent  ? Check labs in the a m  2  Chronic kidney disease, stage IV:  Chronic allograft nephropathy  · Nephrologist:  Dr Amanda Oakley  · Baseline creatinine:  2 5  · Urine protein creatinine ratio 2 1 g 11/05/2019  3  Renal/pancreatic transplant:  0 at [facility]  4   Immune suppression:    · On tacrolimus 3 mg in the morning and 2 mg in the evening  · Last tacrolimus level 3 3 on 11/26/2019  · Goal 3-5  · Also on prednisone  4  VRE UTI:  · Id following  · History of recurrent urinary tract infections  · Completed current dose of Daptomycin  5  Urinary retention:    · Follows with Urology  · Moreno catheter removed 11/24/2019 with plan for self catheterization   6  Hypertension:    · History of left subclavian stenosis  · History of orthostasis  · Renal artery stenosis of transplanted kidney on imaging but angio at [facility] was unrevealing  · On multidrug regime:  Amlodipine mg daily, clonidine 0 3 mg every 8 hours, Cardura 4 mg hs, hydralazine 100 mg t i d , metoprolol 50 mg every 12 hours  · Hydralazine increased to 100 mg every 8 hours on 11/27  · Blood pressure acceptable especially in light of low diastolic readings  7  Electrolytes:  · GI losses likely -Potassium level 3 5, replete  8  Low bicarbonate:  · Multifactorial: Bicarbonate level down the 17  Decreasing likely due to GI losses  · Continue sodium bicarbonate tablets 1300 mg t i d   9  Anemia:    · On oral iron replacement, folic acid  · Hemoglobin declined from 8 5 to 7 2  10  CKD MBD:    · On sevelamer 800 mg t i d  With meals  · On vitamin D3 supplement 1000 units 3 times daily  11  Diarrhea:    · CT shows circumferential thickening of the sigmoid colon suspicious for colitis  Stool for C diff pending  12  Failed pancreatic transplant  13  Multiple myeloma on Revlimid       SUBJECTIVE / INTERVAL HISTORY:  Yesterday had crampy abdominal pain with diarrhea  Today has watery diarrhea  Eating and drinking normally  She denies seeing blood in stools      OBJECTIVE:  Current Weight: Weight - Scale: 108 kg (238 lb 12 1 oz)  Vitals:    12/02/19 2302 12/03/19 0540 12/03/19 0600 12/03/19 0700   BP: (!) 160/48 150/54  148/56   BP Location:    Right arm   Pulse: 68   56   Resp:    18   Temp:    98 °F (36 7 °C)   TempSrc:    Oral   SpO2:    90% Weight:   108 kg (238 lb 12 1 oz)    Height:         No intake or output data in the 24 hours ending 12/03/19 1223  General: NAD, comfortably lying in bed  Skin: no rash  Eyes: anicteric sclera  ENT: moist mucous membrane  Neck: supple, no JVD  Chest: CTA b/l, no ronchii, no wheeze, no rubs, no rales    Normal effort  CVS: s1s2, no murmur, no gallop, no rub, regular  Abdomen: soft, nontender, nl sounds  Extremities:  Mild edema lower extremities right greater than left edema  : no hinojosa  Neuro: AAOX3  Psych: normal affect  Medications:    Current Facility-Administered Medications:     acetaminophen (TYLENOL) tablet 650 mg, 650 mg, Oral, Q6H PRN, Javy Parsons PA-C, 650 mg at 12/02/19 0655    amLODIPine (NORVASC) tablet 10 mg, 10 mg, Oral, QAM, Magali Valadez MD, 10 mg at 12/03/19 0911    ARIPiprazole (ABILIFY) tablet 20 mg, 20 mg, Oral, HS, Wilver Jacobson MD, 20 mg at 12/02/19 2255    aspirin (ECOTRIN LOW STRENGTH) EC tablet 81 mg, 81 mg, Oral, Daily, Wilver Jacobson MD, 81 mg at 12/03/19 0911    busPIRone (BUSPAR) tablet 5 mg, 5 mg, Oral, BID, Wilver Jacobson MD, 5 mg at 12/03/19 0912    cholecalciferol (VITAMIN D3) tablet 1,000 Units, 1,000 Units, Oral, TID, Wilver Jacobson MD, 1,000 Units at 12/03/19 0911    cloNIDine (CATAPRES) tablet 0 3 mg, 0 3 mg, Oral, Q8H [address], Wilver Jacobson MD, 0 3 mg at 12/03/19 0540    doxazosin (CARDURA) tablet 4 mg, 4 mg, Oral, HS, [facility], JOHN, 4 mg at 12/02/19 2303    DULoxetine (CYMBALTA) delayed release capsule 60 mg, 60 mg, Oral, BID, Wilver Jacobson MD, 60 mg at 12/03/19 7442    ferrous sulfate tablet 325 mg, 325 mg, Oral, Daily With Breakfast, Wilver Jacobson MD, 325 mg at 42/86/31 8467    folic acid (FOLVITE) tablet 1,000 mcg, 1,000 mcg, Oral, Daily, Wilver Jacobson MD, 1,000 mcg at 12/03/19 0911    heparin (porcine) subcutaneous injection 5,000 Units, 5,000 Units, Subcutaneous, Q8H [address], 5,000 Units at 12/03/19 0540 **AND** [COMPLETED] Platelet count, , , Once, Lesvia Mcdonnell MD    hydrALAZINE (APRESOLINE) injection 5 mg, 5 mg, Intravenous, Q6H PRN, Chyna Guzmán MD, 5 mg at 12/01/19 1006    hydrALAZINE (APRESOLINE) tablet 100 mg, 100 mg, Oral, TID, [facility], PA-C, 100 mg at 12/03/19 0910    insulin glargine (LANTUS) subcutaneous injection 5 Units 0 05 mL, 5 Units, Subcutaneous, HS, Lesvia Mcdonnell MD, 5 Units at 12/02/19 2255    insulin lispro (HumaLOG) 100 units/mL subcutaneous injection 1-6 Units, 1-6 Units, Subcutaneous, TID AC, 1 Units at 12/01/19 1614 **AND** Fingerstick Glucose (POCT), , , TID AC, Javy Parsons PA-C    insulin lispro (HumaLOG) 100 units/mL subcutaneous injection 1-6 Units, 1-6 Units, Subcutaneous, HS, Javy Parsons PA-C, Stopped at 11/23/19 2147    levothyroxine tablet 125 mcg, 125 mcg, Oral, Early Morning, Lesvia Mcdonnell MD, 125 mcg at 12/03/19 0540    lidocaine (LIDODERM) 5 % patch 1 patch, 1 patch, Topical, Daily, Adonna Gitelman, MD, 1 patch at 12/03/19 0909    LORazepam (ATIVAN) tablet 2 mg, 2 mg, Oral, TID PRN, Lesvia Mcdonnell MD, 2 mg at 12/02/19 0811    metoprolol tartrate (LOPRESSOR) tablet 50 mg, 50 mg, Oral, Q12H [facility], Leanne [address], PAFaraC, 50 mg at 12/03/19 0912    ondansetron (ZOFRAN) injection 4 mg, 4 mg, Intravenous, Q8H PRN, Gregoria Mckeon PA-C, 4 mg at 12/02/19 0645    pravastatin (PRAVACHOL) tablet 80 mg, 80 mg, Oral, Daily, Lesvia Mcdonnell MD, 80 mg at 12/03/19 0911    predniSONE tablet 5 mg, 5 mg, Oral, Daily, Lesvia Mcdonnell MD, 5 mg at 12/03/19 0912    promethazine (PHENERGAN) injection 25 mg, 25 mg, Intramuscular, Q6H PRN, Javy Parsons PA-C    saccharomyces boulardii (FLORASTOR) capsule 250 mg, 250 mg, Oral, BID, Adonna Gitelman, MD, 250 mg at 12/03/19 0911    sertraline (ZOLOFT) tablet 200 mg, 200 mg, Oral, Daily, Lesvia Mcdonnell MD, 200 mg at 12/03/19 0911    sevelamer (RENAGEL) tablet 800 mg, 800 mg, Oral, TID With Meals, Lesvia Mcdonnell, MD, 800 mg at 19 0912    sodium bicarbonate tablet 1,300 mg, 1,300 mg, Oral, TID, Felipe Quezada MD, 1,300 mg at 19 0911    tacrolimus (PROGRAF) capsule 2 mg, 2 mg, Oral, HS, Sharlene Staples MD, 2 mg at 19 4961    tacrolimus (PROGRAF) capsule 3 mg, 3 mg, Oral, Daily, Sharlene Staples MD, 3 mg at 19 7542    Laboratory Results:  Results from last 7 days   Lab Units 19  0611 19  0606 19  0049 19  0442 19  0516 19  0511 19  0611 19  0454   WBC Thousand/uL 5 96  --  8 02  --   --   --   --   --    HEMOGLOBIN g/dL 7 2*  --  8 5* 7 9* 8 1*  --  7 8*  --    HEMATOCRIT % 24 0*  --  27 0* 26 1* 26 6*  --  25 1*  --    PLATELETS Thousands/uL 136*  --  141*  --   --   --  145*  --    POTASSIUM mmol/L  --  3 5 3 7 4 4  --  4 0  --  4 0   CHLORIDE mmol/L  --  109* 108 110*  --  110*  --  111*   CO2 mmol/L  --  17* 19* 18*  --  18*  --  18*   BUN mg/dL  --  54* 47* 41*  --  44*  --  44*   CREATININE mg/dL  --  3 33* 2 64* 2 71*  --  2 81*  --  3 03*   CALCIUM mg/dL  --  8 5 9 0 9 0  --  8 8  --  8 1*   PHOSPHORUS mg/dL  --   --   --   --   --  4 7*  --   --

## 2019-12-03 NOTE — PHYSICAL THERAPY NOTE
PHYSICAL THERAPY NOTE    Patient Name: Chi Morales  XRUQT'S Date: 12/3/2019        12/03/19 1247   Pain Assessment   Pain Assessment No/denies pain   Pain Score No Pain   Restrictions/Precautions   Weight Bearing Precautions Per Order No   Other Precautions Contact/isolation; Bed Alarm; Chair Alarm; Fall Risk   General   Family/Caregiver Present No   Subjective   Subjective Patient supine in bed and is agreeable to participate in therapy session  Patient identifers obtained from name &   Bed Mobility   Supine to Sit 6  Modified independent   Additional items HOB elevated   Sit to Supine 6  Modified independent   Additional items HOB elevated; Increased time required   Transfers   Sit to Stand 5  Supervision   Additional items Assist x 1; Increased time required;Verbal cues   Stand to Sit 5  Supervision   Additional items Assist x 1; Armrests; Increased time required;Verbal cues   Ambulation/Elevation   Gait pattern Step to;Excessively slow; Short stride; Shuffling   Gait Assistance 5  Supervision   Additional items Assist x 1;Verbal cues   Assistive Device Rolling walker   Distance 60' x2   Stair Management Assistance 4  Minimal assist  (CGA)   Additional items Assist x 1;Verbal cues; Increased time required   Stair Management Technique   (6" step ups with B UE support on roller walker)   Balance   Static Sitting Normal   Static Standing Fair   Ambulatory Fair   Endurance Deficit   Endurance Deficit Yes   Endurance Deficit Description limited ambulation distance with reported LE fatigue   Activity Tolerance   Activity Tolerance Patient tolerated treatment well;Patient limited by fatigue   Nurse Made Aware Spoke to Yuki, RN    Assessment   Prognosis Fair   Problem List Decreased strength;Decreased range of motion;Decreased endurance; Impaired balance;Decreased mobility; Decreased coordination; Impaired sensation;Obesity   Assessment Patient agreeable and motivated to participate in therapy session  Reports improvement in abdominal discomfort  Pt remains modified independent for bed mobility supine<>sit transfers  Multiple sit<>stand transfers with supervision and verbal instruction for hand placement  Patient able to ambulate increased gait distance with roller walker and supervision  Gait distance limited secondary to fatigue and reports of LE weakness  Patient able to ascend and descend 6" step with B UE support on roller walker x6 trials with CGA and instruciton for technique  Continue to focus on OOB mobility with progression of ambulation and stair training as appropriate  Barriers to Discharge Inaccessible home environment   Goals   Patient Goals none stated   STG Expiration Date 12/09/19   PT Treatment Day 1   Plan   Treatment/Interventions Functional transfer training;Elevations;LE strengthening/ROM; Therapeutic exercise; Endurance training;Bed mobility;Gait training;Patient/family training;Cognitive reorientation;Equipment eval/education;Spoke to nursing   PT Frequency 5x/wk   Recommendation   Recommendation Home with family support;Home PT   Equipment Recommended Unknown [place], PTA

## 2019-12-03 NOTE — ASSESSMENT & PLAN NOTE
· Admitted with dysuria, generalized weakness/fatigue    Urine cultures positive for VRE (Enterococcus fecium)  · Overall clinically improved and is status post completion of 7 days course IV daptomycin  · Continue to monitor off antibiotics at this time  · She remains afebrile and without leukocytosis

## 2019-12-03 NOTE — ASSESSMENT & PLAN NOTE
· Baseline creatinine is 2 4-2 8  Presented with creatinine elevated at 3 15  · Creatinine initially improved to baseline at 2 64 however has become elevated again to 3 39 today in the setting of diarrhea  · Received gentle hydration yesterday but no significant improvement in renal function this morning  · Nephrology following    Patient recommended to be observed 1 more day to ensure renal stability

## 2019-12-03 NOTE — ASSESSMENT & PLAN NOTE
· Anemia of chronic renal disease with baseline hemoglobin around 7 3  · She remains hemodynamically stable  · Continue ferrous sulfate, folic acid

## 2019-12-04 LAB
ALBUMIN SERPL BCP-MCNC: 2.5 G/DL (ref 3.5–5)
ANION GAP SERPL CALCULATED.3IONS-SCNC: 14 MMOL/L (ref 4–13)
BUN SERPL-MCNC: 56 MG/DL (ref 5–25)
C DIFF TOX GENS STL QL NAA+PROBE: NORMAL
CALCIUM SERPL-MCNC: 8.4 MG/DL (ref 8.3–10.1)
CHLORIDE SERPL-SCNC: 108 MMOL/L (ref 100–108)
CO2 SERPL-SCNC: 17 MMOL/L (ref 21–32)
CREAT SERPL-MCNC: 3.39 MG/DL (ref 0.6–1.3)
GFR SERPL CREATININE-BSD FRML MDRD: 15 ML/MIN/1.73SQ M
GLUCOSE SERPL-MCNC: 102 MG/DL (ref 65–140)
GLUCOSE SERPL-MCNC: 104 MG/DL (ref 65–140)
GLUCOSE SERPL-MCNC: 109 MG/DL (ref 65–140)
GLUCOSE SERPL-MCNC: 125 MG/DL (ref 65–140)
GLUCOSE SERPL-MCNC: 143 MG/DL (ref 65–140)
PHOSPHATE SERPL-MCNC: 4.7 MG/DL (ref 2.7–4.5)
POTASSIUM SERPL-SCNC: 3.8 MMOL/L (ref 3.5–5.3)
SODIUM SERPL-SCNC: 139 MMOL/L (ref 136–145)

## 2019-12-04 PROCEDURE — 82948 REAGENT STRIP/BLOOD GLUCOSE: CPT

## 2019-12-04 PROCEDURE — 99232 SBSQ HOSP IP/OBS MODERATE 35: CPT | Performed by: INTERNAL MEDICINE

## 2019-12-04 PROCEDURE — 80069 RENAL FUNCTION PANEL: CPT | Performed by: NURSE PRACTITIONER

## 2019-12-04 RX ORDER — LOPERAMIDE HYDROCHLORIDE 2 MG/1
2 CAPSULE ORAL 4 TIMES DAILY PRN
Status: DISCONTINUED | OUTPATIENT
Start: 2019-12-04 | End: 2019-12-06 | Stop reason: HOSPADM

## 2019-12-04 RX ADMIN — BUSPIRONE HYDROCHLORIDE 5 MG: 5 TABLET ORAL at 08:32

## 2019-12-04 RX ADMIN — LEVOTHYROXINE SODIUM 125 MCG: 125 TABLET ORAL at 05:39

## 2019-12-04 RX ADMIN — CLONIDINE HYDROCHLORIDE 0.3 MG: 0.1 TABLET ORAL at 21:24

## 2019-12-04 RX ADMIN — LOPERAMIDE HYDROCHLORIDE 2 MG: 2 CAPSULE ORAL at 14:10

## 2019-12-04 RX ADMIN — CLONIDINE HYDROCHLORIDE 0.3 MG: 0.1 TABLET ORAL at 05:38

## 2019-12-04 RX ADMIN — SEVELAMER HYDROCHLORIDE 800 MG: 800 TABLET, FILM COATED PARENTERAL at 17:34

## 2019-12-04 RX ADMIN — LORAZEPAM 2 MG: 1 TABLET ORAL at 08:31

## 2019-12-04 RX ADMIN — HEPARIN SODIUM 5000 UNITS: 5000 INJECTION INTRAVENOUS; SUBCUTANEOUS at 05:38

## 2019-12-04 RX ADMIN — SERTRALINE HYDROCHLORIDE 200 MG: 100 TABLET ORAL at 08:31

## 2019-12-04 RX ADMIN — SODIUM CHLORIDE, SODIUM GLUCONATE, SODIUM ACETATE, POTASSIUM CHLORIDE AND MAGNESIUM CHLORIDE 75 ML/HR: 526; 502; 368; 37; 30 INJECTION, SOLUTION INTRAVENOUS at 17:37

## 2019-12-04 RX ADMIN — ARIPIPRAZOLE 20 MG: 5 TABLET ORAL at 21:24

## 2019-12-04 RX ADMIN — HEPARIN SODIUM 5000 UNITS: 5000 INJECTION INTRAVENOUS; SUBCUTANEOUS at 21:22

## 2019-12-04 RX ADMIN — METOPROLOL TARTRATE 50 MG: 50 TABLET, FILM COATED ORAL at 21:25

## 2019-12-04 RX ADMIN — HYDRALAZINE HYDROCHLORIDE 100 MG: 25 TABLET ORAL at 17:33

## 2019-12-04 RX ADMIN — DULOXETINE HYDROCHLORIDE 60 MG: 60 CAPSULE, DELAYED RELEASE ORAL at 17:33

## 2019-12-04 RX ADMIN — Medication 250 MG: at 08:31

## 2019-12-04 RX ADMIN — SODIUM BICARBONATE 650 MG TABLET 1300 MG: at 08:31

## 2019-12-04 RX ADMIN — METOPROLOL TARTRATE 50 MG: 50 TABLET, FILM COATED ORAL at 08:32

## 2019-12-04 RX ADMIN — CLONIDINE HYDROCHLORIDE 0.3 MG: 0.1 TABLET ORAL at 14:10

## 2019-12-04 RX ADMIN — DULOXETINE HYDROCHLORIDE 60 MG: 60 CAPSULE, DELAYED RELEASE ORAL at 08:31

## 2019-12-04 RX ADMIN — HYDRALAZINE HYDROCHLORIDE 100 MG: 25 TABLET ORAL at 21:23

## 2019-12-04 RX ADMIN — HYDRALAZINE HYDROCHLORIDE 100 MG: 25 TABLET ORAL at 08:31

## 2019-12-04 RX ADMIN — CHOLECALCIFEROL TAB 25 MCG (1000 UNIT) 1000 UNITS: 25 TAB at 17:33

## 2019-12-04 RX ADMIN — SODIUM BICARBONATE 650 MG TABLET 1300 MG: at 21:24

## 2019-12-04 RX ADMIN — SEVELAMER HYDROCHLORIDE 800 MG: 800 TABLET, FILM COATED PARENTERAL at 08:32

## 2019-12-04 RX ADMIN — FERROUS SULFATE TAB 325 MG (65 MG ELEMENTAL FE) 325 MG: 325 (65 FE) TAB at 08:31

## 2019-12-04 RX ADMIN — DOXAZOSIN 4 MG: 4 TABLET ORAL at 21:24

## 2019-12-04 RX ADMIN — TACROLIMUS 2 MG: 1 CAPSULE ORAL at 21:22

## 2019-12-04 RX ADMIN — CHOLECALCIFEROL TAB 25 MCG (1000 UNIT) 1000 UNITS: 25 TAB at 08:31

## 2019-12-04 RX ADMIN — SEVELAMER HYDROCHLORIDE 800 MG: 800 TABLET, FILM COATED PARENTERAL at 12:30

## 2019-12-04 RX ADMIN — HEPARIN SODIUM 5000 UNITS: 5000 INJECTION INTRAVENOUS; SUBCUTANEOUS at 14:11

## 2019-12-04 RX ADMIN — TACROLIMUS 3 MG: 1 CAPSULE ORAL at 08:31

## 2019-12-04 RX ADMIN — INSULIN GLARGINE 5 UNITS: 100 INJECTION, SOLUTION SUBCUTANEOUS at 21:22

## 2019-12-04 RX ADMIN — LOPERAMIDE HYDROCHLORIDE 2 MG: 2 CAPSULE ORAL at 17:33

## 2019-12-04 RX ADMIN — FOLIC ACID 1000 MCG: 1 TABLET ORAL at 08:32

## 2019-12-04 RX ADMIN — LORAZEPAM 2 MG: 1 TABLET ORAL at 14:10

## 2019-12-04 RX ADMIN — PRAVASTATIN SODIUM 80 MG: 80 TABLET ORAL at 08:32

## 2019-12-04 RX ADMIN — SODIUM BICARBONATE 650 MG TABLET 1300 MG: at 17:33

## 2019-12-04 RX ADMIN — BUSPIRONE HYDROCHLORIDE 5 MG: 5 TABLET ORAL at 17:33

## 2019-12-04 RX ADMIN — ASPIRIN 81 MG: 81 TABLET, COATED ORAL at 08:31

## 2019-12-04 RX ADMIN — CHOLECALCIFEROL TAB 25 MCG (1000 UNIT) 1000 UNITS: 25 TAB at 21:22

## 2019-12-04 RX ADMIN — PREDNISONE 5 MG: 5 TABLET ORAL at 08:32

## 2019-12-04 RX ADMIN — AMLODIPINE BESYLATE 10 MG: 10 TABLET ORAL at 08:31

## 2019-12-04 RX ADMIN — SODIUM CHLORIDE, SODIUM GLUCONATE, SODIUM ACETATE, POTASSIUM CHLORIDE AND MAGNESIUM CHLORIDE 75 ML/HR: 526; 502; 368; 37; 30 INJECTION, SOLUTION INTRAVENOUS at 04:11

## 2019-12-04 RX ADMIN — Medication 250 MG: at 17:33

## 2019-12-04 NOTE — PLAN OF CARE
Problem: Potential for Falls  Goal: Patient will remain free of falls  Description  INTERVENTIONS:  - Assess patient frequently for physical needs  -  Identify cognitive and physical deficits and behaviors that affect risk of falls    -  Hay fall precautions as indicated by assessment   - Educate patient/family on patient safety including physical limitations  - Instruct patient to call for assistance with activity based on assessment  - Modify environment to reduce risk of injury  - Consider OT/PT consult to assist with strengthening/mobility  Outcome: Progressing     Problem: PAIN - ADULT  Goal: Verbalizes/displays adequate comfort level or baseline comfort level  Description  Interventions:  - Encourage patient to monitor pain and request assistance  - Assess pain using appropriate pain scale  - Administer analgesics based on type and severity of pain and evaluate response  - Implement non-pharmacological measures as appropriate and evaluate response  - Consider cultural and social influences on pain and pain management  - Notify physician/advanced practitioner if interventions unsuccessful or patient reports new pain  Outcome: Progressing     Problem: INFECTION - ADULT  Goal: Absence or prevention of progression during hospitalization  Description  INTERVENTIONS:  - Assess and monitor for signs and symptoms of infection  - Monitor lab/diagnostic results  - Monitor all insertion sites, i e  indwelling lines, tubes, and drains  - Monitor endotracheal if appropriate and nasal secretions for changes in amount and color  - Hay appropriate cooling/warming therapies per order  - Administer medications as ordered  - Instruct and encourage patient and family to use good hand hygiene technique  - Identify and instruct in appropriate isolation precautions for identified infection/condition  Outcome: Progressing     Problem: GENITOURINARY - ADULT  Goal: Maintains or returns to baseline urinary function  Description  INTERVENTIONS:  - Assess urinary function  - Encourage oral fluids to ensure adequate hydration if ordered  - Administer IV fluids as ordered to ensure adequate hydration  - Administer ordered medications as needed  - Offer frequent toileting  - Follow urinary retention protocol if ordered  Outcome: Progressing  Goal: Absence of urinary retention  Description  INTERVENTIONS:  - Assess patients ability to void and empty bladder  - Monitor I/O  - Bladder scan as needed  - Discuss with physician/AP medications to alleviate retention as needed  - Discuss catheterization for long term situations as appropriate  Outcome: Progressing     Problem: Nutrition/Hydration-ADULT  Goal: Nutrient/Hydration intake appropriate for improving, restoring or maintaining nutritional needs  Description  Monitor and assess patient's nutrition/hydration status for malnutrition  Collaborate with interdisciplinary team and initiate plan and interventions as ordered  Monitor patient's weight and dietary intake as ordered or per policy  Utilize nutrition screening tool and intervene as necessary  Determine patient's food preferences and provide high-protein, high-caloric foods as appropriate       INTERVENTIONS:  - Monitor oral intake, urinary output, labs, and treatment plans  - Assess nutrition and hydration status and recommend course of action  - Evaluate amount of meals eaten  - Assist patient with eating if necessary   - Allow adequate time for meals  - Recommend/ encourage appropriate diets, oral nutritional supplements, and vitamin/mineral supplements  - Order, calculate, and assess calorie counts as needed  - Recommend, monitor, and adjust tube feedings and TPN/PPN based on assessed needs  - Assess need for intravenous fluids  - Provide specific nutrition/hydration education as appropriate  - Include patient/family/caregiver in decisions related to nutrition  Outcome: Progressing

## 2019-12-04 NOTE — PROGRESS NOTES
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 Secondary rise in creatinine:  Creatinine 3 33 on 12/3 in the setting of diarrhea  IV fluids initiated  Today creatinine 3 39    ? No hydronephrosis on follow-up CT  ? Likely volume depletion due to diarrhea  · Workup:    ? Urinalysis 30-50 WBCs, innumerable bacteria   1-2 RBCs, trace protein, large leukocytes  ? Imaging:  CT of the abdomen and pelvis  No transplant hydronephrosis noted   Bladder distention noted with bladder diverticulum likely related to previous surgery   Slight stranding noted left lower quadrant renal transplant  ? CT of the abdomen repeated 12/2/19  No hydronephrosis noted  Native right kidney lower pole lesion increased in size when compared to imaging 2 years ago   · Plan:  ? Continue IV fluids, check labs in the a m   ? Hopefully renal function will improve since diarrhea has ceased and patient has been receiving hydration  ? Patient hopeful to be able to go home tomorrow  ? Follow-up ultrasound right native kidney- non emergent to monitor right lower pole lesion  2   Chronic kidney disease, stage IV:  Chronic allograft nephropathy  · Nephrologist:    RONY Gusman  · Baseline creatinine:  2 5  · Urine protein creatinine ratio 2 1 g 11/05/2019  3  Renal/pancreatic transplant:  0 at [facility]  4  Immune suppression:    · On tacrolimus 3 mg in the morning and 2 mg in the evening  · Last tacrolimus level 3 3 on 11/26/2019  · Goal 3-5  · Also on prednisone  4  VRE UTI:  · ID following  · History of recurrent urinary tract infections  · Completed Daptomycin  5  Urinary retention:    · Follows with Urology     · Moreno catheter removed 11/24/2019 with plan for self catheterization   6  Hypertension:    · History of left subclavian stenosis  · History of orthostasis  · Renal artery stenosis of transplanted kidney on imaging but angio at [facility] was unrevealing    · On multidrug regime:  Amlodipine mg daily, clonidine 0 3 mg every 8 hours, Cardura 4 mg hs, hydralazine 100 mg t i d , metoprolol 50 mg every 12 hours  · Hydralazine increased to 100 mg every 8 hours on 11/27  · Blood pressure acceptable  7  Electrolytes:  · Hypokalemia resolved  8  Low bicarbonate:  · Multifactorial:  GI losses, sequela of previous pancreatic surgery  · Continue sodium bicarbonate tablets 1300 mg t i d   9  Anemia:    · On oral iron replacement, folic acid  · Hemoglobin declined from 8 5 to 7 2  · Check labs in the a m   10  CKD MBD:    · On sevelamer 800 mg t i d  With meals  · On vitamin D3 supplement 1000 units 3 times daily  11  Diarrhea:    · CT shows circumferential thickening of the sigmoid colon suspicious for colitis  Stool for C diff negative  12  Failed pancreatic transplant  13  Multiple myeloma on Revlimid    SUBJECTIVE / INTERVAL HISTORY:  Disappointed that discharge delayed  States she is feeling better  No diarrhea      OBJECTIVE:  Current Weight: Weight - Scale: 108 kg (238 lb 12 1 oz)  Vitals:    12/03/19 2300 12/04/19 0538 12/04/19 0725 12/04/19 1534   BP: 144/86 141/85 162/56 147/65   BP Location: Left arm  Right arm Left arm   Pulse: 55  68 69   Resp: 20 18 18   Temp: 98 2 °F (36 8 °C)  97 8 °F (36 6 °C) 98 °F (36 7 °C)   TempSrc: Oral  Oral Oral   SpO2: 93%  96% 96%   Weight:       Height:           Intake/Output Summary (Last 24 hours) at 12/4/2019 1604  Last data filed at 12/4/2019 1534  Gross per 24 hour   Intake 1822 5 ml   Output 1200 ml   Net 622 5 ml     General: NAD, comfortably lying in bed  Skin: no rash  Eyes: anicteric sclera  ENT: moist mucous membrane  Neck: supple  Chest: CTA b/l, no ronchii, no wheeze, no rubs, no rales  Normal effort  CVS: s1s2, no murmur, no gallop, no rub    Regular  Abdomen: soft, nontender, nl sounds  Extremities: no edema LE b/l  : no hinojosa  Neuro: AAOX3  Psych: normal affect  Medications:    Current Facility-Administered Medications:     acetaminophen (TYLENOL) tablet 650 mg, 650 mg, Oral, Q6H PRN, Javy Parsons PA-C, 650 mg at 12/02/19 0655    amLODIPine (NORVASC) tablet 10 mg, 10 mg, Oral, QAM, Renu Regalado MD, 10 mg at 12/04/19 0831    ARIPiprazole (ABILIFY) tablet 20 mg, 20 mg, Oral, HS, Shade Kay MD, 20 mg at 12/03/19 2214    aspirin (ECOTRIN LOW STRENGTH) EC tablet 81 mg, 81 mg, Oral, Daily, Shade Kay MD, 81 mg at 12/04/19 0831    busPIRone (BUSPAR) tablet 5 mg, 5 mg, Oral, BID, Shade Kay MD, 5 mg at 12/04/19 5211    cholecalciferol (VITAMIN D3) tablet 1,000 Units, 1,000 Units, Oral, TID, Shade Kay MD, 1,000 Units at 12/04/19 0831    cloNIDine (CATAPRES) tablet 0 3 mg, 0 3 mg, Oral, Q8H [address], Shade Kay MD, 0 3 mg at 12/04/19 1410    doxazosin (CARDURA) tablet 4 mg, 4 mg, Oral, HS, [facility], PA-C, 4 mg at 12/03/19 2215    DULoxetine (CYMBALTA) delayed release capsule 60 mg, 60 mg, Oral, BID, Shade Kay MD, 60 mg at 12/04/19 0831    ferrous sulfate tablet 325 mg, 325 mg, Oral, Daily With Breakfast, Shade Kay MD, 325 mg at 34/79/92 8030    folic acid (FOLVITE) tablet 1,000 mcg, 1,000 mcg, Oral, Daily, Shade Kay MD, 1,000 mcg at 12/04/19 0832    heparin (porcine) subcutaneous injection 5,000 Units, 5,000 Units, Subcutaneous, Q8H [address], 5,000 Units at 12/04/19 1411 **AND** [COMPLETED] Platelet count, , , Once, Wilver Jacobson MD    hydrALAZINE (APRESOLINE) injection 5 mg, 5 mg, Intravenous, Q6H PRN, Rod Herron MD, 5 mg at 12/01/19 1006    hydrALAZINE (APRESOLINE) tablet 100 mg, 100 mg, Oral, TID, [facility], [facility], 100 mg at 12/04/19 0831    insulin glargine (LANTUS) subcutaneous injection 5 Units 0 05 mL, 5 Units, Subcutaneous, HS, Wilver Jacobson MD, 5 Units at 12/03/19 2227    insulin lispro (HumaLOG) 100 units/mL subcutaneous injection 1-6 Units, 1-6 Units, Subcutaneous, TID AC, 1 Units at 12/01/19 1614 **AND** Fingerstick Glucose (POCT), , , TID AC, Javy Parsons PA-C    insulin lispro (HumaLOG) 100 units/mL subcutaneous injection 1-6 Units, 1-6 Units, Subcutaneous, HS, Javy Parsons PA-C, Stopped at 11/23/19 2147    levothyroxine tablet 125 mcg, 125 mcg, Oral, Early Morning, Wilver Jacobson MD, 125 mcg at 12/04/19 0539    lidocaine (LIDODERM) 5 % patch 1 patch, 1 patch, Topical, Daily, Hira Matias MD, Stopped at 12/04/19 0831    loperamide (IMODIUM) capsule 2 mg, 2 mg, Oral, 4x Daily PRN, Brittnee Edmondson MD, 2 mg at 12/04/19 1410    LORazepam (ATIVAN) tablet 2 mg, 2 mg, Oral, TID PRN, Wilver Jacobson MD, 2 mg at 12/04/19 1410    metoprolol tartrate (LOPRESSOR) tablet 50 mg, 50 mg, Oral, Q12H [address], [facility], [facility], 50 mg at 12/04/19 4455    multi-electrolyte (PLASMALYTE-A/ISOLYTE-S PH 7 4) IV solution, 75 mL/hr, Intravenous, Continuous, MER Mak, Last Rate: 75 mL/hr at 12/04/19 0411, 75 mL/hr at 12/04/19 0411    ondansetron (ZOFRAN) injection 4 mg, 4 mg, Intravenous, Q8H PRN, Faisal Jaquez PA-C, 4 mg at 12/02/19 0645    pravastatin (PRAVACHOL) tablet 80 mg, 80 mg, Oral, Daily, Wilver Jacobson MD, 80 mg at 12/04/19 9066    predniSONE tablet 5 mg, 5 mg, Oral, Daily, Wilver Jacobson MD, 5 mg at 12/04/19 5927    promethazine (PHENERGAN) injection 25 mg, 25 mg, Intramuscular, Q6H PRN, Ellen Prather PA-C    saccharomyces boulardii (FLORASTOR) capsule 250 mg, 250 mg, Oral, BID, Angie Fairchild MD, 250 mg at 12/04/19 0831    sertraline (ZOLOFT) tablet 200 mg, 200 mg, Oral, Daily, Maverick Martinez MD, 200 mg at 12/04/19 0831    sevelamer (RENAGEL) tablet 800 mg, 800 mg, Oral, TID With Meals, Maverick Martinez MD, 800 mg at 12/04/19 1230    sodium bicarbonate tablet 1,300 mg, 1,300 mg, Oral, TID, Maverick Martinez MD, 1,300 mg at 12/04/19 0831    tacrolimus (PROGRAF) capsule 2 mg, 2 mg, Oral, HS, Radha Hammer MD, 2 mg at 12/03/19 2209    tacrolimus (PROGRAF) capsule 3 mg, 3 mg, Oral, Daily, Radha Hammer MD, 3 mg at 12/04/19 0831    Laboratory Results:  Results from last 7 days   Lab Units 12/04/19  0530 12/03/19  0611 12/03/19  0606 12/02/19  0049 11/30/19  0442 11/29/19  0516 11/28/19  0511   WBC Thousand/uL  --  5 96  --  8 02  --   --   --    HEMOGLOBIN g/dL  --  7 2*  --  8 5* 7 9* 8 1*  --    HEMATOCRIT %  --  24 0*  --  27 0* 26 1* 26 6*  --    PLATELETS Thousands/uL  --  136*  --  141*  --   --   --    POTASSIUM mmol/L 3 8  --  3 5 3 7 4 4  --  4 0   CHLORIDE mmol/L 108  --  109* 108 110*  --  110*   CO2 mmol/L 17*  --  17* 19* 18*  --  18*   BUN mg/dL 56*  --  54* 47* 41*  --  44*   CREATININE mg/dL 3 39*  --  3 33* 2 64* 2 71*  --  2 81*   CALCIUM mg/dL 8 4  --  8 5 9 0 9 0  --  8 8   PHOSPHORUS mg/dL 4 7*  --   --   --   --   --  4 7*

## 2019-12-04 NOTE — PROGRESS NOTES
Progress Note - Infectious Disease   Brien Willett 53 y o  female MRN: [id number]  Unit/Bed#: S -01 Encounter: [id number]      Impression/Recommendations:  1  VRE UTI   Patient is clinically well on IV daptomycin   No evidence of pyelonephritis clinically   Patient has remained systemically well, without sepsis or systemic toxicity  She completed antibiotic course  Observe off antibiotic  Monitor temperature/WBC  Monitor urinary symptoms      2  Diarrhea, most likely secondary to stool softener/laxatives  Diarrhea persisted yesterday but appears to have resolved now  C diff colitis unlikely with spontaneous resolution of diarrhea  Monitor diarrhea  Follow-up on stool C diff toxin      3  EDUARDO, superimposed on CKD   Creatinine is improved  Monitor      4  Status post kidney and pancreas transplant   Patient is on stable anti rejection regimen      5  Multiple myeloma, on Revlimid      Discussed with patient in detail regarding the above plan      Antibiotics:  Off antibiotic       Subjective:  Patient  has diarrhea again yesterday afternoon  However, no further bowel movements since last night  No abdominal pain or nausea/vomiting   No flank pain  No urinary symptoms  Temperature stays down   No chills  Objective:  Vitals:  Temp:  [97 8 °F (36 6 °C)-98 2 °F (36 8 °C)] 97 8 °F (36 6 °C)  HR:  [55-68] 68  Resp:  [18-20] 18  BP: (141-162)/(52-86) 162/56  SpO2:  [92 %-96 %] 96 %  Temp (24hrs), Av 9 °F (36 6 °C), Min:97 8 °F (36 6 °C), Max:98 2 °F (36 8 °C)  Current: Temperature: 97 8 °F (36 6 °C)    Physical Exam:     General: Awake, alert, cooperative, no distress  Neck:  Supple  No mass  No lymphadenopathy  Lungs: Expansion symmetric, no rales, no wheezing, respirations unlabored  Heart:  Regular rate and rhythm, S1 and S2 normal, no murmur  Abdomen: Soft, nondistended, non-tender, bowel sounds active all four quadrants,        no masses, no organomegaly     Extremities: Stable leg edema  No erythema/warmth  No ulcer  Nontender to palpation  Skin:  No rash  Neuro: Moves all extremities  Invasive Devices     Peripheral Intravenous Line            Peripheral IV 12/04/19 Left;Ventral (anterior) Forearm less than 1 day                Labs studies:   I have personally reviewed pertinent labs  Results from last 7 days   Lab Units 12/04/19  0530 12/03/19  0606 12/02/19  0049   POTASSIUM mmol/L 3 8 3 5 3 7   CHLORIDE mmol/L 108 109* 108   CO2 mmol/L 17* 17* 19*   BUN mg/dL 56* 54* 47*   CREATININE mg/dL 3 39* 3 33* 2 64*   EGFR ml/min/1 73sq m 15 15 20   CALCIUM mg/dL 8 4 8 5 9 0   AST U/L  --  10 10   ALT U/L  --  11* 12   ALK PHOS U/L  --  79 87     Results from last 7 days   Lab Units 12/03/19  0611 12/02/19  0049 11/30/19  0442   WBC Thousand/uL 5 96 8 02  --    HEMOGLOBIN g/dL 7 2* 8 5* 7 9*   PLATELETS Thousands/uL 136* 141*  --            Imaging Studies:   I have personally reviewed pertinent imaging study reports and images in PACS  EKG, Pathology, and Other Studies:   I have personally reviewed pertinent reports

## 2019-12-04 NOTE — SOCIAL WORK
CM met with patient at bedside to discuss IMM and discharge planning  Patient is set up with [facility] for SN and PT  IMM reviewed and a copy was provided to the patient  Patient reports that she already has a walker and cane at home  Patient denies any other discharge needs and reports that her parents will transport home  Patient is hoping to be able to get down to [facility] to spend time with sister and family

## 2019-12-04 NOTE — PROGRESS NOTES
Progress Note - Grace Elder 1965, 53 y o  female MRN: [id number]    Unit/Bed#: S -01 Encounter: [id number]    Primary Care Provider: Lois Campo MD   Date and time admitted to hospital: 11/23/2019  2:47 PM    * Acute cystitis without hematuria secondary to VRE infection  Assessment & Plan  · Admitted with dysuria, generalized weakness/fatigue  Urine cultures positive for VRE (Enterococcus fecium)  · Overall clinically improved and is status post completion of 7 days course IV daptomycin  · Continue to monitor off antibiotics at this time  · She remains afebrile and without leukocytosis    Diarrhea  Assessment & Plan  · Patient had been on stool softener/laxatives and subsequently developed diarrhea  · Abdominal cramps resolved  · Stool C diff negative  Start on Imodium p r n  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 [facility])  Assessment & Plan  Lab Results   Component Value Date    HGBA1C 5 1 09/09/2019       Recent Labs     12/02/19  1623 12/02/19  2116 12/03/19  0801 12/03/19  1142   POCGLU 119 104 126 132       Blood Sugar Average: Last 72 hrs:  · (P) 120 9  · Continue Lantus 5 units q h s  With correctional scale insulin  · Blood glucose levels are stable and acceptable    Acute renal failure superimposed on stage 4 chronic kidney disease (HCC)  Assessment & Plan  · Baseline creatinine is 2 4-2 8  Presented with creatinine elevated at 3 15  · Creatinine initially improved to baseline at 2 64 however has become elevated again to 3 39 today in the setting of diarrhea  · Received gentle hydration yesterday but no significant improvement in renal function this morning  · Nephrology following  Patient recommended to be observed 1 more day to ensure renal stability    Renal transplant recipient  Assessment & Plan  · Status post Renal and pancreatic transplant 1998  · Continue tacrolimus and prednisone    Tacrolimus level is 3 3-which is what her transplant team wants the level to be  · Continue to monitor renal function closely    Multiple myeloma not having achieved remission ([facility] [address] )  Assessment & Plan  · On Revlimid  · Outpatient Oncology follow-up    Ambulatory dysfunction  Assessment & Plan  · Recommended for home with home PT    Essential hypertension  Assessment & Plan  · Blood pressure stable and improved  · Continue current medications    Anemia  Assessment & Plan  · Anemia of chronic renal disease with baseline hemoglobin around 7 3  · She remains hemodynamically stable  · Continue ferrous sulfate, folic acid      VTE Pharmacologic Prophylaxis:   Pharmacologic: Heparin  Mechanical VTE Prophylaxis in Place: Yes    Patient Centered Rounds: I have performed bedside rounds with nursing staff today  Discussions with Specialists or Other Care Team Provider:  Nursing/nephrology    Education and Discussions with Family / Patient:  Patient    Current Length of Stay: 6 day(s)    Current Patient Status: Inpatient   Certification Statement: The patient will continue to require additional inpatient hospital stay due to Pending improved renal function    Discharge Plan:  Anticipate next 24 hours is cleared from the nephro standpoint    Code Status: Level 1 - Full Code      Subjective:   No new complaints or acute overnight events  Still with multiple loose bowel movements  Denies abdominal pain  She is eager for discharge home today  Objective:     Vitals:   Temp (24hrs), Av °F (36 7 °C), Min:97 8 °F (36 6 °C), Max:98 2 °F (36 8 °C)    Temp:  [97 8 °F (36 6 °C)-98 2 °F (36 8 °C)] 98 °F (36 7 °C)  HR:  [55-69] 69  Resp:  [18-20] 18  BP: (141-162)/(52-86) 147/65  SpO2:  [92 %-96 %] 96 %  Body mass index is 36 3 kg/m²  Input and Output Summary (last 24 hours):        Intake/Output Summary (Last 24 hours) at 2019 1547  Last data filed at 2019 1534  Gross per 24 hour   Intake 1822 5 ml   Output 1200 ml   Net 622 5 ml       Physical Exam:  General Appearance: Alert, cooperative, no distress, appropriately responsive    Head:    Normocephalic, without obvious abnormality, atraumatic, mucous membranes moist    Eyes:    Conjunctiva/corneas clear, EOM's intact   Neck:   Supple   Lungs:     Clear to auscultation bilaterally, respirations unlabored, no crackles or wheeze     Heart:    Regular rate and rhythm, S1 and S2    Abdomen:     Soft, obese, non-tender, bowel sounds active all four quadrants,     no masses, no organomegaly   Extremities:   Extremities normal, atraumatic, no cyanosis or edema   Neurologic:  nonfocal         Additional Data:     Labs:    Results from last 7 days   Lab Units 12/03/19  0611   WBC Thousand/uL 5 96   HEMOGLOBIN g/dL 7 2*   HEMATOCRIT % 24 0*   PLATELETS Thousands/uL 136*   NEUTROS PCT % 62   LYMPHS PCT % 18   MONOS PCT % 16*   EOS PCT % 1     Results from last 7 days   Lab Units 12/04/19  0530 12/03/19  0606   POTASSIUM mmol/L 3 8 3 5   CHLORIDE mmol/L 108 109*   CO2 mmol/L 17* 17*   BUN mg/dL 56* 54*   CREATININE mg/dL 3 39* 3 33*   CALCIUM mg/dL 8 4 8 5   ALK PHOS U/L  --  79   ALT U/L  --  11*   AST U/L  --  10           * I Have Reviewed All Lab Data Listed Above  * Additional Pertinent Lab Tests Reviewed: [address] Admission Reviewed    Cultures:   Blood Culture:   Lab Results   Component Value Date    BLOODCX No Growth After 5 Days  10/01/2019    BLOODCX No Growth After 5 Days  10/01/2019    BLOODCX No Growth After 5 Days  11/16/2017    BLOODCX No Growth After 5 Days  09/13/2017    BLOODCX No Growth After 5 Days  09/13/2017    BLOODCX No Growth After 5 Days  06/14/2017    BLOODCX No Growth After 5 Days   06/14/2017     Urine Culture:   Lab Results   Component Value Date    URINECX (A) 11/24/2019     >100,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/ml Vancomycin Resistant Enterococcus faecium    URINECX No Growth <1000 cfu/mL 11/05/2019    URINECX <10,000 cfu/ml  11/04/2019    URINECX >100,000 cfu/ml Escherichia coli (A) 10/23/2019    URINECX 10,000-19,000 cfu/ml  10/23/2019    URINECX No Growth <1000 cfu/mL 10/08/2019    URINECX >100,000 cfu/ml Escherichia coli (A) 10/01/2019     Sputum Culture: No components found for: SPUTUMCX  Wound Culture: No results found for: WOUNDCULT    Last 24 Hours Medication List:     Current Facility-Administered Medications:  acetaminophen 650 mg Oral Q6H PRN Javy Parsons PA-C    amLODIPine 10 mg Oral QAM George Merrill MD    ARIPiprazole 20 mg Oral HS Armando Branham MD    aspirin 81 mg Oral Daily Armando Branham MD    busPIRone 5 mg Oral BID Armando Branham MD    cholecalciferol 1,000 Units Oral TID Armando Branham MD    cloNIDine 0 3 mg Oral [facility] Armando Branham MD    doxazosin 4 mg Oral HS Belia Marquez PA-C    DULoxetine 60 mg Oral BID Armando Branham MD    ferrous sulfate 325 mg Oral Daily With Breakfast Armando Branham MD    folic acid 0,312 mcg Oral Daily Armando Branham MD    heparin (porcine) 5,000 Units Subcutaneous [facility] Armando Branham MD    hydrALAZINE 5 mg Intravenous Q6H PRN Gardenia Sever, MD    hydrALAZINE 100 mg Oral TID monique Marquez PA-C    insulin glargine 5 Units Subcutaneous HS Armando Branham MD    insulin lispro 1-6 Units Subcutaneous TID AC Javy Parsons PA-C    insulin lispro 1-6 Units Subcutaneous HS Javy Parsons PA-C    levothyroxine 125 mcg Oral Early Morning Armando Branham MD    lidocaine 1 patch Topical Daily Elaine Hsieh MD    loperamide 2 mg Oral 4x Daily PRN Tania Anna MD    LORazepam 2 mg Oral TID PRN Armando Branham MD    metoprolol tartrate 50 mg Oral Q12H [address] [facility] [address] [place] [address]    multi-electrolyte 75 mL/hr Intravenous Continuous MER Sykes Last Rate: 75 mL/hr (12/04/19 0411)   ondansetron 4 mg Intravenous Q8H PRN Leeanne Morales PA-C    pravastatin 80 mg Oral Daily Armando Branham MD    predniSONE 5 mg Oral Daily Armando Branham MD    promethazine 25 mg Intramuscular Q6H PRN Javy Baptiste PA-C    saccharomyces boulardii 250 mg Oral BID Vic Decker MD    sertraline 200 mg Oral Daily Korin Varela MD    sevelamer 800 mg Oral TID With Meals Korin Varela MD    sodium bicarbonate 1,300 mg Oral TID Korin Varela MD    tacrolimus 2 mg Oral HS Michelle Mae MD    tacrolimus 3 mg Oral Daily Michelle Mae MD         Today, Patient Was Seen By: Rafael Wick MD    ** Please Note: Dragon 360 Dictation voice to text software may have been used in the creation of this document   **

## 2019-12-04 NOTE — NURSING NOTE
Patient is c  Diff (-) and wondering if she can be discharged home today  Pt needs to be seen by nephrology prior to d/c

## 2019-12-05 LAB
ABO GROUP BLD: NORMAL
ANION GAP SERPL CALCULATED.3IONS-SCNC: 15 MMOL/L (ref 4–13)
ANTIBODY ID. #3: NORMAL
BLD GP AB SCN SERPL QL: POSITIVE
BLOOD GROUP ANTIBODIES SERPL: NORMAL
BLOOD GROUP ANTIBODIES SERPL: NORMAL
BUN SERPL-MCNC: 57 MG/DL (ref 5–25)
CALCIUM SERPL-MCNC: 8.5 MG/DL (ref 8.3–10.1)
CHLORIDE SERPL-SCNC: 106 MMOL/L (ref 100–108)
CO2 SERPL-SCNC: 17 MMOL/L (ref 21–32)
CREAT SERPL-MCNC: 3.04 MG/DL (ref 0.6–1.3)
ERYTHROCYTE [DISTWIDTH] IN BLOOD BY AUTOMATED COUNT: 16.8 % (ref 11.6–15.1)
GFR SERPL CREATININE-BSD FRML MDRD: 17 ML/MIN/1.73SQ M
GLUCOSE SERPL-MCNC: 101 MG/DL (ref 65–140)
GLUCOSE SERPL-MCNC: 113 MG/DL (ref 65–140)
GLUCOSE SERPL-MCNC: 119 MG/DL (ref 65–140)
GLUCOSE SERPL-MCNC: 123 MG/DL (ref 65–140)
GLUCOSE SERPL-MCNC: 135 MG/DL (ref 65–140)
HCT VFR BLD AUTO: 22.3 % (ref 34.8–46.1)
HGB BLD-MCNC: 6.9 G/DL (ref 11.5–15.4)
MCH RBC QN AUTO: 31.8 PG (ref 26.8–34.3)
MCHC RBC AUTO-ENTMCNC: 30.9 G/DL (ref 31.4–37.4)
MCV RBC AUTO: 103 FL (ref 82–98)
PLATELET # BLD AUTO: 136 THOUSANDS/UL (ref 149–390)
PMV BLD AUTO: 12.8 FL (ref 8.9–12.7)
POTASSIUM SERPL-SCNC: 3.4 MMOL/L (ref 3.5–5.3)
RBC # BLD AUTO: 2.17 MILLION/UL (ref 3.81–5.12)
RH BLD: POSITIVE
SODIUM SERPL-SCNC: 138 MMOL/L (ref 136–145)
SPECIMEN EXPIRATION DATE: NORMAL
WBC # BLD AUTO: 5.08 THOUSAND/UL (ref 4.31–10.16)

## 2019-12-05 PROCEDURE — 85027 COMPLETE CBC AUTOMATED: CPT | Performed by: NURSE PRACTITIONER

## 2019-12-05 PROCEDURE — 86921 COMPATIBILITY TEST INCUBATE: CPT

## 2019-12-05 PROCEDURE — 80048 BASIC METABOLIC PNL TOTAL CA: CPT | Performed by: NURSE PRACTITIONER

## 2019-12-05 PROCEDURE — 86900 BLOOD TYPING SEROLOGIC ABO: CPT | Performed by: INTERNAL MEDICINE

## 2019-12-05 PROCEDURE — 86870 RBC ANTIBODY IDENTIFICATION: CPT | Performed by: INTERNAL MEDICINE

## 2019-12-05 PROCEDURE — 86901 BLOOD TYPING SEROLOGIC RH(D): CPT | Performed by: INTERNAL MEDICINE

## 2019-12-05 PROCEDURE — 99232 SBSQ HOSP IP/OBS MODERATE 35: CPT | Performed by: INTERNAL MEDICINE

## 2019-12-05 PROCEDURE — 86850 RBC ANTIBODY SCREEN: CPT | Performed by: INTERNAL MEDICINE

## 2019-12-05 PROCEDURE — 82948 REAGENT STRIP/BLOOD GLUCOSE: CPT

## 2019-12-05 PROCEDURE — 86902 BLOOD TYPE ANTIGEN DONOR EA: CPT

## 2019-12-05 PROCEDURE — P9040 RBC LEUKOREDUCED IRRADIATED: HCPCS

## 2019-12-05 PROCEDURE — 86922 COMPATIBILITY TEST ANTIGLOB: CPT

## 2019-12-05 RX ORDER — HYDRALAZINE HYDROCHLORIDE 100 MG/1
100 TABLET, FILM COATED ORAL 3 TIMES DAILY
Qty: 90 TABLET | Refills: 0 | Status: SHIPPED | OUTPATIENT
Start: 2019-12-05 | End: 2020-01-17 | Stop reason: SDUPTHER

## 2019-12-05 RX ORDER — POTASSIUM CHLORIDE 20 MEQ/1
20 TABLET, EXTENDED RELEASE ORAL ONCE
Status: COMPLETED | OUTPATIENT
Start: 2019-12-05 | End: 2019-12-05

## 2019-12-05 RX ORDER — LOPERAMIDE HYDROCHLORIDE 2 MG/1
2 CAPSULE ORAL 4 TIMES DAILY PRN
Refills: 0
Start: 2019-12-05 | End: 2020-01-23 | Stop reason: HOSPADM

## 2019-12-05 RX ORDER — SACCHAROMYCES BOULARDII 250 MG
250 CAPSULE ORAL 2 TIMES DAILY
Qty: 20 EACH | Refills: 0 | Status: SHIPPED | OUTPATIENT
Start: 2019-12-05 | End: 2020-01-08

## 2019-12-05 RX ADMIN — HYDRALAZINE HYDROCHLORIDE 100 MG: 25 TABLET ORAL at 20:24

## 2019-12-05 RX ADMIN — FERROUS SULFATE TAB 325 MG (65 MG ELEMENTAL FE) 325 MG: 325 (65 FE) TAB at 08:30

## 2019-12-05 RX ADMIN — PREDNISONE 5 MG: 5 TABLET ORAL at 08:29

## 2019-12-05 RX ADMIN — SODIUM CHLORIDE, SODIUM GLUCONATE, SODIUM ACETATE, POTASSIUM CHLORIDE AND MAGNESIUM CHLORIDE 75 ML/HR: 526; 502; 368; 37; 30 INJECTION, SOLUTION INTRAVENOUS at 10:07

## 2019-12-05 RX ADMIN — METOPROLOL TARTRATE 50 MG: 50 TABLET, FILM COATED ORAL at 20:24

## 2019-12-05 RX ADMIN — BUSPIRONE HYDROCHLORIDE 5 MG: 5 TABLET ORAL at 17:31

## 2019-12-05 RX ADMIN — LORAZEPAM 2 MG: 1 TABLET ORAL at 14:34

## 2019-12-05 RX ADMIN — CHOLECALCIFEROL TAB 25 MCG (1000 UNIT) 1000 UNITS: 25 TAB at 17:31

## 2019-12-05 RX ADMIN — TACROLIMUS 3 MG: 1 CAPSULE ORAL at 08:29

## 2019-12-05 RX ADMIN — POTASSIUM CHLORIDE 20 MEQ: 1500 TABLET, EXTENDED RELEASE ORAL at 14:35

## 2019-12-05 RX ADMIN — HEPARIN SODIUM 5000 UNITS: 5000 INJECTION INTRAVENOUS; SUBCUTANEOUS at 14:35

## 2019-12-05 RX ADMIN — CLONIDINE HYDROCHLORIDE 0.3 MG: 0.1 TABLET ORAL at 05:06

## 2019-12-05 RX ADMIN — HEPARIN SODIUM 5000 UNITS: 5000 INJECTION INTRAVENOUS; SUBCUTANEOUS at 21:33

## 2019-12-05 RX ADMIN — LEVOTHYROXINE SODIUM 125 MCG: 125 TABLET ORAL at 05:06

## 2019-12-05 RX ADMIN — METOPROLOL TARTRATE 50 MG: 50 TABLET, FILM COATED ORAL at 08:30

## 2019-12-05 RX ADMIN — CHOLECALCIFEROL TAB 25 MCG (1000 UNIT) 1000 UNITS: 25 TAB at 08:29

## 2019-12-05 RX ADMIN — Medication 250 MG: at 08:29

## 2019-12-05 RX ADMIN — CHOLECALCIFEROL TAB 25 MCG (1000 UNIT) 1000 UNITS: 25 TAB at 20:23

## 2019-12-05 RX ADMIN — ARIPIPRAZOLE 20 MG: 5 TABLET ORAL at 21:33

## 2019-12-05 RX ADMIN — HEPARIN SODIUM 5000 UNITS: 5000 INJECTION INTRAVENOUS; SUBCUTANEOUS at 05:07

## 2019-12-05 RX ADMIN — BUSPIRONE HYDROCHLORIDE 5 MG: 5 TABLET ORAL at 08:29

## 2019-12-05 RX ADMIN — SEVELAMER HYDROCHLORIDE 800 MG: 800 TABLET, FILM COATED PARENTERAL at 08:30

## 2019-12-05 RX ADMIN — DULOXETINE HYDROCHLORIDE 60 MG: 60 CAPSULE, DELAYED RELEASE ORAL at 08:29

## 2019-12-05 RX ADMIN — SEVELAMER HYDROCHLORIDE 800 MG: 800 TABLET, FILM COATED PARENTERAL at 16:19

## 2019-12-05 RX ADMIN — SODIUM BICARBONATE 650 MG TABLET 1300 MG: at 08:29

## 2019-12-05 RX ADMIN — Medication 250 MG: at 17:31

## 2019-12-05 RX ADMIN — HYDRALAZINE HYDROCHLORIDE 5 MG: 20 INJECTION INTRAMUSCULAR; INTRAVENOUS at 17:26

## 2019-12-05 RX ADMIN — SEVELAMER HYDROCHLORIDE 800 MG: 800 TABLET, FILM COATED PARENTERAL at 12:36

## 2019-12-05 RX ADMIN — TACROLIMUS 2 MG: 1 CAPSULE ORAL at 20:23

## 2019-12-05 RX ADMIN — INSULIN GLARGINE 5 UNITS: 100 INJECTION, SOLUTION SUBCUTANEOUS at 21:33

## 2019-12-05 RX ADMIN — ASPIRIN 81 MG: 81 TABLET, COATED ORAL at 08:29

## 2019-12-05 RX ADMIN — LOPERAMIDE HYDROCHLORIDE 2 MG: 2 CAPSULE ORAL at 05:06

## 2019-12-05 RX ADMIN — SODIUM BICARBONATE 650 MG TABLET 1300 MG: at 20:24

## 2019-12-05 RX ADMIN — PRAVASTATIN SODIUM 80 MG: 80 TABLET ORAL at 08:30

## 2019-12-05 RX ADMIN — HYDRALAZINE HYDROCHLORIDE 100 MG: 25 TABLET ORAL at 16:18

## 2019-12-05 RX ADMIN — CLONIDINE HYDROCHLORIDE 0.3 MG: 0.1 TABLET ORAL at 21:33

## 2019-12-05 RX ADMIN — AMLODIPINE BESYLATE 10 MG: 10 TABLET ORAL at 08:30

## 2019-12-05 RX ADMIN — DULOXETINE HYDROCHLORIDE 60 MG: 60 CAPSULE, DELAYED RELEASE ORAL at 17:31

## 2019-12-05 RX ADMIN — SERTRALINE HYDROCHLORIDE 200 MG: 100 TABLET ORAL at 08:29

## 2019-12-05 RX ADMIN — LORAZEPAM 2 MG: 1 TABLET ORAL at 08:40

## 2019-12-05 RX ADMIN — HYDRALAZINE HYDROCHLORIDE 100 MG: 25 TABLET ORAL at 08:29

## 2019-12-05 RX ADMIN — DOXAZOSIN 4 MG: 4 TABLET ORAL at 21:33

## 2019-12-05 RX ADMIN — FOLIC ACID 1000 MCG: 1 TABLET ORAL at 08:30

## 2019-12-05 RX ADMIN — CLONIDINE HYDROCHLORIDE 0.3 MG: 0.1 TABLET ORAL at 14:35

## 2019-12-05 RX ADMIN — SODIUM BICARBONATE 650 MG TABLET 1300 MG: at 16:18

## 2019-12-05 NOTE — PROGRESS NOTES
Progress Note - Bigg Cummings 1965, 53 y o  female MRN: [id number]    Unit/Bed#: S -01 Encounter: [id number]    Primary Care Provider: Michelle Keating MD   Date and time admitted to hospital: 11/23/2019  2:47 PM    * Acute cystitis without hematuria secondary to VRE infection  Assessment & Plan  · Admitted with dysuria, generalized weakness/fatigue  Urine cultures positive for VRE (Enterococcus fecium)  · Overall clinically improved and is status post completion of 7 days course IV daptomycin  · Continue to monitor off antibiotics at this time  · She remains afebrile and without leukocytosis    Diarrhea  Assessment & Plan  · Patient had been on stool softener/laxatives and subsequently developed diarrhea  · Abdominal cramps resolved  · Stool C diff negative  Continue Imodium p r n  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 [facility])  Assessment & Plan  Lab Results   Component Value Date    HGBA1C 5 1 09/09/2019       Recent Labs     12/04/19  1539 12/04/19  2114 12/05/19  0731 12/05/19  1124   POCGLU 125 102 113 123       Blood Sugar Average: Last 72 hrs:  · (P) 120 9  · Continue Lantus 5 units q h s  With correctional scale insulin  · Blood glucose levels are stable and acceptable    Acute renal failure superimposed on stage 4 chronic kidney disease (HCC)  Assessment & Plan  · Baseline creatinine is 2 4-2 8  Presented with creatinine elevated at 3 15  · Creatinine improved down to 3 04 today after gentle IV hydration  · IV fluids discontinued today  · Discussed with Nephrology  Appreciate input    Renal transplant recipient  Assessment & Plan  · Status post Renal and pancreatic transplant 1998  · Continue tacrolimus and prednisone    Tacrolimus level is 3 3-which is what her transplant team wants the level to be  · Continue to monitor renal function closely    Multiple myeloma not having achieved remission [facility])  Assessment & Plan  · On Revlimid  · Outpatient Oncology follow-up    Ambulatory dysfunction  Assessment & Plan  · Recommended for home with home PT    Essential hypertension  Assessment & Plan  · Blood pressure stable and improved  · Continue current medications    Anemia  Assessment & Plan  · Anemia of chronic renal disease with baseline hemoglobin around 7 3  · She remains hemodynamically stable  · Hemoglobin dropped to 6 9 today  Suspect some dilutional component while on IV fluids  She will receive 1 unit PRBC transfusion prior to discharge today  · Continue ferrous sulfate, folic acid      VTE Pharmacologic Prophylaxis:   Pharmacologic: Heparin  Mechanical VTE Prophylaxis in Place: Yes    Patient Centered Rounds: I have performed bedside rounds with nursing staff today  Discussions with Specialists or Other Care Team Provider:  Nursing/nephrology    Education and Discussions with Family / Patient:  Patient    Current Length of Stay: 12 day(s)    Current Patient Status: Inpatient   Certification Statement: The patient will continue to require additional inpatient hospital stay due to Above diagnosis and care plan    Discharge Plan:  Anticipate discharge home with VNA later today after PRBC transfusion    Code Status: Level 1 - Full Code      Subjective:   No new complaints or acute overnight events  She reports continued loose stools  Not as bad as prior  She denies any fever or chills  Objective:     Vitals:   Temp (24hrs), Av 8 °F (36 6 °C), Min:97 5 °F (36 4 °C), Max:98 °F (36 7 °C)    Temp:  [97 5 °F (36 4 °C)-98 °F (36 7 °C)] 97 5 °F (36 4 °C)  HR:  [56-69] 60  Resp:  [18] 18  BP: (147-173)/(65-74) 164/71  SpO2:  [93 %-96 %] 94 %  Body mass index is 36 67 kg/m²  Input and Output Summary (last 24 hours):        Intake/Output Summary (Last 24 hours) at 2019 1422  Last data filed at 2019 1007  Gross per 24 hour   Intake 2237 5 ml   Output 1700 ml   Net 537 5 ml       Physical Exam:  General Appearance:    Alert, cooperative, no distress, appropriately responsive    Head:    Normocephalic, without obvious abnormality, atraumatic, mucous membranes moist    Eyes:    Conjunctiva/corneas clear, EOM's intact   Neck:   Supple   Lungs:     Clear to auscultation bilaterally, respirations unlabored, no crackles or wheeze     Heart:    Regular rate and rhythm, S1 and S2    Abdomen:     Soft, obese, non-tender, bowel sounds active all four quadrants,     no masses, no organomegaly   Extremities:   Extremities normal, atraumatic, no cyanosis or edema   Neurologic:  nonfocal         Additional Data:     Labs:    Results from last 7 days   Lab Units 12/05/19  0546 12/03/19  0611   WBC Thousand/uL 5 08 5 96   HEMOGLOBIN g/dL 6 9* 7 2*   HEMATOCRIT % 22 3* 24 0*   PLATELETS Thousands/uL 136* 136*   NEUTROS PCT %  --  62   LYMPHS PCT %  --  18   MONOS PCT %  --  16*   EOS PCT %  --  1     Results from last 7 days   Lab Units 12/05/19  0546  12/03/19  0606   POTASSIUM mmol/L 3 4*   < > 3 5   CHLORIDE mmol/L 106   < > 109*   CO2 mmol/L 17*   < > 17*   BUN mg/dL 57*   < > 54*   CREATININE mg/dL 3 04*   < > 3 33*   CALCIUM mg/dL 8 5   < > 8 5   ALK PHOS U/L  --   --  79   ALT U/L  --   --  11*   AST U/L  --   --  10    < > = values in this interval not displayed  * I Have Reviewed All Lab Data Listed Above  * Additional Pertinent Lab Tests Reviewed: [facility] Admission Reviewed    Cultures:   Blood Culture:   Lab Results   Component Value Date    BLOODCX No Growth After 5 Days  10/01/2019    BLOODCX No Growth After 5 Days  10/01/2019    BLOODCX No Growth After 5 Days  11/16/2017    BLOODCX No Growth After 5 Days  09/13/2017    BLOODCX No Growth After 5 Days  09/13/2017    BLOODCX No Growth After 5 Days  06/14/2017    BLOODCX No Growth After 5 Days   06/14/2017     Urine Culture:   Lab Results   Component Value Date    URINECX (A) 11/24/2019     >100,000 cfu/ml Vancomycin Resistant Enterococcus faecium    URINECX No Growth <1000 cfu/mL 11/05/2019    URINECX <10,000 cfu/ml  11/04/2019    URINECX >100,000 cfu/ml Escherichia coli (A) 10/23/2019    URINECX 10,000-19,000 cfu/ml  10/23/2019    URINECX No Growth <1000 cfu/mL 10/08/2019    URINECX >100,000 cfu/ml Escherichia coli (A) 10/01/2019     Sputum Culture: No components found for: SPUTUMCX  Wound Culture: No results found for: WOUNDCULT    Last 24 Hours Medication List:     Current Facility-Administered Medications:  acetaminophen 650 mg Oral Q6H PRN Javy Parsons PA-C   amLODIPine 10 mg Oral QAM Cachorro Donald MD   ARIPiprazole 20 mg Oral HS Cristi Elam MD   aspirin 81 mg Oral Daily Cristi Elam MD   busPIRone 5 mg Oral BID Cristi Elam MD   cholecalciferol 1,000 Units Oral TID Cristi Elam MD   cloNIDine 0 3 mg Oral [facility] Cristi Elam MD   doxazosin 4 mg Oral HS [facility], PAFaraC   DULoxetine 60 mg Oral BID Cristi Elam MD   ferrous sulfate 325 mg Oral Daily With Breakfast Cristi Elam MD   folic acid 0,393 mcg Oral Daily Cristi Elam MD   heparin (porcine) 5,000 Units Subcutaneous [facility] Cristi Elam MD   hydrALAZINE 5 mg Intravenous Q6H PRN Trenton Rodríguez MD   hydrALAZINE 100 mg Oral TID [facility], PA-C   insulin glargine 5 Units Subcutaneous HS Cristi Elam MD   insulin lispro 1-6 Units Subcutaneous TID AC Javy Parsons PA-C   insulin lispro 1-6 Units Subcutaneous HS Javy Parsons PA-C   levothyroxine 125 mcg Oral Early Morning Cristi Elam MD   lidocaine 1 patch Topical Daily Sandra Higgins MD   loperamide 2 mg Oral 4x Daily PRN Mavis Barfield MD   LORazepam 2 mg Oral TID PRN Cristi Elam MD   metoprolol tartrate 50 mg Oral Q12H [facility], PAFaraC   ondansetron 4 mg Intravenous Q8H PRN Roz Ortiz PA-C   potassium chloride 20 mEq Oral Once [facility]   pravastatin 80 mg Oral Daily Cristi Elam MD   predniSONE 5 mg Oral Daily Cristi Elam MD   promethazine 25 mg Intramuscular Q6H PRN Javy Parsons PA-C   saccharomyces boulardii 250 mg Oral BID Sarah Batres MD   sertraline 200 mg Oral Daily Inna Cooper MD   sevelamer 800 mg Oral TID With Meals Inna Cooper MD   sodium bicarbonate 1,300 mg Oral TID Inna Cooper MD   tacrolimus 2 mg Oral HS Bharath Man MD   tacrolimus 3 mg Oral Daily Bharath Man MD        Today, Patient Was Seen By: Octavio Delarosa MD    ** Please Note: Dragon 360 Dictation voice to text software may have been used in the creation of this document   **

## 2019-12-05 NOTE — ASSESSMENT & PLAN NOTE
· Baseline creatinine is 2 4-2 8  Presented with creatinine elevated at 3 15  · Creatinine improved down to 3 04 today after gentle IV hydration  · IV fluids discontinued today  · Discussed with Nephrology    Appreciate input

## 2019-12-05 NOTE — ASSESSMENT & PLAN NOTE
· Anemia of chronic renal disease with baseline hemoglobin around 7 3  · She remains hemodynamically stable  · Hemoglobin dropped to 6 9 today  Suspect some dilutional component while on IV fluids    She will receive 1 unit PRBC transfusion prior to discharge today  · Continue ferrous sulfate, folic acid

## 2019-12-05 NOTE — PROGRESS NOTES
Progress Note - Infectious Disease   Chi Morales 53 y o  female MRN: [id number]  Unit/Bed#: S -01 Encounter: [id number]      Impression/Recommendations:  1  VRE UTI   Patient is clinically well on IV daptomycin   No evidence of pyelonephritis clinically   Patient has remained systemically well, without sepsis or systemic toxicity   She completed antibiotic course  Observe off antibiotic  Monitor temperature/WBC  Monitor urinary symptoms      2  Diarrhea, most likely secondary to stool softener/laxatives   Diarrhea  resolving spontaneously  Stool C diff toxin negative  Monitor diarrhea      3  EDUARDO, superimposed on CKD   Creatinine slowly improved  Monitor      4  Status post kidney and pancreas transplant   Patient is on stable anti rejection regimen      5  Multiple myeloma, on Revlimid      Discussed with patient in detail regarding the above plan      Antibiotics:  Off antibiotic       Subjective:  Patient has intermittent loose stool but no maya diarrhea  No abdominal pain or nausea/vomiting   No flank pain  No urinary symptoms  Temperature stays down   No chills  Objective:  Vitals:  Temp:  [97 5 °F (36 4 °C)-98 °F (36 7 °C)] 97 5 °F (36 4 °C)  HR:  [56-69] 60  Resp:  [18] 18  BP: (147-173)/(65-74) 164/71  SpO2:  [93 %-96 %] 94 %  Temp (24hrs), Av 8 °F (36 6 °C), Min:97 5 °F (36 4 °C), Max:98 °F (36 7 °C)  Current: Temperature: 97 5 °F (36 4 °C)    Physical Exam:     General: Awake, alert, cooperative, no distress  Neck:  Supple  No mass  No lymphadenopathy  Lungs: Expansion symmetric, no rales, no wheezing, respirations unlabored  Heart:  Regular rate and rhythm, S1 and S2 normal, no murmur  Abdomen: Soft, nondistended, non-tender, bowel sounds active all four quadrants,        no masses, no organomegaly  Extremities: Stable mild leg edema  No erythema/warmth  No ulcer  Nontender to palpation  Skin:  No rash  Neuro: Moves all extremities       Invasive Devices Peripheral Intravenous Line            Peripheral IV 12/04/19 Left;Ventral (anterior) Forearm 1 day                Labs studies:   I have personally reviewed pertinent labs  Results from last 7 days   Lab Units 12/05/19  0546 12/04/19  0530 12/03/19  0606 12/02/19  0049   POTASSIUM mmol/L 3 4* 3 8 3 5 3 7   CHLORIDE mmol/L 106 108 109* 108   CO2 mmol/L 17* 17* 17* 19*   BUN mg/dL 57* 56* 54* 47*   CREATININE mg/dL 3 04* 3 39* 3 33* 2 64*   EGFR ml/min/1 73sq m 17 15 15 20   CALCIUM mg/dL 8 5 8 4 8 5 9 0   AST U/L  --   --  10 10   ALT U/L  --   --  11* 12   ALK PHOS U/L  --   --  79 87     Results from last 7 days   Lab Units 12/05/19  0546 12/03/19  0611 12/02/19  0049   WBC Thousand/uL 5 08 5 96 8 02   HEMOGLOBIN g/dL 6 9* 7 2* 8 5*   PLATELETS Thousands/uL 136* 136* 141*     Results from last 7 days   Lab Units 12/03/19  1149   C DIFF TOXIN B  NEGATIVE for C difficle toxin by PCR  Imaging Studies:   I have personally reviewed pertinent imaging study reports and images in PACS  EKG, Pathology, and Other Studies:   I have personally reviewed pertinent reports

## 2019-12-05 NOTE — ASSESSMENT & PLAN NOTE
· Patient had been on stool softener/laxatives and subsequently developed diarrhea  · Abdominal cramps resolved  · Stool C diff negative  Continue Imodium p r n

## 2019-12-05 NOTE — PLAN OF CARE
Problem: Potential for Falls  Goal: Patient will remain free of falls  Description  INTERVENTIONS:  - Assess patient frequently for physical needs  -  Identify cognitive and physical deficits and behaviors that affect risk of falls    -  [place] fall precautions as indicated by assessment   - Educate patient/family on patient safety including physical limitations  - Instruct patient to call for assistance with activity based on assessment  - Modify environment to reduce risk of injury  - Consider OT/PT consult to assist with strengthening/mobility  Outcome: Progressing     Problem: PAIN - ADULT  Goal: Verbalizes/displays adequate comfort level or baseline comfort level  Description  Interventions:  - Encourage patient to monitor pain and request assistance  - Assess pain using appropriate pain scale  - Administer analgesics based on type and severity of pain and evaluate response  - Implement non-pharmacological measures as appropriate and evaluate response  - Consider cultural and social influences on pain and pain management  - Notify physician/advanced practitioner if interventions unsuccessful or patient reports new pain  Outcome: Progressing     Problem: INFECTION - ADULT  Goal: Absence or prevention of progression during hospitalization  Description  INTERVENTIONS:  - Assess and monitor for signs and symptoms of infection  - Monitor lab/diagnostic results  - Monitor all insertion sites, i e  indwelling lines, tubes, and drains  - Monitor endotracheal if appropriate and nasal secretions for changes in amount and color  - [place] appropriate cooling/warming therapies per order  - Administer medications as ordered  - Instruct and encourage patient and family to use good hand hygiene technique  - Identify and instruct in appropriate isolation precautions for identified infection/condition  Outcome: Progressing     Problem: GENITOURINARY - ADULT  Goal: Maintains or returns to baseline urinary function  Description  INTERVENTIONS:  - Assess urinary function  - Encourage oral fluids to ensure adequate hydration if ordered  - Administer IV fluids as ordered to ensure adequate hydration  - Administer ordered medications as needed  - Offer frequent toileting  - Follow urinary retention protocol if ordered  Outcome: Progressing  Goal: Absence of urinary retention  Description  INTERVENTIONS:  - Assess patient's ability to void and empty bladder  - Monitor I/O  - Bladder scan as needed  - Discuss with physician/AP medications to alleviate retention as needed  - Discuss catheterization for long term situations as appropriate   Outcome: Progressing     Problem: Nutrition/Hydration-ADULT  Goal: Nutrient/Hydration intake appropriate for improving, restoring or maintaining nutritional needs  Description  Monitor and assess patient's nutrition/hydration status for malnutrition  Collaborate with interdisciplinary team and initiate plan and interventions as ordered  Monitor patient's weight and dietary intake as ordered or per policy  Utilize nutrition screening tool and intervene as necessary  Determine patient's food preferences and provide high-protein, high-caloric foods as appropriate       INTERVENTIONS:  - Monitor oral intake, urinary output, labs, and treatment plans  - Assess nutrition and hydration status and recommend course of action  - Evaluate amount of meals eaten  - Assist patient with eating if necessary   - Allow adequate time for meals  - Recommend/ encourage appropriate diets, oral nutritional supplements, and vitamin/mineral supplements  - Order, calculate, and assess calorie counts as needed  - Recommend, monitor, and adjust tube feedings and TPN/PPN based on assessed needs  - Assess need for intravenous fluids  - Provide specific nutrition/hydration education as appropriate  - Include patient/family/caregiver in decisions related to nutrition  Outcome: Progressing

## 2019-12-05 NOTE — PROGRESS NOTES
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 Secondary rise in creatinine:  Creatinine 3 33 on 12/3 in the setting of diarrhea/ATN  Anemia may also have contributed  No hydronephrosis on CT  Patient given IV fluids  Plan for transfusion today  ? Since 02/03/2019: 3 33--> 3 39-->3 0  · Workup:    ? Urinalysis 30-50 WBCs, innumerable bacteria   1-2 RBCs, trace protein, large leukocytes  ? Imaging:  CT of the abdomen and pelvis  No transplant hydronephrosis noted   Bladder distention noted with bladder diverticulum likely related to previous surgery   Slight stranding noted left lower quadrant renal transplant    ? CT of the abdomen repeated 12/2/19   No hydronephrosis noted   Native right kidney lower pole lesion increased in size when compared to imaging 2 years ago   · Plan:  ? Discontinue IV fluids  ? Transfusion planned for today  ? Follow-up ultrasound right native kidney- non emergent to monitor right lower pole lesion  2   Chronic kidney disease, stage IV:  Chronic allograft nephropathy  · Nephrologist:  Dr RONY Gusman  · Baseline creatinine:  2 5  · Urine protein creatinine ratio 2 1 g 11/05/2019  3  Renal/pancreatic transplant:  1998 at [address]  4  Immune suppression:    · On tacrolimus 3 mg in the morning and 2 mg in the evening  · Last tacrolimus level 3 3 on 11/26/2019  · Goal 3-5  · Also on prednisone  4  VRE UTI:  · ID following  · History of recurrent urinary tract infections  · Completed Daptomycin 12/2  5  Urinary retention:    · Follows with Urology     · [place] catheter removed 11/24/2019 with plan for self catheterization   6  Hypertension:    · History of left subclavian stenosis  · History of orthostasis  · Renal artery stenosis of transplanted kidney on imaging but angio at [place] was unrevealing    · On multidrug regime:  Amlodipine mg daily, clonidine 0 3 mg every 8 hours, Cardura 4 mg hs, hydralazine 100 mg t i d , metoprolol 50 mg every 12 hours  · Hydralazine increased to 100 mg every 8 hours on 11/27  · Blood pressure had improved but today is higher which may be related to IV fluid infusion  · Plan:  Discontinue IV fluids especially in light of planned transfusion  7  Electrolytes:  · Hypokalemia, mild:  Potassium 3 4, replete  8  Low bicarbonate:  · Multifactorial:  GI losses, sequela of previous pancreatic surgery  · Continue sodium bicarbonate tablets 1300 mg t i d   · Try to keep potassium level near 4  9  Anemia:    · On oral iron replacement, folic acid  · Hemoglobin declined from 8 5 to 7 2  · Check labs in the a m   10  CKD MBD:    · On sevelamer 800 mg t i d  With meals  · continue phosphorus 4 7 on 12/04/2019  · On vitamin D3 supplement 1000 units 3 times daily  11  Diarrhea:    · CT shows circumferential thickening of the sigmoid colon suspicious for colitis   Stool for C diff negative  · Diarrhea resolved  12  Failed pancreatic transplant  13  Multiple myeloma on Revlimid    DISPOSITION:  For 1 unit of packed cells  Discontinue IV fluid    SUBJECTIVE / INTERVAL HISTORY:  No diarrhea    No overnight events  Concerned about drop in hemoglobin  OBJECTIVE:  Current Weight: Weight - Scale: 109 kg (241 lb 2 9 oz)  Vitals:    12/04/19 2122 12/04/19 2235 12/05/19 0554 12/05/19 0726   BP: (!) 173/74 168/70  164/71   BP Location:  Right arm  Left arm   Pulse:  56  60   Resp:  18  18   Temp:  97 9 °F (36 6 °C)  97 5 °F (36 4 °C)   TempSrc:  Oral  Oral   SpO2:  93%  94%   Weight:   109 kg (241 lb 2 9 oz)    Height:           Intake/Output Summary (Last 24 hours) at 12/5/2019 1222  Last data filed at 12/5/2019 1007  Gross per 24 hour   Intake 2237 5 ml   Output 2300 ml   Net -62 5 ml     General: NAD  Comfortably lying in bed  Eating lunch  Skin: no rash  Eyes: anicteric sclera  ENT: moist mucous membrane  Neck: supple  Chest: CTA b/l, no ronchii, no wheeze, no rubs, no rales  Normal effort  CVS: s1s2, no murmur, no gallop, no rub    Regular rhythm  Abdomen: soft, nontender, nl sounds  Extremities:  Trace edema LE b/l  : no hinojosa  Neuro: AAOX3  Psych: normal affect  Medications:    Current Facility-Administered Medications:     acetaminophen (TYLENOL) tablet 650 mg, 650 mg, Oral, Q6H PRN, Javy Parsons PA-C, 650 mg at 12/02/19 0655    amLODIPine (NORVASC) tablet 10 mg, 10 mg, Oral, QAM, Brendan Spurling, MD, 10 mg at 12/05/19 0830    ARIPiprazole (ABILIFY) tablet 20 mg, 20 mg, Oral, HS, Nicki Kim MD, 20 mg at 12/04/19 2124    aspirin (ECOTRIN LOW STRENGTH) EC tablet 81 mg, 81 mg, Oral, Daily, Nicki Kim MD, 81 mg at 12/05/19 0829    busPIRone (BUSPAR) tablet 5 mg, 5 mg, Oral, BID, Nicki Kim MD, 5 mg at 12/05/19 0087    cholecalciferol (VITAMIN D3) tablet 1,000 Units, 1,000 Units, Oral, TID, Nicki Kim MD, 1,000 Units at 12/05/19 0829    cloNIDine (CATAPRES) tablet 0 3 mg, 0 3 mg, Oral, Q8H [facility], [facility], 0 3 mg at 12/05/19 0506    doxazosin (CARDURA) tablet 4 mg, 4 mg, Oral, HS, [facility], [facility], 4 mg at 12/04/19 2134    DULoxetine (CYMBALTA) delayed release capsule 60 mg, 60 mg, Oral, BID, Cheri Mei MD, 60 mg at 12/05/19 8579    ferrous sulfate tablet 325 mg, 325 mg, Oral, Daily With Breakfast, Cheri Mei MD, 325 mg at 03/30/73 6312    folic acid (FOLVITE) tablet 1,000 mcg, 1,000 mcg, Oral, Daily, Cheri Mei MD, 1,000 mcg at 12/05/19 0830    heparin (porcine) subcutaneous injection 5,000 Units, 5,000 Units, Subcutaneous, Q8H [facility], 5,000 Units at 12/05/19 0507 **AND** [COMPLETED] Platelet count, , , Once, Cheri Mei MD    hydrALAZINE (APRESOLINE) injection 5 mg, 5 mg, Intravenous, Q6H PRN, Malika Hill MD, 5 mg at 12/01/19 1006    hydrALAZINE (APRESOLINE) tablet 100 mg, 100 mg, Oral, TID, Belia Marquez PA-C, 100 mg at 12/05/19 0829    insulin glargine (LANTUS) subcutaneous injection 5 Units 0 05 mL, 5 Units, Subcutaneous, HS, Cheri Mei MD, 5 Units at 12/04/19 2122    insulin lispro (HumaLOG) 100 units/mL subcutaneous injection 1-6 Units, 1-6 Units, Subcutaneous, TID AC, 1 Units at 12/01/19 1614 **AND** Fingerstick Glucose (POCT), , , TID AC, Javy Parsons PA-C    insulin lispro (HumaLOG) 100 units/mL subcutaneous injection 1-6 Units, 1-6 Units, Subcutaneous, HS, Javy Parsons PA-C, Stopped at 11/23/19 2147    levothyroxine tablet 125 mcg, 125 mcg, Oral, Early Morning, Cheri Mei MD, 125 mcg at 12/05/19 0506    lidocaine (LIDODERM) 5 % patch 1 patch, 1 patch, Topical, Daily, Laurent Shields MD, Stopped at 12/04/19 0831    loperamide (IMODIUM) capsule 2 mg, 2 mg, Oral, 4x Daily PRN, Sammie Westbrook MD, 2 mg at 12/05/19 0506    LORazepam (ATIVAN) tablet 2 mg, 2 mg, Oral, TID PRN, Cheri Mei MD, 2 mg at 12/05/19 0840    metoprolol tartrate (LOPRESSOR) tablet 50 mg, 50 mg, Oral, Q12H [facility] 473, PA-C, 50 mg at 12/05/19 0830    multi-electrolyte (PLASMALYTE-A/ISOLYTE-S PH 7 4) IV solution, 75 mL/hr, Intravenous, Continuous, MER Kirby, Last Rate: 75 mL/hr at 12/05/19 1007, 75 mL/hr at 12/05/19 1007   ondansetron (ZOFRAN) injection 4 mg, 4 mg, Intravenous, Q8H PRN, Sushil Ricks PA-C, 4 mg at 12/02/19 0645    pravastatin (PRAVACHOL) tablet 80 mg, 80 mg, Oral, Daily, Mason Ferguson MD, 80 mg at 12/05/19 0830    predniSONE tablet 5 mg, 5 mg, Oral, Daily, Mason Ferguson MD, 5 mg at 12/05/19 5576    promethazine (PHENERGAN) injection 25 mg, 25 mg, Intramuscular, Q6H PRN, Elvira Lopez PA-C    saccharomyces boulardii (FLORASTOR) capsule 250 mg, 250 mg, Oral, BID, Tomer Blackburn MD, 250 mg at 12/05/19 7443    sertraline (ZOLOFT) tablet 200 mg, 200 mg, Oral, Daily, Mason Ferguson MD, 200 mg at 12/05/19 8554    sevelamer (RENAGEL) tablet 800 mg, 800 mg, Oral, TID With Meals, Mason Ferguson MD, 800 mg at 12/05/19 0830    sodium bicarbonate tablet 1,300 mg, 1,300 mg, Oral, TID, Mason Ferguson MD, 1,300 mg at 12/05/19 0829    tacrolimus (PROGRAF) capsule 2 mg, 2 mg, Oral, HS, Marilee Vernon MD, 2 mg at 12/04/19 2122    tacrolimus (PROGRAF) capsule 3 mg, 3 mg, Oral, Daily, Marilee Vernon MD, 3 mg at 12/05/19 1220    Laboratory Results:  Results from last 7 days   Lab Units 12/05/19  0546 12/04/19  0530 12/03/19  0611 12/03/19  0606 12/02/19  0049 11/30/19  0442 11/29/19  0516   WBC Thousand/uL 5 08  --  5 96  --  8 02  --   --    HEMOGLOBIN g/dL 6 9*  --  7 2*  --  8 5* 7 9* 8 1*   HEMATOCRIT % 22 3*  --  24 0*  --  27 0* 26 1* 26 6*   PLATELETS Thousands/uL 136*  --  136*  --  141*  --   --    POTASSIUM mmol/L 3 4* 3 8  --  3 5 3 7 4 4  --    CHLORIDE mmol/L 106 108  --  109* 108 110*  --    CO2 mmol/L 17* 17*  --  17* 19* 18*  --    BUN mg/dL 57* 56*  --  54* 47* 41*  --    CREATININE mg/dL 3 04* 3 39*  --  3 33* 2 64* 2 71*  --    CALCIUM mg/dL 8 5 8 4  --  8 5 9 0 9 0  --    PHOSPHORUS mg/dL  --  4 7*  --   --   --   --   --

## 2019-12-05 NOTE — ASSESSMENT & PLAN NOTE
Lab Results   Component Value Date    HGBA1C 5 1 09/09/2019       Recent Labs     12/04/19  1539 12/04/19  2114 12/05/19  0731 12/05/19  1124   POCGLU 125 102 113 123       Blood Sugar Average: Last 72 hrs:  · (P) 120 9  · Continue Lantus 5 units q h s   With correctional scale insulin  · Blood glucose levels are stable and acceptable

## 2019-12-05 NOTE — PLAN OF CARE
Problem: Potential for Falls  Goal: Patient will remain free of falls  Description  INTERVENTIONS:  - Assess patient frequently for physical needs  -  Identify cognitive and physical deficits and behaviors that affect risk of falls    -  [place] fall precautions as indicated by assessment   - Educate patient/family on patient safety including physical limitations  - Instruct patient to call for assistance with activity based on assessment  - Modify environment to reduce risk of injury  - Consider OT/PT consult to assist with strengthening/mobility  Outcome: Progressing     Problem: PAIN - ADULT  Goal: Verbalizes/displays adequate comfort level or baseline comfort level  Description  Interventions:  - Encourage patient to monitor pain and request assistance  - Assess pain using appropriate pain scale  - Administer analgesics based on type and severity of pain and evaluate response  - Implement non-pharmacological measures as appropriate and evaluate response  - Consider cultural and social influences on pain and pain management  - Notify physician/advanced practitioner if interventions unsuccessful or patient reports new pain  Outcome: Progressing     Problem: INFECTION - ADULT  Goal: Absence or prevention of progression during hospitalization  Description  INTERVENTIONS:  - Assess and monitor for signs and symptoms of infection  - Monitor lab/diagnostic results  - Monitor all insertion sites, i e  indwelling lines, tubes, and drains  - Monitor endotracheal if appropriate and nasal secretions for changes in amount and color  - [place] appropriate cooling/warming therapies per order  - Administer medications as ordered  - Instruct and encourage patient and family to use good hand hygiene technique  - Identify and instruct in appropriate isolation precautions for identified infection/condition  Outcome: Progressing     Problem: GENITOURINARY - ADULT  Goal: Maintains or returns to baseline urinary function  Description  INTERVENTIONS:  - Assess urinary function  - Encourage oral fluids to ensure adequate hydration if ordered  - Administer IV fluids as ordered to ensure adequate hydration  - Administer ordered medications as needed  - Offer frequent toileting  - Follow urinary retention protocol if ordered  Outcome: Progressing  Goal: Absence of urinary retention  Description  INTERVENTIONS:  - Assess patient's ability to void and empty bladder  - Monitor I/O  - Bladder scan as needed  - Discuss with physician/AP medications to alleviate retention as needed  - Discuss catheterization for long term situations as appropriate   Outcome: Progressing     Problem: Nutrition/Hydration-ADULT  Goal: Nutrient/Hydration intake appropriate for improving, restoring or maintaining nutritional needs  Description  Monitor and assess patient's nutrition/hydration status for malnutrition  Collaborate with interdisciplinary team and initiate plan and interventions as ordered  Monitor patient's weight and dietary intake as ordered or per policy  Utilize nutrition screening tool and intervene as necessary  Determine patient's food preferences and provide high-protein, high-caloric foods as appropriate       INTERVENTIONS:  - Monitor oral intake, urinary output, labs, and treatment plans  - Assess nutrition and hydration status and recommend course of action  - Evaluate amount of meals eaten  - Assist patient with eating if necessary   - Allow adequate time for meals  - Recommend/ encourage appropriate diets, oral nutritional supplements, and vitamin/mineral supplements  - Order, calculate, and assess calorie counts as needed  - Recommend, monitor, and adjust tube feedings and TPN/PPN based on assessed needs  - Assess need for intravenous fluids  - Provide specific nutrition/hydration education as appropriate  - Include patient/family/caregiver in decisions related to nutrition  Outcome: Progressing

## 2019-12-06 ENCOUNTER — TRANSITIONAL CARE MANAGEMENT (OUTPATIENT)
Dept: FAMILY MEDICINE CLINIC | Facility: CLINIC | Age: 54
End: 2019-12-06

## 2019-12-06 VITALS
TEMPERATURE: 97.9 F | BODY MASS INDEX: 35.68 KG/M2 | SYSTOLIC BLOOD PRESSURE: 168 MMHG | DIASTOLIC BLOOD PRESSURE: 69 MMHG | OXYGEN SATURATION: 95 % | HEART RATE: 62 BPM | HEIGHT: 68 IN | RESPIRATION RATE: 18 BRPM | WEIGHT: 235.45 LBS

## 2019-12-06 LAB
ABO GROUP BLD BPU: NORMAL
ANION GAP SERPL CALCULATED.3IONS-SCNC: 13 MMOL/L (ref 4–13)
BPU ID: NORMAL
BUN SERPL-MCNC: 51 MG/DL (ref 5–25)
CALCIUM SERPL-MCNC: 8.3 MG/DL (ref 8.3–10.1)
CHLORIDE SERPL-SCNC: 109 MMOL/L (ref 100–108)
CO2 SERPL-SCNC: 17 MMOL/L (ref 21–32)
CREAT SERPL-MCNC: 2.71 MG/DL (ref 0.6–1.3)
CROSSMATCH: NORMAL
ERYTHROCYTE [DISTWIDTH] IN BLOOD BY AUTOMATED COUNT: 16.2 % (ref 11.6–15.1)
GFR SERPL CREATININE-BSD FRML MDRD: 19 ML/MIN/1.73SQ M
GLUCOSE SERPL-MCNC: 101 MG/DL (ref 65–140)
GLUCOSE SERPL-MCNC: 110 MG/DL (ref 65–140)
HCT VFR BLD AUTO: 25.4 % (ref 34.8–46.1)
HGB BLD-MCNC: 8.3 G/DL (ref 11.5–15.4)
MCH RBC QN AUTO: 32.5 PG (ref 26.8–34.3)
MCHC RBC AUTO-ENTMCNC: 32.7 G/DL (ref 31.4–37.4)
MCV RBC AUTO: 100 FL (ref 82–98)
PLATELET # BLD AUTO: 152 THOUSANDS/UL (ref 149–390)
PMV BLD AUTO: 12.2 FL (ref 8.9–12.7)
POTASSIUM SERPL-SCNC: 3.7 MMOL/L (ref 3.5–5.3)
RBC # BLD AUTO: 2.55 MILLION/UL (ref 3.81–5.12)
SODIUM SERPL-SCNC: 139 MMOL/L (ref 136–145)
UNIT DISPENSE STATUS: NORMAL
UNIT PRODUCT CODE: NORMAL
UNIT RH: NORMAL
WBC # BLD AUTO: 5 THOUSAND/UL (ref 4.31–10.16)

## 2019-12-06 PROCEDURE — 99239 HOSP IP/OBS DSCHRG MGMT >30: CPT | Performed by: INTERNAL MEDICINE

## 2019-12-06 PROCEDURE — 99232 SBSQ HOSP IP/OBS MODERATE 35: CPT | Performed by: INTERNAL MEDICINE

## 2019-12-06 PROCEDURE — 80048 BASIC METABOLIC PNL TOTAL CA: CPT | Performed by: PHYSICIAN ASSISTANT

## 2019-12-06 PROCEDURE — 82948 REAGENT STRIP/BLOOD GLUCOSE: CPT

## 2019-12-06 PROCEDURE — 85027 COMPLETE CBC AUTOMATED: CPT | Performed by: PHYSICIAN ASSISTANT

## 2019-12-06 RX ADMIN — CLONIDINE HYDROCHLORIDE 0.3 MG: 0.1 TABLET ORAL at 05:16

## 2019-12-06 RX ADMIN — SODIUM BICARBONATE 650 MG TABLET 1300 MG: at 08:18

## 2019-12-06 RX ADMIN — Medication 250 MG: at 08:19

## 2019-12-06 RX ADMIN — METOPROLOL TARTRATE 50 MG: 50 TABLET, FILM COATED ORAL at 08:18

## 2019-12-06 RX ADMIN — FERROUS SULFATE TAB 325 MG (65 MG ELEMENTAL FE) 325 MG: 325 (65 FE) TAB at 08:19

## 2019-12-06 RX ADMIN — AMLODIPINE BESYLATE 10 MG: 10 TABLET ORAL at 08:18

## 2019-12-06 RX ADMIN — BUSPIRONE HYDROCHLORIDE 5 MG: 5 TABLET ORAL at 08:18

## 2019-12-06 RX ADMIN — SEVELAMER HYDROCHLORIDE 800 MG: 800 TABLET, FILM COATED PARENTERAL at 08:19

## 2019-12-06 RX ADMIN — HEPARIN SODIUM 5000 UNITS: 5000 INJECTION INTRAVENOUS; SUBCUTANEOUS at 05:16

## 2019-12-06 RX ADMIN — CHOLECALCIFEROL TAB 25 MCG (1000 UNIT) 1000 UNITS: 25 TAB at 08:19

## 2019-12-06 RX ADMIN — LEVOTHYROXINE SODIUM 125 MCG: 125 TABLET ORAL at 05:16

## 2019-12-06 RX ADMIN — LORAZEPAM 2 MG: 1 TABLET ORAL at 08:18

## 2019-12-06 RX ADMIN — PREDNISONE 5 MG: 5 TABLET ORAL at 08:18

## 2019-12-06 RX ADMIN — PRAVASTATIN SODIUM 80 MG: 80 TABLET ORAL at 08:18

## 2019-12-06 RX ADMIN — SERTRALINE HYDROCHLORIDE 200 MG: 100 TABLET ORAL at 08:18

## 2019-12-06 RX ADMIN — DULOXETINE HYDROCHLORIDE 60 MG: 60 CAPSULE, DELAYED RELEASE ORAL at 08:19

## 2019-12-06 RX ADMIN — TACROLIMUS 3 MG: 1 CAPSULE ORAL at 08:18

## 2019-12-06 RX ADMIN — FOLIC ACID 1000 MCG: 1 TABLET ORAL at 08:18

## 2019-12-06 RX ADMIN — ASPIRIN 81 MG: 81 TABLET, COATED ORAL at 08:18

## 2019-12-06 RX ADMIN — HYDRALAZINE HYDROCHLORIDE 100 MG: 25 TABLET ORAL at 08:19

## 2019-12-06 NOTE — PLAN OF CARE
Problem: Potential for Falls  Goal: Patient will remain free of falls  Description  INTERVENTIONS:  - Assess patient frequently for physical needs  -  Identify cognitive and physical deficits and behaviors that affect risk of falls    -  [place] fall precautions as indicated by assessment   - Educate patient/family on patient safety including physical limitations  - Instruct patient to call for assistance with activity based on assessment  - Modify environment to reduce risk of injury  - Consider OT/PT consult to assist with strengthening/mobility  Outcome: Adequate for Discharge     Problem: PAIN - ADULT  Goal: Verbalizes/displays adequate comfort level or baseline comfort level  Description  Interventions:  - Encourage patient to monitor pain and request assistance  - Assess pain using appropriate pain scale  - Administer analgesics based on type and severity of pain and evaluate response  - Implement non-pharmacological measures as appropriate and evaluate response  - Consider cultural and social influences on pain and pain management  - Notify physician/advanced practitioner if interventions unsuccessful or patient reports new pain  Outcome: Adequate for Discharge     Problem: INFECTION - ADULT  Goal: Absence or prevention of progression during hospitalization  Description  INTERVENTIONS:  - Assess and monitor for signs and symptoms of infection  - Monitor lab/diagnostic results  - Monitor all insertion sites, i e  indwelling lines, tubes, and drains  - Monitor endotracheal if appropriate and nasal secretions for changes in amount and color  - [place] appropriate cooling/warming therapies per order  - Administer medications as ordered  - Instruct and encourage patient and family to use good hand hygiene technique  - Identify and instruct in appropriate isolation precautions for identified infection/condition  Outcome: Adequate for Discharge     Problem: GENITOURINARY - ADULT  Goal: Maintains or returns to baseline urinary function  Description  INTERVENTIONS:  - Assess urinary function  - Encourage oral fluids to ensure adequate hydration if ordered  - Administer IV fluids as ordered to ensure adequate hydration  - Administer ordered medications as needed  - Offer frequent toileting  - Follow urinary retention protocol if ordered  Outcome: Adequate for Discharge  Goal: Absence of urinary retention  Description  INTERVENTIONS:  - Assess patient's ability to void and empty bladder  - Monitor I/O  - Bladder scan as needed  - Discuss with physician/AP medications to alleviate retention as needed  - Discuss catheterization for long term situations as appropriate   Outcome: Adequate for Discharge     Problem: Nutrition/Hydration-ADULT  Goal: Nutrient/Hydration intake appropriate for improving, restoring or maintaining nutritional needs  Description  Monitor and assess patient's nutrition/hydration status for malnutrition  Collaborate with interdisciplinary team and initiate plan and interventions as ordered  Monitor patient's weight and dietary intake as ordered or per policy  Utilize nutrition screening tool and intervene as necessary  Determine patient's food preferences and provide high-protein, high-caloric foods as appropriate       INTERVENTIONS:  - Monitor oral intake, urinary output, labs, and treatment plans  - Assess nutrition and hydration status and recommend course of action  - Evaluate amount of meals eaten  - Assist patient with eating if necessary   - Allow adequate time for meals  - Recommend/ encourage appropriate diets, oral nutritional supplements, and vitamin/mineral supplements  - Order, calculate, and assess calorie counts as needed  - Recommend, monitor, and adjust tube feedings and TPN/PPN based on assessed needs  - Assess need for intravenous fluids  - Provide specific nutrition/hydration education as appropriate  - Include patient/family/caregiver in decisions related to nutrition  Outcome: Adequate for Discharge

## 2019-12-06 NOTE — DISCHARGE SUMMARY
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 ([facility] [address] )    Acute renal failure superimposed on stage 4 [facility] )    Renal transplant recipient    Multiple myeloma not having achieved remission [facility])    Ambulatory dysfunction    Essential hypertension    Anemia      Consultations During Hospital Stay:  · Nephrology  · Infectious Disease    Procedures Performed:   · None    Significant findings:  · CT abdomen/pelvis  1  Circumferential mural thickening of the sigmoid colon, new since the prior exam and suspicious for colitis    2   Unchanged mild fat stranding around the transplant kidney, nonspecific  No hydronephrosis    3   Small volume of ascites and small bilateral pleural effusions, new since the prior exam    4   Atrophic right lower quadrant with exocrine drainage to the urinary bladder      5  Punctate focus of air within the upper endometrial cavity (series 602, image 81), nonspecific and new since the prior exam   Correlate with pelvic exam findings    6   Right native kidney lower pole 1 5 cm lesion, incompletely characterized but increased in size compared to 11/16/2017  Recommend further evaluation with ultrasound on nonemergent basis  Hospital Course:    Brien Willett is a 53 y o  female patient with past medical history significant for kidney and pancreas transplant on immunosuppressive medications, type 1 diabetes, multiple myeloma on Revlimid, anemia of chronic disease, hypertension who originally presented to the hospital on 11/23/2019 due to recurrent UTI  Patient had recently been admitted to [facility] on 11/5/19 with urinary complaints  She was found to have a UTI at that time and treated  She was discharged with a Moreno catheter at that time  Catheter was discontinued 11/22/19 and patient was trained on self catheterization  She presented with fever elevated blood pressure and foul-smelling urine, nausea and abdominal pain  Following admission, urine cultures came back positive for VRE  She was treated with a 7 day course of daptomycin  She had resolution of fever  Hospital course was complicated by acute kidney injury on CKD  She was seen by the nephrology team   Renal function improved back to baseline prior to discharge  She also did receive PRBC transfusion due to anemia during her hospital course  Hospital course was also complicated by diarrhea which was attributed to the use of stool softeners and laxatives  These were discontinued  Stool C diff toxin was negative  Patient was recommended for home with [facility]NA/home PT on discharge  Condition at Discharge: stable     Discharge Day Visit / Exam:     Subjective:  No new complaints or acute overnight events    She is eager for discharge home today    Vitals: Blood Pressure: 168/69 (12/06/19 0757)  Pulse: 62 (12/06/19 0757)  Temperature: 97 9 °F (36 6 °C) (12/06/19 0757)  Temp Source: Oral (12/06/19 0757)  Respirations: 18 (12/06/19 0757)  Height: 5' 8" (172 7 cm) (12/02/19 1306)  Weight - Scale: 107 kg (235 lb 7 2 oz) (12/06/19 0600)  SpO2: 95 % (12/06/19 0757)    General Appearance:    Alert, cooperative, no distress, appropriately responsive    Head:    Normocephalic, without obvious abnormality, atraumatic, mucous membranes moist    Eyes:    Conjunctiva/corneas clear, EOM's intact   Neck:   Supple   Lungs:     Clear to auscultation bilaterally, respirations unlabored, no crackles or wheeze Heart:    Regular rate and rhythm, S1 and S2    Abdomen:     Soft, obese, non-tender, bowel sounds active all four quadrants,     no masses, no organomegaly   Extremities:   Extremities normal, atraumatic, no cyanosis or edema   Neurologic:  nonfocal      Discharge instructions/Information to patient and family:   See after visit summary for information provided to patient and family  Provisions for Follow-Up Care:  See after visit summary for information related to follow-up care and any pertinent home health orders  Disposition: Home with [facility]NA      Discharge Statement:  I spent >30 minutes discharging the patient  This time was spent on the day of discharge  I had direct contact with the patient on the day of discharge  Greater than 50% of the total time was spent examining patient, answering all patient questions, arranging and discussing plan of care with patient as well as directly providing post-discharge instructions  Additional time then spent on discharge activities  Discharge Medications:  See after visit summary for reconciled discharge medications provided to patient and family  ** Please Note: Dragon 360 Dictation voice to text software may have been used in the creation of this document   **

## 2019-12-06 NOTE — PROGRESS NOTES
Progress Note - Infectious Disease   Clare Obando 53 y o  female MRN: [id number]  Unit/Bed#: S -01 Encounter: [id number]      Impression/Recommendations:  1  VRE UTI   Patient is clinically well on IV daptomycin   No evidence of pyelonephritis clinically   Patient has remained systemically well, without sepsis or systemic toxicity   She completed antibiotic course  Despite recurrent UTI, no indication for prophylaxis  In addition, no option for prophylaxis  Observe off antibiotic  Monitor temperature/WBC  Monitor urinary symptoms      2  Diarrhea, most likely secondary to stool softener/laxatives   Diarrhea   resolved spontaneously  Stool C diff toxin negative  Monitor diarrhea      3  EDUARDO, superimposed on CKD   Creatinine slowly improved  Monitor      4  Status post kidney and pancreas transplant   Patient is on stable anti rejection regimen      5  Multiple myeloma, on Revlimid      Discussed with patient in detail regarding the above plan  Plan for discharge today noted  Regardless, with no further active infection, I will sign off  Thank you for the consultation  Please not hesitate to reconsult us for any questions or issues      Antibiotics:  Off antibiotic       Subjective:  Patient's energy improved after PRBC transfusion  No further diarrhea  No urinary symptoms or flank pain  Temperature stays down   No chills  Objective:  Vitals:  Temp:  [97 8 °F (36 6 °C)-98 3 °F (36 8 °C)] 97 9 °F (36 6 °C)  HR:  [53-62] 62  Resp:  [16-19] 18  BP: (158-170)/(50-73) 168/69  SpO2:  [93 %-96 %] 95 %  Temp (24hrs), Av 1 °F (36 7 °C), Min:97 8 °F (36 6 °C), Max:98 3 °F (36 8 °C)  Current: Temperature: 97 9 °F (36 6 °C)    Physical Exam:     General: Awake, alert, cooperative, no distress  Neck:  Supple  No mass  No lymphadenopathy  Lungs: Expansion symmetric, no rales, no wheezing, respirations unlabored  Heart:  Regular rate and rhythm, S1 and S2 normal, no murmur     Abdomen: Soft, nondistended, non-tender, bowel sounds active all four quadrants,        no masses, no organomegaly  Extremities: Stable mild leg edema  No erythema/warmth  No ulcer  Nontender to palpation  Skin:  No rash  Neuro: Moves all extremities  Invasive Devices     Peripheral Intravenous Line            Peripheral IV 12/04/19 Left;Ventral (anterior) Forearm 2 days                Labs studies:   I have personally reviewed pertinent labs  Results from last 7 days   Lab Units 12/06/19  0601 12/05/19  0546 12/04/19  0530 12/03/19  0606 12/02/19  0049   POTASSIUM mmol/L 3 7 3 4* 3 8 3 5 3 7   CHLORIDE mmol/L 109* 106 108 109* 108   CO2 mmol/L 17* 17* 17* 17* 19*   BUN mg/dL 51* 57* 56* 54* 47*   CREATININE mg/dL 2 71* 3 04* 3 39* 3 33* 2 64*   EGFR ml/min/1 73sq m 19 17 15 15 20   CALCIUM mg/dL 8 3 8 5 8 4 8 5 9 0   AST U/L  --   --   --  10 10   ALT U/L  --   --   --  11* 12   ALK PHOS U/L  --   --   --  79 87     Results from last 7 days   Lab Units 12/06/19  0601 12/05/19  0546 12/03/19  0611   WBC Thousand/uL 5 00 5 08 5 96   HEMOGLOBIN g/dL 8 3* 6 9* 7 2*   PLATELETS Thousands/uL 152 136* 136*     Results from last 7 days   Lab Units 12/03/19  1149   C DIFF TOXIN B  NEGATIVE for C difficle toxin by PCR  Imaging Studies:   I have personally reviewed pertinent imaging study reports and images in PACS  EKG, Pathology, and Other Studies:   I have personally reviewed pertinent reports

## 2019-12-06 NOTE — PLAN OF CARE
Problem: Potential for Falls  Goal: Patient will remain free of falls  Description  INTERVENTIONS:  - Assess patient frequently for physical needs  -  Identify cognitive and physical deficits and behaviors that affect risk of falls    -  [place] fall precautions as indicated by assessment   - Educate patient/family on patient safety including physical limitations  - Instruct patient to call for assistance with activity based on assessment  - Modify environment to reduce risk of injury  - Consider OT/PT consult to assist with strengthening/mobility  Outcome: Progressing     Problem: PAIN - ADULT  Goal: Verbalizes/displays adequate comfort level or baseline comfort level  Description  Interventions:  - Encourage patient to monitor pain and request assistance  - Assess pain using appropriate pain scale  - Administer analgesics based on type and severity of pain and evaluate response  - Implement non-pharmacological measures as appropriate and evaluate response  - Consider cultural and social influences on pain and pain management  - Notify physician/advanced practitioner if interventions unsuccessful or patient reports new pain  Outcome: Progressing     Problem: INFECTION - ADULT  Goal: Absence or prevention of progression during hospitalization  Description  INTERVENTIONS:  - Assess and monitor for signs and symptoms of infection  - Monitor lab/diagnostic results  - Monitor all insertion sites, i e  indwelling lines, tubes, and drains  - Monitor endotracheal if appropriate and nasal secretions for changes in amount and color  - [place] appropriate cooling/warming therapies per order  - Administer medications as ordered  - Instruct and encourage patient and family to use good hand hygiene technique  - Identify and instruct in appropriate isolation precautions for identified infection/condition  Outcome: Progressing     Problem: GENITOURINARY - ADULT  Goal: Maintains or returns to baseline urinary function  Description  INTERVENTIONS:  - Assess urinary function  - Encourage oral fluids to ensure adequate hydration if ordered  - Administer IV fluids as ordered to ensure adequate hydration  - Administer ordered medications as needed  - Offer frequent toileting  - Follow urinary retention protocol if ordered  Outcome: Progressing  Goal: Absence of urinary retention  Description  INTERVENTIONS:  - Assess patient's ability to void and empty bladder  - Monitor I/O  - Bladder scan as needed  - Discuss with physician/AP medications to alleviate retention as needed  - Discuss catheterization for long term situations as appropriate   Outcome: Progressing     Problem: Nutrition/Hydration-ADULT  Goal: Nutrient/Hydration intake appropriate for improving, restoring or maintaining nutritional needs  Description  Monitor and assess patient's nutrition/hydration status for malnutrition  Collaborate with interdisciplinary team and initiate plan and interventions as ordered  Monitor patient's weight and dietary intake as ordered or per policy  Utilize nutrition screening tool and intervene as necessary  Determine patient's food preferences and provide high-protein, high-caloric foods as appropriate       INTERVENTIONS:  - Monitor oral intake, urinary output, labs, and treatment plans  - Assess nutrition and hydration status and recommend course of action  - Evaluate amount of meals eaten  - Assist patient with eating if necessary   - Allow adequate time for meals  - Recommend/ encourage appropriate diets, oral nutritional supplements, and vitamin/mineral supplements  - Order, calculate, and assess calorie counts as needed  - Recommend, monitor, and adjust tube feedings and TPN/PPN based on assessed needs  - Assess need for intravenous fluids  - Provide specific nutrition/hydration education as appropriate  - Include patient/family/caregiver in decisions related to nutrition  Outcome: Progressing

## 2019-12-09 ENCOUNTER — APPOINTMENT (EMERGENCY)
Dept: RADIOLOGY | Facility: HOSPITAL | Age: 54
DRG: 391 | End: 2019-12-09
Payer: MEDICARE

## 2019-12-09 ENCOUNTER — TELEPHONE (OUTPATIENT)
Dept: HEMATOLOGY ONCOLOGY | Facility: CLINIC | Age: 54
End: 2019-12-09

## 2019-12-09 ENCOUNTER — EPISODE CHANGES (OUTPATIENT)
Dept: CASE MANAGEMENT | Facility: HOSPITAL | Age: 54
End: 2019-12-09

## 2019-12-09 ENCOUNTER — HOSPITAL ENCOUNTER (INPATIENT)
Facility: HOSPITAL | Age: 54
LOS: 23 days | Discharge: HOME | DRG: 391 | End: 2020-01-02
Attending: EMERGENCY MEDICINE | Admitting: INTERNAL MEDICINE
Payer: MEDICARE

## 2019-12-09 DIAGNOSIS — R10.9 ABDOMINAL PAIN: Primary | ICD-10-CM

## 2019-12-09 DIAGNOSIS — E86.0 DEHYDRATION: ICD-10-CM

## 2019-12-09 DIAGNOSIS — N39.0 URINARY TRACT INFECTION WITHOUT HEMATURIA, SITE UNSPECIFIED: ICD-10-CM

## 2019-12-09 DIAGNOSIS — Z94.0 RENAL TRANSPLANT RECIPIENT: ICD-10-CM

## 2019-12-09 DIAGNOSIS — K52.9 ENTEROCOLITIS: ICD-10-CM

## 2019-12-09 DIAGNOSIS — C90.00 MULTIPLE MYELOMA NOT HAVING ACHIEVED REMISSION (HCC): Primary | ICD-10-CM

## 2019-12-09 DIAGNOSIS — R60.9 EDEMA: ICD-10-CM

## 2019-12-09 DIAGNOSIS — R19.7 DIARRHEA: ICD-10-CM

## 2019-12-09 DIAGNOSIS — R53.83 LETHARGY: ICD-10-CM

## 2019-12-09 DIAGNOSIS — E87.2 METABOLIC ACIDOSIS: ICD-10-CM

## 2019-12-09 DIAGNOSIS — N18.4 CHRONIC KIDNEY DISEASE, STAGE 4 (SEVERE) (HCC): ICD-10-CM

## 2019-12-09 DIAGNOSIS — D84.9 IMMUNOSUPPRESSION (HCC): ICD-10-CM

## 2019-12-09 LAB
ALBUMIN SERPL BCP-MCNC: 3.2 G/DL (ref 3.5–5)
ALP SERPL-CCNC: 106 U/L (ref 46–116)
ALT SERPL W P-5'-P-CCNC: 17 U/L (ref 12–78)
ANION GAP SERPL CALCULATED.3IONS-SCNC: 13 MMOL/L (ref 4–13)
ANISOCYTOSIS BLD QL SMEAR: PRESENT
APTT PPP: 24 SECONDS (ref 23–37)
AST SERPL W P-5'-P-CCNC: 19 U/L (ref 5–45)
BACTERIA UR QL AUTO: ABNORMAL /HPF
BASOPHILS # BLD MANUAL: 0.19 THOUSAND/UL (ref 0–0.1)
BASOPHILS NFR MAR MANUAL: 2 % (ref 0–1)
BILIRUB SERPL-MCNC: 0.47 MG/DL (ref 0.2–1)
BILIRUB UR QL STRIP: NEGATIVE
BUN SERPL-MCNC: 46 MG/DL (ref 5–25)
CALCIUM SERPL-MCNC: 8.7 MG/DL (ref 8.3–10.1)
CHLORIDE SERPL-SCNC: 123 MMOL/L (ref 100–108)
CLARITY UR: ABNORMAL
CO2 SERPL-SCNC: 14 MMOL/L (ref 21–32)
COLOR UR: YELLOW
CREAT SERPL-MCNC: 2.36 MG/DL (ref 0.6–1.3)
EOSINOPHIL # BLD MANUAL: 0 THOUSAND/UL (ref 0–0.4)
EOSINOPHIL NFR BLD MANUAL: 0 % (ref 0–6)
ERYTHROCYTE [DISTWIDTH] IN BLOOD BY AUTOMATED COUNT: 16.2 % (ref 11.6–15.1)
GFR SERPL CREATININE-BSD FRML MDRD: 23 ML/MIN/1.73SQ M
GLUCOSE SERPL-MCNC: 124 MG/DL (ref 65–140)
GLUCOSE UR STRIP-MCNC: NEGATIVE MG/DL
HCT VFR BLD AUTO: 30.8 % (ref 34.8–46.1)
HGB BLD-MCNC: 9.9 G/DL (ref 11.5–15.4)
HGB UR QL STRIP.AUTO: ABNORMAL
HYALINE CASTS #/AREA URNS LPF: ABNORMAL /LPF
INR PPP: 1.21 (ref 0.84–1.19)
KETONES UR STRIP-MCNC: ABNORMAL MG/DL
LACTATE SERPL-SCNC: 1.6 MMOL/L (ref 0.5–2)
LEUKOCYTE ESTERASE UR QL STRIP: ABNORMAL
LYMPHOCYTES # BLD AUTO: 0.38 THOUSAND/UL (ref 0.6–4.47)
LYMPHOCYTES # BLD AUTO: 4 % (ref 14–44)
MACROCYTES BLD QL AUTO: PRESENT
MCH RBC QN AUTO: 31.8 PG (ref 26.8–34.3)
MCHC RBC AUTO-ENTMCNC: 32.1 G/DL (ref 31.4–37.4)
MCV RBC AUTO: 99 FL (ref 82–98)
MONOCYTES # BLD AUTO: 0.19 THOUSAND/UL (ref 0–1.22)
MONOCYTES NFR BLD: 2 % (ref 4–12)
NEUTROPHILS # BLD MANUAL: 8.53 THOUSAND/UL (ref 1.85–7.62)
NEUTS SEG NFR BLD AUTO: 89 % (ref 43–75)
NITRITE UR QL STRIP: NEGATIVE
NON-SQ EPI CELLS URNS QL MICRO: ABNORMAL /HPF
NRBC BLD AUTO-RTO: 0 /100 WBCS
PH UR STRIP.AUTO: 7 [PH]
PLATELET # BLD AUTO: 223 THOUSANDS/UL (ref 149–390)
PLATELET BLD QL SMEAR: ADEQUATE
PMV BLD AUTO: 11.8 FL (ref 8.9–12.7)
POIKILOCYTOSIS BLD QL SMEAR: PRESENT
POLYCHROMASIA BLD QL SMEAR: PRESENT
POTASSIUM SERPL-SCNC: 3.5 MMOL/L (ref 3.5–5.3)
PROCALCITONIN SERPL-MCNC: 0.3 NG/ML
PROT SERPL-MCNC: 7 G/DL (ref 6.4–8.2)
PROT UR STRIP-MCNC: ABNORMAL MG/DL
PROTHROMBIN TIME: 14.9 SECONDS (ref 11.6–14.5)
RBC # BLD AUTO: 3.11 MILLION/UL (ref 3.81–5.12)
RBC #/AREA URNS AUTO: ABNORMAL /HPF
RBC MORPH BLD: PRESENT
SODIUM SERPL-SCNC: 150 MMOL/L (ref 136–145)
SP GR UR STRIP.AUTO: 1.01 (ref 1–1.03)
UROBILINOGEN UR QL STRIP.AUTO: 0.2 E.U./DL
VARIANT LYMPHS # BLD AUTO: 3 %
WBC # BLD AUTO: 9.58 THOUSAND/UL (ref 4.31–10.16)
WBC #/AREA URNS AUTO: ABNORMAL /HPF

## 2019-12-09 PROCEDURE — 84145 PROCALCITONIN (PCT): CPT | Performed by: EMERGENCY MEDICINE

## 2019-12-09 PROCEDURE — 87185 SC STD ENZYME DETCJ PER NZM: CPT | Performed by: EMERGENCY MEDICINE

## 2019-12-09 PROCEDURE — 85730 THROMBOPLASTIN TIME PARTIAL: CPT | Performed by: EMERGENCY MEDICINE

## 2019-12-09 PROCEDURE — 81001 URINALYSIS AUTO W/SCOPE: CPT | Performed by: EMERGENCY MEDICINE

## 2019-12-09 PROCEDURE — 87076 CULTURE ANAEROBE IDENT EACH: CPT | Performed by: EMERGENCY MEDICINE

## 2019-12-09 PROCEDURE — 85007 BL SMEAR W/DIFF WBC COUNT: CPT | Performed by: EMERGENCY MEDICINE

## 2019-12-09 PROCEDURE — 99285 EMERGENCY DEPT VISIT HI MDM: CPT

## 2019-12-09 PROCEDURE — 87186 SC STD MICRODIL/AGAR DIL: CPT | Performed by: EMERGENCY MEDICINE

## 2019-12-09 PROCEDURE — 87086 URINE CULTURE/COLONY COUNT: CPT | Performed by: EMERGENCY MEDICINE

## 2019-12-09 PROCEDURE — 87040 BLOOD CULTURE FOR BACTERIA: CPT | Performed by: EMERGENCY MEDICINE

## 2019-12-09 PROCEDURE — 93005 ELECTROCARDIOGRAM TRACING: CPT

## 2019-12-09 PROCEDURE — 36415 COLL VENOUS BLD VENIPUNCTURE: CPT

## 2019-12-09 PROCEDURE — 96375 TX/PRO/DX INJ NEW DRUG ADDON: CPT

## 2019-12-09 PROCEDURE — 87181 SC STD AGAR DILUTION PER AGT: CPT | Performed by: EMERGENCY MEDICINE

## 2019-12-09 PROCEDURE — 83605 ASSAY OF LACTIC ACID: CPT | Performed by: EMERGENCY MEDICINE

## 2019-12-09 PROCEDURE — 85027 COMPLETE CBC AUTOMATED: CPT | Performed by: EMERGENCY MEDICINE

## 2019-12-09 PROCEDURE — 80053 COMPREHEN METABOLIC PANEL: CPT | Performed by: EMERGENCY MEDICINE

## 2019-12-09 PROCEDURE — 85610 PROTHROMBIN TIME: CPT | Performed by: EMERGENCY MEDICINE

## 2019-12-09 PROCEDURE — 96365 THER/PROPH/DIAG IV INF INIT: CPT

## 2019-12-09 PROCEDURE — 87077 CULTURE AEROBIC IDENTIFY: CPT | Performed by: EMERGENCY MEDICINE

## 2019-12-09 PROCEDURE — 99285 EMERGENCY DEPT VISIT HI MDM: CPT | Performed by: EMERGENCY MEDICINE

## 2019-12-09 PROCEDURE — 96366 THER/PROPH/DIAG IV INF ADDON: CPT

## 2019-12-09 RX ORDER — ONDANSETRON 2 MG/ML
4 INJECTION INTRAMUSCULAR; INTRAVENOUS ONCE
Status: COMPLETED | OUTPATIENT
Start: 2019-12-09 | End: 2019-12-09

## 2019-12-09 RX ORDER — DICYCLOMINE HCL 20 MG
20 TABLET ORAL ONCE
Status: COMPLETED | OUTPATIENT
Start: 2019-12-09 | End: 2019-12-09

## 2019-12-09 RX ORDER — HYDROMORPHONE HCL/PF 1 MG/ML
1 SYRINGE (ML) INJECTION ONCE
Status: COMPLETED | OUTPATIENT
Start: 2019-12-09 | End: 2019-12-09

## 2019-12-09 RX ORDER — LENALIDOMIDE 5 MG/1
CAPSULE ORAL
Qty: 11 CAPSULE | Refills: 0 | Status: SHIPPED | OUTPATIENT
Start: 2019-12-09 | End: 2020-01-23 | Stop reason: HOSPADM

## 2019-12-09 RX ADMIN — HYDROMORPHONE HYDROCHLORIDE 1 MG: 1 INJECTION, SOLUTION INTRAMUSCULAR; INTRAVENOUS; SUBCUTANEOUS at 22:01

## 2019-12-09 RX ADMIN — DICYCLOMINE HYDROCHLORIDE 20 MG: 20 TABLET ORAL at 21:41

## 2019-12-09 RX ADMIN — ONDANSETRON 4 MG: 2 INJECTION INTRAMUSCULAR; INTRAVENOUS at 21:39

## 2019-12-09 RX ADMIN — IOHEXOL 50 ML: 240 INJECTION, SOLUTION INTRATHECAL; INTRAVASCULAR; INTRAVENOUS; ORAL at 21:47

## 2019-12-09 RX ADMIN — SODIUM CHLORIDE, SODIUM LACTATE, POTASSIUM CHLORIDE, AND CALCIUM CHLORIDE 1000 ML: .6; .31; .03; .02 INJECTION, SOLUTION INTRAVENOUS at 21:44

## 2019-12-10 ENCOUNTER — TELEPHONE (OUTPATIENT)
Dept: HEMATOLOGY ONCOLOGY | Facility: CLINIC | Age: 54
End: 2019-12-10

## 2019-12-10 ENCOUNTER — APPOINTMENT (EMERGENCY)
Dept: RADIOLOGY | Facility: HOSPITAL | Age: 54
DRG: 391 | End: 2019-12-10
Payer: MEDICARE

## 2019-12-10 ENCOUNTER — EPISODE CHANGES (OUTPATIENT)
Dept: CASE MANAGEMENT | Facility: OTHER | Age: 54
End: 2019-12-10

## 2019-12-10 ENCOUNTER — HOSPITAL ENCOUNTER (OUTPATIENT)
Dept: INFUSION CENTER | Facility: HOSPITAL | Age: 54
End: 2019-12-10
Attending: INTERNAL MEDICINE

## 2019-12-10 LAB
ANION GAP SERPL CALCULATED.3IONS-SCNC: 10 MMOL/L (ref 4–13)
ATRIAL RATE: 109 BPM
ATRIAL RATE: 109 BPM
BASE EX.OXY STD BLDV CALC-SCNC: 96.2 % (ref 60–80)
BASE EXCESS BLDV CALC-SCNC: -10.5 MMOL/L
BUN SERPL-MCNC: 41 MG/DL (ref 5–25)
CALCIUM SERPL-MCNC: 8.4 MG/DL (ref 8.3–10.1)
CHLORIDE SERPL-SCNC: 120 MMOL/L (ref 100–108)
CO2 SERPL-SCNC: 16 MMOL/L (ref 21–32)
CREAT SERPL-MCNC: 2.29 MG/DL (ref 0.6–1.3)
ERYTHROCYTE [DISTWIDTH] IN BLOOD BY AUTOMATED COUNT: 16.5 % (ref 11.6–15.1)
GFR SERPL CREATININE-BSD FRML MDRD: 23 ML/MIN/1.73SQ M
GLUCOSE SERPL-MCNC: 80 MG/DL (ref 65–140)
GLUCOSE SERPL-MCNC: 81 MG/DL (ref 65–140)
GLUCOSE SERPL-MCNC: 82 MG/DL (ref 65–140)
GLUCOSE SERPL-MCNC: 88 MG/DL (ref 65–140)
GLUCOSE SERPL-MCNC: 91 MG/DL (ref 65–140)
GLUCOSE SERPL-MCNC: 93 MG/DL (ref 65–140)
GLUCOSE SERPL-MCNC: 98 MG/DL (ref 65–140)
GLUCOSE SERPL-MCNC: 99 MG/DL (ref 65–140)
HCO3 BLDV-SCNC: 14.1 MMOL/L (ref 24–30)
HCT VFR BLD AUTO: 28.4 % (ref 34.8–46.1)
HGB BLD-MCNC: 9.2 G/DL (ref 11.5–15.4)
MAGNESIUM SERPL-MCNC: 1.8 MG/DL (ref 1.6–2.6)
MCH RBC QN AUTO: 31.9 PG (ref 26.8–34.3)
MCHC RBC AUTO-ENTMCNC: 32.4 G/DL (ref 31.4–37.4)
MCV RBC AUTO: 99 FL (ref 82–98)
O2 CT BLDV-SCNC: 13 ML/DL
P AXIS: 72 DEGREES
P AXIS: 72 DEGREES
PCO2 BLDV: 27.2 MM HG (ref 42–50)
PH BLDV: 7.33 [PH] (ref 7.3–7.4)
PLATELET # BLD AUTO: 184 THOUSANDS/UL (ref 149–390)
PMV BLD AUTO: 11.7 FL (ref 8.9–12.7)
PO2 BLDV: 149.3 MM HG (ref 35–45)
POTASSIUM SERPL-SCNC: 3.2 MMOL/L (ref 3.5–5.3)
PR INTERVAL: 152 MS
PR INTERVAL: 152 MS
QRS AXIS: -35 DEGREES
QRS AXIS: -35 DEGREES
QRSD INTERVAL: 82 MS
QRSD INTERVAL: 82 MS
QT INTERVAL: 394 MS
QT INTERVAL: 394 MS
QTC INTERVAL: 530 MS
QTC INTERVAL: 530 MS
RBC # BLD AUTO: 2.88 MILLION/UL (ref 3.81–5.12)
SODIUM SERPL-SCNC: 146 MMOL/L (ref 136–145)
T WAVE AXIS: 51 DEGREES
T WAVE AXIS: 51 DEGREES
TACROLIMUS BLD-MCNC: 4.4 NG/ML (ref 2–20)
VENTRICULAR RATE: 109 BPM
VENTRICULAR RATE: 109 BPM
WBC # BLD AUTO: 10.1 THOUSAND/UL (ref 4.31–10.16)

## 2019-12-10 PROCEDURE — 99222 1ST HOSP IP/OBS MODERATE 55: CPT | Performed by: INTERNAL MEDICINE

## 2019-12-10 PROCEDURE — 83735 ASSAY OF MAGNESIUM: CPT | Performed by: INTERNAL MEDICINE

## 2019-12-10 PROCEDURE — 82805 BLOOD GASES W/O2 SATURATION: CPT | Performed by: INTERNAL MEDICINE

## 2019-12-10 PROCEDURE — 87505 NFCT AGENT DETECTION GI: CPT | Performed by: NURSE PRACTITIONER

## 2019-12-10 PROCEDURE — 87493 C DIFF AMPLIFIED PROBE: CPT | Performed by: NURSE PRACTITIONER

## 2019-12-10 PROCEDURE — 85027 COMPLETE CBC AUTOMATED: CPT | Performed by: INTERNAL MEDICINE

## 2019-12-10 PROCEDURE — 93010 ELECTROCARDIOGRAM REPORT: CPT | Performed by: INTERNAL MEDICINE

## 2019-12-10 PROCEDURE — 96366 THER/PROPH/DIAG IV INF ADDON: CPT

## 2019-12-10 PROCEDURE — 74176 CT ABD & PELVIS W/O CONTRAST: CPT

## 2019-12-10 PROCEDURE — 89055 LEUKOCYTE ASSESSMENT FECAL: CPT | Performed by: NURSE PRACTITIONER

## 2019-12-10 PROCEDURE — 36415 COLL VENOUS BLD VENIPUNCTURE: CPT | Performed by: INTERNAL MEDICINE

## 2019-12-10 PROCEDURE — 80048 BASIC METABOLIC PNL TOTAL CA: CPT | Performed by: INTERNAL MEDICINE

## 2019-12-10 PROCEDURE — 99223 1ST HOSP IP/OBS HIGH 75: CPT | Performed by: INTERNAL MEDICINE

## 2019-12-10 PROCEDURE — 82948 REAGENT STRIP/BLOOD GLUCOSE: CPT

## 2019-12-10 PROCEDURE — 80197 ASSAY OF TACROLIMUS: CPT | Performed by: INTERNAL MEDICINE

## 2019-12-10 RX ORDER — METOPROLOL TARTRATE 50 MG/1
50 TABLET, FILM COATED ORAL EVERY 12 HOURS SCHEDULED
Status: DISCONTINUED | OUTPATIENT
Start: 2019-12-10 | End: 2020-01-02 | Stop reason: HOSPADM

## 2019-12-10 RX ORDER — FERROUS SULFATE 325(65) MG
325 TABLET ORAL
Status: DISCONTINUED | OUTPATIENT
Start: 2019-12-10 | End: 2020-01-02 | Stop reason: HOSPADM

## 2019-12-10 RX ORDER — PREDNISONE 1 MG/1
5 TABLET ORAL DAILY
Status: DISCONTINUED | OUTPATIENT
Start: 2019-12-10 | End: 2020-01-02 | Stop reason: HOSPADM

## 2019-12-10 RX ORDER — SERTRALINE HYDROCHLORIDE 100 MG/1
200 TABLET, FILM COATED ORAL DAILY
Status: DISCONTINUED | OUTPATIENT
Start: 2019-12-10 | End: 2020-01-02 | Stop reason: HOSPADM

## 2019-12-10 RX ORDER — LORAZEPAM 1 MG/1
2 TABLET ORAL 3 TIMES DAILY PRN
Status: DISCONTINUED | OUTPATIENT
Start: 2019-12-10 | End: 2020-01-02 | Stop reason: HOSPADM

## 2019-12-10 RX ORDER — PRAVASTATIN SODIUM 80 MG/1
80 TABLET ORAL DAILY
Status: DISCONTINUED | OUTPATIENT
Start: 2019-12-10 | End: 2020-01-02 | Stop reason: HOSPADM

## 2019-12-10 RX ORDER — ASPIRIN 81 MG/1
81 TABLET, CHEWABLE ORAL DAILY
Status: DISCONTINUED | OUTPATIENT
Start: 2019-12-10 | End: 2020-01-02 | Stop reason: HOSPADM

## 2019-12-10 RX ORDER — BUSPIRONE HYDROCHLORIDE 5 MG/1
5 TABLET ORAL 2 TIMES DAILY
Status: DISCONTINUED | OUTPATIENT
Start: 2019-12-10 | End: 2020-01-02 | Stop reason: HOSPADM

## 2019-12-10 RX ORDER — TACROLIMUS 1 MG/1
3 CAPSULE ORAL EVERY 12 HOURS SCHEDULED
Status: DISCONTINUED | OUTPATIENT
Start: 2019-12-10 | End: 2019-12-12

## 2019-12-10 RX ORDER — HYDRALAZINE HYDROCHLORIDE 50 MG/1
100 TABLET, FILM COATED ORAL EVERY 8 HOURS SCHEDULED
Status: DISCONTINUED | OUTPATIENT
Start: 2019-12-10 | End: 2019-12-11

## 2019-12-10 RX ORDER — HYDRALAZINE HYDROCHLORIDE 50 MG/1
100 TABLET, FILM COATED ORAL 3 TIMES DAILY
Status: DISCONTINUED | OUTPATIENT
Start: 2019-12-10 | End: 2019-12-10

## 2019-12-10 RX ORDER — INSULIN GLARGINE 100 [IU]/ML
5 INJECTION, SOLUTION SUBCUTANEOUS
Status: DISCONTINUED | OUTPATIENT
Start: 2019-12-10 | End: 2019-12-10

## 2019-12-10 RX ORDER — AMLODIPINE BESYLATE 10 MG/1
10 TABLET ORAL EVERY MORNING
Status: DISCONTINUED | OUTPATIENT
Start: 2019-12-10 | End: 2019-12-10

## 2019-12-10 RX ORDER — AMLODIPINE BESYLATE 10 MG/1
10 TABLET ORAL EVERY MORNING
Status: DISCONTINUED | OUTPATIENT
Start: 2019-12-11 | End: 2019-12-11

## 2019-12-10 RX ORDER — ONDANSETRON 2 MG/ML
4 INJECTION INTRAMUSCULAR; INTRAVENOUS EVERY 4 HOURS PRN
Status: DISCONTINUED | OUTPATIENT
Start: 2019-12-10 | End: 2020-01-02 | Stop reason: HOSPADM

## 2019-12-10 RX ORDER — DOXAZOSIN MESYLATE 4 MG/1
4 TABLET ORAL
Status: DISCONTINUED | OUTPATIENT
Start: 2019-12-10 | End: 2019-12-10

## 2019-12-10 RX ORDER — SODIUM CHLORIDE, SODIUM GLUCONATE, SODIUM ACETATE, POTASSIUM CHLORIDE, MAGNESIUM CHLORIDE, SODIUM PHOSPHATE, DIBASIC, AND POTASSIUM PHOSPHATE .53; .5; .37; .037; .03; .012; .00082 G/100ML; G/100ML; G/100ML; G/100ML; G/100ML; G/100ML; G/100ML
75 INJECTION, SOLUTION INTRAVENOUS CONTINUOUS
Status: DISCONTINUED | OUTPATIENT
Start: 2019-12-10 | End: 2019-12-12

## 2019-12-10 RX ORDER — MELATONIN
3000 DAILY
Status: DISCONTINUED | OUTPATIENT
Start: 2019-12-10 | End: 2020-01-02 | Stop reason: HOSPADM

## 2019-12-10 RX ORDER — SACCHAROMYCES BOULARDII 250 MG
250 CAPSULE ORAL 2 TIMES DAILY
Status: DISCONTINUED | OUTPATIENT
Start: 2019-12-10 | End: 2020-01-02 | Stop reason: HOSPADM

## 2019-12-10 RX ORDER — DULOXETIN HYDROCHLORIDE 60 MG/1
60 CAPSULE, DELAYED RELEASE ORAL 2 TIMES DAILY
Status: DISCONTINUED | OUTPATIENT
Start: 2019-12-10 | End: 2020-01-02 | Stop reason: HOSPADM

## 2019-12-10 RX ORDER — FOLIC ACID 1 MG/1
1000 TABLET ORAL DAILY
Status: DISCONTINUED | OUTPATIENT
Start: 2019-12-10 | End: 2020-01-02 | Stop reason: HOSPADM

## 2019-12-10 RX ORDER — HEPARIN SODIUM 5000 [USP'U]/ML
5000 INJECTION, SOLUTION INTRAVENOUS; SUBCUTANEOUS EVERY 8 HOURS SCHEDULED
Status: DISCONTINUED | OUTPATIENT
Start: 2019-12-10 | End: 2019-12-27

## 2019-12-10 RX ORDER — ARIPIPRAZOLE 10 MG/1
30 TABLET ORAL
Status: DISCONTINUED | OUTPATIENT
Start: 2019-12-10 | End: 2020-01-02 | Stop reason: HOSPADM

## 2019-12-10 RX ORDER — POTASSIUM CHLORIDE 20 MEQ/1
20 TABLET, EXTENDED RELEASE ORAL ONCE
Status: COMPLETED | OUTPATIENT
Start: 2019-12-10 | End: 2019-12-10

## 2019-12-10 RX ORDER — LEVOTHYROXINE SODIUM 0.12 MG/1
125 TABLET ORAL
Status: DISCONTINUED | OUTPATIENT
Start: 2019-12-10 | End: 2020-01-02 | Stop reason: HOSPADM

## 2019-12-10 RX ORDER — CLONIDINE HYDROCHLORIDE 0.1 MG/1
0.3 TABLET ORAL EVERY 8 HOURS SCHEDULED
Status: DISCONTINUED | OUTPATIENT
Start: 2019-12-10 | End: 2019-12-11

## 2019-12-10 RX ORDER — DOXAZOSIN MESYLATE 4 MG/1
4 TABLET ORAL
Status: DISCONTINUED | OUTPATIENT
Start: 2019-12-10 | End: 2019-12-11

## 2019-12-10 RX ORDER — ROPINIROLE 0.25 MG/1
0.25 TABLET, FILM COATED ORAL 2 TIMES DAILY
Status: DISCONTINUED | OUTPATIENT
Start: 2019-12-10 | End: 2020-01-02 | Stop reason: HOSPADM

## 2019-12-10 RX ORDER — SEVELAMER HYDROCHLORIDE 800 MG/1
800 TABLET, FILM COATED ORAL
Status: DISCONTINUED | OUTPATIENT
Start: 2019-12-10 | End: 2019-12-26

## 2019-12-10 RX ORDER — SODIUM BICARBONATE 650 MG/1
1300 TABLET ORAL 3 TIMES DAILY
Status: DISCONTINUED | OUTPATIENT
Start: 2019-12-10 | End: 2019-12-12

## 2019-12-10 RX ADMIN — INSULIN GLARGINE 5 UNITS: 100 INJECTION, SOLUTION SUBCUTANEOUS at 04:40

## 2019-12-10 RX ADMIN — LEVOTHYROXINE SODIUM 125 MCG: 125 TABLET ORAL at 05:34

## 2019-12-10 RX ADMIN — HEPARIN SODIUM 5000 UNITS: 5000 INJECTION INTRAVENOUS; SUBCUTANEOUS at 21:31

## 2019-12-10 RX ADMIN — VANCOMYCIN HYDROCHLORIDE 125 MG: 5 INJECTION, POWDER, LYOPHILIZED, FOR SOLUTION INTRAVENOUS at 17:47

## 2019-12-10 RX ADMIN — BUSPIRONE HYDROCHLORIDE 5 MG: 5 TABLET ORAL at 10:55

## 2019-12-10 RX ADMIN — ASPIRIN 81 MG 81 MG: 81 TABLET ORAL at 10:55

## 2019-12-10 RX ADMIN — DULOXETINE HYDROCHLORIDE 60 MG: 60 CAPSULE, DELAYED RELEASE ORAL at 17:13

## 2019-12-10 RX ADMIN — CEFEPIME HYDROCHLORIDE 2000 MG: 2 INJECTION, POWDER, FOR SOLUTION INTRAVENOUS at 04:23

## 2019-12-10 RX ADMIN — SODIUM CHLORIDE, SODIUM GLUCONATE, SODIUM ACETATE, POTASSIUM CHLORIDE, MAGNESIUM CHLORIDE, SODIUM PHOSPHATE, DIBASIC, AND POTASSIUM PHOSPHATE 75 ML/HR: .53; .5; .37; .037; .03; .012; .00082 INJECTION, SOLUTION INTRAVENOUS at 13:01

## 2019-12-10 RX ADMIN — ROPINIROLE 0.25 MG: 0.25 TABLET, FILM COATED ORAL at 10:54

## 2019-12-10 RX ADMIN — PRAVASTATIN SODIUM 80 MG: 80 TABLET ORAL at 10:54

## 2019-12-10 RX ADMIN — FERROUS SULFATE TAB 325 MG (65 MG ELEMENTAL FE) 325 MG: 325 (65 FE) TAB at 08:01

## 2019-12-10 RX ADMIN — SODIUM BICARBONATE 650 MG TABLET 1300 MG: at 10:54

## 2019-12-10 RX ADMIN — TACROLIMUS 3 MG: 1 CAPSULE ORAL at 14:06

## 2019-12-10 RX ADMIN — SODIUM BICARBONATE 650 MG TABLET 1300 MG: at 17:12

## 2019-12-10 RX ADMIN — CLONIDINE HYDROCHLORIDE 0.3 MG: 0.1 TABLET ORAL at 14:07

## 2019-12-10 RX ADMIN — Medication 250 MG: at 17:13

## 2019-12-10 RX ADMIN — METOPROLOL TARTRATE 50 MG: 50 TABLET, FILM COATED ORAL at 10:55

## 2019-12-10 RX ADMIN — SODIUM BICARBONATE 60 ML/HR: 84 INJECTION, SOLUTION INTRAVENOUS at 05:35

## 2019-12-10 RX ADMIN — BUSPIRONE HYDROCHLORIDE 5 MG: 5 TABLET ORAL at 17:13

## 2019-12-10 RX ADMIN — AMLODIPINE BESYLATE 10 MG: 10 TABLET ORAL at 10:54

## 2019-12-10 RX ADMIN — ROPINIROLE 0.25 MG: 0.25 TABLET, FILM COATED ORAL at 17:13

## 2019-12-10 RX ADMIN — CLONIDINE HYDROCHLORIDE 0.3 MG: 0.1 TABLET ORAL at 05:34

## 2019-12-10 RX ADMIN — VANCOMYCIN HYDROCHLORIDE 125 MG: 5 INJECTION, POWDER, LYOPHILIZED, FOR SOLUTION INTRAVENOUS at 23:41

## 2019-12-10 RX ADMIN — DULOXETINE HYDROCHLORIDE 60 MG: 60 CAPSULE, DELAYED RELEASE ORAL at 10:54

## 2019-12-10 RX ADMIN — ARIPIPRAZOLE 30 MG: 10 TABLET ORAL at 21:30

## 2019-12-10 RX ADMIN — ONDANSETRON 4 MG: 2 INJECTION INTRAMUSCULAR; INTRAVENOUS at 15:29

## 2019-12-10 RX ADMIN — FOLIC ACID 1000 MCG: 1 TABLET ORAL at 10:54

## 2019-12-10 RX ADMIN — PREDNISONE 5 MG: 5 TABLET ORAL at 10:53

## 2019-12-10 RX ADMIN — SERTRALINE HYDROCHLORIDE 200 MG: 100 TABLET ORAL at 10:53

## 2019-12-10 RX ADMIN — POTASSIUM CHLORIDE 20 MEQ: 1500 TABLET, EXTENDED RELEASE ORAL at 12:41

## 2019-12-10 RX ADMIN — METOPROLOL TARTRATE 50 MG: 50 TABLET, FILM COATED ORAL at 22:00

## 2019-12-10 RX ADMIN — HEPARIN SODIUM 5000 UNITS: 5000 INJECTION INTRAVENOUS; SUBCUTANEOUS at 05:35

## 2019-12-10 RX ADMIN — SODIUM BICARBONATE 650 MG TABLET 1300 MG: at 21:29

## 2019-12-10 RX ADMIN — TACROLIMUS 3 MG: 1 CAPSULE ORAL at 21:32

## 2019-12-10 RX ADMIN — MELATONIN 3000 UNITS: at 10:54

## 2019-12-10 RX ADMIN — SEVELAMER HYDROCHLORIDE 800 MG: 800 TABLET, FILM COATED PARENTERAL at 08:01

## 2019-12-10 RX ADMIN — Medication 250 MG: at 10:53

## 2019-12-10 RX ADMIN — HEPARIN SODIUM 5000 UNITS: 5000 INJECTION INTRAVENOUS; SUBCUTANEOUS at 14:07

## 2019-12-10 RX ADMIN — CLONIDINE HYDROCHLORIDE 0.3 MG: 0.1 TABLET ORAL at 22:08

## 2019-12-10 RX ADMIN — HYDRALAZINE HYDROCHLORIDE 100 MG: 50 TABLET, FILM COATED ORAL at 10:53

## 2019-12-10 RX ADMIN — HYDRALAZINE HYDROCHLORIDE 100 MG: 50 TABLET, FILM COATED ORAL at 23:36

## 2019-12-10 NOTE — ASSESSMENT & PLAN NOTE
· Significantly elevated on admission, suspect noncompliance and abdominal pain playing role in this  · Continue home antihypertensive regimen, blood pressure monitoring per protocol

## 2019-12-10 NOTE — TELEPHONE ENCOUNTER
Raymundo Henderson from [facility] call about patients Revlimid 5mg, taken every other day for 21 days, then off three weeks  Last time it was every other day for 21 says and off 1 week  Please confirm that patient is off for three weeks   Call Diplomat at [phone]

## 2019-12-10 NOTE — QUICK NOTE
Post admit check:    Chart reviewed, d/w RN and patient  Pt was awake, alert and oriented x3 in the room Pt reports she is still having lower quadrant abdominal pain cramping in nature  +frequent loose mucousy/watery stools which has persisted since she was last admitted to the hospital per patient  + nausea and not eating d/t nausea and vomiting earlier today  ID saw the patient and started her on oral vancomycin to treat for possible cdiff  Pending stool cultures  C/w supportive care for n/v/d  Cefepime was discontinued for asymptomatic bacteruria, will observe off abxs     Hypoglycemia in the setting of type 1 diabetes- received lantus this am at 4am  Hold lantus and c/w ssi and accuchecks q4h  Encourage PO intake  Per nephrology: If BS decrease further tonight, could change fluids to D5W with 1/2 NS with 75 meq bicarb  Pharmacy will need to be directly called to help build the fluids as cannot order directly  Will need to check potassium q 8 hours if doing this change given hypokalemia earlier      Nephrology following in the setting of CKD4 and kidney/pancreas transplant in 1998, IVFs changed to isolyte from bicarb gtt

## 2019-12-10 NOTE — H&P
H&P- Crispin Watson 1965, 53 y o  female MRN: [id number]    Unit/Bed#: ED 08 Encounter: [id number]    Primary Care Provider: Francisco Balderas MD   Date and time admitted to hospital: 12/9/2019  8:29 PM        * Abdominal pain, possible UTI (urinary tract infection)  Assessment & Plan  · With history of recurrent UTIs in setting of renal and pancreatic transplant  Most recently with VRE at recent admission to [facility]  · Presenting now with abdominal pain/nausea and lethargy/confusion; does deny maya dysuria but seems confused on examination  · UA with innumerable WBCs, occasional bacteria  · Possibly from UTI versus colitis from diarrhea as per CT abdomen/pelvis  · To get dose of cefepime, will ask for ID recommendations for need for further antibiotic therapy  · Trend WBC, temperature curve  · Follow up results of blood cultures x2, urine culture    Metabolic acidosis  Assessment & Plan  · Appears acute on chronic  · S/P kidney and pancreatic transplant in 1998  · On IV bicarbonate, continue p o  and trend BMP  · Request Nephrology consultation    Diarrhea  Assessment & Plan  · Persistent since last admission; C diff during at recent admission at [facility] was negative  · CT abdomen/pelvis with evidence of colitis of proximal to mid sigmoid and distal left colon  · Given frequent hospitalizations and antibiotic use will recheck C diff    Additionally check fecal WBCs and stool culture    Multiple myeloma not having achieved remission 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)  Assessment & Plan  Lab Results   Component Value Date    HGBA1C 5 1 09/09/2019       No results for input(s): POCGLU in the last 72 hours  Blood Sugar Average: Last 72 hrs:  ·  continue Lantus (in place of Toujeo) 5 units qHS  · Add SSI with accu-cheks and carb controlled diet  · Initiate hypoglycemia protocol and avoid hypoglycemia    Chronic kidney disease, stage 4 (severe) (Formerly [facility])  Assessment & Plan  · Creatinine at baseline  · Monitor BMP while inpatient  · Avoid nephrotoxins as able, renally dose medications as indicated    Renal transplant recipient  Assessment & Plan  · On chronic immunosuppression with tacrolimus and prednisone  · Unfortunately patient noncompliant with all medications since discharge including these  · Tacrolimus level is pending and will appreciate further Nephrology recommendations      VTE Prophylaxis: Heparin  / sequential compression device   Code Status: Level 1 - Full Code  POLST: POLST form is not discussed and not completed at this time  Anticipated Length of Stay:  Patient will be admitted on an Inpatient basis with an anticipated length of stay of  Greater than 2 midnights  Justification for Hospital Stay: Please see detailed plans noted above  Chief Complaint:     Abdominal pain, nausea, diarrhea, lethargy  History of Present Illness: Eric Orosco is a 53 y o  female who presents with generalized weakness abdominal pain  She has a past medical history significant for chronic kidney disease stage 4, S/P kidney and pancreatic transplant in 1998, type 1 diabetes mellitus, hypertension, metabolic acidosis, multiple myeloma, and depression  She has had several admissions over the past year for recurrent UTIs and acute kidney injury, including most recently at [facility] 11/23/2019-12/06/2019 found with VRE and completing daptomycin treatment prior to discharge; had developed diarrhea with negative C diff at that time    Unfortunately since discharge, patient's diarrhea had persisted described as multiple watery bowel movements daily, associated with lower abdominal pain and nausea  Today, patient was found by her father to be somewhat confused, naked and apparently covered in feces and urine, prompting evaluation  CT abdomen/pelvis obtained ED with evidence of sigmoid and left colonic colitis and UA with innumerable pyuria with occasional bacteriuria  Also with hypernatremia and worsening of her acidosis  During my evaluation, patient persisted with complaint of lower abdominal pain  She admits to me increased weakness and poor p o  Intake, and that secondary to this she had not taken any of her home medications        Review of Systems:    Constitutional:  Denies fever or chills but endorses fatigue and appetite change  Eyes:  Denies change in visual acuity   HENT:  Denies nasal congestion or sore throat   Respiratory:  Denies cough or shortness of breath   Cardiovascular:  Denies chest pain or edema   GI:  Denies vomiting, bloody stools but endorses abdominal pain, nausea, and diarrhea  :  Denies dysuria   Musculoskeletal:  Denies back pain or joint pain   Integument:  Denies rash   Neurologic:  Denies headache, focal weakness or sensory changes but endorses weakness  Endocrine:  Denies polyuria or polydipsia   Lymphatic:  Denies swollen glands   Psychiatric:  Denies depression or anxiety     Past Medical and Surgical History:   Past Medical History:   Diagnosis Date    Abnormal liver function test     Acute kidney injury ([address] )     Acute on chronic congestive heart failure (HCC)     Allergic urticaria     Anemia     Cancer (HCC)     Multiple myeloma    Cervical dysplasia     Cholelithiasis     Chronic diastolic (congestive) heart failure (HCC) 9/18/2017    Diabetes mellitus (HCC)     Previous, controlled with diet    Diabetes mellitus with foot ulcer ([address] )     Disease of thyroid gland     Encephalopathy     Hematuria     + leak est- secondary to UTIs/panc drainage    History of transfusion     Hyperkalemia     Hypertension     Iliotibial band syndrome     Lumbar radiculopathy     Multiple myeloma (HCC)     Multiple myeloma (HCC)     Night blindness     Nonrheumatic aortic (valve) insufficiency     Pneumonia     Renal disorder     Retinopathy     Seborrhea     [facility] [address]     Shingles     Sinus tachycardia     B blocker - cardio echo stress test 02 normal/neg LE doppler 2/02 OK and 12/07    Status post simultaneous kidney and pancreas transplant ([facility] [facility] )     Toe amputation status     Trochanteric bursitis      Past Surgical History:   Procedure Laterality Date    CATARACT EXTRACTION      CHOLECYSTECTOMY      COLONOSCOPY      two polyps in the rectum removed and biopsied diverticulosis in the sigmoid colon, external hemorrhoiods- Dr Barfield    COMBINED KIDNEY-PANCREAS TRANSPLANT N/A     CT BONE MARROW BIOPSY AND ASPIRATION  4/17/2019    CYSTOSCOPY N/A 10/13/2016    Procedure: CYSTOSCOPY, retrograde pyelogram, biopsy of ureteral polyp; Surgeon: Mias Moseley MD;  Location: [facility];  Service:    Aydee Marcial DILATION AND CURETTAGE OF UTERUS      ESOPHAGOGASTRODUODENOSCOPY N/A 11/20/2017    Procedure: ESOPHAGOGASTRODUODENOSCOPY (EGD); Surgeon: Gonzalo Carvajal DO;  Location: [facility];   Service: Gastroenterology    EYE SURGERY      cataracts    FOOT AMPUTATION THROUGH METATARSAL Left     FOOT SURGERY Right     excision of metatarsal heads    HALLUX VALGUS CORRECTION Right     NEPHRECTOMY TRANSPLANTED ORGAN      PANCREATIC TRANSPLANT REMOVAL  1998       Meds/Allergies:    Current Facility-Administered Medications:     amLODIPine (NORVASC) tablet 10 mg, 10 mg, Oral, QAM, Dony Suarez DO    ARIPiprazole (ABILIFY) tablet 30 mg, 30 mg, Oral, HS, Dony Suarez DO    aspirin chewable tablet 81 mg, 81 mg, Oral, Daily, Dony Suarez DO    busPIRone (BUSPAR) tablet 5 mg, 5 mg, Oral, BID, Dony Suarez DO    cefepime (MAXIPIME) 2 g/50 mL dextrose IVPB, 2,000 mg, Intravenous, Q24H, Stevenson Garduno DO    cholecalciferol (VITAMIN D3) tablet 3,000 Units, 3,000 Units, Oral, Daily, Stevenson Garduno DO    cloNIDine (CATAPRES) tablet 0 3 mg, 0 3 mg, Oral, Q8H [address], Stevenson Garduno DO    doxazosin (CARDURA) tablet 4 mg, 4 mg, Oral, HS, Stevenson Garduno DO    DULoxetine (CYMBALTA) delayed release capsule 60 mg, 60 mg, Oral, BID, Stevenson Garduno DO    ferrous sulfate tablet 325 mg, 325 mg, Oral, Daily With Breakfast, Stevenson Garduno DO    folic acid (FOLVITE) tablet 1,000 mcg, 1,000 mcg, Oral, Daily, Stevenson Garduno DO    heparin (porcine) subcutaneous injection 5,000 Units, 5,000 Units, Subcutaneous, Q8H [address] **AND** Platelet count, , , Once, Stevenson Garduno DO    hydrALAZINE (APRESOLINE) tablet 100 mg, 100 mg, Oral, TID, Stevenson Garduno DO    insulin glargine (LANTUS) subcutaneous injection 5 Units 0 05 mL, 5 Units, Subcutaneous, HS, Stevenson Garduno DO    insulin lispro (HumaLOG) 100 units/mL subcutaneous injection 1-5 Units, 1-5 Units, Subcutaneous, HS, Stevenson Garduno,     insulin lispro (HumaLOG) 100 units/mL subcutaneous injection 1-6 Units, 1-6 Units, Subcutaneous, TID AC **AND** Fingerstick Glucose (POCT), , , TID AC, Stevenson Garduno DO    levothyroxine tablet 125 mcg, 125 mcg, Oral, Early Morning, Stevenson Garduno DO    LORazepam (ATIVAN) tablet 2 mg, 2 mg, Oral, TID PRN, Stevenson Garduno DO    metoprolol tartrate (LOPRESSOR) tablet 50 mg, 50 mg, Oral, Q12H [address], Stevenson Garduno DO    pravastatin (PRAVACHOL) tablet 80 mg, 80 mg, Oral, Daily, Stevenson Garduno DO    predniSONE tablet 5 mg, 5 mg, Oral, Daily, Stevenson Garduno DO    rOPINIRole (REQUIP) tablet 0 25 mg, 0 25 mg, Oral, BID, Stevenson Garduno DO    saccharomyces boulardii (FLORASTOR) capsule 250 mg, 250 mg, Oral, BID, Stevenson Garduno DO    sertraline (ZOLOFT) tablet 200 mg, 200 mg, Oral, Daily, Stevenson Garduno DO    sevelamer (RENAGEL) tablet 800 mg, 800 mg, Oral, TID With Meals, Stevenson Garduno DO    sodium bicarbonate 150 mEq in dextrose 5 % 1,000 mL infusion, 60 mL/hr, Intravenous, Continuous, Dony Suarez DO    sodium bicarbonate tablet 1,300 mg, 1,300 mg, Oral, TID, Dony Suarez DO    tacrolimus (PROGRAF) capsule 3 mg, 3 mg, Oral, Q12H [address], Dony Suarez,     Current Outpatient Medications:     amLODIPine (NORVASC) 10 mg tablet, TAKE 1 TABLET(10MG) BY MOUTH EVERY MORNING AND 1/2 TABLET(5MG) EVERY EVENING (Patient taking differently: Take 10 mg by mouth every morning TAKE 1 TABLET(10MG) BY MOUTH EVERY MORNING AND 1/2 TABLET(5MG) EVERY EVENING), Disp: 135 tablet, Rfl: 0    aspirin 81 MG tablet, Take 1 tablet by mouth daily, Disp: , Rfl:     Cholecalciferol (VITAMIN D3) 1000 units CAPS, Take 3 capsules by mouth daily  , Disp: , Rfl:     cloNIDine (CATAPRES) 0 1 mg tablet, TAKE 3 TABLETS BY MOUTH EVERY MORNING, 3 TABLETS AT NOON, AND 3 TABLETS EVERY EVENING (Patient taking differently: Take 0 3 mg by mouth every 8 (eight) hours TAKE 3 TABLETS BY MOUTH EVERY MORNING, 3 TABLETS AT NOON, AND 3 TABLETS EVERY EVENING), Disp: 810 tablet, Rfl: 4    doxazosin (CARDURA) 4 mg tablet, Take 1 tablet (4 mg total) by mouth daily at bedtime, Disp: 30 tablet, Rfl: 0    ferrous sulfate 325 (65 Fe) mg tablet, Take 1 tablet (325 mg total) by mouth daily with breakfast, Disp: 30 tablet, Rfl: 0    folic acid (FOLVITE) 1 mg tablet, TAKE 1 TABLET BY MOUTH DAILY AS DIRECTED, Disp: 90 tablet, Rfl: 3    hydrALAZINE (APRESOLINE) 100 MG tablet, Take 1 tablet (100 mg total) by mouth 3 (three) times a day, Disp: 90 tablet, Rfl: 0    Insulin Glargine (TOUJEO) 300 units/mL CONCETRATED U-300 injection pen, Inject 5 Units under the skin daily at bedtime, Disp: 4 pen, Rfl: 0    Insulin Pen Needle (BD PEN NEEDLE VISHNU U/F) 32G X 4 MM MISC, Use 4 times daily, Disp: 400 each, Rfl: 1    Lancets (ONETOUCH ULTRASOFT) lancets, by Does not apply route 4 (four) times a day  , Disp: , Rfl:     lenalidomide (REVLIMID) 5 MG CAPS, Take 5 mg every other day for 3 weeks on and 3 weeks off [facility] [place]# [id number] 12/9/19, Disp: 11 capsule, Rfl: 0    levothyroxine 125 mcg tablet, Take 1 tablet (125 mcg total) by mouth daily, Disp: 90 tablet, Rfl: 2    loperamide (IMODIUM) 2 mg capsule, Take 1 capsule (2 mg total) by mouth 4 (four) times a day as needed for diarrhea, Disp: , Rfl: 0    LORazepam (ATIVAN) 2 mg tablet, Take 1 tablet (2 mg total) by mouth 3 (three) times a day as needed for anxiety, Disp: 90 tablet, Rfl: 3    metoprolol tartrate (LOPRESSOR) 50 mg tablet, TAKE 1 TABLET(50 MG TOTAL) BY MOUTH TWICE DAILY, Disp: 180 tablet, Rfl: 0    pravastatin (PRAVACHOL) 80 mg tablet, TAKE 1 TABLET BY MOUTH DAILY, Disp: 90 tablet, Rfl: 5    predniSONE 5 mg tablet, Take 1 tablet (5 mg total) by mouth daily, Disp: 90 tablet, Rfl: 3    rOPINIRole (REQUIP) 0 25 mg tablet, TAKE 1 TABLET BY MOUTH TWICE DAILY, Disp: 180 tablet, Rfl: 1    saccharomyces boulardii (FLORASTOR) 250 mg capsule, Take 1 capsule (250 mg total) by mouth 2 (two) times a day, Disp: 20 each, Rfl: 0    sevelamer carbonate (RENVELA) 800 mg tablet, Take 1 tablet (800 mg total) by mouth 3 (three) times a day with meals, Disp: 270 tablet, Rfl: 3    sodium bicarbonate 650 mg tablet, TAKE 2 TABLETS BY MOUTH THREE TIMES DAILY, Disp: 180 tablet, Rfl: 0    tacrolimus (PROGRAF) 1 mg capsule, Take 3 mg in AM and 2 mg in PM (6/18/19) (Patient taking differently: Take 3 mg by mouth every 12 (twelve) hours ), Disp: 180 capsule, Rfl: 6    ARIPiprazole (ABILIFY) 30 mg tablet, Take 1 tablet (30 mg total) by mouth daily at bedtime for 180 days, Disp: 90 tablet, Rfl: 1    busPIRone (BUSPAR) 5 mg tablet, Take 1 tablet (5 mg total) by mouth 2 (two) times a day for 180 days, Disp: 180 tablet, Rfl: 1    DULoxetine (CYMBALTA) 60 mg delayed release capsule, Take 1 capsule (60 mg total) by mouth 2 (two) times a day for 180 days, Disp: 180 capsule, Rfl: 1    sertraline (ZOLOFT) 100 mg tablet, Take 2 tablets (200 mg total) by mouth daily for 180 days, Disp: 180 tablet, Rfl: 1    Allergies: Allergies   Allergen Reactions    Ixazomib Rash     Ninlaro    Revlimid [Lenalidomide] Rash    Cefadroxil     Latex Rash    Morphine Other (See Comments)     Other reaction(s): Other (See Comments)  projectile vomiting    Morphine And Related GI Intolerance    Myrbetriq [Mirabegron] Hives    Penicillins Hives     Other reaction(s): Other (See Comments)  Respiratory Distress,hives  Tolerates cefazolin     History:  Marital Status: Legally      Substance Use History:   Social History     Substance and Sexual Activity   Alcohol Use Never    Frequency: Never    Binge frequency: Never    Comment: (history)     Social History     Tobacco Use   Smoking Status Former Smoker    Last attempt to quit: 2012    Years since quittin 6   Smokeless Tobacco Never Used     Social History     Substance and Sexual Activity   Drug Use Never    Types: Hydrocodone    Comment: Past cocaine use many years ago - no current use       Family History:  Family History   Problem Relation Age of Onset    Hypertension Mother     Cancer Mother     Hypertension Father     Cancer Father     Cancer Maternal Grandfather     Cancer Paternal Grandmother     Cancer Paternal Grandfather     Depression Sister     Breast cancer Maternal Grandmother 77       Physical Exam:     Vitals:   Blood Pressure: (!) 198/73 (12/10/19 0324)  Pulse: 105 (12/10/19 0324)  Temperature: 98 4 °F (36 9 °C) (19)  Temp Source: Oral (19)  Respirations: 18 (12/10/19 0324)  Height: 5' 8" (172 7 cm) (19)  Weight - Scale: 107 kg (235 lb 14 3 oz) (19)  SpO2: 97 % (12/10/19 0324)    Constitutional:  Chronically ill appearing, disheveled, obese, no acute distress, non-toxic appearance   Eyes:  PERRL, conjunctiva normal   HENT:  Atraumatic, external ears normal, nose normal, oropharynx moist, no pharyngeal exudates   Neck- normal range of motion, no tenderness, supple   Respiratory:  No respiratory distress, normal breath sounds, no rales, no wheezing   Cardiovascular:  Normal rate, normal rhythm, no murmurs, no gallops, no rubs   GI:  Soft, nondistended, normal bowel sounds, mild discomfort to palpation in suprapubic area, no organomegaly, no mass, no rebound, no guarding   :  No costovertebral angle tenderness   Musculoskeletal:  No edema, no tenderness, no deformities  Back- no tenderness  Integument:  Well hydrated, no rash   Lymphatic:  No lymphadenopathy noted   Neurologic:  Alert &awake but slow to respond, communicative, CN 2-12 normal, normal motor function, normal sensory function, no focal deficits noted   Psychiatric:  Speech and behavior appropriate       Lab Results: I have personally reviewed pertinent reports  Results from last 7 days   Lab Units 12/09/19 2023 12/03/19  0611   WBC Thousand/uL 9 58   < > 5 96   HEMOGLOBIN g/dL 9 9*   < > 7 2*   HEMATOCRIT % 30 8*   < > 24 0*   PLATELETS Thousands/uL 223   < > 136*   NEUTROS PCT %  --   --  62   LYMPHS PCT %  --   --  18   LYMPHO PCT % 4*  --   --    MONOS PCT %  --   --  16*   MONO PCT % 2*  --   --    EOS PCT % 0  --  1    < > = values in this interval not displayed  Results from last 7 days   Lab Units 12/09/19 2023   POTASSIUM mmol/L 3 5   CHLORIDE mmol/L 123*   CO2 mmol/L 14*   BUN mg/dL 46*   CREATININE mg/dL 2 36*   CALCIUM mg/dL 8 7   ALK PHOS U/L 106   ALT U/L 17   AST U/L 19     Results from last 7 days   Lab Units 12/09/19 2023   INR  1 21*       EKG:  Sinus tachycardia, LAD, prolonged QTC 530ms    Imaging: I have personally reviewed pertinent reports  Ct Abdomen Pelvis Wo Contrast    Result Date: 12/10/2019  Narrative: CT ABDOMEN AND PELVIS WITHOUT IV CONTRAST INDICATION:   Abdominal pain, acute, nonlocalized  Lower abdominal pain, nausea, diarrhea  Patient recently diagnosed with urinary tract infection  Prior history of kidney transplant    Prior history of pancreas transplant  History of diabetes, hypertension, multiple myeloma  COMPARISON:  December 2, 2019  TECHNIQUE:  CT examination of the abdomen and pelvis was performed without intravenous contrast   Axial, sagittal, and coronal 2D reformatted images were created from the source data and submitted for interpretation  Radiation dose length product (DLP) for this visit:  1494 63 mGy-cm   This examination, like all CT scans performed in the [place], was performed utilizing techniques to minimize radiation dose exposure, including the use of iterative reconstruction and automated exposure control  Enteric contrast was administered  FINDINGS: ABDOMEN LOWER CHEST:  There are small bilateral pleural effusions, larger compared to the prior study  There is a pericardial effusion, measuring up to 12 mm posteriorly; this appears similar to the prior study  LIVER/BILIARY TREE:  Small calcifications within the inferomedial aspect of the right lobe of the liver appears similar to the prior study  GALLBLADDER:  Gallbladder is surgically absent  SPLEEN:  Unremarkable  PANCREAS:  The pancreas is atrophic, similar to the prior study  ADRENAL GLANDS:  Unremarkable  KIDNEYS/URETERS:  The native kidneys are markedly atrophic bilaterally  There is a 1 7 cm cyst arising from the lower pole right kidney, unchanged compared to the prior study  A transplant kidney is present in the left iliac fossa nonspecific perinephric stranding is again evident  There is no hydronephrosis  STOMACH AND BOWEL:  There is no evidence of bowel obstruction  Bowel wall thickening of the proximal to mid sigmoid and distal left colon is again evident  This could be due to colitis  Clinical and laboratory correlation is recommended  APPENDIX:  No findings to suggest appendicitis  ABDOMINOPELVIC CAVITY:  There is a small amount of ascites, increased slightly compared to the prior study  There is no pneumoperitoneum   VESSELS:  There is extensive atherosclerosis  There is no abdominal aortic aneurysm  PELVIS REPRODUCTIVE ORGANS:  Unremarkable for patient's age  URINARY BLADDER:  A urinary bladder diverticulum containing calculi and possibly surgical sutures is again evident, similar to the prior study  As previously stated, this may be related to history of prior pancreas transplant  ABDOMINAL WALL/INGUINAL REGIONS:  There is some infiltration of the subcutaneous fat suggesting a degree of anasarca  OSSEOUS STRUCTURES:  No acute fracture or destructive osseous lesion  Impression: There is bowel wall thickening involving the proximal to mid sigmoid and the distal left colon  This may be due to colitis  Clinical and laboratory correlation is recommended  Follow-up after treatment is recommended  There is a small amount of ascites, increased slightly compared to the prior study  There are small bilateral pleural effusions, larger compared to the prior study  There is a pericardial effusion, this appears similar to the prior study  There is some infiltration of the subcutaneous fat suggesting a degree of anasarca  The native kidneys are markedly atrophic bilaterally  A small right renal cyst appears unchanged  A transplant kidney is again evident within the left iliac fossa  There is some nonspecific perinephric stranding adjacent to the transplant kidney, similar to the previous examination  There is no hydronephrosis  A urinary bladder diverticulum containing calculi and possibly surgical sutures appears similar to the prior study  Workstation performed: [id number]     Ct Abdomen Pelvis Wo Contrast    Result Date: 12/2/2019  Narrative: CT ABDOMEN AND PELVIS WITHOUT IV CONTRAST INDICATION:   Transplanted kidney-with diarrhea and abdominal pain   COMPARISON:  CT 11/6/2019 TECHNIQUE:  CT examination of the abdomen and pelvis was performed without intravenous contrast   Axial, sagittal, and coronal 2D reformatted images were created from the source data and submitted for interpretation  Radiation dose length product (DLP) for this visit:  1038 mGy-cm   This examination, like all CT scans performed in the [facility], was performed utilizing techniques to minimize radiation dose exposure, including the use of iterative reconstruction and automated exposure control  Enteric contrast was not administered  FINDINGS: ABDOMEN LOWER CHEST: New small bilateral pleural effusions  Unchanged small pericardial effusion  LIVER/BILIARY TREE:  Unremarkable  GALLBLADDER:  Surgically absent SPLEEN:  Unremarkable  PANCREAS: Severely atrophic  No main ductal dilation  Atrophic right lower quadrant pancreatic transplant with bladder exocrine drainage  ADRENAL GLANDS:  Unremarkable  KIDNEYS/URETERS:  Markedly atrophic nonhydronephrotic native kidneys  Again seen is a 1 5 cm cystic lesion at the right renal lower pole, incompletely characterized  Left lower quadrant kidney transplant without hydronephrosis, noting unchanged mild perinephric stranding  STOMACH AND BOWEL: Circumferential mural thickening of long segment of the sigmoid colon suspicious for colitis (series 2, image 67)  APPENDIX:  No findings to suggest appendicitis  ABDOMINOPELVIC CAVITY:  Small volume of ascites, new since prior exam  VESSELS:  Unremarkable for patient's age  PELVIS REPRODUCTIVE ORGANS: Punctate focus of air within the upper endometrial cavity (series 602, image 81), nonspecific and new since the prior exam   No adnexal mass  URINARY BLADDER:  Unremarkable  ABDOMINAL WALL/INGUINAL REGIONS:  Unremarkable  OSSEOUS STRUCTURES:  No acute fracture or destructive osseous lesion  Impression: 1  Circumferential mural thickening of the sigmoid colon, new since the prior exam and suspicious for colitis  2   Unchanged mild fat stranding around the transplant kidney, nonspecific  No hydronephrosis   3   Small volume of ascites and small bilateral pleural effusions, new since the prior exam  4   Atrophic right lower quadrant with exocrine drainage to the urinary bladder  5  Punctate focus of air within the upper endometrial cavity (series 602, image 81), nonspecific and new since the prior exam   Correlate with pelvic exam findings  6   Right native kidney lower pole 1 5 cm lesion, incompletely characterized but increased in size compared to 11/16/2017  Recommend further evaluation with ultrasound on nonemergent basis  The study was marked in [facility] for immediate notification  Workstation performed: [id number]         ** Please Note: Dragon 360 Dictation voice to text software was used in the creation of this document   **

## 2019-12-10 NOTE — ASSESSMENT & PLAN NOTE
· Appears acute on chronic  · S/P kidney and pancreatic transplant in 1998  · On IV bicarbonate, continue p o  and trend BMP  · Request Nephrology consultation

## 2019-12-10 NOTE — ED PROVIDER NOTES
History  Chief Complaint   Patient presents with    Abdominal Pain     Pt reports she was at [facility] on friday and was diagnosed with a UTI  Went home and has had abdominal pain, nasuea and diarrhea  Reports lower abdominal pain upon arrival       69-year-old female with history of multiple myeloma, diabetes, CKD status post renal and pancreas transplant on immunosuppression presents to the emergency department for evaluation of abdominal pain  The patient has a history of recurrent UTIs and was recently discharged from [facility] on Friday where it and she was found to be VRE positive  She was treated with a 7 day course of daptomycin which was complicated with diarrhea  Stool was C diff negative at that time  The patient says she felt generally well when she was discharged on Friday but over the weekend her diarrhea continued and she developed 10/10 sharp lower abdominal pain  The patient says she has had similar abdominal pain in the past and was overuse related to when she had bladder infections  The patient did not take anything at home to treat her pain and she reports no aggravating or alleviating factors  The patient is accompanied by her father who reports that he found his daughter at home today she was nude and covered in her own feces and urine  He says that she has not been eating well or drinking enough fluids and has not been compliant with her medication  The patient denies fever, chills, chest pain, shortness of breath and urinary symptoms  Prior to Admission Medications   Prescriptions Last Dose Informant Patient Reported? Taking?    ARIPiprazole (ABILIFY) 30 mg tablet  Self No No   Sig: Take 1 tablet (30 mg total) by mouth daily at bedtime for 180 days   Cholecalciferol (VITAMIN D3) 1000 units CAPS  Self Yes Yes   Sig: Take 3 capsules by mouth daily     DULoxetine (CYMBALTA) 60 mg delayed release capsule  Self No No   Sig: Take 1 capsule (60 mg total) by mouth 2 (two) times a day for 180 days   Insulin Glargine (TOUJEO) 300 units/mL CONCETRATED U-300 injection pen   No Yes   Sig: Inject 5 Units under the skin daily at bedtime   Insulin Pen Needle (BD PEN NEEDLE VISHNU U/F) 32G X 4 MM MISC  Self No Yes   Sig: Use 4 times daily   LORazepam (ATIVAN) 2 mg tablet   No Yes   Sig: Take 1 tablet (2 mg total) by mouth 3 (three) times a day as needed for anxiety   Lancets (ONETOUCH ULTRASOFT) lancets  Self Yes Yes   Sig: by Does not apply route 4 (four) times a day     amLODIPine (NORVASC) 10 mg tablet  Self No Yes   Sig: TAKE 1 TABLET(10MG) BY MOUTH EVERY MORNING AND 1/2 TABLET(5MG) EVERY EVENING   Patient taking differently: Take 10 mg by mouth every morning TAKE 1 TABLET(10MG) BY MOUTH EVERY MORNING AND 1/2 TABLET(5MG) EVERY EVENING   aspirin 81 MG tablet  Self Yes Yes   Sig: Take 1 tablet by mouth daily   busPIRone (BUSPAR) 5 mg tablet  Self No No   Sig: Take 1 tablet (5 mg total) by mouth 2 (two) times a day for 180 days   cloNIDine (CATAPRES) 0 1 mg tablet  Self No Yes   Sig: TAKE 3 TABLETS BY MOUTH EVERY MORNING, 3 TABLETS AT NOON, AND 3 TABLETS EVERY EVENING   Patient taking differently: Take 0 3 mg by mouth every 8 (eight) hours TAKE 3 TABLETS BY MOUTH EVERY MORNING, 3 TABLETS AT NOON, AND 3 TABLETS EVERY EVENING   doxazosin (CARDURA) 4 mg tablet  Self No Yes   Sig: Take 1 tablet (4 mg total) by mouth daily at bedtime   ferrous sulfate 325 (65 Fe) mg tablet  Self No Yes   Sig: Take 1 tablet (325 mg total) by mouth daily with breakfast   folic acid (FOLVITE) 1 mg tablet  Self No Yes   Sig: TAKE 1 TABLET BY MOUTH DAILY AS DIRECTED   hydrALAZINE (APRESOLINE) 100 MG tablet   No Yes   Sig: Take 1 tablet (100 mg total) by mouth 3 (three) times a day   lenalidomide (REVLIMID) 5 MG CAPS   No Yes   Sig: Take 5 mg every other day for 3 weeks on and 3 weeks off [facility]# [id number] 12/9/19   levothyroxine 125 mcg tablet  Self No Yes   Sig: Take 1 tablet (125 mcg total) by mouth daily   loperamide (IMODIUM) 2 mg capsule   No Yes   Sig: Take 1 capsule (2 mg total) by mouth 4 (four) times a day as needed for diarrhea   metoprolol tartrate (LOPRESSOR) 50 mg tablet  Self No Yes   Sig: TAKE 1 TABLET(50 MG TOTAL) BY MOUTH TWICE DAILY   pravastatin (PRAVACHOL) 80 mg tablet  Self No Yes   Sig: TAKE 1 TABLET BY MOUTH DAILY   predniSONE 5 mg tablet  Self No Yes   Sig: Take 1 tablet (5 mg total) by mouth daily   rOPINIRole (REQUIP) 0 25 mg tablet   No Yes   Sig: TAKE 1 TABLET BY MOUTH TWICE DAILY   saccharomyces boulardii (FLORASTOR) 250 mg capsule   No Yes   Sig: Take 1 capsule (250 mg total) by mouth 2 (two) times a day   sertraline (ZOLOFT) 100 mg tablet  Self No No   Sig: Take 2 tablets (200 mg total) by mouth daily for 180 days   sevelamer carbonate (RENVELA) 800 mg tablet  Self No Yes   Sig: Take 1 tablet (800 mg total) by mouth 3 (three) times a day with meals   sodium bicarbonate 650 mg tablet  Self No Yes   Sig: TAKE 2 TABLETS BY MOUTH THREE TIMES DAILY   tacrolimus (PROGRAF) 1 mg capsule  Self No Yes   Sig: Take 3 mg in AM and 2 mg in PM (6/18/19)   Patient taking differently: Take 3 mg by mouth every 12 (twelve) hours       Facility-Administered Medications: None       Past Medical History:   Diagnosis Date    Abnormal liver function test     Acute kidney injury ([facility] [address] )     Acute on chronic congestive heart failure (HCC)     Allergic urticaria     Anemia     Cancer (HCC)     Multiple myeloma    Cervical dysplasia     Cholelithiasis     Chronic diastolic (congestive) heart failure (HCC) 9/18/2017    Diabetes mellitus (HCC)     Previous, controlled with diet    Diabetes mellitus with foot ulcer ([address] )     Disease of thyroid gland     Encephalopathy     Hematuria     + leak est- secondary to UTIs/panc drainage    History of transfusion     Hyperkalemia     Hypertension     Iliotibial band syndrome     Lumbar radiculopathy     Multiple myeloma (HCC)     Multiple myeloma (HCC)     Night blindness     Nonrheumatic aortic (valve) insufficiency     Pneumonia     Renal disorder     Retinopathy     Seborrhea     Seizure ([facility] [address] [facility]     Shingles     Sinus tachycardia     B blocker - cardio echo stress test 02 normal/neg LE doppler  OK and     Status post simultaneous kidney and pancreas transplant ([facility] [address] )     Toe amputation status     Trochanteric bursitis        Past Surgical History:   Procedure Laterality Date    CATARACT EXTRACTION      CHOLECYSTECTOMY      COLONOSCOPY      two polyps in the rectum removed and biopsied diverticulosis in the sigmoid colon, external hemorrhoiods- Dr Barfield    COMBINED KIDNEY-PANCREAS TRANSPLANT N/A     CT BONE MARROW BIOPSY AND ASPIRATION  2019    CYSTOSCOPY N/A 10/13/2016    Procedure: CYSTOSCOPY, retrograde pyelogram, biopsy of ureteral polyp; Surgeon: Mary Lynch MD;  Location: [facility];  Service:    Earnest Caballero DILATION AND CURETTAGE OF UTERUS      ESOPHAGOGASTRODUODENOSCOPY N/A 2017    Procedure: ESOPHAGOGASTRODUODENOSCOPY (EGD); Surgeon: Burgess Oksana DO;  Location: [facility]; Service: Gastroenterology    EYE SURGERY      cataracts    FOOT AMPUTATION THROUGH METATARSAL Left     FOOT SURGERY Right     excision of metatarsal heads    HALLUX VALGUS CORRECTION Right     NEPHRECTOMY TRANSPLANTED ORGAN      PANCREATIC TRANSPLANT REMOVAL         Family History   Problem Relation Age of Onset    Hypertension Mother     Cancer Mother     Hypertension Father     Cancer Father     Cancer Maternal Grandfather     Cancer Paternal Grandmother     Cancer Paternal Grandfather     Depression Sister     Breast cancer Maternal Grandmother 77     I have reviewed and agree with the history as documented      Social History     Tobacco Use    Smoking status: Former Smoker     Last attempt to quit: 2012     Years since quittin 6    Smokeless tobacco: Never Used   Substance Use Topics    Alcohol use: Never     Frequency: Never     Binge frequency: Never     Comment: (history)    Drug use: Never     Types: Hydrocodone     Comment: Past cocaine use many years ago - no current use        Review of Systems   Constitutional: Positive for fatigue  Negative for chills and fever  HENT: Negative for congestion, sore throat and voice change  Eyes: Negative for photophobia and visual disturbance  Respiratory: Negative for cough, chest tightness and shortness of breath  Cardiovascular: Negative for chest pain, palpitations and leg swelling  Gastrointestinal: Positive for abdominal pain and diarrhea  Negative for constipation, nausea and vomiting  Endocrine: Negative for polydipsia, polyphagia and polyuria  Genitourinary: Negative for difficulty urinating, dysuria, flank pain and urgency  Musculoskeletal: Negative for back pain, gait problem and neck pain  Skin: Negative for pallor and rash  Neurological: Negative for dizziness, syncope, weakness, light-headedness, numbness and headaches  Psychiatric/Behavioral: Negative for agitation and confusion  All other systems reviewed and are negative  Physical Exam  ED Triage Vitals   Temperature Pulse Respirations Blood Pressure SpO2   12/09/19 1941 12/09/19 1940 12/09/19 1940 12/09/19 1940 12/09/19 1940   98 4 °F (36 9 °C) (!) 110 20 146/74 96 %      Temp Source Heart Rate Source Patient Position - Orthostatic VS BP Location FiO2 (%)   12/09/19 1941 12/09/19 1940 12/09/19 1940 12/09/19 1940 --   Oral Monitor Sitting Right arm       Pain Score       12/09/19 1940       Worst Possible Pain             Orthostatic Vital Signs  Vitals:    12/10/19 0324 12/10/19 0515 12/10/19 0600 12/10/19 0732   BP: (!) 198/73 (!) 207/81 (!) 207/81 (!) 207/82   Pulse: 105 (!) 112 (!) 112 (!) 111   Patient Position - Orthostatic VS: Lying Lying Lying Lying       Physical Exam   Constitutional: She is oriented to person, place, and time  She appears ill  Obese   HENT:   Head: Normocephalic and atraumatic  Eyes: Pupils are equal, round, and reactive to light  EOM are normal    Neck: Normal range of motion  Neck supple  Cardiovascular: Normal rate, regular rhythm, normal heart sounds and intact distal pulses  Pulmonary/Chest: Effort normal and breath sounds normal    Abdominal: Soft  Bowel sounds are normal  She exhibits no distension  There is tenderness in the suprapubic area  There is no rigidity, no rebound, no guarding, no tenderness at McBurney's point and negative Buck's sign  Neurological: She is alert and oriented to person, place, and time  Skin: Skin is warm and dry  Capillary refill takes less than 2 seconds  Nursing note and vitals reviewed        ED Medications  Medications   amLODIPine (NORVASC) tablet 10 mg (has no administration in time range)   busPIRone (BUSPAR) tablet 5 mg (has no administration in time range)   aspirin chewable tablet 81 mg (has no administration in time range)   ARIPiprazole (ABILIFY) tablet 30 mg (has no administration in time range)   cloNIDine (CATAPRES) tablet 0 3 mg (0 3 mg Oral Given 12/10/19 0534)   cholecalciferol (VITAMIN D3) tablet 3,000 Units (has no administration in time range)   doxazosin (CARDURA) tablet 4 mg (has no administration in time range)   DULoxetine (CYMBALTA) delayed release capsule 60 mg (has no administration in time range)   hydrALAZINE (APRESOLINE) tablet 100 mg (has no administration in time range)   folic acid (FOLVITE) tablet 1,000 mcg (has no administration in time range)   ferrous sulfate tablet 325 mg (has no administration in time range)   insulin glargine (LANTUS) subcutaneous injection 5 Units 0 05 mL (5 Units Subcutaneous Given 12/10/19 0440)   levothyroxine tablet 125 mcg (125 mcg Oral Given 12/10/19 0534)   metoprolol tartrate (LOPRESSOR) tablet 50 mg (has no administration in time range)   LORazepam (ATIVAN) tablet 2 mg (has no administration in time range)   pravastatin (PRAVACHOL) tablet 80 mg (has no administration in time range)   predniSONE tablet 5 mg (has no administration in time range)   tacrolimus (PROGRAF) capsule 3 mg (has no administration in time range)   sodium bicarbonate tablet 1,300 mg (has no administration in time range)   sevelamer (RENAGEL) tablet 800 mg (has no administration in time range)   sertraline (ZOLOFT) tablet 200 mg (has no administration in time range)   saccharomyces boulardii (FLORASTOR) capsule 250 mg (has no administration in time range)   rOPINIRole (REQUIP) tablet 0 25 mg (has no administration in time range)   sodium bicarbonate 150 mEq in dextrose 5 % 1,000 mL infusion (60 mL/hr Intravenous New Bag 12/10/19 0535)   insulin lispro (HumaLOG) 100 units/mL subcutaneous injection 1-6 Units (1 Units Subcutaneous Not Given 12/10/19 0654)   insulin lispro (HumaLOG) 100 units/mL subcutaneous injection 1-5 Units (has no administration in time range)   heparin (porcine) subcutaneous injection 5,000 Units (5,000 Units Subcutaneous Given 12/10/19 0535)   cefepime (MAXIPIME) 2 g/50 mL dextrose IVPB (0 mg Intravenous Stopped 12/10/19 0523)   lactated ringers bolus 1,000 mL (0 mL Intravenous Stopped 12/10/19 0422)   ondansetron (ZOFRAN) injection 4 mg (4 mg Intravenous Given 12/9/19 2139)   dicyclomine (BENTYL) tablet 20 mg (20 mg Oral Given 12/9/19 2141)   iohexol (OMNIPAQUE) 240 MG/ML solution 50 mL (50 mL Oral Given 12/9/19 2147)   HYDROmorphone (DILAUDID) injection 1 mg (1 mg Intravenous Given 12/9/19 2201)       Diagnostic Studies  Results Reviewed     Procedure Component Value Units Date/Time    Fingerstick Glucose (POCT) [[id number]]  (Normal) Collected:  12/10/19 0653    Lab Status:  Final result Updated:  12/10/19 0655     POC Glucose 80 mg/dl     Basic metabolic panel [[id number]]  (Abnormal) Resulted:  12/10/19 0506    Lab Status:  Final result Specimen:  Blood Updated:  12/10/19 0506     Sodium 146 mmol/L      Potassium 3 2 mmol/L      Chloride 120 mmol/L      CO2 16 mmol/L      ANION GAP 10 mmol/L BUN 41 mg/dL      Creatinine 2 29 mg/dL      Glucose 88 mg/dL      Calcium 8 4 mg/dL      eGFR 23 ml/min/1 73sq m     Narrative:       [place] guidelines for Chronic Kidney Disease (CKD):     Stage 1 with normal or high GFR (GFR > 90 mL/min/1 73 square meters)    Stage 2 Mild CKD (GFR = 60-89 mL/min/1 73 square meters)    Stage 3A Moderate CKD (GFR = 45-59 mL/min/1 73 square meters)    Stage 3B Moderate CKD (GFR = 30-44 mL/min/1 73 square meters)    Stage 4 Severe CKD (GFR = 15-29 mL/min/1 73 square meters)    Stage 5 End Stage CKD (GFR <15 mL/min/1 73 square meters)  Note: GFR calculation is accurate only with a steady state creatinine    Magnesium [[id number]]  (Normal) Resulted:  12/10/19 0506    Lab Status:  Final result Specimen:  Blood Updated:  12/10/19 0506     Magnesium 1 8 mg/dL     CBC (With Platelets) [[id number]]  (Abnormal) Resulted:  12/10/19 0502    Lab Status:  Final result Specimen:  Blood Updated:  12/10/19 0502     WBC 10 10 Thousand/uL      RBC 2 88 Million/uL      Hemoglobin 9 2 g/dL      Hematocrit 28 4 %      MCV 99 fL      MCH 31 9 pg      MCHC 32 4 g/dL      RDW 16 5 %      Platelets 842 Thousands/uL      MPV 11 7 fL     Fingerstick Glucose (POCT) [[id number]]  (Normal) Collected:  12/10/19 0422    Lab Status:  Final result Updated:  12/10/19 0424     POC Glucose 91 mg/dl     Blood gas, venous [[id number]]  (Abnormal) Collected:  12/10/19 0301    Lab Status:  Final result Specimen:  Blood from Arm, Left Updated:  12/10/19 0317     pH, Festus 7 333     pCO2, Festus 27 2 mm Hg      pO2, Festus 149 3 mm Hg      HCO3, Festus 14 1 mmol/L      Base Excess, Festus -10 5 mmol/L      O2 Content, Festus 13 0 ml/dL      O2 HGB, VENOUS 96 2 %     Tacrolimus level [[id number]] Collected:  12/10/19 0301    Lab Status:   In process Specimen:  Blood from Arm, Left Updated:  12/10/19 0312    Stool Enteric Bacterial Panel by PCR [[id number]]     Lab Status:  No result Specimen:  Stool     Fecal leukocytes [[id number]]     Lab Status:  No result Specimen:  Stool     Clostridium difficile toxin by PCR [[id number]]     Lab Status:  No result Specimen:  Stool from Per Rectum     Blood culture #1 [[id number]] Collected:  12/09/19 2023    Lab Status:  Preliminary result Specimen:  Blood from Arm, Right Updated:  12/10/19 0101     Blood Culture Received in Microbiology Lab  Culture in Progress  Blood culture #2 [[id number]] Collected:  12/09/19 2028    Lab Status:  Preliminary result Specimen:  Blood from Hand, Left Updated:  12/10/19 0101     Blood Culture Received in Microbiology Lab  Culture in Progress      Procalcitonin [[id number]]  (Abnormal) Collected:  12/09/19 2023    Lab Status:  Final result Specimen:  Blood from Arm, Right Updated:  12/09/19 2130     Procalcitonin 0 30 ng/ml     Comprehensive metabolic panel [[id number]]  (Abnormal) Collected:  12/09/19 2023    Lab Status:  Final result Specimen:  Blood from Arm, Right Updated:  12/09/19 2113     Sodium 150 mmol/L      Potassium 3 5 mmol/L      Chloride 123 mmol/L      CO2 14 mmol/L      ANION GAP 13 mmol/L      BUN 46 mg/dL      Creatinine 2 36 mg/dL      Glucose 124 mg/dL      Calcium 8 7 mg/dL      AST 19 U/L      ALT 17 U/L      Alkaline Phosphatase 106 U/L      Total Protein 7 0 g/dL      Albumin 3 2 g/dL      Total Bilirubin 0 47 mg/dL      eGFR 23 ml/min/1 73sq m     Narrative:       [place] guidelines for Chronic Kidney Disease (CKD):     Stage 1 with normal or high GFR (GFR > 90 mL/min/1 73 square meters)    Stage 2 Mild CKD (GFR = 60-89 mL/min/1 73 square meters)    Stage 3A Moderate CKD (GFR = 45-59 mL/min/1 73 square meters)    Stage 3B Moderate CKD (GFR = 30-44 mL/min/1 73 square meters)    Stage 4 Severe CKD (GFR = 15-29 mL/min/1 73 square meters)    Stage 5 End Stage CKD (GFR <15 mL/min/1 73 square meters)  Note: GFR calculation is accurate only with a steady state creatinine    Lactic acid x2 [[id number]] (Normal) Collected:  12/09/19 2023    Lab Status:  Final result Specimen:  Blood from Arm, Right Updated:  12/09/19 2110     LACTIC ACID 1 6 mmol/L     Narrative:       Result may be elevated if tourniquet was used during collection  CBC and differential [[id number]]  (Abnormal) Collected:  12/09/19 2023    Lab Status:  Final result Specimen:  Blood from Arm, Right Updated:  12/09/19 2109     WBC 9 58 Thousand/uL      RBC 3 11 Million/uL      Hemoglobin 9 9 g/dL      Hematocrit 30 8 %      MCV 99 fL      MCH 31 8 pg      MCHC 32 1 g/dL      RDW 16 2 %      MPV 11 8 fL      Platelets 225 Thousands/uL      nRBC 0 /100 WBCs     Narrative: This is an appended report  These results have been appended to a previously verified report  APTT [[id number]]  (Normal) Collected:  12/09/19 2023    Lab Status:  Final result Specimen:  Blood from Arm, Right Updated:  12/09/19 2102     PTT 24 seconds     Protime-INR [[id number]]  (Abnormal) Collected:  12/09/19 2023    Lab Status:  Final result Specimen:  Blood from Arm, Right Updated:  12/09/19 2102     Protime 14 9 seconds      INR 1 21    Urine Microscopic [[id number]]  (Abnormal) Collected:  12/09/19 2018    Lab Status:  Final result Specimen:  Urine, Clean Catch Updated:  12/09/19 2039     RBC, UA 4-10 /hpf      WBC, UA Innumerable /hpf      Epithelial Cells None Seen /hpf      Bacteria, UA Occasional /hpf      Hyaline Casts, UA 5-10 /lpf     Urine culture [[id number]] Collected:  12/09/19 2018    Lab Status:   In process Specimen:  Urine, Clean Catch Updated:  12/09/19 2039    UA w Reflex to Microscopic w Reflex to Culture [[id number]]  (Abnormal) Collected:  12/09/19 2018    Lab Status:  Final result Specimen:  Urine, Clean Catch Updated:  12/09/19 2037     Color, UA Yellow     Clarity, UA Cloudy     Specific Gravity, UA 1 013     pH, UA 7 0     Leukocytes, UA Large     Nitrite, UA Negative     Protein,  (3+) mg/dl      Glucose, UA Negative mg/dl      Ketones, UA Trace mg/dl      Urobilinogen, UA 0 2 E U /dl      Bilirubin, UA Negative     Blood, UA Small                 CT abdomen pelvis wo contrast   Final Result by Adriana Krishnamurthy MD (12/10 0059)      There is bowel wall thickening involving the proximal to mid sigmoid and the distal left colon  This may be due to colitis  Clinical and laboratory correlation is recommended  Follow-up after treatment is recommended  There is a small amount of ascites, increased slightly compared to the prior study  There are small bilateral pleural effusions, larger compared to the prior study  There is a pericardial effusion, this appears similar to the prior study  There is    some infiltration of the subcutaneous fat suggesting a degree of anasarca  The native kidneys are markedly atrophic bilaterally  A small right renal cyst appears unchanged  A transplant kidney is again evident within the left iliac fossa  There is some nonspecific perinephric stranding adjacent to the transplant kidney,    similar to the previous examination  There is no hydronephrosis  A urinary bladder diverticulum containing calculi and possibly surgical sutures appears similar to the prior study  Workstation performed: [id number]               Procedures  Procedures      ED Course                               MDM  Number of Diagnoses or Management Options  Abdominal pain:   Diarrhea:   Diagnosis management comments: 53-year-old female with multiple comorbidities presented to the emergency department for evaluation of abdominal pain  On arrival the patient was alert, awake, oriented and in moderate painful distress  Since being discharged 3 days ago the patient has had continued diarrhea, worsening abdominal pain and increased lethargy  The patient has not been compliant with her medication and has had decreased p o  Intake  On exam the patient had diffuse abdominal tenderness throughout    The patient was treated in the emergency department with a fluid bolus and Dilaudid  On re-evaluation the patient reported improvement with her abdominal pain  CT abdomen and pelvis showed possible colitis  Case was discussed with the admitting team who agreed to admit the patient for further evaluation, workup and treatment  Disposition  Final diagnoses:   Abdominal pain   Diarrhea   Dehydration   Lethargy     Time reflects when diagnosis was documented in both MDM as applicable and the Disposition within this note     Time User Action Codes Description Comment    12/10/2019  1:55 AM Krystal Herrmann Add [R10 9] Abdominal pain     12/10/2019  1:55 AM Krystal Herrmann Add [R19 7] Diarrhea     12/10/2019  2:49 AM Neil Driggs D Add [A79 8] Metabolic acidosis     97/72/1461  2:49 AM Neil Hussein D Add [Z94 0] Renal transplant recipient     12/10/2019  2:51 AM Neil Hussein D Add [N39 0] Urinary tract infection without hematuria, site unspecified     12/10/2019  7:43 AM Krystal Herrmann Add [E86 0] Dehydration     12/10/2019  7:43 AM Krystal Herrmann Add [R53 83] Lethargy       ED Disposition     ED Disposition Condition Date/Time Comment    Admit Stable Tue Dec 10, 2019  1:55 AM Case was discussed with AJAY and the patient's admission status was agreed to be Admission Status: inpatient status to the service of Dr Nohemy Lopez  Follow-up Information    None         Patient's Medications   Discharge Prescriptions    No medications on file     No discharge procedures on file  ED Provider  Attending physically available and evaluated Lincoln Connelly I managed the patient along with the ED Attending      Electronically Signed by         Haider Luther MD  12/10/19 6655

## 2019-12-10 NOTE — ASSESSMENT & PLAN NOTE
Lab Results   Component Value Date    HGBA1C 5 1 09/09/2019       No results for input(s): POCGLU in the last 72 hours      Blood Sugar Average: Last 72 hrs:  ·  continue Lantus (in place of Toujeo) 5 units qHS  · Add SSI with accu-cheks and carb controlled diet  · Initiate hypoglycemia protocol and avoid hypoglycemia

## 2019-12-10 NOTE — ED ATTENDING ATTESTATION
12/9/2019  IValeriano MD, saw and evaluated the patient  I have discussed the patient with the resident/non-physician practitioner and agree with the resident's/non-physician practitioner's findings, Plan of Care, and MDM as documented in the resident's/non-physician practitioner's note, except where noted  All available labs and Radiology studies were reviewed  I was present for key portions of any procedure(s) performed by the resident/non-physician practitioner and I was immediately available to provide assistance  At this point I agree with the current assessment done in the Emergency Department  I have conducted an independent evaluation of this patient a history and physical is as follows:    Saw patinet  With residient - H/o kidney and panceras transplant done [facility] [place] approx 20+ years ago    Recent hospitalization for infection per father has frequenT UTI's     C/o of diffuse abdominl pain mild diarrhea - negative c diff at [facility] hospitalization - per father  appears  Dehydrated  - plan check labs CTAP with PO cntracts - reeval  -  anticipate that patient will require hospitilization for dehydration/hypernatremia as well as management of abd pain        ED Course         Critical Care Time  Procedures

## 2019-12-10 NOTE — ASSESSMENT & PLAN NOTE
· Persistent since last admission; C diff during at recent admission at [facility] was negative  · CT abdomen/pelvis with evidence of colitis of proximal to mid sigmoid and distal left colon  · Given frequent hospitalizations and antibiotic use will recheck C diff    Additionally check fecal WBCs and stool culture

## 2019-12-10 NOTE — CONSULTS
[address] 53 y o  female MRN: [id number]  Unit/Bed#: -01 Encounter: [id number]    ASSESSMENT and PLAN:  Sai Dorsey is a 53 y o  female with a PMHx of CKD stage 4, s/p kidney/pancreas transplant in 1998, recurrent UTIs, T1DM, HTN, Chronic Metabolic Acidosis, MM, and depression who was admitted to [facility] after presenting with new onset confusion and diffuse abdominal pain in the setting of poor PO intake and significant diarrhea  A renal consultation is requested today for assistance in the management of acute on chronic metabolic acidosis and UTI      1) Diffuse Abdominal pain (possibly UTI vs Colitis)  - possibly secondary to UTI or Colitis  - patient has a h/o recurrent UTIs in the past year ("too many to count"), quite possibly due to immunosuppressive therapy  - pain is worse over allograft site but due to stable Cr, UTI is much higher on the DDx than rejection  - Recently was admitted to [facility] for VRE UTI for which she was treated with a complete course of Daptomycin and was discharged on 12/6/2019  - Recent frequent diarrhea  - Has diffuse abdominal pain more significant in the LLQ  - + CVA tenderness in the right and left (more significant over the right kidney)  - + Rigors, - chills, currently Afebrile  - UA in the ED positive for innumerbale WBCs, RBCs, as well as occasional bacteria  - WBCs on CBC at 10 10 but patient is on tacrolimus chronically and therefore this level could be very elevated in the setting of chronic immunosuppression  - Has a history of Urinary Retention which she was supposed to be managing with Straight Catheterizations, but has not been doing so --> this significantly increases her risk of infection especially in the allograft  - She is currently on Cefepime  - ID is on board and will be giving recommendations  - No signs of EDUARDO at the moment as Cr is at baseline    Plan  - Trend WBCs  - Continue with Cefepime, switch med once cultures come back as cefepime can contribute to and worsen mental status  - Check PVRs every shift to assess for urinary retention   If there are retention issues, consider getting Urology on board to assess need for intervention  - consider getting CMV PCR in setting of kidney transplant, colitis seen on CT, as well as lack of CMV ppx therapy as an outpatient  - recheck c diff in the setting of diarrhea  - Follow ID recommendations  - Follow up Blood and Urine cultures    2) Metabolic Acidosis  - seems to be acute on chronic  - Non-AG Met Acid (AG is 10)  - likely acute due to recent significant diarrhea and poor PO intake, without nausea and vomiting  - could be due to 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 and associated recent hypokalemia  - Possibly due to kidney damage from recurrent UTIs, but all recent US have been negative hydronephrosis and visible kidney scarring  - has not been taking any nephrotoxic agents other than Tacrolimus and Recent Daptomycin  - has history of chronic metabolic acidosis in the setting of kidney transplant  - pH on VBG is 7 33, CO2 is 27 2, and Bicarb is 14 1 --> these numbers are actually better than past numbers  - Patient is currently altered with confusion and AAOx1    Plan  - Urine electrolytes to determine UAG  - Follow up Tacrolimus level   - stop bicarbonate as her VBG pH is 7 33 (>7 2)  - switch to Isolyte in the setting of Hypokalemia of 3 2 as well as administer one time dose of 20meq of K+    3) AMS and Chronic Fatigue  - secondary to UTI vs  Metabolic Acidosis  - could even be due to development of transplant rejection as patient has abdominal pain over the transplant site and chronic fatigue, as well as AMS and Diarrhea  - Kidneys have been atrophic for a long time    Plan  - continue treatment as above  - monitor mental status for improvement  - consider biopsy of kidney as last resort to look for rejection if all other treatments as above do not produce favorable results     4) CKD 4  - Cr is at baseline which is in 2 0 - 2 5  - Current Cr is 2 29  - No signs right now of Acute on Chronic kidney injury  - Patient states to have regular urine output but not yet recorded in system  - following Dr Chinmay Whatley as an outpatient  - 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 but has been stable for years    Plan  - Avoid nephrotoxins  - Check BMP regulalry  - continue treatment as above    Manage other chronic problems as per primary team    SUMMARY OF RECOMMENDATIONS:  - Stop Bicarb drip and Switch to Isolyte in setting of hypokalemia  - one time dose of K+ 20meq  - PVRs every shift to assess for urinary retention  - Follow up Tacro level at 8am tomorrow morning  - Continue with Abx  - Continue with Antihypertensives  - Continue other chronic management as per primary team    HISTORY OF PRESENT ILLNESS:  Requesting Physician: Staci Blum DO  Reason for Consult: Management of Metabolic Acidosis and Possible UTI with h/o Kidney Transplant treated with Tacrolimus and Prednisone    Zachary Dumont is a 53 y o  female with a PMHx of CKD stage 4, s/p kidney/pancreas transplant in 1998, recurrent UTIs, T1DM, HTN, Chronic Metabolic Acidosis, MM, and depression who was admitted to [facility] after presenting with new onset confusion and diffuse abdominal pain in the setting of poor PO intake and significant diarrhea  A renal consultation is requested today for assistance in the management of acute on chronic metabolic acidosis and UTI  Ms  Keely Face was recently admitted and discharged from [facility] with UTI secondary to VRE which was treated with a complete course of Daptomycin  After being discharged on 12/6/2019, a few days later, she was found by her father to be confused and in her own urine and feces with associated significant and diffuse abdominal pain  She states that during last admission and after admission she has had significant diarrhea ("every hour") but was C  Diff negative   The patient states that she was taking all her home medications regularly before this recent previous admission  She endorsed not taking her home medications the last 4 days due to extreme fatigue and weakness  She does not have anyone with her at home to help take her medications  She has been following Dr Sudheer Herr as an outpatient to manage her CKD stage 4  In the ED vitals revealed significant HTN to SBP  > 200 and she continued to exhibit confusion and was found to have Non-AG Metabolic Acidosis for which she was started on an IV Bicarb drip as well as started back on her regular home medications  CT abdomen and pelvis done in the ED also revealed atrophic kidneys bilaterally and colitis which are unchanged from prior scans as well as newly worsening Ascites and small bilateral pleural effusions  Patient was seen and examined at bedside  She is visible distressed and is diffusely shaking while remaining afebrile  Her confusion seems to be unchanged as she is AAOx1 but is communicative  She denies taking any nephrotoxic agents recently and any major urinary symptoms such as dysuria, hematuria, or oliguria   Reliability of history given is suspect due to patient's current mental status    PAST MEDICAL HISTORY:  Past Medical History:   Diagnosis Date    Abnormal liver function test     Acute kidney injury ([address] )     Acute on chronic congestive heart failure (HCC)     Allergic urticaria     Anemia     Cancer (HCC)     Multiple myeloma    Cervical dysplasia     Cholelithiasis     Chronic diastolic (congestive) heart failure ([address] ) 9/18/2017    Diabetes mellitus (HCC)     Previous, controlled with diet    Diabetes mellitus with foot ulcer ([address] )     Disease of thyroid gland     Encephalopathy     Hematuria     + leak est- secondary to UTIs/panc drainage    History of transfusion     Hyperkalemia     Hypertension     Iliotibial band syndrome     Lumbar radiculopathy     Multiple myeloma ([address] )     Multiple myeloma (HCC)     Night blindness     Nonrheumatic aortic (valve) insufficiency     Pneumonia     Renal disorder     Retinopathy     Seborrhea     Seizure ([facility]     Shingles     Sinus tachycardia     B blocker - cardio echo stress test 02 normal/neg LE doppler 2/02 OK and 12/07    Status post simultaneous kidney and pancreas transplant ([facility] )     Toe amputation status     Trochanteric bursitis        PAST SURGICAL HISTORY:  Past Surgical History:   Procedure Laterality Date    CATARACT EXTRACTION      CHOLECYSTECTOMY      COLONOSCOPY      two polyps in the rectum removed and biopsied diverticulosis in the sigmoid colon, external hemorrhoiods- Dr Barfiled    COMBINED KIDNEY-PANCREAS TRANSPLANT N/A     CT BONE MARROW BIOPSY AND ASPIRATION  4/17/2019    CYSTOSCOPY N/A 10/13/2016    Procedure: CYSTOSCOPY, retrograde pyelogram, biopsy of ureteral polyp; Surgeon: Quyen Lentz MD;  Location: [facility];  Service:    Jeff Licona DILATION AND CURETTAGE OF UTERUS      ESOPHAGOGASTRODUODENOSCOPY N/A 11/20/2017    Procedure: ESOPHAGOGASTRODUODENOSCOPY (EGD); Surgeon: Quin Spence DO;  Location: [facility]; Service: Gastroenterology    EYE SURGERY      cataracts    FOOT AMPUTATION THROUGH METATARSAL Left     FOOT SURGERY Right     excision of metatarsal heads    HALLUX VALGUS CORRECTION Right     NEPHRECTOMY TRANSPLANTED ORGAN      PANCREATIC TRANSPLANT REMOVAL  1998       ALLERGIES:  Allergies   Allergen Reactions    Ixazomib Rash     Ninlaro    Revlimid [Lenalidomide] Rash    Cefadroxil     Latex Rash    Morphine Other (See Comments)     Other reaction(s): Other (See Comments)  projectile vomiting    Morphine And Related GI Intolerance    Myrbetriq [Mirabegron] Hives    Penicillins Hives     Other reaction(s):  Other (See Comments)  Respiratory Distress,hives  Tolerates cefazolin       SOCIAL HISTORY:  Social History     Substance and Sexual Activity   Alcohol Use Never    Frequency: Never  Binge frequency: Never    Comment: (history)     Social History     Substance and Sexual Activity   Drug Use Never    Types: Hydrocodone    Comment: Past cocaine use many years ago - no current use     Social History     Tobacco Use   Smoking Status Former Smoker    Last attempt to quit: 2012    Years since quittin 6   Smokeless Tobacco Never Used       FAMILY HISTORY:  Family History   Problem Relation Age of Onset    Hypertension Mother     Cancer Mother     Hypertension Father     Cancer Father     Cancer Maternal Grandfather     Cancer Paternal Grandmother     Cancer Paternal Grandfather     Depression Sister     Breast cancer Maternal Grandmother 77       MEDICATIONS:    Current Facility-Administered Medications:     amLODIPine (NORVASC) tablet 10 mg, 10 mg, Oral, QAM, Shannan Lagunas DO    ARIPiprazole (ABILIFY) tablet 30 mg, 30 mg, Oral, HS, Shannan Lagunas DO    aspirin chewable tablet 81 mg, 81 mg, Oral, Daily, Shannan Lagunas DO    busPIRone (BUSPAR) tablet 5 mg, 5 mg, Oral, BID, Shannan Lagunas DO    cefepime (MAXIPIME) 2 g/50 mL dextrose IVPB, 2,000 mg, Intravenous, Q24H, Shannan Lagunas DO, Stopped at 12/10/19 0954    cholecalciferol (VITAMIN D3) tablet 3,000 Units, 3,000 Units, Oral, Daily, Shannan Lagunas DO    cloNIDine (CATAPRES) tablet 0 3 mg, 0 3 mg, Oral, Q8H [address], Shannan Lagunas DO, 0 3 mg at 12/10/19 0534    doxazosin (CARDURA) tablet 4 mg, 4 mg, Oral, HS, Shannan Lagunas DO    DULoxetine (CYMBALTA) delayed release capsule 60 mg, 60 mg, Oral, BID, Shannan Lagunas DO    ferrous sulfate tablet 325 mg, 325 mg, Oral, Daily With Breakfast, Shannan Lagunas DO, 325 mg at 26 [phone]    folic acid (FOLVITE) tablet 1,000 mcg, 1,000 mcg, Oral, Daily, Shannan Lagunas DO    heparin (porcine) subcutaneous injection 5,000 Units, 5,000 Units, Subcutaneous, Q8H [address], 5,000 Units at 12/10/19 0535 **AND** [CANCELED] Platelet count, , , Once, Shannan Lagunas DO   hydrALAZINE (APRESOLINE) tablet 100 mg, 100 mg, Oral, TID, Del Chester, DO    insulin glargine (LANTUS) subcutaneous injection 5 Units 0 05 mL, 5 Units, Subcutaneous, HS, Del Chang, DO, 5 Units at 12/10/19 0440    insulin lispro (HumaLOG) 100 units/mL subcutaneous injection 1-5 Units, 1-5 Units, Subcutaneous, HS, Del Chang, DO    insulin lispro (HumaLOG) 100 units/mL subcutaneous injection 1-6 Units, 1-6 Units, Subcutaneous, TID AC **AND** Fingerstick Glucose (POCT), , , TID AC, Del Chester, DO    levothyroxine tablet 125 mcg, 125 mcg, Oral, Early Morning, Del Chester, DO, 125 mcg at 12/10/19 0534    LORazepam (ATIVAN) tablet 2 mg, 2 mg, Oral, TID PRN, Del Chester, DO    metoprolol tartrate (LOPRESSOR) tablet 50 mg, 50 mg, Oral, Q12H ARUNA, Del Chester, DO    pravastatin (PRAVACHOL) tablet 80 mg, 80 mg, Oral, Daily, Del Chester, DO    predniSONE tablet 5 mg, 5 mg, Oral, Daily, Del Chester, DO    rOPINIRole (REQUIP) tablet 0 25 mg, 0 25 mg, Oral, BID, Del Chester, DO    saccharomyces boulardii (FLORASTOR) capsule 250 mg, 250 mg, Oral, BID, Del Chester, DO    sertraline (ZOLOFT) tablet 200 mg, 200 mg, Oral, Daily, Del Chester, DO    sevelamer (RENAGEL) tablet 800 mg, 800 mg, Oral, TID With Meals, Del Chester, DO, 800 mg at 12/10/19 0801    sodium bicarbonate 150 mEq in dextrose 5 % 1,000 mL infusion, 60 mL/hr, Intravenous, Continuous, Del Chang, , Last Rate: 60 mL/hr at 12/10/19 0535, 60 mL/hr at 12/10/19 0535    sodium bicarbonate tablet 1,300 mg, 1,300 mg, Oral, TID, Del Chester, DO    tacrolimus (PROGRAF) capsule 3 mg, 3 mg, Oral, Q12H [facility], Del Chester,     REVIEW OF SYSTEMS:  Constitutional: Negative for fatigue, anorexia, fever  Positive for Chills and diaphoresis  HENT: Negative for postnasal drip  Eyes: Negative for visual disturbance  Respiratory: Negative for cough, shortness of breath and wheezing     Cardiovascular: Negative for chest pain, palpitations and leg swelling  Gastrointestinal: Negative for  Constipation, nausea and vomiting  Positive for diffuse abdominal pain and non-bloody diarrhea  Genitourinary: No dysuria, hematuria  Endocrine: Negative for polyuria  Musculoskeletal: Negative for arthralgias and joint swelling  Positive for back pain more located over right kidney  Skin: Negative for rash  Neurological: Negative for focal headaches, dizziness  Positive for Upper Extremity Weakness  AAOx1  Hematological: Negative for easy bruising or bleeding  Psychiatric/Behavioral: Positive for Confusion  All the systems were reviewed and were negative except as documented on the HPI  PHYSICAL EXAM:  Current Weight: Weight - Scale: 107 kg (235 lb 14 3 oz)  First Weight: Weight - Scale: 107 kg (235 lb 14 3 oz)  Vitals:    12/10/19 0515 12/10/19 0600 12/10/19 0732 12/10/19 0916   BP: (!) 207/81 (!) 207/81 (!) 207/82 (!) 196/86   BP Location: Right arm Right arm Left arm    Pulse: (!) 112 (!) 112 (!) 111    Resp: 18 18 19 20   Temp:   (!) 97 4 °F (36 3 °C) 98 8 °F (37 1 °C)   TempSrc:   Oral    SpO2: 96% 97% 98%    Weight:       Height:           Intake/Output Summary (Last 24 hours) at 12/10/2019 1049  Last data filed at 12/10/2019 0523  Gross per 24 hour   Intake 1050 ml   Output    Net 1050 ml     Physical Exam   Constitutional: She appears well-developed  She appears distressed  Diffuse shaking during examination without feeling of chills   HENT:   Head: Normocephalic and atraumatic  Eyes: Pupils are equal, round, and reactive to light  Conjunctivae and EOM are normal    Neck: Normal range of motion  Neck supple  No JVD present  No thyromegaly present  Cardiovascular: Normal rate, regular rhythm, normal heart sounds and intact distal pulses  Exam reveals no gallop and no friction rub  No murmur heard  Pulmonary/Chest: Effort normal and breath sounds normal  No respiratory distress  She has no wheezes  She has no rales  Abdominal: Soft  Bowel sounds are normal  She exhibits mass (hard structure feeling like enlarged and inflamed bowel palpable in the left lower quadrant)  She exhibits no distension  There is tenderness (diffuse abdominal tenderness in all quadrants but more significant in left lower quadrant  Tenderness also present directly over bladder )  There is no guarding  Red and Blue discoloration over lower abdomen in various spots  Likely secondary to heparin injections   Genitourinary:   Genitourinary Comments: Significant CVA Tenderness over right kidney  Slight CVA Tenderness over left kidney  Urine output not recorded but patient is expressing consistent urination without hematuria or dysuria   Musculoskeletal: Normal range of motion  She exhibits no edema  2/5 strength in upper extremities  Poor  strength bilaterally    Neurological: She is alert  She displays normal reflexes  No cranial nerve deficit or sensory deficit  Coordination normal    AAOx1 - thinks she is in the ER, cannot recall date or year   Skin: Skin is warm  No rash noted  She is diaphoretic  No erythema  No pallor  Psychiatric: She has a normal mood and affect  Her behavior is normal  Thought content normal    Nursing note and vitals reviewed          Invasive Devices:      Lab Results:   Results from last 7 days   Lab Units 12/10/19  0506 12/10/19  0502 12/09/19 2023 12/06/19  0601  12/04/19  0530   WBC Thousand/uL  --  10 10 9 58 5 00   < >  --    HEMOGLOBIN g/dL  --  9 2* 9 9* 8 3*   < >  --    HEMATOCRIT %  --  28 4* 30 8* 25 4*   < >  --    PLATELETS Thousands/uL  --  184 223 152   < >  --    POTASSIUM mmol/L 3 2*  --  3 5 3 7   < > 3 8   CHLORIDE mmol/L 120*  --  123* 109*   < > 108   CO2 mmol/L 16*  --  14* 17*   < > 17*   BUN mg/dL 41*  --  46* 51*   < > 56*   CREATININE mg/dL 2 29*  --  2 36* 2 71*   < > 3 39*   CALCIUM mg/dL 8 4  --  8 7 8 3   < > 8 4   MAGNESIUM mg/dL 1 8  --   --   --   --   --    PHOSPHORUS mg/dL  --   -- --   --   --  4 7*   ALK PHOS U/L  --   --  106  --   --   --    ALT U/L  --   --  17  --   --   --    AST U/L  --   --  19  --   --   --     < > = values in this interval not displayed  Other Studies:   US Kidney 9/9/2019  IMPRESSION:  1  Elevated arterial resistive indices in the transplant kidney up to 0 8 concerning for possible development of transplant rejection or ATN  Nephrology consult recommended  No hydronephrosis or renal vein thrombosis identified  2  Atrophic native kidneys  US Kidney 10/1/2019  IMPRESSION:  Arterial resistive indices within the transplant lower pole renal parenchyma and transplant renal artery measuring between 0 8 and 0 9, previously resistive indices measuring up to 0 8 in early September  Transplant renal artery and transplant renal vein are patent  Spectral analysis of the transplant renal artery does not suggest stenosis  Findings are more concerning for developing transplant rejection      No hydronephrosis  Simple renal cysts within both the left pelvic transplant kidney and right native kidney      Left native kidney not reliably identified,  known to be significantly atrophic     CT Abd and Pelvis 12/10/2019  IMPRESSION:     There is bowel wall thickening involving the proximal to mid sigmoid and the distal left colon  This may be due to colitis  Clinical and laboratory correlation is recommended  Follow-up after treatment is recommended      There is a small amount of ascites, increased slightly compared to the prior study  There are small bilateral pleural effusions, larger compared to the prior study  There is a pericardial effusion, this appears similar to the prior study  There is some infiltration of the subcutaneous fat suggesting a degree of anasarca      The native kidneys are markedly atrophic bilaterally  A small right renal cyst appears unchanged  A transplant kidney is again evident within the left iliac fossa    There is some nonspecific perinephric stranding adjacent to the transplant kidney, similar to the previous examination  There is no hydronephrosis  A urinary bladder diverticulum containing calculi and possibly surgical sutures appears similar to the prior study

## 2019-12-10 NOTE — ASSESSMENT & PLAN NOTE
· Creatinine at baseline  · Monitor BMP while inpatient  · Avoid nephrotoxins as able, renally dose medications as indicated

## 2019-12-10 NOTE — TELEPHONE ENCOUNTER
Called the pharmacy and corrected the script to 3 weeks on and 1 week off  Did inform them that patient was readmitted to the hospital  Reji Odell stated they would call later this week for an update

## 2019-12-10 NOTE — CONSULTS
Consultation - Infectious Disease   Da Fraser 53 y o  female MRN: [id number]  Unit/Bed#: -01 Encounter: [id number]      IMPRESSION & RECOMMENDATIONS:   Impression/Recommendations:  1  Colitis  Consider C diff given recent hospitalization although recent toxin 12/3 was negative  Consider viral infection  Consider other bacterial pathogen although less likely  Procalcitonin bland  Clinically stable without sepsis  Abdominal exam benign  Rec:  · Start empirical oral vancomycin for now  · Check C diff, stool enteric PCR continues to have diarrhea  · Serial abdominal exams  · Follow temperatures closely  · Recheck CBC in a m  · Antiemetics p r n  · Supportive care as per the primary service    2  Asymptomatic bacteriuria  Seen on UA  Likely chronically colonized  No active symptoms of UTI  Rec:  · Discontinue systemic antibiotics and follow closely  · Follow-up urine culture although in absence of symptoms would not treat    3  Recent VRE UTI  Status post 7 days daptomycin with clinical improvement    4  History of renal/pancreatic transplant   1998  Has pancreatic secretions draining in to bladder  On chronic immunosuppression      5  CKD  Stable at baseline creatinine 2 5     6   MM  Recently started on Revlimid  The above impression and plan was discussed in detail with Dr Yaneli Garcia  Antibiotics:  Cefepime # 1    Thank you for this consultation  We will follow along with you  HISTORY OF PRESENT ILLNESS:  Reason for Consult:  UTI    HPI: Da Fraser is a 53 y o  female well known to our service  She has a history of remote renal pancreas transplant in 1998 on chronic immunosuppression  Based on this surgical history, her exocrine secretions are excreted into her bladder  She also has a history of multiple myeloma and is currently receiving Revlimid    She has a history of frequent UTIs and was recently admitted to [address] [place] [address] in late November for VRE UTI treated with daptomycin  During that hospitalization she was noted to have diarrhea which was attributed to laxatives  She was recently discharged on 12/06  She presented to the emergency department yesterday with abdominal pain, nausea, and diarrhea  Upon presentation he was noted to be afebrile but had tachycardia  Her labs revealed a normal WBC and her procalcitonin was negative for sepsis cut off  A UA showed pyuria  She underwent a CTA/P which showed bowel wall thickening of the left colon possibly representing colitis  There was also nonspecific perinephric stranding around the transplanted kidney  She was given a dose of cefepime  We are asked to comment on further evaluation and management  Patient denies any recent sick contacts  In performing this consult, I have reviewed prior admission and outpatient visit records in detail  REVIEW OF SYSTEMS:  Denies dysuria, frequency, or hesitancy  Has the urge to have diarrhea but RN reports no stools since admission  Has extreme nausea but no vomiting  Is requesting Zofran  A complete system-based review of systems is otherwise negative      PAST MEDICAL HISTORY:  Past Medical History:   Diagnosis Date    Abnormal liver function test     Acute kidney injury ([address] )     Acute on chronic congestive heart failure (HCC)     Allergic urticaria     Anemia     Cancer (HCC)     Multiple myeloma    Cervical dysplasia     Cholelithiasis     Chronic diastolic (congestive) heart failure ([address] ) 9/18/2017    Diabetes mellitus ([address] )     Previous, controlled with diet    Diabetes mellitus with foot ulcer ([address] )     Disease of thyroid gland     Encephalopathy     Hematuria     + leak est- secondary to UTIs/panc drainage    History of transfusion     Hyperkalemia     Hypertension     Iliotibial band syndrome     Lumbar radiculopathy     Multiple myeloma (HCC)     Multiple myeloma ([address] )     Night blindness     Nonrheumatic aortic (valve) insufficiency     Pneumonia     Renal disorder     Retinopathy     Seborrhea     [facility]     Shingles     Sinus tachycardia     B blocker - cardio echo stress test 02 normal/neg LE doppler  OK and     Status post simultaneous kidney and pancreas transplant ([facility]     Toe amputation status     Trochanteric bursitis      Past Surgical History:   Procedure Laterality Date    CATARACT EXTRACTION      CHOLECYSTECTOMY      COLONOSCOPY      two polyps in the rectum removed and biopsied diverticulosis in the sigmoid colon, external hemorrhoiods- Dr Barfield    COMBINED KIDNEY-PANCREAS TRANSPLANT N/A     CT BONE MARROW BIOPSY AND ASPIRATION  2019    CYSTOSCOPY N/A 10/13/2016    Procedure: CYSTOSCOPY, retrograde pyelogram, biopsy of ureteral polyp; Surgeon: Jeffrey Arredondo MD;  Location: [facility];  Service:    [facility] DILATION AND CURETTAGE OF UTERUS      ESOPHAGOGASTRODUODENOSCOPY N/A 2017    Procedure: ESOPHAGOGASTRODUODENOSCOPY (EGD); Surgeon: Addi Vieira DO;  Location: [facility];   Service: Gastroenterology    EYE SURGERY      cataracts    FOOT AMPUTATION THROUGH METATARSAL Left     FOOT SURGERY Right     excision of metatarsal heads    HALLUX VALGUS CORRECTION Right     NEPHRECTOMY TRANSPLANTED ORGAN      PANCREATIC TRANSPLANT REMOVAL         FAMILY HISTORY:  Non-contributory    SOCIAL HISTORY:  Social History     Substance and Sexual Activity   Alcohol Use Never    Frequency: Never    Binge frequency: Never    Comment: (history)     Social History     Substance and Sexual Activity   Drug Use Never    Types: Hydrocodone    Comment: Past cocaine use many years ago - no current use     Social History     Tobacco Use   Smoking Status Former Smoker    Last attempt to quit: 2012    Years since quittin 6   Smokeless Tobacco Never Used       ALLERGIES:  Allergies   Allergen Reactions    Ixazomib Rash     Ninlaro    Revlimid [Lenalidomide] Rash    Cefadroxil     Latex Rash    Morphine Other (See Comments)     Other reaction(s): Other (See Comments)  projectile vomiting    Morphine And Related GI Intolerance    Myrbetriq [Mirabegron] Hives    Penicillins Hives     Other reaction(s): Other (See Comments)  Respiratory Distress,hives  Tolerates cefazolin       MEDICATIONS:  All current active medications have been reviewed  PHYSICAL EXAM:  Vitals:  Temp:  [97 4 °F (36 3 °C)-98 8 °F (37 1 °C)] 98 8 °F (37 1 °C)  HR:  [104-112] 111  Resp:  [18-20] 20  BP: (146-207)/(68-88) 165/68  SpO2:  [94 %-98 %] 98 %  Temp (24hrs), Av 2 °F (36 8 °C), Min:97 4 °F (36 3 °C), Max:98 8 °F (37 1 °C)  Current: Temperature: 98 8 °F (37 1 °C)     Physical Exam:  General:  Well-appearing but weak and fatigued female, in no acute distress  Eyes:  Conjunctive clear with no hemorrhages or effusions  Oropharynx:  No ulcers, no lesions  Neck:  Supple, no lymphadenopathy  Lungs:  Clear to auscultation bilaterally, no accessory muscle use  Cardiac:  Regular rate and rhythm, no murmurs  Abdomen:  Soft, mildly distended, diffuse mild tenderness to palpation without rebound or guarding  Extremities:  No peripheral cyanosis, clubbing, or edema  Skin:  No rashes, no ulcers  Neurological:  Moves all four extremities spontaneously, sensation grossly intact    LABS, IMAGING, & OTHER STUDIES:  Lab Results:  I have personally reviewed pertinent labs    Results from last 7 days   Lab Units 12/10/19  0506 19  0601   POTASSIUM mmol/L 3 2* 3 5 3 7   CHLORIDE mmol/L 120* 123* 109*   CO2 mmol/L 16* 14* 17*   BUN mg/dL 41* 46* 51*   CREATININE mg/dL 2 29* 2 36* 2 71*   EGFR ml/min/1 73sq m 23 23 19   CALCIUM mg/dL 8 4 8 7 8 3   AST U/L  --  19  --    ALT U/L  --  17  --    ALK PHOS U/L  --  106  --      Results from last 7 days   Lab Units 12/10/19  0502 19  0601   WBC Thousand/uL 10 10 9 58 5 00   HEMOGLOBIN g/dL 9 2* 9 9* 8 3*   PLATELETS Thousands/uL 184 223 152 Results from last 7 days   Lab Units 12/09/19 2028 12/09/19 2023   BLOOD CULTURE  Received in Microbiology Lab  Culture in Progress  Received in Microbiology Lab  Culture in Progress  Imaging Studies:   I have personally reviewed pertinent imaging study reports and images in PACS  CTA/P reviewed personally distal left colitis    EKG, Pathology, and Other Studies:   I have personally reviewed pertinent reports

## 2019-12-10 NOTE — TREATMENT PLAN
Discussed with Primary Team AP thru messaging and spoke with Pharmacy    If BS decrease further tonight, could change fluids to D5W with 1/2 NS with 75 meq bicarb   Pharmacy will need to be directly called to help build the fluids as cannot order directly    Will need to check potassium q 8 hours if doing this change given hypokalemia earlier    Thank you

## 2019-12-11 PROBLEM — R82.71 ASYMPTOMATIC BACTERIURIA: Status: ACTIVE | Noted: 2019-12-11

## 2019-12-11 LAB
ALBUMIN SERPL BCP-MCNC: 2.5 G/DL (ref 3.5–5)
ALP SERPL-CCNC: 101 U/L (ref 46–116)
ALT SERPL W P-5'-P-CCNC: 21 U/L (ref 12–78)
AMYLASE SERPL-CCNC: 9 IU/L (ref 25–115)
ANION GAP SERPL CALCULATED.3IONS-SCNC: 8 MMOL/L (ref 4–13)
AST SERPL W P-5'-P-CCNC: 30 U/L (ref 5–45)
BILIRUB SERPL-MCNC: 0.48 MG/DL (ref 0.2–1)
BUN SERPL-MCNC: 39 MG/DL (ref 5–25)
C DIFF TOX GENS STL QL NAA+PROBE: NEGATIVE
CALCIUM SERPL-MCNC: 8.4 MG/DL (ref 8.3–10.1)
CAMPYLOBACTER DNA SPEC NAA+PROBE: NORMAL
CHLORIDE SERPL-SCNC: 120 MMOL/L (ref 100–108)
CO2 SERPL-SCNC: 18 MMOL/L (ref 21–32)
CREAT SERPL-MCNC: 2.24 MG/DL (ref 0.6–1.3)
ERYTHROCYTE [DISTWIDTH] IN BLOOD BY AUTOMATED COUNT: 16.6 % (ref 11.6–15.1)
GFR SERPL CREATININE-BSD FRML MDRD: 24 ML/MIN/1.73SQ M
GLUCOSE SERPL-MCNC: 100 MG/DL (ref 65–140)
GLUCOSE SERPL-MCNC: 106 MG/DL (ref 65–140)
GLUCOSE SERPL-MCNC: 111 MG/DL (ref 65–140)
GLUCOSE SERPL-MCNC: 121 MG/DL (ref 65–140)
GLUCOSE SERPL-MCNC: 128 MG/DL (ref 65–140)
GLUCOSE SERPL-MCNC: 129 MG/DL (ref 65–140)
GLUCOSE SERPL-MCNC: 87 MG/DL (ref 65–140)
GLUCOSE SERPL-MCNC: 97 MG/DL (ref 65–140)
HCT VFR BLD AUTO: 27.8 % (ref 34.8–46.1)
HGB BLD-MCNC: 8.8 G/DL (ref 11.5–15.4)
LIPASE SERPL-CCNC: 24 U/L (ref 73–393)
MCH RBC QN AUTO: 31.3 PG (ref 26.8–34.3)
MCHC RBC AUTO-ENTMCNC: 31.7 G/DL (ref 31.4–37.4)
MCV RBC AUTO: 99 FL (ref 82–98)
PLATELET # BLD AUTO: 206 THOUSANDS/UL (ref 149–390)
PMV BLD AUTO: 11.6 FL (ref 8.9–12.7)
POTASSIUM SERPL-SCNC: 3.1 MMOL/L (ref 3.5–5.3)
PROT SERPL-MCNC: 6 G/DL (ref 6.4–8.2)
RBC # BLD AUTO: 2.81 MILLION/UL (ref 3.81–5.12)
SALMONELLA DNA SPEC QL NAA+PROBE: NORMAL
SHIGA TOXIN STX GENE SPEC NAA+PROBE: NORMAL
SHIGELLA DNA SPEC QL NAA+PROBE: NORMAL
SODIUM SERPL-SCNC: 146 MMOL/L (ref 136–145)
WBC # BLD AUTO: 10.49 THOUSAND/UL (ref 4.31–10.16)

## 2019-12-11 PROCEDURE — 83690 ASSAY OF LIPASE: CPT | Performed by: INTERNAL MEDICINE

## 2019-12-11 PROCEDURE — 82948 REAGENT STRIP/BLOOD GLUCOSE: CPT

## 2019-12-11 PROCEDURE — 99232 SBSQ HOSP IP/OBS MODERATE 35: CPT | Performed by: INTERNAL MEDICINE

## 2019-12-11 PROCEDURE — 97163 PT EVAL HIGH COMPLEX 45 MIN: CPT

## 2019-12-11 PROCEDURE — G8979 MOBILITY GOAL STATUS: HCPCS

## 2019-12-11 PROCEDURE — 80053 COMPREHEN METABOLIC PANEL: CPT | Performed by: INTERNAL MEDICINE

## 2019-12-11 PROCEDURE — G8978 MOBILITY CURRENT STATUS: HCPCS

## 2019-12-11 PROCEDURE — 97167 OT EVAL HIGH COMPLEX 60 MIN: CPT

## 2019-12-11 PROCEDURE — 99232 SBSQ HOSP IP/OBS MODERATE 35: CPT | Performed by: HOSPITALIST

## 2019-12-11 PROCEDURE — G8988 SELF CARE GOAL STATUS: HCPCS

## 2019-12-11 PROCEDURE — 80197 ASSAY OF TACROLIMUS: CPT | Performed by: INTERNAL MEDICINE

## 2019-12-11 PROCEDURE — 85027 COMPLETE CBC AUTOMATED: CPT | Performed by: INTERNAL MEDICINE

## 2019-12-11 PROCEDURE — G8987 SELF CARE CURRENT STATUS: HCPCS

## 2019-12-11 PROCEDURE — 82150 ASSAY OF AMYLASE: CPT | Performed by: INTERNAL MEDICINE

## 2019-12-11 RX ORDER — ACETAMINOPHEN 325 MG/1
650 TABLET ORAL EVERY 6 HOURS PRN
Status: DISCONTINUED | OUTPATIENT
Start: 2019-12-11 | End: 2020-01-02 | Stop reason: HOSPADM

## 2019-12-11 RX ORDER — POTASSIUM CHLORIDE 20 MEQ/1
40 TABLET, EXTENDED RELEASE ORAL 2 TIMES DAILY
Status: COMPLETED | OUTPATIENT
Start: 2019-12-11 | End: 2019-12-11

## 2019-12-11 RX ORDER — SODIUM CHLORIDE, SODIUM GLUCONATE, SODIUM ACETATE, POTASSIUM CHLORIDE, MAGNESIUM CHLORIDE, SODIUM PHOSPHATE, DIBASIC, AND POTASSIUM PHOSPHATE .53; .5; .37; .037; .03; .012; .00082 G/100ML; G/100ML; G/100ML; G/100ML; G/100ML; G/100ML; G/100ML
500 INJECTION, SOLUTION INTRAVENOUS ONCE
Status: COMPLETED | OUTPATIENT
Start: 2019-12-11 | End: 2019-12-11

## 2019-12-11 RX ORDER — CLONIDINE HYDROCHLORIDE 0.1 MG/1
0.2 TABLET ORAL EVERY 8 HOURS SCHEDULED
Status: DISCONTINUED | OUTPATIENT
Start: 2019-12-11 | End: 2019-12-23

## 2019-12-11 RX ORDER — DOXAZOSIN 2 MG/1
2 TABLET ORAL
Status: DISCONTINUED | OUTPATIENT
Start: 2019-12-11 | End: 2020-01-02 | Stop reason: HOSPADM

## 2019-12-11 RX ORDER — HYDRALAZINE HYDROCHLORIDE 50 MG/1
50 TABLET, FILM COATED ORAL EVERY 8 HOURS SCHEDULED
Status: DISCONTINUED | OUTPATIENT
Start: 2019-12-11 | End: 2019-12-11

## 2019-12-11 RX ORDER — HYDRALAZINE HYDROCHLORIDE 50 MG/1
50 TABLET, FILM COATED ORAL EVERY 8 HOURS SCHEDULED
Status: DISCONTINUED | OUTPATIENT
Start: 2019-12-11 | End: 2020-01-01

## 2019-12-11 RX ORDER — LOPERAMIDE HYDROCHLORIDE 2 MG/1
4 CAPSULE ORAL 3 TIMES DAILY PRN
Status: DISCONTINUED | OUTPATIENT
Start: 2019-12-11 | End: 2019-12-17

## 2019-12-11 RX ORDER — LOPERAMIDE HYDROCHLORIDE 2 MG/1
2 CAPSULE ORAL 4 TIMES DAILY PRN
Status: DISCONTINUED | OUTPATIENT
Start: 2019-12-11 | End: 2019-12-11

## 2019-12-11 RX ORDER — MAGNESIUM HYDROXIDE/ALUMINUM HYDROXICE/SIMETHICONE 120; 1200; 1200 MG/30ML; MG/30ML; MG/30ML
30 SUSPENSION ORAL EVERY 4 HOURS PRN
Status: DISCONTINUED | OUTPATIENT
Start: 2019-12-11 | End: 2019-12-30

## 2019-12-11 RX ADMIN — ALUMINUM HYDROXIDE, MAGNESIUM HYDROXIDE, AND SIMETHICONE 30 ML: 200; 200; 20 SUSPENSION ORAL at 01:48

## 2019-12-11 RX ADMIN — HYDRALAZINE HYDROCHLORIDE 50 MG: 50 TABLET, FILM COATED ORAL at 13:31

## 2019-12-11 RX ADMIN — LORAZEPAM 2 MG: 1 TABLET ORAL at 01:49

## 2019-12-11 RX ADMIN — SODIUM CHLORIDE, SODIUM GLUCONATE, SODIUM ACETATE, POTASSIUM CHLORIDE, MAGNESIUM CHLORIDE, SODIUM PHOSPHATE, DIBASIC, AND POTASSIUM PHOSPHATE 75 ML/HR: .53; .5; .37; .037; .03; .012; .00082 INJECTION, SOLUTION INTRAVENOUS at 03:49

## 2019-12-11 RX ADMIN — PRAVASTATIN SODIUM 80 MG: 80 TABLET ORAL at 08:23

## 2019-12-11 RX ADMIN — Medication 250 MG: at 17:16

## 2019-12-11 RX ADMIN — SODIUM BICARBONATE 650 MG TABLET 1300 MG: at 08:23

## 2019-12-11 RX ADMIN — TACROLIMUS 3 MG: 1 CAPSULE ORAL at 08:25

## 2019-12-11 RX ADMIN — CLONIDINE HYDROCHLORIDE 0.2 MG: 0.1 TABLET ORAL at 13:30

## 2019-12-11 RX ADMIN — HYDRALAZINE HYDROCHLORIDE 100 MG: 50 TABLET, FILM COATED ORAL at 06:30

## 2019-12-11 RX ADMIN — SEVELAMER HYDROCHLORIDE 800 MG: 800 TABLET, FILM COATED PARENTERAL at 12:30

## 2019-12-11 RX ADMIN — CLONIDINE HYDROCHLORIDE 0.3 MG: 0.1 TABLET ORAL at 06:29

## 2019-12-11 RX ADMIN — HYDRALAZINE HYDROCHLORIDE 50 MG: 50 TABLET, FILM COATED ORAL at 21:56

## 2019-12-11 RX ADMIN — LOPERAMIDE HYDROCHLORIDE 2 MG: 2 CAPSULE ORAL at 15:41

## 2019-12-11 RX ADMIN — SODIUM BICARBONATE 650 MG TABLET 1300 MG: at 15:41

## 2019-12-11 RX ADMIN — DULOXETINE HYDROCHLORIDE 60 MG: 60 CAPSULE, DELAYED RELEASE ORAL at 17:16

## 2019-12-11 RX ADMIN — DOXAZOSIN 2 MG: 2 TABLET ORAL at 21:55

## 2019-12-11 RX ADMIN — SERTRALINE HYDROCHLORIDE 200 MG: 100 TABLET ORAL at 08:23

## 2019-12-11 RX ADMIN — Medication 250 MG: at 08:23

## 2019-12-11 RX ADMIN — POTASSIUM CHLORIDE 40 MEQ: 1500 TABLET, EXTENDED RELEASE ORAL at 17:15

## 2019-12-11 RX ADMIN — LOPERAMIDE HYDROCHLORIDE 2 MG: 2 CAPSULE ORAL at 17:25

## 2019-12-11 RX ADMIN — FERROUS SULFATE TAB 325 MG (65 MG ELEMENTAL FE) 325 MG: 325 (65 FE) TAB at 08:23

## 2019-12-11 RX ADMIN — ROPINIROLE 0.25 MG: 0.25 TABLET, FILM COATED ORAL at 08:23

## 2019-12-11 RX ADMIN — DULOXETINE HYDROCHLORIDE 60 MG: 60 CAPSULE, DELAYED RELEASE ORAL at 08:23

## 2019-12-11 RX ADMIN — SEVELAMER HYDROCHLORIDE 800 MG: 800 TABLET, FILM COATED PARENTERAL at 08:23

## 2019-12-11 RX ADMIN — ARIPIPRAZOLE 30 MG: 10 TABLET ORAL at 21:56

## 2019-12-11 RX ADMIN — AMLODIPINE BESYLATE 10 MG: 10 TABLET ORAL at 08:23

## 2019-12-11 RX ADMIN — SODIUM BICARBONATE 650 MG TABLET 1300 MG: at 21:56

## 2019-12-11 RX ADMIN — SEVELAMER HYDROCHLORIDE 800 MG: 800 TABLET, FILM COATED PARENTERAL at 17:16

## 2019-12-11 RX ADMIN — ROPINIROLE 0.25 MG: 0.25 TABLET, FILM COATED ORAL at 17:16

## 2019-12-11 RX ADMIN — HEPARIN SODIUM 5000 UNITS: 5000 INJECTION INTRAVENOUS; SUBCUTANEOUS at 21:57

## 2019-12-11 RX ADMIN — SODIUM CHLORIDE, SODIUM GLUCONATE, SODIUM ACETATE, POTASSIUM CHLORIDE AND MAGNESIUM CHLORIDE 500 ML: 526; 502; 368; 37; 30 INJECTION, SOLUTION INTRAVENOUS at 13:24

## 2019-12-11 RX ADMIN — VANCOMYCIN HYDROCHLORIDE 125 MG: 5 INJECTION, POWDER, LYOPHILIZED, FOR SOLUTION INTRAVENOUS at 12:30

## 2019-12-11 RX ADMIN — LEVOTHYROXINE SODIUM 125 MCG: 125 TABLET ORAL at 06:27

## 2019-12-11 RX ADMIN — ACETAMINOPHEN 650 MG: 325 TABLET ORAL at 01:49

## 2019-12-11 RX ADMIN — CLONIDINE HYDROCHLORIDE 0.2 MG: 0.1 TABLET ORAL at 21:56

## 2019-12-11 RX ADMIN — BUSPIRONE HYDROCHLORIDE 5 MG: 5 TABLET ORAL at 08:24

## 2019-12-11 RX ADMIN — LOPERAMIDE HYDROCHLORIDE 4 MG: 2 CAPSULE ORAL at 21:56

## 2019-12-11 RX ADMIN — METOPROLOL TARTRATE 50 MG: 50 TABLET, FILM COATED ORAL at 08:24

## 2019-12-11 RX ADMIN — HEPARIN SODIUM 5000 UNITS: 5000 INJECTION INTRAVENOUS; SUBCUTANEOUS at 06:27

## 2019-12-11 RX ADMIN — TACROLIMUS 3 MG: 1 CAPSULE ORAL at 21:55

## 2019-12-11 RX ADMIN — HEPARIN SODIUM 5000 UNITS: 5000 INJECTION INTRAVENOUS; SUBCUTANEOUS at 13:30

## 2019-12-11 RX ADMIN — POTASSIUM CHLORIDE 40 MEQ: 1500 TABLET, EXTENDED RELEASE ORAL at 09:59

## 2019-12-11 RX ADMIN — BUSPIRONE HYDROCHLORIDE 5 MG: 5 TABLET ORAL at 17:16

## 2019-12-11 RX ADMIN — FOLIC ACID 1000 MCG: 1 TABLET ORAL at 08:23

## 2019-12-11 RX ADMIN — VANCOMYCIN HYDROCHLORIDE 125 MG: 5 INJECTION, POWDER, LYOPHILIZED, FOR SOLUTION INTRAVENOUS at 06:33

## 2019-12-11 RX ADMIN — PREDNISONE 5 MG: 5 TABLET ORAL at 08:23

## 2019-12-11 RX ADMIN — MELATONIN 3000 UNITS: at 08:23

## 2019-12-11 RX ADMIN — ASPIRIN 81 MG 81 MG: 81 TABLET ORAL at 08:23

## 2019-12-11 RX ADMIN — METOPROLOL TARTRATE 50 MG: 50 TABLET, FILM COATED ORAL at 21:56

## 2019-12-11 NOTE — ASSESSMENT & PLAN NOTE
· Appears acute on chronic  · S/P kidney and pancreatic transplant in 1998  · continue p o  and trend BMP  · Nephrology consultation

## 2019-12-11 NOTE — ASSESSMENT & PLAN NOTE
· With history of recurrent UTIs in setting of renal and pancreatic transplant  Most recently with VRE at recent admission to [facility]  · Presenting now with abdominal pain/nausea and lethargy/confusion  · UA with innumerable WBCs, occasional bacteria  · CT abdomen/pelvis showing colitis  · Suspect viral  · Stool cdiff neg, enteric panel neg, leucocytes pending  · Still having diffuse pain & diarrhea  · ID on board, being monitored off Abx  · Blood cultures neg  · procalcitonin 0 3  · Trend WBC, temperature curve  · Still having diarrhea, Check CMV PCR, if persistent consider checking ova parasites  · ?  Pancreatic insuff

## 2019-12-11 NOTE — PROGRESS NOTES
Progress Note - Infectious Disease   Palomino Loss 53 y o  female MRN: [id number]  Unit/Bed#: -01 Encounter: [id number]      Impression/Recommendations:  1  Acute enterocolitis  Suspect acute viral infection  Other bacterial pathogen although less likely  C  Diff negative  Procalcitonin bland  Clinically stable without sepsis  Abdominal exam benign  Rec:  ? D/C oral vancomycin given negative C  diff  ? Follow up stool enteric PCR  ? Imodium PRN  ? Serial abdominal exams  ? Follow temperatures closely  ? Recheck CBC in AM  ? Supportive care as per the primary service     2  Asymptomatic bacteriuria  Seen on UA  Likely chronically colonized  No active symptoms of UTI  Rec:  ? Continue to follow closely off antibiotics  ? Follow-up urine culture although in absence of symptoms would not treat     3  Recent VRE UTI  Status post 7 days daptomycin with clinical improvement     4   History of renal/pancreatic transplant     Has pancreatic secretions draining in to bladder   On chronic immunosuppression      5   CKD  Stable at baseline creatinine 2 5      6   MM   Recently started on Revlimid      The above impression and plan was discussed in detail with the patient and Dr Zhen Clark      Antibiotics:  PO vancomycin #2    Subjective:  Patient seen on PM rounds  Still feels terrible  Vomited yesterday  Able to take a small amount of PO today  Multiple episodes of watery diarrhea  No dysuria  24 Hour Events:  No documented fevers, chills, sweats  Multiple loose stools noted      Objective:  Vitals:  Temp:  [98 4 °F (36 9 °C)-98 5 °F (36 9 °C)] 98 5 °F (36 9 °C)  HR:  [74-86] 86  Resp:  [18-20] 18  BP: ()/(56-85) 148/64  SpO2:  [94 %-95 %] 94 %  Temp (24hrs), Av 5 °F (36 9 °C), Min:98 4 °F (36 9 °C), Max:98 5 °F (36 9 °C)  Current: Temperature: 98 5 °F (36 9 °C)    Physical Exam:   General:  No acute distress  Psychiatric:  Awake and alert  Pulmonary:  Normal respiratory excursion without accessory muscle use  Abdomen:  Soft, mild distention and diffuse tenderness  Extremities:  No edema  Skin:  No rashes    Lab Results:  I have personally reviewed pertinent labs  Results from last 7 days   Lab Units 12/11/19  0501 12/10/19  0506 12/09/19 2023   POTASSIUM mmol/L 3 1* 3 2* 3 5   CHLORIDE mmol/L 120* 120* 123*   CO2 mmol/L 18* 16* 14*   BUN mg/dL 39* 41* 46*   CREATININE mg/dL 2 24* 2 29* 2 36*   EGFR ml/min/1 73sq m 24 23 23   CALCIUM mg/dL 8 4 8 4 8 7   AST U/L 30  --  19   ALT U/L 21  --  17   ALK PHOS U/L 101  --  106     Results from last 7 days   Lab Units 12/11/19  0501 12/10/19  0502 12/09/19 2023   WBC Thousand/uL 10 49* 10 10 9 58   HEMOGLOBIN g/dL 8 8* 9 2* 9 9*   PLATELETS Thousands/uL 206 184 223     Results from last 7 days   Lab Units 12/10/19  1633 12/09/19 2028 12/09/19 2023 12/09/19  2018   BLOOD CULTURE   --  No Growth at 24 hrs  No Growth at 24 hrs   --    URINE CULTURE   --   --   --  >100,000 cfu/ml Enterococcus species*  1580-5957 cfu/ml Gram-negative cintia- species*   C DIFF TOXIN B  Negative  --   --   --        Imaging Studies:   I have personally reviewed pertinent imaging study reports and images in PACS  EKG, Pathology, and Other Studies:   I have personally reviewed pertinent reports

## 2019-12-11 NOTE — PHYSICAL THERAPY NOTE
Physical Therapy Evaluation     Patient's Name: Cheli Prakash    Admitting Diagnosis  Diarrhea [R19 7]  Dehydration [U97 9]  Metabolic acidosis [C48 6]  Lethargy [R53 83]  Abdominal pain [R10 9]  Renal transplant recipient [Z94 0]  Urinary tract infection without hematuria, site unspecified [N39 0]    Problem List  Patient Active Problem List   Diagnosis    Renal transplant recipient    Chronic kidney disease, stage 4 (severe) ([facility]    Acquired hypothyroidism    Benign hypertension with CKD (chronic kidney disease) stage IV (HCC)    Abdominal pain, possible UTI (urinary tract infection)    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 ([facility] )    Essential hypertension    Anemia    Status post kidney transplant    Immunosuppression ([facility] )    Weakness    Chronic diastolic congestive [facility] failure ([facility]    Urinary incontinence    Calculus, bladder, diverticulum    Hypercalcemia    MGUS (monoclonal gammopathy of unknown significance)    Chronic constipation    Acid reflux disease    Atrial fibrillation ([facility])    Kaiser esophagus    Cataract    Cocaine abuse in remission ([facility] )    Gastric and duodenal disease    Diabetic nephropathy ([facility] )    Diabetic polyneuropathy ([facility])    Retinopathy, diabetic, bilateral ([facility] )    Diastolic dysfunction    Essential and other specified forms of tremor    Failure of pancreas transplant    FELIPE (generalized anxiety disorder)    Hyperlipidemia    Irritable bowel syndrome    Major depressive disorder, recurrent episode, in full remission ([facility] )    Aortic regurgitation    OAB (overactive bladder)    Osteoporosis    Peripheral vascular disease ([facility])    Post-traumatic stress disorder, chronic    Restless legs syndrome    Secondary hyperparathyroidism, renal ([facility] )    Anxiety    Neck strain    Neuropathy in diabetes ([facility] )    Periorbital cellulitis of left eye    History of herpes zoster    Subclavian artery stenosis, left ([facility] )  Abnormal ECG    Impetigo    Multiple myeloma not having achieved remission (HCC)    Rash    Acute renal failure superimposed on stage 4 chronic kidney disease (HCC)    Skin rash    Metabolic acidosis    Ambulatory dysfunction    Acute renal failure (HCC)    Low bicarbonate    Urinary retention    Diarrhea       Past Medical History  Past Medical History:   Diagnosis Date    Abnormal liver function test     Acute kidney injury ([address] )     Acute on chronic congestive heart failure (HCC)     Allergic urticaria     Anemia     Cancer (HCC)     Multiple myeloma    Cervical dysplasia     Cholelithiasis     Chronic diastolic (congestive) heart failure (HCC) 9/18/2017    Diabetes mellitus (HCC)     Previous, controlled with diet    Diabetes mellitus with foot ulcer ([address] )     Disease of thyroid gland     Encephalopathy     Hematuria     + leak est- secondary to UTIs/panc drainage    History of transfusion     Hyperkalemia     Hypertension     Iliotibial band syndrome     Lumbar radiculopathy     Multiple myeloma (HCC)     Multiple myeloma (HCC)     Night blindness     Nonrheumatic aortic (valve) insufficiency     Pneumonia     Renal disorder     Retinopathy     Seborrhea     Seizure ([address] )     Shingles     Sinus tachycardia     B blocker - cardio echo stress test 02 normal/neg LE doppler 2/02 OK and 12/07    Status post simultaneous kidney and pancreas transplant ([facility] [facility] )     Toe amputation status     Trochanteric bursitis        Past Surgical History  Past Surgical History:   Procedure Laterality Date    CATARACT EXTRACTION      CHOLECYSTECTOMY      COLONOSCOPY      two polyps in the rectum removed and biopsied diverticulosis in the sigmoid colon, external hemorrhoiods- Dr Barfield    COMBINED KIDNEY-PANCREAS TRANSPLANT N/A     CT BONE MARROW BIOPSY AND ASPIRATION  4/17/2019    CYSTOSCOPY N/A 10/13/2016    Procedure: CYSTOSCOPY, retrograde pyelogram, biopsy of ureteral polyp; Surgeon: Kai Weber MD;  [facility]: [facility];  Service:    Nadja Toney DILATION AND CURETTAGE OF UTERUS      ESOPHAGOGASTRODUODENOSCOPY N/A 11/20/2017    Procedure: ESOPHAGOGASTRODUODENOSCOPY (EGD); Surgeon: Atiya Shannon DO;  Location:  GI LAB; Service: Gastroenterology    EYE SURGERY      cataracts    FOOT AMPUTATION THROUGH METATARSAL Left     FOOT SURGERY Right     excision of metatarsal heads    HALLUX VALGUS CORRECTION Right     NEPHRECTOMY TRANSPLANTED ORGAN      PANCREATIC TRANSPLANT REMOVAL  1998 12/11/19 1059   Note Type   Note type Eval only   Pain Assessment   Pain Assessment 0-10   Pain Score 8   Pain Type Acute pain   Pain Location Abdomen   Patient's Stated Pain Goal No pain   Hospital Pain Intervention(s) Repositioned; Ambulation/increased activity; Rest   Response to Interventions tolerated   Home Living   Type of [address] One level  (3STE)   Home Equipment Walker;Cane   Additional Comments Pt reports use of cane/RW interchangebly  Per EMR, pt will be moving to [facility] with family in March Prior Function   Level of [place] Independent with ADLs and functional mobility   Lives With Alone   ADL Assistance Independent   IADLs Independent   Vocational On disability   Comments Pt reports she wears briefs at home  Restrictions/Precautions   Weight Bearing Precautions Per Order No   Braces or Orthoses Other (Comment)  (orthotic shoes)   Other Precautions Contact/isolation;Cognitive; Chair Alarm; Bed Alarm; Fall Risk;Pain   General   Family/Caregiver Present No   Cognition   Overall Cognitive Status Impaired   Arousal/Participation Responsive   Attention Attends with cues to redirect   Orientation Level Oriented X4  (grossly to time (unable to state date))   Memory Decreased recall of precautions   Following Commands Follows multistep commands with increased time or repetition   Comments Pt appears to be with slow processing and delayed responses at times     RLE Assessment   RLE Assessment   (functionally 4/5)   LLE Assessment   LLE Assessment   (functionally 4/5)   Coordination   Movements are Fluid and Coordinated 0   Coordination and Movement Description UE tremors at rest; per pt, this is chronic   Bed Mobility   Supine to Sit 5  Supervision   Additional items HOB elevated; Bedrails; Increased time required   Additional Comments Supine in bed upon PT arrival   Pt left on commode with OT present  Transfers   Sit to Stand 4  Minimal assistance  (CGA)   Stand to Sit 4  Minimal assistance  (CGA)   Toilet transfer 4  Minimal assistance  (CGA)   Additional items Commode   Additional Comments Transfers with RW  Ambulation/Elevation   Gait pattern Short stride;Decreased foot clearance  (slow)   Gait Assistance 4  Minimal assist  (CGA)   Additional items Assist x 1   Assistive Device Rolling walker   Distance 4 ft from bed to commode   Stair Management Assistance Not tested   Balance   Static Sitting Good   Dynamic Sitting Fair +   Static Standing Fair   Dynamic Standing Fair -   Ambulatory Fair -   Endurance Deficit   Endurance Deficit Yes   Endurance Deficit Description pain   Activity Tolerance   Activity Tolerance Patient limited by pain   Medical Staff Made Aware OT Elpidio   Nurse Made Aware RN cleared pt to be seen by PT   Assessment   Prognosis Fair   Problem List Decreased strength;Decreased endurance; Impaired balance;Decreased mobility;Pain;Decreased cognition   Assessment Pt seen for high complexity PT evaluation due to decrease in functional mobility status compared to baseline  Pt with active PT eval/treat and ambulate pt orders at this time  Pt is a 53 y o  yo F who presented [facility] the [facility] with abdominal pain, possible UTI on 12/09/19  Per EMR, pt was apparently found confused, naked, covered in urine/feces    Pt  has a past medical history of Abnormal liver function test, Acute kidney injury ([facility] ), Acute on chronic congestive heart failure ([address] ), Allergic urticaria, Anemia, Cancer (HCC), Cervical dysplasia, Cholelithiasis, Chronic diastolic (congestive) heart failure ([facility] ), Diabetes mellitus ([facility] ), Diabetes mellitus with foot ulcer ([facility] ), Disease of thyroid gland, Encephalopathy, Hematuria, History of transfusion, Hyperkalemia, Hypertension, Iliotibial band syndrome, Lumbar radiculopathy, Multiple myeloma ([facility] ), Multiple myeloma ([facility] ), Night blindness, Nonrheumatic aortic (valve) insufficiency, Pneumonia, Renal disorder, Retinopathy, Seborrhea, Seizure ([facility] ), Shingles, Sinus tachycardia, Status post simultaneous kidney and pancreas transplant ([facility] ), Toe amputation status, and Trochanteric bursitis  She also has no past medical history of TIA (transient ischemic attack)  Pt resides alone in one level apartment with 3STE and reports I PTA  Pt presents with decreased strength, balance, endurance, as well as impaired cognition that contribute to limitations in bed mobility, functional mobility, functional transfers  Pt requires supervision for bed mobility, CGA for STS and ambulation 4 ft from bed to commode with RW at this time  Pt found incontinent of bowels in bed upon PT arrival   Pt appears to have slow processing and delayed responses at times  Pt left on commode with OT present with all needs in reach  Pt will benefit from skilled therapy in order to address current impairments and functional limitations  PT to follow pt and recommending home with family support with OPPT vs no needs pending progress, refer to OT for cog  Goals   Patient Goals to have less pain   STG Expiration Date 12/25/19   Short Term Goal #1 1  Pt will demonstrate ability to perform all aspects of bed mobility with I in order to increase independence and decrease burden on caregivers  2  Pt will demonstrate ability to perform functional transfers with Mod I in order to increase independence and decrease burden on caregivers    3  Pt will demonstrate ability to ambulate 200 ft with least restrictive AD with Mod I in order to return to mobility safely  4  Pt will demonstrate ability to negotiate 3 steps with/without HR and Mod I in order to return to household/community mobility safely  5  Pt will demonstrate improved balance by one grade order to decrease risk of falls  6  Pt will increase b/l LE strength by 1 grade in order to increase ease of functional mobility and transfers  PT Treatment Day 0   Plan   Treatment/Interventions Functional transfer training;LE strengthening/ROM; Therapeutic exercise; Endurance training;Elevations; Patient/family training;Equipment eval/education; Bed mobility;Gait training;Spoke to nursing   PT Frequency Other (Comment)  (3-5x/wk)   Recommendation   Recommendation Home with family support; Outpatient PT  (OPPT vs no needs pending progress; refer to OT for cog)   Equipment Recommended Walker   PT - OK to Discharge No  (pending increased ambulation and stairs)   Modified Chuck Scale   Modified [place] Scale 4   Barthel Index   Feeding 10   Bathing 0   Grooming Score 0   Dressing Score 5   Bladder Score 5   Bowels Score 5   Toilet Use Score 5   Transfers (Bed/Chair) Score 10   Mobility (Level Surface) Score 0   Stairs Score 0   Barthel Index Score 40         Taryn Collins, PT, DPT

## 2019-12-11 NOTE — SOCIAL WORK
Initial interview:     Per RN note, pt is a/ox3, slow to respond and forgetful  CM met with the patient's father, at bedside, to review the CM role and discuss possible dc needs  Pt lives alone in a 1st floor Apt in [place], [place]  [address]  Pt is independent, on disability and drives  Pt uses a SPC  Pt has a SPC, RW, BSC and a shower chair  Open to [address] for RN, PT/OT services at time of the admission; per Father, they only just started to do a few visits  Hx of [facility]; "a few years ago"  Pt's father denied a patient history of drug or etoh abuse  Pt's father reported that the patient is on medication prescribed by a Psychiatrist but he doesn't know the diagnoses and stated, "I'm no sure I agree that it is needed"  Father Katerin Parmar stated that he is POA; CM requested copy of document for pt's chart  Prescriptions are filled at [facility]      Main contact:   Father / Neville Peng [phone]  Mother Emmy Easton [phone]  Sister Kim Bejarano [address] :  Pt's father Katerin Parmar reported concerns that the patient is unable to take care of herself, has difficulty with self hygiene and is very weak at this time  CM reviewed process of assessing dc needs and safety thru PT/OT evals and offered to assist with any dc needs  Katerin Parmar stated he would like CM to assist with possible rehab placement  CM reviewed patient choice when alert & oriented and has capacity; Katerin Parmar expressed understanding  CM reviewed d/c planning process including the following: identifying help at home, patient preference for d/c planning needs, Discharge Lounge, [facility] Meds to Bed program, availability of treatment team to discuss questions or concerns patient and/or family may have regarding understanding medications and recognizing signs and symptoms once discharged  CM also encouraged patient to follow up with all recommended appointments after discharge   Patient advised of importance for patient and family to participate in managing patients medical well being

## 2019-12-11 NOTE — PROGRESS NOTES
Progress Note - Johanna Mattson 1965, 53 y o  female MRN: [id number]    Unit/Bed#: -01 Encounter: [id number]    Primary Care Provider: Wilfredo Madrigal MD   Date and time admitted to hospital: 12/9/2019  8:29 PM        Asymptomatic bacteriuria  Assessment & Plan  · Recently completed 7 D course of IV daptomycin for VRE UTI  · Has positive cultures, but this is not the cause of her symptoms  · Monitor off Abx  · ID on board    Metabolic acidosis  Assessment & Plan  · Appears acute on chronic  · S/P kidney and pancreatic transplant in 1998  · continue p o  and trend BMP  · Nephrology consultation    Multiple myeloma not having achieved remission ([facility] )  Assessment & Plan  · Follows with Dr Lizzette Aj as outpatient  · Continue Revlimid at home dosage    Essential hypertension  Assessment & Plan  · Significantly elevated on admission, suspect noncompliance and abdominal pain playing role in this  · Continue home antihypertensive regimen, blood pressure monitoring per protocol  · Was hypotensive this am, hence meds reduced by nephro, norvasc stopped    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 [facility])  Assessment & Plan  Lab Results   Component Value Date    HGBA1C 5 1 09/09/2019       Recent Labs     12/11/19  0617 12/11/19  1037 12/11/19  1200 12/11/19  1608   POCGLU 100 128 121 129       Blood Sugar Average: Last 72 hrs:  · (P) 98 [id number] continue Lantus (in place of [place]) 5 units qHS  · Add SSI with accu-cheks and carb controlled diet  · Initiate hypoglycemia protocol and avoid hypoglycemia    Hypokalemia  Assessment & Plan  GI loss   replete    Chronic kidney disease, stage 4 (severe) ([facility])  Assessment & Plan  · Creatinine at baseline  · Monitor BMP while inpatient  · Avoid nephrotoxins as able, renally dose medications as indicated    Renal transplant recipient  Assessment & Plan  · On chronic immunosuppression with tacrolimus and prednisone  · Unfortunately patient noncompliant with all medications since discharge including these  · Tacrolimus level is 4  · Per Nephrology recommendations - repeat in am 1 hour before dose, goal level 3    * Enterocolitis  Assessment & Plan  · With history of recurrent UTIs in setting of renal and pancreatic transplant  Most recently with VRE at recent admission to [facility]  · Presenting now with abdominal pain/nausea and lethargy/confusion  · UA with innumerable WBCs, occasional bacteria  · CT abdomen/pelvis showing colitis  · Suspect viral  · Stool cdiff neg, enteric panel neg, leucocytes pending  · Still having diffuse pain & diarrhea  · ID on board, being monitored off Abx  · Blood cultures neg  · procalcitonin 0 3  · Trend WBC, temperature curve  · Still having diarrhea, Check CMV PCR, if persistent consider checking ova parasites  · ? Pancreatic insuff        [facility] Progress Note  Patient: Keaton Sheikh 53 y o  female   MRN: [id number]  PCP: Bianka Mattson MD  Unit/Bed#: [facility]- Encounter: [id number]  Date Of Visit: 12/11/19    Assessment:    Principal Problem:    Enterocolitis  Active Problems:    Renal transplant recipient    Chronic kidney disease, stage 4 (severe) ([facility])    Hypokalemia    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 ([facility] )    Essential hypertension    Multiple myeloma not having achieved remission ([facility] )    Metabolic acidosis    Diarrhea    Asymptomatic bacteriuria      Plan:           VTE Pharmacologic Prophylaxis:   Pharmacologic: Heparin  Mechanical VTE Prophylaxis in Place: Yes    Patient Centered Rounds: I have performed bedside rounds with nursing staff today  Discussions with Specialists or Other Care Team Provider: nephro    Education and Discussions with Family / Patient: patient    Time Spent for Care: 30 minutes  More than 50% of total time spent on counseling and coordination of care as described above      Current Length of Stay: 1 day(s)    Current Patient Status: Inpatient   Certification Statement: The patient will continue to require additional inpatient hospital stay due to not readuy    Discharge Plan / Estimated Discharge Date:     Code Status: Level 1 - Full Code      Subjective:   Was very sleepy when I went to see her, briefly opens eyes & talks, still has abdo pain & nausea    Objective:     Vitals:   Temp (24hrs), Av 1 °F (36 7 °C), Min:97 7 °F (36 5 °C), Max:98 5 °F (36 9 °C)    Temp:  [97 7 °F (36 5 °C)-98 5 °F (36 9 °C)] 97 7 °F (36 5 °C)  HR:  [74-86] 86  Resp:  [18-20] 18  BP: ()/(56-85) 163/65  SpO2:  [94 %-95 %] 94 %  Body mass index is 36 87 kg/m²  Input and Output Summary (last 24 hours): Intake/Output Summary (Last 24 hours) at 2019 1658  Last data filed at 2019 0900  Gross per 24 hour   Intake 1866 25 ml   Output 0 ml   Net 1866 25 ml       Physical Exam:     Physical Exam   Constitutional: She is oriented to person, place, and time  She appears well-developed and well-nourished  HENT:   Head: Normocephalic and atraumatic  Mouth/Throat: Oropharynx is clear and moist    Eyes: Conjunctivae are normal    Cardiovascular: Normal rate and regular rhythm  Exam reveals no friction rub  No murmur heard  Pulmonary/Chest: Effort normal and breath sounds normal  No stridor  No respiratory distress  Abdominal: Soft  She exhibits no distension  There is no tenderness  Neurological: She is alert and oriented to person, place, and time  Vitals reviewed  Additional Data:     Labs:    Results from last 7 days   Lab Units 19  0501  193   WBC Thousand/uL 10 49*   < > 9 58   HEMOGLOBIN g/dL 8 8*   < > 9 9*   HEMATOCRIT % 27 8*   < > 30 8*   PLATELETS Thousands/uL 206   < > 223   LYMPHO PCT %  --   --  4*   MONO PCT %  --   --  2*   EOS PCT %  --   --  0    < > = values in this interval not displayed       Results from last 7 days   Lab Units 19  0501   POTASSIUM mmol/L 3 1*   CHLORIDE mmol/L 120*   CO2 mmol/L 18*   BUN mg/dL 39*   CREATININE mg/dL 2 24*   CALCIUM mg/dL 8 4   ALK PHOS U/L 101   ALT U/L 21   AST U/L 30     Results from last 7 days   Lab Units 12/09/19 2023   INR  1 21*       * I Have Reviewed All Lab Data Listed Above  * Additional Pertinent Lab Tests Reviewed: All Labs Within Last 24 Hours Reviewed    Imaging:    Imaging Reports Reviewed Today Include:   Imaging Personally Reviewed by Myself Includes:      Recent Cultures (last 7 days):     Results from last 7 days   Lab Units 12/10/19  1633 12/09/19 2028 12/09/19 2023 12/09/19 2018   BLOOD CULTURE   --  No Growth at 24 hrs   No Growth at 24 hrs   --    URINE CULTURE   --   --   --  >100,000 cfu/ml Enterococcus species*  8895-9840 cfu/ml Gram-negative cintia- species*   C DIFF TOXIN B  Negative  --   --   --        Last 24 Hours Medication List:     Current Facility-Administered Medications:  acetaminophen 650 mg Oral Q6H PRN Kourtney Ortiz PA-C    aluminum-magnesium hydroxide-simethicone 30 mL Oral Q4H PRN Kourtney Ortiz PA-C    ARIPiprazole 30 mg Oral HS Renovo Massy, DO    aspirin 81 mg Oral Daily Renovo Massy, DO    busPIRone 5 mg Oral BID Renovo Massy, DO    cholecalciferol 3,000 Units Oral Daily Renovo Massy, DO    cloNIDine 0 2 mg Oral Q8H [address] Brendan Spurling, MD    doxazosin 2 mg Oral HS Brendan Spurling, MD    DULoxetine 60 mg Oral BID Renovo Massy, DO    ferrous sulfate 325 mg Oral Daily With Breakfast Renovo Massy, DO    folic acid 9,853 mcg Oral Daily Renovo Massy, DO    heparin (porcine) 5,000 Units Subcutaneous [facility] Renovo Massy, DO    hydrALAZINE 50 mg Oral Q8H [address] Brendan Spurling, MD    insulin lispro 1-5 Units Subcutaneous HS Renovo Massy, DO    insulin lispro 1-6 Units Subcutaneous TID AC MER Mckeon    levothyroxine 125 mcg Oral Early Morning Renovo Massy, DO    loperamide 2 mg Oral 4x Daily PRN Dennis Campos MD    LORazepam 2 mg Oral TID PRN Brii Slater, DO    metoprolol tartrate 50 mg Oral Q12H [address] Freeda Flavin, DO    multi-electrolyte 75 mL/hr Intravenous Continuous Shantal Sun MD Last Rate: 75 mL/hr (12/11/19 0349)   ondansetron 4 mg Intravenous Q4H PRN Mak Mata MD    potassium chloride 40 mEq Oral BID Fred Hendrickson MD    pravastatin 80 mg Oral Daily Freeda Flavin, DO    predniSONE 5 mg Oral Daily Freeda Flavin, DO    rOPINIRole 0 25 mg Oral BID Freeda Flavin, DO    saccharomyces boulardii 250 mg Oral BID Freeda Flavin, DO    sertraline 200 mg Oral Daily Freeda Flavin, DO    sevelamer 800 mg Oral TID With Meals Freeda Flavin, DO    sodium bicarbonate 1,300 mg Oral TID Freeda Flavin, DO    tacrolimus 3 mg Oral Q12H [facility]         Today, Patient Was Seen By: Fred Hendrickson MD    ** Please Note: This note has been constructed using a voice recognition system   **

## 2019-12-11 NOTE — PLAN OF CARE
Problem: PHYSICAL THERAPY ADULT  Goal: Performs mobility at highest level of function for planned discharge setting  See evaluation for individualized goals  Description  Treatment/Interventions: Functional transfer training, LE strengthening/ROM, Therapeutic exercise, Endurance training, Elevations, Patient/family training, Equipment eval/education, Bed mobility, Gait training, Spoke to nursing  Equipment Recommended: Ruben Emerson       See flowsheet documentation for full assessment, interventions and recommendations  Note:   Prognosis: Fair  Problem List: Decreased strength, Decreased endurance, Impaired balance, Decreased mobility, Pain, Decreased cognition  Assessment: Pt seen for high complexity PT evaluation due to decrease in functional mobility status compared to baseline  Pt with active PT eval/treat and ambulate pt orders at this time  Pt is a 53 y o  yo F who presented to [facility] with abdominal pain, possible UTI on 12/09/19  Per EMR, pt was apparently found confused, naked, covered in urine/feces  Pt  has a past medical history of Abnormal liver function test, Acute kidney injury ([address] ), Acute on chronic congestive heart failure ([address] ), Allergic urticaria, Anemia, Cancer ([address] ), Cervical dysplasia, Cholelithiasis, Chronic diastolic (congestive) heart failure ([address] ), Diabetes mellitus ([address] ), Diabetes mellitus with foot ulcer ([address] ), Disease of thyroid gland, Encephalopathy, Hematuria, History of transfusion, Hyperkalemia, Hypertension, Iliotibial band syndrome, Lumbar radiculopathy, Multiple myeloma ([address] ), Multiple myeloma ([address] ), Night blindness, Nonrheumatic aortic (valve) insufficiency, Pneumonia, Renal disorder, Retinopathy, Seborrhea, Seizure ([address] ), Shingles, Sinus tachycardia, Status post simultaneous kidney and pancreas transplant ([address] ), Toe amputation status, and Trochanteric bursitis  She also has no past medical history of TIA (transient ischemic attack)    Pt resides alone in one level apartment with 3STE and reports I PTA  Pt presents with decreased strength, balance, endurance, as well as impaired cognition that contribute to limitations in bed mobility, functional mobility, functional transfers  Pt requires supervision for bed mobility, CGA for STS and ambulation 4 ft from bed to commode with RW at this time  Pt found incontinent of bowels in bed upon PT arrival   Pt appears to have slow processing and delayed responses at times  Pt left on commode with OT present with all needs in reach  Pt will benefit from skilled therapy in order to address current impairments and functional limitations  PT to follow pt and recommending home with family support with OPPT vs no needs pending progress, refer to OT for cog  Recommendation: Home with family support, Outpatient PT(OPPT vs no needs pending progress; refer to OT for cog)     PT - OK to Discharge: No(pending increased ambulation and stairs)    See flowsheet documentation for full assessment

## 2019-12-11 NOTE — PLAN OF CARE
Problem: OCCUPATIONAL THERAPY ADULT  Goal: Performs self-care activities at highest level of function for planned discharge setting  See evaluation for individualized goals  Description  Treatment Interventions: ADL retraining, Functional transfer training, Endurance training, Cognitive reorientation, Patient/family training, Equipment evaluation/education, Compensatory technique education, Continued evaluation, Energy conservation, Activityengagement          See flowsheet documentation for full assessment, interventions and recommendations  Note:   Limitation: Decreased ADL status, Decreased cognition, Decreased endurance, Decreased Safe judgement during ADL, Decreased high-level ADLs  Prognosis: Fair  Assessment: Pt is a 53 y o  female admitted to [facility] on 12/9/2019 w/ enterocolitis  Comorbidities include a h/o renal transplant, type 1 DM, osteoporosis, peripheral vascular disease, CHF, multiple myeloma, chronic kidney disease, and essential hypertension  Pt with active OT orders and ambulate  orders  Pt resides alone in a 1 floor apartment with 3 JESSI  Pt reports that her family are able to provide assistance as needed upon d/c  Pt was I w/  ADLS and IADLS, (+) drove, & required use of cane PTA  Currently pt is min A for functional mobility and functional transfers, supervision for UB ADLS, and min A for LB ADLS  Pt is limited at this time 2*: pain, endurance, activity tolerance, functional mobility, forward functional reach, unsupportive home environment, decreased I w/ ADLS/IADLS, cognitive impairments and decreased safety awareness  The following Occupational Performance Areas to address include: bathing/shower, toilet hygiene, dressing, functional mobility and clothing management  Pt scored overall  40/100 on the Barthel Index  Based on the aforementioned OT evaluation, functional performance deficits, and assessments, pt has been identified as a high complexity evaluation   From OT standpoint, anticipate d/c home with family support and home OT pending clarification of available support and progress  Outpatient OT recommended for fitness to drive  Pt to continue to benefit from acute immediate OT services to address the following goals 3-5x/week to  w/in 7-10 days:        OT Discharge Recommendation: Outpatient OT(OT pending clarification of available support and progress )  OT - OK to Discharge: (S) No

## 2019-12-11 NOTE — PROGRESS NOTES
NEPHROLOGY PROGRESS NOTE   Robert Stevenson 53 y o  female MRN: [id number]  Unit/Bed#: -01 Encounter: [id number]  Reason for Consult: Assistance with Met Acidosis as well as Recurrent UTI with h/o left kidney transplant    ASSESSMENT and PLAN:    Robert Stevenson is a 53 y o  female with a PMHx of CKD stage 4, s/p kidney/pancreas transplant in 1998, recurrent UTIs, T1DM, HTN, Chronic Metabolic Acidosis, MM, and depression who was admitted to [facility] after presenting with new onset confusion and diffuse abdominal pain in the setting of poor PO intake and significant diarrhea   A renal consultation is requested today for assistance in the management of acute on chronic metabolic acidosis and UTI      1) Diffuse Abdominal pain (possibly UTI vs Colitis)  - possibly secondary to UTI or Colitis  - patient has a h/o recurrent UTIs in the past year ("too many to count"), quite possibly due to immunosuppressive therapy  - pain is worse over allograft site but due to stable Cr, UTI is much higher on the DDx than rejection  - Recently was admitted to [facility] for VRE UTI for which she was treated with a complete course of Daptomycin and was discharged on 12/6/2019  - Continued frequent diarrhea  - Has diffuse abdominal pain more significant in the LLQ  - + CVA tenderness in the right and left (more significant over the right kidney)  - + Rigors, - chills, currently Afebrile  - UA in the ED positive for innumerbale WBCs, RBCs, as well as occasional bacteria  - Has a history of Urinary Retention which she was supposed to be managing with Straight Catheterizations, but has not been doing so --> this significantly increases her risk of infection especially in the allograft  - Ucx came back positive for VRE, still unclear whether the source of the infection is the kidneys or if it is something in the GI tract  - PVRs so far showing no urinary retention  - Patient now complaining of urinary incontinence  - She is currently on Oral Vancomycin for C  Diff PPx  - ID is on board and will be giving recommendations  - No signs of EDUARDO at the moment as Cr is at baseline     Plan  - Trend WBCs  - Check PVRs every shift to assess for urinary retention, especially in the setting of new complaint of urinary incontinence  If there are retention issues, consider getting Urology on board to assess need for intervention  - Follow up C   Diff as well as Stool PCR (including CMV)  - Follow ID recommendations as might have to switch back to Daptomycin in the setting of recurrent VRE  - Follow up Blood cultures     2) Metabolic Acidosis  - seems to be acute on chronic  - likely acute due to recent significant diarrhea and poor PO intake, without nausea and vomiting  - could be due to 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 and associated recent hypokalemia  - Possibly due to kidney damage from recurrent UTIs, but all recent US have been negative hydronephrosis and visible kidney scarring  - has not been taking any nephrotoxic agents other than Tacrolimus and Recent Daptomycin  - Mental status is now back to baseline of AAOx3  - has history of chronic metabolic acidosis in the setting of kidney transplant  - Non-AG Met Acid (AG is now 8), Serum Bicarb at 18 and improving with current Isolyte and oral bicarb regiment     Plan  - Urine electrolytes to determine UAG  - Follow up Tacrolimus level, especially in the setting of diarrhea, if level comes back elevated (>4) it will need to be temporarily reduced  - continue with oral bicarbonate tablets as well as isolyte and replete K+ as needed with goal of >3 1 in setting of continued diarrhea     3) AMS and Chronic Fatigue  - secondary to UTI vs  Metabolic Acidosis  - could even be due to development of transplant rejection as patient has abdominal pain over the transplant site and chronic fatigue, as well as AMS and Diarrhea  - Kidneys have been atrophic for a long time     Plan  - continue treatment as above  - monitor mental status for improvement  - consider biopsy of kidney as last resort to look for rejection if all other treatments as above do not produce favorable results      4) CKD 4  - Cr is at baseline which is in 2 0 - 2 5  - Current Cr is 2 29  - No signs right now of Acute on Chronic kidney injury  - Patient states to have regular urine output but not yet recorded in system  - following Dr Román Mcneal as an outpatient  - 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 but has been stable for years     Plan  - Avoid nephrotoxins  - Check BMP regulalry  - continue treatment as above    5) Hypokalemia  - K+ continues to remain low, at 3 1 today  - likely secondary to continued diarrhea    Plan  - replete with one time dose of 40meq now and then one dose of 20meq in the evening  - goal >3 5    6) Diarrhea  - continued loose non-bloody diarrhea  - each diarrheal episode not producing high volume of stool as per nursing team  - C  Diff study pending    Plan  - follow up C  Diff  - consider possible consult to GI to assess issue of chronic diarrhea if there is absolutely no improvement after ID recommended treatment     Manage other chronic problems as per primary team      SUBJECTIVE / INTERVAL HISTORY:    Patient was seen and examined at bedside  Mental Status improved now AAOx3  No acute events overnight  Urine output still not recorded but patient does endorse consistent urination with actual no incontinence  Was started on oral vancomycin overnight for possible C  Diff infection  Ucx came back positive for VRE   Stool studies pending  PVRs overnight showing no retention    Patient endorses very mild improvement of symptoms from yesterday  She is not shaking as much and her diffuse abdominal pain has reduced from a 10/10 to 7/10  She continues to complain of frequent non-bloody diarrheal episodes   Patient now endorsing urinary incontinence ("peeing myself") without any evidence of gross hematuria, but she does state that she is urinating regularly  OBJECTIVE:  Current Weight: Weight - Scale: 110 kg (242 lb 8 1 oz)  Vitals:    12/10/19 2137 12/10/19 2328 12/11/19 0600 12/11/19 0628   BP: 127/85 156/64  (!) 178/73   BP Location: Left arm Left arm     Pulse: 84 74  86   Resp: 20 20  20   Temp: 98 5 °F (36 9 °C)      TempSrc: Oral      SpO2: 95% 94%     Weight:   110 kg (242 lb 8 1 oz)    Height:           Intake/Output Summary (Last 24 hours) at 12/11/2019 0915  Last data filed at 12/11/2019 0900  Gross per 24 hour   Intake 1552 5 ml   Output    Net 1552 5 ml       Review of Systems:    No fevers, chills, chest pain, SOB, ROD, oliguria, dysuria, urgency, frequency, hematuria, nausea, vomiting, skin changes    Positive for diffuse abdominal pain, LBP bilaterally, non-bloody diarrhea, and urinary incontinence    Physical Exam   Constitutional: She is oriented to person, place, and time  She appears well-developed and well-nourished  She appears distressed  Continues to have diffuse shaking (improved from yesterday)   HENT:   Head: Normocephalic and atraumatic  Eyes: Pupils are equal, round, and reactive to light  Conjunctivae and EOM are normal    Neck: Normal range of motion  Neck supple  No JVD present  No thyromegaly present  Cardiovascular: Normal rate, regular rhythm, normal heart sounds and intact distal pulses  Exam reveals no gallop and no friction rub  No murmur heard  Pulmonary/Chest: Effort normal and breath sounds normal  No respiratory distress  She has no wheezes  She has no rales  Abdominal: Soft  Bowel sounds are normal  She exhibits distension and mass (Left Kidney Transplant palpable in LLQ)  There is tenderness (Diffuse tenderness that is now 7/10 from 10/10 yesterday  Tenderness most prominent over allograft site in LLQ)  There is no guarding     Genitourinary:   Genitourinary Comments: Consistently urinating but now with urinary incontinence  No catheter in place at the moment Musculoskeletal: Normal range of motion  She exhibits no edema  Improved upper extremity strength, now 4/5  Improved  strength bilaterally  No LE Edema   Neurological: She is alert and oriented to person, place, and time  She displays normal reflexes  No cranial nerve deficit or sensory deficit  Coordination normal    Skin: Skin is warm and dry  She is not diaphoretic  No erythema  Psychiatric: She has a normal mood and affect  Her behavior is normal  Judgment and thought content normal    Nursing note and vitals reviewed           Medications:    Current Facility-Administered Medications:     acetaminophen (TYLENOL) tablet 650 mg, 650 mg, Oral, Q6H PRN, Kourtney Ortiz PA-C, 650 mg at 12/11/19 0149    aluminum-magnesium hydroxide-simethicone (MYLANTA) 200-200-20 mg/5 mL oral suspension 30 mL, 30 mL, Oral, Q4H PRN, Kourtney Ortiz PA-C, 30 mL at 12/11/19 0148    amLODIPine (NORVASC) tablet 10 mg, 10 mg, Oral, QAM, Eda Chatman MD, 10 mg at 12/11/19 0823    ARIPiprazole (ABILIFY) tablet 30 mg, 30 mg, Oral, HS, Tereso Landing, DO, 30 mg at 12/10/19 2130    aspirin chewable tablet 81 mg, 81 mg, Oral, Daily, Tereso Landing, DO, 81 mg at 12/11/19 6059    busPIRone (BUSPAR) tablet 5 mg, 5 mg, Oral, BID, Tereso Landing, DO, 5 mg at 12/11/19 6225    cholecalciferol (VITAMIN D3) tablet 3,000 Units, 3,000 Units, Oral, Daily, Tereso Landing, DO, 3,000 Units at 12/11/19 8971    cloNIDine (CATAPRES) tablet 0 3 mg, 0 3 mg, Oral, Q8H [address], Tereso Landing, DO, 0 3 mg at 12/11/19 7826    doxazosin (CARDURA) tablet 4 mg, 4 mg, Oral, HS, Eda Chatman MD    DULoxetine (CYMBALTA) delayed release capsule 60 mg, 60 mg, Oral, BID, Tereso Landing, DO, 60 mg at 12/11/19 2237    ferrous sulfate tablet 325 mg, 325 mg, Oral, Daily With Breakfast, Tereso Landing, DO, 325 mg at 87/93/62 4967    folic acid (FOLVITE) tablet 1,000 mcg, 1,000 mcg, Oral, Daily, Tereso Adams DO, 1,000 mcg at 12/11/19 0823    heparin (porcine) subcutaneous injection 5,000 Units, 5,000 Units, Subcutaneous, Q8H [facility], 5,000 Units at 12/11/19 8138 **AND** [CANCELED] Platelet count, , , Once, Esha Melissa DO    hydrALAZINE (APRESOLINE) tablet 100 mg, 100 mg, Oral, Q8H [facility], Shantal Sun MD, 100 mg at 12/11/19 0630    insulin lispro (HumaLOG) 100 units/mL subcutaneous injection 1-5 Units, 1-5 Units, Subcutaneous, HS, Esha Melissa, DO    insulin lispro (HumaLOG) 100 units/mL subcutaneous injection 1-6 Units, 1-6 Units, Subcutaneous, TID AC **AND** Fingerstick Glucose (POCT), , , Q4H, MER Mckeon    levothyroxine tablet 125 mcg, 125 mcg, Oral, Early Morning, Esha Melissa DO, 125 mcg at 12/11/19 8563    LORazepam (ATIVAN) tablet 2 mg, 2 mg, Oral, TID PRN, Esha Melissa, DO, 2 mg at 12/11/19 0149    metoprolol tartrate (LOPRESSOR) tablet 50 mg, 50 mg, Oral, Q12H [facility], Esha Melissa DO, 50 mg at 12/11/19 0824    multi-electrolyte (PLASMALYTE-A/ISOLYTE-S PH 7 4) IV solution, 75 mL/hr, Intravenous, Continuous, Shantal Sun MD, Last Rate: 75 mL/hr at 12/11/19 0349, 75 mL/hr at 12/11/19 0349    ondansetron (ZOFRAN) injection 4 mg, 4 mg, Intravenous, Q4H PRN, Mak Mata MD, 4 mg at 12/10/19 1529    potassium chloride (K-DUR,KLOR-CON) CR tablet 40 mEq, 40 mEq, Oral, BID, Melissa Lyle MD    pravastatin (PRAVACHOL) tablet 80 mg, 80 mg, Oral, Daily, Esha Melissa DO, 80 mg at 12/11/19 1501    predniSONE tablet 5 mg, 5 mg, Oral, Daily, Esha Melissa DO, 5 mg at 12/11/19 6877    rOPINIRole (REQUIP) tablet 0 25 mg, 0 25 mg, Oral, BID, Freeda Flavin, DO, 0 25 mg at 12/11/19 8135    saccharomyces boulardii (FLORASTOR) capsule 250 mg, 250 mg, Oral, BID, Freeda Flavin, DO, 250 mg at 12/11/19 2416    sertraline (ZOLOFT) tablet 200 mg, 200 mg, Oral, Daily, Freeda Flavin, DO, 200 mg at 12/11/19 1956    sevelamer (RENAGEL) tablet 800 mg, 800 mg, Oral, TID With Meals, Freeda Flavin, DO, 800 mg at 12/11/19 2044    sodium bicarbonate tablet 1,300 mg, 1,300 mg, Oral, TID, Leilani Locks, DO, 1,300 mg at 12/11/19 0823    tacrolimus (PROGRAF) capsule 3 mg, 3 mg, Oral, Q12H [address], Leilani Locks, DO, 3 mg at 12/11/19 0825    vancomycin (VANCOCIN) oral solution 125 mg, 125 mg, Oral, Q6H [address], Erin Rowe MD, 125 mg at 12/11/19 3356    Laboratory Results:  Results from last 7 days   Lab Units 12/11/19  0501 12/10/19  0506 12/10/19  0502 12/09/19  2023 12/06/19  0601 12/05/19  0546   WBC Thousand/uL 10 49*  --  10 10 9 58 5 00 5 08   HEMOGLOBIN g/dL 8 8*  --  9 2* 9 9* 8 3* 6 9*   HEMATOCRIT % 27 8*  --  28 4* 30 8* 25 4* 22 3*   PLATELETS Thousands/uL 206  --  184 223 152 136*   POTASSIUM mmol/L 3 1* 3 2*  --  3 5 3 7 3 4*   CHLORIDE mmol/L 120* 120*  --  123* 109* 106   CO2 mmol/L 18* 16*  --  14* 17* 17*   BUN mg/dL 39* 41*  --  46* 51* 57*   CREATININE mg/dL 2 24* 2 29*  --  2 36* 2 71* 3 04*   CALCIUM mg/dL 8 4 8 4  --  8 7 8 3 8 5   MAGNESIUM mg/dL  --  1 8  --   --   --   --

## 2019-12-11 NOTE — ASSESSMENT & PLAN NOTE
Lab Results   Component Value Date    HGBA1C 5 1 09/09/2019       Recent Labs     12/11/19  0617 12/11/19  1037 12/11/19  1200 12/11/19  1608   POCGLU 100 128 121 129       Blood Sugar Average: Last 72 hrs:  · (P) 75 [id number] continue Lantus (in place of [place]) 5 units qHS  · Add SSI with accu-cheks and carb controlled diet  · Initiate hypoglycemia protocol and avoid hypoglycemia

## 2019-12-11 NOTE — ASSESSMENT & PLAN NOTE
· Significantly elevated on admission, suspect noncompliance and abdominal pain playing role in this  · Continue home antihypertensive regimen, blood pressure monitoring per protocol  · Was hypotensive this am, hence meds reduced by nephro, norvasc stopped

## 2019-12-11 NOTE — ASSESSMENT & PLAN NOTE
· On chronic immunosuppression with tacrolimus and prednisone  · Unfortunately patient noncompliant with all medications since discharge including these  · Tacrolimus level is 4  · Per Nephrology recommendations - repeat in am 1 hour before dose, goal level 3

## 2019-12-11 NOTE — ASSESSMENT & PLAN NOTE
· Recently completed 7 D course of IV daptomycin for VRE UTI  · Has positive cultures, but this is not the cause of her symptoms  · Monitor off Abx  · ID on board

## 2019-12-11 NOTE — OCCUPATIONAL THERAPY NOTE
[address]  Evaluation     Patient Name: Keaton Sheikh  CZGVA'K Date: 12/11/2019  Problem List  Principal Problem:    Enterocolitis  Active Problems:    Renal transplant recipient    Chronic kidney disease, stage 4 (severe) (HCC)    Hypokalemia    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 )    Essential hypertension    Multiple myeloma not having achieved remission ([facility] )    Metabolic acidosis    Diarrhea    Asymptomatic bacteriuria    Past Medical History  Past Medical History:   Diagnosis Date    Abnormal liver function test     Acute kidney injury ([facility] )     Acute on chronic congestive heart failure (HCC)     Allergic urticaria     Anemia     Cancer (HCC)     Multiple myeloma    Cervical dysplasia     Cholelithiasis     Chronic diastolic (congestive) heart failure ([facility] ) 9/18/2017    Diabetes mellitus (HCC)     Previous, controlled with diet    Diabetes mellitus with foot ulcer ([facility] )     Disease of thyroid gland     Encephalopathy     Hematuria     + leak est- secondary to UTIs/panc drainage    History of transfusion     Hyperkalemia     Hypertension     Iliotibial band syndrome     Lumbar radiculopathy     Multiple myeloma (HCC)     Multiple myeloma (HCC)     Night blindness     Nonrheumatic aortic (valve) insufficiency     Pneumonia     Renal disorder     Retinopathy     Seborrhea     Seizure ([address] )     Shingles     Sinus tachycardia     B blocker - cardio echo stress test 02 normal/neg LE doppler 2/02 OK and 12/07    Status post simultaneous kidney and pancreas transplant ([facility] )     Toe amputation status     Trochanteric bursitis      Past Surgical History  Past Surgical History:   Procedure Laterality Date    CATARACT EXTRACTION      CHOLECYSTECTOMY      COLONOSCOPY      two polyps in the rectum removed and biopsied diverticulosis in the sigmoid colon, external hemorrhoiods- Dr Barfield    COMBINED KIDNEY-PANCREAS TRANSPLANT N/A     CT BONE MARROW BIOPSY AND ASPIRATION  4/17/2019    CYSTOSCOPY N/A 10/13/2016    Procedure: CYSTOSCOPY, retrograde pyelogram, biopsy of ureteral polyp; Surgeon: Alessio Kay MD;  Location: [facility];  Service:    Román Lamb DILATION AND CURETTAGE OF UTERUS      ESOPHAGOGASTRODUODENOSCOPY N/A 11/20/2017    Procedure: ESOPHAGOGASTRODUODENOSCOPY (EGD); Surgeon: Laura Villela DO;  Location: [facility]; Service: Gastroenterology    EYE SURGERY      cataracts    FOOT AMPUTATION THROUGH METATARSAL Left     FOOT SURGERY Right     excision of metatarsal heads    HALLUX VALGUS CORRECTION Right     NEPHRECTOMY TRANSPLANTED ORGAN      PANCREATIC TRANSPLANT REMOVAL  1998 12/11/19 1103   Note Type   Note type Eval only   Restrictions/Precautions   Weight Bearing Precautions Per Order No   Braces or Orthoses Other (Comment)  (orthotic shoes )   Other Precautions Contact/isolation;Cognitive; Chair Alarm; Bed Alarm; Fall Risk;Pain   Pain Assessment   Pain Assessment 0-10   Pain Score 8   Pain Type Acute pain   Pain Location Abdomen   Hospital Pain Intervention(s) Repositioned; Ambulation/increased activity; Emotional support   Response to Interventions Tolerated    Home Living   Type of Home Apartment   Home Layout One level   Bathroom Shower/Tub Tub/shower unit   Bathroom Toilet Standard   Bathroom Equipment Grab bars in shower; Shower chair;Commode   Bathroom Accessibility Accessible   Home Equipment Walker;Cane   Additional Comments Pt lives in a one level apartment with 3 JESSI  Per EMR, pt is moving to [facility] with family in March Prior Function   Level of [place] Independent with ADLs and functional mobility   Lives With Alone   Receives Help From Family   ADL Assistance Independent   IADLs Independent   Vocational On disability   Lifestyle   Autonomy Pt reports being I with ADLS, IADLS and use of cane for ambulation PTA    Reciprocal Relationships Pt lives alone and reports that her family can assist as needed upon d/c    Service to Others Pt reports on disability   Intrinsic Gratification Pt reports enjoying crafts especially making jewelry and rugs   Psychosocial   Psychosocial (WDL) WDL   ADL   Where Assessed Edge of bed   Eating Assistance 5  Supervision/Setup   Grooming Assistance 5  Supervision/Setup   UB Bathing Assistance 5  Supervision/Setup   LB [address] 4  Minimal [address] 5  Supervision/Setup   LB Dressing Assistance 4  [address] Deficit Perineal hygiene   Bed Mobility   Supine to Sit 5  Supervision   Additional items Assist x 1;HOB elevated; Increased time required   Sit to Supine 5  Supervision   Additional items HOB elevated; Increased time required   Transfers   Sit to Stand 4  Minimal assistance   Additional items Assist x 1; Increased time required;Verbal cues   Stand to Sit 4  Minimal assistance   Additional items Assist x 1; Increased time required;Verbal cues   Toilet transfer 4  Minimal assistance   Additional items Commode; Increased time required   Functional Mobility   Functional Mobility 4  Minimal assistance   Additional Comments Pt demonstrated short household mobility in room with RW   Additional items Rolling walker   Balance   Static Sitting Good   Dynamic Sitting Fair +   Static Standing Fair   Dynamic Standing Fair -   Ambulatory Fair -   Activity Tolerance   Activity Tolerance Patient limited by fatigue;Patient limited by pain   Medical Staff Made Aware PT Genie Hi   Nurse Made Aware RN confirmed okay to see pt    Cognition   Overall Cognitive Status Impaired   Arousal/Participation Lethargic;Cooperative   Attention Attends with cues to redirect   Orientation Level Oriented X4   Memory Decreased recall of precautions   Following Commands Follows one step commands with increased time or repetition   Comments Pt requires increased time for processing and to respond to questions   ACLS can not be performed at this time due to contact isolation precautions  Pt completed a MOCA on 9/11 and scored a 20/30 indicating mild cognitive impairment  Repeat MOCA is indicated, pt refusing this session saying she would complete it tomorrow  Strongly recommend fitness to drive evaluation and increased support at home, as there are concerns about pt's ability to care for herself safely  Will re-attempt MOCA  Assessment   Limitation Decreased ADL status; Decreased cognition;Decreased endurance;Decreased Safe judgement during ADL;Decreased high-level ADLs   Prognosis Fair   Assessment Pt is a 53 y o  female admitted to [facility] on 12/9/2019 w/ enterocolitis  Comorbidities include a h/o renal transplant, type 1 DM, osteoporosis, peripheral vascular disease, CHF, multiple myeloma, chronic kidney disease, and essential hypertension  Pt with active OT orders and ambulate  orders  Pt resides alone in a 1 floor apartment with 3 JESSI  Pt reports that her family are able to provide assistance as needed upon d/c  Pt was I w/  ADLS and IADLS, (+) drove, & required use of cane PTA  Currently pt is min A for functional mobility and functional transfers, supervision for UB ADLS, and min A for LB ADLS  Pt is limited at this time 2*: pain, endurance, activity tolerance, functional mobility, forward functional reach, unsupportive home environment, decreased I w/ ADLS/IADLS, cognitive impairments and decreased safety awareness  The following Occupational Performance Areas to address include: bathing/shower, toilet hygiene, dressing, functional mobility and clothing management  Pt scored overall  40/100 on the Barthel Index  Based on the aforementioned OT evaluation, functional performance deficits, and assessments, pt has been identified as a high complexity evaluation  From OT standpoint, anticipate d/c home with family support and home OT pending clarification of available support and progress  Outpatient OT recommended for fitness to drive   Pt to continue to benefit from acute immediate OT services to address the following goals 3-5x/week to  w/in 7-10 days: Velna Iago Goals   Patient Goals To have less pain    LTG Time Frame 7-10   Long Term Goal #1 See goals below    Plan   Treatment Interventions ADL retraining;Functional transfer training; Endurance training;Cognitive reorientation;Patient/family training;Equipment evaluation/education; Compensatory technique education;Continued evaluation; Energy conservation; Activityengagement   Goal Expiration Date 19   OT Frequency 3-5x/wk   Recommendation   OT Discharge Recommendation Outpatient OT  (OT for fitness to drive, home with family support pending clarification of available support and progress )   OT - OK to Discharge No (not if returning home alone)   Barthel Index   Feeding 10   Bathing 0   Grooming Score 0   Dressing Score 5   Bladder Score 5   Bowels Score 5   Toilet Use Score 5   Transfers (Bed/Chair) Score 10   Mobility (Level Surface) Score 0   Stairs Score 0   Barthel Index Score 40   Modified [place] Scale   Modified [place] Scale 4      GOALS    1) Pt will increase activity tolerance to G for 30 min txment sessions    2) Pt will complete UB/LB dressing/self care w/ mod I using adaptive device and DME as needed    3) Pt will complete bathing w/ Mod I w/ use of AE and DME as needed    4) Pt will complete toileting w/ mod I w/ G hygiene/thoroughness using DME as needed    5) Pt will improve functional transfers to Mod I on/off all surfaces using DME as needed w/ G balance/safety     6) Pt will improve functional mobility during ADL/IADL/leisure tasks to Mod I using DME as needed w/ G balance/safety     7) Pt will participate in simulated IADL management task with DME as needed to increase independence to  w/ G safety and endurance    8) Pt will demonstrate G carryover of pt/caregiver education and training as appropriate      9) Pt will engage in ongoing cognitive assessment w/ G participation to assist w/ safe d/c planning/recommendations    TANJA Herrera, OTR/L

## 2019-12-12 LAB
ALBUMIN SERPL BCP-MCNC: 2.2 G/DL (ref 3.5–5)
ALP SERPL-CCNC: 91 U/L (ref 46–116)
ALT SERPL W P-5'-P-CCNC: 20 U/L (ref 12–78)
ANION GAP SERPL CALCULATED.3IONS-SCNC: 7 MMOL/L (ref 4–13)
ANION GAP SERPL CALCULATED.3IONS-SCNC: 9 MMOL/L (ref 4–13)
AST SERPL W P-5'-P-CCNC: 19 U/L (ref 5–45)
BACTERIA UR CULT: ABNORMAL
BACTERIA UR CULT: ABNORMAL
BILIRUB SERPL-MCNC: 0.34 MG/DL (ref 0.2–1)
BUN SERPL-MCNC: 39 MG/DL (ref 5–25)
BUN SERPL-MCNC: 39 MG/DL (ref 5–25)
CALCIUM SERPL-MCNC: 8.2 MG/DL (ref 8.3–10.1)
CALCIUM SERPL-MCNC: 8.3 MG/DL (ref 8.3–10.1)
CHLORIDE SERPL-SCNC: 116 MMOL/L (ref 100–108)
CHLORIDE SERPL-SCNC: 122 MMOL/L (ref 100–108)
CO2 SERPL-SCNC: 18 MMOL/L (ref 21–32)
CO2 SERPL-SCNC: 19 MMOL/L (ref 21–32)
CREAT SERPL-MCNC: 2.41 MG/DL (ref 0.6–1.3)
CREAT SERPL-MCNC: 2.41 MG/DL (ref 0.6–1.3)
ERYTHROCYTE [DISTWIDTH] IN BLOOD BY AUTOMATED COUNT: 17.3 % (ref 11.6–15.1)
GFR SERPL CREATININE-BSD FRML MDRD: 22 ML/MIN/1.73SQ M
GFR SERPL CREATININE-BSD FRML MDRD: 22 ML/MIN/1.73SQ M
GLUCOSE SERPL-MCNC: 120 MG/DL (ref 65–140)
GLUCOSE SERPL-MCNC: 121 MG/DL (ref 65–140)
GLUCOSE SERPL-MCNC: 128 MG/DL (ref 65–140)
GLUCOSE SERPL-MCNC: 134 MG/DL (ref 65–140)
GLUCOSE SERPL-MCNC: 147 MG/DL (ref 65–140)
GLUCOSE SERPL-MCNC: 95 MG/DL (ref 65–140)
HCT VFR BLD AUTO: 25.9 % (ref 34.8–46.1)
HGB BLD-MCNC: 8 G/DL (ref 11.5–15.4)
MCH RBC QN AUTO: 31.4 PG (ref 26.8–34.3)
MCHC RBC AUTO-ENTMCNC: 30.9 G/DL (ref 31.4–37.4)
MCV RBC AUTO: 102 FL (ref 82–98)
PLATELET # BLD AUTO: 195 THOUSANDS/UL (ref 149–390)
PMV BLD AUTO: 11.7 FL (ref 8.9–12.7)
POTASSIUM SERPL-SCNC: 3.7 MMOL/L (ref 3.5–5.3)
POTASSIUM SERPL-SCNC: 4.1 MMOL/L (ref 3.5–5.3)
PROT SERPL-MCNC: 5.8 G/DL (ref 6.4–8.2)
RBC # BLD AUTO: 2.55 MILLION/UL (ref 3.81–5.12)
SODIUM SERPL-SCNC: 142 MMOL/L (ref 136–145)
SODIUM SERPL-SCNC: 149 MMOL/L (ref 136–145)
TACROLIMUS BLD-MCNC: 10 NG/ML (ref 2–20)
TACROLIMUS BLD-MCNC: 7.1 NG/ML (ref 2–20)
WBC # BLD AUTO: 13.25 THOUSAND/UL (ref 4.31–10.16)
WBC SPEC QL GRAM STN: NORMAL

## 2019-12-12 PROCEDURE — 85027 COMPLETE CBC AUTOMATED: CPT | Performed by: INTERNAL MEDICINE

## 2019-12-12 PROCEDURE — 99232 SBSQ HOSP IP/OBS MODERATE 35: CPT | Performed by: INTERNAL MEDICINE

## 2019-12-12 PROCEDURE — 80053 COMPREHEN METABOLIC PANEL: CPT | Performed by: INTERNAL MEDICINE

## 2019-12-12 PROCEDURE — 80048 BASIC METABOLIC PNL TOTAL CA: CPT | Performed by: HOSPITALIST

## 2019-12-12 PROCEDURE — 82948 REAGENT STRIP/BLOOD GLUCOSE: CPT

## 2019-12-12 PROCEDURE — 80197 ASSAY OF TACROLIMUS: CPT | Performed by: INTERNAL MEDICINE

## 2019-12-12 PROCEDURE — 99233 SBSQ HOSP IP/OBS HIGH 50: CPT | Performed by: INTERNAL MEDICINE

## 2019-12-12 PROCEDURE — 99232 SBSQ HOSP IP/OBS MODERATE 35: CPT | Performed by: HOSPITALIST

## 2019-12-12 RX ORDER — METRONIDAZOLE 500 MG/1
500 TABLET ORAL EVERY 8 HOURS SCHEDULED
Status: DISCONTINUED | OUTPATIENT
Start: 2019-12-12 | End: 2019-12-16

## 2019-12-12 RX ORDER — POTASSIUM CHLORIDE 20MEQ/15ML
40 LIQUID (ML) ORAL ONCE
Status: COMPLETED | OUTPATIENT
Start: 2019-12-12 | End: 2019-12-12

## 2019-12-12 RX ORDER — DEXTROSE MONOHYDRATE 50 MG/ML
50 INJECTION, SOLUTION INTRAVENOUS CONTINUOUS
Status: DISCONTINUED | OUTPATIENT
Start: 2019-12-12 | End: 2019-12-12

## 2019-12-12 RX ORDER — TACROLIMUS 1 MG/1
2 CAPSULE ORAL EVERY 12 HOURS SCHEDULED
Status: DISCONTINUED | OUTPATIENT
Start: 2019-12-12 | End: 2019-12-14

## 2019-12-12 RX ADMIN — ACETAMINOPHEN 650 MG: 325 TABLET ORAL at 09:30

## 2019-12-12 RX ADMIN — DULOXETINE HYDROCHLORIDE 60 MG: 60 CAPSULE, DELAYED RELEASE ORAL at 09:10

## 2019-12-12 RX ADMIN — SEVELAMER HYDROCHLORIDE 800 MG: 800 TABLET, FILM COATED PARENTERAL at 09:09

## 2019-12-12 RX ADMIN — TACROLIMUS 3 MG: 1 CAPSULE ORAL at 09:11

## 2019-12-12 RX ADMIN — SODIUM BICARBONATE 100 ML/HR: 84 INJECTION, SOLUTION INTRAVENOUS at 14:37

## 2019-12-12 RX ADMIN — DOXAZOSIN 2 MG: 2 TABLET ORAL at 21:36

## 2019-12-12 RX ADMIN — POTASSIUM CHLORIDE 40 MEQ: 20 SOLUTION ORAL at 11:38

## 2019-12-12 RX ADMIN — HYDRALAZINE HYDROCHLORIDE 50 MG: 50 TABLET, FILM COATED ORAL at 13:20

## 2019-12-12 RX ADMIN — ROPINIROLE 0.25 MG: 0.25 TABLET, FILM COATED ORAL at 17:17

## 2019-12-12 RX ADMIN — ASPIRIN 81 MG 81 MG: 81 TABLET ORAL at 09:09

## 2019-12-12 RX ADMIN — TACROLIMUS 2 MG: 1 CAPSULE ORAL at 21:39

## 2019-12-12 RX ADMIN — CLONIDINE HYDROCHLORIDE 0.2 MG: 0.1 TABLET ORAL at 05:32

## 2019-12-12 RX ADMIN — METOPROLOL TARTRATE 50 MG: 50 TABLET, FILM COATED ORAL at 09:10

## 2019-12-12 RX ADMIN — PREDNISONE 5 MG: 5 TABLET ORAL at 09:10

## 2019-12-12 RX ADMIN — METOPROLOL TARTRATE 50 MG: 50 TABLET, FILM COATED ORAL at 21:36

## 2019-12-12 RX ADMIN — METRONIDAZOLE 500 MG: 500 TABLET, FILM COATED ORAL at 13:20

## 2019-12-12 RX ADMIN — HEPARIN SODIUM 5000 UNITS: 5000 INJECTION INTRAVENOUS; SUBCUTANEOUS at 13:20

## 2019-12-12 RX ADMIN — LEVOTHYROXINE SODIUM 125 MCG: 125 TABLET ORAL at 05:32

## 2019-12-12 RX ADMIN — CEFTRIAXONE SODIUM 2000 MG: 10 INJECTION, POWDER, FOR SOLUTION INTRAVENOUS at 10:50

## 2019-12-12 RX ADMIN — METRONIDAZOLE 500 MG: 500 TABLET, FILM COATED ORAL at 21:37

## 2019-12-12 RX ADMIN — Medication 250 MG: at 09:10

## 2019-12-12 RX ADMIN — ONDANSETRON 4 MG: 2 INJECTION INTRAMUSCULAR; INTRAVENOUS at 09:30

## 2019-12-12 RX ADMIN — CLONIDINE HYDROCHLORIDE 0.2 MG: 0.1 TABLET ORAL at 13:25

## 2019-12-12 RX ADMIN — LORAZEPAM 2 MG: 1 TABLET ORAL at 21:45

## 2019-12-12 RX ADMIN — Medication 250 MG: at 17:17

## 2019-12-12 RX ADMIN — FERROUS SULFATE TAB 325 MG (65 MG ELEMENTAL FE) 325 MG: 325 (65 FE) TAB at 09:10

## 2019-12-12 RX ADMIN — ROPINIROLE 0.25 MG: 0.25 TABLET, FILM COATED ORAL at 09:10

## 2019-12-12 RX ADMIN — SEVELAMER HYDROCHLORIDE 800 MG: 800 TABLET, FILM COATED PARENTERAL at 17:17

## 2019-12-12 RX ADMIN — DULOXETINE HYDROCHLORIDE 60 MG: 60 CAPSULE, DELAYED RELEASE ORAL at 17:17

## 2019-12-12 RX ADMIN — HYDRALAZINE HYDROCHLORIDE 50 MG: 50 TABLET, FILM COATED ORAL at 21:37

## 2019-12-12 RX ADMIN — HYDRALAZINE HYDROCHLORIDE 50 MG: 50 TABLET, FILM COATED ORAL at 05:34

## 2019-12-12 RX ADMIN — HEPARIN SODIUM 5000 UNITS: 5000 INJECTION INTRAVENOUS; SUBCUTANEOUS at 05:39

## 2019-12-12 RX ADMIN — SERTRALINE HYDROCHLORIDE 200 MG: 100 TABLET ORAL at 09:08

## 2019-12-12 RX ADMIN — BUSPIRONE HYDROCHLORIDE 5 MG: 5 TABLET ORAL at 17:21

## 2019-12-12 RX ADMIN — DEXTROSE MONOHYDRATE 50 ML/HR: 50 INJECTION, SOLUTION INTRAVENOUS at 09:49

## 2019-12-12 RX ADMIN — PRAVASTATIN SODIUM 80 MG: 80 TABLET ORAL at 09:08

## 2019-12-12 RX ADMIN — SEVELAMER HYDROCHLORIDE 800 MG: 800 TABLET, FILM COATED PARENTERAL at 11:38

## 2019-12-12 RX ADMIN — SODIUM CHLORIDE, SODIUM GLUCONATE, SODIUM ACETATE, POTASSIUM CHLORIDE, MAGNESIUM CHLORIDE, SODIUM PHOSPHATE, DIBASIC, AND POTASSIUM PHOSPHATE 75 ML/HR: .53; .5; .37; .037; .03; .012; .00082 INJECTION, SOLUTION INTRAVENOUS at 02:10

## 2019-12-12 RX ADMIN — FOLIC ACID 1000 MCG: 1 TABLET ORAL at 09:09

## 2019-12-12 RX ADMIN — ARIPIPRAZOLE 30 MG: 10 TABLET ORAL at 21:35

## 2019-12-12 RX ADMIN — HEPARIN SODIUM 5000 UNITS: 5000 INJECTION INTRAVENOUS; SUBCUTANEOUS at 21:38

## 2019-12-12 RX ADMIN — BUSPIRONE HYDROCHLORIDE 5 MG: 5 TABLET ORAL at 09:10

## 2019-12-12 RX ADMIN — MELATONIN 3000 UNITS: at 09:09

## 2019-12-12 RX ADMIN — SODIUM BICARBONATE 650 MG TABLET 1300 MG: at 09:09

## 2019-12-12 RX ADMIN — CLONIDINE HYDROCHLORIDE 0.2 MG: 0.1 TABLET ORAL at 21:37

## 2019-12-12 NOTE — PLAN OF CARE
Problem: Potential for Falls  Goal: Patient will remain free of falls  Description  INTERVENTIONS:  - Assess patient frequently for physical needs  -  Identify cognitive and physical deficits and behaviors that affect risk of falls  -  [place] fall precautions as indicated by assessment   - Educate patient/family on patient safety including physical limitations  - Instruct patient to call for assistance with activity based on assessment  - Modify environment to reduce risk of injury  - Consider OT/PT consult to assist with strengthening/mobility  Outcome: Progressing     Problem: Prexisting or High Potential for Compromised Skin Integrity  Goal: Skin integrity is maintained or improved  Description  INTERVENTIONS:  - Identify patients at risk for skin breakdown  - Assess and monitor skin integrity  - Assess and monitor nutrition and hydration status  - Monitor labs   - Assess for incontinence   - Turn and reposition patient  - Assist with mobility/ambulation  - Relieve pressure over bony prominences  - Avoid friction and shearing  - Provide appropriate hygiene as needed including keeping skin clean and dry  - Evaluate need for skin moisturizer/barrier cream  - Collaborate with interdisciplinary team   - Patient/family teaching  - Consider wound care consult   Outcome: Progressing     Problem: Nutrition/Hydration-ADULT  Goal: Nutrient/Hydration intake appropriate for improving, restoring or maintaining nutritional needs  Description  Monitor and assess patient's nutrition/hydration status for malnutrition  Collaborate with interdisciplinary team and initiate plan and interventions as ordered  Monitor patient's weight and dietary intake as ordered or per policy  Utilize nutrition screening tool and intervene as necessary  Determine patient's food preferences and provide high-protein, high-caloric foods as appropriate       INTERVENTIONS:  - Monitor oral intake, urinary output, labs, and treatment plans  - Assess nutrition and hydration status and recommend course of action  - Evaluate amount of meals eaten  - Assist patient with eating if necessary   - Allow adequate time for meals  - Recommend/ encourage appropriate diets, oral nutritional supplements, and vitamin/mineral supplements  - Order, calculate, and assess calorie counts as needed  - Recommend, monitor, and adjust tube feedings and TPN/PPN based on assessed needs  - Assess need for intravenous fluids  - Provide specific nutrition/hydration education as appropriate  - Include patient/family/caregiver in decisions related to nutrition  Outcome: Progressing     Problem: PAIN - ADULT  Goal: Verbalizes/displays adequate comfort level or baseline comfort level  Description  Interventions:  - Encourage patient to monitor pain and request assistance  - Assess pain using appropriate pain scale  - Administer analgesics based on type and severity of pain and evaluate response  - Implement non-pharmacological measures as appropriate and evaluate response  - Consider cultural and social influences on pain and pain management  - Notify physician/advanced practitioner if interventions unsuccessful or patient reports new pain  Outcome: Progressing     Problem: INFECTION - ADULT  Goal: Absence or prevention of progression during hospitalization  Description  INTERVENTIONS:  - Assess and monitor for signs and symptoms of infection  - Monitor lab/diagnostic results  - Monitor all insertion sites, i e  indwelling lines, tubes, and drains  - Monitor endotracheal if appropriate and nasal secretions for changes in amount and color  - [place] appropriate cooling/warming therapies per order  - Administer medications as ordered  - Instruct and encourage patient and family to use good hand hygiene technique  - Identify and instruct in appropriate isolation precautions for identified infection/condition  Outcome: Progressing  Goal: Absence of fever/infection during neutropenic period  Description  INTERVENTIONS:  - Monitor WBC    Outcome: Progressing     Problem: SAFETY ADULT  Goal: Maintain or return to baseline ADL function  Description  INTERVENTIONS:  -  Assess patient's ability to carry out ADLs; assess patient's baseline for ADL function and identify physical deficits which impact ability to perform ADLs (bathing, care of mouth/teeth, toileting, grooming, dressing, etc )  - Assess/evaluate cause of self-care deficits   - Assess range of motion  - Assess patient's mobility; develop plan if impaired  - Assess patient's need for assistive devices and provide as appropriate  - Encourage maximum independence but intervene and supervise when necessary  - Involve family in performance of ADLs  - Assess for home care needs following discharge   - Consider OT consult to assist with ADL evaluation and planning for discharge  - Provide patient education as appropriate  Outcome: Progressing  Goal: Maintain or return mobility status to optimal level  Description  INTERVENTIONS:  - Assess patient's baseline mobility status (ambulation, transfers, stairs, etc )    - Identify cognitive and physical deficits and behaviors that affect mobility  - Identify mobility aids required to assist with transfers and/or ambulation (gait belt, sit-to-stand, lift, walker, cane, etc )  - [place] fall precautions as indicated by assessment  - Record patient progress and toleration of activity level on Mobility SBAR; progress patient to next Phase/Stage  - Instruct patient to call for assistance with activity based on assessment  - Consider rehabilitation consult to assist with strengthening/weightbearing, etc   Outcome: Progressing     Problem: DISCHARGE PLANNING  Goal: Discharge to home or other facility with appropriate resources  Description  INTERVENTIONS:  - Identify barriers to discharge w/patient and caregiver  - Arrange for needed discharge resources and transportation as appropriate  - Identify discharge learning needs (meds, wound care, etc )  - Arrange for interpretive services to assist at discharge as needed  - Refer to Case Management Department for coordinating discharge planning if the patient needs post-hospital services based on physician/advanced practitioner order or complex needs related to functional status, cognitive ability, or social support system  Outcome: Progressing     Problem: Knowledge Deficit  Goal: Patient/family/caregiver demonstrates understanding of disease process, treatment plan, medications, and discharge instructions  Description  Complete learning assessment and assess knowledge base    Interventions:  - Provide teaching at level of understanding  - Provide teaching via preferred learning methods  Outcome: Progressing

## 2019-12-12 NOTE — PROGRESS NOTES
NEPHROLOGY PROGRESS NOTE   Jo Villanueva 53 y o  female MRN: [id number]  Unit/Bed#: -01 Encounter: [id number]  Reason for Consult: Assistance with Met Acidosis as well as Recurrent UTI with h/o left kidney transplant    ASSESSMENT and PLAN:    Dar Dumont a 53 y  o  female with a PMHx of CKD stage 4, s/p kidney/pancreas transplant in 1998, recurrent UTIs, T1DM, HTN, Chronic Metabolic Acidosis, MM, and depression who was admitted to [facility] presenting with new onset confusion and diffuse abdominal pain in the setting of poor PO intake and significant diarrhea  A renal consultation is requested today for assistance in the management of acute on chronic metabolic acidosis and UTI      1) Diffuse Abdominal pain (possibly UTI vs Colitis)  - possibly secondary to UTI or Colitis  - patient has a h/o recurrent UTIs in the past year ("too many to count"), quite possibly due to immunosuppressive therapy  - pain is worse over allograft site but due to stable Cr, UTI is much higher on the DDx than rejection  - Recently was admitted to [facility] for VRE UTI for which she was treated with a complete course of Daptomycin and was discharged on 12/6/2019  - Continued frequent diarrhea  - Has diffuse abdominal pain more significant in the LLQ  - + CVA tenderness in the right and left (more significant over the right kidney)  - + Rigors, - chills, currently Afebrile  - UA in the ED positive for innumerbale WBCs, RBCs, as well as occasional bacteria  - Has a history of Urinary Retention which she was supposed to be managing with Straight Catheterizations, but has not been doing so --> this significantly increases her risk of infection especially in the allograft  - Ucx came back positive for VRE, ID thinks that this is chronic colonization and due to lack of symptoms does not want to treat  - Bcx came back + for gram negative rods, ID has started on IV ceftriaxone  - PVRs so far showing no urinary retention  - C   Diff negative  - Stool PCR negative  - ID suggesting this is Viral Enterocolitis  - Diarrhea resolved as of now  - Abdominal pain significantly improving  - WBC trending upwards but still remains afebrile  - ID is on board and will be giving recommendations  - No signs of EDUARDO at the moment as Cr is at baseline     Plan  - Trend WBCs  - Check PVRs every shift to assess for urinary retention, especially in the setting of new complaint of urinary incontinence  If there are retention issues, consider getting Urology on board to assess need for intervention  - Follow up CMV DNA PCR  - Follow ID recommendations as might have to switch back to Daptomycin in the setting of recurrent VRE       2) Metabolic Acidosis  - seems to be acute on chronic  - likely acute due to recent significant diarrhea and poor PO intake, without nausea and vomiting  - could be due to 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 and associated recent hypokalemia  - Possibly due to kidney damage from recurrent UTIs, but all recent US have been negative hydronephrosis and visible kidney scarring  - has not been taking any nephrotoxic agents other than Tacrolimus and Recent Daptomycin  - Mental status is now back to baseline of AAOx3  - has history of chronic metabolic acidosis in the setting of kidney transplant  - Non-AG Met Acid (AG is now 9), Serum Bicarb at 18 and improving with current Isolyte and oral bicarb regiment     Plan  - Urine electrolytes to determine UAG  - Follow up Tacrolimus level, especially in the setting of diarrhea, if level comes back elevated (>4) it will need to be temporarily reduced  - Change fluids to Sterile Free water + 75meq bicarb  - Hold Bicarb Tabs    3) Diarrhea  - Resolved as of rn  - each diarrheal episode not producing high volume of stool as per nursing team  - C   Diff negative  - ID suggests that this is Viral Enterocolitis     Plan  - continue with treatment as per ID  - f/u CMV DNA PCR  - consider possible consult to GI to assess issue of chronic diarrhea if there is absolutely no improvement after ID recommended treatment      4) Hypernatremia  - Na now at 149 from 146 yesterday  - seems likely 2/2 GI losses as patient had almost 12 episodes of loose stools yesterday  - Diarrhea has resolved since yesterday and she has been on continuous fluids  - Patient not endorsing any muscle cramping, excessive thirst, irritability, dry mouth, or AMS    Plan  - Change fluids to sterile water + 75meq of bicarb  - check Uosm and Margaret to help rule out other causes of hypernatremia as her Na has been consistently high during admission    5) AMS and Chronic Fatigue (RESOLVED  - secondary to UTI vs  Metabolic Acidosis  - could even be due to development of transplant rejection as patient has abdominal pain over the transplant site and chronic fatigue, as well as AMS and Diarrhea  - Kidneys have been atrophic for a long time     Plan  - continue treatment as above  - continue to monitor mental status  - consider biopsy of kidney as last resort to look for rejection if all other treatments as above do not produce favorable results      6) CKD 4  - Cr is at baseline which is in 2 0 - 2 5  - Current Cr is 2 41  - No signs right now of Acute on Chronic kidney injury  - Patient states to have regular urine output but not yet recorded in system  - following Dr Ivelisse Simpson as an outpatient  - 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 but has been stable for years     Plan  - Avoid nephrotoxins  - Check BMP regulalry  - continue treatment as above    7) Hypokalemia  - K+ continues to remain low, at 3 7 today  - likely secondary to continued diarrhea     Plan  - Switch fluids to Sterile free water with 75meq of bicarb  - replete K + by giving 20meq of oral liquid solution (not giving tabs due to her diarrhea) for better absorption  - goal >3 5     Manage other chronic problems as per primary team      SUBJECTIVE / INTERVAL HISTORY:    Patient was seen and examined at bedside  Looks much better and is still AAOx3  No acute overnight events    Patient endorsing improvement of abdominal pain  She continues to endorse shaking as well as chills  The patient's diarrhea has resolved as of last night  Had one episode of loose stool this morning but frequency is significantly Improving  Patient has no other acute complaints  She does state consistent and continued urination    U output yesterday not recorded    OBJECTIVE:  Current Weight: Weight - Scale: 110 kg (242 lb 15 2 oz)  Vitals:    12/12/19 0600 12/12/19 0755 12/12/19 0756 12/12/19 0800   BP:  147/50 147/50 147/50   Pulse:       Resp:  18 19    Temp:  98 2 °F (36 8 °C) 98 2 °F (36 8 °C)    TempSrc:       SpO2:       Weight: 110 kg (242 lb 15 2 oz)      Height:           Intake/Output Summary (Last 24 hours) at 12/12/2019 1657  Last data filed at 12/12/2019 0900  Gross per 24 hour   Intake 2461 25 ml   Output    Net 2461 25 ml       Review of Systems:    No chest pain, SOB, weakness, pain, hematuria, dysuria, urgency, frequency, oliguria, fevers    Positive for chills, shaking, abdominal pain (improving), nausea    Physical Exam   Constitutional: She is oriented to person, place, and time  She appears well-developed and well-nourished  No distress  Looks visually much better  She is more communicative  Sitting up straight   HENT:   Head: Normocephalic and atraumatic  Eyes: Pupils are equal, round, and reactive to light  Conjunctivae and EOM are normal    Neck: Normal range of motion  Neck supple  No JVD present  No thyromegaly present  Cardiovascular: Normal rate, regular rhythm, normal heart sounds and intact distal pulses  Exam reveals no gallop and no friction rub  No murmur heard  Pulmonary/Chest: Effort normal and breath sounds normal  No respiratory distress  She has no wheezes  She has no rales  Abdominal: Soft  Bowel sounds are normal  She exhibits mass (allograft in LLQ)   She exhibits no distension  There is tenderness (continued tenderness in the abdomen but very mild even to palpation compared to yesterday  significant improvement)  There is no guarding  Musculoskeletal: Normal range of motion  5/5 upper and lower extremities, much improved   strength great bilaterally, much improved   Neurological: She is alert and oriented to person, place, and time  She displays normal reflexes  No cranial nerve deficit or sensory deficit  Coordination normal    Skin: Skin is warm and dry  She is not diaphoretic  No erythema  Psychiatric: She has a normal mood and affect  Her behavior is normal  Judgment and thought content normal    Nursing note and vitals reviewed           Medications:    Current Facility-Administered Medications:     acetaminophen (TYLENOL) tablet 650 mg, 650 mg, Oral, Q6H PRN, JODI Hi-C, 650 mg at 12/11/19 0149    aluminum-magnesium hydroxide-simethicone (MYLANTA) 200-200-20 mg/5 mL oral suspension 30 mL, 30 mL, Oral, Q4H PRN, JODI Hi-C, 30 mL at 12/11/19 0148    ARIPiprazole (ABILIFY) tablet 30 mg, 30 mg, Oral, HS, Nishant Hernández, DO, 30 mg at 12/11/19 2156    aspirin chewable tablet 81 mg, 81 mg, Oral, Daily, Nishant Hernández, DO, 81 mg at 12/12/19 0909    busPIRone (BUSPAR) tablet 5 mg, 5 mg, Oral, BID, Nishant Hernández, DO, 5 mg at 12/12/19 0910    cholecalciferol (VITAMIN D3) tablet 3,000 Units, 3,000 Units, Oral, Daily, Nishant Hernández DO, 3,000 Units at 12/12/19 0909    cloNIDine (CATAPRES) tablet 0 2 mg, 0 2 mg, Oral, Q8H [facility], Idris Her MD, 0 2 mg at 12/12/19 0532    doxazosin (CARDURA) tablet 2 mg, 2 mg, Oral, HS, Idris Her MD, 2 mg at 12/11/19 2155    DULoxetine (CYMBALTA) delayed release capsule 60 mg, 60 mg, Oral, BID, Nishant Hernández DO, 60 mg at 12/12/19 0910    ferrous sulfate tablet 325 mg, 325 mg, Oral, Daily With Breakfast, Nishant Hernández DO, 325 mg at 01/35/31 7724    folic acid (FOLVITE) tablet 1,000 mcg, 1,000 mcg, Oral, Daily, Carri Ramos DO, 1,000 mcg at 12/12/19 0909    heparin (porcine) subcutaneous injection 5,000 Units, 5,000 Units, Subcutaneous, Q8H [address], 5,000 Units at 12/12/19 0539 **AND** [CANCELED] Platelet count, , , Once, Carri Ramos DO    hydrALAZINE (APRESOLINE) tablet 50 mg, 50 mg, Oral, Q8H [address], George Merrill MD, 50 mg at 12/12/19 0534    insulin lispro (HumaLOG) 100 units/mL subcutaneous injection 1-5 Units, 1-5 Units, Subcutaneous, HS, Carri Ramos DO    insulin lispro (HumaLOG) 100 units/mL subcutaneous injection 1-6 Units, 1-6 Units, Subcutaneous, TID AC **AND** Fingerstick Glucose (POCT), , , 4x Daily AC and at bedtime, Giuliano Vega MD    levothyroxine tablet 125 mcg, 125 mcg, Oral, Early Morning, Carri Ramos DO, 125 mcg at 12/12/19 0532    loperamide (IMODIUM) capsule 4 mg, 4 mg, Oral, TID PRN, Giuliano Vega MD, 4 mg at 12/11/19 2156    LORazepam (ATIVAN) tablet 2 mg, 2 mg, Oral, TID PRN, Carri Ramos DO, 2 mg at 12/11/19 0149    metoprolol tartrate (LOPRESSOR) tablet 50 mg, 50 mg, Oral, Q12H [address], Carri Ramos DO, 50 mg at 12/12/19 0910    multi-electrolyte (PLASMALYTE-A/ISOLYTE-S PH 7 4) IV solution, 75 mL/hr, Intravenous, Continuous, George Merrill MD, Last Rate: 75 mL/hr at 12/12/19 0210, 75 mL/hr at 12/12/19 0210    ondansetron (ZOFRAN) injection 4 mg, 4 mg, Intravenous, Q4H PRN, Jumana Escalante MD, 4 mg at 12/10/19 1529    pravastatin (PRAVACHOL) tablet 80 mg, 80 mg, Oral, Daily, Carri Ramos DO, 80 mg at 12/12/19 0908    predniSONE tablet 5 mg, 5 mg, Oral, Daily, Carri Ramos DO, 5 mg at 12/12/19 0910    rOPINIRole (REQUIP) tablet 0 25 mg, 0 25 mg, Oral, BID, Carri Ramos DO, 0 25 mg at 12/12/19 4117    saccharomyces boulardii (FLORASTOR) capsule 250 mg, 250 mg, Oral, BID, Carri Ramos DO, 250 mg at 12/12/19 0910    sertraline (ZOLOFT) tablet 200 mg, 200 mg, Oral, Daily, Carri Ramos DO, 200 mg at 12/12/19 0908   sevelamer (RENAGEL) tablet 800 mg, 800 mg, Oral, TID With Meals, [place], DO, 800 mg at 12/12/19 0909    sodium bicarbonate tablet 1,300 mg, 1,300 mg, Oral, TID, [place], DO, 1,300 mg at 12/12/19 0909    tacrolimus (PROGRAF) capsule 3 mg, 3 mg, Oral, Q12H [address], [place], DO, 3 mg at 12/12/19 0911    Laboratory Results:  Results from last 7 days   Lab Units 12/12/19  0552 12/12/19  0551 12/11/19  0501 12/10/19  0506 12/10/19  0502 12/09/19 2023 12/06/19  0601   WBC Thousand/uL 13 25*  --  10 49*  --  10 10 9 58 5 00   HEMOGLOBIN g/dL 8 0*  --  8 8*  --  9 2* 9 9* 8 3*   HEMATOCRIT % 25 9*  --  27 8*  --  28 4* 30 8* 25 4*   PLATELETS Thousands/uL 195  --  206  --  184 223 152   POTASSIUM mmol/L  --  3 7 3 1* 3 2*  --  3 5 3 7   CHLORIDE mmol/L  --  122* 120* 120*  --  123* 109*   CO2 mmol/L  --  18* 18* 16*  --  14* 17*   BUN mg/dL  --  39* 39* 41*  --  46* 51*   CREATININE mg/dL  --  2 41* 2 24* 2 29*  --  2 36* 2 71*   CALCIUM mg/dL  --  8 3 8 4 8 4  --  8 7 8 3   MAGNESIUM mg/dL  --   --   --  1 8  --   --   --

## 2019-12-12 NOTE — RESTORATIVE TECHNICIAN NOTE
Restorative Specialist Mobility Note       Activity: Ambulate in barron, Ambulate in room, Bathroom privileges, Chair, Dangle, Stand at bedside, Commode(Educated/encouraged pt to ambulate with assistance 3-4 x's/day  Chair alarm on   Pt callbell, phone/tray within reach )     Assistive Device: Front wheel walker, Other (Comment)(Assist x1 with B/L shoes on )    Ilda Daily BS, Restorative Technician, [employer]

## 2019-12-12 NOTE — PROGRESS NOTES
Progress Note - Infectious Disease   Lance Gill 53 y o  female MRN: [id number]  Unit/Bed#: -01 Encounter: [id number]      Impression/Recommendations:  1   Gram-negative bacteremia  New  Likely due to # 2 versus GI translocation  Consider anaerobes given late growth  No  symptoms and urine culture has other organism  Clinically stable without sepsis  Rec:  · Start ceftriaxone, Flagyl  · Follow-up ID/susceptibility and tailor antibiotics as indicated  · Follow temperatures closely    2  Acute enterocolitis   Suspect acute viral infection   Consider bacterial infection given # 1  Stool enteric PCR, C  Diff negative  Procalcitonin bland   Clinically stable without sepsis   Abdominal exam benign  Slowly improving    Rec:  ? Start antibiotics as above  ? Imodium PRN  ? Serial abdominal exams  ? Follow temperatures closely  ? Recheck CBC in AM  ? Supportive care as per the primary service     3   Asymptomatic bacteriuria   Seen on UA   Likely chronically colonized  [email or web address] active symptoms of UTI  Rec:  ? Continue to follow closely off antibiotics     4   Recent VRE UTI   Status post 7 days daptomycin with clinical improvement     5   History of renal/pancreatic transplant   1998   Has pancreatic secretions draining in to bladder   On chronic immunosuppression      6   CKD   Stable at baseline creatinine 2  5      7   MM   Recently started on Revlimid      The above impression and plan was discussed in detail with the patient and Dr Paresh Strong      Antibiotics:  None    Subjective:  Patient seen on PM rounds  Feels somewhat better today  Decreased stool frequency  Able to take small amounts of PO  Denies fevers, chills, sweats, nausea, vomiting  24 Hour Events:  No documented fevers, chills, sweats, nausea, vomiting  Continues to have watery bowel movements  Blood culture with late growth for gram-negative cintia        Objective:  Vitals:  Temp:  [97 7 °F (36 5 °C)-98 2 °F (36 8 °C)] 98 2 °F (36 8 °C)  HR:  [62-84] 62  Resp:  [17-19] 17  BP: (147-168)/(50-73) 168/64  SpO2:  [96 %-97 %] 97 %  Temp (24hrs), Av 1 °F (36 7 °C), Min:97 7 °F (36 5 °C), Max:98 2 °F (36 8 °C)  Current: Temperature: 98 2 °F (36 8 °C)    Physical Exam:   General:  No acute distress  Eyes:  Normal lids and conjunctivae  ENT:  Normal external ears and nose  Neck:  Neck symmetric with midline trachea  Pulmonary:  Normal respiratory effort without accessory muscle use  Cardiovascular:  Regular rate and rhythm; no peripheral edema  Gastrointestinal:  No tenderness or distention  Musculoskeletal:  No digital clubbing or cyanosis  Skin:  No visible rashes; No palpable nodules  Neurologic:  Sensation grossly intact to light touch  Psychiatric:  Alert and oriented; Normal mood    Lab Results:  I have personally reviewed pertinent labs  Results from last 7 days   Lab Units 19  0551 19  0501 12/10/19  0506 19   POTASSIUM mmol/L 3 7 3 1* 3 2* 3 5   CHLORIDE mmol/L 122* 120* 120* 123*   CO2 mmol/L 18* 18* 16* 14*   BUN mg/dL 39* 39* 41* 46*   CREATININE mg/dL 2 41* 2 24* 2 29* 2 36*   EGFR ml/min/1 73sq m 22 24 23 23   CALCIUM mg/dL 8 3 8 4 8 4 8 7   AST U/L 19 30  --  19   ALT U/L 20 21  --  17   ALK PHOS U/L 91 101  --  106     Results from last 7 days   Lab Units 19  0552 19  0501 12/10/19  0502   WBC Thousand/uL 13 25* 10 49* 10 10   HEMOGLOBIN g/dL 8 0* 8 8* 9 2*   PLATELETS Thousands/uL 195 206 184     Results from last 7 days   Lab Units 12/10/19  1633 19   BLOOD CULTURE   --   --  No Growth at 48 hrs   --    GRAM STAIN RESULT   --  Gram negative rods*  --   --    URINE CULTURE   --   --   --  >100,000 cfu/ml Enterococcus faecium*  8488-1408 cfu/ml Gram-negative cintia- species*   C DIFF TOXIN B  Negative  --   --   --        Imaging Studies:   I have personally reviewed pertinent imaging study reports and images in PACS      EKG, Pathology, and Other Studies:   I have personally reviewed pertinent reports

## 2019-12-13 ENCOUNTER — TELEPHONE (OUTPATIENT)
Dept: HEMATOLOGY ONCOLOGY | Facility: CLINIC | Age: 54
End: 2019-12-13

## 2019-12-13 PROBLEM — R78.81 BACTEREMIA: Status: ACTIVE | Noted: 2019-12-13

## 2019-12-13 LAB
ALBUMIN SERPL BCP-MCNC: 2.5 G/DL (ref 3.5–5)
ALP SERPL-CCNC: 91 U/L (ref 46–116)
ALT SERPL W P-5'-P-CCNC: 16 U/L (ref 12–78)
ANION GAP SERPL CALCULATED.3IONS-SCNC: 9 MMOL/L (ref 4–13)
AST SERPL W P-5'-P-CCNC: 15 U/L (ref 5–45)
BILIRUB SERPL-MCNC: 0.28 MG/DL (ref 0.2–1)
BUN SERPL-MCNC: 40 MG/DL (ref 5–25)
CALCIUM SERPL-MCNC: 7.9 MG/DL (ref 8.3–10.1)
CHLORIDE SERPL-SCNC: 114 MMOL/L (ref 100–108)
CMV DNA SERPL NAA+PROBE-ACNC: NEGATIVE IU/ML
CMV DNA SERPL NAA+PROBE-ACNC: NEGATIVE IU/ML
CMV DNA SERPL NAA+PROBE-LOG IU: NORMAL LOG10 IU/ML
CMV DNA SERPL NAA+PROBE-LOG IU: NORMAL LOG10 IU/ML
CO2 SERPL-SCNC: 19 MMOL/L (ref 21–32)
CREAT SERPL-MCNC: 2.58 MG/DL (ref 0.6–1.3)
ERYTHROCYTE [DISTWIDTH] IN BLOOD BY AUTOMATED COUNT: 17.2 % (ref 11.6–15.1)
GFR SERPL CREATININE-BSD FRML MDRD: 20 ML/MIN/1.73SQ M
GLUCOSE SERPL-MCNC: 106 MG/DL (ref 65–140)
GLUCOSE SERPL-MCNC: 114 MG/DL (ref 65–140)
GLUCOSE SERPL-MCNC: 128 MG/DL (ref 65–140)
GLUCOSE SERPL-MCNC: 91 MG/DL (ref 65–140)
GLUCOSE SERPL-MCNC: 99 MG/DL (ref 65–140)
HCT VFR BLD AUTO: 23.6 % (ref 34.8–46.1)
HCT VFR BLD AUTO: 24.9 % (ref 34.8–46.1)
HGB BLD-MCNC: 7.3 G/DL (ref 11.5–15.4)
HGB BLD-MCNC: 7.8 G/DL (ref 11.5–15.4)
MCH RBC QN AUTO: 31.6 PG (ref 26.8–34.3)
MCHC RBC AUTO-ENTMCNC: 30.9 G/DL (ref 31.4–37.4)
MCV RBC AUTO: 102 FL (ref 82–98)
PLATELET # BLD AUTO: 157 THOUSANDS/UL (ref 149–390)
PMV BLD AUTO: 11.8 FL (ref 8.9–12.7)
POTASSIUM SERPL-SCNC: 3.4 MMOL/L (ref 3.5–5.3)
PROT SERPL-MCNC: 6 G/DL (ref 6.4–8.2)
RBC # BLD AUTO: 2.31 MILLION/UL (ref 3.81–5.12)
SODIUM SERPL-SCNC: 142 MMOL/L (ref 136–145)
WBC # BLD AUTO: 9.79 THOUSAND/UL (ref 4.31–10.16)

## 2019-12-13 PROCEDURE — 85027 COMPLETE CBC AUTOMATED: CPT | Performed by: INTERNAL MEDICINE

## 2019-12-13 PROCEDURE — 97530 THERAPEUTIC ACTIVITIES: CPT

## 2019-12-13 PROCEDURE — 97110 THERAPEUTIC EXERCISES: CPT

## 2019-12-13 PROCEDURE — 99232 SBSQ HOSP IP/OBS MODERATE 35: CPT | Performed by: INTERNAL MEDICINE

## 2019-12-13 PROCEDURE — 85018 HEMOGLOBIN: CPT | Performed by: HOSPITALIST

## 2019-12-13 PROCEDURE — 85014 HEMATOCRIT: CPT | Performed by: HOSPITALIST

## 2019-12-13 PROCEDURE — 97535 SELF CARE MNGMENT TRAINING: CPT

## 2019-12-13 PROCEDURE — 82948 REAGENT STRIP/BLOOD GLUCOSE: CPT

## 2019-12-13 PROCEDURE — 80053 COMPREHEN METABOLIC PANEL: CPT | Performed by: INTERNAL MEDICINE

## 2019-12-13 PROCEDURE — 99232 SBSQ HOSP IP/OBS MODERATE 35: CPT | Performed by: HOSPITALIST

## 2019-12-13 PROCEDURE — G0515 COGNITIVE SKILLS DEVELOPMENT: HCPCS

## 2019-12-13 RX ORDER — POTASSIUM CHLORIDE 20MEQ/15ML
40 LIQUID (ML) ORAL ONCE
Status: COMPLETED | OUTPATIENT
Start: 2019-12-13 | End: 2019-12-13

## 2019-12-13 RX ORDER — SODIUM CHLORIDE, SODIUM GLUCONATE, SODIUM ACETATE, POTASSIUM CHLORIDE, MAGNESIUM CHLORIDE, SODIUM PHOSPHATE, DIBASIC, AND POTASSIUM PHOSPHATE .53; .5; .37; .037; .03; .012; .00082 G/100ML; G/100ML; G/100ML; G/100ML; G/100ML; G/100ML; G/100ML
75 INJECTION, SOLUTION INTRAVENOUS CONTINUOUS
Status: DISCONTINUED | OUTPATIENT
Start: 2019-12-13 | End: 2019-12-15

## 2019-12-13 RX ADMIN — BUSPIRONE HYDROCHLORIDE 5 MG: 5 TABLET ORAL at 17:03

## 2019-12-13 RX ADMIN — PREDNISONE 5 MG: 5 TABLET ORAL at 08:16

## 2019-12-13 RX ADMIN — ASPIRIN 81 MG 81 MG: 81 TABLET ORAL at 08:17

## 2019-12-13 RX ADMIN — BUSPIRONE HYDROCHLORIDE 5 MG: 5 TABLET ORAL at 08:17

## 2019-12-13 RX ADMIN — CLONIDINE HYDROCHLORIDE 0.2 MG: 0.1 TABLET ORAL at 13:18

## 2019-12-13 RX ADMIN — DULOXETINE HYDROCHLORIDE 60 MG: 60 CAPSULE, DELAYED RELEASE ORAL at 17:03

## 2019-12-13 RX ADMIN — HEPARIN SODIUM 5000 UNITS: 5000 INJECTION INTRAVENOUS; SUBCUTANEOUS at 05:49

## 2019-12-13 RX ADMIN — MELATONIN 3000 UNITS: at 08:13

## 2019-12-13 RX ADMIN — METRONIDAZOLE 500 MG: 500 TABLET, FILM COATED ORAL at 05:49

## 2019-12-13 RX ADMIN — METOPROLOL TARTRATE 50 MG: 50 TABLET, FILM COATED ORAL at 21:46

## 2019-12-13 RX ADMIN — POTASSIUM CHLORIDE 40 MEQ: 20 SOLUTION ORAL at 11:36

## 2019-12-13 RX ADMIN — FOLIC ACID 1000 MCG: 1 TABLET ORAL at 08:16

## 2019-12-13 RX ADMIN — CLONIDINE HYDROCHLORIDE 0.2 MG: 0.1 TABLET ORAL at 05:50

## 2019-12-13 RX ADMIN — HYDRALAZINE HYDROCHLORIDE 50 MG: 50 TABLET, FILM COATED ORAL at 05:49

## 2019-12-13 RX ADMIN — ROPINIROLE 0.25 MG: 0.25 TABLET, FILM COATED ORAL at 17:03

## 2019-12-13 RX ADMIN — HEPARIN SODIUM 5000 UNITS: 5000 INJECTION INTRAVENOUS; SUBCUTANEOUS at 13:17

## 2019-12-13 RX ADMIN — FERROUS SULFATE TAB 325 MG (65 MG ELEMENTAL FE) 325 MG: 325 (65 FE) TAB at 08:16

## 2019-12-13 RX ADMIN — TACROLIMUS 2 MG: 1 CAPSULE ORAL at 08:13

## 2019-12-13 RX ADMIN — METOPROLOL TARTRATE 50 MG: 50 TABLET, FILM COATED ORAL at 08:15

## 2019-12-13 RX ADMIN — HEPARIN SODIUM 5000 UNITS: 5000 INJECTION INTRAVENOUS; SUBCUTANEOUS at 21:45

## 2019-12-13 RX ADMIN — METRONIDAZOLE 500 MG: 500 TABLET, FILM COATED ORAL at 13:17

## 2019-12-13 RX ADMIN — TACROLIMUS 2 MG: 1 CAPSULE ORAL at 21:47

## 2019-12-13 RX ADMIN — Medication 250 MG: at 08:15

## 2019-12-13 RX ADMIN — SODIUM BICARBONATE 100 ML/HR: 84 INJECTION, SOLUTION INTRAVENOUS at 01:00

## 2019-12-13 RX ADMIN — HYDRALAZINE HYDROCHLORIDE 50 MG: 50 TABLET, FILM COATED ORAL at 13:17

## 2019-12-13 RX ADMIN — CLONIDINE HYDROCHLORIDE 0.2 MG: 0.1 TABLET ORAL at 21:45

## 2019-12-13 RX ADMIN — LEVOTHYROXINE SODIUM 125 MCG: 125 TABLET ORAL at 05:49

## 2019-12-13 RX ADMIN — DOXAZOSIN 2 MG: 2 TABLET ORAL at 21:45

## 2019-12-13 RX ADMIN — Medication 250 MG: at 17:03

## 2019-12-13 RX ADMIN — PRAVASTATIN SODIUM 80 MG: 80 TABLET ORAL at 08:15

## 2019-12-13 RX ADMIN — ROPINIROLE 0.25 MG: 0.25 TABLET, FILM COATED ORAL at 08:13

## 2019-12-13 RX ADMIN — SEVELAMER HYDROCHLORIDE 800 MG: 800 TABLET, FILM COATED PARENTERAL at 17:03

## 2019-12-13 RX ADMIN — HYDRALAZINE HYDROCHLORIDE 50 MG: 50 TABLET, FILM COATED ORAL at 21:45

## 2019-12-13 RX ADMIN — SEVELAMER HYDROCHLORIDE 800 MG: 800 TABLET, FILM COATED PARENTERAL at 08:16

## 2019-12-13 RX ADMIN — LOPERAMIDE HYDROCHLORIDE 4 MG: 2 CAPSULE ORAL at 08:16

## 2019-12-13 RX ADMIN — LOPERAMIDE HYDROCHLORIDE 4 MG: 2 CAPSULE ORAL at 17:06

## 2019-12-13 RX ADMIN — SEVELAMER HYDROCHLORIDE 800 MG: 800 TABLET, FILM COATED PARENTERAL at 11:36

## 2019-12-13 RX ADMIN — SERTRALINE HYDROCHLORIDE 200 MG: 100 TABLET ORAL at 08:14

## 2019-12-13 RX ADMIN — CEFTRIAXONE SODIUM 2000 MG: 10 INJECTION, POWDER, FOR SOLUTION INTRAVENOUS at 09:31

## 2019-12-13 RX ADMIN — ARIPIPRAZOLE 30 MG: 10 TABLET ORAL at 21:44

## 2019-12-13 RX ADMIN — METRONIDAZOLE 500 MG: 500 TABLET, FILM COATED ORAL at 21:45

## 2019-12-13 RX ADMIN — SODIUM CHLORIDE, SODIUM GLUCONATE, SODIUM ACETATE, POTASSIUM CHLORIDE, MAGNESIUM CHLORIDE, SODIUM PHOSPHATE, DIBASIC, AND POTASSIUM PHOSPHATE 75 ML/HR: .53; .5; .37; .037; .03; .012; .00082 INJECTION, SOLUTION INTRAVENOUS at 13:07

## 2019-12-13 RX ADMIN — DULOXETINE HYDROCHLORIDE 60 MG: 60 CAPSULE, DELAYED RELEASE ORAL at 08:15

## 2019-12-13 NOTE — SOCIAL WORK
Pt reviewed with SLIM provider  Pt blood  cultures came back positive  No weekend d/c  Pt recommended for OP OT, PT recommended family support

## 2019-12-13 NOTE — RESTORATIVE TECHNICIAN NOTE
Restorative Specialist Mobility Note       Activity: Ambulate in barron, Ambulate in room, Bathroom privileges, Chair, Dangle, Stand at bedside(Educated/encouraged pt to ambulate with assistance 3-4 x's/day  Chair alarm on   Pt callbell, phone/tray within reach )     Assistive Device: Front wheel walker, Other (Comment)(B/L shoes on )          Aries STONE, Restorative Technician, [employer]

## 2019-12-13 NOTE — ASSESSMENT & PLAN NOTE
Lab Results   Component Value Date    HGBA1C 5 1 09/09/2019       Recent Labs     12/11/19  2123 12/12/19  0721 12/12/19  1102 12/12/19  1559   POCGLU 111 121 120 134       Blood Sugar Average: Last 72 hrs:  · (P) 527 [id number] continue Lantus (in place of [place]) 5 units qHS  · Add SSI with accu-cheks and carb controlled diet  · Initiate hypoglycemia protocol and avoid hypoglycemia

## 2019-12-13 NOTE — ASSESSMENT & PLAN NOTE
· Creatinine worse  · Monitor BMP while inpatient  · Avoid nephrotoxins as able, renally dose medications as indicated

## 2019-12-13 NOTE — PHYSICAL THERAPY NOTE
Physical Therapy Progress Note     12/13/19 1319   Pain Assessment   Pain Assessment 0-10   Pain Score 5   Pain Location Abdomen   Pain Orientation Frontal   Restrictions/Precautions   Weight Bearing Precautions Per Order No   Other Precautions Contact/isolation;Cognitive; Chair Alarm; Fall Risk;Pain   General   Family/Caregiver Present No   Cognition   Overall Cognitive Status WFL   Arousal/Participation Alert; Cooperative   Transfers   Sit to Stand 4  Minimal assistance   Additional items Assist x 1;Verbal cues   Stand to Sit 5  Supervision   Additional items Assist x 1;Verbal cues   Ambulation/Elevation   Gait pattern   (slow, short step length)   Gait Assistance 4  Minimal assist   Additional items Assist x 1;Verbal cues   Assistive Device Rolling walker   Distance 30 feet and 50 feet   Balance   Static Sitting Fair   Static Standing Fair -   Dynamic Standing Poor +   Ambulatory Poor +   Endurance Deficit   Endurance Deficit Yes   Endurance Deficit Description fatigue and general weakness   Activity Tolerance   Activity Tolerance Patient limited by fatigue;Patient tolerated treatment well   Nurse [address] Wilder to see per MICHELLE Garrett   Exercises   Hip Flexion Sitting;20 reps;AROM; Bilateral   Knee AROM Long Arc Quad Sitting;20 reps;AROM; Bilateral   Ankle Pumps Sitting;20 reps;AROM; Bilateral   Assessment   Prognosis Fair   Problem List Decreased strength;Decreased endurance; Impaired balance;Decreased mobility; Decreased coordination;Decreased skin integrity;Pain; Impaired judgement   Assessment Pt is making slow but steady progress with the use of a rolling walker  Limited by fatigue and general weakness  Gait is slow with mild unsteadiness requiring min assist for safe walker management  Cues for hand placement for safety with transfers  Completed seated BLE exercises to conclude session  Pt would benefit from continued physical therapy to maximize functional independence     Goals   Patient Goals Get stronger to go home   STG Expiration Date 12/25/19   Short Term Goal #1 1  Pt will demonstrate ability to perform all aspects of bed mobility with I in order to increase independence and decrease burden on caregivers  2  Pt will demonstrate ability to perform functional transfers with Mod I in order to increase independence and decrease burden on caregivers  3  Pt will demonstrate ability to ambulate 200 ft with least restrictive AD with Mod I in order to return to mobility safely  4  Pt will demonstrate ability to negotiate 3 steps with/without HR and Mod I in order to return to household/community mobility safely  5  Pt will demonstrate improved balance by one grade order to decrease risk of falls  6  Pt will increase b/l LE strength by 1 grade in order to increase ease of functional mobility and transfers  PT Treatment Day 1   Plan   Treatment/Interventions Functional transfer training;LE strengthening/ROM; Therapeutic exercise; Endurance training;Patient/family training;Bed mobility;Gait training;Spoke to nursing   Progress Progressing toward goals   PT Frequency   (3-5x/week)   Recommendation   Recommendation Home with family support; Outpatient PT  (Pending progress)   Equipment Recommended Mat Prader; Wheelchair  (RW)     Sonya Wells PTA

## 2019-12-13 NOTE — ASSESSMENT & PLAN NOTE
· With history of recurrent UTIs in setting of renal and pancreatic transplant    Most recently with VRE at recent admission to [facility]  · Presenting now with abdominal pain/nausea and lethargy/confusion  · CT abdomen/pelvis showing colitis  · With positive BC with GNR, started on rocephin & flagyl per ID  · Stool cdiff neg, enteric panel neg, leucocytes neg  · Still having diarrhea - prob little less  · Stop rocephin, only flagl per ID, d/w ID - f/u final cultures, may grow anaerobe  · procalcitonin 0 3  · Trend WBC, temperature curve  · Checked CMV PCR

## 2019-12-13 NOTE — PROGRESS NOTES
Progress Note - Infectious Disease   Zachary Dumont 53 y o  female MRN: [id number]  Unit/Bed#: -01 Encounter: [id number]      Impression/Recommendations:  1   Gram-negative bacteremia  Likely GI translocation in setting of #2  Consider anaerobes given late growth and not growing aerobically in the lab  No  symptoms and urine culture has other organism  CT A/P otherwise negative  Clinically stable without sepsis  Rec:  ? Continue Flagyl for now  ? D/C ceftriaxone  ? Follow-up ID/susceptibility and tailor antibiotics as indicated  ? Will need 7 day total of antibiotics  ? Follow temperatures closely  ? If continues to improve would be stable from ID for D/C over weekend once cultures finalized      2  Acute enterocolitis   Suspect acute viral infection   Stool enteric PCR, C  Diff negative   Procalcitonin bland   Clinically stable without sepsis   Abdominal exam benign  Slowly improving    Rec:  ? Continue antibiotics as above  ? OK from ID for Imodium PRN  ? Serial abdominal exams  ? Follow temperatures closely  ? Supportive care as per the primary service     3   Asymptomatic bacteriuria   Seen on UA   Likely chronically colonized  Jorgito Tijerina active symptoms of UTI  Rec:  ? Continue to follow closely off antibiotics     4   Recent VRE UTI   Status post 7 days daptomycin with clinical improvement     5   History of renal/pancreatic transplant   1998   Has pancreatic secretions draining in to bladder   On chronic immunosuppression      6   CKD   Stable at baseline creatinine 2  5      7   MM   Recently started on Revlimid  The above plan was discussed in detail with Dr Margarita Barr  Antibiotics:  Ceftriaxone #2  Flagyl #2    Subjective:  Patient seen on AM rounds  Overall a little better although still having diarrhea  Hasn't been using imodium  Eating full meals of regular food  24 Hour Events:  No documented fevers, chills, sweats, nausea, vomiting   Multiple loose stools noted by nursing  Objective:  Vitals:  Temp:  [98 1 °F (36 7 °C)-98 6 °F (37 °C)] 98 1 °F (36 7 °C)  HR:  [62-67] 65  Resp:  [16-19] 17  BP: (147-168)/(57-66) 150/57  SpO2:  [95 %-97 %] 95 %  Temp (24hrs), Av 4 °F (36 9 °C), Min:98 1 °F (36 7 °C), Max:98 6 °F (37 °C)  Current: Temperature: 98 1 °F (36 7 °C)    Physical Exam:   General:  No acute distress  Psychiatric:  Awake and alert  Pulmonary:  Normal respiratory excursion without accessory muscle use  Abdomen:  Soft, nontender  Extremities:  No edema  Skin:  No rashes    Lab Results:  I have personally reviewed pertinent labs  Results from last 7 days   Lab Units 19  0555 19  1508 19  0551 19  0501   POTASSIUM mmol/L 3 4* 4 1 3 7 3 1*   CHLORIDE mmol/L 114* 116* 122* 120*   CO2 mmol/L 19* 19* 18* 18*   BUN mg/dL 40* 39* 39* 39*   CREATININE mg/dL 2 58* 2 41* 2 41* 2 24*   EGFR ml/min/1 73sq m 20 22 22 24   CALCIUM mg/dL 7 9* 8 2* 8 3 8 4   AST U/L 15  --  19 30   ALT U/L 16  --  20 21   ALK PHOS U/L 91  --  91 101     Results from last 7 days   Lab Units 19  0555 19  0552 19  0501   WBC Thousand/uL 9 79 13 25* 10 49*   HEMOGLOBIN g/dL 7 3* 8 0* 8 8*   PLATELETS Thousands/uL 157 195 206     Results from last 7 days   Lab Units 12/10/19  1633 19   GRAM STAIN RESULT   --  Gram negative rods* Gram negative rods*  --    URINE CULTURE   --   --   --  >100,000 cfu/ml Vancomycin Resistant Enterococcus faecium*  5579-0753 cfu/ml Gram-negative cintia- species*   C DIFF TOXIN B  Negative  --   --   --        Imaging Studies:   I have personally reviewed pertinent imaging study reports and images in PACS  EKG, Pathology, and Other Studies:   I have personally reviewed pertinent reports

## 2019-12-13 NOTE — SOCIAL WORK
DC Planning:     CM received call from pt's Father Bong Chicas asking for update on dc plan  Fabrizio Ruby expressed continued concerns for safe dc plan d/t patient's recent multiple reAdmissions with recurrent diagnoses  Carly Callejas stated that he feels the patient is unable to physically care for herself, specifically as it relates to personal hygiene  He stated, "due to her size, she has a difficult time wiping herself after going to the bathroom and usually wipes from back to front  Her infections seem to be E Coli type infections"  CM provided Fabrizio Ruby with contact info for following MSW ASL[facility] Leo and told him I would have the care team update him as allowed by the patient who is currently a/ox4  CM discussed the above concerns with Dr Tony Phillip and called and spoke with [facility], asking her to look into hygiene assist devices  MSW CM Felipa advised  If any need for IP rehab, father asked to have IP rehab list emailed to him at:   [email or web address]

## 2019-12-13 NOTE — ASSESSMENT & PLAN NOTE
· Significantly elevated on admission, suspect noncompliance and abdominal pain playing role in this  · Continue home antihypertensive regimen, blood pressure monitoring per protocol  · Was hypotensive, hence meds reduced by nephro, norvasc stopped

## 2019-12-13 NOTE — PLAN OF CARE
Problem: OCCUPATIONAL THERAPY ADULT  Goal: Performs self-care activities at highest level of function for planned discharge setting  See evaluation for individualized goals  Description  Treatment Interventions: ADL retraining, Functional transfer training, Endurance training, Cognitive reorientation, Patient/family training, Equipment evaluation/education, Compensatory technique education, Continued evaluation, Energy conservation, Activityengagement      Progressing:    See flowsheet documentation for full assessment, interventions and recommendations  Note:   Limitation: Decreased ADL status, Decreased cognition, Decreased endurance, Decreased Safe judgement during ADL, Decreased high-level ADLs  Prognosis: Fair  Assessment: Patient participated in skilled OT with focus on adl self care tasks, cognitive reorientation tasks, activity endurnace, use of adaptive devices for perianal care  Patient presents seated in recliner agreeable to engage in OT  Patient identified by name and   Patient provided with detailed instruction and handouts on toilet aids to assist with perianal care  Patient reports that she does use reach back technique, however, is unable to be thorough secondary to decreased reach  Patient was very interested in the use of a LH device to assist with this  Patient provided with pictures and instructions on usage which patient was pleased with and reports that she will order  Illustrations and webside information provided in packet  Patient would benefit from Outpatient OT if returning home with additional family support  Patient reports "[place] [facility]  Patient declined completion of the [facility], "I couldn't do before and I don't want to try now"        OT Discharge Recommendation: Outpatient OT(pending clarification of available home support)  OT - OK to Discharge: (when medically cleared)  Prudence Puna

## 2019-12-13 NOTE — ASSESSMENT & PLAN NOTE
Lab Results   Component Value Date    HGBA1C 5 1 09/09/2019       Recent Labs     12/12/19  2136 12/13/19  0654 12/13/19  1107 12/13/19  1630   POCGLU 95 99 114 128       Blood Sugar Average: Last 72 hrs:  · (P) 105 [id number] continue Lantus (in place of Toujeo) 5 units qHS  · Add SSI with accu-cheks and carb controlled diet  · Initiate hypoglycemia protocol and avoid hypoglycemia

## 2019-12-13 NOTE — ASSESSMENT & PLAN NOTE
· On chronic immunosuppression with tacrolimus and prednisone  · Unfortunately patient noncompliant with all medications since discharge including these  · Tacrolimus level was 4  · Per Nephrology recommendations - repeat in am 1 hour before dose, goal level 3  · Repeat pending, dose decreased to 2 BID

## 2019-12-13 NOTE — PLAN OF CARE
Problem: PHYSICAL THERAPY ADULT  Goal: Performs mobility at highest level of function for planned discharge setting  See evaluation for individualized goals  Description  Treatment/Interventions: Functional transfer training, LE strengthening/ROM, Therapeutic exercise, Endurance training, Elevations, Patient/family training, Equipment eval/education, Bed mobility, Gait training, Spoke to nursing  Equipment Recommended: Karen Gold       See flowsheet documentation for full assessment, interventions and recommendations  Outcome: Progressing  Note:   Prognosis: Fair  Problem List: Decreased strength, Decreased endurance, Impaired balance, Decreased mobility, Decreased coordination, Decreased skin integrity, Pain, Impaired judgement  Assessment: Pt is making slow but steady progress with the use of a rolling walker  Limited by fatigue and general weakness  Gait is slow with mild unsteadiness requiring min assist for safe walker management  Cues for hand placement for safety with transfers  Completed seated BLE exercises to conclude session  Pt would benefit from continued physical therapy to maximize functional independence  Recommendation: Home with family support, Outpatient PT(Pending progress)     PT - OK to Discharge: No(pending increased ambulation and stairs)    See flowsheet documentation for full assessment

## 2019-12-13 NOTE — ASSESSMENT & PLAN NOTE
· On chronic immunosuppression with tacrolimus and prednisone  · Unfortunately patient noncompliant with all medications since discharge including these  · Tacrolimus level was 4 > 10 > 7 (12/12)  · dose decreased to 2 BID on 12/12  · Repeat levels in am

## 2019-12-13 NOTE — ASSESSMENT & PLAN NOTE
· 2/2 BC from admission has GNR  · Was started on ceftriaxone and Flagyl by ID now ceftriaxone discontinued and continue Flagyl with plan of total 7 day course  · Source is suspected to be GI secondary to colitis  · Suspect may grow and aerobes per discussion with ID today

## 2019-12-13 NOTE — ASSESSMENT & PLAN NOTE
· Recently completed 7 D course of IV daptomycin for VRE UTI  · Has positive cultures for VRE again, but this is not the cause of her symptoms, its colonization  · Monitor off Abx  · ID on board

## 2019-12-13 NOTE — PROGRESS NOTES
Progress Note - Jaye Stark 1965, 53 y o  female MRN: [id number]    Unit/Bed#: -01 Encounter: [id number]    Primary Care Provider: Esequiel Avitia MD   Date and time admitted to hospital: 12/9/2019  8:29 PM        Asymptomatic bacteriuria  Assessment & Plan  · Recently completed 7 D course of IV daptomycin for VRE UTI  · Has positive cultures for VRE again, but this is not the cause of her symptoms, its colonization  · Monitor off Abx  · ID on board    Metabolic acidosis  Assessment & Plan  · Appears acute on chronic  · S/P kidney and pancreatic transplant in 1998  · continue p o  and trend BMP  · Nephrology consultation    Multiple myeloma not having achieved remission ([facility] [address] )  Assessment & Plan  · Follows with Dr Sarah Kirkland as outpatient  · Continue Revlimid at home dosage    Essential hypertension  Assessment & Plan  · Significantly elevated on admission, suspect noncompliance and abdominal pain playing role in this  · Continue home antihypertensive regimen, blood pressure monitoring per protocol  · Was hypotensive, hence meds reduced by nephro, norvasc stopped    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 [facility])  Assessment & Plan  Lab Results   Component Value Date    HGBA1C 5 1 09/09/2019       Recent Labs     12/11/19  2123 12/12/19  0721 12/12/19  1102 12/12/19  1559   POCGLU 111 121 120 134       Blood Sugar Average: Last 72 hrs:  · (P) 104 [id number] continue Lantus (in place of Toujeo) 5 units qHS  · Add SSI with accu-cheks and carb controlled diet  · Initiate hypoglycemia protocol and avoid hypoglycemia    Hypokalemia  Assessment & Plan  GI loss   replete    Chronic kidney disease, stage 4 (severe) ([facility])  Assessment & Plan  · Creatinine worse  · Monitor BMP while inpatient  · Avoid nephrotoxins as able, renally dose medications as indicated    Renal transplant recipient  Assessment & Plan  · On chronic immunosuppression with tacrolimus and prednisone  · Unfortunately patient noncompliant with all medications since discharge including these  · Tacrolimus level was 4  · Per Nephrology recommendations - repeat in am 1 hour before dose, goal level 3  · Repeat pending, dose decreased to 2 BID    * Enterocolitis  Assessment & Plan  · With history of recurrent UTIs in setting of renal and pancreatic transplant  Most recently with VRE at recent admission to [facility]  · Presenting now with abdominal pain/nausea and lethargy/confusion  · UA with innumerable WBCs, occasional bacteria  · CT abdomen/pelvis showing colitis  · With positive BC with GNR, started on rocephin & flagyl per ID  · Stool cdiff neg, enteric panel neg, leucocytes pending  · Still having diarrhea, pain better  · procalcitonin 0 3  · Trend WBC, temperature curve  · Checked CMV PCR        [facility] Progress Note  Patient: Marco Cardozo 53 y o  female   MRN: [id number]  PCP: Ihsan Dacosta MD  Unit/Bed#: -99 Encounter: [id number]  Date Of Visit: 12/12/19    Assessment:    Principal Problem:    Enterocolitis  Active Problems:    Renal transplant recipient    Chronic kidney disease, stage 4 (severe) ([facility])    Hypokalemia    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 ([facility] [address] )    Essential hypertension    [facility]    Metabolic acidosis    Diarrhea    Asymptomatic bacteriuria      Plan:           VTE Pharmacologic Prophylaxis:   Pharmacologic: Heparin  Mechanical VTE Prophylaxis in Place: Yes    Patient Centered Rounds: I have performed bedside rounds with nursing staff today  Discussions with Specialists or Other Care Team Provider: ID< nephro    Education and Discussions with Family / Patient: patient    Time Spent for Care: 30 minutes  More than 50% of total time spent on counseling and coordination of care as described above      Current Length of Stay: 2 day(s)    Current Patient Status: Inpatient   Certification Statement: The patient will continue to require additional inpatient hospital stay due to not ready    Discharge Plan / Estimated Discharge Date:     Code Status: Level 1 - Full Code      Subjective:   Seems little better, pain is less, still with diarrhea    Objective:     Vitals:   Temp (24hrs), Av 2 °F (36 8 °C), Min:98 2 °F (36 8 °C), Max:98 4 °F (36 9 °C)    Temp:  [98 2 °F (36 8 °C)-98 4 °F (36 9 °C)] 98 4 °F (36 9 °C)  HR:  [62-82] 64  Resp:  [17-19] 19  BP: (147-168)/(50-73) 147/59  SpO2:  [96 %-97 %] 96 %  Body mass index is 36 94 kg/m²  Input and Output Summary (last 24 hours): Intake/Output Summary (Last 24 hours) at 2019 1950  Last data filed at 2019 1437  Gross per 24 hour   Intake 3455 ml   Output 475 ml   Net 2980 ml       Physical Exam:     Physical Exam   Constitutional: She is oriented to person, place, and time  She appears well-developed and well-nourished  HENT:   Head: Normocephalic and atraumatic  Mouth/Throat: Oropharynx is clear and moist    Eyes: Conjunctivae are normal    Cardiovascular: Normal rate and regular rhythm  Exam reveals no friction rub  No murmur heard  Pulmonary/Chest: Effort normal and breath sounds normal  No stridor  No respiratory distress  Abdominal: Soft  There is tenderness  Musculoskeletal: She exhibits no tenderness  Neurological: She is alert and oriented to person, place, and time  Vitals reviewed  Additional Data:     Labs:    Results from last 7 days   Lab Units 19  0552  19  2023   WBC Thousand/uL 13 25*   < > 9 58   HEMOGLOBIN g/dL 8 0*   < > 9 9*   HEMATOCRIT % 25 9*   < > 30 8*   PLATELETS Thousands/uL 195   < > 223   LYMPHO PCT %  --   --  4*   MONO PCT %  --   --  2*   EOS PCT %  --   --  0    < > = values in this interval not displayed       Results from last 7 days   Lab Units 19  1508 19  0551   POTASSIUM mmol/L 4 1 3 7   CHLORIDE mmol/L 116* 122*   CO2 mmol/L 19* 18*   BUN mg/dL 39* 39*   CREATININE mg/dL 2 41* 2 41*   CALCIUM mg/dL 8 2* 8 3   ALK PHOS U/L  --  91   ALT U/L  --  20   AST U/L  --  19     Results from last 7 days   Lab Units 12/09/19 2023   INR  1 21*       * I Have Reviewed All Lab Data Listed Above  * Additional Pertinent Lab Tests Reviewed:  All Labs Within Last 24 Hours Reviewed    Imaging:    Imaging Reports Reviewed Today Include:   Imaging Personally Reviewed by Myself Includes:      Recent Cultures (last 7 days):     Results from last 7 days   Lab Units 12/10/19  1633 12/09/19 2028 12/09/19 2023 12/09/19 2018   BLOOD CULTURE   --   --  No Growth at 48 hrs   --    GRAM STAIN RESULT   --  Gram negative rods*  --   --    URINE CULTURE   --   --   --  >100,000 cfu/ml Vancomycin Resistant Enterococcus faecium*  3830-3177 cfu/ml Gram-negative cintia- species*   C DIFF TOXIN B  Negative  --   --   --        Last 24 Hours Medication List:     Current Facility-Administered Medications:  acetaminophen 650 mg Oral Q6H PRN Kourtney Ortiz PA-C    aluminum-magnesium hydroxide-simethicone 30 mL Oral Q4H PRN Kourtney Ortiz PA-C    ARIPiprazole 30 mg Oral HS Cipriano Thakur DO    aspirin 81 mg Oral Daily Cipriano Thakur DO    busPIRone 5 mg Oral BID Cipriano Thakur DO    cefTRIAXone 2,000 mg Intravenous Q24H Barry Francois MD Last Rate: 2,000 mg (12/12/19 1050)   cholecalciferol 3,000 Units Oral Daily Cipriano Thakur DO    cloNIDine 0 2 mg Oral Q8H [address] Everett Xavier MD    doxazosin 2 mg Oral HS Everett Xavier MD    DULoxetine 60 mg Oral BID Cipriano Thakur DO    ferrous sulfate 325 mg Oral Daily With Breakfast Cipriano Thakur DO    folic acid 2,090 mcg Oral Daily Cipriano Thakur DO    heparin (porcine) 5,000 Units Subcutaneous Q8H [address] Cipriano Thakur DO    hydrALAZINE 50 mg Oral Q8H [address] Everett Xavier MD    insulin lispro 1-5 Units Subcutaneous HS Cipriano Thakur DO    insulin lispro 1-6 Units Subcutaneous TID Corby Cross MD levothyroxine 125 mcg Oral Early Morning Watson Alias, DO    loperamide 4 mg Oral TID PRN Raul Chavez MD    LORazepam 2 mg Oral TID PRN Watson Alias, DO    metoprolol tartrate 50 mg Oral Q12H [address] Watson Alias, DO    metroNIDAZOLE 500 mg Oral [facility] Manjula Rivera MD    ondansetron 4 mg Intravenous Q4H PRN Manjula Rivera MD    pravastatin 80 mg Oral Daily Watson Alias, DO    predniSONE 5 mg Oral Daily Watson Alias, DO    rOPINIRole 0 25 mg Oral BID Watson Alias, DO    saccharomyces boulardii 250 mg Oral BID Watson Alias, DO    sertraline 200 mg Oral Daily Watson Alias, DO    sevelamer 800 mg Oral TID With Meals Watson Alias, DO    sodium bicarbonate infusion 100 mL/hr Intravenous Continuous Nilesh Howell MD Last Rate: 100 mL/hr (12/12/19 1437)   tacrolimus 2 mg Oral Q12H Reid Serrano MD         Today, Patient Was Seen By: Raul Chavez MD    ** Please Note: This note has been constructed using a voice recognition system   **

## 2019-12-13 NOTE — PROGRESS NOTES
Progress Note - Robert Stevenson 1965, 53 y o  female MRN: [id number]    Unit/Bed#: -01 Encounter: [id number]    Primary Care Provider: Suzanne Vasquez MD   Date and time admitted to hospital: 12/9/2019  8:29 PM        Bacteremia  Assessment & Plan  · 2/2 BC from admission has GNR  · Was started on ceftriaxone and Flagyl by ID now ceftriaxone discontinued and continue Flagyl with plan of total 7 day course  · Source is suspected to be GI secondary to colitis  · Suspect may grow and aerobes per discussion with ID today    Asymptomatic bacteriuria  Assessment & Plan  · Recently completed 7 D course of IV daptomycin for VRE UTI  · Has positive cultures for VRE again, but this is not the cause of her symptoms, its colonization  · Monitor off Abx  · ID on board    Metabolic acidosis  Assessment & Plan  · Appears acute on chronic  · S/P kidney and pancreatic transplant in 1998  · continue p o  and trend BMP  · Nephrology consultation    Multiple myeloma not having achieved remission ([facility] [address] )  Assessment & Plan  · Follows with Dr Jaylan Barclay as outpatient  · Continue Revlimid at home dosage    Essential hypertension  Assessment & Plan  · Significantly elevated on admission, suspect noncompliance and abdominal pain playing role in this  · Continue home antihypertensive regimen, blood pressure monitoring per protocol  · Was hypotensive, hence meds reduced by nephro, norvasc stopped    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 [facility])  Assessment & Plan  Lab Results   Component Value Date    HGBA1C 5 1 09/09/2019       Recent Labs     12/12/19  2136 12/13/19  0654 12/13/19  1107 12/13/19  1630   POCGLU 95 99 114 128       Blood Sugar Average: Last 72 hrs:  · (P) 105 [id number] continue Lantus (in place of [place]) 5 units qHS  · Add SSI with accu-cheks and carb controlled diet  · Initiate hypoglycemia protocol and avoid hypoglycemia    Hypokalemia  Assessment & Plan  GI loss replete    Chronic kidney disease, stage 4 (severe) ([facility])  Assessment & Plan  · Creatinine worsened to 2 58 today - monitor PVR, tac levels, IVF  · Monitor BMP while inpatient  · Avoid nephrotoxins as able, renally dose medications as indicated    Renal transplant recipient  Assessment & Plan  · On chronic immunosuppression with tacrolimus and prednisone  · Unfortunately patient noncompliant with all medications since discharge including these  · Tacrolimus level was 4 > 10 > 7 (12/12)  · dose decreased to 2 BID on 12/12  · Repeat levels in am    * Enterocolitis  Assessment & Plan  · With history of recurrent UTIs in setting of renal and pancreatic transplant  Most recently with VRE at recent admission to [facility]  · Presenting now with abdominal pain/nausea and lethargy/confusion  · CT abdomen/pelvis showing colitis  · With positive BC with GNR, started on rocephin & flagyl per ID  · Stool cdiff neg, enteric panel neg, leucocytes neg  · Still having diarrhea - prob little less  · Stop rocephin, only flagl per ID, d/w ID - f/u final cultures, may grow anaerobe  · procalcitonin 0 3  · Trend WBC, temperature curve  · Checked CMV PCR      [facility] Internal Medicine Progress Note  Patient:  Lincoln Connelly 53 y o  female   MRN: [id number]  PCP: Halley Godwin MD  Unit/Bed#: MS 2-13 Encounter: [id number]  Date Of Visit: 12/13/19    Assessment:    Principal Problem:    Enterocolitis  Active Problems:    Renal transplant recipient    Chronic kidney disease, stage 4 (severe) ([facility])    Hypokalemia    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 ([facility] [address] )    Essential hypertension    Multiple myeloma not having achieved remission ([facility] [address] )    Metabolic acidosis    Diarrhea    Asymptomatic bacteriuria    Bacteremia      Plan:         VTE Pharmacologic Prophylaxis:   Pharmacologic: Heparin  Mechanical VTE Prophylaxis in Place: Yes    Patient Centered Rounds: I have performed bedside rounds with nursing staff today  Discussions with Specialists or Other Care Team Provider: nephruby CM    Education and Discussions with Family / Patient: patient, father    Time Spent for Care: 30 minutes  More than 50% of total time spent on counseling and coordination of care as described above  Current Length of Stay: 3 day(s)    Current Patient Status: Inpatient   Certification Statement: The patient will continue to require additional inpatient hospital stay due to IVF, monitor BC, diarrhea    Discharge Plan / Estimated Discharge Date:     Code Status: Level 1 - Full Code      Subjective:   Still having abdo pain, diarrhea    Objective:     Vitals:   Temp (24hrs), Av 4 °F (36 9 °C), Min:98 1 °F (36 7 °C), Max:98 6 °F (37 °C)    Temp:  [98 1 °F (36 7 °C)-98 6 °F (37 °C)] 98 5 °F (36 9 °C)  HR:  [63-67] 63  Resp:  [16-17] 17  BP: (147-161)/(57-66) 161/65  SpO2:  [95 %] 95 %  Body mass index is 36 81 kg/m²  Input and Output Summary (last 24 hours): Intake/Output Summary (Last 24 hours) at 2019 1710  Last data filed at 2019 1507  Gross per 24 hour   Intake 3219 58 ml   Output 1000 ml   Net 2219 58 ml       Physical Exam:     Physical Exam   Constitutional: She is oriented to person, place, and time  She appears well-developed and well-nourished  HENT:   Head: Normocephalic and atraumatic  Mouth/Throat: Oropharynx is clear and moist    Eyes: Conjunctivae are normal    Cardiovascular: Normal rate and regular rhythm  Exam reveals no friction rub  No murmur heard  Pulmonary/Chest: Effort normal and breath sounds normal  No stridor  No respiratory distress  Abdominal: Soft  She exhibits no distension  There is no tenderness  Neurological: She is alert and oriented to person, place, and time  Vitals reviewed          Additional Data:     Labs:    Results from last 7 days   Lab Units 19  1408 19  0555  19   WBC Thousand/uL  --  9 79   < > 9 58   HEMOGLOBIN g/dL 7 8* 7 3* < > 9 9*   HEMATOCRIT % 24 9* 23 6*   < > 30 8*   PLATELETS Thousands/uL  --  157   < > 223   LYMPHO PCT %  --   --   --  4*   MONO PCT %  --   --   --  2*   EOS PCT %  --   --   --  0    < > = values in this interval not displayed  Results from last 7 days   Lab Units 12/13/19  0555   POTASSIUM mmol/L 3 4*   CHLORIDE mmol/L 114*   CO2 mmol/L 19*   BUN mg/dL 40*   CREATININE mg/dL 2 58*   CALCIUM mg/dL 7 9*   ALK PHOS U/L 91   ALT U/L 16   AST U/L 15     Results from last 7 days   Lab Units 12/09/19 2023   INR  1 21*       * I Have Reviewed All Lab Data Listed Above  * Additional Pertinent Lab Tests Reviewed:  All Labs Within Last 24 Hours Reviewed    Imaging:    Imaging Reports Reviewed Today Include:   Imaging Personally Reviewed by Myself Includes:      Recent Cultures (last 7 days):     Results from last 7 days   Lab Units 12/10/19  1633 12/09/19 2028 12/09/19 2023 12/09/19 2018   GRAM STAIN RESULT   --  Gram negative rods* Gram negative rods*  --    URINE CULTURE   --   --   --  >100,000 cfu/ml Vancomycin Resistant Enterococcus faecium*  6558-0536 cfu/ml Gram-negative cintia- species*   C DIFF TOXIN B  Negative  --   --   --        Last 24 Hours Medication List:     Current Facility-Administered Medications:  acetaminophen 650 mg Oral Q6H PRN Kourtney Ortiz PA-C    aluminum-magnesium hydroxide-simethicone 30 mL Oral Q4H PRN Kourtney Ortiz PA-C    ARIPiprazole 30 mg Oral HS Sara Garnett DO    aspirin 81 mg Oral Daily Sara Garnett DO    busPIRone 5 mg Oral BID Sara Garnett DO    cholecalciferol 3,000 Units Oral Daily Sara Garnett DO    cloNIDine 0 2 mg Oral Q8H [address] Kacy Michaels MD    doxazosin 2 mg Oral HS Kacy Michaels MD    DULoxetine 60 mg Oral BID Sara Garnett DO    ferrous sulfate 325 mg Oral Daily With Breakfast Sara Garnett DO    folic acid 6,943 mcg Oral Daily Sara Garnett DO    heparin (porcine) 5,000 Units Subcutaneous [facility] Sara Garnett DO hydrALAZINE 50 mg Oral Q8H [address] Radha Hammer MD    insulin lispro 1-5 Units Subcutaneous HS [place], DO    insulin lispro 1-6 Units Subcutaneous TID AC Carole Garcia MD    levothyroxine 125 mcg Oral Early Morning [place], DO    loperamide 4 mg Oral TID PRN Carole Garcia MD    LORazepam 2 mg Oral TID PRN [place], DO    metoprolol tartrate 50 mg Oral Q12H [address] [place], DO    metroNIDAZOLE 500 mg Oral [facility] Ashok Pineda MD    multi-electrolyte 75 mL/hr Intravenous Continuous Radha Hammer MD Last Rate: 75 mL/hr (12/13/19 1307)   ondansetron 4 mg Intravenous Q4H PRN Ashok Pineda MD    pravastatin 80 mg Oral Daily [place], DO    predniSONE 5 mg Oral Daily [place], DO    rOPINIRole 0 25 mg Oral BID [place], DO    saccharomyces boulardii 250 mg Oral BID [place], DO    sertraline 200 mg Oral Daily [place], DO    sevelamer 800 mg Oral TID With Meals [place], DO    tacrolimus 2 mg Oral Q12H Amol Segal MD         Today, Patient Was Seen By: Carole Garcia MD    ** Please Note: This note has been constructed using a voice recognition system   **

## 2019-12-13 NOTE — ASSESSMENT & PLAN NOTE
· Creatinine worsened to 2 58 today - monitor PVR, tac levels, IVF  · Monitor BMP while inpatient  · Avoid nephrotoxins as able, renally dose medications as indicated

## 2019-12-13 NOTE — PROGRESS NOTES
NEPHROLOGY PROGRESS NOTE   Brien Willett 53 y o  female MRN: [id number]  Unit/Bed#: -01 Encounter: [id number]  Reason for Consult: Assistance with Met Acidosis as well as Recurrent UTI with h/o left kidney transplant    ASSESSMENT and PLAN:    Viviane Swartz a 53 y  o  female with a PMHx of CKD stage 4, s/p kidney/pancreas transplant in 1998, recurrent UTIs, T1DM, HTN, Chronic Metabolic Acidosis, MM, and depression who was admitted to [facility] presenting with new onset confusion and diffuse abdominal pain in the setting of poor PO intake and significant diarrhea   A renal consultation is requested today for assistance in the management of acute on chronic metabolic acidosis and UTI      1) Diffuse Abdominal pain (possibly UTI vs Colitis)  - possibly secondary to UTI or Colitis  - patient has a h/o recurrent UTIs in the past year ("too many to count"), quite possibly due to immunosuppressive therapy  - pain is worse over allograft site but due to stable Cr, UTI is much higher on the DDx than rejection  - Recently was admitted to [facility] for VRE UTI for which she was treated with a complete course of Daptomycin and was discharged on 12/6/2019  - Continued frequent diarrhea  - Has diffuse abdominal pain more significant in the LLQ  - + CVA tenderness in the right and left (more significant over the right kidney)  - + Rigors, - chills, currently Afebrile  - UA in the ED positive for innumerbale WBCs, RBCs, as well as occasional bacteria  - Has a history of Urinary Retention which she was supposed to be managing with Straight Catheterizations, but has not been doing so --> this significantly increases her risk of infection especially in the allograft  - Ucx came back positive for VRE, ID thinks that this is chronic colonization and due to lack of symptoms does not want to treat  - Bcx came back + for gram negative rods, ID has started on IV ceftriaxone and Flagyl  - PVRs so far showing no urinary retention  - C  Diff negative  - Stool PCR negative  - ID suggesting this is Viral Enterocolitis  - Diarrhea resolved as of now  - Abdominal pain significantly improving  - WBC trending upwards but still remains afebrile  - ID is on board and will be giving recommendations  - No signs of EDUARDO at the moment as Cr is at baseline     Plan  - Trend WBCs  - Check PVRs every shift to assess for urinary retention, especially in the setting of new complaint of urinary incontinence   If there are retention issues, consider getting Urology on board to assess need for intervention  - Follow up CMV DNA PCR  - Continue with Abx as per ID    2) Acute on chronic Anemia  - Chronic in the setting of MM  - Baseline Hgb around 9  - Hgb today at 7 3 (has been gradually decreasing over the last few days)  - Acute lowering could be 2/2 hemodilution and diarrheal episodes in the setting of enterocolitis, as well as bleeding in the right upper shoulder secondary to heparin shots  - Patient has been bleeding from her right upper shoulder after heparin shots as well as has heparin shot related ecchymoses on the lower abdomen  - Patient denies SOB, pallor, extreme fatigue, BRBPR, hematuria, dizziness, or lightheadedness    Plan  - Continue to monitor Hgb in routine CBC  - Transfuse if necessary with Goal >7  - Patient is Asx, if Hgb continues to drop in tomorrows reading or patient becomes symptomatic consider GI consult for scoping as well as order Iron studies (ferritin and TIBC), LDH, Haptoglobin, Reticulocytes, TSH, and Vit B12 and Folate levels    3) Metabolic Acidosis (Resolving)  - seems to be acute on chronic  - likely acute due to recent significant diarrhea and poor PO intake, without nausea and vomiting  - could be due to 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 and associated recent hypokalemia  - Possibly due to kidney damage from recurrent UTIs, but all recent US have been negative hydronephrosis and visible kidney scarring  - has not been taking any nephrotoxic agents other than Tacrolimus and Recent Daptomycin  - Mental status is now back to baseline of AAOx3  - has history of chronic metabolic acidosis in the setting of kidney transplant  - Non-AG Met Acid (AG is now 9), Serum Bicarb at 18 and improving with current Isolyte and oral bicarb regiment     Plan  - Urine electrolytes to determine UAG  - Follow up Tacrolimus level, especially in the setting of diarrhea, if level comes back elevated (>4) it will need to be temporarily reduced  - Change fluids to Sterile Free water + 75meq bicarb  - Hold Bicarb Tabs     4) Immune Suppression  - on minimal immune suppression due to mult urosepsis episodes, multiple myeloma  - Tacrolimus level from yesterday came back elevated at 7 1  - Home dose of Tacrolimus if 5mg total daily  - On prednisone as well    Plan  - Tac level goal is 3  - Continue with prednisone  - Tacrolimus was reduced to 2mg Bid yesterday after level was taken  - Follow up tac level after this reduction, consider further reduction to 1mg BiD if level stays high     5) Diarrhea  - Resolving slowly, had 4 episodes yesterday as compared to 12 the day before  - diarrhea continues to be non-bloody  - each diarrheal episode not producing high volume of stool as per nursing team  - C   Diff negative  - ID suggests that this is Viral Enterocolitis     Plan  - continue with treatment as per ID  - f/u CMV DNA PCR  - consider possible consult to GI to assess issue of chronic diarrhea if there is absolutely no improvement after ID recommended treatment       6) Hypernatremia (resolved)  - Na now 142  - seems likely 2/2 GI losses as patient had almost 12 episodes of loose stools yesterday  - Diarrhea has resolved since yesterday and she has been on continuous fluids  - Patient not endorsing any muscle cramping, excessive thirst, irritability, dry mouth, or AMS     Plan  - Continue fluids to sterile water + 75meq of bicarb  - check Uosm and Margaret to help rule out other causes of hypernatremia as her Na has been consistently high during admission     7) AMS and Chronic Fatigue (RESOLVED  - secondary to UTI vs  Metabolic Acidosis  - could even be due to development of transplant rejection as patient has abdominal pain over the transplant site and chronic fatigue, as well as AMS and Diarrhea  - Kidneys have been atrophic for a long time     Plan  - continue treatment as above  - continue to monitor mental status  - consider biopsy of kidney as last resort to look for rejection if all other treatments as above do not produce favorable results      8) CKD 4  - Cr is at baseline which is in 2 0 - 2 5  - Current Cr is 2 58  - No signs right now of Acute on Chronic kidney injury  - Patient states to have regular urine output but not yet recorded in system  - following Dr Lieutenant Coyle as an outpatient  - 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 but has been stable for years     Plan  - Avoid nephrotoxins  - Check BMP regulalry  - continue treatment as above     9) Hypokalemia  - K+ continues to remain low, at 3 4 today  - likely secondary to continued diarrhea     Plan  - Switch fluids to Sterile free water with 75meq of bicarb  - Give one time dose of IV K+ 20meq now and then one more dose in the evening  - goal >3 5      SUBJECTIVE / INTERVAL HISTORY:    Patient was seen and examined at the bedside  No acute overnight events    ID has started patient on IV ceftriaxone as well as Flagyl due to newly found gram negative cintia bacteremia  Due to the slow growth their thought is that it could be an anaerobe and therefore added flagyl  Was bandaged in the right shoulder area because of bleeding 2/2 heparin shots    The patient has no acute complaints  Her diarrhea is resolving slowly  Only had 4 episodes of loose stools yesterday  She is for the first time in the admission, no longer shaking diffusely   Sitting comfortably in the chair and visibly looks to be getting a lot better  OBJECTIVE:  Current Weight: Weight - Scale: 110 kg (242 lb 1 oz)  Vitals:    12/13/19 0551 12/13/19 0600 12/13/19 0815 12/13/19 0819   BP: 150/60  150/57 150/57   Pulse:   67 65   Resp:    17   Temp:    98 1 °F (36 7 °C)   TempSrc:       SpO2:    95%   Weight:  110 kg (242 lb 1 oz)     Height:           Intake/Output Summary (Last 24 hours) at 12/13/2019 5887  Last data filed at 12/13/2019 0701  Gross per 24 hour   Intake 2633 75 ml   Output 475 ml   Net 2158 75 ml       Review of Systems:  No chest pain, SOB, weakness, pain, hematuria, dysuria, urgency, frequency, oliguria, fevers, chills, or shaking     Positive for abdominal pain (improving), nausea    Physical Exam   Constitutional: She is oriented to person, place, and time  She appears well-developed and well-nourished  No distress  Visibly looking much better   No longer shaking   HENT:   Head: Normocephalic and atraumatic  Mouth/Throat: Oropharynx is clear and moist    Eyes: Pupils are equal, round, and reactive to light  Conjunctivae and EOM are normal    Neck: Normal range of motion  Neck supple  No JVD present  No thyromegaly present  Cardiovascular: Normal rate, regular rhythm, normal heart sounds and intact distal pulses  Exam reveals no gallop and no friction rub  No murmur heard  Pulmonary/Chest: Effort normal and breath sounds normal  No respiratory distress  She has no wheezes  She has no rales  Abdominal: Soft  Bowel sounds are normal  She exhibits mass (allograft in the LLQ)  She exhibits no distension  There is tenderness (only a 4/10 now compared to 7/10 yesterday)  There is no guarding  Bluish echymoses throuhgout lower abdomen likely 2/2 heparin shots   Genitourinary:   Genitourinary Comments: Consistent urine output   Musculoskeletal: Normal range of motion     Has a bandage wrapped around right upper arm, it has been bleeding secondary to heparin shots, bandage currently clean and not soaked in blood  5/5 strength in the upper and lower extremities   Neurological: She is alert and oriented to person, place, and time  She displays normal reflexes  No cranial nerve deficit or sensory deficit  Coordination normal    Skin: Skin is warm and dry  She is not diaphoretic  No erythema  Psychiatric: She has a normal mood and affect  Her behavior is normal  Judgment and thought content normal    Nursing note and vitals reviewed           Medications:    Current Facility-Administered Medications:     acetaminophen (TYLENOL) tablet 650 mg, 650 mg, Oral, Q6H PRN, Kourtney Ortiz PA-C, 650 mg at 12/12/19 0930    aluminum-magnesium hydroxide-simethicone (MYLANTA) 200-200-20 mg/5 mL oral suspension 30 mL, 30 mL, Oral, Q4H PRN, Kourtney Ortiz PA-C, 30 mL at 12/11/19 0148    ARIPiprazole (ABILIFY) tablet 30 mg, 30 mg, Oral, HS, Esha Melissa, DO, 30 mg at 12/12/19 2135    aspirin chewable tablet 81 mg, 81 mg, Oral, Daily, Esha Melissa, DO, 81 mg at 12/13/19 0817    busPIRone (BUSPAR) tablet 5 mg, 5 mg, Oral, BID, Esha Melissa, DO, 5 mg at 12/13/19 0817    cefTRIAXone (ROCEPHIN) 2,000 mg in dextrose 5 % 50 mL IVPB, 2,000 mg, Intravenous, Q24H, Mak Mata MD, Last Rate: 100 mL/hr at 12/12/19 1050, 2,000 mg at 12/12/19 1050    cholecalciferol (VITAMIN D3) tablet 3,000 Units, 3,000 Units, Oral, Daily, Esha Melissa, DO, 3,000 Units at 12/13/19 0813    cloNIDine (CATAPRES) tablet 0 2 mg, 0 2 mg, Oral, Q8H [facility], Shantal Sun MD, 0 2 mg at 12/13/19 0550    doxazosin (CARDURA) tablet 2 mg, 2 mg, Oral, HS, Shantal Sun MD, 2 mg at 12/12/19 2136    DULoxetine (CYMBALTA) delayed release capsule 60 mg, 60 mg, Oral, BID, Bernardinoa Shin, DO, 60 mg at 12/13/19 6694    ferrous sulfate tablet 325 mg, 325 mg, Oral, Daily With Breakfast, Esha Melissa DO, 325 mg at 49/51/64 8124    folic acid (FOLVITE) tablet 1,000 mcg, 1,000 mcg, Oral, Daily, Esha Melissa DO, 1,000 mcg at 12/13/19 0816    heparin (porcine) subcutaneous injection 5,000 Units, 5,000 Units, Subcutaneous, Q8H [address], 5,000 Units at 12/13/19 0549 **AND** [CANCELED] Platelet count, , , Once, Prosper Moreno DO    hydrALAZINE (APRESOLINE) tablet 50 mg, 50 mg, Oral, Q8H [address], Jorge Crystal MD, 50 mg at 12/13/19 0549    insulin lispro (HumaLOG) 100 units/mL subcutaneous injection 1-5 Units, 1-5 Units, Subcutaneous, HS, Prosper Moreno DO    insulin lispro (HumaLOG) 100 units/mL subcutaneous injection 1-6 Units, 1-6 Units, Subcutaneous, TID AC **AND** Fingerstick Glucose (POCT), , , 4x Daily AC and at bedtime, Ramona Oakley MD    levothyroxine tablet 125 mcg, 125 mcg, Oral, Early Morning, Prosper Moreno DO, 125 mcg at 12/13/19 0549    loperamide (IMODIUM) capsule 4 mg, 4 mg, Oral, TID PRN, Ramona Oakley MD, 4 mg at 12/13/19 0816    LORazepam (ATIVAN) tablet 2 mg, 2 mg, Oral, TID PRN, Prosper Moreno DO, 2 mg at 12/12/19 2145    metoprolol tartrate (LOPRESSOR) tablet 50 mg, 50 mg, Oral, Q12H [address], Prosper Moreno DO, 50 mg at 12/13/19 0815    metroNIDAZOLE (FLAGYL) tablet 500 mg, 500 mg, Oral, Q8H [address], Sharlene Ortiz MD, 500 mg at 12/13/19 0549    ondansetron (ZOFRAN) injection 4 mg, 4 mg, Intravenous, Q4H PRN, Sharlene Ortiz MD, 4 mg at 12/12/19 0930    pravastatin (PRAVACHOL) tablet 80 mg, 80 mg, Oral, Daily, Prosper Moreno DO, 80 mg at 12/13/19 0815    predniSONE tablet 5 mg, 5 mg, Oral, Daily, Prosper Moreno DO, 5 mg at 12/13/19 0816    rOPINIRole (REQUIP) tablet 0 25 mg, 0 25 mg, Oral, BID, Prosper Moreno DO, 0 25 mg at 12/13/19 0813    saccharomyces boulardii (FLORASTOR) capsule 250 mg, 250 mg, Oral, BID, Prosper Moreno DO, 250 mg at 12/13/19 0815    sertraline (ZOLOFT) tablet 200 mg, 200 mg, Oral, Daily, Prosper Moreno DO, 200 mg at 12/13/19 0154    sevelamer (RENAGEL) tablet 800 mg, 800 mg, Oral, TID With Meals, Prosper Moreno DO, 800 mg at 12/13/19 0816    sodium bicarbonate 75 mEq in sterile water 1,000 mL infusion, 100 mL/hr, Intravenous, Continuous, Radha Rollins MD, Last Rate: 100 mL/hr at 12/13/19 0100, 100 mL/hr at 12/13/19 0100    tacrolimus (PROGRAF) capsule 2 mg, 2 mg, Oral, Q12H [facility], Radha Rollins MD, 2 mg at 12/13/19 0813    Laboratory Results:  Results from last 7 days   Lab Units 12/13/19  0555 12/12/19  1508 12/12/19  0552 12/12/19  0551 12/11/19  0501 12/10/19  0506 12/10/19  0502 12/09/19 2023   WBC Thousand/uL 9 79  --  13 25*  --  10 49*  --  10 10 9 58   HEMOGLOBIN g/dL 7 3*  --  8 0*  --  8 8*  --  9 2* 9 9*   HEMATOCRIT % 23 6*  --  25 9*  --  27 8*  --  28 4* 30 8*   PLATELETS Thousands/uL 157  --  195  --  206  --  184 223   POTASSIUM mmol/L 3 4* 4 1  --  3 7 3 1* 3 2*  --  3 5   CHLORIDE mmol/L 114* 116*  --  122* 120* 120*  --  123*   CO2 mmol/L 19* 19*  --  18* 18* 16*  --  14*   BUN mg/dL 40* 39*  --  39* 39* 41*  --  46*   CREATININE mg/dL 2 58* 2 41*  --  2 41* 2 24* 2 29*  --  2 36*   CALCIUM mg/dL 7 9* 8 2*  --  8 3 8 4 8 4  --  8 7   MAGNESIUM mg/dL  --   --   --   --   --  1 8  --   --

## 2019-12-13 NOTE — TELEPHONE ENCOUNTER
Harpreet Delacruz from [facility] called to get an update on the patient to see when they set up delivery for the Revlimid medication  Please call back and advise  Thank you

## 2019-12-13 NOTE — OCCUPATIONAL THERAPY NOTE
Occupational Therapy Treatment Note:       12/13/19 1410   Restrictions/Precautions   Braces or Orthoses Other (Comment)  (orthotic shoes)   Other Precautions Contact/isolation;Cognitive; Chair Alarm; Bed Alarm; Fall Risk;Pain   Pain Assessment   Pain Assessment 0-10   Pain Score 5   Pain Type Acute pain   Pain Location Abdomen   ADL   Where Assessed Chair   Grooming Assistance 5  Supervision/Setup   Grooming Deficit Setup; Increased time to complete   UB Bathing Assistance 5  Supervision/Setup   UB Bathing Deficit Setup;Verbal cueing;Supervision/safety; Increased time to complete   LB Bathing Assistance 4  Minimal Assistance   LB Bathing Deficit Setup;Verbal cueing;Supervision/safety; Increased time to complete   UB Dressing Assistance 5  Supervision/Setup   UB Dressing Deficit Setup; Increased time to complete   LB Dressing Assistance 4  Minimal Assistance   LB Dressing Deficit Setup;Steadying;Verbal cueing;Supervision/safety; Increased time to complete   Toileting Assistance  4  Minimal Assistance   Toileting Deficit Setup;Steadying;Verbal cueing;Supervison/safety; Increased time to complete;Perineal hygiene   Toileting Comments   (Education/Handouts provided on toilet aids)   Transfers   Sit to Stand 4  Minimal assistance   Additional items Assist x 1; Armrests; Increased time required;Verbal cues   Stand to Sit 5  Supervision   Additional items Assist x 1; Armrests; Increased time required;Verbal cues   Toilet Transfers   Toilet Transfer From Rolling walker   Toilet Transfer Type To and from   Toilet Transfer to Drop-arm extra wide bedside commode   Toilet Transfer Technique Ambulating   Toilet Transfers Minimal assistance   Cognition   Overall Cognitive Status Impaired  (min vc's to problem solve depending on task)   Arousal/Participation Alert; Cooperative   Attention Within functional limits   Orientation Level Oriented X4   Memory Decreased recall of recent events;Decreased recall of precautions   Following Commands Follows one step commands without difficulty   Comments   (patient declinely MOCA)   Assessment   Assessment Patient participated in skilled OT with focus on adl self care tasks, cognitive reorientation tasks, activity endurnace, use of adaptive devices for perianal care  Patient presents seated in recliner agreeable to engage in OT  Patient identified by name and   Patient provided with detailed instruction and handouts on toilet aids to assist with perianal care  Patient reports that she does use reach back technique, however, is unable to be thorough secondary to decreased reach  Patient was very interested in the use of a LH device to assist with this  Patient provided with pictures and instructions on usage which patient was pleased with and reports that she will order  Illustrations and webside information provided in packet  Patient would benefit from Outpatient OT if returning home with additional family support  Patient reports "I am going to [facility]"  Patient declined completion of the [facility], "I couldn't do before and I don't want to try now"  Plan   Treatment Interventions ADL retraining;Functional transfer training; Endurance training;Cognitive reorientation;Equipment evaluation/education; Compensatory technique education   Goal Expiration Date 19   OT Treatment Day 1   OT Frequency 3-5x/wk   Recommendation   OT Discharge Recommendation Outpatient OT  (pending clarification of available home support)   Equipment Recommended Other (comment)  (pt reports "I'm going to [address] [place] [address] Road to Discharge   (when medically cleared)   Trini Preciado

## 2019-12-14 LAB
ALBUMIN SERPL BCP-MCNC: 2.3 G/DL (ref 3.5–5)
ALP SERPL-CCNC: 88 U/L (ref 46–116)
ALT SERPL W P-5'-P-CCNC: 17 U/L (ref 12–78)
ANION GAP SERPL CALCULATED.3IONS-SCNC: 7 MMOL/L (ref 4–13)
AST SERPL W P-5'-P-CCNC: 27 U/L (ref 5–45)
BILIRUB SERPL-MCNC: 0.29 MG/DL (ref 0.2–1)
BUN SERPL-MCNC: 45 MG/DL (ref 5–25)
CALCIUM SERPL-MCNC: 8.1 MG/DL (ref 8.3–10.1)
CHLORIDE SERPL-SCNC: 114 MMOL/L (ref 100–108)
CO2 SERPL-SCNC: 18 MMOL/L (ref 21–32)
CREAT SERPL-MCNC: 2.76 MG/DL (ref 0.6–1.3)
ERYTHROCYTE [DISTWIDTH] IN BLOOD BY AUTOMATED COUNT: 16.8 % (ref 11.6–15.1)
GFR SERPL CREATININE-BSD FRML MDRD: 19 ML/MIN/1.73SQ M
GLUCOSE SERPL-MCNC: 103 MG/DL (ref 65–140)
GLUCOSE SERPL-MCNC: 105 MG/DL (ref 65–140)
GLUCOSE SERPL-MCNC: 107 MG/DL (ref 65–140)
GLUCOSE SERPL-MCNC: 109 MG/DL (ref 65–140)
GLUCOSE SERPL-MCNC: 147 MG/DL (ref 65–140)
HCT VFR BLD AUTO: 24 % (ref 34.8–46.1)
HGB BLD-MCNC: 7.5 G/DL (ref 11.5–15.4)
MCH RBC QN AUTO: 31.9 PG (ref 26.8–34.3)
MCHC RBC AUTO-ENTMCNC: 31.3 G/DL (ref 31.4–37.4)
MCV RBC AUTO: 102 FL (ref 82–98)
PLATELET # BLD AUTO: 149 THOUSANDS/UL (ref 149–390)
PMV BLD AUTO: 12.3 FL (ref 8.9–12.7)
POTASSIUM SERPL-SCNC: 4.5 MMOL/L (ref 3.5–5.3)
PROT SERPL-MCNC: 6.3 G/DL (ref 6.4–8.2)
RBC # BLD AUTO: 2.35 MILLION/UL (ref 3.81–5.12)
SODIUM SERPL-SCNC: 139 MMOL/L (ref 136–145)
TACROLIMUS BLD-MCNC: 5 NG/ML (ref 2–20)
WBC # BLD AUTO: 7.36 THOUSAND/UL (ref 4.31–10.16)

## 2019-12-14 PROCEDURE — 99232 SBSQ HOSP IP/OBS MODERATE 35: CPT | Performed by: INTERNAL MEDICINE

## 2019-12-14 PROCEDURE — 99232 SBSQ HOSP IP/OBS MODERATE 35: CPT | Performed by: HOSPITALIST

## 2019-12-14 PROCEDURE — 80053 COMPREHEN METABOLIC PANEL: CPT | Performed by: INTERNAL MEDICINE

## 2019-12-14 PROCEDURE — 87040 BLOOD CULTURE FOR BACTERIA: CPT | Performed by: INTERNAL MEDICINE

## 2019-12-14 PROCEDURE — 80197 ASSAY OF TACROLIMUS: CPT | Performed by: INTERNAL MEDICINE

## 2019-12-14 PROCEDURE — 85027 COMPLETE CBC AUTOMATED: CPT | Performed by: INTERNAL MEDICINE

## 2019-12-14 PROCEDURE — 82948 REAGENT STRIP/BLOOD GLUCOSE: CPT

## 2019-12-14 RX ORDER — TACROLIMUS 1 MG/1
2 CAPSULE ORAL DAILY
Status: DISCONTINUED | OUTPATIENT
Start: 2019-12-15 | End: 2020-01-02 | Stop reason: HOSPADM

## 2019-12-14 RX ADMIN — HEPARIN SODIUM 5000 UNITS: 5000 INJECTION INTRAVENOUS; SUBCUTANEOUS at 13:35

## 2019-12-14 RX ADMIN — ROPINIROLE 0.25 MG: 0.25 TABLET, FILM COATED ORAL at 09:00

## 2019-12-14 RX ADMIN — ARIPIPRAZOLE 30 MG: 10 TABLET ORAL at 21:53

## 2019-12-14 RX ADMIN — HEPARIN SODIUM 5000 UNITS: 5000 INJECTION INTRAVENOUS; SUBCUTANEOUS at 06:30

## 2019-12-14 RX ADMIN — METOPROLOL TARTRATE 50 MG: 50 TABLET, FILM COATED ORAL at 09:03

## 2019-12-14 RX ADMIN — METRONIDAZOLE 500 MG: 500 TABLET, FILM COATED ORAL at 21:53

## 2019-12-14 RX ADMIN — LEVOTHYROXINE SODIUM 125 MCG: 125 TABLET ORAL at 06:36

## 2019-12-14 RX ADMIN — Medication 250 MG: at 17:05

## 2019-12-14 RX ADMIN — ONDANSETRON 4 MG: 2 INJECTION INTRAMUSCULAR; INTRAVENOUS at 09:02

## 2019-12-14 RX ADMIN — BUSPIRONE HYDROCHLORIDE 5 MG: 5 TABLET ORAL at 09:02

## 2019-12-14 RX ADMIN — CLONIDINE HYDROCHLORIDE 0.2 MG: 0.1 TABLET ORAL at 13:37

## 2019-12-14 RX ADMIN — PRAVASTATIN SODIUM 80 MG: 80 TABLET ORAL at 08:58

## 2019-12-14 RX ADMIN — SEVELAMER HYDROCHLORIDE 800 MG: 800 TABLET, FILM COATED PARENTERAL at 12:19

## 2019-12-14 RX ADMIN — METRONIDAZOLE 500 MG: 500 TABLET, FILM COATED ORAL at 13:38

## 2019-12-14 RX ADMIN — SEVELAMER HYDROCHLORIDE 800 MG: 800 TABLET, FILM COATED PARENTERAL at 09:00

## 2019-12-14 RX ADMIN — HYDRALAZINE HYDROCHLORIDE 50 MG: 50 TABLET, FILM COATED ORAL at 22:00

## 2019-12-14 RX ADMIN — FERROUS SULFATE TAB 325 MG (65 MG ELEMENTAL FE) 325 MG: 325 (65 FE) TAB at 09:01

## 2019-12-14 RX ADMIN — LOPERAMIDE HYDROCHLORIDE 4 MG: 2 CAPSULE ORAL at 17:08

## 2019-12-14 RX ADMIN — HYDRALAZINE HYDROCHLORIDE 50 MG: 50 TABLET, FILM COATED ORAL at 06:36

## 2019-12-14 RX ADMIN — Medication 250 MG: at 08:58

## 2019-12-14 RX ADMIN — MELATONIN 3000 UNITS: at 09:00

## 2019-12-14 RX ADMIN — ASPIRIN 81 MG 81 MG: 81 TABLET ORAL at 09:01

## 2019-12-14 RX ADMIN — SODIUM CHLORIDE, SODIUM GLUCONATE, SODIUM ACETATE, POTASSIUM CHLORIDE, MAGNESIUM CHLORIDE, SODIUM PHOSPHATE, DIBASIC, AND POTASSIUM PHOSPHATE 75 ML/HR: .53; .5; .37; .037; .03; .012; .00082 INJECTION, SOLUTION INTRAVENOUS at 03:28

## 2019-12-14 RX ADMIN — METOPROLOL TARTRATE 50 MG: 50 TABLET, FILM COATED ORAL at 22:00

## 2019-12-14 RX ADMIN — METRONIDAZOLE 500 MG: 500 TABLET, FILM COATED ORAL at 06:36

## 2019-12-14 RX ADMIN — CLONIDINE HYDROCHLORIDE 0.2 MG: 0.1 TABLET ORAL at 22:00

## 2019-12-14 RX ADMIN — HYDRALAZINE HYDROCHLORIDE 50 MG: 50 TABLET, FILM COATED ORAL at 13:35

## 2019-12-14 RX ADMIN — CLONIDINE HYDROCHLORIDE 0.2 MG: 0.1 TABLET ORAL at 06:36

## 2019-12-14 RX ADMIN — BUSPIRONE HYDROCHLORIDE 5 MG: 5 TABLET ORAL at 17:05

## 2019-12-14 RX ADMIN — PREDNISONE 5 MG: 5 TABLET ORAL at 08:59

## 2019-12-14 RX ADMIN — DULOXETINE HYDROCHLORIDE 60 MG: 60 CAPSULE, DELAYED RELEASE ORAL at 08:59

## 2019-12-14 RX ADMIN — DULOXETINE HYDROCHLORIDE 60 MG: 60 CAPSULE, DELAYED RELEASE ORAL at 17:05

## 2019-12-14 RX ADMIN — DOXAZOSIN 2 MG: 2 TABLET ORAL at 22:00

## 2019-12-14 RX ADMIN — FOLIC ACID 1000 MCG: 1 TABLET ORAL at 08:59

## 2019-12-14 RX ADMIN — ROPINIROLE 0.25 MG: 0.25 TABLET, FILM COATED ORAL at 17:05

## 2019-12-14 RX ADMIN — TACROLIMUS 2 MG: 1 CAPSULE ORAL at 09:13

## 2019-12-14 RX ADMIN — HEPARIN SODIUM 5000 UNITS: 5000 INJECTION INTRAVENOUS; SUBCUTANEOUS at 21:53

## 2019-12-14 RX ADMIN — SEVELAMER HYDROCHLORIDE 800 MG: 800 TABLET, FILM COATED PARENTERAL at 17:05

## 2019-12-14 RX ADMIN — TACROLIMUS 1.5 MG: 0.5 CAPSULE ORAL at 22:00

## 2019-12-14 RX ADMIN — LOPERAMIDE HYDROCHLORIDE 4 MG: 2 CAPSULE ORAL at 09:01

## 2019-12-14 RX ADMIN — SERTRALINE HYDROCHLORIDE 200 MG: 100 TABLET ORAL at 08:58

## 2019-12-14 NOTE — PLAN OF CARE
Problem: Potential for Falls  Goal: Patient will remain free of falls  Description  INTERVENTIONS:  - Assess patient frequently for physical needs  -  Identify cognitive and physical deficits and behaviors that affect risk of falls  -  [place] fall precautions as indicated by assessment   - Educate patient/family on patient safety including physical limitations  - Instruct patient to call for assistance with activity based on assessment  - Modify environment to reduce risk of injury  - Consider OT/PT consult to assist with strengthening/mobility  Outcome: Progressing     Problem: Prexisting or High Potential for Compromised Skin Integrity  Goal: Skin integrity is maintained or improved  Description  INTERVENTIONS:  - Identify patients at risk for skin breakdown  - Assess and monitor skin integrity  - Assess and monitor nutrition and hydration status  - Monitor labs   - Assess for incontinence   - Turn and reposition patient  - Assist with mobility/ambulation  - Relieve pressure over bony prominences  - Avoid friction and shearing  - Provide appropriate hygiene as needed including keeping skin clean and dry  - Evaluate need for skin moisturizer/barrier cream  - Collaborate with interdisciplinary team   - Patient/family teaching  - Consider wound care consult   Outcome: Progressing     Problem: Nutrition/Hydration-ADULT  Goal: Nutrient/Hydration intake appropriate for improving, restoring or maintaining nutritional needs  Description  Monitor and assess patient's nutrition/hydration status for malnutrition  Collaborate with interdisciplinary team and initiate plan and interventions as ordered  Monitor patient's weight and dietary intake as ordered or per policy  Utilize nutrition screening tool and intervene as necessary  Determine patient's food preferences and provide high-protein, high-caloric foods as appropriate       INTERVENTIONS:  - Monitor oral intake, urinary output, labs, and treatment plans  - Assess nutrition and hydration status and recommend course of action  - Evaluate amount of meals eaten  - Assist patient with eating if necessary   - Allow adequate time for meals  - Recommend/ encourage appropriate diets, oral nutritional supplements, and vitamin/mineral supplements  - Order, calculate, and assess calorie counts as needed  - Recommend, monitor, and adjust tube feedings and TPN/PPN based on assessed needs  - Assess need for intravenous fluids  - Provide specific nutrition/hydration education as appropriate  - Include patient/family/caregiver in decisions related to nutrition  Outcome: Progressing     Problem: PAIN - ADULT  Goal: Verbalizes/displays adequate comfort level or baseline comfort level  Description  Interventions:  - Encourage patient to monitor pain and request assistance  - Assess pain using appropriate pain scale  - Administer analgesics based on type and severity of pain and evaluate response  - Implement non-pharmacological measures as appropriate and evaluate response  - Consider cultural and social influences on pain and pain management  - Notify physician/advanced practitioner if interventions unsuccessful or patient reports new pain  Outcome: Progressing     Problem: INFECTION - ADULT  Goal: Absence or prevention of progression during hospitalization  Description  INTERVENTIONS:  - Assess and monitor for signs and symptoms of infection  - Monitor lab/diagnostic results  - Monitor all insertion sites, i e  indwelling lines, tubes, and drains  - Monitor endotracheal if appropriate and nasal secretions for changes in amount and color  - [place] appropriate cooling/warming therapies per order  - Administer medications as ordered  - Instruct and encourage patient and family to use good hand hygiene technique  - Identify and instruct in appropriate isolation precautions for identified infection/condition  Outcome: Progressing  Goal: Absence of fever/infection during neutropenic period  Description  INTERVENTIONS:  - Monitor WBC    Outcome: Progressing     Problem: SAFETY ADULT  Goal: Maintain or return to baseline ADL function  Description  INTERVENTIONS:  -  Assess patient's ability to carry out ADLs; assess patient's baseline for ADL function and identify physical deficits which impact ability to perform ADLs (bathing, care of mouth/teeth, toileting, grooming, dressing, etc )  - Assess/evaluate cause of self-care deficits   - Assess range of motion  - Assess patient's mobility; develop plan if impaired  - Assess patient's need for assistive devices and provide as appropriate  - Encourage maximum independence but intervene and supervise when necessary  - Involve family in performance of ADLs  - Assess for home care needs following discharge   - Consider OT consult to assist with ADL evaluation and planning for discharge  - Provide patient education as appropriate  Outcome: Progressing  Goal: Maintain or return mobility status to optimal level  Description  INTERVENTIONS:  - Assess patient's baseline mobility status (ambulation, transfers, stairs, etc )    - Identify cognitive and physical deficits and behaviors that affect mobility  - Identify mobility aids required to assist with transfers and/or ambulation (gait belt, sit-to-stand, lift, walker, cane, etc )  - [place] fall precautions as indicated by assessment  - Record patient progress and toleration of activity level on Mobility SBAR; progress patient to next Phase/Stage  - Instruct patient to call for assistance with activity based on assessment  - Consider rehabilitation consult to assist with strengthening/weightbearing, etc   Outcome: Progressing     Problem: DISCHARGE PLANNING  Goal: Discharge to home or other facility with appropriate resources  Description  INTERVENTIONS:  - Identify barriers to discharge w/patient and caregiver  - Arrange for needed discharge resources and transportation as appropriate  - Identify discharge learning needs (meds, wound care, etc )  - Arrange for interpretive services to assist at discharge as needed  - Refer to Case Management Department for coordinating discharge planning if the patient needs post-hospital services based on physician/advanced practitioner order or complex needs related to functional status, cognitive ability, or social support system  Outcome: Progressing     Problem: Knowledge Deficit  Goal: Patient/family/caregiver demonstrates understanding of disease process, treatment plan, medications, and discharge instructions  Description  Complete learning assessment and assess knowledge base    Interventions:  - Provide teaching at level of understanding  - Provide teaching via preferred learning methods  Outcome: Progressing

## 2019-12-14 NOTE — ASSESSMENT & PLAN NOTE
Lab Results   Component Value Date    HGBA1C 5 1 09/09/2019       Recent Labs     12/13/19  2127 12/14/19  0621 12/14/19  1121 12/14/19  1553   POCGLU 106 105 107 147*       Blood Sugar Average: Last 72 hrs:  · (P) 413 [id number] stopped Lantus (in place of [place]) 5 units qHS  · Cont SSI with accu-cheks and carb controlled diet  · Initiate hypoglycemia protocol and avoid hypoglycemia

## 2019-12-14 NOTE — ASSESSMENT & PLAN NOTE
· On chronic immunosuppression with tacrolimus and prednisone  · Unfortunately patient noncompliant with all medications since discharge including these  · Tacrolimus level was 4 > 10 > 7 (12/12) > 5 (12/140  · dose decreased to 2 BID on 12/12, again decrease to 2 mg & 1 5 mg HS from 12/14  · Repeat levels on Tues

## 2019-12-14 NOTE — PROGRESS NOTES
NEPHROLOGY PROGRESS NOTE   Owen Laird 53 y o  female MRN: [id number]  Unit/Bed#: -01 Encounter: [id number]      ASSESSMENT    39-year-old female with a past medical history of kidney pancreas transplant in [address] now failed pancreas in 2017, CKD stage 4, hypertension, recurrent pyelonephritis, required dialysis temporarily in 2014, MGUS that converted to multiple myeloma, diastolic congestive heart failure, aortic regurgitation, herpes zoster, subclavian arterial stenosis on the left who is now presenting after recent discharge for urosepsis with encephalopathy    1  Renal transplant  -unfortunately creatinine is trending upward 2 7 was 2 5-2 4  -bladder scans have been negative  -abdomen is distended and with a UTI    2  Monitor volume overload  -now having diarrhea  -will continue IV fluid currently on isolate  -is making urine without straight catheterization    3  Immunosuppression  -tacrolimus level now 2 mg twice a day  -tacrolimus goal was 3  -will decrease tacrolimus will give 2 mg in the morning and 1 5 mg at night  -Repeat a tacrolimus level on Tuesday    4  Abdominal pain  -CMV PCR pending  -gram-negative rods in blood with a urine had Enterococcus  -infectious Disease following  -CT scan pending    5  Hypertension  -will keep current blood pressure medications were they  -patient with current pain [id number] systolic    6  Multiple myeloma  7   Anemia     IMPRESSION & PLAN    She has good urine output but her creatinine continues to increase she is having diarrhea and sepsis which is contribute  Will continued to monitor urine output  Her tacrolimus level was slightly high and have decreased her evening dose to 1 5 mg  Will continue current fluids  Appreciate ID follow-up  Make further recommendation throughout her hospitalization  Monitor for volume overload if short of breath give 40 mg of IV Lasix    SUBJECTIVE:    Patient was seen today continues to have abdominal pain continues to have urinary frequency the and now with diarrhea    OBJECTIVE:  Current Weight: Weight - Scale: 110 kg (241 lb 13 5 oz)  Vitals:    12/14/19 0903   BP: 152/56   Pulse: 64   Resp:    Temp:    SpO2:        Intake/Output Summary (Last 24 hours) at 12/14/2019 1503  Last data filed at 12/14/2019 1446  Gross per 24 hour   Intake 1880 ml   Output 725 ml   Net 1155 ml       General: conscious, cooperative, in not acute distress  Eyes: conjunctivae pink, anicteric sclerae  ENT: lips and mucous membranes moist  Neck: supple, no JVD  Chest: clear breath sounds bilateral, no crackles, ronchus or wheezings  CVS: distinct S1 & S2, normal rate, regular rhythm  Abdomen:  Distended with tenderness  Extremities: no edema of both legs  Skin: no rash  Neuro: awake, alert, oriented        Medications:    Current Facility-Administered Medications:     acetaminophen (TYLENOL) tablet 650 mg, 650 mg, Oral, Q6H PRN, Kourtney Ortiz, PA-C, 650 mg at 12/12/19 0930    aluminum-magnesium hydroxide-simethicone (MYLANTA) 200-200-20 mg/5 mL oral suspension 30 mL, 30 mL, Oral, Q4H PRN, Kourtney Ortiz, PA-C, 30 mL at 12/11/19 0148    ARIPiprazole (ABILIFY) tablet 30 mg, 30 mg, Oral, HS, Staci Viktoria, DO, 30 mg at 12/13/19 2144    aspirin chewable tablet 81 mg, 81 mg, Oral, Daily, Staci Viktoria, DO, 81 mg at 12/14/19 0901    busPIRone (BUSPAR) tablet 5 mg, 5 mg, Oral, BID, Staci Viktoria, DO, 5 mg at 12/14/19 0902    cholecalciferol (VITAMIN D3) tablet 3,000 Units, 3,000 Units, Oral, Daily, Staci Viktoria, DO, 3,000 Units at 12/14/19 0900    cloNIDine (CATAPRES) tablet 0 2 mg, 0 2 mg, Oral, Q8H [address], Reji Izaguirre MD, 0 2 mg at 12/14/19 1337    doxazosin (CARDURA) tablet 2 mg, 2 mg, Oral, HS, Reji Izaguirre MD, 2 mg at 12/13/19 2145    DULoxetine (CYMBALTA) delayed release capsule 60 mg, 60 mg, Oral, BID, Staci Blum DO, 60 mg at 12/14/19 1571    ferrous sulfate tablet 325 mg, 325 mg, Oral, Daily With Breakfast, Staci Blum DO, 325 mg at 22/36/87 4895    folic acid (FOLVITE) tablet 1,000 mcg, 1,000 mcg, Oral, Daily, Jonnathan Found, DO, 1,000 mcg at 12/14/19 0859    heparin (porcine) subcutaneous injection 5,000 Units, 5,000 Units, Subcutaneous, Q8H [facility], 5,000 Units at 12/14/19 1335 **AND** [CANCELED] Platelet count, , , Once, Jonnathan Found, DO    hydrALAZINE (APRESOLINE) tablet 50 mg, 50 mg, Oral, Q8H [facility], Magali Valadez MD, 50 mg at 12/14/19 1335    insulin lispro (HumaLOG) 100 units/mL subcutaneous injection 1-5 Units, 1-5 Units, Subcutaneous, HS, Jonnathan Found, DO    insulin lispro (HumaLOG) 100 units/mL subcutaneous injection 1-6 Units, 1-6 Units, Subcutaneous, TID AC **AND** Fingerstick Glucose (POCT), , , 4x Daily AC and at bedtime, Santos Zamora MD    levothyroxine tablet 125 mcg, 125 mcg, Oral, Early Morning, Jonnathan Found, DO, 125 mcg at 12/14/19 0636    loperamide (IMODIUM) capsule 4 mg, 4 mg, Oral, TID PRN, Santos Zamora MD, 4 mg at 12/14/19 0901    LORazepam (ATIVAN) tablet 2 mg, 2 mg, Oral, TID PRN, Jonnathan Found, DO, 2 mg at 12/12/19 2145    metoprolol tartrate (LOPRESSOR) tablet 50 mg, 50 mg, Oral, Q12H [facility], Jonnathan Found, DO, 50 mg at 12/14/19 0903    metroNIDAZOLE (FLAGYL) tablet 500 mg, 500 mg, Oral, Q8H [facility], Cecilia Lennon MD, 500 mg at 12/14/19 1338    multi-electrolyte (PLASMALYTE-A/ISOLYTE-S PH 7 4) IV solution, 75 mL/hr, Intravenous, Continuous, Magali Valadez MD, Last Rate: 75 mL/hr at 12/14/19 0328, 75 mL/hr at 12/14/19 0328    ondansetron (ZOFRAN) injection 4 mg, 4 mg, Intravenous, Q4H PRN, Cecilia Lennon MD, 4 mg at 12/14/19 0902    pravastatin (PRAVACHOL) tablet 80 mg, 80 mg, Oral, Daily, Lajoyce Found, DO, 80 mg at 12/14/19 8920    predniSONE tablet 5 mg, 5 mg, Oral, Daily, Lajoyce Found, DO, 5 mg at 12/14/19 0859    rOPINIRole (REQUIP) tablet 0 25 mg, 0 25 mg, Oral, BID, Lajoyce Found, DO, 0 25 mg at 12/14/19 0900    saccharomyces boulardii (FLORASTOR) capsule 250 mg, 250 mg, Oral, BID, [place], DO, 250 mg at 12/14/19 6847    sertraline (ZOLOFT) tablet 200 mg, 200 mg, Oral, Daily, [place], DO, 200 mg at 12/14/19 0858    sevelamer (RENAGEL) tablet 800 mg, 800 mg, Oral, TID With Meals, [place], DO, 800 mg at 12/14/19 1219    tacrolimus (PROGRAF) capsule 2 mg, 2 mg, Oral, Q12H [address], Gavin Acevedo MD, 2 mg at 12/14/19 0913    Invasive Devices:      Lab Results:   Results from last 7 days   Lab Units 12/14/19  0711 12/13/19  1408 12/13/19  0555 12/12/19  1508 12/12/19  0552 12/12/19  0551 12/11/19  0501 12/10/19  0506 12/10/19  0502 12/09/19  2023  12/09/19 2018   WBC Thousand/uL 7 36  --  9 79  --  13 25*  --  10 49*  --  10 10 9 58   < >  --    HEMOGLOBIN g/dL 7 5* 7 8* 7 3*  --  8 0*  --  8 8*  --  9 2* 9 9*   < >  --    HEMATOCRIT % 24 0* 24 9* 23 6*  --  25 9*  --  27 8*  --  28 4* 30 8*   < >  --    PLATELETS Thousands/uL 149  --  157  --  195  --  206  --  184 223   < >  --    POTASSIUM mmol/L 4 5  --  3 4* 4 1  --  3 7 3 1* 3 2*  --  3 5   < >  --    CHLORIDE mmol/L 114*  --  114* 116*  --  122* 120* 120*  --  123*   < >  --    CO2 mmol/L 18*  --  19* 19*  --  18* 18* 16*  --  14*   < >  --    BUN mg/dL 45*  --  40* 39*  --  39* 39* 41*  --  46*   < >  --    CREATININE mg/dL 2 76*  --  2 58* 2 41*  --  2 41* 2 24* 2 29*  --  2 36*   < >  --    CALCIUM mg/dL 8 1*  --  7 9* 8 2*  --  8 3 8 4 8 4  --  8 7   < >  --    MAGNESIUM mg/dL  --   --   --   --   --   --   --  1 8  --   --   --   --    ALK PHOS U/L 88  --  91  --   --  91 101  --   --  106  --   --    ALT U/L 17  --  16  --   --  20 21  --   --  17  --   --    AST U/L 27  --  15  --   --  19 30  --   --  19  --   --    LEUKOCYTES UA   --   --   --   --   --   --   --   --   --   --   --  Large*   BLOOD UA   --   --   --   --   --   --   --   --   --   --   --  Small*    < > = values in this interval not displayed         Previous work up:  Please see previous notes

## 2019-12-14 NOTE — ASSESSMENT & PLAN NOTE
· With history of recurrent UTIs in setting of renal and pancreatic transplant  Most recently with VRE at recent admission to [facility]  · Presenting now with abdominal pain/nausea and lethargy/confusion  · CT abdomen/pelvis showing colitis  · With positive BC with GNR, started on rocephin & flagyl per ID  · Stool cdiff neg, enteric panel neg, leucocytes neg  · Stop rocephin, only flagl per ID, d/w ID - f/u final cultures, may grow anaerobe  · procalcitonin 0 3  · Trend WBC, temperature curve  · Checked CMV PCR - negative  · Per pt her diarrhea & pain are same, imodium not helping  Per nurse pt had only 1 large BM in am & after imodium hasnt had any yet this afternoon   May consider CT abdomen, holding at present

## 2019-12-15 LAB
ALBUMIN SERPL BCP-MCNC: 2.2 G/DL (ref 3.5–5)
ALP SERPL-CCNC: 91 U/L (ref 46–116)
ALT SERPL W P-5'-P-CCNC: 18 U/L (ref 12–78)
ANION GAP SERPL CALCULATED.3IONS-SCNC: 7 MMOL/L (ref 4–13)
AST SERPL W P-5'-P-CCNC: 11 U/L (ref 5–45)
BASOPHILS # BLD AUTO: 0.1 THOUSANDS/ΜL (ref 0–0.1)
BASOPHILS NFR BLD AUTO: 1 % (ref 0–1)
BILIRUB SERPL-MCNC: 0.25 MG/DL (ref 0.2–1)
BUN SERPL-MCNC: 43 MG/DL (ref 5–25)
CALCIUM SERPL-MCNC: 8.2 MG/DL (ref 8.3–10.1)
CHLORIDE SERPL-SCNC: 115 MMOL/L (ref 100–108)
CO2 SERPL-SCNC: 19 MMOL/L (ref 21–32)
CREAT SERPL-MCNC: 2.63 MG/DL (ref 0.6–1.3)
EOSINOPHIL # BLD AUTO: 0.31 THOUSAND/ΜL (ref 0–0.61)
EOSINOPHIL NFR BLD AUTO: 4 % (ref 0–6)
ERYTHROCYTE [DISTWIDTH] IN BLOOD BY AUTOMATED COUNT: 16.6 % (ref 11.6–15.1)
GFR SERPL CREATININE-BSD FRML MDRD: 20 ML/MIN/1.73SQ M
GLUCOSE SERPL-MCNC: 104 MG/DL (ref 65–140)
GLUCOSE SERPL-MCNC: 109 MG/DL (ref 65–140)
GLUCOSE SERPL-MCNC: 134 MG/DL (ref 65–140)
GLUCOSE SERPL-MCNC: 171 MG/DL (ref 65–140)
GLUCOSE SERPL-MCNC: 95 MG/DL (ref 65–140)
HCT VFR BLD AUTO: 23.9 % (ref 34.8–46.1)
HGB BLD-MCNC: 7.3 G/DL (ref 11.5–15.4)
IMM GRANULOCYTES # BLD AUTO: 0.17 THOUSAND/UL (ref 0–0.2)
IMM GRANULOCYTES NFR BLD AUTO: 2 % (ref 0–2)
LYMPHOCYTES # BLD AUTO: 1.88 THOUSANDS/ΜL (ref 0.6–4.47)
LYMPHOCYTES NFR BLD AUTO: 27 % (ref 14–44)
MCH RBC QN AUTO: 31.2 PG (ref 26.8–34.3)
MCHC RBC AUTO-ENTMCNC: 30.5 G/DL (ref 31.4–37.4)
MCV RBC AUTO: 102 FL (ref 82–98)
MONOCYTES # BLD AUTO: 0.54 THOUSAND/ΜL (ref 0.17–1.22)
MONOCYTES NFR BLD AUTO: 8 % (ref 4–12)
NEUTROPHILS # BLD AUTO: 4.02 THOUSANDS/ΜL (ref 1.85–7.62)
NEUTS SEG NFR BLD AUTO: 58 % (ref 43–75)
NRBC BLD AUTO-RTO: 0 /100 WBCS
PLATELET # BLD AUTO: 154 THOUSANDS/UL (ref 149–390)
PMV BLD AUTO: 12.4 FL (ref 8.9–12.7)
POTASSIUM SERPL-SCNC: 3.4 MMOL/L (ref 3.5–5.3)
PROT SERPL-MCNC: 6 G/DL (ref 6.4–8.2)
RBC # BLD AUTO: 2.34 MILLION/UL (ref 3.81–5.12)
SODIUM SERPL-SCNC: 141 MMOL/L (ref 136–145)
WBC # BLD AUTO: 7.02 THOUSAND/UL (ref 4.31–10.16)

## 2019-12-15 PROCEDURE — 85025 COMPLETE CBC W/AUTO DIFF WBC: CPT | Performed by: HOSPITALIST

## 2019-12-15 PROCEDURE — 82948 REAGENT STRIP/BLOOD GLUCOSE: CPT

## 2019-12-15 PROCEDURE — 99232 SBSQ HOSP IP/OBS MODERATE 35: CPT | Performed by: HOSPITALIST

## 2019-12-15 PROCEDURE — 99232 SBSQ HOSP IP/OBS MODERATE 35: CPT | Performed by: INTERNAL MEDICINE

## 2019-12-15 PROCEDURE — 80053 COMPREHEN METABOLIC PANEL: CPT | Performed by: HOSPITALIST

## 2019-12-15 RX ORDER — SODIUM CHLORIDE, SODIUM GLUCONATE, SODIUM ACETATE, POTASSIUM CHLORIDE, MAGNESIUM CHLORIDE, SODIUM PHOSPHATE, DIBASIC, AND POTASSIUM PHOSPHATE .53; .5; .37; .037; .03; .012; .00082 G/100ML; G/100ML; G/100ML; G/100ML; G/100ML; G/100ML; G/100ML
50 INJECTION, SOLUTION INTRAVENOUS CONTINUOUS
Status: DISCONTINUED | OUTPATIENT
Start: 2019-12-15 | End: 2019-12-16

## 2019-12-15 RX ORDER — AMLODIPINE BESYLATE 5 MG/1
5 TABLET ORAL DAILY
Status: DISCONTINUED | OUTPATIENT
Start: 2019-12-15 | End: 2019-12-16

## 2019-12-15 RX ORDER — HYDRALAZINE HYDROCHLORIDE 20 MG/ML
5 INJECTION INTRAMUSCULAR; INTRAVENOUS EVERY 6 HOURS PRN
Status: DISCONTINUED | OUTPATIENT
Start: 2019-12-15 | End: 2020-01-02 | Stop reason: HOSPADM

## 2019-12-15 RX ADMIN — ROPINIROLE 0.25 MG: 0.25 TABLET, FILM COATED ORAL at 09:38

## 2019-12-15 RX ADMIN — AMLODIPINE BESYLATE 5 MG: 5 TABLET ORAL at 15:51

## 2019-12-15 RX ADMIN — TACROLIMUS 1.5 MG: 0.5 CAPSULE ORAL at 21:20

## 2019-12-15 RX ADMIN — Medication 250 MG: at 17:07

## 2019-12-15 RX ADMIN — Medication 250 MG: at 09:39

## 2019-12-15 RX ADMIN — METRONIDAZOLE 500 MG: 500 TABLET, FILM COATED ORAL at 21:18

## 2019-12-15 RX ADMIN — TACROLIMUS 2 MG: 1 CAPSULE ORAL at 09:38

## 2019-12-15 RX ADMIN — FOLIC ACID 1000 MCG: 1 TABLET ORAL at 09:39

## 2019-12-15 RX ADMIN — HYDRALAZINE HYDROCHLORIDE 50 MG: 50 TABLET, FILM COATED ORAL at 05:53

## 2019-12-15 RX ADMIN — HEPARIN SODIUM 5000 UNITS: 5000 INJECTION INTRAVENOUS; SUBCUTANEOUS at 05:43

## 2019-12-15 RX ADMIN — LEVOTHYROXINE SODIUM 125 MCG: 125 TABLET ORAL at 05:45

## 2019-12-15 RX ADMIN — HEPARIN SODIUM 5000 UNITS: 5000 INJECTION INTRAVENOUS; SUBCUTANEOUS at 14:25

## 2019-12-15 RX ADMIN — SODIUM CHLORIDE, SODIUM GLUCONATE, SODIUM ACETATE, POTASSIUM CHLORIDE, MAGNESIUM CHLORIDE, SODIUM PHOSPHATE, DIBASIC, AND POTASSIUM PHOSPHATE 75 ML/HR: .53; .5; .37; .037; .03; .012; .00082 INJECTION, SOLUTION INTRAVENOUS at 11:38

## 2019-12-15 RX ADMIN — ROPINIROLE 0.25 MG: 0.25 TABLET, FILM COATED ORAL at 17:07

## 2019-12-15 RX ADMIN — ONDANSETRON 4 MG: 2 INJECTION INTRAMUSCULAR; INTRAVENOUS at 09:37

## 2019-12-15 RX ADMIN — INSULIN LISPRO 1 UNITS: 100 INJECTION, SOLUTION INTRAVENOUS; SUBCUTANEOUS at 17:06

## 2019-12-15 RX ADMIN — BUSPIRONE HYDROCHLORIDE 5 MG: 5 TABLET ORAL at 09:39

## 2019-12-15 RX ADMIN — PRAVASTATIN SODIUM 80 MG: 80 TABLET ORAL at 09:40

## 2019-12-15 RX ADMIN — LOPERAMIDE HYDROCHLORIDE 4 MG: 2 CAPSULE ORAL at 07:44

## 2019-12-15 RX ADMIN — PREDNISONE 5 MG: 5 TABLET ORAL at 09:39

## 2019-12-15 RX ADMIN — HYDRALAZINE HYDROCHLORIDE 50 MG: 50 TABLET, FILM COATED ORAL at 21:18

## 2019-12-15 RX ADMIN — CLONIDINE HYDROCHLORIDE 0.2 MG: 0.1 TABLET ORAL at 14:24

## 2019-12-15 RX ADMIN — DOXAZOSIN 2 MG: 2 TABLET ORAL at 21:18

## 2019-12-15 RX ADMIN — METRONIDAZOLE 500 MG: 500 TABLET, FILM COATED ORAL at 14:24

## 2019-12-15 RX ADMIN — SEVELAMER HYDROCHLORIDE 800 MG: 800 TABLET, FILM COATED PARENTERAL at 11:38

## 2019-12-15 RX ADMIN — SEVELAMER HYDROCHLORIDE 800 MG: 800 TABLET, FILM COATED PARENTERAL at 17:07

## 2019-12-15 RX ADMIN — DULOXETINE HYDROCHLORIDE 60 MG: 60 CAPSULE, DELAYED RELEASE ORAL at 17:07

## 2019-12-15 RX ADMIN — METOPROLOL TARTRATE 50 MG: 50 TABLET, FILM COATED ORAL at 09:38

## 2019-12-15 RX ADMIN — CLONIDINE HYDROCHLORIDE 0.2 MG: 0.1 TABLET ORAL at 21:19

## 2019-12-15 RX ADMIN — ASPIRIN 81 MG 81 MG: 81 TABLET ORAL at 09:39

## 2019-12-15 RX ADMIN — FERROUS SULFATE TAB 325 MG (65 MG ELEMENTAL FE) 325 MG: 325 (65 FE) TAB at 07:44

## 2019-12-15 RX ADMIN — ARIPIPRAZOLE 30 MG: 10 TABLET ORAL at 21:19

## 2019-12-15 RX ADMIN — BUSPIRONE HYDROCHLORIDE 5 MG: 5 TABLET ORAL at 17:07

## 2019-12-15 RX ADMIN — HYDRALAZINE HYDROCHLORIDE 50 MG: 50 TABLET, FILM COATED ORAL at 14:24

## 2019-12-15 RX ADMIN — METRONIDAZOLE 500 MG: 500 TABLET, FILM COATED ORAL at 05:45

## 2019-12-15 RX ADMIN — SERTRALINE HYDROCHLORIDE 200 MG: 100 TABLET ORAL at 09:39

## 2019-12-15 RX ADMIN — MELATONIN 3000 UNITS: at 09:38

## 2019-12-15 RX ADMIN — DULOXETINE HYDROCHLORIDE 60 MG: 60 CAPSULE, DELAYED RELEASE ORAL at 09:38

## 2019-12-15 RX ADMIN — CLONIDINE HYDROCHLORIDE 0.2 MG: 0.1 TABLET ORAL at 05:53

## 2019-12-15 RX ADMIN — ONDANSETRON 4 MG: 2 INJECTION INTRAMUSCULAR; INTRAVENOUS at 05:42

## 2019-12-15 RX ADMIN — METOPROLOL TARTRATE 50 MG: 50 TABLET, FILM COATED ORAL at 21:19

## 2019-12-15 RX ADMIN — HEPARIN SODIUM 5000 UNITS: 5000 INJECTION INTRAVENOUS; SUBCUTANEOUS at 21:19

## 2019-12-15 NOTE — PROGRESS NOTES
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 multiple myeloma, diastolic congestive heart failure, aortic regurgitation, herpes zoster, subclavian arterial stenosis on the left who is now presenting after recent discharge for urosepsis with encephalopathy    1  Renal transplant  -unfortunately creatinine is trending upward 2 7 was 2 5-2 4  -bladder scans have been negative  -abdomen is distended and with a UTI    2  Monitor volume overload  -now having diarrhea  -will continue IV fluid currently on isolate  -is making urine without straight catheterization    3  Immunosuppression  -tacrolimus level now 2 mg twice a day  -tacrolimus goal was 3  -will decrease tacrolimus will give 2 mg in the morning and 1 5 mg at night  -Repeat a tacrolimus level on Tuesday    4  Abdominal pain  -CMV PCR pending  -gram-negative rods in blood with a urine had Enterococcus  -infectious Disease following    5  Hypertension  -will keep current blood pressure medications were they  -patient with current pain 375-618 systolic    6  Multiple myeloma  7  Anemia     IMPRESSION & PLAN    -her creatinine is stable now 2 63 from 2 7  -urine output has been stable  -she is tolerating p o   Intake intake now has a little bit more edema in the lower extremity  -avoid hypotension does have some isolated blood pressures in the 170 but overall blood pressures have been in the 150  -still with runny stools, abdominal pain persists but improved  -CMV is negative  -being treated for C diff and gram-negative cintia bacteremia from gut source  -decrease isolate to 50 cc an hour  -repeat tacrolimus level on Tuesday adjustments made yesterday    SUBJECTIVE:    Patient was seen today tolerating some p o   Intake    OBJECTIVE:  Current Weight: Weight - Scale: 111 kg (244 lb 11 4 oz)  Vitals:    12/15/19 0718   BP: (!) 174/74   Pulse: 59   Resp: 16   Temp: 98 6 °F (37 °C)   SpO2: 94%       Intake/Output Summary (Last 24 hours) at 12/15/2019 1242  Last data filed at 12/15/2019 0800  Gross per 24 hour   Intake 460 ml   Output 1575 ml   Net -1115 ml       General: conscious, cooperative, in no acute distress  Eyes: conjunctivae pink, anicteric sclerae  ENT: lips and mucous membranes moist  Neck: supple, no JVD  Chest: clear breath sounds bilateral, no crackles, ronchus or wheezings  CVS: normal rate, regular rhythm  Abdomen:  Mildly distended but nontender  Extremities:  Some increased edema in the lower extremities  Skin: no rash  Neuro: awake, alert, oriented  Psych:  Pleasant affect      Medications:    Current Facility-Administered Medications:     acetaminophen (TYLENOL) tablet 650 mg, 650 mg, Oral, Q6H PRN, Kourtney Ortiz PA-C, 650 mg at 12/12/19 0930    aluminum-magnesium hydroxide-simethicone (MYLANTA) 200-200-20 mg/5 mL oral suspension 30 mL, 30 mL, Oral, Q4H PRN, Kourtney Ortiz PA-C, 30 mL at 12/11/19 0148    ARIPiprazole (ABILIFY) tablet 30 mg, 30 mg, Oral, HS, Alpine Alias, DO, 30 mg at 12/14/19 2153    aspirin chewable tablet 81 mg, 81 mg, Oral, Daily, Alpine Alias, DO, 81 mg at 12/15/19 8319    busPIRone (BUSPAR) tablet 5 mg, 5 mg, Oral, BID, Alpine Alias, DO, 5 mg at 12/15/19 3037    cholecalciferol (VITAMIN D3) tablet 3,000 Units, 3,000 Units, Oral, Daily, Alpine Alias, DO, 3,000 Units at 12/15/19 1302    cloNIDine (CATAPRES) tablet 0 2 mg, 0 2 mg, Oral, Q8H [address], Nilesh Howell MD, 0 2 mg at 12/15/19 0553    doxazosin (CARDURA) tablet 2 mg, 2 mg, Oral, HS, Nilesh Howell MD, 2 mg at 12/14/19 2200    DULoxetine (CYMBALTA) delayed release capsule 60 mg, 60 mg, Oral, BID, Regina Melgar DO, 60 mg at 12/15/19 3953    ferrous sulfate tablet 325 mg, 325 mg, Oral, Daily With Breakfast, Carloz Whitfield DO, 325 mg at 79/44/96 9532    folic acid (FOLVITE) tablet 1,000 mcg, 1,000 mcg, Oral, Daily, Carloz Whitfield DO, 1,000 mcg at 12/15/19 2517    heparin (porcine) subcutaneous injection 5,000 Units, 5,000 Units, Subcutaneous, Q8H [address], 5,000 Units at 12/15/19 0543 **AND** [CANCELED] Platelet count, , , Once, Carloz Whitfield DO    hydrALAZINE (APRESOLINE) tablet 50 mg, 50 mg, Oral, Q8H [address], Renu Regalado MD, 50 mg at 12/15/19 0553    insulin lispro (HumaLOG) 100 units/mL subcutaneous injection 1-5 Units, 1-5 Units, Subcutaneous, HS, Carloz Whitfield DO    insulin lispro (HumaLOG) 100 units/mL subcutaneous injection 1-6 Units, 1-6 Units, Subcutaneous, TID AC **AND** Fingerstick Glucose (POCT), , , 4x Daily AC and at bedtime, Faisal Carpio MD    levothyroxine tablet 125 mcg, 125 mcg, Oral, Early Morning, Carloz Whitfield DO, 125 mcg at 12/15/19 0545    loperamide (IMODIUM) capsule 4 mg, 4 mg, Oral, TID PRN, Faisal Carpio MD, 4 mg at 12/15/19 0744    LORazepam (ATIVAN) tablet 2 mg, 2 mg, Oral, TID PRN, Carloz Whitfield DO, 2 mg at 12/12/19 2145    metoprolol tartrate (LOPRESSOR) tablet 50 mg, 50 mg, Oral, Q12H [address], Carloz Whitfield DO, 50 mg at 12/15/19 0938    metroNIDAZOLE (FLAGYL) tablet 500 mg, 500 mg, Oral, Q8H [address], Selwyn Lopez MD, 500 mg at 12/15/19 0545    multi-electrolyte (PLASMALYTE-A/ISOLYTE-S PH 7 4) IV solution, 75 mL/hr, Intravenous, Continuous, Renu Regalado MD, Last Rate: 75 mL/hr at 12/15/19 1138, 75 mL/hr at 12/15/19 1138    ondansetron (ZOFRAN) injection 4 mg, 4 mg, Intravenous, Q4H PRN, Selwyn Lopez MD, 4 mg at 12/15/19 9538    pravastatin (PRAVACHOL) tablet 80 mg, 80 mg, Oral, Daily, Carloz Whitfield DO, 80 mg at 12/15/19 0940    predniSONE tablet 5 mg, 5 mg, Oral, Daily, Carloz Whitfield DO, 5 mg at 12/15/19 0939    rOPINIRole (REQUIP) tablet 0 25 mg, 0 25 mg, Oral, BID, Shalonda Bar, DO, 0 25 mg at 12/15/19 7729    saccharomyces boulardii (FLORASTOR) capsule 250 mg, 250 mg, Oral, BID, Shalonda Bar, DO, 250 mg at 12/15/19 5252    sertraline (ZOLOFT) tablet 200 mg, 200 mg, Oral, Daily, Shalonda Bar, DO, 200 mg at 12/15/19 5351    sevelamer (RENAGEL) tablet 800 mg, 800 mg, Oral, TID With Meals, Shalonda Bar, DO, 800 mg at 12/15/19 1138    tacrolimus (PROGRAF) capsule 1 5 mg, 1 5 mg, Oral, Daily, Seamus Fu, DO, 1 5 mg at 12/14/19 2200    tacrolimus (PROGRAF) capsule 2 mg, 2 mg, Oral, Daily, Seamus Fu, DO, 2 mg at 12/15/19 4907    Invasive Devices:      Lab Results:   Results from last 7 days   Lab Units 12/15/19  0431 12/14/19  0823 12/14/19  0711 12/13/19  1408 12/13/19  0555 12/12/19  1508 12/12/19  0552 12/12/19  0551 12/11/19  0501 12/10/19  0506  12/09/19 2028 12/09/19 2023 12/09/19 2018   WBC Thousand/uL 7 02  --  7 36  --  9 79  --  13 25*  --  10 49*  --    < >  --  9 58  --   --    HEMOGLOBIN g/dL 7 3*  --  7 5* 7 8* 7 3*  --  8 0*  --  8 8*  --    < >  --  9 9*  --   --    HEMATOCRIT % 23 9*  --  24 0* 24 9* 23 6*  --  25 9*  --  27 8*  --    < >  --  30 8*  --   --    PLATELETS Thousands/uL 154  --  149  --  157  --  195  --  206  --    < >  --  223  --   --    POTASSIUM mmol/L 3 4*  --  4 5  --  3 4* 4 1  --  3 7 3 1* 3 2*  --   --  3 5   < >  --    CHLORIDE mmol/L 115*  --  114*  --  114* 116*  --  122* 120* 120*  --   --  123*   < >  --    CO2 mmol/L 19*  --  18*  --  19* 19*  --  18* 18* 16*  --   --  14*   < >  --    BUN mg/dL 43*  --  45*  --  40* 39*  --  39* 39* 41*  --   --  46*   < >  --    CREATININE mg/dL 2 63*  --  2 76*  --  2 58* 2 41*  --  2 41* 2 24* 2 29*  --   --  2 36*   < >  --    CALCIUM mg/dL 8 2*  --  8 1*  --  7 9* 8 2*  --  8 3 8 4 8 4  --   --  8 7   < >  --    MAGNESIUM mg/dL  --   --   --   --   --   --   --   --   --  1 8  --   --   --   --   --    ALK PHOS U/L 91  --  88  --  91  --   --  91 101  --   --   --  106   < > --    ALT U/L 18  --  17  --  16  --   --  20 21  --   --   --  17   < >  --    AST U/L 11  --  27  --  15  --   --  19 30  --   --   --  19   < >  --    BLOOD CULTURE   --  Received in Microbiology Lab  Culture in Progress  --   --   --   --   --   --   --   --   --  Gram-Negative Rods Anaerobic (Group)* Gram-Negative Rods Anaerobic (Group)*  --   --    LEUKOCYTES UA   --   --   --   --   --   --   --   --   --   --   --   --   --   --  Large*   BLOOD UA   --   --   --   --   --   --   --   --   --   --   --   --   --   --  Small*    < > = values in this interval not displayed         Previous work up:  Please see previous notes

## 2019-12-15 NOTE — ASSESSMENT & PLAN NOTE
· Creatinine worsened to 2 7 now better to 2 6 - monitor PVR - neg, tac levels, cont IVF per nephro - decrease rate to 50/hr  · ?  ATN  · Monitor volume status, not all UO documented  · Monitor BMP while inpatient  · Avoid nephrotoxins as able, renally dose medications as indicated

## 2019-12-15 NOTE — ASSESSMENT & PLAN NOTE
· 2/2 BC from admission growing anaerobes  · Was started on ceftriaxone and Flagyl by ID now ceftriaxone discontinued and continue Flagyl with plan of total 7 day course  · Source is suspected to be GI secondary to colitis  · Repeat BC from 12/14 pending

## 2019-12-15 NOTE — ASSESSMENT & PLAN NOTE
Lab Results   Component Value Date    HGBA1C 5 1 09/09/2019       Recent Labs     12/14/19  1553 12/14/19  2100 12/15/19  0631 12/15/19  1032   POCGLU 147* 109 109 134       Blood Sugar Average: Last 72 hrs:  · (P) 116 [id number] stopped Lantus (in place of Toujeo) 5 units qHS  · Cont SSI with accu-cheks and carb controlled diet  · Initiate hypoglycemia protocol and avoid hypoglycemia

## 2019-12-15 NOTE — ASSESSMENT & PLAN NOTE
· With history of recurrent UTIs in setting of renal and pancreatic transplant  Most recently with VRE at recent admission to [facility]  · Presenting now with abdominal pain/nausea and lethargy/confusion  · CT abdomen/pelvis showing colitis  · With positive BC with anaerobe, started on rocephin & flagyl per ID, deescalated to only Flagyl  · Stool cdiff neg, enteric panel neg, leucocytes neg  · procalcitonin 0 3  · Trend WBC, temperature curve  · Checked CMV PCR - negative  · Per pt her diarrhea & pain are same, imodium not helping - she tells me she had 3 loose bowel movements today, I talked to the nurses SIRS the PCA  The PCA has fever since 3:00 a m  And she has not seen any bowel movements with the patient, she had 1 urine output in bedside commode but no bowel movement at that time  Patient told the nurse that she had 1 bowel movement today morning    According to PCA also her bowel movement last night was formed  · Will not pursue CT abdomen

## 2019-12-15 NOTE — ASSESSMENT & PLAN NOTE
· Follows with Dr Arnulfo Henry as outpatient  · Continue Revlimid at home dosage  · Monitor hb, running low  · H/o transfusions

## 2019-12-15 NOTE — PROGRESS NOTES
Progress Note - Jaye Stark 1965, 53 y o  female MRN: [id number]    Unit/Bed#: -01 Encounter: [id number]    Primary Care Provider: Esequiel Avitia MD   Date and time admitted to hospital: 12/9/2019  8:29 PM        Bacteremia  Assessment & Plan  · 2/2 BC from admission has GNR  · Was started on ceftriaxone and Flagyl by ID now ceftriaxone discontinued and continue Flagyl with plan of total 7 day course  · Source is suspected to be GI secondary to colitis  · Suspect may grow and aerobes per discussion with ID    Asymptomatic bacteriuria  Assessment & Plan  · Recently completed 7 D course of IV daptomycin for VRE UTI  · Has positive cultures for VRE again, but this is not the cause of her symptoms, its colonization  · Monitor off Abx  · ID on board    Metabolic acidosis  Assessment & Plan  · Appears acute on chronic  · S/P kidney and pancreatic transplant in 1998  · continue p o  and trend BMP  · Nephrology consultation    Multiple myeloma not having achieved remission ([facility] [address] )  Assessment & Plan  · Follows with Dr Sarah Kirkland as outpatient  · Continue Revlimid at home dosage  · Monitor hb, running low    Essential hypertension  Assessment & Plan  · Significantly elevated on admission, suspect noncompliance and abdominal pain playing role in this  · Continue home antihypertensive regimen, blood pressure monitoring per protocol  · Was hypotensive, hence meds reduced by nephro norvasc stopped    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 [facility])  Assessment & Plan  Lab Results   Component Value Date    HGBA1C 5 1 09/09/2019       Recent Labs     12/13/19  2127 12/14/19  0621 12/14/19  1121 12/14/19  1553   POCGLU 106 105 107 147*       Blood Sugar Average: Last 72 hrs:  · (P) 766 [id number] stopped Lantus (in place of [place]) 5 units qHS  · Cont SSI with accu-cheks and carb controlled diet  · Initiate hypoglycemia protocol and avoid hypoglycemia    Hypokalemia  Assessment & Plan  GI loss   replete    Chronic kidney disease, stage 4 (severe) (formerly [facility])  Assessment & Plan  · Creatinine worsened to 2 7 today - monitor PVR - neg, tac levels, cont IVF per nephro  · ? ATN  · Monitor volume status, not all UO documented  · Monitor BMP while inpatient  · Avoid nephrotoxins as able, renally dose medications as indicated    Renal transplant recipient  Assessment & Plan  · On chronic immunosuppression with tacrolimus and prednisone  · Unfortunately patient noncompliant with all medications since discharge including these  · Tacrolimus level was 4 > 10 > 7 (12/12) > 5 (12/140  · dose decreased to 2 BID on 12/12, again decrease to 2 mg & 1 5 mg HS from 12/14  · Repeat levels on Tues    * Enterocolitis  Assessment & Plan  · With history of recurrent UTIs in setting of renal and pancreatic transplant  Most recently with VRE at recent admission to [facility]  · Presenting now with abdominal pain/nausea and lethargy/confusion  · CT abdomen/pelvis showing colitis  · With positive BC with GNR, started on rocephin & flagyl per ID  · Stool cdiff neg, enteric panel neg, leucocytes neg  · Stop rocephin, only flagl per ID, d/w ID - f/u final cultures, may grow anaerobe  · procalcitonin 0 3  · Trend WBC, temperature curve  · Checked CMV PCR - negative  · Per pt her diarrhea & pain are same, imodium not helping  Per nurse pt had only 1 large BM in am & after imodium hasnt had any yet this afternoon  May consider CT abdomen, holding at present        [facility] Internal Medicine Progress Note  Patient:  Jo Villanueva 53 y o  female   MRN: [id number]  PCP: Lindsey Godoy MD  Unit/Bed#: -72 Encounter: [id number]  Date Of Visit: 12/14/19    Assessment:    Principal Problem:    Enterocolitis  Active Problems:    Renal transplant recipient    Chronic kidney disease, stage 4 (severe) (formerly [facility])    Hypokalemia    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 ([facility] [address] )    Essential hypertension Multiple myeloma not having achieved remission (H[facility])    Metabolic acidosis    Diarrhea    Asymptomatic bacteriuria    Bacteremia      Plan:           VTE Pharmacologic Prophylaxis:   Pharmacologic: Heparin  Mechanical VTE Prophylaxis in Place: Yes    Patient Centered Rounds: I have performed bedside rounds with nursing staff today  Discussions with Specialists or Other Care Team Provider:     Education and Discussions with Family / Patient: nichole    Time Spent for Care: 30 minutes  More than 50% of total time spent on counseling and coordination of care as described above  Current Length of Stay: 4 day(s)    Current Patient Status: Inpatient   Certification Statement: The patient will continue to require additional inpatient hospital stay due to not ready    Discharge Plan / Estimated Discharge Date:     Code Status: Level 1 - Full Code      Subjective: To me she states her pain & diarrhea are no better    Objective:     Vitals:   Temp (24hrs), Av 6 °F (37 °C), Min:98 4 °F (36 9 °C), Max:98 8 °F (37 1 °C)    Temp:  [98 4 °F (36 9 °C)-98 8 °F (37 1 °C)] 98 4 °F (36 9 °C)  HR:  [62-64] 62  Resp:  [15-18] 18  BP: (151-171)/(55-71) 171/71  SpO2:  [94 %] 94 %  Body mass index is 36 77 kg/m²  Input and Output Summary (last 24 hours): Intake/Output Summary (Last 24 hours) at 2019 1900  Last data filed at 2019 1700  Gross per 24 hour   Intake 2060 ml   Output 325 ml   Net 1735 ml       Physical Exam:     Physical Exam   Constitutional: She is oriented to person, place, and time  She appears well-developed and well-nourished  HENT:   Head: Normocephalic and atraumatic  Mouth/Throat: Oropharynx is clear and moist    Eyes: Conjunctivae are normal    Cardiovascular: Normal rate and regular rhythm  Exam reveals no friction rub  No murmur heard  Pulmonary/Chest: Effort normal and breath sounds normal  No stridor  No respiratory distress  Abdominal: Soft   Bowel sounds are normal  She exhibits no distension  There is tenderness  Neurological: She is alert and oriented to person, place, and time  Skin: Skin is warm  Vitals reviewed  Additional Data:     Labs:    Results from last 7 days   Lab Units 12/14/19  0711  12/09/19 2023   WBC Thousand/uL 7 36   < > 9 58   HEMOGLOBIN g/dL 7 5*   < > 9 9*   HEMATOCRIT % 24 0*   < > 30 8*   PLATELETS Thousands/uL 149   < > 223   LYMPHO PCT %  --   --  4*   MONO PCT %  --   --  2*   EOS PCT %  --   --  0    < > = values in this interval not displayed  Results from last 7 days   Lab Units 12/14/19  0711   POTASSIUM mmol/L 4 5   CHLORIDE mmol/L 114*   CO2 mmol/L 18*   BUN mg/dL 45*   CREATININE mg/dL 2 76*   CALCIUM mg/dL 8 1*   ALK PHOS U/L 88   ALT U/L 17   AST U/L 27     Results from last 7 days   Lab Units 12/09/19 2023   INR  1 21*       * I Have Reviewed All Lab Data Listed Above  * Additional Pertinent Lab Tests Reviewed: All Labs Within Last 24 Hours Reviewed    Imaging:    Imaging Reports Reviewed Today Include:   Imaging Personally Reviewed by Myself Includes:      Recent Cultures (last 7 days):     Results from last 7 days   Lab Units 12/14/19  0823 12/10/19  1633 12/09/19 2028 12/09/19 2023 12/09/19 2018   BLOOD CULTURE  Received in Microbiology Lab  Culture in Progress    --   --   --   --    GRAM STAIN RESULT   --   --  Gram negative rods* Gram negative rods*  --    URINE CULTURE   --   --   --   --  >100,000 cfu/ml Vancomycin Resistant Enterococcus faecium*  8966-7971 cfu/ml Gram-negative cintia- species*   C DIFF TOXIN B   --  Negative  --   --   --        Last 24 Hours Medication List:     Current Facility-Administered Medications:  acetaminophen 650 mg Oral Q6H PRN Kourtney Ortiz PA-C    aluminum-magnesium hydroxide-simethicone 30 mL Oral Q4H PRN Kourtney Ortiz PA-C    ARIPiprazole 30 mg Oral HS Lemon Hopping, DO    aspirin 81 mg Oral Daily Lemon Hopping, DO    busPIRone 5 mg Oral BID Lemon Hopping, DO cholecalciferol 3,000 Units Oral Daily Karlos Diamond, DO    cloNIDine 0 2 mg Oral [facility] Joana Navarrete MD    doxazosin 2 mg Oral HS Joana Navarrete MD    DULoxetine 60 mg Oral BID Karlos Diamond, DO    ferrous sulfate 325 mg Oral Daily With Breakfast Karlos Diamond, DO    folic acid 0,167 mcg Oral Daily Karlos Diamond, DO    heparin (porcine) 5,000 Units Subcutaneous [facility] Karlos Diamond, DO    hydrALAZINE 50 mg Oral Q8H [address] Joana Navarrete MD    insulin lispro 1-5 Units Subcutaneous HS Karlos Diamond, DO    insulin lispro 1-6 Units Subcutaneous TID AC Cristi Jacob MD    levothyroxine 125 mcg Oral Early Morning Karlos Diamond, DO    loperamide 4 mg Oral TID PRN Cristi Jacob MD    LORazepam 2 mg Oral TID PRN Karlos Diamond, DO    metoprolol tartrate 50 mg Oral Q12H [address] Karlos Diamond, DO    metroNIDAZOLE 500 mg Oral [facility] Adali Bravo MD    multi-electrolyte 75 mL/hr Intravenous Continuous Joana Navarrete MD Last Rate: 75 mL/hr (12/14/19 0328)   ondansetron 4 mg Intravenous Q4H PRN Adali Bravo MD    pravastatin 80 mg Oral Daily Karlos Diamond, DO    predniSONE 5 mg Oral Daily Karlos Diamond, DO    rOPINIRole 0 25 mg Oral BID Karlos Diamond, DO    saccharomyces boulardii 250 mg Oral BID Karlos Diamond, DO    sertraline 200 mg Oral Daily Karlos Diamond, DO    sevelamer 800 mg Oral TID With Meals aKrlos Diamond, DO    tacrolimus 1 5 mg Oral Daily Madai Pruitt DO    [START ON 12/15/2019] tacrolimus 2 mg Oral Daily Madai Pruitt DO         Today, Patient Was Seen By: Cristi Jacob MD    ** Please Note: This note has been constructed using a voice recognition system   **

## 2019-12-15 NOTE — PLAN OF CARE
Problem: Potential for Falls  Goal: Patient will remain free of falls  Description  INTERVENTIONS:  - Assess patient frequently for physical needs  -  Identify cognitive and physical deficits and behaviors that affect risk of falls  -  [place] fall precautions as indicated by assessment   - Educate patient/family on patient safety including physical limitations  - Instruct patient to call for assistance with activity based on assessment  - Modify environment to reduce risk of injury  - Consider OT/PT consult to assist with strengthening/mobility  Outcome: Progressing     Problem: Prexisting or High Potential for Compromised Skin Integrity  Goal: Skin integrity is maintained or improved  Description  INTERVENTIONS:  - Identify patients at risk for skin breakdown  - Assess and monitor skin integrity  - Assess and monitor nutrition and hydration status  - Monitor labs   - Assess for incontinence   - Turn and reposition patient  - Assist with mobility/ambulation  - Relieve pressure over bony prominences  - Avoid friction and shearing  - Provide appropriate hygiene as needed including keeping skin clean and dry  - Evaluate need for skin moisturizer/barrier cream  - Collaborate with interdisciplinary team   - Patient/family teaching  - Consider wound care consult   Outcome: Progressing     Problem: Nutrition/Hydration-ADULT  Goal: Nutrient/Hydration intake appropriate for improving, restoring or maintaining nutritional needs  Description  Monitor and assess patient's nutrition/hydration status for malnutrition  Collaborate with interdisciplinary team and initiate plan and interventions as ordered  Monitor patient's weight and dietary intake as ordered or per policy  Utilize nutrition screening tool and intervene as necessary  Determine patient's food preferences and provide high-protein, high-caloric foods as appropriate       INTERVENTIONS:  - Monitor oral intake, urinary output, labs, and treatment plans  - Assess nutrition and hydration status and recommend course of action  - Evaluate amount of meals eaten  - Assist patient with eating if necessary   - Allow adequate time for meals  - Recommend/ encourage appropriate diets, oral nutritional supplements, and vitamin/mineral supplements  - Order, calculate, and assess calorie counts as needed  - Recommend, monitor, and adjust tube feedings and TPN/PPN based on assessed needs  - Assess need for intravenous fluids  - Provide specific nutrition/hydration education as appropriate  - Include patient/family/caregiver in decisions related to nutrition  Outcome: Progressing     Problem: PAIN - ADULT  Goal: Verbalizes/displays adequate comfort level or baseline comfort level  Description  Interventions:  - Encourage patient to monitor pain and request assistance  - Assess pain using appropriate pain scale  - Administer analgesics based on type and severity of pain and evaluate response  - Implement non-pharmacological measures as appropriate and evaluate response  - Consider cultural and social influences on pain and pain management  - Notify physician/advanced practitioner if interventions unsuccessful or patient reports new pain  Outcome: Progressing     Problem: INFECTION - ADULT  Goal: Absence or prevention of progression during hospitalization  Description  INTERVENTIONS:  - Assess and monitor for signs and symptoms of infection  - Monitor lab/diagnostic results  - Monitor all insertion sites, i e  indwelling lines, tubes, and drains  - Monitor endotracheal if appropriate and nasal secretions for changes in amount and color  - [place] appropriate cooling/warming therapies per order  - Administer medications as ordered  - Instruct and encourage patient and family to use good hand hygiene technique  - Identify and instruct in appropriate isolation precautions for identified infection/condition  Outcome: Progressing  Goal: Absence of fever/infection during neutropenic period  Description  INTERVENTIONS:  - Monitor WBC    Outcome: Progressing     Problem: SAFETY ADULT  Goal: Maintain or return to baseline ADL function  Description  INTERVENTIONS:  -  Assess patient's ability to carry out ADLs; assess patient's baseline for ADL function and identify physical deficits which impact ability to perform ADLs (bathing, care of mouth/teeth, toileting, grooming, dressing, etc )  - Assess/evaluate cause of self-care deficits   - Assess range of motion  - Assess patient's mobility; develop plan if impaired  - Assess patient's need for assistive devices and provide as appropriate  - Encourage maximum independence but intervene and supervise when necessary  - Involve family in performance of ADLs  - Assess for home care needs following discharge   - Consider OT consult to assist with ADL evaluation and planning for discharge  - Provide patient education as appropriate  Outcome: Progressing  Goal: Maintain or return mobility status to optimal level  Description  INTERVENTIONS:  - Assess patient's baseline mobility status (ambulation, transfers, stairs, etc )    - Identify cognitive and physical deficits and behaviors that affect mobility  - Identify mobility aids required to assist with transfers and/or ambulation (gait belt, sit-to-stand, lift, walker, cane, etc )  - [place] fall precautions as indicated by assessment  - Record patient progress and toleration of activity level on Mobility SBAR; progress patient to next Phase/Stage  - Instruct patient to call for assistance with activity based on assessment  - Consider rehabilitation consult to assist with strengthening/weightbearing, etc   Outcome: Progressing     Problem: DISCHARGE PLANNING  Goal: Discharge to home or other facility with appropriate resources  Description  INTERVENTIONS:  - Identify barriers to discharge w/patient and caregiver  - Arrange for needed discharge resources and transportation as appropriate  - Identify discharge learning needs (meds, wound care, etc )  - Arrange for interpretive services to assist at discharge as needed  - Refer to Case Management Department for coordinating discharge planning if the patient needs post-hospital services based on physician/advanced practitioner order or complex needs related to functional status, cognitive ability, or social support system  Outcome: Progressing     Problem: Knowledge Deficit  Goal: Patient/family/caregiver demonstrates understanding of disease process, treatment plan, medications, and discharge instructions  Description  Complete learning assessment and assess knowledge base    Interventions:  - Provide teaching at level of understanding  - Provide teaching via preferred learning methods  Outcome: Progressing

## 2019-12-15 NOTE — ASSESSMENT & PLAN NOTE
· 2/2 BC from admission has GNR  · Was started on ceftriaxone and Flagyl by ID now ceftriaxone discontinued and continue Flagyl with plan of total 7 day course  · Source is suspected to be GI secondary to colitis  · Suspect may grow and aerobes per discussion with ID

## 2019-12-15 NOTE — ASSESSMENT & PLAN NOTE
· Significantly elevated on admission, suspect noncompliance and abdominal pain playing role in this  · Continue home antihypertensive regimen, blood pressure monitoring per protocol  · Was hypotensive, hence meds reduced by nephro norvasc stopped  · Now BPs somewhat high

## 2019-12-15 NOTE — PROGRESS NOTES
Progress Note - Brien Willett 1965, 53 y o  female MRN: [id number]    Unit/Bed#: -01 Encounter: [id number]    Primary Care Provider: Tera Monroe MD   Date and time admitted to hospital: 12/9/2019  8:29 PM    Addendum: BP up to 190, ordered prn hydralazine, also restarted amlodipine at lower dose of 5 mg & uptitrate    Bacteremia  Assessment & Plan  · 2/2 BC from admission growing anaerobes  · Was started on ceftriaxone and Flagyl by ID now ceftriaxone discontinued and continue Flagyl with plan of total 7 day course  · Source is suspected to be GI secondary to colitis  · Repeat BC from 12/14 pending    Metabolic acidosis  Assessment & Plan  · Appears acute on chronic  · S/P kidney and pancreatic transplant in 1998  · continue p o  and trend BMP  · Nephrology consultation    Multiple myeloma not having achieved remission ([facility] [address] )  Assessment & Plan  · Follows with Dr Freida Hooks as outpatient  · Continue Revlimid at home dosage  · Monitor hb, running low  · H/o transfusions    Essential hypertension  Assessment & Plan  · Significantly elevated on admission, suspect noncompliance and abdominal pain playing role in this  · Continue home antihypertensive regimen, blood pressure monitoring per protocol  · Was hypotensive, hence meds reduced by nephro, norvasc stopped  · Now BPs somewhat high    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 [facility])  Assessment & Plan  Lab Results   Component Value Date    HGBA1C 5 1 09/09/2019       Recent Labs     12/14/19  1553 12/14/19  2100 12/15/19  0631 12/15/19  1032   POCGLU 147* 109 109 134       Blood Sugar Average: Last 72 hrs:  · (P) 116 [id number] stopped Lantus (in place of [place]) 5 units qHS  · Cont SSI with accu-cheks and carb controlled diet  · Initiate hypoglycemia protocol and avoid hypoglycemia    Hypokalemia  Assessment & Plan  GI loss   replete  Resolved    Chronic kidney disease, stage 4 (severe) (HCC)  Assessment & Plan  · Creatinine worsened to 2 7 now better to 2 6 - monitor PVR - neg, tac levels, cont IVF per nephro - decrease rate to 50/hr  · ? ATN  · Monitor volume status, not all UO documented  · Monitor BMP while inpatient  · Avoid nephrotoxins as able, renally dose medications as indicated    Renal transplant recipient  Assessment & Plan  · On chronic immunosuppression with tacrolimus and prednisone  · Unfortunately patient noncompliant with all medications since discharge including these  · Tacrolimus level was 4 > 10 > 7 (12/12) > 5 (12/14)  · dose decreased to 2 BID on 12/12, again decrease to 2 mg QAM & 1 5 mg HS from 12/14  · Repeat levels on Tues    * Enterocolitis  Assessment & Plan  · With history of recurrent UTIs in setting of renal and pancreatic transplant  Most recently with VRE at recent admission to [address] [place] [address]  · Presenting now with abdominal pain/nausea and lethargy/confusion  · CT abdomen/pelvis showing colitis  · With positive BC with anaerobe, started on rocephin & flagyl per ID, deescalated to only Flagyl  · Stool cdiff neg, enteric panel neg, leucocytes neg  · procalcitonin 0 3  · Trend WBC, temperature curve  · Checked CMV PCR - negative  · Per pt her diarrhea & pain are same, imodium not helping - she tells me she had 3 loose bowel movements today, I talked to the nurses SIRS the PCA  The PCA has fever since 3:00 a m  And she has not seen any bowel movements with the patient, she had 1 urine output in bedside commode but no bowel movement at that time  Patient told the nurse that she had 1 bowel movement today morning  According to PCA also her bowel movement last night was formed  · Will not pursue CT abdomen        [facility] Internal Medicine Progress Note  Patient:  Lela Gee 53 y o  female   MRN: [id number]  PCP: Harjit Carranza MD  Unit/Bed#: -73 Encounter: [id number]  Date Of Visit: 12/15/19    Assessment:    Principal Problem:    Enterocolitis  Active Problems:    Renal transplant recipient    Chronic kidney disease, stage 4 (severe) (HCC)    Hypokalemia    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 ([facility] [address] )    Essential hypertension    Multiple myeloma not having achieved remission (HCC)    Metabolic acidosis    Diarrhea    Asymptomatic bacteriuria    Bacteremia      Plan:         VTE Pharmacologic Prophylaxis:   Pharmacologic: Heparin  Mechanical VTE Prophylaxis in Place: Yes    Patient Centered Rounds: I have performed bedside rounds with nursing staff today  Discussions with Specialists or Other Care Team Provider:     Education and Discussions with Family / Patient: patient    Time Spent for Care: 30 minutes  More than 50% of total time spent on counseling and coordination of care as described above  Current Length of Stay: 5 day(s)    Current Patient Status: Inpatient   Certification Statement: The patient will continue to require additional inpatient hospital stay due to monitor symptoms, labs    Discharge Plan / Estimated Discharge Date: eventual home PT    Code Status: Level 1 - Full Code      Subjective:   Feels ok, to me the patient tells that she has not yet good, has been feeling nauseous, has had 3 bowel movements which were loose watery this morning associated with lower abdominal pain  Upon discussion with nurse and PCA patient has not had any bowel movement this morning PCA has been here since 3:00 a m  And stated the patient had a urine output but no bowel movement with that  Objective:     Vitals:   Temp (24hrs), Av 6 °F (37 °C), Min:98 4 °F (36 9 °C), Max:98 7 °F (37 1 °C)    Temp:  [98 4 °F (36 9 °C)-98 7 °F (37 1 °C)] 98 6 °F (37 °C)  HR:  [59-68] 59  Resp:  [16-18] 16  BP: (147-174)/(60-74) 174/74  SpO2:  [93 %-94 %] 94 %  Body mass index is 37 21 kg/m²  Input and Output Summary (last 24 hours):        Intake/Output Summary (Last 24 hours) at 12/15/2019 1455  Last data filed at 12/15/2019 1425  Gross per 24 hour Intake 2321 25 ml   Output 2000 ml   Net 321 25 ml       Physical Exam:     Physical Exam   Constitutional: She is oriented to person, place, and time  She appears well-developed and well-nourished  HENT:   Head: Normocephalic and atraumatic  Mouth/Throat: Oropharynx is clear and moist    Eyes: Conjunctivae are normal    Cardiovascular: Normal rate and regular rhythm  Exam reveals no friction rub  No murmur heard  Pulmonary/Chest: Effort normal and breath sounds normal  No stridor  No respiratory distress  Abdominal: Soft  She exhibits no distension  There is no tenderness  Neurological: She is alert and oriented to person, place, and time  Vitals reviewed  Additional Data:     Labs:    Results from last 7 days   Lab Units 12/15/19  0431   WBC Thousand/uL 7 02   HEMOGLOBIN g/dL 7 3*   HEMATOCRIT % 23 9*   PLATELETS Thousands/uL 154   NEUTROS PCT % 58   LYMPHS PCT % 27   MONOS PCT % 8   EOS PCT % 4     Results from last 7 days   Lab Units 12/15/19  0431   POTASSIUM mmol/L 3 4*   CHLORIDE mmol/L 115*   CO2 mmol/L 19*   BUN mg/dL 43*   CREATININE mg/dL 2 63*   CALCIUM mg/dL 8 2*   ALK PHOS U/L 91   ALT U/L 18   AST U/L 11     Results from last 7 days   Lab Units 12/09/19 2023   INR  1 21*       * I Have Reviewed All Lab Data Listed Above  * Additional Pertinent Lab Tests Reviewed: All Labs Within Last 24 Hours Reviewed    Imaging:    Imaging Reports Reviewed Today Include:   Imaging Personally Reviewed by Myself Includes:     Recent Cultures (last 7 days):     Results from last 7 days   Lab Units 12/14/19  0823 12/10/19  1633 12/09/19 2028 12/09/19 2023 12/09/19 2018   BLOOD CULTURE  Received in Microbiology Lab  Culture in Progress    --  Gram-Negative Rods Anaerobic (Group)* Gram-Negative Rods Anaerobic (Group)*  --    GRAM STAIN RESULT   --   --  Gram negative rods* Gram negative rods*  --    URINE CULTURE   --   --   --   --  >100,000 cfu/ml Vancomycin Resistant Enterococcus faecium* 1858-2230 cfu/ml Gram-negative cintia- species*   C DIFF TOXIN B   --  Negative  --   --   --        Last 24 Hours Medication List:     Current Facility-Administered Medications:  acetaminophen 650 mg Oral Q6H PRN Kourtney Ortiz PA-C    aluminum-magnesium hydroxide-simethicone 30 mL Oral Q4H PRN Kourtney Ortiz PA-C    ARIPiprazole 30 mg Oral HS Shannan Lagunas, DO    aspirin 81 mg Oral Daily Shannan Hykaiser, DO    busPIRone 5 mg Oral BID Shannan Hykaiser, DO    cholecalciferol 3,000 Units Oral Daily Shannan Hykaiser, DO    cloNIDine 0 2 mg Oral Q8H [facility] Tamela Bautista MD    doxazosin 2 mg Oral HS Taemla Bautista MD    DULoxetine 60 mg Oral BID Shannan Lagunas, DO    ferrous sulfate 325 mg Oral Daily With Breakfast Shannan Lagunas, DO    folic acid 0,961 mcg Oral Daily Shannan Lagunas, DO    heparin (porcine) 5,000 Units Subcutaneous [facility] Shannan Lagunas, DO    hydrALAZINE 50 mg Oral Q8H [facility] Tamela Bautista MD    insulin lispro 1-5 Units Subcutaneous HS Shannan Lagunas, DO    insulin lispro 1-6 Units Subcutaneous TID AC Gloria Queen MD    levothyroxine 125 mcg Oral Early Morning Shannan Lagunas, DO    loperamide 4 mg Oral TID PRN Gloria Queen MD    LORazepam 2 mg Oral TID PRN Shannan Lagunas, DO    metoprolol tartrate 50 mg Oral Q12H [facility] Shannan Lagunas, DO    metroNIDAZOLE 500 mg Oral [facility] Apryl Morris MD    multi-electrolyte 50 mL/hr Intravenous Continuous Claudene AlexDO Last Rate: 50 mL/hr (12/15/19 1425)   ondansetron 4 mg Intravenous Q4H PRN Apryl Morris MD    pravastatin 80 mg Oral Daily Shannan Hykaiser, DO    predniSONE 5 mg Oral Daily Shannan Hylan, DO    rOPINIRole 0 25 mg Oral BID Shannan Lagunas, DO    saccharomyces boulardii 250 mg Oral BID Shannan Hykaiser, DO    sertraline 200 mg Oral Daily Shannan Hykaiser, DO    sevelamer 800 mg Oral TID With Meals Shannan Lagunas, DO    tacrolimus 1 5 mg Oral Daily Seamus Fu, DO    tacrolimus 2 mg Oral Daily Claudene Spates, DO         Today, Patient Was Seen By: Denise Solomon MD    ** Please Note: This note has been constructed using a voice recognition system   **

## 2019-12-15 NOTE — ASSESSMENT & PLAN NOTE
· On chronic immunosuppression with tacrolimus and prednisone  · Unfortunately patient noncompliant with all medications since discharge including these  · Tacrolimus level was 4 > 10 > 7 (12/12) > 5 (12/14)  · dose decreased to 2 BID on 12/12, again decrease to 2 mg QAM & 1 5 mg HS from 12/14  · Repeat levels on Tues

## 2019-12-16 ENCOUNTER — TELEPHONE (OUTPATIENT)
Dept: HEMATOLOGY ONCOLOGY | Facility: CLINIC | Age: 54
End: 2019-12-16

## 2019-12-16 LAB
ANION GAP SERPL CALCULATED.3IONS-SCNC: 10 MMOL/L (ref 4–13)
ANISOCYTOSIS BLD QL SMEAR: PRESENT
BASOPHILS # BLD MANUAL: 0.14 THOUSAND/UL (ref 0–0.1)
BASOPHILS NFR MAR MANUAL: 2 % (ref 0–1)
BUN SERPL-MCNC: 36 MG/DL (ref 5–25)
CALCIUM SERPL-MCNC: 8.3 MG/DL (ref 8.3–10.1)
CHLORIDE SERPL-SCNC: 116 MMOL/L (ref 100–108)
CO2 SERPL-SCNC: 18 MMOL/L (ref 21–32)
CREAT SERPL-MCNC: 2.44 MG/DL (ref 0.6–1.3)
EOSINOPHIL # BLD MANUAL: 0.27 THOUSAND/UL (ref 0–0.4)
EOSINOPHIL NFR BLD MANUAL: 4 % (ref 0–6)
ERYTHROCYTE [DISTWIDTH] IN BLOOD BY AUTOMATED COUNT: 16.3 % (ref 11.6–15.1)
GFR SERPL CREATININE-BSD FRML MDRD: 22 ML/MIN/1.73SQ M
GLUCOSE SERPL-MCNC: 105 MG/DL (ref 65–140)
GLUCOSE SERPL-MCNC: 109 MG/DL (ref 65–140)
GLUCOSE SERPL-MCNC: 113 MG/DL (ref 65–140)
GLUCOSE SERPL-MCNC: 114 MG/DL (ref 65–140)
GLUCOSE SERPL-MCNC: 120 MG/DL (ref 65–140)
HCT VFR BLD AUTO: 25.4 % (ref 34.8–46.1)
HGB BLD-MCNC: 7.8 G/DL (ref 11.5–15.4)
LYMPHOCYTES # BLD AUTO: 1.17 THOUSAND/UL (ref 0.6–4.47)
LYMPHOCYTES # BLD AUTO: 17 % (ref 14–44)
MACROCYTES BLD QL AUTO: PRESENT
MCH RBC QN AUTO: 31.6 PG (ref 26.8–34.3)
MCHC RBC AUTO-ENTMCNC: 30.7 G/DL (ref 31.4–37.4)
MCV RBC AUTO: 103 FL (ref 82–98)
METAMYELOCYTES NFR BLD MANUAL: 1 % (ref 0–1)
MONOCYTES # BLD AUTO: 0.21 THOUSAND/UL (ref 0–1.22)
MONOCYTES NFR BLD: 3 % (ref 4–12)
NEUTROPHILS # BLD MANUAL: 4.94 THOUSAND/UL (ref 1.85–7.62)
NEUTS SEG NFR BLD AUTO: 72 % (ref 43–75)
NRBC BLD AUTO-RTO: 0 /100 WBCS
PLATELET # BLD AUTO: 166 THOUSANDS/UL (ref 149–390)
PLATELET BLD QL SMEAR: ADEQUATE
PMV BLD AUTO: 12.1 FL (ref 8.9–12.7)
POLYCHROMASIA BLD QL SMEAR: PRESENT
POTASSIUM SERPL-SCNC: 3.5 MMOL/L (ref 3.5–5.3)
RBC # BLD AUTO: 2.47 MILLION/UL (ref 3.81–5.12)
RBC MORPH BLD: PRESENT
SODIUM SERPL-SCNC: 144 MMOL/L (ref 136–145)
VARIANT LYMPHS # BLD AUTO: 1 %
WBC # BLD AUTO: 6.86 THOUSAND/UL (ref 4.31–10.16)

## 2019-12-16 PROCEDURE — 85007 BL SMEAR W/DIFF WBC COUNT: CPT | Performed by: HOSPITALIST

## 2019-12-16 PROCEDURE — 99232 SBSQ HOSP IP/OBS MODERATE 35: CPT | Performed by: HOSPITALIST

## 2019-12-16 PROCEDURE — 99232 SBSQ HOSP IP/OBS MODERATE 35: CPT | Performed by: INTERNAL MEDICINE

## 2019-12-16 PROCEDURE — 85027 COMPLETE CBC AUTOMATED: CPT | Performed by: HOSPITALIST

## 2019-12-16 PROCEDURE — 82948 REAGENT STRIP/BLOOD GLUCOSE: CPT

## 2019-12-16 PROCEDURE — G0515 COGNITIVE SKILLS DEVELOPMENT: HCPCS

## 2019-12-16 PROCEDURE — 80048 BASIC METABOLIC PNL TOTAL CA: CPT | Performed by: HOSPITALIST

## 2019-12-16 PROCEDURE — 97535 SELF CARE MNGMENT TRAINING: CPT

## 2019-12-16 PROCEDURE — 97530 THERAPEUTIC ACTIVITIES: CPT

## 2019-12-16 RX ORDER — SODIUM BICARBONATE 650 MG/1
1300 TABLET ORAL
Status: DISCONTINUED | OUTPATIENT
Start: 2019-12-16 | End: 2019-12-31

## 2019-12-16 RX ORDER — TACROLIMUS 1 MG/1
2 CAPSULE ORAL
Status: DISCONTINUED | OUTPATIENT
Start: 2019-12-16 | End: 2020-01-02 | Stop reason: HOSPADM

## 2019-12-16 RX ORDER — AMLODIPINE BESYLATE 10 MG/1
10 TABLET ORAL DAILY
Status: DISCONTINUED | OUTPATIENT
Start: 2019-12-17 | End: 2019-12-23

## 2019-12-16 RX ORDER — AMLODIPINE BESYLATE 5 MG/1
5 TABLET ORAL ONCE
Status: COMPLETED | OUTPATIENT
Start: 2019-12-16 | End: 2019-12-16

## 2019-12-16 RX ORDER — DICYCLOMINE HYDROCHLORIDE 10 MG/1
10 CAPSULE ORAL 3 TIMES DAILY PRN
Status: DISCONTINUED | OUTPATIENT
Start: 2019-12-16 | End: 2019-12-22

## 2019-12-16 RX ADMIN — SODIUM CHLORIDE, SODIUM GLUCONATE, SODIUM ACETATE, POTASSIUM CHLORIDE, MAGNESIUM CHLORIDE, SODIUM PHOSPHATE, DIBASIC, AND POTASSIUM PHOSPHATE 50 ML/HR: .53; .5; .37; .037; .03; .012; .00082 INJECTION, SOLUTION INTRAVENOUS at 09:16

## 2019-12-16 RX ADMIN — BUSPIRONE HYDROCHLORIDE 5 MG: 5 TABLET ORAL at 09:11

## 2019-12-16 RX ADMIN — ASPIRIN 81 MG 81 MG: 81 TABLET ORAL at 09:12

## 2019-12-16 RX ADMIN — ACETAMINOPHEN 650 MG: 325 TABLET ORAL at 10:59

## 2019-12-16 RX ADMIN — PREDNISONE 5 MG: 5 TABLET ORAL at 09:14

## 2019-12-16 RX ADMIN — ROPINIROLE 0.25 MG: 0.25 TABLET, FILM COATED ORAL at 09:11

## 2019-12-16 RX ADMIN — ONDANSETRON 4 MG: 2 INJECTION INTRAMUSCULAR; INTRAVENOUS at 06:39

## 2019-12-16 RX ADMIN — DOXAZOSIN 2 MG: 2 TABLET ORAL at 22:00

## 2019-12-16 RX ADMIN — LORAZEPAM 2 MG: 1 TABLET ORAL at 11:00

## 2019-12-16 RX ADMIN — CLONIDINE HYDROCHLORIDE 0.2 MG: 0.1 TABLET ORAL at 12:52

## 2019-12-16 RX ADMIN — DULOXETINE HYDROCHLORIDE 60 MG: 60 CAPSULE, DELAYED RELEASE ORAL at 09:11

## 2019-12-16 RX ADMIN — PRAVASTATIN SODIUM 80 MG: 80 TABLET ORAL at 09:11

## 2019-12-16 RX ADMIN — SODIUM BICARBONATE 650 MG TABLET 1300 MG: at 17:42

## 2019-12-16 RX ADMIN — SEVELAMER HYDROCHLORIDE 800 MG: 800 TABLET, FILM COATED PARENTERAL at 15:47

## 2019-12-16 RX ADMIN — FERROUS SULFATE TAB 325 MG (65 MG ELEMENTAL FE) 325 MG: 325 (65 FE) TAB at 09:12

## 2019-12-16 RX ADMIN — SEVELAMER HYDROCHLORIDE 800 MG: 800 TABLET, FILM COATED PARENTERAL at 12:51

## 2019-12-16 RX ADMIN — SERTRALINE HYDROCHLORIDE 200 MG: 100 TABLET ORAL at 09:12

## 2019-12-16 RX ADMIN — METRONIDAZOLE 500 MG: 500 TABLET, FILM COATED ORAL at 05:14

## 2019-12-16 RX ADMIN — AMLODIPINE BESYLATE 5 MG: 5 TABLET ORAL at 09:12

## 2019-12-16 RX ADMIN — HEPARIN SODIUM 5000 UNITS: 5000 INJECTION INTRAVENOUS; SUBCUTANEOUS at 12:54

## 2019-12-16 RX ADMIN — MELATONIN 3000 UNITS: at 09:13

## 2019-12-16 RX ADMIN — METOPROLOL TARTRATE 50 MG: 50 TABLET, FILM COATED ORAL at 20:51

## 2019-12-16 RX ADMIN — ONDANSETRON 4 MG: 2 INJECTION INTRAMUSCULAR; INTRAVENOUS at 20:51

## 2019-12-16 RX ADMIN — LEVOTHYROXINE SODIUM 125 MCG: 125 TABLET ORAL at 05:14

## 2019-12-16 RX ADMIN — HEPARIN SODIUM 5000 UNITS: 5000 INJECTION INTRAVENOUS; SUBCUTANEOUS at 22:00

## 2019-12-16 RX ADMIN — HYDRALAZINE HYDROCHLORIDE 50 MG: 50 TABLET, FILM COATED ORAL at 12:52

## 2019-12-16 RX ADMIN — TACROLIMUS 2 MG: 1 CAPSULE ORAL at 09:15

## 2019-12-16 RX ADMIN — ONDANSETRON 4 MG: 2 INJECTION INTRAMUSCULAR; INTRAVENOUS at 10:58

## 2019-12-16 RX ADMIN — HYDRALAZINE HYDROCHLORIDE 5 MG: 20 INJECTION INTRAMUSCULAR; INTRAVENOUS at 17:33

## 2019-12-16 RX ADMIN — Medication 250 MG: at 09:11

## 2019-12-16 RX ADMIN — ROPINIROLE 0.25 MG: 0.25 TABLET, FILM COATED ORAL at 17:31

## 2019-12-16 RX ADMIN — CLONIDINE HYDROCHLORIDE 0.2 MG: 0.1 TABLET ORAL at 05:16

## 2019-12-16 RX ADMIN — ARIPIPRAZOLE 30 MG: 10 TABLET ORAL at 22:00

## 2019-12-16 RX ADMIN — HYDRALAZINE HYDROCHLORIDE 50 MG: 50 TABLET, FILM COATED ORAL at 22:00

## 2019-12-16 RX ADMIN — ONDANSETRON 4 MG: 2 INJECTION INTRAMUSCULAR; INTRAVENOUS at 15:40

## 2019-12-16 RX ADMIN — HYDRALAZINE HYDROCHLORIDE 50 MG: 50 TABLET, FILM COATED ORAL at 05:16

## 2019-12-16 RX ADMIN — SEVELAMER HYDROCHLORIDE 800 MG: 800 TABLET, FILM COATED PARENTERAL at 09:13

## 2019-12-16 RX ADMIN — CLONIDINE HYDROCHLORIDE 0.2 MG: 0.1 TABLET ORAL at 22:00

## 2019-12-16 RX ADMIN — METOPROLOL TARTRATE 50 MG: 50 TABLET, FILM COATED ORAL at 09:14

## 2019-12-16 RX ADMIN — AMLODIPINE BESYLATE 5 MG: 5 TABLET ORAL at 18:39

## 2019-12-16 RX ADMIN — HEPARIN SODIUM 5000 UNITS: 5000 INJECTION INTRAVENOUS; SUBCUTANEOUS at 05:15

## 2019-12-16 RX ADMIN — TACROLIMUS 2 MG: 1 CAPSULE ORAL at 20:51

## 2019-12-16 RX ADMIN — BUSPIRONE HYDROCHLORIDE 5 MG: 5 TABLET ORAL at 17:31

## 2019-12-16 RX ADMIN — DULOXETINE HYDROCHLORIDE 60 MG: 60 CAPSULE, DELAYED RELEASE ORAL at 17:31

## 2019-12-16 RX ADMIN — Medication 250 MG: at 17:31

## 2019-12-16 RX ADMIN — DICYCLOMINE HYDROCHLORIDE 10 MG: 10 CAPSULE ORAL at 15:47

## 2019-12-16 RX ADMIN — FOLIC ACID 1000 MCG: 1 TABLET ORAL at 09:11

## 2019-12-16 NOTE — SOCIAL WORK
CM met with pt and pt's father, Gonzalo Menchaca [phone] at bedside  CM informed them of the change in recommendation for the pt  Pt recommended for IP STR from OT, pending PT re-evaluation  CM educated pt and pt's father on placement options  CM provided a list and explained the High Performing Facilities in network with pt's insurance  CM explained the process of picking facilities and sending referrals  Referrals sent are for the facilities to be sure they can meet pt's needs  Pt's father and pt understood  CM provided phone #  Pt's father will be at bedside tomorrow after 3pm with choices  CM expressed picking 3-4 options  IP list left at bedside with pt and pt's father

## 2019-12-16 NOTE — SOCIAL WORK
OT update:     OT Jan advised that the patient will require 24/7 supervision d/t poor cog and poor teach back  Mark Prado reported that the patient is at risk for med management and self-care  CM advised Dr Naida Poole and asked for possible Neuro-Psych consult at this time to determine who can make dc planning decisions; the patient or her family  Dr Naida Poole stated she will order neuro-psych eval  MSW CM Felipa advised  CM will follow

## 2019-12-16 NOTE — RESTORATIVE TECHNICIAN NOTE
Restorative Specialist Mobility Note       Activity: Ambulate in barron, Ambulate in room, Bathroom privileges, Chair, Dangle, Stand at bedside(Educated/encouraged pt to ambulate with assistance 3-4 x's/day  Chair alarm on   Pt callbell, phone/tray within reach )     Assistive Device: Front wheel walker          Angélica STONE, Restorative Technician, [employer]

## 2019-12-16 NOTE — PROGRESS NOTES
Progress Note - Infectious Disease   Christine Saldivar 53 y o  female MRN: [id number]  Unit/Bed#: -01 Encounter: [id number]      Impression/Recommendations:  1   Anaerobic Gram-negative bacteremia  Shabana Flint GI translocation in setting of #2   CT A/P otherwise negative  Clinically stable without sepsis  Clinically improved prior to initiation of antibiotics  Treatment course now complicated by side effects of Flagyl     Rec:  ? As suspect bacteremia was transient, patient clinically improved prior to antibiotics, and now having adverse effects from Flagyl, will D/C antibiotics today to complete treatment course  ? Follow temperatures closely     2   Acute enterocolitis   Suspect acute viral infection   Stool enteric PCR, C  Diff negative   Procalcitonin bland   Clinically stable without sepsis   Abdominal exam benign   Improved    Rec:  ? Serial abdominal exams  ? Supportive care as per the primary service     3   Asymptomatic bacteriuria   Seen on UA   Likely chronically colonized  Gail Jacobsen active symptoms of UTI  Rec:  ? No additional antibiotics indicated     4   Recent VRE UTI   Status post 7 days daptomycin with clinical improvement     5   History of renal/pancreatic transplant      Has pancreatic secretions draining in to bladder   On chronic immunosuppression      6   CKD   Stable at baseline creatinine 2  5      7   MM   Recently started on Revlimid      The patient is stable from an ID standpoint      Antibiotics:  Flagyl #5    Subjective:  Patient seen on AM rounds  Complains of nausea and metallic taste in mouth  No BM  Eating OK  No vomiting  24 Hour Events:  No documented fevers, chills, sweats, nausea, vomiting, or diarrhea       Objective:  Vitals:  Temp:  [97 9 °F (36 6 °C)-98 4 °F (36 9 °C)] 98 °F (36 7 °C)  HR:  [63-71] 71  Resp:  [18] 18  BP: (159-195)/(61-70) 167/61  SpO2:  [92 %-95 %] 95 %  Temp (24hrs), Av 1 °F (36 7 °C), Min:97 9 °F (36 6 °C), Max:98 4 °F (36 9 °C)  Current: Temperature: 98 °F (36 7 °C)    Physical Exam:   General:  No acute distress  Psychiatric:  Awake and alert  Pulmonary:  Normal respiratory excursion without accessory muscle use  Abdomen:  Soft, nontender, slightly distended  Extremities:  No edema  Skin:  No rashes    Lab Results:  I have personally reviewed pertinent labs  Results from last 7 days   Lab Units 12/16/19  0522 12/15/19  0431 12/14/19  0711 12/13/19  0555   POTASSIUM mmol/L 3 5 3 4* 4 5 3 4*   CHLORIDE mmol/L 116* 115* 114* 114*   CO2 mmol/L 18* 19* 18* 19*   BUN mg/dL 36* 43* 45* 40*   CREATININE mg/dL 2 44* 2 63* 2 76* 2 58*   EGFR ml/min/1 73sq m 22 20 19 20   CALCIUM mg/dL 8 3 8 2* 8 1* 7 9*   AST U/L  --  11 27 15   ALT U/L  --  18 17 16   ALK PHOS U/L  --  91 88 91     Results from last 7 days   Lab Units 12/16/19  0522 12/15/19  0431 12/14/19  0711   WBC Thousand/uL 6 86 7 02 7 36   HEMOGLOBIN g/dL 7 8* 7 3* 7 5*   PLATELETS Thousands/uL 166 154 149     Results from last 7 days   Lab Units 12/14/19  0823 12/10/19  1633 12/09/19 2028 12/09/19 2023 12/09/19  2018   BLOOD CULTURE  No Growth at 24 hrs   --  Gram-Negative Rods Anaerobic (Group)* Gram-Negative Rods Anaerobic (Group)*  --    GRAM STAIN RESULT   --   --  Gram negative rods* Gram negative rods*  --    URINE CULTURE   --   --   --   --  >100,000 cfu/ml Vancomycin Resistant Enterococcus faecium*  9526-3409 cfu/ml Gram-negative cintia- species*   C DIFF TOXIN B   --  Negative  --   --   --        Imaging Studies:   I have personally reviewed pertinent imaging study reports and images in PACS  EKG, Pathology, and Other Studies:   I have personally reviewed pertinent reports

## 2019-12-16 NOTE — PLAN OF CARE
Problem: Potential for Falls  Goal: Patient will remain free of falls  Description  INTERVENTIONS:  - Assess patient frequently for physical needs  -  Identify cognitive and physical deficits and behaviors that affect risk of falls  -  [place] fall precautions as indicated by assessment   - Educate patient/family on patient safety including physical limitations  - Instruct patient to call for assistance with activity based on assessment  - Modify environment to reduce risk of injury  - Consider OT/PT consult to assist with strengthening/mobility  Outcome: Progressing     Problem: Prexisting or High Potential for Compromised Skin Integrity  Goal: Skin integrity is maintained or improved  Description  INTERVENTIONS:  - Identify patients at risk for skin breakdown  - Assess and monitor skin integrity  - Assess and monitor nutrition and hydration status  - Monitor labs   - Assess for incontinence   - Turn and reposition patient  - Assist with mobility/ambulation  - Relieve pressure over bony prominences  - Avoid friction and shearing  - Provide appropriate hygiene as needed including keeping skin clean and dry  - Evaluate need for skin moisturizer/barrier cream  - Collaborate with interdisciplinary team   - Patient/family teaching  - Consider wound care consult   Outcome: Progressing     Problem: Nutrition/Hydration-ADULT  Goal: Nutrient/Hydration intake appropriate for improving, restoring or maintaining nutritional needs  Description  Monitor and assess patient's nutrition/hydration status for malnutrition  Collaborate with interdisciplinary team and initiate plan and interventions as ordered  Monitor patient's weight and dietary intake as ordered or per policy  Utilize nutrition screening tool and intervene as necessary  Determine patient's food preferences and provide high-protein, high-caloric foods as appropriate       INTERVENTIONS:  - Monitor oral intake, urinary output, labs, and treatment plans  - Assess nutrition and hydration status and recommend course of action  - Evaluate amount of meals eaten  - Assist patient with eating if necessary   - Allow adequate time for meals  - Recommend/ encourage appropriate diets, oral nutritional supplements, and vitamin/mineral supplements  - Order, calculate, and assess calorie counts as needed  - Recommend, monitor, and adjust tube feedings and TPN/PPN based on assessed needs  - Assess need for intravenous fluids  - Provide specific nutrition/hydration education as appropriate  - Include patient/family/caregiver in decisions related to nutrition  Outcome: Progressing     Problem: PAIN - ADULT  Goal: Verbalizes/displays adequate comfort level or baseline comfort level  Description  Interventions:  - Encourage patient to monitor pain and request assistance  - Assess pain using appropriate pain scale  - Administer analgesics based on type and severity of pain and evaluate response  - Implement non-pharmacological measures as appropriate and evaluate response  - Consider cultural and social influences on pain and pain management  - Notify physician/advanced practitioner if interventions unsuccessful or patient reports new pain  Outcome: Progressing     Problem: INFECTION - ADULT  Goal: Absence or prevention of progression during hospitalization  Description  INTERVENTIONS:  - Assess and monitor for signs and symptoms of infection  - Monitor lab/diagnostic results  - Monitor all insertion sites, i e  indwelling lines, tubes, and drains  - Monitor endotracheal if appropriate and nasal secretions for changes in amount and color  - [place] appropriate cooling/warming therapies per order  - Administer medications as ordered  - Instruct and encourage patient and family to use good hand hygiene technique  - Identify and instruct in appropriate isolation precautions for identified infection/condition  Outcome: Progressing  Goal: Absence of fever/infection during neutropenic period  Description  INTERVENTIONS:  - Monitor WBC    Outcome: Progressing     Problem: SAFETY ADULT  Goal: Maintain or return to baseline ADL function  Description  INTERVENTIONS:  -  Assess patient's ability to carry out ADLs; assess patient's baseline for ADL function and identify physical deficits which impact ability to perform ADLs (bathing, care of mouth/teeth, toileting, grooming, dressing, etc )  - Assess/evaluate cause of self-care deficits   - Assess range of motion  - Assess patient's mobility; develop plan if impaired  - Assess patient's need for assistive devices and provide as appropriate  - Encourage maximum independence but intervene and supervise when necessary  - Involve family in performance of ADLs  - Assess for home care needs following discharge   - Consider OT consult to assist with ADL evaluation and planning for discharge  - Provide patient education as appropriate  Outcome: Progressing  Goal: Maintain or return mobility status to optimal level  Description  INTERVENTIONS:  - Assess patient's baseline mobility status (ambulation, transfers, stairs, etc )    - Identify cognitive and physical deficits and behaviors that affect mobility  - Identify mobility aids required to assist with transfers and/or ambulation (gait belt, sit-to-stand, lift, walker, cane, etc )  - [place] fall precautions as indicated by assessment  - Record patient progress and toleration of activity level on Mobility SBAR; progress patient to next Phase/Stage  - Instruct patient to call for assistance with activity based on assessment  - Consider rehabilitation consult to assist with strengthening/weightbearing, etc   Outcome: Progressing     Problem: DISCHARGE PLANNING  Goal: Discharge to home or other facility with appropriate resources  Description  INTERVENTIONS:  - Identify barriers to discharge w/patient and caregiver  - Arrange for needed discharge resources and transportation as appropriate  - Identify discharge learning needs (meds, wound care, etc )  - Arrange for interpretive services to assist at discharge as needed  - Refer to Case Management Department for coordinating discharge planning if the patient needs post-hospital services based on physician/advanced practitioner order or complex needs related to functional status, cognitive ability, or social support system  Outcome: Progressing     Problem: Knowledge Deficit  Goal: Patient/family/caregiver demonstrates understanding of disease process, treatment plan, medications, and discharge instructions  Description  Complete learning assessment and assess knowledge base    Interventions:  - Provide teaching at level of understanding  - Provide teaching via preferred learning methods  Outcome: Progressing

## 2019-12-16 NOTE — TELEPHONE ENCOUNTER
CALL FROM [facility]    CHECKING ON PT AND WANTS TO KNOW IF PT WILL BE CONTINUING REVLIMID   THEY WOULD LIKE A RETURN CALL

## 2019-12-16 NOTE — PLAN OF CARE
Problem: OCCUPATIONAL THERAPY ADULT  Goal: Performs self-care activities at highest level of function for planned discharge setting  See evaluation for individualized goals  Description  Treatment Interventions: ADL retraining, Functional transfer training, Endurance training, Cognitive reorientation, Patient/family training, Equipment evaluation/education, Compensatory technique education, Continued evaluation, Energy conservation, Activityengagement          See flowsheet documentation for full assessment, interventions and recommendations  Outcome: Progressing  Note:   Limitation: Decreased ADL status, Decreased cognition, Decreased endurance, Decreased Safe judgement during ADL, Decreased high-level ADLs  Prognosis: Fair  Assessment: Pt was seen this date for OT tx session focusing on self care tasks, sit to stand progressions, trafners, toileting, carryover of educaiton, particiaption in ACLS for appropraite d/c recommendations and overall activity toelrance  Pt presents seated OOB in chair, cmopletes previously mentioned tasks at doccumented assist levels above, see flow sheet  Pt agreeable and particiapted in ACLS, scored 4 2/6 0 where 24 hour supervision is recommended and pt should NOT drive  PLease see below for greater details on same  Pt is easily frustrated with difficult tasks dmeosntrated decreased problm solving and poor new learning skills  Pt continues to require min A for tranfer and mobility tasks  Pt lives alone, nearest support is her paretns who reportedly live about 15 mins away  Spoke with OTR/L about pt current level of function, agree that most approatie d/c recommendation would be STR at thtis time  Continue to follow with current POC to maximize overall functional abilites         OT Discharge Recommendation: Short Term Rehab(and 24 hour supervision)  OT - OK to Discharge: (when medically cleared)

## 2019-12-16 NOTE — PROGRESS NOTES
NEPHROLOGY PROGRESS NOTE    Shelly Leggett 53 y o  female MRN: [id number]  Unit/Bed#: -01 Encounter: [id number]  Reason for Consult:  Chronic kidney disease and renal allograft    Patient is sitting in the chair says she is really not feeling well has not seen in days feels that the Metronidazole was upsetting her stomach she was nauseous and has abdominal pain  Also she says the Zofran does not really help the nausea  ASSESSMENT/PLAN:  1  Renal    Patient's chronic kidney disease in her renal transplant and creatinine is at her baseline level which is generally between 2 and 2 5  Recently she has been hospitalized for lengthy stays due to urinary tract infection and sepsis  This point creatinine is roughly around baseline  Immunosuppression consists of prednisone and tacrolimus  Tacrolimus trough level was 5 which is in therapeutic range so I am going to continue with dosing at 2 mg every 12 hours as that is what her dose was  Since the patient is not eating much she has been placed on maintenance IV fluids until she eats better  Increased tacrolimus to 2 mg every 12 hours  Will check level later in the week  Monitor renal function and    2  Metabolic acidosis    Patient's history of pancreatic transplant in this drains excrete fluids into the bladder so his chronic bicarb losses    3  Bacteremia    Patient presented with diarrhea have gram-negative bacteremia  Infectious Disease consult was monitoring and suspected that the bacteremia was transient due to enterocolitis  At this point Flagyl has been discontinued and they will monitor with Infectious Disease follow-up  C diff was negative  3  Frequent urinary tract infections    Infectious disease was reviewed did not feel was active symptomatic UTI likely is colonization  This was a different organism than the bacteremia  Recommendations per Infectious Disease      4  History of multiple myeloma on therapy per Hematology Oncology  SUBJECTIVE:  Review of Systems   Constitution: Positive for decreased appetite and malaise/fatigue  Negative for chills and fever  HENT: Negative  Eyes: Negative  Cardiovascular: Negative  Negative for chest pain, dyspnea on exertion, orthopnea and palpitations  Respiratory: Negative  Negative for cough, shortness of breath, sputum production and wheezing  Skin: Negative  Gastrointestinal: Positive for abdominal pain, anorexia, diarrhea and nausea  Negative for bloating and vomiting  Genitourinary: Negative for bladder incontinence, dysuria, hematuria and incomplete emptying  Neurological: Negative  Psychiatric/Behavioral: Negative  OBJECTIVE:  Current Weight: Weight - Scale: 111 kg (245 lb 9 5 oz)  Vitals:Temp (24hrs), Av 1 °F (36 7 °C), Min:97 9 °F (36 6 °C), Max:98 4 °F (36 9 °C)  Current: Temperature: 98 °F (36 7 °C)   Blood pressure 167/61, pulse 71, temperature 98 °F (36 7 °C), resp  rate 18, height 5' 8" (1 727 m), weight 111 kg (245 lb 9 5 oz), SpO2 95 %  , Body mass index is 37 34 kg/m²  Intake/Output Summary (Last 24 hours) at 2019 1214  Last data filed at 2019 1101  Gross per 24 hour   Intake 3674 58 ml   Output 2375 ml   Net 1299 58 ml       Physical Exam: /61   Pulse 71   Temp 98 °F (36 7 °C)   Resp 18   Ht 5' 8" (1 727 m)   Wt 111 kg (245 lb 9 5 oz)   LMP  (LMP Unknown)   SpO2 95%   BMI 37 34 kg/m²   Physical Exam   Constitutional: She is oriented to person, place, and time  Non-toxic appearance  She does not appear ill  No distress  HENT:   Head: Normocephalic and atraumatic  Mouth/Throat: Oropharynx is clear and moist    Eyes: EOM are normal  No scleral icterus  Cardiovascular: Normal rate and regular rhythm  Exam reveals no friction rub  No murmur heard  Pulmonary/Chest: Effort normal and breath sounds normal  No respiratory distress  She has no wheezes  She has no rales  Abdominal: Soft   Normal appearance and bowel sounds are normal  She exhibits no distension  There is no tenderness  Neurological: She is alert and oriented to person, place, and time  Skin: Skin is warm and dry  No rash noted  No erythema  Psychiatric: She has a normal mood and affect         Medications:    Current Facility-Administered Medications:     acetaminophen (TYLENOL) tablet 650 mg, 650 mg, Oral, Q6H PRN, JODI Hi-CELIA, 650 mg at 12/16/19 1059    aluminum-magnesium hydroxide-simethicone (MYLANTA) 200-200-20 mg/5 mL oral suspension 30 mL, 30 mL, Oral, Q4H PRN, Kourtney Ortiz PA-C, 30 mL at 12/11/19 0148    amLODIPine (NORVASC) tablet 5 mg, 5 mg, Oral, Daily, Melissa Lyle MD, 5 mg at 12/16/19 0912    ARIPiprazole (ABILIFY) tablet 30 mg, 30 mg, Oral, HS, Lincoln Dorsey DO, 30 mg at 12/15/19 2119    aspirin chewable tablet 81 mg, 81 mg, Oral, Daily, Lincoln Dorsey DO, 81 mg at 12/16/19 0912    busPIRone (BUSPAR) tablet 5 mg, 5 mg, Oral, BID, Lincoln Dorsey DO, 5 mg at 12/16/19 0911    cholecalciferol (VITAMIN D3) tablet 3,000 Units, 3,000 Units, Oral, Daily, Lincoln Dorsey DO, 3,000 Units at 12/16/19 0913    cloNIDine (CATAPRES) tablet 0 2 mg, 0 2 mg, Oral, Q8H [facility], Carlos Alejo MD, 0 2 mg at 12/16/19 0516    doxazosin (CARDURA) tablet 2 mg, 2 mg, Oral, HS, Carlos Alejo MD, 2 mg at 12/15/19 2118    DULoxetine (CYMBALTA) delayed release capsule 60 mg, 60 mg, Oral, BID, Lincoln Dorsey DO, 60 mg at 12/16/19 0911    ferrous sulfate tablet 325 mg, 325 mg, Oral, Daily With Breakfast, Lincoln Dorsey DO, 325 mg at 97/76/59 1918    folic acid (FOLVITE) tablet 1,000 mcg, 1,000 mcg, Oral, Daily, Lincoln Dorsey DO, 1,000 mcg at 12/16/19 0911    heparin (porcine) subcutaneous injection 5,000 Units, 5,000 Units, Subcutaneous, Q8H [facility] term, 5,000 Units at 12/16/19 0515 **AND** [CANCELED] Platelet count, , , Once, Lincoln Dorsey DO    hydrALAZINE (APRESOLINE) injection 5 mg, 5 mg, Intravenous, Q6H PRN, Melissa BERNARD Ronnie Ladd MD    hydrALAZINE (APRESOLINE) tablet 50 mg, 50 mg, Oral, Q8H [facility], Deangelo Matt MD, 50 mg at 12/16/19 0516    insulin lispro (HumaLOG) 100 units/mL subcutaneous injection 1-5 Units, 1-5 Units, Subcutaneous, HS, Lulú Alessandra, DO    insulin lispro (HumaLOG) 100 units/mL subcutaneous injection 1-6 Units, 1-6 Units, Subcutaneous, TID AC, 1 Units at 12/15/19 1706 **AND** Fingerstick Glucose (POCT), , , 4x Daily AC and at bedtime, Frank Lerma MD    levothyroxine tablet 125 mcg, 125 mcg, Oral, Early Morning, Lulú Aparicio DO, 125 mcg at 12/16/19 0514    loperamide (IMODIUM) capsule 4 mg, 4 mg, Oral, TID PRN, Frank Lerma MD, 4 mg at 12/15/19 0744    LORazepam (ATIVAN) tablet 2 mg, 2 mg, Oral, TID PRN, Lulú Aparicio DO, 2 mg at 12/16/19 1100    metoprolol tartrate (LOPRESSOR) tablet 50 mg, 50 mg, Oral, Q12H [facility], Lulú Aparicio DO, 50 mg at 12/16/19 0914    multi-electrolyte (PLASMALYTE-A/ISOLYTE-S PH 7 4) IV solution, 50 mL/hr, Intravenous, Continuous, Seamus Fu DO, Last Rate: 50 mL/hr at 12/16/19 0916, 50 mL/hr at 12/16/19 0916    ondansetron (ZOFRAN) injection 4 mg, 4 mg, Intravenous, Q4H PRN, Jamal López MD, 4 mg at 12/16/19 1058    pravastatin (PRAVACHOL) tablet 80 mg, 80 mg, Oral, Daily, Lulú Aspclara, DO, 80 mg at 12/16/19 0911    predniSONE tablet 5 mg, 5 mg, Oral, Daily, Lulú Alessandra, DO, 5 mg at 12/16/19 0914    rOPINIRole (REQUIP) tablet 0 25 mg, 0 25 mg, Oral, BID, Lulú Aspen, DO, 0 25 mg at 12/16/19 0911    saccharomyces boulardii (FLORASTOR) capsule 250 mg, 250 mg, Oral, BID, Lulú Aspen, DO, 250 mg at 12/16/19 0911    sertraline (ZOLOFT) tablet 200 mg, 200 mg, Oral, Daily, Lulú Aspen, DO, 200 mg at 12/16/19 0912    sevelamer (RENAGEL) tablet 800 mg, 800 mg, Oral, TID With Meals, Lulú Aspen, DO, 800 mg at 12/16/19 0913    tacrolimus (PROGRAF) capsule 1 5 mg, 1 5 mg, Oral, Daily, Seamus Nickie DO, 1 5 mg at 12/15/19 2120   tacrolimus (PROGRAF) capsule 2 mg, 2 mg, Oral, Daily, Seamus Fu DO, 2 mg at 12/16/19 0915    Laboratory Results:  Lab Results   Component Value Date    WBC 6 86 12/16/2019    HGB 7 8 (L) 12/16/2019    HCT 25 4 (L) 12/16/2019     (H) 12/16/2019     12/16/2019     Lab Results   Component Value Date    SODIUM 144 12/16/2019    K 3 5 12/16/2019     (H) 12/16/2019    CO2 18 (L) 12/16/2019    BUN 36 (H) 12/16/2019    CREATININE 2 44 (H) 12/16/2019    GLUC 109 12/16/2019    CALCIUM 8 3 12/16/2019     Lab Results   Component Value Date    CALCIUM 8 3 12/16/2019    PHOS 4 7 (H) 12/04/2019     No results found for: LABPROT

## 2019-12-16 NOTE — OCCUPATIONAL THERAPY NOTE
Occupational Therapy Treatment Note:     12/16/19 1500   Restrictions/Precautions   Weight Bearing Precautions Per Order No   Braces or Orthoses   (orthotic shoes)   Other Precautions Contact/isolation;Cognitive; Fall Risk;Pain   Pain Assessment   Pain Score 3   Pain Type Acute pain   Pain Location Abdomen   Pain Orientation Mid   ADL   Where Assessed Commode   Toileting Assistance  4  Minimal Assistance   Toileting Deficit Perineal hygiene   Toileting Comments Assist for carryover of perihygiene education and throrughness of same   Functional Standing Tolerance   Time ~2 mins   Activity static stnading   Comments RW level   Bed Mobility   Additional Comments OOB upon presentation and at nd of session   Transfers   Sit to Stand 4  Minimal assistance   Additional items Assist x 1   Stand to Sit 4  Minimal assistance   Additional items Assist x 1   Stand pivot 4  Minimal assistance   Additional items Assist x 1   Toilet transfer 4  Minimal assistance   Additional items Assist x 1   Additional Comments transfer to and from [facility]   Overall Cognitive Status Impaired   Arousal/Participation Alert; Cooperative   Attention Attends with cues to redirect   Orientation Level Oriented X4   Memory Decreased recall of recent events;Decreased recall of precautions   Following Commands Follows one step commands without difficulty   Comments Agreeable to particiation in ACLS, see details below  Pt is easily frustrated with new learning and terminates tasks prematurley when difficulity increases  Cognition Assessment Tools ACLS   Score 4 2   Activity Tolerance   Activity Tolerance Patient limited by fatigue   Medical Staff Made Aware NSG aware   Assessment   Assessment Pt was seen this date for OT tx session focusing on self care tasks, sit to stand progressions, trafners, toileting, carryover of educaiton, particiaption in ACLS for appropraite d/c recommendations and overall activity toelrance   Pt presents seated OOB in chair, cmopletes previously mentioned tasks at doccumented assist levels above, see flow sheet  Pt agreeable and particiapted in ACLS, scored 4 2/6 0 where 24 hour supervision is recommended and pt should NOT drive  PLease see below for greater details on same  Pt is easily frustrated with difficult tasks dmeosntrated decreased problm solving and poor new learning skills  Pt continues to require min A for tranfer and mobility tasks  Pt lives alone, nearest support is her paretns who reportedly live about 15 mins away  Spoke with OTR/L about pt current level of function, agree that most approatie d/c recommendation would be STR at thtis time  Continue to follow with current POC to maximize overall functional abilites  Plan   Treatment Interventions ADL retraining   Goal Expiration Date 12/21/19   OT Treatment Day 2   OT Frequency 3-5x/wk   Recommendation   OT Discharge Recommendation Short Term Rehab  (and 24 hour supervision)     4 2    Administered Denise Huge Cognitive Level Screen (ACLS)  Pt scored 4 2/6 0 indicating 24 Hour supervision is recommended to remove dangerous objects outside the visual field and to solve problems due to minor changes in the environment  Behavior:  Recognizes errors  Identifies problems  Asks for Day/Date  Sequences one activity at a time  Processing speed is slow and may take extra time to complete tasks  Memorization will be very slow  Requires long term repetitive training for new tasks  Keep directions short and concise  Grooming:  Initiates and completes with supplies from familiar accessible locations  Stops and asks for help when an error is made  Expect cuts with use of straight razor  Check every few minutes if left alone  Allow ample time for completion of tasks  Dressing:  Selects clothing items based on striking features like color  May choose to wear a favorite item all the time  May argue with suggestions to change selections    Bathing:  Recognizes need for a bath and may ask if time is appropriate  Collects supplies from a visible location  Follows routine  May miss small hidden areas  Recognizes problem like lack of soap and asks for help  Remove hazards from proximity to bath (ie: electrical appliances)  Walking/exercising:  Ambulates to a familiar location ½ to 1 mile away  May not vary route  May fail to attend to activity or noises outside visual field  May ask for help if lost   May be able to learn a graded exercise program   May become anxious in high stimulus environments (malls, airports, casinos)  May resist going to certain places  Eating:  Initiates coming to table at routine times  Uses utensils  Recognizes errors and asks for help  Attempts to comply with social standards  May have trouble waiting for others  Assist with handling hot foods/liquids  Monitor compliance with special diet  Toileting:  Recognizes difficulties that interfere with toileting routine and asks for help  May be able to report change in bowel or bladder habits  May take much longer than average to complete toileting  Medications:  Initiates taking familiar dose at regular time of day  May not be able to open container and may have incorrect ideas of effects that lead to noncompliance  May not seek assist for problems encountered  Supervise to ensure compliance  Use of adaptive equipment:  Needs help with more complex equipment  May need assistance with fasteners that require fine motor skills  Does not understand the potential hazards of incorrect use  May forget to use equipment  Housekeeping:   Initiates and completes a routine of housekeeping activities  May be able to set priorities but may become fixated on a priority  Cleans, polishes and sweeps one feature at a time  Check for quality of cleaning results  Food preparation:  Does not plan for long term food needs  May go to store repeatedly whenever food is desired    May search cabinet for particular food item and ask for help when items are not found  May prepare a simple meal but may not recognize problems  Store all undesirable equipment away from view  Do not leave alone when using dangerous tools/equipment  Spending money:  May be able to set a priority for an expense that is highly valued  May become fixated on item  May resist help  Shopping:  May go to familiar shops but does not make a list or compare prices  Looks in familiar locations for an item  May ask for help when not found  May insist on purchasing item that is unrealistic or impractical   May resist help  May be anxious in high stimulus environments  Laundry:  May sort laundry by color or other simple striking cues  May view as a priority and initiate regularly  Recognizes errors like too much soap but cannot offer solutions  Traveling:  May go through actions slowly  Can sit unaccompanied up to 1 hour on a bus or train with landmarks as a guide when to get off  Asks for assistance if lost, does not alter familiar routes  May become anxious in high stimulus environments (bus, airport terminals)  Telephone:  Dials a new number written down by checking it 1 digit at a time  Writes down information very slowly  Does not consider a persons schedule or time of day when calling  Needs assistance to make calls from unfamiliar telephones  May call emergency or familiar numbers excessively  Driving:  Should NOT operate a motor vehicle           Shakir Bailey

## 2019-12-17 ENCOUNTER — HOSPITAL ENCOUNTER (OUTPATIENT)
Dept: NON INVASIVE DIAGNOSTICS | Facility: HOSPITAL | Age: 54
Discharge: HOME | End: 2019-12-17
Attending: INTERNAL MEDICINE

## 2019-12-17 DIAGNOSIS — I06.1 RHEUMATIC AORTIC VALVE INSUFFICIENCY: ICD-10-CM

## 2019-12-17 LAB
ANION GAP SERPL CALCULATED.3IONS-SCNC: 9 MMOL/L (ref 4–13)
BUN SERPL-MCNC: 32 MG/DL (ref 5–25)
CALCIUM SERPL-MCNC: 8.2 MG/DL (ref 8.3–10.1)
CHLORIDE SERPL-SCNC: 116 MMOL/L (ref 100–108)
CO2 SERPL-SCNC: 17 MMOL/L (ref 21–32)
CREAT SERPL-MCNC: 2.5 MG/DL (ref 0.6–1.3)
GFR SERPL CREATININE-BSD FRML MDRD: 21 ML/MIN/1.73SQ M
GLUCOSE SERPL-MCNC: 111 MG/DL (ref 65–140)
GLUCOSE SERPL-MCNC: 113 MG/DL (ref 65–140)
GLUCOSE SERPL-MCNC: 116 MG/DL (ref 65–140)
GLUCOSE SERPL-MCNC: 117 MG/DL (ref 65–140)
GLUCOSE SERPL-MCNC: 150 MG/DL (ref 65–140)
GLUCOSE SERPL-MCNC: 154 MG/DL (ref 65–140)
POTASSIUM SERPL-SCNC: 3.3 MMOL/L (ref 3.5–5.3)
SODIUM SERPL-SCNC: 142 MMOL/L (ref 136–145)

## 2019-12-17 PROCEDURE — 99232 SBSQ HOSP IP/OBS MODERATE 35: CPT | Performed by: INTERNAL MEDICINE

## 2019-12-17 PROCEDURE — 97116 GAIT TRAINING THERAPY: CPT

## 2019-12-17 PROCEDURE — 80048 BASIC METABOLIC PNL TOTAL CA: CPT | Performed by: HOSPITALIST

## 2019-12-17 PROCEDURE — 82948 REAGENT STRIP/BLOOD GLUCOSE: CPT

## 2019-12-17 PROCEDURE — 97110 THERAPEUTIC EXERCISES: CPT

## 2019-12-17 RX ORDER — AMOXICILLIN 250 MG
1 CAPSULE ORAL
Status: DISCONTINUED | OUTPATIENT
Start: 2019-12-17 | End: 2019-12-17

## 2019-12-17 RX ADMIN — PRAVASTATIN SODIUM 80 MG: 80 TABLET ORAL at 08:48

## 2019-12-17 RX ADMIN — HYDRALAZINE HYDROCHLORIDE 50 MG: 50 TABLET, FILM COATED ORAL at 13:40

## 2019-12-17 RX ADMIN — DULOXETINE HYDROCHLORIDE 60 MG: 60 CAPSULE, DELAYED RELEASE ORAL at 17:53

## 2019-12-17 RX ADMIN — METOPROLOL TARTRATE 50 MG: 50 TABLET, FILM COATED ORAL at 21:09

## 2019-12-17 RX ADMIN — HYDRALAZINE HYDROCHLORIDE 50 MG: 50 TABLET, FILM COATED ORAL at 21:09

## 2019-12-17 RX ADMIN — DOXAZOSIN 2 MG: 2 TABLET ORAL at 21:09

## 2019-12-17 RX ADMIN — PREDNISONE 5 MG: 5 TABLET ORAL at 08:49

## 2019-12-17 RX ADMIN — AMLODIPINE BESYLATE 10 MG: 10 TABLET ORAL at 08:48

## 2019-12-17 RX ADMIN — SODIUM BICARBONATE 650 MG TABLET 1300 MG: at 16:36

## 2019-12-17 RX ADMIN — TACROLIMUS 2 MG: 1 CAPSULE ORAL at 21:10

## 2019-12-17 RX ADMIN — BUSPIRONE HYDROCHLORIDE 5 MG: 5 TABLET ORAL at 08:49

## 2019-12-17 RX ADMIN — ROPINIROLE 0.25 MG: 0.25 TABLET, FILM COATED ORAL at 17:53

## 2019-12-17 RX ADMIN — DULOXETINE HYDROCHLORIDE 60 MG: 60 CAPSULE, DELAYED RELEASE ORAL at 08:49

## 2019-12-17 RX ADMIN — ASPIRIN 81 MG 81 MG: 81 TABLET ORAL at 08:49

## 2019-12-17 RX ADMIN — LEVOTHYROXINE SODIUM 125 MCG: 125 TABLET ORAL at 06:34

## 2019-12-17 RX ADMIN — CLONIDINE HYDROCHLORIDE 0.2 MG: 0.1 TABLET ORAL at 13:41

## 2019-12-17 RX ADMIN — HYDRALAZINE HYDROCHLORIDE 50 MG: 50 TABLET, FILM COATED ORAL at 06:34

## 2019-12-17 RX ADMIN — BUSPIRONE HYDROCHLORIDE 5 MG: 5 TABLET ORAL at 17:54

## 2019-12-17 RX ADMIN — HEPARIN SODIUM 5000 UNITS: 5000 INJECTION INTRAVENOUS; SUBCUTANEOUS at 06:34

## 2019-12-17 RX ADMIN — ONDANSETRON 4 MG: 2 INJECTION INTRAMUSCULAR; INTRAVENOUS at 20:32

## 2019-12-17 RX ADMIN — Medication 250 MG: at 17:53

## 2019-12-17 RX ADMIN — CLONIDINE HYDROCHLORIDE 0.2 MG: 0.1 TABLET ORAL at 21:09

## 2019-12-17 RX ADMIN — SEVELAMER HYDROCHLORIDE 800 MG: 800 TABLET, FILM COATED PARENTERAL at 16:36

## 2019-12-17 RX ADMIN — CLONIDINE HYDROCHLORIDE 0.2 MG: 0.1 TABLET ORAL at 06:34

## 2019-12-17 RX ADMIN — ROPINIROLE 0.25 MG: 0.25 TABLET, FILM COATED ORAL at 08:49

## 2019-12-17 RX ADMIN — HEPARIN SODIUM 5000 UNITS: 5000 INJECTION INTRAVENOUS; SUBCUTANEOUS at 13:39

## 2019-12-17 RX ADMIN — SEVELAMER HYDROCHLORIDE 800 MG: 800 TABLET, FILM COATED PARENTERAL at 08:49

## 2019-12-17 RX ADMIN — ONDANSETRON 4 MG: 2 INJECTION INTRAMUSCULAR; INTRAVENOUS at 15:52

## 2019-12-17 RX ADMIN — SODIUM BICARBONATE 650 MG TABLET 1300 MG: at 08:48

## 2019-12-17 RX ADMIN — LOPERAMIDE HYDROCHLORIDE 4 MG: 2 CAPSULE ORAL at 20:32

## 2019-12-17 RX ADMIN — MELATONIN 3000 UNITS: at 08:49

## 2019-12-17 RX ADMIN — METOPROLOL TARTRATE 50 MG: 50 TABLET, FILM COATED ORAL at 08:49

## 2019-12-17 RX ADMIN — SEVELAMER HYDROCHLORIDE 800 MG: 800 TABLET, FILM COATED PARENTERAL at 12:08

## 2019-12-17 RX ADMIN — Medication 250 MG: at 08:48

## 2019-12-17 RX ADMIN — HEPARIN SODIUM 5000 UNITS: 5000 INJECTION INTRAVENOUS; SUBCUTANEOUS at 21:09

## 2019-12-17 RX ADMIN — ARIPIPRAZOLE 30 MG: 10 TABLET ORAL at 21:09

## 2019-12-17 RX ADMIN — INSULIN LISPRO 1 UNITS: 100 INJECTION, SOLUTION INTRAVENOUS; SUBCUTANEOUS at 12:08

## 2019-12-17 RX ADMIN — SERTRALINE HYDROCHLORIDE 200 MG: 100 TABLET ORAL at 08:49

## 2019-12-17 RX ADMIN — FERROUS SULFATE TAB 325 MG (65 MG ELEMENTAL FE) 325 MG: 325 (65 FE) TAB at 08:49

## 2019-12-17 RX ADMIN — TACROLIMUS 2 MG: 1 CAPSULE ORAL at 08:51

## 2019-12-17 RX ADMIN — FOLIC ACID 1000 MCG: 1 TABLET ORAL at 08:49

## 2019-12-17 NOTE — CONSULTS
Consultation - Neuropsychology/Psychology Department  Da Fraser 53 y o  female MRN: [id number]  Unit/Bed#: -01 Encounter: [id number]        Reason for Consultation:  Da Fraser is a 53y o  year old female who was referred for a Neuropsychological Exam to assess cognitive functioning and comment on capacity to make informed medical decisions        History of Present Illness  Admitted due to generalized weakness and abdominal pain  Physician Requesting Consult: Khadijah Lagos MD    PROBLEM LIST:  Patient Active Problem List   Diagnosis    Renal transplant recipient    [facility]    Acquired hypothyroidism    Benign hypertension with CKD (chronic kidney disease) stage IV (HCC)    Enterocolitis    Hypokalemia    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 ([place] [address] )    Essential hypertension    Anemia    Status post kidney transplant    Immunosuppression ([place] [address] )    Weakness    Chronic diastolic congestive heart failure (HCC)    Urinary incontinence    Calculus, bladder, diverticulum    Hypercalcemia    MGUS (monoclonal gammopathy of unknown significance)    Chronic constipation    Acid reflux disease    [facility]    Kaiser esophagus    Cataract    Cocaine abuse in remission ([facility]    Gastric and duodenal disease    Diabetic nephropathy ([facility]    Diabetic polyneuropathy ([facility]    Retinopathy, diabetic, bilateral ([facility]    Diastolic dysfunction    Essential and other specified forms of tremor    Failure of pancreas transplant    FELIPE (generalized anxiety disorder)    Hyperlipidemia    Irritable bowel syndrome    Major depressive disorder, recurrent episode, in full remission ([facility] )    Aortic regurgitation    OAB (overactive bladder)    Osteoporosis    Peripheral vascular disease (HCC)    Post-traumatic stress disorder, chronic    Restless legs syndrome    Secondary hyperparathyroidism, renal ([facility] )  Anxiety    Neck strain    Neuropathy in diabetes [facility]    Periorbital cellulitis of left eye    History of herpes zoster    Subclavian artery stenosis, left (HCC)    Abnormal ECG    Impetigo    Multiple myeloma not having achieved remission (HCC)    Rash    Acute renal failure superimposed on stage 4 chronic kidney disease (HCC)    Skin rash    Metabolic acidosis    Ambulatory dysfunction    Acute renal failure (HCC)    Low bicarbonate    Urinary retention    Diarrhea    Asymptomatic bacteriuria    Bacteremia         Historical Information   Past Medical History:   Diagnosis Date    Abnormal liver function test     Acute kidney injury ([facility]     Acute on chronic congestive heart failure (HCC)     Allergic urticaria     Anemia     Cancer (HCC)     Multiple myeloma    Cervical dysplasia     Cholelithiasis     Chronic diastolic (congestive) heart failure ([facility] 9/18/2017    Diabetes mellitus (HCC)     Previous, controlled with diet    Diabetes mellitus with foot ulcer ([facility]     Disease of thyroid gland     Encephalopathy     Hematuria     + leak est- secondary to UTIs/panc drainage    History of transfusion     Hyperkalemia     Hypertension     Iliotibial band syndrome     Lumbar radiculopathy     Multiple myeloma (HCC)     Multiple myeloma (HCC)     Night blindness     Nonrheumatic aortic (valve) insufficiency     Pneumonia     Renal disorder     Retinopathy     Seborrhea     Seizure ([facility]     Shingles     Sinus tachycardia     B blocker - cardio echo stress test 02 normal/neg LE doppler 2/02 OK and 12/07    Status post simultaneous kidney and pancreas transplant ([facility]     Toe amputation status     Trochanteric bursitis      Past Surgical History:   Procedure Laterality Date    CATARACT EXTRACTION      CHOLECYSTECTOMY      COLONOSCOPY      two polyps in the rectum removed and biopsied diverticulosis in the sigmoid colon, external hemorrhoiods- Dr Carolyne Jameson Sang COMBINED KIDNEY-PANCREAS TRANSPLANT N/A     CT BONE MARROW BIOPSY AND ASPIRATION  2019    CYSTOSCOPY N/A 10/13/2016    Procedure: CYSTOSCOPY, retrograde pyelogram, biopsy of ureteral polyp; Surgeon: Kayleigh Geronimo MD;  Location: [facility];  Service:    Juliette Sanders DILATION AND CURETTAGE OF UTERUS      ESOPHAGOGASTRODUODENOSCOPY N/A 2017    Procedure: ESOPHAGOGASTRODUODENOSCOPY (EGD); Surgeon: Coni Thurman DO;  Location: [facility];   Service: Gastroenterology    EYE SURGERY      cataracts    FOOT AMPUTATION THROUGH METATARSAL Left     FOOT SURGERY Right     excision of metatarsal heads    HALLUX VALGUS CORRECTION Right     NEPHRECTOMY TRANSPLANTED ORGAN      PANCREATIC TRANSPLANT REMOVAL       Social History   Social History     Substance and Sexual Activity   Alcohol Use Never    Frequency: Never    Binge frequency: Never    Comment: (history)     Social History     Substance and Sexual Activity   Drug Use Never    Types: Hydrocodone    Comment: Past cocaine use many years ago - no current use     Social History     Tobacco Use   Smoking Status Former Smoker    Last attempt to quit: 2012    Years since quittin 6   Smokeless Tobacco Never Used     Family History:   Family History   Problem Relation Age of Onset    Hypertension Mother     Cancer Mother     Hypertension Father     Cancer Father     Cancer Maternal Grandfather     Cancer Paternal Grandmother     Cancer Paternal Grandfather     Depression Sister     Breast cancer Maternal Grandmother 77       Meds/Allergies   current meds:   Current Facility-Administered Medications   Medication Dose Route Frequency    acetaminophen (TYLENOL) tablet 650 mg  650 mg Oral Q6H PRN    aluminum-magnesium hydroxide-simethicone (MYLANTA) 200-200-20 mg/5 mL oral suspension 30 mL  30 mL Oral Q4H PRN    amLODIPine (NORVASC) tablet 10 mg  10 mg Oral Daily    ARIPiprazole (ABILIFY) tablet 30 mg  30 mg Oral HS    aspirin chewable tablet 81 mg  81 mg Oral Daily    busPIRone (BUSPAR) tablet 5 mg  5 mg Oral BID    cholecalciferol (VITAMIN D3) tablet 3,000 Units  3,000 Units Oral Daily    cloNIDine (CATAPRES) tablet 0 2 mg  0 2 mg Oral Q8H [facility]    dicyclomine (BENTYL) capsule 10 mg  10 mg Oral TID PRN    doxazosin (CARDURA) tablet 2 mg  2 mg Oral HS    DULoxetine (CYMBALTA) delayed release capsule 60 mg  60 mg Oral BID    ferrous sulfate tablet 325 mg  325 mg Oral Daily With Breakfast    folic acid (FOLVITE) tablet 1,000 mcg  1,000 mcg Oral Daily    heparin (porcine) subcutaneous injection 5,000 Units  5,000 Units Subcutaneous Q8H [facility]    hydrALAZINE (APRESOLINE) injection 5 mg  5 mg Intravenous Q6H PRN    hydrALAZINE (APRESOLINE) tablet 50 mg  50 mg Oral Q8H [facility]    insulin lispro (HumaLOG) 100 units/mL subcutaneous injection 1-5 Units  1-5 Units Subcutaneous HS    insulin lispro (HumaLOG) 100 units/mL subcutaneous injection 1-6 Units  1-6 Units Subcutaneous TID AC    levothyroxine tablet 125 mcg  125 mcg Oral Early Morning    loperamide (IMODIUM) capsule 4 mg  4 mg Oral TID PRN    LORazepam (ATIVAN) tablet 2 mg  2 mg Oral TID PRN    metoprolol tartrate (LOPRESSOR) tablet 50 mg  50 mg Oral Q12H [facility]    ondansetron (ZOFRAN) injection 4 mg  4 mg Intravenous Q4H PRN    pravastatin (PRAVACHOL) tablet 80 mg  80 mg Oral Daily    predniSONE tablet 5 mg  5 mg Oral Daily    rOPINIRole (REQUIP) tablet 0 25 mg  0 25 mg Oral BID    saccharomyces boulardii (FLORASTOR) capsule 250 mg  250 mg Oral BID    sertraline (ZOLOFT) tablet 200 mg  200 mg Oral Daily    sevelamer (RENAGEL) tablet 800 mg  800 mg Oral TID With Meals    sodium bicarbonate tablet 1,300 mg  1,300 mg Oral BID after meals    tacrolimus (PROGRAF) capsule 2 mg  2 mg Oral Daily    tacrolimus (PROGRAF) capsule 2 mg  2 mg Oral HS       Allergies   Allergen Reactions    Ixazomib Rash     Ninlaro    Revlimid [Lenalidomide] Rash    Cefadroxil      Tolerated cefepime 2019    Latex Rash    Morphine Other (See Comments)     Other reaction(s): Other (See Comments)  projectile vomiting    Morphine And Related GI Intolerance    Myrbetriq [Mirabegron] Hives    Penicillins Hives     Other reaction(s): Other (See Comments)  Respiratory Distress,hives  Tolerates cefazolin         Family and Social Support:   CM Handoff Comments: neuro psych eval       Behavioral Observations: Alert, oriented x 3, cooperative; affect pleasant and patient admitted to depressed mood, anxiety and denied suicidal ideation/plan/intent; admitted to being more forgetful; patient appeared to have an understanding of medical history and intends to participate in rehab in order to increase strength  Cognitive Examination    General Cognitive Functioning MMSE =Average 27/28; General Fund of Information = Borderline    Attention/Concentration Auditory Selective Attention = Average; Auditory Vigilance = Average; Information Processing Speed = Average    Frontal Systems/Executive Functioning Mental Flexibility/Cognitive Control = Average; Working Memory = Mildly Impaired Abstract Reasoning = Average; Generative Ability = Impaired, Commonsense Reasoning and Judgement = Low Average    Language Functioning Confrontation naming = Average, Phonemic Fluency = Impaired; Semantic Retrieval = Average; Comprehension of Complex Ideational Material = Average; Praxis = Within Normal Limits; Repetition = Within Normal Limits; Basic Reading = Within Normal Limits; Following Commands = Within Normal Limits    Memory Functioning Narrative Recall - Short Delay = Low Average; Long Delay Narrative Recall = Average; Visual Recognition = Average    Visuo-Spatial Abilities Not assessed    Functional Knowledge  Health & Safety Knowledge = Average;     Summary/Impression: Results of Neuropsychological Exam revealed cognitive deficits in working memory, and generative ability with all other areas of functioning as intact   On a measure assessing awareness of personal health status and ability to evaluate health problems, handle medical emergencies and take safety precautions, patient performed within normal limits  At this time, patient appears to have capacity to make informed medical decisions

## 2019-12-17 NOTE — SOCIAL WORK
IP rehab choice follow up:     CM met with the patient to discuss IP rehab choices  The patient stated that she and her father chose the following:     #1 [facility]  #2 [facility]  #3 [address]  #4 [facility]  #5 [facility]     ECINs sent to requested IP rehab facilities  CM will follow

## 2019-12-17 NOTE — ASSESSMENT & PLAN NOTE
· Significantly elevated on admission, suspect noncompliance and abdominal pain playing role in this  · Continue home antihypertensive regimen, blood pressure monitoring per protocol  · Was hypotensive, hence meds reduced by nephro norvasc stopped  · Now BPs high - give 5 mg norvasc now & 10 mg tomorrow

## 2019-12-17 NOTE — PROGRESS NOTES
Progress Note - Brien Willett 1965, 53 y o  female MRN: [id number]    Unit/Bed#: [facility] [place]-01 Encounter: [id number]    Primary Care Provider: Tera Monroe MD   Date and time admitted to hospital: 12/9/2019  8:29 PM        Bacteremia  Assessment & Plan  · 2/2 BC from admission growing anaerobes  · Was started on ceftriaxone and Flagyl by ID now ceftriaxone discontinued and continue Flagyl with plan of total 7 day course  · Source is suspected to be GI secondary to colitis  · Repeat BC from 12/14 neg so far  · Flagyl stopped today per ID d/t side effects & course completion  · Noticed some discrepancies in what pt tells & what nursing tells - some concerns also from OT eval today hence ordered neuropsych consult    Asymptomatic bacteriuria  Assessment & Plan  · Recently completed 7 D course of IV daptomycin for VRE UTI  · Has positive cultures for VRE again, but this is not the cause of her symptoms, its colonization  · Monitor off Abx  · ID on board    Metabolic acidosis  Assessment & Plan  · Appears acute on chronic  · S/P kidney and pancreatic transplant in 1998  · continue p o  and trend BMP  · Nephrology consultation    Multiple myeloma not having achieved remission ([facility] [address] )  Assessment & Plan  · Follows with Dr Freida Hooks as outpatient  · Continue Revlimid at home dosage  · Monitor hb, running low  · H/o transfusions    Essential hypertension  Assessment & Plan  · Significantly elevated on admission, suspect noncompliance and abdominal pain playing role in this  · Continue home antihypertensive regimen, blood pressure monitoring per protocol  · Was hypotensive, hence meds reduced by nephro, norvasc stopped  · Now BPs high - give 5 mg norvasc now & 10 mg tomorrow    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 [facility])  Assessment & Plan  Lab Results   Component Value Date    HGBA1C 5 1 09/09/2019       Recent Labs     12/15/19  2114 12/16/19  0628 12/16/19  1127 12/16/19  1613 Enterocolitis  Active Problems:    Renal transplant recipient    Chronic kidney disease, stage 4 (severe) (formerly [facility])    Hypokalemia    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 [address] )    Essential hypertension    Multiple myeloma not having achieved remission (formerly [facility])    Metabolic acidosis    Diarrhea    Asymptomatic bacteriuria    Bacteremia        VTE Pharmacologic Prophylaxis:   Pharmacologic: Heparin  Mechanical VTE Prophylaxis in Place: Yes    Patient Centered Rounds: I have performed bedside rounds with nursing staff today  Discussions with Specialists or Other Care Team Provider:     Education and Discussions with Family / Patient: patient    Time Spent for Care: 30 minutes  More than 50% of total time spent on counseling and coordination of care as described above  Current Length of Stay: 6 day(s)    Current Patient Status: Inpatient   Certification Statement: The patient will continue to require additional inpatient hospital stay due to monitor nausea, PO intake & dispo plan per OT    Discharge Plan / Estimated Discharge Date:     Code Status: Level 1 - Full Code      Subjective:   No diarrhea, + nausea & metallic taste    Objective:     Vitals:   Temp (24hrs), Av 2 °F (36 8 °C), Min:97 9 °F (36 6 °C), Max:98 6 °F (37 °C)    Temp:  [97 9 °F (36 6 °C)-98 6 °F (37 °C)] 98 6 °F (37 °C)  HR:  [67-71] 71  Resp:  [18-19] 19  BP: (159-190)/(61-70) 181/66  SpO2:  [94 %-95 %] 95 %  Body mass index is 37 34 kg/m²  Input and Output Summary (last 24 hours): Intake/Output Summary (Last 24 hours) at 2019 194  Last data filed at 2019 1756  Gross per 24 hour   Intake 2133 33 ml   Output 1950 ml   Net 183 33 ml       Physical Exam:     Physical Exam   Constitutional: She appears well-developed and well-nourished  HENT:   Head: Normocephalic and atraumatic  Eyes: Conjunctivae are normal    Cardiovascular: Normal rate and regular rhythm  Exam reveals no friction rub  No murmur heard  Pulmonary/Chest: Effort normal and breath sounds normal  No respiratory distress  Abdominal: Soft  She exhibits no distension  There is no tenderness  Neurological: She is alert  Vitals reviewed  Additional Data:     Labs:    Results from last 7 days   Lab Units 12/16/19  0522 12/15/19  0431   WBC Thousand/uL 6 86 7 02   HEMOGLOBIN g/dL 7 8* 7 3*   HEMATOCRIT % 25 4* 23 9*   PLATELETS Thousands/uL 166 154   NEUTROS PCT %  --  58   LYMPHS PCT %  --  27   LYMPHO PCT % 17  --    MONOS PCT %  --  8   MONO PCT % 3*  --    EOS PCT % 4 4     Results from last 7 days   Lab Units 12/16/19  0522 12/15/19  0431   POTASSIUM mmol/L 3 5 3 4*   CHLORIDE mmol/L 116* 115*   CO2 mmol/L 18* 19*   BUN mg/dL 36* 43*   CREATININE mg/dL 2 44* 2 63*   CALCIUM mg/dL 8 3 8 2*   ALK PHOS U/L  --  91   ALT U/L  --  18   AST U/L  --  11     Results from last 7 days   Lab Units 12/09/19 2023   INR  1 21*       * I Have Reviewed All Lab Data Listed Above  * Additional Pertinent Lab Tests Reviewed: All Labs Within Last 24 Hours Reviewed    Imaging:    Imaging Reports Reviewed Today Include:   Imaging Personally Reviewed by Myself Includes:      Recent Cultures (last 7 days):     Results from last 7 days   Lab Units 12/14/19  0823 12/10/19  1633 12/09/19 2028 12/09/19 2023 12/09/19 2018   BLOOD CULTURE  No Growth at 48 hrs    --  Gram-Negative Rods Anaerobic (Group)* Gram-Negative Rods Anaerobic (Group)*  --    GRAM STAIN RESULT   --   --  Gram negative rods* Gram negative rods*  --    URINE CULTURE   --   --   --   --  >100,000 cfu/ml Vancomycin Resistant Enterococcus faecium*  0122-7535 cfu/ml Gram-negative cintia- species*   C DIFF TOXIN B   --  Negative  --   --   --        Last 24 Hours Medication List:     Current Facility-Administered Medications:  acetaminophen 650 mg Oral Q6H PRN Kourtney Ortiz PA-C   aluminum-magnesium hydroxide-simethicone 30 mL Oral Q4H PRN Kourtney Ortiz PA-C   [START ON 12/17/2019] amLODIPine 10 mg Oral Daily Priti Davis MD   ARIPiprazole 30 mg Oral HS [facility], DO   aspirin 81 mg Oral Daily [facility], DO   busPIRone 5 mg Oral BID [facility], DO   cholecalciferol 3,000 Units Oral Daily [facility], DO   cloNIDine 0 2 mg Oral Q8H [address] Everett Xavier MD   dicyclomine 10 mg Oral TID PRN Priti Davis MD   doxazosin 2 mg Oral HS Everett Xavier MD   DULoxetine 60 mg Oral BID [facility], DO   ferrous sulfate 325 mg Oral Daily With Breakfast [facility], DO   folic acid 0,652 mcg Oral Daily [facility], DO   heparin (porcine) 5,000 Units Subcutaneous [facility], DO   hydrALAZINE 5 mg Intravenous Q6H PRN Priti Davis MD   hydrALAZINE 50 mg Oral Q8H [address] Everett Xavier MD   insulin lispro 1-5 Units Subcutaneous HS [facility], DO   insulin lispro 1-6 Units Subcutaneous TID AC Priti Davis MD   levothyroxine 125 mcg Oral Early Morning [facility], DO   loperamide 4 mg Oral TID PRN Priti Davis MD   LORazepam 2 mg Oral TID PRN [facility], DO   metoprolol tartrate 50 mg Oral Q12H [address] [facility], DO   ondansetron 4 mg Intravenous Q4H PRN Barry Francois MD   pravastatin 80 mg Oral Daily [facility], DO   predniSONE 5 mg Oral Daily [facility], DO   rOPINIRole 0 25 mg Oral BID [facility], DO   saccharomyces boulardii 250 mg Oral BID [facility], DO   sertraline 200 mg Oral Daily [facility], DO   sevelamer 800 mg Oral TID With Meals [facility], DO   sodium bicarbonate 1,300 mg Oral BID after meals Yamil Berg MD   tacrolimus 2 mg Oral Daily Seamus Fu, DO   tacrolimus 2 mg Oral HS Yamil Berg MD        Today, Patient Was Seen By: Priti Davis MD    ** Please Note: This note has been constructed using a voice recognition system   **

## 2019-12-17 NOTE — ASSESSMENT & PLAN NOTE
· 2/2 BC from admission growing anaerobes  · Was started on ceftriaxone and Flagyl by ID now ceftriaxone discontinued and continue Flagyl with plan of total 7 day course  · Source is suspected to be GI secondary to colitis  · Repeat BC from 12/14 neg so far  · Flagyl stopped today per ID d/t side effects & course completion  · Noticed some discrepancies in what pt tells & what nursing tells - some concerns also from OT eval today hence ordered neuropsych consult

## 2019-12-17 NOTE — ASSESSMENT & PLAN NOTE
· Creatinine worsened to 2 7 now better to 2 6 - monitor PVR - neg, tac levels  · ?  ATN  · Now better, stop IVF  · Monitor volume status, not all UO documented  · Monitor BMP while inpatient  · Avoid nephrotoxins as able, renally dose medications as indicated

## 2019-12-17 NOTE — ASSESSMENT & PLAN NOTE
· With history of recurrent UTIs in setting of renal and pancreatic transplant    Most recently with VRE at recent admission to [facility]  · Presenting now with abdominal pain/nausea and lethargy/confusion  · CT abdomen/pelvis showing colitis  · With positive BC with anaerobe, started on rocephin & flagyl per ID, deescalated to only Flagyl  · Stool cdiff neg, enteric panel neg, leucocytes neg  · procalcitonin 0 3  · Trend WBC, temperature curve  · Checked CMV PCR - negative  · Improved  · Side effects from flagyl hence stopped by ID today

## 2019-12-17 NOTE — PLAN OF CARE
Problem: Potential for Falls  Goal: Patient will remain free of falls  Description  INTERVENTIONS:  - Assess patient frequently for physical needs  -  Identify cognitive and physical deficits and behaviors that affect risk of falls  -  [place] fall precautions as indicated by assessment   - Educate patient/family on patient safety including physical limitations  - Instruct patient to call for assistance with activity based on assessment  - Modify environment to reduce risk of injury  - Consider OT/PT consult to assist with strengthening/mobility  Outcome: Progressing     Problem: Prexisting or High Potential for Compromised Skin Integrity  Goal: Skin integrity is maintained or improved  Description  INTERVENTIONS:  - Identify patients at risk for skin breakdown  - Assess and monitor skin integrity  - Assess and monitor nutrition and hydration status  - Monitor labs   - Assess for incontinence   - Turn and reposition patient  - Assist with mobility/ambulation  - Relieve pressure over bony prominences  - Avoid friction and shearing  - Provide appropriate hygiene as needed including keeping skin clean and dry  - Evaluate need for skin moisturizer/barrier cream  - Collaborate with interdisciplinary team   - Patient/family teaching  - Consider wound care consult   Outcome: Progressing     Problem: Nutrition/Hydration-ADULT  Goal: Nutrient/Hydration intake appropriate for improving, restoring or maintaining nutritional needs  Description  Monitor and assess patient's nutrition/hydration status for malnutrition  Collaborate with interdisciplinary team and initiate plan and interventions as ordered  Monitor patient's weight and dietary intake as ordered or per policy  Utilize nutrition screening tool and intervene as necessary  Determine patient's food preferences and provide high-protein, high-caloric foods as appropriate       INTERVENTIONS:  - Monitor oral intake, urinary output, labs, and treatment plans  - Assess nutrition and hydration status and recommend course of action  - Evaluate amount of meals eaten  - Assist patient with eating if necessary   - Allow adequate time for meals  - Recommend/ encourage appropriate diets, oral nutritional supplements, and vitamin/mineral supplements  - Order, calculate, and assess calorie counts as needed  - Recommend, monitor, and adjust tube feedings and TPN/PPN based on assessed needs  - Assess need for intravenous fluids  - Provide specific nutrition/hydration education as appropriate  - Include patient/family/caregiver in decisions related to nutrition  Outcome: Progressing     Problem: PAIN - ADULT  Goal: Verbalizes/displays adequate comfort level or baseline comfort level  Description  Interventions:  - Encourage patient to monitor pain and request assistance  - Assess pain using appropriate pain scale  - Administer analgesics based on type and severity of pain and evaluate response  - Implement non-pharmacological measures as appropriate and evaluate response  - Consider cultural and social influences on pain and pain management  - Notify physician/advanced practitioner if interventions unsuccessful or patient reports new pain  Outcome: Progressing     Problem: INFECTION - ADULT  Goal: Absence or prevention of progression during hospitalization  Description  INTERVENTIONS:  - Assess and monitor for signs and symptoms of infection  - Monitor lab/diagnostic results  - Monitor all insertion sites, i e  indwelling lines, tubes, and drains  - Monitor endotracheal if appropriate and nasal secretions for changes in amount and color  - [place] appropriate cooling/warming therapies per order  - Administer medications as ordered  - Instruct and encourage patient and family to use good hand hygiene technique  - Identify and instruct in appropriate isolation precautions for identified infection/condition  Outcome: Progressing  Goal: Absence of fever/infection during neutropenic period  Description  INTERVENTIONS:  - Monitor WBC    Outcome: Progressing     Problem: SAFETY ADULT  Goal: Maintain or return to baseline ADL function  Description  INTERVENTIONS:  -  Assess patient's ability to carry out ADLs; assess patient's baseline for ADL function and identify physical deficits which impact ability to perform ADLs (bathing, care of mouth/teeth, toileting, grooming, dressing, etc )  - Assess/evaluate cause of self-care deficits   - Assess range of motion  - Assess patient's mobility; develop plan if impaired  - Assess patient's need for assistive devices and provide as appropriate  - Encourage maximum independence but intervene and supervise when necessary  - Involve family in performance of ADLs  - Assess for home care needs following discharge   - Consider OT consult to assist with ADL evaluation and planning for discharge  - Provide patient education as appropriate  Outcome: Progressing  Goal: Maintain or return mobility status to optimal level  Description  INTERVENTIONS:  - Assess patient's baseline mobility status (ambulation, transfers, stairs, etc )    - Identify cognitive and physical deficits and behaviors that affect mobility  - Identify mobility aids required to assist with transfers and/or ambulation (gait belt, sit-to-stand, lift, walker, cane, etc )  - [place] fall precautions as indicated by assessment  - Record patient progress and toleration of activity level on Mobility SBAR; progress patient to next Phase/Stage  - Instruct patient to call for assistance with activity based on assessment  - Consider rehabilitation consult to assist with strengthening/weightbearing, etc   Outcome: Progressing     Problem: DISCHARGE PLANNING  Goal: Discharge to home or other facility with appropriate resources  Description  INTERVENTIONS:  - Identify barriers to discharge w/patient and caregiver  - Arrange for needed discharge resources and transportation as appropriate  - Identify discharge learning needs (meds, wound care, etc )  - Arrange for interpretive services to assist at discharge as needed  - Refer to Case Management Department for coordinating discharge planning if the patient needs post-hospital services based on physician/advanced practitioner order or complex needs related to functional status, cognitive ability, or social support system  Outcome: Progressing     Problem: Knowledge Deficit  Goal: Patient/family/caregiver demonstrates understanding of disease process, treatment plan, medications, and discharge instructions  Description  Complete learning assessment and assess knowledge base    Interventions:  - Provide teaching at level of understanding  - Provide teaching via preferred learning methods  Outcome: Progressing

## 2019-12-17 NOTE — PROGRESS NOTES
NEPHROLOGY PROGRESS NOTE    Lance Gill 53 y o  female MRN: [id number]  Unit/Bed#: -01 Encounter: [id number]  Reason for Consult:  Chronic renal insufficiency in renal transplant    Patient is awake and alert she ate breakfast and states that 1 day makes a big difference as she is off Metronidazole feels no more nausea and she is eating well  She reports no diarrhea and actually has not had a BM in a couple days  No fever other events overnight  ASSESSMENT/PLAN:  1  Renal    Patient has chronic kidney disease and renal allograft with baseline creatinine of around 2 5  Her immunosuppression consists of tacrolimus 2 mg every 12 hours and prednisone 5 mg a day  She will continue with these current medications last trough level was 5 which is in the therapeutic range  She also has chronic metabolic acidosis 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 which drains exocrine secretions into the bladder  I re-initiated her oral bicarbonate therapy and will follow  Continue current dose of immunosuppressive so    2  Bacteremia    Infectious Disease is following patient gram-negative bacteremia was treated with metronidazole  Is afebrile and relatively stable  Blood pressure stable as well  3  Frequent urinary tract infections  4  History of multiple  SUBJECTIVE:  Review of Systems   Constitution: Negative for chills, decreased appetite, fever and malaise/fatigue  HENT: Negative  Eyes: Negative  Cardiovascular: Negative  Negative for chest pain, dyspnea on exertion, leg swelling, orthopnea and palpitations  Respiratory: Negative  Negative for cough, shortness of breath, sputum production and wheezing  Gastrointestinal: Negative  Negative for bloating, abdominal pain, diarrhea, nausea and vomiting  Genitourinary: Negative for bladder incontinence, dysuria, hematuria and incomplete emptying  Psychiatric/Behavioral: Negative    Negative for altered mental status  OBJECTIVE:  Current Weight: Weight - Scale: 108 kg (238 lb 12 8 oz)  Vitals:Temp (24hrs), Av 3 °F (36 8 °C), Min:98 2 °F (36 8 °C), Max:98 6 °F (37 °C)  Current: Temperature: 98 2 °F (36 8 °C)   Blood pressure 147/57, pulse 64, temperature 98 2 °F (36 8 °C), resp  rate 18, height 5' 8" (1 727 m), weight 108 kg (238 lb 12 8 oz), SpO2 95 %  , Body mass index is 36 31 kg/m²  Intake/Output Summary (Last 24 hours) at 2019 0808  Last data filed at 2019  Gross per 24 hour   Intake 540 ml   Output 1375 ml   Net -835 ml       Physical Exam: /57   Pulse 64   Temp 98 2 °F (36 8 °C)   Resp 18   Ht 5' 8" (1 727 m)   Wt 108 kg (238 lb 12 8 oz)   LMP  (LMP Unknown)   SpO2 95%   BMI 36 31 kg/m²   Physical Exam   Constitutional: She is oriented to person, place, and time  She appears well-nourished  Non-toxic appearance  She does not appear ill  No distress  HENT:   Head: Atraumatic  Mouth/Throat: Oropharynx is clear and moist    Eyes: EOM are normal  No scleral icterus  Cardiovascular: Normal rate and regular rhythm  Exam reveals no gallop and no friction rub  Pulmonary/Chest: Effort normal and breath sounds normal  No respiratory distress  She has no wheezes  She has no rhonchi  She has no rales  Abdominal: Soft  Normal appearance and bowel sounds are normal  There is no tenderness  Neurological: She is alert and oriented to person, place, and time  Psychiatric: She has a normal mood and affect         Medications:    Current Facility-Administered Medications:     acetaminophen (TYLENOL) tablet 650 mg, 650 mg, Oral, Q6H PRN, JODI Hi-CELIA, 650 mg at 19 1059    aluminum-magnesium hydroxide-simethicone (MYLANTA) 200-200-20 mg/5 mL oral suspension 30 mL, 30 mL, Oral, Q4H PRN, Kourtney Ortiz PA-C, 30 mL at 19 0148    amLODIPine (NORVASC) tablet 10 mg, 10 mg, Oral, Daily, Melissa B Dumaswala, MD    ARIPiprazole (ABILIFY) tablet 30 mg, 30 mg, Oral, HS, Lemon Hopping, DO, 30 mg at 12/16/19 2200    aspirin chewable tablet 81 mg, 81 mg, Oral, Daily, Lemon Hopping, DO, 81 mg at 12/16/19 0912    busPIRone (BUSPAR) tablet 5 mg, 5 mg, Oral, BID, Lemon Hopping, DO, 5 mg at 12/16/19 1731    cholecalciferol (VITAMIN D3) tablet 3,000 Units, 3,000 Units, Oral, Daily, Lemon Hopping, DO, 3,000 Units at 12/16/19 0913    cloNIDine (CATAPRES) tablet 0 2 mg, 0 2 mg, Oral, Q8H [address], Chantal Guardado MD, 0 2 mg at 12/17/19 3784    dicyclomine (BENTYL) capsule 10 mg, 10 mg, Oral, TID PRN, Lovell Phoenix, MD, 10 mg at 12/16/19 1547    doxazosin (CARDURA) tablet 2 mg, 2 mg, Oral, HS, Chantal Guardado MD, 2 mg at 12/16/19 2200    DULoxetine (CYMBALTA) delayed release capsule 60 mg, 60 mg, Oral, BID, Lemon Hopping, DO, 60 mg at 12/16/19 1731    ferrous sulfate tablet 325 mg, 325 mg, Oral, Daily With Breakfast, Lemon Hopping, DO, 325 mg at 90/19/54 7872    folic acid (FOLVITE) tablet 1,000 mcg, 1,000 mcg, Oral, Daily, Lemon Hopping, DO, 1,000 mcg at 12/16/19 0911    heparin (porcine) subcutaneous injection 5,000 Units, 5,000 Units, Subcutaneous, Q8H [address], 5,000 Units at 12/17/19 0634 **AND** [CANCELED] Platelet count, , , Once, Lemon Hopping, DO    hydrALAZINE (APRESOLINE) injection 5 mg, 5 mg, Intravenous, Q6H PRN, Lovell Phoenix, MD, 5 mg at 12/16/19 1733    hydrALAZINE (APRESOLINE) tablet 50 mg, 50 mg, Oral, Q8H [address], Chantal Guardado MD, 50 mg at 12/17/19 0634    insulin lispro (HumaLOG) 100 units/mL subcutaneous injection 1-5 Units, 1-5 Units, Subcutaneous, HS, Lemon Hopping, DO    insulin lispro (HumaLOG) 100 units/mL subcutaneous injection 1-6 Units, 1-6 Units, Subcutaneous, TID AC, 1 Units at 12/15/19 1706 **AND** Fingerstick Glucose (POCT), , , 4x Daily AC and at bedtime, Lovell Phoenix, MD    levothyroxine tablet 125 mcg, 125 mcg, Oral, Early Morning, Lemon Hopping, DO, 125 mcg at 12/17/19 0634    loperamide (IMODIUM) capsule 4 mg, 4 mg, Oral, TID PRN, Harjit Garcia MD, 4 mg at 12/15/19 0744    LORazepam (ATIVAN) tablet 2 mg, 2 mg, Oral, TID PRN, Dicie Betty, DO, 2 mg at 12/16/19 1100    metoprolol tartrate (LOPRESSOR) tablet 50 mg, 50 mg, Oral, Q12H [facility], Dicie Gillett, DO, 50 mg at 12/16/19 2051    ondansetron [facility]) injection 4 mg, 4 mg, Intravenous, Q4H PRN, Gwendolyn Sexton MD, 4 mg at 12/16/19 2051    pravastatin (PRAVACHOL) tablet 80 mg, 80 mg, Oral, Daily, Dicie Betty, DO, 80 mg at 12/16/19 0911    predniSONE tablet 5 mg, 5 mg, Oral, Daily, Dicerik Hernández, DO, 5 mg at 12/16/19 0914    rOPINIRole (REQUIP) tablet 0 25 mg, 0 25 mg, Oral, BID, Dicie Gillett, DO, 0 25 mg at 12/16/19 1731    saccharomyces boulardii (FLORASTOR) capsule 250 mg, 250 mg, Oral, BID, Dicie Betty, DO, 250 mg at 12/16/19 1731    sertraline (ZOLOFT) tablet 200 mg, 200 mg, Oral, Daily, Dicie Betty, DO, 200 mg at 12/16/19 0912    sevelamer (RENAGEL) tablet 800 mg, 800 mg, Oral, TID With Meals, Nishant Hernández, DO, 800 mg at 12/16/19 1547    sodium bicarbonate tablet 1,300 mg, 1,300 mg, Oral, BID after meals, Merly Young MD, 1,300 mg at 12/16/19 1742    tacrolimus (PROGRAF) capsule 2 mg, 2 mg, Oral, Daily, Seamus Fu DO, 2 mg at 12/16/19 0915    tacrolimus (PROGRAF) capsule 2 mg, 2 mg, Oral, HS, Mrely Young MD, 2 mg at 12/16/19 2051    Laboratory Results:  Lab Results   Component Value Date    WBC 6 86 12/16/2019    HGB 7 8 (L) 12/16/2019    HCT 25 4 (L) 12/16/2019     (H) 12/16/2019     12/16/2019     Lab Results   Component Value Date    SODIUM 144 12/16/2019    K 3 5 12/16/2019     (H) 12/16/2019    CO2 18 (L) 12/16/2019    BUN 36 (H) 12/16/2019    CREATININE 2 44 (H) 12/16/2019    GLUC 109 12/16/2019    CALCIUM 8 3 12/16/2019     Lab Results   Component Value Date    CALCIUM 8 3 12/16/2019    PHOS 4 7 (H) 12/04/2019     No results found for: LABPROT

## 2019-12-17 NOTE — PHYSICAL THERAPY NOTE
PHYSICAL THERAPY TREATMENT NOTE          Patient Name: Jo ALFONSO Date: 12/17/2019 12/17/19 1258   Pain Assessment   Pain Assessment No/denies pain   Pain Score No Pain   Restrictions/Precautions   Weight Bearing Precautions Per Order No   Braces or Orthoses   (none)   Other Precautions Contact/isolation; Fall Risk;Multiple lines   General   Chart Reviewed Yes   Response to Previous Treatment Patient with no complaints from previous session  Family/Caregiver Present No   Cognition   Arousal/Participation Alert   Attention Within functional limits   Orientation Level Oriented X4   Memory Decreased recall of precautions   Following Commands Follows multistep commands with increased time or repetition   Subjective   Subjective "I feel a lot better than I did yesterday"   Bed Mobility   Additional Comments OOB in chair upon PT arrival   Pt left upright inbedside chair with all neds in reach at end of therapy session  Transfers   Sit to Stand 4  Minimal assistance  (CGA)   Additional items Assist x 1   Stand to Sit 5  Supervision   Additional Comments Transfers with RW  Ambulation/Elevation   Gait pattern Excessively slow; Short stride   Gait Assistance 4  Minimal assist  (CGA)   Additional items Assist x 1   Assistive Device Rolling walker   Distance 55 ft x 2   Stair Management Assistance 4  Minimal assist   Additional items Assist x 1;Verbal cues   Stair Management Technique Two rails; Foreward; Step to pattern   Number of Stairs 3   Balance   Static Sitting Fair +   Dynamic Sitting Fair   Static Standing Fair -   Dynamic Standing Fair -   Ambulatory Poor +   Endurance Deficit   Endurance Deficit Yes   Endurance Deficit Description fatigue, weakness, SOB   Activity Tolerance   Activity Tolerance Patient limited by fatigue   Nurse Made Aware RN cleared pt to be seen by PT   Exercises   Hip Flexion Sitting;15 reps;Bilateral  (x2)   Knee AROM Long Arc Quad Sitting;15 reps;Bilateral  (x2)   Ankle Pumps Sitting;15 reps;Bilateral  (x2)   Assessment   Prognosis Good   Problem List Decreased strength;Decreased endurance; Impaired balance;Decreased mobility;Pain   Assessment Pt seen for PT treatment session  Pt pleasant and agreeable to participate  Pt making steady progress toward goals  Pt demonstrated ability to ambulate increased distance 55 ft x 2 with RW with CGA and negotiate 3 steps with Min A  Pt limited by SOB and fatigue  Pt performed LE therex as outlined above  Pt left upright in bedside chair with all needs in reach  Pt will benefit from skilled therapy in order to address current impairments and functional limitations  PT to follow pt and recommending home with family support and OPPT once medically cleared  Goals   Patient Goals to go home   STG Expiration Date 12/25/19   PT Treatment Day 2   Plan   Treatment/Interventions Functional transfer training;LE strengthening/ROM; Therapeutic exercise;Elevations; Endurance training;Patient/family training;Equipment eval/education; Bed mobility;Gait training;Spoke to nursing   Progress Progressing toward goals   PT Frequency Other (Comment)  (3-5x/wk)   Recommendation   Recommendation Home with family support; Outpatient PT   Equipment Recommended Walker   PT - OK to Discharge Yes  (once medically cleared)     Jacky Vigil, PT, DPT

## 2019-12-17 NOTE — RESTORATIVE TECHNICIAN NOTE
Restorative Specialist Mobility Note       Activity: Ambulate in barron, Ambulate in room, Bathroom privileges, Chair, Dangle, Stand at bedside(Educated/encouraged pt to ambulate with assistance 3-4 x's/day  Chair alarm on   Pt callbell, phone/tray within reach )     Assistive Device: Front wheel walker, Other (Comment)(B/L shoes on )       Apurva STONE, Restorative Technician, [employer]

## 2019-12-17 NOTE — ASSESSMENT & PLAN NOTE
Lab Results   Component Value Date    HGBA1C 5 1 09/09/2019       Recent Labs     12/15/19  2114 12/16/19  0628 12/16/19  1127 12/16/19  1613   POCGLU 104 114 120 113       Blood Sugar Average: Last 72 hrs:  · (P) 067 [id number] stopped Lantus (in place of Toujeo) 5 units qHS  · Cont SSI with accu-cheks and carb controlled diet  · Initiate hypoglycemia protocol and avoid hypoglycemia

## 2019-12-17 NOTE — ASSESSMENT & PLAN NOTE
· Follows with Dr Rosita Soto as outpatient  · Continue Revlimid at home dosage  · Monitor hb, running low  · H/o transfusions

## 2019-12-17 NOTE — ASSESSMENT & PLAN NOTE
· On chronic immunosuppression with tacrolimus and prednisone  · Unfortunately patient noncompliant with all medications since discharge including these  · Tacrolimus level was 4 > 10 > 7 (12/12) > 5 (12/14)  · dose decreased to 2 BID on 12/12, again decrease to 2 mg QAM & 1 5 mg HS from 12/14  · Repeat levels on Tues  · Per nephro today - level of 5 was appropriate so increase tac back to 2 mg BID at home dose

## 2019-12-17 NOTE — PLAN OF CARE
Problem: PHYSICAL THERAPY ADULT  Goal: Performs mobility at highest level of function for planned discharge setting  See evaluation for individualized goals  Description  Treatment/Interventions: Functional transfer training, LE strengthening/ROM, Therapeutic exercise, Endurance training, Elevations, Patient/family training, Equipment eval/education, Bed mobility, Gait training, Spoke to nursing  Equipment Recommended: Ruben Emerson       See flowsheet documentation for full assessment, interventions and recommendations  Outcome: Progressing  Note:   Prognosis: Good  Problem List: Decreased strength, Decreased endurance, Impaired balance, Decreased mobility, Pain  Assessment: Pt seen for PT treatment session  Pt pleasant and agreeable to participate  Pt making steady progress toward goals  Pt demonstrated ability to ambulate increased distance 55 ft x 2 with RW with CGA and negotiate 3 steps with Min A  Pt limited by SOB and fatigue  Pt performed LE therex as outlined above  Pt left upright in bedside chair with all needs in reach  Pt will benefit from skilled therapy in order to address current impairments and functional limitations  PT to follow pt and recommending home with family support and OPPT once medically cleared  Recommendation: Home with family support, Outpatient PT     PT - OK to Discharge: Yes(once medically cleared)    See flowsheet documentation for full assessment

## 2019-12-18 ENCOUNTER — APPOINTMENT (INPATIENT)
Dept: RADIOLOGY | Facility: HOSPITAL | Age: 54
DRG: 391 | End: 2019-12-18
Attending: INTERNAL MEDICINE
Payer: MEDICARE

## 2019-12-18 LAB
ABO GROUP BLD: NORMAL
ANION GAP SERPL CALCULATED.3IONS-SCNC: 9 MMOL/L (ref 4–13)
BACTERIA BLD CULT: ABNORMAL
BACTERIA BLD CULT: ABNORMAL
BLD GP AB SCN SERPL QL: POSITIVE
BUN SERPL-MCNC: 32 MG/DL (ref 5–25)
CALCIUM SERPL-MCNC: 8.4 MG/DL (ref 8.3–10.1)
CHLORIDE SERPL-SCNC: 117 MMOL/L (ref 100–108)
CO2 SERPL-SCNC: 16 MMOL/L (ref 21–32)
CREAT SERPL-MCNC: 2.55 MG/DL (ref 0.6–1.3)
ERYTHROCYTE [DISTWIDTH] IN BLOOD BY AUTOMATED COUNT: 16.4 % (ref 11.6–15.1)
GFR SERPL CREATININE-BSD FRML MDRD: 21 ML/MIN/1.73SQ M
GLUCOSE SERPL-MCNC: 109 MG/DL (ref 65–140)
GLUCOSE SERPL-MCNC: 113 MG/DL (ref 65–140)
GLUCOSE SERPL-MCNC: 120 MG/DL (ref 65–140)
GLUCOSE SERPL-MCNC: 124 MG/DL (ref 65–140)
GLUCOSE SERPL-MCNC: 147 MG/DL (ref 65–140)
GRAM STN SPEC: ABNORMAL
GRAM STN SPEC: ABNORMAL
HCT VFR BLD AUTO: 24.9 % (ref 34.8–46.1)
HGB BLD-MCNC: 7.6 G/DL (ref 11.5–15.4)
LACTATE SERPL-SCNC: 0.4 MMOL/L (ref 0.5–2)
MCH RBC QN AUTO: 31.1 PG (ref 26.8–34.3)
MCHC RBC AUTO-ENTMCNC: 30.5 G/DL (ref 31.4–37.4)
MCV RBC AUTO: 102 FL (ref 82–98)
PLATELET # BLD AUTO: 163 THOUSANDS/UL (ref 149–390)
PMV BLD AUTO: 12.2 FL (ref 8.9–12.7)
POTASSIUM SERPL-SCNC: 3.3 MMOL/L (ref 3.5–5.3)
RBC # BLD AUTO: 2.44 MILLION/UL (ref 3.81–5.12)
RH BLD: POSITIVE
SODIUM SERPL-SCNC: 142 MMOL/L (ref 136–145)
SPECIMEN EXPIRATION DATE: NORMAL
WBC # BLD AUTO: 6.12 THOUSAND/UL (ref 4.31–10.16)

## 2019-12-18 PROCEDURE — 82948 REAGENT STRIP/BLOOD GLUCOSE: CPT

## 2019-12-18 PROCEDURE — 99233 SBSQ HOSP IP/OBS HIGH 50: CPT | Performed by: INTERNAL MEDICINE

## 2019-12-18 PROCEDURE — 80197 ASSAY OF TACROLIMUS: CPT | Performed by: INTERNAL MEDICINE

## 2019-12-18 PROCEDURE — 85027 COMPLETE CBC AUTOMATED: CPT | Performed by: INTERNAL MEDICINE

## 2019-12-18 PROCEDURE — 87493 C DIFF AMPLIFIED PROBE: CPT | Performed by: INTERNAL MEDICINE

## 2019-12-18 PROCEDURE — 74022 RADEX COMPL AQT ABD SERIES: CPT

## 2019-12-18 PROCEDURE — 99232 SBSQ HOSP IP/OBS MODERATE 35: CPT | Performed by: INTERNAL MEDICINE

## 2019-12-18 PROCEDURE — 86900 BLOOD TYPING SEROLOGIC ABO: CPT | Performed by: INTERNAL MEDICINE

## 2019-12-18 PROCEDURE — 80048 BASIC METABOLIC PNL TOTAL CA: CPT | Performed by: INTERNAL MEDICINE

## 2019-12-18 PROCEDURE — 86850 RBC ANTIBODY SCREEN: CPT | Performed by: INTERNAL MEDICINE

## 2019-12-18 PROCEDURE — 83605 ASSAY OF LACTIC ACID: CPT | Performed by: INTERNAL MEDICINE

## 2019-12-18 PROCEDURE — 86901 BLOOD TYPING SEROLOGIC RH(D): CPT | Performed by: INTERNAL MEDICINE

## 2019-12-18 RX ORDER — POTASSIUM CHLORIDE 20 MEQ/1
20 TABLET, EXTENDED RELEASE ORAL ONCE
Status: COMPLETED | OUTPATIENT
Start: 2019-12-18 | End: 2019-12-18

## 2019-12-18 RX ADMIN — HEPARIN SODIUM 5000 UNITS: 5000 INJECTION INTRAVENOUS; SUBCUTANEOUS at 22:00

## 2019-12-18 RX ADMIN — BUSPIRONE HYDROCHLORIDE 5 MG: 5 TABLET ORAL at 09:39

## 2019-12-18 RX ADMIN — BUSPIRONE HYDROCHLORIDE 5 MG: 5 TABLET ORAL at 17:00

## 2019-12-18 RX ADMIN — SEVELAMER HYDROCHLORIDE 800 MG: 800 TABLET, FILM COATED PARENTERAL at 16:58

## 2019-12-18 RX ADMIN — HYDRALAZINE HYDROCHLORIDE 50 MG: 50 TABLET, FILM COATED ORAL at 21:59

## 2019-12-18 RX ADMIN — FOLIC ACID 1000 MCG: 1 TABLET ORAL at 09:40

## 2019-12-18 RX ADMIN — DULOXETINE HYDROCHLORIDE 60 MG: 60 CAPSULE, DELAYED RELEASE ORAL at 09:38

## 2019-12-18 RX ADMIN — HEPARIN SODIUM 5000 UNITS: 5000 INJECTION INTRAVENOUS; SUBCUTANEOUS at 13:23

## 2019-12-18 RX ADMIN — POTASSIUM CHLORIDE 20 MEQ: 1500 TABLET, EXTENDED RELEASE ORAL at 16:57

## 2019-12-18 RX ADMIN — DICYCLOMINE HYDROCHLORIDE 10 MG: 10 CAPSULE ORAL at 16:57

## 2019-12-18 RX ADMIN — LEVOTHYROXINE SODIUM 125 MCG: 125 TABLET ORAL at 05:08

## 2019-12-18 RX ADMIN — DOXAZOSIN 2 MG: 2 TABLET ORAL at 21:59

## 2019-12-18 RX ADMIN — ONDANSETRON 4 MG: 2 INJECTION INTRAMUSCULAR; INTRAVENOUS at 09:01

## 2019-12-18 RX ADMIN — MELATONIN 3000 UNITS: at 09:39

## 2019-12-18 RX ADMIN — ASPIRIN 81 MG 81 MG: 81 TABLET ORAL at 09:38

## 2019-12-18 RX ADMIN — CLONIDINE HYDROCHLORIDE 0.2 MG: 0.1 TABLET ORAL at 21:59

## 2019-12-18 RX ADMIN — HYDRALAZINE HYDROCHLORIDE 50 MG: 50 TABLET, FILM COATED ORAL at 13:23

## 2019-12-18 RX ADMIN — SERTRALINE HYDROCHLORIDE 200 MG: 100 TABLET ORAL at 09:39

## 2019-12-18 RX ADMIN — HYDRALAZINE HYDROCHLORIDE 50 MG: 50 TABLET, FILM COATED ORAL at 05:08

## 2019-12-18 RX ADMIN — TACROLIMUS 2 MG: 1 CAPSULE ORAL at 09:41

## 2019-12-18 RX ADMIN — ROPINIROLE 0.25 MG: 0.25 TABLET, FILM COATED ORAL at 17:00

## 2019-12-18 RX ADMIN — CLONIDINE HYDROCHLORIDE 0.2 MG: 0.1 TABLET ORAL at 13:23

## 2019-12-18 RX ADMIN — ROPINIROLE 0.25 MG: 0.25 TABLET, FILM COATED ORAL at 09:40

## 2019-12-18 RX ADMIN — SODIUM BICARBONATE 650 MG TABLET 1300 MG: at 16:57

## 2019-12-18 RX ADMIN — CLONIDINE HYDROCHLORIDE 0.2 MG: 0.1 TABLET ORAL at 05:08

## 2019-12-18 RX ADMIN — SODIUM BICARBONATE 650 MG TABLET 1300 MG: at 09:38

## 2019-12-18 RX ADMIN — HEPARIN SODIUM 5000 UNITS: 5000 INJECTION INTRAVENOUS; SUBCUTANEOUS at 05:08

## 2019-12-18 RX ADMIN — METOPROLOL TARTRATE 50 MG: 50 TABLET, FILM COATED ORAL at 21:59

## 2019-12-18 RX ADMIN — Medication 250 MG: at 09:40

## 2019-12-18 RX ADMIN — ARIPIPRAZOLE 30 MG: 10 TABLET ORAL at 21:45

## 2019-12-18 RX ADMIN — ONDANSETRON 4 MG: 2 INJECTION INTRAMUSCULAR; INTRAVENOUS at 21:52

## 2019-12-18 RX ADMIN — TACROLIMUS 2 MG: 1 CAPSULE ORAL at 21:46

## 2019-12-18 RX ADMIN — DULOXETINE HYDROCHLORIDE 60 MG: 60 CAPSULE, DELAYED RELEASE ORAL at 17:00

## 2019-12-18 RX ADMIN — AMLODIPINE BESYLATE 10 MG: 10 TABLET ORAL at 09:38

## 2019-12-18 RX ADMIN — Medication 250 MG: at 17:00

## 2019-12-18 RX ADMIN — PRAVASTATIN SODIUM 80 MG: 80 TABLET ORAL at 09:40

## 2019-12-18 RX ADMIN — PREDNISONE 5 MG: 5 TABLET ORAL at 09:39

## 2019-12-18 RX ADMIN — METOPROLOL TARTRATE 50 MG: 50 TABLET, FILM COATED ORAL at 09:40

## 2019-12-18 NOTE — ASSESSMENT & PLAN NOTE
Lab Results   Component Value Date    HGBA1C 5 1 09/09/2019       Recent Labs     12/17/19  0713 12/17/19  1124 12/17/19  1207 12/17/19  1622   POCGLU 116 117 154* 111       Blood Sugar Average: Last 72 hrs:  · (P) 121 stopped Lantus (in place of Toujeo) 5 units qHS comma will continue to monitor    · Cont SSI with accu-cheks and carb controlled diet  · Initiate hypoglycemia protocol and avoid hypoglycemia

## 2019-12-18 NOTE — SOCIAL WORK
OccupationalTherapy Progress Note(time=6829-7275)     Patient Name: Will Martines  XNBUB'B Date: 5/14/2018  Problem List  Patient Active Problem List   Diagnosis    Bipolar disorder, curr episode mixed, severe, with psychotic features ([place] [id number] )    Type 2 diabetes mellitus ([facility] )    Atrial fibrillation ([place] [address] )    Essential hypertension    Coronary artery disease involving native coronary artery of native heart    Chronic systolic heart failure ([place] [address] )    FABIOLA (generalized anxiety disorder)    AS (Referrals for placement pending  Kam Jung, MACEY, [address], DELANEY, and [facility]  CM contacted pt's father, Ria Lala [phone] to review process  Ria Dai was not sure that she was medically clear  SALTY explained that facilities will still follow and communicate if they can meet her needs when she is medically clear  Ria Lala understood  [place] Dai will be in pt's room today after 3  memory, problem-solving, visual-perceptual); wife Barthel Index Score 60   Agatha Wetzel, OT

## 2019-12-18 NOTE — ASSESSMENT & PLAN NOTE
Lab Results   Component Value Date    HGBA1C 5 1 09/09/2019       Recent Labs     12/17/19  1622 12/17/19  2101 12/18/19  0540 12/18/19  1051   POCGLU 111 113 113 147*       Blood Sugar Average: Last 72 hrs:  · (P) 099 [id number] stopped Lantus (in place of Toujeo) 5 units qHS comma will continue to monitor    · Cont SSI with accu-cheks and carb controlled diet  · Initiate hypoglycemia protocol and avoid hypoglycemia

## 2019-12-18 NOTE — ASSESSMENT & PLAN NOTE
· With history of recurrent UTIs in setting of renal and pancreatic transplant  Most recently with VRE at recent admission to [facility]  · Presenting now with abdominal pain/nausea and lethargy/confusion  · CT abdomen/pelvis showing colitis  · With positive BC with anaerobe, started on rocephin & flagyl per ID, deescalated to only Flagyl  · Stool cdiff neg, enteric panel neg, leucocytes neg  · procalcitonin 0 3  · Trend WBC, temperature curve  · Checked CMV PCR - negative  · Improving until today when she again has developed crampy abdomen pain  With mucous  ID sending repeat Cdiff  · Side effects from flagyl hence stopped by ID 12/16/2019  Hold off on Vancomycin orally for now  ID will advise

## 2019-12-18 NOTE — ASSESSMENT & PLAN NOTE
Blood pressures have remained variable  Patient currently on clonidine t i d  And Cardura  Norvasc was discontinued  Continue to monitor

## 2019-12-18 NOTE — PROGRESS NOTES
Progress Note - Infectious Disease   Lela Gee 53 y o  female MRN: [id number]  Unit/Bed#: -01 Encounter: [id number]      Impression/Recommendations:  1   Anaerobic Gram-negative bacteremia  [place] Grief GI translocation in setting of #2   CT A/P otherwise negative   Clinically stable without sepsis   Clinically improved prior to initiation of antibiotics   Status post 5 days of Flagyl complicated by severe GI side effects  Rec:  ? Continue to follow closely off antibiotics  ? Follow temperatures closely     2   Acute enterocolitis   Suspect acute viral infection   Initial stool enteric PCR, C  Diff negative   Procalcitonin bland   Clinically stable without sepsis   Abdominal exam benign  Initially improve now with worsening abdominal pain and mucousy diarrhea  Consider evolving C diff     Rec:  ? Continue to follow closely off antibiotics  ? Check stool C diff  If positive start oral vancomycin  ? Follow stool output closely  ? Serial abdominal exams  ? Supportive care as per the primary service     3   Asymptomatic bacteriuria   Seen on UA   Likely chronically colonized  Radha Robins active symptoms of UTI  No treatment indicated      4   Recent VRE UTI   Status post 7 days daptomycin with clinical improvement     5   History of renal/pancreatic transplant   1998   Has pancreatic secretions draining in to bladder   On chronic immunosuppression      6   CKD   Stable at baseline creatinine 2  5      7   MM   Recently started on Revlimid      The above plan was discussed in detail with Dr Diana Knox      Antibiotics:  Off antibiotics #2    Subjective:  Patient seen on AM rounds  Feels terrible  New onset of >8 liquid BMs with mucus and abdominal cramping since last night  Did not initially have mucus with her initial diarrheal illness  Denies fevers, chills, or sweats  24 Hour Events:  Develop diarrhea as above  Denies fevers, chills, sweats, nausea, vomiting      Objective:  Vitals:  Temp:  [98 2 °F (36 8 °C)-98 3 °F (36 8 °C)] 98 3 °F (36 8 °C)  HR:  [62-63] 62  Resp:  [16-21] 16  BP: (163-166)/(57-60) 166/60  SpO2:  [95 %-97 %] 95 %  Temp (24hrs), Av 3 °F (36 8 °C), Min:98 2 °F (36 8 °C), Max:98 3 °F (36 8 °C)  Current: Temperature: 98 3 °F (36 8 °C)    Physical Exam:   General:  No acute distress  Psychiatric:  Awake and alert  Pulmonary:  Normal respiratory excursion without accessory muscle use  Abdomen:  Soft, distended, no rebound or guarding  Extremities:  No edema  Skin:  No rashes    Lab Results:  I have personally reviewed pertinent labs  Results from last 7 days   Lab Units 19  0845 19  0855 19  0522 12/15/19  0431 19  0711 19  0555   POTASSIUM mmol/L 3 3* 3 3* 3 5 3 4* 4 5 3 4*   CHLORIDE mmol/L 117* 116* 116* 115* 114* 114*   CO2 mmol/L 16* 17* 18* 19* 18* 19*   BUN mg/dL 32* 32* 36* 43* 45* 40*   CREATININE mg/dL 2 55* 2 50* 2 44* 2 63* 2 76* 2 58*   EGFR ml/min/1 73sq m 21 21 22 20 19 20   CALCIUM mg/dL 8 4 8 2* 8 3 8 2* 8 1* 7 9*   AST U/L  --   --   --  11 27 15   ALT U/L  --   --   --  18 17 16   ALK PHOS U/L  --   --   --  91 88 91     Results from last 7 days   Lab Units 19  0909 19  0522 12/15/19  0431   WBC Thousand/uL 6 12 6 86 7 02   HEMOGLOBIN g/dL 7 6* 7 8* 7 3*   PLATELETS Thousands/uL 163 166 154     Results from last 7 days   Lab Units 19  0823   BLOOD CULTURE  No Growth at 72 hrs  Imaging Studies:   I have personally reviewed pertinent imaging study reports and images in PACS  EKG, Pathology, and Other Studies:   I have personally reviewed pertinent reports

## 2019-12-18 NOTE — ASSESSMENT & PLAN NOTE
· On chronic immunosuppression with tacrolimus and prednisone  · Unfortunately patient noncompliant with all medications since discharge including these  · Tacrolimus level was 4 > 10 > 7 (12/12) > 5 (12/14)  · dose decreased to 2 BID on 12/12, again decrease to 2 mg QAM & 1 5 mg HS from 12/14  · Repeat levels on Wednesday  · Per nephro today - level of 5 was appropriate so increased tac back to 2 mg BID at home dose

## 2019-12-18 NOTE — RESTORATIVE TECHNICIAN NOTE
Restorative Specialist Mobility Note       Activity: Other (Comment)(Educated/encouraged pt to ambulate with assistance 3-4 x's/day, pt refused 2* not feeling well/having diarrhea nursing aware   Pt callbell, phone/tray within reach )     [employer] BS, Restorative Technician, [employer]

## 2019-12-18 NOTE — ASSESSMENT & PLAN NOTE
· Appears acute on chronic  · S/P kidney and pancreatic transplant in 1998  · continue p o  and trend BMP  · Nephrology note appreciated  Patient with an RTA related to her pancreatic transplant  Continue to monitor

## 2019-12-18 NOTE — PLAN OF CARE
Problem: Potential for Falls  Goal: Patient will remain free of falls  Description  INTERVENTIONS:  - Assess patient frequently for physical needs  -  Identify cognitive and physical deficits and behaviors that affect risk of falls  -  [place] fall precautions as indicated by assessment   - Educate patient/family on patient safety including physical limitations  - Instruct patient to call for assistance with activity based on assessment  - Modify environment to reduce risk of injury  - Consider OT/PT consult to assist with strengthening/mobility  Outcome: Progressing     Problem: Prexisting or High Potential for Compromised Skin Integrity  Goal: Skin integrity is maintained or improved  Description  INTERVENTIONS:  - Identify patients at risk for skin breakdown  - Assess and monitor skin integrity  - Assess and monitor nutrition and hydration status  - Monitor labs   - Assess for incontinence   - Turn and reposition patient  - Assist with mobility/ambulation  - Relieve pressure over bony prominences  - Avoid friction and shearing  - Provide appropriate hygiene as needed including keeping skin clean and dry  - Evaluate need for skin moisturizer/barrier cream  - Collaborate with interdisciplinary team   - Patient/family teaching  - Consider wound care consult   Outcome: Progressing     Problem: Nutrition/Hydration-ADULT  Goal: Nutrient/Hydration intake appropriate for improving, restoring or maintaining nutritional needs  Description  Monitor and assess patient's nutrition/hydration status for malnutrition  Collaborate with interdisciplinary team and initiate plan and interventions as ordered  Monitor patient's weight and dietary intake as ordered or per policy  Utilize nutrition screening tool and intervene as necessary  Determine patient's food preferences and provide high-protein, high-caloric foods as appropriate       INTERVENTIONS:  - Monitor oral intake, urinary output, labs, and treatment plans  - Assess nutrition and hydration status and recommend course of action  - Evaluate amount of meals eaten  - Assist patient with eating if necessary   - Allow adequate time for meals  - Recommend/ encourage appropriate diets, oral nutritional supplements, and vitamin/mineral supplements  - Order, calculate, and assess calorie counts as needed  - Recommend, monitor, and adjust tube feedings and TPN/PPN based on assessed needs  - Assess need for intravenous fluids  - Provide specific nutrition/hydration education as appropriate  - Include patient/family/caregiver in decisions related to nutrition  Outcome: Progressing     Problem: PAIN - ADULT  Goal: Verbalizes/displays adequate comfort level or baseline comfort level  Description  Interventions:  - Encourage patient to monitor pain and request assistance  - Assess pain using appropriate pain scale  - Administer analgesics based on type and severity of pain and evaluate response  - Implement non-pharmacological measures as appropriate and evaluate response  - Consider cultural and social influences on pain and pain management  - Notify physician/advanced practitioner if interventions unsuccessful or patient reports new pain  Outcome: Progressing     Problem: INFECTION - ADULT  Goal: Absence or prevention of progression during hospitalization  Description  INTERVENTIONS:  - Assess and monitor for signs and symptoms of infection  - Monitor lab/diagnostic results  - Monitor all insertion sites, i e  indwelling lines, tubes, and drains  - Monitor endotracheal if appropriate and nasal secretions for changes in amount and color  - [place] appropriate cooling/warming therapies per order  - Administer medications as ordered  - Instruct and encourage patient and family to use good hand hygiene technique  - Identify and instruct in appropriate isolation precautions for identified infection/condition  Outcome: Progressing  Goal: Absence of fever/infection during neutropenic period  Description  INTERVENTIONS:  - Monitor WBC    Outcome: Progressing     Problem: SAFETY ADULT  Goal: Maintain or return to baseline ADL function  Description  INTERVENTIONS:  -  Assess patient's ability to carry out ADLs; assess patient's baseline for ADL function and identify physical deficits which impact ability to perform ADLs (bathing, care of mouth/teeth, toileting, grooming, dressing, etc )  - Assess/evaluate cause of self-care deficits   - Assess range of motion  - Assess patient's mobility; develop plan if impaired  - Assess patient's need for assistive devices and provide as appropriate  - Encourage maximum independence but intervene and supervise when necessary  - Involve family in performance of ADLs  - Assess for home care needs following discharge   - Consider OT consult to assist with ADL evaluation and planning for discharge  - Provide patient education as appropriate  Outcome: Progressing  Goal: Maintain or return mobility status to optimal level  Description  INTERVENTIONS:  - Assess patient's baseline mobility status (ambulation, transfers, stairs, etc )    - Identify cognitive and physical deficits and behaviors that affect mobility  - Identify mobility aids required to assist with transfers and/or ambulation (gait belt, sit-to-stand, lift, walker, cane, etc )  - [place] fall precautions as indicated by assessment  - Record patient progress and toleration of activity level on Mobility SBAR; progress patient to next Phase/Stage  - Instruct patient to call for assistance with activity based on assessment  - Consider rehabilitation consult to assist with strengthening/weightbearing, etc   Outcome: Progressing     Problem: DISCHARGE PLANNING  Goal: Discharge to home or other facility with appropriate resources  Description  INTERVENTIONS:  - Identify barriers to discharge w/patient and caregiver  - Arrange for needed discharge resources and transportation as appropriate  - Identify discharge learning needs (meds, wound care, etc )  - Arrange for interpretive services to assist at discharge as needed  - Refer to Case Management Department for coordinating discharge planning if the patient needs post-hospital services based on physician/advanced practitioner order or complex needs related to functional status, cognitive ability, or social support system  Outcome: Progressing     Problem: Knowledge Deficit  Goal: Patient/family/caregiver demonstrates understanding of disease process, treatment plan, medications, and discharge instructions  Description  Complete learning assessment and assess knowledge base    Interventions:  - Provide teaching at level of understanding  - Provide teaching via preferred learning methods  Outcome: Progressing

## 2019-12-18 NOTE — ASSESSMENT & PLAN NOTE
· Persistent since last admission; C diff during at recent admission at [facility] was negative  Id feels this may have been a viral gastroenteritis  · CT abdomen/pelvis with evidence of colitis of proximal to mid sigmoid and distal left colon  · Final diagnosis appears to be a viral gastroenteritis  Continuing to observe patient off of metronidazole

## 2019-12-18 NOTE — ASSESSMENT & PLAN NOTE
GI loss   replete  Resolved, check serial laboratories and replete as needed potassium 3 3 this a m  Will re-evaluate in a m   And replete as needed

## 2019-12-18 NOTE — ASSESSMENT & PLAN NOTE
· With history of recurrent UTIs in setting of renal and pancreatic transplant    Most recently with VRE at recent admission to [facility]  · Presenting now with abdominal pain/nausea and lethargy/confusion  · CT abdomen/pelvis showing colitis  · With positive BC with anaerobe, started on rocephin & flagyl per ID, deescalated to only Flagyl  · Stool cdiff neg, enteric panel neg, leucocytes neg  · procalcitonin 0 3  · Trend WBC, temperature curve  · Checked CMV PCR - negative  · Improving  · Side effects from flagyl hence stopped by ID 12/16/2019

## 2019-12-18 NOTE — PROGRESS NOTES
Progress Note - Chi Morales 1965, 53 y o  female MRN: [id number]    Unit/Bed#: -01 Encounter: [id number]    Primary Care Provider: Mike Gross MD   Date and time admitted to hospital: 12/9/2019  8:29 PM        Bacteremia  Assessment & Plan  · 2/2 BC from admission growing anaerobes  · Was started on ceftriaxone and Flagyl by ID now ceftriaxone discontinued and continue Flagyl with plan of total 7 day course which was discontinued on 12/16/2019  · Source is suspected to be GI secondary to colitis with translocation, ID reviewed the new symptoms  CDIff reordered  · Repeat BC from 12/14 neg so far    Acute metabolic encephalopathy  Assessment & Plan  Patient back to baseline  Neuropsychiatric evaluation shows she has capacity for decision making  She is however feeling quite poorly today, new crampy abdomen pain;    Diarrhea  Assessment & Plan  · Persistent since last admission; C diff during at recent admission at [facility] was negative  Id feels this may have been a viral gastroenteritis  · CT abdomen/pelvis with evidence of colitis of proximal to mid sigmoid and distal left colon  · Final diagnosis appears to be a viral gastroenteritis  Continuing to observe patient off of metronidazole  Hypokalemia  Assessment & Plan  GI loss   replete  Resolved, check serial laboratories and replete as needed potassium 3 3 this a m  Again and down with frequent stooling formed with mucous  Will give oral potassium and reassess in the am     * Enterocolitis  Assessment & Plan  · With history of recurrent UTIs in setting of renal and pancreatic transplant    Most recently with VRE at recent admission to [facility]  · Presenting now with abdominal pain/nausea and lethargy/confusion  · CT abdomen/pelvis showing colitis  · With positive BC with anaerobe, started on rocephin & flagyl per ID, deescalated to only Flagyl  · Stool cdiff neg, enteric panel neg, leucocytes neg  · procalcitonin 0 3  · Trend WBC, temperature curve  · Checked CMV PCR - negative  · Improving until today when she again has developed crampy abdomen pain  With mucous  ID sending repeat Cdiff  · Side effects from flagyl hence stopped by ID 12/16/2019  Hold off on Vancomycin orally for now  ID will advise  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 [facility])  Assessment & Plan  Lab Results   Component Value Date    HGBA1C 5 1 09/09/2019       Recent Labs     12/17/19  1622 12/17/19  2101 12/18/19  0540 12/18/19  1051   POCGLU 111 113 113 147*       Blood Sugar Average: Last 72 hrs:  · (P) 458 [id number] stopped Lantus (in place of [place]) 5 units qHS comma will continue to monitor  · Cont SSI with accu-cheks and carb controlled diet  · Initiate hypoglycemia protocol and avoid hypoglycemia    Essential hypertension  Assessment & Plan  Blood pressures have remained variable  Patient currently on clonidine t i d  And Cardura  Norvasc was discontinued  Continue to monitor  Multiple myeloma not having achieved remission [facility])  Assessment & Plan  · Follows with Dr Piero Brady as outpatient  · Continue Revlimid at home dosage  · Monitor hb, running low  · H/o transfusions, hemoglobin has remained stable at 7 6  Will continue hold on transfusion as she has multiple autoantibodies noted  Recheck in a [facility] Chronic kidney disease, stage 4 (severe) ([facility])  Assessment & Plan  · Creatinine has been relatively stable    Continue to monitor with serial laboratories  · Monitor volume status, not all UO documented  · Monitor BMP while inpatient  · Avoid nephrotoxins as able, renally dose medications as indicated  · May need to initiate some IV fluids if patient not eating or drinking today    Renal transplant recipient  Assessment & Plan  · On chronic immunosuppression with tacrolimus and prednisone  · Unfortunately patient noncompliant with all medications since discharge including these  · Tacrolimus level was 4 > 10 > 7 () > 5 ()  · dose decreased to 2 BID on , again decrease to 2 mg QAM & 1 5 mg HS from   · Repeat levels on Wednesday  · Per nephro today - level of 5 was appropriate so increased tac back to 2 mg BID at home dose  Level currently pending will follow up    Metabolic acidosis  Assessment & Plan  · Appears acute on chronic  · S/P kidney and pancreatic transplant in   · continue p o  and trend BMP  · Nephrology note appreciated  Patient with an RTA related to her pancreatic transplant  Continue to monitor  Asymptomatic bacteriuria  Assessment & Plan  · Recently completed 7 D course of IV daptomycin for VRE UTI  Monitoring off antibiotic therapy  · Has positive cultures for VRE again, but this is not the cause of her symptoms, its colonization  · Monitor off Abx  · ID on board        VTE Pharmacologic Prophylaxis:   Pharmacologic: Heparin  Mechanical: Mechanical VTE prophylaxis in place  Patient Centered Rounds: I have performed bedside rounds with nursing staff today  Discussions with Specialists or Other Care Team Provider:  Reviewed with ID, reviewed Nephrology note  Education and Discussions with Family / Patient:  No family at bedside  Time Spent for Care: 30 minutes  If More than 50% of total time spent on counseling and coordination of care as described above indicate yes or no and described the counseling and coordination:  None    Current Length of Stay: 8 day(s)  Current Patient Status: Inpatient   Certification Statement: The patient will continue to require additional inpatient hospital stay due to Return of diarrhea loose stool and abdominal pain with cramping    Discharge Plan: To be determined  Code Status: Level 1 - Full Code    Subjective:   Patient is having some crampy abdominal pain  Noted to have frequent loose stooling with mucus no blood      Objective:   Vitals:   Temp (24hrs), Av 3 °F (36 8 °C), Min:98 2 °F (36 8 °C), Max:98 3 °F (36 8 °C)    Temp:  [98 2 °F (36 8 °C)-98 3 °F (36 8 °C)] 98 3 °F (36 8 °C)  HR:  [62-63] 62  Resp:  [16-21] 16  BP: (163-166)/(57-60) 166/60  SpO2:  [95 %-97 %] 95 %  Body mass index is 36 2 kg/m²  Input and Output Summary (last 24 hours): Intake/Output Summary (Last 24 hours) at 12/18/2019 1503  Last data filed at 12/18/2019 1200  Gross per 24 hour   Intake 880 ml   Output 951 ml   Net -71 ml       Physical Exam:  General well-developed moderately overweight female no acute distress normocephalic atraumatic pupils equal round and reactive to light extraocular muscles intact mucous membranes are moist neck is supple there is no JVD no lymph nodes no carotid bruits chest is clear to auscultation no rhonchi rales or wheezes  Cardiovascular regular rhythm positive S1 and S2 no S3-S4 murmur or gallop  Abdomen is soft but mildly distended positive bowel sounds are present no guarding or rebound  Extremities no clubbing cyanosis edema    Physical Exam    Additional Data:   Labs:  Results from last 7 days   Lab Units 12/18/19  0909 12/16/19  0522 12/15/19  0431   WBC Thousand/uL 6 12 6 86 7 02   HEMOGLOBIN g/dL 7 6* 7 8* 7 3*   HEMATOCRIT % 24 9* 25 4* 23 9*   PLATELETS Thousands/uL 163 166 154   NEUTROS PCT %  --   --  58   LYMPHS PCT %  --   --  27   LYMPHO PCT %  --  17  --    MONOS PCT %  --   --  8   MONO PCT %  --  3*  --    EOS PCT %  --  4 4     Results from last 7 days   Lab Units 12/18/19  0845  12/15/19  0431   POTASSIUM mmol/L 3 3*   < > 3 4*   CHLORIDE mmol/L 117*   < > 115*   CO2 mmol/L 16*   < > 19*   BUN mg/dL 32*   < > 43*   CREATININE mg/dL 2 55*   < > 2 63*   CALCIUM mg/dL 8 4   < > 8 2*   ALK PHOS U/L  --   --  91   ALT U/L  --   --  18   AST U/L  --   --  11    < > = values in this interval not displayed  * I Have Reviewed All Lab Data Listed Above  * Additional Pertinent Lab Tests Reviewed:  All Labs Within Last 24 Hours Reviewed    Imaging:    Imaging Reports Reviewed Today Include:  Abdominal x-ray pending    Cultures:   Blood Culture:   Lab Results   Component Value Date    BLOODCX No Growth After 4 Days  12/14/2019    BLOODCX Porphyromonas asaccharolytica (A) 12/09/2019    BLOODCX Porphyromonas asaccharolytica (A) 12/09/2019    BLOODCX No Growth After 5 Days  10/01/2019    BLOODCX No Growth After 5 Days  10/01/2019    BLOODCX No Growth After 5 Days  11/16/2017    BLOODCX No Growth After 5 Days   09/13/2017     Urine Culture:   Lab Results   Component Value Date    URINECX (A) 12/09/2019     >100,000 cfu/ml Vancomycin Resistant Enterococcus faecium    URINECX (A) 12/09/2019     8113-0248 cfu/ml Gram-negative cintia- species    URINECX (A) 11/24/2019     >100,000 cfu/ml Vancomycin Resistant Enterococcus faecium    URINECX No Growth <1000 cfu/mL 11/05/2019    URINECX <10,000 cfu/ml  11/04/2019    URINECX >100,000 cfu/ml Escherichia coli (A) 10/23/2019    URINECX 10,000-19,000 cfu/ml  10/23/2019     Sputum Culture: No components found for: SPUTUMCX  Wound Culture: No results found for: WOUNDCULT    Last 24 Hours Medication List:     Current Facility-Administered Medications:  acetaminophen 650 mg Oral Q6H PRN Kourtney Ortiz PA-C   aluminum-magnesium hydroxide-simethicone 30 mL Oral Q4H PRN Kourtney Ortiz PA-C   amLODIPine 10 mg Oral Daily Melissa Lyle MD   ARIPiprazole 30 mg Oral HS Shalonda Bar, DO   aspirin 81 mg Oral Daily Shalonda Bar, DO   busPIRone 5 mg Oral BID Shalonda Bar, DO   cholecalciferol 3,000 Units Oral Daily Shalonda Bar, DO   cloNIDine 0 2 mg Oral Q8H [address] Dominic Ramirez MD   dicyclomine 10 mg Oral TID PRN Armando Martines MD   doxazosin 2 mg Oral HS Dominic Ramirez MD   DULoxetine 60 mg Oral BID Shalonda Bar, DO   ferrous sulfate 325 mg Oral Daily With Breakfast Shalonda Bar, DO   folic acid 4,927 mcg Oral Daily Shalonda Bar, DO   heparin (porcine) 5,000 Units Subcutaneous [facility] Shalonda Mayes DO   hydrALAZINE 5 mg Intravenous Q6H PRN Armando Martines MD hydrALAZINE 50 mg Oral Q8H [address] Gavin Acevedo MD   insulin lispro 1-5 Units Subcutaneous HS [place], DO   insulin lispro 1-6 Units Subcutaneous TID AC Denys Graf MD   levothyroxine 125 mcg Oral Early Morning [place], DO   LORazepam 2 mg Oral TID PRN [place], DO   metoprolol tartrate 50 mg Oral Q12H [address] [place], DO   ondansetron 4 mg Intravenous Q4H PRN Frieda Arevalo MD   potassium chloride 20 mEq Oral Once Román Washburn MD   pravastatin 80 mg Oral Daily [place], DO   predniSONE 5 mg Oral Daily [place], DO   rOPINIRole 0 25 mg Oral BID [place], DO   saccharomyces boulardii 250 mg Oral BID [place], DO   sertraline 200 mg Oral Daily [place], DO   sevelamer 800 mg Oral TID With Meals [place], DO   sodium bicarbonate 1,300 mg Oral BID after meals Randall Pepe MD   tacrolimus 2 mg Oral Daily Seamus Fu, DO   tacrolimus 2 mg Oral HS Randall Pepe MD        Today, Patient Was Seen By: Román Washburn MD    ** Please Note: Dragon 360 Dictation voice to text software may have been used in the creation of this document  **      Addendum:  X-ray of the abdomen reviewed  Patient has stool and contrast no evidence for obstruction  Bentyl as been ordered and can be given, for crampy abdominal pain  Would avoid narcotics

## 2019-12-18 NOTE — PROGRESS NOTES
NEPHROLOGY PROGRESS NOTE    Da Fraser 53 y o  female MRN: [id number]  Unit/Bed#: -01 Encounter: [id number]  Reason for Consult:  Chronic kidney disease and renal transplant recipient    The patient is awake and alert today she is not feeling well she states last evening diarrhea started again  When I asked her how many times she said too many to count  Patient says she is feeling crampy having diarrhea  Denied vomiting or nausea for me this morning  ASSESSMENT/PLAN:  1  Renal    Patient has renal transplantation with baseline creatinine of around 2 5 which she has been stable at after her initial acute renal insult  At this point she was volume depleted but now creatinine seems to be stable at baseline it is pending this morning  For now continue to hold off on IV fluids as follow with oral intake  She is on immunosuppressive is with prednisone and tacrolimus  Trough level apparently was ordered  Will monitor last 1 was in the therapeutic range  Continue tacrolimus and prednisone for immunosuppression  Continue oral bicarbonate for chronic metabolic acidosis  Monitor volume status    2  Diarrhea    Patient was felt to have acute enterocolitis  Earlier in the hospitalization C diff was negative  She had been placed on Flagyl for 5 days diarrhea was improved until last evening  Infectious Disease will continue to monitor  3  History multiple myeloma  On treatment per Hematology-Oncology  4  Recent VRE UTI  Patient received 7 days of daptomycin  She is felt to have asymptomatic bacteriuria  SUBJECTIVE:  Review of Systems   Constitution: Positive for malaise/fatigue  Negative for chills, fever and night sweats  HENT: Negative  Eyes: Negative  Cardiovascular: Negative  Negative for chest pain, dyspnea on exertion, leg swelling and orthopnea  Respiratory: Negative  Negative for cough, shortness of breath, sputum production and wheezing  Skin: Negative    Negative for itching and rash  Gastrointestinal: Positive for abdominal pain and diarrhea  Negative for bloating, nausea and vomiting  Genitourinary: Negative  Negative for bladder incontinence, dysuria, hematuria and incomplete emptying  Neurological: Negative  Psychiatric/Behavioral: Negative  Negative for altered mental status  OBJECTIVE:  Current Weight: Weight - Scale: 108 kg (238 lb 1 5 oz)  Vitals:Temp (24hrs), Av 3 °F (36 8 °C), Min:98 2 °F (36 8 °C), Max:98 3 °F (36 8 °C)  Current: Temperature: 98 3 °F (36 8 °C)   Blood pressure 166/60, pulse 62, temperature 98 3 °F (36 8 °C), resp  rate 16, height 5' 8" (1 727 m), weight 108 kg (238 lb 1 5 oz), SpO2 95 %  , Body mass index is 36 2 kg/m²  Intake/Output Summary (Last 24 hours) at 2019 0837  Last data filed at 2019 0300  Gross per 24 hour   Intake 880 ml   Output 1350 ml   Net -470 ml       Physical Exam: /60   Pulse 62   Temp 98 3 °F (36 8 °C)   Resp 16   Ht 5' 8" (1 727 m)   Wt 108 kg (238 lb 1 5 oz)   LMP  (LMP Unknown)   SpO2 95%   BMI 36 20 kg/m²   Physical Exam   Constitutional: She is oriented to person, place, and time  She does not appear ill  HENT:   Head: Atraumatic  Mouth/Throat: Oropharynx is clear and moist    Eyes: EOM are normal  No scleral icterus  Cardiovascular: Normal rate and regular rhythm  Exam reveals no gallop and no friction rub  Pulmonary/Chest: Effort normal and breath sounds normal  No respiratory distress  She has no wheezes  She has no rhonchi  She has no rales  Abdominal: Soft  Normal appearance and bowel sounds are normal  She exhibits no distension  There is no tenderness  Neurological: She is alert and oriented to person, place, and time         Medications:    Current Facility-Administered Medications:     acetaminophen (TYLENOL) tablet 650 mg, 650 mg, Oral, Q6H PRN, Kourtney Ortiz PA-C, 650 mg at 19 1059    aluminum-magnesium hydroxide-simethicone (MYLANTA) 200-200-20 mg/5 mL oral suspension 30 mL, 30 mL, Oral, Q4H PRN, Kourtney Ortiz PA-C, 30 mL at 12/11/19 0148    amLODIPine (NORVASC) tablet 10 mg, 10 mg, Oral, Daily, Melissa Lyle MD, 10 mg at 12/17/19 0848    ARIPiprazole (ABILIFY) tablet 30 mg, 30 mg, Oral, HS, Jennifer Alu, DO, 30 mg at 12/17/19 2109    aspirin chewable tablet 81 mg, 81 mg, Oral, Daily, Jennifer Alu, DO, 81 mg at 12/17/19 0849    busPIRone (BUSPAR) tablet 5 mg, 5 mg, Oral, BID, Jennifer Alu, DO, 5 mg at 12/17/19 1754    cholecalciferol (VITAMIN D3) tablet 3,000 Units, 3,000 Units, Oral, Daily, Jennifer Alu, DO, 3,000 Units at 12/17/19 0849    cloNIDine (CATAPRES) tablet 0 2 mg, 0 2 mg, Oral, Q8H [address], Bharath Man MD, 0 2 mg at 12/18/19 0508    dicyclomine (BENTYL) capsule 10 mg, 10 mg, Oral, TID PRN, Miriam Adorno MD, 10 mg at 12/16/19 1547    doxazosin (CARDURA) tablet 2 mg, 2 mg, Oral, HS, Bharath Man MD, 2 mg at 12/17/19 2109    DULoxetine (CYMBALTA) delayed release capsule 60 mg, 60 mg, Oral, BID, Jennifer Alu, DO, 60 mg at 12/17/19 1753    ferrous sulfate tablet 325 mg, 325 mg, Oral, Daily With Breakfast, Jennifer Alu, DO, 325 mg at 06/84/48 7337    folic acid (FOLVITE) tablet 1,000 mcg, 1,000 mcg, Oral, Daily, Jennifer Alu, DO, 1,000 mcg at 12/17/19 0849    heparin (porcine) subcutaneous injection 5,000 Units, 5,000 Units, Subcutaneous, Q8H [address], 5,000 Units at 12/18/19 0508 **AND** [CANCELED] Platelet count, , , Once, Jennifer Nelson, DO    hydrALAZINE (APRESOLINE) injection 5 mg, 5 mg, Intravenous, Q6H PRN, Miriam Adorno MD, 5 mg at 12/16/19 1733    hydrALAZINE (APRESOLINE) tablet 50 mg, 50 mg, Oral, Q8H [address], Bharath Man MD, 50 mg at 12/18/19 0508    insulin lispro (HumaLOG) 100 units/mL subcutaneous injection 1-5 Units, 1-5 Units, Subcutaneous, HS, Jennifer Nelson, DO    insulin lispro (HumaLOG) 100 units/mL subcutaneous injection 1-6 Units, 1-6 Units, Subcutaneous, TID AC, 1 Units at 12/17/19 1208 **AND** Fingerstick Glucose (POCT), , , 4x Daily AC and at bedtime, Priti Davis MD    levothyroxine tablet 125 mcg, 125 mcg, Oral, Early Morning, Cipriano Thakur DO, 125 mcg at 12/18/19 0508    LORazepam (ATIVAN) tablet 2 mg, 2 mg, Oral, TID PRN, Cipriano Thakur DO, 2 mg at 12/16/19 1100    metoprolol tartrate (LOPRESSOR) tablet 50 mg, 50 mg, Oral, Q12H [facility], Cipriano Doansbury, DO, 50 mg at 12/17/19 2109    ondansetron (ZOFRAN) injection 4 mg, 4 mg, Intravenous, Q4H PRN, Barry Francois MD, 4 mg at 12/17/19 2032    pravastatin (PRAVACHOL) tablet 80 mg, 80 mg, Oral, Daily, Cipriano Thakur DO, 80 mg at 12/17/19 0848    predniSONE tablet 5 mg, 5 mg, Oral, Daily, Cipriano Doansbury, DO, 5 mg at 12/17/19 0849    rOPINIRole (REQUIP) tablet 0 25 mg, 0 25 mg, Oral, BID, Cipriano Thakur, DO, 0 25 mg at 12/17/19 1753    saccharomyces boulardii (FLORASTOR) capsule 250 mg, 250 mg, Oral, BID, Cipriano Doansbury, DO, 250 mg at 12/17/19 1753    sertraline (ZOLOFT) tablet 200 mg, 200 mg, Oral, Daily, Cipriano Doansbury, DO, 200 mg at 12/17/19 0849    sevelamer (RENAGEL) tablet 800 mg, 800 mg, Oral, TID With Meals, Cipriano Doansbury, DO, 800 mg at 12/17/19 1636    sodium bicarbonate tablet 1,300 mg, 1,300 mg, Oral, BID after meals, Yamil Berg MD, 1,300 mg at 12/17/19 1636    tacrolimus (PROGRAF) capsule 2 mg, 2 mg, Oral, Daily, Seamuslivia Fu DO, 2 mg at 12/17/19 0851    tacrolimus (PROGRAF) capsule 2 mg, 2 mg, Oral, HS, Yamil Berg MD, 2 mg at 12/17/19 2110    Laboratory Results:  Lab Results   Component Value Date    WBC 6 86 12/16/2019    HGB 7 8 (L) 12/16/2019    HCT 25 4 (L) 12/16/2019     (H) 12/16/2019     12/16/2019     Lab Results   Component Value Date    SODIUM 142 12/17/2019    K 3 3 (L) 12/17/2019     (H) 12/17/2019    CO2 17 (L) 12/17/2019    BUN 32 (H) 12/17/2019    CREATININE 2 50 (H) 12/17/2019    GLUC 150 (H) 12/17/2019    CALCIUM 8 2 (L) 12/17/2019     Lab Results Component Value Date    CALCIUM 8 2 (L) 12/17/2019    PHOS 4 7 (H) 12/04/2019     No results found for: LABPROT

## 2019-12-18 NOTE — ASSESSMENT & PLAN NOTE
· 2/2 BC from admission growing anaerobes  · Was started on ceftriaxone and Flagyl by ID now ceftriaxone discontinued and continue Flagyl with plan of total 7 day course which was discontinued on 12/16/2019  · Source is suspected to be GI secondary to colitis with translocation  · Repeat BC from 12/14 neg so far

## 2019-12-18 NOTE — ASSESSMENT & PLAN NOTE
· Creatinine has been relatively stable    Continue to monitor with serial laboratories  · Monitor volume status, not all UO documented  · Monitor BMP while inpatient  · Avoid nephrotoxins as able, renally dose medications as indicated

## 2019-12-18 NOTE — ASSESSMENT & PLAN NOTE
· Recently completed 7 D course of IV daptomycin for VRE UTI    Monitoring off antibiotic therapy  · Has positive cultures for VRE again, but this is not the cause of her symptoms, its colonization  · Monitor off Abx  · ID on board

## 2019-12-18 NOTE — OCCUPATIONAL THERAPY NOTE
Occupational Therapy Cancellation Note  Attempted to see patient for OT treatment session, however pt off the floor  OT to continue to follow and re-attempt as time permits      Idania Panchal, TANJA, OTR/L

## 2019-12-18 NOTE — ASSESSMENT & PLAN NOTE
· Follows with Dr Rachel Humphries as outpatient  · Continue Revlimid at home dosage  · Monitor hb, running low  · H/o transfusions, hemoglobin has remained stable at 7 6  Will continue hold on transfusion as she has multiple autoantibodies noted  Recheck in a jessica Jenkins

## 2019-12-18 NOTE — ASSESSMENT & PLAN NOTE
GI loss   replete  Resolved, check serial laboratories and replete as needed potassium 3 3 this a m  Again and down with frequent stooling formed with mucous    Will give oral potassium and reassess in the am

## 2019-12-18 NOTE — ASSESSMENT & PLAN NOTE
· Follows with Dr Tim Boucher as outpatient  · Continue Revlimid at home dosage  · Monitor hb, running low  · H/o transfusions, type and screen will be obtained in a m  In case hemoglobin comes back low

## 2019-12-18 NOTE — ASSESSMENT & PLAN NOTE
Patient back to baseline  Neuropsychiatric evaluation shows she has capacity for decision making    She is however feeling quite poorly today, new crampy abdomen pain;

## 2019-12-18 NOTE — ASSESSMENT & PLAN NOTE
· 2/2 BC from admission growing anaerobes  · Was started on ceftriaxone and Flagyl by ID now ceftriaxone discontinued and continue Flagyl with plan of total 7 day course which was discontinued on 12/16/2019  · Source is suspected to be GI secondary to colitis with translocation, ID reviewed the new symptoms  CDIff reordered    · Repeat BC from 12/14 neg so far

## 2019-12-18 NOTE — ASSESSMENT & PLAN NOTE
· Persistent since last admission; C diff during at recent admission at [facility] [facility] was negative  Id feels this may have been a viral gastroenteritis  · CT abdomen/pelvis with evidence of colitis of proximal to mid sigmoid and distal left colon  · Final diagnosis appears to be a viral gastroenteritis  Continuing to observe patient off of metronidazole

## 2019-12-18 NOTE — ASSESSMENT & PLAN NOTE
Patient had become significantly worse cognitively over the last few days to the point where a neuropsychological test was recommended after OT spoke with the patient who seemingly was exhibiting deficits  Nursing and patient report today is a better today she is much more back to herself  We await neuro psychological testing to determine patient's capacity  Continuing to monitor patient off antibiotic therapy

## 2019-12-18 NOTE — PROGRESS NOTES
Progress Note - Shelly Oleksandr 1965, 53 y o  female MRN: [id number]    Unit/Bed#: -01 Encounter: [id number]    Primary Care Provider: Idris Bourgeois MD   Date and time admitted to hospital: 12/9/2019  8:29 PM        Bacteremia  Assessment & Plan  · 2/2 BC from admission growing anaerobes  · Was started on ceftriaxone and Flagyl by ID now ceftriaxone discontinued and continue Flagyl with plan of total 7 day course which was discontinued on 12/16/2019  · Source is suspected to be GI secondary to colitis with translocation  · Repeat BC from 12/14 neg so far    Acute metabolic encephalopathy  Assessment & Plan  Patient had become significantly worse cognitively over the last few days to the point where a neuropsychological test was recommended after OT spoke with the patient who seemingly was exhibiting deficits  Nursing and patient report today is a better today she is much more back to herself  We await neuro psychological testing to determine patient's capacity  Continuing to monitor patient off antibiotic therapy  Diarrhea  Assessment & Plan  · Persistent since last admission; C diff during at recent admission at [facility] was negative  Id feels this may have been a viral gastroenteritis  · CT abdomen/pelvis with evidence of colitis of proximal to mid sigmoid and distal left colon  · Final diagnosis appears to be a viral gastroenteritis  Continuing to observe patient off of metronidazole  Hypokalemia  Assessment & Plan  GI loss   replete  Resolved, check serial laboratories and replete as needed potassium 3 3 this a m  Will re-evaluate in a m  And replete as needed    * Enterocolitis  Assessment & Plan  · With history of recurrent UTIs in setting of renal and pancreatic transplant    Most recently with VRE at recent admission to [facility]  · Presenting now with abdominal pain/nausea and lethargy/confusion  · CT abdomen/pelvis showing colitis  · With positive BC with anaerobe, started on rocephin & flagyl per ID, deescalated to only Flagyl  · Stool cdiff neg, enteric panel neg, leucocytes neg  · procalcitonin 0 3  · Trend WBC, temperature curve  · Checked CMV PCR - negative  · Improving  · Side effects from flagyl hence stopped by ID 12/16/2019    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 [facility])  Assessment & Plan  Lab Results   Component Value Date    HGBA1C 5 1 09/09/2019       Recent Labs     12/17/19  0713 12/17/19  1124 12/17/19  1207 12/17/19  1622   POCGLU 116 117 154* 111       Blood Sugar Average: Last 72 hrs:  · (P) 121 stopped Lantus (in place of Toujeo) 5 units qHS comma will continue to monitor  · Cont SSI with accu-cheks and carb controlled diet  · Initiate hypoglycemia protocol and avoid hypoglycemia    Essential hypertension  Assessment & Plan  Blood pressures have remained variable  Patient currently on clonidine t i d  And Cardura  Norvasc was discontinued  Continue to monitor  Multiple myeloma not having achieved remission [facility])  Assessment & Plan  · Follows with Dr Freida Hooks as outpatient  · Continue Revlimid at home dosage  · Monitor hb, running low  · H/o transfusions, type and screen will be obtained in a m  In case hemoglobin comes back low  Chronic kidney disease, stage 4 (severe) ([facility])  Assessment & Plan  · Creatinine has been relatively stable    Continue to monitor with serial laboratories  · Monitor volume status, not all UO documented  · Monitor BMP while inpatient  · Avoid nephrotoxins as able, renally dose medications as indicated    Renal transplant recipient  Assessment & Plan  · On chronic immunosuppression with tacrolimus and prednisone  · Unfortunately patient noncompliant with all medications since discharge including these  · Tacrolimus level was 4 > 10 > 7 (12/12) > 5 (12/14)  · dose decreased to 2 BID on 12/12, again decrease to 2 mg QAM & 1 5 mg HS from 12/14  · Repeat levels on Wednesday  · Per nephro today - level of 5 was appropriate so increased tac back to 2 mg BID at home dose    Metabolic acidosis  Assessment & Plan  · Appears acute on chronic  · S/P kidney and pancreatic transplant in 1998  · continue p o  and trend BMP  · Nephrology note appreciated  Patient with an RTA related to her pancreatic transplant  Continue to monitor  Asymptomatic bacteriuria  Assessment & Plan  · Recently completed 7 D course of IV daptomycin for VRE UTI  Monitoring off antibiotic therapy  · Has positive cultures for VRE again, but this is not the cause of her symptoms, its colonization  · Monitor off Abx  · ID on board        VTE Pharmacologic Prophylaxis:   Pharmacologic: Heparin  Mechanical: Mechanical VTE prophylaxis in place  Patient Centered Rounds: I have performed bedside rounds with nursing staff today  Discussions with Specialists or Other Care Team Provider:  Reviewed most recent consultant notes  Education and Discussions with Family / Patient:  No family present but the patient has me the ability to communicate  Time Spent for Care: 30 minutes  If More than 50% of total time spent on counseling and coordination of care as described above indicate yes or no and described the counseling and coordination:  No    Current Length of Stay: 7 day(s)  Current Patient Status: Inpatient   Certification Statement: The patient will continue to require additional inpatient hospital stay due to Continue to titrate medications  Discharge Plan: To be determined possibly in the next 24-48 hours  Code Status: Level 1 - Full Code    Subjective:   Patient states she is feeling much more like herself  She reports no new issues  She states that her appetite has returned and she not have the bad taste in her mouth  She reports no nausea vomiting  She has not had a bowel movement for several days, will consider adding back senna or stool softener if she has no movement later today      Objective:   Vitals:   Temp (24hrs), Av 2 °F (36 8 °C), Min:98 2 °F (36 8 °C), Max:98 3 °F (36 8 °C)    Temp:  [98 2 °F (36 8 °C)-98 3 °F (36 8 °C)] 98 3 °F (36 8 °C)  HR:  [63-64] 63  Resp:  [17-20] 20  BP: (147-176)/(57-67) 163/57  SpO2:  [95 %-97 %] 97 %  Body mass index is 36 31 kg/m²  Input and Output Summary (last 24 hours): Intake/Output Summary (Last 24 hours) at 2019  Last data filed at 2019 1829  Gross per 24 hour   Intake 720 ml   Output 1250 ml   Net -530 ml       Physical Exam:  Generally well-developed moderately overweight female no acute distress normocephalic atraumatic pupils equal round and reactive to light extraocular muscles intact mucous membranes are moist neck is supple there is no JVD no lymph nodes no carotid bruits chest is decreased but clear to auscultation is no rhonchi rales or wheezes  Cardiovascular regular rate rhythm positive S1 and S2 no S3-S4 murmur or gallop  Abdomen is soft nontender nondistended with positive bowel sounds no hepatosplenomegaly agree no guarding rebound  Neurologically patient is awake alert oriented cranial nerves 2-12 appear to be intact she is cooperative and does not appear to be confused    Physical Exam    Additional Data:   Labs:  Results from last 7 days   Lab Units 19  0522 12/15/19  0431   WBC Thousand/uL 6 86 7 02   HEMOGLOBIN g/dL 7 8* 7 3*   HEMATOCRIT % 25 4* 23 9*   PLATELETS Thousands/uL 166 154   NEUTROS PCT %  --  58   LYMPHS PCT %  --  27   LYMPHO PCT % 17  --    MONOS PCT %  --  8   MONO PCT % 3*  --    EOS PCT % 4 4     Results from last 7 days   Lab Units 19  0855  12/15/19  0431   POTASSIUM mmol/L 3 3*   < > 3 4*   CHLORIDE mmol/L 116*   < > 115*   CO2 mmol/L 17*   < > 19*   BUN mg/dL 32*   < > 43*   CREATININE mg/dL 2 50*   < > 2 63*   CALCIUM mg/dL 8 2*   < > 8 2*   ALK PHOS U/L  --   --  91   ALT U/L  --   --  18   AST U/L  --   --  11    < > = values in this interval not displayed             * I Have Reviewed All Lab Data Listed Above  * Additional Pertinent Lab Tests Reviewed: All Labs Within Last 24 Hours Reviewed    Imaging:    Imaging Reports Reviewed Today Include:  None    Cultures:   Blood Culture:   Lab Results   Component Value Date    BLOODCX No Growth at 72 hrs  12/14/2019    BLOODCX Gram-Negative Rods Anaerobic (Group) (A) 12/09/2019    BLOODCX Gram-Negative Rods Anaerobic (Group) (A) 12/09/2019    BLOODCX No Growth After 5 Days  10/01/2019    BLOODCX No Growth After 5 Days  10/01/2019    BLOODCX No Growth After 5 Days  11/16/2017    BLOODCX No Growth After 5 Days   09/13/2017     Urine Culture:   Lab Results   Component Value Date    URINECX (A) 12/09/2019     >100,000 cfu/ml Vancomycin Resistant Enterococcus faecium    URINECX (A) 12/09/2019     1080-7997 cfu/ml Gram-negative cintia- species    URINECX (A) 11/24/2019     >100,000 cfu/ml Vancomycin Resistant Enterococcus faecium    URINECX No Growth <1000 cfu/mL 11/05/2019    URINECX <10,000 cfu/ml  11/04/2019    URINECX >100,000 cfu/ml Escherichia coli (A) 10/23/2019    URINECX 10,000-19,000 cfu/ml  10/23/2019     Sputum Culture: No components found for: SPUTUMCX  Wound Culture: No results found for: WOUNDCULT    Last 24 Hours Medication List:     Current Facility-Administered Medications:  acetaminophen 650 mg Oral Q6H PRN Kourtney Ortiz PA-C   aluminum-magnesium hydroxide-simethicone 30 mL Oral Q4H PRN Kourtney Ortiz PA-C   amLODIPine 10 mg Oral Daily Melissa Lyle MD   ARIPiprazole 30 mg Oral HS [facility], [facility]   aspirin 81 mg Oral Daily Abel Haw, DO   busPIRone 5 mg Oral BID Abel Haw, DO   cholecalciferol 3,000 Units Oral Daily Abel Haw, DO   cloNIDine 0 2 mg Oral Q8H [address] Karma Brennan MD   dicyclomine 10 mg Oral TID PRN Felisa Tavarez MD   doxazosin 2 mg Oral HS Karma Brennan MD   DULoxetine 60 mg Oral BID Abel Haw, DO   ferrous sulfate 325 mg Oral Daily With Breakfast Abel Haw, DO   folic acid 7,671 mcg Oral Daily Carri Ramos, DO   heparin (porcine) 5,000 Units Subcutaneous [facility] Carri Ramos, DO   hydrALAZINE 5 mg Intravenous Q6H PRN Giuliano Vega MD   hydrALAZINE 50 mg Oral Q8H [facility] George Merrill MD   insulin lispro 1-5 Units Subcutaneous HS Carri Ramos, DO   insulin lispro 1-6 Units Subcutaneous TID AC Giuliano Vega MD   levothyroxine 125 mcg Oral Early Morning Carri Ramos, DO   loperamide 4 mg Oral TID PRN Giuliano Vega MD   LORazepam 2 mg Oral TID PRN Carri Ramos, DO   metoprolol tartrate 50 mg Oral Q12H [facility] Carri Ramos, DO   ondansetron 4 mg Intravenous Q4H PRN Jumana Escalante MD   pravastatin 80 mg Oral Daily Carri Ramos, DO   predniSONE 5 mg Oral Daily Carri Ramos, DO   rOPINIRole 0 25 mg Oral BID Carri Ramos, DO   saccharomyces boulardii 250 mg Oral BID Carri Ramos, DO   sertraline 200 mg Oral Daily Carri Ramos, DO   sevelamer 800 mg Oral TID With Meals Carri Ramos, DO   sodium bicarbonate 1,300 mg Oral BID after meals Luciano Prater MD   tacrolimus 2 mg Oral Daily Seamus uF, DO   tacrolimus 2 mg Oral HS Luciano Prater MD        Today, Patient Was Seen By: Sky Shipman MD    ** Please Note: Dragon 360 Dictation voice to text software may have been used in the creation of this document   **

## 2019-12-18 NOTE — ASSESSMENT & PLAN NOTE
· Creatinine has been relatively stable    Continue to monitor with serial laboratories  · Monitor volume status, not all UO documented  · Monitor BMP while inpatient  · Avoid nephrotoxins as able, renally dose medications as indicated  · May need to initiate some IV fluids if patient not eating or drinking today

## 2019-12-19 LAB
ANION GAP SERPL CALCULATED.3IONS-SCNC: 7 MMOL/L (ref 4–13)
BACTERIA BLD CULT: NORMAL
BUN SERPL-MCNC: 30 MG/DL (ref 5–25)
C DIFF TOX GENS STL QL NAA+PROBE: NEGATIVE
CALCIUM SERPL-MCNC: 8.1 MG/DL (ref 8.3–10.1)
CHLORIDE SERPL-SCNC: 117 MMOL/L (ref 100–108)
CO2 SERPL-SCNC: 18 MMOL/L (ref 21–32)
CREAT SERPL-MCNC: 2.56 MG/DL (ref 0.6–1.3)
ERYTHROCYTE [DISTWIDTH] IN BLOOD BY AUTOMATED COUNT: 16.8 % (ref 11.6–15.1)
GFR SERPL CREATININE-BSD FRML MDRD: 20 ML/MIN/1.73SQ M
GLUCOSE SERPL-MCNC: 103 MG/DL (ref 65–140)
GLUCOSE SERPL-MCNC: 115 MG/DL (ref 65–140)
GLUCOSE SERPL-MCNC: 124 MG/DL (ref 65–140)
GLUCOSE SERPL-MCNC: 133 MG/DL (ref 65–140)
GLUCOSE SERPL-MCNC: 148 MG/DL (ref 65–140)
HCT VFR BLD AUTO: 24.7 % (ref 34.8–46.1)
HGB BLD-MCNC: 7.7 G/DL (ref 11.5–15.4)
MCH RBC QN AUTO: 31.8 PG (ref 26.8–34.3)
MCHC RBC AUTO-ENTMCNC: 31.2 G/DL (ref 31.4–37.4)
MCV RBC AUTO: 102 FL (ref 82–98)
PLATELET # BLD AUTO: 172 THOUSANDS/UL (ref 149–390)
PMV BLD AUTO: 12.6 FL (ref 8.9–12.7)
POTASSIUM SERPL-SCNC: 3.5 MMOL/L (ref 3.5–5.3)
RBC # BLD AUTO: 2.42 MILLION/UL (ref 3.81–5.12)
SODIUM SERPL-SCNC: 142 MMOL/L (ref 136–145)
TACROLIMUS BLD-MCNC: 4.4 NG/ML (ref 2–20)
WBC # BLD AUTO: 6.59 THOUSAND/UL (ref 4.31–10.16)

## 2019-12-19 PROCEDURE — 82948 REAGENT STRIP/BLOOD GLUCOSE: CPT

## 2019-12-19 PROCEDURE — 80048 BASIC METABOLIC PNL TOTAL CA: CPT | Performed by: INTERNAL MEDICINE

## 2019-12-19 PROCEDURE — 99232 SBSQ HOSP IP/OBS MODERATE 35: CPT | Performed by: INTERNAL MEDICINE

## 2019-12-19 PROCEDURE — 85027 COMPLETE CBC AUTOMATED: CPT | Performed by: INTERNAL MEDICINE

## 2019-12-19 PROCEDURE — 97535 SELF CARE MNGMENT TRAINING: CPT

## 2019-12-19 RX ADMIN — MELATONIN 3000 UNITS: at 08:18

## 2019-12-19 RX ADMIN — HYDRALAZINE HYDROCHLORIDE 50 MG: 50 TABLET, FILM COATED ORAL at 21:08

## 2019-12-19 RX ADMIN — ASPIRIN 81 MG 81 MG: 81 TABLET ORAL at 08:18

## 2019-12-19 RX ADMIN — BUSPIRONE HYDROCHLORIDE 5 MG: 5 TABLET ORAL at 08:18

## 2019-12-19 RX ADMIN — SEVELAMER HYDROCHLORIDE 800 MG: 800 TABLET, FILM COATED PARENTERAL at 11:06

## 2019-12-19 RX ADMIN — FOLIC ACID 1000 MCG: 1 TABLET ORAL at 08:18

## 2019-12-19 RX ADMIN — SEVELAMER HYDROCHLORIDE 800 MG: 800 TABLET, FILM COATED PARENTERAL at 16:50

## 2019-12-19 RX ADMIN — DULOXETINE HYDROCHLORIDE 60 MG: 60 CAPSULE, DELAYED RELEASE ORAL at 08:17

## 2019-12-19 RX ADMIN — SEVELAMER HYDROCHLORIDE 800 MG: 800 TABLET, FILM COATED PARENTERAL at 08:17

## 2019-12-19 RX ADMIN — HYDRALAZINE HYDROCHLORIDE 50 MG: 50 TABLET, FILM COATED ORAL at 05:54

## 2019-12-19 RX ADMIN — Medication 250 MG: at 16:50

## 2019-12-19 RX ADMIN — ROPINIROLE 0.25 MG: 0.25 TABLET, FILM COATED ORAL at 16:50

## 2019-12-19 RX ADMIN — ROPINIROLE 0.25 MG: 0.25 TABLET, FILM COATED ORAL at 08:17

## 2019-12-19 RX ADMIN — SODIUM BICARBONATE 650 MG TABLET 1300 MG: at 16:48

## 2019-12-19 RX ADMIN — HYDRALAZINE HYDROCHLORIDE 50 MG: 50 TABLET, FILM COATED ORAL at 13:24

## 2019-12-19 RX ADMIN — METOPROLOL TARTRATE 50 MG: 50 TABLET, FILM COATED ORAL at 21:08

## 2019-12-19 RX ADMIN — LORAZEPAM 2 MG: 1 TABLET ORAL at 13:24

## 2019-12-19 RX ADMIN — DULOXETINE HYDROCHLORIDE 60 MG: 60 CAPSULE, DELAYED RELEASE ORAL at 16:49

## 2019-12-19 RX ADMIN — Medication 250 MG: at 08:17

## 2019-12-19 RX ADMIN — PRAVASTATIN SODIUM 80 MG: 80 TABLET ORAL at 08:17

## 2019-12-19 RX ADMIN — ACETAMINOPHEN 650 MG: 325 TABLET ORAL at 08:17

## 2019-12-19 RX ADMIN — FERROUS SULFATE TAB 325 MG (65 MG ELEMENTAL FE) 325 MG: 325 (65 FE) TAB at 08:18

## 2019-12-19 RX ADMIN — ARIPIPRAZOLE 30 MG: 10 TABLET ORAL at 21:02

## 2019-12-19 RX ADMIN — CLONIDINE HYDROCHLORIDE 0.2 MG: 0.1 TABLET ORAL at 05:52

## 2019-12-19 RX ADMIN — HEPARIN SODIUM 5000 UNITS: 5000 INJECTION INTRAVENOUS; SUBCUTANEOUS at 13:24

## 2019-12-19 RX ADMIN — HEPARIN SODIUM 5000 UNITS: 5000 INJECTION INTRAVENOUS; SUBCUTANEOUS at 21:03

## 2019-12-19 RX ADMIN — TACROLIMUS 2 MG: 1 CAPSULE ORAL at 08:18

## 2019-12-19 RX ADMIN — DOXAZOSIN 2 MG: 2 TABLET ORAL at 21:07

## 2019-12-19 RX ADMIN — AMLODIPINE BESYLATE 10 MG: 10 TABLET ORAL at 08:17

## 2019-12-19 RX ADMIN — HEPARIN SODIUM 5000 UNITS: 5000 INJECTION INTRAVENOUS; SUBCUTANEOUS at 05:54

## 2019-12-19 RX ADMIN — SODIUM BICARBONATE 650 MG TABLET 1300 MG: at 08:17

## 2019-12-19 RX ADMIN — SERTRALINE HYDROCHLORIDE 200 MG: 100 TABLET ORAL at 08:17

## 2019-12-19 RX ADMIN — TACROLIMUS 2 MG: 1 CAPSULE ORAL at 21:02

## 2019-12-19 RX ADMIN — CLONIDINE HYDROCHLORIDE 0.2 MG: 0.1 TABLET ORAL at 13:24

## 2019-12-19 RX ADMIN — PREDNISONE 5 MG: 5 TABLET ORAL at 08:19

## 2019-12-19 RX ADMIN — BUSPIRONE HYDROCHLORIDE 5 MG: 5 TABLET ORAL at 16:49

## 2019-12-19 RX ADMIN — DICYCLOMINE HYDROCHLORIDE 10 MG: 10 CAPSULE ORAL at 08:17

## 2019-12-19 RX ADMIN — METOPROLOL TARTRATE 50 MG: 50 TABLET, FILM COATED ORAL at 08:18

## 2019-12-19 RX ADMIN — CLONIDINE HYDROCHLORIDE 0.2 MG: 0.1 TABLET ORAL at 21:08

## 2019-12-19 RX ADMIN — LEVOTHYROXINE SODIUM 125 MCG: 125 TABLET ORAL at 05:52

## 2019-12-19 NOTE — PROGRESS NOTES
Da Fraser 1965, 53 y o  female MRN: [id number]     Unit/Bed#: -01 Encounter: [id number]     Primary Care Provider: Jatinder Carter MD   Date and time admitted to hospital: 12/9/2019  8:29 PM           Bacteremia  Assessment & Plan  · 2/2 BC from admission growing anaerobes  · Was started on ceftriaxone and Flagyl by ID now ceftriaxone discontinued and continue Flagyl with plan of total 7 day course which was discontinued on 12/16/2019  · Source is suspected to be GI secondary to colitis with translocation, ID reviewed the new symptoms  CDIff reordered and is again negative  After looking at the x-ray from yesterday I suspect that she just had a large stool burden with crampy abdominal pain and residual mucoid material   I agree with ID that there may be some lactose component to this and so restriction a products is logical   · Repeat BC from 12/14 neg so far  · Continue to monitor off antibiotics     Acute metabolic encephalopathy  Assessment & Plan  Patient back to baseline  Neuropsychiatric evaluation shows she has capacity for decision making  Remains cognitively intact  Crampy abdominal pain and loose stooling from yesterday has resolved  Continue to monitor     Diarrhea  Assessment & Plan  · Persistent since last admission; C diff during at recent admission at [facility] was negative  Id feels this may have been a viral gastroenteritis  · CT abdomen/pelvis with evidence of colitis of proximal to mid sigmoid and distal left colon  Patient has generalized abdominal discomfort but nothing focal   Flat plate of the abdomen showed normal bowel gas pattern  I suspect this was mild crampy abdominal discomfort passing large stool burden  · Final diagnosis appears to be a viral gastroenteritis    Continuing to observe patient off of metronidazole      Hypokalemia  Assessment & Plan  GI loss   repleted  Serial laboratories to monitor      * Enterocolitis  Assessment & Plan  · With history of recurrent UTIs in setting of renal and pancreatic transplant  Most recently with VRE at recent admission to [employer] [facility]  · Presenting now with abdominal pain/nausea and lethargy/confusion  · CT abdomen/pelvis showing colitis  · With positive BC with anaerobe, started on rocephin & flagyl per ID, deescalated to only Flagyl  · Stool cdiff neg, enteric panel neg, leucocytes neg  · procalcitonin 0 3  · Trend WBC, temperature curve  · Checked CMV PCR - negative  · Abdominal pain improved still with mucoid material   Agree with ID this likely should be monitored conservatively without antibiotic therapy  Avoidance of lactose may be helpful  Agree that in the future if she does not improve the Questran could be added      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 [facility])  Assessment & Plan        Lab Results   Component Value Date     HGBA1C 5 1 09/09/2019                Recent Labs     12/18/19  1641 12/18/19  2048 12/19/19  0657 12/19/19  1042   POCGLU 124 109 124 115         Blood Sugar Average: Last 72 hrs:  · (P) 226 [id number] stopped Lantus (in place of Toujeo) 5 units qHS comma will continue to monitor  · Cont SSI with accu-cheks and carb controlled diet  · Initiate hypoglycemia protocol and avoid hypoglycemia     Essential hypertension  Assessment & Plan  Blood pressures have remained variable  Patient currently on clonidine t i d  And Cardura  Norvasc was discontinued  Continue to monitor  Blood pressures are now trending upwards  May need to really add low-dose Norvasc  Will continue to monitor and decide      Multiple myeloma not having achieved remission [facility])  Assessment & Plan  · Follows with Dr Alyssa Luna as outpatient  · Continue Revlimid at home dosage  · Monitor hb, running low  · H/o transfusions, hemoglobin has remained stable at 7 6  Will continue hold on transfusion as she has multiple autoantibodies noted  Patient remains stable in the mid sevens    Will have her follow up shortly after discharge from hospital      Chronic kidney disease, stage 4 (severe) ([facility])  Assessment & Plan  · Creatinine has been relatively stable  Continue to monitor with serial laboratories  · Monitor volume status, not all UO documented  · Monitor BMP while inpatient  · Avoid nephrotoxins as able, renally dose medications as indicated  · Patient back eating and drinking no need for IV fluids     Renal transplant recipient  Assessment & Plan  · On chronic immunosuppression with tacrolimus and prednisone  · Unfortunately patient noncompliant with all medications since discharge including these  · Tacrolimus level was 4 > 10 > 7 (12/12) > 5 (12/14) remains stable at 4 4  · dose decreased to 2 BID on 12/12, again decrease to 2 mg QAM & 1 5 mg HS from 12/14  · Tacrolimus level was stable on the current dosing  · Per nephro today - level of 5 was appropriate so increased tac back to 2 mg BID at home dose  Tacrolimus level 4 4 slightly below goal   Nephrology recommending continued dosing of 2 mg b i d      Metabolic acidosis  Assessment & Plan  · Appears acute on chronic  · S/P kidney and pancreatic transplant in 1998  · continue p o  and trend BMP  · Nephrology note appreciated  Patient with an RTA related to her pancreatic transplant  Continue to monitor      Asymptomatic bacteriuria  Assessment & Plan  · Recently completed 7 D course of IV daptomycin for VRE UTI  Monitoring off antibiotic therapy  · Has positive cultures for VRE again, but this is not the cause of her symptoms, its colonization  · Monitor off Abx  · ID on board              VTE Pharmacologic Prophylaxis:   Pharmacologic: Heparin  Mechanical: Mechanical VTE prophylaxis in place      Patient Centered Rounds: I have performed  rounds with nursing staff today    Discussions with Specialists or Other Care Team Provider:  Reviewed Nephrology note  Education and Discussions with Family / Patient:  Updated patient  Time Spent for Care: 30 minutes  If More than 50% of total time spent on counseling and coordination of care as described above indicate yes or no and described the counseling and coordination:  No     Current Length of Stay: 9 day(s)  Current Patient Status: Inpatient   Certification Statement: The patient will continue to require additional inpatient hospital stay due to Stabilizing GI status and looking for rehab     Discharge Plan:  Hopeful for next 24-48 hours  Code Status: Level 1 - Full Code     Subjective:   Feeling much better today able to eat and drink  Less abdominal discomfort although she states when she flexes forward she is having some discomfort but not like yesterday  No new issues    Continue to have some soft stooling overnight      Objective:   Vitals:   Temp (24hrs), Av 3 °F (36 8 °C), Min:98 2 °F (36 8 °C), Max:98 3 °F (36 8 °C)     Temp:  [98 2 °F (36 8 °C)-98 3 °F (36 8 °C)] 98 3 °F (36 8 °C)  HR:  [61-73] 61  Resp:  [15-16] 16  BP: (149-168)/(55-59) 159/56  SpO2:  [96 %] 96 %  Body mass index is 36 14 kg/m²       Input and Output Summary (last 24 hours):         Intake/Output Summary (Last 24 hours) at 2019 1632  Last data filed at 2019 1517      Gross per 24 hour   Intake 600 ml   Output 1400 ml   Net -800 ml         Physical Exam:  Generally well-developed obese female no acute distress normocephalic atraumatic pupils equal round reactive to light extraocular muscles intact mucous membranes are moist neck is supple there is no JVD no lymph nodes no carotid bruits chest is decreased but clear to auscultation is no rhonchi rales or wheezes  Cardiovascular regular rate rhythm positive S1 and S2 heard appreciate an S3-S4 murmur or gallop  Abdomen is obese soft tender in a diffuse better without guarding or rebound  Neurologically patient awake alert oriented cranial nerves 2-12 appear to be intact     Physical Exam     Additional Data:   Labs:          Results from last 7 days   Lab Units 12/19/19  0838   12/16/19  0522 12/15/19  0431   WBC Thousand/uL 6 59   < > 6 86 7 02   HEMOGLOBIN g/dL 7 7*   < > 7 8* 7 3*   HEMATOCRIT % 24 7*   < > 25 4* 23 9*   PLATELETS Thousands/uL 172   < > 166 154   NEUTROS PCT %  --   --   --  58   LYMPHS PCT %  --   --   --  27   LYMPHO PCT %  --   --  17  --    MONOS PCT %  --   --   --  8   MONO PCT %  --   --  3*  --    EOS PCT %  --   --  4 4    < > = values in this interval not displayed              Results from last 7 days   Lab Units 12/19/19  0838   12/15/19  0431   POTASSIUM mmol/L 3 5   < > 3 4*   CHLORIDE mmol/L 117*   < > 115*   CO2 mmol/L 18*   < > 19*   BUN mg/dL 30*   < > 43*   CREATININE mg/dL 2 56*   < > 2 63*   CALCIUM mg/dL 8 1*   < > 8 2*   ALK PHOS U/L  --   --  91   ALT U/L  --   --  18   AST U/L  --   --  11    < > = values in this interval not displayed              * I Have Reviewed All Lab Data Listed Above  * Additional Pertinent Lab Tests Reviewed: All Labs Within Last 24 Hours Reviewed     Imaging:     Imaging Reports Reviewed Today Include:  None     Cultures:   Blood Culture:         Lab Results   Component Value Date     BLOODCX No Growth After 5 Days  12/14/2019     BLOODCX Porphyromonas asaccharolytica (A) 12/09/2019     BLOODCX Porphyromonas asaccharolytica (A) 12/09/2019     BLOODCX No Growth After 5 Days  10/01/2019     BLOODCX No Growth After 5 Days  10/01/2019     BLOODCX No Growth After 5 Days  11/16/2017     BLOODCX No Growth After 5 Days   09/13/2017      Urine Culture:          Lab Results   Component Value Date     URINECX (A) 12/09/2019       >100,000 cfu/ml Vancomycin Resistant Enterococcus faecium     URINECX (A) 12/09/2019       2713-7957 cfu/ml Gram-negative cintia- species     URINECX (A) 11/24/2019       >100,000 cfu/ml Vancomycin Resistant Enterococcus faecium     URINECX No Growth <1000 cfu/mL 11/05/2019     URINECX <10,000 cfu/ml  11/04/2019     URINECX >100,000 cfu/ml Escherichia coli (A) 10/23/2019     URINECX 10,000-19,000 cfu/ml  10/23/2019      Sputum Culture: No components found for: SPUTUMCX  Wound Culture: No results found for: WOUNDCULT     Last 24 Hours Medication List:      Current Facility-Administered Medications:  acetaminophen 650 mg Oral Q6H PRN Kourtney Ortiz PA-C   aluminum-magnesium hydroxide-simethicone 30 mL Oral Q4H PRN Kourtney Ortiz PA-C   amLODIPine 10 mg Oral Daily Gloria Queen MD   ARIPiprazole 30 mg Oral HS Shannan Hylan, DO   aspirin 81 mg Oral Daily Shannan Hylan, DO   busPIRone 5 mg Oral BID Shannan Hykaiser, DO   cholecalciferol 3,000 Units Oral Daily Shannan Hykaiser, DO   cloNIDine 0 2 mg Oral Q8H [address] Tamela Bautista MD   dicyclomine 10 mg Oral TID PRN Gloria Queen MD   doxazosin 2 mg Oral HS Tamela Bautista MD   DULoxetine 60 mg Oral BID Shannan Hykaiser, DO   ferrous sulfate 325 mg Oral Daily With Breakfast Shannan Hykaiser, DO   folic acid 1,709 mcg Oral Daily Shannan Hykaiser, DO   heparin (porcine) 5,000 Units Subcutaneous [facility] Shannan Lagunas, DO   hydrALAZINE 5 mg Intravenous Q6H PRN Gloria Queen MD   hydrALAZINE 50 mg Oral Q8H [address] Tamela Bautista MD   insulin lispro 1-5 Units Subcutaneous HS Shannan Hylan, DO   insulin lispro 1-6 Units Subcutaneous TID AC Gloria Queen MD   levothyroxine 125 mcg Oral Early Morning Shannan Hylan, DO   LORazepam 2 mg Oral TID PRN Shannan Hylan, DO   metoprolol tartrate 50 mg Oral Q12H [address] Shannan Hylan, DO   ondansetron 4 mg Intravenous Q4H PRN Apryl Morris MD   pravastatin 80 mg Oral Daily Shannan Hylan, DO   predniSONE 5 mg Oral Daily Shannan Hylan, DO   rOPINIRole 0 25 mg Oral BID Shannan Hylan, DO   saccharomyces boulardii 250 mg Oral BID Shannan Hylan, DO   sertraline 200 mg Oral Daily Shannan Hylan, DO   sevelamer 800 mg Oral TID With Meals Shannan Hykaiser, DO   sodium bicarbonate 1,300 mg Oral BID after meals Haskel Macleod, MD   tacrolimus 2 mg Oral Daily Seamus Fu DO   tacrolimus 2 mg Oral HS Lazaro Méndez MD         Today, Patient Was Seen By: Jerry Amador MD     ** Please Note: Dragon 360 Dictation voice to text software may have been used in the creation of this document   **

## 2019-12-19 NOTE — ASSESSMENT & PLAN NOTE
· Follows with Dr Antonia Zhou as outpatient  · Continue Revlimid at home dosage  · Monitor hb, running low  · H/o transfusions, hemoglobin has remained stable at 7 6  Will continue hold on transfusion as she has multiple autoantibodies noted  Patient remains stable in the mid sevens    Will have her follow up shortly after discharge from hospital

## 2019-12-19 NOTE — ASSESSMENT & PLAN NOTE
· Creatinine has been relatively stable    Continue to monitor with serial laboratories  · Monitor volume status, not all UO documented  · Monitor BMP while inpatient  · Avoid nephrotoxins as able, renally dose medications as indicated  · Patient back eating and drinking no need for IV fluids

## 2019-12-19 NOTE — SOCIAL WORK
Pt reviewed with SLIM provider  Pt not medically cleared  Pt having pain and vomiting  [address] and [address] following pt, CM sent message to facility

## 2019-12-19 NOTE — PROGRESS NOTES
NEPHROLOGY PROGRESS NOTE    Shelly Leggett 53 y o  female MRN: [id number]  Unit/Bed#: -01 Encounter: [id number]  Reason for Consult:  Chronic kidney disease and renal transplant    The patient is sitting up in a chair eating says today as a good day  She is no longer emboli diarrhea but she is passing mucus in her stool  No abdominal pain no nausea she is eating without any vomiting or discomfort  ASSESSMENT/PLAN:  1  Renal    Patient's chronic kidney disease in her renal transplant with baseline creatinine around 2 5 the patient has been stable at that level  Her immunosuppression consists of tacrolimus and prednisone her tacrolimus trough level was in the appropriate range  Continue oral bicarbonate for chronic metabolic acidosis due to bicarb wasting in the urine from her pancreas transplant  Continue tacrolimus at 2 mg every 12 hours, prednisone 5 mg a day  Monitor renal function and volume status    2  Diarrhea    Felt due to enterocolitis  She seemed to have a flare yesterday but today she states she just plasty mucous and feels better and is no longer nauseous this morning  Infectious Disease has been following as well  3  Multiple myeloma  Hematology oncology follows as outpatient receives chemotherapy  4  Frequent UTI  Most recent with VRE status post 7 days of daptomycin  5  PT/OT  Awaiting rehab placement per patient  SUBJECTIVE:  Review of Systems   Constitution: Negative for chills, decreased appetite, fever and malaise/fatigue  HENT: Negative  Eyes: Negative  Cardiovascular: Negative  Negative for chest pain, dyspnea on exertion, leg swelling and orthopnea  Respiratory: Negative  Negative for cough, shortness of breath, sputum production and wheezing  Gastrointestinal: Negative for bloating, abdominal pain, anorexia, nausea and vomiting  Passing mucus     Genitourinary: Negative for bladder incontinence, dysuria, hematuria and incomplete emptying  Neurological: Negative  Psychiatric/Behavioral: Negative  OBJECTIVE:  Current Weight: Weight - Scale: 108 kg (237 lb 10 5 oz)  Vitals:Temp (24hrs), Av 2 °F (36 8 °C), Min:98 2 °F (36 8 °C), Max:98 2 °F (36 8 °C)  Current: Temperature: 98 2 °F (36 8 °C)   Blood pressure 149/55, pulse 73, temperature 98 2 °F (36 8 °C), resp  rate 15, height 5' 8" (1 727 m), weight 108 kg (237 lb 10 5 oz), SpO2 95 %  , Body mass index is 36 14 kg/m²  Intake/Output Summary (Last 24 hours) at 2019 0816  Last data filed at 2019 1747  Gross per 24 hour   Intake 300 ml   Output 1 ml   Net 299 ml       Physical Exam: /55   Pulse 73   Temp 98 2 °F (36 8 °C)   Resp 15   Ht 5' 8" (1 727 m)   Wt 108 kg (237 lb 10 5 oz)   LMP  (LMP Unknown)   SpO2 95%   BMI 36 14 kg/m²   Physical Exam   Constitutional: She is oriented to person, place, and time  Non-toxic appearance  She does not appear ill  No distress  HENT:   Head: Atraumatic  Mouth/Throat: Oropharynx is clear and moist    Eyes: EOM are normal  No scleral icterus  Cardiovascular: Normal rate and regular rhythm  Exam reveals no gallop and no friction rub  Pulmonary/Chest: Breath sounds normal  No respiratory distress  She has no wheezes  She has no rhonchi  She has no rales  Abdominal: Soft  Normal appearance and bowel sounds are normal  She exhibits no distension  There is no tenderness  Neurological: She is alert and oriented to person, place, and time  Skin: Skin is warm and dry  No rash noted  No erythema  Psychiatric: She has a normal mood and affect         Medications:    Current Facility-Administered Medications:     acetaminophen (TYLENOL) tablet 650 mg, 650 mg, Oral, Q6H PRN, Kourtney Ortiz PA-C, 650 mg at 19 1059    aluminum-magnesium hydroxide-simethicone (MYLANTA) 200-200-20 mg/5 mL oral suspension 30 mL, 30 mL, Oral, Q4H PRN, Kourtney E Held, PA-C, 30 mL at 19 0148    amLODIPine (NORVASC) tablet 10 mg, 10 mg, Oral, Daily, Melissa Lyle MD, 10 mg at 12/18/19 0938    ARIPiprazole (ABILIFY) tablet 30 mg, 30 mg, Oral, HS, Loistine Shearing, DO, 30 mg at 12/18/19 2145    aspirin chewable tablet 81 mg, 81 mg, Oral, Daily, Loistine Shearing, DO, 81 mg at 12/18/19 9740    busPIRone (BUSPAR) tablet 5 mg, 5 mg, Oral, BID, Loistine Shearing, DO, 5 mg at 12/18/19 1700    cholecalciferol (VITAMIN D3) tablet 3,000 Units, 3,000 Units, Oral, Daily, Loistine Shearing, DO, 3,000 Units at 12/18/19 1463    cloNIDine (CATAPRES) tablet 0 2 mg, 0 2 mg, Oral, Q8H [address], Vasquez Edmondson MD, 0 2 mg at 12/19/19 1643    dicyclomine (BENTYL) capsule 10 mg, 10 mg, Oral, TID PRN, Radha Perez MD, 10 mg at 12/18/19 1657    doxazosin (CARDURA) tablet 2 mg, 2 mg, Oral, HS, Vasquez Edmondson MD, 2 mg at 12/18/19 2159    DULoxetine (CYMBALTA) delayed release capsule 60 mg, 60 mg, Oral, BID, Loistine Shearing, DO, 60 mg at 12/18/19 1700    ferrous sulfate tablet 325 mg, 325 mg, Oral, Daily With Breakfast, Loistine Shearing, DO, 325 mg at 22/71/99 9119    folic acid (FOLVITE) tablet 1,000 mcg, 1,000 mcg, Oral, Daily, Loistine Shearing, DO, 1,000 mcg at 12/18/19 0940    heparin (porcine) subcutaneous injection 5,000 Units, 5,000 Units, Subcutaneous, Q8H [address], 5,000 Units at 12/19/19 0554 **AND** [CANCELED] Platelet count, , , Once, Loistine Shearing, DO    hydrALAZINE (APRESOLINE) injection 5 mg, 5 mg, Intravenous, Q6H PRN, Radha Perez MD, 5 mg at 12/16/19 1733    hydrALAZINE (APRESOLINE) tablet 50 mg, 50 mg, Oral, Q8H [address], Vasquez Edmondson MD, 50 mg at 12/19/19 0554    insulin lispro (HumaLOG) 100 units/mL subcutaneous injection 1-5 Units, 1-5 Units, Subcutaneous, HS, Dany Islasing, DO    insulin lispro (HumaLOG) 100 units/mL subcutaneous injection 1-6 Units, 1-6 Units, Subcutaneous, TID AC, 1 Units at 12/17/19 1208 **AND** Fingerstick Glucose (POCT), , , 4x Daily AC and at bedtime, Radha Perez MD    levothyroxine tablet 125 mcg, 125 mcg, Oral, Early Morning, [place], DO, 125 mcg at 12/19/19 5224    LORazepam (ATIVAN) tablet 2 mg, 2 mg, Oral, TID PRN, [place], DO, 2 mg at 12/16/19 1100    metoprolol tartrate (LOPRESSOR) tablet 50 mg, 50 mg, Oral, Q12H [facility], [place], DO, 50 mg at 12/18/19 2159    ondansetron [facility]) injection 4 mg, 4 mg, Intravenous, Q4H PRN, Ashok Pineda MD, 4 mg at 12/18/19 2152    pravastatin (PRAVACHOL) tablet 80 mg, 80 mg, Oral, Daily, [place], DO, 80 mg at 12/18/19 0940    predniSONE tablet 5 mg, 5 mg, Oral, Daily, [place], DO, 5 mg at 12/18/19 4770    rOPINIRole (REQUIP) tablet 0 25 mg, 0 25 mg, Oral, BID, [place], DO, 0 25 mg at 12/18/19 1700    saccharomyces boulardii (FLORASTOR) capsule 250 mg, 250 mg, Oral, BID, [place], DO, 250 mg at 12/18/19 1700    sertraline (ZOLOFT) tablet 200 mg, 200 mg, Oral, Daily, [place], DO, 200 mg at 12/18/19 4552    sevelamer (RENAGEL) tablet 800 mg, 800 mg, Oral, TID With Meals, [place], DO, 800 mg at 12/18/19 1658    sodium bicarbonate tablet 1,300 mg, 1,300 mg, Oral, BID after meals, Chevy Taylor MD, 1,300 mg at 12/18/19 1657    tacrolimus (PROGRAF) capsule 2 mg, 2 mg, Oral, Daily, Seamus Fu, DO, 2 mg at 12/18/19 0941    tacrolimus (PROGRAF) capsule 2 mg, 2 mg, Oral, HS, Chevy Taylor MD, 2 mg at 12/18/19 2146    Laboratory Results:  Lab Results   Component Value Date    WBC 6 12 12/18/2019    HGB 7 6 (L) 12/18/2019    HCT 24 9 (L) 12/18/2019     (H) 12/18/2019     12/18/2019     Lab Results   Component Value Date    SODIUM 142 12/18/2019    K 3 3 (L) 12/18/2019     (H) 12/18/2019    CO2 16 (L) 12/18/2019    BUN 32 (H) 12/18/2019    CREATININE 2 55 (H) 12/18/2019    GLUC 120 12/18/2019    CALCIUM 8 4 12/18/2019     Lab Results   Component Value Date    CALCIUM 8 4 12/18/2019    PHOS 4 7 (H) 12/04/2019     No results found for: LABPROT

## 2019-12-19 NOTE — PROGRESS NOTES
Progress Note - Infectious Disease   Christine Saldivar 53 y o  female MRN: [id number]  Unit/Bed#: -01 Encounter: [id number]      Impression/Recommendations:  1   Anaerobic Gram-negative bacteremia  [facility] GI translocation in setting of #2   CT A/P otherwise negative   Clinically stable without sepsis   Clinically improved prior to initiation of antibiotics   Status post 5 days of Flagyl complicated by severe GI side effects  Rec:  ? Continue to follow closely off antibiotics  ? 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 Continue to follow closely off antibiotics  ? Follow stool output closely  ? Serial abdominal exams  ? Supportive care as per the primary service  ? Cautious intake of milk products  ? If continue to have bothersome diarrhea, consider adding cholestyramine     3   Asymptomatic bacteriuria   Seen on UA   Likely chronically colonized  Gail Jacobsen active symptoms of UTI   No treatment indicated      4   Recent VRE UTI   Status post 7 days daptomycin with clinical improvement     5   History of renal/pancreatic transplant   1998   Has pancreatic secretions draining in to bladder   On chronic immunosuppression      6   CKD   Stable at baseline creatinine 2  5      7   MM   Recently started on Revlimid      The patient is stable from an ID standpoint      Antibiotics:  Off antibiotics #3    Subjective:  Patient seen on AM rounds  Feels better  Has clear liquid stool with some mucus  Eating lunch  No N/V  Abdominal muscles "sore"      24 Hour Events:  No documented fevers, chills, sweats, nausea, vomiting, or diarrhea  Objective:  Vitals:  Temp:  [98 2 °F (36 8 °C)] 98 2 °F (36 8 °C)  HR:  [73] 73  Resp:  [15] 15  BP: (149-168)/(55-59) 149/55  Temp (24hrs), Av 2 °F (36 8 °C), Min:98 2 °F (36 8 °C), Max:98 2 °F (36 8 °C)  Current: Temperature: 98 2 °F (36 8 °C)    Physical Exam:   General:  No acute distress  Psychiatric:  Awake and alert  Pulmonary:  Normal respiratory excursion without accessory muscle use  Abdomen:  Soft, nontender  Extremities:  No edema  Skin:  No rashes    Lab Results:  I have personally reviewed pertinent labs  Results from last 7 days   Lab Units 19  0838 19  0845 19  0855  12/15/19  0431 19  0711 19  0555   POTASSIUM mmol/L 3 5 3 3* 3 3*   < > 3 4* 4 5 3 4*   CHLORIDE mmol/L 117* 117* 116*   < > 115* 114* 114*   CO2 mmol/L 18* 16* 17*   < > 19* 18* 19*   BUN mg/dL 30* 32* 32*   < > 43* 45* 40*   CREATININE mg/dL 2 56* 2 55* 2 50*   < > 2 63* 2 76* 2 58*   EGFR ml/min/1 73sq m 20 21 21   < > 20 19 20   CALCIUM mg/dL 8 1* 8 4 8 2*   < > 8 2* 8 1* 7 9*   AST U/L  --   --   --   --  11 27 15   ALT U/L  --   --   --   --  18 17 16   ALK PHOS U/L  --   --   --   --  91 88 91    < > = values in this interval not displayed  Results from last 7 days   Lab Units 19  0838 19  0909 19  0522   WBC Thousand/uL 6 59 6 12 6 86   HEMOGLOBIN g/dL 7 7* 7 6* 7 8*   PLATELETS Thousands/uL 172 163 166     Results from last 7 days   Lab Units 19  0823   BLOOD CULTURE  No Growth After 4 Days  Imaging Studies:   I have personally reviewed pertinent imaging study reports and images in PACS  AXR  increased colorectal stool, no obstruction    EKG, Pathology, and Other Studies:   I have personally reviewed pertinent reports

## 2019-12-19 NOTE — PLAN OF CARE
Problem: OCCUPATIONAL THERAPY ADULT  Goal: Performs self-care activities at highest level of function for planned discharge setting  See evaluation for individualized goals  Description  Treatment Interventions: ADL retraining, Functional transfer training, Endurance training, Cognitive reorientation, Patient/family training, Equipment evaluation/education, Compensatory technique education, Continued evaluation, Energy conservation, Activityengagement          See flowsheet documentation for full assessment, interventions and recommendations  Note:   Limitation: Decreased ADL status, Decreased cognition, Decreased endurance, Decreased Safe judgement during ADL, Decreased high-level ADLs  Prognosis: Fair  Assessment: Pt is seen for OT treatment session 12/19/19 including interventions to: increased endurance, improve functional mobility, improve dynamic standing balance  Upon therapist entry, pt seated upright in chair and agreeable to therapy  In comparison to previous sessions, pt demonstrating improvements with functional transfers, functional mobility and LB dressing  Considering pt's functional progress, will update pt's OT d/c recommendation to home with family support, increased assistance with IADLs, and 24/7 supervision  If family is unable to provide this level of assist/supervision, consider placement at a facility that can provide this level of support  No further acute OT needs, and OT orders to be d/c at this time  Recommend pt continue to participate in self care and functional mobility with staff while hospitalized  Please re-consult OT if medically and clinically appropriate       OT Discharge Recommendation: 24 hour supervision/assist  OT - OK to Discharge: Yes(when medically stable)

## 2019-12-19 NOTE — ASSESSMENT & PLAN NOTE
· 2/2 BC from admission growing anaerobes  · Was started on ceftriaxone and Flagyl by ID now ceftriaxone discontinued and continue Flagyl with plan of total 7 day course which was discontinued on 12/16/2019  · Source is suspected to be GI secondary to colitis with translocation, ID reviewed the new symptoms  CDIff reordered and is again negative    After looking at the x-ray from yesterday I suspect that she just had a large stool burden with crampy abdominal pain and residual mucoid material   I agree with ID that there may be some lactose component to this and so restriction a products is logical   Questran being added today for crampy new quite stools  · Repeat BC from 12/14 neg so far  · Continue to monitor off antibiotics

## 2019-12-19 NOTE — ASSESSMENT & PLAN NOTE
· With history of recurrent UTIs in setting of renal and pancreatic transplant  Most recently with VRE at recent admission to [facility]  · Presenting with abdominal pain/nausea and lethargy/confusion  · CT abdomen/pelvis showing colitis  · With positive BC with anaerobe, treated briefly with rocephin & flagyl per ID, deescalated to only Flagyl which was also subsequently discontinued  · Stool cdiff neg x2, enteric panel neg, leucocytes neg  · procalcitonin 0 3  · Trend WBC, temperature curve  · Checked CMV PCR - negative  · Abdominal pain improved still with mucoid material   Agree with ID this likely should be monitored conservatively without antibiotic therapy  Avoidance of lactose may be helpful    Questran being added due to continued mucoid material

## 2019-12-19 NOTE — OCCUPATIONAL THERAPY NOTE
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 ([facility] [address] )    Essential hypertension    [facility]    Acute metabolic encephalopathy    Metabolic acidosis    Diarrhea    Asymptomatic bacteriuria    Bacteremia       12/19/19 1059   Restrictions/Precautions   Weight Bearing Precautions Per Order No   Braces or Orthoses Other (Comment)  (shoes with inserts)   Other Precautions Cognitive; Fall Risk;Multiple lines   Lifestyle   Autonomy Pt reports being I with ADLS, IADLS and use of cane for ambulation PTA    Reciprocal Relationships Pt lives alone and reports that her family can assist as needed upon d/c    Service to Others Pt reports on disability   Intrinsic Gratification Pt reports enjoying crafts especially making jewelry and rugs   Pain Assessment   Pain Assessment No/denies pain   Pain Score No Pain   ADL   Grooming Assistance 5  Supervision/Setup   Grooming Deficit Brushing hair   LB Dressing Assistance 5  Supervision/Setup   LB Dressing Deficit Don/doff R shoe;Don/doff L shoe; Thread LLE into underwear; Thread RLE into underwear   LB Dressing Comments Pt donned b/l sneakers and brief with set-up  Pt is able to thread b/l LEs into brief while seated in recliner, and stands to pull over hips with supervision  Transfers   Sit to Stand 5  Supervision   Stand to Sit 5  Supervision   Additional Comments Functional transfers with use of rw  Doesn't require cues for safe hand placement   Functional Mobility   Functional Mobility 5  Supervision   Additional Comments Pt ambulated in hallway with use of rw and no major LOB  Fatigues after ambulating ~50 ft     Cognition   Overall Cognitive Status Impaired Arousal/Participation Alert; Responsive; Cooperative   Attention Within functional limits   Orientation Level Oriented X4   Memory Decreased recall of precautions;Decreased recall of recent events;Decreased short term memory   Following Commands Follows one step commands with increased time or repetition   Comments Pt requires increased time for processing, occassional cues for safety (especcially navigating more congested areas)  Recent ACLS demonstrated that pt needs 24/7 supervision   Activity Tolerance   Activity Tolerance Patient limited by fatigue   Assessment   Assessment Pt is seen for OT treatment session 12/19/19 including interventions to: increased endurance, improve functional mobility, improve dynamic standing balance  Upon therapist entry, pt seated upright in chair and agreeable to therapy  In comparison to previous sessions, pt demonstrating improvements with functional transfers, functional mobility and LB dressing  Considering pt's functional progress, will update pt's OT d/c recommendation to home with family support, increased assistance with IADLs, and 24/7 supervision  If family is unable to provide this level of assist/supervision, consider placement at a facility that can provide this level of support  No further acute OT needs, and OT orders to be d/c at this time  Recommend pt continue to participate in self care and functional mobility with staff while hospitalized  Please re-consult OT if medically and clinically appropriate     Recommendation   OT Discharge Recommendation 24 hour supervision/assist   Equipment Recommended Tub seat with back   OT - OK to Discharge Yes  (when medically stable)   Barthel Index   Feeding 10   Bathing 0   Grooming Score 5   Dressing Score 10   Bladder Score 0   Bowels Score 10   Toilet Use Score 5   Transfers (Bed/Chair) Score 10   Mobility (Level Surface) Score 0   Stairs Score 0   Barthel Index Score 50     Lesia Beard, MOT, OTR/L

## 2019-12-19 NOTE — DISCHARGE SUMMARY
Discharge- Anant Hernandez 1965, 53 y o  female MRN: [id number]    Unit/Bed#: -01 Encounter: [id number]    Primary Care Provider: Moi Muniz MD   Date and time admitted to hospital: 12/9/2019  8:29 PM        Bacteremia  Assessment & Plan  · 2/2 BC from admission growing anaerobes  · Was started on ceftriaxone and Flagyl by ID now ceftriaxone discontinued and continue Flagyl with plan of total 7 day course which was discontinued on 12/16/2019  · Source is suspected to be GI secondary to colitis with translocation, ID reviewed the new symptoms  CDIff reordered and is again negative  After looking at the x-ray from yesterday I suspect that she just had a large stool burden with crampy abdominal pain and residual mucoid material   I agree with ID that there may be some lactose component to this and so restriction a products is logical   · Repeat BC from 12/14 neg so far  · Continue to monitor off antibiotics    Acute metabolic encephalopathy  Assessment & Plan  Patient back to baseline  Neuropsychiatric evaluation shows she has capacity for decision making  Remains cognitively intact  Crampy abdominal pain and loose stooling from yesterday has resolved  Continue to monitor    Diarrhea  Assessment & Plan  · Persistent since last admission; C diff during at recent admission at [facility] was negative  Id feels this may have been a viral gastroenteritis  · CT abdomen/pelvis with evidence of colitis of proximal to mid sigmoid and distal left colon  Patient has generalized abdominal discomfort but nothing focal   Flat plate of the abdomen showed normal bowel gas pattern  I suspect this was mild crampy abdominal discomfort passing large stool burden  · Final diagnosis appears to be a viral gastroenteritis  Continuing to observe patient off of metronidazole  Hypokalemia  Assessment & Plan  GI loss   repleted  Serial laboratories to monitor      * Enterocolitis  Assessment & Plan  · With history of recurrent UTIs in setting of renal and pancreatic transplant  Most recently with VRE at recent admission to [facility]  · Presenting now with abdominal pain/nausea and lethargy/confusion  · CT abdomen/pelvis showing colitis  · With positive BC with anaerobe, started on rocephin & flagyl per ID, deescalated to only Flagyl  · Stool cdiff neg, enteric panel neg, leucocytes neg  · procalcitonin 0 3  · Trend WBC, temperature curve  · Checked CMV PCR - negative  · Abdominal pain improved still with mucoid material   Agree with ID this likely should be monitored conservatively without antibiotic therapy  Avoidance of lactose may be helpful  Agree that in the future if she does not improve the Questran could be added  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 [facility])  Assessment & Plan  Lab Results   Component Value Date    HGBA1C 5 1 09/09/2019       Recent Labs     12/18/19  1641 12/18/19  2048 12/19/19  0657 12/19/19  1042   POCGLU 124 109 124 115       Blood Sugar Average: Last 72 hrs:  · (P) 987 [id number] stopped Lantus (in place of Toujeo) 5 units qHS comma will continue to monitor  · Cont SSI with accu-cheks and carb controlled diet  · Initiate hypoglycemia protocol and avoid hypoglycemia    Essential hypertension  Assessment & Plan  Blood pressures have remained variable  Patient currently on clonidine t i d  And Cardura  Norvasc was discontinued  Continue to monitor  Blood pressures are now trending upwards  May need to really add low-dose Norvasc  Will continue to monitor and decide  Multiple myeloma not having achieved remission [facility])  Assessment & Plan  · Follows with Dr Sarah Kirkland as outpatient  · Continue Revlimid at home dosage  · Monitor hb, running low  · H/o transfusions, hemoglobin has remained stable at 7 6  Will continue hold on transfusion as she has multiple autoantibodies noted  Patient remains stable in the mid sevens    Will have her follow up shortly after discharge from hospital     Chronic kidney disease, stage 4 (severe) ([facility])  Assessment & Plan  · Creatinine has been relatively stable  Continue to monitor with serial laboratories  · Monitor volume status, not all UO documented  · Monitor BMP while inpatient  · Avoid nephrotoxins as able, renally dose medications as indicated  · Patient back eating and drinking no need for IV fluids    Renal transplant recipient  Assessment & Plan  · On chronic immunosuppression with tacrolimus and prednisone  · Unfortunately patient noncompliant with all medications since discharge including these  · Tacrolimus level was 4 > 10 > 7 (12/12) > 5 (12/14) remains stable at 4 4  · dose decreased to 2 BID on 12/12, again decrease to 2 mg QAM & 1 5 mg HS from 12/14  · Tacrolimus level was stable on the current dosing  · Per nephro today - level of 5 was appropriate so increased tac back to 2 mg BID at home dose  Tacrolimus level 4 4 slightly below goal   Nephrology recommending continued dosing of 2 mg b i d  Metabolic acidosis  Assessment & Plan  · Appears acute on chronic  · S/P kidney and pancreatic transplant in 1998  · continue p o  and trend BMP  · Nephrology note appreciated  Patient with an RTA related to her pancreatic transplant  Continue to monitor  Asymptomatic bacteriuria  Assessment & Plan  · Recently completed 7 D course of IV daptomycin for VRE UTI  Monitoring off antibiotic therapy  · Has positive cultures for VRE again, but this is not the cause of her symptoms, its colonization  · Monitor off Abx  · ID on board          VTE Pharmacologic Prophylaxis:   Pharmacologic: Heparin  Mechanical: Mechanical VTE prophylaxis in place  Patient Centered Rounds: I have performed  rounds with nursing staff today  Discussions with Specialists or Other Care Team Provider:  Reviewed Nephrology note  Education and Discussions with Family / Patient:  Updated patient  Time Spent for Care: 30 minutes    If More than 50% of total time spent on counseling and coordination of care as described above indicate yes or no and described the counseling and coordination:  No    Current Length of Stay: 9 day(s)  Current Patient Status: Inpatient   Certification Statement: The patient will continue to require additional inpatient hospital stay due to Stabilizing GI status and looking for rehab    Discharge Plan:  Hopeful for next 24-48 hours  Code Status: Level 1 - Full Code    Subjective:   Feeling much better today able to eat and drink  Less abdominal discomfort although she states when she flexes forward she is having some discomfort but not like yesterday  No new issues  Continue to have some soft stooling overnight  Objective:   Vitals:   Temp (24hrs), Av 3 °F (36 8 °C), Min:98 2 °F (36 8 °C), Max:98 3 °F (36 8 °C)    Temp:  [98 2 °F (36 8 °C)-98 3 °F (36 8 °C)] 98 3 °F (36 8 °C)  HR:  [61-73] 61  Resp:  [15-16] 16  BP: (149-168)/(55-59) 159/56  SpO2:  [96 %] 96 %  Body mass index is 36 14 kg/m²  Input and Output Summary (last 24 hours):        Intake/Output Summary (Last 24 hours) at 2019 1632  Last data filed at 2019 1517  Gross per 24 hour   Intake 600 ml   Output 1400 ml   Net -800 ml       Physical Exam:  Generally well-developed obese female no acute distress normocephalic atraumatic pupils equal round reactive to light extraocular muscles intact mucous membranes are moist neck is supple there is no JVD no lymph nodes no carotid bruits chest is decreased but clear to auscultation is no rhonchi rales or wheezes  Cardiovascular regular rate rhythm positive S1 and S2 heard appreciate an S3-S4 murmur or gallop  Abdomen is obese soft tender in a diffuse better without guarding or rebound  Neurologically patient awake alert oriented cranial nerves 2-12 appear to be intact    Physical Exam    Additional Data:   Labs:  Results from last 7 days   Lab Units 19  0838  19  0522 12/15/19  0437 WBC Thousand/uL 6 59   < > 6 86 7 02   HEMOGLOBIN g/dL 7 7*   < > 7 8* 7 3*   HEMATOCRIT % 24 7*   < > 25 4* 23 9*   PLATELETS Thousands/uL 172   < > 166 154   NEUTROS PCT %  --   --   --  58   LYMPHS PCT %  --   --   --  27   LYMPHO PCT %  --   --  17  --    MONOS PCT %  --   --   --  8   MONO PCT %  --   --  3*  --    EOS PCT %  --   --  4 4    < > = values in this interval not displayed  Results from last 7 days   Lab Units 12/19/19  0838  12/15/19  0431   POTASSIUM mmol/L 3 5   < > 3 4*   CHLORIDE mmol/L 117*   < > 115*   CO2 mmol/L 18*   < > 19*   BUN mg/dL 30*   < > 43*   CREATININE mg/dL 2 56*   < > 2 63*   CALCIUM mg/dL 8 1*   < > 8 2*   ALK PHOS U/L  --   --  91   ALT U/L  --   --  18   AST U/L  --   --  11    < > = values in this interval not displayed  * I Have Reviewed All Lab Data Listed Above  * Additional Pertinent Lab Tests Reviewed: All Labs Within Last 24 Hours Reviewed    Imaging:    Imaging Reports Reviewed Today Include:  None    Cultures:   Blood Culture:   Lab Results   Component Value Date    BLOODCX No Growth After 5 Days  12/14/2019    BLOODCX Porphyromonas asaccharolytica (A) 12/09/2019    BLOODCX Porphyromonas asaccharolytica (A) 12/09/2019    BLOODCX No Growth After 5 Days  10/01/2019    BLOODCX No Growth After 5 Days  10/01/2019    BLOODCX No Growth After 5 Days  11/16/2017    BLOODCX No Growth After 5 Days   09/13/2017     Urine Culture:   Lab Results   Component Value Date    URINECX (A) 12/09/2019     >100,000 cfu/ml Vancomycin Resistant Enterococcus faecium    URINECX (A) 12/09/2019     7618-9390 cfu/ml Gram-negative cintia- species    URINECX (A) 11/24/2019     >100,000 cfu/ml Vancomycin Resistant Enterococcus faecium    URINECX No Growth <1000 cfu/mL 11/05/2019    URINECX <10,000 cfu/ml  11/04/2019    URINECX >100,000 cfu/ml Escherichia coli (A) 10/23/2019    URINECX 10,000-19,000 cfu/ml  10/23/2019     Sputum Culture: No components found for: SPUTUMCX  Wound Culture: No results found for: WOUNDCULT    Last 24 Hours Medication List:     Current Facility-Administered Medications:  acetaminophen 650 mg Oral Q6H PRN Kourtney Ortiz PA-C   aluminum-magnesium hydroxide-simethicone 30 mL Oral Q4H PRN Kourtney Ortiz PA-C   amLODIPine 10 mg Oral Daily Raul Chavez MD   ARIPiprazole 30 mg Oral HS Hoffman Alias, DO   aspirin 81 mg Oral Daily Hoffman Alias, DO   busPIRone 5 mg Oral BID Hoffman Alias, DO   cholecalciferol 3,000 Units Oral Daily Hoffman Alias, DO   cloNIDine 0 2 mg Oral Q8H [facility] Nilesh Howell MD   dicyclomine 10 mg Oral TID PRN Raul Chavez MD   doxazosin 2 mg Oral HS Nilesh Howell MD   DULoxetine 60 mg Oral BID Hoffman Alias, DO   ferrous sulfate 325 mg Oral Daily With Breakfast Hoffman Alias, DO   folic acid 1,770 mcg Oral Daily Hoffman Alias, DO   heparin (porcine) 5,000 Units Subcutaneous [facility] Hoffman Alias, DO   hydrALAZINE 5 mg Intravenous Q6H PRN Raul Chavez MD   hydrALAZINE 50 mg Oral Q8H [facility] Nilesh Howell MD   insulin lispro 1-5 Units Subcutaneous HS Hoffman Alias, DO   insulin lispro 1-6 Units Subcutaneous TID AC Raul Chavez MD   levothyroxine 125 mcg Oral Early Morning Hoffman Alias, DO   LORazepam 2 mg Oral TID PRN Hoffman Alias, DO   metoprolol tartrate 50 mg Oral Q12H [facility] Hoffman Alias, DO   ondansetron 4 mg Intravenous Q4H PRN Manjula Rivera MD   pravastatin 80 mg Oral Daily Hoffman Alias, DO   predniSONE 5 mg Oral Daily Hoffman Alias, DO   rOPINIRole 0 25 mg Oral BID Hoffman Alias, DO   saccharomyces boulardii 250 mg Oral BID Hoffman Alias, DO   sertraline 200 mg Oral Daily Hoffman Alias, DO   sevelamer 800 mg Oral TID With Meals Hoffman Alias, DO   sodium bicarbonate 1,300 mg Oral BID after meals Brendan Crandall MD   tacrolimus 2 mg Oral Daily Seamus Fu, DO   tacrolimus 2 mg Oral HS Brendan Crandall MD        Today, Patient Was Seen By: Kari Liang MD    ** Please Note: Yariel Taylor Dictation voice to text software may have been used in the creation of this document   **

## 2019-12-19 NOTE — RESTORATIVE TECHNICIAN NOTE
Restorative Specialist Mobility Note       Activity: Ambulate in barron, Ambulate in room, Bathroom privileges, Chair, Dangle, Stand at bedside(Educated/encouraged pt to ambulate with assistance 3-4 x's/day  Chair alarm on   Pt callbell, phone/tray within reach )     Assistive Device: Front wheel walker, Other (Comment)(B/L shoes on )    Ilda Daily BS, Restorative Technician, [employer]

## 2019-12-19 NOTE — ASSESSMENT & PLAN NOTE
Patient back to baseline  Neuropsychiatric evaluation shows she has capacity for decision making  Remains cognitively intact  Crampy abdominal pain and loose stooling from 12/18  She did state today at lunch however she still was having mucoid loose stools and occasional cramping which is resolving with Bentyl  Plan is to start some Questran to see if we can resolve this    Continue to monitor

## 2019-12-19 NOTE — ASSESSMENT & PLAN NOTE
· On chronic immunosuppression with tacrolimus and prednisone  · Unfortunately patient noncompliant with all medications since discharge including these  · Tacrolimus level was 4 > 10 > 7 (12/12) > 5 (12/14) remains stable at 4 4  · dose decreased to 2 BID on 12/12, again decrease to 2 mg QAM & 1 5 mg HS from 12/14  · Tacrolimus level was stable on the current dosing  · Per nephro today - level of 5 was appropriate so increased tac back to 2 mg BID at home dose  Tacrolimus level 4 4 slightly below goal   Nephrology recommending continued dosing of 2 mg b i d

## 2019-12-19 NOTE — ASSESSMENT & PLAN NOTE
Lab Results   Component Value Date    HGBA1C 5 1 09/09/2019       Recent Labs     12/18/19  1641 12/18/19  2048 12/19/19  0657 12/19/19  1042   POCGLU 124 109 124 115       Blood Sugar Average: Last 72 hrs:  · (P) 515 [id number] stopped Lantus (in place of Toujeo) 5 units qHS comma will continue to monitor    · Cont SSI with accu-cheks and carb controlled diet  · Initiate hypoglycemia protocol and avoid hypoglycemia

## 2019-12-19 NOTE — ASSESSMENT & PLAN NOTE
· Persistent since last admission; C diff during at recent admission at [facility] was negative  Id feels this may have been a viral gastroenteritis  · CT abdomen/pelvis with evidence of colitis of proximal to mid sigmoid and distal left colon  Still having intermittent crampy mucoid stools  ·   Final diagnosis appears to be a viral gastroenteritis  Continuing to observe patient off of metronidazole  Patient having mucoid stools    Will trial Questran twice daily

## 2019-12-19 NOTE — ASSESSMENT & PLAN NOTE
Blood pressures have remained variable  Patient currently on clonidine t i d  And Cardura  Norvasc was discontinued  Continue to monitor  Blood pressures are now trending upwards  May need to re- add low-dose Norvasc  Will continue to monitor and decide

## 2019-12-20 LAB
GLUCOSE SERPL-MCNC: 103 MG/DL (ref 65–140)
GLUCOSE SERPL-MCNC: 105 MG/DL (ref 65–140)
GLUCOSE SERPL-MCNC: 122 MG/DL (ref 65–140)
GLUCOSE SERPL-MCNC: 129 MG/DL (ref 65–140)

## 2019-12-20 PROCEDURE — 82948 REAGENT STRIP/BLOOD GLUCOSE: CPT

## 2019-12-20 PROCEDURE — 99232 SBSQ HOSP IP/OBS MODERATE 35: CPT | Performed by: INTERNAL MEDICINE

## 2019-12-20 RX ORDER — CHOLESTYRAMINE LIGHT 4 G/5.7G
4 POWDER, FOR SUSPENSION ORAL 2 TIMES DAILY WITH MEALS
Status: DISCONTINUED | OUTPATIENT
Start: 2019-12-20 | End: 2019-12-21

## 2019-12-20 RX ADMIN — BUSPIRONE HYDROCHLORIDE 5 MG: 5 TABLET ORAL at 09:52

## 2019-12-20 RX ADMIN — METOPROLOL TARTRATE 50 MG: 50 TABLET, FILM COATED ORAL at 09:52

## 2019-12-20 RX ADMIN — CLONIDINE HYDROCHLORIDE 0.2 MG: 0.1 TABLET ORAL at 13:05

## 2019-12-20 RX ADMIN — PRAVASTATIN SODIUM 80 MG: 80 TABLET ORAL at 09:53

## 2019-12-20 RX ADMIN — SEVELAMER HYDROCHLORIDE 800 MG: 800 TABLET, FILM COATED PARENTERAL at 13:04

## 2019-12-20 RX ADMIN — HYDRALAZINE HYDROCHLORIDE 50 MG: 50 TABLET, FILM COATED ORAL at 22:00

## 2019-12-20 RX ADMIN — ROPINIROLE 0.25 MG: 0.25 TABLET, FILM COATED ORAL at 17:37

## 2019-12-20 RX ADMIN — HEPARIN SODIUM 5000 UNITS: 5000 INJECTION INTRAVENOUS; SUBCUTANEOUS at 05:32

## 2019-12-20 RX ADMIN — BUSPIRONE HYDROCHLORIDE 5 MG: 5 TABLET ORAL at 17:37

## 2019-12-20 RX ADMIN — DULOXETINE HYDROCHLORIDE 60 MG: 60 CAPSULE, DELAYED RELEASE ORAL at 09:52

## 2019-12-20 RX ADMIN — ASPIRIN 81 MG 81 MG: 81 TABLET ORAL at 09:52

## 2019-12-20 RX ADMIN — FERROUS SULFATE TAB 325 MG (65 MG ELEMENTAL FE) 325 MG: 325 (65 FE) TAB at 09:53

## 2019-12-20 RX ADMIN — HEPARIN SODIUM 5000 UNITS: 5000 INJECTION INTRAVENOUS; SUBCUTANEOUS at 21:59

## 2019-12-20 RX ADMIN — ROPINIROLE 0.25 MG: 0.25 TABLET, FILM COATED ORAL at 09:52

## 2019-12-20 RX ADMIN — CLONIDINE HYDROCHLORIDE 0.2 MG: 0.1 TABLET ORAL at 05:34

## 2019-12-20 RX ADMIN — DOXAZOSIN 2 MG: 2 TABLET ORAL at 22:00

## 2019-12-20 RX ADMIN — HYDRALAZINE HYDROCHLORIDE 50 MG: 50 TABLET, FILM COATED ORAL at 13:06

## 2019-12-20 RX ADMIN — ONDANSETRON 4 MG: 2 INJECTION INTRAMUSCULAR; INTRAVENOUS at 21:55

## 2019-12-20 RX ADMIN — HEPARIN SODIUM 5000 UNITS: 5000 INJECTION INTRAVENOUS; SUBCUTANEOUS at 13:04

## 2019-12-20 RX ADMIN — TACROLIMUS 2 MG: 1 CAPSULE ORAL at 09:54

## 2019-12-20 RX ADMIN — CLONIDINE HYDROCHLORIDE 0.2 MG: 0.1 TABLET ORAL at 21:59

## 2019-12-20 RX ADMIN — FOLIC ACID 1000 MCG: 1 TABLET ORAL at 09:52

## 2019-12-20 RX ADMIN — DICYCLOMINE HYDROCHLORIDE 10 MG: 10 CAPSULE ORAL at 17:49

## 2019-12-20 RX ADMIN — TACROLIMUS 2 MG: 1 CAPSULE ORAL at 22:03

## 2019-12-20 RX ADMIN — DICYCLOMINE HYDROCHLORIDE 10 MG: 10 CAPSULE ORAL at 13:05

## 2019-12-20 RX ADMIN — SEVELAMER HYDROCHLORIDE 800 MG: 800 TABLET, FILM COATED PARENTERAL at 17:37

## 2019-12-20 RX ADMIN — METOPROLOL TARTRATE 50 MG: 50 TABLET, FILM COATED ORAL at 22:00

## 2019-12-20 RX ADMIN — CHOLESTYRAMINE 4 G: 4 POWDER, FOR SUSPENSION ORAL at 16:16

## 2019-12-20 RX ADMIN — SODIUM BICARBONATE 650 MG TABLET 1300 MG: at 17:37

## 2019-12-20 RX ADMIN — ARIPIPRAZOLE 30 MG: 10 TABLET ORAL at 21:59

## 2019-12-20 RX ADMIN — HYDRALAZINE HYDROCHLORIDE 50 MG: 50 TABLET, FILM COATED ORAL at 05:35

## 2019-12-20 RX ADMIN — ACETAMINOPHEN 650 MG: 325 TABLET ORAL at 05:31

## 2019-12-20 RX ADMIN — PREDNISONE 5 MG: 5 TABLET ORAL at 09:52

## 2019-12-20 RX ADMIN — SEVELAMER HYDROCHLORIDE 800 MG: 800 TABLET, FILM COATED PARENTERAL at 09:53

## 2019-12-20 RX ADMIN — SERTRALINE HYDROCHLORIDE 200 MG: 100 TABLET ORAL at 09:52

## 2019-12-20 RX ADMIN — MELATONIN 3000 UNITS: at 09:52

## 2019-12-20 RX ADMIN — SODIUM BICARBONATE 650 MG TABLET 1300 MG: at 09:52

## 2019-12-20 RX ADMIN — AMLODIPINE BESYLATE 10 MG: 10 TABLET ORAL at 09:52

## 2019-12-20 RX ADMIN — LEVOTHYROXINE SODIUM 125 MCG: 125 TABLET ORAL at 05:32

## 2019-12-20 RX ADMIN — DULOXETINE HYDROCHLORIDE 60 MG: 60 CAPSULE, DELAYED RELEASE ORAL at 17:46

## 2019-12-20 RX ADMIN — LORAZEPAM 2 MG: 1 TABLET ORAL at 13:05

## 2019-12-20 RX ADMIN — Medication 250 MG: at 09:52

## 2019-12-20 RX ADMIN — Medication 250 MG: at 17:37

## 2019-12-20 NOTE — PROGRESS NOTES
Progress Note - Eric Orosco 1965, 53 y o  female MRN: [id number]    Unit/Bed#: -01 Encounter: [id number]    Primary Care Provider: Chaim Soto MD   Date and time admitted to hospital: 12/9/2019  8:29 PM        Bacteremia  Assessment & Plan  · 2/2 BC from admission growing anaerobes  · Was started on ceftriaxone and Flagyl by ID now ceftriaxone discontinued and continue Flagyl with plan of total 7 day course which was discontinued on 12/16/2019  · Source is suspected to be GI secondary to colitis with translocation, ID reviewed the new symptoms  CDIff reordered and is again negative  After looking at the x-ray from yesterday I suspect that she just had a large stool burden with crampy abdominal pain and residual mucoid material   I agree with ID that there may be some lactose component to this and so restriction a products is logical   Questran being added today for crampy new quite stools  · Repeat BC from 12/14 neg so far  · Continue to monitor off antibiotics    Acute metabolic encephalopathy  Assessment & Plan  Patient back to baseline  Neuropsychiatric evaluation shows she has capacity for decision making  Remains cognitively intact  Crampy abdominal pain and loose stooling from 12/18  She did state today at lunch however she still was having mucoid loose stools and occasional cramping which is resolving with Bentyl  Plan is to start some Questran to see if we can resolve this  Continue to monitor    Diarrhea  Assessment & Plan  · Persistent since last admission; C diff during at recent admission at [facility] was negative  Id feels this may have been a viral gastroenteritis  · CT abdomen/pelvis with evidence of colitis of proximal to mid sigmoid and distal left colon  Still having intermittent crampy mucoid stools  ·   Final diagnosis appears to be a viral gastroenteritis  Continuing to observe patient off of metronidazole  Patient having mucoid stools    Will trial Questran twice daily    Hypokalemia  Assessment & Plan  GI loss   repleted  Serial laboratories to monitor  * Enterocolitis  Assessment & Plan  · With history of recurrent UTIs in setting of renal and pancreatic transplant  Most recently with VRE at recent admission to [facility]  · Presenting with abdominal pain/nausea and lethargy/confusion  · CT abdomen/pelvis showing colitis  · With positive BC with anaerobe, treated briefly with rocephin & flagyl per ID, deescalated to only Flagyl which was also subsequently discontinued  · Stool cdiff neg x2, enteric panel neg, leucocytes neg  · procalcitonin 0 3  · Trend WBC, temperature curve  · Checked CMV PCR - negative  · Abdominal pain improved still with mucoid material   Agree with ID this likely should be monitored conservatively without antibiotic therapy  Avoidance of lactose may be helpful  Questran being added due to continued mucoid material     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 [facility])  Assessment & Plan  Lab Results   Component Value Date    HGBA1C 5 1 09/09/2019       Recent Labs     12/18/19  1641 12/18/19  2048 12/19/19  0657 12/19/19  1042   POCGLU 124 109 124 115       Blood Sugar Average: Last 72 hrs:  · (P) 600 [id number] stopped Lantus (in place of Toujeo) 5 units qHS comma will continue to monitor  · Cont SSI with accu-cheks and carb controlled diet  · Initiate hypoglycemia protocol and avoid hypoglycemia    Essential hypertension  Assessment & Plan  Blood pressures have remained variable  Patient currently on clonidine t i d  And Cardura  Norvasc was discontinued  Continue to monitor  Blood pressures are now trending upwards  May need to re- add low-dose Norvasc  Will continue to monitor and decide      Multiple myeloma not having achieved remission [facility])  Assessment & Plan  · Follows with Dr Rashawn Gomez as outpatient  · Continue Revlimid at home dosage  · Monitor hb, running low  · H/o transfusions, hemoglobin has remained stable at 7 6  Will continue hold on transfusion as she has multiple autoantibodies noted  Patient remains stable in the mid sevens  Will have her follow up shortly after discharge from hospital     Chronic kidney disease, stage 4 (severe) ([facility])  Assessment & Plan  · Creatinine has been relatively stable  Continue to monitor with serial laboratories  · Monitor volume status, not all UO documented  · Monitor BMP while inpatient  · Avoid nephrotoxins as able, renally dose medications as indicated  · Patient back eating and drinking no need for IV fluids    Renal transplant recipient  Assessment & Plan  · On chronic immunosuppression with tacrolimus and prednisone  · Unfortunately patient noncompliant with all medications since discharge including these  · Tacrolimus level was 4 > 10 > 7 (12/12) > 5 (12/14) remains stable at 4 4  · dose decreased to 2 BID on 12/12, again decrease to 2 mg QAM & 1 5 mg HS from 12/14  · Tacrolimus level was stable on the current dosing  · Per nephro today - level of 5 was appropriate so increased tac back to 2 mg BID at home dose  Tacrolimus level 4 4 slightly below goal   Nephrology recommending continued dosing of 2 mg b i d  Metabolic acidosis  Assessment & Plan  · Appears acute on chronic  · S/P kidney and pancreatic transplant in 1998  · continue p o  and trend BMP  · Nephrology note appreciated  Patient with an RTA related to her pancreatic transplant  Continue to monitor  Asymptomatic bacteriuria  Assessment & Plan  · Recently completed 7 D course of IV daptomycin for VRE UTI  Monitoring off antibiotic therapy  · Has positive cultures for VRE again, but this is not the cause of her symptoms, its colonization  · Monitor off Abx  · ID on board        VTE Pharmacologic Prophylaxis:   Pharmacologic: Heparin  Mechanical: Mechanical VTE prophylaxis in place      Patient Centered Rounds: I have performed bedside rounds with nursing staff today   Discussions with Specialists or Other Care Team Provider:  Id, reviewed Nephrology  Education and Discussions with Family / Patient:  Updated patient's father  Time Spent for Care: 30 minutes  If More than 50% of total time spent on counseling and coordination of care as described above indicate yes or no and described the counseling and coordination:  No    Current Length of Stay: 10 day(s)  Current Patient Status: Inpatient   Certification Statement: The patient will continue to require additional inpatient hospital stay due to Mucoid still with abdominal discomfort persists    Discharge Plan:  Awaiting isolation room at rehab and hot titrating medications for abdominal discomfort  Code Status: Level 1 - Full Code    Subjective:   Patient states she has some crampy abdominal pain after lunch but was doing better this morning  No reports of nausea vomiting  Objective:   Vitals:   Temp (24hrs), Av 7 °F (37 1 °C), Min:98 6 °F (37 °C), Max:98 7 °F (37 1 °C)    Temp:  [98 6 °F (37 °C)-98 7 °F (37 1 °C)] 98 7 °F (37 1 °C)  HR:  [62-66] 66  Resp:  [16-18] 18  BP: (148-160)/(57-64) 148/57  SpO2:  [93 %-99 %] 99 %  Body mass index is 35 91 kg/m²  Input and Output Summary (last 24 hours):        Intake/Output Summary (Last 24 hours) at 2019 1602  Last data filed at 2019 1200  Gross per 24 hour   Intake 600 ml   Output 600 ml   Net 0 ml       Physical Exam:  Generally obese female no acute distress normocephalic atraumatic pupils equal round reactive to light extraocular muscles intact mucous membranes are moist neck is supple there is no JVD no lymph nodes no carotid bruits chest is decreased but clear to auscultation is no rhonchi rales or wheezes  Cardiovascular regular rate rhythm positive S1-S2 no S3-S4 murmur or gallop  Abdomen obese soft nontender nondistended with positive bowel sounds no hepatosplenomegaly no guarding or rebound  Neurologically patient awake alert oriented cranial nerves 2-12 intact    Physical Exam    Additional Data:   Labs:  Results from last 7 days   Lab Units 12/19/19  0838  12/16/19  0522 12/15/19  0431   WBC Thousand/uL 6 59   < > 6 86 7 02   HEMOGLOBIN g/dL 7 7*   < > 7 8* 7 3*   HEMATOCRIT % 24 7*   < > 25 4* 23 9*   PLATELETS Thousands/uL 172   < > 166 154   NEUTROS PCT %  --   --   --  58   LYMPHS PCT %  --   --   --  27   LYMPHO PCT %  --   --  17  --    MONOS PCT %  --   --   --  8   MONO PCT %  --   --  3*  --    EOS PCT %  --   --  4 4    < > = values in this interval not displayed  Results from last 7 days   Lab Units 12/19/19  0838  12/15/19  0431   POTASSIUM mmol/L 3 5   < > 3 4*   CHLORIDE mmol/L 117*   < > 115*   CO2 mmol/L 18*   < > 19*   BUN mg/dL 30*   < > 43*   CREATININE mg/dL 2 56*   < > 2 63*   CALCIUM mg/dL 8 1*   < > 8 2*   ALK PHOS U/L  --   --  91   ALT U/L  --   --  18   AST U/L  --   --  11    < > = values in this interval not displayed  * I Have Reviewed All Lab Data Listed Above  * Additional Pertinent Lab Tests Reviewed: All Labs Within Last 24 Hours Reviewed    Imaging:    Imaging Reports Reviewed Today Include:  None    Cultures:   Blood Culture:   Lab Results   Component Value Date    BLOODCX No Growth After 5 Days  12/14/2019    BLOODCX Porphyromonas asaccharolytica (A) 12/09/2019    BLOODCX Porphyromonas asaccharolytica (A) 12/09/2019    BLOODCX No Growth After 5 Days  10/01/2019    BLOODCX No Growth After 5 Days  10/01/2019    BLOODCX No Growth After 5 Days  11/16/2017    BLOODCX No Growth After 5 Days   09/13/2017     Urine Culture:   Lab Results   Component Value Date    URINECX (A) 12/09/2019     >100,000 cfu/ml Vancomycin Resistant Enterococcus faecium    URINECX (A) 12/09/2019     2052-8400 cfu/ml Gram-negative cintia- species    URINECX (A) 11/24/2019     >100,000 cfu/ml Vancomycin Resistant Enterococcus faecium    URINECX No Growth <1000 cfu/mL 11/05/2019    URINECX <10,000 cfu/ml  11/04/2019    URINECX >100,000 cfu/ml Escherichia coli (A) 10/23/2019    URINECX 10,000-19,000 cfu/ml  10/23/2019     Sputum Culture: No components found for: SPUTUMCX  Wound Culture: No results found for: WOUNDCULT    Last 24 Hours Medication List:     Current Facility-Administered Medications:  acetaminophen 650 mg Oral Q6H PRN Kourtney Ortiz, JOHN   aluminum-magnesium hydroxide-simethicone 30 mL Oral Q4H PRN Kourtney Ortiz PA-C   amLODIPine 10 mg Oral Daily Reji Long MD   ARIPiprazole 30 mg Oral HS Laith Marshall, DO   aspirin 81 mg Oral Daily Laith Marshall, DO   busPIRone 5 mg Oral BID Laith Marshall, DO   cholecalciferol 3,000 Units Oral Daily Laith Marshall, DO   cholestyramine sugar free 4 g Oral BID With Meals Beth Bazzi MD   cloNIDine 0 2 mg Oral Q8H [facility] Cachorro Donald MD   dicyclomine 10 mg Oral TID PRN Reji Long MD   doxazosin 2 mg Oral HS Cachorro Donald MD   DULoxetine 60 mg Oral BID Laith Marshall, DO   ferrous sulfate 325 mg Oral Daily With Breakfast Laith Marshall, DO   folic acid 7,155 mcg Oral Daily Laith Marshall, DO   heparin (porcine) 5,000 Units Subcutaneous [facility] Laith Marshall, DO   hydrALAZINE 5 mg Intravenous Q6H PRN Reji Long MD   hydrALAZINE 50 mg Oral Q8H [facility] Cachorro Donald MD   insulin lispro 1-5 Units Subcutaneous HS Laith Marshall, DO   insulin lispro 1-6 Units Subcutaneous TID AC Reji Long MD   levothyroxine 125 mcg Oral Early Morning Laith Marshall, DO   LORazepam 2 mg Oral TID PRN Laith Marshall, DO   metoprolol tartrate 50 mg Oral Q12H [facility] Laith Marshall, DO   ondansetron 4 mg Intravenous Q4H PRN Jeffrey Butterfield MD   pravastatin 80 mg Oral Daily Laith Marshall, DO   predniSONE 5 mg Oral Daily Laith Marshall, DO   rOPINIRole 0 25 mg Oral BID Laith Marshall, DO   saccharomyces boulardii 250 mg Oral BID Laith Marshall, DO   sertraline 200 mg Oral Daily Laith Marshall, DO   sevelamer 800 mg Oral TID With Meals Laith Marshall, DO   sodium bicarbonate 1,300 mg Oral BID after meals Jess Miller MD   tacrolimus 2 mg Oral Daily Miller Dakin, DO   tacrolimus 2 mg Oral HS Jess Miller MD        Today, Patient Was Seen By: Beth Bazzi MD    ** Please Note: Dragon 360 Dictation voice to text software may have been used in the creation of this document   **

## 2019-12-20 NOTE — PROGRESS NOTES
Progress Note - Infectious Disease   Brien Willett 53 y o  female MRN: [id number]  Unit/Bed#: -01 Encounter: [id number]      Impression/Recommendations:  1   Anaerobic Gram-negative bacteremia  Susanne Burkett GI translocation in setting of #2   CT A/P otherwise negative   Clinically stable without sepsis   Clinically improved prior to initiation of antibiotics   Status post 5 days of Flagyl complicated by severe GI side effects  Rec:  ? Continue to follow closely off antibiotics  ? 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 Continue to follow closely off antibiotics  ? Follow stool output closely  ? Add cholestyramine daily to help bulk stool  Hold for constipation  ? Serial abdominal exams  ? Supportive care as per the primary service  ? Cautious intake of milk products     3   Asymptomatic bacteriuria   Seen on UA   Likely chronically colonized   No active symptoms of UTI   No treatment indicated      4   Recent VRE UTI   Status post 7 days daptomycin with clinical improvement     5   History of renal/pancreatic transplant   1998   Has pancreatic secretions draining in to bladder   On chronic immunosuppression      6   CKD   Stable at baseline creatinine 2  5      7   MM   Recently started on Revlimid      The patient is stable from an ID standpoint  We will sign off for now  Please call with new questions      Antibiotics:  Off antibiotics #4    Subjective:  Patient seen on AM rounds  Feels great  Continues to have frequent small volume liquid stool with mucus  No nausea or vomiting  Eating well an appetite is improving    Abdominal pain is resolved  24 Hour Events:  No documented fevers, chills, sweats, nausea, vomiting  Several loose stools noted by nursing    Objective:  Vitals:  Temp:  [98 3 °F (36 8 °C)-98 7 °F (37 1 °C)] 98 7 °F (37 1 °C)  HR:  [61-64] 62  Resp:  [16-18] 18  BP: (154-160)/(56-64) 154/64  SpO2:  [93 %-96 %] 93 %  Temp (24hrs), Av 5 °F (36 9 °C), Min:98 3 °F (36 8 °C), Max:98 7 °F (37 1 °C)  Current: Temperature: 98 7 °F (37 1 °C)    Physical Exam:   General:  No acute distress  Psychiatric:  Awake and alert  Pulmonary:  Normal respiratory excursion without accessory muscle use  Abdomen:  Soft, nontender  Extremities:  No edema  Skin:  No rashes    Lab Results:  I have personally reviewed pertinent labs  Results from last 7 days   Lab Units 19  0838 19  0845 19  0855  12/15/19  0431 19  0711   POTASSIUM mmol/L 3 5 3 3* 3 3*   < > 3 4* 4 5   CHLORIDE mmol/L 117* 117* 116*   < > 115* 114*   CO2 mmol/L 18* 16* 17*   < > 19* 18*   BUN mg/dL 30* 32* 32*   < > 43* 45*   CREATININE mg/dL 2 56* 2 55* 2 50*   < > 2 63* 2 76*   EGFR ml/min/1 73sq m 20 21 21   < > 20 19   CALCIUM mg/dL 8 1* 8 4 8 2*   < > 8 2* 8 1*   AST U/L  --   --   --   --  11 27   ALT U/L  --   --   --   --  18 17   ALK PHOS U/L  --   --   --   --  91 88    < > = values in this interval not displayed  Results from last 7 days   Lab Units 19  0838 19  0909 19  0522   WBC Thousand/uL 6 59 6 12 6 86   HEMOGLOBIN g/dL 7 7* 7 6* 7 8*   PLATELETS Thousands/uL 172 163 166     Results from last 7 days   Lab Units 19  1326 19  0823   BLOOD CULTURE   --  No Growth After 5 Days  C DIFF TOXIN B  Negative  --        Imaging Studies:   I have personally reviewed pertinent imaging study reports and images in PACS  EKG, Pathology, and Other Studies:   I have personally reviewed pertinent reports

## 2019-12-20 NOTE — PHYSICAL THERAPY NOTE
Physical Therapy Cancellation Note    Attempted to see patient for PT session this afternoon; however, pt refused OOB activity due to fatigue and abdominal discomfort  Pt continued to refuse despite education on the importance of mobility  PT to continue to follow and will re-attempt as time permits           Nasreen Jerome PT, DPT

## 2019-12-20 NOTE — SOCIAL WORK
DC Plan coordination with pt's Father:     Dr Steph Gonzalez of [facility] advised that the patient is anticipated for possible dc tomorrow Sat 12/21  CM awaiting [facility] responses from [facility] and [address] for available isolation bed  CM called and spoke with pt's Father Amira Rizzo to review dc plan, pending status of available beds at rehab and anticipated dc  Amira Rizzo asked about transport plan  CM reviewed BLS qualifiers and stated pt would need to have family transport or go via [employer] with an out of pocket cost to the patient  Son Amira Rizzo stated that he would be available to transport her to the rehab  MSW SALTY Leo advised

## 2019-12-20 NOTE — RESTORATIVE TECHNICIAN NOTE
Restorative Specialist Mobility Note       Activity: Ambulate in barron, Ambulate in room, Bathroom privileges, Chair, Dangle, Stand at bedside(Educated/encouraged pt to ambulate with assistance 3-4 x's/day  Chair alarm on   Pt callbell, phone/tray within reach )     Assistive Device: Front wheel walker, Other (Comment)(B/L shoes on )          Mavis STONE, Restorative Technician, [employer]

## 2019-12-20 NOTE — PROGRESS NOTES
NEPHROLOGY PROGRESS NOTE    Johanna Mattson 53 y o  female MRN: [id number]  Unit/Bed#: -01 Encounter: [id number]  Reason for Consult:  Chronic kidney disease and renal transplant    The patient says she is feeling really good this morning still having a little mucus in her stool but less  She is eating she is not nauseous no abdominal pain and really feels upbeat this morning  She states she is emptying her bladder well also  ASSESSMENT/PLAN:  1  Renal    Patient has chronic kidney disease in her renal allograft with baseline creatinine of around 2 5  She is currently on immunosuppression with tacrolimus 2 mg every 12 hours and prednisone 5 mg a day  Her creatinine is been stable at her baseline after initial acute renal insufficiency on presentation  She did have a tacrolimus trough level that was 5 so I would continue on the current dosages  No changes in immunosuppression  Monitor labs and volume status periodically    2  Metabolic acidosis    Patient has failed pancreas transplant but the exocrine secretions are excreted through the urine so she has bicarb losses there she will continue with her oral bicarbonate repletion  Monitor for need to increase dose  3  Diarrhea    This was felt to be due to enterocolitis and she did have initially have bacteremia  Infectious Disease was consulted she was treated with antibiotics and is now off of them  Infectious Disease was following as well  4  History of frequent UTI  Most recent was VRE status post 7 days daptomycin  5  History of multiple myeloma  He patient receives chemotherapy from Hematology Oncology this is a chronic problem  6  PT/OT  Patient is awaiting rehab placement  SUBJECTIVE:  Review of Systems   Constitution: Negative for chills, decreased appetite, fever and malaise/fatigue  HENT: Negative  Eyes: Negative  Cardiovascular: Negative  Negative for chest pain, dyspnea on exertion, leg swelling and orthopnea  Respiratory: Negative  Negative for cough, shortness of breath, sputum production and wheezing  Gastrointestinal: Negative  Negative for bloating, abdominal pain, anorexia, nausea and vomiting  Mucus in stool but less  Genitourinary: Negative  Negative for bladder incontinence, dysuria, hematuria and incomplete emptying  Neurological: Negative  Psychiatric/Behavioral: Negative  OBJECTIVE:  Current Weight: Weight - Scale: 107 kg (236 lb 3 2 oz)  Vitals:Temp (24hrs), Av 5 °F (36 9 °C), Min:98 3 °F (36 8 °C), Max:98 7 °F (37 1 °C)  Current: Temperature: 98 7 °F (37 1 °C)   Blood pressure 154/64, pulse 62, temperature 98 7 °F (37 1 °C), resp  rate 18, height 5' 8" (1 727 m), weight 107 kg (236 lb 3 2 oz), SpO2 93 %  , Body mass index is 35 91 kg/m²  Intake/Output Summary (Last 24 hours) at 2019 0841  Last data filed at 2019 2217  Gross per 24 hour   Intake 960 ml   Output 1000 ml   Net -40 ml       Physical Exam: /64   Pulse 62   Temp 98 7 °F (37 1 °C)   Resp 18   Ht 5' 8" (1 727 m)   Wt 107 kg (236 lb 3 2 oz)   LMP  (LMP Unknown)   SpO2 93%   BMI 35 91 kg/m²   Physical Exam   Constitutional: She is oriented to person, place, and time  She appears well-nourished  She does not appear ill  HENT:   Head: Atraumatic  Mouth/Throat: Oropharynx is clear and moist    Eyes: EOM are normal    Cardiovascular: Normal rate and regular rhythm  Exam reveals no gallop and no friction rub  Pulmonary/Chest: Effort normal and breath sounds normal  No respiratory distress  She has no wheezes  She has no rhonchi  She has no rales  Abdominal: Normal appearance and bowel sounds are normal  She exhibits no distension  There is no tenderness  Neurological: She is alert and oriented to person, place, and time  Skin: Skin is warm and dry  No rash noted  No erythema  Psychiatric: She has a normal mood and affect         Medications:    Current Facility-Administered Medications:     acetaminophen (TYLENOL) tablet 650 mg, 650 mg, Oral, Q6H PRN, Kourtney Ortiz PA-C, 650 mg at 12/20/19 0531    aluminum-magnesium hydroxide-simethicone (MYLANTA) 200-200-20 mg/5 mL oral suspension 30 mL, 30 mL, Oral, Q4H PRN, Kourtney Ortiz PA-C, 30 mL at 12/11/19 0148    amLODIPine (NORVASC) tablet 10 mg, 10 mg, Oral, Daily, Melissa Lyle MD, 10 mg at 12/19/19 0817    ARIPiprazole (ABILIFY) tablet 30 mg, 30 mg, Oral, HS, Loharpreet Shearing, DO, 30 mg at 12/19/19 2102    aspirin chewable tablet 81 mg, 81 mg, Oral, Daily, Loistine Shearing, DO, 81 mg at 12/19/19 0818    busPIRone (BUSPAR) tablet 5 mg, 5 mg, Oral, BID, Loistine Shearing, DO, 5 mg at 12/19/19 1649    cholecalciferol (VITAMIN D3) tablet 3,000 Units, 3,000 Units, Oral, Daily, Loharpreet Shearing, DO, 3,000 Units at 12/19/19 0818    cloNIDine (CATAPRES) tablet 0 2 mg, 0 2 mg, Oral, Q8H [address], Vasquez Edmondson MD, 0 2 mg at 12/20/19 0534    dicyclomine (BENTYL) capsule 10 mg, 10 mg, Oral, TID PRN, Radha Perez MD, 10 mg at 12/19/19 0817    doxazosin (CARDURA) tablet 2 mg, 2 mg, Oral, HS, Vasquez Edmondson MD, 2 mg at 12/19/19 2107    DULoxetine (CYMBALTA) delayed release capsule 60 mg, 60 mg, Oral, BID, Loistine Shearing, DO, 60 mg at 12/19/19 1649    ferrous sulfate tablet 325 mg, 325 mg, Oral, Daily With Breakfast, Loharpreet Shearing, DO, 325 mg at 17/72/55 1814    folic acid (FOLVITE) tablet 1,000 mcg, 1,000 mcg, Oral, Daily, Loistine Shearing, DO, 1,000 mcg at 12/19/19 0818    heparin (porcine) subcutaneous injection 5,000 Units, 5,000 Units, Subcutaneous, Q8H [address], 5,000 Units at 12/20/19 0532 **AND** [CANCELED] Platelet count, , , Once, Dany Islasing, DO    hydrALAZINE (APRESOLINE) injection 5 mg, 5 mg, Intravenous, Q6H PRN, Radha Perez MD, 5 mg at 12/16/19 1733    hydrALAZINE (APRESOLINE) tablet 50 mg, 50 mg, Oral, Q8H [address], Vasquez Edmondson MD, 50 mg at 12/20/19 0535    insulin lispro (HumaLOG) 100 units/mL subcutaneous injection 1-5 Units, 1-5 Units, Subcutaneous, HS, [place] Alias, DO    insulin lispro (HumaLOG) 100 units/mL subcutaneous injection 1-6 Units, 1-6 Units, Subcutaneous, TID AC, 1 Units at 12/17/19 1208 **AND** Fingerstick Glucose (POCT), , , 4x Daily AC and at bedtime, Raul Chavez MD    levothyroxine tablet 125 mcg, 125 mcg, Oral, Early Morning, [place] Alias, DO, 125 mcg at 12/20/19 0532    LORazepam (ATIVAN) tablet 2 mg, 2 mg, Oral, TID PRN, [place], DO, 2 mg at 12/19/19 1324    metoprolol tartrate (LOPRESSOR) tablet 50 mg, 50 mg, Oral, Q12H [facility], [place] Alias, DO, 50 mg at 12/19/19 2108    ondansetron (ZOFRAN) injection 4 mg, 4 mg, Intravenous, Q4H PRN, Maggie Aguero MD, 4 mg at 12/18/19 2152    pravastatin (PRAVACHOL) tablet 80 mg, 80 mg, Oral, Daily, [place] Alias, DO, 80 mg at 12/19/19 5757    predniSONE tablet 5 mg, 5 mg, Oral, Daily, [place] Alias, DO, 5 mg at 12/19/19 5253    rOPINIRole (REQUIP) tablet 0 25 mg, 0 25 mg, Oral, BID, [place] Alias, DO, 0 25 mg at 12/19/19 1650    saccharomyces boulardii (FLORASTOR) capsule 250 mg, 250 mg, Oral, BID, [place] Alias, DO, 250 mg at 12/19/19 1650    sertraline (ZOLOFT) tablet 200 mg, 200 mg, Oral, Daily, [place] Alias, DO, 200 mg at 12/19/19 0817    sevelamer (RENAGEL) tablet 800 mg, 800 mg, Oral, TID With Meals, [place] Alias, DO, 800 mg at 12/19/19 1650    sodium bicarbonate tablet 1,300 mg, 1,300 mg, Oral, BID after meals, Brendan Crandall MD, 1,300 mg at 12/19/19 1648    tacrolimus (PROGRAF) capsule 2 mg, 2 mg, Oral, Daily, Seamus Fu DO, 2 mg at 12/19/19 0818    tacrolimus (PROGRAF) capsule 2 mg, 2 mg, Oral, HS, Brendan Crandall MD, 2 mg at 12/19/19 2102    Laboratory Results:  Lab Results   Component Value Date    WBC 6 59 12/19/2019    HGB 7 7 (L) 12/19/2019    HCT 24 7 (L) 12/19/2019     (H) 12/19/2019     12/19/2019     Lab Results   Component Value Date    SODIUM 142 12/19/2019    K 3 5 12/19/2019  (H) 12/19/2019    CO2 18 (L) 12/19/2019    BUN 30 (H) 12/19/2019    CREATININE 2 56 (H) 12/19/2019    GLUC 148 (H) 12/19/2019    CALCIUM 8 1 (L) 12/19/2019     Lab Results   Component Value Date    CALCIUM 8 1 (L) 12/19/2019    PHOS 4 7 (H) 12/04/2019     No results found for: LABPROT

## 2019-12-21 LAB
ALBUMIN SERPL BCP-MCNC: 2.4 G/DL (ref 3.5–5)
ALP SERPL-CCNC: 95 U/L (ref 46–116)
ALT SERPL W P-5'-P-CCNC: 15 U/L (ref 12–78)
ANION GAP SERPL CALCULATED.3IONS-SCNC: 8 MMOL/L (ref 4–13)
AST SERPL W P-5'-P-CCNC: 13 U/L (ref 5–45)
BASOPHILS # BLD AUTO: 0.07 THOUSANDS/ΜL (ref 0–0.1)
BASOPHILS NFR BLD AUTO: 1 % (ref 0–1)
BILIRUB SERPL-MCNC: 0.3 MG/DL (ref 0.2–1)
BUN SERPL-MCNC: 32 MG/DL (ref 5–25)
CALCIUM SERPL-MCNC: 8 MG/DL (ref 8.3–10.1)
CHLORIDE SERPL-SCNC: 119 MMOL/L (ref 100–108)
CO2 SERPL-SCNC: 15 MMOL/L (ref 21–32)
CREAT SERPL-MCNC: 2.7 MG/DL (ref 0.6–1.3)
CREAT UR-MCNC: 31 MG/DL
EOSINOPHIL # BLD AUTO: 0.31 THOUSAND/ΜL (ref 0–0.61)
EOSINOPHIL NFR BLD AUTO: 4 % (ref 0–6)
ERYTHROCYTE [DISTWIDTH] IN BLOOD BY AUTOMATED COUNT: 17.2 % (ref 11.6–15.1)
GFR SERPL CREATININE-BSD FRML MDRD: 19 ML/MIN/1.73SQ M
GLUCOSE SERPL-MCNC: 110 MG/DL (ref 65–140)
GLUCOSE SERPL-MCNC: 112 MG/DL (ref 65–140)
GLUCOSE SERPL-MCNC: 114 MG/DL (ref 65–140)
GLUCOSE SERPL-MCNC: 344 MG/DL (ref 65–140)
GLUCOSE SERPL-MCNC: 93 MG/DL (ref 65–140)
HCT VFR BLD AUTO: 24.7 % (ref 34.8–46.1)
HGB BLD-MCNC: 7.6 G/DL (ref 11.5–15.4)
IMM GRANULOCYTES # BLD AUTO: 0.11 THOUSAND/UL (ref 0–0.2)
IMM GRANULOCYTES NFR BLD AUTO: 1 % (ref 0–2)
LYMPHOCYTES # BLD AUTO: 1.17 THOUSANDS/ΜL (ref 0.6–4.47)
LYMPHOCYTES NFR BLD AUTO: 14 % (ref 14–44)
MAGNESIUM SERPL-MCNC: 2.4 MG/DL (ref 1.6–2.6)
MCH RBC QN AUTO: 32.1 PG (ref 26.8–34.3)
MCHC RBC AUTO-ENTMCNC: 30.8 G/DL (ref 31.4–37.4)
MCV RBC AUTO: 104 FL (ref 82–98)
MONOCYTES # BLD AUTO: 0.77 THOUSAND/ΜL (ref 0.17–1.22)
MONOCYTES NFR BLD AUTO: 9 % (ref 4–12)
NEUTROPHILS # BLD AUTO: 6.11 THOUSANDS/ΜL (ref 1.85–7.62)
NEUTS SEG NFR BLD AUTO: 71 % (ref 43–75)
NRBC BLD AUTO-RTO: 0 /100 WBCS
PHOSPHATE SERPL-MCNC: 5 MG/DL (ref 2.7–4.5)
PLATELET # BLD AUTO: 163 THOUSANDS/UL (ref 149–390)
PMV BLD AUTO: 13.1 FL (ref 8.9–12.7)
POTASSIUM SERPL-SCNC: 3.3 MMOL/L (ref 3.5–5.3)
PROT SERPL-MCNC: 6.1 G/DL (ref 6.4–8.2)
PROT UR-MCNC: 134 MG/DL
PROT/CREAT UR: 4.32 MG/G{CREAT} (ref 0–0.1)
RBC # BLD AUTO: 2.37 MILLION/UL (ref 3.81–5.12)
SODIUM SERPL-SCNC: 142 MMOL/L (ref 136–145)
TACROLIMUS BLD-MCNC: 4.5 NG/ML (ref 2–20)
WBC # BLD AUTO: 8.54 THOUSAND/UL (ref 4.31–10.16)

## 2019-12-21 PROCEDURE — 80053 COMPREHEN METABOLIC PANEL: CPT | Performed by: INTERNAL MEDICINE

## 2019-12-21 PROCEDURE — 99232 SBSQ HOSP IP/OBS MODERATE 35: CPT | Performed by: INTERNAL MEDICINE

## 2019-12-21 PROCEDURE — 82948 REAGENT STRIP/BLOOD GLUCOSE: CPT

## 2019-12-21 PROCEDURE — 84100 ASSAY OF PHOSPHORUS: CPT | Performed by: INTERNAL MEDICINE

## 2019-12-21 PROCEDURE — 82570 ASSAY OF URINE CREATININE: CPT | Performed by: INTERNAL MEDICINE

## 2019-12-21 PROCEDURE — 80197 ASSAY OF TACROLIMUS: CPT | Performed by: INTERNAL MEDICINE

## 2019-12-21 PROCEDURE — 84156 ASSAY OF PROTEIN URINE: CPT | Performed by: INTERNAL MEDICINE

## 2019-12-21 PROCEDURE — 85025 COMPLETE CBC W/AUTO DIFF WBC: CPT | Performed by: INTERNAL MEDICINE

## 2019-12-21 PROCEDURE — 83735 ASSAY OF MAGNESIUM: CPT | Performed by: INTERNAL MEDICINE

## 2019-12-21 RX ORDER — POTASSIUM CHLORIDE 20 MEQ/1
40 TABLET, EXTENDED RELEASE ORAL ONCE
Status: COMPLETED | OUTPATIENT
Start: 2019-12-21 | End: 2019-12-21

## 2019-12-21 RX ADMIN — CLONIDINE HYDROCHLORIDE 0.2 MG: 0.1 TABLET ORAL at 06:03

## 2019-12-21 RX ADMIN — BUSPIRONE HYDROCHLORIDE 5 MG: 5 TABLET ORAL at 17:07

## 2019-12-21 RX ADMIN — HEPARIN SODIUM 5000 UNITS: 5000 INJECTION INTRAVENOUS; SUBCUTANEOUS at 22:13

## 2019-12-21 RX ADMIN — DICYCLOMINE HYDROCHLORIDE 10 MG: 10 CAPSULE ORAL at 20:30

## 2019-12-21 RX ADMIN — SEVELAMER HYDROCHLORIDE 800 MG: 800 TABLET, FILM COATED PARENTERAL at 17:06

## 2019-12-21 RX ADMIN — ONDANSETRON 4 MG: 2 INJECTION INTRAMUSCULAR; INTRAVENOUS at 06:00

## 2019-12-21 RX ADMIN — HEPARIN SODIUM 5000 UNITS: 5000 INJECTION INTRAVENOUS; SUBCUTANEOUS at 05:55

## 2019-12-21 RX ADMIN — CLONIDINE HYDROCHLORIDE 0.2 MG: 0.1 TABLET ORAL at 14:01

## 2019-12-21 RX ADMIN — Medication 250 MG: at 09:50

## 2019-12-21 RX ADMIN — HEPARIN SODIUM 5000 UNITS: 5000 INJECTION INTRAVENOUS; SUBCUTANEOUS at 14:02

## 2019-12-21 RX ADMIN — HYDRALAZINE HYDROCHLORIDE 50 MG: 50 TABLET, FILM COATED ORAL at 22:11

## 2019-12-21 RX ADMIN — PRAVASTATIN SODIUM 80 MG: 80 TABLET ORAL at 09:50

## 2019-12-21 RX ADMIN — ARIPIPRAZOLE 30 MG: 10 TABLET ORAL at 22:10

## 2019-12-21 RX ADMIN — METOPROLOL TARTRATE 50 MG: 50 TABLET, FILM COATED ORAL at 22:11

## 2019-12-21 RX ADMIN — BUSPIRONE HYDROCHLORIDE 5 MG: 5 TABLET ORAL at 09:51

## 2019-12-21 RX ADMIN — ROPINIROLE 0.25 MG: 0.25 TABLET, FILM COATED ORAL at 09:50

## 2019-12-21 RX ADMIN — ROPINIROLE 0.25 MG: 0.25 TABLET, FILM COATED ORAL at 17:06

## 2019-12-21 RX ADMIN — INSULIN LISPRO 5 UNITS: 100 INJECTION, SOLUTION INTRAVENOUS; SUBCUTANEOUS at 07:55

## 2019-12-21 RX ADMIN — SODIUM BICARBONATE 650 MG TABLET 1300 MG: at 17:11

## 2019-12-21 RX ADMIN — SERTRALINE HYDROCHLORIDE 200 MG: 100 TABLET ORAL at 09:51

## 2019-12-21 RX ADMIN — SODIUM BICARBONATE 75 ML/HR: 84 INJECTION, SOLUTION INTRAVENOUS at 16:28

## 2019-12-21 RX ADMIN — ASPIRIN 81 MG 81 MG: 81 TABLET ORAL at 09:51

## 2019-12-21 RX ADMIN — POTASSIUM CHLORIDE 40 MEQ: 1500 TABLET, EXTENDED RELEASE ORAL at 17:06

## 2019-12-21 RX ADMIN — CLONIDINE HYDROCHLORIDE 0.2 MG: 0.1 TABLET ORAL at 22:11

## 2019-12-21 RX ADMIN — TACROLIMUS 2 MG: 1 CAPSULE ORAL at 12:17

## 2019-12-21 RX ADMIN — SEVELAMER HYDROCHLORIDE 800 MG: 800 TABLET, FILM COATED PARENTERAL at 12:25

## 2019-12-21 RX ADMIN — CHOLESTYRAMINE 4 G: 4 POWDER, FOR SUSPENSION ORAL at 09:55

## 2019-12-21 RX ADMIN — Medication 250 MG: at 17:06

## 2019-12-21 RX ADMIN — PREDNISONE 5 MG: 5 TABLET ORAL at 09:51

## 2019-12-21 RX ADMIN — DOXAZOSIN 2 MG: 2 TABLET ORAL at 22:11

## 2019-12-21 RX ADMIN — LEVOTHYROXINE SODIUM 125 MCG: 125 TABLET ORAL at 06:03

## 2019-12-21 RX ADMIN — SODIUM BICARBONATE 650 MG TABLET 1300 MG: at 09:51

## 2019-12-21 RX ADMIN — MELATONIN 3000 UNITS: at 09:50

## 2019-12-21 RX ADMIN — DULOXETINE HYDROCHLORIDE 60 MG: 60 CAPSULE, DELAYED RELEASE ORAL at 09:51

## 2019-12-21 RX ADMIN — SEVELAMER HYDROCHLORIDE 800 MG: 800 TABLET, FILM COATED PARENTERAL at 09:50

## 2019-12-21 RX ADMIN — FERROUS SULFATE TAB 325 MG (65 MG ELEMENTAL FE) 325 MG: 325 (65 FE) TAB at 09:51

## 2019-12-21 RX ADMIN — HYDRALAZINE HYDROCHLORIDE 50 MG: 50 TABLET, FILM COATED ORAL at 06:03

## 2019-12-21 RX ADMIN — DULOXETINE HYDROCHLORIDE 60 MG: 60 CAPSULE, DELAYED RELEASE ORAL at 17:06

## 2019-12-21 RX ADMIN — ONDANSETRON 4 MG: 2 INJECTION INTRAMUSCULAR; INTRAVENOUS at 23:19

## 2019-12-21 RX ADMIN — AMLODIPINE BESYLATE 10 MG: 10 TABLET ORAL at 09:51

## 2019-12-21 RX ADMIN — TACROLIMUS 2 MG: 1 CAPSULE ORAL at 22:10

## 2019-12-21 RX ADMIN — METOPROLOL TARTRATE 50 MG: 50 TABLET, FILM COATED ORAL at 09:51

## 2019-12-21 RX ADMIN — FOLIC ACID 1000 MCG: 1 TABLET ORAL at 09:51

## 2019-12-21 RX ADMIN — HYDRALAZINE HYDROCHLORIDE 50 MG: 50 TABLET, FILM COATED ORAL at 14:02

## 2019-12-21 RX ADMIN — DICYCLOMINE HYDROCHLORIDE 10 MG: 10 CAPSULE ORAL at 12:10

## 2019-12-21 NOTE — PROGRESS NOTES
NEPHROLOGY PROGRESS NOTE   Violet Small 53 y o  female MRN: [id number]  Unit/Bed#: -01 Encounter: [id number]      ASSESSMENT & PLAN:  1  Chronic kidney disease, Status post kidney pancreas transplant with baseline creatinine in the mid 2s, pancreas failed  Creatinine today slightly higher, suspected in the setting of persistent diarrhea  Noted also worsening acidosis as well as hypokalemia secondary to GI losses  Will start gentle bicarb drip at 75 cc/hour and follow labs in the morning  2  Anaerobic Gram-negative bacteremia, ID on board, suspected secondary to GI translocation  3  Acute enterocolitis, repeat C diff was negative  4  Asymptomatic bacteriuria  5  Metabolic acidosis, worsening in the setting of advanced kidney disease as well as GI losses  Patient is currently on bicarb tablets  Will start bicarb drip as above  6  Hypokalemia the setting of GI losses  Will give 40 mEq of potassium p o  And follow labs in the morning  7  History of multiple myeloma, Hematology on board  8  History of frequent UTIs, VRE status  My plan and recommendation were discussed with Dr Maeve Denney from Internal Medicine team    SUBJECTIVE:  Patient seen and examined, states that she is having worsening diarrhea as well as abdomen and crampy today, denies any nausea or vomiting    Denies any chest pain or shortness of Breath    OBJECTIVE:  Current Weight: Weight - Scale: 107 kg (236 lb 12 8 oz)  Vitals:    12/21/19 0742   BP: 158/58   Pulse:    Resp: 18   Temp: 98 7 °F (37 1 °C)   SpO2:        Intake/Output Summary (Last 24 hours) at 12/21/2019 1431  Last data filed at 12/21/2019 1205  Gross per 24 hour   Intake 780 ml   Output 500 ml   Net 280 ml     General: conscious, cooperative, in not acute distress  Eyes: conjunctivae pale, anicteric sclerae  ENT: lips and mucous membranes moist  Neck: supple, no JVD  Chest: clear breath sounds bilateral, no crackles, ronchus or wheezings  CVS: distinct S1 & S2, normal rate, regular rhythm  Abdomen: soft, non-tender, non-distended, normoactive bowel sounds  Extremities: no significant edema of both legs  Skin: no rash  Neuro: awake, alert, oriented        Medications:    Current Facility-Administered Medications:     acetaminophen (TYLENOL) tablet 650 mg, 650 mg, Oral, Q6H PRN, Kourtney E Held, PA-C, 650 mg at 12/20/19 0531    aluminum-magnesium hydroxide-simethicone (MYLANTA) 200-200-20 mg/5 mL oral suspension 30 mL, 30 mL, Oral, Q4H PRN, Kourtney E Held, PA-C, 30 mL at 12/11/19 0148    amLODIPine (NORVASC) tablet 10 mg, 10 mg, Oral, Daily, Melissa Lyle MD, 10 mg at 12/21/19 0951    ARIPiprazole (ABILIFY) tablet 30 mg, 30 mg, Oral, HS, Jetty Jubilee, DO, 30 mg at 12/20/19 2159    aspirin chewable tablet 81 mg, 81 mg, Oral, Daily, Jetty Jubilee, DO, 81 mg at 12/21/19 0951    busPIRone (BUSPAR) tablet 5 mg, 5 mg, Oral, BID, Jetty Jubilee, DO, 5 mg at 12/21/19 9953    cholecalciferol (VITAMIN D3) tablet 3,000 Units, 3,000 Units, Oral, Daily, Jetty Jubilee, DO, 3,000 Units at 12/21/19 0950    cholestyramine sugar free (QUESTRAN LIGHT) packet 4 g, 4 g, Oral, BID With Meals, Rosie Dennis MD, 4 g at 12/21/19 0955    cloNIDine (CATAPRES) tablet 0 2 mg, 0 2 mg, Oral, Q8H [facility], Anant Taylor MD, 0 2 mg at 12/21/19 1401    dicyclomine (BENTYL) capsule 10 mg, 10 mg, Oral, TID PRN, Saumya Triplett MD, 10 mg at 12/21/19 1210    doxazosin (CARDURA) tablet 2 mg, 2 mg, Oral, HS, Anant Taylor MD, 2 mg at 12/20/19 2200    DULoxetine (CYMBALTA) delayed release capsule 60 mg, 60 mg, Oral, BID, Jetty Jubilee, DO, 60 mg at 12/21/19 4574    ferrous sulfate tablet 325 mg, 325 mg, Oral, Daily With Breakfast, Jetty Jubilee, DO, 325 mg at 97/13/13 3289    folic acid (FOLVITE) tablet 1,000 mcg, 1,000 mcg, Oral, Daily, Jetty Jubilee, DO, 1,000 mcg at 12/21/19 0951    heparin (porcine) subcutaneous injection 5,000 Units, 5,000 Units, Subcutaneous, Q8H [facility], 5,000 Units at 12/21/19 1402 **AND** [CANCELED] Platelet count, , , Once, Laith Marshall DO    hydrALAZINE (APRESOLINE) injection 5 mg, 5 mg, Intravenous, Q6H PRN, Reji Long MD, 5 mg at 12/16/19 1733    hydrALAZINE (APRESOLINE) tablet 50 mg, 50 mg, Oral, Q8H [address], Cachorro Donald MD, 50 mg at 12/21/19 1402    insulin lispro (HumaLOG) 100 units/mL subcutaneous injection 1-5 Units, 1-5 Units, Subcutaneous, HS, Laith Marshall DO    insulin lispro (HumaLOG) 100 units/mL subcutaneous injection 1-6 Units, 1-6 Units, Subcutaneous, TID AC, 5 Units at 12/21/19 0755 **AND** Fingerstick Glucose (POCT), , , 4x Daily AC and at bedtime, Reji Long MD    levothyroxine tablet 125 mcg, 125 mcg, Oral, Early Morning, Laith Marshall DO, 125 mcg at 12/21/19 0603    LORazepam (ATIVAN) tablet 2 mg, 2 mg, Oral, TID PRN, Latih Marshall DO, 2 mg at 12/20/19 1305    metoprolol tartrate (LOPRESSOR) tablet 50 mg, 50 mg, Oral, Q12H [address], Laith Marshall DO, 50 mg at 12/21/19 0951    ondansetron (ZOFRAN) injection 4 mg, 4 mg, Intravenous, Q4H PRN, Jeffrey Butterfield MD, 4 mg at 12/21/19 0600    potassium chloride (K-DUR,KLOR-CON) CR tablet 40 mEq, 40 mEq, Oral, Once, Johanna Lawson MD    pravastatin (PRAVACHOL) tablet 80 mg, 80 mg, Oral, Daily, Laith Marshall DO, 80 mg at 12/21/19 0950    predniSONE tablet 5 mg, 5 mg, Oral, Daily, Laith Marshall DO, 5 mg at 12/21/19 3988    rOPINIRole (REQUIP) tablet 0 25 mg, 0 25 mg, Oral, BID, Laith Marshall DO, 0 25 mg at 12/21/19 3913    saccharomyces boulardii (FLORASTOR) capsule 250 mg, 250 mg, Oral, BID, Laith Marshall DO, 250 mg at 12/21/19 0950    sertraline (ZOLOFT) tablet 200 mg, 200 mg, Oral, Daily, Laith Marshall DO, 200 mg at 12/21/19 1378    sevelamer (RENAGEL) tablet 800 mg, 800 mg, Oral, TID With Meals, Laith Marshall DO, 800 mg at 12/21/19 1225    sodium bicarbonate 150 mEq in dextrose 5 % 1,000 mL infusion, 75 mL/hr, Intravenous, Continuous, Jean Francois MD    sodium bicarbonate tablet 1,300 mg, 1,300 mg, Oral, BID after meals, Cesar Spain MD, 1,300 mg at 12/21/19 0951    tacrolimus (PROGRAF) capsule 2 mg, 2 mg, Oral, Daily, Seamus Fu DO, 2 mg at 12/21/19 1217    tacrolimus (PROGRAF) capsule 2 mg, 2 mg, Oral, HS, Cesar Spain MD, 2 mg at 12/20/19 2203    Invasive Devices:        Lab Results:   Results from last 7 days   Lab Units 12/21/19  0918 12/19/19  0838 12/18/19  0909 12/18/19  0845  12/15/19  0431   WBC Thousand/uL 8 54 6 59 6 12  --    < > 7 02   HEMOGLOBIN g/dL 7 6* 7 7* 7 6*  --    < > 7 3*   HEMATOCRIT % 24 7* 24 7* 24 9*  --    < > 23 9*   PLATELETS Thousands/uL 163 172 163  --    < > 154   SODIUM mmol/L 142 142  --  142   < > 141   POTASSIUM mmol/L 3 3* 3 5  --  3 3*   < > 3 4*   CHLORIDE mmol/L 119* 117*  --  117*   < > 115*   CO2 mmol/L 15* 18*  --  16*   < > 19*   BUN mg/dL 32* 30*  --  32*   < > 43*   CREATININE mg/dL 2 70* 2 56*  --  2 55*   < > 2 63*   CALCIUM mg/dL 8 0* 8 1*  --  8 4   < > 8 2*   MAGNESIUM mg/dL 2 4  --   --   --   --   --    PHOSPHORUS mg/dL 5 0*  --   --   --   --   --    ALK PHOS U/L 95  --   --   --   --  91   ALT U/L 15  --   --   --   --  18   AST U/L 13  --   --   --   --  11    < > = values in this interval not displayed  Portions of the record may have been created with voice recognition software  Occasional wrong word or "sound a like" substitutions may have occurred due to the inherent limitations of voice recognition software  Read the chart carefully and recognize, using context, where substitutions have occurred  If you have any questions, please contact the dictating provider

## 2019-12-22 LAB
ANION GAP SERPL CALCULATED.3IONS-SCNC: 5 MMOL/L (ref 4–13)
BUN SERPL-MCNC: 30 MG/DL (ref 5–25)
CALCIUM SERPL-MCNC: 8.3 MG/DL (ref 8.3–10.1)
CHLORIDE SERPL-SCNC: 119 MMOL/L (ref 100–108)
CO2 SERPL-SCNC: 17 MMOL/L (ref 21–32)
CREAT SERPL-MCNC: 2.52 MG/DL (ref 0.6–1.3)
GFR SERPL CREATININE-BSD FRML MDRD: 21 ML/MIN/1.73SQ M
GLUCOSE SERPL-MCNC: 109 MG/DL (ref 65–140)
GLUCOSE SERPL-MCNC: 116 MG/DL (ref 65–140)
GLUCOSE SERPL-MCNC: 126 MG/DL (ref 65–140)
GLUCOSE SERPL-MCNC: 129 MG/DL (ref 65–140)
GLUCOSE SERPL-MCNC: 149 MG/DL (ref 65–140)
POTASSIUM SERPL-SCNC: 3.4 MMOL/L (ref 3.5–5.3)
SODIUM SERPL-SCNC: 141 MMOL/L (ref 136–145)

## 2019-12-22 PROCEDURE — 82948 REAGENT STRIP/BLOOD GLUCOSE: CPT

## 2019-12-22 PROCEDURE — 99232 SBSQ HOSP IP/OBS MODERATE 35: CPT | Performed by: INTERNAL MEDICINE

## 2019-12-22 PROCEDURE — 80048 BASIC METABOLIC PNL TOTAL CA: CPT | Performed by: INTERNAL MEDICINE

## 2019-12-22 RX ORDER — LOPERAMIDE HYDROCHLORIDE 2 MG/1
2 CAPSULE ORAL 3 TIMES DAILY PRN
Status: DISCONTINUED | OUTPATIENT
Start: 2019-12-22 | End: 2019-12-26

## 2019-12-22 RX ORDER — POTASSIUM CHLORIDE 20 MEQ/1
40 TABLET, EXTENDED RELEASE ORAL ONCE
Status: COMPLETED | OUTPATIENT
Start: 2019-12-22 | End: 2019-12-22

## 2019-12-22 RX ORDER — DICYCLOMINE HYDROCHLORIDE 10 MG/1
20 CAPSULE ORAL 3 TIMES DAILY PRN
Status: DISCONTINUED | OUTPATIENT
Start: 2019-12-22 | End: 2019-12-26

## 2019-12-22 RX ADMIN — HYDRALAZINE HYDROCHLORIDE 50 MG: 50 TABLET, FILM COATED ORAL at 05:34

## 2019-12-22 RX ADMIN — CLONIDINE HYDROCHLORIDE 0.2 MG: 0.1 TABLET ORAL at 14:47

## 2019-12-22 RX ADMIN — HEPARIN SODIUM 5000 UNITS: 5000 INJECTION INTRAVENOUS; SUBCUTANEOUS at 05:32

## 2019-12-22 RX ADMIN — SODIUM BICARBONATE 650 MG TABLET 1300 MG: at 17:08

## 2019-12-22 RX ADMIN — ROPINIROLE 0.25 MG: 0.25 TABLET, FILM COATED ORAL at 09:28

## 2019-12-22 RX ADMIN — PREDNISONE 5 MG: 5 TABLET ORAL at 09:28

## 2019-12-22 RX ADMIN — TACROLIMUS 2 MG: 1 CAPSULE ORAL at 09:34

## 2019-12-22 RX ADMIN — HYDRALAZINE HYDROCHLORIDE 50 MG: 50 TABLET, FILM COATED ORAL at 21:07

## 2019-12-22 RX ADMIN — MELATONIN 3000 UNITS: at 09:28

## 2019-12-22 RX ADMIN — ASPIRIN 81 MG 81 MG: 81 TABLET ORAL at 09:28

## 2019-12-22 RX ADMIN — SODIUM BICARBONATE 650 MG TABLET 1300 MG: at 09:27

## 2019-12-22 RX ADMIN — SEVELAMER HYDROCHLORIDE 800 MG: 800 TABLET, FILM COATED PARENTERAL at 17:48

## 2019-12-22 RX ADMIN — DOXAZOSIN 2 MG: 2 TABLET ORAL at 21:04

## 2019-12-22 RX ADMIN — SEVELAMER HYDROCHLORIDE 800 MG: 800 TABLET, FILM COATED PARENTERAL at 09:27

## 2019-12-22 RX ADMIN — HYDRALAZINE HYDROCHLORIDE 50 MG: 50 TABLET, FILM COATED ORAL at 14:47

## 2019-12-22 RX ADMIN — DULOXETINE HYDROCHLORIDE 60 MG: 60 CAPSULE, DELAYED RELEASE ORAL at 09:27

## 2019-12-22 RX ADMIN — POTASSIUM CHLORIDE 40 MEQ: 1500 TABLET, EXTENDED RELEASE ORAL at 14:47

## 2019-12-22 RX ADMIN — ARIPIPRAZOLE 30 MG: 10 TABLET ORAL at 21:04

## 2019-12-22 RX ADMIN — LEVOTHYROXINE SODIUM 125 MCG: 125 TABLET ORAL at 05:32

## 2019-12-22 RX ADMIN — ONDANSETRON 4 MG: 2 INJECTION INTRAMUSCULAR; INTRAVENOUS at 05:32

## 2019-12-22 RX ADMIN — HEPARIN SODIUM 5000 UNITS: 5000 INJECTION INTRAVENOUS; SUBCUTANEOUS at 14:47

## 2019-12-22 RX ADMIN — Medication 250 MG: at 09:27

## 2019-12-22 RX ADMIN — Medication 250 MG: at 17:48

## 2019-12-22 RX ADMIN — CLONIDINE HYDROCHLORIDE 0.2 MG: 0.1 TABLET ORAL at 05:34

## 2019-12-22 RX ADMIN — TACROLIMUS 2 MG: 1 CAPSULE ORAL at 21:01

## 2019-12-22 RX ADMIN — AMLODIPINE BESYLATE 10 MG: 10 TABLET ORAL at 09:28

## 2019-12-22 RX ADMIN — DULOXETINE HYDROCHLORIDE 60 MG: 60 CAPSULE, DELAYED RELEASE ORAL at 17:47

## 2019-12-22 RX ADMIN — CLONIDINE HYDROCHLORIDE 0.2 MG: 0.1 TABLET ORAL at 21:05

## 2019-12-22 RX ADMIN — ROPINIROLE 0.25 MG: 0.25 TABLET, FILM COATED ORAL at 17:47

## 2019-12-22 RX ADMIN — LOPERAMIDE HYDROCHLORIDE 2 MG: 2 CAPSULE ORAL at 16:43

## 2019-12-22 RX ADMIN — METOPROLOL TARTRATE 50 MG: 50 TABLET, FILM COATED ORAL at 21:06

## 2019-12-22 RX ADMIN — PRAVASTATIN SODIUM 80 MG: 80 TABLET ORAL at 09:27

## 2019-12-22 RX ADMIN — METOPROLOL TARTRATE 50 MG: 50 TABLET, FILM COATED ORAL at 09:28

## 2019-12-22 RX ADMIN — SEVELAMER HYDROCHLORIDE 800 MG: 800 TABLET, FILM COATED PARENTERAL at 14:46

## 2019-12-22 RX ADMIN — ONDANSETRON 4 MG: 2 INJECTION INTRAMUSCULAR; INTRAVENOUS at 10:14

## 2019-12-22 RX ADMIN — SODIUM BICARBONATE 75 ML/HR: 84 INJECTION, SOLUTION INTRAVENOUS at 10:14

## 2019-12-22 RX ADMIN — BUSPIRONE HYDROCHLORIDE 5 MG: 5 TABLET ORAL at 17:47

## 2019-12-22 RX ADMIN — LOPERAMIDE HYDROCHLORIDE 2 MG: 2 CAPSULE ORAL at 21:06

## 2019-12-22 RX ADMIN — FERROUS SULFATE TAB 325 MG (65 MG ELEMENTAL FE) 325 MG: 325 (65 FE) TAB at 09:28

## 2019-12-22 RX ADMIN — HEPARIN SODIUM 5000 UNITS: 5000 INJECTION INTRAVENOUS; SUBCUTANEOUS at 21:07

## 2019-12-22 RX ADMIN — ONDANSETRON 4 MG: 2 INJECTION INTRAMUSCULAR; INTRAVENOUS at 21:01

## 2019-12-22 RX ADMIN — BUSPIRONE HYDROCHLORIDE 5 MG: 5 TABLET ORAL at 09:28

## 2019-12-22 RX ADMIN — DICYCLOMINE HYDROCHLORIDE 20 MG: 10 CAPSULE ORAL at 16:42

## 2019-12-22 RX ADMIN — ONDANSETRON 4 MG: 2 INJECTION INTRAMUSCULAR; INTRAVENOUS at 14:53

## 2019-12-22 RX ADMIN — FOLIC ACID 1000 MCG: 1 TABLET ORAL at 09:28

## 2019-12-22 RX ADMIN — SERTRALINE HYDROCHLORIDE 200 MG: 100 TABLET ORAL at 09:27

## 2019-12-22 NOTE — ASSESSMENT & PLAN NOTE
· Recently completed 7 D course of IV daptomycin for VRE UTI    Monitoring off antibiotic therapy  · Has positive cultures for VRE again, but this is not the cause of her symptoms, its colonization  · Monitor off Abx

## 2019-12-22 NOTE — ASSESSMENT & PLAN NOTE
· Creatinine has been relatively stable  Continue to monitor with serial laboratories  · Monitor volume status, not all UO documented  · Monitor BMP while inpatient  · Avoid nephrotoxins as able, renally dose medications as indicated  · Reviewed with Nephrology  Patient with bicarb losses due to the increased bowel movements bicarbonate drip hanging  Continue to monitor laboratories in the a m

## 2019-12-22 NOTE — ASSESSMENT & PLAN NOTE
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

## 2019-12-22 NOTE — ASSESSMENT & PLAN NOTE
· Follows with Dr Rachel Humphries as outpatient  · Continue Revlimid at home dosage  · Monitor hb, running low  · H/o transfusions, hemoglobin has remained stable at 7 6  Will continue hold on transfusion as she has multiple autoantibodies noted  Patient remains stable in the mid sevens    Will have her follow up shortly after discharge from hospital

## 2019-12-22 NOTE — PROGRESS NOTES
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, with post infectious IBS  Patient not tolerating Janell Severance  Will check with ID on call in am due to persistent mucous and watery diarrhea, now causing bicarb loses and dehydration  Currently , treating symptomatically  · Continue bicarb drip  · Readdress with ID in the am      Acute metabolic encephalopathy  Assessment & Plan  Patient back to baseline  Neuropsychiatric evaluation shows she has capacity for decision making  Remains cognitively intact  Diarrhea  Assessment & Plan  Working diagnosis was a viral enterocolitis  Patient took Questran but cause crampy abdominal pain with more formed stool  She is now refusing teased Questran for due to the discomfort  Will check with ID in the a m  Regarding the organism found and the blood which is in the Bacteroides family and could be related to diarrhea  For now continue to monitor off antibiotics    Hypokalemia  Assessment & Plan  GI loss, continuing to replete  Started on bicarbonate drip today due to bicarb losses  Will recheck with ID in the a m  And consider GI input  Serial laboratories to monitor  * Enterocolitis  Assessment & Plan  · With history of recurrent UTIs in setting of renal and pancreatic transplant  Most recently with VRE at recent admission to [facility]   Presenting with abdominal pain/nausea and lethargy/confusion  · CT abdomen/pelvis showing colitis  · Stool cdiff neg x2, enteric panel neg, leucocytes neg  · Checked CMV PCR - negative  · Abdominal pain improved still with mucoid material   Agree with ID this likely should be monitored conservatively without antibiotic therapy  Avoidance of lactose may be helpful  Questran being added due to continued mucoid material     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 [facility])  Assessment & Plan  Lab Results   Component Value Date    HGBA1C 5 1 09/09/2019       Recent Labs     12/20/19  2100 12/21/19  0803 12/21/19  1102 12/21/19  1646   POCGLU 122 344* 93 110       Blood Sugar Average: Last 72 hrs:  · (P) 131 6 stopped Lantus (in place of Toujeo) 5 units qHS comma will continue to monitor  · Cont SSI with accu-cheks and carb controlled diet  · Initiate hypoglycemia protocol and avoid hypoglycemia    Essential hypertension  Assessment & Plan  Blood pressures have remained variable  Patient currently on clonidine t i d  And Cardura  Norvasc was discontinued  Continue to monitor  Blood pressures are now trending upwards  May need to re- add low-dose Norvasc  Will continue to monitor and decide  Multiple myeloma not having achieved remission [facility])  Assessment & Plan  · Follows with Dr Liz Sheriff as outpatient  · Continue Revlimid at home dosage  · Monitor hb, running low  · H/o transfusions, hemoglobin has remained stable at 7 6  Will continue hold on transfusion as she has multiple autoantibodies noted  Patient remains stable in the mid sevens  Will have her follow up shortly after discharge from hospital     Chronic kidney disease, stage 4 (severe) (HCC)  Assessment & Plan  · Creatinine has been relatively stable  Continue to monitor with serial laboratories  · Monitor volume status, not all UO documented  · Monitor BMP while inpatient  · Avoid nephrotoxins as able, renally dose medications as indicated  · Reviewed with Nephrology  Patient with bicarb losses due to the increased bowel movements bicarbonate drip hanging  Continue to monitor laboratories in the a m      Renal transplant recipient  Assessment & Plan  · On chronic immunosuppression with tacrolimus and prednisone  · Unfortunately patient noncompliant with all medications since discharge including these  · Tacrolimus level was 4 > 10 > 7 (12/12) > 5 (12/14) remains stable at 4 4  · dose decreased to 2 BID on 12/12, again decrease to 2 mg QAM & 1 5 mg HS from 12/14  · Tacrolimus level was stable on the current dosing  Nephro requested reorder  · Last level 4 4    Metabolic acidosis  Assessment & Plan  · Appears acute on chronic  · S/P kidney and pancreatic transplant in 1998  · continue p o  and trend BMP  · Nephrology note appreciated  Patient with an RTA related to her pancreatic transplant  Low-dose bicarb drip restarted  Monitor laboratories in a m  Asymptomatic bacteriuria  Assessment & Plan  · Recently completed 7 D course of IV daptomycin for VRE UTI  Monitoring off antibiotic therapy  · Has positive cultures for VRE again, but this is not the cause of her symptoms, its colonization  · Monitor off Abx          VTE Pharmacologic Prophylaxis:   Pharmacologic: Heparin  Mechanical: Mechanical VTE prophylaxis in place  Patient Centered Rounds: Updated nursing on walking rounds  Discussions with Specialists or Other Care Team Provider:  Nephrology  Education and Discussions with Family / Patient:  Updated patient  Time Spent for Care: 30 minutes  If More than 50% of total time spent on counseling and coordination of care as described above indicate yes or no and described the counseling and coordination:  No    Current Length of Stay: 11 day(s)  Current Patient Status: Inpatient   Certification Statement: The patient will continue to require additional inpatient hospital stay due to Bicarbonate wasting with diarrhea  Discharge Plan: To be determined  Code Status: Level 1 - Full Code    Subjective:   Patient states still having some cramping  Multiple stools liquid with some formed elements    States Questran caused her to have severe cramping    Objective:   Vitals:   Temp (24hrs), Av 6 °F (37 °C), Min:98 5 °F (36 9 °C), Max:98 7 °F (37 1 °C)    Temp:  [98 5 °F (36 9 °C)-98 7 °F (37 1 °C)] 98 5 °F (36 9 °C)  Resp:  [16-18] 16  BP: (153-160)/(58-61) 160/59  Body mass index is 36 01 kg/m²  Input and Output Summary (last 24 hours): Intake/Output Summary (Last 24 hours) at 2019  Last data filed at 2019 1600  Gross per 24 hour   Intake 780 ml   Output 575 ml   Net 205 ml       Physical Exam:  Generally overweight female no acute distress normocephalic atraumatic pupils equal round and reactive to light extraocular muscles intact mucous membranes are moist neck is supple there is no JVD no lymph nodes no carotid bruits chest is decreased but clear to auscultation is no rhonchi rales or wheezes  Cardiovascular regular rate rhythm positive S1 and S2 heard appreciate an S3-S4  Abdomen obese soft nontender nondistended with positive bowel sounds no hepatosplenomegaly no guarding or rebound  Neurologically awake alert oriented cranial nerves 2-12 intact    Physical Exam    Additional Data:   Labs:  Results from last 7 days   Lab Units 19  0918   WBC Thousand/uL 8 54   HEMOGLOBIN g/dL 7 6*   HEMATOCRIT % 24 7*   PLATELETS Thousands/uL 163   NEUTROS PCT % 71   LYMPHS PCT % 14   MONOS PCT % 9   EOS PCT % 4     Results from last 7 days   Lab Units 19  0918   POTASSIUM mmol/L 3 3*   CHLORIDE mmol/L 119*   CO2 mmol/L 15*   BUN mg/dL 32*   CREATININE mg/dL 2 70*   CALCIUM mg/dL 8 0*   ALK PHOS U/L 95   ALT U/L 15   AST U/L 13           * I Have Reviewed All Lab Data Listed Above  * Additional Pertinent Lab Tests Reviewed: All Labs Within Last 24 Hours Reviewed    Imaging:    Imaging Reports Reviewed Today Include:  None    Cultures:   Blood Culture:   Lab Results   Component Value Date    BLOODCX No Growth After 5 Days   2019    BLOODCX Porphyromonas asaccharolytica (A) 2019    BLOODCX Porphyromonas asaccharolytica (A) 2019    BLOODCX No Growth After 5 Days  10/01/2019    BLOODCX No Growth After 5 Days  10/01/2019    BLOODCX No Growth After 5 Days  11/16/2017    BLOODCX No Growth After 5 Days   09/13/2017     Urine Culture:   Lab Results   Component Value Date    URINECX (A) 12/09/2019     >100,000 cfu/ml Vancomycin Resistant Enterococcus faecium    URINECX (A) 12/09/2019     1192-3668 cfu/ml Gram-negative cintia- species    URINECX (A) 11/24/2019     >100,000 cfu/ml Vancomycin Resistant Enterococcus faecium    URINECX No Growth <1000 cfu/mL 11/05/2019    URINECX <10,000 cfu/ml  11/04/2019    URINECX >100,000 cfu/ml Escherichia coli (A) 10/23/2019    URINECX 10,000-19,000 cfu/ml  10/23/2019     Sputum Culture: No components found for: SPUTUMCX  Wound Culture: No results found for: WOUNDCULT    Last 24 Hours Medication List:     Current Facility-Administered Medications:  acetaminophen 650 mg Oral Q6H PRN Kourtney Ortiz PA-C    aluminum-magnesium hydroxide-simethicone 30 mL Oral Q4H PRN Kourtney Ortiz PA-C    amLODIPine 10 mg Oral Daily Frank Lerma MD    ARIPiprazole 30 mg Oral HS Lulú Vancouver, DO    aspirin 81 mg Oral Daily Lulú Aspen, DO    busPIRone 5 mg Oral BID Lulú Aspen, DO    cholecalciferol 3,000 Units Oral Daily Lulú Aspen, DO    cloNIDine 0 2 mg Oral Q8H [address] Deangelo Matt MD    dicyclomine 10 mg Oral TID PRN Frank Lerma MD    doxazosin 2 mg Oral HS Deangelo Matt MD    DULoxetine 60 mg Oral BID Lulú Aspen, DO    ferrous sulfate 325 mg Oral Daily With Breakfast Lulú Aspen, DO    folic acid 1,359 mcg Oral Daily Lulú Aspen, DO    heparin (porcine) 5,000 Units Subcutaneous [facility] Lulú Vancouver, DO    hydrALAZINE 5 mg Intravenous Q6H PRN Frank Lerma MD    hydrALAZINE 50 mg Oral Q8H [address] Deangelo Matt MD    insulin lispro 1-5 Units Subcutaneous HS Lulú Aspen, DO    insulin lispro 1-6 Units Subcutaneous TID AC Frank Lerma MD    levothyroxine 125 mcg Oral Early Morning Shanel Ordonez Mayelin, DO    LORazepam 2 mg Oral TID PRN [facility], DO    metoprolol tartrate 50 mg Oral Q12H [address] Latham Massy, DO    ondansetron 4 mg Intravenous Q4H PRN Dennis Campos MD    pravastatin 80 mg Oral Daily [facility], DO    predniSONE 5 mg Oral Daily [facility], DO    rOPINIRole 0 25 mg Oral BID [facility], DO    saccharomyces boulardii 250 mg Oral BID [facility], DO    sertraline 200 mg Oral Daily [facility], DO    sevelamer 800 mg Oral TID With Meals [facility], DO    sodium bicarbonate infusion 75 mL/hr Intravenous Continuous Brianna Oswald MD Last Rate: 75 mL/hr (12/21/19 1628)   sodium bicarbonate 1,300 mg Oral BID after meals Lazaro Méndez MD    tacrolimus 2 mg Oral Daily Seamus Fu, DO    tacrolimus 2 mg Oral HS Lazaro Méndez MD         Today, Patient Was Seen By: Jerry Amador MD    ** Please Note: Dragon 360 Dictation voice to text software may have been used in the creation of this document   **

## 2019-12-22 NOTE — ASSESSMENT & PLAN NOTE
Patient back to baseline  Neuropsychiatric evaluation shows she has capacity for decision making  Remains cognitively intact

## 2019-12-22 NOTE — ASSESSMENT & PLAN NOTE
Lab Results   Component Value Date    HGBA1C 5 1 09/09/2019       Recent Labs     12/20/19  2100 12/21/19  0803 12/21/19  1102 12/21/19  1646   POCGLU 122 344* 93 110       Blood Sugar Average: Last 72 hrs:  · (P) 131 6 stopped Lantus (in place of Toujeo) 5 units qHS comma will continue to monitor    · Cont SSI with accu-cheks and carb controlled diet  · Initiate hypoglycemia protocol and avoid hypoglycemia

## 2019-12-22 NOTE — ASSESSMENT & PLAN NOTE
GI loss, continuing to replete  Started on bicarbonate drip today due to bicarb losses  Will recheck with ID in the a m  And consider GI input  Serial laboratories to monitor

## 2019-12-22 NOTE — ASSESSMENT & PLAN NOTE
Working diagnosis was a viral enterocolitis  Patient took Questran but cause crampy abdominal pain with more formed stool  She is now refusing teased Questran for due to the discomfort  Will check with ID in the a m  Regarding the organism found and the blood which is in the Bacteroides family and could be related to diarrhea    For now continue to monitor off antibiotics

## 2019-12-22 NOTE — PROGRESS NOTES
NEPHROLOGY PROGRESS NOTE   Jo Villanueva 53 y o  female MRN: [id number]  Unit/Bed#: -01 Encounter: [id number]      ASSESSMENT & PLAN:  1  Chronic kidney disease, Status post kidney pancreas transplant with baseline creatinine in the mid 2s, pancreas failed  Allograft function is stable, creatinine went down to 2 5 from 2 7 yesterday  Patient states continue with diarrhea and poor p o  Intake  Continue with bicarb drip for another 24 hours  Avoid hypotension  2  Anaerobic Gram-negative bacteremia, ID on board, suspected secondary to GI translocation  3  Acute enterocolitis, repeat C diff was negative  4  Asymptomatic bacteriuria  5  Metabolic acidosis, in the setting of advanced kidney disease as well as GI losses  Patient is currently on bicarb tablets  Continue with bicarb drip for another 24 hours    6  Hypokalemia secondary to GI losses  I have ordered 40 mEq of potassium p o  Today and follow labs in the morning  7  History of multiple myeloma, Hematology on board  8  History of frequent UTIs, VRE status  My plan and recommendation were discussed with Dr Lilian Owens from Internal Medicine team    SUBJECTIVE:  Patient seen and examined, denies any chest pain or shortness of breath, states that continue with diarrhea with abdominal cramps, poor appetite, denies any nausea vomiting    She would like to be seen by a GI specialist for her persistent diarrhea    OBJECTIVE:  Current Weight: Weight - Scale: 107 kg (236 lb 12 8 oz)  Vitals:    12/22/19 1100   BP: 160/59   Pulse:    Resp: 19   Temp: 98 3 °F (36 8 °C)   SpO2:        Intake/Output Summary (Last 24 hours) at 12/22/2019 1312  Last data filed at 12/22/2019 4932  Gross per 24 hour   Intake 480 ml   Output 475 ml   Net 5 ml     General: conscious, cooperative, in not acute distress  Eyes: conjunctivae pale, anicteric sclerae  ENT: lips and mucous membranes moist  Neck: supple, no JVD  Chest: clear breath sounds bilateral, no crackles, ronchus or wheezings  CVS: distinct S1 & S2, normal rate, regular rhythm  Abdomen: soft, non-tender, non-distended, normoactive bowel sounds  Extremities: no significant edema of both legs  Skin: no rash  Neuro: awake, alert, oriented      Medications:    Current Facility-Administered Medications:     acetaminophen (TYLENOL) tablet 650 mg, 650 mg, Oral, Q6H PRN, Kourtney E Held, PA-C, 650 mg at 12/20/19 0531    aluminum-magnesium hydroxide-simethicone (MYLANTA) 200-200-20 mg/5 mL oral suspension 30 mL, 30 mL, Oral, Q4H PRN, Kourtney E Held, PA-C, 30 mL at 12/11/19 0148    amLODIPine (NORVASC) tablet 10 mg, 10 mg, Oral, Daily, Melissa Lyle MD, 10 mg at 12/22/19 0928    ARIPiprazole (ABILIFY) tablet 30 mg, 30 mg, Oral, HS, Laith Marshall DO, 30 mg at 12/21/19 2210    aspirin chewable tablet 81 mg, 81 mg, Oral, Daily, Laith Marshall DO, 81 mg at 12/22/19 3440    busPIRone (BUSPAR) tablet 5 mg, 5 mg, Oral, BID, Laith Marshall DO, 5 mg at 12/22/19 7604    cholecalciferol (VITAMIN D3) tablet 3,000 Units, 3,000 Units, Oral, Daily, Laith Marshall, DO, 3,000 Units at 12/22/19 0603    cloNIDine (CATAPRES) tablet 0 2 mg, 0 2 mg, Oral, Q8H [facility], Cachorro Donald MD, 0 2 mg at 12/22/19 0534    dicyclomine (BENTYL) capsule 20 mg, 20 mg, Oral, TID PRN, Beth Bazzi MD    doxazosin (CARDURA) tablet 2 mg, 2 mg, Oral, HS, Cachorro Donald MD, 2 mg at 12/21/19 2211    DULoxetine (CYMBALTA) delayed release capsule 60 mg, 60 mg, Oral, BID, Laith Marshall DO, 60 mg at 12/22/19 5894    ferrous sulfate tablet 325 mg, 325 mg, Oral, Daily With Breakfast, Laith Marshall DO, 325 mg at 08/09/76 8413    folic acid (FOLVITE) tablet 1,000 mcg, 1,000 mcg, Oral, Daily, Laith Marshall DO, 1,000 mcg at 12/22/19 6520    heparin (porcine) subcutaneous injection 5,000 Units, 5,000 Units, Subcutaneous, Q8H [facility], 5,000 Units at 12/22/19 0532 **AND** [CANCELED] Platelet count, , , Once, Laith Marshall DO   hydrALAZINE (APRESOLINE) injection 5 mg, 5 mg, Intravenous, Q6H PRN, Gloria Queen MD, 5 mg at 12/16/19 1733    hydrALAZINE (APRESOLINE) tablet 50 mg, 50 mg, Oral, Q8H [facility], Tamela Bautista MD, 50 mg at 12/22/19 0534    insulin lispro (HumaLOG) 100 units/mL subcutaneous injection 1-5 Units, 1-5 Units, Subcutaneous, HS, Shannan Hylan, DO    insulin lispro (HumaLOG) 100 units/mL subcutaneous injection 1-6 Units, 1-6 Units, Subcutaneous, TID AC, 5 Units at 12/21/19 0755 **AND** Fingerstick Glucose (POCT), , , 4x Daily AC and at bedtime, Gloria Queen MD    levothyroxine tablet 125 mcg, 125 mcg, Oral, Early Morning, Shannan Hylan, DO, 125 mcg at 12/22/19 0532    loperamide (IMODIUM) capsule 2 mg, 2 mg, Oral, TID PRN, Rodolfo Shahid MD    LORazepam (ATIVAN) tablet 2 mg, 2 mg, Oral, TID PRN, Shannan Hylan, DO, 2 mg at 12/20/19 1305    metoprolol tartrate (LOPRESSOR) tablet 50 mg, 50 mg, Oral, Q12H [facility], Shannan Hylan, DO, 50 mg at 12/22/19 0928    ondansetron (ZOFRAN) injection 4 mg, 4 mg, Intravenous, Q4H PRN, Apryl Morris MD, 4 mg at 12/22/19 1014    potassium chloride (K-DUR,KLOR-CON) CR tablet 40 mEq, 40 mEq, Oral, Once, Garett Still MD    pravastatin (PRAVACHOL) tablet 80 mg, 80 mg, Oral, Daily, Shannan Hylan, DO, 80 mg at 12/22/19 9713    predniSONE tablet 5 mg, 5 mg, Oral, Daily, Shannan Hylan, DO, 5 mg at 12/22/19 3375    rOPINIRole (REQUIP) tablet 0 25 mg, 0 25 mg, Oral, BID, Shannan Hylan, DO, 0 25 mg at 12/22/19 8714    saccharomyces boulardii (FLORASTOR) capsule 250 mg, 250 mg, Oral, BID, Shannan Hylan, DO, 250 mg at 12/22/19 6506    sertraline (ZOLOFT) tablet 200 mg, 200 mg, Oral, Daily, Shannan Hylan, DO, 200 mg at 12/22/19 8202    sevelamer (RENAGEL) tablet 800 mg, 800 mg, Oral, TID With Meals, Shannan Hylan, DO, 800 mg at 12/22/19 0927    sodium bicarbonate 150 mEq in dextrose 5 % 1,000 mL infusion, 75 mL/hr, Intravenous, Continuous, Garett Still MD, Last Rate: 75 mL/hr at 12/22/19 1014, 75 mL/hr at 12/22/19 1014    sodium bicarbonate tablet 1,300 mg, 1,300 mg, Oral, BID after meals, Nayana Mace MD, 1,300 mg at 12/22/19 1515    tacrolimus (PROGRAF) capsule 2 mg, 2 mg, Oral, Daily, Blanca Regalado DO, 2 mg at 12/22/19 0934    tacrolimus (PROGRAF) capsule 2 mg, 2 mg, Oral, HS, Nayana Mace MD, 2 mg at 12/21/19 2210    Invasive Devices:        Lab Results:   Results from last 7 days   Lab Units 12/22/19  0602 12/21/19  0918 12/19/19  0838 12/18/19  0909   WBC Thousand/uL  --  8 54 6 59 6 12   HEMOGLOBIN g/dL  --  7 6* 7 7* 7 6*   HEMATOCRIT %  --  24 7* 24 7* 24 9*   PLATELETS Thousands/uL  --  163 172 163   SODIUM mmol/L 141 142 142  --    POTASSIUM mmol/L 3 4* 3 3* 3 5  --    CHLORIDE mmol/L 119* 119* 117*  --    CO2 mmol/L 17* 15* 18*  --    BUN mg/dL 30* 32* 30*  --    CREATININE mg/dL 2 52* 2 70* 2 56*  --    CALCIUM mg/dL 8 3 8 0* 8 1*  --    MAGNESIUM mg/dL  --  2 4  --   --    PHOSPHORUS mg/dL  --  5 0*  --   --    ALK PHOS U/L  --  95  --   --    ALT U/L  --  15  --   --    AST U/L  --  13  --   --        Portions of the record may have been created with voice recognition software  Occasional wrong word or "sound a like" substitutions may have occurred due to the inherent limitations of voice recognition software  Read the chart carefully and recognize, using context, where substitutions have occurred  If you have any questions, please contact the dictating provider

## 2019-12-22 NOTE — ASSESSMENT & PLAN NOTE
· With history of recurrent UTIs in setting of renal and pancreatic transplant  Most recently with VRE at recent admission to [facility]  Presenting with abdominal pain/nausea and lethargy/confusion  · CT abdomen/pelvis showing colitis  · Stool cdiff neg x2, enteric panel neg, leucocytes neg  · Checked CMV PCR - negative  · Abdominal pain improved still with mucoid material   Agree with ID this likely should be monitored conservatively without antibiotic therapy  Avoidance of lactose may be helpful    Questran being added due to continued mucoid material

## 2019-12-22 NOTE — ASSESSMENT & PLAN NOTE
· On chronic immunosuppression with tacrolimus and prednisone  · Unfortunately patient noncompliant with all medications since discharge including these  · Tacrolimus level was 4 > 10 > 7 (12/12) > 5 (12/14) remains stable at 4 4  · dose decreased to 2 BID on 12/12, again decrease to 2 mg QAM & 1 5 mg HS from 12/14  · Tacrolimus level was stable on the current dosing  Nephro requested reorder    · Last level 4 4

## 2019-12-22 NOTE — ASSESSMENT & PLAN NOTE
· Appears acute on chronic  · S/P kidney and pancreatic transplant in 1998  · continue p o  and trend BMP  · Nephrology note appreciated  Patient with an RTA related to her pancreatic transplant  Low-dose bicarb drip restarted  Monitor laboratories in a m

## 2019-12-23 LAB
ANION GAP SERPL CALCULATED.3IONS-SCNC: 5 MMOL/L (ref 4–13)
BUN SERPL-MCNC: 32 MG/DL (ref 5–25)
CALCIUM SERPL-MCNC: 8.1 MG/DL (ref 8.3–10.1)
CHLORIDE SERPL-SCNC: 117 MMOL/L (ref 100–108)
CO2 SERPL-SCNC: 20 MMOL/L (ref 21–32)
CREAT SERPL-MCNC: 2.61 MG/DL (ref 0.6–1.3)
ERYTHROCYTE [DISTWIDTH] IN BLOOD BY AUTOMATED COUNT: 17.5 % (ref 11.6–15.1)
GFR SERPL CREATININE-BSD FRML MDRD: 20 ML/MIN/1.73SQ M
GLUCOSE SERPL-MCNC: 102 MG/DL (ref 65–140)
GLUCOSE SERPL-MCNC: 107 MG/DL (ref 65–140)
GLUCOSE SERPL-MCNC: 109 MG/DL (ref 65–140)
GLUCOSE SERPL-MCNC: 125 MG/DL (ref 65–140)
GLUCOSE SERPL-MCNC: 143 MG/DL (ref 65–140)
HCT VFR BLD AUTO: 23.8 % (ref 34.8–46.1)
HGB BLD-MCNC: 7.4 G/DL (ref 11.5–15.4)
MCH RBC QN AUTO: 31.6 PG (ref 26.8–34.3)
MCHC RBC AUTO-ENTMCNC: 31.1 G/DL (ref 31.4–37.4)
MCV RBC AUTO: 102 FL (ref 82–98)
PLATELET # BLD AUTO: 158 THOUSANDS/UL (ref 149–390)
PMV BLD AUTO: 12.3 FL (ref 8.9–12.7)
POTASSIUM SERPL-SCNC: 3.3 MMOL/L (ref 3.5–5.3)
RBC # BLD AUTO: 2.34 MILLION/UL (ref 3.81–5.12)
SODIUM SERPL-SCNC: 142 MMOL/L (ref 136–145)
WBC # BLD AUTO: 7.89 THOUSAND/UL (ref 4.31–10.16)

## 2019-12-23 PROCEDURE — 93970 EXTREMITY STUDY: CPT | Performed by: SURGERY

## 2019-12-23 PROCEDURE — 85027 COMPLETE CBC AUTOMATED: CPT | Performed by: INTERNAL MEDICINE

## 2019-12-23 PROCEDURE — 82948 REAGENT STRIP/BLOOD GLUCOSE: CPT

## 2019-12-23 PROCEDURE — 80048 BASIC METABOLIC PNL TOTAL CA: CPT | Performed by: INTERNAL MEDICINE

## 2019-12-23 PROCEDURE — 97116 GAIT TRAINING THERAPY: CPT

## 2019-12-23 PROCEDURE — 99232 SBSQ HOSP IP/OBS MODERATE 35: CPT | Performed by: INTERNAL MEDICINE

## 2019-12-23 RX ORDER — CLONIDINE HYDROCHLORIDE 0.1 MG/1
0.2 TABLET ORAL EVERY 8 HOURS SCHEDULED
Status: DISCONTINUED | OUTPATIENT
Start: 2019-12-23 | End: 2020-01-02 | Stop reason: HOSPADM

## 2019-12-23 RX ORDER — SPIRONOLACTONE 25 MG/1
25 TABLET ORAL DAILY
Status: DISCONTINUED | OUTPATIENT
Start: 2019-12-24 | End: 2020-01-02 | Stop reason: HOSPADM

## 2019-12-23 RX ORDER — AMLODIPINE BESYLATE 10 MG/1
10 TABLET ORAL DAILY
Status: DISCONTINUED | OUTPATIENT
Start: 2019-12-24 | End: 2019-12-26

## 2019-12-23 RX ADMIN — LEVOTHYROXINE SODIUM 125 MCG: 125 TABLET ORAL at 06:17

## 2019-12-23 RX ADMIN — DOXAZOSIN 2 MG: 2 TABLET ORAL at 21:05

## 2019-12-23 RX ADMIN — TACROLIMUS 2 MG: 1 CAPSULE ORAL at 21:12

## 2019-12-23 RX ADMIN — MELATONIN 3000 UNITS: at 09:39

## 2019-12-23 RX ADMIN — LOPERAMIDE HYDROCHLORIDE 2 MG: 2 CAPSULE ORAL at 21:11

## 2019-12-23 RX ADMIN — PRAVASTATIN SODIUM 80 MG: 80 TABLET ORAL at 09:39

## 2019-12-23 RX ADMIN — BUSPIRONE HYDROCHLORIDE 5 MG: 5 TABLET ORAL at 18:05

## 2019-12-23 RX ADMIN — DICYCLOMINE HYDROCHLORIDE 20 MG: 10 CAPSULE ORAL at 14:25

## 2019-12-23 RX ADMIN — SODIUM BICARBONATE 650 MG TABLET 1300 MG: at 18:05

## 2019-12-23 RX ADMIN — ROPINIROLE 0.25 MG: 0.25 TABLET, FILM COATED ORAL at 18:05

## 2019-12-23 RX ADMIN — DICYCLOMINE HYDROCHLORIDE 20 MG: 10 CAPSULE ORAL at 21:11

## 2019-12-23 RX ADMIN — SEVELAMER HYDROCHLORIDE 800 MG: 800 TABLET, FILM COATED PARENTERAL at 13:25

## 2019-12-23 RX ADMIN — FERROUS SULFATE TAB 325 MG (65 MG ELEMENTAL FE) 325 MG: 325 (65 FE) TAB at 09:39

## 2019-12-23 RX ADMIN — HYDRALAZINE HYDROCHLORIDE 50 MG: 50 TABLET, FILM COATED ORAL at 21:05

## 2019-12-23 RX ADMIN — CLONIDINE HYDROCHLORIDE 0.2 MG: 0.1 TABLET ORAL at 14:25

## 2019-12-23 RX ADMIN — HYDRALAZINE HYDROCHLORIDE 50 MG: 50 TABLET, FILM COATED ORAL at 06:20

## 2019-12-23 RX ADMIN — PREDNISONE 5 MG: 5 TABLET ORAL at 09:40

## 2019-12-23 RX ADMIN — Medication 250 MG: at 18:05

## 2019-12-23 RX ADMIN — SODIUM BICARBONATE 75 ML/HR: 84 INJECTION, SOLUTION INTRAVENOUS at 02:15

## 2019-12-23 RX ADMIN — POTASSIUM CHLORIDE 30 MEQ: 750 TABLET, EXTENDED RELEASE ORAL at 18:05

## 2019-12-23 RX ADMIN — METOPROLOL TARTRATE 50 MG: 50 TABLET, FILM COATED ORAL at 09:39

## 2019-12-23 RX ADMIN — HYDRALAZINE HYDROCHLORIDE 50 MG: 50 TABLET, FILM COATED ORAL at 14:25

## 2019-12-23 RX ADMIN — HEPARIN SODIUM 5000 UNITS: 5000 INJECTION INTRAVENOUS; SUBCUTANEOUS at 21:05

## 2019-12-23 RX ADMIN — SEVELAMER HYDROCHLORIDE 800 MG: 800 TABLET, FILM COATED PARENTERAL at 09:39

## 2019-12-23 RX ADMIN — ASPIRIN 81 MG 81 MG: 81 TABLET ORAL at 09:39

## 2019-12-23 RX ADMIN — HEPARIN SODIUM 5000 UNITS: 5000 INJECTION INTRAVENOUS; SUBCUTANEOUS at 14:25

## 2019-12-23 RX ADMIN — CLONIDINE HYDROCHLORIDE 0.2 MG: 0.1 TABLET ORAL at 06:17

## 2019-12-23 RX ADMIN — DICYCLOMINE HYDROCHLORIDE 20 MG: 10 CAPSULE ORAL at 04:24

## 2019-12-23 RX ADMIN — SEVELAMER HYDROCHLORIDE 800 MG: 800 TABLET, FILM COATED PARENTERAL at 18:05

## 2019-12-23 RX ADMIN — AMLODIPINE BESYLATE 10 MG: 10 TABLET ORAL at 09:39

## 2019-12-23 RX ADMIN — DULOXETINE HYDROCHLORIDE 60 MG: 60 CAPSULE, DELAYED RELEASE ORAL at 18:05

## 2019-12-23 RX ADMIN — POTASSIUM CHLORIDE 30 MEQ: 750 TABLET, EXTENDED RELEASE ORAL at 21:04

## 2019-12-23 RX ADMIN — SERTRALINE HYDROCHLORIDE 200 MG: 100 TABLET ORAL at 09:40

## 2019-12-23 RX ADMIN — DULOXETINE HYDROCHLORIDE 60 MG: 60 CAPSULE, DELAYED RELEASE ORAL at 09:39

## 2019-12-23 RX ADMIN — CLONIDINE HYDROCHLORIDE 0.2 MG: 0.1 TABLET ORAL at 21:05

## 2019-12-23 RX ADMIN — HEPARIN SODIUM 5000 UNITS: 5000 INJECTION INTRAVENOUS; SUBCUTANEOUS at 06:17

## 2019-12-23 RX ADMIN — ROPINIROLE 0.25 MG: 0.25 TABLET, FILM COATED ORAL at 09:39

## 2019-12-23 RX ADMIN — TACROLIMUS 2 MG: 1 CAPSULE ORAL at 09:49

## 2019-12-23 RX ADMIN — BUSPIRONE HYDROCHLORIDE 5 MG: 5 TABLET ORAL at 09:39

## 2019-12-23 RX ADMIN — ARIPIPRAZOLE 30 MG: 10 TABLET ORAL at 21:05

## 2019-12-23 RX ADMIN — Medication 250 MG: at 09:39

## 2019-12-23 RX ADMIN — FOLIC ACID 1000 MCG: 1 TABLET ORAL at 09:40

## 2019-12-23 RX ADMIN — METOPROLOL TARTRATE 50 MG: 50 TABLET, FILM COATED ORAL at 21:05

## 2019-12-23 RX ADMIN — LOPERAMIDE HYDROCHLORIDE 2 MG: 2 CAPSULE ORAL at 14:25

## 2019-12-23 RX ADMIN — SODIUM BICARBONATE 650 MG TABLET 1300 MG: at 09:39

## 2019-12-23 RX ADMIN — LOPERAMIDE HYDROCHLORIDE 2 MG: 2 CAPSULE ORAL at 04:25

## 2019-12-23 NOTE — PLAN OF CARE
Problem: PHYSICAL THERAPY ADULT  Goal: Performs mobility at highest level of function for planned discharge setting  See evaluation for individualized goals  Description  Treatment/Interventions: Functional transfer training, LE strengthening/ROM, Therapeutic exercise, Endurance training, Elevations, Patient/family training, Equipment eval/education, Bed mobility, Gait training, Spoke to nursing  Equipment Recommended: Justine Quan       See flowsheet documentation for full assessment, interventions and recommendations  Outcome: Progressing  Note:   Prognosis: Good  Problem List: Decreased strength, Decreased endurance, Impaired balance, Decreased coordination, Decreased mobility, Decreased cognition, Impaired judgement, Decreased safety awareness, Pain  Assessment: Pt seen for session for setup, transfers, gait w/ rest time and repositioning  Pt cooperative, willing to mobilize  c/o pain in abdomen, as well as SOB and fatigue, during and p gait  Needs CGA/min A for gait for steadying  In speaking w/ pt, does not feel she is strong enough to d/c directly home, requests rehab at d/c   agree w/ same        Recommendation: Post acute IP rehab(see above- recommend rehab at d/c)     PT - OK to Discharge: (S) Yes(when stable to rehab- see above)    See flowsheet documentation for full assessment

## 2019-12-23 NOTE — ASSESSMENT & PLAN NOTE
· On chronic immunosuppression with tacrolimus and prednisone  · Unfortunately patient noncompliant with all medications since discharge including these  · Tacrolimus level was 4 > 10 > 7 (12/12) > 5 (12/14) remains stable at 4 4  · dose decreased to 2 BID on 12/12, again decrease to 2 mg QAM & 1 5 mg HS from 12/14  · Tacrolimus level was stable on the current dosing  Nephro requested reorder    · Last level 4 5

## 2019-12-23 NOTE — PROGRESS NOTES
Progress Note - Nephrology   Jeanine Domingo 53 y o  female MRN: [id number]  Unit/Bed#: -01 Encounter: [id number]    ASSESSMENT AND PLAN:  1  CKD stage 4:  Status post kidney pancreas transplant   -baseline creatinine in the mid 2s  -AK I:  From prerenal   -peak creatinine was 3 39  -current creatinine:  2 61 essentially at baseline  -tacrolimus level acceptable 5 4  Recommendations:  · Hep-Lock IV fluids as the patient's creatinine is at baseline as she is developing some slight fluid retention/edema  · Continue current immunosuppressive medications  · Recheck labs in a m  2  Hypertension:  Mildly elevated systolic blood pressures, however diastolics are low  Monitor for now with hold parameters  3  Electrolytes:  -hypokalemia:  Replete  -add spironolactone for the hypokalemia  -treat metabolic acidosis which is non-anion gap from the diarrhea  This is improving at 20  Continue sodium bicarbonate  4  Mineral bone disorder:  -borderline hyperphosphatemia:  Low phosphorus diet for now  I do not 1 add PhosLo given GI symptoms until this stable   -magnesium acceptable 2 4    5  Anemia:  -hemoglobin stable at 7 4  -history multiple myeloma followed by Hematology  -recheck iron studies    6  Other problems:  -acute enterocolitis with a negative C diff followed by primary service  -anaerobic gram-negative bacteremia Infectious Disease monitoring/treating:  Secondary to GI translocation  -acute metabolic encephalopathy resolved  -history of frequent UTIs:  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  -type 1 diabetes mellitus  -history multiple myeloma    Discussed with Dr Tacos Ordonez  We reviewed the plan regarding the AK I/CKD and immunosuppressives  Subjective: The patient still with ongoing diarrhea  No chest pain  No nausea vomiting  No shortness of breath  Urinating quite well without symptoms  Eating and drinking well      Objective:     Vitals: Blood pressure 153/52, pulse 64, temperature 98 3 °F (36 8 °C), resp  rate 20, height 5' 8" (1 727 m), weight 107 kg (236 lb 12 8 oz), SpO2 97 %  ,Body mass index is 36 01 kg/m²      Weight (last 2 days)     Date/Time   Weight    12/22/19 0600   107 (236 8)    12/21/19 0557   107 (236 8)                Intake/Output Summary (Last 24 hours) at 12/23/2019 1634  Last data filed at 12/22/2019 1734  Gross per 24 hour   Intake 240 ml   Output    Net 240 ml            Physical Exam: General:  No acute distress sitting out of bed  Skin:  No acute rash  Eyes:  No scleral icterus  ENT:  Moist mucous membranes  Neck:  Supple, no jugular venous distention, trachea midline  Chest:  Clear to auscultation  CVS:  No rubs or gallops appreciable, regular rate rhythm  Abdomen:  Soft and nontender with normal bowel sounds, in particular no tenderness over the kidney transplant site  Extremities:  Trace-1+ lower extremity edema 1/3 the way up the pretibial region bilaterally, no cyanosis; no arthritic changes  Neuro:  No gross focality  Psych:  Alert and oriented                Medications:    Scheduled Meds:  Current Facility-Administered Medications:  acetaminophen 650 mg Oral Q6H PRN Kourtney Ortiz PA-C    aluminum-magnesium hydroxide-simethicone 30 mL Oral Q4H PRN Kourtney Ortiz PA-C    amLODIPine 10 mg Oral Daily Armando Martines MD    ARIPiprazole 30 mg Oral HS Shalonda Bar, DO    aspirin 81 mg Oral Daily Shalonda Bar, DO    busPIRone 5 mg Oral BID Shalonda Bar, DO    cholecalciferol 3,000 Units Oral Daily Shalonda Bar, DO    cloNIDine 0 2 mg Oral Q8H [address] Dominic Ramirez MD    dicyclomine 20 mg Oral TID PRN Lincoln Mcduffie MD    doxazosin 2 mg Oral HS Dominic Ramirez MD    DULoxetine 60 mg Oral BID Shalonda Bar, DO    ferrous sulfate 325 mg Oral Daily With Breakfast Shalonda Bar, DO    folic acid 5,490 mcg Oral Daily Shalonda Bar, DO    heparin (porcine) 5,000 Units Subcutaneous [facility] Shalonda Bar, DO    hydrALAZINE 5 mg Intravenous Q6H PRN Dany Knox MD    hydrALAZINE 50 mg Oral Q8H [facility] Kacy Michaels MD    insulin lispro 1-5 Units Subcutaneous HS Sara Garnett, DO    insulin lispro 1-6 Units Subcutaneous TID AC Dany Knox MD    levothyroxine 125 mcg Oral Early Morning Sara Garnett, DO    loperamide 2 mg Oral TID PRN Jose Juan Conn MD    LORazepam 2 mg Oral TID PRN Sara Garnett, DO    metoprolol tartrate 50 mg Oral Q12H [facility] Sara Garnett, DO    ondansetron 4 mg Intravenous Q4H PRN Omer Yoon MD    pravastatin 80 mg Oral Daily Sara Garnett, DO    predniSONE 5 mg Oral Daily Sara Garnett, DO    rOPINIRole 0 25 mg Oral BID Sara Garnett, DO    saccharomyces boulardii 250 mg Oral BID Sara Garnett, DO    sertraline 200 mg Oral Daily Sara Garnett, DO    sevelamer 800 mg Oral TID With Meals Sara Tamir, DO    sodium bicarbonate infusion 75 mL/hr Intravenous Continuous Sharron Lobo MD Last Rate: 75 mL/hr (12/23/19 0215)   sodium bicarbonate 1,300 mg Oral BID after meals Ishmael Barriga MD    tacrolimus 2 mg Oral Daily Chanel Harmon, DO    tacrolimus 2 mg Oral HS Ishmael Barriga MD        PRN Meds:   acetaminophen    aluminum-magnesium hydroxide-simethicone    dicyclomine    hydrALAZINE    loperamide    LORazepam    ondansetron    Continuous Infusions:  sodium bicarbonate infusion 75 mL/hr Last Rate: 75 mL/hr (12/23/19 0215)       Lab, Imaging and other studies: I have personally reviewed pertinent labs    Laboratory Results:  Results from last 7 days   Lab Units 12/23/19  0438 12/22/19  0602 12/21/19  0918 12/19/19  0838 12/18/19  0909 12/18/19  0845 12/17/19  0855   WBC Thousand/uL 7 89  --  8 54 6 59 6 12  --   --    HEMOGLOBIN g/dL 7 4*  --  7 6* 7 7* 7 6*  --   --    HEMATOCRIT % 23 8*  --  24 7* 24 7* 24 9*  --   --    PLATELETS Thousands/uL 158  --  163 172 163  --   --    POTASSIUM mmol/L 3 3* 3 4* 3 3* 3 5  --  3 3* 3 3*   CHLORIDE mmol/L 117* 119* 119* 117*  --  117* 116*   CO2 mmol/L 20* 17* 15* 18*  --  16* 17*   BUN mg/dL 32* 30* 32* 30*  --  32* 32*   CREATININE mg/dL 2 61* 2 52* 2 70* 2 56*  --  2 55* 2 50*   CALCIUM mg/dL 8 1* 8 3 8 0* 8 1*  --  8 4 8 2*   MAGNESIUM mg/dL  --   --  2 4  --   --   --   --    PHOSPHORUS mg/dL  --   --  5 0*  --   --   --   --      Urinalysis: Lab Results   Component Value Date    COLORU Yellow 12/09/2019    COLORU Yellow 07/29/2016    CLARITYU Cloudy 12/09/2019    CLARITYU Cloudy 07/29/2016    SPECGRAV 1 013 12/09/2019    SPECGRAV 1 010 07/29/2016    PHUR 7 0 12/09/2019    PHUR 7 5 11/05/2019    PHUR 7 5 07/29/2016    LEUKOCYTESUR Large (A) 12/09/2019    LEUKOCYTESUR 3+ 07/29/2016    NITRITE Negative 12/09/2019    NITRITE NEG 07/29/2016    PROTEINUA 1+ 07/29/2016    GLUCOSEU Negative 12/09/2019    GLUCOSEU Negative 07/20/2015    KETONESU Trace (A) 12/09/2019    KETONESU NEG 07/29/2016    BILIRUBINUR Negative 12/09/2019    BILIRUBINUR NEG 07/29/2016    BLOODU Small (A) 12/09/2019    BLOODU NEG 07/29/2016     ABGs: No results found for: [facility]  Radiology review:     Portions of the record may have been created with voice recognition software   Occasional wrong word or "sound a like" substitutions may have occurred due to the inherent limitations of voice recognition software   Read the chart carefully and recognize, using context, where substitutions have occurred

## 2019-12-23 NOTE — ASSESSMENT & PLAN NOTE
GI loss, continuing to replete  Appreciate Nephrology recommendation to resume spironolactone and continue with oral bicarbonate  IV bicarbonate being discontinued due to some edema    · [facility]

## 2019-12-23 NOTE — PHYSICAL THERAPY NOTE
Physical Therapy Treatment Note       12/23/19 0914   Pain Assessment   Pain Assessment 0-10   Pain Score 2   Pain Type Acute pain   Pain Location Abdomen   Patient's Stated Pain Goal No pain   Hospital Pain Intervention(s) Ambulation/increased activity   Response to Interventions unchanged   Restrictions/Precautions   Weight Bearing Precautions Per Order No   Other Precautions Cognitive; Chair Alarm; Bed Alarm;Multiple lines; Fall Risk;Pain   General   Chart Reviewed Yes   Family/Caregiver Present No   Cognition   Overall Cognitive Status Impaired   Arousal/Participation Responsive   Attention Attends with cues to redirect   Orientation Level Oriented X4   Memory Unable to assess   Following Commands Follows one step commands without difficulty   Subjective   Subjective states she feels a bit better today  Less pain, and willing to mobilize  Notes significant fatigue during and p gait   Transfers   Sit to Stand 5  Supervision   Additional items Assist x 1   Stand to Sit 5  Supervision   Additional items Assist x 1   Ambulation/Elevation   Gait pattern   (slow, ataxia, short step length)   Gait Assistance   (CGA of 1)   Additional items Assist x 1   Assistive Device   (used IV pole for support)   Distance 50'x1, 100'x1, 60'x1 w/ standing rests x 1-2 min  repositioned in chair p session   Balance   Static Sitting Good   Dynamic Sitting Fair   Static Standing Fair -   Dynamic Standing Fair -   Ambulatory Fair -   Endurance Deficit   Endurance Deficit Yes   Endurance Deficit Description weakness, fatigue   Activity Tolerance   Activity Tolerance Patient limited by fatigue;Patient limited by pain;Treatment limited secondary to medical complications (Comment)   Nurse Made Aware yes   Assessment   Prognosis Good   Problem List Decreased strength;Decreased endurance; Impaired balance;Decreased coordination;Decreased mobility; Decreased cognition; Impaired judgement;Decreased safety awareness;Pain   Assessment Pt seen for session for setup, transfers, gait w/ rest time and repositioning  Pt cooperative, willing to mobilize  c/o pain in abdomen, as well as SOB and fatigue, during and p gait  Needs CGA/min A for gait for steadying  In speaking w/ pt, does not feel she is strong enough to d/c directly home, requests rehab at d/c   agree w/ same   Goals   Patient Goals to get stronger and go home   STG Expiration Date 01/06/20   Short Term Goal #1 1-2 wks: bed mob and transfers w/ indep, standing balance to good/normal dynamically, ambulate 200-300 ft w/ indep, increase B LE strength by 1/2 -1 grade, ambulate 3 JESSI w/S   PT Treatment Day 3   Plan   Treatment/Interventions Functional transfer training;LE strengthening/ROM; Elevations; Endurance training; Therapeutic exercise;Equipment eval/education; Bed mobility;Gait training;Patient/family training   Progress Progressing toward goals   PT Frequency   (3-5x/wk)   Recommendation   Recommendation Post acute IP rehab  (see above- recommend rehab at d/c)   PT - OK to Discharge Yes  (when stable to rehab- see above)   Arik Merrill PT, DPT CSRS

## 2019-12-23 NOTE — ASSESSMENT & PLAN NOTE
Blood pressures have remained variable  Patient currently on clonidine t i d  , hydralazine Q 8, And Cardura, metoprolol  Norvasc was discontinued, now resumed  Continue to monitor  Blood pressures are now trending upwards    Spironolactone being added

## 2019-12-23 NOTE — SOCIAL WORK
LOS update:     CM received call from Dr Cisco Cordero; pt is having worsening kidney function, bicarb wasting from diarrhea and is now on bicarb gtt  Poss GI consult  No anticipated dc for another day or two; OO and RAFA BERNARD advised  MSW SALTY Leo aware

## 2019-12-23 NOTE — ASSESSMENT & PLAN NOTE
· With history of recurrent UTIs in setting of renal and pancreatic transplant  Most recently with VRE at recent admission to [facility]  Presenting with abdominal pain/nausea and lethargy/confusion  · CT abdomen/pelvis showing colitis  · Stool cdiff neg x2, enteric panel neg, leucocytes neg  · Checked CMV PCR - negative  · Abdominal pain improved still with mucoid material   Questran was not tolerated    Will trial some loperamide with increased dose of Bentyl no antibiotics at this time

## 2019-12-23 NOTE — ASSESSMENT & PLAN NOTE
· Follows with Dr Samuel Hernandez as outpatient  · Continue Revlimid at home dosage  · Monitor hb, running low  · H/o transfusions, hemoglobin has remained stable at 7 6  Will continue hold on transfusion as she has multiple autoantibodies noted  Patient remains stable in the mid sevens    Will have her follow up shortly after discharge from hospital

## 2019-12-23 NOTE — ASSESSMENT & PLAN NOTE
Working diagnosis was a viral enterocolitis, with residual irritable bowel  Patient did not tolerate question  Patient is too far out of her transplant to be in the window for any immune mediated process  Patient did find some relief with loperamide and increased Bentyl 20 mg    Will continue

## 2019-12-23 NOTE — ASSESSMENT & PLAN NOTE
· Final cultures Grew Porphyromonas asaccharolytica    ID has been monitoring off antibiotics  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, with post infectious IBS  Patient did not tolerate questran  Discussed with ID  No additional antibiotics at this time  Discussed with GI    loperamide and increased Bentyl did improve her symptoms  Will continue this regimen  For  · Reassess labs in a

## 2019-12-23 NOTE — RESTORATIVE TECHNICIAN NOTE
Restorative Specialist Mobility Note       Activity: Ambulate in barron, Ambulate in room, Bathroom privileges, Chair, Dangle, Stand at bedside(Educated/encouraged pt to ambulate with assistance 3-4 x's/day  Chair alarm on   Pt callbell, phone/tray within reach )     Assistive Device: Front wheel walker, Other (Comment)(B/L shoes on )    Aleksandra STONE, Restorative Technician, [employer]

## 2019-12-23 NOTE — ASSESSMENT & PLAN NOTE
· With history of recurrent UTIs in setting of renal and pancreatic transplant  Most recently with VRE at recent admission to [facility]  Presenting with abdominal pain/nausea and lethargy/confusion  · CT abdomen/pelvis showing colitis  · Stool cdiff neg x2, enteric panel neg, leucocytes neg  · Checked CMV PCR - negative  · Patient with less mucoid stooling today and no cramping per her report on the new regimen of Bentyl 20 mg and loperamide  Will continue to monitor symptoms will have a protracted recovery which I have warned her about

## 2019-12-23 NOTE — ASSESSMENT & PLAN NOTE
Working diagnosis was a viral enterocolitis  Patient took Questran but cause crampy abdominal pain with more formed stool  Reviewed with GI in ID    Will try some loperamide and increased Bentyl 20 mg

## 2019-12-23 NOTE — ASSESSMENT & PLAN NOTE
· Final cultures Grew Porphyromonas asaccharolytica    ID has been monitoring off antibiotics  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, with post infectious IBS  Patient not tolerating Aragon Minder  Discussed with ID  No additional antibiotics at this time  Discussed with GI  Will trial dose of loperamide and Bentyl Continue bicarb drip nephrology  · Reassess labs in a m

## 2019-12-23 NOTE — ASSESSMENT & PLAN NOTE
Lab Results   Component Value Date    HGBA1C 5 1 09/09/2019       Recent Labs     12/21/19  2104 12/22/19  0638 12/22/19  1057 12/22/19  1612   POCGLU 114 116 129 149*       Blood Sugar Average: Last 72 hrs:  · (P) 132 6 stopped Lantus (in place of Toujeo) 5 units qHS comma will continue to monitor    · Cont SSI with accu-cheks and carb controlled diet  · Initiate hypoglycemia protocol and avoid hypoglycemia

## 2019-12-23 NOTE — ASSESSMENT & PLAN NOTE
Blood pressures have remained variable  Patient currently on clonidine t i d  And Cardura  Norvasc was discontinued  Continue to monitor  Blood pressures are now trending upwards

## 2019-12-23 NOTE — PROGRESS NOTES
Progress Note - Maryann Deleon 1965, 53 y o  female MRN: [id number]    Unit/Bed#: -01 Encounter: [id number]    Primary Care Provider: Jailene Elaine MD   Date and time admitted to hospital: 12/9/2019  8:29 PM        Bacteremia  Assessment & Plan  · Final cultures Grew Porphyromonas asaccharolytica    ID has been monitoring off antibiotics  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, with post infectious IBS  Patient not tolerating Mitchell Rancher  Discussed with ID  No additional antibiotics at this time  Discussed with GI  Will trial dose of loperamide and Bentyl Continue bicarb drip nephrology  · Reassess labs in a m  Acute metabolic encephalopathy  Assessment & Plan  Patient back to baseline  Neuropsychiatric evaluation shows she has capacity for decision making  Remains cognitively intact  Diarrhea  Assessment & Plan  Working diagnosis was a viral enterocolitis  Patient took Questran but cause crampy abdominal pain with more formed stool  Reviewed with GI in ID  Will try some loperamide and increased Bentyl 20 mg    Hypokalemia  Assessment & Plan  GI loss, continuing to replete  Started on bicarbonate drip today due to bicarb losses  Serial labs in replete    * Enterocolitis  Assessment & Plan  · With history of recurrent UTIs in setting of renal and pancreatic transplant  Most recently with VRE at recent admission to [facility]  Presenting with abdominal pain/nausea and lethargy/confusion  · CT abdomen/pelvis showing colitis  · Stool cdiff neg x2, enteric panel neg, leucocytes neg  · Checked CMV PCR - negative  · Abdominal pain improved still with mucoid material   Questran was not tolerated    Will trial some loperamide with increased dose of Bentyl no antibiotics at this time    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 [facility])  Assessment & Plan  Lab Results   Component Value Date    HGBA1C 5 1 09/09/2019       Recent Labs     12/21/19  2104 12/22/19  0638 12/22/19  1057 12/22/19  1612   POCGLU 114 116 129 149*       Blood Sugar Average: Last 72 hrs:  · (P) 132 6 stopped Lantus (in place of Toujeo) 5 units qHS comma will continue to monitor  · Cont SSI with accu-cheks and carb controlled diet  · Initiate hypoglycemia protocol and avoid hypoglycemia    Essential hypertension  Assessment & Plan  Blood pressures have remained variable  Patient currently on clonidine t i d  And Cardura  Norvasc was discontinued  Continue to monitor  Blood pressures are now trending upwards  Multiple myeloma not having achieved remission [facility])  Assessment & Plan  · Follows with Dr Rashawn Gomez as outpatient  · Continue Revlimid at home dosage  · Monitor hb, running low  · H/o transfusions, hemoglobin has remained stable at 7 6  Will continue hold on transfusion as she has multiple autoantibodies noted  Patient remains stable in the mid sevens  Will have her follow up shortly after discharge from hospital     Chronic kidney disease, stage 4 (severe) ([facility])  Assessment & Plan  · Creatinine has been relatively stable  Continue to monitor with serial laboratories  · Monitor volume status, not all UO documented  · Monitor BMP while inpatient  · Avoid nephrotoxins as able, renally dose medications as indicated  · Reviewed with Nephrology  Patient with bicarb losses due to the increased bowel movements bicarbonate drip hanging  Continue to monitor laboratories in the a m  Renal transplant recipient  Assessment & Plan  · On chronic immunosuppression with tacrolimus and prednisone  · Unfortunately patient noncompliant with all medications since discharge including these  · Tacrolimus level was 4 > 10 > 7 (12/12) > 5 (12/14) remains stable at 4 4  · dose decreased to 2 BID on 12/12, again decrease to 2 mg QAM & 1 5 mg HS from 12/14  · Tacrolimus level was stable on the current dosing  Nephro requested reorder    · Last level 4 5    Metabolic acidosis  Assessment & Plan  · Appears acute on chronic  · S/P kidney and pancreatic transplant in   · continue p o  and trend BMP  · Nephrology note appreciated  Patient with an RTA related to her pancreatic transplant  Low-dose bicarb drip restarted  Monitor laboratories in a m  Asymptomatic bacteriuria  Assessment & Plan  · Recently completed 7 D course of IV daptomycin for VRE UTI  Monitoring off antibiotic therapy  · Has positive cultures for VRE again, but this is not the cause of her symptoms, its colonization  · Monitor off Abx          VTE Pharmacologic Prophylaxis:   Pharmacologic: Heparin  Mechanical: Mechanical VTE prophylaxis in place  Patient Centered Rounds: I have performed bedside rounds with nursing staff today  Discussions with Specialists or Other Care Team Provider: GI, ID, Nephro  Education and Discussions with Family / Patient: updated father at bedside  Time Spent for Care: 30 minutes  If More than 50% of total time spent on counseling and coordination of care as described above indicate yes or no and described the counseling and coordination:no    Current Length of Stay: 12 day(s)  Current Patient Status: Inpatient   Certification Statement: The patient will continue to require additional inpatient hospital stay due to still with diarrhea will check into H disease presentations  Discharge Plan: to be determined  Code Status: Level 1 - Full Code    Subjective:   Patient remains with crampy abdomen pain  Small but frequent mucoid watery stool    Objective:   Vitals:   Temp (24hrs), Av 9 °F (36 6 °C), Min:97 4 °F (36 3 °C), Max:98 3 °F (36 8 °C)    Temp:  [97 4 °F (36 3 °C)-98 3 °F (36 8 °C)] 97 4 °F (36 3 °C)  HR:  [64] 64  Resp:  [18-20] 20  BP: (152-162)/(56-64) 162/56  SpO2:  [97 %] 97 %  Body mass index is 36 01 kg/m²  Input and Output Summary (last 24 hours):        Intake/Output Summary (Last 24 hours) at 2019  Last data filed at 2019 1734  Gross per 24 hour Intake 720 ml   Output 950 ml   Net -230 ml       Physical Exam:  Generally obese female no acute distress at present normocephalic atraumatic pupils equal round reactive to light extraocular muscles intact mucous membra     Chest is clear to auscultation is no rhonchi rales or wheezes  Cardiovascular regular rate rhythm positive S1-S2 no S3-S4 murmur or gallop  Abdomen mildly distended no guarding rebound but patient feels diffusely crampy  Extremities no clubbing cyanosis edema  Neurologically patient awake alert oriented cranial nerves 2-12 intact      Physical Exam    Additional Data:   Labs:  Results from last 7 days   Lab Units 12/21/19  0918   WBC Thousand/uL 8 54   HEMOGLOBIN g/dL 7 6*   HEMATOCRIT % 24 7*   PLATELETS Thousands/uL 163   NEUTROS PCT % 71   LYMPHS PCT % 14   MONOS PCT % 9   EOS PCT % 4     Results from last 7 days   Lab Units 12/22/19  0602 12/21/19  0918   POTASSIUM mmol/L 3 4* 3 3*   CHLORIDE mmol/L 119* 119*   CO2 mmol/L 17* 15*   BUN mg/dL 30* 32*   CREATININE mg/dL 2 52* 2 70*   CALCIUM mg/dL 8 3 8 0*   ALK PHOS U/L  --  95   ALT U/L  --  15   AST U/L  --  13           * I Have Reviewed All Lab Data Listed Above  * Additional Pertinent Lab Tests Reviewed: All Labs Within Last 24 Hours Reviewed    Imaging:    Imaging Reports Reviewed Today Include:  None    Cultures:   Blood Culture:   Lab Results   Component Value Date    BLOODCX No Growth After 5 Days  12/14/2019    BLOODCX Porphyromonas asaccharolytica (A) 12/09/2019    BLOODCX Porphyromonas asaccharolytica (A) 12/09/2019    BLOODCX No Growth After 5 Days  10/01/2019    BLOODCX No Growth After 5 Days  10/01/2019    BLOODCX No Growth After 5 Days  11/16/2017    BLOODCX No Growth After 5 Days   09/13/2017     Urine Culture:   Lab Results   Component Value Date    URINECX (A) 12/09/2019     >100,000 cfu/ml Vancomycin Resistant Enterococcus faecium    URINECX (A) 12/09/2019     6878-0510 cfu/ml Gram-negative cintia- species URINECX (A) 11/24/2019     >100,000 cfu/ml Vancomycin Resistant Enterococcus faecium    URINECX No Growth <1000 cfu/mL 11/05/2019    URINECX <10,000 cfu/ml  11/04/2019    URINECX >100,000 cfu/ml Escherichia coli (A) 10/23/2019    URINECX 10,000-19,000 cfu/ml  10/23/2019     Sputum Culture: No components found for: SPUTUMCX  Wound Culture: No results found for: WOUNDCULT    Last 24 Hours Medication List:     Current Facility-Administered Medications:  acetaminophen 650 mg Oral Q6H PRN Koutrney Ortiz PA-C    aluminum-magnesium hydroxide-simethicone 30 mL Oral Q4H PRN Kourtney Ortiz PA-C    amLODIPine 10 mg Oral Daily Anali Murphy MD    ARIPiprazole 30 mg Oral HS March Gubler, DO    aspirin 81 mg Oral Daily March Gubler, DO    busPIRone 5 mg Oral BID March Gubler, DO    cholecalciferol 3,000 Units Oral Daily March Gubler, DO    cloNIDine 0 2 mg Oral Q8H [address] Bethann Hammans, MD    dicyclomine 20 mg Oral TID PRN Britni Sung MD    doxazosin 2 mg Oral HS Bethann Hammans, MD    DULoxetine 60 mg Oral BID March Gubler, DO    ferrous sulfate 325 mg Oral Daily With Breakfast March Gubler, DO    folic acid 6,159 mcg Oral Daily March Gubler, DO    heparin (porcine) 5,000 Units Subcutaneous Q8H [address] March Gubler, DO    hydrALAZINE 5 mg Intravenous Q6H PRN Anali Murphy MD    hydrALAZINE 50 mg Oral Q8H [address] Bethann Hammans, MD    insulin lispro 1-5 Units Subcutaneous HS March Gubler, DO    insulin lispro 1-6 Units Subcutaneous TID AC Anali Murphy MD    levothyroxine 125 mcg Oral Early Morning March Gubler, DO    loperamide 2 mg Oral TID PRN Birtni Sung MD    LORazepam 2 mg Oral TID PRN March Gubler, DO    metoprolol tartrate 50 mg Oral Q12H [address] March Gubler, DO    ondansetron 4 mg Intravenous Q4H PRN Jordan Garcias MD    pravastatin 80 mg Oral Daily Sivan Carreno DO    predniSONE 5 mg Oral Daily Sivan Carreno DO    rOPINIRole 0 25 mg Oral BID Sivan Carreno DO saccharomyces boulardii 250 mg Oral BID Shalonda Bar, DO    sertraline 200 mg Oral Daily Shalonda Bar, DO    sevelamer 800 mg Oral TID With Meals Shalonda Bar, DO    sodium bicarbonate infusion 75 mL/hr Intravenous Continuous Aylin Alexander MD Last Rate: 75 mL/hr (12/22/19 1014)   sodium bicarbonate 1,300 mg Oral BID after meals Mark Jain MD    tacrolimus 2 mg Oral Daily Seamus Fu, DO    tacrolimus 2 mg Oral HS Mark Jain MD         Today, Patient Was Seen By: Lincoln Mcduffie MD    ** Please Note: Dragon 360 Dictation voice to text software may have been used in the creation of this document   **

## 2019-12-23 NOTE — ASSESSMENT & PLAN NOTE
Lab Results   Component Value Date    HGBA1C 5 1 09/09/2019       Recent Labs     12/22/19  2113 12/23/19  0623 12/23/19  1101 12/23/19  1616   POCGLU 126 125 109 143*       Blood Sugar Average: Last 72 hrs:  · (P) 485 [id number] stopped Lantus (in place of Toujeo) 5 units qHS due to hypoglycemia  · Cont SSI with accu-cheks and carb controlled diet  · Initiate hypoglycemia protocol and avoid hypoglycemia

## 2019-12-23 NOTE — ASSESSMENT & PLAN NOTE
GI loss, continuing to replete  Started on bicarbonate drip today due to bicarb losses    Serial labs in replete

## 2019-12-24 ENCOUNTER — APPOINTMENT (INPATIENT)
Dept: NON INVASIVE DIAGNOSTICS | Facility: HOSPITAL | Age: 54
DRG: 391 | End: 2019-12-24
Payer: MEDICARE

## 2019-12-24 PROBLEM — R82.71 ASYMPTOMATIC BACTERIURIA: Chronic | Status: ACTIVE | Noted: 2019-12-11

## 2019-12-24 PROBLEM — M79.89 SWELLING OF LOWER EXTREMITY: Status: ACTIVE | Noted: 2019-12-24

## 2019-12-24 LAB
ANION GAP SERPL CALCULATED.3IONS-SCNC: 7 MMOL/L (ref 4–13)
BUN SERPL-MCNC: 32 MG/DL (ref 5–25)
CALCIUM SERPL-MCNC: 8.1 MG/DL (ref 8.3–10.1)
CHLORIDE SERPL-SCNC: 115 MMOL/L (ref 100–108)
CO2 SERPL-SCNC: 18 MMOL/L (ref 21–32)
CREAT SERPL-MCNC: 2.76 MG/DL (ref 0.6–1.3)
GFR SERPL CREATININE-BSD FRML MDRD: 19 ML/MIN/1.73SQ M
GLUCOSE SERPL-MCNC: 101 MG/DL (ref 65–140)
GLUCOSE SERPL-MCNC: 102 MG/DL (ref 65–140)
GLUCOSE SERPL-MCNC: 117 MG/DL (ref 65–140)
GLUCOSE SERPL-MCNC: 146 MG/DL (ref 65–140)
GLUCOSE SERPL-MCNC: 86 MG/DL (ref 65–140)
MAGNESIUM SERPL-MCNC: 2.7 MG/DL (ref 1.6–2.6)
PHOSPHATE SERPL-MCNC: 4.4 MG/DL (ref 2.7–4.5)
POTASSIUM SERPL-SCNC: 4 MMOL/L (ref 3.5–5.3)
SODIUM SERPL-SCNC: 140 MMOL/L (ref 136–145)

## 2019-12-24 PROCEDURE — 99232 SBSQ HOSP IP/OBS MODERATE 35: CPT | Performed by: INTERNAL MEDICINE

## 2019-12-24 PROCEDURE — 82948 REAGENT STRIP/BLOOD GLUCOSE: CPT

## 2019-12-24 PROCEDURE — 93970 EXTREMITY STUDY: CPT

## 2019-12-24 PROCEDURE — 83735 ASSAY OF MAGNESIUM: CPT | Performed by: INTERNAL MEDICINE

## 2019-12-24 PROCEDURE — 80048 BASIC METABOLIC PNL TOTAL CA: CPT | Performed by: INTERNAL MEDICINE

## 2019-12-24 PROCEDURE — 84100 ASSAY OF PHOSPHORUS: CPT | Performed by: INTERNAL MEDICINE

## 2019-12-24 RX ORDER — TORSEMIDE 20 MG/1
20 TABLET ORAL ONCE
Status: COMPLETED | OUTPATIENT
Start: 2019-12-24 | End: 2019-12-24

## 2019-12-24 RX ADMIN — DICYCLOMINE HYDROCHLORIDE 20 MG: 10 CAPSULE ORAL at 21:38

## 2019-12-24 RX ADMIN — SODIUM BICARBONATE 650 MG TABLET 1300 MG: at 09:50

## 2019-12-24 RX ADMIN — HYDRALAZINE HYDROCHLORIDE 50 MG: 50 TABLET, FILM COATED ORAL at 12:29

## 2019-12-24 RX ADMIN — ROPINIROLE 0.25 MG: 0.25 TABLET, FILM COATED ORAL at 16:52

## 2019-12-24 RX ADMIN — SERTRALINE HYDROCHLORIDE 200 MG: 100 TABLET ORAL at 09:49

## 2019-12-24 RX ADMIN — LEVOTHYROXINE SODIUM 125 MCG: 125 TABLET ORAL at 05:20

## 2019-12-24 RX ADMIN — BUSPIRONE HYDROCHLORIDE 5 MG: 5 TABLET ORAL at 09:52

## 2019-12-24 RX ADMIN — DICYCLOMINE HYDROCHLORIDE 20 MG: 10 CAPSULE ORAL at 10:07

## 2019-12-24 RX ADMIN — MELATONIN 3000 UNITS: at 09:51

## 2019-12-24 RX ADMIN — TACROLIMUS 2 MG: 1 CAPSULE ORAL at 09:53

## 2019-12-24 RX ADMIN — AMLODIPINE BESYLATE 10 MG: 10 TABLET ORAL at 10:07

## 2019-12-24 RX ADMIN — PREDNISONE 5 MG: 5 TABLET ORAL at 09:52

## 2019-12-24 RX ADMIN — PRAVASTATIN SODIUM 80 MG: 80 TABLET ORAL at 09:50

## 2019-12-24 RX ADMIN — FOLIC ACID 1000 MCG: 1 TABLET ORAL at 09:51

## 2019-12-24 RX ADMIN — TORSEMIDE 20 MG: 20 TABLET ORAL at 12:26

## 2019-12-24 RX ADMIN — Medication 250 MG: at 16:52

## 2019-12-24 RX ADMIN — BUSPIRONE HYDROCHLORIDE 5 MG: 5 TABLET ORAL at 16:51

## 2019-12-24 RX ADMIN — SODIUM BICARBONATE 650 MG TABLET 1300 MG: at 16:51

## 2019-12-24 RX ADMIN — FERROUS SULFATE TAB 325 MG (65 MG ELEMENTAL FE) 325 MG: 325 (65 FE) TAB at 09:52

## 2019-12-24 RX ADMIN — METOPROLOL TARTRATE 50 MG: 50 TABLET, FILM COATED ORAL at 21:36

## 2019-12-24 RX ADMIN — SPIRONOLACTONE 25 MG: 25 TABLET ORAL at 10:07

## 2019-12-24 RX ADMIN — DULOXETINE HYDROCHLORIDE 60 MG: 60 CAPSULE, DELAYED RELEASE ORAL at 16:52

## 2019-12-24 RX ADMIN — SEVELAMER HYDROCHLORIDE 800 MG: 800 TABLET, FILM COATED PARENTERAL at 12:25

## 2019-12-24 RX ADMIN — HYDRALAZINE HYDROCHLORIDE 50 MG: 50 TABLET, FILM COATED ORAL at 21:37

## 2019-12-24 RX ADMIN — HYDRALAZINE HYDROCHLORIDE 50 MG: 50 TABLET, FILM COATED ORAL at 05:20

## 2019-12-24 RX ADMIN — CLONIDINE HYDROCHLORIDE 0.2 MG: 0.1 TABLET ORAL at 12:28

## 2019-12-24 RX ADMIN — ROPINIROLE 0.25 MG: 0.25 TABLET, FILM COATED ORAL at 09:50

## 2019-12-24 RX ADMIN — HEPARIN SODIUM 5000 UNITS: 5000 INJECTION INTRAVENOUS; SUBCUTANEOUS at 12:30

## 2019-12-24 RX ADMIN — LOPERAMIDE HYDROCHLORIDE 2 MG: 2 CAPSULE ORAL at 21:38

## 2019-12-24 RX ADMIN — CLONIDINE HYDROCHLORIDE 0.2 MG: 0.1 TABLET ORAL at 21:37

## 2019-12-24 RX ADMIN — HEPARIN SODIUM 5000 UNITS: 5000 INJECTION INTRAVENOUS; SUBCUTANEOUS at 05:15

## 2019-12-24 RX ADMIN — SEVELAMER HYDROCHLORIDE 800 MG: 800 TABLET, FILM COATED PARENTERAL at 09:50

## 2019-12-24 RX ADMIN — SEVELAMER HYDROCHLORIDE 800 MG: 800 TABLET, FILM COATED PARENTERAL at 16:51

## 2019-12-24 RX ADMIN — DOXAZOSIN 2 MG: 2 TABLET ORAL at 21:36

## 2019-12-24 RX ADMIN — ASPIRIN 81 MG 81 MG: 81 TABLET ORAL at 09:52

## 2019-12-24 RX ADMIN — Medication 250 MG: at 09:51

## 2019-12-24 RX ADMIN — DULOXETINE HYDROCHLORIDE 60 MG: 60 CAPSULE, DELAYED RELEASE ORAL at 09:50

## 2019-12-24 RX ADMIN — CLONIDINE HYDROCHLORIDE 0.2 MG: 0.1 TABLET ORAL at 05:20

## 2019-12-24 RX ADMIN — TACROLIMUS 2 MG: 1 CAPSULE ORAL at 21:39

## 2019-12-24 RX ADMIN — ARIPIPRAZOLE 30 MG: 10 TABLET ORAL at 21:36

## 2019-12-24 RX ADMIN — HEPARIN SODIUM 5000 UNITS: 5000 INJECTION INTRAVENOUS; SUBCUTANEOUS at 21:36

## 2019-12-24 RX ADMIN — METOPROLOL TARTRATE 50 MG: 50 TABLET, FILM COATED ORAL at 09:52

## 2019-12-24 NOTE — ASSESSMENT & PLAN NOTE
· Appears acute on chronic  · S/P kidney and pancreatic transplant in 1998  · continue p o  and trend BMP  · Patient with an RTA related to her pancreatic transplant per Nephrology  Stopping bicarb drip due to edema  Continue po, close as per Nephrology  Appreciate Nephrology input

## 2019-12-24 NOTE — ASSESSMENT & PLAN NOTE
Working diagnosis was a viral enterocolitis, with residual irritable bowel  Diarrhea is improving  No bowel movement since 12/23 evening  Patient is too far out of her transplant to be in the window for any immune mediated process  Patient did find some relief with loperamide and increased Bentyl 20 mg    Will continue

## 2019-12-24 NOTE — RESTORATIVE TECHNICIAN NOTE
Restorative Specialist Mobility Note       Activity: Ambulate in barron, Ambulate in room, Bathroom privileges, Chair, Dangle, Stand at bedside(Educated/encouraged pt to ambulate with assistance 3-4 x's/day  Chair alarm on   Pt callbell, phone/tray within reach )     Assistive Device: Front wheel walker             [employer] BS, Restorative Technician, [employer]

## 2019-12-24 NOTE — ASSESSMENT & PLAN NOTE
· Follows with Dr Lizette Calderon as outpatient  · Continue Revlimid at home dosage  · Stable between 7-8  · Known history of transfusions in past   Has multiple autoantibodies

## 2019-12-24 NOTE — PROGRESS NOTES
NEPHROLOGY PROGRESS NOTE   Johanna Mattson 53 y o  female MRN: [id number]  Unit/Bed#: -01 Encounter: [id number]      ASSESSMENT/PLAN:  Chronic kidney disease, stage IV:  Status post kidney and pancreas transplant   - upon review of medical records, baseline creatinine in the mid 2s  - patient follows in our office as an outpatient  She will need follow-up upon discharge  - continue current immunosuppressive medications  Acute kidney injury:  Suspect prerenal secondary to volume depletion   - peak creatinine:  3 39   - creatinine slightly increased to 2 76 from 2 61 yesterday  - spironolactone started this am   Monitor renal indices closely  - avoid hypotension/fluctuations in blood pressure  - avoid NSAIDs, nephrotoxic agents, IV contrast   - monitor volume status closely with intake/output  Daily weights   - repeat BMP in a m  Hypertension:  Blood pressure remains mildly elevated  - spironolactone started this am     - avoid hypotension/fluctuations in blood pressure to prevent decreased renal perfusion   - maintain hold parameters on antihypertensives  - if blood pressure remains elevated, will plan to adjust medication regimen  Electrolytes:  - Potassium improved to 4 0 today post replacement  - bicarbonate remains low at 18  Currently on oral sodium bicarbonate  Monitor with initiation of diuretic  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:  - most recent phosphorus 4 4 today  - maintain low phosphorus diet for now  - magnesium slightly elevated at 2 7 today  Anemia of chronic kidney disease:  - most recent hemoglobin 7 4 on 12/23   - iron studies ordered  - patient with history of multiple myeloma and follows with Hematology  Diarrhea:  - patient without diarrhea yesterday evening and today  - monitor volume status closely  - management per primary team      SUBJECTIVE:  Patient resting comfortably in the chair    Patient states that her legs are more swollen today  Patient denies shortness of breath, chest pain, nausea, vomiting, diarrhea      OBJECTIVE:  Current Weight: Weight - Scale: 108 kg (237 lb 10 5 oz)  Vitals:    12/24/19 0741   BP: 168/64   Pulse:    Resp: 18   Temp: 98 7 °F (37 1 °C)   SpO2:        Intake/Output Summary (Last 24 hours) at 12/24/2019 0849  Last data filed at 12/24/2019 0500  Gross per 24 hour   Intake 650 ml   Output 950 ml   Net -300 ml       General:  No acute distress sitting in chair  Skin:  Warm, no rash  Eyes:  Sclerae anicteric  ENT:  Moist lips and mucous membranes  Neck:  Supple, no JVD, trachea midline  Chest:  Clear breath sounds bilaterally   CVS:  Normal rate and rhythm, no rubs or gallops  Abdomen:  Soft, normoactive bowel sounds, nontender  Extremities:  +1 bilateral lower extremity edema, no cyanosis   Neuro:  Alert and oriented  Psych:  Appropriate affect      Medications:  Scheduled Meds:  Current Facility-Administered Medications:  acetaminophen 650 mg Oral Q6H PRN Kourtney Ortiz PA-C   aluminum-magnesium hydroxide-simethicone 30 mL Oral Q4H PRN Kourtney Ortiz PA-C   amLODIPine 10 mg Oral Daily Birdie Monae MD   ARIPiprazole 30 mg Oral HS Laith Marshall DO   aspirin 81 mg Oral Daily Laith Marshall DO   busPIRone 5 mg Oral BID Laith Marshall, DO   cholecalciferol 3,000 Units Oral Daily Laith Marshall DO   cloNIDine 0 2 mg Oral [facility] Birdie Monae MD   dicyclomine 20 mg Oral TID PRN Beth Bazzi MD   doxazosin 2 mg Oral HS Cachorro Donald MD   DULoxetine 60 mg Oral BID Laith Marshall DO   ferrous sulfate 325 mg Oral Daily With Breakfast Laith Marshall DO   folic acid 1,757 mcg Oral Daily Laith Marshall DO   heparin (porcine) 5,000 Units Subcutaneous Q8H [address] Laith Marshall DO   hydrALAZINE 5 mg Intravenous Q6H PRN Reji Long MD   hydrALAZINE 50 mg Oral Q8H [address] Cachorro Donald MD   insulin lispro 1-5 Units Subcutaneous HS Laith Marshall DO   insulin lispro 1-6 Units Subcutaneous TID  Melissa BERNARD Cameron Sharp MD   levothyroxine 125 mcg Oral Early Morning Carloz Whitfield, DO   loperamide 2 mg Oral TID PRN Guillermo Olvera MD   LORazepam 2 mg Oral TID PRN Carloz Whitfiled, DO   metoprolol tartrate 50 mg Oral Q12H [facility] Carloz Whitfield, DO   ondansetron 4 mg Intravenous Q4H PRN Selwyn Lopez MD   pravastatin 80 mg Oral Daily Carloz Whitfield, DO   predniSONE 5 mg Oral Daily Carloz Whitfield, DO   rOPINIRole 0 25 mg Oral BID Carloz Whitfield, DO   saccharomyces boulardii 250 mg Oral BID Carloz Whitfield, DO   sertraline 200 mg Oral Daily Carloz Whitfield, DO   sevelamer 800 mg Oral TID With Meals Carloz Whitfield, DO   sodium bicarbonate 1,300 mg Oral BID after meals Reema Acevedo MD   spironolactone 25 mg Oral Daily Comfort Boston MD   tacrolimus 2 mg Oral Daily [facility], DO   tacrolimus 2 mg Oral HS Reema Acevedo MD       PRN Meds:   acetaminophen    aluminum-magnesium hydroxide-simethicone    dicyclomine    hydrALAZINE    loperamide    LORazepam    ondansetron    Continuous Infusions:     Laboratory Results:  Results from last 7 days   Lab Units 12/24/19  0518 12/23/19  0438 12/22/19  0602 12/21/19  8458 12/19/19  0838 12/18/19  0909 12/18/19  0845 12/17/19  0855   WBC Thousand/uL  --  7 89  --  8 54 6 59 6 12  --   --    HEMOGLOBIN g/dL  --  7 4*  --  7 6* 7 7* 7 6*  --   --    HEMATOCRIT %  --  23 8*  --  24 7* 24 7* 24 9*  --   --    PLATELETS Thousands/uL  --  158  --  163 172 163  --   --    SODIUM mmol/L 140 142 141 142 142  --  142 142   POTASSIUM mmol/L 4 0 3 3* 3 4* 3 3* 3 5  --  3 3* 3 3*   CHLORIDE mmol/L 115* 117* 119* 119* 117*  --  117* 116*   CO2 mmol/L 18* 20* 17* 15* 18*  --  16* 17*   BUN mg/dL 32* 32* 30* 32* 30*  --  32* 32*   CREATININE mg/dL 2 76* 2 61* 2 52* 2 70* 2 56*  --  2 55* 2 50*   CALCIUM mg/dL 8 1* 8 1* 8 3 8 0* 8 1*  --  8 4 8 2*   MAGNESIUM mg/dL 2 7*  --   --  2 4  --   --   --   --    PHOSPHORUS mg/dL 4 4  --   --  5 0*  --   --   --   --

## 2019-12-24 NOTE — ASSESSMENT & PLAN NOTE
Nontender lower extremities  One dose of torsemide today  Venous Doppler ordered by Nephrology  Appreciate Nephrology input

## 2019-12-24 NOTE — ASSESSMENT & PLAN NOTE
Blood pressures have remained variable  Patient currently on clonidine t i d  , hydralazine Q 8, And Cardura, metoprolol  Norvasc was discontinued, now resumed  Continue to monitor     Added spironolactone  One dose of torsemide today

## 2019-12-24 NOTE — ASSESSMENT & PLAN NOTE
· Appears acute on chronic  · S/P kidney and pancreatic transplant in 1998  · continue p o  and trend BMP  · Discussed with nephrology  Patient with an RTA related to her pancreatic transplant  Stopping bicarb drip due to edema  Continue po

## 2019-12-24 NOTE — ASSESSMENT & PLAN NOTE
· Baseline creatinine 2 5-3  · Minimally elevated from yesterday 2 76 today  · Monitor volume status, not all UO documented  · Monitor BMP while inpatient  · One dose of torsemide today per Nephrology  · Avoid nephrotoxins as able, renally dose medications as indicated  · Appreciate Nephrology input  Now on p o  Bicarb and off bicarb infusion  · Spironolactone added due to hypokalemia

## 2019-12-24 NOTE — ASSESSMENT & PLAN NOTE
· On chronic immunosuppression with tacrolimus and prednisone  · Unfortunately patient noncompliant with all medications since discharge including these  · Tacrolimus level was 4 > 10 > 7 (12/12) > 5 (12/14) remains stable at 4 4  · dose decreased to 2 BID on 12/12, again decrease to 2 mg QAM & 1 5 mg HS from 12/14  · Tacrolimus level was stable on the current dosing      · Last level 5 4

## 2019-12-24 NOTE — PLAN OF CARE
Problem: Potential for Falls  Goal: Patient will remain free of falls  Description  INTERVENTIONS:  - Assess patient frequently for physical needs  -  Identify cognitive and physical deficits and behaviors that affect risk of falls  -  [place] fall precautions as indicated by assessment   - Educate patient/family on patient safety including physical limitations  - Instruct patient to call for assistance with activity based on assessment  - Modify environment to reduce risk of injury  - Consider OT/PT consult to assist with strengthening/mobility  Outcome: Progressing     Problem: Prexisting or High Potential for Compromised Skin Integrity  Goal: Skin integrity is maintained or improved  Description  INTERVENTIONS:  - Identify patients at risk for skin breakdown  - Assess and monitor skin integrity  - Assess and monitor nutrition and hydration status  - Monitor labs   - Assess for incontinence   - Turn and reposition patient  - Assist with mobility/ambulation  - Relieve pressure over bony prominences  - Avoid friction and shearing  - Provide appropriate hygiene as needed including keeping skin clean and dry  - Evaluate need for skin moisturizer/barrier cream  - Collaborate with interdisciplinary team   - Patient/family teaching  - Consider wound care consult   Outcome: Progressing     Problem: Nutrition/Hydration-ADULT  Goal: Nutrient/Hydration intake appropriate for improving, restoring or maintaining nutritional needs  Description  Monitor and assess patient's nutrition/hydration status for malnutrition  Collaborate with interdisciplinary team and initiate plan and interventions as ordered  Monitor patient's weight and dietary intake as ordered or per policy  Utilize nutrition screening tool and intervene as necessary  Determine patient's food preferences and provide high-protein, high-caloric foods as appropriate       INTERVENTIONS:  - Monitor oral intake, urinary output, labs, and treatment plans  - Assess nutrition and hydration status and recommend course of action  - Evaluate amount of meals eaten  - Assist patient with eating if necessary   - Allow adequate time for meals  - Recommend/ encourage appropriate diets, oral nutritional supplements, and vitamin/mineral supplements  - Order, calculate, and assess calorie counts as needed  - Recommend, monitor, and adjust tube feedings and TPN/PPN based on assessed needs  - Assess need for intravenous fluids  - Provide specific nutrition/hydration education as appropriate  - Include patient/family/caregiver in decisions related to nutrition  Outcome: Progressing     Problem: PAIN - ADULT  Goal: Verbalizes/displays adequate comfort level or baseline comfort level  Description  Interventions:  - Encourage patient to monitor pain and request assistance  - Assess pain using appropriate pain scale  - Administer analgesics based on type and severity of pain and evaluate response  - Implement non-pharmacological measures as appropriate and evaluate response  - Consider cultural and social influences on pain and pain management  - Notify physician/advanced practitioner if interventions unsuccessful or patient reports new pain  Outcome: Progressing     Problem: INFECTION - ADULT  Goal: Absence or prevention of progression during hospitalization  Description  INTERVENTIONS:  - Assess and monitor for signs and symptoms of infection  - Monitor lab/diagnostic results  - Monitor all insertion sites, i e  indwelling lines, tubes, and drains  - Monitor endotracheal if appropriate and nasal secretions for changes in amount and color  - [place] appropriate cooling/warming therapies per order  - Administer medications as ordered  - Instruct and encourage patient and family to use good hand hygiene technique  - Identify and instruct in appropriate isolation precautions for identified infection/condition  Outcome: Progressing  Goal: Absence of fever/infection during neutropenic period  Description  INTERVENTIONS:  - Monitor WBC    Outcome: Progressing     Problem: SAFETY ADULT  Goal: Maintain or return to baseline ADL function  Description  INTERVENTIONS:  -  Assess patient's ability to carry out ADLs; assess patient's baseline for ADL function and identify physical deficits which impact ability to perform ADLs (bathing, care of mouth/teeth, toileting, grooming, dressing, etc )  - Assess/evaluate cause of self-care deficits   - Assess range of motion  - Assess patient's mobility; develop plan if impaired  - Assess patient's need for assistive devices and provide as appropriate  - Encourage maximum independence but intervene and supervise when necessary  - Involve family in performance of ADLs  - Assess for home care needs following discharge   - Consider OT consult to assist with ADL evaluation and planning for discharge  - Provide patient education as appropriate  Outcome: Progressing  Goal: Maintain or return mobility status to optimal level  Description  INTERVENTIONS:  - Assess patient's baseline mobility status (ambulation, transfers, stairs, etc )    - Identify cognitive and physical deficits and behaviors that affect mobility  - Identify mobility aids required to assist with transfers and/or ambulation (gait belt, sit-to-stand, lift, walker, cane, etc )  - [place] fall precautions as indicated by assessment  - Record patient progress and toleration of activity level on Mobility SBAR; progress patient to next Phase/Stage  - Instruct patient to call for assistance with activity based on assessment  - Consider rehabilitation consult to assist with strengthening/weightbearing, etc   Outcome: Progressing     Problem: DISCHARGE PLANNING  Goal: Discharge to home or other facility with appropriate resources  Description  INTERVENTIONS:  - Identify barriers to discharge w/patient and caregiver  - Arrange for needed discharge resources and transportation as appropriate  - Identify discharge learning needs (meds, wound care, etc )  - Arrange for interpretive services to assist at discharge as needed  - Refer to Case Management Department for coordinating discharge planning if the patient needs post-hospital services based on physician/advanced practitioner order or complex needs related to functional status, cognitive ability, or social support system  Outcome: Progressing     Problem: Knowledge Deficit  Goal: Patient/family/caregiver demonstrates understanding of disease process, treatment plan, medications, and discharge instructions  Description  Complete learning assessment and assess knowledge base    Interventions:  - Provide teaching at level of understanding  - Provide teaching via preferred learning methods  Outcome: Progressing

## 2019-12-24 NOTE — PROGRESS NOTES
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, with post infectious IBS  Patient did not tolerate questran  Discussed with ID  No additional antibiotics at this time  Discussed with GI    loperamide and increased Bentyl did improve her symptoms  Will continue this regimen  For  · Reassess labs in a m  Acute metabolic encephalopathy  Assessment & Plan  Patient back to baseline  Neuropsychiatric evaluation shows she has capacity for decision making  Remains cognitively intact  Diarrhea  Assessment & Plan  Working diagnosis was a viral enterocolitis, with residual irritable bowel  Patient did not tolerate question  Patient is too far out of her transplant to be in the window for any immune mediated process  Patient did find some relief with loperamide and increased Bentyl 20 mg  Will continue    Hypokalemia  Assessment & Plan  GI loss, continuing to replete  Appreciate Nephrology recommendation to resume spironolactone and continue with oral bicarbonate  IV bicarbonate being discontinued due to some edema  · Serial laboratories    * Enterocolitis  Assessment & Plan  · With history of recurrent UTIs in setting of renal and pancreatic transplant  Most recently with VRE at recent admission to [facility]  Presenting with abdominal pain/nausea and lethargy/confusion  · CT abdomen/pelvis showing colitis  · Stool cdiff neg x2, enteric panel neg, leucocytes neg  · Checked CMV PCR - negative  · Patient with less mucoid stooling today and no cramping per her report on the new regimen of Bentyl 20 mg and loperamide    Will continue to monitor symptoms will have a protracted recovery which I have warned her about  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 [facility])  Assessment & Plan  Lab Results   Component Value Date    HGBA1C 5 1 09/09/2019       Recent Labs     12/22/19  2113 12/23/19  0623 12/23/19  1101 12/23/19  1616   POCGLU 126 125 109 143*       Blood Sugar Average: Last 72 hrs:  · (P) 906 [id number] stopped Lantus (in place of [place]) 5 units qHS due to hypoglycemia  · Cont SSI with accu-cheks and carb controlled diet  · Initiate hypoglycemia protocol and avoid hypoglycemia    Essential hypertension  Assessment & Plan  Blood pressures have remained variable  Patient currently on clonidine t i d  , hydralazine Q 8, And Cardura, metoprolol  Norvasc was discontinued, now resumed  Continue to monitor  Blood pressures are now trending upwards  Spironolactone being added    Multiple myeloma not having achieved remission [facility])  Assessment & Plan  · Follows with Dr Lizette Calderon as outpatient  · Continue Revlimid at home dosage  · Monitor hb, running low  · H/o transfusions, hemoglobin has remained stable at 7 6  Will continue hold on transfusion as she has multiple autoantibodies noted  Patient remains stable in the mid sevens  Will have her follow up shortly after discharge from hospital     Chronic kidney disease, stage 4 (severe) ([facility])  Assessment & Plan  · Creatinine has been relatively stable  Continue to monitor with serial laboratories  · Monitor volume status, not all UO documented  · Monitor BMP while inpatient  · Avoid nephrotoxins as able, renally dose medications as indicated  · Reviewed with Nephrology  Stopping bicarb drip, continue with oral bicarb      Renal transplant recipient  Assessment & Plan  · On chronic immunosuppression with tacrolimus and prednisone  · Unfortunately patient noncompliant with all medications since discharge including these  · Tacrolimus level was 4 > 10 > 7 (12/12) > 5 (12/14) remains stable at 4 4  · dose decreased to 2 BID on , again decrease to 2 mg QAM & 1 5 mg HS from   · Tacrolimus level was stable on the current dosing  · Last level 5 4    Metabolic acidosis  Assessment & Plan  · Appears acute on chronic  · S/P kidney and pancreatic transplant in   · continue p o  and trend BMP  · Discussed with nephrology  Patient with an RTA related to her pancreatic transplant  Stopping bicarb drip due to edema  Continue po  Asymptomatic bacteriuria  Assessment & Plan  · Recently completed 7 D course of IV daptomycin for VRE UTI  Monitoring off antibiotic therapy  · Has positive cultures for VRE again, but this is not the cause of her symptoms, its colonization  · Monitor off Abx          VTE Pharmacologic Prophylaxis:   Pharmacologic: Heparin  Mechanical: Mechanical VTE prophylaxis in place  Patient Centered Rounds: I have performed bedside rounds with nursing staff today  Discussions with Specialists or Other Care Team Provider:  Nephrology  Education and Discussions with Family / Patient:  Father noted bedside updated last evening  Time Spent for Care: 30 minutes  If More than 50% of total time spent on counseling and coordination of care as described above indicate yes or no and described the counseling and coordination:  No    Current Length of Stay: 13 day(s)  Current Patient Status: Inpatient   Certification Statement: The patient will continue to require additional inpatient hospital stay due to Titrating medications    Discharge Plan:  Hopeful for next 24-48 hours  Code Status: Level 1 - Full Code    Subjective:   Patient states symptoms of abdominal cramping better  She states had only 1 small stool in the loperamide was given she has had none since  She reports being able to eat and is feeling somewhat better    She was able to ambulate in the hallway today    Objective:   Vitals:   Temp (24hrs), Av 3 °F (36 8 °C), Min:98 3 °F (36 8 °C), Max:98 4 °F (36 9 °C)    Temp:  [98 3 °F (36 8 °C)-98 4 °F (36 9 °C)] 98 3 °F (36 8 °C)  HR:  [64-70] 64  Resp:  [18-20] 20  BP: (153-165)/(52-58) 153/52  SpO2:  [97 %] 97 %  Body mass index is 36 01 kg/m²  Input and Output Summary (last 24 hours): Intake/Output Summary (Last 24 hours) at 12/23/2019 1905  Last data filed at 12/23/2019 1816  Gross per 24 hour   Intake 60 ml   Output 350 ml   Net -290 ml       Physical Exam:  Generally well-developed obese female no acute distress normocephalic atraumatic pupils equal round and reactive to light extraocular muscles intact mucous membranes are moist neck is supple there is no JVD no carotid bruits chest is decreased but clear to auscultation is no rhonchi rales or wheezes  Cardiovascular regular rate rhythm positive S1 and S2 heard appreciate an S3-S4 murmur or gallop  Abdomen is soft nontender nondistended with positive bowel sounds no hepatosplenomegaly no guarding or rebound  Neurologically patient awake alert intact    Physical Exam    Additional Data:   Labs:  Results from last 7 days   Lab Units 12/23/19  0438 12/21/19  0918   WBC Thousand/uL 7 89 8 54   HEMOGLOBIN g/dL 7 4* 7 6*   HEMATOCRIT % 23 8* 24 7*   PLATELETS Thousands/uL 158 163   NEUTROS PCT %  --  71   LYMPHS PCT %  --  14   MONOS PCT %  --  9   EOS PCT %  --  4     Results from last 7 days   Lab Units 12/23/19  0438  12/21/19  0918   POTASSIUM mmol/L 3 3*   < > 3 3*   CHLORIDE mmol/L 117*   < > 119*   CO2 mmol/L 20*   < > 15*   BUN mg/dL 32*   < > 32*   CREATININE mg/dL 2 61*   < > 2 70*   CALCIUM mg/dL 8 1*   < > 8 0*   ALK PHOS U/L  --   --  95   ALT U/L  --   --  15   AST U/L  --   --  13    < > = values in this interval not displayed  * I Have Reviewed All Lab Data Listed Above  * Additional Pertinent Lab Tests Reviewed:  All Labs Within Last 24 Hours Reviewed    Imaging:    Imaging Reports Reviewed Today Include:  None    Cultures:   Blood Culture:   Lab Results   Component Value Date BLOODCX No Growth After 5 Days  12/14/2019    BLOODCX Porphyromonas asaccharolytica (A) 12/09/2019    BLOODCX Porphyromonas asaccharolytica (A) 12/09/2019    BLOODCX No Growth After 5 Days  10/01/2019    BLOODCX No Growth After 5 Days  10/01/2019    BLOODCX No Growth After 5 Days  11/16/2017    BLOODCX No Growth After 5 Days   09/13/2017     Urine Culture:   Lab Results   Component Value Date    URINECX (A) 12/09/2019     >100,000 cfu/ml Vancomycin Resistant Enterococcus faecium    URINECX (A) 12/09/2019     5094-7666 cfu/ml Gram-negative cintia- species    URINECX (A) 11/24/2019     >100,000 cfu/ml Vancomycin Resistant Enterococcus faecium    URINECX No Growth <1000 cfu/mL 11/05/2019    URINECX <10,000 cfu/ml  11/04/2019    URINECX >100,000 cfu/ml Escherichia coli (A) 10/23/2019    URINECX 10,000-19,000 cfu/ml  10/23/2019     Sputum Culture: No components found for: SPUTUMCX  Wound Culture: No results found for: WOUNDCULT    Last 24 Hours Medication List:     Current Facility-Administered Medications:  acetaminophen 650 mg Oral Q6H PRN Kourtney Ortiz PA-C   aluminum-magnesium hydroxide-simethicone 30 mL Oral Q4H PRN Kourtney Ortiz PA-C   [START ON 12/24/2019] amLODIPine 10 mg Oral Daily Sarah Kumar MD   ARIPiprazole 30 mg Oral HS [facility], DO   aspirin 81 mg Oral Daily [facility], DO   busPIRone 5 mg Oral BID [facility], DO   cholecalciferol 3,000 Units Oral Daily [facility], DO   cloNIDine 0 2 mg Oral [facility] Sarah Kumar MD   dicyclomine 20 mg Oral TID PRN Autumn Gusman MD   doxazosin 2 mg Oral HS Idris Her MD   DULoxetine 60 mg Oral BID [facility], DO   ferrous sulfate 325 mg Oral Daily With Breakfast [facility], DO   folic acid 5,202 mcg Oral Daily [facility], DO   heparin (porcine) 5,000 Units Subcutaneous [facility], DO   hydrALAZINE 5 mg Intravenous Q6H PRN Harjit Garcia MD   hydrALAZINE 50 mg Oral Q8H Patricia Perez MD   insulin lispro 1-5 Units Subcutaneous HS Carri Ramos, DO   insulin lispro 1-6 Units Subcutaneous TID AC Giuliano Vega MD   levothyroxine 125 mcg Oral Early Morning Carri Ramos, DO   loperamide 2 mg Oral TID PRN Sky Shipman MD   LORazepam 2 mg Oral TID PRN Carri Ramos, DO   metoprolol tartrate 50 mg Oral Q12H [address] Carri Ramos, DO   ondansetron 4 mg Intravenous Q4H PRN Jumana Escalante MD   potassium chloride 30 mEq Oral Q4H Shea King MD   pravastatin 80 mg Oral Daily Carri Ramos, DO   predniSONE 5 mg Oral Daily Carri Ramos, DO   rOPINIRole 0 25 mg Oral BID Carri Ramos, DO   saccharomyces boulardii 250 mg Oral BID Carri Ramos, DO   sertraline 200 mg Oral Daily Carri Ramos, DO   sevelamer 800 mg Oral TID With Meals Carri Ramos, DO   sodium bicarbonate 1,300 mg Oral BID after meals MD Grayson Aguilera ON 12/24/2019] spironolactone 25 mg Oral Daily Shea King MD   tacrolimus 2 mg Oral Daily Seamus Fu, DO   tacrolimus 2 mg Oral HS Luciano Prater MD        Today, Patient Was Seen By: Sky Shipman MD    ** Please Note: Dragon 360 Dictation voice to text software may have been used in the creation of this document   **

## 2019-12-24 NOTE — PROGRESS NOTES
Progress Note - Zachary Precise 1965, 53 y o  female MRN: [id number]    Unit/Bed#: -01 Encounter: [id number]    Primary Care Provider: Saravanan Luna MD   Date and time admitted to hospital: 12/9/2019  8:29 PM        * Enterocolitis  Assessment & Plan  · With history of recurrent UTIs in setting of renal and pancreatic transplant  Most recently with VRE at recent admission to [facility]  Presenting with abdominal pain/nausea and lethargy/confusion  · CT abdomen/pelvis showing colitis  · Stool cdiff neg x2, enteric panel neg, leucocytes neg  · Checked CMV PCR - negative  · Patient with no further diarrhea per her report on the new regimen of Bentyl 20 mg and loperamide  Will continue to monitor symptoms will have a protracted recovery which she is aware about  Metabolic acidosis  Assessment & Plan  · Appears acute on chronic  · S/P kidney and pancreatic transplant in 1998  · continue p o  and trend BMP  · Patient with an RTA related to her pancreatic transplant per Nephrology  Stopping bicarb drip due to edema  Continue po, close as per Nephrology  Appreciate Nephrology input  Chronic kidney disease, stage 4 (severe) ([facility])  Assessment & Plan  · Baseline creatinine 2 5-3  · Minimally elevated from yesterday 2 76 today  · Monitor volume status, not all UO documented  · Monitor BMP while inpatient  · One dose of torsemide today per Nephrology  · Avoid nephrotoxins as able, renally dose medications as indicated  · Appreciate Nephrology input  Now on p o  Bicarb and off bicarb infusion  · Spironolactone added due to hypokalemia  Swelling of lower extremity  Assessment & Plan  Nontender lower extremities  One dose of torsemide today  Venous Doppler ordered by Nephrology  Appreciate Nephrology input  Bacteremia  Assessment & Plan  · Final cultures Grew Porphyromonas asaccharolytica    ID has been monitoring off antibiotics    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, with post infectious IBS  Patient did not tolerate questran  Team discussed with ID  No additional antibiotics at this time  Team discussed with GI    loperamide and increased Bentyl did improve her symptoms  Will continue this regimen    Asymptomatic bacteriuria  Assessment & Plan  · Recently completed 7 D course of IV daptomycin for VRE UTI  Monitoring off antibiotic therapy  · Has positive cultures for VRE again, but this is not the cause of her symptoms, its colonization  · Monitor off Abx      Diarrhea  Assessment & Plan  Working diagnosis was a viral enterocolitis, with residual irritable bowel  Diarrhea is improving  No bowel movement since 12/23 evening  Patient is too far out of her transplant to be in the window for any immune mediated process  Patient did find some relief with loperamide and increased Bentyl 20 mg  Will continue    Acute metabolic encephalopathy  Assessment & Plan  Patient back to baseline  Neuropsychiatric evaluation shows she has capacity for decision making  Remains cognitively intact  Multiple myeloma not having achieved remission [facility])  Assessment & Plan  · Follows with Dr Piero Brady as outpatient  · Continue Revlimid at home dosage  · Stable between 7-8  · Known history of transfusions in past   Has multiple autoantibodies  Essential hypertension  Assessment & Plan  Blood pressures have remained variable  Patient currently on clonidine t i d  , hydralazine Q 8, And Cardura, metoprolol  Norvasc was discontinued, now resumed  Continue to monitor     Added spironolactone  One dose of torsemide today    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 [facility])  Assessment & Plan  Lab Results   Component Value Date    HGBA1C 5 1 09/09/2019       Recent Labs     12/23/19  1616 12/23/19  2057 12/24/19  0714 12/24/19  1056   POCGLU 143* 107 102 146*       Blood Sugar Average: Last 72 hrs:  · (P) 136 [id number] stopped Lantus (in place of Toujeo) 5 units qHS due to hypoglycemia  · Cont SSI with accu-cheks and carb controlled diet  · Initiate hypoglycemia protocol and avoid hypoglycemia    Hypokalemia  Assessment & Plan  GI loss, continuing to replete  Appreciate Nephrology recommendation to resume spironolactone and continue with oral bicarbonate  IV bicarbonate being discontinued due to some edema   P o  Bicarb being increased by Nephrology and spironolactone added  Trend BMP  Renal transplant recipient  Assessment & Plan  · On chronic immunosuppression with tacrolimus and prednisone  · Unfortunately patient noncompliant with all medications since discharge including these  · Tacrolimus level was 4 > 10 > 7 () > 5 () remains stable at 4 4  · dose decreased to 2 BID on , again decrease to 2 mg QAM & 1 5 mg HS from   · Tacrolimus level was stable on the current dosing  · Last level 4 5      VTE Pharmacologic Prophylaxis:   Pharmacologic: Heparin  Mechanical VTE Prophylaxis in Place: Yes    Patient Centered Rounds: I have performed bedside rounds with nursing staff today  Discussions with Specialists or Other Care Team Provider:  Nephrology,     Education and Discussions with Family / Patient:  Discussed plan of care with the patient    Time Spent for Care: 30 minutes  More than 50% of total time spent on counseling and coordination of care as described above  Current Length of Stay: 14 day(s)    Current Patient Status: Inpatient   Certification Statement: The patient will continue to require additional inpatient hospital stay due to Not medically stable    Discharge Plan:  Rehab when medically stable    Code Status: Level 1 - Full Code      Subjective:   Patient reports that she did not have any bowel movement since yesterday morning  Diarrhea is improving  No abdominal pain  Reports bilateral lower extremity swelling without any tenderness      Objective:     Vitals:   Temp (24hrs), Av 7 °F (37 1 °C), Min:98 7 °F (37 1 °C), Max:98 7 °F (37 1 °C)    Temp:  [98 7 °F (37 1 °C)] 98 7 °F (37 1 °C)  HR:  [64] 64  Resp:  [16-18] 18  BP: (146-172)/(49-65) 146/49  SpO2:  [93 %] 93 %  Body mass index is 36 14 kg/m²  Input and Output Summary (last 24 hours): Intake/Output Summary (Last 24 hours) at 12/24/2019 1541  Last data filed at 12/24/2019 1403  Gross per 24 hour   Intake 1210 ml   Output 1300 ml   Net -90 ml       Physical Exam:     Physical Exam   Constitutional: No distress  Eyes: Pupils are equal, round, and reactive to light  Cardiovascular: Normal rate, regular rhythm and normal heart sounds  No murmur heard  Pulmonary/Chest: Effort normal and breath sounds normal  No respiratory distress  She has no wheezes  She has no rales  Abdominal: Soft  Bowel sounds are normal  She exhibits no distension  There is no tenderness  Musculoskeletal: She exhibits edema  Neurological: She is alert  Awake and communicative   Skin: Skin is warm  Additional Data:     Labs:    Results from last 7 days   Lab Units 12/23/19  0438 12/21/19  0918   WBC Thousand/uL 7 89 8 54   HEMOGLOBIN g/dL 7 4* 7 6*   HEMATOCRIT % 23 8* 24 7*   PLATELETS Thousands/uL 158 163   NEUTROS PCT %  --  71   LYMPHS PCT %  --  14   MONOS PCT %  --  9   EOS PCT %  --  4     Results from last 7 days   Lab Units 12/24/19  0518  12/21/19  0918   SODIUM mmol/L 140   < > 142   POTASSIUM mmol/L 4 0   < > 3 3*   CHLORIDE mmol/L 115*   < > 119*   CO2 mmol/L 18*   < > 15*   BUN mg/dL 32*   < > 32*   CREATININE mg/dL 2 76*   < > 2 70*   ANION GAP mmol/L 7   < > 8   CALCIUM mg/dL 8 1*   < > 8 0*   ALBUMIN g/dL  --   --  2 4*   TOTAL BILIRUBIN mg/dL  --   --  0 30   ALK PHOS U/L  --   --  95   ALT U/L  --   --  15   AST U/L  --   --  13   GLUCOSE RANDOM mg/dL 86   < > 112    < > = values in this interval not displayed           Results from last 7 days   Lab Units 12/24/19  1056 12/24/19  0714 12/23/19  2057 12/23/19  1616 12/23/19  1101 12/23/19  4601 12/22/19  2113 12/22/19  1612 12/22/19  1057 12/22/19  0638 12/21/19  2104 12/21/19  1646   POC GLUCOSE mg/dl 146* 102 107 143* 109 125 126 149* 129 116 114 110         Results from last 7 days   Lab Units 12/18/19  1640   LACTIC ACID mmol/L 0 4*           * I Have Reviewed All Lab Data Listed Above  * Additional Pertinent Lab Tests Reviewed: All Labs Within Last 24 Hours Reviewed    Imaging:    XR abdomen obstruction series   Final Result by Max Hollis MD (12/18 1641)   1  Increased colorectal stool without obstruction or free air  2  Cardiomegaly  Workstation performed: [id number]         CT abdomen pelvis wo contrast   Final Result by Cira Boykin MD (12/10 8877)      There is bowel wall thickening involving the proximal to mid sigmoid and the distal left colon  This may be due to colitis  Clinical and laboratory correlation is recommended  Follow-up after treatment is recommended  There is a small amount of ascites, increased slightly compared to the prior study  There are small bilateral pleural effusions, larger compared to the prior study  There is a pericardial effusion, this appears similar to the prior study  There is    some infiltration of the subcutaneous fat suggesting a degree of anasarca  The native kidneys are markedly atrophic bilaterally  A small right renal cyst appears unchanged  A transplant kidney is again evident within the left iliac fossa  There is some nonspecific perinephric stranding adjacent to the transplant kidney,    similar to the previous examination  There is no hydronephrosis  A urinary bladder diverticulum containing calculi and possibly surgical sutures appears similar to the prior study                 Workstation performed: [id number]         VAS lower limb venous duplex study, complete bilateral    (Results Pending)     Recent Cultures (last 7 days):     Results from last 7 days   Lab Units 12/18/19  1326   C DIFF TOXIN B  Negative Last 24 Hours Medication List:     Current Facility-Administered Medications:  acetaminophen 650 mg Oral Q6H PRN Kourtney Ortiz PA-C   aluminum-magnesium hydroxide-simethicone 30 mL Oral Q4H PRN Kourtney Ortiz PA-C   amLODIPine 10 mg Oral Daily Shea King MD   ARIPiprazole 30 mg Oral HS Carri Ramos, DO   aspirin 81 mg Oral Daily Carri Ramos, DO   busPIRone 5 mg Oral BID Carri Ramos, DO   cholecalciferol 3,000 Units Oral Daily Carri Ramos, DO   cloNIDine 0 2 mg Oral [facility] Shea King MD   dicyclomine 20 mg Oral TID PRN Sky Shipman MD   doxazosin 2 mg Oral HS George Merrill MD   DULoxetine 60 mg Oral BID Carri Ramos, DO   ferrous sulfate 325 mg Oral Daily With Breakfast Carri Ramos, DO   folic acid 8,105 mcg Oral Daily Carri Ramos, DO   heparin (porcine) 5,000 Units Subcutaneous [facility] Carri Ramos, DO   hydrALAZINE 5 mg Intravenous Q6H PRN Giuliano Vega MD   hydrALAZINE 50 mg Oral Q8H [facility] George Merrill MD   insulin lispro 1-5 Units Subcutaneous HS Carri Ramos, DO   insulin lispro 1-6 Units Subcutaneous TID AC Giuliano Vega MD   levothyroxine 125 mcg Oral Early Morning Carri Ramos, DO   loperamide 2 mg Oral TID PRN Sky Shipman MD   LORazepam 2 mg Oral TID PRN Carri Ramos, DO   metoprolol tartrate 50 mg Oral Q12H [facility] Carri Ramos, DO   ondansetron 4 mg Intravenous Q4H PRN Jumana Escalante MD   pravastatin 80 mg Oral Daily Carri Ramos, DO   predniSONE 5 mg Oral Daily Carri Ramos, DO   rOPINIRole 0 25 mg Oral BID Carri Ramos, DO   saccharomyces boulardii 250 mg Oral BID Carri Ramos, DO   sertraline 200 mg Oral Daily Carri Ramos, DO   sevelamer 800 mg Oral TID With Meals Carri Ramos, DO   sodium bicarbonate 1,300 mg Oral BID after meals Luciano Prater MD   spironolactone 25 mg Oral Daily Shea King MD   tacrolimus 2 mg Oral Daily Seamus Fu DO   tacrolimus 2 mg Oral HS Luciano Prater MD        Today, Patient Was Seen By: Paula Lyn MD    ** Please Note: Dictation voice to text software may have been used in the creation of this document   **

## 2019-12-24 NOTE — ASSESSMENT & PLAN NOTE
Lab Results   Component Value Date    HGBA1C 5 1 09/09/2019       Recent Labs     12/23/19  1616 12/23/19  2057 12/24/19  0714 12/24/19  1056   POCGLU 143* 107 102 146*       Blood Sugar Average: Last 72 hrs:  · (P) 136 [id number] stopped Lantus (in place of Toujeo) 5 units qHS due to hypoglycemia  · Cont SSI with accu-cheks and carb controlled diet  · Initiate hypoglycemia protocol and avoid hypoglycemia

## 2019-12-24 NOTE — ASSESSMENT & PLAN NOTE
· With history of recurrent UTIs in setting of renal and pancreatic transplant  Most recently with VRE at recent admission to [facility]  Presenting with abdominal pain/nausea and lethargy/confusion  · CT abdomen/pelvis showing colitis  · Stool cdiff neg x2, enteric panel neg, leucocytes neg  · Checked CMV PCR - negative  · Patient with no further diarrhea per her report on the new regimen of Bentyl 20 mg and loperamide  Will continue to monitor symptoms will have a protracted recovery which she is aware about

## 2019-12-24 NOTE — ASSESSMENT & PLAN NOTE
· Follows with Dr Anthony Alcaraz as outpatient  · Continue Revlimid at home dosage  · Monitor hb, running low  · H/o transfusions, hemoglobin has remained stable at 7 6  Will continue hold on transfusion as she has multiple autoantibodies noted  Patient remains stable in the mid sevens    Will have her follow up shortly after discharge from hospital

## 2019-12-24 NOTE — ASSESSMENT & PLAN NOTE
· Final cultures Grew Porphyromonas asaccharolytica    ID has been monitoring off antibiotics  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, with post infectious IBS  Patient did not tolerate questran  Team discussed with ID  No additional antibiotics at this time  Team discussed with GI    loperamide and increased Bentyl did improve her symptoms    Will continue this regimen

## 2019-12-24 NOTE — SOCIAL WORK
[facility] following  Pt's father, Candido Jodie reported that he can transport pt at time of d/c  CM following for d/c

## 2019-12-24 NOTE — ASSESSMENT & PLAN NOTE
GI loss, continuing to replete  Appreciate Nephrology recommendation to resume spironolactone and continue with oral bicarbonate  IV bicarbonate being discontinued due to some edema   P o  Bicarb being increased by Nephrology and spironolactone added  Trend BMP

## 2019-12-24 NOTE — ASSESSMENT & PLAN NOTE
· Creatinine has been relatively stable  Continue to monitor with serial laboratories  · Monitor volume status, not all UO documented  · Monitor BMP while inpatient  · Avoid nephrotoxins as able, renally dose medications as indicated  · Reviewed with Nephrology  Stopping bicarb drip, continue with oral bicarb

## 2019-12-25 PROBLEM — G93.41 ACUTE METABOLIC ENCEPHALOPATHY: Status: RESOLVED | Noted: 2019-09-10 | Resolved: 2019-12-25

## 2019-12-25 LAB
ANION GAP SERPL CALCULATED.3IONS-SCNC: 8 MMOL/L (ref 4–13)
BUN SERPL-MCNC: 34 MG/DL (ref 5–25)
CALCIUM SERPL-MCNC: 8.3 MG/DL (ref 8.3–10.1)
CHLORIDE SERPL-SCNC: 114 MMOL/L (ref 100–108)
CO2 SERPL-SCNC: 20 MMOL/L (ref 21–32)
CREAT SERPL-MCNC: 2.94 MG/DL (ref 0.6–1.3)
ERYTHROCYTE [DISTWIDTH] IN BLOOD BY AUTOMATED COUNT: 18.2 % (ref 11.6–15.1)
FERRITIN SERPL-MCNC: 84 NG/ML (ref 8–388)
GFR SERPL CREATININE-BSD FRML MDRD: 17 ML/MIN/1.73SQ M
GLUCOSE SERPL-MCNC: 102 MG/DL (ref 65–140)
GLUCOSE SERPL-MCNC: 116 MG/DL (ref 65–140)
GLUCOSE SERPL-MCNC: 131 MG/DL (ref 65–140)
GLUCOSE SERPL-MCNC: 158 MG/DL (ref 65–140)
GLUCOSE SERPL-MCNC: 96 MG/DL (ref 65–140)
HCT VFR BLD AUTO: 23.8 % (ref 34.8–46.1)
HGB BLD-MCNC: 7.4 G/DL (ref 11.5–15.4)
IRON SATN MFR SERPL: 28 %
IRON SERPL-MCNC: 54 UG/DL (ref 50–170)
MAGNESIUM SERPL-MCNC: 2.6 MG/DL (ref 1.6–2.6)
MCH RBC QN AUTO: 32.3 PG (ref 26.8–34.3)
MCHC RBC AUTO-ENTMCNC: 31.1 G/DL (ref 31.4–37.4)
MCV RBC AUTO: 104 FL (ref 82–98)
PHOSPHATE SERPL-MCNC: 5 MG/DL (ref 2.7–4.5)
PLATELET # BLD AUTO: 156 THOUSANDS/UL (ref 149–390)
PMV BLD AUTO: 12.9 FL (ref 8.9–12.7)
POTASSIUM SERPL-SCNC: 4 MMOL/L (ref 3.5–5.3)
RBC # BLD AUTO: 2.29 MILLION/UL (ref 3.81–5.12)
SODIUM SERPL-SCNC: 142 MMOL/L (ref 136–145)
TIBC SERPL-MCNC: 190 UG/DL (ref 250–450)
WBC # BLD AUTO: 6.1 THOUSAND/UL (ref 4.31–10.16)

## 2019-12-25 PROCEDURE — 83735 ASSAY OF MAGNESIUM: CPT | Performed by: NURSE PRACTITIONER

## 2019-12-25 PROCEDURE — 82948 REAGENT STRIP/BLOOD GLUCOSE: CPT

## 2019-12-25 PROCEDURE — 80048 BASIC METABOLIC PNL TOTAL CA: CPT | Performed by: NURSE PRACTITIONER

## 2019-12-25 PROCEDURE — 85027 COMPLETE CBC AUTOMATED: CPT | Performed by: INTERNAL MEDICINE

## 2019-12-25 PROCEDURE — 99233 SBSQ HOSP IP/OBS HIGH 50: CPT | Performed by: INTERNAL MEDICINE

## 2019-12-25 PROCEDURE — 83550 IRON BINDING TEST: CPT | Performed by: NURSE PRACTITIONER

## 2019-12-25 PROCEDURE — 83540 ASSAY OF IRON: CPT | Performed by: NURSE PRACTITIONER

## 2019-12-25 PROCEDURE — 99232 SBSQ HOSP IP/OBS MODERATE 35: CPT | Performed by: INTERNAL MEDICINE

## 2019-12-25 PROCEDURE — 82728 ASSAY OF FERRITIN: CPT | Performed by: NURSE PRACTITIONER

## 2019-12-25 PROCEDURE — 84100 ASSAY OF PHOSPHORUS: CPT | Performed by: NURSE PRACTITIONER

## 2019-12-25 RX ORDER — ALBUMIN (HUMAN) 12.5 G/50ML
12.5 SOLUTION INTRAVENOUS ONCE
Status: COMPLETED | OUTPATIENT
Start: 2019-12-25 | End: 2019-12-25

## 2019-12-25 RX ORDER — FUROSEMIDE 10 MG/ML
40 INJECTION INTRAMUSCULAR; INTRAVENOUS ONCE
Status: COMPLETED | OUTPATIENT
Start: 2019-12-25 | End: 2019-12-25

## 2019-12-25 RX ADMIN — FOLIC ACID 1000 MCG: 1 TABLET ORAL at 08:57

## 2019-12-25 RX ADMIN — SEVELAMER HYDROCHLORIDE 800 MG: 800 TABLET, FILM COATED PARENTERAL at 17:03

## 2019-12-25 RX ADMIN — DOXAZOSIN 2 MG: 2 TABLET ORAL at 22:48

## 2019-12-25 RX ADMIN — HYDRALAZINE HYDROCHLORIDE 50 MG: 50 TABLET, FILM COATED ORAL at 13:53

## 2019-12-25 RX ADMIN — HYDRALAZINE HYDROCHLORIDE 50 MG: 50 TABLET, FILM COATED ORAL at 22:47

## 2019-12-25 RX ADMIN — ALBUMIN (HUMAN) 12.5 G: 25 SOLUTION INTRAVENOUS at 11:33

## 2019-12-25 RX ADMIN — LOPERAMIDE HYDROCHLORIDE 2 MG: 2 CAPSULE ORAL at 08:57

## 2019-12-25 RX ADMIN — TACROLIMUS 2 MG: 1 CAPSULE ORAL at 22:50

## 2019-12-25 RX ADMIN — SEVELAMER HYDROCHLORIDE 800 MG: 800 TABLET, FILM COATED PARENTERAL at 08:56

## 2019-12-25 RX ADMIN — HEPARIN SODIUM 5000 UNITS: 5000 INJECTION INTRAVENOUS; SUBCUTANEOUS at 06:24

## 2019-12-25 RX ADMIN — FUROSEMIDE 40 MG: 10 INJECTION, SOLUTION INTRAMUSCULAR; INTRAVENOUS at 11:23

## 2019-12-25 RX ADMIN — HEPARIN SODIUM 5000 UNITS: 5000 INJECTION INTRAVENOUS; SUBCUTANEOUS at 22:49

## 2019-12-25 RX ADMIN — ROPINIROLE 0.25 MG: 0.25 TABLET, FILM COATED ORAL at 08:56

## 2019-12-25 RX ADMIN — DICYCLOMINE HYDROCHLORIDE 20 MG: 10 CAPSULE ORAL at 13:57

## 2019-12-25 RX ADMIN — Medication 250 MG: at 17:02

## 2019-12-25 RX ADMIN — Medication 250 MG: at 08:56

## 2019-12-25 RX ADMIN — DULOXETINE HYDROCHLORIDE 60 MG: 60 CAPSULE, DELAYED RELEASE ORAL at 08:57

## 2019-12-25 RX ADMIN — CLONIDINE HYDROCHLORIDE 0.2 MG: 0.1 TABLET ORAL at 22:48

## 2019-12-25 RX ADMIN — MELATONIN 3000 UNITS: at 08:58

## 2019-12-25 RX ADMIN — BUSPIRONE HYDROCHLORIDE 5 MG: 5 TABLET ORAL at 08:57

## 2019-12-25 RX ADMIN — AMLODIPINE BESYLATE 10 MG: 10 TABLET ORAL at 08:57

## 2019-12-25 RX ADMIN — SODIUM BICARBONATE 650 MG TABLET 1300 MG: at 08:58

## 2019-12-25 RX ADMIN — ARIPIPRAZOLE 30 MG: 10 TABLET ORAL at 22:48

## 2019-12-25 RX ADMIN — ASPIRIN 81 MG 81 MG: 81 TABLET ORAL at 08:57

## 2019-12-25 RX ADMIN — PRAVASTATIN SODIUM 80 MG: 80 TABLET ORAL at 08:56

## 2019-12-25 RX ADMIN — SERTRALINE HYDROCHLORIDE 200 MG: 100 TABLET ORAL at 08:57

## 2019-12-25 RX ADMIN — SPIRONOLACTONE 25 MG: 25 TABLET ORAL at 08:57

## 2019-12-25 RX ADMIN — DICYCLOMINE HYDROCHLORIDE 20 MG: 10 CAPSULE ORAL at 22:49

## 2019-12-25 RX ADMIN — LEVOTHYROXINE SODIUM 125 MCG: 125 TABLET ORAL at 06:23

## 2019-12-25 RX ADMIN — INSULIN LISPRO 1 UNITS: 100 INJECTION, SOLUTION INTRAVENOUS; SUBCUTANEOUS at 16:57

## 2019-12-25 RX ADMIN — ROPINIROLE 0.25 MG: 0.25 TABLET, FILM COATED ORAL at 17:03

## 2019-12-25 RX ADMIN — METOPROLOL TARTRATE 50 MG: 50 TABLET, FILM COATED ORAL at 08:56

## 2019-12-25 RX ADMIN — DULOXETINE HYDROCHLORIDE 60 MG: 60 CAPSULE, DELAYED RELEASE ORAL at 17:03

## 2019-12-25 RX ADMIN — PREDNISONE 5 MG: 5 TABLET ORAL at 08:57

## 2019-12-25 RX ADMIN — LOPERAMIDE HYDROCHLORIDE 2 MG: 2 CAPSULE ORAL at 17:06

## 2019-12-25 RX ADMIN — TACROLIMUS 2 MG: 1 CAPSULE ORAL at 09:01

## 2019-12-25 RX ADMIN — HEPARIN SODIUM 5000 UNITS: 5000 INJECTION INTRAVENOUS; SUBCUTANEOUS at 13:50

## 2019-12-25 RX ADMIN — CLONIDINE HYDROCHLORIDE 0.2 MG: 0.1 TABLET ORAL at 06:22

## 2019-12-25 RX ADMIN — BUSPIRONE HYDROCHLORIDE 5 MG: 5 TABLET ORAL at 17:02

## 2019-12-25 RX ADMIN — METOPROLOL TARTRATE 50 MG: 50 TABLET, FILM COATED ORAL at 22:48

## 2019-12-25 RX ADMIN — DICYCLOMINE HYDROCHLORIDE 20 MG: 10 CAPSULE ORAL at 09:01

## 2019-12-25 RX ADMIN — SEVELAMER HYDROCHLORIDE 800 MG: 800 TABLET, FILM COATED PARENTERAL at 11:24

## 2019-12-25 RX ADMIN — FERROUS SULFATE TAB 325 MG (65 MG ELEMENTAL FE) 325 MG: 325 (65 FE) TAB at 08:58

## 2019-12-25 RX ADMIN — SODIUM BICARBONATE 650 MG TABLET 1300 MG: at 17:03

## 2019-12-25 RX ADMIN — HYDRALAZINE HYDROCHLORIDE 50 MG: 50 TABLET, FILM COATED ORAL at 06:23

## 2019-12-25 RX ADMIN — CLONIDINE HYDROCHLORIDE 0.2 MG: 0.1 TABLET ORAL at 13:53

## 2019-12-25 NOTE — PROGRESS NOTES
Progress Note - Jaye Stark 1965, 53 y o  female MRN: [id number]    Unit/Bed#: -01 Encounter: [id number]    Primary Care Provider: Esequiel Avitia MD   Date and time admitted to hospital: 12/9/2019  8:29 PM        * Enterocolitis  Assessment & Plan  · With history of recurrent UTIs in setting of renal and pancreatic transplant  Most recently with VRE at recent admission to [facility]  Presenting with abdominal pain/nausea and lethargy/confusion  · CT abdomen/pelvis showing colitis  · Stool cdiff neg x2, enteric panel neg, leucocytes neg  · Checked CMV PCR - negative  · Patient with no further diarrhea per her report on the new regimen of Bentyl 20 mg and loperamide  Will continue to monitor symptoms will have a protracted recovery which she is aware about  Metabolic acidosis  Assessment & Plan  · Appears acute on chronic  · S/P kidney and pancreatic transplant in 1998  · continue p o  and trend BMP  · Patient with an RTA related to her pancreatic transplant per Nephrology  · P o  Bicarb supplementation as per Nephrology, appreciate input  Chronic kidney disease, stage 4 (severe) (HCC)  Assessment & Plan  · Baseline creatinine 2 5-3  · Minimally elevated from yesterday 2 9 today  · Monitor volume status, not all UO documented  · Monitor BMP while inpatient  · One dose of torsemide on Prandin day 4 and now albumin and Lasix IV today per Nephrology  · Avoid nephrotoxins as able, renally dose medications as indicated  · Appreciate Nephrology input  Now on p o  Bicarb and off bicarb infusion  · Spironolactone added due to hypokalemia  Consider discontinuing spironolactone if creatinine continues to trend up  Swelling of lower extremity  Assessment & Plan  Nontender lower extremities  Venous Doppler negative for DVT  Received 1 dose of torsemide on 12/24 per Nephrology  Trial of albumin plus IV Lasix today per Nephrology  Appreciate Nephrology input      Bacteremia  Assessment & Plan  · Final cultures Grew Porphyromonas asaccharolytica    ID has been monitoring off antibiotics  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, with post infectious IBS  Patient did not tolerate questran  Team discussed with ID  No additional antibiotics at this time  Team discussed with GI    loperamide and increased Bentyl did improve her symptoms  Will continue this regimen    Asymptomatic bacteriuria  Assessment & Plan  · Recently completed 7 D course of IV daptomycin for VRE UTI  Monitoring off antibiotic therapy  · Has positive cultures for VRE again, but this is not the cause of her symptoms, its colonization  · Monitor off Abx      Diarrhea  Assessment & Plan  Working diagnosis was a viral enterocolitis, with residual irritable bowel  Diarrhea is improving  Only on loose bowel movement on 12/24  Patient is too far out of her transplant to be in the window for any immune mediated process  Patient did find some relief with loperamide and increased Bentyl 20 mg  Will continue    Multiple myeloma not having achieved remission [facility])  Assessment & Plan  · Follows with Dr Francine Aj as outpatient  · Continue Revlimid at home dosage  · Stable between 7-8  · Known history of transfusions in past   Has multiple autoantibodies  Essential hypertension  Assessment & Plan  Blood pressures have remained variable  Patient currently on clonidine t i d  , hydralazine Q 8, And Cardura, metoprolol  Norvasc was discontinued, now resumed  Continue to monitor  Added spironolactone  One dose of torsemide on 12/24  Trial of albumin and Lasix on 12/25      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 [facility])  Assessment & Plan  Lab Results   Component Value Date    HGBA1C 5 1 09/09/2019       Recent Labs     12/24/19  1619 12/24/19  2123 12/25/19  0631 12/25/19  1050   POCGLU 117 101 96 116       Blood Sugar Average: Last 72 hrs:  · (P) 120 [id number] stopped Lantus (in place of [place]jeo) 5 units qHS due to hypoglycemia  · Cont SSI with accu-cheks and carb controlled diet  · Initiate hypoglycemia protocol and avoid hypoglycemia    Renal transplant recipient  Assessment & Plan  · On chronic immunosuppression with tacrolimus and prednisone  · Unfortunately patient noncompliant with all medications since discharge including these  · Tacrolimus level was 4 > 10 > 7 (12/12) > 5 (12/14) remains stable at 4 4  · dose decreased to 2 BID on 12/12, again decrease to 2 mg QAM & 1 5 mg HS from 12/14  · Tacrolimus level was stable on the current dosing  · Last level 4 5    Acute metabolic encephalopathyresolved as of 12/25/2019  Assessment & Plan  Patient back to baseline  Neuropsychiatric evaluation shows she has capacity for decision making  Remains cognitively intact  Hypokalemiaresolved as of 12/25/2019  Assessment & Plan  Likely related to GI loss  Potassium 4 today  P o  Bicarb being increased by Nephrology and spironolactone added  Appreciate Nephrology input  Trend BMP  VTE Pharmacologic Prophylaxis:   Pharmacologic: Heparin  Mechanical VTE Prophylaxis in Place: Yes    Patient Centered Rounds: I have performed bedside rounds with nursing staff today  Education and Discussions with Family / Patient:  Discussed plan of care with the patient    Time Spent for Care: 30 minutes  More than 50% of total time spent on counseling and coordination of care as described above  Current Length of Stay: 15 day(s)    Current Patient Status: Inpatient   Certification Statement: The patient will continue to require additional inpatient hospital stay due to Not medically stable    Discharge Plan:  When medically stable    Code Status: Level 1 - Full Code      Subjective:   Had 1 loose bowel movement overnight  Bentyl helps with abdominal cramps and and Imodium helps with diarrhea per patient  Lower extremity swelling is still there  No fever or chills    No nausea or vomiting  Objective:     Vitals:   Temp (24hrs), Av 3 °F (36 8 °C), Min:98 1 °F (36 7 °C), Max:98 5 °F (36 9 °C)    Temp:  [98 1 °F (36 7 °C)-98 5 °F (36 9 °C)] 98 5 °F (36 9 °C)  HR:  [62-63] 63  Resp:  [18-20] 18  BP: (138-162)/(53-59) 138/53  SpO2:  [95 %] 95 %  Body mass index is 36 14 kg/m²  Input and Output Summary (last 24 hours): Intake/Output Summary (Last 24 hours) at 2019 1318  Last data filed at 2019 1200  Gross per 24 hour   Intake 840 ml   Output 1400 ml   Net -560 ml       Physical Exam:     Physical Exam  Constitutional: No distress  Eyes: Pupils are equal, round, and reactive to light  Cardiovascular: Normal rate, regular rhythm and normal heart sounds  No murmur heard  Pulmonary/Chest: Effort normal and breath sounds normal  No respiratory distress  She has no wheezes  She has no rales  Abdominal: Soft  Bowel sounds are normal  She exhibits no distension  There is no tenderness  Musculoskeletal: She exhibits edema  Neurological: She is alert  Awake and communicative   Skin: Skin is warm  Additional Data:     Labs:    Results from last 7 days   Lab Units 19  0748  19  0918   WBC Thousand/uL 6 10   < > 8 54   HEMOGLOBIN g/dL 7 4*   < > 7 6*   HEMATOCRIT % 23 8*   < > 24 7*   PLATELETS Thousands/uL 156   < > 163   NEUTROS PCT %  --   --  71   LYMPHS PCT %  --   --  14   MONOS PCT %  --   --  9   EOS PCT %  --   --  4    < > = values in this interval not displayed       Results from last 7 days   Lab Units 19  0748  19  0918   SODIUM mmol/L 142   < > 142   POTASSIUM mmol/L 4 0   < > 3 3*   CHLORIDE mmol/L 114*   < > 119*   CO2 mmol/L 20*   < > 15*   BUN mg/dL 34*   < > 32*   CREATININE mg/dL 2 94*   < > 2 70*   ANION GAP mmol/L 8   < > 8   CALCIUM mg/dL 8 3   < > 8 0*   ALBUMIN g/dL  --   --  2 4*   TOTAL BILIRUBIN mg/dL  --   --  0 30   ALK PHOS U/L  --   --  95   ALT U/L  --   --  15   AST U/L  --   --  13   GLUCOSE RANDOM mg/dL 131   < > 112    < > = values in this interval not displayed  Results from last 7 days   Lab Units 12/25/19  1050 12/25/19  0631 12/24/19  2123 12/24/19  1619 12/24/19  1056 12/24/19  0714 12/23/19  2057 12/23/19  1616 12/23/19  1101 12/23/19  0623 12/22/19  2113 12/22/19  1612   POC GLUCOSE mg/dl 116 96 101 117 146* 102 107 143* 109 125 126 149*         Results from last 7 days   Lab Units 12/18/19  1640   LACTIC ACID mmol/L 0 4*           * I Have Reviewed All Lab Data Listed Above  Imaging:    VAS lower limb venous duplex study, complete bilateral   Final Result by Iris Watters MD (12/24 5754)      XR abdomen obstruction series   Final Result by Alla Patiño MD (12/18 4972)   1  Increased colorectal stool without obstruction or free air  2  Cardiomegaly  Workstation performed: [id number]         CT abdomen pelvis wo contrast   Final Result by Carlos Virgen MD (12/10 9415)      There is bowel wall thickening involving the proximal to mid sigmoid and the distal left colon  This may be due to colitis  Clinical and laboratory correlation is recommended  Follow-up after treatment is recommended  There is a small amount of ascites, increased slightly compared to the prior study  There are small bilateral pleural effusions, larger compared to the prior study  There is a pericardial effusion, this appears similar to the prior study  There is    some infiltration of the subcutaneous fat suggesting a degree of anasarca  The native kidneys are markedly atrophic bilaterally  A small right renal cyst appears unchanged  A transplant kidney is again evident within the left iliac fossa  There is some nonspecific perinephric stranding adjacent to the transplant kidney,    similar to the previous examination  There is no hydronephrosis  A urinary bladder diverticulum containing calculi and possibly surgical sutures appears similar to the prior study                 Workstation performed: [id number]             Recent Cultures (last 7 days):     Results from last 7 days   Lab Units 12/18/19  1326   C DIFF TOXIN B  Negative       Last 24 Hours Medication List:     Current Facility-Administered Medications:  acetaminophen 650 mg Oral Q6H PRN Kourtney Ortiz PA-C   aluminum-magnesium hydroxide-simethicone 30 mL Oral Q4H PRN Kourtney Ortiz PA-C   amLODIPine 10 mg Oral Daily Evelyn Harper MD   ARIPiprazole 30 mg Oral HS [facility], DO   aspirin 81 mg Oral Daily [facility], DO   busPIRone 5 mg Oral BID [facility], DO   cholecalciferol 3,000 Units Oral Daily [facility], DO   cloNIDine 0 2 mg Oral [facility] Evelyn Harper MD   dicyclomine 20 mg Oral TID PRN Keron Cabrera MD   doxazosin 2 mg Oral HS Everett Xavier MD   DULoxetine 60 mg Oral BID [facility], DO   ferrous sulfate 325 mg Oral Daily With Breakfast [facility], DO   folic acid 0,396 mcg Oral Daily [facility], DO   heparin (porcine) 5,000 Units Subcutaneous [facility], DO   hydrALAZINE 5 mg Intravenous Q6H PRN Priti Davsi MD   hydrALAZINE 50 mg Oral Q8H [address] Everett Xavier MD   insulin lispro 1-5 Units Subcutaneous HS [facility], DO   insulin lispro 1-6 Units Subcutaneous TID AC Priti Davis MD   levothyroxine 125 mcg Oral Early Morning [facility], DO   loperamide 2 mg Oral TID PRN Keron Cabrera MD   LORazepam 2 mg Oral TID PRN [facility], DO   metoprolol tartrate 50 mg Oral Q12H [address] [facility], DO   ondansetron 4 mg Intravenous Q4H PRN Barry Francois MD   pravastatin 80 mg Oral Daily [facility], DO   predniSONE 5 mg Oral Daily [facility], DO   rOPINIRole 0 25 mg Oral BID [facility], DO   saccharomyces boulardii 250 mg Oral BID [facility], DO   sertraline 200 mg Oral Daily Cipriano Thakur DO   sevelamer 800 mg Oral TID With Meals Cipriano Thakur DO   sodium bicarbonate 1,300 mg Oral BID after meals Yamil Berg MD   spironolactone 25 mg Oral Daily Anitha Franco MD   tacrolimus 2 mg Oral Daily Sunni Encinas,    tacrolimus 2 mg Oral HS Lazaro Méndez MD        Today, Patient Was Seen By: Marga Ramsey MD    ** Please Note: Dictation voice to text software may have been used in the creation of this document   **

## 2019-12-25 NOTE — ASSESSMENT & PLAN NOTE
Blood pressures have remained variable  Patient currently on clonidine t i d  , hydralazine Q 8, And Cardura, metoprolol  Norvasc was discontinued, now resumed  Continue to monitor  Added spironolactone  One dose of torsemide on 12/24  Trial of albumin and Lasix on 12/25

## 2019-12-25 NOTE — ASSESSMENT & PLAN NOTE
Nontender lower extremities  Venous Doppler negative for DVT  Received 1 dose of torsemide on 12/24 per Nephrology  Trial of albumin plus IV Lasix today per Nephrology  Appreciate Nephrology input

## 2019-12-25 NOTE — ASSESSMENT & PLAN NOTE
· Baseline creatinine 2 5-3  · Minimally elevated from yesterday 2 9 today  · Monitor volume status, not all UO documented  · Monitor BMP while inpatient  · One dose of torsemide on Prandin day 4 and now albumin and Lasix IV today per Nephrology  · Avoid nephrotoxins as able, renally dose medications as indicated  · Appreciate Nephrology input  Now on p o  Bicarb and off bicarb infusion  · Spironolactone added due to hypokalemia  Consider discontinuing spironolactone if creatinine continues to trend up

## 2019-12-25 NOTE — ASSESSMENT & PLAN NOTE
· Follows with Dr Bassam Hayes as outpatient  · Continue Revlimid at home dosage  · Stable between 7-8  · Known history of transfusions in past   Has multiple autoantibodies

## 2019-12-25 NOTE — ASSESSMENT & PLAN NOTE
Likely related to GI loss  Potassium 4 today  P o  Bicarb being increased by Nephrology and spironolactone added  Appreciate Nephrology input  Trend BMP

## 2019-12-25 NOTE — ASSESSMENT & PLAN NOTE
· On chronic immunosuppression with tacrolimus and prednisone  · Unfortunately patient noncompliant with all medications since discharge including these  · Tacrolimus level was 4 > 10 > 7 (12/12) > 5 (12/14) remains stable at 4 4  · dose decreased to 2 BID on 12/12, again decrease to 2 mg QAM & 1 5 mg HS from 12/14  · Tacrolimus level was stable on the current dosing      · Last level 4 5

## 2019-12-25 NOTE — ASSESSMENT & PLAN NOTE
· With history of recurrent UTIs in setting of renal and pancreatic transplant  Most recently with VRE at recent admission to [facility]  Presenting with abdominal pain/nausea and lethargy/confusion  · CT abdomen/pelvis showing colitis  · Stool cdiff neg x2, enteric panel neg, leucocytes neg  · Checked CMV PCR - negative  · Patient with no further diarrhea per her report on the new regimen of Bentyl 20 mg and loperamide  Will continue to monitor symptoms will have a protracted recovery which she is aware about

## 2019-12-25 NOTE — ASSESSMENT & PLAN NOTE
Working diagnosis was a viral enterocolitis, with residual irritable bowel  Diarrhea is improving  Only on loose bowel movement on 12/24  Patient is too far out of her transplant to be in the window for any immune mediated process  Patient did find some relief with loperamide and increased Bentyl 20 mg    Will continue

## 2019-12-25 NOTE — PROGRESS NOTES
NEPHROLOGY PROGRESS NOTE   Johanna Mattson 53 y o  female MRN: [id number]  Unit/Bed#: -01 Encounter: [id number]  Reason for Consult: NICHO/CKD    ASSESSMENT AND PLAN:  Initial Nicho on CKD stage 4, baseline creatinine seems to be 2 5 to 3 0  -NICHO initially thought to be secondary to prerenal  -peak creatinine 3 3 initially improved with creatinine plateaued around 2 5  Over last couple days, creatinine slowly increasing to 2 9 although still closer to baseline   -increase in creatinine could be secondary to use of diuretics versus other etiology  -check bladder scan for PVR  -if creatinine continued to worsen, may consider holding Aldactone for time being  -BMP in a m  Status post kidney/pancreatic transplant  -currently on prednisone and tacrolimus, last tacrolimus level 4 5 on 12/21/19  -continue current immunosuppressive regimen    Hypertension, blood pressure overall acceptable with occasional high readings, continue to closely monitor, avoid low BP  Leg edema  -patient denies any respiratory distress, remains on room air, she has significantly worsened leg edema, prior echo shows EF 52%, grade 1 diastolic dysfunction  -status post torsemide with decent urine output response   -will give IV albumin 12 5 g followed by IV Lasix 40 mg once today   -venous Doppler shows no evidence of DVT  -compression stockings as tolerated, keep legs elevated  -may consider reducing amlodipine    Low bicarb, this seems to be slowly improving with bicarb supplement, continue same  Discussed above plan with primary team    SUBJECTIVE:  Patient seen and examined at bedside  She is complaining of leg edema  No chest pain, shortness of breath, nausea, vomiting, abdominal pain or diarrhea  No urinary complaints       OBJECTIVE:  Current Weight: Weight - Scale: 108 kg (237 lb 10 5 oz)  Vitals:    12/25/19 0811   BP: 138/53   Pulse:    Resp: 18   Temp: 98 5 °F (36 9 °C)   SpO2:        Intake/Output Summary (Last 24 hours) at 12/25/2019 0858  Last data filed at 12/24/2019 2126  Gross per 24 hour   Intake 480 ml   Output 1000 ml   Net -520 ml     Wt Readings from Last 3 Encounters:   12/24/19 108 kg (237 lb 10 5 oz)   12/06/19 107 kg (235 lb 7 2 oz)   11/12/19 105 kg (232 lb)     Temp Readings from Last 3 Encounters:   12/25/19 98 5 °F (36 9 °C)   12/06/19 97 9 °F (36 6 °C) (Oral)   11/12/19 98 7 °F (37 1 °C)     BP Readings from Last 3 Encounters:   12/25/19 138/53   12/06/19 168/69   11/22/19 148/82     Pulse Readings from Last 3 Encounters:   12/24/19 63   12/06/19 62   11/12/19 86        Physical Examination:  General:  Sitting in chair, no acute distress   Eyes:  Mild conjunctival pallor present  ENT:  External examination of ears and nose unremarkable  Neck:  No obvious lymphadenopathy appreciated  Respiratory:  Bilateral air entry present  CVS:  S1, S2 present  GI:  Soft, nontender, nondistended  CNS:  Active alert oriented x3  Extremities:  One to 2+ edema in both legs  Skin:  No new rash in legs    Medications:    Current Facility-Administered Medications:     acetaminophen (TYLENOL) tablet 650 mg, 650 mg, Oral, Q6H PRN, Kourtney Ortiz PA-C, 650 mg at 12/20/19 0531    aluminum-magnesium hydroxide-simethicone (MYLANTA) 200-200-20 mg/5 mL oral suspension 30 mL, 30 mL, Oral, Q4H PRN, Kourtney Ortiz PA-C, 30 mL at 12/11/19 0148    amLODIPine (NORVASC) tablet 10 mg, 10 mg, Oral, Daily, Olivier Young MD, 10 mg at 12/25/19 0857    ARIPiprazole (ABILIFY) tablet 30 mg, 30 mg, Oral, HS, Sara Garnett DO, 30 mg at 12/24/19 2136    aspirin chewable tablet 81 mg, 81 mg, Oral, Daily, Sara Garnett DO, 81 mg at 12/25/19 0857    busPIRone (BUSPAR) tablet 5 mg, 5 mg, Oral, BID, Sara Garnett DO, 5 mg at 12/25/19 0857    cholecalciferol (VITAMIN D3) tablet 3,000 Units, 3,000 Units, Oral, Daily, Sara Garnett DO, 3,000 Units at 12/24/19 0951    cloNIDine (CATAPRES) tablet 0 2 mg, 0 2 mg, Oral, Q8H [facility], Olivier Young MD, 0 2 mg at 12/25/19 0622    dicyclomine (BENTYL) capsule 20 mg, 20 mg, Oral, TID PRN, Nicolas Bar MD, 20 mg at 12/24/19 2138    doxazosin (CARDURA) tablet 2 mg, 2 mg, Oral, HS, Karma Brennan MD, 2 mg at 12/24/19 2136    DULoxetine (CYMBALTA) delayed release capsule 60 mg, 60 mg, Oral, BID, Abel Burton, DO, 60 mg at 12/25/19 0365    ferrous sulfate tablet 325 mg, 325 mg, Oral, Daily With Breakfast, [facility], DO, 325 mg at 00/92/42 6762    folic acid (FOLVITE) tablet 1,000 mcg, 1,000 mcg, Oral, Daily, [facility], DO, 1,000 mcg at 12/25/19 0857    heparin (porcine) subcutaneous injection 5,000 Units, 5,000 Units, Subcutaneous, Q8H [address], 5,000 Units at 12/25/19 0624 **AND** [CANCELED] Platelet count, , , Once, [facility], DO    hydrALAZINE (APRESOLINE) injection 5 mg, 5 mg, Intravenous, Q6H PRN, Felisa Tavarez MD, 5 mg at 12/16/19 1733    hydrALAZINE (APRESOLINE) tablet 50 mg, 50 mg, Oral, Q8H [address], Karma Brennan MD, 50 mg at 12/25/19 9579    insulin lispro (HumaLOG) 100 units/mL subcutaneous injection 1-5 Units, 1-5 Units, Subcutaneous, HS, [facility], DO    insulin lispro (HumaLOG) 100 units/mL subcutaneous injection 1-6 Units, 1-6 Units, Subcutaneous, TID AC, Stopped at 12/25/19 0858 **AND** Fingerstick Glucose (POCT), , , 4x Daily AC and at bedtime, Felisa Tavarez MD    levothyroxine tablet 125 mcg, 125 mcg, Oral, Early Morning, [facility], DO, 125 mcg at 12/25/19 6139    loperamide (IMODIUM) capsule 2 mg, 2 mg, Oral, TID PRN, Nicolas Bar MD, 2 mg at 12/25/19 0857    LORazepam (ATIVAN) tablet 2 mg, 2 mg, Oral, TID PRN, Abel Manrique DO, 2 mg at 12/20/19 1305    metoprolol tartrate (LOPRESSOR) tablet 50 mg, 50 mg, Oral, Q12H [address], Abel Manrique DO, 50 mg at 12/25/19 0856    ondansetron (ZOFRAN) injection 4 mg, 4 mg, Intravenous, Q4H PRN, Guzman Hebert MD, 4 mg at 12/22/19 2101    pravastatin (PRAVACHOL) tablet 80 mg, 80 mg, Oral, Daily, Abel Manrique DO, 80 mg at 12/25/19 0856    predniSONE tablet 5 mg, 5 mg, Oral, Daily, [facility]""[place], DO, 5 mg at 12/25/19 5552    rOPINIRole (REQUIP) tablet 0 25 mg, 0 25 mg, Oral, BID, [facility]""Ofe, DO, 0 25 mg at 12/25/19 6800    saccharomyces boulardii (FLORASTOR) capsule 250 mg, 250 mg, Oral, BID, [facility], [facility], 250 mg at 12/25/19 5906    sertraline (ZOLOFT) tablet 200 mg, 200 mg, Oral, Daily, [facility]""Ofe, DO, 200 mg at 12/25/19 0857    sevelamer (RENAGEL) tablet 800 mg, 800 mg, Oral, TID With Meals, [facility]""Ofe, DO, 800 mg at 12/25/19 4462    sodium bicarbonate tablet 1,300 mg, 1,300 mg, Oral, BID after meals, Yamil Berg MD, 1,300 mg at 12/24/19 1651    spironolactone (ALDACTONE) tablet 25 mg, 25 mg, Oral, Daily, Evelyn Harper MD, 25 mg at 12/25/19 0857    tacrolimus (PROGRAF) capsule 2 mg, 2 mg, Oral, Daily, Seamus Fu DO, 2 mg at 12/24/19 0953    tacrolimus (PROGRAF) capsule 2 mg, 2 mg, Oral, HS, Yamil Berg MD, 2 mg at 12/24/19 2139    Laboratory Results:  Results from last 7 days   Lab Units 12/25/19  0748 12/24/19  0518 12/23/19  0438 12/22/19  0602 12/21/19  0918 12/19/19  0838 12/18/19  0909   WBC Thousand/uL 6 10  --  7 89  --  8 54 6 59 6 12   HEMOGLOBIN g/dL 7 4*  --  7 4*  --  7 6* 7 7* 7 6*   HEMATOCRIT % 23 8*  --  23 8*  --  24 7* 24 7* 24 9*   PLATELETS Thousands/uL 156  --  158  --  163 172 163   SODIUM mmol/L 142 140 142 141 142 142  --    POTASSIUM mmol/L 4 0 4 0 3 3* 3 4* 3 3* 3 5  --    CHLORIDE mmol/L 114* 115* 117* 119* 119* 117*  --    CO2 mmol/L 20* 18* 20* 17* 15* 18*  --    BUN mg/dL 34* 32* 32* 30* 32* 30*  --    CREATININE mg/dL 2 94* 2 76* 2 61* 2 52* 2 70* 2 56*  --    CALCIUM mg/dL 8 3 8 1* 8 1* 8 3 8 0* 8 1*  --    MAGNESIUM mg/dL 2 6 2 7*  --   --  2 4  --   --    PHOSPHORUS mg/dL 5 0* 4 4  --   --  5 0*  --   --        VAS lower limb venous duplex study, complete bilateral   Final Result by Lalo Montanez MD (12/24 2053)      XR abdomen obstruction series   Final Result by Bolivar Hernadez MD (12/18 9847)   1  Increased colorectal stool without obstruction or free air  2  Cardiomegaly  Workstation performed: [id number]         CT abdomen pelvis wo contrast   Final Result by Yue Jara MD (12/10 4730)      There is bowel wall thickening involving the proximal to mid sigmoid and the distal left colon  This may be due to colitis  Clinical and laboratory correlation is recommended  Follow-up after treatment is recommended  There is a small amount of ascites, increased slightly compared to the prior study  There are small bilateral pleural effusions, larger compared to the prior study  There is a pericardial effusion, this appears similar to the prior study  There is    some infiltration of the subcutaneous fat suggesting a degree of anasarca  The native kidneys are markedly atrophic bilaterally  A small right renal cyst appears unchanged  A transplant kidney is again evident within the left iliac fossa  There is some nonspecific perinephric stranding adjacent to the transplant kidney,    similar to the previous examination  There is no hydronephrosis  A urinary bladder diverticulum containing calculi and possibly surgical sutures appears similar to the prior study  Workstation performed: [id number]             Portions of the record may have been created with voice recognition software  Occasional wrong word or "sound a like" substitutions may have occurred due to the inherent limitations of voice recognition software  Read the chart carefully and recognize, using context, where substitutions have occurred

## 2019-12-25 NOTE — ASSESSMENT & PLAN NOTE
· Appears acute on chronic  · S/P kidney and pancreatic transplant in 1998  · continue p o  and trend BMP  · Patient with an RTA related to her pancreatic transplant per Nephrology  · P o  Bicarb supplementation as per Nephrology, appreciate input

## 2019-12-25 NOTE — ASSESSMENT & PLAN NOTE
Lab Results   Component Value Date    HGBA1C 5 1 09/09/2019       Recent Labs     12/24/19  1619 12/24/19  2123 12/25/19  0631 12/25/19  1050   POCGLU 117 101 96 116       Blood Sugar Average: Last 72 hrs:  · (P) 290 [id number] stopped Lantus (in place of Toujeo) 5 units qHS due to hypoglycemia  · Cont SSI with accu-cheks and carb controlled diet  · Initiate hypoglycemia protocol and avoid hypoglycemia

## 2019-12-26 ENCOUNTER — APPOINTMENT (INPATIENT)
Dept: NON INVASIVE DIAGNOSTICS | Facility: HOSPITAL | Age: 54
DRG: 391 | End: 2019-12-26
Payer: MEDICARE

## 2019-12-26 LAB
ANION GAP SERPL CALCULATED.3IONS-SCNC: 7 MMOL/L (ref 4–13)
BUN SERPL-MCNC: 40 MG/DL (ref 5–25)
CALCIUM SERPL-MCNC: 8.2 MG/DL (ref 8.3–10.1)
CHLORIDE SERPL-SCNC: 113 MMOL/L (ref 100–108)
CO2 SERPL-SCNC: 21 MMOL/L (ref 21–32)
CREAT SERPL-MCNC: 2.87 MG/DL (ref 0.6–1.3)
CREAT UR-MCNC: 46.9 MG/DL
ERYTHROCYTE [DISTWIDTH] IN BLOOD BY AUTOMATED COUNT: 17.6 % (ref 11.6–15.1)
GFR SERPL CREATININE-BSD FRML MDRD: 18 ML/MIN/1.73SQ M
GLUCOSE SERPL-MCNC: 104 MG/DL (ref 65–140)
GLUCOSE SERPL-MCNC: 107 MG/DL (ref 65–140)
GLUCOSE SERPL-MCNC: 125 MG/DL (ref 65–140)
GLUCOSE SERPL-MCNC: 125 MG/DL (ref 65–140)
GLUCOSE SERPL-MCNC: 129 MG/DL (ref 65–140)
HCT VFR BLD AUTO: 23 % (ref 34.8–46.1)
HGB BLD-MCNC: 7.2 G/DL (ref 11.5–15.4)
MCH RBC QN AUTO: 32.3 PG (ref 26.8–34.3)
MCHC RBC AUTO-ENTMCNC: 31.3 G/DL (ref 31.4–37.4)
MCV RBC AUTO: 103 FL (ref 82–98)
NT-PROBNP SERPL-MCNC: ABNORMAL PG/ML
PLATELET # BLD AUTO: 157 THOUSANDS/UL (ref 149–390)
PMV BLD AUTO: 12.7 FL (ref 8.9–12.7)
POTASSIUM SERPL-SCNC: 3.8 MMOL/L (ref 3.5–5.3)
PROT UR-MCNC: 76 MG/DL
PROT/CREAT UR: 1.62 MG/G{CREAT} (ref 0–0.1)
RBC # BLD AUTO: 2.23 MILLION/UL (ref 3.81–5.12)
SODIUM SERPL-SCNC: 141 MMOL/L (ref 136–145)
WBC # BLD AUTO: 7.76 THOUSAND/UL (ref 4.31–10.16)

## 2019-12-26 PROCEDURE — 99233 SBSQ HOSP IP/OBS HIGH 50: CPT | Performed by: INTERNAL MEDICINE

## 2019-12-26 PROCEDURE — 82570 ASSAY OF URINE CREATININE: CPT | Performed by: NURSE PRACTITIONER

## 2019-12-26 PROCEDURE — 93306 TTE W/DOPPLER COMPLETE: CPT

## 2019-12-26 PROCEDURE — 85027 COMPLETE CBC AUTOMATED: CPT | Performed by: INTERNAL MEDICINE

## 2019-12-26 PROCEDURE — 93306 TTE W/DOPPLER COMPLETE: CPT | Performed by: INTERNAL MEDICINE

## 2019-12-26 PROCEDURE — 99223 1ST HOSP IP/OBS HIGH 75: CPT | Performed by: INTERNAL MEDICINE

## 2019-12-26 PROCEDURE — 99232 SBSQ HOSP IP/OBS MODERATE 35: CPT | Performed by: INTERNAL MEDICINE

## 2019-12-26 PROCEDURE — 84156 ASSAY OF PROTEIN URINE: CPT | Performed by: NURSE PRACTITIONER

## 2019-12-26 PROCEDURE — 83880 ASSAY OF NATRIURETIC PEPTIDE: CPT | Performed by: NURSE PRACTITIONER

## 2019-12-26 PROCEDURE — 82948 REAGENT STRIP/BLOOD GLUCOSE: CPT

## 2019-12-26 PROCEDURE — 80048 BASIC METABOLIC PNL TOTAL CA: CPT | Performed by: INTERNAL MEDICINE

## 2019-12-26 RX ORDER — DICYCLOMINE HYDROCHLORIDE 10 MG/1
20 CAPSULE ORAL
Status: DISCONTINUED | OUTPATIENT
Start: 2019-12-26 | End: 2019-12-26

## 2019-12-26 RX ORDER — AMLODIPINE BESYLATE 5 MG/1
5 TABLET ORAL DAILY
Status: DISCONTINUED | OUTPATIENT
Start: 2019-12-27 | End: 2019-12-30

## 2019-12-26 RX ORDER — ALBUMIN (HUMAN) 12.5 G/50ML
12.5 SOLUTION INTRAVENOUS ONCE
Status: COMPLETED | OUTPATIENT
Start: 2019-12-26 | End: 2019-12-26

## 2019-12-26 RX ORDER — POLYETHYLENE GLYCOL 3350 17 G/17G
238 POWDER, FOR SOLUTION ORAL ONCE
Status: COMPLETED | OUTPATIENT
Start: 2019-12-26 | End: 2019-12-26

## 2019-12-26 RX ORDER — SEVELAMER HYDROCHLORIDE 800 MG/1
1600 TABLET, FILM COATED ORAL
Status: DISCONTINUED | OUTPATIENT
Start: 2019-12-26 | End: 2020-01-02 | Stop reason: HOSPADM

## 2019-12-26 RX ORDER — FUROSEMIDE 10 MG/ML
40 INJECTION INTRAMUSCULAR; INTRAVENOUS ONCE
Status: COMPLETED | OUTPATIENT
Start: 2019-12-26 | End: 2019-12-26

## 2019-12-26 RX ADMIN — SERTRALINE HYDROCHLORIDE 200 MG: 100 TABLET ORAL at 08:25

## 2019-12-26 RX ADMIN — HEPARIN SODIUM 5000 UNITS: 5000 INJECTION INTRAVENOUS; SUBCUTANEOUS at 22:01

## 2019-12-26 RX ADMIN — ASPIRIN 81 MG 81 MG: 81 TABLET ORAL at 08:23

## 2019-12-26 RX ADMIN — POLYETHYLENE GLYCOL 3350 238 G: 17 POWDER, FOR SOLUTION ORAL at 17:35

## 2019-12-26 RX ADMIN — SODIUM BICARBONATE 650 MG TABLET 1300 MG: at 17:34

## 2019-12-26 RX ADMIN — Medication 250 MG: at 17:34

## 2019-12-26 RX ADMIN — LEVOTHYROXINE SODIUM 125 MCG: 125 TABLET ORAL at 06:13

## 2019-12-26 RX ADMIN — HEPARIN SODIUM 5000 UNITS: 5000 INJECTION INTRAVENOUS; SUBCUTANEOUS at 14:17

## 2019-12-26 RX ADMIN — METOPROLOL TARTRATE 50 MG: 50 TABLET, FILM COATED ORAL at 22:03

## 2019-12-26 RX ADMIN — SPIRONOLACTONE 25 MG: 25 TABLET ORAL at 08:25

## 2019-12-26 RX ADMIN — METOPROLOL TARTRATE 50 MG: 50 TABLET, FILM COATED ORAL at 08:23

## 2019-12-26 RX ADMIN — BUSPIRONE HYDROCHLORIDE 5 MG: 5 TABLET ORAL at 08:22

## 2019-12-26 RX ADMIN — ROPINIROLE 0.25 MG: 0.25 TABLET, FILM COATED ORAL at 08:23

## 2019-12-26 RX ADMIN — HYDRALAZINE HYDROCHLORIDE 50 MG: 50 TABLET, FILM COATED ORAL at 06:16

## 2019-12-26 RX ADMIN — CLONIDINE HYDROCHLORIDE 0.2 MG: 0.1 TABLET ORAL at 22:03

## 2019-12-26 RX ADMIN — HYDRALAZINE HYDROCHLORIDE 50 MG: 50 TABLET, FILM COATED ORAL at 22:03

## 2019-12-26 RX ADMIN — SEVELAMER HYDROCHLORIDE 1600 MG: 800 TABLET, FILM COATED PARENTERAL at 17:34

## 2019-12-26 RX ADMIN — DOXAZOSIN 2 MG: 2 TABLET ORAL at 22:02

## 2019-12-26 RX ADMIN — TACROLIMUS 2 MG: 1 CAPSULE ORAL at 22:02

## 2019-12-26 RX ADMIN — Medication 250 MG: at 08:24

## 2019-12-26 RX ADMIN — TACROLIMUS 2 MG: 1 CAPSULE ORAL at 08:26

## 2019-12-26 RX ADMIN — MELATONIN 3000 UNITS: at 08:22

## 2019-12-26 RX ADMIN — PREDNISONE 5 MG: 5 TABLET ORAL at 08:25

## 2019-12-26 RX ADMIN — HYDRALAZINE HYDROCHLORIDE 50 MG: 50 TABLET, FILM COATED ORAL at 14:19

## 2019-12-26 RX ADMIN — ALBUMIN (HUMAN) 12.5 G: 25 SOLUTION INTRAVENOUS at 13:08

## 2019-12-26 RX ADMIN — ARIPIPRAZOLE 30 MG: 10 TABLET ORAL at 22:03

## 2019-12-26 RX ADMIN — DICYCLOMINE HYDROCHLORIDE 20 MG: 10 CAPSULE ORAL at 08:35

## 2019-12-26 RX ADMIN — SEVELAMER HYDROCHLORIDE 1600 MG: 800 TABLET, FILM COATED PARENTERAL at 11:41

## 2019-12-26 RX ADMIN — AMLODIPINE BESYLATE 10 MG: 10 TABLET ORAL at 08:23

## 2019-12-26 RX ADMIN — CLONIDINE HYDROCHLORIDE 0.2 MG: 0.1 TABLET ORAL at 06:16

## 2019-12-26 RX ADMIN — BUSPIRONE HYDROCHLORIDE 5 MG: 5 TABLET ORAL at 17:34

## 2019-12-26 RX ADMIN — ROPINIROLE 0.25 MG: 0.25 TABLET, FILM COATED ORAL at 17:34

## 2019-12-26 RX ADMIN — CLONIDINE HYDROCHLORIDE 0.2 MG: 0.1 TABLET ORAL at 14:18

## 2019-12-26 RX ADMIN — SODIUM BICARBONATE 650 MG TABLET 1300 MG: at 08:21

## 2019-12-26 RX ADMIN — FERROUS SULFATE TAB 325 MG (65 MG ELEMENTAL FE) 325 MG: 325 (65 FE) TAB at 08:22

## 2019-12-26 RX ADMIN — PRAVASTATIN SODIUM 80 MG: 80 TABLET ORAL at 08:24

## 2019-12-26 RX ADMIN — FOLIC ACID 1000 MCG: 1 TABLET ORAL at 08:25

## 2019-12-26 RX ADMIN — HEPARIN SODIUM 5000 UNITS: 5000 INJECTION INTRAVENOUS; SUBCUTANEOUS at 06:13

## 2019-12-26 RX ADMIN — LOPERAMIDE HYDROCHLORIDE 2 MG: 2 CAPSULE ORAL at 08:35

## 2019-12-26 RX ADMIN — FUROSEMIDE 40 MG: 10 INJECTION, SOLUTION INTRAMUSCULAR; INTRAVENOUS at 14:18

## 2019-12-26 RX ADMIN — DULOXETINE HYDROCHLORIDE 60 MG: 60 CAPSULE, DELAYED RELEASE ORAL at 17:34

## 2019-12-26 RX ADMIN — DULOXETINE HYDROCHLORIDE 60 MG: 60 CAPSULE, DELAYED RELEASE ORAL at 08:21

## 2019-12-26 RX ADMIN — SEVELAMER HYDROCHLORIDE 800 MG: 800 TABLET, FILM COATED PARENTERAL at 08:21

## 2019-12-26 NOTE — ASSESSMENT & PLAN NOTE
Lab Results   Component Value Date    HGBA1C 5 1 09/09/2019       Recent Labs     12/25/19  1050 12/25/19  1625 12/25/19  2102 12/26/19  0700   POCGLU 116 158* 102 107       Blood Sugar Average: Last 72 hrs:  · (P) 586 [id number] stopped Lantus (in place of Toujeo) 5 units qHS due to hypoglycemia  · Cont SSI with accu-cheks and carb controlled diet  · Initiate hypoglycemia protocol and avoid hypoglycemia  · Repeat A1c

## 2019-12-26 NOTE — ASSESSMENT & PLAN NOTE
Working diagnosis was a viral enterocolitis, with residual irritable bowel  Patient reports 10 episodes of watery mucousy diarrhea past 24 hours  Reports abdominal cramping before diarrhea, abdomen bloating and discomfort  Patient is too far out of her transplant to be in the window for any immune mediated process  Patient did find some relief with loperamide and increased Bentyl 20 mg  Will change Bentyl to a c  And HS, continue p r n  Imodium  Stool culture, C diff negative

## 2019-12-26 NOTE — PROGRESS NOTES
NEPHROLOGY PROGRESS NOTE   Keaton Sheikh 53 y o  female MRN: [id number]  Unit/Bed#: -01 Encounter: [id number]      ASSESSMENT/PLAN:  Chronic kidney disease, stage IV:  Status post kidney and pancreas transplant   - upon review of medical records, baseline creatinine approximately in the mid 2s  - patient follows in our office as an outpatient  - continue current immunosuppressive medications  Acute kidney injury:  Suspect secondary to volume depletion  - patient with peak creatinine of 3 39   - most recent creatinine decreasing to 2 87 today  - monitor renal indices closely in the setting of multiple episodes of diarrhea  - check PVR  - monitor volume status closely with strict intake/output  - daily standing weight  - avoid NSAIDs, nephrotoxic agents, hypotension, IV contrast   - trend daily BMP  Hypertension:  Blood pressure overall acceptable, however with hypertensive episode yesterday evening   - continue current regimen  However, may need to decrease amlodipine due to lower extremity edema  - avoid hypotension/fluctuations of blood pressure   - maintain hold parameters on antihypertensives  Electrolytes:  - stable today  - continue sodium bicarb supplements  - continue to monitor daily with BMP  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:  - most recent phosphorus 5 0 yesterday   - Renagel increased to 1600 mg TID  - maintain low phosphorus diet  - most recent magnesium stable at 2 6  Anemia of chronic kidney disease:  - most recent hemoglobin 7 2 today  - Iron Sat 28%, TIBC 190, Iron 54  - patient with history of multiple myeloma and follows with Hematology  Leg edema:  - patient without any respiratory distress  - torsemide given once on 12/24   - IV albumin 12 5 g followed by IV Lasix 40 mg once given yesterday  - venous Doppler revealed no evidence of DVT  - maintain compression stockings as tolerated    - encourage patient to keep her legs elevated  Diarrhea:  - patient with 10 episodes of diarrhea yesterday evening and this a m   - monitor volume status closely  - GI consulted  SUBJECTIVE:  Patient resting in the chair  Patient states that she has had several episodes of diarrhea last night and this a m  Patient denies shortness of breath, chest pain, vomiting      OBJECTIVE:  Current Weight: Weight - Scale: 109 kg (239 lb 10 2 oz)  Vitals:    12/26/19 0743   BP: 145/50   Pulse: 58   Resp: 16   Temp: 98 5 °F (36 9 °C)   SpO2: 95%       Intake/Output Summary (Last 24 hours) at 12/26/2019 1040  Last data filed at 12/26/2019 0730  Gross per 24 hour   Intake 1800 ml   Output 3525 ml   Net -1725 ml       General:  No current acute distress, however patient reported several episodes of diarrhea  Skin:  Warm, no rash  Eyes:  Sclerae anicteric  ENT:  Moist lips and mucous membranes  Neck:  Supple JVD, trachea midline  Chest:  Clear breath sounds bilaterally   CVS:  Normal rate and rhythm, no rubs or gallops appreciated  Abdomen:  Soft, tender, normoactive bowel sounds  Extremities:  Bilateral lower extremity edema   Neuro:  Alert and oriented  Psych:  Appropriate affect      Medications:  Scheduled Meds:  Current Facility-Administered Medications:  acetaminophen 650 mg Oral Q6H PRN Kourtney Ortiz PA-C   aluminum-magnesium hydroxide-simethicone 30 mL Oral Q4H PRN Kourtney Ortiz PA-C   amLODIPine 10 mg Oral Daily Katiana Bain MD   ARIPiprazole 30 mg Oral HS Jetty Jubilee, DO   aspirin 81 mg Oral Daily Jetty Jubilee, DO   busPIRone 5 mg Oral BID Jetty Jubilee, DO   cholecalciferol 3,000 Units Oral Daily Jetty Jubilee, DO   cloNIDine 0 2 mg Oral [facility] Katiana Bain MD   dicyclomine 20 mg Oral 4x Daily (AC & HS) MER Guadalupe   doxazosin 2 mg Oral HS Anant Taylor MD   DULoxetine 60 mg Oral BID Jetty Jubilee, DO   ferrous sulfate 325 mg Oral Daily With Breakfast Jetty Jubilee, DO   folic acid 6,441 mcg Oral Daily Jetty Jubilee, DO heparin (porcine) 5,000 Units Subcutaneous [facility] Teresoitri Landing, DO   hydrALAZINE 5 mg Intravenous Q6H PRN Donavan Banuelos MD   hydrALAZINE 50 mg Oral Q8H [facility] Eda Chatman MD   insulin lispro 1-5 Units Subcutaneous HS Tereso Landing, DO   insulin lispro 1-6 Units Subcutaneous TID AC Donavan Banuelos MD   levothyroxine 125 mcg Oral Early Morning Dimitri Landing, DO   loperamide 2 mg Oral TID PRN MER Guadalupe   LORazepam 2 mg Oral TID PRN Dimitri Landing, DO   metoprolol tartrate 50 mg Oral Q12H [facility]   ondansetron 4 mg Intravenous Q4H PRN Maru Diaz MD   pravastatin 80 mg Oral Daily Dimitri Landing, DO   predniSONE 5 mg Oral Daily Dimitri Landing, DO   rOPINIRole 0 25 mg Oral BID Dimitri Landing, DO   saccharomyces boulardii 250 mg Oral BID Dimitri Landing, DO   sertraline 200 mg Oral Daily Dimitri Landing, DO   sevelamer 800 mg Oral TID With Meals Dimitri Landing, DO   sodium bicarbonate 1,300 mg Oral BID after meals Cesar Spain MD   spironolactone 25 mg Oral Daily Selena Green MD   tacrolimus 2 mg Oral Daily Yoel Daub, DO   tacrolimus 2 mg Oral HS Cesar Spain MD       PRN Meds:   acetaminophen    aluminum-magnesium hydroxide-simethicone    hydrALAZINE    loperamide    LORazepam    ondansetron    Continuous Infusions:     Laboratory Results:  Results from last 7 days   Lab Units 12/26/19  0428 12/26/19  0333 12/25/19  0748 12/24/19  0518 12/23/19  0438 12/22/19  0602 12/21/19  0918   WBC Thousand/uL  --  7 76 6 10  --  7 89  --  8 54   HEMOGLOBIN g/dL  --  7 2* 7 4*  --  7 4*  --  7 6*   HEMATOCRIT %  --  23 0* 23 8*  --  23 8*  --  24 7*   PLATELETS Thousands/uL  --  157 156  --  158  --  163   SODIUM mmol/L 141  --  142 140 142 141 142   POTASSIUM mmol/L 3 8  --  4 0 4 0 3 3* 3 4* 3 3*   CHLORIDE mmol/L 113*  --  114* 115* 117* 119* 119*   CO2 mmol/L 21  --  20* 18* 20* 17* 15*   BUN mg/dL 40*  --  34* 32* 32* 30* 32*   CREATININE mg/dL 2 87*  --  2 94* 2 76* 2 61* 2 52* 2 70*   CALCIUM mg/dL 8 2*  --  8 3 8 1* 8 1* 8 3 8 0*   MAGNESIUM mg/dL  --   --  2 6 2 7*  --   --  2 4   PHOSPHORUS mg/dL  --   --  5 0* 4 4  --   --  5 0*

## 2019-12-26 NOTE — ASSESSMENT & PLAN NOTE
Nontender lower extremities  Venous Doppler negative for DVT  Received 1 dose of torsemide on 12/24 per Nephrology  Received albumin plus IV Lasix yesterday per Nephrology  History of diastolic CHF  Check proBNP  Daily weight and I&Os  Appreciate Nephrology input

## 2019-12-26 NOTE — CONSULTS
Consultation - [facility]  Jeanine Domingo 53 y o  female MRN: [id number]  Unit/Bed#: -01 Encounter: [id number]              Inpatient consult to gastroenterology     Performed by  Shyanne Barr MD     Authorized by MER Temple              Reason for Consult / Principal Problem:     Diarrhea  Colitis  Abdominal pain      ASSESSMENT AND PLAN:      Diarrhea  Colitis  Abdominal pain  Constipation  -C diff is negative, stool culture negative, stool leukocytes negative, TSH normal, denies signs of overt GI bleeding or unintentional weight loss  -in a patient with chronic constipation with large amount of stool burden on imaging, I suspect that she is having overflow diarrhea  -her localized inflammation may be secondary to fecal stasis  -other differentials include side effect of Revlimid however she has not received this for the past 2 weeks and still has persistent symptoms and she denied having symptoms prior to hospitalization; IBS(possibly postinfectious given her recent history of viral illness), gastroenteritis, large polyps, malignancy, IBD, microscopic colitis, pancreatic exocrine insufficiency, antibiotic associated diarrhea  -I suspect as her constipation and stool burden improves her abdominal pain and nausea will also improve  -last colonoscopy 2013 by Dr Olya Puente which revealed 2 subcentimeter rectal polyps(unknown pathology)  -last EGD 11/2017 which showed mild esophagitis and gastritis-negative esophageal, gastric and duodenal biopsies  -recommend aggressive bowel regimen, stop loperamide, stop Bentyl  -recommend outpatient colonoscopy    ______________________________________________________________________    HPI:  42-year-old female seen in consultation for diarrhea abdominal pain    She has significant past medical history including status post kidney and pancreas transplant in 1998 on chronic immunosuppression with tacrolimus and low-dose prednisone, multiple myeloma on Revlimid, hypothyroidism, type 1 diabetes, anemia of chronic disease, CHF, status post cholecystectomy, depression/anxiety, CKD, and chronic constipation  Patient initially admitted to [facility] on 11/23 with complaints of fever, elevated blood pressure , foul-smelling urine nausea and abdominal pain and found to have acute cystitis secondary to VRE infection with bacteremia and complains of diarrhea  Imaging at that time showed circumferential mural thickening of the sigmoid colon suspicious for colitis however C diff was negative  Her diarrhea at that time was attributed to stool softeners and laxatives  She was ultimately discharged on 12/6  Patient returns [facility] ED [facility] on 12/09 with complaints of abdominal pain, nausea, diarrhea, lethargy/confusion  Patient was bacteremic and treated with Flagyl x5 days with repeat blood cultures negative for suspected colitis  Patient continues to have symptoms of 8 small loose/brown/mucous bowel movements per day  This is associated with abdominal pain and decreased appetite but no vomiting or overt bleeding  Last colonoscopy due in 2013 which revealed 2 subcentimeter rectal polyps  Last EGD in 2017 with mild esophagitis and gastritis with negative biopsies  Patient denies heartburn, reflux, difficulty swallowing, blood or melena in stool, unintentional weight loss  She reports that she does have lower abdominal cramping which is slightly worsened with eating but improved with having bowel movement  REVIEW OF SYSTEMS:    CONSTITUTIONAL: Denies any fever, chills, rigors, and weight loss  HEENT: No earache or tinnitus  Denies hearing loss or visual disturbances  CARDIOVASCULAR: No chest pain or palpitations  RESPIRATORY: Denies any cough, hemoptysis, shortness of breath or dyspnea on exertion  GASTROINTESTINAL: As noted in the History of Present Illness  GENITOURINARY: No problems with urination   Denies any hematuria or dysuria  NEUROLOGIC: No dizziness or vertigo, denies headaches  MUSCULOSKELETAL: Denies any muscle or joint pain  SKIN: Denies skin rashes or itching  ENDOCRINE: Denies excessive thirst  Denies intolerance to heat or cold  PSYCHOSOCIAL: Denies depression or anxiety  Denies any recent memory loss  Historical Information   Past Medical History:   Diagnosis Date    Abnormal liver function test     Acute kidney injury ([facility] [place] [address] [facility]     Acute on chronic congestive heart failure (HCC)     Allergic urticaria     Anemia     Cancer (HCC)     Multiple myeloma    Cervical dysplasia     Cholelithiasis     Chronic diastolic (congestive) heart failure ([facility] [place] [address] ) 9/18/2017    [facility]     Previous, controlled with diet    Diabetes mellitus with foot ulcer ([facility] [place] [address] )     Disease of thyroid gland     Encephalopathy     Hematuria     + leak est- secondary to UTIs/panc drainage    History of transfusion     Hyperkalemia     Hypertension     Iliotibial band syndrome     Lumbar radiculopathy     Multiple myeloma (HCC)     Multiple myeloma ([facility] [place] [address] [facility]     Night blindness     Nonrheumatic aortic (valve) insufficiency     Pneumonia     Renal disorder     Retinopathy     Seborrhea     Seizure [facility]     Shingles     Sinus tachycardia     B blocker - cardio echo stress test 02 normal/neg LE doppler 2/02 OK and 12/07    Status post simultaneous kidney and pancreas transplant ([facility] [place] [address] )     Toe amputation status     Trochanteric bursitis      Past Surgical History:   Procedure Laterality Date    CATARACT EXTRACTION      CHOLECYSTECTOMY      COLONOSCOPY      two polyps in the rectum removed and biopsied diverticulosis in the sigmoid colon, external hemorrhoiods- Dr Barfield    COMBINED KIDNEY-PANCREAS TRANSPLANT N/A     CT BONE MARROW BIOPSY AND ASPIRATION  4/17/2019    CYSTOSCOPY N/A 10/13/2016    Procedure: CYSTOSCOPY, retrograde pyelogram, biopsy of ureteral polyp;   Surgeon: Osmel Mckay MD;  Location: [facility];  Service:     DILATION AND CURETTAGE OF UTERUS      ESOPHAGOGASTRODUODENOSCOPY N/A 2017    Procedure: ESOPHAGOGASTRODUODENOSCOPY (EGD); Surgeon: Dionte Paredes DO;  Location: [facility];   Service: Gastroenterology    EYE SURGERY      cataracts    FOOT AMPUTATION THROUGH METATARSAL Left     FOOT SURGERY Right     excision of metatarsal heads    HALLUX VALGUS CORRECTION Right     NEPHRECTOMY TRANSPLANTED ORGAN      PANCREATIC TRANSPLANT REMOVAL       Social History   Social History     Substance and Sexual Activity   Alcohol Use Never    Frequency: Never    Binge frequency: Never    Comment: (history)     Social History     Substance and Sexual Activity   Drug Use Never    Types: Hydrocodone    Comment: Past cocaine use many years ago - no current use     Social History     Tobacco Use   Smoking Status Former Smoker    Last attempt to quit: 2012    Years since quittin 6   Smokeless Tobacco Never Used     Family History   Problem Relation Age of Onset    Hypertension Mother     Cancer Mother     Hypertension Father     Cancer Father     Cancer Maternal Grandfather     Cancer Paternal Grandmother     Cancer Paternal Grandfather     Depression Sister     Breast cancer Maternal Grandmother 66       Meds/Allergies     Medications Prior to Admission   Medication    amLODIPine (NORVASC) 10 mg tablet    ARIPiprazole (ABILIFY) 30 mg tablet    aspirin 81 MG tablet    busPIRone (BUSPAR) 5 mg tablet    Cholecalciferol (VITAMIN D3) 1000 units CAPS    cloNIDine (CATAPRES) 0 1 mg tablet    doxazosin (CARDURA) 4 mg tablet    DULoxetine (CYMBALTA) 60 mg delayed release capsule    ferrous sulfate 325 (65 Fe) mg tablet    folic acid (FOLVITE) 1 mg tablet    hydrALAZINE (APRESOLINE) 100 MG tablet    Insulin Glargine (TOUJEO) 300 units/mL CONCETRATED U-300 injection pen    Insulin Pen Needle (BD PEN NEEDLE VISHNU U/F) 32G X 4 MM MISC    Lancets (ONETOUCH ULTRASOFT) lancets  lenalidomide (REVLIMID) 5 MG CAPS    levothyroxine 125 mcg tablet    loperamide (IMODIUM) 2 mg capsule    LORazepam (ATIVAN) 2 mg tablet    metoprolol tartrate (LOPRESSOR) 50 mg tablet    pravastatin (PRAVACHOL) 80 mg tablet    predniSONE 5 mg tablet    rOPINIRole (REQUIP) 0 25 mg tablet    saccharomyces boulardii (FLORASTOR) 250 mg capsule    sertraline (ZOLOFT) 100 mg tablet    sevelamer carbonate (RENVELA) 800 mg tablet    sodium bicarbonate 650 mg tablet    tacrolimus (PROGRAF) 1 mg capsule     Current Facility-Administered Medications   Medication Dose Route Frequency    acetaminophen (TYLENOL) tablet 650 mg  650 mg Oral Q6H PRN    albumin human (FLEXBUMIN) 25 % injection 12 5 g  12 5 g Intravenous Once    aluminum-magnesium hydroxide-simethicone (MYLANTA) 200-200-20 mg/5 mL oral suspension 30 mL  30 mL Oral Q4H PRN    [START ON 12/27/2019] amLODIPine (NORVASC) tablet 5 mg  5 mg Oral Daily    ARIPiprazole (ABILIFY) tablet 30 mg  30 mg Oral HS    aspirin chewable tablet 81 mg  81 mg Oral Daily    busPIRone (BUSPAR) tablet 5 mg  5 mg Oral BID    cholecalciferol (VITAMIN D3) tablet 3,000 Units  3,000 Units Oral Daily    cloNIDine (CATAPRES) tablet 0 2 mg  0 2 mg Oral Q8H [address]    dicyclomine (BENTYL) capsule 20 mg  20 mg Oral 4x Daily (AC & HS)    doxazosin (CARDURA) tablet 2 mg  2 mg Oral HS    DULoxetine (CYMBALTA) delayed release capsule 60 mg  60 mg Oral BID    ferrous sulfate tablet 325 mg  325 mg Oral Daily With Breakfast    folic acid (FOLVITE) tablet 1,000 mcg  1,000 mcg Oral Daily    furosemide (LASIX) injection 40 mg  40 mg Intravenous Once    heparin (porcine) subcutaneous injection 5,000 Units  5,000 Units Subcutaneous Q8H [address]    hydrALAZINE (APRESOLINE) injection 5 mg  5 mg Intravenous Q6H PRN    hydrALAZINE (APRESOLINE) tablet 50 mg  50 mg Oral Q8H [address]    insulin lispro (HumaLOG) 100 units/mL subcutaneous injection 1-5 Units  1-5 Units Subcutaneous HS    insulin lispro (HumaLOG) 100 units/mL subcutaneous injection 1-6 Units  1-6 Units Subcutaneous TID AC    levothyroxine tablet 125 mcg  125 mcg Oral Early Morning    loperamide (IMODIUM) capsule 2 mg  2 mg Oral TID PRN    LORazepam (ATIVAN) tablet 2 mg  2 mg Oral TID PRN    metoprolol tartrate (LOPRESSOR) tablet 50 mg  50 mg Oral Q12H [address]    ondansetron (ZOFRAN) injection 4 mg  4 mg Intravenous Q4H PRN    pravastatin (PRAVACHOL) tablet 80 mg  80 mg Oral Daily    predniSONE tablet 5 mg  5 mg Oral Daily    rOPINIRole (REQUIP) tablet 0 25 mg  0 25 mg Oral BID    saccharomyces boulardii (FLORASTOR) capsule 250 mg  250 mg Oral BID    sertraline (ZOLOFT) tablet 200 mg  200 mg Oral Daily    sevelamer (RENAGEL) tablet 1,600 mg  1,600 mg Oral TID With Meals    sodium bicarbonate tablet 1,300 mg  1,300 mg Oral BID after meals    spironolactone (ALDACTONE) tablet 25 mg  25 mg Oral Daily    tacrolimus (PROGRAF) capsule 2 mg  2 mg Oral Daily    tacrolimus (PROGRAF) capsule 2 mg  2 mg Oral HS       Allergies   Allergen Reactions    Ixazomib Rash     Ninlaro    Revlimid [Lenalidomide] Rash    Cefadroxil      Tolerated cefepime 2019    Latex Rash    Morphine Other (See Comments)     Other reaction(s): Other (See Comments)  projectile vomiting    Morphine And Related GI Intolerance    Myrbetriq [Mirabegron] Hives    Penicillins Hives     Other reaction(s): Other (See Comments)  Respiratory Distress,hives  Tolerates cefazolin           Objective     Blood pressure 145/50, pulse 58, temperature 98 5 °F (36 9 °C), temperature source Oral, resp  rate 16, height 5' 8" (1 727 m), weight 109 kg (239 lb 10 2 oz), SpO2 95 %  Body mass index is 36 44 kg/m²        Intake/Output Summary (Last 24 hours) at 12/26/2019 1308  Last data filed at 12/26/2019 0730  Gross per 24 hour   Intake 1440 ml   Output 2875 ml   Net -1435 ml         PHYSICAL EXAM:      General Appearance:   Alert, cooperative, no distress   HEENT:   Normocephalic, atraumatic, anicteric      Neck:  Supple, symmetrical, trachea midline   Lungs:   Clear to auscultation bilaterally; no rales, rhonchi or wheezing; respirations unlabored    Heart[de-identified]   Regular rate and rhythm; no murmur, rub, or gallop  Abdomen:   Soft, non-tender, softly-distended; normal bowel sounds; no masses, no organomegaly    Genitalia:   Deferred    Rectal:   Deferred    Extremities:  + edema        Skin:  No jaundice, rashes, or lesions    Lymph nodes:  No palpable cervical lymphadenopathy        Lab Results:   No results displayed because visit has over 200 results  Imaging Studies: I have personally reviewed pertinent imaging studies

## 2019-12-26 NOTE — PROGRESS NOTES
Progress Note - Keaton Sheikh 1965, 53 y o  female MRN: [id number]    Unit/Bed#: -01 Encounter: [id number]    Primary Care Provider: Bianka Mattson MD   Date and time admitted to hospital: 12/9/2019  8:29 PM        * Enterocolitis  Assessment & Plan  · With history of recurrent UTIs in setting of renal and pancreatic transplant  Most recently with VRE at recent admission to [facility]  Presenting with abdominal pain/nausea and lethargy/confusion  · CT abdomen/pelvis showing colitis  · Stool cdiff neg x2, enteric panel neg, leucocytes neg  · Checked CMV PCR - negative  · Patient reports 10 episodes of watery mucousy diarrhea past 24 hours, reports abdominal cramping before diarrhea, abdominal bloating and diffuse abdominal discomfort  · Patient afebrile, WBC normal    · Will increase Bentyl to a c  And HS, continue Imodium p r n  Unk Wanda · Change diet to low residual   · Consult GI  Diarrhea  Assessment & Plan  Working diagnosis was a viral enterocolitis, with residual irritable bowel  Patient reports 10 episodes of watery mucousy diarrhea past 24 hours  Reports abdominal cramping before diarrhea, abdomen bloating and discomfort  Patient is too far out of her transplant to be in the window for any immune mediated process  Patient did find some relief with loperamide and increased Bentyl 20 mg  Will change Bentyl to a c  And HS, continue p r n  Imodium  Stool culture, C diff negative  Swelling of lower extremity  Assessment & Plan  Nontender lower extremities  Venous Doppler negative for DVT  Received 1 dose of torsemide on 12/24 per Nephrology  Received albumin plus IV Lasix yesterday per Nephrology  History of diastolic CHF  Check proBNP  Daily weight and I&Os  Appreciate Nephrology input  Bacteremia  Assessment & Plan  · Final cultures Grew Porphyromonas asaccharolytica    ID has been monitoring off antibiotics    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, with post infectious IBS  Patient did not tolerate questran  Team discussed with ID  No additional antibiotics at this time  Team discussed with GI    loperamide and increased Bentyl did improve her symptoms  Will continue this regimen    Asymptomatic bacteriuria  Assessment & Plan  · Recently completed 7 D course of IV daptomycin for VRE UTI  Monitoring off antibiotic therapy  · Has positive cultures for VRE again, but this is not the cause of her symptoms, its colonization  · Monitor off Abx      Metabolic acidosis  Assessment & Plan  · Appears acute on chronic  · Resolved  · S/P kidney and pancreatic transplant in 1998  · continue p o  Sodium bicarb and trend BMP  · Patient with an RTA related to her pancreatic transplant per Nephrology  · P o  Bicarb supplementation as per Nephrology, appreciate input  Multiple myeloma not having achieved remission [facility])  Assessment & Plan  · Follows with Dr Anthony Alcaraz as outpatient  · Continue Revlimid at home dosage  · Hemoglobin stable between 7-8  · Known history of transfusions in past   Has multiple autoantibodies  Essential hypertension  Assessment & Plan  Blood pressures have remained variable  Patient currently on clonidine t i d  , hydralazine Q 8, And Cardura, metoprolol  Norvasc was discontinued, now resumed  Continue to monitor  Added spironolactone  One dose of torsemide on 12/24  Received albumin and Lasix on 12/25  Monitor      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 [facility])  Assessment & Plan  Lab Results   Component Value Date    HGBA1C 5 1 09/09/2019       Recent Labs     12/25/19  1050 12/25/19  1625 12/25/19  2102 12/26/19  0700   POCGLU 116 158* 102 107       Blood Sugar Average: Last 72 hrs:  · (P) 458 [id number] stopped Lantus (in place of Toujeo) 5 units qHS due to hypoglycemia  · Cont SSI with accu-cheks and carb controlled diet  · Initiate hypoglycemia protocol and avoid hypoglycemia  · Repeat A1c    Chronic kidney disease, stage 4 (severe) ([facility])  Assessment & Plan  · Baseline creatinine 2 5-3  · Creatinine stable  · Monitor volume status, not all UO documented  · Monitor BMP while inpatient  · One dose of torsemide on Prandin day 4 and received albumin and Lasix IV yesterday per Nephrology  · Avoid nephrotoxins as able, renally dose medications as indicated  · Appreciate Nephrology input  Now on p o  Bicarb and off bicarb infusion  · Spironolactone added due to hypokalemia  Consider discontinuing spironolactone if creatinine continues to trend up  Renal transplant recipient  Assessment & Plan  · On chronic immunosuppression with tacrolimus and prednisone  · Unfortunately patient noncompliant with all medications since discharge including these  · Tacrolimus level was 4 > 10 > 7 (12/12) > 5 (12/14) remains stable at 4 4  · dose decreased to 2 BID on 12/12, again decrease to 2 mg QAM & 1 5 mg HS from 12/14  · Tacrolimus level was stable on the current dosing  · Last level 4 5        VTE Pharmacologic Prophylaxis:   Pharmacologic: Heparin  Mechanical VTE Prophylaxis in Place: No    Patient Centered Rounds: I have performed bedside rounds with nursing staff today  Discussions with Specialists or Other Care Team Provider: yes    Education and Discussions with Family / Patient: yes    Time Spent for Care: 20 minutes  More than 50% of total time spent on counseling and coordination of care as described above  Current Length of Stay: 16 day(s)    Current Patient Status: Inpatient   Certification Statement: The patient will continue to require additional inpatient hospital stay due to Enterocolitis, diarrhea, leg edema    Discharge Plan:  Short-term rehab once medically cleared    Code Status: Level 1 - Full Code      Subjective:   Patient reports 10 episodes of watery mucousy diarrhea past 24 hours    Reports abdominal cramping prior to diarrhea, reports abdominal bloating and diffuse discomfort, reports having diarrhea right after eating meals  Reports feeling tired and weak  Denies fever, reports feeling cold  Denies chest pain, headaches, dizziness, SOB, nausea, vomiting  Objective:     Vitals:   Temp (24hrs), Av 3 °F (36 8 °C), Min:98 °F (36 7 °C), Max:98 5 °F (36 9 °C)    Temp:  [98 °F (36 7 °C)-98 5 °F (36 9 °C)] 98 5 °F (36 9 °C)  HR:  [58-66] 58  Resp:  [16] 16  BP: (144-177)/(50-65) 145/50  SpO2:  [95 %-96 %] 95 %  Body mass index is 36 44 kg/m²  Input and Output Summary (last 24 hours): Intake/Output Summary (Last 24 hours) at 2019 0954  Last data filed at 2019 0730  Gross per 24 hour   Intake 1800 ml   Output 3525 ml   Net -1725 ml       Physical Exam:     Physical Exam   Constitutional: She is oriented to person, place, and time  She appears well-developed and well-nourished  Patient appears weak  HENT:   Head: Normocephalic and atraumatic  Neck: Normal range of motion  Neck supple  No JVD present  No tracheal deviation present  No thyromegaly present  Cardiovascular: Normal rate and regular rhythm  No murmur heard  Pulmonary/Chest: Effort normal and breath sounds normal  No respiratory distress  She has no wheezes  She has no rales  Abdominal: Soft  Bowel sounds are normal  She exhibits distension  There is tenderness  There is no guarding  Mild abdomen distension, mild diffuse tenderness, no rebound, bruising from heparin injection  Musculoskeletal: She exhibits edema  She exhibits no tenderness or deformity  2+ lower extremity edema  Neurological: She is alert and oriented to person, place, and time  Skin: Skin is warm and dry  Psychiatric: She has a normal mood and affect  Judgment normal    Nursing note and vitals reviewed        Additional Data:     Labs:    Results from last 7 days   Lab Units 19  0333  19  0918   WBC Thousand/uL 7 76   < > 8 54   HEMOGLOBIN g/dL 7 2*   < > 7 6*   HEMATOCRIT % 23 0*   < > 24 7*   PLATELETS Thousands/uL 157   < > 163   NEUTROS PCT %  --   --  71   LYMPHS PCT %  --   --  14   MONOS PCT %  --   --  9   EOS PCT %  --   --  4    < > = values in this interval not displayed  Results from last 7 days   Lab Units 12/26/19  0428  12/21/19  0918   POTASSIUM mmol/L 3 8   < > 3 3*   CHLORIDE mmol/L 113*   < > 119*   CO2 mmol/L 21   < > 15*   BUN mg/dL 40*   < > 32*   CREATININE mg/dL 2 87*   < > 2 70*   CALCIUM mg/dL 8 2*   < > 8 0*   ALK PHOS U/L  --   --  95   ALT U/L  --   --  15   AST U/L  --   --  13    < > = values in this interval not displayed  * I Have Reviewed All Lab Data Listed Above  * Additional Pertinent Lab Tests Reviewed:  Ambrocio Cates Admission Reviewed    Imaging:    Imaging Reports Reviewed Today Include:  Venous Doppler  Imaging Personally Reviewed by Myself Includes:  None    Recent Cultures (last 7 days):           Last 24 Hours Medication List:     Current Facility-Administered Medications:  acetaminophen 650 mg Oral Q6H PRN Kourtney Ortiz PA-C   aluminum-magnesium hydroxide-simethicone 30 mL Oral Q4H PRN Kourtney Ortiz PA-C   amLODIPine 10 mg Oral Daily Shea King MD   ARIPiprazole 30 mg Oral HS Carri Ramos, DO   aspirin 81 mg Oral Daily Carri Ramos, DO   busPIRone 5 mg Oral BID Carri Ramos, DO   cholecalciferol 3,000 Units Oral Daily Carri Ramos, DO   cloNIDine 0 2 mg Oral [facility] Shea King MD   dicyclomine 20 mg Oral 4x Daily (AC & HS) MER Guadalupe   doxazosin 2 mg Oral HS George Merrill MD   DULoxetine 60 mg Oral BID Carri Ramos, DO   ferrous sulfate 325 mg Oral Daily With Breakfast Carri Ramos, DO   folic acid 3,510 mcg Oral Daily Carri Ramos, DO   heparin (porcine) 5,000 Units Subcutaneous Q8H [address] Carri Ramos, DO   hydrALAZINE 5 mg Intravenous Q6H PRN Giuliano Vega MD   hydrALAZINE 50 mg Oral Q8H Germania Albarado MD   insulin lispro 1-5 Units Subcutaneous HS Carri Ramos DO   insulin lispro 1-6 Units Subcutaneous TID AC Carole Garcia MD   levothyroxine 125 mcg Oral Early Morning Pascale Haven, DO   loperamide 2 mg Oral TID PRN MER Guadalupe   LORazepam 2 mg Oral TID PRN Edison Haven, DO   metoprolol tartrate 50 mg Oral Q12H [address] Pacsale Haven, DO   ondansetron 4 mg Intravenous Q4H PRN Ashok Pineda MD   pravastatin 80 mg Oral Daily Edison Haven, DO   predniSONE 5 mg Oral Daily Pascale Haven, DO   rOPINIRole 0 25 mg Oral BID Pascale Haven, DO   saccharomyces boulardii 250 mg Oral BID Edison Haven, DO   sertraline 200 mg Oral Daily Pascale Haven, DO   sevelamer 800 mg Oral TID With Meals Edison Haven, DO   sodium bicarbonate 1,300 mg Oral BID after meals Chevy Taylor MD   spironolactone 25 mg Oral Daily Cynthia Zaidi MD   tacrolimus 2 mg Oral Daily Seamus Fu, DO   tacrolimus 2 mg Oral HS Chevy Taylor MD        Today, Patient Was Seen By: MER Hsu    ** Please Note: Dragon 360 Dictation voice to text software may have been used in the creation of this document   **

## 2019-12-26 NOTE — SOCIAL WORK
CM reviewed pt with [facility] provider Matt Vasquez  Pt not medically clear for d/c  GI consulted  Pt reported 10 episodes of diarrhea  Diet changed to low residual  Possible d/c within 24 hours  CM notified [address] and [address]  [facility] unable to admit pt at this time  Pt requires ISO and is potential for LTC  Pt was followed by Dori Magaña, Robert Leo, and Terese POND met with pt at bedside to discuss the denial from [facility] and [facility]  Pt reported that she prefers CM contact her father to discuss the further options and placement  Pt reported father would be in hospital after 3pm  CM asked pt if she could call, pt agreeable  CM contacted pt's father, Rachid Terrell [phone]  CM explained that pt was denied from [facility] and [facility] due to her possibly of LTC and need for iso  Pt's father reported that the plan was not for long term and she would return home after she was discharged from rehab  CM confirmed with Rachid Terrell that he was not planning for long term  Rachid Terrell reported that he wanted [facility] or [facility]  CM explained the process for LTC application for financial reasons  CM will meet with pt and pt's father in room after 3PM to discuss further

## 2019-12-26 NOTE — ASSESSMENT & PLAN NOTE
· With history of recurrent UTIs in setting of renal and pancreatic transplant  Most recently with VRE at recent admission to [facility]  Presenting with abdominal pain/nausea and lethargy/confusion  · CT abdomen/pelvis showing colitis  · Stool cdiff neg x2, enteric panel neg, leucocytes neg  · Checked CMV PCR - negative  · Patient reports 10 episodes of watery mucousy diarrhea past 24 hours, reports abdominal cramping before diarrhea, abdominal bloating and diffuse abdominal discomfort  · Patient afebrile, WBC normal    · Will increase Bentyl to a c  And HS, continue Imodium p r n  Stephy Serum · Change diet to low residual   · Consult GI

## 2019-12-26 NOTE — ASSESSMENT & PLAN NOTE
Blood pressures have remained variable  Patient currently on clonidine t i d  , hydralazine Q 8, And Cardura, metoprolol  Norvasc was discontinued, now resumed  Continue to monitor  Added spironolactone  One dose of torsemide on 12/24  Received albumin and Lasix on 12/25  Monitor

## 2019-12-26 NOTE — ASSESSMENT & PLAN NOTE
· Baseline creatinine 2 5-3  · Creatinine stable  · Monitor volume status, not all UO documented  · Monitor BMP while inpatient  · One dose of torsemide on Prandin day 4 and received albumin and Lasix IV yesterday per Nephrology  · Avoid nephrotoxins as able, renally dose medications as indicated  · Appreciate Nephrology input  Now on p o  Bicarb and off bicarb infusion  · Spironolactone added due to hypokalemia  Consider discontinuing spironolactone if creatinine continues to trend up

## 2019-12-27 ENCOUNTER — APPOINTMENT (INPATIENT)
Dept: RADIOLOGY | Facility: HOSPITAL | Age: 54
DRG: 391 | End: 2019-12-27
Payer: MEDICARE

## 2019-12-27 PROBLEM — I50.33 ACUTE ON CHRONIC DIASTOLIC CONGESTIVE HEART FAILURE (HCC): Status: ACTIVE | Noted: 2019-12-24

## 2019-12-27 LAB
ALBUMIN SERPL BCP-MCNC: 2.7 G/DL (ref 3.5–5)
ALP SERPL-CCNC: 89 U/L (ref 46–116)
ALT SERPL W P-5'-P-CCNC: 13 U/L (ref 12–78)
ANION GAP SERPL CALCULATED.3IONS-SCNC: 9 MMOL/L (ref 4–13)
AST SERPL W P-5'-P-CCNC: 9 U/L (ref 5–45)
BILIRUB SERPL-MCNC: 0.34 MG/DL (ref 0.2–1)
BUN SERPL-MCNC: 39 MG/DL (ref 5–25)
CALCIUM SERPL-MCNC: 8.5 MG/DL (ref 8.3–10.1)
CHLORIDE SERPL-SCNC: 112 MMOL/L (ref 100–108)
CO2 SERPL-SCNC: 20 MMOL/L (ref 21–32)
CREAT SERPL-MCNC: 2.83 MG/DL (ref 0.6–1.3)
ERYTHROCYTE [DISTWIDTH] IN BLOOD BY AUTOMATED COUNT: 18 % (ref 11.6–15.1)
GFR SERPL CREATININE-BSD FRML MDRD: 18 ML/MIN/1.73SQ M
GLUCOSE SERPL-MCNC: 101 MG/DL (ref 65–140)
GLUCOSE SERPL-MCNC: 108 MG/DL (ref 65–140)
GLUCOSE SERPL-MCNC: 116 MG/DL (ref 65–140)
GLUCOSE SERPL-MCNC: 132 MG/DL (ref 65–140)
HCT VFR BLD AUTO: 24.2 % (ref 34.8–46.1)
HGB BLD-MCNC: 7.3 G/DL (ref 11.5–15.4)
MAGNESIUM SERPL-MCNC: 2.8 MG/DL (ref 1.6–2.6)
MCH RBC QN AUTO: 31.7 PG (ref 26.8–34.3)
MCHC RBC AUTO-ENTMCNC: 30.2 G/DL (ref 31.4–37.4)
MCV RBC AUTO: 105 FL (ref 82–98)
PHOSPHATE SERPL-MCNC: 5.2 MG/DL (ref 2.7–4.5)
PLATELET # BLD AUTO: 157 THOUSANDS/UL (ref 149–390)
PMV BLD AUTO: 13 FL (ref 8.9–12.7)
POTASSIUM SERPL-SCNC: 4.1 MMOL/L (ref 3.5–5.3)
PROT SERPL-MCNC: 6.7 G/DL (ref 6.4–8.2)
RBC # BLD AUTO: 2.3 MILLION/UL (ref 3.81–5.12)
SODIUM SERPL-SCNC: 141 MMOL/L (ref 136–145)
TSH SERPL DL<=0.05 MIU/L-ACNC: 5.66 UIU/ML (ref 0.36–3.74)
WBC # BLD AUTO: 5.41 THOUSAND/UL (ref 4.31–10.16)

## 2019-12-27 PROCEDURE — 84100 ASSAY OF PHOSPHORUS: CPT | Performed by: NURSE PRACTITIONER

## 2019-12-27 PROCEDURE — 82948 REAGENT STRIP/BLOOD GLUCOSE: CPT

## 2019-12-27 PROCEDURE — 85027 COMPLETE CBC AUTOMATED: CPT | Performed by: NURSE PRACTITIONER

## 2019-12-27 PROCEDURE — 99232 SBSQ HOSP IP/OBS MODERATE 35: CPT | Performed by: INTERNAL MEDICINE

## 2019-12-27 PROCEDURE — 84443 ASSAY THYROID STIM HORMONE: CPT | Performed by: NURSE PRACTITIONER

## 2019-12-27 PROCEDURE — 83735 ASSAY OF MAGNESIUM: CPT | Performed by: NURSE PRACTITIONER

## 2019-12-27 PROCEDURE — 97530 THERAPEUTIC ACTIVITIES: CPT

## 2019-12-27 PROCEDURE — 80053 COMPREHEN METABOLIC PANEL: CPT | Performed by: NURSE PRACTITIONER

## 2019-12-27 PROCEDURE — 74022 RADEX COMPL AQT ABD SERIES: CPT

## 2019-12-27 PROCEDURE — 99232 SBSQ HOSP IP/OBS MODERATE 35: CPT | Performed by: NURSE PRACTITIONER

## 2019-12-27 RX ORDER — POLYETHYLENE GLYCOL 3350 17 G/17G
34 POWDER, FOR SOLUTION ORAL DAILY
Status: DISCONTINUED | OUTPATIENT
Start: 2019-12-27 | End: 2020-01-02 | Stop reason: HOSPADM

## 2019-12-27 RX ORDER — FUROSEMIDE 10 MG/ML
40 INJECTION INTRAMUSCULAR; INTRAVENOUS EVERY 6 HOURS
Status: COMPLETED | OUTPATIENT
Start: 2019-12-27 | End: 2019-12-27

## 2019-12-27 RX ORDER — POTASSIUM CHLORIDE 20 MEQ/1
40 TABLET, EXTENDED RELEASE ORAL ONCE
Status: COMPLETED | OUTPATIENT
Start: 2019-12-27 | End: 2019-12-27

## 2019-12-27 RX ORDER — ALBUMIN (HUMAN) 12.5 G/50ML
12.5 SOLUTION INTRAVENOUS EVERY 6 HOURS
Status: COMPLETED | OUTPATIENT
Start: 2019-12-27 | End: 2019-12-27

## 2019-12-27 RX ADMIN — PREDNISONE 5 MG: 5 TABLET ORAL at 09:11

## 2019-12-27 RX ADMIN — ARIPIPRAZOLE 30 MG: 10 TABLET ORAL at 22:55

## 2019-12-27 RX ADMIN — FUROSEMIDE 40 MG: 10 INJECTION, SOLUTION INTRAMUSCULAR; INTRAVENOUS at 14:52

## 2019-12-27 RX ADMIN — TACROLIMUS 2 MG: 1 CAPSULE ORAL at 09:15

## 2019-12-27 RX ADMIN — SPIRONOLACTONE 25 MG: 25 TABLET ORAL at 09:13

## 2019-12-27 RX ADMIN — SODIUM BICARBONATE 650 MG TABLET 1300 MG: at 17:45

## 2019-12-27 RX ADMIN — BUSPIRONE HYDROCHLORIDE 5 MG: 5 TABLET ORAL at 09:12

## 2019-12-27 RX ADMIN — SODIUM BICARBONATE 650 MG TABLET 1300 MG: at 09:10

## 2019-12-27 RX ADMIN — MELATONIN 3000 UNITS: at 09:12

## 2019-12-27 RX ADMIN — Medication: at 17:45

## 2019-12-27 RX ADMIN — FERROUS SULFATE TAB 325 MG (65 MG ELEMENTAL FE) 325 MG: 325 (65 FE) TAB at 13:55

## 2019-12-27 RX ADMIN — Medication 250 MG: at 09:11

## 2019-12-27 RX ADMIN — SEVELAMER HYDROCHLORIDE 1600 MG: 800 TABLET, FILM COATED PARENTERAL at 14:21

## 2019-12-27 RX ADMIN — PRAVASTATIN SODIUM 80 MG: 80 TABLET ORAL at 09:10

## 2019-12-27 RX ADMIN — ROPINIROLE 0.25 MG: 0.25 TABLET, FILM COATED ORAL at 17:46

## 2019-12-27 RX ADMIN — TACROLIMUS 2 MG: 1 CAPSULE ORAL at 20:36

## 2019-12-27 RX ADMIN — SEVELAMER HYDROCHLORIDE 1600 MG: 800 TABLET, FILM COATED PARENTERAL at 09:10

## 2019-12-27 RX ADMIN — HYDRALAZINE HYDROCHLORIDE 50 MG: 50 TABLET, FILM COATED ORAL at 22:55

## 2019-12-27 RX ADMIN — HYDRALAZINE HYDROCHLORIDE 50 MG: 50 TABLET, FILM COATED ORAL at 13:57

## 2019-12-27 RX ADMIN — ALBUMIN (HUMAN) 12.5 G: 25 SOLUTION INTRAVENOUS at 17:49

## 2019-12-27 RX ADMIN — SERTRALINE HYDROCHLORIDE 200 MG: 100 TABLET ORAL at 09:12

## 2019-12-27 RX ADMIN — HEPARIN SODIUM 5000 UNITS: 5000 INJECTION INTRAVENOUS; SUBCUTANEOUS at 06:11

## 2019-12-27 RX ADMIN — METOPROLOL TARTRATE 50 MG: 50 TABLET, FILM COATED ORAL at 09:11

## 2019-12-27 RX ADMIN — DULOXETINE HYDROCHLORIDE 60 MG: 60 CAPSULE, DELAYED RELEASE ORAL at 09:10

## 2019-12-27 RX ADMIN — CLONIDINE HYDROCHLORIDE 0.2 MG: 0.1 TABLET ORAL at 14:07

## 2019-12-27 RX ADMIN — ALBUMIN (HUMAN) 12.5 G: 25 SOLUTION INTRAVENOUS at 14:02

## 2019-12-27 RX ADMIN — BUSPIRONE HYDROCHLORIDE 5 MG: 5 TABLET ORAL at 17:46

## 2019-12-27 RX ADMIN — DULOXETINE HYDROCHLORIDE 60 MG: 60 CAPSULE, DELAYED RELEASE ORAL at 17:45

## 2019-12-27 RX ADMIN — CLONIDINE HYDROCHLORIDE 0.2 MG: 0.1 TABLET ORAL at 09:13

## 2019-12-27 RX ADMIN — AMLODIPINE BESYLATE 5 MG: 5 TABLET ORAL at 09:12

## 2019-12-27 RX ADMIN — SEVELAMER HYDROCHLORIDE 1600 MG: 800 TABLET, FILM COATED PARENTERAL at 17:45

## 2019-12-27 RX ADMIN — ROPINIROLE 0.25 MG: 0.25 TABLET, FILM COATED ORAL at 09:10

## 2019-12-27 RX ADMIN — ASPIRIN 81 MG 81 MG: 81 TABLET ORAL at 09:10

## 2019-12-27 RX ADMIN — DOXAZOSIN 2 MG: 2 TABLET ORAL at 22:55

## 2019-12-27 RX ADMIN — METOPROLOL TARTRATE 50 MG: 50 TABLET, FILM COATED ORAL at 20:36

## 2019-12-27 RX ADMIN — CLONIDINE HYDROCHLORIDE 0.2 MG: 0.1 TABLET ORAL at 22:56

## 2019-12-27 RX ADMIN — FUROSEMIDE 40 MG: 10 INJECTION, SOLUTION INTRAMUSCULAR; INTRAVENOUS at 18:20

## 2019-12-27 RX ADMIN — FOLIC ACID 1000 MCG: 1 TABLET ORAL at 09:11

## 2019-12-27 RX ADMIN — POTASSIUM CHLORIDE 40 MEQ: 1500 TABLET, EXTENDED RELEASE ORAL at 14:19

## 2019-12-27 RX ADMIN — LEVOTHYROXINE SODIUM 125 MCG: 125 TABLET ORAL at 06:11

## 2019-12-27 RX ADMIN — HYDRALAZINE HYDROCHLORIDE 50 MG: 50 TABLET, FILM COATED ORAL at 06:11

## 2019-12-27 NOTE — PROGRESS NOTES
Progress Note - Santiago Ny 1965, 53 y o  female MRN: [id number]    Unit/Bed#: -01 Encounter: [id number]    Primary Care Provider: Hola Flores MD   Date and time admitted to hospital: 12/9/2019  8:29 PM        * Abdominal pain  Assessment & Plan  · With history of recurrent UTIs in setting of renal and pancreatic transplant  Most recently with VRE at recent admission to [facility]  Presenting with abdominal pain/nausea and lethargy/confusion  · CT abdomen/pelvis on admission - bowel wall thickening involving the proximal to mid sigmoid and the distal left colon  This may be due to colitis  · Stool cdiff neg x2, enteric panel neg, leucocytes neg  · Checked CMV PCR - negative  · Patient afebrile, WBC normal    · Consulted GI, appreciate input  Do not suspect colitis  Infectious workup negative  Symptoms likely due to constipation/fecal stasis  Abdominal pain improved with MiraLax  GI recommend starting MiraLax daily  Outpatient colonoscopy  Follow-up GI in 1 month  GI signed off  Diarrhea  Assessment & Plan  Likely secondary to constipation/fecal stasis  Stool culture, C diff negative  Consulted GI, appreciate input  Patient finished half bottle of MiraLax yesterday with multiple soft bowel movement  Reports feeling better today  Started on MiraLax per GI  Titrate to at least 1 bowel movement every other day and okay to hold for >BM/day  Check OBS today  Acute on chronic diastolic congestive heart failure (HCC)  Assessment & Plan  ProBNP 20,640  2+ edema in lower extremity  Venous Doppler negative for DVT  2D echo - normal EF,G1DD,no RWMA, moderate aortic regurgitation, trace pericardial effusion  Received 1 dose of torsemide on 12/24 per Nephrology  Received albumin plus IV Lasix past 2 days per Nephrology  Decreased Norvasc to 5 mg p o  Daily per Nephrology  Continue metoprolol  Daily weight and I&Os    Appreciate Nephrology input  Bacteremia  Assessment & Plan  · Final cultures Grew Porphyromonas asaccharolytica  ID has been monitoring off antibiotics  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, with post infectious IBS  Patient did not tolerate questran  Team discussed with ID  No additional antibiotics at this time  Asymptomatic bacteriuria  Assessment & Plan  · Recently completed 7 D course of IV daptomycin for VRE UTI  Monitoring off antibiotic therapy  · Has positive cultures for VRE again, but this is not the cause of her symptoms, its colonization  · Monitor off Abx      Metabolic acidosis  Assessment & Plan  · Appears acute on chronic  · Bicarb 20 today  · S/P kidney and pancreatic transplant in 1998  · continue p o  Sodium bicarb and trend BMP  · Patient with an RTA related to her pancreatic transplant per Nephrology  · P o  Bicarb supplementation as per Nephrology, appreciate input  Multiple myeloma not having achieved remission [facility])  Assessment & Plan  · Follows with Dr Anthony Alcaraz as outpatient  · Continue Revlimid at home dosage  Currently off Revlimid  · Hemoglobin stable between 7-8  · Known history of transfusions in past   Has multiple autoantibodies  Essential hypertension  Assessment & Plan  Blood pressures have remained variable  Patient currently on clonidine t i d  , hydralazine Q 8, And Cardura, metoprolol  Norvasc was discontinued, now resumed  Continue to monitor  Added spironolactone  One dose of torsemide on 12/24  Received albumin and Lasix on 12/25 and yesterday  Monitor      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 [facility])  Assessment & Plan  Lab Results   Component Value Date    HGBA1C 5 1 09/09/2019       Recent Labs     12/26/19  1059 12/26/19  1630 12/26/19  2038 12/27/19  0731   POCGLU 125 125 129 116       Blood Sugar Average: Last 72 hrs:  · (P) 945 [id number] stopped Lantus (in place of Toujeo) 5 units qHS due to hypoglycemia  · Cont SSI with accu-cheks and carb controlled diet  · Initiate hypoglycemia protocol and avoid hypoglycemia  · Repeat A1c    Chronic kidney disease, stage 4 (severe) ([facility])  Assessment & Plan  · Baseline creatinine 2 5-3  · Creatinine stable  · Monitor volume status, not all UO documented  · Monitor BMP while inpatient  · Received one dose of torsemide since admission, received albumin and Lasix IV past 2 days per Nephrology  · Avoid nephrotoxins as able, renally dose medications as indicated  · Appreciate Nephrology input  Now on p o  Bicarb and off bicarb infusion  · Spironolactone added due to hypokalemia  Consider discontinuing spironolactone if creatinine continues to trend up  Renal transplant recipient  Assessment & Plan  · On chronic immunosuppression with tacrolimus and prednisone  · Unfortunately patient noncompliant with all medications since discharge including these  · Tacrolimus level was 4 > 10 > 7 (12/12) > 5 (12/14) remains stable at 4 4  · dose decreased to 2 BID on 12/12, again decrease to 2 mg QAM & 1 5 mg HS from 12/14  · Tacrolimus level was stable on the current dosing  · Last level 4 5        VTE Pharmacologic Prophylaxis:   Pharmacologic: Pharmacologic VTE Prophylaxis contraindicated due to bleeding from injection site,diffuse abdomen ecchymosis from injection  Mechanical VTE Prophylaxis in Place: Yes    Patient Centered Rounds: I have performed bedside rounds with nursing staff today  Discussions with Specialists or Other Care Team Provider:  Yes    Education and Discussions with Family / Patient:  Yes    Time Spent for Care: 20 minutes  More than 50% of total time spent on counseling and coordination of care as described above      Current Length of Stay: 17 day(s)    Current Patient Status: Inpatient   Certification Statement: The patient will continue to require additional inpatient hospital stay due to Abdominal pain, constipation, acute on chronic CHF    Discharge Plan:  Short-term rehab once medically cleared    Code Status: Level 1 - Full Code      Subjective:   Patient reports feeling much better, less abdominal bloating and discomfort  Had good bowel movements after taking MiraLax yesterday  Denies nausea, vomiting  Denies chest pain, headaches with dizziness, SOB, fever, chills  Reports leg edema and feeling tight  Reports abdominal sore from heparin injection and site is bleeding  Reports sleep well last night  Objective:     Vitals:   Temp (24hrs), Av 4 °F (36 9 °C), Min:98 4 °F (36 9 °C), Max:98 4 °F (36 9 °C)    Temp:  [98 4 °F (36 9 °C)] 98 4 °F (36 9 °C)  HR:  [65] 65  Resp:  [17] 17  BP: (147-169)/(55-67) 147/55  SpO2:  [93 %] 93 %  Body mass index is 36 44 kg/m²  Input and Output Summary (last 24 hours): Intake/Output Summary (Last 24 hours) at 2019 1016  Last data filed at 2019 2668  Gross per 24 hour   Intake 1320 ml   Output 1525 ml   Net -205 ml       Physical Exam:     Physical Exam   Constitutional: She is oriented to person, place, and time  She appears well-developed  Patient appears better today  HENT:   Head: Normocephalic and atraumatic  Neck: Normal range of motion  Neck supple  No JVD present  No tracheal deviation present  No thyromegaly present  Cardiovascular: Normal rate and regular rhythm  No murmur heard  Pulmonary/Chest: Effort normal and breath sounds normal  No respiratory distress  She has no wheezes  She has no rales  Abdominal: Soft  Bowel sounds are normal  She exhibits no distension  There is tenderness  There is no guarding  Diffuse ecchymosis abdominal wall  Once injection site is oozing blood  Mild diffuse tenderness to abdomen  Mild abdomen distension  Musculoskeletal: Normal range of motion  She exhibits edema and deformity  She exhibits no tenderness  2+ lower extremity edema  Status post left TMA  Neurological: She is alert and oriented to person, place, and time     Skin: Skin is warm and dry  Psychiatric: She has a normal mood and affect  Judgment normal    Nursing note and vitals reviewed  Additional Data:     Labs:    Results from last 7 days   Lab Units 12/27/19  0617  12/21/19  0918   WBC Thousand/uL 5 41   < > 8 54   HEMOGLOBIN g/dL 7 3*   < > 7 6*   HEMATOCRIT % 24 2*   < > 24 7*   PLATELETS Thousands/uL 157   < > 163   NEUTROS PCT %  --   --  71   LYMPHS PCT %  --   --  14   MONOS PCT %  --   --  9   EOS PCT %  --   --  4    < > = values in this interval not displayed  Results from last 7 days   Lab Units 12/27/19  0617   POTASSIUM mmol/L 4 1   CHLORIDE mmol/L 112*   CO2 mmol/L 20*   BUN mg/dL 39*   CREATININE mg/dL 2 83*   CALCIUM mg/dL 8 5   ALK PHOS U/L 89   ALT U/L 13   AST U/L 9           * I Have Reviewed All Lab Data Listed Above  * Additional Pertinent Lab Tests Reviewed:  [facility] Admission Reviewed    Imaging:    Imaging Reports Reviewed Today Include:  2D echo  Imaging Personally Reviewed by Myself Includes:  None    Recent Cultures (last 7 days):           Last 24 Hours Medication List:     Current Facility-Administered Medications:  acetaminophen 650 mg Oral Q6H PRN Kourtney Ortiz PA-C   aluminum-magnesium hydroxide-simethicone 30 mL Oral Q4H PRN Kuortney Ortiz PA-C   amLODIPine 5 mg Oral Daily Connor Marques MD   ARIPiprazole 30 mg Oral HS Immaculata Devoid, DO   aspirin 81 mg Oral Daily Immaculata Devoid, DO   busPIRone 5 mg Oral BID Immaculata Devoid, DO   cholecalciferol 3,000 Units Oral Daily David Devoid, DO   cloNIDine 0 2 mg Oral [facility] Connor Marques MD   doxazosin 2 mg Oral HS Chinita Castleman, MD   DULoxetine 60 mg Oral BID David Devoid, DO   ferrous sulfate 325 mg Oral Daily With Breakfast Immaculata Devoid, DO   folic acid 0,237 mcg Oral Daily Immaculata Devoid, DO   heparin (porcine) 5,000 Units Subcutaneous Q8H [facility] Immaculata Devoid, DO   hydrALAZINE 5 mg Intravenous Q6H PRN Denise Solomon MD   hydrALAZINE 50 mg Oral Q8H [facility] Humza David Ly MD   insulin lispro 1-5 Units Subcutaneous HS Tereso Landing, DO   insulin lispro 1-6 Units Subcutaneous TID AC Donavan Banuelos MD   levothyroxine 125 mcg Oral Early Morning Tereso Landing, DO   LORazepam 2 mg Oral TID PRN Tereso Landing, DO   metoprolol tartrate 50 mg Oral Q12H [address] Tereso Landing, DO   ondansetron 4 mg Intravenous Q4H PRN Maru Diaz MD   polyethylene glycol 34 g Oral Daily Janna Pedraza MD   pravastatin 80 mg Oral Daily Tereso Landing, DO   predniSONE 5 mg Oral Daily Tereso Landing, DO   rOPINIRole 0 25 mg Oral BID Tereso Landing, DO   saccharomyces boulardii 250 mg Oral BID Tereso Landing, DO   sertraline 200 mg Oral Daily Tereso Landing, DO   sevelamer 1,600 mg Oral TID With Meals MER Campos   sodium bicarbonate 1,300 mg Oral BID after meals Cesar Spain MD   spironolactone 25 mg Oral Daily Selena Green MD   tacrolimus 2 mg Oral Daily Seamus Fu, DO   tacrolimus 2 mg Oral HS Cesar Spain MD        Today, Patient Was Seen By: MER Loyd    ** Please Note: Dragon 360 Dictation voice to text software may have been used in the creation of this document   **

## 2019-12-27 NOTE — PROGRESS NOTES
[facility]  Progress Note - Santiago Najera 53 y o  female MRN: [id number]    Unit/Bed#: -01 Encounter: [id number]    Assessment/Plan:  Constipation  Fecal stasis  -do not suspect colitis and no evidence of diarrhea-infectious workup negative  -patient finished half bottle of MiraLax yesterday with multiple bowel movements and significant improvement in her abdominal discomfort  -place patient on maintenance bowel regimen of MiraLax 2 times daily and titrate to at least 1 bowel movement every other day(okay to hold for >2BM/day)  -continue to hold loperamide, if pain persists okay to use Bentyl as needed  -outpatient colonoscopy  -patient lives in the [place] area so will set up follow-up in 1 month at our [address] will sign off  Please call with any further questions or concerns    Subjective:   Patient seen examined  Very appreciated over help  Had multiple bowel movements with improvement in abdominal pain and discomfort  Tolerating diet  Objective:     Vitals: Blood pressure 147/55, pulse 65, temperature 98 4 °F (36 9 °C), resp  rate 17, height 5' 8" (1 727 m), weight 109 kg (239 lb 10 2 oz), SpO2 93 %  ,Body mass index is 36 44 kg/m²  Intake/Output Summary (Last 24 hours) at 12/27/2019 6969  Last data filed at 12/26/2019 2001  Gross per 24 hour   Intake 1080 ml   Output 1125 ml   Net -45 ml       Review of Systems: as per HPI  Review of Systems    Physical Exam:     Physical Exam   Constitutional: She is oriented to person, place, and time  No distress  HENT:   Head: Atraumatic  Neck: Neck supple  Cardiovascular: Normal rate  Pulmonary/Chest: Effort normal    Abdominal: Soft  Bowel sounds are normal  She exhibits no distension  There is no tenderness  Musculoskeletal: She exhibits edema  Neurological: She is alert and oriented to person, place, and time  Skin: Skin is warm and dry  Psychiatric: She has a normal mood and affect           Invasive Devices     Peripheral Intravenous Line            Peripheral IV 12/26/19 Left Forearm 1 day                        CBC:   Lab Results   Component Value Date    WBC 5 41 12/27/2019    HGB 7 3 (L) 12/27/2019    HCT 24 2 (L) 12/27/2019     (H) 12/27/2019     12/27/2019    MCH 31 7 12/27/2019    MCHC 30 2 (L) 12/27/2019    RDW 18 0 (H) 12/27/2019    MPV 13 0 (H) 12/27/2019   ,   CMP:   Lab Results   Component Value Date    K 4 1 12/27/2019     (H) 12/27/2019    CO2 20 (L) 12/27/2019    BUN 39 (H) 12/27/2019    CREATININE 2 83 (H) 12/27/2019    CALCIUM 8 5 12/27/2019    AST 9 12/27/2019    ALT 13 12/27/2019    ALKPHOS 89 12/27/2019    EGFR 18 12/27/2019

## 2019-12-27 NOTE — PHYSICAL THERAPY NOTE
Physical Therapy Progress Note     12/27/19 1515   Pain Assessment   Pain Assessment 0-10   Pain Score 5   Pain Location Leg   Pain Orientation Bilateral   Restrictions/Precautions   Weight Bearing Precautions Per Order No   Other Precautions Contact/isolation; Chair Alarm; Fall Risk;Pain;Multiple lines   General   Family/Caregiver Present Yes   Cognition   Overall Cognitive Status WFL   Arousal/Participation Alert; Responsive   Transfers   Sit to Stand 5  Supervision   Additional items Assist x 1;Verbal cues   Stand to Sit 5  Supervision   Additional items Assist x 1;Verbal cues   Ambulation/Elevation   Gait pattern   (slow, short step length)   Gait Assistance 4  Minimal assist   Additional items Assist x 1;Verbal cues   Assistive Device Rolling walker   Distance 50 feet   Balance   Static Sitting Fair +   Static Standing Fair   Dynamic Standing Fair -   Ambulatory Poor +   Endurance Deficit   Endurance Deficit Yes   Endurance Deficit Description pain   Activity Tolerance   Activity Tolerance Patient limited by pain   Nurse [address] to see per MICHELLE Kru[facility]   Assessment   Prognosis Fair   Problem List Decreased strength;Decreased endurance; Impaired balance;Decreased mobility; Impaired judgement;Pain   Assessment Pt is making slow but steady progress with the use of a rolling walker  Pt father present during session  Limited by BLE pain as pt states "both my legs hurt because of the fluid "  With encouragement, agrees to participate  Min assist required for safe walker management  Distance limited today due to pain  Declined BLE exercises stating "I keep up with my exercises "  Pt would benefit from continued physical therapy to maximize functional independence     Goals   Patient Goals To go home   STG Expiration Date 01/06/20   Short Term Goal #1 1-2 wks: bed mob and transfers w/ indep, standing balance to good/normal dynamically, ambulate 200-300 ft w/ indep, increase B LE strength by 1/2 -1 grade, ambulate 3 JESSI w/S   PT Treatment Day 4   Plan   Treatment/Interventions LE strengthening/ROM; Functional transfer training; Therapeutic exercise; Endurance training;Patient/family training;Bed mobility;Gait training;Spoke to nursing   Progress Progressing toward goals   PT Frequency   (3-5x/week)   Recommendation   Recommendation Post acute IP rehab   Equipment Recommended Virgilio  (VANGIE)     Tony Stevenson, PTA

## 2019-12-27 NOTE — PROGRESS NOTES
NEPHROLOGY PROGRESS NOTE   Georgette Peng 53 y o  female MRN: [id number]  Unit/Bed#: -01 Encounter: [id number]      ASSESSMENT/PLAN:  Chronic kidney disease, stage IV:  Status post kidney and pancreas transplant   - per medical record review, baseline creatinine in the mid 2s  - patient follows in our office as an outpatient  She will need follow-up upon discharge  - continue current immunosuppressive medications  Acute kidney injury:  Likely secondary to volume depletion  - peak creatinine 3 39   - most recent creatinine stable at 2 83   - status post 2 days of IV Lasix and albumin  Torsemide given once on 12/24    - monitor volume status closely with strict intake/output  Daily standing weight  - avoid NSAIDs, nephrotoxic agents, hypotension, IV contrast   - trend daily BMP  Hypertension:   - blood pressure was slightly elevated yesterday  Blood pressure this a m  Improved  - continue to monitor blood pressure closely with changes in regimen  - avoid hypotension/fluctuations in blood pressure  - currently on:  Amlodipine 5 mg daily, clonidine 0 2 mg every 8 hours, doxazosin 2 mg daily, hydralazine 50 mg every 8 hours, metoprolol tartrate 50 mg b i d , and Aldactone 25 mg daily  Electrolytes:   - bicarbonate 20 today  Maintain oral sodium bicarb supplements  - continue to monitor daily BMP  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:  - most recent phosphorus elevated at 5 2 today  - Renagel increased to 1600 mg t i d  Yesterday   - maintain low phosphorus diet  - most recent magnesium slightly elevated at 2 8  Anemia of chronic kidney disease:  - most recent hemoglobin 7 3 today  - Iron Sat 28%, TIBC 190, Iron 54  - patient with history of multiple myeloma and follows with Hematology  Leg edema:  - TSH: 5 660   - UPC ratio: 1 62   - lower extremity Doppler negative for DVT  - echocardiogram ordered    - patient is status post 2 days of IV Lasix and albumin, 1 day post torsemide   - leg edema with minimal improvement  - amlodipine decreased to 5 mg daily yesterday  Diarrhea:  - diarrhea has improved  - per GI,  do not suspect colitis  Placed on bowel regimen  Will follow as outpatient   - management per primary team      SUBJECTIVE:  Patient resting in chair comfortably  Patient denies shortness of breath, chest pain, nausea, vomiting, diarrhea  OBJECTIVE:  Current Weight: Weight - Scale: 109 kg (239 lb 10 2 oz)  Vitals:    12/27/19 0728   BP: 147/55   Pulse: 65   Resp: 17   Temp: 98 4 °F (36 9 °C)   SpO2: 93%       Intake/Output Summary (Last 24 hours) at 12/27/2019 1046  Last data filed at 12/27/2019 2632  Gross per 24 hour   Intake 1320 ml   Output 1525 ml   Net -205 ml       General:  No acute distress, resting in chair  Skin:  Warm, no rash  Eyes:  Sclerae anicteric  ENT:  Moist lips and mucous membranes  Neck:  Supple, no JVD, trachea midline  Chest:  Clear breath sounds bilaterally   CVS:  Normal rate and rhythm, no rubs or gallops appreciated  Abdomen:  Soft, tender , distended , normoactive bowel sounds  Diffuse ecchymosis     Extremities:  +2 bilateral lower extremity edema  Neuro:  Alert oriented  Psych:  Appropriate affect      Medications:  Scheduled Meds:  Current Facility-Administered Medications:  acetaminophen 650 mg Oral Q6H PRN Kourtney Ortiz PA-C   aluminum-magnesium hydroxide-simethicone 30 mL Oral Q4H PRN Kourtney Ortiz PA-C   amLODIPine 5 mg Oral Daily Cody Sharp MD   ARIPiprazole 30 mg Oral HS Narvis More, DO   aspirin 81 mg Oral Daily Narvis More, DO   busPIRone 5 mg Oral BID Narvis More, DO   cholecalciferol 3,000 Units Oral Daily Narvis More, DO   cloNIDine 0 2 mg Oral [facility] Cody Sharp MD   doxazosin 2 mg Oral HS Yumi Fragoso MD   DULoxetine 60 mg Oral BID Narvis More, DO   ferrous sulfate 325 mg Oral Daily With Breakfast Narvis More, DO   folic acid 1,395 mcg Oral Daily Narvis More, DO   hydrALAZINE 5 mg Intravenous Q6H PRN Lilliana Noble MD   hydrALAZINE 50 mg Oral Q8H [address] Carlos Alejo MD   insulin lispro 1-5 Units Subcutaneous HS Lincoln Willian, DO   insulin lispro 1-6 Units Subcutaneous TID AC Lilliana Noble MD   levothyroxine 125 mcg Oral Early Morning Lincoln Willian, DO   LORazepam 2 mg Oral TID PRN Lincoln Willian, DO   metoprolol tartrate 50 mg Oral Q12H [address] Lincoln Willian, DO   ondansetron 4 mg Intravenous Q4H PRN Johanna Durant MD   polyethylene glycol 34 g Oral Daily Ting Raymundo MD   pravastatin 80 mg Oral Daily Lincoln Willian, DO   predniSONE 5 mg Oral Daily Lincoln Willian, DO   rOPINIRole 0 25 mg Oral BID Lincoln Willian, DO   saccharomyces boulardii 250 mg Oral BID Lincoln Willian, DO   sertraline 200 mg Oral Daily Lincoln Willian, DO   sevelamer 1,600 mg Oral TID With Meals MER Campos   sodium bicarbonate 1,300 mg Oral BID after meals Rex Brady MD   spironolactone 25 mg Oral Daily Maire Heimlich, MD   tacrolimus 2 mg Oral Daily Regmax Garza, DO   tacrolimus 2 mg Oral HS Rex Brady MD       PRN Meds:   acetaminophen    aluminum-magnesium hydroxide-simethicone    hydrALAZINE    LORazepam    ondansetron    Continuous Infusions:     Laboratory Results:  Results from last 7 days   Lab Units 12/27/19  0617 12/26/19  0428 12/26/19  4145 12/25/19  0748 12/24/19  0518 12/23/19  0438 12/22/19  0602 12/21/19  0918   WBC Thousand/uL 5 41  --  7 76 6 10  --  7 89  --  8 54   HEMOGLOBIN g/dL 7 3*  --  7 2* 7 4*  --  7 4*  --  7 6*   HEMATOCRIT % 24 2*  --  23 0* 23 8*  --  23 8*  --  24 7*   PLATELETS Thousands/uL 157  --  157 156  --  158  --  163   SODIUM mmol/L 141 141  --  142 140 142 141 142   POTASSIUM mmol/L 4 1 3 8  --  4 0 4 0 3 3* 3 4* 3 3*   CHLORIDE mmol/L 112* 113*  --  114* 115* 117* 119* 119*   CO2 mmol/L 20* 21  --  20* 18* 20* 17* 15*   BUN mg/dL 39* 40*  --  34* 32* 32* 30* 32*   CREATININE mg/dL 2 83* 2 87*  --  2 94* 2 76* 2 61* 2 52* 2 70*   CALCIUM mg/dL 8 5 8 2*  --  8 3 8 1* 8 1* 8 3 8 0*   MAGNESIUM mg/dL 2 8*  --   --  2 6 2 7*  --   --  2 4   PHOSPHORUS mg/dL 5 2*  --   --  5 0* 4 4  --   --  5 0*

## 2019-12-27 NOTE — SOCIAL WORK
Pt's STR choices are [facility], [facility], [facility], [facility] and [facility] are not able to accept the pt  CM will meet with pt for additional choices for rehab  SLIM provider reviewed with CM that she feels pt needs new therapy evaluation  Pt is not medically clear for d/c at this time   CM sent TT to therapy team

## 2019-12-27 NOTE — ASSESSMENT & PLAN NOTE
· Final cultures Grew Porphyromonas asaccharolytica  ID has been monitoring off antibiotics  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, with post infectious IBS  Patient did not tolerate questran  Team discussed with ID  No additional antibiotics at this time

## 2019-12-27 NOTE — PLAN OF CARE
Problem: Potential for Falls  Goal: Patient will remain free of falls  Description  INTERVENTIONS:  - Assess patient frequently for physical needs  -  Identify cognitive and physical deficits and behaviors that affect risk of falls    -  [place] fall precautions as indicated by assessment   - Educate patient/family on patient safety including physical limitations  - Instruct patient to call for assistance with activity based on assessment  - Modify environment to reduce risk of injury  - Consider OT/PT consult to assist with strengthening/mobility  Outcome: Progressing     Problem: PAIN - ADULT  Goal: Verbalizes/displays adequate comfort level or baseline comfort level  Description  Interventions:  - Encourage patient to monitor pain and request assistance  - Assess pain using appropriate pain scale  - Administer analgesics based on type and severity of pain and evaluate response  - Implement non-pharmacological measures as appropriate and evaluate response  - Consider cultural and social influences on pain and pain management  - Notify physician/advanced practitioner if interventions unsuccessful or patient reports new pain  Outcome: Progressing

## 2019-12-27 NOTE — ASSESSMENT & PLAN NOTE
· With history of recurrent UTIs in setting of renal and pancreatic transplant  Most recently with VRE at recent admission to [facility]  Presenting with abdominal pain/nausea and lethargy/confusion  · CT abdomen/pelvis on admission - bowel wall thickening involving the proximal to mid sigmoid and the distal left colon  This may be due to colitis  · Stool cdiff neg x2, enteric panel neg, leucocytes neg  · Checked CMV PCR - negative  · Patient afebrile, WBC normal    · Consulted GI, appreciate input  Do not suspect colitis  Infectious workup negative  Symptoms likely due to constipation/fecal stasis  Abdominal pain improved with MiraLax  GI recommend starting MiraLax daily  Outpatient colonoscopy  Follow-up GI in 1 month  GI signed off

## 2019-12-27 NOTE — ASSESSMENT & PLAN NOTE
Lab Results   Component Value Date    HGBA1C 5 1 09/09/2019       Recent Labs     12/26/19  1059 12/26/19  1630 12/26/19  2038 12/27/19  0731   POCGLU 125 125 129 116       Blood Sugar Average: Last 72 hrs:  · (P) 522 [id number] stopped Lantus (in place of Toujeo) 5 units qHS due to hypoglycemia  · Cont SSI with accu-cheks and carb controlled diet  · Initiate hypoglycemia protocol and avoid hypoglycemia  · Repeat A1c

## 2019-12-27 NOTE — ASSESSMENT & PLAN NOTE
Blood pressures have remained variable  Patient currently on clonidine t i d  , hydralazine Q 8, And Cardura, metoprolol  Norvasc was discontinued, now resumed  Continue to monitor  Added spironolactone  One dose of torsemide on 12/24  Received albumin and Lasix on 12/25 and yesterday  Monitor

## 2019-12-27 NOTE — ASSESSMENT & PLAN NOTE
Likely secondary to constipation/fecal stasis  Stool culture, C diff negative  Consulted GI, appreciate input  Patient finished half bottle of MiraLax yesterday with multiple soft bowel movement  Reports feeling better today  Started on MiraLax per GI  Titrate to at least 1 bowel movement every other day and okay to hold for >BM/day  Check OBS today

## 2019-12-27 NOTE — ASSESSMENT & PLAN NOTE
ProBNP 20,640  2+ edema in lower extremity  Venous Doppler negative for DVT  2D echo - normal EF,G1DD,no RWMA, moderate aortic regurgitation, trace pericardial effusion  Received 1 dose of torsemide on 12/24 per Nephrology  Received albumin plus IV Lasix past 2 days per Nephrology  Decreased Norvasc to 5 mg p o  Daily per Nephrology  Continue metoprolol  Daily weight and I&Os  Appreciate Nephrology input

## 2019-12-27 NOTE — ASSESSMENT & PLAN NOTE
· Baseline creatinine 2 5-3  · Creatinine stable  · Monitor volume status, not all UO documented  · Monitor BMP while inpatient  · Received one dose of torsemide since admission, received albumin and Lasix IV past 2 days per Nephrology  · Avoid nephrotoxins as able, renally dose medications as indicated  · Appreciate Nephrology input  Now on p o  Bicarb and off bicarb infusion  · Spironolactone added due to hypokalemia  Consider discontinuing spironolactone if creatinine continues to trend up

## 2019-12-27 NOTE — ASSESSMENT & PLAN NOTE
· Appears acute on chronic  · Bicarb 20 today  · S/P kidney and pancreatic transplant in 1998  · continue p o  Sodium bicarb and trend BMP  · Patient with an RTA related to her pancreatic transplant per Nephrology  · P o  Bicarb supplementation as per Nephrology, appreciate input

## 2019-12-27 NOTE — ASSESSMENT & PLAN NOTE
· Follows with Dr Haris Capellan as outpatient  · Continue Revlimid at home dosage  Currently off Revlimid  · Hemoglobin stable between 7-8  · Known history of transfusions in past   Has multiple autoantibodies

## 2019-12-28 LAB
ANION GAP SERPL CALCULATED.3IONS-SCNC: 8 MMOL/L (ref 4–13)
BUN SERPL-MCNC: 39 MG/DL (ref 5–25)
CALCIUM SERPL-MCNC: 8.9 MG/DL (ref 8.3–10.1)
CHLORIDE SERPL-SCNC: 114 MMOL/L (ref 100–108)
CO2 SERPL-SCNC: 19 MMOL/L (ref 21–32)
CREAT SERPL-MCNC: 2.77 MG/DL (ref 0.6–1.3)
GFR SERPL CREATININE-BSD FRML MDRD: 19 ML/MIN/1.73SQ M
GLUCOSE SERPL-MCNC: 102 MG/DL (ref 65–140)
GLUCOSE SERPL-MCNC: 107 MG/DL (ref 65–140)
GLUCOSE SERPL-MCNC: 111 MG/DL (ref 65–140)
GLUCOSE SERPL-MCNC: 112 MG/DL (ref 65–140)
GLUCOSE SERPL-MCNC: 96 MG/DL (ref 65–140)
MAGNESIUM SERPL-MCNC: 2.6 MG/DL (ref 1.6–2.6)
PHOSPHATE SERPL-MCNC: 4.9 MG/DL (ref 2.7–4.5)
POTASSIUM SERPL-SCNC: 4.4 MMOL/L (ref 3.5–5.3)
SODIUM SERPL-SCNC: 141 MMOL/L (ref 136–145)

## 2019-12-28 PROCEDURE — 84100 ASSAY OF PHOSPHORUS: CPT | Performed by: NURSE PRACTITIONER

## 2019-12-28 PROCEDURE — 99232 SBSQ HOSP IP/OBS MODERATE 35: CPT | Performed by: INTERNAL MEDICINE

## 2019-12-28 PROCEDURE — 80048 BASIC METABOLIC PNL TOTAL CA: CPT | Performed by: NURSE PRACTITIONER

## 2019-12-28 PROCEDURE — 83735 ASSAY OF MAGNESIUM: CPT | Performed by: NURSE PRACTITIONER

## 2019-12-28 PROCEDURE — 83036 HEMOGLOBIN GLYCOSYLATED A1C: CPT | Performed by: NURSE PRACTITIONER

## 2019-12-28 PROCEDURE — 82948 REAGENT STRIP/BLOOD GLUCOSE: CPT

## 2019-12-28 PROCEDURE — 99232 SBSQ HOSP IP/OBS MODERATE 35: CPT | Performed by: PHYSICIAN ASSISTANT

## 2019-12-28 RX ORDER — ALBUMIN (HUMAN) 12.5 G/50ML
12.5 SOLUTION INTRAVENOUS ONCE
Status: COMPLETED | OUTPATIENT
Start: 2019-12-28 | End: 2019-12-28

## 2019-12-28 RX ORDER — FUROSEMIDE 10 MG/ML
40 INJECTION INTRAMUSCULAR; INTRAVENOUS ONCE
Status: COMPLETED | OUTPATIENT
Start: 2019-12-28 | End: 2019-12-28

## 2019-12-28 RX ADMIN — DULOXETINE HYDROCHLORIDE 60 MG: 60 CAPSULE, DELAYED RELEASE ORAL at 09:04

## 2019-12-28 RX ADMIN — BUSPIRONE HYDROCHLORIDE 5 MG: 5 TABLET ORAL at 16:34

## 2019-12-28 RX ADMIN — TACROLIMUS 2 MG: 1 CAPSULE ORAL at 09:06

## 2019-12-28 RX ADMIN — LEVOTHYROXINE SODIUM 125 MCG: 125 TABLET ORAL at 07:37

## 2019-12-28 RX ADMIN — ROPINIROLE 0.25 MG: 0.25 TABLET, FILM COATED ORAL at 16:34

## 2019-12-28 RX ADMIN — ROPINIROLE 0.25 MG: 0.25 TABLET, FILM COATED ORAL at 09:05

## 2019-12-28 RX ADMIN — SODIUM BICARBONATE 650 MG TABLET 1300 MG: at 09:04

## 2019-12-28 RX ADMIN — FERROUS SULFATE TAB 325 MG (65 MG ELEMENTAL FE) 325 MG: 325 (65 FE) TAB at 09:04

## 2019-12-28 RX ADMIN — SERTRALINE HYDROCHLORIDE 200 MG: 100 TABLET ORAL at 09:04

## 2019-12-28 RX ADMIN — HYDRALAZINE HYDROCHLORIDE 50 MG: 50 TABLET, FILM COATED ORAL at 22:49

## 2019-12-28 RX ADMIN — MELATONIN 3000 UNITS: at 09:04

## 2019-12-28 RX ADMIN — AMLODIPINE BESYLATE 5 MG: 5 TABLET ORAL at 09:04

## 2019-12-28 RX ADMIN — CLONIDINE HYDROCHLORIDE 0.2 MG: 0.1 TABLET ORAL at 07:38

## 2019-12-28 RX ADMIN — CLONIDINE HYDROCHLORIDE 0.2 MG: 0.1 TABLET ORAL at 22:50

## 2019-12-28 RX ADMIN — POLYETHYLENE GLYCOL 3350 34 G: 17 POWDER, FOR SOLUTION ORAL at 09:05

## 2019-12-28 RX ADMIN — PREDNISONE 5 MG: 5 TABLET ORAL at 09:05

## 2019-12-28 RX ADMIN — BUSPIRONE HYDROCHLORIDE 5 MG: 5 TABLET ORAL at 09:05

## 2019-12-28 RX ADMIN — METOPROLOL TARTRATE 50 MG: 50 TABLET, FILM COATED ORAL at 09:05

## 2019-12-28 RX ADMIN — FUROSEMIDE 40 MG: 10 INJECTION, SOLUTION INTRAMUSCULAR; INTRAVENOUS at 08:45

## 2019-12-28 RX ADMIN — TACROLIMUS 2 MG: 1 CAPSULE ORAL at 20:46

## 2019-12-28 RX ADMIN — Medication 250 MG: at 16:33

## 2019-12-28 RX ADMIN — ASPIRIN 81 MG 81 MG: 81 TABLET ORAL at 09:05

## 2019-12-28 RX ADMIN — HYDRALAZINE HYDROCHLORIDE 50 MG: 50 TABLET, FILM COATED ORAL at 14:35

## 2019-12-28 RX ADMIN — SEVELAMER HYDROCHLORIDE 1600 MG: 800 TABLET, FILM COATED PARENTERAL at 13:01

## 2019-12-28 RX ADMIN — METOPROLOL TARTRATE 50 MG: 50 TABLET, FILM COATED ORAL at 20:46

## 2019-12-28 RX ADMIN — FOLIC ACID 1000 MCG: 1 TABLET ORAL at 09:04

## 2019-12-28 RX ADMIN — DULOXETINE HYDROCHLORIDE 60 MG: 60 CAPSULE, DELAYED RELEASE ORAL at 16:34

## 2019-12-28 RX ADMIN — PRAVASTATIN SODIUM 80 MG: 80 TABLET ORAL at 09:04

## 2019-12-28 RX ADMIN — SEVELAMER HYDROCHLORIDE 1600 MG: 800 TABLET, FILM COATED PARENTERAL at 09:04

## 2019-12-28 RX ADMIN — CLONIDINE HYDROCHLORIDE 0.2 MG: 0.1 TABLET ORAL at 14:35

## 2019-12-28 RX ADMIN — SODIUM BICARBONATE 650 MG TABLET 1300 MG: at 16:33

## 2019-12-28 RX ADMIN — ALBUMIN (HUMAN) 12.5 G: 25 SOLUTION INTRAVENOUS at 08:00

## 2019-12-28 RX ADMIN — Medication 250 MG: at 09:05

## 2019-12-28 RX ADMIN — SPIRONOLACTONE 25 MG: 25 TABLET ORAL at 09:04

## 2019-12-28 RX ADMIN — HYDRALAZINE HYDROCHLORIDE 50 MG: 50 TABLET, FILM COATED ORAL at 07:38

## 2019-12-28 RX ADMIN — DOXAZOSIN 2 MG: 2 TABLET ORAL at 22:50

## 2019-12-28 RX ADMIN — SEVELAMER HYDROCHLORIDE 1600 MG: 800 TABLET, FILM COATED PARENTERAL at 16:34

## 2019-12-28 RX ADMIN — ARIPIPRAZOLE 30 MG: 10 TABLET ORAL at 22:50

## 2019-12-28 NOTE — ASSESSMENT & PLAN NOTE
· Blood pressures have remained variable  Patient currently on clonidine t i d  , hydralazine Q 8, And Cardura, metoprolol  Norvasc was discontinued, now resumed  Continue to monitor  · Added spironolactone  · One dose of torsemide on 12/24  · Received albumin and Lasix past 4 days   · Monitor

## 2019-12-28 NOTE — ASSESSMENT & PLAN NOTE
With history of recurrent UTIs in setting of renal and pancreatic transplant  Most recently with VRE at recent admission to [address] [place] [address]  Presenting with abdominal pain/nausea and lethargy/confusion  · CT abdomen/pelvis on admission - bowel wall thickening involving the proximal to mid sigmoid and the distal left colon  This may be due to colitis  · Stool cdiff neg x2, enteric panel neg, leucocytes neg  · Checked CMV PCR - negative  · Patient afebrile, WBC normal    · Consulted GI -  Do not suspect colitis  Likely due to constipation/fecal stasis  Abdominal pain improved with MiraLax  GI recommend starting MiraLax daily  Outpatient colonoscopy  Follow-up GI in 1 month  GI signed off

## 2019-12-28 NOTE — ASSESSMENT & PLAN NOTE
· Likely secondary to constipation/fecal stasis  · Stool culture, C diff negative  · Consulted GI, appreciate input - see above   · Started on MiraLax per GI  Titrate to at least 1 bowel movement every other day and okay to hold for >BM/day

## 2019-12-28 NOTE — PROGRESS NOTES
[facility]     Progress Note - Scottie Hernandez 1965, 53 y o  female MRN: [id number]    Unit/Bed#: -[facility] Encounter: [id number]    Primary Care Provider: Kendy Luciano MD   Date and time admitted to hospital: 12/9/2019  8:29 PM        * Abdominal pain  Assessment & Plan  With history of recurrent UTIs in setting of renal and pancreatic transplant  Most recently with VRE at recent admission to [facility]  Presenting with abdominal pain/nausea and lethargy/confusion  · CT abdomen/pelvis on admission - bowel wall thickening involving the proximal to mid sigmoid and the distal left colon  This may be due to colitis  · Stool cdiff neg x2, enteric panel neg, leucocytes neg  · Checked CMV PCR - negative  · Patient afebrile, WBC normal    · Consulted GI -  Do not suspect colitis  Likely due to constipation/fecal stasis  Abdominal pain improved with MiraLax  GI recommend starting MiraLax daily  Outpatient colonoscopy  Follow-up GI in 1 month  GI signed off  Diarrhea  Assessment & Plan  · Likely secondary to constipation/fecal stasis  · Stool culture, C diff negative  · Consulted GI, appreciate input - see above   · Started on MiraLax per GI  Titrate to at least 1 bowel movement every other day and okay to hold for >BM/day  Renal transplant recipient  Assessment & Plan  · On chronic immunosuppression with tacrolimus and prednisone  · Unfortunately patient noncompliant with all medications since discharge including these  · Tacrolimus level was 4 > 10 > 7 (12/12) > 5 (12/14) remains stable at 4 4  · Dose decreased to 2 BID on 12/12, again decrease to 2 mg QAM & 1 5 mg HS from 12/14  · Tacrolimus level was stable on the current dosing  · Last level 4 5    Acute on chronic diastolic congestive heart failure (HCC)  Assessment & Plan  · ProBNP 20,640  2+ edema in lower extremity  · Venous Doppler negative for DVT    · 2D echo - normal EF,G1DD,no RWMA, moderate aortic regurgitation, trace pericardial effusion  · Received 1 dose of torsemide on 12/24 per Nephrology  · Received albumin plus IV Lasix past 4 days per Nephrology  · Decreased Norvasc to 5 mg p o  Daily per Nephrology  · Continue metoprolol  · Daily weight and I&Os  · Appreciate Nephrology input  Chronic kidney disease, stage 4 (severe) ([facility])  Assessment & Plan  · Baseline creatinine 2 5-3  · Nephrology following, appreciate input   · Received one dose of torsemide since admission, received albumin and Lasix IV past 4 days per Nephrology  · Now on p o  Bicarb and off bicarb infusion  · Spironolactone added due to hypokalemia  Consider discontinuing spironolactone if creatinine continues to trend up  · Avoid nephrotoxins as able, renally dose medications as indicated  · BMP daily   · Cr at baseline today    Metabolic acidosis  Assessment & Plan  · Appears acute on chronic  · Bicarb 19 today  · S/P kidney and pancreatic transplant in 1998  · Continue p o  Sodium bicarb and trend BMP  · Patient with an RTA related to her pancreatic transplant per Nephrology  · P o  Bicarb supplementation as per Nephrology, appreciate input  Bacteremia  Assessment & Plan  · Final cultures Grew Porphyromonas asaccharolytica  · ID has been monitoring off antibiotics  · Pt remains afebrile w/o WBC  · Continue to monitor     Asymptomatic bacteriuria  Assessment & Plan  · Recently completed 7 D course of IV daptomycin for VRE UTI  Monitoring off antibiotic therapy  · ID consulted   · Has positive cultures for VRE again, but this is not the cause of her symptoms, its colonization  · Monitor off Abx      Multiple myeloma not having achieved remission [facility])  Assessment & Plan  · Follows with Dr Haris Capellan as outpatient  · Continue Revlimid at home dosage  Currently off Revlimid  · Hemoglobin stable between 7-8  · Known history of transfusions in past   Has multiple autoantibodies      Essential hypertension  Assessment & Plan  · Blood pressures have remained variable  Patient currently on clonidine t i d  , hydralazine Q 8, And Cardura, metoprolol  Norvasc was discontinued, now resumed  Continue to monitor  · Added spironolactone  · One dose of torsemide on 12/24  · Received albumin and Lasix past 4 days   · Monitor  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 [facility])  Assessment & Plan  Lab Results   Component Value Date    HGBA1C 5 1 09/09/2019       Recent Labs     12/27/19  0731 12/27/19  1623 12/27/19  2109 12/28/19  0724   POCGLU 116 132 101 107       Blood Sugar Average: Last 72 hrs:  · Stopped Lantus (in place of Toujeo) 5 units qHS due to hypoglycemia  · Cont SSI with accu-cheks and carb controlled diet  · Initiate hypoglycemia protocol and avoid hypoglycemia  · Repeat A1c    VTE Pharmacologic Prophylaxis:   Pharmacologic: Held for significant ecchymosis and bleeding from injection site yesterday   Mechanical VTE Prophylaxis in Place: No    Patient Centered Rounds: I have performed bedside rounds with nursing staff today  Discussions with Specialists or Other Care Team Provider: Nursing, CM  Reviewed nephrology notes     Education and Discussions with Family / Patient: Discussed plan with patient  Pt agreeable  All questions answered  Time Spent for Care: 30 minutes  More than 50% of total time spent on counseling and coordination of care as described above  Current Length of Stay: 18 day(s)    Current Patient Status: Inpatient   Certification Statement: The patient will continue to require additional inpatient hospital stay due to IV duiretics     Discharge Plan: acute rehab pending IV diuretics  Code Status: Level 1 - Full Code      Subjective:   Pt states she feels better than yesterday  She continues to have BM with the miralax and has less abdominal pain than before  Notes she feels "gassy" and that passing gas helps relieve pain as well  Denies n/v   Denies Cp, SOB, dizziness, fatigue, dysuria  Notes significant LE edema and tightness in the legs  No pain in legs, just describes as tight  Denies fevers at this time, has noted chills the entire admission  Objective:     Vitals:   Temp (24hrs), Av 4 °F (36 9 °C), Min:98 1 °F (36 7 °C), Max:98 5 °F (36 9 °C)    Temp:  [98 1 °F (36 7 °C)-98 5 °F (36 9 °C)] 98 5 °F (36 9 °C)  HR:  [70-72] 72  Resp:  [18] 18  BP: (162-168)/(59-66) 168/65  SpO2:  [98 %] 98 %  Body mass index is 36 14 kg/m²  Input and Output Summary (last 24 hours): Intake/Output Summary (Last 24 hours) at 2019 1112  Last data filed at 2019 0500  Gross per 24 hour   Intake 1660 ml   Output 3150 ml   Net -1490 ml       Physical Exam:     Physical Exam   Constitutional: No distress  Chronically ill appearing female in chair in NAD   Eyes: EOM are normal    Neck: Normal range of motion  No JVD present  Cardiovascular: Normal rate and regular rhythm  Murmur heard  Pulmonary/Chest: Effort normal and breath sounds normal  She has no wheezes  She has no rales  Abdominal: Soft  Bowel sounds are normal  She exhibits distension  There is tenderness  Sig Ecchymosis  TTP w/ light palpation in all areas of ecchymosis    Musculoskeletal: She exhibits edema (2+ pitting edema b/l above knees)  Neurological:   Alert and oriented    Skin: Skin is warm and dry  Capillary refill takes less than 2 seconds  She is not diaphoretic  Psychiatric: She has a normal mood and affect  Her behavior is normal    Nursing note and vitals reviewed          Additional Data:     Labs:    Results from last 7 days   Lab Units 19  0617   WBC Thousand/uL 5 41   HEMOGLOBIN g/dL 7 3*   HEMATOCRIT % 24 2*   PLATELETS Thousands/uL 157     Results from last 7 days   Lab Units 19  0501 19  0617   SODIUM mmol/L 141 141   POTASSIUM mmol/L 4 4 4 1   CHLORIDE mmol/L 114* 112*   CO2 mmol/L 19* 20*   BUN mg/dL 39* 39*   CREATININE mg/dL 2 77* 2 83*   ANION GAP mmol/L 8 9   CALCIUM mg/dL 8 9 8 5   ALBUMIN g/dL  --  2 7*   TOTAL BILIRUBIN mg/dL  --  0 34   ALK PHOS U/L  --  89   ALT U/L  --  13   AST U/L  --  9   GLUCOSE RANDOM mg/dL 96 108         Results from last 7 days   Lab Units 12/28/19  0724 12/27/19  2109 12/27/19  1623 12/27/19  0731 12/26/19  2038 12/26/19  1630 12/26/19  1059 12/26/19  0700 12/25/19  2102 12/25/19  1625 12/25/19  1050 12/25/19  0631   POC GLUCOSE mg/dl 107 101 132 116 129 125 125 107 102 158* 116 96                   * I Have Reviewed All Lab Data Listed Above  * Additional Pertinent Lab Tests Reviewed: All Labs For Current Hospital Admission Reviewed    Imaging:    Imaging Reports Reviewed Today Include:  All for admission    Imaging Personally Reviewed by Myself Includes:  None     Recent Cultures (last 7 days):           Last 24 Hours Medication List:     Current Facility-Administered Medications:  acetaminophen 650 mg Oral Q6H PRN Kourtney Ortiz PA-C   aluminum-magnesium hydroxide-simethicone 30 mL Oral Q4H PRN Kourtney Ortiz PA-C   amLODIPine 5 mg Oral Daily Ayah Messina MD   ARIPiprazole 30 mg Oral HS [place] Arthur, DO   aspirin 81 mg Oral Daily [place] Arthur, DO   busPIRone 5 mg Oral BID [place] Arthur, DO   cholecalciferol 3,000 Units Oral Daily [place] Arthur, DO   cloNIDine 0 2 mg Oral [facility] Ayah Messina MD   doxazosin 2 mg Oral HS Gavin Acevedo MD   DULoxetine 60 mg Oral BID [place] Arthur, DO   ferrous sulfate 325 mg Oral Daily With Breakfast [place] Arthur, DO   folic acid 4,788 mcg Oral Daily [place] Arthur, DO   hydrALAZINE 5 mg Intravenous Q6H PRN Denys Graf MD   hydrALAZINE 50 mg Oral Q8H [address] Gavin Acevedo MD   insulin lispro 1-5 Units Subcutaneous HS [place] Arthur, DO   insulin lispro 1-6 Units Subcutaneous TID AC Denys Graf MD   levothyroxine 125 mcg Oral Early Morning [place] Arthur, DO   LORazepam 2 mg Oral TID PRN [place] Arthur, DO   metoprolol tartrate 50 mg Oral Q12H [facility] Mayelin, DO   ondansetron 4 mg Intravenous Q4H PRN Carrington Luo MD   polyethylene glycol 34 g Oral Daily Loretta Hollingsworth MD   pravastatin 80 mg Oral Daily [place] Devoid, DO   predniSONE 5 mg Oral Daily [place] Devoid, DO   rOPINIRole 0 25 mg Oral BID [place] Devoid, DO   saccharomyces boulardii 250 mg Oral BID [place] Devoid, DO   sertraline 200 mg Oral Daily [place] Devoid, DO   sevelamer 1,600 mg Oral TID With Meals MER Campos   sodium bicarbonate 1,300 mg Oral BID after meals Sofya Drake MD   spironolactone 25 mg Oral Daily Connor Marques MD   tacrolimus 2 mg Oral Daily Saemus Fu,    tacrolimus 2 mg Oral HS Sofya Drake MD        Today, Patient Was Seen By: Tiffany Arias PA-C    ** Please Note: Dictation voice to text software may have been used in the creation of this document   **

## 2019-12-28 NOTE — ASSESSMENT & PLAN NOTE
Lab Results   Component Value Date    HGBA1C 5 1 09/09/2019       Recent Labs     12/27/19  0731 12/27/19  1623 12/27/19  2109 12/28/19  0724   POCGLU 116 132 101 107       Blood Sugar Average: Last 72 hrs:  · Stopped Lantus (in place of Toujeo) 5 units qHS due to hypoglycemia  · Cont SSI with accu-cheks and carb controlled diet  · Initiate hypoglycemia protocol and avoid hypoglycemia  · Repeat A1c

## 2019-12-28 NOTE — ASSESSMENT & PLAN NOTE
· Baseline creatinine 2 5-3  · Nephrology following, appreciate input   · Received one dose of torsemide since admission, received albumin and Lasix IV past 4 days per Nephrology  · Now on p o  Bicarb and off bicarb infusion  · Spironolactone added due to hypokalemia  Consider discontinuing spironolactone if creatinine continues to trend up    · Avoid nephrotoxins as able, renally dose medications as indicated  · BMP daily   · Cr at baseline today

## 2019-12-28 NOTE — ASSESSMENT & PLAN NOTE
· Appears acute on chronic  · Bicarb 19 today  · S/P kidney and pancreatic transplant in 1998  · Continue p o  Sodium bicarb and trend BMP  · Patient with an RTA related to her pancreatic transplant per Nephrology  · P o  Bicarb supplementation as per Nephrology, appreciate input

## 2019-12-28 NOTE — ASSESSMENT & PLAN NOTE
· Follows with Dr Wagner Running as outpatient  · Continue Revlimid at home dosage  Currently off Revlimid  · Hemoglobin stable between 7-8  · Known history of transfusions in past   Has multiple autoantibodies

## 2019-12-28 NOTE — PROGRESS NOTES
NEPHROLOGY PROGRESS NOTE   Violet Small 53 y o  female MRN: [id number]  Unit/Bed#: -01 Encounter: [id number]  Reason for Consult: NICHO/CKD    ASSESSMENT AND PLAN:  Initial Nicho on CKD stage 4, baseline creatinine seems to be 2 5 to 3 0  -NICHO initially thought to be secondary to prerenal  -peak creatinine 3 3 initially improved and creatinine seems to have plateaued around 2 7 to 2 8  Creatinine 2 7 today    -if creatinine continued to worsen, may consider holding Aldactone for time being  -BMP in a m      Status post kidney/pancreatic transplant  -currently on prednisone and tacrolimus, last tacrolimus level 4 5 on 12/21/19  -continue current immunosuppressive regimen     Hypertension, blood pressure slightly above goal although continue to monitor on current regimen with ongoing aggressive diuresis  continue to closely monitor, avoid low BP      Leg edema  -patient denies any respiratory distress, remains on room air, leg edema seems to be slowly improving with aggressive diuresis  prior echo shows EF 48%, grade 1 diastolic dysfunction  -will give again IV Lasix 40 mg and IV albumin 12 5 g once today  May consider keeping her on standing dose of p o  Diuretics in next 24 to 48 hours  Weight seems to be stable and not reducing significantly   -venous Doppler shows no evidence of DVT  -compression stockings as tolerated, keep legs elevated    Hyperphosphatemia, continue Renagel, serum phosphorus slowly improving and at goal      Low bicarb, overall stable but slightly below goal   Bicarb level 19 today  Continue current bicarb supplement  Discussed above plan with primary team    SUBJECTIVE:  Patient seen and examined at bedside  She still complains of leg edema although this seems to be overall improving  No chest pain, shortness of breath, nausea, vomiting, abdominal pain or diarrhea  No urinary complaints       OBJECTIVE:  Current Weight: Weight - Scale: 108 kg (238 lb 5 1 oz)  Vitals:    12/27/19 2206   BP: 167/66   Pulse: 70   Resp:    Temp: 98 4 °F (36 9 °C)   SpO2: 98%       Intake/Output Summary (Last 24 hours) at 12/28/2019 0654  Last data filed at 12/28/2019 0500  Gross per 24 hour   Intake 1900 ml   Output 3550 ml   Net -1650 ml     Wt Readings from Last 3 Encounters:   12/27/19 108 kg (238 lb 5 1 oz)   12/06/19 107 kg (235 lb 7 2 oz)   11/12/19 105 kg (232 lb)     Temp Readings from Last 3 Encounters:   12/27/19 98 4 °F (36 9 °C)   12/06/19 97 9 °F (36 6 °C) (Oral)   11/12/19 98 7 °F (37 1 °C)     BP Readings from Last 3 Encounters:   12/27/19 167/66   12/06/19 168/69   11/22/19 148/82     Pulse Readings from Last 3 Encounters:   12/27/19 70   12/06/19 62   11/12/19 86        Physical Examination:  General:  Sitting in chair, no acute distress   Eyes:  Mild conjunctival pallor present  ENT:  External examination of ears and nose unremarkable  Neck:  No obvious lymphadenopathy appreciated  Respiratory:  Bilateral air entry present  CVS:  S1, S2 present  GI:  Soft, nontender, nondistended  CNS:  Active alert oriented x3  Extremities:  Trace to 1+ edema in both legs, improving  Skin:  No new rash in legs    Medications:    Current Facility-Administered Medications:     acetaminophen (TYLENOL) tablet 650 mg, 650 mg, Oral, Q6H PRN, JODI Hi-C, 650 mg at 12/20/19 0531    aluminum-magnesium hydroxide-simethicone (MYLANTA) 200-200-20 mg/5 mL oral suspension 30 mL, 30 mL, Oral, Q4H PRN, Kourtney Ortiz PA-C, 30 mL at 12/11/19 0148    amLODIPine (NORVASC) tablet 5 mg, 5 mg, Oral, Daily, Boni Leigh MD, 5 mg at 12/27/19 0912    ARIPiprazole (ABILIFY) tablet 30 mg, 30 mg, Oral, HS, [place] Alias, DO, 30 mg at 12/27/19 4743    aspirin chewable tablet 81 mg, 81 mg, Oral, Daily, [place] Alias, DO, 81 mg at 12/27/19 0910    busPIRone (BUSPAR) tablet 5 mg, 5 mg, Oral, BID, Regina Melgar DO, 5 mg at 12/27/19 1746    cholecalciferol (VITAMIN D3) tablet 3,000 Units, 3,000 Units, Oral, Daily, Franca Tan DO [place], 3,000 Units at 12/27/19 0912    cloNIDine (CATAPRES) tablet 0 2 mg, 0 2 mg, Oral, Q8H [address], Nevaeh Koenig MD, 0 2 mg at 12/27/19 2256    doxazosin (CARDURA) tablet 2 mg, 2 mg, Oral, HS, Jorge Crystal MD, 2 mg at 12/27/19 2255    DULoxetine (CYMBALTA) delayed release capsule 60 mg, 60 mg, Oral, BID, Prosper Moreno DO, 60 mg at 12/27/19 1745    ferrous sulfate tablet 325 mg, 325 mg, Oral, Daily With Breakfast, Prosper Moreno DO, 325 mg at 48/86/28 2028    folic acid (FOLVITE) tablet 1,000 mcg, 1,000 mcg, Oral, Daily, Prosper Moreno DO, 1,000 mcg at 12/27/19 0911    hydrALAZINE (APRESOLINE) injection 5 mg, 5 mg, Intravenous, Q6H PRN, Ramona Oakley MD, 5 mg at 12/16/19 1733    hydrALAZINE (APRESOLINE) tablet 50 mg, 50 mg, Oral, Q8H [address], Jorge Crystal MD, 50 mg at 12/27/19 2255    insulin lispro (HumaLOG) 100 units/mL subcutaneous injection 1-5 Units, 1-5 Units, Subcutaneous, HS, Prosper Moreno DO    insulin lispro (HumaLOG) 100 units/mL subcutaneous injection 1-6 Units, 1-6 Units, Subcutaneous, TID AC, 1 Units at 12/25/19 1657 **AND** Fingerstick Glucose (POCT), , , 4x Daily AC and at bedtime, Ramona Oakley MD    levothyroxine tablet 125 mcg, 125 mcg, Oral, Early Morning, Prosper Moreno DO, 125 mcg at 12/27/19 1986    LORazepam (ATIVAN) tablet 2 mg, 2 mg, Oral, TID PRN, Prosper Moreno DO, 2 mg at 12/20/19 1305    metoprolol tartrate (LOPRESSOR) tablet 50 mg, 50 mg, Oral, Q12H [address], Prosper Moreno DO, 50 mg at 12/27/19 2036    ondansetron (ZOFRAN) injection 4 mg, 4 mg, Intravenous, Q4H PRN, Sharlene Ortiz MD, 4 mg at 12/22/19 2101    polyethylene glycol (MIRALAX) packet 34 g, 34 g, Oral, Daily, Janell Leong MD    pravastatin (PRAVACHOL) tablet 80 mg, 80 mg, Oral, Daily, Prosper Moreno DO, 80 mg at 12/27/19 0910    predniSONE tablet 5 mg, 5 mg, Oral, Daily, Prosper Moreno DO, 5 mg at 12/27/19 0911    rOPINIRole (REQUIP) tablet 0 25 mg, 0 25 mg, Oral, BID, Laith Marshall DO, 0 25 mg at 12/27/19 1746    saccharomyces boulardii (FLORASTOR) capsule 250 mg, 250 mg, Oral, BID, Laith Marshall DO    sertraline (ZOLOFT) tablet 200 mg, 200 mg, Oral, Daily, Laith Marshall DO, 200 mg at 12/27/19 0912    sevelamer (RENAGEL) tablet 1,600 mg, 1,600 mg, Oral, TID With Meals, MER Campos, 1,600 mg at 12/27/19 1745    sodium bicarbonate tablet 1,300 mg, 1,300 mg, Oral, BID after meals, Jess Miller MD, 1,300 mg at 12/27/19 1745    spironolactone (ALDACTONE) tablet 25 mg, 25 mg, Oral, Daily, Birdie Monae MD, 25 mg at 12/27/19 0913    tacrolimus (PROGRAF) capsule 2 mg, 2 mg, Oral, Daily, Seamus Fu DO, 2 mg at 12/27/19 0915    tacrolimus (PROGRAF) capsule 2 mg, 2 mg, Oral, HS, Jess Miller MD, 2 mg at 12/27/19 2036    Laboratory Results:  Results from last 7 days   Lab Units 12/28/19  0501 12/27/19  0617 12/26/19  0428 12/26/19  6311 12/25/19  0748 12/24/19  0518 12/23/19  0438 12/22/19  0602 12/21/19  0918   WBC Thousand/uL  --  5 41  --  7 76 6 10  --  7 89  --  8 54   HEMOGLOBIN g/dL  --  7 3*  --  7 2* 7 4*  --  7 4*  --  7 6*   HEMATOCRIT %  --  24 2*  --  23 0* 23 8*  --  23 8*  --  24 7*   PLATELETS Thousands/uL  --  157  --  157 156  --  158  --  163   SODIUM mmol/L 141 141 141  --  142 140 142 141 142   POTASSIUM mmol/L 4 4 4 1 3 8  --  4 0 4 0 3 3* 3 4* 3 3*   CHLORIDE mmol/L 114* 112* 113*  --  114* 115* 117* 119* 119*   CO2 mmol/L 19* 20* 21  --  20* 18* 20* 17* 15*   BUN mg/dL 39* 39* 40*  --  34* 32* 32* 30* 32*   CREATININE mg/dL 2 77* 2 83* 2 87*  --  2 94* 2 76* 2 61* 2 52* 2 70*   CALCIUM mg/dL 8 9 8 5 8 2*  --  8 3 8 1* 8 1* 8 3 8 0*   MAGNESIUM mg/dL 2 6 2 8*  --   --  2 6 2 7*  --   --  2 4   PHOSPHORUS mg/dL 4 9* 5 2*  --   --  5 0* 4 4  --   --  5 0*       XR abdomen obstruction series   Final Result by Mane Vale MD (12/27 5701)      Nonobstructive bowel gas pattern with decreased colonic stool burden           Workstation performed: [id number]         VAS lower limb venous duplex study, complete bilateral   Final Result by Bakari Armendariz MD (12/24 3673)      XR abdomen obstruction series   Final Result by Leora Bal MD (12/18 1641)   1  Increased colorectal stool without obstruction or free air  2  Cardiomegaly  Workstation performed: [id number]         CT abdomen pelvis wo contrast   Final Result by Jm Parker MD (12/10 0481)      There is bowel wall thickening involving the proximal to mid sigmoid and the distal left colon  This may be due to colitis  Clinical and laboratory correlation is recommended  Follow-up after treatment is recommended  There is a small amount of ascites, increased slightly compared to the prior study  There are small bilateral pleural effusions, larger compared to the prior study  There is a pericardial effusion, this appears similar to the prior study  There is    some infiltration of the subcutaneous fat suggesting a degree of anasarca  The native kidneys are markedly atrophic bilaterally  A small right renal cyst appears unchanged  A transplant kidney is again evident within the left iliac fossa  There is some nonspecific perinephric stranding adjacent to the transplant kidney,    similar to the previous examination  There is no hydronephrosis  A urinary bladder diverticulum containing calculi and possibly surgical sutures appears similar to the prior study  Workstation performed: [id number]             Portions of the record may have been created with voice recognition software  Occasional wrong word or "sound a like" substitutions may have occurred due to the inherent limitations of voice recognition software  Read the chart carefully and recognize, using context, where substitutions have occurred

## 2019-12-28 NOTE — ASSESSMENT & PLAN NOTE
· ProBNP 20,640  2+ edema in lower extremity  · Venous Doppler negative for DVT  · 2D echo - normal EF,G1DD,no RWMA, moderate aortic regurgitation, trace pericardial effusion  · Received 1 dose of torsemide on 12/24 per Nephrology  · Received albumin plus IV Lasix past 4 days per Nephrology  · Decreased Norvasc to 5 mg p o  Daily per Nephrology  · Continue metoprolol  · Daily weight and I&Os  · Appreciate Nephrology input

## 2019-12-28 NOTE — ASSESSMENT & PLAN NOTE
· Final cultures Grew Porphyromonas asaccharolytica  · ID has been monitoring off antibiotics      · Pt remains afebrile w/o WBC  · Continue to monitor

## 2019-12-28 NOTE — ASSESSMENT & PLAN NOTE
· Recently completed 7 D course of IV daptomycin for VRE UTI    Monitoring off antibiotic therapy  · ID consulted   · Has positive cultures for VRE again, but this is not the cause of her symptoms, its colonization  · Monitor off Abx

## 2019-12-29 LAB
ANION GAP SERPL CALCULATED.3IONS-SCNC: 6 MMOL/L (ref 4–13)
BASOPHILS # BLD AUTO: 0.06 THOUSANDS/ΜL (ref 0–0.1)
BASOPHILS NFR BLD AUTO: 1 % (ref 0–1)
BUN SERPL-MCNC: 40 MG/DL (ref 5–25)
CALCIUM SERPL-MCNC: 8.7 MG/DL (ref 8.3–10.1)
CHLORIDE SERPL-SCNC: 114 MMOL/L (ref 100–108)
CO2 SERPL-SCNC: 21 MMOL/L (ref 21–32)
CREAT SERPL-MCNC: 2.77 MG/DL (ref 0.6–1.3)
EOSINOPHIL # BLD AUTO: 0.53 THOUSAND/ΜL (ref 0–0.61)
EOSINOPHIL NFR BLD AUTO: 9 % (ref 0–6)
ERYTHROCYTE [DISTWIDTH] IN BLOOD BY AUTOMATED COUNT: 17.5 % (ref 11.6–15.1)
GFR SERPL CREATININE-BSD FRML MDRD: 19 ML/MIN/1.73SQ M
GLUCOSE SERPL-MCNC: 101 MG/DL (ref 65–140)
GLUCOSE SERPL-MCNC: 105 MG/DL (ref 65–140)
GLUCOSE SERPL-MCNC: 127 MG/DL (ref 65–140)
GLUCOSE SERPL-MCNC: 129 MG/DL (ref 65–140)
GLUCOSE SERPL-MCNC: 187 MG/DL (ref 65–140)
HCT VFR BLD AUTO: 23.6 % (ref 34.8–46.1)
HGB BLD-MCNC: 7.2 G/DL (ref 11.5–15.4)
IMM GRANULOCYTES # BLD AUTO: 0.07 THOUSAND/UL (ref 0–0.2)
IMM GRANULOCYTES NFR BLD AUTO: 1 % (ref 0–2)
LYMPHOCYTES # BLD AUTO: 1.49 THOUSANDS/ΜL (ref 0.6–4.47)
LYMPHOCYTES NFR BLD AUTO: 25 % (ref 14–44)
MCH RBC QN AUTO: 32.3 PG (ref 26.8–34.3)
MCHC RBC AUTO-ENTMCNC: 30.5 G/DL (ref 31.4–37.4)
MCV RBC AUTO: 106 FL (ref 82–98)
MONOCYTES # BLD AUTO: 0.59 THOUSAND/ΜL (ref 0.17–1.22)
MONOCYTES NFR BLD AUTO: 10 % (ref 4–12)
NEUTROPHILS # BLD AUTO: 3.13 THOUSANDS/ΜL (ref 1.85–7.62)
NEUTS SEG NFR BLD AUTO: 54 % (ref 43–75)
NRBC BLD AUTO-RTO: 0 /100 WBCS
PLATELET # BLD AUTO: 165 THOUSANDS/UL (ref 149–390)
PMV BLD AUTO: 12.8 FL (ref 8.9–12.7)
POTASSIUM SERPL-SCNC: 4.6 MMOL/L (ref 3.5–5.3)
RBC # BLD AUTO: 2.23 MILLION/UL (ref 3.81–5.12)
SODIUM SERPL-SCNC: 141 MMOL/L (ref 136–145)
WBC # BLD AUTO: 5.87 THOUSAND/UL (ref 4.31–10.16)

## 2019-12-29 PROCEDURE — 85025 COMPLETE CBC W/AUTO DIFF WBC: CPT | Performed by: PHYSICIAN ASSISTANT

## 2019-12-29 PROCEDURE — 99232 SBSQ HOSP IP/OBS MODERATE 35: CPT | Performed by: INTERNAL MEDICINE

## 2019-12-29 PROCEDURE — 80048 BASIC METABOLIC PNL TOTAL CA: CPT | Performed by: INTERNAL MEDICINE

## 2019-12-29 PROCEDURE — 82948 REAGENT STRIP/BLOOD GLUCOSE: CPT

## 2019-12-29 RX ORDER — FUROSEMIDE 10 MG/ML
40 INJECTION INTRAMUSCULAR; INTRAVENOUS
Status: COMPLETED | OUTPATIENT
Start: 2019-12-29 | End: 2019-12-29

## 2019-12-29 RX ADMIN — FUROSEMIDE 40 MG: 10 INJECTION, SOLUTION INTRAMUSCULAR; INTRAVENOUS at 15:57

## 2019-12-29 RX ADMIN — FUROSEMIDE 40 MG: 10 INJECTION, SOLUTION INTRAMUSCULAR; INTRAVENOUS at 09:43

## 2019-12-29 RX ADMIN — METOPROLOL TARTRATE 50 MG: 50 TABLET, FILM COATED ORAL at 09:41

## 2019-12-29 RX ADMIN — SEVELAMER HYDROCHLORIDE 1600 MG: 800 TABLET, FILM COATED PARENTERAL at 15:57

## 2019-12-29 RX ADMIN — Medication 250 MG: at 17:50

## 2019-12-29 RX ADMIN — DOXAZOSIN 2 MG: 2 TABLET ORAL at 23:58

## 2019-12-29 RX ADMIN — BUSPIRONE HYDROCHLORIDE 5 MG: 5 TABLET ORAL at 09:43

## 2019-12-29 RX ADMIN — SODIUM BICARBONATE 650 MG TABLET 1300 MG: at 17:51

## 2019-12-29 RX ADMIN — SERTRALINE HYDROCHLORIDE 200 MG: 100 TABLET ORAL at 09:42

## 2019-12-29 RX ADMIN — AMLODIPINE BESYLATE 5 MG: 5 TABLET ORAL at 09:43

## 2019-12-29 RX ADMIN — LEVOTHYROXINE SODIUM 125 MCG: 125 TABLET ORAL at 06:31

## 2019-12-29 RX ADMIN — CLONIDINE HYDROCHLORIDE 0.2 MG: 0.1 TABLET ORAL at 23:58

## 2019-12-29 RX ADMIN — INSULIN LISPRO 1 UNITS: 100 INJECTION, SOLUTION INTRAVENOUS; SUBCUTANEOUS at 11:34

## 2019-12-29 RX ADMIN — ARIPIPRAZOLE 30 MG: 10 TABLET ORAL at 23:57

## 2019-12-29 RX ADMIN — Medication 250 MG: at 09:42

## 2019-12-29 RX ADMIN — TACROLIMUS 2 MG: 1 CAPSULE ORAL at 09:44

## 2019-12-29 RX ADMIN — SPIRONOLACTONE 25 MG: 25 TABLET ORAL at 09:42

## 2019-12-29 RX ADMIN — ASPIRIN 81 MG 81 MG: 81 TABLET ORAL at 09:42

## 2019-12-29 RX ADMIN — PRAVASTATIN SODIUM 80 MG: 80 TABLET ORAL at 09:41

## 2019-12-29 RX ADMIN — HYDRALAZINE HYDROCHLORIDE 50 MG: 50 TABLET, FILM COATED ORAL at 13:56

## 2019-12-29 RX ADMIN — BUSPIRONE HYDROCHLORIDE 5 MG: 5 TABLET ORAL at 17:50

## 2019-12-29 RX ADMIN — HYDRALAZINE HYDROCHLORIDE 50 MG: 50 TABLET, FILM COATED ORAL at 06:31

## 2019-12-29 RX ADMIN — FOLIC ACID 1000 MCG: 1 TABLET ORAL at 09:43

## 2019-12-29 RX ADMIN — MELATONIN 3000 UNITS: at 09:41

## 2019-12-29 RX ADMIN — CLONIDINE HYDROCHLORIDE 0.2 MG: 0.1 TABLET ORAL at 13:56

## 2019-12-29 RX ADMIN — ROPINIROLE 0.25 MG: 0.25 TABLET, FILM COATED ORAL at 17:51

## 2019-12-29 RX ADMIN — FERROUS SULFATE TAB 325 MG (65 MG ELEMENTAL FE) 325 MG: 325 (65 FE) TAB at 07:40

## 2019-12-29 RX ADMIN — TACROLIMUS 2 MG: 1 CAPSULE ORAL at 20:44

## 2019-12-29 RX ADMIN — DULOXETINE HYDROCHLORIDE 60 MG: 60 CAPSULE, DELAYED RELEASE ORAL at 17:51

## 2019-12-29 RX ADMIN — SODIUM BICARBONATE 650 MG TABLET 1300 MG: at 07:40

## 2019-12-29 RX ADMIN — SEVELAMER HYDROCHLORIDE 1600 MG: 800 TABLET, FILM COATED PARENTERAL at 11:33

## 2019-12-29 RX ADMIN — SEVELAMER HYDROCHLORIDE 1600 MG: 800 TABLET, FILM COATED PARENTERAL at 07:40

## 2019-12-29 RX ADMIN — PREDNISONE 5 MG: 5 TABLET ORAL at 09:42

## 2019-12-29 RX ADMIN — ROPINIROLE 0.25 MG: 0.25 TABLET, FILM COATED ORAL at 09:42

## 2019-12-29 RX ADMIN — CLONIDINE HYDROCHLORIDE 0.2 MG: 0.1 TABLET ORAL at 06:31

## 2019-12-29 RX ADMIN — DULOXETINE HYDROCHLORIDE 60 MG: 60 CAPSULE, DELAYED RELEASE ORAL at 09:41

## 2019-12-29 RX ADMIN — METOPROLOL TARTRATE 50 MG: 50 TABLET, FILM COATED ORAL at 20:44

## 2019-12-29 RX ADMIN — HYDRALAZINE HYDROCHLORIDE 5 MG: 20 INJECTION INTRAMUSCULAR; INTRAVENOUS at 16:05

## 2019-12-29 RX ADMIN — HYDRALAZINE HYDROCHLORIDE 50 MG: 50 TABLET, FILM COATED ORAL at 23:58

## 2019-12-29 NOTE — ASSESSMENT & PLAN NOTE
· Follows with Dr Rashawn Gomez as outpatient  · Continue Revlimid at home dosage  Currently off Revlimid  · Hemoglobin stable between 7-8  · Known history of transfusions in past   Has multiple autoantibodies

## 2019-12-29 NOTE — PROGRESS NOTES
Progress Note - Chi Morales 1965, 53 y o  female MRN: [id number]    Unit/Bed#: -01 Encounter: [id number]    Primary Care Provider: Mike Gross MD   Date and time admitted to hospital: 12/9/2019  8:29 PM        Acute on chronic diastolic congestive heart failure ([address] )  Assessment & Plan  · ProBNP 20,640  2+ edema in lower extremity  · Venous Doppler negative for DVT  · 2D echo - normal EF,G1DD,no RWMA, moderate aortic regurgitation, trace pericardial effusion  · Received 1 dose of torsemide on 12/24 per Nephrology  · Received albumin plus IV Lasix per Nephrology, today on Lasix  40 mg BID, the next 24-48 hours may transition her to a standing dose of p o  Diuretics  · Decreased Norvasc to 5 mg p o  Daily per Nephrology  · Continue metoprolol  · Daily weight and I&Os  · Appreciate Nephrology input  Bacteremia  Assessment & Plan  · Final cultures Grew Porphyromonas asaccharolytica  · ID has been monitoring off antibiotics  · Pt remains afebrile w/o WBC  · Continue to monitor     Asymptomatic bacteriuria  Assessment & Plan  · Recently completed 7 D course of IV daptomycin for VRE UTI  Monitoring off antibiotic therapy  · ID consulted   · Has positive cultures for VRE again, but this is not the cause of her symptoms, its colonization  · Monitor off Abx      Diarrhea  Assessment & Plan  · Likely secondary to constipation/fecal stasis  · Stool culture, C diff negative  · Consulted GI, appreciate input - see above   · Started on MiraLax per GI  Titrate to at least 1 bowel movement every other day and okay to hold for >BM/day  Metabolic acidosis  Assessment & Plan  · Appears acute on chronic  · Bicarb 21 today  · S/P kidney and pancreatic transplant in 1998  · Continue p o  Sodium bicarb and trend BMP  · Patient with an RTA related to her pancreatic transplant per Nephrology  · P o  Bicarb supplementation as per Nephrology, appreciate input      Multiple myeloma not having achieved remission [facility])  Assessment & Plan  · Follows with Dr Tim Boucher as outpatient  · Continue Revlimid at home dosage  Currently off Revlimid  · Hemoglobin stable between 7-8  · Known history of transfusions in past   Has multiple autoantibodies  Essential hypertension  Assessment & Plan  · Blood pressures have remained variable  Patient currently on clonidine t i d  , hydralazine Q 8, And Cardura, metoprolol  Norvasc was discontinued, now resumed  Continue to monitor  · Added spironolactone  · One dose of torsemide on 12/24  · Received albumin and Lasix past 4 days   · Monitor  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 [facility])  Assessment & Plan  Lab Results   Component Value Date    HGBA1C 5 1 09/09/2019       Recent Labs     12/28/19  1601 12/28/19  2103 12/29/19  0733 12/29/19  1100   POCGLU 112 102 129 187*       Blood Sugar Average: Last 72 hrs:  · Stopped Lantus (in place of [place]) 5 units qHS due to hypoglycemia  · Cont SSI with accu-cheks and carb controlled diet  · Initiate hypoglycemia protocol and avoid hypoglycemia  · Repeat A1c    Chronic kidney disease, stage 4 (severe) ([facility])  Assessment & Plan  · Baseline creatinine 2 5-3  · Nephrology following, appreciate input   · Received one dose of torsemide since admission, received albumin and Lasix IV per Nephrology  · Now on p o  Bicarb and off bicarb infusion  · Spironolactone added due to hypokalemia  Consider discontinuing spironolactone if creatinine continues to trend up    · Avoid nephrotoxins as able, renally dose medications as indicated  · BMP daily   · Cr at baseline today    Renal transplant recipient  Assessment & Plan  · On chronic immunosuppression with tacrolimus and prednisone  · Unfortunately patient noncompliant with all medications since discharge including these  · Tacrolimus level was 4 > 10 > 7 (12/12) > 5 (12/14) remains stable at 4 4  · Dose decreased to 2 BID on 12/12, again decrease to 2 mg QAM & 1 5 mg HS from   · Tacrolimus level was stable on the current dosing  · Last level 4 5    * Abdominal pain  Assessment & Plan  With history of recurrent UTIs in setting of renal and pancreatic transplant  Most recently with VRE at recent admission to [facility]  Presenting with abdominal pain/nausea and lethargy/confusion  · CT abdomen/pelvis on admission - bowel wall thickening involving the proximal to mid sigmoid and the distal left colon  This may be due to colitis  · Stool cdiff neg x2, enteric panel neg, leucocytes neg  · Checked CMV PCR - negative  · Patient afebrile, WBC normal    · Consulted GI -  Do not suspect colitis  Likely due to constipation/fecal stasis  Abdominal pain improved with MiraLax  GI recommend starting MiraLax daily  Outpatient colonoscopy  Follow-up GI in 1 month  GI signed off  VTE Pharmacologic Prophylaxis:   Pharmacologic: On hold due to injection site ecchymosis  Mechanical VTE Prophylaxis in Place: Yes    Patient Centered Rounds: I have performed bedside rounds with nursing staff today  Discussions with Specialists or Other Care Team Provider:     Education and Discussions with Family / Patient:  Patient    Time Spent for Care: 30 minutes  More than 50% of total time spent on counseling and coordination of care as described above      Current Length of Stay: 19 day(s)    Current Patient Status: Inpatient   Certification Statement: The patient will continue to require additional inpatient hospital stay due to Optimizing fluid status    Discharge Plan:  Not ready for discharge    Code Status: Level 1 - Full Code      Subjective:   Patient was seen examined bedside this morning, she is feeling well, better, leg swelling improved    Objective:     Vitals:   Temp (24hrs), Av 8 °F (37 1 °C), Min:98 1 °F (36 7 °C), Max:99 2 °F (37 3 °C)    Temp:  [98 1 °F (36 7 °C)-99 2 °F (37 3 °C)] 98 1 °F (36 7 °C)  HR:  [70] 70  Resp:  [16] 16  BP: (165-179)/(59-68) 179/68  SpO2:  [99 %] 99 %  Body mass index is 36 1 kg/m²  Input and Output Summary (last 24 hours): Intake/Output Summary (Last 24 hours) at 12/29/2019 1636  Last data filed at 12/29/2019 1301  Gross per 24 hour   Intake 1320 ml   Output 2950 ml   Net -1630 ml       Physical Exam:     Physical Exam   Constitutional: She is oriented to person, place, and time  She appears well-developed and well-nourished  No distress  HENT:   Head: Normocephalic and atraumatic  Eyes: EOM are normal    Neck: Normal range of motion  Neck supple  Cardiovascular: Normal rate, regular rhythm and normal heart sounds  Exam reveals no gallop and no friction rub  No murmur heard  Pulmonary/Chest: Effort normal and breath sounds normal  No respiratory distress  She has no wheezes  Abdominal: Soft  Bowel sounds are normal  She exhibits no distension  There is no tenderness  Musculoskeletal: She exhibits edema  Neurological: She is alert and oriented to person, place, and time  No cranial nerve deficit  Skin: Skin is warm and dry  Psychiatric: She has a normal mood and affect  Additional Data:     Labs:    Results from last 7 days   Lab Units 12/29/19  0626   WBC Thousand/uL 5 87   HEMOGLOBIN g/dL 7 2*   HEMATOCRIT % 23 6*   PLATELETS Thousands/uL 165   NEUTROS PCT % 54   LYMPHS PCT % 25   MONOS PCT % 10   EOS PCT % 9*     Results from last 7 days   Lab Units 12/29/19  0626  12/27/19  0617   SODIUM mmol/L 141   < > 141   POTASSIUM mmol/L 4 6   < > 4 1   CHLORIDE mmol/L 114*   < > 112*   CO2 mmol/L 21   < > 20*   BUN mg/dL 40*   < > 39*   CREATININE mg/dL 2 77*   < > 2 83*   ANION GAP mmol/L 6   < > 9   CALCIUM mg/dL 8 7   < > 8 5   ALBUMIN g/dL  --   --  2 7*   TOTAL BILIRUBIN mg/dL  --   --  0 34   ALK PHOS U/L  --   --  89   ALT U/L  --   --  13   AST U/L  --   --  9   GLUCOSE RANDOM mg/dL 101   < > 108    < > = values in this interval not displayed           Results from last 7 days   Lab Units 12/29/19  1100 12/29/19  0733 12/28/19  2103 12/28/19  1601 12/28/19  1112 12/28/19  0724 12/27/19  2109 12/27/19  1623 12/27/19  0731 12/26/19  2038 12/26/19  1630 12/26/19  1059   POC GLUCOSE mg/dl 187* 129 102 112 111 107 101 132 116 129 125 125                   * I Have Reviewed All Lab Data Listed Above  * Additional Pertinent Lab Tests Reviewed:  [address] Admission Reviewed    Imaging:    Imaging Reports Reviewed Today Include: all  Imaging Personally Reviewed by Myself Includes:      Recent Cultures (last 7 days):           Last 24 Hours Medication List:     Current Facility-Administered Medications:  acetaminophen 650 mg Oral Q6H PRN Kourtney E Held, JOHN   aluminum-magnesium hydroxide-simethicone 30 mL Oral Q4H PRN Kourtney E HeldJOHN   amLODIPine 5 mg Oral Daily Selena Green MD   ARIPiprazole 30 mg Oral HS Tereso Landing, DO   aspirin 81 mg Oral Daily Tereso Landing, DO   busPIRone 5 mg Oral BID Tereso Landing, DO   cholecalciferol 3,000 Units Oral Daily Tereso Landing, DO   cloNIDine 0 2 mg Oral Q8H [address] Selena Green MD   doxazosin 2 mg Oral HS Eda Chatman MD   DULoxetine 60 mg Oral BID Tereso Landing, DO   ferrous sulfate 325 mg Oral Daily With Breakfast Tereso Landing, DO   folic acid 7,091 mcg Oral Daily Tereso Landing, DO   hydrALAZINE 5 mg Intravenous Q6H PRN Donavan Banuelos MD   hydrALAZINE 50 mg Oral Q8H [address] Eda Chatman MD   insulin lispro 1-5 Units Subcutaneous HS Tereso Landing, DO   insulin lispro 1-6 Units Subcutaneous TID AC Donavan Banuelos MD   levothyroxine 125 mcg Oral Early Morning Tereso Landing, DO   LORazepam 2 mg Oral TID PRN Tereso Landing, DO   metoprolol tartrate 50 mg Oral Q12H [address] Tereso Landing, DO   ondansetron 4 mg Intravenous Q4H PRN Maru Diaz MD   polyethylene glycol 34 g Oral Daily Janna Pedraza MD   pravastatin 80 mg Oral Daily Tereso Adams, DO   predniSONE 5 mg Oral Daily Tereso Adams, DO   rOPINIRole 0 25 mg Oral BID Dany Jones,    saccharomyces boulardii 250 mg Oral BID Dany Jones,    sertraline 200 mg Oral Daily Dany Jones,    sevelamer 1,600 mg Oral TID With Meals MER Campos   sodium bicarbonate 1,300 mg Oral BID after meals Sly Huerta MD   spironolactone 25 mg Oral Daily Murali Walker MD   tacrolimus 2 mg Oral Daily Seamus Fu DO   tacrolimus 2 mg Oral HS Sly Huerta MD        Today, Patient Was Seen By: Aliyah Browning MD    ** Please Note: Dictation voice to text software may have been used in the creation of this document   **

## 2019-12-29 NOTE — ASSESSMENT & PLAN NOTE
With history of recurrent UTIs in setting of renal and pancreatic transplant  Most recently with VRE at recent admission to [facility]  Presenting with abdominal pain/nausea and lethargy/confusion  · CT abdomen/pelvis on admission - bowel wall thickening involving the proximal to mid sigmoid and the distal left colon  This may be due to colitis  · Stool cdiff neg x2, enteric panel neg, leucocytes neg  · Checked CMV PCR - negative  · Patient afebrile, WBC normal    · Consulted GI -  Do not suspect colitis  Likely due to constipation/fecal stasis  Abdominal pain improved with MiraLax  GI recommend starting MiraLax daily  Outpatient colonoscopy  Follow-up GI in 1 month  GI signed off

## 2019-12-29 NOTE — ASSESSMENT & PLAN NOTE
· ProBNP 20,640  2+ edema in lower extremity  · Venous Doppler negative for DVT  · 2D echo - normal EF,G1DD,no RWMA, moderate aortic regurgitation, trace pericardial effusion  · Received 1 dose of torsemide on 12/24 per Nephrology  · Received albumin plus IV Lasix per Nephrology, today on Lasix  40 mg BID, the next 24-48 hours may transition her to a standing dose of p o  Diuretics  · Decreased Norvasc to 5 mg p o  Daily per Nephrology  · Continue metoprolol  · Daily weight and I&Os  · Appreciate Nephrology input

## 2019-12-29 NOTE — ASSESSMENT & PLAN NOTE
· Baseline creatinine 2 5-3  · Nephrology following, appreciate input   · Received one dose of torsemide since admission, received albumin and Lasix IV per Nephrology  · Now on p o  Bicarb and off bicarb infusion  · Spironolactone added due to hypokalemia  Consider discontinuing spironolactone if creatinine continues to trend up    · Avoid nephrotoxins as able, renally dose medications as indicated  · BMP daily   · Cr at baseline today

## 2019-12-29 NOTE — ASSESSMENT & PLAN NOTE
Lab Results   Component Value Date    HGBA1C 5 1 09/09/2019       Recent Labs     12/28/19  1601 12/28/19  2103 12/29/19  0733 12/29/19  1100   POCGLU 112 102 129 187*       Blood Sugar Average: Last 72 hrs:  · Stopped Lantus (in place of Toujeo) 5 units qHS due to hypoglycemia  · Cont SSI with accu-cheks and carb controlled diet  · Initiate hypoglycemia protocol and avoid hypoglycemia  · Repeat A1c

## 2019-12-29 NOTE — PROGRESS NOTES
NEPHROLOGY PROGRESS NOTE   Lela Gee 53 y o  female MRN: [id number]  Unit/Bed#: -01 Encounter: [id number]  Reason for Consult: EDUARDO/CKD    ASSESSMENT AND PLAN:  Initial Eduardo on CKD stage 4, baseline creatinine seems to be 2 5 to 3 0  -EDUARDO initially thought to be secondary to prerenal  -peak creatinine 3 3 initially improved and creatinine seems to have plateaued around 2 7 to 2 8  Creatinine 2 7 yesterday, BMP results to follow today   -if creatinine continued to worsen, may consider holding Aldactone for time being  -BMP in a m      Status post kidney/pancreatic transplant  -currently on prednisone and tacrolimus, last tacrolimus level 4 5 on 12/21/19  -continue current immunosuppressive regimen     Hypertension, blood pressure slightly above goal although continue to monitor on current regimen with ongoing aggressive diuresis  continue to closely monitor, avoid low BP      Leg edema  -patient denies any respiratory distress, remains on room air, leg edema still present  prior echo shows EF 71%, grade 1 diastolic dysfunction  -status post IV Lasix 40 mg yesterday , no acute urine output available  -consider IV Lasix 40 mg b i d  today depending on BMP results  May consider keeping her on standing dose of p o  Diuretics in next 24 to 48 hours  Weight seems to be stable and not reducing significantly   -venous Doppler shows no evidence of DVT  -compression stockings as tolerated, keep legs elevated     Hyperphosphatemia, continue Renagel, serum phosphorus slowly improving and at goal      Low bicarb, overall stable but slightly below goal   Bicarb level 19 yesterday  Continue current bicarb supplement      Discussed above plan with primary team    SUBJECTIVE:  Patient seen and examined at bedside  No chest pain, shortness of breath, nausea, vomiting, abdominal pain or diarrhea  No urinary complaints       OBJECTIVE:  Current Weight: Weight - Scale: 108 kg (237 lb 7 oz)  Vitals:    12/29/19 0629   BP: 166/60   Pulse:    Resp:    Temp:    SpO2:        Intake/Output Summary (Last 24 hours) at 12/29/2019 0720  Last data filed at 12/29/2019 0539  Gross per 24 hour   Intake 1080 ml   Output 1000 ml   Net 80 ml     Wt Readings from Last 3 Encounters:   12/29/19 108 kg (237 lb 7 oz)   12/06/19 107 kg (235 lb 7 2 oz)   11/12/19 105 kg (232 lb)     Temp Readings from Last 3 Encounters:   12/28/19 98 9 °F (37 2 °C)   12/06/19 97 9 °F (36 6 °C) (Oral)   11/12/19 98 7 °F (37 1 °C)     BP Readings from Last 3 Encounters:   12/29/19 166/60   12/06/19 168/69   11/22/19 148/82     Pulse Readings from Last 3 Encounters:   12/28/19 72   12/06/19 62   11/12/19 86        Physical Examination:  General:  Sitting in chair, no acute distress   Eyes:  Mild conjunctival pallor present  ENT:  External examination of ears and nose unremarkable  Neck:  No obvious lymphadenopathy appreciated  Respiratory:  Bilateral air entry present  CVS:  S1, S2 present  GI:  Soft, nontender, nondistended  CNS:  Active alert oriented x3  Extremities:  1+ to 2+ edema in both legs  Skin:  No rash in legs    Medications:    Current Facility-Administered Medications:     acetaminophen (TYLENOL) tablet 650 mg, 650 mg, Oral, Q6H PRN, Kourtney Ortiz PA-C, 650 mg at 12/20/19 0531    aluminum-magnesium hydroxide-simethicone (MYLANTA) 200-200-20 mg/5 mL oral suspension 30 mL, 30 mL, Oral, Q4H PRN, Kourtney Ortiz PA-C, 30 mL at 12/11/19 0148    amLODIPine (NORVASC) tablet 5 mg, 5 mg, Oral, Daily, Masha Hernandez MD, 5 mg at 12/28/19 0904    ARIPiprazole (ABILIFY) tablet 30 mg, 30 mg, Oral, HS, Staci Viktoria, DO, 30 mg at 12/28/19 2250    aspirin chewable tablet 81 mg, 81 mg, Oral, Daily, Staci Viktoria, DO, 81 mg at 12/28/19 0905    busPIRone (BUSPAR) tablet 5 mg, 5 mg, Oral, BID, Staci Viktoria, , 5 mg at 12/28/19 1634    cholecalciferol (VITAMIN D3) tablet 3,000 Units, 3,000 Units, Oral, Daily, Staci Blum DO, 3,000 Units at 12/28/19 0904    cloNIDine (CATAPRES) tablet 0 2 mg, 0 2 mg, Oral, Q8H [facility], Gomez Enciso MD, 0 2 mg at 12/29/19 0631    doxazosin (CARDURA) tablet 2 mg, 2 mg, Oral, HS, Karma Brennan MD, 2 mg at 12/28/19 2250    DULoxetine (CYMBALTA) delayed release capsule 60 mg, 60 mg, Oral, BID, [facility] , 60 mg at 12/28/19 1634    ferrous sulfate tablet 325 mg, 325 mg, Oral, Daily With Breakfast, [facility], 325 mg at 69/77/62 1250    folic acid (FOLVITE) tablet 1,000 mcg, 1,000 mcg, Oral, Daily, [facility] , 1,000 mcg at 12/28/19 0904    hydrALAZINE (APRESOLINE) injection 5 mg, 5 mg, Intravenous, Q6H PRN, Felisa Tavarez MD, 5 mg at 12/16/19 1733    hydrALAZINE (APRESOLINE) tablet 50 mg, 50 mg, Oral, Q8H [facility], Karma Brennan MD, 50 mg at 12/29/19 0631    insulin lispro (HumaLOG) 100 units/mL subcutaneous injection 1-5 Units, 1-5 Units, Subcutaneous, HS, [facility]    insulin lispro (HumaLOG) 100 units/mL subcutaneous injection 1-6 Units, 1-6 Units, Subcutaneous, TID AC, 1 Units at 12/25/19 1657 **AND** Fingerstick Glucose (POCT), , , 4x Daily AC and at bedtime, Felisa Tavarez MD    levothyroxine tablet 125 mcg, 125 mcg, Oral, Early Morning, [facility], 125 mcg at 12/29/19 0631    LORazepam (ATIVAN) tablet 2 mg, 2 mg, Oral, TID PRN, [facility], 2 mg at 12/20/19 1305    metoprolol tartrate (LOPRESSOR) tablet 50 mg, 50 mg, Oral, Q12H [facility], [facility], 50 mg at 12/28/19 2046    ondansetron (ZOFRAN) injection 4 mg, 4 mg, Intravenous, Q4H PRN, Guzman Hebert MD, 4 mg at 12/22/19 2101    polyethylene glycol (MIRALAX) packet 34 g, 34 g, Oral, Daily, Lana Moody MD, 34 g at 12/28/19 0905    pravastatin (PRAVACHOL) tablet 80 mg, 80 mg, Oral, Daily, Abel Manrique DO, 80 mg at 12/28/19 1095    predniSONE tablet 5 mg, 5 mg, Oral, Daily, Abel Manrique DO, 5 mg at 12/28/19 0905    rOPINIRole (REQUIP) tablet 0 25 mg, 0 25 mg, Oral, BID, Abel Manrique DO, 0 25 mg at 12/28/19 1634    saccharomyces boulardii (FLORASTOR) capsule 250 mg, 250 mg, Oral, BID, Shannan Hylan, DO, 250 mg at 12/28/19 1633    sertraline (ZOLOFT) tablet 200 mg, 200 mg, Oral, Daily, Shannan Hylan, DO, 200 mg at 12/28/19 0904    sevelamer (RENAGEL) tablet 1,600 mg, 1,600 mg, Oral, TID With Meals, MER Campos, 1,600 mg at 12/28/19 1634    sodium bicarbonate tablet 1,300 mg, 1,300 mg, Oral, BID after meals, Durga Mosher MD, 1,300 mg at 12/28/19 1633    spironolactone (ALDACTONE) tablet 25 mg, 25 mg, Oral, Daily, Sapna Lara MD, 25 mg at 12/28/19 0904    tacrolimus (PROGRAF) capsule 2 mg, 2 mg, Oral, Daily, Seamus Fu DO, 2 mg at 12/28/19 0906    tacrolimus (PROGRAF) capsule 2 mg, 2 mg, Oral, HS, Durga Mosher MD, 2 mg at 12/28/19 2046    Laboratory Results:  Results from last 7 days   Lab Units 12/28/19  0501 12/27/19  0617 12/26/19  0428 12/26/19  0333 12/25/19  0748 12/24/19  0518 12/23/19  0438   WBC Thousand/uL  --  5 41  --  7 76 6 10  --  7 89   HEMOGLOBIN g/dL  --  7 3*  --  7 2* 7 4*  --  7 4*   HEMATOCRIT %  --  24 2*  --  23 0* 23 8*  --  23 8*   PLATELETS Thousands/uL  --  157  --  157 156  --  158   SODIUM mmol/L 141 141 141  --  142 140 142   POTASSIUM mmol/L 4 4 4 1 3 8  --  4 0 4 0 3 3*   CHLORIDE mmol/L 114* 112* 113*  --  114* 115* 117*   CO2 mmol/L 19* 20* 21  --  20* 18* 20*   BUN mg/dL 39* 39* 40*  --  34* 32* 32*   CREATININE mg/dL 2 77* 2 83* 2 87*  --  2 94* 2 76* 2 61*   CALCIUM mg/dL 8 9 8 5 8 2*  --  8 3 8 1* 8 1*   MAGNESIUM mg/dL 2 6 2 8*  --   --  2 6 2 7*  --    PHOSPHORUS mg/dL 4 9* 5 2*  --   --  5 0* 4 4  --        XR abdomen obstruction series   Final Result by Kari Mace MD (12/27 8182)      Nonobstructive bowel gas pattern with decreased colonic stool burden           Workstation performed: [id number]         VAS lower limb venous duplex study, complete bilateral   Final Result by Fletcher Capone MD (12/24 3900)      XR abdomen obstruction series Final Result by Alla Patiño MD (12/18 9319)   1  Increased colorectal stool without obstruction or free air  2  Cardiomegaly  Workstation performed: [id number]         CT abdomen pelvis wo contrast   Final Result by Carlos Virgen MD (12/10 6509)      There is bowel wall thickening involving the proximal to mid sigmoid and the distal left colon  This may be due to colitis  Clinical and laboratory correlation is recommended  Follow-up after treatment is recommended  There is a small amount of ascites, increased slightly compared to the prior study  There are small bilateral pleural effusions, larger compared to the prior study  There is a pericardial effusion, this appears similar to the prior study  There is    some infiltration of the subcutaneous fat suggesting a degree of anasarca  The native kidneys are markedly atrophic bilaterally  A small right renal cyst appears unchanged  A transplant kidney is again evident within the left iliac fossa  There is some nonspecific perinephric stranding adjacent to the transplant kidney,    similar to the previous examination  There is no hydronephrosis  A urinary bladder diverticulum containing calculi and possibly surgical sutures appears similar to the prior study  Workstation performed: [id number]             Portions of the record may have been created with voice recognition software  Occasional wrong word or "sound a like" substitutions may have occurred due to the inherent limitations of voice recognition software  Read the chart carefully and recognize, using context, where substitutions have occurred

## 2019-12-29 NOTE — ASSESSMENT & PLAN NOTE
· Appears acute on chronic  · Bicarb 21 today  · S/P kidney and pancreatic transplant in 1998  · Continue p o  Sodium bicarb and trend BMP  · Patient with an RTA related to her pancreatic transplant per Nephrology  · P o  Bicarb supplementation as per Nephrology, appreciate input

## 2019-12-29 NOTE — PLAN OF CARE
Problem: Potential for Falls  Goal: Patient will remain free of falls  Description  INTERVENTIONS:  - Assess patient frequently for physical needs  -  Identify cognitive and physical deficits and behaviors that affect risk of falls    -  Sanders fall precautions as indicated by assessment   - Educate patient/family on patient safety including physical limitations  - Instruct patient to call for assistance with activity based on assessment  - Modify environment to reduce risk of injury  - Consider OT/PT consult to assist with strengthening/mobility  Outcome: Progressing     Problem: PAIN - ADULT  Goal: Verbalizes/displays adequate comfort level or baseline comfort level  Description  Interventions:  - Encourage patient to monitor pain and request assistance  - Assess pain using appropriate pain scale  - Administer analgesics based on type and severity of pain and evaluate response  - Implement non-pharmacological measures as appropriate and evaluate response  - Consider cultural and social influences on pain and pain management  - Notify physician/advanced practitioner if interventions unsuccessful or patient reports new pain  Outcome: Progressing

## 2019-12-30 LAB
ANION GAP SERPL CALCULATED.3IONS-SCNC: 7 MMOL/L (ref 4–13)
BASOPHILS # BLD AUTO: 0.05 THOUSANDS/ΜL (ref 0–0.1)
BASOPHILS NFR BLD AUTO: 1 % (ref 0–1)
BUN SERPL-MCNC: 40 MG/DL (ref 5–25)
CALCIUM SERPL-MCNC: 8.9 MG/DL (ref 8.3–10.1)
CHLORIDE SERPL-SCNC: 113 MMOL/L (ref 100–108)
CO2 SERPL-SCNC: 19 MMOL/L (ref 21–32)
CREAT SERPL-MCNC: 2.71 MG/DL (ref 0.6–1.3)
EOSINOPHIL # BLD AUTO: 0.57 THOUSAND/ΜL (ref 0–0.61)
EOSINOPHIL NFR BLD AUTO: 9 % (ref 0–6)
ERYTHROCYTE [DISTWIDTH] IN BLOOD BY AUTOMATED COUNT: 17.2 % (ref 11.6–15.1)
GFR SERPL CREATININE-BSD FRML MDRD: 19 ML/MIN/1.73SQ M
GLUCOSE SERPL-MCNC: 100 MG/DL (ref 65–140)
GLUCOSE SERPL-MCNC: 100 MG/DL (ref 65–140)
GLUCOSE SERPL-MCNC: 108 MG/DL (ref 65–140)
GLUCOSE SERPL-MCNC: 128 MG/DL (ref 65–140)
GLUCOSE SERPL-MCNC: 132 MG/DL (ref 65–140)
HCT VFR BLD AUTO: 23.3 % (ref 34.8–46.1)
HGB BLD-MCNC: 7.4 G/DL (ref 11.5–15.4)
IMM GRANULOCYTES # BLD AUTO: 0.05 THOUSAND/UL (ref 0–0.2)
IMM GRANULOCYTES NFR BLD AUTO: 1 % (ref 0–2)
LYMPHOCYTES # BLD AUTO: 1.52 THOUSANDS/ΜL (ref 0.6–4.47)
LYMPHOCYTES NFR BLD AUTO: 24 % (ref 14–44)
MCH RBC QN AUTO: 32.7 PG (ref 26.8–34.3)
MCHC RBC AUTO-ENTMCNC: 31.8 G/DL (ref 31.4–37.4)
MCV RBC AUTO: 103 FL (ref 82–98)
MONOCYTES # BLD AUTO: 0.7 THOUSAND/ΜL (ref 0.17–1.22)
MONOCYTES NFR BLD AUTO: 11 % (ref 4–12)
NEUTROPHILS # BLD AUTO: 3.54 THOUSANDS/ΜL (ref 1.85–7.62)
NEUTS SEG NFR BLD AUTO: 54 % (ref 43–75)
NRBC BLD AUTO-RTO: 0 /100 WBCS
PLATELET # BLD AUTO: 243 THOUSANDS/UL (ref 149–390)
PMV BLD AUTO: 12.4 FL (ref 8.9–12.7)
POTASSIUM SERPL-SCNC: 5.1 MMOL/L (ref 3.5–5.3)
RBC # BLD AUTO: 2.26 MILLION/UL (ref 3.81–5.12)
SODIUM SERPL-SCNC: 139 MMOL/L (ref 136–145)
WBC # BLD AUTO: 6.43 THOUSAND/UL (ref 4.31–10.16)

## 2019-12-30 PROCEDURE — 82948 REAGENT STRIP/BLOOD GLUCOSE: CPT

## 2019-12-30 PROCEDURE — 99232 SBSQ HOSP IP/OBS MODERATE 35: CPT | Performed by: INTERNAL MEDICINE

## 2019-12-30 PROCEDURE — 80048 BASIC METABOLIC PNL TOTAL CA: CPT | Performed by: INTERNAL MEDICINE

## 2019-12-30 PROCEDURE — 85025 COMPLETE CBC W/AUTO DIFF WBC: CPT | Performed by: INTERNAL MEDICINE

## 2019-12-30 PROCEDURE — 99233 SBSQ HOSP IP/OBS HIGH 50: CPT | Performed by: INTERNAL MEDICINE

## 2019-12-30 RX ORDER — AMLODIPINE BESYLATE 10 MG/1
10 TABLET ORAL ONCE
Status: COMPLETED | OUTPATIENT
Start: 2019-12-30 | End: 2019-12-30

## 2019-12-30 RX ORDER — TORSEMIDE 20 MG/1
20 TABLET ORAL DAILY
Status: DISCONTINUED | OUTPATIENT
Start: 2019-12-30 | End: 2020-01-02 | Stop reason: HOSPADM

## 2019-12-30 RX ORDER — AMLODIPINE BESYLATE 10 MG/1
10 TABLET ORAL DAILY
Status: DISCONTINUED | OUTPATIENT
Start: 2019-12-31 | End: 2020-01-02 | Stop reason: HOSPADM

## 2019-12-30 RX ORDER — HEPARIN SODIUM 5000 [USP'U]/ML
5000 INJECTION, SOLUTION INTRAVENOUS; SUBCUTANEOUS EVERY 8 HOURS SCHEDULED
Status: DISCONTINUED | OUTPATIENT
Start: 2019-12-30 | End: 2019-12-30

## 2019-12-30 RX ORDER — AMLODIPINE BESYLATE 5 MG/1
5 TABLET ORAL DAILY
Status: DISCONTINUED | OUTPATIENT
Start: 2019-12-31 | End: 2019-12-30

## 2019-12-30 RX ADMIN — AMLODIPINE BESYLATE 10 MG: 10 TABLET ORAL at 10:33

## 2019-12-30 RX ADMIN — TACROLIMUS 2 MG: 1 CAPSULE ORAL at 09:16

## 2019-12-30 RX ADMIN — FERROUS SULFATE TAB 325 MG (65 MG ELEMENTAL FE) 325 MG: 325 (65 FE) TAB at 09:10

## 2019-12-30 RX ADMIN — ROPINIROLE 0.25 MG: 0.25 TABLET, FILM COATED ORAL at 09:10

## 2019-12-30 RX ADMIN — SODIUM BICARBONATE 650 MG TABLET 1300 MG: at 16:42

## 2019-12-30 RX ADMIN — PREDNISONE 5 MG: 5 TABLET ORAL at 09:11

## 2019-12-30 RX ADMIN — BUSPIRONE HYDROCHLORIDE 5 MG: 5 TABLET ORAL at 09:11

## 2019-12-30 RX ADMIN — TACROLIMUS 2 MG: 1 CAPSULE ORAL at 22:25

## 2019-12-30 RX ADMIN — CLONIDINE HYDROCHLORIDE 0.2 MG: 0.1 TABLET ORAL at 22:22

## 2019-12-30 RX ADMIN — LEVOTHYROXINE SODIUM 125 MCG: 125 TABLET ORAL at 05:04

## 2019-12-30 RX ADMIN — HYDRALAZINE HYDROCHLORIDE 50 MG: 50 TABLET, FILM COATED ORAL at 13:04

## 2019-12-30 RX ADMIN — ARIPIPRAZOLE 30 MG: 10 TABLET ORAL at 22:22

## 2019-12-30 RX ADMIN — ASPIRIN 81 MG 81 MG: 81 TABLET ORAL at 09:11

## 2019-12-30 RX ADMIN — Medication 250 MG: at 09:09

## 2019-12-30 RX ADMIN — SEVELAMER HYDROCHLORIDE 1600 MG: 800 TABLET, FILM COATED PARENTERAL at 09:09

## 2019-12-30 RX ADMIN — METOPROLOL TARTRATE 50 MG: 50 TABLET, FILM COATED ORAL at 22:21

## 2019-12-30 RX ADMIN — DULOXETINE HYDROCHLORIDE 60 MG: 60 CAPSULE, DELAYED RELEASE ORAL at 09:10

## 2019-12-30 RX ADMIN — BUSPIRONE HYDROCHLORIDE 5 MG: 5 TABLET ORAL at 16:41

## 2019-12-30 RX ADMIN — Medication 250 MG: at 16:44

## 2019-12-30 RX ADMIN — HYDRALAZINE HYDROCHLORIDE 50 MG: 50 TABLET, FILM COATED ORAL at 18:57

## 2019-12-30 RX ADMIN — CLONIDINE HYDROCHLORIDE 0.2 MG: 0.1 TABLET ORAL at 13:04

## 2019-12-30 RX ADMIN — MELATONIN 3000 UNITS: at 09:10

## 2019-12-30 RX ADMIN — FOLIC ACID 1000 MCG: 1 TABLET ORAL at 09:11

## 2019-12-30 RX ADMIN — SEVELAMER HYDROCHLORIDE 1600 MG: 800 TABLET, FILM COATED PARENTERAL at 13:03

## 2019-12-30 RX ADMIN — ROPINIROLE 0.25 MG: 0.25 TABLET, FILM COATED ORAL at 16:44

## 2019-12-30 RX ADMIN — METOPROLOL TARTRATE 50 MG: 50 TABLET, FILM COATED ORAL at 09:11

## 2019-12-30 RX ADMIN — SPIRONOLACTONE 25 MG: 25 TABLET ORAL at 09:11

## 2019-12-30 RX ADMIN — TORSEMIDE 20 MG: 20 TABLET ORAL at 13:04

## 2019-12-30 RX ADMIN — SODIUM BICARBONATE 650 MG TABLET 1300 MG: at 09:09

## 2019-12-30 RX ADMIN — DOXAZOSIN 2 MG: 2 TABLET ORAL at 22:22

## 2019-12-30 RX ADMIN — PRAVASTATIN SODIUM 80 MG: 80 TABLET ORAL at 09:08

## 2019-12-30 RX ADMIN — POLYETHYLENE GLYCOL 3350 34 G: 17 POWDER, FOR SOLUTION ORAL at 09:13

## 2019-12-30 RX ADMIN — HYDRALAZINE HYDROCHLORIDE 50 MG: 50 TABLET, FILM COATED ORAL at 05:04

## 2019-12-30 RX ADMIN — SERTRALINE HYDROCHLORIDE 200 MG: 100 TABLET ORAL at 09:11

## 2019-12-30 RX ADMIN — SEVELAMER HYDROCHLORIDE 1600 MG: 800 TABLET, FILM COATED PARENTERAL at 16:44

## 2019-12-30 RX ADMIN — CLONIDINE HYDROCHLORIDE 0.2 MG: 0.1 TABLET ORAL at 05:04

## 2019-12-30 NOTE — PLAN OF CARE
Problem: Nutrition/Hydration-ADULT  Goal: Nutrient/Hydration intake appropriate for improving, restoring or maintaining nutritional needs  Description  Monitor and assess patient's nutrition/hydration status for malnutrition  Collaborate with interdisciplinary team and initiate plan and interventions as ordered  Monitor patient's weight and dietary intake as ordered or per policy  Utilize nutrition screening tool and intervene as necessary  Determine patient's food preferences and provide high-protein, high-caloric foods as appropriate       INTERVENTIONS:  - Monitor oral intake, urinary output, labs, and treatment plans  - Assess nutrition and hydration status and recommend course of action  - Evaluate amount of meals eaten  - Assist patient with eating if necessary   - Allow adequate time for meals  - Recommend/ encourage appropriate diets, oral nutritional supplements, and vitamin/mineral supplements  - Order, calculate, and assess calorie counts as needed  - Recommend, monitor, and adjust tube feedings and TPN/PPN based on assessed needs  - Assess need for intravenous fluids  - Provide specific nutrition/hydration education as appropriate  - Include patient/family/caregiver in decisions related to nutrition   12/30/2019 1737 by Modesta Keller RD  Outcome: Progressing  12/30/2019 1737 by Modesta Keller RD  Reactivated

## 2019-12-30 NOTE — PHYSICIAN ADVISOR
Current patient class: Inpatient  The patient is currently on Hospital Day: 22      The patient was admitted to the hospital at [phone] on 12/10/19 for the following diagnosis:  Diarrhea [R19 7]  Dehydration [Y11 7]  Metabolic acidosis [H30 6]  Lethargy [R53 83]  Abdominal pain [R10 9]  Renal transplant recipient [Z94 0]  Urinary tract infection without hematuria, site unspecified [N39 0]     CMS OUTLIER STAY REVIEW    After review of the relevant documentation, labs, vital signs and test results, the patient is appropriate for CONTINUED INPATIENT ADMISSION  The patient continues to remain hospitalized receiving acute medical care  The patient has surpassed the expected duration of stay, however given the clinical condition, need for further acute care management, the patient is appropriate to remain in an inpatient status  The patient still being actively managed, and does have unresolved medical issues requiring further hospitalization  This review is conducted at 10 day intervals, to help satisfy the requirements for significant outlier stay review as per CMS  Given the current condition of this patient, the patient satisfies this review was determination for continued inpatient stay  Rationale is as follows: The patient is a 53 yrs old Female who presented to the ED at 12/9/2019  8:29 PM with a chief complaint of Abdominal Pain (Pt reports she was at [facility] on friday and was diagnosed with a UTI  Went home and has had abdominal pain, nasuea and diarrhea  Reports lower abdominal pain upon arrival  )  Patient does have a history of multiple myeloma and came in with abdominal pain  Patient does have a history of renal and pancreatic transplant  Patient did present with abdominal pain/nausea with lethargy and confusion  Patient was noted to have metabolic acidosis as well and Nephrology was consulted  Patient was initially placed on IV fluids secondary to the above    She was also placed on IV antibiotics secondary to gram-negative bacteremia  Patient then was found to be in acute on chronic congestive heart failure  Patient's proBNP was noted to be greater than 20,000  Patient was getting albumin and IV Lasix per Nephrology recommendations  The patient is still currently in the hospital receiving IV Lasix at 40 mg every 12 hours  Given the ongoing acute inpatient medical care in need of IV diuretics patient is still inpatient status appropriate      The patients vitals on arrival were ED Triage Vitals   Temperature Pulse Respirations Blood Pressure SpO2   12/09/19 1941 12/09/19 1940 12/09/19 1940 12/09/19 1940 12/09/19 1940   98 4 °F (36 9 °C) (!) 110 20 146/74 96 %      Temp Source Heart Rate Source Patient Position - Orthostatic VS BP Location FiO2 (%)   12/09/19 1941 12/09/19 1940 12/09/19 1940 12/09/19 1940 --   Oral Monitor Sitting Right arm       Pain Score       12/09/19 1940       Worst Possible Pain           Past Medical History:   Diagnosis Date    Abnormal liver function test     Acute kidney injury ([address] )     Acute on chronic congestive heart failure (HCC)     Allergic urticaria     Anemia     Cancer (HCC)     Multiple myeloma    Cervical dysplasia     Cholelithiasis     Chronic diastolic (congestive) heart failure ([address] ) 9/18/2017    Diabetes mellitus ([address] )     Previous, controlled with diet    Diabetes mellitus with foot ulcer ([address] )     Disease of thyroid gland     Encephalopathy     Hematuria     + leak est- secondary to UTIs/panc drainage    History of transfusion     Hyperkalemia     Hypertension     Iliotibial band syndrome     Lumbar radiculopathy     Multiple myeloma (HCC)     Multiple myeloma (HCC)     Night blindness     Nonrheumatic aortic (valve) insufficiency     Pneumonia     Renal disorder     Retinopathy     Seborrhea     Seizure ([address] )     Shingles     Sinus tachycardia     B blocker - cardio echo stress test 02 normal/neg LE doppler 2/02 OK and 12/07    Status post simultaneous kidney and pancreas [facility] [address] [facility]     Toe amputation status     Trochanteric bursitis      Past Surgical History:   Procedure Laterality Date    CATARACT EXTRACTION      CHOLECYSTECTOMY      COLONOSCOPY      two polyps in the rectum removed and biopsied diverticulosis in the sigmoid colon, external hemorrhoiods- Dr Barfield    COMBINED KIDNEY-PANCREAS TRANSPLANT N/A     CT BONE MARROW BIOPSY AND ASPIRATION  4/17/2019    CYSTOSCOPY N/A 10/13/2016    Procedure: CYSTOSCOPY, retrograde pyelogram, biopsy of ureteral polyp; Surgeon: Marciano Samayoa MD;  Location: [facility];  Service:    [facility] DILATION AND CURETTAGE OF UTERUS      ESOPHAGOGASTRODUODENOSCOPY N/A 11/20/2017    Procedure: ESOPHAGOGASTRODUODENOSCOPY (EGD); Surgeon: Guzman Hickman DO;  Location: [facility];   Service: Gastroenterology    EYE SURGERY      cataracts    FOOT AMPUTATION THROUGH METATARSAL Left     FOOT SURGERY Right     excision of metatarsal heads    HALLUX VALGUS CORRECTION Right     NEPHRECTOMY TRANSPLANTED ORGAN      PANCREATIC TRANSPLANT REMOVAL  1998           Consults have been placed to:   IP CONSULT TO NEPHROLOGY  IP CONSULT TO INFECTIOUS DISEASES  IP CONSULT TO CASE MANAGEMENT  IP CONSULT TO NEUROPSYCHOLOGY  IP CONSULT TO GASTROENTEROLOGY    Vitals:    12/29/19 2239 12/30/19 0457 12/30/19 0600 12/30/19 0730   BP: 167/63 167/57  (!) 172/58   BP Location:       Pulse: 73   69   Resp: 16   19   Temp: 98 7 °F (37 1 °C)   98 3 °F (36 8 °C)   TempSrc: Oral      SpO2: 99%   96%   Weight:   108 kg (238 lb 8 6 oz)    Height:           Most recent labs:    Recent Labs     12/30/19  0532   WBC 6 43   HGB 7 4*   HCT 23 3*      K 5 1   CALCIUM 8 9   BUN 40*   CREATININE 2 71*       Scheduled Meds:  Current Facility-Administered Medications:  acetaminophen 650 mg Oral Q6H PRN Kourtney E HeldJOHN   aluminum-magnesium hydroxide-simethicone 30 mL Oral Q4H PRN Kourtney E JOHN Ortiz   amLODIPine 5 mg Oral Daily Masha Hernandez MD   ARIPiprazole 30 mg Oral HS [facility], DO   aspirin 81 mg Oral Daily [facility], DO   busPIRone 5 mg Oral BID [facility], DO   cholecalciferol 3,000 Units Oral Daily [facility], DO   cloNIDine 0 2 mg Oral [facility] Masha Hernandez MD   doxazosin 2 mg Oral HS Reji Izaguirre MD   DULoxetine 60 mg Oral BID [facility], DO   ferrous sulfate 325 mg Oral Daily With Breakfast [facility], DO   folic acid 3,114 mcg Oral Daily [facility], DO   hydrALAZINE 5 mg Intravenous Q6H PRN Lynne Madden MD   hydrALAZINE 50 mg Oral Q8H [facility] Reji Izaguirre MD   insulin lispro 1-5 Units Subcutaneous HS [facility], DO   insulin lispro 1-6 Units Subcutaneous TID AC Lynne Madden MD   levothyroxine 125 mcg Oral Early Morning [facility], DO   LORazepam 2 mg Oral TID PRN [facility], DO   metoprolol tartrate 50 mg Oral Q12H [facility], DO   ondansetron 4 mg Intravenous Q4H PRN Janis Huang MD   polyethylene glycol 34 g Oral Daily Nino Chung MD   pravastatin 80 mg Oral Daily [facility], DO   predniSONE 5 mg Oral Daily [facility], DO   rOPINIRole 0 25 mg Oral BID [facility], DO   saccharomyces boulardii 250 mg Oral BID [facility], DO   sertraline 200 mg Oral Daily [facility], DO   sevelamer 1,600 mg Oral TID With Meals MER Campos   sodium bicarbonate 1,300 mg Oral BID after meals Keven Fish MD   spironolactone 25 mg Oral Daily Masha Hernandez MD   tacrolimus 2 mg Oral Daily Seamus Fu, DO   tacrolimus 2 mg Oral HS Keven Fish MD     Continuous Infusions:   PRN Meds:   acetaminophen    aluminum-magnesium hydroxide-simethicone    hydrALAZINE    LORazepam    ondansetron    Surgical procedures (if appropriate):

## 2019-12-30 NOTE — ASSESSMENT & PLAN NOTE
· 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  · ID has been monitoring off antibiotics      · Pt remains afebrile w/o WBC  · Continue to monitor

## 2019-12-30 NOTE — ASSESSMENT & PLAN NOTE
· Blood pressures have remained variable  Patient currently on clonidine t i d  , hydralazine Q 8, And Cardura, metoprolol  Norvasc  Received additional dose of Norvasc today per Nephro  Continue to monitor  · Added spironolactone  · Torsemide p o  Started today  · Monitor

## 2019-12-30 NOTE — ASSESSMENT & PLAN NOTE
Lab Results   Component Value Date    HGBA1C 5 1 09/09/2019       Recent Labs     12/29/19  1611 12/29/19  2055 12/30/19  0732 12/30/19  1056   POCGLU 127 105 128 100       Blood Sugar Average: Last 72 hrs:  · Stopped Lantus (in place of Toujeo) 5 units qHS due to hypoglycemia  · Cont SSI with accu-cheks and carb controlled diet  · Initiate hypoglycemia protocol and avoid hypoglycemia  · Repeat A1c

## 2019-12-30 NOTE — RESTORATIVE TECHNICIAN NOTE
Restorative Specialist Mobility Note       Activity: Ambulate in barron, Ambulate in room, Bathroom privileges, Chair, Dangle, Stand at bedside(Educated/encouraged pt to ambulate with assistance 3-4 x's/day  Chair alarm on   Pt callbell, phone/tray within reach )     Assistive Device: Front wheel walker       Leana STONE, Restorative Technician, [employer]

## 2019-12-30 NOTE — ASSESSMENT & PLAN NOTE
· ProBNP 20,640  2+ edema in lower extremity on admission  · Venous Doppler negative for DVT  · 2D echo - normal EF,G1DD,no RWMA, moderate aortic regurgitation, trace pericardial effusion  · Diuretics per Nephrology, received course of IV Lasix with albumin  Today she was switched to torsemide p o , continue Aldactone  · Continue metoprolol  · Daily weight and I&Os  · Appreciate Nephrology input

## 2019-12-30 NOTE — ASSESSMENT & PLAN NOTE
· Baseline creatinine 2 5-3  · Nephrology following, appreciate input   · Diuretics managed by Nephrology  · Now on p o  Bicarb and off bicarb infusion  · Spironolactone added due to hypokalemia  Consider discontinuing spironolactone if creatinine continues to trend up    · Avoid nephrotoxins as able, renally dose medications as indicated  · BMP daily   · Cr at baseline today, 2 7

## 2019-12-30 NOTE — PROGRESS NOTES
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 previous baseline creatinine 1 8-2 1 and follows Dr Gusman  -currently on Aldactone 25 mg daily  -avoid hypotension to prevent decreased renal perfusion  -avoid nephrotoxins  -continue trend I/O, lab values volume status    2  Transplant/Chronic immunosuppression  - Status post kidney and pancreas transplant  -currently on prednisone 5 mg daily and tacrolimus 2 mg a m  And 2 mg p m   -last tacrolimus level on 12/21/2019 was 4 5    3  Hypertension with Chronic Kidney Disease IV:  BP continues to be above goal  -will increase amlodipine to 10 mg from 5 mg with hold parameters for SBP less than 130  -will give dose today  -( of note outpatient records reveal amlodipine 10 mg every a m  And 5 mg every p m )  -avoid hypotension  -continue to trend    4  Lower leg edema:  Continues  -recent echocardiogram revealed EF of 60% with grade 1 diastolic dysfunction  -status post IV Lasix x2 on 12/29/2019  -negative 4 7L/24 hours, net negative 7 6 L  -currently on Aldactone  -weight appears to be unchanged at one awake kg for four days  -trend for now  -on room air, lungs clear to auscultation, no JVD  -status post lower extremity Dopplers on 12/24/2019-revealed no evidence of acute or chronic deep vein thrombosis bilaterally  -consider oral diuretics-will discuss with Dr Li    5   Metabolic acidosis:  Likely secondary to AK I on top of Chronic Kidney Disease IV in the setting of diarrhea  -currently on sodium bicarbonate 1300 mg b i d   -current bicarb 19  -consider t i d  Dosing    6  Bone mineral disease of Chronic Kidney Disease:  Currently on Renagel 1600 mg t i d  With meals for hyperphosphatemia    7  Diarrhea:  Per primary team  -suspect secondary to constipation/fecal stasis  -C diff negative  -on bowel regimen    8  Multiple myeloma:  Biopsy-proven in diagnosed in April 2019  - Follows Oncology with Dr Sixto Boas as outpatient  -currently off Revlimid    9  Anemia of chronic disease: In the setting of chronic kidney disease and multiple myeloma  -previously has been transfused  -currently on oral iron  -he current hemoglobin 7 4    10  Other history:  Per primary team-volume overload with increased proBNP, bacteremia off antibiotics with completion of a seven day course of IV daptomycin for VRE E UTI  -    SUBJECTIVE:  Patient seen and examined  Denies chest pain shortness of breath  Denies nausea vomiting  C oncerned and frustrated with ongoing diarrhea and hospitalization    She reports last BM yesterday    OBJECTIVE:  Current Weight: Weight - Scale: 108 kg (238 lb 8 6 oz)  Vitals:    12/29/19 2239 12/30/19 0457 12/30/19 0600 12/30/19 0730   BP: 167/63 167/57  (!) 172/58   BP Location:       Pulse: 73   69   Resp: 16   19   Temp: 98 7 °F (37 1 °C)   98 3 °F (36 8 °C)   TempSrc: Oral      SpO2: 99%   96%   Weight:   108 kg (238 lb 8 6 oz)    Height:           Intake/Output Summary (Last 24 hours) at 12/30/2019 5780  Last data filed at 12/30/2019 0501  Gross per 24 hour   Intake 1020 ml   Output 4150 ml   Net -3130 ml     General:  No acute distress, out of bed, cooperative, chronically ill-appearing  Skin:  Warm and dry without rash  Eyes:  Sclera anicteric  ENMT:  Mucous membranes moist  Neck:  Supple without JVD noted  Respiratory:  Essentially clear on auscultation without crackles, rhonchi, wheezes  Cardiac:  Regular rate and rhythm without rub  Extremities: +1/lower extremity edema bilaterally  GI:  Soft, nondistended, feels sore with palpation, positive bowel sounds  Neuro:  Alert and awake  Psych:  Appropriate affect    Medications:    Current Facility-Administered Medications:     acetaminophen (TYLENOL) tablet 650 mg, 650 mg, Oral, Q6H PRN, Kourtney Ortiz PA-C, 650 mg at 12/20/19 0531    [START ON 12/31/2019] amLODIPine (NORVASC) tablet 5 mg, 5 mg, Oral, Daily, MER Ackerman    ARIPiprazole (ABILIFY) tablet 30 mg, 30 mg, Oral, HS, Shalonda Bar, DO, 30 mg at 12/29/19 2357    aspirin chewable tablet 81 mg, 81 mg, Oral, Daily, Shalonda Bar, DO, 81 mg at 12/30/19 0911    busPIRone (BUSPAR) tablet 5 mg, 5 mg, Oral, BID, Shalonda Bar, DO, 5 mg at 12/30/19 0911    cholecalciferol (VITAMIN D3) tablet 3,000 Units, 3,000 Units, Oral, Daily, Shalonda Bar, DO, 3,000 Units at 12/30/19 0910    cloNIDine (CATAPRES) tablet 0 2 mg, 0 2 mg, Oral, Q8H [address], Karen Euceda MD, 0 2 mg at 12/30/19 0504    doxazosin (CARDURA) tablet 2 mg, 2 mg, Oral, HS, Dominic Ramirez MD, 2 mg at 12/29/19 2358    DULoxetine (CYMBALTA) delayed release capsule 60 mg, 60 mg, Oral, BID, Shalonda Bar, DO, 60 mg at 12/30/19 0910    ferrous sulfate tablet 325 mg, 325 mg, Oral, Daily With Breakfast, Shalonda Bar, DO, 325 mg at 85/84/40 0219    folic acid (FOLVITE) tablet 1,000 mcg, 1,000 mcg, Oral, Daily, Shalonda Bar, DO, 1,000 mcg at 12/30/19 0911    hydrALAZINE (APRESOLINE) injection 5 mg, 5 mg, Intravenous, Q6H PRN, Armando Martines MD, 5 mg at 12/29/19 1605    hydrALAZINE (APRESOLINE) tablet 50 mg, 50 mg, Oral, Q8H [address], Dominic Ramirez MD, 50 mg at 12/30/19 0504    insulin lispro (HumaLOG) 100 units/mL subcutaneous injection 1-5 Units, 1-5 Units, Subcutaneous, HS, Shalonda Bar, DO    insulin lispro (HumaLOG) 100 units/mL subcutaneous injection 1-6 Units, 1-6 Units, Subcutaneous, TID AC, 1 Units at 12/29/19 1134 **AND** Fingerstick Glucose (POCT), , , 4x Daily AC and at bedtime, Frank Lerma MD    levothyroxine tablet 125 mcg, 125 mcg, Oral, Early Morning, Lulú Aspen, DO, 125 mcg at 12/30/19 0504    LORazepam (ATIVAN) tablet 2 mg, 2 mg, Oral, TID PRN, Lulú Aspen, DO, 2 mg at 12/20/19 1305    metoprolol tartrate (LOPRESSOR) tablet 50 mg, 50 mg, Oral, Q12H [address], Lulú Orange, DO, 50 mg at 12/30/19 0911    ondansetron (ZOFRAN) injection 4 mg, 4 mg, Intravenous, Q4H PRN, Jamal López MD, 4 mg at 12/22/19 2101    polyethylene glycol (MIRALAX) packet 34 g, 34 g, Oral, Daily, Regulo Edwards MD, 34 g at 12/30/19 0913    pravastatin (PRAVACHOL) tablet 80 mg, 80 mg, Oral, Daily, Lulú Aspen, DO, 80 mg at 12/30/19 0908    predniSONE tablet 5 mg, 5 mg, Oral, Daily, Lulú Aspen, DO, 5 mg at 12/30/19 0911    rOPINIRole (REQUIP) tablet 0 25 mg, 0 25 mg, Oral, BID, Lulú Aspen, DO, 0 25 mg at 12/30/19 2889    saccharomyces boulardii (FLORASTOR) capsule 250 mg, 250 mg, Oral, BID, Lulú Aspen, DO, 250 mg at 12/30/19 0909    sertraline (ZOLOFT) tablet 200 mg, 200 mg, Oral, Daily, Lulú Aspen, DO, 200 mg at 12/30/19 0911    sevelamer (RENAGEL) tablet 1,600 mg, 1,600 mg, Oral, TID With Meals, MER Campos, 1,600 mg at 12/30/19 0909    sodium bicarbonate tablet 1,300 mg, 1,300 mg, Oral, BID after meals, Charles Caban MD, 1,300 mg at 12/30/19 6719    spironolactone (ALDACTONE) tablet 25 mg, 25 mg, Oral, Daily, Gerard Khanna MD, 25 mg at 12/30/19 0911    tacrolimus (PROGRAF) capsule 2 mg, 2 mg, Oral, Daily, Seamus Fu DO, 2 mg at 12/30/19 0916    tacrolimus (PROGRAF) capsule 2 mg, 2 mg, Oral, HS, Charles Caban MD, 2 mg at 12/29/19 2044    Laboratory Results:  Results from last 7 days   Lab Units 12/30/19  0532 12/29/19  0626 12/28/19  0501 12/27/19  0617 12/26/19  0428 12/26/19  0333 12/25/19  0748 12/24/19  0518   WBC Thousand/uL 6 43 5 87  --  5 41  --  7 76 6 10  --    HEMOGLOBIN g/dL 7 4* 7 2*  --  7 3*  --  7 2* 7 4*  --    HEMATOCRIT % 23 3* 23 6*  --  24 2*  --  23 0* 23 8*  --    PLATELETS Thousands/uL 243 165  --  157  --  157 156  --    POTASSIUM mmol/L 5 1 4 6 4 4 4 1 3 8  --  4 0 4 0   CHLORIDE mmol/L 113* 114* 114* 112* 113*  --  114* 115*   CO2 mmol/L 19* 21 19* 20* 21  --  20* 18*   BUN mg/dL 40* 40* 39* 39* 40*  --  34* 32*   CREATININE mg/dL 2 71* 2 77* 2 77* 2 83* 2 87*  --  2 94* 2 76*   CALCIUM mg/dL 8 9 8 7 8 9 8 5 8 2*  --  8 3 8 1*   MAGNESIUM mg/dL  --   --  2 6 2 8*  --   --  2 6 2 7*   PHOSPHORUS mg/dL  --   --  4 9* 5 2*  --   --  5 0* 4 4

## 2019-12-30 NOTE — SOCIAL WORK
CM met with pt at bedside to review rehab options  Pt had the following choices: [facility], [facility], and [facility] to [facility] referrals via [address]  [place] following, pt not medically stable for d/c  Pt reviewed with [facility] provider  Pt started on PO diuretics today  Pending rehab placement at d/c

## 2019-12-30 NOTE — PROGRESS NOTES
Progress Note - Chi Morales 1965, 53 y o  female MRN: [id number]    Unit/Bed#: -01 Encounter: [id number]    Primary Care Provider: Mike Gross MD   Date and time admitted to hospital: 12/9/2019  8:29 PM        * Abdominal pain  Assessment & Plan  With history of recurrent UTIs in setting of renal and pancreatic transplant  Most recently with VRE at recent admission to [facility]  Presenting with abdominal pain/nausea and lethargy/confusion  · CT abdomen/pelvis on admission - bowel wall thickening involving the proximal to mid sigmoid and the distal left colon  This may be due to colitis  · Stool cdiff neg x2, enteric panel neg, leucocytes neg  · Checked CMV PCR - negative  · Patient afebrile, WBC normal    · Consulted GI -  Do not suspect colitis  Likely due to constipation/fecal stasis  Abdominal pain improved with MiraLax  GI recommend starting MiraLax daily  Outpatient colonoscopy  Follow-up GI in 1 month  GI signed off  Acute on chronic diastolic congestive heart failure (HCC)  Assessment & Plan  · ProBNP 20,640  2+ edema in lower extremity on admission  · Venous Doppler negative for DVT  · 2D echo - normal EF,G1DD,no RWMA, moderate aortic regurgitation, trace pericardial effusion  · Diuretics per Nephrology, received course of IV Lasix with albumin  Today she was switched to torsemide p o , continue Aldactone  · Continue metoprolol  · Daily weight and I&Os  · Appreciate Nephrology input  Bacteremia  Assessment & Plan  · 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  · ID has been monitoring off antibiotics  · Pt remains afebrile w/o WBC  · Continue to monitor     Asymptomatic bacteriuria  Assessment & Plan  · Recently completed 7 D course of IV daptomycin for VRE UTI    Monitoring off antibiotic therapy  · ID consulted   · Has positive cultures for VRE again, but this is not the cause of her symptoms, its colonization  · Monitor off Abx      Diarrhea  Assessment & Plan  · Likely secondary to constipation/fecal stasis  · Stool culture, C diff negative  · Consulted GI, appreciate input - see above   · Started on MiraLax per GI  Titrate to at least 1 bowel movement every other day and okay to hold for >BM/day  Metabolic acidosis  Assessment & Plan  · Appears acute on chronic  · Bicarb 19 today  · S/P kidney and pancreatic transplant in 1998  · Continue p o  Sodium bicarb and trend BMP  · Patient with an RTA related to her pancreatic transplant per Nephrology  · P o  Bicarb supplementation as per Nephrology, appreciate input  Multiple myeloma not having achieved remission 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)  Assessment & Plan  Lab Results   Component Value Date    HGBA1C 5 1 09/09/2019       Recent Labs     12/29/19  1611 12/29/19  2055 12/30/19  0732 12/30/19  1056   POCGLU 127 105 128 100       Blood Sugar Average: Last 72 hrs:  · Stopped Lantus (in place of [place]o) 5 units qHS due to hypoglycemia  · Cont SSI with accu-cheks and carb controlled diet  · Initiate hypoglycemia protocol and avoid hypoglycemia  · Repeat A1c    Chronic kidney disease, stage 4 (severe) (HCC)  Assessment & Plan  · Baseline creatinine 2 5-3  · Nephrology following, appreciate input   · Diuretics managed by Nephrology  · Now on p o  Bicarb and off bicarb infusion    · Spironolactone added due to hypokalemia  Consider discontinuing spironolactone if creatinine continues to trend up  · Avoid nephrotoxins as able, renally dose medications as indicated  · BMP daily   · Cr at baseline today, 2 7    Renal transplant recipient  Assessment & Plan  · On chronic immunosuppression with tacrolimus and prednisone  · Unfortunately patient noncompliant with all medications since discharge including these  · Tacrolimus level was 4 > 10 > 7 () > 5 () remains stable at 4 4  · Dose decreased to 2 BID on , again decrease to 2 mg QAM & 1 5 mg HS from   · Tacrolimus level was stable on the current dosing  · Last level 4 5        VTE Pharmacologic Prophylaxis:   Pharmacologic: On hold due to ejection side ecchymosis  Mechanical VTE Prophylaxis in Place: No    Patient Centered Rounds: I have performed bedside rounds with nursing staff today  Discussions with Specialists or Other Care Team Provider:  Nephrology    Education and Discussions with Family / Patient:     Time Spent for Care: 30 minutes  More than 50% of total time spent on counseling and coordination of care as described above  Current Length of Stay: 20 day(s)    Current Patient Status: Inpatient   Certification Statement: The patient will continue to require additional inpatient hospital stay due to Switch to p o  Diuretics today, creatinine stable  Pending placement to rehab    Discharge Plan:  If kidney function volume status remained stable, she can go to rehab    Code Status: Level 1 - Full Code      Subjective:   Patient was seen and evaluated bedside, she is feeling better  Lower extremity edema improved    Objective:     Vitals:   Temp (24hrs), Av 4 °F (36 9 °C), Min:98 3 °F (36 8 °C), Max:98 7 °F (37 1 °C)    Temp:  [98 3 °F (36 8 °C)-98 7 °F (37 1 °C)] 98 3 °F (36 8 °C)  HR:  [69-73] 69  Resp:  [16-19] 18  BP: (163-177)/(57-66) 175/64  SpO2:  [96 %-99 %] 96 %  Body mass index is 36 27 kg/m²       Input and Output Summary (last 24 hours): Intake/Output Summary (Last 24 hours) at 12/30/2019 1650  Last data filed at 12/30/2019 1639  Gross per 24 hour   Intake 960 ml   Output 3775 ml   Net -2815 ml       Physical Exam:     Physical Exam   Constitutional: She is oriented to person, place, and time  She appears well-developed and well-nourished  No distress  HENT:   Head: Atraumatic  Eyes: EOM are normal    Neck: Neck supple  Cardiovascular: Normal rate, regular rhythm and normal heart sounds  Exam reveals no friction rub  No murmur heard  Pulmonary/Chest: Effort normal and breath sounds normal  No respiratory distress  She has no wheezes  Abdominal: Soft  Bowel sounds are normal  She exhibits no distension  There is no tenderness  Musculoskeletal: Normal range of motion  1+ pitting edema   Neurological: She is alert and oriented to person, place, and time  No cranial nerve deficit  Skin: Skin is warm and dry  Psychiatric: She has a normal mood and affect  Additional Data:     Labs:    Results from last 7 days   Lab Units 12/30/19  0532   WBC Thousand/uL 6 43   HEMOGLOBIN g/dL 7 4*   HEMATOCRIT % 23 3*   PLATELETS Thousands/uL 243   NEUTROS PCT % 54   LYMPHS PCT % 24   MONOS PCT % 11   EOS PCT % 9*     Results from last 7 days   Lab Units 12/30/19  0532  12/27/19  0617   SODIUM mmol/L 139   < > 141   POTASSIUM mmol/L 5 1   < > 4 1   CHLORIDE mmol/L 113*   < > 112*   CO2 mmol/L 19*   < > 20*   BUN mg/dL 40*   < > 39*   CREATININE mg/dL 2 71*   < > 2 83*   ANION GAP mmol/L 7   < > 9   CALCIUM mg/dL 8 9   < > 8 5   ALBUMIN g/dL  --   --  2 7*   TOTAL BILIRUBIN mg/dL  --   --  0 34   ALK PHOS U/L  --   --  89   ALT U/L  --   --  13   AST U/L  --   --  9   GLUCOSE RANDOM mg/dL 100   < > 108    < > = values in this interval not displayed           Results from last 7 days   Lab Units 12/30/19  1056 12/30/19  0732 12/29/19  2055 12/29/19  1611 12/29/19  1100 12/29/19  0733 12/28/19  2103 12/28/19  1601 12/28/19  1112 12/28/19  0724 12/27/19  2109 12/27/19  1623   POC GLUCOSE mg/dl 100 128 105 127 187* 129 102 112 111 107 101 132                   * I Have Reviewed All Lab Data Listed Above  * Additional Pertinent Lab Tests Reviewed:  Ambrocio Cates Admission Reviewed    Imaging:    Imaging Reports Reviewed Today Include: all  Imaging Personally Reviewed by Myself Includes:      Recent Cultures (last 7 days):           Last 24 Hours Medication List:     Current Facility-Administered Medications:  acetaminophen 650 mg Oral Q6H PRN Kourtney Ortiz PA-C   [START ON 12/31/2019] amLODIPine 10 mg Oral Daily MER Orozco   ARIPiprazole 30 mg Oral HS Provo Alias, DO   aspirin 81 mg Oral Daily Provo Alias, DO   busPIRone 5 mg Oral BID Provo Alias, DO   cholecalciferol 3,000 Units Oral Daily Provo Alias, DO   cloNIDine 0 2 mg Oral Q8H [facility] Boni Leigh MD   doxazosin 2 mg Oral HS Nilesh Howell MD   DULoxetine 60 mg Oral BID Provo Alias, DO   ferrous sulfate 325 mg Oral Daily With Breakfast Provo Alias, DO   folic acid 8,602 mcg Oral Daily Provo Alias, DO   hydrALAZINE 5 mg Intravenous Q6H PRN Raul Chavez MD   hydrALAZINE 50 mg Oral Q8H [facility] Nilesh Howell MD   insulin lispro 1-5 Units Subcutaneous HS Provo Alias, DO   insulin lispro 1-6 Units Subcutaneous TID AC Raul Chavez MD   levothyroxine 125 mcg Oral Early Morning Provo Alias, DO   LORazepam 2 mg Oral TID PRN Provo Alias, DO   metoprolol tartrate 50 mg Oral Q12H [facility] Provo Alias, DO   ondansetron 4 mg Intravenous Q4H PRN Manjula Rivera MD   polyethylene glycol 34 g Oral Daily Arsen Garcia MD   pravastatin 80 mg Oral Daily Provo Alias, DO   predniSONE 5 mg Oral Daily Provo Alias, DO   rOPINIRole 0 25 mg Oral BID Provo Alias, DO   saccharomyces boulardii 250 mg Oral BID Provo Alias, DO   sertraline 200 mg Oral Daily Provo Alias, DO   sevelamer 1,600 mg Oral TID With Meals [facility] MER Scott   sodium bicarbonate 1,300 mg Oral BID after meals Bill Dillard MD   spironolactone 25 mg Oral Daily Cody Sharp MD   tacrolimus 2 mg Oral Daily Seamus Fu DO   tacrolimus 2 mg Oral HS Bill Dillard MD   torsemide 20 mg Oral Daily Tamy Sanchez DO        Today, Patient Was Seen By: Vania Johnson MD    ** Please Note: Dictation voice to text software may have been used in the creation of this document   **

## 2019-12-30 NOTE — ASSESSMENT & PLAN NOTE
· Follows with Dr Zachary Murdock as outpatient  · Currently off Revlimid  · Hemoglobin stable between 7-8  · Known history of transfusions in past   Has multiple autoantibodies

## 2019-12-31 PROBLEM — R19.7 DIARRHEA: Status: RESOLVED | Noted: 2019-12-03 | Resolved: 2019-12-31

## 2019-12-31 PROBLEM — E83.39 HYPERPHOSPHATEMIA: Status: ACTIVE | Noted: 2019-12-31

## 2019-12-31 LAB
ANION GAP SERPL CALCULATED.3IONS-SCNC: 8 MMOL/L (ref 4–13)
BASOPHILS # BLD AUTO: 0.06 THOUSANDS/ΜL (ref 0–0.1)
BASOPHILS NFR BLD AUTO: 1 % (ref 0–1)
BUN SERPL-MCNC: 37 MG/DL (ref 5–25)
CALCIUM SERPL-MCNC: 8.8 MG/DL (ref 8.3–10.1)
CHLORIDE SERPL-SCNC: 115 MMOL/L (ref 100–108)
CO2 SERPL-SCNC: 18 MMOL/L (ref 21–32)
CREAT SERPL-MCNC: 2.54 MG/DL (ref 0.6–1.3)
EOSINOPHIL # BLD AUTO: 0.54 THOUSAND/ΜL (ref 0–0.61)
EOSINOPHIL NFR BLD AUTO: 9 % (ref 0–6)
ERYTHROCYTE [DISTWIDTH] IN BLOOD BY AUTOMATED COUNT: 17.1 % (ref 11.6–15.1)
GFR SERPL CREATININE-BSD FRML MDRD: 21 ML/MIN/1.73SQ M
GLUCOSE SERPL-MCNC: 102 MG/DL (ref 65–140)
GLUCOSE SERPL-MCNC: 110 MG/DL (ref 65–140)
GLUCOSE SERPL-MCNC: 112 MG/DL (ref 65–140)
GLUCOSE SERPL-MCNC: 121 MG/DL (ref 65–140)
GLUCOSE SERPL-MCNC: 145 MG/DL (ref 65–140)
HCT VFR BLD AUTO: 25.8 % (ref 34.8–46.1)
HGB BLD-MCNC: 8 G/DL (ref 11.5–15.4)
IMM GRANULOCYTES # BLD AUTO: 0.05 THOUSAND/UL (ref 0–0.2)
IMM GRANULOCYTES NFR BLD AUTO: 1 % (ref 0–2)
LYMPHOCYTES # BLD AUTO: 1.64 THOUSANDS/ΜL (ref 0.6–4.47)
LYMPHOCYTES NFR BLD AUTO: 26 % (ref 14–44)
MCH RBC QN AUTO: 32.7 PG (ref 26.8–34.3)
MCHC RBC AUTO-ENTMCNC: 31 G/DL (ref 31.4–37.4)
MCV RBC AUTO: 105 FL (ref 82–98)
MONOCYTES # BLD AUTO: 0.57 THOUSAND/ΜL (ref 0.17–1.22)
MONOCYTES NFR BLD AUTO: 9 % (ref 4–12)
NEUTROPHILS # BLD AUTO: 3.45 THOUSANDS/ΜL (ref 1.85–7.62)
NEUTS SEG NFR BLD AUTO: 54 % (ref 43–75)
NRBC BLD AUTO-RTO: 0 /100 WBCS
PHOSPHATE SERPL-MCNC: 5.1 MG/DL (ref 2.7–4.5)
PLATELET # BLD AUTO: 178 THOUSANDS/UL (ref 149–390)
PMV BLD AUTO: 12.5 FL (ref 8.9–12.7)
POTASSIUM SERPL-SCNC: 4.3 MMOL/L (ref 3.5–5.3)
RBC # BLD AUTO: 2.45 MILLION/UL (ref 3.81–5.12)
SODIUM SERPL-SCNC: 141 MMOL/L (ref 136–145)
WBC # BLD AUTO: 6.31 THOUSAND/UL (ref 4.31–10.16)

## 2019-12-31 PROCEDURE — 99232 SBSQ HOSP IP/OBS MODERATE 35: CPT | Performed by: PHYSICIAN ASSISTANT

## 2019-12-31 PROCEDURE — 82948 REAGENT STRIP/BLOOD GLUCOSE: CPT

## 2019-12-31 PROCEDURE — 80048 BASIC METABOLIC PNL TOTAL CA: CPT | Performed by: NURSE PRACTITIONER

## 2019-12-31 PROCEDURE — 84100 ASSAY OF PHOSPHORUS: CPT | Performed by: NURSE PRACTITIONER

## 2019-12-31 PROCEDURE — 99232 SBSQ HOSP IP/OBS MODERATE 35: CPT | Performed by: INTERNAL MEDICINE

## 2019-12-31 PROCEDURE — 85025 COMPLETE CBC W/AUTO DIFF WBC: CPT | Performed by: INTERNAL MEDICINE

## 2019-12-31 RX ORDER — SODIUM BICARBONATE 650 MG/1
1300 TABLET ORAL
Status: DISCONTINUED | OUTPATIENT
Start: 2019-12-31 | End: 2020-01-02 | Stop reason: HOSPADM

## 2019-12-31 RX ADMIN — CLONIDINE HYDROCHLORIDE 0.2 MG: 0.1 TABLET ORAL at 05:39

## 2019-12-31 RX ADMIN — DULOXETINE HYDROCHLORIDE 60 MG: 60 CAPSULE, DELAYED RELEASE ORAL at 08:16

## 2019-12-31 RX ADMIN — TACROLIMUS 2 MG: 1 CAPSULE ORAL at 21:32

## 2019-12-31 RX ADMIN — HYDRALAZINE HYDROCHLORIDE 5 MG: 20 INJECTION INTRAMUSCULAR; INTRAVENOUS at 23:42

## 2019-12-31 RX ADMIN — DULOXETINE HYDROCHLORIDE 60 MG: 60 CAPSULE, DELAYED RELEASE ORAL at 17:13

## 2019-12-31 RX ADMIN — BUSPIRONE HYDROCHLORIDE 5 MG: 5 TABLET ORAL at 08:16

## 2019-12-31 RX ADMIN — SPIRONOLACTONE 25 MG: 25 TABLET ORAL at 08:17

## 2019-12-31 RX ADMIN — ASPIRIN 81 MG 81 MG: 81 TABLET ORAL at 08:16

## 2019-12-31 RX ADMIN — SEVELAMER HYDROCHLORIDE 1600 MG: 800 TABLET, FILM COATED PARENTERAL at 12:26

## 2019-12-31 RX ADMIN — METOPROLOL TARTRATE 50 MG: 50 TABLET, FILM COATED ORAL at 08:15

## 2019-12-31 RX ADMIN — ROPINIROLE 0.25 MG: 0.25 TABLET, FILM COATED ORAL at 17:13

## 2019-12-31 RX ADMIN — POLYETHYLENE GLYCOL 3350 34 G: 17 POWDER, FOR SOLUTION ORAL at 08:15

## 2019-12-31 RX ADMIN — ARIPIPRAZOLE 30 MG: 10 TABLET ORAL at 21:31

## 2019-12-31 RX ADMIN — PRAVASTATIN SODIUM 80 MG: 80 TABLET ORAL at 08:15

## 2019-12-31 RX ADMIN — TACROLIMUS 2 MG: 1 CAPSULE ORAL at 08:18

## 2019-12-31 RX ADMIN — SERTRALINE HYDROCHLORIDE 200 MG: 100 TABLET ORAL at 08:16

## 2019-12-31 RX ADMIN — HYDRALAZINE HYDROCHLORIDE 50 MG: 50 TABLET, FILM COATED ORAL at 21:31

## 2019-12-31 RX ADMIN — SODIUM BICARBONATE 650 MG TABLET 1300 MG: at 17:14

## 2019-12-31 RX ADMIN — SEVELAMER HYDROCHLORIDE 1600 MG: 800 TABLET, FILM COATED PARENTERAL at 08:15

## 2019-12-31 RX ADMIN — TORSEMIDE 20 MG: 20 TABLET ORAL at 08:17

## 2019-12-31 RX ADMIN — ROPINIROLE 0.25 MG: 0.25 TABLET, FILM COATED ORAL at 08:16

## 2019-12-31 RX ADMIN — LEVOTHYROXINE SODIUM 125 MCG: 125 TABLET ORAL at 05:39

## 2019-12-31 RX ADMIN — DOXAZOSIN 2 MG: 2 TABLET ORAL at 21:31

## 2019-12-31 RX ADMIN — CLONIDINE HYDROCHLORIDE 0.2 MG: 0.1 TABLET ORAL at 14:57

## 2019-12-31 RX ADMIN — FERROUS SULFATE TAB 325 MG (65 MG ELEMENTAL FE) 325 MG: 325 (65 FE) TAB at 08:17

## 2019-12-31 RX ADMIN — MELATONIN 3000 UNITS: at 08:15

## 2019-12-31 RX ADMIN — Medication 250 MG: at 08:15

## 2019-12-31 RX ADMIN — CLONIDINE HYDROCHLORIDE 0.2 MG: 0.1 TABLET ORAL at 21:31

## 2019-12-31 RX ADMIN — BUSPIRONE HYDROCHLORIDE 5 MG: 5 TABLET ORAL at 17:14

## 2019-12-31 RX ADMIN — HYDRALAZINE HYDROCHLORIDE 50 MG: 50 TABLET, FILM COATED ORAL at 14:57

## 2019-12-31 RX ADMIN — Medication 250 MG: at 17:14

## 2019-12-31 RX ADMIN — SEVELAMER HYDROCHLORIDE 1600 MG: 800 TABLET, FILM COATED PARENTERAL at 17:13

## 2019-12-31 RX ADMIN — SODIUM BICARBONATE 650 MG TABLET 1300 MG: at 08:15

## 2019-12-31 RX ADMIN — FOLIC ACID 1000 MCG: 1 TABLET ORAL at 08:16

## 2019-12-31 RX ADMIN — HYDRALAZINE HYDROCHLORIDE 50 MG: 50 TABLET, FILM COATED ORAL at 05:39

## 2019-12-31 RX ADMIN — METOPROLOL TARTRATE 50 MG: 50 TABLET, FILM COATED ORAL at 21:31

## 2019-12-31 RX ADMIN — AMLODIPINE BESYLATE 10 MG: 10 TABLET ORAL at 08:29

## 2019-12-31 RX ADMIN — PREDNISONE 5 MG: 5 TABLET ORAL at 08:17

## 2019-12-31 NOTE — PROGRESS NOTES
Progress Note - Nephrology   Robert Stevenson 53 y o  female MRN: [id number]  Unit/Bed#: -01 Encounter: [id number]      Assessment / Plan:  1  EDUARDO - resolved  2  CKD 4/chronic allograft dysfunction - b/l sCr 2 5-3, at baseline  Continue current IS  Monitor BMP  Needs close follow-up with Dr Juanis Alvarez as an outpatient  She should follow her daily weights as well  3  LE edema in setting of acute on chronic dCHF - will begin oral diuretic daily starting today  Monitor BMP/daily weight  Does have significant lower leg edema  As BP higher range, will avoid further albumin administration  Continue aldactone 25mg daily and torsemide 20mg daily  4  HTN with CKD 4 - continue amlodipine 10 mg daily, clonidine 0 2 mg p o  q8 hours, Cardura 2 mg at bedtime, hydralazine 50 mg p o  Q 8 hours, metoprolol 50 mg p o  Q 12 hours, spironolactone 25 mg p o  Daily, Demadex 20 mg p o  daily  5  Metabolic acidosis in setting of EDUARDO on CKD and with GI loss of bicarb from diarrhea - increase bicarbonate 1300mg po BID to TID  6  Hyperphosphatemia-continue Renagel 2 tabs t i d  With meals, monitor phos, last phos 5 1  7  Diarrhea-thought to be due to constipation/fecal stasis, C diff negative  8  Multiple myeloma, diagnosed in 2019-follows with Dr Jaylan Barclay outpatient, off Revlimid  9  Anemia of chronic disease in setting of multiple myeloma-would avoid LEO, continue oral iron, hemoglobin 8      Subjective:   She denies any chest pain or shortness of breath  She has ongoing loose stool  She states her leg edema is slightly improved  No other complaints  Objective:     Vitals: Blood pressure 157/57, pulse 64, temperature 98 4 °F (36 9 °C), temperature source Oral, resp  rate 18, height 5' 8" (1 727 m), weight 101 kg (222 lb 3 6 oz), SpO2 95 %  ,Body mass index is 33 79 kg/m²  Temp (24hrs), Av 4 °F (36 9 °C), Min:98 3 °F (36 8 °C), Max:98 4 °F (36 9 °C)      Weight (last 2 days)     Date/Time   Weight    19 0600   101 (222 22)    12/30/19 0600   108 (238 54)    12/29/19 0539   108 (237 44)                Intake/Output Summary (Last 24 hours) at 12/31/2019 1229  Last data filed at 12/31/2019 1100  Gross per 24 hour   Intake 640 ml   Output 2475 ml   Net -1835 ml     I/O last 24 hours: In: 0 [P O :880]  Out: 2975 [Urine:2975]        Physical Exam:   Physical Exam   Constitutional: She appears well-developed and well-nourished  No distress  HENT:   Head: Normocephalic and atraumatic  Mouth/Throat: No oropharyngeal exudate  Eyes: Right eye exhibits no discharge  Left eye exhibits no discharge  No scleral icterus  Neck: Normal range of motion  Neck supple  No thyromegaly present  Cardiovascular: Normal rate and regular rhythm  No murmur heard  Pulmonary/Chest: Effort normal and breath sounds normal  No respiratory distress  She has no wheezes  Abdominal: Soft  Bowel sounds are normal  She exhibits no distension  Musculoskeletal: She exhibits edema (b/l LE pitting)  Neurological: She is alert  She exhibits normal muscle tone  awake   Skin: Skin is warm and dry  No rash noted  She is not diaphoretic  Psychiatric: She has a normal mood and affect  Her behavior is normal    Nursing note and vitals reviewed        Invasive Devices     Peripheral Intravenous Line            Peripheral IV 12/30/19 Right Hand 1 day                Medications:    Scheduled Meds:  Current Facility-Administered Medications:  acetaminophen 650 mg Oral Q6H PRN Kourtney Ortiz PA-C   amLODIPine 10 mg Oral Daily MER Orozco   ARIPiprazole 30 mg Oral HS [place] Alias, DO   aspirin 81 mg Oral Daily [place] Ali[place], DO   busPIRone 5 mg Oral BID [place] Alias, DO   cholecalciferol 3,000 Units Oral Daily [place] Alias, DO   cloNIDine 0 2 mg Oral [facility] Boni Leigh MD   doxazosin 2 mg Oral HS Nilesh Howell MD   DULoxetine 60 mg Oral BID [place] Alias, DO   ferrous sulfate 325 mg Oral Daily With Breakfast Franca Tan Mayelin, DO   folic acid 2,391 mcg Oral Daily Lemon Hopping, DO   hydrALAZINE 5 mg Intravenous Q6H PRN Lovell Phoenix, MD   hydrALAZINE 50 mg Oral Q8H [address] Chantal Guardado MD   insulin lispro 1-5 Units Subcutaneous HS Lemon Hopping, DO   insulin lispro 1-6 Units Subcutaneous TID AC Lovell Phoenix, MD   levothyroxine 125 mcg Oral Early Morning Lemon Hopping, DO   LORazepam 2 mg Oral TID PRN Lemon Hopping, DO   metoprolol tartrate 50 mg Oral Q12H [address] Lemon Hopping, DO   ondansetron 4 mg Intravenous Q4H PRN Miryam Zamorano MD   polyethylene glycol 34 g Oral Daily Ishaalygeorge Elizabeth MD   pravastatin 80 mg Oral Daily Lemon Hopping, DO   predniSONE 5 mg Oral Daily Lemon Hopping, DO   rOPINIRole 0 25 mg Oral BID Lemon Hopping, DO   saccharomyces boulardii 250 mg Oral BID Lemon Hopping, DO   sertraline 200 mg Oral Daily Lemon Hopping, DO   sevelamer 1,600 mg Oral TID With Meals MER Campos   sodium bicarbonate 1,300 mg Oral BID after meals Julian Schrader MD   spironolactone 25 mg Oral Daily Janak Jolly MD   tacrolimus 2 mg Oral Daily Joekathy Calvinadriano, DO   tacrolimus 2 mg Oral HS Julian Schrader MD   torsemide 20 mg Oral Daily Tmay Evangelista, DO       PRN Meds:   acetaminophen    hydrALAZINE    LORazepam    ondansetron    Continuous Infusions:         LAB RESULTS:      Results from last 7 days   Lab Units 12/31/19  0445 12/30/19  0532 12/29/19  4469 12/28/19  0501 12/27/19  0617 12/26/19  0428 12/26/19  0333 12/25/19  0748   WBC Thousand/uL 6 31 6 43 5 87  --  5 41  --  7 76 6 10   HEMOGLOBIN g/dL 8 0* 7 4* 7 2*  --  7 3*  --  7 2* 7 4*   HEMATOCRIT % 25 8* 23 3* 23 6*  --  24 2*  --  23 0* 23 8*   PLATELETS Thousands/uL 178 243 165  --  157  --  157 156   NEUTROS PCT % 54 54 54  --   --   --   --   --    LYMPHS PCT % 26 24 25  --   --   --   --   --    MONOS PCT % 9 11 10  --   --   --   --   --    EOS PCT % 9* 9* 9*  --   --   --   --   --    POTASSIUM mmol/L 4 3 5 1 4 6 4 4 4 1 3 8  --  4 0   CHLORIDE mmol/L 115* 113* 114* 114* 112* 113*  --  114*   CO2 mmol/L 18* 19* 21 19* 20* 21  --  20*   BUN mg/dL 37* 40* 40* 39* 39* 40*  --  34*   CREATININE mg/dL 2 54* 2 71* 2 77* 2 77* 2 83* 2 87*  --  2 94*   CALCIUM mg/dL 8 8 8 9 8 7 8 9 8 5 8 2*  --  8 3   ALK PHOS U/L  --   --   --   --  89  --   --   --    ALT U/L  --   --   --   --  13  --   --   --    AST U/L  --   --   --   --  9  --   --   --    MAGNESIUM mg/dL  --   --   --  2 6 2 8*  --   --  2 6   PHOSPHORUS mg/dL 5 1*  --   --  4 9* 5 2*  --   --  5 0*       CUTURES:  Lab Results   Component Value Date    BLOODCX No Growth After 5 Days  12/14/2019    BLOODCX Porphyromonas asaccharolytica (A) 12/09/2019    BLOODCX Porphyromonas asaccharolytica (A) 12/09/2019    BLOODCX No Growth After 5 Days  10/01/2019    BLOODCX No Growth After 5 Days  10/01/2019    BLOODCX No Growth After 5 Days  11/16/2017    BLOODCX No Growth After 5 Days  09/13/2017    URINECX (A) 12/09/2019     >100,000 cfu/ml Vancomycin Resistant Enterococcus faecium    URINECX (A) 12/09/2019     2594-3162 cfu/ml Gram-negative cintia- species    URINECX (A) 11/24/2019     >100,000 cfu/ml Vancomycin Resistant Enterococcus faecium    URINECX No Growth <1000 cfu/mL 11/05/2019    URINECX <10,000 cfu/ml  11/04/2019    URINECX >100,000 cfu/ml Escherichia coli (A) 10/23/2019    URINECX 10,000-19,000 cfu/ml  10/23/2019                 Portions of the record may have been created with voice recognition software  Occasional wrong word or "sound a like" substitutions may have occurred due to the inherent limitations of voice recognition software  Read the chart carefully and recognize, using context, where substitutions have occurred  If you have any questions, please contact the dictating provider

## 2019-12-31 NOTE — ASSESSMENT & PLAN NOTE
· Blood pressures have remained variable  Patient currently on clonidine 0 2 mg TID, hydralazine 50 mg Q8, Cardura 20 mg HS, metoprolol tartrate 50 mg BID, Amlodipine 10 mg daily, spironolactone 25 mg daily and torsemide 20 mg daily

## 2019-12-31 NOTE — ASSESSMENT & PLAN NOTE
· Likely secondary to constipation/fecal stasis  · Stool culture, C diff negative  · Obstruction series (12/27/19): Nonobstructive bowel gas pattern with decreased colonic stool burden  · Started on MiraLax 34 g daily per GI  Now with 5-6 episodes of loose stools per day  · Will discontinue Miralax for now  Once loose stools have resolved, would resume 17 g/day

## 2019-12-31 NOTE — ASSESSMENT & PLAN NOTE
With history of recurrent UTIs in setting of renal and pancreatic transplant  Most recently with VRE at recent admission to [facility]  Presenting with abdominal pain/nausea and lethargy/confusion  · CT abdomen/pelvis on admission - bowel wall thickening involving the proximal to mid sigmoid and the distal left colon  · Consulted GI -  Do not suspect colitis  Likely due to constipation/fecal stasis  Abdominal pain improved with MiraLax  GI recommend starting MiraLax daily  Outpatient colonoscopy  Follow-up GI in 1 month  GI signed off    · Stool cdiff neg x2, enteric panel neg, leucocytes neg  · CMV PCR - negative

## 2019-12-31 NOTE — ASSESSMENT & PLAN NOTE
· Follows with Dr Francine Aj as outpatient  · Currently off Revlimid  · Hemoglobin stable between 7-8  · Known history of transfusions in past   Has multiple autoantibodies

## 2019-12-31 NOTE — PLAN OF CARE
Problem: Potential for Falls  Goal: Patient will remain free of falls  Description  INTERVENTIONS:  - Assess patient frequently for physical needs  -  Identify cognitive and physical deficits and behaviors that affect risk of falls  -  [place] fall precautions as indicated by assessment   - Educate patient/family on patient safety including physical limitations  - Instruct patient to call for assistance with activity based on assessment  - Modify environment to reduce risk of injury  - Consider OT/PT consult to assist with strengthening/mobility  Outcome: Progressing     Problem: Nutrition/Hydration-ADULT  Goal: Nutrient/Hydration intake appropriate for improving, restoring or maintaining nutritional needs  Description  Monitor and assess patient's nutrition/hydration status for malnutrition  Collaborate with interdisciplinary team and initiate plan and interventions as ordered  Monitor patient's weight and dietary intake as ordered or per policy  Utilize nutrition screening tool and intervene as necessary  Determine patient's food preferences and provide high-protein, high-caloric foods as appropriate       INTERVENTIONS:  - Monitor oral intake, urinary output, labs, and treatment plans  - Assess nutrition and hydration status and recommend course of action  - Evaluate amount of meals eaten  - Assist patient with eating if necessary   - Allow adequate time for meals  - Recommend/ encourage appropriate diets, oral nutritional supplements, and vitamin/mineral supplements  - Order, calculate, and assess calorie counts as needed  - Recommend, monitor, and adjust tube feedings and TPN/PPN based on assessed needs  - Assess need for intravenous fluids  - Provide specific nutrition/hydration education as appropriate  - Include patient/family/caregiver in decisions related to nutrition   Outcome: Progressing     Problem: PAIN - ADULT  Goal: Verbalizes/displays adequate comfort level or baseline comfort level  Description  Interventions:  - Encourage patient to monitor pain and request assistance  - Assess pain using appropriate pain scale  - Administer analgesics based on type and severity of pain and evaluate response  - Implement non-pharmacological measures as appropriate and evaluate response  - Consider cultural and social influences on pain and pain management  - Notify physician/advanced practitioner if interventions unsuccessful or patient reports new pain  Outcome: Progressing

## 2019-12-31 NOTE — ASSESSMENT & PLAN NOTE
Lab Results   Component Value Date    HGBA1C 5 1 09/09/2019     Recent Labs     12/30/19  1056 12/30/19  1615 12/30/19  2119 12/31/19  0615   POCGLU 100 132 108 110     Blood Sugar Average: Last 72 hrs:  · Stopped Lantus (in place of Toujeo) 5 units qHS due to hypoglycemia  · Cont SSI with accu-cheks and carb controlled diet

## 2019-12-31 NOTE — PROGRESS NOTES
Progress Note - [employer] 1965, 53 y o  female MRN: [id number]  Unit/Bed#: -01 Encounter: [id number]  Primary Care Provider: Nel Means MD   Date and time admitted to hospital: 12/9/2019  8:29 PM    * Acute on chronic diastolic congestive heart failure ([facility] [address] )  Assessment & Plan  · ProBNP 20,640  2+ edema in lower extremity on admission  · Venous Doppler negative for DVT  · 2D echo - normal EF,G1DD,no RWMA, moderate aortic regurgitation, trace pericardial effusion  · Diuretics per Nephrology, received course of IV Lasix with albumin  Has been switched to PO torsemide  Continue Aldactone  · Continue metoprolol  · Daily weight and I&Os  Bacteremia  Assessment & Plan  · 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  · ID has been monitoring off antibiotics  · Pt remains afebrile w/o WBC  · Continue to monitor     Asymptomatic bacteriuria  Assessment & Plan  · Recently completed 7 D course of IV daptomycin for VRE UTI  Monitoring off antibiotic therapy  · ID consulted   · Has positive cultures for VRE again, but this is not the cause of her symptoms, its colonization  · Monitor off Abx      Diarrhea  Assessment & Plan  · Likely secondary to constipation/fecal stasis  · Stool culture, C diff negative  · Obstruction series (12/27/19): Nonobstructive bowel gas pattern with decreased colonic stool burden  · Started on MiraLax 34 g daily per GI  Now with 5-6 episodes of loose stools per day  · Will discontinue Miralax for now  Once loose stools have resolved, would resume 17 g/day  Multiple myeloma not having achieved remission [facility])  Assessment & Plan  · Follows with Dr Arnulfo Henry as outpatient  · Currently off Revlimid  · Hemoglobin stable between 7-8  · Known history of transfusions in past   Has multiple autoantibodies  Essential hypertension  Assessment & Plan  · Blood pressures have remained variable    Patient currently on clonidine 0 2 mg TID, hydralazine 50 mg Q8, Cardura 20 mg HS, metoprolol tartrate 50 mg BID, Amlodipine 10 mg daily, spironolactone 25 mg daily and torsemide 20 mg daily  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 [facility])  Assessment & Plan  Lab Results   Component Value Date    HGBA1C 5 1 09/09/2019     Recent Labs     12/30/19  1056 12/30/19  1615 12/30/19  2119 12/31/19  0615   POCGLU 100 132 108 110     Blood Sugar Average: Last 72 hrs:  · Stopped Lantus (in place of Toujeo) 5 units qHS due to hypoglycemia  · Cont SSI with accu-cheks and carb controlled diet    Colitis  Assessment & Plan  With history of recurrent UTIs in setting of renal and pancreatic transplant  Most recently with VRE at recent admission to [facility]  Presenting with abdominal pain/nausea and lethargy/confusion  · CT abdomen/pelvis on admission - bowel wall thickening involving the proximal to mid sigmoid and the distal left colon  · Consulted GI -  Do not suspect colitis  Likely due to constipation/fecal stasis  Abdominal pain improved with MiraLax  GI recommend starting MiraLax daily  Outpatient colonoscopy  Follow-up GI in 1 month  GI signed off  · Stool cdiff neg x2, enteric panel neg, leucocytes neg  · CMV PCR - negative    Chronic kidney disease, stage 4 (severe) ([facility])  Assessment & Plan  · Baseline creatinine 2 5-3  · Creatinine today: 2 54  · Nephrology following and appreciate their input  · Diuretics managed by Nephrology  · Now on p o  Bicarb and off bicarb infusion  · Spironolactone added due to hypokalemia  Consider discontinuing spironolactone if creatinine continues to trend up    · Avoid nephrotoxins as able, renally dose medications as indicated    Renal transplant recipient  Assessment & Plan  · On chronic immunosuppression with tacrolimus and prednisone  · Unfortunately patient noncompliant with all medications since discharge including these  · Tacrolimus level was 4 > 10 > 7 (12/12) > 5 (12/14) remains stable at 4  4  · Dose decreased to 2 BID on , again decrease to 2 mg QAM & 1 5 mg HS from   · Tacrolimus level was stable on the current dosing  · Last level 4 5      VTE Pharmacologic Prophylaxis:   Pharmacologic: AC has been held secondary to significant ecchymosis at injection site  Mechanical: Mechanical VTE prophylaxis in place  Patient Centered Rounds: I have performed bedside rounds with nursing staff today  Discussions with Specialists or Other Care Team Provider: Case management  Education and Discussions with Family / Patient: Called and spoke with patient's father, Christina Worthington  Provided an update and answered all questions  Time Spent for Care: 30 minutes  More than 50% of total time spent on counseling and coordination of care as described above  Current Length of Stay: 21 day(s)  Current Patient Status: Inpatient   Certification Statement: Possible discharge later today  Discharge Plan: Awaiting SNF placement  Patient is now medically stable for discharge when bed available  Code Status: Level 1 - Full Code    Subjective:   Patient complaining of frequent loose stools and is concerned that she is "taking 2 packets" of stool softeners  Her leg edema persists  Objective:   Vitals:   Temp (24hrs), Av 4 °F (36 9 °C), Min:98 3 °F (36 8 °C), Max:98 4 °F (36 9 °C)    Temp:  [98 3 °F (36 8 °C)-98 4 °F (36 9 °C)] 98 4 °F (36 9 °C)  HR:  [64-67] 64  Resp:  [18] 18  BP: (157-180)/(40-67) 157/57  SpO2:  [95 %] 95 %  Body mass index is 33 79 kg/m²  Input and Output Summary (last 24 hours): Intake/Output Summary (Last 24 hours) at 2019 0946  Last data filed at 2019 0541  Gross per 24 hour   Intake 520 ml   Output 2575 ml   Net -2055 ml       Physical Exam:     Physical Exam   Constitutional:   Sitting in bedside chair  HENT:   Head: Normocephalic and atraumatic  Mouth/Throat: Oropharynx is clear and moist and mucous membranes are normal    Eyes: No scleral icterus  Cardiovascular: Normal rate and regular rhythm  No murmur heard  Pulmonary/Chest: Breath sounds normal  She has no wheezes  She has no rales  She exhibits no tenderness  Abdominal: Soft  Bowel sounds are normal  She exhibits no distension  There is no tenderness  Musculoskeletal: Normal range of motion  She exhibits no edema (3+ bilateral lower extremities)  Skin: Skin is warm and dry  No rash noted  Psychiatric: She has a normal mood and affect  Vitals reviewed  Additional Data:   Labs:  Results from last 7 days   Lab Units 12/31/19  0445   WBC Thousand/uL 6 31   HEMOGLOBIN g/dL 8 0*   HEMATOCRIT % 25 8*   PLATELETS Thousands/uL 178   NEUTROS PCT % 54   LYMPHS PCT % 26   MONOS PCT % 9   EOS PCT % 9*     Results from last 7 days   Lab Units 12/31/19  0445  12/27/19  0617   POTASSIUM mmol/L 4 3   < > 4 1   CHLORIDE mmol/L 115*   < > 112*   CO2 mmol/L 18*   < > 20*   BUN mg/dL 37*   < > 39*   CREATININE mg/dL 2 54*   < > 2 83*   CALCIUM mg/dL 8 8   < > 8 5   ALK PHOS U/L  --   --  89   ALT U/L  --   --  13   AST U/L  --   --  9    < > = values in this interval not displayed  * I Have Reviewed All Lab Data Listed Above  * Additional Pertinent Lab Tests Reviewed: All Labs Within Last 24 Hours Reviewed    Imaging:    Imaging Reports Reviewed Today Include: None new    Cultures:   Blood Culture:   Lab Results   Component Value Date    BLOODCX No Growth After 5 Days  12/14/2019    BLOODCX Porphyromonas asaccharolytica (A) 12/09/2019    BLOODCX Porphyromonas asaccharolytica (A) 12/09/2019    BLOODCX No Growth After 5 Days  10/01/2019    BLOODCX No Growth After 5 Days  10/01/2019    BLOODCX No Growth After 5 Days  11/16/2017    BLOODCX No Growth After 5 Days   09/13/2017     Urine Culture:   Lab Results   Component Value Date    URINECX (A) 12/09/2019     >100,000 cfu/ml Vancomycin Resistant Enterococcus faecium    URINECX (A) 12/09/2019     8186-8734 cfu/ml Gram-negative cintia- species URINECX (A) 11/24/2019     >100,000 cfu/ml Vancomycin Resistant Enterococcus faecium    URINECX No Growth <1000 cfu/mL 11/05/2019    URINECX <10,000 cfu/ml  11/04/2019    URINECX >100,000 cfu/ml Escherichia coli (A) 10/23/2019    URINECX 10,000-19,000 cfu/ml  10/23/2019     Sputum Culture: No components found for: SPUTUMCX  Wound Culture: No results found for: WOUNDCULT    Last 24 Hours Medication List:     Current Facility-Administered Medications:  acetaminophen 650 mg Oral Q6H PRN Kourtney Ortiz PA-C   amLODIPine 10 mg Oral Daily MER Beckman   ARIPiprazole 30 mg Oral HS Stevenson Garduno, DO   aspirin 81 mg Oral Daily Stevenson Garduno, DO   busPIRone 5 mg Oral BID Stevenson Garduno, DO   cholecalciferol 3,000 Units Oral Daily Stevenson Garduno, DO   cloNIDine 0 2 mg Oral [facility] Katelyn Carty MD   doxazosin 2 mg Oral HS Genie Dukes MD   DULoxetine 60 mg Oral BID Stevenson Garduno, DO   ferrous sulfate 325 mg Oral Daily With Breakfast Stevenson Garduno, DO   folic acid 1,709 mcg Oral Daily Stevenson Garduno, DO   hydrALAZINE 5 mg Intravenous Q6H PRN Kerry Yañez MD   hydrALAZINE 50 mg Oral Q8H [address] Genie Dukes MD   insulin lispro 1-5 Units Subcutaneous HS Stevenson Garduno, DO   insulin lispro 1-6 Units Subcutaneous TID AC Kerry Yañez MD   levothyroxine 125 mcg Oral Early Morning Stevenson Garduno, DO   LORazepam 2 mg Oral TID PRN Stevenson Garduno, DO   metoprolol tartrate 50 mg Oral Q12H [address] Stevenson Garduno, DO   ondansetron 4 mg Intravenous Q4H PRN Anahi Henderson MD   polyethylene glycol 34 g Oral Daily Ed Reed MD   pravastatin 80 mg Oral Daily Stevenson Garduno, DO   predniSONE 5 mg Oral Daily Stevenson Garduno, DO   rOPINIRole 0 25 mg Oral BID Stevenson Garduno, DO   saccharomyces boulardii 250 mg Oral BID Stevenson Garduno, DO   sertraline 200 mg Oral Daily Stevenson Garduno, DO   sevelamer 1,600 mg Oral TID With Meals Tiffanie Hannis, CRNP   sodium bicarbonate 1,300 mg Oral BID after meals Anum Benson MD spironolactone 25 mg Oral Daily Ayah Messina MD   tacrolimus 2 mg Oral Daily Janie Cavazos DO   tacrolimus 2 mg Oral HS Randall Pepe MD   torsemide 20 mg Oral Daily Tamy Guzmán DO        Today, Patient Was Seen By: Adair Stafford PA-C    ** Please Note: Dragon 360 Dictation voice to text software may have been used in the creation of this document   **

## 2019-12-31 NOTE — RESTORATIVE TECHNICIAN NOTE
Restorative Specialist Mobility Note       Activity: Ambulate in barron, Ambulate in room, Bathroom privileges, Chair, Dangle, Stand at bedside(Educated/encouraged pt to ambulate with assistance 3-4 x's/day  Chair alarm on   Pt callbell, phone/tray within reach )     Assistive Device: Front wheel walker          Stephanie STONE, Restorative Technician, [employer]

## 2019-12-31 NOTE — ASSESSMENT & PLAN NOTE
· ProBNP 20,640  2+ edema in lower extremity on admission  · Venous Doppler negative for DVT  · 2D echo - normal EF,G1DD,no RWMA, moderate aortic regurgitation, trace pericardial effusion  · Diuretics per Nephrology, received course of IV Lasix with albumin  Has been switched to PO torsemide  Continue Aldactone  · Continue metoprolol  · Daily weight and I&Os

## 2019-12-31 NOTE — SOCIAL WORK
Pt reviewed with [facility]  Pt is medically stable  [facility], [facility] following pending bed availability  CM updated these facilities

## 2019-12-31 NOTE — ASSESSMENT & PLAN NOTE
· Baseline creatinine 2 5-3  · Creatinine today: 2 54  · Nephrology following and appreciate their input  · Diuretics managed by Nephrology  · Now on p o  Bicarb and off bicarb infusion  · Spironolactone added due to hypokalemia  Consider discontinuing spironolactone if creatinine continues to trend up    · Avoid nephrotoxins as able, renally dose medications as indicated

## 2020-01-01 ENCOUNTER — DOCUMENTATION (OUTPATIENT)
Dept: PSYCHIATRY | Facility: CLINIC | Age: 55
End: 2020-01-01

## 2020-01-01 ENCOUNTER — TELEPHONE (OUTPATIENT)
Dept: HEMATOLOGY ONCOLOGY | Facility: CLINIC | Age: 55
End: 2020-01-01

## 2020-01-01 ENCOUNTER — TELEMEDICINE (OUTPATIENT)
Dept: ENDOCRINOLOGY | Facility: CLINIC | Age: 55
End: 2020-01-01
Payer: MEDICARE

## 2020-01-01 ENCOUNTER — TELEPHONE (OUTPATIENT)
Dept: ADMINISTRATIVE | Facility: HOSPITAL | Age: 55
End: 2020-01-01

## 2020-01-01 ENCOUNTER — APPOINTMENT (INPATIENT)
Dept: PERIOP | Facility: HOSPITAL | Age: 55
DRG: 689 | End: 2020-01-01
Payer: MEDICARE

## 2020-01-01 ENCOUNTER — APPOINTMENT (INPATIENT)
Dept: DIALYSIS | Facility: HOSPITAL | Age: 55
DRG: 689 | End: 2020-01-01
Attending: INTERNAL MEDICINE
Payer: MEDICARE

## 2020-01-01 ENCOUNTER — TELEPHONE (OUTPATIENT)
Dept: ENDOCRINOLOGY | Facility: CLINIC | Age: 55
End: 2020-01-01

## 2020-01-01 ENCOUNTER — DOCUMENTATION (OUTPATIENT)
Dept: HEMATOLOGY ONCOLOGY | Facility: CLINIC | Age: 55
End: 2020-01-01

## 2020-01-01 ENCOUNTER — TELEPHONE (OUTPATIENT)
Dept: NEPHROLOGY | Facility: CLINIC | Age: 55
End: 2020-01-01

## 2020-01-01 ENCOUNTER — APPOINTMENT (OUTPATIENT)
Dept: RADIOLOGY | Facility: HOSPITAL | Age: 55
End: 2020-01-01
Attending: SURGERY
Payer: MEDICARE

## 2020-01-01 ENCOUNTER — HOSPITAL ENCOUNTER (OUTPATIENT)
Facility: HOSPITAL | Age: 55
Setting detail: OUTPATIENT SURGERY
Discharge: HOME | End: 2020-07-13
Attending: SURGERY | Admitting: SURGERY
Payer: MEDICARE

## 2020-01-01 ENCOUNTER — APPOINTMENT (OUTPATIENT)
Dept: LAB | Facility: CLINIC | Age: 55
End: 2020-01-01
Payer: MEDICARE

## 2020-01-01 ENCOUNTER — TELEPHONE (OUTPATIENT)
Dept: HEMATOLOGY ONCOLOGY | Facility: MEDICAL CENTER | Age: 55
End: 2020-01-01

## 2020-01-01 ENCOUNTER — TELEPHONE (OUTPATIENT)
Dept: OTHER | Facility: HOSPITAL | Age: 55
End: 2020-01-01

## 2020-01-01 ENCOUNTER — TELEPHONE (OUTPATIENT)
Dept: INFUSION CENTER | Facility: CLINIC | Age: 55
End: 2020-01-01

## 2020-01-01 ENCOUNTER — APPOINTMENT (OUTPATIENT)
Dept: PHYSICAL THERAPY | Facility: CLINIC | Age: 55
End: 2020-01-01
Payer: MEDICARE

## 2020-01-01 ENCOUNTER — TELEPHONE (OUTPATIENT)
Dept: VASCULAR SURGERY | Facility: CLINIC | Age: 55
End: 2020-01-01

## 2020-01-01 ENCOUNTER — PREP FOR PROCEDURE (OUTPATIENT)
Dept: INTERVENTIONAL RADIOLOGY/VASCULAR | Facility: CLINIC | Age: 55
End: 2020-01-01

## 2020-01-01 ENCOUNTER — APPOINTMENT (INPATIENT)
Dept: DIALYSIS | Facility: HOSPITAL | Age: 55
DRG: 689 | End: 2020-01-01
Payer: MEDICARE

## 2020-01-01 ENCOUNTER — TELEPHONE (OUTPATIENT)
Dept: GASTROENTEROLOGY | Facility: MEDICAL CENTER | Age: 55
End: 2020-01-01

## 2020-01-01 ENCOUNTER — TRANSITIONAL CARE MANAGEMENT (OUTPATIENT)
Dept: FAMILY MEDICINE CLINIC | Facility: CLINIC | Age: 55
End: 2020-01-01

## 2020-01-01 ENCOUNTER — HOSPITAL ENCOUNTER (OUTPATIENT)
Dept: INFUSION CENTER | Facility: CLINIC | Age: 55
Discharge: HOME | End: 2020-03-25
Payer: MEDICARE

## 2020-01-01 ENCOUNTER — TELEPHONE (OUTPATIENT)
Dept: OTHER | Facility: OTHER | Age: 55
End: 2020-01-01

## 2020-01-01 ENCOUNTER — HOSPITAL ENCOUNTER (OUTPATIENT)
Dept: INFUSION CENTER | Facility: CLINIC | Age: 55
Discharge: HOME | End: 2020-05-08
Payer: MEDICARE

## 2020-01-01 ENCOUNTER — HOSPITAL ENCOUNTER (OUTPATIENT)
Dept: RADIOLOGY | Facility: HOSPITAL | Age: 55
Discharge: HOME | End: 2020-05-05
Attending: INTERNAL MEDICINE | Admitting: RADIOLOGY
Payer: MEDICARE

## 2020-01-01 ENCOUNTER — APPOINTMENT (EMERGENCY)
Dept: CT IMAGING | Facility: HOSPITAL | Age: 55
DRG: 391 | End: 2020-01-01
Payer: MEDICARE

## 2020-01-01 ENCOUNTER — OFFICE VISIT (OUTPATIENT)
Dept: HEMATOLOGY ONCOLOGY | Facility: CLINIC | Age: 55
End: 2020-01-01
Payer: MEDICARE

## 2020-01-01 ENCOUNTER — TRANSCRIBE ORDERS (OUTPATIENT)
Dept: RADIOLOGY | Facility: HOSPITAL | Age: 55
End: 2020-01-01

## 2020-01-01 ENCOUNTER — LAB (OUTPATIENT)
Dept: LAB | Age: 55
End: 2020-01-01
Payer: MEDICARE

## 2020-01-01 ENCOUNTER — TELEMEDICINE (OUTPATIENT)
Dept: FAMILY MEDICINE CLINIC | Facility: CLINIC | Age: 55
End: 2020-01-01
Payer: MEDICARE

## 2020-01-01 ENCOUNTER — ANESTHESIA EVENT (INPATIENT)
Dept: PERIOP | Facility: HOSPITAL | Age: 55
DRG: 689 | End: 2020-01-01
Payer: MEDICARE

## 2020-01-01 ENCOUNTER — APPOINTMENT (INPATIENT)
Dept: DIALYSIS | Facility: HOSPITAL | Age: 55
DRG: 391 | End: 2020-01-01
Payer: MEDICARE

## 2020-01-01 ENCOUNTER — TELEPHONE (OUTPATIENT)
Dept: GASTROENTEROLOGY | Facility: CLINIC | Age: 55
End: 2020-01-01

## 2020-01-01 ENCOUNTER — HOSPITAL ENCOUNTER (INPATIENT)
Facility: HOSPITAL | Age: 55
LOS: 8 days | Discharge: HOME HEALTH | DRG: 689 | End: 2020-09-24
Attending: EMERGENCY MEDICINE | Admitting: INTERNAL MEDICINE
Payer: MEDICARE

## 2020-01-01 ENCOUNTER — TRANSCRIBE ORDERS (OUTPATIENT)
Dept: ADMINISTRATIVE | Age: 55
End: 2020-01-01

## 2020-01-01 ENCOUNTER — OFFICE VISIT (OUTPATIENT)
Dept: VASCULAR SURGERY | Facility: CLINIC | Age: 55
End: 2020-01-01

## 2020-01-01 ENCOUNTER — APPOINTMENT (INPATIENT)
Dept: RADIOLOGY | Facility: HOSPITAL | Age: 55
DRG: 689 | End: 2020-01-01
Payer: MEDICARE

## 2020-01-01 ENCOUNTER — OFFICE VISIT (OUTPATIENT)
Dept: NEPHROLOGY | Facility: CLINIC | Age: 55
End: 2020-01-01
Payer: MEDICARE

## 2020-01-01 ENCOUNTER — APPOINTMENT (OUTPATIENT)
Dept: LAB | Age: 55
End: 2020-01-01
Payer: MEDICARE

## 2020-01-01 ENCOUNTER — HOSPITAL ENCOUNTER (OUTPATIENT)
Dept: NON INVASIVE DIAGNOSTICS | Facility: CLINIC | Age: 55
Discharge: HOME | End: 2020-03-17
Payer: MEDICARE

## 2020-01-01 ENCOUNTER — APPOINTMENT (EMERGENCY)
Dept: RADIOLOGY | Facility: HOSPITAL | Age: 55
DRG: 391 | End: 2020-01-01
Payer: MEDICARE

## 2020-01-01 ENCOUNTER — TELEPHONE (OUTPATIENT)
Dept: FAMILY MEDICINE CLINIC | Facility: CLINIC | Age: 55
End: 2020-01-01

## 2020-01-01 ENCOUNTER — TRANSCRIBE ORDERS (OUTPATIENT)
Dept: LAB | Facility: CLINIC | Age: 55
End: 2020-01-01

## 2020-01-01 ENCOUNTER — HOSPITAL ENCOUNTER (OUTPATIENT)
Dept: NON INVASIVE DIAGNOSTICS | Facility: CLINIC | Age: 55
Discharge: HOME | End: 2020-08-31
Payer: MEDICARE

## 2020-01-01 ENCOUNTER — HOSPITAL ENCOUNTER (OUTPATIENT)
Dept: RADIOLOGY | Facility: HOSPITAL | Age: 55
Discharge: HOME | End: 2020-05-05
Attending: INTERNAL MEDICINE
Payer: MEDICARE

## 2020-01-01 ENCOUNTER — APPOINTMENT (EMERGENCY)
Dept: RADIOLOGY | Facility: HOSPITAL | Age: 55
DRG: 689 | End: 2020-01-01
Payer: MEDICARE

## 2020-01-01 ENCOUNTER — TELEMEDICINE (OUTPATIENT)
Dept: VASCULAR SURGERY | Facility: CLINIC | Age: 55
End: 2020-01-01
Payer: MEDICARE

## 2020-01-01 ENCOUNTER — CONSULT (OUTPATIENT)
Dept: CARDIOLOGY CLINIC | Facility: CLINIC | Age: 55
End: 2020-01-01
Payer: MEDICARE

## 2020-01-01 ENCOUNTER — LAB (OUTPATIENT)
Dept: LAB | Facility: HOSPITAL | Age: 55
End: 2020-01-01
Attending: SURGERY
Payer: MEDICARE

## 2020-01-01 ENCOUNTER — HOSPITAL ENCOUNTER (OUTPATIENT)
Dept: INFUSION CENTER | Facility: HOSPITAL | Age: 55
Discharge: HOME | End: 2020-04-08
Attending: INTERNAL MEDICINE
Payer: MEDICARE

## 2020-01-01 ENCOUNTER — APPOINTMENT (INPATIENT)
Dept: RADIOLOGY | Facility: HOSPITAL | Age: 55
DRG: 391 | End: 2020-01-01
Payer: MEDICARE

## 2020-01-01 ENCOUNTER — OFFICE VISIT (OUTPATIENT)
Dept: VASCULAR SURGERY | Facility: CLINIC | Age: 55
End: 2020-01-01
Payer: MEDICARE

## 2020-01-01 ENCOUNTER — TELEMEDICINE (OUTPATIENT)
Dept: NEPHROLOGY | Facility: CLINIC | Age: 55
End: 2020-01-01
Payer: MEDICARE

## 2020-01-01 ENCOUNTER — TELEPHONE (OUTPATIENT)
Dept: PSYCHIATRY | Facility: CLINIC | Age: 55
End: 2020-01-01

## 2020-01-01 ENCOUNTER — HOSPITAL ENCOUNTER (INPATIENT)
Facility: HOSPITAL | Age: 55
LOS: 3 days | Discharge: HOME | DRG: 391 | End: 2020-05-17
Attending: EMERGENCY MEDICINE | Admitting: INTERNAL MEDICINE
Payer: MEDICARE

## 2020-01-01 ENCOUNTER — HOSPITAL ENCOUNTER (OUTPATIENT)
Dept: INFUSION CENTER | Facility: CLINIC | Age: 55
Discharge: HOME | End: 2020-04-21
Payer: MEDICARE

## 2020-01-01 ENCOUNTER — OFFICE VISIT (OUTPATIENT)
Dept: PHYSICAL THERAPY | Facility: CLINIC | Age: 55
End: 2020-01-01
Payer: MEDICARE

## 2020-01-01 ENCOUNTER — HOSPITAL ENCOUNTER (OUTPATIENT)
Dept: INFUSION CENTER | Facility: CLINIC | Age: 55
Discharge: HOME | End: 2020-04-22

## 2020-01-01 ENCOUNTER — PREP FOR PROCEDURE (OUTPATIENT)
Dept: VASCULAR SURGERY | Facility: CLINIC | Age: 55
End: 2020-01-01

## 2020-01-01 ENCOUNTER — OFFICE VISIT (OUTPATIENT)
Dept: FAMILY MEDICINE CLINIC | Facility: CLINIC | Age: 55
End: 2020-01-01
Payer: MEDICARE

## 2020-01-01 ENCOUNTER — TELEMEDICINE (OUTPATIENT)
Dept: HEMATOLOGY ONCOLOGY | Facility: CLINIC | Age: 55
End: 2020-01-01
Payer: MEDICARE

## 2020-01-01 ENCOUNTER — TELEPHONE (OUTPATIENT)
Dept: SURGERY | Facility: HOSPITAL | Age: 55
End: 2020-01-01

## 2020-01-01 ENCOUNTER — TELEMEDICINE (OUTPATIENT)
Dept: PSYCHIATRY | Facility: CLINIC | Age: 55
End: 2020-01-01
Payer: MEDICARE

## 2020-01-01 ENCOUNTER — HOSPITAL ENCOUNTER (OUTPATIENT)
Dept: INFUSION CENTER | Facility: CLINIC | Age: 55
Discharge: HOME | End: 2020-04-01

## 2020-01-01 ENCOUNTER — HOSPITAL ENCOUNTER (OUTPATIENT)
Dept: NON INVASIVE DIAGNOSTICS | Facility: CLINIC | Age: 55
Discharge: HOME | End: 2020-03-30
Payer: MEDICARE

## 2020-01-01 ENCOUNTER — HOSPITAL ENCOUNTER (OUTPATIENT)
Dept: INFUSION CENTER | Facility: CLINIC | Age: 55
Discharge: HOME | End: 2020-03-31
Payer: MEDICARE

## 2020-01-01 ENCOUNTER — LAB (OUTPATIENT)
Dept: LAB | Facility: CLINIC | Age: 55
End: 2020-01-01
Payer: MEDICARE

## 2020-01-01 ENCOUNTER — HOSPITAL ENCOUNTER (OUTPATIENT)
Dept: INFUSION CENTER | Facility: HOSPITAL | Age: 55
Discharge: HOME | End: 2020-03-07
Payer: MEDICARE

## 2020-01-01 ENCOUNTER — ANESTHESIA (INPATIENT)
Dept: PERIOP | Facility: HOSPITAL | Age: 55
DRG: 689 | End: 2020-01-01
Payer: MEDICARE

## 2020-01-01 ENCOUNTER — HOSPITAL ENCOUNTER (OUTPATIENT)
Dept: RADIOLOGY | Facility: HOSPITAL | Age: 55
Discharge: HOME | End: 2020-11-13
Attending: STUDENT IN AN ORGANIZED HEALTH CARE EDUCATION/TRAINING PROGRAM | Admitting: RADIOLOGY
Payer: MEDICARE

## 2020-01-01 ENCOUNTER — TELEPHONE (OUTPATIENT)
Dept: RADIOLOGY | Facility: HOSPITAL | Age: 55
End: 2020-01-01

## 2020-01-01 ENCOUNTER — HOSPITAL ENCOUNTER (OUTPATIENT)
Dept: INFUSION CENTER | Facility: HOSPITAL | Age: 55
Discharge: HOME | End: 2020-03-06
Payer: MEDICARE

## 2020-01-01 ENCOUNTER — DOCUMENTATION (OUTPATIENT)
Dept: NEPHROLOGY | Facility: CLINIC | Age: 55
End: 2020-01-01

## 2020-01-01 ENCOUNTER — ANESTHESIA EVENT (OUTPATIENT)
Dept: PERIOP | Facility: HOSPITAL | Age: 55
End: 2020-01-01
Payer: MEDICARE

## 2020-01-01 ENCOUNTER — ANESTHESIA (OUTPATIENT)
Dept: PERIOP | Facility: HOSPITAL | Age: 55
End: 2020-01-01
Payer: MEDICARE

## 2020-01-01 VITALS
HEART RATE: 64 BPM | TEMPERATURE: 98.7 F | RESPIRATION RATE: 20 BRPM | BODY MASS INDEX: 29.82 KG/M2 | HEIGHT: 67 IN | WEIGHT: 190 LBS | DIASTOLIC BLOOD PRESSURE: 54 MMHG | SYSTOLIC BLOOD PRESSURE: 110 MMHG | OXYGEN SATURATION: 98 %

## 2020-01-01 VITALS
WEIGHT: 196 LBS | DIASTOLIC BLOOD PRESSURE: 48 MMHG | HEART RATE: 62 BPM | OXYGEN SATURATION: 94 % | RESPIRATION RATE: 16 BRPM | BODY MASS INDEX: 29.7 KG/M2 | TEMPERATURE: 96.1 F | HEIGHT: 68 IN | SYSTOLIC BLOOD PRESSURE: 114 MMHG

## 2020-01-01 VITALS
RESPIRATION RATE: 18 BRPM | OXYGEN SATURATION: 98 % | DIASTOLIC BLOOD PRESSURE: 70 MMHG | TEMPERATURE: 97.8 F | HEART RATE: 82 BPM | SYSTOLIC BLOOD PRESSURE: 160 MMHG

## 2020-01-01 VITALS
TEMPERATURE: 97.4 F | SYSTOLIC BLOOD PRESSURE: 100 MMHG | RESPIRATION RATE: 18 BRPM | HEART RATE: 66 BPM | HEIGHT: 68 IN | WEIGHT: 188 LBS | BODY MASS INDEX: 28.49 KG/M2 | DIASTOLIC BLOOD PRESSURE: 52 MMHG

## 2020-01-01 VITALS
OXYGEN SATURATION: 93 % | BODY MASS INDEX: 30.41 KG/M2 | WEIGHT: 210 LBS | HEIGHT: 67 IN | RESPIRATION RATE: 20 BRPM | SYSTOLIC BLOOD PRESSURE: 188 MMHG | RESPIRATION RATE: 18 BRPM | TEMPERATURE: 97.8 F | WEIGHT: 200 LBS | BODY MASS INDEX: 32.96 KG/M2 | HEART RATE: 90 BPM | DIASTOLIC BLOOD PRESSURE: 58 MMHG | SYSTOLIC BLOOD PRESSURE: 131 MMHG | DIASTOLIC BLOOD PRESSURE: 75 MMHG | HEART RATE: 63 BPM

## 2020-01-01 VITALS
HEART RATE: 102 BPM | SYSTOLIC BLOOD PRESSURE: 180 MMHG | RESPIRATION RATE: 18 BRPM | WEIGHT: 206 LBS | BODY MASS INDEX: 31.22 KG/M2 | DIASTOLIC BLOOD PRESSURE: 180 MMHG | HEIGHT: 68 IN | TEMPERATURE: 97.6 F

## 2020-01-01 VITALS
HEART RATE: 87 BPM | OXYGEN SATURATION: 100 % | DIASTOLIC BLOOD PRESSURE: 51 MMHG | HEIGHT: 67 IN | WEIGHT: 201.94 LBS | SYSTOLIC BLOOD PRESSURE: 125 MMHG | RESPIRATION RATE: 18 BRPM | TEMPERATURE: 97.9 F | BODY MASS INDEX: 31.7 KG/M2

## 2020-01-01 VITALS
WEIGHT: 209 LBS | RESPIRATION RATE: 16 BRPM | HEIGHT: 67 IN | HEART RATE: 68 BPM | BODY MASS INDEX: 32.8 KG/M2 | SYSTOLIC BLOOD PRESSURE: 130 MMHG | DIASTOLIC BLOOD PRESSURE: 70 MMHG | OXYGEN SATURATION: 95 % | TEMPERATURE: 97 F

## 2020-01-01 VITALS
HEART RATE: 69 BPM | OXYGEN SATURATION: 98 % | DIASTOLIC BLOOD PRESSURE: 73 MMHG | TEMPERATURE: 98.1 F | RESPIRATION RATE: 18 BRPM | SYSTOLIC BLOOD PRESSURE: 161 MMHG

## 2020-01-01 VITALS
HEIGHT: 67 IN | SYSTOLIC BLOOD PRESSURE: 122 MMHG | WEIGHT: 202 LBS | DIASTOLIC BLOOD PRESSURE: 40 MMHG | BODY MASS INDEX: 31.71 KG/M2 | RESPIRATION RATE: 16 BRPM | HEART RATE: 81 BPM | OXYGEN SATURATION: 97 % | TEMPERATURE: 96.9 F

## 2020-01-01 VITALS
DIASTOLIC BLOOD PRESSURE: 42 MMHG | TEMPERATURE: 98.7 F | HEIGHT: 67 IN | WEIGHT: 197 LBS | BODY MASS INDEX: 30.92 KG/M2 | HEART RATE: 56 BPM | SYSTOLIC BLOOD PRESSURE: 108 MMHG

## 2020-01-01 VITALS
DIASTOLIC BLOOD PRESSURE: 60 MMHG | OXYGEN SATURATION: 97 % | TEMPERATURE: 97.8 F | BODY MASS INDEX: 30.01 KG/M2 | HEIGHT: 68 IN | RESPIRATION RATE: 20 BRPM | DIASTOLIC BLOOD PRESSURE: 68 MMHG | WEIGHT: 198 LBS | HEIGHT: 68 IN | RESPIRATION RATE: 18 BRPM | TEMPERATURE: 98.4 F | SYSTOLIC BLOOD PRESSURE: 132 MMHG | WEIGHT: 210 LBS | OXYGEN SATURATION: 96 % | HEART RATE: 59 BPM | HEART RATE: 105 BPM | SYSTOLIC BLOOD PRESSURE: 144 MMHG | BODY MASS INDEX: 31.83 KG/M2

## 2020-01-01 VITALS
BODY MASS INDEX: 29.43 KG/M2 | DIASTOLIC BLOOD PRESSURE: 53 MMHG | SYSTOLIC BLOOD PRESSURE: 150 MMHG | HEIGHT: 68 IN | WEIGHT: 194.2 LBS

## 2020-01-01 VITALS
HEART RATE: 104 BPM | DIASTOLIC BLOOD PRESSURE: 58 MMHG | TEMPERATURE: 98.6 F | SYSTOLIC BLOOD PRESSURE: 152 MMHG | RESPIRATION RATE: 18 BRPM

## 2020-01-01 VITALS
SYSTOLIC BLOOD PRESSURE: 170 MMHG | HEART RATE: 70 BPM | TEMPERATURE: 97 F | DIASTOLIC BLOOD PRESSURE: 72 MMHG | RESPIRATION RATE: 18 BRPM

## 2020-01-01 VITALS
WEIGHT: 196.7 LBS | DIASTOLIC BLOOD PRESSURE: 40 MMHG | SYSTOLIC BLOOD PRESSURE: 130 MMHG | HEART RATE: 54 BPM | HEIGHT: 68 IN | BODY MASS INDEX: 29.81 KG/M2 | TEMPERATURE: 98.4 F

## 2020-01-01 VITALS
HEART RATE: 77 BPM | SYSTOLIC BLOOD PRESSURE: 164 MMHG | RESPIRATION RATE: 18 BRPM | DIASTOLIC BLOOD PRESSURE: 69 MMHG | TEMPERATURE: 98.4 F

## 2020-01-01 VITALS
TEMPERATURE: 98.6 F | BODY MASS INDEX: 29.6 KG/M2 | OXYGEN SATURATION: 95 % | RESPIRATION RATE: 18 BRPM | WEIGHT: 194.67 LBS | DIASTOLIC BLOOD PRESSURE: 65 MMHG | SYSTOLIC BLOOD PRESSURE: 140 MMHG | HEART RATE: 71 BPM

## 2020-01-01 VITALS — HEIGHT: 68 IN | WEIGHT: 200 LBS | BODY MASS INDEX: 30.31 KG/M2

## 2020-01-01 VITALS — HEART RATE: 89 BPM

## 2020-01-01 VITALS
BODY MASS INDEX: 30.31 KG/M2 | SYSTOLIC BLOOD PRESSURE: 154 MMHG | HEIGHT: 68 IN | DIASTOLIC BLOOD PRESSURE: 52 MMHG | WEIGHT: 200 LBS

## 2020-01-01 VITALS — TEMPERATURE: 98.6 F | SYSTOLIC BLOOD PRESSURE: 162 MMHG | DIASTOLIC BLOOD PRESSURE: 50 MMHG

## 2020-01-01 VITALS
DIASTOLIC BLOOD PRESSURE: 64 MMHG | HEART RATE: 64 BPM | SYSTOLIC BLOOD PRESSURE: 158 MMHG | TEMPERATURE: 97.5 F | RESPIRATION RATE: 18 BRPM

## 2020-01-01 DIAGNOSIS — K26.9 DUODENAL ULCER: ICD-10-CM

## 2020-01-01 DIAGNOSIS — I10 ESSENTIAL HYPERTENSION: ICD-10-CM

## 2020-01-01 DIAGNOSIS — F41.1 GAD (GENERALIZED ANXIETY DISORDER): Chronic | ICD-10-CM

## 2020-01-01 DIAGNOSIS — C90.00 MULTIPLE MYELOMA NOT HAVING ACHIEVED REMISSION (HCC): ICD-10-CM

## 2020-01-01 DIAGNOSIS — I73.9 PERIPHERAL VASCULAR DISEASE (HCC): ICD-10-CM

## 2020-01-01 DIAGNOSIS — I15.0 RENOVASCULAR HYPERTENSION: ICD-10-CM

## 2020-01-01 DIAGNOSIS — C90.00 MULTIPLE MYELOMA NOT HAVING ACHIEVED REMISSION (HCC): Primary | ICD-10-CM

## 2020-01-01 DIAGNOSIS — E10.42 TYPE 1 DIABETES MELLITUS WITH DIABETIC POLYNEUROPATHY (HCC): ICD-10-CM

## 2020-01-01 DIAGNOSIS — Z94.0 RENAL TRANSPLANT RECIPIENT: Primary | ICD-10-CM

## 2020-01-01 DIAGNOSIS — N18.4 BENIGN HYPERTENSION WITH CKD (CHRONIC KIDNEY DISEASE) STAGE IV (HCC): ICD-10-CM

## 2020-01-01 DIAGNOSIS — Z94.0 STATUS POST KIDNEY TRANSPLANT: ICD-10-CM

## 2020-01-01 DIAGNOSIS — R41.0 CONFUSION: ICD-10-CM

## 2020-01-01 DIAGNOSIS — F41.1 GAD (GENERALIZED ANXIETY DISORDER): Primary | Chronic | ICD-10-CM

## 2020-01-01 DIAGNOSIS — K52.9 COLITIS: ICD-10-CM

## 2020-01-01 DIAGNOSIS — I10 BENIGN ESSENTIAL HTN: ICD-10-CM

## 2020-01-01 DIAGNOSIS — N18.6 ESRD (END STAGE RENAL DISEASE) (HCC): ICD-10-CM

## 2020-01-01 DIAGNOSIS — N18.30 ANEMIA DUE TO STAGE 3 CHRONIC KIDNEY DISEASE (HCC): ICD-10-CM

## 2020-01-01 DIAGNOSIS — D64.9 ANEMIA, UNSPECIFIED TYPE: Primary | ICD-10-CM

## 2020-01-01 DIAGNOSIS — I35.1 NONRHEUMATIC AORTIC VALVE INSUFFICIENCY: ICD-10-CM

## 2020-01-01 DIAGNOSIS — E87.5 HYPERKALEMIA: ICD-10-CM

## 2020-01-01 DIAGNOSIS — E85.9 MYELOMA ASSOCIATED AMYLOIDOSIS (HCC): Primary | ICD-10-CM

## 2020-01-01 DIAGNOSIS — N39.0 URINARY TRACT INFECTION WITHOUT HEMATURIA, SITE UNSPECIFIED: Primary | ICD-10-CM

## 2020-01-01 DIAGNOSIS — R10.9 ABDOMINAL PAIN, UNSPECIFIED ABDOMINAL LOCATION: ICD-10-CM

## 2020-01-01 DIAGNOSIS — N39.0 UTI (URINARY TRACT INFECTION): Primary | ICD-10-CM

## 2020-01-01 DIAGNOSIS — I50.32 CHRONIC DIASTOLIC CONGESTIVE HEART FAILURE (HCC): ICD-10-CM

## 2020-01-01 DIAGNOSIS — R10.9 ABDOMINAL PAIN: ICD-10-CM

## 2020-01-01 DIAGNOSIS — E03.9 ACQUIRED HYPOTHYROIDISM: ICD-10-CM

## 2020-01-01 DIAGNOSIS — N18.5 CKD (CHRONIC KIDNEY DISEASE), STAGE V (HCC): ICD-10-CM

## 2020-01-01 DIAGNOSIS — E83.41 HYPERMAGNESEMIA: ICD-10-CM

## 2020-01-01 DIAGNOSIS — R19.7 DIARRHEA, UNSPECIFIED TYPE: Primary | ICD-10-CM

## 2020-01-01 DIAGNOSIS — N17.9 ACUTE RENAL FAILURE SUPERIMPOSED ON STAGE 4 CHRONIC KIDNEY DISEASE, UNSPECIFIED ACUTE RENAL FAILURE TYPE (HCC): ICD-10-CM

## 2020-01-01 DIAGNOSIS — N39.0 RECURRENT UTI: ICD-10-CM

## 2020-01-01 DIAGNOSIS — E10.65 TYPE 1 DIABETES MELLITUS WITH HYPERGLYCEMIA (HCC): Primary | ICD-10-CM

## 2020-01-01 DIAGNOSIS — R33.9 URINARY RETENTION: ICD-10-CM

## 2020-01-01 DIAGNOSIS — Z94.0 RENAL TRANSPLANT RECIPIENT: ICD-10-CM

## 2020-01-01 DIAGNOSIS — E10.40 CONTROLLED TYPE 1 DIABETES MELLITUS WITH DIABETIC NEUROPATHY (HCC): ICD-10-CM

## 2020-01-01 DIAGNOSIS — D64.9 ANEMIA, UNSPECIFIED TYPE: ICD-10-CM

## 2020-01-01 DIAGNOSIS — R60.9 EDEMA, UNSPECIFIED TYPE: ICD-10-CM

## 2020-01-01 DIAGNOSIS — Z98.890 S/P ARTERIOVENOUS (AV) FISTULA CREATION: ICD-10-CM

## 2020-01-01 DIAGNOSIS — N18.4 BENIGN HYPERTENSION WITH CKD (CHRONIC KIDNEY DISEASE) STAGE IV (HCC): Primary | ICD-10-CM

## 2020-01-01 DIAGNOSIS — N39.0 UTI (URINARY TRACT INFECTION): ICD-10-CM

## 2020-01-01 DIAGNOSIS — D63.1 ANEMIA IN STAGE 3 CHRONIC KIDNEY DISEASE (HCC): Primary | ICD-10-CM

## 2020-01-01 DIAGNOSIS — N18.4 ACUTE RENAL FAILURE SUPERIMPOSED ON STAGE 4 CHRONIC KIDNEY DISEASE, UNSPECIFIED ACUTE RENAL FAILURE TYPE (HCC): ICD-10-CM

## 2020-01-01 DIAGNOSIS — E78.5 HYPERLIPIDEMIA, UNSPECIFIED HYPERLIPIDEMIA TYPE: ICD-10-CM

## 2020-01-01 DIAGNOSIS — E78.2 MIXED HYPERLIPIDEMIA: ICD-10-CM

## 2020-01-01 DIAGNOSIS — N18.6 CKD (CHRONIC KIDNEY DISEASE) REQUIRING CHRONIC DIALYSIS (HCC): ICD-10-CM

## 2020-01-01 DIAGNOSIS — E10.65 TYPE 1 DIABETES MELLITUS WITH HYPERGLYCEMIA (HCC): ICD-10-CM

## 2020-01-01 DIAGNOSIS — G25.81 RLS (RESTLESS LEGS SYNDROME): ICD-10-CM

## 2020-01-01 DIAGNOSIS — N18.5 CKD (CHRONIC KIDNEY DISEASE), STAGE V (HCC): Primary | ICD-10-CM

## 2020-01-01 DIAGNOSIS — I50.33 ACUTE ON CHRONIC DIASTOLIC CONGESTIVE HEART FAILURE (HCC): ICD-10-CM

## 2020-01-01 DIAGNOSIS — D63.1 ANEMIA DUE TO STAGE 3 CHRONIC KIDNEY DISEASE (HCC): ICD-10-CM

## 2020-01-01 DIAGNOSIS — F33.42 MAJOR DEPRESSIVE DISORDER, RECURRENT EPISODE, IN FULL REMISSION (HCC): Primary | Chronic | ICD-10-CM

## 2020-01-01 DIAGNOSIS — I12.9 BENIGN HYPERTENSION WITH CKD (CHRONIC KIDNEY DISEASE) STAGE IV (HCC): ICD-10-CM

## 2020-01-01 DIAGNOSIS — N18.4 HYPERTENSIVE KIDNEY DISEASE WITH CHRONIC KIDNEY DISEASE STAGE IV (HCC): Primary | ICD-10-CM

## 2020-01-01 DIAGNOSIS — I70.90 ATHEROSCLEROTIC OCCLUSIVE DISEASE: ICD-10-CM

## 2020-01-01 DIAGNOSIS — R10.84 GENERALIZED ABDOMINAL PAIN: ICD-10-CM

## 2020-01-01 DIAGNOSIS — K59.09 CHRONIC CONSTIPATION: Primary | ICD-10-CM

## 2020-01-01 DIAGNOSIS — N30.00 ACUTE CYSTITIS WITHOUT HEMATURIA: ICD-10-CM

## 2020-01-01 DIAGNOSIS — Z94.0 STATUS POST KIDNEY TRANSPLANT: Primary | ICD-10-CM

## 2020-01-01 DIAGNOSIS — Z12.31 SCREENING MAMMOGRAM FOR HIGH-RISK PATIENT: ICD-10-CM

## 2020-01-01 DIAGNOSIS — F43.12 POST-TRAUMATIC STRESS DISORDER, CHRONIC: Chronic | ICD-10-CM

## 2020-01-01 DIAGNOSIS — R53.81 PHYSICAL DECONDITIONING: ICD-10-CM

## 2020-01-01 DIAGNOSIS — T86.891 FAILURE OF PANCREAS TRANSPLANT: ICD-10-CM

## 2020-01-01 DIAGNOSIS — N25.81 SECONDARY HYPERPARATHYROIDISM (HCC): ICD-10-CM

## 2020-01-01 DIAGNOSIS — I12.9 BENIGN HYPERTENSION WITH CKD (CHRONIC KIDNEY DISEASE) STAGE IV (HCC): Primary | ICD-10-CM

## 2020-01-01 DIAGNOSIS — A41.9 SEPSIS (HCC): ICD-10-CM

## 2020-01-01 DIAGNOSIS — N18.6 ESRD (END STAGE RENAL DISEASE) (HCC): Primary | ICD-10-CM

## 2020-01-01 DIAGNOSIS — Z11.59 SCREENING FOR VIRAL DISEASE: Primary | ICD-10-CM

## 2020-01-01 DIAGNOSIS — I12.9 HYPERTENSIVE KIDNEY DISEASE WITH CHRONIC KIDNEY DISEASE STAGE IV (HCC): Primary | ICD-10-CM

## 2020-01-01 DIAGNOSIS — I77.1 SUBCLAVIAN ARTERY STENOSIS, LEFT (HCC): ICD-10-CM

## 2020-01-01 DIAGNOSIS — R19.7 DIARRHEA, UNSPECIFIED TYPE: ICD-10-CM

## 2020-01-01 DIAGNOSIS — N18.30 ANEMIA IN STAGE 3 CHRONIC KIDNEY DISEASE (HCC): Primary | ICD-10-CM

## 2020-01-01 DIAGNOSIS — N18.9 CHRONIC KIDNEY DISEASE, UNSPECIFIED CKD STAGE: ICD-10-CM

## 2020-01-01 DIAGNOSIS — F33.42 MAJOR DEPRESSIVE DISORDER, RECURRENT EPISODE, IN FULL REMISSION (HCC): Chronic | ICD-10-CM

## 2020-01-01 DIAGNOSIS — Z23 NEED FOR INFLUENZA VACCINATION: ICD-10-CM

## 2020-01-01 DIAGNOSIS — E78.00 PURE HYPERCHOLESTEROLEMIA: Primary | ICD-10-CM

## 2020-01-01 DIAGNOSIS — Z01.818 OTHER SPECIFIED PRE-OPERATIVE EXAMINATION: Primary | ICD-10-CM

## 2020-01-01 DIAGNOSIS — I48.91 ATRIAL FIBRILLATION, UNSPECIFIED TYPE (HCC): ICD-10-CM

## 2020-01-01 DIAGNOSIS — R53.1 GENERALIZED WEAKNESS: ICD-10-CM

## 2020-01-01 DIAGNOSIS — Z11.59 SCREENING FOR VIRAL DISEASE: ICD-10-CM

## 2020-01-01 DIAGNOSIS — T45.1X5A CHEMOTHERAPY INDUCED NAUSEA AND VOMITING: ICD-10-CM

## 2020-01-01 DIAGNOSIS — R11.2 INTRACTABLE NAUSEA AND VOMITING: ICD-10-CM

## 2020-01-01 DIAGNOSIS — N19 RENAL FAILURE: ICD-10-CM

## 2020-01-01 DIAGNOSIS — R63.30 FEEDING DIFFICULTIES: ICD-10-CM

## 2020-01-01 DIAGNOSIS — D63.1 ANEMIA IN STAGE 3 CHRONIC KIDNEY DISEASE (HCC): ICD-10-CM

## 2020-01-01 DIAGNOSIS — I48.0 PAROXYSMAL ATRIAL FIBRILLATION (HCC): ICD-10-CM

## 2020-01-01 DIAGNOSIS — K31.9: ICD-10-CM

## 2020-01-01 DIAGNOSIS — D84.9 IMMUNOSUPPRESSION (HCC): ICD-10-CM

## 2020-01-01 DIAGNOSIS — R53.1 GENERALIZED WEAKNESS: Primary | ICD-10-CM

## 2020-01-01 DIAGNOSIS — N18.4 CHRONIC KIDNEY DISEASE, STAGE 4 (SEVERE) (HCC): Primary | ICD-10-CM

## 2020-01-01 DIAGNOSIS — N39.0 RECURRENT UTI: Primary | ICD-10-CM

## 2020-01-01 DIAGNOSIS — Z74.09 DECREASED MOBILITY AND ENDURANCE: Primary | ICD-10-CM

## 2020-01-01 DIAGNOSIS — I12.9 HYPERTENSIVE KIDNEY DISEASE WITH CHRONIC KIDNEY DISEASE STAGE IV (HCC): ICD-10-CM

## 2020-01-01 DIAGNOSIS — E10.40 CONTROLLED TYPE 1 DIABETES MELLITUS WITH DIABETIC NEUROPATHY (HCC): Primary | ICD-10-CM

## 2020-01-01 DIAGNOSIS — F33.0 MAJOR DEPRESSIVE DISORDER, RECURRENT EPISODE, MILD (HCC): Primary | Chronic | ICD-10-CM

## 2020-01-01 DIAGNOSIS — R11.0 NAUSEA: ICD-10-CM

## 2020-01-01 DIAGNOSIS — N18.30 ANEMIA IN STAGE 3 CHRONIC KIDNEY DISEASE (HCC): ICD-10-CM

## 2020-01-01 DIAGNOSIS — C90.00 MYELOMA ASSOCIATED AMYLOIDOSIS (HCC): Primary | ICD-10-CM

## 2020-01-01 DIAGNOSIS — K52.9 COLITIS: Primary | ICD-10-CM

## 2020-01-01 DIAGNOSIS — R11.2 CHEMOTHERAPY INDUCED NAUSEA AND VOMITING: ICD-10-CM

## 2020-01-01 DIAGNOSIS — N18.4 HYPERTENSIVE KIDNEY DISEASE WITH CHRONIC KIDNEY DISEASE STAGE IV (HCC): ICD-10-CM

## 2020-01-01 DIAGNOSIS — E10.8 TYPE 1 DIABETES MELLITUS WITH COMPLICATION (HCC): Primary | ICD-10-CM

## 2020-01-01 DIAGNOSIS — E10.8 TYPE 1 DIABETES MELLITUS WITH COMPLICATION (HCC): ICD-10-CM

## 2020-01-01 DIAGNOSIS — K59.09 CHRONIC CONSTIPATION: ICD-10-CM

## 2020-01-01 DIAGNOSIS — N18.4 CHRONIC KIDNEY DISEASE, STAGE 4 (SEVERE) (HCC): ICD-10-CM

## 2020-01-01 DIAGNOSIS — Z99.2 CKD (CHRONIC KIDNEY DISEASE) REQUIRING CHRONIC DIALYSIS (HCC): ICD-10-CM

## 2020-01-01 LAB
25(OH)D3 SERPL-MCNC: 63.1 NG/ML (ref 30–100)
ABO GROUP BLD BPU: NORMAL
ABO GROUP BLD: NORMAL
ALBUMIN SERPL BCP-MCNC: 2.7 G/DL (ref 3.5–5)
ALBUMIN SERPL BCP-MCNC: 3 G/DL (ref 3.5–5)
ALBUMIN SERPL BCP-MCNC: 3 G/DL (ref 3.5–5)
ALBUMIN SERPL BCP-MCNC: 3.1 G/DL (ref 3.5–5)
ALBUMIN SERPL BCP-MCNC: 3.3 G/DL (ref 3.5–5)
ALBUMIN SERPL BCP-MCNC: 3.4 G/DL (ref 3.5–5)
ALBUMIN SERPL BCP-MCNC: 3.5 G/DL (ref 3.5–5)
ALBUMIN SERPL ELPH-MCNC: 3.53 G/DL (ref 3.5–5)
ALBUMIN SERPL ELPH-MCNC: 3.75 G/DL (ref 3.5–5)
ALBUMIN SERPL ELPH-MCNC: 3.77 G/DL (ref 3.5–5)
ALBUMIN SERPL ELPH-MCNC: 3.79 G/DL (ref 3.5–5)
ALBUMIN SERPL ELPH-MCNC: 3.8 G/DL (ref 3.5–5)
ALBUMIN SERPL ELPH-MCNC: 48.3 % (ref 52–65)
ALBUMIN SERPL ELPH-MCNC: 51.2 % (ref 52–65)
ALBUMIN SERPL ELPH-MCNC: 51.4 % (ref 52–65)
ALBUMIN SERPL ELPH-MCNC: 52.1 % (ref 52–65)
ALBUMIN SERPL ELPH-MCNC: 53.1 % (ref 52–65)
ALP SERPL-CCNC: 103 U/L (ref 46–116)
ALP SERPL-CCNC: 127 U/L (ref 46–116)
ALP SERPL-CCNC: 138 U/L (ref 46–116)
ALP SERPL-CCNC: 151 U/L (ref 46–116)
ALP SERPL-CCNC: 169 U/L (ref 46–116)
ALP SERPL-CCNC: 89 U/L (ref 46–116)
ALP SERPL-CCNC: 94 U/L (ref 46–116)
ALP SERPL-CCNC: 96 U/L (ref 46–116)
ALP SERPL-CCNC: 96 U/L (ref 46–116)
ALPHA1 GLOB SERPL ELPH-MCNC: 0.33 G/DL (ref 0.1–0.4)
ALPHA1 GLOB SERPL ELPH-MCNC: 0.35 G/DL (ref 0.1–0.4)
ALPHA1 GLOB SERPL ELPH-MCNC: 0.38 G/DL (ref 0.1–0.4)
ALPHA1 GLOB SERPL ELPH-MCNC: 0.38 G/DL (ref 0.1–0.4)
ALPHA1 GLOB SERPL ELPH-MCNC: 0.44 G/DL (ref 0.1–0.4)
ALPHA1 GLOB SERPL ELPH-MCNC: 4.4 % (ref 2.5–5)
ALPHA1 GLOB SERPL ELPH-MCNC: 4.9 % (ref 2.5–5)
ALPHA1 GLOB SERPL ELPH-MCNC: 5.1 % (ref 2.5–5)
ALPHA1 GLOB SERPL ELPH-MCNC: 5.3 % (ref 2.5–5)
ALPHA1 GLOB SERPL ELPH-MCNC: 6 % (ref 2.5–5)
ALPHA2 GLOB SERPL ELPH-MCNC: 0.58 G/DL (ref 0.4–1.2)
ALPHA2 GLOB SERPL ELPH-MCNC: 0.64 G/DL (ref 0.4–1.2)
ALPHA2 GLOB SERPL ELPH-MCNC: 0.72 G/DL (ref 0.4–1.2)
ALPHA2 GLOB SERPL ELPH-MCNC: 7.8 % (ref 7–13)
ALPHA2 GLOB SERPL ELPH-MCNC: 7.9 % (ref 7–13)
ALPHA2 GLOB SERPL ELPH-MCNC: 8.2 % (ref 7–13)
ALPHA2 GLOB SERPL ELPH-MCNC: 8.9 % (ref 7–13)
ALPHA2 GLOB SERPL ELPH-MCNC: 9.8 % (ref 7–13)
ALT SERPL W P-5'-P-CCNC: 12 U/L (ref 12–78)
ALT SERPL W P-5'-P-CCNC: 16 U/L (ref 12–78)
ALT SERPL W P-5'-P-CCNC: 18 U/L (ref 12–78)
ALT SERPL W P-5'-P-CCNC: 24 U/L (ref 12–78)
ALT SERPL W P-5'-P-CCNC: 31 U/L (ref 12–78)
ALT SERPL W P-5'-P-CCNC: 31 U/L (ref 12–78)
ALT SERPL W P-5'-P-CCNC: 35 U/L (ref 12–78)
ALT SERPL W P-5'-P-CCNC: 7 U/L (ref 12–78)
ALT SERPL W P-5'-P-CCNC: 8 U/L (ref 12–78)
AMYLASE SERPL-CCNC: 96 IU/L (ref 25–115)
ANION GAP SERPL CALCULATED.3IONS-SCNC: 10 MMOL/L (ref 4–13)
ANION GAP SERPL CALCULATED.3IONS-SCNC: 11 MMOL/L (ref 4–13)
ANION GAP SERPL CALCULATED.3IONS-SCNC: 11 MMOL/L (ref 4–13)
ANION GAP SERPL CALCULATED.3IONS-SCNC: 12 MMOL/L (ref 4–13)
ANION GAP SERPL CALCULATED.3IONS-SCNC: 12 MMOL/L (ref 4–13)
ANION GAP SERPL CALCULATED.3IONS-SCNC: 13 MMOL/L (ref 4–13)
ANION GAP SERPL CALCULATED.3IONS-SCNC: 17 MMOL/L (ref 4–13)
ANION GAP SERPL CALCULATED.3IONS-SCNC: 17 MMOL/L (ref 4–13)
ANION GAP SERPL CALCULATED.3IONS-SCNC: 4 MMOL/L (ref 4–13)
ANION GAP SERPL CALCULATED.3IONS-SCNC: 5 MMOL/L (ref 4–13)
ANION GAP SERPL CALCULATED.3IONS-SCNC: 6 MMOL/L (ref 4–13)
ANION GAP SERPL CALCULATED.3IONS-SCNC: 7 MMOL/L (ref 4–13)
ANION GAP SERPL CALCULATED.3IONS-SCNC: 7 MMOL/L (ref 4–13)
ANION GAP SERPL CALCULATED.3IONS-SCNC: 8 MMOL/L (ref 4–13)
ANTIBODY ID. #3: NORMAL
APTT PPP: 23 SECONDS (ref 23–37)
APTT PPP: 24 SECONDS (ref 23–37)
AST SERPL W P-5'-P-CCNC: 11 U/L (ref 5–45)
AST SERPL W P-5'-P-CCNC: 12 U/L (ref 5–45)
AST SERPL W P-5'-P-CCNC: 14 U/L (ref 5–45)
AST SERPL W P-5'-P-CCNC: 14 U/L (ref 5–45)
AST SERPL W P-5'-P-CCNC: 17 U/L (ref 5–45)
AST SERPL W P-5'-P-CCNC: 18 U/L (ref 5–45)
AST SERPL W P-5'-P-CCNC: 20 U/L (ref 5–45)
AST SERPL W P-5'-P-CCNC: 21 U/L (ref 5–45)
AST SERPL W P-5'-P-CCNC: 28 U/L (ref 5–45)
ATRIAL RATE: 63 BPM
ATRIAL RATE: 92 BPM
BACTERIA BLD CULT: NORMAL
BACTERIA UR CULT: ABNORMAL
BACTERIA UR QL AUTO: ABNORMAL /HPF
BASE EX.OXY STD BLDV CALC-SCNC: 96.2 % (ref 60–80)
BASE EXCESS BLDV CALC-SCNC: -1 MMOL/L
BASOPHILS # BLD AUTO: 0.03 THOUSANDS/ΜL (ref 0–0.1)
BASOPHILS # BLD AUTO: 0.05 THOUSANDS/ΜL (ref 0–0.1)
BASOPHILS # BLD AUTO: 0.05 THOUSANDS/ΜL (ref 0–0.1)
BASOPHILS # BLD AUTO: 0.08 THOUSANDS/ΜL (ref 0–0.1)
BASOPHILS # BLD AUTO: 0.11 THOUSANDS/ΜL (ref 0–0.1)
BASOPHILS # BLD AUTO: 0.13 THOUSANDS/ΜL (ref 0–0.1)
BASOPHILS # BLD AUTO: 0.16 THOUSANDS/ΜL (ref 0–0.1)
BASOPHILS # BLD AUTO: 0.17 THOUSANDS/ΜL (ref 0–0.1)
BASOPHILS # BLD AUTO: 0.19 THOUSANDS/ΜL (ref 0–0.1)
BASOPHILS # BLD AUTO: 0.22 THOUSANDS/ΜL (ref 0–0.1)
BASOPHILS NFR BLD AUTO: 1 % (ref 0–1)
BASOPHILS NFR BLD AUTO: 2 % (ref 0–1)
BASOPHILS NFR BLD AUTO: 3 % (ref 0–1)
BASOPHILS NFR BLD AUTO: 4 % (ref 0–1)
BASOPHILS NFR BLD AUTO: 5 % (ref 0–1)
BETA GLOB ABNORMAL SERPL ELPH-MCNC: 0.31 G/DL (ref 0.4–0.8)
BETA GLOB ABNORMAL SERPL ELPH-MCNC: 0.31 G/DL (ref 0.4–0.8)
BETA GLOB ABNORMAL SERPL ELPH-MCNC: 0.33 G/DL (ref 0.4–0.8)
BETA GLOB ABNORMAL SERPL ELPH-MCNC: 0.36 G/DL (ref 0.4–0.8)
BETA GLOB ABNORMAL SERPL ELPH-MCNC: 0.37 G/DL (ref 0.4–0.8)
BETA-HYDROXYBUTYRATE: 0.1 MMOL/L
BETA1 GLOB SERPL ELPH-MCNC: 4.3 % (ref 5–13)
BETA1 GLOB SERPL ELPH-MCNC: 4.4 % (ref 5–13)
BETA1 GLOB SERPL ELPH-MCNC: 4.5 % (ref 5–13)
BETA1 GLOB SERPL ELPH-MCNC: 4.8 % (ref 5–13)
BETA1 GLOB SERPL ELPH-MCNC: 5.1 % (ref 5–13)
BETA2 GLOB SERPL ELPH-MCNC: 10.4 % (ref 2–8)
BETA2 GLOB SERPL ELPH-MCNC: 7.2 % (ref 2–8)
BETA2 GLOB SERPL ELPH-MCNC: 7.4 % (ref 2–8)
BETA2 GLOB SERPL ELPH-MCNC: 9.2 % (ref 2–8)
BETA2 GLOB SERPL ELPH-MCNC: 9.5 % (ref 2–8)
BETA2+GAMMA GLOB SERPL ELPH-MCNC: 0.51 G/DL (ref 0.2–0.5)
BETA2+GAMMA GLOB SERPL ELPH-MCNC: 0.53 G/DL (ref 0.2–0.5)
BETA2+GAMMA GLOB SERPL ELPH-MCNC: 0.67 G/DL (ref 0.2–0.5)
BETA2+GAMMA GLOB SERPL ELPH-MCNC: 0.7 G/DL (ref 0.2–0.5)
BETA2+GAMMA GLOB SERPL ELPH-MCNC: 0.77 G/DL (ref 0.2–0.5)
BILIRUB SERPL-MCNC: 0.36 MG/DL (ref 0.2–1)
BILIRUB SERPL-MCNC: 0.56 MG/DL (ref 0.2–1)
BILIRUB SERPL-MCNC: 0.62 MG/DL (ref 0.2–1)
BILIRUB SERPL-MCNC: 0.66 MG/DL (ref 0.2–1)
BILIRUB SERPL-MCNC: 0.68 MG/DL (ref 0.2–1)
BILIRUB SERPL-MCNC: 0.69 MG/DL (ref 0.2–1)
BILIRUB SERPL-MCNC: 0.72 MG/DL (ref 0.2–1)
BILIRUB SERPL-MCNC: 0.75 MG/DL (ref 0.2–1)
BILIRUB SERPL-MCNC: 0.8 MG/DL (ref 0.2–1)
BILIRUB UR QL STRIP: NEGATIVE
BLD GP AB SCN SERPL QL: POSITIVE
BLOOD GROUP ANTIBODIES SERPL: NORMAL
BPU ID: NORMAL
BUN SERPL-MCNC: 116 MG/DL (ref 5–25)
BUN SERPL-MCNC: 123 MG/DL (ref 5–25)
BUN SERPL-MCNC: 23 MG/DL (ref 5–25)
BUN SERPL-MCNC: 26 MG/DL (ref 5–25)
BUN SERPL-MCNC: 29 MG/DL (ref 5–25)
BUN SERPL-MCNC: 29 MG/DL (ref 5–25)
BUN SERPL-MCNC: 35 MG/DL (ref 5–25)
BUN SERPL-MCNC: 38 MG/DL (ref 5–25)
BUN SERPL-MCNC: 38 MG/DL (ref 5–25)
BUN SERPL-MCNC: 39 MG/DL (ref 5–25)
BUN SERPL-MCNC: 43 MG/DL (ref 5–25)
BUN SERPL-MCNC: 45 MG/DL (ref 5–25)
BUN SERPL-MCNC: 50 MG/DL (ref 5–25)
BUN SERPL-MCNC: 51 MG/DL (ref 5–25)
BUN SERPL-MCNC: 53 MG/DL (ref 5–25)
BUN SERPL-MCNC: 53 MG/DL (ref 5–25)
BUN SERPL-MCNC: 58 MG/DL (ref 5–25)
BUN SERPL-MCNC: 58 MG/DL (ref 5–25)
BUN SERPL-MCNC: 61 MG/DL (ref 5–25)
BUN SERPL-MCNC: 62 MG/DL (ref 5–25)
BUN SERPL-MCNC: 65 MG/DL (ref 5–25)
BUN SERPL-MCNC: 92 MG/DL (ref 5–25)
C DIFF TOX B TCDB STL QL NAA+PROBE: NEGATIVE
C DIFF TOX B TCDB STL QL NAA+PROBE: NEGATIVE
C DIFF TOX GENS STL QL NAA+PROBE: NEGATIVE
CALCIUM ALBUM COR SERPL-MCNC: 8.8 MG/DL (ref 8.3–10.1)
CALCIUM ALBUM COR SERPL-MCNC: 9.2 MG/DL (ref 8.3–10.1)
CALCIUM ALBUM COR SERPL-MCNC: 9.3 MG/DL (ref 8.3–10.1)
CALCIUM SERPL-MCNC: 7.6 MG/DL (ref 8.3–10.1)
CALCIUM SERPL-MCNC: 7.7 MG/DL (ref 8.3–10.1)
CALCIUM SERPL-MCNC: 7.7 MG/DL (ref 8.3–10.1)
CALCIUM SERPL-MCNC: 7.8 MG/DL (ref 8.3–10.1)
CALCIUM SERPL-MCNC: 8 MG/DL (ref 8.3–10.1)
CALCIUM SERPL-MCNC: 8 MG/DL (ref 8.3–10.1)
CALCIUM SERPL-MCNC: 8.1 MG/DL (ref 8.3–10.1)
CALCIUM SERPL-MCNC: 8.2 MG/DL (ref 8.3–10.1)
CALCIUM SERPL-MCNC: 8.3 MG/DL (ref 8.3–10.1)
CALCIUM SERPL-MCNC: 8.4 MG/DL (ref 8.3–10.1)
CALCIUM SERPL-MCNC: 8.5 MG/DL (ref 8.3–10.1)
CALCIUM SERPL-MCNC: 8.5 MG/DL (ref 8.3–10.1)
CALCIUM SERPL-MCNC: 8.6 MG/DL (ref 8.3–10.1)
CALCIUM SERPL-MCNC: 8.7 MG/DL (ref 8.3–10.1)
CALCIUM SERPL-MCNC: 9.1 MG/DL (ref 8.3–10.1)
CALCIUM SERPL-MCNC: 9.4 MG/DL (ref 8.3–10.1)
CAMPYLOBACTER DNA SPEC NAA+PROBE: NORMAL
CHLORIDE SERPL-SCNC: 100 MMOL/L (ref 100–108)
CHLORIDE SERPL-SCNC: 101 MMOL/L (ref 100–108)
CHLORIDE SERPL-SCNC: 102 MMOL/L (ref 100–108)
CHLORIDE SERPL-SCNC: 103 MMOL/L (ref 100–108)
CHLORIDE SERPL-SCNC: 104 MMOL/L (ref 100–108)
CHLORIDE SERPL-SCNC: 105 MMOL/L (ref 100–108)
CHLORIDE SERPL-SCNC: 106 MMOL/L (ref 100–108)
CHLORIDE SERPL-SCNC: 107 MMOL/L (ref 100–108)
CHLORIDE SERPL-SCNC: 108 MMOL/L (ref 100–108)
CHLORIDE SERPL-SCNC: 109 MMOL/L (ref 100–108)
CHLORIDE SERPL-SCNC: 110 MMOL/L (ref 100–108)
CHLORIDE SERPL-SCNC: 116 MMOL/L (ref 100–108)
CHLORIDE SERPL-SCNC: 87 MMOL/L (ref 100–108)
CHLORIDE SERPL-SCNC: 98 MMOL/L (ref 100–108)
CHLORIDE SERPL-SCNC: 98 MMOL/L (ref 100–108)
CHLORIDE SERPL-SCNC: 99 MMOL/L (ref 100–108)
CLARITY UR: ABNORMAL
CO2 SERPL-SCNC: 16 MMOL/L (ref 21–32)
CO2 SERPL-SCNC: 17 MMOL/L (ref 21–32)
CO2 SERPL-SCNC: 18 MMOL/L (ref 21–32)
CO2 SERPL-SCNC: 18 MMOL/L (ref 21–32)
CO2 SERPL-SCNC: 20 MMOL/L (ref 21–32)
CO2 SERPL-SCNC: 22 MMOL/L (ref 21–32)
CO2 SERPL-SCNC: 23 MMOL/L (ref 21–32)
CO2 SERPL-SCNC: 24 MMOL/L (ref 21–32)
CO2 SERPL-SCNC: 25 MMOL/L (ref 21–32)
CO2 SERPL-SCNC: 26 MMOL/L (ref 21–32)
CO2 SERPL-SCNC: 27 MMOL/L (ref 21–32)
CO2 SERPL-SCNC: 29 MMOL/L (ref 21–32)
CO2 SERPL-SCNC: 29 MMOL/L (ref 21–32)
CO2 SERPL-SCNC: 32 MMOL/L (ref 21–32)
CO2 SERPL-SCNC: 7 MMOL/L (ref 21–32)
CO2 SERPL-SCNC: 8 MMOL/L (ref 21–32)
COLOR UR: ABNORMAL
COLOR UR: YELLOW
COLOR, POC: NORMAL
CREAT SERPL-MCNC: 12.6 MG/DL (ref 0.6–1.3)
CREAT SERPL-MCNC: 12.9 MG/DL (ref 0.6–1.3)
CREAT SERPL-MCNC: 2.57 MG/DL (ref 0.6–1.3)
CREAT SERPL-MCNC: 3 MG/DL (ref 0.6–1.3)
CREAT SERPL-MCNC: 3.23 MG/DL (ref 0.6–1.3)
CREAT SERPL-MCNC: 3.34 MG/DL (ref 0.6–1.3)
CREAT SERPL-MCNC: 3.81 MG/DL (ref 0.6–1.3)
CREAT SERPL-MCNC: 3.89 MG/DL (ref 0.6–1.3)
CREAT SERPL-MCNC: 3.98 MG/DL (ref 0.6–1.3)
CREAT SERPL-MCNC: 3.99 MG/DL (ref 0.6–1.3)
CREAT SERPL-MCNC: 4.22 MG/DL (ref 0.6–1.3)
CREAT SERPL-MCNC: 4.25 MG/DL (ref 0.6–1.3)
CREAT SERPL-MCNC: 4.28 MG/DL (ref 0.6–1.3)
CREAT SERPL-MCNC: 4.49 MG/DL (ref 0.6–1.3)
CREAT SERPL-MCNC: 4.68 MG/DL (ref 0.6–1.3)
CREAT SERPL-MCNC: 4.77 MG/DL (ref 0.6–1.3)
CREAT SERPL-MCNC: 5.6 MG/DL (ref 0.6–1.3)
CREAT SERPL-MCNC: 6.34 MG/DL (ref 0.6–1.3)
CREAT SERPL-MCNC: 6.35 MG/DL (ref 0.6–1.3)
CREAT SERPL-MCNC: 6.53 MG/DL (ref 0.6–1.3)
CREAT SERPL-MCNC: 7.35 MG/DL (ref 0.6–1.3)
CREAT SERPL-MCNC: 8.25 MG/DL (ref 0.6–1.3)
CREAT UR-MCNC: 30.1 MG/DL
CREAT UR-MCNC: 36.2 MG/DL
CREAT UR-MCNC: 39 MG/DL
CREAT UR-MCNC: 49 MG/DL
CROSSMATCH: NORMAL
DAT C3-SP REAG RBC QL: NEGATIVE
DAT IGG-SP REAG RBCCO QL: NORMAL
DAT POLY-SP REAG RBC QL: NORMAL
EOSINOPHIL # BLD AUTO: 0.06 THOUSAND/ΜL (ref 0–0.61)
EOSINOPHIL # BLD AUTO: 0.08 THOUSAND/ΜL (ref 0–0.61)
EOSINOPHIL # BLD AUTO: 0.13 THOUSAND/ΜL (ref 0–0.61)
EOSINOPHIL # BLD AUTO: 0.14 THOUSAND/ΜL (ref 0–0.61)
EOSINOPHIL # BLD AUTO: 0.37 THOUSAND/ΜL (ref 0–0.61)
EOSINOPHIL # BLD AUTO: 0.43 THOUSAND/ΜL (ref 0–0.61)
EOSINOPHIL # BLD AUTO: 0.5 THOUSAND/ΜL (ref 0–0.61)
EOSINOPHIL # BLD AUTO: 0.51 THOUSAND/ΜL (ref 0–0.61)
EOSINOPHIL # BLD AUTO: 0.52 THOUSAND/ΜL (ref 0–0.61)
EOSINOPHIL # BLD AUTO: 0.64 THOUSAND/ΜL (ref 0–0.61)
EOSINOPHIL # BLD AUTO: 0.65 THOUSAND/ΜL (ref 0–0.61)
EOSINOPHIL # BLD AUTO: 0.7 THOUSAND/ΜL (ref 0–0.61)
EOSINOPHIL # BLD AUTO: 1.11 THOUSAND/ΜL (ref 0–0.61)
EOSINOPHIL NFR BLD AUTO: 1 % (ref 0–6)
EOSINOPHIL NFR BLD AUTO: 1 % (ref 0–6)
EOSINOPHIL NFR BLD AUTO: 10 % (ref 0–6)
EOSINOPHIL NFR BLD AUTO: 13 % (ref 0–6)
EOSINOPHIL NFR BLD AUTO: 16 % (ref 0–6)
EOSINOPHIL NFR BLD AUTO: 19 % (ref 0–6)
EOSINOPHIL NFR BLD AUTO: 2 % (ref 0–6)
EOSINOPHIL NFR BLD AUTO: 2 % (ref 0–6)
EOSINOPHIL NFR BLD AUTO: 6 % (ref 0–6)
EOSINOPHIL NFR BLD AUTO: 7 % (ref 0–6)
EOSINOPHIL NFR BLD AUTO: 8 % (ref 0–6)
EOSINOPHIL NFR BLD AUTO: 8 % (ref 0–6)
EOSINOPHIL NFR BLD AUTO: 9 % (ref 0–6)
ERYTHROCYTE [DISTWIDTH] IN BLOOD BY AUTOMATED COUNT: 15.4 % (ref 11.6–15.1)
ERYTHROCYTE [DISTWIDTH] IN BLOOD BY AUTOMATED COUNT: 15.5 % (ref 11.6–15.1)
ERYTHROCYTE [DISTWIDTH] IN BLOOD BY AUTOMATED COUNT: 16 % (ref 11.6–15.1)
ERYTHROCYTE [DISTWIDTH] IN BLOOD BY AUTOMATED COUNT: 16.1 % (ref 11.6–15.1)
ERYTHROCYTE [DISTWIDTH] IN BLOOD BY AUTOMATED COUNT: 16.3 % (ref 11.6–15.1)
ERYTHROCYTE [DISTWIDTH] IN BLOOD BY AUTOMATED COUNT: 16.6 % (ref 11.6–15.1)
ERYTHROCYTE [DISTWIDTH] IN BLOOD BY AUTOMATED COUNT: 16.9 % (ref 11.6–15.1)
ERYTHROCYTE [DISTWIDTH] IN BLOOD BY AUTOMATED COUNT: 16.9 % (ref 11.6–15.1)
ERYTHROCYTE [DISTWIDTH] IN BLOOD BY AUTOMATED COUNT: 17.1 % (ref 11.6–15.1)
ERYTHROCYTE [DISTWIDTH] IN BLOOD BY AUTOMATED COUNT: 17.3 % (ref 11.6–15.1)
ERYTHROCYTE [DISTWIDTH] IN BLOOD BY AUTOMATED COUNT: 17.5 % (ref 11.6–15.1)
ERYTHROCYTE [DISTWIDTH] IN BLOOD BY AUTOMATED COUNT: 17.8 % (ref 11.6–15.1)
ERYTHROCYTE [DISTWIDTH] IN BLOOD BY AUTOMATED COUNT: 17.9 % (ref 11.6–15.1)
ERYTHROCYTE [DISTWIDTH] IN BLOOD BY AUTOMATED COUNT: 18.4 % (ref 11.6–15.1)
ERYTHROCYTE [DISTWIDTH] IN BLOOD BY AUTOMATED COUNT: 18.5 % (ref 11.6–15.1)
ERYTHROCYTE [DISTWIDTH] IN BLOOD BY AUTOMATED COUNT: 18.6 % (ref 11.6–15.1)
ERYTHROCYTE [DISTWIDTH] IN BLOOD BY AUTOMATED COUNT: 18.7 % (ref 11.6–15.1)
ERYTHROCYTE [DISTWIDTH] IN BLOOD BY AUTOMATED COUNT: 19.9 % (ref 11.6–15.1)
ERYTHROCYTE [DISTWIDTH] IN BLOOD BY AUTOMATED COUNT: 20.5 % (ref 11.6–15.1)
ERYTHROCYTE [DISTWIDTH] IN BLOOD BY AUTOMATED COUNT: 20.8 % (ref 11.6–15.1)
EST. AVERAGE GLUCOSE BLD GHB EST-MCNC: 103 MG/DL
EST. AVERAGE GLUCOSE BLD GHB EST-MCNC: 74 MG/DL
FINE GRAN CASTS URNS QL MICRO: ABNORMAL /LPF
G LAMBLIA AG STL QL IA: NEGATIVE
GAMMA GLOB ABNORMAL SERPL ELPH-MCNC: 1.55 G/DL (ref 0.5–1.6)
GAMMA GLOB ABNORMAL SERPL ELPH-MCNC: 1.57 G/DL (ref 0.5–1.6)
GAMMA GLOB ABNORMAL SERPL ELPH-MCNC: 1.58 G/DL (ref 0.5–1.6)
GAMMA GLOB ABNORMAL SERPL ELPH-MCNC: 1.61 G/DL (ref 0.5–1.6)
GAMMA GLOB ABNORMAL SERPL ELPH-MCNC: 1.61 G/DL (ref 0.5–1.6)
GAMMA GLOB SERPL ELPH-MCNC: 21.2 % (ref 12–22)
GAMMA GLOB SERPL ELPH-MCNC: 21.6 % (ref 12–22)
GAMMA GLOB SERPL ELPH-MCNC: 21.8 % (ref 12–22)
GAMMA GLOB SERPL ELPH-MCNC: 21.8 % (ref 12–22)
GAMMA GLOB SERPL ELPH-MCNC: 22.4 % (ref 12–22)
GASTRIN SERPL-MCNC: 162 PG/ML (ref 0–115)
GFR SERPL CREATININE-BSD FRML MDRD: 10 ML/MIN/1.73SQ M
GFR SERPL CREATININE-BSD FRML MDRD: 11 ML/MIN/1.73SQ M
GFR SERPL CREATININE-BSD FRML MDRD: 12 ML/MIN/1.73SQ M
GFR SERPL CREATININE-BSD FRML MDRD: 13 ML/MIN/1.73SQ M
GFR SERPL CREATININE-BSD FRML MDRD: 15 ML/MIN/1.73SQ M
GFR SERPL CREATININE-BSD FRML MDRD: 15 ML/MIN/1.73SQ M
GFR SERPL CREATININE-BSD FRML MDRD: 17 ML/MIN/1.73SQ M
GFR SERPL CREATININE-BSD FRML MDRD: 20 ML/MIN/1.73SQ M
GFR SERPL CREATININE-BSD FRML MDRD: 3 ML/MIN/1.73SQ M
GFR SERPL CREATININE-BSD FRML MDRD: 3 ML/MIN/1.73SQ M
GFR SERPL CREATININE-BSD FRML MDRD: 5 ML/MIN/1.73SQ M
GFR SERPL CREATININE-BSD FRML MDRD: 6 ML/MIN/1.73SQ M
GFR SERPL CREATININE-BSD FRML MDRD: 7 ML/MIN/1.73SQ M
GFR SERPL CREATININE-BSD FRML MDRD: 8 ML/MIN/1.73SQ M
GLUCOSE P FAST SERPL-MCNC: 71 MG/DL (ref 65–99)
GLUCOSE P FAST SERPL-MCNC: 79 MG/DL (ref 65–99)
GLUCOSE P FAST SERPL-MCNC: 81 MG/DL (ref 65–99)
GLUCOSE P FAST SERPL-MCNC: 92 MG/DL (ref 65–99)
GLUCOSE P FAST SERPL-MCNC: 95 MG/DL (ref 65–99)
GLUCOSE P FAST SERPL-MCNC: 97 MG/DL (ref 65–99)
GLUCOSE P FAST SERPL-MCNC: 98 MG/DL (ref 65–99)
GLUCOSE SERPL-MCNC: 100 MG/DL (ref 65–140)
GLUCOSE SERPL-MCNC: 100 MG/DL (ref 65–140)
GLUCOSE SERPL-MCNC: 101 MG/DL (ref 65–140)
GLUCOSE SERPL-MCNC: 102 MG/DL (ref 65–140)
GLUCOSE SERPL-MCNC: 103 MG/DL (ref 65–140)
GLUCOSE SERPL-MCNC: 104 MG/DL (ref 65–140)
GLUCOSE SERPL-MCNC: 105 MG/DL (ref 65–140)
GLUCOSE SERPL-MCNC: 106 MG/DL (ref 65–140)
GLUCOSE SERPL-MCNC: 109 MG/DL (ref 65–140)
GLUCOSE SERPL-MCNC: 111 MG/DL (ref 65–140)
GLUCOSE SERPL-MCNC: 111 MG/DL (ref 65–140)
GLUCOSE SERPL-MCNC: 115 MG/DL (ref 65–140)
GLUCOSE SERPL-MCNC: 116 MG/DL (ref 65–140)
GLUCOSE SERPL-MCNC: 117 MG/DL (ref 65–140)
GLUCOSE SERPL-MCNC: 119 MG/DL (ref 65–140)
GLUCOSE SERPL-MCNC: 127 MG/DL (ref 65–140)
GLUCOSE SERPL-MCNC: 131 MG/DL (ref 65–140)
GLUCOSE SERPL-MCNC: 132 MG/DL (ref 65–140)
GLUCOSE SERPL-MCNC: 136 MG/DL (ref 65–140)
GLUCOSE SERPL-MCNC: 145 MG/DL (ref 65–140)
GLUCOSE SERPL-MCNC: 162 MG/DL (ref 65–140)
GLUCOSE SERPL-MCNC: 186 MG/DL (ref 65–140)
GLUCOSE SERPL-MCNC: 67 MG/DL (ref 65–140)
GLUCOSE SERPL-MCNC: 68 MG/DL (ref 65–140)
GLUCOSE SERPL-MCNC: 78 MG/DL (ref 65–140)
GLUCOSE SERPL-MCNC: 80 MG/DL (ref 65–140)
GLUCOSE SERPL-MCNC: 80 MG/DL (ref 65–140)
GLUCOSE SERPL-MCNC: 82 MG/DL (ref 65–140)
GLUCOSE SERPL-MCNC: 83 MG/DL (ref 65–140)
GLUCOSE SERPL-MCNC: 83 MG/DL (ref 65–140)
GLUCOSE SERPL-MCNC: 84 MG/DL (ref 65–140)
GLUCOSE SERPL-MCNC: 86 MG/DL (ref 65–140)
GLUCOSE SERPL-MCNC: 86 MG/DL (ref 65–140)
GLUCOSE SERPL-MCNC: 87 MG/DL (ref 65–140)
GLUCOSE SERPL-MCNC: 88 MG/DL (ref 65–140)
GLUCOSE SERPL-MCNC: 88 MG/DL (ref 65–140)
GLUCOSE SERPL-MCNC: 89 MG/DL (ref 65–140)
GLUCOSE SERPL-MCNC: 90 MG/DL (ref 65–140)
GLUCOSE SERPL-MCNC: 91 MG/DL (ref 65–140)
GLUCOSE SERPL-MCNC: 92 MG/DL (ref 65–140)
GLUCOSE SERPL-MCNC: 93 MG/DL (ref 65–140)
GLUCOSE SERPL-MCNC: 94 MG/DL (ref 65–140)
GLUCOSE SERPL-MCNC: 95 MG/DL (ref 65–140)
GLUCOSE SERPL-MCNC: 96 MG/DL (ref 65–140)
GLUCOSE SERPL-MCNC: 97 MG/DL (ref 65–140)
GLUCOSE SERPL-MCNC: 97 MG/DL (ref 65–140)
GLUCOSE SERPL-MCNC: 98 MG/DL (ref 65–140)
GLUCOSE SERPL-MCNC: 98 MG/DL (ref 65–140)
GLUCOSE SERPL-MCNC: 99 MG/DL (ref 65–140)
GLUCOSE UR STRIP-MCNC: NEGATIVE MG/DL
HBA1C MFR BLD: 4.2 % (ref 4.8–5.6)
HBA1C MFR BLD: 5.2 %
HBV CORE AB SER QL: NORMAL
HBV CORE IGM SER QL: NORMAL
HBV SURFACE AB SER-ACNC: <3.1 MIU/ML
HBV SURFACE AG SER QL: NORMAL
HCO3 BLDV-SCNC: 23.1 MMOL/L (ref 24–30)
HCT VFR BLD AUTO: 21.2 % (ref 34.8–46.1)
HCT VFR BLD AUTO: 21.8 % (ref 34.8–46.1)
HCT VFR BLD AUTO: 21.8 % (ref 34.8–46.1)
HCT VFR BLD AUTO: 22.1 % (ref 34.8–46.1)
HCT VFR BLD AUTO: 22.2 % (ref 34.8–46.1)
HCT VFR BLD AUTO: 23.1 % (ref 34.8–46.1)
HCT VFR BLD AUTO: 23.3 % (ref 34.8–46.1)
HCT VFR BLD AUTO: 23.7 % (ref 34.8–46.1)
HCT VFR BLD AUTO: 24.1 % (ref 34.8–46.1)
HCT VFR BLD AUTO: 24.6 % (ref 34.8–46.1)
HCT VFR BLD AUTO: 24.9 % (ref 34.8–46.1)
HCT VFR BLD AUTO: 25.3 % (ref 34.8–46.1)
HCT VFR BLD AUTO: 25.6 % (ref 34.8–46.1)
HCT VFR BLD AUTO: 27.1 % (ref 34.8–46.1)
HCT VFR BLD AUTO: 27.7 % (ref 34.8–46.1)
HCT VFR BLD AUTO: 27.7 % (ref 34.8–46.1)
HCT VFR BLD AUTO: 27.8 % (ref 34.8–46.1)
HCT VFR BLD AUTO: 29.4 % (ref 34.8–46.1)
HCT VFR BLD AUTO: 29.9 % (ref 34.8–46.1)
HCT VFR BLD AUTO: 31 % (ref 34.8–46.1)
HCV AB SER QL: NORMAL
HGB BLD-MCNC: 6.6 G/DL (ref 11.5–15.4)
HGB BLD-MCNC: 6.7 G/DL (ref 11.5–15.4)
HGB BLD-MCNC: 6.9 G/DL (ref 11.5–15.4)
HGB BLD-MCNC: 7 G/DL (ref 11.5–15.4)
HGB BLD-MCNC: 7.2 G/DL (ref 11.5–15.4)
HGB BLD-MCNC: 7.4 G/DL (ref 11.5–15.4)
HGB BLD-MCNC: 7.5 G/DL (ref 11.5–15.4)
HGB BLD-MCNC: 7.6 G/DL (ref 11.5–15.4)
HGB BLD-MCNC: 7.6 G/DL (ref 11.5–15.4)
HGB BLD-MCNC: 7.8 G/DL (ref 11.5–15.4)
HGB BLD-MCNC: 8.1 G/DL (ref 11.5–15.4)
HGB BLD-MCNC: 8.5 G/DL (ref 11.5–15.4)
HGB BLD-MCNC: 8.6 G/DL (ref 11.5–15.4)
HGB BLD-MCNC: 8.7 G/DL (ref 11.5–15.4)
HGB BLD-MCNC: 8.9 G/DL (ref 11.5–15.4)
HGB BLD-MCNC: 9 G/DL (ref 11.5–15.4)
HGB BLD-MCNC: 9 G/DL (ref 11.5–15.4)
HGB BLD-MCNC: 9.6 G/DL (ref 11.5–15.4)
HGB UR QL STRIP.AUTO: ABNORMAL
HGB UR QL STRIP.AUTO: NEGATIVE
HYALINE CASTS #/AREA URNS LPF: ABNORMAL /LPF
HYALINE CASTS #/AREA URNS LPF: ABNORMAL /LPF
IGA SERPL-MCNC: 110 MG/DL (ref 70–400)
IGA SERPL-MCNC: 120 MG/DL (ref 70–400)
IGA SERPL-MCNC: 127 MG/DL (ref 70–400)
IGA SERPL-MCNC: 133 MG/DL (ref 70–400)
IGG SERPL-MCNC: 1670 MG/DL (ref 700–1600)
IGG SERPL-MCNC: 1770 MG/DL (ref 700–1600)
IGG SERPL-MCNC: 1780 MG/DL (ref 700–1600)
IGG SERPL-MCNC: 1930 MG/DL (ref 700–1600)
IGG/ALB SER: 0.93 {RATIO} (ref 1.1–1.8)
IGG/ALB SER: 1.05 {RATIO} (ref 1.1–1.8)
IGG/ALB SER: 1.06 {RATIO} (ref 1.1–1.8)
IGG/ALB SER: 1.09 {RATIO} (ref 1.1–1.8)
IGG/ALB SER: 1.13 {RATIO} (ref 1.1–1.8)
IGM SERPL-MCNC: 58 MG/DL (ref 40–230)
IGM SERPL-MCNC: 59 MG/DL (ref 40–230)
IGM SERPL-MCNC: 61 MG/DL (ref 40–230)
IGM SERPL-MCNC: 69 MG/DL (ref 40–230)
IMM GRANULOCYTES # BLD AUTO: 0.01 THOUSAND/UL (ref 0–0.2)
IMM GRANULOCYTES # BLD AUTO: 0.02 THOUSAND/UL (ref 0–0.2)
IMM GRANULOCYTES # BLD AUTO: 0.04 THOUSAND/UL (ref 0–0.2)
IMM GRANULOCYTES # BLD AUTO: 0.04 THOUSAND/UL (ref 0–0.2)
IMM GRANULOCYTES # BLD AUTO: 0.05 THOUSAND/UL (ref 0–0.2)
IMM GRANULOCYTES # BLD AUTO: 0.06 THOUSAND/UL (ref 0–0.2)
IMM GRANULOCYTES # BLD AUTO: 0.07 THOUSAND/UL (ref 0–0.2)
IMM GRANULOCYTES # BLD AUTO: 0.07 THOUSAND/UL (ref 0–0.2)
IMM GRANULOCYTES NFR BLD AUTO: 0 % (ref 0–2)
IMM GRANULOCYTES NFR BLD AUTO: 1 % (ref 0–2)
INPATIENT: NORMAL
INR PPP: 1.14 (ref 0.84–1.19)
INR PPP: 1.15 (ref 0.84–1.19)
INR PPP: 1.19 (ref 0.84–1.19)
INR PPP: 1.25 (ref 0.84–1.19)
INR PPP: 1.28 (ref 0.84–1.19)
KAPPA LC FREE SER-MCNC: 124.8 MG/L (ref 3.3–19.4)
KAPPA LC FREE SER-MCNC: 180.7 MG/L (ref 3.3–19.4)
KAPPA LC FREE SER-MCNC: 262.2 MG/L (ref 3.3–19.4)
KAPPA LC FREE SER-MCNC: 565.3 MG/L (ref 3.3–19.4)
KAPPA LC FREE/LAMBDA FREE SER: 1.57 {RATIO} (ref 0.26–1.65)
KAPPA LC FREE/LAMBDA FREE SER: 1.85 {RATIO} (ref 0.26–1.65)
KAPPA LC FREE/LAMBDA FREE SER: 2.03 {RATIO} (ref 0.26–1.65)
KAPPA LC FREE/LAMBDA FREE SER: 4.84 {RATIO} (ref 0.26–1.65)
KETONES UR STRIP-MCNC: NEGATIVE MG/DL
LACTATE SERPL-SCNC: 0.7 MMOL/L (ref 0.5–2)
LACTATE SERPL-SCNC: 1.5 MMOL/L (ref 0.5–2)
LACTATE SERPL-SCNC: 1.5 MMOL/L (ref 0.5–2)
LAMBDA LC FREE SERPL-MCNC: 115.1 MG/L (ref 5.7–26.3)
LAMBDA LC FREE SERPL-MCNC: 116.7 MG/L (ref 5.7–26.3)
LAMBDA LC FREE SERPL-MCNC: 129.3 MG/L (ref 5.7–26.3)
LAMBDA LC FREE SERPL-MCNC: 67.6 MG/L (ref 5.7–26.3)
LEUKOCYTE ESTERASE UR QL STRIP: ABNORMAL
LIPASE SERPL-CCNC: 237 U/L (ref 73–393)
LIPASE SERPL-CCNC: 34 U/L (ref 73–393)
LIPASE SERPL-CCNC: 607 U/L (ref 73–393)
LYMPHOCYTES # BLD AUTO: 0.75 THOUSANDS/ΜL (ref 0.6–4.47)
LYMPHOCYTES # BLD AUTO: 0.79 THOUSANDS/ΜL (ref 0.6–4.47)
LYMPHOCYTES # BLD AUTO: 0.79 THOUSANDS/ΜL (ref 0.6–4.47)
LYMPHOCYTES # BLD AUTO: 0.98 THOUSANDS/ΜL (ref 0.6–4.47)
LYMPHOCYTES # BLD AUTO: 0.99 THOUSANDS/ΜL (ref 0.6–4.47)
LYMPHOCYTES # BLD AUTO: 1.02 THOUSANDS/ΜL (ref 0.6–4.47)
LYMPHOCYTES # BLD AUTO: 1.02 THOUSANDS/ΜL (ref 0.6–4.47)
LYMPHOCYTES # BLD AUTO: 1.07 THOUSANDS/ΜL (ref 0.6–4.47)
LYMPHOCYTES # BLD AUTO: 1.08 THOUSANDS/ΜL (ref 0.6–4.47)
LYMPHOCYTES # BLD AUTO: 1.28 THOUSANDS/ΜL (ref 0.6–4.47)
LYMPHOCYTES # BLD AUTO: 1.46 THOUSANDS/ΜL (ref 0.6–4.47)
LYMPHOCYTES # BLD AUTO: 1.54 THOUSANDS/ΜL (ref 0.6–4.47)
LYMPHOCYTES # BLD AUTO: 1.65 THOUSANDS/ΜL (ref 0.6–4.47)
LYMPHOCYTES NFR BLD AUTO: 10 % (ref 14–44)
LYMPHOCYTES NFR BLD AUTO: 13 % (ref 14–44)
LYMPHOCYTES NFR BLD AUTO: 15 % (ref 14–44)
LYMPHOCYTES NFR BLD AUTO: 17 % (ref 14–44)
LYMPHOCYTES NFR BLD AUTO: 17 % (ref 14–44)
LYMPHOCYTES NFR BLD AUTO: 18 % (ref 14–44)
LYMPHOCYTES NFR BLD AUTO: 19 % (ref 14–44)
LYMPHOCYTES NFR BLD AUTO: 19 % (ref 14–44)
LYMPHOCYTES NFR BLD AUTO: 20 % (ref 14–44)
LYMPHOCYTES NFR BLD AUTO: 22 % (ref 14–44)
LYMPHOCYTES NFR BLD AUTO: 23 % (ref 14–44)
LYMPHOCYTES NFR BLD AUTO: 24 % (ref 14–44)
LYMPHOCYTES NFR BLD AUTO: 24 % (ref 14–44)
M PEAK ID 2: 13.1 %
M PEAK ID 2: 13.1 %
M PEAK ID 2: 14.7 %
M PEAK ID 2: 7.9 %
M PEAK ID 2: 9.8 %
M PROTEIN 1 MFR SERPL ELPH: 4 %
M PROTEIN 1 MFR SERPL ELPH: 4.1 %
M PROTEIN 1 MFR SERPL ELPH: 5.4 %
M PROTEIN 1 MFR SERPL ELPH: 5.6 %
M PROTEIN 1 MFR SERPL ELPH: 6.8 %
M PROTEIN 1 SERPL ELPH-MCNC: 0.28 G/DL
M PROTEIN 1 SERPL ELPH-MCNC: 0.3 G/DL
M PROTEIN 1 SERPL ELPH-MCNC: 0.4 G/DL
M PROTEIN 1 SERPL ELPH-MCNC: 0.41 G/DL
M PROTEIN 1 SERPL ELPH-MCNC: 0.5 G/DL
M PROTEIN 2 SERPL ELPH-MCNC: 0.57 G/DL
M PROTEIN 2 SERPL ELPH-MCNC: 0.7 G/DL
M PROTEIN 2 SERPL ELPH-MCNC: 0.97 G/DL
M PROTEIN 2 SERPL ELPH-MCNC: 0.97 G/DL
M PROTEIN 2 SERPL ELPH-MCNC: 1.07 G/DL
M PROTEIN 3 MFR SERPL ELPH: 2.4 %
M PROTEIN 3 SERPL ELPH-MCNC: 0.17 G/DL
MAGNESIUM SERPL-MCNC: 2.1 MG/DL (ref 1.6–2.6)
MAGNESIUM SERPL-MCNC: 2.4 MG/DL (ref 1.6–2.6)
MAGNESIUM SERPL-MCNC: 2.6 MG/DL (ref 1.6–2.6)
MAGNESIUM SERPL-MCNC: 2.6 MG/DL (ref 1.6–2.6)
MAGNESIUM SERPL-MCNC: 2.7 MG/DL (ref 1.6–2.6)
MAGNESIUM SERPL-MCNC: 4.1 MG/DL (ref 1.6–2.6)
MCH RBC QN AUTO: 31 PG (ref 26.8–34.3)
MCH RBC QN AUTO: 31.4 PG (ref 26.8–34.3)
MCH RBC QN AUTO: 31.4 PG (ref 26.8–34.3)
MCH RBC QN AUTO: 31.5 PG (ref 26.8–34.3)
MCH RBC QN AUTO: 31.7 PG (ref 26.8–34.3)
MCH RBC QN AUTO: 31.8 PG (ref 26.8–34.3)
MCH RBC QN AUTO: 32.2 PG (ref 26.8–34.3)
MCH RBC QN AUTO: 32.3 PG (ref 26.8–34.3)
MCH RBC QN AUTO: 32.5 PG (ref 26.8–34.3)
MCH RBC QN AUTO: 32.8 PG (ref 26.8–34.3)
MCH RBC QN AUTO: 32.8 PG (ref 26.8–34.3)
MCH RBC QN AUTO: 33.2 PG (ref 26.8–34.3)
MCH RBC QN AUTO: 35.2 PG (ref 26.8–34.3)
MCH RBC QN AUTO: 35.2 PG (ref 26.8–34.3)
MCH RBC QN AUTO: 35.5 PG (ref 26.8–34.3)
MCH RBC QN AUTO: 35.7 PG (ref 26.8–34.3)
MCH RBC QN AUTO: 35.7 PG (ref 26.8–34.3)
MCH RBC QN AUTO: 36.3 PG (ref 26.8–34.3)
MCHC RBC AUTO-ENTMCNC: 29.9 G/DL (ref 31.4–37.4)
MCHC RBC AUTO-ENTMCNC: 30.1 G/DL (ref 31.4–37.4)
MCHC RBC AUTO-ENTMCNC: 30.5 G/DL (ref 31.4–37.4)
MCHC RBC AUTO-ENTMCNC: 30.6 G/DL (ref 31.4–37.4)
MCHC RBC AUTO-ENTMCNC: 30.7 G/DL (ref 31.4–37.4)
MCHC RBC AUTO-ENTMCNC: 30.7 G/DL (ref 31.4–37.4)
MCHC RBC AUTO-ENTMCNC: 30.8 G/DL (ref 31.4–37.4)
MCHC RBC AUTO-ENTMCNC: 30.9 G/DL (ref 31.4–37.4)
MCHC RBC AUTO-ENTMCNC: 31 G/DL (ref 31.4–37.4)
MCHC RBC AUTO-ENTMCNC: 31 G/DL (ref 31.4–37.4)
MCHC RBC AUTO-ENTMCNC: 31.1 G/DL (ref 31.4–37.4)
MCHC RBC AUTO-ENTMCNC: 31.2 G/DL (ref 31.4–37.4)
MCHC RBC AUTO-ENTMCNC: 31.2 G/DL (ref 31.4–37.4)
MCHC RBC AUTO-ENTMCNC: 31.3 G/DL (ref 31.4–37.4)
MCHC RBC AUTO-ENTMCNC: 31.6 G/DL (ref 31.4–37.4)
MCHC RBC AUTO-ENTMCNC: 31.7 G/DL (ref 31.4–37.4)
MCHC RBC AUTO-ENTMCNC: 31.8 G/DL (ref 31.4–37.4)
MCHC RBC AUTO-ENTMCNC: 32.1 G/DL (ref 31.4–37.4)
MCHC RBC AUTO-ENTMCNC: 33.2 G/DL (ref 31.4–37.4)
MCHC RBC AUTO-ENTMCNC: 33.3 G/DL (ref 31.4–37.4)
MCV RBC AUTO: 100 FL (ref 82–98)
MCV RBC AUTO: 101 FL (ref 82–98)
MCV RBC AUTO: 102 FL (ref 82–98)
MCV RBC AUTO: 102 FL (ref 82–98)
MCV RBC AUTO: 103 FL (ref 82–98)
MCV RBC AUTO: 104 FL (ref 82–98)
MCV RBC AUTO: 106 FL (ref 82–98)
MCV RBC AUTO: 107 FL (ref 82–98)
MCV RBC AUTO: 107 FL (ref 82–98)
MCV RBC AUTO: 109 FL (ref 82–98)
MCV RBC AUTO: 110 FL (ref 82–98)
MCV RBC AUTO: 113 FL (ref 82–98)
MCV RBC AUTO: 113 FL (ref 82–98)
MCV RBC AUTO: 115 FL (ref 82–98)
METHADONE UR QL: NORMAL
MONOCYTES # BLD AUTO: 0.52 THOUSAND/ΜL (ref 0.17–1.22)
MONOCYTES # BLD AUTO: 0.53 THOUSAND/ΜL (ref 0.17–1.22)
MONOCYTES # BLD AUTO: 0.55 THOUSAND/ΜL (ref 0.17–1.22)
MONOCYTES # BLD AUTO: 0.56 THOUSAND/ΜL (ref 0.17–1.22)
MONOCYTES # BLD AUTO: 0.62 THOUSAND/ΜL (ref 0.17–1.22)
MONOCYTES # BLD AUTO: 0.65 THOUSAND/ΜL (ref 0.17–1.22)
MONOCYTES # BLD AUTO: 0.7 THOUSAND/ΜL (ref 0.17–1.22)
MONOCYTES # BLD AUTO: 0.71 THOUSAND/ΜL (ref 0.17–1.22)
MONOCYTES # BLD AUTO: 0.73 THOUSAND/ΜL (ref 0.17–1.22)
MONOCYTES # BLD AUTO: 0.84 THOUSAND/ΜL (ref 0.17–1.22)
MONOCYTES # BLD AUTO: 0.87 THOUSAND/ΜL (ref 0.17–1.22)
MONOCYTES # BLD AUTO: 0.88 THOUSAND/ΜL (ref 0.17–1.22)
MONOCYTES # BLD AUTO: 0.92 THOUSAND/ΜL (ref 0.17–1.22)
MONOCYTES NFR BLD AUTO: 10 % (ref 4–12)
MONOCYTES NFR BLD AUTO: 11 % (ref 4–12)
MONOCYTES NFR BLD AUTO: 12 % (ref 4–12)
MONOCYTES NFR BLD AUTO: 14 % (ref 4–12)
MONOCYTES NFR BLD AUTO: 15 % (ref 4–12)
MONOCYTES NFR BLD AUTO: 16 % (ref 4–12)
MONOCYTES NFR BLD AUTO: 9 % (ref 4–12)
MRSA NOSE QL CULT: NORMAL
NEUTROPHILS # BLD AUTO: 1.79 THOUSANDS/ΜL (ref 1.85–7.62)
NEUTROPHILS # BLD AUTO: 2.4 THOUSANDS/ΜL (ref 1.85–7.62)
NEUTROPHILS # BLD AUTO: 2.75 THOUSANDS/ΜL (ref 1.85–7.62)
NEUTROPHILS # BLD AUTO: 3.05 THOUSANDS/ΜL (ref 1.85–7.62)
NEUTROPHILS # BLD AUTO: 3.53 THOUSANDS/ΜL (ref 1.85–7.62)
NEUTROPHILS # BLD AUTO: 3.55 THOUSANDS/ΜL (ref 1.85–7.62)
NEUTROPHILS # BLD AUTO: 3.71 THOUSANDS/ΜL (ref 1.85–7.62)
NEUTROPHILS # BLD AUTO: 3.77 THOUSANDS/ΜL (ref 1.85–7.62)
NEUTROPHILS # BLD AUTO: 3.89 THOUSANDS/ΜL (ref 1.85–7.62)
NEUTROPHILS # BLD AUTO: 4.06 THOUSANDS/ΜL (ref 1.85–7.62)
NEUTROPHILS # BLD AUTO: 4.2 THOUSANDS/ΜL (ref 1.85–7.62)
NEUTROPHILS # BLD AUTO: 4.48 THOUSANDS/ΜL (ref 1.85–7.62)
NEUTROPHILS # BLD AUTO: 5.92 THOUSANDS/ΜL (ref 1.85–7.62)
NEUTS SEG NFR BLD AUTO: 42 % (ref 43–75)
NEUTS SEG NFR BLD AUTO: 44 % (ref 43–75)
NEUTS SEG NFR BLD AUTO: 53 % (ref 43–75)
NEUTS SEG NFR BLD AUTO: 54 % (ref 43–75)
NEUTS SEG NFR BLD AUTO: 55 % (ref 43–75)
NEUTS SEG NFR BLD AUTO: 57 % (ref 43–75)
NEUTS SEG NFR BLD AUTO: 58 % (ref 43–75)
NEUTS SEG NFR BLD AUTO: 63 % (ref 43–75)
NEUTS SEG NFR BLD AUTO: 65 % (ref 43–75)
NEUTS SEG NFR BLD AUTO: 65 % (ref 43–75)
NEUTS SEG NFR BLD AUTO: 66 % (ref 43–75)
NEUTS SEG NFR BLD AUTO: 68 % (ref 43–75)
NEUTS SEG NFR BLD AUTO: 75 % (ref 43–75)
NITRITE UR QL STRIP: NEGATIVE
NITRITE UR QL STRIP: POSITIVE
NITRITE UR QL STRIP: POSITIVE
NON-SQ EPI CELLS URNS QL MICRO: ABNORMAL /HPF
NRBC BLD AUTO-RTO: 0 /100 WBCS
O+P STL CONC: NORMAL
O2 CT BLDV-SCNC: 12.5 ML/DL
OTHER STN SPEC: ABNORMAL
OTHER STN SPEC: ABNORMAL
P AXIS: 60 DEGREES
P AXIS: 75 DEGREES
PCO2 BLDV: 36 MM HG (ref 42–50)
PH BLDV: 7.43 [PH] (ref 7.3–7.4)
PH UR STRIP.AUTO: 8 [PH]
PH UR STRIP.AUTO: 8.5 [PH]
PH UR STRIP.AUTO: 8.5 [PH]
PH UR STRIP.AUTO: 8.5 [PH] (ref 4.5–8)
PH UR STRIP.AUTO: >=9 [PH] (ref 4.5–8)
PHOSPHATE SERPL-MCNC: 3.6 MG/DL (ref 2.7–4.5)
PHOSPHATE SERPL-MCNC: 4.2 MG/DL (ref 2.7–4.5)
PHOSPHATE SERPL-MCNC: 4.4 MG/DL (ref 2.7–4.5)
PHOSPHATE SERPL-MCNC: 5.4 MG/DL (ref 2.7–4.5)
PHOSPHATE SERPL-MCNC: 5.4 MG/DL (ref 2.7–4.5)
PHOSPHATE SERPL-MCNC: 8.2 MG/DL (ref 2.7–4.5)
PLATELET # BLD AUTO: 136 THOUSANDS/UL (ref 149–390)
PLATELET # BLD AUTO: 138 THOUSANDS/UL (ref 149–390)
PLATELET # BLD AUTO: 146 THOUSANDS/UL (ref 149–390)
PLATELET # BLD AUTO: 146 THOUSANDS/UL (ref 149–390)
PLATELET # BLD AUTO: 149 THOUSANDS/UL (ref 149–390)
PLATELET # BLD AUTO: 151 THOUSANDS/UL (ref 149–390)
PLATELET # BLD AUTO: 155 THOUSANDS/UL (ref 149–390)
PLATELET # BLD AUTO: 160 THOUSANDS/UL (ref 149–390)
PLATELET # BLD AUTO: 160 THOUSANDS/UL (ref 149–390)
PLATELET # BLD AUTO: 163 THOUSANDS/UL (ref 149–390)
PLATELET # BLD AUTO: 167 THOUSANDS/UL (ref 149–390)
PLATELET # BLD AUTO: 172 THOUSANDS/UL (ref 149–390)
PLATELET # BLD AUTO: 172 THOUSANDS/UL (ref 149–390)
PLATELET # BLD AUTO: 175 THOUSANDS/UL (ref 149–390)
PLATELET # BLD AUTO: 183 THOUSANDS/UL (ref 149–390)
PLATELET # BLD AUTO: 184 THOUSANDS/UL (ref 149–390)
PLATELET # BLD AUTO: 184 THOUSANDS/UL (ref 149–390)
PLATELET # BLD AUTO: 190 THOUSANDS/UL (ref 149–390)
PLATELET # BLD AUTO: 200 THOUSANDS/UL (ref 149–390)
PLATELET # BLD AUTO: 203 THOUSANDS/UL (ref 149–390)
PLATELET # BLD AUTO: 218 THOUSANDS/UL (ref 149–390)
PLATELET # BLD AUTO: 240 THOUSANDS/UL (ref 149–390)
PMV BLD AUTO: 10.3 FL (ref 8.9–12.7)
PMV BLD AUTO: 10.7 FL (ref 8.9–12.7)
PMV BLD AUTO: 10.7 FL (ref 8.9–12.7)
PMV BLD AUTO: 10.9 FL (ref 8.9–12.7)
PMV BLD AUTO: 10.9 FL (ref 8.9–12.7)
PMV BLD AUTO: 11 FL (ref 8.9–12.7)
PMV BLD AUTO: 11 FL (ref 8.9–12.7)
PMV BLD AUTO: 11.2 FL (ref 8.9–12.7)
PMV BLD AUTO: 11.3 FL (ref 8.9–12.7)
PMV BLD AUTO: 11.6 FL (ref 8.9–12.7)
PMV BLD AUTO: 11.6 FL (ref 8.9–12.7)
PMV BLD AUTO: 11.9 FL (ref 8.9–12.7)
PMV BLD AUTO: 11.9 FL (ref 8.9–12.7)
PMV BLD AUTO: 12 FL (ref 8.9–12.7)
PMV BLD AUTO: 12.1 FL (ref 8.9–12.7)
PMV BLD AUTO: 12.1 FL (ref 8.9–12.7)
PMV BLD AUTO: 12.2 FL (ref 8.9–12.7)
PMV BLD AUTO: 12.2 FL (ref 8.9–12.7)
PMV BLD AUTO: 12.4 FL (ref 8.9–12.7)
PMV BLD AUTO: 12.9 FL (ref 8.9–12.7)
PMV BLD AUTO: 13.1 FL (ref 8.9–12.7)
PMV BLD AUTO: 13.1 FL (ref 8.9–12.7)
PO2 BLDV: 154.1 MM HG (ref 35–45)
POTASSIUM SERPL-SCNC: 3.1 MMOL/L (ref 3.5–5.3)
POTASSIUM SERPL-SCNC: 3.5 MMOL/L (ref 3.5–5.3)
POTASSIUM SERPL-SCNC: 3.6 MMOL/L (ref 3.5–5.3)
POTASSIUM SERPL-SCNC: 3.6 MMOL/L (ref 3.5–5.3)
POTASSIUM SERPL-SCNC: 3.7 MMOL/L (ref 3.5–5.3)
POTASSIUM SERPL-SCNC: 3.8 MMOL/L (ref 3.5–5.3)
POTASSIUM SERPL-SCNC: 3.9 MMOL/L (ref 3.5–5.3)
POTASSIUM SERPL-SCNC: 4 MMOL/L (ref 3.5–5.3)
POTASSIUM SERPL-SCNC: 4 MMOL/L (ref 3.5–5.3)
POTASSIUM SERPL-SCNC: 4.1 MMOL/L (ref 3.5–5.3)
POTASSIUM SERPL-SCNC: 4.2 MMOL/L (ref 3.5–5.3)
POTASSIUM SERPL-SCNC: 4.2 MMOL/L (ref 3.5–5.3)
POTASSIUM SERPL-SCNC: 4.4 MMOL/L (ref 3.5–5.3)
POTASSIUM SERPL-SCNC: 4.5 MMOL/L (ref 3.5–5.3)
POTASSIUM SERPL-SCNC: 4.6 MMOL/L (ref 3.5–5.3)
POTASSIUM SERPL-SCNC: 4.7 MMOL/L (ref 3.5–5.3)
POTASSIUM SERPL-SCNC: 4.7 MMOL/L (ref 3.5–5.3)
POTASSIUM SERPL-SCNC: 5.1 MMOL/L (ref 3.5–5.3)
POTASSIUM SERPL-SCNC: 5.4 MMOL/L (ref 3.5–5.3)
POTASSIUM SERPL-SCNC: 6.6 MMOL/L (ref 3.5–5.3)
POTASSIUM SERPL-SCNC: 6.9 MMOL/L (ref 3.5–5.3)
PR INTERVAL: 154 MS
PR INTERVAL: 202 MS
PROCALCITONIN SERPL-MCNC: 0.18 NG/ML
PROCALCITONIN SERPL-MCNC: 1.28 NG/ML
PROCALCITONIN SERPL-MCNC: 1.63 NG/ML
PROT PATTERN SERPL ELPH-IMP: ABNORMAL
PROT SERPL-MCNC: 6.1 G/DL (ref 6.4–8.2)
PROT SERPL-MCNC: 6.9 G/DL (ref 6.4–8.2)
PROT SERPL-MCNC: 7.1 G/DL (ref 6.4–8.2)
PROT SERPL-MCNC: 7.2 G/DL (ref 6.4–8.2)
PROT SERPL-MCNC: 7.2 G/DL (ref 6.4–8.2)
PROT SERPL-MCNC: 7.3 G/DL (ref 6.4–8.2)
PROT SERPL-MCNC: 7.3 G/DL (ref 6.4–8.2)
PROT SERPL-MCNC: 7.4 G/DL (ref 6.4–8.2)
PROT SERPL-MCNC: 7.4 G/DL (ref 6.4–8.2)
PROT SERPL-MCNC: 7.5 G/DL (ref 6.4–8.2)
PROT SERPL-MCNC: 7.5 G/DL (ref 6.4–8.2)
PROT SERPL-MCNC: 7.6 G/DL (ref 6.4–8.2)
PROT SERPL-MCNC: 7.7 G/DL (ref 6.4–8.2)
PROT SERPL-MCNC: 7.8 G/DL (ref 6.4–8.2)
PROT UR STRIP-MCNC: ABNORMAL MG/DL
PROT UR-MCNC: 125 MG/DL
PROT UR-MCNC: 71 MG/DL
PROT UR-MCNC: 86 MG/DL
PROT UR-MCNC: 96 MG/DL
PROT/CREAT UR: 2.21 MG/G{CREAT} (ref 0–0.1)
PROT/CREAT UR: 2.36 MG/G{CREAT} (ref 0–0.1)
PROT/CREAT UR: 2.55 MG/G{CREAT} (ref 0–0.1)
PROT/CREAT UR: 2.65 MG/G{CREAT} (ref 0–0.1)
PROTHROMBIN TIME: 14.3 SECONDS (ref 11.6–14.5)
PROTHROMBIN TIME: 14.4 SECONDS (ref 11.6–14.5)
PROTHROMBIN TIME: 14.6 SECONDS (ref 11.6–14.5)
PROTHROMBIN TIME: 15.3 SECONDS (ref 11.6–14.5)
PROTHROMBIN TIME: 15.7 SECONDS (ref 11.6–14.5)
PTH-INTACT SERPL-MCNC: 177 PG/ML (ref 18.4–80.1)
QRS AXIS: -42 DEGREES
QRS AXIS: -56 DEGREES
QRSD INTERVAL: 110 MS
QRSD INTERVAL: 94 MS
QT INTERVAL: 418 MS
QT INTERVAL: 474 MS
QTC INTERVAL: 485 MS
QTC INTERVAL: 516 MS
RBC # BLD AUTO: 1.99 MILLION/UL (ref 3.81–5.12)
RBC # BLD AUTO: 2.07 MILLION/UL (ref 3.81–5.12)
RBC # BLD AUTO: 2.1 MILLION/UL (ref 3.81–5.12)
RBC # BLD AUTO: 2.11 MILLION/UL (ref 3.81–5.12)
RBC # BLD AUTO: 2.12 MILLION/UL (ref 3.81–5.12)
RBC # BLD AUTO: 2.18 MILLION/UL (ref 3.81–5.12)
RBC # BLD AUTO: 2.29 MILLION/UL (ref 3.81–5.12)
RBC # BLD AUTO: 2.32 MILLION/UL (ref 3.81–5.12)
RBC # BLD AUTO: 2.33 MILLION/UL (ref 3.81–5.12)
RBC # BLD AUTO: 2.34 MILLION/UL (ref 3.81–5.12)
RBC # BLD AUTO: 2.36 MILLION/UL (ref 3.81–5.12)
RBC # BLD AUTO: 2.38 MILLION/UL (ref 3.81–5.12)
RBC # BLD AUTO: 2.42 MILLION/UL (ref 3.81–5.12)
RBC # BLD AUTO: 2.47 MILLION/UL (ref 3.81–5.12)
RBC # BLD AUTO: 2.51 MILLION/UL (ref 3.81–5.12)
RBC # BLD AUTO: 2.61 MILLION/UL (ref 3.81–5.12)
RBC # BLD AUTO: 2.69 MILLION/UL (ref 3.81–5.12)
RBC # BLD AUTO: 2.73 MILLION/UL (ref 3.81–5.12)
RBC # BLD AUTO: 2.74 MILLION/UL (ref 3.81–5.12)
RBC # BLD AUTO: 2.83 MILLION/UL (ref 3.81–5.12)
RBC #/AREA URNS AUTO: ABNORMAL /HPF
RH BLD: POSITIVE
SALMONELLA DNA SPEC QL NAA+PROBE: NORMAL
SARS-COV-2 RNA RESP QL NAA+PROBE: NEGATIVE
SARS-COV-2 RNA SPEC QL NAA+PROBE: NOT DETECTED
SARS-COV-2 RNA SPEC QL NAA+PROBE: NOT DETECTED
SHIGA TOXIN STX GENE SPEC NAA+PROBE: NORMAL
SHIGELLA DNA SPEC QL NAA+PROBE: NORMAL
SODIUM SERPL-SCNC: 122 MMOL/L (ref 136–145)
SODIUM SERPL-SCNC: 132 MMOL/L (ref 136–145)
SODIUM SERPL-SCNC: 133 MMOL/L (ref 136–145)
SODIUM SERPL-SCNC: 134 MMOL/L (ref 136–145)
SODIUM SERPL-SCNC: 135 MMOL/L (ref 136–145)
SODIUM SERPL-SCNC: 136 MMOL/L (ref 136–145)
SODIUM SERPL-SCNC: 136 MMOL/L (ref 136–145)
SODIUM SERPL-SCNC: 137 MMOL/L (ref 136–145)
SODIUM SERPL-SCNC: 138 MMOL/L (ref 136–145)
SODIUM SERPL-SCNC: 140 MMOL/L (ref 136–145)
SODIUM SERPL-SCNC: 142 MMOL/L (ref 136–145)
SODIUM SERPL-SCNC: 142 MMOL/L (ref 136–145)
SP GR UR STRIP.AUTO: 1.01 (ref 1–1.03)
SPECIMEN EXPIRATION DATE: NORMAL
T WAVE AXIS: 64 DEGREES
T WAVE AXIS: 98 DEGREES
T4 FREE SERPL-MCNC: 1.03 NG/DL (ref 0.76–1.46)
TACROLIMUS BLD-MCNC: 2.3 NG/ML (ref 2–20)
TACROLIMUS BLD-MCNC: 2.5 NG/ML (ref 2–20)
TACROLIMUS BLD-MCNC: 3.2 NG/ML (ref 2–20)
TACROLIMUS BLD-MCNC: 3.4 NG/ML (ref 2–20)
TACROLIMUS BLD-MCNC: 5.3 NG/ML (ref 2–20)
TROPONIN I SERPL-MCNC: 0.05 NG/ML
TROPONIN I SERPL-MCNC: 0.05 NG/ML
TROPONIN I SERPL-MCNC: 0.08 NG/ML
TSH SERPL DL<=0.05 MIU/L-ACNC: 2.12 UIU/ML (ref 0.36–3.74)
TSH SERPL DL<=0.05 MIU/L-ACNC: 2.48 UIU/ML (ref 0.36–3.74)
TSH SERPL DL<=0.05 MIU/L-ACNC: 2.69 UIU/ML (ref 0.36–3.74)
TSH SERPL DL<=0.05 MIU/L-ACNC: 3.13 UIU/ML (ref 0.36–3.74)
UNIT DISPENSE STATUS: NORMAL
UNIT PRODUCT CODE: NORMAL
UNIT RH: NORMAL
UROBILINOGEN UR QL STRIP.AUTO: 0.2 E.U./DL
VENTRICULAR RATE: 63 BPM
VENTRICULAR RATE: 92 BPM
WBC # BLD AUTO: 3.99 THOUSAND/UL (ref 4.31–10.16)
WBC # BLD AUTO: 4.1 THOUSAND/UL (ref 4.31–10.16)
WBC # BLD AUTO: 5.07 THOUSAND/UL (ref 4.31–10.16)
WBC # BLD AUTO: 5.26 THOUSAND/UL (ref 4.31–10.16)
WBC # BLD AUTO: 5.27 THOUSAND/UL (ref 4.31–10.16)
WBC # BLD AUTO: 5.31 THOUSAND/UL (ref 4.31–10.16)
WBC # BLD AUTO: 5.61 THOUSAND/UL (ref 4.31–10.16)
WBC # BLD AUTO: 5.69 THOUSAND/UL (ref 4.31–10.16)
WBC # BLD AUTO: 5.72 THOUSAND/UL (ref 4.31–10.16)
WBC # BLD AUTO: 5.87 THOUSAND/UL (ref 4.31–10.16)
WBC # BLD AUTO: 5.94 THOUSAND/UL (ref 4.31–10.16)
WBC # BLD AUTO: 6.36 THOUSAND/UL (ref 4.31–10.16)
WBC # BLD AUTO: 6.43 THOUSAND/UL (ref 4.31–10.16)
WBC # BLD AUTO: 6.65 THOUSAND/UL (ref 4.31–10.16)
WBC # BLD AUTO: 6.77 THOUSAND/UL (ref 4.31–10.16)
WBC # BLD AUTO: 6.94 THOUSAND/UL (ref 4.31–10.16)
WBC # BLD AUTO: 7.03 THOUSAND/UL (ref 4.31–10.16)
WBC # BLD AUTO: 7.46 THOUSAND/UL (ref 4.31–10.16)
WBC # BLD AUTO: 7.49 THOUSAND/UL (ref 4.31–10.16)
WBC # BLD AUTO: 7.83 THOUSAND/UL (ref 4.31–10.16)
WBC #/AREA URNS AUTO: ABNORMAL /HPF
WBC CLUMPS # UR AUTO: PRESENT /UL
WBC CLUMPS # UR AUTO: YES /UL

## 2020-01-01 PROCEDURE — 85610 PROTHROMBIN TIME: CPT

## 2020-01-01 PROCEDURE — 99291 CRITICAL CARE FIRST HOUR: CPT | Performed by: EMERGENCY MEDICINE

## 2020-01-01 PROCEDURE — 99213 OFFICE O/P EST LOW 20 MIN: CPT | Performed by: SURGERY

## 2020-01-01 PROCEDURE — 83735 ASSAY OF MAGNESIUM: CPT | Performed by: INTERNAL MEDICINE

## 2020-01-01 PROCEDURE — 93010 ELECTROCARDIOGRAM REPORT: CPT | Performed by: INTERNAL MEDICINE

## 2020-01-01 PROCEDURE — 84156 ASSAY OF PROTEIN URINE: CPT

## 2020-01-01 PROCEDURE — 84156 ASSAY OF PROTEIN URINE: CPT | Performed by: INTERNAL MEDICINE

## 2020-01-01 PROCEDURE — 87177 OVA AND PARASITES SMEARS: CPT

## 2020-01-01 PROCEDURE — 1036F TOBACCO NON-USER: CPT | Performed by: PHYSICIAN ASSISTANT

## 2020-01-01 PROCEDURE — 83735 ASSAY OF MAGNESIUM: CPT | Performed by: NURSE PRACTITIONER

## 2020-01-01 PROCEDURE — 99232 SBSQ HOSP IP/OBS MODERATE 35: CPT | Performed by: INTERNAL MEDICINE

## 2020-01-01 PROCEDURE — G1004 CDSM NDSC: HCPCS

## 2020-01-01 PROCEDURE — 82948 REAGENT STRIP/BLOOD GLUCOSE: CPT

## 2020-01-01 PROCEDURE — 76937 US GUIDE VASCULAR ACCESS: CPT | Performed by: RADIOLOGY

## 2020-01-01 PROCEDURE — 88305 TISSUE EXAM BY PATHOLOGIST: CPT | Performed by: PATHOLOGY

## 2020-01-01 PROCEDURE — 86850 RBC ANTIBODY SCREEN: CPT

## 2020-01-01 PROCEDURE — 84100 ASSAY OF PHOSPHORUS: CPT | Performed by: INTERNAL MEDICINE

## 2020-01-01 PROCEDURE — 82306 VITAMIN D 25 HYDROXY: CPT

## 2020-01-01 PROCEDURE — 36415 COLL VENOUS BLD VENIPUNCTURE: CPT | Performed by: EMERGENCY MEDICINE

## 2020-01-01 PROCEDURE — 3066F NEPHROPATHY DOC TX: CPT | Performed by: FAMILY MEDICINE

## 2020-01-01 PROCEDURE — 84165 PROTEIN E-PHORESIS SERUM: CPT | Performed by: PATHOLOGY

## 2020-01-01 PROCEDURE — 2026F EYE IMG VALID EVC RTNOPTHY: CPT | Performed by: NURSE PRACTITIONER

## 2020-01-01 PROCEDURE — 99215 OFFICE O/P EST HI 40 MIN: CPT | Performed by: PHYSICIAN ASSISTANT

## 2020-01-01 PROCEDURE — 84165 PROTEIN E-PHORESIS SERUM: CPT

## 2020-01-01 PROCEDURE — 71045 X-RAY EXAM CHEST 1 VIEW: CPT

## 2020-01-01 PROCEDURE — 97535 SELF CARE MNGMENT TRAINING: CPT

## 2020-01-01 PROCEDURE — 86850 RBC ANTIBODY SCREEN: CPT | Performed by: INTERNAL MEDICINE

## 2020-01-01 PROCEDURE — 2026F EYE IMG VALID EVC RTNOPTHY: CPT | Performed by: PHYSICIAN ASSISTANT

## 2020-01-01 PROCEDURE — 83735 ASSAY OF MAGNESIUM: CPT | Performed by: EMERGENCY MEDICINE

## 2020-01-01 PROCEDURE — 87086 URINE CULTURE/COLONY COUNT: CPT

## 2020-01-01 PROCEDURE — 3066F NEPHROPATHY DOC TX: CPT | Performed by: PHYSICIAN ASSISTANT

## 2020-01-01 PROCEDURE — C1894 INTRO/SHEATH, NON-LASER: HCPCS

## 2020-01-01 PROCEDURE — 80048 BASIC METABOLIC PNL TOTAL CA: CPT | Performed by: INTERNAL MEDICINE

## 2020-01-01 PROCEDURE — 36415 COLL VENOUS BLD VENIPUNCTURE: CPT | Performed by: PHYSICIAN ASSISTANT

## 2020-01-01 PROCEDURE — 93000 ELECTROCARDIOGRAM COMPLETE: CPT | Performed by: INTERNAL MEDICINE

## 2020-01-01 PROCEDURE — 90935 HEMODIALYSIS ONE EVALUATION: CPT | Performed by: INTERNAL MEDICINE

## 2020-01-01 PROCEDURE — 99214 OFFICE O/P EST MOD 30 MIN: CPT | Performed by: PSYCHIATRY & NEUROLOGY

## 2020-01-01 PROCEDURE — 80053 COMPREHEN METABOLIC PANEL: CPT

## 2020-01-01 PROCEDURE — P9040 RBC LEUKOREDUCED IRRADIATED: HCPCS

## 2020-01-01 PROCEDURE — 36415 COLL VENOUS BLD VENIPUNCTURE: CPT

## 2020-01-01 PROCEDURE — 86803 HEPATITIS C AB TEST: CPT

## 2020-01-01 PROCEDURE — 80048 BASIC METABOLIC PNL TOTAL CA: CPT

## 2020-01-01 PROCEDURE — U0003 INFECTIOUS AGENT DETECTION BY NUCLEIC ACID (DNA OR RNA); SEVERE ACUTE RESPIRATORY SYNDROME CORONAVIRUS 2 (SARS-COV-2) (CORONAVIRUS DISEASE [COVID-19]), AMPLIFIED PROBE TECHNIQUE, MAKING USE OF HIGH THROUGHPUT TECHNOLOGIES AS DESCRIBED BY CMS-2020-01-R: HCPCS | Performed by: FAMILY MEDICINE

## 2020-01-01 PROCEDURE — 83605 ASSAY OF LACTIC ACID: CPT | Performed by: EMERGENCY MEDICINE

## 2020-01-01 PROCEDURE — 85610 PROTHROMBIN TIME: CPT | Performed by: EMERGENCY MEDICINE

## 2020-01-01 PROCEDURE — 82941 ASSAY OF GASTRIN: CPT | Performed by: INTERNAL MEDICINE

## 2020-01-01 PROCEDURE — 84443 ASSAY THYROID STIM HORMONE: CPT

## 2020-01-01 PROCEDURE — 86900 BLOOD TYPING SEROLOGIC ABO: CPT

## 2020-01-01 PROCEDURE — 86334 IMMUNOFIX E-PHORESIS SERUM: CPT | Performed by: PATHOLOGY

## 2020-01-01 PROCEDURE — 82570 ASSAY OF URINE CREATININE: CPT | Performed by: INTERNAL MEDICINE

## 2020-01-01 PROCEDURE — 99152 MOD SED SAME PHYS/QHP 5/>YRS: CPT

## 2020-01-01 PROCEDURE — 82805 BLOOD GASES W/O2 SATURATION: CPT | Performed by: EMERGENCY MEDICINE

## 2020-01-01 PROCEDURE — C9113 INJ PANTOPRAZOLE SODIUM, VIA: HCPCS | Performed by: INTERNAL MEDICINE

## 2020-01-01 PROCEDURE — 1036F TOBACCO NON-USER: CPT | Performed by: INTERNAL MEDICINE

## 2020-01-01 PROCEDURE — 85025 COMPLETE CBC W/AUTO DIFF WBC: CPT

## 2020-01-01 PROCEDURE — 83883 ASSAY NEPHELOMETRY NOT SPEC: CPT

## 2020-01-01 PROCEDURE — 86900 BLOOD TYPING SEROLOGIC ABO: CPT | Performed by: INTERNAL MEDICINE

## 2020-01-01 PROCEDURE — 2026F EYE IMG VALID EVC RTNOPTHY: CPT | Performed by: INTERNAL MEDICINE

## 2020-01-01 PROCEDURE — 82570 ASSAY OF URINE CREATININE: CPT

## 2020-01-01 PROCEDURE — 3044F HG A1C LEVEL LT 7.0%: CPT | Performed by: PHYSICIAN ASSISTANT

## 2020-01-01 PROCEDURE — 83970 ASSAY OF PARATHORMONE: CPT

## 2020-01-01 PROCEDURE — 86870 RBC ANTIBODY IDENTIFICATION: CPT

## 2020-01-01 PROCEDURE — 99223 1ST HOSP IP/OBS HIGH 75: CPT | Performed by: INTERNAL MEDICINE

## 2020-01-01 PROCEDURE — 84100 ASSAY OF PHOSPHORUS: CPT

## 2020-01-01 PROCEDURE — 84443 ASSAY THYROID STIM HORMONE: CPT | Performed by: INTERNAL MEDICINE

## 2020-01-01 PROCEDURE — 85025 COMPLETE CBC W/AUTO DIFF WBC: CPT | Performed by: PHYSICIAN ASSISTANT

## 2020-01-01 PROCEDURE — 86902 BLOOD TYPE ANTIGEN DONOR EA: CPT

## 2020-01-01 PROCEDURE — 82784 ASSAY IGA/IGD/IGG/IGM EACH: CPT

## 2020-01-01 PROCEDURE — 36589 REMOVAL TUNNELED CV CATH: CPT | Performed by: RADIOLOGY

## 2020-01-01 PROCEDURE — 86901 BLOOD TYPING SEROLOGIC RH(D): CPT

## 2020-01-01 PROCEDURE — 86922 COMPATIBILITY TEST ANTIGLOB: CPT

## 2020-01-01 PROCEDURE — 74174 CTA ABD&PLVS W/CONTRAST: CPT

## 2020-01-01 PROCEDURE — 84439 ASSAY OF FREE THYROXINE: CPT

## 2020-01-01 PROCEDURE — 96375 TX/PRO/DX INJ NEW DRUG ADDON: CPT

## 2020-01-01 PROCEDURE — 97110 THERAPEUTIC EXERCISES: CPT | Performed by: PHYSICAL THERAPIST

## 2020-01-01 PROCEDURE — 87493 C DIFF AMPLIFIED PROBE: CPT

## 2020-01-01 PROCEDURE — 84484 ASSAY OF TROPONIN QUANT: CPT | Performed by: EMERGENCY MEDICINE

## 2020-01-01 PROCEDURE — 93990 DOPPLER FLOW TESTING: CPT

## 2020-01-01 PROCEDURE — 1111F DSCHRG MED/CURRENT MED MERGE: CPT | Performed by: FAMILY MEDICINE

## 2020-01-01 PROCEDURE — 86704 HEP B CORE ANTIBODY TOTAL: CPT

## 2020-01-01 PROCEDURE — 80053 COMPREHEN METABOLIC PANEL: CPT | Performed by: INTERNAL MEDICINE

## 2020-01-01 PROCEDURE — 85025 COMPLETE CBC W/AUTO DIFF WBC: CPT | Performed by: NURSE PRACTITIONER

## 2020-01-01 PROCEDURE — 99233 SBSQ HOSP IP/OBS HIGH 50: CPT | Performed by: INTERNAL MEDICINE

## 2020-01-01 PROCEDURE — 93005 ELECTROCARDIOGRAM TRACING: CPT

## 2020-01-01 PROCEDURE — G0008 ADMIN INFLUENZA VIRUS VAC: HCPCS

## 2020-01-01 PROCEDURE — 77001 FLUOROGUIDE FOR VEIN DEVICE: CPT | Performed by: RADIOLOGY

## 2020-01-01 PROCEDURE — 84132 ASSAY OF SERUM POTASSIUM: CPT | Performed by: SURGERY

## 2020-01-01 PROCEDURE — 83036 HEMOGLOBIN GLYCOSYLATED A1C: CPT | Performed by: PHYSICIAN ASSISTANT

## 2020-01-01 PROCEDURE — 3008F BODY MASS INDEX DOCD: CPT | Performed by: PHYSICIAN ASSISTANT

## 2020-01-01 PROCEDURE — 96372 THER/PROPH/DIAG INJ SC/IM: CPT

## 2020-01-01 PROCEDURE — 1111F DSCHRG MED/CURRENT MED MERGE: CPT | Performed by: PHYSICIAN ASSISTANT

## 2020-01-01 PROCEDURE — 81001 URINALYSIS AUTO W/SCOPE: CPT

## 2020-01-01 PROCEDURE — 87181 SC STD AGAR DILUTION PER AGT: CPT | Performed by: EMERGENCY MEDICINE

## 2020-01-01 PROCEDURE — 1111F DSCHRG MED/CURRENT MED MERGE: CPT

## 2020-01-01 PROCEDURE — 93990 DOPPLER FLOW TESTING: CPT | Performed by: SURGERY

## 2020-01-01 PROCEDURE — 90682 RIV4 VACC RECOMBINANT DNA IM: CPT

## 2020-01-01 PROCEDURE — 85027 COMPLETE CBC AUTOMATED: CPT

## 2020-01-01 PROCEDURE — 84443 ASSAY THYROID STIM HORMONE: CPT | Performed by: EMERGENCY MEDICINE

## 2020-01-01 PROCEDURE — 36430 TRANSFUSION BLD/BLD COMPNT: CPT

## 2020-01-01 PROCEDURE — 1036F TOBACCO NON-USER: CPT | Performed by: FAMILY MEDICINE

## 2020-01-01 PROCEDURE — 70450 CT HEAD/BRAIN W/O DYE: CPT

## 2020-01-01 PROCEDURE — 87077 CULTURE AEROBIC IDENTIFY: CPT

## 2020-01-01 PROCEDURE — 99442 PR PHYS/QHP TELEPHONE EVALUATION 11-20 MIN: CPT | Performed by: INTERNAL MEDICINE

## 2020-01-01 PROCEDURE — 99443 PR PHYS/QHP TELEPHONE EVALUATION 21-30 MIN: CPT | Performed by: PHYSICIAN ASSISTANT

## 2020-01-01 PROCEDURE — 80197 ASSAY OF TACROLIMUS: CPT

## 2020-01-01 PROCEDURE — 0DB58ZX EXCISION OF ESOPHAGUS, VIA NATURAL OR ARTIFICIAL OPENING ENDOSCOPIC, DIAGNOSTIC: ICD-10-PCS | Performed by: INTERNAL MEDICINE

## 2020-01-01 PROCEDURE — 80053 COMPREHEN METABOLIC PANEL: CPT | Performed by: EMERGENCY MEDICINE

## 2020-01-01 PROCEDURE — 3077F SYST BP >= 140 MM HG: CPT | Performed by: PHYSICIAN ASSISTANT

## 2020-01-01 PROCEDURE — 87186 SC STD MICRODIL/AGAR DIL: CPT

## 2020-01-01 PROCEDURE — 83690 ASSAY OF LIPASE: CPT | Performed by: PHYSICIAN ASSISTANT

## 2020-01-01 PROCEDURE — 3066F NEPHROPATHY DOC TX: CPT | Performed by: INTERNAL MEDICINE

## 2020-01-01 PROCEDURE — 99215 OFFICE O/P EST HI 40 MIN: CPT | Performed by: INTERNAL MEDICINE

## 2020-01-01 PROCEDURE — 80048 BASIC METABOLIC PNL TOTAL CA: CPT | Performed by: EMERGENCY MEDICINE

## 2020-01-01 PROCEDURE — 99213 OFFICE O/P EST LOW 20 MIN: CPT | Performed by: PHYSICIAN ASSISTANT

## 2020-01-01 PROCEDURE — 80053 COMPREHEN METABOLIC PANEL: CPT | Performed by: PHYSICIAN ASSISTANT

## 2020-01-01 PROCEDURE — 36558 INSERT TUNNELED CV CATH: CPT | Performed by: RADIOLOGY

## 2020-01-01 PROCEDURE — 87077 CULTURE AEROBIC IDENTIFY: CPT | Performed by: EMERGENCY MEDICINE

## 2020-01-01 PROCEDURE — 87040 BLOOD CULTURE FOR BACTERIA: CPT | Performed by: EMERGENCY MEDICINE

## 2020-01-01 PROCEDURE — 99285 EMERGENCY DEPT VISIT HI MDM: CPT

## 2020-01-01 PROCEDURE — 3008F BODY MASS INDEX DOCD: CPT | Performed by: INTERNAL MEDICINE

## 2020-01-01 PROCEDURE — 1111F DSCHRG MED/CURRENT MED MERGE: CPT | Performed by: INTERNAL MEDICINE

## 2020-01-01 PROCEDURE — 86880 COOMBS TEST DIRECT: CPT | Performed by: INTERNAL MEDICINE

## 2020-01-01 PROCEDURE — 85730 THROMBOPLASTIN TIME PARTIAL: CPT | Performed by: EMERGENCY MEDICINE

## 2020-01-01 PROCEDURE — 99222 1ST HOSP IP/OBS MODERATE 55: CPT | Performed by: PHYSICIAN ASSISTANT

## 2020-01-01 PROCEDURE — 93985 DUP-SCAN HEMO COMPL BI STD: CPT

## 2020-01-01 PROCEDURE — 87186 SC STD MICRODIL/AGAR DIL: CPT | Performed by: EMERGENCY MEDICINE

## 2020-01-01 PROCEDURE — 85025 COMPLETE CBC W/AUTO DIFF WBC: CPT | Performed by: EMERGENCY MEDICINE

## 2020-01-01 PROCEDURE — 84145 PROCALCITONIN (PCT): CPT | Performed by: PHYSICIAN ASSISTANT

## 2020-01-01 PROCEDURE — 3075F SYST BP GE 130 - 139MM HG: CPT | Performed by: PHYSICIAN ASSISTANT

## 2020-01-01 PROCEDURE — 82784 ASSAY IGA/IGD/IGG/IGM EACH: CPT | Performed by: PHYSICIAN ASSISTANT

## 2020-01-01 PROCEDURE — 99024 POSTOP FOLLOW-UP VISIT: CPT | Performed by: NURSE PRACTITIONER

## 2020-01-01 PROCEDURE — 0DB98ZX EXCISION OF DUODENUM, VIA NATURAL OR ARTIFICIAL OPENING ENDOSCOPIC, DIAGNOSTIC: ICD-10-PCS | Performed by: INTERNAL MEDICINE

## 2020-01-01 PROCEDURE — U0003 INFECTIOUS AGENT DETECTION BY NUCLEIC ACID (DNA OR RNA); SEVERE ACUTE RESPIRATORY SYNDROME CORONAVIRUS 2 (SARS-COV-2) (CORONAVIRUS DISEASE [COVID-19]), AMPLIFIED PROBE TECHNIQUE, MAKING USE OF HIGH THROUGHPUT TECHNOLOGIES AS DESCRIBED BY CMS-2020-01-R: HCPCS

## 2020-01-01 PROCEDURE — 3066F NEPHROPATHY DOC TX: CPT | Performed by: NURSE PRACTITIONER

## 2020-01-01 PROCEDURE — 92526 ORAL FUNCTION THERAPY: CPT

## 2020-01-01 PROCEDURE — 86921 COMPATIBILITY TEST INCUBATE: CPT

## 2020-01-01 PROCEDURE — 83036 HEMOGLOBIN GLYCOSYLATED A1C: CPT

## 2020-01-01 PROCEDURE — 83690 ASSAY OF LIPASE: CPT | Performed by: EMERGENCY MEDICINE

## 2020-01-01 PROCEDURE — 99239 HOSP IP/OBS DSCHRG MGMT >30: CPT | Performed by: INTERNAL MEDICINE

## 2020-01-01 PROCEDURE — 97167 OT EVAL HIGH COMPLEX 60 MIN: CPT

## 2020-01-01 PROCEDURE — 99215 OFFICE O/P EST HI 40 MIN: CPT | Performed by: FAMILY MEDICINE

## 2020-01-01 PROCEDURE — 80048 BASIC METABOLIC PNL TOTAL CA: CPT | Performed by: NURSE PRACTITIONER

## 2020-01-01 PROCEDURE — 1036F TOBACCO NON-USER: CPT | Performed by: SURGERY

## 2020-01-01 PROCEDURE — 97112 NEUROMUSCULAR REEDUCATION: CPT | Performed by: PHYSICAL THERAPIST

## 2020-01-01 PROCEDURE — 85027 COMPLETE CBC AUTOMATED: CPT | Performed by: PHYSICIAN ASSISTANT

## 2020-01-01 PROCEDURE — 87340 HEPATITIS B SURFACE AG IA: CPT

## 2020-01-01 PROCEDURE — 83605 ASSAY OF LACTIC ACID: CPT | Performed by: INTERNAL MEDICINE

## 2020-01-01 PROCEDURE — 3078F DIAST BP <80 MM HG: CPT | Performed by: PHYSICIAN ASSISTANT

## 2020-01-01 PROCEDURE — 3008F BODY MASS INDEX DOCD: CPT | Performed by: FAMILY MEDICINE

## 2020-01-01 PROCEDURE — 99215 OFFICE O/P EST HI 40 MIN: CPT | Performed by: SURGERY

## 2020-01-01 PROCEDURE — 83883 ASSAY NEPHELOMETRY NOT SPEC: CPT | Performed by: PHYSICIAN ASSISTANT

## 2020-01-01 PROCEDURE — 2026F EYE IMG VALID EVC RTNOPTHY: CPT | Performed by: FAMILY MEDICINE

## 2020-01-01 PROCEDURE — 92610 EVALUATE SWALLOWING FUNCTION: CPT

## 2020-01-01 PROCEDURE — 86870 RBC ANTIBODY IDENTIFICATION: CPT | Performed by: INTERNAL MEDICINE

## 2020-01-01 PROCEDURE — 36589 REMOVAL TUNNELED CV CATH: CPT

## 2020-01-01 PROCEDURE — 99285 EMERGENCY DEPT VISIT HI MDM: CPT | Performed by: EMERGENCY MEDICINE

## 2020-01-01 PROCEDURE — 99153 MOD SED SAME PHYS/QHP EA: CPT

## 2020-01-01 PROCEDURE — 85027 COMPLETE CBC AUTOMATED: CPT | Performed by: INTERNAL MEDICINE

## 2020-01-01 PROCEDURE — 85049 AUTOMATED PLATELET COUNT: CPT | Performed by: INTERNAL MEDICINE

## 2020-01-01 PROCEDURE — 86901 BLOOD TYPING SEROLOGIC RH(D): CPT | Performed by: INTERNAL MEDICINE

## 2020-01-01 PROCEDURE — 5A1D70Z PERFORMANCE OF URINARY FILTRATION, INTERMITTENT, LESS THAN 6 HOURS PER DAY: ICD-10-PCS | Performed by: INTERNAL MEDICINE

## 2020-01-01 PROCEDURE — 97163 PT EVAL HIGH COMPLEX 45 MIN: CPT

## 2020-01-01 PROCEDURE — 93971 EXTREMITY STUDY: CPT

## 2020-01-01 PROCEDURE — 93971 EXTREMITY STUDY: CPT | Performed by: SURGERY

## 2020-01-01 PROCEDURE — 81001 URINALYSIS AUTO W/SCOPE: CPT | Performed by: EMERGENCY MEDICINE

## 2020-01-01 PROCEDURE — 85025 COMPLETE CBC W/AUTO DIFF WBC: CPT | Performed by: INTERNAL MEDICINE

## 2020-01-01 PROCEDURE — 99214 OFFICE O/P EST MOD 30 MIN: CPT | Performed by: PHYSICIAN ASSISTANT

## 2020-01-01 PROCEDURE — 99496 TRANSJ CARE MGMT HIGH F2F 7D: CPT | Performed by: FAMILY MEDICINE

## 2020-01-01 PROCEDURE — 84165 PROTEIN E-PHORESIS SERUM: CPT | Performed by: PHYSICIAN ASSISTANT

## 2020-01-01 PROCEDURE — 87086 URINE CULTURE/COLONY COUNT: CPT | Performed by: EMERGENCY MEDICINE

## 2020-01-01 PROCEDURE — 87493 C DIFF AMPLIFIED PROBE: CPT | Performed by: PHYSICIAN ASSISTANT

## 2020-01-01 PROCEDURE — 87209 SMEAR COMPLEX STAIN: CPT

## 2020-01-01 PROCEDURE — 99152 MOD SED SAME PHYS/QHP 5/>YRS: CPT | Performed by: RADIOLOGY

## 2020-01-01 PROCEDURE — 0DB68ZX EXCISION OF STOMACH, VIA NATURAL OR ARTIFICIAL OPENING ENDOSCOPIC, DIAGNOSTIC: ICD-10-PCS | Performed by: INTERNAL MEDICINE

## 2020-01-01 PROCEDURE — 96361 HYDRATE IV INFUSION ADD-ON: CPT

## 2020-01-01 PROCEDURE — 80197 ASSAY OF TACROLIMUS: CPT | Performed by: INTERNAL MEDICINE

## 2020-01-01 PROCEDURE — 84145 PROCALCITONIN (PCT): CPT | Performed by: EMERGENCY MEDICINE

## 2020-01-01 PROCEDURE — 36821 AV FUSION DIRECT ANY SITE: CPT | Performed by: SURGERY

## 2020-01-01 PROCEDURE — 99214 OFFICE O/P EST MOD 30 MIN: CPT | Performed by: INTERNAL MEDICINE

## 2020-01-01 PROCEDURE — 81001 URINALYSIS AUTO W/SCOPE: CPT | Performed by: INTERNAL MEDICINE

## 2020-01-01 PROCEDURE — 3008F BODY MASS INDEX DOCD: CPT | Performed by: NURSE PRACTITIONER

## 2020-01-01 PROCEDURE — C1750 CATH, HEMODIALYSIS,LONG-TERM: HCPCS

## 2020-01-01 PROCEDURE — 83690 ASSAY OF LIPASE: CPT | Performed by: INTERNAL MEDICINE

## 2020-01-01 PROCEDURE — 43239 EGD BIOPSY SINGLE/MULTIPLE: CPT | Performed by: INTERNAL MEDICINE

## 2020-01-01 PROCEDURE — 85610 PROTHROMBIN TIME: CPT | Performed by: RADIOLOGY

## 2020-01-01 PROCEDURE — 96374 THER/PROPH/DIAG INJ IV PUSH: CPT

## 2020-01-01 PROCEDURE — 96365 THER/PROPH/DIAG IV INF INIT: CPT

## 2020-01-01 PROCEDURE — 71046 X-RAY EXAM CHEST 2 VIEWS: CPT

## 2020-01-01 PROCEDURE — 74176 CT ABD & PELVIS W/O CONTRAST: CPT

## 2020-01-01 PROCEDURE — 74018 RADEX ABDOMEN 1 VIEW: CPT

## 2020-01-01 PROCEDURE — 2026F EYE IMG VALID EVC RTNOPTHY: CPT | Performed by: SURGERY

## 2020-01-01 PROCEDURE — 3008F BODY MASS INDEX DOCD: CPT | Performed by: SURGERY

## 2020-01-01 PROCEDURE — 87505 NFCT AGENT DETECTION GI: CPT

## 2020-01-01 PROCEDURE — 99213 OFFICE O/P EST LOW 20 MIN: CPT | Performed by: FAMILY MEDICINE

## 2020-01-01 PROCEDURE — 84100 ASSAY OF PHOSPHORUS: CPT | Performed by: EMERGENCY MEDICINE

## 2020-01-01 PROCEDURE — 99291 CRITICAL CARE FIRST HOUR: CPT

## 2020-01-01 PROCEDURE — 36558 INSERT TUNNELED CV CATH: CPT

## 2020-01-01 PROCEDURE — 86705 HEP B CORE ANTIBODY IGM: CPT

## 2020-01-01 PROCEDURE — 96366 THER/PROPH/DIAG IV INF ADDON: CPT

## 2020-01-01 PROCEDURE — 82150 ASSAY OF AMYLASE: CPT | Performed by: PHYSICIAN ASSISTANT

## 2020-01-01 PROCEDURE — 87081 CULTURE SCREEN ONLY: CPT | Performed by: INTERNAL MEDICINE

## 2020-01-01 PROCEDURE — 85049 AUTOMATED PLATELET COUNT: CPT | Performed by: PHYSICIAN ASSISTANT

## 2020-01-01 PROCEDURE — 84145 PROCALCITONIN (PCT): CPT | Performed by: INTERNAL MEDICINE

## 2020-01-01 PROCEDURE — 90833 PSYTX W PT W E/M 30 MIN: CPT | Performed by: PSYCHIATRY & NEUROLOGY

## 2020-01-01 PROCEDURE — 83735 ASSAY OF MAGNESIUM: CPT

## 2020-01-01 PROCEDURE — 86706 HEP B SURFACE ANTIBODY: CPT

## 2020-01-01 PROCEDURE — 3066F NEPHROPATHY DOC TX: CPT | Performed by: SURGERY

## 2020-01-01 PROCEDURE — 96367 TX/PROPH/DG ADDL SEQ IV INF: CPT

## 2020-01-01 PROCEDURE — 87635 SARS-COV-2 COVID-19 AMP PRB: CPT | Performed by: EMERGENCY MEDICINE

## 2020-01-01 PROCEDURE — 82010 KETONE BODYS QUAN: CPT | Performed by: EMERGENCY MEDICINE

## 2020-01-01 PROCEDURE — 84484 ASSAY OF TROPONIN QUANT: CPT | Performed by: PHYSICIAN ASSISTANT

## 2020-01-01 PROCEDURE — 93985 DUP-SCAN HEMO COMPL BI STD: CPT | Performed by: SURGERY

## 2020-01-01 RX ORDER — SERTRALINE HYDROCHLORIDE 100 MG/1
TABLET, FILM COATED ORAL
Qty: 180 TABLET | Refills: 0 | OUTPATIENT
Start: 2020-01-01 | End: 2020-01-01

## 2020-01-01 RX ORDER — SERTRALINE HYDROCHLORIDE 100 MG/1
200 TABLET, FILM COATED ORAL DAILY
Qty: 180 TABLET | Refills: 0 | Status: SHIPPED | OUTPATIENT
Start: 2020-01-01 | End: 2020-01-01 | Stop reason: SDUPTHER

## 2020-01-01 RX ORDER — CHLORHEXIDINE GLUCONATE 0.12 MG/ML
15 RINSE ORAL ONCE
Status: DISCONTINUED | OUTPATIENT
Start: 2020-01-01 | End: 2020-01-01

## 2020-01-01 RX ORDER — SODIUM CHLORIDE 9 MG/ML
20 INJECTION, SOLUTION INTRAVENOUS ONCE
Status: CANCELLED | OUTPATIENT
Start: 2020-01-01

## 2020-01-01 RX ORDER — METOCLOPRAMIDE HYDROCHLORIDE 5 MG/ML
INJECTION INTRAMUSCULAR; INTRAVENOUS AS NEEDED
Status: DISCONTINUED | OUTPATIENT
Start: 2020-01-01 | End: 2020-01-01 | Stop reason: SURG

## 2020-01-01 RX ORDER — ARIPIPRAZOLE 15 MG/1
30 TABLET ORAL
Status: DISCONTINUED | OUTPATIENT
Start: 2020-01-01 | End: 2020-01-01 | Stop reason: HOSPADM

## 2020-01-01 RX ORDER — LOPERAMIDE HYDROCHLORIDE 2 MG/1
2 CAPSULE ORAL 3 TIMES DAILY PRN
Status: DISCONTINUED | OUTPATIENT
Start: 2020-01-01 | End: 2020-01-01 | Stop reason: HOSPADM

## 2020-01-01 RX ORDER — HYDRALAZINE HYDROCHLORIDE 100 MG/1
100 TABLET, FILM COATED ORAL 3 TIMES DAILY
Qty: 270 TABLET | Refills: 3 | OUTPATIENT
Start: 2020-01-01

## 2020-01-01 RX ORDER — PANTOPRAZOLE SODIUM 40 MG/1
40 INJECTION, POWDER, FOR SOLUTION INTRAVENOUS EVERY 12 HOURS SCHEDULED
Status: DISCONTINUED | OUTPATIENT
Start: 2020-01-01 | End: 2020-01-01

## 2020-01-01 RX ORDER — PREDNISONE 1 MG/1
TABLET ORAL
Qty: 90 TABLET | Refills: 3 | Status: SHIPPED | OUTPATIENT
Start: 2020-01-01 | End: 2021-01-01

## 2020-01-01 RX ORDER — ROPINIROLE 0.25 MG/1
TABLET, FILM COATED ORAL
Qty: 180 TABLET | Refills: 1 | OUTPATIENT
Start: 2020-01-01

## 2020-01-01 RX ORDER — DIPHENOXYLATE HYDROCHLORIDE AND ATROPINE SULFATE 2.5; .025 MG/1; MG/1
1 TABLET ORAL 4 TIMES DAILY PRN
Qty: 30 TABLET | Refills: 0 | Status: SHIPPED | OUTPATIENT
Start: 2020-01-01 | End: 2020-01-01 | Stop reason: SDUPTHER

## 2020-01-01 RX ORDER — ACETAMINOPHEN 325 MG/1
650 TABLET ORAL EVERY 6 HOURS PRN
Status: DISCONTINUED | OUTPATIENT
Start: 2020-01-01 | End: 2020-01-01 | Stop reason: HOSPADM

## 2020-01-01 RX ORDER — FUROSEMIDE 10 MG/ML
20 INJECTION INTRAMUSCULAR; INTRAVENOUS ONCE
Status: CANCELLED
Start: 2020-01-01

## 2020-01-01 RX ORDER — PROPOFOL 10 MG/ML
INJECTION, EMULSION INTRAVENOUS AS NEEDED
Status: DISCONTINUED | OUTPATIENT
Start: 2020-01-01 | End: 2020-01-01

## 2020-01-01 RX ORDER — FUROSEMIDE 10 MG/ML
20 INJECTION INTRAMUSCULAR; INTRAVENOUS ONCE
Status: CANCELLED | OUTPATIENT
Start: 2020-01-01

## 2020-01-01 RX ORDER — HYDROMORPHONE HCL/PF 1 MG/ML
0.5 SYRINGE (ML) INJECTION EVERY 4 HOURS PRN
Status: DISCONTINUED | OUTPATIENT
Start: 2020-01-01 | End: 2020-01-01 | Stop reason: HOSPADM

## 2020-01-01 RX ORDER — ONDANSETRON 2 MG/ML
4 INJECTION INTRAMUSCULAR; INTRAVENOUS ONCE
Status: COMPLETED | OUTPATIENT
Start: 2020-01-01 | End: 2020-01-01

## 2020-01-01 RX ORDER — ONDANSETRON 2 MG/ML
4 INJECTION INTRAMUSCULAR; INTRAVENOUS ONCE AS NEEDED
Status: DISCONTINUED | OUTPATIENT
Start: 2020-01-01 | End: 2020-01-01 | Stop reason: HOSPADM

## 2020-01-01 RX ORDER — ROPINIROLE 0.25 MG/1
0.25 TABLET, FILM COATED ORAL 2 TIMES DAILY
Status: DISCONTINUED | OUTPATIENT
Start: 2020-01-01 | End: 2020-01-01 | Stop reason: HOSPADM

## 2020-01-01 RX ORDER — SEVELAMER HYDROCHLORIDE 800 MG/1
1600 TABLET, FILM COATED ORAL
Qty: 90 TABLET | Refills: 1 | Status: SHIPPED | OUTPATIENT
Start: 2020-01-01 | End: 2021-01-01

## 2020-01-01 RX ORDER — LEVOTHYROXINE SODIUM 0.12 MG/1
TABLET ORAL
Qty: 90 TABLET | Refills: 0 | Status: SHIPPED | OUTPATIENT
Start: 2020-01-01 | End: 2020-01-01

## 2020-01-01 RX ORDER — ROPINIROLE 0.25 MG/1
0.25 TABLET, FILM COATED ORAL 2 TIMES DAILY
Qty: 180 TABLET | Refills: 1 | Status: SHIPPED | OUTPATIENT
Start: 2020-01-01 | End: 2021-01-01 | Stop reason: HOSPADM

## 2020-01-01 RX ORDER — PROCHLORPERAZINE MALEATE 10 MG
10 TABLET ORAL EVERY 6 HOURS PRN
Status: DISCONTINUED | OUTPATIENT
Start: 2020-01-01 | End: 2020-01-01 | Stop reason: HOSPADM

## 2020-01-01 RX ORDER — ONDANSETRON 2 MG/ML
4 INJECTION INTRAMUSCULAR; INTRAVENOUS EVERY 4 HOURS PRN
Status: DISCONTINUED | OUTPATIENT
Start: 2020-01-01 | End: 2020-01-01 | Stop reason: HOSPADM

## 2020-01-01 RX ORDER — HEPARIN SODIUM 1000 [USP'U]/ML
INJECTION, SOLUTION INTRAVENOUS; SUBCUTANEOUS AS NEEDED
Status: DISCONTINUED | OUTPATIENT
Start: 2020-01-01 | End: 2020-01-01 | Stop reason: SURG

## 2020-01-01 RX ORDER — LEVOTHYROXINE SODIUM 0.12 MG/1
TABLET ORAL
Qty: 90 TABLET | Refills: 1 | Status: SHIPPED | OUTPATIENT
Start: 2020-01-01 | End: 2021-01-01 | Stop reason: HOSPADM

## 2020-01-01 RX ORDER — MIDAZOLAM HYDROCHLORIDE 2 MG/2ML
INJECTION, SOLUTION INTRAMUSCULAR; INTRAVENOUS AS NEEDED
Status: DISCONTINUED | OUTPATIENT
Start: 2020-01-01 | End: 2020-01-01 | Stop reason: SURG

## 2020-01-01 RX ORDER — INSULIN GLARGINE 100 [IU]/ML
3 INJECTION, SOLUTION SUBCUTANEOUS
Status: DISCONTINUED | OUTPATIENT
Start: 2020-01-01 | End: 2020-01-01

## 2020-01-01 RX ORDER — ASPIRIN 81 MG/1
81 TABLET ORAL DAILY
Status: DISCONTINUED | OUTPATIENT
Start: 2020-01-01 | End: 2020-01-01 | Stop reason: HOSPADM

## 2020-01-01 RX ORDER — BUSPIRONE HYDROCHLORIDE 5 MG/1
TABLET ORAL
Qty: 180 TABLET | Refills: 1 | Status: SHIPPED | OUTPATIENT
Start: 2020-01-01 | End: 2020-01-01 | Stop reason: SDUPTHER

## 2020-01-01 RX ORDER — CEFAZOLIN SODIUM 1 G/50ML
1000 SOLUTION INTRAVENOUS EVERY 24 HOURS
Status: DISCONTINUED | OUTPATIENT
Start: 2020-01-01 | End: 2020-01-01 | Stop reason: HOSPADM

## 2020-01-01 RX ORDER — SODIUM BICARBONATE 650 MG/1
1300 TABLET ORAL
Status: DISCONTINUED | OUTPATIENT
Start: 2020-01-01 | End: 2020-01-01

## 2020-01-01 RX ORDER — PRAVASTATIN SODIUM 80 MG/1
80 TABLET ORAL DAILY
Status: DISCONTINUED | OUTPATIENT
Start: 2020-01-01 | End: 2020-01-01 | Stop reason: HOSPADM

## 2020-01-01 RX ORDER — AMLODIPINE BESYLATE 10 MG/1
TABLET ORAL
Qty: 45 TABLET | Refills: 3 | Status: SHIPPED | OUTPATIENT
Start: 2020-01-01 | End: 2020-01-01

## 2020-01-01 RX ORDER — FENTANYL CITRATE/PF 50 MCG/ML
50 SYRINGE (ML) INJECTION
Status: DISCONTINUED | OUTPATIENT
Start: 2020-01-01 | End: 2020-01-01 | Stop reason: HOSPADM

## 2020-01-01 RX ORDER — SODIUM CHLORIDE, SODIUM GLUCONATE, SODIUM ACETATE, POTASSIUM CHLORIDE, MAGNESIUM CHLORIDE, SODIUM PHOSPHATE, DIBASIC, AND POTASSIUM PHOSPHATE .53; .5; .37; .037; .03; .012; .00082 G/100ML; G/100ML; G/100ML; G/100ML; G/100ML; G/100ML; G/100ML
1000 INJECTION, SOLUTION INTRAVENOUS ONCE
Status: COMPLETED | OUTPATIENT
Start: 2020-01-01 | End: 2020-01-01

## 2020-01-01 RX ORDER — ROPINIROLE 0.25 MG/1
TABLET, FILM COATED ORAL
Qty: 180 TABLET | Refills: 1 | Status: SHIPPED | OUTPATIENT
Start: 2020-01-01 | End: 2020-01-01 | Stop reason: SDUPTHER

## 2020-01-01 RX ORDER — ROCURONIUM BROMIDE 10 MG/ML
INJECTION, SOLUTION INTRAVENOUS AS NEEDED
Status: DISCONTINUED | OUTPATIENT
Start: 2020-01-01 | End: 2020-01-01 | Stop reason: SURG

## 2020-01-01 RX ORDER — LORAZEPAM 2 MG/1
2 TABLET ORAL 3 TIMES DAILY PRN
Qty: 90 TABLET | Refills: 4 | Status: SHIPPED | OUTPATIENT
Start: 2020-01-01 | End: 2020-01-01 | Stop reason: SDUPTHER

## 2020-01-01 RX ORDER — PREDNISONE 1 MG/1
5 TABLET ORAL DAILY
Status: DISCONTINUED | OUTPATIENT
Start: 2020-01-01 | End: 2020-01-01 | Stop reason: HOSPADM

## 2020-01-01 RX ORDER — OXYCODONE HYDROCHLORIDE 5 MG/1
2.5 TABLET ORAL EVERY 6 HOURS PRN
Status: DISCONTINUED | OUTPATIENT
Start: 2020-01-01 | End: 2020-01-01 | Stop reason: HOSPADM

## 2020-01-01 RX ORDER — HEPARIN SODIUM 5000 [USP'U]/ML
5000 INJECTION, SOLUTION INTRAVENOUS; SUBCUTANEOUS EVERY 8 HOURS SCHEDULED
Status: DISCONTINUED | OUTPATIENT
Start: 2020-01-01 | End: 2020-01-01 | Stop reason: HOSPADM

## 2020-01-01 RX ORDER — PRAVASTATIN SODIUM 80 MG/1
TABLET ORAL
Qty: 90 TABLET | Refills: 5 | Status: SHIPPED | OUTPATIENT
Start: 2020-01-01 | End: 2021-01-01 | Stop reason: HOSPADM

## 2020-01-01 RX ORDER — ALBUMIN, HUMAN INJ 5% 5 %
SOLUTION INTRAVENOUS CONTINUOUS PRN
Status: DISCONTINUED | OUTPATIENT
Start: 2020-01-01 | End: 2020-01-01 | Stop reason: SURG

## 2020-01-01 RX ORDER — POTASSIUM CHLORIDE 14.9 MG/ML
20 INJECTION INTRAVENOUS ONCE
Status: COMPLETED | OUTPATIENT
Start: 2020-01-01 | End: 2020-01-01

## 2020-01-01 RX ORDER — METOCLOPRAMIDE HYDROCHLORIDE 5 MG/ML
5 INJECTION INTRAMUSCULAR; INTRAVENOUS ONCE
Status: COMPLETED | OUTPATIENT
Start: 2020-01-01 | End: 2020-01-01

## 2020-01-01 RX ORDER — SODIUM CHLORIDE 9 MG/ML
50 INJECTION, SOLUTION INTRAVENOUS CONTINUOUS
Status: CANCELLED | OUTPATIENT
Start: 2020-01-01

## 2020-01-01 RX ORDER — HYDROMORPHONE HCL/PF 1 MG/ML
0.2 SYRINGE (ML) INJECTION EVERY 4 HOURS PRN
Status: DISCONTINUED | OUTPATIENT
Start: 2020-01-01 | End: 2020-01-01 | Stop reason: HOSPADM

## 2020-01-01 RX ORDER — CEPHALEXIN 500 MG/1
500 CAPSULE ORAL EVERY 12 HOURS SCHEDULED
Qty: 14 CAPSULE | Refills: 0 | Status: SHIPPED | OUTPATIENT
Start: 2020-01-01 | End: 2020-01-01

## 2020-01-01 RX ORDER — CEFAZOLIN SODIUM 1 G/3ML
INJECTION, POWDER, FOR SOLUTION INTRAMUSCULAR; INTRAVENOUS AS NEEDED
Status: DISCONTINUED | OUTPATIENT
Start: 2020-01-01 | End: 2020-01-01 | Stop reason: SURG

## 2020-01-01 RX ORDER — FUROSEMIDE 10 MG/ML
20 INJECTION INTRAMUSCULAR; INTRAVENOUS ONCE
Status: COMPLETED | OUTPATIENT
Start: 2020-01-01 | End: 2020-01-01

## 2020-01-01 RX ORDER — AMLODIPINE BESYLATE 10 MG/1
TABLET ORAL
Qty: 135 TABLET | Refills: 3 | Status: SHIPPED | OUTPATIENT
Start: 2020-01-01 | End: 2020-01-01 | Stop reason: HOSPADM

## 2020-01-01 RX ORDER — ACETAMINOPHEN 325 MG/1
650 TABLET ORAL ONCE
Status: COMPLETED | OUTPATIENT
Start: 2020-01-01 | End: 2020-01-01

## 2020-01-01 RX ORDER — SUCRALFATE 1 G/1
1 TABLET ORAL
Status: DISCONTINUED | OUTPATIENT
Start: 2020-01-01 | End: 2020-01-01

## 2020-01-01 RX ORDER — CLONIDINE HYDROCHLORIDE 0.1 MG/1
0.2 TABLET ORAL EVERY 8 HOURS SCHEDULED
Status: DISCONTINUED | OUTPATIENT
Start: 2020-01-01 | End: 2020-01-01 | Stop reason: HOSPADM

## 2020-01-01 RX ORDER — ONDANSETRON 2 MG/ML
INJECTION INTRAMUSCULAR; INTRAVENOUS AS NEEDED
Status: DISCONTINUED | OUTPATIENT
Start: 2020-01-01 | End: 2020-01-01 | Stop reason: SURG

## 2020-01-01 RX ORDER — BUSPIRONE HYDROCHLORIDE 5 MG/1
10 TABLET ORAL 2 TIMES DAILY
Status: DISCONTINUED | OUTPATIENT
Start: 2020-01-01 | End: 2020-01-01 | Stop reason: HOSPADM

## 2020-01-01 RX ORDER — SERTRALINE HYDROCHLORIDE 100 MG/1
200 TABLET, FILM COATED ORAL DAILY
Qty: 180 TABLET | Refills: 2 | Status: SHIPPED | OUTPATIENT
Start: 2020-01-01 | End: 2021-01-01 | Stop reason: SDUPTHER

## 2020-01-01 RX ORDER — INSULIN LISPRO 100 [IU]/ML
INJECTION, SOLUTION INTRAVENOUS; SUBCUTANEOUS
Qty: 5 PEN | Refills: 1 | Status: SHIPPED | OUTPATIENT
Start: 2020-01-01 | End: 2020-01-01 | Stop reason: SDUPTHER

## 2020-01-01 RX ORDER — SODIUM CHLORIDE 9 MG/ML
50 INJECTION, SOLUTION INTRAVENOUS CONTINUOUS
Status: DISCONTINUED | OUTPATIENT
Start: 2020-01-01 | End: 2020-01-01 | Stop reason: HOSPADM

## 2020-01-01 RX ORDER — SODIUM CHLORIDE, SODIUM GLUCONATE, SODIUM ACETATE, POTASSIUM CHLORIDE, MAGNESIUM CHLORIDE, SODIUM PHOSPHATE, DIBASIC, AND POTASSIUM PHOSPHATE .53; .5; .37; .037; .03; .012; .00082 G/100ML; G/100ML; G/100ML; G/100ML; G/100ML; G/100ML; G/100ML
500 INJECTION, SOLUTION INTRAVENOUS ONCE
Status: COMPLETED | OUTPATIENT
Start: 2020-01-01 | End: 2020-01-01

## 2020-01-01 RX ORDER — TACROLIMUS 1 MG/1
2 CAPSULE ORAL EVERY 12 HOURS SCHEDULED
Status: DISCONTINUED | OUTPATIENT
Start: 2020-01-01 | End: 2020-01-01 | Stop reason: HOSPADM

## 2020-01-01 RX ORDER — BUSPIRONE HYDROCHLORIDE 10 MG/1
10 TABLET ORAL 2 TIMES DAILY
Qty: 180 TABLET | Refills: 2 | Status: SHIPPED | OUTPATIENT
Start: 2020-01-01 | End: 2021-01-01 | Stop reason: SDUPTHER

## 2020-01-01 RX ORDER — POTASSIUM CHLORIDE AND SODIUM CHLORIDE 900; 300 MG/100ML; MG/100ML
60 INJECTION, SOLUTION INTRAVENOUS CONTINUOUS
Status: DISCONTINUED | OUTPATIENT
Start: 2020-01-01 | End: 2020-01-01

## 2020-01-01 RX ORDER — BUPIVACAINE HYDROCHLORIDE 5 MG/ML
INJECTION, SOLUTION PERINEURAL AS NEEDED
Status: DISCONTINUED | OUTPATIENT
Start: 2020-01-01 | End: 2020-01-01 | Stop reason: HOSPADM

## 2020-01-01 RX ORDER — AMLODIPINE BESYLATE 10 MG/1
10 TABLET ORAL
Status: DISCONTINUED | OUTPATIENT
Start: 2020-01-01 | End: 2020-01-01 | Stop reason: HOSPADM

## 2020-01-01 RX ORDER — AMLODIPINE BESYLATE 10 MG/1
10 TABLET ORAL
Qty: 30 TABLET | Refills: 0 | Status: SHIPPED | OUTPATIENT
Start: 2020-01-01 | End: 2020-01-01 | Stop reason: HOSPADM

## 2020-01-01 RX ORDER — TORSEMIDE 20 MG/1
20 TABLET ORAL DAILY
Status: DISCONTINUED | OUTPATIENT
Start: 2020-01-01 | End: 2020-01-01 | Stop reason: HOSPADM

## 2020-01-01 RX ORDER — DOXAZOSIN MESYLATE 4 MG/1
4 TABLET ORAL
Status: DISCONTINUED | OUTPATIENT
Start: 2020-01-01 | End: 2020-01-01 | Stop reason: HOSPADM

## 2020-01-01 RX ORDER — DEXTROSE MONOHYDRATE 50 MG/ML
50 INJECTION, SOLUTION INTRAVENOUS CONTINUOUS
Status: DISCONTINUED | OUTPATIENT
Start: 2020-01-01 | End: 2020-01-01

## 2020-01-01 RX ORDER — TACROLIMUS 1 MG/1
1 CAPSULE ORAL
Status: DISCONTINUED | OUTPATIENT
Start: 2020-01-01 | End: 2020-01-01 | Stop reason: HOSPADM

## 2020-01-01 RX ORDER — CEFAZOLIN SODIUM 2 G/50ML
2000 SOLUTION INTRAVENOUS ONCE
Status: DISCONTINUED | OUTPATIENT
Start: 2020-01-01 | End: 2020-01-01 | Stop reason: HOSPADM

## 2020-01-01 RX ORDER — FOLIC ACID 1 MG/1
1000 TABLET ORAL DAILY
Status: DISCONTINUED | OUTPATIENT
Start: 2020-01-01 | End: 2020-01-01 | Stop reason: HOSPADM

## 2020-01-01 RX ORDER — PRAVASTATIN SODIUM 80 MG/1
80 TABLET ORAL
Status: DISCONTINUED | OUTPATIENT
Start: 2020-01-01 | End: 2020-01-01 | Stop reason: HOSPADM

## 2020-01-01 RX ORDER — FENTANYL CITRATE 50 UG/ML
75 INJECTION, SOLUTION INTRAMUSCULAR; INTRAVENOUS ONCE
Status: COMPLETED | OUTPATIENT
Start: 2020-01-01 | End: 2020-01-01

## 2020-01-01 RX ORDER — ARIPIPRAZOLE 30 MG/1
30 TABLET ORAL
Qty: 90 TABLET | Refills: 2 | Status: SHIPPED | OUTPATIENT
Start: 2020-01-01 | End: 2021-01-01 | Stop reason: SDUPTHER

## 2020-01-01 RX ORDER — DEXTROSE MONOHYDRATE 50 MG/ML
30 INJECTION, SOLUTION INTRAVENOUS CONTINUOUS
Status: DISCONTINUED | OUTPATIENT
Start: 2020-01-01 | End: 2020-01-01

## 2020-01-01 RX ORDER — LEVOTHYROXINE SODIUM 0.12 MG/1
125 TABLET ORAL
Status: DISCONTINUED | OUTPATIENT
Start: 2020-01-01 | End: 2020-01-01 | Stop reason: HOSPADM

## 2020-01-01 RX ORDER — ONDANSETRON 2 MG/ML
INJECTION INTRAMUSCULAR; INTRAVENOUS CODE/TRAUMA/SEDATION MEDICATION
Status: COMPLETED | OUTPATIENT
Start: 2020-01-01 | End: 2020-01-01

## 2020-01-01 RX ORDER — DULOXETIN HYDROCHLORIDE 60 MG/1
60 CAPSULE, DELAYED RELEASE ORAL 2 TIMES DAILY
Status: DISCONTINUED | OUTPATIENT
Start: 2020-01-01 | End: 2020-01-01 | Stop reason: HOSPADM

## 2020-01-01 RX ORDER — HEPARIN SODIUM 5000 [USP'U]/ML
5000 INJECTION, SOLUTION INTRAVENOUS; SUBCUTANEOUS EVERY 8 HOURS SCHEDULED
Status: DISCONTINUED | OUTPATIENT
Start: 2020-01-01 | End: 2020-01-01

## 2020-01-01 RX ORDER — SODIUM CHLORIDE 9 MG/ML
20 INJECTION, SOLUTION INTRAVENOUS ONCE
Status: COMPLETED | OUTPATIENT
Start: 2020-01-01 | End: 2020-01-01

## 2020-01-01 RX ORDER — CEPHALEXIN 500 MG/1
500 CAPSULE ORAL EVERY 12 HOURS SCHEDULED
Qty: 8 CAPSULE | Refills: 0 | Status: SHIPPED | OUTPATIENT
Start: 2020-01-01 | End: 2020-01-01

## 2020-01-01 RX ORDER — ONDANSETRON 2 MG/ML
4 INJECTION INTRAMUSCULAR; INTRAVENOUS EVERY 6 HOURS PRN
Status: DISCONTINUED | OUTPATIENT
Start: 2020-01-01 | End: 2020-01-01 | Stop reason: HOSPADM

## 2020-01-01 RX ORDER — HYDRALAZINE HYDROCHLORIDE 50 MG/1
100 TABLET, FILM COATED ORAL EVERY 8 HOURS SCHEDULED
Status: DISCONTINUED | OUTPATIENT
Start: 2020-01-01 | End: 2020-01-01 | Stop reason: HOSPADM

## 2020-01-01 RX ORDER — HYDROMORPHONE HCL/PF 1 MG/ML
0.2 SYRINGE (ML) INJECTION ONCE
Status: COMPLETED | OUTPATIENT
Start: 2020-01-01 | End: 2020-01-01

## 2020-01-01 RX ORDER — SODIUM CHLORIDE 9 MG/ML
50 INJECTION, SOLUTION INTRAVENOUS CONTINUOUS
Status: DISCONTINUED | OUTPATIENT
Start: 2020-01-01 | End: 2020-01-01

## 2020-01-01 RX ORDER — METOPROLOL TARTRATE 50 MG/1
50 TABLET, FILM COATED ORAL 2 TIMES DAILY
Status: DISCONTINUED | OUTPATIENT
Start: 2020-01-01 | End: 2020-01-01 | Stop reason: HOSPADM

## 2020-01-01 RX ORDER — PROPOFOL 10 MG/ML
INJECTION, EMULSION INTRAVENOUS AS NEEDED
Status: DISCONTINUED | OUTPATIENT
Start: 2020-01-01 | End: 2020-01-01 | Stop reason: SURG

## 2020-01-01 RX ORDER — INSULIN GLARGINE 100 [IU]/ML
5 INJECTION, SOLUTION SUBCUTANEOUS
Status: DISCONTINUED | OUTPATIENT
Start: 2020-01-01 | End: 2020-01-01 | Stop reason: HOSPADM

## 2020-01-01 RX ORDER — LIDOCAINE HYDROCHLORIDE 10 MG/ML
0.5 INJECTION, SOLUTION EPIDURAL; INFILTRATION; INTRACAUDAL; PERINEURAL ONCE AS NEEDED
Status: COMPLETED | OUTPATIENT
Start: 2020-01-01 | End: 2020-01-01

## 2020-01-01 RX ORDER — OXYCODONE HYDROCHLORIDE AND ACETAMINOPHEN 5; 325 MG/1; MG/1
1 TABLET ORAL EVERY 6 HOURS PRN
Qty: 15 TABLET | Refills: 0 | Status: SHIPPED | OUTPATIENT
Start: 2020-01-01 | End: 2020-01-01

## 2020-01-01 RX ORDER — PANTOPRAZOLE SODIUM 40 MG/1
40 TABLET, DELAYED RELEASE ORAL
Status: DISCONTINUED | OUTPATIENT
Start: 2020-01-01 | End: 2020-01-01

## 2020-01-01 RX ORDER — HYDRALAZINE HYDROCHLORIDE 20 MG/ML
10 INJECTION INTRAMUSCULAR; INTRAVENOUS EVERY 6 HOURS PRN
Status: DISCONTINUED | OUTPATIENT
Start: 2020-01-01 | End: 2020-01-01 | Stop reason: HOSPADM

## 2020-01-01 RX ORDER — TACROLIMUS 1 MG/1
2 CAPSULE ORAL EVERY 12 HOURS
Status: DISCONTINUED | OUTPATIENT
Start: 2020-01-01 | End: 2020-01-01 | Stop reason: SDUPTHER

## 2020-01-01 RX ORDER — MIDODRINE HYDROCHLORIDE 5 MG/1
5 TABLET ORAL 3 TIMES DAILY
COMMUNITY
End: 2021-01-01

## 2020-01-01 RX ORDER — INSULIN LISPRO 100 [IU]/ML
INJECTION, SOLUTION INTRAVENOUS; SUBCUTANEOUS
Qty: 15 PEN | Refills: 1 | Status: SHIPPED | OUTPATIENT
Start: 2020-01-01 | End: 2021-01-01 | Stop reason: HOSPADM

## 2020-01-01 RX ORDER — TACROLIMUS 1 MG/1
CAPSULE ORAL
Qty: 120 CAPSULE | Refills: 0 | Status: SHIPPED | OUTPATIENT
Start: 2020-01-01 | End: 2020-01-01

## 2020-01-01 RX ORDER — SODIUM BICARBONATE 650 MG/1
1950 TABLET ORAL 3 TIMES DAILY
Status: DISCONTINUED | OUTPATIENT
Start: 2020-01-01 | End: 2020-01-01 | Stop reason: HOSPADM

## 2020-01-01 RX ORDER — PANTOPRAZOLE SODIUM 40 MG/1
40 TABLET, DELAYED RELEASE ORAL
Qty: 60 TABLET | Refills: 1 | Status: SHIPPED | OUTPATIENT
Start: 2020-01-01 | End: 2021-01-01 | Stop reason: HOSPADM

## 2020-01-01 RX ORDER — MIDAZOLAM HYDROCHLORIDE 2 MG/2ML
INJECTION, SOLUTION INTRAMUSCULAR; INTRAVENOUS CODE/TRAUMA/SEDATION MEDICATION
Status: COMPLETED | OUTPATIENT
Start: 2020-01-01 | End: 2020-01-01

## 2020-01-01 RX ORDER — ONDANSETRON 4 MG/1
4 TABLET, FILM COATED ORAL EVERY 8 HOURS PRN
Qty: 30 TABLET | Refills: 3 | Status: SHIPPED | OUTPATIENT
Start: 2020-01-01 | End: 2021-01-01

## 2020-01-01 RX ORDER — OXYCODONE HYDROCHLORIDE AND ACETAMINOPHEN 5; 325 MG/1; MG/1
1 TABLET ORAL EVERY 6 HOURS PRN
Qty: 15 TABLET | Refills: 0 | Status: SHIPPED | OUTPATIENT
Start: 2020-01-01 | End: 2020-01-01 | Stop reason: HOSPADM

## 2020-01-01 RX ORDER — FENTANYL CITRATE 50 UG/ML
INJECTION, SOLUTION INTRAMUSCULAR; INTRAVENOUS AS NEEDED
Status: DISCONTINUED | OUTPATIENT
Start: 2020-01-01 | End: 2020-01-01 | Stop reason: SURG

## 2020-01-01 RX ORDER — CEFAZOLIN SODIUM 1 G/50ML
1000 SOLUTION INTRAVENOUS EVERY 12 HOURS
Status: DISCONTINUED | OUTPATIENT
Start: 2020-01-01 | End: 2020-01-01

## 2020-01-01 RX ORDER — NEOSTIGMINE METHYLSULFATE 1 MG/ML
INJECTION INTRAVENOUS AS NEEDED
Status: DISCONTINUED | OUTPATIENT
Start: 2020-01-01 | End: 2020-01-01 | Stop reason: SURG

## 2020-01-01 RX ORDER — FENTANYL CITRATE 50 UG/ML
INJECTION, SOLUTION INTRAMUSCULAR; INTRAVENOUS AS NEEDED
Status: DISCONTINUED | OUTPATIENT
Start: 2020-01-01 | End: 2020-01-01

## 2020-01-01 RX ORDER — LIDOCAINE WITH 8.4% SOD BICARB 0.9%(10ML)
SYRINGE (ML) INJECTION CODE/TRAUMA/SEDATION MEDICATION
Status: COMPLETED | OUTPATIENT
Start: 2020-01-01 | End: 2020-01-01

## 2020-01-01 RX ORDER — TACROLIMUS 1 MG/1
2 CAPSULE ORAL DAILY
Status: DISCONTINUED | OUTPATIENT
Start: 2020-01-01 | End: 2020-01-01 | Stop reason: HOSPADM

## 2020-01-01 RX ORDER — DULOXETIN HYDROCHLORIDE 60 MG/1
60 CAPSULE, DELAYED RELEASE ORAL 2 TIMES DAILY
Qty: 180 CAPSULE | Refills: 2 | Status: SHIPPED | OUTPATIENT
Start: 2020-01-01 | End: 2021-01-01 | Stop reason: SDUPTHER

## 2020-01-01 RX ORDER — TACROLIMUS 1 MG/1
CAPSULE ORAL
Qty: 120 CAPSULE | Refills: 0 | Status: SHIPPED | OUTPATIENT
Start: 2020-01-01 | End: 2021-01-01

## 2020-01-01 RX ORDER — OXYCODONE HYDROCHLORIDE AND ACETAMINOPHEN 5; 325 MG/1; MG/1
1 TABLET ORAL EVERY 4 HOURS PRN
Status: DISCONTINUED | OUTPATIENT
Start: 2020-01-01 | End: 2020-01-01 | Stop reason: HOSPADM

## 2020-01-01 RX ORDER — SERTRALINE HYDROCHLORIDE 100 MG/1
200 TABLET, FILM COATED ORAL DAILY
Status: DISCONTINUED | OUTPATIENT
Start: 2020-01-01 | End: 2020-01-01 | Stop reason: HOSPADM

## 2020-01-01 RX ORDER — METOPROLOL TARTRATE 50 MG/1
TABLET, FILM COATED ORAL
Qty: 180 TABLET | Refills: 0 | OUTPATIENT
Start: 2020-01-01

## 2020-01-01 RX ORDER — GLYCOPYRROLATE 0.2 MG/ML
INJECTION INTRAMUSCULAR; INTRAVENOUS AS NEEDED
Status: DISCONTINUED | OUTPATIENT
Start: 2020-01-01 | End: 2020-01-01 | Stop reason: SURG

## 2020-01-01 RX ORDER — MELATONIN
3000 DAILY
Status: DISCONTINUED | OUTPATIENT
Start: 2020-01-01 | End: 2020-01-01 | Stop reason: HOSPADM

## 2020-01-01 RX ORDER — ARIPIPRAZOLE 10 MG/1
30 TABLET ORAL
Status: DISCONTINUED | OUTPATIENT
Start: 2020-01-01 | End: 2020-01-01 | Stop reason: HOSPADM

## 2020-01-01 RX ORDER — PANTOPRAZOLE SODIUM 40 MG/1
40 TABLET, DELAYED RELEASE ORAL
Status: DISCONTINUED | OUTPATIENT
Start: 2020-01-01 | End: 2020-01-01 | Stop reason: HOSPADM

## 2020-01-01 RX ORDER — DOXERCALCIFEROL 2 UG/ML
1 INJECTION, SOLUTION INTRAVENOUS
Status: DISCONTINUED | OUTPATIENT
Start: 2020-01-01 | End: 2020-01-01 | Stop reason: HOSPADM

## 2020-01-01 RX ORDER — HYDRALAZINE HYDROCHLORIDE 25 MG/1
100 TABLET, FILM COATED ORAL 3 TIMES DAILY
Status: DISCONTINUED | OUTPATIENT
Start: 2020-01-01 | End: 2020-01-01 | Stop reason: HOSPADM

## 2020-01-01 RX ORDER — CEFAZOLIN SODIUM 2 G/50ML
2000 SOLUTION INTRAVENOUS ONCE
Status: COMPLETED | OUTPATIENT
Start: 2020-01-01 | End: 2020-01-01

## 2020-01-01 RX ORDER — LORAZEPAM 1 MG/1
2 TABLET ORAL 3 TIMES DAILY PRN
Status: DISCONTINUED | OUTPATIENT
Start: 2020-01-01 | End: 2020-01-01 | Stop reason: HOSPADM

## 2020-01-01 RX ORDER — SEVELAMER HYDROCHLORIDE 800 MG/1
1600 TABLET, FILM COATED ORAL
Status: DISCONTINUED | OUTPATIENT
Start: 2020-01-01 | End: 2020-01-01 | Stop reason: HOSPADM

## 2020-01-01 RX ORDER — DOXAZOSIN 2 MG/1
4 TABLET ORAL
Qty: 180 TABLET | Refills: 3 | Status: SHIPPED | OUTPATIENT
Start: 2020-01-01 | End: 2021-01-01

## 2020-01-01 RX ORDER — SODIUM CHLORIDE 9 MG/ML
20 INJECTION, SOLUTION INTRAVENOUS ONCE
Status: DISCONTINUED | OUTPATIENT
Start: 2020-01-01 | End: 2020-01-01 | Stop reason: HOSPADM

## 2020-01-01 RX ORDER — ASPIRIN 81 MG/1
81 TABLET, CHEWABLE ORAL DAILY
Status: DISCONTINUED | OUTPATIENT
Start: 2020-01-01 | End: 2020-01-01 | Stop reason: HOSPADM

## 2020-01-01 RX ORDER — METOCLOPRAMIDE HYDROCHLORIDE 5 MG/ML
10 INJECTION INTRAMUSCULAR; INTRAVENOUS ONCE
Status: COMPLETED | OUTPATIENT
Start: 2020-01-01 | End: 2020-01-01

## 2020-01-01 RX ORDER — SEVELAMER HYDROCHLORIDE 800 MG/1
800 TABLET, FILM COATED ORAL
Status: DISCONTINUED | OUTPATIENT
Start: 2020-01-01 | End: 2020-01-01 | Stop reason: HOSPADM

## 2020-01-01 RX ORDER — BUSPIRONE HYDROCHLORIDE 10 MG/1
10 TABLET ORAL 2 TIMES DAILY
Status: DISCONTINUED | OUTPATIENT
Start: 2020-01-01 | End: 2020-01-01 | Stop reason: HOSPADM

## 2020-01-01 RX ORDER — CEFAZOLIN SODIUM 2 G/50ML
2000 SOLUTION INTRAVENOUS ONCE
Status: CANCELLED | OUTPATIENT
Start: 2020-01-01 | End: 2020-01-01

## 2020-01-01 RX ORDER — METRONIDAZOLE 500 MG/1
500 TABLET ORAL EVERY 8 HOURS SCHEDULED
Qty: 12 TABLET | Refills: 0 | Status: SHIPPED | OUTPATIENT
Start: 2020-01-01 | End: 2020-01-01

## 2020-01-01 RX ORDER — LORAZEPAM 2 MG/1
2 TABLET ORAL 3 TIMES DAILY PRN
Qty: 90 TABLET | Refills: 1 | Status: SHIPPED | OUTPATIENT
Start: 2020-01-01 | End: 2021-01-01 | Stop reason: SDUPTHER

## 2020-01-01 RX ORDER — FENTANYL CITRATE 50 UG/ML
INJECTION, SOLUTION INTRAMUSCULAR; INTRAVENOUS CODE/TRAUMA/SEDATION MEDICATION
Status: COMPLETED | OUTPATIENT
Start: 2020-01-01 | End: 2020-01-01

## 2020-01-01 RX ORDER — TACROLIMUS 1 MG/1
2 CAPSULE ORAL EVERY 12 HOURS SCHEDULED
Status: DISCONTINUED | OUTPATIENT
Start: 2020-01-01 | End: 2020-01-01

## 2020-01-01 RX ORDER — CHLORHEXIDINE GLUCONATE 0.12 MG/ML
15 RINSE ORAL ONCE
Status: CANCELLED | OUTPATIENT
Start: 2020-01-01 | End: 2020-01-01

## 2020-01-01 RX ORDER — METHYLPREDNISOLONE SODIUM SUCCINATE 40 MG/ML
4 INJECTION, POWDER, LYOPHILIZED, FOR SOLUTION INTRAMUSCULAR; INTRAVENOUS ONCE
Status: COMPLETED | OUTPATIENT
Start: 2020-01-01 | End: 2020-01-01

## 2020-01-01 RX ORDER — METOCLOPRAMIDE HYDROCHLORIDE 5 MG/ML
10 INJECTION INTRAMUSCULAR; INTRAVENOUS ONCE AS NEEDED
Status: DISCONTINUED | OUTPATIENT
Start: 2020-01-01 | End: 2020-01-01 | Stop reason: HOSPADM

## 2020-01-01 RX ADMIN — SERTRALINE HYDROCHLORIDE 200 MG: 50 TABLET ORAL at 12:26

## 2020-01-01 RX ADMIN — PROPOFOL 100 MG: 10 INJECTION, EMULSION INTRAVENOUS at 11:26

## 2020-01-01 RX ADMIN — TACROLIMUS 2 MG: 1 CAPSULE ORAL at 08:08

## 2020-01-01 RX ADMIN — PRAVASTATIN SODIUM 80 MG: 80 TABLET ORAL at 17:41

## 2020-01-01 RX ADMIN — SODIUM BICARBONATE 650 MG TABLET 1300 MG: at 17:22

## 2020-01-01 RX ADMIN — LIDOCAINE HYDROCHLORIDE 10 ML: 20 SOLUTION ORAL; TOPICAL at 23:00

## 2020-01-01 RX ADMIN — HEPARIN SODIUM 5000 UNITS: 5000 INJECTION INTRAVENOUS; SUBCUTANEOUS at 13:56

## 2020-01-01 RX ADMIN — SEVELAMER HYDROCHLORIDE 1600 MG: 800 TABLET, FILM COATED PARENTERAL at 12:50

## 2020-01-01 RX ADMIN — ROPINIROLE HYDROCHLORIDE 0.25 MG: 0.25 TABLET, FILM COATED ORAL at 17:25

## 2020-01-01 RX ADMIN — AMLODIPINE BESYLATE 10 MG: 10 TABLET ORAL at 21:28

## 2020-01-01 RX ADMIN — ONDANSETRON 4 MG: 2 INJECTION INTRAMUSCULAR; INTRAVENOUS at 10:15

## 2020-01-01 RX ADMIN — MIDAZOLAM HYDROCHLORIDE 0.5 MG: 1 INJECTION, SOLUTION INTRAMUSCULAR; INTRAVENOUS at 09:13

## 2020-01-01 RX ADMIN — CEFTRIAXONE SODIUM 1000 MG: 10 INJECTION, POWDER, FOR SOLUTION INTRAVENOUS at 08:03

## 2020-01-01 RX ADMIN — LEVOTHYROXINE SODIUM 125 MCG: 125 TABLET ORAL at 05:40

## 2020-01-01 RX ADMIN — BUSPIRONE HYDROCHLORIDE 10 MG: 10 TABLET ORAL at 17:13

## 2020-01-01 RX ADMIN — BUSPIRONE HYDROCHLORIDE 10 MG: 10 TABLET ORAL at 17:25

## 2020-01-01 RX ADMIN — ONDANSETRON 4 MG: 2 INJECTION INTRAMUSCULAR; INTRAVENOUS at 12:02

## 2020-01-01 RX ADMIN — ARIPIPRAZOLE 30 MG: 15 TABLET ORAL at 21:48

## 2020-01-01 RX ADMIN — METRONIDAZOLE 500 MG: 500 INJECTION, SOLUTION INTRAVENOUS at 05:17

## 2020-01-01 RX ADMIN — HEPARIN SODIUM 5000 UNITS: 5000 INJECTION INTRAVENOUS; SUBCUTANEOUS at 14:16

## 2020-01-01 RX ADMIN — CLONIDINE HYDROCHLORIDE 0.2 MG: 0.1 TABLET ORAL at 05:34

## 2020-01-01 RX ADMIN — SEVELAMER HYDROCHLORIDE 800 MG: 800 TABLET, FILM COATED PARENTERAL at 17:22

## 2020-01-01 RX ADMIN — PRAVASTATIN SODIUM 80 MG: 80 TABLET ORAL at 12:26

## 2020-01-01 RX ADMIN — DEXTROSE 50 ML/HR: 5 SOLUTION INTRAVENOUS at 17:53

## 2020-01-01 RX ADMIN — LEVOTHYROXINE SODIUM 125 MCG: 125 TABLET ORAL at 05:14

## 2020-01-01 RX ADMIN — SEVELAMER HYDROCHLORIDE 1600 MG: 800 TABLET, FILM COATED PARENTERAL at 08:47

## 2020-01-01 RX ADMIN — SERTRALINE HYDROCHLORIDE 200 MG: 50 TABLET ORAL at 08:08

## 2020-01-01 RX ADMIN — SODIUM CHLORIDE, SODIUM GLUCONATE, SODIUM ACETATE, POTASSIUM CHLORIDE, MAGNESIUM CHLORIDE, SODIUM PHOSPHATE, DIBASIC, AND POTASSIUM PHOSPHATE 500 ML: .53; .5; .37; .037; .03; .012; .00082 INJECTION, SOLUTION INTRAVENOUS at 15:13

## 2020-01-01 RX ADMIN — HYDRALAZINE HYDROCHLORIDE 100 MG: 25 TABLET ORAL at 17:40

## 2020-01-01 RX ADMIN — SEVELAMER HYDROCHLORIDE 1600 MG: 800 TABLET, FILM COATED PARENTERAL at 12:21

## 2020-01-01 RX ADMIN — METRONIDAZOLE 500 MG: 500 INJECTION, SOLUTION INTRAVENOUS at 12:15

## 2020-01-01 RX ADMIN — ROPINIROLE HYDROCHLORIDE 0.25 MG: 0.25 TABLET, FILM COATED ORAL at 12:26

## 2020-01-01 RX ADMIN — ONDANSETRON 4 MG: 2 INJECTION INTRAMUSCULAR; INTRAVENOUS at 15:07

## 2020-01-01 RX ADMIN — SODIUM BICARBONATE 650 MG TABLET 1950 MG: at 08:41

## 2020-01-01 RX ADMIN — HEPARIN SODIUM 5000 UNITS: 5000 INJECTION INTRAVENOUS; SUBCUTANEOUS at 05:14

## 2020-01-01 RX ADMIN — ONDANSETRON 4 MG: 2 INJECTION INTRAMUSCULAR; INTRAVENOUS at 06:59

## 2020-01-01 RX ADMIN — METOPROLOL TARTRATE 50 MG: 50 TABLET, FILM COATED ORAL at 08:00

## 2020-01-01 RX ADMIN — SEVELAMER HYDROCHLORIDE 800 MG: 800 TABLET, FILM COATED PARENTERAL at 11:56

## 2020-01-01 RX ADMIN — SERTRALINE HYDROCHLORIDE 200 MG: 50 TABLET ORAL at 08:32

## 2020-01-01 RX ADMIN — DOXERCALCIFEROL 1 MCG: 4 INJECTION, SOLUTION INTRAVENOUS at 11:32

## 2020-01-01 RX ADMIN — SODIUM BICARBONATE 650 MG TABLET 1300 MG: at 08:31

## 2020-01-01 RX ADMIN — HYDROMORPHONE HYDROCHLORIDE 0.5 MG: 1 INJECTION, SOLUTION INTRAMUSCULAR; INTRAVENOUS; SUBCUTANEOUS at 11:56

## 2020-01-01 RX ADMIN — ROPINIROLE HYDROCHLORIDE 0.25 MG: 0.25 TABLET, FILM COATED ORAL at 17:22

## 2020-01-01 RX ADMIN — ONDANSETRON 4 MG: 2 INJECTION INTRAMUSCULAR; INTRAVENOUS at 03:41

## 2020-01-01 RX ADMIN — ONDANSETRON 4 MG: 2 INJECTION INTRAMUSCULAR; INTRAVENOUS at 10:58

## 2020-01-01 RX ADMIN — HEPARIN SODIUM 5000 UNITS: 5000 INJECTION INTRAVENOUS; SUBCUTANEOUS at 05:46

## 2020-01-01 RX ADMIN — FOLIC ACID 1000 MCG: 1 TABLET ORAL at 13:50

## 2020-01-01 RX ADMIN — ONDANSETRON 4 MG: 2 INJECTION INTRAMUSCULAR; INTRAVENOUS at 13:03

## 2020-01-01 RX ADMIN — CEFAZOLIN SODIUM 1000 MG: 1 SOLUTION INTRAVENOUS at 21:38

## 2020-01-01 RX ADMIN — LORAZEPAM 2 MG: 1 TABLET ORAL at 21:28

## 2020-01-01 RX ADMIN — SODIUM BICARBONATE 650 MG TABLET 1300 MG: at 12:51

## 2020-01-01 RX ADMIN — FENTANYL CITRATE 25 MCG: 50 INJECTION INTRAMUSCULAR; INTRAVENOUS at 08:43

## 2020-01-01 RX ADMIN — LENALIDOMIDE 2.5 MG: 2.5 CAPSULE ORAL at 17:41

## 2020-01-01 RX ADMIN — HEPARIN SODIUM 5000 UNITS: 5000 INJECTION INTRAVENOUS; SUBCUTANEOUS at 22:06

## 2020-01-01 RX ADMIN — ROPINIROLE 0.25 MG: 0.25 TABLET, FILM COATED ORAL at 17:12

## 2020-01-01 RX ADMIN — ROPINIROLE HYDROCHLORIDE 0.25 MG: 0.25 TABLET, FILM COATED ORAL at 08:01

## 2020-01-01 RX ADMIN — ROPINIROLE HYDROCHLORIDE 0.25 MG: 0.25 TABLET, FILM COATED ORAL at 08:08

## 2020-01-01 RX ADMIN — BUSPIRONE HYDROCHLORIDE 10 MG: 10 TABLET ORAL at 08:08

## 2020-01-01 RX ADMIN — CEFEPIME HYDROCHLORIDE 1000 MG: 1 INJECTION, POWDER, FOR SOLUTION INTRAMUSCULAR; INTRAVENOUS at 13:56

## 2020-01-01 RX ADMIN — BUSPIRONE HYDROCHLORIDE 10 MG: 10 TABLET ORAL at 12:26

## 2020-01-01 RX ADMIN — ASPIRIN 81 MG: 81 TABLET, COATED ORAL at 12:26

## 2020-01-01 RX ADMIN — HEPARIN SODIUM 5000 UNITS: 5000 INJECTION INTRAVENOUS; SUBCUTANEOUS at 21:07

## 2020-01-01 RX ADMIN — SODIUM CHLORIDE 50 ML/HR: 0.9 INJECTION, SOLUTION INTRAVENOUS at 13:02

## 2020-01-01 RX ADMIN — ROPINIROLE HYDROCHLORIDE 0.25 MG: 0.25 TABLET, FILM COATED ORAL at 17:41

## 2020-01-01 RX ADMIN — PANTOPRAZOLE SODIUM 40 MG: 40 INJECTION, POWDER, FOR SOLUTION INTRAVENOUS at 21:08

## 2020-01-01 RX ADMIN — SEVELAMER HYDROCHLORIDE 1600 MG: 800 TABLET, FILM COATED PARENTERAL at 14:07

## 2020-01-01 RX ADMIN — FENTANYL CITRATE 25 MCG: 50 INJECTION INTRAMUSCULAR; INTRAVENOUS at 09:21

## 2020-01-01 RX ADMIN — FOLIC ACID 1000 MCG: 1 TABLET ORAL at 10:42

## 2020-01-01 RX ADMIN — MELATONIN 3000 UNITS: at 10:41

## 2020-01-01 RX ADMIN — SODIUM BICARBONATE 650 MG TABLET 1300 MG: at 13:49

## 2020-01-01 RX ADMIN — MIDAZOLAM 2 MG: 1 INJECTION INTRAMUSCULAR; INTRAVENOUS at 08:59

## 2020-01-01 RX ADMIN — ROPINIROLE 0.25 MG: 0.25 TABLET, FILM COATED ORAL at 10:40

## 2020-01-01 RX ADMIN — SODIUM CHLORIDE 20 ML/HR: 0.9 INJECTION, SOLUTION INTRAVENOUS at 09:00

## 2020-01-01 RX ADMIN — CLONIDINE HYDROCHLORIDE 0.2 MG: 0.1 TABLET ORAL at 13:56

## 2020-01-01 RX ADMIN — DEXTROSE 50 ML/HR: 5 SOLUTION INTRAVENOUS at 13:30

## 2020-01-01 RX ADMIN — FERROUS SULFATE TAB 325 MG (65 MG ELEMENTAL FE) 325 MG: 325 (65 FE) TAB at 12:50

## 2020-01-01 RX ADMIN — BUSPIRONE HYDROCHLORIDE 10 MG: 10 TABLET ORAL at 17:14

## 2020-01-01 RX ADMIN — CLONIDINE HYDROCHLORIDE 0.2 MG: 0.1 TABLET ORAL at 16:13

## 2020-01-01 RX ADMIN — LORAZEPAM 2 MG: 1 TABLET ORAL at 21:30

## 2020-01-01 RX ADMIN — SODIUM BICARBONATE 650 MG TABLET 1300 MG: at 17:13

## 2020-01-01 RX ADMIN — DARBEPOETIN ALFA 200 MCG: 200 INJECTION, SOLUTION INTRAVENOUS; SUBCUTANEOUS at 15:06

## 2020-01-01 RX ADMIN — DULOXETINE HYDROCHLORIDE 60 MG: 60 CAPSULE, DELAYED RELEASE ORAL at 17:54

## 2020-01-01 RX ADMIN — ROPINIROLE HYDROCHLORIDE 0.25 MG: 0.25 TABLET, FILM COATED ORAL at 17:30

## 2020-01-01 RX ADMIN — FOLIC ACID 1000 MCG: 1 TABLET ORAL at 08:06

## 2020-01-01 RX ADMIN — OXYCODONE HYDROCHLORIDE 2.5 MG: 5 TABLET ORAL at 20:52

## 2020-01-01 RX ADMIN — SODIUM CHLORIDE 2 MCG/MIN: 0.9 INJECTION, SOLUTION INTRAVENOUS at 09:44

## 2020-01-01 RX ADMIN — ONDANSETRON 4 MG: 2 INJECTION INTRAMUSCULAR; INTRAVENOUS at 08:54

## 2020-01-01 RX ADMIN — SEVELAMER HYDROCHLORIDE 1600 MG: 800 TABLET, FILM COATED PARENTERAL at 18:19

## 2020-01-01 RX ADMIN — CLONIDINE HYDROCHLORIDE 0.2 MG: 0.1 TABLET ORAL at 21:28

## 2020-01-01 RX ADMIN — LENALIDOMIDE 2.5 MG: 2.5 CAPSULE ORAL at 12:26

## 2020-01-01 RX ADMIN — PANTOPRAZOLE SODIUM 40 MG: 40 TABLET, DELAYED RELEASE ORAL at 21:44

## 2020-01-01 RX ADMIN — ASPIRIN 81 MG: 81 TABLET, COATED ORAL at 08:33

## 2020-01-01 RX ADMIN — FENTANYL CITRATE 25 MCG: 50 INJECTION INTRAMUSCULAR; INTRAVENOUS at 09:14

## 2020-01-01 RX ADMIN — ASPIRIN 81 MG 81 MG: 81 TABLET ORAL at 08:00

## 2020-01-01 RX ADMIN — LENALIDOMIDE 2.5 MG: 2.5 CAPSULE ORAL at 13:50

## 2020-01-01 RX ADMIN — SEVELAMER HYDROCHLORIDE 800 MG: 800 TABLET, FILM COATED PARENTERAL at 11:46

## 2020-01-01 RX ADMIN — ARIPIPRAZOLE 30 MG: 10 TABLET ORAL at 21:29

## 2020-01-01 RX ADMIN — BUSPIRONE HYDROCHLORIDE 10 MG: 5 TABLET ORAL at 17:41

## 2020-01-01 RX ADMIN — METOCLOPRAMIDE 5 MG: 5 INJECTION, SOLUTION INTRAMUSCULAR; INTRAVENOUS at 10:15

## 2020-01-01 RX ADMIN — LEVOTHYROXINE SODIUM 125 MCG: 125 TABLET ORAL at 05:34

## 2020-01-01 RX ADMIN — SODIUM BICARBONATE 650 MG TABLET 1300 MG: at 17:14

## 2020-01-01 RX ADMIN — ONDANSETRON 4 MG: 2 INJECTION INTRAMUSCULAR; INTRAVENOUS at 05:33

## 2020-01-01 RX ADMIN — ROPINIROLE HYDROCHLORIDE 0.25 MG: 0.25 TABLET, FILM COATED ORAL at 17:14

## 2020-01-01 RX ADMIN — ROPINIROLE HYDROCHLORIDE 0.25 MG: 0.25 TABLET, FILM COATED ORAL at 17:40

## 2020-01-01 RX ADMIN — SEVELAMER HYDROCHLORIDE 800 MG: 800 TABLET, FILM COATED PARENTERAL at 17:29

## 2020-01-01 RX ADMIN — LEVOTHYROXINE SODIUM 125 MCG: 125 TABLET ORAL at 05:46

## 2020-01-01 RX ADMIN — PREDNISONE 5 MG: 5 TABLET ORAL at 08:06

## 2020-01-01 RX ADMIN — LENALIDOMIDE 2.5 MG: 2.5 CAPSULE ORAL at 08:48

## 2020-01-01 RX ADMIN — ACETAMINOPHEN 650 MG: 325 TABLET, FILM COATED ORAL at 15:39

## 2020-01-01 RX ADMIN — ALBUMIN (HUMAN): 12.5 INJECTION, SOLUTION INTRAVENOUS at 09:40

## 2020-01-01 RX ADMIN — LENALIDOMIDE 2.5 MG: 2.5 CAPSULE ORAL at 08:33

## 2020-01-01 RX ADMIN — SODIUM CHLORIDE 20 ML/HR: 0.9 INJECTION, SOLUTION INTRAVENOUS at 14:01

## 2020-01-01 RX ADMIN — HYDRALAZINE HYDROCHLORIDE 100 MG: 25 TABLET ORAL at 22:10

## 2020-01-01 RX ADMIN — PREDNISONE 5 MG: 5 TABLET ORAL at 08:02

## 2020-01-01 RX ADMIN — SODIUM BICARBONATE 650 MG TABLET 1300 MG: at 08:08

## 2020-01-01 RX ADMIN — SERTRALINE HYDROCHLORIDE 200 MG: 100 TABLET ORAL at 08:43

## 2020-01-01 RX ADMIN — PANTOPRAZOLE SODIUM 40 MG: 40 INJECTION, POWDER, FOR SOLUTION INTRAVENOUS at 22:06

## 2020-01-01 RX ADMIN — TORSEMIDE 20 MG: 20 TABLET ORAL at 10:42

## 2020-01-01 RX ADMIN — HYDRALAZINE HYDROCHLORIDE 50 MG: 50 TABLET, FILM COATED ORAL at 14:36

## 2020-01-01 RX ADMIN — SEVELAMER HYDROCHLORIDE 800 MG: 800 TABLET, FILM COATED PARENTERAL at 13:23

## 2020-01-01 RX ADMIN — INSULIN GLARGINE 5 UNITS: 100 INJECTION, SOLUTION SUBCUTANEOUS at 21:26

## 2020-01-01 RX ADMIN — FOLIC ACID 1000 MCG: 1 TABLET ORAL at 10:44

## 2020-01-01 RX ADMIN — ARIPIPRAZOLE 30 MG: 15 TABLET ORAL at 21:08

## 2020-01-01 RX ADMIN — ARIPIPRAZOLE 30 MG: 15 TABLET ORAL at 20:55

## 2020-01-01 RX ADMIN — TACROLIMUS 2 MG: 1 CAPSULE ORAL at 08:00

## 2020-01-01 RX ADMIN — SODIUM BICARBONATE 650 MG TABLET 1300 MG: at 17:29

## 2020-01-01 RX ADMIN — PREDNISONE 5 MG: 5 TABLET ORAL at 15:32

## 2020-01-01 RX ADMIN — ACETAMINOPHEN 650 MG: 325 TABLET, FILM COATED ORAL at 07:03

## 2020-01-01 RX ADMIN — ONDANSETRON 4 MG: 2 INJECTION INTRAMUSCULAR; INTRAVENOUS at 18:24

## 2020-01-01 RX ADMIN — PREDNISONE 5 MG: 5 TABLET ORAL at 08:50

## 2020-01-01 RX ADMIN — ROPINIROLE HYDROCHLORIDE 0.25 MG: 0.25 TABLET, FILM COATED ORAL at 08:44

## 2020-01-01 RX ADMIN — ROCURONIUM BROMIDE 15 MG: 50 INJECTION, SOLUTION INTRAVENOUS at 10:44

## 2020-01-01 RX ADMIN — SERTRALINE HYDROCHLORIDE 200 MG: 100 TABLET ORAL at 10:40

## 2020-01-01 RX ADMIN — CLONIDINE HYDROCHLORIDE 0.2 MG: 0.1 TABLET ORAL at 14:07

## 2020-01-01 RX ADMIN — TACROLIMUS 2 MG: 1 CAPSULE ORAL at 21:10

## 2020-01-01 RX ADMIN — INSULIN GLARGINE 5 UNITS: 100 INJECTION, SOLUTION SUBCUTANEOUS at 22:08

## 2020-01-01 RX ADMIN — LIDOCAINE HYDROCHLORIDE 10 ML: 20 SOLUTION ORAL; TOPICAL at 05:18

## 2020-01-01 RX ADMIN — SODIUM CHLORIDE 50 ML/HR: 0.9 INJECTION, SOLUTION INTRAVENOUS at 08:11

## 2020-01-01 RX ADMIN — OXYCODONE HYDROCHLORIDE 2.5 MG: 5 TABLET ORAL at 21:26

## 2020-01-01 RX ADMIN — TACROLIMUS 2 MG: 1 CAPSULE ORAL at 22:02

## 2020-01-01 RX ADMIN — HYDROMORPHONE HYDROCHLORIDE 0.2 MG: 1 INJECTION, SOLUTION INTRAMUSCULAR; INTRAVENOUS; SUBCUTANEOUS at 18:20

## 2020-01-01 RX ADMIN — SEVELAMER HYDROCHLORIDE 800 MG: 800 TABLET, FILM COATED PARENTERAL at 17:58

## 2020-01-01 RX ADMIN — LOPERAMIDE HYDROCHLORIDE 2 MG: 2 CAPSULE ORAL at 08:53

## 2020-01-01 RX ADMIN — METRONIDAZOLE 500 MG: 500 INJECTION, SOLUTION INTRAVENOUS at 05:39

## 2020-01-01 RX ADMIN — METHYLPREDNISOLONE SODIUM SUCCINATE 4 MG: 40 INJECTION, POWDER, FOR SOLUTION INTRAMUSCULAR; INTRAVENOUS at 16:56

## 2020-01-01 RX ADMIN — PROPOFOL 120 MG: 10 INJECTION, EMULSION INTRAVENOUS at 09:18

## 2020-01-01 RX ADMIN — CEFEPIME HYDROCHLORIDE 1000 MG: 1 INJECTION, POWDER, FOR SOLUTION INTRAMUSCULAR; INTRAVENOUS at 13:52

## 2020-01-01 RX ADMIN — BUSPIRONE HYDROCHLORIDE 10 MG: 10 TABLET ORAL at 10:44

## 2020-01-01 RX ADMIN — SEVELAMER HYDROCHLORIDE 800 MG: 800 TABLET, FILM COATED PARENTERAL at 13:46

## 2020-01-01 RX ADMIN — PREDNISONE 5 MG: 5 TABLET ORAL at 10:45

## 2020-01-01 RX ADMIN — SODIUM BICARBONATE 650 MG TABLET 1300 MG: at 17:59

## 2020-01-01 RX ADMIN — FENTANYL CITRATE 25 MCG: 50 INJECTION INTRAMUSCULAR; INTRAVENOUS at 09:03

## 2020-01-01 RX ADMIN — LEVOTHYROXINE SODIUM 125 MCG: 125 TABLET ORAL at 05:45

## 2020-01-01 RX ADMIN — METRONIDAZOLE 500 MG: 500 INJECTION, SOLUTION INTRAVENOUS at 22:09

## 2020-01-01 RX ADMIN — FOLIC ACID 1000 MCG: 1 TABLET ORAL at 08:08

## 2020-01-01 RX ADMIN — HYDROMORPHONE HYDROCHLORIDE 0.5 MG: 1 INJECTION, SOLUTION INTRAMUSCULAR; INTRAVENOUS; SUBCUTANEOUS at 14:16

## 2020-01-01 RX ADMIN — ONDANSETRON 4 MG: 2 INJECTION INTRAMUSCULAR; INTRAVENOUS at 13:23

## 2020-01-01 RX ADMIN — SEVELAMER HYDROCHLORIDE 800 MG: 800 TABLET, FILM COATED PARENTERAL at 17:14

## 2020-01-01 RX ADMIN — PREDNISONE 5 MG: 5 TABLET ORAL at 08:43

## 2020-01-01 RX ADMIN — CLONIDINE HYDROCHLORIDE 0.2 MG: 0.1 TABLET ORAL at 21:37

## 2020-01-01 RX ADMIN — PRAVASTATIN SODIUM 80 MG: 80 TABLET ORAL at 08:08

## 2020-01-01 RX ADMIN — LIDOCAINE HYDROCHLORIDE 10 ML: 20 SOLUTION ORAL; TOPICAL at 21:02

## 2020-01-01 RX ADMIN — EPOETIN ALFA 8000 UNITS: 4000 SOLUTION INTRAVENOUS; SUBCUTANEOUS at 11:30

## 2020-01-01 RX ADMIN — METOPROLOL TARTRATE 50 MG: 50 TABLET, FILM COATED ORAL at 10:41

## 2020-01-01 RX ADMIN — ONDANSETRON 4 MG: 2 INJECTION INTRAMUSCULAR; INTRAVENOUS at 00:04

## 2020-01-01 RX ADMIN — PANTOPRAZOLE SODIUM 40 MG: 40 TABLET, DELAYED RELEASE ORAL at 15:40

## 2020-01-01 RX ADMIN — BUSPIRONE HYDROCHLORIDE 5 MG: 5 TABLET ORAL at 10:42

## 2020-01-01 RX ADMIN — POTASSIUM CHLORIDE 20 MEQ: 14.9 INJECTION, SOLUTION INTRAVENOUS at 13:04

## 2020-01-01 RX ADMIN — FENTANYL CITRATE 25 MCG: 50 INJECTION INTRAMUSCULAR; INTRAVENOUS at 08:50

## 2020-01-01 RX ADMIN — PROPOFOL 50 MG: 10 INJECTION, EMULSION INTRAVENOUS at 11:32

## 2020-01-01 RX ADMIN — SERTRALINE HYDROCHLORIDE 200 MG: 50 TABLET ORAL at 13:48

## 2020-01-01 RX ADMIN — ONDANSETRON 4 MG: 2 INJECTION INTRAMUSCULAR; INTRAVENOUS at 00:05

## 2020-01-01 RX ADMIN — TACROLIMUS 2 MG: 1 CAPSULE ORAL at 10:47

## 2020-01-01 RX ADMIN — DEXTROSE 30 ML/HR: 5 SOLUTION INTRAVENOUS at 22:47

## 2020-01-01 RX ADMIN — DULOXETINE HYDROCHLORIDE 60 MG: 60 CAPSULE, DELAYED RELEASE ORAL at 10:40

## 2020-01-01 RX ADMIN — ROPINIROLE HYDROCHLORIDE 0.25 MG: 0.25 TABLET, FILM COATED ORAL at 10:46

## 2020-01-01 RX ADMIN — HYDROMORPHONE HYDROCHLORIDE 0.5 MG: 1 INJECTION, SOLUTION INTRAMUSCULAR; INTRAVENOUS; SUBCUTANEOUS at 22:54

## 2020-01-01 RX ADMIN — TACROLIMUS 2 MG: 1 CAPSULE ORAL at 08:43

## 2020-01-01 RX ADMIN — SODIUM BICARBONATE 650 MG TABLET 1950 MG: at 17:40

## 2020-01-01 RX ADMIN — CEFEPIME HYDROCHLORIDE 1000 MG: 1 INJECTION, POWDER, FOR SOLUTION INTRAMUSCULAR; INTRAVENOUS at 14:16

## 2020-01-01 RX ADMIN — SERTRALINE HYDROCHLORIDE 200 MG: 100 TABLET ORAL at 08:00

## 2020-01-01 RX ADMIN — METRONIDAZOLE 500 MG: 500 INJECTION, SOLUTION INTRAVENOUS at 14:07

## 2020-01-01 RX ADMIN — BUSPIRONE HYDROCHLORIDE 5 MG: 5 TABLET ORAL at 17:12

## 2020-01-01 RX ADMIN — LENALIDOMIDE 2.5 MG: 2.5 CAPSULE ORAL at 08:08

## 2020-01-01 RX ADMIN — OXYCODONE HYDROCHLORIDE 2.5 MG: 5 TABLET ORAL at 08:04

## 2020-01-01 RX ADMIN — SODIUM CHLORIDE 20 ML/HR: 0.9 INJECTION, SOLUTION INTRAVENOUS at 11:40

## 2020-01-01 RX ADMIN — HYDRALAZINE HYDROCHLORIDE 5 MG: 20 INJECTION INTRAMUSCULAR; INTRAVENOUS at 16:15

## 2020-01-01 RX ADMIN — PRAVASTATIN SODIUM 80 MG: 80 TABLET ORAL at 10:43

## 2020-01-01 RX ADMIN — PANTOPRAZOLE SODIUM 40 MG: 40 TABLET, DELAYED RELEASE ORAL at 05:46

## 2020-01-01 RX ADMIN — EPOETIN ALFA 8000 UNITS: 4000 SOLUTION INTRAVENOUS; SUBCUTANEOUS at 17:24

## 2020-01-01 RX ADMIN — ACETAMINOPHEN 650 MG: 325 TABLET, FILM COATED ORAL at 17:22

## 2020-01-01 RX ADMIN — DULOXETINE HYDROCHLORIDE 60 MG: 60 CAPSULE, DELAYED RELEASE ORAL at 08:44

## 2020-01-01 RX ADMIN — PANTOPRAZOLE SODIUM 40 MG: 40 TABLET, DELAYED RELEASE ORAL at 13:54

## 2020-01-01 RX ADMIN — SEVELAMER HYDROCHLORIDE 1600 MG: 800 TABLET, FILM COATED PARENTERAL at 17:40

## 2020-01-01 RX ADMIN — SEVELAMER HYDROCHLORIDE 800 MG: 800 TABLET, FILM COATED PARENTERAL at 17:25

## 2020-01-01 RX ADMIN — HEPARIN SODIUM 3000 UNITS: 1000 INJECTION INTRAVENOUS; SUBCUTANEOUS at 10:26

## 2020-01-01 RX ADMIN — ARIPIPRAZOLE 30 MG: 15 TABLET ORAL at 22:05

## 2020-01-01 RX ADMIN — ONDANSETRON 4 MG: 2 INJECTION INTRAMUSCULAR; INTRAVENOUS at 07:38

## 2020-01-01 RX ADMIN — LEVOTHYROXINE SODIUM 125 MCG: 125 TABLET ORAL at 06:29

## 2020-01-01 RX ADMIN — SODIUM BICARBONATE 650 MG TABLET 1300 MG: at 08:49

## 2020-01-01 RX ADMIN — ONDANSETRON 4 MG: 2 INJECTION INTRAMUSCULAR; INTRAVENOUS at 05:21

## 2020-01-01 RX ADMIN — PRAVASTATIN SODIUM 80 MG: 80 TABLET ORAL at 18:03

## 2020-01-01 RX ADMIN — HEPARIN SODIUM 5000 UNITS: 5000 INJECTION INTRAVENOUS; SUBCUTANEOUS at 05:47

## 2020-01-01 RX ADMIN — ASPIRIN 81 MG: 81 TABLET, COATED ORAL at 13:51

## 2020-01-01 RX ADMIN — Medication 250 MG: at 10:42

## 2020-01-01 RX ADMIN — METOCLOPRAMIDE 5 MG: 5 INJECTION, SOLUTION INTRAMUSCULAR; INTRAVENOUS at 03:08

## 2020-01-01 RX ADMIN — AMLODIPINE BESYLATE 10 MG: 10 TABLET ORAL at 21:10

## 2020-01-01 RX ADMIN — SEVELAMER HYDROCHLORIDE 800 MG: 800 TABLET, FILM COATED PARENTERAL at 17:40

## 2020-01-01 RX ADMIN — DULOXETINE HYDROCHLORIDE 60 MG: 60 CAPSULE, DELAYED RELEASE ORAL at 17:40

## 2020-01-01 RX ADMIN — HEPARIN SODIUM 5000 UNITS: 5000 INJECTION INTRAVENOUS; SUBCUTANEOUS at 05:18

## 2020-01-01 RX ADMIN — SUCRALFATE 1 G: 1 TABLET ORAL at 05:45

## 2020-01-01 RX ADMIN — DARBEPOETIN ALFA 200 MCG: 200 INJECTION, SOLUTION INTRAVENOUS; SUBCUTANEOUS at 14:13

## 2020-01-01 RX ADMIN — BUSPIRONE HYDROCHLORIDE 10 MG: 5 TABLET ORAL at 08:44

## 2020-01-01 RX ADMIN — HEPARIN SODIUM 5000 UNITS: 5000 INJECTION INTRAVENOUS; SUBCUTANEOUS at 13:49

## 2020-01-01 RX ADMIN — DOXAZOSIN 2 MG: 2 TABLET ORAL at 21:37

## 2020-01-01 RX ADMIN — ASPIRIN 81 MG: 81 TABLET, COATED ORAL at 08:08

## 2020-01-01 RX ADMIN — SODIUM CHLORIDE, SODIUM GLUCONATE, SODIUM ACETATE, POTASSIUM CHLORIDE AND MAGNESIUM CHLORIDE 1000 ML: 526; 502; 368; 37; 30 INJECTION, SOLUTION INTRAVENOUS at 10:09

## 2020-01-01 RX ADMIN — TACROLIMUS 2 MG: 1 CAPSULE ORAL at 08:34

## 2020-01-01 RX ADMIN — HEPARIN SODIUM 5000 UNITS: 5000 INJECTION INTRAVENOUS; SUBCUTANEOUS at 21:26

## 2020-01-01 RX ADMIN — EPOETIN ALFA 5000 UNITS: 10000 SOLUTION INTRAVENOUS; SUBCUTANEOUS at 15:30

## 2020-01-01 RX ADMIN — LOPERAMIDE HYDROCHLORIDE 2 MG: 2 CAPSULE ORAL at 16:32

## 2020-01-01 RX ADMIN — DOXERCALCIFEROL 1 MCG: 4 INJECTION, SOLUTION INTRAVENOUS at 16:54

## 2020-01-01 RX ADMIN — POLYETHYLENE GLYCOL 3350 34 G: 17 POWDER, FOR SOLUTION ORAL at 10:43

## 2020-01-01 RX ADMIN — LIDOCAINE HYDROCHLORIDE 10 ML: 20 SOLUTION ORAL; TOPICAL at 17:59

## 2020-01-01 RX ADMIN — EPOETIN ALFA 8000 UNITS: 4000 SOLUTION INTRAVENOUS; SUBCUTANEOUS at 09:48

## 2020-01-01 RX ADMIN — ARIPIPRAZOLE 30 MG: 15 TABLET ORAL at 22:04

## 2020-01-01 RX ADMIN — CLONIDINE HYDROCHLORIDE 0.2 MG: 0.1 TABLET ORAL at 05:39

## 2020-01-01 RX ADMIN — METRONIDAZOLE 500 MG: 500 INJECTION, SOLUTION INTRAVENOUS at 16:31

## 2020-01-01 RX ADMIN — PRAVASTATIN SODIUM 80 MG: 80 TABLET ORAL at 13:50

## 2020-01-01 RX ADMIN — IOHEXOL 100 ML: 350 INJECTION, SOLUTION INTRAVENOUS at 13:37

## 2020-01-01 RX ADMIN — SODIUM CHLORIDE 50 ML/HR: 0.9 INJECTION, SOLUTION INTRAVENOUS at 08:10

## 2020-01-01 RX ADMIN — ASPIRIN 81 MG: 81 TABLET, COATED ORAL at 10:43

## 2020-01-01 RX ADMIN — EPOETIN ALFA 8000 UNITS: 4000 SOLUTION INTRAVENOUS; SUBCUTANEOUS at 09:12

## 2020-01-01 RX ADMIN — ARIPIPRAZOLE 30 MG: 10 TABLET ORAL at 22:10

## 2020-01-01 RX ADMIN — SEVELAMER HYDROCHLORIDE 800 MG: 800 TABLET, FILM COATED PARENTERAL at 07:59

## 2020-01-01 RX ADMIN — ONDANSETRON 4 MG: 2 INJECTION INTRAMUSCULAR; INTRAVENOUS at 18:28

## 2020-01-01 RX ADMIN — SUCRALFATE 1 G: 1 TABLET ORAL at 17:13

## 2020-01-01 RX ADMIN — SERTRALINE HYDROCHLORIDE 200 MG: 50 TABLET ORAL at 08:50

## 2020-01-01 RX ADMIN — ASPIRIN 81 MG: 81 TABLET, COATED ORAL at 08:13

## 2020-01-01 RX ADMIN — BUSPIRONE HYDROCHLORIDE 10 MG: 10 TABLET ORAL at 17:29

## 2020-01-01 RX ADMIN — SEVELAMER HYDROCHLORIDE 800 MG: 800 TABLET, FILM COATED PARENTERAL at 12:25

## 2020-01-01 RX ADMIN — SODIUM BICARBONATE 650 MG TABLET 1300 MG: at 17:41

## 2020-01-01 RX ADMIN — SEVELAMER HYDROCHLORIDE 800 MG: 800 TABLET, FILM COATED PARENTERAL at 11:12

## 2020-01-01 RX ADMIN — PREDNISONE 5 MG: 5 TABLET ORAL at 08:08

## 2020-01-01 RX ADMIN — DEXTROSE 30 ML/HR: 5 SOLUTION INTRAVENOUS at 13:51

## 2020-01-01 RX ADMIN — TORSEMIDE 20 MG: 20 TABLET ORAL at 08:01

## 2020-01-01 RX ADMIN — METRONIDAZOLE 500 MG: 500 INJECTION, SOLUTION INTRAVENOUS at 06:31

## 2020-01-01 RX ADMIN — SEVELAMER HYDROCHLORIDE 1600 MG: 800 TABLET, FILM COATED PARENTERAL at 16:32

## 2020-01-01 RX ADMIN — HEPARIN SODIUM 5000 UNITS: 5000 INJECTION INTRAVENOUS; SUBCUTANEOUS at 14:23

## 2020-01-01 RX ADMIN — HYDRALAZINE HYDROCHLORIDE 100 MG: 25 TABLET ORAL at 21:09

## 2020-01-01 RX ADMIN — SEVELAMER HYDROCHLORIDE 800 MG: 800 TABLET, FILM COATED PARENTERAL at 08:08

## 2020-01-01 RX ADMIN — FENTANYL CITRATE 50 MCG: 50 INJECTION, SOLUTION INTRAMUSCULAR; INTRAVENOUS at 09:45

## 2020-01-01 RX ADMIN — BUSPIRONE HYDROCHLORIDE 10 MG: 10 TABLET ORAL at 17:41

## 2020-01-01 RX ADMIN — CLONIDINE HYDROCHLORIDE 0.2 MG: 0.1 TABLET ORAL at 21:09

## 2020-01-01 RX ADMIN — ROPINIROLE HYDROCHLORIDE 0.25 MG: 0.25 TABLET, FILM COATED ORAL at 08:48

## 2020-01-01 RX ADMIN — MIDAZOLAM HYDROCHLORIDE 0.5 MG: 1 INJECTION, SOLUTION INTRAMUSCULAR; INTRAVENOUS at 08:43

## 2020-01-01 RX ADMIN — CEFEPIME HYDROCHLORIDE 2000 MG: 2 INJECTION, POWDER, FOR SOLUTION INTRAVENOUS at 13:36

## 2020-01-01 RX ADMIN — OXYCODONE HYDROCHLORIDE 2.5 MG: 5 TABLET ORAL at 12:43

## 2020-01-01 RX ADMIN — LIDOCAINE HYDROCHLORIDE 10 ML: 20 SOLUTION ORAL; TOPICAL at 11:56

## 2020-01-01 RX ADMIN — FOLIC ACID 1000 MCG: 1 TABLET ORAL at 08:32

## 2020-01-01 RX ADMIN — CEFEPIME HYDROCHLORIDE 1000 MG: 1 INJECTION, POWDER, FOR SOLUTION INTRAMUSCULAR; INTRAVENOUS at 14:18

## 2020-01-01 RX ADMIN — ARIPIPRAZOLE 30 MG: 10 TABLET ORAL at 21:37

## 2020-01-01 RX ADMIN — CEFAZOLIN 2000 MG: 1 INJECTION, POWDER, FOR SOLUTION INTRAMUSCULAR; INTRAVENOUS at 09:45

## 2020-01-01 RX ADMIN — BUSPIRONE HYDROCHLORIDE 10 MG: 5 TABLET ORAL at 08:01

## 2020-01-01 RX ADMIN — SEVELAMER HYDROCHLORIDE 800 MG: 800 TABLET, FILM COATED PARENTERAL at 17:13

## 2020-01-01 RX ADMIN — HYDROMORPHONE HYDROCHLORIDE 0.2 MG: 1 INJECTION, SOLUTION INTRAMUSCULAR; INTRAVENOUS; SUBCUTANEOUS at 13:03

## 2020-01-01 RX ADMIN — TACROLIMUS 1 MG: 1 CAPSULE ORAL at 21:02

## 2020-01-01 RX ADMIN — HEPARIN SODIUM 5000 UNITS: 5000 INJECTION INTRAVENOUS; SUBCUTANEOUS at 22:02

## 2020-01-01 RX ADMIN — HEPARIN SODIUM 5000 UNITS: 5000 INJECTION INTRAVENOUS; SUBCUTANEOUS at 05:12

## 2020-01-01 RX ADMIN — ACETAMINOPHEN 650 MG: 325 TABLET, FILM COATED ORAL at 13:56

## 2020-01-01 RX ADMIN — SUCRALFATE 1 G: 1 TABLET ORAL at 22:57

## 2020-01-01 RX ADMIN — PANTOPRAZOLE SODIUM 40 MG: 40 INJECTION, POWDER, FOR SOLUTION INTRAVENOUS at 08:08

## 2020-01-01 RX ADMIN — DOXERCALCIFEROL 1 MCG: 4 INJECTION, SOLUTION INTRAVENOUS at 09:10

## 2020-01-01 RX ADMIN — INSULIN GLARGINE 3 UNITS: 100 INJECTION, SOLUTION SUBCUTANEOUS at 20:53

## 2020-01-01 RX ADMIN — ROCURONIUM BROMIDE 20 MG: 50 INJECTION, SOLUTION INTRAVENOUS at 10:15

## 2020-01-01 RX ADMIN — SERTRALINE HYDROCHLORIDE 200 MG: 50 TABLET ORAL at 08:05

## 2020-01-01 RX ADMIN — DARBEPOETIN ALFA 200 MCG: 200 INJECTION, SOLUTION INTRAVENOUS; SUBCUTANEOUS at 11:13

## 2020-01-01 RX ADMIN — ARIPIPRAZOLE 30 MG: 15 TABLET ORAL at 21:03

## 2020-01-01 RX ADMIN — METOCLOPRAMIDE 10 MG: 5 INJECTION, SOLUTION INTRAMUSCULAR; INTRAVENOUS at 23:03

## 2020-01-01 RX ADMIN — PANTOPRAZOLE SODIUM 40 MG: 40 INJECTION, POWDER, FOR SOLUTION INTRAVENOUS at 08:33

## 2020-01-01 RX ADMIN — Medication 10 ML: at 11:36

## 2020-01-01 RX ADMIN — LEVOTHYROXINE SODIUM 125 MCG: 125 TABLET ORAL at 05:16

## 2020-01-01 RX ADMIN — TACROLIMUS 2 MG: 1 CAPSULE ORAL at 21:36

## 2020-01-01 RX ADMIN — TACROLIMUS 1 MG: 1 CAPSULE ORAL at 20:54

## 2020-01-01 RX ADMIN — LEVOTHYROXINE SODIUM 125 MCG: 125 TABLET ORAL at 05:12

## 2020-01-01 RX ADMIN — SODIUM BICARBONATE 650 MG TABLET 1950 MG: at 18:20

## 2020-01-01 RX ADMIN — CLONIDINE HYDROCHLORIDE 0.2 MG: 0.1 TABLET ORAL at 05:16

## 2020-01-01 RX ADMIN — METOPROLOL TARTRATE 50 MG: 50 TABLET, FILM COATED ORAL at 21:37

## 2020-01-01 RX ADMIN — METRONIDAZOLE 500 MG: 500 INJECTION, SOLUTION INTRAVENOUS at 20:32

## 2020-01-01 RX ADMIN — SODIUM CHLORIDE 50 ML/HR: 0.9 INJECTION, SOLUTION INTRAVENOUS at 05:24

## 2020-01-01 RX ADMIN — ROPINIROLE HYDROCHLORIDE 0.25 MG: 0.25 TABLET, FILM COATED ORAL at 17:13

## 2020-01-01 RX ADMIN — MELATONIN 3000 UNITS: at 08:43

## 2020-01-01 RX ADMIN — AMLODIPINE BESYLATE 10 MG: 10 TABLET ORAL at 22:08

## 2020-01-01 RX ADMIN — MIDAZOLAM HYDROCHLORIDE 0.5 MG: 1 INJECTION, SOLUTION INTRAMUSCULAR; INTRAVENOUS at 09:03

## 2020-01-01 RX ADMIN — FENTANYL CITRATE 50 MCG: 50 INJECTION, SOLUTION INTRAMUSCULAR; INTRAVENOUS at 09:24

## 2020-01-01 RX ADMIN — SODIUM BICARBONATE 650 MG TABLET 1300 MG: at 22:02

## 2020-01-01 RX ADMIN — FOLIC ACID 1000 MCG: 1 TABLET ORAL at 08:43

## 2020-01-01 RX ADMIN — HYDRALAZINE HYDROCHLORIDE 100 MG: 50 TABLET, FILM COATED ORAL at 05:28

## 2020-01-01 RX ADMIN — METOPROLOL TARTRATE 50 MG: 50 TABLET, FILM COATED ORAL at 18:02

## 2020-01-01 RX ADMIN — FOLIC ACID 1000 MCG: 1 TABLET ORAL at 12:26

## 2020-01-01 RX ADMIN — ASPIRIN 81 MG 81 MG: 81 TABLET ORAL at 08:44

## 2020-01-01 RX ADMIN — CEFEPIME HYDROCHLORIDE 1000 MG: 1 INJECTION, POWDER, FOR SOLUTION INTRAMUSCULAR; INTRAVENOUS at 13:48

## 2020-01-01 RX ADMIN — LEVOTHYROXINE SODIUM 125 MCG: 125 TABLET ORAL at 05:28

## 2020-01-01 RX ADMIN — SEVELAMER HYDROCHLORIDE 1600 MG: 800 TABLET, FILM COATED PARENTERAL at 08:31

## 2020-01-01 RX ADMIN — PREDNISONE 5 MG: 5 TABLET ORAL at 08:32

## 2020-01-01 RX ADMIN — BUSPIRONE HYDROCHLORIDE 10 MG: 5 TABLET ORAL at 17:54

## 2020-01-01 RX ADMIN — DOXAZOSIN 4 MG: 4 TABLET ORAL at 21:10

## 2020-01-01 RX ADMIN — BUSPIRONE HYDROCHLORIDE 10 MG: 10 TABLET ORAL at 08:50

## 2020-01-01 RX ADMIN — CEFAZOLIN SODIUM 2000 MG: 2 SOLUTION INTRAVENOUS at 08:38

## 2020-01-01 RX ADMIN — PREDNISONE 5 MG: 5 TABLET ORAL at 10:42

## 2020-01-01 RX ADMIN — TACROLIMUS 2 MG: 1 CAPSULE ORAL at 22:00

## 2020-01-01 RX ADMIN — DARBEPOETIN ALFA 200 MCG: 200 INJECTION, SOLUTION INTRAVENOUS; SUBCUTANEOUS at 15:23

## 2020-01-01 RX ADMIN — HYDRALAZINE HYDROCHLORIDE 75 MG: 25 TABLET ORAL at 21:36

## 2020-01-01 RX ADMIN — ROPINIROLE HYDROCHLORIDE 0.25 MG: 0.25 TABLET, FILM COATED ORAL at 18:03

## 2020-01-01 RX ADMIN — ONDANSETRON 4 MG: 2 INJECTION INTRAMUSCULAR; INTRAVENOUS at 08:26

## 2020-01-01 RX ADMIN — CEFAZOLIN SODIUM 1000 MG: 1 SOLUTION INTRAVENOUS at 22:08

## 2020-01-01 RX ADMIN — ROPINIROLE HYDROCHLORIDE 0.25 MG: 0.25 TABLET, FILM COATED ORAL at 13:46

## 2020-01-01 RX ADMIN — ARIPIPRAZOLE 30 MG: 10 TABLET ORAL at 21:13

## 2020-01-01 RX ADMIN — FOLIC ACID 1000 MCG: 1 TABLET ORAL at 08:00

## 2020-01-01 RX ADMIN — HEPARIN SODIUM 5000 UNITS: 5000 INJECTION INTRAVENOUS; SUBCUTANEOUS at 20:53

## 2020-01-01 RX ADMIN — PRAVASTATIN SODIUM 80 MG: 80 TABLET ORAL at 17:54

## 2020-01-01 RX ADMIN — ARIPIPRAZOLE 30 MG: 15 TABLET ORAL at 22:02

## 2020-01-01 RX ADMIN — SODIUM CHLORIDE 1000 ML: 0.9 INJECTION, SOLUTION INTRAVENOUS at 06:59

## 2020-01-01 RX ADMIN — AMLODIPINE BESYLATE 10 MG: 10 TABLET ORAL at 10:41

## 2020-01-01 RX ADMIN — HEPARIN SODIUM 5000 UNITS: 5000 INJECTION INTRAVENOUS; SUBCUTANEOUS at 14:12

## 2020-01-01 RX ADMIN — TACROLIMUS 2 MG: 1 CAPSULE ORAL at 08:07

## 2020-01-01 RX ADMIN — HYDROMORPHONE HYDROCHLORIDE 0.2 MG: 1 INJECTION, SOLUTION INTRAMUSCULAR; INTRAVENOUS; SUBCUTANEOUS at 00:04

## 2020-01-01 RX ADMIN — BUSPIRONE HYDROCHLORIDE 10 MG: 10 TABLET ORAL at 08:32

## 2020-01-01 RX ADMIN — TACROLIMUS 2 MG: 1 CAPSULE ORAL at 08:48

## 2020-01-01 RX ADMIN — PRAVASTATIN SODIUM 80 MG: 80 TABLET ORAL at 08:03

## 2020-01-01 RX ADMIN — ONDANSETRON 4 MG: 2 INJECTION INTRAMUSCULAR; INTRAVENOUS at 12:20

## 2020-01-01 RX ADMIN — ASPIRIN 81 MG: 81 TABLET, COATED ORAL at 08:50

## 2020-01-01 RX ADMIN — SODIUM BICARBONATE 650 MG TABLET 1300 MG: at 08:03

## 2020-01-01 RX ADMIN — SODIUM CHLORIDE, SODIUM GLUCONATE, SODIUM ACETATE, POTASSIUM CHLORIDE, MAGNESIUM CHLORIDE, SODIUM PHOSPHATE, DIBASIC, AND POTASSIUM PHOSPHATE 1000 ML: .53; .5; .37; .037; .03; .012; .00082 INJECTION, SOLUTION INTRAVENOUS at 11:51

## 2020-01-01 RX ADMIN — LORAZEPAM 2 MG: 1 TABLET ORAL at 18:03

## 2020-01-01 RX ADMIN — SEVELAMER HYDROCHLORIDE 1600 MG: 800 TABLET, FILM COATED PARENTERAL at 12:11

## 2020-01-01 RX ADMIN — ROPINIROLE HYDROCHLORIDE 0.25 MG: 0.25 TABLET, FILM COATED ORAL at 08:35

## 2020-01-01 RX ADMIN — HYDRALAZINE HYDROCHLORIDE 100 MG: 50 TABLET, FILM COATED ORAL at 14:12

## 2020-01-01 RX ADMIN — CLONIDINE HYDROCHLORIDE 0.2 MG: 0.1 TABLET ORAL at 06:27

## 2020-01-01 RX ADMIN — CEFEPIME HYDROCHLORIDE 1000 MG: 1 INJECTION, POWDER, FOR SOLUTION INTRAMUSCULAR; INTRAVENOUS at 13:30

## 2020-01-01 RX ADMIN — HEPARIN SODIUM 5000 UNITS: 5000 INJECTION INTRAVENOUS; SUBCUTANEOUS at 21:44

## 2020-01-01 RX ADMIN — METRONIDAZOLE 500 MG: 500 INJECTION, SOLUTION INTRAVENOUS at 21:13

## 2020-01-01 RX ADMIN — ONDANSETRON 4 MG: 2 INJECTION INTRAMUSCULAR; INTRAVENOUS at 09:47

## 2020-01-01 RX ADMIN — HEPARIN SODIUM 5000 UNITS: 5000 INJECTION INTRAVENOUS; SUBCUTANEOUS at 13:54

## 2020-01-01 RX ADMIN — GLYCOPYRROLATE 0.4 MG: 0.2 INJECTION, SOLUTION INTRAMUSCULAR; INTRAVENOUS at 10:56

## 2020-01-01 RX ADMIN — METRONIDAZOLE 500 MG: 500 INJECTION, SOLUTION INTRAVENOUS at 12:21

## 2020-01-01 RX ADMIN — PREDNISONE 5 MG: 5 TABLET ORAL at 08:17

## 2020-01-01 RX ADMIN — SERTRALINE HYDROCHLORIDE 200 MG: 50 TABLET ORAL at 10:44

## 2020-01-01 RX ADMIN — BUSPIRONE HYDROCHLORIDE 10 MG: 5 TABLET ORAL at 18:02

## 2020-01-01 RX ADMIN — HYDRALAZINE HYDROCHLORIDE 100 MG: 25 TABLET ORAL at 21:27

## 2020-01-01 RX ADMIN — TACROLIMUS 1 MG: 1 CAPSULE ORAL at 21:25

## 2020-01-01 RX ADMIN — DULOXETINE HYDROCHLORIDE 60 MG: 60 CAPSULE, DELAYED RELEASE ORAL at 17:11

## 2020-01-01 RX ADMIN — SODIUM BICARBONATE 650 MG TABLET 1950 MG: at 16:33

## 2020-01-01 RX ADMIN — TACROLIMUS 2 MG: 1 CAPSULE ORAL at 13:47

## 2020-01-01 RX ADMIN — FOLIC ACID 1000 MCG: 1 TABLET ORAL at 08:50

## 2020-01-01 RX ADMIN — SODIUM CHLORIDE 20 ML/HR: 0.9 INJECTION, SOLUTION INTRAVENOUS at 12:35

## 2020-01-01 RX ADMIN — BUSPIRONE HYDROCHLORIDE 10 MG: 10 TABLET ORAL at 13:47

## 2020-01-01 RX ADMIN — OXYCODONE HYDROCHLORIDE 2.5 MG: 5 TABLET ORAL at 17:29

## 2020-01-01 RX ADMIN — LIDOCAINE HYDROCHLORIDE 0.2 ML: 10 INJECTION, SOLUTION EPIDURAL; INFILTRATION; INTRACAUDAL; PERINEURAL at 08:10

## 2020-01-01 RX ADMIN — HYDRALAZINE HYDROCHLORIDE 50 MG: 50 TABLET, FILM COATED ORAL at 05:34

## 2020-01-01 RX ADMIN — PREDNISONE 5 MG: 5 TABLET ORAL at 13:50

## 2020-01-01 RX ADMIN — SEVELAMER HYDROCHLORIDE 1600 MG: 800 TABLET, FILM COATED PARENTERAL at 16:13

## 2020-01-01 RX ADMIN — HEPARIN SODIUM 5000 UNITS: 5000 INJECTION INTRAVENOUS; SUBCUTANEOUS at 13:23

## 2020-01-01 RX ADMIN — SODIUM BICARBONATE 650 MG TABLET 1300 MG: at 17:12

## 2020-01-01 RX ADMIN — LIDOCAINE HYDROCHLORIDE 10 ML: 20 SOLUTION ORAL; TOPICAL at 12:26

## 2020-01-01 RX ADMIN — MIDAZOLAM HYDROCHLORIDE 0.5 MG: 1 INJECTION, SOLUTION INTRAMUSCULAR; INTRAVENOUS at 08:50

## 2020-01-01 RX ADMIN — SODIUM BICARBONATE 650 MG TABLET 1950 MG: at 21:10

## 2020-01-01 RX ADMIN — FENTANYL CITRATE 75 MCG: 50 INJECTION, SOLUTION INTRAMUSCULAR; INTRAVENOUS at 10:58

## 2020-01-01 RX ADMIN — TACROLIMUS 2 MG: 1 CAPSULE ORAL at 21:27

## 2020-01-01 RX ADMIN — INSULIN GLARGINE 5 UNITS: 100 INJECTION, SOLUTION SUBCUTANEOUS at 21:14

## 2020-01-01 RX ADMIN — SEVELAMER HYDROCHLORIDE 1600 MG: 800 TABLET, FILM COATED PARENTERAL at 08:00

## 2020-01-01 RX ADMIN — LEVOTHYROXINE SODIUM 125 MCG: 125 TABLET ORAL at 05:17

## 2020-01-01 RX ADMIN — DOXAZOSIN 4 MG: 4 TABLET ORAL at 22:08

## 2020-01-01 RX ADMIN — METOPROLOL TARTRATE 50 MG: 50 TABLET, FILM COATED ORAL at 17:54

## 2020-01-01 RX ADMIN — TACROLIMUS 1 MG: 1 CAPSULE ORAL at 21:49

## 2020-01-01 RX ADMIN — BUSPIRONE HYDROCHLORIDE 10 MG: 10 TABLET ORAL at 08:04

## 2020-01-01 RX ADMIN — ACETAMINOPHEN 650 MG: 325 TABLET, FILM COATED ORAL at 11:12

## 2020-01-01 RX ADMIN — SODIUM BICARBONATE 650 MG TABLET 1950 MG: at 22:07

## 2020-01-01 RX ADMIN — INSULIN GLARGINE 3 UNITS: 100 INJECTION, SOLUTION SUBCUTANEOUS at 22:02

## 2020-01-01 RX ADMIN — HYDRALAZINE HYDROCHLORIDE 100 MG: 25 TABLET ORAL at 08:00

## 2020-01-01 RX ADMIN — SODIUM BICARBONATE 650 MG TABLET 1950 MG: at 21:27

## 2020-01-01 RX ADMIN — ROPINIROLE HYDROCHLORIDE 0.25 MG: 0.25 TABLET, FILM COATED ORAL at 17:54

## 2020-01-01 RX ADMIN — TACROLIMUS 1 MG: 1 CAPSULE ORAL at 21:08

## 2020-01-01 RX ADMIN — SODIUM BICARBONATE 650 MG TABLET 1950 MG: at 08:01

## 2020-01-01 RX ADMIN — FUROSEMIDE 20 MG: 10 INJECTION, SOLUTION INTRAMUSCULAR; INTRAVENOUS at 10:18

## 2020-01-01 RX ADMIN — METOPROLOL TARTRATE 50 MG: 50 TABLET, FILM COATED ORAL at 17:41

## 2020-01-01 RX ADMIN — CEFAZOLIN SODIUM 1000 MG: 1 SOLUTION INTRAVENOUS at 21:17

## 2020-01-01 RX ADMIN — ONDANSETRON 4 MG: 2 INJECTION INTRAMUSCULAR; INTRAVENOUS at 17:54

## 2020-01-01 RX ADMIN — TACROLIMUS 2 MG: 1 CAPSULE ORAL at 10:45

## 2020-01-01 RX ADMIN — PRAVASTATIN SODIUM 80 MG: 80 TABLET ORAL at 08:49

## 2020-01-01 RX ADMIN — NEOSTIGMINE METHYLSULFATE 3 MG: 1 INJECTION INTRAVENOUS at 10:56

## 2020-01-01 RX ADMIN — SEVELAMER HYDROCHLORIDE 800 MG: 800 TABLET, FILM COATED PARENTERAL at 08:06

## 2020-01-01 RX ADMIN — PRAVASTATIN SODIUM 80 MG: 80 TABLET ORAL at 08:32

## 2020-01-01 RX ADMIN — SPIRONOLACTONE 25 MG: 25 TABLET ORAL at 10:42

## 2020-01-01 RX ADMIN — FENTANYL CITRATE 25 MCG: 50 INJECTION, SOLUTION INTRAMUSCULAR; INTRAVENOUS at 11:26

## 2020-01-01 RX ADMIN — ARIPIPRAZOLE 30 MG: 15 TABLET ORAL at 21:25

## 2020-01-01 RX ADMIN — LIDOCAINE HYDROCHLORIDE 10 ML: 20 SOLUTION ORAL; TOPICAL at 05:46

## 2020-01-01 RX ADMIN — HEPARIN SODIUM 5000 UNITS: 5000 INJECTION INTRAVENOUS; SUBCUTANEOUS at 05:28

## 2020-01-01 RX ADMIN — Medication 250 MG: at 17:11

## 2020-01-01 RX ADMIN — DULOXETINE HYDROCHLORIDE 60 MG: 60 CAPSULE, DELAYED RELEASE ORAL at 08:00

## 2020-01-01 RX ADMIN — HYDRALAZINE HYDROCHLORIDE 100 MG: 50 TABLET, FILM COATED ORAL at 21:44

## 2020-01-01 RX ADMIN — PANTOPRAZOLE SODIUM 40 MG: 40 INJECTION, POWDER, FOR SOLUTION INTRAVENOUS at 17:13

## 2020-01-01 RX ADMIN — PANTOPRAZOLE SODIUM 40 MG: 40 INJECTION, POWDER, FOR SOLUTION INTRAVENOUS at 08:03

## 2020-01-01 RX ADMIN — MELATONIN 3000 UNITS: at 08:00

## 2020-01-01 RX ADMIN — TACROLIMUS 1 MG: 1 CAPSULE ORAL at 22:04

## 2020-01-01 RX ADMIN — CLONIDINE HYDROCHLORIDE 0.2 MG: 0.1 TABLET ORAL at 22:08

## 2020-01-01 RX ADMIN — TACROLIMUS 2 MG: 1 CAPSULE ORAL at 12:26

## 2020-01-01 RX ADMIN — DULOXETINE HYDROCHLORIDE 60 MG: 60 CAPSULE, DELAYED RELEASE ORAL at 18:02

## 2020-01-01 RX ADMIN — ROPINIROLE HYDROCHLORIDE 0.25 MG: 0.25 TABLET, FILM COATED ORAL at 18:00

## 2020-01-01 RX ADMIN — PRAVASTATIN SODIUM 80 MG: 80 TABLET ORAL at 10:45

## 2020-01-01 RX ADMIN — ASPIRIN 81 MG 81 MG: 81 TABLET ORAL at 10:40

## 2020-01-01 RX ADMIN — BUSPIRONE HYDROCHLORIDE 10 MG: 10 TABLET ORAL at 17:58

## 2020-01-01 RX ADMIN — TACROLIMUS 1 MG: 1 CAPSULE ORAL at 22:06

## 2020-01-01 RX ADMIN — LIDOCAINE HYDROCHLORIDE 10 ML: 20 SOLUTION ORAL; TOPICAL at 01:12

## 2020-01-01 RX ADMIN — DOXAZOSIN 4 MG: 4 TABLET ORAL at 21:27

## 2020-01-01 RX ADMIN — DEXTROSE 50 ML/HR: 5 SOLUTION INTRAVENOUS at 21:52

## 2020-01-01 RX ADMIN — OXYCODONE HYDROCHLORIDE 2.5 MG: 5 TABLET ORAL at 04:10

## 2020-01-01 RX ADMIN — HYDRALAZINE HYDROCHLORIDE 100 MG: 25 TABLET ORAL at 18:19

## 2020-01-01 RX ADMIN — BUSPIRONE HYDROCHLORIDE 10 MG: 10 TABLET ORAL at 17:22

## 2020-01-01 RX ADMIN — DOXERCALCIFEROL 1 MCG: 4 INJECTION, SOLUTION INTRAVENOUS at 09:46

## 2020-01-01 NOTE — ASSESSMENT & PLAN NOTE
· Baseline creatinine 2 5-3  · Creatinine today: 2 57  · Nephrology following and appreciate their input  · Diuretics managed by Nephrology  · Now on p o  Bicarb and off bicarb infusion  · Spironolactone added due to hypokalemia  Consider discontinuing spironolactone if creatinine continues to trend up  · Avoid nephrotoxins as able, renally dose medications as indicated    1/1-appreciate Nephrology recommendations, have signed off and patient is stable for discharge

## 2020-01-01 NOTE — PROGRESS NOTES
Progress Note - Keaton Sheikh 1965, 53 y o  female MRN: [id number]    Unit/Bed#: -01 Encounter: [id number]    Primary Care Provider: Bianka Mattson MD   Date and time admitted to hospital: 12/9/2019  8:29 PM        Renal transplant recipient  Assessment & Plan  · On chronic immunosuppression with tacrolimus and prednisone  · Unfortunately patient noncompliant with all medications since discharge including these  · Tacrolimus level was 4 > 10 > 7 (12/12) > 5 (12/14) remains stable at 4 4  · Dose decreased to 2 BID on 12/12, again decrease to 2 mg QAM & 1 5 mg HS from 12/14  · Tacrolimus level was stable on the current dosing  · Last level 4 5    1/1-currently patient on 2 mg tacrolimus b i d  Bacteremia  Assessment & Plan  · 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  · ID has been monitoring off antibiotics  · Pt remains afebrile w/o WBC  · Continue to monitor     Asymptomatic bacteriuria  Assessment & Plan  · Recently completed 7 D course of IV daptomycin for VRE UTI  Monitoring off antibiotic therapy  · ID consulted   · Has positive cultures for VRE again, but this is not the cause of her symptoms, its colonization  · Monitor off Abx      Diarrhea  Assessment & Plan  · Likely secondary to constipation/fecal stasis  · Stool culture, C diff negative  · Obstruction series (12/27/19): Nonobstructive bowel gas pattern with decreased colonic stool burden  · Started on MiraLax 34 g daily per GI  Now with 5-6 episodes of loose stools per day  · Will discontinue Miralax for now  Once loose stools have resolved, would resume 17 g/day  Metabolic acidosis  Assessment & Plan  · Appears acute on chronic  · Bicarb 17 today  · S/P kidney and pancreatic transplant in 1998  · Continue p o  Sodium bicarb and trend BMP  · Patient with an RTA related to her pancreatic transplant per Nephrology  · P o   Bicarb supplementation as per Nephrology, appreciate input  1/1-nephrology increased bicarbonate 1300 t i d , monitor on morning labs  Multiple myeloma not having achieved remission [facility])  Assessment & Plan  · Follows with Dr Micki Lozada as outpatient  · Currently off Revlimid  · Hemoglobin stable between 7-8  · Known history of transfusions in past   Has multiple autoantibodies  Essential hypertension  Assessment & Plan  · Blood pressures have remained variable  Patient currently on clonidine 0 2 mg TID, hydralazine 50 mg Q8, Cardura 20 mg HS, metoprolol tartrate 50 mg BID, Amlodipine 10 mg daily, spironolactone 25 mg daily and torsemide 20 mg daily  1/1-blood pressure is noted to be elevated- 170's-180's; increased hydralazine to 75 mg t i d     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 [facility])  Assessment & Plan  Lab Results   Component Value Date    HGBA1C 5 1 09/09/2019     Recent Labs     12/31/19  2107 01/01/20  0648 01/01/20  1055 01/01/20  1603   POCGLU 112 116 89 131     Blood Sugar Average: Last 72 hrs:  · Stopped Lantus (in place of Toujeo) 5 units qHS due to hypoglycemia  · Cont SSI with accu-cheks and carb controlled diet    Colitis  Assessment & Plan  With history of recurrent UTIs in setting of renal and pancreatic transplant  Most recently with VRE at recent admission to [facility]  Presenting with abdominal pain/nausea and lethargy/confusion  · CT abdomen/pelvis on admission - bowel wall thickening involving the proximal to mid sigmoid and the distal left colon  · Consulted GI -  Do not suspect colitis  Likely due to constipation/fecal stasis  Abdominal pain improved with MiraLax  GI recommend starting MiraLax daily  Outpatient colonoscopy  Follow-up GI in 1 month  GI signed off  · Stool cdiff neg x2, enteric panel neg, leucocytes neg  · CMV PCR - negative    Chronic kidney disease, stage 4 (severe) (HCC)  Assessment & Plan  · Baseline creatinine 2 5-3    · Creatinine today: 2 57  · Nephrology following and appreciate their input  · Diuretics managed by Nephrology  · Now on p o  Bicarb and off bicarb infusion  · Spironolactone added due to hypokalemia  Consider discontinuing spironolactone if creatinine continues to trend up  · Avoid nephrotoxins as able, renally dose medications as indicated    1/1-appreciate Nephrology recommendations, have signed off and patient is stable for discharge  * Acute on chronic diastolic congestive heart failure (HCC)  Assessment & Plan  · ProBNP 20,640  2+ edema in lower extremity on admission  · Venous Doppler negative for DVT  · 2D echo - normal EF,G1DD,no RWMA, moderate aortic regurgitation, trace pericardial effusion  · Diuretics per Nephrology, received course of IV Lasix with albumin  Has been switched to PO torsemide  Continue Aldactone  · Continue metoprolol  · Daily weight and I&Os  VTE Pharmacologic Prophylaxis:   Pharmacologic: Held secondary to ecchymosis at injection site  Mechanical VTE Prophylaxis in Place: Yes    Patient Centered Rounds: I have performed bedside rounds with nursing staff today  Discussions with Specialists or Other Care Team Provider:  None    Education and Discussions with Family / Patient: Care plan discussed with patient who voiced understanding and agrees with recommendations  Time Spent for Care: 30 minutes  More than 50% of total time spent on counseling and coordination of care as described above  Current Length of Stay: 22 day(s)    Current Patient Status: Inpatient   Certification Statement: The patient will continue to require additional inpatient hospital stay due to Awaiting placement    Discharge Plan: To be determined    Code Status: Level 1 - Full Code      Subjective:   Patient seen examined bedside, reports that she is feeling much better than previously although does endorse mild chills    States that swelling in the right lower extremity has improved, no longer has diarrhea or constipation with 2-3 solid bowel movements today  Additionally awaiting discharge to follow on rehab facility  Will discuss further with case management tomorrow, from Infectious Disease and Nephrology standpoint, patient is stable for discharge now  Monitor labs and discharge when bed available  Objective:     Vitals:   Temp (24hrs), Av 6 °F (37 °C), Min:98 4 °F (36 9 °C), Max:98 8 °F (37 1 °C)    Temp:  [98 4 °F (36 9 °C)-98 8 °F (37 1 °C)] 98 8 °F (37 1 °C)  HR:  [67-73] 73  Resp:  [18] 18  BP: (171-185)/(62-72) 178/67  Body mass index is 33 72 kg/m²  Input and Output Summary (last 24 hours): Intake/Output Summary (Last 24 hours) at 2020 1740  Last data filed at 2020 1100  Gross per 24 hour   Intake 358 ml   Output 800 ml   Net -442 ml       Physical Exam:     Physical Exam   Constitutional: She is oriented to person, place, and time  She appears well-developed and well-nourished  No distress  HENT:   Head: Normocephalic and atraumatic  Nose: Nose normal    Mouth/Throat: Oropharynx is clear and moist  No oropharyngeal exudate  Eyes: Pupils are equal, round, and reactive to light  Conjunctivae and EOM are normal  Right eye exhibits no discharge  Left eye exhibits no discharge  No scleral icterus  Neck: Normal range of motion  No JVD present  No tracheal deviation present  Cardiovascular: Normal rate and regular rhythm  Exam reveals no gallop and no friction rub  Murmur heard  Pulmonary/Chest: Effort normal and breath sounds normal  No stridor  No respiratory distress  She has no wheezes  She has no rales  Abdominal: Soft  She exhibits no distension and no mass  There is no tenderness  There is no rebound and no guarding  Multiple well-healed surgical scars   Musculoskeletal: She exhibits edema  She exhibits no tenderness or deformity  Right lower extremity   Neurological: She is alert and oriented to person, place, and time  Skin: Skin is warm and dry  She is not diaphoretic  Psychiatric: She has a normal mood and affect  Her behavior is normal  Judgment normal    Nursing note and vitals reviewed  Additional Data:     Labs:    Results from last 7 days   Lab Units 01/01/20  0533   WBC Thousand/uL 6 77   HEMOGLOBIN g/dL 7 8*   HEMATOCRIT % 25 3*   PLATELETS Thousands/uL 172   NEUTROS PCT % 57   LYMPHS PCT % 24   MONOS PCT % 9   EOS PCT % 8*     Results from last 7 days   Lab Units 01/01/20  0533  12/27/19  0617   SODIUM mmol/L 140   < > 141   POTASSIUM mmol/L 4 6   < > 4 1   CHLORIDE mmol/L 116*   < > 112*   CO2 mmol/L 17*   < > 20*   BUN mg/dL 38*   < > 39*   CREATININE mg/dL 2 57*   < > 2 83*   ANION GAP mmol/L 7   < > 9   CALCIUM mg/dL 8 4   < > 8 5   ALBUMIN g/dL  --   --  2 7*   TOTAL BILIRUBIN mg/dL  --   --  0 34   ALK PHOS U/L  --   --  89   ALT U/L  --   --  13   AST U/L  --   --  9   GLUCOSE RANDOM mg/dL 101   < > 108    < > = values in this interval not displayed  Results from last 7 days   Lab Units 01/01/20  1603 01/01/20  1055 01/01/20  0648 12/31/19  2107 12/31/19  1603 12/31/19  1114 12/31/19  0615 12/30/19  2119 12/30/19  1615 12/30/19  1056 12/30/19  0732 12/29/19  2055   POC GLUCOSE mg/dl 131 89 116 112 145* 121 110 108 132 100 128 105                   * I Have Reviewed All Lab Data Listed Above  * Additional Pertinent Lab Tests Reviewed:  All Labs Within Last 24 Hours Reviewed    Imaging:    Imaging Reports Reviewed Today Include:  None  Imaging Personally Reviewed by Myself Includes:  None    Recent Cultures (last 7 days):           Last 24 Hours Medication List:     Current Facility-Administered Medications:  acetaminophen 650 mg Oral Q6H PRN Kourtney Ortiz PA-C   amLODIPine 10 mg Oral Daily MER Killian   ARIPiprazole 30 mg Oral HS Allen Brenna, DO   aspirin 81 mg Oral Daily Allen Brenna, DO   busPIRone 5 mg Oral BID Allen Brenna, DO   cholecalciferol 3,000 Units Oral Daily Allencrystal Ceja, DO   cloNIDine 0 2 mg Oral Counts include 234 beds at the [facility] Muriel Leavitt, MD   doxazosin 2 mg Oral HS Yumi Fragoso MD   DULoxetine 60 mg Oral BID Narvis More, DO   ferrous sulfate 325 mg Oral Daily With Breakfast Narvis More, DO   folic acid 4,404 mcg Oral Daily Narvis More, DO   hydrALAZINE 5 mg Intravenous Q6H PRN Hector Higginbotham MD   hydrALAZINE 75 mg Oral Q8H [address] Maile Styles MD   insulin lispro 1-5 Units Subcutaneous HS Narvis More, DO   insulin lispro 1-6 Units Subcutaneous TID AC Hector Higginbotham MD   levothyroxine 125 mcg Oral Early Morning Narvis More, DO   LORazepam 2 mg Oral TID PRN Narvis More, DO   metoprolol tartrate 50 mg Oral Q12H [address] Narvis More, DO   ondansetron 4 mg Intravenous Q4H PRN Rocky Sargent MD   polyethylene glycol 34 g Oral Daily Chacho Ramey MD   pravastatin 80 mg Oral Daily Narvis More, DO   predniSONE 5 mg Oral Daily Narvis More, DO   rOPINIRole 0 25 mg Oral BID Narvis More, DO   saccharomyces boulardii 250 mg Oral BID Narvis More, DO   sertraline 200 mg Oral Daily Narvis More, DO   sevelamer 1,600 mg Oral TID With Meals MER Campos   sodium bicarbonate 1,300 mg Oral TID after meals Tamy Li, DO   spironolactone 25 mg Oral Daily Cody Sharp MD   tacrolimus 2 mg Oral Daily Seamus Fu, DO   tacrolimus 2 mg Oral HS Bill Dillard MD   torsemide 20 mg Oral Daily Tamy Sanchez DO        Today, Patient Was Seen By: Dakota Hutson MD    ** Please Note: Dictation voice to text software may have been used in the creation of this document   **

## 2020-01-01 NOTE — ASSESSMENT & PLAN NOTE
· Appears acute on chronic  · Bicarb 17 today  · S/P kidney and pancreatic transplant in 1998  · Continue p o  Sodium bicarb and trend BMP  · Patient with an RTA related to her pancreatic transplant per Nephrology  · P o  Bicarb supplementation as per Nephrology, appreciate input  1/1-nephrology increased bicarbonate 1300 t i d , monitor on morning labs

## 2020-01-01 NOTE — PLAN OF CARE
Problem: Potential for Falls  Goal: Patient will remain free of falls  Description  INTERVENTIONS:  - Assess patient frequently for physical needs  -  Identify cognitive and physical deficits and behaviors that affect risk of falls    -  Home fall precautions as indicated by assessment   - Educate patient/family on patient safety including physical limitations  - Instruct patient to call for assistance with activity based on assessment  - Modify environment to reduce risk of injury  - Consider OT/PT consult to assist with strengthening/mobility  Outcome: Progressing

## 2020-01-01 NOTE — ASSESSMENT & PLAN NOTE
With history of recurrent UTIs in setting of renal and pancreatic transplant  Most recently with VRE at recent admission to [facility]  Presenting with abdominal pain/nausea and lethargy/confusion  · CT abdomen/pelvis on admission - bowel wall thickening involving the proximal to mid sigmoid and the distal left colon  · Consulted GI -  Do not suspect colitis  Likely due to constipation/fecal stasis  Abdominal pain improved with MiraLax  GI recommend starting MiraLax daily  Outpatient colonoscopy  Follow-up GI in 1 month  GI signed off    · Stool cdiff neg x2, enteric panel neg, leucocytes neg  · CMV PCR - negative

## 2020-01-01 NOTE — ASSESSMENT & PLAN NOTE
· On chronic immunosuppression with tacrolimus and prednisone  · Unfortunately patient noncompliant with all medications since discharge including these  · Tacrolimus level was 4 > 10 > 7 (12/12) > 5 (12/14) remains stable at 4 4  · Dose decreased to 2 BID on 12/12, again decrease to 2 mg QAM & 1 5 mg HS from 12/14  · Tacrolimus level was stable on the current dosing  · Last level 4 5    1/1-currently patient on 2 mg tacrolimus b i d

## 2020-01-01 NOTE — ASSESSMENT & PLAN NOTE
· Blood pressures have remained variable  Patient currently on clonidine 0 2 mg TID, hydralazine 50 mg Q8, Cardura 20 mg HS, metoprolol tartrate 50 mg BID, Amlodipine 10 mg daily, spironolactone 25 mg daily and torsemide 20 mg daily  1/1-blood pressure is noted to be elevated- 170's-180's; increased hydralazine to 75 mg t i d

## 2020-01-01 NOTE — TREATMENT PLAN
Ready for discharge from renal perspective  Renal function at baseline  Patient does have history of renal transplant  Should have outpatient follow-up Dr Radha Alvarenga upon discharge  Nephrology will sign off

## 2020-01-01 NOTE — ASSESSMENT & PLAN NOTE
Lab Results   Component Value Date    HGBA1C 5 1 09/09/2019     Recent Labs     12/31/19  2107 01/01/20  0648 01/01/20  1055 01/01/20  1603   POCGLU 112 116 89 131     Blood Sugar Average: Last 72 hrs:  · Stopped Lantus (in place of Toujeo) 5 units qHS due to hypoglycemia  · Cont SSI with accu-cheks and carb controlled diet

## 2020-01-01 NOTE — ASSESSMENT & PLAN NOTE
· Follows with Dr Elyn Kanner as outpatient  · Currently off Revlimid  · Hemoglobin stable between 7-8  · Known history of transfusions in past   Has multiple autoantibodies

## 2020-01-02 ENCOUNTER — TRANSITIONAL CARE MANAGEMENT (OUTPATIENT)
Dept: FAMILY MEDICINE CLINIC | Facility: CLINIC | Age: 55
End: 2020-01-02

## 2020-01-02 VITALS
HEART RATE: 72 BPM | DIASTOLIC BLOOD PRESSURE: 86 MMHG | SYSTOLIC BLOOD PRESSURE: 213 MMHG | RESPIRATION RATE: 17 BRPM | HEIGHT: 68 IN | OXYGEN SATURATION: 95 % | WEIGHT: 218.2 LBS | TEMPERATURE: 98.5 F | BODY MASS INDEX: 33.07 KG/M2

## 2020-01-02 LAB
ALBUMIN SERPL BCP-MCNC: 3 G/DL (ref 3.5–5)
ALP SERPL-CCNC: 86 U/L (ref 46–116)
ALT SERPL W P-5'-P-CCNC: 14 U/L (ref 12–78)
ANION GAP SERPL CALCULATED.3IONS-SCNC: 6 MMOL/L (ref 4–13)
AST SERPL W P-5'-P-CCNC: 9 U/L (ref 5–45)
BASOPHILS # BLD AUTO: 0.05 THOUSANDS/ΜL (ref 0–0.1)
BASOPHILS NFR BLD AUTO: 1 % (ref 0–1)
BILIRUB SERPL-MCNC: 0.4 MG/DL (ref 0.2–1)
BUN SERPL-MCNC: 37 MG/DL (ref 5–25)
CALCIUM SERPL-MCNC: 8.9 MG/DL (ref 8.3–10.1)
CHLORIDE SERPL-SCNC: 115 MMOL/L (ref 100–108)
CO2 SERPL-SCNC: 18 MMOL/L (ref 21–32)
CREAT SERPL-MCNC: 2.69 MG/DL (ref 0.6–1.3)
EOSINOPHIL # BLD AUTO: 0.42 THOUSAND/ΜL (ref 0–0.61)
EOSINOPHIL NFR BLD AUTO: 6 % (ref 0–6)
ERYTHROCYTE [DISTWIDTH] IN BLOOD BY AUTOMATED COUNT: 16.6 % (ref 11.6–15.1)
GFR SERPL CREATININE-BSD FRML MDRD: 19 ML/MIN/1.73SQ M
GLUCOSE SERPL-MCNC: 107 MG/DL (ref 65–140)
GLUCOSE SERPL-MCNC: 124 MG/DL (ref 65–140)
GLUCOSE SERPL-MCNC: 92 MG/DL (ref 65–140)
GLUCOSE SERPL-MCNC: 98 MG/DL (ref 65–140)
HCT VFR BLD AUTO: 26.3 % (ref 34.8–46.1)
HGB BLD-MCNC: 7.9 G/DL (ref 11.5–15.4)
IMM GRANULOCYTES # BLD AUTO: 0.04 THOUSAND/UL (ref 0–0.2)
IMM GRANULOCYTES NFR BLD AUTO: 1 % (ref 0–2)
LYMPHOCYTES # BLD AUTO: 1.94 THOUSANDS/ΜL (ref 0.6–4.47)
LYMPHOCYTES NFR BLD AUTO: 29 % (ref 14–44)
MAGNESIUM SERPL-MCNC: 2.8 MG/DL (ref 1.6–2.6)
MCH RBC QN AUTO: 32.1 PG (ref 26.8–34.3)
MCHC RBC AUTO-ENTMCNC: 30 G/DL (ref 31.4–37.4)
MCV RBC AUTO: 107 FL (ref 82–98)
MONOCYTES # BLD AUTO: 0.59 THOUSAND/ΜL (ref 0.17–1.22)
MONOCYTES NFR BLD AUTO: 9 % (ref 4–12)
NEUTROPHILS # BLD AUTO: 3.56 THOUSANDS/ΜL (ref 1.85–7.62)
NEUTS SEG NFR BLD AUTO: 54 % (ref 43–75)
NRBC BLD AUTO-RTO: 0 /100 WBCS
PHOSPHATE SERPL-MCNC: 5.6 MG/DL (ref 2.7–4.5)
PLATELET # BLD AUTO: 181 THOUSANDS/UL (ref 149–390)
PMV BLD AUTO: 12.7 FL (ref 8.9–12.7)
POTASSIUM SERPL-SCNC: 4.6 MMOL/L (ref 3.5–5.3)
PROT SERPL-MCNC: 7.2 G/DL (ref 6.4–8.2)
RBC # BLD AUTO: 2.46 MILLION/UL (ref 3.81–5.12)
SODIUM SERPL-SCNC: 139 MMOL/L (ref 136–145)
WBC # BLD AUTO: 6.6 THOUSAND/UL (ref 4.31–10.16)

## 2020-01-02 PROCEDURE — 82948 REAGENT STRIP/BLOOD GLUCOSE: CPT

## 2020-01-02 PROCEDURE — 99239 HOSP IP/OBS DSCHRG MGMT >30: CPT | Performed by: INTERNAL MEDICINE

## 2020-01-02 PROCEDURE — 84100 ASSAY OF PHOSPHORUS: CPT | Performed by: INTERNAL MEDICINE

## 2020-01-02 PROCEDURE — 97168 OT RE-EVAL EST PLAN CARE: CPT

## 2020-01-02 PROCEDURE — 85025 COMPLETE CBC W/AUTO DIFF WBC: CPT | Performed by: INTERNAL MEDICINE

## 2020-01-02 PROCEDURE — 80053 COMPREHEN METABOLIC PANEL: CPT | Performed by: INTERNAL MEDICINE

## 2020-01-02 PROCEDURE — 83735 ASSAY OF MAGNESIUM: CPT | Performed by: INTERNAL MEDICINE

## 2020-01-02 RX ORDER — DOXAZOSIN 2 MG/1
2 TABLET ORAL
Qty: 30 TABLET | Refills: 0 | Status: SHIPPED | OUTPATIENT
Start: 2020-01-02 | End: 2020-02-05 | Stop reason: SDUPTHER

## 2020-01-02 RX ORDER — TACROLIMUS 1 MG/1
2 CAPSULE ORAL EVERY 12 HOURS SCHEDULED
Qty: 120 CAPSULE | Refills: 0 | Status: SHIPPED | OUTPATIENT
Start: 2020-01-02 | End: 2020-02-24 | Stop reason: SDUPTHER

## 2020-01-02 RX ORDER — CLONIDINE HYDROCHLORIDE 0.2 MG/1
0.2 TABLET ORAL EVERY 8 HOURS SCHEDULED
Qty: 90 TABLET | Refills: 0 | Status: SHIPPED | OUTPATIENT
Start: 2020-01-02 | End: 2020-01-01 | Stop reason: SDUPTHER

## 2020-01-02 RX ORDER — SEVELAMER HYDROCHLORIDE 800 MG/1
1600 TABLET, FILM COATED ORAL
Qty: 90 TABLET | Refills: 1 | Status: SHIPPED | OUTPATIENT
Start: 2020-01-02 | End: 2020-01-01 | Stop reason: SDUPTHER

## 2020-01-02 RX ORDER — TORSEMIDE 20 MG/1
20 TABLET ORAL DAILY
Qty: 30 TABLET | Refills: 0 | Status: SHIPPED | OUTPATIENT
Start: 2020-01-03 | End: 2020-02-05 | Stop reason: SDUPTHER

## 2020-01-02 RX ORDER — SPIRONOLACTONE 25 MG/1
25 TABLET ORAL DAILY
Qty: 30 TABLET | Refills: 0 | Status: SHIPPED | OUTPATIENT
Start: 2020-01-03 | End: 2020-01-23 | Stop reason: HOSPADM

## 2020-01-02 RX ADMIN — LEVOTHYROXINE SODIUM 125 MCG: 125 TABLET ORAL at 06:17

## 2020-01-02 RX ADMIN — SODIUM BICARBONATE 650 MG TABLET 1300 MG: at 12:39

## 2020-01-02 RX ADMIN — DULOXETINE HYDROCHLORIDE 60 MG: 60 CAPSULE, DELAYED RELEASE ORAL at 08:49

## 2020-01-02 RX ADMIN — CLONIDINE HYDROCHLORIDE 0.2 MG: 0.1 TABLET ORAL at 06:17

## 2020-01-02 RX ADMIN — BUSPIRONE HYDROCHLORIDE 5 MG: 5 TABLET ORAL at 08:52

## 2020-01-02 RX ADMIN — AMLODIPINE BESYLATE 10 MG: 10 TABLET ORAL at 08:49

## 2020-01-02 RX ADMIN — ROPINIROLE 0.25 MG: 0.25 TABLET, FILM COATED ORAL at 08:49

## 2020-01-02 RX ADMIN — POLYETHYLENE GLYCOL 3350 34 G: 17 POWDER, FOR SOLUTION ORAL at 08:49

## 2020-01-02 RX ADMIN — ASPIRIN 81 MG 81 MG: 81 TABLET ORAL at 08:50

## 2020-01-02 RX ADMIN — SPIRONOLACTONE 25 MG: 25 TABLET ORAL at 08:50

## 2020-01-02 RX ADMIN — HYDRALAZINE HYDROCHLORIDE 75 MG: 25 TABLET ORAL at 15:39

## 2020-01-02 RX ADMIN — METOPROLOL TARTRATE 50 MG: 50 TABLET, FILM COATED ORAL at 08:50

## 2020-01-02 RX ADMIN — SEVELAMER HYDROCHLORIDE 1600 MG: 800 TABLET, FILM COATED PARENTERAL at 12:39

## 2020-01-02 RX ADMIN — TORSEMIDE 20 MG: 20 TABLET ORAL at 08:50

## 2020-01-02 RX ADMIN — SERTRALINE HYDROCHLORIDE 200 MG: 100 TABLET ORAL at 08:49

## 2020-01-02 RX ADMIN — MELATONIN 3000 UNITS: at 08:50

## 2020-01-02 RX ADMIN — PREDNISONE 5 MG: 5 TABLET ORAL at 08:49

## 2020-01-02 RX ADMIN — SODIUM BICARBONATE 650 MG TABLET 1300 MG: at 08:50

## 2020-01-02 RX ADMIN — Medication 250 MG: at 08:49

## 2020-01-02 RX ADMIN — TACROLIMUS 2 MG: 1 CAPSULE ORAL at 08:53

## 2020-01-02 RX ADMIN — FOLIC ACID 1000 MCG: 1 TABLET ORAL at 08:50

## 2020-01-02 RX ADMIN — PRAVASTATIN SODIUM 80 MG: 80 TABLET ORAL at 08:49

## 2020-01-02 RX ADMIN — CLONIDINE HYDROCHLORIDE 0.2 MG: 0.1 TABLET ORAL at 15:39

## 2020-01-02 RX ADMIN — HYDRALAZINE HYDROCHLORIDE 75 MG: 25 TABLET ORAL at 06:17

## 2020-01-02 RX ADMIN — SEVELAMER HYDROCHLORIDE 1600 MG: 800 TABLET, FILM COATED PARENTERAL at 08:50

## 2020-01-02 RX ADMIN — FERROUS SULFATE TAB 325 MG (65 MG ELEMENTAL FE) 325 MG: 325 (65 FE) TAB at 08:54

## 2020-01-02 NOTE — ASSESSMENT & PLAN NOTE
With history of recurrent UTIs in setting of renal and pancreatic transplant  Most recently with VRE at recent admission to [facility]  Presenting with abdominal pain/nausea and lethargy/confusion  · CT abdomen/pelvis on admission - bowel wall thickening involving the proximal to mid sigmoid and the distal left colon  · Consulted GI -  Do not suspect colitis  Likely due to constipation/fecal stasis  Abdominal pain improved with MiraLax  GI recommend starting MiraLax daily  Outpatient colonoscopy  Follow-up GI in 1 month  GI signed off    · Stool cdiff neg x2, enteric panel neg, leucocytes neg  · CMV PCR - negative

## 2020-01-02 NOTE — SOCIAL WORK
Pt's father contacted CM for an update  CM reported to pt's father that beds were unable to take her with her ISO precautions  Pt's father reported that he wanted to know why pt was on precautions for contact when she did not have her UTI  CM sent TT to [facility] provider  CM reported that the search would be expanded to facilities that would review  CM sent TT to provider, Eugenia Jacobson regarding contact precautions  ID was unable to provide an answer  [facility] provider reported he felt she was appropriate to d/c without contact  CM made radius search to facilities with the new provided information  Pt was re-evaluated by OT and recommended for 24 hr supervision  Pt's father doesn't want the pt to have long term care  Pt was electing for her father to pick placement for her  Pt's father contacted CM regarding the d/c plan  CM explained that she spoke with provider about contact precautions and sent referrals to facilities with updated information  CM reported that she was re-evaluated by OT and they recommended 24hr supervision at home  Pt's father did not want pt to go home  CM spoke about LTC and father declined  [facility] care liaison called and spoke to pt's father about LTC versus 24/7 supervision as per CM request  Pt's father expressed that he did not want LTC  [facility] liaison informed pt's father of OT reporting "24/7 supervision due to cognitive deficits"  CM received call from the pt  She reported she wanted to "take matters into my own hands" and she wanted to go home  CM reported the recommendation for 24hr supervision or therapies  Pt reported she does not need assist  CM explained the recommendation and pt still declined  CM received call from pt's father, he was angry at the communication and discussion with the pt  Pt's father shared that he was unsure what it meant by "congitive" concerns   CM reported that early on her admission she received neuropsych eval and she was reported to be competent to make her own choices  Pt's father requested neuropsych eval and CM reported that he could contact medical records for that report  Pt's father reported that he wanted OP therapy and pt discharged home  CM sent TT to provider to add ambulatory referral to pt's d/c summary

## 2020-01-02 NOTE — DISCHARGE SUMMARY
Discharge- Cathye Babinski 1965, 53 y o  female MRN: [id number]    Unit/Bed#: -01 Encounter: [id number]    Primary Care Provider: Ute Echavarria MD   Date and time admitted to hospital: 12/9/2019  8:29 PM        Renal transplant recipient  Assessment & Plan  · On chronic immunosuppression with tacrolimus and prednisone  · Unfortunately patient noncompliant with all medications since discharge including these  · Tacrolimus level was 4 > 10 > 7 (12/12) > 5 (12/14) remains stable at 4 4  · Dose decreased to 2 BID on 12/12, again decrease to 2 mg QAM & 1 5 mg HS from 12/14  · Tacrolimus level was stable on the current dosing  · Last level 4 5    1/1-currently patient on 2 mg tacrolimus b i d  Bacteremia  Assessment & Plan  · 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  · ID has been monitoring off antibiotics  · Pt remains afebrile w/o WBC  · Continue to monitor     Asymptomatic bacteriuria  Assessment & Plan  · Recently completed 7 D course of IV daptomycin for VRE UTI  Monitoring off antibiotic therapy  · ID consulted   · Has positive cultures for VRE again, but this is not the cause of her symptoms, its colonization  · Monitor off Abx      Diarrhea  Assessment & Plan  · Likely secondary to constipation/fecal stasis  · Stool culture, C diff negative  · Obstruction series (12/27/19): Nonobstructive bowel gas pattern with decreased colonic stool burden  · Started on MiraLax 34 g daily per GI  Now with 5-6 episodes of loose stools per day  · Will discontinue Miralax for now  Once loose stools have resolved, would resume 17 g/day  Metabolic acidosis  Assessment & Plan  · Appears acute on chronic  · Bicarb 17 today  · S/P kidney and pancreatic transplant in 1998  · Continue p o  Sodium bicarb and trend BMP  · Patient with an RTA related to her pancreatic transplant per Nephrology  · P o   Bicarb supplementation as per Nephrology, appreciate input  1/1-nephrology increased bicarbonate 1300 t i d , monitor on morning labs  Multiple myeloma not having achieved remission [facility])  Assessment & Plan  · Follows with Dr Reji Lauren as outpatient  · Currently off Revlimid  · Hemoglobin stable between 7-8  · Known history of transfusions in past   Has multiple autoantibodies  Essential hypertension  Assessment & Plan  · Blood pressures have remained variable  Patient currently on clonidine 0 2 mg TID, hydralazine 50 mg Q8, Cardura 20 mg HS, metoprolol tartrate 50 mg BID, Amlodipine 10 mg daily, spironolactone 25 mg daily and torsemide 20 mg daily  1/1-blood pressure is noted to be elevated- 170's-180's; increased hydralazine to 75 mg t i d     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 [facility])  Assessment & Plan  Lab Results   Component Value Date    HGBA1C 5 1 09/09/2019     Recent Labs     01/01/20  1603 01/01/20  2124 01/02/20  0634 01/02/20  1137   POCGLU 131 101 92 107     Blood Sugar Average: Last 72 hrs:  · Stopped Lantus (in place of Toujeo) 5 units qHS due to hypoglycemia  · Cont SSI with accu-cheks and carb controlled diet    Colitis  Assessment & Plan  With history of recurrent UTIs in setting of renal and pancreatic transplant  Most recently with VRE at recent admission to [facility]  Presenting with abdominal pain/nausea and lethargy/confusion  · CT abdomen/pelvis on admission - bowel wall thickening involving the proximal to mid sigmoid and the distal left colon  · Consulted GI -  Do not suspect colitis  Likely due to constipation/fecal stasis  Abdominal pain improved with MiraLax  GI recommend starting MiraLax daily  Outpatient colonoscopy  Follow-up GI in 1 month  GI signed off  · Stool cdiff neg x2, enteric panel neg, leucocytes neg  · CMV PCR - negative    Chronic kidney disease, stage 4 (severe) (HCC)  Assessment & Plan  · Baseline creatinine 2 5-3    · Creatinine today: 2 57  · Nephrology following and appreciate their input  · Diuretics managed by Nephrology  · Now on p o  Bicarb and off bicarb infusion  · Spironolactone added due to hypokalemia  Consider discontinuing spironolactone if creatinine continues to trend up  · Avoid nephrotoxins as able, renally dose medications as indicated    1/1-appreciate Nephrology recommendations, have signed off and patient is stable for discharge  * Acute on chronic diastolic congestive heart failure (HCC)  Assessment & Plan  · ProBNP 20,640  2+ edema in lower extremity on admission  · Venous Doppler negative for DVT  · 2D echo - normal EF,G1DD,no RWMA, moderate aortic regurgitation, trace pericardial effusion  · Diuretics per Nephrology, received course of IV Lasix with albumin  Has been switched to PO torsemide  Continue Aldactone  · Continue metoprolol  · Daily weight and I&Os  1/2-patient with 19 lb weight loss in the last 2 weeks, lungs clear to auscultation bilaterally; denies shortness of breath or dyspnea on exertion  Stable for discharge, will need close follow-up with primary team       Discharging Physician / Practitioner: Akash Armando MD  PCP: Alisa Martinez MD  Admission Date:   Admission Orders (From admission, onward)     Ordered        12/10/19 0156  Inpatient Admission  Once                   Discharge Date: 01/02/20    Resolved Problems  Date Reviewed: 1/1/2020          Resolved    Hypokalemia 12/25/2019     Resolved by  Remy Pelaez MD    Acute metabolic encephalopathy 64/13/4216     Resolved by  Remy Pelaez MD          Consultations During Hospital Stay:  · Nephrology, infectious disease, neuropsych, gastroenterology    Procedures Performed:   · Ultrasound lower limb, CT abdomen pelvis    Significant Findings / Test Results:   · VRE bacteremia, congestive heart failure    Incidental Findings:   · Metabolic acidosis    Test Results Pending at Discharge (will require follow up):    · None     Outpatient Tests Requested:  · CMP    Complications:  None    Reason for Admission:  Abdominal pain in the setting of CHF    Hospital Course:       Please see above list of diagnoses and related plan for additional information  Condition at Discharge: stable     Discharge Day Visit / Exam:     Subjective:  Patient seen examined bedside, no acute distress or discomfort noted  Long conversation had with patient and her father at bedside regarding discharge and follow on options  Recommendations by OT word for inpatient rehab, at this point however, patient and family does not wish to have inpatient rehabilitation  Opting for outpatient rehab at this point  Signed off by Nephrology, blood pressure not at goal but relatively well controlled with current medications  Will need close follow-up as outpatient  VRE bacteriuria treated with 7 day course, likely patient has chronic colonizer  Over 15 lb weight loss in 2 weeks, will continue diuresis  No signs of heart failure at this point, patient is stable for discharge home  Vitals: Blood Pressure: (!) 213/86(map-124) (01/02/20 1539)  Pulse: 72(manual ) (01/02/20 0850)  Temperature: 98 5 °F (36 9 °C) (01/02/20 0744)  Temp Source: Oral (12/31/19 0820)  Respirations: 17 (01/02/20 0744)  Height: 5' 8" (172 7 cm) (12/09/19 1940)  Weight - Scale: 99 kg (218 lb 3 2 oz) (01/02/20 0600)  SpO2: 95 % (12/31/19 0820)  Exam:   Physical Exam   Constitutional: She is oriented to person, place, and time  She appears well-developed and well-nourished  No distress  HENT:   Head: Normocephalic and atraumatic  Nose: Nose normal    Mouth/Throat: Oropharynx is clear and moist  No oropharyngeal exudate  Eyes: Pupils are equal, round, and reactive to light  Conjunctivae and EOM are normal  Right eye exhibits no discharge  Left eye exhibits no discharge  No scleral icterus  Neck: Normal range of motion  No JVD present  No tracheal deviation present     Cardiovascular: Normal rate and regular rhythm  Exam reveals no gallop and no friction rub  Murmur heard  Pulmonary/Chest: Effort normal and breath sounds normal  No stridor  No respiratory distress  She has no wheezes  She has no rales  Abdominal: Soft  She exhibits no distension and no mass  There is no tenderness  There is no rebound and no guarding  Musculoskeletal: Normal range of motion  She exhibits no edema, tenderness or deformity  Neurological: She is alert and oriented to person, place, and time  Skin: She is not diaphoretic  Ecchymosis on bilateral abdominal wall and upper extremities   Psychiatric: She has a normal mood and affect  Her behavior is normal  Judgment normal    Nursing note and vitals reviewed  Discussion with Family: Discussed treatment plan with family and patient who agree with current plan; encouraged to ask questions and participate  Discharge instructions/Information to patient and family:   See after visit summary for information provided to patient and family  Provisions for Follow-Up Care:  See after visit summary for information related to follow-up care and any pertinent home health orders  Disposition:     Home    For Discharges to [facility]:   · Not Applicable to this Patient - Not Applicable to this Patient    Planned Readmission:  None     Discharge Statement:  I spent 35 minutes discharging the patient  This time was spent on the day of discharge  I had direct contact with the patient on the day of discharge  Greater than 50% of the total time was spent examining patient, answering all patient questions, arranging and discussing plan of care with patient as well as directly providing post-discharge instructions  Additional time then spent on discharge activities  Discharge Medications:  See after visit summary for reconciled discharge medications provided to patient and family        ** Please Note: This note has been constructed using a voice recognition system **

## 2020-01-02 NOTE — ASSESSMENT & PLAN NOTE
· ProBNP 20,640  2+ edema in lower extremity on admission  · Venous Doppler negative for DVT  · 2D echo - normal EF,G1DD,no RWMA, moderate aortic regurgitation, trace pericardial effusion  · Diuretics per Nephrology, received course of IV Lasix with albumin  Has been switched to PO torsemide  Continue Aldactone  · Continue metoprolol  · Daily weight and I&Os  1/2-patient with 19 lb weight loss in the last 2 weeks, lungs clear to auscultation bilaterally; denies shortness of breath or dyspnea on exertion    Stable for discharge, will need close follow-up with primary team

## 2020-01-02 NOTE — OCCUPATIONAL THERAPY NOTE
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 ([facility] )    Essential hypertension    Multiple myeloma not having achieved remission ([facility] )    Metabolic acidosis    Diarrhea    Asymptomatic bacteriuria    Bacteremia    Hyperphosphatemia    Past Medical History  Past Medical History:   Diagnosis Date    Abnormal liver function test     Acute kidney injury ([facility] )     Acute on chronic congestive heart failure (HCC)     Allergic urticaria     Anemia     Cancer (HCC)     Multiple myeloma    Cervical dysplasia     Cholelithiasis     Chronic diastolic (congestive) heart failure ([facility] ) 9/18/2017    Diabetes mellitus (HCC)     Previous, controlled with diet    Diabetes mellitus with foot ulcer ([facility] )     Disease of thyroid gland     Encephalopathy     Hematuria     + leak est- secondary to UTIs/panc drainage    History of transfusion     Hyperkalemia     Hypertension     Iliotibial band syndrome     Lumbar radiculopathy     Multiple myeloma (HCC)     Multiple myeloma (HCC)     Night blindness     Nonrheumatic aortic (valve) insufficiency     Pneumonia     Renal disorder     Retinopathy     Seborrhea     Seizure ([facility] )     Shingles     Sinus tachycardia     B blocker - cardio echo stress test 02 normal/neg LE doppler 2/02 OK and 12/07    Status post simultaneous kidney and pancreas transplant ([facility] )     Toe amputation status     Trochanteric bursitis      Past Surgical History  Past Surgical History:   Procedure Laterality Date    CATARACT EXTRACTION      CHOLECYSTECTOMY      COLONOSCOPY      two polyps in the rectum removed and biopsied diverticulosis in the sigmoid colon, external hemorrhoiods- Dr Barfield    COMBINED KIDNEY-PANCREAS TRANSPLANT N/A     CT BONE MARROW BIOPSY AND ASPIRATION  4/17/2019    CYSTOSCOPY N/A 10/13/2016    Procedure: CYSTOSCOPY, retrograde pyelogram, biopsy of ureteral polyp; Surgeon: Brittney Guadalupe MD;  Location: [facility];  Service:    Cherellearia Reil DILATION AND CURETTAGE OF UTERUS      ESOPHAGOGASTRODUODENOSCOPY N/A 11/20/2017    Procedure: ESOPHAGOGASTRODUODENOSCOPY (EGD); Surgeon: Aidan Daley DO;  Location: [facility]; Service: Gastroenterology    EYE SURGERY      cataracts    FOOT AMPUTATION THROUGH METATARSAL Left     FOOT SURGERY Right     excision of metatarsal heads    HALLUX VALGUS CORRECTION Right     NEPHRECTOMY TRANSPLANTED ORGAN      PANCREATIC TRANSPLANT REMOVAL  1998 01/02/20 0854   Note Type   Note type Eval only   Restrictions/Precautions   Weight Bearing Precautions Per Order No   Other Precautions Cognitive; Fall Risk   Pain Assessment   Pain Assessment No/denies pain   Pain Score No Pain   Home Living   Additional Comments see initial OT evaluation   Prior Function   Lives With Alone   Comments see initial OT evaluation   Lifestyle   Autonomy Pt reports being I with ADLS, IADLS and use of cane for ambulation PTA    Reciprocal Relationships Pt lives alone and reports that her family can assist as needed upon d/c    Service to Others Pt reports on disability   Intrinsic Gratification Pt reports enjoying crafts especially making jewelry and rugs   Psychosocial   Psychosocial (WDL) WDL   Patient Behaviors/Mood Cooperative;Flat affect   Subjective   Subjective "I could do all of this by myself"- pt while completing ADLs standing at the sink   ADL   Eating Assistance 7  Independent   Grooming Assistance 7  Independent   UB Bathing Assistance 5  Supervision/Setup   LB Bathing Assistance 5  Supervision/Setup   UB Dressing Assistance 5  Supervision/Setup   UB Dressing Deficit   (doff/don gown)   LB Dressing Assistance 5  Supervision/Setup   LB Dressing Deficit Don/doff R sock; Don/doff L sock   Toileting Assistance  5  Supervision/Setup   Additional Comments Pt completed oral care and hair care while standing at the sink with rw I'ly  Pt doffs/dons b/l socks with supervision  Pt requesting not to don pants or shoes due to LE swelling at this time  Bed Mobility   Additional Comments Pt seated in recliner upon therapist entry and bed mobility not assessed at this time   Transfers   Sit to Stand 6  Modified independent   Stand to Sit 5  Supervision   Additional Comments Pt requires cues for safe hand placement during functional transfers with use of rw  Functional Mobility   Functional Mobility 5  Supervision   Additional Comments Pt ambulates over 200 feet with use of rw and no LOB at Lavell level  Pt is able to avoid large obstacles without cues, but requires supervision in smaller/more congested areas  Balance   Static Sitting Good   Dynamic Sitting Fair +   Static Standing Fair +   Dynamic Standing Fair +   Ambulatory Fair +   Activity Tolerance   Activity Tolerance Patient tolerated treatment well   RUE Assessment   RUE Assessment WFL   LUE Assessment   LUE Assessment WFL   Hand Function   Gross Motor Coordination Functional   Vision-Basic Assessment   Current Vision Wears glasses only for reading   Vision - Complex Assessment   Ocular Range of Motion WFL   Head Position WDL   Tracking Able to track stimulus in all quads without difficulty   Perception   Inattention/Neglect Appears intact   Cognition   Overall Cognitive Status Impaired   Arousal/Participation Alert; Cooperative;Responsive   Attention Attends with cues to redirect   Orientation Level Oriented X4   Memory Decreased recall of precautions;Decreased short term memory   Following Commands Follows one step commands with increased time or repetition   Comments Pt is pleasant and cooperative  Flat affect, limited safety awareness, poor insight into deficits    Limited carry over of education noted throughout previous OT POC  Pt recently completed the ACLS (during this admission) and score implied that pt requires 24/7 supervision/assistance, shouldn't drive, and should have assist with medications for safety  Assessment   Prognosis Fair   Assessment Pt is a 53year old female seen for OT re-evaluation 1/2/20  Pt was recently d/c from acute OT services with recommendation for increased assistance with IADLs and 24/7 supervision due to cognitive deficits  Active problems include: renal transplant recipient, bacteremia, asymptomatic bacteriuria, diarrhea, metabolic acidosis, multiple myeloma, HTN, DM, colitis, CKD, and congestive heart failure  Pt with active OT orders and cleared by MICHELLE Robin for OT evaluation and OOB mobility  See initial OT evaluation for home set-up and PLOF  Pt is currently functioning at a Lavell-supervision level for ADLs and functional mobility with use of rw  Pt doesn't require continued acute OT services, and OT to continue to recommend 24/7 supervision and increased assistance with IADLs  If family is unable to provide this level of support, consider placement at a facility that is able to provide 24/7 supervision/assist   OT orders to be d/c     Goals   Patient Goals to go to rehab    Recommendation   OT Discharge Recommendation 24 hour supervision/assist   OT - OK to Discharge Yes  (when medically stable)   Barthel Index   Feeding 10   Bathing 0   Grooming Score 5   Dressing Score 10   Bladder Score 5   Bowels Score 5   Toilet Use Score 10   Transfers (Bed/Chair) Score 15   Mobility (Level Surface) Score 10   Stairs Score 0  (not assessed)   Barthel Index Score 70     Lesia Beard MOT, OTR/L

## 2020-01-02 NOTE — RESTORATIVE TECHNICIAN NOTE
Restorative Specialist Mobility Note       Activity: Other (Comment)(Educated/encouraged pt to ambulate with assistance 3-4 x's/day, pt refused 2* states she is being d/c'd and waiting to speak to MD RN aware   Pt callbell, phone/tray within reach )    Desi STONE, Restorative Technician, [employer]

## 2020-01-02 NOTE — ASSESSMENT & PLAN NOTE
Lab Results   Component Value Date    HGBA1C 5 1 09/09/2019     Recent Labs     01/01/20  1603 01/01/20  2124 01/02/20  0634 01/02/20  1137   POCGLU 131 101 92 107     Blood Sugar Average: Last 72 hrs:  · Stopped Lantus (in place of Toujeo) 5 units qHS due to hypoglycemia  · Cont SSI with accu-cheks and carb controlled diet

## 2020-01-03 ENCOUNTER — PATIENT OUTREACH (OUTPATIENT)
Dept: FAMILY MEDICINE CLINIC | Facility: CLINIC | Age: 55
End: 2020-01-03

## 2020-01-03 ENCOUNTER — TELEPHONE (OUTPATIENT)
Dept: NEPHROLOGY | Facility: CLINIC | Age: 55
End: 2020-01-03

## 2020-01-03 ENCOUNTER — EPISODE CHANGES (OUTPATIENT)
Dept: CASE MANAGEMENT | Facility: OTHER | Age: 55
End: 2020-01-03

## 2020-01-03 ENCOUNTER — TELEPHONE (OUTPATIENT)
Dept: HEMATOLOGY ONCOLOGY | Facility: CLINIC | Age: 55
End: 2020-01-03

## 2020-01-03 LAB
EST. AVERAGE GLUCOSE BLD GHB EST-MCNC: 94 MG/DL
HBA1C MFR BLD: 4.9 % (ref 4.2–6.3)

## 2020-01-03 NOTE — UTILIZATION REVIEW
Notification of Discharge  This is a Notification of Discharge from our facility [address]  Please be advised that this patient has been discharge from our facility  Below you will find the admission and discharge date and time including the patients disposition  PRESENTATION DATE: 12/9/2019  8:29 PM  OBS ADMISSION DATE:   IP ADMISSION DATE: 12/10/19 0156   DISCHARGE DATE: 1/2/2020  4:50 PM  DISPOSITION: Home/Self Care Home/Self Care   Admission Orders listed below:  Admission Orders (From admission, onward)     Ordered        12/10/19 0156  Inpatient Admission  Once                   Please contact the UR Department if additional information is required to close this patient's authorization/case  [address] Utilization Review Department  Main: [phone] x carefully listen to the prompts  All voicemails are confidential   [email or web address]  Send all requests for admission clinical reviews, approved or denied determinations and any other requests to dedicated fax number below belonging to the campus where the patient is receiving treatment   List of dedicated fax numbers:  [address] DENIALS (Administrative/Medical Necessity) [phone]   [address] (Maternity/NICU/Pediatrics) [phone]   Reema Mckeon [phone]   Karen Washburnvani [phone]   [address] [place] [address] [phone]   145 [facility] 1525 [facility] [phone]   Rachid Valerioald [phone]   [address] [facility] [address] [facility]  24[address] [facility] 1000 [facility] [phone]

## 2020-01-03 NOTE — TELEPHONE ENCOUNTER
Called and left vm for patient to not restart her medication revlimid until after she shes the doctor

## 2020-01-03 NOTE — TELEPHONE ENCOUNTER
Veena Nobles called to inform us that she was hospitalized for a month was d/c 1/2/2020  Pt was set up with a hospital f/u appt on 1/28/2020 at 11:20am w  Dr Sarah Kirkland at the [facility]  Pt would like to know if she should be start taking REVLIMID 5 mg 1 tablet every other day again due to not taking it for a month during her hospital stay  Please review and give the pt a call back

## 2020-01-03 NOTE — TELEPHONE ENCOUNTER
----- Message from Gail Asencio PA-C sent at 1/2/2020  9:23 AM EST -----  Possibly being d/c today   Please call to schedule hospital follow up appointment in 2 weeks with Dr Zohra Kay or AP

## 2020-01-03 NOTE — TELEPHONE ENCOUNTER
Hello    Can the patient have a CMP, CBC, urinalysis, urine protein creatinine ratio, tacrolimus, phosphorus, magnesium completed next week    I am away, but if there are any questions on the results please let me know  I do not have computer access next week   please call my cell if needed    Thank you    np

## 2020-01-03 NOTE — TELEPHONE ENCOUNTER
Tc spoke with pt explained Dr Alyssa Luna wants to see pt for fu post hospital stay  Appointment made for wed 1/8/20 840am, pt agrees  No other concerns

## 2020-01-03 NOTE — TELEPHONE ENCOUNTER
Patient has been discharged and is scheduled with Karen Canales  Would you like bloodwork done prior to her visit?

## 2020-01-06 DIAGNOSIS — T86.10 RENAL TRANSPLANT DISORDER: Primary | ICD-10-CM

## 2020-01-07 ENCOUNTER — EPISODE CHANGES (OUTPATIENT)
Dept: CASE MANAGEMENT | Facility: HOSPITAL | Age: 55
End: 2020-01-07

## 2020-01-07 ENCOUNTER — HOSPITAL ENCOUNTER (OUTPATIENT)
Dept: INFUSION CENTER | Facility: HOSPITAL | Age: 55
End: 2020-01-07
Attending: INTERNAL MEDICINE

## 2020-01-07 DIAGNOSIS — F33.42 MAJOR DEPRESSIVE DISORDER, RECURRENT EPISODE, IN FULL REMISSION (HCC): Primary | Chronic | ICD-10-CM

## 2020-01-07 DIAGNOSIS — F41.1 GAD (GENERALIZED ANXIETY DISORDER): Chronic | ICD-10-CM

## 2020-01-07 RX ORDER — SERTRALINE HYDROCHLORIDE 100 MG/1
TABLET, FILM COATED ORAL
Qty: 180 TABLET | Refills: 0 | Status: SHIPPED | OUTPATIENT
Start: 2020-01-07 | End: 2020-01-01 | Stop reason: SDUPTHER

## 2020-01-08 ENCOUNTER — TELEPHONE (OUTPATIENT)
Dept: NEPHROLOGY | Facility: CLINIC | Age: 55
End: 2020-01-08

## 2020-01-08 ENCOUNTER — DOCUMENTATION (OUTPATIENT)
Dept: NEPHROLOGY | Facility: CLINIC | Age: 55
End: 2020-01-08

## 2020-01-08 ENCOUNTER — PATIENT OUTREACH (OUTPATIENT)
Dept: FAMILY MEDICINE CLINIC | Facility: CLINIC | Age: 55
End: 2020-01-08

## 2020-01-08 ENCOUNTER — APPOINTMENT (OUTPATIENT)
Dept: LAB | Facility: HOSPITAL | Age: 55
End: 2020-01-08
Attending: INTERNAL MEDICINE
Payer: MEDICARE

## 2020-01-08 ENCOUNTER — OFFICE VISIT (OUTPATIENT)
Dept: HEMATOLOGY ONCOLOGY | Facility: CLINIC | Age: 55
End: 2020-01-08
Payer: MEDICARE

## 2020-01-08 VITALS
HEART RATE: 77 BPM | SYSTOLIC BLOOD PRESSURE: 142 MMHG | RESPIRATION RATE: 14 BRPM | HEIGHT: 67 IN | BODY MASS INDEX: 34.69 KG/M2 | TEMPERATURE: 98 F | OXYGEN SATURATION: 95 % | DIASTOLIC BLOOD PRESSURE: 62 MMHG | WEIGHT: 221 LBS

## 2020-01-08 DIAGNOSIS — C90.00 MULTIPLE MYELOMA NOT HAVING ACHIEVED REMISSION (HCC): Primary | ICD-10-CM

## 2020-01-08 DIAGNOSIS — T86.10 RENAL TRANSPLANT DISORDER: ICD-10-CM

## 2020-01-08 DIAGNOSIS — C90.00 MULTIPLE MYELOMA NOT HAVING ACHIEVED REMISSION (HCC): ICD-10-CM

## 2020-01-08 DIAGNOSIS — T86.10 RENAL TRANSPLANT DISORDER: Primary | ICD-10-CM

## 2020-01-08 PROBLEM — R19.7 DIARRHEA: Status: RESOLVED | Noted: 2019-12-03 | Resolved: 2020-01-08

## 2020-01-08 PROBLEM — E87.2 METABOLIC ACIDOSIS: Status: RESOLVED | Noted: 2019-10-01 | Resolved: 2020-01-08

## 2020-01-08 LAB
BACTERIA UR QL AUTO: ABNORMAL /HPF
BASOPHILS # BLD AUTO: 0.04 THOUSANDS/ΜL (ref 0–0.1)
BASOPHILS NFR BLD AUTO: 1 % (ref 0–1)
BILIRUB UR QL STRIP: NEGATIVE
CLARITY UR: ABNORMAL
COLOR UR: YELLOW
EOSINOPHIL # BLD AUTO: 0.28 THOUSAND/ΜL (ref 0–0.61)
EOSINOPHIL NFR BLD AUTO: 4 % (ref 0–6)
ERYTHROCYTE [DISTWIDTH] IN BLOOD BY AUTOMATED COUNT: 16.4 % (ref 11.6–15.1)
GLUCOSE UR STRIP-MCNC: NEGATIVE MG/DL
HCT VFR BLD AUTO: 24 % (ref 34.8–46.1)
HGB BLD-MCNC: 7.4 G/DL (ref 11.5–15.4)
HGB UR QL STRIP.AUTO: NEGATIVE
IGA SERPL-MCNC: 92 MG/DL (ref 70–400)
IGG SERPL-MCNC: 1570 MG/DL (ref 700–1600)
IGM SERPL-MCNC: 29 MG/DL (ref 40–230)
IMM GRANULOCYTES # BLD AUTO: 0.13 THOUSAND/UL (ref 0–0.2)
IMM GRANULOCYTES NFR BLD AUTO: 2 % (ref 0–2)
KETONES UR STRIP-MCNC: NEGATIVE MG/DL
LEUKOCYTE ESTERASE UR QL STRIP: ABNORMAL
LYMPHOCYTES # BLD AUTO: 1.08 THOUSANDS/ΜL (ref 0.6–4.47)
LYMPHOCYTES NFR BLD AUTO: 16 % (ref 14–44)
MCH RBC QN AUTO: 33.2 PG (ref 26.8–34.3)
MCHC RBC AUTO-ENTMCNC: 30.8 G/DL (ref 31.4–37.4)
MCV RBC AUTO: 108 FL (ref 82–98)
MONOCYTES # BLD AUTO: 0.56 THOUSAND/ΜL (ref 0.17–1.22)
MONOCYTES NFR BLD AUTO: 8 % (ref 4–12)
NEUTROPHILS # BLD AUTO: 4.84 THOUSANDS/ΜL (ref 1.85–7.62)
NEUTS SEG NFR BLD AUTO: 69 % (ref 43–75)
NITRITE UR QL STRIP: POSITIVE
NON-SQ EPI CELLS URNS QL MICRO: ABNORMAL /HPF
NRBC BLD AUTO-RTO: 0 /100 WBCS
PH UR STRIP.AUTO: 8 [PH]
PLATELET # BLD AUTO: 179 THOUSANDS/UL (ref 149–390)
PMV BLD AUTO: 12.2 FL (ref 8.9–12.7)
PROT UR STRIP-MCNC: ABNORMAL MG/DL
RBC # BLD AUTO: 2.23 MILLION/UL (ref 3.81–5.12)
RBC #/AREA URNS AUTO: ABNORMAL /HPF
SP GR UR STRIP.AUTO: 1.01 (ref 1–1.03)
UROBILINOGEN UR QL STRIP.AUTO: 0.2 E.U./DL
WBC # BLD AUTO: 6.93 THOUSAND/UL (ref 4.31–10.16)
WBC #/AREA URNS AUTO: ABNORMAL /HPF

## 2020-01-08 PROCEDURE — 85025 COMPLETE CBC W/AUTO DIFF WBC: CPT | Performed by: INTERNAL MEDICINE

## 2020-01-08 PROCEDURE — 86334 IMMUNOFIX E-PHORESIS SERUM: CPT

## 2020-01-08 PROCEDURE — 36415 COLL VENOUS BLD VENIPUNCTURE: CPT | Performed by: INTERNAL MEDICINE

## 2020-01-08 PROCEDURE — 82784 ASSAY IGA/IGD/IGG/IGM EACH: CPT

## 2020-01-08 PROCEDURE — 86334 IMMUNOFIX E-PHORESIS SERUM: CPT | Performed by: PATHOLOGY

## 2020-01-08 PROCEDURE — 84165 PROTEIN E-PHORESIS SERUM: CPT

## 2020-01-08 PROCEDURE — 81001 URINALYSIS AUTO W/SCOPE: CPT

## 2020-01-08 PROCEDURE — 84165 PROTEIN E-PHORESIS SERUM: CPT | Performed by: PATHOLOGY

## 2020-01-08 PROCEDURE — 99214 OFFICE O/P EST MOD 30 MIN: CPT | Performed by: INTERNAL MEDICINE

## 2020-01-08 PROCEDURE — 83883 ASSAY NEPHELOMETRY NOT SPEC: CPT | Performed by: INTERNAL MEDICINE

## 2020-01-08 NOTE — TELEPHONE ENCOUNTER
----- Message from Helena Braxton [place] sent at 1/8/2020 11:33 AM EST -----  Baseline creatinine 2 5-3 and patient currently at 3 1  Will follow up for tacrolimus level  Make sure patient feeling well  Potassium borderline elevated, advise patient to follow low K diet  Start sodium bicarbonate tablets 650mg TID and recheck bmp/cbc in 1 week

## 2020-01-08 NOTE — TELEPHONE ENCOUNTER
Pt aware of the above, she is feeling ok, she already takes Sodium Bicarb 1300 tid  Per Nieves Barroso - pt to continue the same, mailed lab slip

## 2020-01-08 NOTE — PROGRESS NOTES
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    She had a history of transplanted kidney and pancreas new in 1998, subsequent pancreatic insufficiency, diabetes mellitus type 1, insulin dependent, bone marrow biopsy on 04/2019 showed progressive myeloma and plasma cell about 35%, molecular tests showed 13 Q deletion, trisomy 6, trisomy 17, hyper deploidy    She did not have response to Ixazomib, bortezomib was not induced because of grade 3 neuropathy, I actually Ixazomib was complicated with grade 3 rash    She was initiated on lenalidomide 5 mg p o  Every other day good response initially with decrease in the M protein, kappa light chain however the treatment was interrupted many times because of multiple admission to the hospital with over fluid load, constipation, fecal impaction, urinary tract infection    Current creatinine 2 69, BUN 37, which her baseline, resume Revlimid 5 mg p o   Every other day, I will obtain SPEP, free light chain today as a baseline and repeat CBC, CMP, SPEP, free light chain, quantitative immunoglobulin in 1 month    She will follow up closely with Nephrology                HPI:Lakesha Valdez is a 53-year-old  female with history of type 1 diabetes mellitus, diabetic neuropathy, diabetic nephropathy, status post pancreas and kidney transplant in 1998 at [address] [place] [address], amputation of the right big toe, cholecystectomy, vitamin-D deficiency, exacerbation of diabetes mellitus while she is on insulin, possible pancreas burn out, herpes zoster x2, who was found to have abnormal serum protein electrophoresis in August 2017 with IgG kappa a peak of 0 4 grams/deciliter and peak 2  of 2 27 grams/deciliter, mildly elevated kappa light chain, chronic kidney disease     Status post bone marrow biopsy 9/20/2017 showed 15-20% plasma cells, normal cytogenetics and normal FISH panel for myeloma   Bone skeletal survey showed no evidence of lytic lesions   She was diagnosed with smoldering multiple myeloma   She was meet with Dr Allison Wayne from [facility] to continue to observe     She has diabetic neuropathy grade 3, uses a cane for ambulation, herpes zoster in February 2019      On 04/10/2019 kappa light chain 69, lambda light chain 17 2 with a ratio of 4 0 which increased from ratio of 2 8 in January 2019  Serum protein electrophoresis showed M protein of 0 5 and 2 67 IgG kappa, IgG 2 9 g, IgA 62, IgM 31, creatinine 2 0, total protein 9, albumin 3, calcium 8 6     Repeat bone marrow biopsy on 04/17/2019 showed progressive multiple myeloma with plasma cells in the range of 35%, now with multiple chromosomal abnormalities including 13 Q deletion, 11 Q, trisomy 11, gain of 17 PO trisomy 17, hyperdiploid with cane of additional copies of chromosome 5, 6, 7, 9, 11, 15, 17, 18, large deletion involving most of the chromosome 13 (monosomy 13) 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     Ixazomib with severe pruritus requiring discontinuation in June 2019     Lenalidomide 5 mg p o   Daily 3 weeks on 1 week off approved initiated on 08/07/2019 and discontinued on 08/13/2019 because of pruritus     Admission to the hospital with chronic urinary tract infection, ultrasound on the transplanted kidney concerning for ATN possible rejection    Another admission to the hospital in the beginning of January 2020 with fluid overload, constipation, fecal impaction, currently on furosemide and torsemide, had been off lenalidomide for about 6 weeks      Interval History:        Previous Treatment:         Test Results:    Imaging: Ct Abdomen Pelvis Wo Contrast    Result Date: 12/10/2019  Narrative: CT ABDOMEN AND PELVIS WITHOUT IV CONTRAST INDICATION: Abdominal pain, acute, nonlocalized  Lower abdominal pain, nausea, diarrhea  Patient recently diagnosed with urinary tract infection  Prior history of kidney transplant  Prior history of pancreas transplant  History of diabetes, hypertension, multiple myeloma  COMPARISON:  December 2, 2019  TECHNIQUE:  CT examination of the abdomen and pelvis was performed without intravenous contrast   Axial, sagittal, and coronal 2D reformatted images were created from the source data and submitted for interpretation  Radiation dose length product (DLP) for this visit:  1494 63 mGy-cm   This examination, like all CT scans performed in the [facility], was performed utilizing techniques to minimize radiation dose exposure, including the use of iterative reconstruction and automated exposure control  Enteric contrast was administered  FINDINGS: ABDOMEN LOWER CHEST:  There are small bilateral pleural effusions, larger compared to the prior study  There is a pericardial effusion, measuring up to 12 mm posteriorly; this appears similar to the prior study  LIVER/BILIARY TREE:  Small calcifications within the inferomedial aspect of the right lobe of the liver appears similar to the prior study  GALLBLADDER:  Gallbladder is surgically absent  SPLEEN:  Unremarkable  PANCREAS:  The pancreas is atrophic, similar to the prior study  ADRENAL GLANDS:  Unremarkable  KIDNEYS/URETERS:  The native kidneys are markedly atrophic bilaterally  There is a 1 7 cm cyst arising from the lower pole right kidney, unchanged compared to the prior study  A transplant kidney is present in the left iliac fossa nonspecific perinephric stranding is again evident  There is no hydronephrosis  STOMACH AND BOWEL:  There is no evidence of bowel obstruction  Bowel wall thickening of the proximal to mid sigmoid and distal left colon is again evident  This could be due to colitis  Clinical and laboratory correlation is recommended   APPENDIX: No findings to suggest appendicitis  ABDOMINOPELVIC CAVITY:  There is a small amount of ascites, increased slightly compared to the prior study  There is no pneumoperitoneum  VESSELS:  There is extensive atherosclerosis  There is no abdominal aortic aneurysm  PELVIS REPRODUCTIVE ORGANS:  Unremarkable for patient's age  URINARY BLADDER:  A urinary bladder diverticulum containing calculi and possibly surgical sutures is again evident, similar to the prior study  As previously stated, this may be related to history of prior pancreas transplant  ABDOMINAL WALL/INGUINAL REGIONS:  There is some infiltration of the subcutaneous fat suggesting a degree of anasarca  OSSEOUS STRUCTURES:  No acute fracture or destructive osseous lesion  Impression: There is bowel wall thickening involving the proximal to mid sigmoid and the distal left colon  This may be due to colitis  Clinical and laboratory correlation is recommended  Follow-up after treatment is recommended  There is a small amount of ascites, increased slightly compared to the prior study  There are small bilateral pleural effusions, larger compared to the prior study  There is a pericardial effusion, this appears similar to the prior study  There is some infiltration of the subcutaneous fat suggesting a degree of anasarca  The native kidneys are markedly atrophic bilaterally  A small right renal cyst appears unchanged  A transplant kidney is again evident within the left iliac fossa  There is some nonspecific perinephric stranding adjacent to the transplant kidney, similar to the previous examination  There is no hydronephrosis  A urinary bladder diverticulum containing calculi and possibly surgical sutures appears similar to the prior study  Workstation performed: [id number]     Xr Abdomen Obstruction Series    Result Date: 12/27/2019  Narrative: OBSTRUCTION SERIES INDICATION:   f/u constipation   COMPARISON:  12/18/2019 EXAM PERFORMED/VIEWS:  XR ABDOMEN OBSTRUCTION SERIES FINDINGS: There is a nonobstructive bowel gas pattern with decreased colonic stool burden  Scattered nonspecific air-fluid levels are noted, likely related to laxative use  No free air beneath the hemidiaphragms  No pathologic calcifications or soft tissue masses evident  Cholecystectomy clips again seen  Osseous structures are unremarkable  Examination of the chest reveals a normal cardiomediastinal silhouette  Lungs are clear  Impression: Nonobstructive bowel gas pattern with decreased colonic stool burden  Workstation performed: [id number]     Xr Abdomen Obstruction Series    Result Date: 12/18/2019  Narrative: OBSTRUCTION SERIES INDICATION:   crampy pain with mucousy diarrhea  COMPARISON:  None EXAM PERFORMED/VIEWS:  XR ABDOMEN OBSTRUCTION SERIES FINDINGS: There is a nonobstructive bowel gas pattern  Increased colorectal stool noted  Residual enteric contrast within the large bowel  No free air beneath the hemidiaphragms  No pathologic calcifications or soft tissue masses evident  Osseous structures are unremarkable  Examination of the chest reveals a enlarged cardiomediastinal silhouette  Lungs are clear  Impression: 1  Increased colorectal stool without obstruction or free air  2  Cardiomegaly  Workstation performed: [id number]     Vas Lower Limb Venous Duplex Study, Complete Bilateral    Result Date: 12/24/2019  Narrative:  [facility] REPORT CLINICAL: Indications: Patient presents with increased bilateral lower extremity pain and edema x 1 day  Physician wants to R/O DVT  Operative History: 2005-01-01 B/L great toe amputations - osteomyelitis 1998-08-07 Left pelvic kidney transplant 1998-08-07 Pancreas transplant Risk Factors The patient has history of Obesity, HTN, Diabetes, HLD, CKD, CHF, Multiple myeloma, and previous smoking (quit >10yrs ago)  The patient's current BMI is 36 03, Weight is 237 lb and height is 68 in    FINDINGS:  Segment  Right            Left Impression       Impression       CFV      Normal (Patent)  Normal (Patent)     CONCLUSION:  Impression:  RIGHT LOWER LIMB: No evidence of acute or chronic deep vein thrombosis  No evidence of superficial thrombophlebitis noted  Doppler evaluation shows a normal response to augmentation maneuvers  Popliteal, posterior tibial and anterior tibial arterial Doppler waveforms are triphasic/biphasic  LEFT LOWER LIMB: No evidence of acute or chronic deep vein thrombosis  No evidence of superficial thrombophlebitis noted  Doppler evaluation shows a normal response to augmentation maneuvers  Popliteal, posterior tibial and anterior tibial arterial Doppler waveforms are triphasic/biphasic  Tech Note: Technically difficult secondary to severe pitting edema bilaterally  Technical findings were faxed to chart  SIGNATURE: Electronically Signed by: Filiberto Krishnamurthy MD, RPVI on 2019-12-24 05:43:12 PM      Labs:   Lab Results   Component Value Date    WBC 6 60 01/02/2020    HGB 7 9 (L) 01/02/2020    HCT 26 3 (L) 01/02/2020     (H) 01/02/2020     01/02/2020     Lab Results   Component Value Date     (L) 01/06/2016    K 4 6 01/02/2020     (H) 01/02/2020    CO2 18 (L) 01/02/2020    ANIONGAP 6 01/06/2016    BUN 37 (H) 01/02/2020    CREATININE 2 69 (H) 01/02/2020    GLUCOSE 103 09/13/2017    GLUF 121 (H) 11/04/2019    CALCIUM 8 9 01/02/2020    CORRECTEDCA 11 7 (H) 08/28/2018    AST 9 01/02/2020    ALT 14 01/02/2020    ALKPHOS 86 01/02/2020    PROT 8 3 (H) 12/14/2015    BILITOT 0 27 12/14/2015    EGFR 19 01/02/2020       Lab Results   Component Value Date    IRON 54 12/25/2019    TIBC 190 (L) 12/25/2019    FERRITIN 84 12/25/2019       Lab Results   Component Value Date    IKNFZMAC08 438 07/14/2014         ROS: Review of Systems   Constitutional: Negative for appetite change, chills, diaphoresis, fatigue and unexpected weight change     HENT:   Negative for mouth sores, nosebleeds, sore throat, trouble swallowing and voice change  Eyes: Negative for eye problems and icterus  Respiratory: Positive for shortness of breath (Exertional dyspnea)  Negative for chest tightness, cough, hemoptysis and wheezing  Cardiovascular: Negative for chest pain, leg swelling and palpitations  Gastrointestinal: Negative for abdominal distention, abdominal pain, blood in stool, constipation, diarrhea, nausea and vomiting  Endocrine: Negative for hot flashes  Genitourinary: Negative for bladder incontinence, difficulty urinating, dyspareunia, dysuria and frequency  Musculoskeletal: Positive for gait problem  Negative for arthralgias, back pain, neck pain and neck stiffness  Skin: Negative for itching and rash  Neurological: Positive for extremity weakness and gait problem  Negative for dizziness, headaches, numbness, seizures and speech difficulty  Hematological: Negative for adenopathy  Does not bruise/bleed easily  Psychiatric/Behavioral: Negative for decreased concentration, depression, sleep disturbance and suicidal ideas  The patient is not nervous/anxious  Current Medications: Reviewed  Allergies: Reviewed  PMH/FH/SH:  Reviewed      Physical Exam:    Body surface area is 2 11 meters squared  Wt Readings from Last 3 Encounters:   01/08/20 100 kg (221 lb)   01/02/20 99 kg (218 lb 3 2 oz)   12/06/19 107 kg (235 lb 7 2 oz)        Temp Readings from Last 3 Encounters:   01/08/20 98 °F (36 7 °C)   01/02/20 98 5 °F (36 9 °C)   12/06/19 97 9 °F (36 6 °C) (Oral)        BP Readings from Last 3 Encounters:   01/08/20 142/62   01/02/20 (!) 213/86   12/06/19 168/69         Pulse Readings from Last 3 Encounters:   01/08/20 77   01/02/20 72   12/06/19 62        Physical Exam   Constitutional: She is oriented to person, place, and time  She appears well-developed and well-nourished  No distress  HENT:   Head: Normocephalic and atraumatic  Eyes: Conjunctivae are normal    Neck: Normal range of motion   Neck supple  No tracheal deviation present  Cardiovascular: Normal rate and regular rhythm  Exam reveals no gallop and no friction rub  No murmur heard  Pulmonary/Chest: Effort normal and breath sounds normal  No respiratory distress  She has no wheezes  She has no rales  She exhibits no tenderness  Abdominal: Soft  She exhibits no distension  There is no tenderness  Musculoskeletal: She exhibits edema (Plus two edema of the right lower extremity +1 edema of the left lower extremity)  Lymphadenopathy:     She has no cervical adenopathy  Neurological: She is alert and oriented to person, place, and time  Skin: Skin is warm and dry  She is not diaphoretic  No erythema  No pallor  Psychiatric: She has a normal mood and affect  Her behavior is normal  Judgment and thought content normal    Vitals reviewed  ECO    Goals and Barriers:  Current Goal: Minimize effects of disease  Barriers: None  Patient's Capacity to Self Care:  Patient is able to self care      Code Status: [unfilled]

## 2020-01-09 LAB
ALBUMIN SERPL ELPH-MCNC: 3.62 G/DL (ref 3.5–5)
ALBUMIN SERPL ELPH-MCNC: 51 % (ref 52–65)
ALPHA1 GLOB SERPL ELPH-MCNC: 0.38 G/DL (ref 0.1–0.4)
ALPHA1 GLOB SERPL ELPH-MCNC: 5.3 % (ref 2.5–5)
ALPHA2 GLOB SERPL ELPH-MCNC: 0.72 G/DL (ref 0.4–1.2)
ALPHA2 GLOB SERPL ELPH-MCNC: 10.2 % (ref 7–13)
BETA GLOB ABNORMAL SERPL ELPH-MCNC: 0.35 G/DL (ref 0.4–0.8)
BETA1 GLOB SERPL ELPH-MCNC: 4.9 % (ref 5–13)
BETA2 GLOB SERPL ELPH-MCNC: 7.6 % (ref 2–8)
BETA2+GAMMA GLOB SERPL ELPH-MCNC: 0.54 G/DL (ref 0.2–0.5)
GAMMA GLOB ABNORMAL SERPL ELPH-MCNC: 1.49 G/DL (ref 0.5–1.6)
GAMMA GLOB SERPL ELPH-MCNC: 21 % (ref 12–22)
IGG/ALB SER: 1.04 {RATIO} (ref 1.1–1.8)
INTERPRETATION UR IFE-IMP: NORMAL
KAPPA LC FREE SER-MCNC: 107.2 MG/L (ref 3.3–19.4)
KAPPA LC FREE/LAMBDA FREE SER: 2.86 {RATIO} (ref 0.26–1.65)
LAMBDA LC FREE SERPL-MCNC: 37.5 MG/L (ref 5.7–26.3)
M PEAK ID 2: 16.4 %
M PROTEIN 1 MFR SERPL ELPH: 5.7 %
M PROTEIN 1 SERPL ELPH-MCNC: 0.4 G/DL
M PROTEIN 2 SERPL ELPH-MCNC: 1.16 G/DL
PROT PATTERN SERPL ELPH-IMP: ABNORMAL
PROT SERPL-MCNC: 7.1 G/DL (ref 6.4–8.2)

## 2020-01-14 ENCOUNTER — DOCUMENTATION (OUTPATIENT)
Dept: NEPHROLOGY | Facility: CLINIC | Age: 55
End: 2020-01-14

## 2020-01-14 DIAGNOSIS — T86.10 RENAL TRANSPLANT DISORDER: Primary | ICD-10-CM

## 2020-01-14 NOTE — PROGRESS NOTES
In addition to the note from last week, can you please have the patient repeat a CMP end of this week or early next week at the latest    Pt aware, she will go on Friday

## 2020-01-15 ENCOUNTER — TELEPHONE (OUTPATIENT)
Dept: HEMATOLOGY ONCOLOGY | Facility: CLINIC | Age: 55
End: 2020-01-15

## 2020-01-15 DIAGNOSIS — C90.00 MULTIPLE MYELOMA NOT HAVING ACHIEVED REMISSION (HCC): Primary | ICD-10-CM

## 2020-01-15 RX ORDER — ONDANSETRON 4 MG/1
4 TABLET, FILM COATED ORAL EVERY 6 HOURS PRN
Qty: 30 TABLET | Refills: 1 | Status: SHIPPED | OUTPATIENT
Start: 2020-01-15 | End: 2020-01-23 | Stop reason: HOSPADM

## 2020-01-15 NOTE — TELEPHONE ENCOUNTER
Verified [facility] as [facility]  Call her is needed @ [phone]

## 2020-01-16 ENCOUNTER — EVALUATION (OUTPATIENT)
Dept: PHYSICAL THERAPY | Facility: CLINIC | Age: 55
End: 2020-01-16
Payer: MEDICARE

## 2020-01-16 VITALS — DIASTOLIC BLOOD PRESSURE: 35 MMHG | SYSTOLIC BLOOD PRESSURE: 150 MMHG | HEART RATE: 56 BPM

## 2020-01-16 DIAGNOSIS — R10.9 ABDOMINAL PAIN: ICD-10-CM

## 2020-01-16 DIAGNOSIS — R10.84 GENERALIZED ABDOMINAL PAIN: ICD-10-CM

## 2020-01-16 DIAGNOSIS — R53.81 PHYSICAL DECONDITIONING: ICD-10-CM

## 2020-01-16 DIAGNOSIS — Z74.09 DECREASED MOBILITY AND ENDURANCE: Primary | ICD-10-CM

## 2020-01-16 PROCEDURE — 97110 THERAPEUTIC EXERCISES: CPT | Performed by: PHYSICAL THERAPIST

## 2020-01-16 PROCEDURE — 97163 PT EVAL HIGH COMPLEX 45 MIN: CPT | Performed by: PHYSICAL THERAPIST

## 2020-01-17 ENCOUNTER — TRANSCRIBE ORDERS (OUTPATIENT)
Dept: ADMINISTRATIVE | Age: 55
End: 2020-01-17

## 2020-01-17 ENCOUNTER — TELEPHONE (OUTPATIENT)
Dept: NEPHROLOGY | Facility: CLINIC | Age: 55
End: 2020-01-17

## 2020-01-17 ENCOUNTER — HOSPITAL ENCOUNTER (INPATIENT)
Facility: HOSPITAL | Age: 55
LOS: 6 days | Discharge: HOME | DRG: 683 | End: 2020-01-23
Attending: EMERGENCY MEDICINE | Admitting: INTERNAL MEDICINE
Payer: MEDICARE

## 2020-01-17 ENCOUNTER — OFFICE VISIT (OUTPATIENT)
Dept: FAMILY MEDICINE CLINIC | Facility: CLINIC | Age: 55
End: 2020-01-17
Payer: MEDICARE

## 2020-01-17 ENCOUNTER — APPOINTMENT (OUTPATIENT)
Dept: LAB | Age: 55
End: 2020-01-17
Payer: MEDICARE

## 2020-01-17 VITALS
WEIGHT: 219 LBS | RESPIRATION RATE: 16 BRPM | DIASTOLIC BLOOD PRESSURE: 40 MMHG | TEMPERATURE: 97.3 F | HEART RATE: 70 BPM | SYSTOLIC BLOOD PRESSURE: 150 MMHG | BODY MASS INDEX: 34.37 KG/M2 | HEIGHT: 67 IN | OXYGEN SATURATION: 97 %

## 2020-01-17 DIAGNOSIS — R82.71 BACTERIURIA: ICD-10-CM

## 2020-01-17 DIAGNOSIS — R53.1 WEAKNESS: ICD-10-CM

## 2020-01-17 DIAGNOSIS — I50.33 ACUTE ON CHRONIC DIASTOLIC CONGESTIVE HEART FAILURE (HCC): ICD-10-CM

## 2020-01-17 DIAGNOSIS — I10 BENIGN ESSENTIAL HTN: ICD-10-CM

## 2020-01-17 DIAGNOSIS — I10 ESSENTIAL HYPERTENSION: ICD-10-CM

## 2020-01-17 DIAGNOSIS — E03.9 ACQUIRED HYPOTHYROIDISM: ICD-10-CM

## 2020-01-17 DIAGNOSIS — E10.42 CONTROLLED TYPE 1 DIABETES MELLITUS WITH DIABETIC POLYNEUROPATHY (HCC): ICD-10-CM

## 2020-01-17 DIAGNOSIS — N17.9 AKI (ACUTE KIDNEY INJURY) (HCC): ICD-10-CM

## 2020-01-17 DIAGNOSIS — R26.2 AMBULATORY DYSFUNCTION: ICD-10-CM

## 2020-01-17 DIAGNOSIS — T86.10 COMPLICATION OF TRANSPLANTED KIDNEY, UNSPECIFIED COMPLICATION: Primary | ICD-10-CM

## 2020-01-17 DIAGNOSIS — C90.00 MULTIPLE MYELOMA NOT HAVING ACHIEVED REMISSION (HCC): ICD-10-CM

## 2020-01-17 DIAGNOSIS — E87.2 ACIDEMIA: ICD-10-CM

## 2020-01-17 DIAGNOSIS — T86.10 COMPLICATION OF TRANSPLANTED KIDNEY, UNSPECIFIED COMPLICATION: ICD-10-CM

## 2020-01-17 DIAGNOSIS — D63.1 ANEMIA DUE TO STAGE 3 CHRONIC KIDNEY DISEASE (HCC): Primary | ICD-10-CM

## 2020-01-17 DIAGNOSIS — R82.71 ASYMPTOMATIC BACTERIURIA: ICD-10-CM

## 2020-01-17 DIAGNOSIS — N18.4 CHRONIC KIDNEY DISEASE, STAGE 4 (SEVERE) (HCC): ICD-10-CM

## 2020-01-17 DIAGNOSIS — Z94.0 RENAL TRANSPLANT RECIPIENT: Primary | ICD-10-CM

## 2020-01-17 DIAGNOSIS — N18.30 ANEMIA DUE TO STAGE 3 CHRONIC KIDNEY DISEASE (HCC): Primary | ICD-10-CM

## 2020-01-17 LAB
ANION GAP SERPL CALCULATED.3IONS-SCNC: 12 MMOL/L (ref 4–13)
APTT PPP: 22 SECONDS (ref 23–37)
BACTERIA UR QL AUTO: ABNORMAL /HPF
BASOPHILS # BLD AUTO: 0.02 THOUSANDS/ΜL (ref 0–0.1)
BASOPHILS NFR BLD AUTO: 0 % (ref 0–1)
BILIRUB UR QL STRIP: NEGATIVE
BUN SERPL-MCNC: 56 MG/DL (ref 5–25)
CALCIUM SERPL-MCNC: 8.7 MG/DL (ref 8.3–10.1)
CHLORIDE SERPL-SCNC: 104 MMOL/L (ref 100–108)
CLARITY UR: ABNORMAL
CO2 SERPL-SCNC: 20 MMOL/L (ref 21–32)
COLOR UR: YELLOW
CREAT SERPL-MCNC: 4.42 MG/DL (ref 0.6–1.3)
EOSINOPHIL # BLD AUTO: 0.26 THOUSAND/ΜL (ref 0–0.61)
EOSINOPHIL NFR BLD AUTO: 4 % (ref 0–6)
ERYTHROCYTE [DISTWIDTH] IN BLOOD BY AUTOMATED COUNT: 14.9 % (ref 11.6–15.1)
ERYTHROCYTE [DISTWIDTH] IN BLOOD BY AUTOMATED COUNT: 14.9 % (ref 11.6–15.1)
FERRITIN SERPL-MCNC: 143 NG/ML (ref 8–388)
GFR SERPL CREATININE-BSD FRML MDRD: 11 ML/MIN/1.73SQ M
GLUCOSE SERPL-MCNC: 101 MG/DL (ref 65–140)
GLUCOSE SERPL-MCNC: 158 MG/DL (ref 65–140)
GLUCOSE SERPL-MCNC: 232 MG/DL (ref 65–140)
GLUCOSE UR STRIP-MCNC: NEGATIVE MG/DL
HCT VFR BLD AUTO: 21.1 % (ref 34.8–46.1)
HCT VFR BLD AUTO: 21.5 % (ref 34.8–46.1)
HGB BLD-MCNC: 6.5 G/DL (ref 11.5–15.4)
HGB BLD-MCNC: 6.5 G/DL (ref 11.5–15.4)
HGB RETIC QN AUTO: 32.6 PG (ref 30–38.3)
HGB UR QL STRIP.AUTO: ABNORMAL
IMM GRANULOCYTES # BLD AUTO: 0.13 THOUSAND/UL (ref 0–0.2)
IMM GRANULOCYTES NFR BLD AUTO: 2 % (ref 0–2)
IMM RETICS NFR: 19 % (ref 0–14)
INR PPP: 1.09 (ref 0.84–1.19)
IRON SATN MFR SERPL: 30 %
IRON SERPL-MCNC: 65 UG/DL (ref 50–170)
KETONES UR STRIP-MCNC: NEGATIVE MG/DL
LDH SERPL-CCNC: 188 U/L (ref 81–234)
LEUKOCYTE ESTERASE UR QL STRIP: ABNORMAL
LYMPHOCYTES # BLD AUTO: 0.98 THOUSANDS/ΜL (ref 0.6–4.47)
LYMPHOCYTES NFR BLD AUTO: 13 % (ref 14–44)
MCH RBC QN AUTO: 32.2 PG (ref 26.8–34.3)
MCH RBC QN AUTO: 33 PG (ref 26.8–34.3)
MCHC RBC AUTO-ENTMCNC: 30.2 G/DL (ref 31.4–37.4)
MCHC RBC AUTO-ENTMCNC: 30.8 G/DL (ref 31.4–37.4)
MCV RBC AUTO: 106 FL (ref 82–98)
MCV RBC AUTO: 107 FL (ref 82–98)
MONOCYTES # BLD AUTO: 0.24 THOUSAND/ΜL (ref 0.17–1.22)
MONOCYTES NFR BLD AUTO: 3 % (ref 4–12)
NEUTROPHILS # BLD AUTO: 5.69 THOUSANDS/ΜL (ref 1.85–7.62)
NEUTS SEG NFR BLD AUTO: 78 % (ref 43–75)
NITRITE UR QL STRIP: NEGATIVE
NON-SQ EPI CELLS URNS QL MICRO: ABNORMAL /HPF
NRBC BLD AUTO-RTO: 0 /100 WBCS
PH UR STRIP.AUTO: 8 [PH]
PLATELET # BLD AUTO: 163 THOUSANDS/UL (ref 149–390)
PLATELET # BLD AUTO: 164 THOUSANDS/UL (ref 149–390)
PMV BLD AUTO: 12.8 FL (ref 8.9–12.7)
PMV BLD AUTO: 13.5 FL (ref 8.9–12.7)
POTASSIUM SERPL-SCNC: 5.1 MMOL/L (ref 3.5–5.3)
PROT UR STRIP-MCNC: ABNORMAL MG/DL
PROTHROMBIN TIME: 13.5 SECONDS (ref 11.6–14.5)
RBC # BLD AUTO: 1.97 MILLION/UL (ref 3.81–5.12)
RBC # BLD AUTO: 2.02 MILLION/UL (ref 3.81–5.12)
RBC #/AREA URNS AUTO: ABNORMAL /HPF
RETICS # AUTO: ABNORMAL 10*3/UL (ref 14097–95744)
RETICS # CALC: 2.93 % (ref 0.37–1.87)
SODIUM SERPL-SCNC: 136 MMOL/L (ref 136–145)
SP GR UR STRIP.AUTO: 1.01 (ref 1–1.03)
TIBC SERPL-MCNC: 215 UG/DL (ref 250–450)
UROBILINOGEN UR QL STRIP.AUTO: 0.2 E.U./DL
WBC # BLD AUTO: 7.32 THOUSAND/UL (ref 4.31–10.16)
WBC # BLD AUTO: 8 THOUSAND/UL (ref 4.31–10.16)
WBC #/AREA URNS AUTO: ABNORMAL /HPF

## 2020-01-17 PROCEDURE — 85025 COMPLETE CBC W/AUTO DIFF WBC: CPT | Performed by: EMERGENCY MEDICINE

## 2020-01-17 PROCEDURE — 83615 LACTATE (LD) (LDH) ENZYME: CPT | Performed by: PHYSICIAN ASSISTANT

## 2020-01-17 PROCEDURE — 86870 RBC ANTIBODY IDENTIFICATION: CPT | Performed by: EMERGENCY MEDICINE

## 2020-01-17 PROCEDURE — 83550 IRON BINDING TEST: CPT | Performed by: PHYSICIAN ASSISTANT

## 2020-01-17 PROCEDURE — 99222 1ST HOSP IP/OBS MODERATE 55: CPT | Performed by: INTERNAL MEDICINE

## 2020-01-17 PROCEDURE — 87077 CULTURE AEROBIC IDENTIFY: CPT | Performed by: PHYSICIAN ASSISTANT

## 2020-01-17 PROCEDURE — 85730 THROMBOPLASTIN TIME PARTIAL: CPT | Performed by: EMERGENCY MEDICINE

## 2020-01-17 PROCEDURE — 82728 ASSAY OF FERRITIN: CPT | Performed by: PHYSICIAN ASSISTANT

## 2020-01-17 PROCEDURE — 85046 RETICYTE/HGB CONCENTRATE: CPT | Performed by: PHYSICIAN ASSISTANT

## 2020-01-17 PROCEDURE — 82948 REAGENT STRIP/BLOOD GLUCOSE: CPT

## 2020-01-17 PROCEDURE — 86901 BLOOD TYPING SEROLOGIC RH(D): CPT | Performed by: EMERGENCY MEDICINE

## 2020-01-17 PROCEDURE — 99284 EMERGENCY DEPT VISIT MOD MDM: CPT

## 2020-01-17 PROCEDURE — 86900 BLOOD TYPING SEROLOGIC ABO: CPT | Performed by: EMERGENCY MEDICINE

## 2020-01-17 PROCEDURE — 86850 RBC ANTIBODY SCREEN: CPT | Performed by: EMERGENCY MEDICINE

## 2020-01-17 PROCEDURE — 99285 EMERGENCY DEPT VISIT HI MDM: CPT | Performed by: EMERGENCY MEDICINE

## 2020-01-17 PROCEDURE — 86921 COMPATIBILITY TEST INCUBATE: CPT

## 2020-01-17 PROCEDURE — 80048 BASIC METABOLIC PNL TOTAL CA: CPT | Performed by: EMERGENCY MEDICINE

## 2020-01-17 PROCEDURE — 85027 COMPLETE CBC AUTOMATED: CPT

## 2020-01-17 PROCEDURE — 83010 ASSAY OF HAPTOGLOBIN QUANT: CPT | Performed by: PHYSICIAN ASSISTANT

## 2020-01-17 PROCEDURE — 81001 URINALYSIS AUTO W/SCOPE: CPT | Performed by: PHYSICIAN ASSISTANT

## 2020-01-17 PROCEDURE — 83540 ASSAY OF IRON: CPT | Performed by: PHYSICIAN ASSISTANT

## 2020-01-17 PROCEDURE — 87186 SC STD MICRODIL/AGAR DIL: CPT | Performed by: PHYSICIAN ASSISTANT

## 2020-01-17 PROCEDURE — 99223 1ST HOSP IP/OBS HIGH 75: CPT | Performed by: INTERNAL MEDICINE

## 2020-01-17 PROCEDURE — 87086 URINE CULTURE/COLONY COUNT: CPT | Performed by: PHYSICIAN ASSISTANT

## 2020-01-17 PROCEDURE — 86922 COMPATIBILITY TEST ANTIGLOB: CPT

## 2020-01-17 PROCEDURE — 30233N1 TRANSFUSION OF NONAUTOLOGOUS RED BLOOD CELLS INTO PERIPHERAL VEIN, PERCUTANEOUS APPROACH: ICD-10-PCS | Performed by: EMERGENCY MEDICINE

## 2020-01-17 PROCEDURE — 1111F DSCHRG MED/CURRENT MED MERGE: CPT | Performed by: FAMILY MEDICINE

## 2020-01-17 PROCEDURE — 99495 TRANSJ CARE MGMT MOD F2F 14D: CPT | Performed by: FAMILY MEDICINE

## 2020-01-17 PROCEDURE — 85610 PROTHROMBIN TIME: CPT | Performed by: EMERGENCY MEDICINE

## 2020-01-17 RX ORDER — ONDANSETRON 2 MG/ML
4 INJECTION INTRAMUSCULAR; INTRAVENOUS EVERY 6 HOURS PRN
Status: DISCONTINUED | OUTPATIENT
Start: 2020-01-17 | End: 2020-01-17

## 2020-01-17 RX ORDER — SEVELAMER HYDROCHLORIDE 800 MG/1
1600 TABLET, FILM COATED ORAL
Status: DISCONTINUED | OUTPATIENT
Start: 2020-01-17 | End: 2020-01-23 | Stop reason: HOSPADM

## 2020-01-17 RX ORDER — ACETAMINOPHEN 325 MG/1
650 TABLET ORAL EVERY 6 HOURS PRN
Status: DISCONTINUED | OUTPATIENT
Start: 2020-01-17 | End: 2020-01-23 | Stop reason: HOSPADM

## 2020-01-17 RX ORDER — LORAZEPAM 1 MG/1
2 TABLET ORAL 3 TIMES DAILY PRN
Status: DISCONTINUED | OUTPATIENT
Start: 2020-01-17 | End: 2020-01-23 | Stop reason: HOSPADM

## 2020-01-17 RX ORDER — MELATONIN
3000 DAILY
Status: DISCONTINUED | OUTPATIENT
Start: 2020-01-18 | End: 2020-01-23 | Stop reason: HOSPADM

## 2020-01-17 RX ORDER — ARIPIPRAZOLE 10 MG/1
30 TABLET ORAL
Status: DISCONTINUED | OUTPATIENT
Start: 2020-01-17 | End: 2020-01-23 | Stop reason: HOSPADM

## 2020-01-17 RX ORDER — SERTRALINE HYDROCHLORIDE 100 MG/1
200 TABLET, FILM COATED ORAL DAILY
Status: DISCONTINUED | OUTPATIENT
Start: 2020-01-18 | End: 2020-01-23 | Stop reason: HOSPADM

## 2020-01-17 RX ORDER — INSULIN GLARGINE 100 [IU]/ML
4 INJECTION, SOLUTION SUBCUTANEOUS
Status: DISCONTINUED | OUTPATIENT
Start: 2020-01-17 | End: 2020-01-23 | Stop reason: HOSPADM

## 2020-01-17 RX ORDER — AMLODIPINE BESYLATE 5 MG/1
5 TABLET ORAL EVERY EVENING
Status: DISCONTINUED | OUTPATIENT
Start: 2020-01-17 | End: 2020-01-17

## 2020-01-17 RX ORDER — TACROLIMUS 1 MG/1
2 CAPSULE ORAL EVERY 12 HOURS SCHEDULED
Status: DISCONTINUED | OUTPATIENT
Start: 2020-01-17 | End: 2020-01-23 | Stop reason: HOSPADM

## 2020-01-17 RX ORDER — DULOXETIN HYDROCHLORIDE 60 MG/1
60 CAPSULE, DELAYED RELEASE ORAL 2 TIMES DAILY
Status: DISCONTINUED | OUTPATIENT
Start: 2020-01-17 | End: 2020-01-23 | Stop reason: HOSPADM

## 2020-01-17 RX ORDER — SENNOSIDES 8.6 MG
1 TABLET ORAL DAILY
Status: DISCONTINUED | OUTPATIENT
Start: 2020-01-18 | End: 2020-01-23 | Stop reason: HOSPADM

## 2020-01-17 RX ORDER — ASPIRIN 81 MG/1
81 TABLET, CHEWABLE ORAL DAILY
Status: DISCONTINUED | OUTPATIENT
Start: 2020-01-18 | End: 2020-01-23 | Stop reason: HOSPADM

## 2020-01-17 RX ORDER — DOCUSATE SODIUM 100 MG/1
100 CAPSULE, LIQUID FILLED ORAL 2 TIMES DAILY
Status: DISCONTINUED | OUTPATIENT
Start: 2020-01-17 | End: 2020-01-23 | Stop reason: HOSPADM

## 2020-01-17 RX ORDER — INSULIN GLARGINE 100 [IU]/ML
5 INJECTION, SOLUTION SUBCUTANEOUS
Status: DISCONTINUED | OUTPATIENT
Start: 2020-01-17 | End: 2020-01-17

## 2020-01-17 RX ORDER — LEVOTHYROXINE SODIUM 0.12 MG/1
125 TABLET ORAL DAILY
Status: DISCONTINUED | OUTPATIENT
Start: 2020-01-18 | End: 2020-01-23 | Stop reason: HOSPADM

## 2020-01-17 RX ORDER — HYDRALAZINE HYDROCHLORIDE 100 MG/1
50 TABLET, FILM COATED ORAL 3 TIMES DAILY
Qty: 270 TABLET | Refills: 3 | Status: ON HOLD | OUTPATIENT
Start: 2020-01-17 | End: 2020-01-23 | Stop reason: SDUPTHER

## 2020-01-17 RX ORDER — FERROUS SULFATE 325(65) MG
325 TABLET ORAL
Status: DISCONTINUED | OUTPATIENT
Start: 2020-01-18 | End: 2020-01-23 | Stop reason: HOSPADM

## 2020-01-17 RX ORDER — ROPINIROLE 0.25 MG/1
0.25 TABLET, FILM COATED ORAL 2 TIMES DAILY
Status: DISCONTINUED | OUTPATIENT
Start: 2020-01-17 | End: 2020-01-23 | Stop reason: HOSPADM

## 2020-01-17 RX ORDER — FOLIC ACID 1 MG/1
1000 TABLET ORAL DAILY
Status: DISCONTINUED | OUTPATIENT
Start: 2020-01-18 | End: 2020-01-23 | Stop reason: HOSPADM

## 2020-01-17 RX ORDER — PREDNISONE 1 MG/1
5 TABLET ORAL DAILY
Status: DISCONTINUED | OUTPATIENT
Start: 2020-01-18 | End: 2020-01-23 | Stop reason: HOSPADM

## 2020-01-17 RX ORDER — METOPROLOL TARTRATE 50 MG/1
50 TABLET, FILM COATED ORAL EVERY 12 HOURS SCHEDULED
Status: DISCONTINUED | OUTPATIENT
Start: 2020-01-17 | End: 2020-01-17

## 2020-01-17 RX ORDER — AMLODIPINE BESYLATE 10 MG/1
10 TABLET ORAL DAILY
Status: DISCONTINUED | OUTPATIENT
Start: 2020-01-18 | End: 2020-01-23 | Stop reason: HOSPADM

## 2020-01-17 RX ORDER — HYDRALAZINE HYDROCHLORIDE 25 MG/1
50 TABLET, FILM COATED ORAL 3 TIMES DAILY
Status: DISCONTINUED | OUTPATIENT
Start: 2020-01-17 | End: 2020-01-19

## 2020-01-17 RX ORDER — CLONIDINE HYDROCHLORIDE 0.1 MG/1
0.2 TABLET ORAL EVERY 8 HOURS SCHEDULED
Status: DISCONTINUED | OUTPATIENT
Start: 2020-01-17 | End: 2020-01-23 | Stop reason: HOSPADM

## 2020-01-17 RX ORDER — SODIUM CHLORIDE 9 MG/ML
75 INJECTION, SOLUTION INTRAVENOUS CONTINUOUS
Status: DISCONTINUED | OUTPATIENT
Start: 2020-01-17 | End: 2020-01-17

## 2020-01-17 RX ORDER — SODIUM BICARBONATE 650 MG/1
1300 TABLET ORAL 3 TIMES DAILY
Status: DISCONTINUED | OUTPATIENT
Start: 2020-01-17 | End: 2020-01-21

## 2020-01-17 RX ORDER — METOPROLOL TARTRATE 50 MG/1
50 TABLET, FILM COATED ORAL EVERY 12 HOURS SCHEDULED
Status: DISCONTINUED | OUTPATIENT
Start: 2020-01-17 | End: 2020-01-23 | Stop reason: HOSPADM

## 2020-01-17 RX ORDER — DOXAZOSIN MESYLATE 1 MG/1
2 TABLET ORAL
Status: DISCONTINUED | OUTPATIENT
Start: 2020-01-17 | End: 2020-01-23 | Stop reason: HOSPADM

## 2020-01-17 RX ORDER — AMLODIPINE BESYLATE 5 MG/1
5 TABLET ORAL EVERY EVENING
Status: DISCONTINUED | OUTPATIENT
Start: 2020-01-17 | End: 2020-01-18

## 2020-01-17 RX ORDER — MAGNESIUM HYDROXIDE/ALUMINUM HYDROXICE/SIMETHICONE 120; 1200; 1200 MG/30ML; MG/30ML; MG/30ML
30 SUSPENSION ORAL EVERY 6 HOURS PRN
Status: DISCONTINUED | OUTPATIENT
Start: 2020-01-17 | End: 2020-01-17

## 2020-01-17 RX ORDER — BUSPIRONE HYDROCHLORIDE 5 MG/1
5 TABLET ORAL 2 TIMES DAILY
Status: DISCONTINUED | OUTPATIENT
Start: 2020-01-17 | End: 2020-01-23 | Stop reason: HOSPADM

## 2020-01-17 RX ORDER — PRAVASTATIN SODIUM 80 MG/1
80 TABLET ORAL
Status: DISCONTINUED | OUTPATIENT
Start: 2020-01-17 | End: 2020-01-23 | Stop reason: HOSPADM

## 2020-01-17 RX ORDER — POLYETHYLENE GLYCOL 3350 17 G/17G
17 POWDER, FOR SOLUTION ORAL DAILY
Status: DISCONTINUED | OUTPATIENT
Start: 2020-01-18 | End: 2020-01-23 | Stop reason: HOSPADM

## 2020-01-17 RX ADMIN — METOPROLOL TARTRATE 50 MG: 50 TABLET, FILM COATED ORAL at 22:53

## 2020-01-17 RX ADMIN — DOCUSATE SODIUM 100 MG: 100 CAPSULE, LIQUID FILLED ORAL at 17:08

## 2020-01-17 RX ADMIN — AMLODIPINE BESYLATE 5 MG: 5 TABLET ORAL at 17:10

## 2020-01-17 RX ADMIN — BUSPIRONE HYDROCHLORIDE 5 MG: 5 TABLET ORAL at 17:10

## 2020-01-17 RX ADMIN — HYDRALAZINE HYDROCHLORIDE 50 MG: 25 TABLET ORAL at 22:53

## 2020-01-17 RX ADMIN — CLONIDINE HYDROCHLORIDE 0.2 MG: 0.1 TABLET ORAL at 22:52

## 2020-01-17 RX ADMIN — INSULIN LISPRO 2 UNITS: 100 INJECTION, SOLUTION INTRAVENOUS; SUBCUTANEOUS at 17:48

## 2020-01-17 RX ADMIN — TACROLIMUS 2 MG: 1 CAPSULE ORAL at 22:51

## 2020-01-17 RX ADMIN — INSULIN GLARGINE 4 UNITS: 100 INJECTION, SOLUTION SUBCUTANEOUS at 22:51

## 2020-01-17 RX ADMIN — CLONIDINE HYDROCHLORIDE 0.2 MG: 0.1 TABLET ORAL at 17:09

## 2020-01-17 RX ADMIN — ARIPIPRAZOLE 30 MG: 10 TABLET ORAL at 22:46

## 2020-01-17 RX ADMIN — HYDRALAZINE HYDROCHLORIDE 50 MG: 25 TABLET ORAL at 17:09

## 2020-01-17 RX ADMIN — SODIUM BICARBONATE 650 MG TABLET 1300 MG: at 22:50

## 2020-01-17 RX ADMIN — DOXAZOSIN 2 MG: 1 TABLET ORAL at 22:53

## 2020-01-17 RX ADMIN — DULOXETINE HYDROCHLORIDE 60 MG: 60 CAPSULE, DELAYED RELEASE ORAL at 17:10

## 2020-01-17 RX ADMIN — ROPINIROLE HYDROCHLORIDE 0.25 MG: 0.25 TABLET, FILM COATED ORAL at 17:09

## 2020-01-17 RX ADMIN — SODIUM BICARBONATE 650 MG TABLET 1300 MG: at 17:09

## 2020-01-17 RX ADMIN — PRAVASTATIN SODIUM 80 MG: 80 TABLET ORAL at 17:09

## 2020-01-17 NOTE — ASSESSMENT & PLAN NOTE
· Home regimen includes clonidine 0 2 mg t i d , hydralazine 50 mg Q 8, Cardura 20 mg at bedtime, Lopressor 50 mg twice daily, Norvasc 10 mg daily with hold parameters  · Hold Aldactone 25 mg daily and torsemide 20 mg daily in the setting of renal failure  · Avoid hypotension

## 2020-01-17 NOTE — ASSESSMENT & PLAN NOTE
· Evaluated by Gastroenterology last admission for abdominal pain, constipation and fecal stasis  · Reports frequent diarrhea at home while on bowel regimen but now constipated   · Monitor stool output and treat diarrhea versus constipation accordingly  · Outpatient colonoscopy recommended

## 2020-01-17 NOTE — H&P
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 and follows Dr Mireille Mae  Status post kidney and pancreas transplantation 1998 on prednisone 5 mg daily, tacrolimus 2 mg b i d   · Presented to hospital today at the request of her outpatient nephrologist due to worsening creatinine and anemia  · Avoid hypotension, nephrotoxins  · Hold torsemide and Aldactone  · Gentle IV fluid hydration, dec rate to 75cc/hr and f/u renal input - note pt was diuresed with IV lasix and albumin last admission     * Anemia  Assessment & Plan  · Patient admitted with hemoglobin 6 5  Baseline hemoglobin mid 7 range with known his anemia of chronic disease and multiple myeloma    · Symptomatic with fatigue and chills  · Suspect this is due to worsening renal function, however will rule out GI bleeding and hemolysis  · Continue iron, folic acid - check levels  · Blood consent on chart  · Transfuse 2 units PRBCs now - hx of multiple autoantibodies     Chronic diastolic congestive heart failure (HCC)  Assessment & Plan  Wt Readings from Last 3 Encounters:   01/17/20 99 2 kg (218 lb 11 1 oz)   01/17/20 99 3 kg (219 lb)   01/08/20 100 kg (221 lb)   ·  Echocardiogram most recently with normal ejection fraction, grade 1 diastolic dysfunction with no wall motion abnormalities, moderate aortic regurgitation and trace pericardial effusion  · Last admission patient found to be in acute on chronic diastolic heart failure and was diuresed by Nephrology and received IV albumin and switch to p o  Torsemide  · Diuretics are on hold in the setting of acute renal failure    Chronic constipation  Assessment & Plan  · Evaluated by Gastroenterology last admission for abdominal pain, constipation and fecal stasis  · Reports frequent diarrhea at home while on bowel regimen but now constipated   · Monitor stool output and treat diarrhea versus constipation accordingly  · Outpatient colonoscopy recommended    Benign hypertension with CKD (chronic kidney disease) stage IV (HCC)  Assessment & Plan  · Home regimen includes clonidine 0 2 mg t i d , hydralazine 50 mg Q 8, Cardura 20 mg at bedtime, Lopressor 50 mg twice daily, Norvasc 10 mg daily with hold parameters  · Hold Aldactone 25 mg daily and torsemide 20 mg daily in the setting of renal failure  · Avoid hypotension    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 [facility])  Assessment & Plan  Lab Results   Component Value Date    HGBA1C 4 9 12/28/2019     No results for input(s): POCGLU in the last 72 hours    Blood Sugar Average: Last 72 hrs:  · Continue Lantus 5 units q h s , resume Toujeo at d/c  · SSI, accuchecks, ADA     Multiple myeloma not having achieved remission ([facility] )  Assessment & Plan  · Follows with Dr Jeanine Gonzalez for known history of IgG kappa multiple myeloma stage III diagnosed in April 2019 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  · Currently off Revlimid due to frequent readmissions to the hospital for volume overload, constipation/fecal impaction/colitis, urinary tract infection    Anxiety  Assessment & Plan  · Continue Cymbalta, Abilify, BuSpar, Requip, Zoloft, prn ativan     Atrial fibrillation (HCC)  Assessment & Plan  · Continue lopressor     Acquired hypothyroidism  Assessment & Plan  · Continue Synthroid    VTE Prophylaxis: Pharmacologic VTE Prophylaxis contraindicated due to acute anemia / sequential compression device   Code Status: FULL CODE - d/w pt and dad   POLST: POLST form is not discussed and not completed at this time  Discussion with family: dad at bedside     Anticipated Length of Stay:  Patient will be admitted on an Inpatient basis with an anticipated length of stay of  Greater than 2 midnights  Justification for Hospital Stay: acute anemia and dennis     Total Time for Visit, including Counseling / Coordination of Care: 45 minutes  Greater than 50% of this total time spent on direct patient counseling and coordination of care  Chief Complaint:   abnl labs     History of Present Illness: Jo Villanueva is a 53 y o  female past medical history significant for stage 4 chronic kidney disease with history of renal transplant , multiple myeloma , chronic diastolic heart failure, anemia of chronic disease, hypothyroidism, who presents with worsening renal function  Patient states she had outpatient lab work today and routine follow-up with her PCP  She apparently was feeling otherwise well apart from feeling generally weak and having occasional chills  She reports her nephrologist had called her to inform her that her renal function was worsening and asked her to present to the hospital   Patient notes normal urinary output  States recently she has been having issues with her bowels and having diarrhea from stool regimen that she was placed on for constipation during hospitalization most recently in January  Notes decreased appetite but states this has been improving since hospital discharge  Denied lightheadedness or dizziness  States she occasionally feels short of breath but this is not new for her  Lower extremity edema has improved since last admission  Review of Systems:    Review of Systems   Constitutional: Positive for chills  Negative for fever  HENT: Negative for congestion  Respiratory: Positive for shortness of breath      Cardiovascular: Positive for leg swelling  Negative for chest pain  Gastrointestinal: Positive for constipation and diarrhea  Negative for abdominal pain and blood in stool  Genitourinary: Negative for dysuria  Musculoskeletal: Negative for back pain  Allergic/Immunologic: Negative for immunocompromised state  Neurological: Negative for dizziness and numbness  Psychiatric/Behavioral: Negative for confusion       Past Medical and Surgical History:     Past Medical History:   Diagnosis Date    Abnormal liver function test     Acute kidney injury ([facility] )     Acute on chronic congestive heart failure (HCC)     Allergic urticaria     Anemia     Cancer (HCC)     Multiple myeloma    Cervical dysplasia     Cholelithiasis     Chronic diastolic (congestive) heart failure ([facility] ) 9/18/2017    Diabetes mellitus ([place] [address] )     Previous, controlled with diet    Diabetes mellitus with foot ulcer ([facility] )     Disease of thyroid gland     Encephalopathy     Hematuria     + leak est- secondary to UTIs/panc drainage    History of transfusion     Hyperkalemia     Hypertension     Iliotibial band syndrome     Lumbar radiculopathy     Multiple myeloma (HCC)     [facility] myel[facility]     Night blindness     Nonrheumatic aortic (valve) insufficiency     Pneumonia     Renal disorder     Retinopathy     Seborrhea     Seizure ([facility] )     Shingles     Sinus tachycardia     B blocker - cardio echo stress test 02 normal/neg LE doppler 2/02 OK and 12/07    Status post simultaneous kidney and pancreas transplant ([facility] )     Toe amputation status     Trochanteric bursitis        Past Surgical History:   Procedure Laterality Date    CATARACT EXTRACTION      CHOLECYSTECTOMY      COLONOSCOPY      two polyps in the rectum removed and biopsied diverticulosis in the sigmoid colon, external hemorrhoiods- Dr Barfield    COMBINED KIDNEY-PANCREAS TRANSPLANT N/A     CT BONE MARROW BIOPSY AND ASPIRATION  4/17/2019    CYSTOSCOPY N/A 10/13/2016    Procedure: CYSTOSCOPY, retrograde pyelogram, biopsy of ureteral polyp; Surgeon: Mukund Chaparro MD;  Location: [facility];  Service:    [address] [place] [address] DILATION AND CURETTAGE OF UTERUS      ESOPHAGOGASTRODUODENOSCOPY N/A 11/20/2017    Procedure: ESOPHAGOGASTRODUODENOSCOPY (EGD); Surgeon: Nivia Carver DO;  Location: [facility]; Service: Gastroenterology    EYE SURGERY      cataracts    FOOT AMPUTATION THROUGH METATARSAL Left     FOOT SURGERY Right     excision of metatarsal heads    HALLUX VALGUS CORRECTION Right     NEPHRECTOMY TRANSPLANTED ORGAN      PANCREATIC TRANSPLANT REMOVAL  1998       Meds/Allergies:    Prior to Admission medications    Medication Sig Start Date End Date Taking?  Authorizing Provider   amLODIPine (NORVASC) 10 mg tablet TAKE 1 TABLET(10MG) BY MOUTH EVERY MORNING AND 1/2 TABLET(5MG) EVERY EVENING  Patient taking differently: Take 10 mg by mouth TAKE 1 TABLET(10MG) BY MOUTH EVERY MORNING AND 1/2 TABLET(5MG) EVERY EVENING 4/8/19   Deangelo Matt MD   ARIPiprazole (ABILIFY) 30 mg tablet Take 1 tablet (30 mg total) by mouth daily at bedtime for 180 days 6/5/19 1/17/20  Bryan Ferguson MD   aspirin 81 MG tablet Take 1 tablet by mouth daily 6/5/13   Historical Provider, MD   busPIRone (BUSPAR) 5 mg tablet Take 1 tablet (5 mg total) by mouth 2 (two) times a day for 180 days 6/5/19 1/17/20  Bryan Ferguson MD   Cholecalciferol (VITAMIN D3) 1000 units CAPS Take 3 capsules by mouth daily      Historical Provider, MD   cloNIDine (CATAPRES) 0 2 mg tablet Take 1 tablet (0 2 mg total) by mouth every 8 (eight) hours 1/2/20   Alvarado Singleton MD   doxazosin (CARDURA) 2 mg tablet Take 1 tablet (2 mg total) by mouth daily at bedtime 1/2/20   Alvarado Singleton MD   DULoxetine (CYMBALTA) 60 mg delayed release capsule Take 1 capsule (60 mg total) by mouth 2 (two) times a day for 180 days 6/5/19 1/17/20  Bryan Ferguson MD   ferrous sulfate 325 (65 Fe) mg tablet Take 1 tablet (325 mg total) by mouth daily with breakfast 9/14/19   Jerel Gutierrez DO   folic acid (FOLVITE) 1 mg tablet TAKE 1 TABLET BY MOUTH DAILY AS DIRECTED 2/22/19   Shawna King MD   hydrALAZINE (APRESOLINE) 100 MG tablet Take 1 tablet (100 mg total) by mouth 3 (three) times a day 12/5/19   Aleksandra Chandler MD   Insulin Glargine (TOUJEO) 300 units/mL CONCETRATED U-300 injection pen Inject 5 Units under the skin daily at bedtime 12/5/19   Aleksandra Chandler MD   Insulin Pen Needle (BD PEN NEEDLE VISHNU U/F) 32G X 4 MM MISC Use 4 times daily 8/23/18   Kathleen Antunez MD   Lancets (ONETOUCH ULTRASOFT) lancets by Does not apply route 4 (four) times a day   3/9/16   Historical Provider, MD   lenalidomide (REVLIMID) 5 MG CAPS Take 5 mg every other day for 3 weeks on and 3 weeks off auth# [id number] 12/9/19 12/9/19   Dagoberto Wiseman MD   levothyroxine 125 mcg tablet Take 1 tablet (125 mcg total) by mouth daily 6/24/19   Nya Lutz PA-C   loperamide (IMODIUM) 2 mg capsule Take 1 capsule (2 mg total) by mouth 4 (four) times a day as needed for diarrhea 12/5/19   Aleksandra Chandler MD   LORazepam (ATIVAN) 2 mg tablet Take 1 tablet (2 mg total) by mouth 3 (three) times a day as needed for anxiety 11/13/19 3/12/20  Sigifredo Parisi MD   metoprolol tartrate (LOPRESSOR) 50 mg tablet TAKE 1 TABLET(50 MG TOTAL) BY MOUTH TWICE DAILY 10/4/19   Tamela Bautista MD   ondansetron (ZOFRAN) 4 mg tablet Take 1 tablet (4 mg total) by mouth every 6 (six) hours as needed for nausea or vomiting 1/15/20   Dagoberto Wiseman MD   pravastatin (PRAVACHOL) 80 mg tablet TAKE 1 TABLET BY MOUTH DAILY 1/29/18   Shawna King MD   predniSONE 5 mg tablet Take 1 tablet (5 mg total) by mouth daily 5/24/19   MER Valadez   rOPINIRole (REQUIP) 0 25 mg tablet TAKE 1 TABLET BY MOUTH TWICE DAILY 11/16/19   Shawna King MD   sertraline (ZOLOFT) 100 mg tablet TAKE 2 TABLETS(200MG) BY MOUTH DAILY  DAYS 1/7/20 4/6/20  Sigifredo Parisi MD   sevelamer (RENAGEL) 800 mg tablet Take 2 tablets (1,600 mg total) by mouth 3 (three) times a day with meals 20   Kate Mccord MD   sodium bicarbonate 650 mg tablet TAKE 2 TABLETS BY MOUTH THREE TIMES DAILY  Patient taking differently: 1,300 mg 3 (three) times a day  19   Marilee Vernon MD   spironolactone (ALDACTONE) 25 mg tablet Take 1 tablet (25 mg total) by mouth daily 1/3/20   Kate Mccord MD   tacrolimus (PROGRAF) 1 mg capsule Take 2 capsules (2 mg total) by mouth every 12 (twelve) hours 20   Kate Mccord MD   torsemide [facility]) 20 mg tablet Take 1 tablet (20 mg total) by mouth daily 1/3/20   Kate Mccord MD     I have reviewed home medications with patient personally  Allergies: Allergies   Allergen Reactions    Ixazomib Rash     Ninlaro    Revlimid [Lenalidomide] Rash    Cefadroxil      Tolerated cefepime     Latex Rash    Morphine Other (See Comments)     Other reaction(s): Other (See Comments)  projectile vomiting    Morphine And Related GI Intolerance    Myrbetriq [Mirabegron] Hives    Penicillins Hives     Other reaction(s):  Other (See Comments)  Respiratory Distress,hives  Tolerates cefazolin       Social History:     Marital Status: Legally    Occupation: disabled   Patient Pre-hospital Living Situation: with parents   Patient Pre-hospital Level of Mobility: no limits   Patient Pre-hospital Diet Restrictions: none   Substance Use History:   Social History     Substance and Sexual Activity   Alcohol Use Never    Frequency: Never    Binge frequency: Never    Comment: (history)     Social History     Tobacco Use   Smoking Status Former Smoker    Last attempt to quit: 2012    Years since quittin 7   Smokeless Tobacco Never Used     Social History     Substance and Sexual Activity   Drug Use Never    Types: Hydrocodone    Comment: Past cocaine use many years ago - no current use     Family History:    Family History   Problem Relation Age of Onset    Hypertension Mother     Cancer Mother  Hypertension Father     Cancer Father     Cancer Maternal Grandfather     Cancer Paternal Grandmother     Cancer Paternal Grandfather     Depression Sister     Breast cancer Maternal Grandmother 77       Physical Exam:     Vitals:   Blood Pressure: 157/71 (01/17/20 1606)  Pulse: 60 (01/17/20 1606)  Temperature: 97 5 °F (36 4 °C) (01/17/20 1606)  Temp Source: Oral (01/17/20 1606)  Respirations: 16 (01/17/20 1606)  Height: 5' 7" (170 2 cm) (01/17/20 1606)  Weight - Scale: 99 2 kg (218 lb 11 1 oz) (01/17/20 1413)  SpO2: 97 % (01/17/20 1606)    Physical Exam   Constitutional: She appears well-developed and well-nourished  No distress  HENT:   Head: Normocephalic and atraumatic  Eyes: EOM are normal  No scleral icterus  Neck: Normal range of motion  Neck supple  Cardiovascular: Normal rate, regular rhythm and normal heart sounds  No murmur heard  Pulmonary/Chest: Effort normal and breath sounds normal    Abdominal: Soft  Bowel sounds are normal  There is no tenderness  Musculoskeletal: Normal range of motion  She exhibits edema  Skin: Skin is warm and dry  There is pallor  Psychiatric: She has a normal mood and affect  Additional Data:     Lab Results: I have personally reviewed pertinent reports        Results from last 7 days   Lab Units 01/17/20  1516   WBC Thousand/uL 7 32   HEMOGLOBIN g/dL 6 5*   HEMATOCRIT % 21 5*   PLATELETS Thousands/uL 164   NEUTROS PCT % 78*   LYMPHS PCT % 13*   MONOS PCT % 3*   EOS PCT % 4     Results from last 7 days   Lab Units 01/17/20  1516 01/17/20  0758   SODIUM mmol/L 136 140   POTASSIUM mmol/L 5 1 4 9   CHLORIDE mmol/L 104 112*   CO2 mmol/L 20* 18*   BUN mg/dL 56* 49*   CREATININE mg/dL 4 42* 4 19*   ANION GAP mmol/L 12 10   CALCIUM mg/dL 8 7 8 4   ALBUMIN g/dL  --  3 1*   TOTAL BILIRUBIN mg/dL  --  0 53   ALK PHOS U/L  --  95   ALT U/L  --  14   AST U/L  --  7   GLUCOSE RANDOM mg/dL 158*  --      Results from last 7 days   Lab Units 01/17/20  1516   INR 1 09                   Imaging: I have personally reviewed pertinent reports  No orders to display       EKG, Pathology, and Other Studies Reviewed on Admission:   · EKG:     Allscripts / Epic Records Reviewed: Yes     ** Please Note: This note has been constructed using a voice recognition system   **

## 2020-01-17 NOTE — ED PROVIDER NOTES
History  Chief Complaint   Patient presents with    Abnormal Lab     Pt referred to ED by nephrology for elevated creatinine and low hemoglobin  Pt feels tired and mild dizziness  Denies SOB, denies change of skin color  Patient referred to the emergency department by her private nephrologist Laura Harmon with concern about anemic hemoglobin and increasing creatinine  Patient is a pancreas kidney transplant  Patient admits to feeling somewhat sluggish and lethargic but denies chest pain sob  Has been urinating  Denies fever chills  Denies vomiting diarrhea  Denies jaw arm neck pain  Denies dizziness  Prior to Admission Medications   Prescriptions Last Dose Informant Patient Reported? Taking?    ARIPiprazole (ABILIFY) 30 mg tablet  Self No No   Sig: Take 1 tablet (30 mg total) by mouth daily at bedtime for 180 days   Cholecalciferol (VITAMIN D3) 1000 units CAPS  Self Yes No   Sig: Take 3 capsules by mouth daily     DULoxetine (CYMBALTA) 60 mg delayed release capsule  Self No No   Sig: Take 1 capsule (60 mg total) by mouth 2 (two) times a day for 180 days   Insulin Glargine (TOUJEO) 300 units/mL CONCETRATED U-300 injection pen  Self No No   Sig: Inject 5 Units under the skin daily at bedtime   Insulin Pen Needle (BD PEN NEEDLE VISHNU U/F) 32G X 4 MM MISC  Self No No   Sig: Use 4 times daily   LORazepam (ATIVAN) 2 mg tablet  Self No No   Sig: Take 1 tablet (2 mg total) by mouth 3 (three) times a day as needed for anxiety   Lancets (ONETOUCH ULTRASOFT) lancets  Self Yes No   Sig: by Does not apply route 4 (four) times a day     amLODIPine (NORVASC) 10 mg tablet  Self No No   Sig: TAKE 1 TABLET(10MG) BY MOUTH EVERY MORNING AND 1/2 TABLET(5MG) EVERY EVENING   Patient taking differently: Take 10 mg by mouth TAKE 1 TABLET(10MG) BY MOUTH EVERY MORNING AND 1/2 TABLET(5MG) EVERY EVENING   aspirin 81 MG tablet  Self Yes No   Sig: Take 1 tablet by mouth daily   busPIRone (BUSPAR) 5 mg tablet  Self No No   Sig: Take 1 tablet (5 mg total) by mouth 2 (two) times a day for 180 days   cloNIDine (CATAPRES) 0 2 mg tablet  Self No No   Sig: Take 1 tablet (0 2 mg total) by mouth every 8 (eight) hours   doxazosin (CARDURA) 2 mg tablet  Self No No   Sig: Take 1 tablet (2 mg total) by mouth daily at bedtime   ferrous sulfate 325 (65 Fe) mg tablet  Self No No   Sig: Take 1 tablet (325 mg total) by mouth daily with breakfast   folic acid (FOLVITE) 1 mg tablet  Self No No   Sig: TAKE 1 TABLET BY MOUTH DAILY AS DIRECTED   hydrALAZINE (APRESOLINE) 100 MG tablet  Self No No   Sig: Take 1 tablet (100 mg total) by mouth 3 (three) times a day   lenalidomide (REVLIMID) 5 MG CAPS  Self No No   Sig: Take 5 mg every other day for 3 weeks on and 3 weeks off [facility]# [id number] 12/9/19   levothyroxine 125 mcg tablet  Self No No   Sig: Take 1 tablet (125 mcg total) by mouth daily   loperamide (IMODIUM) 2 mg capsule  Self No No   Sig: Take 1 capsule (2 mg total) by mouth 4 (four) times a day as needed for diarrhea   metoprolol tartrate (LOPRESSOR) 50 mg tablet  Self No No   Sig: TAKE 1 TABLET(50 MG TOTAL) BY MOUTH TWICE DAILY   ondansetron (ZOFRAN) 4 mg tablet  Self No No   Sig: Take 1 tablet (4 mg total) by mouth every 6 (six) hours as needed for nausea or vomiting   pravastatin (PRAVACHOL) 80 mg tablet  Self No No   Sig: TAKE 1 TABLET BY MOUTH DAILY   predniSONE 5 mg tablet  Self No No   Sig: Take 1 tablet (5 mg total) by mouth daily   rOPINIRole (REQUIP) 0 25 mg tablet  Self No No   Sig: TAKE 1 TABLET BY MOUTH TWICE DAILY   sertraline (ZOLOFT) 100 mg tablet  Self No No   Sig: TAKE 2 TABLETS(200MG) BY MOUTH DAILY  DAYS   sevelamer (RENAGEL) 800 mg tablet  Self No No   Sig: Take 2 tablets (1,600 mg total) by mouth 3 (three) times a day with meals   sodium bicarbonate 650 mg tablet  Self No No   Sig: TAKE 2 TABLETS BY MOUTH THREE TIMES DAILY   Patient taking differently: 1,300 mg 3 (three) times a day    spironolactone (ALDACTONE) 25 mg tablet  Self No No   Sig: Take 1 tablet (25 mg total) by mouth daily   tacrolimus (PROGRAF) 1 mg capsule  Self No No   Sig: Take 2 capsules (2 mg total) by mouth every 12 (twelve) hours   torsemide (DEMADEX) 20 mg tablet  Self No No   Sig: Take 1 tablet (20 mg total) by mouth daily      Facility-Administered Medications: None       Past Medical History:   Diagnosis Date    Abnormal liver function test     [facility]     Acute on chronic congestive heart failure (HCC)     Allergic urticaria     Anemia     Cancer (HCC)     Multiple myeloma    Cervical dysplasia     Cholelithiasis     Chronic diastolic (congestive) heart failure ([facility] ) 9/18/2017    [facility]     Previous, controlled with diet    Diabetes mellitus with foot ulcer ([facility]     Disease of thyroid gland     Encephalopathy     Hematuria     + leak est- secondary to UTIs/panc drainage    History of transfusion     Hyperkalemia     Hypertension     Iliotibial band syndrome     Lumbar radiculopathy     Multiple myeloma (HCC)     Multiple myeloma ([facility]     Night blindness     Nonrheumatic aortic (valve) insufficiency     Pneumonia     Renal disorder     Retinopathy     Seborrhea     Seizure ([facility]     Shingles     Sinus tachycardia     B blocker - cardio echo stress test 02 normal/neg LE doppler 2/02 OK and 12/07    Status post simultaneous kidney and pancreas transplant ([facility] )     Toe amputation status     Trochanteric bursitis        Past Surgical History:   Procedure Laterality Date    CATARACT EXTRACTION      CHOLECYSTECTOMY      COLONOSCOPY      two polyps in the rectum removed and biopsied diverticulosis in the sigmoid colon, external hemorrhoiods- Dr Barfield    COMBINED KIDNEY-PANCREAS TRANSPLANT N/A     CT BONE MARROW BIOPSY AND ASPIRATION  4/17/2019    CYSTOSCOPY N/A 10/13/2016    Procedure: CYSTOSCOPY, retrograde pyelogram, biopsy of ureteral polyp;   Surgeon: Garret Payne MD;  [facility]: [facility]; Service:     DILATION AND CURETTAGE OF UTERUS      ESOPHAGOGASTRODUODENOSCOPY N/A 2017    Procedure: ESOPHAGOGASTRODUODENOSCOPY (EGD); Surgeon: Ashok Ferrera DO;  Location: [facility]; Service: Gastroenterology    EYE SURGERY      cataracts    FOOT AMPUTATION THROUGH METATARSAL Left     FOOT SURGERY Right     excision of metatarsal heads    HALLUX VALGUS CORRECTION Right     NEPHRECTOMY TRANSPLANTED ORGAN      PANCREATIC TRANSPLANT REMOVAL         Family History   Problem Relation Age of Onset    Hypertension Mother     Cancer Mother     Hypertension Father     Cancer Father     Cancer Maternal Grandfather     Cancer Paternal Grandmother     Cancer Paternal Grandfather     Depression Sister     Breast cancer Maternal Grandmother 77     I have reviewed and agree with the history as documented  Social History     Tobacco Use    Smoking status: Former Smoker     Last attempt to quit: 2012     Years since quittin 7    Smokeless tobacco: Never Used   Substance Use Topics    Alcohol use: Never     Frequency: Never     Binge frequency: Never     Comment: (history)    Drug use: Never     Types: Hydrocodone     Comment: Past cocaine use many years ago - no current use        Review of Systems   Constitutional: Positive for fatigue  Negative for activity change, appetite change, chills, diaphoresis and fever  HENT: Negative  Negative for congestion, nosebleeds, rhinorrhea, sinus pain, sore throat, trouble swallowing and voice change  Eyes: Negative  Negative for photophobia and visual disturbance  Respiratory: Negative  Negative for cough, chest tightness, shortness of breath, wheezing and stridor  Cardiovascular: Negative  Negative for chest pain, palpitations and leg swelling  Gastrointestinal: Negative  Negative for abdominal pain, diarrhea, nausea and vomiting  Endocrine: Negative  Genitourinary: Negative    Negative for difficulty urinating, dysuria, flank pain, frequency, urgency, vaginal bleeding, vaginal discharge and vaginal pain  Musculoskeletal: Negative  Negative for back pain, neck pain and neck stiffness  Skin: Negative  Negative for rash and wound  Allergic/Immunologic: Negative  Neurological: Positive for weakness  Negative for dizziness, tremors, seizures, syncope, facial asymmetry, speech difficulty, light-headedness, numbness and headaches  Hematological: Negative  Does not bruise/bleed easily  Psychiatric/Behavioral: Negative  Negative for confusion  Physical Exam  Physical Exam   Constitutional: She is oriented to person, place, and time  She appears well-developed and well-nourished  No distress  Nontoxic appearance  No respiratory distress  Patient looks comfortable sitting upright in the stretcher  HENT:   Head: Normocephalic and atraumatic  Right Ear: External ear normal    Left Ear: External ear normal    Mouth/Throat: Oropharynx is clear and moist    Eyes: Pupils are equal, round, and reactive to light  Conjunctivae and EOM are normal    Neck: Normal range of motion  Neck supple  Cardiovascular: Normal rate, regular rhythm, normal heart sounds and intact distal pulses  Pulmonary/Chest: Effort normal and breath sounds normal  No stridor  No respiratory distress  She has no wheezes  She has no rales  She exhibits no tenderness  Abdominal: Soft  Bowel sounds are normal  She exhibits no distension and no mass  There is no tenderness  There is no rebound and no guarding  No hernia  Musculoskeletal: Normal range of motion  She exhibits no edema, tenderness or deformity  Neurological: She is alert and oriented to person, place, and time  She has normal reflexes  She displays normal reflexes  No cranial nerve deficit or sensory deficit  She exhibits normal muscle tone  Coordination normal    Skin: Skin is warm and dry  Capillary refill takes less than 2 seconds  No rash noted  She is not diaphoretic   No erythema  Psychiatric: She has a normal mood and affect  Her behavior is normal  Judgment and thought content normal    Nursing note and vitals reviewed  Vital Signs  ED Triage Vitals [01/17/20 1413]   Temperature Pulse Respirations Blood Pressure SpO2   97 8 °F (36 6 °C) 67 16 146/65 97 %      Temp Source Heart Rate Source Patient Position - Orthostatic VS BP Location FiO2 (%)   Oral Monitor Sitting Right arm --      Pain Score       No Pain           Vitals:    01/17/20 1413   BP: 146/65   Pulse: 67   Patient Position - Orthostatic VS: Sitting         Visual Acuity      ED Medications  Medications   sodium chloride 0 9 % infusion (has no administration in time range)       Diagnostic Studies  Results Reviewed     Procedure Component Value Units Date/Time    CBC and differential [[id number]] Collected:  01/17/20 1516    Lab Status: In process Specimen:  Blood from Arm, Right Updated:  01/17/20 8533    Basic metabolic panel [[id number]] Collected:  01/17/20 1516    Lab Status: In process Specimen:  Blood from Arm, Right Updated:  01/17/20 1519    Protime-INR [[id number]] Collected:  01/17/20 1516    Lab Status: In process Specimen:  Blood from Arm, Right Updated:  01/17/20 1519    APTT [[id number]] Collected:  01/17/20 1516    Lab Status: In process Specimen:  Blood from Arm, Right Updated:  01/17/20 1519                 No orders to display              Procedures  Procedures         ED Course  ED Course as of Jan 17 1529 Fri Jan 17, 2020   3886 Basic metabolic panel   8007 Case discussed with Anju Bacon of the hospitalist service was accepted this patient  Patient typed and crossed which is pending  Blood consent transfusion sign  Will be admitted to the hospitalist service                                    MDM      Disposition  Final diagnoses:   Anemia due to stage 3 chronic kidney disease ([facility] )   EDUARDO (acute kidney injury) ([facility] )     Time reflects when diagnosis was documented in both MDM as applicable and the Disposition within this note     Time User Action Codes Description Comment    1/17/2020  3:14 PM Durga Tavarez Add [N18 3,  D63 1] Anemia due to stage 3 chronic kidney disease ([facility] )     1/17/2020  3:14 PM Mojgan Harmon Add [N17 9] EDUARDO (acute kidney injury) [facility])       ED Disposition     ED Disposition Condition Date/Time Comment    Admit Stable Fri Jan 17, 2020  3:14 PM Case was discussed with Dr Gala Santos  and the patient's admission status was agreed to be Admission Status: inpatient status to the service of Dr Hayden Rojas    None         Patient's Medications   Discharge Prescriptions    No medications on file     No discharge procedures on file      ED Provider  Electronically Signed by           Mariella Sanz MD  01/17/20 9191

## 2020-01-17 NOTE — ASSESSMENT & PLAN NOTE
Wt Readings from Last 3 Encounters:   01/17/20 99 2 kg (218 lb 11 1 oz)   01/17/20 99 3 kg (219 lb)   01/08/20 100 kg (221 lb)   ·  Echocardiogram most recently with normal ejection fraction, grade 1 diastolic dysfunction with no wall motion abnormalities, moderate aortic regurgitation and trace pericardial effusion  · Last admission patient found to be in acute on chronic diastolic heart failure and was diuresed by Nephrology and received IV albumin and switch to p o   Torsemide  · Diuretics are on hold in the setting of acute renal failure

## 2020-01-17 NOTE — PROGRESS NOTES
Chief Complaint   Patient presents with    Transition of Care Management     12/9/19-1/2/20 Acute on chronic diastolic congestive heart failure [facility])     Health Maintenance   Topic Date Due    Medicare Annual Wellness Visit (AWV)  1965    DTaP,Tdap,and Td Vaccines (1 - Tdap) 03/08/1975    HIV Screening  03/08/1979    Hepatitis B Vaccine (1 of 3 - Risk 3-dose series) 03/08/1983    Cervical Cancer Screening  03/08/1985    Pneumococcal Vaccine: Pediatrics (0 to 5 Years) and At-Risk Patients (6 to 59 Years) (3 of 3 - PCV13) 10/06/2018    DXA SCAN  10/17/2019    BMI: Followup Plan  05/29/2020    HEMOGLOBIN A1C  06/28/2020    Diabetic Foot Exam  07/01/2020    DM Eye Exam  08/09/2020    URINE MICROALBUMIN  01/08/2021    BMI: Adult  01/17/2021    MAMMOGRAM  08/12/2021    CRC Screening: Colonoscopy  08/07/2023    Pneumococcal Vaccine: 65+ Years (1 of 2 - PCV13) 03/08/2029    Hepatitis C Screening  Completed    Influenza Vaccine  Completed    HIB Vaccine  Aged Out    IPV Vaccine  Aged Out    Hepatitis A Vaccine  Aged Out    Meningococcal ACWY Vaccine  Aged Out    HPV Vaccine  Aged Out     Assessment/Plan:     Patient presents for TCM visit  She was hospitalized at [address] [place] [address] 12/9/19 -1/2/20 for acute on chronic diastolic congestive heart failure  Renal transplant recipient  Patient is on chronic immunosuppression with Tacrolimus and Prednisone  Follow- up with nephrology Dr Ivelisse Simpson  Acute on chronic diastolic congestive heart failure (HCC)  Wt Readings from Last 3 Encounters:   01/17/20 98 9 kg (218 lb)   01/17/20 99 3 kg (219 lb)   01/08/20 100 kg (221 lb)     Venous Doppler BL LE was negative for DVT  Echocardiogram showed normal ejection fraction, moderate aortic regurgitation, trace pericardial infusion  Patient was discharged home on Torsemide 20 mg daily  Continue to follow a low-sodium diet  Check daily weights        Schedule follow-up visit with [facility] cardiology  Chronic kidney disease, stage 4 (severe) ([facility] )  Blood work done on January 8, 2020 showed creatinine 3 10  Patient had blood work done this morning ordered by nephrology Dr Lucinda Gu, results are pending  Avoid NSAID's, nephrotoxic drugs  Essential hypertension  Patient monitors blood pressure at home daily  BP at home this morning was 142//40  Continue current medical regimen  Acquired hypothyroidism  Continue Levothyroxine 125 mcg daily  Check TSH level in 3 months  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 [facility])    Lab Results   Component Value Date    HGBA1C 4 9 12/28/2019     Lantus was d/c to avoid hypoglycemia  Recommended to continue checking blood sugar at home daily  Schedule follow-up office visit with endocrinologist Dr Romelia Apodaca  Check eye exam by ophthalmologist annually  Follow- up with podiatry for routine foot care  Multiple myeloma not having achieved remission [facility])  Management per hematology- oncology Dr Sixto Boas  Blood done this morning, results are pending  Ambulatory dysfunction  Discussed fall precautions  Continue to use a cane for ambulation  Start outpatient PT  Schedule follow-up visit in 3 months  Diagnoses and all orders for this visit:    Renal transplant recipient    Acute on chronic diastolic congestive heart failure (HCC)    Chronic kidney disease, stage 4 (severe) ([facility])    Essential hypertension    Acquired hypothyroidism  -     TSH, 3rd generation with Free T4 reflex; Future    Controlled type 1 diabetes mellitus with diabetic polyneuropathy ([facility] )    Multiple myeloma not having achieved remission [facility])    Ambulatory dysfunction         Subjective:     Patient ID: Patrick Spain is a 53 y o  female  HPI     Patient presents for TCM visit accompanied by her father        She was hospitalized at [address] [place] [address] 12/9/19 -1/2/20 for acute on chronic diastolic congestive heart failure  Reviewed hospital records, test results, discharge medications with patient  Past medical history significant for CKD stage 4, S/P kidney and pancreatic transplant in 1998, Type 1 DM,  HTN, CHF, metabolic acidosis, multiple myeloma, depression, Hypothyroidism  Patient has been followed by nephrology Dr Flor Finley, hematology oncology doctor for room  Patient states that she feels fatigued, c/o some weakness  No fever or chills  Denies abdominal pain  No urinary symptoms  C/o having intermittent diarrhea and constipation  BP at home this morning was 142/40  Denies chest pain, heart palpitations  C/o lightheadedness, mild stable exertional shortness of breath  Blood work done on January 8, 2020 showed creatinine 3 10, glucose 96, potassium 5 2, Hemoglobin 7 4  Patient had blood work done this morning ordered by nephrology Dr Flor Finley, results are pending  Patient with start outpatient PT  Ambulates with a cane  Review of Systems   Constitutional: Positive for fatigue  Negative for activity change, appetite change, chills and fever  HENT: Negative for congestion, ear pain, hearing loss, mouth sores, nosebleeds, sore throat, tinnitus and trouble swallowing  Eyes: Negative for pain, discharge, redness, itching and visual disturbance  Respiratory: Positive for shortness of breath (mild stable exertional SOB)  Negative for cough, chest tightness and wheezing  Cardiovascular: Negative for chest pain, palpitations and leg swelling  Gastrointestinal: Positive for constipation and diarrhea  Negative for abdominal pain, blood in stool, nausea and vomiting  Genitourinary: Negative for difficulty urinating, dysuria, flank pain, frequency, hematuria and pelvic pain  Musculoskeletal: Positive for arthralgias and gait problem (patient ambulates with a cane)  Negative for back pain, joint swelling and neck pain  Skin: Negative for rash and wound  Neurological: Positive for light-headedness and numbness (in feet)  Negative for syncope and headaches  Hematological: Negative for adenopathy  Bruises/bleeds easily  Psychiatric/Behavioral: Positive for sleep disturbance  Negative for suicidal ideas  Depression/Anxiety - mood has been stable         Objective:     Physical Exam   Constitutional: She appears well-developed and well-nourished  No distress  HENT:   Head: Normocephalic and atraumatic  Right Ear: External ear normal    Left Ear: External ear normal    Mouth/Throat: Oropharynx is clear and moist    Eyes: Pupils are equal, round, and reactive to light  Conjunctivae are normal    Neck: Normal range of motion  Neck supple  No JVD present  Cardiovascular: Normal rate and regular rhythm  Murmur (systolic murmur at R USB) heard  No BL LE edema   Pulmonary/Chest: Effort normal and breath sounds normal    Abdominal: Soft  Bowel sounds are normal  There is no tenderness  Musculoskeletal:   Patient ambulates with a cane     Skin: Skin is warm and dry  There is pallor  Psychiatric: She has a normal mood and affect  Nursing note and vitals reviewed  Vitals:    01/17/20 1118   BP: (!) 150/40   BP Location: Left arm   Patient Position: Sitting   Cuff Size: Large   Pulse: 70   Resp: 16   Temp: (!) 97 3 °F (36 3 °C)   TempSrc: Tympanic   SpO2: 97%   Weight: 99 3 kg (219 lb)   Height: 5' 7" (1 702 m)       Transitional Care Management Review: Jeanine Domingo is a 53 y o  female here for TCM follow up       During the TCM phone call patient stated:    TCM Call (since 12/17/2019)     Date and time call was made  1/2/2020  6:07 PM    Hospital care reviewed  Records reviewed    Patient was hospitialized at  [facility]    Date of Admission  12/09/19    Date of discharge  01/02/20    Diagnosis  Acute on chronic diastolic congestive heart failure ([address] )    Disposition  Home    Were the patients medications reviewed and updated  Yes Current Symptoms  None      TCM Call (since 12/17/2019)     Post hospital issues  None    Should patient be enrolled in anticoag monitoring? No    Scheduled for follow up?   Yes <img src='C:FILES (X86)    Did you obtain your prescribed medications  Yes    Do you need help managing your prescriptions or medications  No    Is transportation to your appointment needed  Yes    I have advised the patient to call PCP with any new or worsening symptoms  Jonah Honeycutt, [facility] members    Support System  Family    The type of support provided  Physical; Emotional; Financial    Do you have social support  Yes, as much as I need    Are you recieving any outpatient services  No    Are you recieving home care services  No    Are you using any community resources  No    Current waiver services  No    Have you fallen in the last 12 months  No    Interperter language line needed  No    Counseling  Patient          Clarita Jones MD

## 2020-01-17 NOTE — ASSESSMENT & PLAN NOTE
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

## 2020-01-17 NOTE — ASSESSMENT & PLAN NOTE
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

## 2020-01-17 NOTE — TELEPHONE ENCOUNTER
Renal note  Received critical lab result with hemoglobin 6 5  Reviewed the chart and the patient's labs  Creatinine is also significantly higher of 4 2 mg/dL  Called the patient  Patient states that she is feeling well in no complaints currently    I asked the patient to go to the hospital for further evaluation and care given the decreased hemoglobin and significantly rising creatinine  Patient is reluctant to go but states that she agrees  She will call her father for ride    She will call us back to let us know which hospital she can go to

## 2020-01-17 NOTE — CONSULTS
Consultation - Nephrology   Georgette Peng 53 y o  female MRN: [id number]  Unit/Bed#: S -01 Encounter: [id number]    ASSESSMENT:  1  Acute Kidney Injury, POA- likely secondary to prerenal volume depletion due to anemia  - UA: 2+ protein, no blood, large leuks, innumerable wbc (no symptoms of UTI)  - UPC ratio: 4 15  - PVR: pending  - monitor with 2 units PRBC today  - avoid hypotension  - avoid nephrotoxins  - agree with holding diuretics  - no acute need for IVF at this time  - no acute indication for dialysis  2  Chronic Kidney Disease stage IV- Baseline creatinine is 2 5-3  Follows with Dr Elizabeth Ames  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  3  Status post kidney pancreas transplant in 1998  - immunosuppression: prednisone 5mg daily, tacrolimus 2mg q12hr  - tacrolimus level: 4 9 on 1/8/2020  4  Anemia, symptomatic- agree with 2 units PRBC  - baseline hgb is in the mid 7s with multiple myeloma  - iron studies acceptable in December 2019  - rule out GIB  - avoid LEO due to multiple myeloma  5  CHF / Volume- appears euvolemic  - holding diuretics (torsemide/aldactone)  6  Hypertension with CKD4- initial BP acceptable, continue to monitor  - hold parameters on antihypertensives  7  Metabolic Acidosis- continue sodium bicarbonate tablets 1300mg TID  8  Hyperphosphatemia- continue renagel 1600mg TID with meals  9  Borderline Hyperkalemia- renal diet, monitor K   - off aldactone  10  Multiple Myeloma- follow up with Dr Micki Lozada outpatient    PLAN:  Agree with 2 units PRBC  Agree with holding diuretics  Check PVR    HISTORY OF PRESENT ILLNESS:  Requesting Physician: Maria M Hamilton MD  Reason for Consult: Sundar Hernandez is a 53y o  year old female who was admitted to [facility] after presenting for an elevated creatinine and low hemoglobin on outpatient labs    A renal consultation is requested today for assistance in the management of acute kidney injury on chronic kidney disease  The patient is well known to our group  She states she is feeling well overall except for some fatigue and cannot believe her kidney numbers are this bad  She denies SOB, N/V/D, urinary complaints, changes in medications, changes in appetite  PAST MEDICAL HISTORY:  Past Medical History:   Diagnosis Date    Abnormal liver function test     [facility]     Acute on chronic congestive heart failure (HCC)     Allergic urticaria     Anemia     Cancer (HCC)     Multiple myeloma    Cervical dysplasia     Cholelithiasis     [facility]congest[facility] 9/18/2017    Diabetes mellitus [facility]     Previous, controlled with diet    Diabetes mellitus with foot ulcer ([facility] )     Disease of thyroid gland     Encephalopathy     Hematuria     + leak est- secondary to UTIs/panc drainage    History of transfusion     Hyperkalemia     Hypertension     Iliotibial band syndrome     Lumbar radiculopathy     Multiple myeloma (HCC)     Multiple myeloma ([facility]     Night blindness     Nonrheumatic aortic (valve) insufficiency     Pneumonia     Renal disorder     Retinopathy     Seborrhea     Seizure ([facility]     Shingles     Sinus tachycardia     B blocker - cardio echo stress test 02 normal/neg LE doppler 2/02 OK and 12/07    Status post simultaneous kidney and pancreas transplant ([facility] )     Toe amputation status     Trochanteric bursitis        PAST SURGICAL HISTORY:  Past Surgical History:   Procedure Laterality Date    CATARACT EXTRACTION      CHOLECYSTECTOMY      COLONOSCOPY      two polyps in the rectum removed and biopsied diverticulosis in the sigmoid colon, external hemorrhoiods- Dr Barfield    COMBINED KIDNEY-PANCREAS TRANSPLANT N/A     CT BONE MARROW BIOPSY AND ASPIRATION  4/17/2019    CYSTOSCOPY N/A 10/13/2016    Procedure: CYSTOSCOPY, retrograde pyelogram, biopsy of ureteral polyp;   Surgeon: Tonja Espinoza MD;  Location: [facility];  Service:  DILATION AND CURETTAGE OF UTERUS      ESOPHAGOGASTRODUODENOSCOPY N/A 2017    Procedure: ESOPHAGOGASTRODUODENOSCOPY (EGD); Surgeon: Atiya Shannon DO;  Location: [facility]; Service: Gastroenterology    EYE SURGERY      cataracts    FOOT AMPUTATION THROUGH METATARSAL Left     FOOT SURGERY Right     excision of metatarsal heads    HALLUX VALGUS CORRECTION Right     NEPHRECTOMY TRANSPLANTED ORGAN      PANCREATIC TRANSPLANT REMOVAL         ALLERGIES:  Allergies   Allergen Reactions    Ixazomib Rash     Ninlaro    Revlimid [Lenalidomide] Rash    Cefadroxil      Tolerated cefepime     Latex Rash    Morphine Other (See Comments)     Other reaction(s): Other (See Comments)  projectile vomiting    Morphine And Related GI Intolerance    Myrbetriq [Mirabegron] Hives    Penicillins Hives     Other reaction(s):  Other (See Comments)  Respiratory Distress,hives  Tolerates cefazolin       SOCIAL HISTORY:  Social History     Substance and Sexual Activity   Alcohol Use Never    Frequency: Never    Binge frequency: Never    Comment: (history)     Social History     Substance and Sexual Activity   Drug Use Never    Types: Hydrocodone    Comment: Past cocaine use many years ago - no current use     Social History     Tobacco Use   Smoking Status Former Smoker    Last attempt to quit: 2012    Years since quittin 7   Smokeless Tobacco Never Used       FAMILY HISTORY:  Family History   Problem Relation Age of Onset    Hypertension Mother     Cancer Mother     Hypertension Father     Cancer Father     Cancer Maternal Grandfather     Cancer Paternal Grandmother     Cancer Paternal Grandfather     Depression Sister     Breast cancer Maternal Grandmother 77       MEDICATIONS:    Current Facility-Administered Medications:     sodium chloride 0 9 % infusion, 75 mL/hr, Intravenous, Continuous, Praful Aden PA-C    REVIEW OF SYSTEMS:  A complete 10 point review of systems was performed and found to be negative unless otherwise noted in the history of present illness  General: No fevers, chills  Cardiovascular:  No chest pain, + mild chronic leg edema  Respiratory: No cough, sputum production,  No shortness of breath  Gastrointestinal:  No nausea/vomiting,  No diarrhea,  No abdominal pain  Genitourinary: No hematuria  No foamy urine    No dysuria    PHYSICAL EXAM:  Current Weight: Weight - Scale: 99 2 kg (218 lb 11 1 oz)  First Weight: Weight - Scale: 99 2 kg (218 lb 11 1 oz)  Vitals:    01/17/20 1413 01/17/20 1606   BP: 146/65 157/71   BP Location: Right arm Right arm   Pulse: 67 60   Resp: 16 16   Temp: 97 8 °F (36 6 °C) 97 5 °F (36 4 °C)   TempSrc: Oral Oral   SpO2: 97% 97%   Weight: 99 2 kg (218 lb 11 1 oz)    Height: 5' 7" (1 702 m) 5' 7" (1 702 m)     No intake or output data in the 24 hours ending 01/17/20 1613  General: NAD  Skin: no rash  Eyes: anicteric  ENMT: mm moist  Neck: no rash  Respiratory: CTAB  CVS: RRR  Extremities: trace bilateral edema  GI: soft nt nd  Neuro: alert awake  Psych: mood and affect appropriate     Lab Results:   Results from last 7 days   Lab Units 01/17/20  1516 01/17/20  0758   WBC Thousand/uL 7 32 8 00   HEMOGLOBIN g/dL 6 5* 6 5*   HEMATOCRIT % 21 5* 21 1*   PLATELETS Thousands/uL 164 163   POTASSIUM mmol/L 5 1 4 9   CHLORIDE mmol/L 104 112*   CO2 mmol/L 20* 18*   BUN mg/dL 56* 49*   CREATININE mg/dL 4 42* 4 19*   CALCIUM mg/dL 8 7 8 4   MAGNESIUM mg/dL  --  2 9*   PHOSPHORUS mg/dL  --  4 6*   ALK PHOS U/L  --  95   ALT U/L  --  14   AST U/L  --  7

## 2020-01-17 NOTE — ASSESSMENT & PLAN NOTE
· Patient admitted with hemoglobin 6 5  Baseline hemoglobin mid 7 range with known his anemia of chronic disease and multiple myeloma    · Symptomatic with fatigue and chills  · Suspect this is due to worsening renal function, however will rule out GI bleeding and hemolysis  · Continue iron, folic acid - check levels  · Blood consent on chart  · Transfuse 2 units PRBCs now - hx of multiple autoantibodies

## 2020-01-17 NOTE — ASSESSMENT & PLAN NOTE
Lab Results   Component Value Date    HGBA1C 4 9 12/28/2019     No results for input(s): POCGLU in the last 72 hours    Blood Sugar Average: Last 72 hrs:  · Continue Lantus 5 units q h s , resume Toujeo at d/c  · SSI, accuchecks, ADA

## 2020-01-18 LAB
ANION GAP SERPL CALCULATED.3IONS-SCNC: 12 MMOL/L (ref 4–13)
BUN SERPL-MCNC: 57 MG/DL (ref 5–25)
CALCIUM SERPL-MCNC: 8.2 MG/DL (ref 8.3–10.1)
CHLORIDE SERPL-SCNC: 105 MMOL/L (ref 100–108)
CO2 SERPL-SCNC: 19 MMOL/L (ref 21–32)
CREAT SERPL-MCNC: 4.44 MG/DL (ref 0.6–1.3)
ERYTHROCYTE [DISTWIDTH] IN BLOOD BY AUTOMATED COUNT: 14.7 % (ref 11.6–15.1)
GFR SERPL CREATININE-BSD FRML MDRD: 10 ML/MIN/1.73SQ M
GLUCOSE SERPL-MCNC: 108 MG/DL (ref 65–140)
GLUCOSE SERPL-MCNC: 109 MG/DL (ref 65–140)
GLUCOSE SERPL-MCNC: 117 MG/DL (ref 65–140)
GLUCOSE SERPL-MCNC: 119 MG/DL (ref 65–140)
GLUCOSE SERPL-MCNC: 167 MG/DL (ref 65–140)
HCT VFR BLD AUTO: 19.2 % (ref 34.8–46.1)
HCT VFR BLD AUTO: 25.5 % (ref 34.8–46.1)
HGB BLD-MCNC: 5.8 G/DL (ref 11.5–15.4)
HGB BLD-MCNC: 8.1 G/DL (ref 11.5–15.4)
MAGNESIUM SERPL-MCNC: 2.7 MG/DL (ref 1.6–2.6)
MCH RBC QN AUTO: 32.6 PG (ref 26.8–34.3)
MCHC RBC AUTO-ENTMCNC: 30.2 G/DL (ref 31.4–37.4)
MCV RBC AUTO: 108 FL (ref 82–98)
PHOSPHATE SERPL-MCNC: 5.3 MG/DL (ref 2.7–4.5)
PLATELET # BLD AUTO: 151 THOUSANDS/UL (ref 149–390)
PMV BLD AUTO: 12.6 FL (ref 8.9–12.7)
POTASSIUM SERPL-SCNC: 4.8 MMOL/L (ref 3.5–5.3)
RBC # BLD AUTO: 1.78 MILLION/UL (ref 3.81–5.12)
SODIUM SERPL-SCNC: 136 MMOL/L (ref 136–145)
WBC # BLD AUTO: 7.63 THOUSAND/UL (ref 4.31–10.16)

## 2020-01-18 PROCEDURE — 85018 HEMOGLOBIN: CPT | Performed by: PHYSICIAN ASSISTANT

## 2020-01-18 PROCEDURE — 80048 BASIC METABOLIC PNL TOTAL CA: CPT | Performed by: PHYSICIAN ASSISTANT

## 2020-01-18 PROCEDURE — 85027 COMPLETE CBC AUTOMATED: CPT | Performed by: PHYSICIAN ASSISTANT

## 2020-01-18 PROCEDURE — 85014 HEMATOCRIT: CPT | Performed by: PHYSICIAN ASSISTANT

## 2020-01-18 PROCEDURE — 99232 SBSQ HOSP IP/OBS MODERATE 35: CPT | Performed by: INTERNAL MEDICINE

## 2020-01-18 PROCEDURE — P9040 RBC LEUKOREDUCED IRRADIATED: HCPCS

## 2020-01-18 PROCEDURE — 99223 1ST HOSP IP/OBS HIGH 75: CPT | Performed by: INTERNAL MEDICINE

## 2020-01-18 PROCEDURE — 83735 ASSAY OF MAGNESIUM: CPT | Performed by: PHYSICIAN ASSISTANT

## 2020-01-18 PROCEDURE — 86902 BLOOD TYPE ANTIGEN DONOR EA: CPT

## 2020-01-18 PROCEDURE — 84100 ASSAY OF PHOSPHORUS: CPT | Performed by: PHYSICIAN ASSISTANT

## 2020-01-18 PROCEDURE — 82948 REAGENT STRIP/BLOOD GLUCOSE: CPT

## 2020-01-18 PROCEDURE — 80197 ASSAY OF TACROLIMUS: CPT | Performed by: INTERNAL MEDICINE

## 2020-01-18 PROCEDURE — 99232 SBSQ HOSP IP/OBS MODERATE 35: CPT | Performed by: PHYSICIAN ASSISTANT

## 2020-01-18 RX ORDER — TORSEMIDE 20 MG/1
20 TABLET ORAL DAILY
Status: DISCONTINUED | OUTPATIENT
Start: 2020-01-18 | End: 2020-01-23 | Stop reason: HOSPADM

## 2020-01-18 RX ORDER — DIPHENHYDRAMINE HYDROCHLORIDE 50 MG/ML
25 INJECTION INTRAMUSCULAR; INTRAVENOUS EVERY 6 HOURS PRN
Status: DISCONTINUED | OUTPATIENT
Start: 2020-01-18 | End: 2020-01-23 | Stop reason: HOSPADM

## 2020-01-18 RX ADMIN — AMLODIPINE BESYLATE 10 MG: 10 TABLET ORAL at 08:25

## 2020-01-18 RX ADMIN — MELATONIN 3000 UNITS: at 08:25

## 2020-01-18 RX ADMIN — SENNOSIDES 8.6 MG: 8.6 TABLET, FILM COATED ORAL at 08:26

## 2020-01-18 RX ADMIN — TACROLIMUS 2 MG: 1 CAPSULE ORAL at 08:25

## 2020-01-18 RX ADMIN — DOCUSATE SODIUM 100 MG: 100 CAPSULE, LIQUID FILLED ORAL at 17:46

## 2020-01-18 RX ADMIN — CLONIDINE HYDROCHLORIDE 0.2 MG: 0.1 TABLET ORAL at 06:03

## 2020-01-18 RX ADMIN — ROPINIROLE HYDROCHLORIDE 0.25 MG: 0.25 TABLET, FILM COATED ORAL at 08:25

## 2020-01-18 RX ADMIN — DOXAZOSIN 2 MG: 1 TABLET ORAL at 21:33

## 2020-01-18 RX ADMIN — POLYETHYLENE GLYCOL 3350 17 G: 17 POWDER, FOR SOLUTION ORAL at 08:25

## 2020-01-18 RX ADMIN — SODIUM BICARBONATE 650 MG TABLET 1300 MG: at 08:25

## 2020-01-18 RX ADMIN — METOPROLOL TARTRATE 50 MG: 50 TABLET, FILM COATED ORAL at 20:01

## 2020-01-18 RX ADMIN — ASPIRIN 81 MG 81 MG: 81 TABLET ORAL at 08:25

## 2020-01-18 RX ADMIN — SODIUM BICARBONATE 650 MG TABLET 1300 MG: at 16:17

## 2020-01-18 RX ADMIN — DAPTOMYCIN 450 MG: 500 INJECTION, POWDER, LYOPHILIZED, FOR SOLUTION INTRAVENOUS at 14:59

## 2020-01-18 RX ADMIN — LEVOTHYROXINE SODIUM 125 MCG: 125 TABLET ORAL at 06:03

## 2020-01-18 RX ADMIN — BUSPIRONE HYDROCHLORIDE 5 MG: 5 TABLET ORAL at 08:25

## 2020-01-18 RX ADMIN — ARIPIPRAZOLE 30 MG: 10 TABLET ORAL at 21:33

## 2020-01-18 RX ADMIN — DOCUSATE SODIUM 100 MG: 100 CAPSULE, LIQUID FILLED ORAL at 08:25

## 2020-01-18 RX ADMIN — TACROLIMUS 2 MG: 1 CAPSULE ORAL at 20:01

## 2020-01-18 RX ADMIN — HYDRALAZINE HYDROCHLORIDE 50 MG: 25 TABLET ORAL at 20:01

## 2020-01-18 RX ADMIN — DULOXETINE HYDROCHLORIDE 60 MG: 60 CAPSULE, DELAYED RELEASE ORAL at 08:25

## 2020-01-18 RX ADMIN — FOLIC ACID 1000 MCG: 1 TABLET ORAL at 08:25

## 2020-01-18 RX ADMIN — CLONIDINE HYDROCHLORIDE 0.2 MG: 0.1 TABLET ORAL at 21:33

## 2020-01-18 RX ADMIN — PRAVASTATIN SODIUM 80 MG: 80 TABLET ORAL at 16:17

## 2020-01-18 RX ADMIN — HYDRALAZINE HYDROCHLORIDE 50 MG: 25 TABLET ORAL at 08:25

## 2020-01-18 RX ADMIN — CLONIDINE HYDROCHLORIDE 0.2 MG: 0.1 TABLET ORAL at 13:49

## 2020-01-18 RX ADMIN — SERTRALINE HYDROCHLORIDE 200 MG: 100 TABLET ORAL at 08:25

## 2020-01-18 RX ADMIN — SEVELAMER HYDROCHLORIDE 1600 MG: 800 TABLET, FILM COATED PARENTERAL at 11:33

## 2020-01-18 RX ADMIN — SODIUM BICARBONATE 650 MG TABLET 1300 MG: at 20:01

## 2020-01-18 RX ADMIN — SEVELAMER HYDROCHLORIDE 1600 MG: 800 TABLET, FILM COATED PARENTERAL at 16:17

## 2020-01-18 RX ADMIN — BUSPIRONE HYDROCHLORIDE 5 MG: 5 TABLET ORAL at 17:46

## 2020-01-18 RX ADMIN — ACETAMINOPHEN 650 MG: 325 TABLET, FILM COATED ORAL at 11:04

## 2020-01-18 RX ADMIN — ROPINIROLE HYDROCHLORIDE 0.25 MG: 0.25 TABLET, FILM COATED ORAL at 17:46

## 2020-01-18 RX ADMIN — INSULIN LISPRO 1 UNITS: 100 INJECTION, SOLUTION INTRAVENOUS; SUBCUTANEOUS at 11:32

## 2020-01-18 RX ADMIN — TORSEMIDE 20 MG: 20 TABLET ORAL at 16:17

## 2020-01-18 RX ADMIN — INSULIN GLARGINE 4 UNITS: 100 INJECTION, SOLUTION SUBCUTANEOUS at 21:33

## 2020-01-18 RX ADMIN — METOPROLOL TARTRATE 50 MG: 50 TABLET, FILM COATED ORAL at 08:25

## 2020-01-18 RX ADMIN — HYDRALAZINE HYDROCHLORIDE 50 MG: 25 TABLET ORAL at 16:17

## 2020-01-18 RX ADMIN — PREDNISONE 5 MG: 5 TABLET ORAL at 08:25

## 2020-01-18 RX ADMIN — FERROUS SULFATE TAB 325 MG (65 MG ELEMENTAL FE) 325 MG: 325 (65 FE) TAB at 08:25

## 2020-01-18 RX ADMIN — SEVELAMER HYDROCHLORIDE 1600 MG: 800 TABLET, FILM COATED PARENTERAL at 08:26

## 2020-01-18 RX ADMIN — DULOXETINE HYDROCHLORIDE 60 MG: 60 CAPSULE, DELAYED RELEASE ORAL at 17:46

## 2020-01-18 NOTE — ASSESSMENT & PLAN NOTE
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

## 2020-01-18 NOTE — ASSESSMENT & PLAN NOTE
· Patient admitted with hemoglobin 6 5  Baseline hemoglobin mid 7 range with known his anemia of chronic disease and multiple myeloma  Hgb now 5 8   · May be due to worsening renal function, however will rule out GI bleeding and hemolysis  · FOBT pending   · LDH normal  Haptoglobin, hemolysis smear pending   · Iron saturation 30%, TIBC 215, iron 65  · Continue iron, folic acid    · Blood consent on chart  Transfuse 2 units PRBCs now - hx of multiple autoantibodies and still hasn't received blood ordered 1/17   · Discussed with pathologist Dr Rupert Hu, blood bank, inpatient lab, Dr Ana Navarro, and MICHELLE Angela  · 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 and has known antibodies to blood  Blood bank only able to match for clinically significant antibodies and due to warm autoantibody, unable to test for less clinically significant antibodies  · Blood on hold currently is least incompatible based on current and previous testing  We are unable to exclude the possibility of other underlying antibodies that we do not routinely check for due to warm auto antibody  · Patient expressed understanding and agrees to 2 units of least incompatible blood at this time  Last transfusion on 12/05 also least incompatible  Deviation form signed by myself and Dr Ana Navarro     · Prn tylenol and benadryl ordered

## 2020-01-18 NOTE — PLAN OF CARE
Problem: Potential for Falls  Goal: Patient will remain free of falls  Description  INTERVENTIONS:  - Assess patient frequently for physical needs  -  Identify cognitive and physical deficits and behaviors that affect risk of falls    -  [place] fall precautions as indicated by assessment   - Educate patient/family on patient safety including physical limitations  - Instruct patient to call for assistance with activity based on assessment  - Modify environment to reduce risk of injury  - Consider OT/PT consult to assist with strengthening/mobility  Outcome: Progressing     Problem: PAIN - ADULT  Goal: Verbalizes/displays adequate comfort level or baseline comfort level  Description  Interventions:  - Encourage patient to monitor pain and request assistance  - Assess pain using appropriate pain scale  - Administer analgesics based on type and severity of pain and evaluate response  - Implement non-pharmacological measures as appropriate and evaluate response  - Consider cultural and social influences on pain and pain management  - Notify physician/advanced practitioner if interventions unsuccessful or patient reports new pain  Outcome: Progressing     Problem: INFECTION - ADULT  Goal: Absence or prevention of progression during hospitalization  Description  INTERVENTIONS:  - Assess and monitor for signs and symptoms of infection  - Monitor lab/diagnostic results  - Monitor all insertion sites, i e  indwelling lines, tubes, and drains  - Monitor endotracheal if appropriate and nasal secretions for changes in amount and color  - [place] appropriate cooling/warming therapies per order  - Administer medications as ordered  - Instruct and encourage patient and family to use good hand hygiene technique  - Identify and instruct in appropriate isolation precautions for identified infection/condition  Outcome: Progressing  Goal: Absence of fever/infection during neutropenic period  Description  INTERVENTIONS:  - Monitor WBC    Outcome: Progressing     Problem: SAFETY ADULT  Goal: Maintain or return to baseline ADL function  Description  INTERVENTIONS:  -  Assess patient's ability to carry out ADLs; assess patient's baseline for ADL function and identify physical deficits which impact ability to perform ADLs (bathing, care of mouth/teeth, toileting, grooming, dressing, etc )  - Assess/evaluate cause of self-care deficits   - Assess range of motion  - Assess patient's mobility; develop plan if impaired  - Assess patient's need for assistive devices and provide as appropriate  - Encourage maximum independence but intervene and supervise when necessary  - Involve family in performance of ADLs  - Assess for home care needs following discharge   - Consider OT consult to assist with ADL evaluation and planning for discharge  - Provide patient education as appropriate  Outcome: Progressing  Goal: Maintain or return mobility status to optimal level  Description  INTERVENTIONS:  - Assess patient's baseline mobility status (ambulation, transfers, stairs, etc )    - Identify cognitive and physical deficits and behaviors that affect mobility  - Identify mobility aids required to assist with transfers and/or ambulation (gait belt, sit-to-stand, lift, walker, cane, etc )  - [place] fall precautions as indicated by assessment  - Record patient progress and toleration of activity level on Mobility SBAR; progress patient to next Phase/Stage  - Instruct patient to call for assistance with activity based on assessment  - Consider rehabilitation consult to assist with strengthening/weightbearing, etc   Outcome: Progressing     Problem: DISCHARGE PLANNING  Goal: Discharge to home or other facility with appropriate resources  Description  INTERVENTIONS:  - Identify barriers to discharge w/patient and caregiver  - Arrange for needed discharge resources and transportation as appropriate  - Identify discharge learning needs (meds, wound care, etc )  - Arrange for interpretive services to assist at discharge as needed  - Refer to Case Management Department for coordinating discharge planning if the patient needs post-hospital services based on physician/advanced practitioner order or complex needs related to functional status, cognitive ability, or social support system  Outcome: Progressing     Problem: Knowledge Deficit  Goal: Patient/family/caregiver demonstrates understanding of disease process, treatment plan, medications, and discharge instructions  Description  Complete learning assessment and assess knowledge base    Interventions:  - Provide teaching at level of understanding  - Provide teaching via preferred learning methods  Outcome: Progressing

## 2020-01-18 NOTE — ASSESSMENT & PLAN NOTE
Lab Results   Component Value Date    HGBA1C 4 9 12/28/2019     Recent Labs     01/17/20  1715 01/17/20 2057 01/18/20  0744   POCGLU 232* 101 119     Blood Sugar Average: Last 72 hrs:  · (P) 424 [id number]   · Continue Lantus 5 units q h s , resume Toujeo at d/c  · SSI, accuchecks, ADA

## 2020-01-18 NOTE — ASSESSMENT & PLAN NOTE
Wt Readings from Last 3 Encounters:   01/17/20 98 9 kg (218 lb)   01/17/20 99 3 kg (219 lb)   01/08/20 100 kg (221 lb)     Venous Doppler BL LE was negative for DVT  Echocardiogram showed normal ejection fraction, moderate aortic regurgitation, trace pericardial infusion  Patient was discharged home on Torsemide 20 mg daily  Continue to follow a low-sodium diet  Check daily weights  Schedule follow-up visit with [facility] cardiology

## 2020-01-18 NOTE — ASSESSMENT & PLAN NOTE
Blood work done on January 8, 2020 showed creatinine 3 10  Patient had blood work done this morning ordered by nephrology Dr Natalie Price, results are pending  Avoid NSAID's, nephrotoxic drugs

## 2020-01-18 NOTE — CONSULTS
Consultation - Infectious Disease   Robert Stevenson 53 y o  female MRN: [id number]  Unit/Bed#: S -01 Encounter: [id number]      IMPRESSION & RECOMMENDATIONS:   Impression/Recommendations: This is a 53 y o  female, with multiple medical problems including status post renal transplant and recurrent UTI with MDR pathogens, was admitted yesterday with EDUARDO and symptomatic UTI  1  Symptomatic UTI  Patient has history of bladder colonization with MDR pathogens, most recently VRE  We have tried to keep patient off antibiotic as much as possible, and not treat her asymptomatic bacteriuria  Unfortunately, patient now has symptoms of UTI  Her UA is grossly abnormal and she will most likely have growth in urine culture  Abnormal UA and bacteriuria in themselves do not need treatment  However, given dysuria, it is difficult not to treat patient  Given VRE in the most recent urine culture, will initiate treatment for this, pending urine culture resolved  Patient is clinically and systemically well, without evidence of sepsis  Clinically, she has no evidence of pyelonephritis of transplanted kidney  Hopefully, we can keep antibiotic course brief  Start daptomycin empirically  Follow-up on urine culture  Monitor urinary symptoms  Monitor temperature/WBC  2  EDUARDO, superimposed on CKD  Etiology not entirely clear  Possible prerenal state from diuresis and decreased p o  fluid intake  Creatinine has not improved  Antibiotic dosages adjusted accordingly  Monitor creatinine  3  Status post renal transplant  No evidence of pyelonephritis of transplanted kidney  4  Multiple myeloma  Patient had been on Revlimid previously but on hold due to multiple admissions for acute illnesses recently  Discussed with patient in detail regarding the above plan  Discussed with slim service    Thank you for this consultation  We will follow along with you      HISTORY OF PRESENT ILLNESS:  Reason for Consult: UTI     HPI: Lolis Mcmillan is a 53 y o  female, status post renal transplant, with multiple admissions for recurrent UTI with MDR pathogens, was sent to the ER yesterday by her PCP when she was found to have a KI on routine blood work  On presentation, patient complains of dysuria for the last 2-3 days and decreased p o  fluid intake  UA was abnormal   We are asked to evaluate the patient  At present, patient states that her dysuria is unchanged from a few days ago  No flank pain  No pain over her transplanted kidney in the left pelvis  No chills at home  Patient was recently admitted here in November of last year with symptomatic VRE UTI  She was admitted in December with anaerobic GNR bacteremia, felt to be secondary to translocation from acute enterocolitis    REVIEW OF SYSTEMS:  A complete 12 point system-based review was done  Except for what is noted in HPI above, ROS of systems is otherwise negative      PAST MEDICAL HISTORY:  Past Medical History:   Diagnosis Date    Abnormal liver function test     Acute kidney injury ([address] )     Acute on chronic congestive heart failure (HCC)     Allergic urticaria     Anemia     Cancer (HCC)     Multiple myeloma    Cervical dysplasia     Cholelithiasis     Chronic diastolic (congestive) heart failure ([address] ) 9/18/2017    Diabetes mellitus ([address] )     Previous, controlled with diet    Diabetes mellitus with foot ulcer ([address] )     Disease of thyroid gland     Encephalopathy     Hematuria     + leak est- secondary to UTIs/panc drainage    History of transfusion     Hyperkalemia     Hypertension     Iliotibial band syndrome     Lumbar radiculopathy     Multiple myeloma (HCC)     Multiple myeloma (HCC)     Night blindness     Nonrheumatic aortic (valve) insufficiency     Pneumonia     Renal disorder     Retinopathy     Seborrhea     Seizure ([address] )     Shingles     Sinus tachycardia     B blocker - cardio echo stress test 02 normal/neg LE doppler  OK and     Status post simultaneous kidney and pancreas transplant ([address] )     Toe amputation status     Trochanteric bursitis      Past Surgical History:   Procedure Laterality Date    CATARACT EXTRACTION      CHOLECYSTECTOMY      COLONOSCOPY      two polyps in the rectum removed and biopsied diverticulosis in the sigmoid colon, external hemorrhoiods- Dr Barfeild    COMBINED KIDNEY-PANCREAS TRANSPLANT N/A     CT BONE MARROW BIOPSY AND ASPIRATION  2019    CYSTOSCOPY N/A 10/13/2016    Procedure: CYSTOSCOPY, retrograde pyelogram, biopsy of ureteral polyp; Surgeon: Kate Marquez MD;  Location: [facility];  Service:    Trevor Jameson DILATION AND CURETTAGE OF UTERUS      ESOPHAGOGASTRODUODENOSCOPY N/A 2017    Procedure: ESOPHAGOGASTRODUODENOSCOPY (EGD); Surgeon: Karol Hernandez DO;  Location: [facility]; Service: Gastroenterology    EYE SURGERY      cataracts    FOOT AMPUTATION THROUGH METATARSAL Left     FOOT SURGERY Right     excision of metatarsal heads    HALLUX VALGUS CORRECTION Right     NEPHRECTOMY TRANSPLANTED ORGAN      PANCREATIC TRANSPLANT REMOVAL       Problem list reviewed  FAMILY HISTORY:  Non-contributory    SOCIAL HISTORY:  Social History     Substance and Sexual Activity   Alcohol Use Never    Frequency: Never    Binge frequency: Never    Comment: (history)     Social History     Substance and Sexual Activity   Drug Use Never    Types: Hydrocodone    Comment: Past cocaine use many years ago - no current use     Social History     Tobacco Use   Smoking Status Former Smoker    Last attempt to quit: 2012    Years since quittin 7   Smokeless Tobacco Never Used       ALLERGIES:  Allergies   Allergen Reactions    Ixazomib Rash     Ninlaro    Revlimid [Lenalidomide] Rash    Cefadroxil      Tolerated cefepime     Latex Rash    Morphine Other (See Comments)     Other reaction(s):  Other (See Comments)  projectile vomiting    Morphine And Related GI Intolerance    Myrbetriq [Mirabegron] Hives    Penicillins Hives     Other reaction(s): Other (See Comments)  Respiratory Distress,hives  Tolerates cefazolin       MEDICATIONS:  All current active medications have been reviewed  Patient is currently not on any antibiotic  PHYSICAL EXAM:  Vitals:  Temp:  [97 5 °F (36 4 °C)-98 °F (36 7 °C)] 98 °F (36 7 °C)  HR:  [59-67] 61  Resp:  [16-18] 18  BP: (144-169)/(50-72) 169/72  SpO2:  [91 %-97 %] 96 %  Temp (24hrs), Av 8 °F (36 6 °C), Min:97 5 °F (36 4 °C), Max:98 °F (36 7 °C)  Current: Temperature: 98 °F (36 7 °C)     Physical Exam:  General:  Well-nourished, well-developed, chronically ill-appearing, in no acute distress  Awake, alert and oriented x 3  Eyes:  Conjunctive clear with no hemorrhages or effusions  Oropharynx:  No ulcers, no lesions, pharynx benign, no tonsillitis  Neck:  Supple, no lymphadenopathy, no mass, nontender  Lungs:  Expansion symmetric, no rales, no wheezing, no accessory muscle use  Cardiac:  Regular rate and rhythm, normal S1, normal S2, no murmurs  Abdomen:  Soft, nondistended, non-tender, no HSM  Extremities:  Trace edema, no erythema, nontender  No ulcers  Skin:  No rashes, no ulcers  Neurological:  Moves all four extremities spontaneously, sensation grossly intact    LABS, IMAGING, & OTHER STUDIES:  Lab Results:  I have personally reviewed pertinent labs    Results from last 7 days   Lab Units 20  0439 20  1516 20  0758   POTASSIUM mmol/L 4 8 5 1 4 9   CHLORIDE mmol/L 105 104 112*   CO2 mmol/L 19* 20* 18*   BUN mg/dL 57* 56* 49*   CREATININE mg/dL 4 44* 4 42* 4 19*   EGFR ml/min/1 73sq m 10 11 11   CALCIUM mg/dL 8 2* 8 7 8 4   AST U/L  --   --  7   ALT U/L  --   --  14   ALK PHOS U/L  --   --  95     Results from last 7 days   Lab Units 20  0439 20  1516 20  0758   WBC Thousand/uL 7 63 7 32 8 00   HEMOGLOBIN g/dL 5 8* 6 5* 6 5*   PLATELETS Thousands/uL 151 164 163           Imaging Studies:   I have personally reviewed pertinent imaging study reports and images in PACS  EKG, Pathology, and Other Studies:   I have personally reviewed pertinent reports

## 2020-01-18 NOTE — PROGRESS NOTES
Progress Note - Violet Small 1965, 53 y o  female MRN: [id number]  Unit/Bed#: S -01 Encounter: [id number] DOS: 1/18/20  Primary Care Provider: Rosalee Ruiz MD   Date and time admitted to hospital: 1/17/2020  2:09 PM    Acute renal failure superimposed on stage 4 chronic kidney disease ([facility] [address] )  Assessment & Plan  · Patient presents with acute renal failure superimposed on chronic kidney disease, creatinine 4 42 with baseline most recently 2 5-3 0  Creatinine now 4 44   · Possibly pre renal due to recent diuresis, frequent diarrhea at home on PO diuretics, and severe anemia  · 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 and follows Dr Claudean Bears  She is status post kidney and pancreas transplantation 1998 on prednisone 5 mg daily, tacrolimus 2 mg b i d   · Presented to hospital at the request of her outpatient nephrologist due to worsening creatinine and anemia  · Avoid hypotension, nephrotoxins  · Hold torsemide and Aldactone  · Continue sodium bicarb for met acidosis and phosphate binder for hyperphos   · Hold IVF for now     * Anemia  Assessment & Plan  · Patient admitted with hemoglobin 6 5  Baseline hemoglobin mid 7 range with known his anemia of chronic disease and multiple myeloma  Hgb now 5 8   · May be due to worsening renal function, however will rule out GI bleeding and hemolysis  · FOBT pending   · LDH normal  Haptoglobin, hemolysis smear pending   · Iron saturation 30%, TIBC 215, iron 65  · Continue iron, folic acid    · Blood consent on chart  Transfuse 2 units PRBCs now - hx of multiple autoantibodies and still hasn't received blood ordered 1/17   · Discussed with pathologist Dr Ashlee Monique, blood bank, inpatient lab, Dr Mac Lawton, and RN Mely Hernandez  · 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 and has known antibodies to blood    Blood bank only able to match for clinically significant antibodies and due to warm autoantibody, unable to test for less clinically significant antibodies  · Blood on hold currently is least incompatible based on current and previous testing  We are unable to exclude the possibility of other underlying antibodies that we do not routinely check for due to warm auto antibody  · Patient expressed understanding and agrees to 2 units of least incompatible blood at this time  Last transfusion on 12/05 also least incompatible  Deviation form signed by myself and Dr Parish Barclay  · Prn tylenol and benadryl ordered     Chronic diastolic congestive heart failure (HCC)  Assessment & Plan  Wt Readings from Last 3 Encounters:   01/18/20 98 7 kg (217 lb 9 5 oz)   01/17/20 99 3 kg (219 lb)   01/08/20 100 kg (221 lb)   · Echocardiogram most recently with normal ejection fraction, grade 1 diastolic dysfunction with no wall motion abnormalities, moderate aortic regurgitation and trace pericardial effusion  · Last admission patient found to be in acute on chronic diastolic heart failure and was diuresed by Nephrology and received IV albumin and switch to p o   Torsemide  · Diuretics are on hold in the setting of acute renal failure    Chronic constipation  Assessment & Plan  · Evaluated by Gastroenterology last admission for abdominal pain, constipation and fecal stasis  · Reports frequent diarrhea at home while on bowel regimen but now constipated   · Monitor stool output and treat diarrhea versus constipation accordingly  · Outpatient colonoscopy recommended    Benign hypertension with CKD (chronic kidney disease) stage IV ([facility])  Assessment & Plan  · Home regimen includes clonidine 0 2 mg t i d , hydralazine 50 mg Q 8, Cardura 20 mg at bedtime, Lopressor 50 mg twice daily, Norvasc 10 mg daily with hold parameters  · Hold Aldactone 25 mg daily and torsemide 20 mg daily in the setting of renal failure  · Avoid hypotension    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 [facility])  Assessment & Plan  Lab Results   Component Value Date    HGBA1C 4 9 12/28/2019     Recent Labs     01/17/20  1715 01/17/20 2057 01/18/20  0744   POCGLU 232* 101 119     Blood Sugar Average: Last 72 hrs:  · (P) 346 [id number]   · Continue Lantus 5 units q h s , resume Toujeo at d/c  · SSI, accuchecks, ADA     Asymptomatic bacteriuria  Assessment & Plan  · Known chronic bacteriuria with intermittent dysuria     Multiple myeloma not having achieved remission [facility])  Assessment & Plan  · Follows with Dr Sarah Kirkland for known history of IgG kappa multiple myeloma stage III diagnosed in April 2019 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  · Currently off Revlimid due to frequent readmissions to the hospital for volume overload, constipation/fecal impaction/colitis, urinary tract infection    Anxiety  Assessment & Plan  · Continue Cymbalta, Abilify, BuSpar, Requip, Zoloft, prn ativan     Atrial fibrillation (HCC)  Assessment & Plan  · Continue lopressor     Acquired hypothyroidism  Assessment & Plan  · Continue Synthroid    VTE Pharmacologic Prophylaxis:   Pharmacologic: Pharmacologic VTE Prophylaxis contraindicated due to acute anemia   Mechanical VTE Prophylaxis in Place: Yes    Patient Centered Rounds: I have performed bedside rounds with nursing staff today  Discussions with Specialists or Other Care Team Provider: nursing, Dr María Gimenez, Dr Yris Bazan, blood bank, inpatient lab     Education and Discussions with Family / Patient: patient     Time Spent for Care: 45 minutes  More than 50% of total time spent on counseling and coordination of care as described above      Current Length of Stay: 1 day(s)    Current Patient Status: Inpatient   Certification Statement: The patient will continue to require additional inpatient hospital stay due to pending tx acute anemia requiring close monitoring of blood transufsions, pending renal improvement     Discharge Plan: pending clinical course     Code Status: Level 1 - Full Code    Subjective:   Pt seen and examined at bedside  Denies chest pain, shortness of breath, lightheadedness, dizziness  No BM  Agreeable to blood transfusion scale  Notes dec urine output  Objective:     Vitals:   Temp (24hrs), Av 8 °F (36 6 °C), Min:97 5 °F (36 4 °C), Max:97 9 °F (36 6 °C)    Temp:  [97 5 °F (36 4 °C)-97 9 °F (36 6 °C)] 97 8 °F (36 6 °C)  HR:  [59-67] 59  Resp:  [16-18] 16  BP: (144-157)/(50-71) 144/64  SpO2:  [91 %-97 %] 94 %  Body mass index is 34 08 kg/m²  Input and Output Summary (last 24 hours): Intake/Output Summary (Last 24 hours) at 2020 1131  Last data filed at 2020 6988  Gross per 24 hour   Intake 480 ml   Output 700 ml   Net -220 ml       Physical Exam:     Physical Exam   Constitutional: She is oriented to person, place, and time  She appears well-developed and well-nourished  No distress  HENT:   Head: Normocephalic and atraumatic  Eyes: EOM are normal  No scleral icterus  Neck: Normal range of motion  Neck supple  Cardiovascular: Normal rate, regular rhythm and normal heart sounds  No murmur heard  Pulmonary/Chest: Effort normal and breath sounds normal    Abdominal: Soft  Bowel sounds are normal  There is no tenderness  Musculoskeletal: Normal range of motion  She exhibits edema  Neurological: She is alert and oriented to person, place, and time  Skin: Skin is warm and dry  There is pallor  Psychiatric: She has a normal mood and affect       Additional Data:     Labs:    Results from last 7 days   Lab Units 20  0439 20  1516   WBC Thousand/uL 7 63 7 32   HEMOGLOBIN g/dL 5 8* 6 5*   HEMATOCRIT % 19 2* 21 5*   PLATELETS Thousands/uL 151 164   NEUTROS PCT %  --  78*   LYMPHS PCT %  --  13*   MONOS PCT %  --  3*   EOS PCT %  --  4     Results from last 7 days   Lab Units 20  0439  20  0758   SODIUM mmol/L 136   < > 140   POTASSIUM mmol/L 4 8   < > 4 9   CHLORIDE mmol/L 105   < > 112*   CO2 mmol/L 19*   < > 18*   BUN mg/dL 57*   < > 49* CREATININE mg/dL 4 44*   < > 4 19*   ANION GAP mmol/L 12   < > 10   CALCIUM mg/dL 8 2*   < > 8 4   ALBUMIN g/dL  --   --  3 1*   TOTAL BILIRUBIN mg/dL  --   --  0 53   ALK PHOS U/L  --   --  95   ALT U/L  --   --  14   AST U/L  --   --  7   GLUCOSE RANDOM mg/dL 108   < >  --     < > = values in this interval not displayed  Results from last 7 days   Lab Units 01/17/20  1516   INR  1 09     Results from last 7 days   Lab Units 01/18/20  0744 01/17/20 2057 01/17/20  1715   POC GLUCOSE mg/dl 119 101 232*               * I Have Reviewed All Lab Data Listed Above  * Additional Pertinent Lab Tests Reviewed:  [address] Admission Reviewed    Imaging:    Imaging Reports Reviewed Today Include: all  Imaging Personally Reviewed by Myself Includes:  none    Recent Cultures (last 7 days):           Last 24 Hours Medication List:     Current Facility-Administered Medications:  acetaminophen 650 mg Oral Q6H PRN Praful Aden PA-C   amLODIPine 10 mg Oral Daily Praful Aden PA-C   amLODIPine 5 mg Oral QPM Maverick Kaplan MD   ARIPiprazole 30 mg Oral HS Praful Aedn PA-C   aspirin 81 mg Oral Daily Praful Aden PA-C   busPIRone 5 mg Oral BID Elfida Marybel Aden PA-C   cholecalciferol 3,000 Units Oral Daily Praful Aden PA-C   cloNIDine 0 2 mg Oral Q8H [address] Praful Aden PA-C   diphenhydrAMINE 25 mg Intravenous Q6H PRN Praful Aden PA-C   docusate sodium 100 mg Oral BID Praful Aden PA-C   doxazosin 2 mg Oral HS Praful Aden PA-C   DULoxetine 60 mg Oral BID Praful Aden PA-C   ferrous sulfate 325 mg Oral Daily With Breakfast Praful Aden PA-C   folic acid 6,230 mcg Oral Daily Praful Aden PA-C   hydrALAZINE 50 mg Oral TID Praful Aden PA-C   insulin glargine 4 Units Subcutaneous HS Maverick Kaplan MD   insulin lispro 1-5 Units Subcutaneous HS Praful Aden PA-C   insulin lispro 1-5 Units Subcutaneous TID AC Praful Aden PA-C   levothyroxine 125 mcg Oral Daily Praful Aden, JOHN   LORazepam 2 mg Oral TID PRN Lu Argentinanathaniel Aden PA-C   metoprolol tartrate 50 mg Oral Q12H [address] Gomez Enciso MD   polyethylene glycol 17 g Oral Daily Praful Aden PA-C   pravastatin 80 mg Oral Daily With [employer] Aden, JOHN   predniSONE 5 mg Oral Daily Praful Aden, JOHN   rOPINIRole 0 25 mg Oral BID Praful Aden PA-C   senna 1 tablet Oral Daily Praful Aden PA-C   sertraline 200 mg Oral Daily Praful Aden, JOHN   sevelamer 1,600 mg Oral TID With Meals Praful Aden PA-C   sodium bicarbonate 1,300 mg Oral TID Praful Aden, JOHN   tacrolimus 2 mg Oral Q12H [address] Praful Aden PA-C        Today, Patient Was Seen By: Lola Khan PA-C    ** Please Note: Dictation voice to text software may have been used in the creation of this document   **

## 2020-01-18 NOTE — ASSESSMENT & PLAN NOTE
Lab Results   Component Value Date    HGBA1C 4 9 12/28/2019     Lantus was d/c to avoid hypoglycemia  Recommended to continue checking blood sugar at home daily  Schedule follow-up office visit with endocrinologist Dr Christiana Chance  Check eye exam by ophthalmologist annually  Follow- up with podiatry for routine foot care

## 2020-01-18 NOTE — PLAN OF CARE
Problem: Potential for Falls  Goal: Patient will remain free of falls  Description  INTERVENTIONS:  - Assess patient frequently for physical needs  -  Identify cognitive and physical deficits and behaviors that affect risk of falls    -  [place] fall precautions as indicated by assessment   - Educate patient/family on patient safety including physical limitations  - Instruct patient to call for assistance with activity based on assessment  - Modify environment to reduce risk of injury  - Consider OT/PT consult to assist with strengthening/mobility  Outcome: Progressing     Problem: PAIN - ADULT  Goal: Verbalizes/displays adequate comfort level or baseline comfort level  Description  Interventions:  - Encourage patient to monitor pain and request assistance  - Assess pain using appropriate pain scale  - Administer analgesics based on type and severity of pain and evaluate response  - Implement non-pharmacological measures as appropriate and evaluate response  - Consider cultural and social influences on pain and pain management  - Notify physician/advanced practitioner if interventions unsuccessful or patient reports new pain  Outcome: Progressing     Problem: INFECTION - ADULT  Goal: Absence or prevention of progression during hospitalization  Description  INTERVENTIONS:  - Assess and monitor for signs and symptoms of infection  - Monitor lab/diagnostic results  - Monitor all insertion sites, i e  indwelling lines, tubes, and drains  - Monitor endotracheal if appropriate and nasal secretions for changes in amount and color  - [place] appropriate cooling/warming therapies per order  - Administer medications as ordered  - Instruct and encourage patient and family to use good hand hygiene technique  - Identify and instruct in appropriate isolation precautions for identified infection/condition  Outcome: Progressing  Goal: Absence of fever/infection during neutropenic period  Description  INTERVENTIONS:  - Monitor WBC    Outcome: Progressing     Problem: SAFETY ADULT  Goal: Maintain or return to baseline ADL function  Description  INTERVENTIONS:  -  Assess patient's ability to carry out ADLs; assess patient's baseline for ADL function and identify physical deficits which impact ability to perform ADLs (bathing, care of mouth/teeth, toileting, grooming, dressing, etc )  - Assess/evaluate cause of self-care deficits   - Assess range of motion  - Assess patient's mobility; develop plan if impaired  - Assess patient's need for assistive devices and provide as appropriate  - Encourage maximum independence but intervene and supervise when necessary  - Involve family in performance of ADLs  - Assess for home care needs following discharge   - Consider OT consult to assist with ADL evaluation and planning for discharge  - Provide patient education as appropriate  Outcome: Progressing  Goal: Maintain or return mobility status to optimal level  Description  INTERVENTIONS:  - Assess patient's baseline mobility status (ambulation, transfers, stairs, etc )    - Identify cognitive and physical deficits and behaviors that affect mobility  - Identify mobility aids required to assist with transfers and/or ambulation (gait belt, sit-to-stand, lift, walker, cane, etc )  - [place] fall precautions as indicated by assessment  - Record patient progress and toleration of activity level on Mobility SBAR; progress patient to next Phase/Stage  - Instruct patient to call for assistance with activity based on assessment  - Consider rehabilitation consult to assist with strengthening/weightbearing, etc   Outcome: Progressing     Problem: DISCHARGE PLANNING  Goal: Discharge to home or other facility with appropriate resources  Description  INTERVENTIONS:  - Identify barriers to discharge w/patient and caregiver  - Arrange for needed discharge resources and transportation as appropriate  - Identify discharge learning needs (meds, wound care, etc )  - Arrange for interpretive services to assist at discharge as needed  - Refer to Case Management Department for coordinating discharge planning if the patient needs post-hospital services based on physician/advanced practitioner order or complex needs related to functional status, cognitive ability, or social support system  Outcome: Progressing     Problem: Knowledge Deficit  Goal: Patient/family/caregiver demonstrates understanding of disease process, treatment plan, medications, and discharge instructions  Description  Complete learning assessment and assess knowledge base    Interventions:  - Provide teaching at level of understanding  - Provide teaching via preferred learning methods  Outcome: Progressing

## 2020-01-18 NOTE — ASSESSMENT & PLAN NOTE
Wt Readings from Last 3 Encounters:   01/18/20 98 7 kg (217 lb 9 5 oz)   01/17/20 99 3 kg (219 lb)   01/08/20 100 kg (221 lb)   · Echocardiogram most recently with normal ejection fraction, grade 1 diastolic dysfunction with no wall motion abnormalities, moderate aortic regurgitation and trace pericardial effusion  · Last admission patient found to be in acute on chronic diastolic heart failure and was diuresed by Nephrology and received IV albumin and switch to p o   Torsemide  · Diuretics are on hold in the setting of acute renal failure

## 2020-01-18 NOTE — ASSESSMENT & PLAN NOTE
Patient monitors blood pressure at home daily  BP at home this morning was 142//40  Continue current medical regimen

## 2020-01-18 NOTE — ASSESSMENT & PLAN NOTE
Patient is on chronic immunosuppression with Tacrolimus and Prednisone  Follow- up with nephrology Dr Clifford Salvage

## 2020-01-18 NOTE — ASSESSMENT & PLAN NOTE
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

## 2020-01-18 NOTE — PROGRESS NOTES
Progress Note - Nephrology   Maryann Deleon 53 y o  female MRN: [id number]  Unit/Bed#: S -01 Encounter: [id number]    ASSESSMENT AND PLAN:  1  CKD stage 4 status post pancreatic/kidney transplant 1998:  Baseline creatinine is 2 5-3 as noted above  Patient with chronic allograft nephropathy  2  AK I/POA:  As outlined most compatible with prerenal secondary severe anemia possibly mild volume depletion from diuretics  No evidence of hypotension  Workup:  · UA:  2+ proteinuria/innumerable WBCs/obscured RBC field and field obscured bacteria  · Recent UPC:  4 15 g which is rising however, this is the setting of potential UTI  · PVR bladder scans:  Negative  · Check Prograf trough level:  Acceptable at 5 5  · I obtain transplant ultrasound  Treatment:  · Reinitiate diuretics given beginning of peripheral edema especially cousin creatinine has not improved  · Status post transfusion  · Monitor renal function  · Hold spironolactone with borderline high potassium  · Continue sodium bicarbonate for metabolic acidosis  · Continue phosphate binder for hyperphosphatemia     2  Anemia:  Rule out GI bleeding  Certainly may be a component related to multiple myeloma and CKD  Workup:  -check iron studies  -check stool for occult blood  -consider GI evaluation per primary service  -consider Hematology Oncology follow-up if persistent  Treatment:  -transfuse per primary team  -monitor hemoglobin  -check iron studies  -check stool for occult blood  -consider GI evaluation per primary service  -consider Hematology Oncology follow-up if persistent  Treatment:  -transfuse per primary team  -monitor hemoglobin    3  The patient is being treated for UTI per Infectious Disease awaiting culture    4  History of multiple myeloma:  Had been on Revlimid  On hold for recent illnesses  Will need hematology oncology follow-up  5  Hypertension:  Slightly elevated    I would decrease the dose of amlodipine as usually the maximal doses 10 mg above that dose can lead to worsening edema and not much effectiveness  May need to increase clonidine  I will also add torsemide back to her regimen  Subjective:   Patient is asymptomatic  No dysuria hematuria, no chest pain or shortness of breath  No nausea vomiting or diarrhea  Does feel like she is developing some mild peripheral edema which she had recently  Objective:     Vitals: Blood pressure (!) 182/74, pulse 62, temperature 97 5 °F (36 4 °C), temperature source (P) Oral, resp  rate 18, height 5' 7" (1 702 m), weight 98 7 kg (217 lb 9 5 oz), SpO2 95 %  ,Body mass index is 34 08 kg/m²      Weight (last 2 days)     Date/Time   Weight    01/18/20 0600   98 7 (217 59)    01/17/20 1606   98 9 (218)    01/17/20 1413   99 2 (218 7)                Intake/Output Summary (Last 24 hours) at 1/18/2020 1550  Last data filed at 1/18/2020 1545  Gross per 24 hour   Intake 830 ml   Output 1200 ml   Net -370 ml            Physical Exam: General:  No acute distress sitting out of bed  Skin:  No acute rash  Eyes:  No scleral icterus and noninjected  ENT:  Moist mucous membranes  Neck:  Supple, no jugular venous distention, trachea midline, overall appearance is normal  Chest:  Clear to auscultation  CVS:  Regular rate and rhythm, without a rub or gallops  Abdomen:  Normal bowel sounds, soft and nontender and nondistended in particular, no tenderness over the left lower quadrant transplant site  Extremities:  Very minimal peripheral lower extremity and upper extremity edema, and no cyanosis, no significant arthritic changes  Neuro:  No gross focality  Psych:  Alert and oriented and appropriate                Medications:    Scheduled Meds:  Current Facility-Administered Medications:  acetaminophen 650 mg Oral Q6H PRN Praful Aden PA-C    amLODIPine 10 mg Oral Daily Praful Aden PA-C    amLODIPine 5 mg Oral QPM Apurva Hyman MD    ARIPiprazole 30 mg Oral HS Praful Aden PA-C    aspirin 81 mg Oral Daily Praful Aden PA-C    busPIRone 5 mg Oral BID Avril Aden PA-C    cholecalciferol 3,000 Units Oral Daily Praful Aden PA-C    cloNIDine 0 2 mg Oral Q8H [address] Praful Aden PA-C    DAPTOmycin 6 mg/kg (Adjusted) Intravenous Q48H Sade Guerrero MD Last Rate: 450 mg (01/18/20 1459)   diphenhydrAMINE 25 mg Intravenous Q6H PRN Praful Aden PA-C    docusate sodium 100 mg Oral BID Praful Aden PA-C    doxazosin 2 mg Oral HS Praful Aden PA-C    DULoxetine 60 mg Oral BID Praful Aden PA-C    ferrous sulfate 325 mg Oral Daily With Breakfast Praful Aden PA-C    folic acid 6,713 mcg Oral Daily Praful Aden PA-C    hydrALAZINE 50 mg Oral TID Praful Aden PA-C    insulin glargine 4 Units Subcutaneous HS Valeriano Dobbins MD    insulin lispro 1-5 Units Subcutaneous HS Praful Aden PA-C    insulin lispro 1-5 Units Subcutaneous TID AC Praful Aden PA-C    levothyroxine 125 mcg Oral Daily Praful Aden PA-C    LORazepam 2 mg Oral TID PRN Avril Aden PA-C    metoprolol tartrate 50 mg Oral Q12H [address] Boni Leigh MD    polyethylene glycol 17 g Oral Daily Praful Aden PA-C    pravastatin 80 mg Oral Daily With [employer] Markie PA-C    predniSONE 5 mg Oral Daily Praful Aden PA-C    rOPINIRole 0 25 mg Oral BID Praful Aden PA-C    senna 1 tablet Oral Daily Praful Aden PA-C    sertraline 200 mg Oral Daily Praful Aden PA-C    sevelamer 1,600 mg Oral TID With Meals Praful Aden PA-C    sodium bicarbonate 1,300 mg Oral TID Praful Aden PA-C    tacrolimus 2 mg Oral Q12H [address] Praful Aden PA-C        PRN Meds:   acetaminophen    diphenhydrAMINE    LORazepam    Continuous Infusions:     Lab, Imaging and other studies: I have personally reviewed pertinent labs    Laboratory Results:  Results from last 7 days   Lab Units 01/18/20  0439 01/17/20  1516 01/17/20  0758   WBC Thousand/uL 7 63 7 32 8 00   HEMOGLOBIN g/dL 5 8* 6 5* 6 5*   HEMATOCRIT % 19 2* 21 5* 21 1*   PLATELETS Thousands/uL 151 164 163   POTASSIUM mmol/L 4 8 5 1 4 9   CHLORIDE mmol/L 105 104 112*   CO2 mmol/L 19* 20* 18*   BUN mg/dL 57* 56* 49*   CREATININE mg/dL 4 44* 4 42* 4 19*   CALCIUM mg/dL 8 2* 8 7 8 4   MAGNESIUM mg/dL 2 7*  --  2 9*   PHOSPHORUS mg/dL 5 3*  --  4 6*     Urinalysis: Lab Results   Component Value Date    COLORU Yellow 01/17/2020    COLORU Yellow 07/29/2016    CLARITYU Turbid 01/17/2020    CLARITYU Cloudy 07/29/2016    SPECGRAV 1 015 01/17/2020    SPECGRAV 1 010 07/29/2016    PHUR 8 0 01/17/2020    PHUR 7 5 11/05/2019    PHUR 7 5 07/29/2016    LEUKOCYTESUR Large (A) 01/17/2020    LEUKOCYTESUR 3+ 07/29/2016    NITRITE Negative 01/17/2020    NITRITE NEG 07/29/2016    PROTEINUA 1+ 07/29/2016    GLUCOSEU Negative 01/17/2020    GLUCOSEU Negative 07/20/2015    KETONESU Negative 01/17/2020    KETONESU NEG 07/29/2016    BILIRUBINUR Negative 01/17/2020    BILIRUBINUR NEG 07/29/2016    BLOODU Trace-Intact (A) 01/17/2020    BLOODU NEG 07/29/2016     ABGs: No results found for: [facility]  Radiology review:     Portions of the record may have been created with voice recognition software  Occasional wrong word or "sound a like" substitutions may have occurred due to the inherent limitations of voice recognition software  Read the chart carefully and recognize, using context, where substitutions have occurred

## 2020-01-19 ENCOUNTER — APPOINTMENT (INPATIENT)
Dept: ULTRASOUND IMAGING | Facility: HOSPITAL | Age: 55
DRG: 683 | End: 2020-01-19
Payer: MEDICARE

## 2020-01-19 LAB
ABO GROUP BLD BPU: NORMAL
ABO GROUP BLD BPU: NORMAL
ANION GAP SERPL CALCULATED.3IONS-SCNC: 13 MMOL/L (ref 4–13)
BPU ID: NORMAL
BPU ID: NORMAL
BUN SERPL-MCNC: 62 MG/DL (ref 5–25)
CALCIUM SERPL-MCNC: 8.3 MG/DL (ref 8.3–10.1)
CHLORIDE SERPL-SCNC: 106 MMOL/L (ref 100–108)
CO2 SERPL-SCNC: 16 MMOL/L (ref 21–32)
CREAT SERPL-MCNC: 4.48 MG/DL (ref 0.6–1.3)
CROSSMATCH: NORMAL
CROSSMATCH: NORMAL
ERYTHROCYTE [DISTWIDTH] IN BLOOD BY AUTOMATED COUNT: 17.3 % (ref 11.6–15.1)
GFR SERPL CREATININE-BSD FRML MDRD: 10 ML/MIN/1.73SQ M
GLUCOSE SERPL-MCNC: 104 MG/DL (ref 65–140)
GLUCOSE SERPL-MCNC: 107 MG/DL (ref 65–140)
GLUCOSE SERPL-MCNC: 110 MG/DL (ref 65–140)
GLUCOSE SERPL-MCNC: 119 MG/DL (ref 65–140)
GLUCOSE SERPL-MCNC: 122 MG/DL (ref 65–140)
HCT VFR BLD AUTO: 26.2 % (ref 34.8–46.1)
HGB BLD-MCNC: 8.3 G/DL (ref 11.5–15.4)
MCH RBC QN AUTO: 32.2 PG (ref 26.8–34.3)
MCHC RBC AUTO-ENTMCNC: 31.7 G/DL (ref 31.4–37.4)
MCV RBC AUTO: 102 FL (ref 82–98)
PLATELET # BLD AUTO: 154 THOUSANDS/UL (ref 149–390)
PMV BLD AUTO: 12.9 FL (ref 8.9–12.7)
POTASSIUM SERPL-SCNC: 4.7 MMOL/L (ref 3.5–5.3)
RBC # BLD AUTO: 2.58 MILLION/UL (ref 3.81–5.12)
SODIUM SERPL-SCNC: 135 MMOL/L (ref 136–145)
TACROLIMUS BLD-MCNC: 5.4 NG/ML (ref 2–20)
UNIT DISPENSE STATUS: NORMAL
UNIT DISPENSE STATUS: NORMAL
UNIT PRODUCT CODE: NORMAL
UNIT PRODUCT CODE: NORMAL
UNIT RH: NORMAL
UNIT RH: NORMAL
WBC # BLD AUTO: 8.55 THOUSAND/UL (ref 4.31–10.16)

## 2020-01-19 PROCEDURE — 85027 COMPLETE CBC AUTOMATED: CPT | Performed by: PHYSICIAN ASSISTANT

## 2020-01-19 PROCEDURE — 99233 SBSQ HOSP IP/OBS HIGH 50: CPT | Performed by: INTERNAL MEDICINE

## 2020-01-19 PROCEDURE — 97116 GAIT TRAINING THERAPY: CPT

## 2020-01-19 PROCEDURE — 99232 SBSQ HOSP IP/OBS MODERATE 35: CPT | Performed by: PHYSICIAN ASSISTANT

## 2020-01-19 PROCEDURE — 82948 REAGENT STRIP/BLOOD GLUCOSE: CPT

## 2020-01-19 PROCEDURE — 99232 SBSQ HOSP IP/OBS MODERATE 35: CPT | Performed by: INTERNAL MEDICINE

## 2020-01-19 PROCEDURE — 80048 BASIC METABOLIC PNL TOTAL CA: CPT | Performed by: PHYSICIAN ASSISTANT

## 2020-01-19 PROCEDURE — 97163 PT EVAL HIGH COMPLEX 45 MIN: CPT

## 2020-01-19 RX ORDER — HYDRALAZINE HYDROCHLORIDE 25 MG/1
75 TABLET, FILM COATED ORAL 3 TIMES DAILY
Status: DISCONTINUED | OUTPATIENT
Start: 2020-01-19 | End: 2020-01-22

## 2020-01-19 RX ADMIN — METOPROLOL TARTRATE 50 MG: 50 TABLET, FILM COATED ORAL at 08:10

## 2020-01-19 RX ADMIN — FERROUS SULFATE TAB 325 MG (65 MG ELEMENTAL FE) 325 MG: 325 (65 FE) TAB at 08:11

## 2020-01-19 RX ADMIN — PREDNISONE 5 MG: 5 TABLET ORAL at 08:11

## 2020-01-19 RX ADMIN — HYDRALAZINE HYDROCHLORIDE 75 MG: 25 TABLET ORAL at 21:51

## 2020-01-19 RX ADMIN — BUSPIRONE HYDROCHLORIDE 5 MG: 5 TABLET ORAL at 08:11

## 2020-01-19 RX ADMIN — CLONIDINE HYDROCHLORIDE 0.2 MG: 0.1 TABLET ORAL at 21:50

## 2020-01-19 RX ADMIN — SODIUM BICARBONATE 650 MG TABLET 1300 MG: at 16:24

## 2020-01-19 RX ADMIN — ROPINIROLE HYDROCHLORIDE 0.25 MG: 0.25 TABLET, FILM COATED ORAL at 17:39

## 2020-01-19 RX ADMIN — INSULIN GLARGINE 4 UNITS: 100 INJECTION, SOLUTION SUBCUTANEOUS at 21:53

## 2020-01-19 RX ADMIN — HYDRALAZINE HYDROCHLORIDE 50 MG: 25 TABLET ORAL at 08:10

## 2020-01-19 RX ADMIN — MELATONIN 3000 UNITS: at 08:11

## 2020-01-19 RX ADMIN — DULOXETINE HYDROCHLORIDE 60 MG: 60 CAPSULE, DELAYED RELEASE ORAL at 17:39

## 2020-01-19 RX ADMIN — ROPINIROLE HYDROCHLORIDE 0.25 MG: 0.25 TABLET, FILM COATED ORAL at 08:11

## 2020-01-19 RX ADMIN — DOCUSATE SODIUM 100 MG: 100 CAPSULE, LIQUID FILLED ORAL at 08:10

## 2020-01-19 RX ADMIN — SEVELAMER HYDROCHLORIDE 1600 MG: 800 TABLET, FILM COATED PARENTERAL at 16:26

## 2020-01-19 RX ADMIN — AMLODIPINE BESYLATE 10 MG: 10 TABLET ORAL at 08:11

## 2020-01-19 RX ADMIN — FOLIC ACID 1000 MCG: 1 TABLET ORAL at 08:11

## 2020-01-19 RX ADMIN — SEVELAMER HYDROCHLORIDE 1600 MG: 800 TABLET, FILM COATED PARENTERAL at 12:01

## 2020-01-19 RX ADMIN — BUSPIRONE HYDROCHLORIDE 5 MG: 5 TABLET ORAL at 17:39

## 2020-01-19 RX ADMIN — SODIUM BICARBONATE 650 MG TABLET 1300 MG: at 08:10

## 2020-01-19 RX ADMIN — CLONIDINE HYDROCHLORIDE 0.2 MG: 0.1 TABLET ORAL at 14:19

## 2020-01-19 RX ADMIN — DOXAZOSIN 2 MG: 1 TABLET ORAL at 21:52

## 2020-01-19 RX ADMIN — DOCUSATE SODIUM 100 MG: 100 CAPSULE, LIQUID FILLED ORAL at 17:39

## 2020-01-19 RX ADMIN — LEVOTHYROXINE SODIUM 125 MCG: 125 TABLET ORAL at 05:12

## 2020-01-19 RX ADMIN — SEVELAMER HYDROCHLORIDE 1600 MG: 800 TABLET, FILM COATED PARENTERAL at 08:10

## 2020-01-19 RX ADMIN — PRAVASTATIN SODIUM 80 MG: 80 TABLET ORAL at 16:24

## 2020-01-19 RX ADMIN — TACROLIMUS 2 MG: 1 CAPSULE ORAL at 08:11

## 2020-01-19 RX ADMIN — TACROLIMUS 2 MG: 1 CAPSULE ORAL at 21:50

## 2020-01-19 RX ADMIN — SENNOSIDES 8.6 MG: 8.6 TABLET, FILM COATED ORAL at 08:11

## 2020-01-19 RX ADMIN — METOPROLOL TARTRATE 50 MG: 50 TABLET, FILM COATED ORAL at 21:52

## 2020-01-19 RX ADMIN — HYDRALAZINE HYDROCHLORIDE 75 MG: 25 TABLET ORAL at 16:24

## 2020-01-19 RX ADMIN — SERTRALINE HYDROCHLORIDE 200 MG: 100 TABLET ORAL at 08:11

## 2020-01-19 RX ADMIN — DULOXETINE HYDROCHLORIDE 60 MG: 60 CAPSULE, DELAYED RELEASE ORAL at 08:11

## 2020-01-19 RX ADMIN — SODIUM BICARBONATE 650 MG TABLET 1300 MG: at 21:52

## 2020-01-19 RX ADMIN — ASPIRIN 81 MG 81 MG: 81 TABLET ORAL at 08:11

## 2020-01-19 RX ADMIN — ARIPIPRAZOLE 30 MG: 10 TABLET ORAL at 21:53

## 2020-01-19 RX ADMIN — TORSEMIDE 20 MG: 20 TABLET ORAL at 08:10

## 2020-01-19 RX ADMIN — CLONIDINE HYDROCHLORIDE 0.2 MG: 0.1 TABLET ORAL at 05:12

## 2020-01-19 NOTE — PROGRESS NOTES
Progress Note - Infectious Disease   Jaye Stark 53 y o  female MRN: [id number]  Unit/Bed#: S -01 Encounter: [id number]      Impression/Recommendations:  1  Symptomatic UTI  Patient has history of bladder colonization with MDR pathogens, most recently VRE  We have tried to keep patient off antibiotic as much as possible, and not treat her asymptomatic bacteriuria  Unfortunately, patient now has symptoms of UTI  Her UA is grossly abnormal and she will most likely have growth in urine culture  Abnormal UA and bacteriuria in themselves are not diagnostic of the UTI and do not necessarily need treatment  However, given dysuria, it is difficult not to treat patient  Given VRE in the most recent urine culture, daptomycin was started yesterday  Dysuria is much improved today  Clinically, she has no evidence of pyelonephritis of transplanted kidney  Hopefully, we can keep antibiotic course brief  Continue daptomycin  Follow-up on urine culture  Monitor urinary symptoms  Monitor temperature/WBC  Hopefully treat x7 days total      2  EDUARDO, superimposed on CKD  Etiology not entirely clear  Possible prerenal state from diuresis and decreased p o  fluid intake  Creatinine has not improved  Antibiotic dosages adjusted accordingly  Monitor creatinine      3  Status post renal transplant  No evidence of pyelonephritis of transplanted kidney      4  Multiple myeloma  Patient had been on Revlimid previously but on hold due to multiple admissions for acute illnesses recently      Discussed with patient in detail regarding the above plan  Antibiotics:  Daptomycin # 2     Subjective:  Patient has dysuria is improved today  No abdominal or flank pain  Temperature stays down  No chills  She is tolerating antibiotic well  No nausea, vomiting or diarrhea      Objective:  Vitals:  Temp:  [97 5 °F (36 4 °C)-98 1 °F (36 7 °C)] 97 5 °F (36 4 °C)  HR:  [57-62] 60  Resp:  [16-18] 18  BP: (144-182)/(64-74) 163/72  SpO2:  [92 %-96 %] 95 %  Temp (24hrs), Av 8 °F (36 6 °C), Min:97 5 °F (36 4 °C), Max:98 1 °F (36 7 °C)  Current: Temperature: 97 5 °F (36 4 °C)    Physical Exam:     General: Awake, alert, cooperative, no distress  Neck:  Supple  No mass  No lymphadenopathy  Lungs: Expansion symmetric, no rales, no wheezing, respirations unlabored  Heart:  Regular rate and rhythm, S1 and S2 normal, no murmur  Abdomen: Soft, nondistended, non-tender, bowel sounds active all four quadrants,        no masses, no organomegaly  Extremities: Trace edema  No erythema/warmth  No ulcer  Nontender to palpation  Skin:  No rash  Neuro: Moves all extremities  Invasive Devices     Peripheral Intravenous Line            Peripheral IV 20 Left Forearm 1 day                Labs studies:   I have personally reviewed pertinent labs  Results from last 7 days   Lab Units 20  0514 20  0439 20  1516 20  0758   POTASSIUM mmol/L 4 7 4 8 5 1 4 9   CHLORIDE mmol/L 106 105 104 112*   CO2 mmol/L 16* 19* 20* 18*   BUN mg/dL 62* 57* 56* 49*   CREATININE mg/dL 4 48* 4 44* 4 42* 4 19*   EGFR ml/min/1 73sq m 10 10 11 11   CALCIUM mg/dL 8 3 8 2* 8 7 8 4   AST U/L  --   --   --  7   ALT U/L  --   --   --  14   ALK PHOS U/L  --   --   --  95     Results from last 7 days   Lab Units 20  05204 20  0439 20  1516   WBC Thousand/uL 8 55  --  7 63 7 32   HEMOGLOBIN g/dL 8 3* 8 1* 5 8* 6 5*   PLATELETS Thousands/uL 154  --  151 164           Imaging Studies:   I have personally reviewed pertinent imaging study reports and images in PACS  EKG, Pathology, and Other Studies:   I have personally reviewed pertinent reports

## 2020-01-19 NOTE — PHYSICAL THERAPY NOTE
PHYSICAL THERAPY EVALUATION NOTE    Patient Name: Prateek NAYAK Date: 1/19/2020  Time in: 1353  Time out: 1413  Total eval time: 20 minutes    AGE:   53 y o  Mrn:   [id number]  ADMIT DX:  Abnormal laboratory test result [R89 9]  EDUARDO (acute kidney injury) ([facility] [address] ) [N17 9]  Anemia due to stage 3 chronic kidney disease (HCC) [N18 3, D63 1]    Past Medical History:   Diagnosis Date    Abnormal liver function test     Acute kidney injury ([facility] [address] )     Acute on chronic congestive heart failure (HCC)     Allergic urticaria     Anemia     Cancer (HCC)     Multiple myeloma    Cervical dysplasia     Cholelithiasis     Chronic diastolic (congestive) heart failure ([facility] [address] ) 9/18/2017    Diabetes mellitus (HCC)     Previous, controlled with diet    Diabetes mellitus with foot ulcer ([facility] [address] )     Disease of thyroid gland     Encephalopathy     Hematuria     + leak est- secondary to UTIs/panc drainage    History of transfusion     Hyperkalemia     Hypertension     Iliotibial band syndrome     Lumbar radiculopathy     Multiple myeloma (HCC)     Multiple myeloma ([facility] [address] )     Night blindness     Nonrheumatic aortic (valve) insufficiency     Pneumonia     Renal disorder     Retinopathy     Seborrhea     Seizure ([facility] [address] )     Shingles     Sinus tachycardia     B blocker - cardio echo stress test 02 normal/neg LE doppler 2/02 OK and 12/07    Status post simultaneous kidney and pancreas transplant ([facility] [address] )     Toe amputation status     Trochanteric bursitis      Length Of Stay: 2  PHYSICAL THERAPY EVALUATION :      01/19/20 1421   Note Type   Note type Eval/Treat   Pain Assessment   Pain Assessment No/denies pain   Pain Score No Pain   Home Living   Type of Home Apartment   Home Layout One level;Stairs to enter with rails  (3 JESSI, B rails)   Bathroom Shower/Tub Tub/shower unit   Bathroom Toilet Standard   Bathroom Equipment Grab bars in shower; Shower chair [address] Walker;Cane;Grab bars; Other (Comment)  (Shower chair)   Additional Comments Pt lives in a 1 floor apartment w/ 3 JESSI w/ b rails  She primarily uses a cane to get around both in and outside of the home  She states she has a RW but does not use it  She states that her apartment is too narrow for her to use the RW in the home  Prior Function   Level of [place] Independent with ADLs and functional mobility   Lives With Alone   Receives Help From Family  (Dad)   ADL Assistance Independent   IADLs Independent   Falls in the last 6 months 1 to 4  (2)   Comments Pt lives alone, her father helps her with things she needs, but she is able to be independent when needed  Restrictions/Precautions   Weight Bearing Precautions Per Order No   Other Precautions Fall Risk   General   Additional Pertinent History Pt has all toes on L foot amputated, great toe on R foot is amputated   Family/Caregiver Present No   Cognition   Overall Cognitive Status WFL   Arousal/Participation Cooperative   Orientation Level Oriented X4  (Pt identified by full name and )   Memory Within functional limits   Following Commands Follows all commands and directions without difficulty   Comments Pt OOB in recliner chair at start of session  She is pleasant and agreeable to PT intervention      RUE Assessment   RUE Assessment WFL   LUE Assessment   LUE Assessment WFL   RLE Assessment   RLE Assessment WFL   Strength RLE   RLE Overall Strength 4-/5   LLE Assessment   LLE Assessment WFL   Strength LLE   LLE Overall Strength 4-/5   Coordination   Sensation WFL   Light Touch   RLE Light Touch Grossly intact   LLE Light Touch Grossly intact   Bed Mobility   Supine to Sit Unable to assess   Sit to Supine Unable to assess   Additional Comments Pt OOB in at both start and end of session   Transfers   Sit to Stand 6  Modified independent   Stand to Sit 6  Modified independent   Other   (4 Item mDGI = 8/12 (<10/12 is at increased risk of falls))   Additional Comments Pt states she has special shoes to accomodate for feet amputations  Declines putting shoes on to ambulate, states w/ the increased edema in her lower legs she doesn't think they would fit anyway   Ambulation/Elevation   Gait pattern Wide SLIM; Decreased foot clearance  (L foot in ER)   Gait Assistance 5  Supervision   Additional items Assist x 1   Assistive Device [facility]   Distance 320 ft  (standing rest break x 3 after ~280 ft of amb)   Stair Management Assistance 4  Minimal assist   Additional items Assist x 1;Verbal cues; Increased time required   Stair Management Technique Two rails; Step to pattern   Number of Stairs 3   Balance   Static Sitting Good   Static Standing Fair +   Ambulatory Fair -  (w/ SPC)   Endurance Deficit   Endurance Deficit Yes   Endurance Deficit Description Pt c/o SOB after ambulation  VRI = 2/4 after ambulation  Activity Tolerance   Activity Tolerance Patient limited by fatigue   Nurse Made Aware RN cleared pt for PT intervention   Assessment   Prognosis Good   Problem List Decreased strength;Decreased endurance; Impaired balance;Decreased mobility;Obesity  (Gait deviations)   Assessment Harrington Stain Judith Fraser is a 54 y/o Female who [facility] 1[facility]17[facility] at the recommendation of her nephrologist due to anemic hemoglobin and increasing creatinine   Dx of anemia, EDUARDO superimposed on CKD stage 4  Order placed for PT eval and tx, w/ activity order of up w/ A and fall precautions  Pt presents w/ comorbidities of CHF, chronic constipation, HTN, CKD stage 4 (kidney transplant in 1998), DMI (controlled), multiple myeloma stage III, Afib, acquired hypothyroidism, amputation of all digits on L foot and great toe of R  and personal factors of mobilizing w/ assistive device, stair(s) to enter home, limited home support and positive fall history  Pt presents w/ weakness, decreased endurance, impaired balance, gait deviations and LE edema  These impairments are evident in findings from physical examination (weakness and edema of extremities), mobility assessment (need for min A to mod (I) assist w/ all phases of mobility when usually mobilizing independently, need for cueing for mobility technique and need for cueing for safety on stairs), and Barthel Index: 85/100 and 4 item mDGI = 8/12 (<10/12 is at increased risk of falls)  Pt needed input for mobility technique  Pt is at risk for falls due to physical deficits  Pt's clinical presentation is unstable/unpredictable (evident in need for assist w/ all phases of mobility when usually mobilizing independently and need for input for mobility technique, hx of falls)  Pt needs inpatient PT tx to improve mobility deficits  Discharge recommendation is for outpatient PT in order to reduce fall risk and maximize level of functional independence  Goals   Patient Goals "improve my balance"   [facility] 01/29/20   Short Term Goal #1 Patient will: Ambulate at least 400 ft  with least restrictive assistive device independently w/o LOB and without rest breaks, Navigate 3 stairs independently with bilateral handrails to facilitate return to previous living environment, Increase all balance 1/2 grade to decrease risk for falls, Complete exercise program independently and Improve Barthel Index score to 100 or greater to facilitate independence, & improve 4 item m-DGI by 2 points to at least 10/12 to decrease risk of falls and demonstrate improvements in dynamic balance  PT Treatment Day 0   Plan   Treatment/Interventions Functional transfer training;LE strengthening/ROM; Elevations; Therapeutic exercise; Endurance training;Patient/family training;Equipment eval/education; Bed mobility;Gait training   PT Frequency 1-2x/wk   Recommendation   Recommendation Outpatient PT  (Continue w/ OPPT for balance)   Equipment Recommended Cane  (trial walker next session)   PT - OK to Discharge Yes  (When medically cleared) Barthel Index   Feeding 10   Bathing 0   Grooming Score 5   Dressing Score 10   Bladder Score 10   Bowels Score 10   Toilet Use Score 10   Transfers (Bed/Chair) Score 15   Mobility (Level Surface) Score 10   Stairs Score 5   Barthel Index Score 85     4 Item Dynamic Gait Index  2/3 Gait level surface  2/3 Change in gait speed  2/3 Gait with horizontal head turns  2/3 Gait with vertical head turns  8/12 total score (less that 10/12 indicates increased risk of fall)                                                                   PHYSICAL THERAPY TREATMENT NOTE    Patient Name: Domitila Kennedy  LNSEU'T Date: 1/19/2020  Time in: 1413  Time out: 1421  Total time: 8 minutes    S: Pt is sitting EOB, she is pleasant and agreeable to PT intervention  O: PT brought in rolling walker  Pt states she has a RW at home but does not use due to home being too narrow  Pt is able to ambulate 25ft w/ mod I w/ RW back to recliner chair  Pt is mod I for stand to sit transfer, places hands on armrests before sitting back  Pt continues to have same gait deviations as with cane  A: Pt introduced RW to pt for this admission  Pt demonstrates good mobility technique w/ rolling walker, requires less assistance w/ RW than with cane  PT spent time educating pt on using RW in the community  Pt verbalizes concerns w/ falling especially when out of the house  PT recommended that pt keep RW in her car (pt states is currently sitting in a closet and never used because her home is too narrow for the walker) and to use when feeling unsteady, tired, or for uneven surfaced  Pt verbalizes understanding  P: Same as above  Skilled inpatient PT: 1-2x/wk (for balance, endurance), recommend cane, RW for long distances, discharge recommendation is home and continue w/ OPPT      Skilled PT is still recommended in order to progress patient toward goals     Jayashree Smalls, PT, DPT

## 2020-01-19 NOTE — ASSESSMENT & PLAN NOTE
· Patient presents with acute renal failure superimposed on chronic kidney disease, creatinine 4 42 with baseline most recently 2 5-3 0    · Creatinine now 4 48  · Possibly pre renal due to recent diuresis, frequent diarrhea at home on PO diuretics, and severe anemia  · 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 and follows Dr Lieutenant Coyle   She is status post kidney and pancreas transplantation 1998 on prednisone 5 mg daily, tacrolimus 2 mg b i d   · Presented to hospital at the request of her outpatient nephrologist due to worsening creatinine and anemia  · Avoid hypotension, nephrotoxins  · Per Nephrology recommendation, restarted torsemide 20mg qd 1/18  · Continue to hold Aldactone  · Continue sodium bicarb for met acidosis and phosphate binder for hyperphos   · Hold IVF for now

## 2020-01-19 NOTE — PROGRESS NOTES
Progress Note - Nephrology   Crispin Watson 53 y o  female MRN: [id number]  Unit/Bed#: S -01 Encounter: [id number]    ASSESSMENT AND PLAN:  1  CKD stage 4 status post pancreatic/kidney transplant 1998:  Baseline creatinine is 2 5-3 as noted above   Patient with chronic allograft nephropathy  2  AK I/POA:  As outlined most compatible with prerenal secondary severe anemia possibly mild volume depletion from diuretics   No evidence of hypotension  Workup:  · UA:  2+ proteinuria/innumerable WBCs/obscured RBC field and field obscured bacteria  · Recent UPC:  4 15 g which is rising however, this is the setting of potential UTI  · PVR bladder scans:  Negative  · Check Prograf trough level:  Acceptable at 5 5  · I obtain transplant ultrasound:  PENDING  · CURRENT CREATININE 4 48 WHICH APPEARS TO BE POTENTIAL NEW BASELINE  Treatment:  · Reinitiated diuretics given beginning of peripheral edema especially cousin creatinine has not improved  · Status post transfusion  · Monitor renal function  · Hold spironolactone with borderline high potassium  · Continue sodium bicarbonate for metabolic acidosis  · Continue phosphate binder for hyperphosphatemia     2  Anemia:  Rule out GI bleeding   Certainly may be a component related to multiple myeloma and CKD  Workup:  -current hemoglobin stable at 8 3  -check iron studies:  Acceptable  -check stool for occult blood:  Pending  -consider GI evaluation per primary service  -consider Hematology Oncology follow-up if persistent  Treatment:  -transfuse per primary team  -monitor hemoglobin  -check stool for occult blood  -consider GI evaluation per primary service  -consider Hematology Oncology follow-up if persistent     3  The patient is being treated for UTI per Infectious Disease awaiting culture     4  History of multiple myeloma:  Had been on Revlimid  On hold for recent illnesses  Will need hematology oncology follow-up      5  Hypertension:  Slightly elevated    I would decrease the dose of amlodipine as usually the maximal doses 10 mg above that dose can lead to worsening edema and not much effectiveness  I WOULD INCREASE hydralazine to 75 mg 3 times a day and monitor blood pressure  Subjective:   Patient overall feels great  Only complaint is some mild edema of her legs and little bit of puffiness of her eyes  She denies any dysuria hematuria  She denies any nausea vomiting or diarrhea  No chest pain or shortness of breath  Objective:     Vitals: Blood pressure (!) 173/73, pulse 61, temperature 97 9 °F (36 6 °C), temperature source Oral, resp  rate 18, height 5' 7" (1 702 m), weight 100 kg (220 lb 9 6 oz), SpO2 94 %  ,Body mass index is 34 55 kg/m²      Weight (last 2 days)     Date/Time   Weight    01/19/20 0911   100 (220 6)    01/19/20 0600   100 (220 6)    01/18/20 0600   98 7 (217 59)    01/17/20 1606   98 9 (218)    01/17/20 1413   99 2 (218 7)                Intake/Output Summary (Last 24 hours) at 1/19/2020 1549  Last data filed at 1/19/2020 1050  Gross per 24 hour   Intake 360 ml   Output 750 ml   Net -390 ml            Physical Exam: General:  Obese, No acute distress  Skin:  No acute rash  Eyes:  No scleral icterus and noninjected  ENT:  Moist mucous membranes  Neck:  Supple, no jugular venous distention, trachea midline, overall appearance is normal  Chest:  Clear to auscultation  CVS:  Regular rate and rhythm, without a rub or gallops  Abdomen:  Normal bowel sounds, soft and nontender and nondistended; in particular no tenderness over the left lower quadrant transplant site  Extremities:  Perhaps trace lower extremity edema, and no cyanosis, no significant arthritic changes  Neuro:  No gross focality  Psych:  Alert and oriented and appropriate                Medications:    Scheduled Meds:  Current Facility-Administered Medications:  acetaminophen 650 mg Oral Q6H PRN Praful Aden PA-C    amLODIPine 10 mg Oral Daily Praful Aden PA-C    ARIPiprazole 30 mg Oral HS Praful Aden PA-C    aspirin 81 mg Oral Daily Praful Aden PA-C    busPIRone 5 mg Oral BID Cindy Medal JOHN Aden    cholecalciferol 3,000 Units Oral Daily Praful Aden PA-C    cloNIDine 0 2 mg Oral Q8H [facility] Praful Aden PA-C    DAPTOmycin 6 mg/kg (Adjusted) Intravenous Q48H Neela Gaona MD Last Rate: 450 mg (01/18/20 1959)   diphenhydrAMINE 25 mg Intravenous Q6H PRN Praful Aden PA-C    docusate sodium 100 mg Oral BID Praful Aden PA-C    doxazosin 2 mg Oral HS Praful Aden PA-C    DULoxetine 60 mg Oral BID Praful Aden PA-C    ferrous sulfate 325 mg Oral Daily With Breakfast Praful Aden PA-C    folic acid 1,258 mcg Oral Daily Praful Aden PA-C    hydrALAZINE 50 mg Oral TID Praful Aden PA-C    insulin glargine 4 Units Subcutaneous HS Kraig Rhodes MD    insulin lispro 1-5 Units Subcutaneous HS Praful Aden PA-C    insulin lispro 1-5 Units Subcutaneous TID AC Praful Aden PA-C    levothyroxine 125 mcg Oral Daily Praful Aden PA-C    LORazepam 2 mg Oral TID PRN Cindy Aden PA-C    metoprolol tartrate 50 mg Oral Q12H [facility] Karen Euceda MD    polyethylene glycol 17 g Oral Daily Praful Aden PA-C    pravastatin 80 mg Oral Daily With [facility] [employer] Markie PA-C    predniSONE 5 mg Oral Daily Praful Aden PA-C    rOPINIRole 0 25 mg Oral BID Praful Aden PA-C    senna 1 tablet Oral Daily Praful Aden PA-C    sertraline 200 mg Oral Daily Praful Aden PA-C    sevelamer 1,600 mg Oral TID With Meals Praful Aden PA-C    sodium bicarbonate 1,300 mg Oral TID Praful Aden PA-C    tacrolimus 2 mg Oral Q12H [facility] Praful Aden PA-C    torsemide 20 mg Oral Daily Karen Euceda MD        PRN Meds:   acetaminophen    diphenhydrAMINE    LORazepam    Continuous Infusions:     Lab, Imaging and other studies: I have personally reviewed pertinent labs    Laboratory Results:  Results from last 7 days   Lab Units 01/19/20  0514 01/18/20 2054 01/18/20  5296 01/17/20  1516 01/17/20  0758   WBC Thousand/uL 8 55  --  7 63 7 32 8 00   HEMOGLOBIN g/dL 8 3* 8 1* 5 8* 6 5* 6 5*   HEMATOCRIT % 26 2* 25 5* 19 2* 21 5* 21 1*   PLATELETS Thousands/uL 154  --  151 164 163   POTASSIUM mmol/L 4 7  --  4 8 5 1 4 9   CHLORIDE mmol/L 106  --  105 104 112*   CO2 mmol/L 16*  --  19* 20* 18*   BUN mg/dL 62*  --  57* 56* 49*   CREATININE mg/dL 4 48*  --  4 44* 4 42* 4 19*   CALCIUM mg/dL 8 3  --  8 2* 8 7 8 4   MAGNESIUM mg/dL  --   --  2 7*  --  2 9*   PHOSPHORUS mg/dL  --   --  5 3*  --  4 6*     Urinalysis: Lab Results   Component Value Date    COLORU Yellow 01/17/2020    COLORU Yellow 07/29/2016    CLARITYU Turbid 01/17/2020    CLARITYU Cloudy 07/29/2016    SPECGRAV 1 015 01/17/2020    SPECGRAV 1 010 07/29/2016    PHUR 8 0 01/17/2020    PHUR 7 5 11/05/2019    PHUR 7 5 07/29/2016    LEUKOCYTESUR Large (A) 01/17/2020    LEUKOCYTESUR 3+ 07/29/2016    NITRITE Negative 01/17/2020    NITRITE NEG 07/29/2016    PROTEINUA 1+ 07/29/2016    GLUCOSEU Negative 01/17/2020    GLUCOSEU Negative 07/20/2015    KETONESU Negative 01/17/2020    KETONESU NEG 07/29/2016    BILIRUBINUR Negative 01/17/2020    BILIRUBINUR NEG 07/29/2016    BLOODU Trace-Intact (A) 01/17/2020    BLOODU NEG 07/29/2016     ABGs: No results found for: [place]  Radiology review:     Portions of the record may have been created with voice recognition software  Occasional wrong word or "sound a like" substitutions may have occurred due to the inherent limitations of voice recognition software  Read the chart carefully and recognize, using context, where substitutions have occurred

## 2020-01-19 NOTE — ASSESSMENT & PLAN NOTE
Lab Results   Component Value Date    HGBA1C 4 9 12/28/2019     Recent Labs     01/18/20  1131 01/18/20  1551 01/18/20  2105 01/19/20  0725   POCGLU 167* 117 109 110     Blood Sugar Average: Last 72 hrs:  · (P) 136 [id number]   · Continue Lantus 5 units q h s , resume Toujeo at d/c  · SSI, accuchecks, ADA

## 2020-01-19 NOTE — ASSESSMENT & PLAN NOTE
· Urine culture positive for gram negative rods, ID following   · Known chronic bacteriuria with intermittent dysuria   · Per infectious disease, continue Daptomycin 450mg every other day   · Monitor I/O, fever/chills, WBC

## 2020-01-19 NOTE — PROGRESS NOTES
Progress Note - Grace Elder 1965, 53 y o  female MRN: [id number]  Unit/Bed#: S -01 Encounter: [id number] DOS: 01/19/2020  Primary Care Provider: Lois Campo MD   Date and time admitted to hospital: 1/17/2020  2:09 PM     Acute renal failure superimposed on stage 4 chronic kidney disease ([facility] [address] )  Assessment & Plan  · Patient presents with acute renal failure superimposed on chronic kidney disease, creatinine 4 42 with baseline most recently 2 5-3 0    · Creatinine now 4 48  · Possibly pre renal due to recent diuresis, frequent diarrhea at home on PO diuretics, and severe anemia  · 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 and follows Dr Marciano Miranda  She is status post kidney and pancreas transplantation 1998 on prednisone 5 mg daily, tacrolimus 2 mg b i d   · Presented to hospital at the request of her outpatient nephrologist due to worsening creatinine and anemia  · Avoid hypotension, nephrotoxins  · Per Nephrology recommendation, restarted torsemide 20mg qd 1/18  · Continue to hold Aldactone  · Continue sodium bicarb for met acidosis and phosphate binder for hyperphos   · Hold IVF for now     * Anemia  Assessment & Plan  · Patient admitted with hemoglobin 6 5  Baseline hemoglobin mid 7 range with known his anemia of chronic disease and multiple myeloma  Hgb 5 8 --> 8 3  · May be due to worsening renal function, however will rule out GI bleeding and hemolysis  · FOBT pending   · LDH normal  Haptoglobin, hemolysis smear pending   · Iron saturation 30%, TIBC 215, iron 65  · Continue iron, folic acid    · 2/74 discussed with pathologist Dr Rupert Hu, blood bank, inpatient lab, Dr Ana Navarro, and MICHELLE Angela  · 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 and has known antibodies to blood  Blood bank only able to match for clinically significant antibodies and due to warm autoantibody, unable to test for less clinically significant antibodies      · Blood on hold currently is least incompatible based on current and previous testing  We are unable to exclude the possibility of other underlying antibodies that we do not routinely check for due to warm auto antibody  · S/p 2 units of least incompatible blood 1/18  Last transfusion on 12/05 also least incompatible  Deviation form signed by myself and Dr Cabello Mask  · Prn tylenol and benadryl ordered     Chronic diastolic congestive heart failure (HCC)  Assessment & Plan  Wt Readings from Last 3 Encounters:   01/19/20 100 kg (220 lb 9 6 oz)   01/17/20 99 3 kg (219 lb)   01/08/20 100 kg (221 lb)   · Echocardiogram most recently with normal ejection fraction, grade 1 diastolic dysfunction with no wall motion abnormalities, moderate aortic regurgitation and trace pericardial effusion  · Last admission patient found to be in acute on chronic diastolic heart failure and was diuresed by Nephrology and received IV albumin and switch to p o   Torsemide  · Renal resumed torsemide 1/18    Chronic constipation  Assessment & Plan  · Evaluated by Gastroenterology last admission for abdominal pain, constipation and fecal stasis  · Reports frequent diarrhea at home while on bowel regimen but now constipated  · Monitor stool output and treat diarrhea versus constipation accordingly  · Outpatient colonoscopy recommended    Benign hypertension with CKD (chronic kidney disease) stage IV ([facility])  Assessment & Plan  · Home regimen includes clonidine 0 2 mg t i d , hydralazine 50 mg Q 8, Cardura 20 mg at bedtime, Lopressor 50 mg twice daily, Norvasc 10 mg daily with hold parameters  · Hold Aldactone 25 mg daily   · Torsemide 20 mg resumed 1/18  · Avoid hypotension    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 [facility])  Assessment & Plan  Lab Results   Component Value Date    HGBA1C 4 9 12/28/2019     Recent Labs     01/18/20  1131 01/18/20  1551 01/18/20  2105 01/19/20  0725   POCGLU 167* 117 109 110     Blood Sugar Average: Last 72 hrs:  · (P) 211 [id number]   · Continue Lantus 5 units q h s , resume Toujeo at d/c  · SSI, accuchecks, ADA     Asymptomatic bacteriuria  Assessment & Plan  · Urine culture positive for gram negative rods, ID following   · Known chronic bacteriuria with intermittent dysuria   · Per infectious disease, continue Daptomycin 450mg every other day   · Monitor I/O, fever/chills, WBC    Multiple myeloma not having achieved remission ([facility] [address] )  Assessment & Plan  · Follows with Dr Francine Aj for known history of IgG kappa multiple myeloma stage III diagnosed in April 2019 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  · Currently off Revlimid due to frequent readmissions to the hospital for volume overload, constipation/fecal impaction/colitis, urinary tract infection    Anxiety  Assessment & Plan  · Continue Cymbalta, Abilify, BuSpar, Requip, Zoloft, prn ativan     Atrial fibrillation (HCC)  Assessment & Plan  · Continue lopressor     Acquired hypothyroidism  Assessment & Plan  · Continue Synthroid 125mcg qd    VTE Pharmacologic Prophylaxis:   Pharmacologic: Pharmacologic VTE Prophylaxis contraindicated due to acute anemia  Mechanical VTE Prophylaxis in Place: Yes    Patient Centered Rounds: I have performed bedside rounds with nursing staff today  Discussions with Specialists or Other Care Team Provider: nursing     Education and Discussions with Family / Patient: patient, family at bedside     Time Spent for Care: 30 minutes  More than 50% of total time spent on counseling and coordination of care as described above      Current Length of Stay: 2 day(s)    Current Patient Status: Inpatient   Certification Statement: The patient will continue to require additional inpatient hospital stay due to pending improvement in renal function     Discharge Plan: pending clinical course     Code Status: Level 1 - Full Code    Subjective:   Patient is a 46yoF with PMH of stage 4 CKD s/p renal transplant in 1998, multiple myeloma, chronic diastolic CHF, hypothyroidism, and anemia of chronic disease  She is currently admitted due to abnormal lab results found on outpatient lab work, specifically worsening creatinine and low hemoglobin  Today she is alert and awake, sitting in bedside chair  Today she states "I feel like I have a UTI " She complains of burning with urination, denies frequency, urgency  States that she is constipated and has not had a BM in 3 days  Denies chest pain, SOB, fever/chills, N/V, headache, lightheadedness, dizziness  She is wondering if she will have to go on dialysis  Objective:     Vitals:   Temp (24hrs), Av 8 °F (36 6 °C), Min:97 5 °F (36 4 °C), Max:98 1 °F (36 7 °C)    Temp:  [97 5 °F (36 4 °C)-98 1 °F (36 7 °C)] 97 5 °F (36 4 °C)  HR:  [57-62] 60  Resp:  [16-18] 18  BP: (163-182)/(69-74) 163/72  SpO2:  [92 %-96 %] 95 %  Body mass index is 34 55 kg/m²  Input and Output Summary (last 24 hours): Intake/Output Summary (Last 24 hours) at 2020 1343  Last data filed at 2020 6989  Gross per 24 hour   Intake 710 ml   Output 1050 ml   Net -340 ml     Physical Exam:     Physical Exam   Constitutional: She is oriented to person, place, and time  She appears well-developed and well-nourished  HENT:   Head: Normocephalic and atraumatic  Eyes: Conjunctivae are normal    Neck: Neck supple  No JVD present  Cardiovascular: Normal rate, regular rhythm and normal heart sounds  No murmur heard  Pulmonary/Chest: Effort normal and breath sounds normal  No respiratory distress  She has no wheezes  She has no rales  Abdominal: Soft  Bowel sounds are normal  She exhibits distension  She exhibits no mass  There is no tenderness  Musculoskeletal: She exhibits edema (mild)  Neurological: She is alert and oriented to person, place, and time  Skin: Skin is warm and dry  No pallor  Psychiatric: She has a normal mood and affect     Nursing note and vitals reviewed  Additional Data:    Labs:    Results from last 7 days   Lab Units 01/19/20  0514  01/17/20  1516   WBC Thousand/uL 8 55   < > 7 32   HEMOGLOBIN g/dL 8 3*   < > 6 5*   HEMATOCRIT % 26 2*   < > 21 5*   PLATELETS Thousands/uL 154   < > 164   NEUTROS PCT %  --   --  78*   LYMPHS PCT %  --   --  13*   MONOS PCT %  --   --  3*   EOS PCT %  --   --  4    < > = values in this interval not displayed  Results from last 7 days   Lab Units 01/19/20  0514  01/17/20  0758   SODIUM mmol/L 135*   < > 140   POTASSIUM mmol/L 4 7   < > 4 9   CHLORIDE mmol/L 106   < > 112*   CO2 mmol/L 16*   < > 18*   BUN mg/dL 62*   < > 49*   CREATININE mg/dL 4 48*   < > 4 19*   ANION GAP mmol/L 13   < > 10   CALCIUM mg/dL 8 3   < > 8 4   ALBUMIN g/dL  --   --  3 1*   TOTAL BILIRUBIN mg/dL  --   --  0 53   ALK PHOS U/L  --   --  95   ALT U/L  --   --  14   AST U/L  --   --  7   GLUCOSE RANDOM mg/dL 104   < >  --     < > = values in this interval not displayed  Results from last 7 days   Lab Units 01/17/20  1516   INR  1 09     Results from last 7 days   Lab Units 01/19/20  1112 01/19/20  0725 01/18/20  2105 01/18/20  1551 01/18/20  1131 01/18/20  0744 01/17/20  2057 01/17/20  1715   POC GLUCOSE mg/dl 119 110 109 117 167* 119 101 232*               * I Have Reviewed All Lab Data Listed Above  * Additional Pertinent Lab Tests Reviewed:  [address] Admission Reviewed    Imaging:    Imaging Reports Reviewed Today Include: all  Imaging Personally Reviewed by Myself Includes:  None     Recent Cultures (last 7 days):     Results from last 7 days   Lab Units 01/17/20  2019   URINE CULTURE  50,000-59,000 cfu/ml Gram Negative Jerald Enteric Like*       Last 24 Hours Medication List:     Current Facility-Administered Medications:  acetaminophen 650 mg Oral Q6H PRN Praful Aden PA-C    amLODIPine 10 mg Oral Daily Praful Aden PA-C    ARIPiprazole 30 mg Oral HS Praful Aden PA-C    aspirin 81 mg Oral Daily Praful VAQSUEZ JOHN Aden    busPIRone 5 mg Oral BID Donivan President Markie PA-C    cholecalciferol 3,000 Units Oral Daily Praful Aden PA-C    cloNIDine 0 2 mg Oral Q8H [address] Praful Aden PA-C    DAPTOmycin 6 mg/kg (Adjusted) Intravenous Q48H Yandy Leslie MD Last Rate: 450 mg (01/18/20 1459)   diphenhydrAMINE 25 mg Intravenous Q6H PRN Praful Aden PA-C    docusate sodium 100 mg Oral BID Praful Aden PA-C    doxazosin 2 mg Oral HS Praful Aden PA-C    DULoxetine 60 mg Oral BID Praful Aden PA-C    ferrous sulfate 325 mg Oral Daily With Breakfast Praful Aden PA-C    folic acid 3,917 mcg Oral Daily Praful Aden PA-C    hydrALAZINE 50 mg Oral TID Praful Aden PA-C    insulin glargine 4 Units Subcutaneous HS Leana Rodriguez MD    insulin lispro 1-5 Units Subcutaneous HS Praful Aden PA-C    insulin lispro 1-5 Units Subcutaneous TID AC Praful Aden PA-C    levothyroxine 125 mcg Oral Daily Praful Aden PA-C    LORazepam 2 mg Oral TID PRN Titus Aden PA-C    metoprolol tartrate 50 mg Oral Q12H [address] Evelyn Harper MD    polyethylene glycol 17 g Oral Daily Praful Aden PA-C    pravastatin 80 mg Oral Daily With [facility] [employer] Markie PA-C    predniSONE 5 mg Oral Daily Praful Aden PA-C    rOPINIRole 0 25 mg Oral BID Praful Aden PA-C    senna 1 tablet Oral Daily Praful Aden PA-C    sertraline 200 mg Oral Daily Praful Aden PA-C    sevelamer 1,600 mg Oral TID With Meals Praful Aden PA-C    sodium bicarbonate 1,300 mg Oral TID Praful Aden PA-C    tacrolimus 2 mg Oral Q12H [address] Praful Aden PA-C    torsemide 20 mg Oral Daily Evelyn Harper MD         Today, Patient Was Seen By: Frida Simpson PA-C    ** Please Note: Dictation voice to text software may have been used in the creation of this document   **

## 2020-01-19 NOTE — PLAN OF CARE
Problem: PHYSICAL THERAPY ADULT  Goal: Performs mobility at highest level of function for planned discharge setting  See evaluation for individualized goals  Description  Treatment/Interventions: Functional transfer training, LE strengthening/ROM, Elevations, Therapeutic exercise, Endurance training, Patient/family training, Equipment eval/education, Bed mobility, Gait training  Equipment Recommended: Cane(trial walker next session)       See flowsheet documentation for full assessment, interventions and recommendations  1/19/2020 1501 by Perry Johnson PT  Note:   Prognosis: Good  Problem List: Decreased strength, Decreased endurance, Impaired balance, Decreased mobility, Obesity(Gait deviations)  Assessment:  Pt introduced RW to pt for this admission  Pt demonstrates good mobility technique w/ rolling walker, requires less assistance w/ RW than with cane  PT spent time educating pt on using RW in the community  Pt verbalizes concerns w/ falling especially when out of the house  PT recommended that pt keep RW in her car (pt states is currently sitting in a closet and never used because her home is too narrow for the walker) and to use when feeling unsteady, tired, or for uneven surfaced  Pt verbalizes understanding  Recommendation: Outpatient PT(Continue w/ OPPT for balance)     PT - OK to Discharge: Yes(When medically cleared)        See flowsheet documentation for full assessment

## 2020-01-19 NOTE — ASSESSMENT & PLAN NOTE
· Home regimen includes clonidine 0 2 mg t i d , hydralazine 50 mg Q 8, Cardura 20 mg at bedtime, Lopressor 50 mg twice daily, Norvasc 10 mg daily with hold parameters  · Hold Aldactone 25 mg daily   · Torsemide 20 mg resumed 1/18  · Avoid hypotension

## 2020-01-19 NOTE — PLAN OF CARE
Problem: PHYSICAL THERAPY ADULT  Goal: Performs mobility at highest level of function for planned discharge setting  See evaluation for individualized goals  Description  Treatment/Interventions: Functional transfer training, LE strengthening/ROM, Elevations, Therapeutic exercise, Endurance training, Patient/family training, Equipment eval/education, Bed mobility, Gait training  Equipment Recommended: Cane(trial walker next session)       See flowsheet documentation for full assessment, interventions and recommendations  Note:   Prognosis: Good  Problem List: Decreased strength, Decreased endurance, Impaired balance, Decreased mobility, Obesity(Gait deviations)  Assessment: Sai Dorsey is a 54 y/o Female who [facility]2020 at the recommendation of her nephrologist due to anemic hemoglobin and increasing creatinine   Dx of anemia, EDUARDO superimposed on CKD stage 4  Order placed for PT eval and tx, w/ activity order of up w/ A and fall precautions  Pt presents w/ comorbidities of CHF, chronic constipation, HTN, CKD stage 4 (kidney transplant in 1998), DMI (controlled), multiple myeloma stage III, Afib, acquired hypothyroidism, amputation of all digits on L foot and great toe of R  and personal factors of mobilizing w/ assistive device, stair(s) to enter home, limited home support and positive fall history  Pt presents w/ weakness, decreased endurance, impaired balance, gait deviations and LE edema  These impairments are evident in findings from physical examination (weakness and edema of extremities), mobility assessment (need for min A to mod (I) assist w/ all phases of mobility when usually mobilizing independently, need for cueing for mobility technique and need for cueing for safety on stairs), and Barthel Index: 85/100 and 4 item mDGI = 8/12 (<10/12 is at increased risk of falls)  Pt needed input for mobility technique  Pt is at risk for falls due to physical deficits   Pt's clinical presentation is unstable/unpredictable (evident in need for assist w/ all phases of mobility when usually mobilizing independently and need for input for mobility technique, hx of falls)  Pt needs inpatient PT tx to improve mobility deficits  Discharge recommendation is for outpatient PT in order to reduce fall risk and maximize level of functional independence  Recommendation: Outpatient PT(Continue w/ OPPT for balance)     PT - OK to Discharge: Yes(When medically cleared)    See flowsheet documentation for full assessment

## 2020-01-19 NOTE — ASSESSMENT & PLAN NOTE
· Patient admitted with hemoglobin 6 5  Baseline hemoglobin mid 7 range with known his anemia of chronic disease and multiple myeloma  Hgb 5 8 --> 8 3  · May be due to worsening renal function, however will rule out GI bleeding and hemolysis  · FOBT pending   · LDH normal  Haptoglobin, hemolysis smear pending   · Iron saturation 30%, TIBC 215, iron 65  · Continue iron, folic acid    · 1/39 discussed with pathologist Dr Mago Baires, blood bank, inpatient lab, Dr Rafa Rondon, and RN Chelly العلي  · 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 and has known antibodies to blood  Blood bank only able to match for clinically significant antibodies and due to warm autoantibody, unable to test for less clinically significant antibodies  · Blood on hold currently is least incompatible based on current and previous testing  We are unable to exclude the possibility of other underlying antibodies that we do not routinely check for due to warm auto antibody  · S/p 2 units of least incompatible blood 1/18  Last transfusion on 12/05 also least incompatible  Deviation form signed by myself and Dr Rafa Rondon     · Prn tylenol and benadryl ordered

## 2020-01-19 NOTE — ASSESSMENT & PLAN NOTE
Wt Readings from Last 3 Encounters:   01/19/20 100 kg (220 lb 9 6 oz)   01/17/20 99 3 kg (219 lb)   01/08/20 100 kg (221 lb)   · Echocardiogram most recently with normal ejection fraction, grade 1 diastolic dysfunction with no wall motion abnormalities, moderate aortic regurgitation and trace pericardial effusion  · Last admission patient found to be in acute on chronic diastolic heart failure and was diuresed by Nephrology and received IV albumin and switch to p o   Torsemide  · Renal resumed torsemide 1/18

## 2020-01-19 NOTE — ASSESSMENT & PLAN NOTE
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

## 2020-01-20 ENCOUNTER — APPOINTMENT (OUTPATIENT)
Dept: PHYSICAL THERAPY | Facility: CLINIC | Age: 55
End: 2020-01-20
Payer: MEDICARE

## 2020-01-20 ENCOUNTER — APPOINTMENT (INPATIENT)
Dept: ULTRASOUND IMAGING | Facility: HOSPITAL | Age: 55
DRG: 683 | End: 2020-01-20
Payer: MEDICARE

## 2020-01-20 LAB
ANION GAP SERPL CALCULATED.3IONS-SCNC: 13 MMOL/L (ref 4–13)
BACTERIA UR CULT: ABNORMAL
BACTERIA UR CULT: ABNORMAL
BASOPHILS # BLD AUTO: 0.04 THOUSANDS/ΜL (ref 0–0.1)
BASOPHILS NFR BLD AUTO: 1 % (ref 0–1)
BLOOD GROUP ANTIBODIES SERPL: NORMAL
BLOOD GROUP ANTIBODIES SERPL: NORMAL
BUN SERPL-MCNC: 65 MG/DL (ref 5–25)
CALCIUM SERPL-MCNC: 7.9 MG/DL (ref 8.3–10.1)
CHLORIDE SERPL-SCNC: 107 MMOL/L (ref 100–108)
CO2 SERPL-SCNC: 18 MMOL/L (ref 21–32)
CREAT SERPL-MCNC: 4.37 MG/DL (ref 0.6–1.3)
EOSINOPHIL # BLD AUTO: 0.33 THOUSAND/ΜL (ref 0–0.61)
EOSINOPHIL NFR BLD AUTO: 4 % (ref 0–6)
ERYTHROCYTE [DISTWIDTH] IN BLOOD BY AUTOMATED COUNT: 16.6 % (ref 11.6–15.1)
GFR SERPL CREATININE-BSD FRML MDRD: 11 ML/MIN/1.73SQ M
GLUCOSE SERPL-MCNC: 101 MG/DL (ref 65–140)
GLUCOSE SERPL-MCNC: 106 MG/DL (ref 65–140)
GLUCOSE SERPL-MCNC: 110 MG/DL (ref 65–140)
GLUCOSE SERPL-MCNC: 131 MG/DL (ref 65–140)
GLUCOSE SERPL-MCNC: 134 MG/DL (ref 65–140)
HCT VFR BLD AUTO: 25 % (ref 34.8–46.1)
HGB BLD-MCNC: 7.9 G/DL (ref 11.5–15.4)
IMM GRANULOCYTES # BLD AUTO: 0.09 THOUSAND/UL (ref 0–0.2)
IMM GRANULOCYTES NFR BLD AUTO: 1 % (ref 0–2)
LYMPHOCYTES # BLD AUTO: 0.96 THOUSANDS/ΜL (ref 0.6–4.47)
LYMPHOCYTES NFR BLD AUTO: 11 % (ref 14–44)
MCH RBC QN AUTO: 31.9 PG (ref 26.8–34.3)
MCHC RBC AUTO-ENTMCNC: 31.6 G/DL (ref 31.4–37.4)
MCV RBC AUTO: 101 FL (ref 82–98)
MONOCYTES # BLD AUTO: 0.69 THOUSAND/ΜL (ref 0.17–1.22)
MONOCYTES NFR BLD AUTO: 8 % (ref 4–12)
NEUTROPHILS # BLD AUTO: 6.42 THOUSANDS/ΜL (ref 1.85–7.62)
NEUTS SEG NFR BLD AUTO: 75 % (ref 43–75)
NRBC BLD AUTO-RTO: 0 /100 WBCS
PLATELET # BLD AUTO: 154 THOUSANDS/UL (ref 149–390)
PMV BLD AUTO: 12.8 FL (ref 8.9–12.7)
POTASSIUM SERPL-SCNC: 4.4 MMOL/L (ref 3.5–5.3)
RBC # BLD AUTO: 2.48 MILLION/UL (ref 3.81–5.12)
SODIUM SERPL-SCNC: 138 MMOL/L (ref 136–145)
WBC # BLD AUTO: 8.53 THOUSAND/UL (ref 4.31–10.16)

## 2020-01-20 PROCEDURE — 97116 GAIT TRAINING THERAPY: CPT

## 2020-01-20 PROCEDURE — 82948 REAGENT STRIP/BLOOD GLUCOSE: CPT

## 2020-01-20 PROCEDURE — 97535 SELF CARE MNGMENT TRAINING: CPT

## 2020-01-20 PROCEDURE — 99232 SBSQ HOSP IP/OBS MODERATE 35: CPT | Performed by: FAMILY MEDICINE

## 2020-01-20 PROCEDURE — 80048 BASIC METABOLIC PNL TOTAL CA: CPT | Performed by: INTERNAL MEDICINE

## 2020-01-20 PROCEDURE — 99232 SBSQ HOSP IP/OBS MODERATE 35: CPT | Performed by: INTERNAL MEDICINE

## 2020-01-20 PROCEDURE — 85025 COMPLETE CBC W/AUTO DIFF WBC: CPT | Performed by: INTERNAL MEDICINE

## 2020-01-20 PROCEDURE — 76776 US EXAM K TRANSPL W/DOPPLER: CPT

## 2020-01-20 RX ADMIN — CLONIDINE HYDROCHLORIDE 0.2 MG: 0.1 TABLET ORAL at 05:34

## 2020-01-20 RX ADMIN — METOPROLOL TARTRATE 50 MG: 50 TABLET, FILM COATED ORAL at 09:34

## 2020-01-20 RX ADMIN — FOLIC ACID 1000 MCG: 1 TABLET ORAL at 09:33

## 2020-01-20 RX ADMIN — DOCUSATE SODIUM 100 MG: 100 CAPSULE, LIQUID FILLED ORAL at 09:35

## 2020-01-20 RX ADMIN — CLONIDINE HYDROCHLORIDE 0.2 MG: 0.1 TABLET ORAL at 21:00

## 2020-01-20 RX ADMIN — SEVELAMER HYDROCHLORIDE 1600 MG: 800 TABLET, FILM COATED PARENTERAL at 09:36

## 2020-01-20 RX ADMIN — DOXAZOSIN 2 MG: 1 TABLET ORAL at 21:00

## 2020-01-20 RX ADMIN — DULOXETINE HYDROCHLORIDE 60 MG: 60 CAPSULE, DELAYED RELEASE ORAL at 18:03

## 2020-01-20 RX ADMIN — HYDRALAZINE HYDROCHLORIDE 75 MG: 25 TABLET ORAL at 21:00

## 2020-01-20 RX ADMIN — LEVOTHYROXINE SODIUM 125 MCG: 125 TABLET ORAL at 05:34

## 2020-01-20 RX ADMIN — TACROLIMUS 2 MG: 1 CAPSULE ORAL at 09:34

## 2020-01-20 RX ADMIN — POLYETHYLENE GLYCOL 3350 17 G: 17 POWDER, FOR SOLUTION ORAL at 09:35

## 2020-01-20 RX ADMIN — FERROUS SULFATE TAB 325 MG (65 MG ELEMENTAL FE) 325 MG: 325 (65 FE) TAB at 09:35

## 2020-01-20 RX ADMIN — METOPROLOL TARTRATE 50 MG: 50 TABLET, FILM COATED ORAL at 21:00

## 2020-01-20 RX ADMIN — MELATONIN 3000 UNITS: at 09:35

## 2020-01-20 RX ADMIN — ROPINIROLE HYDROCHLORIDE 0.25 MG: 0.25 TABLET, FILM COATED ORAL at 18:03

## 2020-01-20 RX ADMIN — ROPINIROLE HYDROCHLORIDE 0.25 MG: 0.25 TABLET, FILM COATED ORAL at 09:34

## 2020-01-20 RX ADMIN — TORSEMIDE 20 MG: 20 TABLET ORAL at 09:34

## 2020-01-20 RX ADMIN — SEVELAMER HYDROCHLORIDE 1600 MG: 800 TABLET, FILM COATED PARENTERAL at 13:32

## 2020-01-20 RX ADMIN — SERTRALINE HYDROCHLORIDE 200 MG: 100 TABLET ORAL at 09:34

## 2020-01-20 RX ADMIN — SODIUM BICARBONATE 650 MG TABLET 1300 MG: at 21:00

## 2020-01-20 RX ADMIN — SEVELAMER HYDROCHLORIDE 1600 MG: 800 TABLET, FILM COATED PARENTERAL at 18:04

## 2020-01-20 RX ADMIN — TACROLIMUS 2 MG: 1 CAPSULE ORAL at 21:00

## 2020-01-20 RX ADMIN — SODIUM BICARBONATE 650 MG TABLET 1300 MG: at 18:03

## 2020-01-20 RX ADMIN — BUSPIRONE HYDROCHLORIDE 5 MG: 5 TABLET ORAL at 18:03

## 2020-01-20 RX ADMIN — PRAVASTATIN SODIUM 80 MG: 80 TABLET ORAL at 18:03

## 2020-01-20 RX ADMIN — SENNOSIDES 8.6 MG: 8.6 TABLET, FILM COATED ORAL at 09:34

## 2020-01-20 RX ADMIN — DULOXETINE HYDROCHLORIDE 60 MG: 60 CAPSULE, DELAYED RELEASE ORAL at 09:35

## 2020-01-20 RX ADMIN — CEFTRIAXONE 1000 MG: 2 INJECTION, POWDER, FOR SOLUTION INTRAMUSCULAR; INTRAVENOUS at 14:59

## 2020-01-20 RX ADMIN — AMLODIPINE BESYLATE 10 MG: 10 TABLET ORAL at 09:34

## 2020-01-20 RX ADMIN — SODIUM BICARBONATE 650 MG TABLET 1300 MG: at 09:33

## 2020-01-20 RX ADMIN — CLONIDINE HYDROCHLORIDE 0.2 MG: 0.1 TABLET ORAL at 13:32

## 2020-01-20 RX ADMIN — ARIPIPRAZOLE 30 MG: 10 TABLET ORAL at 21:00

## 2020-01-20 RX ADMIN — HYDRALAZINE HYDROCHLORIDE 75 MG: 25 TABLET ORAL at 18:03

## 2020-01-20 RX ADMIN — BUSPIRONE HYDROCHLORIDE 5 MG: 5 TABLET ORAL at 09:35

## 2020-01-20 RX ADMIN — PREDNISONE 5 MG: 5 TABLET ORAL at 09:34

## 2020-01-20 RX ADMIN — HYDRALAZINE HYDROCHLORIDE 75 MG: 25 TABLET ORAL at 09:35

## 2020-01-20 RX ADMIN — INSULIN GLARGINE 4 UNITS: 100 INJECTION, SOLUTION SUBCUTANEOUS at 21:00

## 2020-01-20 RX ADMIN — ASPIRIN 81 MG 81 MG: 81 TABLET ORAL at 09:33

## 2020-01-20 RX ADMIN — DOCUSATE SODIUM 100 MG: 100 CAPSULE, LIQUID FILLED ORAL at 18:03

## 2020-01-20 NOTE — ASSESSMENT & PLAN NOTE
· Urine culture positive for gram negative rods, ID following   · Known chronic bacteriuria with intermittent dysuria     Patient did start having some UTI symptoms  Daptomycin was switched to Rocephin  Id following

## 2020-01-20 NOTE — ASSESSMENT & PLAN NOTE
Wt Readings from Last 3 Encounters:   01/20/20 99 kg (218 lb 4 1 oz)   01/17/20 99 3 kg (219 lb)   01/08/20 100 kg (221 lb)   · Echocardiogram most recently with normal ejection fraction, grade 1 diastolic dysfunction with no wall motion abnormalities, moderate aortic regurgitation and trace pericardial effusion  · Last admission patient found to be in acute on chronic diastolic heart failure and was diuresed by Nephrology and received IV albumin and switch to p o   Torsemide  · Renal resumed torsemide 1/18

## 2020-01-20 NOTE — PHYSICAL THERAPY NOTE
PHYSICAL THERAPY TREATMENT NOTE    Patient Name: Hebert LYNCH Date: 20 1547   Pain Assessment   Pain Assessment No/denies pain   Pain Score No Pain   Restrictions/Precautions   Weight Bearing Precautions Per Order No   Other Precautions Fall Risk   General   Chart Reviewed Yes   Response to Previous Treatment Patient with no complaints from previous session  Family/Caregiver Present Yes  (Father)   Cognition   Overall Cognitive Status WFL   Arousal/Participation Cooperative   Attention Within functional limits   Comments Pt identified by name and   Subjective   Subjective Agrees to PT treatment  Bed Mobility   Supine to Sit Unable to assess  (OOB in chair pre/post session)   Transfers   Sit to Stand 6  Modified independent   Additional items Increased time required   Stand to Sit 6  Modified independent   Additional items Increased time required   Additional Comments Pt declines wearing specialty shoes at this time despite encouragement  Ambulation/Elevation   Gait pattern Narrow SLIM; Decreased foot clearance  (LLE in ER)   Gait Assistance 5  Supervision   Additional items Verbal cues   Assistive Device Straight cane   Distance 320` x1 and 20` x1   Stair Management Assistance 4  Minimal assist   Additional items Assist x 1;Verbal cues   Stair Management Technique With walker  (on step stool)   Number of Stairs 4   Balance   Static Sitting Good   Static Standing Fair +   Dynamic Standing Fair   Ambulatory Fair   Endurance Deficit   Endurance Deficit Yes   Endurance Deficit Description limited ambulation distance/mild SOB    Activity Tolerance   Activity Tolerance Patient limited by fatigue   Nurse Made Aware Per RN, pt appropriate to treat   Assessment   Prognosis Good   Problem List Decreased strength;Decreased endurance; Impaired balance;Decreased mobility;Obesity   Assessment Pt tolerated treatment well and is progressing with overall functional mobility    Able to ambulate increased distance with increased speed as pt reports  Pt educated on energy conservation and safety with mobility including decreasing gait speed and taking standing rest breaks as needed  Able to complete step stool x4 with use of RW and min/CGA  Pt reports feeling more confident in her ability to manage the steps  Educated on importance of increasing mobility in hallway with staff and to use the RW if mobilizing independently in room  Will continue to benefit from ongoing skilled PT to maximize her functional mobility and increase her level of independence  Anticipating pt will be able to return home with OPPT at time of discharge  Goals   Patient Goals improve my balance   STG Expiration Date 01/29/20   PT Treatment Day 1   Plan   Treatment/Interventions Functional transfer training;LE strengthening/ROM; Elevations; Therapeutic exercise; Endurance training;Patient/family training;Equipment eval/education; Bed mobility;Gait training   Progress Slow progress, decreased activity tolerance   PT Frequency 1-2x/wk   Recommendation   Recommendation Outpatient PT   Equipment Recommended Patricia Kingsley, PT,DPT

## 2020-01-20 NOTE — PROGRESS NOTES
[address] [facility] NOTE   Eric Orosco 53 y o  female MRN: [id number]  Unit/Bed#: S -01 Encounter: [id number]  Reason for Consult: EDUARDO    ASSESSMENT and PLAN:  1  Elevated creatinine (POA):  · Has had progressive decline in renal function over the past few months  · Admit with creatinine of 4 42 on 1/17/20  · Unclear if due to EDUARDO or progressive CKD  (Prerenal due to anemia?)  · Will repeat CMV, EBV, and BK virus testing  · Renal US is pending  · Bladder scans were negative  (She has a hx of urinary retention)  · Monitor renal function with diuretics restarted  2  CKD IV s/p kidney and pancreas transplant in 1998  · Follows with Dr Jaden Paez in the office  · Creatinine was in the high 1s in early 2019 but was 2 5 to 3 0 in Jan 2020  · Worsening renal function felt to be multifactorial - recurrent UTI vs urinary retention vs MGRS vs CAN?    · Immunosuppression: Tac 2 mg BID, Prednisone 5 mg daily  3  Anemia:  · Received PRBC transfusion  · Admit with Hgb of 6 5 and was down to 5 8 on 1/18/20  · Received PRBC transfusion  4  Multiple myeloma:  · Followed by Dr Piero Brady  5  HTN:  · Restarted Torsemide 20 mg daily on 1/18/20  · BP high with Hydralazine 75 mg TID, Metoprolol 50 mg BID, Doxazosin 2 mg daily, Clonidine 0 2 mg TID, and Amlodipine 10 mg daily  · Monitor for now since Hydralazine only recently increased  6  UTI: on Daptomycin  7  Metabolic acidosis: Continue sodium bicarbonate 1300 mg TID  CO2 is improving  8  MBD: Continue Sevelamer 1600 mg TID for hyperphos        DISPOSITION:  · Check for CMV, EBV and BK virus  · Follow up renal US  · Monitor renal function with diuretics  SUBJECTIVE / INTERVAL HISTORY:  She feels okay  No CP or SOB       OBJECTIVE:  Current Weight: Weight - Scale: 99 kg (218 lb 4 1 oz)  Vitals:    01/19/20 2201 01/20/20 0530 01/20/20 0600 01/20/20 0737   BP:  158/72  150/56   BP Location:  Right arm  Left arm   Pulse: 58  61   Resp: 18   16   Temp: 98 1 °F (36 7 °C)   98 3 °F (36 8 °C)   TempSrc: Oral   Oral   SpO2: 94%   94%   Weight:   99 kg (218 lb 4 1 oz)    Height:           Intake/Output Summary (Last 24 hours) at 1/20/2020 0852  Last data filed at 1/20/2020 0737  Gross per 24 hour   Intake 360 ml   Output 650 ml   Net -290 ml     General: Conscious, coherent, cooperative, no distress  Skin: No new rash  HEENT: Pale conjunctivae, no scleral icterus  Chest/Lungs: Clear breath sounds  CVS: Distinct heart sounds, normal rate  Abdomen: Soft, non tender  Extremities: Trace to mild LE edema  : no hinojosa catheter  Neuro: awake, alert       Medications:    Current Facility-Administered Medications:     acetaminophen (TYLENOL) tablet 650 mg, 650 mg, Oral, Q6H PRN, JODI Keane-C, 650 mg at 01/18/20 1104    amLODIPine (NORVASC) tablet 10 mg, 10 mg, Oral, Daily, JODI Keane-C, 10 mg at 01/19/20 0811    ARIPiprazole (ABILIFY) tablet 30 mg, 30 mg, Oral, HS, JODI Keane-C, 30 mg at 01/19/20 2153    aspirin chewable tablet 81 mg, 81 mg, Oral, Daily, JODI Keane-C, 81 mg at 01/19/20 0811    busPIRone (BUSPAR) tablet 5 mg, 5 mg, Oral, BID, JODI Keane-C, 5 mg at 01/19/20 1739    cholecalciferol (VITAMIN D3) tablet 3,000 Units, 3,000 Units, Oral, Daily, JODI Keane-C, 3,000 Units at 01/19/20 0811    cloNIDine (CATAPRES) tablet 0 2 mg, 0 2 mg, Oral, Q8H [facility], Praful Aden PA-C, 0 2 mg at 01/20/20 0534    DAPTOmycin (CUBICIN) 450 mg in sodium chloride 0 9 % 50 mL IVPB, 6 mg/kg (Adjusted), Intravenous, Q48H, Sade Guerrero MD, Last Rate: 100 mL/hr at 01/18/20 1459, 450 mg at 01/18/20 1459    diphenhydrAMINE (BENADRYL) injection 25 mg, 25 mg, Intravenous, Q6H PRN, Praful Aden PA-C, Stopped at 01/18/20 1700    docusate sodium (COLACE) capsule 100 mg, 100 mg, Oral, BID, Praful Aden PA-C, 100 mg at 01/19/20 1739    doxazosin (CARDURA) tablet 2 mg, 2 mg, Oral, HS, Praful L Aden, JOHN, 2 mg at 01/19/20 2152    DULoxetine (CYMBALTA) delayed release capsule 60 mg, 60 mg, Oral, BID, Praful Aden PA-C, 60 mg at 01/19/20 1739    ferrous sulfate tablet 325 mg, 325 mg, Oral, Daily With Breakfast, Praful Aden PA-C, 325 mg at 46/93/37 6105    folic acid (FOLVITE) tablet 1,000 mcg, 1,000 mcg, Oral, Daily, Praful Aden PA-C, 1,000 mcg at 01/19/20 1625    hydrALAZINE (APRESOLINE) tablet 75 mg, 75 mg, Oral, TID, Sarah Kumar MD, 75 mg at 01/19/20 2151    insulin glargine (LANTUS) subcutaneous injection 4 Units 0 04 mL, 4 Units, Subcutaneous, HS, Sowmya Roach MD, 4 Units at 01/19/20 2153    insulin lispro (HumaLOG) 100 units/mL subcutaneous injection 1-5 Units, 1-5 Units, Subcutaneous, HS, Praful Aden PA-C    insulin lispro (HumaLOG) 100 units/mL subcutaneous injection 1-5 Units, 1-5 Units, Subcutaneous, TID AC, 1 Units at 01/18/20 1132 **AND** Fingerstick Glucose (POCT), , , TID AC, Praful Aden PA-C    levothyroxine tablet 125 mcg, 125 mcg, Oral, Daily, Praful Aden PA-C, 125 mcg at 01/20/20 0534    LORazepam (ATIVAN) tablet 2 mg, 2 mg, Oral, TID PRN, Rossi Aden PA-C    metoprolol tartrate (LOPRESSOR) tablet 50 mg, 50 mg, Oral, Q12H [address], Sarah Kumar MD, 50 mg at 01/19/20 2152    polyethylene glycol (MIRALAX) packet 17 g, 17 g, Oral, Daily, Praful Aden PA-C, Stopped at 01/19/20 1947    pravastatin (PRAVACHOL) tablet 80 mg, 80 mg, Oral, Daily With Dinner, William Aden PA-C, 80 mg at 01/19/20 1624    predniSONE tablet 5 mg, 5 mg, Oral, Daily, Praful Aden PA-C, 5 mg at 01/19/20 0811    rOPINIRole (REQUIP) tablet 0 25 mg, 0 25 mg, Oral, BID, Praful Aden PA-C, 0 25 mg at 01/19/20 1739    senna (SENOKOT) tablet 8 6 mg, 1 tablet, Oral, Daily, Praful Aden PA-C, 8 6 mg at 01/19/20 0811    sertraline (ZOLOFT) tablet 200 mg, 200 mg, Oral, Daily, Praful Aden PA-C, 200 mg at 01/19/20 0811    sevelamer (RENAGEL) tablet 1,600 mg, 1,600 mg, Oral, TID With Meals, Enedina Mondragonajay Aden PA-C, 1,600 mg at 01/19/20 1626    sodium bicarbonate tablet 1,300 mg, 1,300 mg, Oral, TID, Praful Aden PA-C, 1,300 mg at 01/19/20 2152    tacrolimus (PROGRAF) capsule 2 mg, 2 mg, Oral, Q12H [address], Praful Aden PA-C, 2 mg at 01/19/20 2150    torsemide (DEMADEX) tablet 20 mg, 20 mg, Oral, Daily, Elbert Grider MD, 20 mg at 01/19/20 0810    Laboratory Results:  Results from last 7 days   Lab Units 01/20/20  0628 01/19/20  0514 01/18/20  2054 01/18/20  0439 01/17/20  1516 01/17/20  0758   WBC Thousand/uL 8 53 8 55  --  7 63 7 32 8 00   HEMOGLOBIN g/dL 7 9* 8 3* 8 1* 5 8* 6 5* 6 5*   HEMATOCRIT % 25 0* 26 2* 25 5* 19 2* 21 5* 21 1*   PLATELETS Thousands/uL 154 154  --  151 164 163   POTASSIUM mmol/L 4 4 4 7  --  4 8 5 1 4 9   CHLORIDE mmol/L 107 106  --  105 104 112*   CO2 mmol/L 18* 16*  --  19* 20* 18*   BUN mg/dL 65* 62*  --  57* 56* 49*   CREATININE mg/dL 4 37* 4 48*  --  4 44* 4 42* 4 19*   CALCIUM mg/dL 7 9* 8 3  --  8 2* 8 7 8 4   MAGNESIUM mg/dL  --   --   --  2 7*  --  2 9*   PHOSPHORUS mg/dL  --   --   --  5 3*  --  4 6*

## 2020-01-20 NOTE — PROGRESS NOTES
Progress Note - Robert Stevenson 1965, 53 y o  female MRN: [id number]    Unit/Bed#: S -01 Encounter: [id number]    Primary Care Provider: Suzanne Vasquez MD   Date and time admitted to hospital: 1/17/2020  2:09 PM      Asymptomatic bacteriuria  Assessment & Plan  · Urine culture positive for gram negative rods, ID following   · Known chronic bacteriuria with intermittent dysuria     Patient did start having some UTI symptoms  Daptomycin was switched to Rocephin  Id following  Acute renal failure superimposed on stage 4 chronic kidney disease ([facility] [address] )  Assessment & Plan  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 and follows Dr Tomas De La Vega  She is status post kidney and pancreas transplantation 1998 on prednisone 5 mg daily, tacrolimus 2 mg b i d   · Presented to hospital at the request of her outpatient nephrologist due to worsening creatinine and anemia    Creatinine has been stable throughout hospital stay  It was 4 48 yesterday and is 4 37 today  Ultrasound did not show any hydronephrosis or masses of patient's transplanted kidney  Both renal artery and renal vein were patent  Nephrology notes reviewed  Viral testing ordered including EBV, CMV and BK virus  I am wondering if progression of disease could be related to patient's multiple myeloma      Multiple myeloma not having achieved remission [facility])  Assessment & Plan  · Follows with Dr Jaylan Barclay for known history of IgG kappa multiple myeloma stage III diagnosed in April 2019 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  · Currently off Revlimid due to frequent readmissions to the hospital for volume overload, constipation/fecal impaction/colitis, urinary tract infection    Anxiety  Assessment & Plan  · Continue Cymbalta, Abilify, BuSpar, Requip, Zoloft, prn ativan     Atrial fibrillation (HCC)  Assessment & Plan  · Continue lopressor     Chronic constipation  Assessment & Plan  · Evaluated by Gastroenterology last admission for abdominal pain, constipation and fecal stasis  · Reports frequent diarrhea at home while on bowel regimen but now constipated  · Monitor stool output and treat diarrhea versus constipation accordingly  · Outpatient colonoscopy recommended    Chronic diastolic congestive heart failure (HCC)  Assessment & Plan  Wt Readings from Last 3 Encounters:   01/20/20 99 kg (218 lb 4 1 oz)   01/17/20 99 3 kg (219 lb)   01/08/20 100 kg (221 lb)   · Echocardiogram most recently with normal ejection fraction, grade 1 diastolic dysfunction with no wall motion abnormalities, moderate aortic regurgitation and trace pericardial effusion  · Last admission patient found to be in acute on chronic diastolic heart failure and was diuresed by Nephrology and received IV albumin and switch to p o   Torsemide  · Renal resumed torsemide 1/18    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 [facility])  Assessment & Plan  Lab Results   Component Value Date    HGBA1C 4 9 12/28/2019     Recent Labs     01/19/20  2120 01/20/20  0738 01/20/20  1042 01/20/20  1625   POCGLU 107 106 131 134     Blood Sugar Average: Last 72 hrs:  · (P) 128 [id number]   · Continue Lantus 5 units q h s , resume Toujeo at d/c  · SSI, accuchecks, ADA     Benign hypertension with CKD (chronic kidney disease) stage IV ([facility])  Assessment & Plan  · Home regimen includes clonidine 0 2 mg t i d , hydralazine 50 mg Q 8, Cardura 20 mg at bedtime, Lopressor 50 mg twice daily, Norvasc 10 mg daily with hold parameters  · Hold Aldactone 25 mg daily   · Torsemide 20 mg resumed 1/18  · Avoid hypotension    Acquired hypothyroidism  Assessment & Plan  · Continue Synthroid 125mcg qd    * Anemia  Assessment & Plan  · Patient admitted with hemoglobin 6 5  Baseline hemoglobin mid 7 range with known his anemia of chronic disease and multiple myeloma  · Most likely due to chronic kidney disease  ·   · Patient has warm auto antibody but tolerated 2 units of packed red blood cells on 01/18  Hemoglobin malia from 5 8 up to 8 3 yesterday  Today it is 7 9  Will continue to monitor  Subjective/Objective     Subjective:   Patient seen and examined this afternoon  She is doing fine aside from feeling really tired  She expressed concern about being stuck the hospital for long time again but understands that she needs to wait until she is medically stable to go home  No other issues reported  Objective:  Vitals: Blood pressure 136/58, pulse 61, temperature 97 7 °F (36 5 °C), temperature source Oral, resp  rate 18, height 5' 7" (1 702 m), weight 99 kg (218 lb 4 1 oz), SpO2 96 %  ,Body mass index is 34 18 kg/m²  Intake/Output Summary (Last 24 hours) at 1/20/2020 1647  Last data filed at 1/20/2020 1045  Gross per 24 hour   Intake 660 ml   Output 1400 ml   Net -740 ml       Invasive Devices     Peripheral Intravenous Line            Peripheral IV 01/17/20 Left Forearm 3 days                Physical Exam: /58 (BP Location: Right arm)   Pulse 61   Temp 97 7 °F (36 5 °C) (Oral)   Resp 18   Ht 5' 7" (1 702 m)   Wt 99 kg (218 lb 4 1 oz)   LMP  (LMP Unknown)   SpO2 96%   BMI 34 18 kg/m²   General appearance: alert and oriented, in no acute distress  Head: Normocephalic, without obvious abnormality, atraumatic  Eyes: No scleral icterus  Lungs: clear to auscultation bilaterally  Heart: Regular rate and rhythm  2/6 systolic murmur auscultated  Extremities: Trace pitting edema in lower extremities  Left TMA noted  Neurologic: Grossly normal    Lab, Imaging and other studies: I have personally reviewed pertinent reports      VTE Pharmacologic Prophylaxis: Reason for no pharmacologic prophylaxis Anemia  VTE Mechanical Prophylaxis: reason for no mechanical VTE prophylaxis Patient refused

## 2020-01-20 NOTE — ASSESSMENT & PLAN NOTE
Lab Results   Component Value Date    HGBA1C 4 9 12/28/2019     Recent Labs     01/19/20  2120 01/20/20  0738 01/20/20  1042 01/20/20  1625   POCGLU 107 106 131 134     Blood Sugar Average: Last 72 hrs:  · (P) 128 [id number]   · Continue Lantus 5 units q h s , resume Toujeo at d/c  · SSI, accuchecks, ADA

## 2020-01-20 NOTE — ASSESSMENT & PLAN NOTE
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 and follows Dr Mireille Mae  She is status post kidney and pancreas transplantation 1998 on prednisone 5 mg daily, tacrolimus 2 mg b i d   · Presented to hospital at the request of her outpatient nephrologist due to worsening creatinine and anemia    Creatinine has been stable throughout hospital stay  It was 4 48 yesterday and is 4 37 today  Ultrasound did not show any hydronephrosis or masses of patient's transplanted kidney  Both renal artery and renal vein were patent  Nephrology notes reviewed  Viral testing ordered including EBV, CMV and BK virus  I am wondering if progression of disease could be related to patient's multiple myeloma

## 2020-01-20 NOTE — PLAN OF CARE
Problem: PHYSICAL THERAPY ADULT  Goal: Performs mobility at highest level of function for planned discharge setting  See evaluation for individualized goals  Description  Treatment/Interventions: Functional transfer training, LE strengthening/ROM, Elevations, Therapeutic exercise, Endurance training, Patient/family training, Equipment eval/education, Bed mobility, Gait training  Equipment Recommended: Cane(trial walker next session)       See flowsheet documentation for full assessment, interventions and recommendations  Outcome: Progressing  Note:   Prognosis: Good  Problem List: Decreased strength, Decreased endurance, Impaired balance, Decreased mobility, Obesity  Assessment: Pt tolerated treatment well and is progressing with overall functional mobility  Able to ambulate increased distance with increased speed as pt reports  Pt educated on energy conservation and safety with mobility including decreasing gait speed and taking standing rest breaks as needed  Able to complete step stool x4 with use of RW and min/CGA  Pt reports feeling more confident in her ability to manage the steps  Educated on importance of increasing mobility in hallway with staff and to use the RW if mobilizing independently in room  Will continue to benefit from ongoing skilled PT to maximize her functional mobility and increase her level of independence  Anticipating pt will be able to return home with OPPT at time of discharge  Recommendation: Outpatient PT     PT - OK to Discharge: Yes(When medically cleared)    See flowsheet documentation for full assessment

## 2020-01-20 NOTE — PROGRESS NOTES
Progress Note - Infectious Disease   Robert Stevenson 53 y o  female MRN: [id number]  Unit/Bed#: S -01 Encounter: [id number]      Impression/Plan:  1  Symptomatic UTI  Hershell Barrier has history of bladder colonization with MDR pathogens, most recently VRE   Her UA is grossly abnormal and she is now growing E  Steven Clemens is persistent at end of urine stream   Urinary retention concern for contribution to recurrent UTI hx   Discontinue daptomycin  Start ceftriaxone pending final urine culture  Monitor urinary symptoms  Monitor temperature/WBC  Hopefully treat x7 days total      2  EDUARDO, superimposed on CKD   Etiology not entirely clear   Possible prerenal state from diuresis and decreased p o  fluid intake   Creatinine has not improved  Antibiotic dosages adjusted accordingly  Monitor creatinine      3  Status post renal transplant   No evidence of pyelonephritis of transplanted kidney      4  Multiple myeloma   Patient had been on Revlimid previously but on hold due to multiple admissions for acute illnesses recently      Discussed with patient in detail regarding the above plan      Antibiotics:  Daptomycin #3     Subjective:  Patient has dysuria at the end of urination and strong odor yet  No abdominal or flank pain  No LLQT over transplant kidney site  Temperature stays down  No chills  She is tolerating antibiotic well  No nausea, vomiting or diarrhea  Objective:  Vitals:  Temp:  [98 1 °F (36 7 °C)-98 3 °F (36 8 °C)] 98 3 °F (36 8 °C)  HR:  [58-66] 61  Resp:  [16-18] 16  BP: (150-164)/(56-78) 150/56  SpO2:  [94 %] 94 %  Temp (24hrs), Av 2 °F (36 8 °C), Min:98 1 °F (36 7 °C), Max:98 3 °F (36 8 °C)  Current: Temperature: 98 3 °F (36 8 °C)    General Appearance:  Awake, alert, cooperative, resting in recliner, no acute distress  Throat: Oropharynx moist without lesions      Lungs:   Clear to auscultation bilaterally; no wheezes, rhonchi or rales; respirations unlabored   Heart:  RRR; S1-S2 heard, no murmur   Abdomen:   Soft, non-tender, non-distended, positive bowel sounds  Extremities: No edema, arm IV site nontender   : No hinojosa, no SPT or LLQT over transplant kidney  Skin: No rashes      Labs, Imaging, & Other studies:   All pertinent labs and imaging studies were personally reviewed  Results from last 7 days   Lab Units 01/20/20  0628 01/19/20  0514 01/18/20 2054 01/18/20  0439   WBC Thousand/uL 8 53 8 55  --  7 63   HEMOGLOBIN g/dL 7 9* 8 3* 8 1* 5 8*   PLATELETS Thousands/uL 154 154  --  151     Results from last 7 days   Lab Units 01/20/20  0628  01/17/20  0758   POTASSIUM mmol/L 4 4   < > 4 9   CHLORIDE mmol/L 107   < > 112*   CO2 mmol/L 18*   < > 18*   BUN mg/dL 65*   < > 49*   CREATININE mg/dL 4 37*   < > 4 19*   EGFR ml/min/1 73sq m 11   < > 11   CALCIUM mg/dL 7 9*   < > 8 4   AST U/L  --   --  7   ALT U/L  --   --  14   ALK PHOS U/L  --   --  95    < > = values in this interval not displayed           Results from last 7 days   Lab Units 01/17/20  2019   URINE CULTURE  50,000-59,000 cfu/ml Escherichia coli*  10,000-19,000 cfu/ml

## 2020-01-20 NOTE — ASSESSMENT & PLAN NOTE
· Patient admitted with hemoglobin 6 5  Baseline hemoglobin mid 7 range with known his anemia of chronic disease and multiple myeloma  · Most likely due to chronic kidney disease  ·   · Patient has warm auto antibody but tolerated 2 units of packed red blood cells on 01/18  Hemoglobin malia from 5 8 up to 8 3 yesterday  Today it is 7 9  Will continue to monitor

## 2020-01-20 NOTE — ASSESSMENT & PLAN NOTE
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

## 2020-01-21 LAB
ABO GROUP BLD: NORMAL
ANION GAP SERPL CALCULATED.3IONS-SCNC: 12 MMOL/L (ref 4–13)
BLD GP AB SCN SERPL QL: POSITIVE
BUN SERPL-MCNC: 64 MG/DL (ref 5–25)
CALCIUM SERPL-MCNC: 7.9 MG/DL (ref 8.3–10.1)
CHLORIDE SERPL-SCNC: 105 MMOL/L (ref 100–108)
CO2 SERPL-SCNC: 18 MMOL/L (ref 21–32)
CREAT SERPL-MCNC: 4.08 MG/DL (ref 0.6–1.3)
ERYTHROCYTE [DISTWIDTH] IN BLOOD BY AUTOMATED COUNT: 16 % (ref 11.6–15.1)
GFR SERPL CREATININE-BSD FRML MDRD: 12 ML/MIN/1.73SQ M
GLUCOSE SERPL-MCNC: 101 MG/DL (ref 65–140)
GLUCOSE SERPL-MCNC: 101 MG/DL (ref 65–140)
GLUCOSE SERPL-MCNC: 104 MG/DL (ref 65–140)
GLUCOSE SERPL-MCNC: 130 MG/DL (ref 65–140)
GLUCOSE SERPL-MCNC: 132 MG/DL (ref 65–140)
HCT VFR BLD AUTO: 24.5 % (ref 34.8–46.1)
HGB BLD-MCNC: 7.7 G/DL (ref 11.5–15.4)
MCH RBC QN AUTO: 32 PG (ref 26.8–34.3)
MCHC RBC AUTO-ENTMCNC: 31.4 G/DL (ref 31.4–37.4)
MCV RBC AUTO: 102 FL (ref 82–98)
MISCELLANEOUS LAB TEST RESULT: NORMAL
MISCELLANEOUS LAB TEST RESULT: NORMAL
PLATELET # BLD AUTO: 160 THOUSANDS/UL (ref 149–390)
PMV BLD AUTO: 12.4 FL (ref 8.9–12.7)
POTASSIUM SERPL-SCNC: 4.1 MMOL/L (ref 3.5–5.3)
RBC # BLD AUTO: 2.41 MILLION/UL (ref 3.81–5.12)
RH BLD: POSITIVE
SODIUM SERPL-SCNC: 135 MMOL/L (ref 136–145)
SPECIMEN EXPIRATION DATE: NORMAL
WBC # BLD AUTO: 7.98 THOUSAND/UL (ref 4.31–10.16)

## 2020-01-21 PROCEDURE — 87799 DETECT AGENT NOS DNA QUANT: CPT | Performed by: INTERNAL MEDICINE

## 2020-01-21 PROCEDURE — 85027 COMPLETE CBC AUTOMATED: CPT | Performed by: INTERNAL MEDICINE

## 2020-01-21 PROCEDURE — 80048 BASIC METABOLIC PNL TOTAL CA: CPT | Performed by: INTERNAL MEDICINE

## 2020-01-21 PROCEDURE — 99232 SBSQ HOSP IP/OBS MODERATE 35: CPT | Performed by: INTERNAL MEDICINE

## 2020-01-21 PROCEDURE — 82948 REAGENT STRIP/BLOOD GLUCOSE: CPT

## 2020-01-21 RX ORDER — CEPHALEXIN 500 MG/1
500 CAPSULE ORAL EVERY 8 HOURS SCHEDULED
Status: DISCONTINUED | OUTPATIENT
Start: 2020-01-21 | End: 2020-01-23 | Stop reason: HOSPADM

## 2020-01-21 RX ORDER — SODIUM BICARBONATE 650 MG/1
1300 TABLET ORAL 4 TIMES DAILY
Status: DISCONTINUED | OUTPATIENT
Start: 2020-01-21 | End: 2020-01-22

## 2020-01-21 RX ADMIN — LEVOTHYROXINE SODIUM 125 MCG: 125 TABLET ORAL at 05:11

## 2020-01-21 RX ADMIN — DOXAZOSIN 2 MG: 1 TABLET ORAL at 21:52

## 2020-01-21 RX ADMIN — BUSPIRONE HYDROCHLORIDE 5 MG: 5 TABLET ORAL at 09:07

## 2020-01-21 RX ADMIN — BUSPIRONE HYDROCHLORIDE 5 MG: 5 TABLET ORAL at 17:36

## 2020-01-21 RX ADMIN — TACROLIMUS 2 MG: 1 CAPSULE ORAL at 09:07

## 2020-01-21 RX ADMIN — DULOXETINE HYDROCHLORIDE 60 MG: 60 CAPSULE, DELAYED RELEASE ORAL at 17:36

## 2020-01-21 RX ADMIN — TACROLIMUS 2 MG: 1 CAPSULE ORAL at 21:53

## 2020-01-21 RX ADMIN — HYDRALAZINE HYDROCHLORIDE 75 MG: 25 TABLET ORAL at 21:52

## 2020-01-21 RX ADMIN — AMLODIPINE BESYLATE 10 MG: 10 TABLET ORAL at 09:07

## 2020-01-21 RX ADMIN — ARIPIPRAZOLE 30 MG: 10 TABLET ORAL at 21:54

## 2020-01-21 RX ADMIN — CLONIDINE HYDROCHLORIDE 0.2 MG: 0.1 TABLET ORAL at 14:23

## 2020-01-21 RX ADMIN — CLONIDINE HYDROCHLORIDE 0.2 MG: 0.1 TABLET ORAL at 05:11

## 2020-01-21 RX ADMIN — ROPINIROLE HYDROCHLORIDE 0.25 MG: 0.25 TABLET, FILM COATED ORAL at 17:36

## 2020-01-21 RX ADMIN — SODIUM BICARBONATE 650 MG TABLET 1300 MG: at 21:53

## 2020-01-21 RX ADMIN — CLONIDINE HYDROCHLORIDE 0.2 MG: 0.1 TABLET ORAL at 21:57

## 2020-01-21 RX ADMIN — DOCUSATE SODIUM 100 MG: 100 CAPSULE, LIQUID FILLED ORAL at 09:06

## 2020-01-21 RX ADMIN — ROPINIROLE HYDROCHLORIDE 0.25 MG: 0.25 TABLET, FILM COATED ORAL at 09:07

## 2020-01-21 RX ADMIN — SEVELAMER HYDROCHLORIDE 1600 MG: 800 TABLET, FILM COATED PARENTERAL at 09:07

## 2020-01-21 RX ADMIN — DULOXETINE HYDROCHLORIDE 60 MG: 60 CAPSULE, DELAYED RELEASE ORAL at 09:06

## 2020-01-21 RX ADMIN — CEPHALEXIN 500 MG: 500 CAPSULE ORAL at 14:23

## 2020-01-21 RX ADMIN — SEVELAMER HYDROCHLORIDE 1600 MG: 800 TABLET, FILM COATED PARENTERAL at 11:55

## 2020-01-21 RX ADMIN — FERROUS SULFATE TAB 325 MG (65 MG ELEMENTAL FE) 325 MG: 325 (65 FE) TAB at 09:06

## 2020-01-21 RX ADMIN — CEPHALEXIN 500 MG: 500 CAPSULE ORAL at 21:52

## 2020-01-21 RX ADMIN — FOLIC ACID 1000 MCG: 1 TABLET ORAL at 09:06

## 2020-01-21 RX ADMIN — SENNOSIDES 8.6 MG: 8.6 TABLET, FILM COATED ORAL at 09:07

## 2020-01-21 RX ADMIN — ACETAMINOPHEN 650 MG: 325 TABLET, FILM COATED ORAL at 03:58

## 2020-01-21 RX ADMIN — DOCUSATE SODIUM 100 MG: 100 CAPSULE, LIQUID FILLED ORAL at 17:36

## 2020-01-21 RX ADMIN — SODIUM BICARBONATE 650 MG TABLET 1300 MG: at 17:35

## 2020-01-21 RX ADMIN — POLYETHYLENE GLYCOL 3350 17 G: 17 POWDER, FOR SOLUTION ORAL at 09:06

## 2020-01-21 RX ADMIN — HYDRALAZINE HYDROCHLORIDE 75 MG: 25 TABLET ORAL at 09:06

## 2020-01-21 RX ADMIN — SEVELAMER HYDROCHLORIDE 1600 MG: 800 TABLET, FILM COATED PARENTERAL at 17:36

## 2020-01-21 RX ADMIN — SERTRALINE HYDROCHLORIDE 200 MG: 100 TABLET ORAL at 09:07

## 2020-01-21 RX ADMIN — METOPROLOL TARTRATE 50 MG: 50 TABLET, FILM COATED ORAL at 09:06

## 2020-01-21 RX ADMIN — METOPROLOL TARTRATE 50 MG: 50 TABLET, FILM COATED ORAL at 21:53

## 2020-01-21 RX ADMIN — INSULIN GLARGINE 4 UNITS: 100 INJECTION, SOLUTION SUBCUTANEOUS at 21:53

## 2020-01-21 RX ADMIN — PREDNISONE 5 MG: 5 TABLET ORAL at 09:07

## 2020-01-21 RX ADMIN — TORSEMIDE 20 MG: 20 TABLET ORAL at 09:06

## 2020-01-21 RX ADMIN — ASPIRIN 81 MG 81 MG: 81 TABLET ORAL at 09:06

## 2020-01-21 RX ADMIN — SODIUM BICARBONATE 650 MG TABLET 1300 MG: at 09:08

## 2020-01-21 RX ADMIN — HYDRALAZINE HYDROCHLORIDE 75 MG: 25 TABLET ORAL at 17:35

## 2020-01-21 RX ADMIN — PRAVASTATIN SODIUM 80 MG: 80 TABLET ORAL at 17:35

## 2020-01-21 RX ADMIN — MELATONIN 3000 UNITS: at 09:06

## 2020-01-21 NOTE — ASSESSMENT & PLAN NOTE
Wt Readings from Last 3 Encounters:   01/21/20 99 7 kg (219 lb 11 2 oz)   01/17/20 99 3 kg (219 lb)   01/08/20 100 kg (221 lb)   · Echocardiogram most recently with normal ejection fraction, grade 1 diastolic dysfunction with no wall motion abnormalities, moderate aortic regurgitation and trace pericardial effusion  · Last admission patient found to be in acute on chronic diastolic heart failure and was diuresed by Nephrology and received IV albumin and switch to p o   Torsemide  · Renal resumed torsemide 1/18

## 2020-01-21 NOTE — ASSESSMENT & PLAN NOTE
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 and follows Dr Geni Thakkar  She is status post kidney and pancreas transplantation 1998 on immuno suppression therapy with prednisone 5 mg daily, tacrolimus 2 mg b i d   · Presented to hospital at the request of her outpatient nephrologist due to worsening creatinine and anemia    Creatinine has been gradually improving during hospitalization  Creatinine level 4 08 today  [facility] Ultrasound did not show any hydronephrosis or masses of patient's transplanted kidney  Both renal artery and renal vein were patent  Nephrology notes reviewed  Viral testing ordered including EBV, CMV and BK virus  Continue to monitor renal functions closely  Nephrology input appreciated

## 2020-01-21 NOTE — PROGRESS NOTES
NEPHROLOGY PROGRESS NOTE   Ether Expose 53 y o  female MRN: [id number]  Unit/Bed#: S -01 Encounter: [id number]    ASSESSMENT & PLAN:  53-year-old female with known history of chronic kidney disease stage 4 with baseline creatinine 2 5-3 0, admitted for elevated creatinine  · Acute kidney injury, POA  · Admission creatinine was 4 42 on 1/17  · Acute kidney injury likely prerenal secondary to severe anemia on admission and possibly mild volume depletion from diuretics  Admission hemoglobin was 6 5 and dropped to 5 8 on 01/18  Also has UTI which could be contributing  Patient was also recently restarted in January on outpatient Revlimid for Multiple myeloma by Hematology Oncology which could also contribute to elevated creatinine  · Continue to monitor renal function  Avoid nephrotoxins  · Bladder scans were negative  · Renal ultrasound did not show any hydronephrosis in the transplanted kidney  · UA with urine microscopy showed 2+ protein, numerous WBC and RBC  · Renal function today improving to creatinine 4 08 from 4 3 yesterday  · Avoid hypotension  · Follow-up viral panels-CMV, EBV and BK virus testing  · Chronic kidney disease stage 4:  Baseline creatinine 2 5-3 0 recently,  Follows with Dr Tamir Aguilar  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  · Status post kidney pancreas transplant in 1998, on immunosuppressive medication:  Prednisone 5 mg daily, tacrolimus 2 mg every 12 hours  Last Prograf level was 4 9 on 01/08  · Anemia:    · Status post 2 units PRBC  Admission hemoglobin was 6 5 and trended down to 5 8 on 01/18  · Continue monitor hemoglobin per primary team    · Anemia likely multifactorial:  Multiple myeloma, chemotherapy, CKD    Iron stores showed iron saturation 30%  · On oral iron tablets   · May consider PRBC if hemoglobin drops below 7 0  · Hyperphosphatemia  · Phosphorus level was 5 3 on 01/18  · Continue phosphorus binders-sevelamer  · Primary hypertension with chronic kidney disease:  · BP elevated today am but has been fluctuating  · Continue current medication  Continue torsemide at current dose  · If persistently elevated would recommend increasing the dose of hydralazine to 100 mg t i d   · Continue holding aldactone with recent worsening of renal function and risk of hyperkalemia   · Metabolic acidosis:  Bicarbonate level low at 18  Continue sodium bicarbonate, increased dose today  · UTI:  Currently on ceftriaxone  Continue antibiotic per ID  · Multiple myeloma:  Management per Hematology Oncology    Discussed with Dr Marlys Payton  SUBJECTIVE:  No SOB  No Chest pain  No nausea or vomiting    OBJECTIVE:  Current Weight: Weight - Scale: 99 kg (218 lb 4 1 oz)  Vitals:    01/21/20 0700   BP: (!) 172/64   Pulse: 56   Resp: 18   Temp: 97 9 °F (36 6 °C)   SpO2: 97%       Intake/Output Summary (Last 24 hours) at 1/21/2020 5312  Last data filed at 1/21/2020 0400  Gross per 24 hour   Intake 300 ml   Output 1875 ml   Net -1575 ml       Physical Exam  General:  Ill looking, awake  Eyes: Conjunctivae pink,  Sclera anicteric  ENT: lips and mucous membranes moist  Neck: supple   Chest: Clear to Auscultation both lungs,  no crackles, ronchus or wheezing  CVS: S1 & S2 present, normal rate, regular rhythm, no murmur  Abdomen: soft, non-tender, non-distended, Bowel sounds normoactive  Extremities:  Trace edema both legs    Skin: no rash  Neuro: awake, alert, oriented x3      Medications:    Current Facility-Administered Medications:     acetaminophen (TYLENOL) tablet 650 mg, 650 mg, Oral, Q6H PRN, Praful Aden PA-C, 650 mg at 01/21/20 0358    amLODIPine (NORVASC) tablet 10 mg, 10 mg, Oral, Daily, Praful Aden PA-C, 10 mg at 01/21/20 0907    ARIPiprazole (ABILIFY) tablet 30 mg, 30 mg, Oral, HS, Praful Aden PA-C, 30 mg at 01/20/20 2100    aspirin chewable tablet 81 mg, 81 mg, Oral, Daily, Praful Aden JOHN, 81 mg at 01/21/20 0906    busPIRone (BUSPAR) tablet 5 mg, 5 mg, Oral, BID, Praful Aden PA-C, 5 mg at 01/21/20 0907    cefTRIAXone (ROCEPHIN) 1,000 mg in dextrose 5 % 50 mL IVPB, 1,000 mg, Intravenous, Q24H, Leonard Alba MD, Last Rate: 100 mL/hr at 01/20/20 1459, 1,000 mg at 01/20/20 1459    cholecalciferol (VITAMIN D3) tablet 3,000 Units, 3,000 Units, Oral, Daily, Praful Aden PA-C, 3,000 Units at 01/21/20 0906    cloNIDine (CATAPRES) tablet 0 2 mg, 0 2 mg, Oral, Q8H [address], Praful Aden PA-C, 0 2 mg at 01/21/20 0511    diphenhydrAMINE (BENADRYL) injection 25 mg, 25 mg, Intravenous, Q6H PRN, Cindy Aden PA-C, Stopped at 01/18/20 1700    docusate sodium (COLACE) capsule 100 mg, 100 mg, Oral, BID, Praful Aden PA-C, 100 mg at 01/21/20 0906    doxazosin (CARDURA) tablet 2 mg, 2 mg, Oral, HS, Praful Aden PA-C, 2 mg at 01/20/20 2100    DULoxetine (CYMBALTA) delayed release capsule 60 mg, 60 mg, Oral, BID, Praful Aden PA-C, 60 mg at 01/21/20 0906    ferrous sulfate tablet 325 mg, 325 mg, Oral, Daily With Breakfast, Praful Aden PA-C, 325 mg at 96/09/52 7530    folic acid (FOLVITE) tablet 1,000 mcg, 1,000 mcg, Oral, Daily, Praful Aden PA-C, 1,000 mcg at 01/21/20 0906    hydrALAZINE (APRESOLINE) tablet 75 mg, 75 mg, Oral, TID, Karen Euceda MD, 75 mg at 01/21/20 0906    insulin glargine (LANTUS) subcutaneous injection 4 Units 0 04 mL, 4 Units, Subcutaneous, HS, Kraig Rhodes MD, 4 Units at 01/20/20 2100    insulin lispro (HumaLOG) 100 units/mL subcutaneous injection 1-5 Units, 1-5 Units, Subcutaneous, HS, Praful Aden PA-C    insulin lispro (HumaLOG) 100 units/mL subcutaneous injection 1-5 Units, 1-5 Units, Subcutaneous, TID AC, 1 Units at 01/18/20 1132 **AND** Fingerstick Glucose (POCT), , , TID AC, Praful Aden PA-C    levothyroxine tablet 125 mcg, 125 mcg, Oral, Daily, Praful Aden PA-C, 125 mcg at 01/21/20 0511    LORazepam (ATIVAN) tablet 2 mg, 2 mg, Oral, TID PRN, Matt Aden PA-C    metoprolol tartrate (LOPRESSOR) tablet 50 mg, 50 mg, Oral, Q12H [facility], Alvarado Solitario MD, 50 mg at 01/21/20 0906    polyethylene glycol (MIRALAX) packet 17 g, 17 g, Oral, Daily, Praful Aden PA-C, 17 g at 01/21/20 0906    pravastatin (PRAVACHOL) tablet 80 mg, 80 mg, Oral, Daily With Tony Aden PA-C, 80 mg at 01/20/20 1803    predniSONE tablet 5 mg, 5 mg, Oral, Daily, Praful Aden PA-C, 5 mg at 01/21/20 0907    rOPINIRole (REQUIP) tablet 0 25 mg, 0 25 mg, Oral, BID, Praful Aden PA-C, 0 25 mg at 01/21/20 0907    senna (SENOKOT) tablet 8 6 mg, 1 tablet, Oral, Daily, Praful Aden PA-C, 8 6 mg at 01/21/20 0907    sertraline (ZOLOFT) tablet 200 mg, 200 mg, Oral, Daily, Praful Aden PA-C, 200 mg at 01/21/20 0907    sevelamer (RENAGEL) tablet 1,600 mg, 1,600 mg, Oral, TID With Meals, Matt Aden PA-C, 1,600 mg at 01/21/20 0907    sodium bicarbonate tablet 1,300 mg, 1,300 mg, Oral, TID, Praful Aden PA-C, 1,300 mg at 01/21/20 0908    tacrolimus (PROGRAF) capsule 2 mg, 2 mg, Oral, Q12H [facility], Praful Aden PA-C, 2 mg at 01/21/20 0907    torsemide (DEMADEX) tablet 20 mg, 20 mg, Oral, Daily, Alvarado Solitario MD, 20 mg at 01/21/20 0906    Invasive Devices:        Lab Results:   Results from last 7 days   Lab Units 01/21/20  0440 01/20/20  0628 01/19/20  0514  01/18/20  0439  01/17/20  0758   WBC Thousand/uL 7 98 8 53 8 55  --  7 63   < > 8 00   HEMOGLOBIN g/dL 7 7* 7 9* 8 3*   < > 5 8*   < > 6 5*   HEMATOCRIT % 24 5* 25 0* 26 2*   < > 19 2*   < > 21 1*   PLATELETS Thousands/uL 160 154 154  --  151   < > 163   POTASSIUM mmol/L 4 1 4 4 4 7  --  4 8   < > 4 9   CHLORIDE mmol/L 105 107 106  --  105   < > 112*   CO2 mmol/L 18* 18* 16*  --  19*   < > 18*   BUN mg/dL 64* 65* 62*  --  57*   < > 49*   CREATININE mg/dL 4 08* 4 37* 4 48*  --  4 44*   < > 4 19*   CALCIUM mg/dL 7 9* 7 9* 8 3  --  8 2*   < > 8 4   MAGNESIUM mg/dL  --   --   --   --  2 7*  --  2 9* PHOSPHORUS mg/dL  --   --   --   --  5 3*  --  4 6*   ALK PHOS U/L  --   --   --   --   --   --  95   ALT U/L  --   --   --   --   --   --  14   AST U/L  --   --   --   --   --   --  7    < > = values in this interval not displayed  Previous work up:  RENAL ULTRASOUND     INDICATION:   AK I      COMPARISON: 11/5/2019     TECHNIQUE:   Ultrasound of the retroperitoneum was performed with a curvilinear transducer utilizing volumetric sweeps and still imaging techniques       FINDINGS:     NATIVE KIDNEYS:  There is severe bilateral renal atrophy with measurements below  Right kidney:  6 4 x 3 2 cm  Left kidney: cm      Right kidney  The renal parenchyma is diffusely echogenic consistent with medical renal disease  No suspicious masses detected  1 3 x 1 2 x 1 2 cm simple cyst in the lower pole is present  No hydronephrosis  No shadowing calculi  No perinephric fluid collections      Left kidney  No definitive renal parenchyma seen within the left renal fossa  No suspicious masses identified      Transplant kidney:  Transplant kidney is again seen within the left lower quadrant  Transplant kidney measures 12 0 x 5 9 cm  There is normal cortical echogenicity and cortical medullary differentiation  1 5 x 1 1 x 1 0 cm simple cyst is not significantly changed  No   suspicious masses identified  The renal vein and artery are patent  Resistive indices in the upper pole are elevated measuring up to 0 9  No hydronephrosis or perinephric fluid collection      URETERS:  Nonvisualized      BLADDER:   Underdistended and therefore suboptimally evaluated      IMPRESSION:     1  Transplant kidney without hydronephrosis or suspicious mass  Resistive indices are slightly elevated within the upper pole; however, renal vein and artery are patent      2   Marked atrophy of native kidneys with no definitive renal parenchyma seen within the left renal fossa  No hydronephrosis or suspicious masses        Portions of the record may have been created with voice recognition software  Occasional wrong word or "sound a like" substitutions may have occurred due to the inherent limitations of voice recognition software  Read the chart carefully and recognize, using context, where substitutions have occurred  If you have any questions, please contact the dictating provider

## 2020-01-21 NOTE — PROGRESS NOTES
Progress Note - Infectious Disease   Jeanine Domingo 53 y o  female MRN: [id number]  Unit/Bed#: S -01 Encounter: [id number]      Impression/Plan:  1  Symptomatic UTI  Stew Santos has history of bladder colonization with MDR pathogens, most recently VRE   Her UA is grossly abnormal and she is now growing E  Coli sensitive to cefazolin  Allison Francisco Javier is persistent at end of urine stream yet  RN reports PVR last night was 61 mL   0 mL PVRs also charted in computer on 01/18 and 1/21/20  Discontinue ceftriaxone  Transition to Keflex 500 mg p o  Every 8 hours to complete a 7 day course total through 01/26/2020  Monitor urinary symptoms  Monitor temperature/WBC      2  EDUARDO, superimposed on CKD   Etiology not entirely clear   Possible prerenal state from diuresis and decreased p o  fluid intake   Creatinine has slightly improved  Antibiotic dosages adjusted accordingly to q 8 hour dosing  Monitor creatinine  Nephrology following closely     3  Status post renal transplant   No evidence of pyelonephritis of transplanted kidney      4  Multiple myeloma   Patient had been on Revlimid previously but on hold due to multiple admissions for acute illnesses recently      Discussed with patient in detail regarding the above plan      Antibiotics:  Cephalexin #2     Subjective:  Patient has dysuria at the end of urination and strong odor yet  She states she is frustrated  She refuses to self catheterize herself and does not believe she is retaining urine  Her PVRs have been reported to be low here the last few days  No abdominal or flank pain  No LLQT over transplant kidney site  Temperature stays down   No chills  She is tolerating antibiotic well   No nausea, vomiting or diarrhea  She just wants to get home, she states she has not been home since Thanksgiving      Objective:  Vitals:  Temp:  [97 7 °F (36 5 °C)-98 1 °F (36 7 °C)] 97 9 °F (36 6 °C)  HR:  [56-63] 56  Resp:  [18] 18  BP: (136-188)/(58-81) 172/64  SpO2:  [96 %-97 %] 97 %  Temp (24hrs), Av 9 °F (36 6 °C), Min:97 7 °F (36 5 °C), Max:98 1 °F (36 7 °C)  Current: Temperature: 97 9 °F (36 6 °C)    General Appearance:  Awake, alert, cooperative, resting in recliner, no acute distress  Throat: Oropharynx moist without lesions  Lungs:   Clear to auscultation bilaterally; no wheezes, rhonchi or rales; respirations unlabored   Heart:  RRR; S1-S2 heard, no murmur   Abdomen:   Soft, non-tender, non-distended, positive bowel sounds  Extremities: No edema, arm IV site nontender   : No Moreno, no suprapubic tenderness, no left lower quadrant tenderness over the transplant kidney site  Skin: No rashes      Labs, Imaging, & Other studies:   All pertinent labs and imaging studies were personally reviewed  Results from last 7 days   Lab Units 20  0440 20  0628 20  0514   WBC Thousand/uL 7 98 8 53 8 55   HEMOGLOBIN g/dL 7 7* 7 9* 8 3*   PLATELETS Thousands/uL 160 154 154     Results from last 7 days   Lab Units 20  0440  20  0758   POTASSIUM mmol/L 4 1   < > 4 9   CHLORIDE mmol/L 105   < > 112*   CO2 mmol/L 18*   < > 18*   BUN mg/dL 64*   < > 49*   CREATININE mg/dL 4 08*   < > 4 19*   EGFR ml/min/1 73sq m 12   < > 11   CALCIUM mg/dL 7 9*   < > 8 4   AST U/L  --   --  7   ALT U/L  --   --  14   ALK PHOS U/L  --   --  95    < > = values in this interval not displayed           Results from last 7 days   Lab Units 20  2019   URINE CULTURE  50,000-59,000 cfu/ml Escherichia coli*  10,000-19,000 cfu/ml

## 2020-01-21 NOTE — ASSESSMENT & PLAN NOTE
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

## 2020-01-21 NOTE — SOCIAL WORK
LOS 2  GLOS 3 1  Pt is a bundle for UTI  Bundle notice reviewed and provided to pt  Pt is a 30 day readmission  Pt was recently discharged at [facility] after a long hospitalization  Pt was in for Enterocolitis, acute on chronic CHF  Pt has extensive past medical hx  Pt readmitted for worsening renal function  Pt was sent by her PCP to come to the hospital     Met with pt who provided information for initial assessment  Pt lives alone in a 1st floor apt with 2STE  Prior to admission, pt ambulated with a cane and performs ADLs herself  Pt went to outpt PT 1x after she was discharged from [facility]  She had an appt today, however, had to cancel it because she was rehospitalized  PCP- Dr Harjit Carranza  Preferred pharmacy- [facility] on [address]  Pt stated she drives  Pt expressed she hopes to not be in the hospital for a long time like last admission  Pt expressed she would like to return home and resume outpt PT  CM reviewed discharge planning process including the following: identifying caregivers at home, preference for d/c planning needs, availability of [facility] to [facility] program, availability of treatment team to discuss questions or concerns patient and/or family may have regarding diagnosis, plan of care, old or new medications and discharge planning   CM will continue to follow for care coordination and update assessment as appropriate

## 2020-01-21 NOTE — PROGRESS NOTES
Progress Note - Lance Gill 1965, 53 y o  female MRN: [id number]    Unit/Bed#: S -01 Encounter: [id number]    Primary Care Provider: Sydney Mendoza MD   Date and time admitted to hospital: 1/17/2020  2:09 PM        * Anemia  Assessment & Plan  · Patient admitted with hemoglobin 6 5  Baseline hemoglobin mid 7 range with known his anemia of chronic disease and multiple myeloma  · Most likely due to chronic kidney disease  ·   · Patient has warm auto antibody but tolerated 2 units of packed red blood cells on 01/18  Hemoglobin malia from 5 8 up to 8 3 yesterday  Today it is 7 7  Continue to monitor daily CBC  Transfuse for hemoglobin less than 7  Acute renal failure superimposed on stage 4 chronic kidney disease ([facility] [address] )  Assessment & Plan  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 and follows Dr Paresh Strong  She is status post kidney and pancreas transplantation 1998 on immuno suppression therapy with prednisone 5 mg daily, tacrolimus 2 mg b i d   · Presented to hospital at the request of her outpatient nephrologist due to worsening creatinine and anemia    Creatinine has been gradually improving during hospitalization  Creatinine level 4 08 today  [facility] Ultrasound did not show any hydronephrosis or masses of patient's transplanted kidney  Both renal artery and renal vein were patent  Nephrology notes reviewed  Viral testing ordered including EBV, CMV and BK virus  Continue to monitor renal functions closely  Nephrology input appreciated      Asymptomatic bacteriuria  Assessment & Plan  · Urine culture positive for gram negative rods, ID following   · Known chronic bacteriuria with intermittent dysuria     Patient did start having some UTI symptoms  Daptomycin was switched to Rocephin  Id following  Patient was recommended self catheterization for possible episodes of urinary retention  However refuses to self-catheterize  Refusing inpatient urology evaluation and wants to follow-up with outpatient urologist    Multiple myeloma not having achieved remission [facility])  Assessment & Plan  · Follows with Dr Bernard Davenport for known history of IgG kappa multiple myeloma stage III diagnosed in April 2019 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  · Currently off Revlimid due to frequent readmissions to the hospital for volume overload, constipation/fecal impaction/colitis, urinary tract infection  · Recommended outpatient Oncology follow-up on discharge    Anxiety  Assessment & Plan  · Continue Cymbalta, Abilify, BuSpar, Requip, Zoloft, prn ativan     Atrial fibrillation (HCC)  Assessment & Plan  · Continue lopressor     Chronic constipation  Assessment & Plan  · Evaluated by Gastroenterology last admission for abdominal pain, constipation and fecal stasis  · Reports frequent diarrhea at home while on bowel regimen but now constipated  · Monitor stool output and treat diarrhea versus constipation accordingly  · Outpatient colonoscopy recommended    Chronic diastolic congestive heart failure ([facility] [address] )  Assessment & Plan  Wt Readings from Last 3 Encounters:   01/21/20 99 7 kg (219 lb 11 2 oz)   01/17/20 99 3 kg (219 lb)   01/08/20 100 kg (221 lb)   · Echocardiogram most recently with normal ejection fraction, grade 1 diastolic dysfunction with no wall motion abnormalities, moderate aortic regurgitation and trace pericardial effusion  · Last admission patient found to be in acute on chronic diastolic heart failure and was diuresed by Nephrology and received IV albumin and switch to p o   Torsemide  · Renal resumed torsemide 1/18    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 ([facility] [address] )  Assessment & Plan  Lab Results   Component Value Date    HGBA1C 4 9 2019     Recent Labs     20  1625 20  2102 20  0814 20  1116   POCGLU 134 101 101 132     Blood Sugar Average: Last 72 hrs:  · (P) 119 [id number]   · Continue Lantus 5 units q h s , resume Toujeo at d/c  · SSI, accuchecks, ADA     Benign hypertension with CKD (chronic kidney disease) stage IV (HCC)  Assessment & Plan  · Home regimen includes clonidine 0 2 mg t i d , hydralazine 50 mg Q 8, Cardura 20 mg at bedtime, Lopressor 50 mg twice daily, Norvasc 10 mg daily with hold parameters  · Hold Aldactone 25 mg daily   · Torsemide 20 mg resumed   · Avoid hypotension    Acquired hypothyroidism  Assessment & Plan  · Continue Synthroid 125mcg qd        VTE Pharmacologic Prophylaxis:   Pharmacologic: Heparin  Mechanical VTE Prophylaxis in Place: No    Patient Centered Rounds: I have performed bedside rounds with nursing staff today  Discussions with Specialists or Other Care Team Provider:  Discussed with nephrology  Education and Discussions with Family / Patient:  Discussed with patient    Time Spent for Care: 20 minutes  More than 50% of total time spent on counseling and coordination of care as described above  Current Length of Stay: 4 day(s)    Current Patient Status: Inpatient   Certification Statement: The patient will continue to require additional inpatient hospital stay due to Ongoing close monitoring of renal functions  Discharge Plan:  Currently not medically stable for discharge  Code Status: Level 1 - Full Code      Subjective:   Patient denies any nausea vomiting abdominal discomfort  Still continues to complain of burning while passing urine  Denies any urinary urgency or frequency      Objective:     Vitals:   Temp (24hrs), Av 9 °F (36 6 °C), Min:97 7 °F (36 5 °C), Max:98 1 °F (36 7 °C)    Temp:  [97 7 °F (36 5 °C)-98 1 °F (36 7 °C)] 97 9 °F (36 6 °C)  HR:  [56-63] 56  Resp:  [18] 18  BP: (136-188)/(58-81) 172/64  SpO2:  [96 %-97 %] 97 %  Body mass index is 34 41 kg/m²  Input and Output Summary (last 24 hours): Intake/Output Summary (Last 24 hours) at 1/21/2020 1402  Last data filed at 1/21/2020 0400  Gross per 24 hour   Intake    Output 1125 ml   Net -1125 ml       Physical Exam:     Physical Exam   Constitutional: She is oriented to person, place, and time  No distress  HENT:   Head: Normocephalic and atraumatic  Mouth/Throat: Oropharynx is clear and moist    Eyes: Pupils are equal, round, and reactive to light  EOM are normal    Neck: Normal range of motion  Neck supple  Cardiovascular: Normal rate and regular rhythm  Pulmonary/Chest: Effort normal and breath sounds normal    Abdominal: Soft  Bowel sounds are normal    Musculoskeletal: She exhibits no edema  Neurological: She is alert and oriented to person, place, and time  Skin: Skin is warm and dry  She is not diaphoretic  There is pallor  Psychiatric: She has a normal mood and affect  (   Additional Data:     Labs:    Results from last 7 days   Lab Units 01/21/20  0440 01/20/20  0628   WBC Thousand/uL 7 98 8 53   HEMOGLOBIN g/dL 7 7* 7 9*   HEMATOCRIT % 24 5* 25 0*   PLATELETS Thousands/uL 160 154   NEUTROS PCT %  --  75   LYMPHS PCT %  --  11*   MONOS PCT %  --  8   EOS PCT %  --  4     Results from last 7 days   Lab Units 01/21/20  0440  01/17/20  0758   SODIUM mmol/L 135*   < > 140   POTASSIUM mmol/L 4 1   < > 4 9   CHLORIDE mmol/L 105   < > 112*   CO2 mmol/L 18*   < > 18*   BUN mg/dL 64*   < > 49*   CREATININE mg/dL 4 08*   < > 4 19*   ANION GAP mmol/L 12   < > 10   CALCIUM mg/dL 7 9*   < > 8 4   ALBUMIN g/dL  --   --  3 1*   TOTAL BILIRUBIN mg/dL  --   --  0 53   ALK PHOS U/L  --   --  95   ALT U/L  --   --  14   AST U/L  --   --  7   GLUCOSE RANDOM mg/dL 101   < >  --     < > = values in this interval not displayed       Results from last 7 days   Lab Units 01/17/20  1516   INR  1 09     Results from last 7 days   Lab Units 01/21/20  1116 01/21/20  0814 01/20/20  2102 01/20/20  1625 01/20/20  1042 01/20/20  0738 01/19/20  2120 01/19/20  1623 01/19/20  1112 01/19/20  0725 01/18/20  2105 01/18/20  1551   POC GLUCOSE mg/dl 132 101 101 134 131 106 107 122 119 110 109 117                   * I Have Reviewed All Lab Data Listed Above  * Additional Pertinent Lab Tests Reviewed:  [address] Admission Reviewed    Imaging:    Imaging Reports Reviewed Today Include: NA      Recent Cultures (last 7 days):     Results from last 7 days   Lab Units 01/17/20  2019   URINE CULTURE  50,000-59,000 cfu/ml Escherichia coli*  10,000-19,000 cfu/ml        Last 24 Hours Medication List:     Current Facility-Administered Medications:  acetaminophen 650 mg Oral Q6H PRN Praful Aden PA-C   amLODIPine 10 mg Oral Daily Praful Aden PA-C   ARIPiprazole 30 mg Oral HS Praful Aden PA-C   aspirin 81 mg Oral Daily Praful Aden PA-C   busPIRone 5 mg Oral BID Praful Aden PA-C   cephalexin 500 mg Oral Q8H [facility] An Donnelly MD   cholecalciferol 3,000 Units Oral Daily Praful Aden PA-C   cloNIDine 0 2 mg Oral Q8H [facility] Praful Aden PA-C   diphenhydrAMINE 25 mg Intravenous Q6H PRN Praful Aden PA-C   docusate sodium 100 mg Oral BID Praful Aden PA-C   doxazosin 2 mg Oral HS Praful Aden PA-C   DULoxetine 60 mg Oral BID Praful Aden PA-C   ferrous sulfate 325 mg Oral Daily With Breakfast Praful Aden PA-C   folic acid 3,190 mcg Oral Daily Praful Aden PA-C   hydrALAZINE 75 mg Oral TID Olivier Young MD   insulin glargine 4 Units Subcutaneous HS Yvette Lima MD   insulin lispro 1-5 Units Subcutaneous HS Praful Aden PA-C   insulin lispro 1-5 Units Subcutaneous TID AC Praful Aden PA-C   levothyroxine 125 mcg Oral Daily Praful Aden PA-C   LORazepam 2 mg Oral TID PRN Sangeeta Aden PA-C   metoprolol tartrate 50 mg Oral Q12H [facility] Olivier Young MD   polyethylene glycol 17 g Oral Daily Praful VASQUEZ JOHN Aden   pravastatin 80 mg Oral Daily With [employer] JOHN Aden   predniSONE 5 mg Oral Daily Praful Aden PA-C   rOPINIRole 0 25 mg Oral BID Praful Aden PA-C   senna 1 tablet Oral Daily Praful Aden PA-C   sertraline 200 mg Oral Daily Praful Aden PA-C   sevelamer 1,600 mg Oral TID With Meals Praful Aden PA-C   sodium bicarbonate 1,300 mg Oral 4x Daily Vilma Lam MD   tacrolimus 2 mg Oral Q12H [address] Praful Aden PA-C   torsemide 20 mg Oral Daily Katiana Bain MD        Today, Patient Was Seen By: Jasmin Braun MD    ** Please Note: Dictation voice to text software may have been used in the creation of this document   **

## 2020-01-21 NOTE — ASSESSMENT & PLAN NOTE
· Patient admitted with hemoglobin 6 5  Baseline hemoglobin mid 7 range with known his anemia of chronic disease and multiple myeloma  · Most likely due to chronic kidney disease  ·   · Patient has warm auto antibody but tolerated 2 units of packed red blood cells on 01/18  Hemoglobin malia from 5 8 up to 8 3 yesterday  Today it is 7 7  Continue to monitor daily CBC  Transfuse for hemoglobin less than 7

## 2020-01-21 NOTE — ASSESSMENT & PLAN NOTE
· Urine culture positive for gram negative rods, ID following   · Known chronic bacteriuria with intermittent dysuria     Patient did start having some UTI symptoms  Daptomycin was switched to Rocephin  Id following  Patient was recommended self catheterization for possible episodes of urinary retention  However refuses to self-catheterize    Refusing inpatient urology evaluation and wants to follow-up with outpatient urologist

## 2020-01-21 NOTE — ASSESSMENT & PLAN NOTE
Lab Results   Component Value Date    HGBA1C 4 9 12/28/2019     Recent Labs     01/20/20  1625 01/20/20  2102 01/21/20  0814 01/21/20  1116   POCGLU 134 101 101 132     Blood Sugar Average: Last 72 hrs:  · (P) 119 [id number]   · Continue Lantus 5 units q h s , resume Toujeo at d/c  · SSI, accuchecks, ADA

## 2020-01-21 NOTE — PLAN OF CARE
Problem: Potential for Falls  Goal: Patient will remain free of falls  Description  INTERVENTIONS:  - Assess patient frequently for physical needs  -  Identify cognitive and physical deficits and behaviors that affect risk of falls    -  [place] fall precautions as indicated by assessment   - Educate patient/family on patient safety including physical limitations  - Instruct patient to call for assistance with activity based on assessment  - Modify environment to reduce risk of injury  - Consider OT/PT consult to assist with strengthening/mobility  Outcome: Progressing     Problem: PAIN - ADULT  Goal: Verbalizes/displays adequate comfort level or baseline comfort level  Description  Interventions:  - Encourage patient to monitor pain and request assistance  - Assess pain using appropriate pain scale  - Administer analgesics based on type and severity of pain and evaluate response  - Implement non-pharmacological measures as appropriate and evaluate response  - Consider cultural and social influences on pain and pain management  - Notify physician/advanced practitioner if interventions unsuccessful or patient reports new pain  Outcome: Progressing     Problem: INFECTION - ADULT  Goal: Absence or prevention of progression during hospitalization  Description  INTERVENTIONS:  - Assess and monitor for signs and symptoms of infection  - Monitor lab/diagnostic results  - Monitor all insertion sites, i e  indwelling lines, tubes, and drains  - Monitor endotracheal if appropriate and nasal secretions for changes in amount and color  - [place] appropriate cooling/warming therapies per order  - Administer medications as ordered  - Instruct and encourage patient and family to use good hand hygiene technique  - Identify and instruct in appropriate isolation precautions for identified infection/condition  Outcome: Progressing  Goal: Absence of fever/infection during neutropenic period  Description  INTERVENTIONS:  - Monitor WBC    Outcome: Progressing     Problem: SAFETY ADULT  Goal: Maintain or return to baseline ADL function  Description  INTERVENTIONS:  -  Assess patient's ability to carry out ADLs; assess patient's baseline for ADL function and identify physical deficits which impact ability to perform ADLs (bathing, care of mouth/teeth, toileting, grooming, dressing, etc )  - Assess/evaluate cause of self-care deficits   - Assess range of motion  - Assess patient's mobility; develop plan if impaired  - Assess patient's need for assistive devices and provide as appropriate  - Encourage maximum independence but intervene and supervise when necessary  - Involve family in performance of ADLs  - Assess for home care needs following discharge   - Consider OT consult to assist with ADL evaluation and planning for discharge  - Provide patient education as appropriate  Outcome: Progressing  Goal: Maintain or return mobility status to optimal level  Description  INTERVENTIONS:  - Assess patient's baseline mobility status (ambulation, transfers, stairs, etc )    - Identify cognitive and physical deficits and behaviors that affect mobility  - Identify mobility aids required to assist with transfers and/or ambulation (gait belt, sit-to-stand, lift, walker, cane, etc )  - [place] fall precautions as indicated by assessment  - Record patient progress and toleration of activity level on Mobility SBAR; progress patient to next Phase/Stage  - Instruct patient to call for assistance with activity based on assessment  - Consider rehabilitation consult to assist with strengthening/weightbearing, etc   Outcome: Progressing     Problem: DISCHARGE PLANNING  Goal: Discharge to home or other facility with appropriate resources  Description  INTERVENTIONS:  - Identify barriers to discharge w/patient and caregiver  - Arrange for needed discharge resources and transportation as appropriate  - Identify discharge learning needs (meds, wound care, etc )  - Arrange for interpretive services to assist at discharge as needed  - Refer to Case Management Department for coordinating discharge planning if the patient needs post-hospital services based on physician/advanced practitioner order or complex needs related to functional status, cognitive ability, or social support system  Outcome: Progressing     Problem: Knowledge Deficit  Goal: Patient/family/caregiver demonstrates understanding of disease process, treatment plan, medications, and discharge instructions  Description  Complete learning assessment and assess knowledge base    Interventions:  - Provide teaching at level of understanding  - Provide teaching via preferred learning methods  Outcome: Progressing

## 2020-01-21 NOTE — PLAN OF CARE
Problem: Potential for Falls  Goal: Patient will remain free of falls  Description  INTERVENTIONS:  - Assess patient frequently for physical needs  -  Identify cognitive and physical deficits and behaviors that affect risk of falls    -  [place] fall precautions as indicated by assessment   - Educate patient/family on patient safety including physical limitations  - Instruct patient to call for assistance with activity based on assessment  - Modify environment to reduce risk of injury  - Consider OT/PT consult to assist with strengthening/mobility  Outcome: Progressing     Problem: PAIN - ADULT  Goal: Verbalizes/displays adequate comfort level or baseline comfort level  Description  Interventions:  - Encourage patient to monitor pain and request assistance  - Assess pain using appropriate pain scale  - Administer analgesics based on type and severity of pain and evaluate response  - Implement non-pharmacological measures as appropriate and evaluate response  - Consider cultural and social influences on pain and pain management  - Notify physician/advanced practitioner if interventions unsuccessful or patient reports new pain  Outcome: Progressing     Problem: INFECTION - ADULT  Goal: Absence or prevention of progression during hospitalization  Description  INTERVENTIONS:  - Assess and monitor for signs and symptoms of infection  - Monitor lab/diagnostic results  - Monitor all insertion sites, i e  indwelling lines, tubes, and drains  - Monitor endotracheal if appropriate and nasal secretions for changes in amount and color  - [place] appropriate cooling/warming therapies per order  - Administer medications as ordered  - Instruct and encourage patient and family to use good hand hygiene technique  - Identify and instruct in appropriate isolation precautions for identified infection/condition  Outcome: Progressing  Goal: Absence of fever/infection during neutropenic period  Description  INTERVENTIONS:  - Monitor WBC    Outcome: Progressing     Problem: SAFETY ADULT  Goal: Maintain or return to baseline ADL function  Description  INTERVENTIONS:  -  Assess patient's ability to carry out ADLs; assess patient's baseline for ADL function and identify physical deficits which impact ability to perform ADLs (bathing, care of mouth/teeth, toileting, grooming, dressing, etc )  - Assess/evaluate cause of self-care deficits   - Assess range of motion  - Assess patient's mobility; develop plan if impaired  - Assess patient's need for assistive devices and provide as appropriate  - Encourage maximum independence but intervene and supervise when necessary  - Involve family in performance of ADLs  - Assess for home care needs following discharge   - Consider OT consult to assist with ADL evaluation and planning for discharge  - Provide patient education as appropriate  Outcome: Progressing  Goal: Maintain or return mobility status to optimal level  Description  INTERVENTIONS:  - Assess patient's baseline mobility status (ambulation, transfers, stairs, etc )    - Identify cognitive and physical deficits and behaviors that affect mobility  - Identify mobility aids required to assist with transfers and/or ambulation (gait belt, sit-to-stand, lift, walker, cane, etc )  - [place] fall precautions as indicated by assessment  - Record patient progress and toleration of activity level on Mobility SBAR; progress patient to next Phase/Stage  - Instruct patient to call for assistance with activity based on assessment  - Consider rehabilitation consult to assist with strengthening/weightbearing, etc   Outcome: Progressing     Problem: DISCHARGE PLANNING  Goal: Discharge to home or other facility with appropriate resources  Description  INTERVENTIONS:  - Identify barriers to discharge w/patient and caregiver  - Arrange for needed discharge resources and transportation as appropriate  - Identify discharge learning needs (meds, wound care, etc )  - Arrange for interpretive services to assist at discharge as needed  - Refer to Case Management Department for coordinating discharge planning if the patient needs post-hospital services based on physician/advanced practitioner order or complex needs related to functional status, cognitive ability, or social support system  Outcome: Progressing     Problem: Knowledge Deficit  Goal: Patient/family/caregiver demonstrates understanding of disease process, treatment plan, medications, and discharge instructions  Description  Complete learning assessment and assess knowledge base    Interventions:  - Provide teaching at level of understanding  - Provide teaching via preferred learning methods  Outcome: Progressing

## 2020-01-22 LAB
ANION GAP SERPL CALCULATED.3IONS-SCNC: 14 MMOL/L (ref 4–13)
BASOPHILS # BLD AUTO: 0.05 THOUSANDS/ΜL (ref 0–0.1)
BASOPHILS NFR BLD AUTO: 1 % (ref 0–1)
BUN SERPL-MCNC: 64 MG/DL (ref 5–25)
CALCIUM SERPL-MCNC: 8.1 MG/DL (ref 8.3–10.1)
CHLORIDE SERPL-SCNC: 106 MMOL/L (ref 100–108)
CO2 SERPL-SCNC: 16 MMOL/L (ref 21–32)
CREAT SERPL-MCNC: 3.85 MG/DL (ref 0.6–1.3)
EOSINOPHIL # BLD AUTO: 0.39 THOUSAND/ΜL (ref 0–0.61)
EOSINOPHIL NFR BLD AUTO: 5 % (ref 0–6)
ERYTHROCYTE [DISTWIDTH] IN BLOOD BY AUTOMATED COUNT: 15.6 % (ref 11.6–15.1)
GFR SERPL CREATININE-BSD FRML MDRD: 12 ML/MIN/1.73SQ M
GLUCOSE SERPL-MCNC: 104 MG/DL (ref 65–140)
GLUCOSE SERPL-MCNC: 125 MG/DL (ref 65–140)
GLUCOSE SERPL-MCNC: 163 MG/DL (ref 65–140)
GLUCOSE SERPL-MCNC: 87 MG/DL (ref 65–140)
GLUCOSE SERPL-MCNC: 96 MG/DL (ref 65–140)
HCT VFR BLD AUTO: 24.6 % (ref 34.8–46.1)
HGB BLD-MCNC: 7.8 G/DL (ref 11.5–15.4)
IMM GRANULOCYTES # BLD AUTO: 0.07 THOUSAND/UL (ref 0–0.2)
IMM GRANULOCYTES NFR BLD AUTO: 1 % (ref 0–2)
LYMPHOCYTES # BLD AUTO: 0.97 THOUSANDS/ΜL (ref 0.6–4.47)
LYMPHOCYTES NFR BLD AUTO: 14 % (ref 14–44)
MCH RBC QN AUTO: 32.1 PG (ref 26.8–34.3)
MCHC RBC AUTO-ENTMCNC: 31.7 G/DL (ref 31.4–37.4)
MCV RBC AUTO: 101 FL (ref 82–98)
MONOCYTES # BLD AUTO: 0.9 THOUSAND/ΜL (ref 0.17–1.22)
MONOCYTES NFR BLD AUTO: 13 % (ref 4–12)
NEUTROPHILS # BLD AUTO: 4.79 THOUSANDS/ΜL (ref 1.85–7.62)
NEUTS SEG NFR BLD AUTO: 66 % (ref 43–75)
NRBC BLD AUTO-RTO: 0 /100 WBCS
PLATELET # BLD AUTO: 163 THOUSANDS/UL (ref 149–390)
PMV BLD AUTO: 13 FL (ref 8.9–12.7)
POTASSIUM SERPL-SCNC: 4.1 MMOL/L (ref 3.5–5.3)
RBC # BLD AUTO: 2.43 MILLION/UL (ref 3.81–5.12)
SODIUM SERPL-SCNC: 136 MMOL/L (ref 136–145)
WBC # BLD AUTO: 7.17 THOUSAND/UL (ref 4.31–10.16)

## 2020-01-22 PROCEDURE — 99232 SBSQ HOSP IP/OBS MODERATE 35: CPT | Performed by: INTERNAL MEDICINE

## 2020-01-22 PROCEDURE — 80048 BASIC METABOLIC PNL TOTAL CA: CPT | Performed by: INTERNAL MEDICINE

## 2020-01-22 PROCEDURE — 82948 REAGENT STRIP/BLOOD GLUCOSE: CPT

## 2020-01-22 PROCEDURE — 85025 COMPLETE CBC W/AUTO DIFF WBC: CPT | Performed by: INTERNAL MEDICINE

## 2020-01-22 RX ORDER — LOPERAMIDE HYDROCHLORIDE 2 MG/1
2 CAPSULE ORAL ONCE
Status: COMPLETED | OUTPATIENT
Start: 2020-01-22 | End: 2020-01-22

## 2020-01-22 RX ORDER — SODIUM BICARBONATE 650 MG/1
1950 TABLET ORAL
Status: DISCONTINUED | OUTPATIENT
Start: 2020-01-22 | End: 2020-01-23 | Stop reason: HOSPADM

## 2020-01-22 RX ORDER — HYDRALAZINE HYDROCHLORIDE 25 MG/1
100 TABLET, FILM COATED ORAL 3 TIMES DAILY
Status: DISCONTINUED | OUTPATIENT
Start: 2020-01-22 | End: 2020-01-23 | Stop reason: HOSPADM

## 2020-01-22 RX ORDER — BISACODYL 10 MG
10 SUPPOSITORY, RECTAL RECTAL DAILY PRN
Status: DISCONTINUED | OUTPATIENT
Start: 2020-01-22 | End: 2020-01-23 | Stop reason: HOSPADM

## 2020-01-22 RX ADMIN — METOPROLOL TARTRATE 50 MG: 50 TABLET, FILM COATED ORAL at 08:11

## 2020-01-22 RX ADMIN — TACROLIMUS 2 MG: 1 CAPSULE ORAL at 22:06

## 2020-01-22 RX ADMIN — POLYETHYLENE GLYCOL 3350 17 G: 17 POWDER, FOR SOLUTION ORAL at 08:11

## 2020-01-22 RX ADMIN — SODIUM BICARBONATE 650 MG TABLET 1950 MG: at 11:46

## 2020-01-22 RX ADMIN — INSULIN GLARGINE 4 UNITS: 100 INJECTION, SOLUTION SUBCUTANEOUS at 22:07

## 2020-01-22 RX ADMIN — TORSEMIDE 20 MG: 20 TABLET ORAL at 08:11

## 2020-01-22 RX ADMIN — LOPERAMIDE HYDROCHLORIDE 2 MG: 2 CAPSULE ORAL at 22:06

## 2020-01-22 RX ADMIN — HYDRALAZINE HYDROCHLORIDE 100 MG: 25 TABLET ORAL at 16:35

## 2020-01-22 RX ADMIN — LEVOTHYROXINE SODIUM 125 MCG: 125 TABLET ORAL at 05:46

## 2020-01-22 RX ADMIN — INSULIN LISPRO 1 UNITS: 100 INJECTION, SOLUTION INTRAVENOUS; SUBCUTANEOUS at 17:04

## 2020-01-22 RX ADMIN — SODIUM BICARBONATE 650 MG TABLET 1950 MG: at 16:35

## 2020-01-22 RX ADMIN — ROPINIROLE HYDROCHLORIDE 0.25 MG: 0.25 TABLET, FILM COATED ORAL at 08:11

## 2020-01-22 RX ADMIN — DULOXETINE HYDROCHLORIDE 60 MG: 60 CAPSULE, DELAYED RELEASE ORAL at 17:04

## 2020-01-22 RX ADMIN — BUSPIRONE HYDROCHLORIDE 5 MG: 5 TABLET ORAL at 08:11

## 2020-01-22 RX ADMIN — SEVELAMER HYDROCHLORIDE 1600 MG: 800 TABLET, FILM COATED PARENTERAL at 11:46

## 2020-01-22 RX ADMIN — SEVELAMER HYDROCHLORIDE 1600 MG: 800 TABLET, FILM COATED PARENTERAL at 16:36

## 2020-01-22 RX ADMIN — DOXAZOSIN 2 MG: 1 TABLET ORAL at 22:06

## 2020-01-22 RX ADMIN — FOLIC ACID 1000 MCG: 1 TABLET ORAL at 08:10

## 2020-01-22 RX ADMIN — CEPHALEXIN 500 MG: 500 CAPSULE ORAL at 22:06

## 2020-01-22 RX ADMIN — CEPHALEXIN 500 MG: 500 CAPSULE ORAL at 05:44

## 2020-01-22 RX ADMIN — PRAVASTATIN SODIUM 80 MG: 80 TABLET ORAL at 16:35

## 2020-01-22 RX ADMIN — MELATONIN 3000 UNITS: at 08:11

## 2020-01-22 RX ADMIN — ASPIRIN 81 MG 81 MG: 81 TABLET ORAL at 08:10

## 2020-01-22 RX ADMIN — SODIUM BICARBONATE 650 MG TABLET 1300 MG: at 08:10

## 2020-01-22 RX ADMIN — TACROLIMUS 2 MG: 1 CAPSULE ORAL at 08:10

## 2020-01-22 RX ADMIN — CLONIDINE HYDROCHLORIDE 0.2 MG: 0.1 TABLET ORAL at 14:15

## 2020-01-22 RX ADMIN — BISACODYL 10 MG: 10 SUPPOSITORY RECTAL at 16:35

## 2020-01-22 RX ADMIN — AMLODIPINE BESYLATE 10 MG: 10 TABLET ORAL at 08:11

## 2020-01-22 RX ADMIN — SEVELAMER HYDROCHLORIDE 1600 MG: 800 TABLET, FILM COATED PARENTERAL at 08:12

## 2020-01-22 RX ADMIN — DOCUSATE SODIUM 100 MG: 100 CAPSULE, LIQUID FILLED ORAL at 17:04

## 2020-01-22 RX ADMIN — CEPHALEXIN 500 MG: 500 CAPSULE ORAL at 14:15

## 2020-01-22 RX ADMIN — DULOXETINE HYDROCHLORIDE 60 MG: 60 CAPSULE, DELAYED RELEASE ORAL at 08:10

## 2020-01-22 RX ADMIN — ARIPIPRAZOLE 30 MG: 10 TABLET ORAL at 22:11

## 2020-01-22 RX ADMIN — DOCUSATE SODIUM 100 MG: 100 CAPSULE, LIQUID FILLED ORAL at 08:10

## 2020-01-22 RX ADMIN — ROPINIROLE HYDROCHLORIDE 0.25 MG: 0.25 TABLET, FILM COATED ORAL at 17:04

## 2020-01-22 RX ADMIN — METOPROLOL TARTRATE 50 MG: 50 TABLET, FILM COATED ORAL at 22:06

## 2020-01-22 RX ADMIN — HYDRALAZINE HYDROCHLORIDE 100 MG: 25 TABLET ORAL at 22:06

## 2020-01-22 RX ADMIN — HYDRALAZINE HYDROCHLORIDE 75 MG: 25 TABLET ORAL at 08:11

## 2020-01-22 RX ADMIN — SENNOSIDES 8.6 MG: 8.6 TABLET, FILM COATED ORAL at 08:11

## 2020-01-22 RX ADMIN — BUSPIRONE HYDROCHLORIDE 5 MG: 5 TABLET ORAL at 17:04

## 2020-01-22 RX ADMIN — SERTRALINE HYDROCHLORIDE 200 MG: 100 TABLET ORAL at 08:11

## 2020-01-22 RX ADMIN — FERROUS SULFATE TAB 325 MG (65 MG ELEMENTAL FE) 325 MG: 325 (65 FE) TAB at 08:11

## 2020-01-22 RX ADMIN — CLONIDINE HYDROCHLORIDE 0.2 MG: 0.1 TABLET ORAL at 22:05

## 2020-01-22 RX ADMIN — CLONIDINE HYDROCHLORIDE 0.2 MG: 0.1 TABLET ORAL at 05:44

## 2020-01-22 RX ADMIN — PREDNISONE 5 MG: 5 TABLET ORAL at 08:11

## 2020-01-22 NOTE — ASSESSMENT & PLAN NOTE
· Patient admitted with hemoglobin 6 5  Baseline hemoglobin mid 7 range with known his anemia of chronic disease and multiple myeloma  · Most likely due to chronic kidney disease  ·   · Patient has warm auto antibody but tolerated 2 units of packed red blood cells on 01/18  Hemoglobin malia from 5 8 up to 8 3 yesterday  Today it is 7 8  Continue to monitor daily CBC  Transfuse for hemoglobin less than 7

## 2020-01-22 NOTE — ASSESSMENT & PLAN NOTE
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 and follows Dr Zohra Kay  She is status post kidney and pancreas transplantation 1998 on immuno suppression therapy with prednisone 5 mg daily, tacrolimus 2 mg b i d   · Presented to hospital at the request of her outpatient nephrologist due to worsening creatinine and anemia    Creatinine has been gradually improving during hospitalization  Creatinine steadily improving and is at 3 85 today  Ultrasound did not show any hydronephrosis or masses of patient's transplanted kidney  Both renal artery and renal vein were patent  Nephrology notes reviewed  Viral testing ordered including EBV, CMV and BK virus  Continue to monitor renal functions closely  Nephrology input appreciated

## 2020-01-22 NOTE — OCCUPATIONAL THERAPY NOTE
Occupational Therapy Screen        Patient Name: Sai Dorsey  RQRXN'T Date: 1/22/2020    OT orders received and chart review completed  Spoke w/ RN, Marycruz Connell  Contact made w/ pt from [id number]  Pt reports living alone in apt and supportive family assist as needed w/ IADL  Pt reports using bathroom w/ out nursing staff assistance and completing ADL at / near baseline level of I  Pt reports no concerns upon discharge   Recommend active participation in ADL w/ nursing staff while in acute care and pt can return [facility] to [facility] w/ family assist  D/C OT    Nay Marsh, OTR/L

## 2020-01-22 NOTE — PROGRESS NOTES
Progress Note - Jo Villanueva 1965, 53 y o  female MRN: [id number]    Unit/Bed#: S -01 Encounter: [id number]    Primary Care Provider: Lindsey Godoy MD   Date and time admitted to hospital: 1/17/2020  2:09 PM        * Anemia  Assessment & Plan  · Patient admitted with hemoglobin 6 5  Baseline hemoglobin mid 7 range with known his anemia of chronic disease and multiple myeloma  · Most likely due to chronic kidney disease  ·   · Patient has warm auto antibody but tolerated 2 units of packed red blood cells on 01/18  Hemoglobin malia from 5 8 up to 8 3 yesterday  Today it is 7 8  Continue to monitor daily CBC  Transfuse for hemoglobin less than 7  Acute renal failure superimposed on stage 4 chronic kidney disease ([facility] [address] )  Assessment & Plan  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 and follows Dr Román Mcneal  She is status post kidney and pancreas transplantation 1998 on immuno suppression therapy with prednisone 5 mg daily, tacrolimus 2 mg b i d   · Presented to hospital at the request of her outpatient nephrologist due to worsening creatinine and anemia    Creatinine has been gradually improving during hospitalization  Creatinine steadily improving and is at 3 85 today  Ultrasound did not show any hydronephrosis or masses of patient's transplanted kidney  Both renal artery and renal vein were patent  Nephrology notes reviewed  Viral testing ordered including EBV, CMV and BK virus  Continue to monitor renal functions closely  Nephrology input appreciated      Asymptomatic bacteriuria  Assessment & Plan  · Urine culture positive for gram negative rods, ID following   · Known chronic bacteriuria with intermittent dysuria Patient did start having some UTI symptoms  Daptomycin was switched to Rocephin  She currently on Keflex to complete 7 days of treatment  Id following  Patient was recommended self catheterization for possible episodes of urinary retention  Patient however has 0 postvoid residual   She refuses to self-catheterize     Continue with outpatient Urology follow-up  Multiple myeloma not having achieved remission 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  · Currently off Revlimid due to frequent readmissions to the hospital for volume overload, constipation/fecal impaction/colitis, urinary tract infection  · Recommended outpatient Oncology follow-up on discharge    Chronic constipation  Assessment & Plan  · Evaluated by Gastroenterology last admission for abdominal pain, constipation and fecal stasis  · Reports frequent diarrhea at home while on bowel regimen but now constipated  · Monitor stool output and treat diarrhea versus constipation accordingly  Dulcolax suppository today  · Outpatient colonoscopy recommended    Chronic diastolic congestive heart failure (HCC)  Assessment & Plan  Wt Readings from Last 3 Encounters:   01/22/20 98 9 kg (218 lb)   01/17/20 99 3 kg (219 lb)   01/08/20 100 kg (221 lb)   · Echocardiogram most recently with normal ejection fraction, grade 1 diastolic dysfunction with no wall motion abnormalities, moderate aortic regurgitation and trace pericardial effusion  · Last admission patient found to be in acute on chronic diastolic heart failure and was diuresed by Nephrology and received IV albumin and switch to p o   Torsemide  · Renal resumed torsemide 1/18    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)  Assessment & Plan  Lab Results   Component Value Date    HGBA1C 4 9 12/28/2019 Recent Labs     20  1559 20  2114 20  0826 20  1104   POCGLU 130 104 104 125     Blood Sugar Average: Last 72 hrs:  · (P) 116 [id number]   · Continue Lantus 5 units q h s , resume Toujeo at d/c  · SSI, accuchecks, ADA     Benign hypertension with CKD (chronic kidney disease) stage IV (HCC)  Assessment & Plan  · Home regimen includes clonidine 0 2 mg t i d , hydralazine 50 mg Q 8, Cardura 20 mg at bedtime, Lopressor 50 mg twice daily, Norvasc 10 mg daily with hold parameters  · Hold Aldactone 25 mg daily   · Torsemide 20 mg resumed   · Avoid hypotension    Acquired hypothyroidism  Assessment & Plan  · Continue Synthroid 125mcg qd        VTE Pharmacologic Prophylaxis:   Pharmacologic: Heparin  Mechanical VTE Prophylaxis in Place: No    Patient Centered Rounds: I have performed bedside rounds with nursing staff today  Discussions with Specialists or Other Care Team Provider:  Discussed with nephrology    Education and Discussions with Family / Patient:  Discussed with patient's father over the phone    Time Spent for Care: 20 minutes  More than 50% of total time spent on counseling and coordination of care as described above  Current Length of Stay: 5 day(s)    Current Patient Status: Inpatient   Certification Statement: The patient will continue to require additional inpatient hospital stay due to Close monitoring of renal functions  Discharge Plan:  Likely in the next 24 hours    Code Status: Level 1 - Full Code      Subjective:   Patient continues to remain report constipation  Denies any other complaints  Objective:     Vitals:   Temp (24hrs), Av 8 °F (36 6 °C), Min:97 5 °F (36 4 °C), Max:98 3 °F (36 8 °C)    Temp:  [97 5 °F (36 4 °C)-98 3 °F (36 8 °C)] 97 5 °F (36 4 °C)  HR:  [56-58] 58  Resp:  [18] 18  BP: (142-181)/(40-76) 142/72  SpO2:  [95 %-96 %] 96 %  Body mass index is 34 14 kg/m²  Input and Output Summary (last 24 hours):        Intake/Output Summary (Last 24 hours) at 1/22/2020 1545  Last data filed at 1/22/2020 0608  Gross per 24 hour   Intake    Output 800 ml   Net -800 ml       Physical Exam:     Physical Exam   Constitutional: She is oriented to person, place, and time  No distress  HENT:   Head: Normocephalic and atraumatic  Eyes: Pupils are equal, round, and reactive to light  EOM are normal    Neck: Normal range of motion  Neck supple  Cardiovascular: Normal rate  Pulmonary/Chest: Effort normal    Abdominal: Soft  Musculoskeletal: She exhibits no edema  Neurological: She is alert and oriented to person, place, and time  Skin: Skin is warm  She is not diaphoretic  Psychiatric: She has a normal mood and affect  Additional Data:     Labs:    Results from last 7 days   Lab Units 01/22/20  0556   WBC Thousand/uL 7 17   HEMOGLOBIN g/dL 7 8*   HEMATOCRIT % 24 6*   PLATELETS Thousands/uL 163   NEUTROS PCT % 66   LYMPHS PCT % 14   MONOS PCT % 13*   EOS PCT % 5     Results from last 7 days   Lab Units 01/22/20  0556  01/17/20  0758   SODIUM mmol/L 136   < > 140   POTASSIUM mmol/L 4 1   < > 4 9   CHLORIDE mmol/L 106   < > 112*   CO2 mmol/L 16*   < > 18*   BUN mg/dL 64*   < > 49*   CREATININE mg/dL 3 85*   < > 4 19*   ANION GAP mmol/L 14*   < > 10   CALCIUM mg/dL 8 1*   < > 8 4   ALBUMIN g/dL  --   --  3 1*   TOTAL BILIRUBIN mg/dL  --   --  0 53   ALK PHOS U/L  --   --  95   ALT U/L  --   --  14   AST U/L  --   --  7   GLUCOSE RANDOM mg/dL 96   < >  --     < > = values in this interval not displayed  Results from last 7 days   Lab Units 01/17/20  1516   INR  1 09     Results from last 7 days   Lab Units 01/22/20  1104 01/22/20  0826 01/21/20  2114 01/21/20  1559 01/21/20  1116 01/21/20  0814 01/20/20  2102 01/20/20  1625 01/20/20  1042 01/20/20  0738 01/19/20  2120 01/19/20  1623   POC GLUCOSE mg/dl 125 104 104 130 132 101 101 134 131 106 107 122                   * I Have Reviewed All Lab Data Listed Above    * Additional Pertinent Lab Tests Reviewed:  [address] Admission Reviewed    Imaging:    Imaging Reports Reviewed Today Include: NA  Recent Cultures (last 7 days):     Results from last 7 days   Lab Units 01/17/20 2019   URINE CULTURE  50,000-59,000 cfu/ml Escherichia coli*  10,000-19,000 cfu/ml        Last 24 Hours Medication List:     Current Facility-Administered Medications:  acetaminophen 650 mg Oral Q6H PRN Praful Aden PA-C   amLODIPine 10 mg Oral Daily Praful Aden PA-C   ARIPiprazole 30 mg Oral HS Praful Aden PA-C   aspirin 81 mg Oral Daily Praful Aden PA-C   bisacodyl 10 mg Rectal Daily PRN Haroldo Kaplan MD   busPIRone 5 mg Oral BID Praful Aden PA-C   cephalexin 500 mg Oral Q8H [address] Noa Jacobsen MD   cholecalciferol 3,000 Units Oral Daily Praful Aden PA-C   cloNIDine 0 2 mg Oral Q8H [address] Praful Aden PA-C   diphenhydrAMINE 25 mg Intravenous Q6H PRN Praful Aden PA-C   docusate sodium 100 mg Oral BID Praful Aden PA-C   doxazosin 2 mg Oral HS Praful Aden PA-C   DULoxetine 60 mg Oral BID Praful Aden PA-C   ferrous sulfate 325 mg Oral Daily With Breakfast Praful Aden PA-C   folic acid 8,249 mcg Oral Daily Praful Aden PA-C   hydrALAZINE 100 mg Oral TID Loli Marrufo MD   insulin glargine 4 Units Subcutaneous HS Chelsea Rivas MD   insulin lispro 1-5 Units Subcutaneous HS Praful Aden PA-C   insulin lispro 1-5 Units Subcutaneous TID AC Praful Aden PA-C   levothyroxine 125 mcg Oral Daily Praful Aden PA-C   LORazepam 2 mg Oral TID PRN Tobias Aden PA-C   metoprolol tartrate 50 mg Oral Q12H [address] Selena Green MD   polyethylene glycol 17 g Oral Daily Praful Aden PA-C   pravastatin 80 mg Oral Daily With [employer] Aden PA-C   predniSONE 5 mg Oral Daily Praful Aden PA-C   rOPINIRole 0 25 mg Oral BID Praful Aden PA-C   senna 1 tablet Oral Daily Praful Aden PA-C   sertraline 200 mg Oral Daily Praful Aden PA-C   sevelamer 1,600 mg Oral TID With Meals Praful Aden PA-C   sodium bicarbonate 1,950 mg Oral TID after meals Vicie Krabbe, MD   tacrolimus 2 mg Oral Q12H [address] Praful Aden PA-C   torsemide 20 mg Oral Daily Boni Leigh MD        Today, Patient Was Seen By: Shayla Owens MD    ** Please Note: Dictation voice to text software may have been used in the creation of this document   **

## 2020-01-22 NOTE — PROGRESS NOTES
Progress Note - Infectious Disease   Domitila Kennedy 53 y o  female MRN: [id number]  Unit/Bed#: S -01 Encounter: [id number]      Impression/Plan:  1  Symptomatic UTI  Camilo Sung has history of bladder colonization with MDR pathogens, most recently VRE   Her UA is grossly abnormal and she is now growing E  Coli sensitive to cefazolin   Dysuria is persistent at end of urine stream yet  RN reports PVR last night was 61 mL   0 mL PVRs also charted in computer on  and 20  Continues Keflex 500 mg p o  Every 8 hours to complete a 7 day course total through 2020  Monitor urinary symptoms  Monitor temperature/WBC      2  EDUARDO, superimposed on CKD   Creatinine has slowly improving: 3 85 today  Antibiotic dosages adjusted accordingly to q 8 hour dosing  Monitor creatinine  Nephrology following closely      3  Status post renal transplant   No evidence of pyelonephritis of transplanted kidney      4  Multiple myeloma   Patient had been on Revlimid previously but on hold due to multiple admissions for acute illnesses recently      Discussed with patient in detail regarding the above plan      Antibiotics:  Cephalexin abx D3     Subjective:  Patient reports she does not have dysuria at the end of urination today  She is quite tearful regarding the possibility of possibly going home tomorrow  Her creatinine number is down to 3 85  She is to receive a suppository today an effort to have a bowel movement  Last bowel movement was on Friday and she is feeling somewhat bloated  Otherwise,  She has no focal abdominal or flank pain  No LLQT over transplant kidney site  Temperature stays down   No chills    She is tolerating antibiotic well   No nausea, vomiting      Objective:  Vitals:  Temp:  [97 5 °F (36 4 °C)-98 3 °F (36 8 °C)] 98 3 °F (36 8 °C)  HR:  [56-58] 56  Resp:  [18] 18  BP: (148-181)/(40-76) 171/66  SpO2:  [95 %-97 %] 96 %  Temp (24hrs), Av 9 °F (36 6 °C), Min:97 5 °F (36 4 °C), Max:98 3 °F (36 8 °C)  Current: Temperature: 98 3 °F (36 8 °C)    General Appearance:  Awake, alert, cooperative, resting in recliner, no acute distress  Cheerful today    Throat: Oropharynx moist without lesions  Lungs:   Clear to auscultation bilaterally; no wheezes, rhonchi or rales; respirations unlabored   Heart:  RRR; S1-S2 heard, no murmur   Abdomen:   Soft, non-tender, non-distended, positive bowel sounds  Extremities: No edema, arm IV site nontender   : No Moreno catheter, no suprapubic tenderness, no left lower quadrant tenderness over transplant kidney site  Skin: No rashes      Labs, Imaging, & Other studies:   All pertinent labs and imaging studies were personally reviewed  Results from last 7 days   Lab Units 01/22/20  0556 01/21/20  0440 01/20/20  0628   WBC Thousand/uL 7 17 7 98 8 53   HEMOGLOBIN g/dL 7 8* 7 7* 7 9*   PLATELETS Thousands/uL 163 160 154     Results from last 7 days   Lab Units 01/22/20  0556  01/17/20  0758   POTASSIUM mmol/L 4 1   < > 4 9   CHLORIDE mmol/L 106   < > 112*   CO2 mmol/L 16*   < > 18*   BUN mg/dL 64*   < > 49*   CREATININE mg/dL 3 85*   < > 4 19*   EGFR ml/min/1 73sq m 12   < > 11   CALCIUM mg/dL 8 1*   < > 8 4   AST U/L  --   --  7   ALT U/L  --   --  14   ALK PHOS U/L  --   --  95    < > = values in this interval not displayed           Results from last 7 days   Lab Units 01/17/20  2019   URINE CULTURE  50,000-59,000 cfu/ml Escherichia coli*  10,000-19,000 cfu/ml

## 2020-01-22 NOTE — ASSESSMENT & PLAN NOTE
· Evaluated by Gastroenterology last admission for abdominal pain, constipation and fecal stasis  · Reports frequent diarrhea at home while on bowel regimen but now constipated  · Monitor stool output and treat diarrhea versus constipation accordingly  Dulcolax suppository today    · Outpatient colonoscopy recommended

## 2020-01-22 NOTE — PLAN OF CARE
Problem: Potential for Falls  Goal: Patient will remain free of falls  Description  INTERVENTIONS:  - Assess patient frequently for physical needs  -  Identify cognitive and physical deficits and behaviors that affect risk of falls    -  [place] fall precautions as indicated by assessment   - Educate patient/family on patient safety including physical limitations  - Instruct patient to call for assistance with activity based on assessment  - Modify environment to reduce risk of injury  - Consider OT/PT consult to assist with strengthening/mobility  Outcome: Progressing     Problem: PAIN - ADULT  Goal: Verbalizes/displays adequate comfort level or baseline comfort level  Description  Interventions:  - Encourage patient to monitor pain and request assistance  - Assess pain using appropriate pain scale  - Administer analgesics based on type and severity of pain and evaluate response  - Implement non-pharmacological measures as appropriate and evaluate response  - Consider cultural and social influences on pain and pain management  - Notify physician/advanced practitioner if interventions unsuccessful or patient reports new pain  Outcome: Progressing     Problem: INFECTION - ADULT  Goal: Absence or prevention of progression during hospitalization  Description  INTERVENTIONS:  - Assess and monitor for signs and symptoms of infection  - Monitor lab/diagnostic results  - Monitor all insertion sites, i e  indwelling lines, tubes, and drains  - Monitor endotracheal if appropriate and nasal secretions for changes in amount and color  - [place] appropriate cooling/warming therapies per order  - Administer medications as ordered  - Instruct and encourage patient and family to use good hand hygiene technique  - Identify and instruct in appropriate isolation precautions for identified infection/condition  Outcome: Progressing  Goal: Absence of fever/infection during neutropenic period  Description  INTERVENTIONS:  - Monitor WBC    Outcome: Progressing     Problem: SAFETY ADULT  Goal: Maintain or return to baseline ADL function  Description  INTERVENTIONS:  -  Assess patient's ability to carry out ADLs; assess patient's baseline for ADL function and identify physical deficits which impact ability to perform ADLs (bathing, care of mouth/teeth, toileting, grooming, dressing, etc )  - Assess/evaluate cause of self-care deficits   - Assess range of motion  - Assess patient's mobility; develop plan if impaired  - Assess patient's need for assistive devices and provide as appropriate  - Encourage maximum independence but intervene and supervise when necessary  - Involve family in performance of ADLs  - Assess for home care needs following discharge   - Consider OT consult to assist with ADL evaluation and planning for discharge  - Provide patient education as appropriate  Outcome: Progressing  Goal: Maintain or return mobility status to optimal level  Description  INTERVENTIONS:  - Assess patient's baseline mobility status (ambulation, transfers, stairs, etc )    - Identify cognitive and physical deficits and behaviors that affect mobility  - Identify mobility aids required to assist with transfers and/or ambulation (gait belt, sit-to-stand, lift, walker, cane, etc )  - [place] fall precautions as indicated by assessment  - Record patient progress and toleration of activity level on Mobility SBAR; progress patient to next Phase/Stage  - Instruct patient to call for assistance with activity based on assessment  - Consider rehabilitation consult to assist with strengthening/weightbearing, etc   Outcome: Progressing     Problem: DISCHARGE PLANNING  Goal: Discharge to home or other facility with appropriate resources  Description  INTERVENTIONS:  - Identify barriers to discharge w/patient and caregiver  - Arrange for needed discharge resources and transportation as appropriate  - Identify discharge learning needs (meds, wound care, etc )  - Arrange for interpretive services to assist at discharge as needed  - Refer to Case Management Department for coordinating discharge planning if the patient needs post-hospital services based on physician/advanced practitioner order or complex needs related to functional status, cognitive ability, or social support system  Outcome: Progressing     Problem: Knowledge Deficit  Goal: Patient/family/caregiver demonstrates understanding of disease process, treatment plan, medications, and discharge instructions  Description  Complete learning assessment and assess knowledge base    Interventions:  - Provide teaching at level of understanding  - Provide teaching via preferred learning methods  Outcome: Progressing

## 2020-01-22 NOTE — ASSESSMENT & PLAN NOTE
· Urine culture positive for gram negative rods, ID following   · Known chronic bacteriuria with intermittent dysuria     Patient did start having some UTI symptoms  Daptomycin was switched to Rocephin  She currently on Keflex to complete 7 days of treatment  Id following  Patient was recommended self catheterization for possible episodes of urinary retention  Patient however has 0 postvoid residual   She refuses to self-catheterize     Continue with outpatient Urology follow-up

## 2020-01-22 NOTE — PLAN OF CARE
Problem: Potential for Falls  Goal: Patient will remain free of falls  Description  INTERVENTIONS:  - Assess patient frequently for physical needs  -  Identify cognitive and physical deficits and behaviors that affect risk of falls    -  [place] fall precautions as indicated by assessment   - Educate patient/family on patient safety including physical limitations  - Instruct patient to call for assistance with activity based on assessment  - Modify environment to reduce risk of injury  - Consider OT/PT consult to assist with strengthening/mobility  Outcome: Progressing     Problem: PAIN - ADULT  Goal: Verbalizes/displays adequate comfort level or baseline comfort level  Description  Interventions:  - Encourage patient to monitor pain and request assistance  - Assess pain using appropriate pain scale  - Administer analgesics based on type and severity of pain and evaluate response  - Implement non-pharmacological measures as appropriate and evaluate response  - Consider cultural and social influences on pain and pain management  - Notify physician/advanced practitioner if interventions unsuccessful or patient reports new pain  Outcome: Progressing     Problem: INFECTION - ADULT  Goal: Absence or prevention of progression during hospitalization  Description  INTERVENTIONS:  - Assess and monitor for signs and symptoms of infection  - Monitor lab/diagnostic results  - Monitor all insertion sites, i e  indwelling lines, tubes, and drains  - Monitor endotracheal if appropriate and nasal secretions for changes in amount and color  - [place] appropriate cooling/warming therapies per order  - Administer medications as ordered  - Instruct and encourage patient and family to use good hand hygiene technique  - Identify and instruct in appropriate isolation precautions for identified infection/condition  Outcome: Progressing  Goal: Absence of fever/infection during neutropenic period  Description  INTERVENTIONS:  - Monitor WBC    Outcome: Progressing     Problem: SAFETY ADULT  Goal: Maintain or return to baseline ADL function  Description  INTERVENTIONS:  -  Assess patient's ability to carry out ADLs; assess patient's baseline for ADL function and identify physical deficits which impact ability to perform ADLs (bathing, care of mouth/teeth, toileting, grooming, dressing, etc )  - Assess/evaluate cause of self-care deficits   - Assess range of motion  - Assess patient's mobility; develop plan if impaired  - Assess patient's need for assistive devices and provide as appropriate  - Encourage maximum independence but intervene and supervise when necessary  - Involve family in performance of ADLs  - Assess for home care needs following discharge   - Consider OT consult to assist with ADL evaluation and planning for discharge  - Provide patient education as appropriate  Outcome: Progressing  Goal: Maintain or return mobility status to optimal level  Description  INTERVENTIONS:  - Assess patient's baseline mobility status (ambulation, transfers, stairs, etc )    - Identify cognitive and physical deficits and behaviors that affect mobility  - Identify mobility aids required to assist with transfers and/or ambulation (gait belt, sit-to-stand, lift, walker, cane, etc )  - [place] fall precautions as indicated by assessment  - Record patient progress and toleration of activity level on Mobility SBAR; progress patient to next Phase/Stage  - Instruct patient to call for assistance with activity based on assessment  - Consider rehabilitation consult to assist with strengthening/weightbearing, etc   Outcome: Progressing     Problem: DISCHARGE PLANNING  Goal: Discharge to home or other facility with appropriate resources  Description  INTERVENTIONS:  - Identify barriers to discharge w/patient and caregiver  - Arrange for needed discharge resources and transportation as appropriate  - Identify discharge learning needs (meds, wound care, etc )  - Arrange for interpretive services to assist at discharge as needed  - Refer to Case Management Department for coordinating discharge planning if the patient needs post-hospital services based on physician/advanced practitioner order or complex needs related to functional status, cognitive ability, or social support system  Outcome: Progressing     Problem: Knowledge Deficit  Goal: Patient/family/caregiver demonstrates understanding of disease process, treatment plan, medications, and discharge instructions  Description  Complete learning assessment and assess knowledge base    Interventions:  - Provide teaching at level of understanding  - Provide teaching via preferred learning methods  Outcome: Progressing

## 2020-01-22 NOTE — PROGRESS NOTES
NEPHROLOGY PROGRESS NOTE   Shelly Leggett 53 y o  female MRN: [id number]  Unit/Bed#: S -01 Encounter: [id number]    ASSESSMENT & PLAN:  59-year-old female with known history of chronic kidney disease stage 4 with baseline creatinine 2 5-3 0, admitted for elevated creatinine  · Acute kidney injury, POA  · Admission creatinine was 4 42 on 1/17  · Acute kidney injury likely prerenal secondary to severe anemia on admission and possibly mild volume depletion from diuretics  Admission hemoglobin was 6 5 and dropped to 5 8 on 01/18  Also has UTI which could be contributing  Patient was also recently restarted in January on outpatient Revlimid for Multiple myeloma by Hematology Oncology which could also contribute to elevated creatinine  · Continue to monitor renal function  Avoid nephrotoxins  · Bladder scans were negative  · Renal ultrasound did not show any hydronephrosis in the transplanted kidney  · UA with urine microscopy showed 2+ protein, numerous WBC and RBC  · Renal function today improving to creatinine 3 85 on 1/22/20  Possible discharge tomorrow if renal function stable or better  · Avoid hypotension  · Follow-up viral panels-CMV, EBV and BK virus testing  · Chronic kidney disease stage 4:  Baseline creatinine 2 5-3 0 recently,  Follows with Dr Ladd Linker  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  · Status post kidney pancreas transplant in 1998, on immunosuppressive medication:  Prednisone 5 mg daily, tacrolimus 2 mg every 12 hours  Last Prograf level was 4 9 on 01/08  · Anemia:    · Status post 2 units PRBC  Admission hemoglobin was 6 5 and trended down to 5 8 on 01/18  · Continue monitor hemoglobin per primary team    · Anemia likely multifactorial:  Multiple myeloma, chemotherapy, CKD  Iron stores showed iron saturation 30%  · On oral iron tablets   · May consider PRBC if hemoglobin drops below 7 0   Hb today stable at 7 8    · Hyperphosphatemia  · Phosphorus level was 5 3 on 01/18  · Continue phosphorus binders-sevelamer  · Primary hypertension with chronic kidney disease:  · BP elevated today am but has been fluctuating  · Continue torsemide at current dose  Increased hydralazine to 100 mg tid  · Continue holding aldactone with recent worsening of renal function and risk of hyperkalemia   · Metabolic acidosis:  Bicarbonate level low at 16  Continue sodium bicarbonate, increased dose to 1950 mg tid with meals  Would monitor as outpatient  · UTI, E  Coli:  Currently on Keflex-  Continue antibiotic per ID  · Multiple myeloma:  Management per Hematology Oncology  Currently of Revlimid  Would recommend close monitoring of renal function if started back on Revlimid  Discussed with Dr Cheli Kaur  Possible discharge tomorrow if renal function remains stable  Would arrange for outpatient follow-up after discharge repeat BMP in 4-5 days  SUBJECTIVE:  No SOB  No chest pain  OBJECTIVE:  Current Weight: Weight - Scale: 98 9 kg (218 lb)  Vitals:    01/22/20 0824   BP: (!) 171/66   Pulse:    Resp:    Temp:    SpO2:        Intake/Output Summary (Last 24 hours) at 1/22/2020 0912  Last data filed at 1/22/2020 5715  Gross per 24 hour   Intake    Output 800 ml   Net -800 ml       Physical Exam  General:  Ill looking, awake  Eyes: Conjunctivae pink,  Sclera anicteric  ENT: lips and mucous membranes moist  Neck: supple   Chest: Clear to Auscultation both lungs,  no crackles, ronchus or wheezing  CVS: S1 & S2 present, normal rate, regular rhythm, no murmur    Abdomen: soft, non-tender, non-distended, Bowel sounds normoactive  Extremities: trace edema improving   Skin: no rash  Neuro: awake, alert, oriented x 3         Medications:    Current Facility-Administered Medications:     acetaminophen (TYLENOL) tablet 650 mg, 650 mg, Oral, Q6H PRN, Praful Aden PA-C, 650 mg at 01/21/20 0358    amLODIPine (NORVASC) tablet 10 mg, 10 mg, Oral, Daily, Praful Aden PA-C, 10 mg at 01/22/20 0811    ARIPiprazole (ABILIFY) tablet 30 mg, 30 mg, Oral, HS, Praful Aden PA-C, 30 mg at 01/21/20 2154    aspirin chewable tablet 81 mg, 81 mg, Oral, Daily, JODI Keaen-C, 81 mg at 01/22/20 0810    busPIRone (BUSPAR) tablet 5 mg, 5 mg, Oral, BID, JODI Keane-C, 5 mg at 01/22/20 4973    cephalexin (KEFLEX) capsule 500 mg, 500 mg, Oral, Q8H [address], Alton Blancas MD, 500 mg at 01/22/20 0544    cholecalciferol (VITAMIN D3) tablet 3,000 Units, 3,000 Units, Oral, Daily, Praful Aden PA-C, 3,000 Units at 01/22/20 0811    cloNIDine (CATAPRES) tablet 0 2 mg, 0 2 mg, Oral, Q8H [address], Praful Aden PA-C, 0 2 mg at 01/22/20 0544    diphenhydrAMINE (BENADRYL) injection 25 mg, 25 mg, Intravenous, Q6H PRN, Lolis Aden PA-C, Stopped at 01/18/20 1700    docusate sodium (COLACE) capsule 100 mg, 100 mg, Oral, BID, Praful Aden PA-C, 100 mg at 01/22/20 0810    doxazosin (CARDURA) tablet 2 mg, 2 mg, Oral, HS, Praful Aden PA-C, 2 mg at 01/21/20 2152    DULoxetine (CYMBALTA) delayed release capsule 60 mg, 60 mg, Oral, BID, Praful Aden PA-C, 60 mg at 01/22/20 5573    ferrous sulfate tablet 325 mg, 325 mg, Oral, Daily With Breakfast, Praful Aden PA-C, 325 mg at 26/25/02 8862    folic acid (FOLVITE) tablet 1,000 mcg, 1,000 mcg, Oral, Daily, Praful Aden PA-C, 1,000 mcg at 01/22/20 0810    hydrALAZINE (APRESOLINE) tablet 75 mg, 75 mg, Oral, TID, Muriel Leavitt MD, 75 mg at 01/22/20 0811    insulin glargine (LANTUS) subcutaneous injection 4 Units 0 04 mL, 4 Units, Subcutaneous, HS, Sarha Livingston MD, 4 Units at 01/21/20 2153    insulin lispro (HumaLOG) 100 units/mL subcutaneous injection 1-5 Units, 1-5 Units, Subcutaneous, HS, Praful Aden PA-C    insulin lispro (HumaLOG) 100 units/mL subcutaneous injection 1-5 Units, 1-5 Units, Subcutaneous, TID AC, 1 Units at 01/18/20 1132 **AND** Fingerstick Glucose (POCT), , , TID AC, Praful L JOHN Aden    levothyroxine tablet 125 mcg, 125 mcg, Oral, Daily, Praful Aden PA-C, 125 mcg at 01/22/20 0546    LORazepam (ATIVAN) tablet 2 mg, 2 mg, Oral, TID PRN, Julian Aden PA-C    metoprolol tartrate (LOPRESSOR) tablet 50 mg, 50 mg, Oral, Q12H [facility], Anitha Franco MD, 50 mg at 01/22/20 1667    polyethylene glycol (MIRALAX) packet 17 g, 17 g, Oral, Daily, Praful Aden PA-C, 17 g at 01/22/20 6574    pravastatin (PRAVACHOL) tablet 80 mg, 80 mg, Oral, Daily With Kathleen Aden PA-C, 80 mg at 01/21/20 1735    predniSONE tablet 5 mg, 5 mg, Oral, Daily, Praful Aden PA-C, 5 mg at 01/22/20 9849    rOPINIRole (REQUIP) tablet 0 25 mg, 0 25 mg, Oral, BID, Praful Aden PA-C, 0 25 mg at 01/22/20 9329    senna (SENOKOT) tablet 8 6 mg, 1 tablet, Oral, Daily, Praful Aden PA-C, 8 6 mg at 01/22/20 3836    sertraline (ZOLOFT) tablet 200 mg, 200 mg, Oral, Daily, Praful Aden PA-C, 200 mg at 01/22/20 3350    sevelamer (RENAGEL) tablet 1,600 mg, 1,600 mg, Oral, TID With Meals, Alesha Aden PA-C, 1,600 mg at 01/22/20 3748    sodium bicarbonate tablet 1,300 mg, 1,300 mg, Oral, 4x Daily, Michael López MD, 1,300 mg at 01/22/20 0810    tacrolimus (PROGRAF) capsule 2 mg, 2 mg, Oral, Q12H [facility], Praful Aden PA-C, 2 mg at 01/22/20 0810    torsemide (DEMADEX) tablet 20 mg, 20 mg, Oral, Daily, Anitha Franco MD, 20 mg at 01/22/20 8090    Invasive Devices:        Lab Results:   Results from last 7 days   Lab Units 01/22/20  0556 01/21/20  0440 01/20/20  0628  01/18/20  0439  01/17/20  0758   WBC Thousand/uL 7 17 7 98 8 53   < > 7 63   < > 8 00   HEMOGLOBIN g/dL 7 8* 7 7* 7 9*   < > 5 8*   < > 6 5*   HEMATOCRIT % 24 6* 24 5* 25 0*   < > 19 2*   < > 21 1*   PLATELETS Thousands/uL 163 160 154   < > 151   < > 163   POTASSIUM mmol/L 4 1 4 1 4 4   < > 4 8   < > 4 9   CHLORIDE mmol/L 106 105 107   < > 105   < > 112*   CO2 mmol/L 16* 18* 18*   < > 19*   < > 18*   BUN mg/dL 64* 64* 65*   < > 57*   < > 49*   CREATININE mg/dL 3 85* 4 08* 4 37*   < > 4 44*   < > 4 19*   CALCIUM mg/dL 8 1* 7 9* 7 9*   < > 8 2*   < > 8 4   MAGNESIUM mg/dL  --   --   --   --  2 7*  --  2 9*   PHOSPHORUS mg/dL  --   --   --   --  5 3*  --  4 6*   ALK PHOS U/L  --   --   --   --   --   --  95   ALT U/L  --   --   --   --   --   --  14   AST U/L  --   --   --   --   --   --  7    < > = values in this interval not displayed  Previous work up:  RENAL ULTRASOUND     INDICATION:   AK I      COMPARISON: 11/5/2019     TECHNIQUE:   Ultrasound of the retroperitoneum was performed with a curvilinear transducer utilizing volumetric sweeps and still imaging techniques       FINDINGS:     NATIVE KIDNEYS:  There is severe bilateral renal atrophy with measurements below  Right kidney:  6 4 x 3 2 cm  Left kidney: cm      Right kidney  The renal parenchyma is diffusely echogenic consistent with medical renal disease  No suspicious masses detected  1 3 x 1 2 x 1 2 cm simple cyst in the lower pole is present  No hydronephrosis  No shadowing calculi  No perinephric fluid collections      Left kidney  No definitive renal parenchyma seen within the left renal fossa  No suspicious masses identified      Transplant kidney:  Transplant kidney is again seen within the left lower quadrant  Transplant kidney measures 12 0 x 5 9 cm  There is normal cortical echogenicity and cortical medullary differentiation  1 5 x 1 1 x 1 0 cm simple cyst is not significantly changed  No   suspicious masses identified  The renal vein and artery are patent  Resistive indices in the upper pole are elevated measuring up to 0 9  No hydronephrosis or perinephric fluid collection      URETERS:  Nonvisualized      BLADDER:   Underdistended and therefore suboptimally evaluated      IMPRESSION:     1  Transplant kidney without hydronephrosis or suspicious mass    Resistive indices are slightly elevated within the upper pole; however, renal vein and artery are patent      2   Marked atrophy of native kidneys with no definitive renal parenchyma seen within the left renal fossa  No hydronephrosis or suspicious masses  Portions of the record may have been created with voice recognition software  Occasional wrong word or "sound a like" substitutions may have occurred due to the inherent limitations of voice recognition software  Read the chart carefully and recognize, using context, where substitutions have occurred  If you have any questions, please contact the dictating provider

## 2020-01-22 NOTE — ASSESSMENT & PLAN NOTE
Wt Readings from Last 3 Encounters:   01/22/20 98 9 kg (218 lb)   01/17/20 99 3 kg (219 lb)   01/08/20 100 kg (221 lb)   · Echocardiogram most recently with normal ejection fraction, grade 1 diastolic dysfunction with no wall motion abnormalities, moderate aortic regurgitation and trace pericardial effusion  · Last admission patient found to be in acute on chronic diastolic heart failure and was diuresed by Nephrology and received IV albumin and switch to p o   Torsemide  · Renal resumed torsemide 1/18

## 2020-01-22 NOTE — ASSESSMENT & PLAN NOTE
Lab Results   Component Value Date    HGBA1C 4 9 12/28/2019     Recent Labs     01/21/20  1559 01/21/20  2114 01/22/20  0826 01/22/20  1104   POCGLU 130 104 104 125     Blood Sugar Average: Last 72 hrs:  · (P) 116 [id number]   · Continue Lantus 5 units q h s , resume Toujeo at d/c  · SSI, accuchecks, ADA

## 2020-01-23 ENCOUNTER — TRANSITIONAL CARE MANAGEMENT (OUTPATIENT)
Dept: FAMILY MEDICINE CLINIC | Facility: CLINIC | Age: 55
End: 2020-01-23

## 2020-01-23 VITALS
BODY MASS INDEX: 34.15 KG/M2 | SYSTOLIC BLOOD PRESSURE: 144 MMHG | WEIGHT: 217.59 LBS | HEIGHT: 67 IN | OXYGEN SATURATION: 97 % | DIASTOLIC BLOOD PRESSURE: 56 MMHG | RESPIRATION RATE: 18 BRPM | TEMPERATURE: 98 F | HEART RATE: 60 BPM

## 2020-01-23 LAB
ANION GAP SERPL CALCULATED.3IONS-SCNC: 14 MMOL/L (ref 4–13)
BUN SERPL-MCNC: 64 MG/DL (ref 5–25)
CALCIUM SERPL-MCNC: 7.9 MG/DL (ref 8.3–10.1)
CHLORIDE SERPL-SCNC: 106 MMOL/L (ref 100–108)
CO2 SERPL-SCNC: 16 MMOL/L (ref 21–32)
CREAT SERPL-MCNC: 3.81 MG/DL (ref 0.6–1.3)
GFR SERPL CREATININE-BSD FRML MDRD: 13 ML/MIN/1.73SQ M
GLUCOSE SERPL-MCNC: 100 MG/DL (ref 65–140)
GLUCOSE SERPL-MCNC: 102 MG/DL (ref 65–140)
GLUCOSE SERPL-MCNC: 122 MG/DL (ref 65–140)
POTASSIUM SERPL-SCNC: 4.2 MMOL/L (ref 3.5–5.3)
SODIUM SERPL-SCNC: 136 MMOL/L (ref 136–145)

## 2020-01-23 PROCEDURE — 82948 REAGENT STRIP/BLOOD GLUCOSE: CPT

## 2020-01-23 PROCEDURE — 99239 HOSP IP/OBS DSCHRG MGMT >30: CPT | Performed by: INTERNAL MEDICINE

## 2020-01-23 PROCEDURE — 99232 SBSQ HOSP IP/OBS MODERATE 35: CPT | Performed by: INTERNAL MEDICINE

## 2020-01-23 PROCEDURE — 80048 BASIC METABOLIC PNL TOTAL CA: CPT | Performed by: INTERNAL MEDICINE

## 2020-01-23 RX ORDER — SODIUM BICARBONATE 650 MG/1
1950 TABLET ORAL 3 TIMES DAILY
Qty: 180 TABLET | Refills: 0 | Status: SHIPPED | OUTPATIENT
Start: 2020-01-23 | End: 2021-01-01

## 2020-01-23 RX ORDER — AMLODIPINE BESYLATE 10 MG/1
10 TABLET ORAL DAILY
Qty: 30 TABLET | Refills: 0 | Status: SHIPPED | OUTPATIENT
Start: 2020-01-23 | End: 2020-01-01

## 2020-01-23 RX ORDER — HYDRALAZINE HYDROCHLORIDE 100 MG/1
100 TABLET, FILM COATED ORAL 3 TIMES DAILY
Qty: 270 TABLET | Refills: 0 | Status: SHIPPED | OUTPATIENT
Start: 2020-01-23 | End: 2021-01-01

## 2020-01-23 RX ORDER — CEPHALEXIN 500 MG/1
500 CAPSULE ORAL EVERY 8 HOURS SCHEDULED
Qty: 10 CAPSULE | Refills: 0 | Status: SHIPPED | OUTPATIENT
Start: 2020-01-23 | End: 2020-01-26

## 2020-01-23 RX ADMIN — LEVOTHYROXINE SODIUM 125 MCG: 125 TABLET ORAL at 05:21

## 2020-01-23 RX ADMIN — TACROLIMUS 2 MG: 1 CAPSULE ORAL at 08:22

## 2020-01-23 RX ADMIN — SODIUM BICARBONATE 650 MG TABLET 1950 MG: at 14:11

## 2020-01-23 RX ADMIN — METOPROLOL TARTRATE 50 MG: 50 TABLET, FILM COATED ORAL at 08:22

## 2020-01-23 RX ADMIN — CEPHALEXIN 500 MG: 500 CAPSULE ORAL at 14:11

## 2020-01-23 RX ADMIN — SEVELAMER HYDROCHLORIDE 1600 MG: 800 TABLET, FILM COATED PARENTERAL at 14:12

## 2020-01-23 RX ADMIN — FOLIC ACID 1000 MCG: 1 TABLET ORAL at 08:23

## 2020-01-23 RX ADMIN — CLONIDINE HYDROCHLORIDE 0.2 MG: 0.1 TABLET ORAL at 05:21

## 2020-01-23 RX ADMIN — ASPIRIN 81 MG 81 MG: 81 TABLET ORAL at 08:22

## 2020-01-23 RX ADMIN — ROPINIROLE HYDROCHLORIDE 0.25 MG: 0.25 TABLET, FILM COATED ORAL at 08:22

## 2020-01-23 RX ADMIN — TORSEMIDE 20 MG: 20 TABLET ORAL at 08:23

## 2020-01-23 RX ADMIN — PREDNISONE 5 MG: 5 TABLET ORAL at 08:23

## 2020-01-23 RX ADMIN — AMLODIPINE BESYLATE 10 MG: 10 TABLET ORAL at 08:23

## 2020-01-23 RX ADMIN — CEPHALEXIN 500 MG: 500 CAPSULE ORAL at 05:21

## 2020-01-23 RX ADMIN — DULOXETINE HYDROCHLORIDE 60 MG: 60 CAPSULE, DELAYED RELEASE ORAL at 08:22

## 2020-01-23 RX ADMIN — SODIUM BICARBONATE 650 MG TABLET 1950 MG: at 08:22

## 2020-01-23 RX ADMIN — SEVELAMER HYDROCHLORIDE 1600 MG: 800 TABLET, FILM COATED PARENTERAL at 08:23

## 2020-01-23 RX ADMIN — CLONIDINE HYDROCHLORIDE 0.2 MG: 0.1 TABLET ORAL at 14:09

## 2020-01-23 RX ADMIN — FERROUS SULFATE TAB 325 MG (65 MG ELEMENTAL FE) 325 MG: 325 (65 FE) TAB at 08:22

## 2020-01-23 RX ADMIN — MELATONIN 3000 UNITS: at 08:22

## 2020-01-23 RX ADMIN — SERTRALINE HYDROCHLORIDE 200 MG: 100 TABLET ORAL at 08:23

## 2020-01-23 RX ADMIN — SENNOSIDES 8.6 MG: 8.6 TABLET, FILM COATED ORAL at 08:22

## 2020-01-23 RX ADMIN — HYDRALAZINE HYDROCHLORIDE 100 MG: 25 TABLET ORAL at 08:24

## 2020-01-23 RX ADMIN — BUSPIRONE HYDROCHLORIDE 5 MG: 5 TABLET ORAL at 08:22

## 2020-01-23 RX ADMIN — DOCUSATE SODIUM 100 MG: 100 CAPSULE, LIQUID FILLED ORAL at 08:23

## 2020-01-23 NOTE — ASSESSMENT & PLAN NOTE
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

## 2020-01-23 NOTE — ASSESSMENT & PLAN NOTE
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 and follows Dr Charis Pallas  She is status post kidney and pancreas transplantation 1998 on immuno suppression therapy with prednisone 5 mg daily, tacrolimus 2 mg b i d   · Presented to hospital at the request of her outpatient nephrologist due to worsening creatinine and anemia    Creatinine has been gradually improving during hospitalization  Creatinine steadily improving and is at 3 81 today  Ultrasound did not show any hydronephrosis or masses of patient's transplanted kidney  Both renal artery and renal vein were patent  Nephrology notes reviewed  Viral testing ordered including EBV, CMV and BK virus pending on discharge  Continue to monitor renal functions closely  She will get BMP as an outpatient and will continue to follow up with Nephrology

## 2020-01-23 NOTE — DISCHARGE INSTRUCTIONS
Acute Kidney Injury (EDUARDO)  You have been diagnosed with Acute Kidney Injury (EDUARDO)  The following information has been developed to provide you with information about EDUARDO and treatment  What is Acute Kidney Injury (EDUARDO)? EDUARDO occurs when kidney function decreases over a short period of time  This condition causes a buildup of waste products in the blood and can cause fluid to build up causing swelling in the legs and shortness of breath  Sometimes called Acute Kidney Failure or Acute Renal Failure, EDUARDO is often reversible if it is found and treated quickly  How do you know if you have EDUARDO? EDUARDO is diagnosed by assessing kidney function  This is done by obtaining a blood test to measure the blood level of creatinine  Decreased urine output can sometimes also indicate EDUARDO  Who is at risk for EDUARDO? EDUARDO can happen to anyone but usually happens to people who are already sick and may be in the hospital  People are at higher risk for EDUARDO if they have any of the following:   age 72 years or older   high or low blood pressure   underlying kidney disease (e g , Chronic Kidney Disease (CKD)   peripheral vascular disease (hardening of arteries)   chronic diseases such as liver disease, heart disease and diabetes   a single kidney    What are the symptoms of EDUARDO? You may or may not have the symptoms to suggest you have kidney injury until the EDUARDO has progressed  Some of the symptoms are listed below:   Not making enough urine   Increased swelling in legs    Feeling tired   Trouble breathing or shortness of breath   Nausea   New or worsening confusion    What causes EDUARDO?   The causes are divided into three categories:   Not enough blood flowing to the kidneys (e g , low blood pressure, bleeding, diarrhea, dehydration)   Injury directly to the kidneys (e g , blood clots, severe infections such as sepsis, medicine toxicity, IV contrast dye used for cardiac catheterization or CT scans)   Blockage to the tubes (ureters) that drain the urine from the kidneys (e g , enlarged prostate, kidney stones, blood clots)    What is the treatment for EDUARDO? The treatment for EDUARDO depends on correcting what caused it  Treatment usually involves removing the cause and measures to prevent further injury to the kidneys  This may require the use of intravenous fluids or medications and/or temporary dialysis  Dialysis is a process using a machine that does the job of the kidneys to remove waste and help correct the electrolyte and fluid balance while the kidneys are recovering  If dialysis is needed to treat EDUARDO, the doctor will assess daily to see if the kidneys are showing signs of recovery  The daily assessments determine how long dialysis needs to continue  Depending on the cause and the extent of damage, an episode of EDUARDO may resolve in a few days to several weeks to several months  What are the long term effects of having an episode of EDUARDO?  People who have one episode of EDUARDO are at an increased risk of having another episode of EDUARDO as well as other health problems such as kidney disease, stroke, and heart disease   In a small number of people who had unrecognized kidney disease, an EDUARDO episode may result in Chronic Kidney Disease (CKD) which requires lifelong monitoring and treatment  How do you prevent future episodes of EDUARDO?  Make sure to follow up with the kidney doctor after hospital discharge and obtain blood work to reassess and monitor kidney function     If you have diabetes, keep your blood sugar in goal range and keep appointments with your diabetes specialist    [place] Elliott If you have high blood pressure, have your blood pressure checked regularly to make sure it is in target range  o If you take blood pressure medicine called ACEIs or ARBs (e g , Lisinopril, Enalapril, Diovan, Losartan), your doctor may tell you to skip a dose or two if you have severe dehydration and your blood pressure is running low    Avoid using medicines such as NSAIDs (Nonsteroidal Anti-inflammatory Drugs) and Byrd-2 Inhibitors (a type of NSAID) that may be harmful to kidney function  These may include the medicines listed in the table that follows  Examples of NSAIDS and Byrd-2 Inhibitors   Talk to your doctor or healthcare provider before stopping any medicine ordered for you  Celecoxib (CELEBREX) Ketoprofen (ORUDIS, ORUVAIL)   Diclofenac (VOLTAREN, CATAFLAM) Ketorolac (TORADOL)   Diflunisal (DOLOBID) Meloxicam (MOBIC)   Etodolac (LODINE) Nabumetone (RELAFEN)   Fenoprofen (NALFON) Naproxen (ALEVE, NAPROSYN,    NAPRELAN, ANAPROX)   Flurbiprofen (ANSAID) Oxaprozin (DAYPRO)   Ibuprofen (MOTRIN, ADVIL) Piroxicam (FELDENE)   Indomethacin (INDOCIN) Sulindac (CLINORIL)    Tolmetin ([place])     Is there a special diet for people with EDUARDO? People with EDUARDO and/or other kidney disease often have high potassium and phosphorus levels in their blood  To protect the kidneys from further injury and to avoid complications, most doctors recommend following a healthy diet choosing foods low potassium and low phosphorus   Limiting dietary potassium to 2 5 grams/day and phosphorus to 800 milligrams/day is recommended   A dietitian can help you with learning more about this type of diet   The tables on the following page may help you to choose lower potassium and phosphorus foods  The following websites are also good sources of information:  [place]anna at  org/nutrition/Kidney-Disease-Stages-1-4   [employer]   https://www niddk nih gov/health-information/kidney-disease/chronic-kidney-disease-ckd/eating-nutrition  https://[facility] org/health/articles/15641-renal-diet-basics  [employer] com cy    If you have any questions or concerns about your condition, please contact your doctor or healthcare provider    These tables may help you to choose lower potassium and phosphorus foods    AVOID these higher phosphorus* foods: CHOOSE these lower phosphorus* foods:   Milk, pudding , yogurt made from animals and from many soy varieties Rice milk (unfortified), nondairy creamer   Hard cheeses, ricotta, cottage cheese, fat-free cream cheese Regular and low-fat cream cheese   Ice cream, frozen yogurt Sherbet, frozen fruit pops, sorbet   Soups made with milk, dried peas, beans, lentils or other high phosphorus ingredients Soups made with broth, are water-based, or other lower phosphorus ingredients   Whole grains, including whole-grain breads, crackers, cereal, rice and pasta, corn tortillas Refined grains, including white bread, crackers, cereals, rice and pasta   Quick breads, biscuits, cornbread, muffins, pancakes, waffles, granola, wheat germ Homemade refined (white) dinner rolls, bagels, English muffins, sugar cookies, shortbread cookies, jovan food cake   Dried peas (split, black-eyed), beans (black, garbanzo, lima, kidney, navy, bundy), lentils Green peas (canned, frozen), green beans, wax beans   Organ meats, walleye, [place], sardines Lean beef, pork, lamb, poultry, other fish   Nuts and seeds Popcorn   Peanut butter, other nut butters; tofu, veggie or soy burgers Jam, jelly, honey   Chocolate, including chocolate drinks Carob (chocolate-flavored) candy, hard candy,  gumdrops   Armando, pepper-type sodas, flavored mancini, bottled teas, beer Lemon-lime soda, ginger ale or root beer, plain water, cream soda, grape soda   *Always read labels and avoid foods with ingredients containing "phos"  AVOID these higher potassium foods: CHOOSE these lower potassium foods:      Milk (fat free, low fat, whole, buttermilk, Soy), yogurt    Regular and low-fat cream cheese      Beans (white, [place]), [place] sprouts, spinach Swiss       chard, broccoli, avocado, artichoke, potatoes, sweet      potatoes, tomatoes/tomato sauce, beet greens      Green beans, alfalfa sprouts, bamboo shoots (canned),       cabbage, carrots, cauliflower, corn, cucumber, eggplant,      endive, lettuce, mushrooms, onions, radishes,  watercress,       water chestnuts (canned), rice, peas      Halibut, tuna, cod, snapper, tuna fish, turkey    Egg, lean beef, pork, lamb, shellfish, chicken       Banana, papaya, orange, cantaloupe, dates, raisins and      other dried fruit, pomegranate, avocado      Apple, applesauce, blackberries, raspberries, pears,     watermelon, cucumbers, blueberries, cranberries,       peaches      Almonds, peanuts, hazelnuts, [place], cashew, mixed,      seeds (sunflower, pumpkin)     Homemade refined (white) dinner rolls, bagels,      English muffins, flour tortilla, crackers, travis      crackers, popcorn, pretzels, spaghetti or macaroni,       hummus      Tomato or vegetable juice, prune juice    Papaya, cheli, or pear nectar, cranberry juice cocktail      Molasses

## 2020-01-23 NOTE — DISCHARGE SUMMARY
Discharge- Jaye Stark 1965, 53 y o  female MRN: [id number]    Unit/Bed#: S -01 Encounter: [id number]    Primary Care Provider: Esequiel Avitia MD   Date and time admitted to hospital: 1/17/2020  2:09 PM        * Anemia  Assessment & Plan  · Patient admitted with hemoglobin 6 5  Baseline hemoglobin mid 7 range with known his anemia of chronic disease and multiple myeloma  · Most likely due to chronic kidney disease  ·   · Patient has warm auto antibody but tolerated 2 units of packed red blood cells on 01/18  Hemoglobin malia from 5 8 up to 8 3 yesterday  Today it is 7 8  Outpatient nephrology and hematology follow-up    Acute renal failure superimposed on stage 4 chronic kidney disease ([facility] [address] )  Assessment & Plan  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 and follows Dr Nikia Real  She is status post kidney and pancreas transplantation 1998 on immuno suppression therapy with prednisone 5 mg daily, tacrolimus 2 mg b i d   · Presented to hospital at the request of her outpatient nephrologist due to worsening creatinine and anemia    Creatinine has been gradually improving during hospitalization  Creatinine steadily improving and is at 3 81 today  Ultrasound did not show any hydronephrosis or masses of patient's transplanted kidney  Both renal artery and renal vein were patent  Nephrology notes reviewed  Viral testing ordered including EBV, CMV and BK virus pending on discharge  Continue to monitor renal functions closely  She will get BMP as an outpatient and will continue to follow up with Nephrology       Asymptomatic bacteriuria  Assessment & Plan  · Urine culture positive for gram negative rods, ID following   · Known chronic bacteriuria with intermittent dysuria     Patient did start having some UTI symptoms  Daptomycin was switched to Rocephin  She currently on Keflex to complete 7 days of treatment through 1/26  Id following  Patient was recommended self catheterization for possible episodes of urinary retention  Patient however has 0 postvoid residual   She refuses to self-catheterize     Continue with outpatient Urology follow-up  Multiple myeloma not having achieved remission [facility])  Assessment & Plan  · Follows with Dr Shawn Denson for known history of IgG kappa multiple myeloma stage III diagnosed in April 2019 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  · Currently off Revlimid due to frequent readmissions to the hospital for volume overload, constipation/fecal impaction/colitis, urinary tract infection  · Recommended outpatient Oncology follow-up on discharge    Anxiety  Assessment & Plan  · Continue Cymbalta, Abilify, BuSpar, Requip, Zoloft, prn ativan     Atrial fibrillation (HCC)  Assessment & Plan  · Continue lopressor     Chronic constipation  Assessment & Plan  · Evaluated by Gastroenterology last admission for abdominal pain, constipation and fecal stasis  · Reports frequent diarrhea at home while on bowel regimen but now constipated  · Monitor stool output and treat diarrhea versus constipation accordingly    Outpatient colonoscopy recommended    Chronic diastolic congestive heart failure (HCC)  Assessment & Plan  Wt Readings from Last 3 Encounters:   01/23/20 98 7 kg (217 lb 9 5 oz)   01/17/20 99 3 kg (219 lb)   01/08/20 100 kg (221 lb)   · Echocardiogram most recently with normal ejection fraction, grade 1 diastolic dysfunction with no wall motion abnormalities, moderate aortic regurgitation and trace pericardial effusion  · Last admission patient found to be in acute on chronic diastolic heart failure and was diuresed by Nephrology and received IV albumin and switch to p o  Torsemide  · Renal resumed torsemide 1/18    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 [facility])  Assessment & Plan  Lab Results   Component Value Date    HGBA1C 4 9 12/28/2019     Recent Labs     01/22/20  1554 01/22/20  2052 01/23/20  0802 01/23/20  1148   POCGLU 163* 87 102 122     Blood Sugar Average: Last 72 hrs:  · (P) 117 [id number]   · Continue Lantus 5 units q h s , resume Toujeo at d/c  · SSI, accuchecks, ADA     Benign hypertension with CKD (chronic kidney disease) stage IV (HCC)  Assessment & Plan  · Home regimen includes clonidine 0 2 mg t i d , hydralazine 100mg Q 8, Cardura 20 mg at bedtime, Lopressor 50 mg twice daily, Norvasc 10 mg daily with hold parameters  · Hold Aldactone 25 mg daily   · Torsemide 20 mg resumed 1/18  · Avoid hypotension    Acquired hypothyroidism  Assessment & Plan  · Continue Synthroid 125mcg qd        Discharging Physician / Practitioner: Andi Wilkes MD  PCP: Rosalee Ruiz MD  Admission Date:   Admission Orders (From admission, onward)     Ordered        01/17/20 1514  Inpatient Admission  Once                   Discharge Date: 01/23/20    Resolved Problems  Date Reviewed: 1/18/2020          Resolved    Acid reflux disease 1/22/2020     Resolved by  Andi Wilkes MD          Consultations During Hospital Stay:  · Nephrology  · Infectious Disease    Procedures Performed:   Kidney ultrasound:Transplant kidney without hydronephrosis or suspicious mass  Resistive indices are slightly elevated within the upper pole; however, renal vein and artery are patent  2  Marked atrophy of native kidneys with no definitive renal parenchyma seen within the left renal fossa    No hydronephrosis or suspicious masses    Significant Findings / Test Results:   · Blood transfusion with 2 units of packed RBCs    Incidental Findings:   · none    Test Results Pending at Discharge (will require follow up):   · NA     Outpatient Tests Requested:  · BMP in 1-2 weeks    Complications:  None    Reason for Admission:  Low blood count  Hospital Course: Maryann Deleon is a 53 y o  female patient who originally presented to the hospital on 1/17/2020 due to anemia requiring blood transfusion  Thought to be multifactorial secondary to underlying multiple myeloma and chronic kidney disease  She did not have any active overt bleeding  He was also found to have acute kidney injury on chronic kidney disease for which nephrology continued to follow her and adjusted her medications  Her antihypertensive regimen was adjusted and her diuretics were initially put on hold and will gradually resume  Her Aldactone was discontinued at the time of discharge  She was recommended outpatient nephrology follow-up  She was deemed stable for discharged in a stable condition  She was also treated for underlying urinary tract infection and was discharged on Keflex to complete total of 7 days of treatment  Please see above list of diagnoses and related plan for additional information  Condition at Discharge: stable     Discharge Day Visit / Exam:     Subjective:  Patient denies any pain or discomfort  Tolerating p o  Without nausea vomiting or abdominal discomfort  Denies any constipation or diarrhea  Vitals: Blood Pressure: 152/50 (01/23/20 0804)  Pulse: 60 (01/23/20 0804)  Temperature: 98 °F (36 7 °C) (01/23/20 0804)  Temp Source: Oral (01/23/20 0804)  Respirations: 18 (01/23/20 0804)  Height: 5' 7" (170 2 cm) (01/17/20 1606)  Weight - Scale: 98 7 kg (217 lb 9 5 oz) (01/23/20 0600)  SpO2: 97 % (01/23/20 0804)  Exam:   Physical Exam   Constitutional: She is oriented to person, place, and time  No distress  HENT:   Head: Normocephalic and atraumatic  Mouth/Throat: Oropharynx is clear and moist    Eyes: Pupils are equal, round, and reactive to light  EOM are normal    Neck: Normal range of motion  Cardiovascular: Normal rate and regular rhythm     Pulmonary/Chest: Effort normal and breath sounds normal    Abdominal: Soft  Bowel sounds are normal    Musculoskeletal: She exhibits no edema  Neurological: She is alert and oriented to person, place, and time  Skin: Skin is warm and dry  She is not diaphoretic  There is pallor  Psychiatric: She has a normal mood and affect  Discussion with Family:  Patient refused updating her dad  Discharge instructions/Information to patient and family:   See after visit summary for information provided to patient and family  Provisions for Follow-Up Care:  See after visit summary for information related to follow-up care and any pertinent home health orders  Disposition:     Home    For Discharges to [facility] SNF:   · Not Applicable to this Patient - Not Applicable to this Patient    Planned Readmission:no   Discharge Statement:  I spent 40 minutes discharging the patient  This time was spent on the day of discharge  I had direct contact with the patient on the day of discharge  Greater than 50% of the total time was spent examining patient, answering all patient questions, arranging and discussing plan of care with patient as well as directly providing post-discharge instructions  Additional time then spent on discharge activities  Discharge Medications:  See after visit summary for reconciled discharge medications provided to patient and family        ** Please Note: This note has been constructed using a voice recognition system **

## 2020-01-23 NOTE — ASSESSMENT & PLAN NOTE
Lab Results   Component Value Date    HGBA1C 4 9 12/28/2019     Recent Labs     01/22/20  1554 01/22/20 2052 01/23/20  0802 01/23/20  1148   POCGLU 163* 87 102 122     Blood Sugar Average: Last 72 hrs:  · (P) 117 [id number]   · Continue Lantus 5 units q h s , resume Toujeo at d/c  · SSI, accuchecks, ADA

## 2020-01-23 NOTE — ASSESSMENT & PLAN NOTE
· Patient admitted with hemoglobin 6 5  Baseline hemoglobin mid 7 range with known his anemia of chronic disease and multiple myeloma  · Most likely due to chronic kidney disease  ·   · Patient has warm auto antibody but tolerated 2 units of packed red blood cells on 01/18  Hemoglobin malia from 5 8 up to 8 3 yesterday  Today it is 7 8    Outpatient nephrology and hematology follow-up

## 2020-01-23 NOTE — ASSESSMENT & PLAN NOTE
Wt Readings from Last 3 Encounters:   01/23/20 98 7 kg (217 lb 9 5 oz)   01/17/20 99 3 kg (219 lb)   01/08/20 100 kg (221 lb)   · Echocardiogram most recently with normal ejection fraction, grade 1 diastolic dysfunction with no wall motion abnormalities, moderate aortic regurgitation and trace pericardial effusion  · Last admission patient found to be in acute on chronic diastolic heart failure and was diuresed by Nephrology and received IV albumin and switch to p o   Torsemide  · Renal resumed torsemide 1/18

## 2020-01-23 NOTE — PLAN OF CARE
Problem: Potential for Falls  Goal: Patient will remain free of falls  Description  INTERVENTIONS:  - Assess patient frequently for physical needs  -  Identify cognitive and physical deficits and behaviors that affect risk of falls    -  [place] fall precautions as indicated by assessment   - Educate patient/family on patient safety including physical limitations  - Instruct patient to call for assistance with activity based on assessment  - Modify environment to reduce risk of injury  - Consider OT/PT consult to assist with strengthening/mobility  Outcome: Progressing     Problem: PAIN - ADULT  Goal: Verbalizes/displays adequate comfort level or baseline comfort level  Description  Interventions:  - Encourage patient to monitor pain and request assistance  - Assess pain using appropriate pain scale  - Administer analgesics based on type and severity of pain and evaluate response  - Implement non-pharmacological measures as appropriate and evaluate response  - Consider cultural and social influences on pain and pain management  - Notify physician/advanced practitioner if interventions unsuccessful or patient reports new pain  Outcome: Progressing     Problem: INFECTION - ADULT  Goal: Absence or prevention of progression during hospitalization  Description  INTERVENTIONS:  - Assess and monitor for signs and symptoms of infection  - Monitor lab/diagnostic results  - Monitor all insertion sites, i e  indwelling lines, tubes, and drains  - Monitor endotracheal if appropriate and nasal secretions for changes in amount and color  - [place] appropriate cooling/warming therapies per order  - Administer medications as ordered  - Instruct and encourage patient and family to use good hand hygiene technique  - Identify and instruct in appropriate isolation precautions for identified infection/condition  Outcome: Progressing  Goal: Absence of fever/infection during neutropenic period  Description  INTERVENTIONS:  - Monitor WBC    Outcome: Progressing     Problem: SAFETY ADULT  Goal: Maintain or return to baseline ADL function  Description  INTERVENTIONS:  -  Assess patient's ability to carry out ADLs; assess patient's baseline for ADL function and identify physical deficits which impact ability to perform ADLs (bathing, care of mouth/teeth, toileting, grooming, dressing, etc )  - Assess/evaluate cause of self-care deficits   - Assess range of motion  - Assess patient's mobility; develop plan if impaired  - Assess patient's need for assistive devices and provide as appropriate  - Encourage maximum independence but intervene and supervise when necessary  - Involve family in performance of ADLs  - Assess for home care needs following discharge   - Consider OT consult to assist with ADL evaluation and planning for discharge  - Provide patient education as appropriate  Outcome: Progressing  Goal: Maintain or return mobility status to optimal level  Description  INTERVENTIONS:  - Assess patient's baseline mobility status (ambulation, transfers, stairs, etc )    - Identify cognitive and physical deficits and behaviors that affect mobility  - Identify mobility aids required to assist with transfers and/or ambulation (gait belt, sit-to-stand, lift, walker, cane, etc )  - [place] fall precautions as indicated by assessment  - Record patient progress and toleration of activity level on Mobility SBAR; progress patient to next Phase/Stage  - Instruct patient to call for assistance with activity based on assessment  - Consider rehabilitation consult to assist with strengthening/weightbearing, etc   Outcome: Progressing     Problem: DISCHARGE PLANNING  Goal: Discharge to home or other facility with appropriate resources  Description  INTERVENTIONS:  - Identify barriers to discharge w/patient and caregiver  - Arrange for needed discharge resources and transportation as appropriate  - Identify discharge learning needs (meds, wound care, etc )  - Arrange for interpretive services to assist at discharge as needed  - Refer to Case Management Department for coordinating discharge planning if the patient needs post-hospital services based on physician/advanced practitioner order or complex needs related to functional status, cognitive ability, or social support system  Outcome: Progressing     Problem: Knowledge Deficit  Goal: Patient/family/caregiver demonstrates understanding of disease process, treatment plan, medications, and discharge instructions  Description  Complete learning assessment and assess knowledge base    Interventions:  - Provide teaching at level of understanding  - Provide teaching via preferred learning methods  Outcome: Progressing

## 2020-01-23 NOTE — ASSESSMENT & PLAN NOTE
· Home regimen includes clonidine 0 2 mg t i d , hydralazine 100mg Q 8, Cardura 20 mg at bedtime, Lopressor 50 mg twice daily, Norvasc 10 mg daily with hold parameters  · Hold Aldactone 25 mg daily   · Torsemide 20 mg resumed 1/18  · Avoid hypotension

## 2020-01-23 NOTE — PROGRESS NOTES
Progress Note - Infectious Disease   Scottie Hernandez 53 y o  female MRN: [id number]  Unit/Bed#: S -01 Encounter: [id number]      Impression/Plan:  1  Symptomatic UTI  Andreia Elliott has history of bladder colonization with MDR pathogens, most recently VRE   Her UA is grossly abnormal and she is now growing E  Coli sensitive to cefazolin   Dysuria is persistent at end of urine stream yet  RN reports PVR last night was 61 mL   0 mL PVRs also charted in computer on  and 20  Continues Keflex 500 mg p o  every 8 hours to complete a 7 day course total through 2020  Monitor urinary symptoms  Monitor temperature/WBC      2  EDUARDO, superimposed on CKD   Creatinine has slowly improving: 3 85 today  Antibiotic dosages adjusted accordingly to q 8 hour dosing  Monitor creatinine  Nephrology following closely and will continue with close nephrology f/u as outpatient upon discharge as well      3  Status post renal transplant   No evidence of pyelonephritis of transplanted kidney      4  Multiple myeloma   Patient had been on Revlimid previously but on hold due to multiple admissions for acute illnesses recently      Discussed with patient and medical team in detail regarding the above plan     Antibiotics:  Cephalexin abx D4     Subjective:  Patient reports she is tired today  She slept well last night  She does not have dysuria at the end of urination today  Her creatinine number is down to 3 81  She has no focal abdominal or flank pain  No LLQT over transplant kidney site  Temperature stays down   No chills  She is tolerating antibiotic well   No nausea, vomiting      Objective:  Vitals:  Temp:  [97 5 °F (36 4 °C)-98 1 °F (36 7 °C)] 98 °F (36 7 °C)  HR:  [58-62] 60  Resp:  [18] 18  BP: (142-164)/(50-72) 152/50  SpO2:  [94 %-97 %] 97 %  Temp (24hrs), Av 9 °F (36 6 °C), Min:97 5 °F (36 4 °C), Max:98 1 °F (36 7 °C)  Current: Temperature: 98 °F (36 7 °C)    General Appearance:  Awake, alert, cooperative, resting in bed, no acute distress  Throat: Oropharynx moist without lesions  Lungs:   Clear to auscultation bilaterally; no wheezes, rhonchi or rales; respirations unlabored   Heart:  RRR; S1-S2 heard, no murmur   Abdomen:   Soft, non-tender, non-distended, positive bowel sounds  Extremities: No edema, arm IV site nontender   : No [place] catheter, suprapubic tenderness, no left lower quadrant tenderness over the transplant kidney site   Skin: No rashes      Labs, Imaging, & Other studies:   All pertinent labs and imaging studies were personally reviewed  Results from last 7 days   Lab Units 01/22/20  0556 01/21/20  0440 01/20/20  0628   WBC Thousand/uL 7 17 7 98 8 53   HEMOGLOBIN g/dL 7 8* 7 7* 7 9*   PLATELETS Thousands/uL 163 160 154     Results from last 7 days   Lab Units 01/23/20  0622  01/17/20  0758   POTASSIUM mmol/L 4 2   < > 4 9   CHLORIDE mmol/L 106   < > 112*   CO2 mmol/L 16*   < > 18*   BUN mg/dL 64*   < > 49*   CREATININE mg/dL 3 81*   < > 4 19*   EGFR ml/min/1 73sq m 13   < > 11   CALCIUM mg/dL 7 9*   < > 8 4   AST U/L  --   --  7   ALT U/L  --   --  14   ALK PHOS U/L  --   --  95    < > = values in this interval not displayed           Results from last 7 days   Lab Units 01/17/20  2019   URINE CULTURE  50,000-59,000 cfu/ml Escherichia coli*  10,000-19,000 cfu/ml

## 2020-01-23 NOTE — SOCIAL WORK
Pt is cleared for discharge to home today  IMM reviewed with pt and she voiced understanding  Copy provided   SLIM aware for Ambulatory referral for outpt PT

## 2020-01-23 NOTE — DISCHARGE INSTR - AVS FIRST PAGE
Dear Sai Dorsey,     It was our pleasure to care for you here at [facility]  It is our hope that we were always able to exceed the expected standards for your care during your stay  You were hospitalized due to worsening kidney functions and urinary tract infection  You were cared for on the medical floor under the service of Joy Botello MD with the [facility] who covers for your primary care physician (PCP), Dimitry Elaine MD, while you were hospitalized  If you have any questions or concerns related to this hospitalization, you may contact us at [phone]  For follow up as well as medication refills, we recommend that you follow up with your primary care physician  A registered nurse will reach out to you by phone within a few days after your discharge to answer any additional questions that you may have after going home  However, at this time we provide for you here, the most important instructions / recommendations at discharge:     · Notable Medication Adjustments -   · Your Aldactone will be put on hold  · Please complete antibiotics through 1/26  · Testing Required after Discharge -   · Blood work in 1 week  · Important follow up information -   · Follow up with the kidney doctors in 1-2 weeks  · Other Instructions -   ·   · Please review this entire after visit summary as additional general instructions including medication list, appointments, activity, diet, any pertinent wound care, and other additional recommendations from your care team that may be provided for you        Sincerely,     Joy Botello MD

## 2020-01-23 NOTE — PROGRESS NOTES
NEPHROLOGY PROGRESS NOTE   Georgette Peng 53 y o  female MRN: [id number]  Unit/Bed#: S -01 Encounter: [id number]    ASSESSMENT & PLAN:  50-year-old female with known history of chronic kidney disease stage 4 with baseline creatinine 2 5-3 0, admitted for elevated creatinine  · Acute kidney injury, POA  · Admission creatinine was 4 42 on 1/17  · Acute kidney injury likely prerenal secondary to severe anemia on admission and possibly mild volume depletion from diuretics  Admission hemoglobin was 6 5 and dropped to 5 8 on 01/18  Also has UTI which could be contributing  Patient was also recently restarted in January on outpatient Revlimid for Multiple myeloma by Hematology Oncology which could also contribute to elevated creatinine  · Continue to monitor renal function  Avoid nephrotoxins  · Bladder scans were negative  · Renal ultrasound did not show any hydronephrosis in the transplanted kidney  · UA with urine microscopy showed 2+ protein, numerous WBC and RBC  · Renal function today improving to creatinine 3 81 on 1/23/20  Will repeat BMP coming Monday to monitor renal function- order placed  · Follow-up viral panels-CMV, EBV and BK virus testing- still pending  ·  Stable for outpatient care from Nephrology side  Will arrange for outpatient follow-up with an advanced practitioner in 7-10 days per [facility]  Protocol  · Chronic kidney disease stage 4:  Baseline creatinine 2 5-3 0 recently,  Follows with Dr Elizabeth Ames  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  · Status post kidney pancreas transplant in 1998, on immunosuppressive medication:  Prednisone 5 mg daily, tacrolimus 2 mg every 12 hours  Last Prograf level was 4 9 on 01/08  · Anemia:    · Status post 2 units PRBC    Admission hemoglobin was 6 5 and trended down to 5 8 on 01/18  · Continue monitor hemoglobin per primary team    · Anemia likely multifactorial:  Multiple myeloma, chemotherapy, CKD  Iron stores showed iron saturation 30%  · On oral iron tablets   · May consider PRBC if hemoglobin drops below 7 0  Hb stable at 7 8    · Hyperphosphatemia  · Phosphorus level was 5 3 on 01/18  · Continue phosphorus binders-sevelamer  · Primary hypertension with chronic kidney disease:  · BP  Better controlled today with increased dose of hydralazine  · Continue torsemide at current dose  Continue hydralazine to 100 mg tid  · Continue holding aldactone with recent worsening of renal function and risk of hyperkalemia   ·   Continue other antihypertensive medication at home dose  · Metabolic acidosis:  Bicarbonate level low at 16  Continue sodium bicarbonate, increased dose to 1950 mg tid with meals  Would monitor as outpatient  Not considering bicarb drip due to risk of fluid overload  · UTI, E  Coli:  Currently on Keflex-  Continue antibiotic per ID  · Multiple myeloma:  Management per Hematology Oncology  Currently of Revlimid  Would recommend close monitoring of renal function if started back on Revlimid  Discussed with Dr Trang Morales  Would arrange for outpatient follow-up after discharge- office informed  repeat BMP in 4-5 days  Continue holding Aldactone  Increase hydralazine to 100 mg t i d   Continue torsemide at current dose  Continue immunosuppressive medication at same dose  SUBJECTIVE:   denies any new complaints  No shortness of breath and no chest pain    OBJECTIVE:  Current Weight: Weight - Scale: 98 7 kg (217 lb 9 5 oz)  Vitals:    01/23/20 0804   BP: 152/50   Pulse: 60   Resp: 18   Temp: 98 °F (36 7 °C)   SpO2: 97%       Intake/Output Summary (Last 24 hours) at 1/23/2020 2797  Last data filed at 1/22/2020 1900  Gross per 24 hour   Intake 360 ml   Output    Net 360 ml       Physical Exam  General:  Ill looking, awake    Eyes: Conjunctivae pink,  Sclera anicteric  ENT: lips and mucous membranes moist  Neck: supple   Chest: Clear to Auscultation both lungs, no crackles, ronchus or wheezing  CVS: S1 & S2 present, normal rate, regular rhythm, no murmur    Abdomen: soft, non-tender, non-distended, Bowel sounds normoactive  Extremities: no edema of  legs  Skin: no rash  Neuro: awake, alert, oriented        Medications:    Current Facility-Administered Medications:     acetaminophen (TYLENOL) tablet 650 mg, 650 mg, Oral, Q6H PRN, Praful Aden PA-C, 650 mg at 01/21/20 0358    amLODIPine (NORVASC) tablet 10 mg, 10 mg, Oral, Daily, Praful Aden PA-C, 10 mg at 01/23/20 0734    ARIPiprazole (ABILIFY) tablet 30 mg, 30 mg, Oral, HS, Praful Aden PA-C, 30 mg at 01/22/20 2211    aspirin chewable tablet 81 mg, 81 mg, Oral, Daily, Praful Aden PA-C, 81 mg at 01/23/20 3758    bisacodyl (DULCOLAX) rectal suppository 10 mg, 10 mg, Rectal, Daily PRN, Dov Field MD, 10 mg at 01/22/20 1635    busPIRone (BUSPAR) tablet 5 mg, 5 mg, Oral, BID, Praful Aden PA-C, 5 mg at 01/23/20 8863    cephalexin (KEFLEX) capsule 500 mg, 500 mg, Oral, Q8H [facility], Leonard Alba MD, 500 mg at 01/23/20 0521    cholecalciferol (VITAMIN D3) tablet 3,000 Units, 3,000 Units, Oral, Daily, Praful Aden PA-C, 3,000 Units at 01/23/20 1802    cloNIDine (CATAPRES) tablet 0 2 mg, 0 2 mg, Oral, Q8H [facility], Praful Aden PA-C, 0 2 mg at 01/23/20 0521    diphenhydrAMINE (BENADRYL) injection 25 mg, 25 mg, Intravenous, Q6H PRN, Praful Aden PA-C, Stopped at 01/18/20 1700    docusate sodium (COLACE) capsule 100 mg, 100 mg, Oral, BID, Praful Aden PA-C, 100 mg at 01/23/20 3626    doxazosin (CARDURA) tablet 2 mg, 2 mg, Oral, HS, Praful Aden PA-C, 2 mg at 01/22/20 2206    DULoxetine (CYMBALTA) delayed release capsule 60 mg, 60 mg, Oral, BID, Praful Aden PA-C, 60 mg at 01/23/20 6728    ferrous sulfate tablet 325 mg, 325 mg, Oral, Daily With Breakfast, Praful Aden PA-C, 325 mg at 27/74/67 0960    folic acid (FOLVITE) tablet 1,000 mcg, 1,000 mcg, Oral, Daily, Praful Aden PA-C, 1,000 mcg at 01/23/20 4459    hydrALAZINE (APRESOLINE) tablet 100 mg, 100 mg, Oral, TID, Ovi Mast MD, 100 mg at 01/23/20 0824    insulin glargine (LANTUS) subcutaneous injection 4 Units 0 04 mL, 4 Units, Subcutaneous, HS, Mavis Barfield MD, 4 Units at 01/22/20 2207    insulin lispro (HumaLOG) 100 units/mL subcutaneous injection 1-5 Units, 1-5 Units, Subcutaneous, HS, Praful Aden PA-C    insulin lispro (HumaLOG) 100 units/mL subcutaneous injection 1-5 Units, 1-5 Units, Subcutaneous, TID AC, 1 Units at 01/22/20 1704 **AND** Fingerstick Glucose (POCT), , , TID AC, Praful Aden PA-C    levothyroxine tablet 125 mcg, 125 mcg, Oral, Daily, Praful Aden PA-C, 125 mcg at 01/23/20 0521    LORazepam (ATIVAN) tablet 2 mg, 2 mg, Oral, TID PRN, Crow Aden PA-C    metoprolol tartrate (LOPRESSOR) tablet 50 mg, 50 mg, Oral, Q12H [facility], Birdie Monae MD, 50 mg at 01/23/20 4390    polyethylene glycol (MIRALAX) packet 17 g, 17 g, Oral, Daily, Praful Aden PA-C, 17 g at 01/22/20 6463    pravastatin (PRAVACHOL) tablet 80 mg, 80 mg, Oral, Daily With Dinner, Nohemi Aden PA-C, 80 mg at 01/22/20 1635    predniSONE tablet 5 mg, 5 mg, Oral, Daily, Praful Aden PA-C, 5 mg at 01/23/20 9525    rOPINIRole (REQUIP) tablet 0 25 mg, 0 25 mg, Oral, BID, Praful Aden PA-C, 0 25 mg at 01/23/20 3040    senna (SENOKOT) tablet 8 6 mg, 1 tablet, Oral, Daily, Praful Aden PA-C, 8 6 mg at 01/23/20 5608    sertraline (ZOLOFT) tablet 200 mg, 200 mg, Oral, Daily, Praful Aden PA-C, 200 mg at 01/23/20 3337    sevelamer (RENAGEL) tablet 1,600 mg, 1,600 mg, Oral, TID With Meals, Crow Adne PA-C, 1,600 mg at 01/23/20 7892    sodium bicarbonate tablet 1,950 mg, 1,950 mg, Oral, TID after meals, Ovi Mast MD, 1,950 mg at 01/23/20 2907    tacrolimus (PROGRAF) capsule 2 mg, 2 mg, Oral, Q12H [facility], Praful Aden PA-C, 2 mg at 01/23/20 7241    torsemide (DEMADEX) tablet 20 mg, 20 mg, Oral, Daily, Birdie Bee, MD, 20 mg at 01/23/20 7187    Invasive Devices:        Lab Results:   Results from last 7 days   Lab Units 01/23/20  0622 01/22/20  0556 01/21/20  0440 01/20/20  0628  01/18/20  0439  01/17/20  0758   WBC Thousand/uL  --  7 17 7 98 8 53   < > 7 63   < > 8 00   HEMOGLOBIN g/dL  --  7 8* 7 7* 7 9*   < > 5 8*   < > 6 5*   HEMATOCRIT %  --  24 6* 24 5* 25 0*   < > 19 2*   < > 21 1*   PLATELETS Thousands/uL  --  163 160 154   < > 151   < > 163   POTASSIUM mmol/L 4 2 4 1 4 1 4 4   < > 4 8   < > 4 9   CHLORIDE mmol/L 106 106 105 107   < > 105   < > 112*   CO2 mmol/L 16* 16* 18* 18*   < > 19*   < > 18*   BUN mg/dL 64* 64* 64* 65*   < > 57*   < > 49*   CREATININE mg/dL 3 81* 3 85* 4 08* 4 37*   < > 4 44*   < > 4 19*   CALCIUM mg/dL 7 9* 8 1* 7 9* 7 9*   < > 8 2*   < > 8 4   MAGNESIUM mg/dL  --   --   --   --   --  2 7*  --  2 9*   PHOSPHORUS mg/dL  --   --   --   --   --  5 3*  --  4 6*   ALK PHOS U/L  --   --   --   --   --   --   --  95   ALT U/L  --   --   --   --   --   --   --  14   AST U/L  --   --   --   --   --   --   --  7    < > = values in this interval not displayed  Previous work up:  RENAL ULTRASOUND     INDICATION:   AK I      COMPARISON: 11/5/2019     TECHNIQUE:   Ultrasound of the retroperitoneum was performed with a curvilinear transducer utilizing volumetric sweeps and still imaging techniques       FINDINGS:     NATIVE KIDNEYS:  There is severe bilateral renal atrophy with measurements below  Right kidney:  6 4 x 3 2 cm  Left kidney: cm      Right kidney  The renal parenchyma is diffusely echogenic consistent with medical renal disease  No suspicious masses detected  1 3 x 1 2 x 1 2 cm simple cyst in the lower pole is present  No hydronephrosis  No shadowing calculi  No perinephric fluid collections      Left kidney  No definitive renal parenchyma seen within the left renal fossa    No suspicious masses identified      Transplant kidney:  Transplant kidney is again seen within the left lower

## 2020-01-23 NOTE — ASSESSMENT & PLAN NOTE
· Evaluated by Gastroenterology last admission for abdominal pain, constipation and fecal stasis  · Reports frequent diarrhea at home while on bowel regimen but now constipated  · Monitor stool output and treat diarrhea versus constipation accordingly    Outpatient colonoscopy recommended

## 2020-01-23 NOTE — ASSESSMENT & PLAN NOTE
· Urine culture positive for gram negative rods, ID following   · Known chronic bacteriuria with intermittent dysuria     Patient did start having some UTI symptoms  Daptomycin was switched to Rocephin  She currently on Keflex to complete 7 days of treatment through 1/26  Id following  Patient was recommended self catheterization for possible episodes of urinary retention  Patient however has 0 postvoid residual   She refuses to self-catheterize     Continue with outpatient Urology follow-up

## 2020-01-24 ENCOUNTER — TELEPHONE (OUTPATIENT)
Dept: NEPHROLOGY | Facility: CLINIC | Age: 55
End: 2020-01-24

## 2020-01-24 ENCOUNTER — APPOINTMENT (OUTPATIENT)
Dept: PHYSICAL THERAPY | Facility: CLINIC | Age: 55
End: 2020-01-24
Payer: MEDICARE

## 2020-01-24 LAB
BKV DNA # SERPL NAA+PROBE: NEGATIVE COPIES/ML
BKV DNA SERPL NAA+PROBE-LOG#: NORMAL LOG10COPY/ML

## 2020-01-24 NOTE — TELEPHONE ENCOUNTER
I called and left a message for patient to schedule a one month follow up appointment with Dr Nicolasa Bray  She also needs an appointment in 7-10 days which she already has one scheduled in [place] with Amadeo Zheng on 1/30  Also per note a BMP is to be done on 1/27

## 2020-01-25 LAB
CMV DNA SERPL NAA+PROBE-ACNC: NEGATIVE IU/ML
CMV DNA SERPL NAA+PROBE-LOG IU: NORMAL LOG10 IU/ML

## 2020-01-27 ENCOUNTER — LAB (OUTPATIENT)
Dept: LAB | Age: 55
End: 2020-01-27
Payer: MEDICARE

## 2020-01-27 ENCOUNTER — OFFICE VISIT (OUTPATIENT)
Dept: PHYSICAL THERAPY | Facility: CLINIC | Age: 55
End: 2020-01-27
Payer: MEDICARE

## 2020-01-27 DIAGNOSIS — R10.84 GENERALIZED ABDOMINAL PAIN: ICD-10-CM

## 2020-01-27 DIAGNOSIS — N17.9 AKI (ACUTE KIDNEY INJURY) (HCC): ICD-10-CM

## 2020-01-27 DIAGNOSIS — R53.81 PHYSICAL DECONDITIONING: ICD-10-CM

## 2020-01-27 DIAGNOSIS — Z94.0 RENAL TRANSPLANT RECIPIENT: ICD-10-CM

## 2020-01-27 DIAGNOSIS — Z74.09 DECREASED MOBILITY AND ENDURANCE: Primary | ICD-10-CM

## 2020-01-27 DIAGNOSIS — T86.10 RENAL TRANSPLANT DISORDER: ICD-10-CM

## 2020-01-27 LAB
ALBUMIN SERPL BCP-MCNC: 3.2 G/DL (ref 3.5–5)
ALP SERPL-CCNC: 86 U/L (ref 46–116)
ALT SERPL W P-5'-P-CCNC: 13 U/L (ref 12–78)
ANION GAP SERPL CALCULATED.3IONS-SCNC: 8 MMOL/L (ref 4–13)
AST SERPL W P-5'-P-CCNC: 7 U/L (ref 5–45)
BILIRUB SERPL-MCNC: 0.4 MG/DL (ref 0.2–1)
BUN SERPL-MCNC: 53 MG/DL (ref 5–25)
CALCIUM SERPL-MCNC: 9.1 MG/DL (ref 8.3–10.1)
CHLORIDE SERPL-SCNC: 113 MMOL/L (ref 100–108)
CO2 SERPL-SCNC: 18 MMOL/L (ref 21–32)
CREAT SERPL-MCNC: 3.32 MG/DL (ref 0.6–1.3)
ERYTHROCYTE [DISTWIDTH] IN BLOOD BY AUTOMATED COUNT: 15.2 % (ref 11.6–15.1)
GFR SERPL CREATININE-BSD FRML MDRD: 15 ML/MIN/1.73SQ M
GLUCOSE P FAST SERPL-MCNC: 85 MG/DL (ref 65–99)
HCT VFR BLD AUTO: 25.3 % (ref 34.8–46.1)
HGB BLD-MCNC: 8 G/DL (ref 11.5–15.4)
MAGNESIUM SERPL-MCNC: 2.6 MG/DL (ref 1.6–2.6)
MCH RBC QN AUTO: 32.5 PG (ref 26.8–34.3)
MCHC RBC AUTO-ENTMCNC: 31.6 G/DL (ref 31.4–37.4)
MCV RBC AUTO: 103 FL (ref 82–98)
PHOSPHATE SERPL-MCNC: 4.9 MG/DL (ref 2.7–4.5)
PLATELET # BLD AUTO: 177 THOUSANDS/UL (ref 149–390)
PMV BLD AUTO: 12.7 FL (ref 8.9–12.7)
POTASSIUM SERPL-SCNC: 4.1 MMOL/L (ref 3.5–5.3)
PROT SERPL-MCNC: 7.5 G/DL (ref 6.4–8.2)
RBC # BLD AUTO: 2.46 MILLION/UL (ref 3.81–5.12)
SODIUM SERPL-SCNC: 139 MMOL/L (ref 136–145)
TACROLIMUS BLD-MCNC: 4.2 NG/ML (ref 2–20)
WBC # BLD AUTO: 6.07 THOUSAND/UL (ref 4.31–10.16)

## 2020-01-27 PROCEDURE — 84100 ASSAY OF PHOSPHORUS: CPT

## 2020-01-27 PROCEDURE — 36415 COLL VENOUS BLD VENIPUNCTURE: CPT

## 2020-01-27 PROCEDURE — 85027 COMPLETE CBC AUTOMATED: CPT

## 2020-01-27 PROCEDURE — 80197 ASSAY OF TACROLIMUS: CPT

## 2020-01-27 PROCEDURE — 83735 ASSAY OF MAGNESIUM: CPT

## 2020-01-27 PROCEDURE — 97110 THERAPEUTIC EXERCISES: CPT

## 2020-01-27 PROCEDURE — 80053 COMPREHEN METABOLIC PANEL: CPT

## 2020-01-27 NOTE — PROGRESS NOTES
Daily Note     Today's date: 2020  Patient name: Maryann Deleon  : 1965  MRN: [id number]  Referring provider: Giovana Rhodes MD  Dx:   Encounter Diagnosis     ICD-10-CM    1  Decreased mobility and endurance Z74 09    2  Physical deconditioning R53 81    3  Generalized abdominal pain R10 84        Start Time: 1545  Stop Time: 1630  Total time in clinic (min): 45 minutes    Subjective: Pt reports that she is worried about her balance  Pt notes that she was in the hospital recently due to a low blood count  "I just got my blood tested today"  Objective: See treatment diary below      Assessment: Tolerated treatment fair  Pt had LOB 2x during session requiring Min A to prevent fall  Pt requiring rest breaks between interventions  Patient demonstrated fatigue post treatment      Plan: Continue per plan of care        Goals: Patient's goal is to improve standing, walking and balance    Precautions: HTN, DM, kidney/pancreas transplant, multiple myeloma, Depression, PTSD- Amputation- all L toes, R great toe,   Dx: Decreased balance and gait deficits      Daily Treatment Diary       Manuals 2020       Assess HR/SP02 when fatigued  64 HR  93%   Before session     After bike  64 HR  97%                                            Exercise Diary         Bike with  L strapped in   6'       Steps 6 in         Seated toe towel curls R 10x 3' Transition to HEP      Alphabet R foot 1x 2x Transition to HEP      NBOS close supervision         Standing on foam         Standing marching  20x       Sit to stand  6x       Single LP #30         sidesteps  3 laps        Supine marching 10x        SLR visit 6-7         Supine clamshells GTB         Gait around clinic for endurance         LAQ  10                                                             Modalities

## 2020-01-28 ENCOUNTER — TELEPHONE (OUTPATIENT)
Dept: NEPHROLOGY | Facility: CLINIC | Age: 55
End: 2020-01-28

## 2020-01-28 LAB — MISCELLANEOUS LAB TEST RESULT: NORMAL

## 2020-01-28 NOTE — RESULT ENCOUNTER NOTE
Hello    Patient normally is followed up by Ms Quan Llamas  Can you please let the patient know that the renal function is slowly improving  Tacrolimus level is appropriate  No changes for now  Please remind pt of appt in 2 days    Ms Keith Bishop  Please   Evaluate the patient's volume state  Renal function appears to be improving  Spironolactone was held in the hospital   Phosphorus slightly elevated  Bicarbonate improving  Would ask the patient to repeat blood work in 2 weeks   And appt with me in one month    Thank you    np

## 2020-01-28 NOTE — TELEPHONE ENCOUNTER
----- Message from Sharlene Staples MD sent at 1/27/2020 10:25 PM EST -----  Hello    Patient normally is followed up by Ms Ulysses De Luna  Can you please let the patient know that the renal function is slowly improving  Tacrolimus level is appropriate  No changes for now  Please remind pt of appt in 2 days    Ms Tasha Stevens  Please   Evaluate the patient's volume state  Renal function appears to be improving  Spironolactone was held in the hospital   Phosphorus slightly elevated  Bicarbonate improving  Would ask the patient to repeat blood work in 2 weeks   And appt with me in one month    Thank you    np

## 2020-01-30 NOTE — PATIENT INSTRUCTIONS
All questions asked and answered  The patient has been instructed to call office at [phone] with any questions or concerns      The patient has been instructed to obtain prescribed blood work and urine studies prior to next appointment  Avoid NSAID products to include Motrin, Ibuprofen, Aleve, Naprosyn, or naproxen   Get BMP in 2 weeks  Return to office in 4 weeks with Dr Lucinda Gu

## 2020-01-30 NOTE — PROGRESS NOTES
[facility] FOLLOW UP - Nephrology   Jo Rich 53 y o  female MRN: [id number]       ASSESSMENT and PLAN:  Olga Gustafson was seen today for follow-up and chronic kidney disease  Diagnoses and all orders for this visit:    Acute renal failure superimposed on stage 4 chronic kidney disease, unspecified acute renal failure type ([place] [address] )  -suspected prerenal secondary to severe anemia, possibly mild volume depletion with diuretics  -peak creatinine 4 48 while admitted  -most recent creatinine as outpatient on 01/27/2020 improved at 3 32  -patient for BMP in two weeks for ongoing follow-up    Chronic kidney disease, stage 4 (severe) ([place] [address] )  -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 along with multiple episodes of AK I  -previous baseline creatinine 2 5-3 0 (previously 1 8-2 1)  -patient follows Najma Odell  -will have patient return in four weeks for ongoing Chronic Kidney Disease management  -     Basic metabolic panel; Future    Benign hypertension with CKD (chronic kidney disease) stage IV ([facility])  -BP acceptable  -currently on amlodipine 10 mg daily, clonidine 0 2 mg every 8 hours, doxazosin, 2 mg q h s , metoprolol 50 mg daily, hydralazine 100 mg 3 times daily and torsemide 20 mg daily  -no change in current medications  -aldactone remains on hold secondary to recent AK I and risk of hyperkalemia  -will continue trend at this time    Controlled type 1 diabetes mellitus with diabetic neuropathy ([place] [address] )  -needs adequate blood sugar control  -for primary team    Anemia due to stage 3 chronic kidney disease ([place] [address] )  -suspect secondary to chronic disease with Chronic Kidney Disease and multiple myeloma  -status post transfusion x 2 with recent admission for hgb 6 5  -currently on oral iron  -will check CBC in two weeks to ensure stability  -most recent hemoglobin 8 0 on last CBC on 01/27/2020  -     CBC;  Future    Multiple myeloma not having achieved remission ([place] [address] )  -biopsy proven in diagnosed in April 2019  -continues to follow Hematology/Oncology with Dr Francine Aj  -previously treated with Revlimid    Chronic kidney disease-mineral and bone disorder  Hyperphosphatemia  Secondary hyperparathyroidism, renal ([facility] [address] )  -Patient continues on Renagel 1600 mg t i d  With meals for hyperphosphatemia  -last PTH on 01/24/2019 was 56 5  -will continue to trend PTH and phosphorus with ongoing management of Chronic Kidney Disease IV    Status post kidney transplant/immunosuppression  - Status post kidney and pancreas transplant  -currently on prednisone 5 mg daily and tacrolimus 2 mg every 12 hours  -last tacrolimus level on 1/27/2020 was 4 4    Low bicarbonate  -improved at 18 with last blood work on 01/27/2020  -continues on oral bicarbonate supplements 19 50 mg 3 times daily  -will continue to trend with ongoing blood work and follow up          Patient Instructions   All questions asked and answered  The patient has been instructed to call office at [phone] with any questions or concerns  The patient has been instructed to obtain prescribed blood work and urine studies prior to next appointment  Avoid NSAID products to include Motrin, Ibuprofen, Aleve, Naprosyn, or naproxen   Get BMP in 2 weeks  Return to office in 4 weeks with Dr Chinmay Whatley          HPI: Zachary Dumont is a 53 y o  female who is here for Follow-up (EDUARDO) and Chronic Kidney Disease  Zachary Dumont has a PMH of CKD IV, s/p renal and pancreas transplant in 1998 on I/S medications, prior EDUARDO insults, HTN, anemia of chronic disease, multiple myeloma and follows Heme/onc previously on Revlimid, and hx of UTI (VRE) requiring ABX treatment who returns to office today for hospital follow-up from 1/17/20-7/23/20 for EDUARDO likely prerenal secondary to severe anemia, possibly mild volume depletion with diuretics  Admission HGB 6 5 S/P transfusions while admitted  Peak creatinine 4 48 on 1/19/20 and discharged 3 81    Most recent BMP reviewed by Dr Chinmay Whatley on 1/27/2020 reveals improving creatinine at 3 32, phos 4 9, bicarb 18, potassium 4 1, sodium 139, Tacrolimus 4 2  Medication list reviewed  On discussion,  she is feeling better  She reports participating in outpatient physical therapy  She denies current chest pain, shortness of breath, dizziness, lightheadedness, nausea, vomiting, urinary issues, fevers or chills  She does report feeling slight illness about two days ago with vague symptoms but completely resolved  Reported that she will be discussing possible transplant possibility with Dr Gusman at his next visit  ROS:   All the systems were reviewed and were negative except as documented on the HPI  Allergies: Ixazomib; Revlimid [lenalidomide]; Cefadroxil; Latex;  Morphine; Morphine and related; Myrbetriq [mirabegron]; and Penicillins    Medications:   Current Outpatient Medications:     amLODIPine (NORVASC) 10 mg tablet, Take 1 tablet (10 mg total) by mouth daily TAKE 1 TABLET(10MG) BY MOUTH EVERY MORNING AND 1/2 TABLET(5MG) EVERY EVENING, Disp: 30 tablet, Rfl: 0    aspirin 81 MG tablet, Take 1 tablet by mouth daily, Disp: , Rfl:     Cholecalciferol (VITAMIN D3) 1000 units CAPS, Take 3 capsules by mouth daily  , Disp: , Rfl:     cloNIDine (CATAPRES) 0 2 mg tablet, Take 1 tablet (0 2 mg total) by mouth every 8 (eight) hours, Disp: 90 tablet, Rfl: 0    doxazosin (CARDURA) 2 mg tablet, Take 1 tablet (2 mg total) by mouth daily at bedtime, Disp: 30 tablet, Rfl: 0    ferrous sulfate 325 (65 Fe) mg tablet, Take 1 tablet (325 mg total) by mouth daily with breakfast, Disp: 30 tablet, Rfl: 0    folic acid (FOLVITE) 1 mg tablet, TAKE 1 TABLET BY MOUTH DAILY AS DIRECTED, Disp: 90 tablet, Rfl: 3    hydrALAZINE (APRESOLINE) 100 MG tablet, Take 1 tablet (100 mg total) by mouth 3 (three) times a day, Disp: 270 tablet, Rfl: 0    Insulin Glargine (TOUJEO) 300 units/mL CONCETRATED U-300 injection pen, Inject 5 Units under the skin daily at bedtime, Disp: 4 pen, Rfl: 0    Insulin Pen Needle (BD PEN NEEDLE VISHNU U/F) 32G X 4 MM MISC, Use 4 times daily, Disp: 400 each, Rfl: 1    Lancets (ONETOUCH ULTRASOFT) lancets, by Does not apply route 4 (four) times a day  , Disp: , Rfl:     levothyroxine 125 mcg tablet, Take 1 tablet (125 mcg total) by mouth daily, Disp: 90 tablet, Rfl: 2    LORazepam (ATIVAN) 2 mg tablet, Take 1 tablet (2 mg total) by mouth 3 (three) times a day as needed for anxiety, Disp: 90 tablet, Rfl: 3    metoprolol tartrate (LOPRESSOR) 50 mg tablet, TAKE 1 TABLET(50 MG TOTAL) BY MOUTH TWICE DAILY, Disp: 180 tablet, Rfl: 0    pravastatin (PRAVACHOL) 80 mg tablet, TAKE 1 TABLET BY MOUTH DAILY, Disp: 90 tablet, Rfl: 5    predniSONE 5 mg tablet, Take 1 tablet (5 mg total) by mouth daily, Disp: 90 tablet, Rfl: 3    rOPINIRole (REQUIP) 0 25 mg tablet, TAKE 1 TABLET BY MOUTH TWICE DAILY, Disp: 180 tablet, Rfl: 1    sertraline (ZOLOFT) 100 mg tablet, TAKE 2 TABLETS(200MG) BY MOUTH DAILY  DAYS, Disp: 180 tablet, Rfl: 0    sevelamer (RENAGEL) 800 mg tablet, Take 2 tablets (1,600 mg total) by mouth 3 (three) times a day with meals, Disp: 90 tablet, Rfl: 1    sodium bicarbonate 650 mg tablet, Take 3 tablets (1,950 mg total) by mouth 3 (three) times a day, Disp: 180 tablet, Rfl: 0    tacrolimus (PROGRAF) 1 mg capsule, Take 2 capsules (2 mg total) by mouth every 12 (twelve) hours, Disp: 120 capsule, Rfl: 0    torsemide (DEMADEX) 20 mg tablet, Take 1 tablet (20 mg total) by mouth daily, Disp: 30 tablet, Rfl: 0    ARIPiprazole (ABILIFY) 30 mg tablet, Take 1 tablet (30 mg total) by mouth daily at bedtime for 180 days, Disp: 90 tablet, Rfl: 1    busPIRone (BUSPAR) 5 mg tablet, Take 1 tablet (5 mg total) by mouth 2 (two) times a day for 180 days, Disp: 180 tablet, Rfl: 1    DULoxetine (CYMBALTA) 60 mg delayed release capsule, Take 1 capsule (60 mg total) by mouth 2 (two) times a day for 180 days, Disp: 180 capsule, Rfl: 1    Past Medical History:   Diagnosis Date    Abnormal liver function test     Acute kidney [facility] ([facility] [address] )     Acute on chronic congestive heart failure (HCC)     Allergic urticaria     Anemia     Cancer (HCC)     Multiple myeloma    Cervical dysplasia     Cholelithiasis     Chronic diastolic (congestive) heart failure ([facility] [address] ) 9/18/2017    Diabetes [facility]     Previous, controlled with diet    Diabetes mellitus with foot ulcer ([facility] )     Disease of thyroid gland     Encephalopathy     Hematuria     + leak est- secondary to UTIs/panc drainage    History of transfusion     Hyperkalemia     Hypertension     Iliotibial band syndrome     Lumbar radiculopathy     Multiple myeloma (HCC)     Multiple myeloma ([facility] )     Night blindness     Nonrheumatic aortic (valve) insufficiency     Pneumonia     Renal disorder     Retinopathy     Seborrhea     Seizure ([facility]     Shingles     Sinus tachycardia     B blocker - cardio echo stress test 02 normal/neg LE doppler 2/02 OK and 12/07    Status post simultaneous kidney and pancreas transplant ([facility] )     Toe amputation status     Trochanteric bursitis      Past Surgical History:   Procedure Laterality Date    CATARACT EXTRACTION      CHOLECYSTECTOMY      COLONOSCOPY      two polyps in the rectum removed and biopsied diverticulosis in the sigmoid colon, external hemorrhoiods- Dr Barfield    COMBINED KIDNEY-PANCREAS TRANSPLANT N/A     CT BONE MARROW BIOPSY AND ASPIRATION  4/17/2019    CYSTOSCOPY N/A 10/13/2016    Procedure: CYSTOSCOPY, retrograde pyelogram, biopsy of ureteral polyp; Surgeon: Osmel Mckay MD;  Location: [facility];  Service:    Isabelle Armando DILATION AND CURETTAGE OF UTERUS      ESOPHAGOGASTRODUODENOSCOPY N/A 11/20/2017    Procedure: ESOPHAGOGASTRODUODENOSCOPY (EGD); Surgeon: Mary Ann Her DO;  Location: [facility];   Service: Gastroenterology    EYE SURGERY      cataracts    FOOT AMPUTATION THROUGH METATARSAL Left     FOOT SURGERY Right     excision of metatarsal heads    HALLUX VALGUS CORRECTION Right     NEPHRECTOMY TRANSPLANTED ORGAN      PANCREATIC TRANSPLANT REMOVAL  1998     Family History   Problem Relation Age of Onset    Hypertension Mother     Cancer Mother     Hypertension Father     Cancer Father     Cancer Maternal Grandfather     Cancer Paternal Grandmother     Cancer Paternal Grandfather     Depression Sister     Breast cancer Maternal Grandmother 77      reports that she quit smoking about 7 years ago  She has never used smokeless tobacco  She reports that she does not drink alcohol or use drugs  Physical Exam:   Vitals:    01/31/20 1020   BP: 140/60   BP Location: Left arm   Patient Position: Sitting   Cuff Size: Large   Pulse: 66   Resp: 16   Weight: 97 1 kg (214 lb)   Height: 5' 11" (1 803 m)     Body mass index is 29 85 kg/m²  General: cooperative, in not acute distress, using a cane   Eyes: conjunctivae pink, anicteric sclerae  ENT: lips and mucous membranes moist  Neck: supple, no JVD appreciated  Chest: clear breath sounds bilateral, no crackles, ronchus or wheezing  CVS:  normal rate, regular rhythm without rub  Abdomen: soft, non-tender, non-distended, normoactive bowel sounds  Back: no CVA tenderness  Extremities: no significant edema of both legs  Skin: no rash, warm and dry    Has dry flaky skin lower extremities  Neuro: awake, alert, oriented      Lab Results:  Results for orders placed or performed in visit on 01/27/20   Comprehensive metabolic panel   Result Value Ref Range    Sodium 139 136 - 145 mmol/L    Potassium 4 1 3 5 - 5 3 mmol/L    Chloride 113 (H) 100 - 108 mmol/L    CO2 18 (L) 21 - 32 mmol/L    ANION GAP 8 4 - 13 mmol/L    BUN 53 (H) 5 - 25 mg/dL    Creatinine 3 32 (H) 0 60 - 1 30 mg/dL    Glucose, Fasting 85 65 - 99 mg/dL    Calcium 9 1 8 3 - 10 1 mg/dL    AST 7 5 - 45 U/L    ALT 13 12 - 78 U/L    Alkaline Phosphatase 86 46 - 116 U/L    Total Protein 7 5 6 4 - 8 2 g/dL    Albumin 3 2 (L) 3 5 - 5 0 g/dL    Total Bilirubin 0 40 0 20 - 1 00 mg/dL eGFR 15 ml/min/1 73sq m   Magnesium   Result Value Ref Range    Magnesium 2 6 1 6 - 2 6 mg/dL   Phosphorus   Result Value Ref Range    Phosphorus 4 9 (H) 2 7 - 4 5 mg/dL   Tacrolimus level   Result Value Ref Range    TACROLIMUS 4 2 2 0 - 20 0 ng/mL   CBC and Platelet   Result Value Ref Range    WBC 6 07 4 31 - 10 16 Thousand/uL    RBC 2 46 (L) 3 81 - 5 12 Million/uL    Hemoglobin 8 0 (L) 11 5 - 15 4 g/dL    Hematocrit 25 3 (L) 34 8 - 46 1 %     (H) 82 - 98 fL    MCH 32 5 26 8 - 34 3 pg    MCHC 31 6 31 4 - 37 4 g/dL    RDW 15 2 (H) 11 6 - 15 1 %    Platelets 697 727 - 502 Thousands/uL    MPV 12 7 8 9 - 12 7 fL     *Note: Due to a large number of results and/or encounters for the requested time period, some results have not been displayed  A complete set of results can be found in Results Review  Results from last 7 days   Lab Units 01/27/20  0706   WBC Thousand/uL 6 07   HEMOGLOBIN g/dL 8 0*   HEMATOCRIT % 25 3*   PLATELETS Thousands/uL 177   SODIUM mmol/L 139   POTASSIUM mmol/L 4 1   CHLORIDE mmol/L 113*   CO2 mmol/L 18*   BUN mg/dL 53*   CREATININE mg/dL 3 32*   CALCIUM mg/dL 9 1   MAGNESIUM mg/dL 2 6   PHOSPHORUS mg/dL 4 9*           Portions of the record may have been created with voice recognition software  Occasional wrong word or "sound a like" substitutions may have occurred due to the inherent limitations of voice recognition software   Read the chart carefully and recognize,

## 2020-01-31 ENCOUNTER — OFFICE VISIT (OUTPATIENT)
Dept: NEPHROLOGY | Facility: CLINIC | Age: 55
End: 2020-01-31
Payer: MEDICARE

## 2020-01-31 ENCOUNTER — OFFICE VISIT (OUTPATIENT)
Dept: PHYSICAL THERAPY | Facility: CLINIC | Age: 55
End: 2020-01-31
Payer: MEDICARE

## 2020-01-31 VITALS
DIASTOLIC BLOOD PRESSURE: 60 MMHG | SYSTOLIC BLOOD PRESSURE: 140 MMHG | BODY MASS INDEX: 29.96 KG/M2 | HEART RATE: 66 BPM | WEIGHT: 214 LBS | HEIGHT: 71 IN | RESPIRATION RATE: 16 BRPM

## 2020-01-31 DIAGNOSIS — Z74.09 DECREASED MOBILITY AND ENDURANCE: Primary | ICD-10-CM

## 2020-01-31 DIAGNOSIS — R53.81 PHYSICAL DECONDITIONING: ICD-10-CM

## 2020-01-31 DIAGNOSIS — R10.84 GENERALIZED ABDOMINAL PAIN: ICD-10-CM

## 2020-01-31 DIAGNOSIS — E83.39 HYPERPHOSPHATEMIA: ICD-10-CM

## 2020-01-31 DIAGNOSIS — D63.1 ANEMIA DUE TO STAGE 3 CHRONIC KIDNEY DISEASE (HCC): ICD-10-CM

## 2020-01-31 DIAGNOSIS — E83.9 CHRONIC KIDNEY DISEASE-MINERAL AND BONE DISORDER: ICD-10-CM

## 2020-01-31 DIAGNOSIS — N18.4 CHRONIC KIDNEY DISEASE, STAGE 4 (SEVERE) (HCC): ICD-10-CM

## 2020-01-31 DIAGNOSIS — N18.30 ANEMIA DUE TO STAGE 3 CHRONIC KIDNEY DISEASE (HCC): ICD-10-CM

## 2020-01-31 DIAGNOSIS — N18.4 ACUTE RENAL FAILURE SUPERIMPOSED ON STAGE 4 CHRONIC KIDNEY DISEASE, UNSPECIFIED ACUTE RENAL FAILURE TYPE (HCC): Primary | ICD-10-CM

## 2020-01-31 DIAGNOSIS — Z94.0 STATUS POST KIDNEY TRANSPLANT: ICD-10-CM

## 2020-01-31 DIAGNOSIS — N17.9 ACUTE RENAL FAILURE SUPERIMPOSED ON STAGE 4 CHRONIC KIDNEY DISEASE, UNSPECIFIED ACUTE RENAL FAILURE TYPE (HCC): Primary | ICD-10-CM

## 2020-01-31 DIAGNOSIS — E87.8 LOW BICARBONATE: ICD-10-CM

## 2020-01-31 DIAGNOSIS — C90.00 MULTIPLE MYELOMA NOT HAVING ACHIEVED REMISSION (HCC): ICD-10-CM

## 2020-01-31 DIAGNOSIS — N25.81 SECONDARY HYPERPARATHYROIDISM, RENAL (HCC): ICD-10-CM

## 2020-01-31 DIAGNOSIS — M89.9 CHRONIC KIDNEY DISEASE-MINERAL AND BONE DISORDER: ICD-10-CM

## 2020-01-31 DIAGNOSIS — N18.9 CHRONIC KIDNEY DISEASE-MINERAL AND BONE DISORDER: ICD-10-CM

## 2020-01-31 DIAGNOSIS — I12.9 BENIGN HYPERTENSION WITH CKD (CHRONIC KIDNEY DISEASE) STAGE IV (HCC): ICD-10-CM

## 2020-01-31 DIAGNOSIS — N18.4 BENIGN HYPERTENSION WITH CKD (CHRONIC KIDNEY DISEASE) STAGE IV (HCC): ICD-10-CM

## 2020-01-31 DIAGNOSIS — E10.40 CONTROLLED TYPE 1 DIABETES MELLITUS WITH DIABETIC NEUROPATHY (HCC): ICD-10-CM

## 2020-01-31 PROCEDURE — 97112 NEUROMUSCULAR REEDUCATION: CPT | Performed by: PHYSICAL THERAPIST

## 2020-01-31 PROCEDURE — 97110 THERAPEUTIC EXERCISES: CPT | Performed by: PHYSICAL THERAPIST

## 2020-01-31 PROCEDURE — 99214 OFFICE O/P EST MOD 30 MIN: CPT | Performed by: NURSE PRACTITIONER

## 2020-01-31 NOTE — PROGRESS NOTES
Daily Note     Today's date: 2020  Patient name: Maryann Deleon  : 1965  MRN: [id number]  Referring provider: Giovana Rhodes MD  Dx:   Encounter Diagnosis     ICD-10-CM    1  Decreased mobility and endurance Z74 09    2  Physical deconditioning R53 81    3  Generalized abdominal pain R10 84                   Subjective: She had good news from MD stating the kidney function and Hgb is back to normal  She feels like she has more energy due to the good news  Her LLE was quite sore after last session for a day  Objective: See treatment diary below      Assessment: Tolerated treatment much better than last session  She showed good motivation and minimal sway c NBOS  Will assess tolerance to today's session  Plan: Continue per plan of care        Goals: Patient's goal is to improve standing, walking and balance    Precautions: HTN, DM, kidney/pancreas transplant, multiple myeloma, Depression, PTSD- Amputation- all L toes, R great toe,   Dx: Decreased balance and gait deficits      Daily Treatment Diary       Manuals 2020      Assess HR/SP02 when fatigued  64 HR  93%   Before session     After bike  64 HR  97% 70 HR/94% before                                           Exercise Diary         Bike with  L strapped in   6' 6'      Steps 6 in   7x      Seated toe towel curls R 10x 3' Transition to HEP      Alphabet R foot 1x 2x Transition to HEP      NBOS close supervision   1 min      Standing on foam         Standing marching  20x 20x1 15x1      Sit to stand  6x 6x      Single LP #30         sidesteps  3 laps  3x      Supine marching 10x        SLR visit 6-7         Supine clamshells GTB         Gait around clinic for endurance   1 5 lap 1min 11 sec      LAQ  10 10x                                                            Modalities

## 2020-02-03 ENCOUNTER — APPOINTMENT (OUTPATIENT)
Dept: PHYSICAL THERAPY | Facility: CLINIC | Age: 55
End: 2020-02-03
Payer: MEDICARE

## 2020-02-05 DIAGNOSIS — R10.9 ABDOMINAL PAIN: ICD-10-CM

## 2020-02-05 RX ORDER — TORSEMIDE 20 MG/1
TABLET ORAL
Qty: 90 TABLET | Refills: 3 | Status: SHIPPED | OUTPATIENT
Start: 2020-02-05 | End: 2021-01-01 | Stop reason: SDUPTHER

## 2020-02-05 RX ORDER — DOXAZOSIN 2 MG/1
2 TABLET ORAL
Qty: 30 TABLET | Refills: 6 | Status: SHIPPED | OUTPATIENT
Start: 2020-02-05 | End: 2020-02-05

## 2020-02-05 RX ORDER — SPIRONOLACTONE 25 MG/1
25 TABLET ORAL DAILY
Qty: 30 TABLET | Refills: 3 | OUTPATIENT
Start: 2020-02-05

## 2020-02-05 RX ORDER — DOXAZOSIN 2 MG/1
TABLET ORAL
Qty: 90 TABLET | Refills: 3 | Status: SHIPPED | OUTPATIENT
Start: 2020-02-05 | End: 2020-01-01 | Stop reason: SDUPTHER

## 2020-02-05 RX ORDER — TORSEMIDE 20 MG/1
20 TABLET ORAL DAILY
Qty: 30 TABLET | Refills: 6 | Status: SHIPPED | OUTPATIENT
Start: 2020-02-05 | End: 2020-02-05

## 2020-02-05 NOTE — TELEPHONE ENCOUNTER
Please call pharmacy and patient  According to discharge summary Spironolactone was placed on hold during hospitalization  Recommend to check with nephrology Dr Nicolasa Bray if patient should stay off Spironolactone  Rx refilled for Cardura and Torsemide

## 2020-02-06 LAB
LEFT EYE DIABETIC RETINOPATHY: NORMAL
RIGHT EYE DIABETIC RETINOPATHY: NORMAL

## 2020-02-07 ENCOUNTER — OFFICE VISIT (OUTPATIENT)
Dept: PHYSICAL THERAPY | Facility: CLINIC | Age: 55
End: 2020-02-07
Payer: MEDICARE

## 2020-02-07 DIAGNOSIS — R10.84 GENERALIZED ABDOMINAL PAIN: ICD-10-CM

## 2020-02-07 DIAGNOSIS — Z74.09 DECREASED MOBILITY AND ENDURANCE: Primary | ICD-10-CM

## 2020-02-07 DIAGNOSIS — R53.81 PHYSICAL DECONDITIONING: ICD-10-CM

## 2020-02-07 PROCEDURE — 97112 NEUROMUSCULAR REEDUCATION: CPT | Performed by: PHYSICAL THERAPIST

## 2020-02-07 PROCEDURE — 97110 THERAPEUTIC EXERCISES: CPT | Performed by: PHYSICAL THERAPIST

## 2020-02-07 NOTE — PROGRESS NOTES
Daily Note     Today's date: 2020  Patient name: Johanna Mattson  : 1965  MRN: [id number]  Referring provider: Jose Francisco Clayton MD  Dx:   Encounter Diagnosis     ICD-10-CM    1  Decreased mobility and endurance Z74 09    2  Physical deconditioning R53 81    3  Generalized abdominal pain R10 84                   Subjective: She states on Monday she had tearing due to the fluid in her eyes and has like a film over her eyes  She has blurry vision now and is not able to drive  She will be having a procedure next week to have a needle in the eye to extract fluid  She felt great after last visit  Objective: See treatment diary below      Assessment: Tolerated treatment much better than last session  She showed good motivation and improved her activity tolerance today  Will continue to progress as tolerable  Plan: Continue per plan of care        Goals: Patient's goal is to improve standing, walking and balance[facility]    Precautions: HTN, DM, kidney/pancreas transplant, multiple myeloma, Depression, PTSD- Amputation- all L toes, R great toe,   Dx: Decreased balance and gait deficits      Daily Treatment Diary       Manuals 2020     Assess HR/SP02 when fatigued  64 HR  93%   Before session     After bike  64 HR  97% 70 HR/94% before 67 HR 97%                                          Exercise Diary         Bike with  L strapped in   6' 6' 6'     Steps 6 in   7x 8x     Seated toe towel curls R 10x 3' Transition to HEP      Alphabet R foot 1x 2x Transition to HEP      NBOS close supervision   1 min 1 min     Standing on foam         Standing marching  20x 20x1 15x1 2x20     Sit to stand  6x 6x 7x     Single LP #30    nv     sidesteps  3 laps  3x 3x     Supine marching 10x        SLR visit 6-7         Supine clamshells GTB         Gait around clinic for endurance   1 5 lap 1min 11 sec 2 laps 1 min 17 sec     LAQ  10 10x 20x Modalities

## 2020-02-10 ENCOUNTER — OFFICE VISIT (OUTPATIENT)
Dept: PHYSICAL THERAPY | Facility: CLINIC | Age: 55
End: 2020-02-10
Payer: MEDICARE

## 2020-02-10 DIAGNOSIS — R53.81 PHYSICAL DECONDITIONING: ICD-10-CM

## 2020-02-10 DIAGNOSIS — R10.84 GENERALIZED ABDOMINAL PAIN: ICD-10-CM

## 2020-02-10 DIAGNOSIS — Z74.09 DECREASED MOBILITY AND ENDURANCE: Primary | ICD-10-CM

## 2020-02-10 PROCEDURE — 97110 THERAPEUTIC EXERCISES: CPT

## 2020-02-10 NOTE — PROGRESS NOTES
Daily Note     Today's date: 2/10/2020  Patient name: Chi Morales  : 1965  MRN: [id number]  Referring provider: Carolann Nelson MD  Dx:   Encounter Diagnosis     ICD-10-CM    1  Decreased mobility and endurance Z74 09    2  Physical deconditioning R53 81    3  Generalized abdominal pain R10 84        Start Time: 1630  Stop Time: 1710  Total time in clinic (min): 40 minutes    Subjective: "I am only here today because my dad made me come"  Pt requesting to hold interventions that make her uncomfortable because of her eye problem, was accommodated  Objective: See treatment diary below      Assessment: Tolerated treatment well  Fatigues with side steps  Able to progress to leg press to good tolerance  Plan: Continue per plan of care        Goals: Patient's goal is to improve standing, walking and balance, walk [employer]    Precautions: HTN, DM, kidney/pancreas transplant, multiple myeloma, Depression, PTSD- Amputation- all L toes, R great toe,   Dx: Decreased balance and gait deficits      Daily Treatment Diary       Manuals 2020 01/27 1/31 2/7 02/10    Assess HR/SP02 when fatigued  64 HR  93%   Before session     After bike  64 HR  97% 70 HR/94% before 67 HR 97% 67 HR   96%                                         Exercise Diary         Bike with  L strapped in   6' 6' 6' 6'     Steps 6 in   7x 8x     Seated toe towel curls R 10x 3' Transition to HEP      Alphabet R foot 1x 2x Transition to HEP      NBOS close supervision   1 min 1 min 1 min     Standing on foam         Standing marching  20x 20x1 15x1 2x20 2x20     Sit to stand  6x 6x 7x 7x     Single LP #30    nv DL 20   SL 10  Inc weight   sidesteps  3 laps  3x 3x 4x     Supine marching 10x        SLR visit 6-7         Supine clamshells GTB         Gait around clinic for endurance   1 5 lap 1min 11 sec 2 laps 1 min 17 sec 2 min mirror    LAQ  10 10x 20x 20                                                          Modalities

## 2020-02-11 ENCOUNTER — APPOINTMENT (OUTPATIENT)
Dept: LAB | Age: 55
End: 2020-02-11
Payer: MEDICARE

## 2020-02-11 DIAGNOSIS — N18.30 ANEMIA DUE TO STAGE 3 CHRONIC KIDNEY DISEASE (HCC): ICD-10-CM

## 2020-02-11 DIAGNOSIS — D63.1 ANEMIA DUE TO STAGE 3 CHRONIC KIDNEY DISEASE (HCC): ICD-10-CM

## 2020-02-11 DIAGNOSIS — N18.4 CHRONIC KIDNEY DISEASE, STAGE 4 (SEVERE) (HCC): ICD-10-CM

## 2020-02-11 LAB
ANION GAP SERPL CALCULATED.3IONS-SCNC: 5 MMOL/L (ref 4–13)
BUN SERPL-MCNC: 45 MG/DL (ref 5–25)
CALCIUM SERPL-MCNC: 8.6 MG/DL (ref 8.3–10.1)
CHLORIDE SERPL-SCNC: 106 MMOL/L (ref 100–108)
CO2 SERPL-SCNC: 25 MMOL/L (ref 21–32)
CREAT SERPL-MCNC: 3.62 MG/DL (ref 0.6–1.3)
ERYTHROCYTE [DISTWIDTH] IN BLOOD BY AUTOMATED COUNT: 14.5 % (ref 11.6–15.1)
GFR SERPL CREATININE-BSD FRML MDRD: 13 ML/MIN/1.73SQ M
GLUCOSE P FAST SERPL-MCNC: 58 MG/DL (ref 65–99)
HCT VFR BLD AUTO: 23.3 % (ref 34.8–46.1)
HGB BLD-MCNC: 7.1 G/DL (ref 11.5–15.4)
MCH RBC QN AUTO: 31.1 PG (ref 26.8–34.3)
MCHC RBC AUTO-ENTMCNC: 30.5 G/DL (ref 31.4–37.4)
MCV RBC AUTO: 102 FL (ref 82–98)
PLATELET # BLD AUTO: 155 THOUSANDS/UL (ref 149–390)
PMV BLD AUTO: 12.8 FL (ref 8.9–12.7)
POTASSIUM SERPL-SCNC: 4.8 MMOL/L (ref 3.5–5.3)
RBC # BLD AUTO: 2.28 MILLION/UL (ref 3.81–5.12)
SODIUM SERPL-SCNC: 136 MMOL/L (ref 136–145)
WBC # BLD AUTO: 7.66 THOUSAND/UL (ref 4.31–10.16)

## 2020-02-11 PROCEDURE — 80048 BASIC METABOLIC PNL TOTAL CA: CPT

## 2020-02-11 PROCEDURE — 85027 COMPLETE CBC AUTOMATED: CPT

## 2020-02-13 ENCOUNTER — APPOINTMENT (OUTPATIENT)
Dept: PHYSICAL THERAPY | Facility: CLINIC | Age: 55
End: 2020-02-13
Payer: MEDICARE

## 2020-02-13 NOTE — PROGRESS NOTES
Daily Note     Today's date: 2020  Patient name: Lucas Pedro  : 1965  MRN: [id number]  Referring provider: Elieser Dela Cruz MD  Dx:   Encounter Diagnosis     ICD-10-CM    1  Decreased mobility and endurance Z74 09    2  Physical deconditioning R53 81    3  Generalized abdominal pain R10 84                   Subjective: "I am only here today because my dad made me come"  Pt requesting to hold interventions that make her uncomfortable because of her eye problem, was accommodated  Objective: See treatment diary below      Assessment: Tolerated treatment well  Fatigues with side steps  Able to progress to leg press to good tolerance  Plan: Continue per plan of care        Goals: Patient's goal is to improve standing, walking and balance, walk [place]    Precautions: HTN, DM, kidney/pancreas transplant, multiple myeloma, Depression, PTSD- Amputation- all L toes, R great toe,   Dx: Decreased balance and gait deficits      Daily Treatment Diary       Manuals 2020 01/27 1/31 2/7 02/10 02/13   Assess HR/SP02 when fatigued  64 HR  93%   Before session     After bike  64 HR  97% 70 HR/94% before 67 HR 97% 67 HR   96%                                         Exercise Diary         Bike with  L strapped in   6' 6' 6' 6'     Steps 6 in   7x 8x     Seated toe towel curls R 10x 3' Transition to HEP      Alphabet R foot 1x 2x Transition to HEP      NBOS close supervision   1 min 1 min 1 min     Standing on foam         Standing marching  20x 20x1 15x1 2x20 2x20     Sit to stand  6x 6x 7x 7x     Single LP #30    nv DL 20   SL 10  Inc weight   sidesteps  3 laps  3x 3x 4x     Supine marching 10x        SLR visit 6-7         Supine clamshells GTB         Gait around clinic for endurance   1 5 lap 1min 11 sec 2 laps 1 min 17 sec 2 min mirror    LAQ  10 10x 20x 20                                                          Modalities

## 2020-02-14 ENCOUNTER — TELEPHONE (OUTPATIENT)
Dept: HEMATOLOGY ONCOLOGY | Facility: CLINIC | Age: 55
End: 2020-02-14

## 2020-02-14 DIAGNOSIS — E10.65 TYPE 1 DIABETES MELLITUS WITH HYPERGLYCEMIA (HCC): Primary | ICD-10-CM

## 2020-02-14 RX ORDER — INSULIN LISPRO 100 [IU]/ML
INJECTION, SOLUTION INTRAVENOUS; SUBCUTANEOUS
Qty: 2 PEN | Refills: 0 | Status: SHIPPED | OUTPATIENT
Start: 2020-02-14 | End: 2020-01-01 | Stop reason: SDUPTHER

## 2020-02-14 NOTE — TELEPHONE ENCOUNTER
Patient called to reschedule appointment with Reyes Munster  Original appointment date and time: 02/14 at 9:00  Appointment rescheduled to: 03/06 at 9:30am  Location: [facility]  Patient verbalized understanding of above

## 2020-02-15 DIAGNOSIS — E78.2 MIXED HYPERLIPIDEMIA: ICD-10-CM

## 2020-02-15 RX ORDER — FOLIC ACID 1 MG/1
TABLET ORAL
Qty: 90 TABLET | Refills: 3 | Status: SHIPPED | OUTPATIENT
Start: 2020-02-15 | End: 2021-01-01 | Stop reason: HOSPADM

## 2020-02-17 NOTE — PROGRESS NOTES
PT Re-Evaluation    Today's date: 2020   Patient name: Bigg Cummings  : 1965  MRN: [id number]  Referring provider: Kaushik Baumann MD  Dx:   Encounter Diagnosis     ICD-10-CM    1  Decreased mobility and endurance Z74 09    2  Generalized abdominal pain R10 84            Subjective Evaluation     History of Present Illness  Today she feels a sharp pain in lateral L hip  She states with her eyes they will not operate until March  She states she does feel better but she feels hindered because of L leg and eye  She was able to stand and wash hair/brush teeth  Hx of injury:   Patient presents with c/o weakness from being in and out of the hospital for a few months  She is unable to stand to brush her teeth s breaks  At the hospital she walked 200ft  She feels her endurance is poor and has weak legs  All her toes are amputated on the L foot and R foot has the big toe amputated  She states she does not feel pain due to neuropathy as she has had splinters unknowingly and now she checks her feet multiple times/day  She was dx with multiple myeloma for which she is taking oral chemotherapy- for a couple of months  She was not taking it for 6 weeks while in the hospital  At this phase she walks with a cane or walker infrequently  She had an admission to the hospital with chronic urinary tract infection which this happens quite frequently      Another admission to the hospital in the beginning of 2020 due to having lost control of bowels and she was given antibiotics which made it worse  She is going to her PCP to discuss this as she is still unable to control her bladder especially when exercises  She is taking miralax which she will discuss c MD on best way to help tx colitis      Neuro signs: Tingling numbness in both knees down to feet- >10 years  Red flags: multiple myeloma  Occupation: Disability- >10 years [address]      Pain    At worst pain ratin  L lateral hip      Social Support  Lives by herself and states it is getting hard in her apartment  She has 2 JESSI which are difficult  She has difficulty c bathing but has a shower chair/railings/toilet chair  Patient Goals  Patient goals for therapy: improve balance, weakness, endurance    STGs  1  Decrease pain by 20% in 2-4 weeks  2  Improve hip add ROM by 10 degrees in 2-4 weeks  -met  3  Improve hip strength by 1/3 grade in 2-4 weeks  - met  4  Improve standing tolerance to performing ADLs at home- met    LTGs  1  Decrease pain by 60% in 6-8 weeks  2  Improve walking tolerance to 2-3 blocks s being SOB in 10 weeks  3  Perform standing activities >10 mins in 6-8 weeks  Objective Measurements:    Observation: yellow tint-jaundice like appearance in skin  Gait: c cane LLE in ER due to no toes  Sensation: WFL except in B feet  Reflexes:NT  Functional squat: C UE support heel raise and pain in B knees  Slump:-  SLR: 3 reps each side c stomach pulling    Balance: SLS 2 finger support 8 sec R  11sec L    NBOS: mod wobble 3 sec no UE   Tandem: R 1 finger 5 sec / L 1 finger 5 sec  TU 58 sec c cane NT  5XSST: 14 66sec c  B UE      Knee A/PROM L R Strength L R   Flex   /  / Flex 4+ 4+   Ext  /  / Ext 4+ 4+           Hip A/PROM [facility]      Flex   Flex 4+ 4+   ER at 90   ER     IR at 90 0 0 IR     Abd 25 32 Abd 4+ 4   Ext   Ext             Lumbar AROM   Ankle     Flex Holding onto railing WFL  DF     Ext ~15 LOB- min A  PF        Toes ROM  Poor toe ext/flex ROM           Assessment:    Candelario Gonzales has demonstrated overall improvement in ROM, strength, function, and improvement in gait endurance  Currently, she has made steady progress towards her goals, but continues to remain limited with endurance, RLE hip strength, and balance deficits  At this time, skilled physical therapy is warranted to address the remaining impairments and aide in return to independent HEP   Limiting factors to therapy comorbidities and she  demonstrates good motivation  Plan  Patient would benefit from:Skilled physical therapy  Planned therapy interventions: manual therapy, neuromuscular re-education, stretching, strengthening, therapeutic activities, therapeutic exercise, patient education, home exercise program, and activity modification      Frequency:1-2x week  Duration in weeks: 6  Treatment plan discussed with: patient         Goals: Patient's goal is to improve standing, walking and balance    Precautions: HTN, DM, kidney/pancreas transplant, multiple myeloma, Depression, PTSD- Amputation- all L toes, R great toe,   Dx: Decreased balance and gait deficits      Daily Treatment Diary       Manuals 1/16/2020 01/27 1/31 2/7 02/10 2/18   Assess HR/SP02 when fatigued  64 HR  93%   Before session     After bike  64 HR  97% 70 HR/94% before 67 HR 97% 67 HR   96%                                         Exercise Diary         Bike with  L strapped in   6' 6' 6' 6'  6'   Steps 6 in   7x 8x  10x            bridges      7x   NBOS close supervision   1 min 1 min 1 min     Standing on foam         Standing marching  20x 20x1 15x1 2x20 2x20  2x20   Sit to stand  6x 6x 7x 7x     Single LP #30 incr wt nv    nv DL 20   SL 10     sidesteps  3 laps  3x 3x 4x  4x   Supine marching 10x        SLR visit 6-7      3x   Supine clamshells GTB         Gait around clinic for endurance   1 5 lap 1min 11 sec 2 laps 1 min 17 sec 2 min mirror 1 min 29 sec   LAQ  10 10x 20x 20 20x                                                         Modalities

## 2020-02-18 ENCOUNTER — EVALUATION (OUTPATIENT)
Dept: PHYSICAL THERAPY | Facility: CLINIC | Age: 55
End: 2020-02-18
Payer: MEDICARE

## 2020-02-18 DIAGNOSIS — F33.42 MAJOR DEPRESSIVE DISORDER, RECURRENT EPISODE, IN FULL REMISSION (HCC): Primary | Chronic | ICD-10-CM

## 2020-02-18 DIAGNOSIS — Z74.09 DECREASED MOBILITY AND ENDURANCE: Primary | ICD-10-CM

## 2020-02-18 DIAGNOSIS — R10.84 GENERALIZED ABDOMINAL PAIN: ICD-10-CM

## 2020-02-18 PROCEDURE — 97112 NEUROMUSCULAR REEDUCATION: CPT | Performed by: PHYSICAL THERAPIST

## 2020-02-18 PROCEDURE — 97110 THERAPEUTIC EXERCISES: CPT | Performed by: PHYSICAL THERAPIST

## 2020-02-18 RX ORDER — ARIPIPRAZOLE 30 MG/1
30 TABLET ORAL
Qty: 90 TABLET | Refills: 0 | Status: SHIPPED | OUTPATIENT
Start: 2020-02-18 | End: 2020-01-01 | Stop reason: SDUPTHER

## 2020-02-19 ENCOUNTER — TELEPHONE (OUTPATIENT)
Dept: HEMATOLOGY ONCOLOGY | Facility: CLINIC | Age: 55
End: 2020-02-19

## 2020-02-19 NOTE — TELEPHONE ENCOUNTER
[facility] called the [facility] asking if patient is still on Revlimid 5mg  Medication is not current on her profile  Last seen by Dr Micki Lozada on 1/8 and medication is in his notes  Please confirm and call them back at [phone], you can ask for any pharm

## 2020-02-21 ENCOUNTER — TELEPHONE (OUTPATIENT)
Dept: INFUSION CENTER | Facility: HOSPITAL | Age: 55
End: 2020-02-21

## 2020-02-21 ENCOUNTER — OFFICE VISIT (OUTPATIENT)
Dept: PHYSICAL THERAPY | Facility: CLINIC | Age: 55
End: 2020-02-21
Payer: MEDICARE

## 2020-02-21 DIAGNOSIS — R53.81 PHYSICAL DECONDITIONING: ICD-10-CM

## 2020-02-21 DIAGNOSIS — Z74.09 DECREASED MOBILITY AND ENDURANCE: Primary | ICD-10-CM

## 2020-02-21 DIAGNOSIS — R10.84 GENERALIZED ABDOMINAL PAIN: ICD-10-CM

## 2020-02-21 PROCEDURE — 97112 NEUROMUSCULAR REEDUCATION: CPT

## 2020-02-21 PROCEDURE — 97110 THERAPEUTIC EXERCISES: CPT

## 2020-02-21 NOTE — PROGRESS NOTES
Daily Note     Today's date: 2020  Patient name: Jaye Stark  : 1965  MRN: [id number]  Referring provider: Michael Velazquez MD  Dx:   Encounter Diagnosis     ICD-10-CM    1  Decreased mobility and endurance Z74 09    2  Generalized abdominal pain R10 84    3  Physical deconditioning R53 81        Start Time: 0830  Stop Time: 09  Total time in clinic (min): 46 minutes    Subjective: Pt reports that she was fatigued after last session  Pt c/o L sided hip pain today  Objective: See treatment diary below      Assessment: Tolerated treatment well  Improved endurance with some interventions but declined in walking tolerance  No exacerbation of L hip pain reported  Patient demonstrated fatigue post treatment      Plan: Continue per plan of care        Goals: Patient's goal is to improve standing, walking and balance    Precautions: HTN, DM, kidney/pancreas transplant, multiple myeloma, Depression, PTSD- Amputation- all L toes, R great toe,   Dx: Decreased balance and gait deficits      Daily Treatment Diary       Manuals 02/21  1/31 2/7 02/10 2/18   Assess HR/SP02 when fatigued   70 HR/94% before 67 HR 97% 67 HR   96%                                         Exercise Diary         Bike with  L strapped in  6'   6' 6' 6'  6'   Steps 6 in 8" 7x   7x 8x  10x            bridges 7x     7x   NBOS close supervision   1 min 1 min 1 min     Standing on foam         Standing marching 20   20x1 15x1 2x20 2x20  2x20   Sit to stand 8x   6x 7x 7x     Single LP #30 incr wt nv #45 10x   nv DL 20   SL 10     sidesteps 5 laps  3x 3x 4x  4x   Supine marching 20        SLR visit 6-7 5 R  10 L      3x   Supine clamshells GTB         Gait around clinic for endurance 56 seconds  1 5 lap 1min 11 sec 2 laps 1 min 17 sec 2 min mirror 1 min 29 sec   LAQ 30  10x 20x 20 20x                                                         Modalities

## 2020-02-21 NOTE — TELEPHONE ENCOUNTER
[id number] calling back for status of revlimid  Per RN Note:    Patient is coming into office for a f/u to further assess her revlimid  Electronically signed by Karrie Jimenez RN at 2/20/2020 11:30 AM          F/U is 3/6/2020 with PA  [facility] informed to keep revlimid on hold until patient seen on 3/6/2020

## 2020-02-24 ENCOUNTER — APPOINTMENT (OUTPATIENT)
Dept: PHYSICAL THERAPY | Facility: CLINIC | Age: 55
End: 2020-02-24
Payer: MEDICARE

## 2020-02-24 DIAGNOSIS — R10.9 ABDOMINAL PAIN: ICD-10-CM

## 2020-02-24 RX ORDER — TACROLIMUS 1 MG/1
CAPSULE ORAL
Qty: 120 CAPSULE | Refills: 0 | OUTPATIENT
Start: 2020-02-24

## 2020-02-25 DIAGNOSIS — R10.9 ABDOMINAL PAIN: ICD-10-CM

## 2020-02-25 RX ORDER — TACROLIMUS 1 MG/1
CAPSULE ORAL
Qty: 120 CAPSULE | Refills: 0 | OUTPATIENT
Start: 2020-02-25

## 2020-02-25 RX ORDER — TACROLIMUS 1 MG/1
2 CAPSULE ORAL EVERY 12 HOURS SCHEDULED
Qty: 120 CAPSULE | Refills: 0 | Status: SHIPPED | OUTPATIENT
Start: 2020-02-25 | End: 2020-01-01

## 2020-02-25 NOTE — TELEPHONE ENCOUNTER
Can you please confirm with the patient that she receive this medication  [facility] sent a refill this morning

## 2020-02-28 ENCOUNTER — OFFICE VISIT (OUTPATIENT)
Dept: PHYSICAL THERAPY | Facility: CLINIC | Age: 55
End: 2020-02-28
Payer: MEDICARE

## 2020-02-28 DIAGNOSIS — Z74.09 DECREASED MOBILITY AND ENDURANCE: Primary | ICD-10-CM

## 2020-02-28 DIAGNOSIS — R53.81 PHYSICAL DECONDITIONING: ICD-10-CM

## 2020-02-28 DIAGNOSIS — R10.84 GENERALIZED ABDOMINAL PAIN: ICD-10-CM

## 2020-02-28 PROCEDURE — 97110 THERAPEUTIC EXERCISES: CPT | Performed by: PHYSICAL THERAPIST

## 2020-02-28 PROCEDURE — 97112 NEUROMUSCULAR REEDUCATION: CPT | Performed by: PHYSICAL THERAPIST

## 2020-02-28 NOTE — PROGRESS NOTES
Daily Note     Today's date: 2020  Patient name: Cathye Babinski  : 1965  MRN: [id number]  Referring provider: Nilsa Josue MD  Dx:   Encounter Diagnosis     ICD-10-CM    1  Decreased mobility and endurance Z74 09    2  Generalized abdominal pain R10 84    3  Physical deconditioning R53 81                   Subjective: Pt reports she is feeling okay today s any L knee pain but her R knee feels a bit weaker  Objective: See treatment diary below      Assessment: Tolerated treatment well  Improved endurance of walking and increased wt for LAQ c some difficulty on RLE  Will progress as tolerable  Plan: Continue per plan of care        Goals: Patient's goal is to improve standing, walking and balance    Precautions: HTN, DM, kidney/pancreas transplant, multiple myeloma, Depression, PTSD- Amputation- all L toes, R great toe,   Dx: Decreased balance and gait deficits      Daily Treatment Diary       Manuals 02/21 2/28 1/31 2/7 02/10 2/18   Assess HR/SP02 when fatigued  p walking 104bpm 98% 70 HR/94% before 67 HR 97% 67 HR   96%                                         Exercise Diary         Bike with  L strapped in  6'  6' 6' 6' 6'  6'   Steps 6 in 8" 7x  Held today 7x 8x  10x                  7x            Standing on foam         Standing marching 20  20x 20x1 15x1 2x20 2x20  2x20   Sit to stand 8x  8x 6x 7x 7x     Single LP #30 incr wt nv #45 10x #45  nv DL 20   SL 10     sidesteps 5 laps 5 laps 3x 3x 4x  4x   Supine marching 20 fatigue       SLR visit 6-7 5 R  10 L  R 10x L 15x    3x   Supine clamshells GTB         Gait around clinic for endurance 56 seconds 2min 5 sec 1 5 lap 1min 11 sec 2 laps 1 min 17 sec 2 min mirror 1 min 29 sec   LAQ 30 30x #1 10x 20x 20 20x                                                         Modalities

## 2020-03-02 ENCOUNTER — OFFICE VISIT (OUTPATIENT)
Dept: PHYSICAL THERAPY | Facility: CLINIC | Age: 55
End: 2020-03-02
Payer: MEDICARE

## 2020-03-02 DIAGNOSIS — R53.81 PHYSICAL DECONDITIONING: ICD-10-CM

## 2020-03-02 DIAGNOSIS — R10.84 GENERALIZED ABDOMINAL PAIN: ICD-10-CM

## 2020-03-02 DIAGNOSIS — Z74.09 DECREASED MOBILITY AND ENDURANCE: Primary | ICD-10-CM

## 2020-03-02 PROCEDURE — 97110 THERAPEUTIC EXERCISES: CPT

## 2020-03-02 PROCEDURE — 97112 NEUROMUSCULAR REEDUCATION: CPT

## 2020-03-02 NOTE — PROGRESS NOTES
Daily Note     Today's date: 3/2/2020  Patient name: Zachary Dumont  : 1965  MRN: [id number]  Referring provider: Sherry Nichols MD  Dx:   Encounter Diagnosis     ICD-10-CM    1  Decreased mobility and endurance Z74 09    2  Generalized abdominal pain R10 84    3  Physical deconditioning R53 81        Start Time: 9654  Stop Time: 5232  Total time in clinic (min): 39 minutes    Subjective: Pt reports being able to stand and do dishes due to her improved endurance  Objective: See treatment diary below      Assessment: Tolerated treatment well  Pt walking endurance decreased this session, possibly due to pt doing more ADL's at home before session  Able to increase with reps this session with fatigue  Plan: Continue per plan of care        Goals: Patient's goal is to improve standing, walking and balance    Precautions: HTN, DM, kidney/pancreas transplant, multiple myeloma, Depression, PTSD- Amputation- all L toes, R great toe,   Dx: Decreased balance and gait deficits      Daily Treatment Diary       Manuals 02/21 2/28 03/02  02/10 2/18   Assess HR/SP02 when fatigued  p walking 104bpm 98%   67 HR   96%                                         Exercise Diary         Bike with  L strapped in  6'  6' 6'  6'  6'   Steps 6 in 8" 7x  Held today    10x                  7x            Standing on foam         Standing marching 20  20x 1# 24x   2x20  2x20   Sit to stand 8x  8x 8x   7x     Single LP #30 incr wt nv #45 10x #45 45# 2x10   DL 20   SL 10     sidesteps 5 laps 5 laps 5 laps   4x  4x   Supine marching 20 fatigue 20      SLR visit 6-7 5 R  10 L  R 10x L 15x R 12 L 17x   3x   Supine clamshells GTB         Gait around clinic for endurance 56 seconds 2min 5 sec 1' 37"   2 min mirror 1 min 29 sec   LAQ 30 30x #1 35 1#   20 20x                                                         Modalities

## 2020-03-03 DIAGNOSIS — I10 BENIGN ESSENTIAL HTN: ICD-10-CM

## 2020-03-03 RX ORDER — METOPROLOL TARTRATE 50 MG/1
TABLET, FILM COATED ORAL
Qty: 180 TABLET | Refills: 0 | Status: SHIPPED | OUTPATIENT
Start: 2020-03-03 | End: 2021-01-01 | Stop reason: HOSPADM

## 2020-03-04 ENCOUNTER — PATIENT OUTREACH (OUTPATIENT)
Dept: FAMILY MEDICINE CLINIC | Facility: CLINIC | Age: 55
End: 2020-03-04

## 2020-03-05 ENCOUNTER — TELEPHONE (OUTPATIENT)
Dept: NEPHROLOGY | Facility: CLINIC | Age: 55
End: 2020-03-05

## 2020-03-05 ENCOUNTER — LAB (OUTPATIENT)
Dept: LAB | Age: 55
End: 2020-03-05
Payer: MEDICARE

## 2020-03-05 ENCOUNTER — TELEPHONE (OUTPATIENT)
Dept: HEMATOLOGY ONCOLOGY | Facility: CLINIC | Age: 55
End: 2020-03-05

## 2020-03-05 ENCOUNTER — TRANSCRIBE ORDERS (OUTPATIENT)
Dept: ADMINISTRATIVE | Age: 55
End: 2020-03-05

## 2020-03-05 ENCOUNTER — APPOINTMENT (OUTPATIENT)
Dept: LAB | Age: 55
End: 2020-03-05
Payer: MEDICARE

## 2020-03-05 ENCOUNTER — LAB (OUTPATIENT)
Dept: LAB | Facility: CLINIC | Age: 55
End: 2020-03-05
Payer: MEDICARE

## 2020-03-05 DIAGNOSIS — D64.9 ANEMIA, UNSPECIFIED TYPE: ICD-10-CM

## 2020-03-05 DIAGNOSIS — D63.1 ANEMIA DUE TO STAGE 3 CHRONIC KIDNEY DISEASE (HCC): ICD-10-CM

## 2020-03-05 DIAGNOSIS — C90.00 MULTIPLE MYELOMA NOT HAVING ACHIEVED REMISSION (HCC): ICD-10-CM

## 2020-03-05 DIAGNOSIS — N18.30 ANEMIA IN STAGE 3 CHRONIC KIDNEY DISEASE (HCC): ICD-10-CM

## 2020-03-05 DIAGNOSIS — N18.30 ANEMIA DUE TO STAGE 3 CHRONIC KIDNEY DISEASE (HCC): ICD-10-CM

## 2020-03-05 DIAGNOSIS — N18.30 ANEMIA IN STAGE 3 CHRONIC KIDNEY DISEASE (HCC): Primary | ICD-10-CM

## 2020-03-05 DIAGNOSIS — D63.1 ANEMIA IN STAGE 3 CHRONIC KIDNEY DISEASE (HCC): ICD-10-CM

## 2020-03-05 DIAGNOSIS — T86.10 RENAL TRANSPLANT DISORDER: ICD-10-CM

## 2020-03-05 DIAGNOSIS — N17.9 ACUTE KIDNEY INJURY (HCC): ICD-10-CM

## 2020-03-05 DIAGNOSIS — Z94.0 RENAL TRANSPLANT RECIPIENT: ICD-10-CM

## 2020-03-05 DIAGNOSIS — D63.1 ANEMIA IN STAGE 3 CHRONIC KIDNEY DISEASE (HCC): Primary | ICD-10-CM

## 2020-03-05 LAB
ABO GROUP BLD: NORMAL
ALBUMIN SERPL BCP-MCNC: 3.3 G/DL (ref 3.5–5)
ALP SERPL-CCNC: 91 U/L (ref 46–116)
ALT SERPL W P-5'-P-CCNC: 14 U/L (ref 12–78)
ANION GAP SERPL CALCULATED.3IONS-SCNC: 10 MMOL/L (ref 4–13)
AST SERPL W P-5'-P-CCNC: 14 U/L (ref 5–45)
BASOPHILS # BLD AUTO: 0.06 THOUSANDS/ΜL (ref 0–0.1)
BASOPHILS NFR BLD AUTO: 1 % (ref 0–1)
BILIRUB SERPL-MCNC: 0.52 MG/DL (ref 0.2–1)
BLD GP AB SCN SERPL QL: POSITIVE
BUN SERPL-MCNC: 45 MG/DL (ref 5–25)
CALCIUM SERPL-MCNC: 8.6 MG/DL (ref 8.3–10.1)
CHLORIDE SERPL-SCNC: 105 MMOL/L (ref 100–108)
CO2 SERPL-SCNC: 25 MMOL/L (ref 21–32)
CREAT SERPL-MCNC: 3.63 MG/DL (ref 0.6–1.3)
EOSINOPHIL # BLD AUTO: 0.25 THOUSAND/ΜL (ref 0–0.61)
EOSINOPHIL NFR BLD AUTO: 4 % (ref 0–6)
ERYTHROCYTE [DISTWIDTH] IN BLOOD BY AUTOMATED COUNT: 14.9 % (ref 11.6–15.1)
GFR SERPL CREATININE-BSD FRML MDRD: 13 ML/MIN/1.73SQ M
GLUCOSE P FAST SERPL-MCNC: 109 MG/DL (ref 65–99)
HCT VFR BLD AUTO: 20.2 % (ref 34.8–46.1)
HGB BLD-MCNC: 6.5 G/DL (ref 11.5–15.4)
IGA SERPL-MCNC: 108 MG/DL (ref 70–400)
IGG SERPL-MCNC: 1900 MG/DL (ref 700–1600)
IGM SERPL-MCNC: 46 MG/DL (ref 40–230)
IMM GRANULOCYTES # BLD AUTO: 0.05 THOUSAND/UL (ref 0–0.2)
IMM GRANULOCYTES NFR BLD AUTO: 1 % (ref 0–2)
LYMPHOCYTES # BLD AUTO: 1.59 THOUSANDS/ΜL (ref 0.6–4.47)
LYMPHOCYTES NFR BLD AUTO: 26 % (ref 14–44)
MAGNESIUM SERPL-MCNC: 3.1 MG/DL (ref 1.6–2.6)
MCH RBC QN AUTO: 32.8 PG (ref 26.8–34.3)
MCHC RBC AUTO-ENTMCNC: 32.2 G/DL (ref 31.4–37.4)
MCV RBC AUTO: 102 FL (ref 82–98)
MONOCYTES # BLD AUTO: 0.57 THOUSAND/ΜL (ref 0.17–1.22)
MONOCYTES NFR BLD AUTO: 9 % (ref 4–12)
NEUTROPHILS # BLD AUTO: 3.55 THOUSANDS/ΜL (ref 1.85–7.62)
NEUTS SEG NFR BLD AUTO: 59 % (ref 43–75)
NRBC BLD AUTO-RTO: 0 /100 WBCS
PHOSPHATE SERPL-MCNC: 4.4 MG/DL (ref 2.7–4.5)
PLATELET # BLD AUTO: 160 THOUSANDS/UL (ref 149–390)
PMV BLD AUTO: 12.1 FL (ref 8.9–12.7)
POTASSIUM SERPL-SCNC: 4.4 MMOL/L (ref 3.5–5.3)
PROT SERPL-MCNC: 7.6 G/DL (ref 6.4–8.2)
RBC # BLD AUTO: 1.98 MILLION/UL (ref 3.81–5.12)
RH BLD: POSITIVE
SODIUM SERPL-SCNC: 140 MMOL/L (ref 136–145)
SPECIMEN EXPIRATION DATE: NORMAL
WBC # BLD AUTO: 6.07 THOUSAND/UL (ref 4.31–10.16)

## 2020-03-05 PROCEDURE — 86850 RBC ANTIBODY SCREEN: CPT

## 2020-03-05 PROCEDURE — 86901 BLOOD TYPING SEROLOGIC RH(D): CPT

## 2020-03-05 PROCEDURE — 82784 ASSAY IGA/IGD/IGG/IGM EACH: CPT

## 2020-03-05 PROCEDURE — 86870 RBC ANTIBODY IDENTIFICATION: CPT

## 2020-03-05 PROCEDURE — 84100 ASSAY OF PHOSPHORUS: CPT

## 2020-03-05 PROCEDURE — 80053 COMPREHEN METABOLIC PANEL: CPT

## 2020-03-05 PROCEDURE — 84165 PROTEIN E-PHORESIS SERUM: CPT | Performed by: PATHOLOGY

## 2020-03-05 PROCEDURE — 85025 COMPLETE CBC W/AUTO DIFF WBC: CPT

## 2020-03-05 PROCEDURE — 86900 BLOOD TYPING SEROLOGIC ABO: CPT

## 2020-03-05 PROCEDURE — 83735 ASSAY OF MAGNESIUM: CPT

## 2020-03-05 PROCEDURE — 80197 ASSAY OF TACROLIMUS: CPT

## 2020-03-05 PROCEDURE — 84165 PROTEIN E-PHORESIS SERUM: CPT

## 2020-03-05 RX ORDER — SODIUM CHLORIDE 9 MG/ML
20 INJECTION, SOLUTION INTRAVENOUS ONCE
Status: CANCELLED | OUTPATIENT
Start: 2020-01-01

## 2020-03-05 NOTE — RESULT ENCOUNTER NOTE
Hello    Patient normally is followed up by Ms Julianna Fair       Can you please let the patient know that the magnesium is elevated but the electrolytes and renal function is relatively stable  -can you please make sure the patient is not taking any magnesium supplements  -can you please have the patient bring all of her med bottles next week including any supplements for us to review  -can you please make sure the patient is not taking any Tums  -will give the patient new lab slips at the appointment next week    Thank you    np

## 2020-03-05 NOTE — TELEPHONE ENCOUNTER
----- Message from Marilee Vernon MD sent at 3/5/2020  1:28 PM EST -----  Hello    Patient normally is followed up by Ms Julianna Fair       Can you please let the patient know that the magnesium is elevated but the electrolytes and renal function is relatively stable  -can you please make sure the patient is not taking any magnesium supplements  -can you please have the patient bring all of her med bottles next week including any supplements for us to review  -can you please make sure the patient is not taking any Tums  -will give the patient new lab slips at the appointment next week    Thank you    np

## 2020-03-05 NOTE — TELEPHONE ENCOUNTER
Set the patient up for 2 units of blood tomorrow at the [facility]  Called the patient and instructed her to have a type and screen drawn today at [facility]  This will be sent to the [facility]  The patient has antibodies so she needs this done today  Patient states she does not have a ride to have the type and screen drawn but she will try and find one  All orders entered

## 2020-03-05 NOTE — TELEPHONE ENCOUNTER
Patient called to confirm their appointment  Appointment confirmed for Marcia Jackson                Appointment date and time:  03/16 at [facility] location: [place]               Patient verbalized understanding of above

## 2020-03-06 NOTE — PLAN OF CARE
Problem: Potential for Falls  Goal: Patient will remain free of falls  Description  INTERVENTIONS:  - Assess patient frequently for physical needs  -  Identify cognitive and physical deficits and behaviors that affect risk of falls    -  Ancram fall precautions as indicated by assessment   - Educate patient/family on patient safety including physical limitations  - Instruct patient to call for assistance with activity based on assessment  - Modify environment to reduce risk of injury  - Consider OT/PT consult to assist with strengthening/mobility  Outcome: Progressing

## 2020-03-06 NOTE — PROGRESS NOTES
Pt to infusion for 2 U PRBC today  T&S sent to BB, due to antibodies in blood [facility] having difficulty cross matching  Pt opted to go home for the day and is scheduled to return tomorrow 3/7/20 for 2 units  PIV wrapped

## 2020-03-06 NOTE — PROGRESS NOTES
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, trisomy 11, trisomy 17, hyper deploidy     She did not have response to Ixazomib and was also complicated by grade 3 rash  Bortezomib was not induced because of grade 3 neuropathy, I actually Ixazomib was complicated with grade 3 rash  She declined IV treatment      She was initiated on lenalidomide 5 mg p o  every other day good response initially with decrease in the M protein, kappa light chain however the treatment goal has been interrupted many times because of multiple admissionsto the hospital with over fluid load, recurrence urinary tract infection, acute on chronic kidney failure, symptomatic anemia, colitis, metabolic encephalopathy  Will reduce dose to 2 5mg po every other day  SPEP and serum free light chains are pending from 3/5/2020  However from October through February 2017 the ratio serum free light chains to kappa free light chains has been stable 2 7 ( ratio 3 9 July 2019)    IgG level was michel at 1500 range January 2020  Was 1900 both October 2019 and 3/5/2020  Prior to initiating Revlimid was at 2800 range in July 2019    She will receive transfusion of 2 units packed red blood cells today  Lasix 20 milligrams to be given in between  Will reinitiate Aranesp 200 micrograms every 2 weeks for hemoglobin less than 9 9 gram/deciliter  CBC and type and screen were ordered for Q 2 week      She will follow-up with Nephrology        Patient was seen and treatment plan made with Dr Patricia Pat  Follow-up in 1 month with repeat labs             HPI:   Gustavo Alvarado is a 69-year-old  female with history of type 1 diabetes mellitus, diabetic neuropathy, diabetic nephropathy, status post pancreas and kidney transplant in 1998 at [address], amputation of the right big toe, cholecystectomy, vitamin-D deficiency, exacerbation of diabetes mellitus while she is on insulin, possible pancreas burn out, herpes zoster x2, who was found to have abnormal serum protein electrophoresis in August 2017 with IgG kappa a peak of 0 4 grams/deciliter and peak 2  of 2 27 grams/deciliter, mildly elevated kappa light chain, chronic kidney disease     Status post bone marrow biopsy 9/20/2017 showed 15-20% plasma cells, normal cytogenetics and normal FISH panel for myeloma   Bone skeletal survey showed no evidence of lytic lesions   She was diagnosed with smoldering multiple myeloma   She was meet with Dr Sparkle Spaulding from [facility] to continue to observe     She has diabetic neuropathy grade 3, uses a cane for ambulation, herpes zoster in February 2019      On 04/10/2019 kappa light chain 69, lambda light chain 17 2 with a ratio of 4 0 which increased from ratio of 2 8 in January 2019  Serum protein electrophoresis showed M protein of 0 5 and 2 67 IgG kappa, IgG 2 9 g, IgA 62, IgM 31, creatinine 2 0, total protein 9, albumin 3, calcium 8 6     Repeat bone marrow biopsy on 04/17/2019 showed progressive multiple myeloma with plasma cells in the range of 35%, now with multiple chromosomal abnormalities including 13 Q deletion, 11 Q, trisomy 11, gain of 17 PO trisomy 17, hyperdiploid with cane of additional copies of chromosome 5, 6, 7, 9, 11, 15, 17, 18, large deletion involving most of the chromosome 13 (monosomy 13) 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     Ixazomib with severe pruritus requiring discontinuation in June 2019     Lenalidomide 5 mg p o  Daily 3 weeks on 1 week off approved initiated on 08/07/2019 and discontinued on 08/13/2019 because of pruritus  At her 8/20/2019 f/u treatment options discussed  She elected to Re trial lenalidomide  5 milligrams every other day 3 weeks on 1 week off planned  She did not want IV medication  Admitted 9/9- 9/13/2019 secondary to UTI, EDUARDO/CKD stage IV, Acute metabolic encephalopathy  She was sent to the hospital due to elevated creatinine and dysuria  Revlimid held  Admitted 10/1/2019 2nd to urinary symptoms, dysuria increased frequency, chills and confusion  10/8/2019  Revlimid at 5 mg po every other day, 3 weeks on 1 week off retrialed   She did not want IV treatment  Admitted 11/5- 11/8/2019  Re-admitted 2nd to dysuria and EDUARDO  Moreno catheter placed  Catheter was discontinued 11/22/19 and patient was trained on self catheterization    Admitted 11/23/2019 - 12/06/19 for recurrent UTI  Findings of colitis seen on CT A/P  Admitted  12/9/2019 - 1/2/2020 2nd to fever and abdominal pain, diarrhea, lethargy, confusion  Inpatient rehab recommended  Patient declined  VRE bacteriuria treated with antibiotics  She was thought to be a chronic colonizer  , likely patient has chronic colonizer  She was treated for congestive heart failure  She had been off lenalidomide for about 6 weeks  Interval History:  Another admission to the hospital 1/17/2020 due to anemia requiring blood transfusion  Hemoglobin michel down to 5 8  This was thought to be multifactorial secondary to multiple myeloma and underlying chronic kidney disease  She is found to have acute kidney injury on chronic kidney disease  Creatinine 4 42 with baseline most recently 2 5-3 0  Her medications were adjusted per Nephrology  No active overt bleeding noted  She is found to have another urinary tract infection was treated with antibiotics  She started having some UTI symptoms  she was again on antibiotics  Self catheterization was recommended  Patient refused  Revlimid again held due to her hospitalization  3/5/20:  Hemoglobin 6 5,   SFLC and SPEP results pending  Here with her father  Undergoing PT  Feels she is getting stronger  Feels pretty good now  She denies any chest pain or shortness of breath  She has lost fluid weight  Denies any fevers or chills  No dizziness lightheadedness  No increased bruising or bleeding  Test Results:       3/20       2/20 1/20 10/19 7/19    IgG     1900  1570 1900 2880  SKFLC      120  107 77 87  SLFLC        44  37 28 23   Ratio                     2 7  2 8 2 7 3 9  M1    0 4  M2                                           1 16    1 6      Labs:   Lab Results   Component Value Date    HGB 6 5 (LL) 03/05/2020    HCT 20 2 (L) 03/05/2020     (H) 03/05/2020     03/05/2020    WBC 6 07 03/05/2020    NRBC 0 03/05/2020    ATYLMPCT 1 (H) 12/16/2019     Lab Results   Component Value Date     (L) 01/06/2016    K 4 4 03/05/2020     03/05/2020    CO2 25 03/05/2020    ANIONGAP 6 01/06/2016    BUN 45 (H) 03/05/2020    CREATININE 3 63 (H) 03/05/2020    GLUCOSE 103 09/13/2017    GLUF 109 (H) 03/05/2020    CALCIUM 8 6 03/05/2020    CORRECTEDCA 11 7 (H) 08/28/2018    AST 14 03/05/2020    ALT 14 03/05/2020    ALKPHOS 91 03/05/2020    PROT 8 3 (H) 12/14/2015    BILITOT 0 27 12/14/2015    EGFR 13 03/05/2020       Imaging: No results found  ROS:  As mentioned in HPI & Interval History otherwise 14 point ROS negative  Allergies: Allergies   Allergen Reactions    Ixazomib Rash     Ninlaro    Revlimid [Lenalidomide] Rash    Cefadroxil      Tolerated cefepime 2019    Latex Rash    Morphine Other (See Comments)     Other reaction(s):  Other (See Comments)  projectile vomiting    Morphine And Related GI Intolerance    Myrbetriq [Mirabegron] Hives    Penicillins Hives     Other reaction(s): Other (See Comments)  Respiratory Distress,hives  Tolerates cefazolin     Current Medications: Reviewed  PMH/FH/SH:  Reviewed      Physical Exam:    There is no height or weight on file to calculate BSA      Ht Readings from Last 3 Encounters:   01/31/20 5' 11" (1 803 m)   01/17/20 5' 7" (1 702 m)   01/17/20 5' 7" (1 702 m)        Wt Readings from Last 3 Encounters:   01/31/20 97 1 kg (214 lb)   01/23/20 98 7 kg (217 lb 9 5 oz)   01/17/20 99 3 kg (219 lb)        Temp Readings from Last 3 Encounters:   01/23/20 98 °F (36 7 °C) (Oral)   01/17/20 (!) 97 3 °F (36 3 °C) (Tympanic)   01/08/20 98 °F (36 7 °C)        BP Readings from Last 3 Encounters:   01/31/20 140/60   01/23/20 144/56   01/17/20 (!) 150/40           Physical Exam    ECOG:       Emergency Contacts:    Vianney Sumner, ,

## 2020-03-09 NOTE — TELEPHONE ENCOUNTER
Patient called requesting a refill on: lenalidomide (REVLIMID) 2 5 MG CAPS   Patient has     pills left  Preferred pharmacy:[facility]   Patient's call back # [phone]     Patient has questions please all patient back

## 2020-03-10 NOTE — PROGRESS NOTES
Daily Note     Today's date: 3/10/2020  Patient name: Eric Orosco  : 1965  MRN: [id number]  Referring provider: Mary Crews MD  Dx:   Encounter Diagnosis     ICD-10-CM    1  Decreased mobility and endurance Z74 09    2  Generalized abdominal pain R10 84    3  Physical deconditioning R53 81                   Subjective: She states Saturday she was infused c 2 pints of blood and she feels a lot better  Objective: See treatment diary below      Assessment: Tolerated treatment well  She improved walking tolerance today and increased reps  She did feel weak going up c RLE on steps  Plan: Continue per plan of care        Goals: Patient's goal is to improve standing, walking and balance    Precautions: HTN, DM, kidney/pancreas transplant, multiple myeloma, Depression, PTSD- Amputation- all L toes, R great toe,   Dx: Decreased balance and gait deficits      Daily Treatment Diary       Manuals 02/21 2/28 03/02 3/10 02/10 2/18   Assess HR/SP02 when fatigued  p walking 104bpm 98%   67 HR   96%                                         Exercise Diary         Bike with  L strapped in  6'  6' 6' 6' 6'  6'   Steps 6 in 8" 7x  Held today  6x 8"  10x                  7x            Standing on foam    3x :10     Standing marching 20  20x 1# 24x  #1 20x 2x20  2x20   Sit to stand 8x  8x 8x  8x 7x     Single LP #30 incr wt nv #45 10x #45 45# 2x10  #45 3x10 DL 20   SL 10     sidesteps 5 laps 5 laps 5 laps  6 laps 4x  4x   Supine marching 20 fatigue 20 20x     SLR visit 6-7 5 R  10 L  R 10x L 15x R 12 L 17x 20x  3x   S/l clamshells    2x 8     Gait around clinic for endurance 56 seconds 2min 5 sec 1' 37"  2 min 12 sec 2 min mirror 1 min 29 sec   LAQ 30 30x #1 35 1#  40x #1 20 20x                                                         Modalities

## 2020-03-12 NOTE — PATIENT INSTRUCTIONS
1) increase doxazosin to 4 mg nightly from 2 mg (can take two of 2 mg tab at night)  2) if you get dizzy, lightheaded, or BP less than 150 - top number - with this, then please call and go back to doxazosin 2 mg daily  3) please schedule arm ultrasound for vein mapping and edema evaluation  4) please schedule with vascular surgery for fistula placement evaluation  5) please schedule with kidney education class

## 2020-03-12 NOTE — LETTER
March 12, 2020     Michelle Keating, [facility] [address]    Patient: Bigg Cummings   YOB: 1965   Date of Visit: 3/12/2020       Dear Dr Ivan Maciel:    Thank you for referring Bigg Cummings to me for evaluation  Below are my notes for this consultation  If you have questions, please do not hesitate to call me  I look forward to following your patient along with you  Sincerely,        Chantal Guardado MD        CC: No Recipients  Chantal Guardado MD  3/12/2020 12:49 PM  Sign at close encounter  [address] [facility] 63 y o  female MRN: [id number]  3/12/2020    Reason for Visit: CKD     ASSESSMENT and PLAN:    I had the pleasure of seeing Ms Melany Barroso today in the renal clinic for the continued management of CKD  63 yo female with PMHx of renal pancreas transplant 1998, DM I, failed pancreas transplant 2017, HTN, recurrent UTI/pyelo, recurrent, 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, Did require dialysis in 2014, orthostasis, anemia, hypothyroid, 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, dCHF, aortic regurg, dCHF grade I, zoster in July, subclavian art stenosis on Left presents for follow-up visit for renal transplant     Patient has had multiple admissions and issues recently     9/2017 -acute kidney injury, urinary frequency   Treated initially for UTI      10/2017 -urinary incontinence   Was started on antibiotics for possible UTI   Was started on Bactrim prophylaxis      11/2017 -abdominal pain   Nausea   Confusion   Depression and anxiety   Patient had EGD which showed gastritis   Given volume expansion        2/2018-depression and anxiety   Treated for UTI also        07/2018-treated for urinary tract infection; also was seen in the ER on 07/23 for periorbital cellulitis     9/2018 - renal artery study at [facility] - patent renal artery and anastamosis that did not require intervention     March/april 2019 - saw Hematology  Rising kappa light chain  BM biopsy was recommended       4/16/19 - had foot abscess and treated with antibiotics       4/17/19 - BM biopsy completed  Progressing myeloma cells in BM     8/2019 - patient admitted to the hospital for cystitis, diffuse rash  Felt to be drug rash due to new chemotherapy agent  Cystitis with E coli pansensitive and started on antibiotics    10/2019 - urosepsis with acute kidney injury admission  AT in the setting of prerenal/sepsis    11/2019-presented with dysuria  Concern for pyelonephritis  Patient also had urinary retention but was not able to self-catheterize  Moreno catheter was required at this time    Moreno discontinued on 11/22/19 and patient was supposed to self catheterize    12/2019- presented with urinary tract complaints  Treated for urosepsis with VRE  Required transfusion  12/2019 - represented on 12/9 - patient colitis?    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   -protein creatinine ratio elevated  - follow renal function closely     - advised to drink 2 L fluids daily  - has renal artery stenosis > 60%-s/p renal artery study without stenosis at [place] with IR  - UA with positive nitrite, innumerable bacteria, pyuria   - UPCR 1 62 --> rising 3 13 in February and then slowly improving to 1 9 gm in may  - Overall, 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 and progression of renal dysfunction in the setting of multiple episodes of acute kidney injury   Patient also has paraproteinemia which is likely contributing to proteinuria also       Plan:  - transplant referral to [address] in two weeks including CMP, CBC, Mag, Phos, tac, UA, UPCR, Vit D, PTH  - increase doxazosin to 4 mg nightly (BP elevated at home)  - vein mapping, vascular eval for fistula placement  - duplex of RUE due to edema  - lower salt in diet  - kidney education class  - pt may be moving to [place]     2) Immunosuppression - given Hematological issues, goal tac  3     - tac  2 mg in a m , 2 mg in p m   - Continue prednisone 5 mg     3) HTN - history of orthostasis and low diast BP  widened pulse pressure possible due to aortic valve regurg? needs afterload reduction     - pt states now BP rarely goes above 170; mainly in 160s over 50s  - BP today appropriate in office (unclear if cuff at home is accurate and therefore patient will bring in to compare next visit)  - amlodipine 10 mg in AM and 5 mg in PM, clonidine 0 2 TID, change to oxazosin 4 mg nightly, metoprolol 50 BID, hydralazine 50 TID  -renal artery study with stenosis present - saw [facility] team and had IR procedure on 9/25/18 with CO2 study and 5 cc contrast utilized - the transplant artery is slightly tortuous but the artery was widely patent     4) Anemia -      - prior fec occ positive  - had EGD prior with gastritis  - has MM also  - had transfusion 2 units last week  - further eval per Hematology   Follows closely     5) recurrent UTI/pyelo -     - monitor clinically  - pt's E coli has become resistant over time  - advised patient that patient needs to go to ER if develops urinary symptoms as has resistant bacteria     6) Vaccine - should stay up to date on innactivated vaccinations     -hold on taking shingrix vaccine for one year post shingles infection     7) Lipids - as per PCP with regards to lipid check     8) BMD - DEXA due 2017        - hyperphosphatemia improving with phosphorus binders  -DEXA scan pending     9) proteinuria -likely multifactorial due to advanced age of kidney/allograft nephropathy, paraproteinemia       8) age appropriate ca screening - needs to remain up to date with mammogram an colonoscopy (next c scope is in 8/2018)     - needs to f/u with Derm Yearly - pt will call to make appt  - awaiting repeat EGD with GI ? Will need to f/u if completed     11) depression - Patient states depression is well-controlled with the current regimen      - per Psychiatry team     12) question of renal art stenosis - not evident on study     13) MGUS/IgG Kappa now with myeloma - follows with Hematology/Oncology      -patient may require chemotherapy in the near future  - started on treatment with ixazomib but had a reaction  -patient had reaction to 2nd agent to also with bortezomib  - started on then on lenalidomide     14) Fatigue      15) hyponatremia - improved       16) pancreas transplant -      - failed pancreas allograft - following with Endocrine  - last A1c 5  - C peptide 1 7     17) Volume - relatively euvolemic on current dose of diuretic     18) zoster     -zoster with mult episodes  - now resolved     19) aortic regurg - on ECHO July with grade I dCHF     20) subclavian art stenosis on L     21) acid/base - bicarb tabs were increased to three times/day     - continue for now  Bicarb improved     22) hypermag - repeat     23) weight gain       It was a pleasure of evaluating Ms Lawton Councilman in the renal office  Please do not hesitate to contact our team if further issues or questions arise in the interim  No problem-specific Assessment & Plan notes found for this encounter  HPI:    Pt still weak, but feeling better since out of hospital for longest period in last 6 months  No recent fevers, chills, nausea, vomiting       PATIENT INSTRUCTIONS:    Patient Instructions   1) increase doxazosin to 4 mg nightly from 2 mg (can take two of 2 mg tab at night)  2) if you get dizzy, lightheaded, or BP less than 150 - top number - with this, then please call and go back to doxazosin 2 mg daily  3) please schedule arm ultrasound for vein mapping and edema evaluation  4) please schedule with vascular surgery for fistula placement evaluation  5) please schedule with kidney education class        OBJECTIVE:  Current Weight: Weight - Scale: 95 3 kg (210 lb)  Vitals:    03/12/20 1130   BP: (!) 188/75   BP Location: Left arm   Patient Position: Sitting   Cuff Size: Large   Pulse: 63   Resp: 18   Weight: 95 3 kg (210 lb)   Height: 5' 7" (1 702 m)    Body mass index is 32 89 kg/m²  REVIEW OF SYSTEMS:    Review of Systems   Constitutional: Positive for fatigue  HENT: Negative  Eyes: Negative  Respiratory: Negative  Negative for shortness of breath  Cardiovascular: Negative  Negative for leg swelling  Gastrointestinal: Negative  Endocrine: Negative  Genitourinary: Negative  Negative for difficulty urinating  Musculoskeletal: Negative  Skin: Negative  Allergic/Immunologic: Negative  Neurological: Negative  Hematological: Negative  Psychiatric/Behavioral: Negative  All other systems reviewed and are negative  PHYSICAL EXAM:      Physical Exam   Constitutional: She is oriented to person, place, and time  She appears well-developed and well-nourished  No distress  HENT:   Head: Normocephalic and atraumatic  Mouth/Throat: No oropharyngeal exudate  Eyes: Conjunctivae are normal  Right eye exhibits no discharge  Left eye exhibits no discharge  No scleral icterus  Neck: Normal range of motion  Neck supple  No JVD present  Cardiovascular: Normal rate and regular rhythm  Exam reveals no gallop and no friction rub  No murmur heard  Pulmonary/Chest: Effort normal and breath sounds normal  No respiratory distress  She has no wheezes  She has no rales  Abdominal: Soft  Bowel sounds are normal  She exhibits no distension  There is no tenderness  There is no rebound  Musculoskeletal: Normal range of motion  She exhibits edema  She exhibits no tenderness or deformity  trace   Neurological: She is alert and oriented to person, place, and time  Coordination normal    Skin: Skin is warm and dry  No rash noted  She is not diaphoretic  No erythema  No pallor  Psychiatric: She has a normal mood and affect  Her behavior is normal  Judgment and thought content normal    Nursing note and vitals reviewed        Medications:    Current Outpatient Medications:     amLODIPine (NORVASC) 10 mg tablet, Take 1 tablet (10 mg total) by mouth daily TAKE 1 TABLET(10MG) BY MOUTH EVERY MORNING AND 1/2 TABLET(5MG) EVERY EVENING, Disp: 30 tablet, Rfl: 0    ARIPiprazole (ABILIFY) 30 mg tablet, Take 1 tablet (30 mg total) by mouth daily at bedtime, Disp: 90 tablet, Rfl: 0    aspirin 81 MG tablet, Take 1 tablet by mouth daily, Disp: , Rfl:     busPIRone (BUSPAR) 5 mg tablet, Take 1 tablet (5 mg total) by mouth 2 (two) times a day for 180 days, Disp: 180 tablet, Rfl: 1    Cholecalciferol (VITAMIN D3) 1000 units CAPS, Take 3 capsules by mouth daily  , Disp: , Rfl:     cloNIDine (CATAPRES) 0 2 mg tablet, Take 1 tablet (0 2 mg total) by mouth every 8 (eight) hours, Disp: 90 tablet, Rfl: 0    doxazosin (CARDURA) 2 mg tablet, Take 2 tablets (4 mg total) by mouth daily at bedtime, Disp: 180 tablet, Rfl: 3    DULoxetine (CYMBALTA) 60 mg delayed release capsule, Take 1 capsule (60 mg total) by mouth 2 (two) times a day for 180 days, Disp: 180 capsule, Rfl: 1    folic acid (FOLVITE) 1 mg tablet, TAKE 1 TABLET BY MOUTH EVERY DAY AS DIRECTED, Disp: 90 tablet, Rfl: 3    hydrALAZINE (APRESOLINE) 100 MG tablet, Take 1 tablet (100 mg total) by mouth 3 (three) times a day, Disp: 270 tablet, Rfl: 0    insulin glargine (Toujeo SoloStar) 300 units/mL CONCETRATED U-300 injection pen (1-unit dial), Inject 5 Units under the skin daily at bedtime, Disp: 3 pen, Rfl: 1   insulin lispro (HumaLOG KwikPen) 100 units/mL injection pen, Use up to 20 units daily via sliding scale, Disp: 5 pen, Rfl: 1    Insulin Pen Needle (BD PEN NEEDLE VISHNU U/F) 32G X 4 MM MISC, Use 4 times daily, Disp: 400 each, Rfl: 1    Lancets (ONETOUCH ULTRASOFT) lancets, by Does not apply route 4 (four) times a day  , Disp: , Rfl:     lenalidomide (REVLIMID) 2 5 MG CAPS, Take 2 5 mg every other day auth # [id number] 3/6/20, Disp: 14 capsule, Rfl: 0    levothyroxine 125 mcg tablet, Take 1 tablet (125 mcg total) by mouth daily, Disp: 90 tablet, Rfl: 2    LORazepam (ATIVAN) 2 mg tablet, Take 1 tablet (2 mg total) by mouth 3 (three) times a day as needed for anxiety, Disp: 90 tablet, Rfl: 3    metoprolol tartrate (LOPRESSOR) 50 mg tablet, TAKE 1 TABLET TWICE DAILY, Disp: 180 tablet, Rfl: 0    pravastatin (PRAVACHOL) 80 mg tablet, TAKE 1 TABLET BY MOUTH DAILY, Disp: 90 tablet, Rfl: 5    predniSONE 5 mg tablet, Take 1 tablet (5 mg total) by mouth daily, Disp: 90 tablet, Rfl: 3    rOPINIRole (REQUIP) 0 25 mg tablet, TAKE 1 TABLET BY MOUTH TWICE DAILY, Disp: 180 tablet, Rfl: 1    sertraline (ZOLOFT) 100 mg tablet, TAKE 2 TABLETS(200MG) BY MOUTH DAILY  DAYS, Disp: 180 tablet, Rfl: 0    sevelamer (RENAGEL) 800 mg tablet, Take 2 tablets (1,600 mg total) by mouth 3 (three) times a day with meals, Disp: 90 tablet, Rfl: 1    sodium bicarbonate 650 mg tablet, Take 3 tablets (1,950 mg total) by mouth 3 (three) times a day, Disp: 180 tablet, Rfl: 0    tacrolimus (PROGRAF) 1 mg capsule, Take 2 capsules (2 mg total) by mouth every 12 (twelve) hours, Disp: 120 capsule, Rfl: 0    torsemide (DEMADEX) 20 mg tablet, TAKE 1 TABLET DAILY, Disp: 90 tablet, Rfl: 3    ferrous sulfate 325 (65 Fe) mg tablet, Take 1 tablet (325 mg total) by mouth daily with breakfast (Patient not taking: Reported on 3/12/2020), Disp: 30 tablet, Rfl: 0    Laboratory Results:        Invalid input(s): ALBUMIN    Results for orders placed or performed during the hospital encounter of 03/06/20   Prepare Leukoreduced RBC: 2 Units, Irradiated, Leukoreduced   Result Value Ref Range    Unit Product Code J8542Y37     Unit Number [id number]     Unit ABO O     Unit [facility] POS     Crossmatch Compatible     Unit Dispense Status Presumed Trans     Unit Product Code W6489D60     Unit Number [id number]     Unit ABO O     Unit RH POS     Crossmatch Compatible     Unit Dispense Status Presumed Trans    Direct antiglobulin test   Result Value Ref Range    DIRECT FITO 2+  Positive    PAM IGG   Result Value Ref Range    PAM IgG 2+  Positive    Anti-complement   Result Value Ref Range    PAM C3 Negative    Type and screen   Result Value Ref Range    ABO Grouping O     Rh Factor Positive     Antibody Screen Positive     Specimen Expiration Date 20200309    Antibody identification   Result Value Ref Range    ANTIBODY ID  #1 Anti-E      *Note: Due to a large number of results and/or encounters for the requested time period, some results have not been displayed  A complete set of results can be found in Results Review

## 2020-03-12 NOTE — PROGRESS NOTES
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, dCHF, aortic regurg, dCHF grade I, zoster in July, subclavian art stenosis on Left presents for follow-up visit for renal transplant     Patient has had multiple admissions and issues recently     9/2017 -acute kidney injury, urinary frequency   Treated initially for UTI      10/2017 -urinary incontinence   Was started on antibiotics for possible UTI   Was started on Bactrim prophylaxis      11/2017 -abdominal pain  Nausea   Confusion   Depression and anxiety   Patient had EGD which showed gastritis   Given volume expansion        2/2018-depression and anxiety   Treated for UTI also        07/2018-treated for urinary tract infection; also was seen in the ER on 07/23 for periorbital cellulitis     9/2018 - renal artery study at [facility] - patent renal artery and anastamosis that did not require intervention     March/april 2019 - saw Hematology  Rising kappa light chain  BM biopsy was recommended       4/16/19 - had foot abscess and treated with antibiotics       4/17/19 - BM biopsy completed  Progressing myeloma cells in BM     8/2019 - patient admitted to the hospital for cystitis, diffuse rash  Felt to be drug rash due to new chemotherapy agent  Cystitis with E coli pansensitive and started on antibiotics    10/2019 - urosepsis with acute kidney injury admission  ATN in the setting of prerenal/sepsis    11/2019-presented with dysuria    Concern for pyelonephritis  Patient also had urinary retention but was not able to self-catheterize  Moreno catheter was required at this time    Moreno discontinued on 11/22/19 and patient was supposed to self catheterize    12/2019- presented with urinary tract complaints  Treated for urosepsis with VRE  Required transfusion  12/2019 - represented on 12/9 - patient colitis?    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   -protein creatinine ratio elevated  - follow renal function closely  - advised to drink 2 L fluids daily  - has renal artery stenosis > 60%-s/p renal artery study without stenosis at [facility] with IR  - UA with positive nitrite, innumerable bacteria, pyuria   - UPCR 1 62 --> rising 3 13 in February and then slowly improving to 1 9 gm in may  - Overall, 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 and progression of renal dysfunction in the setting of multiple episodes of acute kidney injury   Patient also has paraproteinemia which is likely contributing to proteinuria also       Plan:  - transplant referral to [address] in two weeks including CMP, CBC, Mag, Phos, tac, UA, UPCR, Vit D, PTH  - increase doxazosin to 4 mg nightly (BP elevated at home)     - vein mapping, vascular eval for fistula placement  - duplex of RUE due to edema  - lower salt in diet  - kidney education class  - pt may be moving to [facility]     2) Immunosuppression - given Hematological issues, goal tac 3     - tac 2 mg in a m , 2 mg in p m   - Continue prednisone 5 mg     3) HTN - history of orthostasis and low diast BP  widened pulse pressure possible due to aortic valve regurg? needs afterload reduction     - pt states now BP rarely goes above 170; mainly in 160s over 50s  - BP today appropriate in office (unclear if cuff at home is accurate and therefore patient will bring in to compare next visit)  - amlodipine 10 mg in AM and 5 mg in PM, clonidine 0 2 TID, change to oxazosin 4 mg nightly, metoprolol 50 BID, hydralazine 50 TID  -renal artery study with stenosis present - saw [facility] team and had IR procedure on 9/25/18 with CO2 study and 5 cc contrast utilized - the transplant artery is slightly tortuous but the artery was widely patent     4) Anemia -      - prior fec occ positive  - had EGD prior with gastritis  - has MM also  - had transfusion 2 units last week  - further eval per Hematology  Follows closely     5) recurrent UTI/pyelo -     - monitor clinically  - pt's E coli has become resistant over time  - advised patient that patient needs to go to ER if develops urinary symptoms as has resistant bacteria     6) Vaccine - should stay up to date on innactivated vaccinations     -hold on taking shingrix vaccine for one year post shingles infection     7) Lipids - as per PCP with regards to lipid check     8) BMD - DEXA due 2017        - hyperphosphatemia improving with phosphorus binders  -DEXA scan pending     9) proteinuria -likely multifactorial due to advanced age of kidney/allograft nephropathy, paraproteinemia       8) age appropriate ca screening - needs to remain up to date with mammogram an colonoscopy (next c scope is in 8/2018)     - needs to f/u with Derm Yearly - pt will call to make appt  - awaiting repeat EGD with GI ?  Will need to f/u if completed     11) depression - Patient states depression is well-controlled with the current regimen      - per Psychiatry team     12) question of renal art stenosis - not evident on study     13) MGUS/IgG Kappa now with myeloma - follows with Hematology/Oncology      -patient may require chemotherapy in the near future  - started on treatment with ixazomib but had a reaction  -patient had reaction to 2nd agent to also with bortezomib  - started on then on lenalidomide     14) Fatigue      15) hyponatremia - improved       16) pancreas transplant -      - failed pancreas allograft - following with Endocrine  - last A1c 5  - C peptide 1 7     17) Volume - relatively euvolemic on current dose of diuretic     18) zoster     -zoster with mult episodes  - now resolved     19) aortic regurg - on ECHO July with grade I dCHF     20) subclavian art stenosis on L     21) acid/base - bicarb tabs were increased to three times/day     - continue for now  Bicarb improved     22) hypermag - repeat     23) weight gain       It was a pleasure of evaluating Ms Melany Barroso in the renal office  Please do not hesitate to contact our team if further issues or questions arise in the interim  No problem-specific Assessment & Plan notes found for this encounter  HPI:    Pt still weak, but feeling better since out of hospital for longest period in last 6 months  No recent fevers, chills, nausea, vomiting       PATIENT INSTRUCTIONS:    Patient Instructions   1) increase doxazosin to 4 mg nightly from 2 mg (can take two of 2 mg tab at night)  2) if you get dizzy, lightheaded, or BP less than 150 - top number - with this, then please call and go back to doxazosin 2 mg daily  3) please schedule arm ultrasound for vein mapping and edema evaluation  4) please schedule with vascular surgery for fistula placement evaluation  5) please schedule with kidney education class        OBJECTIVE:  Current Weight: Weight - Scale: 95 3 kg (210 lb)  Vitals:    03/12/20 1130   BP: (!) 188/75   BP Location: Left arm   Patient Position: Sitting   Cuff Size: Large   Pulse: 63   Resp: 18 Weight: 95 3 kg (210 lb)   Height: 5' 7" (1 702 m)    Body mass index is 32 89 kg/m²  REVIEW OF SYSTEMS:    Review of Systems   Constitutional: Positive for fatigue  HENT: Negative  Eyes: Negative  Respiratory: Negative  Negative for shortness of breath  Cardiovascular: Negative  Negative for leg swelling  Gastrointestinal: Negative  Endocrine: Negative  Genitourinary: Negative  Negative for difficulty urinating  Musculoskeletal: Negative  Skin: Negative  Allergic/Immunologic: Negative  Neurological: Negative  Hematological: Negative  Psychiatric/Behavioral: Negative  All other systems reviewed and are negative  PHYSICAL EXAM:      Physical Exam   Constitutional: She is oriented to person, place, and time  She appears well-developed and well-nourished  No distress  HENT:   Head: Normocephalic and atraumatic  Mouth/Throat: No oropharyngeal exudate  Eyes: Conjunctivae are normal  Right eye exhibits no discharge  Left eye exhibits no discharge  No scleral icterus  Neck: Normal range of motion  Neck supple  No JVD present  Cardiovascular: Normal rate and regular rhythm  Exam reveals no gallop and no friction rub  No murmur heard  Pulmonary/Chest: Effort normal and breath sounds normal  No respiratory distress  She has no wheezes  She has no rales  Abdominal: Soft  Bowel sounds are normal  She exhibits no distension  There is no tenderness  There is no rebound  Musculoskeletal: Normal range of motion  She exhibits edema  She exhibits no tenderness or deformity  trace   Neurological: She is alert and oriented to person, place, and time  Coordination normal    Skin: Skin is warm and dry  No rash noted  She is not diaphoretic  No erythema  No pallor  Psychiatric: She has a normal mood and affect  Her behavior is normal  Judgment and thought content normal    Nursing note and vitals reviewed        Medications:    Current Outpatient Medications:    amLODIPine (NORVASC) 10 mg tablet, Take 1 tablet (10 mg total) by mouth daily TAKE 1 TABLET(10MG) BY MOUTH EVERY MORNING AND 1/2 TABLET(5MG) EVERY EVENING, Disp: 30 tablet, Rfl: 0    ARIPiprazole (ABILIFY) 30 mg tablet, Take 1 tablet (30 mg total) by mouth daily at bedtime, Disp: 90 tablet, Rfl: 0    aspirin 81 MG tablet, Take 1 tablet by mouth daily, Disp: , Rfl:     busPIRone (BUSPAR) 5 mg tablet, Take 1 tablet (5 mg total) by mouth 2 (two) times a day for 180 days, Disp: 180 tablet, Rfl: 1    Cholecalciferol (VITAMIN D3) 1000 units CAPS, Take 3 capsules by mouth daily  , Disp: , Rfl:     cloNIDine (CATAPRES) 0 2 mg tablet, Take 1 tablet (0 2 mg total) by mouth every 8 (eight) hours, Disp: 90 tablet, Rfl: 0    doxazosin (CARDURA) 2 mg tablet, Take 2 tablets (4 mg total) by mouth daily at bedtime, Disp: 180 tablet, Rfl: 3    DULoxetine (CYMBALTA) 60 mg delayed release capsule, Take 1 capsule (60 mg total) by mouth 2 (two) times a day for 180 days, Disp: 180 capsule, Rfl: 1    folic acid (FOLVITE) 1 mg tablet, TAKE 1 TABLET BY MOUTH EVERY DAY AS DIRECTED, Disp: 90 tablet, Rfl: 3    hydrALAZINE (APRESOLINE) 100 MG tablet, Take 1 tablet (100 mg total) by mouth 3 (three) times a day, Disp: 270 tablet, Rfl: 0    insulin glargine (Toujeo SoloStar) 300 units/mL CONCETRATED U-300 injection pen (1-unit dial), Inject 5 Units under the skin daily at bedtime, Disp: 3 pen, Rfl: 1    insulin lispro (HumaLOG KwikPen) 100 units/mL injection pen, Use up to 20 units daily via sliding scale, Disp: 5 pen, Rfl: 1    Insulin Pen Needle (BD PEN NEEDLE VISHNU U/F) 32G X 4 MM MISC, Use 4 times daily, Disp: 400 each, Rfl: 1    Lancets (ONETOUCH ULTRASOFT) lancets, by Does not apply route 4 (four) times a day  , Disp: , Rfl:     lenalidomide (REVLIMID) 2 5 MG CAPS, Take 2 5 mg every other day auth # [id number] 3/6/20, Disp: 14 capsule, Rfl: 0    levothyroxine 125 mcg tablet, Take 1 tablet (125 mcg total) by mouth daily, Disp: 90 tablet, Rfl: 2    LORazepam (ATIVAN) 2 mg tablet, Take 1 tablet (2 mg total) by mouth 3 (three) times a day as needed for anxiety, Disp: 90 tablet, Rfl: 3    metoprolol tartrate (LOPRESSOR) 50 mg tablet, TAKE 1 TABLET TWICE DAILY, Disp: 180 tablet, Rfl: 0    pravastatin (PRAVACHOL) 80 mg tablet, TAKE 1 TABLET BY MOUTH DAILY, Disp: 90 tablet, Rfl: 5    predniSONE 5 mg tablet, Take 1 tablet (5 mg total) by mouth daily, Disp: 90 tablet, Rfl: 3    rOPINIRole (REQUIP) 0 25 mg tablet, TAKE 1 TABLET BY MOUTH TWICE DAILY, Disp: 180 tablet, Rfl: 1    sertraline (ZOLOFT) 100 mg tablet, TAKE 2 TABLETS(200MG) BY MOUTH DAILY  DAYS, Disp: 180 tablet, Rfl: 0    sevelamer (RENAGEL) 800 mg tablet, Take 2 tablets (1,600 mg total) by mouth 3 (three) times a day with meals, Disp: 90 tablet, Rfl: 1    sodium bicarbonate 650 mg tablet, Take 3 tablets (1,950 mg total) by mouth 3 (three) times a day, Disp: 180 tablet, Rfl: 0    tacrolimus (PROGRAF) 1 mg capsule, Take 2 capsules (2 mg total) by mouth every 12 (twelve) hours, Disp: 120 capsule, Rfl: 0    torsemide (DEMADEX) 20 mg tablet, TAKE 1 TABLET DAILY, Disp: 90 tablet, Rfl: 3    ferrous sulfate 325 (65 Fe) mg tablet, Take 1 tablet (325 mg total) by mouth daily with breakfast (Patient not taking: Reported on 3/12/2020), Disp: 30 tablet, Rfl: 0    Laboratory Results:        Invalid input(s): ALBUMIN    Results for orders placed or performed during the hospital encounter of 03/06/20   Prepare Leukoreduced RBC: 2 Units, Irradiated, Leukoreduced   Result Value Ref Range    Unit Product Code A8625U15     Unit Number [id number]     Unit ABO O     Unit [facility] POS     Crossmatch Compatible     Unit Dispense Status Presumed Trans     Unit Product Code Q9187W30     Unit Number [id number]     Unit ABO O     Unit RH POS     Crossmatch Compatible     Unit Dispense Status Presumed Trans    Direct antiglobulin test   Result Value Ref Range    DIRECT FITO 2+  Positive    PAM IGG   Result Value Ref Range    PAM IgG 2+  Positive    Anti-complement   Result Value Ref Range    PAM C3 Negative    Type and screen   Result Value Ref Range    ABO Grouping O     Rh Factor Positive     Antibody Screen Positive     Specimen Expiration Date 20200309    Antibody identification   Result Value Ref Range    ANTIBODY ID  #1 Anti-E      *Note: Due to a large number of results and/or encounters for the requested time period, some results have not been displayed  A complete set of results can be found in Results Review

## 2020-03-17 NOTE — RESULT ENCOUNTER NOTE
Hello    Patient normally is followed up by Ms Monse Montague  Can you please let the patient know that the ultrasound does not show any clotting in the right arm  If the swelling gets worse, please have the patient call      Thank you    np

## 2020-03-18 NOTE — TELEPHONE ENCOUNTER
----- Message from Tara Arenas MD sent at 3/17/2020  4:32 PM EDT -----  Hello    Patient normally is followed up by Ms Eric Ross  Can you please let the patient know that the ultrasound does not show any clotting in the right arm  If the swelling gets worse, please have the patient call      Thank you    np

## 2020-03-26 NOTE — TELEPHONE ENCOUNTER
----- Message from Karma Brennan MD sent at 3/25/2020  5:26 PM EDT -----  Hello    Patient normally is followed up by Ms Arcadio Juarez  Pt was [facility] to have [facility] this [facility]    - can you please find out when she is going for this  - she needs to wear a mask and gloves when she goes and wash hands frequently when outside       Thank you    np

## 2020-03-27 NOTE — TELEPHONE ENCOUNTER
I called patient to review her lab results, and she explained that she is having severe abdominal pain and discomfort  She has not made a bowel movement in a few days, and she tried taking Miralax today and it has not helped yet  She said the pain started today, and the pain is in her upper stomach and mid abdomen as well  She said it is a level 9 in pain, and it is tender to the touch  She is passing gas, and denies seeing any blood in her stool recently  She denies a fever, but the pain is bad it is hard for her to move at all  She said that she is nauseous, and ate breakfast this morning  She does have Zofran at home but has not tried it yet  She has been trying to stay hydrated  She has a hemoglobin level of 6 9 today, and the infusion center is going to be calling her to schedule an infusion ASAP  I let her know we would call her back as soon as we could  I discussed with Dr Mitzy Shea, and she advised patient to try taking Senna and prune juice, and if that does not relieve her bowels, she is present to the ER for an evaluation

## 2020-03-27 NOTE — TELEPHONE ENCOUNTER
Spoke with the patient   Pt states abd pain improving after bowel movement today (reviewed earlier notes from PCP and Heme teams)    - EDUARDO vs progression of renal dysfunction  - Hb low - to be set up for transfusion with Primary Team  - tac level is not elevated  - repeat labs next week after transfusion  - advised pt to come to ER right away if any symptoms worsening  - UA pyuria, monitor for now

## 2020-03-27 NOTE — RESULT ENCOUNTER NOTE
Hello    Patient normally is followed up by Ms Blake Sánchez       Can you please send pt lab slips for CBC, BMP, UA, UPCR to complete at the pRBC transfusion next week at infusion scheduled by Hematology    Thank you    np

## 2020-03-27 NOTE — PROGRESS NOTES
Call w/critical labs-Hg=6 9  Per Nacho Villela PA-C-phone call w/pt  Plan one unit of PRBC's as soon as able to be scheduled  Call to [facility]-plan Wed, 4 1 @ 1230  Plan T&S to be drawn on 3 30 20 at [facility] in the morning  Pt is to then go to the MOB at [facility] to sign blood consent at the [facility] office  Pt has antibodies/per blood bank need two days for blood prep prior to transfusion  Reviewed above w/Ramon Madrid as scheduled  Pt has an OV on 4 8 20/Dr Liz Sheriff  Call to pt-reviewed all of the above at length w/the pt  Pt  Dharmesh Pa understanding the information above and in agreement with planning

## 2020-03-30 NOTE — TELEPHONE ENCOUNTER
----- Message from Bharath Man MD sent at 3/27/2020  6:34 PM EDT -----  Hello    Patient normally is followed up by Ms Tej Nicole       Can you please send pt lab slips for CBC, BMP, UA, UPCR to complete at the pRBC transfusion next week at infusion scheduled by Hematology    Thank you    np

## 2020-03-30 NOTE — TELEPHONE ENCOUNTER
Patient called to verify where can she can to sign a consent for labs - spoke with Surendra Notice ans was advise patient should go to the office at [facility]

## 2020-03-31 NOTE — PROGRESS NOTES
Discharge Note  Patrick Spain has made good functional progress with physical therapy  she was educated and updated in her home exercise program  Neymal Stephan will be discharged from formal therapy due to independence in HEP and COVID-19 but will follow up as needed

## 2020-03-31 NOTE — TELEPHONE ENCOUNTER
----- Message from Gavin Acevedo MD sent at 3/31/2020  1:28 PM EDT -----  Hello    Patient normally is followed up by Ms Mary Flores  Can you please see if pt has any urinary complaints   Please let me know  - please also ask lab to add urine culture to yest UA    Thank you    np

## 2020-03-31 NOTE — RESULT ENCOUNTER NOTE
Hello    Patient normally is followed up by Ms Lucrecia Jonas  Can you please see if pt has any urinary complaints   Please let me know  - please also ask lab to add urine culture to yest UA    Thank you    np

## 2020-04-21 PROBLEM — F33.0 MAJOR DEPRESSIVE DISORDER, RECURRENT EPISODE, MILD (HCC): Chronic | Status: ACTIVE | Noted: 2017-03-02

## 2020-05-06 PROBLEM — D63.1 ANEMIA IN STAGE 3 CHRONIC KIDNEY DISEASE (HCC): Status: ACTIVE | Noted: 2020-01-01

## 2020-05-06 PROBLEM — N18.30 ANEMIA IN STAGE 3 CHRONIC KIDNEY DISEASE (HCC): Status: ACTIVE | Noted: 2020-01-01

## 2020-05-14 PROBLEM — N18.6 ESRD (END STAGE RENAL DISEASE) (HCC): Status: ACTIVE | Noted: 2020-01-01

## 2020-05-25 PROBLEM — I50.33 ACUTE ON CHRONIC DIASTOLIC CONGESTIVE HEART FAILURE (HCC): Status: RESOLVED | Noted: 2019-12-24 | Resolved: 2020-01-01

## 2020-05-25 PROBLEM — S16.1XXA NECK STRAIN: Status: RESOLVED | Noted: 2018-02-16 | Resolved: 2020-01-01

## 2020-05-25 PROBLEM — R78.81 BACTEREMIA: Status: RESOLVED | Noted: 2019-12-13 | Resolved: 2020-01-01

## 2020-05-25 PROBLEM — R21 SKIN RASH: Status: RESOLVED | Noted: 2019-08-14 | Resolved: 2020-01-01

## 2020-05-25 PROBLEM — L03.213 PERIORBITAL CELLULITIS OF LEFT EYE: Status: RESOLVED | Noted: 2018-07-23 | Resolved: 2020-01-01

## 2020-05-25 PROBLEM — R21 RASH: Status: RESOLVED | Noted: 2019-07-09 | Resolved: 2020-01-01

## 2020-05-25 PROBLEM — N17.9 ACUTE RENAL FAILURE SUPERIMPOSED ON STAGE 4 CHRONIC KIDNEY DISEASE (HCC): Status: RESOLVED | Noted: 2019-08-14 | Resolved: 2020-01-01

## 2020-05-25 PROBLEM — N18.4 ACUTE RENAL FAILURE SUPERIMPOSED ON STAGE 4 CHRONIC KIDNEY DISEASE (HCC): Status: RESOLVED | Noted: 2019-08-14 | Resolved: 2020-01-01

## 2020-05-25 PROBLEM — L01.00 IMPETIGO: Status: RESOLVED | Noted: 2019-03-26 | Resolved: 2020-01-01

## 2020-06-05 PROBLEM — N18.30 ANEMIA IN STAGE 3 CHRONIC KIDNEY DISEASE (HCC): Status: RESOLVED | Noted: 2020-01-01 | Resolved: 2020-01-01

## 2020-06-05 PROBLEM — D63.1 ANEMIA IN STAGE 3 CHRONIC KIDNEY DISEASE (HCC): Status: RESOLVED | Noted: 2020-01-01 | Resolved: 2020-01-01

## 2020-06-05 PROBLEM — D47.2 MGUS (MONOCLONAL GAMMOPATHY OF UNKNOWN SIGNIFICANCE): Chronic | Status: RESOLVED | Noted: 2017-11-16 | Resolved: 2020-01-01

## 2020-06-17 NOTE — H&P (VIEW-ONLY)
Cardiology Follow Up    Valeria Back  1965  [id number]  [facility]  [address] [place]  [address]  [phone]  [phone]    1  Pure hypercholesterolemia     2  Benign hypertension with CKD (chronic kidney disease) stage IV [facility])  Ambulatory referral to Cardiology   3  Status post kidney transplant  Ambulatory referral to Cardiology   4  Chronic diastolic congestive heart failure (HCC)     5  Paroxysmal atrial fibrillation (HCC)  POCT ECG   6  Nonrheumatic aortic valve insufficiency  Echo complete with contrast if indicated   7  Subclavian artery stenosis, left (HCC)     8  Renal transplant recipient     5  Peripheral vascular disease ([address] )         Discussion: She has not had any chest discomfort or dyspnea  There is no evidence of myocardial ischemia now or in the past, and no evidence of recent decompensated heart failure  She is scheduled for fistula placement  There are no cardiac contraindications to anesthesia or surgery  I would like to see her again and follow-up in one year with a lipid panel and echocardiogram prior to that visit  Cardiovascular History:  I first saw Ms Kanchan Flynn in 6/20  She has a wooded valvular insufficiency  An echo in 7/18 disclosed mild LVH within EF of 60 and grade I diastolic dysfunction  LV JIM was 4 7  There was also mild left atrial enlargement and moderate aortic valve insufficiency  She has multiple risk factors for coronary disease including long-standing juvenile diabetes, hypertension, dyslipidemia, and CKD  However there is no history of known ischemic heart disease  SPECT perfusion imaging in 12/15 was normal     Although her problem list includes diastolic heart failure and atrial fibrillation, I cannot find any documentation of these  She has multiple medical problems   She had juvenile diabetes and renal failure, and underwent renal and pancreas transplant at [facility] in 1998  She has suffered progressive deterioration of renal function and in 5/20 had to begin hemodialysis  She has multiple myeloma, diagnosed in 2020, not in remission           Patient Active Problem List   Diagnosis    Renal transplant recipient    Chronic bacteriuria, likely colonization    Acquired hypothyroidism    Hypertension    Colitis    Diabetes mellitus type I, controlled ([place] [address] )    Anemia    Status post kidney transplant    Immunosuppression ([place] [address] )    Weakness    Chronic diastolic congestive heart failure [facility]    Urinary incontinence    Calculus, bladder, diverticulum    Hypercalcemia    Chronic constipation    Atrial fibrillation ([facility]    Kaiser esophagus    Cataract    Cocaine abuse in remission ([place] [address] )    Gastric and duodenal disease    Diabetic nephropathy ([facility]    Diabetic polyneuropathy ([facility]    Retinopathy, diabetic, bilateral ([place] [address] )    Diastolic dysfunction    Essential and other specified forms of tremor    Failure of pancreas transplant    FELIPE (generalized anxiety disorder)    Hyperlipidemia    Irritable bowel syndrome    Major depressive disorder, recurrent episode, mild ([facility]    Aortic regurgitation    OAB (overactive bladder)    Osteoporosis    Peripheral vascular disease ([facility]    Post-traumatic stress disorder, chronic    Restless legs syndrome    Secondary hyperparathyroidism, renal ([place] [address] )    Neuropathy in diabetes ([place] [address] )    History of herpes zoster    Subclavian artery stenosis, left ([facility]    Abnormal ECG    Multiple myeloma not having achieved remission ([facility])    Chronic kidney disease-mineral and bone disorder    Ambulatory dysfunction    Low bicarbonate    Urinary retention    Asymptomatic bacteriuria    Hyperphosphatemia    Acquired keratoderma    Disorder of bone and articular cartilage    ESRD (end stage renal disease) ([facility])     Past Medical History:   Diagnosis Date    Abnormal liver function test     Acute kidney injury ([place] [address] )     Acute on chronic congestive heart failure (HCC)     Allergic urticaria     Anemia     Cancer (HCC)     Multiple myeloma    Cervical dysplasia     Cholelithiasis     Chronic diastolic (congestive) heart failure ([facility] ) 2017    Diabetes mellitus ([place] [address] )     Previous, controlled with diet    Diabetes mellitus with foot ulcer ([facility] )     Disease of thyroid gland     Encephalopathy     Hematuria     + leak est- secondary to UTIs/panc drainage    History of transfusion     Hyperkalemia     Hypertension     Iliotibial band syndrome     Lumbar radiculopathy     Multiple myeloma (HCC)     Multiple myeloma (HCC)     Night blindness     Nonrheumatic aortic (valve) insufficiency     Pneumonia     Renal disorder     Retinopathy     Seborrhea     Seizure ([facility] )     Shingles     Sinus tachycardia     B blocker - cardio echo stress test 02 normal/neg LE doppler  OK and     Status post simultaneous kidney and pancreas transplant ([place] [address] )     Toe amputation status     Trochanteric bursitis      Social History     Socioeconomic History    Marital status: Legally      Spouse name: Not on file    Number of children: 0    Years of education: 2 years of college    Highest education level: Some college, no degree   Occupational History    Occupation: On disability   Social Needs    Financial resource strain: Somewhat hard    Food insecurity:     Worry: Sometimes true     Inability: Sometimes true    Transportation needs:     Medical: No     Non-medical: No   Tobacco Use    Smoking status: Former Smoker     Last attempt to quit: 2012     Years since quittin 1    Smokeless tobacco: Never Used   Substance and Sexual Activity    Alcohol use: Never     Frequency: Never     Binge frequency: Never     Comment: (history)    Drug use: Never     Types: Hydrocodone     Comment: Past cocaine use many years ago - no current use    Sexual activity: Not Currently   Lifestyle    Physical activity:     Days per week: 4 days     Minutes per session: 30 min    Stress: Rather much   Relationships    Social connections:     Talks on phone: More than three times a week     Gets together: More than three times a week     Attends Faith service: Never     Active member of club or organization: No     Attends meetings of clubs or organizations: Never     Relationship status:     Intimate partner violence:     Fear of current or ex partner: No     Emotionally abused: No     Physically abused: No     Forced sexual activity: No   Other Topics Concern    Not on file   Social History Narrative    Education: some college    Learning Disabilities: none    Marital History:     Children: none    Living Arrangement: lives alone    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, on permanent disability    Functioning Relationships: limited support system    Legal History: none     History: None      Family History   Problem Relation Age of Onset    Hypertension Mother     Cancer Mother     Hypertension Father     Cancer Father     Cancer Maternal Grandfather     Cancer Paternal Grandmother     Cancer Paternal Grandfather     Depression Sister     Breast cancer Maternal Grandmother 77     Past Surgical History:   Procedure Laterality Date    CATARACT EXTRACTION      CHOLECYSTECTOMY      COLONOSCOPY      two polyps in the rectum removed and biopsied diverticulosis in the sigmoid colon, external hemorrhoiods- Dr Barfield    COMBINED KIDNEY-PANCREAS TRANSPLANT N/A     CT BONE MARROW BIOPSY AND ASPIRATION  4/17/2019    CYSTOSCOPY N/A 10/13/2016    Procedure: CYSTOSCOPY, retrograde pyelogram, biopsy of ureteral polyp; Surgeon: Astrid Man MD;  Location: [facility];  Service:    Dio Saez DILATION AND CURETTAGE OF UTERUS      ESOPHAGOGASTRODUODENOSCOPY N/A 11/20/2017    Procedure: ESOPHAGOGASTRODUODENOSCOPY (EGD); Surgeon: Charlie Wright DO;  Location: [facility];   Service: Gastroenterology    EYE SURGERY      cataracts    FOOT AMPUTATION THROUGH METATARSAL Left     FOOT SURGERY Right     excision of metatarsal heads    HALLUX VALGUS CORRECTION Right     IR [address] PLACEMENT  5/5/2020    NEPHRECTOMY TRANSPLANTED ORGAN      PANCREATIC TRANSPLANT REMOVAL  1998       Current Outpatient Medications:     amLODIPine (NORVASC) 10 mg tablet, Take 1 tablet (10 mg total) by mouth daily at bedtime, Disp: 30 tablet, Rfl: 0    ARIPiprazole (ABILIFY) 30 mg tablet, Take 1 tablet (30 mg total) by mouth daily at bedtime, Disp: 90 tablet, Rfl: 2    aspirin 81 MG tablet, Take 1 tablet by mouth daily, Disp: , Rfl:     busPIRone (BUSPAR) 10 mg tablet, Take 1 tablet (10 mg total) by mouth 2 (two) times a day, Disp: 180 tablet, Rfl: 2    Cholecalciferol (VITAMIN D3) 1000 units CAPS, Take 3 capsules by mouth daily  , Disp: , Rfl:     doxazosin (CARDURA) 2 mg tablet, Take 2 tablets (4 mg total) by mouth daily at bedtime, Disp: 180 tablet, Rfl: 3    DULoxetine (CYMBALTA) 60 mg delayed release capsule, Take 1 capsule (60 mg total) by mouth 2 (two) times a day, Disp: 180 capsule, Rfl: 2    folic acid (FOLVITE) 1 mg tablet, TAKE 1 TABLET BY MOUTH EVERY DAY AS DIRECTED, Disp: 90 tablet, Rfl: 3    glucose blood (ONE TOUCH ULTRA TEST) test strip, 1 each by Other route 4 (four) times a day Use as instructed, Disp: 400 each, Rfl: 1    hydrALAZINE (APRESOLINE) 100 MG tablet, Take 1 tablet (100 mg total) by mouth 3 (three) times a day, Disp: 270 tablet, Rfl: 0    insulin glargine (Toujeo SoloStar) 300 units/mL CONCETRATED U-300 injection pen (1-unit dial), Inject 5 Units under the skin daily at bedtime (Patient taking differently: 6 Units Inject 5 Units under the skin daily at bedtime), Disp: 3 pen, Rfl: 1    insulin lispro (HumaLOG KwikPen) 100 units/mL injection pen, Use up to 20 units daily via sliding scale, Disp: 5 pen, Rfl: 1    Insulin Pen Needle (BD PEN NEEDLE VISHNU U/F) 32G X 4 MM MISC, Use 4 times daily, Disp: 400 each, Rfl: 1    Lancets (ONETOUCH ULTRASOFT) lancets, by Does not apply route 4 (four) times a day  , Disp: , Rfl:     lenalidomide (REVLIMID) 2 5 MG CAPS, Take 2 5 mg every other day auth # [id number] 5/26/20, Disp: 14 capsule, Rfl: 0    levothyroxine 125 mcg tablet, TAKE 1 TABLET BY MOUTH EVERY DAY, Disp: 90 tablet, Rfl: 0    LORazepam (ATIVAN) 2 mg tablet, Take 1 tablet (2 mg total) by mouth 3 (three) times a day as needed for anxiety, Disp: 90 tablet, Rfl: 4    metoprolol tartrate (LOPRESSOR) 50 mg tablet, TAKE 1 TABLET TWICE DAILY, Disp: 180 tablet, Rfl: 0    pravastatin (PRAVACHOL) 80 mg tablet, TAKE 1 TABLET BY MOUTH DAILY, Disp: 90 tablet, Rfl: 5    predniSONE 5 mg tablet, Take 1 tablet (5 mg total) by mouth daily, Disp: 90 tablet, Rfl: 3    rOPINIRole (REQUIP) 0 25 mg tablet, TAKE 1 TABLET BY MOUTH TWICE DAILY, Disp: 180 tablet, Rfl: 1    sertraline (ZOLOFT) 100 mg tablet, Take 2 tablets (200 mg total) by mouth daily, Disp: 180 tablet, Rfl: 2    sevelamer (RENAGEL) 800 mg tablet, Take 2 tablets (1,600 mg total) by mouth 3 (three) times a day with meals, Disp: 90 tablet, Rfl: 1    sodium bicarbonate 650 mg tablet, Take 3 tablets (1,950 mg total) by mouth 3 (three) times a day, Disp: 180 tablet, Rfl: 0    tacrolimus (PROGRAF) 1 mg capsule, TAKE 2 CAPSULES BY MOUTH EVERY 12 HOURS, Disp: 120 capsule, Rfl: 0    torsemide (DEMADEX) 20 mg tablet, TAKE 1 TABLET DAILY, Disp: 90 tablet, Rfl: 3  Allergies   Allergen Reactions    Ixazomib Rash     Ninlaro    Revlimid [Lenalidomide] Rash    Cefadroxil      Tolerated cefepime 2019    Morphine Other (See Comments)     Other reaction(s): Other (See Comments)  projectile vomiting    Morphine And Related GI Intolerance    Myrbetriq [Mirabegron] Hives    Penicillins Hives     Other reaction(s):  Other (See Comments)  Respiratory Distress,hives  Tolerates cefazolin       Labs:  Appointment on 06/03/2020   Component Date Value    WBC 06/03/2020 5 69     RBC 06/03/2020 2 61*    Hemoglobin 06/03/2020 8 1*    Hematocrit 06/03/2020 27 1*    MCV 06/03/2020 104*    MCH 06/03/2020 31 0     MCHC 06/03/2020 29 9*    RDW 06/03/2020 17 9*    MPV 06/03/2020 12 1     Platelets 37/83/8523 184     nRBC 06/03/2020 0     Neutrophils Relative 06/03/2020 63     Immat GRANS % 06/03/2020 0     Lymphocytes Relative 06/03/2020 18     Monocytes Relative 06/03/2020 9     Eosinophils Relative 06/03/2020 9*    Basophils Relative 06/03/2020 1     Neutrophils Absolute 06/03/2020 3 53     Immature Grans Absolute 06/03/2020 0 02     Lymphocytes Absolute 06/03/2020 1 02     Monocytes Absolute 06/03/2020 0 53     Eosinophils Absolute 06/03/2020 0 51     Basophils Absolute 06/03/2020 0 08    Ancillary Orders on 05/29/2020   Component Date Value    Magnesium 06/03/2020 2 2     Phosphorus 06/03/2020 4 2     Creatinine, Ur 06/03/2020 31 5     Protein Urine Random 06/03/2020 151     Prot/Creat Ratio, Ur 06/03/2020 4 79*    TACROLIMUS 06/03/2020 5 6     Clarity, UA 06/03/2020 Cloudy     Color, UA 06/03/2020 Yellow     Specific Gravity, UA 06/03/2020 1 011     pH, UA 06/03/2020 8 5*    Glucose, UA 06/03/2020 Negative     Ketones, UA 06/03/2020 Negative     Blood, UA 06/03/2020 Negative     Protein, UA 06/03/2020 100 (2+)*    Nitrite, UA 06/03/2020 Negative     Bilirubin, UA 06/03/2020 Negative     Urobilinogen, UA 06/03/2020 0 2     Leukocytes, UA 06/03/2020 Large*    WBC, UA 06/03/2020 Innumerable*    RBC, UA 06/03/2020 4-10*    Hyaline Casts, UA 06/03/2020 5-10*    Bacteria, UA 06/03/2020 Occasional     Epithelial Cells 06/03/2020 None Seen    Admission on 05/14/2020, Discharged on 05/17/2020   Component Date Value    Sodium 05/14/2020 142     Potassium 05/14/2020 3 1*    Chloride 05/14/2020 105     CO2 05/14/2020 24     ANION GAP 05/14/2020 13     BUN 05/14/2020 58*    Creatinine 05/14/2020 3 89*    Glucose 05/14/2020 102     Calcium 05/14/2020 8 4     AST 05/14/2020 20     ALT 05/14/2020 8*    Alkaline Phosphatase 05/14/2020 96     Total Protein 05/14/2020 7 2     Albumin 05/14/2020 3 0*    Total Bilirubin 05/14/2020 0 66     eGFR 05/14/2020 12     WBC 05/14/2020 6 36     RBC 05/14/2020 2 73*    Hemoglobin 05/14/2020 8 6*    Hematocrit 05/14/2020 27 7*    MCV 05/14/2020 102*    MCH 05/14/2020 31 5     MCHC 05/14/2020 31 0*    RDW 05/14/2020 20 8*    MPV 05/14/2020 12 4     Platelets 80/00/1234 184     nRBC 05/14/2020 0     Neutrophils Relative 05/14/2020 65     Immat GRANS % 05/14/2020 1     Lymphocytes Relative 05/14/2020 17     Monocytes Relative 05/14/2020 12     Eosinophils Relative 05/14/2020 2     Basophils Relative 05/14/2020 3*    Neutrophils Absolute 05/14/2020 4 20     Immature Grans Absolute 05/14/2020 0 06     Lymphocytes Absolute 05/14/2020 1 07     Monocytes Absolute 05/14/2020 0 73     Eosinophils Absolute 05/14/2020 0 14     Basophils Absolute 05/14/2020 0 16*    Protime 05/14/2020 15 3*    INR 05/14/2020 1 28*    PTT 05/14/2020 24     Color, UA 05/14/2020 Light Yellow     Clarity, UA 05/14/2020 Slightly Cloudy     Specific Gravity, UA 05/14/2020 1 010     pH, UA 05/14/2020 8 5*    Leukocytes, UA 05/14/2020 Large*    Nitrite, UA 05/14/2020 Negative     Protein, UA 05/14/2020 30 (1+)*    Glucose, UA 05/14/2020 Negative     Ketones, UA 05/14/2020 Negative     Urobilinogen, UA 05/14/2020 0 2     Bilirubin, UA 05/14/2020 Negative     Blood, UA 05/14/2020 Trace-Intact*    LACTIC ACID 05/14/2020 1 5     Troponin I 05/14/2020 0 08*    Lipase 05/14/2020 34*    Color, UA 05/14/2020 Yellow     Clarity, UA 05/14/2020 Cloudy     pH, UA 05/14/2020 8 5*    Leukocytes, UA 05/14/2020 Large*    Nitrite, UA 05/14/2020 Negative     Protein, UA 05/14/2020 100 (2+)*    Glucose, UA 05/14/2020 Negative     Ketones, UA 05/14/2020 Negative     Urobilinogen, UA 05/14/2020 0 2     Bilirubin, UA 05/14/2020 Negative     Blood, UA 05/14/2020 Trace*    Specific Gravity, UA 05/14/2020 1 015     RBC, UA 05/14/2020 1-2*    WBC, UA 05/14/2020 Innumerable*    Epithelial Cells 05/14/2020 Occasional     Bacteria, UA 05/14/2020 Moderate*    Urine Culture 05/14/2020 >100,000 cfu/ml Vancomycin Resistant Enterococcus faecium*    Urine Culture 05/14/2020 10,000-19,000 cfu/ml      pH, Festus 05/14/2020 7 426*    pCO2, Festus 05/14/2020 36 0*    pO2, Festus 05/14/2020 154 1*    HCO3, Festus 05/14/2020 23 1*    Base Excess, Festus 05/14/2020 -1 0     O2 Content, Festus 05/14/2020 12 5     O2 HGB, VENOUS 05/14/2020 96 2*    BETA-HYDROXYBUTYRATE 05/14/2020 0 1     Blood Culture 05/14/2020 No Growth After 5 Days   Blood Culture 05/14/2020 No Growth After 5 Days       SARS-CoV-2 05/14/2020 Negative     Ventricular Rate 05/14/2020 92     Atrial Rate 05/14/2020 92     MT Interval 05/14/2020 154     QRSD Interval 05/14/2020 94     QT Interval 05/14/2020 418     QTC Interval 05/14/2020 516     P Axis 05/14/2020 60     QRS Axis 05/14/2020 -42     T Wave Axis 05/14/2020 98     C difficile toxin by PCR 05/15/2020 Negative     Troponin I 05/14/2020 0 05*    Troponin I 05/14/2020 0 05*    Platelets 48/40/4187 163     MPV 05/14/2020 11 0     POC Glucose 05/14/2020 109     POC Glucose 05/14/2020 119     Hemoglobin A1C 05/15/2020 4 2*    Estimated Average Glucose 05/15/2020 74     Sodium 05/15/2020 142     Potassium 05/15/2020 3 6     Chloride 05/15/2020 107     CO2 05/15/2020 22     ANION GAP 05/15/2020 13     BUN 05/15/2020 53*    Creatinine 05/15/2020 3 81*    Glucose 05/15/2020 78     Calcium 05/15/2020 8 2*    eGFR 05/15/2020 13     WBC 05/15/2020 5 31     RBC 05/15/2020 2 36*    Hemoglobin 05/15/2020 7 6*    Hematocrit 05/15/2020 24 6*    MCV 05/15/2020 104*    MCH 05/15/2020 32 2     MCHC 05/15/2020 30 9*    RDW 05/15/2020 20 5*    MPV 05/15/2020 10 9     Platelets 30/98/3095 160  nRBC 05/15/2020 0     Neutrophils Relative 05/15/2020 58     Immat GRANS % 05/15/2020 1     Lymphocytes Relative 05/15/2020 24     Monocytes Relative 05/15/2020 12     Eosinophils Relative 05/15/2020 2     Basophils Relative 05/15/2020 3*    Neutrophils Absolute 05/15/2020 3 05     Immature Grans Absolute 05/15/2020 0 04     Lymphocytes Absolute 05/15/2020 1 28     Monocytes Absolute 05/15/2020 0 65     Eosinophils Absolute 05/15/2020 0 13     Basophils Absolute 05/15/2020 0 16*    Magnesium 05/15/2020 2 1     POC Glucose 05/15/2020 82     POC Glucose 05/15/2020 102     POC Glucose 05/15/2020 100     POC Glucose 05/15/2020 83     WBC 05/16/2020 6 43     RBC 05/16/2020 2 33*    Hemoglobin 05/16/2020 7 4*    Hematocrit 05/16/2020 24 1*    MCV 05/16/2020 103*    MCH 05/16/2020 31 8     MCHC 05/16/2020 30 7*    RDW 05/16/2020 19 9*    MPV 05/16/2020 10 9     Platelets 99/96/0084 151     nRBC 05/16/2020 0     Neutrophils Relative 05/16/2020 54     Immat GRANS % 05/16/2020 1     Lymphocytes Relative 05/16/2020 23     Monocytes Relative 05/16/2020 11     Eosinophils Relative 05/16/2020 8*    Basophils Relative 05/16/2020 3*    Neutrophils Absolute 05/16/2020 3 55     Immature Grans Absolute 05/16/2020 0 05     Lymphocytes Absolute 05/16/2020 1 46     Monocytes Absolute 05/16/2020 0 70     Eosinophils Absolute 05/16/2020 0 50     Basophils Absolute 05/16/2020 0 17*    Sodium 05/16/2020 138     Potassium 05/16/2020 3 7     Chloride 05/16/2020 104     CO2 05/16/2020 24     ANION GAP 05/16/2020 10     BUN 05/16/2020 35*    Creatinine 05/16/2020 3 23*    Glucose 05/16/2020 96     Calcium 05/16/2020 7 6*    eGFR 05/16/2020 15     POC Glucose 05/16/2020 104     POC Glucose 05/16/2020 106     POC Glucose 05/16/2020 104     POC Glucose 05/16/2020 93     POC Glucose 05/17/2020 88     POC Glucose 05/17/2020 116    Hospital Outpatient Visit on 05/08/2020   Component Date Value    Unit Product Code 05/09/2020 [id number]     Unit Number 05/09/2020 [id number]     Unit ABO 05/09/2020 O     Unit RH 05/09/2020 POS     Crossmatch 05/09/2020 Compatible     Unit Dispense Status 05/09/2020 Presumed Trans    Appointment on 05/07/2020   Component Date Value    ABO Grouping 05/07/2020 O     Rh Factor 05/07/2020 Positive     Antibody Screen 05/07/2020 Positive     Specimen Expiration Date 05/07/2020 [id number]     ANTIBODY ID  #1 05/07/2020 Cold Auto Antibody     ANTIBODY ID  #2 05/07/2020 Anti-E     ANTIBODY ID   #3 05/07/2020 Warm Auto Antibody    Lab on 05/06/2020   Component Date Value    Protime 05/06/2020 14 3     INR 05/06/2020 1 15     Sodium 05/06/2020 138     Potassium 05/06/2020 3 8     Chloride 05/06/2020 107     CO2 05/06/2020 20*    ANION GAP 05/06/2020 11     BUN 05/06/2020 62*    Creatinine 05/06/2020 4 28*    Glucose, Fasting 05/06/2020 98     Calcium 05/06/2020 8 6     AST 05/06/2020 14     ALT 05/06/2020 12     Alkaline Phosphatase 05/06/2020 89     Total Protein 05/06/2020 7 5     Albumin 05/06/2020 3 5     Total Bilirubin 05/06/2020 0 72     eGFR 05/06/2020 11     WBC 05/06/2020 4 10*    RBC 05/06/2020 1 99*    Hemoglobin 05/06/2020 6 6*    Hematocrit 05/06/2020 21 2*    MCV 05/06/2020 107*    MCH 05/06/2020 33 2     MCHC 05/06/2020 31 1*    RDW 05/06/2020 18 4*    Platelets 04/19/6141 136*    MPV 05/06/2020 13 1*    Hepatitis B Surface Ag 05/06/2020 Non-reactive     Hep B S Ab 05/06/2020 <3 10     Hepatitis C Ab 05/06/2020 Non-reactive     Hep B C IgM 05/06/2020 Non-reactive     Hep B Core Total Ab 05/06/2020 [facility] Outpatient Visit on 05/05/2020   Component Date Value    Protime 05/05/2020 14 4     INR 05/05/2020 1 19    Ancillary Orders on 05/01/2020   Component Date Value    Magnesium 05/06/2020 2 4     Phosphorus 05/06/2020 5 3*    Creatinine, Ur 05/06/2020 53 0     Protein Urine Random 05/06/2020 85  Prot/Creat Ratio, Ur 05/06/2020 1 60*    TACROLIMUS 05/06/2020 2 0     Clarity, UA 05/06/2020 Clear     Color, UA 05/06/2020 Yellow     Specific Gravity, UA 05/06/2020 1 011     pH, UA 05/06/2020 7 5     Glucose, UA 05/06/2020 Negative     Ketones, UA 05/06/2020 Negative     Blood, UA 05/06/2020 Negative     Protein, UA 05/06/2020 30 (1+)*    Nitrite, UA 05/06/2020 Negative     Bilirubin, UA 05/06/2020 Negative     Urobilinogen, UA 05/06/2020 0 2     Leukocytes, UA 05/06/2020 Large*    WBC, UA 05/06/2020 20-30*    RBC, UA 05/06/2020 None Seen     Hyaline Casts, UA 05/06/2020 10-25*    Bacteria, UA 05/06/2020 None Seen     Epithelial Cells 05/06/2020 None Seen    Lab on 04/28/2020   Component Date Value    WBC 04/28/2020 5 72     RBC 04/28/2020 2 32*    Hemoglobin 04/28/2020 7 5*    Hematocrit 04/28/2020 23 7*    MCV 04/28/2020 102*    MCH 04/28/2020 32 3     MCHC 04/28/2020 31 6     RDW 04/28/2020 18 7*    MPV 04/28/2020 12 0     Platelets 06/15/5686 149     nRBC 04/28/2020 0     Neutrophils Relative 04/28/2020 42*    Immat GRANS % 04/28/2020 1     Lymphocytes Relative 04/28/2020 19     Monocytes Relative 04/28/2020 15*    Eosinophils Relative 04/28/2020 19*    Basophils Relative 04/28/2020 4*    Neutrophils Absolute 04/28/2020 2 40     Immature Grans Absolute 04/28/2020 0 07     Lymphocytes Absolute 04/28/2020 1 08     Monocytes Absolute 04/28/2020 0 84     Eosinophils Absolute 04/28/2020 1 11*    Basophils Absolute 04/28/2020 0 22*    IGA 04/28/2020 120 0     IGG 04/28/2020 1,930 0*    IGM 04/28/2020 59 0     A/G Ratio 04/28/2020 1 05*    Albumin Electrophoresis 04/28/2020 51 2*    Albumin CONC 04/28/2020 3 79     Alpha 1 04/28/2020 5 1*    ALPHA 1 CONC 04/28/2020 0 38     Alpha 2 04/28/2020 7 9     ALPHA 2 CONC 04/28/2020 0 58     Beta-1 04/28/2020 4 5*    BETA 1 CONC 04/28/2020 0 33*    Beta-2 04/28/2020 9 5*    BETA 2 CONC 04/28/2020 0 70*    Gamma Globulin 04/28/2020 21 8     GAMMA CONC 04/28/2020 1 61*    M Peak ID 1 04/28/2020 5 40     M PEAK 1 CONC 04/28/2020 0 40     M Peak ID 2 04/28/2020 13 10     M Peak 2 CONC 04/28/2020 0 97     Total Protein 04/28/2020 7 4     SPEP Interpretation 04/28/2020 The SPEP shows a biclonal gammopathy  Previous immunofixation on 1/08/20 identified two IgG kappa bands  Reviewed by: Emory Contreras MD ([id number]) **Electronic Signature**     Sodium 04/28/2020 136     Potassium 04/28/2020 4 2     Chloride 04/28/2020 107     CO2 04/28/2020 24     ANION GAP 04/28/2020 5     BUN 04/28/2020 58*    Creatinine 04/28/2020 4 22*    Glucose, Fasting 04/28/2020 92     Calcium 04/28/2020 9 4     AST 04/28/2020 11     ALT 04/28/2020 7*    Alkaline Phosphatase 04/28/2020 96     Total Protein 04/28/2020 7 8     Albumin 04/28/2020 3 4*    Total Bilirubin 04/28/2020 0 80     eGFR 04/28/2020 11     TACROLIMUS 04/28/2020 3 2     Magnesium 04/28/2020 2 6     Phosphorus 04/28/2020 4 2    Ancillary Orders on 04/24/2020   Component Date Value    Ig Kappa Free Light Chain 04/28/2020 116 2*    Ig Lambda Free Light Clari* 04/28/2020 71 6*    Kappa/Lambda FluidC Ratio 04/28/2020 1 62    There may be more visits with results that are not included  Imaging: No results found  Review of Systems:  ROS    Vitals:    06/17/20 1056   BP: (!) 130/40   Pulse: (!) 54   Temp: 98 4 °F (36 9 °C)     Weight (last 2 days)     Date/Time   Weight    06/17/20 1056   89 2 (196 7)              Physical Exam:  Physical Exam   Constitutional: She is oriented to person, place, and time  She appears well-developed and well-nourished  No distress  HENT:   Head: Normocephalic and atraumatic  Eyes: Conjunctivae and EOM are normal  No scleral icterus  Neck: Normal range of motion  Neck supple  No JVD present  No tracheal deviation present  Cardiovascular: Normal rate and intact distal pulses  Exam reveals no gallop and no friction rub  Murmur heard  Irregular rhythm (PACs on ECG); grade 2 SCOTT, base  No diastolic murmur heard  Pulmonary/Chest: Effort normal and breath sounds normal  No stridor  No respiratory distress  She has no wheezes  She has no rales  She exhibits no tenderness  Abdominal: Soft  Bowel sounds are normal  She exhibits no distension  There is no tenderness  Musculoskeletal: Normal range of motion  She exhibits no edema or tenderness  Neurological: She is alert and oriented to person, place, and time  No cranial nerve deficit  Coordination normal    Skin: Skin is warm and dry  She is not diaphoretic  No erythema  Psychiatric: She has a normal mood and affect  Her behavior is normal  Judgment and thought content normal    Vitals reviewed        Nasima Gordon MD

## 2020-07-02 NOTE — RESULT ENCOUNTER NOTE
Hello    Patient normally is followed up by Ms Maxine Alvarado       Can you please let the patient know that the tacrolimus level is slightly above the goal   Patient is currently on 2 mg twice a day   -if the patient can reduce the tacrolimus to 2 mg in the morning and 1 mg in the evening  -please have the patient repeat a tacrolimus level before the next appointment with us    Thank you    np

## 2020-07-06 NOTE — PRE-PROCEDURE INSTRUCTIONS
Pre-Surgery Instructions:   Medication Instructions    amLODIPine (NORVASC) 10 mg tablet Instructed patient per Anesthesia Guidelines   ARIPiprazole (ABILIFY) 30 mg tablet Instructed patient per Anesthesia Guidelines   aspirin 81 MG tablet Patient was instructed by Physician and understands   busPIRone (BUSPAR) 10 mg tablet Instructed patient per Anesthesia Guidelines   Cholecalciferol (VITAMIN D3) 1000 units CAPS Instructed patient per Anesthesia Guidelines   CRANBERRY PO Instructed patient per Anesthesia Guidelines   doxazosin (CARDURA) 2 mg tablet Instructed patient per Anesthesia Guidelines   DULoxetine (CYMBALTA) 60 mg delayed release capsule Instructed patient per Anesthesia Guidelines   folic acid (FOLVITE) 1 mg tablet Instructed patient per Anesthesia Guidelines   hydrALAZINE (APRESOLINE) 100 MG tablet Instructed patient per Anesthesia Guidelines   insulin glargine (Toujeo Max SoloStar) 300 units/mL CONCETRATED U-300 injection pen (2-unit dial) Instructed patient per Anesthesia Guidelines   insulin lispro (HumaLOG KwikPen) 100 units/mL injection pen Instructed patient per Anesthesia Guidelines   lenalidomide (REVLIMID) 2 5 MG CAPS Instructed patient per Anesthesia Guidelines   levothyroxine 125 mcg tablet Instructed patient per Anesthesia Guidelines   LORazepam (ATIVAN) 2 mg tablet Instructed patient per Anesthesia Guidelines   metoprolol tartrate (LOPRESSOR) 50 mg tablet Instructed patient per Anesthesia Guidelines   pravastatin (PRAVACHOL) 80 mg tablet Instructed patient per Anesthesia Guidelines   predniSONE 5 mg tablet Instructed patient per Anesthesia Guidelines   rOPINIRole (REQUIP) 0 25 mg tablet Instructed patient per Anesthesia Guidelines   sertraline (ZOLOFT) 100 mg tablet Instructed patient per Anesthesia Guidelines   sevelamer (RENAGEL) 800 mg tablet Instructed patient per Anesthesia Guidelines      sodium bicarbonate 650 mg tablet Instructed patient per Anesthesia Guidelines   tacrolimus (PROGRAF) 1 mg capsule Instructed patient per Anesthesia Guidelines   torsemide (DEMADEX) 20 mg tablet Instructed patient per Anesthesia Guidelines  Spoke to pt  Medication list reviewed & instructed   As of 7/6 pt to stop vit D, cranberry, folic instructed on tylenol only  Pt instructed to continue aspirin per surgeon office  Per surgeon office:   Jane Jim  From: [place] Bones - To: IKE LAUREANO UNIT IKE VASQUEZ NURSE  Jun 19, 2020 12:17 PM  Am DOS pt ok to take morning meds EXCEPT demadex and insulin  Pt takes revlimid @   Showering instructions provided by surgeon office, reviewed @ time of call   Negative COVID screeningNena 7/8  All instructions verbally understood by patient  All questions answered

## 2020-07-06 NOTE — TELEPHONE ENCOUNTER
----- Message from Myranda Melo MD sent at 7/2/2020  7:38 PM EDT -----  Hello    Patient normally is followed up by Ms Shade Nevarez       Can you please let the patient know that the tacrolimus level is slightly above the goal   Patient is currently on 2 mg twice a day   -if the patient can reduce the tacrolimus to 2 mg in the morning and 1 mg in the evening  -please have the patient repeat a tacrolimus level before the next appointment with us    Thank you    np

## 2020-07-06 NOTE — TELEPHONE ENCOUNTER
Patient needs to be tested for COVID for surgery, asked to reschedule appointment  Rescheduled for 8/5 at 10:20

## 2020-07-07 NOTE — TELEPHONE ENCOUNTER
Left message for pt regarding the above, asked that she calls office to let us know that she got message and understand instructions, mailed lab slip

## 2020-07-12 NOTE — ANESTHESIA PREPROCEDURE EVALUATION
Review of Systems/Medical History  Patient summary reviewed  Chart reviewed  No history of anesthetic complications     Cardiovascular  Hyperlipidemia, Hypertension , Dysrhythmias , atrial fibrillation, CHF ,   Comment: Subclavian artery stenosis, left   12/26/19:  LEFT VENTRICLE:  Systolic function was normal  Ejection fraction was estimated to be 65 %  Although no diagnostic regional wall motion abnormality was identified, this possibility cannot be completely excluded on the basis of this study  Wall thickness was increased  Moderate AI (charted as severe previously),  Pulmonary       GI/Hepatic       Kidney transplant (and Pancreas),        Endo/Other  Diabetes Insulin, History of thyroid disease , hypothyroidism,      GYN       Hematology  Anemia ,     Musculoskeletal       Neurology   Psychology   Depression ,              Physical Exam    Airway    Mallampati score: II  TM Distance: >3 FB  Neck ROM: full     Dental       Cardiovascular      Pulmonary      Other Findings        Anesthesia Plan  ASA Score- 3     Anesthesia Type- general with ASA Monitors  Additional Monitors:   Airway Plan: LMA  Plan Factors-    Induction- intravenous  Postoperative Plan-     Informed Consent-   I personally reviewed this patient with the CRNA  Discussed and agreed on the Anesthesia Plan with the CRNA  Gerardo Lopez

## 2020-07-13 NOTE — ANESTHESIA POSTPROCEDURE EVALUATION
Post-Op Assessment Note    CV Status:  Stable    Pain management: adequate     Mental Status:  Awake and sleepy   Hydration Status:  Euvolemic   PONV Controlled:  Controlled   Airway Patency:  Patent   Post Op Vitals Reviewed: Yes      Staff: Anesthesiologist, CRNA           /54 (07/13/20 1132)    Temp (!) 97 3 °F (36 3 °C) (07/13/20 1132)    Pulse 69 (07/13/20 1132)   Resp 16 (07/13/20 1132)    SpO2 98 % (07/13/20 1132)

## 2020-07-13 NOTE — INTERVAL H&P NOTE
H&P reviewed  After examining the patient I find no changes in the patients condition since the H&P had been written      Vitals:    07/13/20 0659   BP: 140/56   Pulse: 56   Resp: 16   Temp: (!) 95 1 °F (35 1 °C)   SpO2: 100%     -to OR for creation of R brachiocephalic fistula vs brach ax AV graft

## 2020-07-13 NOTE — DISCHARGE INSTRUCTIONS
DISCHARGE INSTRUCTIONS  DIALYSIS FISTULA SURGERY    Following discharge from the hospital, you may have some questions about your operation, your activities or your general condition  These instructions may answer some of your questions and help you adjust during the first few weeks following your operation  ACTIVITY:  Limit use of the operated arm to what is absolutely necessary for the first day after surgery  On the second day after surgery, you may start to increase use of your arm as tolerated  One week after surgery, you should start to exercise your hand on the side of the graft by squeezing a ball  This increases blood flow in your graft and arm so your graft will function better  DIET:   Resume your normal diet  Try to eat low fat and low cholesterol foods  DRESSING:   The dressing may be removed on the third day following surgery  INCISION:   You may include the operated area in a shower on the fourth day following surgery  It is normal to have some pain at the surgical site  You will receive a prescription for pain medicine at the time of discharge  There will also be some swelling and bruising around the surgical site  If increasing redness or pain develops at the incision or severe pain, numbness or weakness occurs in the hand, call our office immediately  Numbness in the region of the incision may occur following the surgery  This normally resolves in six to twelve months  ARM SWELLING:    Most patients have some noticeable arm swelling after surgery  This usually disappears within a few weeks  If swelling is present, elevate the arm whenever possible  RESTRICTIONS:   Do not have blood draws, IVs, or blood pressures performed on the operated arm  FISTULA USE:    Your fistula will not be used for six to 12 weeks      PLEASE CALL THE OFFICE IF YOU HAVE ANY QUESTIONS  [phone] Reza Smith [phone]  TOLL FREE [phone]  [address] [place] [address], [place], [address]  [address], [address], [place], [address]  [address], IKE Breen [address]  [address] [facility], [place] [facility], [address]  [address], [address]  [facility], [address] [facility], [address], [facility], [facility] 97   [address] [facility], [address], [place], [address]  [address] [facility], [address] [facility], [facility],[address], [place] [address]

## 2020-07-13 NOTE — OP NOTE
OPERATIVE REPORT  PATIENT NAME: Valeria Back    :  1965  MRN: [id number]  Pt Location: BE OR ROOM 03    SURGERY DATE: 2020    Surgeon(s) and Role:     Micaela Dunn MD - Primary     * Dewayne Valentine MD - Assisting    Preop Diagnosis:  ESRD  Controlled type 1 diabetes mellitus with diabetic neuropathy ([address] ) [E10 40]  Status post kidney transplant [Z94 0]    Post-Op Diagnosis Codes:  ESRD     * Controlled type 1 diabetes mellitus with diabetic neuropathy ([address] ) [E10 40]     * Status post kidney transplant [Z94 0]    Procedure(s) (LRB):  Creation of right brachiocephalic fistula    Specimen(s):  none    Estimated Blood Loss:   15cc    Drains:  none    Anesthesia Type:   General ETA    Operative Indications:  Patient is a 64 yo F w/ DMI, s/p kidney/pancreas txp, now failed and on HD, needs permanent access    Operative Findings:  Right brachial artery is of excellent quality and caliber for AV fistula  Right cephalic vein is good quality but small caliber, in contrast to the preoperative vein mapping  Vessel is approx 2mm in diameter  Complications:   None    Procedure and Technique:  After informed consent was obtained, the patient was brought to the operating room and placed in the supine position  She was given anesthesia and endotracheally intubated  She was given IV antibiotics with ancef  Duplex ultrasound was used to examine the brachial artery and cephalic vein and the brachial artery was found to be of adequate size and quality  The cephalic vein was noted to be much smaller than seen on preoperative vein mapping  She was prepped and draped in the usual sterile fashion, exposing the right arm circumferentially  A timeout was performed  A transverse incision was made at the antecubital fossa  Of note, this incision was longer than normal due to the abnormally far distance between the cephalic vein and brachial artery  Cautery was used to dissect through the soft tissue    The cephalic vein was identified and freed proximally and distally  There was noted to be dense scarring at the antecubital fossa  The brachial artery was then identified and dissected free  Vessel loops were placed proximally and distally  3000 units of IV heparin were given  A bulldog clamp was placed on the cephalic vein centrally and it was ligated and transected peripherally  It was flushed and dilated with heparinized saline  The vessel loops were pulled taught on the brachial artery and an 11-blade was used to make a longitudinal incision  This was lengthened with Rincon scissors  The vein was cut to length and beveled  The anastomosis was created using 6-0 prolene suture, tied at the heel, and run circumferentially  Prior to completion, the vein and artery were bled and flushed  The vessel loops were then released, allowing flow through the fistula first and then distally down the arm  An excellent thrill could be felt in the fistula and a radial pulse was palpated  The wound was checked for hemostasis and copiously irrigated with antibiotic saline solution  It was then closed with 3-0 vicryl running suture for the subcutaneous tissue and 4-0 monocryl running suture in the subcuticular layer  The incision was dressed with histoacryl glue  The patient was allowed to awaken and was extubated  She was transferred to the PACU for postoperative care  I was present for the entire procedure    Patient Disposition:  PACU     SIGNATURE: Herbie Dunn MD  DATE: July 13, 2020  TIME: 11:44 AM    Vascular Quality Initiative - Hemodialysis Access Placement    Pre-admission Information   Functional status: Fully active; able to carry on all predisease activities without restriction  ESRD: ESRD: Hemodialysis dependent    Current Access Type : Tunneled Catheter    Historical Information      Previous Access: none    Access Type/Location: none        Procedure Information      Status: Outpatient     Side:right      Anesthesia: General     Access Type: Access Type: Surgical AVF Inflow Artery is: Brachial, Antecubital Intraoperative Artery taget diameter is: 4mm  Outflow Vein is: Cephalic, Upper Arm  Intraoperative Vein target diameter is: 2mm  Anastomosis configuration is: End to Side    Cocomitant Procedure performed-: None    Completion Fistulogram: no     Preop ARTERIAL evaluation and/or treatment: duplex    Preop VENOUS evaluation and/or treatment: ultrasound mapping    *Obtain Target Diameters from study          Post op Information     Discharge Status: Home    Post op Complications: None

## 2020-07-14 NOTE — TELEPHONE ENCOUNTER
----- Message from Stanford Zhou MD sent at 7/14/2020  2:58 PM EDT -----  Hello    Patient normally is followed up by Ms Abran Resendez  Tacrolimus level is at 4  No changes for now  Please let the patient know we will review further at the appointment in a couple weeks      Thank you    np

## 2020-07-16 NOTE — TELEPHONE ENCOUNTER
Vascular Nurse Navigator Post Op Phone Call    Post-Discharge phone call attempted to assess patient recovery after vascular surgery I left a message on answering machine  Will attempt to contact at later date  Pt's chart was reviewed prior to call and leaving message  Procedure: Creation of right brachiocephalic fistula    Date of Procedure: 7/13/20    Surgeon:    Alli Dunn MD - Primary     * Cliff Bai MD - Assisting    Anticoagulation pt was discharge on post op?: Aspirin    Dialysis Days and Location: [facility]S at [facility]    Reminded pt of the following in message:    Next Office Visit Appointment 7/27/20 with William Rojo PA-C at The [address] [place] [address]      To contact [facility] with any concerns

## 2020-07-17 NOTE — TELEPHONE ENCOUNTER
Patient returned call to office  She states she is doing well and has no pain  Patient's only question was the location of office where her appt will be  Info provided to patient, [facility]

## 2020-07-26 PROBLEM — Z98.890 S/P ARTERIOVENOUS (AV) FISTULA CREATION: Status: ACTIVE | Noted: 2020-01-01

## 2020-08-03 NOTE — ASSESSMENT & PLAN NOTE
End-stage renal disease on hemodialysis via right chest wall PermCath now s/p R brachio cephalic AV fistula creation by Dr Tamanna Hernandez Doctor 7/13/20  She presents for postop visit  -Brachial incision intact    -Excellent bruit and thrill  -She complains of intermittent muscle spasms in the right hand 1st and 2nd digits post operatively   -Easily palpable radial and ulnar pulse, good capillary refill, good  strength  -Exercise ball  -Evaluate with postoperative hemodialysis duplex to evaluate maturity and will also check digital with and without after to rule out steal syndrome  -Follow-up after duplex with Dr Tamanna Hernandez Doctor

## 2020-08-03 NOTE — TELEPHONE ENCOUNTER
Patient called requesting a refill on:  lenalidomide (REVLIMID) 2 5 MG CAPS   Patient has 2 pills left  Preferred pharmacy:  [address] [place] [id number]             Patient's call back # [phone]

## 2020-08-03 NOTE — TELEPHONE ENCOUNTER
LVM informing Miladys Jolly reminding her to get labs done for her appt on 8/5/2020 with Dr Jannet Xavier

## 2020-08-03 NOTE — PROGRESS NOTES
Assessment/Plan:    S/P arteriovenous (AV) fistula creation  End-stage renal disease on hemodialysis via right chest wall PermCath now s/p R brachio cephalic AV fistula creation by Dr Sonal Castanon Doctor 7/13/20  She presents for postop visit  -Brachial incision intact  -Excellent bruit and thrill  -She complains of intermittent muscle spasms in the right hand 1st and 2nd digits post operatively   -Easily palpable radial and ulnar pulse, good capillary refill, good  strength  -Exercise ball  -Evaluate with postoperative hemodialysis duplex to evaluate maturity and will also check digital with and without after to rule out steal syndrome  -Follow-up after duplex with Dr Sonal Castanon Doctor       Diagnoses and all orders for this visit:    ESRD (end stage renal disease) ([facility] [address] )  -     Cancel: VAS hemodialysis access duplex right upper limb avf; Future  -     VAS hemodialysis access duplex right upper limb avf; Future    S/P arteriovenous (AV) fistula creation  -     VAS hemodialysis access duplex right upper limb avf; Future          Subjective:      Patient ID: Yamilet Apodaca is a 63 y o  female  Patient is s/p creation of right brachiocephalic fistula 9/78/80  Patient has dialysis T/TH/Sat at [facility]  Incision without redness, drainage, warmth, fever or chills  Patient is taking ASA and Pravastatin  Former smoker  HPI   Patient presents to the office with her mother for postop visit  She denies any pain or discomfort  No incisional concerns  She does complain of muscle spasm in the right hand in particular the 1st and 2nd fingers that occurs with repetitive movements  Symptoms started after surgery  She denies any numbness, tingling, cool sensation of the hand  Currently on HD via right chest wall PermCath    Tuesday Thursday Saturday schedule  The following portions of the patient's history were reviewed and updated as appropriate: allergies, current medications, past family history, past medical history, past social history, past surgical history and problem list   ROS reviewed    Review of Systems   Constitutional: Positive for unexpected weight change (Lost 40 lbs over 6 months)  Negative for chills and fever  Eyes: Positive for visual disturbance (Difficulty at night)  Diabetic retinopathy   Respiratory: Negative  Cardiovascular: Positive for palpitations (Occasional)  Gastrointestinal: Positive for diarrhea (Occasional)  Endocrine: Negative  Genitourinary: Negative  Musculoskeletal: Positive for gait problem (Uses cane  )  Skin:        Incision to right arm without redness, drainage, warmth  Allergic/Immunologic: Negative  Neurological: Positive for dizziness, weakness (Generalized), light-headedness and numbness (Feet, legs and hands)  Hematological: Negative  Psychiatric/Behavioral: Negative  Objective:    I have reviewed and made appropriate changes to the review of systems input by the medical assistant      Vitals:    08/03/20 1441   BP: (!) 108/42   BP Location: Left arm   Patient Position: Sitting   Pulse: 56   Temp: 98 7 °F (37 1 °C)   TempSrc: Tympanic   Weight: 89 4 kg (197 lb)   Height: 5' 7"       Patient Active Problem List   Diagnosis    Renal transplant recipient    Chronic bacteriuria, likely colonization    Acquired hypothyroidism    Hypertension    Colitis    Diabetes mellitus type I, controlled ([facility] [address] )    Anemia    Status post kidney transplant    Immunosuppression (HCC)    Weakness    Chronic diastolic congestive heart failure (HCC)    Urinary incontinence    Calculus, bladder, diverticulum    Hypercalcemia    Chronic constipation    Atrial fibrillation (HCC)    Kaiser esophagus    Cataract    Cocaine abuse in remission ([facility] [address] )    Gastric and duodenal disease    Diabetic nephropathy (HCC)    Diabetic polyneuropathy (HCC)    Retinopathy, diabetic, bilateral ([facility])    Diastolic dysfunction    Essential and other specified forms of tremor  Failure of pancreas transplant    FELIPE (generalized anxiety disorder)    Hyperlipidemia    Irritable bowel syndrome    Major depressive disorder, recurrent episode, mild (HCC)    Aortic regurgitation    OAB (overactive bladder)    Osteoporosis    Peripheral vascular disease (HCC)    Post-traumatic stress disorder, chronic    Restless legs syndrome    Secondary hyperparathyroidism, renal ([facility] [address] )    Neuropathy in diabetes ([facility] )    History of herpes zoster    Subclavian artery stenosis, left (HCC)    Abnormal ECG    Multiple myeloma not having achieved remission ([facility])    Chronic kidney disease-mineral and bone disorder    Ambulatory dysfunction    Low bicarbonate    Urinary retention    Asymptomatic bacteriuria    Hyperphosphatemia    Acquired keratoderma    Disorder of bone and articular cartilage    [facility] stage [facility] disease) [facility]    S/P arteriovenous (AV) fistula creation       Past Surgical History:   Procedure Laterality Date    CATARACT EXTRACTION      CHOLECYSTECTOMY      COLONOSCOPY      two polyps in the rectum removed and biopsied diverticulosis in the sigmoid colon, external hemorrhoiods- Dr Barfield    COMBINED KIDNEY-PANCREAS TRANSPLANT N/A     CT BONE MARROW BIOPSY AND ASPIRATION  4/17/2019    CYSTOSCOPY N/A 10/13/2016    Procedure: CYSTOSCOPY, retrograde pyelogram, biopsy of ureteral polyp; Surgeon: Alexandra Bajwa MD;  Location: [facility];  Service:    Dina Orellana DILATION AND CURETTAGE OF UTERUS      ESOPHAGOGASTRODUODENOSCOPY N/A 11/20/2017    Procedure: ESOPHAGOGASTRODUODENOSCOPY (EGD); Surgeon: Komal De DO;  Location: [facility];   Service: Gastroenterology    EYE SURGERY      cataracts    FOOT AMPUTATION THROUGH METATARSAL Left     FOOT SURGERY Right     excision of metatarsal heads    HALLUX VALGUS CORRECTION Right     IR JEWELL QUIROZ HSPTL PLACEMENT  5/5/2020    NEPHRECTOMY TRANSPLANTED ORGAN      PANCREATIC TRANSPLANT REMOVAL  1998    [place] ANASTOMOSIS,AV,ANY SITE Right 2020    Procedure: Creation of right brachiocephalic fistula;   Surgeon: Mimi Dunn MD;  [facility]: [facility];  Service: Vascular       Family History   Problem Relation Age of Onset    Hypertension Mother     Cancer Mother     Hypertension Father     Cancer Father     Cancer Maternal Grandfather     Cancer Paternal Grandmother     Cancer Paternal Grandfather     Depression Sister     Breast cancer Maternal Grandmother 77       Social History     Socioeconomic History    Marital status: Legally      Spouse name: Not on file    Number of children: 0    Years of education: 2 years of college    Highest education level: Some college, no degree   Occupational History    Occupation: On disability   Social Needs    Financial resource strain: Somewhat hard    Food insecurity     Worry: Sometimes true     Inability: Sometimes true    Transportation needs     Medical: No     Non-medical: No   Tobacco Use    Smoking status: Former Smoker     Last attempt to quit: 2012     Years since quittin 2    Smokeless tobacco: Never Used   Substance and Sexual Activity    Alcohol use: Never     Frequency: Never     Binge frequency: Never    Drug use: Never     Types: Hydrocodone     Comment: Past cocaine use many years ago - no current use    Sexual activity: Not Currently   Lifestyle    Physical activity     Days per week: 4 days     Minutes per session: 30 min    Stress: Rather much   Relationships    Social connections     Talks on phone: More than three times a week     Gets together: More than three times a week     Attends Confucianism service: Never     Active member of club or organization: No     Attends meetings of clubs or organizations: Never     Relationship status:     Intimate partner violence     Fear of current or ex partner: No     Emotionally abused: No     Physically abused: No     Forced sexual activity: No   Other Topics Concern    Not on file   Social History Narrative    Education: some college    Learning Disabilities: none    Marital History:     Children: none    Living Arrangement: lives alone    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, on permanent disability    Functioning Relationships: limited support system    Legal History: none     History: None       Allergies   Allergen Reactions    Ixazomib Rash     Ninlaro    Revlimid [Lenalidomide] Rash    Cefadroxil      Tolerated cefepime 2019    Morphine Other (See Comments)     Other reaction(s): Other (See Comments)  projectile vomiting    Morphine And Related GI Intolerance    Myrbetriq [Mirabegron] Hives    Penicillins Hives     Other reaction(s):  Other (See Comments)  Respiratory Distress,hives  Tolerates cefazolin         Current Outpatient Medications:     amLODIPine (NORVASC) 10 mg tablet, Take 1 tablet (10 mg total) by mouth daily at bedtime, Disp: 30 tablet, Rfl: 0    ARIPiprazole (ABILIFY) 30 mg tablet, Take 1 tablet (30 mg total) by mouth daily at bedtime, Disp: 90 tablet, Rfl: 2    aspirin 81 MG tablet, Take 1 tablet by mouth daily, Disp: , Rfl:     busPIRone (BUSPAR) 10 mg tablet, Take 1 tablet (10 mg total) by mouth 2 (two) times a day, Disp: 180 tablet, Rfl: 2    Cholecalciferol (VITAMIN D3) 1000 units CAPS, Take 3 capsules by mouth daily  , Disp: , Rfl:     CRANBERRY PO, Take by mouth, Disp: , Rfl:     doxazosin (CARDURA) 2 mg tablet, Take 2 tablets (4 mg total) by mouth daily at bedtime, Disp: 180 tablet, Rfl: 3    DULoxetine (CYMBALTA) 60 mg delayed release capsule, Take 1 capsule (60 mg total) by mouth 2 (two) times a day, Disp: 180 capsule, Rfl: 2    folic acid (FOLVITE) 1 mg tablet, TAKE 1 TABLET BY MOUTH EVERY DAY AS DIRECTED, Disp: 90 tablet, Rfl: 3    hydrALAZINE (APRESOLINE) 100 MG tablet, Take 1 tablet (100 mg total) by mouth 3 (three) times a day, Disp: 270 tablet, Rfl: 0    insulin glargine (Toujeo Max SoloStar) 300 units/mL CONCETRATED U-300 injection pen (2-unit dial), Inject 6 Units under the skin daily at bedtime, Disp: , Rfl:     insulin lispro (HumaLOG KwikPen) 100 units/mL injection pen, Use up to 20 units daily via sliding scale, Disp: 5 pen, Rfl: 1    lenalidomide (REVLIMID) 2 5 MG CAPS, Take 2 5 mg every other day auth # [id number] 8/3/20, Disp: 14 capsule, Rfl: 0    levothyroxine 125 mcg tablet, TAKE 1 TABLET BY MOUTH EVERY DAY, Disp: 90 tablet, Rfl: 0    LORazepam (ATIVAN) 2 mg tablet, Take 1 tablet (2 mg total) by mouth 3 (three) times a day as needed for anxiety, Disp: 90 tablet, Rfl: 4    metoprolol tartrate (LOPRESSOR) 50 mg tablet, TAKE 1 TABLET TWICE DAILY, Disp: 180 tablet, Rfl: 0    pravastatin (PRAVACHOL) 80 mg tablet, TAKE 1 TABLET BY MOUTH DAILY, Disp: 90 tablet, Rfl: 5    predniSONE 5 mg tablet, TAKE 1 TABLET(5 MG TOTAL) BY MOUTH DAILY, Disp: 90 tablet, Rfl: 3    rOPINIRole (REQUIP) 0 25 mg tablet, TAKE 1 TABLET TWICE DAILY, Disp: 180 tablet, Rfl: 1    sertraline (ZOLOFT) 100 mg tablet, Take 2 tablets (200 mg total) by mouth daily, Disp: 180 tablet, Rfl: 2    sevelamer (RENAGEL) 800 mg tablet, Take 2 tablets (1,600 mg total) by mouth 3 (three) times a day with meals, Disp: 90 tablet, Rfl: 1    sodium bicarbonate 650 mg tablet, Take 3 tablets (1,950 mg total) by mouth 3 (three) times a day, Disp: 180 tablet, Rfl: 0    tacrolimus (PROGRAF) 1 mg capsule, TAKE 2 CAPSULES BY MOUTH EVERY TWELVE HOURS (Patient taking differently: Taking 2 mg in the am and 1 mg in the pm), Disp: 120 capsule, Rfl: 0    torsemide (DEMADEX) 20 mg tablet, TAKE 1 TABLET DAILY, Disp: 90 tablet, Rfl: 3    BP (!) 108/42 (BP Location: Left arm, Patient Position: Sitting)   Pulse 56   Temp 98 7 °F (37 1 °C) (Tympanic)   Ht 5' 7"   Wt 89 4 kg (197 lb)   LMP  (LMP Unknown)   BMI 30 85 kg/m²          Physical Exam    Patient A&O x3  No acute distress  Neurologically intact  + bruit + thrill at AVF  Right antecubital incision is intact    No dehiscence or drainage  Distal radial and ulnar pulses are easily palpable with good capillary refill

## 2020-08-04 NOTE — TELEPHONE ENCOUNTER
Medication refill has been canceled and transferred to [facility] and the patient is aware that all medication refills need to go through dialysis        Nancy Thakkar MA

## 2020-08-05 NOTE — PROGRESS NOTES
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 on 09/2017    Bone marrow biopsy on 04/2019 showed 35% plasma cells, with 13q deletion, trisomy 6, trisomy 17, and duplication of multiple chromosomes    Initiated on lenalidomide 5 mg p o   Every other day with good response initially with decrease in M protein and kappa light chain however the therapy was interrupted many times because of multiple admissions to the hospital fluid overload, urinary tract infection, symptomatic anemia, colitis    Initiated on hemodialysis in May 2020    Currently on lenalidomide 2 5 mg every other day since 03/06/2020    With partial response    We will increase lenalidomide 2 5 mg every day    Anemia multifactorial secondary to chronic kidney disease as well, hemodialysis, patient is on Epogen and Venofer    Follow-up in 1 month with CBC, CMP, SPEP, free light chain            HPI:  Ryan Cheema is a 50-year-old  female with history of type 1 diabetes mellitus, diabetic neuropathy, diabetic nephropathy, status post pancreas and kidney transplant in 1998 at [address] [place] [address], amputation of the right big toe, cholecystectomy, vitamin-D deficiency, exacerbation of diabetes mellitus while she is on insulin, possible pancreas burn out, herpes zoster x2, who was found to have abnormal serum protein electrophoresis in August 2017 with IgG kappa a peak of 0 4 grams/deciliter and peak 2  of 2 27 grams/deciliter, mildly elevated kappa light chain, chronic kidney disease     Status post bone marrow biopsy 9/20/2017 showed 15-20% plasma cells, normal cytogenetics and normal FISH panel for myeloma   Bone skeletal survey showed no evidence of lytic lesions   She was diagnosed with smoldering multiple myeloma  Renan Dacosta was meet with Dr Ila Zuleta from [facility] to continue to observe     She has diabetic neuropathy grade 3, uses a cane for ambulation, herpes zoster in February 2019      On 04/10/2019 kappa light chain 69, lambda light chain 17 2 with a ratio of 4 0 which increased from ratio of 2 8 in January 2019  Serum protein electrophoresis showed M protein of 0 5 and 2 67 IgG kappa, IgG 2 9 g, IgA 62, IgM 31, creatinine 2 0, total protein 9, albumin 3, calcium 8 6     Repeat bone marrow biopsy on 04/17/2019 showed progressive multiple myeloma with plasma cells in the range of 35%, now with multiple chromosomal abnormalities including 13 Q deletion, 11 Q, trisomy 11, gain of 17 PO trisomy 17, hyperdiploid with cane of additional copies of chromosome 5, 6, 7, 9, 11, 15, 17, 18, large deletion involving most of the chromosome 13 (monosomy 13) 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     Ixazomib with severe pruritus requiring discontinuation in June 2019      Lenalidomide 5 mg p o  Daily 3 weeks on 1 week off approved initiated on 08/07/2019 and discontinued on 08/13/2019 because of pruritus  At her 8/20/2019 f/u treatment options discussed   She elected to Re trial lenalidomide  5 milligrams every other day 3 weeks on 1 week off planned   She did not want IV medication        Bortezomib was not started because of grade 3 neuropathy      Admitted 9/9- 9/13/2019 secondary to UTI, EDUARDO/CKD stage IV, Acute metabolic encephalopathy   She was sent to the hospital due to elevated creatinine and dysuria   Revlimid held    Admitted 10/1/2019 2nd to urinary symptoms, dysuria increased frequency, chills and confusion      10/8/2019   Revlimid at 5 mg po every other day, 3 weeks on 1 week off retrialed   She did not want IV treatment      Admitted 11/5- 11/8/2019   Re-admitted 2nd to dysuria and EDUARDO   Moreno catheter placed   Catheter was discontinued 11/22/19 and patient was trained on self catheterization     Admitted 11/23/2019 - 12/06/19 for recurrent UTI   Findings of colitis seen on CT A/P       Admitted  12/9/2019 - 1/2/2020 2nd to fever and abdominal pain, diarrhea, lethargy, confusion    Inpatient rehab recommended   Patient declined   VRE bacteriuria treated with antibiotics  Erin Grimm was thought to be a chronic colonizer  , likely patient has chronic colonizer    She was treated for congestive heart failure    Admitted in May 2020 with diarrhea, abdominal pain, UTI    Initiated on hemodialysis in May 2020, lenalidomide 2 5 mg after hemodialysis    Improvement in clinical stamina after hemodialysis, nephrology are providing Epogen and Venofer           Interval History:        Previous Treatment:         Test Results:    Imaging: No results found  Labs:   Lab Results   Component Value Date    WBC 5 33 08/04/2020    HGB 8 7 (L) 08/04/2020    HCT 29 1 (L) 08/04/2020     (H) 08/04/2020     08/04/2020     Lab Results   Component Value Date     (L) 01/06/2016    K 5 4 (H) 08/04/2020     08/04/2020    CO2 18 (L) 08/04/2020    ANIONGAP 6 01/06/2016    BUN 92 (H) 08/04/2020    CREATININE 6 35 (H) 08/04/2020    GLUCOSE 103 09/13/2017    GLUF 79 08/04/2020    CALCIUM 8 5 08/04/2020    CORRECTEDCA 11 7 (H) 08/28/2018    AST 18 08/04/2020    ALT 24 08/04/2020    ALKPHOS 127 (H) 08/04/2020    PROT 8 3 (H) 12/14/2015    BILITOT 0 27 12/14/2015    EGFR 7 08/04/2020       Lab Results   Component Value Date    IRON 65 01/17/2020    TIBC 215 (L) 01/17/2020    FERRITIN 143 01/17/2020       Lab Results   Component Value Date    VFEYCKTX45 438 07/14/2014         ROS: Review of Systems   Constitutional: Negative for appetite change, chills, diaphoresis, fatigue and unexpected weight change  HENT:   Negative for mouth sores, nosebleeds, sore throat, trouble swallowing and voice change  Eyes: Negative for eye problems and icterus     Respiratory: Negative for chest tightness, cough, hemoptysis and wheezing  Cardiovascular: Negative for chest pain, leg swelling and palpitations  Gastrointestinal: Negative for abdominal distention, abdominal pain, blood in stool, constipation, diarrhea, nausea and vomiting  Endocrine: Negative for hot flashes  Genitourinary: Negative for bladder incontinence, difficulty urinating, dyspareunia, dysuria and frequency  Musculoskeletal: Positive for gait problem  Negative for arthralgias, back pain, neck pain and neck stiffness  Skin: Negative for itching and rash  Neurological: Positive for gait problem and numbness  Negative for dizziness, headaches, seizures and speech difficulty  Hematological: Negative for adenopathy  Does not bruise/bleed easily  Psychiatric/Behavioral: Negative for decreased concentration, depression, sleep disturbance and suicidal ideas  The patient is not nervous/anxious  Current Medications: Reviewed  Allergies: Reviewed  PMH/FH/SH:  Reviewed      Physical Exam:    Body surface area is 2 03 meters squared  Wt Readings from Last 3 Encounters:   08/05/20 91 6 kg (202 lb)   08/03/20 89 4 kg (197 lb)   07/13/20 88 9 kg (196 lb)        Temp Readings from Last 3 Encounters:   08/05/20 (!) 96 9 °F (36 1 °C) (Tympanic)   08/03/20 98 7 °F (37 1 °C) (Tympanic)   07/13/20 (!) 96 1 °F (35 6 °C) (Tympanic)        BP Readings from Last 3 Encounters:   08/05/20 (!) 122/40   08/03/20 (!) 108/42   07/13/20 (!) 114/48         Pulse Readings from Last 3 Encounters:   08/05/20 81   08/03/20 56   07/13/20 62        Physical Exam   Constitutional: She is oriented to person, place, and time  She appears well-developed  No distress  HENT:   Head: Normocephalic and atraumatic  Eyes: Conjunctivae are normal    Neck: Normal range of motion  Neck supple  No tracheal deviation present  Cardiovascular: Normal rate and regular rhythm  Exam reveals no gallop and no friction rub  No murmur heard    Pulmonary/Chest: Effort normal and breath sounds normal  No respiratory distress  She has no wheezes  She has no rales  She exhibits no tenderness  Abdominal: Soft  She exhibits no distension  There is no abdominal tenderness  Musculoskeletal:         General: No swelling  Right lower leg: No edema  Left lower leg: No edema  Lymphadenopathy:     She has no cervical adenopathy  Neurological: She is alert and oriented to person, place, and time  She displays weakness  Skin: Skin is warm and dry  She is not diaphoretic  No erythema  No pallor  Psychiatric: Her behavior is normal  Judgment and thought content normal    Vitals reviewed  ECO    Goals and Barriers:  Current Goal: Minimize effects of disease  Barriers: None  Patient's Capacity to Self Care:  Patient is able to self care      Code Status: [unfilled]

## 2020-08-05 NOTE — TELEPHONE ENCOUNTER
----- Message from Jeff Mock MD sent at 8/4/2020 12:58 PM EDT -----  Hello    Patient normally is followed up by Ms Scooter Bunch       Can you please let pt dialysis unit know that phos and mag are high as they may make adjustments    Thank you    np

## 2020-08-06 NOTE — TELEPHONE ENCOUNTER
[employer] called for clarification on revlimid:    E-Prescribing Status     Outpatient Medication Detail     lenalidomide (REVLIMID) 2 5 MG CAPS         Sig: Take 2 5 mg every day auth # [id number] 8/3/20         Sent to pharmacy as: lenalidomide (REVLIMID) 2 5 MG CAPS         Class: Normal         E-Prescribing Status: Receipt confirmed by pharmacy (8/5/2020 10:49 AM EDT)

## 2020-08-10 NOTE — PROGRESS NOTES
Virtual Regular Visit      Assessment/Plan:    Problem List Items Addressed This Visit        Other    Renal transplant recipient - Primary    Relevant Orders    Basic metabolic panel    Tacrolimus level               Reason for visit is   Chief Complaint   Patient presents with    Virtual Regular Visit        Encounter provider Lauren Morris MD    Provider located at [address] [place] [id number]      Recent Visits  No visits were found meeting these conditions  Showing recent visits within past 7 days and meeting all other requirements     Today's Visits  Date Type Provider Dept   08/10/20 Telemedicine Lauren Morris MD [address] today's visits and meeting all other requirements     Future Appointments  No visits were found meeting these conditions  Showing future appointments within next 150 days and meeting all other requirements        The patient was identified by name and date of birth  Alina Conroy was informed that this is a telemedicine visit and that the visit is being conducted through phone and patient was informed that this is not a secure, HIPAA-complaint platform  She agrees to proceed     My office door was closed  No one else was in the room  She acknowledged consent and understanding of privacy and security of the video platform  The patient has agreed to participate and understands they can discontinue the visit at any time  Patient is aware this is a billable service  It was my intent to perform this visit via video technology but the patient was not able to do a video connection so the visit was completed via audio telephone only  Pt preference    Subjective  Kasie Reyes Agustina Dominguez is a 63 y o  female who states is feeling better since starting dialysis  Denies complaints currently   States is fatigued after dialysis but otherwise feels better than prior  David Deluca       HPI     Past Medical History:   Diagnosis Date    Abnormal liver function test     [facility]     Acute on chronic congestive heart failure (HCC)     Allergic urticaria     Anemia     Cancer (HCC)     Multiple myeloma    Cervical dysplasia     Cholelithiasis     Chronic diastolic (congestive) heart failure ([facility] ) 9/18/2017    [facility]     Previous, controlled with diet    Diabetes mellitus with foot ulcer ([facility] )     Disease of thyroid gland     Encephalopathy     Hematuria     + leak est- secondary to UTIs/panc drainage    History of transfusion     Hyperkalemia     Hypertension     Iliotibial band syndrome     Lumbar radiculopathy     Multiple myeloma (HCC)     Multiple myeloma ([facility]     Night blindness     Nonrheumatic aortic (valve) insufficiency     Pneumonia     Renal disorder     Retinopathy     Seborrhea     [facility]     Shingles     Sinus tachycardia     B blocker - cardio echo stress test 02 normal/neg LE doppler 2/02 OK and 12/07    Status post simultaneous kidney and pancreas transplant ([facility] )     Toe amputation status     Trochanteric bursitis        Past Surgical History:   Procedure Laterality Date    CATARACT EXTRACTION      CHOLECYSTECTOMY      COLONOSCOPY      two polyps in the rectum removed and biopsied diverticulosis in the sigmoid colon, external hemorrhoiods- Dr Barfield    COMBINED KIDNEY-PANCREAS TRANSPLANT N/A     CT BONE MARROW BIOPSY AND ASPIRATION  4/17/2019    CYSTOSCOPY N/A 10/13/2016    Procedure: CYSTOSCOPY, retrograde pyelogram, biopsy of ureteral polyp; Surgeon: Jade Du MD;  Location[facility];  Service:    [address] [place] [address] DILATION AND CURETTAGE OF UTERUS      ESOPHAGOGASTRODUODENOSCOPY N/A 11/20/2017    Procedure: ESOPHAGOGASTRODUODENOSCOPY (EGD); Surgeon: Krissy Etienne DO;  Location: [facility];   Service: Gastroenterology    EYE SURGERY      cataracts    FOOT AMPUTATION THROUGH METATARSAL Left     FOOT SURGERY Right excision of metatarsal heads    HALLUX VALGUS CORRECTION Right     IR JEWELL QUIROZ HSPTL PLACEMENT  5/5/2020    NEPHRECTOMY TRANSPLANTED ORGAN      PANCREATIC TRANSPLANT REMOVAL  1998    MS ANASTOMOSIS,AV,ANY SITE Right 7/13/2020    Procedure: Creation of right brachiocephalic fistula;   Surgeon: Mary Dunn MD;  [facility];  Service: Vascular       Current Outpatient Medications   Medication Sig Dispense Refill    amLODIPine (NORVASC) 10 mg tablet Take 1 tablet (10 mg total) by mouth daily at bedtime 30 tablet 0    ARIPiprazole (ABILIFY) 30 mg tablet Take 1 tablet (30 mg total) by mouth daily at bedtime 90 tablet 2    aspirin 81 MG tablet Take 1 tablet by mouth daily      busPIRone (BUSPAR) 10 mg tablet Take 1 tablet (10 mg total) by mouth 2 (two) times a day 180 tablet 2    Cholecalciferol (VITAMIN D3) 1000 units CAPS Take 3 capsules by mouth daily        CRANBERRY PO Take by mouth      doxazosin (CARDURA) 2 mg tablet Take 2 tablets (4 mg total) by mouth daily at bedtime 180 tablet 3    DULoxetine (CYMBALTA) 60 mg delayed release capsule Take 1 capsule (60 mg total) by mouth 2 (two) times a day 180 capsule 2    folic acid (FOLVITE) 1 mg tablet TAKE 1 TABLET BY MOUTH EVERY DAY AS DIRECTED 90 tablet 3    hydrALAZINE (APRESOLINE) 100 MG tablet Take 1 tablet (100 mg total) by mouth 3 (three) times a day 270 tablet 0    insulin glargine (Toujeo Max SoloStar) 300 units/mL CONCETRATED U-300 injection pen (2-unit dial) Inject 6 Units under the skin daily at bedtime      insulin lispro (HumaLOG KwikPen) 100 units/mL injection pen Use up to 20 units daily via sliding scale 5 pen 1    lenalidomide (REVLIMID) 2 5 MG CAPS Take 2 5 mg every day auth # [id number] 8/3/20 28 capsule 0    levothyroxine 125 mcg tablet TAKE 1 TABLET BY MOUTH EVERY DAY 90 tablet 0    LORazepam (ATIVAN) 2 mg tablet Take 1 tablet (2 mg total) by mouth 3 (three) times a day as needed for anxiety 90 tablet 4    metoprolol tartrate (LOPRESSOR) 50 mg tablet TAKE 1 TABLET TWICE DAILY 180 tablet 0    pravastatin (PRAVACHOL) 80 mg tablet TAKE 1 TABLET BY MOUTH DAILY 90 tablet 5    predniSONE 5 mg tablet TAKE 1 TABLET(5 MG TOTAL) BY MOUTH DAILY 90 tablet 3    rOPINIRole (REQUIP) 0 25 mg tablet TAKE 1 TABLET TWICE DAILY 180 tablet 1    sertraline (ZOLOFT) 100 mg tablet Take 2 tablets (200 mg total) by mouth daily 180 tablet 2    sevelamer (RENAGEL) 800 mg tablet Take 2 tablets (1,600 mg total) by mouth 3 (three) times a day with meals 90 tablet 1    sodium bicarbonate 650 mg tablet Take 3 tablets (1,950 mg total) by mouth 3 (three) times a day 180 tablet 0    tacrolimus (PROGRAF) 1 mg capsule TAKE 2 CAPSULES BY MOUTH EVERY TWELVE HOURS (Patient taking differently: Taking 2 mg in the am and 1 mg in the pm) 120 capsule 0    torsemide (DEMADEX) 20 mg tablet TAKE 1 TABLET DAILY 90 tablet 3     No current facility-administered medications for this visit  Allergies   Allergen Reactions    Ixazomib Rash     Ninlaro    Revlimid [Lenalidomide] Rash    Cefadroxil      Tolerated cefepime 2019    Morphine Other (See Comments)     Other reaction(s): Other (See Comments)  projectile vomiting    Morphine And Related GI Intolerance    Myrbetriq [Mirabegron] Hives    Penicillins Hives     Other reaction(s): Other (See Comments)  Respiratory Distress,hives  Tolerates cefazolin       Review of Systems    Video Exam    There were no vitals filed for this visit      Physical Exam     61 yo female with PMHx of renal pancreas transplant 1998, DM I, failed pancreas transplant 2017, HTN, recurrent UTI/pyelo, recurrent, 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, Did require dialysis in 2014, orthostasis, anemia, hypothyroid, 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, dCHF, aortic regurg, dCHF grade I, zoster in July, subclavian art stenosis on Left presents for follow-up visit for renal transplant     Patient has had multiple admissions and issues recently     9/2017 -acute kidney injury, urinary frequency   Treated initially for UTI      10/2017 -urinary incontinence   Was started on antibiotics for possible UTI   Was started on Bactrim prophylaxis      11/2017 -abdominal pain  Nausea   Confusion   Depression and anxiety   Patient had EGD which showed gastritis   Given volume expansion        2/2018-depression and anxiety   Treated for UTI also        07/2018-treated for urinary tract infection; also was seen in the ER on 07/23 for periorbital cellulitis     9/2018 - renal artery study at [facility] - patent renal artery and anastamosis that did not require intervention     March/april 2019 - saw Hematology  Rising kappa light chain  BM biopsy was recommended       4/16/19 - had foot abscess and treated with antibiotics       4/17/19 - BM biopsy completed  Progressing myeloma cells in BM     8/2019 - patient admitted to the hospital for cystitis, diffuse rash   Felt to be drug rash due to new chemotherapy agent   Cystitis with E coli pansensitive and started on antibiotics     10/2019 - urosepsis with acute kidney injury admission   AT in the setting of prerenal/sepsis     11/2019-presented with dysuria   Concern for pyelonephritis   Patient also had urinary retention but was not able to self-catheterize   Moreno catheter was required at this time     Moreno discontinued on 11/22/19 and patient was supposed to self catheterize     12/2019- presented with urinary tract complaints   Treated for urosepsis with VRE   Required transfusion      12/2019 - represented on 12/9 - patient colitis?    CMV negative   C diff negative patient had bacteremia with porphyromonas which was monitored off antibiotics, asymptomatic bacteruria monitored      01/2020-admitted with anemia 6 5   Had warm autoantibodies but tolerated packed red blood cell   Acute kidney injury with creatinine up to 4   Possible prerenal versus progression of kidney dysfunction   Treated for urosepsis also     4/2020 - saw Vascular Surgery for fistula placement evaluation  Pt wanted to inquire about PD rather than HD  I spoke with pt  PD is not a good option for patient for social reasons  Pt in agreement       4/21/2020 - received pRBC due to anemia    5/2020 - initiated on hemodialysis    7/2020 - AVF placement     1) Renal transplant - now with allograft failure and on dialysis since may 2020     - has renal artery stenosis of transplanted kidney > 60%-s/p renal artery study without stenosis at [facility] with IR    Transplant candidacy - not a candidate for now due to MM per my discussion with Hematology given that pt has not been amenable to adequate treatment with IV regimen  Needs less than 5% BM activity before could be considered       Plan:  - pt will continue dialysis as scheduled  - have messaged Hematology team to gain perspective of myeloma progression  - based on discussion will plan to cont tacrolimus 2/1 for now (AM/PM) and goal level 2-4 is ok  - cont pred 5 for now but start taper by one mg every month or two months depending on discussion with Hematologist  - on bicarb tabs - have messaged Primary Nephrologist and spoke with HD Nurse today --> can hold bicarb tabs  - elevated mag - per Dialysis team  Have discussed with HD Nursing team   - labwork in one month  - appointment in 2 months       2) Immunosuppression - given Hematological issues, goal tac 2-4     - tac 2 mg in a m , 1 mg in p m   - Continue prednisone 5 mg for now but will likely start taper soon  See above     3) HTN - pt states has improved after starting dialysis     -renal artery study with stenosis present - saw [facility] team and had IR procedure on 9/25/18 with CO2 study and 5 cc contrast utilized - the transplant artery is slightly tortuous but the artery was widely patent     4) Anemia - has ESRD, and MM   Being managed by Hematology and Dialysis team     - prior fec occ positive  - had EGD prior with gastritis  - has MM also  - had transfusion mult times recently  Most recent early april     5) recurrent UTI/pyelo - asymptomatic currently     - monitor clinically  - pt's E coli has become resistant over time     6) Vaccine - should stay up to date on innactivated vaccinations     -hold on taking shingrix vaccine for one year post shingles infection     7) Lipids - as per PCP with regards to lipid check     8) BMD - DEXA       - hyperphosphatemia improving with phosphorus binders  -DEXA scan per Endo team     9) proteinuria -likely multifactorial due to advanced age of kidney/allograft nephropathy, paraproteinemia       8) age appropriate ca screening - needs to remain up to date with mammogram an colonoscopy      - needs to f/u with Derm Yearly - pt will call to make appt     11) depression - Patient states depression is well-controlled with the current regimen      - per Psychiatry team     12) question of renal art stenosis - not evident on study     13) MGUS/IgG Kappa now with myeloma - follows with Hematology/Oncology      -patient may require chemotherapy in the near future  - started on treatment with ixazomib but had a reaction  -patient had reaction to 2nd agent to also with bortezomib  - started on then on lenalidomide which was increased on 8/5     14) Fatigue - likely anemia, MM, and uremia related     15) hyponatremia - improved       16) pancreas transplant -      - failed pancreas allograft - following with Endocrine     17) Volume - per dialysis team    18) zoster     -zoster with mult episodes  - now resolved     19) aortic regurg - on ECHO July with grade I dCHF     20) subclavian art stenosis on L     21) acid/base - hold bicarb tabs     22) hypermag - monitoring per dialysis team  Messaged Nephrologist    I spent 20 minutes directly with the patient during this visit      VIRTUAL VISIT DISCLAIMER    Simi William acknowledges that she has consented to an online visit or consultation  She understands that the online visit is based solely on information provided by her, and that, in the absence of a face-to-face physical evaluation by the physician, the diagnosis she receives is both limited and provisional in terms of accuracy and completeness  This is not intended to replace a full medical face-to-face evaluation by the physician  Shell Varghese understands and accepts these terms

## 2020-08-10 NOTE — LETTER
August 10, 2020     Theresa Religion, [address]    Patient: Gregory Lobo   YOB: 1965   Date of Visit: 8/10/2020       Dear Dr Carmen Ruano:    Thank you for referring Gregory Lobo to me for evaluation  Below are my notes for this consultation  If you have questions, please do not hesitate to call me  I look forward to following your patient along with you  Sincerely,        Shawna Calles MD        CC: MD Shawna De Anda MD  8/10/2020 10:43 AM  Sign when Signing Visit    Virtual Regular Visit      Assessment/Plan:    Problem List Items Addressed This Visit        Other    Renal transplant recipient - Primary    Relevant Orders    Basic metabolic panel    Tacrolimus level               Reason for visit is   Chief Complaint   Patient presents with    Virtual Regular Visit        Encounter provider Shawna Calles MD    Provider located at [address] [place] [address]      Recent Visits  No visits were found meeting these conditions  Showing recent visits within past 7 days and meeting all other requirements     Today's Visits  Date Type Provider Dept   08/10/20 Telemedicine Shawna Calles MD [address] [place] [address] today's visits and meeting all other requirements     Future Appointments  No visits were found meeting these conditions  Showing future appointments within next 150 days and meeting all other requirements        The patient was identified by name and date of birth  Gregory Lobo was informed that this is a telemedicine visit and that the visit is being conducted through phone and patient was informed that this is not a secure, HIPAA-complaint platform  She agrees to proceed     My office door was closed  No one else was in the room  She acknowledged consent and understanding of privacy and security of the video platform   The patient has agreed to participate and understands they can discontinue the visit at any time  Patient is aware this is a billable service  It was my intent to perform this visit via video technology but the patient was not able to do a video connection so the visit was completed via audio telephone only  Pt preference    Subjective  Reid Thomas Adjutant is a 63 y o  female who states is feeling better since starting dialysis  Denies complaints currently   States is fatigued after dialysis but otherwise feels better than prior  Brandon MILES     Past Medical History:   Diagnosis Date    Abnormal liver function test     Acute kidney injury ([address] )     Acute on chronic congestive heart failure (HCC)     Allergic urticaria     Anemia     Cancer (HCC)     Multiple myeloma    Cervical dysplasia     Cholelithiasis     Chronic diastolic (congestive) heart failure ([address] ) 9/18/2017    Diabetes mellitus ([address] )     Previous, controlled with diet    Diabetes mellitus with foot ulcer ([address] )     Disease of thyroid gland     Encephalopathy     Hematuria     + leak est- secondary to UTIs/panc drainage    History of transfusion     Hyperkalemia     Hypertension     Iliotibial band syndrome     Lumbar radiculopathy     Multiple myeloma (HCC)     Multiple myeloma (HCC)     Night blindness     Nonrheumatic aortic (valve) insufficiency     Pneumonia     Renal disorder     Retinopathy     Seborrhea     Seizure ([address] )     Shingles     Sinus tachycardia     B blocker - cardio echo stress test 02 normal/neg LE doppler 2/02 OK and 12/07    Status post simultaneous kidney and pancreas transplant ([address] )     Toe amputation status     Trochanteric bursitis        Past Surgical History:   Procedure Laterality Date    CATARACT EXTRACTION      CHOLECYSTECTOMY      COLONOSCOPY      two polyps in the rectum removed and biopsied diverticulosis in the sigmoid colon, external hemorrhoiods- Dr Barfield    COMBINED KIDNEY-PANCREAS TRANSPLANT N/A     CT BONE MARROW BIOPSY AND ASPIRATION  4/17/2019    CYSTOSCOPY N/A 10/13/2016    Procedure: CYSTOSCOPY, retrograde pyelogram, biopsy of ureteral polyp; Surgeon: Modesta Gordillo MD;  Location: [facility];  Service:    Emmy Vo DILATION AND CURETTAGE OF UTERUS      ESOPHAGOGASTRODUODENOSCOPY N/A 11/20/2017    Procedure: ESOPHAGOGASTRODUODENOSCOPY (EGD); Surgeon: Abdulkadir Burnham DO;  Location: [facility]; Service: Gastroenterology    EYE SURGERY      cataracts    FOOT AMPUTATION THROUGH METATARSAL Left     FOOT SURGERY Right     excision of metatarsal heads    HALLUX VALGUS CORRECTION Right     IR [address] [place] PLACEMENT  5/5/2020    NEPHRECTOMY TRANSPLANTED ORGAN      PANCREATIC TRANSPLANT REMOVAL  1998    CO ANASTOMOSIS,AV,ANY SITE Right 7/13/2020    Procedure: Creation of right brachiocephalic fistula;   Surgeon: Anson Dunn MD;  Location: [facility];  Service: Vascular       Current Outpatient Medications   Medication Sig Dispense Refill    amLODIPine (NORVASC) 10 mg tablet Take 1 tablet (10 mg total) by mouth daily at bedtime 30 tablet 0    ARIPiprazole (ABILIFY) 30 mg tablet Take 1 tablet (30 mg total) by mouth daily at bedtime 90 tablet 2    aspirin 81 MG tablet Take 1 tablet by mouth daily      busPIRone (BUSPAR) 10 mg tablet Take 1 tablet (10 mg total) by mouth 2 (two) times a day 180 tablet 2    Cholecalciferol (VITAMIN D3) 1000 units CAPS Take 3 capsules by mouth daily        CRANBERRY PO Take by mouth      doxazosin (CARDURA) 2 mg tablet Take 2 tablets (4 mg total) by mouth daily at bedtime 180 tablet 3    DULoxetine (CYMBALTA) 60 mg delayed release capsule Take 1 capsule (60 mg total) by mouth 2 (two) times a day 180 capsule 2    folic acid (FOLVITE) 1 mg tablet TAKE 1 TABLET BY MOUTH EVERY DAY AS DIRECTED 90 tablet 3    hydrALAZINE (APRESOLINE) 100 MG tablet Take 1 tablet (100 mg total) by mouth 3 (three) times a day 270 tablet 0    insulin glargine (Toujeo Max SoloStar) 300 units/mL CONCETRATED U-300 injection pen (2-unit dial) Inject 6 Units under the skin daily at bedtime      insulin lispro (HumaLOG KwikPen) 100 units/mL injection pen Use up to 20 units daily via sliding scale 5 pen 1    lenalidomide (REVLIMID) 2 5 MG CAPS Take 2 5 mg every day auth # [id number] 8/3/20 28 capsule 0    levothyroxine 125 mcg tablet TAKE 1 TABLET BY MOUTH EVERY DAY 90 tablet 0    LORazepam (ATIVAN) 2 mg tablet Take 1 tablet (2 mg total) by mouth 3 (three) times a day as needed for anxiety 90 tablet 4    metoprolol tartrate (LOPRESSOR) 50 mg tablet TAKE 1 TABLET TWICE DAILY 180 tablet 0    pravastatin (PRAVACHOL) 80 mg tablet TAKE 1 TABLET BY MOUTH DAILY 90 tablet 5    predniSONE 5 mg tablet TAKE 1 TABLET(5 MG TOTAL) BY MOUTH DAILY 90 tablet 3    rOPINIRole (REQUIP) 0 25 mg tablet TAKE 1 TABLET TWICE DAILY 180 tablet 1    sertraline (ZOLOFT) 100 mg tablet Take 2 tablets (200 mg total) by mouth daily 180 tablet 2    sevelamer (RENAGEL) 800 mg tablet Take 2 tablets (1,600 mg total) by mouth 3 (three) times a day with meals 90 tablet 1    sodium bicarbonate 650 mg tablet Take 3 tablets (1,950 mg total) by mouth 3 (three) times a day 180 tablet 0    tacrolimus (PROGRAF) 1 mg capsule TAKE 2 CAPSULES BY MOUTH EVERY TWELVE HOURS (Patient taking differently: Taking 2 mg in the am and 1 mg in the pm) 120 capsule 0    torsemide (DEMADEX) 20 mg tablet TAKE 1 TABLET DAILY 90 tablet 3     No current facility-administered medications for this visit  Allergies   Allergen Reactions    Ixazomib Rash     Ninlaro    Revlimid [Lenalidomide] Rash    Cefadroxil      Tolerated cefepime 2019    Morphine Other (See Comments)     Other reaction(s): Other (See Comments)  projectile vomiting    Morphine And Related GI Intolerance    Myrbetriq [Mirabegron] Hives    Penicillins Hives     Other reaction(s):  Other (See Comments)  Respiratory Distress,hives  Tolerates cefazolin       Review of Systems    Video Exam    There were no vitals filed for this visit  Physical Exam     63 yo female with PMHx of renal pancreas transplant 1998, DM I, failed pancreas transplant 2017, HTN, recurrent UTI/pyelo, recurrent, 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, Did require dialysis in 2014, orthostasis, anemia, hypothyroid, 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, dCHF, aortic regurg, dCHF grade I, zoster in July, subclavian art stenosis on Left presents for follow-up visit for renal transplant     Patient has had multiple admissions and issues recently     9/2017 -acute kidney injury, urinary frequency   Treated initially for UTI      10/2017 -urinary incontinence   Was started on antibiotics for possible UTI   Was started on Bactrim prophylaxis      11/2017 -abdominal pain  Nausea   Confusion   Depression and anxiety   Patient had EGD which showed gastritis   Given volume expansion        2/2018-depression and anxiety   Treated for UTI also        07/2018-treated for urinary tract infection; also was seen in the ER on 07/23 for periorbital cellulitis     9/2018 - renal artery study at [facility] - patent renal artery and anastamosis that did not require intervention     March/april 2019 - saw Hematology  Rising kappa light chain  BM biopsy was recommended       4/16/19 - had foot abscess and treated with antibiotics       4/17/19 - BM biopsy completed  Progressing myeloma cells in BM     8/2019 - patient admitted to the hospital for cystitis, diffuse rash   Felt to be drug rash due to new chemotherapy agent   Cystitis with E coli pansensitive and started on antibiotics     10/2019 - urosepsis with acute kidney injury admission    AT in the setting of prerenal/sepsis     11/2019-presented with dysuria   Concern for pyelonephritis   Patient also had urinary retention but was not able to self-catheterize   Moreno catheter was required at this time     Moreno discontinued on 11/22/19 and patient was supposed to self catheterize     12/2019- presented with urinary tract complaints   Treated for urosepsis with VRE   Required transfusion      12/2019 - represented on 12/9 - patient colitis?    CMV negative   C diff negative patient had bacteremia with porphyromonas which was monitored off antibiotics, asymptomatic bacteruria monitored      01/2020-admitted with anemia 6 5   Had warm autoantibodies but tolerated packed red blood cell   Acute kidney injury with creatinine up to 4  Possible prerenal versus progression of kidney dysfunction   Treated for urosepsis also     4/2020 - saw Vascular Surgery for fistula placement evaluation  Pt wanted to inquire about PD rather than HD  I spoke with pt  PD is not a good option for patient for social reasons  Pt in agreement       4/21/2020 - received pRBC due to anemia    5/2020 - initiated on hemodialysis    7/2020 - AVF placement     1) Renal transplant - now with allograft failure and on dialysis since may 2020     - has renal artery stenosis of transplanted kidney > 60%-s/p renal artery study without stenosis at [place] with IR    Transplant candidacy - not a candidate for now due to MM per my discussion with Hematology given that pt has not been amenable to adequate treatment with IV regimen   Needs less than 5% BM activity before could be considered       Plan:  - pt will continue dialysis as scheduled  - have messaged Hematology team to gain perspective of myeloma progression  - based on discussion will plan to cont tacrolimus 2/1 for now (AM/PM) and goal level 2-4 is ok  - cont pred 5 for now but start taper by one mg every month or two months depending on discussion with Hematologist  - on bicarb tabs - have messaged Primary Nephrologist and spoke with HD Nurse today --> can hold bicarb tabs  - elevated mag - per Dialysis team  Have discussed with HD Nursing team   - labwork in one month  - appointment in 2 months       2) Immunosuppression - given Hematological issues, goal tac 2-4     - tac 2 mg in a m , 1 mg in p m   - Continue prednisone 5 mg for now but will likely start taper soon  See above     3) HTN - pt states has improved after starting dialysis     -renal artery study with stenosis present - saw [facility] team and had IR procedure on 9/25/18 with CO2 study and 5 cc contrast utilized - the transplant artery is slightly tortuous but the artery was widely patent     4) Anemia - has ESRD, and MM  Being managed by Hematology and Dialysis team     - prior fec occ positive  - had EGD prior with gastritis  - has MM also  - had transfusion mult times recently  Most recent early april     5) recurrent UTI/pyelo - asymptomatic currently     - monitor clinically  - pt's E coli has become resistant over time     6) Vaccine - should stay up to date on innactivated vaccinations     -hold on taking shingrix vaccine for one year post shingles infection     7) Lipids - as per PCP with regards to lipid check     8) BMD - DEXA       - hyperphosphatemia improving with phosphorus binders  -DEXA scan per Endo team     9) proteinuria -likely multifactorial due to advanced age of kidney/allograft nephropathy, paraproteinemia       8) age appropriate ca screening - needs to remain up to date with mammogram an colonoscopy      - needs to f/u with Derm Yearly - pt will call to make appt     11) depression - Patient states depression is well-controlled with the current regimen      - per Psychiatry team     12) question of renal art stenosis - not evident on study     13) MGUS/IgG Kappa now with myeloma - follows with Hematology/Oncology      -patient may require chemotherapy in the near future  - started on treatment with ixazomib but had a reaction    -patient had reaction to 2nd agent to also with bortezomib  - started on then on lenalidomide which was increased on 8/5     14) Fatigue - likely anemia, MM, and uremia related     15) hyponatremia - improved       16) pancreas transplant -      - failed pancreas allograft - following with Endocrine     17) Volume - per dialysis team    18) zoster     -zoster with mult episodes  - now resolved     19) aortic regurg - on ECHO July with grade I dCHF     20) subclavian art stenosis on L     21) acid/base - hold bicarb tabs     22) hypermag - monitoring per dialysis team  Messaged Nephrologist    I spent 20 minutes directly with the patient during this visit      VIRTUAL VISIT DISCLAIMER    Janny Disla acknowledges that she has consented to an online visit or consultation  She understands that the online visit is based solely on information provided by her, and that, in the absence of a face-to-face physical evaluation by the physician, the diagnosis she receives is both limited and provisional in terms of accuracy and completeness  This is not intended to replace a full medical face-to-face evaluation by the physician  Janny Disla understands and accepts these terms

## 2020-08-10 NOTE — PATIENT INSTRUCTIONS
1) Avoid NSAIDS - (Example - motrin, advil, ibuprofen, aleve, exederin, etc)  2) Always follow a low salt diet  3) please hold sodium bicarb tablets  4) please check BMP and tacrolimus labs in one month  5) appointment in 2 months - we will call you to schedule

## 2020-08-25 PROBLEM — E10.65 TYPE 1 DIABETES MELLITUS WITH HYPERGLYCEMIA (HCC): Status: ACTIVE | Noted: 2020-01-01

## 2020-08-25 NOTE — PROGRESS NOTES
Virtual Regular Visit      Assessment/Plan:    Problem List Items Addressed This Visit     None               Reason for visit is DM, hypothyroidism  Chief Complaint   Patient presents with    Virtual Regular Visit        Encounter provider Leana Larose PA-C    Provider located at [address] [address]      Recent Visits  No visits were found meeting these conditions  Showing recent visits within past 7 days and meeting all other requirements     Future Appointments  No visits were found meeting these conditions  Showing future appointments within next 150 days and meeting all other requirements        The patient was identified by name and date of birth  Margot Napoles was informed that this is a telemedicine visit and that the visit is being conducted through telephone  My office door was closed  No one else was in the room  She acknowledged consent and understanding of privacy and security of the video platform  The patient has agreed to participate and understands they can discontinue the visit at any time  Patient is aware this is a billable service  Subjective  Refugio Abdul Sammie Peabody is a 63 y o  female here for follow-up of type 1 DM  She has a PMH of pancreatic/kidney transplant, CKD, multiple myeloma, hypertension, hyperlipidemia  She is currently on hemodialysis  She is currently on Toujeo 6 units QHS and Humalog scale with meals if needed  She is also on statin therapy  Her diabetic eye exam and foot exam are UTD  She is checking BG levels 4 times a day  This morning her BG was 106 mg/dl  She reports BG levels range from 100-150 mg/dl  Denies recent hypoglycemic episodes; she does have hypoglycemia awareness  For hypothyroidism she is taking levothyroxine 125 mcg 1 tablet daily on an empty stomach 1 hour before breakfast and at least 4 hours apart from supplements       Component      Latest Ref Rng & Units 5/15/2020 8/21/2020   Sodium      136 - 145 mmol/L 142 138   Potassium      3 5 - 5 3 mmol/L 3 6 4 7   Chloride      100 - 108 mmol/L 107 106   CO2      21 - 32 mmol/L 22 26   Anion Gap      4 - 13 mmol/L 13 6   BUN      5 - 25 mg/dL 53 (H) 29 (H)   Creatinine      0 60 - 1 30 mg/dL 3 81 (H) 4 25 (H)   GLUCOSE FASTING      65 - 99 mg/dL  81   Calcium      8 3 - 10 1 mg/dL 8 2 (L) 8 2 (L)   AST      5 - 45 U/L  17   ALT      12 - 78 U/L  31   Alkaline Phosphatase      46 - 116 U/L  94   Total Protein      6 4 - 8 2 g/dL  7 3   Albumin      3 5 - 5 0 g/dL  3 3 (L)   TOTAL BILIRUBIN      0 20 - 1 00 mg/dL  0 62   eGFR      ml/min/1 73sq m 13 11   Glucose, Random      65 - 140 mg/dL 78    Hemoglobin A1C      Normal 3 8-5 6%; PreDiabetic 5 7-6 4%;  Diabetic >=6 5%; Glycemic control for adults with diabetes <7 0% % 4 2 (L) 5 2   eAG, EST AVG Glucose      mg/dl 74 103   Free T4      0 76 - 1 46 ng/dL  1 03   TSH 3RD GENERATON      0 358 - 3 740 uIU/mL  3 130       Past Medical History:   Diagnosis Date    Abnormal liver function test     [facility] [address] [facility]     Acute on chronic congestive heart failure (HCC)     Allergic urticaria     Anemia     Cancer (HCC)     Multiple myeloma    Cervical dysplasia     Cholelithiasis     Chronic diastolic (congestive) heart failure ([facility] [address] ) 9/18/2017    Diabetes mellitus (HCC)     Previous, controlled with diet    Diabetes mellitus with foot ulcer ([address] )     Disease of thyroid gland     Encephalopathy     Hematuria     + leak est- secondary to UTIs/panc drainage    History of transfusion     Hyperkalemia     Hypertension     Iliotibial band syndrome     Lumbar radiculopathy     Multiple myeloma (HCC)     Multiple myeloma (HCC)     Night blindness     Nonrheumatic aortic (valve) insufficiency     Pneumonia     Renal disorder     Retinopathy     Seborrhea     Seizure ([address] )     Shingles     Sinus tachycardia     B blocker - cardio echo stress test 02 normal/neg LE doppler 2/02 OK and 12/07    Status post simultaneous kidney and pancreas transplant ([address] )     Toe amputation status     Trochanteric bursitis        Past Surgical History:   Procedure Laterality Date    CATARACT EXTRACTION      CHOLECYSTECTOMY      COLONOSCOPY      two polyps in the rectum removed and biopsied diverticulosis in the sigmoid colon, external hemorrhoiods- Dr Barfield    COMBINED KIDNEY-PANCREAS TRANSPLANT N/A     CT BONE MARROW BIOPSY AND ASPIRATION  4/17/2019    CYSTOSCOPY N/A 10/13/2016    Procedure: CYSTOSCOPY, retrograde pyelogram, biopsy of ureteral polyp; Surgeon: Faith Mcbride MD;  [facility];  Service:    Corinne Quiñones DILATION AND CURETTAGE OF UTERUS      ESOPHAGOGASTRODUODENOSCOPY N/A 11/20/2017    Procedure: ESOPHAGOGASTRODUODENOSCOPY (EGD); Surgeon: Luci De La Cruz DO;  [facility]: [facility]; Service: Gastroenterology    EYE SURGERY      cataracts    FOOT AMPUTATION THROUGH METATARSAL Left     FOOT SURGERY Right     excision of metatarsal heads    HALLUX VALGUS CORRECTION Right     IR TUNNELED DIALYSIS CATHETER PLACEMENT  5/5/2020    NEPHRECTOMY TRANSPLANTED ORGAN      PANCREATIC TRANSPLANT REMOVAL  1998    [place] ANASTOMOSIS,AV,ANY SITE Right 7/13/2020    Procedure: Creation of right brachiocephalic fistula;   Surgeon: Justin Dunn MD;  [facility];  Service: Vascular       Current Outpatient Medications   Medication Sig Dispense Refill    amLODIPine (NORVASC) 10 mg tablet Take 1 tablet (10 mg total) by mouth daily at bedtime 30 tablet 0    ARIPiprazole (ABILIFY) 30 mg tablet Take 1 tablet (30 mg total) by mouth daily at bedtime 90 tablet 2    aspirin 81 MG tablet Take 1 tablet by mouth daily      busPIRone (BUSPAR) 10 mg tablet Take 1 tablet (10 mg total) by mouth 2 (two) times a day 180 tablet 2    Cholecalciferol (VITAMIN D3) 1000 units CAPS Take 3 capsules by mouth daily        CRANBERRY PO Take by mouth      doxazosin (CARDURA) 2 mg tablet Take 2 tablets (4 mg total) by mouth daily at bedtime 180 tablet 3    DULoxetine (CYMBALTA) 60 mg delayed release capsule Take 1 capsule (60 mg total) by mouth 2 (two) times a day 180 capsule 2    folic acid (FOLVITE) 1 mg tablet TAKE 1 TABLET BY MOUTH EVERY DAY AS DIRECTED 90 tablet 3    hydrALAZINE (APRESOLINE) 100 MG tablet Take 1 tablet (100 mg total) by mouth 3 (three) times a day 270 tablet 0    insulin glargine (Toujeo Max SoloStar) 300 units/mL CONCETRATED U-300 injection pen (2-unit dial) Inject 6 Units under the skin daily at bedtime      insulin lispro (HumaLOG KwikPen) 100 units/mL injection pen Use up to 20 units daily via sliding scale 15 pen 1    lenalidomide (REVLIMID) 2 5 MG CAPS Take 2 5 mg every day [facility] # [id number] 8/3/20 28 capsule 0    levothyroxine 125 mcg tablet TAKE 1 TABLET BY MOUTH EVERY DAY 90 tablet 0    LORazepam (ATIVAN) 2 mg tablet Take 1 tablet (2 mg total) by mouth 3 (three) times a day as needed for anxiety 90 tablet 4    metoprolol tartrate (LOPRESSOR) 50 mg tablet TAKE 1 TABLET TWICE DAILY 180 tablet 0    pravastatin (PRAVACHOL) 80 mg tablet TAKE 1 TABLET BY MOUTH DAILY 90 tablet 5    predniSONE 5 mg tablet TAKE 1 TABLET(5 MG TOTAL) BY MOUTH DAILY 90 tablet 3    rOPINIRole (REQUIP) 0 25 mg tablet TAKE 1 TABLET TWICE DAILY 180 tablet 1    sertraline (ZOLOFT) 100 mg tablet Take 2 tablets (200 mg total) by mouth daily 180 tablet 2    sevelamer (RENAGEL) 800 mg tablet Take 2 tablets (1,600 mg total) by mouth 3 (three) times a day with meals 90 tablet 1    sodium bicarbonate 650 mg tablet Take 3 tablets (1,950 mg total) by mouth 3 (three) times a day 180 tablet 0    tacrolimus (PROGRAF) 1 mg capsule TAKE 2 CAPSULES BY MOUTH EVERY TWELVE HOURS (Patient taking differently: Taking 2 mg in the am and 1 mg in the pm) 120 capsule 0    torsemide (DEMADEX) 20 mg tablet TAKE 1 TABLET DAILY 90 tablet 3     No current facility-administered medications for this visit  Allergies   Allergen Reactions    Ixazomib Rash     Ninlaro    Revlimid [Lenalidomide] Rash    Cefadroxil      Tolerated cefepime 2019    Morphine Other (See Comments)     Other reaction(s): Other (See Comments)  projectile vomiting    Morphine And Related GI Intolerance    Myrbetriq [Mirabegron] Hives    Penicillins Hives     Other reaction(s): Other (See Comments)  Respiratory Distress,hives  Tolerates cefazolin       Review of Systems   Constitutional: Positive for fatigue (attributes it to dialysis)  Negative for activity change, appetite change and unexpected weight change  HENT: Negative for trouble swallowing  Eyes: Negative for visual disturbance  Respiratory: Negative for shortness of breath  Cardiovascular: Negative for chest pain and palpitations  Gastrointestinal: Positive for constipation and diarrhea  Endocrine: Negative for polydipsia and polyuria  Musculoskeletal: Negative  Skin: Negative for wound  Neurological: Positive for numbness  Psychiatric/Behavioral: Negative  Video Exam    There were no vitals filed for this visit  Physical Exam - no video      PLAN  Type 1 DM  HGA1C 5 2%  Treatment regimen: A1C likely not reliable due to ESRD and anemia  Continue current treatment  Send BG log  Discussed intensive insulin regimen does increase risk for hypoglycemia  Episodes of hypoglycemia can lead to permanent disability and death  Discussed risks/complications associated with uncontrolled diabetes  Advised to adhere to diabetic diet  Keep carbohydrates consistent to limit blood glucose fluctuations  Advised to call if blood sugars less than 70 mg/dl or over 300 mg/dl  Check blood glucose 4 times a day  Discussed symptoms and treatment of hypoglycemia  Recommended routine follow-up with podiatry and ophthalmology  Ordered blood work to complete prior to next visit    Hyperlipidemia  Continue statin therapy  Managed by PCP  Hypothyroidism  TSH normal at 3 130 and free T4 1 04  Continue current dose of levothyroxine  ESRD  On dialysis   Managed by nephrology      I spent 25 minutes directly with the patient during this visit   It was my intent to perform this visit via video technology but the patient was not able to do a video connection so the visit was completed via audio telephone only  Tristin Malloy PA-C        VIRTUAL VISIT DISCLAIMER    Cherelleamelie Joycelynaman acknowledges that she has consented to an online visit or consultation  She understands that the online visit is based solely on information provided by her, and that, in the absence of a face-to-face physical evaluation by the physician, the diagnosis she receives is both limited and provisional in terms of accuracy and completeness  This is not intended to replace a full medical face-to-face evaluation by the physician  Kayla Alfaro understands and accepts these terms

## 2020-08-25 NOTE — PATIENT INSTRUCTIONS
Hypoglycemia instructions   mOar Roldan  8/25/2020  [id number]    Low Blood Sugar    Steps to treat low blood sugar  1  Test blood sugar if you have symptoms of low blood sugar:   Low Blood Sugar Symptoms:  o Sweaty  o Dizzy  o Rapid heartbeat  o Shaky  o Bad mood  o Hungry      2  Treat blood sugar less than 70 with 15 grams of fast-acting carbohydrate:   Examples of 15 grams Fast-Acting Carbohydrate:  o 4 oz juice  o 4 oz regular soda  o 3-4 glucose tablets (chew)  o 3-4 hard candies (chew)          3  Wait 15 minutes and test your blood sugar again     4   If blood sugar is less than 100, repeat steps 2-3     5  When your blood sugar is 100 or more, eat a snack if it will be longer than one hour until your next meal  The snack should be 15 grams of carbohydrate and a protein:   Examples of snacks:  o ½ sandwich  o 6 crackers with cheese  o Piece of fruit with cheese or peanut butter  o 6 crackers with peanut butter

## 2020-09-02 NOTE — TELEPHONE ENCOUNTER
Patient called in to cancel today's appointment 09/02/2020 reached out to the office and advised - patient will call back to r/s

## 2020-09-02 NOTE — TELEPHONE ENCOUNTER
[address] calling for refill of Revlimid    Patient previously getting this from [facility] - now needs to come from [facility] fax - [phone]  Phone [phone]    Per patient she has only 2 capsules remaining - [facility] requesting the Rx be sent asap    Will forward to RN with Dr Melissa Mullins

## 2020-09-11 NOTE — TELEPHONE ENCOUNTER
Pharmacy has not yet received Rx - I explained this was faxed yesterday  Verbal prescription given to [facility]      Prescription marked as URGENT     They will be reaching out to the patient today to arrange delivery     Patient made aware

## 2020-09-14 NOTE — TELEPHONE ENCOUNTER
Please inform pt that blood sugars are fairly well controlled   Continue current regimen     Lg Mcknight MD

## 2020-09-16 PROBLEM — N39.0 UTI (URINARY TRACT INFECTION): Status: ACTIVE | Noted: 2020-01-01

## 2020-09-16 PROBLEM — E87.5 HYPERKALEMIA: Status: ACTIVE | Noted: 2020-01-01

## 2020-09-16 NOTE — CONSULTS
Consultation - Nephrology   Angeles Garcia 63 y o  female MRN: [id number]  Unit/Bed#: ED 10 Encounter: [id number]    ASSESSMENT/PLAN:   1  ESRD: on HD TTS at [facility]  S/p failed renal transplant and started on HD May 2020  · Missed recent treatments and last HD 9/10   · Will dialyze today and likely again tomorrow   ·  and missed 1 week of HD so will run at 300 blood flow today   · currently on tacrolimus and prednisone 5mg daily   · Goal tac level 2-4  Will check am tac level   · Discussed with Dr Nadia Fairchild of transplant--no dose adjustments for now unless tac level above goal   2  Hyperkalemia: K 6 6 with only slight hemolysis  · Repeat pending per ER   · Start with 2K bath today and await repeat   · Per outpatient HD med list she is supposed to take veltassa every Sunday   3  Suspected sepsis: consider urinary source as she has had multiple UTIs in the past   · If blood cultures positive may need HD cath removal   · Blood cultures and UA pending   · Per discussion with Dr June Rodas, patient has pancrease transplant which drains into bladder and would consider pancreatitis as well-- will check lipase and amylase   4  Anion gap metabolic acidosis: in the setting of missing HD, diarrhea and suspected sepsis   · Lactic acid 1 5  5  Hypotension: hold home BP meds for now and monitor on IVF  6  Anemia of CKD: hgb 9 6 today   · On epogen 8000 units qHD   7  Hyponatremia: due to ESRD  Monitor with dialysis   8  Mineral Bone Disease of CKD: phos 8 2   9  Multiple Myeloma: on revlimid per hematology   10  Access: R permacath     · Also has RUE AVF placed 7/13/2020 and duplex 8/31 shows that access appears to be mature but is too deep -- following up with vascular on 9/25 and may need superficialization     HISTORY OF PRESENT ILLNESS:  Requesting Physician: Natty Reddy DO  Reason for Consult: ESRD on HD     Angeles Garcia is a 63y o  year old female who was admitted to [facility] with confusion and weakness  Patient complains of recent abdominal pain and dysuria but otherwise is confused so most of the history comes from the chart  She started with diarrhea and some vomiting about 4 days ago and has been getting progressively more weak and confused since that time  She has ESRD on hemodialysis TTS but has not been at dialysis since 9/10 due to not feeling well  Of note she does have a history of many admissions in the past for UTI/confusion/dehydration         PAST MEDICAL HISTORY:  Past Medical History:   Diagnosis Date    Abnormal liver function test     Acute kidney injury ([address] )     Acute on chronic congestive heart failure (HCC)     Allergic urticaria     Anemia     Cancer (HCC)     Multiple myeloma    Cervical dysplasia     Cholelithiasis     Chronic diastolic (congestive) heart failure ([address] ) 9/18/2017    Diabetes mellitus (HCC)     Previous, controlled with diet    Diabetes mellitus with foot ulcer ([address] )     Disease of thyroid gland     Encephalopathy     Hematuria     + leak est- secondary to UTIs/panc drainage    History of transfusion     Hyperkalemia     Hypertension     Iliotibial band syndrome     Lumbar radiculopathy     Multiple myeloma (HCC)     Multiple myeloma (HCC)     Night blindness     Nonrheumatic aortic (valve) insufficiency     Pneumonia     Renal disorder     Retinopathy     Seborrhea     Seizure ([address] )     Shingles     Sinus tachycardia     B blocker - cardio echo stress test 02 normal/neg LE doppler 2/02 OK and 12/07    Status post simultaneous kidney and pancreas transplant ([address] )     Toe amputation status     Trochanteric bursitis        PAST SURGICAL HISTORY:  Past Surgical History:   Procedure Laterality Date    CATARACT EXTRACTION      CHOLECYSTECTOMY      COLONOSCOPY      two polyps in the rectum removed and biopsied diverticulosis in the sigmoid colon, external hemorrhoiods- Dr Barfield    COMBINED KIDNEY-PANCREAS TRANSPLANT N/A     CT BONE MARROW BIOPSY AND ASPIRATION  2019    CYSTOSCOPY N/A 10/13/2016    Procedure: CYSTOSCOPY, retrograde pyelogram, biopsy of ureteral polyp; Surgeon: Carlotta Bower MD;  [facility];  Service:    [facility] DILATION AND CURETTAGE OF UTERUS      ESOPHAGOGASTRODUODENOSCOPY N/A 2017    Procedure: ESOPHAGOGASTRODUODENOSCOPY (EGD); Surgeon: Blanca Rosario DO;  [facility]: [facility]; Service: Gastroenterology    EYE SURGERY      cataracts    FOOT AMPUTATION THROUGH METATARSAL Left     FOOT SURGERY Right     excision of metatarsal heads    HALLUX VALGUS CORRECTION Right     IR TUNNELED DIALYSIS CATHETER PLACEMENT  2020    NEPHRECTOMY TRANSPLANTED ORGAN      PANCREATIC TRANSPLANT REMOVAL      [place] ANASTOMOSIS,AV,ANY SITE Right 2020    Procedure: Creation of right brachiocephalic fistula; Surgeon: Brenda Dunn MD;  [facility];  Service: Vascular       ALLERGIES:  Allergies   Allergen Reactions    Ixazomib Rash     Ninlaro    Revlimid [Lenalidomide] Rash    Cefadroxil      Tolerated cefepime     Morphine Other (See Comments)     Other reaction(s): Other (See Comments)  projectile vomiting    Morphine And Related GI Intolerance    Myrbetriq [Mirabegron] Hives    Penicillins Hives     Other reaction(s):  Other (See Comments)  Respiratory Distress,hives  Tolerates cefazolin       SOCIAL HISTORY:  Social History     Substance and Sexual Activity   Alcohol Use Never    Frequency: Never    Binge frequency: Never     Social History     Substance and Sexual Activity   Drug Use Never    Types: Hydrocodone    Comment: Past cocaine use many years ago - no current use     Social History     Tobacco Use   Smoking Status Former Smoker    Last attempt to quit: 2012    Years since quittin 3   Smokeless Tobacco Never Used       FAMILY HISTORY:  Family History   Problem Relation Age of Onset    Hypertension Mother     Cancer Mother     Hypertension Father     Cancer Father     Cancer Maternal Grandfather     Cancer Paternal Grandmother     Cancer Paternal Grandfather     Depression Sister     Breast cancer Maternal Grandmother 77       MEDICATIONS:  Scheduled Meds:  Current Facility-Administered Medications   Medication Dose Route Frequency Provider Last Rate    multi-electrolyte  1,000 mL Intravenous Once Mary Ann Piper MD         REVIEW OF SYSTEMS:  A complete review of systems was done  Pertinent positives and negatives noted in the HPI but otherwise the review of systems is negative  PHYSICAL EXAM:  Current Weight: Weight - Scale: 91 6 kg (202 lb)  First Weight: Weight - Scale: 91 6 kg (202 lb)  Vitals:    09/16/20 1150   BP: (!) 102/46   Pulse: 69   Resp: 16   Temp: (!) 91 1 °F (32 8 °C)   SpO2: 92%     General:  No acute distress  Skin:  No rash, skin dry   Eyes:  Sclerae anicteric  ENT:  Dry mucous membranes  Neck:  Trachea midline with no JVD  Chest:  Clear to auscultation bilaterally  CVS:  Regular rate and rhythm  Abdomen:  Soft, nontender, nondistended  Extremities:  No edema  Neuro:  Awake but confused and only oriented x 1   Psych: cooperative       Lab Results:   Results from last 7 days   Lab Units 09/16/20  1009   WBC Thousand/uL 7 83   HEMOGLOBIN g/dL 9 6*   HEMATOCRIT % 31 0*   PLATELETS Thousands/uL 240   SODIUM mmol/L 133*   POTASSIUM mmol/L 6 6*   CHLORIDE mmol/L 109*   CO2 mmol/L 7*   BUN mg/dL 116*   CREATININE mg/dL 12 90*   CALCIUM mg/dL 8 7   MAGNESIUM mg/dL 4 1*   PHOSPHORUS mg/dL 8 2*       Radiology Results:   CT head without contrast   Final Result by Amarilys Tubbs MD (09/16 1053)      No acute intracranial abnormality                             Workstation performed: [id number]

## 2020-09-16 NOTE — ASSESSMENT & PLAN NOTE
· H/o recurrent UTI/pyelonephritis  · Complains of suprapubic and shad-umbilical pain  · Hypothermic at 90 4 F on arrival to the ED  · H/o pancreatic and renal transplant  · Urine ex - innumerable WBC's  · Received 1 dose of cefepime 2 gm IV in the ED  · Ct Cefepime  · Check CT abdomen/pelvis  · F/u urine and blood cultures  · Monitor temp, WBC

## 2020-09-16 NOTE — ED ATTENDING ATTESTATION
9/16/2020  Mel MENA, DO, saw and evaluated the patient  I have discussed the patient with the resident/non-physician practitioner and agree with the resident's/non-physician practitioner's findings, Plan of Care, and MDM as documented in the resident's/non-physician practitioner's note, except where noted  All available labs and Radiology studies were reviewed  I was present for key portions of any procedure(s) performed by the resident/non-physician practitioner and I was immediately available to provide assistance  At this point I agree with the current assessment done in the Emergency Department  I have conducted an independent evaluation of this patient a history and physical is as follows:    Patient presents via EMS with her mother for complaint of generalized weakness that followed diarrhea 4 days ago and vomiting yesterday  Patient has end-stage renal disease on dialysis but missed 2 treatments due to her current illness  She has a h/o dehydration which makes her weak and confused  She has been progressively getting more weak confused since the diarrhea started  Despite CKD, she urinates usually 3-5 times daily but does not remember the last time she urinated since getting sick  She has lower abdominal pain and currently feels nauseated but denies dysuria  She has a right upper chest wall port that appears clean, dry and intact  She has no focal deficits but has difficulty following commands, even simple ones, and has difficulty formulating answers to questions  No recent travel or similar sick contacts  ROS: Denies f/c, HA, CP, SOB  12 system ROS o/w negative       PE: NAD, appears fatigued, alert but "distant"; PERRL, EOMI; MMM, no posterior oropharyngeal exudate, edema or erythema; HRR, systolic ejection murmur, monitor shows sinus rhythm at 65 bpm with hyperacute T-waves compared to previous; lungs CTA w/o w/r/r, POx 98% on RA (nl); abdomen s/nd, mild TTP across lower abdomen, nl BS in all 4 quadrant; (-) LE edema or calf TTP, FROM extremities x4, normal patellar DTRs; skin pale/w/d; CNs GI/NF, oriented  DDx:  Weakness/malaise/vomiting/diarrhea - acute on chronic kidney failure with electrolyte disturbance, dehydration, occult infection (UTI), early sepsis, doubt stroke or ACS/MI  A/P: Will check metabolic/septic w/u, treat symptoms, reevaluate for further work up and disposition  ED Course  ED Course as of Sep 16 1212   Wed Sep 16, 2020   1035 Better than baseline  Heme concentration due to dehydration? IVF infusing  Hemoglobin(!): 9 6         Critical Care Time  CriticalCare Time  Performed by: Alfonso Peralta DO  Authorized by:  Alfonso Peralta DO     Critical care provider statement:     Critical care time (minutes):  30    Critical care time was exclusive of:  Separately billable procedures and treating other patients and teaching time    Critical care was necessary to treat or prevent imminent or life-threatening deterioration of the following conditions:  CNS failure or compromise, renal failure and metabolic crisis    Critical care was time spent personally by me on the following activities:  Obtaining history from patient or surrogate, development of treatment plan with patient or surrogate, discussions with consultants, evaluation of patient's response to treatment, examination of patient, ordering and performing treatments and interventions, ordering and review of laboratory studies, ordering and review of radiographic studies, re-evaluation of patient's condition and review of old charts    I assumed direction of critical care for this patient from another provider in my specialty: no

## 2020-09-16 NOTE — PLAN OF CARE
Post-Dialysis RN Treatment Note    Blood Pressure:  Pre 92/36 mm/Hg  Post 151/61 mmHg   EDW  NA kg    Weight:  Pre None Reported kg   Post None Reported kg   Mode of weight measurement: N/A   Volume Removed  500 ml    Treatment duration 180 minutes    NS given  No    Treatment shortened? No   Medications given during Rx solumedrol 4 mg, Epogen 8000 units, Hectorol 1 mcg   Estimated Kt/V  None Reported   Access type: Permacath/TDC   Access Status: Yes, describe: BFR maintained at 300    Report called to primary nurse   Yes     Temperature at 98 4 upon end of treatment  Confusion continues to everything except who she is  Current plan of care is to keep even for hemodialysis treatment today  Monitor vital signs every 15 minutes while on treatment for patient safety  Utilize a 2 K+ bath for 1 hour then 1 K+ bath for 2 hour for serum potassium of 6 9 to maintain electrolyte balance        Problem: METABOLIC, FLUID AND ELECTROLYTES - ADULT  Goal: Electrolytes maintained within normal limits  Description: INTERVENTIONS:  - Monitor labs and assess patient for signs and symptoms of electrolyte imbalances  - Administer electrolyte replacement as ordered  - Monitor response to electrolyte replacements, including repeat lab results as appropriate  - Instruct patient on fluid and nutrition as appropriate  Outcome: Progressing  Goal: Fluid balance maintained  Description: INTERVENTIONS:  - Monitor labs   - Monitor I/O and WT  - Instruct patient on fluid and nutrition as appropriate  - Assess for signs & symptoms of volume excess or deficit  Outcome: Progressing

## 2020-09-16 NOTE — ED PROVIDER NOTES
History  Chief Complaint   Patient presents with    Weakness - Generalized     Pt reports missing dialysis for a week  Gen weakness/"not feeling well " Pt reports lower abd pain, denies n/v     64 y/o female with PMHx of DM, MM, CKD on dialysis (Tuesday/Thursday/Saturday), and prior pancreas and renal transplant presents to the ED via EMS with generalized weakness and confusion that started yesterday, worsening today  She missed her dialysis appointment Saturday (4 days ago) due to diarrhea and she missed her follow-up dialysis appointment yesterday due to nausea and vomiting  She states that she started experiencing cramping lower abdominal pain when EMS arrived to take her to the hospital today  Her mother is present and reports that she seemed confused yesterday, slow to respond to questions and could not remember where she left her phone  Prior to Admission Medications   Prescriptions Last Dose Informant Patient Reported? Taking?    ARIPiprazole (ABILIFY) 30 mg tablet  Self No No   Sig: Take 1 tablet (30 mg total) by mouth daily at bedtime   CRANBERRY PO  Self Yes No   Sig: Take by mouth   Cholecalciferol (VITAMIN D3) 1000 units CAPS  Self Yes No   Sig: Take 1 capsule by mouth daily    DULoxetine (CYMBALTA) 60 mg delayed release capsule  Self No No   Sig: Take 1 capsule (60 mg total) by mouth 2 (two) times a day   LORazepam (ATIVAN) 2 mg tablet  Self No No   Sig: Take 1 tablet (2 mg total) by mouth 3 (three) times a day as needed for anxiety   amLODIPine (NORVASC) 10 mg tablet  Self No No   Sig: Take 1 tablet (10 mg total) by mouth daily at bedtime   aspirin 81 MG tablet  Self Yes No   Sig: Take 1 tablet by mouth daily   busPIRone (BUSPAR) 10 mg tablet  Self No No   Sig: Take 1 tablet (10 mg total) by mouth 2 (two) times a day   doxazosin (CARDURA) 2 mg tablet  Self No No   Sig: Take 2 tablets (4 mg total) by mouth daily at bedtime   folic acid (FOLVITE) 1 mg tablet  Self No No   Sig: TAKE 1 TABLET BY MOUTH EVERY DAY AS DIRECTED   hydrALAZINE (APRESOLINE) 100 MG tablet  Self No No   Sig: Take 1 tablet (100 mg total) by mouth 3 (three) times a day   insulin glargine (Toujeo Max SoloStar) 300 units/mL CONCETRATED U-300 injection pen (2-unit dial)  Self Yes No   Sig: Inject 6 Units under the skin daily at bedtime   insulin lispro (HumaLOG KwikPen) 100 units/mL injection pen   No No   Sig: Use up to 20 units daily via sliding scale   lenalidomide (REVLIMID) 2 5 MG CAPS   No No   Sig: Take 2 5 mg every day [facility] # [id number] 9/3/20   levothyroxine 125 mcg tablet   No No   Sig: TAKE 1 TABLET BY MOUTH EVERY DAY   metoprolol tartrate (LOPRESSOR) 50 mg tablet  Self No No   Sig: TAKE 1 TABLET TWICE DAILY   pravastatin (PRAVACHOL) 80 mg tablet  Self No No   Sig: TAKE 1 TABLET BY MOUTH DAILY   predniSONE 5 mg tablet  Self No No   Sig: TAKE 1 TABLET(5 MG TOTAL) BY MOUTH DAILY   rOPINIRole (REQUIP) 0 25 mg tablet  Self No No   Sig: TAKE 1 TABLET TWICE DAILY   sertraline (ZOLOFT) 100 mg tablet  Self No No   Sig: Take 2 tablets (200 mg total) by mouth daily   sevelamer (RENAGEL) 800 mg tablet  Self No No   Sig: Take 2 tablets (1,600 mg total) by mouth 3 (three) times a day with meals   sodium bicarbonate 650 mg tablet  Self No No   Sig: Take 3 tablets (1,950 mg total) by mouth 3 (three) times a day   tacrolimus (PROGRAF) 1 mg capsule  Self No No   Sig: TAKE 2 CAPSULES BY MOUTH EVERY TWELVE HOURS   Patient taking differently: Taking 2 mg in the am and 1 mg in the pm   torsemide (DEMADEX) 20 mg tablet  Self No No   Sig: TAKE 1 TABLET DAILY      Facility-Administered Medications: None       Past Medical History:   Diagnosis Date    Abnormal liver function test     [facility] kidney [facility]     Acute on chronic congestive heart failure (HCC)     Allergic urticaria     Anemia     Cancer (HCC)     Multiple myeloma    Cervical dysplasia     Cholelithiasis     Chronic diastolic (congestive) heart failure (HCC) 9/18/2017    Diabetes mellitus ([facility] )     Previous, controlled with diet    [facility]     Disease of thyroid gland     Encephalopathy     Hematuria     + leak est- secondary to UTIs/panc drainage    History of transfusion     Hyperkalemia     Hypertension     Iliotibial band syndrome     Lumbar radiculopathy     Multiple myeloma (HCC)     Multiple myeloma (HCC)     Night blindness     Nonrheumatic aortic (valve) insufficiency     Pneumonia     Renal disorder     Retinopathy     Seborrhea     Seizure [facility]     Shingles     Sinus tachycardia     B blocker - cardio echo stress test 02 normal/neg LE doppler 2/02 OK and 12/07    Status post simultaneous kidney and pancreas [facility]     Toe amputation status     Trochanteric bursitis        Past Surgical History:   Procedure Laterality Date    CATARACT EXTRACTION      CHOLECYSTECTOMY      COLONOSCOPY      two polyps in the rectum removed and biopsied diverticulosis in the sigmoid colon, external hemorrhoiods- Dr Barfield    COMBINED KIDNEY-PANCREAS TRANSPLANT N/A     CT BONE MARROW BIOPSY AND ASPIRATION  4/17/2019    CYSTOSCOPY N/A 10/13/2016    Procedure: CYSTOSCOPY, retrograde pyelogram, biopsy of ureteral polyp; Surgeon: Patrick Sevilla MD;  [facility]: [facility];  Service:    Alysia Hamlin DILATION AND CURETTAGE OF UTERUS      ESOPHAGOGASTRODUODENOSCOPY N/A 11/20/2017    Procedure: ESOPHAGOGASTRODUODENOSCOPY (EGD); Surgeon: Albin Ocasio DO;  Location: [facility]; Service: Gastroenterology    EYE SURGERY      cataracts    FOOT AMPUTATION THROUGH METATARSAL Left     FOOT SURGERY Right     excision of metatarsal heads    HALLUX VALGUS CORRECTION Right     IR TUNNELED DIALYSIS CATHETER PLACEMENT  5/5/2020    NEPHRECTOMY TRANSPLANTED ORGAN      PANCREATIC TRANSPLANT REMOVAL  1998    MN ANASTOMOSIS,AV,ANY SITE Right 7/13/2020    Procedure: Creation of right brachiocephalic fistula;   Surgeon: Alistair Jacob MD;  Location: [facility];  Service: Vascular       Family History   Problem Relation Age of Onset    Hypertension Mother     Cancer Mother     Hypertension Father     Cancer Father     Cancer Maternal Grandfather     Cancer Paternal Grandmother     Cancer Paternal Grandfather     Depression Sister     Breast cancer Maternal Grandmother 77     I have reviewed and agree with the history as documented  E-Cigarette/Vaping    E-Cigarette Use Never User      E-Cigarette/Vaping Substances    Nicotine No     THC No     CBD No     Flavoring No     Other No     Unknown No      Social History     Tobacco Use    Smoking status: Former Smoker     Last attempt to quit: 2012     Years since quittin 4    Smokeless tobacco: Never Used   Substance Use Topics    Alcohol use: Never     Frequency: Never     Binge frequency: Never    Drug use: Never     Types: Hydrocodone     Comment: Past cocaine use many years ago - no current use        Review of Systems   Constitutional: Positive for chills  Negative for fever  HENT: Negative for congestion and rhinorrhea  Respiratory: Negative for cough and shortness of breath  Cardiovascular: Negative for chest pain, palpitations and leg swelling  Gastrointestinal: Positive for abdominal pain, diarrhea, nausea and vomiting  Genitourinary: Negative for dysuria and hematuria  Musculoskeletal: Negative for back pain and neck pain  Skin: Negative for rash  Neurological: Negative for syncope, weakness, light-headedness, numbness and headaches  Psychiatric/Behavioral: Positive for confusion  All other systems reviewed and are negative        Physical Exam  ED Triage Vitals   Temperature Pulse Respirations Blood Pressure SpO2   20 1034 20 0939 20 0939 20 0939 20 0939   (!) 90 4 °F (32 4 °C) 63 15 (!) 92/38 98 %      Temp Source Heart Rate Source Patient Position - Orthostatic VS BP Location FiO2 (%)   20 1034 20 0939 20 1899 09/16/20 0939 --   Rectal Monitor Lying Left arm       Pain Score       09/16/20 0940       8             Orthostatic Vital Signs  Vitals:    09/22/20 1130 09/22/20 1200 09/22/20 1230 09/22/20 1240   BP: 158/74 153/61 158/85 163/69   Pulse: 88 96 96 95   Patient Position - Orthostatic VS: Lying   Lying       Physical Exam  Vitals signs and nursing note reviewed  Constitutional:       General: She is in acute distress  Appearance: She is normal weight  She is ill-appearing  She is not diaphoretic  Comments: Adult female lying in bed, awake and alert but slow to respond to questions  She appears slightly pale and ill-appearing  HENT:      Head: Normocephalic and atraumatic  Right Ear: External ear normal       Left Ear: External ear normal       Nose: Nose normal       Mouth/Throat:      Mouth: Mucous membranes are dry  Pharynx: Oropharynx is clear  No oropharyngeal exudate or posterior oropharyngeal erythema  Eyes:      Extraocular Movements: Extraocular movements intact  Pupils: Pupils are equal, round, and reactive to light  Neck:      Musculoskeletal: Normal range of motion and neck supple  No muscular tenderness  Cardiovascular:      Rate and Rhythm: Normal rate and regular rhythm  Pulses: Normal pulses  Heart sounds: Normal heart sounds  No murmur  Pulmonary:      Effort: Pulmonary effort is normal  No respiratory distress  Breath sounds: Normal breath sounds  No wheezing, rhonchi or rales  Abdominal:      General: Abdomen is flat  Bowel sounds are normal       Palpations: Abdomen is soft  Tenderness: There is no abdominal tenderness  There is no right CVA tenderness, left CVA tenderness or guarding  Musculoskeletal:      Comments: LLE s/p TMA  RLE s/p great toe amputation  No LE swelling or calf tenderness  Skin:     General: Skin is dry  Capillary Refill: Capillary refill takes less than 2 seconds        Comments: Pale, skin is slightly cool to touch  Neurological:      General: No focal deficit present  Mental Status: She is alert  Sensory: No sensory deficit  Motor: No weakness  Comments: A&O x 3, but slow to respond to questions  Moving all extremities spontaneously, strength and sensation to light touch intact and symmetric bilateral upper and lower extremities  No facial droop  No dysarthria           ED Medications  Medications   epoetin rebecca (EPOGEN,PROCRIT) injection 8,000 Units (8,000 Units Intravenous Given 9/22/20 0948)   doxercalciferol (HECTOROL) injection 1 mcg (1 mcg Intravenous Given 9/22/20 0946)   ARIPiprazole (ABILIFY) tablet 30 mg (30 mg Oral Given 9/21/20 2205)   aspirin (ECOTRIN LOW STRENGTH) EC tablet 81 mg (81 mg Oral Given 9/22/20 0808)   busPIRone (BUSPAR) tablet 10 mg (10 mg Oral Given 9/81/25 1405)   folic acid (FOLVITE) tablet 1,000 mcg (1,000 mcg Oral Given 9/22/20 0808)   lenalidomide (REVLIMID) capsule 2 5 mg (2 5 mg Oral Given 9/22/20 0808)   levothyroxine tablet 125 mcg (125 mcg Oral Given 9/22/20 0517)   pravastatin (PRAVACHOL) tablet 80 mg (80 mg Oral Given 9/22/20 0808)   predniSONE tablet 5 mg (5 mg Oral Given 9/22/20 0808)   rOPINIRole (REQUIP) tablet 0 25 mg (0 25 mg Oral Given 9/22/20 0808)   sertraline (ZOLOFT) tablet 200 mg (200 mg Oral Given 9/22/20 0808)   heparin (porcine) subcutaneous injection 5,000 Units (5,000 Units Subcutaneous Given 9/22/20 1323)   ondansetron (ZOFRAN) injection 4 mg (4 mg Intravenous Given 9/22/20 1323)   acetaminophen (TYLENOL) tablet 650 mg (650 mg Oral Given 9/18/20 1356)   insulin lispro (HumaLOG) 100 units/mL subcutaneous injection 1-5 Units (1 Units Subcutaneous Not Given 9/22/20 1310)   insulin lispro (HumaLOG) 100 units/mL subcutaneous injection 1-5 Units (1 Units Subcutaneous Not Given 9/21/20 2206)   sodium bicarbonate tablet 1,300 mg (1,300 mg Oral Given 9/22/20 0808)   sevelamer (RENAGEL) tablet 800 mg (800 mg Oral Given 9/22/20 1323)   cefepime (MAXIPIME) 1,000 mg in dextrose 5 % 50 mL IVPB (0 mg Intravenous Stopped 9/22/20 1407)   tacrolimus (PROGRAF) capsule 2 mg (2 mg Oral Given 9/22/20 0808)   tacrolimus (PROGRAF) capsule 1 mg (1 mg Oral Given 9/21/20 2206)   oxyCODONE (ROXICODONE) IR tablet 2 5 mg (2 5 mg Oral Given 9/21/20 0804)   dextrose 5 % infusion (50 mL/hr Intravenous New Bag 9/22/20 1330)   HYDROmorphone (DILAUDID) injection 0 5 mg (0 5 mg Intravenous Given 9/21/20 1156)   al mag oxide-diphenhydramine-lidocaine viscous (MAGIC MOUTHWASH) suspension 10 mL (10 mL Swish & Swallow Not Given 9/22/20 1322)   pantoprazole (PROTONIX) injection 40 mg (40 mg Intravenous Given 9/22/20 0808)   multi-electrolyte (ISOLYTE-S PH 7 4) bolus 1,000 mL (0 mL Intravenous Stopped 9/16/20 1144)   ondansetron (ZOFRAN) injection 4 mg (4 mg Intravenous Given 9/16/20 1058)   fentanyl citrate (PF) 100 MCG/2ML 75 mcg (75 mcg Intravenous Given 9/16/20 1058)   multi-electrolyte (ISOLYTE-S PH 7 4) bolus 1,000 mL (0 mL Intravenous Stopped 9/16/20 1316)   cefepime (MAXIPIME) 2 g/50 mL dextrose IVPB (0 mg Intravenous Stopped 9/16/20 1512)   multi-electrolyte (ISOLYTE-S PH 7 4) bolus 500 mL (0 mL Intravenous Stopped 9/16/20 1557)   methylPREDNISolone sodium succinate (Solu-MEDROL) injection 4 mg (4 mg Intravenous Given 9/16/20 1656)   metoclopramide (REGLAN) injection 10 mg (10 mg Intravenous Given 9/17/20 2303)   metoclopramide (REGLAN) injection 5 mg (5 mg Intravenous Given 9/18/20 0308)   iohexol (OMNIPAQUE) 350 MG/ML injection (SINGLE-DOSE) 100 mL (100 mL Intravenous Given 9/20/20 1337)       Diagnostic Studies  Results Reviewed     Procedure Component Value Units Date/Time    Blood culture #1 [[id number]] Collected:  09/16/20 1030    Lab Status:  Final result Specimen:  Blood from Arm, Left Updated:  09/21/20 1302     Blood Culture No Growth After 5 Days      Blood culture #2 [[id number]] Collected:  09/16/20 1009    Lab Status:  Final result Specimen:  Blood from Arm, Left Updated:  09/21/20 1302     Blood Culture No Growth After 5 Days      Urine culture [[id number]]  (Abnormal)  (Susceptibility) Collected:  09/16/20 1318    Lab Status:  Final result Specimen:  Urine, Indwelling Moreno Catheter Updated:  09/18/20 1151     Urine Culture >100,000 cfu/ml Escherichia coli    Susceptibility     Escherichia coli (1)     Antibiotic Interpretation Microscan Method Status    ZID Performed  Yes  SARTHAK Final    Amoxicillin + Clavulanate Susceptible 8/4 ug/ml SARTHAK Final    Ampicillin ($$) Resistant >16 00 ug/ml SARTHAK Final    Ampicillin + Sulbactam ($) Intermediate 16/8 ug/ml SARTHAK Final    Aztreonam ($$$)  Susceptible <=4 ug/ml SARTHAK Final    Cefazolin ($) Susceptible <=2 00 ug/ml SARTHAK Final    Ciprofloxacin ($)  Resistant >2 00 ug/ml SARTHAK Final    Ertapenem ($$$) Susceptible <=0 5 ug/ml SARTHAK Final    Gentamicin ($$) Susceptible <=1 ug/ml SARTHAK Final    Levofloxacin ($) Resistant >4 00 ug/ml SARTHAK Final    Nitrofurantoin Intermediate 64 ug/ml SARTHAK Final    Tetracycline Resistant >8 ug/ml SARTHAK Final    Tobramycin ($) Susceptible 2 ug/ml SARTHAK Final    Trimethoprim + Sulfamethoxazole ($$$) Resistant >2/38 ug/ml SARTHAK Final                   Tacrolimus level [[id number]]  (Normal) Collected:  09/17/20 0845    Lab Status:  Final result Specimen:  Blood from Arm, Left Updated:  09/17/20 1857     TACROLIMUS 2 5 ng/mL     Lipase [[id number]]  (Normal) Collected:  09/17/20 1028    Lab Status:  Final result Specimen:  Blood from Arm, Left Updated:  09/17/20 1120     Lipase 237 u/L     Basic metabolic panel [[id number]]  (Abnormal) Collected:  09/16/20 2010    Lab Status:  Final result Specimen:  Blood from Arm, Left Updated:  09/16/20 2120     Sodium 137 mmol/L      Potassium 3 5 mmol/L      Chloride 102 mmol/L      CO2 22 mmol/L      ANION GAP 13 mmol/L      BUN 38 mg/dL      Creatinine 4 77 mg/dL      Glucose 87 mg/dL      Calcium 8 1 mg/dL      eGFR 10 ml/min/1 73sq m     Narrative:       [place] guidelines for Chronic Kidney Disease (CKD):     Stage 1 with normal or high GFR (GFR > 90 mL/min/1 73 square meters)    Stage 2 Mild CKD (GFR = 60-89 mL/min/1 73 square meters)    Stage 3A Moderate CKD (GFR = 45-59 mL/min/1 73 square meters)    Stage 3B Moderate CKD (GFR = 30-44 mL/min/1 73 square meters)    Stage 4 Severe CKD (GFR = 15-29 mL/min/1 73 square meters)    Stage 5 End Stage CKD (GFR <15 mL/min/1 73 square meters)  Note: GFR calculation is accurate only with a steady state creatinine    Magnesium [[id number]]  (Normal) Collected:  09/16/20 2010    Lab Status:  Final result Specimen:  Blood from Arm, Left Updated:  09/16/20 2120     Magnesium 2 4 mg/dL     Phosphorus [[id number]]  (Normal) Collected:  09/16/20 2010    Lab Status:  Final result Specimen:  Blood from Arm, Left Updated:  09/16/20 2120     Phosphorus 3 6 mg/dL     Fingerstick Glucose (POCT) [[id number]]  (Normal) Collected:  09/16/20 1705    Lab Status:  Final result Updated:  09/16/20 1705     POC Glucose 103 mg/dl     Urine Microscopic [[id number]]  (Abnormal) Collected:  09/16/20 1318    Lab Status:  Final result Specimen:  Urine, Indwelling Moreno Catheter Updated:  09/16/20 1452     RBC, UA None Seen /hpf      WBC, UA Innumerable /hpf      Epithelial Cells None Seen /hpf      Bacteria, UA Innumerable /hpf      OTHER OBSERVATIONS WBCs Clumped    Amylase [[id number]]  (Normal) Collected:  09/16/20 1336    Lab Status:  Final result Specimen:  Blood from Arm, Left Updated:  09/16/20 1404     Amylase 96 IU/L     Lipase [[id number]]  (Abnormal) Collected:  09/16/20 1336    Lab Status:  Final result Specimen:  Blood from Arm, Left Updated:  09/16/20 1404     Lipase 607 u/L     POCT urinalysis dipstick [[id number]]  (Normal) Resulted:  09/16/20 1321    Lab Status:  Final result Specimen:  Urine Updated:  09/16/20 1321     Color, UA see chart    Urine Macroscopic, POC [[id number]]  (Abnormal) Collected:  09/16/20 1318    Lab Status:  Final result Specimen:  Urine Updated:  09/16/20 1320     Color, UA Yellow     Clarity, UA Cloudy     pH, UA >=9 0     Leukocytes, UA Large     Nitrite, UA Negative     Protein, UA 30 (1+) mg/dl      Glucose, UA Negative mg/dl      Ketones, UA Negative mg/dl      Urobilinogen, UA 0 2 E U /dl      Bilirubin, UA Negative     Blood, UA Trace     Specific [place], UA 1 015    Narrative:       CLINITEK RESULT    Basic metabolic panel [[id number]]  (Abnormal) Collected:  09/16/20 1131    Lab Status:  Final result Specimen:  Blood from Arm, Left Updated:  09/16/20 1247     Sodium 135 mmol/L      Potassium 6 9 mmol/L      Chloride 110 mmol/L      CO2 8 mmol/L      ANION GAP 17 mmol/L       mg/dL      Creatinine 12 60 mg/dL      Glucose 145 mg/dL      Calcium 8 3 mg/dL      eGFR 3 ml/min/1 73sq m     Narrative:       National Kidney Disease Foundation guidelines for Chronic Kidney Disease (CKD):     Stage 1 with normal or high GFR (GFR > 90 mL/min/1 73 square meters)    Stage 2 Mild CKD (GFR = 60-89 mL/min/1 73 square meters)    Stage 3A Moderate CKD (GFR = 45-59 mL/min/1 73 square meters)    Stage 3B Moderate CKD (GFR = 30-44 mL/min/1 73 square meters)    Stage 4 Severe CKD (GFR = 15-29 mL/min/1 73 square meters)    Stage 5 End Stage CKD (GFR <15 mL/min/1 73 square meters)  Note: GFR calculation is accurate only with a steady state creatinine    Comprehensive metabolic panel [[id number]]  (Abnormal) Collected:  09/16/20 1009    Lab Status:  Final result Specimen:  Blood from Arm, Left Updated:  09/16/20 1117     Sodium 133 mmol/L      Potassium 6 6 mmol/L      Chloride 109 mmol/L      CO2 7 mmol/L      ANION GAP 17 mmol/L       mg/dL      Creatinine 12 90 mg/dL      Glucose 162 mg/dL      Calcium 8 7 mg/dL      Corrected Calcium 9 3 mg/dL      AST 28 U/L      ALT 35 U/L      Alkaline Phosphatase 169 U/L      Total Protein 7 6 g/dL      Albumin 3 3 g/dL      Total Bilirubin 0 75 mg/dL      eGFR 3 ml/min/1 73sq m Narrative:       National Kidney Disease Foundation guidelines for Chronic Kidney Disease (CKD):     Stage 1 with normal or high GFR (GFR > 90 mL/min/1 73 square meters)    Stage 2 Mild CKD (GFR = 60-89 mL/min/1 73 square meters)    Stage 3A Moderate CKD (GFR = 45-59 mL/min/1 73 square meters)    Stage 3B Moderate CKD (GFR = 30-44 mL/min/1 73 square meters)    Stage 4 Severe CKD (GFR = 15-29 mL/min/1 73 square meters)    Stage 5 End Stage CKD (GFR <15 mL/min/1 73 square meters)  Note: GFR calculation is accurate only with a steady state creatinine    Procalcitonin with AM Reflex [[id number]]  (Normal) Collected:  09/16/20 1030    Lab Status:  Final result Specimen:  Blood from Arm, Left Updated:  09/16/20 1114     Procalcitonin 0 18 ng/ml     APTT [[id number]]  (Normal) Collected:  09/16/20 1030    Lab Status:  Final result Specimen:  Blood from Arm, Left Updated:  09/16/20 1112     PTT 23 seconds     Protime-INR [[id number]]  (Abnormal) Collected:  09/16/20 1030    Lab Status:  Final result Specimen:  Blood from Arm, Left Updated:  09/16/20 1112     Protime 15 7 seconds      INR 1 25    TSH [[id number]]  (Normal) Collected:  09/16/20 1009    Lab Status:  Final result Specimen:  Blood from Arm, Left Updated:  09/16/20 1112     TSH 3RD GENERATON 2 120 uIU/mL     Narrative:       Patients undergoing fluorescein dye angiography may retain small amounts of fluorescein in the body for 48-72 hours post procedure  Samples containing fluorescein can produce falsely depressed TSH values  If the patient had this procedure,a specimen should be resubmitted post fluorescein clearance        Phosphorus [[id number]]  (Abnormal) Collected:  09/16/20 1009    Lab Status:  Final result Specimen:  Blood from Arm, Left Updated:  09/16/20 1106     Phosphorus 8 2 mg/dL     Magnesium [[id number]]  (Abnormal) Collected:  09/16/20 1009    Lab Status:  Final result Specimen:  Blood from Arm, Left Updated:  09/16/20 1106     Magnesium 4 1 mg/dL Lactic acid [[id number]]  (Normal) Collected:  09/16/20 1030    Lab Status:  Final result Specimen:  Blood from Arm, Left Updated:  09/16/20 1101     LACTIC ACID 1 5 mmol/L     Narrative:       Result may be elevated if tourniquet was used during collection  CBC and differential [[id number]]  (Abnormal) Collected:  09/16/20 1009    Lab Status:  Final result Specimen:  Blood from Arm, Left Updated:  09/16/20 1021     WBC 7 83 Thousand/uL      RBC 2 69 Million/uL      Hemoglobin 9 6 g/dL      Hematocrit 31 0 %       fL      MCH 35 7 pg      MCHC 31 0 g/dL      RDW 18 5 %      MPV 11 0 fL      Platelets 419 Thousands/uL      nRBC 0 /100 WBCs      Neutrophils Relative 75 %      Immat GRANS % 1 %      Lymphocytes Relative 10 %      Monocytes Relative 11 %      Eosinophils Relative 1 %      Basophils Relative 2 %      Neutrophils Absolute 5 92 Thousands/µL      Immature Grans Absolute 0 05 Thousand/uL      Lymphocytes Absolute 0 79 Thousands/µL      Monocytes Absolute 0 88 Thousand/µL      Eosinophils Absolute 0 06 Thousand/µL      Basophils Absolute 0 13 Thousands/µL                  CTA abdomen pelvis w wo contrast   Final Result by Brooklyn Boyer MD (09/20 0127)         1  Extensive atherosclerotic plaque in the infrarenal abdominal aorta, bifurcation and common iliac artery origins resulting in mild stenosis  2   Patent left pelvic renal artery without stenosis  3   Persistent left pelvic perinephric fat stranding suggesting congestive change  No obstructive uropathy  Pyelonephritis not excluded  4  Interval resolution of duodenal wall thickening and  only mild residual wall thickening in the right colon, suggesting resolving inflammatory or infectious process or vascular congestion/volume overload  Workstation performed: [id number]         CT abdomen pelvis wo contrast   Final Result by Alton Bee MD (09/18 4110)      1    There is perinephric stranding about the left lower quadrant transplant kidney concerning for pyelonephritis  2   Moreno catheter within the bladder and mild perivesicular fat stranding likely due to cystitis  3   Mild thickening of the distal esophagus may be due to esophagitis  4   Moderate thickening of the 2nd-3rd portion of the duodenum with surrounding fluid may be due to duodenitis  5   Thickening of the ascending colon with surrounding fat stranding may be due to nonspecific colitis versus reactive inflammatory change due the adjacent duodenum  6   Stable pericardial effusion  Workstation performed: [id number]         CT head without contrast   Final Result by Victor uHgo Garcia MD (09/16 1053)      No acute intracranial abnormality  Workstation performed: [id number]               Procedures  Procedures      ED Course  ED Course as of Sep 22 1444   Wed Sep 16, 2020   1027 Procedure Note: EKG  Date/Time: 09/16/20 10:10 AM   Interpreted by: Silva Valdez MD  Indications / Diagnosis: AMS  ECG reviewed by me, the ED Physician: yes   The EKG demonstrates:  Normal sinus rhythm 63 bpm  Diffuse peaked T-waves, concerning for hyperkalemia  No ST segment depressions or elevations  Peaked T-waves are new compared to ECG from 05/14/2020       1103 Increased from 3 99 three weeks ago   WBC: 7 83   1104 LACTIC ACID: 1 5   1142 Procalcitonin: 0 18   1143 Slightly hemolyzed, will recheck   Potassium(!!): 6 6   1143 CO2(!!): 7   1143 Elevated from baseline of 4-6   Creatinine(!): 12 90   1143 Magnesium(!): 4 1   1252 Potassium(!!): 6 9   1321 Color, UA: see chart   1505 Case discussed with AJAY, Dr Romeo Mckeon, and MICU, Dr Kristofer Huynh and Clarisse Jurado PA-C  Per MICU, the patient likely does not require placement in the MICU if she continues warming with lucas hugger and goes to dialysis  Dr Althea Matta, AJAY, will call back to accept the patient for admission        1516 Case discussed with Dr Juanito Brothers, who accepts the patient for admission to Step Down 2                          Initial Sepsis Screening     Row Name 09/16/20 1157                Is the patient's history suggestive of a new or worsening infection? (!) Yes (Proceed)  -CW        Suspected source of infection  suspect infection, source unknown  -CW        Are two or more of the following signs & symptoms of infection both present and new to the patient? (!) Yes (Proceed)  -CW        Indicate SIRS criteria  Hypothermia < 36C (96 8F); Altered mental status  -CW        If the answer is yes to both questions, suspicion of sepsis is present          If severe sepsis is present AND tissue hypoperfusion perists in the hour after fluid resuscitation or lactate > 4, the patient meets criteria for SEPTIC SHOCK          Are any of the following organ dysfunction criteria present within 6 hours of suspected infection and SIRS criteria that are NOT considered to be chronic conditions? (!) Yes  -CW        Organ dysfunction  Creatinine > 0 5 mg/dl ABOVE BASELINE  -CW        Date of presentation of severe sepsis  09/16/20 Does not met severe sepsis criteria at this time  -CW        Time of presentation of severe sepsis  1159 No severe sepsis criteria at this time  -CW        Tissue hypoperfusion persists in the hour after crystalloid fluid administration, evidenced, by either:          Was hypotension present within one hour of the conclusion of crystalloid fluid administration?           Date of presentation of septic shock          Time of presentation of septic shock            User Key  (r) = Recorded By, (t) = Taken By, (c) = Cosigned By    [address] Name Provider Type    Kendy Sarkar MD Resident                      MDM  Number of Diagnoses or Management Options  CKD (chronic kidney disease) requiring chronic dialysis [facility]):   Confusion:   Generalized weakness:   Hyperkalemia:   Sepsis ([address] ):   UTI (urinary tract infection):   Diagnosis management comments: 62 y/o female with PMHx of DM, MM, CKD on dialysis (T/Th/S) and prior pancreas and renal transplant who presents with confusion and generalized weakness since yesterday  She missed her Saturday dialysis due to diarrhea and her dialysis yesterday due to N/V  Per her mother, she has a history of generalized weakness in the past with dehydration  On exam, her rectal temperature on presentation was 90 4 F, BP 92/38, awake and alert but slow to respond to questions  No abdominal tenderness or CVAT on exam  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, no evidence of cellulitis  We placed her on a Lucas hugger and her most recent temp was 93 9 F  Sepsis panel ordered; WBC 7 8, Lactate 1 5, potassium 6 9, bicarb 8  BUN and Cr elevated to 116 and 12 9 (baseline Cr seems to be 4-6 most recently)  EKG shows mildly peaked T waves  Nephrology was consulted and evaluated her in the ER, they recommend sending her to dialysis  Case discussed with AJAY, Dr Keaton Jaramillo, and MICU, Dr Tiago Monteiro and Sharmaine Rhodes PA-C  Per MICU, the patient likely does not require placement in the MICU if she continues warming with lucas hugger and goes to dialysis  Case discussed with AJAY Mora, who accepts the patient for admission         Amount and/or Complexity of Data Reviewed  Clinical lab tests: reviewed  Tests in the radiology section of CPT®: reviewed  Tests in the medicine section of CPT®: reviewed  Decide to obtain previous medical records or to obtain history from someone other than the patient: yes          Disposition  Final diagnoses:   UTI (urinary tract infection)   Sepsis ([facility] [address] )   Generalized weakness   Confusion   Hyperkalemia   CKD (chronic kidney disease) requiring chronic dialysis [facility])     Time reflects when diagnosis was documented in both MDM as applicable and the Disposition within this note     Time User Action Codes Description Comment    9/16/2020  3:17 PM Emilia Summers Add [N39 0] UTI (urinary tract infection) 9/16/2020  3:17 PM Seaford Chihuahua Add [A41 9] Sepsis ([facility] )     9/16/2020  3:17 PM Seaford Chihuahua Add [R53 1] Generalized weakness     9/16/2020  3:17 PM Seaford Chihuahua Add [R41 0] Confusion     9/16/2020  3:18 PM Seaford Chihuahua Add [E87 5] Hyperkalemia     9/16/2020  3:18 PM Seaford Chihuahua Add [N18 6,  Z99 2] CKD (chronic kidney disease) requiring chronic dialysis ([place] [address] )     9/19/2020 11:55 AM Apmela Hook B Add [R11 2] Intractable nausea and vomiting     9/19/2020  7:30 PM Tayel, Hussam Add [R10 9] Abdominal pain, unspecified abdominal location     9/19/2020  7:31 PM Tayel, Hussam Add [R63 3] Feeding difficulties      9/20/2020 11:34 AM Evern Friendly Modify [N39 0] UTI (urinary tract infection)     9/20/2020 11:34 AM Evern Friendly Modify [A41 9] Sepsis ([place] [address] )     9/20/2020 11:34 AM Evern Friendly Modify [R53 1] Generalized weakness     9/20/2020 11:34 AM Evern Friendly Modify [R41 0] Confusion     9/20/2020 11:34 AM Evern Friendly Modify [E87 5] Hyperkalemia     9/20/2020 11:34 AM Evern Friendly Modify [G44 6,  Z99 2] CKD (chronic kidney disease) requiring chronic dialysis ([place] [address] )     9/20/2020 11:34 AM Evern Friendly Modify [R11 2] Intractable nausea and vomiting     9/20/2020 11:34 AM Evern Friendly Modify [R10 9] Abdominal pain, unspecified abdominal location     9/20/2020 11:34 AM Rella Daniloer L Add [R63 3] Feeding difficulties     9/20/2020 12:10 PM Tayel, Hussam Modify [N39 0] UTI (urinary tract infection)     9/20/2020 12:10 PM Tayel, Hussam Modify [A41 9] Sepsis ([facility] )     9/20/2020 12:10 PM Tayel, Hussam Modify [R53 1] Generalized weakness     9/20/2020 12:10 PM Tayel, Hussam Modify [R41 0] Confusion     9/20/2020 12:10 PM TayEla martinezsam Modify [E87 5] Hyperkalemia     9/20/2020 12:10 PM Tayel, Hussam Modify [N18 6,  Z99 2] CKD (chronic kidney disease) requiring chronic dialysis ([facility] )     9/20/2020 12:10 PM TayEla martinezsam Modify [R11 2] Intractable nausea and vomiting     9/20/2020 12:10 PM Min Simms Modify [R10 9] Abdominal pain, unspecified abdominal location     9/20/2020 12:10 PM Eileen Meeks Modify [R63 3] Feeding difficulties     9/21/2020  8:55 AM Thuan BERNARD Add [I70 90] Atherosclerotic occlusive disease       ED Disposition     ED Disposition Condition Date/Time Comment    Send to Ancillary (IR, Dialysis)  Wed Sep 16, 2020  3:41 PM Case was discussed with Dr Josh Mascorro, and the patient's admission status was agreed to be Admission Status: inpatient status to the service of Dr Nedra Francois  Follow-up Information    None         Current Discharge Medication List      CONTINUE these medications which have NOT CHANGED    Details   amLODIPine (NORVASC) 10 mg tablet Take 1 tablet (10 mg total) by mouth daily at bedtime  Qty: 30 tablet, Refills: 0    Associated Diagnoses: Essential hypertension      ARIPiprazole (ABILIFY) 30 mg tablet Take 1 tablet (30 mg total) by mouth daily at bedtime  Qty: 90 tablet, Refills: 2    Associated Diagnoses: Major depressive disorder, recurrent episode, mild (HCC)      aspirin 81 MG tablet Take 1 tablet by mouth daily      busPIRone (BUSPAR) 10 mg tablet Take 1 tablet (10 mg total) by mouth 2 (two) times a day  Qty: 180 tablet, Refills: 2    Associated Diagnoses: FELIPE (generalized anxiety disorder)      Cholecalciferol (VITAMIN D3) 1000 units CAPS Take 1 capsule by mouth daily       CRANBERRY PO Take by mouth      doxazosin (CARDURA) 2 mg tablet Take 2 tablets (4 mg total) by mouth daily at bedtime  Qty: 180 tablet, Refills: 3    Comments: **Patient requests 90 days supply**  Associated Diagnoses: Abdominal pain      DULoxetine (CYMBALTA) 60 mg delayed release capsule Take 1 capsule (60 mg total) by mouth 2 (two) times a day  Qty: 180 capsule, Refills: 2    Associated Diagnoses: FELIPE (generalized anxiety disorder);  Major depressive disorder, recurrent episode, mild (HCC)      folic acid (FOLVITE) 1 mg tablet TAKE 1 TABLET BY MOUTH EVERY DAY AS DIRECTED  Qty: 90 tablet, Refills: 3    Associated Diagnoses: Mixed hyperlipidemia      hydrALAZINE (APRESOLINE) 100 MG tablet Take 1 tablet (100 mg total) by mouth 3 (three) times a day  Qty: 270 tablet, Refills: 0    Associated Diagnoses: Essential hypertension      insulin glargine (Toujeo Max SoloStar) 300 units/mL CONCETRATED U-300 injection pen (2-unit dial) Inject 6 Units under the skin daily at bedtime      insulin lispro (HumaLOG KwikPen) 100 units/mL injection pen Use up to 20 units daily via sliding scale  Qty: 15 pen, Refills: 1    Comments: Dx: E11 65  Associated Diagnoses: Type 1 diabetes mellitus with hyperglycemia (HCC)      lenalidomide (REVLIMID) 2 5 MG CAPS Take 2 5 mg every day auth # [id number] 9/3/20  Qty: 28 capsule, Refills: 0    Associated Diagnoses: Multiple myeloma not having achieved remission (HCC)      levothyroxine 125 mcg tablet TAKE 1 TABLET BY MOUTH EVERY DAY  Qty: 90 tablet, Refills: 1    Associated Diagnoses: Type 1 diabetes mellitus with diabetic polyneuropathy ([facility] [address] );  Acquired hypothyroidism      LORazepam (ATIVAN) 2 mg tablet Take 1 tablet (2 mg total) by mouth 3 (three) times a day as needed for anxiety  Qty: 90 tablet, Refills: 4    Associated Diagnoses: FELPIE (generalized anxiety disorder)      metoprolol tartrate (LOPRESSOR) 50 mg tablet TAKE 1 TABLET TWICE DAILY  Qty: 180 tablet, Refills: 0    Associated Diagnoses: Benign essential HTN      pravastatin (PRAVACHOL) 80 mg tablet TAKE 1 TABLET BY MOUTH DAILY  Qty: 90 tablet, Refills: 5    Comments: **Patient requests 90 days supply**  Associated Diagnoses: Mixed hyperlipidemia      predniSONE 5 mg tablet TAKE 1 TABLET(5 MG TOTAL) BY MOUTH DAILY  Qty: 90 tablet, Refills: 3    Associated Diagnoses: Benign essential HTN      rOPINIRole (REQUIP) 0 25 mg tablet TAKE 1 TABLET TWICE DAILY  Qty: 180 tablet, Refills: 1    Associated Diagnoses: RLS (restless legs syndrome)      sertraline (ZOLOFT) 100 mg tablet Take 2 tablets (200 mg total) by mouth daily  Qty: 180 tablet, Refills: 2    Associated Diagnoses: FELIPE (generalized anxiety disorder); Major depressive disorder, recurrent episode, mild (HCC)      sevelamer (RENAGEL) 800 mg tablet Take 2 tablets (1,600 mg total) by mouth 3 (three) times a day with meals  Qty: 90 tablet, Refills: 1    Associated Diagnoses: Abdominal pain      sodium bicarbonate 650 mg tablet Take 3 tablets (1,950 mg total) by mouth 3 (three) times a day  Qty: 180 tablet, Refills: 0    Associated Diagnoses: Acidemia      tacrolimus (PROGRAF) 1 mg capsule TAKE 2 CAPSULES BY MOUTH EVERY TWELVE HOURS  Qty: 120 capsule, Refills: 0    Associated Diagnoses: Abdominal pain      torsemide (DEMADEX) 20 mg tablet TAKE 1 TABLET DAILY  Qty: 90 tablet, Refills: 3    Comments: **Patient requests 90 days supply**  Associated Diagnoses: Abdominal pain           No discharge procedures on file  PDMP Review       Value Time User    PDMP Reviewed  Yes 9/16/2020  4:18 PM Suma Cerda MD           ED Provider  Attending physically available and evaluated Estefany Deluca I managed the patient along with the ED Attending      Electronically Signed by         Lady Elayne MD  09/22/20 7712

## 2020-09-17 NOTE — ASSESSMENT & PLAN NOTE
Lab Results   Component Value Date    HGBA1C 5 2 08/21/2020       Recent Labs     09/17/20  0850 09/17/20  1035 09/17/20  1153 09/17/20  1503   POCGLU 104 100 97 93       Blood Sugar Average: Last 72 hrs:  (P) 93 1     On Toujeo 6 units hs and Humalog sliding scale as outpatient  Lantus 3 hs, SSI  Monitor blood sugars

## 2020-09-17 NOTE — ASSESSMENT & PLAN NOTE
· S/p renal-pancreatic transplant in 1998  · On HD since May, 2020  · On prednisone 5 mg daily, Tacrolimus 2 mg BID - continue  · Steroid given as equivalent Solumedrol for today  · F/u  Tacrolimus level - goal level 2-4

## 2020-09-17 NOTE — ASSESSMENT & PLAN NOTE
· Was on Torsemide 20 mg daily, doxazosin 4 mg at bedtime, hydralazine 100 mg 3 times daily  · Monitor blood pressures  · Avoid hypotension

## 2020-09-17 NOTE — CASE MANAGEMENT
Met with patient briefly to complete assessment and explain role of CM  Patient lives alone in a first floor  Apartment with 5 JESSI  Her father transports her to HD at [facility] for TTS 11:00 chair time  Patient denies MH and D&A treatment  PCP is Wu Thomas  The patient has prescription coverage and uses [facility]  [place]  Patient was not feeling well and asked Cm to return another time  CM reviewed d/c planning process including the following: identifying help at home, patient preference for d/c planning needs, Discharge Lounge, [facility] Meds to Bed program, availability of treatment team to discuss questions or concerns patient and/or family may have regarding understanding medications and recognizing signs and symptoms once discharged  CM also encouraged patient to follow up with all recommended appointments after discharge  Patient advised of importance for patient and family to participate in managing patients medical well being  Patient/caregiver received discharge checklist  Content reviewed  Patient/caregiver encouraged to participate in discharge plan of care prior to discharge home

## 2020-09-17 NOTE — PROGRESS NOTES
Progress Note - Bre Gomez 1965, 63 y o  female MRN: [id number]    Unit/Bed#: [facility] 531-01 Encounter: [id number]    Primary Care Provider: Gaynelle Sever, MD   Date and time admitted to hospital: 9/16/2020  9:34 AM        * UTI (urinary tract infection)  Assessment & Plan  · Possible urinary tract infection  · Empirically on IV cefepime  · Follow-up on cultures    Hyperkalemia  Assessment & Plan  · Likely from missed HD x 2  · S/p HD today  · Improved    ESRD (end stage renal disease) (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, 2017  · Ct Lanalodimide 2 5 mg daily  · Ct outpatient follow-up with primary oncologist Dr Socorro Kaplan    Hyperlipidemia  Assessment & Plan  · Ct Pravastatin 80 mg daily    FELIPE (generalized anxiety disorder)/depression  Assessment & Plan  · As needed lorazepam held due to encephalopathy  · Continue Abilify, BuSpar, sertraline       Failure of pancreas transplant  Assessment & Plan  · S/p pancreatic/kidney transplant in 1998  · Failed pancreatic transplant in 2017    Diabetes mellitus type I, controlled [facility])  Assessment & Plan  Lab Results   Component Value Date    HGBA1C 5 2 08/21/2020       Recent Labs     09/17/20  0850 09/17/20  1035 09/17/20  1153 09/17/20  1503   POCGLU 104 100 97 93       Blood Sugar Average: Last 72 hrs:  (P) 93 1     On Toujeo 6 units hs and Humalog sliding scale as outpatient  Lantus 3 hs, SSI  Monitor blood sugars      Toxic metabolic encephalopathy  Assessment & Plan  · Likely metabolic  · Improving  · Monitor closely    Hypertension  Assessment & Plan  · Was on Torsemide 20 mg daily, doxazosin 4 mg at bedtime, hydralazine 100 mg 3 times daily  · Monitor blood pressures  · Avoid hypotension      Acquired hypothyroidism  Assessment & Plan  · Ct levothyroxine 125 mcg daily    Renal transplant recipient  Assessment & Plan  · S/p renal-pancreatic transplant in   · On HD since May, 2020  · On prednisone 5 mg daily, Tacrolimus 2 mg BID - continue  · F/u  Tacrolimus level - goal level 2-4          VTE Pharmacologic Prophylaxis:   Pharmacologic: Heparin  Mechanical VTE Prophylaxis in Place: Yes    Patient Centered Rounds: I have performed bedside rounds with nursing staff today  Discussions with Specialists or Other Care Team Provider:  Nephrology    Education and Discussions with Family / Patient:  Patient, updated father questions answered    Time Spent for Care: 30 minutes  More than 50% of total time spent on counseling and coordination of care as described above  Current Length of Stay: 1 day(s)    Current Patient Status: Inpatient   Certification Statement: The patient will continue to require additional inpatient hospital stay due to as outlined    Discharge Plan:  Awaiting clinical and symptomatic improvement    Code Status: Level 1 - Full Code      Subjective:     Reports abdominal discomfort and nausea  Encouraged out of bed into chair  Reports overall improvement since yesterday  History chart labs medications reviewed    Objective:     Vitals:   Temp (24hrs), Av 8 °F (37 1 °C), Min:98 1 °F (36 7 °C), Max:99 9 °F (37 7 °C)    Temp:  [98 1 °F (36 7 °C)-99 9 °F (37 7 °C)] 98 2 °F (36 8 °C)  HR:  [] 93  Resp:  [16-22] 18  BP: (123-159)/(52-93) 158/52  SpO2:  [96 %-100 %] 99 %  Body mass index is 31 63 kg/m²  Input and Output Summary (last 24 hours):        Intake/Output Summary (Last 24 hours) at 2020 1626  Last data filed at 2020 0600  Gross per 24 hour   Intake 398 5 ml   Output 1200 ml   Net -801 5 ml       Physical Exam:     Physical Exam    Comfortably sitting up in bed  Neck supple  Lungs diminished breath sounds bilateral  Heart sounds S1-S2 noted  Abdomen soft nontender  Awake obeys simple commands  Pulses noted  No rash    Additional Data:     Labs:    Results from last 7 days   Lab Units 09/17/20  1028   WBC Thousand/uL 5 94   HEMOGLOBIN g/dL 8 5*   HEMATOCRIT % 25 6*   PLATELETS Thousands/uL 167   NEUTROS PCT % 68   LYMPHS PCT % 13*   MONOS PCT % 16*   EOS PCT % 1     Results from last 7 days   Lab Units 09/17/20  1028  09/16/20  1009   SODIUM mmol/L 133*   < > 133*   POTASSIUM mmol/L 4 1   < > 6 6*   CHLORIDE mmol/L 101   < > 109*   CO2 mmol/L 22   < > 7*   BUN mg/dL 45*   < > 116*   CREATININE mg/dL 6 53*   < > 12 90*   ANION GAP mmol/L 10   < > 17*   CALCIUM mg/dL 8 0*   < > 8 7   ALBUMIN g/dL  --   --  3 3*   TOTAL BILIRUBIN mg/dL  --   --  0 75   ALK PHOS U/L  --   --  169*   ALT U/L  --   --  35   AST U/L  --   --  28   GLUCOSE RANDOM mg/dL 96   < > 162*    < > = values in this interval not displayed  Results from last 7 days   Lab Units 09/16/20  1030   INR  1 25*     Results from last 7 days   Lab Units 09/17/20  1503 09/17/20  1153 09/17/20  1035 09/17/20  0850 09/17/20  0553 09/17/20  0402 09/17/20  0203 09/17/20  0004 09/16/20  2111 09/16/20  1705   POC GLUCOSE mg/dl 93 97 100 104 96 84 84 84 86 103         Results from last 7 days   Lab Units 09/17/20  1028 09/16/20  1030   LACTIC ACID mmol/L  --  1 5   PROCALCITONIN ng/ml 1 63* 0 18           * I Have Reviewed All Lab Data Listed Above  * Additional Pertinent Lab Tests Reviewed: All Labs Within Last 24 Hours Reviewed    Imaging:    Imaging Reports Reviewed Today Include:  Imaging studies noted  Imaging Personally Reviewed by Myself Includes:      Recent Cultures (last 7 days):     Results from last 7 days   Lab Units 09/16/20  1318 09/16/20  1030 09/16/20  1009   BLOOD CULTURE   --  No Growth at 24 hrs  No Growth at 24 hrs     URINE CULTURE  >100,000 cfu/ml Escherichia coli*  --   --        Last 24 Hours Medication List:   Current Facility-Administered Medications   Medication Dose Route Frequency Provider Last Rate    acetaminophen  650 mg Oral Q6H PRN Dequan Mcgarry MD      ARIPiprazole  30 mg Oral HS Dequan Mcgarry MD      aspirin  81 mg Oral Daily Dequan Mcgarry MD      busPIRone  10 mg Oral BID Dequan Mcgarry MD      cefepime  1,000 mg Intravenous Q24H Dequan Mcgarry MD 1,000 mg (09/17/20 1348)    doxercalciferol  1 mcg Intravenous After Dialysis Dequan Mcgarry MD      epoetin rebecca  8,000 Units Intravenous After Dialysis Dequan Mcgarry MD      folic acid  2,356 mcg Oral Daily Dequan Mcgarry MD      heparin (porcine)  5,000 Units Subcutaneous [facility] Dequan Mcgarry MD      insulin glargine  3 Units Subcutaneous HS Dequan Mcgarry MD      insulin lispro  1-5 Units Subcutaneous TID [facility] Dequan Mcgarry MD      insulin lispro  1-5 Units Subcutaneous HS Dequan Mcgarry MD      lenalidomide  2 5 mg Oral Daily Dequan Mcgarry MD      levothyroxine  125 mcg Oral Early Morning Dequan Mcgarry MD      ondansetron  4 mg Intravenous Q6H PRN Dequan Mcgarry MD      pravastatin  80 mg Oral Daily Dequan Mcgarry MD      predniSONE  5 mg Oral Daily Dequan Mcgarry MD      rOPINIRole  0 25 mg Oral BID Dequan Mcgarry MD      sertraline  200 mg Oral Daily Dequan Mcgarry MD      sevelamer  800 mg Oral TID With Meals Dequan Mcgarry MD      sodium bicarbonate  1,300 mg Oral BID after meals Dequan Mcgarry MD      tacrolimus  1 mg Oral HS Michael Hickey PA-C      [START ON 9/18/2020] tacrolimus  2 mg Oral Daily Michael Hickey PA-C          Today, Patient Was Seen By: Azul Chiu MD    ** Please Note: Dictation voice to text software may have been used in the creation of this document   **

## 2020-09-17 NOTE — ASSESSMENT & PLAN NOTE
· Was on Torsemide 20 mg daily, doxazosin 4 mg at bedtime, hydralazine 100 mg 3 times daily  · Medications held per Nephrology recommendation  · BP acceptable

## 2020-09-17 NOTE — ASSESSMENT & PLAN NOTE
· Due to above  · Supportive care  · Pureed diet with nectar thick liquids pending swallow eval   · Avoid sedatives, narcotics

## 2020-09-17 NOTE — H&P
H&P- Janny Disla 1965, 63 y o  female MRN: [id number]    Unit/Bed#: [facility] 531-01 Encounter: [id number]    Primary Care Provider: Ac Lai MD   Date and time admitted to hospital: 9/16/2020  9:34 AM        * UTI (urinary tract infection)  Assessment & Plan  · H/o recurrent UTI/pyelonephritis  · Complains of suprapubic and shad-umbilical pain  · Hypothermic at 90 4 F on arrival to the ED  · H/o pancreatic and renal transplant  · Urine ex - innumerable WBC's  · Received 1 dose of cefepime 2 gm IV in the ED  · Ct Cefepime  · Check CT abdomen/pelvis  · F/u urine and blood cultures  · Monitor temp, WBC    Metabolic acidosis  Assessment & Plan  · Bicarb - 8  · Lactic acid 1 5  · Likely from missed HD  · S/p HD  · Bicarb 1300 mg BID per renal  · Recheck bicarb   · Status discussed with Dr Juni Dove, critical care by ED - ok for admission to Step down 2    Hyperkalemia  Assessment & Plan  · Likely from missed HD x 2  · S/p HD today  · Recheck potassium  · Monitor on telemetry    Toxic metabolic encephalopathy  Assessment & Plan  · Due to above  · Supportive care  · Pureed diet with nectar thick liquids pending swallow eval   · Avoid sedatives, narcotics    Diabetes mellitus type I, controlled [facility])  Assessment & Plan  Lab Results   Component Value Date    HGBA1C 5 2 08/21/2020       Recent Labs     09/16/20  1705   POCGLU 103       Blood Sugar Average: Last 72 hrs:  (P) 103     On Toujeo 6 units hs and Humalog sliding scale as outpatient  Lantus 3 hs, SSI  Monitor blood sugars      Renal transplant recipient  Assessment & Plan  · S/p renal-pancreatic transplant in 1998  · On HD since May, 2020  · On prednisone 5 mg daily, Tacrolimus 2 mg BID - continue  · Steroid given as equivalent Solumedrol for today  · F/u  Tacrolimus level - goal level 2-4      Failure of pancreas transplant  Assessment & Plan  · S/p pancreatic/kidney transplant in 1998  · Failed pancreatic transplant in 2017    ESRD (end stage renal disease) [facility])  Assessment & Plan  · On HD on Tuesday/Thursday/Saturday  · Missed HD on Saturday(9/12) due to diarrhea and on Tuesday (9/15) as unable to go due to nausea and vomiting  · Now with metabolic acidosis, hyperkalemia, hyponatremia, hyperphosphatemia  · S/p HD  · Discussed with Dr Amanda Seay - input appreciated    Multiple myeloma not having achieved remission [facility])  Assessment & Plan  · IgG kappa MM stage III diagnosed in April 2019  · 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, 2017  · Ct Lanalodimide 2 5 mg daily  · Ct outpatient follow-up with primary oncologist Dr Gerardo Rubio    Hypertension  Yoselin Garcia  · Was on Torsemide 20 mg daily, doxazosin 4 mg at bedtime, hydralazine 100 mg 3 times daily  · Medications held per Nephrology recommendation  · BP acceptable    FELIPE (generalized anxiety disorder)/depression  Assessment & Plan  · As needed lorazepam held due to encephalopathy  · Continue Abilify, BuSpar, sertraline  · Per list/pharmacy also on Duloxetine - confirm with patient when alert if taking Sertraline and Duloxetine  Hold Duloxetine for now    Acquired hypothyroidism  Assessment & Plan  · Ct levothyroxine 125 mcg daily    Hyperlipidemia  Assessment & Plan  · Ct Pravastatin 80 mg daily        VTE Prophylaxis: Heparin    Code Status: Full code    Discussion with family:  Discussed with mother on the phone    Anticipated Length of Stay:  Patient will be admitted on an Inpatient basis with an anticipated length of stay of  greater than 2 midnights  Justification for Hospital Stay:  Urinary tract infection, metabolic acidosis, hyperkalemia    Total Time for Visit, including Counseling / Coordination of Care: 1 hour  Greater than 50% of this total time spent on direct patient counseling and coordination of care  Chief Complaint:  Confusion    History of Present Illness:     Arnie Davis is a 63 y o  female with history of type 1 diabetes, multiple myeloma, ESRD on hemodialysis, hypertension, hyperlipidemia, hypothyroidism, history of pancreatic and kidney transplant in 1998 with failed pancreatic transplant in 2017, history of recurrent urinary tract infection and pyelonephritis who presents with confusion  Patient unable to provide details of history and history was obtained partly through her mother on the phone  Patient complains of suprapubic and periumbilical pain and dysuria since a few days  No fever or chills  She usually undergoes dialysis on Tuesday, Thursday and Saturday  She had diarrhea and missed her dialysis session this Saturday  Yesterday she had nausea and vomiting and canceled her hemodialysis session  When her mother went to her apartment yesterday she noted that she had a slow response  This morning when her mother saw her in her apartment she noted that she was very confused and could not walk, hence EMS was called who brought her to the ED  No recurrence of diarrhea  No vomiting at present but complains of nausea  No cough  No shortness of breath or chest pain  She was noted to have hypothermia on arrival to the ED with a temperature of 90 4° F and she was placed on a Keya Hugger with gradual improvement  Review of Systems:    Review of Systems   Gastrointestinal: Positive for abdominal pain, diarrhea, nausea and vomiting  Psychiatric/Behavioral: Positive for confusion  All other systems reviewed and are negative        Past Medical and Surgical History:     Past Medical History:   Diagnosis Date    Abnormal liver function test     Acute kidney injury ([address] )     Acute on chronic congestive heart failure (HCC)     Allergic urticaria     Anemia     Cancer (HCC)     Multiple myeloma    Cervical dysplasia     Cholelithiasis     Chronic diastolic (congestive) heart failure ([address] ) 9/18/2017    Diabetes mellitus (HCC)     Previous, controlled with diet    Diabetes mellitus with foot ulcer ([address] )     Disease of thyroid gland     Encephalopathy  Hematuria     + leak est- secondary to UTIs/panc drainage    History of transfusion     Hyperkalemia     Hypertension     Iliotibial band syndrome     Lumbar radiculopathy     Multiple myeloma (HCC)     Multiple myeloma (HCC)     Night blindness     Nonrheumatic aortic (valve) insufficiency     Pneumonia     Renal disorder     Retinopathy     Seborrhea     [facility]     Shingles     Sinus tachycardia     B blocker - cardio echo stress test 02 normal/neg LE doppler 2/02 OK and 12/07    Status post simultaneous kidney and pancreas transplant ([facility]     Toe amputation status     Trochanteric bursitis        Past Surgical History:   Procedure Laterality Date    CATARACT EXTRACTION      CHOLECYSTECTOMY      COLONOSCOPY      two polyps in the rectum removed and biopsied diverticulosis in the sigmoid colon, external hemorrhoiods- Dr Barfield    COMBINED KIDNEY-PANCREAS TRANSPLANT N/A     CT BONE MARROW BIOPSY AND ASPIRATION  4/17/2019    CYSTOSCOPY N/A 10/13/2016    Procedure: CYSTOSCOPY, retrograde pyelogram, biopsy of ureteral polyp; Surgeon: Anika Green MD;  Location: [facility];  Service:    Melissa Rosales DILATION AND CURETTAGE OF UTERUS      ESOPHAGOGASTRODUODENOSCOPY N/A 11/20/2017    Procedure: ESOPHAGOGASTRODUODENOSCOPY (EGD); Surgeon: Lamar Curry DO;  Location: [facility]; Service: Gastroenterology    EYE SURGERY      cataracts    FOOT AMPUTATION THROUGH METATARSAL Left     FOOT SURGERY Right     excision of metatarsal heads    HALLUX VALGUS CORRECTION Right     IR TUNNELED DIALYSIS CATHETER PLACEMENT  5/5/2020    NEPHRECTOMY TRANSPLANTED ORGAN      PANCREATIC TRANSPLANT REMOVAL  1998    ID ANASTOMOSIS,AV,ANY SITE Right 7/13/2020    Procedure: Creation of right brachiocephalic fistula; Surgeon: Abdirashid Dunn MD;  Location: [facility];  Service: Vascular       Meds/Allergies:    Prior to Admission medications    Medication Sig Start Date End Date Taking?  Authorizing Provider amLODIPine (NORVASC) 10 mg tablet Take 1 tablet (10 mg total) by mouth daily at bedtime 5/17/20   Adilia Daley MD   ARIPiprazole (ABILIFY) 30 mg tablet Take 1 tablet (30 mg total) by mouth daily at bedtime 4/21/20 1/16/21  Abiola Yanes MD   aspirin 81 MG tablet Take 1 tablet by mouth daily 6/5/13   Historical Provider, MD   busPIRone (BUSPAR) 10 mg tablet Take 1 tablet (10 mg total) by mouth 2 (two) times a day 4/21/20 1/16/21  Abiola Yanes MD   Cholecalciferol (VITAMIN D3) 1000 units CAPS Take 1 capsule by mouth daily     Historical Provider, MD   CRANBERRY PO Take by mouth    Historical Provider, MD   doxazosin (CARDURA) 2 mg tablet Take 2 tablets (4 mg total) by mouth daily at bedtime 3/12/20   Claudette Frames, MD   DULoxetine (CYMBALTA) 60 mg delayed release capsule Take 1 capsule (60 mg total) by mouth 2 (two) times a day 4/21/20 1/16/21  Abiola Yanes MD   folic acid (FOLVITE) 1 mg tablet TAKE 1 TABLET BY MOUTH EVERY DAY AS DIRECTED 2/15/20   Leandro Section, MD   hydrALAZINE (APRESOLINE) 100 MG tablet Take 1 tablet (100 mg total) by mouth 3 (three) times a day 1/23/20   Donald Beverly MD   insulin glargine (Toujeo Max SoloStar) 300 units/mL CONCETRATED U-300 injection pen (2-unit dial) Inject 6 Units under the skin daily at bedtime    Historical Provider, MD   insulin lispro (HumaLOG KwikPen) 100 units/mL injection pen Use up to 20 units daily via sliding scale 8/12/20   Nya Lutz PA-C   lenalidomide (REVLIMID) 2 5 MG CAPS Take 2 5 mg every day auth # [id number] 9/3/20 9/10/20   Jenae Mayes MD   levothyroxine 125 mcg tablet TAKE 1 TABLET BY MOUTH EVERY DAY 9/8/20   Nya Lutz PA-C   LORazepam (ATIVAN) 2 mg tablet Take 1 tablet (2 mg total) by mouth 3 (three) times a day as needed for anxiety 4/21/20 9/18/20  Abiola Yanes MD   metoprolol tartrate (LOPRESSOR) 50 mg tablet TAKE 1 TABLET TWICE DAILY 3/3/20   Claudette Frames, MD   pravastatin (PRAVACHOL) 80 mg tablet TAKE 1 TABLET BY MOUTH DAILY 18   Theresa Monahan MD   predniSONE 5 mg tablet TAKE 1 TABLET(5 MG TOTAL) BY MOUTH DAILY 20   MER Yee   rOPINIRole (REQUIP) 0 25 mg tablet TAKE 1 TABLET TWICE DAILY 20   Theresa Monahan MD   sertraline (ZOLOFT) 100 mg tablet Take 2 tablets (200 mg total) by mouth daily 20  Edenilson Patel MD   sevelamer (RENAGEL) 800 mg tablet Take 2 tablets (1,600 mg total) by mouth 3 (three) times a day with meals 20   Shawna Calles MD   sodium bicarbonate 650 mg tablet Take 3 tablets (1,950 mg total) by mouth 3 (three) times a day 20   Teresita Tong MD   tacrolimus (PROGRAF) 1 mg capsule TAKE 2 CAPSULES BY MOUTH EVERY TWELVE HOURS  Patient taking differently: Taking 2 mg in the am and 1 mg in the pm 20   Shawna Calles MD   torsemide (DEMADEX) 20 mg tablet TAKE 1 TABLET DAILY 20   Theresa Monahan MD     I have reviewed home medications with a medical source (PCP, Pharmacy, other)  Allergies: Allergies   Allergen Reactions    Ixazomib Rash     Ninlaro    Revlimid [Lenalidomide] Rash    Cefadroxil      Tolerated cefepime 2019    Morphine Other (See Comments)     Other reaction(s): Other (See Comments)  projectile vomiting    Morphine And Related GI Intolerance    Myrbetriq [Mirabegron] Hives    Penicillins Hives     Other reaction(s):  Other (See Comments)  Respiratory Distress,hives  Tolerates cefazolin       Social History:     Marital Status: Legally      Substance Use History:   Social History     Substance and Sexual Activity   Alcohol Use Never    Frequency: Never    Binge frequency: Never     Social History     Tobacco Use   Smoking Status Former Smoker    Last attempt to quit: 2012    Years since quittin 3   Smokeless Tobacco Never Used     Social History     Substance and Sexual Activity   Drug Use Never    Types: Hydrocodone    Comment: Past cocaine use many years ago - no current use       Family History:    Family History   Problem Relation Age of Onset    Hypertension Mother     Cancer Mother     Hypertension Father     Cancer Father     Cancer Maternal Grandfather     Cancer Paternal Grandmother     Cancer Paternal Grandfather     Depression Sister     Breast cancer Maternal Grandmother 77       Physical Exam:     Vitals:   Blood Pressure: 144/58 (09/16/20 1951)  Pulse: (!) 106 (09/16/20 1951)  Temperature: 99 °F (37 2 °C) (09/16/20 1951)  Temp Source: Oral (09/16/20 1951)  Respirations: 22 (09/16/20 1951)  Height: 5' 7" (170 2 cm) (09/16/20 0939)  Weight - Scale: 91 6 kg (202 lb) (09/16/20 0939)  SpO2: 100 % (09/16/20 1951)    Physical Exam  HENT:      Head: Normocephalic and atraumatic  Nose: Nose normal       Mouth/Throat:      Mouth: Mucous membranes are moist    Eyes:      Extraocular Movements: Extraocular movements intact  Pupils: Pupils are equal, round, and reactive to light  Neck:      Musculoskeletal: Neck supple  Cardiovascular:      Rate and Rhythm: Normal rate and regular rhythm  Pulmonary:      Effort: Pulmonary effort is normal       Breath sounds: Normal breath sounds  Abdominal:      General: Bowel sounds are normal  There is no distension  Palpations: Abdomen is soft  Tenderness: There is no abdominal tenderness  Musculoskeletal:         General: No swelling  Skin:     General: Skin is warm and dry  Neurological:      General: No focal deficit present  Comments: Confused  Slow to respond to verbal commands         Additional Data:     Lab Results: I have personally reviewed pertinent reports        Results from last 7 days   Lab Units 09/16/20 2010 09/16/20  1009   WBC Thousand/uL  --  7 83   HEMOGLOBIN g/dL  --  9 6*   HEMATOCRIT %  --  31 0*   PLATELETS Thousands/uL 175 240   NEUTROS PCT %  --  75   LYMPHS PCT %  --  10*   MONOS PCT %  --  11   EOS PCT %  --  1     Results from last 7 days   Lab Units 09/16/20  1131 09/16/20  1009   SODIUM mmol/L 135* 133*   POTASSIUM mmol/L 6 9* 6 6*   CHLORIDE mmol/L 110* 109*   CO2 mmol/L 8* 7*   BUN mg/dL 123* 116*   CREATININE mg/dL 12 60* 12 90*   ANION GAP mmol/L 17* 17*   CALCIUM mg/dL 8 3 8 7   ALBUMIN g/dL  --  3 3*   TOTAL BILIRUBIN mg/dL  --  0 75   ALK PHOS U/L  --  169*   ALT U/L  --  35   AST U/L  --  28   GLUCOSE RANDOM mg/dL 145* 162*     Results from last 7 days   Lab Units 09/16/20  1030   INR  1 25*     Results from last 7 days   Lab Units 09/16/20  1705   POC GLUCOSE mg/dl 103         Results from last 7 days   Lab Units 09/16/20  1030   LACTIC ACID mmol/L 1 5   PROCALCITONIN ng/ml 0 18       Imaging: I have personally reviewed pertinent reports  CT head without contrast   Final Result by Kevin Pereira MD (09/16 1053)      No acute intracranial abnormality  Workstation performed: [id number]         CT abdomen pelvis wo contrast    (Results Pending)       ** Please Note: This note has been constructed using a voice recognition system   **

## 2020-09-17 NOTE — PROGRESS NOTES
NEPHROLOGY PROGRESS NOTE   Janny Disla 63 y o  female MRN: [id number]  Unit/Bed#: [facility] 531-01 Encounter: [id number]      ASSESSMENT/PLAN:  1  ESRD: on HD TTS at [facility]  S/p failed renal transplant and started on HD May 2020  · Missed 1 week of treatment prior to admission and received HD last night   · Awaiting today's labs to see if further HD needed today, otherwise can dialyze tomorrow   · At home is on tacrolimus 2mg am and 1mg pm and prednisone 5mg daily   · Goal tac level 2-4  Am tac level pending   · Discussed with Dr Isiah Hoover of transplant--no dose adjustments for now unless tac level above goal   2  Hyperkalemia: K improved from 6 9 to 3 5 post HD   · Awaiting today's labs    · Per outpatient HD med list she is supposed to take veltassa every Sunday   3  Suspected sepsis: likely urinary source  · UA: with innumerable WBC and bacteria   · Blood cultures and urine cultures pending   · On cefepime    4  Anion gap metabolic acidosis: in the setting of missing HD, diarrhea and suspected sepsis  Improved with HD   · Lactic acid 1 5  · On oral bicarb and may adjust depending on today's labs   5  Hypotension: improved with IVF   6  Anemia of CKD: hgb 9 6 yesterday    · On epogen 8000 units qHD   7  Hyponatremia: due to ESRD  Improved with HD up to 137 last night   8  Mineral Bone Disease of CKD: phos 8 2 on admission and down to 3 5 post HD  · On renagel 1 tab tid with meals-- today's phos pending   9  Multiple Myeloma: on revlimid per hematology   10  Access: R permacath  · Also has RUE AVF placed 7/13/2020 and duplex 8/31 shows that access appears to be mature but is too deep -- following up with vascular on 9/25 and may need superficialization     Plan Summary:    Labs pending and will decide on next HD depending on labs    Change tac to home dose 2mg am and 1mg pm     SUBJECTIVE:  Having some diarrhea this am but much more awake and alert today  No chest pain or shortness of breath  OBJECTIVE:  Current Weight: Weight - Scale: 91 6 kg (202 lb)  Vitals:    09/17/20 0754   BP: 150/63   Pulse: 95   Resp: 18   Temp: 98 1 °F (36 7 °C)   SpO2: 100%       Intake/Output Summary (Last 24 hours) at 9/17/2020 0913  Last data filed at 9/17/2020 0600  Gross per 24 hour   Intake 2098 5 ml   Output 1300 ml   Net 798 5 ml     General:  No acute distress  Skin:  No rash  Eyes:  Sclerae anicteric  ENT:  Moist mucous membranes  Neck:  Trachea midline with no JVD  Chest:  Clear to auscultation bilaterally  CVS:  Regular rate and rhythm  Abdomen:  Soft, nontender, nondistended  Extremities:  No edema  Neuro:  Awake and alert  Psych:  Appropriate affect    Medications:  Scheduled Meds:  Current Facility-Administered Medications   Medication Dose Route Frequency Provider Last Rate    acetaminophen  650 mg Oral Q6H PRN Rosa Isela Perry MD      ARIPiprazole  30 mg Oral HS Rosa Isela Perry MD      aspirin  81 mg Oral Daily Rosa Isela Perry MD      busPIRone  10 mg Oral BID Rosa Isela Perry MD      cefepime  1,000 mg Intravenous Q24H Rosa Isela Perry MD      dextrose  30 mL/hr Intravenous Continuous Rosa Isela Perry MD 30 mL/hr (09/16/20 2247)    doxercalciferol  1 mcg Intravenous After Dialysis Rosa Isela Perry MD      epoetin rebecca  8,000 Units Intravenous After Dialysis Rosa Isela Perry MD      folic acid  8,573 mcg Oral Daily Rosa Isela Perry MD      heparin (porcine)  5,000 Units Subcutaneous [facility] Rosa Isela Perry MD      insulin glargine  3 Units Subcutaneous HS Rosa Isela Perry MD      insulin lispro  1-5 Units Subcutaneous TID [facility] Rosa Isela Perry MD      insulin lispro  1-5 Units Subcutaneous HS Rosa Isela Perry MD      lenalidomide  2 5 mg Oral Daily Rosa Isela Perry MD      levothyroxine  125 mcg Oral Early Morning Rosa Isela Perry MD      ondansetron  4 mg Intravenous Q6H PRN Rosa Isela Perry MD      pravastatin  80 mg Oral Daily Rosa Isela Perry MD      predniSONE  5 mg Oral Daily Zain Hdez MD      rOPINIRole  0 25 mg Oral BID Zain Hdez MD      sertraline  200 mg Oral Daily Zain Hdez MD      sevelamer  800 mg Oral TID With Meals Zain Hdez MD      sodium bicarbonate  1,300 mg Oral BID after meals Zain Hdez MD      tacrolimus  2 mg Oral Q12H Jhoana Tee MD         PRN Meds:   acetaminophen    doxercalciferol    epoetin rebecca    ondansetron    Continuous Infusions:dextrose, 30 mL/hr, Last Rate: 30 mL/hr (09/16/20 2906)        Laboratory Results:  Results from last 7 days   Lab Units 09/16/20 2010 09/16/20  1131 09/16/20  1009   WBC Thousand/uL  --   --  7 83   HEMOGLOBIN g/dL  --   --  9 6*   HEMATOCRIT %  --   --  31 0*   PLATELETS Thousands/uL 175  --  240   SODIUM mmol/L 137 135* 133*   POTASSIUM mmol/L 3 5 6 9* 6 6*   CHLORIDE mmol/L 102 110* 109*   CO2 mmol/L 22 8* 7*   BUN mg/dL 38* 123* 116*   CREATININE mg/dL 4 77* 12 60* 12 90*   CALCIUM mg/dL 8 1* 8 3 8 7   MAGNESIUM mg/dL 2 4  --  4 1*   PHOSPHORUS mg/dL 3 6  --  8 2*

## 2020-09-17 NOTE — ASSESSMENT & PLAN NOTE
· IgG kappa MM stage III diagnosed in April 2019  · 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, 2017  · Ct Lanalodimide 2 5 mg daily  · Ct outpatient follow-up with primary oncologist Dr Alda Merida

## 2020-09-17 NOTE — ASSESSMENT & PLAN NOTE
· IgG kappa MM stage III diagnosed in April 2019  · 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, 2017  · Ct Lanalodimide 2 5 mg daily  · Ct outpatient follow-up with primary oncologist Dr Son Villanueva

## 2020-09-17 NOTE — ASSESSMENT & PLAN NOTE
· Bicarb - 8  · Lactic acid 1 5  · Likely from missed HD  · S/p HD  · Bicarb 1300 mg BID per renal  · Recheck bicarb   · Status discussed with Dr Maryse Tanner, critical care by ED - ok for admission to Step down 2

## 2020-09-17 NOTE — SPEECH THERAPY NOTE
Speech-Language Pathology Bedside Swallow Evaluation      Patient Name: Simi Thomas    YBOTS'Q Date: 9/17/2020     Problem List  Principal Problem:    UTI (urinary tract infection)  Active Problems:    Renal transplant recipient    Acquired hypothyroidism    Hypertension    Toxic metabolic encephalopathy    Diabetes mellitus type I, controlled ([facility] )    Failure of pancreas transplant    FELIPE (generalized anxiety disorder)/depression    Hyperlipidemia    Multiple myeloma not having achieved remission (HCC)    Metabolic acidosis    ESRD (end stage renal disease) ([facility] )    Hyperkalemia      Past Medical History  Past Medical History:   Diagnosis Date    Abnormal liver function test     Acute kidney injury ([facility] )     Acute on chronic congestive heart failure (HCC)     Allergic urticaria     Anemia     Cancer (HCC)     Multiple myeloma    Cervical dysplasia     Cholelithiasis     Chronic diastolic (congestive) heart failure ([facility] ) 9/18/2017    Diabetes mellitus (HCC)     Previous, controlled with diet    Diabetes mellitus with foot ulcer ([address] )     Disease of thyroid gland     Encephalopathy     Hematuria     + leak est- secondary to UTIs/panc drainage    History of transfusion     Hyperkalemia     Hypertension     Iliotibial band syndrome     Lumbar radiculopathy     Multiple myeloma (HCC)     Multiple myeloma (HCC)     Night blindness     Nonrheumatic aortic (valve) insufficiency     Pneumonia     Renal disorder     Retinopathy     Seborrhea     Seizure ([address] )     Shingles     Sinus tachycardia     B blocker - cardio echo stress test 02 normal/neg LE doppler 2/02 OK and 12/07    Status post simultaneous kidney and pancreas transplant ([facility] )     Toe amputation status     Trochanteric bursitis        Past Surgical History  Past Surgical History:   Procedure Laterality Date    CATARACT EXTRACTION      CHOLECYSTECTOMY      COLONOSCOPY      two polyps in the rectum removed and biopsied diverticulosis in the sigmoid colon, external hemorrhoiods- Dr Barfield    COMBINED KIDNEY-PANCREAS TRANSPLANT N/A     CT BONE MARROW BIOPSY AND ASPIRATION  4/17/2019    CYSTOSCOPY N/A 10/13/2016    Procedure: CYSTOSCOPY, retrograde pyelogram, biopsy of ureteral polyp; Surgeon: Patrick Sevilla MD;  Location: [facility];  Service:    [facility] DILATION AND CURETTAGE OF UTERUS      ESOPHAGOGASTRODUODENOSCOPY N/A 11/20/2017    Procedure: ESOPHAGOGASTRODUODENOSCOPY (EGD); Surgeon: Albin Ocasio DO;  Location: [facility]; Service: Gastroenterology    EYE SURGERY      cataracts    FOOT AMPUTATION THROUGH METATARSAL Left     FOOT SURGERY Right     excision of metatarsal heads    HALLUX VALGUS CORRECTION Right     IR TUNNELED DIALYSIS CATHETER PLACEMENT  5/5/2020    NEPHRECTOMY TRANSPLANTED ORGAN      PANCREATIC TRANSPLANT REMOVAL  1998    MO ANASTOMOSIS,AV,ANY SITE Right 7/13/2020    Procedure: Creation of right brachiocephalic fistula; Surgeon: Alistair Jacob MD;  Location: [facility];  Service: Vascular       Summary   Pt presented as alert but with slow processing/response time and with c/o dizziness and nausea (she accepted only limited amounts/materials offered this am)  She presented with functional appearing oral and pharyngeal stage swallowing skills with limited materials that she would accept this am (tsps of hot cereal, nectar thick and thin liquid)  Recommended Diet: puree/level 1 diet, thin liquids and renal   Recommended Form of Meds: whole with liquid   Precautions: upright posture, only feed when fully alert and slow rate of feeding        Current Medical Status  Alina Conroy is a 63 y o  female with history of type 1 diabetes, multiple myeloma, ESRD on hemodialysis, hypertension, hyperlipidemia, hypothyroidism, history of pancreatic and kidney transplant in 1998 with failed pancreatic transplant in 2017, history of recurrent urinary tract infection and pyelonephritis   She usually undergoes dialysis on Tuesday, Thursday and Saturday  She had diarrhea and missed her dialysis session this Saturday  Yesterday she had nausea and vomiting and canceled her hemodialysis session  When her mother went to her apartment yesterday she noted that she had a slow response  This morning when her mother saw her in her apartment she noted that she was very confused and could not walk, hence EMS was called  DX: UTI, metabolic acidosis, hyperkalemia, toxic metabolic encephalopathy, DM Type 1, renal transplant recipient, failure of pancreas transplant, ESRD, multiple myeloma, htn, hld, acquired hypothyroidism, FELIPE, hypothermia (90 4 on admit, s/p Keya Hickman)  SLP Swallowing Evaluation and conservative diet ordered at this time (renal pureed with nectar thick liquid       Current Precautions:  Fall     Allergies:  No known food allergies    Past medical history:  Please see H&P for details    Special Studies:  CT of abd/pelvis pending  CT of head of 9/16: No acute intracranial abnormality        Social/Education/Vocational Hx:  Pt lives alone; mother resides locally    Swallow Information   Current Risks for Dysphagia & Aspiration: AMS   Current Diet: puree/level 1 diet and nectar thick liquids   Baseline Diet: regular diet and thin liquids      Baseline Assessment   Behavior/Cognition: alert  Speech/Language Status: slow to process and respond to simple ?'s, no gross dysarthria  Patient Positioning: upright in bed  Pain Status/Interventions/Response to Interventions:  No report of pain,, some report of dizziness       Swallow Mechanism Exam  Facial: symmetrical  Labial: WFL  Lingual: unable to test 2/2 limited command following  Velum: symmetrical  Mandible: adequate ROM  Dentition: adequate  Vocal quality:clear/adequate   Respiratory Status: on RA    Consistencies Assessed and Performance   Consistencies Administered: thin liquids, nectar thick and puree  Materials administered included tsps of farina, 2 ozs NTL and sips of thin liquid (liqudis by straw    Oral Stage:    Bolus formation, transfer and control appeared functional     Pharyngeal Stage:   Swallow Mechanics:  Swallowing initiation appeared prompt  Laryngeal rise was palpated and judged to be within functional limits  No coughing, throat clearing, change in vocal quality or respiratory status noted today  Esophageal Concerns: c/o nausea    Summary and Recommendations (see above)    Results Reviewed with: patient and family     Continued evaluation/Treatment Recommended: yes    Patient Stated Goal: did not state this am    Dysphagia LTG  -Patient will demonstrate safe and effective oral intake (without overt s/s significant oral/pharyngeal dysphagia including s/s penetration or aspiration) for the highest appropriate diet level

## 2020-09-17 NOTE — ASSESSMENT & PLAN NOTE
Lab Results   Component Value Date    HGBA1C 5 2 08/21/2020       Recent Labs     09/16/20  1705   POCGLU 103       Blood Sugar Average: Last 72 hrs:  (P) 103     On Toujeo 6 units hs and Humalog sliding scale as outpatient  Lantus 3 hs, SSI  Monitor blood sugars

## 2020-09-17 NOTE — ASSESSMENT & PLAN NOTE
· As needed lorazepam held due to encephalopathy  · Continue Abilify, BuSpar, sertraline  · Per list/pharmacy also on Duloxetine - confirm with patient when alert if taking Sertraline and Duloxetine   Hold Duloxetine for now

## 2020-09-17 NOTE — ASSESSMENT & PLAN NOTE
· S/p renal-pancreatic transplant in 1998  · On HD since May, 2020  · On prednisone 5 mg daily, Tacrolimus 2 mg BID - continue  · F/u  Tacrolimus level - goal level 2-4

## 2020-09-18 NOTE — ASSESSMENT & PLAN NOTE
· Possible urinary tract infection  · Empirically on IV cefepime  · Follow-up on cultures  · Abdominal ultrasound concerning for pyelonephritis

## 2020-09-18 NOTE — PROGRESS NOTES
Progress Note - Kayla Alfaro 1965, 63 y o  female MRN: [id number]    Unit/Bed#: [facility] 531-01 Encounter: [id number]    Primary Care Provider: Anika Patel MD   Date and time admitted to hospital: 9/16/2020  9:34 AM        * UTI (urinary tract infection)  Assessment & Plan  · Possible urinary tract infection  · Empirically on IV cefepime  · Follow-up on cultures  · Abdominal ultrasound concerning for pyelonephritis    Hyperkalemia  Assessment & Plan  · Likely from missed HD x 2  · S/p HD today  · Improved    ESRD (end stage renal disease) ([facility] [address] )  Assessment & Plan  · On HD on Tuesday/Thursday/Saturday  · Dialysis per nephrology  · Nephrology following    Metabolic acidosis  Assessment & Plan  · Resolved  · Monitor    Multiple myeloma not having achieved remission [facility])  Assessment & Plan  · IgG kappa MM stage III diagnosed in April 2019  · 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, 2017  · Ct Lanalodimide 2 5 mg daily  · Ct outpatient follow-up with primary oncologist Dr Son Villanueva    Hyperlipidemia  Assessment & Plan  · Ct Pravastatin 80 mg daily    FELIPE (generalized anxiety disorder)/depression  Assessment & Plan  · As needed lorazepam held due to encephalopathy  · Continue Abilify, BuSpar, sertraline       Failure of pancreas transplant  Assessment & Plan  · S/p pancreatic/kidney transplant in 1998  · Failed pancreatic transplant in 2017    Diabetes mellitus type I, controlled [facility])  Assessment & Plan  Lab Results   Component Value Date    HGBA1C 5 2 08/21/2020       Recent Labs     09/17/20  2348 09/18/20  0629 09/18/20  1053 09/18/20  1646   POCGLU 89 88 86 99       Blood Sugar Average: Last 72 hrs:  (P) 93     On Toujeo 6 units hs and Humalog sliding scale as outpatient  Lantus 3 hs, SSI  Monitor blood sugars      Toxic metabolic encephalopathy  Assessment & Plan  · Likely metabolic  · Improving  · Monitor closely    Hypertension  Assessment & Plan  · Was on Torsemide 20 mg daily, doxazosin 4 mg at bedtime, hydralazine 100 mg 3 times daily  · Monitor blood pressures  · Avoid hypotension      Acquired hypothyroidism  Assessment & Plan  · Ct levothyroxine 125 mcg daily    Renal transplant recipient  Assessment & Plan  · S/p renal-pancreatic transplant in   · On HD since May, 2020  · On prednisone 5 mg daily, Tacrolimus 2 mg BID - continue  · F/u  Tacrolimus level - goal level 2-4          VTE Pharmacologic Prophylaxis:   Pharmacologic: Heparin  Mechanical VTE Prophylaxis in Place: Yes    Patient Centered Rounds: I have performed bedside rounds with nursing staff today  Discussions with Specialists or Other Care Team Provider:  Nephrology    Education and Discussions with Family / Patient:  Discussed with the patient, mother at bedside updated    Time Spent for Care: 30 minutes  More than 50% of total time spent on counseling and coordination of care as described above  Current Length of Stay: 2 day(s)    Current Patient Status: Inpatient   Certification Statement: The patient will continue to require additional inpatient hospital stay due to As outlined    Discharge Plan:  Awaiting clinical and symptomatic improvement    Code Status: Level 1 - Full Code      Subjective:     Reports nausea but improved since yesterday  Abdominal pain improved since yesterday  Tolerating p o  Sitting up in chair      Objective:     Vitals:   Temp (24hrs), Av 8 °F (37 1 °C), Min:98 1 °F (36 7 °C), Max:99 7 °F (37 6 °C)    Temp:  [98 1 °F (36 7 °C)-99 7 °F (37 6 °C)] 98 1 °F (36 7 °C)  HR:  [] 88  Resp:  [18-20] 20  BP: (165-181)/(72-77) 176/74  SpO2:  [98 %-100 %] 98 %  Body mass index is 31 63 kg/m²  Input and Output Summary (last 24 hours):        Intake/Output Summary (Last 24 hours) at 2020 1655  Last data filed at 2020 1201  Gross per 24 hour   Intake    Output 1725 ml   Net -1725 ml       Physical Exam:     Physical Exam    Comfortably sitting up in chair  Neck supple  Lungs diminished breath sounds bilaterally  Heart sounds S1-S2 noted  Abdomen soft nontender  Awake obeys simple commands  Pulses noted  No rash      Additional Data:     Labs:    Results from last 7 days   Lab Units 09/18/20  0302 09/17/20  1028   WBC Thousand/uL 7 46 5 94   HEMOGLOBIN g/dL 8 9* 8 5*   HEMATOCRIT % 27 7* 25 6*   PLATELETS Thousands/uL 172 167   NEUTROS PCT %  --  68   LYMPHS PCT %  --  13*   MONOS PCT %  --  16*   EOS PCT %  --  1     Results from last 7 days   Lab Units 09/18/20  0302   SODIUM mmol/L 133*   POTASSIUM mmol/L 4 4   CHLORIDE mmol/L 102   CO2 mmol/L 18*   BUN mg/dL 53*   CREATININE mg/dL 7 35*   ANION GAP mmol/L 13   CALCIUM mg/dL 8 4   ALBUMIN g/dL 3 0*   TOTAL BILIRUBIN mg/dL 0 68   ALK PHOS U/L 151*   ALT U/L 31   AST U/L 21   GLUCOSE RANDOM mg/dL 90     Results from last 7 days   Lab Units 09/16/20  1030   INR  1 25*     Results from last 7 days   Lab Units 09/18/20  1646 09/18/20  1053 09/18/20  0629 09/17/20  2348 09/17/20  2043 09/17/20  1801 09/17/20  1613 09/17/20  1503 09/17/20  1153 09/17/20  1035 09/17/20  0850 09/17/20  0553   POC GLUCOSE mg/dl 99 86 88 89 92 101 95 93 97 100 104 96         Results from last 7 days   Lab Units 09/18/20  0440 09/17/20  1028 09/16/20  1030   LACTIC ACID mmol/L  --   --  1 5   PROCALCITONIN ng/ml 1 28* 1 63* 0 18           * I Have Reviewed All Lab Data Listed Above  * Additional Pertinent Lab Tests Reviewed: All Labs Within Last 24 Hours Reviewed    Imaging:    Imaging Reports Reviewed Today Include:  Imaging studies noted  Imaging Personally Reviewed by Myself Includes:      Recent Cultures (last 7 days):     Results from last 7 days   Lab Units 09/16/20  1318 09/16/20  1030 09/16/20  1009   BLOOD CULTURE   --  No Growth at 48 hrs  No Growth at 48 hrs     URINE CULTURE  >100,000 cfu/ml Escherichia coli*  --   --        Last 24 Hours Medication List:   Current Facility-Administered Medications   Medication Dose Route Frequency Provider Last Rate  acetaminophen  650 mg Oral Q6H PRN Abelardo Hughes MD      ARIPiprazole  30 mg Oral HS Abelardo Hughes MD      aspirin  81 mg Oral Daily Abelardo Hughes MD      busPIRone  10 mg Oral BID Abelardo Hughes MD      cefepime  1,000 mg Intravenous Q24H Abelardo Hughes MD 1,000 mg (09/18/20 1356)    doxercalciferol  1 mcg Intravenous After Dialysis Abelardo Hughes MD      epoetin rebecca  8,000 Units Intravenous After Dialysis Abelardo Hughes MD      folic acid  2,807 mcg Oral Daily Abelardo Hughes MD      heparin (porcine)  5,000 Units Subcutaneous [facility] Abelardo Hughes MD      insulin glargine  3 Units Subcutaneous HS Abelardo Hughes MD      insulin lispro  1-5 Units Subcutaneous TID [facility] [place] Abelardo Hughes MD      insulin lispro  1-5 Units Subcutaneous HS Abelardo Hughes, MD      lenalidomide  2 5 mg Oral Daily Abelardo Hughes MD      levothyroxine  125 mcg Oral Early Morning Abelardo Hughes MD      ondansetron  4 mg Intravenous Q6H PRN Abelardo Hughes MD      pravastatin  80 mg Oral Daily Abelardo Hughes MD      predniSONE  5 mg Oral Daily Abelardo Hughes MD      rOPINIRole  0 25 mg Oral BID Abelardo Hughes MD      sertraline  200 mg Oral Daily Abelardo Hughes MD      sevelamer  800 mg Oral TID With Meals Abelardo Hughes MD      sodium bicarbonate  1,300 mg Oral BID after meals Abelardo Hughes MD      tacrolimus  1 mg Oral HS Colleen Quiñones PA-C      tacrolimus  2 mg Oral Daily Colleen Quiñones PA-C          Today, Patient Was Seen By: Alyssa Shahid MD    ** Please Note: Dictation voice to text software may have been used in the creation of this document   **

## 2020-09-18 NOTE — ASSESSMENT & PLAN NOTE
· IgG kappa MM stage III diagnosed in April 2019  · 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, 2017  · Ct Lanalodimide 2 5 mg daily  · Ct outpatient follow-up with primary oncologist Dr Amairani Peña

## 2020-09-18 NOTE — ASSESSMENT & PLAN NOTE
Lab Results   Component Value Date    HGBA1C 5 2 08/21/2020       Recent Labs     09/17/20  2348 09/18/20  0629 09/18/20  1053 09/18/20  1646   POCGLU 89 88 86 99       Blood Sugar Average: Last 72 hrs:  (P) 93     On Toujeo 6 units hs and Humalog sliding scale as outpatient  Lantus 3 hs, SSI  Monitor blood sugars

## 2020-09-18 NOTE — SPEECH THERAPY NOTE
SLP Progress Note    Patient Name: Yamilet Apodaca  YQATD'T Date: 9/18/2020     Problem List  Principal Problem:    UTI (urinary tract infection)  Active Problems:    Renal transplant recipient    Acquired hypothyroidism    Hypertension    Toxic metabolic encephalopathy    Diabetes mellitus type I, controlled ([facility] [address] )    Failure of pancreas transplant    FELIPE (generalized anxiety disorder)/depression    Hyperlipidemia    Multiple myeloma not having achieved remission (HCC)    Metabolic acidosis    ESRD (end stage renal disease) ([facility] [address] )    Hyperkalemia    Subjective:  "I still don't feel great but I know I'm better than yesterday "    Objective:  Pt was seen for diagnostic dysphagia therapy to ensure tolerance of current diet (puree) and to assess potential for safe diet upgrade  Pt was alert and positioned upright  Pt was assessed with applesauce, bite of fruit/angle food cake, toast  and thin liquid  Dentures were in place  Mastication was timely and effective  Bolus formation and transfer were effective  Swallow initiation was prompt  Laryngeal rise was good by palpation  No coughing, throat clearing, c/o stasis, multiple swallows, change in vocal quality noted c po intake today  Assessment:  Pt tolerated food and thin liquid with no s/s dysphagia or aspiration  Plan/Recommendations:  Recommend upgrade to regular textured food/thin liquid

## 2020-09-18 NOTE — PROGRESS NOTES
Follow up Consultation    Nephrology   Leda Nair 63 y o  female MRN: [id number]  Unit/Bed#: [facility] 531-01 Encounter: [id number]      Physician Requesting Consult: Raman Payne MD        ASSESSMENT/PLAN:  65-year-old female past medical history of CHF, diabetes, hypertension, failed renal transplant and now ESRD (TTS) admitted with confusion and weakness   Nephrology consulted for dialysis      · ESRD:   - gets usual dialysis [place] at [facility] [place]  - outpatient dialysis orders:  Treatment time 210 minutes, dry weight 87 kilos, F 160, blood flow 350 dialysis flow rate 600  - access:  Right IJ PermCath  - has right upper extremity AV fistula placed 07/13/2020   Has a follow-up appointment with vascular 09/25/2020 may need superficialization  - estimated dry weight:  87 kilos  - last dialysis treatment prior to admission was on 09/10/2020  - last dialysis treatment on 09/16/2020  - will hold off on dialysis treatment today as clinically she is euvolemic plus due to concern for significant drop in BUN despite low blood flows and no UF  - next planned dialysis for 09/19/2020  - check BMP, phosphorus  - place on renal diet when allowed diet order  - avoid nephrotoxins, adjust meds to appropriate GFR     · H&H/anemia:  - most recent hemoglobin at 8 9 grams/deciliter  - maintain hemoglobin 10-12 grams/deciliter  - Outpatient is on:  Epogen 8000 units with dialysis  - Currently on:    Continue Epogen  - Recommendations:  Hold off on IV iron due to concern for infection     · Acid-base electrolytes:  ? Hyponatremia:  Most recent sodium 133 mEq  ? Hyperkalemia:  Resolved most recent potassium at 4 4   ? Hyperphosphatemia:  Most recent phosphorus at 5 4 mEq  ? Metabolic acidosis:  Likely secondary to sepsis plus missing dialysis treatment   Most recent bicarb at 18, continue p o  Bicarb for now   ?  Sodium, phosphorus, potassium, acidosis to be corrected with dialysis     · Blood pressure:   ? Home medications:  Doxazosin 4 mg p o  Q h s , Norvasc 10 mg p o  Q h s , hydralazine 100 mg p o  T i d , Lopressor 50 mg p o  B i d , torsemide 20 mg p o  Q day  ? Current medications:  Not on any meds   ? Recommendations:  Hold all antihypertensives for now     · Bone mineral disease/secondary hyperparathyroidism of renal origin:   ? Outpatient is on:  Renagel 1600 mg p o  T i d  With meals, Hectorol 1 mcg with dialysis  ? Currently on:  Renagel 800 mg p o  T i d  With meals  ? Recommendations:  no changes for now   ? Follow-up intact PTH as an outpatient     · Other medical problems:  ? Diabetes:  Management primary team, on insulin  ? Multiple myeloma:  Management per primary team   Follow-up with Hematology on Revlimid  ? History of renal transplant:  Tacrolimus goal level 2-4 check tach level in a m  [place] per discussion with Dr Casey Rojas for transplant, no dose adjustments for now   Continue on tacrolimus 2 mg p o  Q a m  and 1 mg p o  Q p m  and prednisone  Prograf level from  2 5  ? Concern for SIRS/ uro Sepsis:  Management primary team   Follow-up cultures  On cefepime  Most recent CT suggestive of pyelo  ? Decreased p o  Intake:   may resume IV fluids of D5 at 30 cc/hour if no significant p o  Intake         Thanks for the consult  Will continue to follow  Please call with questions  Above-mentioned orders and Plan in terms of dialysis was discussed with the team in [address] MD, [facility], 2020, 10:46 AM        Objective :  Patient seen and examined in her room no overnight events blood pressures remain elevated still with abdominal pain T-max of 99 7°, urine output close to 1 3 L last 24 hours      PHYSICAL EXAM  BP (!) 176/72   Pulse 93   Temp 99 7 °F (37 6 °C) (Oral)   Resp 20   Ht 5' 7" (1 702 m)   Wt 91 6 kg (201 lb 15 1 oz)   LMP  (LMP Unknown)   SpO2 98%   BMI 31 63 kg/m²   Temp (24hrs), Av 8 °F (37 1 °C), Min:98 1 °F (36 7 °C), Max:99 7 °F (37 6 °C)        Intake/Output Summary (Last 24 hours) at 9/18/2020 1046  Last data filed at 9/18/2020 0635  Gross per 24 hour   Intake    Output 1325 ml   Net -1325 ml       I/O last 24 hours: In: -   Out: 1325 [Urine:1325]      Current Weight: Weight - Scale: 91 6 kg (201 lb 15 1 oz)  First Weight: Weight - Scale: 91 6 kg (202 lb)  Physical Exam  Vitals signs and nursing note reviewed  Constitutional:       General: She is not in acute distress  Appearance: She is normal weight  She is ill-appearing  She is not toxic-appearing or diaphoretic  HENT:      Head: Normocephalic and atraumatic  Mouth/Throat:      Mouth: Mucous membranes are moist       Pharynx: Oropharynx is clear  No oropharyngeal exudate  Eyes:      General: No scleral icterus  Conjunctiva/sclera: Conjunctivae normal    Neck:      Musculoskeletal: Normal range of motion and neck supple  Comments: Right IJ PermCath  Cardiovascular:      Rate and Rhythm: Normal rate  Heart sounds: No friction rub  Pulmonary:      Breath sounds: Normal breath sounds  No wheezing  Abdominal:      Palpations: Abdomen is soft  There is no mass  Tenderness: There is abdominal tenderness  Musculoskeletal:         General: Swelling present  Comments: Right AV fistula with thrill and bruit   Skin:     General: Skin is warm  Coloration: Skin is not jaundiced  Neurological:      General: No focal deficit present  Mental Status: She is alert and oriented to person, place, and time  Psychiatric:         Mood and Affect: Mood normal          Behavior: Behavior normal              Review of Systems   Constitutional: Positive for appetite change and fatigue  Negative for chills  HENT: Negative for congestion and sore throat  Respiratory: Negative for cough, shortness of breath and wheezing  Cardiovascular: Negative for leg swelling  Gastrointestinal: Positive for abdominal pain and nausea  Negative for diarrhea and vomiting     Genitourinary: Negative for dysuria  Musculoskeletal: Negative for back pain  Skin: Negative for rash and wound  Neurological: Negative for dizziness and headaches  Psychiatric/Behavioral: Negative for agitation and confusion  All other systems reviewed and are negative        Scheduled Meds:  Current Facility-Administered Medications   Medication Dose Route Frequency Provider Last Rate    acetaminophen  650 mg Oral Q6H PRN Dot Morales MD      ARIPiprazole  30 mg Oral HS Dot Morales MD      aspirin  81 mg Oral Daily Dot Morales MD      busPIRone  10 mg Oral BID Dot Morales MD      cefepime  1,000 mg Intravenous Q24H Dot Morales MD 1,000 mg (09/17/20 1348)    doxercalciferol  1 mcg Intravenous After Dialysis Dot Morales MD      epoetin rebecca  8,000 Units Intravenous After Dialysis Dot Morales MD      folic acid  1,927 mcg Oral Daily Dot Morlaes MD      heparin (porcine)  5,000 Units Subcutaneous [facility] Dot Morales MD      insulin glargine  3 Units Subcutaneous HS Dot Morales MD      insulin lispro  1-5 Units Subcutaneous TID [facility] Dot Morales MD      insulin lispro  1-5 Units Subcutaneous HS Dot Morales MD      lenalidomide  2 5 mg Oral Daily Dot Morales MD      levothyroxine  125 mcg Oral Early Morning Dot Morales MD      ondansetron  4 mg Intravenous Q6H PRN Dot Morales MD      pravastatin  80 mg Oral Daily Dot Morales MD      predniSONE  5 mg Oral Daily Dot Morales MD      rOPINIRole  0 25 mg Oral BID Dot Morales MD      sertraline  200 mg Oral Daily Dot Morales MD      sevelamer  800 mg Oral TID With Meals Dot Morales MD      sodium bicarbonate  1,300 mg Oral BID after meals Dot Morales MD      tacrolimus  1 mg Oral HS Wilda Gallardo PA-C      tacrolimus  2 mg Oral Daily Wilda Gallardo PA-C         PRN Meds:   acetaminophen    doxercalciferol    epoetin rebecca   ondansetron    Continuous Infusions:       Invasive Devices: Invasive Devices     Peripheral Intravenous Line            Peripheral IV 09/16/20 Left Forearm 2 days          Line            Hemodialysis AV Fistula 07/13/20 Right Upper arm 66 days          Hemodialysis Catheter            HD Permanent Double Catheter -- days          Drain            Urethral Catheter Temperature probe 1 day                  LABORATORY:    Results from last 7 days   Lab Units 09/18/20  0302 09/17/20  1028 09/16/20 2010 09/16/20  1131 09/16/20  1009   WBC Thousand/uL 7 46 5 94  --   --  7 83   HEMOGLOBIN g/dL 8 9* 8 5*  --   --  9 6*   HEMATOCRIT % 27 7* 25 6*  --   --  31 0*   PLATELETS Thousands/uL 172 167 175  --  240   POTASSIUM mmol/L 4 4 4 1 3 5 6 9* 6 6*   CHLORIDE mmol/L 102 101 102 110* 109*   CO2 mmol/L 18* 22 22 8* 7*   BUN mg/dL 53* 45* 38* 123* 116*   CREATININE mg/dL 7 35* 6 53* 4 77* 12 60* 12 90*   CALCIUM mg/dL 8 4 8 0* 8 1* 8 3 8 7   MAGNESIUM mg/dL  --  2 6 2 4  --  4 1*   PHOSPHORUS mg/dL  --  5 4* 3 6  --  8 2*      rest all reviewed    RADIOLOGY:  CT abdomen pelvis wo contrast   Final Result by Cecile Sandoval MD (09/18 0116)      1  There is perinephric stranding about the left lower quadrant transplant kidney concerning for pyelonephritis  2   Moreno catheter within the bladder and mild perivesicular fat stranding likely due to cystitis  3   Mild thickening of the distal esophagus may be due to esophagitis  4   Moderate thickening of the 2nd-3rd portion of the duodenum with surrounding fluid may be due to duodenitis  5   Thickening of the ascending colon with surrounding fat stranding may be due to nonspecific colitis versus reactive inflammatory change due the adjacent duodenum  6   Stable pericardial effusion  Workstation performed: [id number]         CT head without contrast   Final Result by Johanne Godwin MD (09/16 6826)      No acute intracranial abnormality  Workstation performed: [id number]           Rest all reviewed    Portions of the record may have been created with voice recognition software  Occasional wrong word or "sound a like" substitutions may have occurred due to the inherent limitations of voice recognition software  Read the chart carefully and recognize, using context, where substitutions have occurred  If you have any questions, please contact the dictating provider

## 2020-09-19 NOTE — PLAN OF CARE
Problem: OCCUPATIONAL THERAPY ADULT  Goal: Performs self-care activities at highest level of function for planned discharge setting  See evaluation for individualized goals  Description: Treatment Interventions: ADL retraining, Functional transfer training, UE strengthening/ROM, Endurance training, Cognitive reorientation, Patient/family training, Equipment evaluation/education, Fine motor coordination activities, Compensatory technique education, Energy conservation, Activityengagement          See flowsheet documentation for full assessment, interventions and recommendations  Note: Limitation: Decreased ADL status, Decreased UE strength, Decreased cognition, Decreased Safe judgement during ADL, Decreased endurance, Decreased fine motor control, Decreased high-level ADLs  Prognosis: Fair  Assessment: Pt is a 62 yo female seen for OT eval s/p adm to [facility] on 9/16/2020 w/ found by mother at home to be confused, unable to walk, and hypothermic dx'd w/ UTI  Pt  has a past medical history of Abnormal liver function test, Acute kidney injury ([address] ), Acute on chronic congestive heart failure ([address] ), Allergic urticaria, Anemia, Cancer ([address] ), Cervical dysplasia, Cholelithiasis, Chronic diastolic (congestive) heart failure ([address] ) (9/18/2017), Diabetes mellitus ([address] ), Diabetes mellitus with foot ulcer ([address] ), Disease of thyroid gland, Encephalopathy, Hematuria, History of transfusion, Hyperkalemia, Hypertension, Iliotibial band syndrome, Lumbar radiculopathy, Multiple myeloma ([address] ), Multiple myeloma ([address] ), Night blindness, Nonrheumatic aortic (valve) insufficiency, Pneumonia, Renal disorder, Retinopathy, Seborrhea, Seizure ([address] ), Shingles, Sinus tachycardia, Status post simultaneous kidney and pancreas transplant ([address] ), Toe amputation status, and Trochanteric bursitis  Pt with active OT orders and up with assistance  orders  Pt is retired and resides alone in [address] [place] [address] w/ 3STE   Pt reports she is able to live with her parents upon D/C for a few days  Pt's parents lives in a [facility] w/ 1STE w/ FFSU and access to 1/2 bath  Pt was I w/  ADLS (reports sponge bathing as of recently) and most  IADLS (family A w/ grocery shopping and dialysis transportation), drove, & required use of DME PTA, including SPC for all mobility  Pt is currently demonstrating the following occupational deficits: S UB bathing/dressing, Min A LB bathign/dressing, Min A toileting, S bed mobility, Min A STS and short distance mobility w/ RW  These deficits that are impacting pt's baseline areas of occupation are a result of the following impairments: endurance, activity tolerance, functional mobility, forward functional reach, functional mobility, balance, functional standing tolerance, unsupportive home environment, decreased I w/ ADLS/IADLS, strength, decreased safety awareness and decreased insight into deficits  The following Occupational Performance Areas to address include: grooming, bathing/shower, toilet hygiene, dressing, health maintenance, functional mobility and clothing management  Based on the aforementioned OT evaluation, functional performance deficits, and assessments, pt has been identified as a high complexity evaluation  Recommend home with family support and home OT upon D/C   Pt to continue to benefit from acute immediate OT services to address the following goals 3-5x/week to      OT Discharge Recommendation: Home with skilled therapy(Home OT w/ increased family support pending progress)  OT - OK to Discharge: Yes(when medically cleared)

## 2020-09-19 NOTE — ASSESSMENT & PLAN NOTE
· Possible urinary tract infection  · Empirically on IV cefepime  · Abdominal imaging concerning for pyelonephritis  · Urine culture sensitivity noted

## 2020-09-19 NOTE — PLAN OF CARE
Problem: Potential for Falls  Goal: Patient will remain free of falls  Description: INTERVENTIONS:  - Assess patient frequently for physical needs  -  Identify cognitive and physical deficits and behaviors that affect risk of falls    -  [place] fall precautions as indicated by assessment   - Educate patient/family on patient safety including physical limitations  - Instruct patient to call for assistance with activity based on assessment  - Modify environment to reduce risk of injury  - Consider OT/PT consult to assist with strengthening/mobility  Outcome: Progressing     Problem: METABOLIC, FLUID AND ELECTROLYTES - ADULT  Goal: Electrolytes maintained within normal limits  Description: INTERVENTIONS:  - Monitor labs and assess patient for signs and symptoms of electrolyte imbalances  - Administer electrolyte replacement as ordered  - Monitor response to electrolyte replacements, including repeat lab results as appropriate  - Instruct patient on fluid and nutrition as appropriate  Outcome: Progressing  Goal: Fluid balance maintained  Description: INTERVENTIONS:  - Monitor labs   - Monitor I/O and WT  - Instruct patient on fluid and nutrition as appropriate  - Assess for signs & symptoms of volume excess or deficit  Outcome: Progressing     Problem: Prexisting or High Potential for Compromised Skin Integrity  Goal: Skin integrity is maintained or improved  Description: INTERVENTIONS:  - Identify patients at risk for skin breakdown  - Assess and monitor skin integrity  - Assess and monitor nutrition and hydration status  - Monitor labs   - Assess for incontinence   - Turn and reposition patient  - Assist with mobility/ambulation  - Relieve pressure over bony prominences  - Avoid friction and shearing  - Provide appropriate hygiene as needed including keeping skin clean and dry  - Evaluate need for skin moisturizer/barrier cream  - Collaborate with interdisciplinary team   - Patient/family teaching  - Consider wound care consult   Outcome: Progressing     Problem: Nutrition/Hydration-ADULT  Goal: Nutrient/Hydration intake appropriate for improving, restoring or maintaining nutritional needs  Description: Monitor and assess patient's nutrition/hydration status for malnutrition  Collaborate with interdisciplinary team and initiate plan and interventions as ordered  Monitor patient's weight and dietary intake as ordered or per policy  Utilize nutrition screening tool and intervene as necessary  Determine patient's food preferences and provide high-protein, high-caloric foods as appropriate       INTERVENTIONS:  - Monitor oral intake, urinary output, labs, and treatment plans  - Assess nutrition and hydration status and recommend course of action  - Evaluate amount of meals eaten  - Assist patient with eating if necessary   - Allow adequate time for meals  - Recommend/ encourage appropriate diets, oral nutritional supplements, and vitamin/mineral supplements  - Order, calculate, and assess calorie counts as needed  - Recommend, monitor, and adjust tube feedings and TPN/PPN based on assessed needs  - Assess need for intravenous fluids  - Provide specific nutrition/hydration education as appropriate  - Include patient/family/caregiver in decisions related to nutrition  Outcome: Progressing

## 2020-09-19 NOTE — PROGRESS NOTES
Progress Note - Valeria Back 1965, 63 y o  female MRN: [id number]    Unit/Bed#: [facility] 531-01 Encounter: [id number]    Primary Care Provider: Martha Cardoso MD   Date and time admitted to hospital: 9/16/2020  9:34 AM        * UTI (urinary tract infection)  Assessment & Plan  · Possible urinary tract infection  · Empirically on IV cefepime  · Abdominal imaging concerning for pyelonephritis  · Urine culture sensitivity noted    Hyperkalemia  Assessment & Plan  · Likely from missed HD x 2  · S/p HD today  · Improved    ESRD (end stage renal disease) ([facility] [address] )  Assessment & Plan  · On HD on Tuesday/Thursday/Saturday  · Dialysis per nephrology  · Nephrology following    Metabolic acidosis  Assessment & Plan  · Resolved  · Monitor    Multiple myeloma not having achieved remission [facility])  Assessment & Plan  · IgG kappa MM stage III diagnosed in April 2019  · 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, 2017  · Ct Lanalodimide 2 5 mg daily  · Ct outpatient follow-up with primary oncologist Dr Alda Merida    Hyperlipidemia  Assessment & Plan  · Ct Pravastatin 80 mg daily    FELIPE (generalized anxiety disorder)/depression  Assessment & Plan  · As needed lorazepam held due to encephalopathy  · Continue Abilify, BuSpar, sertraline  Failure of pancreas transplant  Assessment & Plan  · S/p pancreatic/kidney transplant in 1998  · Failed pancreatic transplant in 2017    Intractable nausea and vomiting  Assessment & Plan  Continue antiemetics  Patient reports abdominal pain and intractable nausea  Poor p o   Intake  Imaging studies concerning for duodenitis esophagitis will add PPI  Discussed with Nephrology, GI consult at the request of Nephrology  GI consult    Diabetes mellitus type I, controlled [facility])  Assessment & Plan  Lab Results   Component Value Date    HGBA1C 5 2 08/21/2020       Recent Labs     09/18/20  2123 09/18/20  2202 09/19/20  0251 09/19/20  0639   POCGLU 68 136 87 102       Blood Sugar Average: Last 72 hrs:  (P) 94     On Toujeo 6 units hs and Humalog sliding scale as outpatient  Lantus 3 hs, SSI  Monitor blood sugars      Toxic metabolic encephalopathy  Assessment & Plan  · Likely metabolic  · Improving  · Monitor closely    Hypertension  Assessment & Plan  · Was on Torsemide 20 mg daily, doxazosin 4 mg at bedtime, hydralazine 100 mg 3 times daily  · Monitor blood pressures  · Avoid hypotension      Acquired hypothyroidism  Assessment & Plan  · Ct levothyroxine 125 mcg daily    Renal transplant recipient  Assessment & Plan  · S/p renal-pancreatic transplant in   · On HD since May, 2020  · On prednisone 5 mg daily, Tacrolimus 2 mg BID - continue  · F/u  Tacrolimus level - goal level 2-4        VTE Pharmacologic Prophylaxis:   Pharmacologic: Heparin  Mechanical VTE Prophylaxis in Place: Yes    Patient Centered Rounds: I have performed bedside rounds with nursing staff today  Discussions with Specialists or Other Care Team Provider:  Nephrology, GI    Education and Discussions with Family / Patient:  Patient, updated mother Rivka    Time Spent for Care: 30 minutes  More than 50% of total time spent on counseling and coordination of care as described above  Current Length of Stay: 3 day(s)    Current Patient Status: Inpatient   Certification Statement: The patient will continue to require additional inpatient hospital stay due to as outlined     Discharge Plan: awaiting clinical and symptomatic improvement    Code Status: Level 1 - Full Code      Subjective:     Comfortably lying in bed  Reports ongoing nausea vomiting abdominal pain  Poor p o  Intake        Objective:     Vitals:   Temp (24hrs), Av 3 °F (36 8 °C), Min:97 4 °F (36 3 °C), Max:99 2 °F (37 3 °C)    Temp:  [97 4 °F (36 3 °C)-99 2 °F (37 3 °C)] 98 4 °F (36 9 °C)  HR:  [] 97  Resp:  [16-22] 18  BP: (130-176)/(52-77) 148/67  SpO2:  [94 %-100 %] 100 %  Body mass index is 31 63 kg/m²       Input and Output Summary (last 24 hours): Intake/Output Summary (Last 24 hours) at 9/19/2020 1239  Last data filed at 9/19/2020 0930  Gross per 24 hour   Intake 540 ml   Output 1050 ml   Net -510 ml       Physical Exam:     Physical Exam    Comfortably sitting up in bed  Neck supple  Lungs diminished breath sounds bilaterally  Heart sounds S1-S2 noted  Abdomen soft nontender  Awake obeys simple commands  Pulses noted  No rash      Additional Data:     Labs:    Results from last 7 days   Lab Units 09/18/20  0302 09/17/20  1028   WBC Thousand/uL 7 46 5 94   HEMOGLOBIN g/dL 8 9* 8 5*   HEMATOCRIT % 27 7* 25 6*   PLATELETS Thousands/uL 172 167   NEUTROS PCT %  --  68   LYMPHS PCT %  --  13*   MONOS PCT %  --  16*   EOS PCT %  --  1     Results from last 7 days   Lab Units 09/19/20  0928 09/18/20  0302   SODIUM mmol/L 133* 133*   POTASSIUM mmol/L 4 5 4 4   CHLORIDE mmol/L 105 102   CO2 mmol/L 16* 18*   BUN mg/dL 65* 53*   CREATININE mg/dL 8 25* 7 35*   ANION GAP mmol/L 12 13   CALCIUM mg/dL 7 7* 8 4   ALBUMIN g/dL  --  3 0*   TOTAL BILIRUBIN mg/dL  --  0 68   ALK PHOS U/L  --  151*   ALT U/L  --  31   AST U/L  --  21   GLUCOSE RANDOM mg/dL 111 90     Results from last 7 days   Lab Units 09/16/20  1030   INR  1 25*     Results from last 7 days   Lab Units 09/19/20  0639 09/19/20  0251 09/18/20  2202 09/18/20  2123 09/18/20  1646 09/18/20  1053 09/18/20  0629 09/17/20  2348 09/17/20  2043 09/17/20  1801 09/17/20  1613 09/17/20  1503   POC GLUCOSE mg/dl 102 87 136 68 99 86 88 89 92 101 95 93         Results from last 7 days   Lab Units 09/18/20  0440 09/17/20  1028 09/16/20  1030   LACTIC ACID mmol/L  --   --  1 5   PROCALCITONIN ng/ml 1 28* 1 63* 0 18           * I Have Reviewed All Lab Data Listed Above  * Additional Pertinent Lab Tests Reviewed:  All Labs Within Last 24 Hours Reviewed    Imaging:    Imaging Reports Reviewed Today Include:  Imaging studies noted  Imaging Personally Reviewed by Myself Includes:     Recent Cultures (last 7 days):     Results from last 7 days   Lab Units 09/16/20  1318 09/16/20  1030 09/16/20  1009   BLOOD CULTURE   --  No Growth at 48 hrs  No Growth at 48 hrs     URINE CULTURE  >100,000 cfu/ml Escherichia coli*  --   --        Last 24 Hours Medication List:   Current Facility-Administered Medications   Medication Dose Route Frequency Provider Last Rate    acetaminophen  650 mg Oral Q6H PRN Astrid Gresham MD      ARIPiprazole  30 mg Oral HS Astrid Gresham MD      aspirin  81 mg Oral Daily Astrid Gresham MD      busPIRone  10 mg Oral BID Astrid Gresham MD      cefepime  1,000 mg Intravenous Q24H Astrid Gresham MD 1,000 mg (09/18/20 1356)    dextrose  30 mL/hr Intravenous Continuous Yoel Kirby MD      doxercalciferol  1 mcg Intravenous After Dialysis Astrid Gresham MD      epoetin rebecca  8,000 Units Intravenous After Dialysis Astrid Gresham MD      folic acid  1,092 mcg Oral Daily Astrid Gresham MD      heparin (porcine)  5,000 Units Subcutaneous [facility] Astrid Gresham MD      insulin glargine  3 Units Subcutaneous HS Astrid Gresham MD      insulin lispro  1-5 Units Subcutaneous TID [facility] Astrid Gresham MD      insulin lispro  1-5 Units Subcutaneous HS Astrid Gresham MD      lenalidomide  2 5 mg Oral Daily Astrid Gresham MD      levothyroxine  125 mcg Oral Early Morning Astrid Gresham MD      ondansetron  4 mg Intravenous Q6H PRN Astrid Gresham MD      oxyCODONE  2 5 mg Oral Q6H PRN Graciela Azevedo MD      pantoprazole  40 mg Oral Early Morning Graciela Azevedo MD      pravastatin  80 mg Oral Daily Astrid Gresham MD      predniSONE  5 mg Oral Daily Astrid Gresham MD      rOPINIRole  0 25 mg Oral BID Astrid Gresham MD      sertraline  200 mg Oral Daily Astrid Gresham MD      sevelamer  800 mg Oral TID With Meals Astrid Gresham MD      sodium bicarbonate  1,300 mg Oral BID after meals Astrid Gresham MD      tacrolimus  1 mg Oral HS Rivka Lopez Jordin Joyce PA-C      tacrolimus  2 mg Oral Daily Yoahna Mariee PA-C          Today, Patient Was Seen By: Regulo Aguirre MD    ** Please Note: Dictation voice to text software may have been used in the creation of this document   **

## 2020-09-19 NOTE — OCCUPATIONAL THERAPY NOTE
Occupational Therapy Evaluation      Nobie Duet    9/19/2020    Principal Problem:    UTI (urinary tract infection)  Active Problems:    Renal transplant recipient    Acquired hypothyroidism    Hypertension    Toxic metabolic encephalopathy    Diabetes mellitus type I, controlled ([facility] [address] )    Failure of pancreas transplant    FELIPE (generalized anxiety disorder)/depression    Hyperlipidemia    Multiple myeloma not having achieved remission (HCC)    Metabolic acidosis    ESRD (end stage renal disease) ([facility] [address] )    Hyperkalemia      Past Medical History:   Diagnosis Date    Abnormal liver function test     Acute kidney injury ([facility] [address] )     Acute on chronic congestive heart failure (HCC)     Allergic urticaria     Anemia     Cancer (HCC)     Multiple myeloma    Cervical dysplasia     Cholelithiasis     Chronic diastolic (congestive) heart failure ([facility] [address] ) 9/18/2017    Diabetes mellitus (HCC)     Previous, controlled with diet    Diabetes mellitus with foot ulcer ([facility] [address] )     Disease of thyroid gland     Encephalopathy     Hematuria     + leak est- secondary to UTIs/panc drainage    History of transfusion     Hyperkalemia     Hypertension     Iliotibial band syndrome     Lumbar radiculopathy     Multiple myeloma (HCC)     Multiple myeloma (HCC)     Night blindness     Nonrheumatic aortic (valve) insufficiency     Pneumonia     Renal disorder     Retinopathy     Seborrhea     Seizure ([facility] [address] )     Shingles     Sinus tachycardia     B blocker - cardio echo stress test 02 normal/neg LE doppler 2/02 OK and 12/07    Status post simultaneous kidney and pancreas transplant ([facility] [address] )     Toe amputation status     Trochanteric bursitis        Past Surgical History:   Procedure Laterality Date    CATARACT EXTRACTION      CHOLECYSTECTOMY      COLONOSCOPY      two polyps in the rectum removed and biopsied diverticulosis in the sigmoid colon, external hemorrhoiods- Dr Barfield    COMBINED KIDNEY-PANCREAS TRANSPLANT N/A     CT BONE MARROW BIOPSY AND ASPIRATION  4/17/2019    CYSTOSCOPY N/A 10/13/2016    Procedure: CYSTOSCOPY, retrograde pyelogram, biopsy of ureteral polyp; Surgeon: Wilfredo Martinez MD;  [facility];  Service:    Sis Lewis DILATION AND CURETTAGE OF UTERUS      ESOPHAGOGASTRODUODENOSCOPY N/A 11/20/2017    Procedure: ESOPHAGOGASTRODUODENOSCOPY (EGD); Surgeon: Han Ennis DO;  Location: [facility]; Service: Gastroenterology    EYE SURGERY      cataracts    FOOT AMPUTATION THROUGH METATARSAL Left     FOOT SURGERY Right     excision of metatarsal heads    HALLUX VALGUS CORRECTION Right     IR TUNNELED DIALYSIS CATHETER PLACEMENT  5/5/2020    NEPHRECTOMY TRANSPLANTED ORGAN      PANCREATIC TRANSPLANT REMOVAL  1998    NC ANASTOMOSIS,AV,ANY SITE Right 7/13/2020    Procedure: Creation of right brachiocephalic fistula; Surgeon: Sharlene Dunn MD;  [facility];  Service: Vascular        09/19/20 0850   OT Last Visit   OT Visit Date 09/19/20   Note Type   Note type Eval/Treat   Restrictions/Precautions   Weight Bearing Precautions Per Order No   Braces or Orthoses Other (Comment)  (R shoe orthotics 2' old TMA)   Other Precautions Contact/isolation;Cognitive; Bed Alarm; Chair Alarm;Multiple lines; Fall Risk;Pain   Pain Assessment   Pain Assessment Tool Pain Assessment not indicated - pt denies pain   Pain Score No Pain   Home Living   Type of [address] One level;Performs ADLs on one level;Stairs to enter with rails  (1st flr apt; 3STE)   Bathroom Shower/Tub Tub/shower unit   Bathroom Toilet Standard   Bathroom Equipment Shower chair   Bathroom Accessibility Accessible   Home Equipment Walker;Cane   Additional Comments Pt reports primary use of SPC for all mobility PTA   Prior Function   Level of [place] Independent with ADLs and functional mobility; Needs assistance with IADLs   Lives With Alone   Receives Help From Family   ADL Assistance Independent   IADLs Needs assistance   Falls in the last 6 months 0   Vocational Retired   Lifestyle   Autonomy Pt reports being IND w/ all ADLS (has been sponge bathing recently 2' difficulty accessing tub) and most IADLS; mother and father A w/ grocery shopping and transportation to/from dialysis; (+) drives PTA   Reciprocal Relationships Pt lives alone  Pt reports having supportive parents that can provide A as needed  Pt reports she will be able to stay with her parents upon D/C - Parents live in [facility] w/ 1STE w/ FFSU and access to 1/2 bath  Unclear how much physical assist her parents will be able to provide 2' age  Service to Others Pt is retired   Intrinsic Gratification Pt reports enjoying being home  Psychosocial   Psychosocial (WDL) X   Patient Behaviors/Mood Anxious; Flat affect   ADL   Where Assessed Edge of bed   Eating Assistance 5  Supervision/Setup   Grooming Assistance 5  Supervision/Setup   UB Bathing Assistance 5  Supervision/Setup   LB Bathing Assistance 4  Minimal Assistance   [facility]  4  Minimal Assistance   Bed Mobility   Supine to Sit 5  Supervision   Additional items Assist x 1; Increased time required;Verbal cues; Bedrails;HOB elevated   Sit to Supine 5  Supervision   Additional items Assist x 1; Increased time required;Verbal cues; Bedrails   Additional Comments Pt laying supine in bed upon OT arrival  Pt returned laying supine in bed w/ all needs in reach and bed alarm activated  Transfers   Sit to Stand 4  Minimal assistance   Additional items Assist x 1; Increased time required;Verbal cues;Armrests   Stand to Sit 4  Minimal assistance   Additional items Assist x 1; Increased time required;Verbal cues;Armrests   Additional Comments Transfers w/ RW  VC and TC required for safety and hand placement      Functional Mobility   Functional Mobility 4  Minimal assistance   Additional Comments Pt demonstrated short distance household mobility into hallway w/ RW at [address]  Pt c/o SOB s/p mobility and stated this is not new for her  During mobility, Pt stated "I feel so off balanced " Pt required VC for safe RW management  Additional items Rolling walker   Balance   Static Sitting Fair +   Dynamic Sitting Fair   Static Standing Fair -   Dynamic Standing Poor +   Ambulatory Poor +   Activity Tolerance   Activity Tolerance Patient limited by fatigue;Treatment limited secondary to medical complications (Comment)  (nausea)   Medical Staff Made Aware    Nurse Made Aware RN cleared Pt for OT session   RUE Assessment   RUE Assessment WFL   LUE Assessment   LUE Assessment WFL   Hand Function   Gross Motor Coordination Functional   Fine Motor Coordination Functional   Sensation   Light Touch No apparent deficits   Cognition   Overall Cognitive Status WFL   Arousal/Participation Alert; Cooperative   Attention Attends with cues to redirect   Orientation Level Oriented X4   Memory Decreased recall of precautions;Decreased recall of recent events   Following Commands Follows one step commands with increased time or repetition   Comments Pt presents anxious and w/ flat affect upon OT arrival  Pt stated she was fearful of OOB mobility 2' having a catheter hinojosa and is nauseous  Pt required increased encouragement to participate in therapy for safe dispo planning  Pt at times presented slow to process and required VC for safety awareness t/o session  Pt to benefit from further cognitive assessment to A w/ safe dispo planning  Assessment   Limitation Decreased ADL status; Decreased UE strength;Decreased cognition;Decreased Safe judgement during ADL;Decreased endurance;Decreased fine motor control;Decreased high-level ADLs   Prognosis Fair   Assessment Pt is a 64 yo female seen for OT eval s/p adm to [facility] on 9/16/2020 w/ found by mother at home to be confused, unable to walk, and hypothermic dx'd w/ UTI   Pt  has a past medical history of Abnormal liver function test, Acute kidney injury ([facility] [address] ), Acute on chronic congestive heart failure ([facility] [address] ), Allergic urticaria, Anemia, Cancer ([facility] [address] ), Cervical dysplasia, Cholelithiasis, Chronic diastolic (congestive) heart failure ([facility] [address] ) (9/18/2017), Diabetes mellitus ([facility] [address] ), Diabetes mellitus with foot ulcer ([facility] [address] ), Disease of thyroid gland, Encephalopathy, Hematuria, History of transfusion, Hyperkalemia, Hypertension, Iliotibial band syndrome, Lumbar radiculopathy, Multiple myeloma ([facility] [address] ), Multiple myeloma ([facility] [address] ), Night blindness, Nonrheumatic aortic (valve) insufficiency, Pneumonia, Renal disorder, Retinopathy, Seborrhea, Seizure ([facility] [address] ), Shingles, Sinus tachycardia, Status post simultaneous kidney and pancreas transplant ([facility] [address] ), Toe amputation status, and Trochanteric bursitis  Pt with active OT orders and up with assistance  orders  Pt is retired and resides alone in [address] [place] [address] w/ 3STE  Pt reports she is able to live with her parents upon D/C for a few days  Pt's parents lives in a [place] w/ [facility]/ [facility] and access to 1/2 bath  Pt was I w/  ADLS (reports sponge bathing as of recently) and most  IADLS (family A w/ grocery shopping and dialysis transportation), drove, & required use of DME PTA, including SPC for all mobility  Pt is currently demonstrating the following occupational deficits: S UB bathing/dressing, Min A LB bathign/dressing, Min A toileting, S bed mobility, Min A STS and short distance mobility w/ RW  These deficits that are impacting pt's baseline areas of occupation are a result of the following impairments: endurance, activity tolerance, functional mobility, forward functional reach, functional mobility, balance, functional standing tolerance, unsupportive home environment, decreased I w/ ADLS/IADLS, strength, decreased safety awareness and decreased insight into deficits   The following Occupational Performance Areas to address include: grooming, bathing/shower, toilet hygiene, dressing, health maintenance, functional mobility and clothing management  Based on the aforementioned OT evaluation, functional performance deficits, and assessments, pt has been identified as a high complexity evaluation  Recommend home with family support and home OT upon D/C  Pt to continue to benefit from acute immediate OT services to address the following goals 3-5x/week to    Goals   Patient Goals To decrease nausea   LTG Time Frame 7-10   Long Term Goal #1 Refer to goals below   Plan   Treatment Interventions ADL retraining;Functional transfer training;UE strengthening/ROM; Endurance training;Cognitive reorientation;Patient/family training;Equipment evaluation/education; Fine motor coordination activities; Compensatory technique education; Energy conservation; Activityengagement   Goal Expiration Date 20   OT Frequency 3-5x/wk   Recommendation   OT Discharge Recommendation Home with skilled therapy  (Home OT w/ increased family support pending progress)   OT - OK to Discharge Yes  (when medically cleared)   Modified Chuck Scale   Modified [place] Scale 4       GOALS    1) Pt will improve activity tolerance to G for min 30 min txment sessions for increase engagement in functional tasks    2) Pt will complete UB/LB dressing/self care w/ mod I using adaptive device and DME as needed    3) Pt will complete bathing w/ Mod I w/ use of AE and DME as needed    4) Pt will complete toileting w/ mod I w/ G hygiene/thoroughness using DME as needed    5) Pt will improve functional transfers to Mod I on/off all surfaces using DME as needed w/ G balance/safety     6) Pt will improve functional mobility during ADL/IADL/leisure tasks to Mod I using DME as needed w/ G balance/safety     7) Pt will participate in simulated IADL management task to increase independence to Mod I w/ G safety and endurance    8) Pt will be attentive 100% of the time during ongoing cognitive assessment w/ G participation to assist w/ safe d/c planning/recommendations    9) Pt will demonstrate G carryover of pt/caregiver education and training as appropriate w/o cues w/ good tolerance to increase safety during functional tasks    10) Pt will demonstrate 100% carryover of energy conservation techniques t/o functional I/ADL/leisure tasks w/o cues s/p skilled education to increase endurance during functional tasks           Dolly Chance MS, OTR/L

## 2020-09-19 NOTE — CONSULTS
Consult Service: Gastroenterology      PATIENT INFORMATION      Regina Lincoln 63 y o  female MRN: [id number]  Unit/Bed#: [facility] 531-01 Encounter: [id number]  PCP: Gil Whitley MD  Date of Admission:  9/16/2020  Date of Consultation: 09/19/20  Requesting Physician: Arlyn Koyanagi, MD       Ivett Reyes  Yumi Ledezma is a 63 y o  old female with PMH of end-stage renal disease (on HD), multiple myeloma, history of renal transplant (on tacrolimus and prednisone), history of failed pancreatic transplant, diabetes mellitus (A1c 5 2%), history of remote cholecystectomy, history of severe esophagitis with ulceration who presents with confusion and weakness and is being consulted for intractable nausea, abdominal pain and abnormal CT scan showing esophageal thickening and duodenal thickening    Intractable Nausea, abdominal pain, poor p o  Intake - possibly with esophagitis, GERD, gastritis, PUD, H pylori, dyspepsia among other causes  Patient prefers have the procedure done as soon as possible as symptoms are incredibly bothersome and she cant tolerate any p o  Intake at this time  · EGD tomorrow  Keep NPO at midnight  · Continue PPI therapy    Esophageal thickening, duodenal thickening - noted on CT scan  May have reflux esophagitis esophagitis and duodenitis  Given immunocompromised state will also rule out opportunistic infections of the esophagus  Will need further investigation    · EGD as above      REASON FOR CONSULTATION      Intractable nausea, abdominal pain      HISTORY OF PRESENT ILLNESS      Regina Lincoln is a 63 y o  old female with PMH of end-stage renal disease, multiple myeloma, history of renal transplant (on tacrolimus and prednisone), history of failed pancreatic transplant, diabetes mellitus (A1c 5 2%), history of remote cholecystectomy, history of esophagitis with ulceration who presents with confusion and weakness and is being consulted for intractable nausea and abdominal pain  Patient is currently being treated for UTI given her confusion has resolved  She states since being admitted she has noticed she is extremely nauseous with inability to tolerate any p o  Intake including both solids and liquids  She has not vomited but does report significant abdominal pain with eating all types of foods mainly in the epigastric since umbilicus area  She is also having 3 loose stools per day she describes as green/brown in color  Does not use NSAIDs  No blood thinners  Previous EGD in 2017 was significant for antral gastritis  Colonoscopy in 2013 showed 2 rectal polyps that were small, less than 5 mm  I cannot find the pathology at this time however patient has states she has recommended repeat in 10 years  Vital signs currently stable  REVIEW OF SYSTEMS     A thorough 12-point review of systems has been conducted  Pertinent positives and negatives are mentioned in the history of present illness         PAST MEDICAL & SURGICAL HISTORY      Past Medical History:   Diagnosis Date    Abnormal liver function test     Acute kidney injury ([address] )     Acute on chronic congestive heart failure (HCC)     Allergic urticaria     Anemia     Cancer (HCC)     Multiple myeloma    Cervical dysplasia     Cholelithiasis     Chronic diastolic (congestive) heart failure ([address] ) 9/18/2017    Diabetes mellitus (HCC)     Previous, controlled with diet    Diabetes mellitus with foot ulcer ([address] )     Disease of thyroid gland     Encephalopathy     Hematuria     + leak est- secondary to UTIs/panc drainage    History of transfusion     Hyperkalemia     Hypertension     Iliotibial band syndrome     Lumbar radiculopathy     Multiple myeloma (HCC)     Multiple myeloma (HCC)     Night blindness     Nonrheumatic aortic (valve) insufficiency     Pneumonia     Renal disorder     Retinopathy     Seborrhea     Seizure (HCC)     Shingles     Sinus tachycardia     B blocker - cardio echo stress test 02 normal/neg LE doppler 2/02 OK and 12/07    Status post simultaneous kidney and pancreas transplant ([address] )     Toe amputation status     Trochanteric bursitis        Past Surgical History:   Procedure Laterality Date    CATARACT EXTRACTION      CHOLECYSTECTOMY      COLONOSCOPY      two polyps in the rectum removed and biopsied diverticulosis in the sigmoid colon, external hemorrhoiods- Dr Barfield    COMBINED KIDNEY-PANCREAS TRANSPLANT N/A     CT BONE MARROW BIOPSY AND ASPIRATION  4/17/2019    CYSTOSCOPY N/A 10/13/2016    Procedure: CYSTOSCOPY, retrograde pyelogram, biopsy of ureteral polyp; Surgeon: Tika Nichole MD;  Location: [facility];  Service:    Eric Guayama DILATION AND CURETTAGE OF UTERUS      ESOPHAGOGASTRODUODENOSCOPY N/A 11/20/2017    Procedure: ESOPHAGOGASTRODUODENOSCOPY (EGD); Surgeon: Elder Phan DO;  Location: [facility]; Service: Gastroenterology    EYE SURGERY      cataracts    FOOT AMPUTATION THROUGH METATARSAL Left     FOOT SURGERY Right     excision of metatarsal heads    HALLUX VALGUS CORRECTION Right     IR TUNNELED DIALYSIS CATHETER PLACEMENT  5/5/2020    NEPHRECTOMY TRANSPLANTED ORGAN      PANCREATIC TRANSPLANT REMOVAL  1998    CO ANASTOMOSIS,AV,ANY SITE Right 7/13/2020    Procedure: Creation of right brachiocephalic fistula; Surgeon: Jayesh Dunn MD;  Location: [facility];  Service: Vascular         MEDICATIONS & ALLERGIES       Medications:   Prior to Admission medications    Medication Sig Start Date End Date Taking?  Authorizing Provider   amLODIPine (NORVASC) 10 mg tablet Take 1 tablet (10 mg total) by mouth daily at bedtime 5/17/20   Adilia Daley MD   ARIPiprazole (ABILIFY) 30 mg tablet Take 1 tablet (30 mg total) by mouth daily at bedtime 4/21/20 1/16/21  Johnathon Bacon MD   aspirin 81 MG tablet Take 1 tablet by mouth daily 6/5/13   Historical Provider, MD   busPIRone (BUSPAR) 10 mg tablet Take 1 tablet (10 mg total) by mouth 2 (two) times a day 4/21/20 1/16/21  Matt Dinh MD   Cholecalciferol (VITAMIN D3) 1000 units CAPS Take 1 capsule by mouth daily     Historical Provider, MD   CRANBERRY PO Take by mouth    Historical Provider, MD   doxazosin (CARDURA) 2 mg tablet Take 2 tablets (4 mg total) by mouth daily at bedtime 3/12/20   Brigido Weiner MD   DULoxetine (CYMBALTA) 60 mg delayed release capsule Take 1 capsule (60 mg total) by mouth 2 (two) times a day 4/21/20 1/16/21  Matt Dinh MD   folic acid (FOLVITE) 1 mg tablet TAKE 1 TABLET BY MOUTH EVERY DAY AS DIRECTED 2/15/20   Mikie Whitehead MD   hydrALAZINE (APRESOLINE) 100 MG tablet Take 1 tablet (100 mg total) by mouth 3 (three) times a day 1/23/20   Suni Fair MD   insulin glargine (Toujeo Max SoloStar) 300 units/mL CONCETRATED U-300 injection pen (2-unit dial) Inject 6 Units under the skin daily at bedtime    Historical Provider, MD   insulin lispro (HumaLOG KwikPen) 100 units/mL injection pen Use up to 20 units daily via sliding scale 8/12/20   Nya Lutz PA-C   lenalidomide (REVLIMID) 2 5 MG CAPS Take 2 5 mg every day auth # [id number] 9/3/20 9/10/20   Vasquez Hill MD   levothyroxine 125 mcg tablet TAKE 1 TABLET BY MOUTH EVERY DAY 9/8/20   Nya Lutz PA-C   LORazepam (ATIVAN) 2 mg tablet Take 1 tablet (2 mg total) by mouth 3 (three) times a day as needed for anxiety 4/21/20 9/18/20  Matt Dinh MD   metoprolol tartrate (LOPRESSOR) 50 mg tablet TAKE 1 TABLET TWICE DAILY 3/3/20   Brigido Weiner MD   pravastatin (PRAVACHOL) 80 mg tablet TAKE 1 TABLET BY MOUTH DAILY 1/29/18   Mikie Whitehead MD   predniSONE 5 mg tablet TAKE 1 TABLET(5 MG TOTAL) BY MOUTH DAILY 6/26/20   MER Conn   rOPINIRole (REQUIP) 0 25 mg tablet TAKE 1 TABLET TWICE DAILY 7/27/20   Mikie Whitehead MD   sertraline (ZOLOFT) 100 mg tablet Take 2 tablets (200 mg total) by mouth daily 4/21/20 1/16/21  Matt Dinh MD   sevelamer (RENAGEL) 800 mg tablet Take 2 tablets (1,600 mg total) by mouth 3 (three) times a day with meals 20   Garett Sweet MD   sodium bicarbonate 650 mg tablet Take 3 tablets (1,950 mg total) by mouth 3 (three) times a day 20   Lige Rinne, MD   tacrolimus (PROGRAF) 1 mg capsule TAKE 2 CAPSULES BY MOUTH EVERY TWELVE HOURS  Patient taking differently: Taking 2 mg in the am and 1 mg in the pm 20   Garett Sweet MD   torsemide (DEMADEX) 20 mg tablet TAKE 1 TABLET DAILY 20   Fabio Lopez MD       Allergies: Allergies   Allergen Reactions    Ixazomib Rash     Ninlaro    Revlimid [Lenalidomide] Rash    Cefadroxil      Tolerated cefepime 2019    Morphine Other (See Comments)     Other reaction(s): Other (See Comments)  projectile vomiting    Morphine And Related GI Intolerance    Myrbetriq [Mirabegron] Hives    Penicillins Hives     Other reaction(s):  Other (See Comments)  Respiratory Distress,hives  Tolerates cefazolin         SOCIAL HISTORY      Marital Status: Legally     Substance Use History:   Social History     Substance and Sexual Activity   Alcohol Use Never    Frequency: Never    Binge frequency: Never     Social History     Tobacco Use   Smoking Status Former Smoker    Last attempt to quit: 2012    Years since quittin 4   Smokeless Tobacco Never Used     Social History     Substance and Sexual Activity   Drug Use Never    Types: Hydrocodone    Comment: Past cocaine use many years ago - no current use         FAMILY HISTORY      Non-Contributory      PHYSICAL EXAM     Vitals:   Blood Pressure: 169/75 (20 1543)  Pulse: 91 (20 1543)  Temperature: 98 6 °F (37 °C) (20 1543)  Temp Source: Oral (20 1543)  Respirations: 20 (20 1543)  Height: 5' 7" (170 2 cm) (20 1150)  Weight - Scale: 91 6 kg (201 lb 15 1 oz) (20 1148)  SpO2: 98 % (20 1543)    Physical Exam:   GENERAL: NAD  HEENT:  NC/AT, no scleral icterus  CARDIAC:  RRR, +S1/S2  PULMONARY:  CTA B/L, no wheezing/rales/rhonci, non-labored breathing  ABDOMEN:  Moderate abdominal tenderness to palpation of the epigastric and umbilicus area, +BS, no rebound/guarding/rigidity  Extremities:  No edema, cyanosis, or clubbing  NEUROLOGIC:  Alert  SKIN:  No rashes or erythema      ADDITIONAL DATA     Lab Results:     Results from last 7 days   Lab Units 09/18/20  0302 09/17/20  1028   WBC Thousand/uL 7 46 5 94   HEMOGLOBIN g/dL 8 9* 8 5*   HEMATOCRIT % 27 7* 25 6*   PLATELETS Thousands/uL 172 167   NEUTROS PCT %  --  68   LYMPHS PCT %  --  13*   MONOS PCT %  --  16*   EOS PCT %  --  1     Results from last 7 days   Lab Units 09/19/20  0928 09/18/20  0302   POTASSIUM mmol/L 4 5 4 4   CHLORIDE mmol/L 105 102   CO2 mmol/L 16* 18*   BUN mg/dL 65* 53*   CREATININE mg/dL 8 25* 7 35*   CALCIUM mg/dL 7 7* 8 4   ALK PHOS U/L  --  151*   ALT U/L  --  31   AST U/L  --  21     Results from last 7 days   Lab Units 09/16/20  1030   INR  1 25*       Imaging:    Ct Abdomen Pelvis Wo Contrast    Result Date: 9/18/2020  Narrative: CT ABDOMEN AND PELVIS WITHOUT IV CONTRAST INDICATION:   Sepsis  Central abdominal pain  Possible UTI  COMPARISON:  CT of the abdomen pelvis on May 14, 2020  TECHNIQUE:  CT examination of the abdomen and pelvis was performed without intravenous contrast   Axial, sagittal, and coronal 2D reformatted images were created from the source data and submitted for interpretation  Radiation dose length product (DLP) for this visit:  804 19 mGy-cm   This examination, like all CT scans performed in the [facility], was performed utilizing techniques to minimize radiation dose exposure, including the use of iterative  reconstruction and automated exposure control  Enteric contrast was not administered  FINDINGS: ABDOMEN LOWER CHEST:  Redemonstrated small pericardial effusion  LIVER/BILIARY TREE:  Unremarkable  GALLBLADDER:  Gallbladder is surgically absent  SPLEEN:  Unremarkable  PANCREAS:  Atrophic  ADRENAL GLANDS:  Unremarkable  KIDNEYS/URETERS:  Atrophic native kidneys  There is a small cyst in the right native kidney  There is a left lower quadrant transplant kidney which demonstrates increased perinephric stranding concerning for pyelonephritis  No significant hydronephrosis  STOMACH AND BOWEL:  Mild thickening of the distal esophagus may be due to esophagitis  There is thickening of the 2nd-3rd portion of the duodenum which may be due to duodenitis  No bowel obstruction  There is thickening of the ascending colon with surrounding fat stranding  APPENDIX:  No findings to suggest appendicitis  ABDOMINOPELVIC CAVITY:  There is trace ascites, decreased since prior exam   No free air  Redemonstrated prominent retroperitoneal lymph nodes, stable  Stable soft tissue density in the right iliac fossa which may be postsurgical  VESSELS:  Moderate atherosclerotic calcifications  PELVIS REPRODUCTIVE ORGANS:  Unremarkable for patient's age  URINARY BLADDER:  Moreno catheter within the bladder  There is mild perivesicular fat stranding  ABDOMINAL WALL/INGUINAL REGIONS:  Subcutaneous nodularity in the ventral abdominal wall likely due to injections  OSSEOUS STRUCTURES:  No acute fracture or destructive osseous lesion  Impression: 1  There is perinephric stranding about the left lower quadrant transplant kidney concerning for pyelonephritis  2   Moreno catheter within the bladder and mild perivesicular fat stranding likely due to cystitis  3   Mild thickening of the distal esophagus may be due to esophagitis  4   Moderate thickening of the 2nd-3rd portion of the duodenum with surrounding fluid may be due to duodenitis  5   Thickening of the ascending colon with surrounding fat stranding may be due to nonspecific colitis versus reactive inflammatory change due the adjacent duodenum  6   Stable pericardial effusion   Workstation performed: [id number]     Ct Head Without Contrast    Result Date: 9/16/2020  Narrative: CT BRAIN - WITHOUT CONTRAST INDICATION:   Altered mental status  COMPARISON:  Head CT from May 11, 2014  TECHNIQUE:  CT examination of the brain was performed  In addition to axial images, sagittal and coronal 2D reformatted images were created and submitted for interpretation  Radiation dose length product (DLP) for this visit:  826 58 mGy-cm   This examination, like all CT scans performed in the [facility], was performed utilizing techniques to minimize radiation dose exposure, including the use of iterative  reconstruction and automated exposure control  IMAGE QUALITY:  Diagnostic  FINDINGS: PARENCHYMA:  No intracranial mass, mass effect or midline shift  No CT signs of acute infarction  No acute parenchymal hemorrhage  Cerebellar tonsils are normal in position  VENTRICLES AND EXTRA-AXIAL SPACES:  Ventricles are small but stable in appearance  Mild prominence of the extra-axial CSF spaces at the vertex  Posterior fossa contents are unremarkable  VISUALIZED ORBITS AND PARANASAL SINUSES:  Mild bilateral proptosis  Visualized extraocular muscles are unremarkable  No retrobulbar inflammatory change  Visualized paranasal sinuses and mastoid air cells are well aerated  CALVARIUM AND EXTRACRANIAL SOFT TISSUES:  Normal      Impression: No acute intracranial abnormality  Workstation performed: [id number]     Vas Hemodialysis Access Duplex Right Upper Limb Avf    Result Date: 9/8/2020  Narrative:  [facility] REPORT CLINICAL: Indications: Patient presents to assess dialysis access created on 7/13/ 2020 for maturity  Patient reports pain and numbness of her fingers and a cramping sensation at times   Operative History: 2020-07-13 Right Brachiocephalic AVF creation [id number] Right IR tunneled dialysis catheter placement 2005-01-01 B/L great toe amputations - osteomyelitis 2497-86-96 Left pelvic kidney transplant [id number] Pancreas transplant [id number] Pancreatic transplant removal Risk Factors The patient has history of HTN, Diabetes, HLD, CKD, AFIB, PAD, LT Subclavian artery stenosis, CHF,  and previous smoking (quit >10yrs ago)  FINDINGS:  Subclavian, Right      PSV (cm/s): 398      EDV (cm/s): 0 Afferent artery, Right      PSV (cm/s): 270      EDV (cm/s): 115      Blood Flow Rate: 774      Diameter AP (mm): 4 5 AV Anastomosis, Right      PSV (cm/s): 794      EDV (cm/s): 479      Blood Flow Rate: 2147      Diameter AP (mm): 4 1 Axillary, Right      PSV (cm/s): 195      EDV (cm/s): 52      Blood Flow Rate: 2129      Diameter AP (mm): 8 4 Brachial Artery, Right      PSV (cm/s): 301      EDV (cm/s): 111      Blood Flow Rate: 921      Diameter AP (mm): 4 7      Depth (mm): 7 6 Efferent Vein, Right      PSV (cm/s): 162      EDV (cm/s): 57      Blood Flow Rate: 1003      Diameter AP (mm): 6 9 Efferent Artery, Right      PSV (cm/s): 115      EDV (cm/s): 8 Ceph Upper, Right      PSV (cm/s): 113      EDV (cm/s): 60      Blood Flow Rate: 411      Diameter AP (mm): 4 6 Ceph Mid Arm, Right      PSV (cm/s): 151      EDV (cm/s): 60      Blood Flow Rate: 735      Diameter AP (mm): 5 8 Cephalic Lower Arm, Right      PSV (cm/s): 165      EDV (cm/s): 68      Blood Flow Rate: 1129      Diameter AP (mm): 6 6    CONCLUSION:  Impression  RIGHT ARM: The dialysis AVF appears to be matured with adequate diameter, flow volumes and less than 6mm in depth throughout the arm  No evidence of inflow or outflow obstruction  No evidence of a pseudoaneurysm  No evidence of a large venous branch   No evidence of steal   SIGNATURE: Electronically Signed by: Jacquie Dueñas MD on 2020-08-31 04:51:37 PM ADDENDUM:  9/8/2020 5:39:55 PM: Addendum:  The depth of the fistula outflow vein was accidentally not included in this report Anastamosis:  3 9mm: Elbow: 3 5mm Dist upper arm: 5 9mm -6 9mm Mid upper arm: 7 5mm - >8mm Prox upper arm: 11 6mm Signed by Jacquie Dueñas MD on 2020-09-08 05:42:09 PM      EKG, Pathology, and Other Studies Reviewed on Admission:   · EKG: Reviewed      Counseling / Coordination of Care Time: 30 total mins spent n consult  Greater than 50% of total time spent on patient counseling and coordination of care     ** Please Note: This note is constructed using a voice recognition dictation system   **

## 2020-09-19 NOTE — PROGRESS NOTES
Follow up Consultation    Nephrology   Migdalia Perry 63 y o  female MRN: [id number]  Unit/Bed#: [facility] 531-01 Encounter: [id number]      Physician Requesting Consult: Jonatan Flanagan MD        ASSESSMENT/PLAN:  26-year-old female past medical history of CHF, diabetes, hypertension, failed renal transplant and now ESRD (TTS) admitted with confusion and weakness   Nephrology consulted for dialysis      · ESRD:   - gets usual dialysis TTS at [facility]  - outpatient dialysis orders:  Treatment time 210 minutes, dry weight 87 kilos, F 160, blood flow 350 dialysis flow rate 600  - access:  Right IJ PermCath  - has right upper extremity AV fistula placed 07/13/2020   Has a follow-up appointment with vascular 09/25/2020 may need superficialization  - estimated dry weight:  87 kilos  - last dialysis treatment prior to admission was on 09/10/2020  - last dialysis treatment on 09/16/2020  - last dialysis treatment on 9/19/20 next planned dialysis treatment for 09/22/2020  - check BMP, phosphorus with dialysis  - place on renal diet when allowed diet order  - avoid nephrotoxins, adjust meds to appropriate GFR     · H&H/anemia:  - most recent hemoglobin at 8 9 grams/deciliter  - maintain hemoglobin 10-12 grams/deciliter  - Outpatient is on:  Epogen 8000 units with dialysis  - Currently on:    Continue Epogen  - Recommendations:  Hold off on IV iron due to concern for infection     · Acid-base electrolytes:  ? Hyponatremia:  Most recent sodium 133 mEq  ? Hyperkalemia:  Resolved most recent potassium at 4 4   ? Hyperphosphatemia:  Most recent phosphorus at 5 4 mEq  ? Metabolic acidosis:  Likely secondary to sepsis plus missing dialysis treatment   Most recent bicarb at 18, continue p o  Bicarb for now   ?  Sodium, phosphorus, potassium, acidosis to be corrected with dialysis     · Blood pressure:   ? Home medications:  Doxazosin 4 mg p o  Q h s , Norvasc 10 mg p o  Q h s , hydralazine 100 mg p o  T i d , Lopressor 50 mg p o  B i d , torsemide 20 mg p o  Q day  ? Current medications:  Not on any meds   ? Recommendations:  Hold all antihypertensives for now     · Bone mineral disease/secondary hyperparathyroidism of renal origin:   ? Outpatient is on:  Renagel 1600 mg p o  T i d  With meals, Hectorol 1 mcg with dialysis  ? Currently on:  Renagel 800 mg p o  T i d  With meals  ? Recommendations:  no changes for now   ? Follow-up intact PTH as an outpatient     · Other medical problems:  ? Diabetes:  Management primary team, on insulin  ? Multiple myeloma:  Management per primary team   Follow-up with Hematology on Revlimid  ? History of renal transplant:  Tacrolimus goal level 2-4 check tach level in a m  [facility] per discussion with Dr Alvarado Oglesby for transplant, no dose adjustments for now   Continue on tacrolimus 2 mg p o  Q a m  and 1 mg p o  Q p m  and prednisone  Prograf level from  2 5  ? Concern for SIRS/ uro Sepsis:  Management primary team   Follow-up cultures  On cefepime  Most recent CT suggestive of pyelo  ? Decreased p o  Intake, abdominal pain:  resume IV fluids of D5 at 30 cc/hour while patient remains with decreased p o  Intake  Consider GI consult  CT abdomen concerning for nonspecific colitis     Thanks for the consult  Will continue to follow  Please call with questions  Above-mentioned orders and Plan in terms of dialysis was discussed with the team in [place] E Angelique Wilder [facility], 9:36 AM        Objective :  Patient seen and examined while on hemodialysis at 9:35 a m  Hemodynamically stable remains afebrile still complains of nausea and abdominal pain  Urine output close to 1 5 L last 24 hours  Not much p o  Intake      PHYSICAL EXAM  /66   Pulse 84   Temp (!) 97 4 °F (36 3 °C) (Oral)   Resp 18   Ht 5' 7" (1 702 m)   Wt 91 6 kg (201 lb 15 1 oz)   LMP  (LMP Unknown)   SpO2 100%   BMI 31 63 kg/m²   Temp (24hrs), Av 3 °F (36 8 °C), Min:97 4 °F (36 3 °C), Max:99 2 °F (37 3 °C)        Intake/Output Summary (Last 24 hours) at 9/19/2020 0936  Last data filed at 9/19/2020 0856  Gross per 24 hour   Intake 340 ml   Output 1450 ml   Net -1110 ml       I/O last 24 hours: In: 340 [P O :340]  Out: 1450 [Urine:1450]      Current Weight: Weight - Scale: 91 6 kg (201 lb 15 1 oz)  First Weight: Weight - Scale: 91 6 kg (202 lb)  Physical Exam  Vitals signs and nursing note reviewed  Constitutional:       General: She is not in acute distress  Appearance: She is normal weight  She is ill-appearing  She is not toxic-appearing or diaphoretic  HENT:      Head: Normocephalic and atraumatic  Mouth/Throat:      Mouth: Mucous membranes are moist       Pharynx: Oropharynx is clear  No oropharyngeal exudate  Eyes:      General: No scleral icterus  Conjunctiva/sclera: Conjunctivae normal    Neck:      Musculoskeletal: Normal range of motion and neck supple  Comments: Right IJ PermCath  Cardiovascular:      Rate and Rhythm: Normal rate  Heart sounds: No friction rub  Pulmonary:      Effort: No respiratory distress  Breath sounds: Normal breath sounds  No wheezing  Abdominal:      General: There is no distension  Palpations: There is no mass  Musculoskeletal:         General: Swelling present  Comments: Trace bilateral lower extremity edema right AV fistula with thrill and   Skin:     General: Skin is warm  Coloration: Skin is not jaundiced  Neurological:      General: No focal deficit present  Mental Status: She is alert and oriented to person, place, and time  Psychiatric:         Mood and Affect: Mood normal          Behavior: Behavior normal              Review of Systems   Constitutional: Positive for appetite change and fatigue  Negative for chills  HENT: Negative for congestion  Respiratory: Negative for cough, shortness of breath and wheezing  Cardiovascular: Negative for leg swelling     Gastrointestinal: Positive for abdominal pain and nausea  Negative for constipation, diarrhea and vomiting  Genitourinary: Negative for dysuria  Musculoskeletal: Negative for back pain  Neurological: Negative for dizziness and headaches  Psychiatric/Behavioral: Negative for agitation and confusion  All other systems reviewed and are negative        Scheduled Meds:  Current Facility-Administered Medications   Medication Dose Route Frequency Provider Last Rate    acetaminophen  650 mg Oral Q6H PRN Catie Ross MD      ARIPiprazole  30 mg Oral HS Catie Ross MD      aspirin  81 mg Oral Daily Catie Ross MD      busPIRone  10 mg Oral BID Catie Ross MD      cefepime  1,000 mg Intravenous Q24H Catie Ross MD 1,000 mg (09/18/20 7876)    doxercalciferol  1 mcg Intravenous After Dialysis Catie Ross MD      epoetin rebecca  8,000 Units Intravenous After Dialysis Catie Ross MD      folic acid  3,604 mcg Oral Daily Catie Ross MD      heparin (porcine)  5,000 Units Subcutaneous [facility] Catie Ross MD      insulin glargine  3 Units Subcutaneous HS Catie Ross MD      insulin lispro  1-5 Units Subcutaneous TID [facility] [place] Catie Ross MD      insulin lispro  1-5 Units Subcutaneous HS Catie Ross MD      lenalidomide  2 5 mg Oral Daily Catie Ross MD      levothyroxine  125 mcg Oral Early Morning Catie Ross MD      ondansetron  4 mg Intravenous Q6H PRN Catie Ross MD      oxyCODONE  2 5 mg Oral Q6H PRN Yousuf Muñoz MD      pravastatin  80 mg Oral Daily Catie Ross MD      predniSONE  5 mg Oral Daily Catie Ross MD      rOPINIRole  0 25 mg Oral BID Catie Ross MD      sertraline  200 mg Oral Daily Catie Ross MD      sevelamer  800 mg Oral TID With Meals Catie Ross MD      sodium bicarbonate  1,300 mg Oral BID after meals Catie Ross MD      tacrolimus  1 mg Oral HS Cal Cameron PA-C      tacrolimus  2 mg Oral Daily Anitha Antunez PA-C         PRN Meds:   acetaminophen    doxercalciferol    epoetin rebecca    ondansetron    oxyCODONE    Continuous Infusions:       Invasive Devices: Invasive Devices     Peripheral Intravenous Line            Peripheral IV 09/16/20 Left Forearm 2 days          Line            Hemodialysis AV Fistula 07/13/20 Right Upper arm 67 days          Hemodialysis Catheter            HD Permanent Double Catheter -- days          Drain            Urethral Catheter Temperature probe 2 days                  LABORATORY:    Results from last 7 days   Lab Units 09/18/20  0302 09/17/20  1028 09/16/20 2010 09/16/20  1131 09/16/20  1009   WBC Thousand/uL 7 46 5 94  --   --  7 83   HEMOGLOBIN g/dL 8 9* 8 5*  --   --  9 6*   HEMATOCRIT % 27 7* 25 6*  --   --  31 0*   PLATELETS Thousands/uL 172 167 175  --  240   POTASSIUM mmol/L 4 4 4 1 3 5 6 9* 6 6*   CHLORIDE mmol/L 102 101 102 110* 109*   CO2 mmol/L 18* 22 22 8* 7*   BUN mg/dL 53* 45* 38* 123* 116*   CREATININE mg/dL 7 35* 6 53* 4 77* 12 60* 12 90*   CALCIUM mg/dL 8 4 8 0* 8 1* 8 3 8 7   MAGNESIUM mg/dL  --  2 6 2 4  --  4 1*   PHOSPHORUS mg/dL  --  5 4* 3 6  --  8 2*      rest all reviewed    RADIOLOGY:  CT abdomen pelvis wo contrast   Final Result by Olivier Laird MD (09/18 7317)      1  There is perinephric stranding about the left lower quadrant transplant kidney concerning for pyelonephritis  2   Moreno catheter within the bladder and mild perivesicular fat stranding likely due to cystitis  3   Mild thickening of the distal esophagus may be due to esophagitis  4   Moderate thickening of the 2nd-3rd portion of the duodenum with surrounding fluid may be due to duodenitis  5   Thickening of the ascending colon with surrounding fat stranding may be due to nonspecific colitis versus reactive inflammatory change due the adjacent duodenum  6   Stable pericardial effusion                 Workstation performed: [id number]         CT head without contrast Final Result by Trevor Alexander MD (09/16 1053)      No acute intracranial abnormality  Workstation performed: [id number]           Rest all reviewed    Portions of the record may have been created with voice recognition software  Occasional wrong word or "sound a like" substitutions may have occurred due to the inherent limitations of voice recognition software  Read the chart carefully and recognize, using context, where substitutions have occurred  If you have any questions, please contact the dictating provider

## 2020-09-19 NOTE — ASSESSMENT & PLAN NOTE
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

## 2020-09-19 NOTE — PLAN OF CARE
Post-Dialysis RN Treatment Note    Blood Pressure:  Pre 162/56 mm/Hg  Post 152/66 mmHg   EDW  87 kg    Weight:  Pre 81 kg   Post 79 7 kg   Mode of weight measurement: bed scale   Volume Removed  1500 ml    Treatment duration 210 minutes    NS given:  none   Treatment shortened:  no   Medications given during Rx:  epogen 8000 units and hectorol 1 mcg   Estimated Kt/V:  1 75   Access type: right permacath   Access Status: maintains 400 bfr   Report called to primary nurse:  Raymundo Ring RN via telephone call          Chronic hemodialysis treatment ordered for 210 minutes using 3 mEq/L dialysate solution for serum potassium 4 4 yesterday  Pre HD BMP sent to lab  Fluid goal 1500 ml as discussed with Dr Deepak Sumner at bedside          Problem: METABOLIC, FLUID AND ELECTROLYTES - ADULT  Goal: Electrolytes maintained within normal limits  Description: INTERVENTIONS:  - Monitor labs and assess patient for signs and symptoms of electrolyte imbalances  - Administer electrolyte replacement as ordered  - Monitor response to electrolyte replacements, including repeat lab results as appropriate  - Instruct patient on fluid and nutrition as appropriate  9/19/2020 0956 by Avila Gomez RN  Outcome: Progressing  9/19/2020 0956 by Avila Gomez RN  Outcome: Progressing  Goal: Fluid balance maintained  Description: INTERVENTIONS:  - Monitor labs   - Monitor I/O and WT  - Instruct patient on fluid and nutrition as appropriate  - Assess for signs & symptoms of volume excess or deficit  9/19/2020 0956 by Avila Gomez RN  Outcome: Progressing  9/19/2020 0956 by Avila Gomez RN  Outcome: Progressing

## 2020-09-19 NOTE — ASSESSMENT & PLAN NOTE
Lab Results   Component Value Date    HGBA1C 5 2 08/21/2020       Recent Labs     09/18/20 2123 09/18/20  2202 09/19/20  0251 09/19/20  0639   POCGLU 68 136 87 102       Blood Sugar Average: Last 72 hrs:  (P) 94     On Toujeo 6 units hs and Humalog sliding scale as outpatient  Lantus 3 hs, SSI  Monitor blood sugars

## 2020-09-19 NOTE — ASSESSMENT & PLAN NOTE
Continue antiemetics  Patient reports abdominal pain and intractable nausea  Poor p o   Intake  Imaging studies concerning for duodenitis esophagitis will add PPI  Discussed with Nephrology, GI consult at the request of Nephrology  GI consult

## 2020-09-20 NOTE — ASSESSMENT & PLAN NOTE
Intractable nausea abdominal pain  Discussed with GI status post EGD noted to have ulcers in the duodenum and dusky appearance of interim concerning for ischemia  Follow-up on CTA - and consider vascular surgery consult  GI following  Continue pantoprazole, antiemetics supportive care

## 2020-09-20 NOTE — ASSESSMENT & PLAN NOTE
Lab Results   Component Value Date    HGBA1C 5 2 08/21/2020       Recent Labs     09/19/20  1624 09/19/20  2154 09/20/20  0641 09/20/20  1019   POCGLU 104 90 89 90       Blood Sugar Average: Last 72 hrs:  (P) 93 [id number]     On Toujeo 6 units hs and Humalog sliding scale as outpatient  Lantus 3 hs, SSI  Monitor blood sugars

## 2020-09-20 NOTE — ANESTHESIA POSTPROCEDURE EVALUATION
Post-Op Assessment Note    CV Status:  Stable  Pain Score: 0    Pain management: adequate     Mental Status:  Alert and awake   Hydration Status:  Euvolemic   PONV Controlled:  Controlled   Airway Patency:  Patent      Post Op Vitals Reviewed: Yes      Staff: CRNA   Comments: 96% ORA,report to RN        No complications documented      BP      Temp      Pulse     Resp      SpO2

## 2020-09-20 NOTE — PROGRESS NOTES
Progress Note - Alexandre Hutchison 1965, 63 y o  female MRN: [id number]    Unit/Bed#: [facility] 531-01 Encounter: [id number]    Primary Care Provider: Mikie Whitehead MD   Date and time admitted to hospital: 9/16/2020  9:34 AM        * UTI (urinary tract infection)  Assessment & Plan  · Possible urinary tract infection  · Empirically on IV cefepime  · Abdominal imaging concerning for pyelonephritis  · Urine culture sensitivity noted    Hyperkalemia  Assessment & Plan  · Likely from missed HD x 2  · S/p HD today  · Improved    ESRD (end stage renal disease) ([facility] [address] )  Assessment & Plan  · On HD on Tuesday/Thursday/Saturday  · Dialysis per nephrology  · Nephrology following    Metabolic acidosis  Assessment & Plan  · Resolved  · Monitor    Multiple myeloma not having achieved remission [facility])  Assessment & Plan  · IgG kappa MM stage III diagnosed in April 2019  · 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, 2017  · Ct Lanalodimide 2 5 mg daily  · Ct outpatient follow-up with primary oncologist Dr Rodolfo Crawford    Hyperlipidemia  Assessment & Plan  · Ct Pravastatin 80 mg daily    FELIPE (generalized anxiety disorder)/depression  Assessment & Plan  · As needed lorazepam held due to encephalopathy  · Continue Abilify, BuSpar, sertraline       Failure of pancreas transplant  Assessment & Plan  · S/p pancreatic/kidney transplant in 1998  · Failed pancreatic transplant in 2017    Intractable nausea and vomiting  Assessment & Plan  Intractable nausea abdominal pain  Discussed with GI status post EGD noted to have ulcers in the duodenum and dusky appearance of interim concerning for ischemia  Follow-up on CTA - and consider vascular surgery consult  GI following  Continue pantoprazole, antiemetics supportive care      Diabetes mellitus type I, controlled [facility])  Assessment & Plan  Lab Results   Component Value Date    HGBA1C 5 2 08/21/2020       Recent Labs     09/19/20  1624 09/19/20  2154 09/20/20  0641 09/20/20  1019   POCGLU 104 90 89 90       Blood Sugar Average: Last 72 hrs:  (P) 93 [id number]     On Toujeo 6 units hs and Humalog sliding scale as outpatient  Lantus 3 hs, SSI  Monitor blood sugars      Toxic metabolic encephalopathy  Assessment & Plan  · Likely metabolic  · Improving  · Monitor closely    Hypertension  Assessment & Plan  · Was on Torsemide 20 mg daily, doxazosin 4 mg at bedtime, hydralazine 100 mg 3 times daily  · Monitor blood pressures  · Avoid hypotension      Acquired hypothyroidism  Assessment & Plan  · Ct levothyroxine 125 mcg daily    Renal transplant recipient  Assessment & Plan  · S/p renal-pancreatic transplant in   · On HD since May, 2020  · On prednisone 5 mg daily, Tacrolimus 2 mg BID - continue  · F/u  Tacrolimus level - goal level 2-4          VTE Pharmacologic Prophylaxis:   Pharmacologic: Heparin  Mechanical VTE Prophylaxis in Place: Yes    Patient Centered Rounds: I have performed bedside rounds with nursing staff today  Discussions with Specialists or Other Care Team Provider:  GI    Education and Discussions with Family / Patient:  Discussed with the patient, updated father Estefani Ho, questions answered    Time Spent for Care: 30 minutes  More than 50% of total time spent on counseling and coordination of care as described above  Current Length of Stay: 4 day(s)    Current Patient Status: Inpatient   Certification Statement: The patient will continue to require additional inpatient hospital stay due to As outlined    Discharge Plan:  Awaiting clinical and symptomatic improvement    Code Status: Level 1 - Full Code      Subjective:     Comfortably sitting up in bed  Reports nausea abdominal pain  Poor p o   Intake      Objective:     Vitals:   Temp (24hrs), Av °F (36 7 °C), Min:97 7 °F (36 5 °C), Max:98 6 °F (37 °C)    Temp:  [97 7 °F (36 5 °C)-98 6 °F (37 °C)] 97 9 °F (36 6 °C)  HR:  [74-96] 82  Resp:  [12-20] 12  BP: (127-170)/(55-75) 157/57  SpO2:  [96 %-98 %] 98 %  Body mass index is 31 63 kg/m²  Input and Output Summary (last 24 hours): Intake/Output Summary (Last 24 hours) at 9/20/2020 1304  Last data filed at 9/20/2020 1144  Gross per 24 hour   Intake 290 ml   Output 700 ml   Net -410 ml       Physical Exam:     Physical Exam    Comfortably sitting up in bed  Neck supple  Lungs diminished breath sounds bilaterally  Heart sounds S1-S2 noted  Abdomen soft tenderness diffuse noted  Awake obeys simple commands  Pulses noted  No rash    Additional Data:     Labs:    Results from last 7 days   Lab Units 09/18/20  0302 09/17/20  1028   WBC Thousand/uL 7 46 5 94   HEMOGLOBIN g/dL 8 9* 8 5*   HEMATOCRIT % 27 7* 25 6*   PLATELETS Thousands/uL 172 167   NEUTROS PCT %  --  68   LYMPHS PCT %  --  13*   MONOS PCT %  --  16*   EOS PCT %  --  1     Results from last 7 days   Lab Units 09/19/20  0928 09/18/20  0302   SODIUM mmol/L 133* 133*   POTASSIUM mmol/L 4 5 4 4   CHLORIDE mmol/L 105 102   CO2 mmol/L 16* 18*   BUN mg/dL 65* 53*   CREATININE mg/dL 8 25* 7 35*   ANION GAP mmol/L 12 13   CALCIUM mg/dL 7 7* 8 4   ALBUMIN g/dL  --  3 0*   TOTAL BILIRUBIN mg/dL  --  0 68   ALK PHOS U/L  --  151*   ALT U/L  --  31   AST U/L  --  21   GLUCOSE RANDOM mg/dL 111 90     Results from last 7 days   Lab Units 09/16/20  1030   INR  1 25*     Results from last 7 days   Lab Units 09/20/20  1019 09/20/20  0641 09/19/20  2154 09/19/20  1624 09/19/20  0639 09/19/20  0251 09/18/20  2202 09/18/20  2123 09/18/20  1646 09/18/20  1053 09/18/20  0629 09/17/20  2348   POC GLUCOSE mg/dl 90 89 90 104 102 87 136 68 99 86 88 89         Results from last 7 days   Lab Units 09/18/20  0440 09/17/20  1028 09/16/20  1030   LACTIC ACID mmol/L  --   --  1 5   PROCALCITONIN ng/ml 1 28* 1 63* 0 18           * I Have Reviewed All Lab Data Listed Above  * Additional Pertinent Lab Tests Reviewed:  All Labs Within Last 24 Hours Reviewed    Imaging:    Imaging Reports Reviewed Today Include:   Imaging Personally Reviewed by Myself Includes:     Recent Cultures (last 7 days):     Results from last 7 days   Lab Units 09/16/20  1318 09/16/20  1030 09/16/20  1009   BLOOD CULTURE   --  No Growth After 4 Days  No Growth After 4 Days     URINE CULTURE  >100,000 cfu/ml Escherichia coli*  --   --        Last 24 Hours Medication List:   Current Facility-Administered Medications   Medication Dose Route Frequency Provider Last Rate    acetaminophen  650 mg Oral Q6H PRN Reji Oneill MD      ARIPiprazole  30 mg Oral HS Reji Oneill MD      aspirin  81 mg Oral Daily Reji Oneill MD      busPIRone  10 mg Oral BID Reji Oneill MD      cefepime  1,000 mg Intravenous Q24H Reji Oneill MD 1,000 mg (09/19/20 1352)    dextrose  50 mL/hr Intravenous Continuous Eileen Meeks MD 50 mL/hr (09/20/20 1243)    doxercalciferol  1 mcg Intravenous After Dialysis Reji Oneill MD      epoetin rebecca  8,000 Units Intravenous After Dialysis Reji Oneill MD      folic acid  4,771 mcg Oral Daily Reji Oneill MD      heparin (porcine)  5,000 Units Subcutaneous [facility] Reji Oneill MD      HYDROmorphone  0 5 mg Intravenous Q4H PRN Tee Pereira MD      insulin glargine  3 Units Subcutaneous HS Reji Oneill MD      insulin lispro  1-5 Units Subcutaneous TID [facility] Reji Oneill MD      insulin lispro  1-5 Units Subcutaneous HS Reji Oneill MD      lenalidomide  2 5 mg Oral Daily Reji Oneill MD      levothyroxine  125 mcg Oral Early Morning Reji Oneill MD      ondansetron  4 mg Intravenous Q6H PRN Reji Oneill MD      oxyCODONE  2 5 mg Oral Q6H PRN Tee Pereira MD      pantoprazole  40 mg Intravenous Q12H [address] Eileen Meeks MD      pravastatin  80 mg Oral Daily Reji Oneill MD      predniSONE  5 mg Oral Daily Reji Oneill MD      rOPINIRole  0 25 mg Oral BID Reji Oneill MD      sertraline  200 mg Oral Daily Reji Oneill MD      sevelamer  800 mg Oral TID With Meals Catie Ross MD      sodium bicarbonate  1,300 mg Oral BID after meals Catie Ross MD      tacrolimus  1 mg Oral HS Cal Cameron PA-C      tacrolimus  2 mg Oral Daily Cal Cameron PA-C       Facility-Administered Medications Ordered in Other Encounters   Medication Dose Route Frequency Provider Last Rate    fentanyl citrate (PF)    PRN Bran Scout, [facility]      lidocaine   Intravenous PRN Bran Scout, CRNA      propofol    PRN Bran Scout, CRNA          Today, Patient Was Seen By: Yousuf Muñoz MD    ** Please Note: Dictation voice to text software may have been used in the creation of this document   **

## 2020-09-20 NOTE — ASSESSMENT & PLAN NOTE
· S/p renal-pancreatic transplant in 1998  · On HD since May, 2020  · On prednisone 5 mg daily, Tacrolimus 2 mg BID - continue  · F/u  Tacrolimus level - goal level 2-4 Quality 226: Preventive Care And Screening: Tobacco Use: Screening And Cessation Intervention: Patient screened for tobacco and never smoked Quality 110: Preventive Care And Screening: Influenza Immunization: Influenza immunization was not ordered or administered, reason not given Quality 431: Preventive Care And Screening: Unhealthy Alcohol Use - Screening: Patient screened for unhealthy alcohol use using a single question and scores less than 2 times per year Detail Level: Detailed

## 2020-09-20 NOTE — TREATMENT PLAN
Status post hemodialysis yesterday with UF close to 1 5 L  Chart reviewed  Plan is for next dialysis session on 09/22/2020  Nephrology will see in full on 09/21/2020  Based on chart review appears patient is planned for EGD with GI on 09/21/2020 due to concerns for gastritis  Remains on D5 at 30 cc an hour due to inadequate p o  Intake  Please call with any questions or concerns from a nephrology standpoint in the interim

## 2020-09-20 NOTE — ANESTHESIA PREPROCEDURE EVALUATION
Ef65%, nml RV, mod AR    Procedure:  EGD    Relevant Problems   ANESTHESIA (within normal limits)      CARDIO   (+) Aortic regurgitation   (+) Atrial fibrillation (HCC)   (+) Chronic diastolic congestive heart failure (HCC)   (+) Hyperlipidemia   (+) Hypertension      ENDO   (+) Acquired hypothyroidism   (+) Diabetes mellitus type I, controlled ([facility] [address] )   (+) Secondary hyperparathyroidism, renal (HCC)      GI/HEPATIC   (+) Failure of pancreas transplant      /RENAL   (+) Chronic kidney disease-mineral and bone disorder   (+) Diabetic nephropathy (HCC)   (+) ESRD (end stage renal disease) (HCC)   (+) Renal transplant recipient   (+) Status post kidney transplant      HEMATOLOGY   (+) Anemia      NEURO/PSYCH   (+) Diabetic polyneuropathy ([facility])   (+) FELIPE (generalized anxiety disorder)/depression   (+) History of herpes zoster   (+) Major depressive disorder, recurrent episode, mild ([facility])   (+) Neuropathy in diabetes ([facility])   (+) Post-traumatic stress disorder, chronic        Physical Exam    Airway    Mallampati score: III  TM Distance: <3 FB  Neck ROM: full     Dental   No notable dental hx     Cardiovascular      Pulmonary      Other Findings        Anesthesia Plan  ASA Score- 3     Anesthesia Type- IV sedation with anesthesia with ASA Monitors  Additional Monitors:   Airway Plan:     Comment: Per patient, appropriately NPO, denies active CP/SOB/wheezing/symptoms related to heartburn/nausea/vomiting  Plan Factors-Exercise tolerance (METS): >4 METS  Chart reviewed  EKG reviewed  Existing labs reviewed  Patient summary reviewed  Patient is not a current smoker  Induction- intravenous  Postoperative Plan-     Informed Consent- Anesthetic plan and risks discussed with patient  I personally reviewed this patient with the CRNA  Discussed and agreed on the Anesthesia Plan with the CRNA  Jose Garcia

## 2020-09-20 NOTE — ASSESSMENT & PLAN NOTE
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

## 2020-09-21 NOTE — PROGRESS NOTES
NEPHROLOGY PROGRESS NOTE   Ne Crump 63 y o  female MRN: [id number]  Unit/Bed#: [facility] 531-01 Encounter: [id number]      ASSESSMENT/PLAN:  1  ESRD: on HD TTS at [facility]  S/p failed renal transplant and started on HD May 2020  · Missed 1 week of treatment prior to admission due to not feeling well  · Next HD tomorrow  · Home immunosuppression:  tacrolimus 2mg am and 1mg pm and prednisone 5mg daily   · Goal tac level 2-4  Last level 2 5 on 09/17  2  Urinary tract infection with possible pyelonephritis:  On IV cefepime  3  Abdominal pain: GI on board and EGD revealed esophagitis, ulcers and possible ischemic injury  CTA showed atherosclerotic disease and concern for congestive changes  Vascular consulted  · Consider stopping carafate as risk of aluminum tox in ESRD--will d/w GI   4  Hyperkalemia:  Resolved with dialysis  Continue low-potassium diet  5  Anemia of CKD:  Continue epogen 8000 units qHD  6  Mineral Bone Disease of CKD:  Continue Renagel 1 tab t i d  With meals  7  Multiple Myeloma: on revlimid per hematology   8  Access: R permacath  · Also has RUE AVF placed 7/13/2020 and duplex 8/31 shows that access appears to be mature but is too deep -- following up with vascular on 9/25 and may need superficialization     Plan Summary:    HD tomorrow     SUBJECTIVE:  Not feeling well and still complains of significant abdominal pain  Not able to eat  No vomiting or diarrhea      OBJECTIVE:  Current Weight: Weight - Scale: 91 6 kg (201 lb 15 1 oz)  Vitals:    09/20/20 2254   BP: 168/61   Pulse: 82   Resp: 17   Temp: 98 2 °F (36 8 °C)   SpO2: 100%       Intake/Output Summary (Last 24 hours) at 9/21/2020 1017  Last data filed at 9/21/2020 0900  Gross per 24 hour   Intake 280 ml   Output 225 ml   Net 55 ml     General:  No acute distress  Skin:  No rash  Eyes:  Sclerae anicteric  ENT:  Moist mucous membranes  Neck:  Trachea midline with no JVD  Chest:  Clear to auscultation bilaterally  CVS:  Regular rate and rhythm  Abdomen:  Soft, tender to palpation   Extremities:  No edema  Neuro:  Awake and alert  Psych:  Appropriate affect      Medications:  Scheduled Meds:  Current Facility-Administered Medications   Medication Dose Route Frequency Provider Last Rate    acetaminophen  650 mg Oral Q6H PRN Abelardo Hughes MD      al mag oxide-diphenhydramine-lidocaine viscous  10 mL Swish & Swallow Q6H [facility] Juan Reyes MD      ARIPiprazole  30 mg Oral HS Abelardo Hughes MD      aspirin  81 mg Oral Daily Abelardo Hughes MD      busPIRone  10 mg Oral BID Abelardo Hughes MD      cefepime  1,000 mg Intravenous Q24H Abelardo Hughes MD 1,000 mg (09/20/20 1416)    dextrose  50 mL/hr Intravenous Continuous Eileen Meeks MD 50 mL/hr (09/20/20 1243)    doxercalciferol  1 mcg Intravenous After Dialysis Abelardo Hughes MD      epoetin rebecca  8,000 Units Intravenous After Dialysis Abelardo Hughes MD      folic acid  8,183 mcg Oral Daily Abelardo Hughes MD      heparin (porcine)  5,000 Units Subcutaneous [facility] Abelardo Hughes MD      HYDROmorphone  0 5 mg Intravenous Q4H PRN Alyssa Shahid MD      insulin lispro  1-5 Units Subcutaneous TID [facility] Abelardo Hughes MD      insulin lispro  1-5 Units Subcutaneous HS Abelardo Hughes MD      lenalidomide  2 5 mg Oral Daily Abelardo Hughes MD      levothyroxine  125 mcg Oral Early Morning Abelardo Hughes MD      ondansetron  4 mg Intravenous Q6H PRN Abelardo Hughes MD      oxyCODONE  2 5 mg Oral Q6H PRN Alyssa Shahid MD      pantoprazole  40 mg Intravenous Q12H [facility] Eileen Meeks MD      pravastatin  80 mg Oral Daily Abelardo Hughes MD      predniSONE  5 mg Oral Daily Abelardo Hughes MD      rOPINIRole  0 25 mg Oral BID Abelardo Hughes MD      sertraline  200 mg Oral Daily Abelardo Hughes MD      sevelamer  800 mg Oral TID With Meals Abelardo Hughes MD      sodium bicarbonate  1,300 mg Oral BID after meals Shadi Bravo Dorcas Amador MD      sucralfate  1 g Oral 4x Daily (AC & HS) Eileen Meeks MD      tacrolimus  1 mg Oral HS Aziza Mcnair PA-C      tacrolimus  2 mg Oral Daily Aziza Mcnair PA-C         PRN Meds:   acetaminophen    doxercalciferol    epoetin rebecca    HYDROmorphone    ondansetron    oxyCODONE    Continuous Infusions:dextrose, 50 mL/hr, Last Rate: 50 mL/hr (09/20/20 1243)        Laboratory Results:  Results from last 7 days   Lab Units 09/19/20  0928 09/18/20  0302 09/17/20  1028 09/16/20 2010 09/16/20  1131 09/16/20  1009   WBC Thousand/uL  --  7 46 5 94  --   --  7 83   HEMOGLOBIN g/dL  --  8 9* 8 5*  --   --  9 6*   HEMATOCRIT %  --  27 7* 25 6*  --   --  31 0*   PLATELETS Thousands/uL  --  172 167 175  --  240   SODIUM mmol/L 133* 133* 133* 137 135* 133*   POTASSIUM mmol/L 4 5 4 4 4 1 3 5 6 9* 6 6*   CHLORIDE mmol/L 105 102 101 102 110* 109*   CO2 mmol/L 16* 18* 22 22 8* 7*   BUN mg/dL 65* 53* 45* 38* 123* 116*   CREATININE mg/dL 8 25* 7 35* 6 53* 4 77* 12 60* 12 90*   CALCIUM mg/dL 7 7* 8 4 8 0* 8 1* 8 3 8 7   MAGNESIUM mg/dL  --   --  2 6 2 4  --  4 1*   PHOSPHORUS mg/dL  --   --  5 4* 3 6  --  8 2*

## 2020-09-21 NOTE — RESTORATIVE TECHNICIAN NOTE
Restorative Specialist Mobility Note       Activity: Ambulate in room, Chair     Assistive Device: None        Repositioned: Sitting, Up in chair

## 2020-09-21 NOTE — PHYSICAL THERAPY NOTE
Physical Therapy Evaluation     Patient's Name: Sung Henderson    Admitting Diagnosis  Hyperkalemia [E87 5]  Confusion [R41 0]  UTI (urinary tract infection) [N39 0]  Altered mental status [R41 82]  CKD (chronic kidney disease) requiring chronic dialysis ([facility] ) [N18 6, Z99 2]  Generalized weakness [R53 1]  Sepsis ([place] [address] ) [A41 9]    Problem List  Patient Active Problem List   Diagnosis    Renal transplant recipient    Chronic bacteriuria, likely colonization    Acquired hypothyroidism    Hypertension    Toxic metabolic encephalopathy    Colitis    Diabetes mellitus type I, controlled ([place] [address] )    Anemia    Status post kidney transplant    Immunosuppression ([place] [address] )    Abdominal pain    Weakness    Chronic diastolic congestive [facility] failure ([facility]    Urinary incontinence    Calculus, bladder, diverticulum    Intractable nausea and vomiting    Hypercalcemia    Chronic constipation    Atrial fibrillation ([facility])    Kaiser esophagus    Cataract    Cocaine abuse in remission ([place] [address] )    Gastric and duodenal disease    Diabetic nephropathy ([place] [address] )    Diabetic polyneuropathy ([place] [address] )    Retinopathy, diabetic, bilateral ([place] [address] )    Diastolic dysfunction    Essential and other specified forms of tremor    Failure of pancreas transplant    FELIPE (generalized anxiety disorder)/depression    Hyperlipidemia    Irritable bowel syndrome    Major depressive disorder, recurrent episode, mild ([facility])    Aortic regurgitation    OAB (overactive bladder)    Osteoporosis    Peripheral vascular disease ([facility])    Post-traumatic stress disorder, chronic    Restless legs syndrome    Secondary hyperparathyroidism, renal ([place] [address] )    Neuropathy in diabetes ([facility] )    History of herpes zoster    Subclavian artery stenosis, left ([facility])    Abnormal ECG    Multiple myeloma not having achieved remission ([facility])    Chronic kidney disease-mineral and bone disorder    Metabolic acidosis    Ambulatory dysfunction    Low bicarbonate    Urinary retention    Asymptomatic bacteriuria    Hyperphosphatemia    Acquired keratoderma    Disorder of bone and articular cartilage    [facility]D [facility]end stage renal disease) ([facility]    S/P arteriovenous (AV) fistula creation    Type 1 diabetes mellitus with hyperglycemia ([facility])    UTI (urinary tract infection)    Hyperkalemia       Past Medical History  Past Medical History:   Diagnosis Date    Abnormal liver function test     Acute kidney injury ([facility] )     Acute on chronic congestive heart failure (HCC)     Allergic urticaria     Anemia     Cancer (HCC)     Multiple myeloma    Cervical dysplasia     Cholelithiasis     Chronic diastolic (congestive) heart failure ([facility] [address] ) 9/18/2017    Diabetes mellitus (HCC)     Previous, controlled with diet    Diabetes mellitus with foot ulcer ([facility] )     Disease of thyroid gland     Encephalopathy     Hematuria     + leak est- secondary to UTIs/panc drainage    History of transfusion     Hyperkalemia     Hypertension     Iliotibial band syndrome     Lumbar radiculopathy     Multiple myeloma (HCC)     Multiple myeloma ([facility] [address] )     Night blindness     Nonrheumatic aortic (valve) insufficiency     Pneumonia     Renal disorder     Retinopathy     Seborrhea     Seizure ([facility] [address] )     Shingles     Sinus tachycardia     B blocker - cardio echo stress test 02 normal/neg LE doppler 2/02 OK and 12/07    Status post simultaneous kidney and pancreas transplant ([facility] )     Toe amputation status     Trochanteric bursitis        Past Surgical History  Past Surgical History:   Procedure Laterality Date    CATARACT EXTRACTION      CHOLECYSTECTOMY      COLONOSCOPY      two polyps in the rectum removed and biopsied diverticulosis in the sigmoid colon, external hemorrhoiods- Dr Barfield    COMBINED KIDNEY-PANCREAS TRANSPLANT N/A     CT BONE MARROW BIOPSY AND ASPIRATION  4/17/2019    CYSTOSCOPY N/A 10/13/2016    Procedure: CYSTOSCOPY, retrograde pyelogram, biopsy of ureteral polyp;  Surgeon: Carolyn Finch MD;  [facility]: [facility];  Service:     DILATION AND CURETTAGE OF UTERUS      ESOPHAGOGASTRODUODENOSCOPY N/A 11/20/2017    Procedure: ESOPHAGOGASTRODUODENOSCOPY (EGD); Surgeon: Rafa Mendoza DO;  Location: [facility]; Service: Gastroenterology    EYE SURGERY      cataracts    FOOT AMPUTATION THROUGH METATARSAL Left     FOOT SURGERY Right     excision of metatarsal heads    HALLUX VALGUS CORRECTION Right     IR TUNNELED DIALYSIS CATHETER PLACEMENT  5/5/2020    NEPHRECTOMY TRANSPLANTED ORGAN      PANCREATIC TRANSPLANT REMOVAL  1998    MO ANASTOMOSIS,AV,ANY SITE Right 7/13/2020    Procedure: Creation of right brachiocephalic fistula; Surgeon: Kylie Dunn MD;  [facility]: [facility];  Service: Vascular          09/21/20 1442   PT Last Visit   PT Visit Date 09/21/20   Note Type   Note type Eval only   Pain Assessment   Pain Assessment Tool 0-10   Pain Score 5   Pain Location/Orientation Location: Abdomen   Patient's Stated Pain Goal No pain   Hospital Pain Intervention(s) Repositioned; Ambulation/increased activity; Rest   Home Living   Type of Home Apartment   Home Layout One level  (4STE)   Bathroom Shower/Tub Tub/shower unit   Bathroom Toilet Standard   Bathroom Equipment Shower chair   Home Equipment Walker;Cane   Additional Comments Pt reports use of SPC PTA  Prior Function   Level of [place] Independent with ADLs and functional mobility; Needs assistance with IADLs   Lives With Alone   Receives Help From Family   ADL Assistance Independent   IADLs Needs assistance   Falls in the last 6 months 0  (per pt report)   Vocational Retired   Comments Pt reports she is no longer able to DC to parents home as her dad is sick and utilizing first floor so she will be [address] to her own home alone  Pt reports no one able to stay with her upon DC  Pt reports difficulty with negotiating stairs to enter/exit home recently     Restrictions/Precautions   Weight Bearing Precautions Per Order No   Braces or Orthoses   (refusing orthotic shoe for hx TMA)   Other Precautions Contact/isolation;Cognitive; Fall Risk;Pain; Chair Alarm; Bed Alarm   General   Family/Caregiver Present Yes  (sister)   Cognition   Overall Cognitive Status Impaired   Arousal/Participation Alert   Attention Attends with cues to redirect   Orientation Level Oriented X4   Memory Decreased recall of precautions   Following Commands Follows one step commands with increased time or repetition   Comments Pt with decreased insight into deficist and decreased safety awareness, requires cues for safety throughout session  Pt reports she is "loopy" this session from pain meds  RLE Assessment   RLE Assessment   (functionally 3+/5)   LLE Assessment   LLE Assessment   (functionally 3+/5)   Bed Mobility   Additional Comments OOB in chair upon PT arrival   Pt left upright in bedside chair with chair alarm intact and all needs in reach at end of therapy session  Transfers   Sit to Stand 4  Minimal assistance   Additional items Assist x 1; Increased time required;Verbal cues   Stand to Sit 4  Minimal assistance   Additional items Assist x 1; Increased time required;Verbal cues   Additional Comments Transfers with    VC for hand placement and safety  Pt with reports of light headedness and dizziness upon stance  Ambulation/Elevation   Gait pattern Excessively slow; Short stride;Decreased foot clearance; Improper Weight shift  (unsteady)   Gait Assistance 4  Minimal assist   Additional items Assist x 1;Verbal cues; Tactile cues  (assist of second for chair follow)   Assistive Device Rolling walker   Distance 25 ft x 2   Balance   Static Sitting Fair +   Dynamic Sitting Fair   Static Standing Fair -   Dynamic Standing Poor +   Ambulatory Poor +   Endurance Deficit   Endurance Deficit Yes   Endurance Deficit Description dizziness, fatigue, light headedness, nausea   Activity Tolerance   Activity Tolerance Patient limited by fatigue   Medical Staff Made Aware OT [place]   Nurse Made Aware RN cleared pt to be seen by PT   Assessment   Prognosis Good   Problem List Decreased strength;Decreased endurance; Impaired balance;Decreased mobility; Decreased safety awareness;Pain   Assessment Pt seen for high complexity PT evaluation due to decrease in functional mobility status compared to baseline  Pt with active PT eval/treat orders at this time  Pt is a 63 y o  yo F who [facility] with [facility]TI on 9/16/20  Pt  has a past medical history of Abnormal liver function test, Acute kidney injury ([address] ), Acute on chronic congestive heart failure ([address] ), Allergic urticaria, Anemia, Cancer ([address] ), Cervical dysplasia, Cholelithiasis, Chronic diastolic (congestive) heart failure ([address] ) (9/18/2017), Diabetes mellitus ([address] ), Diabetes mellitus with foot ulcer ([address] ), Disease of thyroid gland, Encephalopathy, Hematuria, History of transfusion, Hyperkalemia, Hypertension, Iliotibial band syndrome, Lumbar radiculopathy, Multiple myeloma ([address] ), Multiple myeloma ([address] ), Night blindness, Nonrheumatic aortic (valve) insufficiency, Pneumonia, Renal disorder, Retinopathy, Seborrhea, Seizure ([address] ), Shingles, Sinus tachycardia, Status post simultaneous kidney and pancreas transplant ([address] ), Toe amputation status, and Trochanteric bursitis  Pt resides alone in apartment with 4STE  Pt presents with decreased strength, balance, endurance that contribute to limitations in bed mobility, functional transfers, functional mobility  Pt requires Min A for STS and ambulation with RW at this time  Pt left upright in bedside chair with chair alarm intact and with all needs in reach  Pt will benefit from skilled therapy in order to address current impairments and functional limitations  PT to follow pt and recommending rehab once medically cleared  Barriers to Discharge Decreased caregiver support; Inaccessible home environment   Goals   Patient Goals to go home and not to rehab STG Expiration Date 10/05/20   Short Term Goal #1 1  Pt will demonstrate ability to perform all aspects of bed mobility with I in order to increase independence and decrease burden on caregivers  2  Pt will demonstrate ability to perform functional transfers with Mod I in order to increase independence and decrease burden on caregivers  3  Pt will demonstrate ability to ambulate 200 ft with least restrictive AD with Mod I in order to return to mobility safely  4  Pt will demonstrate ability to negotiate 4 steps with/without HR and Mod I in order to return to household/community mobility safely  5  Pt will demonstrate improved balance by one grade order to decrease risk of falls  6  Pt will increase b/l LE strength by 1 grade in order to increase ease of functional mobility and transfers  Plan   Treatment/Interventions Functional transfer training;LE strengthening/ROM; Elevations; Therapeutic exercise; Endurance training;Patient/family training;Equipment eval/education; Bed mobility;Gait training;Spoke to nursing   PT Frequency Other (Comment)  (3-5x/wk)   Recommendation   PT Discharge Recommendation Post-Acute Rehabilitation Services   Equipment Recommended Walker   PT - OK to Discharge Yes  (to rehab once medically cleared)   Modified Crawford Scale   Modified Crawford Scale 4         Taryn Collins, PT, DPT

## 2020-09-21 NOTE — PROGRESS NOTES
Progress Note - Hermilo Camarillo 1965, 63 y o  female MRN: [id number]    Unit/Bed#: [facility] 531-01 Encounter: [id number]    Primary Care Provider: Shakila Singh MD   Date and time admitted to hospital: 9/16/2020  9:34 AM        * UTI (urinary tract infection)  Assessment & Plan  Possible urine tract infection  Abdominal imaging concerning for pyelonephritis  Continue IV cefepime complete 10 day course of antibiotics, transition to p o  Keflex based on urine sensitivities with improving abdominal pain and nausea  Urine culture sensitivity noted    Hyperkalemia  Assessment & Plan  · Likely from missed HD x 2  · S/p HD today  · Improved    ESRD (end stage renal disease) ([facility] [address] )  Assessment & Plan  · On HD on Tuesday/Thursday/Saturday  · Dialysis per nephrology  · Nephrology following    Metabolic acidosis  Assessment & Plan  · Resolved  · Monitor    Multiple myeloma not having achieved remission [facility])  Assessment & Plan  · IgG kappa MM stage III diagnosed in April 2019  · 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, 2017  · Ct Lanalodimide 2 5 mg daily  · Ct outpatient follow-up with primary oncologist Dr Gerardo Worley    Hyperlipidemia  Assessment & Plan  · Ct Pravastatin 80 mg daily    FELIPE (generalized anxiety disorder)/depression  Assessment & Plan  · As needed lorazepam held due to encephalopathy  · Continue Abilify, BuSpar, sertraline       Failure of pancreas transplant  Assessment & Plan  · S/p pancreatic/kidney transplant in 1998  · Failed pancreatic transplant in 2017    Intractable nausea and vomiting  Assessment & Plan  Intractable nausea abdominal pain  Discussed with GI status post EGD noted to have ulcers in the esophagus, duodenum and dusky appearance of antrum concerning for ischemia  CTA abdomen noted, vascular surgery consult  GI following  Vascular surgery input noted  Continue pantoprazole, antiemetics supportive care      Diabetes mellitus type I, controlled [facility])  Assessment & Plan  Lab Results   Component Value Date    HGBA1C 5 2 08/21/2020       Recent Labs     09/20/20  2110 09/20/20  2239 09/21/20  0657 09/21/20  1136   POCGLU 83 116 103 111       Blood Sugar Average: Last 72 hrs:  (P) 96 5625     On Toujeo 6 units hs and Humalog sliding scale as outpatient  Lantus 3 hs, SSI  Monitor blood sugars      Toxic metabolic encephalopathy  Assessment & Plan  · Likely metabolic  · Improving  · Monitor closely    Hypertension  Assessment & Plan  · Was on Torsemide 20 mg daily, doxazosin 4 mg at bedtime, hydralazine 100 mg 3 times daily  · Monitor blood pressures  · Avoid hypotension      Acquired hypothyroidism  Assessment & Plan  · Ct levothyroxine 125 mcg daily    Renal transplant recipient  Assessment & Plan  · S/p renal-pancreatic transplant in 1998  · On HD since May, 2020  · On prednisone 5 mg daily, Tacrolimus 2 mg BID - continue  · F/u  Tacrolimus level - goal level 2-4          VTE Pharmacologic Prophylaxis:   Pharmacologic: Heparin  Mechanical VTE Prophylaxis in Place: Yes    Patient Centered Rounds: I have performed bedside rounds with nursing staff today  Discussions with Specialists or Other Care Team Provider:  Nephrology, vascular    Education and Discussions with Family / Patient:  Discussed with the patient, updated mother Rivka      Time Spent for Care: 30 minutes  More than 50% of total time spent on counseling and coordination of care as described above      Current Length of Stay: 5 day(s)    Current Patient Status: Inpatient   Certification Statement: The patient will continue to require additional inpatient hospital stay due to As outlined    Discharge Plan:  Awaiting clinical and symptomatic improvement    Code Status: Level 1 - Full Code      Subjective:     Comfortably sitting up in chair  Reports improving abdominal discomfort  Nausea improving however continues to have some nausea  Appetite fair  Encouraged out of bed as able        Objective:     Vitals:   Temp (24hrs), Av 2 °F (36 8 °C), Min:98 1 °F (36 7 °C), Max:98 2 °F (36 8 °C)    Temp:  [98 1 °F (36 7 °C)-98 2 °F (36 8 °C)] 98 2 °F (36 8 °C)  HR:  [74-91] 82  Resp:  [16-17] 17  BP: (147-179)/(58-75) 168/61  SpO2:  [100 %] 100 %  Body mass index is 31 63 kg/m²  Input and Output Summary (last 24 hours):        Intake/Output Summary (Last 24 hours) at 2020 1323  Last data filed at 2020 1156  Gross per 24 hour   Intake 590 ml   Output 225 ml   Net 365 ml       Physical Exam:     Physical Exam    Comfortably sitting up in chair  Neck supple  Lungs diminished breath sounds bilateral  Heart sounds S1-S2 noted  Abdomen soft mild tenderness epigastric and upper abdomen  Awake obeys simple commands  Pulses noted  No rash    Additional Data:     Labs:    Results from last 7 days   Lab Units 20  0302 20  1028   WBC Thousand/uL 7 46 5 94   HEMOGLOBIN g/dL 8 9* 8 5*   HEMATOCRIT % 27 7* 25 6*   PLATELETS Thousands/uL 172 167   NEUTROS PCT %  --  68   LYMPHS PCT %  --  13*   MONOS PCT %  --  16*   EOS PCT %  --  1     Results from last 7 days   Lab Units 20  0928 20  0302   SODIUM mmol/L 133* 133*   POTASSIUM mmol/L 4 5 4 4   CHLORIDE mmol/L 105 102   CO2 mmol/L 16* 18*   BUN mg/dL 65* 53*   CREATININE mg/dL 8 25* 7 35*   ANION GAP mmol/L 12 13   CALCIUM mg/dL 7 7* 8 4   ALBUMIN g/dL  --  3 0*   TOTAL BILIRUBIN mg/dL  --  0 68   ALK PHOS U/L  --  151*   ALT U/L  --  31   AST U/L  --  21   GLUCOSE RANDOM mg/dL 111 90     Results from last 7 days   Lab Units 20  1030   INR  1 25*     Results from last 7 days   Lab Units 20  1136 20  0657 20  2239 20  2110 20  1638 20  1019 20  0641 20  2154 20  1624 20  0639 20  0251 20  2202   POC GLUCOSE mg/dl 111 103 116 83 93 90 89 90 104 102 87 136         Results from last 7 days   Lab Units 20  1717 20  0440 20  1028 20  1030   LACTIC ACID mmol/L 0 7  --   -- 1  5   PROCALCITONIN ng/ml  --  1 28* 1 63* 0 18           * I Have Reviewed All Lab Data Listed Above  * Additional Pertinent Lab Tests Reviewed: All Labs Within Last 24 Hours Reviewed    Imaging:    Imaging Reports Reviewed Today Include:  Imaging studies noted  Imaging Personally Reviewed by Myself Includes:      Recent Cultures (last 7 days):     Results from last 7 days   Lab Units 09/16/20  1318 09/16/20  1030 09/16/20  1009   BLOOD CULTURE   --  No Growth After 5 Days  No Growth After 5 Days     URINE CULTURE  >100,000 cfu/ml Escherichia coli*  --   --        Last 24 Hours Medication List:   Current Facility-Administered Medications   Medication Dose Route Frequency Provider Last Rate    acetaminophen  650 mg Oral Q6H PRN Sudhir Parada MD      al mag oxide-diphenhydramine-lidocaine viscous  10 mL Swish & Swallow Q6H [facility] Svetlana Warren MD      ARIPiprazole  30 mg Oral HS Sudhir Parada MD      aspirin  81 mg Oral Daily Sudhir Parada MD      busPIRone  10 mg Oral BID Sudhir Parada MD      cefepime  1,000 mg Intravenous Q24H Sudhir Parada MD 1,000 mg (09/20/20 1416)    dextrose  50 mL/hr Intravenous Continuous Eileen Meeks MD 50 mL/hr (09/20/20 1243)    doxercalciferol  1 mcg Intravenous After Dialysis Sudhir Parada MD      epoetin rebecca  8,000 Units Intravenous After Dialysis Sudhir Parada MD      folic acid  8,010 mcg Oral Daily Sudhir Parada MD      heparin (porcine)  5,000 Units Subcutaneous [facility] Sudhir Parada MD      HYDROmorphone  0 5 mg Intravenous Q4H PRN Milton Del Real MD      insulin lispro  1-5 Units Subcutaneous TID [facility] Sudhir Parada MD      insulin lispro  1-5 Units Subcutaneous HS Sudhir Parada MD      lenalidomide  2 5 mg Oral Daily Sudhir Parada MD      levothyroxine  125 mcg Oral Early Morning Sudhir Parada MD      ondansetron  4 mg Intravenous Q6H PRN Sudhir Parada MD      oxyCODONE  2 5 mg Oral Q6H PRN Nilo Graham Lilly MD      pantoprazole  40 mg Intravenous Q12H [address] Eileen Meeks MD      pravastatin  80 mg Oral Daily Wang Trimble MD      predniSONE  5 mg Oral Daily Wang Trimble MD      rOPINIRole  0 25 mg Oral BID Wang Trimble MD      sertraline  200 mg Oral Daily Wang Trimble MD      sevelamer  800 mg Oral TID With Meals Wang Trimble MD      sodium bicarbonate  1,300 mg Oral BID after meals Wang Trimble MD      tacrolimus  1 mg Oral HS Kurt Birch PA-C      tacrolimus  2 mg Oral Daily Kurt Birch PA-C          Today, Patient Was Seen By: Iqra Kemp MD    ** Please Note: Dictation voice to text software may have been used in the creation of this document   **

## 2020-09-21 NOTE — ASSESSMENT & PLAN NOTE
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

## 2020-09-21 NOTE — CONSULTS
Vascular Surgery    Consult- Ravindra Regalado 1965, 63 y o  female MRN: [id number]    Unit/Bed#: [facility] 531-01 Encounter: [id number]    Primary Care Provider: Jonatan Whitney MD   Date and time admitted to hospital: 9/16/2020  9:34 AM      Inpatient consult to Vascular Surgery  Consult performed by: Palomo Andino PA-C  Consult ordered by: Oxana Kirby MD        Abdominal pain  Assessment & Plan  64 y/o F with PMHx significant for type 1 DM, failed renal/pancreatic transplant on tacrolimus and prednisone, ESRD on HD via R chest wall permacath, multiple myeloma stage 3, severe esophagitis w/ ulceration, recurrent UTI/pyelonephritis, HTN, HLD, L TMA and R Hallux amp, and cholecystectomy who presents with intractable nausea, vomiting, diarrhea, weakness and confusion after missing several dialysis treatments due to illness  She c/o constant diffuse abdominal pain since the day of admission, she denies postprandial pain, food fear or weight loss prior to admission  EGD 9/19-Esophagitis with mucosal breaks measuring 5 mm or more, continuous between folds, covering 75% or more of the circumference in the lower third of the esophagus  Multiple large, cratered ulcers in the esophagus and duodenum; performed 3 cold biopsies  Dusky appearance of the gastric antrum with clear demarcation  However, apart from erosions no clear ulcers were seen   Biopsies were done  Otherwise mild erosive gastritis in the gastric body  Biopsies were done  Multiple large, superficial ulcers in the duodenal bulb and 2nd part of the duodenum with no hemorrhage  There was an eschar on the ulcers  No bleeding or stigmata were seen  Normal distal duodenum  CTA 9/20-Hepatic and splenic arteries arise directly from the aortic arch without stenosis  Patent superior mesenteric artery without proximal stenosis  Plaque at the origin of the inferior mesenteric artery without significant stenosis   Extensive atherosclerotic plaque in the infrarenal abdominal aorta, bifurcation and common iliac artery origins resulting in mild stenosis  Patent left pelvic renal artery without stenosis  Persistent left pelvic perinephric fat stranding suggesting congestive change  No obstructive uropathy  Pyelonephritis not excluded  Interval resolution of duodenal wall thickening and  only mild residual wall thickening in the right colon, suggesting resolving inflammatory or infectious process or vascular congestion/volume overload  Plan: Will review CTA imaging with attending for final recommendations  Hepatic artery, splenic artery, SMA and MILES widely patent by CTA, infrarenal Ao and iliac atherosclerosis asymptomatic w/ palpable distal pulses and well healed prior amputations  Continue ASA and statin  Continue diet as tolerated  Continued GI/Medical evaluations/treatment for Esophagitis, esophageal ulcer and duodenal ulcer disease  ESRD (end stage renal disease) ([facility] [address] )  Assessment & Plan  ESRD on HD as above via Right chest wall permacath S/P Right Brachiocephalic AVF created 6/48/70 (Doctor)  +Thrill/Bruit    Plan:  Possible need for superficialization as previously documented   Will evaluate w/ attending      Consulting Service: SLIM    Chief Complaint: Diffuse abdominal pain    HPI: Yamilet Apodaca is a 63 y o  female with past medical history significant for type 1 DM, failed renal/pancreatic transplant on Tacker limits and prednisone, ESRD on HD via right chest wall PermCath, multiple myeloma stage III, severe esophagitis with ulceration, recurrent UTI/pyelonephritis, HTN, dyslipidemia, history of left TMA and right hallux amputation and cholecystectomy who presents with intractable nausea, vomiting, diarrhea, weakness and confusion after missing at least two dialysis treatments due to illness  Since admission there is concern for recurrent UTI and pyelonephritis for which she is receiving antibiotic therapy    Due to gastrointestinal symptoms she is being followed by Gastroenterology and underwent EGD on 09/19  This study revealed significant esophagitis with multiple large crater ulcers in the esophagus and duodenum, dusky appearance of the gastric antrum with clear demarcation however no ulcers were seen, mild erosive gastritis and multiple large superficial ulcers in the duodenal bulb and 2nd part of the duodenum  Due to the appearance of these ulcers risk concern for ischemia as a cause  As result yesterday CTA of the abdomen and pelvis was obtained and revealed patent hepatic, splenic, SMA and MILES arteries  There is extensive atherosclerotic plaque in the infrarenal abdominal aorta, bifurcation and common iliac artery origins resulting in mild stenosis  Vascular surgery is consulted due to concern for ischemia  On discussion with the patient she complains of constant diffuse abdominal pain since the day of admission  She denies any abdominal pain prior to admission  She denies any history of postprandial pain, food fear or weight loss prior to admission  She did have nausea, vomiting, diarrhea and weakness prior to admission  She continues to complain of nausea  She states she is unable to eat due to the constant abdominal pain  She denies any claudication or rest pain in lower extremities  She has history of right hallux amputation and TMA which are well healed  She follows with Dr Susan Stevenson Doctor in the vascular Center she recently had right brachiocephalic AV fistula created 7/13/20  An AV fistula duplex was performed in August and there is some concern that the fistula may need to be superficial lysed  The patient continues to dialyzed via right chest wall PermCath  Surgical incisions from AV fistula creation are well-healed, the patient denies right hand pain or numbness and thrill and bruit are present at the site of AV fistula      Review of Systems:  General: positive for  - fatigue and malaise  negative for - chills, fever, weight gain or weight loss  Cardiovascular: no chest pain or dyspnea on exertion  Respiratory: no cough, shortness of breath, or wheezing  Gastrointestinal: positive for - abdominal pain, appetite loss, diarrhea, nausea/vomiting and swallowing difficulty/pain  negative for - blood in stools, constipation, gas/bloating, hematemesis or melena  Genitourinary ROS: no dysuria, trouble voiding, or hematuria  Musculoskeletal ROS: negative  Neurological ROS: no TIA or stroke symptoms  Hematological and Lymphatic ROS: positive for - multiple myeloma  negative for - bleeding problems, blood clots or weight loss  Dermatological ROS: negative  Psychological ROS: negative  Ophthalmic ROS: negative  ENT ROS: negative    Past Medical History:  Past Medical History:   Diagnosis Date    Abnormal liver function test     Acute kidney injury ([facility] [address] )     Acute on chronic congestive heart failure (HCC)     Allergic urticaria     Anemia     Cancer (HCC)     Multiple myeloma    Cervical dysplasia     Cholelithiasis     Chronic diastolic (congestive) heart failure ([facility] [address] ) 9/18/2017    Diabetes mellitus (HCC)     Previous, controlled with diet    Diabetes mellitus with foot ulcer ([facility] [address] )     Disease of thyroid gland     Encephalopathy     Hematuria     + leak est- secondary to UTIs/panc drainage    History of transfusion     Hyperkalemia     Hypertension     Iliotibial band syndrome     Lumbar radiculopathy     Multiple myeloma (HCC)     Multiple myeloma (HCC)     Night blindness     Nonrheumatic aortic (valve) insufficiency     Pneumonia     Renal disorder     Retinopathy     Seborrhea     Seizure ([address] )     Shingles     Sinus tachycardia     B blocker - cardio echo stress test 02 normal/neg LE doppler 2/02 OK and 12/07    Status post simultaneous kidney and pancreas transplant ([facility] [address] )     Toe amputation status     Trochanteric bursitis        Past Surgical History:  Past Surgical History:   Procedure Laterality Date  CATARACT EXTRACTION      CHOLECYSTECTOMY      COLONOSCOPY      two polyps in the rectum removed and biopsied diverticulosis in the sigmoid colon, external hemorrhoiods- Dr Barfield    COMBINED KIDNEY-PANCREAS TRANSPLANT N/A     CT BONE MARROW BIOPSY AND ASPIRATION  2019    CYSTOSCOPY N/A 10/13/2016    Procedure: CYSTOSCOPY, retrograde pyelogram, biopsy of ureteral polyp; Surgeon: Omar Gates MD;  Location: [facility];  Service:    [facility] DILATION AND CURETTAGE OF UTERUS      ESOPHAGOGASTRODUODENOSCOPY N/A 2017    Procedure: ESOPHAGOGASTRODUODENOSCOPY (EGD); Surgeon: Yamilet Mensah DO;  Location: [facility]; Service: Gastroenterology    EYE SURGERY      cataracts    FOOT AMPUTATION THROUGH METATARSAL Left     FOOT SURGERY Right     excision of metatarsal heads    HALLUX VALGUS CORRECTION Right     IR TUNNELED DIALYSIS CATHETER PLACEMENT  2020    NEPHRECTOMY TRANSPLANTED ORGAN      PANCREATIC TRANSPLANT REMOVAL      ND ANASTOMOSIS,AV,ANY SITE Right 2020    Procedure: Creation of right brachiocephalic fistula; Surgeon: Florencia Dunn MD;  Location: [facility];  Service: Vascular       Social History:  Social History     Substance and Sexual Activity   Alcohol Use Never    Frequency: Never    Binge frequency: Never     Social History     Substance and Sexual Activity   Drug Use Never    Types: Hydrocodone    Comment: Past cocaine use many years ago - no current use     Social History     Tobacco Use   Smoking Status Former Smoker    Last attempt to quit: 2012    Years since quittin 4   Smokeless Tobacco Never Used       Family History:  Family History   Problem Relation Age of Onset    Hypertension Mother     Cancer Mother     Hypertension Father     Cancer Father     Cancer Maternal Grandfather     Cancer Paternal Grandmother     Cancer Paternal Grandfather     Depression Sister     Breast cancer Maternal Grandmother 66       Allergies:   Allergies Allergen Reactions    Ixazomib Rash     Ninlaro    Revlimid [Lenalidomide] Rash    Cefadroxil      Tolerated cefepime 2019    Morphine Other (See Comments)     Other reaction(s): Other (See Comments)  projectile vomiting    Morphine And Related GI Intolerance    Myrbetriq [Mirabegron] Hives    Penicillins Hives     Other reaction(s):  Other (See Comments)  Respiratory Distress,hives  Tolerates cefazolin       Medications:  Current Facility-Administered Medications   Medication Dose Route Frequency    acetaminophen (TYLENOL) tablet 650 mg  650 mg Oral Q6H PRN    al mag oxide-diphenhydramine-lidocaine viscous (MAGIC MOUTHWASH) suspension 10 mL  10 mL Swish & Swallow Q6H [address]    ARIPiprazole (ABILIFY) tablet 30 mg  30 mg Oral HS    aspirin (ECOTRIN LOW STRENGTH) EC tablet 81 mg  81 mg Oral Daily    busPIRone (BUSPAR) tablet 10 mg  10 mg Oral BID    cefepime (MAXIPIME) 1,000 mg in dextrose 5 % 50 mL IVPB  1,000 mg Intravenous Q24H    dextrose 5 % infusion  50 mL/hr Intravenous Continuous    doxercalciferol (HECTOROL) injection 1 mcg  1 mcg Intravenous After Dialysis    epoetin rebecca (EPOGEN,PROCRIT) injection 8,000 Units  8,000 Units Intravenous After Dialysis    folic acid (FOLVITE) tablet 1,000 mcg  1,000 mcg Oral Daily    heparin (porcine) subcutaneous injection 5,000 Units  5,000 Units Subcutaneous Q8H [address]    HYDROmorphone (DILAUDID) injection 0 5 mg  0 5 mg Intravenous Q4H PRN    insulin lispro (HumaLOG) 100 units/mL subcutaneous injection 1-5 Units  1-5 Units Subcutaneous TID AC    insulin lispro (HumaLOG) 100 units/mL subcutaneous injection 1-5 Units  1-5 Units Subcutaneous HS    lenalidomide (REVLIMID) capsule 2 5 mg  2 5 mg Oral Daily    levothyroxine tablet 125 mcg  125 mcg Oral Early Morning    ondansetron (ZOFRAN) injection 4 mg  4 mg Intravenous Q6H PRN    oxyCODONE (ROXICODONE) IR tablet 2 5 mg  2 5 mg Oral Q6H PRN    pantoprazole (PROTONIX) injection 40 mg  40 mg Intravenous Q12H [address]    pravastatin (PRAVACHOL) tablet 80 mg  80 mg Oral Daily    predniSONE tablet 5 mg  5 mg Oral Daily    rOPINIRole (REQUIP) tablet 0 25 mg  0 25 mg Oral BID    sertraline (ZOLOFT) tablet 200 mg  200 mg Oral Daily    sevelamer (RENAGEL) tablet 800 mg  800 mg Oral TID With Meals    sodium bicarbonate tablet 1,300 mg  1,300 mg Oral BID after meals    tacrolimus (PROGRAF) capsule 1 mg  1 mg Oral HS    tacrolimus (PROGRAF) capsule 2 mg  2 mg Oral Daily       Vitals:  /61   Pulse 82   Temp 98 2 °F (36 8 °C)   Resp 17   Ht 5' 7" (1 702 m)   Wt 91 6 kg (201 lb 15 1 oz)   LMP  (LMP Unknown)   SpO2 100%   BMI 31 63 kg/m²     I/Os:  I/O last 24 hours: In: 280 [P O :230; I V :50]  Out: 225 [Urine:225]    Lab Results and Cultures:   Lab Results   Component Value Date    WBC 7 46 09/18/2020    HGB 8 9 (L) 09/18/2020    HCT 27 7 (L) 09/18/2020     (H) 09/18/2020     09/18/2020     Lab Results   Component Value Date    GLUCOSE 103 09/13/2017    CALCIUM 7 7 (L) 09/19/2020     (L) 01/06/2016    K 4 5 09/19/2020    CO2 16 (L) 09/19/2020     09/19/2020    BUN 65 (H) 09/19/2020    CREATININE 8 25 (H) 09/19/2020     Lab Results   Component Value Date    INR 1 25 (H) 09/16/2020    INR 1 14 07/01/2020    INR 1 28 (H) 05/14/2020    PROTIME 15 7 (H) 09/16/2020    PROTIME 14 6 (H) 07/01/2020    PROTIME 15 3 (H) 05/14/2020       Lipid Panel:   Lab Results   Component Value Date    CHOL 171 08/10/2015   ,     Blood Culture:   Lab Results   Component Value Date    BLOODCX No Growth After 4 Days   09/16/2020   ,   Urinalysis:   Lab Results   Component Value Date    COLORU see chart 09/16/2020    COLORU Yellow 09/16/2020    COLORU Yellow 07/29/2016    CLARITYU Cloudy 09/16/2020    CLARITYU Cloudy 07/29/2016    SPECGRAV 1 015 09/16/2020    SPECGRAV 1 010 07/29/2016    PHUR >=9 0 (H) 09/16/2020    PHUR 7 5 07/29/2016    LEUKOCYTESUR Large (A) 09/16/2020    LEUKOCYTESUR 3+ 07/29/2016    NITRITE Negative 09/16/2020    NITRITE NEG 07/29/2016    PROTEINUA 1+ 07/29/2016    GLUCOSEU Negative 09/16/2020    GLUCOSEU Negative 07/20/2015    KETONESU Negative 09/16/2020    KETONESU NEG 07/29/2016    BILIRUBINUR Negative 09/16/2020    BILIRUBINUR NEG 07/29/2016    BLOODU Trace (A) 09/16/2020    BLOODU NEG 07/29/2016   ,   Urine Culture:   Lab Results   Component Value Date    URINECX >100,000 cfu/ml Escherichia coli (A) 09/16/2020   ,   Wound Culure: No results found for: WOUNDCULT    Imaging:  See A&P above   CTA reviewed     Physical Exam:    General appearance: alert and oriented, in no acute distress  Skin: Skin color, texture, turgor normal  No rashes or lesions  Neurologic: Mental status: Alert, oriented, thought content appropriate  Cranial nerves: normal  Sensory: normal  Motor: grossly normal  Head: Normocephalic, without obvious abnormality, atraumatic  Eyes: conjunctivae/corneas clear  PERRL, EOM's intact  Fundi benign  Throat: lips, mucosa, and tongue normal; teeth and gums normal  Neck: no adenopathy, no carotid bruit, no JVD, supple, symmetrical, trachea midline and thyroid not enlarged, symmetric, no tenderness/mass/nodules  Back: symmetric, no curvature  ROM normal  No CVA tenderness  Lungs: clear to auscultation bilaterally  Chest wall: no tenderness, Right permacath  Heart: irregularly irregular rhythm and S1, S2 normal  Abdomen: +BS, Soft, TTP diffusely to deep palpation, no rebound or guarding  Extremities: extremities normal, warm and well-perfused; no cyanosis, clubbing, or edema and s/p R hallux anp and left TMA well healed  R arm AVF +thrill/bruit      Pulse exam:  Radial: Right: 2+ Left[de-identified] 2+  Ulnar: Right: 2+ Left[de-identified] 2+  Femoral: Right: 2+ Left: 2+  DP: Right: 2+ Left: 2+    Aldo Polk PA-C  9/21/2020

## 2020-09-21 NOTE — ASSESSMENT & PLAN NOTE
ESRD on HD as above via Right chest wall permacath S/P Right Brachiocephalic AVF created 1/95/31 (Doctor)  +Thrill/Bruit    Plan:  Possible need for superficialization as previously documented   Will evaluate w/ attending

## 2020-09-21 NOTE — PLAN OF CARE
Problem: OCCUPATIONAL THERAPY ADULT  Goal: Performs self-care activities at highest level of function for planned discharge setting  See evaluation for individualized goals  Description: Treatment Interventions: ADL retraining, Functional transfer training, UE strengthening/ROM, Endurance training, Cognitive reorientation, Patient/family training, Equipment evaluation/education, Fine motor coordination activities, Compensatory technique education, Energy conservation, Activityengagement          See flowsheet documentation for full assessment, interventions and recommendations  Outcome: Progressing  Note: Limitation: Decreased ADL status, Decreased UE strength, Decreased cognition, Decreased Safe judgement during ADL, Decreased endurance, Decreased fine motor control, Decreased high-level ADLs  Prognosis: Fair  Assessment: Patient participated in Skilled OT session this date with interventions consisting of ADL re training with the use of correct body mechanics, deep breathing technique and  therapeutic activities to: increase activity tolerance   Patient agreeable to OT treatment session, upon arrival patient was found seated OOB to Chair  In comparison to previous session, patient with improvements in activity tolerance  Patient requiring frequent rest periods and ocassional safety reminders  Patient continues to be functioning below baseline level, occupational performance remains limited secondary to factors listed above and increased risk for falls and injury  From OT standpoint, recommendation at time of d/c would be Short Term Rehab  Patient to benefit from continued Occupational Therapy treatment while in the hospital to address deficits as defined above and maximize level of functional independence with ADLs and functional mobility        OT Discharge Recommendation: Post-Acute Rehabilitation Services  OT - OK to Discharge: Yes(When medically appropriate to rehab, no to home alone)

## 2020-09-21 NOTE — ASSESSMENT & PLAN NOTE
64 y/o F with PMHx significant for type 1 DM, failed renal/pancreatic transplant on tacrolimus and prednisone, ESRD on HD via R chest wall permacath, multiple myeloma stage 3, severe esophagitis w/ ulceration, recurrent UTI/pyelonephritis, HTN, HLD, L TMA and R Hallux amp, and cholecystectomy who presents with intractable nausea, vomiting, diarrhea, weakness and confusion after missing several dialysis treatments due to illness  She c/o constant diffuse abdominal pain since the day of admission, she denies postprandial pain, food fear or weight loss prior to admission  EGD 9/19-Esophagitis with mucosal breaks measuring 5 mm or more, continuous between folds, covering 75% or more of the circumference in the lower third of the esophagus  Multiple large, cratered ulcers in the esophagus and duodenum; performed 3 cold biopsies  Dusky appearance of the gastric antrum with clear demarcation  However, apart from erosions no clear ulcers were seen   Biopsies were done  Otherwise mild erosive gastritis in the gastric body  Biopsies were done  Multiple large, superficial ulcers in the duodenal bulb and 2nd part of the duodenum with no hemorrhage  There was an eschar on the ulcers  No bleeding or stigmata were seen  Normal distal duodenum  CTA 9/20-Hepatic and splenic arteries arise directly from the aortic arch without stenosis  Patent superior mesenteric artery without proximal stenosis  Plaque at the origin of the inferior mesenteric artery without significant stenosis  Extensive atherosclerotic plaque in the infrarenal abdominal aorta, bifurcation and common iliac artery origins resulting in mild stenosis  Patent left pelvic renal artery without stenosis  Persistent left pelvic perinephric fat stranding suggesting congestive change  No obstructive uropathy  Pyelonephritis not excluded   Interval resolution of duodenal wall thickening and  only mild residual wall thickening in the right colon, suggesting resolving inflammatory or infectious process or vascular congestion/volume overload  Plan: Will review CTA imaging with attending for final recommendations  Hepatic artery, splenic artery, SMA and MILES widely patent by CTA, infrarenal Ao and iliac atherosclerosis asymptomatic w/ palpable distal pulses and well healed prior amputations  Continue ASA and statin  Continue diet as tolerated  Continued GI/Medical evaluations/treatment for Esophagitis, esophageal ulcer and duodenal ulcer disease

## 2020-09-21 NOTE — PLAN OF CARE
Problem: PHYSICAL THERAPY ADULT  Goal: Performs mobility at highest level of function for planned discharge setting  See evaluation for individualized goals  Description: Treatment/Interventions: Functional transfer training, LE strengthening/ROM, Elevations, Therapeutic exercise, Endurance training, Patient/family training, Equipment eval/education, Bed mobility, Gait training, Spoke to nursing  Equipment Recommended: Nadeen Segal       See flowsheet documentation for full assessment, interventions and recommendations  Note: Prognosis: Good  Problem List: Decreased strength, Decreased endurance, Impaired balance, Decreased mobility, Decreased safety awareness, Pain  Assessment: Pt seen for high complexity PT evaluation due to decrease in functional mobility status compared to baseline  Pt with active PT eval/treat orders at this time  Pt is a 63 y o  yo F who presented to [facility] with UTI on 9/16/20  Pt  has a past medical history of Abnormal liver function test, Acute kidney injury ([address] ), Acute on chronic congestive heart failure ([address] ), Allergic urticaria, Anemia, Cancer ([address] ), Cervical dysplasia, Cholelithiasis, Chronic diastolic (congestive) heart failure ([address] ) (9/18/2017), Diabetes mellitus ([address] ), Diabetes mellitus with foot ulcer ([address] ), Disease of thyroid gland, Encephalopathy, Hematuria, History of transfusion, Hyperkalemia, Hypertension, Iliotibial band syndrome, Lumbar radiculopathy, Multiple myeloma ([address] ), Multiple myeloma ([address] ), Night blindness, Nonrheumatic aortic (valve) insufficiency, Pneumonia, Renal disorder, Retinopathy, Seborrhea, Seizure ([address] ), Shingles, Sinus tachycardia, Status post simultaneous kidney and pancreas transplant ([address] ), Toe amputation status, and Trochanteric bursitis  Pt resides alone in apartment with 4STE  Pt presents with decreased strength, balance, endurance that contribute to limitations in bed mobility, functional transfers, functional mobility    Pt requires Min A for STS and ambulation with RW at this time  Pt left upright in bedside chair with chair alarm intact and with all needs in reach  Pt will benefit from skilled therapy in order to address current impairments and functional limitations  PT to follow pt and recommending rehab once medically cleared  Barriers to Discharge: Decreased caregiver support, Inaccessible home environment     PT Discharge Recommendation: [address]     PT - OK to Discharge: Yes(to rehab once medically cleared)    See flowsheet documentation for full assessment

## 2020-09-21 NOTE — ASSESSMENT & PLAN NOTE
Lab Results   Component Value Date    HGBA1C 5 2 08/21/2020       Recent Labs     09/20/20  2110 09/20/20  2239 09/21/20  0657 09/21/20  1136   POCGLU 83 116 103 111       Blood Sugar Average: Last 72 hrs:  (P) 96 5625     On Toujeo 6 units hs and Humalog sliding scale as outpatient  Lantus 3 hs, SSI  Monitor blood sugars

## 2020-09-21 NOTE — ASSESSMENT & PLAN NOTE
Intractable nausea abdominal pain  Discussed with GI status post EGD noted to have ulcers in the esophagus, duodenum and dusky appearance of antrum concerning for ischemia  CTA abdomen noted, vascular surgery consult  GI following  Vascular surgery input noted  Continue pantoprazole, antiemetics supportive care

## 2020-09-21 NOTE — ASSESSMENT & PLAN NOTE
Possible urine tract infection  Abdominal imaging concerning for pyelonephritis  Continue IV cefepime complete 10 day course of antibiotics, transition to p o   Keflex based on urine sensitivities with improving abdominal pain and nausea  Urine culture sensitivity noted

## 2020-09-21 NOTE — PROGRESS NOTES
Progress Note- Regina Lincoln 63 y o  female MRN: [id number]  Unit/Bed#: [facility] 531-01 Encounter: [id number]    Assessment and Plan: Regina Lincoln is a 63 y o  female with PMH:  End-stage renal disease on HD, multiple myeloma, renal transplant on tacrolimus and prednisone, failed pancreatic transplant, severe esophagitis who presented for intractable nausea abdominal pain and abnormal CT scan showing esophageal thickening  Who underwent EGD on 09/20/2020 found to have LA class D esophagitis, multiple esophageal ulcers, large crater duodenal ulcers all suspected in the setting of possible ischemic injury  #LA Grade D Esophagitis  #Esophageal Ulcers  Patient underwent upper endoscopy on 09/20/2020 and was found have LA class D esophagitis with multiple esophageal ulcers  Biopsies were taken at that time rule out infectious causes in the setting of immunocompromised state  She was also noted to have moderate gastritis with dusky appearance concerning for ischemia  Also noted to have multiple large crater duodenal ulcers with black eschars in the 1st and 2nd part of the duodenum, likely in the setting of ischemia as well  Patient then underwent CTA of the abdomen which showed extensive atherosclerotic plaque in the infrarenal abdominal aorta bifurcation and common iliac origins resulting in mild stenosis  Was also noted to have some 8 interval resolution of the duodenal wall thickening and only mild residual wall thickening in the right colon  Plan:  -monitor CBC  -continue IV protonix 40mg BID  -stopping carafate 1g QID given aluminum in setting of ESRD  -magic mouthwash 10mL q6hr PRN changed to standing  -avoid NSAIDs  -maintain appropriate blood pressure  -repeat EGD in 8-12 weeks    #Large Duodenal Ulcers  Patient underwent CTA did show evidence extensive atherosclerotic plaque causing mild luminal narrowing     Suspect her ulcers are the setting of ischemic insult in the setting complicated anatomical history with previous pancreatic transplant, recently started dialysis  Her lactic acid remains normal at this time  Plan:  -recommend vascular surgery consult, will follow-up recommendations  -can consider repeat EGD in 8-12 weeks to ensure healing  -f/u gastrin levels    #Diarrhea  #Hx of pancreatic transplant  Patient with noted history of pancreatic transplant over 20 years ago which has now failed  She is on insulin therapy for diabetes  However given that she has now developed some interval diarrhea, would check for stool studies to rule out exocrine pancreas insufficiency  -f/u fecal elastase    ______________________________________________________________________    Subjective:     Patient states she continues have abdominal discomfort  States that her abdominal pain has been severe for the past few days  Denies any blood in her stool  No emesis/hematemesis       Medication Administration - last 24 hours from 09/20/2020 0916 to 09/21/2020 7818       Date/Time Order Dose Route Action Action by     09/20/2020 2103 ARIPiprazole (ABILIFY) tablet 30 mg 30 mg Oral Given Christiana Russo     09/21/2020 0813 aspirin (ECOTRIN LOW STRENGTH) EC tablet 81 mg 81 mg Oral Given Pearl Sandoval RN     09/21/2020 0804 busPIRone (BUSPAR) tablet 10 mg 10 mg Oral Given Pearl Sandoval RN     09/20/2020 1713 busPIRone (BUSPAR) tablet 10 mg 10 mg Oral Given Elisabeth Webber RN     23/48/9917 4597 folic acid (FOLVITE) tablet 1,000 mcg 1,000 mcg Oral Given Pearl Sandoval RN     09/20/2020 7780 insulin glargine (LANTUS) subcutaneous injection 3 Units 0 03 mL 3 Units Subcutaneous Not Given Christiana Russo     09/21/2020 9772 lenalidomide (REVLIMID) capsule 2 5 mg 2 5 mg Oral Given Pearl Sandoval RN     09/21/2020 0545 levothyroxine tablet 125 mcg 125 mcg Oral Given Christiana Russo     09/21/2020 0803 pravastatin (PRAVACHOL) tablet 80 mg 80 mg Oral Given Pearl Sandoval RN     09/21/2020 0806 predniSONE tablet 5 mg 5 mg Oral Given [facility] Natalie Townsend, RN     09/21/2020 2025 rOPINIRole (REQUIP) tablet 0 25 mg 0 25 mg Oral Given Pearl Sandoval, RN     09/20/2020 1713 rOPINIRole (REQUIP) tablet 0 25 mg 0 25 mg Oral Given Elisabeth Webber, RN     09/21/2020 0805 sertraline (ZOLOFT) tablet 200 mg 200 mg Oral Given Pearl Sandoval, RN     09/21/2020 0546 heparin (porcine) subcutaneous injection 5,000 Units 5,000 Units Subcutaneous Given Christiana Russo     09/20/2020 2301 heparin (porcine) subcutaneous injection 5,000 Units 5,000 Units Subcutaneous Not Given Christiana [facility]     09/20/2020 1416 heparin (porcine) subcutaneous injection 5,000 Units 5,000 Units Subcutaneous Given Elisabeth Webber, RN     09/21/2020 0759 insulin lispro (HumaLOG) 100 units/mL subcutaneous injection 1-5 Units 1 Units Subcutaneous Not Given Pearl Sandoval, RN     09/20/2020 1653 insulin lispro (HumaLOG) 100 units/mL subcutaneous injection 1-5 Units 1 Units Subcutaneous Not Given Elisabeth Webber, RN     09/20/2020 1051 insulin lispro (HumaLOG) 100 units/mL subcutaneous injection 1-5 Units 1 Units Subcutaneous Not Given Elisabeth Webber, RN     09/20/2020 2259 insulin lispro (HumaLOG) 100 units/mL subcutaneous injection 1-5 Units 1 Units Subcutaneous Not Given Christiana Garciaman     09/21/2020 0803 sodium bicarbonate tablet 1,300 mg 1,300 mg Oral Given Pearl Sandoval, RN     09/20/2020 1713 sodium bicarbonate tablet 1,300 mg 1,300 mg Oral Given Elisabeth Webber, RN     09/21/2020 1170 sevelamer (RENAGEL) tablet 800 mg 800 mg Oral Given Pearl Sandoval, RN     09/20/2020 1713 sevelamer (RENAGEL) tablet 800 mg 800 mg Oral Given Elisabeth Webber, RN     09/20/2020 1233 sevelamer (RENAGEL) tablet 800 mg 800 mg Oral Not Given Elisabeth Webber, RN     09/20/2020 1416 cefepime (MAXIPIME) 1,000 mg in dextrose 5 % 50 mL IVPB 1,000 mg Intravenous New Bag Elisabeth Webber, RN     09/21/2020 7059 tacrolimus (PROGRAF) capsule 2 mg 2 mg Oral Given Pearl Sandoval RN     09/20/2020 1987 tacrolimus (PROGRAF) capsule 1 mg 1 mg Oral Given [facility]     09/21/2020 0804 oxyCODONE (ROXICODONE) IR tablet 2 5 mg 2 5 mg Oral Given Yady Graf RN     09/20/2020 2052 oxyCODONE (ROXICODONE) IR tablet 2 5 mg 2 5 mg Oral Given [facility]     09/20/2020 1243 oxyCODONE (ROXICODONE) IR tablet 2 5 mg 2 5 mg Oral Given Jayesh Amaro RN     09/20/2020 1243 dextrose 5 % infusion 50 mL/hr Intravenous Rate/Dose Change Jayesh Amaro RN     09/20/2020 1144 dextrose 5 % infusion   Intravenous Anesthesia Volume Adjustment Gavi LanceMERCY hood     09/20/2020 1120 dextrose 5 % infusion   Intravenous Continue from Digna Mcaiel CRNA     09/20/2020 2254 HYDROmorphone (DILAUDID) injection 0 5 mg 0 5 mg Intravenous Given [facility]     09/20/2020 1416 HYDROmorphone (DILAUDID) injection 0 5 mg 0 5 mg Intravenous Given Jayesh Amaro RN     09/20/2020 1337 iohexol (OMNIPAQUE) 350 MG/ML injection (SINGLE-DOSE) 100 mL 100 mL Intravenous Given Henny Joy     09/21/2020 0700 sucralfate (CARAFATE) tablet 1 g   Oral Canceled Entry [facility]     09/21/2020 0545 sucralfate (CARAFATE) tablet 1 g 1 g Oral Given [facility]     09/20/2020 2257 sucralfate (CARAFATE) tablet 1 g 1 g Oral Given [facility]     09/20/2020 1713 sucralfate (CARAFATE) tablet 1 g 1 g Oral Given Jayesh Amaro RN     09/21/2020 7910 al mag oxide-diphenhydramine-lidocaine viscous (MAGIC MOUTHWASH) suspension 10 mL 10 mL Swish & Swallow Given [facility]     09/20/2020 2300 al mag oxide-diphenhydramine-lidocaine viscous (MAGIC MOUTHWASH) suspension 10 mL 10 mL Swish & Swallow Given [facility]     09/20/2020 2102 al mag oxide-diphenhydramine-lidocaine viscous (MAGIC MOUTHWASH) suspension 10 mL 10 mL Swish & Swallow Given [facility]     09/21/2020 0803 pantoprazole (PROTONIX) injection 40 mg 40 mg Intravenous Given Yady Graf RN     09/20/2020 1713 pantoprazole (PROTONIX) injection 40 mg 40 mg Intravenous Given Jayesh Amaro RN          Objective:     Vitals: Blood pressure 168/61, pulse 82, temperature 98 2 °F (36 8 °C), resp  rate 17, height 5' 7" (1 702 m), weight 91 6 kg (201 lb 15 1 oz), SpO2 100 %  ,Body mass index is 31 63 kg/m²  Intake/Output Summary (Last 24 hours) at 9/21/2020 0916  Last data filed at 9/21/2020 0900  Gross per 24 hour   Intake 280 ml   Output 225 ml   Net 55 ml       Physical Exam:   General Appearance:    mild discomfort lying in bed   HEENT:   Normocephalic, atraumatic, anicteric  Neck:  Supple, symmetrical, trachea midline   Lungs:   Clear to auscultation bilaterally; no rales, rhonchi or wheezing; respirations unlabored    Heart[de-identified]   Regular rate and rhythm; no murmur, rub, or gallop  Abdomen:     Soft mildly tender in the epigastric region   Genitalia:   Deferred    Rectal:   Deferred    Extremities:  No cyanosis, clubbing or edema    Pulses:  2+ and symmetric all extremities    Skin:  No jaundice, rashes, or lesions    Lymph nodes:  No palpable cervical lymphadenopathy        Invasive Devices     Peripheral Intravenous Line            Peripheral IV 09/20/20 Dorsal (posterior); Left;Proximal Forearm less than 1 day    Peripheral IV 09/20/20 Left;Ventral (anterior) Hand less than 1 day          Line            Hemodialysis AV Fistula 07/13/20 Right Upper arm 69 days          Hemodialysis Catheter            HD Permanent Double Catheter -- days                Lab Results:  No results displayed because visit has over 200 results  Imaging Studies: I have personally reviewed pertinent imaging studies        ---------------------------------------------------  Note Electronically Signed By:    Niharika Nathan MD  [facility] Gastroenterology Fellow PGY-5  [address] #: [id number]

## 2020-09-21 NOTE — OCCUPATIONAL THERAPY NOTE
[address] Progress Note     Patient Name: Alina Conroy  IUIPK'R Date: 9/21/2020  Problem List  Principal Problem:    UTI (urinary tract infection)  Active Problems:    Renal transplant recipient    Acquired hypothyroidism    Hypertension    Toxic metabolic encephalopathy    Diabetes mellitus type I, controlled ([facility] )    Abdominal pain    Intractable nausea and vomiting    Failure of pancreas transplant    FELIPE (generalized anxiety disorder)/depression    Hyperlipidemia    Multiple myeloma not having achieved remission ([facility] )    Metabolic acidosis    ESRD (end stage renal disease) ([facility] )    Hyperkalemia            09/21/20 1443   OT Last Visit   OT Visit Date 09/21/20   Restrictions/Precautions   Weight Bearing Precautions Per Order No   Braces or Orthoses Other (Comment)  (Refusing to wear orthotic shoes this session)   Other Precautions Contact/isolation;Cognitive;Multiple lines; Fall Risk;Pain   General   Response to Previous Treatment Patient with no complaints from previous session   Lifestyle   Autonomy Pt reports being IND w/ all ADLS (has been sponge bathing recently 2' difficulty accessing tub) and most IADLS; mother and father A w/ grocery shopping and transportation to/from dialysis; (+) drives PTA   Reciprocal Relationships Pt lives alone  Pt reports that she is not able to stay with her parents 2* her dad being sick and will return home alone upon d/c     Service to Others Pt is retired   Intrinsic Gratification Pt reports enjoying being home  Pain Assessment   Pain Assessment Tool 0-10   Pain Score 5   Pain Location/Orientation Location: Abdomen   Hospital Pain Intervention(s) Repositioned; Ambulation/increased activity; Emotional support   ADL   Where Assessed Chair   LB Dressing Assistance 4  Minimal Assistance   LB Dressing Deficit Setup;Supervision/safety; Don/doff R sock; Don/doff L sock   Transfers   Sit to Stand 4  Minimal assistance   Additional items Assist x 1; Increased time required Stand to Sit 4  Minimal assistance   Additional items Assist x 1; Increased time required;Verbal cues   Functional Mobility   Functional Mobility 4  Minimal assistance   Additional Comments Pt demonstrated short household mobility with RW  Additional items Rolling walker   Cognition   Overall Cognitive Status Impaired   Arousal/Participation Alert   Attention Attends with cues to redirect   Orientation Level Oriented X4   Memory Decreased recall of precautions   Following Commands Follows one step commands with increased time or repetition   Comments Pt reports that she is "loopy" this session 2* pain medication  Activity Tolerance   Activity Tolerance Patient limited by fatigue;Patient limited by pain   Medical Staff Made Aware RN confirmed okay to see pt   Assessment   Assessment Patient participated in Skilled OT session this date with interventions consisting of ADL re training with the use of correct body mechanics, deep breathing technique and  therapeutic activities to: increase activity tolerance   Patient agreeable to OT treatment session, upon arrival patient was found seated OOB to Chair  In comparison to previous session, patient with improvements in activity tolerance  Patient requiring frequent rest periods and ocassional safety reminders  Patient continues to be functioning below baseline level, occupational performance remains limited secondary to factors listed above and increased risk for falls and injury  From OT standpoint, recommendation at time of d/c would be Short Term Rehab  Patient to benefit from continued Occupational Therapy treatment while in the hospital to address deficits as defined above and maximize level of functional independence with ADLs and functional mobility  Plan   Treatment Interventions ADL retraining;Functional transfer training; Endurance training   Goal Expiration Date 09/29/20   OT Treatment Day 1   OT Frequency 3-5x/wk   Recommendation   OT Discharge Recommendation Post-Acute Rehabilitation Services   OT - OK to Discharge Yes  (When medically appropriate to rehab, no to home alone)   Modified [place] Scale   Modified [place] Scale 4     TANJA Montgomery, OTR/L

## 2020-09-22 NOTE — PROGRESS NOTES
Progress Note- Estefany Deluca 63 y o  female MRN: [id number]  Unit/Bed#: [facility] 531-01 Encounter: [id number]    Assessment and Plan: Estefany Deluca is a 63 y o  female with PMH:  End-stage renal disease on HD, multiple myeloma, renal transplant on tacrolimus and prednisone, failed pancreatic transplant, severe esophagitis who presented for intractable nausea abdominal pain and abnormal CT scan showing esophageal thickening  Who underwent EGD on 09/20/2020 found to have LA class D esophagitis, multiple esophageal ulcers, large crater duodenal ulcers all suspected in the setting of possible ischemic injury      #LA Grade D Esophagitis  #Esophageal Ulcers  Patient underwent upper endoscopy on 09/20/2020 and was found have LA class D esophagitis with multiple esophageal ulcers  Biopsies were taken at that time rule out infectious causes which are still pending at this time  If patient is found have infectious causes will treat the patient special in setting of immunosuppression  Patient continues have some nausea which I suspect is in the setting of  Esophagitis, gastritis and duodenal ulcers  Once she has improved control with PPI therapy suspect her nausea will get better       Plan:  -have scheduled repeat EGD in 8-12 weeks  -pending esophageal biopsies   -continue IV protonix 40mg BID,  If tolerates advancement of diet can switch to p o  tomorrow  -nausea control per primary team, consider adding Compazine  -magic mouthwash 10mL q6hr   -avoid NSAIDs     #Large Duodenal Ulcers  #Infrarenal abdominal aorta, common iliac atherosclerosis  Patient underwent CTA did show evidence extensive atherosclerotic plaque causing mild luminal narrowing  Suspect her ulcers are the setting of ischemic insult in the setting complicated anatomical history with previous pancreatic transplant, recently started dialysis       Gastrin level still pending       Plan:  -appreciate vascular surgery input, continue aspirin and statin for ppx  -f/u gastrin levels     ______________________________________________________________________    Subjective:     Patient is her abdominal pain has improved slightly  She is still having difficulty tolerating a solid meal but is slowly advancing on her soft diet  Continues to have nausea,  Not significantly improved with Zofran       Medication Administration - last 24 hours from 09/21/2020 0940 to 09/22/2020 0940       Date/Time Order Dose Route Action Action by     09/21/2020 2205 ARIPiprazole (ABILIFY) tablet 30 mg 30 mg Oral Given Car Kinney, MICHELLE     09/22/2020 4535 aspirin (ECOTRIN LOW STRENGTH) EC tablet 81 mg 81 mg Oral Given Toy Mercedes, MICHELLE     09/22/2020 0808 busPIRone (BUSPAR) tablet 10 mg 10 mg Oral Given Toy Mercedes, MICHELLE     09/21/2020 1758 busPIRone (BUSPAR) tablet 10 mg 10 mg Oral Given Haven Diaz, MICHELLE     66/09/9450 9363 folic acid (FOLVITE) tablet 1,000 mcg 1,000 mcg Oral Given Toy Mercedes, RN     09/22/2020 1124 lenalidomide (REVLIMID) capsule 2 5 mg 2 5 mg Oral Given Toy Mercedes, RN     09/22/2020 4667 levothyroxine tablet 125 mcg 125 mcg Oral Given Serina Ivey RN     09/22/2020 0808 pravastatin (PRAVACHOL) tablet 80 mg 80 mg Oral Given Toy Mercedes, RN     09/22/2020 9852 predniSONE tablet 5 mg 5 mg Oral Given Toy Mercedes, RN     09/22/2020 5050 rOPINIRole (REQUIP) tablet 0 25 mg 0 25 mg Oral Given Toy Mercedes, RN     09/21/2020 1800 rOPINIRole (REQUIP) tablet 0 25 mg 0 25 mg Oral Given Haven Diaz RN     09/22/2020 1877 sertraline (ZOLOFT) tablet 200 mg 200 mg Oral Given Toy Mercedes, MICHELLE     09/22/2020 0518 heparin (porcine) subcutaneous injection 5,000 Units 5,000 Units Subcutaneous Given Serina Ivey RN     09/21/2020 2206 heparin (porcine) subcutaneous injection 5,000 Units 5,000 Units Subcutaneous Given Car Kinney RN     09/21/2020 1423 heparin (porcine) subcutaneous injection 5,000 Units 5,000 Units Subcutaneous Given Nicole Argueta, RN     09/22/2020 0521 ondansetron (ZOFRAN) injection 4 mg 4 mg Intravenous Given Ishan Hendrickson, RN     09/21/2020 1754 ondansetron (ZOFRAN) injection 4 mg 4 mg Intravenous Given Nicole Argueta, RN     09/22/2020 0755 insulin lispro (HumaLOG) 100 units/mL subcutaneous injection 1-5 Units 1 Units Subcutaneous Not Given Gordon Wiseman, RN     09/21/2020 1701 insulin lispro (HumaLOG) 100 units/mL subcutaneous injection 1-5 Units 1 Units Subcutaneous Not Given Simon Hanley, RN     09/21/2020 1146 insulin lispro (HumaLOG) 100 units/mL subcutaneous injection 1-5 Units 1 Units Subcutaneous Not Given Simon Hanley, RN     09/21/2020 2206 insulin lispro (HumaLOG) 100 units/mL subcutaneous injection 1-5 Units 1 Units Subcutaneous Not Given Raye Gaucher, RN     09/22/2020 0815 sodium bicarbonate tablet 1,300 mg 1,300 mg Oral Given Gordon Wiseman, RN     09/21/2020 1759 sodium bicarbonate tablet 1,300 mg 1,300 mg Oral Given Nicole Argueta, RN     09/22/2020 6902 sevelamer (RENAGEL) tablet 800 mg 800 mg Oral Given Gordon Wiseman, RN     09/21/2020 1758 sevelamer (RENAGEL) tablet 800 mg 800 mg Oral Given Nicole Argueta, RN     09/21/2020 1156 sevelamer (RENAGEL) tablet 800 mg 800 mg Oral Given Simon Hanley, RN     09/21/2020 1508 cefepime (MAXIPIME) 1,000 mg in dextrose 5 % 50 mL IVPB 0 mg Intravenous Stopped Nicole Argueta, MICHELLE     09/21/2020 1418 cefepime (MAXIPIME) 1,000 mg in dextrose 5 % 50 mL IVPB 1,000 mg Intravenous [address] Nicole Argueta RN     09/22/2020 2170 tacrolimus (PROGRAF) capsule 2 mg 2 mg Oral Given Gordon Wiseman, RN     09/21/2020 2206 tacrolimus (PROGRAF) capsule 1 mg 1 mg Oral Given Raye Gaucher, MICHELLE     09/21/2020 1753 dextrose 5 % infusion 50 mL/hr Intravenous [address] Nicole Argueta, [facility]     09/21/2020 1156 HYDROmorphone (DILAUDID) injection 0 5 mg 0 5 mg Intravenous Given Simon Hanley RN     09/22/2020 0518 al mag oxide-diphenhydramine-lidocaine viscous (MAGIC MOUTHWASH) suspension 10 mL 10 mL Swish & Swallow Given Zandra Naranjo, RN     09/22/2020 0112 al mag oxide-diphenhydramine-lidocaine viscous (MAGIC MOUTHWASH) suspension 10 mL 10 mL Swish & Swallow Given Shirley Washington     09/21/2020 1759 al mag oxide-diphenhydramine-lidocaine viscous (MAGIC MOUTHWASH) suspension 10 mL 10 mL Swish & Swallow Given Lacho Lehman, RN     09/21/2020 1156 al mag oxide-diphenhydramine-lidocaine viscous (MAGIC MOUTHWASH) suspension 10 mL 10 mL Swish & Swallow Given Cherellemary Jas, RN     09/22/2020 8768 pantoprazole (PROTONIX) injection 40 mg 40 mg Intravenous Given Chelsea Boxer, MICHELLE     09/21/2020 2206 pantoprazole (PROTONIX) injection 40 mg 40 mg Intravenous Given Jewels Camarena RN          Objective:     Vitals: Blood pressure 135/60, pulse 80, temperature 98 °F (36 7 °C), temperature source Oral, resp  rate 18, height 5' 7" (1 702 m), weight 91 6 kg (201 lb 15 1 oz), SpO2 100 %  ,Body mass index is 31 63 kg/m²  Intake/Output Summary (Last 24 hours) at 9/22/2020 0940  Last data filed at 9/22/2020 5707  Gross per 24 hour   Intake 2560 ml   Output 550 ml   Net 2010 ml       Physical Exam:   General Appearance:   Alert, cooperative, no distress   HEENT:   Normocephalic, atraumatic, anicteric  Neck:  Supple, symmetrical, trachea midline   Lungs:   Clear to auscultation bilaterally; no rales, rhonchi or wheezing; respirations unlabored    Heart[de-identified]   Regular rate and rhythm; no murmur, rub, or gallop  Abdomen:    soft mildly tender in epigastric region   Genitalia:   Deferred    Rectal:   Deferred    Extremities:  No cyanosis, clubbing or edema    Pulses:  2+ and symmetric all extremities    Skin:  No jaundice, rashes, or lesions    Lymph nodes:  No palpable cervical lymphadenopathy        Invasive Devices     Peripheral Intravenous Line            Peripheral IV 09/20/20 Dorsal (posterior); Left;Proximal Forearm 1 day          Line            Hemodialysis AV Fistula 07/13/20 Right Upper arm 70 days          Hemodialysis Catheter            HD Permanent Double Catheter -- days                Lab Results:  No results displayed because visit has over 200 results  Imaging Studies: I have personally reviewed pertinent imaging studies        ---------------------------------------------------  Note Electronically Signed By:    MD Christi Cam 72 Gastroenterology Fellow PGY-5  [address] [facility] #: [id number]

## 2020-09-22 NOTE — PROGRESS NOTES
Progress Note - Venetta Coma 1965, 63 y o  female MRN: [id number]    Unit/Bed#: [facility] 531-01 Encounter: [id number]    Primary Care Provider: Theresa Monahan MD   Date and time admitted to hospital: 9/16/2020  9:34 AM        Renal transplant recipient  Assessment & Plan  · S/p renal-pancreatic transplant in 1998  · On HD since May, 2020  · On prednisone 5 mg daily, Tacrolimus 2 mg BID - continue  · F/u  Tacrolimus level - goal level 2-4      Hyperkalemia  Assessment & Plan  · Likely from missed HD x 2  · Has resumed HD  · Improved    ESRD (end stage renal disease) ([facility] [address] )  Assessment & Plan  · On HD on Tuesday/Thursday/Saturday  · Dialysis per nephrology  · Nephrology following    Metabolic acidosis  Assessment & Plan  · Resolved  · Monitor    Multiple myeloma not having achieved remission [facility])  Assessment & Plan  · IgG kappa MM stage III diagnosed in April 2019  · 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, 2017  · Ct Lanalodimide 2 5 mg daily  · Ct outpatient follow-up with primary oncologist Dr Johnathon Boone    Hyperlipidemia  Assessment & Plan  · Ct Pravastatin 80 mg daily    FELIPE (generalized anxiety disorder)/depression  Assessment & Plan  · As needed lorazepam held due to encephalopathy  · Continue Abilify, BuSpar, sertraline  Failure of pancreas transplant  Assessment & Plan  · S/p pancreatic/kidney transplant in 1998  · Failed pancreatic transplant in 2017    Intractable nausea and vomiting  Assessment & Plan  Intractable nausea abdominal pain  Discussed with GI status post EGD noted to have ulcers in the esophagus, duodenum and dusky appearance of antrum concerning for ischemia  CTA abdomen noted, vascular surgery consult  GI following  Vascular surgery input noted  Continue pantoprazole, antiemetics supportive care    9/22-patient still noted with limited p o   Intake; vascular surgery feels infrarenal vascular disease is not contributory to ulcers   -awaiting biopsy results; as per GI continue Magic mouth wash q 6 hours, PPI IV b i d , will add Compazine   -advance diet as tolerated; follow-up in outpatient setting      Diabetes mellitus type I, controlled [facility])  Assessment & Plan  Lab Results   Component Value Date    HGBA1C 5 2 08/21/2020       Recent Labs     09/21/20  2100 09/21/20  2122 09/22/20  0655 09/22/20  1027   POCGLU 67 80 116 103       Blood Sugar Average: Last 72 hrs:  (P) 96 1875     On Toujeo 6 units hs and Humalog sliding scale as outpatient  Lantus 3 hs, SSI  Monitor blood sugars      Toxic metabolic encephalopathy  Assessment & Plan  · Likely metabolic  · Improving  · Monitor closely    Hypertension  Assessment & Plan  · Was on Torsemide 20 mg daily, doxazosin 4 mg at bedtime, hydralazine 100 mg 3 times daily  · Monitor blood pressures  · Avoid hypotension  · 9/22-BP medications apparently held on admission; blood pressure is trending up; p r n  IV hydralazine 10 mg for SBP greater than 170  · Will consider restarting hydralazine oral if blood pressures remain high/stable overnight  Acquired hypothyroidism  Assessment & Plan  · Ct levothyroxine 125 mcg daily    * UTI (urinary tract infection)  Assessment & Plan  Possible urine tract infection  Abdominal imaging concerning for pyelonephritis  Discontinue IV cefepime; patient has completed 7 day course of antibiotics with improving abdominal pain and nausea  Urine culture sensitivity noted; white count normal patient afebrile      VTE Pharmacologic Prophylaxis:   Pharmacologic: Heparin  Mechanical VTE Prophylaxis in Place: Yes    Patient Centered Rounds: I have performed bedside rounds with nursing staff today  Discussions with Specialists or Other Care Team Provider:     Education and Discussions with Family / Patient: Care plan discussed with patient who voiced understanding and agrees with recommendations  Time Spent for Care: 30 minutes    More than 50% of total time spent on counseling and coordination of care as described above  Current Length of Stay: 6 day(s)    Current Patient Status: Inpatient   Certification Statement: The patient will continue to require additional inpatient hospital stay due to Treatment of esophageal/duodenal ulcers    Discharge Plan: To be determined    Code Status: Level 1 - Full Code      Subjective:   Patient seen examined bedside, no acute distress noted, however remains nauseous with moderate abdominal pain secondary to esophageal and duodenal ulcers  Will continue Magic mouthwash, PPI IV, added Compazine, and are waiting biopsies from EGD  Has completed 7 days of cefepime for UTI and is no longer confused  Currently undergoing HD with low-sodium; will repeat BMP for completeness sake  Patient reporting that she is able tolerate moderate p o  And her p o  Intake is increasing  Followed by Nephrology and GI; likely stable for discharge in 1-2 days  Objective:     Vitals:   Temp (24hrs), Av 4 °F (36 9 °C), Min:98 °F (36 7 °C), Max:99 °F (37 2 °C)    Temp:  [98 °F (36 7 °C)-99 °F (37 2 °C)] 99 °F (37 2 °C)  HR:  [72-96] 90  Resp:  [18] 18  BP: (120-163)/(48-85) 145/51  SpO2:  [99 %-100 %] 100 %  Body mass index is 31 63 kg/m²  Input and Output Summary (last 24 hours): Intake/Output Summary (Last 24 hours) at 2020 1622  Last data filed at 2020 1407  Gross per 24 hour   Intake 3042 5 ml   Output 950 ml   Net 2092 5 ml       Physical Exam:     Physical Exam  Vitals signs and nursing note reviewed  Constitutional:       Appearance: She is obese  HENT:      Head: Normocephalic and atraumatic  Right Ear: External ear normal       Left Ear: External ear normal       Nose: Nose normal       Mouth/Throat:      Mouth: Mucous membranes are moist    Eyes:      Extraocular Movements: Extraocular movements intact  Conjunctiva/sclera: Conjunctivae normal       Pupils: Pupils are equal, round, and reactive to light     Neck:      Musculoskeletal: Normal range of motion and neck supple  Cardiovascular:      Rate and Rhythm: Normal rate and regular rhythm  Heart sounds: Normal heart sounds  Pulmonary:      Effort: Pulmonary effort is normal       Breath sounds: Rales present  Abdominal:      Palpations: Abdomen is soft  Musculoskeletal: Normal range of motion  Skin:     General: Skin is warm and dry  Neurological:      Mental Status: She is alert and oriented to person, place, and time  Psychiatric:         Mood and Affect: Mood normal          Behavior: Behavior normal          Judgment: Judgment normal            Additional Data:     Labs:    Results from last 7 days   Lab Units 09/22/20  0933  09/17/20  1028   WBC Thousand/uL 6 94   < > 5 94   HEMOGLOBIN g/dL 7 4*   < > 8 5*   HEMATOCRIT % 22 2*   < > 25 6*   PLATELETS Thousands/uL 155   < > 167   NEUTROS PCT %  --   --  68   LYMPHS PCT %  --   --  13*   MONOS PCT %  --   --  16*   EOS PCT %  --   --  1    < > = values in this interval not displayed  Results from last 7 days   Lab Units 09/22/20  0933   SODIUM mmol/L 122*   POTASSIUM mmol/L 3 8   CHLORIDE mmol/L 87*   CO2 mmol/L 23   BUN mg/dL 50*   CREATININE mg/dL 6 34*   ANION GAP mmol/L 12   CALCIUM mg/dL 7 8*   ALBUMIN g/dL 2 7*   TOTAL BILIRUBIN mg/dL 0 36   ALK PHOS U/L 138*   ALT U/L 18   AST U/L 14   GLUCOSE RANDOM mg/dL 98     Results from last 7 days   Lab Units 09/16/20  1030   INR  1 25*     Results from last 7 days   Lab Units 09/22/20  1027 09/22/20  0655 09/21/20  2122 09/21/20  2100 09/21/20  1606 09/21/20  1136 09/21/20  0657 09/20/20  2239 09/20/20  2110 09/20/20  1638 09/20/20  1019 09/20/20  0641   POC GLUCOSE mg/dl 103 116 80 67 105 111 103 116 83 93 90 89         Results from last 7 days   Lab Units 09/20/20  1717 09/18/20  0440 09/17/20  1028 09/16/20  1030   LACTIC ACID mmol/L 0 7  --   --  1 5   PROCALCITONIN ng/ml  --  1 28* 1 63* 0 18           * I Have Reviewed All Lab Data Listed Above    * Additional Pertinent Lab Tests Reviewed: All Labs Within Last 24 Hours Reviewed    Imaging:    Imaging Reports Reviewed Today Include:  CT abdomen pelvis  Imaging Personally Reviewed by Myself Includes:      Recent Cultures (last 7 days):     Results from last 7 days   Lab Units 09/16/20  1318 09/16/20  1030 09/16/20  1009   BLOOD CULTURE   --  No Growth After 5 Days  No Growth After 5 Days     URINE CULTURE  >100,000 cfu/ml Escherichia coli*  --   --        Last 24 Hours Medication List:   Current Facility-Administered Medications   Medication Dose Route Frequency Provider Last Rate    acetaminophen  650 mg Oral Q6H PRN Zain Hdez MD      al mag oxide-diphenhydramine-lidocaine viscous  10 mL Swish & Swallow Q6H [address] Sharonda Ramirez MD      ARIPiprazole  30 mg Oral HS Zain Hdez MD      aspirin  81 mg Oral Daily Zain Hdez MD      busPIRone  10 mg Oral BID Zain Hdez MD      cefepime  1,000 mg Intravenous Q24H Zain Hdez MD Stopped (09/22/20 1407)    dextrose  50 mL/hr Intravenous Continuous Eileen Meeks MD 50 mL/hr (09/22/20 1330)    doxercalciferol  1 mcg Intravenous After Dialysis Zain Hdez MD      epoetin rebecca  8,000 Units Intravenous After Dialysis Zain Hdez MD      folic acid  2,291 mcg Oral Daily Zain Hdez MD      heparin (porcine)  5,000 Units Subcutaneous [facility] Zain Hdez MD      hydrALAZINE  10 mg Intravenous Q6H PRN Royal Arturo MD      HYDROmorphone  0 5 mg Intravenous Q4H PRN Ciro Coe MD      insulin lispro  1-5 Units Subcutaneous TID [facility] Zain Hdez MD      insulin lispro  1-5 Units Subcutaneous HS Zain Hdez MD      lenalidomide  2 5 mg Oral Daily Zain Hdez MD      levothyroxine  125 mcg Oral Early Morning Zain Hdez MD      ondansetron  4 mg Intravenous Q6H PRN Zain Hdez MD      oxyCODONE  2 5 mg Oral Q6H PRN Ciro Coe MD      pantoprazole  40 mg Intravenous Q12H [address] Eileen Meeks MD      pravastatin  80 mg Oral Daily Kasandra Lyn MD      predniSONE  5 mg Oral Daily Kasandra Lyn MD      prochlorperazine  10 mg Oral Q6H PRN Katiana Nicole MD      rOPINIRole  0 25 mg Oral BID Kasandra Lyn MD      sertraline  200 mg Oral Daily Kasandra Lyn MD      sevelamer  800 mg Oral TID With Meals Kasandra Lyn MD      sodium bicarbonate  1,300 mg Oral BID after meals Kasandra Lyn MD      tacrolimus  1 mg Oral HS Ervin Washburn PA-C      tacrolimus  2 mg Oral Daily Ervin Washburn PA-C          Today, Patient Was Seen By: Mely Shaikh MD    ** Please Note: Dictation voice to text software may have been used in the creation of this document   **

## 2020-09-22 NOTE — CASE MANAGEMENT
Discussed patient's needs in care coordination rounds  Reviewed patient's refusal to consider snf at dc  Request made for patient to go to HD in the wc rather than the bed  CM to speak with patient and her family again about care available at dc

## 2020-09-22 NOTE — HEMODIALYSIS
Post-Dialysis RN Treatment Note    Blood Pressure:  Pre 120/58 mm/Hg  Post 163/69 mmHg   EDW  87 kg    Weight:  Pre 83 9 kg   Post 83 5 kg   Mode of weight measurement: Standing Scale  AS REQUESTED BY BY DR ARMENTA,   Volume Removed  1002 ml    Treatment duration 210 minutes    NS given no   Treatment shortened?  no   Medications given during Rx epo 8000, hectoral 1 mcg   Estimated Kt/V 1 37   Access type: Permacath/TDC   Access Status: Yes, describe: lines reversed for optimal BFR    Report called to primary nurse   Ella Madrigal

## 2020-09-22 NOTE — PROGRESS NOTES
NEPHROLOGY HEMODIALYSIS PROCEDURE NOTE    Seen and examined on hemodialysis  Complains of persistent abdominal discomfort  Unfortunately not able to eat given also significant nausea  Complains of chronic chest discomfort and diffuse arthralgias  Does not appear short of breath  QB: 138  Dialyzer: 160  Projected Kt/V:1 14 -adjusted blood flow rate of 400  Sodium: 138  Potassium: 3  Bicarbonate: 35  Ultrafiltration: 0 5  Medications given on HD: -    Physical Exam:    /52   Pulse 72   Temp 98 °F (36 7 °C) (Oral)   Resp 18   Ht 5' 7" (1 702 m)   Wt 91 6 kg (201 lb 15 1 oz)   LMP  (LMP Unknown)   SpO2 100%   BMI 31 63 kg/m²     General:  No acute distress  CVS:  Regular  Lungs:  Clear to auscultation  Abdomen:  Distended, soft noted tenderness  Access:  PermCath  Extremities:  No significant edema      Assessment:  1  End-stage renal disease, maintenance hemodialysis Tuesday Thursday Saturday  2  History of renal transplant currently on tacrolimus and prednisone, goal TAC level 2-4  3  Recurrent UTIs currently now with possible pyelonephritis  4  Acute on chronic abdominal pain, multifactorial,  5  Anemia chronic kidney disease, continue with LEO therapy, continue monitor closely, may require PRBC transfusion  6  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

## 2020-09-22 NOTE — ASSESSMENT & PLAN NOTE
Intractable nausea abdominal pain  Discussed with GI status post EGD noted to have ulcers in the esophagus, duodenum and dusky appearance of antrum concerning for ischemia  CTA abdomen noted, vascular surgery consult  GI following  Vascular surgery input noted  Continue pantoprazole, antiemetics supportive care    9/22-patient still noted with limited p o   Intake; vascular surgery feels infrarenal vascular disease is not contributory to ulcers   -awaiting biopsy results; as per GI continue Magic mouth wash q 6 hours, PPI IV b i d , will add Compazine   -advance diet as tolerated; follow-up in outpatient setting

## 2020-09-22 NOTE — PLAN OF CARE
Problem: Potential for Falls  Goal: Patient will remain free of falls  Description: INTERVENTIONS:  - Assess patient frequently for physical needs  -  Identify cognitive and physical deficits and behaviors that affect risk of falls    -  [place] fall precautions as indicated by assessment   - Educate patient/family on patient safety including physical limitations  - Instruct patient to call for assistance with activity based on assessment  - Modify environment to reduce risk of injury  - Consider OT/PT consult to assist with strengthening/mobility  Outcome: Progressing     Problem: METABOLIC, FLUID AND ELECTROLYTES - ADULT  Goal: Electrolytes maintained within normal limits  Description: INTERVENTIONS:  - Monitor labs and assess patient for signs and symptoms of electrolyte imbalances  - Administer electrolyte replacement as ordered  - Monitor response to electrolyte replacements, including repeat lab results as appropriate  - Instruct patient on fluid and nutrition as appropriate  Outcome: Progressing  Goal: Fluid balance maintained  Description: INTERVENTIONS:  - Monitor labs   - Monitor I/O and WT  - Instruct patient on fluid and nutrition as appropriate  - Assess for signs & symptoms of volume excess or deficit  Outcome: Progressing     Problem: Prexisting or High Potential for Compromised Skin Integrity  Goal: Skin integrity is maintained or improved  Description: INTERVENTIONS:  - Identify patients at risk for skin breakdown  - Assess and monitor skin integrity  - Assess and monitor nutrition and hydration status  - Monitor labs   - Assess for incontinence   - Turn and reposition patient  - Assist with mobility/ambulation  - Relieve pressure over bony prominences  - Avoid friction and shearing  - Provide appropriate hygiene as needed including keeping skin clean and dry  - Evaluate need for skin moisturizer/barrier cream  - Collaborate with interdisciplinary team   - Patient/family teaching  - Consider wound care consult   Outcome: Progressing     Problem: Nutrition/Hydration-ADULT  Goal: Nutrient/Hydration intake appropriate for improving, restoring or maintaining nutritional needs  Description: Monitor and assess patient's nutrition/hydration status for malnutrition  Collaborate with interdisciplinary team and initiate plan and interventions as ordered  Monitor patient's weight and dietary intake as ordered or per policy  Utilize nutrition screening tool and intervene as necessary  Determine patient's food preferences and provide high-protein, high-caloric foods as appropriate       INTERVENTIONS:  - Monitor oral intake, urinary output, labs, and treatment plans  - Assess nutrition and hydration status and recommend course of action  - Evaluate amount of meals eaten  - Assist patient with eating if necessary   - Allow adequate time for meals  - Recommend/ encourage appropriate diets, oral nutritional supplements, and vitamin/mineral supplements  - Order, calculate, and assess calorie counts as needed  - Recommend, monitor, and adjust tube feedings and TPN/PPN based on assessed needs  - Assess need for intravenous fluids  - Provide specific nutrition/hydration education as appropriate  - Include patient/family/caregiver in decisions related to nutrition  Outcome: Progressing

## 2020-09-22 NOTE — PLAN OF CARE
HD tx plan: 3 5 hr dialysis tx, removing 0 5L as geovany per Dr Indigo Chavira, pt below edw  3K bath for K 4 5 9/19, bmet and cbc drawn pre hd

## 2020-09-22 NOTE — CASE MANAGEMENT
Late entry for 9/21/2020: Met with patient and her sister, Fernando Mathias who is visiting a few weeks from [facility] to assist their parents  Patient's father has new lung cancer diagnosis and parents may be limited in ability to assist the patient  Discussed therapy recommendations for rehab and patient says she refuses this and will be going home  Attempts to problem solve her transportation to HD and  her inability to care for herself were blocked by patient repeatedly saying she can do these things and is going home  Sister plans to speak to parents to ask if patient can still plan to stay with them at discharge

## 2020-09-22 NOTE — ASSESSMENT & PLAN NOTE
· IgG kappa MM stage III diagnosed in April 2019  · 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, 2017  · Ct Lanalodimide 2 5 mg daily  · Ct outpatient follow-up with primary oncologist Dr Melissa Mullins

## 2020-09-22 NOTE — ASSESSMENT & PLAN NOTE
Lab Results   Component Value Date    HGBA1C 5 2 08/21/2020       Recent Labs     09/21/20  2100 09/21/20  2122 09/22/20  0655 09/22/20  1027   POCGLU 67 80 116 103       Blood Sugar Average: Last 72 hrs:  (P) 96 1875     On Toujeo 6 units hs and Humalog sliding scale as outpatient  Lantus 3 hs, SSI  Monitor blood sugars

## 2020-09-22 NOTE — ASSESSMENT & PLAN NOTE
· Was on Torsemide 20 mg daily, doxazosin 4 mg at bedtime, hydralazine 100 mg 3 times daily  · Monitor blood pressures  · Avoid hypotension  · 9/22-BP medications apparently held on admission; blood pressure is trending up; p r n  IV hydralazine 10 mg for SBP greater than 170  · Will consider restarting hydralazine oral if blood pressures remain high/stable overnight

## 2020-09-22 NOTE — ASSESSMENT & PLAN NOTE
My findings and recommendations are based on patient's symptoms, eye exam, diagnostic testing, and records. · As needed lorazepam held due to encephalopathy  · Continue Abilify, BuSpar, sertraline

## 2020-09-22 NOTE — RESTORATIVE TECHNICIAN NOTE
Restorative Specialist Mobility Note       Activity: Ambulate in room, Chair     Assistive Device: Other (Comment)(HHA)        Repositioned: Sitting, Up in chair

## 2020-09-22 NOTE — ASSESSMENT & PLAN NOTE
Possible urine tract infection  Abdominal imaging concerning for pyelonephritis  Discontinue IV cefepime; patient has completed 7 day course of antibiotics with improving abdominal pain and nausea  Urine culture sensitivity noted; white count normal patient afebrile

## 2020-09-23 NOTE — ASSESSMENT & PLAN NOTE
Lab Results   Component Value Date    HGBA1C 5 2 08/21/2020       Recent Labs     09/22/20  1623 09/22/20  2122 09/23/20  0612 09/23/20  1105   POCGLU 127 98 94 80       Blood Sugar Average: Last 72 hrs:  (P) 97 1875     On Toujeo 6 units hs and Humalog sliding scale as outpatient  Lantus 3 hs, SSI  Monitor blood sugars

## 2020-09-23 NOTE — PROGRESS NOTES
Progress Note - Sung Henderson 1965, 63 y o  female MRN: [id number]    Unit/Bed#: [facility] 506-01 Encounter: [id number]    Primary Care Provider: Win Segundo MD   Date and time admitted to hospital: 9/16/2020  9:34 AM        Renal transplant recipient  Assessment & Plan  · S/p renal-pancreatic transplant in 1998  · On HD since May, 2020  · On prednisone 5 mg daily, Tacrolimus 2 mg BID - continue  · F/u  Tacrolimus level - goal level 2-4      Hyperkalemia  Assessment & Plan  · Likely from missed HD x 2  · Has resumed HD  · Improved    ESRD (end stage renal disease) ([facility] [address] )  Assessment & Plan  · On HD on Tuesday/Thursday/Saturday  · Dialysis per nephrology  · Nephrology following    Multiple myeloma not having achieved remission [facility])  Assessment & Plan  · IgG kappa MM stage III diagnosed in April 2019  · 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, 2017  · Ct Lanalodimide 2 5 mg daily  · Ct outpatient follow-up with primary oncologist Dr Waqas Wright    Hyperlipidemia  Assessment & Plan  · Ct Pravastatin 80 mg daily    FELIPE (generalized anxiety disorder)/depression  Assessment & Plan  · As needed lorazepam held due to encephalopathy  · Continue Abilify, BuSpar, sertraline  Failure of pancreas transplant  Assessment & Plan  · S/p pancreatic/kidney transplant in 1998  · Failed pancreatic transplant in 2017    Intractable nausea and vomiting  Assessment & Plan  Intractable nausea abdominal pain  Discussed with GI status post EGD noted to have ulcers in the esophagus, duodenum and dusky appearance of antrum concerning for ischemia  CTA abdomen noted, vascular surgery consult  GI following  Vascular surgery input noted  Continue pantoprazole, antiemetics supportive care    9/22-patient still noted with limited p o   Intake; vascular surgery feels infrarenal vascular disease is not contributory to ulcers   -awaiting biopsy results; as per GI continue Magic mouth wash q 6 hours, PPI IV b i d , will add Compazine   -advance diet as tolerated; follow-up in outpatient setting      Diabetes mellitus type I, controlled [facility])  Assessment & Plan  Lab Results   Component Value Date    HGBA1C 5 2 08/21/2020       Recent Labs     09/22/20  1623 09/22/20  2122 09/23/20  0612 09/23/20  1105   POCGLU 127 98 94 80       Blood Sugar Average: Last 72 hrs:  (P) 97 1875     On Toujeo 6 units hs and Humalog sliding scale as outpatient  Lantus 3 hs, SSI  Monitor blood sugars      Toxic metabolic encephalopathy  Assessment & Plan  · Likely metabolic  · Improving  · Monitor closely    Hypertension  Assessment & Plan  · Was on Torsemide 20 mg daily, doxazosin 4 mg at bedtime, hydralazine 100 mg 3 times daily  · Monitor blood pressures  · Avoid hypotension  · 9/22-BP medications apparently held on admission; blood pressure is trending up; p r n  IV hydralazine 10 mg for SBP greater than 170  · Will consider restarting hydralazine oral if blood pressures remain high/stable overnight     9/23-restarted hydralazine 100 mg t i d       Acquired hypothyroidism  Assessment & Plan  · Ct levothyroxine 125 mcg daily    * UTI (urinary tract infection)  Assessment & Plan  Possible urine tract infection  Abdominal imaging concerning for pyelonephritis  Discontinue IV cefepime; patient has completed 7 day course of antibiotics with improving abdominal pain and nausea  Urine culture sensitivity noted; white count normal patient afebrile      VTE Pharmacologic Prophylaxis:   Pharmacologic: Heparin  Mechanical VTE Prophylaxis in Place: Yes    Patient Centered Rounds: I have performed bedside rounds with nursing staff today  Discussions with Specialists or Other Care Team Provider:     Education and Discussions with Family / Patient: Care plan discussed with patient who voiced understanding and agrees with recommendations  Time Spent for Care: 30 minutes    More than 50% of total time spent on counseling and coordination of care as described above     Current Length of Stay: 7 day(s)    Current Patient Status: Inpatient   Certification Statement: The patient will continue to require additional inpatient hospital stay due to Treatment of intractable nausea and vomiting    Discharge Plan: To be determined, likely tomorrow    Code Status: Level 1 - Full Code      Subjective:   Patient seen examined bedside, reports she is feeling much better and is able to tolerate p o  Intake better than previously  Scheduled for HD tomorrow; will monitor overnight and advance diet as appropriate  Likely stable for discharge tomorrow  Has received 8 days of IV antibiotics for UTI treatment and denies any dysuria, frequency, or any other issues  Cleared by GI for discharge; patient and family are refusing acute inpatient rehab  Likely discharge tomorrow with VNA services  Objective:     Vitals:   Temp (24hrs), Av 5 °F (36 9 °C), Min:98 2 °F (36 8 °C), Max:98 7 °F (37 1 °C)    Temp:  [98 2 °F (36 8 °C)-98 7 °F (37 1 °C)] 98 2 °F (36 8 °C)  HR:  [80-88] 88  Resp:  [18-20] 18  BP: (125-132)/(56-64) 129/59  SpO2:  [100 %] 100 %  Body mass index is 31 63 kg/m²  Input and Output Summary (last 24 hours): Intake/Output Summary (Last 24 hours) at 2020 1546  Last data filed at 2020 0608  Gross per 24 hour   Intake 495 83 ml   Output 600 ml   Net -104 17 ml       Physical Exam:     Physical Exam  Vitals signs and nursing note reviewed  Constitutional:       Appearance: She is obese  HENT:      Head: Normocephalic and atraumatic  Right Ear: External ear normal       Left Ear: External ear normal       Nose: Nose normal       Mouth/Throat:      Mouth: Mucous membranes are moist    Eyes:      Extraocular Movements: Extraocular movements intact  Conjunctiva/sclera: Conjunctivae normal       Pupils: Pupils are equal, round, and reactive to light  Neck:      Musculoskeletal: Normal range of motion and neck supple     Cardiovascular:      Rate and Rhythm: Normal rate and regular rhythm  Heart sounds: Normal heart sounds  Pulmonary:      Effort: Pulmonary effort is normal       Breath sounds: Rales present  Abdominal:      Palpations: Abdomen is soft  Musculoskeletal: Normal range of motion  Skin:     General: Skin is warm and dry  Neurological:      Mental Status: She is alert and oriented to person, place, and time  Psychiatric:         Mood and Affect: Mood normal          Behavior: Behavior normal          Judgment: Judgment normal            Additional Data:     Labs:    Results from last 7 days   Lab Units 09/22/20  0933  09/17/20  1028   WBC Thousand/uL 6 94   < > 5 94   HEMOGLOBIN g/dL 7 4*   < > 8 5*   HEMATOCRIT % 22 2*   < > 25 6*   PLATELETS Thousands/uL 155   < > 167   NEUTROS PCT %  --   --  68   LYMPHS PCT %  --   --  13*   MONOS PCT %  --   --  16*   EOS PCT %  --   --  1    < > = values in this interval not displayed  Results from last 7 days   Lab Units 09/23/20  0526  09/22/20  0933   SODIUM mmol/L 132*   < > 122*   POTASSIUM mmol/L 3 9   < > 3 8   CHLORIDE mmol/L 98*   < > 87*   CO2 mmol/L 29   < > 23   BUN mg/dL 26*   < > 50*   CREATININE mg/dL 3 99*   < > 6 34*   ANION GAP mmol/L 5   < > 12   CALCIUM mg/dL 8 1*   < > 7 8*   ALBUMIN g/dL  --   --  2 7*   TOTAL BILIRUBIN mg/dL  --   --  0 36   ALK PHOS U/L  --   --  138*   ALT U/L  --   --  18   AST U/L  --   --  14   GLUCOSE RANDOM mg/dL 87   < > 98    < > = values in this interval not displayed           Results from last 7 days   Lab Units 09/23/20  1105 09/23/20  0612 09/22/20 2122 09/22/20  1623 09/22/20  1027 09/22/20  0655 09/21/20 2122 09/21/20  2100 09/21/20  1606 09/21/20  1136 09/21/20  0657 09/20/20  2239   POC GLUCOSE mg/dl 80 94 98 127 103 116 80 67 105 111 103 116         Results from last 7 days   Lab Units 09/20/20  1717 09/18/20  0440 09/17/20  1028   LACTIC ACID mmol/L 0 7  --   --    PROCALCITONIN ng/ml  --  1 28* 1 63*           * I Have Reviewed All Lab Data Listed Above  * Additional Pertinent Lab Tests Reviewed:  All Labs Within Last 24 Hours Reviewed    Imaging:    Imaging Reports Reviewed Today Include:   Imaging Personally Reviewed by Myself Includes:      Recent Cultures (last 7 days):           Last 24 Hours Medication List:   Current Facility-Administered Medications   Medication Dose Route Frequency Provider Last Rate    acetaminophen  650 mg Oral Q6H PRN Dot Morales MD      al mag oxide-diphenhydramine-lidocaine viscous  10 mL Swish & Swallow Q6H [facility] HOME Duayne Sandifer, MD      ARIPiprazole  30 mg Oral HS Dot Morales MD      aspirin  81 mg Oral Daily Dot Morales MD      busPIRone  10 mg Oral BID Dot Morales MD      dextrose  50 mL/hr Intravenous Continuous Eileen Meeks MD 50 mL/hr (09/22/20 2152)    doxercalciferol  1 mcg Intravenous After Dialysis Dot Morales MD      epoetin rebecca  8,000 Units Intravenous After Dialysis Dot Morales MD      folic acid  2,685 mcg Oral Daily Dot Morales MD      heparin (porcine)  5,000 Units Subcutaneous [facility] Dot Morales MD      hydrALAZINE  10 mg Intravenous Q6H PRN Lorna Ward MD      hydrALAZINE  100 mg Oral [facility] Lorna Ward MD      HYDROmorphone  0 5 mg Intravenous Q4H PRN Oxana Dose, MD      insulin lispro  1-5 Units Subcutaneous TID [facility] Dot Morales MD      insulin lispro  1-5 Units Subcutaneous HS Dot Morales MD      lenalidomide  2 5 mg Oral Daily Dot Morales MD      levothyroxine  125 mcg Oral Early Morning Dot Morales MD      ondansetron  4 mg Intravenous Q6H PRN Dot Morales MD      oxyCODONE  2 5 mg Oral Q6H PRN Chryssander Dose, MD      pantoprazole  40 mg Intravenous Q12H [facility] Eileen Meeks MD      pravastatin  80 mg Oral Daily Dot Morales MD      predniSONE  5 mg Oral Daily Dot Morales MD      prochlorperazine  10 mg Oral Q6H PRN Lorna Ward MD      rOPINIRole 0 25 mg Oral BID Zain Hdez MD      sertraline  200 mg Oral Daily Zain Hdez MD      sevelamer  800 mg Oral TID With Meals Zain Hdez MD      tacrolimus  1 mg Oral HS Bigg Will PA-C      tacrolimus  2 mg Oral Daily Bigg Will PA-C          Today, Patient Was Seen By: Anupam Mendoza MD    ** Please Note: Dictation voice to text software may have been used in the creation of this document   **

## 2020-09-23 NOTE — PLAN OF CARE
Problem: Potential for Falls  Goal: Patient will remain free of falls  Description: INTERVENTIONS:  - Assess patient frequently for physical needs  -  Identify cognitive and physical deficits and behaviors that affect risk of falls    -  Zaleski fall precautions as indicated by assessment   - Educate patient/family on patient safety including physical limitations  - Instruct patient to call for assistance with activity based on assessment  - Modify environment to reduce risk of injury  - Consider OT/PT consult to assist with strengthening/mobility  Outcome: Progressing     Problem: METABOLIC, FLUID AND ELECTROLYTES - ADULT  Goal: Electrolytes maintained within normal limits  Description: INTERVENTIONS:  - Monitor labs and assess patient for signs and symptoms of electrolyte imbalances  - Administer electrolyte replacement as ordered  - Monitor response to electrolyte replacements, including repeat lab results as appropriate  - Instruct patient on fluid and nutrition as appropriate  Outcome: Progressing  Goal: Fluid balance maintained  Description: INTERVENTIONS:  - Monitor labs   - Monitor I/O and WT  - Instruct patient on fluid and nutrition as appropriate  - Assess for signs & symptoms of volume excess or deficit  Outcome: Progressing     Problem: Prexisting or High Potential for Compromised Skin Integrity  Goal: Skin integrity is maintained or improved  Description: INTERVENTIONS:  - Identify patients at risk for skin breakdown  - Assess and monitor skin integrity  - Assess and monitor nutrition and hydration status  - Monitor labs   - Assess for incontinence   - Turn and reposition patient  - Assist with mobility/ambulation  - Relieve pressure over bony prominences  - Avoid friction and shearing  - Provide appropriate hygiene as needed including keeping skin clean and dry  - Evaluate need for skin moisturizer/barrier cream  - Collaborate with interdisciplinary team   - Patient/family teaching  - Consider wound care consult   Outcome: Progressing     Problem: Nutrition/Hydration-ADULT  Goal: Nutrient/Hydration intake appropriate for improving, restoring or maintaining nutritional needs  Description: Monitor and assess patient's nutrition/hydration status for malnutrition  Collaborate with interdisciplinary team and initiate plan and interventions as ordered  Monitor patient's weight and dietary intake as ordered or per policy  Utilize nutrition screening tool and intervene as necessary  Determine patient's food preferences and provide high-protein, high-caloric foods as appropriate       INTERVENTIONS:  - Monitor oral intake, urinary output, labs, and treatment plans  - Assess nutrition and hydration status and recommend course of action  - Evaluate amount of meals eaten  - Assist patient with eating if necessary   - Allow adequate time for meals  - Recommend/ encourage appropriate diets, oral nutritional supplements, and vitamin/mineral supplements  - Order, calculate, and assess calorie counts as needed  - Recommend, monitor, and adjust tube feedings and TPN/PPN based on assessed needs  - Assess need for intravenous fluids  - Provide specific nutrition/hydration education as appropriate  - Include patient/family/caregiver in decisions related to nutrition  Outcome: Progressing

## 2020-09-23 NOTE — ASSESSMENT & PLAN NOTE
· Was on Torsemide 20 mg daily, doxazosin 4 mg at bedtime, hydralazine 100 mg 3 times daily  · Monitor blood pressures  · Avoid hypotension  · 9/22-BP medications apparently held on admission; blood pressure is trending up; p r n  IV hydralazine 10 mg for SBP greater than 170    · Will consider restarting hydralazine oral if blood pressures remain high/stable overnight     9/23-restarted hydralazine 100 mg t i d

## 2020-09-23 NOTE — CASE MANAGEMENT
Call received from patient's mother, Isabella Police, cell # [phone]  She reports patient does not want rehab and has been through this before and always recovered to dc home  Mother will continue to transport for HD  Patient has 3 JESSI at her apartment  CM will discuss Vishnu Marinelli referral with her  Therapy will work with patient later today  Request was made for patient to go to HD in the chair tomorrow

## 2020-09-23 NOTE — PROGRESS NOTES
Patient MJ has no periferal IV access at this time  Unable to attain access due to limited sites and patient being a hard stick   mk

## 2020-09-23 NOTE — TELEPHONE ENCOUNTER
----- Message from Chuck Barrera MD sent at 9/22/2020  9:47 AM EDT -----  Regarding: Outpatient EGD  Please schedule patient for repeat EGD in 8-12 weeks for severe esophageal ulcers       ---------------------------------------------------  Note Electronically Signed By:    MD Christi Morocho 72 Gastroenterology Fellow PGY-5  [facility] #: [id number]

## 2020-09-23 NOTE — PROGRESS NOTES
NEPHROLOGY PROGRESS NOTE   Migdalia Perry 63 y o  female MRN: [id number]  Unit/Bed#: [facility] 506-01 Encounter: [id number]      ASSESSMENT/PLAN:  1  ESRD: on HD TTS at [facility]  S/p failed renal transplant and started on HD May 2020  · Missed 1 week of treatment prior to admission due to not feeling well and admitted with hyperkalemia and acidosis  · Next HD tomorrow  · Home immunosuppression:  tacrolimus 2mg am and 1mg pm and prednisone 5mg daily   · Goal tac level 2-4  Last level 2 5 on 09/17  2  Urinary tract infection with possible pyelonephritis:  On IV cefepime  3  Abdominal pain: GI on board and EGD revealed esophagitis, ulcers and possible ischemic injury  CTA showed atherosclerotic disease and concern for congestive changes  Vascular consulted  4  Anemia of CKD:  hgb  7 4 yesterday  Continue epogen 8000 units qHD  5  Mineral Bone Disease of CKD:  Continue Renagel 1 tab t i d  With meals  Last phos 5 4   6  Metabolic acidosis: improved with dialysis  · Will stop oral bicarb   7  Multiple Myeloma: on revlimid per hematology   8  Access: R permacath  · Also has RUE AVF placed 7/13/2020 and duplex 8/31 shows that access appears to be mature but is too deep -- following up with vascular on 9/25 and may need superficialization     Plan Summary:    HD tomorrow    Stop oral bicarb     SUBJECTIVE:  Feeling better today with less abdominal pain   IV infiltrated so off D5W but has increased oral intake so hopefully can maintain glucose off IVF    OBJECTIVE:  Current Weight: Weight - Scale: 91 6 kg (201 lb 15 1 oz)  Vitals:    09/23/20 0539   BP: 132/64   Pulse: 81   Resp: 18   Temp: 98 2 °F (36 8 °C)   SpO2: 100%       Intake/Output Summary (Last 24 hours) at 9/23/2020 1037  Last data filed at 9/23/2020 1204  Gross per 24 hour   Intake 1158 33 ml   Output 1000 ml   Net 158 33 ml     General:  No acute distress  Skin:  No rash  Eyes:  Sclerae anicteric  ENT:  Moist mucous membranes  Neck:  Trachea midline Chest:  Clear to auscultation bilaterally  CVS:  Regular rate and rhythm  Abdomen:  Improving tenderness   Extremities:  No edema  Neuro:  Awake and alert  Psych:  Appropriate affect      Medications:  Scheduled Meds:  Current Facility-Administered Medications   Medication Dose Route Frequency Provider Last Rate    acetaminophen  650 mg Oral Q6H PRN Bree Kerns MD      al mag oxide-diphenhydramine-lidocaine viscous  10 mL Swish & Swallow Q6H [facility] HOME Sai Cox MD      ARIPiprazole  30 mg Oral HS rBee Kerns MD      aspirin  81 mg Oral Daily Bree Kerns MD      busPIRone  10 mg Oral BID Bree Kerns MD      cefepime  1,000 mg Intravenous Q24H Bree Kerns MD Stopped (09/22/20 1407)    dextrose  50 mL/hr Intravenous Continuous Eileen Meeks MD 50 mL/hr (09/22/20 2152)    doxercalciferol  1 mcg Intravenous After Dialysis Bree Kerns MD      epoetin rebecca  8,000 Units Intravenous After Dialysis Bree Kerns MD      folic acid  5,796 mcg Oral Daily Bree Kerns MD      heparin (porcine)  5,000 Units Subcutaneous [facility] Bree Kerns MD      hydrALAZINE  10 mg Intravenous Q6H PRN Carolee Manley MD      HYDROmorphone  0 5 mg Intravenous Q4H PRN Arlyn Koyanagi, MD      insulin lispro  1-5 Units Subcutaneous TID [facility] Bere Kerns MD      insulin lispro  1-5 Units Subcutaneous HS Bree Kerns MD      lenalidomide  2 5 mg Oral Daily Bree Kerns MD      levothyroxine  125 mcg Oral Early Morning Bree Kerns MD      ondansetron  4 mg Intravenous Q6H PRN Bree Kerns MD      oxyCODONE  2 5 mg Oral Q6H PRN Arlyn Koyanagi, MD      pantoprazole  40 mg Intravenous Q12H [facility] longterm Eileen Meeks MD      pravastatin  80 mg Oral Daily Bree Kerns MD      predniSONE  5 mg Oral Daily Bree Kerns MD      prochlorperazine  10 mg Oral Q6H PRN Carolee Manley MD      rOPINIRole  0 25 mg Oral BID Bree Kerns MD      sertraline 200 mg Oral Daily Bree Kerns MD      sevelamer  800 mg Oral TID With Meals Bree Kerns MD      sodium bicarbonate  1,300 mg Oral BID after meals Bree Kerns MD      tacrolimus  1 mg Oral HS Tad JOHN Roman      tacrolimus  2 mg Oral Daily Sathish Romna PA-C         PRN Meds:   acetaminophen    doxercalciferol    epoetin rebecca    hydrALAZINE    HYDROmorphone    ondansetron    oxyCODONE    prochlorperazine    Continuous Infusions:dextrose, 50 mL/hr, Last Rate: 50 mL/hr (09/22/20 2152)        Laboratory Results:  Results from last 7 days   Lab Units 09/23/20  0526 09/22/20  1958 09/22/20  0933 09/19/20  0928 09/18/20  0302 09/17/20  1028 09/16/20 2010   WBC Thousand/uL  --   --  6 94  --  7 46 5 94  --    HEMOGLOBIN g/dL  --   --  7 4*  --  8 9* 8 5*  --    HEMATOCRIT %  --   --  22 2*  --  27 7* 25 6*  --    PLATELETS Thousands/uL  --   --  155  --  172 167 175   SODIUM mmol/L 132* 135* 122* 133* 133* 133* 137   POTASSIUM mmol/L 3 9 4 0 3 8 4 5 4 4 4 1 3 5   CHLORIDE mmol/L 98* 99* 87* 105 102 101 102   CO2 mmol/L 29 32 23 16* 18* 22 22   BUN mg/dL 26* 23 50* 65* 53* 45* 38*   CREATININE mg/dL 3 99* 3 34* 6 34* 8 25* 7 35* 6 53* 4 77*   CALCIUM mg/dL 8 1* 7 7* 7 8* 7 7* 8 4 8 0* 8 1*   MAGNESIUM mg/dL  --   --   --   --   --  2 6 2 4   PHOSPHORUS mg/dL  --   --  5 4*  --   --  5 4* 3 6

## 2020-09-23 NOTE — RESTORATIVE TECHNICIAN NOTE
Restorative Specialist Mobility Note       Activity: Ambulate in barron     Assistive Device: Front wheel walker        Repositioned: Sitting, Up in chair

## 2020-09-23 NOTE — PROGRESS NOTES
Pastoral Care Progress Note    2020  Patient: Alina Conroy : 1965  Admission Date & Time: 2020 0934  MRN: [id number] [facility]: [id number]                     Chaplaincy Interventions Utilized:   Empowerment: Encouraged self-care and Provided chaplaincy education    Exploration: Explored hope, Explored emotional needs & resources, Explored relational needs & resources, Explored spiritual needs & resources and Facilitated story telling    Relationship Building: Cultivated a relationship of care and support, Listened empathically and Provided silent and supportive presence    Ritual: Provided prayer    Chaplaincy Outcomes Achieved:  Catharsis, Expressed gratitude, Identified meaningful connections and Verbally processed emotions     met with patient  Patient was grateful for visit  Patient identified her small support system which includes her father, mother, sibling, and a friend from [place]  She said her father has lung cancer and her mother is taking care of him and her  Patient does not identify with a husam tradition but prays    prayed with patient for comfort, healing, and these meaningful relationships          20 1500   Clinical Encounter Type   Visited With Patient   Routine Visit Introduction   Catholic Encounters   Catholic Needs Prayer   Patient Spiritual Encounters   Fear Level 5   Coping 5   Social Interaction 100% of the time

## 2020-09-24 NOTE — PROGRESS NOTES
Progress Note- Simi Thomas 63 y o  female MRN: [id number]  [facility] Encounter: [id number]    Assessment and Plan: Evan hood 63 y o  female with PMH:  End-stage renal disease on HD, multiple myeloma, renal transplant on tacrolimus and prednisone, failed pancreatic transplant, severe esophagitis who presented for intractable nausea abdominal pain and abnormal CT scan showing esophageal thickening who underwent EGD on 09/20/2020 found to have LA class D esophagitis, multiple esophageal ulcers, large crater duodenal ulcers       #LA Grade D Esophagitis  #Esophageal Ulcers  #Large Duodenal Ulcers  Patient underwent upper endoscopy on 09/20/2020 and was found have LA class D esophagitis with multiple esophageal ulcers  Biopsies were taken at that time which was negative for infectious causes  No dysplasia or malignancy noted  Patient noted to have Gastrin level resulted - mildly elevated at 162  Not likely to be gastrinoma  No evidence of H pylori or malignancy noted on gastric biopsies  Plan:  -repeat EGD ordered in 12 weeks  -discharge with protonix 40mg BID PO (to continue for 3 months)  -avoid NSAIDs    GI team will sign off at this time, please do not hesitate to contact us with any further questions or concerns about the patient at any point     ______________________________________________________________________    Subjective:     Patient states her abdominal pain is significantly improved while being on Protonix  States her esophageal pain has gotten better and she is tolerating her diet  She is looking forward to going home       Medication Administration - last 24 hours from 09/23/2020 1029 to 09/24/2020 1029       Date/Time Order Dose Route Action Action by     09/24/2020 0912 epoetin rebecca (EPOGEN,PROCRIT) injection 8,000 Units 8,000 Units Intravenous Given Anamika Glover RN     09/24/2020 0910 doxercalciferol (HECTOROL) injection 1 mcg 1 mcg Intravenous Given Yue DOS SANTOS Cal Arellano, RN     09/23/2020 2148 ARIPiprazole (ABILIFY) tablet 30 mg 30 mg Oral Given Ellen Díaz RN     09/24/2020 7953 aspirin (ECOTRIN LOW STRENGTH) EC tablet 81 mg 81 mg Oral Not Given Lolis Sierra RN     09/24/2020 0820 busPIRone (BUSPAR) tablet 10 mg 10 mg Oral Not Given Lolis Sierra RN     09/23/2020 1725 busPIRone (BUSPAR) tablet 10 mg 10 mg Oral Given Ellen Díaz RN     74/89/0298 1021 folic acid (FOLVITE) tablet 1,000 mcg 1,000 mcg Oral Not Given Lolis Sierra RN     09/24/2020 8535 lenalidomide (REVLIMID) capsule 2 5 mg 2 5 mg Oral Not Given Lolis Sierra RN     09/24/2020 1706 levothyroxine tablet 125 mcg 125 mcg Oral Given Martin Queen RN     09/24/2020 0817 pravastatin (PRAVACHOL) tablet 80 mg 80 mg Oral Not Given Lolis Sierra RN     09/24/2020 0219 predniSONE tablet 5 mg 5 mg Oral Not Given Lolis Sierra RN     09/24/2020 0818 rOPINIRole (REQUIP) tablet 0 25 mg 0 25 mg Oral Not Given Lolis Sierra RN     09/23/2020 1725 rOPINIRole (REQUIP) tablet 0 25 mg 0 25 mg Oral Given Ellen Díaz RN     09/24/2020 0818 sertraline (ZOLOFT) tablet 200 mg 200 mg Oral Not Given Lolis Sierra RN     09/24/2020 1380 heparin (porcine) subcutaneous injection 5,000 Units 5,000 Units Subcutaneous Given Martin Queen RN     09/23/2020 2144 heparin (porcine) subcutaneous injection 5,000 Units 5,000 Units Subcutaneous Given Ellen Díaz RN     09/23/2020 1412 heparin (porcine) subcutaneous injection 5,000 Units 5,000 Units Subcutaneous Given Ellen Díaz RN     09/24/2020 0703 insulin lispro (HumaLOG) 100 units/mL subcutaneous injection 1-5 Units 1 Units Subcutaneous Not Given Lolis Sierra RN     09/23/2020 1655 insulin lispro (HumaLOG) 100 units/mL subcutaneous injection 1-5 Units 1 Units Subcutaneous Not Given Ellen Díaz RN     09/23/2020 1148 insulin lispro (HumaLOG) 100 units/mL subcutaneous injection 1-5 Units 1 Units Subcutaneous Not Given Florinda Echevarria RN     09/23/2020 2147 insulin lispro (HumaLOG) 100 units/mL subcutaneous injection 1-5 Units 1 Units Subcutaneous Not Given Victor Hugo Mobley, MICHELLE     09/24/2020 1738 sevelamer (RENAGEL) tablet 800 mg 800 mg Oral Not Given Karen Perez, RN     09/23/2020 1725 sevelamer (RENAGEL) tablet 800 mg 800 mg Oral Given Ellen Díaz, RN     09/23/2020 1146 sevelamer (RENAGEL) tablet 800 mg 800 mg Oral Given Ellen Díaz, RN     09/24/2020 2238 tacrolimus (PROGRAF) capsule 2 mg 2 mg Oral Not Given Karen Perez, RN     09/23/2020 2149 tacrolimus (PROGRAF) capsule 1 mg 1 mg Oral Given Ellen Díaz, RN     09/24/2020 4783 al mag oxide-diphenhydramine-lidocaine viscous (MAGIC MOUTHWASH) suspension 10 mL 10 mL Swish & Swallow Not Given Mitzy Davenport, RN     09/23/2020 2300 al mag oxide-diphenhydramine-lidocaine viscous (MAGIC MOUTHWASH) suspension 10 mL 10 mL Swish & Swallow Not Given Victor Hugo Mobley, RN     09/23/2020 1725 al mag oxide-diphenhydramine-lidocaine viscous (MAGIC MOUTHWASH) suspension 10 mL 10 mL Swish & Swallow Not Given Victor Hugo Mobley, RN     09/23/2020 1146 al mag oxide-diphenhydramine-lidocaine viscous (MAGIC MOUTHWASH) suspension 10 mL 10 mL Swish & Swallow Not Given Victor Hugo Mobley, RN     09/23/2020 2147 pantoprazole (PROTONIX) injection 40 mg 40 mg Intravenous Not Given Victor Hugo Mobley, MICHELLE     09/24/2020 2677 hydrALAZINE (APRESOLINE) tablet 100 mg 100 mg Oral Given Mitzy Davenport, RN     09/23/2020 2144 hydrALAZINE (APRESOLINE) tablet 100 mg 100 mg Oral Given Ellen Díaz, RN     09/23/2020 1412 hydrALAZINE (APRESOLINE) tablet 100 mg 100 mg Oral Given Ellen Díaz, RN     09/24/2020 0700 pantoprazole (PROTONIX) EC tablet 40 mg 40 mg Oral Not Given Luraramis Perez RN     09/23/2020 1762 pantoprazole (PROTONIX) EC tablet 40 mg 40 mg Oral Given Ellen Díaz RN          Objective:     Vitals: Blood pressure 125/56, pulse 85, temperature (!) 97 2 °F (36 2 °C), temperature source Tympanic, resp   rate 18, height 5' 7" (1 702 m), weight 91 6 kg (201 lb 15 1 oz), SpO2 97 %  ,Body mass index is 31 63 kg/m²  Intake/Output Summary (Last 24 hours) at 9/24/2020 1029  Last data filed at 9/24/2020 0820  Gross per 24 hour   Intake 440 ml   Output 650 ml   Net -210 ml       Physical Exam:   General Appearance:   Alert, cooperative, no distress   HEENT:   Normocephalic, atraumatic, anicteric  Neck:  Supple, symmetrical, trachea midline   Lungs:   Clear to auscultation bilaterally; no rales, rhonchi or wheezing; respirations unlabored    Heart[de-identified]   Regular rate and rhythm; no murmur, rub, or gallop  Abdomen:    soft nontender on palpation   Genitalia:   Deferred    Rectal:   Deferred    Extremities:  No cyanosis, clubbing or edema    Pulses:  2+ and symmetric all extremities    Skin:  No jaundice, rashes, or lesions    Lymph nodes:  No palpable cervical lymphadenopathy        Invasive Devices     Line            Hemodialysis AV Fistula 07/13/20 Right Upper arm 72 days          Hemodialysis Catheter            HD Permanent Double Catheter -- days                Lab Results:  No results displayed because visit has over 200 results  Imaging Studies: I have personally reviewed pertinent imaging studies        ---------------------------------------------------  Note Electronically Signed By:    MD Christi Wilson 72 Gastroenterology Fellow PGY-5  [address] #: [id number]

## 2020-09-24 NOTE — CASE MANAGEMENT
Met with patient and her mother to discuss dc  Mother will transport to HD  Called [facility] to inform them of dc today and resumption of HD schedule, Sat, 9/26/2020

## 2020-09-24 NOTE — PROGRESS NOTES
NEPHROLOGY HEMODIALYSIS PROCEDURE NOTE    Seen and examined on hemodialysis  Doing well  No further abdominal pain  Appetite is significantly improved  No reported chest pain or shortness of breath  QB: 350  Dialyzer: 160  Projected Kt/V:  Sodium: 138  Potassium: 4  Bicarbonate: 35  Ultrafiltration: 1  Medications given on HD:  Epogen    Physical Exam:    /54   Pulse 80   Temp 98 2 °F (36 8 °C) (Oral)   Resp 18   Ht 5' 7" (1 702 m)   Wt 91 6 kg (201 lb 15 1 oz)   LMP  (LMP Unknown)   SpO2 97%   BMI 31 63 kg/m²     General:  No acute distress, appears comfortable  CVS:  Regular  Lungs:  Clear to auscultation  Abdomen:  Soft, nontender  Access:  PermCath, noted AV access, not ready  Extremities:  No significant edema    Assessment:  1  End-stage renal disease maintenance hemodialysis Tuesday Thursday Saturday, [facility] mEery  2  Recent history of failed transplant, currently on tacrolimus and prednisone, goal tacrolimus levels 2-4  3  Recurrent urinary tract infections currently on antibiotic treatment  4  Abdominal pain, multifactorial, EGD showed esophagitis/ulcers with possible ischemic injury  Overall has resolved  5  Anemia of chronic kidney disease, continue with LEO therapy  6  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

## 2020-09-24 NOTE — ASSESSMENT & PLAN NOTE
Lab Results   Component Value Date    HGBA1C 5 2 08/21/2020       Recent Labs     09/23/20  1105 09/23/20  1607 09/23/20  2117 09/24/20  0557   POCGLU 80 132 115 96       Blood Sugar Average: Last 72 hrs:  (P) 101 [id number]     On Toujeo 6 units hs and Humalog sliding scale as outpatient  Lantus 3 hs, SSI  Monitor blood sugars

## 2020-09-24 NOTE — HEMODIALYSIS
Post-Dialysis RN Treatment Note    Blood Pressure:  Pre 123/42 mm/Hg  Post  145/54 mmHg   EDW  87 kg    Weight:  Pre  85 kg   Post  82 9 kg   Mode of weight measurement: Other chair scale   Volume Removed 2502 ml   Treatment duration 210 minutes    NS given  NO    Treatment shortened?  No   Medications given during Rx Epogen 8000 units, Hectorol 1 mcg   KT/V     Access type: Permacath/TDC   Access Status: Yes, describe: prescribed BFR of 350    Report called to primary nurse   Yes [facility]

## 2020-09-24 NOTE — DISCHARGE INSTRUCTIONS
Remember to take medications as directed on your discharge summary and follow up with your PCP within 1-2 weeks  Amlodipine stepped on discharge; will defer to primary care to restart as needed  Blood pressure at goal during hospitalization without it  Patient follow-up with gastroenterology after discharge; resume normal HD schedule

## 2020-09-24 NOTE — ASSESSMENT & PLAN NOTE
Intractable nausea abdominal pain  Discussed with GI status post EGD noted to have ulcers in the esophagus, duodenum and dusky appearance of antrum concerning for ischemia  CTA abdomen noted, vascular surgery consult  GI following  Vascular surgery input noted  Continue pantoprazole, antiemetics supportive care    9/22-patient still noted with limited p o  Intake; vascular surgery feels infrarenal vascular disease is not contributory to ulcers   -awaiting biopsy results; as per GI continue Magic mouth wash q 6 hours, PPI IV b i d , will add Compazine   -advance diet as tolerated; follow-up in outpatient setting    9/24-patient reports that nausea and vomiting has resolved; can tolerate normal p o  Intake  Stable for discharge today

## 2020-09-24 NOTE — CASE MANAGEMENT
Patient is cleared for dc home and she continues to decline snf rehab  A post acute care recommendation was made by your care team for [facility]  Discussed [place] [facility] with patient  [facility] referral made to [facility] as patient preference

## 2020-09-24 NOTE — DISCHARGE SUMMARY
Discharge- Janny Disla 1965, 63 y o  female MRN: [id number]    Unit/Bed#: [facility] 506-01 Encounter: [id number]    Primary Care Provider: Ac Lai MD   Date and time admitted to hospital: 9/16/2020  9:34 AM        Renal transplant recipient  Assessment & Plan  · S/p renal-pancreatic transplant in 1998  · On HD since May, 2020  · On prednisone 5 mg daily, Tacrolimus 2 mg BID - continue  · F/u  Tacrolimus level - goal level 2-4      Hyperkalemia  Assessment & Plan  · Likely from missed HD x 2  · Has resumed HD  · Improved    ESRD (end stage renal disease) ([facility] [address] )  Assessment & Plan  · On HD on Tuesday/Thursday/Saturday  · Dialysis per nephrology  · Nephrology following    Multiple myeloma not having achieved remission [facility])  Assessment & Plan  · IgG kappa MM stage III diagnosed in April 2019  · 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, 2017  · Ct Lanalodimide 2 5 mg daily  · Ct outpatient follow-up with primary oncologist Dr Noelle Webb    Hyperlipidemia  Assessment & Plan  · Ct Pravastatin 80 mg daily    FELIPE (generalized anxiety disorder)/depression  Assessment & Plan  · As needed lorazepam held due to encephalopathy  · Continue Abilify, BuSpar, sertraline  Failure of pancreas transplant  Assessment & Plan  · S/p pancreatic/kidney transplant in 1998  · Failed pancreatic transplant in 2017    Intractable nausea and vomiting  Assessment & Plan  Intractable nausea abdominal pain  Discussed with GI status post EGD noted to have ulcers in the esophagus, duodenum and dusky appearance of antrum concerning for ischemia  CTA abdomen noted, vascular surgery consult  GI following  Vascular surgery input noted  Continue pantoprazole, antiemetics supportive care    9/22-patient still noted with limited p o   Intake; vascular surgery feels infrarenal vascular disease is not contributory to ulcers   -awaiting biopsy results; as per GI continue Magic mouth wash q 6 hours, PPI IV b i d , will add Compazine   -advance diet as tolerated; follow-up in outpatient setting    9/24-patient reports that nausea and vomiting has resolved; can tolerate normal p o  Intake  Stable for discharge today  Diabetes mellitus type I, controlled [facility])  Assessment & Plan  Lab Results   Component Value Date    HGBA1C 5 2 08/21/2020       Recent Labs     09/23/20  1105 09/23/20  1607 09/23/20  2117 09/24/20  0557   POCGLU 80 132 115 96       Blood Sugar Average: Last 72 hrs:  (P) 101 [id number]     On Toujeo 6 units hs and Humalog sliding scale as outpatient  Lantus 3 hs, SSI  Monitor blood sugars      Toxic metabolic encephalopathy  Assessment & Plan  · Likely metabolic  · Improving  · Monitor closely    Hypertension  Assessment & Plan  · Was on Torsemide 20 mg daily, doxazosin 4 mg at bedtime, hydralazine 100 mg 3 times daily  · Monitor blood pressures  · Avoid hypotension  · 9/22-BP medications apparently held on admission; blood pressure is trending up; p r n  IV hydralazine 10 mg for SBP greater than 170    · Will consider restarting hydralazine oral if blood pressures remain high/stable overnight     9/23-restarted hydralazine 100 mg t i d       Acquired hypothyroidism  Assessment & Plan  · Ct levothyroxine 125 mcg daily    * UTI (urinary tract infection)  Assessment & Plan  Possible urine tract infection  Abdominal imaging concerning for pyelonephritis  Discontinue IV cefepime; patient has completed 7 day course of antibiotics with improving abdominal pain and nausea  Urine culture sensitivity noted; white count normal patient afebrile        Discharging Physician / Practitioner: Shiv Rene MD  PCP: Sana Marie MD  Admission Date:   Admission Orders (From admission, onward)     Ordered        09/16/20 1519  Inpatient Admission  Once                   Discharge Date: 09/24/20    Resolved Problems  Date Reviewed: 9/22/2020    None          Consultations During Hospital Stay:  · Gastroenterology, Nephrology    Procedures Performed:   · EGD, dialysis    Significant Findings / Test Results:   · Grade D esophagitis with mucosal breaks measuring 5 mm or more, continuous between folds, covering 75% or more of the circumference in the lower third of the esophagus  · Multiple large, cratered ulcers in the esophagus and duodenum; performed 3 cold biopsies  · There was dusky appearance of the gastric antrum with clear demarcation  However, apart from erosions no clear ulcers were seen   Biopsies were done  · Otherwise mild erosive gastritis in the gastric body  Biopsies were done  · Multiple large, superficial ulcers in the duodenal bulb and 2nd part of the duodenum with no hemorrhage  There was an eschar on the ulcers  No bleeding or stigmata were seen  · Normal distal duodenum  Bacteriuria with greater than 100,000 colony-forming units of E coli    Incidental Findings:   · Hyperkalemia    Test Results Pending at Discharge (will require follow up): · None     Outpatient Tests Requested:  · CBC CMP    Complications:  None    Reason for Admission:  Urosepsis and abdominal pain    Hospital Course:       Please see above list of diagnoses and related plan for additional information  Condition at Discharge: stable     Discharge Day Visit / Exam:     Subjective:  Patient seen examined bedside, no acute distress or discomfort noted  Patient reports she is feels at her baseline and is able tolerate p o  Intake  Completed dialysis this morning; labs and vital stable for discharge today  Patient initially admitted for urosepsis; completed 8 days of IV antibiotics and no longer has urinary symptoms  While inpatient, found to be nauseous and EGD performed that showed numerous esophageal and duodenal ulcers  See report above  Pathology resulted with no H pylori detected  Patient to follow-up with GI in the outpatient setting      Recommendations for post acute care rehab have been declined; however patient will receive home health care  Will resume normal HD on Saturday; will need to monitor hemoglobin in the outpatient setting  Vitals: Blood Pressure: 145/54 (09/24/20 1150)  Pulse: 80 (09/24/20 1150)  Temperature: 98 2 °F (36 8 °C) (09/24/20 1150)  Temp Source: Oral (09/24/20 1150)  Respirations: 18 (09/24/20 1150)  Height: 5' 7" (170 2 cm) (09/17/20 1150)  Weight - Scale: 91 6 kg (201 lb 15 1 oz) (09/17/20 1148)  SpO2: 97 % (09/24/20 0703)     Exam:   Physical Exam  Vitals signs and nursing note reviewed  Constitutional:       Appearance: She is obese  HENT:      Head: Normocephalic and atraumatic  Right Ear: External ear normal       Left Ear: External ear normal       Nose: Nose normal       Mouth/Throat:      Mouth: Mucous membranes are moist    Eyes:      Extraocular Movements: Extraocular movements intact  Conjunctiva/sclera: Conjunctivae normal       Pupils: Pupils are equal, round, and reactive to light  Neck:      Musculoskeletal: Normal range of motion and neck supple  Cardiovascular:      Rate and Rhythm: Normal rate and regular rhythm  Heart sounds: Normal heart sounds  Pulmonary:      Effort: Pulmonary effort is normal       Breath sounds: Rales present  Abdominal:      Palpations: Abdomen is soft  Musculoskeletal: Normal range of motion  Skin:     General: Skin is warm and dry  Neurological:      Mental Status: She is alert and oriented to person, place, and time  Psychiatric:         Mood and Affect: Mood normal          Behavior: Behavior normal          Judgment: Judgment normal          Discussion with Family: Care plan discussed with patient who voiced understanding and agrees with recommendations  Discharge instructions/Information to patient and family:   See after visit summary for information provided to patient and family        Provisions for Follow-Up Care:  See after visit summary for information related to follow-up care and any pertinent home health orders  Disposition:     Home with VNA Services (Reminder: Complete face to face encounter)    For Discharges to [facility] SNF:   · Not Applicable to this Patient - Not Applicable to this Patient    Planned Readmission:  None     Discharge Statement:  I spent 35 minutes discharging the patient  This time was spent on the day of discharge  I had direct contact with the patient on the day of discharge  Greater than 50% of the total time was spent examining patient, answering all patient questions, arranging and discussing plan of care with patient as well as directly providing post-discharge instructions  Additional time then spent on discharge activities  Discharge Medications:  See after visit summary for reconciled discharge medications provided to patient and family        ** Please Note: This note has been constructed using a voice recognition system **

## 2020-09-24 NOTE — PHYSICAL THERAPY NOTE
Physical Therapy Cancellation Note    Pt chart reviewed, pt currently off floor at dialysis  PT to continue to follow and see pt as appropriate and able      Margoth Owens, PT, DPT

## 2020-09-24 NOTE — PLAN OF CARE
Tx initiated without difficulty  Problem: METABOLIC, FLUID AND ELECTROLYTES - ADULT  Goal: Electrolytes maintained within normal limits  Description: INTERVENTIONS:  - Monitor labs and assess patient for signs and symptoms of electrolyte imbalances  - Administer electrolyte replacement as ordered  - Monitor response to electrolyte replacements, including repeat lab results as appropriate  - Instruct patient on fluid and nutrition as appropriate  Outcome: Progressing  Goal: Fluid balance maintained  Description: INTERVENTIONS:  - Monitor labs   - Monitor I/O and WT  - Instruct patient on fluid and nutrition as appropriate  - Assess for signs & symptoms of volume excess or deficit  Outcome: Progressing

## 2020-09-24 NOTE — OCCUPATIONAL THERAPY NOTE
Occupational Therapy Cancellation Note    Orders received and chart reviewed  OT attempted to see, however Pt currently off of the unit at dialysis  Will continue to follow to be seen for OT treatment as appropriate/when medically cleared         Carlyle Paget, MS, OTR/L

## 2020-09-24 NOTE — ASSESSMENT & PLAN NOTE
· IgG kappa MM stage III diagnosed in April 2019  · 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, 2017  · Ct Lanalodimide 2 5 mg daily  · Ct outpatient follow-up with primary oncologist Dr Waqas Wright

## 2020-09-24 NOTE — NURSING NOTE
Pt transported via wheelchair by PCA  Pt taken to entrance B where her mother was picking her up  Pt had no peripheral access to remove  Pt left with chronic HD cath

## 2020-09-30 PROBLEM — K26.9 DUODENAL ULCER: Status: ACTIVE | Noted: 2020-01-01

## 2020-09-30 PROBLEM — K22.10 ULCER OF ESOPHAGUS WITHOUT BLEEDING: Status: ACTIVE | Noted: 2020-01-01

## 2020-09-30 PROBLEM — R11.0 NAUSEA: Status: ACTIVE | Noted: 2020-01-01

## 2020-10-01 NOTE — ASSESSMENT & PLAN NOTE
Rx sent to pharmacy for Zofran 4 mg to take 1 tablet every 8 hours PRN for nausea  Follow-up with gastroenterology

## 2020-10-01 NOTE — ASSESSMENT & PLAN NOTE
EGD done during hospitalization showed esophageal and duodenal ulcers  Continue Pantoprazole 40 mg 1 tablet twice daily  Follow-up with gastroenterology  Patient was recommended to repeat EGD in 4 weeks

## 2020-10-01 NOTE — ASSESSMENT & PLAN NOTE
Lab Results   Component Value Date    HGBA1C 5 2 08/21/2020     Well controlled  Continue Toujeo 6 un at bedtime and Humalog sliding scale  Follow-up with endocrinology Dr Luciano Godinez

## 2020-10-01 NOTE — ASSESSMENT & PLAN NOTE
Patient c/o low blood pressure reading is home, feels dizzy  She stopped taking Hydralazine  Currently taking Torsemide 20 mg daily, Metoprolol 50 mg twice daily  Recommended to decrease dose of Cardura from 4 mg to 2 mg at bedtime  Call nephrology Dr Liza Velazco to review blood pressure medications  Schedule follow-up with with cardiology

## 2020-10-01 NOTE — ASSESSMENT & PLAN NOTE
Resolved  Patient completed antibiotic therapy during hospitalization  Reports no burning on urination  No fever or chills  Recommended to stay well hydrated

## 2021-01-01 ENCOUNTER — TELEPHONE (OUTPATIENT)
Dept: HEMATOLOGY ONCOLOGY | Facility: CLINIC | Age: 56
End: 2021-01-01

## 2021-01-01 ENCOUNTER — HOSPITAL ENCOUNTER (OUTPATIENT)
Dept: INFUSION CENTER | Facility: HOSPITAL | Age: 56
End: 2021-01-01

## 2021-01-01 ENCOUNTER — TELEMEDICINE (OUTPATIENT)
Dept: PSYCHIATRY | Facility: CLINIC | Age: 56
End: 2021-01-01
Payer: MEDICARE

## 2021-01-01 ENCOUNTER — TELEPHONE (OUTPATIENT)
Dept: NEPHROLOGY | Facility: CLINIC | Age: 56
End: 2021-01-01

## 2021-01-01 ENCOUNTER — APPOINTMENT (INPATIENT)
Dept: CT IMAGING | Facility: HOSPITAL | Age: 56
DRG: 208 | End: 2021-01-01
Payer: MEDICARE

## 2021-01-01 ENCOUNTER — APPOINTMENT (EMERGENCY)
Dept: CT IMAGING | Facility: HOSPITAL | Age: 56
DRG: 208 | End: 2021-01-01
Payer: MEDICARE

## 2021-01-01 ENCOUNTER — TELEPHONE (OUTPATIENT)
Dept: ENDOCRINOLOGY | Facility: CLINIC | Age: 56
End: 2021-01-01

## 2021-01-01 ENCOUNTER — HOSPITAL ENCOUNTER (INPATIENT)
Facility: HOSPITAL | Age: 56
LOS: 1 days | DRG: 208 | End: 2021-03-06
Attending: EMERGENCY MEDICINE | Admitting: INTERNAL MEDICINE
Payer: MEDICARE

## 2021-01-01 ENCOUNTER — TRANSCRIBE ORDERS (OUTPATIENT)
Dept: ADMINISTRATIVE | Age: 56
End: 2021-01-01

## 2021-01-01 ENCOUNTER — APPOINTMENT (EMERGENCY)
Dept: RADIOLOGY | Facility: HOSPITAL | Age: 56
DRG: 208 | End: 2021-01-01
Payer: MEDICARE

## 2021-01-01 ENCOUNTER — TELEPHONE (OUTPATIENT)
Dept: GASTROENTEROLOGY | Facility: CLINIC | Age: 56
End: 2021-01-01

## 2021-01-01 ENCOUNTER — TELEPHONE (OUTPATIENT)
Dept: PSYCHIATRY | Facility: CLINIC | Age: 56
End: 2021-01-01

## 2021-01-01 ENCOUNTER — ANESTHESIA EVENT (OUTPATIENT)
Dept: GASTROENTEROLOGY | Facility: HOSPITAL | Age: 56
End: 2021-01-01

## 2021-01-01 ENCOUNTER — OFFICE VISIT (OUTPATIENT)
Dept: NEPHROLOGY | Facility: CLINIC | Age: 56
End: 2021-01-01
Payer: MEDICARE

## 2021-01-01 ENCOUNTER — HOSPITAL ENCOUNTER (OUTPATIENT)
Dept: INFUSION CENTER | Facility: HOSPITAL | Age: 56
Discharge: HOME | End: 2021-03-05
Attending: INTERNAL MEDICINE
Payer: MEDICARE

## 2021-01-01 ENCOUNTER — OFFICE VISIT (OUTPATIENT)
Dept: GASTROENTEROLOGY | Facility: CLINIC | Age: 56
End: 2021-01-01
Payer: MEDICARE

## 2021-01-01 ENCOUNTER — LAB (OUTPATIENT)
Dept: LAB | Age: 56
End: 2021-01-01
Payer: MEDICARE

## 2021-01-01 ENCOUNTER — TELEPHONE (OUTPATIENT)
Dept: GASTROENTEROLOGY | Facility: AMBULARY SURGERY CENTER | Age: 56
End: 2021-01-01

## 2021-01-01 ENCOUNTER — DOCUMENTATION (OUTPATIENT)
Dept: HEMATOLOGY ONCOLOGY | Facility: MEDICAL CENTER | Age: 56
End: 2021-01-01

## 2021-01-01 ENCOUNTER — PATIENT OUTREACH (OUTPATIENT)
Dept: CASE MANAGEMENT | Facility: HOSPITAL | Age: 56
End: 2021-01-01

## 2021-01-01 ENCOUNTER — TELEPHONE (OUTPATIENT)
Dept: OTHER | Facility: OTHER | Age: 56
End: 2021-01-01

## 2021-01-01 ENCOUNTER — HOSPITAL ENCOUNTER (OUTPATIENT)
Dept: GASTROENTEROLOGY | Facility: HOSPITAL | Age: 56
Setting detail: OUTPATIENT SURGERY
Discharge: HOME | End: 2021-02-03

## 2021-01-01 ENCOUNTER — TELEPHONE (OUTPATIENT)
Dept: PALLIATIVE MEDICINE | Facility: CLINIC | Age: 56
End: 2021-01-01

## 2021-01-01 ENCOUNTER — OFFICE VISIT (OUTPATIENT)
Dept: HEMATOLOGY ONCOLOGY | Facility: CLINIC | Age: 56
End: 2021-01-01
Payer: MEDICARE

## 2021-01-01 ENCOUNTER — HOSPITAL ENCOUNTER (OUTPATIENT)
Dept: GASTROENTEROLOGY | Facility: HOSPITAL | Age: 56
Setting detail: OUTPATIENT SURGERY
Discharge: HOME | End: 2021-03-03
Attending: INTERNAL MEDICINE
Payer: MEDICARE

## 2021-01-01 ENCOUNTER — ANESTHESIA (OUTPATIENT)
Dept: GASTROENTEROLOGY | Facility: HOSPITAL | Age: 56
End: 2021-01-01

## 2021-01-01 VITALS
TEMPERATURE: 93.2 F | SYSTOLIC BLOOD PRESSURE: 154 MMHG | HEART RATE: 83 BPM | RESPIRATION RATE: 27 BRPM | DIASTOLIC BLOOD PRESSURE: 70 MMHG | OXYGEN SATURATION: 100 % | WEIGHT: 195.99 LBS | BODY MASS INDEX: 30.76 KG/M2 | HEIGHT: 67 IN

## 2021-01-01 VITALS
HEART RATE: 71 BPM | SYSTOLIC BLOOD PRESSURE: 180 MMHG | DIASTOLIC BLOOD PRESSURE: 60 MMHG | RESPIRATION RATE: 18 BRPM | BODY MASS INDEX: 29.6 KG/M2 | HEIGHT: 68 IN | WEIGHT: 195.33 LBS

## 2021-01-01 VITALS
WEIGHT: 195.4 LBS | BODY MASS INDEX: 29.61 KG/M2 | HEIGHT: 68 IN | RESPIRATION RATE: 18 BRPM | DIASTOLIC BLOOD PRESSURE: 62 MMHG | OXYGEN SATURATION: 97 % | TEMPERATURE: 99.2 F | SYSTOLIC BLOOD PRESSURE: 136 MMHG | HEART RATE: 71 BPM

## 2021-01-01 VITALS
SYSTOLIC BLOOD PRESSURE: 183 MMHG | TEMPERATURE: 96.5 F | OXYGEN SATURATION: 96 % | HEIGHT: 68 IN | WEIGHT: 190 LBS | BODY MASS INDEX: 28.79 KG/M2 | DIASTOLIC BLOOD PRESSURE: 79 MMHG | HEART RATE: 74 BPM | RESPIRATION RATE: 16 BRPM

## 2021-01-01 VITALS
DIASTOLIC BLOOD PRESSURE: 71 MMHG | SYSTOLIC BLOOD PRESSURE: 158 MMHG | BODY MASS INDEX: 29.52 KG/M2 | HEIGHT: 68 IN | WEIGHT: 194.8 LBS | HEART RATE: 68 BPM | TEMPERATURE: 97.5 F

## 2021-01-01 VITALS — WEIGHT: 190 LBS | HEIGHT: 68 IN | BODY MASS INDEX: 28.79 KG/M2

## 2021-01-01 VITALS
DIASTOLIC BLOOD PRESSURE: 60 MMHG | BODY MASS INDEX: 29.98 KG/M2 | SYSTOLIC BLOOD PRESSURE: 170 MMHG | HEIGHT: 68 IN | WEIGHT: 197.8 LBS

## 2021-01-01 DIAGNOSIS — C90.00 MULTIPLE MYELOMA NOT HAVING ACHIEVED REMISSION (HCC): Primary | ICD-10-CM

## 2021-01-01 DIAGNOSIS — E03.9 ACQUIRED HYPOTHYROIDISM: Primary | ICD-10-CM

## 2021-01-01 DIAGNOSIS — I10 ESSENTIAL HYPERTENSION: ICD-10-CM

## 2021-01-01 DIAGNOSIS — R19.7 DIARRHEA, UNSPECIFIED TYPE: ICD-10-CM

## 2021-01-01 DIAGNOSIS — C90.00 MULTIPLE MYELOMA NOT HAVING ACHIEVED REMISSION (HCC): ICD-10-CM

## 2021-01-01 DIAGNOSIS — Z78.9 NEED FOR FOLLOW-UP BY SOCIAL WORKER: ICD-10-CM

## 2021-01-01 DIAGNOSIS — R77.8 ELEVATED TROPONIN: ICD-10-CM

## 2021-01-01 DIAGNOSIS — R10.9 ABDOMINAL PAIN: ICD-10-CM

## 2021-01-01 DIAGNOSIS — F43.12 POST-TRAUMATIC STRESS DISORDER, CHRONIC: Chronic | ICD-10-CM

## 2021-01-01 DIAGNOSIS — D64.9 ANEMIA, UNSPECIFIED TYPE: Primary | ICD-10-CM

## 2021-01-01 DIAGNOSIS — I46.9 CARDIAC ARREST (HCC): ICD-10-CM

## 2021-01-01 DIAGNOSIS — F41.1 GAD (GENERALIZED ANXIETY DISORDER): Chronic | ICD-10-CM

## 2021-01-01 DIAGNOSIS — Z99.2 ESRD (END STAGE RENAL DISEASE) ON DIALYSIS (HCC): ICD-10-CM

## 2021-01-01 DIAGNOSIS — F41.9 ANXIETY: ICD-10-CM

## 2021-01-01 DIAGNOSIS — D64.9 ANEMIA, UNSPECIFIED TYPE: ICD-10-CM

## 2021-01-01 DIAGNOSIS — N18.9 CHRONIC KIDNEY DISEASE, UNSPECIFIED CKD STAGE: ICD-10-CM

## 2021-01-01 DIAGNOSIS — E03.8 OTHER SPECIFIED HYPOTHYROIDISM: ICD-10-CM

## 2021-01-01 DIAGNOSIS — T45.1X5A CHEMOTHERAPY INDUCED NAUSEA AND VOMITING: ICD-10-CM

## 2021-01-01 DIAGNOSIS — E03.9 ACQUIRED HYPOTHYROIDISM: ICD-10-CM

## 2021-01-01 DIAGNOSIS — R11.2 CHEMOTHERAPY INDUCED NAUSEA AND VOMITING: ICD-10-CM

## 2021-01-01 DIAGNOSIS — Z94.0 RENAL TRANSPLANT RECIPIENT: Primary | ICD-10-CM

## 2021-01-01 DIAGNOSIS — I10 BENIGN ESSENTIAL HTN: ICD-10-CM

## 2021-01-01 DIAGNOSIS — E03.9 HYPOTHYROIDISM, UNSPECIFIED TYPE: ICD-10-CM

## 2021-01-01 DIAGNOSIS — F33.0 MAJOR DEPRESSIVE DISORDER, RECURRENT EPISODE, MILD (HCC): Primary | Chronic | ICD-10-CM

## 2021-01-01 DIAGNOSIS — Z94.0 STATUS POST KIDNEY TRANSPLANT: ICD-10-CM

## 2021-01-01 DIAGNOSIS — E10.65 TYPE 1 DIABETES MELLITUS WITH HYPERGLYCEMIA (HCC): ICD-10-CM

## 2021-01-01 DIAGNOSIS — R19.7 DIARRHEA, UNSPECIFIED TYPE: Primary | ICD-10-CM

## 2021-01-01 DIAGNOSIS — E10.40 CONTROLLED TYPE 1 DIABETES MELLITUS WITH DIABETIC NEUROPATHY (HCC): Primary | ICD-10-CM

## 2021-01-01 DIAGNOSIS — Z94.0 RENAL TRANSPLANT RECIPIENT: ICD-10-CM

## 2021-01-01 DIAGNOSIS — N18.6 ESRD (END STAGE RENAL DISEASE) (HCC): ICD-10-CM

## 2021-01-01 DIAGNOSIS — N18.6 ESRD (END STAGE RENAL DISEASE) ON DIALYSIS (HCC): ICD-10-CM

## 2021-01-01 DIAGNOSIS — F41.1 GAD (GENERALIZED ANXIETY DISORDER): Primary | Chronic | ICD-10-CM

## 2021-01-01 DIAGNOSIS — F33.2 MAJOR DEPRESSIVE DISORDER, RECURRENT, SEVERE WITHOUT PSYCHOTIC FEATURES (HCC): Primary | Chronic | ICD-10-CM

## 2021-01-01 LAB
ABO GROUP BLD: NORMAL
ALBUMIN SERPL BCP-MCNC: 2.2 G/DL (ref 3.5–5)
ALBUMIN SERPL BCP-MCNC: 2.3 G/DL (ref 3.5–5)
ALBUMIN SERPL BCP-MCNC: 2.3 G/DL (ref 3.5–5)
ALBUMIN SERPL BCP-MCNC: 2.9 G/DL (ref 3.5–5)
ALBUMIN SERPL BCP-MCNC: 3 G/DL (ref 3.5–5)
ALBUMIN SERPL ELPH-MCNC: 3.37 G/DL (ref 3.5–5)
ALBUMIN SERPL ELPH-MCNC: 3.45 G/DL (ref 3.5–5)
ALBUMIN SERPL ELPH-MCNC: 47.2 % (ref 52–65)
ALBUMIN SERPL ELPH-MCNC: 47.4 % (ref 52–65)
ALP SERPL-CCNC: 154 U/L (ref 46–116)
ALP SERPL-CCNC: 168 U/L (ref 46–116)
ALP SERPL-CCNC: 239 U/L (ref 46–116)
ALP SERPL-CCNC: 250 U/L (ref 46–116)
ALP SERPL-CCNC: 311 U/L (ref 46–116)
ALPHA1 GLOB SERPL ELPH-MCNC: 0.46 G/DL (ref 0.1–0.4)
ALPHA1 GLOB SERPL ELPH-MCNC: 0.49 G/DL (ref 0.1–0.4)
ALPHA1 GLOB SERPL ELPH-MCNC: 6.5 % (ref 2.5–5)
ALPHA1 GLOB SERPL ELPH-MCNC: 6.7 % (ref 2.5–5)
ALPHA2 GLOB SERPL ELPH-MCNC: 0.89 G/DL (ref 0.4–1.2)
ALPHA2 GLOB SERPL ELPH-MCNC: 1.01 G/DL (ref 0.4–1.2)
ALPHA2 GLOB SERPL ELPH-MCNC: 12.2 % (ref 7–13)
ALPHA2 GLOB SERPL ELPH-MCNC: 14.2 % (ref 7–13)
ALT SERPL W P-5'-P-CCNC: 14 U/L (ref 12–78)
ALT SERPL W P-5'-P-CCNC: 181 U/L (ref 12–78)
ALT SERPL W P-5'-P-CCNC: 201 U/L (ref 12–78)
ALT SERPL W P-5'-P-CCNC: 23 U/L (ref 12–78)
ALT SERPL W P-5'-P-CCNC: 64 U/L (ref 12–78)
ANION GAP SERPL CALCULATED.3IONS-SCNC: 12 MMOL/L (ref 4–13)
ANION GAP SERPL CALCULATED.3IONS-SCNC: 17 MMOL/L (ref 4–13)
ANION GAP SERPL CALCULATED.3IONS-SCNC: 21 MMOL/L (ref 4–13)
ANION GAP SERPL CALCULATED.3IONS-SCNC: 5 MMOL/L (ref 4–13)
ANION GAP SERPL CALCULATED.3IONS-SCNC: 9 MMOL/L (ref 4–13)
APAP SERPL-MCNC: <2 UG/ML (ref 10–20)
APTT PPP: 31 SECONDS (ref 23–37)
APTT PPP: 32 SECONDS (ref 23–37)
APTT PPP: 36 SECONDS (ref 23–37)
ARTERIAL PATENCY WRIST A: YES
AST SERPL W P-5'-P-CCNC: 133 U/L (ref 5–45)
AST SERPL W P-5'-P-CCNC: 17 U/L (ref 5–45)
AST SERPL W P-5'-P-CCNC: 18 U/L (ref 5–45)
AST SERPL W P-5'-P-CCNC: 354 U/L (ref 5–45)
AST SERPL W P-5'-P-CCNC: 519 U/L (ref 5–45)
BACTERIA UR QL AUTO: ABNORMAL /HPF
BASE EXCESS BLDA CALC-SCNC: -0.2 MMOL/L
BASE EXCESS BLDA CALC-SCNC: -0.6 MMOL/L
BASE EXCESS BLDA CALC-SCNC: -1.5 MMOL/L
BASE EXCESS BLDA CALC-SCNC: -14.2 MMOL/L
BASE EXCESS BLDA CALC-SCNC: -2.5 MMOL/L
BASOPHILS # BLD AUTO: 0.08 THOUSANDS/ΜL (ref 0–0.1)
BASOPHILS # BLD AUTO: 0.08 THOUSANDS/ΜL (ref 0–0.1)
BASOPHILS # BLD MANUAL: 0 THOUSAND/UL (ref 0–0.1)
BASOPHILS NFR BLD AUTO: 1 % (ref 0–1)
BASOPHILS NFR BLD AUTO: 1 % (ref 0–1)
BASOPHILS NFR MAR MANUAL: 0 % (ref 0–1)
BETA GLOB ABNORMAL SERPL ELPH-MCNC: 0.3 G/DL (ref 0.4–0.8)
BETA GLOB ABNORMAL SERPL ELPH-MCNC: 0.36 G/DL (ref 0.4–0.8)
BETA1 GLOB SERPL ELPH-MCNC: 4.2 % (ref 5–13)
BETA1 GLOB SERPL ELPH-MCNC: 4.9 % (ref 5–13)
BETA2 GLOB SERPL ELPH-MCNC: 16.4 % (ref 2–8)
BETA2 GLOB SERPL ELPH-MCNC: 6 % (ref 2–8)
BETA2+GAMMA GLOB SERPL ELPH-MCNC: 0.43 G/DL (ref 0.2–0.5)
BETA2+GAMMA GLOB SERPL ELPH-MCNC: 1.2 G/DL (ref 0.2–0.5)
BILIRUB SERPL-MCNC: 0.5 MG/DL (ref 0.2–1)
BILIRUB SERPL-MCNC: 0.54 MG/DL (ref 0.2–1)
BILIRUB SERPL-MCNC: 0.65 MG/DL (ref 0.2–1)
BILIRUB SERPL-MCNC: 0.69 MG/DL (ref 0.2–1)
BILIRUB SERPL-MCNC: 0.74 MG/DL (ref 0.2–1)
BILIRUB UR QL STRIP: NEGATIVE
BLD GP AB SCN SERPL QL: POSITIVE
BLOOD GROUP ANTIBODIES SERPL: NORMAL
BLOOD GROUP ANTIBODIES SERPL: NORMAL
BODY TEMPERATURE: 36.2 DEGREES FEHRENHEIT
BUN SERPL-MCNC: 31 MG/DL (ref 5–25)
BUN SERPL-MCNC: 35 MG/DL (ref 5–25)
BUN SERPL-MCNC: 35 MG/DL (ref 5–25)
BUN SERPL-MCNC: 40 MG/DL (ref 5–25)
BUN SERPL-MCNC: 42 MG/DL (ref 5–25)
C DIFF TOX B TCDB STL QL NAA+PROBE: NEGATIVE
CA-I BLD-SCNC: 1.1 MMOL/L (ref 1.12–1.32)
CALCIUM ALBUM COR SERPL-MCNC: 10.2 MG/DL (ref 8.3–10.1)
CALCIUM ALBUM COR SERPL-MCNC: 10.3 MG/DL (ref 8.3–10.1)
CALCIUM ALBUM COR SERPL-MCNC: 9 MG/DL (ref 8.3–10.1)
CALCIUM ALBUM COR SERPL-MCNC: 9.9 MG/DL (ref 8.3–10.1)
CALCIUM ALBUM COR SERPL-MCNC: 9.9 MG/DL (ref 8.3–10.1)
CALCIUM SERPL-MCNC: 8.2 MG/DL (ref 8.3–10.1)
CALCIUM SERPL-MCNC: 8.5 MG/DL (ref 8.3–10.1)
CALCIUM SERPL-MCNC: 8.8 MG/DL (ref 8.3–10.1)
CALCIUM SERPL-MCNC: 8.9 MG/DL (ref 8.3–10.1)
CALCIUM SERPL-MCNC: 9 MG/DL (ref 8.3–10.1)
CHLORIDE SERPL-SCNC: 101 MMOL/L (ref 100–108)
CHLORIDE SERPL-SCNC: 102 MMOL/L (ref 100–108)
CHLORIDE SERPL-SCNC: 104 MMOL/L (ref 100–108)
CHLORIDE SERPL-SCNC: 104 MMOL/L (ref 100–108)
CHLORIDE SERPL-SCNC: 98 MMOL/L (ref 100–108)
CLARITY UR: ABNORMAL
CMV DNA SERPL NAA+PROBE-ACNC: NEGATIVE IU/ML
CMV DNA SERPL NAA+PROBE-LOG IU: NORMAL LOG10 IU/ML
CO2 SERPL-SCNC: 17 MMOL/L (ref 21–32)
CO2 SERPL-SCNC: 20 MMOL/L (ref 21–32)
CO2 SERPL-SCNC: 24 MMOL/L (ref 21–32)
CO2 SERPL-SCNC: 29 MMOL/L (ref 21–32)
CO2 SERPL-SCNC: 33 MMOL/L (ref 21–32)
COLOR UR: COLORLESS
CREAT SERPL-MCNC: 4.57 MG/DL (ref 0.6–1.3)
CREAT SERPL-MCNC: 5.25 MG/DL (ref 0.6–1.3)
CREAT SERPL-MCNC: 5.43 MG/DL (ref 0.6–1.3)
CREAT SERPL-MCNC: 5.48 MG/DL (ref 0.6–1.3)
CREAT SERPL-MCNC: 6.47 MG/DL (ref 0.6–1.3)
DAT POLY-SP REAG RBC QL: NEGATIVE
EOSINOPHIL # BLD AUTO: 0.64 THOUSAND/ΜL (ref 0–0.61)
EOSINOPHIL # BLD AUTO: 0.96 THOUSAND/ΜL (ref 0–0.61)
EOSINOPHIL # BLD MANUAL: 0.15 THOUSAND/UL (ref 0–0.4)
EOSINOPHIL NFR BLD AUTO: 10 % (ref 0–6)
EOSINOPHIL NFR BLD AUTO: 11 % (ref 0–6)
EOSINOPHIL NFR BLD MANUAL: 2 % (ref 0–6)
ERYTHROCYTE [DISTWIDTH] IN BLOOD BY AUTOMATED COUNT: 14.8 % (ref 11.6–15.1)
ERYTHROCYTE [DISTWIDTH] IN BLOOD BY AUTOMATED COUNT: 15.6 % (ref 11.6–15.1)
ERYTHROCYTE [DISTWIDTH] IN BLOOD BY AUTOMATED COUNT: 15.9 % (ref 11.6–15.1)
ERYTHROCYTE [DISTWIDTH] IN BLOOD BY AUTOMATED COUNT: 16.1 % (ref 11.6–15.1)
EST. AVERAGE GLUCOSE BLD GHB EST-MCNC: 82 MG/DL
ETHANOL SERPL-MCNC: <3 MG/DL (ref 0–3)
FLUAV RNA RESP QL NAA+PROBE: NEGATIVE
FLUBV RNA RESP QL NAA+PROBE: NEGATIVE
FRUCTOSAMINE SERPL-SCNC: 286 UMOL/L (ref 0–285)
GAMMA GLOB ABNORMAL SERPL ELPH-MCNC: 0.92 G/DL (ref 0.5–1.6)
GAMMA GLOB ABNORMAL SERPL ELPH-MCNC: 1.54 G/DL (ref 0.5–1.6)
GAMMA GLOB SERPL ELPH-MCNC: 12.6 % (ref 12–22)
GAMMA GLOB SERPL ELPH-MCNC: 21.7 % (ref 12–22)
GFR SERPL CREATININE-BSD FRML MDRD: 10 ML/MIN/1.73SQ M
GFR SERPL CREATININE-BSD FRML MDRD: 7 ML/MIN/1.73SQ M
GFR SERPL CREATININE-BSD FRML MDRD: 8 ML/MIN/1.73SQ M
GFR SERPL CREATININE-BSD FRML MDRD: 8 ML/MIN/1.73SQ M
GFR SERPL CREATININE-BSD FRML MDRD: 9 ML/MIN/1.73SQ M
GLUCOSE P FAST SERPL-MCNC: 104 MG/DL (ref 65–99)
GLUCOSE SERPL-MCNC: 156 MG/DL (ref 65–140)
GLUCOSE SERPL-MCNC: 171 MG/DL (ref 65–140)
GLUCOSE SERPL-MCNC: 173 MG/DL (ref 65–140)
GLUCOSE SERPL-MCNC: 175 MG/DL (ref 65–140)
GLUCOSE SERPL-MCNC: 179 MG/DL (ref 65–140)
GLUCOSE SERPL-MCNC: 187 MG/DL (ref 65–140)
GLUCOSE SERPL-MCNC: 209 MG/DL (ref 65–140)
GLUCOSE SERPL-MCNC: 223 MG/DL (ref 65–140)
GLUCOSE SERPL-MCNC: 243 MG/DL (ref 65–140)
GLUCOSE SERPL-MCNC: 473 MG/DL (ref 65–140)
GLUCOSE SERPL-MCNC: 56 MG/DL (ref 65–140)
GLUCOSE UR STRIP-MCNC: NEGATIVE MG/DL
HBA1C MFR BLD: 4.5 %
HCO3 BLDA-SCNC: 14.3 MMOL/L (ref 22–28)
HCO3 BLDA-SCNC: 21.5 MMOL/L (ref 22–28)
HCO3 BLDA-SCNC: 21.7 MMOL/L (ref 22–28)
HCO3 BLDA-SCNC: 21.8 MMOL/L (ref 22–28)
HCO3 BLDA-SCNC: 24.3 MMOL/L (ref 22–28)
HCT VFR BLD AUTO: 24.2 % (ref 34.8–46.1)
HCT VFR BLD AUTO: 24.7 % (ref 34.8–46.1)
HCT VFR BLD AUTO: 25.2 % (ref 34.8–46.1)
HCT VFR BLD AUTO: 27.2 % (ref 34.8–46.1)
HCT VFR BLD AUTO: 27.5 % (ref 34.8–46.1)
HCT VFR BLD AUTO: 28.2 % (ref 34.8–46.1)
HGB BLD-MCNC: 7 G/DL (ref 11.5–15.4)
HGB BLD-MCNC: 7.2 G/DL (ref 11.5–15.4)
HGB BLD-MCNC: 7.5 G/DL (ref 11.5–15.4)
HGB BLD-MCNC: 8.1 G/DL (ref 11.5–15.4)
HGB BLD-MCNC: 8.6 G/DL (ref 11.5–15.4)
HGB BLD-MCNC: 8.7 G/DL (ref 11.5–15.4)
HGB UR QL STRIP.AUTO: ABNORMAL
HOROWITZ INDEX BLDA+IHG-RTO: 40 MM[HG]
HOROWITZ INDEX BLDA+IHG-RTO: 60 MM[HG]
IGA SERPL-MCNC: 220 MG/DL (ref 70–400)
IGA SERPL-MCNC: 223 MG/DL (ref 70–400)
IGG SERPL-MCNC: 1650 MG/DL (ref 700–1600)
IGG SERPL-MCNC: 1740 MG/DL (ref 700–1600)
IGG/ALB SER: 0.89 {RATIO} (ref 1.1–1.8)
IGG/ALB SER: 0.9 {RATIO} (ref 1.1–1.8)
IGM SERPL-MCNC: 52 MG/DL (ref 40–230)
IGM SERPL-MCNC: 71 MG/DL (ref 40–230)
IMM GRANULOCYTES # BLD AUTO: 0.02 THOUSAND/UL (ref 0–0.2)
IMM GRANULOCYTES # BLD AUTO: 0.04 THOUSAND/UL (ref 0–0.2)
IMM GRANULOCYTES NFR BLD AUTO: 0 % (ref 0–2)
IMM GRANULOCYTES NFR BLD AUTO: 0 % (ref 0–2)
INR PPP: 1.43 (ref 0.84–1.19)
KAPPA LC FREE SER-MCNC: 308.6 MG/L (ref 3.3–19.4)
KAPPA LC FREE SER-MCNC: 430.2 MG/L (ref 3.3–19.4)
KAPPA LC FREE/LAMBDA FREE SER: 1.88 {RATIO} (ref 0.26–1.65)
KAPPA LC FREE/LAMBDA FREE SER: 2.02 {RATIO} (ref 0.26–1.65)
KETONES UR STRIP-MCNC: NEGATIVE MG/DL
LACTATE SERPL-SCNC: 13.5 MMOL/L (ref 0.5–2)
LACTATE SERPL-SCNC: 3.5 MMOL/L (ref 0.5–2)
LACTATE SERPL-SCNC: 4.6 MMOL/L (ref 0.5–2)
LACTATE SERPL-SCNC: 4.8 MMOL/L (ref 0.5–2)
LACTATE SERPL-SCNC: 6.4 MMOL/L (ref 0.5–2)
LAMBDA LC FREE SERPL-MCNC: 164.3 MG/L (ref 5.7–26.3)
LAMBDA LC FREE SERPL-MCNC: 213.1 MG/L (ref 5.7–26.3)
LEUKOCYTE ESTERASE UR QL STRIP: ABNORMAL
LYMPHOCYTES # BLD AUTO: 0.98 THOUSAND/UL (ref 0.6–4.47)
LYMPHOCYTES # BLD AUTO: 1.29 THOUSANDS/ΜL (ref 0.6–4.47)
LYMPHOCYTES # BLD AUTO: 1.56 THOUSANDS/ΜL (ref 0.6–4.47)
LYMPHOCYTES # BLD AUTO: 13 % (ref 14–44)
LYMPHOCYTES NFR BLD AUTO: 17 % (ref 14–44)
LYMPHOCYTES NFR BLD AUTO: 22 % (ref 14–44)
M PEAK ID 2: 3.7 %
M PEAK ID 2: 4.5 %
M PROTEIN 1 MFR SERPL ELPH: 2.2 %
M PROTEIN 1 MFR SERPL ELPH: 3.6 %
M PROTEIN 1 SERPL ELPH-MCNC: 0.16 G/DL
M PROTEIN 1 SERPL ELPH-MCNC: 0.26 G/DL
M PROTEIN 2 SERPL ELPH-MCNC: 0.26 G/DL
M PROTEIN 2 SERPL ELPH-MCNC: 0.33 G/DL
M PROTEIN 3 MFR SERPL ELPH: 2.7 %
M PROTEIN 3 MFR SERPL ELPH: 3 %
M PROTEIN 3 SERPL ELPH-MCNC: 0.19 G/DL
M PROTEIN 3 SERPL ELPH-MCNC: 0.22 G/DL
MAGNESIUM SERPL-MCNC: 2.3 MG/DL (ref 1.6–2.6)
MAGNESIUM SERPL-MCNC: 2.4 MG/DL (ref 1.6–2.6)
MAGNESIUM SERPL-MCNC: 2.5 MG/DL (ref 1.6–2.6)
MCH RBC QN AUTO: 34.1 PG (ref 26.8–34.3)
MCH RBC QN AUTO: 34.3 PG (ref 26.8–34.3)
MCH RBC QN AUTO: 34.4 PG (ref 26.8–34.3)
MCH RBC QN AUTO: 34.7 PG (ref 26.8–34.3)
MCH RBC QN AUTO: 35.8 PG (ref 26.8–34.3)
MCHC RBC AUTO-ENTMCNC: 27.8 G/DL (ref 31.4–37.4)
MCHC RBC AUTO-ENTMCNC: 29.8 G/DL (ref 31.4–37.4)
MCHC RBC AUTO-ENTMCNC: 30.4 G/DL (ref 31.4–37.4)
MCHC RBC AUTO-ENTMCNC: 30.9 G/DL (ref 31.4–37.4)
MCHC RBC AUTO-ENTMCNC: 31.3 G/DL (ref 31.4–37.4)
MCV RBC AUTO: 111 FL (ref 82–98)
MCV RBC AUTO: 114 FL (ref 82–98)
MCV RBC AUTO: 115 FL (ref 82–98)
MCV RBC AUTO: 116 FL (ref 82–98)
MCV RBC AUTO: 123 FL (ref 82–98)
METAMYELOCYTES NFR BLD MANUAL: 8 % (ref 0–1)
MONOCYTES # BLD AUTO: 0.08 THOUSAND/UL (ref 0–1.22)
MONOCYTES # BLD AUTO: 0.59 THOUSAND/ΜL (ref 0.17–1.22)
MONOCYTES # BLD AUTO: 0.8 THOUSAND/ΜL (ref 0.17–1.22)
MONOCYTES NFR BLD AUTO: 14 % (ref 4–12)
MONOCYTES NFR BLD AUTO: 6 % (ref 4–12)
MONOCYTES NFR BLD: 1 % (ref 4–12)
MYELOCYTES NFR BLD MANUAL: 1 % (ref 0–1)
NEUTROPHILS # BLD AUTO: 2.93 THOUSANDS/ΜL (ref 1.85–7.62)
NEUTROPHILS # BLD AUTO: 6.14 THOUSANDS/ΜL (ref 1.85–7.62)
NEUTROPHILS # BLD MANUAL: 5.68 THOUSAND/UL (ref 1.85–7.62)
NEUTS BAND NFR BLD MANUAL: 11 % (ref 0–8)
NEUTS SEG NFR BLD AUTO: 52 % (ref 43–75)
NEUTS SEG NFR BLD AUTO: 64 % (ref 43–75)
NEUTS SEG NFR BLD AUTO: 66 % (ref 43–75)
NITRITE UR QL STRIP: NEGATIVE
NON-SQ EPI CELLS URNS QL MICRO: ABNORMAL /HPF
NRBC BLD AUTO-RTO: 0 /100 WBCS
O2 CT BLDA-SCNC: 11.2 ML/DL (ref 16–23)
O2 CT BLDA-SCNC: 12.6 ML/DL (ref 16–23)
O2 CT BLDA-SCNC: 13.1 ML/DL (ref 16–23)
O2 CT BLDA-SCNC: 13.1 ML/DL (ref 16–23)
O2 CT BLDA-SCNC: 7.4 ML/DL (ref 16–23)
OXYHGB MFR BLDA: 56.8 % (ref 94–97)
OXYHGB MFR BLDA: 96.9 % (ref 94–97)
OXYHGB MFR BLDA: 97.5 % (ref 94–97)
OXYHGB MFR BLDA: 97.9 % (ref 94–97)
OXYHGB MFR BLDA: 99 % (ref 94–97)
PCO2 BLDA: 26.6 MM HG (ref 36–44)
PCO2 BLDA: 27.3 MM HG (ref 36–44)
PCO2 BLDA: 30.2 MM HG (ref 36–44)
PCO2 BLDA: 46.3 MM HG (ref 36–44)
PCO2 BLDA: 52.3 MM HG (ref 36–44)
PEEP RESPIRATORY: 6 CM[H2O]
PH BLDA: 7.11 [PH] (ref 7.35–7.45)
PH BLDA: 7.29 [PH] (ref 7.35–7.45)
PH BLDA: 7.47 [PH] (ref 7.35–7.45)
PH BLDA: 7.52 [PH] (ref 7.35–7.45)
PH BLDA: 7.53 [PH] (ref 7.35–7.45)
PH UR STRIP.AUTO: 8.5 [PH]
PHOSPHATE SERPL-MCNC: 3.1 MG/DL (ref 2.7–4.5)
PLATELET # BLD AUTO: 186 THOUSANDS/UL (ref 149–390)
PLATELET # BLD AUTO: 209 THOUSANDS/UL (ref 149–390)
PLATELET # BLD AUTO: 216 THOUSANDS/UL (ref 149–390)
PLATELET # BLD AUTO: 219 THOUSANDS/UL (ref 149–390)
PLATELET # BLD AUTO: 293 THOUSANDS/UL (ref 149–390)
PLATELET BLD QL SMEAR: ADEQUATE
PMV BLD AUTO: 11.7 FL (ref 8.9–12.7)
PMV BLD AUTO: 11.7 FL (ref 8.9–12.7)
PMV BLD AUTO: 12.3 FL (ref 8.9–12.7)
PMV BLD AUTO: 12.8 FL (ref 8.9–12.7)
PMV BLD AUTO: 12.9 FL (ref 8.9–12.7)
PO2 BLDA: 131.2 MM HG (ref 75–129)
PO2 BLDA: 148.2 MM HG (ref 75–129)
PO2 BLDA: 154.3 MM HG (ref 75–129)
PO2 BLDA: 179.2 MM HG (ref 75–129)
PO2 BLDA: 35.7 MM HG (ref 75–129)
POTASSIUM SERPL-SCNC: 4.7 MMOL/L (ref 3.5–5.3)
POTASSIUM SERPL-SCNC: 4.8 MMOL/L (ref 3.5–5.3)
POTASSIUM SERPL-SCNC: 4.8 MMOL/L (ref 3.5–5.3)
POTASSIUM SERPL-SCNC: 5.1 MMOL/L (ref 3.5–5.3)
POTASSIUM SERPL-SCNC: 5.3 MMOL/L (ref 3.5–5.3)
PROCALCITONIN SERPL-MCNC: 0.29 NG/ML
PROCALCITONIN SERPL-MCNC: 309.05 NG/ML
PROT PATTERN SERPL ELPH-IMP: ABNORMAL
PROT PATTERN SERPL ELPH-IMP: ABNORMAL
PROT SERPL-MCNC: 6.1 G/DL (ref 6.4–8.2)
PROT SERPL-MCNC: 6.3 G/DL (ref 6.4–8.2)
PROT SERPL-MCNC: 6.6 G/DL (ref 6.4–8.2)
PROT SERPL-MCNC: 7.1 G/DL (ref 6.4–8.2)
PROT SERPL-MCNC: 7.3 G/DL (ref 6.4–8.2)
PROT SERPL-MCNC: 7.4 G/DL (ref 6.4–8.2)
PROT SERPL-MCNC: 7.8 G/DL (ref 6.4–8.2)
PROT UR STRIP-MCNC: ABNORMAL MG/DL
PROTHROMBIN TIME: 17.5 SECONDS (ref 11.6–14.5)
PROTHROMBIN TIME: 17.5 SECONDS (ref 11.6–14.5)
PROTHROMBIN TIME: 17.6 SECONDS (ref 11.6–14.5)
RBC # BLD AUTO: 2.05 MILLION/UL (ref 3.81–5.12)
RBC # BLD AUTO: 2.09 MILLION/UL (ref 3.81–5.12)
RBC # BLD AUTO: 2.16 MILLION/UL (ref 3.81–5.12)
RBC # BLD AUTO: 2.4 MILLION/UL (ref 3.81–5.12)
RBC # BLD AUTO: 2.54 MILLION/UL (ref 3.81–5.12)
RBC #/AREA URNS AUTO: ABNORMAL /HPF
RH BLD: POSITIVE
RSV RNA RESP QL NAA+PROBE: NEGATIVE
SALICYLATES SERPL-MCNC: <3 MG/DL (ref 3–20)
SARS-COV-2 RNA RESP QL NAA+PROBE: NEGATIVE
SODIUM SERPL-SCNC: 136 MMOL/L (ref 136–145)
SODIUM SERPL-SCNC: 139 MMOL/L (ref 136–145)
SODIUM SERPL-SCNC: 140 MMOL/L (ref 136–145)
SODIUM SERPL-SCNC: 140 MMOL/L (ref 136–145)
SODIUM SERPL-SCNC: 141 MMOL/L (ref 136–145)
SP GR UR STRIP.AUTO: 1.01 (ref 1–1.03)
SPECIMEN EXPIRATION DATE: NORMAL
SPECIMEN SOURCE: ABNORMAL
T3FREE SERPL-MCNC: 1.16 PG/ML (ref 2.3–4.2)
T4 FREE SERPL-MCNC: 0.57 NG/DL (ref 0.76–1.46)
TACROLIMUS BLD-MCNC: 3.2 NG/ML (ref 2–20)
TOTAL CELLS COUNTED SPEC: 100
TROPONIN I SERPL-MCNC: 0.49 NG/ML
TROPONIN I SERPL-MCNC: 13.29 NG/ML
TROPONIN I SERPL-MCNC: 33.28 NG/ML
TROPONIN I SERPL-MCNC: >40 NG/ML
TSH SERPL DL<=0.05 MIU/L-ACNC: 2.93 UIU/ML (ref 0.36–3.74)
TSH SERPL DL<=0.05 MIU/L-ACNC: 3.25 UIU/ML (ref 0.36–3.74)
UROBILINOGEN UR QL STRIP.AUTO: 0.2 E.U./DL
VENT AC: 16
VENT AC: 16
VENT AC: 18
VENT AC: 22
VENT AC: 24
VENT- AC: AC
VT SETTING VENT: 380 ML
VT SETTING VENT: 450 ML
WBC # BLD AUTO: 5.76 THOUSAND/UL (ref 4.31–10.16)
WBC # BLD AUTO: 7.56 THOUSAND/UL (ref 4.31–10.16)
WBC # BLD AUTO: 7.57 THOUSAND/UL (ref 4.31–10.16)
WBC # BLD AUTO: 7.62 THOUSAND/UL (ref 4.31–10.16)
WBC # BLD AUTO: 9.37 THOUSAND/UL (ref 4.31–10.16)
WBC #/AREA URNS AUTO: ABNORMAL /HPF

## 2021-01-01 PROCEDURE — 84145 PROCALCITONIN (PCT): CPT | Performed by: EMERGENCY MEDICINE

## 2021-01-01 PROCEDURE — 83735 ASSAY OF MAGNESIUM: CPT | Performed by: PHYSICIAN ASSISTANT

## 2021-01-01 PROCEDURE — 99223 1ST HOSP IP/OBS HIGH 75: CPT | Performed by: INTERNAL MEDICINE

## 2021-01-01 PROCEDURE — 71045 X-RAY EXAM CHEST 1 VIEW: CPT

## 2021-01-01 PROCEDURE — 96401 CHEMO ANTI-NEOPL SQ/IM: CPT

## 2021-01-01 PROCEDURE — 84165 PROTEIN E-PHORESIS SERUM: CPT

## 2021-01-01 PROCEDURE — 99215 OFFICE O/P EST HI 40 MIN: CPT | Performed by: INTERNAL MEDICINE

## 2021-01-01 PROCEDURE — 36415 COLL VENOUS BLD VENIPUNCTURE: CPT

## 2021-01-01 PROCEDURE — 36415 COLL VENOUS BLD VENIPUNCTURE: CPT | Performed by: EMERGENCY MEDICINE

## 2021-01-01 PROCEDURE — G1004 CDSM NDSC: HCPCS

## 2021-01-01 PROCEDURE — 36620 INSERTION CATHETER ARTERY: CPT

## 2021-01-01 PROCEDURE — 94002 VENT MGMT INPAT INIT DAY: CPT

## 2021-01-01 PROCEDURE — 92950 HEART/LUNG RESUSCITATION CPR: CPT

## 2021-01-01 PROCEDURE — 03HY32Z INSERTION OF MONITORING DEVICE INTO UPPER ARTERY, PERCUTANEOUS APPROACH: ICD-10-PCS | Performed by: PHYSICIAN ASSISTANT

## 2021-01-01 PROCEDURE — 94003 VENT MGMT INPAT SUBQ DAY: CPT

## 2021-01-01 PROCEDURE — 85027 COMPLETE CBC AUTOMATED: CPT | Performed by: PHYSICIAN ASSISTANT

## 2021-01-01 PROCEDURE — 82784 ASSAY IGA/IGD/IGG/IGM EACH: CPT

## 2021-01-01 PROCEDURE — 83605 ASSAY OF LACTIC ACID: CPT | Performed by: PHYSICIAN ASSISTANT

## 2021-01-01 PROCEDURE — 92950 HEART/LUNG RESUSCITATION CPR: CPT | Performed by: EMERGENCY MEDICINE

## 2021-01-01 PROCEDURE — 83605 ASSAY OF LACTIC ACID: CPT | Performed by: EMERGENCY MEDICINE

## 2021-01-01 PROCEDURE — 85014 HEMATOCRIT: CPT | Performed by: PHYSICIAN ASSISTANT

## 2021-01-01 PROCEDURE — 83883 ASSAY NEPHELOMETRY NOT SPEC: CPT

## 2021-01-01 PROCEDURE — 80053 COMPREHEN METABOLIC PANEL: CPT | Performed by: EMERGENCY MEDICINE

## 2021-01-01 PROCEDURE — 86850 RBC ANTIBODY SCREEN: CPT | Performed by: INTERNAL MEDICINE

## 2021-01-01 PROCEDURE — C9113 INJ PANTOPRAZOLE SODIUM, VIA: HCPCS | Performed by: PHYSICIAN ASSISTANT

## 2021-01-01 PROCEDURE — 82985 ASSAY OF GLYCATED PROTEIN: CPT

## 2021-01-01 PROCEDURE — 70450 CT HEAD/BRAIN W/O DYE: CPT

## 2021-01-01 PROCEDURE — 94760 N-INVAS EAR/PLS OXIMETRY 1: CPT

## 2021-01-01 PROCEDURE — 86922 COMPATIBILITY TEST ANTIGLOB: CPT

## 2021-01-01 PROCEDURE — 84439 ASSAY OF FREE THYROXINE: CPT

## 2021-01-01 PROCEDURE — NC001 PR NO CHARGE: Performed by: INTERNAL MEDICINE

## 2021-01-01 PROCEDURE — 45390 COLONOSCOPY W/RESECTION: CPT | Performed by: INTERNAL MEDICINE

## 2021-01-01 PROCEDURE — 84145 PROCALCITONIN (PCT): CPT | Performed by: PHYSICIAN ASSISTANT

## 2021-01-01 PROCEDURE — 45380 COLONOSCOPY AND BIOPSY: CPT | Performed by: INTERNAL MEDICINE

## 2021-01-01 PROCEDURE — 80197 ASSAY OF TACROLIMUS: CPT

## 2021-01-01 PROCEDURE — 86921 COMPATIBILITY TEST INCUBATE: CPT

## 2021-01-01 PROCEDURE — 96365 THER/PROPH/DIAG IV INF INIT: CPT

## 2021-01-01 PROCEDURE — 90833 PSYTX W PT W E/M 30 MIN: CPT | Performed by: PSYCHIATRY & NEUROLOGY

## 2021-01-01 PROCEDURE — 99221 1ST HOSP IP/OBS SF/LOW 40: CPT | Performed by: INTERNAL MEDICINE

## 2021-01-01 PROCEDURE — 84484 ASSAY OF TROPONIN QUANT: CPT | Performed by: NURSE PRACTITIONER

## 2021-01-01 PROCEDURE — 82948 REAGENT STRIP/BLOOD GLUCOSE: CPT

## 2021-01-01 PROCEDURE — 84484 ASSAY OF TROPONIN QUANT: CPT | Performed by: PHYSICIAN ASSISTANT

## 2021-01-01 PROCEDURE — 94150 VITAL CAPACITY TEST: CPT

## 2021-01-01 PROCEDURE — NC001 PR NO CHARGE: Performed by: NURSE PRACTITIONER

## 2021-01-01 PROCEDURE — 80053 COMPREHEN METABOLIC PANEL: CPT | Performed by: PHYSICIAN ASSISTANT

## 2021-01-01 PROCEDURE — 85007 BL SMEAR W/DIFF WBC COUNT: CPT | Performed by: EMERGENCY MEDICINE

## 2021-01-01 PROCEDURE — 85610 PROTHROMBIN TIME: CPT | Performed by: PHYSICIAN ASSISTANT

## 2021-01-01 PROCEDURE — 99291 CRITICAL CARE FIRST HOUR: CPT | Performed by: EMERGENCY MEDICINE

## 2021-01-01 PROCEDURE — 82805 BLOOD GASES W/O2 SATURATION: CPT | Performed by: PHYSICIAN ASSISTANT

## 2021-01-01 PROCEDURE — 80179 DRUG ASSAY SALICYLATE: CPT | Performed by: EMERGENCY MEDICINE

## 2021-01-01 PROCEDURE — 0241U HB NFCT DS VIR RESP RNA 4 TRGT: CPT | Performed by: EMERGENCY MEDICINE

## 2021-01-01 PROCEDURE — 4A133B1 MONITORING OF ARTERIAL PRESSURE, PERIPHERAL, PERCUTANEOUS APPROACH: ICD-10-PCS | Performed by: PHYSICIAN ASSISTANT

## 2021-01-01 PROCEDURE — 82805 BLOOD GASES W/O2 SATURATION: CPT | Performed by: NURSE PRACTITIONER

## 2021-01-01 PROCEDURE — 86880 COOMBS TEST DIRECT: CPT | Performed by: INTERNAL MEDICINE

## 2021-01-01 PROCEDURE — 84481 FREE ASSAY (FT-3): CPT

## 2021-01-01 PROCEDURE — 80143 DRUG ASSAY ACETAMINOPHEN: CPT | Performed by: EMERGENCY MEDICINE

## 2021-01-01 PROCEDURE — 83735 ASSAY OF MAGNESIUM: CPT | Performed by: EMERGENCY MEDICINE

## 2021-01-01 PROCEDURE — 86870 RBC ANTIBODY IDENTIFICATION: CPT | Performed by: INTERNAL MEDICINE

## 2021-01-01 PROCEDURE — 88305 TISSUE EXAM BY PATHOLOGIST: CPT | Performed by: PATHOLOGY

## 2021-01-01 PROCEDURE — 99214 OFFICE O/P EST MOD 30 MIN: CPT | Performed by: INTERNAL MEDICINE

## 2021-01-01 PROCEDURE — 87081 CULTURE SCREEN ONLY: CPT | Performed by: PHYSICIAN ASSISTANT

## 2021-01-01 PROCEDURE — 96367 TX/PROPH/DG ADDL SEQ IV INF: CPT

## 2021-01-01 PROCEDURE — 99214 OFFICE O/P EST MOD 30 MIN: CPT | Performed by: PSYCHIATRY & NEUROLOGY

## 2021-01-01 PROCEDURE — 87040 BLOOD CULTURE FOR BACTERIA: CPT | Performed by: EMERGENCY MEDICINE

## 2021-01-01 PROCEDURE — 86900 BLOOD TYPING SEROLOGIC ABO: CPT | Performed by: INTERNAL MEDICINE

## 2021-01-01 PROCEDURE — 82330 ASSAY OF CALCIUM: CPT | Performed by: PHYSICIAN ASSISTANT

## 2021-01-01 PROCEDURE — 85027 COMPLETE CBC AUTOMATED: CPT | Performed by: EMERGENCY MEDICINE

## 2021-01-01 PROCEDURE — 80053 COMPREHEN METABOLIC PANEL: CPT

## 2021-01-01 PROCEDURE — 85025 COMPLETE CBC W/AUTO DIFF WBC: CPT

## 2021-01-01 PROCEDURE — 5A1935Z RESPIRATORY VENTILATION, LESS THAN 24 CONSECUTIVE HOURS: ICD-10-PCS | Performed by: PHYSICIAN ASSISTANT

## 2021-01-01 PROCEDURE — 85730 THROMBOPLASTIN TIME PARTIAL: CPT | Performed by: NURSE PRACTITIONER

## 2021-01-01 PROCEDURE — 4A133J1 MONITORING OF ARTERIAL PULSE, PERIPHERAL, PERCUTANEOUS APPROACH: ICD-10-PCS | Performed by: PHYSICIAN ASSISTANT

## 2021-01-01 PROCEDURE — 83036 HEMOGLOBIN GLYCOSYLATED A1C: CPT

## 2021-01-01 PROCEDURE — 82077 ASSAY SPEC XCP UR&BREATH IA: CPT | Performed by: EMERGENCY MEDICINE

## 2021-01-01 PROCEDURE — 84443 ASSAY THYROID STIM HORMONE: CPT

## 2021-01-01 PROCEDURE — 85730 THROMBOPLASTIN TIME PARTIAL: CPT | Performed by: PHYSICIAN ASSISTANT

## 2021-01-01 PROCEDURE — 36600 WITHDRAWAL OF ARTERIAL BLOOD: CPT

## 2021-01-01 PROCEDURE — 84165 PROTEIN E-PHORESIS SERUM: CPT | Performed by: PATHOLOGY

## 2021-01-01 PROCEDURE — 81001 URINALYSIS AUTO W/SCOPE: CPT | Performed by: EMERGENCY MEDICINE

## 2021-01-01 PROCEDURE — 84100 ASSAY OF PHOSPHORUS: CPT | Performed by: PHYSICIAN ASSISTANT

## 2021-01-01 PROCEDURE — 82805 BLOOD GASES W/O2 SATURATION: CPT | Performed by: EMERGENCY MEDICINE

## 2021-01-01 PROCEDURE — 85730 THROMBOPLASTIN TIME PARTIAL: CPT | Performed by: EMERGENCY MEDICINE

## 2021-01-01 PROCEDURE — 99291 CRITICAL CARE FIRST HOUR: CPT

## 2021-01-01 PROCEDURE — 93005 ELECTROCARDIOGRAM TRACING: CPT

## 2021-01-01 PROCEDURE — 85027 COMPLETE CBC AUTOMATED: CPT

## 2021-01-01 PROCEDURE — 99291 CRITICAL CARE FIRST HOUR: CPT | Performed by: PHYSICIAN ASSISTANT

## 2021-01-01 PROCEDURE — 84484 ASSAY OF TROPONIN QUANT: CPT | Performed by: EMERGENCY MEDICINE

## 2021-01-01 PROCEDURE — 85018 HEMOGLOBIN: CPT | Performed by: PHYSICIAN ASSISTANT

## 2021-01-01 PROCEDURE — 86901 BLOOD TYPING SEROLOGIC RH(D): CPT | Performed by: INTERNAL MEDICINE

## 2021-01-01 PROCEDURE — 85610 PROTHROMBIN TIME: CPT | Performed by: EMERGENCY MEDICINE

## 2021-01-01 PROCEDURE — 99291 CRITICAL CARE FIRST HOUR: CPT | Performed by: INTERNAL MEDICINE

## 2021-01-01 PROCEDURE — 85610 PROTHROMBIN TIME: CPT | Performed by: NURSE PRACTITIONER

## 2021-01-01 PROCEDURE — 74177 CT ABD & PELVIS W/CONTRAST: CPT

## 2021-01-01 PROCEDURE — 87493 C DIFF AMPLIFIED PROBE: CPT | Performed by: PHYSICIAN ASSISTANT

## 2021-01-01 PROCEDURE — 45385 COLONOSCOPY W/LESION REMOVAL: CPT | Performed by: INTERNAL MEDICINE

## 2021-01-01 PROCEDURE — 71275 CT ANGIOGRAPHY CHEST: CPT

## 2021-01-01 RX ORDER — FENTANYL CITRATE 50 UG/ML
100 INJECTION, SOLUTION INTRAMUSCULAR; INTRAVENOUS ONCE
Status: COMPLETED | OUTPATIENT
Start: 2021-01-01 | End: 2021-01-01

## 2021-01-01 RX ORDER — ARIPIPRAZOLE 30 MG/1
30 TABLET ORAL
Qty: 90 TABLET | Refills: 0 | Status: SHIPPED | OUTPATIENT
Start: 2021-01-01 | End: 2021-01-01 | Stop reason: SDUPTHER

## 2021-01-01 RX ORDER — HEPARIN SODIUM 5000 [USP'U]/ML
5000 INJECTION, SOLUTION INTRAVENOUS; SUBCUTANEOUS EVERY 8 HOURS SCHEDULED
Status: DISCONTINUED | OUTPATIENT
Start: 2021-01-01 | End: 2021-01-01

## 2021-01-01 RX ORDER — PANTOPRAZOLE SODIUM 40 MG/1
40 INJECTION, POWDER, FOR SOLUTION INTRAVENOUS EVERY 12 HOURS SCHEDULED
Status: DISCONTINUED | OUTPATIENT
Start: 2021-01-01 | End: 2021-01-01

## 2021-01-01 RX ORDER — PROPOFOL 10 MG/ML
INJECTION, EMULSION INTRAVENOUS CONTINUOUS PRN
Status: DISCONTINUED | OUTPATIENT
Start: 2021-01-01 | End: 2021-01-01

## 2021-01-01 RX ORDER — SODIUM CHLORIDE 9 MG/ML
20 INJECTION, SOLUTION INTRAVENOUS ONCE
Status: COMPLETED | OUTPATIENT
Start: 2021-01-01 | End: 2021-01-01

## 2021-01-01 RX ORDER — CALCIUM ACETATE 667 MG/1
667 CAPSULE ORAL
COMMUNITY
End: 2021-01-01 | Stop reason: HOSPADM

## 2021-01-01 RX ORDER — SODIUM CHLORIDE, SODIUM GLUCONATE, SODIUM ACETATE, POTASSIUM CHLORIDE, MAGNESIUM CHLORIDE, SODIUM PHOSPHATE, DIBASIC, AND POTASSIUM PHOSPHATE .53; .5; .37; .037; .03; .012; .00082 G/100ML; G/100ML; G/100ML; G/100ML; G/100ML; G/100ML; G/100ML
1000 INJECTION, SOLUTION INTRAVENOUS ONCE
Status: COMPLETED | OUTPATIENT
Start: 2021-01-01 | End: 2021-01-01

## 2021-01-01 RX ORDER — HEPARIN SODIUM 10000 [USP'U]/100ML
3-20 INJECTION, SOLUTION INTRAVENOUS
Status: DISCONTINUED | OUTPATIENT
Start: 2021-01-01 | End: 2021-01-01

## 2021-01-01 RX ORDER — HEPARIN SODIUM 1000 [USP'U]/ML
2000 INJECTION, SOLUTION INTRAVENOUS; SUBCUTANEOUS
Status: DISCONTINUED | OUTPATIENT
Start: 2021-01-01 | End: 2021-01-01

## 2021-01-01 RX ORDER — FENTANYL CITRATE 50 UG/ML
INJECTION, SOLUTION INTRAMUSCULAR; INTRAVENOUS
Status: COMPLETED
Start: 2021-01-01 | End: 2021-01-01

## 2021-01-01 RX ORDER — HYDROMORPHONE HCL/PF 1 MG/ML
1 SYRINGE (ML) INJECTION
Status: DISCONTINUED | OUTPATIENT
Start: 2021-01-01 | End: 2021-01-01 | Stop reason: HOSPADM

## 2021-01-01 RX ORDER — CHLORHEXIDINE GLUCONATE 0.12 MG/ML
15 RINSE ORAL EVERY 12 HOURS SCHEDULED
Status: DISCONTINUED | OUTPATIENT
Start: 2021-01-01 | End: 2021-01-01

## 2021-01-01 RX ORDER — LORAZEPAM 2 MG/ML
2 INJECTION INTRAMUSCULAR
Status: DISCONTINUED | OUTPATIENT
Start: 2021-01-01 | End: 2021-01-01 | Stop reason: HOSPADM

## 2021-01-01 RX ORDER — LIDOCAINE HYDROCHLORIDE 10 MG/ML
INJECTION, SOLUTION EPIDURAL; INFILTRATION; INTRACAUDAL; PERINEURAL AS NEEDED
Status: DISCONTINUED | OUTPATIENT
Start: 2021-01-01 | End: 2021-01-01

## 2021-01-01 RX ORDER — HEPARIN SODIUM 1000 [USP'U]/ML
4000 INJECTION, SOLUTION INTRAVENOUS; SUBCUTANEOUS ONCE
Status: COMPLETED | OUTPATIENT
Start: 2021-01-01 | End: 2021-01-01

## 2021-01-01 RX ORDER — DULOXETIN HYDROCHLORIDE 60 MG/1
60 CAPSULE, DELAYED RELEASE ORAL 2 TIMES DAILY
Qty: 180 CAPSULE | Refills: 2 | Status: SHIPPED | OUTPATIENT
Start: 2021-01-01 | End: 2021-01-01 | Stop reason: HOSPADM

## 2021-01-01 RX ORDER — METOPROLOL TARTRATE 50 MG/1
50 TABLET, FILM COATED ORAL EVERY 12 HOURS SCHEDULED
Status: DISCONTINUED | OUTPATIENT
Start: 2021-01-01 | End: 2021-01-01

## 2021-01-01 RX ORDER — SODIUM CHLORIDE 9 MG/ML
INJECTION, SOLUTION INTRAVENOUS CONTINUOUS PRN
Status: DISCONTINUED | OUTPATIENT
Start: 2021-01-01 | End: 2021-01-01

## 2021-01-01 RX ORDER — ACETAMINOPHEN 325 MG/1
650 TABLET ORAL ONCE
Status: CANCELLED | OUTPATIENT
Start: 2021-03-12

## 2021-01-01 RX ORDER — DIPHENOXYLATE HYDROCHLORIDE AND ATROPINE SULFATE 2.5; .025 MG/1; MG/1
1 TABLET ORAL 4 TIMES DAILY PRN
Qty: 30 TABLET | Refills: 0 | Status: SHIPPED | OUTPATIENT
Start: 2021-01-01 | End: 2021-01-01 | Stop reason: SDUPTHER

## 2021-01-01 RX ORDER — METHYLPREDNISOLONE 4 MG/1
20 TABLET ORAL
Qty: 5 TABLET | Refills: 11 | Status: SHIPPED | OUTPATIENT
Start: 2021-01-01 | End: 2021-01-01

## 2021-01-01 RX ORDER — FENTANYL CITRATE 50 UG/ML
INJECTION, SOLUTION INTRAMUSCULAR; INTRAVENOUS AS NEEDED
Status: DISCONTINUED | OUTPATIENT
Start: 2021-01-01 | End: 2021-01-01

## 2021-01-01 RX ORDER — PROPOFOL 10 MG/ML
INJECTION, EMULSION INTRAVENOUS AS NEEDED
Status: DISCONTINUED | OUTPATIENT
Start: 2021-01-01 | End: 2021-01-01

## 2021-01-01 RX ORDER — SODIUM CHLORIDE 9 MG/ML
20 INJECTION, SOLUTION INTRAVENOUS ONCE
Status: CANCELLED | OUTPATIENT
Start: 2021-01-01

## 2021-01-01 RX ORDER — ACETAMINOPHEN 325 MG/1
975 TABLET ORAL EVERY 6 HOURS
Status: DISCONTINUED | OUTPATIENT
Start: 2021-01-01 | End: 2021-01-01

## 2021-01-01 RX ORDER — DIPHENHYDRAMINE HCL 25 MG
25 TABLET ORAL ONCE
Status: CANCELLED | OUTPATIENT
Start: 2021-03-19

## 2021-01-01 RX ORDER — DULOXETIN HYDROCHLORIDE 60 MG/1
60 CAPSULE, DELAYED RELEASE ORAL 2 TIMES DAILY
Qty: 180 CAPSULE | Refills: 0 | Status: SHIPPED | OUTPATIENT
Start: 2021-01-01 | End: 2021-01-01 | Stop reason: SDUPTHER

## 2021-01-01 RX ORDER — LORAZEPAM 2 MG/1
2 TABLET ORAL 3 TIMES DAILY PRN
Qty: 90 TABLET | Refills: 1 | Status: SHIPPED | OUTPATIENT
Start: 2021-01-01 | End: 2021-01-01 | Stop reason: SDUPTHER

## 2021-01-01 RX ORDER — HYDRALAZINE HYDROCHLORIDE 20 MG/ML
10 INJECTION INTRAMUSCULAR; INTRAVENOUS ONCE
Status: COMPLETED | OUTPATIENT
Start: 2021-01-01 | End: 2021-01-01

## 2021-01-01 RX ORDER — SODIUM CHLORIDE, SODIUM GLUCONATE, SODIUM ACETATE, POTASSIUM CHLORIDE, MAGNESIUM CHLORIDE, SODIUM PHOSPHATE, DIBASIC, AND POTASSIUM PHOSPHATE .53; .5; .37; .037; .03; .012; .00082 G/100ML; G/100ML; G/100ML; G/100ML; G/100ML; G/100ML; G/100ML
75 INJECTION, SOLUTION INTRAVENOUS CONTINUOUS
Status: DISCONTINUED | OUTPATIENT
Start: 2021-01-01 | End: 2021-01-01

## 2021-01-01 RX ORDER — HEPARIN SODIUM 1000 [USP'U]/ML
4000 INJECTION, SOLUTION INTRAVENOUS; SUBCUTANEOUS
Status: DISCONTINUED | OUTPATIENT
Start: 2021-01-01 | End: 2021-01-01

## 2021-01-01 RX ORDER — DIPHENOXYLATE HYDROCHLORIDE AND ATROPINE SULFATE 2.5; .025 MG/1; MG/1
1 TABLET ORAL 4 TIMES DAILY PRN
Qty: 120 TABLET | Refills: 5
Start: 2021-01-01 | End: 2021-01-01 | Stop reason: SDUPTHER

## 2021-01-01 RX ORDER — ARIPIPRAZOLE 30 MG/1
30 TABLET ORAL
Qty: 90 TABLET | Refills: 2 | Status: SHIPPED | OUTPATIENT
Start: 2021-01-01 | End: 2021-01-01 | Stop reason: HOSPADM

## 2021-01-01 RX ORDER — FENTANYL CITRATE 50 UG/ML
50 INJECTION, SOLUTION INTRAMUSCULAR; INTRAVENOUS ONCE
Status: COMPLETED | OUTPATIENT
Start: 2021-01-01 | End: 2021-01-01

## 2021-01-01 RX ORDER — INSULIN GLARGINE 300 U/ML
6 INJECTION, SOLUTION SUBCUTANEOUS
Qty: 2 PEN | Refills: 2 | Status: SHIPPED | OUTPATIENT
Start: 2021-01-01 | End: 2021-01-01 | Stop reason: HOSPADM

## 2021-01-01 RX ORDER — LOPERAMIDE HYDROCHLORIDE 2 MG/1
2 TABLET ORAL 4 TIMES DAILY PRN
Qty: 30 TABLET | Refills: 3 | Status: SHIPPED | OUTPATIENT
Start: 2021-01-01 | End: 2021-01-01 | Stop reason: HOSPADM

## 2021-01-01 RX ORDER — ALBUMIN, HUMAN INJ 5% 5 %
25 SOLUTION INTRAVENOUS ONCE
Status: COMPLETED | OUTPATIENT
Start: 2021-01-01 | End: 2021-01-01

## 2021-01-01 RX ORDER — ACETAMINOPHEN 325 MG/1
650 TABLET ORAL ONCE
Status: CANCELLED | OUTPATIENT
Start: 2021-03-19

## 2021-01-01 RX ORDER — GLYCOPYRROLATE 0.2 MG/ML
0.2 INJECTION INTRAMUSCULAR; INTRAVENOUS
Status: DISCONTINUED | OUTPATIENT
Start: 2021-01-01 | End: 2021-01-01 | Stop reason: HOSPADM

## 2021-01-01 RX ORDER — SODIUM CHLORIDE 9 MG/ML
20 INJECTION, SOLUTION INTRAVENOUS ONCE
Status: CANCELLED | OUTPATIENT
Start: 2021-03-12

## 2021-01-01 RX ORDER — DIPHENOXYLATE HYDROCHLORIDE AND ATROPINE SULFATE 2.5; .025 MG/1; MG/1
1 TABLET ORAL 4 TIMES DAILY PRN
Qty: 120 TABLET | Refills: 5 | Status: SHIPPED | OUTPATIENT
Start: 2021-01-01 | End: 2021-01-01 | Stop reason: HOSPADM

## 2021-01-01 RX ORDER — LORAZEPAM 2 MG/1
2 TABLET ORAL 3 TIMES DAILY PRN
Qty: 90 TABLET | Refills: 3 | Status: SHIPPED | OUTPATIENT
Start: 2021-03-27 | End: 2021-01-01 | Stop reason: HOSPADM

## 2021-01-01 RX ORDER — CALCIUM CHLORIDE 100 MG/ML
SYRINGE (ML) INTRAVENOUS CODE/TRAUMA/SEDATION MEDICATION
Status: COMPLETED | OUTPATIENT
Start: 2021-01-01 | End: 2021-01-01

## 2021-01-01 RX ORDER — PREDNISONE 1 MG/1
TABLET ORAL
Qty: 90 TABLET | Refills: 3
Start: 2021-01-01 | End: 2021-01-01 | Stop reason: HOSPADM

## 2021-01-01 RX ORDER — SODIUM CHLORIDE, SODIUM GLUCONATE, SODIUM ACETATE, POTASSIUM CHLORIDE, MAGNESIUM CHLORIDE, SODIUM PHOSPHATE, DIBASIC, AND POTASSIUM PHOSPHATE .53; .5; .37; .037; .03; .012; .00082 G/100ML; G/100ML; G/100ML; G/100ML; G/100ML; G/100ML; G/100ML
125 INJECTION, SOLUTION INTRAVENOUS CONTINUOUS
Status: DISCONTINUED | OUTPATIENT
Start: 2021-01-01 | End: 2021-01-01

## 2021-01-01 RX ORDER — ACETAMINOPHEN 325 MG/1
650 TABLET ORAL ONCE
Status: CANCELLED | OUTPATIENT
Start: 2021-01-01

## 2021-01-01 RX ORDER — ACETAMINOPHEN 325 MG/1
650 TABLET ORAL ONCE
Status: COMPLETED | OUTPATIENT
Start: 2021-01-01 | End: 2021-01-01

## 2021-01-01 RX ORDER — FENTANYL CITRATE 50 UG/ML
50 INJECTION, SOLUTION INTRAMUSCULAR; INTRAVENOUS
Status: DISCONTINUED | OUTPATIENT
Start: 2021-01-01 | End: 2021-01-01 | Stop reason: HOSPADM

## 2021-01-01 RX ORDER — LORAZEPAM 2 MG/ML
2 INJECTION INTRAMUSCULAR
Status: DISCONTINUED | OUTPATIENT
Start: 2021-01-01 | End: 2021-01-01

## 2021-01-01 RX ORDER — BUSPIRONE HYDROCHLORIDE 15 MG/1
15 TABLET ORAL 2 TIMES DAILY
Qty: 180 TABLET | Refills: 2 | Status: SHIPPED | OUTPATIENT
Start: 2021-01-01 | End: 2021-01-01 | Stop reason: HOSPADM

## 2021-01-01 RX ORDER — LORAZEPAM 2 MG/ML
0.5 INJECTION INTRAMUSCULAR EVERY 6 HOURS PRN
Status: DISCONTINUED | OUTPATIENT
Start: 2021-01-01 | End: 2021-01-01

## 2021-01-01 RX ORDER — LORAZEPAM 2 MG/ML
0.5 INJECTION INTRAMUSCULAR EVERY 6 HOURS PRN
Status: CANCELLED
Start: 2021-01-01

## 2021-01-01 RX ORDER — TACROLIMUS 0.5 MG/1
1 CAPSULE ORAL 2 TIMES DAILY
Qty: 90 CAPSULE | Refills: 3 | Status: SHIPPED | OUTPATIENT
Start: 2021-01-01 | End: 2021-01-01 | Stop reason: HOSPADM

## 2021-01-01 RX ORDER — LEVOTHYROXINE SODIUM 0.12 MG/1
125 TABLET ORAL
Status: DISCONTINUED | OUTPATIENT
Start: 2021-01-01 | End: 2021-01-01

## 2021-01-01 RX ORDER — EPINEPHRINE 0.1 MG/ML
SYRINGE (ML) INJECTION CODE/TRAUMA/SEDATION MEDICATION
Status: COMPLETED | OUTPATIENT
Start: 2021-01-01 | End: 2021-01-01

## 2021-01-01 RX ORDER — SERTRALINE HYDROCHLORIDE 100 MG/1
200 TABLET, FILM COATED ORAL DAILY
Qty: 180 TABLET | Refills: 2 | Status: SHIPPED | OUTPATIENT
Start: 2021-01-01 | End: 2021-01-01 | Stop reason: HOSPADM

## 2021-01-01 RX ORDER — TORSEMIDE 20 MG/1
20 TABLET ORAL DAILY
Qty: 90 TABLET | Refills: 3 | Status: SHIPPED | OUTPATIENT
Start: 2021-01-01 | End: 2021-01-01 | Stop reason: HOSPADM

## 2021-01-01 RX ADMIN — CALCIUM CHLORIDE 1 G: 100 INJECTION PARENTERAL at 18:24

## 2021-01-01 RX ADMIN — PANTOPRAZOLE SODIUM 40 MG: 40 INJECTION, POWDER, FOR SOLUTION INTRAVENOUS at 10:17

## 2021-01-01 RX ADMIN — GLYCOPYRROLATE 0.2 MG: 0.2 INJECTION, SOLUTION INTRAMUSCULAR; INTRAVENOUS at 14:20

## 2021-01-01 RX ADMIN — ALBUMIN (HUMAN) 25 G: 12.5 INJECTION, SOLUTION INTRAVENOUS at 05:22

## 2021-01-01 RX ADMIN — PROPOFOL 100 MG: 10 INJECTION, EMULSION INTRAVENOUS at 11:10

## 2021-01-01 RX ADMIN — NICARDIPINE HYDROCHLORIDE 5 MG/HR: 2.5 INJECTION, SOLUTION INTRAVENOUS at 08:46

## 2021-01-01 RX ADMIN — HYDRALAZINE HYDROCHLORIDE 10 MG: 20 INJECTION INTRAMUSCULAR; INTRAVENOUS at 05:30

## 2021-01-01 RX ADMIN — DIPHENHYDRAMINE HYDROCHLORIDE 25 MG: 50 INJECTION, SOLUTION INTRAMUSCULAR; INTRAVENOUS at 08:56

## 2021-01-01 RX ADMIN — FENTANYL CITRATE 50 MCG: 50 INJECTION INTRAMUSCULAR; INTRAVENOUS at 11:10

## 2021-01-01 RX ADMIN — FENTANYL CITRATE 50 MCG: 50 INJECTION, SOLUTION INTRAMUSCULAR; INTRAVENOUS at 22:10

## 2021-01-01 RX ADMIN — LORAZEPAM 2 MG: 2 INJECTION INTRAMUSCULAR; INTRAVENOUS at 14:19

## 2021-01-01 RX ADMIN — HEPARIN SODIUM 11.8 UNITS/KG/HR: 10000 INJECTION, SOLUTION INTRAVENOUS at 10:18

## 2021-01-01 RX ADMIN — SODIUM CHLORIDE, SODIUM GLUCONATE, SODIUM ACETATE, POTASSIUM CHLORIDE, MAGNESIUM CHLORIDE, SODIUM PHOSPHATE, DIBASIC, AND POTASSIUM PHOSPHATE 1000 ML: .53; .5; .37; .037; .03; .012; .00082 INJECTION, SOLUTION INTRAVENOUS at 21:00

## 2021-01-01 RX ADMIN — VANCOMYCIN HYDROCHLORIDE 1500 MG: 5 INJECTION, POWDER, LYOPHILIZED, FOR SOLUTION INTRAVENOUS at 19:37

## 2021-01-01 RX ADMIN — SODIUM CHLORIDE: 0.9 INJECTION, SOLUTION INTRAVENOUS at 10:53

## 2021-01-01 RX ADMIN — EPINEPHRINE 1 MG: 0.1 INJECTION, SOLUTION ENDOTRACHEAL; INTRACARDIAC; INTRAVENOUS at 17:52

## 2021-01-01 RX ADMIN — DEXAMETHASONE SODIUM PHOSPHATE 20 MG: 10 INJECTION, SOLUTION INTRAMUSCULAR; INTRAVENOUS at 08:35

## 2021-01-01 RX ADMIN — NOREPINEPHRINE BITARTRATE 1 MCG/MIN: 1 INJECTION INTRAVENOUS at 19:40

## 2021-01-01 RX ADMIN — Medication 0.6 MG: at 11:30

## 2021-01-01 RX ADMIN — HEPARIN SODIUM 5000 UNITS: 5000 INJECTION INTRAVENOUS; SUBCUTANEOUS at 22:50

## 2021-01-01 RX ADMIN — SODIUM CHLORIDE 4 UNITS/HR: 9 INJECTION, SOLUTION INTRAVENOUS at 01:58

## 2021-01-01 RX ADMIN — HEPARIN SODIUM 4000 UNITS: 1000 INJECTION INTRAVENOUS; SUBCUTANEOUS at 10:17

## 2021-01-01 RX ADMIN — BUSPIRONE HYDROCHLORIDE 15 MG: 5 TABLET ORAL at 22:21

## 2021-01-01 RX ADMIN — PROPOFOL 50 MG: 10 INJECTION, EMULSION INTRAVENOUS at 11:36

## 2021-01-01 RX ADMIN — LORAZEPAM 0.5 MG: 2 INJECTION INTRAMUSCULAR; INTRAVENOUS at 09:11

## 2021-01-01 RX ADMIN — CHLORHEXIDINE GLUCONATE 0.12% ORAL RINSE 15 ML: 1.2 LIQUID ORAL at 22:21

## 2021-01-01 RX ADMIN — ACETAMINOPHEN 975 MG: 325 TABLET, FILM COATED ORAL at 22:20

## 2021-01-01 RX ADMIN — HEPARIN SODIUM 5000 UNITS: 5000 INJECTION INTRAVENOUS; SUBCUTANEOUS at 05:22

## 2021-01-01 RX ADMIN — METRONIDAZOLE 500 MG: 500 INJECTION, SOLUTION INTRAVENOUS at 10:17

## 2021-01-01 RX ADMIN — CHLORHEXIDINE GLUCONATE 0.12% ORAL RINSE 15 ML: 1.2 LIQUID ORAL at 10:17

## 2021-01-01 RX ADMIN — SODIUM CHLORIDE 20 ML/HR: 9 INJECTION, SOLUTION INTRAVENOUS at 08:35

## 2021-01-01 RX ADMIN — IODIXANOL 100 ML: 320 INJECTION, SOLUTION INTRAVASCULAR at 12:09

## 2021-01-01 RX ADMIN — FENTANYL CITRATE 50 MCG: 50 INJECTION INTRAMUSCULAR; INTRAVENOUS at 11:16

## 2021-01-01 RX ADMIN — EPINEPHRINE 1 MG: 0.1 INJECTION, SOLUTION ENDOTRACHEAL; INTRACARDIAC; INTRAVENOUS at 17:55

## 2021-01-01 RX ADMIN — METRONIDAZOLE 500 MG: 500 INJECTION, SOLUTION INTRAVENOUS at 19:14

## 2021-01-01 RX ADMIN — CEFEPIME HYDROCHLORIDE 2000 MG: 2 INJECTION, POWDER, FOR SOLUTION INTRAVENOUS at 18:45

## 2021-01-01 RX ADMIN — SODIUM BICARBONATE 50 MEQ: 84 INJECTION, SOLUTION INTRAVENOUS at 23:00

## 2021-01-01 RX ADMIN — PANTOPRAZOLE SODIUM 40 MG: 40 INJECTION, POWDER, FOR SOLUTION INTRAVENOUS at 22:22

## 2021-01-01 RX ADMIN — BUSPIRONE HYDROCHLORIDE 15 MG: 5 TABLET ORAL at 05:22

## 2021-01-01 RX ADMIN — HYDROMORPHONE HYDROCHLORIDE 1 MG: 1 INJECTION, SOLUTION INTRAMUSCULAR; INTRAVENOUS; SUBCUTANEOUS at 14:37

## 2021-01-01 RX ADMIN — EPINEPHRINE 1 MG: 0.1 INJECTION, SOLUTION ENDOTRACHEAL; INTRACARDIAC; INTRAVENOUS at 17:49

## 2021-01-01 RX ADMIN — INSULIN LISPRO 1 UNITS: 100 INJECTION, SOLUTION INTRAVENOUS; SUBCUTANEOUS at 22:57

## 2021-01-01 RX ADMIN — FENTANYL CITRATE 100 MCG: 50 INJECTION INTRAMUSCULAR; INTRAVENOUS at 03:19

## 2021-01-01 RX ADMIN — HYDRALAZINE HYDROCHLORIDE 10 MG: 20 INJECTION INTRAMUSCULAR; INTRAVENOUS at 00:45

## 2021-01-01 RX ADMIN — FENTANYL CITRATE 100 MCG: 50 INJECTION, SOLUTION INTRAMUSCULAR; INTRAVENOUS at 03:19

## 2021-01-01 RX ADMIN — DARATUMUMAB AND HYALURONIDASE-FIHJ (HUMAN RECOMBINANT) 1800 MG: 1800; 30000 INJECTION SUBCUTANEOUS at 10:25

## 2021-01-01 RX ADMIN — PHENYLEPHRINE HYDROCHLORIDE 200 MCG: 10 INJECTION INTRAVENOUS at 11:49

## 2021-01-01 RX ADMIN — FENTANYL CITRATE 50 MCG: 50 INJECTION INTRAMUSCULAR; INTRAVENOUS at 22:10

## 2021-01-01 RX ADMIN — CEFEPIME 1000 MG: 1 INJECTION, POWDER, FOR SOLUTION INTRAMUSCULAR; INTRAVENOUS at 06:41

## 2021-01-01 RX ADMIN — LEVOTHYROXINE SODIUM 125 MCG: 125 TABLET ORAL at 05:22

## 2021-01-01 RX ADMIN — PROPOFOL 100 MCG/KG/MIN: 10 INJECTION, EMULSION INTRAVENOUS at 11:10

## 2021-01-01 RX ADMIN — ACETAMINOPHEN 975 MG: 325 TABLET, FILM COATED ORAL at 04:25

## 2021-01-01 RX ADMIN — ACETAMINOPHEN 975 MG: 325 TABLET, FILM COATED ORAL at 10:16

## 2021-01-01 RX ADMIN — SODIUM CHLORIDE, SODIUM LACTATE, POTASSIUM CHLORIDE, AND CALCIUM CHLORIDE 500 ML: .6; .31; .03; .02 INJECTION, SOLUTION INTRAVENOUS at 10:09

## 2021-01-01 RX ADMIN — SODIUM CHLORIDE, SODIUM GLUCONATE, SODIUM ACETATE, POTASSIUM CHLORIDE, MAGNESIUM CHLORIDE, SODIUM PHOSPHATE, DIBASIC, AND POTASSIUM PHOSPHATE 75 ML/HR: .53; .5; .37; .037; .03; .012; .00082 INJECTION, SOLUTION INTRAVENOUS at 02:45

## 2021-01-01 RX ADMIN — FENTANYL CITRATE 50 MCG: 50 INJECTION INTRAMUSCULAR; INTRAVENOUS at 14:19

## 2021-01-01 RX ADMIN — SODIUM BICARBONATE 50 MEQ: 84 INJECTION, SOLUTION INTRAVENOUS at 21:20

## 2021-01-01 RX ADMIN — METRONIDAZOLE 500 MG: 500 INJECTION, SOLUTION INTRAVENOUS at 00:53

## 2021-01-01 RX ADMIN — ACETAMINOPHEN 650 MG: 325 TABLET, FILM COATED ORAL at 09:00

## 2021-01-01 RX ADMIN — LIDOCAINE HYDROCHLORIDE 50 MG: 10 INJECTION, SOLUTION EPIDURAL; INFILTRATION; INTRACAUDAL; PERINEURAL at 11:10

## 2021-01-06 NOTE — PROGRESS NOTES
Progress Note- Robert Stevenson 63 y o  female MRN: [id number]  Unit/Bed#:  Encounter: [id number]    Assessment and Plan: Kandice Arceo a 63 y o  female with PMH:  End-stage renal disease on HD, multiple myeloma on Revlimid, renal transplant on tacrolimus and prednisone, failed pancreatic transplant, severe esophagitis who presented for intractable nausea abdominal pain and worsening diarrhea       #Abdominal Pain  #Chronic Diarrhea  Patient continues to persist with abdominal discomfort and diarrhea  She states that while she mostly of constipation roughly years ago she believes that when she started the Revlimid that she did notice increased bowel frequency  Continues to have nighttime diarrhea  Given her high immunocompromised state as well as previously failed pancreatic transplant in known type 1 diabetes will proceed with colonoscopy with terminal ileum and colonic biopsies  Will also get EGD  She is at significantly increased risk for CMV and other infections  Tacrolimus levels appropriate       Plan:  -repeat EGD with panbiopsies, as well as ensure healing of ulcers as below  -repeat colonoscopy, get ileocolonic biopsies  -TSH 2 6 normal   -C-diff, stool bacterial panel, giardia, stool O+P - all negative  -f/u fecal elastase  -continue Lomotil    #LA Class D Esophagitis  #[place] Ulcers  -continue protonix 40mg BID  -repeat EGD as above    #Large Duodenal Ulcers  #Infrarenal abdominal aorta, common iliac atherosclerosis  Patient underwent CTA did show evidence extensive atherosclerotic plaque causing mild luminal narrowing  Suspect some of her ulcers could be in setting of ischemic insult given her complicated anatomical history with previous pancreatic transplant, recently started dialysis  Gastrin level - 126     #Type 1 DM2  A1c 5 2 most recently well controlled  Could be playing role in her change of bowel habits  ______________________________________________________________________    Subjective:  Patient continues to have complaints of diarrhea this been going for the last year  States prior to this had history of significant constipation  Underwent infectious workup with negative C diff, Giardia, ova and parasites  Enteric bacterial stool panel was negative as well  Patient has a complicated history with known transplant has been on prednisone 5 mg as well as tacrolimus  States that when she started taking her Revlimid last year she noticed that she started having significant output  She has been held off for the last 3 weeks S has noticed a little bit of improvement but still continues to have diarrhea  Patient underwent upper endoscopy on 09/20/2020 and was found have LA class D esophagitis with multiple esophageal ulcers  In addition to multiple esophageal ulcers, large crater duodenal ulcers all suspected in the setting of possible ischemic injury  Patient states he has been a significant time since she last had a colonoscopy she says it has been over 5 years  CT A/P Septmeber 2020 -   1  There is perinephric stranding about the left lower quadrant transplant kidney concerning for pyelonephritis  2   Moreno catheter within the bladder and mild perivesicular fat stranding likely due to cystitis  3   Mild thickening of the distal esophagus may be due to esophagitis  4   Moderate thickening of the 2nd-3rd portion of the duodenum with surrounding fluid may be due to duodenitis  5   Thickening of the ascending colon with surrounding fat stranding may be due to nonspecific colitis versus reactive inflammatory change due the adjacent duodenum  6   Stable pericardial effusion  Objective:     Vitals: Blood pressure 158/71, pulse 68, temperature 97 5 °F (36 4 °C), temperature source Tympanic, height 5' 8" (1 727 m), weight 88 4 kg (194 lb 12 8 oz)  ,Body mass index is 29 62 kg/m²      Physical Exam: General Appearance:   Alert, cooperative, no distress   HEENT:   Normocephalic, atraumatic, anicteric  Neck:  Supple, symmetrical, trachea midline   Lungs:   Clear to auscultation bilaterally; no rales, rhonchi or wheezing; respirations unlabored    Heart[de-identified]   Regular rate and rhythm; no murmur, rub, or gallop  Abdomen:    soft mildly tender to palpation   Genitalia:   Deferred    Rectal:   Deferred    Extremities:    Fistula   Pulses:  2+ and symmetric all extremities    Skin:  No jaundice, rashes, or lesions    Lymph nodes:  No palpable cervical lymphadenopathy        Invasive Devices     Line            Hemodialysis AV Fistula 07/13/20 Right Upper arm 177 days                Lab Results:  No visits with results within 1 Day(s) from this visit  Latest known visit with results is:   Orders Only on 12/14/2020   Component Date Value    SARS-CoV-2  12/14/2020 Not Detected        Imaging Studies: I have personally reviewed pertinent imaging studies        ---------------------------------------------------  Note Electronically Signed By:    MD Christi Lord 72 Gastroenterology Fellow PGY-5  [id number] #: [id number]

## 2021-01-06 NOTE — PATIENT INSTRUCTIONS
Colon/egd on 2/3/21 with Dr Allen Warren at [facility]/dulcolax prep instructions given to the patient in the office by Angeline Alexander

## 2021-01-08 NOTE — TELEPHONE ENCOUNTER
Patients GI provider:  Dr Raulito Prescott    Number to return call: (  [phone]    Reason for call: Pt calling to ask if the doctor can order atropine instead of imodium    Scheduled procedure/appointment date if applicable: Apt/procedure 2-3-21

## 2021-01-08 NOTE — TELEPHONE ENCOUNTER
Patient hx chronic diarrhea, abdominal pain, end stage renal disease on HD, multiple myeloma, renal transplant, failed pancreatic transplant, esophagitis    Please see attached-  Is this an option?

## 2021-01-18 NOTE — TELEPHONE ENCOUNTER
Crystal called and LVM on nursing line  She said the pharmacy told her Dr Karyna Price would not refill Aripiprazole  She was wondering why  If there is no problem please send prescription for Aripiprazole to pharmacy   Next appointment is 2/26/21

## 2021-01-22 NOTE — TELEPHONE ENCOUNTER
GI Physician: Dr Edwar Flores for Medication: lomotil    Dose: 2 5-0 025 tab    Quantity:   30 day supply with refills          Pharmacy and Location: [facility] [id number]

## 2021-01-27 NOTE — TELEPHONE ENCOUNTER
Patient called to cancel her appt today with Dr Barragan  Patient will call back to reschedule  Patient's father just passed away

## 2021-02-08 NOTE — TELEPHONE ENCOUNTER
Appointment Confirmation     Appointment with  Kane Alonzo    Appointment date & time 2-17-21 @ 11:00am    Location [place]   Patient verbilized Understanding

## 2021-02-08 NOTE — TELEPHONE ENCOUNTER
daMedication Refill     Who is Calling  Patient    Medication lenalidomide (Revlimid) 2 5 MG CAPS      How many pills left 0   Preferred [facility]    Call back number [phone]   Relevant Information

## 2021-02-10 NOTE — TELEPHONE ENCOUNTER
She wont be able to have the labs done until after her appointment as it wont be a full 3 months since her last appointment  I will order the labs at that visit

## 2021-02-15 NOTE — LETTER
February 15, 2021     Diane Bradford, [address]    Patient: Georgette Peng   YOB: 1965   Date of Visit: 2/15/2021       Dear Dr Annie Simons:    Thank you for referring Georgette Peng to me for evaluation  Below are my notes for this consultation  If you have questions, please do not hesitate to call me  I look forward to following your patient along with you  Sincerely,        Nilesh Howell MD        CC: No Recipients  Nilesh Howell MD  2/15/2021  3:14 PM  Sign when Signing Visit  NEPHROLOGY OFFICE VISIT   Georgette Peng 63 y o  female MRN: [id number]  2/15/2021    Reason for Visit: renal transplant    ASSESSMENT and PLAN:    I had the pleasure of seeing Ms Hilda Aguayo today in the renal clinic for the continued management of renal transplant  61 yo female with PMHx of renal pancreas transplant 1998, DM I, failed pancreas transplant 2017, HTN, recurrent UTI/pyelo, recurrent, 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, Did require dialysis in 2014, orthostasis, anemia, hypothyroid, 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, dCHF, aortic regurg, dCHF grade I, zoster in July, subclavian art stenosis on Left presents for follow-up visit for renal transplant     Patient has had multiple admissions and issues recently     9/2017 -acute kidney injury, urinary frequency   Treated initially for UTI      10/2017 -urinary incontinence   Was started on antibiotics for possible UTI   Was started on Bactrim prophylaxis      11/2017 -abdominal pain   Nausea   Confusion   Depression and anxiety   Patient had EGD which showed gastritis   Given volume expansion        2/2018-depression and anxiety   Treated for UTI also        07/2018-treated for urinary tract infection; also was seen in the ER on 07/23 for periorbital cellulitis     9/2018 - renal artery study at [facility] - patent renal artery and anastamosis that did not require intervention     March/april 2019 - saw Hematology  Rising kappa light chain  BM biopsy was recommended       4/16/19 - had foot abscess and treated with antibiotics       4/17/19 - BM biopsy completed  Progressing myeloma cells in BM     8/2019 - patient admitted to the hospital for cystitis, diffuse rash   Felt to be drug rash due to new chemotherapy agent   Cystitis with E coli pansensitive and started on antibiotics     10/2019 - urosepsis with acute kidney injury admission   AT in the setting of prerenal/sepsis     11/2019-presented with dysuria   Concern for pyelonephritis   Patient also had urinary retention but was not able to self-catheterize   Moreno catheter was required at this time     Moreno discontinued on 11/22/19 and patient was supposed to self catheterize     12/2019- presented with urinary tract complaints   Treated for urosepsis with VRE   Required transfusion      12/2019 - represented on 12/9 - patient colitis?    CMV negative   C diff negative patient had bacteremia with porphyromonas which was monitored off antibiotics, asymptomatic bacteruria monitored      01/2020-admitted with anemia 6 5   Had warm autoantibodies but tolerated packed red blood cell   Acute kidney injury with creatinine up to 4  Possible prerenal versus progression of kidney dysfunction   Treated for urosepsis also     4/2020 - saw Vascular Surgery for fistula placement evaluation  Pt wanted to inquire about PD rather than HD  I spoke with pt  PD is not a good option for patient for social reasons  Pt in agreement       4/21/2020 - received pRBC due to anemia     5/2020 - initiated on hemodialysis     7/2020 - AVF placement      9/2020 - weakness admission  Nausea, vomiting  EGD with ulcers in esophagus, duodenum, dusky appearance of antrum   Treated for UTI       1) Renal transplant - now with allograft failure and on dialysis since may 2020     - has renal artery stenosis of transplanted kidney > 60%-s/p renal artery study without stenosis at [facility] with IR     Transplant candidacy - not a candidate for now due to MM per my discussion with Hematology given that pt has not been amenable to adequate treatment with IV regimen  Needs less than 5% BM activity before could be considered  Pt had to reschedule in November and we are not seeing pt for f/u 2/2021      Plan:    - check CBC now  - hypothyroid - pt is not on levothyroxine per pt  It is on med list but pt is sure not taking  So advised pt to restart and have TFTs this week  Have messaged and will defer changes to PCP  - med list discrepancy - pt is not on doxazosin, hydrlazine - have messaged Primary Nephrologist and advised pt to bring in medication bottles to dialysis  - diarrhea - check CMV PCR  Further eval per GI team in progress  - pt will continue dialysis as scheduled  - cont pred 5 for now  - start taper of tacrolimus as outlined below in summary     2) Immunosuppression -     - start weaning tac  - maintain low dose pred for now  - weighing risks and benefits of weaning one vs other, have chosen to wean tac for now   - given malignancy issues, also issues with severe fatigue (though this is likely related to other causes as outlined) will not wean pred for now due to risk of hypoadrenal state if weaned too fast to confound current picture of pt       3) HTN - per dialysis team     -renal artery study with stenosis present - saw [facility] team and had IR procedure on 9/25/18 with CO2 study and 5 cc contrast utilized - the transplant artery is slightly tortuous but the artery was widely patent     4) Anemia - has ESRD, and MM  Being managed by Hematology and Dialysis team     - prior fec occ positive  - had EGD prior with gastritis  - has MM also  - Hb decreasing on 2/13 - have discussed with Nephrologist  We will repeat CBC this week   May need further transfusion per Hematology if continues to remain low     5) recurrent UTI/pyelo - asymptomatic currently     - monitor clinically  - pt's E coli has become resistant over time     6) Vaccine - should stay up to date on innactivated vaccinations     7) Lipids - as per PCP with regards to lipid check     8) BMD - DEXA       - hyperphosphatemia improving with phosphorus binders  -DEXA scan per Endo team     5) age appropriate ca screening - needs to remain up to date with mammogram an colonoscopy      - needs to f/u with Derm Yearly     10) depression - worsening since death of father  11) MGUS/IgG Kappa now with myeloma - follows with Hematology/Oncology      -patient may require chemotherapy in the near future  - started on treatment with ixazomib but had a reaction  -patient had reaction to 2nd agent to also with bortezomib  - started on then on lenalidomide which has been on and off due to clinical state  - per Hematology     12) Fatigue - likely anemia, MM, and uremia related, possible depression and not taking levothyrox for one month     13) pancreas transplant -      - failed pancreas allograft - following with Endocrine     14) zoster     -zoster with mult episodes  - now resolved     15) aortic regurg     16) subclavian art stenosis on L     17) esophageal ulcers -     - GI on board  - had EGD in hospital last year with ulcers  - to have f/u EGD/colonoscopy     18) diarrhea    - O&P negative  - giardia - negative  - C diff negative  - check CMV  - per GI team    It was a pleasure evaluating your patient in the office today  Thank you for allowing our team to participate in the care of Ms Jeanine Domingo  Please do not hesitate to contact our team if further issues/questions shall arise in the interim  No problem-specific Assessment & Plan notes found for this encounter  HPI:    Patient recently lost father in January  Has been sig fatigued  No SOB  No edema       PATIENT INSTRUCTIONS:    Patient Instructions   1) Avoid NSAIDS - (Example - motrin, advil, ibuprofen, aleve, exederin, etc)  2) Always follow a low salt diet  3) please lower tacrolimus to    - 1 mg in AM and 0 5 mg in PM until march 1st  - then, on march 2nd lower to 0 5 mg in AM and 0 5 mg in PM until March 15th  - then on march 16th lower 0 5 mg once daily until march 29th  - then on march 30th you will STOP the tacrolimus completely  - IF AT ANY POINT DURING THIS WEAN YOU ARE NOT FEELING WELL, PAIN OVER YOUR KIDNEY TRANSPLANT SITE, please call right away    4) see if you have your levothyroxine bottle at home as you will need to restart this medication    5) take all of your medications to the dialysis unit so they can review it  6) labwork this week        OBJECTIVE:  Current Weight: Weight - Scale: 89 7 kg (197 lb 12 8 oz)  Vitals:    02/15/21 1308   BP: 170/60   BP Location: Left arm   Patient Position: Sitting   Cuff Size: Standard   Weight: 89 7 kg (197 lb 12 8 oz)   Height: 5' 8" (1 727 m)    Body mass index is 30 08 kg/m²  REVIEW OF SYSTEMS:    Review of Systems   Constitutional: Positive for fatigue  HENT: Negative  Eyes: Negative  Respiratory: Negative  Negative for shortness of breath  Cardiovascular: Negative  Negative for leg swelling  Gastrointestinal: Negative  Endocrine: Negative  Genitourinary: Negative  Negative for difficulty urinating  Musculoskeletal: Negative  Skin: Negative  Allergic/Immunologic: Negative  Neurological: Negative  Hematological: Negative  Psychiatric/Behavioral: Negative  All other systems reviewed and are negative  PHYSICAL EXAM:      Physical Exam  Vitals signs and nursing note reviewed  Constitutional:       General: She is not in acute distress  Appearance: She is well-developed  She is not diaphoretic  HENT:      Head: Normocephalic and atraumatic  Eyes:      General: No scleral icterus  Right eye: No discharge  Left eye: No discharge        Conjunctiva/sclera: Conjunctivae normal    Neck:      Musculoskeletal: Normal range of motion and neck supple  Vascular: No JVD  Cardiovascular:      Rate and Rhythm: Normal rate and regular rhythm  Heart sounds: No murmur  No friction rub  No gallop  Pulmonary:      Effort: Pulmonary effort is normal  No respiratory distress  Breath sounds: Normal breath sounds  No wheezing or rales  Abdominal:      General: Bowel sounds are normal  There is no distension  Palpations: Abdomen is soft  Tenderness: There is no abdominal tenderness  There is no rebound  Musculoskeletal: Normal range of motion  General: No tenderness or deformity  Skin:     General: Skin is warm and dry  Coloration: Skin is not pale  Findings: No erythema or rash  Neurological:      Mental Status: She is alert and oriented to person, place, and time  Coordination: Coordination normal    Psychiatric:         Behavior: Behavior normal          Thought Content:  Thought content normal          Judgment: Judgment normal          Medications:    Current Outpatient Medications:     ARIPiprazole (ABILIFY) 30 mg tablet, Take 1 tablet (30 mg total) by mouth daily at bedtime, Disp: 90 tablet, Rfl: 0    aspirin 81 MG tablet, Take 1 tablet by mouth daily, Disp: , Rfl:     busPIRone (BUSPAR) 10 mg tablet, Take 1 tablet (10 mg total) by mouth 2 (two) times a day, Disp: 180 tablet, Rfl: 2    calcium acetate (PHOSLO) capsule, Take 667 mg by mouth 3 (three) times a day with meals, Disp: , Rfl:     Cholecalciferol (VITAMIN D3) 1000 units CAPS, Take 1 capsule by mouth daily , Disp: , Rfl:     CRANBERRY PO, Take by mouth, Disp: , Rfl:     diphenoxylate-atropine (LOMOTIL) 2 5-0 025 mg per tablet, Take 1 tablet by mouth 4 (four) times a day as needed for diarrhea, Disp: 120 tablet, Rfl: 5    DULoxetine (CYMBALTA) 60 mg delayed release capsule, Take 1 capsule (60 mg total) by mouth 2 (two) times a day, Disp: 180 capsule, Rfl: 0    folic acid (FOLVITE) 1 mg tablet, TAKE 1 TABLET BY MOUTH EVERY DAY AS DIRECTED, Disp: 90 tablet, Rfl: 3    glucose blood test strip, Test 4 times daily, Disp: 400 each, Rfl: 1    insulin glargine (Toujeo Max SoloStar) 300 units/mL CONCETRATED U-300 injection pen (2-unit dial), Inject 6 Units under the skin daily at bedtime, Disp: , Rfl:     insulin lispro (HumaLOG KwikPen) 100 units/mL injection pen, Use up to 20 units daily via sliding scale, Disp: 15 pen, Rfl: 1    lenalidomide (REVLIMID) 2 5 MG CAPS, Take 2 5 mg by mouth every day  [facility]#[id number], Disp: 28 capsule, Rfl: 0    levothyroxine 125 mcg tablet, TAKE 1 TABLET BY MOUTH EVERY DAY, Disp: 90 tablet, Rfl: 1    loperamide (IMODIUM A-D) 2 MG tablet, Take 1 tablet (2 mg total) by mouth 4 (four) times a day as needed for diarrhea, Disp: 30 tablet, Rfl: 3    LORazepam (ATIVAN) 2 mg tablet, Take 1 tablet (2 mg total) by mouth 3 (three) times a day as needed for anxiety, Disp: 90 tablet, Rfl: 1    metoprolol tartrate (LOPRESSOR) 50 mg tablet, TAKE 1 TABLET TWICE DAILY, Disp: 180 tablet, Rfl: 0    ondansetron (ZOFRAN) 4 mg tablet, Take 1 tablet (4 mg total) by mouth every 8 (eight) hours as needed for nausea or vomiting, Disp: 30 tablet, Rfl: 3    pantoprazole (PROTONIX) 40 mg tablet, Take 1 tablet (40 mg total) by mouth 2 (two) times a day before meals, Disp: 60 tablet, Rfl: 1    polyethylene glycol (GOLYTELY) 4000 mL solution, Please follow prep instructions provided by the office, Disp: 4000 mL, Rfl: 0    pravastatin (PRAVACHOL) 80 mg tablet, TAKE 1 TABLET BY MOUTH EVERY DAY, Disp: 90 tablet, Rfl: 5    predniSONE 5 mg tablet, One tablet daily, Disp: 90 tablet, Rfl: 3    rOPINIRole (REQUIP) 0 25 mg tablet, Take 1 tablet (0 25 mg total) by mouth 2 (two) times a day, Disp: 180 tablet, Rfl: 1    sertraline (ZOLOFT) 100 mg tablet, Take 2 tablets (200 mg total) by mouth daily, Disp: 180 tablet, Rfl: 2    torsemide (DEMADEX) 20 mg tablet, TAKE 1 TABLET DAILY, Disp: 90 tablet, Rfl: 3    tacrolimus (PROGRAF) 0 5 mg capsule, Take 2 capsules (1 mg total) by mouth 2 (two) times a day, Disp: 90 capsule, Rfl: 3    Laboratory Results:  Results from last 7 days   Lab Units 02/13/21  1200   HEMOGLOBIN g/dL 6 8*       Results for orders placed or performed in visit on 02/13/21   Hemoglobin   Result Value Ref Range    Hemoglobin 6 8 (LL) 11 5 - 15 4 g/dL     *Note: Due to a large number of results and/or encounters for the requested time period, some results have not been displayed  A complete set of results can be found in Results Review

## 2021-02-15 NOTE — PROGRESS NOTES
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, dCHF, aortic regurg, dCHF grade I, zoster in July, subclavian art stenosis on Left presents for follow-up visit for renal transplant     Patient has had multiple admissions and issues recently     9/2017 -acute kidney injury, urinary frequency   Treated initially for UTI      10/2017 -urinary incontinence   Was started on antibiotics for possible UTI   Was started on Bactrim prophylaxis      11/2017 -abdominal pain  Nausea   Confusion   Depression and anxiety   Patient had EGD which showed gastritis   Given volume expansion        2/2018-depression and anxiety   Treated for UTI also        07/2018-treated for urinary tract infection; also was seen in the ER on 07/23 for periorbital cellulitis     9/2018 - renal artery study at [facility] - patent renal artery and anastamosis that did not require intervention     March/april 2019 - saw Hematology  Rising kappa light chain  BM biopsy was recommended       4/16/19 - had foot abscess and treated with antibiotics       4/17/19 - BM biopsy completed  Progressing myeloma cells in BM     8/2019 - patient admitted to the hospital for cystitis, diffuse rash   Felt to be drug rash due to new chemotherapy agent   Cystitis with E coli pansensitive and started on antibiotics     10/2019 - urosepsis with acute kidney injury admission    ATN in the setting of prerenal/sepsis     11/2019-presented with dysuria   Concern for pyelonephritis   Patient also had urinary retention but was not able to self-catheterize   Moreno catheter was required at this time     Moreno discontinued on 11/22/19 and patient was supposed to self catheterize     12/2019- presented with urinary tract complaints   Treated for urosepsis with VRE   Required transfusion      12/2019 - represented on 12/9 - patient colitis?    CMV negative   C diff negative patient had bacteremia with porphyromonas which was monitored off antibiotics, asymptomatic bacteruria monitored      01/2020-admitted with anemia 6 5   Had warm autoantibodies but tolerated packed red blood cell   Acute kidney injury with creatinine up to 4  Possible prerenal versus progression of kidney dysfunction   Treated for urosepsis also     4/2020 - saw Vascular Surgery for fistula placement evaluation  Pt wanted to inquire about PD rather than HD  I spoke with pt  PD is not a good option for patient for social reasons  Pt in agreement       4/21/2020 - received pRBC due to anemia     5/2020 - initiated on hemodialysis     7/2020 - AVF placement      9/2020 - weakness admission  Nausea, vomiting  EGD with ulcers in esophagus, duodenum, dusky appearance of antrum  Treated for UTI       1) Renal transplant - now with allograft failure and on dialysis since may 2020     - has renal artery stenosis of transplanted kidney > 60%-s/p renal artery study without stenosis at [facility] with IR     Transplant candidacy - not a candidate for now due to MM per my discussion with Hematology given that pt has not been amenable to adequate treatment with IV regimen  Needs less than 5% BM activity before could be considered  Pt had to reschedule in November and we are not seeing pt for f/u 2/2021      Plan:    - check CBC now  - hypothyroid - pt is not on levothyroxine per pt  It is on med list but pt is sure not taking  So advised pt to restart and have TFTs this week   Have messaged and will defer changes to PCP  - med list discrepancy - pt is not on doxazosin, hydrlazine - have messaged Primary Nephrologist and advised pt to bring in medication bottles to dialysis  - diarrhea - check CMV PCR  Further eval per GI team in progress  Consider exocrine panc issue given failed panc transplant (though possible less likely as likely bladder drainage pancreas given timing of initial transplant?)  - pt will continue dialysis as scheduled  - cont pred 5 for now  - start taper of tacrolimus as outlined below in summary     2) Immunosuppression -     - start weaning tac  - maintain low dose pred for now  - weighing risks and benefits of weaning one vs other, have chosen to wean tac for now   - given malignancy issues, also issues with severe fatigue (though this is likely related to other causes as outlined) will not wean pred for now due to risk of hypoadrenal state if weaned too fast to confound current picture of pt       3) HTN - per dialysis team     -renal artery study with stenosis present - saw [facility] team and had IR procedure on 9/25/18 with CO2 study and 5 cc contrast utilized - the transplant artery is slightly tortuous but the artery was widely patent     4) Anemia - has ESRD, and MM  Being managed by Hematology and Dialysis team     - prior fec occ positive  - had EGD prior with gastritis  - has MM also  - Hb decreasing on 2/13 - have discussed with Nephrologist  We will repeat CBC this week  May need further transfusion per Hematology if continues to remain low     5) recurrent UTI/pyelo - asymptomatic currently     - monitor clinically  - pt's E coli has become resistant over time     6) Vaccine - should stay up to date on innactivated vaccinations     7) Lipids - as per PCP with regards to lipid check     8) BMD - DEXA       - hyperphosphatemia improving with phosphorus binders    -DEXA scan per Endo team     5) age appropriate ca screening - needs to remain up to date with mammogram an colonoscopy      - needs to f/u with Derm Yearly     10) depression - worsening since death of father  11) MGUS/IgG Kappa now with myeloma - follows with Hematology/Oncology      -patient may require chemotherapy in the near future  - started on treatment with ixazomib but had a reaction  -patient had reaction to 2nd agent to also with bortezomib  - started on then on lenalidomide which has been on and off due to clinical state  - per Hematology     12) Fatigue - likely anemia, MM, and uremia related, possible depression and not taking levothyrox for one month     13) pancreas transplant -      - failed pancreas allograft - following with Endocrine     14) zoster     -zoster with mult episodes  - now resolved     15) aortic regurg     16) subclavian art stenosis on L     17) esophageal ulcers -     - GI on board  - had EGD in hospital last year with ulcers  - to have f/u EGD/colonoscopy     18) diarrhea    - O&P negative  - giardia - negative  - C diff negative  - check CMV  - per GI team    It was a pleasure evaluating your patient in the office today  Thank you for allowing our team to participate in the care of Ms Sai Dorsey  Please do not hesitate to contact our team if further issues/questions shall arise in the interim  No problem-specific Assessment & Plan notes found for this encounter  HPI:    Patient recently lost father in January  Has been sig fatigued  No SOB  No edema       PATIENT INSTRUCTIONS:    Patient Instructions   1) Avoid NSAIDS - (Example - motrin, advil, ibuprofen, aleve, exederin, etc)  2) Always follow a low salt diet  3) please lower tacrolimus to    - 1 mg in AM and 0 5 mg in PM until march 1st  - then, on march 2nd lower to 0 5 mg in AM and 0 5 mg in PM until March 15th  - then on march 16th lower 0 5 mg once daily until march 29th  - then on march 30th you will STOP the tacrolimus completely  - IF AT ANY POINT DURING THIS WEAN YOU ARE NOT FEELING WELL, PAIN OVER YOUR KIDNEY TRANSPLANT SITE, please call right away    4) see if you have your levothyroxine bottle at home as you will need to restart this medication    5) take all of your medications to the dialysis unit so they can review it  6) labwork this week        OBJECTIVE:  Current Weight: Weight - Scale: 89 7 kg (197 lb 12 8 oz)  Vitals:    02/15/21 1308   BP: 170/60   BP Location: Left arm   Patient Position: Sitting   Cuff Size: Standard   Weight: 89 7 kg (197 lb 12 8 oz)   Height: 5' 8" (1 727 m)    Body mass index is 30 08 kg/m²  REVIEW OF SYSTEMS:    Review of Systems   Constitutional: Positive for fatigue  HENT: Negative  Eyes: Negative  Respiratory: Negative  Negative for shortness of breath  Cardiovascular: Negative  Negative for leg swelling  Gastrointestinal: Negative  Endocrine: Negative  Genitourinary: Negative  Negative for difficulty urinating  Musculoskeletal: Negative  Skin: Negative  Allergic/Immunologic: Negative  Neurological: Negative  Hematological: Negative  Psychiatric/Behavioral: Negative  All other systems reviewed and are negative  PHYSICAL EXAM:      Physical Exam  Vitals signs and nursing note reviewed  Constitutional:       General: She is not in acute distress  Appearance: She is well-developed  She is not diaphoretic  HENT:      Head: Normocephalic and atraumatic  Eyes:      General: No scleral icterus  Right eye: No discharge  Left eye: No discharge  Conjunctiva/sclera: Conjunctivae normal    Neck:      Musculoskeletal: Normal range of motion and neck supple  Vascular: No JVD  Cardiovascular:      Rate and Rhythm: Normal rate and regular rhythm  Heart sounds: No murmur  No friction rub  No gallop  Pulmonary:      Effort: Pulmonary effort is normal  No respiratory distress  Breath sounds: Normal breath sounds  No wheezing or rales  Abdominal:      General: Bowel sounds are normal  There is no distension  Palpations: Abdomen is soft  Tenderness: There is no abdominal tenderness  There is no rebound  Musculoskeletal: Normal range of motion  General: No tenderness or deformity  Skin:     General: Skin is warm and dry  Coloration: Skin is not pale  Findings: No erythema or rash  Neurological:      Mental Status: She is alert and oriented to person, place, and time  Coordination: Coordination normal    Psychiatric:         Behavior: Behavior normal          Thought Content:  Thought content normal          Judgment: Judgment normal          Medications:    Current Outpatient Medications:     ARIPiprazole (ABILIFY) 30 mg tablet, Take 1 tablet (30 mg total) by mouth daily at bedtime, Disp: 90 tablet, Rfl: 0    aspirin 81 MG tablet, Take 1 tablet by mouth daily, Disp: , Rfl:     busPIRone (BUSPAR) 10 mg tablet, Take 1 tablet (10 mg total) by mouth 2 (two) times a day, Disp: 180 tablet, Rfl: 2    calcium acetate (PHOSLO) capsule, Take 667 mg by mouth 3 (three) times a day with meals, Disp: , Rfl:     Cholecalciferol (VITAMIN D3) 1000 units CAPS, Take 1 capsule by mouth daily , Disp: , Rfl:     CRANBERRY PO, Take by mouth, Disp: , Rfl:     diphenoxylate-atropine (LOMOTIL) 2 5-0 025 mg per tablet, Take 1 tablet by mouth 4 (four) times a day as needed for diarrhea, Disp: 120 tablet, Rfl: 5    DULoxetine (CYMBALTA) 60 mg delayed release capsule, Take 1 capsule (60 mg total) by mouth 2 (two) times a day, Disp: 180 capsule, Rfl: 0    folic acid (FOLVITE) 1 mg tablet, TAKE 1 TABLET BY MOUTH EVERY DAY AS DIRECTED, Disp: 90 tablet, Rfl: 3    glucose blood test strip, Test 4 times daily, Disp: 400 each, Rfl: 1    insulin glargine (Toujeo Max SoloStar) 300 units/mL CONCETRATED U-300 injection pen (2-unit dial), Inject 6 Units under the skin daily at bedtime, Disp: , Rfl:     insulin lispro (HumaLOG KwikPen) 100 units/mL injection pen, Use up to 20 units daily via sliding scale, Disp: 15 pen, Rfl: 1    lenalidomide (REVLIMID) 2 5 MG CAPS, Take 2 5 mg by mouth every day  [facility]#[id number], Disp: 28 capsule, Rfl: 0    levothyroxine 125 mcg tablet, TAKE 1 TABLET BY MOUTH EVERY DAY, Disp: 90 tablet, Rfl: 1    loperamide (IMODIUM A-D) 2 MG tablet, Take 1 tablet (2 mg total) by mouth 4 (four) times a day as needed for diarrhea, Disp: 30 tablet, Rfl: 3    LORazepam (ATIVAN) 2 mg tablet, Take 1 tablet (2 mg total) by mouth 3 (three) times a day as needed for anxiety, Disp: 90 tablet, Rfl: 1    metoprolol tartrate (LOPRESSOR) 50 mg tablet, TAKE 1 TABLET TWICE DAILY, Disp: 180 tablet, Rfl: 0    ondansetron (ZOFRAN) 4 mg tablet, Take 1 tablet (4 mg total) by mouth every 8 (eight) hours as needed for nausea or vomiting, Disp: 30 tablet, Rfl: 3    pantoprazole (PROTONIX) 40 mg tablet, Take 1 tablet (40 mg total) by mouth 2 (two) times a day before meals, Disp: 60 tablet, Rfl: 1    polyethylene glycol (GOLYTELY) 4000 mL solution, Please follow prep instructions provided by the office, Disp: 4000 mL, Rfl: 0    pravastatin (PRAVACHOL) 80 mg tablet, TAKE 1 TABLET BY MOUTH EVERY DAY, Disp: 90 tablet, Rfl: 5    predniSONE 5 mg tablet, One tablet daily, Disp: 90 tablet, Rfl: 3    rOPINIRole (REQUIP) 0 25 mg tablet, Take 1 tablet (0 25 mg total) by mouth 2 (two) times a day, Disp: 180 tablet, Rfl: 1    sertraline (ZOLOFT) 100 mg tablet, Take 2 tablets (200 mg total) by mouth daily, Disp: 180 tablet, Rfl: 2    torsemide (DEMADEX) 20 mg tablet, TAKE 1 TABLET DAILY, Disp: 90 tablet, Rfl: 3    tacrolimus (PROGRAF) 0 5 mg capsule, Take 2 capsules (1 mg total) by mouth 2 (two) times a day, Disp: 90 capsule, Rfl: 3    Laboratory Results:  Results from last 7 days   Lab Units 02/13/21  1200   HEMOGLOBIN g/dL 6 8*       Results for orders placed or performed in visit on 02/13/21   Hemoglobin   Result Value Ref Range    Hemoglobin 6 8 (LL) 11 5 - 15 4 g/dL     *Note: Due to a large number of results and/or encounters for the requested time period, some results have not been displayed  A complete set of results can be found in Results Review

## 2021-02-15 NOTE — PATIENT INSTRUCTIONS
1) Avoid NSAIDS - (Example - motrin, advil, ibuprofen, aleve, exederin, etc)  2) Always follow a low salt diet  3) please lower tacrolimus to    - 1 mg in AM and 0 5 mg in PM until march 1st  - then, on march 2nd lower to 0 5 mg in AM and 0 5 mg in PM until March 15th  - then on march 16th lower 0 5 mg once daily until march 29th  - then on march 30th you will STOP the tacrolimus completely  - IF AT ANY POINT DURING THIS WEAN YOU ARE NOT FEELING WELL, PAIN OVER YOUR KIDNEY TRANSPLANT SITE, please call right away    4) see if you have your levothyroxine bottle at home as you will need to restart this medication    5) take all of your medications to the dialysis unit so they can review it       6) labwork this week

## 2021-02-16 NOTE — TELEPHONE ENCOUNTER
BG levels mostly in acceptable range     Continue current treatment   Follow diabetic diet  Keep carbohydrate intake consistent with meals to limit fluctuations in BG levels

## 2021-02-17 NOTE — PROGRESS NOTES
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     Bone marrow biopsy on 04/2019 showed 35% plasma cells, with 13q deletion, trisomy 6, trisomy 17, and duplication of multiple chromosomes     Initiated on lenalidomide 5 mg p o  Every other day with good response initially with decrease in M protein and kappa light chain however the therapy was interrupted many times because of multiple admissions to the hospital fluid overload, urinary tract infection, symptomatic anemia, colitis        Lenalidomide 2 5 mg every other day initiated 03/06/2020 with partial response  Lenalidomide  Increased to 2 5 mg every day 8/2020 with good response however complicated with grade 2-3 diarrhea     We will discontinue lenalidomide     Daratumumab subcu 1800 mg flat dose every week for 8 weeks and then every other week for 4 months and then monthly    I do not have enough data about daratumumab with hemodialysis patient but case reports showed it is safe to give after finishing hemodialysis    The patient in hemodialysis on Tuesday, Thursdays, Saturdays hoping to start daratumumab every Wednesday    Side effects such as allergic reaction, anaphylactic reaction, pancytopenia, infection exposure, nausea, vomiting, diarrhea told, she agree to proceed, she signed the consent    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 myeloma with 17 P gain or trisomy 17, chromosome 1 abnormalities and 11 Q deletion, gain 5 of 11q or trisomy 11        Labs and imaging studies are reviewed by ordering provider once results are available  If there are findings that need immediate attention, you will be contacted when results available     Discussing results and the implication on your healthcare is best discussed in person at your follow-up visit  HPI:    Jefferson Valentin is a 58-year-old  female with history of type 1 diabetes mellitus, diabetic neuropathy, diabetic nephropathy, status post pancreas and kidney transplant in 1998 at [address] [place] [address], amputation of the right big toe, cholecystectomy, vitamin-D deficiency, exacerbation of diabetes mellitus while she is on insulin, possible pancreas burn out, herpes zoster x2, who was found to have abnormal serum protein electrophoresis in August 2017 with IgG kappa a peak of 0 4 grams/deciliter and peak 2  of 2 27 grams/deciliter, mildly elevated kappa light chain, chronic kidney disease     Status post bone marrow biopsy 9/20/2017 showed 15-20% plasma cells, normal cytogenetics and normal FISH panel for myeloma   Bone skeletal survey showed no evidence of lytic lesions   She was diagnosed with smoldering multiple myeloma   She was meet with Dr Ulises Ocampo from [facility] to continue to observe     She has diabetic neuropathy grade 3, uses a cane for ambulation, herpes zoster in February 2019      On 04/10/2019 kappa light chain 69, lambda light chain 17 2 with a ratio of 4 0 which increased from ratio of 2 8 in January 2019  Serum protein electrophoresis showed M protein of 0 5 and 2 67 IgG kappa, IgG 2 9 g, IgA 62, IgM 31, creatinine 2 0, total protein 9, albumin 3, calcium 8 6     Repeat bone marrow biopsy on 04/17/2019 showed progressive multiple myeloma with plasma cells in the range of 35%, now with multiple chromosomal abnormalities including 13 Q deletion, 11 Q, trisomy 11, gain of 17 PO trisomy 17, hyperdiploid with cane of additional copies of chromosome 5, 6, 7, 9, 11, 15, 17, 18, large deletion involving most of the chromosome 13 (monosomy 13) 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     Ixazomib with severe pruritus requiring discontinuation in June 2019      Lenalidomide 5 mg p o   Daily 3 weeks on 1 week off approved initiated on 08/07/2019 and discontinued on 08/13/2019 because of pruritus  At her 8/20/2019 f/u treatment options discussed   She elected to Re trial lenalidomide  5 milligrams every other day 3 weeks on 1 week off planned   She did not want IV medication        Bortezomib was not started because of grade 3 neuropathy      Admitted 9/9- 9/13/2019 secondary to UTI, EDUARDO/CKD stage IV, Acute metabolic encephalopathy   She was sent to the hospital due to elevated creatinine and dysuria   Revlimid held  Admitted 10/1/2019 2nd to urinary symptoms, dysuria increased frequency, chills and confusion      10/8/2019   Revlimid at 5 mg po every other day, 3 weeks on 1 week off retrialed   She did not want IV treatment      Admitted 11/5- 11/8/2019   Re-admitted 2nd to dysuria and EDUARDO   Moreno catheter placed   Catheter was discontinued 11/22/19 and patient was trained on self catheterization     Admitted 11/23/2019 - 12/06/19 for recurrent UTI   Findings of colitis seen on CT A/P       Admitted  12/9/2019 - 1/2/2020 2nd to fever and abdominal pain, diarrhea, lethargy, confusion    Inpatient rehab recommended   Patient declined   VRE bacteriuria treated with antibiotics  Duglas Lambert was thought to be a chronic colonizer  , likely patient has chronic colonizer    She was treated for congestive heart failure     Admitted in May 2020 with diarrhea, abdominal pain, UTI     Initiated on hemodialysis in May 2020, lenalidomide 2 5 mg after hemodialysis     Improvement in clinical stamina after hemodialysis, nephrology are providing Epogen and Venofer    She had been on lenalidomide 2 5 mg p o  daily with diarrhea despite dose reduction for 5 mg, she had good response    On 01/22/2021 kappa light chain 430, lambda light chain 213 with a ratio of 2 0, IgA 223, IgG 1740, IgM 71     Interval History:        Previous Treatment:         Test Results:    Imaging: No results found      Labs:   Lab Results   Component Value Date    WBC 5 76 01/22/2021    HGB 6 8 (LL) 02/13/2021    HCT 27 5 (L) 01/22/2021     (H) 01/22/2021     01/22/2021     Lab Results   Component Value Date     (L) 01/06/2016    K 4 8 01/22/2021    CL 98 (L) 01/22/2021    CO2 33 (H) 01/22/2021    ANIONGAP 6 01/06/2016    BUN 42 (H) 01/22/2021    CREATININE 4 57 (H) 01/22/2021    GLUCOSE 103 09/13/2017    GLUF 71 11/11/2020    CALCIUM 8 2 (L) 01/22/2021    CORRECTEDCA 9 0 01/22/2021    AST 18 01/22/2021    ALT 23 01/22/2021    ALKPHOS 154 (H) 01/22/2021    PROT 8 3 (H) 12/14/2015    BILITOT 0 27 12/14/2015    EGFR 10 01/22/2021       Lab Results   Component Value Date    IRON 65 01/17/2020    TIBC 215 (L) 01/17/2020    FERRITIN 143 01/17/2020       Lab Results   Component Value Date    IKCKGTLI85 438 07/14/2014         ROS: Review of Systems   Constitutional: Positive for fatigue  Negative for appetite change, chills, diaphoresis and unexpected weight change  HENT:   Negative for mouth sores, nosebleeds, sore throat, trouble swallowing and voice change  Eyes: Negative for eye problems and icterus  Respiratory: Positive for shortness of breath  Negative for chest tightness, cough, hemoptysis and wheezing  Cardiovascular: Negative for chest pain, leg swelling and palpitations  Gastrointestinal: Positive for diarrhea  Negative for abdominal distention, abdominal pain, blood in stool, constipation, nausea and vomiting  Endocrine: Negative for hot flashes  Genitourinary: Negative for bladder incontinence, difficulty urinating, dyspareunia, dysuria and frequency  Musculoskeletal: Negative for arthralgias, back pain, gait problem, neck pain and neck stiffness  Skin: Negative for itching and rash  Neurological: Negative for dizziness, gait problem, headaches, numbness, seizures and speech difficulty  Hematological: Negative for adenopathy  Does not bruise/bleed easily     Psychiatric/Behavioral: Negative for decreased concentration, depression, sleep disturbance and suicidal ideas  The patient is not nervous/anxious  Current Medications: Reviewed  Allergies: Reviewed  PMH/FH/SH:  Reviewed      Physical Exam:    Body surface area is 2 02 meters squared  Wt Readings from Last 3 Encounters:   02/17/21 88 6 kg (195 lb 6 4 oz)   02/15/21 89 7 kg (197 lb 12 8 oz)   01/06/21 88 4 kg (194 lb 12 8 oz)        Temp Readings from Last 3 Encounters:   02/17/21 99 2 °F (37 3 °C) (Tympanic)   01/06/21 97 5 °F (36 4 °C) (Tympanic)   10/19/20 (!) 97 4 °F (36 3 °C) (Tympanic)        BP Readings from Last 3 Encounters:   02/17/21 136/62   02/15/21 170/60   01/06/21 158/71         Pulse Readings from Last 3 Encounters:   02/17/21 71   01/06/21 68   10/19/20 66        Physical Exam  Vitals signs reviewed  Constitutional:       General: She is not in acute distress  Appearance: She is well-developed  She is not diaphoretic  HENT:      Head: Normocephalic and atraumatic  Eyes:      Conjunctiva/sclera: Conjunctivae normal    Neck:      Musculoskeletal: Normal range of motion and neck supple  Trachea: No tracheal deviation  Cardiovascular:      Rate and Rhythm: Normal rate and regular rhythm  Heart sounds: No murmur  No friction rub  No gallop  Pulmonary:      Effort: Pulmonary effort is normal  No respiratory distress  Breath sounds: Normal breath sounds  No wheezing or rales  Chest:      Chest wall: No tenderness  Abdominal:      General: There is no distension  Palpations: Abdomen is soft  Tenderness: There is no abdominal tenderness  Musculoskeletal:      Right lower leg: Edema present  Left lower leg: Edema present  Lymphadenopathy:      Cervical: No cervical adenopathy  Skin:     General: Skin is warm and dry  Coloration: Skin is not pale  Findings: No erythema  Neurological:      Mental Status: She is alert and oriented to person, place, and time     Psychiatric:         Behavior: Behavior normal          Thought Content: Thought content normal          Judgment: Judgment normal          ECO    Goals and Barriers:  Current Goal: Minimize effects of disease  Barriers: None  Patient's Capacity to Self Care:  Patient is able to self care      Code Status: @[facility]@
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
## 2021-02-21 NOTE — TELEPHONE ENCOUNTER
Pt states she will be starting a new method of taking her Chemo treatment and she has some concerns she would like to discuss  Please call pt back

## 2021-02-24 NOTE — TELEPHONE ENCOUNTER
[facility] called and left message on clerical line  They wanted to confirm the toujeo script was sent over today  Please call and speak to pharmacy  Phone number is [phone]

## 2021-02-24 NOTE — RESULT ENCOUNTER NOTE
Vivek Alvarado - pt's thyroid studies have returned  Will defer to you for management goals  She was the pt who stopped her levothyrox since [place] for unclear reasons   She was advised to restart it    Dr Isaiah Marrufo, Hb improving    Thank you    jessica

## 2021-02-24 NOTE — PROGRESS NOTES
MSW received a referral from [facility] where pt self scored a 10 to indicate her level of stress  The pt marked off transportation, depression, fears, nervousness  Sadness, worry and loss of interest as her psychosocial stressors  She indicated 7 out of 21 physical stressors  MSW reached out to the pt to offer support and she was open and receptive of this call  The pt immediately explained her high score was due to the recent, unexpected passing of her father  The pt is , has no children and her support system is her parents  She spoke of the grief that she has been feeling since his passing  Additionally, the pt has a sister who recently moved to [facility] and the plan was for the pt and her parents to move together to [facility]  The home is already purchased and the pt states that many of her belongings are already in the home  During this call the pt shared her multiple abran issues and how she has had them for the majority of her life  She currently receives dialysis several days per week and is overwhelmed with adding the chemo to her schedule  The pt recognizes that she has anxiety and she reports that she has a counselor that she sees on a regular basis and finds this helpful  At this time, the pt lives in her own apartment but did express concerns as this is becoming more difficult for her  She is hopeful to move in with her mother prior to their move to [facility]  One of the issues that the pt is struggling with is pain and MSW spoke about Palliative care and provided the phone number  MSW will reach out to [facility] to request the order  MSW offered emotional support and will continue to follow

## 2021-02-25 NOTE — TELEPHONE ENCOUNTER
I reached out to patient, she stated she was busy and to call her later this afternoon to make an appointment

## 2021-02-26 PROBLEM — F41.1 GAD (GENERALIZED ANXIETY DISORDER): Chronic | Status: ACTIVE | Noted: 2021-01-01

## 2021-02-26 PROBLEM — F33.2 MAJOR DEPRESSIVE DISORDER, RECURRENT, SEVERE WITHOUT PSYCHOTIC FEATURES (HCC): Chronic | Status: ACTIVE | Noted: 2017-03-02

## 2021-02-26 NOTE — BH TREATMENT PLAN
TREATMENT PLAN (Medication Management Only)        [facility]    Name/Date of Birth/MRN:  Cheli Prakash 63 y o  1965 MRN: [id number]  Date of Treatment Plan: February 26, 2021  Diagnosis/Diagnoses:   1  Major depressive disorder, recurrent, severe without psychotic features ([facility] [place] [address] )    2  FELIPE (generalized anxiety disorder)    3  Post-traumatic stress disorder, chronic      Strengths/Personal Resources for Self-Care: "my family"  Area/Areas of need (in own words): "anxiety"  1  Long Term Goal:   improve control of anxiety, help with depression  Target Date: 2 months - 4/26/2021  Person/Persons responsible for completion of goal: Jeana Dobson  2  Short Term Objective (s) - How will we reach this goal?:   A  Provider new recommended medication/dosage changes and/or continue medication(s): increase Buspar, continue all other medications (Zoloft, Cymbalta, Abilify and Ativan)  B   N/A   C   N/A  Target Date: 2 months - 4/26/2021  Person/Persons Responsible for Completion of Goal: Jeana Dobson   Progress Towards Goals: regressed  Treatment Modality: medication management every 2 months  Review due 180 days from date of this plan: 6 months - 8/26/2021  Expected length of service: ongoing treatment unless revised  My Physician/PA/NP and I have developed this plan together and I agree to work on the goals and objectives  I understand the treatment goals that were developed for my treatment    Electronic Signatures: on file (unless signed below)    China Vargas MD 02/26/21

## 2021-02-26 NOTE — RESULT ENCOUNTER NOTE
I spoke to patient  She did say she stopped it sometime in January as she has so many medications she gets confused sometimes  She can't remember a reason for stopping  Should she restart?

## 2021-03-03 NOTE — ANESTHESIA POSTPROCEDURE EVALUATION
Post-Op Assessment Note    CV Status:  Stable    Pain management: adequate     Mental Status:  Alert and awake   Hydration Status:  Euvolemic   PONV Controlled:  Controlled   Airway Patency:  Patent      Post Op Vitals Reviewed: Yes      Staff: Anesthesiologist, with CRNAs         No complications documented      BP     Temp      Pulse     Resp      SpO2

## 2021-03-03 NOTE — PROCEDURES
ESOPHAGOGASTRODUODENOSCOPY    PROCEDURE: EGD    SEDATION: Monitored anesthesia care, check anesthesia records    INDICATIONS: Diarrhea and h/o duodenal ulcer  CONSENT:  Informed consent was obtained for the procedure, including sedation after explaining the risks and benefits of the procedure  Risks including but not limited to bleeding, perforation, infection, and missed lesion  PREPARATION:   Telemetry, pulse oximetry, blood pressure were monitored throughout the procedure  Patient was identified by myself both verbally and by visual inspection of ID band  DESCRIPTION:   Patient was placed in the left lateral decubitus position and was sedated with the above medication  The gastroscope was introduced in to the oropharynx and the esophagus was intubated under direct visualization  Scope was passed down the esophagus up to 2nd part of the duodenum  A careful inspection was made as the gastroscope was withdrawn, including a retroflexed view of the stomach; findings and interventions are described below  FINDINGS:    #1  Esophagus- Normal     GE junction : Irregular with possible Kaiser's esophagus  Biopsies done  M1 with two tongues  Hiatal hernia : Not seen  #2  Stomach- Normal  Biopsies done  #3  Duodenum- Normal  Biopsies done  IMPRESSIONS:      1  Esophagus : Short segment Kaiser's (possible)  Biopsies done  2    Stomach : Normal  Biopsies done  3    Duodenum : Normal  Biopsies done  RECOMMENDATIONS:     1  Follow up biopsy results with Dr Carla Mack in 2 weeks by phone at [phone]    2  If Kaiser's confirmed then repeat EGD in 3 years  3  Continue Protonix  Diet : GERD diet: avoid fried and fatty foods  peppermint, chocolate, alcohol, coffee, citrus fruits and juices, tomoato products; avoid lying down for 2 to 3 hours after eating  COMPLICATIONS:  None; patient tolerated the procedure well            DISPOSITION: PACU           CONDITION: Stable

## 2021-03-03 NOTE — ANESTHESIA PREPROCEDURE EVALUATION
Procedure:  COLONOSCOPY    Relevant Problems   CARDIO   (+) Aortic regurgitation   (+) Atrial fibrillation (HCC)   (+) Chronic diastolic congestive heart failure (HCC)   (+) Hyperlipidemia   (+) Hypertension      ENDO   (+) Acquired hypothyroidism   (+) Diabetes mellitus type I, controlled ([facility] [address] )   (+) Secondary hyperparathyroidism, renal (HCC)   (+) Type 1 diabetes mellitus with hyperglycemia (HCC)      GI/HEPATIC   (+) Duodenal ulcer   (+) Failure of pancreas transplant   (+) Ulcer of esophagus without bleeding      /RENAL   (+) Chronic kidney disease-mineral and bone disorder   (+) Diabetic nephropathy (HCC)   (+) ESRD (end stage renal disease) (HCC)   (+) Renal transplant recipient   (+) Status post kidney transplant      HEMATOLOGY   (+) Anemia      NEURO/PSYCH   (+) Diabetic polyneuropathy (HCC)   (+) FELIPE (generalized anxiety disorder)   (+) History of herpes zoster   (+) Major depressive disorder, recurrent, severe without psychotic features ([facility])   (+) Neuropathy in diabetes (HCC)   (+) Post-traumatic stress disorder, chronic        Physical Exam    Airway    Mallampati score: II         Dental       Cardiovascular  Rhythm: regular, Rate: normal,     Pulmonary  Breath sounds clear to auscultation,     Other Findings        Anesthesia Plan  ASA Score- 3     Anesthesia Type-     Plan Factors-Exercise tolerance (METS): >4 METS  Chart reviewed  Induction- intravenous  Postoperative Plan-     Informed Consent- Anesthetic plan and risks discussed with patient  I personally reviewed this patient with the CRNA  Discussed and agreed on the Anesthesia Plan with the CRNA  Ludin Oliveira

## 2021-03-05 PROBLEM — F41.9 ANXIETY: Status: ACTIVE | Noted: 2021-01-01

## 2021-03-05 PROBLEM — I46.9 CARDIAC ARREST (HCC): Status: ACTIVE | Noted: 2021-01-01

## 2021-03-05 NOTE — TELEPHONE ENCOUNTER
Patient scheduled for transfusion tomorrow 7:30 am  [facility] aware patient will be there around 11:00-11:30  Patient aware of plan

## 2021-03-05 NOTE — PROGRESS NOTES
Pt tolerated SQ Darzalex without issue  4 hour observation today  Pt will return at 0730 3/6/21 for 1 unit PRBC  Pt offers no complaints   Appointments verified

## 2021-03-05 NOTE — PROGRESS NOTES
Pt presents for day 1 cycle 1 Faspro  Pt is extremely anxious, reports having ativan at home however did not take it today  BP is elevated, this has been a chronic issue and pt reports is compounded by anxiety  1 unit of PRBC is needed per office notes  Dialysis scheduled for tomorrow able to be delayed a bit as to give a unit of blood prior to dialysis  Office working on orders

## 2021-03-05 NOTE — ED PROVIDER NOTES
History  Chief Complaint   Patient presents with    Cardiac Arrest     Patient is a 58-year-old female with a history of multiple myeloma, diabetes, CHF and chronic kidney disease on hemodialysis who presents with cardiopulmonary arrest   Patient called 911 this evening for shortness of breath  She became unresponsive on the phone and had agonal respirations when EMS arrived  Shortly thereafter, she became apneic and lost pulses  She was intubated pre-hospital, but EMS notes that she had copious vomitus and likely aspirated  They confirmed placement of ET tube with end-tidal CO2  She was in PEA and received continuous CPR and multiple doses of IV epinephrine  Cardiac Arrest  Witnessed by:  Healthcare provider  Incident location:  Home  Time before ALS initiated:  Immediate  Condition upon EMS arrival:  Agonal respirations and unresponsive  Treatments prior to arrival:  ACLS protocol, intubation and vascular access  Medications given prior to ED:  Epinephrine  IV access type:  Peripheral  Airway:  Intubation prior to arrival  Rhythm on admission to ED:  PEA      Prior to Admission Medications   Prescriptions Last Dose Informant Patient Reported? Taking?    ARIPiprazole (ABILIFY) 30 mg tablet   No No   Sig: Take 1 tablet (30 mg total) by mouth daily at bedtime   CRANBERRY PO  Self Yes No   Sig: Take by mouth   Cholecalciferol (VITAMIN D3) 1000 units CAPS  Self Yes No   Sig: Take 1 capsule by mouth daily    DULoxetine (CYMBALTA) 60 mg delayed release capsule   No No   Sig: Take 1 capsule (60 mg total) by mouth 2 (two) times a day   LORazepam (ATIVAN) 2 mg tablet   No No   Sig: Take 1 tablet (2 mg total) by mouth 3 (three) times a day as needed for anxiety To be filled on or after 3/27/21   aspirin 81 MG tablet  Self Yes No   Sig: Take 1 tablet by mouth daily   busPIRone (BUSPAR) 15 mg tablet   No No   Sig: Take 1 tablet (15 mg total) by mouth 2 (two) times a day   calcium acetate (PHOSLO) capsule  Self Yes No Sig: Take 667 mg by mouth 3 (three) times a day with meals   diphenoxylate-atropine (LOMOTIL) 2 5-0 025 mg per tablet  Self No No   Sig: Take 1 tablet by mouth 4 (four) times a day as needed for diarrhea   folic acid (FOLVITE) 1 mg tablet  Self No No   Sig: TAKE 1 TABLET BY MOUTH EVERY DAY AS DIRECTED   glucose blood test strip  Self No No   Sig: Test 4 times daily   insulin glargine (Toujeo Max SoloStar) 300 units/mL CONCETRATED U-300 injection pen (2-unit dial)   No No   Sig: Inject 6 Units under the skin daily at bedtime   insulin lispro (HumaLOG KwikPen) 100 units/mL injection pen  Self No No   Sig: Use up to 20 units daily via sliding scale   levothyroxine 125 mcg tablet  Self No No   Sig: TAKE 1 TABLET BY MOUTH EVERY DAY   loperamide (IMODIUM A-D) 2 MG tablet  Self No No   Sig: Take 1 tablet (2 mg total) by mouth 4 (four) times a day as needed for diarrhea   metoprolol tartrate (LOPRESSOR) 50 mg tablet  Self No No   Sig: TAKE 1 TABLET TWICE DAILY   pantoprazole (PROTONIX) 40 mg tablet  Self No No   Sig: Take 1 tablet (40 mg total) by mouth 2 (two) times a day before meals   pravastatin (PRAVACHOL) 80 mg tablet  Self No No   Sig: TAKE 1 TABLET BY MOUTH EVERY DAY   predniSONE 5 mg tablet  Self No No   Sig: One tablet daily   rOPINIRole (REQUIP) 0 25 mg tablet  Self No No   Sig: Take 1 tablet (0 25 mg total) by mouth 2 (two) times a day   sertraline (ZOLOFT) 100 mg tablet   No No   Sig: Take 2 tablets (200 mg total) by mouth daily   tacrolimus (PROGRAF) 0 5 mg capsule  Self No No   Sig: Take 2 capsules (1 mg total) by mouth 2 (two) times a day   torsemide (DEMADEX) 20 mg tablet   No No   Sig: Take 1 tablet (20 mg total) by mouth daily      Facility-Administered Medications: None       Past Medical History:   Diagnosis Date    Abnormal liver function test     Acute kidney injury ([facility] [address] )     Acute on chronic congestive heart failure (HCC)     Allergic urticaria     Anemia     Cancer (HCC)     Multiple myeloma  Cervical dysplasia     Cholelithiasis     Chronic diastolic (congestive) heart failure ([facility] [address] [facility] 9/18/2017    [facility]     Previous, controlled with diet    Diabetes mellitus with foot ulcer ([facility] [address] [facility]     Disease of thyroid gland     Encephalopathy     Hematuria     + leak est- secondary to UTIs/panc drainage    History of transfusion     Hyperkalemia     Hypertension     Iliotibial band syndrome     Lumbar radiculopathy     Multiple myeloma (HCC)     Multiple myeloma ([facility] [address] [facility]     Night blindness     Nonrheumatic aortic (valve) insufficiency     Pneumonia     Renal disorder     Retinopathy     Seborrhea     Seizure ([facility] [address] [facility]     Shingles     Sinus tachycardia     B blocker - cardio echo stress test 02 normal/neg LE doppler 2/02 OK and 12/07    Status post simultaneous kidney and pancreas transplant ([facility] [address] )     Toe amputation status     Trochanteric bursitis        Past Surgical History:   Procedure Laterality Date    CATARACT EXTRACTION      CHOLECYSTECTOMY      COLONOSCOPY      two polyps in the rectum removed and biopsied diverticulosis in the sigmoid colon, external hemorrhoiods- Dr Barfield    COMBINED KIDNEY-PANCREAS TRANSPLANT N/A     CT BONE MARROW BIOPSY AND ASPIRATION  4/17/2019    CYSTOSCOPY N/A 10/13/2016    Procedure: CYSTOSCOPY, retrograde pyelogram, biopsy of ureteral polyp; Surgeon: Charli Hernandez MD;  Location: [facility];  Service:    [place] DILATION AND CURETTAGE OF UTERUS      ESOPHAGOGASTRODUODENOSCOPY N/A 11/20/2017    Procedure: ESOPHAGOGASTRODUODENOSCOPY (EGD); Surgeon: Grace Dodd DO;  Location: [facility];   Service: Gastroenterology    EYE SURGERY      cataracts    FOOT AMPUTATION THROUGH METATARSAL Left     FOOT SURGERY Right     excision of metatarsal heads    HALLUX VALGUS CORRECTION Right     IR TUNNELED CENTRAL LINE REMOVAL  11/13/2020    IR TUNNELED DIALYSIS CATHETER PLACEMENT  5/5/2020    NEPHRECTOMY TRANSPLANTED ORGAN      PANCREATIC TRANSPLANT REMOVAL      KS ANASTOMOSIS,AV,ANY SITE Right 2020    Procedure: Creation of right brachiocephalic fistula; Surgeon: Francesca Dunn MD;  Location: [facility];  Service: Vascular       Family History   Problem Relation Age of Onset    Hypertension Mother     Cancer Mother     Hypertension Father     Cancer Father     Cancer Maternal Grandfather     Cancer Paternal Grandmother     Cancer Paternal Grandfather     Depression Sister     Breast cancer Maternal Grandmother 77    Mental illness Paternal Uncle     Completed Suicide  Paternal Uncle     Drug abuse Other      I have reviewed and agree with the history as documented  E-Cigarette/Vaping    E-Cigarette Use Never User      E-Cigarette/Vaping Substances    Nicotine No     THC No     CBD No     Flavoring No     Other No     Unknown No      Social History     Tobacco Use    Smoking status: Former Smoker     Quit date: 2012     Years since quittin 8    Smokeless tobacco: Never Used   Substance Use Topics    Alcohol use: Never     Frequency: Never     Binge frequency: Never    Drug use: Never     Types: Hydrocodone     Comment: Past cocaine use many years ago - no current use       Review of Systems   Unable to perform ROS: Patient unresponsive       Physical Exam  Physical Exam  Constitutional:       General: She is in acute distress  Appearance: She is obese  Interventions: She is intubated  Comments: Unresponsive to painful stimuli   HENT:      Head: Normocephalic and atraumatic  No contusion  Nose: Nose normal       Mouth/Throat:      Comments: Vomitus visualized within ET tube and within oropharynx  ET tube in place with BVM ventilations in progress  Eyes:      Pupils: Pupils are unequal (Pupils dilated with left pupil slightly larger than right )  Right eye: Pupil is reactive  Left eye: Pupil is reactive  Neck:      Musculoskeletal: Neck supple  No injury     Cardiovascular: Pulses:           Femoral pulses are 0 on the right side and 0 on the left side  Comments: No heart sounds and no palpable carotid or femoral pulses  Pulmonary:      Effort: She is intubated  Breath sounds: Examination of the right-lower field reveals decreased breath sounds  Examination of the left-lower field reveals decreased breath sounds  Decreased breath sounds present  Comments: Breath sounds bilaterally with BVM assisted ventilations  No spontaneous respirations  Chest:      Chest wall: No crepitus  Abdominal:      General: Abdomen is protuberant  There are no signs of injury  Palpations: Abdomen is soft  Musculoskeletal:      Right lower leg: No edema  Left lower leg: No edema  Skin:     General: Skin is cool  Coloration: Skin is pale  Neurological:      Mental Status: She is unresponsive  GCS: GCS eye subscore is 1  GCS verbal subscore is 1  GCS motor subscore is 1  Comments: No gag reflex with OG tube placement    No corneal reflex         Vital Signs  ED Triage Vitals   Temp Pulse Respirations Blood Pressure SpO2   -- 03/05/21 1757 03/05/21 1822 03/05/21 1758 03/05/21 1757    (!) 130 (!) 25 (!) 178/55 99 %      Temp src Heart Rate Source Patient Position - Orthostatic VS BP Location FiO2 (%)   -- 03/05/21 1757 03/05/21 1822 03/05/21 1822 03/05/21 1827    Monitor Lying Right arm 60      Pain Score       --                  Vitals:    03/05/21 1757 03/05/21 1758 03/05/21 1822   BP:  (!) 178/55 112/58   Pulse: (!) 130  82   Patient Position - Orthostatic VS:   Lying         Visual Acuity      ED Medications  Medications   vancomycin (VANCOCIN) 1,500 mg in sodium chloride 0 9 % 250 mL IVPB (has no administration in time range)   cefepime (MAXIPIME) 2 g/50 mL dextrose IVPB (2,000 mg Intravenous New Bag 3/5/21 1845)   metroNIDAZOLE (FLAGYL) IVPB (premix) 500 mg 100 mL (has no administration in time range)   EPINEPHrine (ADRENALIN) injection SOSY (1 mg Intravenous Given 3/5/21 1755)   calcium chloride 1 g/10 mL injection (1 g Intravenous Given 3/5/21 1824)       Diagnostic Studies  Results Reviewed     Procedure Component Value Units Date/Time    Blood gas, arterial [[id number]] Collected: 03/05/21 1837    Lab Status: In process Specimen: Blood, Arterial from Radial, Left Updated: 03/05/21 1848    UA w Reflex to Microscopic w Reflex to Culture [[id number]] Collected: 03/05/21 1842    Lab Status: In process Specimen: Urine, Indwelling Moreno Catheter Updated: 03/05/21 [address], Influenza A/B, RSV PCR, [facility] [[id number]] Collected: 03/05/21 1845    Lab Status:  In process Specimen: Nares from Nasopharyngeal Swab Updated: 03/05/21 1848    Troponin I [[id number]]  (Abnormal) Collected: 03/05/21 1820    Lab Status: Final result Specimen: Blood from Arm, Left Updated: 03/05/21 1848     Troponin I 0 49 ng/mL     Comprehensive metabolic panel [[id number]]  (Abnormal) Collected: 03/05/21 1820    Lab Status: Final result Specimen: Blood from Arm, Left Updated: 03/05/21 1846     Sodium 140 mmol/L      Potassium 5 3 mmol/L      Chloride 102 mmol/L      CO2 17 mmol/L      ANION GAP 21 mmol/L      BUN 31 mg/dL      Creatinine 5 43 mg/dL      Glucose 473 mg/dL      Calcium 8 5 mg/dL      Corrected Calcium 9 9 mg/dL       U/L      ALT 64 U/L      Alkaline Phosphatase 250 U/L      Total Protein 6 1 g/dL      Albumin 2 3 g/dL      Total Bilirubin 0 50 mg/dL      eGFR 8 ml/min/1 73sq m     Narrative:      National Kidney Disease Foundation guidelines for Chronic Kidney Disease (CKD):     Stage 1 with normal or high GFR (GFR > 90 mL/min/1 73 square meters)    Stage 2 Mild CKD (GFR = 60-89 mL/min/1 73 square meters)    Stage 3A Moderate CKD (GFR = 45-59 mL/min/1 73 square meters)    Stage 3B Moderate CKD (GFR = 30-44 mL/min/1 73 square meters)    Stage 4 Severe CKD (GFR = 15-29 mL/min/1 73 square meters)    Stage 5 End Stage CKD (GFR <15 mL/min/1 73 square meters)  Note: GFR calculation is accurate only with a steady state creatinine    Salicylate level [[id number]]  (Abnormal) Collected: 03/05/21 1820    Lab Status: Final result Specimen: Blood from Arm, Left Updated: 12/71/73 3807     Salicylate Lvl <3 mg/dL     Acetaminophen level-If concentration is detectable, please discuss with medical  on call  [[id number]]  (Abnormal) Collected: 03/05/21 1820    Lab Status: Final result Specimen: Blood from Arm, Left Updated: 03/05/21 1846     Acetaminophen Level <2 ug/mL     Magnesium [[id number]]  (Normal) Collected: 03/05/21 1820    Lab Status: Final result Specimen: Blood from Arm, Left Updated: 03/05/21 1846     Magnesium 2 5 mg/dL     Blood culture #1 [[id number]] Collected: 03/05/21 1820    Lab Status: In process Specimen: Blood from Arm, Left Updated: 03/05/21 1837    Lactic acid [[id number]] Collected: 03/05/21 1820    Lab Status: In process Specimen: Blood from Arm, Left Updated: 03/05/21 1827    Ethanol [[id number]] Collected: 03/05/21 1820    Lab Status: In process Specimen: Blood from Arm, Left Updated: 03/05/21 1827    Protime-INR [[id number]] Collected: 03/05/21 1820    Lab Status: In process Specimen: Blood from Arm, Left Updated: 03/05/21 1827    APTT [[id number]] Collected: 03/05/21 1820    Lab Status: In process Specimen: Blood from Arm, Left Updated: 03/05/21 1827    CBC and differential [[id number]] Collected: 03/05/21 1820    Lab Status: In process Specimen: Blood from Arm, Left Updated: 03/05/21 1827    Procalcitonin with AM Reflex [[id number]] Collected: 03/05/21 1820    Lab Status: In process Specimen: Blood from Arm, Left Updated: 03/05/21 1827    Blood culture #2 [[id number]] Collected: 03/05/21 1826    Lab Status:  In process Specimen: Blood Updated: 03/05/21 1827                 XR chest 1 view portable    (Results Pending)   CT head without contrast    (Results Pending)              Procedures  ECG 12 Lead Documentation Only    Date/Time: 3/5/2021 9:15 PM  Performed by: Brendan Jones DO  Authorized by: Brendan Jones DO     ECG reviewed by me, the ED Provider: yes    Patient location:  ED  Previous ECG:     Previous ECG:  Compared to current    Similarity:  No change    Comparison to cardiac monitor: Yes    Comments:      Sinus tachycardia at a rate of 130 beats per minute  Left axis deviation  Intraventricular conduction delay  Nonspecific ST T wave abnormalities  Rate change from previous EKG from 09/16/2020  CriticalCare Time  Performed by: Brendan Jones DO  Authorized by: Brendan Jones DO     Critical care provider statement:     Critical care start time:  3/5/2021 6:00 PM    Critical care end time:  3/5/2021 7:00 PM    Critical care time was exclusive of:  Separately billable procedures and treating other patients    Critical care was necessary to treat or prevent imminent or life-threatening deterioration of the following conditions:  Circulatory failure and respiratory failure    Critical care was time spent personally by me on the following activities:  Development of treatment plan with patient or surrogate, discussions with consultants, evaluation of patient's response to treatment, examination of patient, ordering and review of laboratory studies, ordering and review of radiographic studies, re-evaluation of patient's condition, review of old charts and ventilator management             ED Course                                           MDM  Number of Diagnoses or Management Options  Cardiopulmonary arrest [facility]): new and requires workup  Diagnosis management comments: Patient presented to the emergency department in cardiopulmonary arrest   She called 911 complaining of shortness of breath  She had respiratory arrest followed by cardiac arrest   She was in PEA upon arrival   She was intubated pre-hospital   I confirmed ET tube placement using the glide scope    After multiple rounds of CPR and IV epinephrine, patient had return of spontaneous circulation  She has maintained pulses and an adequate blood pressure following ROSC  EKG reveals no evidence of ST elevation  I do not feel there is an emergent need for cardiac catheterization at this time  I did order calcium chloride due to end-stage renal disease on hemodialysis  Awaiting results of labs, including CMP  Will start antibiotics for possible aspiration pneumonia  I discussed care with mother who understands patient's poor prognosis at this time  Critical care has accepted patient and evaluated her at bedside  Will transfer to ICU  Amount and/or Complexity of Data Reviewed  Clinical lab tests: ordered and reviewed  Tests in the radiology section of CPT®: ordered and reviewed  Tests in the medicine section of CPT®: ordered and reviewed  Review and summarize past medical records: yes  Discuss the patient with other providers: yes  Independent visualization of images, tracings, or specimens: yes    Risk of Complications, Morbidity, and/or Mortality  Presenting problems: high  Diagnostic procedures: high  Management options: high        Disposition  Final diagnoses:   None     ED Disposition     None      Follow-up Information    None         Patient's Medications   Discharge Prescriptions    No medications on file     No discharge procedures on file      PDMP Review       Value Time User    PDMP Reviewed  Yes 2/18/2021  5:54 PM Bryan Ferguson MD          ED Provider  Electronically Signed by           Francisco Fraga DO  03/05/21 1412

## 2021-03-06 NOTE — CONSULTS
2           Consultation - Nephrology   Grace Elder 63 y o  female MRN: [id number]  Unit/Bed#: ICU 06 Encounter: [id number]      Assessment/Plan     Assessment / Plan:  1  Renal    Patient is end-stage renal disease hemodialysis is normally Tuesday Thursday Saturday  The patient was receiving her normal dialysis schedule unfortunately she had a cardiac arrest is now intubated on the ventilator  Initially she was hypotensive now she is hypertensive on Cardene drip so she will tolerate intermittent hemodialysis  There is a big concern for anoxic brain injury but at this point still continuing care as I spoke to the ICU team earlier  Hemodialysis today and plan for 3 hour treatment blood flow 350 cc/minute dialysate flow 800 cc/minute  Monitor hemodynamically and UF to dry weight  2  Patient suffered hypoxic respiratory failure and PE a arrest   Patient is being care for by ICU team remains intubated and no longer hypotensive and actually on Cardene drip for blood pressure control  3  History of multiple myeloma     As an addendum to this note to I was contacted by the [address] [place], [place] [id number] Nisula, and told that the family was going to withdraw care so no dialysis is desired I alerted the dialysis nurse and will DC the order so no dialysis today  History of Present Illness   Physician Requesting Consult: Kae Cannon DO  Reason for Consult / Principal Problem:  End-stage renal disease  Hx and PE limited by:   HPI: Grace Elder is a 63y o  year old female who has end-stage renal disease and undergoes dialysis Tuesday Thursday Saturday  The patient is unable to provide history but according to the chart apparently the patient developed some shortness of breath called EMS and then upon their arrival she was agonal and developed a PE a arrest and respiratory failure    She was resuscitated and revived and now is on the ventilator were asked to see her regarding end-stage renal disease and dialysis  History obtained from chart review and unobtainable from patient due to mental status  Inpatient consult to Nephrology  Consult performed by: Sly Huerta MD  Consult ordered by: Brent Bailey PA-C          Review of Systems    Patient unable to provide review of systems for me given her clinical state      Historical Information   Patient Active Problem List   Diagnosis    Renal transplant recipient    Chronic bacteriuria, likely colonization    Acquired hypothyroidism    Hypertension    Toxic metabolic encephalopathy    Colitis    Diabetes mellitus type I, controlled ([facility]    Anemia    Status post kidney transplant    Immunosuppression ([facility]    Abdominal pain    Weakness    Chronic diastolic congestive heart failure ([facility])    Urinary incontinence    Calculus, bladder, diverticulum    Intractable nausea and vomiting    Hypercalcemia    Chronic constipation    Atrial fibrillation ([facility])    Kaiser esophagus    Cataract    Cocaine abuse in remission ([facility]    Gastric and duodenal disease    Diabetic nephropathy ([facility])    Diabetic polyneuropathy ([facility])    Retinopathy, diabetic, bilateral ([facility]    Diastolic dysfunction    Essential and other specified forms of tremor    Failure of pancreas transplant    Hyperlipidemia    Irritable bowel syndrome    Major depressive disorder, recurrent, severe without psychotic features ([facility]    Aortic regurgitation    OAB (overactive bladder)    Osteoporosis    Peripheral vascular disease ([facility])    Post-traumatic stress disorder, chronic    Restless legs syndrome    Secondary hyperparathyroidism, renal ([address] )    Neuropathy in diabetes ([facility]    History of herpes zoster    Subclavian artery stenosis, left ([facility])    Abnormal ECG    Multiple myeloma not having achieved remission ([facility])    Chronic kidney disease-mineral and bone disorder    Metabolic acidosis    Ambulatory dysfunction    Low bicarbonate    Urinary retention    Diarrhea    Asymptomatic bacteriuria    Hyperphosphatemia    Acquired keratoderma    Disorder of bone and articular cartilage    ESRD (end stage renal disease) (HCC)    S/P arteriovenous (AV) fistula creation    Type 1 diabetes mellitus with hyperglycemia (HCC)    Urinary tract infection without hematuria    Hyperkalemia    Ulcer of esophagus without bleeding    Duodenal ulcer    Nausea    FELIPE (generalized anxiety disorder)    Anxiety    Cardiac arrest [facility])     Past Medical History:   Diagnosis Date    Abnormal liver function test     Acute kidney injury ([facility]     Acute on chronic congestive heart failure (HCC)     Allergic urticaria     Anemia     Cancer (HCC)     Multiple myeloma    Cervical dysplasia     Cholelithiasis     Chronic diastolic (congestive) heart failure ([facility] ) 9/18/2017    Diabetes mellitus (HCC)     Previous, controlled with diet    Diabetes mellitus with foot ulcer ([facility]     Disease of thyroid gland     Encephalopathy     Hematuria     + leak est- secondary to UTIs/panc drainage    History of transfusion     Hyperkalemia     Hypertension     Iliotibial band syndrome     Lumbar radiculopathy     Multiple myeloma (HCC)     Multiple myeloma (HCC)     Night blindness     Nonrheumatic aortic (valve) insufficiency     Pneumonia     Renal disorder     Retinopathy     Seborrhea     Seizure ([facility]     Shingles     Sinus tachycardia     B blocker - cardio echo stress test 02 normal/neg LE doppler 2/02 OK and 12/07    Status post simultaneous kidney and pancreas transplant ([facility] )     Toe amputation status     Trochanteric bursitis      Past Surgical History:   Procedure Laterality Date    CATARACT EXTRACTION      CHOLECYSTECTOMY      COLONOSCOPY      two polyps in the rectum removed and biopsied diverticulosis in the sigmoid colon, external hemorrhoiods- Dr Barfield    COMBINED KIDNEY-PANCREAS TRANSPLANT N/A     CT BONE MARROW BIOPSY AND ASPIRATION 2019    CYSTOSCOPY N/A 10/13/2016    Procedure: CYSTOSCOPY, retrograde pyelogram, biopsy of ureteral polyp; Surgeon: Osmel Mckay MD;  [facility];  Service:    Isabelle Armando DILATION AND CURETTAGE OF UTERUS      ESOPHAGOGASTRODUODENOSCOPY N/A 2017    Procedure: ESOPHAGOGASTRODUODENOSCOPY (EGD); Surgeon: Mary Ann Her DO;  Location: [facility]; Service: Gastroenterology    EYE SURGERY      cataracts    FOOT AMPUTATION THROUGH METATARSAL Left     FOOT SURGERY Right     excision of metatarsal heads    HALLUX VALGUS CORRECTION Right     IR TUNNELED CENTRAL LINE REMOVAL  2020    IR TUNNELED DIALYSIS CATHETER PLACEMENT  2020    NEPHRECTOMY TRANSPLANTED ORGAN      PANCREATIC TRANSPLANT REMOVAL      OK ANASTOMOSIS,AV,ANY SITE Right 2020    Procedure: Creation of right brachiocephalic fistula;   Surgeon: Miesha Dunn MD;  Location: [facility];  Service: Vascular     Social History   Social History     Substance and Sexual Activity   Alcohol Use Never    Frequency: Never    Binge frequency: Never     Social History     Substance and Sexual Activity   Drug Use Never    Types: Hydrocodone    Comment: Past cocaine use many years ago - no current use     Social History     Tobacco Use   Smoking Status Former Smoker    Quit date: 2012    Years since quittin 8   Smokeless Tobacco Never Used     Family History   Problem Relation Age of Onset    Hypertension Mother     Cancer Mother     Hypertension Father     Cancer Father     Cancer Maternal Grandfather     Cancer Paternal Grandmother     Cancer Paternal Grandfather     Depression Sister     Breast cancer Maternal Grandmother 77    Mental illness Paternal Uncle     Completed Suicide  Paternal Uncle     Drug abuse Other        Meds/Allergies   current meds:   Current Facility-Administered Medications   Medication Dose Route Frequency    acetaminophen (TYLENOL) tablet 975 mg  975 mg Oral Q6H    busPIRone (BUSPAR) tablet 15 mg  15 mg Oral Q8H    cefepime (MAXIPIME) 1,000 mg in dextrose 5 % 50 mL IVPB  1,000 mg Intravenous Q12H    chlorhexidine (PERIDEX) 0 12 % oral rinse 15 mL  15 mL Swish & Spit Q12H [facility]    heparin (porcine) 25,000 units in 0 45% NaCl 250 mL infusion (premix)  3-20 Units/kg/hr (Order-Specific) Intravenous Titrated    heparin (porcine) injection 2,000 Units  2,000 Units Intravenous Q1H PRN    heparin (porcine) injection 4,000 Units  4,000 Units Intravenous Q1H PRN    heparin (porcine) subcutaneous injection 5,000 Units  5,000 Units Subcutaneous Q8H [facility]    insulin regular (HumuLIN R,NovoLIN R) 1 Units/mL in sodium chloride 0 9 % 100 mL infusion  0 3-21 Units/hr Intravenous Titrated    lactated ringers bolus 500 mL  500 mL Intravenous Once    levothyroxine tablet 125 mcg  125 mcg Oral Early Morning    metroNIDAZOLE (FLAGYL) IVPB (premix) 500 mg 100 mL  500 mg Intravenous Q8H    niCARdipine (CARDENE) 25 mg (STANDARD CONCENTRATION) in sodium chloride 0 9% 250 mL  1-15 mg/hr Intravenous Titrated    norepinephrine (LEVOPHED) 4 mg (STANDARD CONCENTRATION) IV in sodium chloride 0 9% 250 mL  1-30 mcg/min Intravenous Titrated    pantoprazole (PROTONIX) injection 40 mg  40 mg Intravenous Q12H [facility]    vancomycin (VANCOCIN) IVPB (premix in dextrose) 750 mg 150 mL  10 mg/kg (Adjusted) Intravenous After Dialysis     Facility-Administered Medications Ordered in Other Encounters   Medication Dose Route Frequency    [MAR Hold] LORazepam (ATIVAN) injection 0 5 mg  0 5 mg Intravenous Q6H PRN     Allergies   Allergen Reactions    Penicillins Hives and Shortness Of Breath     However, has tolerated Cephalexin and Zosyn which are similar side chain   Has also tolerated Cephs with different side chains such as: Ceftriaxone, Cefepime, Cefdinir, Cefpodoxime    Ixazomib Rash     Ninlaro    Revlimid [Lenalidomide] Rash    Cefadroxil Other (See Comments)     Unlikely allergy, as patient has tolerated Cephalexin which looks almost the same  Has also tolerated other Cephs with different side chains such as: Ceftriaxone, Cefepime, Cefdinir, Cefpodoxime   Morphine Other (See Comments)     Other reaction(s): Other (See Comments)  projectile vomiting    Morphine And Related GI Intolerance    Myrbetriq [Mirabegron] Hives       Objective     Intake/Output Summary (Last 24 hours) at 3/6/2021 1145  Last data filed at 3/6/2021 1000  Gross per 24 hour   Intake 3385 37 ml   Output 2150 ml   Net 1235 37 ml     Body mass index is 30 7 kg/m²  Invasive Devices:   Urethral Catheter Temperature probe 16 Fr  (Active)   Reasons to continue Urinary Catheter  Accurate I&O assessment in critically ill patients (48 hr  max) 03/06/21 0800   Site Assessment Clean;Skin intact 03/06/21 0800   Collection Container Standard drainage bag 03/06/21 0800   Securement Method Securing device (Describe) 03/06/21 0800   Output (mL) 0 mL 03/06/21 0800       PHYSICAL EXAM:  /70   Pulse 87   Temp (!) 97 °F (36 1 °C)   Resp (!) 28   Ht 5' 7" (1 702 m)   Wt 88 9 kg (195 lb 15 8 oz)   LMP  (LMP Unknown)   SpO2 100%   BMI 30 70 kg/m²     Physical Exam  Constitutional:       General: She is not in acute distress  Appearance: She is ill-appearing  She is not toxic-appearing or diaphoretic  HENT:      Head:      Comments: Orally intubated  Eyes:      Comments: Eyes closed   Cardiovascular:      Rate and Rhythm: Normal rate and regular rhythm  Heart sounds: No friction rub  No gallop  Comments: Mild edema  Pulmonary:      Effort: No respiratory distress  Breath sounds: No wheezing or rales  Comments: On ventilator  Abdominal:      General: Bowel sounds are normal  There is no distension  Palpations: Abdomen is soft     Neurological:      Comments: Unable to assess   Psychiatric:      Comments: Unable to assess           Current Weight: Weight - Scale: 88 9 kg (195 lb 15 8 oz)  First Weight: Weight - Scale: 88 9 kg (195 lb 15 8 oz)    Lab Results:    Results from last 7 days   Lab Units 03/06/21  0406   WBC Thousand/uL 7 62   HEMOGLOBIN g/dL 8 7*   HEMATOCRIT % 28 2*   PLATELETS Thousands/uL 209     Results from last 7 days   Lab Units 03/06/21  0406   POTASSIUM mmol/L 4 8   CHLORIDE mmol/L 104   CO2 mmol/L 20*   BUN mg/dL 40*   CREATININE mg/dL 5 48*   CALCIUM mg/dL 8 8     Results from last 7 days   Lab Units 03/06/21  0406   POTASSIUM mmol/L 4 8   CHLORIDE mmol/L 104   CO2 mmol/L 20*   BUN mg/dL 40*   CREATININE mg/dL 5 48*   CALCIUM mg/dL 8 8   ALK PHOS U/L 239*   ALT U/L 181*   AST U/L 354*

## 2021-03-06 NOTE — PROCEDURES
Insert arterial line    Date/Time: 3/6/2021 4:00 AM  Performed by: RT Santiago  Authorized by: Lutricia Galeazzi, PA-C     Patient location:  Bedside  Other Assisting Provider: Mona    Universal protocol:     Site/side marked: yes      Immediately prior to procedure a time out was called: yes      Patient identity confirmed:  Arm band and hospital-assigned identification number  Indications:     Indications: hemodynamic monitoring, continuous blood pressure monitoring and frequent labs / infusion    Pre-procedure details:     Skin preparation:  Chlorhexidine  Anesthesia (see MAR for exact dosages): Anesthesia method:  None  Procedure details:     Location / Tip of Catheter:  Radial    Laterality:  Left    Seng's test performed: yes      Seng's test abnormal: no      Needle gauge:  20 G    Placement technique:  Seldinger    Number of attempts:  1    Successful placement: yes      Transducer: waveform confirmed    Post-procedure details:     Post-procedure:  Sterile dressing applied, sutured and wrist guard applied    Patient tolerance of procedure:   Tolerated well, no immediate complications

## 2021-03-06 NOTE — CONSULTS
[facility]  Department of Hematology & Medical Oncology  Inpatient Consultation/Progress Note    Date: 03/06/21          ASSESSMENT & PLAN      58-year-old female with multiple medical comorbidities including IgG kappa multiple myeloma since April 2019 currently hospitalized on March 5, 2021 with cardiac arrest   She is currently being managed for with daratumumab for her multiple myeloma after undergoing treatment multiple lines of therapy  She was given her cycle 1 of daratumumab on March 5, 2021 and she had tolerated her therapy as per notes before being discharged from the infusion center after observation for 4 hours  Assessment:   Primary team and other specialists are managing her care at the moment in addition to understanding what caused her to have this cardiac arrest that is driven by her hypoxemic respiratory failure and PEA arrest   There is ongoing suspicion that she has a pulmonary embolism as a cause of her acute illness   Daratumumab has a well-documented infusion reaction as a potential adverse effect  Most commonly these infusion reactions only her other during the infusion or within 4 hours of completion of the administration  It is not entirely clear if this medication was the cause of her symptomatology that led to the hypoxemic respiratory failure  As per the notes, the patient was observed and she experienced no adverse effects hence it appears unlikely that this medication is responsible for causing this medical illness  In clinical trials, there have been scarce reports of patients experiencing medication related infusion reaction beyond the 4 hour window  Plan:   Defer ongoing care to her ICU team and other specialists   With regards to her multiple myeloma and treatment, a decision for management including the decision for further daratumumab administration will be made by her treating oncologist as an outpatient      Please contact me if any questions or concerns about this patient's case  Thank you  SUBJECTIVE      58-year-old female with multiple medical comorbidities including history of chronic kidney disease status post kidney and pancreas transplant who is currently on dialysis and also diagnosed with multiple myeloma admitted yesterday with cardiac arrest   Hematology is consulted because patient has a diagnosis of myeloma with treatment given the day of her cardiac arrest     In review of her medical chart, she follows Dr Rachel Humphries as outpatient has been diagnosed with IgG kappa multiple myeloma since April 2019 after having a diagnosis of smoldering myeloma in September 2017  She has been managed with multiple lines of therapy and most recently has been placed on daratumumab  She was last seen in the office on February 17, 2021  She got her cycle 1 day 1 of daratumumab on March 5, 2021  As per notes, she tolerated her dose of daratumumab without any problems and was also on observation  The plan thereafter was for her to return the following day for a unit of blood transfusion given her hemoglobin was 7 2 as an outpatient  Unfortunately, later that evening, she called 911 because she was experiencing dyspnea and she became unresponsive and had agonal breathing upon arrival of EMS  She underwent a cardiopulmonary arrest and was intubated and brought to the emergency room  That the since being hospitalized, she is in the ICU receiving care and ongoing workup management is being placed  I have reviewed the relevant past medical, surgical, social and family history  I have also reviewed allergies and medications for this patient      Review of Systems  Review of Systems   Unable to perform ROS: acuity of condition         OBJECTIVE     Physical Exam    Vitals:    03/06/21 0600 03/06/21 0800 03/06/21 1000 03/06/21 1002   BP:       Pulse: 81 80     Resp: 20      Temp: (!) 97 2 °F (36 2 °C)      TempSrc: Esophageal      SpO2: 100% Weight:    88 9 kg (195 lb 15 8 oz)   Height:   5' 7" (1 702 m)          Physical Exam  Vitals signs reviewed  Constitutional:       General: She is in acute distress  Appearance: She is ill-appearing  Pulmonary:      Comments: On a ventilator  Abdominal:      General: There is no distension     Neurological:      Comments: Sedated          Imaging  Relevant imaging reviewed in chart    Labs  Relevant labs reviewed in chart    CBC  Diff   Lab Results   Component Value Date/Time    WBC 7 62 03/06/2021 04:06 AM    WBC 11 65 (H) 12/14/2015 01:23 PM    HGB 8 7 (L) 03/06/2021 04:06 AM    HGB 13 6 12/14/2015 01:23 PM    HCT 28 2 (L) 03/06/2021 04:06 AM    HCT 40 8 12/14/2015 01:23 PM    RBC 2 54 (L) 03/06/2021 04:06 AM    RBC 4 41 12/14/2015 01:23 PM     (H) 03/06/2021 04:06 AM    MCV 93 12/14/2015 01:23 PM    MCHC 30 9 (L) 03/06/2021 04:06 AM    MCHC 33 3 12/14/2015 01:23 PM    MCH 34 3 03/06/2021 04:06 AM    MCH 30 8 12/14/2015 01:23 PM    RDW 15 9 (H) 03/06/2021 04:06 AM    RDW 15 7 (H) 12/14/2015 01:23 PM    MPV 11 7 03/06/2021 04:06 AM    MPV 13 0 (H) 12/14/2015 01:23 PM    Lab Results   Component Value Date/Time    LYMPHSABS 1 56 03/04/2021 07:14 AM    LYMPHSABS 2 91 12/14/2015 01:23 PM    EOSABS 0 15 03/05/2021 06:20 PM    EOSABS 0 96 (H) 03/04/2021 07:14 AM    EOSABS 0 23 12/14/2015 01:23 PM    MONOSABS 0 59 03/04/2021 07:14 AM    MONOSABS 0 70 12/14/2015 01:23 PM    BASOSABS 0 00 03/05/2021 06:20 PM    BASOSABS 0 08 03/04/2021 07:14 AM    BASOSABS 0 12 (H) 12/14/2015 01:23 PM        Basic Metabolic Profile    Lab Results   Component Value Date/Time    SODIUM 141 03/06/2021 04:06 AM    CHLORIDE 107 09/13/2017 05:36 PM    CO2 20 (L) 03/06/2021 04:06 AM    CO2 24 09/13/2017 05:36 PM    ANIONGAP 6 01/06/2016 06:32 AM    Lab Results   Component Value Date/Time    BUN 40 (H) 03/06/2021 04:06 AM    BUN 31 (H) 01/06/2016 06:32 AM    CREATININE 5 48 (H) 03/06/2021 04:06 AM    CREATININE 1 72 (H) 01/06/2016 06:32 AM    GLUCOSE 103 09/13/2017 05:36 PM    GLUCOSE 149 (H) 01/06/2016 06:32 AM               Parul Merrill MD  Hematology & [address] [facility]

## 2021-03-06 NOTE — CONSULTS
Consultation - Cardiology   Palomino Loss 63 y o  female MRN: [id number]  Unit/Bed#: ICU 06 Encounter: [id number]      Assessment:  Principal Problem:    Cardiac arrest [facility])  Active Problems:    Renal transplant recipient    Acquired hypothyroidism    Toxic metabolic encephalopathy    Diabetes mellitus type I, controlled ([place] [address] )    Anemia    Status post kidney transplant    Immunosuppression ([place] [address] )    Chronic diastolic congestive heart failure (HCC)    Atrial fibrillation ([place] [address] )    Kaiser esophagus    Diabetic nephropathy ([place] [address] )    Failure of pancreas transplant    Multiple myeloma not having achieved remission ([place] [address] )    ESRD (end stage renal disease) ([place] [address] )    Type 1 diabetes mellitus with hyperglycemia ([place] [address] )      Plan:    1  Hypoxic respiratory and PEA arrest - Her cardiac arrest appears all respiratory driven given PEA arrest and her hypoxia upon presentation  Discussed with critical care and agree that PE is suspected  Continue IV heparin  Support otherwise per Critical Care  Currently intubated and sedated  2   Type 2 MI - In the setting of her acute hypoxic respiratory failure/arrest, she had a significantly elevated troponin  ECGs not significantly changed  This can also represent non MI troponin elevation if pulmonary embolism is found, or could represent demand mediated elevation in the setting of underlying coronary artery disease  We will check an echocardiogram   Continue IV heparin and support otherwise  3   Hypertensive urgency - BP now elevated and Cardene drip was started  Would also use beta-blocker  4   Chronic diastolic CHF and moderate aortic regurgitation - Lakesha's last echocardiogram showed normal LV systolic function and aortic regurgitation  As stated we are checking an updated echocardiogram   Volume control per hemodialysis        History of Present Illness   Physician Requesting Consult: Radha Hutchison DO  Reason for Consult / Principal Problem: MI and Cardiac arrest    HPI: Bigg Cummings is a 63y o  year old female who carries a history of type 1 diabetes mellitus, for chronic kidney disease, who is status post both kidney and pancreas transplant but remains on hemodialysis, along with carried a history of diastolic CHF and multiple myeloma, developed worsening respiratory distress and shortness of breath home  911 was called and patient deteriorated from a respiratory status  This led to acute respiratory failure, a hypoxic respiratory arrest leading to a PEA arrest   She required multiple rounds of epinephrine, and circulation was restored  She is now intubated and sedated in the ICU  Other than the report of worsening shortness of breath, no other symptoms were reported  In the setting of this she was found to have a significantly elevated troponin  ECG did not significantly change compared to prior  Her last echocardiogram showed normal LV systolic function with moderate aortic regurgitation  Consults    Review of Systems:  Unobtainable secondary to the patient being intubated and sedated      Historical Information   Past Medical History:   Diagnosis Date    Abnormal liver function test     Acute kidney injury ([address] )     Acute on chronic congestive heart failure (HCC)     Allergic urticaria     Anemia     Cancer (HCC)     Multiple myeloma    Cervical dysplasia     Cholelithiasis     Chronic diastolic (congestive) heart failure ([address] ) 9/18/2017    Diabetes mellitus ([address] )     Previous, controlled with diet    Diabetes mellitus with foot ulcer ([address] )     Disease of thyroid gland     Encephalopathy     Hematuria     + leak est- secondary to UTIs/panc drainage    History of transfusion     Hyperkalemia     Hypertension     Iliotibial band syndrome     Lumbar radiculopathy     Multiple myeloma (HCC)     Multiple myeloma ([address] )     Night blindness     Nonrheumatic aortic (valve) insufficiency     Pneumonia     Renal disorder     Retinopathy     Seborrhea     [facility]     Shingles     Sinus tachycardia     B blocker - cardio echo stress test 02 normal/neg LE doppler  OK and     Status post simultaneous kidney and pancreas transplant ([facility] )     Toe amputation status     Trochanteric bursitis      Past Surgical History:   Procedure Laterality Date    CATARACT EXTRACTION      CHOLECYSTECTOMY      COLONOSCOPY      two polyps in the rectum removed and biopsied diverticulosis in the sigmoid colon, external hemorrhoiods- Dr Barfield    COMBINED KIDNEY-PANCREAS TRANSPLANT N/A     CT BONE MARROW BIOPSY AND ASPIRATION  2019    CYSTOSCOPY N/A 10/13/2016    Procedure: CYSTOSCOPY, retrograde pyelogram, biopsy of ureteral polyp; Surgeon: Phyllis Padgett MD;  Location: [facility];  Service:    Radha Sawyer DILATION AND CURETTAGE OF UTERUS      ESOPHAGOGASTRODUODENOSCOPY N/A 2017    Procedure: ESOPHAGOGASTRODUODENOSCOPY (EGD); Surgeon: Wolfgang Park DO;  Location: [facility]; Service: Gastroenterology    EYE SURGERY      cataracts    FOOT AMPUTATION THROUGH METATARSAL Left     FOOT SURGERY Right     excision of metatarsal heads    HALLUX VALGUS CORRECTION Right     IR TUNNELED CENTRAL LINE REMOVAL  2020    IR TUNNELED DIALYSIS CATHETER PLACEMENT  2020    NEPHRECTOMY TRANSPLANTED ORGAN      PANCREATIC TRANSPLANT REMOVAL      OH ANASTOMOSIS,AV,ANY SITE Right 2020    Procedure: Creation of right brachiocephalic fistula;   Surgeon: Jose Cruz Dunn MD;  Location: [facility];  Service: Vascular     Social History     Substance and Sexual Activity   Alcohol Use Never    Frequency: Never    Binge frequency: Never     Social History     Substance and Sexual Activity   Drug Use Never    Types: Hydrocodone    Comment: Past cocaine use many years ago - no current use     Social History     Tobacco Use   Smoking Status Former Smoker    Quit date: 2012    Years since quittin 8   Smokeless Tobacco Never Used     Family History: non-contributory    Meds/Allergies   all current active meds have been reviewed  Allergies   Allergen Reactions    Penicillins Hives and Shortness Of Breath     However, has tolerated Cephalexin and Zosyn which are similar side chain  Has also tolerated Cephs with different side chains such as: Ceftriaxone, Cefepime, Cefdinir, Cefpodoxime    Ixazomib Rash     Ninlaro    Revlimid [Lenalidomide] Rash    Cefadroxil Other (See Comments)     Unlikely allergy, as patient has tolerated Cephalexin which looks almost the same  Has also tolerated other Cephs with different side chains such as: Ceftriaxone, Cefepime, Cefdinir, Cefpodoxime   Morphine Other (See Comments)     Other reaction(s):  Other (See Comments)  projectile vomiting    Morphine And Related GI Intolerance    Myrbetriq [Mirabegron] Hives         Current Facility-Administered Medications:     acetaminophen (TYLENOL) tablet 975 mg, 975 mg, Oral, Q6H, Paulino Corona PA-C, 975 mg at 03/06/21 0425    busPIRone (BUSPAR) tablet 15 mg, 15 mg, Oral, Q8H, Paulino Corona PA-C, 15 mg at 03/06/21 0522    cefepime (MAXIPIME) 1,000 mg in dextrose 5 % 50 mL IVPB, 1,000 mg, Intravenous, Q12H, Paulino Corona PA-C, Last Rate: 100 mL/hr at 03/06/21 0641, 1,000 mg at 03/06/21 0641    chlorhexidine (PERIDEX) 0 12 % oral rinse 15 mL, 15 mL, [employer], Q12H [address], Paulino Corona PA-C, 15 mL at 03/05/21 2221    heparin (porcine) 25,000 units in 0 45% NaCl 250 mL infusion (premix), 3-20 Units/kg/hr (Order-Specific), Intravenous, Titrated, MER Yu    heparin (porcine) injection 2,000 Units, 2,000 Units, Intravenous, Q1H PRN, Sherrin Single, CRNP    heparin (porcine) injection 4,000 Units, 4,000 Units, Intravenous, Once, Sherrin Single, CRNP    heparin (porcine) injection 4,000 Units, 4,000 Units, Intravenous, Q1H PRN, Sherrin Single, CRNP    heparin (porcine) subcutaneous injection 5,000 Units, 5,000 Units, Subcutaneous, Q8H [address], 5,000 Units at 03/06/21 0522 **AND** [CANCELED] Platelet count, , , Once, Nilda Marie PA-C    insulin regular (HumuLIN R,NovoLIN R) 1 Units/mL in sodium chloride 0 9 % 100 mL infusion, 0 3-21 Units/hr, Intravenous, Titrated, Nilda Marie PA-C, Last Rate: 1 mL/hr at 03/06/21 0558, 1 Units/hr at 03/06/21 0558    lactated ringers bolus 500 mL, 500 mL, Intravenous, Once, [employer], CRNP    levothyroxine tablet 125 mcg, 125 mcg, Oral, Early Morning, Nilda Marie PA-C, 125 mcg at 03/06/21 0522    metroNIDAZOLE (FLAGYL) IVPB (premix) 500 mg 100 mL, 500 mg, Intravenous, Q8H, Nilda Marie PA-C, Stopped at 03/06/21 0130    niCARdipine (CARDENE) 25 mg (STANDARD CONCENTRATION) in sodium chloride 0 9% 250 mL, 1-15 mg/hr, Intravenous, Titrated, Celeste Izaguirre PA-C, Last Rate: 50 mL/hr at 03/06/21 0846, 5 mg/hr at 03/06/21 0846    norepinephrine (LEVOPHED) 4 mg (STANDARD CONCENTRATION) IV in sodium chloride 0 9% 250 mL, 1-30 mcg/min, Intravenous, Titrated, Nilda Marie PA-C, Stopped at 03/05/21 2108    pantoprazole (PROTONIX) injection 40 mg, 40 mg, Intravenous, Q12H [address], Nilda Marie PA-C, 40 mg at 03/05/21 2222    vancomycin (VANCOCIN) IVPB (premix in dextrose) 750 mg 150 mL, 10 mg/kg (Adjusted), Intravenous, After Dialysis, Nilda Marie PA-C    Facility-Administered Medications Ordered in Other Encounters:     [MAR Hold] LORazepam (ATIVAN) injection 0 5 mg, 0 5 mg, Intravenous, Q6H PRN, Hermilo Bennett MD, 0 5 mg at 03/05/21 0911    Objective   Vitals: Blood pressure 154/70, pulse 80, temperature (!) 97 2 °F (36 2 °C), temperature source Esophageal, resp  rate 20, weight 88 9 kg (195 lb 15 8 oz), SpO2 100 %  , Body mass index is 29 84 kg/m² ,   Orthostatic Blood Pressures      Most Recent Value   Blood Pressure  154/70 filed at 03/06/2021 0200   Patient Position - Orthostatic VS  Lying filed at 03/05/2021 2109            Intake/Output Summary (Last 24 hours) at 3/6/2021 0931  Last data filed at 3/6/2021 0601  Gross per 24 hour   Intake 1880 97 ml   Output 2150 ml   Net -269 03 ml       Invasive Devices     Peripheral Intravenous Line            Peripheral IV 03/05/21 Left;Upper;Medial Arm less than 1 day    Peripheral IV 03/06/21 Left Forearm less than 1 day          Arterial Line            Arterial Line 03/06/21 Radial less than 1 day          Line            Hemodialysis AV Fistula 07/13/20 Right Upper arm 235 days          Drain            NG/OG/Enteral Tube Orogastric 16 Fr Center mouth less than 1 day    Rectal Tube With balloon less than 1 day    Urethral Catheter Temperature probe 16 Fr  less than 1 day          Airway            ETT  7 mm 1 day                    Physical Exam:  GEN: Devere Shoe appears in no acute distress, intubated and sedated  HEENT: pupils equal, round, and reactive to light; extraocular muscles intact  NECK: supple, no carotid bruits  + JVD   HEART: regular rhythm, normal S1 and S2, soft systolic murmur, no clicks, gallops or rubs   LUNGS: clear to auscultation anteriorly, bilaterally; no wheezes, rales, or rhonchi   ABDOMEN: normal bowel sounds, soft, no tenderness, no distention  EXTREMITIES: peripheral pulses diminished; no clubbing, cyanosis  Mild edema  Status post left-sided TMA    NEURO: no focal findings   SKIN: normal without suspicious lesions on exposed skin    Lab Results:   Admission on 03/05/2021   Component Date Value    WBC 03/05/2021 7 57     RBC 03/05/2021 2 05*    Hemoglobin 03/05/2021 7 0*    Hematocrit 03/05/2021 25 2*    MCV 03/05/2021 123*    MCH 03/05/2021 34 1     MCHC 03/05/2021 27 8*    MPV 03/05/2021 12 3     Platelets 22/97/6403 216     nRBC 03/05/2021 0     Protime 03/05/2021 17 6*    INR 03/05/2021 1 43*    PTT 03/05/2021 36     Sodium 03/05/2021 140     Potassium 03/05/2021 5 3     Chloride 03/05/2021 102     CO2 03/05/2021 17*    ANION GAP 03/05/2021 21*    BUN 03/05/2021 31*    Creatinine 03/05/2021 5 43*    Glucose 03/05/2021 473*    Calcium 03/05/2021 8 5     Corrected Calcium 03/05/2021 9 9     AST 03/05/2021 133*    ALT 03/05/2021 64     Alkaline Phosphatase 03/05/2021 250*    Total Protein 03/05/2021 6 1*    Albumin 03/05/2021 2 3*    Total Bilirubin 03/05/2021 0 50     eGFR 03/05/2021 8     Magnesium 03/05/2021 2 5     LACTIC ACID 03/05/2021 13 5*    Troponin I 03/05/2021 0 49*    Color, UA 03/05/2021 Colorless     Clarity, UA 03/05/2021 Slightly Cloudy     Specific Gravity, UA 03/05/2021 1 015     pH, UA 03/05/2021 8 5*    Leukocytes, UA 03/05/2021 Moderate*    Nitrite, UA 03/05/2021 Negative     Protein, UA 03/05/2021 30 (1+)*    Glucose, UA 03/05/2021 Negative     Ketones, UA 03/05/2021 Negative     Urobilinogen, UA 03/05/2021 0 2     Bilirubin, UA 03/05/2021 Negative     Blood, UA 03/05/2021 Moderate*    SARS-CoV-2 03/05/2021 Negative     INFLUENZA A PCR 03/05/2021 Negative     INFLUENZA B PCR 03/05/2021 Negative     RSV PCR 03/05/2021 Negative     Blood Culture 03/05/2021 Received in Microbiology Lab  Culture in Progress   Blood Culture 03/05/2021 Received in Microbiology Lab  Culture in Progress       pH, Arterial 03/05/2021 7 109*    pCO2, Arterial 03/05/2021 46 3*    pO2, Arterial 03/05/2021 179 2*    HCO3, Arterial 03/05/2021 14 3*    Base Excess, Arterial 03/05/2021 -14 2     O2 Content, Arterial 03/05/2021 11 2*    O2 HGB,Arterial  03/05/2021 96 9     SOURCE 03/05/2021 Radial, Left     MARSHAL TEST 03/05/2021 Yes     Vent Type- AC 03/05/2021 AC     AC Rate 03/05/2021 18     Tidal Volume 03/05/2021 450     Inspired Air (FIO2) 03/05/2021 60     PEEP 03/05/2021 6     Ethanol Lvl 58/49/4117 <3     Salicylate Lvl 15/90/4032 <3*    Acetaminophen Level 03/05/2021 <2*    Procalcitonin 03/05/2021 0 29*    RBC, UA 03/05/2021 2-4     WBC, UA 03/05/2021 4-10*    Epithelial Cells 03/05/2021 Occasional     Bacteria, UA 03/05/2021 Occasional     Segmented % 03/05/2021 64     Bands % 03/05/2021 11*    Lymphocytes % 03/05/2021 13*    Monocytes % 03/05/2021 1*    Eosinophils, % 03/05/2021 2     Basophils % 03/05/2021 0     Metamyelocytes% 03/05/2021 8*    Myelocytes % 03/05/2021 1     Absolute Neutrophils 03/05/2021 5 68     Lymphocytes Absolute 03/05/2021 0 98     Monocytes Absolute 03/05/2021 0 08     Eosinophils Absolute 03/05/2021 0 15     Basophils Absolute 03/05/2021 0 00     Total Counted 03/05/2021 100     Platelet Estimate 41/60/2309 Adequate     LACTIC ACID 03/05/2021 4 6*    Troponin I 03/05/2021 13 29*    Magnesium 03/05/2021 2 4     Sodium 03/05/2021 140     Potassium 03/05/2021 5 1     Chloride 03/05/2021 104     CO2 03/05/2021 24     ANION GAP 03/05/2021 12     BUN 03/05/2021 35*    Creatinine 03/05/2021 5 25*    Glucose 03/05/2021 175*    Calcium 03/05/2021 8 9     Corrected Calcium 03/05/2021 10 3*    AST 03/05/2021 519*    ALT 03/05/2021 201*    Alkaline Phosphatase 03/05/2021 311*    Total Protein 03/05/2021 6 6     Albumin 03/05/2021 2 3*    Total Bilirubin 03/05/2021 0 74     eGFR 03/05/2021 9     Hemoglobin 03/05/2021 8 1*    Hematocrit 03/05/2021 27 2*    POC Glucose 03/05/2021 187*    LACTIC ACID 03/06/2021 4 8*    pH, Arterial 03/06/2021 7 285*    pCO2, Arterial 03/06/2021 52 3*    pO2, Arterial 03/06/2021 35 7*    HCO3, Arterial 03/06/2021 24 3     Base Excess, Arterial 03/06/2021 -2 5     O2 Content, Arterial 03/06/2021 7 4*    O2 HGB,Arterial  03/06/2021 56 8*    SOURCE 03/06/2021 Radial, Left     MARSHAL TEST 03/06/2021 Yes     Vent Type- AC 03/06/2021 AC     AC Rate 03/06/2021 22     Tidal Volume 03/06/2021 450     Inspired Air (FIO2) 03/06/2021 40     PEEP 03/06/2021 6     POC Glucose 03/06/2021 243*    PTT 03/06/2021 32     pH, Arterial 03/06/2021 7 471*    pCO2, Arterial 03/06/2021 30 2*    pO2, Arterial 03/06/2021 131 2*    HCO3, Arterial 03/06/2021 21 5*    Base Excess, Arterial 03/06/2021 -1 5     O2 Content, Arterial 03/06/2021 13 1*    O2 HGB,Arterial  03/06/2021 97 9*    SOURCE 03/06/2021 Radial, Left     Temperature 03/06/2021 36 2     Vent Type- AC 03/06/2021 AC     AC Rate 03/06/2021 24     Tidal Volume 03/06/2021 450     Inspired Air (FIO2) 03/06/2021 40     PEEP 03/06/2021 6     Calcium, Ionized 03/06/2021 1 10*    WBC 03/06/2021 7 62     RBC 03/06/2021 2 54*    Hemoglobin 03/06/2021 8 7*    Hematocrit 03/06/2021 28 2*    MCV 03/06/2021 111*    MCH 03/06/2021 34 3     MCHC 03/06/2021 30 9*    RDW 03/06/2021 15 9*    MPV 03/06/2021 11 7     Platelets 82/72/4589 209     nRBC 03/06/2021 0     Sodium 03/06/2021 141     Potassium 03/06/2021 4 8     Chloride 03/06/2021 104     CO2 03/06/2021 20*    ANION GAP 03/06/2021 17*    BUN 03/06/2021 40*    Creatinine 03/06/2021 5 48*    Glucose 03/06/2021 171*    Calcium 03/06/2021 8 8     Corrected Calcium 03/06/2021 10 2*    AST 03/06/2021 354*    ALT 03/06/2021 181*    Alkaline Phosphatase 03/06/2021 239*    Total Protein 03/06/2021 6 3*    Albumin 03/06/2021 2 2*    Total Bilirubin 03/06/2021 0 69     eGFR 03/06/2021 8     LACTIC ACID 03/06/2021 6 4*    Magnesium 03/06/2021 2 3     Phosphorus 03/06/2021 3 1     Protime 03/06/2021 17 5*    INR 03/06/2021 1 43*    Troponin I 03/06/2021 >40 00*    POC Glucose 03/06/2021 156*    POC Glucose 03/06/2021 173*    POC Glucose 03/06/2021 179*     Labs & Results:    Results from last 7 days   Lab Units 03/06/21  0406 03/05/21 2242 03/05/21 1820   TROPONIN I ng/mL >40 00* 13 29* 0 49*     Results from last 7 days   Lab Units 03/06/21  0406 03/05/21 2242 03/05/21 1820 03/04/21  0714   WBC Thousand/uL 7 62  --  7 57 9 37   HEMOGLOBIN g/dL 8 7* 8 1* 7 0* 7 2*   HEMATOCRIT % 28 2* 27 2* 25 2* 24 2*   PLATELETS Thousands/uL 209  --  216 219         Results from last 7 days   Lab Units 03/06/21 0406 03/05/21 2242 03/05/21  1820   POTASSIUM mmol/L 4 8 5 1 5 3   CHLORIDE mmol/L 104 104 102   CO2 mmol/L 20* 24 17*   BUN mg/dL 40* 35* 31*   CREATININE mg/dL 5 48* 5 25* 5 43*   CALCIUM mg/dL 8 8 8 9 8 5   ALK PHOS U/L 239* 311* 250*   ALT U/L 181* 201* 64   AST U/L 354* 519* 133*     Results from last 7 days   Lab Units 03/06/21  0406 03/05/21  1820   INR  1 43* 1 43*   PTT seconds 32 36     Results from last 7 days   Lab Units 03/06/21  0406 03/05/21  2242 03/05/21  1820   MAGNESIUM mg/dL 2 3 2 4 2 5         Imaging: I have personally reviewed pertinent films in PACS  Xr Chest 1 View Portable    Result Date: 3/6/2021  Narrative: CHEST INDICATION:   post intubation  COMPARISON:  5/14/2020 EXAM PERFORMED/VIEWS:  XR CHEST PORTABLE FINDINGS:  Endotracheal tube is noted 7 cm above the ayan the level of clavicles  Nasogastric tube is present below the diaphragm  The cardiac silhouette is enlarged  Mild coronary vascular congestion is seen worse than the prior study  There is no pneumothorax on this semierect study  The costophrenic angles are clear  No focal infiltrate is identified  Osseous structures appear within normal limits for patient age  Impression: Status post intubation with mild pulmonary vascular congestion  Workstation performed: [id number]     Ct Head Without Contrast    Result Date: 3/5/2021  Narrative: CT BRAIN - WITHOUT CONTRAST INDICATION:   Altered mental status Unresponsive  COMPARISON:  CT head 9/16/2020  TECHNIQUE:  CT examination of the brain was performed  In addition to axial images, sagittal and coronal 2D reformatted images were created and submitted for interpretation  Radiation dose length product (DLP) for this visit:  0987 mGy-cm   This examination, like all CT scans performed in the [facility], was performed utilizing techniques to minimize radiation dose exposure, including the use of iterative reconstruction and automated exposure control    IMAGE QUALITY:  Motion limited FINDINGS: PARENCHYMA:  No intracranial mass, mass effect or midline shift  No CT signs of acute infarction  No acute parenchymal hemorrhage  VENTRICLES AND EXTRA-AXIAL SPACES:  Normal for the patient's age  VISUALIZED ORBITS AND PARANASAL SINUSES:  Unremarkable  CALVARIUM AND EXTRACRANIAL SOFT TISSUES:  Fluid in the partially imaged paranasal sinuses in keeping with acute pansinusitis  Impression: Motion limited examination  No acute brain parenchymal findings  Partially imaged acute sinusitis  The study was marked in [facility] for immediate notification  Workstation performed: [id number]       EKG: Initial ECG showed sinus tachycardia, left axis deviation and LVH  Repeat ECG showed normal sinus rhythm with LVH and secondary repolarization abnormalities  No significant arrhythmias seen on telemetry review  Sinus rhythm    Counseling / Coordination of Care  Total floor / unit time spent today 40 minutes  Greater than 50% of total time was spent with the patient and / or family counseling and / or coordination of care

## 2021-03-06 NOTE — PROGRESS NOTES
If TF is deemed warranted:    Recommend initiating TF- Jevity 1 5 @ 60ml/h, intiated at 10ml/h, advancing as tolerated to goal  Flush per primary team/nephrology  If pt renal status declines, or renal specialty formula is required, Nepro @50ml/h, initiated at 10ml/h, advancing as tolerated to goal rate, flush per primary team/nephrology  Jevity 1 5 provides: 2160 kcal, 91g protein, 71g fat, 310g CHO, 1064ml free fluid, 3139mg K, 1800mg P       Nepro will provide: 2160 kcal, 97g protein, 115g fat, 192g CHO, 876ml free fluid, 1138mg K, 860mg P

## 2021-03-06 NOTE — PROGRESS NOTES
Late fellow note:    I discussed patient with critical care team overnight at time of admission  In short, patient is a 62-year-old female with history of CKD status post kidney/ pancreas transplant currently on dialysis Tuesday/Thursday / Saturday, diabetes, multiple myeloma on treatment, diastolic CHF, presented after cardiac arrest   Patient apparently called EMS herself due to worsening shortness of breath  Upon arrival, EMS found patient to be unresponsive and pulseless and started CPR  Down time was approximately 15 minutes, PEA arrest   Upon arrival, patient had extremely poor neurologic exam with absent cough/gag and abnormal pupillary reflexes  Chest x-ray showed mild vascular congestion, but was mostly clear  Labs were pertinent for negative salicylate/acetaminophen levels, creatinine 5 4 ( hemodialysis patient),  Anion gap metabolic acidosis with lactic acidosis and pH 7 11  Patient received bicarb, IV fluids, broad-spectrum antibiotics, and was admitted to the   ICU  Echo was ordered and troponin was   Trended  This morning, troponin was greater than 40 and patient was initiated on heparin infusion cardiology was consulted  CTA chest /abd/pelvis this am was negative for PE  Her initial head CT did not show any evidence of anoxic brain injury, however repeat CT head this morning, showed significant edema and evidence of anoxic injury  Given this, patient was subsequently transitioned to comfort care per critical care team   Further details of admission as outlined in AP note  I discussed this case with Dr Darling Mccord

## 2021-03-06 NOTE — PROGRESS NOTES
Vancomycin Assessment    Olla Mcardle Ian Kirsch is a 63 y o  female who is currently receiving vancomycin vancomycin 1250mg q12h for Pneumonia     Relevant clinical data and objective history reviewed:  Creatinine   Date Value Ref Range Status   03/06/2021 5 48 (H) 0 60 - 1 30 mg/dL Final     Comment:     Standardized to IDMS reference method   03/05/2021 5 25 (H) 0 60 - 1 30 mg/dL Final     Comment:     Standardized to IDMS reference method   03/05/2021 5 43 (H) 0 60 - 1 30 mg/dL Final     Comment:     Standardized to IDMS reference method   01/06/2016 1 72 (H) 0 60 - 1 30 mg/dL Final     Comment:     Standardized to IDMS reference method   12/14/2015 1 43 (H) 0 60 - 1 30 mg/dL Final     Comment:     Standardized to IDMS reference method   12/07/2015 1 50 (H) 0 60 - 1 30 mg/dL Final     Comment:     Standardized to IDMS reference method     /70   Pulse 76   Temp 97 7 °F (36 5 °C) (Esophageal)   Resp (!) 24   Wt 88 9 kg (195 lb 15 8 oz)   LMP  (LMP Unknown)   SpO2 100%   BMI 29 84 kg/m²   No intake/output data recorded  Lab Results   Component Value Date/Time    BUN 40 (H) 03/06/2021 04:06 AM    BUN 31 (H) 01/06/2016 06:32 AM    WBC 7 62 03/06/2021 04:06 AM    WBC 11 65 (H) 12/14/2015 01:23 PM    HGB 8 7 (L) 03/06/2021 04:06 AM    HGB 13 6 12/14/2015 01:23 PM    HCT 28 2 (L) 03/06/2021 04:06 AM    HCT 40 8 12/14/2015 01:23 PM     (H) 03/06/2021 04:06 AM    MCV 93 12/14/2015 01:23 PM     03/06/2021 04:06 AM     12/14/2015 01:23 PM     Temp Readings from Last 3 Encounters:   03/06/21 97 7 °F (36 5 °C) (Esophageal)   03/03/21 (!) 96 5 °F (35 8 °C) (Tympanic)   02/17/21 99 2 °F (37 3 °C) (Tympanic)     Vancomycin Days of Therapy:1    Assessment/Plan  The patient is currently on vancomycin utilizing pulse dosing based on adjusted body weight (due to obesity)  Baseline risks associated with therapy include: pre-existing renal impairment and concomitant nephrotoxic medications    The patient received vancomycin 1500mg x1; and was ordered vancomycin 1250mg q12h, after clinical evaluation will be changed to vancomycin 750mg post-HD  Pharmacy will also follow closely for s/sx of nephrotoxicity, infusion reactions, and appropriateness of therapy  BMP and CBC will be ordered per protocol  Plan for trough pre-HD prior to third dose  Due to infection severity, will target a trough of     Pharmacy will continue to follow the patients culture results and clinical progress daily      Cheri Montanez, Pharmacist

## 2021-03-06 NOTE — DEATH NOTE
INPATIENT DEATH NOTE  Jaye Stark 63 y o  female MRN: [id number]  Unit/Bed#: ICU 06 Encounter: [id number]    Date, Time and Cause of Death    Date of Death: 3/6/21  Time of Death:  3:11 PM  Preliminary Cause of Death: Anoxic brain injury ([facility] [address] )  Entered by: Eleonora Ibarra PA-C[BM1 1]     Attribution     BM1 1 Ramila Miller PA-C 21 15:17           Patient's Information  Pronounced by: Eleonora Ibarra PA-C  Did the patient's death occur in the ED?: No  Did the patient's death occur in the OR?: No  Did the patient's death occur less than 10 days post-op?: No  Did the patient's death occur within 24 hours of admission?: Yes  Was code status DNR at the time of death?: Yes    PHYSICAL EXAM:  Unresponsive to noxious stimuli, Spontaneous respirations absent, Breath sounds absent, Carotid pulse absent, Heart sounds absent, Pupillary light reflex absent and Corneal blink reflex absent    Medical Examiner notification criteria:  Patient  within 24 hours of arrival to hospital   Medical Examiner's office notified?:  Yes   Medical Examiner accepted case?:  Pending Nursing notification    Family Notification  Was the family notified?: Yes  Date Notified: 21  Time Notified: 7323  Notified by: Anitha Francisco  Name of Family Notified of Death: Rivka Mcclendon   Relationship to Patient: Parent  Family Notification Route:  At bedside  Was the family told to contact a  home?: Yes  Name of [place] [id number][de-identified] [address] [facility]    Autopsy Options:  Post-mortem examination declined by next of kin    Primary Service Attending Physician notified?:  yes - Attending:  Rigoberto Cooney, DO    Physician/Resident responsible for completing Discharge Summary:  MER Francisco

## 2021-03-06 NOTE — PROGRESS NOTES
Daily Progress Note - Critical Care   Sai Dorsey 63 y o  female MRN: [id number]  Unit/Bed#: ICU 06 Encounter: [id number]        ----------------------------------------------------------------------------------------  HPI/24hr events: Sai Dorsey is a 63 y o  female who presents with PMH of CKD s/p kidney and pancreas transplant with current Tu,Th, Sat dialysis, Type 1 diabetes mellitus, multiple myeloma, CHF, aortic insufficiency, and Kaiser's esophagus status post cardiac arrest  Patient called EMS herself due to worsening shortness of breath  Upon EMS arrival, patient with agonal respirations and eventual respiratory arrest  This lead to concomitant cardiac arrest with PEA  CRP initiated with several doses of epi administered  Total down time reported as 15 minutes with eventual ROSC  Patient admitted to ICU for TTM achieved at 0000  Patient remains intubated on PCVC 24/450/60/6  Remains off sedation but received one dose of fentanyl 50mcg overnight due to tube biting  Patient also hypertensive with SBP reaching 190s treated with 10mg IV hydralazine      ---------------------------------------------------------------------------------------  SUBJECTIVE  Unable to offer verbal complaints  Review of Systems  Review of systems was reviewed and negative unless stated above in HPI/24-hour events   ---------------------------------------------------------------------------------------  Assessment and Plan:    Neuro:   · Diagnosis: Toxic metabolic encephalopathy   ? Plan:   § Concern for anoxic brain injury in the setting of cardiac arrest   § CT head 3/5: Motion limited examination  No acute brain parenchymal findings  Partially imaged acute sinusitis  § Correct metabolic acidosis   § ABG on admission 7 10/46  3/179 2/14 3 Base excess -14 2  § Frequent neuro exam   § TTM initiated   § Control shivering with buspar   · Diagnosis: Anxiety/Depression  ?  Plan:   § Outpatient regimen: Abilify, buspar, cymbalta, atmaddi zoloft  § Hold current outpatient regimen in the setting of acute illness       CV:   · Diagnosis: Cardiac arrest   ? Plan:   § S/p out of hospital cardiac arrest like from respiratory source with PEA noted  ROSC obtained after approximately 15 minutes of CPR with multiple doses of epi administered  § Obtain current echo   § TTM   · Diagnosis: Chronic CHF with G1DD  ? Plan:   § Echo 12/26/2019: EF 65% with G1DD  §  Obtain current echo   § Outpatient regimen includes: aspirin, metoprolol tartrate 50mg, toresemide  § Hold home regimen   · Diagnosis: Aortic insufficiency   ? Plan:   Radha Model with outpatient cardiology  · Diagnosis: Hyperlipidemia   ? Plan:   § Outpatient regimen: pravastatin  · Diagnosis: Hypertension   ? Plan:   § Continue PRN hydralazine for SBP >160  § Restart metoprolol       Pulm:  · Diagnosis: Acute hypercarbic respiratory failure   ? Plan:   § Emergent intubation 3/5  § Continue mechanical ventilation day 1 24/450/60/6  § Maintain VAP bundle   § IDW 63 8  § CXR 3/5: ETT high, does not appear to be fluid overloaded      GI:   · Diagnosis: Kaiser's esophagus   ? Plan:   § EGD 9/19/2020: LA class D esophagitis  Multiple esophageal ulcers   Biopsies taken to rule out underlying infectious causes especially in the setting of immunocompromise  Moderate gastritis with dusky appearance of the antrum of the stomach concerning for ischemia  Multiple large, crated, duodenal ulcers with black eschars in the 1st and 2nd part duodenum concerning for ischemia   No ulcerations noted in the 3rd part of the duodenum  § Continue Protonix 40mg IV BID   § Continue OG tube   § Avoid NSAIDs  · Diagnosis: S/p pancreatic transplant   ? Plan:   § Transplant in 1998 at [address] with simultaneous kidney transplant   § Pancreas transplant failure in 2017   · Diagnosis: Bright red vomitus  ?  Plan:   § Closely monitor OG output   § Monitor hemoglobin   § Possible GI consult   § Continue prtoonix 40 IV BID   · Diagnosis: Diarrhea  ? Plan:   § Rectal tube inserted   § Two large liquid brown bowel movements   § C diff pending       :   · Diagnosis: ESRD    ? Plan:   § Outpatient dialysis regimen of Tuesday, Thursday, Friday dialysis   § Last treatment 3/5  § Baseline Cr: 3 34-5 60  § Creatinine on admission 5 43  · Diagnosis: S/p kidney transplant   ? Plan:   § Transplant done in 1998 at [address] with simultaneous pancreas transplant    § Now with allograft failure on dialysis since May 2020   § Not a current transplant candidate due to MM with need to have less than 5% BM activity before consideration   § Remains on prednisone 5mg and tacrolimbus, which is being weaned as outpatient immunosuppression   § S/p IR procedure 9/2018 and transplanted kidney artery open      F/E/N:    Plan:   ? Fluids: S/p 1 L isolyte bolus, bicarb x 2, on 75cc/hr isolyte for 5 hrs total   ? Electrolytes: Will replete as necessary to maintain potassium > 4 0, magnesium > 2 0, and phosphrous > 3 0    ? Nutrition: NPO, consider TF start       Heme/Onc:   · Diagnosis: IgG Kappa multiple myeloma stage III  ? Plan:   § Diagnosed 4/2019 which is a transition from smoldering multiple myeloma diagnosed 9/2017   § Outpatient regimen includes daratumumab every Wednesday   § Last dose 3/5   · Diagnosis: Chronic anemia   ? Plan:   § S/p 1 UPRBC prior to dialysis   § Continue to monitor blood counts   § Transfuse as needed for Hgb <7       Endo:   · Diagnosis: Type 1 Diabetes Mellitus   ? Plan:   § Start insulin gtt  § Frequent glucose checks   § Hemoglobin A1C 1/22: 4 5   · Diagnosis: Hypothyroid   ? Plan:   § Continue outpatient levothyroxine   § TSH 3/4: 2 93      ID:   · Diagnosis: Possible aspiration pneumonia   ? Plan:   § Continue broad spectrum antibiotics cefepime/vanc/flagyl Day 1  § Covid negative 3/5  § Follow up blood cultures   § MRSA pending       MSK/Skin:   · Diagnosis: Diabetic neuropathy grade 3  ?  Plan: § Uses a cane at baseline for ambulation   § S/p L transmetatarsal amputation and R hallex amputation    · Diagnosis: AVF placement   ? Plan:   § S/p placement 2020 R arm       Disposition: Continue Critical Care   Code Status: Level 2 - DNAR: but accepts endotracheal intubation  ---------------------------------------------------------------------------------------  ICU CORE MEASURES    Prophylaxis   VTE Pharmacologic Prophylaxis: Heparin  VTE Mechanical Prophylaxis: sequential compression device  Stress Ulcer Prophylaxis: Pantoprazole IV     ABCDE Protocol (if indicated)  Plan to perform spontaneous awakening trial today? Yes  Plan to perform spontaneous breathing trial today? No  Obvious barriers to extubation? Yes  CAM-ICU: Positive    Invasive Devices Review  Invasive Devices     Peripheral Intravenous Line            Peripheral IV 21 Left Antecubital less than 1 day    Peripheral IV 21 Left Arm less than 1 day    Peripheral IV 21 Left Arm less than 1 day          Line            Hemodialysis AV Fistula 20 Right Upper arm 235 days          Drain            NG/OG/Enteral Tube Orogastric 16 Fr Center mouth less than 1 day    Rectal Tube With balloon less than 1 day    Urethral Catheter Temperature probe 16 Fr  less than 1 day          Airway            ETT  7 mm 1 day              Can any invasive devices be discontinued today?  No  ---------------------------------------------------------------------------------------  OBJECTIVE    Vitals   Vitals:    21 0100 21 0105 21 0121 21 0200   BP: (!) 181/77 143/65 150/69    Pulse: 73 73 75    Resp: 22 22 (!) 24    Temp: (!) 97 2 °F (36 2 °C) (!) 97 2 °F (36 2 °C) (!) 97 2 °F (36 2 °C) (!) 97 2 °F (36 2 °C)   TempSrc: Esophageal   Esophageal   SpO2: 100% 100% 100%    Weight:         Temp (24hrs), Av 4 °F (36 3 °C), Min:96 1 °F (35 6 °C), Max:98 1 °F (36 7 °C)  Current: Temperature: (!) 97 2 °F (36 2 °C)      Respiratory:  SpO2: SpO2: 100 %, SpO2 Activity: SpO2 Activity: At Rest, SpO2 Device: O2 Device: Ventilator       Invasive/non-invasive ventilation settings   Respiratory    Lab Data (Last 4 hours)      03/06 0028            pH, Arterial       7 285           pCO2, Arterial       52 3           pO2, Arterial       35 7           HCO3, Arterial       24 3           Base Excess, Arterial       -2 5                O2/Vent Data     None                Physical Exam  Constitutional:       General: She is not in acute distress  Appearance: She is obese  She is ill-appearing  She is not diaphoretic  HENT:      Head: Normocephalic and atraumatic  Nose: Nose normal       Mouth/Throat:      Comments: Dried red and bright red blood per mouth; ETT/OG  Eyes:      General: No scleral icterus  Conjunctiva/sclera: Conjunctivae normal       Comments: L>R pupil    Neck:      Musculoskeletal: Normal range of motion and neck supple  Cardiovascular:      Rate and Rhythm: Normal rate and regular rhythm  Pulmonary:      Effort: Pulmonary effort is normal       Comments: Course breath sounds bilaterally   Abdominal:      General: Bowel sounds are normal  There is no distension  Palpations: Abdomen is soft  Tenderness: There is no abdominal tenderness  Comments: Previous surgical incisions clean/dry/ well healed    Genitourinary:     Comments: Rectal tube  Musculoskeletal:      Right lower leg: No edema  Left lower leg: No edema  Comments: L sided transmetatarsal amputation and R hallex amputation with surgical incisions clean, dry, well healed    Skin:     General: Skin is warm and dry  Capillary Refill: Capillary refill takes less than 2 seconds  Coloration: Skin is not jaundiced  Neurological:      GCS: GCS eye subscore is 1  GCS verbal subscore is 1  GCS motor subscore is 1        Comments:           Laboratory and Diagnostics:  Results from last 7 days   Lab Units 03/05/21 2242 03/05/21 1820 03/04/21  0714   WBC Thousand/uL  --  7 57 9 37   HEMOGLOBIN g/dL 8 1* 7 0* 7 2*   HEMATOCRIT % 27 2* 25 2* 24 2*   PLATELETS Thousands/uL  --  216 219   NEUTROS PCT %  --   --  66   BANDS PCT %  --  11*  --    MONOS PCT %  --   --  6   MONO PCT %  --  1*  --      Results from last 7 days   Lab Units 03/05/21 2242 03/05/21  1820 03/04/21  0714   SODIUM mmol/L 140 140 139   POTASSIUM mmol/L 5 1 5 3 4 7   CHLORIDE mmol/L 104 102 101   CO2 mmol/L 24 17* 29   ANION GAP mmol/L 12 21* 9   BUN mg/dL 35* 31* 35*   CREATININE mg/dL 5 25* 5 43* 6 47*   CALCIUM mg/dL 8 9 8 5 9 0   GLUCOSE RANDOM mg/dL 175* 473*  --    ALT U/L 201* 64 14   AST U/L 519* 133* 17   ALK PHOS U/L 311* 250* 168*   ALBUMIN g/dL 2 3* 2 3* 2 9*   TOTAL BILIRUBIN mg/dL 0 74 0 50 0 65     Results from last 7 days   Lab Units 03/05/21 2242 03/05/21  1820   MAGNESIUM mg/dL 2 4 2 5      Results from last 7 days   Lab Units 03/05/21  1820   INR  1 43*   PTT seconds 36      Results from last 7 days   Lab Units 03/05/21 2242 03/05/21  1820   TROPONIN I ng/mL 13 29* 0 49*     Results from last 7 days   Lab Units 03/05/21 2242 03/05/21  1820   LACTIC ACID mmol/L 4 6* 13 5*     ABG:  Results from last 7 days   Lab Units 03/06/21  0028   PH ART  7 285*   PCO2 ART mm Hg 52 3*   PO2 ART mm Hg 35 7*   HCO3 ART mmol/L 24 3   BASE EXC ART mmol/L -2 5   ABG SOURCE  Radial, Left     VBG:  Results from last 7 days   Lab Units 03/06/21  0028   ABG SOURCE  Radial, Left     Results from last 7 days   Lab Units 03/05/21  1820   PROCALCITONIN ng/ml 0 29*       Micro  Results from last 7 days   Lab Units 03/05/21  1826 03/05/21  1820   BLOOD CULTURE  Received in Microbiology Lab  Culture in Progress  Received in Microbiology Lab  Culture in Progress  EKG: Reviewed  Sinus tachycardia at a rate of 130 beats per minute   Left axis deviation   Intraventricular conduction delay   Nonspecific ST T wave abnormalities   Rate change from previous EKG from 09/16/2020  Imaging:  I have personally reviewed pertinent films in PACS    Intake and Output  I/O       03/04 0701 - 03/05 0700 03/05 0701 - 03/06 0700    I V  (mL/kg)  1065 6 (12)    NG/GT  55    IV Piggyback  360    Total Intake(mL/kg)  1480 6 (16 7)    Urine (mL/kg/hr)  0    Emesis/NG output  50    Stool  1600    Total Output  1650    Net  -169 4                  Height and Weights      IBW: -92 5 kg  Body mass index is 29 84 kg/m²  Weight (last 2 days)     Date/Time   Weight    03/05/21 2128   88 9 (195 99)                Nutrition       Diet Orders   (From admission, onward)             Start     Ordered    03/05/21 2136  Room Service  Once     Question:  Type of Service  Answer:  Room Service- Not Appropriate    03/05/21 2135 03/05/21 1914  Diet NPO  Diet effective now     Question Answer Comment   Diet Type NPO    RD to adjust diet per protocol?  No        03/05/21 1921                  Active Medications  Scheduled Meds:  Current Facility-Administered Medications   Medication Dose Route Frequency Provider Last Rate    acetaminophen  975 mg Oral Q6H Zenovia Peggy, PA-C      busPIRone  15 mg Oral [id number] [place], [place]      cefepime  1,000 mg Intravenous Q12H Zenovia Peggy, PA-C      chlorhexidine  15 mL Swish & Spit Q12H [facility] [place], [place]      heparin (porcine)  5,000 Units Subcutaneous [place], [place]      insulin regular (HumuLIN R,NovoLIN R) infusion  0 3-21 Units/hr Intravenous Titrated Carlinovia Peggy, PA-C 4 Units/hr (03/06/21 0158)    metroNIDAZOLE  500 mg Intravenous Q8H Zenovia Peggy, PA-C Stopped (03/06/21 0130)    norepinephrine  1-30 mcg/min Intravenous Titrated Zenovia Peggy, PA-C Stopped (03/05/21 2108)    pantoprazole  40 mg Intravenous Q12H [id number] [place], [place]      vancomycin  15 mg/kg Intravenous Q12H Kristia Peggy, PA-C       Facility-Administered Medications Ordered in Other Encounters   Medication Dose Route Frequency Provider Last Rate    [MAR Hold] LORazepam  0 5 mg Intravenous Q6H PRN Fannie Gamez MD       Continuous Infusions:  insulin regular (HumuLIN R,NovoLIN R) infusion, 0 3-21 Units/hr, Last Rate: 4 Units/hr (03/06/21 0158)  norepinephrine, 1-30 mcg/min, Last Rate: Stopped (03/05/21 2108)      PRN Meds:        Allergies   Allergies   Allergen Reactions    Penicillins Hives and Shortness Of Breath     However, has tolerated Cephalexin and Zosyn which are similar side chain  Has also tolerated Cephs with different side chains such as: Ceftriaxone, Cefepime, Cefdinir, Cefpodoxime    Ixazomib Rash     Ninlaro    Revlimid [Lenalidomide] Rash    Cefadroxil Other (See Comments)     Unlikely allergy, as patient has tolerated Cephalexin which looks almost the same  Has also tolerated other Cephs with different side chains such as: Ceftriaxone, Cefepime, Cefdinir, Cefpodoxime   Morphine Other (See Comments)     Other reaction(s):  Other (See Comments)  projectile vomiting    Morphine And Related GI Intolerance    Myrbetriq [Mirabegron] Hives     ---------------------------------------------------------------------------------------  Advance Directive and Living Will:      Power of :    POLST:    ---------------------------------------------------------------------------------------  Care Time Delivered:   No Critical Care time spent     Eugenia Blackburn PA-C

## 2021-03-06 NOTE — DISCHARGE SUMMARY
Discharge Summary - Jo Villanueva 63 y o  female MRN: [id number]    Unit/Bed#: ICU 06 Encounter: [id number] PCP: Lindsey Godoy MD    Admission Date:   Admission Orders (From admission, onward)     Ordered        03/05/21 1849  Inpatient Admission  Once                     Admitting Diagnosis: Cardiac arrest [facility]) [I46 9]  Cardiopulmonary arrest ([facility] [address] ) [I46 9]  Low bicarbonate [E87 8]  Status post kidney transplant [Z94 0]  Chronic kidney disease-mineral and bone disorder [N18 9, E83 9, M89 9]    HPI: Jo Villanueva is a 63 y o  female who presents with PMH of CKD s/p kidney and pancreas transplant with current Tu,Th, Sat dialysis, Type 1 diabetes mellitus, multiple myeloma on current chemo last dose 3/5, CHF with EF 65% and G1DD, aortic insufficiency, and coreas's esophagus status post cardiac orted as 15 minutes with eventual ROSC  Patient intubated in the field per EMS  arrest  Patient called EMS herself due to worsening shortness of breath  Upon EMS arrival, patient with agonal respirations and eventual respiratory arrest  This lead to concomitant cardiac arrest with PEA  CPR initiated with several doses of epi administered  Total down time reported around 15 minutes  She was intubated in the field  Procedures Performed:   Orders Placed This Encounter   Procedures   [address] ED ECG Documentation Only       Summary of Hospital Course: The patient was admitted to the ICU, cooled using TTM to 36 degrees  Despite not being on sedation, the patient did not wake, or follow commands  She was overbreathing the vent but otherwise not responsive to painful stimuli  She was initiated on cardene infusion for HTn with SBP>200  She was also started on heparin and evaluated by cardiology for trop>40  This AM, she developed upward gaze on exam, as well as extension to painful stimuli in LUE  Repeat head CT showed evidence of anoxia with cerebral edema more pronounced in the cerebellum   CTA with evidence of renal transplant infarct  Per discussion with family, patient would not want to be on life support or have any further life sustaining measures  Decision was made to withdraw care  The patient passed away peacefully at [phone] surrounded by her family  Significant Findings, Care, Treatment and Services Provided:     3/5 intubation   3/5 BC - pending   3/6 oncology evaluation - arrest not thought to be secondary to daratumumab  3/6 cardiology evaluation - troponin elevation likely type 2 MI     3/6:   CTA chest ct abdomen pelvis w contrast   Final Result      Limited study with no gross evidence of pulmonary embolism  The endotracheal tube is somewhat low and could be pulled back 1- 2 cm  Diffusely mildly distended loops of bowel may be related to ileus  Swollen left renal transplant with poor enhancement of the transplant artery suggests transplant artery occlusion and infarct  This was discussed with MER Valentin at 1:00 PM               Workstation performed: [id number]         CT head wo contrast   Final Result      1  Evidence of anoxic brain injury  Gyral swelling/edema is more pronounced in the cerebellum with effacement of cerebellar cisterns and 4th ventricle  Slightly increased size of lateral ventricles concerning for early developing hydrocephalus  2   Acute pansinusitis  I personally discussed this study with Tom Renner on 3/6/2021 at 12:54 PM                Workstation performed: [id number]            3/5:   CT head without contrast   Final Result      Motion limited examination  No acute brain parenchymal findings  Partially imaged acute sinusitis  The study was marked in [facility] for immediate notification  Workstation performed: [id number]         XR chest 1 view portable   Final Result      Status post intubation with mild pulmonary vascular congestion                    Workstation performed: [id number] 3/6 goals of care discussion with family, they would like to discontinue life support given anoxic injury  Disposition:      Final Diagnosis: anoxic brain injury     Resolved Problems  Date Reviewed: 2021    None              Date, Time and Cause of Death    Date of Death: 3/6/21  Time of Death:  3:11 PM  Preliminary Cause of Death: Anoxic brain injury ([facility] )  Entered by: Chucho Lerma PA-C[BM1 1]     Attribution     BM1 1 Braxton Jacob PA-C 21 15:17          Death Note:    INPATIENT DEATH NOTE  Clare Obando 63 y o  female MRN: [id number]  Unit/Bed#: ICU 06 Encounter: [id number]    Date, Time and Cause of Death    Date of Death: 3/6/21  Time of Death:  3:11 PM  Preliminary Cause of Death: Anoxic brain injury ([facility] )  Entered by: Chucho Lerma PA-C[BM1 1]     Attribution     BM1 1 Braxton Jacob PA-C 21 15:17           Patient's Information  Pronounced by: Chucho Lerma PA-C  Did the patient's death occur in the ED?: No  Did the patient's death occur in the OR?: No  Did the patient's death occur less than 10 days post-op?: No  Did the patient's death occur within 24 hours of admission?: Yes  Was code status DNR at the time of death?: Yes    PHYSICAL EXAM:  Unresponsive to noxious stimuli, Spontaneous respirations absent, Breath sounds absent, Carotid pulse absent, Heart sounds absent, Pupillary light reflex absent and Corneal blink reflex absent    Medical Examiner notification criteria:  Patient  within 24 hours of arrival to hospital   Medical Examiner's office notified?:  Yes   Medical Examiner accepted case?:  Pending Nursing notification    Family Notification  Was the family notified?: Yes  Date Notified: 21  Time Notified: 5193  Notified by: Jackie Mckay, [address]  Name of Family Notified of Death: Rivka Mcclendon   Relationship to Patient: Parent  Family Notification Route:  At bedside  Was the family told to contact a  home?: Yes  Name of  Home[de-identified] [id number]    Autopsy Options:  Post-mortem examination declined by next of kin    Primary Service Attending Physician notified?:  yes - Attending:  Yordan Manrique DO    Physician/Resident responsible for completing Discharge Summary:  MER Castillo

## 2021-03-06 NOTE — H&P
H&P Exam - [address] 63 y o  female MRN: [id number]  Unit/Bed#: ED 16 Encounter: [id number]      -------------------------------------------------------------------------------------------------------------  Chief Complaint: Cardiac arrest     History of Present Illness   HX and PE limited by: Intubated  Zachary Dumont is a 63 y o  female who presents with PMH of CKD s/p kidney and pancreas transplant with current Tu,Th, Sat dialysis, Type 1 diabetes mellitus, multiple myeloma, CHF with EF 65% and G1DD, aortic insufficiency, and coreas's esophagus status post cardiac arrest  Patient called EMS herself due to worsening shortness of breath  Upon EMS arrival, patient with agonal respirations and eventual respiratory arrest  This lead to concomitant cardiac arrest with PEA  CRP initiated with several doses of epi administered  Total down time reported as 15 minutes with eventual ROSC  Patient intubated in the field per EMS  To note patient received SQ Darzalex this AM for multiple myeloma outpatient treatment as well as 1 U PRBC prior to dialysis  She also had a repeat EGD/Colonscopy 3/3/2021  History obtained from mother and chart review  -------------------------------------------------------------------------------------------------------------  Assessment and Plan:    Neuro:    Diagnosis: Toxic metabolic encephalopathy   o Plan:   - Concern for anoxic brain injury in the setting of cardiac arrest   - CT head 3/5: Motion limited examination  No acute brain parenchymal findings  Partially imaged acute sinusitis  - Correct metabolic acidosis   - ABG on admission 7 10/46  3/179 2/14 3 Base excess -14 2  - Frequent neuro exam   - TTM initiated   - Control shivering with buspar    Diagnosis:  Anxiety/Depression  o Plan:   - Outpatient regimen: Abilify, buspar, cymbalta, ativan, zoloft  - Hold current outpatient regimen in the setting of acute illness     CV:    Diagnosis: Cardiac arrest o Plan:   - S/p out of hospital cardiac arrest like from respiratory source with PEA noted  ROSC obtained after approximately 15 minutes of CPR with multiple doses of epi administered  - Obtain current echo   - TTM    Diagnosis: Chronic CHF with G1DD  o Plan:    Echo 12/26/2019: EF 65% with G1DD    Obtain current echo    Outpatient regimen includes: aspirin, metoprolol tartrate 50mg, toresemide   Hold home regimen    Diagnosis: Aortic insufficiency   o Plan:   - Follows with outpatient cardiology   Diagnosis: Hyperlipidemia   o Plan:   - Outpatient regimen: pravastatin      Pulm:   Diagnosis: Acute hypercarbic respiratory failure   o Plan:   - Emergent intubation 3/5  - Continue mechanical ventilation day 1 22/450/60/6  - Maintain VAP bundle   - IDW 63 8  - CXR 3/5: ETT high, does not appear to be fluid overloaded       GI:    Diagnosis: Kaiser's esophagus   o Plan:   - EGD 9/19/2020: LA class D esophagitis  Multiple esophageal ulcers  Biopsies taken to rule out underlying infectious causes especially in the setting of immunocompromise  Moderate gastritis with dusky appearance of the antrum of the stomach concerning for ischemia  Multiple large, crated, duodenal ulcers with black eschars in the 1st and 2nd part duodenum concerning for ischemia    No ulcerations noted in the 3rd part of the duodenum   - Continue Protonix 40mg IV BID   - Continue NG tube   - Avoid NSAIDs   Diagnosis: S/p pancreatic transplant   o Plan:   - Transplant in 1998 at [address] with simultaneous kidney transplant   - Pancreas transplant failure in 2017    Diagnosis: Bright red vomitus  o Plan:   - Maintain OG tube   - Closely monitor output   - Monitor hemoglobin   - Possible GI consult   - Continue prtoonix 40 IV BID       :    Diagnosis: ESRD    o Plan:   - Outpatient dialysis regimen of Tuesday, Thursday, Friday dialysis   - Last treatment 3/5  - Baseline Cr: 3 34-5 60  - Creatinine on admission 5 43   Diagnosis: S/p kidney transplant   o Plan:   - Transplant done in 1998 at [address] with simultaneous pancreas transplant    - Now with allograft failure on dialysis since May 2020   - Not a current transplant candidate due to MM with need to have less than 5% BM activity before consideration   - Remains on prednisone 5mg and tacrolimbus, which is being weaned as outpatient immunosuppression   - S/p IR procedure 9/2018 and transplanted kidney artery open       F/E/N:    Plan:   o Fluids: S/p 1 L isolyte bolus, bicarb x 2   o Electrolytes: Will replete as necessary to maintain potassium > 4 0, magnesium > 2 0, and phosphrous > 3 0    o Nutrition: NPO        Heme/Onc:    Diagnosis:  IgG Kappa multiple myeloma stage III  o Plan:   - Diagnosed 4/2019 which is a transition from smoldering multiple myeloma diagnosed 9/2017   - Outpatient regimen includes daratumumab every Wednesday   - Last dose 3/5    Diagnosis: Chronic anemia   o Plan:   - S/p 1 UPRBC prior to dialysis       Endo:    Diagnosis: Type 1 Diabetes Mellitus   o Plan:   - Start insulin gtt  - Frequent glucose checks   - Hemoglobin A1C 1/22: 4 5    Diagnosis: Hypothyroid   o Plan:   - Continue outpatient levothyroxine   - TSH 3/4: 2 93      ID:    Diagnosis: Possible aspiration pneumonia   o Plan:   - Continue broad spectrum antibiotics cefepime/vanc/flagyl Day 1  - Covid negative 3/5  - Follow up blood cultures       MSK/Skin:    Diagnosis: Diabetic neuropathy grade 3  o Plan:   - Uses a cane at baseline for ambulation   - S/p L transmetatarsal amputation and R hallex amputation     Diagnosis: AVF placement   o Plan:   - S/p placement 7/2020       Disposition: Admit to Critical Care   Code Status: Level 2 - DNAR: but accepts endotracheal intubation  --------------------------------------------------------------------------------------------------------------  Review of Systems   Unable to perform ROS: Intubated   Endocrine: Negative for heat intolerance  A 12-point, complete review of systems was reviewed and negative except as stated above     Physical Exam  Constitutional:       Appearance: She is obese  She is ill-appearing and toxic-appearing  She is not diaphoretic  HENT:      Head: Normocephalic and atraumatic  Nose: Nose normal       Mouth/Throat:      Mouth: Mucous membranes are dry  Comments: Dried red blood present in and around mouth  Eyes:      General: No scleral icterus  Conjunctiva/sclera: Conjunctivae normal       Comments: L> R pupil; non-reactive   Neck:      Musculoskeletal: Normal range of motion and neck supple  Cardiovascular:      Rate and Rhythm: Normal rate and regular rhythm  Heart sounds: Normal heart sounds  Pulmonary:      Effort: Pulmonary effort is normal  No respiratory distress  Comments: Intubated  Abdominal:      General: Bowel sounds are normal  There is no distension  Palpations: Abdomen is soft  Tenderness: There is no abdominal tenderness  Comments: Previous surgical incision clean/dry, well-healed   Genitourinary:     Comments: Rectal tube  Musculoskeletal:      Right lower leg: No edema  Left lower leg: No edema  Comments: L transmetatarsal amputation; R hallex amputation    Skin:     General: Skin is warm and dry  Capillary Refill: Capillary refill takes less than 2 seconds  Coloration: Skin is not jaundiced  Neurological:      GCS: GCS eye subscore is 1  GCS verbal subscore is 1   GCS motor subscore is 1        --------------------------------------------------------------------------------------------------------------  Vitals:   Vitals:    03/05/21 1849 03/05/21 1851 03/05/21 1930 03/05/21 1940   BP:  117/58 (!) 79/41 100/51   BP Location:    Right arm   Pulse:  80 74    Resp:  16     Temp: (!) 96 1 °F (35 6 °C)  (!) 96 8 °F (36 °C)    TempSrc: Probe      SpO2:  100% 100%      Temp  Min: 96 1 °F (35 6 °C)  Max: 96 8 °F (36 °C)        There is no height or weight on file to calculate BMI    N/A    Laboratory and Diagnostics:  Results from last 7 days   Lab Units 03/04/21  0714   WBC Thousand/uL 9 37   HEMOGLOBIN g/dL 7 2*   HEMATOCRIT % 24 2*   PLATELETS Thousands/uL 219   NEUTROS PCT % 66   MONOS PCT % 6     Results from last 7 days   Lab Units 03/05/21  1820 03/04/21  0714   SODIUM mmol/L 140 139   POTASSIUM mmol/L 5 3 4 7   CHLORIDE mmol/L 102 101   CO2 mmol/L 17* 29   ANION GAP mmol/L 21* 9   BUN mg/dL 31* 35*   CREATININE mg/dL 5 43* 6 47*   CALCIUM mg/dL 8 5 9 0   GLUCOSE RANDOM mg/dL 473*  --    ALT U/L 64 14   AST U/L 133* 17   ALK PHOS U/L 250* 168*   ALBUMIN g/dL 2 3* 2 9*   TOTAL BILIRUBIN mg/dL 0 50 0 65     Results from last 7 days   Lab Units 03/05/21  1820   MAGNESIUM mg/dL 2 5      Results from last 7 days   Lab Units 03/05/21  1820   INR  1 43*   PTT seconds 36      Results from last 7 days   Lab Units 03/05/21  1820   TROPONIN I ng/mL 0 49*     Results from last 7 days   Lab Units 03/05/21  1820   LACTIC ACID mmol/L 13 5*     ABG:  Results from last 7 days   Lab Units 03/05/21  1837   PH ART  7 109*   PCO2 ART mm Hg 46 3*   PO2 ART mm Hg 179 2*   HCO3 ART mmol/L 14 3*   BASE EXC ART mmol/L -14 2   ABG SOURCE  Radial, Left     VBG:  Results from last 7 days   Lab Units 03/05/21  1837   ABG SOURCE  Radial, Left           Micro:        EKG: Reviewed       Imaging: I have personally reviewed pertinent films in PACS    Historical Information   Past Medical History:   Diagnosis Date    Abnormal liver function test     Acute kidney [facility] ([facility] [address] [facility]     Acute on chronic congestive heart failure (HCC)     Allergic urticaria     Anemia     Cancer (HCC)     Multiple myeloma    Cervical dysplasia     Cholelithiasis     Chronic diastolic (congestive) heart failure ([facility] [facility] ) 9/18/2017    Diabetes mellitus (HCC)     Previous, controlled with diet    Diabetes mellitus with foot ulcer ([facility] [facility] )     Disease of thyroid gland     Encephalopathy     Hematuria     + leak est- secondary to UTIs/panc drainage    History of transfusion     Hyperkalemia     Hypertension     Iliotibial band syndrome     Lumbar radiculopathy     Multiple myeloma (HCC)     Multiple myeloma (HCC)     Night blindness     Nonrheumatic aortic (valve) insufficiency     Pneumonia     Renal disorder     Retinopathy     Seborrhea     Seizure ([facility] [address] [facility]     Shingles     Sinus tachycardia     B blocker - cardio echo stress test 02 normal/neg LE doppler 2/02 OK and 12/07    Status post simultaneous kidney and pancreas transplant ([facility] [address] [facility] )     Toe amputation status     Trochanteric bursitis      Past Surgical History:   Procedure Laterality Date    CATARACT EXTRACTION      CHOLECYSTECTOMY      COLONOSCOPY      two polyps in the rectum removed and biopsied diverticulosis in the sigmoid colon, external hemorrhoiods- Dr Barfield    COMBINED KIDNEY-PANCREAS TRANSPLANT N/A     CT BONE MARROW BIOPSY AND ASPIRATION  4/17/2019    CYSTOSCOPY N/A 10/13/2016    Procedure: CYSTOSCOPY, retrograde pyelogram, biopsy of ureteral polyp; Surgeon: Lars Page MD;  [facility]: [facility];  Service:    [facility] DILATION AND CURETTAGE OF UTERUS      ESOPHAGOGASTRODUODENOSCOPY N/A 11/20/2017    Procedure: ESOPHAGOGASTRODUODENOSCOPY (EGD); Surgeon: Lauren Silva DO;  Location: [facility]; Service: Gastroenterology    EYE SURGERY      cataracts    FOOT AMPUTATION THROUGH METATARSAL Left     FOOT SURGERY Right     excision of metatarsal heads    HALLUX VALGUS CORRECTION Right     IR TUNNELED CENTRAL LINE REMOVAL  11/13/2020    IR TUNNELED DIALYSIS CATHETER PLACEMENT  5/5/2020    NEPHRECTOMY TRANSPLANTED ORGAN      PANCREATIC TRANSPLANT REMOVAL  1998    NH ANASTOMOSIS,AV,ANY SITE Right 7/13/2020    Procedure: Creation of right brachiocephalic fistula;   Surgeon: Meryle Molt Doctor, MD;  Location[facility];  Service: Vascular     Social History   Social History     Substance and Sexual Activity   Alcohol Use Never    Frequency: Never    Binge frequency: Never     Social History     Substance and Sexual Activity   Drug Use Never    Types: Hydrocodone    Comment: Past cocaine use many years ago - no current use     Social History     Tobacco Use   Smoking Status Former Smoker    Quit date: 2012    Years since quittin 8   Smokeless Tobacco Never Used     Exercise History: N/a  Family History:   Family History   Problem Relation Age of Onset    Hypertension Mother     Cancer Mother     Hypertension Father     Cancer Father     Cancer Maternal Grandfather     Cancer Paternal Grandmother     Cancer Paternal Grandfather     Depression Sister     Breast cancer Maternal Grandmother 77    Mental illness Paternal Uncle     Completed Suicide  Paternal Uncle     Drug abuse Other      I have reviewed this patient's family history and commented on sigificant items within the HPI      Medications:  Current Facility-Administered Medications   Medication Dose Route Frequency    acetaminophen (TYLENOL) tablet 975 mg  975 mg Oral Q6H    busPIRone (BUSPAR) tablet 15 mg  15 mg Oral Q8H    chlorhexidine (PERIDEX) 0 12 % oral rinse 15 mL  15 mL Swish & Spit Q12H [address]    heparin (porcine) subcutaneous injection 5,000 Units  5,000 Units Subcutaneous Q8H [address]    multi-electrolyte (ISOLYTE-S PH 7 4) bolus 1,000 mL  1,000 mL Intravenous Once    norepinephrine (LEVOPHED) 4 mg (STANDARD CONCENTRATION) IV in sodium chloride 0 9% 250 mL  1-30 mcg/min Intravenous Titrated    pantoprazole (PROTONIX) injection 40 mg  40 mg Intravenous Q12H [address]    sodium bicarbonate 8 4 % injection 50 mEq  50 mEq Intravenous Once    vancomycin (VANCOCIN) 1,500 mg in sodium chloride 0 9 % 250 mL IVPB  20 mg/kg (Adjusted) Intravenous Once     Facility-Administered Medications Ordered in Other Encounters   Medication Dose Route Frequency    [MAR Hold] LORazepam (ATIVAN) injection 0 5 mg  0 5 mg Intravenous Q6H PRN Home medications:  Prior to Admission Medications   Prescriptions Last Dose Informant Patient Reported? Taking?    ARIPiprazole (ABILIFY) 30 mg tablet   No No   Sig: Take 1 tablet (30 mg total) by mouth daily at bedtime   CRANBERRY PO  Self Yes No   Sig: Take by mouth   Cholecalciferol (VITAMIN D3) 1000 units CAPS  Self Yes No   Sig: Take 1 capsule by mouth daily    DULoxetine (CYMBALTA) 60 mg delayed release capsule   No No   Sig: Take 1 capsule (60 mg total) by mouth 2 (two) times a day   LORazepam (ATIVAN) 2 mg tablet   No No   Sig: Take 1 tablet (2 mg total) by mouth 3 (three) times a day as needed for anxiety To be filled on or after 3/27/21   aspirin 81 MG tablet  Self Yes No   Sig: Take 1 tablet by mouth daily   busPIRone (BUSPAR) 15 mg tablet   No No   Sig: Take 1 tablet (15 mg total) by mouth 2 (two) times a day   calcium acetate (PHOSLO) capsule  Self Yes No   Sig: Take 667 mg by mouth 3 (three) times a day with meals   diphenoxylate-atropine (LOMOTIL) 2 5-0 025 mg per tablet  Self No No   Sig: Take 1 tablet by mouth 4 (four) times a day as needed for diarrhea   folic acid (FOLVITE) 1 mg tablet  Self No No   Sig: TAKE 1 TABLET BY MOUTH EVERY DAY AS DIRECTED   glucose blood test strip  Self No No   Sig: Test 4 times daily   insulin glargine (Toujeo Max SoloStar) 300 units/mL CONCETRATED U-300 injection pen (2-unit dial)   No No   Sig: Inject 6 Units under the skin daily at bedtime   insulin lispro (HumaLOG KwikPen) 100 units/mL injection pen  Self No No   Sig: Use up to 20 units daily via sliding scale   levothyroxine 125 mcg tablet  Self No No   Sig: TAKE 1 TABLET BY MOUTH EVERY DAY   loperamide (IMODIUM A-D) 2 MG tablet  Self No No   Sig: Take 1 tablet (2 mg total) by mouth 4 (four) times a day as needed for diarrhea   metoprolol tartrate (LOPRESSOR) 50 mg tablet  Self No No   Sig: TAKE 1 TABLET TWICE DAILY   pantoprazole (PROTONIX) 40 mg tablet  Self No No   Sig: Take 1 tablet (40 mg total) by mouth 2 (two) times a day before meals   pravastatin (PRAVACHOL) 80 mg tablet  Self No No   Sig: TAKE 1 TABLET BY MOUTH EVERY DAY   predniSONE 5 mg tablet  Self No No   Sig: One tablet daily   rOPINIRole (REQUIP) 0 25 mg tablet  Self No No   Sig: Take 1 tablet (0 25 mg total) by mouth 2 (two) times a day   sertraline (ZOLOFT) 100 mg tablet   No No   Sig: Take 2 tablets (200 mg total) by mouth daily   tacrolimus (PROGRAF) 0 5 mg capsule  Self No No   Sig: Take 2 capsules (1 mg total) by mouth 2 (two) times a day   torsemide (DEMADEX) 20 mg tablet   No No   Sig: Take 1 tablet (20 mg total) by mouth daily      Facility-Administered Medications: None     Allergies: Allergies   Allergen Reactions    Penicillins Hives and Shortness Of Breath     However, has tolerated Cephalexin and Zosyn which are similar side chain  Has also tolerated Cephs with different side chains such as: Ceftriaxone, Cefepime, Cefdinir, Cefpodoxime    Ixazomib Rash     Ninlaro    Revlimid [Lenalidomide] Rash    Cefadroxil Other (See Comments)     Unlikely allergy, as patient has tolerated Cephalexin which looks almost the same  Has also tolerated other Cephs with different side chains such as: Ceftriaxone, Cefepime, Cefdinir, Cefpodoxime   Morphine Other (See Comments)     Other reaction(s):  Other (See Comments)  projectile vomiting    Morphine And Related GI Intolerance    Myrbetriq [Mirabegron] Hives     ------------------------------------------------------------------------------------------------------------  Advance Directive and Living Will:      Power of :    POLST:    ------------------------------------------------------------------------------------------------------------  Anticipated Length of Stay is > 2 midnights    Care Time Delivered:   Upon my evaluation, this patient had a high probability of imminent or life-threatening deterioration due to cardiac arrest, which required my direct attention, intervention, and personal management  I have personally provided 45 minutes (2015 to 2100) of critical care time, exclusive of procedures, teaching, family meetings, and any prior time recorded by providers other than myself         Kristofer Mejias PA-C

## 2021-03-07 LAB — MRSA NOSE QL CULT: NORMAL

## 2021-03-08 ENCOUNTER — ANCILLARY ORDERS (OUTPATIENT)
Dept: GASTROENTEROLOGY | Facility: CLINIC | Age: 56
End: 2021-03-08

## 2021-03-08 DIAGNOSIS — D64.9 ANEMIA, UNSPECIFIED TYPE: ICD-10-CM

## 2021-03-08 LAB
ABO GROUP BLD BPU: NORMAL
ATRIAL RATE: 241 BPM
ATRIAL RATE: 80 BPM
BPU ID: NORMAL
CROSSMATCH: NORMAL
P AXIS: 260 DEGREES
P AXIS: 47 DEGREES
PR INTERVAL: 156 MS
QRS AXIS: -46 DEGREES
QRS AXIS: -67 DEGREES
QRSD INTERVAL: 102 MS
QRSD INTERVAL: 150 MS
QT INTERVAL: 368 MS
QT INTERVAL: 454 MS
QTC INTERVAL: 523 MS
QTC INTERVAL: 541 MS
T WAVE AXIS: 87 DEGREES
T WAVE AXIS: 99 DEGREES
UNIT DISPENSE STATUS: NORMAL
UNIT PRODUCT CODE: NORMAL
UNIT RH: NORMAL
VENTRICULAR RATE: 130 BPM
VENTRICULAR RATE: 80 BPM

## 2021-03-10 ENCOUNTER — TELEPHONE (OUTPATIENT)
Dept: HEMATOLOGY ONCOLOGY | Facility: CLINIC | Age: 56
End: 2021-03-10

## 2021-03-10 ENCOUNTER — TELEPHONE (OUTPATIENT)
Dept: FAMILY MEDICINE CLINIC | Facility: CLINIC | Age: 56
End: 2021-03-10

## 2021-03-10 LAB
BACTERIA BLD CULT: NORMAL
BACTERIA BLD CULT: NORMAL

## 2021-03-10 NOTE — TELEPHONE ENCOUNTER
Marlys Haider from [facility]  Marlys Haider needs to go over history with Dr Raymond Mc needs to speak directly to PCP

## 2021-03-10 NOTE — TELEPHONE ENCOUNTER
Sharman Gottron from [facility]  Sharman Gottron needs to go over history with MD Sharman Gottron needs to speak directly to MD  Will pass on to Dr Kamilla Lal MA

## 2022-05-02 NOTE — ASSESSMENT & PLAN NOTE
· With history of recurrent UTIs in setting of renal and pancreatic transplant    Most recently with VRE at recent admission to [facility]  · Presenting now with abdominal pain/nausea and lethargy/confusion; does deny maya dysuria but seems confused on examination  · UA with innumerable WBCs, occasional bacteria  · Possibly from UTI versus colitis from diarrhea as per CT abdomen/pelvis  · To get dose of cefepime, will ask for ID recommendations for need for further antibiotic therapy  · Trend WBC, temperature curve  · Follow up results of blood cultures x2, urine culture [Negative] : Heme/Lymph

## 2022-06-20 NOTE — ASSESSMENT & PLAN NOTE
· On chronic immunosuppression with tacrolimus and prednisone  · Unfortunately patient noncompliant with all medications since discharge including these  · Tacrolimus level is pending and will appreciate further Nephrology recommendations 68

## 2022-08-16 NOTE — PHYSICAL THERAPY NOTE
Physical Therapy Cancellation Note    The pt  Is off of the floor at hemodialysis  Will continue to follow      Cary Boucher PTA ,[email or web address]

## 2022-08-23 NOTE — ASSESSMENT & PLAN NOTE
· Follows with Dr Rachel Humphries as outpatient  · Continue Revlimid at home dosage  · Monitor hb, running low Airborne+Contact precautions

## 2023-03-13 NOTE — ASSESSMENT & PLAN NOTE
· Creatinine worsened to 2 7 today - monitor PVR - neg, tac levels, cont IVF per nephro  · ?  ATN  · Monitor volume status, not all UO documented  · Monitor BMP while inpatient  · Avoid nephrotoxins as able, renally dose medications as indicated What Type Of Note Output Would You Prefer (Optional)?: Standard Output Hpi Title: Evaluation of Skin Lesions How Severe Are Your Spot(S)?: mild Have Your Spot(S) Been Treated In The Past?: has not been treated

## 2023-03-30 NOTE — ASSESSMENT & PLAN NOTE
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

## 2023-05-03 NOTE — TELEPHONE ENCOUNTER
----- Message from Brendan Spurling, MD sent at 2/24/2021  3:04 PM EST -----  Please let the patient know received her thyroid studies  Have forwarded to her primary care physician already  Please ask the patient to call her primary care physician if she does not hear from them in the coming few days to follow-up the plan      Thank you    np Rinvoq Pregnancy And Lactation Text: Based on animal studies, Rinvoq may cause embryo-fetal harm when administered to pregnant women.  The medication should not be used in pregnancy.  Breastfeeding is not recommended during treatment and for 6 days after the last dose.

## 2024-01-31 NOTE — PLAN OF CARE
Colton Melchor  8/7/1991    Chief Complaint   Patient presents with    Physical     TA RM1       HPI:   Colton Melchor is a 32 year old male who presents for an annual physical examination.    Since last evaluation the patient has overall maintained his usual state of health.  He did experience some change prompting an increase in lithium dose that did offer overall improvement.  He cannot recall what those symptoms were, however he reports that currently he is doing well, both physically and mentally.  Current lithium dose is 0.6.  He follows regularly with the psychiatry service.  AST is 53 and ALT is not reported.  Triglyceride level is 722.  On further discussion there was concern regarding duration of fasting prior to this laboratory evaluation.  No acute concerns at this time.    Current Outpatient Medications   Medication Sig Dispense Refill    eszopiclone (LUNESTA) 2 MG Oral Tab Take 1 tablet (2 mg total) by mouth nightly as needed (sleep). 30 tablet 0    lamoTRIgine 100 MG Oral Tab Take 1 tablet (100 mg total) by mouth daily. Take with a 200 mg tablet. 90 tablet 0    lamoTRIgine 200 MG Oral Tab Take 1 tablet by mouth nightly. Take with a 100 mg tablet. 90 tablet 0    lithium  MG Oral Tab CR Take 1 tablet (300 mg total) by mouth nightly. Take with a 450 mg tablet. 90 tablet 0    lithium  MG Oral Tab CR TAKE ONE TABLET BY MOUTH NIGHTLY. TAKE WITH A 300MG TABLET 90 tablet 0    Propranolol HCl ER 80 MG Oral Capsule SR 24 Hr Take 1 capsule (80 mg total) by mouth nightly. 90 capsule 0    Multiple Vitamin (MULTI-VITAMIN) Oral Tab Take 1 tablet by mouth daily.        No Known Allergies   Past Medical History:   Diagnosis Date    Concussion 03/2022    No LOC    COVID-19 05/2022    Suicide attempt by hanging (formerly [facility]) 2010    Was cut down by his friends    Suicide attempt by substance overdose (formerly [facility]) 11/2012    Overdosed on unknown OTC sleeping pills, ended up in ICU      Patient Active Problem List  Problem: Potential for Falls  Goal: Patient will remain free of falls  Description  INTERVENTIONS:  - Assess patient frequently for physical needs  -  Identify cognitive and physical deficits and behaviors that affect risk of falls  -  [place] fall precautions as indicated by assessment   - Educate patient/family on patient safety including physical limitations  - Instruct patient to call for assistance with activity based on assessment  - Modify environment to reduce risk of injury  - Consider OT/PT consult to assist with strengthening/mobility  Outcome: Progressing     Problem: Nutrition/Hydration-ADULT  Goal: Nutrient/Hydration intake appropriate for improving, restoring or maintaining nutritional needs  Description  Monitor and assess patient's nutrition/hydration status for malnutrition  Collaborate with interdisciplinary team and initiate plan and interventions as ordered  Monitor patient's weight and dietary intake as ordered or per policy  Utilize nutrition screening tool and intervene as necessary  Determine patient's food preferences and provide high-protein, high-caloric foods as appropriate       INTERVENTIONS:  - Monitor oral intake, urinary output, labs, and treatment plans  - Assess nutrition and hydration status and recommend course of action  - Evaluate amount of meals eaten  - Assist patient with eating if necessary   - Allow adequate time for meals  - Recommend/ encourage appropriate diets, oral nutritional supplements, and vitamin/mineral supplements  - Order, calculate, and assess calorie counts as needed  - Recommend, monitor, and adjust tube feedings and TPN/PPN based on assessed needs  - Assess need for intravenous fluids  - Provide specific nutrition/hydration education as appropriate  - Include patient/family/caregiver in decisions related to nutrition  Outcome: Progressing     Problem: PAIN - ADULT  Goal: Verbalizes/displays adequate comfort level or baseline comfort   Diagnosis    Fatty liver    Bipolar disorder (HCC)    Lithium-induced tremor      Past Surgical History:   Procedure Laterality Date    LASIK      OTHER SURGICAL HISTORY      [place] tooth extraction      Family History   Problem Relation Age of Onset    Hypertension Mother     Cancer Mother         Thyroid    Psychiatric Father         \"Unstable mood;\" never diagnosed    Depression Father         Possible depression; irritability, anger    Asthma Sister     Migraines Sister     Depression Brother         After divorce (now doing well)    Diabetes Maternal Grandmother         Type 1    Alcohol and Other Disorders Associated Maternal Grandfather     PTSD Maternal Grandfather         [place] war medic    No Known Problems Paternal Grandmother     No Known Problems Paternal Grandfather     Anxiety Neg     Bipolar Disorder Neg     Schizophrenia Neg     Suicide History Neg     Heart Attack Neg     Stroke Neg     Seizure Disorder Neg       Social History     Socioeconomic History    Marital status: Single   Tobacco Use    Smoking status: Former     Types: Cigarettes     Quit date: 2021     Years since quittin.3    Smokeless tobacco: Never   Vaping Use    Vaping Use: Never used   Substance and Sexual Activity    Alcohol use: Yes     Alcohol/week: 0.0 standard drinks of alcohol     Comment: social    Drug use: Yes     Types: Cannabis         REVIEW OF SYSTEMS:   GENERAL: feels well otherwise  SKIN: no rashes  EYES:denies blurred vision or double vision  HEENT: Not congested  LUNGS: denies shortness of breath with exertion  CARDIOVASCULAR: denies chest pain on exertion  GI: no nausea or abdominal pain  NEURO: denies headaches    EXAM:   /78   Pulse 78   Temp 97.7 °F (36.5 °C) (Temporal)   Resp 16   Ht 5' 9\" (1.753 m)   Wt 191 lb 3.2 oz (86.7 kg)   SpO2 99%   BMI 28.24 kg/m²   GENERAL: Well developed, well nourished,in no apparent distress  SKIN: No rashes,no suspicious lesions  EYES: Bilateral  level  Description  Interventions:  - Encourage patient to monitor pain and request assistance  - Assess pain using appropriate pain scale  - Administer analgesics based on type and severity of pain and evaluate response  - Implement non-pharmacological measures as appropriate and evaluate response  - Consider cultural and social influences on pain and pain management  - Notify physician/advanced practitioner if interventions unsuccessful or patient reports new pain  Outcome: Progressing     Problem: INFECTION - ADULT  Goal: Absence or prevention of progression during hospitalization  Description  INTERVENTIONS:  - Assess and monitor for signs and symptoms of infection  - Monitor lab/diagnostic results  - Monitor all insertion sites, i e  indwelling lines, tubes, and drains  - Monitor endotracheal if appropriate and nasal secretions for changes in amount and color  - [place] appropriate cooling/warming therapies per order  - Administer medications as ordered  - Instruct and encourage patient and family to use good hand hygiene technique  - Identify and instruct in appropriate isolation precautions for identified infection/condition  Outcome: Progressing  Goal: Absence of fever/infection during neutropenic period  Description  INTERVENTIONS:  - Monitor WBC    Outcome: Progressing     Problem: SAFETY ADULT  Goal: Patient will remain free of falls  Description  INTERVENTIONS:  - Assess patient frequently for physical needs  -  Identify cognitive and physical deficits and behaviors that affect risk of falls    -  [place] fall precautions as indicated by assessment   - Educate patient/family on patient safety including physical limitations  - Instruct patient to call for assistance with activity based on assessment  - Modify environment to reduce risk of injury  - Consider OT/PT consult to assist with strengthening/mobility  Outcome: Progressing  Goal: Maintain or return to baseline ADL conjunctiva are clear  HEENT: atraumatic, normocephalic.  Tympanic membrane within normal limits bilaterally.  NECK: supple,no adenopathy,no bruits  LUNGS: clear to auscultation  CARDIO: RRR without murmur  GI: good BS's,no masses, HSM or tenderness    ASSESSMENT AND PLAN:   Colton Melchor is a 32 year old male who presents for an annual physical examination.    Outstanding screening and preventive measures:  Tetanus immunization: Tdap administered today    Hypertriglyceridemia:  May not have been an adequate sample.  History of hypertriglyceridemia with triglyceride level of 176 in December 2022.  Dietary management reinforced  Repeat study in 3 months to determine next steps in management.  May need fibrate therapy.    Elevated AST:   Isolated, with normal liver function  History of fatty liver disease  Current hypertriglyceridemia  Repeat study in 3 months    Bipolar disorder:  Stable and controlled  Continue current management per psychiatry service    The patient indicates understanding of these issues and agrees to the plan.  TODAY'S ORDERS     Orders Placed This Encounter   Procedures    Comp Metabolic Panel (14)    Lipid Panel    TdaP (Adacel, Boostrix) [94137]       Meds & Refills:  Requested Prescriptions      No prescriptions requested or ordered in this encounter       Imaging & Consults:  TETANUS, DIPHTHERIA TOXOIDS AND ACELLULAR PERTUSIS VACCINE (TDAP), >7 YEARS, IM USE    No follow-ups on file.  There are no Patient Instructions on file for this visit.    All questions were answered and the patient agrees with the plan.     Thank you,  Catalino Krause MD   function  Description  INTERVENTIONS:  -  Assess patient's ability to carry out ADLs; assess patient's baseline for ADL function and identify physical deficits which impact ability to perform ADLs (bathing, care of mouth/teeth, toileting, grooming, dressing, etc )  - Assess/evaluate cause of self-care deficits   - Assess range of motion  - Assess patient's mobility; develop plan if impaired  - Assess patient's need for assistive devices and provide as appropriate  - Encourage maximum independence but intervene and supervise when necessary  - Involve family in performance of ADLs  - Assess for home care needs following discharge   - Consider OT consult to assist with ADL evaluation and planning for discharge  - Provide patient education as appropriate  Outcome: Progressing  Goal: Maintain or return mobility status to optimal level  Description  INTERVENTIONS:  - Assess patient's baseline mobility status (ambulation, transfers, stairs, etc )    - Identify cognitive and physical deficits and behaviors that affect mobility  - Identify mobility aids required to assist with transfers and/or ambulation (gait belt, sit-to-stand, lift, walker, cane, etc )  - [place] fall precautions as indicated by assessment  - Record patient progress and toleration of activity level on Mobility SBAR; progress patient to next Phase/Stage  - Instruct patient to call for assistance with activity based on assessment  - Consider rehabilitation consult to assist with strengthening/weightbearing, etc   Outcome: Progressing     Problem: DISCHARGE PLANNING  Goal: Discharge to home or other facility with appropriate resources  Description  INTERVENTIONS:  - Identify barriers to discharge w/patient and caregiver  - Arrange for needed discharge resources and transportation as appropriate  - Identify discharge learning needs (meds, wound care, etc )  - Arrange for interpretive services to assist at discharge as needed  - Refer to Case Management Department for coordinating discharge planning if the patient needs post-hospital services based on physician/advanced practitioner order or complex needs related to functional status, cognitive ability, or social support system  Outcome: Progressing     Problem: Knowledge Deficit  Goal: Patient/family/caregiver demonstrates understanding of disease process, treatment plan, medications, and discharge instructions  Description  Complete learning assessment and assess knowledge base    Interventions:  - Provide teaching at level of understanding  - Provide teaching via preferred learning methods  Outcome: Progressing     Problem: COPING  Goal: Pt/Family able to verbalize concerns and demonstrate effective coping strategies  Description  INTERVENTIONS:  - Assist patient/family to identify coping skills, available support systems and cultural and spiritual values  - Provide emotional support, including active listening and acknowledgement of concerns of patient and caregivers  - Reduce environmental stimuli, as able  - Provide patient education  - Assess for spiritual pain/suffering and initiate spiritual care, including notification of Pastoral Care or [place] based community as needed  - Assess effectiveness of coping strategies  Outcome: Progressing

## 2024-02-06 NOTE — TELEPHONE ENCOUNTER
Log dated 10/18/2018 scanned and attached to this encounter  Current diabetic medications patient is taking: Toujeo 4 units @ HS  Humalog - sliding scale      Please review and recommend 
Please advise to decrease Toujeo to 3 units at bedtime  Please order C-peptide for her to complete prior to her next visit       Nya Lutz PA-C
Spoke to pt who understood to decrease toujeo  Med list updated  Lab ordered 
yes

## 2024-07-01 NOTE — PROGRESS NOTES
Patient's FSGs are uncontrolled due to hypoglycemia on current medication regimen.  Last A1c reviewed-   Lab Results   Component Value Date    HGBA1C 6.7 (H) 04/09/2024     Patient has been treating with basal-bolus insulin, prescribed Lantus 80 units daily. Recent admission at this facility reviewed and noted she was well controlled on 40 units daily plus sliding scale. Suspect decreased intake due to chemo and cholecystitis. Have advised to decrease Lantus dose until follow up with PCP, monitor glucose closely.   Progress Note - Richelle Shirley 1965, 53 y o  female MRN: [id number]    [facility] Encounter: [id number]    Primary Care Provider: Malgorzata Gale MD   Date and time admitted to hospital: 8/14/2019  2:40 PM        * Acute renal failure superimposed on stage 3 chronic kidney disease ([facility] [address] )  Assessment & Plan  Worsening creatinine with Hematuria and pyuria  Lasix dose was increased 1 week ago from 20 mg to 60 mg daily  Started on  new chemotherapy Lenalidomide  6 days ago  Decreased oral intake noted by patient  Hold Lasix  Start IV fluid for hydration  Hold lenalidomide for now  Consult Nephrology for further management  Follow on renal ultrasound    8/15-ultrasound completed on transplant kidney, concerning for ATN versus possible rejection  Prograf levels are within normal limits, however this medication is a common cause of ATN  Bk virus labs and progress  EDUARDO improved overnight, serum creatinine now 2 36 down from 2 63  BUN creatinine ratio consistent with prerenal etiology  Will continue gentle IV hydration at this time and monitor  Nephrology consulted and appreciate their recommendations  8/16-patient continued on IV fluids overnight, have transitioned to Cipro from Rocephin for treatment of E coli UTI  Nephrology following, recommend continued IVFs, newly elevated creatinine may represent new baseline  8/17-serum creatinine now at baseline levels on morning labs, 2 13  EDUARDO resolved, will discuss with Nephrology this morning, however patient likely stable for discharge  States she has appointment with Nephrology on Monday  Skin rash  Assessment & Plan  Pleuritic Maculopapular rash mostly on bilateral thigh and groin area  Started yesterday  Rule out secondary to drug rash  Hold lenalidomide  Change prednisone to IV Solu-Medrol  Continue supportive care    8/15-patient reports rash improved dramatically overnight, she attributes this to the IV Solu-Medrol and Benadryl  Revlimid stopped and will defer to Primary Oncology as outpatient to find alternative treatments or dose reduction  Discussed with Oncology here who states that rash following Revlimid administration is quite common  Monitoring continue present therapy  Will observe overnight and consider Dermatology consultation in the morning if not showing continued improvement  8/16-patient with continued Revlimid cessation and IV Solu-Medrol overnight, however rash continues to spread  States not as puritic given treatment with Benadryl, but affected areas now larger  Will discuss with Dermatology  8/17-discussed rash with Dermatology who, after looking at the images and epic, believe the rash is consistent with drug reaction  Offending drug stopped, overnight rash improved, less erythematous and she no longer requires Benadryl for pruritus  Major depressive disorder, recurrent episode, in full remission [facility])  Assessment & Plan  Continue home meds  Patient denies acute issues, denies SI or HI    Renal transplant recipient  Assessment & Plan  Patient is status post kidney and pancreatic transplant in 1998  Continue Prograf and steroids  Nephrology consulted for further management, appreciate their recommendations    Abnormal urinalysis  Assessment & Plan  Patient reported urinary frequency over the past couple days associated with decreasing oral intake  No dysuria  No fever or chills  Rule out UTI  Start IV antibiotic empirically  Follow on urine cultures    8/15-patient reports dysuria, empiric antibiotics started yesterday  Will follow urine cultures and adjust antibiotic therapy as needed  Vital signs stable, white count within normal limits      8/16-patient states that symptoms of dysuria have resolved, transition from ceftriaxone to Cipro for completion of UTI treatment    Multiple myeloma not having achieved remission [facility])  Assessment & Plan  Hold lenalidomide for now  Monitor calcium level  Outpatient Hematology follow-up    8/14-discussed patient briefly with Oncology, stated Revlimid is common cause of rash, we will continue to hold medication at this time  Will defer to outpatient oncology for alternative medications or dose reduction  8/17-discussed with patient importance of follow-up with her primary oncologist, patient voiced understanding  Stable for discharge today, however will need resumption of chemotherapy for multiple myeloma outpatient    Essential hypertension  Assessment & Plan  Monitor blood pressure  Continue Lopressor, hydralazine, Cardura, clonidine and amlodipine  8/16-patient's blood pressure continues to be elevated, consider increasing hydralazine  P r n  Hydralazine for systolic blood pressure greater than 180  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 ([facility] [address] )  Assessment & Plan  - pt with last A1C = 5 1    - home meds include   - Insulin - Long-acting, toujeo 1 U qhs       - Short-acting 10 U preprandial    - hold home oral medications while inpatient  - start Low-CSI  - BG check qc/qhs with goal -180        VTE Pharmacologic Prophylaxis:   Pharmacologic: Heparin  Mechanical VTE Prophylaxis in Place: No    Patient Centered Rounds: I have evaluated patient without nursing staff present due to shift change    Discussions with Specialists or Other Care Team Provider: dermatology    Education and Discussions with Family / Patient: Discussed treatment plan with  patient who agree with current plan; encouraged to ask questions and participate  Time Spent for Care: 30 minutes  More than 50% of total time spent on counseling and coordination of care as described above  Current Length of Stay: 3 day(s)    Current Patient Status: Inpatient   Certification Statement: Anticipate discharge today    Discharge Plan: 8/17/19    Code Status: Level 1 - Full Code      Subjective:   Patient seen and examined bedside, states she feels better overnight    Rash improved no longer as erythematous or [place]  EDUARDO resolved and patient has follow-up with Nephrology on   Discussed case with Dermatology yesterday, they believe rashes consistent with drug reaction  Patient will follow up with oncologist for alternate therapy or dose reduction for multiple myeloma treatment  Stable for discharge today  Objective:     Vitals:   Temp (24hrs), Av 3 °F (36 8 °C), Min:97 9 °F (36 6 °C), Max:98 8 °F (37 1 °C)    Temp:  [97 9 °F (36 6 °C)-98 8 °F (37 1 °C)] 98 4 °F (36 9 °C)  HR:  [70-79] 71  Resp:  [20] 20  BP: (169-177)/(63-69) 172/68  SpO2:  [95 %-98 %] 98 %  Body mass index is 34 19 kg/m²  Input and Output Summary (last 24 hours): Intake/Output Summary (Last 24 hours) at 2019 0757  Last data filed at 2019 8049  Gross per 24 hour   Intake 3555 ml   Output 2300 ml   Net 1255 ml       Physical Exam:     Physical Exam  Shakila Sharp MD   Physician   Hospitalist   Progress Notes   Signed   Date of Service:  2019 11:58 AM               Signed             Show:Clear all  [x]Manual[x]Template[x]Copied    Added by:  [x]Yahir Hagan MD    []Zev for details        Progress Note - Jenny Quiles 1965, 53 y o  female MRN: [id number]     [facility] [id number]     Primary Care Provider: Devika Starr MD   Date and time admitted to hospital: 2019  2:40 PM           * Acute renal failure superimposed on stage 3 chronic kidney disease ([facility] [address] )  Assessment & Plan  Worsening creatinine with Hematuria and pyuria  Lasix dose was increased 1 week ago from 20 mg to 60 mg daily  Started on  new chemotherapy Lenalidomide  6 days ago  Decreased oral intake noted by patient  Hold Lasix  Start IV fluid for hydration  Hold lenalidomide for now  Consult Nephrology for further management  Follow on renal ultrasound     8/15-ultrasound completed on transplant kidney, concerning for ATN versus possible rejection    Prograf levels are within normal limits, however this medication is a common cause of ATN  Bk virus labs and progress  EDUARDO improved overnight, serum creatinine now 2 36 down from 2 63  BUN creatinine ratio consistent with prerenal etiology  Will continue gentle IV hydration at this time and monitor  Nephrology consulted and appreciate their recommendations      8/16-patient continued on IV fluids overnight, have transitioned to Cipro from Rocephin for treatment of E coli UTI  Nephrology following, recommend continued IVFs, newly elevated creatinine may represent new baseline       Skin rash  Assessment & Plan  Pleuritic Maculopapular rash mostly on bilateral thigh and groin area  Started yesterday  Rule out secondary to drug rash  Hold lenalidomide  Change prednisone to IV Solu-Medrol  Continue supportive care     8/15-patient reports rash improved dramatically overnight, she attributes this to the IV Solu-Medrol and Benadryl  Revlimid stopped and will defer to Primary Oncology as outpatient to find alternative treatments or dose reduction  Discussed with Oncology here who states that rash following Revlimid administration is quite common  Monitoring continue present therapy  Will observe overnight and consider Dermatology consultation in the morning if not showing continued improvement      8/16-patient with continued Revlimid cessation and IV Solu-Medrol overnight, however rash continues to spread  States not as puritic given treatment with Benadryl, but affected areas now larger    Will discuss with Dermatology      Major depressive disorder, recurrent episode, in full remission [facility])  Assessment & Plan  Continue home meds  Patient denies acute issues, denies SI or HI     Renal transplant recipient  Assessment & Plan  Patient is status post kidney and pancreatic transplant in 1998  Continue Prograf and steroids  Nephrology consulted for further management, appreciate their recommendations     Abnormal urinalysis  Assessment & Plan  Patient reported urinary frequency over the past couple days associated with decreasing oral intake  No dysuria  No fever or chills  Rule out UTI  Start IV antibiotic empirically  Follow on urine cultures     8/15-patient reports dysuria, empiric antibiotics started yesterday  Will follow urine cultures and adjust antibiotic therapy as needed  Vital signs stable, white count within normal limits      8/16-patient states that symptoms of dysuria have resolved, transition from ceftriaxone to Cipro for completion of UTI treatment     Multiple myeloma not having achieved remission [facility])  Assessment & Plan  Hold lenalidomide for now  Monitor calcium level  Outpatient Hematology follow-up     8/14-discussed patient briefly with Oncology, stated Revlimid is common cause of rash, we will continue to hold medication at this time  Will defer to outpatient oncology for alternative medications or dose reduction      Essential hypertension  Assessment & Plan  Monitor blood pressure  Continue Lopressor, hydralazine, Cardura, clonidine and amlodipine  8/16-patient's blood pressure continues to be elevated, consider increasing hydralazine  P r n   Hydralazine for systolic blood pressure greater than 180       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 ([facility] [address] )  Assessment & Plan  - pt with last A1C = 5 1     - home meds include           - Insulin - Long-acting, toujeo 1 U qhs                           - Short-acting 10 U preprandial     - hold home oral medications while inpatient  - start Low-CSI  - BG check qc/qhs with goal -180           VTE Pharmacologic Prophylaxis:   Pharmacologic: Heparin  Mechanical VTE Prophylaxis in Place: No     Patient Centered Rounds: I have performed bedside rounds with nursing staff today      Discussions with Specialists or Other Care Team Provider: dermatology, nephrology     Education and Discussions with Family / Patient: Discussed treatment plan with family and patient who agree with current plan; encouraged to ask questions and participate         Time Spent for Care: 45 minutes  More than 50% of total time spent on counseling and coordination of care as described above      Current Length of Stay: 2 day(s)     Current Patient Status: Inpatient   Certification Statement: The patient will continue to require additional inpatient hospital stay due to Treatment of EDUARDO and rash     Discharge Plan: TBD     Code Status: Level 1 - Full Code        Subjective:   Patient seen and examined bedside, no acute distress or discomfort noted  No acute events overnight  Serum creatinine today is now 2 4; slightly elevated over yesterday  Will continue gentle hydration and observation; this serum creatinine may represent a baseline for patient  Transition to Cipro for completion of UTI treatment, will discuss with Dermatology regarding rash      Objective:      Vitals:   Temp (24hrs), Av 1 °F (36 7 °C), Min:97 9 °F (36 6 °C), Max:98 4 °F (36 9 °C)     Temp:  [97 9 °F (36 6 °C)-98 4 °F (36 9 °C)] 98 1 °F (36 7 °C)  HR:  [69-81] 70  Resp:  [20] 20  BP: (169-170)/(62-64) 169/64  SpO2:  [95 %-98 %] 96 %  Body mass index is 34 19 kg/m²       Input and Output Summary (last 24 hours):         Intake/Output Summary (Last 24 hours) at 2019 1158  Last data filed at 2019 1112      Gross per 24 hour   Intake 3836 66 ml   Output 2775 ml   Net 1061 66 ml         Physical Exam:      Physical Exam  Constitutional: She is oriented to person, place, and time  She appears well-developed and well-nourished  No distress  HENT:   Head: Normocephalic and atraumatic  Nose: Nose normal    Mouth/Throat: Oropharynx is clear and moist  No oropharyngeal exudate  Eyes: Pupils are equal, round, and reactive to light  Conjunctivae and EOM are normal  Right eye exhibits no discharge  Left eye exhibits no discharge  No scleral icterus     Neck: Normal range of motion  No JVD present  No tracheal deviation present  Cardiovascular: Normal rate, regular rhythm and normal heart sounds  Exam reveals no gallop and no friction rub    No murmur heard  Pulmonary/Chest: Effort normal and breath sounds normal  No stridor  No respiratory distress  She has no wheezes  She has no rales  Abdominal: Soft  She exhibits no distension and no mass  There is no tenderness  There is no rebound and no guarding  Musculoskeletal: Normal range of motion  She exhibits deformity (partial left foot amputation)  She exhibits no edema or tenderness  Neurological: She is alert and oriented to person, place, and time  Skin: Skin is warm and dry  Rash (maculopapular rash on bilater anterior thighs and medial arms) noted  She is not diaphoretic  Psychiatric: She has a normal mood and affect  Her behavior is normal  Judgment normal    Additional Data:      Labs:          Results from last 7 days   Lab Units 08/16/19  0441   WBC Thousand/uL 8 61   HEMOGLOBIN g/dL 9 1*   HEMATOCRIT % 29 5*   PLATELETS Thousands/uL 193   NEUTROS PCT % 89*   LYMPHS PCT % 8*   MONOS PCT % 1*   EOS PCT % 1            Results from last 7 days   Lab Units 08/16/19  0441 08/15/19  0530   SODIUM mmol/L 144 141   POTASSIUM mmol/L 3 9 4 1   CHLORIDE mmol/L 113* 111*   CO2 mmol/L 20* 22   BUN mg/dL 48* 52*   CREATININE mg/dL 2 44* 2 36*   ANION GAP mmol/L 11 8   CALCIUM mg/dL 8 1* 8 3   ALBUMIN g/dL  --  3 0*   TOTAL BILIRUBIN mg/dL  --  0 33   ALK PHOS U/L  --  85   ALT U/L  --  17   AST U/L  --  10   GLUCOSE RANDOM mg/dL 155* 148*                      Results from last 7 days   Lab Units 08/16/19  1131 08/16/19  0739 08/15/19  2222 08/15/19  1716 08/15/19  1127 08/15/19  0741 08/14/19  2256 08/14/19  2118   POC GLUCOSE mg/dl 139 120 98 164* 117 130 153* 123                       * I Have Reviewed All Lab Data Listed Above  * Additional Pertinent Lab Tests Reviewed:  All Labs Within Last 24 Hours Reviewed     Imaging:     Imaging Reports Reviewed Today Include:   Imaging Personally Reviewed by Myself Includes:       Recent Cultures (last 7 days):           Results from last 7 days   Lab Units 08/14/19  1549   URINE CULTURE   >100,000 cfu/ml Escherichia coli*         Last 24 Hours Medication List:      Current Facility-Administered Medications:  acetaminophen 650 mg Oral Q6H PRN Rula Israel, DO     amLODIPine 10 mg Oral QAM Rula Israel, DO     ARIPiprazole 30 mg Oral HS Rula Israel, DO     aspirin 81 mg Oral Daily Rula Israel, DO     busPIRone 5 mg Oral BID Rula Irsael, DO     cholecalciferol 1,000 Units Oral Daily Rula Israel, DO     ciprofloxacin 500 mg Oral Q12H [facility] Elmer Ch MD     cloNIDine 0 3 mg Oral [facility] Rula Israel, DO     diphenhydrAMINE 25 mg Oral Q6H PRN Rula Israel, DO     doxazosin 2 mg Oral HS Rula Israel, DO     DULoxetine 60 mg Oral BID Rula Israel, DO     folic acid 3,973 mcg Oral Daily Rula Israel, DO     heparin (porcine) 5,000 Units Subcutaneous [facility] Rula Israel, DO     hydrALAZINE 10 mg Intravenous Q6H PRN Elmer Ch MD     hydrALAZINE 50 mg Oral [facility] Rula Israel, DO     insulin glargine 10 Units Subcutaneous HS Rula Israel, DO     insulin lispro 1-5 Units Subcutaneous TID AC Rula Israel, DO     insulin lispro 1-5 Units Subcutaneous HS Rula Israel, DO     levothyroxine 125 mcg Oral Early Morning Rula Israel, DO     LORazepam 2 mg Oral TID PRN Rula Israel, DO     methylPREDNISolone sodium succinate 40 mg Intravenous Q12H [facility] Rula Israel, DO     metoprolol tartrate 50 mg Oral Q12H [facility] Rula Israel, DO     ondansetron 4 mg Intravenous Q6H PRN Rula Israel, DO     pravastatin 80 mg Oral QPM Rula Israel, DO     rOPINIRole 0 25 mg Oral BID Rula Israel, DO     sertraline 200 mg Oral Daily Rula Israel, DO     sevelamer 800 mg Oral TID With Meals Rula Israel, DO     sodium bicarbonate 1,300 mg Oral TID Rula Israel DO     sodium chloride 100 mL/hr Intravenous Continuous Alvin Martinez DO Last Rate: 100 mL/hr (08/16/19 0757)   tacrolimus 2 mg Oral QPM Alvin Martinez DO     tacrolimus 3 mg Oral Daily Alvin Martinez DO           Today, Patient Was Seen By: Caro Ca MD     ** Please Note: Dictation voice to text software may have been used in the creation of this document  **                                                   Additional Data:     Labs:    Results from last 7 days   Lab Units 08/17/19  0603   WBC Thousand/uL 10 55*   HEMOGLOBIN g/dL 9 4*   HEMATOCRIT % 31 5*   PLATELETS Thousands/uL 200   NEUTROS PCT % 91*   LYMPHS PCT % 6*   MONOS PCT % 1*   EOS PCT % 1     Results from last 7 days   Lab Units 08/17/19  0603  08/15/19  0530   SODIUM mmol/L 144   < > 141   POTASSIUM mmol/L 3 9   < > 4 1   CHLORIDE mmol/L 115*   < > 111*   CO2 mmol/L 20*   < > 22   BUN mg/dL 45*   < > 52*   CREATININE mg/dL 2 13*   < > 2 36*   ANION GAP mmol/L 9   < > 8   CALCIUM mg/dL 8 2*   < > 8 3   ALBUMIN g/dL  --   --  3 0*   TOTAL BILIRUBIN mg/dL  --   --  0 33   ALK PHOS U/L  --   --  85   ALT U/L  --   --  17   AST U/L  --   --  10   GLUCOSE RANDOM mg/dL 141*   < > 148*    < > = values in this interval not displayed  Results from last 7 days   Lab Units 08/16/19  2100 08/16/19  1604 08/16/19  1131 08/16/19  0739 08/15/19  2222 08/15/19  1716 08/15/19  1127 08/15/19  0741 08/14/19  2256 08/14/19  2118   POC GLUCOSE mg/dl 145* 141* 139 120 98 164* 117 130 153* 123                   * I Have Reviewed All Lab Data Listed Above  * Additional Pertinent Lab Tests Reviewed:  All Labs Within Last 24 Hours Reviewed    Imaging:    Imaging Reports Reviewed Today Include: none  Imaging Personally Reviewed by Myself Includes:  none    Recent Cultures (last 7 days):     Results from last 7 days   Lab Units 08/14/19  1549   URINE CULTURE  >100,000 cfu/ml Escherichia coli*       Last 24 Hours Medication List:     Current Facility-Administered Medications:  acetaminophen 650 mg Oral Q6H PRN Rita Baxter, DO    amLODIPine 10 mg Oral QAM Rita Baxter, DO    ARIPiprazole 30 mg Oral HS Rita Baxter, DO    aspirin 81 mg Oral Daily Rita Baxter, DO    busPIRone 5 mg Oral BID Rita Baxter, DO    cholecalciferol 1,000 Units Oral Daily Rita Baxter, DO    ciprofloxacin 500 mg Oral Q12H [address] Pepito Drake MD    cloNIDine 0 3 mg Oral [facility] Rita Baxter, DO    diphenhydrAMINE 25 mg Oral Q6H PRN Rita Baxter, DO    docusate sodium 100 mg Oral BID Kourtney Ortiz, JOHN    doxazosin 2 mg Oral HS Rita Baxter, DO    DULoxetine 60 mg Oral BID Rita Baxter, DO    folic acid 0,776 mcg Oral Daily Rita Baxter, DO    heparin (porcine) 5,000 Units Subcutaneous [facility] Rita Baxter, DO    hydrALAZINE 10 mg Intravenous Q6H PRN Pepito Drake MD    hydrALAZINE 75 mg Oral [facility] Pepito Drake MD    insulin glargine 10 Units Subcutaneous HS Rita Baxter, DO    insulin lispro 1-5 Units Subcutaneous TID AC Rita Baxter, DO    insulin lispro 1-5 Units Subcutaneous HS Rita Baxter, DO    levothyroxine 125 mcg Oral Early Morning Rita Baxter, DO    LORazepam 2 mg Oral TID PRN Rita Baxter, DO    methylPREDNISolone sodium succinate 40 mg Intravenous Q12H [address] Rita Baxter, DO    metoprolol tartrate 50 mg Oral Q12H [address] Rita Baxter, DO    ondansetron 4 mg Intravenous Q6H PRN Rita Baxter, DO    pravastatin 80 mg Oral QPM Rita Baxter, DO    rOPINIRole 0 25 mg Oral BID Rita Baxter, DO    saliva substitute 5 spray Mouth/Throat PRN Kourtney Ortiz PA-C    senna 2 tablet Oral Daily PRN Kourtney Ortiz, PA-CELIA    sertraline 200 mg Oral Daily Rita Baxter, DO    sevelamer 800 mg Oral TID With Meals Rita Baxter, DO    sodium bicarbonate 1,300 mg Oral TID Rita Baxter, DO    sodium chloride 100 mL/hr Intravenous Continuous iRta Baxter DO Last Rate: 100 mL/hr (08/16/19 2340)   tacrolimus 2 mg Oral QPM Rita Baxter DO    tacrolimus 3 mg Oral Daily Rita Baxter DO         Today, Patient Was Seen By: Barb Acosta MD    ** Please Note: Dictation voice to text software may have been used in the creation of this document   **

## 2024-10-25 NOTE — PLAN OF CARE
Problem: Potential for Falls  Goal: Patient will remain free of falls  INTERVENTIONS:  - Assess patient frequently for physical needs  - Identify cognitive and physical deficits and behaviors that affect risk of falls    - [place] fall precautions as indicated by assessment   - Educate patient/family on patient safety including physical limitations  - Instruct patient to call for assistance with activity based on assessment  - Modify environment to reduce risk of injury  - Consider OT/PT consult to assist with strengthening/mobility   Outcome: Progressing na

## 2025-05-12 NOTE — TELEPHONE ENCOUNTER
Patient was in to see Dr Israel Toussaint today and said she did not received a script for a dexa scan  She wants one to be mailed to her   Gonzalo Charan can be reached at [phone]  Any questions good balance

## 2025-07-03 NOTE — ASSESSMENT & PLAN NOTE
· Likely secondary to constipation/fecal stasis  · Stool culture, C diff negative  · Obstruction series (12/27/19): Nonobstructive bowel gas pattern with decreased colonic stool burden  · Started on MiraLax 34 g daily per GI  Now with 5-6 episodes of loose stools per day  · Will discontinue Miralax for now  Once loose stools have resolved, would resume 17 g/day  None Doxepin Counseling:  Patient advised that the medication is sedating and not to drive a car after taking this medication. Patient informed of potential adverse effects including but not limited to dry mouth, urinary retention, and blurry vision.  The patient verbalized understanding of the proper use and possible adverse effects of doxepin.  All of the patient's questions and concerns were addressed.

## (undated) DEVICE — ADHESIVE SKIN HIGH VISCOSITY EXOFIN 1ML

## (undated) DEVICE — GLOVE SRG BIOGEL ECLIPSE 6

## (undated) DEVICE — 1/2 FORCE SURGICAL SPRING CLIP: Brand: STEALTH® SPRING CLIP

## (undated) DEVICE — SURGICEL 4 X 8

## (undated) DEVICE — SUT SILK 2-0 18 IN A185H

## (undated) DEVICE — 1200CC GUARDIAN II: Brand: GUARDIAN

## (undated) DEVICE — SUT SILK 3-0 SH 30 IN K832H

## (undated) DEVICE — PETRI DISH STERILE

## (undated) DEVICE — VESSEL LOOPS X-RAY DETECTABLE: Brand: DEROYAL

## (undated) DEVICE — LIGACLIP MCA MULTIPLE CLIP APPLIERS, 20 SMALL CLIPS: Brand: LIGACLIP

## (undated) DEVICE — 3000CC GUARDIAN II: Brand: GUARDIAN

## (undated) DEVICE — IV CATH 18 G X 1.16 IN

## (undated) DEVICE — PAD GROUNDING ADULT

## (undated) DEVICE — GLOVE INDICATOR PI UNDERGLOVE SZ 6.5 BLUE

## (undated) DEVICE — INTENDED FOR TISSUE SEPARATION, AND OTHER PROCEDURES THAT REQUIRE A SHARP SURGICAL BLADE TO PUNCTURE OR CUT.: Brand: BARD-PARKER ® CARBON RIB-BACK BLADES

## (undated) DEVICE — CHLORAPREP HI-LITE 26ML ORANGE

## (undated) DEVICE — STRL BETHLEHEM A V FISTULA PK: Brand: CARDINAL HEALTH

## (undated) DEVICE — MEDI-VAC YANK SUCT HNDL W/TPRD BULBOUS TIP: Brand: CARDINAL HEALTH

## (undated) DEVICE — SUT SILK 4-0 18 IN A183H

## (undated) DEVICE — INTENDED FOR TISSUE SEPARATION, AND OTHER PROCEDURES THAT REQUIRE A SHARP SURGICAL BLADE TO PUNCTURE OR CUT.: Brand: BARD-PARKER SAFETY BLADES SIZE 15, STERILE

## (undated) DEVICE — SUT MONOCRYL 4-0 PS-2 27 IN Y426H

## (undated) DEVICE — SUT SILK 3-0 18 IN A184H

## (undated) DEVICE — SUT VICRYL 3-0 SH 27 IN J416H

## (undated) DEVICE — SUT VICRYL 2-0 CT-1 27 IN J259H

## (undated) DEVICE — BAG DECANTER

## (undated) DEVICE — ULTRASOUND GEL STERILE FOIL PK